# Patient Record
Sex: MALE | Race: WHITE | NOT HISPANIC OR LATINO | Employment: OTHER | URBAN - METROPOLITAN AREA
[De-identification: names, ages, dates, MRNs, and addresses within clinical notes are randomized per-mention and may not be internally consistent; named-entity substitution may affect disease eponyms.]

---

## 2018-11-07 ENCOUNTER — TRANSFERRED RECORDS (OUTPATIENT)
Dept: HEALTH INFORMATION MANAGEMENT | Facility: CLINIC | Age: 63
End: 2018-11-07

## 2018-11-21 ENCOUNTER — TRANSFERRED RECORDS (OUTPATIENT)
Dept: HEALTH INFORMATION MANAGEMENT | Facility: CLINIC | Age: 63
End: 2018-11-21

## 2019-08-27 LAB
CREAT SERPL-MCNC: 1.11 MG/DL (ref 0.73–1.18)
GFR SERPL CREATININE-BSD FRML MDRD: >60 ML/MIN/1.73M2
GLUCOSE SERPL-MCNC: 102 MG/DL (ref 70–100)
POTASSIUM SERPL-SCNC: 4.6 MMOL/L (ref 3.5–5.1)

## 2019-09-03 ENCOUNTER — TRANSFERRED RECORDS (OUTPATIENT)
Dept: HEALTH INFORMATION MANAGEMENT | Facility: CLINIC | Age: 64
End: 2019-09-03

## 2019-09-05 ENCOUNTER — MEDICAL CORRESPONDENCE (OUTPATIENT)
Dept: HEALTH INFORMATION MANAGEMENT | Facility: CLINIC | Age: 64
End: 2019-09-05

## 2019-09-05 ENCOUNTER — TRANSFERRED RECORDS (OUTPATIENT)
Dept: HEALTH INFORMATION MANAGEMENT | Facility: CLINIC | Age: 64
End: 2019-09-05

## 2019-09-18 ENCOUNTER — REFERRAL (OUTPATIENT)
Dept: TRANSPLANT | Facility: CLINIC | Age: 64
End: 2019-09-18

## 2019-09-18 DIAGNOSIS — Z86.39 HISTORY OF HEMOCHROMATOSIS: ICD-10-CM

## 2019-09-18 DIAGNOSIS — K70.31 ALCOHOLIC CIRRHOSIS OF LIVER WITH ASCITES (H): Primary | ICD-10-CM

## 2019-09-18 DIAGNOSIS — K76.6 PORTAL HYPERTENSION (H): ICD-10-CM

## 2019-09-18 DIAGNOSIS — E11.9 DIABETES MELLITUS TYPE 2, UNCOMPLICATED (H): ICD-10-CM

## 2019-09-18 NOTE — LETTER
10/7/2019    Damion BROWN Quinones   [address] [facility] [address]      Dear Damion,    Thank you for your interest in the Transplant Center at [facility], [facility]. We look forward to being a part of your care team and assisting you through the transplant process.    As we discussed, your transplant coordinator is Abdias Quinteros Jr. (Liver).  You may call your coordinator at any time with questions or concerns call [phone].    Please complete the following.    1. Fill out and return the enclosed forms    Authorization for Electronic Communication    Authorization to Discuss Protected Health Information    Authorization for Release of Protected Health Information    2. Sign up for:    TraceWorks, access to your electronic medical record (see enclosed pamphlet)    [email or web address], a transplant education website    You can use these tools to learn more about your transplant, communicate with your care team, and track your medical details      Sincerely,    Solid Organ Transplant  [facility], [facility]    cc: Vandana BELLO

## 2019-09-18 NOTE — LETTER
LIVER TRANSPLANT  EVALUATION SCHEDULE    Patient:   Damion Quinones  MR#:    [id number]  Coordinator:  Betito       [phone]  :     Magda                 [phone]  Location:    [facility]  Date(s):    October 22, 2019                                  This is your evaluation schedule, please follow dates and times.  You will   receive reminder phone calls for other tests, but please follow this schedule  only!  If you have any questions about dates and times, please call us on  number listed above.  Thank you, Transplant Services     *NO FOOD or DRINK AFTER 10:30 PM*  (You may only have small amounts of water)    Day/Date:    Tuesday, October 22, 2019  Time Location Activity   6:45 AM Imaging and Lab testing  (1st floor [facility] ) Blood tests    7:00 AM Imaging and Lab testing  (1st floor [facility] ) Liver Ultrasound with dopplers=NO FOOD or DRINK 8 HOURS BEFORE TEST!!!   8:30 AM Transplant Services  (3rd floor [facility]) Appointment with Nidhi Leal,  Registered Dietitian   9:30 AM Transplant Services  (3rd floor [facility]) Appointment with Dr. Barrios,  Transplant Surgeon   10:00 AM Transplant Services  (3rd floor [facility]) Appointment with Jamal Bettencourt     11:00 AM Medicine Specialties  (3rd floor [facility]) Appointment with Dr. Leventhal,  Hepatologist         Day/Date:    Every Thursday *OPTIONAL*  Time Location Activity   12:00 PM-1:30 PM Liver Transplant Support Group  Hospital Station 7B  Room 7-120 Every Thursday *OPTIONAL*     *IF ON LINE CHECK IN IS NOT DONE, YOU WILL NEED TO CHECK IN 15 MINUTES EARLIER THEN THE FIRST SCHEDULED APPOINTMENT*

## 2019-09-18 NOTE — LETTER
LIVER TRANSPLANT  EVALUATION SCHEDULE    Patient:   Damion Quinones  MR#:    [id number]  Coordinator:  Betito       [phone]  :     Magda                 [phone]  Location:    [facility]  Date(s):    November 5, 2019                                  This is your evaluation schedule, please follow dates and times.  You will   receive reminder phone calls for other tests, but please follow this schedule  only!  If you have any questions about dates and times, please call us on  number listed above.  Thank you, Transplant Services     *NO FOOD or DRINK AFTER 10:30 PM*  (You may only have small amounts of water)    Day/Date:    Tuesday, November 5, 2019  Time Location Activity   6:30 AM Imaging and Lab testing  (1st floor [facility] ) Blood tests    6:45 AM Imaging and Lab testing  (1st floor [facility] ) Liver Ultrasound with dopplers=NO FOOD or DRINK 8 HOURS BEFORE TEST!!!   8:00 AM Medicine Specialties  (3rd floor [facility] and [facility] Center) Appointment with Dr. Green,  Hepatologist   9:00 AM Transplant Services  (3rd floor [facility]) Appointment with Nidhi Leal  Registered Dietitian   9:30 AM Transplant Services  (3rd floor [facility]) Appointment with Dr. Clifton,  Transplant Surgeon   10:00 AM Transplant Services  (3rd floor [facility] and [facility] Center) Appointment with Jamal Bettencourt           Day/Date:    Every Thursday *OPTIONAL*  Time Location Activity   12:00 PM-1:30 PM Liver Transplant Support Group  Hospital Station 7B  Room 7-120 Every Thursday *OPTIONAL*     *IF ON LINE CHECK IN IS NOT DONE, YOU WILL NEED TO CHECK IN 15 MINUTES EARLIER THEN THE FIRST SCHEDULED APPOINTMENT*

## 2019-09-18 NOTE — LETTER
April 22, 2020    Damion Quinones   [address] [facility] [address]    Dear Mr. Quinones,   We have been trying to reach you to see if you would be interested in our help making sure you are able to secure the proper insurance coverage to be evaluated at the [facility] for a liver transplant. Please give me a call at the number below and we would be happy to help.   Important things you should know:    We recommend that you continue to follow up with your primary care doctor in order to manage your health concerns.    We recommend that you continue to follow with Dr. Beaulieu in order to manage your liver health.     At this time we will be closing your referral, but can easily re--open if you are interested in the future.   Thank you for allowing us to participate in your care.  We wish you well.  Sincerely,    Abdias Quinteros Jr., BSN, RN  Liver Transplant Coordinator  [phone]    Solid Organ Transplant  [facility], [facility]    Enclosures: UNOS Letter  cc: Care Team            The Organ Procurement and Transplantation Network  Toll-free patient services line:     Your resource for organ transplant information    If you have a question regarding your own medical care, you always should call your transplant hospital first. However, for general organ transplant-related information, you can call the Organ Procurement and Transplantation Network (OPTN) toll-free patient services line at [phone]- [phone]. Anyone, including potential transplant candidates, candidates, recipients, family members, friends, living donors, and donor family members, can call this number to:          Talk about organ donation, living donation, the transplant process, the donation process, and transplant policies.    Get a free patient information kit with helpful booklets, waiting list and transplant information, and a list of all transplant hospitals.    Ask questions about the OPTN  website (https://optn.transplant.hrsa.gov/), the United Network for Organ Sharing s (UNOS) website (https://unos.org/), or the UNOS website for living donors and transplant recipients. (https://www.transplantliving.org/).    Learn how the OPTN can help you.    Talk about any concerns that you may have with a transplant hospital.    The [facility] transplant system, the OPTN, is managed under federal contract by the United Network for Organ Sharing (UNOS), which is a non-profit charitable organization. The OPTN helps create and define organ sharing policies that make the best use of donated organs. This process continuously evaluating new advances and discoveries so policies can be adapted to best serve patients waiting for transplants. To do so, the OPTN works closely with transplant professionals, transplant patients, transplant candidates, donor families, living donors, and the public. All transplant programs and organ procurement organizations throughout the country are OPTN members and are obligated to follow the policies the OPTN creates for allocating organs.    The OPTN also is responsible for:      Providing educational material for patients, the public, and professionals.    Raising awareness of the need for donated organs and tissue.    Coordinating organ procurement, matching, and placement.    Collecting information about every organ transplant and donation that occurs in the [place].    Remember, you should contact your transplant hospital directly if you have questions or concerns about your own medical care including medical records, work-up progress, and test results.    We are not your transplant hospital, and our staff will not be able to answer questions about your case, so please keep your transplant hospital s phone number handy.    However, while you research your transplant needs and learn as much as you can about transplantation and donation, we welcome your call to our toll-free patient  services line at [phone].          Updated 4/1/2019

## 2019-09-18 NOTE — LETTER
LIVER CONSULT   SCHEDULE    Patient:   Damion Quinones  MR#:    [id number]  Coordinator:  Betito         [phone]  :     Magda             [phone]  Location:    [facility]  Date(s):    November 4, 2019    This is your consult schedule, please follow dates and times.  You will   receive reminder phone calls for other tests, but please follow this schedule  only!  If you have any questions about dates and times, please call us on  number listed above.  Thank you, Transplant Services     *NO FOOD or DRINK AFTER 11:30 PM*  (You may only have small amounts of water)    Day/Date:    Monday, November 4, 2019  Time Location Activity   7:30 AM Imaging and Lab testing  (1st floor [facility] ) Blood tests    8:00 AM Medicine Specialties  (3rd floor [facility]) Appointment with Dr. Poole,  Hepatologist   9:50 AM Transplant Services  (3rd floor [facility]) Appointment with Dr. Clifton,  Transplant Surgeon   *11:00 AM [employer]  1st Floor/Entrance of Clinic and [facility]  Take M DonorPro to Hospital  (Shuttle is White with Maroon Lettering)   1:00 PM Imaging and Lab testing  (1st floor [facility] ) Liver Ultrasound with dopplers=NO FOOD or DRINK 8 HOURS BEFORE TEST!!!       Day/Date:    Every Thursday *OPTIONAL*  Time Location Activity   12:00p.m. - 1:30p.m. Liver Transplant Support Group  Hospital Station 7B  Room 7-120 Every Thursday *OPTIONAL*     *IF ON LINE CHECK IN IS NOT DONE, YOU WILL NEED TO CHECK IN 15 MINUTES EARLIER THEN THE FIRST SCHEDULED APPOINTMENT*

## 2019-09-30 VITALS — WEIGHT: 229 LBS | HEIGHT: 68 IN | BODY MASS INDEX: 34.71 KG/M2

## 2019-09-30 ASSESSMENT — MIFFLIN-ST. JEOR: SCORE: 1813.24

## 2019-09-30 NOTE — TELEPHONE ENCOUNTER
Patient was asked the following questions during liver intake call.     Referring Provider: Dr. 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  Patient is aware that after financial approval is received, medical records will be requested.   Patient confirmed for a callback from transplant coordinator on 10/3/2019.  Tentative evaluation date TBD.    Confirmed coordinator will discuss evaluation process in more detail at the time of their call.   Patient is aware of the need to arrange age appropriate cancer screening, vaccinations, and dental care.  Reminded patient to complete questionnaire, complete medical records release, and review packet prior to evaluation visit .  Assessed patient for special needs (ie--wheelchair, assistance, guardian, and ):  No  Patient instructed to call [phone] with questions.     KOBE Mccrary, LPN   Solid Organ Transplant

## 2019-10-11 PROBLEM — Z86.39 HISTORY OF HEMOCHROMATOSIS: Status: ACTIVE | Noted: 2019-10-11

## 2019-10-11 PROBLEM — E11.9 DIABETES MELLITUS TYPE 2, UNCOMPLICATED (H): Status: ACTIVE | Noted: 2019-10-11

## 2019-10-11 NOTE — TELEPHONE ENCOUNTER
Patient is a 63 y/o referral from Dr. Junaid Beaulieu at  for TIPS versus Txp determination.    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 He also has a history of alcohol abuse which has led to cirrhosis. See below for list of decompensation. He quit smoking over 30 years ago.    MELD 18    Ascites - yes  Taps - approx every 10 days  HE - none & on no meds  GIB - no  EGD - appears last was 11/21/18 (CE) with Grade 1 EV  Colonoscopy - CE shows he had one but need to track down    Plan as discussed with the patient:  Tuesday evaluation to determine if TIPS, Transplant, or both should be pursued. Patient agreed with this plan and wants to come on 11/5 for those appts.    Full eval pending 11/5 appts

## 2019-10-16 NOTE — TELEPHONE ENCOUNTER
Spoke with patient who has had some recent developments at work.    He has been offered, and accepted, an early care home package that will go into affect 11/1/19    Therefore, his insurance will change on 11/1/19 to cobra.    Discussed with patient options and we agreed that coming for consults on 10/22 would be best so that IF team suggests TIPS it still might be possible to get it done before 10/31/19.    He agreed and will come on 10/22

## 2019-10-18 ENCOUNTER — TELEPHONE (OUTPATIENT)
Dept: GASTROENTEROLOGY | Facility: CLINIC | Age: 64
End: 2019-10-18

## 2019-10-21 ENCOUNTER — DOCUMENTATION ONLY (OUTPATIENT)
Dept: CARE COORDINATION | Facility: CLINIC | Age: 64
End: 2019-10-21

## 2019-10-21 NOTE — TELEPHONE ENCOUNTER
Pt now unable to come for appointments on 10/22/19. Pt agreed to come on 11/4/19 for Liver Consult appointments. All appointments scheduled and a schedule was sent to patient via [employer].

## 2019-10-31 NOTE — TELEPHONE ENCOUNTER
Patient is still having difficulty with insurance given his recent early MCFP.    He wants to cancel his 11/4 appts and will reschedule him for first week of December, give or take.

## 2020-03-26 NOTE — TELEPHONE ENCOUNTER
Patient contacted and reminded of upcoming appointment.  Patient confirmed they will be attending.  Patient instructed to bring updated medications list to appointment.    Alexandra Jessica on 10/18/2019 at 11:01 AM     Patient

## 2020-04-22 NOTE — TELEPHONE ENCOUNTER
LVM for patient asking him to call me or Tanika to help with insurance issues, that are still reported in his recent (3/12/20) visit with referring Dr. Junaid Beaulieu.    Plan: will close referral at this time, but will re-open once patient has secured proper coverage and is interested in coming here for an evaluation.     Letters will be sent to referring and patient explaining this. On VM left my direct number and Tanika's direct number.

## 2021-11-09 ENCOUNTER — TRANSFERRED RECORDS (OUTPATIENT)
Dept: HEALTH INFORMATION MANAGEMENT | Facility: CLINIC | Age: 66
End: 2021-11-09

## 2022-09-06 ENCOUNTER — TRANSCRIBE ORDERS (OUTPATIENT)
Dept: OTHER | Age: 67
End: 2022-09-06

## 2022-09-06 DIAGNOSIS — G62.9 NEUROPATHY: Primary | ICD-10-CM

## 2022-09-24 ENCOUNTER — TRANSFERRED RECORDS (OUTPATIENT)
Dept: HEALTH INFORMATION MANAGEMENT | Facility: CLINIC | Age: 67
End: 2022-09-24

## 2022-09-30 ENCOUNTER — TRANSFERRED RECORDS (OUTPATIENT)
Dept: HEALTH INFORMATION MANAGEMENT | Facility: CLINIC | Age: 67
End: 2022-09-30

## 2022-10-12 ENCOUNTER — MEDICAL CORRESPONDENCE (OUTPATIENT)
Dept: HEALTH INFORMATION MANAGEMENT | Facility: CLINIC | Age: 67
End: 2022-10-12

## 2022-10-20 ENCOUNTER — TRANSCRIBE ORDERS (OUTPATIENT)
Dept: OTHER | Age: 67
End: 2022-10-20

## 2022-10-20 DIAGNOSIS — Z95.828 S/P TIPS (TRANSJUGULAR INTRAHEPATIC PORTOSYSTEMIC SHUNT): Primary | ICD-10-CM

## 2022-10-20 DIAGNOSIS — K70.30 ALCOHOLIC CIRRHOSIS OF LIVER WITHOUT ASCITES (H): ICD-10-CM

## 2022-10-26 ENCOUNTER — REFERRAL (OUTPATIENT)
Dept: TRANSPLANT | Facility: CLINIC | Age: 67
End: 2022-10-26

## 2022-10-26 DIAGNOSIS — Z11.59 ENCOUNTER FOR SCREENING FOR OTHER VIRAL DISEASES: ICD-10-CM

## 2022-10-26 DIAGNOSIS — R79.9 ABNORMAL FINDING OF BLOOD CHEMISTRY, UNSPECIFIED: ICD-10-CM

## 2022-10-26 DIAGNOSIS — R18.8 OTHER ASCITES: ICD-10-CM

## 2022-10-26 DIAGNOSIS — M85.89 OTHER SPECIFIED DISORDERS OF BONE DENSITY AND STRUCTURE, MULTIPLE SITES: ICD-10-CM

## 2022-10-26 DIAGNOSIS — N18.9 CHRONIC KIDNEY DISEASE, UNSPECIFIED: ICD-10-CM

## 2022-10-26 DIAGNOSIS — I70.0 ATHEROSCLEROSIS OF AORTA (H): ICD-10-CM

## 2022-10-26 DIAGNOSIS — K70.30 ALCOHOLIC CIRRHOSIS (H): Primary | ICD-10-CM

## 2022-10-26 DIAGNOSIS — Z12.5 ENCOUNTER FOR SCREENING FOR MALIGNANT NEOPLASM OF PROSTATE: ICD-10-CM

## 2022-10-26 DIAGNOSIS — K70.30 CIRRHOSIS, ALCOHOLIC (H): ICD-10-CM

## 2022-10-26 DIAGNOSIS — N18.4 CHRONIC KIDNEY DISEASE, STAGE 4 (SEVERE) (H): ICD-10-CM

## 2022-10-26 NOTE — LETTER
10/27/2022    Dmaion KEVIN Quinones   [address] [facility] [address]          Dear Damion,    Thank you for your interest in the Transplant Center at [facility]. We look forward to being a part of your care team and assisting you through the transplant process.    As we discussed, your transplant coordinator is Meenu Saha (Liver).  You may call your coordinator at any time with questions or concerns at [phone].    Please complete the following.    1. Fill out and return the enclosed forms    Authorization for Electronic Communication    Authorization to Discuss Protected Health Information    Authorization for Release of Protected Health Information    Authorization for Care Everywhere Release of Information    2. Sign up for:    Stream[employer] System, access to your electronic medical record (see enclosed pamphlet)    Revel SystemsplantStamp.it, a transplant education website    You can use these tools to learn more about your transplant, communicate with your care team, and track your medical details      Sincerely,      Solid Organ Transplant  [facility]    cc: Referring Physician and PCP

## 2022-10-27 VITALS — WEIGHT: 228 LBS | BODY MASS INDEX: 35.79 KG/M2 | HEIGHT: 67 IN

## 2022-10-27 NOTE — TELEPHONE ENCOUNTER
Patient was asked the following questions during liver intake call.     Referring Provider: Dr. Junaid Beaulieu   Referring Diagnosis: Alcoholic Cirrhosis of the Liver   PCP: Polo Diaz,     1)Do you know why you have liver disease: Yes       If Alcoholic Cirrhosis is present when was your last drink: 12/31/2021       Have you ever been through treatment for alcohol: No  2) Presence of Ascites: No Paracentesis: No  3) Presence of Hepatic Encephalopathy: No Medications: Yes  4) History of GI Bleeding: Yes  5) Oxygen Use: None  6) EGD: Yes @ [facility]   7) Colonoscopy: Yes @ [facility]    8) MELD Score:    9) Labs available for review from PCP/GI: Yes  10)HCC Diagnosis: No                11)Insurance information:  [employer]/Medicare             Policy Keller: Spouse/Self              Subscriber/Policy/ID Number: [id number]/patient didn't have medicare card.              Group Number:     Referral intake process completed.  Patient is aware that after financial approval is received, medical records will be requested.   Patient confirmed for a callback from transplant coordinator on 11/3/22.  Tentative evaluation date TBD.    Confirmed coordinator will discuss evaluation process in more detail at the time of their call.   Patient is aware of the need to arrange age appropriate cancer screening, vaccinations, and dental care.  Reminded patient to complete questionnaire, complete medical records release, and review packet prior to evaluation visit .  Assessed patient for special needs (ie--wheelchair, assistance, guardian, and ):  None  Patient instructed to call [phone] with questions.     Patient gave verbal consent during intake call to obtain medical records and documents outside of [facility]:  Yes

## 2022-11-03 NOTE — TELEPHONE ENCOUNTER
LIVER DISEASE ETOH   REFERRING PROVIDER Joycelyn ORDAZ [facility]  [facility]   -----------------------------------------------------------------------------------------------------------------------------  MELD  22, mainly driven by creatinine  ABO  O     First dx in 2015, presented with hematemesis.  Hospitalized at [facility].  Was pretty healthy except hemochromatosis, had phlebotomy treatment.  Essentially asymptomatic from liver disease until this past summer    Hx of Afib    On Humira for last decade for RA, psoriasis    July/Aug went in for routine labs and found kidney issues- placed on prednisone to preserve kidney function.Kindey function was chronic til    Just this past month, woke up vomiting blood this past week.  TIPS placed emergently. Told  Liver not functioning.      Was following with Dr. Beaulieu every 6 mos until just this past September.  No flags from doctor in     Kidney bx Sept 2022 :  A-C. Native Kidney biopsy:  - Focal global and focal segmental glomerular sclerosis (13% and 7%) with glomerular IgA deposition, see comment.  Interstitial hemorrhage and tubulointerstitial inflammation.  - Severe interstitial fibrosis and tubular atrophy (~80%).  - Severe arteriosclerosis.      Ascites  On Bumex- keep upping doses.    Zeeshan - no, had TIPS due to bleeding  TIPS- Yes Sept 2021 due to bleeding varices, hematemesis.    HE-  Not until TIPS, on lactulose, Zinc,   Kidney function no  Variceal screening Last EGD. Location. Varices no/yes. Rec follow up.  HCC Screening Last imaging and location  Alcohol use ETOH was drinking 12 beers a day- high stress job, working from home and drank all day  Had a glass of champagne in 2020 New Years, last ETOH, prior last beer in 2016.  Never any legal issues     Never CD eval or treatment    ---------------------------------------------------------------------------------------------------------------------------------  PMH  Cardiac- Afib on Dilt  Pulm-  denies, never smoker  Diabetes - no  Abdominal surgery -TIPS; hernia surgery x 3 (umblical x 2 ), appy at age 17  Knee replacements x 2  Colonoscopy every 3 years- next due April 2023 done at [facility]    HX: Rheum arthritis, psoriatic arthritis, psoriasis    Activity- was walking a lot, harder with BLE edema.      TAI   to spouse Apoorva for 30 years  Lives in house 2 grown children  [place] live in [place]  Hasn't worked since 2019- was laid off back then, helped with wife's mom through COVID,  hasn't worked since.      ----------------------------------------------------------------------------------------------------------------  LDLT Discussed no    PLAN    Eval 11/22/22, needs transplant nephrology also    Would likely need [facility]- sever arteriosclerosis on imagine,   Transplant nephrology- [facility]?    Medications:  Bumex 4 mg bid  Calcium 500 mg  Diltiazem- AFib 120 mg (AFib and BP)  Folic Acid 25 mg  Lactulose 2-3 x a day, titrating  MVI  Pantoprazole   Prednisone 20 mg a day  Rifaximin 550 mg bid  Zinc   Potassium 20 mg bid

## 2022-11-18 NOTE — PROGRESS NOTES
[facility] Hepatology    New Patient Visit    Referring provider:  Junaid Beaulieu  Chief complaint:  LT Eval    Assessment  67 year old male with PMHx of decompensated alcohol + hemochromatosis (homozygous H63D) cirrhosis complicated by variceal bleeding s/p TIPS (10/1/2022) and hepatic encephalopathy. PMHx also includes alcohol overuse, CKD (IgA nephropathy + ANCA vasculitis), psoriatic arthritis, atrial fibrillation and HTN.     In terms of the patient's liver disease he has decompensated alcohol and hemochromatosis related liver disease complicated by history of variceal bleeding at which point he underwent TIPS on 10/1/2022. No recurrent bleeding post TIPS. His post TIPS course has been complicated by hepatic encephalopathy; currently on maximal medical therapy.    At this point in time his MELD score is mostly driven by his renal dysfunction.  He is not currently on dialysis and his eGFR is 14. MELD-Na: 21. Given the patient's age and multiple co morbidities, including but not limited to atrial fibrillation (not on anticoagulation), class II obesity, frailty with limited mobility, and worsening renal function that will require dialysis, he is not an ideal candidate for simultaneous liver kidney transplantation.     ABO: O positive    Plan  -- Would benefit from seeing transplant nephrology for consideration of simultaneous liver kidney transplant  -- Cardiology workup: Plan for TTE + would benefit from seeing cardiology for atrial fibrillation management (not on anticoagulation and unclear why) + coronary assessment, such as pharmacologic stress testing  -- Appreciate social work assessment of prior alcohol use, last heavy use 2016, but slip in 12/2020  -- Given snoring and STOPBANG > 4, referral to sleep clinic for evaluation of sleep apnea  -- Continue diuretics per nephrology   -- Continue lactulose + rifaximin along with zinc replacement. Avoid sedating medications    Other:  -- CRC Surveillance: Repeat  due in 2023 given 5 polyps removed in 2020; path: tubular adenomas  -- Hep B s Ab < 2.0; would benefit from vaccination given HBV non-immune    RTC: 3 months    Discussed with attending hepatologist, Dr. Virgilio Bermudez MD  Transplant Hepatology Fellow  [phone]    HPI:  Alcohol Cirrhosis  - hx HE  - no hx ascites  - hx variceal bleed s/p TIPS (10/1/2022)  - last EGD 9/2022: Small EV, IGV1 oozing, portal HTN gastropathy, IGV2 - s/p TIPS 10/1/2022  - HCC screening US 11/2022 - pending     Presented with wife Nabila.     Varices  - History of gastric variceal bleeding s/p TIPS 10/1/2022  - Since TIPS placement he has not had any issues with melena, hematochezia or hematemesis.    Hepatic Encephalopathy  - Developed HE following TIPS placement, on maximal medical therapy of lactulose + rifaximin   - Admission 9/30-10/3/2022: Presented with altered mental status following TIPS. Improved with initiation of lactulose, rifaximin and zinc initiation  - Despite initiation of maximal medical therapy he has had intermittent issues with encephalopathy.  He reports adherence to his medications + 3 soft BM per day without blood.  He denies any sedating medications.  He reports issues with sleep and often takes during the day.  He has gained weight in the setting of prednisone use and has been snoring more.    Alcohol use:  - Last drink: 12/31/2020, last heavy use 2016  - Pattern of use: 12 beers a day. Started use in teen years, increased gradually in the last 1.5-2 years.   - Chemical dependency: None since last use, previously chemical dependency programming in 1980s    CKD - IgA nephropathy + ANCA vasculitis   - eGFR 14 today with ongoing issues with volume overload. Currently on bumex 4mg BID.   - Started on prednisone in September 2022, max dose: 80mg per day, now weaning - currently at 15mg per day  - Rituximab infusions, started 10/2022; has undergone 3 infusions and plan for next infusion in 5-6 months  -  Minimal urine protein noted (6.8mg/dL)  - Myeloperoxidase Ab 9.4 (H); proteinase 3 Ab units < 0.7 (wnl); C3 97 (wnl) + C4 19.7 (wnl)    Psoriatic Arthritis  - Issues with arthritis with joint deformities in his hands bilaterally. Was on Humira from 2018 to 8/2022, now off.     HTN:  - Diltiazem 120mg daily     Atrial Fibrillation  - Denies ever being on anticoagulation  - On rate control with diltiazem as above    Hemochromatosis - H63D homozygous  - Previously underwent phlebotomy x 2 prior to 2020, none since    - Denies jaundice. Abdominal distension and lower extremity edema, but no history of ascites.   - Denies melena, hematemesis or hematochezia.  - Denies fevers, sweats or chills  - Low appetite, minimal protein intake  - s/p 3 doses of the COVID-19 vaccine    Medical hx Surgical hx   Past Medical History:   Diagnosis Date     Alcoholic cirrhosis of liver with ascites (H) 10/11/2019     Arthritis     RA     Coronary artery disease     AFib 2016     Diabetes mellitus type 2, uncomplicated (H) 10/11/2019     History of blood transfusion     2016     History of hemochromatosis 10/11/2019     Hypertension       Past Surgical History:   Procedure Laterality Date     APPENDECTOMY      Removed at 18 Years Old      COLONOSCOPY      [facility].      EYE SURGERY      Left Cataract Removal      GI SURGERY      TIPS     HERNIA REPAIR      History of bilateral inguinal hernia repair: 10/28/2014. Open hernia repair: 10/2017. Abdominal wound exploration and debridement 12/27/2017   + knee replacements x 2       Medications  Current Outpatient Medications   Medication Sig Dispense Refill     bumetanide (BUMEX) 1 MG tablet        Calcium Carbonate-Vitamin D 500-3.125 MG-MCG TABS Take 1 tablet by mouth 2 times daily       diltiazem ER (DILT-XR) 120 MG 24 hr capsule Take 120 mg by mouth       folic acid (FOLVITE) 1 MG tablet TAKE FIVE TABLETS BY MOUTH EVERY DAY       lactulose (CHRONULAC) 10 GM/15ML solution TAKE 45  "ML BY MOUTH THREE TIMES DAILY       pantoprazole (PROTONIX) 40 MG EC tablet Take 40 mg by mouth daily       potassium chloride ER (KLOR-CON M) 20 MEQ CR tablet Take 20 mEq by mouth 2 times daily       predniSONE (DELTASONE) 5 MG tablet Take 15 mg by mouth       XIFAXAN 550 MG TABS tablet        Zinc Sulfate 220 (50 Zn) MG TABS Take 220 mg by mouth         Allergies  No Known Allergies    Family hx Social hx   Family History   Problem Relation Age of Onset     Lung Cancer Mother      Colon Cancer Father    - Brother: lung cancer in 50s  - Brother: heart disease (unclear what), renal disease (unclear what)  - Brother: colon issues (unclear what)   - Alcohol: as above  - Tobacco: Denies  - Drugs: Denies  -  (Apoorva), has two adult children   - Retired, previously in IT. Hasn't worked sine 2019.  - Stays active: gardening, hunting + fishing, working on old cars     Review of systems  A 10-point review of systems was negative.    Examination  BP (!) 145/87   Pulse 92   Ht 1.702 m (5' 7\")   Wt 103 kg (227 lb 1.2 oz)   SpO2 99%   BMI 35.56 kg/m    Body mass index is 35.56 kg/m .    Gen-NAD  Eye- EOMI  ENT- MMM, normal oropharynx  CVS- Irregular rate and rhythm.   RS- CTA bilaterally  Abd- Distended. Non-tender throughout. Umbilical hernia  Extr- 2+ radial pulses bilaterally, 1+ lower extremity edema bilaterally  MS- hands without clubbing  Neuro- A+Ox3, mild asterixis  Skin- no rash. No jaundice. Duptyrens contractures bilaterally.  Psych- normal mood    Laboratory  BMP  Recent Labs   Lab Test 11/22/22  0848 08/27/19  0000     --    POTASSIUM 3.6 4.6   CHLORIDE 97*  --    NAZARIO 9.4  --    CO2 31*  --    BUN 69.4*  --    CR 4.52* 1.11   * 102*     CBC  Recent Labs   Lab Test 11/22/22  0848   WBC 14.8*   RBC 4.24*   HGB 13.0*   HCT 39.6*   MCV 93   MCH 30.7   MCHC 32.8   RDW 14.5        Liver Enzymes   Recent Labs   Lab Test 11/22/22  0848   PROTTOTAL 5.8*   ALBUMIN 3.1*   BILITOTAL 1.3*   ALKPHOS " "372*   AST 91*   ALT 91*      INR   INR   Date Value Ref Range Status   11/22/2022 1.03 0.85 - 1.15 Final      Iron sat 41%; ferritin 896  MIGUEL A positive  Hep A IgG negative  Hep B s Ag negative  Hep B s Ab < 2.0  Hep B c Ab negative  Hep C ab negative    Hemochromatosis: negative C282Y, homozygous H63D, negative S65C    Radiology  Abdominal US 10/18/22  1. Patent TIPS without definite evidence for significant stenosis.   2. Left Perinephric fluid collection. This likely represents a perinephric hematoma as the patient had a biopsy of the left kidney on 09/09/2022. This is slightly smaller than noted on the previous exam.      TTE 9/25/22  1. Technically difficult exam.   2. The left ventricular size is normal.  Systolic function is normal.The estimated ejection fraction is 60-65%.  Wall thickness is mildly to moderately increased.  Left ventricular segmental wall motion is grossly normal, however endocardial definition is limited.   3. Visualized limited portions of the right ventricle are normal.  4. The left atrium is severely enlarged.   5. There is likely borderline aortic valve stenosis with a peak velocity of 149.00 cm/s, mean gradient of 5 mmHg, and aortic valve area of 1.87 cm2. Dimensionless indez of 0.54  6. The proximal ascending aorta measures 3.80 cm with an index of 1.66 cm/m2.   7. There is no gross pericardial effusion.     Renal Biopsy 9/9/2022  A-C. Native Kidney biopsy:  - Focal global and focal segmental glomerular sclerosis (13% and 7%) with glomerular IgA deposition, see comment.  Interstitial hemorrhage and tubulointerstitial inflammation.  - Severe interstitial fibrosis and tubular atrophy (~80%).  - Severe arteriosclerosis.     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 In regards to the MPO positivity, necrotizing/crescentic glomular injury is not observed, and overt vasculitis is not " identified. However, the presence of interstitial hemorrhage and tubulointerstitial inflammation raise the possibility of an unsampled vasculitis.    DEXA 2018: No evidence of osteopenia/osteoporosis.     Endoscopy  EGD 9/2022: Small EV, IGV1 oozing, portal HTN gastropathy, IGV2, no gastric ulcers, normal duodenum    Colonoscopy 7/31/2020:  - Four 2 to 3 mm polyps in the descending colon, in  the transverse colon and in the ascending colon, removed with a jumbo cold forceps. Resected and retrieved.  - One 8 mm polyp in the descending colon, removed with a hot snare. Resected and retrieved. Clip (MR conditional) was placed.   Pathology: Tubular adenomas

## 2022-11-21 ENCOUNTER — TELEPHONE (OUTPATIENT)
Dept: TRANSPLANT | Facility: CLINIC | Age: 67
End: 2022-11-21

## 2022-11-21 LAB
ABO/RH(D): NORMAL
ANTIBODY SCREEN: NEGATIVE
SPECIMEN EXPIRATION DATE: NORMAL

## 2022-11-21 NOTE — TELEPHONE ENCOUNTER
Patient called, wanted to review what medications he should take in a.m.    NPO except meds, can take meds.  Not on insulin, not diabetic.  He is concerned about nausea taking meds on empty stomach.  Told him to hold MVI and Ca++ for just tomorrow and get other important meds in first, can take those later if stomach OK.  Discussed holding vitamins/supplements before dexa scheduled in Dec

## 2022-11-22 ENCOUNTER — OFFICE VISIT (OUTPATIENT)
Dept: TRANSPLANT | Facility: CLINIC | Age: 67
End: 2022-11-22
Attending: INTERNAL MEDICINE
Payer: COMMERCIAL

## 2022-11-22 ENCOUNTER — HOSPITAL ENCOUNTER (OUTPATIENT)
Dept: BEHAVIORAL HEALTH | Facility: CLINIC | Age: 67
Discharge: HOME | End: 2022-11-22
Attending: INTERNAL MEDICINE | Admitting: INTERNAL MEDICINE
Payer: COMMERCIAL

## 2022-11-22 ENCOUNTER — ANCILLARY PROCEDURE (OUTPATIENT)
Dept: GENERAL RADIOLOGY | Facility: CLINIC | Age: 67
End: 2022-11-22
Attending: INTERNAL MEDICINE
Payer: COMMERCIAL

## 2022-11-22 ENCOUNTER — LAB (OUTPATIENT)
Dept: LAB | Facility: CLINIC | Age: 67
End: 2022-11-22
Attending: INTERNAL MEDICINE
Payer: COMMERCIAL

## 2022-11-22 ENCOUNTER — ANCILLARY PROCEDURE (OUTPATIENT)
Dept: ULTRASOUND IMAGING | Facility: CLINIC | Age: 67
End: 2022-11-22
Attending: INTERNAL MEDICINE
Payer: COMMERCIAL

## 2022-11-22 ENCOUNTER — ALLIED HEALTH/NURSE VISIT (OUTPATIENT)
Dept: TRANSPLANT | Facility: CLINIC | Age: 67
End: 2022-11-22
Attending: INTERNAL MEDICINE
Payer: MEDICARE

## 2022-11-22 ENCOUNTER — OFFICE VISIT (OUTPATIENT)
Dept: GASTROENTEROLOGY | Facility: CLINIC | Age: 67
End: 2022-11-22
Attending: INTERNAL MEDICINE
Payer: COMMERCIAL

## 2022-11-22 ENCOUNTER — COMMITTEE REVIEW (OUTPATIENT)
Dept: TRANSPLANT | Facility: CLINIC | Age: 67
End: 2022-11-22

## 2022-11-22 VITALS
SYSTOLIC BLOOD PRESSURE: 145 MMHG | BODY MASS INDEX: 35.64 KG/M2 | OXYGEN SATURATION: 99 % | DIASTOLIC BLOOD PRESSURE: 87 MMHG | WEIGHT: 227.07 LBS | HEIGHT: 67 IN | HEART RATE: 92 BPM

## 2022-11-22 VITALS
HEIGHT: 67 IN | DIASTOLIC BLOOD PRESSURE: 87 MMHG | HEART RATE: 92 BPM | BODY MASS INDEX: 35.64 KG/M2 | WEIGHT: 227.07 LBS | SYSTOLIC BLOOD PRESSURE: 145 MMHG | OXYGEN SATURATION: 99 %

## 2022-11-22 DIAGNOSIS — K70.30 ALCOHOLIC CIRRHOSIS OF LIVER WITHOUT ASCITES (H): ICD-10-CM

## 2022-11-22 DIAGNOSIS — K70.31 ALCOHOLIC CIRRHOSIS OF LIVER WITH ASCITES (H): ICD-10-CM

## 2022-11-22 DIAGNOSIS — K70.30 CIRRHOSIS, ALCOHOLIC (H): ICD-10-CM

## 2022-11-22 DIAGNOSIS — N18.9 CHRONIC KIDNEY DISEASE: ICD-10-CM

## 2022-11-22 DIAGNOSIS — E66.09 CLASS 2 OBESITY DUE TO EXCESS CALORIES WITH BODY MASS INDEX (BMI) OF 35.0 TO 35.9 IN ADULT, UNSPECIFIED WHETHER SERIOUS COMORBIDITY PRESENT: ICD-10-CM

## 2022-11-22 DIAGNOSIS — K70.30 ALCOHOLIC CIRRHOSIS (H): ICD-10-CM

## 2022-11-22 DIAGNOSIS — I77.82 ANCA-POSITIVE VASCULITIS (H): ICD-10-CM

## 2022-11-22 DIAGNOSIS — E66.812 CLASS 2 OBESITY DUE TO EXCESS CALORIES WITH BODY MASS INDEX (BMI) OF 35.0 TO 35.9 IN ADULT, UNSPECIFIED WHETHER SERIOUS COMORBIDITY PRESENT: ICD-10-CM

## 2022-11-22 DIAGNOSIS — E66.01 MORBID OBESITY (H): Primary | ICD-10-CM

## 2022-11-22 DIAGNOSIS — K70.30 ALCOHOLIC CIRRHOSIS (H): Primary | ICD-10-CM

## 2022-11-22 DIAGNOSIS — I85.11 ESOPHAGEAL VARICES WITH BLEEDING IN DISEASES CLASSIFIED ELSEWHERE (H): ICD-10-CM

## 2022-11-22 DIAGNOSIS — R06.83 SNORING: Primary | ICD-10-CM

## 2022-11-22 DIAGNOSIS — I48.20 CHRONIC ATRIAL FIBRILLATION (H): ICD-10-CM

## 2022-11-22 DIAGNOSIS — K76.82 HEPATIC ENCEPHALOPATHY (H): ICD-10-CM

## 2022-11-22 DIAGNOSIS — Z86.39 HISTORY OF HEMOCHROMATOSIS: ICD-10-CM

## 2022-11-22 LAB
ABO/RH(D): NORMAL
AFP SERPL-MCNC: 2.6 NG/ML
ALBUMIN MFR UR ELPH: 6.8 MG/DL
ALBUMIN SERPL BCG-MCNC: 3.1 G/DL (ref 3.5–5.2)
ALBUMIN UR-MCNC: NEGATIVE MG/DL
ALP SERPL-CCNC: 372 U/L (ref 40–129)
ALT SERPL W P-5'-P-CCNC: 91 U/L (ref 10–50)
ANION GAP SERPL CALCULATED.3IONS-SCNC: 14 MMOL/L (ref 7–15)
APPEARANCE UR: CLEAR
AST SERPL W P-5'-P-CCNC: 91 U/L (ref 10–50)
ATRIAL RATE - MUSE: NORMAL BPM
BILIRUB DIRECT SERPL-MCNC: 0.67 MG/DL (ref 0–0.3)
BILIRUB SERPL-MCNC: 1.3 MG/DL
BILIRUB UR QL STRIP: NEGATIVE
BUN SERPL-MCNC: 69.4 MG/DL (ref 8–23)
CALCIUM SERPL-MCNC: 9.4 MG/DL (ref 8.8–10.2)
CHLORIDE SERPL-SCNC: 97 MMOL/L (ref 98–107)
CHOLEST SERPL-MCNC: 285 MG/DL
COLOR UR AUTO: NORMAL
CREAT SERPL-MCNC: 4.52 MG/DL (ref 0.67–1.17)
CREAT UR-MCNC: 50.3 MG/DL
DEPRECATED CALCIDIOL+CALCIFEROL SERPL-MC: 41 UG/L (ref 20–75)
DEPRECATED HCO3 PLAS-SCNC: 31 MMOL/L (ref 22–29)
DIASTOLIC BLOOD PRESSURE - MUSE: NORMAL MMHG
ERYTHROCYTE [DISTWIDTH] IN BLOOD BY AUTOMATED COUNT: 14.5 % (ref 10–15)
FERRITIN SERPL-MCNC: 429 NG/ML (ref 31–409)
GFR SERPL CREATININE-BSD FRML MDRD: 14 ML/MIN/1.73M2
GLUCOSE SERPL-MCNC: 103 MG/DL (ref 70–99)
GLUCOSE UR STRIP-MCNC: NEGATIVE MG/DL
HAV IGG SER QL IA: NONREACTIVE
HBA1C MFR BLD: 5.3 %
HBV CORE AB SERPL QL IA: NONREACTIVE
HBV SURFACE AB SERPL IA-ACNC: 0.25 M[IU]/ML
HBV SURFACE AB SERPL IA-ACNC: NONREACTIVE M[IU]/ML
HBV SURFACE AG SERPL QL IA: NONREACTIVE
HCT VFR BLD AUTO: 39.6 % (ref 40–53)
HCV AB SERPL QL IA: NONREACTIVE
HDLC SERPL-MCNC: 79 MG/DL
HGB BLD-MCNC: 13 G/DL (ref 13.3–17.7)
HGB UR QL STRIP: NEGATIVE
HIV 1+2 AB+HIV1 P24 AG SERPL QL IA: NONREACTIVE
INR PPP: 1.03 (ref 0.85–1.15)
INTERPRETATION ECG - MUSE: NORMAL
IRON BINDING CAPACITY (ROCHE): 219 UG/DL (ref 240–430)
IRON SATN MFR SERPL: 31 % (ref 15–46)
IRON SERPL-MCNC: 68 UG/DL (ref 61–157)
KETONES UR STRIP-MCNC: NEGATIVE MG/DL
LDLC SERPL CALC-MCNC: 187 MG/DL
LEUKOCYTE ESTERASE UR QL STRIP: NEGATIVE
MCH RBC QN AUTO: 30.7 PG (ref 26.5–33)
MCHC RBC AUTO-ENTMCNC: 32.8 G/DL (ref 31.5–36.5)
MCV RBC AUTO: 93 FL (ref 78–100)
NITRATE UR QL: NEGATIVE
NONHDLC SERPL-MCNC: 206 MG/DL
P AXIS - MUSE: NORMAL DEGREES
PH UR STRIP: 6 [PH] (ref 5–7)
PHOSPHATE SERPL-MCNC: 4.4 MG/DL (ref 2.5–4.5)
PLATELET # BLD AUTO: 154 10E3/UL (ref 150–450)
POTASSIUM SERPL-SCNC: 3.6 MMOL/L (ref 3.4–5.3)
PR INTERVAL - MUSE: NORMAL MS
PROT SERPL-MCNC: 5.8 G/DL (ref 6.4–8.3)
PROT/CREAT 24H UR: 0.14 MG/MG CR (ref 0–0.2)
PSA SERPL-MCNC: 0.69 NG/ML (ref 0–4.5)
QRS DURATION - MUSE: 74 MS
QT - MUSE: 364 MS
QTC - MUSE: 447 MS
R AXIS - MUSE: 57 DEGREES
RBC # BLD AUTO: 4.24 10E6/UL (ref 4.4–5.9)
SODIUM SERPL-SCNC: 142 MMOL/L (ref 136–145)
SP GR UR STRIP: 1.01 (ref 1–1.03)
SPECIMEN EXPIRATION DATE: NORMAL
SYSTOLIC BLOOD PRESSURE - MUSE: NORMAL MMHG
T AXIS - MUSE: -5 DEGREES
T PALLIDUM AB SER QL: NONREACTIVE
T4 FREE SERPL-MCNC: 1.09 NG/DL (ref 0.9–1.7)
TRANSFERRIN SERPL-MCNC: 185 MG/DL (ref 200–360)
TRIGL SERPL-MCNC: 96 MG/DL
TSH SERPL DL<=0.005 MIU/L-ACNC: 4.91 UIU/ML (ref 0.3–4.2)
UROBILINOGEN UR STRIP-MCNC: NORMAL MG/DL
VENTRICULAR RATE- MUSE: 91 BPM
WBC # BLD AUTO: 14.8 10E3/UL (ref 4–11)

## 2022-11-22 PROCEDURE — 86706 HEP B SURFACE ANTIBODY: CPT | Mod: GA | Performed by: INTERNAL MEDICINE

## 2022-11-22 PROCEDURE — 83540 ASSAY OF IRON: CPT | Performed by: PATHOLOGY

## 2022-11-22 PROCEDURE — 86665 EPSTEIN-BARR CAPSID VCA: CPT | Performed by: INTERNAL MEDICINE

## 2022-11-22 PROCEDURE — 85027 COMPLETE CBC AUTOMATED: CPT | Performed by: PATHOLOGY

## 2022-11-22 PROCEDURE — 82306 VITAMIN D 25 HYDROXY: CPT | Performed by: INTERNAL MEDICINE

## 2022-11-22 PROCEDURE — 80053 COMPREHEN METABOLIC PANEL: CPT | Performed by: PATHOLOGY

## 2022-11-22 PROCEDURE — 86704 HEP B CORE ANTIBODY TOTAL: CPT | Mod: GA | Performed by: INTERNAL MEDICINE

## 2022-11-22 PROCEDURE — 80321 ALCOHOLS BIOMARKERS 1OR 2: CPT | Mod: 90 | Performed by: PATHOLOGY

## 2022-11-22 PROCEDURE — 36415 COLL VENOUS BLD VENIPUNCTURE: CPT | Performed by: PATHOLOGY

## 2022-11-22 PROCEDURE — 80061 LIPID PANEL: CPT | Mod: GA | Performed by: PATHOLOGY

## 2022-11-22 PROCEDURE — 86780 TREPONEMA PALLIDUM: CPT | Performed by: INTERNAL MEDICINE

## 2022-11-22 PROCEDURE — 84439 ASSAY OF FREE THYROXINE: CPT | Performed by: PATHOLOGY

## 2022-11-22 PROCEDURE — 83036 HEMOGLOBIN GLYCOSYLATED A1C: CPT | Mod: GA | Performed by: INTERNAL MEDICINE

## 2022-11-22 PROCEDURE — 82248 BILIRUBIN DIRECT: CPT | Performed by: PATHOLOGY

## 2022-11-22 PROCEDURE — 99203 OFFICE O/P NEW LOW 30 MIN: CPT | Performed by: TRANSPLANT SURGERY

## 2022-11-22 PROCEDURE — 71046 X-RAY EXAM CHEST 2 VIEWS: CPT | Mod: GC | Performed by: RADIOLOGY

## 2022-11-22 PROCEDURE — 86803 HEPATITIS C AB TEST: CPT | Performed by: INTERNAL MEDICINE

## 2022-11-22 PROCEDURE — 83550 IRON BINDING TEST: CPT | Performed by: PATHOLOGY

## 2022-11-22 PROCEDURE — 84443 ASSAY THYROID STIM HORMONE: CPT | Mod: GA | Performed by: PATHOLOGY

## 2022-11-22 PROCEDURE — 86901 BLOOD TYPING SEROLOGIC RH(D): CPT | Performed by: INTERNAL MEDICINE

## 2022-11-22 PROCEDURE — 80307 DRUG TEST PRSMV CHEM ANLYZR: CPT | Mod: 90 | Performed by: PATHOLOGY

## 2022-11-22 PROCEDURE — 82728 ASSAY OF FERRITIN: CPT | Performed by: INTERNAL MEDICINE

## 2022-11-22 PROCEDURE — 86644 CMV ANTIBODY: CPT | Performed by: INTERNAL MEDICINE

## 2022-11-22 PROCEDURE — 85610 PROTHROMBIN TIME: CPT | Performed by: PATHOLOGY

## 2022-11-22 PROCEDURE — 84466 ASSAY OF TRANSFERRIN: CPT | Performed by: INTERNAL MEDICINE

## 2022-11-22 PROCEDURE — 86708 HEPATITIS A ANTIBODY: CPT | Performed by: INTERNAL MEDICINE

## 2022-11-22 PROCEDURE — 93975 VASCULAR STUDY: CPT | Mod: GC | Performed by: RADIOLOGY

## 2022-11-22 PROCEDURE — 99207 PR NO CHARGE COORDINATED CARE PS: CPT

## 2022-11-22 PROCEDURE — 84100 ASSAY OF PHOSPHORUS: CPT | Performed by: PATHOLOGY

## 2022-11-22 PROCEDURE — 86850 RBC ANTIBODY SCREEN: CPT | Performed by: INTERNAL MEDICINE

## 2022-11-22 PROCEDURE — 99000 SPECIMEN HANDLING OFFICE-LAB: CPT | Performed by: PATHOLOGY

## 2022-11-22 PROCEDURE — 87340 HEPATITIS B SURFACE AG IA: CPT | Performed by: INTERNAL MEDICINE

## 2022-11-22 PROCEDURE — 82105 ALPHA-FETOPROTEIN SERUM: CPT | Performed by: INTERNAL MEDICINE

## 2022-11-22 PROCEDURE — 86481 TB AG RESPONSE T-CELL SUSP: CPT | Performed by: INTERNAL MEDICINE

## 2022-11-22 PROCEDURE — 99205 OFFICE O/P NEW HI 60 MIN: CPT | Mod: GC | Performed by: INTERNAL MEDICINE

## 2022-11-22 PROCEDURE — G0103 PSA SCREENING: HCPCS | Mod: GA | Performed by: PATHOLOGY

## 2022-11-22 RX ORDER — PYRIDOXINE HCL (VITAMIN B6) 100 MG
1 TABLET ORAL 2 TIMES DAILY
Status: ON HOLD | COMMUNITY
Start: 2022-09-21 | End: 2023-06-25

## 2022-11-22 RX ORDER — BUMETANIDE 1 MG/1
2 TABLET ORAL 2 TIMES DAILY
COMMUNITY
Start: 2022-10-22 | End: 2023-03-28

## 2022-11-22 RX ORDER — PANTOPRAZOLE SODIUM 40 MG/1
20 TABLET, DELAYED RELEASE ORAL DAILY
COMMUNITY
Start: 2022-09-16 | End: 2023-01-23

## 2022-11-22 RX ORDER — PREDNISONE 10 MG/1
10 TABLET ORAL DAILY
Status: ON HOLD | COMMUNITY
Start: 2022-11-08 | End: 2023-06-25

## 2022-11-22 RX ORDER — RIFAXIMIN 550 MG/1
550 TABLET ORAL 2 TIMES DAILY
Status: ON HOLD | COMMUNITY
Start: 2022-10-31 | End: 2023-06-25

## 2022-11-22 RX ORDER — LACTULOSE 10 G/15ML
20 SOLUTION ORAL 3 TIMES DAILY
COMMUNITY
Start: 2022-11-19 | End: 2023-03-09

## 2022-11-22 RX ORDER — DILTIAZEM HYDROCHLORIDE 120 MG/1
120 CAPSULE, EXTENDED RELEASE ORAL
COMMUNITY
Start: 2022-11-02 | End: 2023-02-07

## 2022-11-22 RX ORDER — FOLIC ACID 1 MG/1
5 TABLET ORAL DAILY
Status: ON HOLD | COMMUNITY
Start: 2022-09-19 | End: 2023-06-25

## 2022-11-22 RX ORDER — POTASSIUM CHLORIDE 1500 MG/1
20 TABLET, EXTENDED RELEASE ORAL 2 TIMES DAILY
COMMUNITY
Start: 2022-10-20 | End: 2022-12-16

## 2022-11-22 RX ORDER — UREA 10 %
220 LOTION (ML) TOPICAL
COMMUNITY
Start: 2022-10-20 | End: 2023-01-18

## 2022-11-22 NOTE — PROGRESS NOTES
Psychosocial Assessment for Liver and Kidney Transplant Evaluation  Damion Quinones was seen in the Transplant Center as part of his evaluation as a potential liver transplant recipient.  He was accompanied by his wife Apoorva.  Living Situation: Casey and his wife Apoorva live together in a house in [place], [place].  They have lived at this residence for the past thirty years, and they deny any concerns.  Casey receives assistance with his medications from his wife.  He is using a walker as needed.  Casey denies any recent falls.  He is driving.  Education/Employment:  Casey graduated high school.  He also attended the [employer] for a couple of years.  He was laid off during COVID in 2019.  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   Financial /Income: Casey receives Social Security snf benefits.  His wife works full-time for [employer].  Health Insurance:  Medicare and a BCBS supplement. Medicare Part B education was provided regarding coverage for immunosuppression medications.  This writer talked with Casey about the financial risks of transplant, particularly about the high cost of transplant related medications and the importance of maintaining adequate health insurance coverage.  Family/Social Support: Damion and his wife Apoorva have been  for thirty years.  Apoorva is involved and supportive, and she identified herself as patient's primary care giver.  Casey and Apoorva have two adult children.  José Miguel lives in [facility] and Denice lives in [place], [place].  Casey's parents are .  He does not have siblings.  This writer stressed the importance of having a stable and involved support network before and after transplant.  Provided Casey and his wife with education about the relationship between a stable support system and better surgical and post-transplant outcomes compared to patients with a limited support system.    Chemical Dependency:  Casey denies any history of using tobacco  products.  He also denies any history of illicit drug use or pain medication abuse.  Casey reports his last use of alcohol was New Year's Marcela 2020, one glass of champagne.  He was previously sober since 2016. Casey reports he was drinking twelve beers per day prior to discontinuing all consumption in 2016.  This was his pattern of drinking for a couple of years.  Prior to this reports consuming an average of eighteen beers per week.  Mental Health: Casey denies any mental health history.  He denies any history of suicidal ideation or hospitalization for mental health treatment.   Adjustment to Illness:  Casey was diagnosed with hemochromatosis and alcohol related liver disease around 2016.  He reports being committed to alcohol abstinence but had a poor lapse of judgement on New Year's Marcela 2020.    This writer provided Casey and his wife Apoorva with supportive counseling throughout this interview.  This writer also encouraged Casey and Apoorva to attend the liver transplant support group for additional support and encouragement.   Impression/Recommendations:   Casey and his wife Apoorva verbalize understanding the psychosocial risks of transplant and teaching provided during this evaluation.  The Caregiver Agreement for Liver Transplantation was discussed.   Apoorva is his identified primary care giver.  Casey has been sober from alcohol since New Year's Marcela of 2020, following a one day relapse.  He was sober since 2016 prior to that.  Casey meets the sobriety criteria recommended for listing based on his self report.  He will not be required to complete a substance use assessment.  His PEth test from today is negative.  Casey has adequate finances and health insurance for transplant.  There are no mental health concerns.  Casye is a acceptable transplant candidate from a psychosocial perspective.  This writer will remain available to assist patient throughout the evaluation process and will follow patient through transplant if he is listed.  It was a  pleasure to evaluate this patient for liver transplant.   Teaching completed during assessment:  1.     Housing and relocation needs post transplant.  2.     Caregiver needs post transplant.  3.     Financial issues related to transplant.  4.     Risks of alcohol use post transplant.  5.     Common psychosocial stressors pre/post transplant.          6.     Liver Transplant support group availability.          7.     [employer] Directive-form and education provided, patient/wife report having a copy at home and agree to provide me with a copy.             Psychosocial Risks of Transplant Reviewed:  1.     Increased stress related to your emotional, family, social, employment, or   financial situation.  2.     Affect on work and/or disability benefits.  3.     Affect on future health and life insurance.  4.     Transplant outcome expectations may not be met.  5.     Mental Health risks: anxiety, depression, PTSD, guilt, grief and chronic fatigue.     NGUYEN Sawant

## 2022-11-22 NOTE — NURSING NOTE
"Chief Complaint   Patient presents with     Transplant Evaluation     Liver     Blood pressure (!) 145/87, pulse 92, height 1.702 m (5' 7\"), weight 103 kg (227 lb 1.2 oz), SpO2 99 %.    Giulia Castaneda CMA    "

## 2022-11-22 NOTE — LETTER
Date:November 29, 2022      Patient was self referred, no letter generated. Do not send.        [facility] Health Information

## 2022-11-22 NOTE — COMMITTEE REVIEW
Abdominal Committee Review Note     Evaluation Date: 11/22/2022  Committee Review Date: 11/22/2022    Organ being evaluated for: Liver    Transplant Phase: Evaluation  Transplant Status: Active    Transplant Coordinator: Meenu Saha  Transplant Surgeon:       Referring Physician: Junaid Beaulieu    Primary Diagnosis: Alcoholic Cirrhosis  Secondary Diagnosis:     Committee Review Members:  Anesthesiology Nikunj Ratliff, DO   Licensed Mental Health Santiago Coats, [facility]   Pharmacist Lolis Mora, [facility]    - Clinical Jamal Bettencourt, MOUNIKA, Joi Gonsalez, [facility]   Transplant Meenu Saha, RN, Dorcas Lamb, RN, Gabriela Wright, RN, Shaunna Shay, RN, Rafael Garcia MD, Jr Abdias Quinteros, LORIN, Ariane Hussein, APRN CNP, Michael Solares, LORIN, Antoni Mcnair MD   Transplant Hepatology  Lolis Ruddy Bermudez MD, Jose Poole MD, Magda Streeter MD, Angelita Broussard MD, Dwight Green MD, Randy Ribera MD, Thomas M. Leventhal, MD   Transplant Surgery Conor Dc MD, Fuad Rodas MD, [facility] Yolanda Ratliff MD, Chad Clifton MD       Transplant Eligibility: Cirrhosis with MELD, ETOH    Committee Review Decision: Needs Re-presentation    Relative Contraindications: Other    Absolute Contraindications: None    Committee Chair Jose Poole MD verbally attested to the committee's decision.    Committee Discussion Details:      - sleep apnea work up    - cardiac work up    - neuropathy work up     - concern for co- morbidities, potential for dialysis in near future and frailty. Will re-assess at 3 month follow up and re-discuss.

## 2022-11-22 NOTE — LETTER
11/22/2022         RE: Damion Quinones   [address] [facility]– [place] [address]        Dear Colleague,    Thank you for referring your patient, Damion Quinones, to the [facility]. Please see a copy of my visit note below.    Assessment and Plan:  1. liver transplant evaluation - patient is a fair candidate overall. Benefits and surgical risks of a liver transplantation were discussed.  2.  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 Portal Vein:Patent  2. Hepatic Artery: Open  3. TIPS: present  4. Previous Abdominal Surgery: Yes- open appendectomy, umbilical/inguinal hernia repairs  5. Hepatocellular Carcinoma: None  6. Ascites: None  7. Costal Angle: narrow  8. Portopulmonary Hypertension: absent- awaiting echo results  9. Hepatopulmonary Syndrome: absent  10. Cardiac Evaluation: needs stress echocardiogram  11. Nutritional Status: Moderate  12. Diabetes: no  13.Hypertension no  14. Smoker:no  14: Fraility index:no scored but would say borderline  15. Meets guidelines to receive Living Donor  No  16. Potential Living donors No    Recommendations: has significant co-morbidities, CKD approaching dialysis otherwise preserved synthetic function from liver, concern re overall clinical status and unlikelihood of getting an organ      Patients overall evaluation will be discussed at the Liver Transplant selection committee meeting with a final recommendation on the patients suitability for transplant to be made at that time.    Consult Full  Details:  Damion Quinones was seen in consultation at the request of Dr. Poole/Christofer/Shae for evaluation as a potential liver/kidney transplant recipient.    Reason for Visit:  Damion Quinones is a 67 year old year old male with Hemochromatosis/EtOH, who presents for liver transplant evaluation.    HPI:  Presenting complaint: kidney failure    68 y/o male with cirrhosis secondary to hemochromatosis/EtOH and CKD who presents  for transplant evaluation.  First diagnosed with liver disease when he presented with a sig upper GI bleed in 2016, ultimately discharged but subsequently re-presented in Sept of this year with another sig GI bleed requiring emergent TIPS.  Over the course of the past 6 months has had declining renal function, native renal biopsy showed ? 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  He also has history of psoriasis/psoriatic arthritis/RA for which he had been taking Humira then transitioned to to prednisone and a new biologic.  Has developed significant LE and UE edema more recently.  MELD is up to 21 with kidney function decline, relatively preserved synthetic function of liver    PMH  Cirrhosis- hemochromatosis/EtOH, complicated by GI bleed, h/o phlebotomy  A Fib- since 2016, not on chronic anticoagulation given h/o GI bleeds  Psoriasis/Psoriatic arthritis/RA  CKD V- ANCA vasculitis vs IgA, approaching the need for dialysis    PSH  Umbilical hernia  R/L inguinal hernia repairs  Open Appendectomy    Soc  EtoH- previous heavy use, quit 2016, underwent counseling previously  Tob- none    Fam  Mother- Lung CA  Father- ? Liver dz/Colon CA  3 bothers- all - lung CA- smoker, heart dz, ? Kidney dz        Past Medical History:   Diagnosis Date     Alcoholic cirrhosis of liver with ascites (H) 10/11/2019     Arthritis     RA     Coronary artery disease     AFib 2016     Diabetes mellitus type 2, uncomplicated (H) 10/11/2019     History of blood transfusion     2016     History of hemochromatosis 10/11/2019     Hypertension      Past Surgical History:   Procedure Laterality Date     APPENDECTOMY      Removed at 18 Years Old      COLONOSCOPY       at [facility].      EYE SURGERY Bilateral     Cataract     GI SURGERY      TIPS     HERNIA REPAIR      History of bilateral inguinal hernia repair: 10/28/2014. Open hernia repair: 10/2017. Abdominal wound exploration and debridement 2017      Past Surgical History:   Procedure Laterality Date     APPENDECTOMY      Removed at 18 Years Old      COLONOSCOPY      2014 at [facility].      EYE SURGERY Bilateral     Cataract     GI SURGERY      TIPS     HERNIA REPAIR      History of bilateral inguinal hernia repair: 10/28/2014. Open hernia repair: 10/2017. Abdominal wound exploration and debridement 12/27/2017     Family History   Problem Relation Age of Onset     Lung Cancer Mother      Colon Cancer Father      No Known Allergies  Prior to Admission medications    Medication Sig Start Date End Date Taking? Authorizing Provider   bumetanide (BUMEX) 1 MG tablet  10/22/22  Yes Reported, Patient   Calcium Carbonate-Vitamin D 500-3.125 MG-MCG TABS Take 1 tablet by mouth 2 times daily 9/21/22  Yes Reported, Patient   diltiazem ER (DILT-XR) 120 MG 24 hr capsule Take 120 mg by mouth 11/2/22  Yes Reported, Patient   folic acid (FOLVITE) 1 MG tablet TAKE FIVE TABLETS BY MOUTH EVERY DAY 9/19/22  Yes Reported, Patient   lactulose (CHRONULAC) 10 GM/15ML solution TAKE 45 ML BY MOUTH THREE TIMES DAILY 11/19/22  Yes Reported, Patient   pantoprazole (PROTONIX) 40 MG EC tablet Take 40 mg by mouth daily 9/16/22  Yes Reported, Patient   potassium chloride ER (KLOR-CON M) 20 MEQ CR tablet Take 20 mEq by mouth 2 times daily 10/20/22  Yes Reported, Patient   predniSONE (DELTASONE) 5 MG tablet Take 15 mg by mouth 11/8/22  Yes Reported, Patient   XIFAXAN 550 MG TABS tablet  10/31/22  Yes Reported, Patient   Zinc Sulfate 220 (50 Zn) MG TABS Take 220 mg by mouth 10/20/22 1/18/23 Yes Reported, Patient       Previous Transplant Hx: No    Cardiovascular Hx:       h/o Cardiac Issues: Yes -  Chronic A Fib       Exercise Tolerance: no chest pain or shortness of breath with exertion.    Potential Donor(s): No    ROS:    REVIEW OF SYSTEMS (check box if normal)  [x]                GENERAL  [x]                  PULMONARY [x]                 GENITOURINARY  [x]                 CNS             "     [x]                  CARDIAC  [x]                  ENDOCRINE  [x]                 EARS,NOSE,THROAT [x]                  GASTROINTESTINAL [x]                  NEUROLOGIC    [x]                 MUSCLOSKELTAL  [x]                   HEMATOLOGY    Examination:     Vitals:  BP (!) 145/87   Pulse 92   Ht 1.702 m (5' 7\")   Wt 103 kg (227 lb 1.2 oz)   SpO2 99%   BMI 35.56 kg/m      GENERAL APPEARANCE: alert and no distress  EYES: PERRL  HENT: mouth without ulcers or lesions  NECK: supple, no adenopathy  RESP: lungs clear to auscultation - no rales, rhonchi or wheezes  CV: regular rhythm, normal rate, no rub   ABDOMEN:  soft, nontender, no HSM or masses and bowel sounds normal  MS: extremities normal- no gross deformities noted, swollen LE, swollen discolored forearms/hands, stiff joints  SKIN: no rash  NEURO: Normal strength and tone, sensory exam grossly normal, mentation intact and speech normal  PSYCH: mentation appears normal. and affect normal/bright      Results:   Recent Results (from the past 168 hour(s))   EKG 12-lead, tracing only [EKG1]    Collection Time: 11/22/22  8:04 AM   Result Value Ref Range    Systolic Blood Pressure  mmHg    Diastolic Blood Pressure  mmHg    Ventricular Rate 91 BPM    Atrial Rate  BPM    DC Interval  ms    QRS Duration 74 ms     ms    QTc 447 ms    P Axis  degrees    R AXIS 57 degrees    T Axis -5 degrees    Interpretation ECG       Atrial fibrillation  Abnormal QRS-T angle, consider primary T wave abnormality  Abnormal ECG  No previous ECGs available  Confirmed by MD BRIAN, CALOS ([id number]) on 11/22/2022 1:13:44 PM     Hepatitis C antibody    Collection Time: 11/22/22  8:48 AM   Result Value Ref Range    Hepatitis C Antibody Nonreactive Nonreactive   Hepatitis B surface antigen    Collection Time: 11/22/22  8:48 AM   Result Value Ref Range    Hepatitis B Surface Antigen Nonreactive Nonreactive   Hepatitis B Surface Antibody    Collection Time: 11/22/22  8:48 AM   Result " Value Ref Range    Hepatitis B Surface Antibody Instrument Value 0.25 <8.00 m[IU]/mL    Hepatitis B Surface Antibody Nonreactive    Hepatitis B core antibody    Collection Time: 11/22/22  8:48 AM   Result Value Ref Range    Hepatitis B Core Antibody Total Nonreactive Nonreactive   Hepatitis A Antibody IgG    Collection Time: 11/22/22  8:48 AM   Result Value Ref Range    Hepatitis A Antibody IgG Nonreactive Nonreactive   CBC with platelets    Collection Time: 11/22/22  8:48 AM   Result Value Ref Range    WBC Count 14.8 (H) 4.0 - 11.0 10e3/uL    RBC Count 4.24 (L) 4.40 - 5.90 10e6/uL    Hemoglobin 13.0 (L) 13.3 - 17.7 g/dL    Hematocrit 39.6 (L) 40.0 - 53.0 %    MCV 93 78 - 100 fL    MCH 30.7 26.5 - 33.0 pg    MCHC 32.8 31.5 - 36.5 g/dL    RDW 14.5 10.0 - 15.0 %    Platelet Count 154 150 - 450 10e3/uL   INR    Collection Time: 11/22/22  8:48 AM   Result Value Ref Range    INR 1.03 0.85 - 1.15   Vitamin D Deficiency    Collection Time: 11/22/22  8:48 AM   Result Value Ref Range    Vitamin D, Total (25-Hydroxy) 41 20 - 75 ug/L   Transferrin    Collection Time: 11/22/22  8:48 AM   Result Value Ref Range    Transferrin 185.0 (L) 200.0 - 360.0 mg/dL   TSH with free T4 reflex    Collection Time: 11/22/22  8:48 AM   Result Value Ref Range    TSH 4.91 (H) 0.30 - 4.20 uIU/mL   Prostate spec antigen screen    Collection Time: 11/22/22  8:48 AM   Result Value Ref Range    Prostate Specific Antigen Screen 0.69 0.00 - 4.50 ng/mL   Phosphorus    Collection Time: 11/22/22  8:48 AM   Result Value Ref Range    Phosphorus 4.4 2.5 - 4.5 mg/dL   Iron and iron binding capacity    Collection Time: 11/22/22  8:48 AM   Result Value Ref Range    Iron 68 61 - 157 ug/dL    Iron Sat Index 31 15 - 46 %    Iron Binding Capacity 219 (L) 240 - 430 ug/dL   Hemoglobin A1c    Collection Time: 11/22/22  8:48 AM   Result Value Ref Range    Hemoglobin A1C 5.3 <5.7 %   Ferritin    Collection Time: 11/22/22  8:48 AM   Result Value Ref Range    Ferritin 429  (H) 31 - 409 ng/mL   AFP tumor marker    Collection Time: 11/22/22  8:48 AM   Result Value Ref Range    AFP tumor marker 2.6 <=8.3 ng/mL   Hepatic panel    Collection Time: 11/22/22  8:48 AM   Result Value Ref Range    Protein Total 5.8 (L) 6.4 - 8.3 g/dL    Albumin 3.1 (L) 3.5 - 5.2 g/dL    Bilirubin Total 1.3 (H) <=1.2 mg/dL    Alkaline Phosphatase 372 (H) 40 - 129 U/L    AST 91 (H) 10 - 50 U/L    ALT 91 (H) 10 - 50 U/L    Bilirubin Direct 0.67 (H) 0.00 - 0.30 mg/dL   Basic metabolic panel    Collection Time: 11/22/22  8:48 AM   Result Value Ref Range    Sodium 142 136 - 145 mmol/L    Potassium 3.6 3.4 - 5.3 mmol/L    Chloride 97 (L) 98 - 107 mmol/L    Carbon Dioxide (CO2) 31 (H) 22 - 29 mmol/L    Anion Gap 14 7 - 15 mmol/L    Urea Nitrogen 69.4 (H) 8.0 - 23.0 mg/dL    Creatinine 4.52 (H) 0.67 - 1.17 mg/dL    Calcium 9.4 8.8 - 10.2 mg/dL    Glucose 103 (H) 70 - 99 mg/dL    GFR Estimate 14 (L) >60 mL/min/1.73m2   Lipid Profile    Collection Time: 11/22/22  8:48 AM   Result Value Ref Range    Cholesterol 285 (H) <200 mg/dL    Triglycerides 96 <150 mg/dL    Direct Measure HDL 79 >=40 mg/dL    LDL Cholesterol Calculated 187 (H) <=100 mg/dL    Non HDL Cholesterol 206 (H) <130 mg/dL   Treponema Abs w Reflex to RPR and Titer    Collection Time: 11/22/22  8:48 AM   Result Value Ref Range    Treponema Antibody Total Nonreactive Nonreactive   HIV Antigen Antibody Combo Pretransplant    Collection Time: 11/22/22  8:48 AM   Result Value Ref Range    HIV Antigen Antibody Combo Pretransplant Nonreactive Nonreactive   Adult Type and Screen    Collection Time: 11/22/22  8:48 AM   Result Value Ref Range    ABO/RH(D) O POS     Antibody Screen Negative Negative    SPECIMEN EXPIRATION DATE [id number]    T4 free    Collection Time: 11/22/22  8:48 AM   Result Value Ref Range    Free T4 1.09 0.90 - 1.70 ng/dL   ABO and Rh    Collection Time: 11/22/22  8:53 AM   Result Value Ref Range    ABO/RH(D) O POS     SPECIMEN EXPIRATION DATE  [id number]    UA reflex to Microscopic and Culture    Collection Time: 11/22/22  8:58 AM    Specimen: Urine, Clean Catch   Result Value Ref Range    Color Urine Light Yellow Colorless, Straw, Light Yellow, Yellow    Appearance Urine Clear Clear    Glucose Urine Negative Negative mg/dL    Bilirubin Urine Negative Negative    Ketones Urine Negative Negative mg/dL    Specific Gravity Urine 1.009 1.003 - 1.035    Blood Urine Negative Negative    pH Urine 6.0 5.0 - 7.0    Protein Albumin Urine Negative Negative mg/dL    Urobilinogen Urine Normal Normal, 2.0 mg/dL    Nitrite Urine Negative Negative    Leukocyte Esterase Urine Negative Negative   Protein  random urine    Collection Time: 11/22/22  8:58 AM   Result Value Ref Range    Total Protein Urine mg/dL 6.8 mg/dL    Total Protein UR MG/MG CR 0.14 0.00 - 0.20 mg/mg Cr    Creatinine Urine mg/dL 50.3 mg/dL     I had a long discussion with the patient regarding liver transplantation which included but was not limited to  the following points:    1. Liver transplant selection committee process.  2. The federal rules for cadaveric waiting list, the size and blood type matching of the organ. The availability of living-related donor transplantation.  3. The types of donors: brain death donors, non-heart beating donors, partial liver grafts: splits and living donor grafts  4. Extended criteria  Donors (older age, steasosis) and the increased  risk of primary non-function using the extended criteria donors  5. The CDC high risk donors,  Risk of donor transmitted infections and donor transmitted malignancy  6. The liver transplant operation and the associated risks and technical complications which can include intraoperative death, post operative death,  Primary non-function, bleeding requiring re-operations, arterial and biliary complications, bowel perforations, and intra abdominal abscess. Some of these complicaitons may require a second operation.  7. The postoperative  course, the ICU stay and risk of postoperative complications which can include sepsis, MI, stroke, brain injury, pneumonia, pleural effusions, and renal dysfunction.  8. The current 1 year and 5 year graft and patient survivals.  9. The need for life long immunosuppressive therapy and the side effects of these medications, including the possibility of toxicity, opportunistic infections, risk of cancer including lymphoma, and the possibility of rejection even if the patient is taking the medication exactly as prescribed.  10. The need for compliance with medications and follow-up visits in the clinic and thereafter.  11. The patient and family understand these risks and wish to proceed to transplantation         Again, thank you for allowing me to participate in the care of your patient.        Sincerely,        Conor Dc MD

## 2022-11-22 NOTE — LETTER
11/22/2022         RE: Damion Quinones   [address] [facility] [address]        Dear Colleague,    Thank you for referring your patient, Damion Quinones, to the [facility] [place]. Please see a copy of my visit note below.    [facility] Hepatology    New Patient Visit    Referring provider:  Junaid Beaulieu  Chief complaint:  LT Eval    Assessment  67 year old male with PMHx of decompensated alcohol + hemochromatosis (homozygous H63D) cirrhosis complicated by variceal bleeding s/p TIPS (10/1/2022) and hepatic encephalopathy. PMHx also includes alcohol overuse, CKD (IgA nephropathy + ANCA vasculitis), psoriatic arthritis, atrial fibrillation and HTN.     In terms of the patient's liver disease he has decompensated alcohol and hemochromatosis related liver disease complicated by history of variceal bleeding at which point he underwent TIPS on 10/1/2022. No recurrent bleeding post TIPS. His post TIPS course has been complicated by hepatic encephalopathy; currently on maximal medical therapy.    At this point in time his MELD score is mostly driven by his renal dysfunction.  He is not currently on dialysis and his eGFR is 14. MELD-Na: 21. Given the patient's age and multiple co morbidities, including but not limited to atrial fibrillation (not on anticoagulation), class II obesity, frailty with limited mobility, and worsening renal function that will require dialysis, he is not an ideal candidate for simultaneous liver kidney transplantation.     ABO: O positive    Plan  -- Would benefit from seeing transplant nephrology for consideration of simultaneous liver kidney transplant  -- Cardiology workup: Plan for TTE + would benefit from seeing cardiology for atrial fibrillation management (not on anticoagulation and unclear why) + coronary assessment, such as pharmacologic stress testing  -- Appreciate social work assessment of prior alcohol use, last heavy use 2016, but slip in  12/2020  -- Given snoring and STOPBANG > 4, referral to sleep clinic for evaluation of sleep apnea  -- Continue diuretics per nephrology   -- Continue lactulose + rifaximin along with zinc replacement. Avoid sedating medications    Other:  -- CRC Surveillance: Repeat due in 2023 given 5 polyps removed in 2020; path: tubular adenomas  -- Hep B s Ab < 2.0; would benefit from vaccination given HBV non-immune    RTC: 3 months    Discussed with attending hepatologist, Dr. Virgilio Bermudez MD  Transplant Hepatology Fellow  [phone]    HPI:  Alcohol Cirrhosis  - hx HE  - no hx ascites  - hx variceal bleed s/p TIPS (10/1/2022)  - last EGD 9/2022: Small EV, IGV1 oozing, portal HTN gastropathy, IGV2 - s/p TIPS 10/1/2022  - HCC screening US 11/2022 - pending     Presented with wife Nabila.     Varices  - History of gastric variceal bleeding s/p TIPS 10/1/2022  - Since TIPS placement he has not had any issues with melena, hematochezia or hematemesis.    Hepatic Encephalopathy  - Developed HE following TIPS placement, on maximal medical therapy of lactulose + rifaximin   - Admission 9/30-10/3/2022: Presented with altered mental status following TIPS. Improved with initiation of lactulose, rifaximin and zinc initiation  - Despite initiation of maximal medical therapy he has had intermittent issues with encephalopathy.  He reports adherence to his medications + 3 soft BM per day without blood.  He denies any sedating medications.  He reports issues with sleep and often takes during the day.  He has gained weight in the setting of prednisone use and has been snoring more.    Alcohol use:  - Last drink: 12/31/2020, last heavy use 2016  - Pattern of use: 12 beers a day. Started use in teen years, increased gradually in the last 1.5-2 years.   - Chemical dependency: None since last use, previously chemical dependency programming in 1980s    CKD - IgA nephropathy + ANCA vasculitis   - eGFR 14 today with ongoing issues with  volume overload. Currently on bumex 4mg BID.   - Started on prednisone in September 2022, max dose: 80mg per day, now weaning - currently at 15mg per day  - Rituximab infusions, started 10/2022; has undergone 3 infusions and plan for next infusion in 5-6 months  - Minimal urine protein noted (6.8mg/dL)  - Myeloperoxidase Ab 9.4 (H); proteinase 3 Ab units < 0.7 (wnl); C3 97 (wnl) + C4 19.7 (wnl)    Psoriatic Arthritis  - Issues with arthritis with joint deformities in his hands bilaterally. Was on Humira from 2018 to 8/2022, now off.     HTN:  - Diltiazem 120mg daily     Atrial Fibrillation  - Denies ever being on anticoagulation  - On rate control with diltiazem as above    Hemochromatosis - H63D homozygous  - Previously underwent phlebotomy x 2 prior to 2020, none since    - Denies jaundice. Abdominal distension and lower extremity edema, but no history of ascites.   - Denies melena, hematemesis or hematochezia.  - Denies fevers, sweats or chills  - Low appetite, minimal protein intake  - s/p 3 doses of the COVID-19 vaccine    Medical hx Surgical hx   Past Medical History:   Diagnosis Date     Alcoholic cirrhosis of liver with ascites (H) 10/11/2019     Arthritis     RA     Coronary artery disease     AFib 2016     Diabetes mellitus type 2, uncomplicated (H) 10/11/2019     History of blood transfusion     2016     History of hemochromatosis 10/11/2019     Hypertension       Past Surgical History:   Procedure Laterality Date     APPENDECTOMY      Removed at 18 Years Old      COLONOSCOPY      2014 at [facility]      EYE SURGERY      Left Cataract Removal      GI SURGERY      TIPS     HERNIA REPAIR      History of bilateral inguinal hernia repair: 10/28/2014. Open hernia repair: 10/2017. Abdominal wound exploration and debridement 12/27/2017   + knee replacements x 2       Medications  Current Outpatient Medications   Medication Sig Dispense Refill     bumetanide (BUMEX) 1 MG tablet        Calcium  "Carbonate-Vitamin D 500-3.125 MG-MCG TABS Take 1 tablet by mouth 2 times daily       diltiazem ER (DILT-XR) 120 MG 24 hr capsule Take 120 mg by mouth       folic acid (FOLVITE) 1 MG tablet TAKE FIVE TABLETS BY MOUTH EVERY DAY       lactulose (CHRONULAC) 10 GM/15ML solution TAKE 45 ML BY MOUTH THREE TIMES DAILY       pantoprazole (PROTONIX) 40 MG EC tablet Take 40 mg by mouth daily       potassium chloride ER (KLOR-CON M) 20 MEQ CR tablet Take 20 mEq by mouth 2 times daily       predniSONE (DELTASONE) 5 MG tablet Take 15 mg by mouth       XIFAXAN 550 MG TABS tablet        Zinc Sulfate 220 (50 Zn) MG TABS Take 220 mg by mouth         Allergies  No Known Allergies    Family hx Social hx   Family History   Problem Relation Age of Onset     Lung Cancer Mother      Colon Cancer Father    - Brother: lung cancer in 50s  - Brother: heart disease (unclear what), renal disease (unclear what)  - Brother: colon issues (unclear what)   - Alcohol: as above  - Tobacco: Denies  - Drugs: Denies  -  (Apoorva), has two adult children   - Retired, previously in IT. Hasn't worked sine 2019.  - Stays active: gardening, hunting + fishing, working on old cars     Review of systems  A 10-point review of systems was negative.    Examination  BP (!) 145/87   Pulse 92   Ht 1.702 m (5' 7\")   Wt 103 kg (227 lb 1.2 oz)   SpO2 99%   BMI 35.56 kg/m    Body mass index is 35.56 kg/m .    Gen-NAD  Eye- EOMI  ENT- MMM, normal oropharynx  CVS- Irregular rate and rhythm.   RS- CTA bilaterally  Abd- Distended. Non-tender throughout. Umbilical hernia  Extr- 2+ radial pulses bilaterally, 1+ lower extremity edema bilaterally  MS- hands without clubbing  Neuro- A+Ox3, mild asterixis  Skin- no rash. No jaundice. Duptyrens contractures bilaterally.  Psych- normal mood    Laboratory  BMP  Recent Labs   Lab Test 11/22/22  0848 08/27/19  0000     --    POTASSIUM 3.6 4.6   CHLORIDE 97*  --    NAZARIO 9.4  --    CO2 31*  --    BUN 69.4*  --    CR 4.52* 1.11 "   * 102*     CBC  Recent Labs   Lab Test 11/22/22  0848   WBC 14.8*   RBC 4.24*   HGB 13.0*   HCT 39.6*   MCV 93   MCH 30.7   MCHC 32.8   RDW 14.5        Liver Enzymes   Recent Labs   Lab Test 11/22/22  0848   PROTTOTAL 5.8*   ALBUMIN 3.1*   BILITOTAL 1.3*   ALKPHOS 372*   AST 91*   ALT 91*      INR   INR   Date Value Ref Range Status   11/22/2022 1.03 0.85 - 1.15 Final      Iron sat 41%; ferritin 896  MIGUEL A positive  Hep A IgG negative  Hep B s Ag negative  Hep B s Ab < 2.0  Hep B c Ab negative  Hep C ab negative    Hemochromatosis: negative C282Y, homozygous H63D, negative S65C    Radiology  Abdominal US 10/18/22  1. Patent TIPS without definite evidence for significant stenosis.   2. Left Perinephric fluid collection. This likely represents a perinephric hematoma as the patient had a biopsy of the left kidney on 09/09/2022. This is slightly smaller than noted on the previous exam.      TTE 9/25/22  1. Technically difficult exam.   2. The left ventricular size is normal.  Systolic function is normal.The estimated ejection fraction is 60-65%.  Wall thickness is mildly to moderately increased.  Left ventricular segmental wall motion is grossly normal, however endocardial definition is limited.   3. Visualized limited portions of the right ventricle are normal.  4. The left atrium is severely enlarged.   5. There is likely borderline aortic valve stenosis with a peak velocity of 149.00 cm/s, mean gradient of 5 mmHg, and aortic valve area of 1.87 cm2. Dimensionless indez of 0.54  6. The proximal ascending aorta measures 3.80 cm with an index of 1.66 cm/m2.   7. There is no gross pericardial effusion.     Renal Biopsy 9/9/2022  A-C. Native Kidney biopsy:  - Focal global and focal segmental glomerular sclerosis (13% and 7%) with glomerular IgA deposition, see comment.  Interstitial hemorrhage and tubulointerstitial inflammation.  - Severe interstitial fibrosis and tubular atrophy (~80%).  - Severe  "arteriosclerosis.     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 In regards to the MPO positivity, necrotizing/crescentic glomular injury is not observed, and overt vasculitis is not identified. However, the presence of interstitial hemorrhage and tubulointerstitial inflammation raise the possibility of an unsampled vasculitis.    DEXA 2018: No evidence of osteopenia/osteoporosis.     Endoscopy  EGD 9/2022: Small EV, IGV1 oozing, portal HTN gastropathy, IGV2, no gastric ulcers, normal duodenum    Colonoscopy 7/31/2020:  - Four 2 to 3 mm polyps in the descending colon, in  the transverse colon and in the ascending colon, removed with a jumbo cold forceps. Resected and retrieved.  - One 8 mm polyp in the descending colon, removed with a hot snare. Resected and retrieved. Clip (MR conditional) was placed.   Pathology: Tubular adenomas     Attestation signed by Jose Poole MD at 11/27/2022  5:56 PM:  The patient was seen and examined.  The above assessment and plan was developed jointly with the fellow.      Thank you very much for allowing me to participate in the care of this patient.  If you have any questions regarding my recommendations, please do not hesitate to contact me.         Jose Poole MD      Professor of Medicine  [employer]      Executive Medical Director, Solid Organ Transplant Program  [facility]        Again, thank you for allowing me to participate in the care of your patient.        Sincerely,        Jose Poole MD    "

## 2022-11-22 NOTE — NURSING NOTE
Next Tuesday 11/29 1130 neuro video 10 [email or web address]    Fried Frailty Test    Frail, 4    Pertinent Comments on Frailty Test Components:  Weight: 103kg  Wt Readings from Last 10 Encounters:   11/22/22 103 kg (227 lb 1.2 oz)   11/22/22 103 kg (227 lb 1.2 oz)   10/27/22 103.4 kg (228 lb)   09/30/19 103.9 kg (229 lb)       Reported Exhaustion/Energy Levels: 1, reports lately it has been harder to get going for the day    Activity: recently started walking a little. About 20 minutes each time and would guess it would average once a week for the last three months.         Additional Comments:     Recommended Interventions to Address Frailty:    Giulia Castaneda, CMA

## 2022-11-22 NOTE — NURSING NOTE
"Chief Complaint   Patient presents with     Transplant Evaluation     Liver and possibly kidney     Blood pressure (!) 145/87, pulse 92, height 1.702 m (5' 7\"), weight 103 kg (227 lb 1.2 oz), SpO2 99 %.    Giulia Castaneda, ROHINI    "

## 2022-11-22 NOTE — PROGRESS NOTES
[facility] Liver Transplant [place] Consultation  (Does not meet [place] Statute 245G.05 requirements)  Refer to EMR for current [place] Assessment    Patient Name:  Damion Quinones    MR #: [id number]    Date of Consultation:  November 22, 2022    Patient Consultation Location:     [address]    Liver Transplant Screening Consultation    EVALUATION COUNSELOR:  LISA Ascencio  [phone]    Patient's address:     [place] [address]    Telephone:      [phone]    Statistics    YOB: 1955    Age: 67 year old    Sex: male    Marital Status:     REFERRAL SOURCE: [facility] Liver Transplant screening referral interview - see EMR.     REASON FOR Consulation: Past history of significant alcohol consumption and current diagnosis  Of Alcoholic Cirrhosis of liver.     HEALTH HISTORY AND MEDICATION:     Past Medical History:   Diagnosis Date     Alcoholic cirrhosis of liver with ascites (H) 10/11/2019     Arthritis     RA     Coronary artery disease     AFib 2016     Diabetes mellitus type 2, uncomplicated (H) 10/11/2019     History of blood transfusion     2016     History of hemochromatosis 10/11/2019     Hypertension        Current Outpatient Medications   Medication     bumetanide (BUMEX) 1 MG tablet     Calcium Carbonate-Vitamin D 500-3.125 MG-MCG TABS     diltiazem ER (DILT-XR) 120 MG 24 hr capsule     folic acid (FOLVITE) 1 MG tablet     lactulose (CHRONULAC) 10 GM/15ML solution     pantoprazole (PROTONIX) 40 MG EC tablet     potassium chloride ER (KLOR-CON M) 20 MEQ CR tablet     predniSONE (DELTASONE) 5 MG tablet     XIFAXAN 550 MG TABS tablet     Zinc Sulfate 220 (50 Zn) MG TABS     No current facility-administered medications for this visit.     MELD-Na score: 21 at 11/22/2022  8:48 AM  MELD score: 21 at 11/22/2022  8:48 AM  Calculated from:  Serum Creatinine: 4.52 mg/dL (Using max of 4 mg/dL) at 11/22/2022  8:48 AM  Serum Sodium: 142 mmol/L (Using max of 137  "mmol/L) at 11/22/2022  8:48 AM  Total Bilirubin: 1.3 mg/dL at 11/22/2022  8:48 AM  INR(ratio): 1.03 at 11/22/2022  8:48 AM  Age: 67 years    HISTORY OF PREVIOUS TREATMENT AND COUNSELING: None     HISTORY OF ALCOHOL AND DRUG USE: Reports remote history of alcohol use (from LT Screening phone interview): One use episode 1/1/20. Prior to that ELIANE reported 2016 at same time he received diagnosis of ALD.    At which time \"he just quit\" and used his family and friends as his support for maintaining abstinence from alcohol.      SUMMARY OF CD SYMPTOMS ACKNOWLEDGED BY THE PATIENT: None    SUMMARY OF COLLATERAL DATA:  Wife was in room with client and collaborated with patient regarding   Use history.      MENTAL HEALTH STATUS: TBD    CONSULTATION IMPRESSION (Stage of Change for addressing [place]):    Client appears to be in action stage for sobriety maintenance.     ASAM PLACEMENT CRITERIA BASED ON CONSULT:  (Does not replace [place] Assessment criteria if required)    Intoxication and Withdrawal: 0  Biomedical Conditions: 3  Emotional and Behavioral: 1  Readiness to Change: 0  Relapse Potential: 0  Recovery Environment: 1    LIVER TRANSPLANT CANDIDACY CONSULT OBSERVATION: Unless there is data or information to the contrary, this patient does not appear to be challenged by maintaining sobriety from alcohol. I would consider him  To be a candidate for being included on LT list if necessary.     [place]/OTHER RECOMMENDATIONS:      Remain abstinent from all non prescribed mood altering chemicals.   Continue to use family and friends as support network for sobriety.   Contact this counselor if patient has questions or is struggling with sobriety.     LISA Ascencio      "

## 2022-11-23 PROBLEM — E66.01 MORBID OBESITY (H): Status: ACTIVE | Noted: 2022-11-23

## 2022-11-23 LAB — ETHYL GLUCURONIDE UR QL SCN: NEGATIVE NG/ML

## 2022-11-23 NOTE — PROGRESS NOTES
Assessment and Plan:  1. liver transplant evaluation - patient is a fair candidate overall. Benefits and surgical risks of a liver transplantation were discussed.  2.  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 Portal Vein:Patent  2. Hepatic Artery: Open  3. TIPS: present  4. Previous Abdominal Surgery: Yes- open appendectomy, umbilical/inguinal hernia repairs  5. Hepatocellular Carcinoma: None  6. Ascites: None  7. Costal Angle: narrow  8. Portopulmonary Hypertension: absent- awaiting echo results  9. Hepatopulmonary Syndrome: absent  10. Cardiac Evaluation: needs stress echocardiogram  11. Nutritional Status: Moderate  12. Diabetes: no  13.Hypertension no  14. Smoker:no  14: Fraility index:no scored but would say borderline  15. Meets guidelines to receive Living Donor  No  16. Potential Living donors No    Recommendations: has significant co-morbidities, CKD approaching dialysis otherwise preserved synthetic function from liver, concern re overall clinical status and unlikelihood of getting an organ      Patients overall evaluation will be discussed at the Liver Transplant selection committee meeting with a final recommendation on the patients suitability for transplant to be made at that time.    Consult Full  Details:  Damion Quinones was seen in consultation at the request of Dr. Poole/Christofer/Shae for evaluation as a potential liver/kidney transplant recipient.    Reason for Visit:  Damion Quinones is a 67 year old year old male with Hemochromatosis/EtOH, who presents for liver transplant evaluation.    HPI:  Presenting complaint: kidney failure    66 y/o male with cirrhosis secondary to hemochromatosis/EtOH and CKD who presents for transplant evaluation.  First diagnosed with liver disease when he presented with a sig upper GI bleed in 2016, ultimately discharged but subsequently re-presented in Sept of this year with another sig GI bleed requiring emergent TIPS.  Over the  course of the past 6 months has had declining renal function, native renal biopsy showed ? 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  He also has history of psoriasis/psoriatic arthritis/RA for which he had been taking Humira then transitioned to to prednisone and a new biologic.  Has developed significant LE and UE edema more recently.  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 Liver dz/Colon CA  3 bothers- all - lung CA- smoker, heart dz, ? Kidney dz        Past Medical History:   Diagnosis Date     Alcoholic cirrhosis of liver with ascites (H) 10/11/2019     Arthritis     RA     Coronary artery disease     AFib 2016     Diabetes mellitus type 2, uncomplicated (H) 10/11/2019     History of blood transfusion     2016     History of hemochromatosis 10/11/2019     Hypertension      Past Surgical History:   Procedure Laterality Date     APPENDECTOMY      Removed at 18 Years Old      COLONOSCOPY       at [facility].      EYE SURGERY Bilateral     Cataract     GI SURGERY      TIPS     HERNIA REPAIR      History of bilateral inguinal hernia repair: 10/28/2014. Open hernia repair: 10/2017. Abdominal wound exploration and debridement 2017     Past Surgical History:   Procedure Laterality Date     APPENDECTOMY      Removed at 18 Years Old      COLONOSCOPY       at [facility].      EYE SURGERY Bilateral     Cataract     GI SURGERY      TIPS     HERNIA REPAIR      History of  bilateral inguinal hernia repair: 10/28/2014. Open hernia repair: 10/2017. Abdominal wound exploration and debridement 12/27/2017     Family History   Problem Relation Age of Onset     Lung Cancer Mother      Colon Cancer Father      No Known Allergies  Prior to Admission medications    Medication Sig Start Date End Date Taking? Authorizing Provider   bumetanide (BUMEX) 1 MG tablet  10/22/22  Yes Reported, Patient   Calcium Carbonate-Vitamin D 500-3.125 MG-MCG TABS Take 1 tablet by mouth 2 times daily 9/21/22  Yes Reported, Patient   diltiazem ER (DILT-XR) 120 MG 24 hr capsule Take 120 mg by mouth 11/2/22  Yes Reported, Patient   folic acid (FOLVITE) 1 MG tablet TAKE FIVE TABLETS BY MOUTH EVERY DAY 9/19/22  Yes Reported, Patient   lactulose (CHRONULAC) 10 GM/15ML solution TAKE 45 ML BY MOUTH THREE TIMES DAILY 11/19/22  Yes Reported, Patient   pantoprazole (PROTONIX) 40 MG EC tablet Take 40 mg by mouth daily 9/16/22  Yes Reported, Patient   potassium chloride ER (KLOR-CON M) 20 MEQ CR tablet Take 20 mEq by mouth 2 times daily 10/20/22  Yes Reported, Patient   predniSONE (DELTASONE) 5 MG tablet Take 15 mg by mouth 11/8/22  Yes Reported, Patient   XIFAXAN 550 MG TABS tablet  10/31/22  Yes Reported, Patient   Zinc Sulfate 220 (50 Zn) MG TABS Take 220 mg by mouth 10/20/22 1/18/23 Yes Reported, Patient       Previous Transplant Hx: No    Cardiovascular Hx:       h/o Cardiac Issues: Yes -  Chronic A Fib       Exercise Tolerance: no chest pain or shortness of breath with exertion.    Potential Donor(s): No    ROS:    REVIEW OF SYSTEMS (check box if normal)  [x]                GENERAL  [x]                  PULMONARY [x]                 GENITOURINARY  [x]                 CNS                 [x]                  CARDIAC  [x]                  ENDOCRINE  [x]                 EARS,NOSE,THROAT [x]                  GASTROINTESTINAL [x]                  NEUROLOGIC    [x]                 MUSCLOSKELTAL  [x]                    "HEMATOLOGY    Examination:     Vitals:  BP (!) 145/87   Pulse 92   Ht 1.702 m (5' 7\")   Wt 103 kg (227 lb 1.2 oz)   SpO2 99%   BMI 35.56 kg/m      GENERAL APPEARANCE: alert and no distress  EYES: PERRL  HENT: mouth without ulcers or lesions  NECK: supple, no adenopathy  RESP: lungs clear to auscultation - no rales, rhonchi or wheezes  CV: regular rhythm, normal rate, no rub   ABDOMEN:  soft, nontender, no HSM or masses and bowel sounds normal  MS: extremities normal- no gross deformities noted, swollen LE, swollen discolored forearms/hands, stiff joints  SKIN: no rash  NEURO: Normal strength and tone, sensory exam grossly normal, mentation intact and speech normal  PSYCH: mentation appears normal. and affect normal/bright      Results:   Recent Results (from the past 168 hour(s))   EKG 12-lead, tracing only [EKG1]    Collection Time: 11/22/22  8:04 AM   Result Value Ref Range    Systolic Blood Pressure  mmHg    Diastolic Blood Pressure  mmHg    Ventricular Rate 91 BPM    Atrial Rate  BPM    MT Interval  ms    QRS Duration 74 ms     ms    QTc 447 ms    P Axis  degrees    R AXIS 57 degrees    T Axis -5 degrees    Interpretation ECG       Atrial fibrillation  Abnormal QRS-T angle, consider primary T wave abnormality  Abnormal ECG  No previous ECGs available  Confirmed by MD BRIAN, CALOS ([id number]) on 11/22/2022 1:13:44 PM     Hepatitis C antibody    Collection Time: 11/22/22  8:48 AM   Result Value Ref Range    Hepatitis C Antibody Nonreactive Nonreactive   Hepatitis B surface antigen    Collection Time: 11/22/22  8:48 AM   Result Value Ref Range    Hepatitis B Surface Antigen Nonreactive Nonreactive   Hepatitis B Surface Antibody    Collection Time: 11/22/22  8:48 AM   Result Value Ref Range    Hepatitis B Surface Antibody Instrument Value 0.25 <8.00 m[IU]/mL    Hepatitis B Surface Antibody Nonreactive    Hepatitis B core antibody    Collection Time: 11/22/22  8:48 AM   Result Value Ref Range    Hepatitis B " Core Antibody Total Nonreactive Nonreactive   Hepatitis A Antibody IgG    Collection Time: 11/22/22  8:48 AM   Result Value Ref Range    Hepatitis A Antibody IgG Nonreactive Nonreactive   CBC with platelets    Collection Time: 11/22/22  8:48 AM   Result Value Ref Range    WBC Count 14.8 (H) 4.0 - 11.0 10e3/uL    RBC Count 4.24 (L) 4.40 - 5.90 10e6/uL    Hemoglobin 13.0 (L) 13.3 - 17.7 g/dL    Hematocrit 39.6 (L) 40.0 - 53.0 %    MCV 93 78 - 100 fL    MCH 30.7 26.5 - 33.0 pg    MCHC 32.8 31.5 - 36.5 g/dL    RDW 14.5 10.0 - 15.0 %    Platelet Count 154 150 - 450 10e3/uL   INR    Collection Time: 11/22/22  8:48 AM   Result Value Ref Range    INR 1.03 0.85 - 1.15   Vitamin D Deficiency    Collection Time: 11/22/22  8:48 AM   Result Value Ref Range    Vitamin D, Total (25-Hydroxy) 41 20 - 75 ug/L   Transferrin    Collection Time: 11/22/22  8:48 AM   Result Value Ref Range    Transferrin 185.0 (L) 200.0 - 360.0 mg/dL   TSH with free T4 reflex    Collection Time: 11/22/22  8:48 AM   Result Value Ref Range    TSH 4.91 (H) 0.30 - 4.20 uIU/mL   Prostate spec antigen screen    Collection Time: 11/22/22  8:48 AM   Result Value Ref Range    Prostate Specific Antigen Screen 0.69 0.00 - 4.50 ng/mL   Phosphorus    Collection Time: 11/22/22  8:48 AM   Result Value Ref Range    Phosphorus 4.4 2.5 - 4.5 mg/dL   Iron and iron binding capacity    Collection Time: 11/22/22  8:48 AM   Result Value Ref Range    Iron 68 61 - 157 ug/dL    Iron Sat Index 31 15 - 46 %    Iron Binding Capacity 219 (L) 240 - 430 ug/dL   Hemoglobin A1c    Collection Time: 11/22/22  8:48 AM   Result Value Ref Range    Hemoglobin A1C 5.3 <5.7 %   Ferritin    Collection Time: 11/22/22  8:48 AM   Result Value Ref Range    Ferritin 429 (H) 31 - 409 ng/mL   AFP tumor marker    Collection Time: 11/22/22  8:48 AM   Result Value Ref Range    AFP tumor marker 2.6 <=8.3 ng/mL   Hepatic panel    Collection Time: 11/22/22  8:48 AM   Result Value Ref Range    Protein Total 5.8  (L) 6.4 - 8.3 g/dL    Albumin 3.1 (L) 3.5 - 5.2 g/dL    Bilirubin Total 1.3 (H) <=1.2 mg/dL    Alkaline Phosphatase 372 (H) 40 - 129 U/L    AST 91 (H) 10 - 50 U/L    ALT 91 (H) 10 - 50 U/L    Bilirubin Direct 0.67 (H) 0.00 - 0.30 mg/dL   Basic metabolic panel    Collection Time: 11/22/22  8:48 AM   Result Value Ref Range    Sodium 142 136 - 145 mmol/L    Potassium 3.6 3.4 - 5.3 mmol/L    Chloride 97 (L) 98 - 107 mmol/L    Carbon Dioxide (CO2) 31 (H) 22 - 29 mmol/L    Anion Gap 14 7 - 15 mmol/L    Urea Nitrogen 69.4 (H) 8.0 - 23.0 mg/dL    Creatinine 4.52 (H) 0.67 - 1.17 mg/dL    Calcium 9.4 8.8 - 10.2 mg/dL    Glucose 103 (H) 70 - 99 mg/dL    GFR Estimate 14 (L) >60 mL/min/1.73m2   Lipid Profile    Collection Time: 11/22/22  8:48 AM   Result Value Ref Range    Cholesterol 285 (H) <200 mg/dL    Triglycerides 96 <150 mg/dL    Direct Measure HDL 79 >=40 mg/dL    LDL Cholesterol Calculated 187 (H) <=100 mg/dL    Non HDL Cholesterol 206 (H) <130 mg/dL   Treponema Abs w Reflex to RPR and Titer    Collection Time: 11/22/22  8:48 AM   Result Value Ref Range    Treponema Antibody Total Nonreactive Nonreactive   HIV Antigen Antibody Combo Pretransplant    Collection Time: 11/22/22  8:48 AM   Result Value Ref Range    HIV Antigen Antibody Combo Pretransplant Nonreactive Nonreactive   Adult Type and Screen    Collection Time: 11/22/22  8:48 AM   Result Value Ref Range    ABO/RH(D) O POS     Antibody Screen Negative Negative    SPECIMEN EXPIRATION DATE [id number]    T4 free    Collection Time: 11/22/22  8:48 AM   Result Value Ref Range    Free T4 1.09 0.90 - 1.70 ng/dL   ABO and Rh    Collection Time: 11/22/22  8:53 AM   Result Value Ref Range    ABO/RH(D) O POS     SPECIMEN EXPIRATION DATE [id number]    UA reflex to Microscopic and Culture    Collection Time: 11/22/22  8:58 AM    Specimen: Urine, Clean Catch   Result Value Ref Range    Color Urine Light Yellow Colorless, Straw, Light Yellow, Yellow    Appearance Urine  Clear Clear    Glucose Urine Negative Negative mg/dL    Bilirubin Urine Negative Negative    Ketones Urine Negative Negative mg/dL    Specific Gravity Urine 1.009 1.003 - 1.035    Blood Urine Negative Negative    pH Urine 6.0 5.0 - 7.0    Protein Albumin Urine Negative Negative mg/dL    Urobilinogen Urine Normal Normal, 2.0 mg/dL    Nitrite Urine Negative Negative    Leukocyte Esterase Urine Negative Negative   Protein  random urine    Collection Time: 11/22/22  8:58 AM   Result Value Ref Range    Total Protein Urine mg/dL 6.8 mg/dL    Total Protein UR MG/MG CR 0.14 0.00 - 0.20 mg/mg Cr    Creatinine Urine mg/dL 50.3 mg/dL     I had a long discussion with the patient regarding liver transplantation which included but was not limited to  the following points:    1. Liver transplant selection committee process.  2. The federal rules for cadaveric waiting list, the size and blood type matching of the organ. The availability of living-related donor transplantation.  3. The types of donors: brain death donors, non-heart beating donors, partial liver grafts: splits and living donor grafts  4. Extended criteria  Donors (older age, steasosis) and the increased  risk of primary non-function using the extended criteria donors  5. The CDC high risk donors,  Risk of donor transmitted infections and donor transmitted malignancy  6. The liver transplant operation and the associated risks and technical complications which can include intraoperative death, post operative death,  Primary non-function, bleeding requiring re-operations, arterial and biliary complications, bowel perforations, and intra abdominal abscess. Some of these complicaitons may require a second operation.  7. The postoperative course, the ICU stay and risk of postoperative complications which can include sepsis, MI, stroke, brain injury, pneumonia, pleural effusions, and renal dysfunction.  8. The current 1 year and 5 year graft and patient survivals.  9. The  need for life long immunosuppressive therapy and the side effects of these medications, including the possibility of toxicity, opportunistic infections, risk of cancer including lymphoma, and the possibility of rejection even if the patient is taking the medication exactly as prescribed.  10. The need for compliance with medications and follow-up visits in the clinic and thereafter.  11. The patient and family understand these risks and wish to proceed to transplantation

## 2022-11-24 ENCOUNTER — TRANSFERRED RECORDS (OUTPATIENT)
Dept: HEALTH INFORMATION MANAGEMENT | Facility: CLINIC | Age: 67
End: 2022-11-24

## 2022-11-25 LAB
AMPHETAMINES SERPL QL SCN: NEGATIVE NG/ML
ANNOTATION COMMENT IMP: NORMAL
BARBITURATES SERPL QL SCN: NEGATIVE NG/ML
BENZODIAZ SERPL QL SCN: NEGATIVE NG/ML
BUPRENORPHINE SERPL-MCNC: NEGATIVE NG/ML
CANNABINOIDS SERPL QL SCN: NEGATIVE NG/ML
CMV IGG SERPL IA-ACNC: >10 U/ML
CMV IGG SERPL IA-ACNC: ABNORMAL
COCAINE SERPL QL SCN: NEGATIVE NG/ML
EBV VCA IGG SER IA-ACNC: >750 U/ML
EBV VCA IGG SER IA-ACNC: POSITIVE
GAMMA INTERFERON BACKGROUND BLD IA-ACNC: 0.08 IU/ML
M TB IFN-G BLD-IMP: NEGATIVE
M TB IFN-G CD4+ BCKGRND COR BLD-ACNC: 9.92 IU/ML
METHADONE SERPL QL SCN: NEGATIVE NG/ML
METHAMPHET SERPL QL: NEGATIVE NG/ML
MITOGEN IGNF BCKGRD COR BLD-ACNC: 0.02 IU/ML
MITOGEN IGNF BCKGRD COR BLD-ACNC: 0.03 IU/ML
OPIATES SERPL QL SCN: NEGATIVE NG/ML
OXYCODONE SERPL QL: NEGATIVE NG/ML
PCP SERPL QL SCN: NEGATIVE NG/ML
PLPETH BLD-MCNC: <10 NG/ML
POPETH BLD-MCNC: <10 NG/ML
QUANTIFERON MITOGEN: 10 IU/ML
QUANTIFERON NIL TUBE: 0.08 IU/ML
QUANTIFERON TB1 TUBE: 0.1 IU/ML
QUANTIFERON TB2 TUBE: 0.11

## 2022-11-29 ENCOUNTER — VIRTUAL VISIT (OUTPATIENT)
Dept: TRANSPLANT | Facility: CLINIC | Age: 67
End: 2022-11-29
Attending: INTERNAL MEDICINE
Payer: COMMERCIAL

## 2022-11-29 VITALS — WEIGHT: 225 LBS | BODY MASS INDEX: 35.24 KG/M2

## 2022-11-29 DIAGNOSIS — N18.5 STAGE 5 CHRONIC KIDNEY DISEASE NOT ON CHRONIC DIALYSIS (H): Primary | ICD-10-CM

## 2022-11-29 DIAGNOSIS — E66.01 MORBID OBESITY (H): ICD-10-CM

## 2022-11-29 DIAGNOSIS — K70.30 ALCOHOLIC CIRRHOSIS OF LIVER WITHOUT ASCITES (H): ICD-10-CM

## 2022-11-29 DIAGNOSIS — I48.0 PAF (PAROXYSMAL ATRIAL FIBRILLATION) (H): ICD-10-CM

## 2022-11-29 DIAGNOSIS — E87.6 HYPOKALEMIA: ICD-10-CM

## 2022-11-29 DIAGNOSIS — L40.50 PSORIATIC ARTHRITIS (H): ICD-10-CM

## 2022-11-29 PROCEDURE — 99417 PROLNG OP E/M EACH 15 MIN: CPT

## 2022-11-29 PROCEDURE — 99205 OFFICE O/P NEW HI 60 MIN: CPT | Mod: 95

## 2022-11-29 NOTE — LETTER
"    11/29/2022         RE: Damion Quinones   [address] [facility] [address]        Dear Colleague,    Thank you for referring your patient, Damion Quinones, to the [facility]. Please see a copy of my visit note below.      TRANSPLANT NEPHROLOGY RECIPIENT EVALUATION NOTE    Assessment and Plan:  # Liver/Kidney Transplant Evaluation: Patient is a good candidate overall. Benefits of a living donor transplant were discussed.    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 When ready he would likely benefit from a kidney transplant. He does meet UNOS requirements for kidney transplant. If approved for liver, approved for kidney.     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     +MPO antibody      On pred taper,  down to 10 mg daily.     # Psoriatic arthritis: since age 32. Used Humira for many years, off since 9/2022 with declining kidney function. Sxs stable. Followed by outside rheum.      # End stage liver disease due to ETOH and hereditary hemochromatosis:  in eval for liver tx.     # ETOH: reportedly sober since 2016.     # Cardiac Risk: PAF, not on AC d/t h/o GI bleeding. Needs risk assessment.     # PAD Screening: per surgery.     # BMI 35.6     # Health Maintenance: Colonoscopy: Up to date and Dental: Up to date    Discussed the risks and benefits of a transplant, including the risk of surgery and immunosuppression medications.  Patient's overall evaluation will be discussed in the Transplant Program's regular meeting with a final recommendation on the patients suitability for transplant to be made at that time.    Pending completion of the full evaluation, patient " "presently appears to be enough of an acceptable kidney transplant recipient candidate to have any potential kidney donors start the evaluation process.      Evaluation:  Damion Quinones was seen in consultation at the request of Dr. Dc for evaluation as a potential liver/kidney transplant recipient.    Reason for Visit:  Damion Quinones is a 67 year old male with CKD from unclear etiology, who presents for liver/kidney transplant evaluation.    Patient was seen in conjunction with Dr. Jeovany Scott as part of a shared visit.    History of Present Illness:  Damion Quinones is a 67-year-old being evaluated for SLK with history of alcohol cirhosis and kidney disease from unclear etiology. Psoriatic arthritis since age 32. H/o CKD since 2017.  In 7/2018 he presented with fairly extensive plaques throughout the upper and lower extremities along with active inflammatory arthritis . At that point was started on Humira with complete response. In 2020 baseline creatinine was 1.5-1.6->2.2-2.4 in 2021 -> 3.15 in August 2022 ->4.88 in September 2022. Further workup notable for a positive MPO antibody. Urinalysis with mild proteinuria, without significant hematuria. 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 He was started on prednisone 80 mg daily on 9/16 (now down to 10 mg daily, with plans to taper off). Needed one round of dialysis in Sept 2022 with TIPs. Most recent GFR was 14.             Kidney Disease Hx:               Kidney Disease Dx: unclear etiology        Biopsy Proven: Yes; as " above             On Dialysis: No       Primary Nephrologist: Dr. Kaufman        H/o Kidney Stones: No       H/o Recurrent/Frequent UTI: No         Diabetic Hx: None           Cardiac/Vascular Disease Risk Factors:        Cardiac Risk Factors: Hypertension, CKD and Age (Male > 55, Female > 65)       Known CAD: No       Known PAD/Caludication Symptoms: No       Known Heart Failure: No       Arrhythmia: Yes; PAF, not on AC d/t h/o GI bleeding.        Pulmonary Hypertension: No       Valvular Disease: No       Other: None         Viral Serology Status       CMV IgG Antibody: Unknown       EBV IgG Antibody: Unknown         Volume Status/Weight:        Volume status: Not assessed       Weight:  BMI above guidelines       BMI: Body mass index is 35.24 kg/m .         Functional Capacity/Frailty:      Retired in 2019. Walks down long driveway to mailbox. Limited to walking 400 feet d/t fatigue.  Stayed active with hunting/fishing in prior years.      Fatigue/Decreased Energy: [x] No [] Yes    Chest Pain or SOB with Exertion: [x] No [] Yes    Significant Weight Change: [x] No [] Yes    Nausea, Vomiting or Diarrhea: [x] No [] Yes    Fever, Sweats or Chills:  [x] No [] Yes    Leg Swelling [x] No [] Yes        History of Cancer: None      COVID Vaccination Up To Date: Yes    Potential Living Kidney Donors: Yes     Review of Systems:  A comprehensive review of systems was obtained and negative, except as noted in the HPI or PMH.    Past Medical History:   Medical record was reviewed and PMH was discussed with patient and noted below.  Past Medical History:   Diagnosis Date     Alcoholic cirrhosis of liver with ascites (H) 10/11/2019     Arthritis     RA     Chronic kidney disease      Coronary artery disease     AFib 2016     Diabetes mellitus type 2, uncomplicated (H) 10/11/2019     History of blood transfusion     2016     History of hemochromatosis 10/11/2019     Hypertension      Hypokalemia      Obesity      PAF (paroxysmal  atrial fibrillation) (H)      Psoriatic arthritis (H)        Past Social History:   Past Surgical History:   Procedure Laterality Date     APPENDECTOMY      Removed at 18 Years Old      COLONOSCOPY      2014 at [facility].      EYE SURGERY Bilateral     Cataract     GI SURGERY      TIPS     HERNIA REPAIR      History of bilateral inguinal hernia repair: 10/28/2014. Open hernia repair: 10/2017. Abdominal wound exploration and debridement 12/27/2017     Personal history of bleeding or anesthesia problems: No    Family History:  Family History   Problem Relation Age of Onset     Lung Cancer Mother      Colon Cancer Father      Lung Cancer Brother      Heart Failure Brother      Kidney Disease Brother        Personal History:   Social History     Socioeconomic History     Marital status:      Spouse name: Not on file     Number of children: Not on file     Years of education: Not on file     Highest education level: Not on file   Occupational History     Not on file   Tobacco Use     Smoking status: Never     Smokeless tobacco: Never   Substance and Sexual Activity     Alcohol use: Not Currently     Comment: Last ETOH use was 12/31/2021     Drug use: Not Currently     Sexual activity: Not on file   Other Topics Concern     Parent/sibling w/ CABG, MI or angioplasty before 65F 55M? Not Asked   Social History Narrative     Not on file     Social Determinants of Health     Financial Resource Strain: Not on file   Food Insecurity: Not on file   Transportation Needs: Not on file   Physical Activity: Not on file   Stress: Not on file   Social Connections: Not on file   Intimate Partner Violence: Not on file   Housing Stability: Not on file       Allergies:  No Known Allergies    Medications:  Current Outpatient Medications   Medication Sig     bumetanide (BUMEX) 1 MG tablet      Calcium Carbonate-Vitamin D 500-3.125 MG-MCG TABS Take 1 tablet by mouth 2 times daily     diltiazem ER (DILT-XR) 120 MG 24 hr capsule Take  120 mg by mouth     folic acid (FOLVITE) 1 MG tablet TAKE FIVE TABLETS BY MOUTH EVERY DAY     lactulose (CHRONULAC) 10 GM/15ML solution TAKE 45 ML BY MOUTH THREE TIMES DAILY     pantoprazole (PROTONIX) 40 MG EC tablet Take 40 mg by mouth daily     potassium chloride ER (KLOR-CON M) 20 MEQ CR tablet Take 20 mEq by mouth 2 times daily     predniSONE (DELTASONE) 5 MG tablet Take 15 mg by mouth     XIFAXAN 550 MG TABS tablet      Zinc Sulfate 220 (50 Zn) MG TABS Take 220 mg by mouth     No current facility-administered medications for this visit.       Vitals:  Wt 102.1 kg (225 lb)   BMI 35.24 kg/m      Exam:  GENERAL APPEARANCE: alert and no distress  HENT: no obvious abnormalities on appearance  RESP: breathing appears unremarkable with normal rate, no audible wheezing or cough and no apparent shortness of breath with conversation  MS: extremities normal - no gross deformities noted  SKIN: no apparent rash and normal skin tone  NEURO: speech is clear with no obvious neurological deficits  PSYCH: mentation appears normal and affect normal    Results:   No results found for this or any previous visit (from the past 336 hour(s)).  Review of prior external note(s) from - [facility] from [facility]  reviewed  I spent a total of 107 minutes on the day of the visit.   Time spent doing chart review, history and exam, documentation and further activities per the note        Again, thank you for allowing me to participate in the care of your patient.        Sincerely,        No name on file

## 2022-11-29 NOTE — PROGRESS NOTES
Chief Complaint   Patient presents with     Transplant Evaluation     Liver /kidney     Weight 102.1 kg (225 lb).    Damion is a 67 year old who is being evaluated via a billable video visit.      How would you like to obtain your AVS? [email or web address]  If the video visit is dropped, the invitation should be resent by: Other e-mail: use [employer]  Will anyone else be joining your video visit? No  {If patient encounters technical issues they should call [phone] :[id number]}      Video-Visit Details    Video Start Time: 9:45 am     Type of service:  Video Visit    Video End Time:10:10 am     Originating Location (pt. Location): Home    {PROVIDER LOCATION On-site should be selected for visits conducted from your clinic location or adjoining [facility] hospital, academic office, or other nearby [facility] building. Off-site should be selected for all other provider locations, including home:[id number]}    Distant Location (provider location):  Off-site    Platform used for Video Visit: [employer]

## 2022-11-29 NOTE — PROGRESS NOTES
"Damion is a 67 year old who is being evaluated via a billable video visit.      How would you like to obtain your AVS? MyChart  If the video visit is dropped, the invitation should be resent by: Send to e-mail at: [email or web address]  Will anyone else be joining your video visit? No        Video-Visit Details    Video Start Time:  9:55 am     Type of service:  Video Visit    Video End Time: 10:13 am     Originating Location (pt. Location): Home        Distant Location (provider location):  Off-site    Platform used for Video Visit: [facility]    TRANSPLANT NEPHROLOGY RECIPIENT EVALUATION NOTE    Assessment and Plan:  # Liver/Kidney Transplant Evaluation: Patient is a good candidate overall. Benefits of a living donor transplant were discussed.    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 When ready he would likely benefit from a kidney transplant. He does meet UNOS requirements for kidney transplant. If approved for liver, approved for kidney.     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     +MPO antibody      On pred taper,  down to 10 mg daily.     # Psoriatic arthritis: since age 32. Used Humira for many years, off since 9/2022 with declining kidney function. Sxs stable. Followed by outside rheum.      # End stage liver disease due to ETOH and hereditary hemochromatosis:  in eval for liver tx.     # ETOH: reportedly sober since 2016.     # Cardiac Risk: PAF, not on AC d/t h/o GI bleeding. Needs risk assessment.     # PAD Screening: per surgery.     # BMI 35.6     # Health Maintenance: Colonoscopy: Up to date and Dental: Up to date    Discussed the risks and benefits of a transplant, " "including the risk of surgery and immunosuppression medications.  Patient's overall evaluation will be discussed in the Transplant Program's regular meeting with a final recommendation on the patients suitability for transplant to be made at that time.    Pending completion of the full evaluation, patient presently appears to be enough of an acceptable kidney transplant recipient candidate to have any potential kidney donors start the evaluation process.    Transplant Nephrology Review:  Patient's medical record was reviewed, including this note by the [facility].  Patient meets UNOS criteria for a simultaneous liver and kidney transplant and would be a kidney transplant candidate should he receive a liver transplant.    Jeovany Scott MD    Evaluation:  Damion Quinones was seen in consultation at the request of Dr. Dc for evaluation as a potential liver/kidney transplant recipient.    Reason for Visit:  Damion Quinones is a 67 year old male with CKD from unclear etiology, who presents for liver/kidney transplant evaluation.    History of Present Illness:  Damion Quinones is a 67-year-old being evaluated for SLK with history of alcohol cirhosis and kidney disease from unclear etiology. Psoriatic arthritis since age 32. H/o CKD since 2017.  In 7/2018 he presented with fairly extensive plaques throughout the upper and lower extremities along with active inflammatory arthritis . At that point was started on Humira with complete response. In 2020 baseline creatinine was 1.5-1.6->2.2-2.4 in 2021 -> 3.15 in August 2022 ->4.88 in September 2022. Further workup notable for a positive MPO antibody. Urinalysis with mild proteinuria, without significant hematuria. 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 He was started on prednisone 80 mg daily on 9/16 (now down to 10 mg daily, with plans to taper off). Needed one round of dialysis in Sept 2022 with TIPs. Most recent GFR was 14.             Kidney Disease Hx:               Kidney Disease Dx: unclear etiology        Biopsy Proven: Yes; as above             On Dialysis: No       Primary Nephrologist: Dr. Kaufman        H/o Kidney Stones: No       H/o Recurrent/Frequent UTI: No         Diabetic Hx: None           Cardiac/Vascular Disease Risk Factors:        Cardiac Risk Factors: Hypertension, CKD and Age (Male > 55, Female > 65)       Known CAD: No       Known PAD/Caludication Symptoms: No       Known Heart Failure: No       Arrhythmia: Yes; PAF, not on AC d/t h/o GI bleeding.        Pulmonary Hypertension: No       Valvular Disease: No       Other: None         Viral Serology Status       CMV IgG Antibody: Unknown       EBV IgG Antibody: Unknown         Volume Status/Weight:        Volume status: Not assessed       Weight:  BMI above guidelines       BMI: Body mass index is 35.24 kg/m .         Functional Capacity/Frailty:      Retired in 2019. Walks down long driveway to mailbox. Limited to walking 400 feet d/t fatigue.  Stayed active with hunting/fishing in prior years.      Fatigue/Decreased Energy: [x] No [] Yes    Chest Pain or SOB with Exertion: [x] No [] Yes    Significant Weight Change: [x] No [] Yes    Nausea, Vomiting or Diarrhea: [x] No [] Yes    Fever, Sweats or Chills:  [x] No [] Yes    Leg Swelling [x] No [] Yes        History of Cancer: None      COVID Vaccination Up To Date: Yes    Potential Living Kidney Donors: Yes     Review of Systems:  A comprehensive review of systems was obtained and negative, except as noted in the HPI or  PMH.    Past Medical History:   Medical record was reviewed and PMH was discussed with patient and noted below.  Past Medical History:   Diagnosis Date    Alcoholic cirrhosis of liver with ascites (H) 10/11/2019    Arthritis     RA    Chronic kidney disease     Coronary artery disease     AFib 2016    Diabetes mellitus type 2, uncomplicated (H) 10/11/2019    History of blood transfusion     2016    History of hemochromatosis 10/11/2019    Hypertension     Hypokalemia     Obesity     PAF (paroxysmal atrial fibrillation) (H)     Psoriatic arthritis (H)        Past Social History:   Past Surgical History:   Procedure Laterality Date    APPENDECTOMY      Removed at 18 Years Old     COLONOSCOPY      2014 at [facility].     EYE SURGERY Bilateral     Cataract    GI SURGERY      TIPS    HERNIA REPAIR      History of bilateral inguinal hernia repair: 10/28/2014. Open hernia repair: 10/2017. Abdominal wound exploration and debridement 12/27/2017     Personal history of bleeding or anesthesia problems: No    Family History:  Family History   Problem Relation Age of Onset    Lung Cancer Mother     Colon Cancer Father     Lung Cancer Brother     Heart Failure Brother     Kidney Disease Brother        Personal History:   Social History     Socioeconomic History    Marital status:      Spouse name: Not on file    Number of children: Not on file    Years of education: Not on file    Highest education level: Not on file   Occupational History    Not on file   Tobacco Use    Smoking status: Never    Smokeless tobacco: Never   Substance and Sexual Activity    Alcohol use: Not Currently     Comment: Last ETOH use was 12/31/2021    Drug use: Not Currently    Sexual activity: Not on file   Other Topics Concern    Parent/sibling w/ CABG, MI or angioplasty before 65F 55M? Not Asked   Social History Narrative    Not on file     Social Determinants of Health     Financial Resource Strain: Not on file   Food Insecurity: Not on file    Transportation Needs: Not on file   Physical Activity: Not on file   Stress: Not on file   Social Connections: Not on file   Intimate Partner Violence: Not on file   Housing Stability: Not on file       Allergies:  No Known Allergies    Medications:  Current Outpatient Medications   Medication Sig    bumetanide (BUMEX) 1 MG tablet     Calcium Carbonate-Vitamin D 500-3.125 MG-MCG TABS Take 1 tablet by mouth 2 times daily    diltiazem ER (DILT-XR) 120 MG 24 hr capsule Take 120 mg by mouth    folic acid (FOLVITE) 1 MG tablet TAKE FIVE TABLETS BY MOUTH EVERY DAY    lactulose (CHRONULAC) 10 GM/15ML solution TAKE 45 ML BY MOUTH THREE TIMES DAILY    pantoprazole (PROTONIX) 40 MG EC tablet Take 40 mg by mouth daily    potassium chloride ER (KLOR-CON M) 20 MEQ CR tablet Take 20 mEq by mouth 2 times daily    predniSONE (DELTASONE) 5 MG tablet Take 15 mg by mouth    XIFAXAN 550 MG TABS tablet     Zinc Sulfate 220 (50 Zn) MG TABS Take 220 mg by mouth     No current facility-administered medications for this visit.       Vitals:  Wt 102.1 kg (225 lb)   BMI 35.24 kg/m      Exam:  GENERAL APPEARANCE: alert and no distress  HENT: no obvious abnormalities on appearance  RESP: breathing appears unremarkable with normal rate, no audible wheezing or cough and no apparent shortness of breath with conversation  MS: extremities normal - no gross deformities noted  SKIN: no apparent rash and normal skin tone  NEURO: speech is clear with no obvious neurological deficits  PSYCH: mentation appears normal and affect normal    Results:   No results found for this or any previous visit (from the past 336 hour(s)).  Review of prior external note(s) from - [facility] information from [facility]  reviewed  I spent a total of 107 minutes on the day of the visit.   Time spent doing chart review, history and exam, documentation and further activities per the note

## 2022-12-07 PROBLEM — N18.9 CHRONIC KIDNEY DISEASE: Status: ACTIVE | Noted: 2022-12-07

## 2022-12-07 PROBLEM — E66.9 OBESITY: Status: ACTIVE | Noted: 2022-11-23

## 2022-12-07 PROBLEM — E87.6 HYPOKALEMIA: Status: ACTIVE | Noted: 2022-12-07

## 2022-12-07 PROBLEM — E66.01 MORBID OBESITY (H): Status: ACTIVE | Noted: 2022-12-07

## 2022-12-16 ENCOUNTER — TELEPHONE (OUTPATIENT)
Dept: TRANSPLANT | Facility: CLINIC | Age: 67
End: 2022-12-16

## 2022-12-16 ENCOUNTER — HOSPITAL ENCOUNTER (INPATIENT)
Facility: CLINIC | Age: 67
LOS: 5 days | Discharge: HOME | End: 2022-12-21
Attending: FAMILY MEDICINE | Admitting: STUDENT IN AN ORGANIZED HEALTH CARE EDUCATION/TRAINING PROGRAM
Payer: COMMERCIAL

## 2022-12-16 ENCOUNTER — APPOINTMENT (OUTPATIENT)
Dept: ULTRASOUND IMAGING | Facility: CLINIC | Age: 67
End: 2022-12-16
Attending: FAMILY MEDICINE
Payer: COMMERCIAL

## 2022-12-16 DIAGNOSIS — N18.9 ACUTE KIDNEY INJURY SUPERIMPOSED ON CHRONIC KIDNEY DISEASE (H): ICD-10-CM

## 2022-12-16 DIAGNOSIS — R19.7 DIARRHEA, UNSPECIFIED TYPE: ICD-10-CM

## 2022-12-16 DIAGNOSIS — E87.5 HYPERKALEMIA: ICD-10-CM

## 2022-12-16 DIAGNOSIS — Z86.39 HISTORY OF HEMOCHROMATOSIS: ICD-10-CM

## 2022-12-16 DIAGNOSIS — K70.31 ALCOHOLIC CIRRHOSIS OF LIVER WITH ASCITES (H): ICD-10-CM

## 2022-12-16 DIAGNOSIS — A04.72 C. DIFFICILE COLITIS: ICD-10-CM

## 2022-12-16 DIAGNOSIS — N17.9 ACUTE KIDNEY INJURY SUPERIMPOSED ON CHRONIC KIDNEY DISEASE (H): ICD-10-CM

## 2022-12-16 DIAGNOSIS — M19.041 ARTHRITIS OF FINGER OF RIGHT HAND: Primary | ICD-10-CM

## 2022-12-16 DIAGNOSIS — I48.20 CHRONIC ATRIAL FIBRILLATION (H): ICD-10-CM

## 2022-12-16 LAB
ABO/RH(D): NORMAL
ALBUMIN SERPL BCG-MCNC: 3.2 G/DL (ref 3.5–5.2)
ALBUMIN UR-MCNC: NEGATIVE MG/DL
ALP SERPL-CCNC: 227 U/L (ref 40–129)
ALT SERPL W P-5'-P-CCNC: 34 U/L (ref 10–50)
AMMONIA PLAS-SCNC: 95 UMOL/L (ref 16–60)
ANION GAP SERPL CALCULATED.3IONS-SCNC: 17 MMOL/L (ref 7–15)
ANTIBODY SCREEN: NEGATIVE
APPEARANCE UR: CLEAR
APTT PPP: 27 SECONDS (ref 22–38)
AST SERPL W P-5'-P-CCNC: 47 U/L (ref 10–50)
BASOPHILS # BLD AUTO: 0.1 10E3/UL (ref 0–0.2)
BASOPHILS NFR BLD AUTO: 0 %
BILIRUB SERPL-MCNC: 0.9 MG/DL
BILIRUB UR QL STRIP: NEGATIVE
BUN SERPL-MCNC: 104 MG/DL (ref 8–23)
C DIFF GDH STL QL IA: POSITIVE
C DIFF TOX A+B STL QL IA: POSITIVE
C DIFF TOX B STL QL: POSITIVE
CALCIUM SERPL-MCNC: 9 MG/DL (ref 8.8–10.2)
CHLORIDE SERPL-SCNC: 94 MMOL/L (ref 98–107)
COLOR UR AUTO: ABNORMAL
CREAT SERPL-MCNC: 6.5 MG/DL (ref 0.67–1.17)
CREAT UR-MCNC: 53.6 MG/DL
CRP SERPL-MCNC: 77.7 MG/L
DEPRECATED HCO3 PLAS-SCNC: 19 MMOL/L (ref 22–29)
EOSINOPHIL # BLD AUTO: 0.2 10E3/UL (ref 0–0.7)
EOSINOPHIL NFR BLD AUTO: 1 %
ERYTHROCYTE [DISTWIDTH] IN BLOOD BY AUTOMATED COUNT: 14.6 % (ref 10–15)
FRACT EXCRET NA UR+SERPL-RTO: NORMAL %
GFR SERPL CREATININE-BSD FRML MDRD: 9 ML/MIN/1.73M2
GLUCOSE SERPL-MCNC: 109 MG/DL (ref 70–99)
GLUCOSE UR STRIP-MCNC: NEGATIVE MG/DL
HCT VFR BLD AUTO: 37.2 % (ref 40–53)
HGB BLD-MCNC: 12.2 G/DL (ref 13.3–17.7)
HGB UR QL STRIP: NEGATIVE
IMM GRANULOCYTES # BLD: 0.3 10E3/UL
IMM GRANULOCYTES NFR BLD: 2 %
INR PPP: 1.2 (ref 0.85–1.15)
KETONES UR STRIP-MCNC: NEGATIVE MG/DL
LACTATE SERPL-SCNC: 1.7 MMOL/L (ref 0.7–2)
LEUKOCYTE ESTERASE UR QL STRIP: NEGATIVE
LIPASE SERPL-CCNC: 85 U/L (ref 13–60)
LYMPHOCYTES # BLD AUTO: 1.3 10E3/UL (ref 0.8–5.3)
LYMPHOCYTES NFR BLD AUTO: 7 %
MAGNESIUM SERPL-MCNC: 2.3 MG/DL (ref 1.7–2.3)
MCH RBC QN AUTO: 29.5 PG (ref 26.5–33)
MCHC RBC AUTO-ENTMCNC: 32.8 G/DL (ref 31.5–36.5)
MCV RBC AUTO: 90 FL (ref 78–100)
MONOCYTES # BLD AUTO: 2.4 10E3/UL (ref 0–1.3)
MONOCYTES NFR BLD AUTO: 13 %
MUCOUS THREADS #/AREA URNS LPF: PRESENT /LPF
NEUTROPHILS # BLD AUTO: 13.9 10E3/UL (ref 1.6–8.3)
NEUTROPHILS NFR BLD AUTO: 77 %
NITRATE UR QL: NEGATIVE
NRBC # BLD AUTO: 0 10E3/UL
NRBC BLD AUTO-RTO: 0 /100
PH UR STRIP: 5.5 [PH] (ref 5–7)
PHOSPHATE SERPL-MCNC: 6.3 MG/DL (ref 2.5–4.5)
PLATELET # BLD AUTO: 215 10E3/UL (ref 150–450)
POTASSIUM SERPL-SCNC: 5.7 MMOL/L (ref 3.4–5.3)
PROT SERPL-MCNC: 6.2 G/DL (ref 6.4–8.3)
RBC # BLD AUTO: 4.14 10E6/UL (ref 4.4–5.9)
RBC URINE: <1 /HPF
SARS-COV-2 RNA RESP QL NAA+PROBE: NEGATIVE
SODIUM SERPL-SCNC: 130 MMOL/L (ref 136–145)
SODIUM UR-SCNC: <20 MMOL/L
SP GR UR STRIP: 1.01 (ref 1–1.03)
SPECIMEN EXPIRATION DATE: NORMAL
TSH SERPL DL<=0.005 MIU/L-ACNC: 2.86 UIU/ML (ref 0.3–4.2)
UROBILINOGEN UR STRIP-MCNC: NORMAL MG/DL
WBC # BLD AUTO: 18.2 10E3/UL (ref 4–11)
WBC URINE: <1 /HPF

## 2022-12-16 PROCEDURE — 93010 ELECTROCARDIOGRAM REPORT: CPT | Performed by: FAMILY MEDICINE

## 2022-12-16 PROCEDURE — 86901 BLOOD TYPING SEROLOGIC RH(D): CPT | Performed by: FAMILY MEDICINE

## 2022-12-16 PROCEDURE — 99223 1ST HOSP IP/OBS HIGH 75: CPT | Performed by: STUDENT IN AN ORGANIZED HEALTH CARE EDUCATION/TRAINING PROGRAM

## 2022-12-16 PROCEDURE — 83605 ASSAY OF LACTIC ACID: CPT | Performed by: FAMILY MEDICINE

## 2022-12-16 PROCEDURE — 87324 CLOSTRIDIUM AG IA: CPT | Performed by: FAMILY MEDICINE

## 2022-12-16 PROCEDURE — 81001 URINALYSIS AUTO W/SCOPE: CPT | Performed by: FAMILY MEDICINE

## 2022-12-16 PROCEDURE — 85730 THROMBOPLASTIN TIME PARTIAL: CPT | Performed by: FAMILY MEDICINE

## 2022-12-16 PROCEDURE — 82140 ASSAY OF AMMONIA: CPT | Performed by: FAMILY MEDICINE

## 2022-12-16 PROCEDURE — 87040 BLOOD CULTURE FOR BACTERIA: CPT | Performed by: FAMILY MEDICINE

## 2022-12-16 PROCEDURE — 76770 US EXAM ABDO BACK WALL COMP: CPT | Mod: 26 | Performed by: RADIOLOGY

## 2022-12-16 PROCEDURE — U0005 INFEC AGEN DETEC AMPLI PROBE: HCPCS | Performed by: NURSE PRACTITIONER

## 2022-12-16 PROCEDURE — 87506 IADNA-DNA/RNA PROBE TQ 6-11: CPT | Performed by: FAMILY MEDICINE

## 2022-12-16 PROCEDURE — 84300 ASSAY OF URINE SODIUM: CPT | Performed by: FAMILY MEDICINE

## 2022-12-16 PROCEDURE — 84443 ASSAY THYROID STIM HORMONE: CPT | Performed by: FAMILY MEDICINE

## 2022-12-16 PROCEDURE — 85025 COMPLETE CBC W/AUTO DIFF WBC: CPT | Performed by: FAMILY MEDICINE

## 2022-12-16 PROCEDURE — 76770 US EXAM ABDO BACK WALL COMP: CPT

## 2022-12-16 PROCEDURE — 250N000013 HC RX MED GY IP 250 OP 250 PS 637: Performed by: NURSE PRACTITIONER

## 2022-12-16 PROCEDURE — 120N000002 HC R&B MED SURG/OB UMMC

## 2022-12-16 PROCEDURE — 87493 C DIFF AMPLIFIED PROBE: CPT | Performed by: FAMILY MEDICINE

## 2022-12-16 PROCEDURE — 84100 ASSAY OF PHOSPHORUS: CPT | Performed by: FAMILY MEDICINE

## 2022-12-16 PROCEDURE — 80053 COMPREHEN METABOLIC PANEL: CPT | Performed by: FAMILY MEDICINE

## 2022-12-16 PROCEDURE — 258N000003 HC RX IP 258 OP 636: Performed by: NURSE PRACTITIONER

## 2022-12-16 PROCEDURE — 83735 ASSAY OF MAGNESIUM: CPT | Performed by: FAMILY MEDICINE

## 2022-12-16 PROCEDURE — 36415 COLL VENOUS BLD VENIPUNCTURE: CPT | Performed by: FAMILY MEDICINE

## 2022-12-16 PROCEDURE — 99285 EMERGENCY DEPT VISIT HI MDM: CPT | Mod: 25 | Performed by: FAMILY MEDICINE

## 2022-12-16 PROCEDURE — 86850 RBC ANTIBODY SCREEN: CPT | Performed by: FAMILY MEDICINE

## 2022-12-16 PROCEDURE — 250N000012 HC RX MED GY IP 250 OP 636 PS 637: Performed by: NURSE PRACTITIONER

## 2022-12-16 PROCEDURE — 83690 ASSAY OF LIPASE: CPT | Performed by: FAMILY MEDICINE

## 2022-12-16 PROCEDURE — 85610 PROTHROMBIN TIME: CPT | Performed by: FAMILY MEDICINE

## 2022-12-16 PROCEDURE — 86140 C-REACTIVE PROTEIN: CPT | Performed by: FAMILY MEDICINE

## 2022-12-16 RX ORDER — PANTOPRAZOLE SODIUM 40 MG/1
40 TABLET, DELAYED RELEASE ORAL DAILY
Status: DISCONTINUED | OUTPATIENT
Start: 2022-12-17 | End: 2022-12-21 | Stop reason: HOSPADM

## 2022-12-16 RX ORDER — VANCOMYCIN HYDROCHLORIDE 125 MG/1
125 CAPSULE ORAL 4 TIMES DAILY
Status: DISCONTINUED | OUTPATIENT
Start: 2022-12-16 | End: 2022-12-21 | Stop reason: HOSPADM

## 2022-12-16 RX ORDER — DILTIAZEM HYDROCHLORIDE 120 MG/1
120 CAPSULE, COATED, EXTENDED RELEASE ORAL DAILY
Status: DISCONTINUED | OUTPATIENT
Start: 2022-12-17 | End: 2022-12-21 | Stop reason: HOSPADM

## 2022-12-16 RX ORDER — PROCHLORPERAZINE 25 MG
12.5 SUPPOSITORY, RECTAL RECTAL EVERY 12 HOURS PRN
Status: DISCONTINUED | OUTPATIENT
Start: 2022-12-16 | End: 2022-12-21 | Stop reason: HOSPADM

## 2022-12-16 RX ORDER — ONDANSETRON 2 MG/ML
4 INJECTION INTRAMUSCULAR; INTRAVENOUS EVERY 6 HOURS PRN
Status: DISCONTINUED | OUTPATIENT
Start: 2022-12-16 | End: 2022-12-21 | Stop reason: HOSPADM

## 2022-12-16 RX ORDER — ACETAMINOPHEN 650 MG/1
650 SUPPOSITORY RECTAL EVERY 8 HOURS PRN
Status: DISCONTINUED | OUTPATIENT
Start: 2022-12-16 | End: 2022-12-21 | Stop reason: HOSPADM

## 2022-12-16 RX ORDER — ZINC SULFATE 50(220)MG
220 CAPSULE ORAL DAILY
Status: DISCONTINUED | OUTPATIENT
Start: 2022-12-17 | End: 2022-12-21 | Stop reason: HOSPADM

## 2022-12-16 RX ORDER — PREDNISONE 20 MG/1
40 TABLET ORAL DAILY
Status: DISCONTINUED | OUTPATIENT
Start: 2022-12-16 | End: 2022-12-21 | Stop reason: HOSPADM

## 2022-12-16 RX ORDER — LIDOCAINE 40 MG/G
CREAM TOPICAL
Status: DISCONTINUED | OUTPATIENT
Start: 2022-12-16 | End: 2022-12-21 | Stop reason: HOSPADM

## 2022-12-16 RX ORDER — ACETAMINOPHEN 325 MG/1
650 TABLET ORAL EVERY 8 HOURS PRN
Status: DISCONTINUED | OUTPATIENT
Start: 2022-12-16 | End: 2022-12-21 | Stop reason: HOSPADM

## 2022-12-16 RX ORDER — FOLIC ACID 1 MG/1
5 TABLET ORAL DAILY
Status: DISCONTINUED | OUTPATIENT
Start: 2022-12-17 | End: 2022-12-21 | Stop reason: HOSPADM

## 2022-12-16 RX ORDER — SPIRONOLACTONE 25 MG/1
25 TABLET ORAL 2 TIMES DAILY
COMMUNITY
End: 2023-01-15

## 2022-12-16 RX ORDER — ONDANSETRON 4 MG/1
4 TABLET, ORALLY DISINTEGRATING ORAL EVERY 6 HOURS PRN
Status: DISCONTINUED | OUTPATIENT
Start: 2022-12-16 | End: 2022-12-21 | Stop reason: HOSPADM

## 2022-12-16 RX ORDER — PROCHLORPERAZINE MALEATE 5 MG
5 TABLET ORAL EVERY 6 HOURS PRN
Status: DISCONTINUED | OUTPATIENT
Start: 2022-12-16 | End: 2022-12-21 | Stop reason: HOSPADM

## 2022-12-16 RX ADMIN — VANCOMYCIN HYDROCHLORIDE 125 MG: 125 CAPSULE ORAL at 22:47

## 2022-12-16 RX ADMIN — SODIUM CHLORIDE 1000 ML: 9 INJECTION, SOLUTION INTRAVENOUS at 20:12

## 2022-12-16 RX ADMIN — PREDNISONE 40 MG: 20 TABLET ORAL at 22:39

## 2022-12-16 ASSESSMENT — ENCOUNTER SYMPTOMS
FEVER: 0
AGITATION: 0
APPETITE CHANGE: 0
BLOOD IN STOOL: 0
ABDOMINAL PAIN: 1
DYSPHORIC MOOD: 0
ARTHRALGIAS: 0
WEAKNESS: 1
SHORTNESS OF BREATH: 0
CHILLS: 0
CONFUSION: 0
DECREASED CONCENTRATION: 0
WOUND: 0
DYSURIA: 0
NAUSEA: 0
FLANK PAIN: 1
BRUISES/BLEEDS EASILY: 1
PHOTOPHOBIA: 0
ACTIVITY CHANGE: 1
VOICE CHANGE: 0
DIARRHEA: 1
VOMITING: 0
TROUBLE SWALLOWING: 0
BACK PAIN: 0
HEADACHES: 0

## 2022-12-16 ASSESSMENT — ACTIVITIES OF DAILY LIVING (ADL)
ADLS_ACUITY_SCORE: 35

## 2022-12-16 NOTE — TELEPHONE ENCOUNTER
Received a call from patient's family member Apoorva and Judit from Dr. Kaufman, his local nephrologist's office.    Nephrology (local)  wants him admitted because of worsening kidney function and uremic symptoms.  They report in last 2 weeks BUN from 72 to 110, creatinine from 5.07 to 6.73.  Diarrhea, metallic taste in mouth, overall uremic appearance.  Patient has not yet been initiated on dialysis    Patient currently in evaluation for Liver-Kidney transplant, began eval as outpatient.  Patient will come via our ER for probable admission.  Nephrology office to also call SOC for provider to provider communication, number provided.      Remaining transplant evaluation:    -Thorough cardiac eval needed- was scheduled as outpatient next week    - Re-assessment of frailty    - dexa scan as outpatient    - needs review if covid booster done and other vax up to date

## 2022-12-16 NOTE — LETTER
Transition Communication Hand-off for Care Transitions to Next Level of Care Provider    Name: Damion Quinones  : 1955  MRN #: [id number]  Primary Care Provider: Physician No Ref-Primary     Primary Clinic: No address on file     Reason for Hospitalization:  Hyperkalemia [E87.5]  Alcoholic cirrhosis of liver with ascites (H) [K70.31]  History of hemochromatosis [Z86.39]  Acute kidney injury superimposed on chronic kidney disease (H) [N17.9, N18.9]  Diarrhea, unspecified type [R19.7]  Admit Date/Time: 2022  4:08 PM  Discharge Date: 2022  Payor Source: Payor: MEDICARE / Plan: MEDICARE / Product Type: Medicare /     Readmission Assessment Measure (ALTA) Risk Score/category: 23%       Reason for Communication Hand-off Referral: Avoidable readmission within 30 days    Discharge Plan: Pt is medically ready for discharge home today. A&O x4, on RA, ambulate ind.       Concern for non-adherence with plan of care:   Y/N No  Discharge Needs Assessment:  Needs    Flowsheet Row Most Recent Value   Equipment Currently Used at Home cane, quad          Already enrolled in Tele-monitoring program and name of program:  NA  Follow-up specialty is recommended: No    Follow-up plan:    Future Appointments   Date Time Provider Department Center   2022 12:30 PM [address]   2022 10:00 AM [address] Le[address] RD TXO [facility]   2022 10:30 AM [id number] UCCDEXA [facility]   2022 11:00 AM [id number] CVCV [facility]   2022  1:00 PM ELY Jimenez MD [facility]       Any outstanding tests or procedures:              Key Recommendations:      Alysha Johnson RN    AVS/Discharge Summary is the source of truth; this is a helpful guide for improved communication of patient story

## 2022-12-16 NOTE — ED PROVIDER NOTES
[place] EMERGENCY DEPARTMENT ([facility])    12/16/22       ED PROVIDER NOTE  VERTICAL TRIAGE C     History     Chief Complaint   Patient presents with     Abnormal Labs     HPI  Damion Quinones is a 67 year old male with history of decompensated alcoholic cirrhosis/hemochromatosis c/b hepatic encephalopathy and hemorrhagic gastric/esophageal varices s/p emergent TIPS procedure, alcohol withdrawal seizures, paroxysmal Afib (not on AC), CKD 4/5, psoriasis/psoriatic arthritis (on prednisone), HTN and ANCA associated vasculitis who was sent to the emergency department for further evaluation and management of  worsening kidney function found on outpatient labs. He had labwork done this morning at [facility] after follow-up appointment from his recent hospitalization for hepatic encephalopathy, labs were quite abnormal.  [facility] nephrology found that in the last 2 weeks his BUN jumped from 72 to 110, creatinine from 5.07 to 6.73.  He has been experiencing diarrhea, metallic taste in mouth, overall uremic appearance.  Patient has not yet been initiated on dialysis.  He is currently undergoing evaluation for liver kidney transplant. Here patient with his wife noted patient otherwise feels fine at this point.  Denies any change in urine pattern.  Notes some continued swelling etc.  Weight is up also.    Past Medical History  Past Medical History:   Diagnosis Date     Alcoholic cirrhosis of liver with ascites (H) 10/11/2019     Arthritis     RA     Chronic kidney disease      Coronary artery disease     AFib 2016     Diabetes mellitus type 2, uncomplicated (H) 10/11/2019     History of blood transfusion     2016     History of hemochromatosis 10/11/2019     Hypertension      Hypokalemia      Obesity      PAF (paroxysmal atrial fibrillation) (H)      Psoriatic arthritis (H)      Past Surgical History:   Procedure Laterality Date     APPENDECTOMY      Removed at 18 Years Old      COLONOSCOPY      2014 at  [facility].      EYE SURGERY Bilateral     Cataract     GI SURGERY      TIPS     HERNIA REPAIR      History of bilateral inguinal hernia repair: 10/28/2014. Open hernia repair: 10/2017. Abdominal wound exploration and debridement 12/27/2017     spironolactone (ALDACTONE) 25 MG tablet  bumetanide (BUMEX) 1 MG tablet  Calcium Carbonate-Vitamin D 500-3.125 MG-MCG TABS  diltiazem ER (DILT-XR) 120 MG 24 hr capsule  folic acid (FOLVITE) 1 MG tablet  lactulose (CHRONULAC) 10 GM/15ML solution  pantoprazole (PROTONIX) 40 MG EC tablet  predniSONE (DELTASONE) 5 MG tablet  XIFAXAN 550 MG TABS tablet  Zinc Sulfate 220 (50 Zn) MG TABS      No Known Allergies  Family History  Family History   Problem Relation Age of Onset     Lung Cancer Mother      Colon Cancer Father      Lung Cancer Brother      Heart Failure Brother      Kidney Disease Brother      Social History   Social History     Tobacco Use     Smoking status: Never     Smokeless tobacco: Never   Substance Use Topics     Alcohol use: Not Currently     Comment: Last ETOH use was 12/31/2021     Drug use: Not Currently      Past medical history, past surgical history, medications, allergies, family history, and social history were reviewed with the patient. No additional pertinent items.       Review of Systems   Constitutional: Positive for activity change. Negative for appetite change, chills and fever.   HENT: Negative for trouble swallowing and voice change.    Eyes: Negative for photophobia and visual disturbance.   Respiratory: Negative for shortness of breath.    Cardiovascular: Negative for chest pain.   Gastrointestinal: Positive for abdominal pain (mild) and diarrhea. Negative for blood in stool, nausea and vomiting.   Genitourinary: Positive for flank pain (left). Negative for dysuria.   Musculoskeletal: Negative for arthralgias and back pain.   Skin: Negative for rash and wound.   Allergic/Immunologic: Negative for immunocompromised state.   Neurological:  "Positive for weakness. Negative for headaches.   Hematological: Bruises/bleeds easily.   Psychiatric/Behavioral: Negative for agitation, confusion, decreased concentration and dysphoric mood.   All other systems reviewed and are negative.    A complete review of systems was performed with pertinent positives and negatives noted in the HPI, and all other systems negative.    Physical Exam   BP: 123/79  Pulse: 82  Temp: 98.5  F (36.9  C)  Resp: 20  Height: 172.7 cm (5' 8\")  Weight: 90.7 kg (200 lb)  SpO2: 96 %  Physical Exam  Vitals and nursing note reviewed.   Constitutional:       General: He is in acute distress.      Appearance: He is well-developed. He is not toxic-appearing or diaphoretic.      Comments: Patient nontoxic here with his wife ambulates well no marked respiratory distress is not confused does not appear encephalopathic vital signs are stable otherwise   HENT:      Head: Normocephalic and atraumatic.      Nose: Nose normal.      Mouth/Throat:      Mouth: Mucous membranes are moist.      Pharynx: Oropharynx is clear.   Eyes:      General: No scleral icterus.     Extraocular Movements: Extraocular movements intact.      Conjunctiva/sclera: Conjunctivae normal.      Pupils: Pupils are equal, round, and reactive to light.   Cardiovascular:      Rate and Rhythm: Normal rate and regular rhythm.   Pulmonary:      Effort: No respiratory distress.      Breath sounds: No stridor.   Abdominal:      General: There is distension.      Palpations: Abdomen is soft. There is no mass.      Tenderness: There is no abdominal tenderness.      Hernia: A hernia (soft umbilical hernia) is present.   Musculoskeletal:      Cervical back: Normal range of motion and neck supple.      Right lower leg: Edema present.      Left lower leg: Edema present.   Skin:     General: Skin is warm and dry.      Capillary Refill: Capillary refill takes less than 2 seconds.      Coloration: Skin is not jaundiced.      Findings: No bruising or " rash.   Neurological:      General: No focal deficit present.      Mental Status: He is alert and oriented to person, place, and time. Mental status is at baseline.   Psychiatric:      Comments: Appropriate here in the ER         ED Course         Reviewed labs drawn earlier today at [facility].  Patient's BUN over 100 creatinine is 6.7.  Potassium was 4.80.    In the ER we did establish an IV and draw labs at this point I talked to nephrology also with his acute on chronic kidney injury recommended admitting the patient we will check a fractionated sodium along with getting a renal ultrasound holding his Bumex and further work-up for his diarrhea at this point unclear whether or not this is caused by his liver disease or some other cause possible prerenal hypoalbuminemia etc.  Labs done reviewed sodium 130 potassium is 5.7.  Bicarb is 19 chloride is 94 gap is 17 BUN is 104 creatinine is 6.5.  Glucose is 109 alk phos 227.  Bilirubin is 0.9.  Phosphorus is 6.3.  Lipase 85.    White count is 18.2 hemoglobin 12.2.  Platelets noted to be 215.  Ammonia pending at this point C. difficile along with enteric pathogens were ordered.     with potassium is 5.7 also check an EKG at this point.  Planning to admit to medicine for further work-up etc.  As noted renal ultrasound is pending at this point also.    Patient also an EKG done here in the ER findings revealed A. fib rate controlled.  Patient discussing with him that he has chronic A. fib he elects not to be on anticoagulants he has been on a baby aspirin for years although is not currently taking one right now.    Discussed with medicine will accept the patient for further work-up.    Procedures            EKG Interpretation:      Interpreted by Jose Burton MD  Time reviewed: 1827  Symptoms at time of EKG: hyperkalemia   Rhythm: a fib  Rate: normal  Axis: normal  Ectopy: none  Conduction: normal  ST Segments/ T Waves: Nonspecific ST-T wave changes  Q Waves:  none  Comparison to prior: Unchanged    Clinical Impression: chronic a fib                    Results for orders placed or performed during the hospital encounter of 12/16/22   US Renal Complete Non-Vascular     Status: None    Narrative    EXAMINATION: US RENAL COMPLETE NON-VASCULAR, 12/16/2022 6:05 PM     COMPARISON: Ultrasound 11/22/2022    HISTORY: PATI on CKD evaluation    TECHNIQUE: The kidneys and bladder were scanned in the standard  fashion with specialized ultrasound transducer(s) using both gray  scale and limited color/spectral Doppler techniques.    FINDINGS:    Right kidney: Measures 9.3 cm in length. Parenchyma is of normal  echogenicity. No focal mass. No hydronephrosis.    Left kidney: Measures 9.5 cm in length. Parenchyma is of normal  echogenicity. No focal mass. No hydronephrosis. Decreased size of the  hypoechoic subcapsular collection measuring 6.2 x 4.6 x 3.1 cm,  previously 7.5 x 4.9 x 2.6 cm.    Bladder: Moderately distended and within normal limits.      Impression    IMPRESSION:  1.  Decreased size of the left renal subcapsular collection,  presumably resolving subcapsular hematoma from prior renal biopsy.  2.  No hydronephrosis.    I have personally reviewed the examination and initial interpretation  and I agree with the findings.    MARK ALVAREZ MD         SYSTEM ID:  [id number]   CRP inflammation     Status: Abnormal   Result Value Ref Range    CRP Inflammation 77.70 (H) <5.00 mg/L   INR     Status: Abnormal   Result Value Ref Range    INR 1.20 (H) 0.85 - 1.15   Partial thromboplastin time     Status: Normal   Result Value Ref Range    aPTT 27 22 - 38 Seconds   Comprehensive metabolic panel     Status: Abnormal   Result Value Ref Range    Sodium 130 (L) 136 - 145 mmol/L    Potassium 5.7 (H) 3.4 - 5.3 mmol/L    Chloride 94 (L) 98 - 107 mmol/L    Carbon Dioxide (CO2) 19 (L) 22 - 29 mmol/L    Anion Gap 17 (H) 7 - 15 mmol/L    Urea Nitrogen 104.0 (H) 8.0 - 23.0 mg/dL    Creatinine 6.50  (H) 0.67 - 1.17 mg/dL    Calcium 9.0 8.8 - 10.2 mg/dL    Glucose 109 (H) 70 - 99 mg/dL    Alkaline Phosphatase 227 (H) 40 - 129 U/L    AST 47 10 - 50 U/L    ALT 34 10 - 50 U/L    Protein Total 6.2 (L) 6.4 - 8.3 g/dL    Albumin 3.2 (L) 3.5 - 5.2 g/dL    Bilirubin Total 0.9 <=1.2 mg/dL    GFR Estimate 9 (L) >60 mL/min/1.73m2   Magnesium     Status: Normal   Result Value Ref Range    Magnesium 2.3 1.7 - 2.3 mg/dL   Phosphorus     Status: Abnormal   Result Value Ref Range    Phosphorus 6.3 (H) 2.5 - 4.5 mg/dL   Lipase     Status: Abnormal   Result Value Ref Range    Lipase 85 (H) 13 - 60 U/L   Lactic acid whole blood     Status: Normal   Result Value Ref Range    Lactic Acid 1.7 0.7 - 2.0 mmol/L   TSH     Status: Normal   Result Value Ref Range    TSH 2.86 0.30 - 4.20 uIU/mL   UA with Microscopic reflex to Culture     Status: Abnormal    Specimen: Urine, Midstream   Result Value Ref Range    Color Urine Light Yellow Colorless, Straw, Light Yellow, Yellow    Appearance Urine Clear Clear    Glucose Urine Negative Negative mg/dL    Bilirubin Urine Negative Negative    Ketones Urine Negative Negative mg/dL    Specific Gravity Urine 1.010 1.003 - 1.035    Blood Urine Negative Negative    pH Urine 5.5 5.0 - 7.0    Protein Albumin Urine Negative Negative mg/dL    Urobilinogen Urine Normal Normal, 2.0 mg/dL    Nitrite Urine Negative Negative    Leukocyte Esterase Urine Negative Negative    Mucus Urine Present (A) None Seen /LPF    RBC Urine <1 <=2 /HPF    WBC Urine <1 <=5 /HPF    Narrative    Urine Culture not indicated   Ammonia (on ice)     Status: Abnormal   Result Value Ref Range    Ammonia 95 (H) 16 - 60 umol/L   CBC with platelets and differential     Status: Abnormal   Result Value Ref Range    WBC Count 18.2 (H) 4.0 - 11.0 10e3/uL    RBC Count 4.14 (L) 4.40 - 5.90 10e6/uL    Hemoglobin 12.2 (L) 13.3 - 17.7 g/dL    Hematocrit 37.2 (L) 40.0 - 53.0 %    MCV 90 78 - 100 fL    MCH 29.5 26.5 - 33.0 pg    MCHC 32.8 31.5 - 36.5  g/dL    RDW 14.6 10.0 - 15.0 %    Platelet Count 215 150 - 450 10e3/uL    % Neutrophils 77 %    % Lymphocytes 7 %    % Monocytes 13 %    % Eosinophils 1 %    % Basophils 0 %    % Immature Granulocytes 2 %    NRBCs per 100 WBC 0 <1 /100    Absolute Neutrophils 13.9 (H) 1.6 - 8.3 10e3/uL    Absolute Lymphocytes 1.3 0.8 - 5.3 10e3/uL    Absolute Monocytes 2.4 (H) 0.0 - 1.3 10e3/uL    Absolute Eosinophils 0.2 0.0 - 0.7 10e3/uL    Absolute Basophils 0.1 0.0 - 0.2 10e3/uL    Absolute Immature Granulocytes 0.3 <=0.4 10e3/uL    Absolute NRBCs 0.0 10e3/uL   Fractional Excretion of Sodium     Status: None   Result Value Ref Range    Creatinine Urine mg/dL 53.6 mg/dL    Sodium Urine mmol/L <20 mmol/L    %FENA     Asymptomatic COVID-19 Virus (Coronavirus) by PCR Nasopharyngeal     Status: Normal    Specimen: Nasopharyngeal; Swab   Result Value Ref Range    SARS CoV2 PCR Negative Negative    Narrative    Testing was performed using the Xpert Xpress SARS-CoV-2 Assay on the Cepheid Gene-Xpert Instrument Systems. Additional information about this Emergency Use Authorization (EUA) assay can be found via the Lab Guide. This test should be ordered for the detection of SARS-CoV-2 in individuals who meet SARS-CoV-2 clinical and/or epidemiological criteria as well as from individuals without symptoms or other reasons to suspect COVID-19. Test performance for asymptomatic patients has only been established in anterior nasal swab specimens. This test is for in vitro diagnostic use under the FDA EUA for laboratories certified under CLIA to perform high complexity testing. This test has not been FDA cleared or approved. A negative result does not rule out the presence of PCR inhibitors in the specimen or target RNA concentration below the limit of detection for the assay. The possibility of a false negative should be considered if the patient's recent exposure or clinical presentation suggests COVID-19. This test was validated by [employer]  [place] [address] [facility] [employer]. These Laboratories are certified under the Clinical Laboratory Improvement Amendments (CLIA) as qualified to perform high complexity testing.     EKG 12 lead     Status: None (Preliminary result)   Result Value Ref Range    Systolic Blood Pressure  mmHg    Diastolic Blood Pressure  mmHg    Ventricular Rate 71 BPM    Atrial Rate 75 BPM    SD Interval  ms    QRS Duration 76 ms     ms    QTc 402 ms    P Axis  degrees    R AXIS 39 degrees    T Axis 18 degrees    Interpretation ECG Atrial fibrillation  Abnormal ECG      Adult Type and Screen     Status: None   Result Value Ref Range    ABO/RH(D) O POS     Antibody Screen Negative Negative    SPECIMEN EXPIRATION DATE [id number]    C. difficile Toxin B PCR with reflex to C. difficile Antigen and Toxins A/B EIA     Status: Abnormal    Specimen: Per Rectum; Stool   Result Value Ref Range    C Difficile Toxin B by PCR Positive (A) Negative    Narrative    The [employer] Xpert C. difficile Assay, performed on the [employer]  Instrument Systems, is a qualitative in vitro diagnostic test for rapid detection of toxin B gene sequences from unformed (liquid or soft) stool specimens collected from patients suspected of having Clostridioides difficile infection (CDI). The test utilizes automated real-time polymerase chain reaction (PCR) to detect toxin gene sequences associated with toxin producing C. difficile. The Xpert C. difficile Assay is intended as an aid in the diagnosis of CDI.   C. difficile Antigen and Toxins A/B by Enzyme Immunoassay     Status: Abnormal    Specimen: Per Rectum; Stool   Result Value Ref Range    C. difficile GDH Antigen Positive (A) Negative    C. difficile Toxin Positive (A) Negative    Narrative    C. difficile GDH antigen and C. difficile toxin were detected by enzyme immunoassay. Results must be interpreted based on clinical findings and are supportive of C. difficile infection.   CBC with platelets differential      Status: Abnormal    Narrative    The following orders were created for panel order CBC with platelets differential.  Procedure                               Abnormality         Status                     ---------                               -----------         ------                     CBC with platelets and d...[[id number]]  Abnormal            Final result                 Please view results for these tests on the individual orders.   ABO/Rh type and screen     Status: None    Narrative    The following orders were created for panel order ABO/Rh type and screen.  Procedure                               Abnormality         Status                     ---------                               -----------         ------                     Adult Type and Screen[[id number]]                            Final result                 Please view results for these tests on the individual orders.   Fractional excretion of sodium     Status: None    Narrative    The following orders were created for panel order Fractional excretion of sodium.  Procedure                               Abnormality         Status                     ---------                               -----------         ------                     Fractional Excretion of ...[[id number]]                      Final result                 Please view results for these tests on the individual orders.     Medications   lidocaine 1 % 0.1-1 mL (has no administration in time range)   lidocaine (LMX4) cream (has no administration in time range)   sodium chloride (PF) 0.9% PF flush 3 mL (3 mLs Intracatheter Given 12/16/22 2011)   sodium chloride (PF) 0.9% PF flush 3 mL (has no administration in time range)   predniSONE (DELTASONE) tablet 40 mg (40 mg Oral Given 12/16/22 2239)   Calcium Carbonate-Vitamin D 500-3.125 MG-MCG TABS 1 tablet (has no administration in time range)   diltiazem ER (DILT-XR) 24 hr capsule 120 mg (has no administration in time range)   folic acid  (FOLVITE) tablet 5 mg (has no administration in time range)   pantoprazole (PROTONIX) EC tablet 40 mg (has no administration in time range)   rifaximin (XIFAXAN) tablet 550 mg (has no administration in time range)   Zinc Sulfate TABS 220 mg (has no administration in time range)   lidocaine 1 % 0.1-1 mL (has no administration in time range)   lidocaine (LMX4) cream (has no administration in time range)   sodium chloride (PF) 0.9% PF flush 3 mL (has no administration in time range)   sodium chloride (PF) 0.9% PF flush 3 mL (has no administration in time range)   melatonin tablet 1 mg (has no administration in time range)   acetaminophen (TYLENOL) tablet 650 mg (has no administration in time range)     Or   acetaminophen (TYLENOL) Suppository 650 mg (has no administration in time range)   ondansetron (ZOFRAN ODT) ODT tab 4 mg (has no administration in time range)     Or   ondansetron (ZOFRAN) injection 4 mg (has no administration in time range)   prochlorperazine (COMPAZINE) injection 5 mg (has no administration in time range)     Or   prochlorperazine (COMPAZINE) tablet 5 mg (has no administration in time range)     Or   prochlorperazine (COMPAZINE) suppository 12.5 mg (has no administration in time range)   vancomycin (VANCOCIN) capsule 125 mg (125 mg Oral Given 12/16/22 2247)   0.9% sodium chloride BOLUS (0 mLs Intravenous Stopped 12/16/22 2215)        Assessments & Plan (with Medical Decision Making)  Is a 67-year-old male history of cirrhotic alcoholic liver disease and chronic kidney disease presented ER recommendation for admission.  Patient been followed [facility] but is on the transplant work-up list.  Patient has had diarrhea for the last 3 days with some left flank pain but no blood in the stool no recent antibiotics etc.  No history of C. difficile or from her bowel.  No nausea vomiting.  Patient notes that apparently his creatinine is over 6-1/2 now and his BUN is over 100.  He presented here to the  ER otherwise feeling fairly well he has been taking lactulose today because he has the ongoing diarrhea multiple stools.  Patient here in the ER as not confused here with wife.  His work-up included creatinine over 6.5 BUN is 104.  Ammonia is 95 potassium 5.7 EKG shows chronic A. fib with rate controlled.  COVID testing negative.  White count was 18.2 hemoglobin 12.2.  Platelets are 215 INR 1.2.  Sodium 130 potassium 5.7 is noted creatinine 6.5 BUN is 104 gap is 17 bicarb 19.  CRP of 77.7.    As noted will be admitted medicine.  Nephrology been consulted also.         I have reviewed the nursing notes. I have reviewed the findings, diagnosis, plan and need for follow up with the patient.    New Prescriptions    No medications on file       Final diagnoses:   Acute kidney injury superimposed on chronic kidney disease (H)   Alcoholic cirrhosis of liver with ascites (H)   History of hemochromatosis   Diarrhea, unspecified type   Hyperkalemia       --  Jose Burton MD  [facility] EMERGENCY DEPARTMENT  12/16/2022    This note was created at least in part by the use of dragon voice dictation system. Inadvertent typographical errors may still exist.  Jose Burton MD.      Patient evaluated in the emergency department during the COVID-19 pandemic period. Careful attention to patients safety was addressed throughout the evaluation. Evaluation and treatment management was initiated with disposition made efficiently and appropriate as possible to minimize any risk of potential exposure to patient during this evaluation.       Jose Burton MD  12/16/22 8405

## 2022-12-16 NOTE — ED TRIAGE NOTES
Pt referred to ED by provider as he has starting the process for kidney and liver transplant. He lad lab work done this morning and his creatinine is elevated. Pt reports L flank pain and loose stools x3 days. Pt is not currently on dialysis. VSS     Triage Assessment     Row Name 12/16/22 1600       Triage Assessment (Adult)    Airway WDL WDL       Respiratory WDL    Respiratory WDL X    Rhythm/Pattern, Respiratory dyspnea on exertion       Cardiac WDL    Cardiac WDL WDL

## 2022-12-17 LAB
ANION GAP SERPL CALCULATED.3IONS-SCNC: 22 MMOL/L (ref 7–15)
ATRIAL RATE - MUSE: 75 BPM
BUN SERPL-MCNC: 106 MG/DL (ref 8–23)
C COLI+JEJUNI+LARI FUSA STL QL NAA+PROBE: NOT DETECTED
CALCIUM SERPL-MCNC: 8.6 MG/DL (ref 8.8–10.2)
CHLORIDE SERPL-SCNC: 95 MMOL/L (ref 98–107)
CREAT SERPL-MCNC: 6.37 MG/DL (ref 0.67–1.17)
DEPRECATED HCO3 PLAS-SCNC: 13 MMOL/L (ref 22–29)
DIASTOLIC BLOOD PRESSURE - MUSE: NORMAL MMHG
EC STX1 GENE STL QL NAA+PROBE: NOT DETECTED
EC STX2 GENE STL QL NAA+PROBE: NOT DETECTED
ERYTHROCYTE [DISTWIDTH] IN BLOOD BY AUTOMATED COUNT: 14.6 % (ref 10–15)
GFR SERPL CREATININE-BSD FRML MDRD: 9 ML/MIN/1.73M2
GLUCOSE SERPL-MCNC: 146 MG/DL (ref 70–99)
HCT VFR BLD AUTO: 37.3 % (ref 40–53)
HGB BLD-MCNC: 12.1 G/DL (ref 13.3–17.7)
INTERPRETATION ECG - MUSE: NORMAL
MCH RBC QN AUTO: 29.9 PG (ref 26.5–33)
MCHC RBC AUTO-ENTMCNC: 32.4 G/DL (ref 31.5–36.5)
MCV RBC AUTO: 92 FL (ref 78–100)
NOROV GI+II ORF1-ORF2 JNC STL QL NAA+PR: NOT DETECTED
P AXIS - MUSE: NORMAL DEGREES
PLATELET # BLD AUTO: 218 10E3/UL (ref 150–450)
POTASSIUM SERPL-SCNC: 5.1 MMOL/L (ref 3.4–5.3)
POTASSIUM SERPL-SCNC: 5.5 MMOL/L (ref 3.4–5.3)
POTASSIUM SERPL-SCNC: 5.6 MMOL/L (ref 3.4–5.3)
PR INTERVAL - MUSE: NORMAL MS
QRS DURATION - MUSE: 76 MS
QT - MUSE: 370 MS
QTC - MUSE: 402 MS
R AXIS - MUSE: 39 DEGREES
RBC # BLD AUTO: 4.05 10E6/UL (ref 4.4–5.9)
RVA NSP5 STL QL NAA+PROBE: NOT DETECTED
SALMONELLA SP RPOD STL QL NAA+PROBE: NOT DETECTED
SHIGELLA SP+EIEC IPAH STL QL NAA+PROBE: NOT DETECTED
SODIUM SERPL-SCNC: 130 MMOL/L (ref 136–145)
SYSTOLIC BLOOD PRESSURE - MUSE: NORMAL MMHG
T AXIS - MUSE: 18 DEGREES
V CHOL+PARA RFBL+TRKH+TNAA STL QL NAA+PR: NOT DETECTED
VENTRICULAR RATE- MUSE: 71 BPM
WBC # BLD AUTO: 12.2 10E3/UL (ref 4–11)
Y ENTERO RECN STL QL NAA+PROBE: NOT DETECTED

## 2022-12-17 PROCEDURE — 99222 1ST HOSP IP/OBS MODERATE 55: CPT | Mod: GC | Performed by: INTERNAL MEDICINE

## 2022-12-17 PROCEDURE — 85027 COMPLETE CBC AUTOMATED: CPT | Performed by: NURSE PRACTITIONER

## 2022-12-17 PROCEDURE — 250N000012 HC RX MED GY IP 250 OP 636 PS 637: Performed by: NURSE PRACTITIONER

## 2022-12-17 PROCEDURE — 999N000128 HC STATISTIC PERIPHERAL IV START W/O US GUIDANCE

## 2022-12-17 PROCEDURE — 120N000002 HC R&B MED SURG/OB UMMC

## 2022-12-17 PROCEDURE — 80048 BASIC METABOLIC PNL TOTAL CA: CPT | Performed by: NURSE PRACTITIONER

## 2022-12-17 PROCEDURE — 36415 COLL VENOUS BLD VENIPUNCTURE: CPT | Performed by: STUDENT IN AN ORGANIZED HEALTH CARE EDUCATION/TRAINING PROGRAM

## 2022-12-17 PROCEDURE — 84132 ASSAY OF SERUM POTASSIUM: CPT | Performed by: NURSE PRACTITIONER

## 2022-12-17 PROCEDURE — 250N000013 HC RX MED GY IP 250 OP 250 PS 637: Performed by: NURSE PRACTITIONER

## 2022-12-17 PROCEDURE — 99232 SBSQ HOSP IP/OBS MODERATE 35: CPT | Performed by: STUDENT IN AN ORGANIZED HEALTH CARE EDUCATION/TRAINING PROGRAM

## 2022-12-17 PROCEDURE — 84132 ASSAY OF SERUM POTASSIUM: CPT | Performed by: STUDENT IN AN ORGANIZED HEALTH CARE EDUCATION/TRAINING PROGRAM

## 2022-12-17 PROCEDURE — 36415 COLL VENOUS BLD VENIPUNCTURE: CPT | Performed by: NURSE PRACTITIONER

## 2022-12-17 PROCEDURE — 258N000003 HC RX IP 258 OP 636: Performed by: STUDENT IN AN ORGANIZED HEALTH CARE EDUCATION/TRAINING PROGRAM

## 2022-12-17 PROCEDURE — 250N000013 HC RX MED GY IP 250 OP 250 PS 637: Performed by: STUDENT IN AN ORGANIZED HEALTH CARE EDUCATION/TRAINING PROGRAM

## 2022-12-17 PROCEDURE — 99223 1ST HOSP IP/OBS HIGH 75: CPT | Mod: GC | Performed by: INTERNAL MEDICINE

## 2022-12-17 RX ORDER — LACTULOSE 10 G/15ML
20 SOLUTION ORAL 3 TIMES DAILY
Status: DISCONTINUED | OUTPATIENT
Start: 2022-12-17 | End: 2022-12-21 | Stop reason: HOSPADM

## 2022-12-17 RX ORDER — MULTIPLE VITAMINS W/ MINERALS TAB 9MG-400MCG
1 TAB ORAL DAILY
COMMUNITY
End: 2023-03-09

## 2022-12-17 RX ADMIN — VANCOMYCIN HYDROCHLORIDE 125 MG: 125 CAPSULE ORAL at 15:44

## 2022-12-17 RX ADMIN — Medication 1 TABLET: at 20:03

## 2022-12-17 RX ADMIN — Medication 1 TABLET: at 09:25

## 2022-12-17 RX ADMIN — VANCOMYCIN HYDROCHLORIDE 125 MG: 125 CAPSULE ORAL at 20:03

## 2022-12-17 RX ADMIN — PANTOPRAZOLE SODIUM 40 MG: 40 TABLET, DELAYED RELEASE ORAL at 09:24

## 2022-12-17 RX ADMIN — VANCOMYCIN HYDROCHLORIDE 125 MG: 125 CAPSULE ORAL at 09:26

## 2022-12-17 RX ADMIN — RIFAXIMIN 550 MG: 550 TABLET ORAL at 09:25

## 2022-12-17 RX ADMIN — DILTIAZEM HYDROCHLORIDE 120 MG: 120 CAPSULE, COATED, EXTENDED RELEASE ORAL at 13:20

## 2022-12-17 RX ADMIN — FOLIC ACID 5 MG: 1 TABLET ORAL at 09:24

## 2022-12-17 RX ADMIN — RIFAXIMIN 550 MG: 550 TABLET ORAL at 20:03

## 2022-12-17 RX ADMIN — PREDNISONE 40 MG: 20 TABLET ORAL at 09:24

## 2022-12-17 RX ADMIN — ZINC SULFATE 220 MG (50 MG) CAPSULE 220 MG: CAPSULE at 09:26

## 2022-12-17 RX ADMIN — VANCOMYCIN HYDROCHLORIDE 125 MG: 125 CAPSULE ORAL at 12:10

## 2022-12-17 RX ADMIN — SODIUM CHLORIDE, POTASSIUM CHLORIDE, SODIUM LACTATE AND CALCIUM CHLORIDE 1000 ML: 600; 310; 30; 20 INJECTION, SOLUTION INTRAVENOUS at 14:00

## 2022-12-17 RX ADMIN — LACTULOSE 20 G: 20 SOLUTION ORAL at 17:27

## 2022-12-17 RX ADMIN — RIFAXIMIN 550 MG: 550 TABLET ORAL at 00:48

## 2022-12-17 RX ADMIN — SODIUM ZIRCONIUM CYCLOSILICATE 10 G: 10 POWDER, FOR SUSPENSION ORAL at 20:03

## 2022-12-17 RX ADMIN — SODIUM ZIRCONIUM CYCLOSILICATE 10 G: 10 POWDER, FOR SUSPENSION ORAL at 17:27

## 2022-12-17 ASSESSMENT — ACTIVITIES OF DAILY LIVING (ADL)
ADLS_ACUITY_SCORE: 18
ADLS_ACUITY_SCORE: 35
ADLS_ACUITY_SCORE: 35
ADLS_ACUITY_SCORE: 18
ADLS_ACUITY_SCORE: 35
ADLS_ACUITY_SCORE: 18
ADLS_ACUITY_SCORE: 35
ADLS_ACUITY_SCORE: 18
ADLS_ACUITY_SCORE: 35
ADLS_ACUITY_SCORE: 18
ADLS_ACUITY_SCORE: 35
ADLS_ACUITY_SCORE: 35

## 2022-12-17 NOTE — CONSULTS
Nephrology Initial Consult  December 17, 2022      Damion Quinones MRN:[id number] YOB: 1955  Date of Admission:12/16/2022  Primary care provider: No Ref-Primary, Physician  Requesting physician: Lolis Agrawal MD    ASSESSMENT AND RECOMMENDATIONS:   #PATI on CKD  Significant recent diarrhea in the setting of C. Diff, Urine Na <20 suggesting likely acute pre-renal etiology. Currently on diuretics for ascites management. Received 1L NS overnight. Renal US with resolving hematoma and no evidence of obstruction. [place] UA.  -Agree with holding diuretics  -Can hold on albumin administration for now as albumin only mildly low  -Could consider additional 1L of crystalloid fluid  -Close monitoring of I/Os - if significant stool output continues consider IVF  -Start lokelma 10g q8 for management of hyperkalemia today      Recommendations were communicated to primary team verbally    Seen and discussed with Dr. 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 He presents with 3 days of diarrhea and labs noting PATI on CKD.    He has had 3 days of profuse diarrhea. Normally has 6-8 BM/day and has countless episodes for the past few days. Diagnosed with C diff. He reports poor appetite. Has tried to increase PO intake, but overall intake is limited.    Appears that he has had a persistently elevated MPO with concern for organ involvement of vasculitis.  There was no noted involvement on kidney biopsy. He has started on rituximab and a prednisone burst and taper - he has now come down to 5mg daily.    PAST MEDICAL HISTORY:  Reviewed  Past Medical History:   Diagnosis Date     Alcoholic cirrhosis of liver with ascites (H) 10/11/2019     Arthritis     RA     Chronic kidney disease      Coronary artery disease     AFib 2016     Diabetes mellitus type 2, uncomplicated (H) 10/11/2019     History of blood transfusion     2016     History of hemochromatosis 10/11/2019     Hypertension      Hypokalemia      Obesity      PAF (paroxysmal atrial fibrillation) (H)      Psoriatic arthritis (H)        Past Surgical History:   Procedure Laterality Date     APPENDECTOMY      Removed at 18 Years Old      COLONOSCOPY      2014 at [facility].      EYE SURGERY Bilateral     Cataract     GI SURGERY      TIPS     HERNIA REPAIR      History of bilateral inguinal hernia repair: 10/28/2014. Open hernia repair: 10/2017. Abdominal wound exploration and debridement 12/27/2017        MEDICATIONS:  PTA Meds  Prior to Admission medications    Medication Sig Last Dose Taking? Auth Provider Long Term End Date   spironolactone (ALDACTONE) 25 MG tablet Take 25 mg by mouth 2 times daily  Yes Reported, Patient     bumetanide (BUMEX) 1 MG tablet 2 mg 2 times daily   Reported, Patient Yes    Calcium Carbonate-Vitamin D 500-3.125 MG-MCG TABS Take 1 tablet by mouth 2 times daily   Reported, Patient     diltiazem ER (DILT-XR) 120 MG 24 hr capsule Take 120 mg by mouth   Reported, Patient Yes    folic acid (FOLVITE) 1 MG tablet TAKE FIVE TABLETS BY MOUTH EVERY DAY   Reported, Patient     lactulose (CHRONULAC) 10 GM/15ML solution TAKE 45 ML BY MOUTH THREE TIMES DAILY   Reported, Patient     pantoprazole (PROTONIX) 40 MG EC tablet Take 40 mg by mouth daily   Reported, Patient     predniSONE (DELTASONE) 5 MG tablet Take 15 mg by mouth   Reported, Patient     XIFAXAN 550 MG TABS tablet    Reported, Patient      Zinc Sulfate 220 (50 Zn) MG TABS Take 220 mg by mouth   Reported, Patient  23      Current Meds    calcium carbonate-vitamin D  1 tablet Oral BID     diltiazem ER  120 mg Oral Daily     folic acid  5 mg Oral Daily     pantoprazole  40 mg Oral Daily     predniSONE  40 mg Oral Daily     rifaximin  550 mg Oral BID     sodium chloride (PF)  3 mL Intracatheter Q8H     sodium chloride (PF)  3 mL Intracatheter Q8H     vancomycin  125 mg Oral 4x Daily     zinc sulfate  220 mg Oral Daily     Infusion Meds      ALLERGIES:    No Known Allergies    REVIEW OF SYSTEMS:  A comprehensive of systems was negative except as noted above.    SOCIAL HISTORY:   Social History     Socioeconomic History     Marital status:      Spouse name: Not on file     Number of children: Not on file     Years of education: Not on file     Highest education level: Not on file   Occupational History     Not on file   Tobacco Use     Smoking status: Never     Smokeless tobacco: Never   Substance and Sexual Activity     Alcohol use: Not Currently     Comment: Last ETOH use was 2021     Drug use: Not Currently     Sexual activity: Not on file   Other Topics Concern     Parent/sibling w/ CABG, MI or angioplasty before 65F 55M? Not Asked   Social History Narrative     Not on file     Social Determinants of Health     Financial Resource Strain: Not on file   Food Insecurity: Not on file   Transportation Needs: Not on file   Physical Activity: Not on file   Stress: Not on file   Social Connections: Not on file   Intimate Partner Violence: Not on file   Housing Stability: Not on file     Reviewed  Wife accompanies Damion Quinones in hospital room    FAMILY MEDICAL HISTORY:   Family History   Problem Relation Age of Onset     Lung Cancer Mother      Colon Cancer Father      Lung Cancer Brother      Heart Failure Brother      Kidney Disease Brother      Reviewed    PHYSICAL EXAM:   Temp  Av.4  F (36.9  C)  Min: 98.2  F (36.8  C)  Max: 98.5  " F (36.9  C)      Pulse  Av  Min: 70  Max: 92 Resp  Av  Min: 16  Max: 20  SpO2  Av.7 %  Min: 89 %  Max: 99 %       /80   Pulse 70   Temp 98.2  F (36.8  C) (Oral)   Resp 16   Ht 1.727 m (5' 8\")   Wt 90.7 kg (200 lb)   SpO2 (!) 89%   BMI 30.41 kg/m        Admit Weight: 90.7 kg (200 lb)     GENERAL APPEARANCE: no distress, awake  EYES: mild scleral icterus, pupils equal  Endo: no goiter, no moon facies  Lymphatics: no cervical or supraclavicular LAD  Pulmonary: breathing comfortably on RA  CV: regular rhythm, normal rate, no rubs   - Edema trace LEs  GI: soft, nontender, normal bowel sounds  MS: no evidence of inflammation in joints, no muscle tenderness  : no stauffer  SKIN: no rash, warm, dry, no cyanosis  NEURO: face symmetric, no asterixis     LABS:   CMP  Recent Labs   Lab 22  0114 22  1652   NA  --  130*   POTASSIUM 5.1 5.7*   CHLORIDE  --  94*   CO2  --  19*   ANIONGAP  --  17*   GLC  --  109*   BUN  --  104.0*   CR  --  6.50*   GFRESTIMATED  --  9*   NAZARIO  --  9.0   MAG  --  2.3   PHOS  --  6.3*   PROTTOTAL  --  6.2*   ALBUMIN  --  3.2*   BILITOTAL  --  0.9   ALKPHOS  --  227*   AST  --  47   ALT  --  34     CBC  Recent Labs   Lab 22  0744 22  1652   HGB 12.1* 12.2*   WBC 12.2* 18.2*   RBC 4.05* 4.14*   HCT 37.3* 37.2*   MCV 92 90   MCH 29.9 29.5   MCHC 32.4 32.8   RDW 14.6 14.6    215     INR  Recent Labs   Lab 22  1652   INR 1.20*   PTT 27     ABGNo lab results found in last 7 days.   URINE STUDIES  Recent Labs   Lab Test 22  1917 22  0858   COLOR Light Yellow Light Yellow   APPEARANCE Clear Clear   URINEGLC Negative Negative   URINEBILI Negative Negative   URINEKETONE Negative Negative   SG 1.010 1.009   UBLD Negative Negative   URINEPH 5.5 6.0   PROTEIN Negative Negative   NITRITE Negative Negative   LEUKEST Negative Negative   RBCU <1  --    WBCU <1  --      No lab results found.  PTH  No lab results found.  IRON STUDIES  Recent " Labs   Lab Test 11/22/22  0848   IRON 68   *   IRONSAT 31   HOLA 429*       IMAGING:  All imaging studies reviewed by me.     Biopsy 9/9/2022  - Focal global and focal segmental glomerular sclerosis (13% and 7%) with glomerular IgA deposition, see comment.  Interstitial hemorrhage and tubulointerstitial inflammation.  - Severe interstitial fibrosis and tubular atrophy (~80%).  - Severe arteriosclerosis.    Sandro Willett MD  Division of Renal Disease and Hypertension  [facility]  [employer] Web Console

## 2022-12-17 NOTE — H&P
[facility]    History and Physical - Hospitalist Service, GOLD TEAM        Date of Admission:  12/16/2022    Assessment & Plan      Damion Quinones is a 67 year old man admitted on 12/16/2022. He has a history of alcoholic cirrhosis c/b hepatic encephalopathy, esophageal varices, TIPS procedure as well as paroxysmal atrial fibrillation, CKD, psoriatic arthritis, hypertension and ANCA vasculitis who is admitted with PATI in the setting of CKD.    1) PATI on CKD - Presents with Cr worsening from 4.52 one month prior to 6.5 today in the setting of three days of frequent watery diarrhea.  BUN/Cr ratio concerning for an element of dehydration.  I note he is on bumex and spironolactone.  He has been holding his lactulose x1 day.  His renal biopsy on 9/9/2022 was read as suspicious for secondary IgA nephropathy but could not exclude un-sampled vasculitis.  - Hold bumex, spironolactone, lactulose.  Please monitor for diarrhea resolution and resume the later when able.  - SSCE, C Diff screen, COVID test  - 1L NS now  - Consult nephrology.  He is borderline for dialysis initiation but has no acute need overnight    2) Diarrhea - Watery, frequent and ongoing x3 days.  On antibiotics 2 weeks prior.  - C diff screen, SSCE, COVID test  - Holding lactulose and diuretics    3) Hyperkalemia - Slightly up at 5.7.  Will recheck tonight and potentially shift.  - K recheck this evening.    4) Hyponatremia - Mildly low and presumably secondary to hypovolemia although cirrhosis could be playing a role.  - Will hydrate with NS and recheck in the am.  If worsening, would consider urine osm, urine sodium and serum osm.    4) ESLD - Meld-NA 26 today which is worsened compared to prior.  Prior complications include varices w/ recent emergent TIPS, encephalopathy and ascites.  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  He is undergoing liver/kidney transplant evaluation and  "has his cardiology visit planned for 12/27.  - Consult hepatology  - Holding lactulose re: diarrhea  - Holding bumex/spironolactone  - BC obtained, please follow.  UA negative for infection.  Unable to perform US to eval for ascites as patient is currently in vertical treatment.  - Continue home rifaximin    5) Gout - Presents with swollen and tender R 2nd knuckle swelling.  - Will increase prednisone to 40 mg qday.    6) History of psoriatic arthritis, ANCA vasculitis  - Hold home prednisone:  Currently on prednisone 5 mg.  Patient plans to lower this to 2.5 mg next week per his rheumatologist.  - Start 40 mg prednisone for gout flare as above       Diet:   Regular   DVT Prophylaxis: Pneumatic Compression Devices  Lux Catheter: Not present  Central Lines: None  Cardiac Monitoring: None  Code Status:   Full    Clinically Significant Risk Factors Present on Admission        # Hyperkalemia: Highest K = 5.7 mmol/L in last 2 days, will monitor as appropriate  # Hyponatremia: Lowest Na = 130 mmol/L in last 2 days, will monitor as appropriate      # Hypoalbuminemia: Lowest albumin = 3.2 g/dL at 12/16/2022  4:52 PM, will monitor as appropriate  # Coagulation Defect: INR = 1.20 (Ref range: 0.85 - 1.15) and/or PTT = 27 Seconds (Ref range: 22 - 38 Seconds), will monitor for bleeding   # Acute Kidney Injury, unspecified: based on a >150% or 0.3 mg/dL increase in last creatinine compared to past 90 day average, will monitor renal function       # Obesity: Estimated body mass index is 30.41 kg/m  as calculated from the following:    Height as of this encounter: 1.727 m (5' 8\").    Weight as of this encounter: 90.7 kg (200 lb).           Disposition Plan      Expected Discharge Date: 12/18/2022                The patient's care was discussed with the Attending Physician, Dr. Atwood.    JADE Alvares [facility]  Hospitalist Service, [facility]  Securely message with " the [employer] Web Console (learn more here)  Text page via [facility] Paging/Directory   Please see signed in provider for up to date coverage information      ______________________________________________________________________    Chief Complaint     Referred in from clinic with PATI on CKD    History is obtained from the patient    History of Present Illness   Damion Quinones is a 67 year old man admitted on 12/16/2022. He has a history of alcoholic cirrhosis c/b hepatic encephalopathy, esophageal varices, TIPS procedure as well as paroxysmal atrial fibrillation, CKD, psoriatic arthritis, hypertension and ANCA vasculitis who is admitted with PATI in the setting of CKD.    The patient tells me he was seen in clinic today, told his creatinine was increasing and referred to the ED for treatment.    He notes a number of concerns.  For one, he is being worked up for liver and kidney transplant, with his next appointment scheduled for 12/27 with cardiology.    He has also been having watery diarrhea for three days.  He believes he was on antibiotics for cellulitis ~2 weeks prior.  No one else in his family is sick.  He has not had any associated nausea, vomiting or abdominal pain.  He has not had fevers or chills.  He stopped taking his lactulose today due to fears it was contributing.    He also noted swelling on his R knuckle 3 days ago that feels like gout.  He tells me that he was on humira and prednisone for years and had no episodes, but is currently off humira and has been weaning down his prednisone.    Review of Systems    The 10 point Review of Systems is negative other than noted in the HPI or here.     Past Medical History    I have reviewed this patient's medical history and updated it with pertinent information if needed.   Past Medical History:   Diagnosis Date     Alcoholic cirrhosis of liver with ascites (H) 10/11/2019     Arthritis     RA     Chronic kidney disease      Coronary artery disease     AFib 2016      Diabetes mellitus type 2, uncomplicated (H) 10/11/2019     History of blood transfusion     2016     History of hemochromatosis 10/11/2019     Hypertension      Hypokalemia      Obesity      PAF (paroxysmal atrial fibrillation) (H)      Psoriatic arthritis (H)        Past Surgical History   I have reviewed this patient's surgical history and updated it with pertinent information if needed.  Past Surgical History:   Procedure Laterality Date     APPENDECTOMY      Removed at 18 Years Old      COLONOSCOPY      2014 at [facility].      EYE SURGERY Bilateral     Cataract     GI SURGERY      TIPS     HERNIA REPAIR      History of bilateral inguinal hernia repair: 10/28/2014. Open hernia repair: 10/2017. Abdominal wound exploration and debridement 12/27/2017       Social History   I have reviewed this patient's social history and updated it with pertinent information if needed.  Social History     Tobacco Use     Smoking status: Never     Smokeless tobacco: Never   Substance Use Topics     Alcohol use: Not Currently     Comment: Last ETOH use was 12/31/2021     Drug use: Not Currently       Family History   I have reviewed this patient's family history and updated it with pertinent information if needed.  Family History   Problem Relation Age of Onset     Lung Cancer Mother      Colon Cancer Father      Lung Cancer Brother      Heart Failure Brother      Kidney Disease Brother        Prior to Admission Medications   Prior to Admission Medications   Prescriptions Last Dose Informant Patient Reported? Taking?   Calcium Carbonate-Vitamin D 500-3.125 MG-MCG TABS   Yes No   Sig: Take 1 tablet by mouth 2 times daily   XIFAXAN 550 MG TABS tablet   Yes No   Zinc Sulfate 220 (50 Zn) MG TABS   Yes No   Sig: Take 220 mg by mouth   bumetanide (BUMEX) 1 MG tablet   Yes No   diltiazem ER (DILT-XR) 120 MG 24 hr capsule   Yes No   Sig: Take 120 mg by mouth   folic acid (FOLVITE) 1 MG tablet   Yes No   Sig: TAKE FIVE TABLETS BY MOUTH  EVERY DAY   lactulose (CHRONULAC) 10 GM/15ML solution   Yes No   Sig: TAKE 45 ML BY MOUTH THREE TIMES DAILY   pantoprazole (PROTONIX) 40 MG EC tablet   Yes No   Sig: Take 40 mg by mouth daily   potassium chloride ER (KLOR-CON M) 20 MEQ CR tablet   Yes No   Sig: Take 20 mEq by mouth 2 times daily   predniSONE (DELTASONE) 5 MG tablet   Yes No   Sig: Take 15 mg by mouth      Facility-Administered Medications: None     Allergies   No Known Allergies    Physical Exam   Vital Signs: Temp: 98.5  F (36.9  C) Temp src: Oral BP: 123/79 Pulse: 82   Resp: 20 SpO2: 96 % O2 Device: None (Room air)    Weight: 200 lbs 0 oz    Physical Exam   Constitutional:   Well nourished, well developed, resting comfortably   Head: Normocephalic and atraumatic.   Eyes: Conjunctivae are normal. Pupils are equal, round, and reactive to light.    Oropharynx: Pharynx has no erythema or exudate, mucous membranes are moist  Cardiovascular: Regular rate and rhythm without murmurs or gallops  Pulmonary/Chest: Clear to auscultation bilaterally, with no wheezes or retractions. No respiratory distress.  GI: Soft with good bowel sounds.  Non-tender, non-distended, with no guarding, no rebound, no peritoneal signs.   Back:  No bony or CVA tenderness   Musculoskeletal:  No edema or clubbing   Skin: Skin is warm and dry. No rash noted.   Neurological: Alert and oriented to person, place, and time. Nonfocal exam  Psychiatric:  Normal mood and affect.      Data   Data reviewed today: I reviewed all medications, new labs and imaging results over the last 24 hours.

## 2022-12-17 NOTE — PLAN OF CARE
Goal Outcome Evaluation:VSS,A&Ox4 ,up indep.denied pain and nausea,regular diet, good appetite,LS clear,BS+,had x 1 loose BM ,reported adequate urinary output.Pt.'s wife at bedside.Pt transferred to 5 A unit,report given to receiving nurse.

## 2022-12-17 NOTE — PROGRESS NOTES
[facility]    Medicine Progress Note - Hospitalist Service, GOLD TEAM 11    Date of Admission:  12/16/2022    Assessment & Plan      Damion Quinones is a 67 year old man admitted on 12/16/2022. He has a history of alcoholic cirrhosis c/b hepatic encephalopathy, esophageal varices, TIPS procedure as well as paroxysmal atrial fibrillation, CKD, psoriatic arthritis, hypertension and ANCA vasculitis who is admitted with PATI in the setting of CKD.    # PATI on CKD  Presents with Cr worsening from 4.52 one month prior to 6.5 today in the setting of three days of frequent watery diarrhea, found to have C diff. Likely pre-renal in the setting of diarrhea and continued diuretic and lactulose use (lactulose was held for 1 day), but could be ATN. His renal biopsy on 9/9/2022 was read as suspicious for secondary IgA nephropathy but could not exclude un-sampled vasculitis.  - Hold bumex and spironolactone  - Hold lactulose for now, but will likely resume soon as diarrhea slows down  - Nephrology consult  - Additional 1L LR over today  - No urgent indication for dialysis currently    # Cdiff colitis  Watery, frequent and ongoing x3 days.  On antibiotics 2 weeks prior. Cdiff positive. Enteric panel negative.  - Start vancomycin 125mg QID  - Holding lactulose and diuretics    # Hyperkalemia secondary to PATI on CKD  Slightly up at 5.7. Improved with shifting, but then rising again.  - Start lokelma 10mg TID for 2 days  - Recheck potassium this PM    # Hyponatremia  Mildly low and presumably secondary to hypovolemia although cirrhosis could be playing a role.  - Will hydrate with NS and recheck in the am.  If worsening, would consider urine osm, urine sodium and serum osm.    # ESLD  Meld-NA 26 today which is worsened compared to prior.  Prior complications include varices w/ recent emergent TIPS, encephalopathy and ascites.  His cirrhosis is secondary to alcohol but he has been sober for  years.  He is undergoing liver/kidney transplant evaluation and has his cardiology visit planned for 12/27.  - Consult hepatology  - Holding lactulose re: diarrhea - low threshold to restart as pt has history of easily onset HE  - Holding bumex/spironolactone  - Continue home rifaximin    # Gout - Presents with swollen and tender R 2nd knuckle swelling.  - Continue prednisone to 40 mg qday.    # History of psoriatic arthritis, ANCA vasculitis  - Hold home prednisone:  Currently on prednisone 5 mg.  Patient plans to lower this to 2.5 mg next week per his rheumatologist.  - Start 40 mg prednisone for gout flare as above         Diet: Combination Diet Regular Diet Adult    DVT Prophylaxis: Pneumatic Compression Devices  Lux Catheter: Not present  Central Lines: None  Cardiac Monitoring: ACTIVE order. Indication: Electrolyte Imbalance (24 hours)- Magnesium <1.3 mg/ml; Potassium < =2.8 or > 5.5 mg/ml  Code Status: Full Code      Disposition Plan      Expected Discharge Date: 12/18/2022                The patient's care was discussed with the Bedside Nurse, Patient and Renal Consultant.    Lolis Agrawal MD  Hospitalist Service, GOLD TEAM [address] [place] [address]  Securely message with the Vocera Web Console (learn more here)  Text page via [facility] Paging/Directory   Please see signed in provider for up to date coverage information      Clinically Significant Risk Factors Present on Admission        # Hyperkalemia: Highest K = 5.7 mmol/L in last 2 days, will monitor as appropriate  # Hyponatremia: Lowest Na = 130 mmol/L in last 2 days, will monitor as appropriate      # Hypoalbuminemia: Lowest albumin = 3.2 g/dL at 12/16/2022  4:52 PM, will monitor as appropriate  # Coagulation Defect: INR = 1.20 (Ref range: 0.85 - 1.15) and/or PTT = 27 Seconds (Ref range: 22 - 38 Seconds), will monitor for bleeding   # Acute Kidney Injury, unspecified: based on a >150% or 0.3 mg/dL increase in  "last creatinine compared to past 90 day average, will monitor renal function       # Obesity: Estimated body mass index is 30.41 kg/m  as calculated from the following:    Height as of this encounter: 1.727 m (5' 8\").    Weight as of this encounter: 90.7 kg (200 lb).           ______________________________________________________________________    Interval History    No acute events overnight. Feeling improved this morning. Diarrhea has slowed down, no BM since yesterday evening (when they took the test). Denies fevers or chills. No abdominal pain. LE swelling minimal.    Data reviewed today: I reviewed all medications, new labs and imaging results over the last 24 hours. I personally reviewed no images or EKG's today.    Physical Exam   Vital Signs: Temp: 98.2  F (36.8  C) Temp src: Oral BP: 124/80 Pulse: 70   Resp: 16 SpO2: (!) 89 % O2 Device: None (Room air)    Weight: 200 lbs 0 oz    General Appearance: NAD. Lying comfortably in bed.  Respiratory: CTAB. No increased WOB.  Cardiovascular: RRR. No m/r/g. Trace edema bilaterally.  GI: Soft. Non-tender. Non-distended.  Skin: No rash.  Other: AOx4. Moving all extremities. Normal affect      Data   Recent Labs   Lab 12/17/22  0744 12/17/22  0114 12/16/22  1652   WBC 12.2*  --  18.2*   HGB 12.1*  --  12.2*   MCV 92  --  90     --  215   INR  --   --  1.20*   *  --  130*   POTASSIUM 5.6* 5.1 5.7*   CHLORIDE 95*  --  94*   CO2 13*  --  19*   .0*  --  104.0*   CR 6.37*  --  6.50*   ANIONGAP 22*  --  17*   NAZARIO 8.6*  --  9.0   *  --  109*   ALBUMIN  --   --  3.2*   PROTTOTAL  --   --  6.2*   BILITOTAL  --   --  0.9   ALKPHOS  --   --  227*   ALT  --   --  34   AST  --   --  47   LIPASE  --   --  85*     Recent Results (from the past 24 hour(s))   US Renal Complete Non-Vascular    Narrative    EXAMINATION: US RENAL COMPLETE NON-VASCULAR, 12/16/2022 6:05 PM     COMPARISON: Ultrasound 11/22/2022    HISTORY: PATI on CKD evaluation    TECHNIQUE: The " kidneys and bladder were scanned in the standard  fashion with specialized ultrasound transducer(s) using both gray  scale and limited color/spectral Doppler techniques.    FINDINGS:    Right kidney: Measures 9.3 cm in length. Parenchyma is of normal  echogenicity. No focal mass. No hydronephrosis.    Left kidney: Measures 9.5 cm in length. Parenchyma is of normal  echogenicity. No focal mass. No hydronephrosis. Decreased size of the  hypoechoic subcapsular collection measuring 6.2 x 4.6 x 3.1 cm,  previously 7.5 x 4.9 x 2.6 cm.    Bladder: Moderately distended and within normal limits.      Impression    IMPRESSION:  1.  Decreased size of the left renal subcapsular collection,  presumably resolving subcapsular hematoma from prior renal biopsy.  2.  No hydronephrosis.    I have personally reviewed the examination and initial interpretation  and I agree with the findings.    MARK ALVAREZ MD         SYSTEM ID:  [id number]

## 2022-12-17 NOTE — CONSULTS
GASTROENTEROLOGY CONSULTATION      Date of Admission:  12/16/2022           Reason for Consultation:   We were asked by Dandy Griffin to evaluate this patient with decompensated cirrhosis, diarrhea and PATI           ASSESSMENT AND RECOMMENDATIONS:   Assessment:  67 year old male with PMHx of decompensated alcohol + hemochromatosis (homozygous H63D) cirrhosis complicated by variceal bleeding s/p TIPS (10/1/2022) and hepatic encephalopathy. PMHx also includes alcohol overuse, CKD (IgA nephropathy + ANCA vasculitis), psoriatic arthritis, atrial fibrillation and HTN. He is admitted with diarrhea and worsening renal function, and found to be C diff positive.    # Severe C diff colitis  Describes 3 to 4 days of profuse watery diarrhea following antibiotics for skin/soft tissue infection a couple of weeks ago, with C. difficile PCR positive in the ED, with subsequent GDH antigen and toxin positive as well, indicating true infection. Given leukocytosis and creatinine rise, meets criteria for severe C. difficile.  No evidence of fulminant disease.  Started on p.o. vancomycin 125 mg.    # Acute kidney injury on CKD  Cr 4.5 11/22, now 6.5 as of 12/16. Suspect this is related to prerenal physiology in the setting of significant diarrhea over the past several days in addition to diuretics.  Urine sediment is bland.  Okay from hepatology standpoint to hold diuretics during PATI.  Nephrology is on board.      #. Cirrhosis 2/2 alcohol +/- hemochromatosis, MELD-Na 26  - hx HE following TIPS, on lactulose and rifaximin, not encephalopathic on admission. Holding lactulose due to diarrhea from C diff as above.  - no hx ascites  - hx variceal bleed s/p TIPS (10/1/2022), no recurrent bleeding  - last EGD 9/2022: Small EV, IGV1 oozing, portal HTN gastropathy, IGV2 - s/p TIPS 10/1/2022  - HCC screening US 11/2022 without focal liver lesion  - Sees Dr. Bermudez/Virgilio as an outpatient, last 11/22/22  - transplant evaluation: ongoing for liver  kidney transplant    MELD-Na score: 26 at 12/16/2022  4:52 PM  MELD score: 22 at 12/16/2022  4:52 PM  Calculated from:  Serum Creatinine: 6.50 mg/dL (Using max of 4 mg/dL) at 12/16/2022  4:52 PM  Serum Sodium: 130 mmol/L at 12/16/2022  4:52 PM  Total Bilirubin: 0.9 mg/dL (Using min of 1 mg/dL) at 12/16/2022  4:52 PM  INR(ratio): 1.20 at 12/16/2022  4:52 PM  Age: 67 years      Recommendations  -Continue p.o. vancomycin 125 mg 4 times daily for 10 days  -Would hold lactulose for now until diarrhea resolves with vancomycin, then can restart  -Continue rifaximin and zinc  -Hold diuretics  -Defer need for albumin to nephrology team  -please obtain inpatient echocardiogram as part of transplant eval (previously scheduled for outpatient early next week)  -We will continue to follow    Gastroenterology outpatient follow up recommendations: TBD    Thank you for involving us in this patient's care. Please do not hesitate to contact the GI service with any questions or concerns.     Pt care plan discussed with Dr. Poole, GI staff physician.    Abdulaziz Cai MD  -------------------------------------------------------------------------------------------------------------------           History of Present Illness:       Describes onset of watery nonbloody diarrhea multiple times daily over the past 3 to 4 days.  This is in the setting of recent oral antibiotic course for skin and soft tissue infection, which finished approximately 2 weeks ago. In the setting of this, was seen in clinic and found to have significant worsening of his creatinine, and told to present to the emergency department for evaluation.    Evaluation here notable for leukocytosis with white blood cell count of 18.2.  Creatinine 6.5 (was 4.5 at the end of November).  Urinalysis bland.  C. difficile PCR returned positive, along with GDH antigen and toxin.    He notes he has been taking lactulose at home up until yesterday, when he discontinued it due to his  diarrhea.  Continues to take rifaximin.  Denies any encephalopathy symptoms.  Denies any hematemesis, melena, hematochezia concerning for GI bleed.  No worsening abdominal distention or pain.            Past Medical History:   Reviewed and edited as appropriate  Past Medical History:   Diagnosis Date     Alcoholic cirrhosis of liver with ascites (H) 10/11/2019     Arthritis     RA     Chronic kidney disease      Coronary artery disease     AFib 2016     Diabetes mellitus type 2, uncomplicated (H) 10/11/2019     History of blood transfusion     2016     History of hemochromatosis 10/11/2019     Hypertension      Hypokalemia      Obesity      PAF (paroxysmal atrial fibrillation) (H)      Psoriatic arthritis (H)             Past Surgical History:   Reviewed and edited as appropriate   Past Surgical History:   Procedure Laterality Date     APPENDECTOMY      Removed at 18 Years Old      COLONOSCOPY      2014 at [facility]      EYE SURGERY Bilateral     Cataract     GI SURGERY      TIPS     HERNIA REPAIR      History of bilateral inguinal hernia repair: 10/28/2014. Open hernia repair: 10/2017. Abdominal wound exploration and debridement 12/27/2017            Previous Endoscopy:   No results found for this or any previous visit.         Social History:   Reviewed and edited as appropriate  Social History     Socioeconomic History     Marital status:      Spouse name: Not on file     Number of children: Not on file     Years of education: Not on file     Highest education level: Not on file   Occupational History     Not on file   Tobacco Use     Smoking status: Never     Smokeless tobacco: Never   Substance and Sexual Activity     Alcohol use: Not Currently     Comment: Last ETOH use was 12/31/2021     Drug use: Not Currently     Sexual activity: Not on file   Other Topics Concern     Parent/sibling w/ CABG, MI or angioplasty before 65F 55M? Not Asked   Social History Narrative     Not on file     Social  Determinants of Health     Financial Resource Strain: Not on file   Food Insecurity: Not on file   Transportation Needs: Not on file   Physical Activity: Not on file   Stress: Not on file   Social Connections: Not on file   Intimate Partner Violence: Not on file   Housing Stability: Not on file            Family History:   Reviewed and edited as appropriate  Family History   Problem Relation Age of Onset     Lung Cancer Mother      Colon Cancer Father      Lung Cancer Brother      Heart Failure Brother      Kidney Disease Brother      No known history of colorectal cancer, liver disease, or inflammatory bowel disease.         Allergies:   Reviewed and edited as appropriate   No Known Allergies         Medications:     Current Facility-Administered Medications   Medication     acetaminophen (TYLENOL) tablet 650 mg    Or     acetaminophen (TYLENOL) Suppository 650 mg     calcium carbonate-vitamin D (OSCAL) 500-5 MG-MCG per tablet 1 tablet     diltiazem ER COATED BEADS (CARDIZEM CD/CARTIA XT) 24 hr capsule 120 mg     folic acid (FOLVITE) tablet 5 mg     lactulose (CHRONULAC) solution 20 g     lidocaine (LMX4) cream     lidocaine (LMX4) cream     lidocaine 1 % 0.1-1 mL     lidocaine 1 % 0.1-1 mL     melatonin tablet 1 mg     ondansetron (ZOFRAN ODT) ODT tab 4 mg    Or     ondansetron (ZOFRAN) injection 4 mg     pantoprazole (PROTONIX) EC tablet 40 mg     predniSONE (DELTASONE) tablet 40 mg     prochlorperazine (COMPAZINE) injection 5 mg    Or     prochlorperazine (COMPAZINE) tablet 5 mg    Or     prochlorperazine (COMPAZINE) suppository 12.5 mg     rifaximin (XIFAXAN) tablet 550 mg     sodium chloride (PF) 0.9% PF flush 3 mL     sodium chloride (PF) 0.9% PF flush 3 mL     sodium chloride (PF) 0.9% PF flush 3 mL     sodium chloride (PF) 0.9% PF flush 3 mL     vancomycin (VANCOCIN) capsule 125 mg     zinc sulfate (ZINCATE) capsule 220 mg     Current Outpatient Medications   Medication Sig     bumetanide (BUMEX) 1 MG  "tablet 2 mg 2 times daily     Calcium Carbonate-Vitamin D 500-3.125 MG-MCG TABS Take 1 tablet by mouth 2 times daily     diltiazem ER (DILT-XR) 120 MG 24 hr capsule Take 120 mg by mouth     folic acid (FOLVITE) 1 MG tablet TAKE FIVE TABLETS BY MOUTH EVERY DAY     lactulose (CHRONULAC) 10 GM/15ML solution TAKE 45 ML BY MOUTH THREE TIMES DAILY     multivitamin w/minerals (THERA-VIT-M) tablet Take 1 tablet by mouth daily     pantoprazole (PROTONIX) 40 MG EC tablet Take 20 mg by mouth daily     predniSONE (DELTASONE) 5 MG tablet Take 10 mg by mouth     spironolactone (ALDACTONE) 25 MG tablet Take 25 mg by mouth 2 times daily     XIFAXAN 550 MG TABS tablet Take 550 mg by mouth 2 times daily     Zinc Sulfate 220 (50 Zn) MG TABS Take 220 mg by mouth             Review of Systems:     A complete 10 point review of systems was performed and is negative except as noted in the HPI           Physical Exam:   /80   Pulse 70   Temp 98.2  F (36.8  C) (Oral)   Resp 16   Ht 1.727 m (5' 8\")   Wt 90.7 kg (200 lb)   SpO2 (!) 89%   BMI 30.41 kg/m    Wt:   Wt Readings from Last 2 Encounters:   12/16/22 90.7 kg (200 lb)   11/29/22 102.1 kg (225 lb)      Constitutional: No acute distress, resting comfortably in bed  Eyes: Sclera anicteric  Ears/nose/mouth/throat: Moist mucus membranes, hearing intact  CV: No edema  Respiratory: Breathing comfortably on room air  Abd: Soft, obese abdomen, mildly distended, nontender to palpation throughout, nondistended, bowel sounds present  Skin: warm, perfused, no jaundice  Neuro: AAO x 3, no asterixis  Psych: Normal affect  MSK: No gross deformities         Data:   Labs and imaging below were independently reviewed and interpreted    BMP  Recent Labs   Lab 12/17/22  0114 12/16/22  1652   NA  --  130*   POTASSIUM 5.1 5.7*   CHLORIDE  --  94*   NAZARIO  --  9.0   CO2  --  19*   BUN  --  104.0*   CR  --  6.50*   GLC  --  109*     CBC  Recent Labs   Lab 12/16/22  1652   WBC 18.2*   RBC 4.14*   HGB " 12.2*   HCT 37.2*   MCV 90   MCH 29.5   MCHC 32.8   RDW 14.6        INR  Recent Labs   Lab 12/16/22  1652   INR 1.20*     LFTs  Recent Labs   Lab 12/16/22  1652   ALKPHOS 227*   AST 47   ALT 34   BILITOTAL 0.9   PROTTOTAL 6.2*   ALBUMIN 3.2*      PANC  Recent Labs   Lab 12/16/22  1652   LIPASE 85*       Imaging: no relevant imaging

## 2022-12-17 NOTE — PLAN OF CARE
Goal Outcome Evaluation:           Overall Patient Progress: no changeOverall Patient Progress: no change    Outcome Evaluation: From [facility] at 1330. A&Ox4. VSS on RA. Up ad ari. Giving 1L bolus. Denies pain. Wife at bedside. Awaiting nephrology consult.

## 2022-12-17 NOTE — ED NOTES
Spoke to lab they state the stool sample that was sent was not enough stool/not mixed well enough. They want the Enteric bacteria and virus panel by [facility] Stool redrawn.

## 2022-12-18 LAB
ANION GAP SERPL CALCULATED.3IONS-SCNC: 17 MMOL/L (ref 7–15)
ANION GAP SERPL CALCULATED.3IONS-SCNC: 17 MMOL/L (ref 7–15)
BUN SERPL-MCNC: 104 MG/DL (ref 8–23)
BUN SERPL-MCNC: 109 MG/DL (ref 8–23)
CALCIUM SERPL-MCNC: 8.6 MG/DL (ref 8.8–10.2)
CALCIUM SERPL-MCNC: 8.7 MG/DL (ref 8.8–10.2)
CHLORIDE SERPL-SCNC: 95 MMOL/L (ref 98–107)
CHLORIDE SERPL-SCNC: 98 MMOL/L (ref 98–107)
CREAT SERPL-MCNC: 5.81 MG/DL (ref 0.67–1.17)
CREAT SERPL-MCNC: 6.27 MG/DL (ref 0.67–1.17)
DEPRECATED HCO3 PLAS-SCNC: 18 MMOL/L (ref 22–29)
DEPRECATED HCO3 PLAS-SCNC: 19 MMOL/L (ref 22–29)
ERYTHROCYTE [DISTWIDTH] IN BLOOD BY AUTOMATED COUNT: 14.6 % (ref 10–15)
GFR SERPL CREATININE-BSD FRML MDRD: 10 ML/MIN/1.73M2
GFR SERPL CREATININE-BSD FRML MDRD: 9 ML/MIN/1.73M2
GLUCOSE SERPL-MCNC: 121 MG/DL (ref 70–99)
GLUCOSE SERPL-MCNC: 137 MG/DL (ref 70–99)
HCT VFR BLD AUTO: 33.7 % (ref 40–53)
HGB BLD-MCNC: 11.1 G/DL (ref 13.3–17.7)
MCH RBC QN AUTO: 30.2 PG (ref 26.5–33)
MCHC RBC AUTO-ENTMCNC: 32.9 G/DL (ref 31.5–36.5)
MCV RBC AUTO: 92 FL (ref 78–100)
PLATELET # BLD AUTO: 209 10E3/UL (ref 150–450)
POTASSIUM SERPL-SCNC: 4.6 MMOL/L (ref 3.4–5.3)
POTASSIUM SERPL-SCNC: 5.2 MMOL/L (ref 3.4–5.3)
RBC # BLD AUTO: 3.67 10E6/UL (ref 4.4–5.9)
SODIUM SERPL-SCNC: 131 MMOL/L (ref 136–145)
SODIUM SERPL-SCNC: 133 MMOL/L (ref 136–145)
WBC # BLD AUTO: 13.8 10E3/UL (ref 4–11)

## 2022-12-18 PROCEDURE — 99233 SBSQ HOSP IP/OBS HIGH 50: CPT | Mod: GC | Performed by: INTERNAL MEDICINE

## 2022-12-18 PROCEDURE — 36415 COLL VENOUS BLD VENIPUNCTURE: CPT | Performed by: STUDENT IN AN ORGANIZED HEALTH CARE EDUCATION/TRAINING PROGRAM

## 2022-12-18 PROCEDURE — 85027 COMPLETE CBC AUTOMATED: CPT | Performed by: STUDENT IN AN ORGANIZED HEALTH CARE EDUCATION/TRAINING PROGRAM

## 2022-12-18 PROCEDURE — 250N000012 HC RX MED GY IP 250 OP 636 PS 637: Performed by: NURSE PRACTITIONER

## 2022-12-18 PROCEDURE — 99232 SBSQ HOSP IP/OBS MODERATE 35: CPT | Performed by: STUDENT IN AN ORGANIZED HEALTH CARE EDUCATION/TRAINING PROGRAM

## 2022-12-18 PROCEDURE — 250N000013 HC RX MED GY IP 250 OP 250 PS 637: Performed by: NURSE PRACTITIONER

## 2022-12-18 PROCEDURE — 120N000002 HC R&B MED SURG/OB UMMC

## 2022-12-18 PROCEDURE — 250N000013 HC RX MED GY IP 250 OP 250 PS 637: Performed by: STUDENT IN AN ORGANIZED HEALTH CARE EDUCATION/TRAINING PROGRAM

## 2022-12-18 PROCEDURE — 80048 BASIC METABOLIC PNL TOTAL CA: CPT | Performed by: STUDENT IN AN ORGANIZED HEALTH CARE EDUCATION/TRAINING PROGRAM

## 2022-12-18 PROCEDURE — 258N000003 HC RX IP 258 OP 636: Performed by: STUDENT IN AN ORGANIZED HEALTH CARE EDUCATION/TRAINING PROGRAM

## 2022-12-18 RX ADMIN — RIFAXIMIN 550 MG: 550 TABLET ORAL at 08:29

## 2022-12-18 RX ADMIN — VANCOMYCIN HYDROCHLORIDE 125 MG: 125 CAPSULE ORAL at 08:30

## 2022-12-18 RX ADMIN — LACTULOSE 20 G: 20 SOLUTION ORAL at 08:31

## 2022-12-18 RX ADMIN — PREDNISONE 40 MG: 20 TABLET ORAL at 08:30

## 2022-12-18 RX ADMIN — VANCOMYCIN HYDROCHLORIDE 125 MG: 125 CAPSULE ORAL at 19:30

## 2022-12-18 RX ADMIN — VANCOMYCIN HYDROCHLORIDE 125 MG: 125 CAPSULE ORAL at 16:37

## 2022-12-18 RX ADMIN — FOLIC ACID 5 MG: 1 TABLET ORAL at 08:30

## 2022-12-18 RX ADMIN — RIFAXIMIN 550 MG: 550 TABLET ORAL at 19:30

## 2022-12-18 RX ADMIN — LACTULOSE 20 G: 20 SOLUTION ORAL at 19:30

## 2022-12-18 RX ADMIN — ZINC SULFATE 220 MG (50 MG) CAPSULE 220 MG: CAPSULE at 08:30

## 2022-12-18 RX ADMIN — DILTIAZEM HYDROCHLORIDE 120 MG: 120 CAPSULE, COATED, EXTENDED RELEASE ORAL at 08:30

## 2022-12-18 RX ADMIN — SODIUM ZIRCONIUM CYCLOSILICATE 10 G: 10 POWDER, FOR SUSPENSION ORAL at 15:50

## 2022-12-18 RX ADMIN — SODIUM ZIRCONIUM CYCLOSILICATE 10 G: 10 POWDER, FOR SUSPENSION ORAL at 08:29

## 2022-12-18 RX ADMIN — PANTOPRAZOLE SODIUM 40 MG: 40 TABLET, DELAYED RELEASE ORAL at 08:30

## 2022-12-18 RX ADMIN — Medication 1 TABLET: at 08:30

## 2022-12-18 RX ADMIN — Medication 1 TABLET: at 19:30

## 2022-12-18 RX ADMIN — SODIUM CHLORIDE, POTASSIUM CHLORIDE, SODIUM LACTATE AND CALCIUM CHLORIDE 1000 ML: 600; 310; 30; 20 INJECTION, SOLUTION INTRAVENOUS at 13:58

## 2022-12-18 RX ADMIN — VANCOMYCIN HYDROCHLORIDE 125 MG: 125 CAPSULE ORAL at 12:25

## 2022-12-18 RX ADMIN — SODIUM ZIRCONIUM CYCLOSILICATE 10 G: 10 POWDER, FOR SUSPENSION ORAL at 19:31

## 2022-12-18 ASSESSMENT — ACTIVITIES OF DAILY LIVING (ADL)
ADLS_ACUITY_SCORE: 18

## 2022-12-18 NOTE — PROGRESS NOTES
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 He presents with 3 days of diarrhea and labs noting PATI on CKD.    #PATI on CKD  Significant recent diarrhea in the setting of C. Diff, Urine Na <20 suggesting likely acute pre-renal etiology. Currently on diuretics for ascites management. Received 1L NS overnight. Renal US with resolving hematoma and no evidence of obstruction. Cardwell UA.  -Continue to hold diuretics  -Can hold on albumin administration for now as albumin only mildly low  -Could consider additional 1L of crystalloid fluid given ongoing diarrhea  -Close monitoring of I/Os - if significant stool output continues consider additional IVF  -Will continue lokelma 10g q8 for 48 hours  -Continue to monitor creatinine trend      Recommendations were communicated to primary team via note    Seen and discussed with Dr. Theresa Willett MD   Division of Renal Disease and Hypertension  [facility]  [email or web address]  Vocera Web Console      Interval History :   Nursing and provider notes from last 24 hours reviewed.  In the last 24 hours Damion Quinones notes limited diarrhea during the day yesterday however several episodes overnight.  No lower extremity edema or difficulty breathing.    Physical Exam:   I/O last 3 completed shifts:  In: 720 [P.O.:720]  Out: -    /72 (BP Location: Right arm)   Pulse 81   Temp 98.4  F (36.9  C) (Oral)   Resp 18   Ht 1.727 m (5' 8\")   Wt 99.9 kg (220 lb 4.8 oz)   SpO2 98%   BMI 33.50 kg/m       GENERAL APPEARANCE: no distress, awake  EYES: mild " scleral icterus, pupils equal  Endo: no goiter, no moon facies  Lymphatics: no cervical or supraclavicular LAD  Pulmonary: breathing comfortably on RA  CV: regular rhythm, normal rate, no rubs   - Edema trace LEs  GI: soft, nontender, normal bowel sounds  MS: no evidence of inflammation in joints, no muscle tenderness  : no stauffer  SKIN: no rash, warm, dry, no cyanosis  NEURO: face symmetric, no asterixis     Labs:   All labs reviewed by me  Electrolytes/Renal - Recent Labs   Lab Test 12/18/22  0610 12/17/22  2112 12/17/22  0744 12/17/22  0114 12/16/22  1652 11/22/22  0848   *  --  130*  --  130* 142   POTASSIUM 5.2 5.5* 5.6*   < > 5.7* 3.6   CHLORIDE 98  --  95*  --  94* 97*   CO2 18*  --  13*  --  19* 31*   .0*  --  106.0*  --  104.0* 69.4*   CR 6.27*  --  6.37*  --  6.50* 4.52*   *  --  146*  --  109* 103*   NAZARIO 8.6*  --  8.6*  --  9.0 9.4   MAG  --   --   --   --  2.3  --    PHOS  --   --   --   --  6.3* 4.4    < > = values in this interval not displayed.       CBC -   Recent Labs   Lab Test 12/18/22  0610 12/17/22  0744 12/16/22  1652   WBC 13.8* 12.2* 18.2*   HGB 11.1* 12.1* 12.2*    218 215       LFTs -   Recent Labs   Lab Test 12/16/22  1652 11/22/22  0848   ALKPHOS 227* 372*   BILITOTAL 0.9 1.3*   ALT 34 91*   AST 47 91*   PROTTOTAL 6.2* 5.8*   ALBUMIN 3.2* 3.1*       Iron Panel -   Recent Labs   Lab Test 11/22/22  0848   IRON 68   IRONSAT 31   HOLA 429*         Imaging:  All imaging studies reviewed by me.     Current Medications:    calcium carbonate-vitamin D  1 tablet Oral BID     diltiazem ER COATED BEADS  120 mg Oral Daily     folic acid  5 mg Oral Daily     lactulose  20 g Oral TID     pantoprazole  40 mg Oral Daily     predniSONE  40 mg Oral Daily     rifaximin  550 mg Oral BID     sodium chloride (PF)  3 mL Intracatheter Q8H     sodium chloride (PF)  3 mL Intracatheter Q8H     sodium zirconium cyclosilicate  10 g Oral TID     vancomycin  125 mg Oral 4x Daily     zinc  sulfate  220 mg Oral Daily       Sandro Willett MD  Division of Renal Disease and Hypertension  [facility]  [employer] Web Console

## 2022-12-18 NOTE — PLAN OF CARE
Admission/Transfer from: ED  2 RN skin assessment completed. YES. Misha RN and Laine RN.  Significant findings include: Bony prominence on right heel, mepilex applied. Generalized bruising on bilateral forearms. Skin tear on left forearm covered with guaze and tape.  [facility] Nurse Consult Ordered? NO.

## 2022-12-18 NOTE — PLAN OF CARE
Assumed cares [id number]. AxOx4. VSS on RA except hypertensive. Up ad ari. Denied pain or nausea. Bmx 1 this shift -- requested lactulose be resumed d/t only 2 small Bms today -- 2000 dose given early per pt request. 1L LR bolus infusing at 100mL/hr. Hayley started this shift to lower K. On regular diet and tolerating well. No acute events this shift.     Goal Outcome Evaluation:      Plan of Care Reviewed With: patient    Overall Patient Progress: no changeOverall Patient Progress: no change    Outcome Evaluation: 1L bolus infusing at 100mL/hr. Hayley started this shift. Lactulose resumed per pt request d/t only 2 small BMs today.

## 2022-12-18 NOTE — PLAN OF CARE
Goal Outcome Evaluation:      Plan of Care Reviewed With: patient    Overall Patient Progress: no changeOverall Patient Progress: no change    Outcome Evaluation: A&Ox4, VSS, except intermittent HTN, on RA. Up ind in room. Denies pain. LR bolus completed and piv saline locked. 4Eyes completed. Tele showing Afib. Pt slept well between cares.

## 2022-12-18 NOTE — PLAN OF CARE
Goal Outcome Evaluation:      Plan of Care Reviewed With: patient    Overall Patient Progress: improvingOverall Patient Progress: improving      A&OX4, VSs, on RA. Tele Discontinued. Up in room Independent. No c/o pain. PIV infusing with LR @ 100 mL/hr. Took lactulose am lactulose. Refused 2 pm lactulose. 2 BM this shift. No acute changes this shift. Plan of care continued.

## 2022-12-18 NOTE — PROGRESS NOTES
[facility]    Medicine Progress Note - Hospitalist Service, GOLD TEAM 11    Date of Admission:  12/16/2022    Assessment & Plan      Damion Quinones is a 67 year old man admitted on 12/16/2022. He has a history of alcoholic cirrhosis c/b hepatic encephalopathy, esophageal varices, TIPS procedure as well as paroxysmal atrial fibrillation, CKD, psoriatic arthritis, hypertension and ANCA vasculitis who is admitted with PATI in the setting of CKD.    Changes Today:  - Continue oral vancomycin 125mg QID  - Discontinue tele  - BID BMP  - Monitor stool output. Will give another 1L crystalloid today to keep up with loses. Additional PRN.    # PATI on CKD - improving  Presents with Cr worsening from 4.52 one month prior to 6.5 on day of admission in the setting of three days of frequent watery diarrhea, found to have C diff. Likely pre-renal in the setting of diarrhea and continued diuretic and lactulose use (lactulose was held for 1 day), but could be ATN. His renal biopsy on 9/9/2022 was read as suspicious for secondary IgA nephropathy but could not exclude un-sampled vasculitis. Cr slowly improving with gentle hydration.   - Nephrology consult  - Hold bumex and spironolactone  - Additional 1L crystalloid today to keep up with stool loses  - BID BMP  - Resumed lactuose per pt preference. He will not take if he is having significant stools.  - No urgent indication for dialysis currently    # Cdiff colitis  Watery, frequent and ongoing x3 days.  On antibiotics 2 weeks prior. Cdiff positive. Enteric panel negative.  - Continue vancomycin 125mg QID  - Restarted lactulose as diarrhea slowed down and pt wanting to prevent HE.  - Holding diuretics    # Hyperkalemia secondary to PATI on CKD  Slightly up at 5.7. Improved with shifting, but then rising again. Now stable on lokelma  - Continue lokelma 10mg TID for 2 days    # Hyponatremia  Mildly low and presumably secondary to hypovolemia  although cirrhosis could be playing a role. Improved with rehydration.  - Montior    # ESLD  Meld-NA 26 today which is worsened compared to prior.  Prior complications include varices w/ recent emergent TIPS, encephalopathy and ascites.  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  He is undergoing liver/kidney transplant evaluation and has his cardiology visit planned for 12/27.  - Consult hepatology  - Resume lactulose, hold if >3 stools.  - Holding bumex/spironolactone  - Continue home rifaximin    # Gout - Presents with swollen and tender R 2nd knuckle swelling.  - Continue prednisone to 40 mg qday.    # History of psoriatic arthritis, ANCA vasculitis  - Hold home prednisone:  Currently on prednisone 5 mg.  Patient plans to lower this to 2.5 mg next week per his rheumatologist.  - Start 40 mg prednisone for gout flare as above         Diet: Combination Diet Regular Diet Adult    DVT Prophylaxis: Pneumatic Compression Devices  Lux Catheter: Not present  Central Lines: None  Cardiac Monitoring: ACTIVE order. Indication: Electrolyte Imbalance (24 hours)- Magnesium <1.3 mg/ml; Potassium < =2.8 or > 5.5 mg/ml  Code Status: Full Code      Disposition Plan     Expected Discharge Date: 12/18/2022                The patient's care was discussed with the Bedside Nurse, Patient and Renal Consultant.    Lolis Agrawal MD  Hospitalist Service, GOLD TEAM [address] [place] [address]  Securely message with the Vocera Web Console (learn more here)  Text page via [facility] Paging/Directory   Please see signed in provider for up to date coverage information      Clinically Significant Risk Factors        # Hyperkalemia: Highest K = 5.7 mmol/L in last 2 days, will monitor as appropriate  # Hyponatremia: Lowest Na = 130 mmol/L in last 2 days, will monitor as appropriate     # Anion Gap Metabolic Acidosis: Highest Anion Gap = 22 mmol/L in last 2 days, will monitor and  "treat as appropriate  # Hypoalbuminemia: Lowest albumin = 3.2 g/dL at 12/16/2022  4:52 PM, will monitor as appropriate    # Acute Kidney Injury, unspecified: based on a >150% or 0.3 mg/dL increase in last creatinine compared to past 90 day average, will monitor renal function         # Obesity: Estimated body mass index is 33.5 kg/m  as calculated from the following:    Height as of this encounter: 1.727 m (5' 8\").    Weight as of this encounter: 99.9 kg (220 lb 4.8 oz)., PRESENT ON ADMISSION         ______________________________________________________________________    Interval History    No acute events overnight. Feeling overall well today. Diarrhea started again overnight with ~5 episodes by mid-morning. Loose/watery. Denies abdominal pain or nausea. Good PO intake.    4 point ROS (CV, GI, Resp, Constitutional) negative except as noted above.    Data reviewed today: I reviewed all medications, new labs and imaging results over the last 24 hours. I personally reviewed no images or EKG's today.    Physical Exam   Vital Signs: Temp: 98.4  F (36.9  C) Temp src: Oral BP: 134/72 Pulse: 81   Resp: 18 SpO2: 98 % O2 Device: None (Room air)    Weight: 220 lbs 4.8 oz    General Appearance: NAD. Lying comfortably in bed.  Respiratory: CTAB. No increased WOB.  Cardiovascular: RRR. No m/r/g. Trace edema bilaterally.  GI: Soft. Non-tender. Non-distended.  Skin: No rash.  Other: AOx4. Moving all extremities. Normal affect      Data   Recent Labs   Lab 12/18/22  0610 12/17/22  2112 12/17/22  0744 12/17/22  0114 12/16/22  1652   WBC 13.8*  --  12.2*  --  18.2*   HGB 11.1*  --  12.1*  --  12.2*   MCV 92  --  92  --  90     --  218  --  215   INR  --   --   --   --  1.20*   *  --  130*  --  130*   POTASSIUM 5.2 5.5* 5.6*   < > 5.7*   CHLORIDE 98  --  95*  --  94*   CO2 18*  --  13*  --  19*   .0*  --  106.0*  --  104.0*   CR 6.27*  --  6.37*  --  6.50*   ANIONGAP 17*  --  22*  --  17*   NAZARIO 8.6*  --  8.6*  -- "  9.0   *  --  146*  --  109*   ALBUMIN  --   --   --   --  3.2*   PROTTOTAL  --   --   --   --  6.2*   BILITOTAL  --   --   --   --  0.9   ALKPHOS  --   --   --   --  227*   ALT  --   --   --   --  34   AST  --   --   --   --  47   LIPASE  --   --   --   --  85*    < > = values in this interval not displayed.     No results found for this or any previous visit (from the past 24 hour(s)).

## 2022-12-19 ENCOUNTER — TELEPHONE (OUTPATIENT)
Dept: TRANSPLANT | Facility: CLINIC | Age: 67
End: 2022-12-19

## 2022-12-19 DIAGNOSIS — K70.30 ALCOHOLIC CIRRHOSIS (H): Primary | ICD-10-CM

## 2022-12-19 LAB
ALBUMIN SERPL BCG-MCNC: 2.6 G/DL (ref 3.5–5.2)
ALP SERPL-CCNC: 208 U/L (ref 40–129)
ALT SERPL W P-5'-P-CCNC: 41 U/L (ref 10–50)
ANION GAP SERPL CALCULATED.3IONS-SCNC: 16 MMOL/L (ref 7–15)
ANION GAP SERPL CALCULATED.3IONS-SCNC: 17 MMOL/L (ref 7–15)
ANION GAP SERPL CALCULATED.3IONS-SCNC: 19 MMOL/L (ref 7–15)
AST SERPL W P-5'-P-CCNC: 54 U/L (ref 10–50)
BASE EXCESS BLDV CALC-SCNC: -1.5 MMOL/L (ref -7.7–1.9)
BILIRUB DIRECT SERPL-MCNC: 0.31 MG/DL (ref 0–0.3)
BILIRUB SERPL-MCNC: 0.6 MG/DL
BUN SERPL-MCNC: 105 MG/DL (ref 8–23)
BUN SERPL-MCNC: 107 MG/DL (ref 8–23)
BUN SERPL-MCNC: 98 MG/DL (ref 8–23)
CALCIUM SERPL-MCNC: 8.3 MG/DL (ref 8.8–10.2)
CALCIUM SERPL-MCNC: 8.8 MG/DL (ref 8.8–10.2)
CALCIUM SERPL-MCNC: 8.8 MG/DL (ref 8.8–10.2)
CHLORIDE SERPL-SCNC: 100 MMOL/L (ref 98–107)
CHLORIDE SERPL-SCNC: 97 MMOL/L (ref 98–107)
CHLORIDE SERPL-SCNC: 98 MMOL/L (ref 98–107)
CREAT SERPL-MCNC: 5.34 MG/DL (ref 0.67–1.17)
CREAT SERPL-MCNC: 5.91 MG/DL (ref 0.67–1.17)
CREAT SERPL-MCNC: 5.96 MG/DL (ref 0.67–1.17)
DEPRECATED HCO3 PLAS-SCNC: 18 MMOL/L (ref 22–29)
DEPRECATED HCO3 PLAS-SCNC: 19 MMOL/L (ref 22–29)
DEPRECATED HCO3 PLAS-SCNC: 20 MMOL/L (ref 22–29)
ERYTHROCYTE [DISTWIDTH] IN BLOOD BY AUTOMATED COUNT: 14.5 % (ref 10–15)
GFR SERPL CREATININE-BSD FRML MDRD: 10 ML/MIN/1.73M2
GFR SERPL CREATININE-BSD FRML MDRD: 10 ML/MIN/1.73M2
GFR SERPL CREATININE-BSD FRML MDRD: 11 ML/MIN/1.73M2
GLUCOSE SERPL-MCNC: 107 MG/DL (ref 70–99)
GLUCOSE SERPL-MCNC: 157 MG/DL (ref 70–99)
GLUCOSE SERPL-MCNC: 99 MG/DL (ref 70–99)
HCO3 BLDV-SCNC: 22 MMOL/L (ref 21–28)
HCT VFR BLD AUTO: 33.3 % (ref 40–53)
HGB BLD-MCNC: 11 G/DL (ref 13.3–17.7)
HOLD SPECIMEN: NORMAL
MCH RBC QN AUTO: 29.8 PG (ref 26.5–33)
MCHC RBC AUTO-ENTMCNC: 33 G/DL (ref 31.5–36.5)
MCV RBC AUTO: 90 FL (ref 78–100)
O2/TOTAL GAS SETTING VFR VENT: 21 %
PCO2 BLDV: 34 MM HG (ref 40–50)
PH BLDV: 7.42 [PH] (ref 7.32–7.43)
PLATELET # BLD AUTO: 203 10E3/UL (ref 150–450)
PO2 BLDV: 34 MM HG (ref 25–47)
POTASSIUM SERPL-SCNC: 3.7 MMOL/L (ref 3.4–5.3)
POTASSIUM SERPL-SCNC: 4 MMOL/L (ref 3.4–5.3)
POTASSIUM SERPL-SCNC: 4.3 MMOL/L (ref 3.4–5.3)
PROT SERPL-MCNC: 5.1 G/DL (ref 6.4–8.3)
RBC # BLD AUTO: 3.69 10E6/UL (ref 4.4–5.9)
SODIUM SERPL-SCNC: 132 MMOL/L (ref 136–145)
SODIUM SERPL-SCNC: 135 MMOL/L (ref 136–145)
SODIUM SERPL-SCNC: 137 MMOL/L (ref 136–145)
WBC # BLD AUTO: 13.9 10E3/UL (ref 4–11)

## 2022-12-19 PROCEDURE — 99233 SBSQ HOSP IP/OBS HIGH 50: CPT | Mod: GC | Performed by: INTERNAL MEDICINE

## 2022-12-19 PROCEDURE — 80053 COMPREHEN METABOLIC PANEL: CPT | Performed by: STUDENT IN AN ORGANIZED HEALTH CARE EDUCATION/TRAINING PROGRAM

## 2022-12-19 PROCEDURE — 250N000012 HC RX MED GY IP 250 OP 636 PS 637: Performed by: NURSE PRACTITIONER

## 2022-12-19 PROCEDURE — 82248 BILIRUBIN DIRECT: CPT | Performed by: HOSPITALIST

## 2022-12-19 PROCEDURE — 258N000003 HC RX IP 258 OP 636: Performed by: STUDENT IN AN ORGANIZED HEALTH CARE EDUCATION/TRAINING PROGRAM

## 2022-12-19 PROCEDURE — 82803 BLOOD GASES ANY COMBINATION: CPT | Performed by: STUDENT IN AN ORGANIZED HEALTH CARE EDUCATION/TRAINING PROGRAM

## 2022-12-19 PROCEDURE — 250N000013 HC RX MED GY IP 250 OP 250 PS 637: Performed by: STUDENT IN AN ORGANIZED HEALTH CARE EDUCATION/TRAINING PROGRAM

## 2022-12-19 PROCEDURE — 36415 COLL VENOUS BLD VENIPUNCTURE: CPT | Performed by: STUDENT IN AN ORGANIZED HEALTH CARE EDUCATION/TRAINING PROGRAM

## 2022-12-19 PROCEDURE — 85027 COMPLETE CBC AUTOMATED: CPT | Performed by: STUDENT IN AN ORGANIZED HEALTH CARE EDUCATION/TRAINING PROGRAM

## 2022-12-19 PROCEDURE — 99223 1ST HOSP IP/OBS HIGH 75: CPT | Mod: GC | Performed by: INTERNAL MEDICINE

## 2022-12-19 PROCEDURE — 99232 SBSQ HOSP IP/OBS MODERATE 35: CPT | Performed by: STUDENT IN AN ORGANIZED HEALTH CARE EDUCATION/TRAINING PROGRAM

## 2022-12-19 PROCEDURE — 120N000002 HC R&B MED SURG/OB UMMC

## 2022-12-19 PROCEDURE — 250N000013 HC RX MED GY IP 250 OP 250 PS 637: Performed by: NURSE PRACTITIONER

## 2022-12-19 RX ADMIN — VANCOMYCIN HYDROCHLORIDE 125 MG: 125 CAPSULE ORAL at 19:35

## 2022-12-19 RX ADMIN — SODIUM ZIRCONIUM CYCLOSILICATE 10 G: 10 POWDER, FOR SUSPENSION ORAL at 10:45

## 2022-12-19 RX ADMIN — PANTOPRAZOLE SODIUM 40 MG: 40 TABLET, DELAYED RELEASE ORAL at 08:12

## 2022-12-19 RX ADMIN — DILTIAZEM HYDROCHLORIDE 120 MG: 120 CAPSULE, COATED, EXTENDED RELEASE ORAL at 08:12

## 2022-12-19 RX ADMIN — VANCOMYCIN HYDROCHLORIDE 125 MG: 125 CAPSULE ORAL at 08:12

## 2022-12-19 RX ADMIN — PREDNISONE 40 MG: 20 TABLET ORAL at 08:12

## 2022-12-19 RX ADMIN — FOLIC ACID 5 MG: 1 TABLET ORAL at 08:12

## 2022-12-19 RX ADMIN — RIFAXIMIN 550 MG: 550 TABLET ORAL at 19:35

## 2022-12-19 RX ADMIN — RIFAXIMIN 550 MG: 550 TABLET ORAL at 08:12

## 2022-12-19 RX ADMIN — VANCOMYCIN HYDROCHLORIDE 125 MG: 125 CAPSULE ORAL at 11:39

## 2022-12-19 RX ADMIN — VANCOMYCIN HYDROCHLORIDE 125 MG: 125 CAPSULE ORAL at 16:06

## 2022-12-19 RX ADMIN — Medication 1 TABLET: at 19:35

## 2022-12-19 RX ADMIN — ZINC SULFATE 220 MG (50 MG) CAPSULE 220 MG: CAPSULE at 08:12

## 2022-12-19 RX ADMIN — Medication 1 TABLET: at 08:12

## 2022-12-19 RX ADMIN — SODIUM CHLORIDE, POTASSIUM CHLORIDE, SODIUM LACTATE AND CALCIUM CHLORIDE 1000 ML: 600; 310; 30; 20 INJECTION, SOLUTION INTRAVENOUS at 11:39

## 2022-12-19 ASSESSMENT — ACTIVITIES OF DAILY LIVING (ADL)
ADLS_ACUITY_SCORE: 18
DEPENDENT_IADLS:: INDEPENDENT

## 2022-12-19 NOTE — PROGRESS NOTES
[facility]    Medicine Progress Note - Hospitalist Service, GOLD TEAM 11    Date of Admission:  12/16/2022    Assessment & Plan      Damion Quinones is a 67 year old man admitted on 12/16/2022. He has a history of alcoholic cirrhosis c/b hepatic encephalopathy, esophageal varices, TIPS procedure as well as paroxysmal atrial fibrillation, CKD, psoriatic arthritis, hypertension and ANCA vasculitis who is admitted with PATI in the setting of CKD.    Changes Today:  - Continue oral vancomycin 125mg QID  - BID BMP  - Monitor stool output, becoming more solid. Will give another 1L crystalloid today to keep up with loses. Additional PRN.  - Rheumatology consult due to lack of improvement of R 2nd MCP swelling/tenderness.  - Per nephrology, if Cr continues to improve/stabilize over next 1-2 days ok to discharge with close nephrology follow up and labs.    # PATI on CKD - improving  Presents with Cr worsening from 4.52 one month prior to 6.5 on day of admission in the setting of three days of frequent watery diarrhea, found to have C diff. Likely pre-renal in the setting of diarrhea and continued diuretic and lactulose use (lactulose was held for 1 day), but could be ATN. His renal biopsy on 9/9/2022 was read as suspicious for secondary IgA nephropathy but could not exclude un-sampled vasculitis. Cr slowly improving with gentle hydration.   - Nephrology consult  - Hold bumex and spironolactone  - Additional 1L crystalloid today to keep up with stool loses  - BID BMP  - Resumed lactuose per pt preference. He will not take if he is having significant stools.  - No urgent indication for dialysis currently    # Cdiff colitis  Watery, frequent and ongoing x3 days.  On antibiotics 2 weeks prior. Cdiff positive. Enteric panel negative.  - Continue vancomycin 125mg QID  - Restarted lactulose as diarrhea slowed down and pt wanting to prevent HE.  - Holding diuretics    # Hyperkalemia  secondary to PATI on CKD  Slightly up at 5.7. Improved with shifting, but then rising again. No stable.  - Completed lokelma 10mg TID for 2 days (end 12/18).    # Hyponatremia - resolved  Mildly low and presumably secondary to hypovolemia although cirrhosis could be playing a role. Improved with rehydration.  - Montior    # ESLD  Meld-NA 26 today which is worsened compared to prior.  Prior complications include varices w/ recent emergent TIPS, encephalopathy and ascites.  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  He is undergoing liver/kidney transplant evaluation and has his cardiology visit planned for 12/27.  - Consult hepatology  - Resume lactulose, hold if >3 stools.  - Holding bumex/spironolactone  - Continue home rifaximin    # R 2nd MCP Gout   Presents with swollen and tender R 2nd knuckle swelling.  - Continue prednisone to 40 mg qday  - Rheumatology consult    # History of psoriatic arthritis, ANCA vasculitis  - Hold home prednisone:  Currently on prednisone 5 mg.  Patient plans to lower this to 2.5 mg next week per his rheumatologist.  - Start 40 mg prednisone for gout flare as above         Diet: Combination Diet Regular Diet Adult    DVT Prophylaxis: Pneumatic Compression Devices  Lux Catheter: Not present  Central Lines: None  Cardiac Monitoring: None  Code Status: Full Code      Disposition Plan     Expected Discharge Date: 12/21/2022        Discharge Comments: PATI on CKD, borderline needing dialysis. Also with Cdiff.        The patient's care was discussed with the Bedside Nurse, Patient and Renal Consultant.    Lolis Agrawal MD  Hospitalist Service, GOLD TEAM [address] [place] [address]  Securely message with the Vocera Web Console (learn more here)  Text page via [facility] Paging/Directory   Please see signed in provider for up to date coverage information      Clinically Significant Risk Factors        # Hyperkalemia: Highest K =  "5.5 mmol/L in last 2 days, will monitor as appropriate  # Hyponatremia: Lowest Na = 131 mmol/L in last 2 days, will monitor as appropriate      # Hypoalbuminemia: Lowest albumin = 3.2 g/dL at 12/16/2022  4:52 PM, will monitor as appropriate            # Obesity: Estimated body mass index is 33.5 kg/m  as calculated from the following:    Height as of this encounter: 1.727 m (5' 8\").    Weight as of this encounter: 99.9 kg (220 lb 4.8 oz)., PRESENT ON ADMISSION         ______________________________________________________________________    Interval History    No acute events overnight. Feeling well this morning. Still having >5-10 BM per day, but starting to be more solid. Did not take lactulose this morning. Denies abdominal pain. Starting to notice a little LE swelling.    4 point ROS (CV, GI, Resp, Constitutional) negative except as noted above.    Data reviewed today: I reviewed all medications, new labs and imaging results over the last 24 hours. I personally reviewed no images or EKG's today.    Physical Exam   Vital Signs: Temp: 99.4  F (37.4  C) Temp src: Oral BP: 136/72 Pulse: 70   Resp: 18 SpO2: 100 % O2 Device: None (Room air)    Weight: 220 lbs 4.8 oz    General Appearance: NAD. Lying comfortably in bed.  Respiratory: CTAB. No increased WOB.  Cardiovascular: RRR. No m/r/g. Trace edema bilaterally.  GI: Soft. Non-tender. Non-distended.  Skin: No rash.  Other: AOx4. Moving all extremities. Normal affect      Data   Recent Labs   Lab 12/19/22  0942 12/19/22  0545 12/19/22  0530 12/18/22  2100 12/18/22  0610 12/17/22  2112 12/17/22  0744 12/17/22  0114 12/16/22  1652   WBC  --  13.9*  --   --  13.8*  --  12.2*  --  18.2*   HGB  --  11.0*  --   --  11.1*  --  12.1*  --  12.2*   MCV  --  90  --   --  92  --  92  --  90   PLT  --  203  --   --  209  --  218  --  215   INR  --   --   --   --   --   --   --   --  1.20*     --  135* 131* 133*  --  130*  --  130*   POTASSIUM 3.7  --  4.3 4.6 5.2   < > 5.6*   " < > 5.7*   CHLORIDE 98  --  100 95* 98  --  95*  --  94*   CO2 20*  --  19* 19* 18*  --  13*  --  19*   .0*  --  107.0* 104.0* 109.0*  --  106.0*  --  104.0*   CR 5.91*  --  5.96* 5.81* 6.27*  --  6.37*  --  6.50*   ANIONGAP 19*  --  16* 17* 17*  --  22*  --  17*   NAZARIO 8.8  --  8.8 8.7* 8.6*  --  8.6*  --  9.0   GLC 99  --  107* 137* 121*  --  146*  --  109*   ALBUMIN  --   --  2.6*  --   --   --   --   --  3.2*   PROTTOTAL  --   --  5.1*  --   --   --   --   --  6.2*   BILITOTAL  --   --  0.6  --   --   --   --   --  0.9   ALKPHOS  --   --  208*  --   --   --   --   --  227*   ALT  --   --  41  --   --   --   --   --  34   AST  --   --  54*  --   --   --   --   --  47   LIPASE  --   --   --   --   --   --   --   --  85*    < > = values in this interval not displayed.     No results found for this or any previous visit (from the past 24 hour(s)).

## 2022-12-19 NOTE — PROGRESS NOTES
GI Progress Note  Date of Service:  12/19/2022      Assessment:   67 year old male with PMHx of decompensated alcohol + hemochromatosis (homozygous H63D) cirrhosis complicated by variceal bleeding s/p TIPS (10/1/2022) and hepatic encephalopathy. PMHx also includes alcohol overuse, CKD (IgA nephropathy + ANCA vasculitis), psoriatic arthritis, atrial fibrillation and HTN. He is admitted with diarrhea and worsening renal function, and found to be C diff positive.     # Severe C diff colitis  Stool consistency and frequency have been improving compared to presentation.     # Acute kidney injury on CKD  Cr 4.5 11/22,  6.5 on  [id number],   Seen by nephrology, urine sodium < 20 , started on albumin infusions as per nephrology slight improvement in Cr to 5.91  Has evidence of glomerulosclerosis on kidney biopsy from 9/2022     #. Decompensated Cirrhosis 2/2 alcohol +/- hemochromatosis, MELD Na 26  Etiology: Alcohol and hemochromatosis (homozygous H63D)   Ascites : None   HE : Developed after TIPS, on rifaxamin, lactulose on hold due to diarrhea, no evidence of HE on exam today  hx variceal bleed: s/p TIPS (10/1/2022), no recurrent bleeding  last EGD 9/2022: Small EV, IGV1 oozing, portal HTN gastropathy, IGV2 - s/p TIPS 10/1/2022  HCC screening US 11/2022 without focal liver lesion  PATI: See above   HCC surveillance: US Abd 11/22/2022 No HCC, patent TIPS   Infective work up: UA negative, Xray chest negative for any infiltrate, C diff positive (see above)   Outpatient hepatologist : Dr. Bermudez/Dr. Poole     Liver transplant candidacy: Under Liver and kidney evaluation as an outpatient, was seen by transplant hepatology, nephrology and transplant surgery as an outpatient. Will need cardiology, sleep clinic    Recommendations:   - Continue p.o. vancomycin 125 mg 4 times daily for 10 days  - Would hold lactulose for now until diarrhea resolves with vancomycin, then can restart  - Continue rifaximin and zinc  - Appreciate  "nephrology reccomendations, on albumin challenge, lokemia for       hyperkalemia, diuretics on hold. Discussed with nephrology, no indication for dialysis at this point.   -  Awaiting echocardiogram, will consult cardiology eval as an inpatient considering worsening MELD Na trajectory.    -  We will continue to follow    Patient was discussed with Dr. Virgilio Mcnair MD   Fellow   Gastroenterology & Hepatology     __________________________________________________________________________________  Subjective:  Nursing notes reviewed and noted.  Pt reports that stools are more formed now, he had four bowel movements overnight as opposed to \"countless\" loose stools while he was at home  He has tolerated dinner and breakfast. Had a small volume emesis this AM after drinking a lot of water.       Physical Examination:  Vital Signs:  /75 (BP Location: Right arm)   Pulse 74   Temp 98.5  F (36.9  C) (Oral)   Resp 18   Ht 1.727 m (5' 8\")   Wt 99.9 kg (220 lb 4.8 oz)   SpO2 99%   BMI 33.50 kg/m       Gen: Pt in no acute distress  Eyes: Conjunctiva icteric  Heart: regular rate and rhtyhm  Lungs: Non labored breathing  Abdomen: Soft, mild to moderate distension  Neuro: A & O x 3  Extremities: 2-3 + edema bilateral lower extremities       DATA:  Labs:    Reviewed and noted            "

## 2022-12-19 NOTE — TELEPHONE ENCOUNTER
RECORDS RECEIVED FROM:   DATE RECEIVED:   NOTES STATUS DETAILS   OFFICE NOTE from referring provider    Internal Hospital f/u   OFFICE NOTE from other cardiologist    N/A    SUMMARY from hospital/ED   Internal 12-16-22 [facility]   MEDICATION LIST   Internal    DIAGNOSTIC PROCEDURES     EKG   Internal 12-16-22   Monitor Reports   N/A    IMAGING (DISC & REPORT)      Echo   Internal 11-10-22   Stress Tests   N/A    Cath   N/A    MRI/MRA   N/A    CT/CTA   In process 12-7-22     Action 12/19/22 [facility]  Fax #[phone]   Action Taken Requested CT Chest 12-7-22    Resolved in PACS 12/21     Action 12/19/22 [facility]  Fax #[phone]   Action Taken Requested EKGs 11-24-22, 10-1-22, 9-29-22, 11-9-21    Sent to scanning 12/21     Action 12/19/22 [facility]  Fax #[phone]   Action Taken Requested echo 9-25-22    Resolved in PACS 12/21

## 2022-12-19 NOTE — CONSULTS
[facility] Rheumatology IP Consult    Consult for: Concerns related to possible gout flare without resolution on moderately high-dose prednisone  Consult by: Dr. Lolis Agrawal    ASSESSMENT AND RECOMMENDATIONS:  Damion Quinones is a 67 year old male admitted on 12/16/2022. 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 difficile infection.    Mr. Quinones has unfortunately long and complicated history of rheumatic conditions. He recalls gout flares long before he established with Dr. Rucker in 2018 for treatment of psoriatic arthritis.  At initial presentation in 2018 he did not have evidence for tophaceous gout.  However, on examination today they are readily evident in multiple locations including the bilateral elbows, and markedly so on the right fifth finger.  As well, recent uric acid of 10.7 would prompt initiation of urate lowering therapy per guidelines.  This is complicated by his ongoing renal injury and baseline CKD.  Febuxostat may be an appropriate outpatient medication for consideration, or even Pegloticase.  Acutely, he is disinclined to start new chronic or acute medications for flare.  There is some lingering consideration that his previous psoriatic arthritis could be active and producing a monoarticular arthritis.  He has completely weaned off Humira and prior prednisone, and rituximab is not a therapy for psoriatic arthritis.  However, local treatment would be appropriate in both psoriatic arthritis and gouty arthritis.  As such, we will perform joint aspiration and corticosteroid injection tomorrow afternoon at bedside.    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    Decompensated cirrhosis    PATI on  "CKD    RECOMMENDATIONS:  -- Patient would like to pursue joint aspiration and corticosteroid injection of the R 2nd MCP, which will be performed tomorrow 12/20 in the afternoon  --After local injection may be able to decrease systemic steroid burden  -- Outpatient given his evidence of tophi, his rheumatologist should explore urate lowering therapy, possibly febuxostat or Pegloticase  -- Could consider initiation of IL-17i with secukinumab for management of psoriatic arthritis  --We will continue to follow    I discussed the findings and recommendations with the patient.  I communicated the assessment and plan to the consulting team.    Case seen and discussed with Dr. Manny Cuellar \"BJ\" MD Camilo, PhD  PGY-3 Rheumatology Fellow  [phone] [text page]    I saw this patient with the Rheumatology Fellow. I agree with the findings and recommendations. Patient has r 2nd MCP monoarticular synovitis/pain on a background of widespread tophi and psoriasis. Whether oligoarthritis is due to flaring gouty arthritis or psoriatic arthritis is not clear. Agree with withholding systemic anti-TNF treatment and focussing on local therapy for inflammatory arthritis while formulating plans for management of advance liver and renal disease. Long-term, presence of widespread tophi is an absolute indication for urate lowering therapy; psoriatic arthritis deserves specific treatment as well. These issues can be addressed by primary rheumatology Dr. Rucker.    Manny Ware M.D.  Staff Rheumatologist, [facility]  Pager [phone]      History of Present Illness   Damion Quinones is a 67 year old male admitted on 12/16/2022. 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    Rheumatology Synopsis:  Presentation:   History of polyarticular tophaceous gout.  Psoriasis (dx age 32) with " psoriatic arthritis treated with Humira (2018-10/2022).  And active concern for MPA with GN (+ MPO).  No flares of gout noted while undergoing treatment for psoriatic arthritis with Humira.  Recent rituximab given for ANCA associated vasculitis.  Serologies/Findings:   (+) MPO   Complications/Involvement:   Plaque psoriasis  Hereditary hemochromatosis  Decompensated cirrhosis - EtOH + hemochromatosis - varices s/p TIPS  CKD r/t suspected ANCA vasculitis  Current Therapy:    Rituximab: 1000 mg x2 (10/21 and 11/7)    Prednisone: Tapered from 80 mg to current 5 mg, started in September 2022  Previous Therapies:    Humira: Worked well for control of psoriatic arthritis, stopped with recent discovery of probable ANCA associated vasculitis    No prior urate lowering therapy    prior NSAIDs including Celebrex at first diagnosis  Medication Safety and Monitoring:   HepB (negative), HepC (negative), Quantiferon (negative)  DXA (none)    Mr. Quinones is admitted with diarrhea and worsening renal function, found to be C. difficile positive.  Renal injury (Cr 4.52 --> 6.5 ) felt to be an exacerbation of underlying CKD due to volume depletion from profuse watery diarrhea and continued use of PTA diuretics.  Started on albumin infusions per nephrology.    He also presented with a swollen right second MCP which he attributed to gout.  He has a long history of polyarticular gout that has not been active for many years until this current presentation.  He attributes this to treatment of his psoriatic arthritis with Humira, which was [facility] in [facility]  His treatment recently changed in the context of recently appreciated probable glomerulonephritis due to ANCA associated vasculitis.  He was transitioned from Humira to rituximab in September-October, and additionally started on high-dose protracted prednisone taper.  He is currently on 5 mg of prednisone.  Until this current  "manifestation of joint pain and swelling, he had not had significant concerns for active inflammatory arthritis in many months.  He has, however, noted increased plaque psoriasiform rash on the bilateral shins.  Upon arrival 12/16, given his swollen right second MCP, he was started on prednisone 40 mg daily with distinct improvement in the pain and swelling noted in his MCP, albeit without complete resolution.  He has no other affected joints.  He recalls previous flares of his \"gout\" in his MCPs, elbows, knees, and feet.    Review of Systems    Associated symptoms:arthralgia and nodules.   Denies: alopecia, memory loss, new headache, oral ulcers, palpitations and rashes/photosensitive.      Unless stated above, a 14-point review of systems was otherwise normal.     HISTORY REVIEW:  Past Medical History   Past Medical History:   Diagnosis Date     Alcoholic cirrhosis of liver with ascites (H) 10/11/2019     Arthritis     RA     Chronic kidney disease      Coronary artery disease     AFib 2016     Diabetes mellitus type 2, uncomplicated (H) 10/11/2019     History of blood transfusion     2016     History of hemochromatosis 10/11/2019     Hypertension      Hypokalemia      Obesity      PAF (paroxysmal atrial fibrillation) (H)      Psoriatic arthritis (H)      Past Surgical History:   Procedure Laterality Date     APPENDECTOMY      Removed at 18 Years Old      COLONOSCOPY      2014 at [facility].      EYE SURGERY Bilateral     Cataract     GI SURGERY      TIPS     HERNIA REPAIR      History of bilateral inguinal hernia repair: 10/28/2014. Open hernia repair: 10/2017. Abdominal wound exploration and debridement 12/27/2017     Family History   Problem Relation Age of Onset     Lung Cancer Mother      Colon Cancer Father      Lung Cancer Brother      Heart Failure Brother      Kidney Disease Brother      Social History     Socioeconomic History     Marital status:      Spouse name: Not on file     Number of " "children: Not on file     Years of education: Not on file     Highest education level: Not on file   Occupational History     Not on file   Tobacco Use     Smoking status: Never     Smokeless tobacco: Never   Substance and Sexual Activity     Alcohol use: Not Currently     Comment: Last ETOH use was 12/31/2021     Drug use: Not Currently     Sexual activity: Not on file   Other Topics Concern     Parent/sibling w/ CABG, MI or angioplasty before 65F 55M? Not Asked   Social History Narrative     Not on file     Social Determinants of Health     Financial Resource Strain: Not on file   Food Insecurity: Not on file   Transportation Needs: Not on file   Physical Activity: Not on file   Stress: Not on file   Social Connections: Not on file   Intimate Partner Violence: Not on file   Housing Stability: Not on file     Patient Active Problem List   Diagnosis     Alcoholic cirrhosis of liver with ascites (H)     History of hemochromatosis     Diabetes mellitus type 2, uncomplicated (H)     Obesity     Chronic kidney disease     Psoriatic arthritis (H)     PAF (paroxysmal atrial fibrillation) (H)     Hypokalemia     Morbid obesity (H)     Acute kidney injury superimposed on chronic kidney disease (H)     No Known Allergies    OBJECTIVE:  Physical Exam   PHYSICAL EXAM  /75 (BP Location: Right arm)   Pulse 74   Temp 98.5  F (36.9  C) (Oral)   Resp 18   Ht 1.727 m (5' 8\")   Wt 99.9 kg (220 lb 4.8 oz)   SpO2 99%   BMI 33.50 kg/m    Wt Readings from Last 4 Encounters:   12/17/22 99.9 kg (220 lb 4.8 oz)   11/29/22 102.1 kg (225 lb)   11/22/22 103 kg (227 lb 1.2 oz)   11/22/22 103 kg (227 lb 1.2 oz)     Physical Exam  Constitutional:       General: He is not in acute distress.     Appearance: He is obese.   HENT:      Head: Normocephalic and atraumatic.      Mouth/Throat:      Mouth: Mucous membranes are moist.   Eyes:      Pupils: Pupils are equal, round, and reactive to light.   Cardiovascular:      Rate and Rhythm: " Normal rate and regular rhythm.   Pulmonary:      Effort: Pulmonary effort is normal.      Breath sounds: Normal breath sounds.   Abdominal:      Palpations: Abdomen is soft.   Musculoskeletal:         General: Swelling (Right second MCP with 2 cm bulbous supra articular effusion) and deformity (Multiple palmar contractures in bilateral hands, scattered DIP and PIP deformities) present.      Cervical back: Normal range of motion and neck supple.      Right lower leg: Edema present.      Left lower leg: Edema present.   Skin:     General: Skin is warm and dry.      Findings: Rash present.      Comments: Multifocal tophi: Bilateral elbows, notable right fifth digit   Neurological:      Mental Status: He is alert.   Psychiatric:         Mood and Affect: Mood normal.         Data   Outside Records: YES rheumatology and nephrology treatment records from [facility]  Outside Xrays: YES  CBC:  Recent Labs   Lab Test 12/19/22  0545 12/18/22  0610 12/17/22  0744   WBC 13.9* 13.8* 12.2*   RBC 3.69* 3.67* 4.05*   HGB 11.0* 11.1* 12.1*   HCT 33.3* 33.7* 37.3*   MCV 90 92 92   MCH 29.8 30.2 29.9   MCHC 33.0 32.9 32.4   RDW 14.5 14.6 14.6    209 218       BMP:  Recent Labs   Lab Test 12/19/22  0942 12/19/22  0530 12/18/22  2100    135* 131*   POTASSIUM 3.7 4.3 4.6   CHLORIDE 98 100 95*   CO2 20* 19* 19*   ANIONGAP 19* 16* 17*   GLC 99 107* 137*   .0* 107.0* 104.0*   CR 5.91* 5.96* 5.81*   GFRESTIMATED 10* 10* 10*   NAZARIO 8.8 8.8 8.7*       LFT:  Recent Labs   Lab Test 12/19/22  0530 12/16/22  1652 11/22/22  0848   PROTTOTAL 5.1* 6.2* 5.8*   ALBUMIN 2.6* 3.2* 3.1*   BILITOTAL 0.6 0.9 1.3*   ALKPHOS 208* 227* 372*   AST 54* 47 91*   ALT 41 34 91*       No results found for: CKTOTAL  TSH   Date Value Ref Range Status   12/16/2022 2.86 0.30 - 4.20 uIU/mL Final       Inflammatory markers  Lab Results   Component Value Date    CRP 77.70 12/16/2022     No results found for: SED  Ferritin   Date Value Ref Range  Status   11/22/2022 429 (H) 31 - 409 ng/mL Final       UA RESULTS:  Recent Labs   Lab Test 12/16/22  1917 11/22/22  0858   COLOR Light Yellow Light Yellow   APPEARANCE Clear Clear   URINEGLC Negative Negative   URINEBILI Negative Negative   URINEKETONE Negative Negative   SG 1.010 1.009   UBLD Negative Negative   URINEPH 5.5 6.0   PROTEIN Negative Negative   NITRITE Negative Negative   LEUKEST Negative Negative   RBCU <1  --    WBCU <1  --      US Renal Complete Non-Vascular  Narrative: EXAMINATION: US RENAL COMPLETE NON-VASCULAR, 12/16/2022 6:05 PM     COMPARISON: Ultrasound 11/22/2022    HISTORY: PATI on CKD evaluation    TECHNIQUE: The kidneys and bladder were scanned in the standard  fashion with specialized ultrasound transducer(s) using both gray  scale and limited color/spectral Doppler techniques.    FINDINGS:    Right kidney: Measures 9.3 cm in length. Parenchyma is of normal  echogenicity. No focal mass. No hydronephrosis.    Left kidney: Measures 9.5 cm in length. Parenchyma is of normal  echogenicity. No focal mass. No hydronephrosis. Decreased size of the  hypoechoic subcapsular collection measuring 6.2 x 4.6 x 3.1 cm,  previously 7.5 x 4.9 x 2.6 cm.    Bladder: Moderately distended and within normal limits.  Impression: IMPRESSION:  1.  Decreased size of the left renal subcapsular collection,  presumably resolving subcapsular hematoma from prior renal biopsy.  2.  No hydronephrosis.    I have personally reviewed the examination and initial interpretation  and I agree with the findings.    MARK ALVAREZ MD         SYSTEM ID:  [id number]

## 2022-12-19 NOTE — PLAN OF CARE
Goal Outcome Evaluation:       A&O VSS on RA. PIV infusing slow bolus. Up walking halls today. BM x 4 today. Lokelma for hyperkalemia (monitoring). Reg diet, good appetite. Denies pain. Shower this shift. Calls to make needs known.

## 2022-12-19 NOTE — PLAN OF CARE
Goal Outcome Evaluation:      Plan of Care Reviewed With: patient    Overall Patient Progress: improvingOverall Patient Progress: improving    Outcome Evaluation: A&Ox4. VSS on room air. LR bolus again this AM. formed stools. denies pain.

## 2022-12-19 NOTE — PLAN OF CARE
Goal Outcome Evaluation:    Plan of Care Reviewed With: patient  Overall Patient Progress: improving    Outcome Evaluation: AOx4. Tele discontinued. VSS, intermittent HTN. Bandage to left arm changed. LR bolus complete and PIV saline locked. Denies pain. Calls appropriately. Patient reports 4 soft bowel movements overnight.

## 2022-12-19 NOTE — CONSULTS
Care Management Initial Consult    General Information  Assessment completed with: Patient, VM-chart review       Primary Care Provider verified and updated as needed:   NA  Readmission within the last 30 days:   none        Advance Care Planning: Advance Care Planning Reviewed: no concerns identified          Communication Assessment  Patient's communication style: spoken language (English or Bilingual)    Hearing Difficulty or Deaf: no   Wear Glasses or Blind: no    Cognitive  Cognitive/Neuro/Behavioral: WDL                      Living Environment:   People in home: spouse     Current living Arrangements: house      Able to return to prior arrangements:         Family/Social Support:  Care provided by: self  Provides care for: no one  Marital Status:   Wife          Description of Support System: Supportive, Involved         Current Resources:   Patient receiving home care services:  NA     Community Resources: none   Equipment currently used at home: cane, quad  Supplies currently used at home:      Employment/Financial:  Employment Status: retired        Lifestyle & Psychosocial Needs:  Social Determinants of Health     Tobacco Use: Low Risk      Smoking Tobacco Use: Never     Smokeless Tobacco Use: Never     Passive Exposure: Not on file   Alcohol Use: Not on file   Financial Resource Strain: Not on file   Food Insecurity: Not on file   Transportation Needs: Not on file   Physical Activity: Not on file   Stress: Not on file   Social Connections: Not on file   Intimate Partner Violence: Not on file   Depression: Not at risk     PHQ-2 Score: 0   Housing Stability: Not on file       Functional Status:  Prior to admission patient needed assistance:   Dependent ADLs:: Independent with minimal help  Dependent IADLs:: Independent with minimal help       Mental Health Status:  Mental Health Status: No Current Concerns       Values/Beliefs:  Spiritual, Cultural Beliefs, Anglican Practices, Values that affect  "care: no             Back ground: Damion Quinones is a 67 year old man admitted on 12/16/2022. He has a history of alcoholic cirrhosis c/b hepatic encephalopathy, esophageal varices, TIPS procedure as well as paroxysmal atrial fibrillation, CKD, psoriatic arthritis, hypertension and ANCA vasculitis who is admitted with PATI in the the setting of CKD.    Additional Information:  Pt status reviewed in chart. Spoke with pt via phone. Pt stating \"feeling well\" and hope ready for discharge tomorrow. Wife Apoorva will provide ride and no need support at discharge. RNCC remains available to support as any issues arise.    Alysha Johnson RN  5A RN Care Coordinator  Phone: [phone]  Pager: [phone]     For Weekend & Holiday on call RN Care Coordinator:  (Tasks: Home care, home infusion, medical equipment/oxygen, transportation, IMM & MOON forms, etc.)     Text Paging in [employer] Smart [employer] is the preferred method of contact for these teams     [place] [facility] [place] ([phone]) Saturday & Sunday; ([phone]) [facility] Recognized Holidays  Pager: [phone] Units: 4A, 4C, 4E, 5A & 5B      For Weekend & Holiday on call Social Work:  (Tasks: TCU, transportation, Hospice, adjustment to illness counseling, Health Care Directives, Child Protection and Domestic Violence concerns, Vulnerable Adult, IMM forms, etc.)     Text Paging in [employer] Smart Web is the preferred method of contact for these teams    [place] (0800 - 1630) Saturday and Sunday  Pager: [phone] Units: 4A, 4C, 4E  Pager: [phone] Units: 5A & 5B  _____________________________________________     After hours ([phone]) for all units everyday- (only the  is available after hours until midnight)  Pager [phone]      "

## 2022-12-19 NOTE — TELEPHONE ENCOUNTER
Spoke with spouse Jean Paul, also spoke with patient    Needs nutrition, should see while in clinic 12/27 as well as inpatient consult requested     Needs Neurology neuropathy work up, follow up on orders    Sleep study- schedulers reached out, message sent to ask them to reach out to patient/spouse again    Should get set up for PT if not set up by inpatient team, will set up after discharge.    Cardiac work up scheduled 12/27 (echo and cards consult), will re-assess if anything done while inpatient    PLEASE CALL SPOUSE JEANP AUL FOR APPOINTMENTS

## 2022-12-20 ENCOUNTER — COMMITTEE REVIEW (OUTPATIENT)
Dept: TRANSPLANT | Facility: CLINIC | Age: 67
End: 2022-12-20

## 2022-12-20 ENCOUNTER — APPOINTMENT (OUTPATIENT)
Dept: CARDIOLOGY | Facility: CLINIC | Age: 67
End: 2022-12-20
Payer: COMMERCIAL

## 2022-12-20 LAB
ALBUMIN SERPL BCG-MCNC: 2.8 G/DL (ref 3.5–5.2)
ALP SERPL-CCNC: 210 U/L (ref 40–129)
ALT SERPL W P-5'-P-CCNC: 46 U/L (ref 10–50)
ANION GAP SERPL CALCULATED.3IONS-SCNC: 14 MMOL/L (ref 7–15)
ANION GAP SERPL CALCULATED.3IONS-SCNC: 15 MMOL/L (ref 7–15)
AST SERPL W P-5'-P-CCNC: 59 U/L (ref 10–50)
BILIRUB DIRECT SERPL-MCNC: 0.35 MG/DL (ref 0–0.3)
BILIRUB SERPL-MCNC: 0.7 MG/DL
BUN SERPL-MCNC: 94.4 MG/DL (ref 8–23)
BUN SERPL-MCNC: 97.6 MG/DL (ref 8–23)
CALCIUM SERPL-MCNC: 8.7 MG/DL (ref 8.8–10.2)
CALCIUM SERPL-MCNC: 8.8 MG/DL (ref 8.8–10.2)
CHLORIDE SERPL-SCNC: 100 MMOL/L (ref 98–107)
CHLORIDE SERPL-SCNC: 102 MMOL/L (ref 98–107)
CREAT SERPL-MCNC: 5.07 MG/DL (ref 0.67–1.17)
CREAT SERPL-MCNC: 5.44 MG/DL (ref 0.67–1.17)
DEPRECATED HCO3 PLAS-SCNC: 17 MMOL/L (ref 22–29)
DEPRECATED HCO3 PLAS-SCNC: 22 MMOL/L (ref 22–29)
ERYTHROCYTE [DISTWIDTH] IN BLOOD BY AUTOMATED COUNT: 14.6 % (ref 10–15)
GFR SERPL CREATININE-BSD FRML MDRD: 11 ML/MIN/1.73M2
GFR SERPL CREATININE-BSD FRML MDRD: 12 ML/MIN/1.73M2
GLUCOSE SERPL-MCNC: 155 MG/DL (ref 70–99)
GLUCOSE SERPL-MCNC: 85 MG/DL (ref 70–99)
HCT VFR BLD AUTO: 34.5 % (ref 40–53)
HGB BLD-MCNC: 11.1 G/DL (ref 13.3–17.7)
INR PPP: 1.2 (ref 0.85–1.15)
LVEF ECHO: NORMAL
MCH RBC QN AUTO: 29.6 PG (ref 26.5–33)
MCHC RBC AUTO-ENTMCNC: 32.2 G/DL (ref 31.5–36.5)
MCV RBC AUTO: 92 FL (ref 78–100)
PLATELET # BLD AUTO: 206 10E3/UL (ref 150–450)
POTASSIUM SERPL-SCNC: 4 MMOL/L (ref 3.4–5.3)
POTASSIUM SERPL-SCNC: 4.1 MMOL/L (ref 3.4–5.3)
PROT SERPL-MCNC: 5.4 G/DL (ref 6.4–8.3)
RBC # BLD AUTO: 3.75 10E6/UL (ref 4.4–5.9)
SODIUM SERPL-SCNC: 133 MMOL/L (ref 136–145)
SODIUM SERPL-SCNC: 137 MMOL/L (ref 136–145)
WBC # BLD AUTO: 14 10E3/UL (ref 4–11)

## 2022-12-20 PROCEDURE — 36415 COLL VENOUS BLD VENIPUNCTURE: CPT | Performed by: STUDENT IN AN ORGANIZED HEALTH CARE EDUCATION/TRAINING PROGRAM

## 2022-12-20 PROCEDURE — 99222 1ST HOSP IP/OBS MODERATE 55: CPT | Mod: 25 | Performed by: INTERNAL MEDICINE

## 2022-12-20 PROCEDURE — 82248 BILIRUBIN DIRECT: CPT | Performed by: HOSPITALIST

## 2022-12-20 PROCEDURE — 999N000147 HC STATISTIC PT IP EVAL DEFER

## 2022-12-20 PROCEDURE — 93306 TTE W/DOPPLER COMPLETE: CPT | Mod: 26 | Performed by: INTERNAL MEDICINE

## 2022-12-20 PROCEDURE — 250N000012 HC RX MED GY IP 250 OP 636 PS 637: Performed by: NURSE PRACTITIONER

## 2022-12-20 PROCEDURE — 93306 TTE W/DOPPLER COMPLETE: CPT

## 2022-12-20 PROCEDURE — 250N000013 HC RX MED GY IP 250 OP 250 PS 637: Performed by: NURSE PRACTITIONER

## 2022-12-20 PROCEDURE — 250N000013 HC RX MED GY IP 250 OP 250 PS 637: Performed by: STUDENT IN AN ORGANIZED HEALTH CARE EDUCATION/TRAINING PROGRAM

## 2022-12-20 PROCEDURE — 255N000002 HC RX 255 OP 636: Performed by: INTERNAL MEDICINE

## 2022-12-20 PROCEDURE — 120N000002 HC R&B MED SURG/OB UMMC

## 2022-12-20 PROCEDURE — 85027 COMPLETE CBC AUTOMATED: CPT | Performed by: STUDENT IN AN ORGANIZED HEALTH CARE EDUCATION/TRAINING PROGRAM

## 2022-12-20 PROCEDURE — 250N000009 HC RX 250: Performed by: INTERNAL MEDICINE

## 2022-12-20 PROCEDURE — 20604 DRAIN/INJ JOINT/BURSA W/US: CPT | Mod: GC | Performed by: INTERNAL MEDICINE

## 2022-12-20 PROCEDURE — 85610 PROTHROMBIN TIME: CPT | Performed by: HOSPITALIST

## 2022-12-20 PROCEDURE — 99232 SBSQ HOSP IP/OBS MODERATE 35: CPT | Performed by: STUDENT IN AN ORGANIZED HEALTH CARE EDUCATION/TRAINING PROGRAM

## 2022-12-20 PROCEDURE — 0R9U3ZX DRAINAGE OF RIGHT METACARPOPHALANGEAL JOINT, PERCUTANEOUS APPROACH, DIAGNOSTIC: ICD-10-PCS | Performed by: INTERNAL MEDICINE

## 2022-12-20 PROCEDURE — 3E0U33Z INTRODUCTION OF ANTI-INFLAMMATORY INTO JOINTS, PERCUTANEOUS APPROACH: ICD-10-PCS | Performed by: INTERNAL MEDICINE

## 2022-12-20 PROCEDURE — 250N000011 HC RX IP 250 OP 636: Performed by: INTERNAL MEDICINE

## 2022-12-20 RX ORDER — TRIAMCINOLONE ACETONIDE 40 MG/ML
40 INJECTION, SUSPENSION INTRA-ARTICULAR; INTRAMUSCULAR ONCE
Status: COMPLETED | OUTPATIENT
Start: 2022-12-20 | End: 2022-12-20

## 2022-12-20 RX ORDER — CARBOXYMETHYLCELLULOSE SODIUM 5 MG/ML
1 SOLUTION/ DROPS OPHTHALMIC
Status: DISCONTINUED | OUTPATIENT
Start: 2022-12-20 | End: 2022-12-21 | Stop reason: HOSPADM

## 2022-12-20 RX ORDER — LIDOCAINE HYDROCHLORIDE 10 MG/ML
5 INJECTION, SOLUTION EPIDURAL; INFILTRATION; INTRACAUDAL; PERINEURAL ONCE
Status: COMPLETED | OUTPATIENT
Start: 2022-12-20 | End: 2022-12-20

## 2022-12-20 RX ADMIN — PANTOPRAZOLE SODIUM 40 MG: 40 TABLET, DELAYED RELEASE ORAL at 08:09

## 2022-12-20 RX ADMIN — LACTULOSE 20 G: 20 SOLUTION ORAL at 20:04

## 2022-12-20 RX ADMIN — VANCOMYCIN HYDROCHLORIDE 125 MG: 125 CAPSULE ORAL at 20:04

## 2022-12-20 RX ADMIN — RIFAXIMIN 550 MG: 550 TABLET ORAL at 20:03

## 2022-12-20 RX ADMIN — RIFAXIMIN 550 MG: 550 TABLET ORAL at 08:09

## 2022-12-20 RX ADMIN — DILTIAZEM HYDROCHLORIDE 120 MG: 120 CAPSULE, COATED, EXTENDED RELEASE ORAL at 08:09

## 2022-12-20 RX ADMIN — VANCOMYCIN HYDROCHLORIDE 125 MG: 125 CAPSULE ORAL at 16:03

## 2022-12-20 RX ADMIN — HUMAN ALBUMIN MICROSPHERES AND PERFLUTREN 5 ML: 10; .22 INJECTION, SOLUTION INTRAVENOUS at 10:39

## 2022-12-20 RX ADMIN — Medication 1 DROP: at 11:00

## 2022-12-20 RX ADMIN — Medication 1 TABLET: at 08:08

## 2022-12-20 RX ADMIN — VANCOMYCIN HYDROCHLORIDE 125 MG: 125 CAPSULE ORAL at 12:24

## 2022-12-20 RX ADMIN — TRIAMCINOLONE ACETONIDE 40 MG: 40 INJECTION, SUSPENSION INTRA-ARTICULAR; INTRAMUSCULAR at 20:06

## 2022-12-20 RX ADMIN — VANCOMYCIN HYDROCHLORIDE 125 MG: 125 CAPSULE ORAL at 08:08

## 2022-12-20 RX ADMIN — ZINC SULFATE 220 MG (50 MG) CAPSULE 220 MG: CAPSULE at 08:09

## 2022-12-20 RX ADMIN — PREDNISONE 40 MG: 20 TABLET ORAL at 08:09

## 2022-12-20 RX ADMIN — LIDOCAINE HYDROCHLORIDE 5 ML: 10 INJECTION, SOLUTION EPIDURAL; INFILTRATION; INTRACAUDAL; PERINEURAL at 20:05

## 2022-12-20 RX ADMIN — Medication 1 TABLET: at 20:04

## 2022-12-20 RX ADMIN — FOLIC ACID 5 MG: 1 TABLET ORAL at 08:08

## 2022-12-20 ASSESSMENT — ACTIVITIES OF DAILY LIVING (ADL)
ADLS_ACUITY_SCORE: 18

## 2022-12-20 ASSESSMENT — ENCOUNTER SYMPTOMS
VOMITING: 0
COUGH: 0
PALPITATIONS: 0
CHILLS: 0
ABDOMINAL PAIN: 0
FEVER: 0
DIARRHEA: 0
SHORTNESS OF BREATH: 0

## 2022-12-20 NOTE — CONSULTS
Cardiology Inpatient Consultation  December 20, 2022    Reason for Consult:  A cardiology consult was requested by Antoni Whipple MD from the Nephrology service to provide pre-op evaluation and risk stratification for liver and kidney transplant.    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 His cardiac history is notable for HTN and persistent a-fib w/ recurrent cycle of RVR not currently on AC. Last Cardiac MRI in 2019 revealed LVEF 61% and not consistent with hemochromatosis deposition. His last echo 9/22 showed normal EF 60-65% with no wall abnormality. Currently evaluated for Liver/kidney transplant with declining renal function and ESLD with high MELD score. Diagnosed in 2016 with liver disease, course worsened with variceal bleeding now s/p TIPS 10/1/22. Recent declining renal function, diagnosed with ANCA vasculitis, IgA nephropathy with Cr ~5.40... Also, h/o psoriasis/psoriatic arthritis/RA on Humira then transitioned to prednisone. Has developed significant LE and UE edema more recently but has been treated with diuretics. MELDNa is up to 26 with kidney function declining significantly.    #Risk stratification and evaluation    The patient is being evaluated for liver and kidney transplant in the setting of worsening renal function and found to have a severe C.diff infection, the above mentioned procedure is stratified as a intermediate risk procedure and is determined to be time-sensitive. He is currently not in an decompensated form of ACS or HF and remain HDS. The patient has no history, reported per chart review, of coronary artery syndrome, history of heart failure or acute cardiomyopathy with last echocardiogram showing normal LVEF 60-65% and Ct Chest revealing no pericardial effusion or cardiomyopathhy. However he's diagnosed  with hemochromatosis in 10/19.    Patient physical activity are described as being actively walking daily for approx 30min and now has transitioned to indoor bike which he does for an 1 hour at light-moderate resistance. He does mention he falls SOB and needs rest, but is able to continue daily chores. No relative daily incapacity to maintain routine. Due to these informations, we determined the METS at ~ 4 and he will require further testing.       Mr Damion Quinones is a moderate risk patient going for a intermediate risk surgery. His RCRI is 2, putting him at a 10.1% chance of allison-op serious complications. Further cardiac testing is needed for risk stratification such as non-contrast cardiac MRI and dobutamine stress echocardiogram.    12/20 TTE revealed normal EF 60-65%, no pulmonary hypertension with normal PA pressure.    - Dobutamine stress echcardiogram for CAD  - Non contrast cardiac MRI for h/o hemochromatosis  - Either tests can be performed as IP or OP depending on when pt will be scheduled for his liver/kidney transplants. He's currently scheduled for a cardiology visit with Dr. Jimenez on 12/27    # Atrial fibrillation + RVR  # HTN  # Hemochromatosis  Last Cardiac MRI in 2019 with LVEF 61%, T2 24 msec, not c/w hemochromatosis, Normal RV , mod to severe LA/RA dilation; now in the setting of severe PATI and CKD 4/5 risks vs benefits of full contrast imaging is present which could exacerbate even more his kidney functions. We opted for non con MRI and dobutamine stress test to establish patient situation.  His current [id number] score is 3 putting hime at 3.2% of stroke with h/o Afib, age and vascular disease and HTN. However in never been on AC d/t h/o GI bleed  - Dobutamine stress echo  - NonCon Cardiac MRI  - Continue CCB in the allison-op period, resume ASA when feasible  post-op.  - Monitor I/Os  - Recommend DOAC when feasible in the post-period   - scheduled for cardiology visit on 12/27 w/   Barbara    Plan of care discussed with , who agrees with above plan.      Thank you for consulting the cardiovascular services at the [facility]. Please do not hesitate to call us with any questions.       Bertin Aguillon PGY-1 Oral&Maxillofacial surgery   Cardiology consult service    HPI:   Wife OTP to add to the discussion, is in charged of his daily medication at home  Damion Quinones is a 67 year old male with PMH of decompensated alcohol + hemochromatosis (homozygous H63D) cirrhosis complicated by variceal bleeding s/p TIPS (10/1/2022) and hepatic encephalopathy. PMHx also includes alcohol overuse, CKD (IgA nephropathy + ANCA vasculitis), psoriatic arthritis, atrial fibrillation and HTN. He is admitted with diarrhea and worsening renal function, and found to be C diff positive.     He described the onset of diarrhea last week being watery in consistence after using a recent oral abx for skin soft tissue infection. He was seen in clinic and was found to have worsening kidney functions and was told to come to the ED. He mentioned having not being able to go for his daily walk since this episode and especially with the winter getting worse.   Prior to this current admission, he was walking 6-7 laps of his driveway which approx 250 feet in distance and transitioned to his indoor bike with the weather worsening. He does 1 hour of light ot moderate indoor bike to maintain the form, takes care of his house, gardening, shovel the driveway and does the errands daily. He mentioned feeling tired daily after activities, but knows he need to get going and isn't affected by any CP, extreme SOB, palpitations and lightheadedness. Currently denies taking anticoagulation for a-fib, only 81mg of ASA, consistent with home med regimen. Has an outpatient cardiologist appointment on the 27th of December.     At the time of interview, the patient denies chest pain, dyspnea at rest or with  exertion, orthopnea, PND, palpitations, lightheadedness, or syncope.     Review of Systems:    Complete review of systems was performed and negative except per HPI.    PMH:  Past Medical History:   Diagnosis Date     Alcoholic cirrhosis of liver with ascites (H) 10/11/2019     Arthritis     RA     Chronic kidney disease      Coronary artery disease     AFib 2016     Diabetes mellitus type 2, uncomplicated (H) 10/11/2019     History of blood transfusion     2016     History of hemochromatosis 10/11/2019     Hypertension      Hypokalemia      Obesity      PAF (paroxysmal atrial fibrillation) (H)      Psoriatic arthritis (H)      Active Problems:  Patient Active Problem List    Diagnosis Date Noted     Acute kidney injury superimposed on chronic kidney disease (H) 12/16/2022     Priority: Medium     Chronic kidney disease 12/07/2022     Priority: Medium     Psoriatic arthritis (H) 12/07/2022     Priority: Medium     PAF (paroxysmal atrial fibrillation) (H) 12/07/2022     Priority: Medium     Hypokalemia 12/07/2022     Priority: Medium     Morbid obesity (H) 12/07/2022     Priority: Medium     Obesity 11/23/2022     Priority: Medium     Alcoholic cirrhosis of liver with ascites (H) 10/11/2019     Priority: Medium     History of hemochromatosis 10/11/2019     Priority: Medium     Diabetes mellitus type 2, uncomplicated (H) 10/11/2019     Priority: Medium     Social History:  Social History     Tobacco Use     Smoking status: Never     Smokeless tobacco: Never   Substance Use Topics     Alcohol use: Not Currently     Comment: Last ETOH use was 12/31/2021     Drug use: Not Currently     Family History:  Family History   Problem Relation Age of Onset     Lung Cancer Mother      Colon Cancer Father      Lung Cancer Brother      Heart Failure Brother      Kidney Disease Brother        Medications:    calcium carbonate-vitamin D  1 tablet Oral BID     diltiazem ER COATED BEADS  120 mg Oral Daily     folic acid  5 mg Oral Daily      lactulose  20 g Oral TID     pantoprazole  40 mg Oral Daily     predniSONE  40 mg Oral Daily     rifaximin  550 mg Oral BID     sodium chloride (PF)  3 mL Intracatheter Q8H     sodium chloride (PF)  3 mL Intracatheter Q8H     vancomycin  125 mg Oral 4x Daily     zinc sulfate  220 mg Oral Daily           Physical Exam:  Temp:  [97.7  F (36.5  C)-98.5  F (36.9  C)] 97.7  F (36.5  C)  Pulse:  [74-80] 79  Resp:  [16-18] 16  BP: (128-152)/(75-97) 137/89  SpO2:  [99 %-100 %] 99 %    Intake/Output Summary (Last 24 hours) at 12/20/2022 0838  Last data filed at 12/19/2022 2343  Gross per 24 hour   Intake 420 ml   Output --   Net 420 ml     GEN: pleasant, no acute distress  HEENT: no icterus  CV: Afib normal rate, normal s1/s2, no murmurs/rubs/s3/s4, no heave. JVP.   CHEST: clear to ausculation bilaterally, no rales or wheezing  ABD: enlarged with umbilicalsoft, non-tender, normal active bowel sounds  EXTR: pulses. Mild BLE/BUE pedal edema, lymphedema present in LE.   NEURO: alert oriented, speech fluent/appropriate, motor grossly nonfocal      Diagnostics:  All labs and imaging were reviewed, of note:    CMP  Recent Labs   Lab 12/20/22  0733 12/19/22  2032 12/19/22  0942 12/19/22  0530 12/18/22  2100 12/17/22  0114 12/16/22  1652   NA  --  132* 137 135* 131*   < > 130*   POTASSIUM  --  4.0 3.7 4.3 4.6   < > 5.7*   CHLORIDE  --  97* 98 100 95*   < > 94*   CO2  --  18* 20* 19* 19*   < > 19*   ANIONGAP  --  17* 19* 16* 17*   < > 17*   GLC  --  157* 99 107* 137*   < > 109*   BUN  --  98.0* 105.0* 107.0* 104.0*   < > 104.0*   CR  --  5.34* 5.91* 5.96* 5.81*   < > 6.50*   GFRESTIMATED  --  11* 10* 10* 10*   < > 9*   NAZARIO  --  8.3* 8.8 8.8 8.7*   < > 9.0   MAG  --   --   --   --   --   --  2.3   PHOS  --   --   --   --   --   --  6.3*   PROTTOTAL 5.4*  --   --  5.1*  --   --  6.2*   ALBUMIN 2.8*  --   --  2.6*  --   --  3.2*   BILITOTAL 0.7  --   --  0.6  --   --  0.9   ALKPHOS 210*  --   --  208*  --   --  227*   AST 59*  --    --  54*  --   --  47   ALT 46  --   --  41  --   --  34    < > = values in this interval not displayed.     CBC  Recent Labs   Lab 12/20/22  0733 12/19/22  0545 12/18/22  0610 12/17/22  0744   WBC 14.0* 13.9* 13.8* 12.2*   RBC 3.75* 3.69* 3.67* 4.05*   HGB 11.1* 11.0* 11.1* 12.1*   HCT 34.5* 33.3* 33.7* 37.3*   MCV 92 90 92 92   MCH 29.6 29.8 30.2 29.9   MCHC 32.2 33.0 32.9 32.4   RDW 14.6 14.5 14.6 14.6    203 209 218     INR  Recent Labs   Lab 12/20/22  0733 12/16/22  1652   INR 1.20* 1.20*     Arterial Blood Gas  Recent Labs   Lab 12/19/22  0942   O2PER 21       No results found for: TROPI, TROPONIN, TROPR, TROPN    EKG: Afib with rate at 71bpm    Transthoracic echocardiogram:   Normal EF, no pulm HTN, normal PA pressure  Nuclear stress test:

## 2022-12-20 NOTE — PLAN OF CARE
Goal Outcome Evaluation:    Plan of Care Reviewed With: patient    Overall Patient Progress: improving        Outcome Evaluation: Continues to be A&O, vitally stable on RA overnight and up IND to bathroom.  Denies pain.  Slept well.  Reports improvement in BMs, pt aware of strict I&Os, so far no BM reported to staff.  Voiding WNL.  PIV saline locked.  Mild edema in BLE.  Will monitor labs in AM, pt is hopeful to dischage 12/20, will continue to monitor

## 2022-12-20 NOTE — PROGRESS NOTES
GI Progress Note  Date of Service:  12/20/2022      Assessment:   67 year old male with PMHx of decompensated alcohol + hemochromatosis (homozygous H63D) cirrhosis complicated by variceal bleeding s/p TIPS (10/1/2022) and hepatic encephalopathy. PMHx also includes alcohol overuse, CKD (IgA nephropathy + ANCA vasculitis), psoriatic arthritis, atrial fibrillation and HTN. He is admitted with diarrhea and worsening renal function, and found to be C diff positive.     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      Decompensated Cirrhosis 2/2 alcohol +/- hemochromatosis, MELD Na 22  Etiology: Alcohol and hemochromatosis (homozygous H63D)   Ascites : None   HE : Developed after TIPS, on rifaxamin, lactulose on hold due to diarrhea, no evidence of HE on exam today  hx variceal bleed: s/p TIPS (10/1/2022), no recurrent bleeding  last EGD 9/2022: Small EV, IGV1 oozing, portal HTN gastropathy, IGV2 - s/p TIPS 10/1/2022  HCC screening US 11/2022 without focal liver lesion  PATI: See above   HCC surveillance: US Abd 11/22/2022 No HCC, patent TIPS   Infective work up: UA negative, Xray chest negative for any infiltrate, C diff positive (see above)   Outpatient hepatologist : Dr. Bermudez/Dr. Poole      Liver transplant candidacy: Under Liver and kidney evaluation as an outpatient, was seen by transplant hepatology, nephrology and transplant surgery as an outpatient.  Request for cardiology eval as inpatient, plan for dobutamine stress test in AM. Will need sleep clinic follow up as outpatient.      Recommendations:   - Continue p.o. vancomycin 125 mg 4 times daily for 10 days  - Lactulose on hold for now,  until diarrhea resolves with vancomycin, then can restart  - Continue rifaximin and zinc  - Appreciate nephrology reccomendations, on albumin  "challenge, lokemia for hyperkalemia, diuretics on hold.   -  Appreciate cardiology eval, plan for dobutamine stress echo  -  We will continue to follow     Patient was discussed with Dr. Poole   __________________________________________________________________________________  Subjective:  Nursing notes reviewed and noted.  Bowel movements are improving, reports solid stool now   Denies any abd pain/fever/chills        Physical Examination:  Vital Signs:  /75 (BP Location: Left arm)   Pulse 75   Temp 97.8  F (36.6  C) (Oral)   Resp 16   Ht 1.727 m (5' 8\")   Wt 99.9 kg (220 lb 4.8 oz)   SpO2 99%   BMI 33.50 kg/m       Gen: Pt in no acute distress  Eyes:Conjunctiva anicteric   Heart: regular rate and rhtyhm  Lungs: Non labored breathing  Abdomen: Soft, Non tender, non distended  Neuro: A & O x 3    DATA:  Labs:    Reviewed and noted            "

## 2022-12-20 NOTE — PLAN OF CARE
5A PT discharge  Physical Therapy: Orders received. Chart reviewed and discussed with care team.? Physical Therapy not indicated due to pt being up IND.? Defer discharge recommendations to care team.? Will complete orders.

## 2022-12-20 NOTE — PROGRESS NOTES
"  Nephrology Progress Note  12/20/2022         Assessment & Recommendations:   #PATI on CKD likely 2/2 diarrhea(improving)  Significant recent diarrhea in the setting of C. Diff, Urine Na <20 suggesting likely acute pre-renal etiology. Getting IV Ringer lactate 1 liter bolus  -Agree with holding diuretics  -If creatinine trending down alright from nephrology point of view to discharge with BMP in couple of days and follow up with nephrology  -Accurate I/O's  -Resume diuretics if he gains more than 3 pound above his dry weight with swelling in leg and he is hypertensive.  -If he is having significant diarrhea and becoming hypotensive and has loss more than 2-3 pounds in a day should hold Lasix and contact his primary nephrologist      Hyperkalemia:(resolved)      Low bicarb from diarrhea and renal failure (resolve)    Anemia of chronic disease:  Hb in 11's iron store replete      Recommendations were communicated to primary team verbally    Seen and discussed with Dr. Debbie Carl MD   Division of Renal Disease and Hypertension  [facility]  FanSnapmail  Vocera Web Console    Interval History :   Nursing and provider notes from last 24 hours reviewed.  In the last 24 hours Damion Quinones had 4 bowel movements today all; solid stools and  creatinine improve  Review of Systems:   I reviewed the following systems:  GI: decreased appetite. No  nausea or vomiting    Neuro:  No confusion  Constitutional:  No fever or chills  CV: No dyspnea or edema.  No chest pain.    Physical Exam:   I/O last 3 completed shifts:  In: 900 [P.O.:900]  Out: -    /75 (BP Location: Left arm)   Pulse 75   Temp 97.8  F (36.6  C) (Oral)   Resp 16   Ht 1.727 m (5' 8\")   Wt 99.9 kg (220 lb 4.8 oz)   SpO2 99%   BMI 33.50 kg/m       GENERAL APPEARANCE: Patient is awake and not in distress  EYES:  No scleral icterus, pupils equal  PULM: lungs clear  to auscultation bilaterally, equal air movement, no clubbing  CV: Patient has regular " rhythm, normal rate, no rub     -JVD -ve     -edema -ve  GI: soft, non tender, non distended, bowel sounds are present  INTEGUMENT: no cyanosis, no rash  NEURO:  No asterixis   Access none    Labs:   All labs reviewed by me  Electrolytes/Renal -   Recent Labs   Lab Test 12/20/22  0733 12/19/22  2032 12/19/22  0942 12/17/22  0114 12/16/22  1652 11/22/22  0848    132* 137   < > 130* 142   POTASSIUM 4.1 4.0 3.7   < > 5.7* 3.6   CHLORIDE 100 97* 98   < > 94* 97*   CO2 22 18* 20*   < > 19* 31*   BUN 97.6* 98.0* 105.0*   < > 104.0* 69.4*   CR 5.44* 5.34* 5.91*   < > 6.50* 4.52*   GLC 85 157* 99   < > 109* 103*   NAZARIO 8.8 8.3* 8.8   < > 9.0 9.4   MAG  --   --   --   --  2.3  --    PHOS  --   --   --   --  6.3* 4.4    < > = values in this interval not displayed.       CBC -   Recent Labs   Lab Test 12/20/22  0733 12/19/22  0545 12/18/22  0610   WBC 14.0* 13.9* 13.8*   HGB 11.1* 11.0* 11.1*    203 209       LFTs -   Recent Labs   Lab Test 12/20/22  0733 12/19/22  0530 12/16/22  1652   ALKPHOS 210* 208* 227*   BILITOTAL 0.7 0.6 0.9   ALT 46 41 34   AST 59* 54* 47   PROTTOTAL 5.4* 5.1* 6.2*   ALBUMIN 2.8* 2.6* 3.2*       Iron Panel -   Recent Labs   Lab Test 11/22/22  0848   IRON 68   IRONSAT 31   HOLA 429*         Imaging:  All imaging studies reviewed by me.     Current Medications:    calcium carbonate-vitamin D  1 tablet Oral BID     diltiazem ER COATED BEADS  120 mg Oral Daily     folic acid  5 mg Oral Daily     lactulose  20 g Oral TID     lidocaine (PF)  5 mL Infiltration Once     pantoprazole  40 mg Oral Daily     predniSONE  40 mg Oral Daily     rifaximin  550 mg Oral BID     sodium chloride (PF)  3 mL Intracatheter Q8H     sodium chloride (PF)  3 mL Intracatheter Q8H     triamcinolone  40 mg INTRA-ARTICULAR Once     vancomycin  125 mg Oral 4x Daily     zinc sulfate  220 mg Oral Daily       Cy Carl MD

## 2022-12-20 NOTE — PLAN OF CARE
Goal Outcome Evaluation:      Plan of Care Reviewed With: patient    Overall Patient Progress: improving    Outcome Evaluation: A&O x 4.  VSS on room air.  Ambulates independently.  Finishing LR bolus through PIV.  Continues on po vanco for c-diff.  Tolerates regular diet.  No n/v.  Awaiting TCU placement.

## 2022-12-20 NOTE — PLAN OF CARE
Goal Outcome Evaluation:      Plan of Care Reviewed With: patient    Overall Patient Progress: improving    Outcome Evaluation: A&Ox4. VSS on RA. Denies pain, N/V. Per patient report 3 BMs overnight and 1 BM during shift. Lactulose held by provider d/t frequent BMs. Pt voiding WNL. PRN refresh drops given for dry/irritated eyes. BL LE edema continued. Dressing changed on L forearm skin tear. PO vanco continued. Pt up independent in room, ambulated halls this afternoon.

## 2022-12-20 NOTE — COMMITTEE REVIEW
Abdominal Committee Review Note     Evaluation Date: 11/22/2022  Committee Review Date: 12/20/2022    Organ being evaluated for: Liver    Transplant Phase: Evaluation  Transplant Status: Active    Transplant Coordinator: Meenu Saha  Transplant Surgeon:       Referring Physician: Junaid Beaulieu    Primary Diagnosis: Alcoholic Cirrhosis  Secondary Diagnosis:     Committee Review Members:  Nutrition Nidhi Leal, RD   Pharmacist Roe Charles, [facility]    - Clinical Jamal Bettencourt, MOUNIKA, Joi Gonsalez, [facility]   Transplant Meenu Saha, RN, Jr Abdias Quinteros, RN, Ariane Hussein, APRN CNP, Michael Solares, RN, Antoni Mcnair MD   Transplant Hepatology  Apoorva Zaman, RN, Dorcas Lamb, LORIN, Gabriela Wright, RN, Lolis Bermudez MD, Jose Poole MD, Magda Streeter MD, Lorna Mancilla, LORIN, Angelita Broussard MD, Dwight Green MD, Randy Ribera MD, Thomas M. Leventhal, MD   Transplant Surgery Conor Dc MD, Fuad Rodas MD, Chad Clifton MD       Transplant Eligibility: Cirrhosis with MELD, ETOH, CKD    Committee Review Decision: Needs Re-presentation    Relative Contraindications: Other    Absolute Contraindications: None    Committee Chair Jose Poole MD verbally attested to the committee's decision.    Committee Discussion Details:      Currently inpatient, re-discuss pending completion of full evaluation including, but not limited to:    1) Cardiac evaluation- will get DSE while inpatient, also scheduled for cardiology outpatient consultation    2) Sleep study after discharged

## 2022-12-21 ENCOUNTER — TELEPHONE (OUTPATIENT)
Dept: TRANSPLANT | Facility: CLINIC | Age: 67
End: 2022-12-21

## 2022-12-21 VITALS
DIASTOLIC BLOOD PRESSURE: 83 MMHG | TEMPERATURE: 98 F | SYSTOLIC BLOOD PRESSURE: 150 MMHG | WEIGHT: 220.3 LBS | BODY MASS INDEX: 33.39 KG/M2 | HEART RATE: 77 BPM | HEIGHT: 68 IN | RESPIRATION RATE: 16 BRPM | OXYGEN SATURATION: 100 %

## 2022-12-21 DIAGNOSIS — R06.83 SNORING: ICD-10-CM

## 2022-12-21 DIAGNOSIS — K70.30 ALCOHOLIC CIRRHOSIS (H): Primary | ICD-10-CM

## 2022-12-21 DIAGNOSIS — N18.5 CHRONIC KIDNEY DISEASE, STAGE 5, KIDNEY FAILURE (H): ICD-10-CM

## 2022-12-21 DIAGNOSIS — R93.1 ABNORMAL FINDINGS ON DIAGNOSTIC IMAGING OF HEART AND CORONARY CIRCULATION: ICD-10-CM

## 2022-12-21 LAB
ALBUMIN SERPL BCG-MCNC: 2.7 G/DL (ref 3.5–5.2)
ALP SERPL-CCNC: 221 U/L (ref 40–129)
ALT SERPL W P-5'-P-CCNC: 62 U/L (ref 10–50)
ANION GAP SERPL CALCULATED.3IONS-SCNC: 15 MMOL/L (ref 7–15)
AST SERPL W P-5'-P-CCNC: 65 U/L (ref 10–50)
BACTERIA BLD CULT: NO GROWTH
BACTERIA BLD CULT: NO GROWTH
BILIRUB DIRECT SERPL-MCNC: 0.29 MG/DL (ref 0–0.3)
BILIRUB SERPL-MCNC: 0.5 MG/DL
BUN SERPL-MCNC: 92.2 MG/DL (ref 8–23)
CALCIUM SERPL-MCNC: 8.6 MG/DL (ref 8.8–10.2)
CHLORIDE SERPL-SCNC: 102 MMOL/L (ref 98–107)
CREAT SERPL-MCNC: 5.09 MG/DL (ref 0.67–1.17)
DEPRECATED HCO3 PLAS-SCNC: 20 MMOL/L (ref 22–29)
ERYTHROCYTE [DISTWIDTH] IN BLOOD BY AUTOMATED COUNT: 14.4 % (ref 10–15)
GFR SERPL CREATININE-BSD FRML MDRD: 12 ML/MIN/1.73M2
GLUCOSE SERPL-MCNC: 114 MG/DL (ref 70–99)
HCT VFR BLD AUTO: 33 % (ref 40–53)
HGB BLD-MCNC: 11 G/DL (ref 13.3–17.7)
INR PPP: 1.23 (ref 0.85–1.15)
MCH RBC QN AUTO: 30.3 PG (ref 26.5–33)
MCHC RBC AUTO-ENTMCNC: 33.3 G/DL (ref 31.5–36.5)
MCV RBC AUTO: 91 FL (ref 78–100)
PLATELET # BLD AUTO: 167 10E3/UL (ref 150–450)
POTASSIUM SERPL-SCNC: 3.8 MMOL/L (ref 3.4–5.3)
PROT SERPL-MCNC: 5.2 G/DL (ref 6.4–8.3)
RBC # BLD AUTO: 3.63 10E6/UL (ref 4.4–5.9)
SODIUM SERPL-SCNC: 137 MMOL/L (ref 136–145)
WBC # BLD AUTO: 13 10E3/UL (ref 4–11)

## 2022-12-21 PROCEDURE — 250N000013 HC RX MED GY IP 250 OP 250 PS 637: Performed by: STUDENT IN AN ORGANIZED HEALTH CARE EDUCATION/TRAINING PROGRAM

## 2022-12-21 PROCEDURE — 36415 COLL VENOUS BLD VENIPUNCTURE: CPT | Performed by: STUDENT IN AN ORGANIZED HEALTH CARE EDUCATION/TRAINING PROGRAM

## 2022-12-21 PROCEDURE — 82248 BILIRUBIN DIRECT: CPT | Performed by: HOSPITALIST

## 2022-12-21 PROCEDURE — 99239 HOSP IP/OBS DSCHRG MGMT >30: CPT | Performed by: STUDENT IN AN ORGANIZED HEALTH CARE EDUCATION/TRAINING PROGRAM

## 2022-12-21 PROCEDURE — 250N000013 HC RX MED GY IP 250 OP 250 PS 637: Performed by: NURSE PRACTITIONER

## 2022-12-21 PROCEDURE — 80053 COMPREHEN METABOLIC PANEL: CPT | Performed by: STUDENT IN AN ORGANIZED HEALTH CARE EDUCATION/TRAINING PROGRAM

## 2022-12-21 PROCEDURE — 85027 COMPLETE CBC AUTOMATED: CPT | Performed by: STUDENT IN AN ORGANIZED HEALTH CARE EDUCATION/TRAINING PROGRAM

## 2022-12-21 PROCEDURE — 250N000012 HC RX MED GY IP 250 OP 636 PS 637: Performed by: NURSE PRACTITIONER

## 2022-12-21 PROCEDURE — 85610 PROTHROMBIN TIME: CPT | Performed by: HOSPITALIST

## 2022-12-21 RX ORDER — VANCOMYCIN HYDROCHLORIDE 125 MG/1
125 CAPSULE ORAL 4 TIMES DAILY
Qty: 6 CAPSULE | Refills: 0 | Status: ON HOLD | OUTPATIENT
Start: 2022-12-21 | End: 2023-01-16

## 2022-12-21 RX ADMIN — DILTIAZEM HYDROCHLORIDE 120 MG: 120 CAPSULE, COATED, EXTENDED RELEASE ORAL at 08:22

## 2022-12-21 RX ADMIN — LACTULOSE 20 G: 20 SOLUTION ORAL at 08:21

## 2022-12-21 RX ADMIN — FOLIC ACID 5 MG: 1 TABLET ORAL at 08:21

## 2022-12-21 RX ADMIN — RIFAXIMIN 550 MG: 550 TABLET ORAL at 08:22

## 2022-12-21 RX ADMIN — VANCOMYCIN HYDROCHLORIDE 125 MG: 125 CAPSULE ORAL at 08:22

## 2022-12-21 RX ADMIN — PREDNISONE 40 MG: 20 TABLET ORAL at 08:22

## 2022-12-21 RX ADMIN — ZINC SULFATE 220 MG (50 MG) CAPSULE 220 MG: CAPSULE at 08:22

## 2022-12-21 RX ADMIN — Medication 1 TABLET: at 08:22

## 2022-12-21 RX ADMIN — PANTOPRAZOLE SODIUM 40 MG: 40 TABLET, DELAYED RELEASE ORAL at 08:22

## 2022-12-21 ASSESSMENT — ACTIVITIES OF DAILY LIVING (ADL)
ADLS_ACUITY_SCORE: 18

## 2022-12-21 NOTE — PROCEDURES
"After consent was obtained, using sterile technique the R 2nd MCP was prepped and plain Lidocaine 1% plain was used as local anesthetic. The joint was entered and 0.1 cc's of pale yellow paste-like fluid was withdrawn and sent for polarized microscopy (see progress note: negatively birefringent crystals without cells).  Steroid triamcinolone 10 mg  and 0.2 cc plain Lidocaine was then injected and the needle withdrawn.  The procedure was well tolerated.  The patient is asked to continue to rest the joint for a few more days before resuming regular activities.  It may be more painful for the first 1-2 days.  Watch for fever, or increased swelling or persistent pain in the joint. Call or return to clinic prn if such symptoms occur or there is failure to improve as anticipated.    I performed the procedure, while Dr. Ware was present and supervised for the entirety of its duration.     Polo \"BJ\" MD Camilo, PhD  PGY-3 Rheumatology Fellow  [id number]    I supervised this procedure with the Rheumatology Fellow.    Manny Ware M.D.  Staff Rheumatologist, [facility]  Pager [phone]    "

## 2022-12-21 NOTE — PLAN OF CARE
Goal Outcome Evaluation:      Plan of Care Reviewed With: patient    Overall Patient Progress: improving    Outcome Evaluation: No acute changes this shift. VSS ex. HTN, pt on RA. Up independently in room. Denies pain, N/V. 1 BM this shift, scheduled lactulose taken. BLE edema present in feet/ankles. Pt adequate for discharge. PIV removed. AVS reviewed with patient and spouse, no questions at this time. Pt will schedule stress test and MRI outpatient. Pt arranged own transportation. All belongings returned to patient at discharge.

## 2022-12-21 NOTE — PLAN OF CARE
Goal Outcome Evaluation:      Plan of Care Reviewed With: patient    Overall Patient Progress: improvingOverall Patient Progress: improving     A&OX4, VSs, on RA. No c/o pain. Lidocaine 1% and Steriod injection given by rheumatology team for athrocentesis. PIV SL. Independent in room. Lactulose given this evening. PO vanco adminstered. L forearm dressing CDI. Got a recliner in room. No acute changes this shift. Plan of care continued.

## 2022-12-21 NOTE — PROGRESS NOTES
Care Management Discharge Note    Discharge Date: 12/21/2022       Discharge Disposition: Home    Discharge Services: None    Discharge DME: None    Discharge Transportation:  Wife will give ride    Private pay costs discussed: Not applicable    PAS Confirmation Code:  NA  Patient/family educated on Medicare website which has current facility and service quality ratings:  yes    Education Provided on the Discharge Plan: yes   Persons Notified of Discharge Plans: pt let wife know  Patient/Family in Agreement with the Plan:  yes  Handoff Referral Completed: yes    Additional Information:  Pt is ready for discharge today. Pt's PCP is Dr. Polo Foss. EHO completed. [place] wife will give pt a ride home. No further discharge support needed.    Alysha Johnson RN  5A RN Care Coordinator  Phone: [phone]  Pager: [phone]     For Weekend & Holiday on call RN Care Coordinator:  (Tasks: Home care, home infusion, medical equipment/oxygen, transportation, IMM & MOON forms, etc.)     Text Paging in [employer] Smart Web is the preferred method of contact for these teams     [place] [facility] [place] (4048-3501) Saturday & Sunday; (1829-1652) [facility] Recognized Holidays  Pager: [phone] Units: 4A, 4C, 4E, 5A & 5B      For Weekend & Holiday on call Social Work:  (Tasks: TCU, transportation, Hospice, adjustment to illness counseling, Health Care Directives, Child Protection and Domestic Violence concerns, Vulnerable Adult, IMM forms, etc.)     Text Paging in [employer] Smart Web is the preferred method of contact for these teams    [place] (0800 - 1630) Saturday and Sunday  Pager: [phone] Units: 4A, 4C, 4E  Pager: [phone] Units: 5A & 5B  _____________________________________________     After hours ([id number]) for all units everyday- (only the  is available after hours until midnight)  Pager [phone]

## 2022-12-21 NOTE — PROGRESS NOTES
"[facility] Rheumatology IP Progress Note     ASSESSMENT AND RECOMMENDATIONS:  Damion Quinones is a 67 year old male admitted on 12/16/2022. 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 difficile infection.     Today, Mr. Quinones underwent arthrocentesis with corticosteroid injection. A small quantity of toothpaste-like material was removed from the R 2nd MCP. This material was examined under polarized microscopy, and found to have an abundance of needle-shaped negatively birefringent crystals on polarized microscopy.      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  I communicated the assessment and plan to the consulting team.     Case seen and discussed with Dr. Manny Cuellar \"REFUGIO\" MD Camilo, PhD  PGY-3 Rheumatology Fellow  [phone] [text page]    I saw this patient with the Rheumatology Fellow. I agree with the findings and recommendations. I was present for aspiration/injection, L 2nd MCP. I conducted microscopic examination of joint aspirate with Dr. Page. Patient has a significant burden of tophi;  I suspect that a significant component of waxing/waning chronic polyarticular " arthritis reflects active gouty arthropathy. Other possible contributors include psoriatic arthritis, or arthropathy related to GPA.  Until Fall 2022, therapy for arthropathy has emphasized treating psoriatic arthritis with adalimumab. I agree with continuing to hold anti-TNF while completing workup for malignancy. However, I recommend consideration of urate lowering therapy with febuxostat (40 mg daily) during work-up/evaluation for renal/hepatic transplantation.    Manny Ware M.D.  Staff Rheumatologist, [facility]  Pager [phone]    PROCEDURE NOTE: The left second MCP was prepared for aseptic technique and appropriate manner.  1 cc 1% lidocaine was injected in the dorsal medial skin around the second MCP joint.  Scant white viscous material was aspirated with a 23-gauge needle.  Approximately 0.3 mils of triamcinolone acetonide (40 mg/mL) injected with 25-gauge needle.  Patient tolerated procedure well and noticed decreased pain immediately following.    Microscopic examination: Material aspirated from left second MCP was examined under polarized light microscopy.  Needle shaped negatively birefringent crystals with red filter characteristics of monosodium urate, too numerous to count were observed.

## 2022-12-21 NOTE — PLAN OF CARE
"Goal Outcome Evaluation:      Plan of Care Reviewed With: patient    Overall Patient Progress: improvingOverall Patient Progress: improving    Outcome Evaluation: No major changes.  Pt slept well, hypertensive this morning otherwise stable on RA, and up IND to bathroom.  1 BM this AM.  No complaints overnight.  PIV saline locked.  L forearm dressing CDI.  BLE edema present, +1/+2 in ankles/feet.  Calls to make needs known, pt is hoping to discharge in AM.  Will continue to monitor    BP (!) 150/83 (BP Location: Right arm)   Pulse 77   Temp 98  F (36.7  C) (Oral)   Resp 16   Ht 1.727 m (5' 8\")   Wt 99.9 kg (220 lb 4.8 oz)   SpO2 100%   BMI 33.50 kg/m      "

## 2022-12-21 NOTE — PROGRESS NOTES
[facility]    Medicine Progress Note - Hospitalist Service, GOLD TEAM 11    Date of Admission:  12/16/2022    Assessment & Plan   Damion Quinones is a 67 year old man admitted on 12/16/2022. He has a history of alcoholic cirrhosis c/b hepatic encephalopathy, esophageal varices, TIPS procedure as well as paroxysmal atrial fibrillation, CKD, psoriatic arthritis, hypertension and ANCA vasculitis who is admitted with PATI in the setting of CKD.     Changes Today:  - Continue oral vancomycin 125mg QID  - BID BMP  - Monitor stool output, patient reports BMs are solid and have decreased in frequency  - Rheumatology consult due to lack of improvement of R 2nd MCP; swelling/tenderness.  S/p MCP arthrocentesis   -Cr improving with gentle hydration, p.o. intake, and holding nephrotoxic agents.     # PATI on CKD - improving  Presents with Cr worsening from 4.52 one month prior to 6.5 on day of admission in the setting of three days of frequent watery diarrhea, found to have C diff. Likely pre-renal in the setting of diarrhea and continued diuretic and lactulose use (lactulose was held for 1 day), but could be ATN. His renal biopsy on 9/9/2022 was read as suspicious for secondary IgA nephropathy but could not exclude un-sampled vasculitis. Cr slowly improving with gentle hydration.   - Nephrology consult  - Hold bumex and spironolactone  - Additional 1L crystalloid today to keep up with stool loses  - BID BMP  - Resumed lactuose now that diarrhea has resolved  - No urgent indication for dialysis currently     # Cdiff colitis  Watery, frequent and ongoing x3 days.  On antibiotics 2 weeks prior. Cdiff positive. Enteric panel negative.  - Continue vancomycin 125mg QID  - Restarted lactulose as diarrhea slowed down and pt wanting to prevent HE.  - Holding diuretics     # Hyperkalemia secondary to PATI on CKD  Slightly up at 5.7. Improved with shifting, but then rising again. No stable.  -  Completed lokelma 10mg TID for 2 days (end 12/18).     # Hyponatremia - resolved  Mildly low and presumably secondary to hypovolemia although cirrhosis could be playing a role. Improved with rehydration.  - Montior     # ESLD  Meld-NA 26 today which is worsened compared to prior.  Prior complications include varices w/ recent emergent TIPS, encephalopathy and ascites.  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  He is undergoing liver/kidney transplant evaluation and has his cardiology visit planned for 12/27.  - Consult hepatology  - Resume lactulose, hold if >3 stools.  - Holding bumex/spironolactone  - Continue home rifaximin     # R 2nd MCP Gout   Presents with swollen and tender R 2nd knuckle swelling.  - Continue prednisone to 40 mg qday  - Rheumatology consult     # History of psoriatic arthritis, ANCA vasculitis  - Hold home prednisone:  Currently on prednisone 5 mg.  Patient plans to lower this to 2.5 mg next week per his rheumatologist.  - Start 40 mg prednisone for gout flare as above    # Risk Stratification for possible transplant  - Cardiology consulted appreciate recommendations  -Cardiology recommend dobutamine stress echo, Noncon cardiac MRI to rule out hemochromatosis  -Patient would like to discharge as soon as possible and cardiology believes this evaluation can occur either inpatient or outpatient  - Patient has outpatient cardiology appointment on 12/27       Diet: Combination Diet Regular Diet Adult    DVT Prophylaxis: Pneumatic Compression Devices  Lux Catheter: Not present  Central Lines: None  Cardiac Monitoring: None  Code Status: Full Code      Disposition Plan      Expected Discharge Date: 12/21/2022        Discharge Comments: PATI on CKD, improving but borderline needing dialysis. Also with Cdiff. Monitoring for renal improvement        The patient's care was discussed with the Patient, Patient's Family and Cardiology Consultant.    Tor Castillo,  "MD  Hospitalist Service, GOLD TEAM 11  [facility]  Securely message with the [employer] Wireless Web Console (learn more here)  Text page via [facility] Paging/Directory   Please see signed in provider for up to date coverage information      Clinically Significant Risk Factors         # Hyponatremia: Lowest Na = 132 mmol/L in last 2 days, will monitor as appropriate     # Anion Gap Metabolic Acidosis: Highest Anion Gap = 19 mmol/L in last 2 days, will monitor and treat as appropriate  # Hypoalbuminemia: Lowest albumin = 2.6 g/dL at 12/19/2022  5:30 AM, will monitor as appropriate           # Obesity: Estimated body mass index is 33.5 kg/m  as calculated from the following:    Height as of this encounter: 1.727 m (5' 8\").    Weight as of this encounter: 99.9 kg (220 lb 4.8 oz).          ______________________________________________________________________    Interval History   Review of Systems   Constitutional: Negative for chills and fever.   Respiratory: Negative for cough and shortness of breath.    Cardiovascular: Negative for chest pain and palpitations.   Gastrointestinal: Negative for abdominal pain, diarrhea (Stools formed and infrequent on vanc and off of the lactulose) and vomiting.         Data reviewed today: I reviewed all medications, new labs and imaging results over the last 24 hours. I personally reviewed no images or EKG's today.    Physical Exam   Vital Signs: Temp: 98  F (36.7  C) Temp src: Oral BP: (!) 152/90 (just ambulated from bathroom) Pulse: 83   Resp: 17 SpO2: 100 % O2 Device: None (Room air)    Weight: 220 lbs 4.8 oz  Physical Exam  Vitals and nursing note reviewed.   Constitutional:       General: He is not in acute distress.  HENT:      Head: Normocephalic and atraumatic.   Eyes:      General: Scleral icterus present.   Pulmonary:      Effort: Pulmonary effort is normal.      Breath sounds: No wheezing.   Musculoskeletal:         General: Normal range of " motion.      Cervical back: Normal range of motion and neck supple.   Skin:     Coloration: Skin is not jaundiced.      Findings: No rash.   Neurological:      Mental Status: He is alert and oriented to person, place, and time. Mental status is at baseline.   Psychiatric:         Mood and Affect: Mood normal.         Behavior: Behavior normal.         Thought Content: Thought content normal.         Judgment: Judgment normal.           Data   Recent Labs   Lab 12/20/22  1947 12/20/22  0733 12/19/22 2032 12/19/22  0942 12/19/22  0545 12/19/22  0530 12/18/22  2100 12/18/22  0610 12/17/22  0114 12/16/22  1652   WBC  --  14.0*  --   --  13.9*  --   --  13.8*   < > 18.2*   HGB  --  11.1*  --   --  11.0*  --   --  11.1*   < > 12.2*   MCV  --  92  --   --  90  --   --  92   < > 90   PLT  --  206  --   --  203  --   --  209   < > 215   INR  --  1.20*  --   --   --   --   --   --   --  1.20*   * 137 132*   < >  --  135*   < > 133*   < > 130*   POTASSIUM 4.0 4.1 4.0   < >  --  4.3   < > 5.2   < > 5.7*   CHLORIDE 102 100 97*   < >  --  100   < > 98   < > 94*   CO2 17* 22 18*   < >  --  19*   < > 18*   < > 19*   BUN 94.4* 97.6* 98.0*   < >  --  107.0*   < > 109.0*   < > 104.0*   CR 5.07* 5.44* 5.34*   < >  --  5.96*   < > 6.27*   < > 6.50*   ANIONGAP 14 15 17*   < >  --  16*   < > 17*   < > 17*   NAZARIO 8.7* 8.8 8.3*   < >  --  8.8   < > 8.6*   < > 9.0   * 85 157*   < >  --  107*   < > 121*   < > 109*   ALBUMIN  --  2.8*  --   --   --  2.6*  --   --   --  3.2*   PROTTOTAL  --  5.4*  --   --   --  5.1*  --   --   --  6.2*   BILITOTAL  --  0.7  --   --   --  0.6  --   --   --  0.9   ALKPHOS  --  210*  --   --   --  208*  --   --   --  227*   ALT  --  46  --   --   --  41  --   --   --  34   AST  --  59*  --   --   --  54*  --   --   --  47   LIPASE  --   --   --   --   --   --   --   --   --  85*    < > = values in this interval not displayed.     Recent Results (from the past 24 hour(s))   Echocardiogram Complete    Result Value    LVEF  55-60%    [facility]    [id number]  [id number]  [id number]  [id number]^SHANNA^ANIBAL     [facility],[place]  Echocardiography Laboratory  [address] [place] [id number]     Name: EMILIANA SCHREIBER  MRN: [id number]  : 1955  Study Date: 2022 10:25 AM  Age: 67 yrs  Gender: Male  Patient Location: [facility]  Reason For Study: Transplant - pre Liver  Ordering Physician: ANIBAL OTERO  Performed By: Marisela Bro RDCS     BSA: 2.1 m2  Height: 68 in  Weight: 220 lb  BP: 128/75 mmHg  ______________________________________________________________________________  Procedure  Echocardiogram with two-dimensional, color and spectral Doppler performed.  Contrast Optison. Optison (NDC #8324-6060-25) given intravenously. Patient was  given 5 ml mixture of 3 ml Optison and 6 ml saline. 4 ml wasted. The patient's  rhythm is atrial fibrillation.  ______________________________________________________________________________  Interpretation Summary  The patient's rhythm is atrial fibrillation.  Global and regional left ventricular function is normal with an EF of 55-60%.  Global right ventricular function is normal.  IVC diameter and respiratory changes fall into an intermediate range  suggesting an RA pressure of 8 mmHg.  No pericardial effusion is present.  There has been no change.  ______________________________________________________________________________  Left Ventricle  Global and regional left ventricular function is normal with an EF of 55-60%.  Left ventricular wall thickness is normal. Left ventricular size is normal.  Diastolic function not assessed due to atrial fibrillation.     Right Ventricle  Global right ventricular function is normal. Mild right ventricular dilation  is present.     Atria  Severe left atrial enlargement is present. Moderate right atrial enlargement  is present.     Mitral Valve  Mild mitral annular calcification is present. Trace mitral  insufficiency is  present.     Aortic Valve  The aortic valve is tricuspid. Moderate aortic valve calcification is present.  On Doppler interrogation, there is no significant stenosis or regurgitation.  The mean AoV pressure gradient is 3.8 mmHg. The calculated aortic valve are is  3.6 cm^2.     Tricuspid Valve  The tricuspid valve is normal. The right ventricular systolic pressure is  approximated at 21.3 mmHg plus the right atrial pressure.     Pulmonic Valve  On Doppler interrogation, there is no significant stenosis or regurgitation.     Vessels  The aorta root is normal. Dilation of the inferior vena cava is present with  normal respiratory variation in diameter. IVC diameter and respiratory changes  fall into an intermediate range suggesting an RA pressure of 8 mmHg.     Pericardium  No pericardial effusion is present.     Compared to Previous Study  There has been no change.  ______________________________________________________________________________  MMode/2D Measurements & Calculations  IVSd: 1.1 cm  LVIDd: 4.0 cm  LVIDs: 2.7 cm  LVPWd: 1.2 cm  FS: 32.2 %  LV mass(C)d: 163.4 grams  LV mass(C)dI: 76.8 grams/m2  Ao root diam: 3.6 cm  asc Aorta Diam: 3.8 cm  LVOT diam: 2.5 cm  LVOT area: 4.9 cm2  LA Volume (BP): 162.0 ml  LA Volume Index (BP): 76.1 ml/m2     RWT: 0.62     Doppler Measurements & Calculations  MV E max xander: 86.8 cm/sec  MV dec slope: 571.7 cm/sec2  Ao V2 mean: 92.2 cm/sec  Ao mean PG: 3.8 mmHg  Ao V2 VTI: 24.0 cm  CANDIE(I,D): 3.6 cm2  LV V1 VTI: 17.8 cm  SV(LVOT): 87.1 ml  SI(LVOT): 40.9 ml/m2  PA acc time: 0.14 sec  TR max xander: 231.0 cm/sec  TR max P.3 mmHg  CANDIE Index (cm2/m2): 1.7  E/E' av.0  Lateral E/e': 6.6  Medial E/e': 9.4     ______________________________________________________________________________  Report approved by: Cem Fabian 2022 01:37 PM           Medications       calcium carbonate-vitamin D  1 tablet Oral BID     diltiazem ER COATED BEADS  120 mg  Oral Daily     folic acid  5 mg Oral Daily     lactulose  20 g Oral TID     pantoprazole  40 mg Oral Daily     predniSONE  40 mg Oral Daily     rifaximin  550 mg Oral BID     sodium chloride (PF)  3 mL Intracatheter Q8H     sodium chloride (PF)  3 mL Intracatheter Q8H     vancomycin  125 mg Oral 4x Daily     zinc sulfate  220 mg Oral Daily

## 2022-12-21 NOTE — LETTER
PHYSICIAN ORDERS      DATE & TIME ISSUED: 2022 12:02 PM  PATIENT NAME: Damion Quinones   : 1955     [facility] MR# : [id number]     DIAGNOSIS:  Cirrhosis  ICD-10 CODE: K70.31  Orders  1 year after issue.    We kindly request your assistance with the following for mutual patient to complete locally, needed as part of liver transplant evaluation:     Physical therapy referral- evaluate and treat;       PLEASE FAX RESULTS AS SOON AS POSSIBLE TO [phone], ATTN:[place]    FOR CLINICAL QUESTIONS, PLEASE CALL Marysol Saha RN, at [phone]    .

## 2022-12-21 NOTE — TELEPHONE ENCOUNTER
Spoke with Dejah and Casey    Discharge today    Needs cardiac MRI (non contrast) and DSE per inpatient cardiology notes.  Had 2D echo while inpatient.  Has cards consult scheduled as outpatient next week.  Sent to scheduling, patient/wife also to call imaging scheduling to see if can get scheduled ASAP.    Needs sleep study, their team reached out 12/7, patient/wife to call to schedule    Needs neuro, patient will try and schedule (ordered and also sent to scheduling)    Nutrition scheduled for 12/27.     Outpatient PT referral ordered, also sent to PCP for local PT options.

## 2022-12-22 NOTE — DISCHARGE SUMMARY
[facility]  Hospitalist Discharge Summary      Date of Admission:  12/16/2022  Date of Discharge:  12/21/2022 10:38 AM  Discharging Provider: Tor Castillo MD  Discharge Service: Hospitalist Service, GOLD TEAM 11    Discharge Diagnoses   C diff Colitis   PATI on CKD  Hyperkalemia secondary to PATI on CKD  Hyponatremia   ESLD  R 2nd MCP Gout     Follow-ups Needed After Discharge   Follow-up Appointments     Adult [facility] Follow-up and recommended labs and tests      Follow up with Nephrology, within 7 days  for hospital follow- up. The   following labs/tests are recommended: BMP to check Cr.    Appointments on [place] and/or [facility] (with [facility] or [facility]   provider or service). Call [phone] if you haven't heard regarding   these appointments within 7 days of discharge.             Unresulted Labs Ordered in the Past 30 Days of this Admission     No orders found from 11/16/2022 to 12/17/2022.          Discharge Disposition   Discharged to home  Condition at discharge: Good    Hospital Course   Damion Quinones is a 67 year old man admitted on 12/16/2022. He has a history of alcoholic cirrhosis c/b hepatic encephalopathy, esophageal varices, TIPS procedure as well as paroxysmal atrial fibrillation, CKD, psoriatic arthritis, hypertension and ANCA vasculitis who is admitted with PATI in the setting of CKD.     Changes Today:  - Resumed Lactulose prior to discharge due BM's become more solid and less frequent   - Rheumatology consult due to lack of improvement of R 2nd MCP; swelling/tenderness S/p MCP arthrocentesis with improvement of symptoms  -Cr improving with gentle hydration, p.o. intake, and holding nephrotoxic agents.     # PATI on CKD - improving  Presents with Cr worsening from 4.52 one month prior to 6.5 on day of admission in the setting of three days of frequent watery diarrhea, found to have C diff. Likely pre-renal in the setting of diarrhea and  continued diuretic and lactulose use (lactulose was held for 1 day), but could be ATN. His renal biopsy on 9/9/2022 was read as suspicious for secondary IgA nephropathy but could not exclude un-sampled vasculitis. Cr slowly improving with gentle hydration.   - Nephrology consult  - Hold bumex and spironolactone  - Additional 1L crystalloid today to keep up with stool loses  - BID BMP  - Resumed lactuose now that diarrhea has resolved  - No urgent indication for dialysis currently     # Cdiff colitis  Watery, frequent and ongoing x3 days.  On antibiotics 2 weeks prior. Cdiff positive. Enteric panel negative.  - Continue vancomycin 125mg QID  - Restarted lactulose as diarrhea slowed down and pt wanting to prevent HE.  - Holding diuretics     # Hyperkalemia secondary to PATI on CKD  Slightly up at 5.7. Improved with shifting, but then rising again. No stable.  - Completed lokelma 10mg TID for 2 days (end 12/18).     # Hyponatremia - resolved  Mildly low and presumably secondary to hypovolemia although cirrhosis could be playing a role. Improved with rehydration.  - Montior     # ESLD  Meld-NA 26 today which is worsened compared to prior.  Prior complications include varices w/ recent emergent TIPS, encephalopathy and ascites.  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  He is undergoing liver/kidney transplant evaluation and has his cardiology visit planned for 12/27.  - Consult hepatology  - Resume lactulose, hold if >3 stools.  - Holding bumex/spironolactone  - Continue home rifaximin     # R 2nd MCP Gout   Presents with swollen and tender R 2nd knuckle swelling.  - Continue prednisone to 40 mg qday  - Rheumatology consult s/p arthrocentesis   - continue PTA prednisone f/u w/ amb rheumatology      # History of psoriatic arthritis, ANCA vasculitis  - Hold home prednisone:  Currently on prednisone 5 mg.  Patient plans to lower this to 2.5 mg next week per his rheumatologist.  - Start 40 mg  prednisone for gout flare as above    # Risk Stratification for possible transplant  - Cardiology consulted appreciate recommendations  -Cardiology recommend dobutamine stress echo, Noncon cardiac MRI to rule out hemochromatosis  -Patient would like to discharge as soon as possible and cardiology believes this evaluation can occur either inpatient or outpatient  - Patient has outpatient cardiology appointment on 12/27      Consultations This Hospital Stay   NEPHROLOGY GENERAL ADULT IP CONSULT  GI HEPATOLOGY ADULT IP CONSULT  NURSING TO CONSULT FOR VASCULAR ACCESS CARE IP CONSULT  CARE MANAGEMENT / SOCIAL WORK IP CONSULT  RHEUMATOLOGY IP CONSULT  RHEUMATOLOGY IP CONSULT  CARDIOLOGY GENERAL ADULT IP CONSULT  PHYSICAL THERAPY ADULT IP CONSULT    Code Status   Prior    Time Spent on this Encounter   I, Tor Castillo MD, personally saw the patient today and spent greater than 30 minutes discharging this patient.       Tor Castillo MD  [facility] [address] [place] [id number]  Phone: [phone]  ______________________________________________________________________    Physical Exam   Vital Signs:                   Weight: 220 lbs 4.8 oz       Primary Care Physician   Physician No Ref-Primary    Discharge Orders      Reason for your hospital stay    C. Diff     Activity    Your activity upon discharge: activity as tolerated     Adult [facility] Follow-up and recommended labs and tests    Follow up with Nephrology, within 7 days  for hospital follow- up. The following labs/tests are recommended: BMP to check Cr.    Appointments on [place] and/or [facility] (with [facility] or [facility] provider or service). Call [phone] if you haven't heard regarding these appointments within 7 days of discharge.     Diet    Follow this diet upon discharge: Orders Placed This Encounter      Combination Diet Regular Diet Adult       Significant Results and Procedures   Results for orders  placed or performed during the hospital encounter of 22   US Renal Complete Non-Vascular    Narrative    EXAMINATION: US RENAL COMPLETE NON-VASCULAR, 2022 6:05 PM     COMPARISON: Ultrasound 2022    HISTORY: PATI on CKD evaluation    TECHNIQUE: The kidneys and bladder were scanned in the standard  fashion with specialized ultrasound transducer(s) using both gray  scale and limited color/spectral Doppler techniques.    FINDINGS:    Right kidney: Measures 9.3 cm in length. Parenchyma is of normal  echogenicity. No focal mass. No hydronephrosis.    Left kidney: Measures 9.5 cm in length. Parenchyma is of normal  echogenicity. No focal mass. No hydronephrosis. Decreased size of the  hypoechoic subcapsular collection measuring 6.2 x 4.6 x 3.1 cm,  previously 7.5 x 4.9 x 2.6 cm.    Bladder: Moderately distended and within normal limits.      Impression    IMPRESSION:  1.  Decreased size of the left renal subcapsular collection,  presumably resolving subcapsular hematoma from prior renal biopsy.  2.  No hydronephrosis.    I have personally reviewed the examination and initial interpretation  and I agree with the findings.    MARK ALVAREZ MD         SYSTEM ID:  [id number]   Echocardiogram Complete     Value    LVEF  55-60%    Narrative    [id number]  [id number]  [id number]  [id number]^SHANNA^ANIBAL     [facility]  Echocardiography Laboratory  [address] [place] [address]     Name: EMILIANA SCHREIBER  MRN: [id number]  : 1955  Study Date: 2022 10:25 AM  Age: 67 yrs  Gender: Male  Patient Location: [facility]  Reason For Study: Transplant - pre Liver  Ordering Physician: ANIBAL OTERO  Performed By: Marisela Bro RDCS     BSA: 2.1 m2  Height: 68 in  Weight: 220 lb  BP: 128/75 mmHg  ______________________________________________________________________________  Procedure  Echocardiogram with two-dimensional, color and spectral Doppler performed.  Contrast Optison.  Optison (NDC #8578-4768-99) given intravenously. Patient was  given 5 ml mixture of 3 ml Optison and 6 ml saline. 4 ml wasted. The patient's  rhythm is atrial fibrillation.  ______________________________________________________________________________  Interpretation Summary  The patient's rhythm is atrial fibrillation.  Global and regional left ventricular function is normal with an EF of 55-60%.  Global right ventricular function is normal.  IVC diameter and respiratory changes fall into an intermediate range  suggesting an RA pressure of 8 mmHg.  No pericardial effusion is present.  There has been no change.  ______________________________________________________________________________  Left Ventricle  Global and regional left ventricular function is normal with an EF of 55-60%.  Left ventricular wall thickness is normal. Left ventricular size is normal.  Diastolic function not assessed due to atrial fibrillation.     Right Ventricle  Global right ventricular function is normal. Mild right ventricular dilation  is present.     Atria  Severe left atrial enlargement is present. Moderate right atrial enlargement  is present.     Mitral Valve  Mild mitral annular calcification is present. Trace mitral insufficiency is  present.     Aortic Valve  The aortic valve is tricuspid. Moderate aortic valve calcification is present.  On Doppler interrogation, there is no significant stenosis or regurgitation.  The mean AoV pressure gradient is 3.8 mmHg. The calculated aortic valve are is  3.6 cm^2.     Tricuspid Valve  The tricuspid valve is normal. The right ventricular systolic pressure is  approximated at 21.3 mmHg plus the right atrial pressure.     Pulmonic Valve  On Doppler interrogation, there is no significant stenosis or regurgitation.     Vessels  The aorta root is normal. Dilation of the inferior vena cava is present with  normal respiratory variation in diameter. IVC diameter and respiratory changes  fall into an  intermediate range suggesting an RA pressure of 8 mmHg.     Pericardium  No pericardial effusion is present.     Compared to Previous Study  There has been no change.  ______________________________________________________________________________  MMode/2D Measurements & Calculations  IVSd: 1.1 cm  LVIDd: 4.0 cm  LVIDs: 2.7 cm  LVPWd: 1.2 cm  FS: 32.2 %  LV mass(C)d: 163.4 grams  LV mass(C)dI: 76.8 grams/m2  Ao root diam: 3.6 cm  asc Aorta Diam: 3.8 cm  LVOT diam: 2.5 cm  LVOT area: 4.9 cm2  LA Volume (BP): 162.0 ml  LA Volume Index (BP): 76.1 ml/m2     RWT: 0.62     Doppler Measurements & Calculations  MV E max xander: 86.8 cm/sec  MV dec slope: 571.7 cm/sec2  Ao V2 mean: 92.2 cm/sec  Ao mean PG: 3.8 mmHg  Ao V2 VTI: 24.0 cm  CANDIE(I,D): 3.6 cm2  LV V1 VTI: 17.8 cm  SV(LVOT): 87.1 ml  SI(LVOT): 40.9 ml/m2  PA acc time: 0.14 sec  TR max xander: 231.0 cm/sec  TR max P.3 mmHg  CANDIE Index (cm2/m2): 1.7  E/E' av.0  Lateral E/e': 6.6  Medial E/e': 9.4     ______________________________________________________________________________  Report approved by: Cem Fabian 2022 01:37 PM               Discharge Medications   Discharge Medication List as of 2022 10:17 AM      START taking these medications    Details   vancomycin (VANCOCIN) 125 MG capsule Take 1 capsule (125 mg) by mouth 4 times daily, Disp-6 capsule, R-0, E-Prescribe         CONTINUE these medications which have NOT CHANGED    Details   bumetanide (BUMEX) 1 MG tablet 2 mg 2 times daily, Historical      Calcium Carbonate-Vitamin D 500-3.125 MG-MCG TABS Take 1 tablet by mouth 2 times daily, Historical      diltiazem ER (DILT-XR) 120 MG 24 hr capsule Take 120 mg by mouth, Historical      folic acid (FOLVITE) 1 MG tablet TAKE FIVE TABLETS BY MOUTH EVERY DAY, Historical      lactulose (CHRONULAC) 10 GM/15ML solution TAKE 45 ML BY MOUTH THREE TIMES DAILY, Historical      multivitamin w/minerals (THERA-VIT-M) tablet Take 1 tablet by mouth daily,  Historical      pantoprazole (PROTONIX) 40 MG EC tablet Take 20 mg by mouth daily, Historical      predniSONE (DELTASONE) 5 MG tablet Take 10 mg by mouth, Historical      spironolactone (ALDACTONE) 25 MG tablet Take 25 mg by mouth 2 times daily, Historical      XIFAXAN 550 MG TABS tablet Take 550 mg by mouth 2 times daily, KEITH, Historical      Zinc Sulfate 220 (50 Zn) MG TABS Take 220 mg by mouth, Historical           Allergies   No Known Allergies

## 2022-12-26 ENCOUNTER — TELEPHONE (OUTPATIENT)
Dept: TRANSPLANT | Facility: CLINIC | Age: 67
End: 2022-12-26

## 2022-12-26 DIAGNOSIS — I48.91 ATRIAL FIBRILLATION (H): Primary | ICD-10-CM

## 2022-12-26 NOTE — TELEPHONE ENCOUNTER
Patient was scheduled for DSE tomorrow, however patient has hx of AFib    Rescheduled to be done as lexiscan, tentatively scheduled for 12/29.  Pateint will check with wife and has number to call imaging scheduling if this time does not work.      Reviewed all appointments for tomorrow.    Reviewed that no caffeine for 12 hrs prior to lexiscan, npo for 3 hrs prior to test.  Patient verbailized understanding Patient does not drink etoh or smoke, aware of the prep instructions.  Patient not on a betablocker.

## 2022-12-27 ENCOUNTER — ANCILLARY PROCEDURE (OUTPATIENT)
Dept: BONE DENSITY | Facility: CLINIC | Age: 67
End: 2022-12-27
Attending: INTERNAL MEDICINE
Payer: COMMERCIAL

## 2022-12-27 ENCOUNTER — OFFICE VISIT (OUTPATIENT)
Dept: CARDIOLOGY | Facility: CLINIC | Age: 67
End: 2022-12-27
Attending: INTERNAL MEDICINE
Payer: COMMERCIAL

## 2022-12-27 ENCOUNTER — LAB (OUTPATIENT)
Dept: LAB | Facility: CLINIC | Age: 67
End: 2022-12-27
Payer: COMMERCIAL

## 2022-12-27 ENCOUNTER — PRE VISIT (OUTPATIENT)
Dept: CARDIOLOGY | Facility: CLINIC | Age: 67
End: 2022-12-27

## 2022-12-27 ENCOUNTER — ALLIED HEALTH/NURSE VISIT (OUTPATIENT)
Dept: TRANSPLANT | Facility: CLINIC | Age: 67
End: 2022-12-27
Attending: INTERNAL MEDICINE
Payer: COMMERCIAL

## 2022-12-27 VITALS
HEIGHT: 66 IN | HEART RATE: 87 BPM | WEIGHT: 215.4 LBS | OXYGEN SATURATION: 99 % | SYSTOLIC BLOOD PRESSURE: 134 MMHG | BODY MASS INDEX: 34.62 KG/M2 | DIASTOLIC BLOOD PRESSURE: 80 MMHG

## 2022-12-27 DIAGNOSIS — N18.5 CKD (CHRONIC KIDNEY DISEASE) STAGE 5, GFR LESS THAN 15 ML/MIN (H): Primary | ICD-10-CM

## 2022-12-27 DIAGNOSIS — Z76.82 ORGAN TRANSPLANT CANDIDATE: ICD-10-CM

## 2022-12-27 DIAGNOSIS — K70.31 ALCOHOLIC CIRRHOSIS OF LIVER WITH ASCITES (H): ICD-10-CM

## 2022-12-27 DIAGNOSIS — R06.83 SNORING: ICD-10-CM

## 2022-12-27 DIAGNOSIS — R93.1 ABNORMAL FINDINGS ON DIAGNOSTIC IMAGING OF HEART AND CORONARY CIRCULATION: ICD-10-CM

## 2022-12-27 DIAGNOSIS — K70.30 CIRRHOSIS, ALCOHOLIC (H): ICD-10-CM

## 2022-12-27 DIAGNOSIS — K70.30 ALCOHOLIC CIRRHOSIS (H): ICD-10-CM

## 2022-12-27 DIAGNOSIS — N18.5 CHRONIC KIDNEY DISEASE, STAGE 5, KIDNEY FAILURE (H): ICD-10-CM

## 2022-12-27 DIAGNOSIS — K70.30 ALCOHOLIC CIRRHOSIS OF LIVER WITHOUT ASCITES (H): Primary | ICD-10-CM

## 2022-12-27 LAB
ALBUMIN SERPL BCG-MCNC: 3.2 G/DL (ref 3.5–5.2)
ALP SERPL-CCNC: 202 U/L (ref 40–129)
ALT SERPL W P-5'-P-CCNC: 52 U/L (ref 10–50)
ANION GAP SERPL CALCULATED.3IONS-SCNC: 25 MMOL/L (ref 7–15)
AST SERPL W P-5'-P-CCNC: 41 U/L (ref 10–50)
BILIRUB SERPL-MCNC: 0.9 MG/DL
BUN SERPL-MCNC: 83.4 MG/DL (ref 8–23)
CALCIUM SERPL-MCNC: 9.5 MG/DL (ref 8.8–10.2)
CHLORIDE SERPL-SCNC: 99 MMOL/L (ref 98–107)
CREAT SERPL-MCNC: 5.15 MG/DL (ref 0.67–1.17)
DEPRECATED HCO3 PLAS-SCNC: 13 MMOL/L (ref 22–29)
ERYTHROCYTE [DISTWIDTH] IN BLOOD BY AUTOMATED COUNT: 14.8 % (ref 10–15)
GFR SERPL CREATININE-BSD FRML MDRD: 12 ML/MIN/1.73M2
GLUCOSE SERPL-MCNC: 121 MG/DL (ref 70–99)
HCT VFR BLD AUTO: 38.9 % (ref 40–53)
HGB BLD-MCNC: 12.7 G/DL (ref 13.3–17.7)
INR PPP: 1.07 (ref 0.85–1.15)
MCH RBC QN AUTO: 29.7 PG (ref 26.5–33)
MCHC RBC AUTO-ENTMCNC: 32.6 G/DL (ref 31.5–36.5)
MCV RBC AUTO: 91 FL (ref 78–100)
PLATELET # BLD AUTO: 160 10E3/UL (ref 150–450)
POTASSIUM SERPL-SCNC: 5 MMOL/L (ref 3.4–5.3)
PROT SERPL-MCNC: 6.1 G/DL (ref 6.4–8.3)
RBC # BLD AUTO: 4.27 10E6/UL (ref 4.4–5.9)
SODIUM SERPL-SCNC: 137 MMOL/L (ref 136–145)
WBC # BLD AUTO: 15.5 10E3/UL (ref 4–11)

## 2022-12-27 PROCEDURE — 85610 PROTHROMBIN TIME: CPT | Performed by: PATHOLOGY

## 2022-12-27 PROCEDURE — G0463 HOSPITAL OUTPT CLINIC VISIT: HCPCS | Performed by: INTERNAL MEDICINE

## 2022-12-27 PROCEDURE — 77080 DXA BONE DENSITY AXIAL: CPT | Performed by: INTERNAL MEDICINE

## 2022-12-27 PROCEDURE — 36415 COLL VENOUS BLD VENIPUNCTURE: CPT | Performed by: PATHOLOGY

## 2022-12-27 PROCEDURE — 85027 COMPLETE CBC AUTOMATED: CPT | Performed by: PATHOLOGY

## 2022-12-27 PROCEDURE — G0463 HOSPITAL OUTPT CLINIC VISIT: HCPCS

## 2022-12-27 PROCEDURE — 99204 OFFICE O/P NEW MOD 45 MIN: CPT | Performed by: INTERNAL MEDICINE

## 2022-12-27 PROCEDURE — 80053 COMPREHEN METABOLIC PANEL: CPT | Performed by: PATHOLOGY

## 2022-12-27 RX ORDER — POTASSIUM CHLORIDE 1.5 G/1.58G
20 POWDER, FOR SOLUTION ORAL 2 TIMES DAILY
COMMUNITY
End: 2023-02-01

## 2022-12-27 ASSESSMENT — PAIN SCALES - GENERAL: PAINLEVEL: NO PAIN (0)

## 2022-12-27 NOTE — LETTER
Date:December 28, 2022      Patient was self referred, no letter generated. Do not send.        [facility] Health Information

## 2022-12-27 NOTE — PROGRESS NOTES
SUBJECTIVE:  Damion Quinones is a 67 year old male who presents for liver and kidney transplant evaluation.  Patient with history of alcoholic cirrhosis, hepatic encephalopathy, status post TIPS, CKD, psoriatic arthritis, and ANCA positive vasculitis.  Current GFR is 12.  Patient is not on dialysis.  Had a recent admission with C. difficile colitis with worsening creatinine.  Patient's cardiac history is significant for atrial fibrillation.  Diagnosed 6 years ago but as he is asymptomatic unclear duration.  Currently patient is not on anticoagulation.  Not very active but denied cardiac symptoms.  He has no significant cardiac risk factors.  No diabetes or hypertension.  Diabetes is mentioned in the problem list.  But patient is not on any medication.  Never smoked.  No premature coronary artery disease in family.  LDL is 187 with normal HDL.    Patient Active Problem List    Diagnosis Date Noted     Acute kidney injury superimposed on chronic kidney disease (H) 12/16/2022     Priority: Medium     Chronic kidney disease 12/07/2022     Priority: Medium     Psoriatic arthritis (H) 12/07/2022     Priority: Medium     PAF (paroxysmal atrial fibrillation) (H) 12/07/2022     Priority: Medium     Hypokalemia 12/07/2022     Priority: Medium     Morbid obesity (H) 12/07/2022     Priority: Medium     Obesity 11/23/2022     Priority: Medium     Alcoholic cirrhosis of liver with ascites (H) 10/11/2019     Priority: Medium     History of hemochromatosis 10/11/2019     Priority: Medium     Diabetes mellitus type 2, uncomplicated (H) 10/11/2019     Priority: Medium    .  Current Outpatient Medications   Medication Sig     bumetanide (BUMEX) 1 MG tablet 2 mg 2 times daily     Calcium Carbonate-Vitamin D 500-3.125 MG-MCG TABS Take 1 tablet by mouth 2 times daily     diltiazem ER (DILT-XR) 120 MG 24 hr capsule Take 120 mg by mouth     folic acid (FOLVITE) 1 MG tablet TAKE FIVE TABLETS BY MOUTH EVERY DAY     lactulose (CHRONULAC) 10  GM/15ML solution TAKE 45 ML BY MOUTH THREE TIMES DAILY     multivitamin w/minerals (THERA-VIT-M) tablet Take 1 tablet by mouth daily     pantoprazole (PROTONIX) 40 MG EC tablet Take 20 mg by mouth daily     potassium chloride (KLOR-CON) 20 MEQ packet Take 20 mEq by mouth 2 times daily     predniSONE (DELTASONE) 5 MG tablet Take 10 mg by mouth     spironolactone (ALDACTONE) 25 MG tablet Take 25 mg by mouth 2 times daily     vancomycin (VANCOCIN) 125 MG capsule Take 1 capsule (125 mg) by mouth 4 times daily     XIFAXAN 550 MG TABS tablet Take 550 mg by mouth 2 times daily     Zinc Sulfate 220 (50 Zn) MG TABS Take 220 mg by mouth     No current facility-administered medications for this visit.     Past Medical History:   Diagnosis Date     Alcoholic cirrhosis of liver with ascites (H) 10/11/2019     Arthritis     RA     Chronic kidney disease      Coronary artery disease     AFib 2016     Diabetes mellitus type 2, uncomplicated (H) 10/11/2019     History of blood transfusion     2016     History of hemochromatosis 10/11/2019     Hypertension      Hypokalemia      Obesity      PAF (paroxysmal atrial fibrillation) (H)      Psoriatic arthritis (H)      Past Surgical History:   Procedure Laterality Date     APPENDECTOMY      Removed at 18 Years Old      COLONOSCOPY      2014 at [facility]      EYE SURGERY Bilateral     Cataract     GI SURGERY      TIPS     HERNIA REPAIR      History of bilateral inguinal hernia repair: 10/28/2014. Open hernia repair: 10/2017. Abdominal wound exploration and debridement 12/27/2017     No Known Allergies  Social History     Socioeconomic History     Marital status:      Spouse name: Not on file     Number of children: Not on file     Years of education: Not on file     Highest education level: Not on file   Occupational History     Not on file   Tobacco Use     Smoking status: Never     Smokeless tobacco: Never   Substance and Sexual Activity     Alcohol use: Not Currently      "Comment: Last ETOH use was 12/31/2021     Drug use: Not Currently     Sexual activity: Not on file   Other Topics Concern     Parent/sibling w/ CABG, MI or angioplasty before 65F 55M? Not Asked   Social History Narrative     Not on file     Social Determinants of Health     Financial Resource Strain: Not on file   Food Insecurity: Not on file   Transportation Needs: Not on file   Physical Activity: Not on file   Stress: Not on file   Social Connections: Not on file   Intimate Partner Violence: Not on file   Housing Stability: Not on file     Family History   Problem Relation Age of Onset     Lung Cancer Mother      Colon Cancer Father      Lung Cancer Brother      Heart Failure Brother      Kidney Disease Brother           REVIEW OF SYSTEMS:  General: negative, fever, chills, night sweats  Skin: negative, acne, rash and scaling  Eyes: negative, double vision, eye pain and photophobia  Ears/Nose/Throat: negative, nasal congestion and purulent rhinorrhea  Respiratory: No dyspnea on exertion, No cough, No hemoptysis and negative  Cardiovascular: negative, palpitations, tachycardia, irregular heart beat, chest pain, exertional chest pain or pressure, paroxysmal nocturnal dyspnea, dyspnea on exertion and orthopnea       OBJECTIVE:  Blood pressure 134/80, pulse 87, height 1.665 m (5' 5.55\"), weight 97.7 kg (215 lb 6.4 oz), SpO2 99 %.  General Appearance: alert and no distress  Head: Normocephalic. No masses, lesions, tenderness or abnormalities  Eyes: conjuctiva clear, PERRL, EOM intact  Ears: External ears normal. Canals clear. TM's normal.  Nose: Nares normal  Mouth: normal  Neck: Supple, no cervical adenopathy, no thyromegaly  Lungs: clear to auscultation  Cardiac: regular rate and rhythm, normal S1 and S2, no murmur       ASSESSMENT/PLAN:  Patient here for liver and kidney transplant evaluation.  Alcoholic cirrhosis with CKD stage V.  Current GFR is 12.  Unclear etiology for CKD possibly hepatorenal syndrome/ANCA " positive vasculitis.  Patient had a recent hospitalization with C. difficile colitis and dehydration and elevation of creatinine.  Since discharge she is doing okay.  Currently he is not very active but denied cardiac symptoms.  His only cardiac risk factor is LDL of 187.  Never smoked.  No hypertension or diabetes.  No premature coronary artery disease in family.  Patient do have a diagnosis of hemochromatosis in problem list.  Transplant team has requested a cardiac MRI.  Reviewed EKGs.  Atrial fibrillation.  Otherwise unremarkable.  Echocardiogram reviewed.  Normal biventricular function.  Severe left atrial enlargement.  No significant valvular abnormalities.  Normal PA pressure.  As patient is in atrial fibrillation the dobutamine or exercise stress echo will be inadequate.  He do have an existing order for a Lexiscan which is scheduled for this Thursday.  If this is normal patient will be able to proceed with the transplant.  Total visit duration 45 minutes.  This included face-to-face interview, physical exam, chart review, review of EKGs, echocardiogram and documentation.  Reviewed stress test. Normal. Patient may proceed with transplant.

## 2022-12-27 NOTE — NURSING NOTE
Chief Complaint   Patient presents with     New Patient     Liver transplant eval           Vitals were taken and medications reconciled.     Fernandez Dooley, EMT   10:48 AM     DEFER- PT ordered to issue periumbilical belt for hernia support. Discussed with OT, recomment trial of abdominal binder for compression/support. Ifadditional support is needed, encouraged OP PT to fit for periumbilical hernia belt. Per OT, pt continues to be up ambulating without assist, no IP PT needs. PT order completed.

## 2022-12-27 NOTE — PATIENT INSTRUCTIONS
Sources of Protein  Below is a list of high protein foods. Your goal is 75-90 grams per day.  For animal proteins (chicken, beef, fish), deck of cards size is approximately 3 ounces or 24 grams protein.  Food Portion  Grams of Protein   Animal proteins (chicken, turkey, venison, fish/seafood, red meat) 1 ounce  7-9   Egg  1  6   Cottage cheese  1/4 cup  7    Cheese  1 ounce (1 slice, 1 cheese stick) 6   Milk (cow's) 4 ounces or 1/2 cup  4   Dry skim milk powder 2 Tbsp  4   Ricotta cheese  1/4 cup  7    Triangle Instant Breakfast (made with milk) 1/2 cup  7   Pudding 1/2 cup  4   Yogurt (ie Yoplait) 1/2 cup  5   Greek yogurt 5 oz container 15   Tofu  1/4 cup  5   Soymilk  1/2 cup  3   Tempeh  1/4 cup  8   Lentils  1/4 cup 5   Kidney beans, black beans, etc 1/4 cup  4   Chickpeas 1/4 cup  4   Veggie burger  1 dalila  7   Soy nuts  1/4 cup  17   Sunflower seeds  2 Tbsp  8   Peanuts  1 ounce 7   Almonds  15 6   Pistachios 25 6   Peanut/almond butter 2 Tbsp  8      Protein bars, ready-to-drink products (ie Premier Protein, Boost, Ensure, etc), or protein powders are other options to supplement your protein intake.

## 2022-12-27 NOTE — PROGRESS NOTES
[facility] Solid Organ Transplant  Outpatient MNT: Liver Kidney Transplant Evaluation    Current BMI: 35.5 (HT 67 in,  lbs/103 kg)- data from 11/22   BMI is within recommendation of <45 for liver transplant  BMI is within recommendation of <35 for kidney transplant     Fried Frailty-- Frail (3/5 points)- reported exhaustion, reduced , low activity level  * Test completed 11/22/22 scored 4/5 points- slowness/walk test has improved    FraiLT-- Pre frail   [email or web address]    Recommended Interventions to Address Frailty:  - Pt has plans to set up OP PT  - Focus on dietary protein intake      Time Spent: 30 minutes  Visit Type: Initial   Referring Physician: Dao   Pt accompanied by: his wife, Apoorva    History of previous txp: none     Nutrition Assessment  Pt was recently admitted and has been home just a few days. He is not on dialysis.   Pt reports when he was here for liver eval in Nov, he was educated on following a LS, high protein diet. He started 1 protein shake/day for a few days, then became admitted with encephalopathy, constipation, etc and has somewhat correlated protein drinks with this admission.     - Appetite: up/down, better the last few days since being home   - Food allergies/intolerances: none  - Meal prep & grocery shopping: Apoorva does  - Issues chewing or swallowing: no  - N/V/D/C: no  - Food access concerns: no     Vitamins, Supplements, Pertinent Meds: calcium vit D, folic acid, MVI, zinc, potassium   Herbal Medicines/Supplements: none   Protein supplement: occasional HP drink    Edema: + JESSICA (has improved w/ diuretic)    Weight hx: prior  lbs; notes some muscle wasting (mild/moderate)    Diet Recall  Breakfast 1-2 egg, 2 toast w/ butter (no salt); less often blueberry pancake    Lunch Pasta w/ no salt ketchup; leftovers   Dinner Burgers; hot dish w/ rice & ground beef with liquid made from scratch; homemade soup; usually has some protein at night   "  Snacks Animal crackers, fruit snacks, grapes/fruit, sherbet; Greek yogurt w/ granola    Beverages Water, 8 oz apple juice, 20 oz mixed with chocolate & white milks    Dining out Asks for LS, etc, 1x/week at most     Physical Activity  No planned activity   Could walk 1 block \"in the right conditions\" ie w/o snow & ice  Setting up OP PT for 2x/week   Hunting, fishing, etc <1 year ago     Anthropometrics  IBW/%IBW: 148/142  Dosing wt: 164 lbs/74 kg    Estimated Nutrition Needs  Protein: 75-90 grams/day (1-1.2 g/kg) for increased needs w/ liver disease and reduced GFR     Nutrition Diagnosis  Predicted suboptimal nutrient intake (protein) r/t increased needs w/ liver disease.     Nutrition Intervention  Nutrition education provided:  Discussed sodium intake (low sodium foods and drinks, seasoning food without salt and tips for low sodium diet).    Reviewed adequate protein intake. Encouraged receiving protein from both animal and plant based sources. Reviewed recommended range for intake and how different foods add up differently to protein goal. Provided list in AVS.   Reviewed importance of maintaining functional status and glad he is getting set up with PT.     Reviewed post txp diet guidelines in brief (will review in further detail post txp):  (1) Review of proper food safety measures d/t immunosuppressant therapy post-op and increased risk for food-borne illness    (2) Avoid the following post txp d/t risk for rejection, unknown effects on the organs, and/or potential interactions with immunosuppressants:  - Herbal, Chinese, holistic, chiropractic, natural, alternative medicines and supplements  - Detoxes and cleanses  - Weight loss pills  - Protein powders or other products with extracts or herbs (ie green tea extract)    (3) Med regimen and possible side effects    Patient Understanding: Pt verbalized understanding of education provided.  Expected Engagement: Good  Follow-Up Plans: PRN     Nutrition " Goals  Ideal minimum of 75 g protein/day    Nidhi Leal, OREN, LD, CCTD

## 2022-12-27 NOTE — LETTER
12/27/2022      RE: Damion Quinones   [address] [facility] [id number]       Dear Colleague,    Thank you for the opportunity to participate in the care of your patient, Damion Quinones, at the [facility] HEART CLINIC [place] [facility]. Please see a copy of my visit note below.       SUBJECTIVE:  Damion Quinones is a 67 year old male who presents for liver and kidney transplant evaluation.  Patient with history of alcoholic cirrhosis, hepatic encephalopathy, status post TIPS, CKD, psoriatic arthritis, and ANCA positive vasculitis.  Current GFR is 12.  Patient is not on dialysis.  Had a recent admission with C. difficile colitis with worsening creatinine.  Patient's cardiac history is significant for atrial fibrillation.  Diagnosed 6 years ago but as he is asymptomatic unclear duration.  Currently patient is not on anticoagulation.  Not very active but denied cardiac symptoms.  He has no significant cardiac risk factors.  No diabetes or hypertension.  Diabetes is mentioned in the problem list.  But patient is not on any medication.  Never smoked.  No premature coronary artery disease in family.  LDL is 187 with normal HDL.    Patient Active Problem List    Diagnosis Date Noted     Acute kidney injury superimposed on chronic kidney disease (H) 12/16/2022     Priority: Medium     Chronic kidney disease 12/07/2022     Priority: Medium     Psoriatic arthritis (H) 12/07/2022     Priority: Medium     PAF (paroxysmal atrial fibrillation) (H) 12/07/2022     Priority: Medium     Hypokalemia 12/07/2022     Priority: Medium     Morbid obesity (H) 12/07/2022     Priority: Medium     Obesity 11/23/2022     Priority: Medium     Alcoholic cirrhosis of liver with ascites (H) 10/11/2019     Priority: Medium     History of hemochromatosis 10/11/2019     Priority: Medium     Diabetes mellitus type 2, uncomplicated (H) 10/11/2019     Priority: Medium    .  Current Outpatient  Medications   Medication Sig     bumetanide (BUMEX) 1 MG tablet 2 mg 2 times daily     Calcium Carbonate-Vitamin D 500-3.125 MG-MCG TABS Take 1 tablet by mouth 2 times daily     diltiazem ER (DILT-XR) 120 MG 24 hr capsule Take 120 mg by mouth     folic acid (FOLVITE) 1 MG tablet TAKE FIVE TABLETS BY MOUTH EVERY DAY     lactulose (CHRONULAC) 10 GM/15ML solution TAKE 45 ML BY MOUTH THREE TIMES DAILY     multivitamin w/minerals (THERA-VIT-M) tablet Take 1 tablet by mouth daily     pantoprazole (PROTONIX) 40 MG EC tablet Take 20 mg by mouth daily     potassium chloride (KLOR-CON) 20 MEQ packet Take 20 mEq by mouth 2 times daily     predniSONE (DELTASONE) 5 MG tablet Take 10 mg by mouth     spironolactone (ALDACTONE) 25 MG tablet Take 25 mg by mouth 2 times daily     vancomycin (VANCOCIN) 125 MG capsule Take 1 capsule (125 mg) by mouth 4 times daily     XIFAXAN 550 MG TABS tablet Take 550 mg by mouth 2 times daily     Zinc Sulfate 220 (50 Zn) MG TABS Take 220 mg by mouth     No current facility-administered medications for this visit.     Past Medical History:   Diagnosis Date     Alcoholic cirrhosis of liver with ascites (H) 10/11/2019     Arthritis     RA     Chronic kidney disease      Coronary artery disease     AFib 2016     Diabetes mellitus type 2, uncomplicated (H) 10/11/2019     History of blood transfusion     2016     History of hemochromatosis 10/11/2019     Hypertension      Hypokalemia      Obesity      PAF (paroxysmal atrial fibrillation) (H)      Psoriatic arthritis (H)      Past Surgical History:   Procedure Laterality Date     APPENDECTOMY      Removed at 18 Years Old      COLONOSCOPY      2014 at [facility].      EYE SURGERY Bilateral     Cataract     GI SURGERY      TIPS     HERNIA REPAIR      History of bilateral inguinal hernia repair: 10/28/2014. Open hernia repair: 10/2017. Abdominal wound exploration and debridement 12/27/2017     No Known Allergies  Social History     Socioeconomic History  "    Marital status:      Spouse name: Not on file     Number of children: Not on file     Years of education: Not on file     Highest education level: Not on file   Occupational History     Not on file   Tobacco Use     Smoking status: Never     Smokeless tobacco: Never   Substance and Sexual Activity     Alcohol use: Not Currently     Comment: Last ETOH use was 12/31/2021     Drug use: Not Currently     Sexual activity: Not on file   Other Topics Concern     Parent/sibling w/ CABG, MI or angioplasty before 65F 55M? Not Asked   Social History Narrative     Not on file     Social Determinants of Health     Financial Resource Strain: Not on file   Food Insecurity: Not on file   Transportation Needs: Not on file   Physical Activity: Not on file   Stress: Not on file   Social Connections: Not on file   Intimate Partner Violence: Not on file   Housing Stability: Not on file     Family History   Problem Relation Age of Onset     Lung Cancer Mother      Colon Cancer Father      Lung Cancer Brother      Heart Failure Brother      Kidney Disease Brother           REVIEW OF SYSTEMS:  General: negative, fever, chills, night sweats  Skin: negative, acne, rash and scaling  Eyes: negative, double vision, eye pain and photophobia  Ears/Nose/Throat: negative, nasal congestion and purulent rhinorrhea  Respiratory: No dyspnea on exertion, No cough, No hemoptysis and negative  Cardiovascular: negative, palpitations, tachycardia, irregular heart beat, chest pain, exertional chest pain or pressure, paroxysmal nocturnal dyspnea, dyspnea on exertion and orthopnea       OBJECTIVE:  Blood pressure 134/80, pulse 87, height 1.665 m (5' 5.55\"), weight 97.7 kg (215 lb 6.4 oz), SpO2 99 %.  General Appearance: alert and no distress  Head: Normocephalic. No masses, lesions, tenderness or abnormalities  Eyes: conjuctiva clear, PERRL, EOM intact  Ears: External ears normal. Canals clear. TM's normal.  Nose: Nares normal  Mouth: normal  Neck: " Supple, no cervical adenopathy, no thyromegaly  Lungs: clear to auscultation  Cardiac: regular rate and rhythm, normal S1 and S2, no murmur       ASSESSMENT/PLAN:  Patient here for liver and kidney transplant evaluation.  Alcoholic cirrhosis with CKD stage V.  Current GFR is 12.  Unclear etiology for CKD possibly hepatorenal syndrome/ANCA positive vasculitis.  Patient had a recent hospitalization with C. difficile colitis and dehydration and elevation of creatinine.  Since discharge she is doing okay.  Currently he is not very active but denied cardiac symptoms.  His only cardiac risk factor is LDL of 187.  Never smoked.  No hypertension or diabetes.  No premature coronary artery disease in family.  Patient do have a diagnosis of hemochromatosis in problem list.  Transplant team has requested a cardiac MRI.  Reviewed EKGs.  Atrial fibrillation.  Otherwise unremarkable.  Echocardiogram reviewed.  Normal biventricular function.  Severe left atrial enlargement.  No significant valvular abnormalities.  Normal PA pressure.  As patient is in atrial fibrillation the dobutamine or exercise stress echo will be inadequate.  He do have an existing order for a Lexiscan which is scheduled for this Thursday.  If this is normal patient will be able to proceed with the transplant.  Total visit duration 45 minutes.  This included face-to-face interview, physical exam, chart review, review of EKGs, echocardiogram and documentation.      Please do not hesitate to contact me if you have any questions/concerns.     Sincerely,     ELY Jimenez MD

## 2022-12-29 ENCOUNTER — HOSPITAL ENCOUNTER (OUTPATIENT)
Age: 67
End: 2022-12-29
Attending: HOSPITALIST | Admitting: HOSPITALIST
Payer: COMMERCIAL

## 2022-12-29 ENCOUNTER — TELEPHONE (OUTPATIENT)
Dept: TRANSPLANT | Facility: CLINIC | Age: 67
End: 2022-12-29

## 2022-12-29 NOTE — TELEPHONE ENCOUNTER
UPDATE- still no bed availability her after speaking with admissions.      [facility] may [facility] up [facility] if [facility] as needs higher level of care.      Apoorva (spouse) will keep us posted.

## 2022-12-29 NOTE — TELEPHONE ENCOUNTER
Spoke with patient's wife Apoorva    Patient is 67 yr old male  currently in eval for SLK, was supposed to finish outpatient cardiac testing today (Lexiscan) but was brought to ER at [facility].     Patient's wife called 911 after profuse, sudden onset vomiting (See [place] note in CE).  Patient recently admitted here  for worsening kidney function, not yet initiated on dialysis but has been borderline for quite some time.  Current MELD 22.      Patient accepted for transfer but no current bed availability. Patient's wife had requested EMS bring him here for admission but EMS refused.

## 2023-01-04 ENCOUNTER — TELEPHONE (OUTPATIENT)
Dept: TRANSPLANT | Facility: CLINIC | Age: 68
End: 2023-01-04

## 2023-01-04 NOTE — TELEPHONE ENCOUNTER
Spoke with Casey    Doing overall well after recent admission in [place] and [facility]- see [facility] notes, had GAVE procedure.  Alert and oriented now, starting to work with physical therapy this week    Patient/wife Moved [facility] to next week, as had ED visit on 1/2 for gout flare. Told them that's fine, still has cardiac MRI pending at end of month,  Will review eval after both completed

## 2023-01-12 ENCOUNTER — HOSPITAL ENCOUNTER (OUTPATIENT)
Dept: NUCLEAR MEDICINE | Facility: CLINIC | Age: 68
Setting detail: NUCLEAR MEDICINE
Discharge: HOME | End: 2023-01-12
Attending: INTERNAL MEDICINE
Payer: COMMERCIAL

## 2023-01-12 ENCOUNTER — HOSPITAL ENCOUNTER (OUTPATIENT)
Dept: CARDIOLOGY | Facility: CLINIC | Age: 68
Discharge: HOME | End: 2023-01-12
Attending: INTERNAL MEDICINE
Payer: COMMERCIAL

## 2023-01-12 DIAGNOSIS — I48.91 ATRIAL FIBRILLATION (H): ICD-10-CM

## 2023-01-12 PROCEDURE — 93018 CV STRESS TEST I&R ONLY: CPT | Performed by: INTERNAL MEDICINE

## 2023-01-12 PROCEDURE — 93017 CV STRESS TEST TRACING ONLY: CPT

## 2023-01-12 PROCEDURE — 343N000001 HC RX 343: Performed by: INTERNAL MEDICINE

## 2023-01-12 PROCEDURE — 250N000011 HC RX IP 250 OP 636: Performed by: INTERNAL MEDICINE

## 2023-01-12 PROCEDURE — 78452 HT MUSCLE IMAGE SPECT MULT: CPT | Mod: 26 | Performed by: RADIOLOGY

## 2023-01-12 PROCEDURE — A9502 TC99M TETROFOSMIN: HCPCS | Performed by: INTERNAL MEDICINE

## 2023-01-12 PROCEDURE — 93016 CV STRESS TEST SUPVJ ONLY: CPT | Performed by: INTERNAL MEDICINE

## 2023-01-12 PROCEDURE — 999N000128 HC STATISTIC PERIPHERAL IV START W/O US GUIDANCE

## 2023-01-12 PROCEDURE — 78452 HT MUSCLE IMAGE SPECT MULT: CPT

## 2023-01-12 RX ORDER — ACYCLOVIR 200 MG/1
0-1 CAPSULE ORAL
Status: DISCONTINUED | OUTPATIENT
Start: 2023-01-12 | End: 2023-01-13 | Stop reason: HOSPADM

## 2023-01-12 RX ORDER — REGADENOSON 0.08 MG/ML
0.4 INJECTION, SOLUTION INTRAVENOUS ONCE
Status: COMPLETED | OUTPATIENT
Start: 2023-01-12 | End: 2023-01-12

## 2023-01-12 RX ORDER — AMINOPHYLLINE 25 MG/ML
50-100 INJECTION, SOLUTION INTRAVENOUS
Status: DISCONTINUED | OUTPATIENT
Start: 2023-01-12 | End: 2023-01-13 | Stop reason: HOSPADM

## 2023-01-12 RX ORDER — CAFFEINE CITRATE 20 MG/ML
60 SOLUTION INTRAVENOUS
Status: DISCONTINUED | OUTPATIENT
Start: 2023-01-12 | End: 2023-01-13 | Stop reason: HOSPADM

## 2023-01-12 RX ORDER — ALBUTEROL SULFATE 90 UG/1
2 AEROSOL, METERED RESPIRATORY (INHALATION) EVERY 5 MIN PRN
Status: DISCONTINUED | OUTPATIENT
Start: 2023-01-12 | End: 2023-01-13 | Stop reason: HOSPADM

## 2023-01-12 RX ADMIN — TETROFOSMIN 38 MCI.: 1.38 INJECTION, POWDER, LYOPHILIZED, FOR SOLUTION INTRAVENOUS at 10:04

## 2023-01-12 RX ADMIN — REGADENOSON 0.4 MG: 0.08 INJECTION, SOLUTION INTRAVENOUS at 10:00

## 2023-01-12 RX ADMIN — TETROFOSMIN 10.1 MCI.: 1.38 INJECTION, POWDER, LYOPHILIZED, FOR SOLUTION INTRAVENOUS at 08:45

## 2023-01-15 ENCOUNTER — APPOINTMENT (OUTPATIENT)
Dept: CT IMAGING | Facility: CLINIC | Age: 68
End: 2023-01-15
Attending: INTERNAL MEDICINE
Payer: COMMERCIAL

## 2023-01-15 ENCOUNTER — HOSPITAL ENCOUNTER (INPATIENT)
Facility: CLINIC | Age: 68
LOS: 1 days | Discharge: HOME | End: 2023-01-16
Attending: INTERNAL MEDICINE | Admitting: INTERNAL MEDICINE
Payer: COMMERCIAL

## 2023-01-15 DIAGNOSIS — K72.10 END-STAGE LIVER DISEASE (H): ICD-10-CM

## 2023-01-15 DIAGNOSIS — R10.84 ABDOMINAL PAIN, GENERALIZED: ICD-10-CM

## 2023-01-15 DIAGNOSIS — N17.9 ACUTE KIDNEY INJURY (H): ICD-10-CM

## 2023-01-15 DIAGNOSIS — E87.5 HYPERKALEMIA: ICD-10-CM

## 2023-01-15 DIAGNOSIS — Z20.822 CONTACT WITH AND (SUSPECTED) EXPOSURE TO COVID-19: ICD-10-CM

## 2023-01-15 DIAGNOSIS — A04.72 C. DIFFICILE COLITIS: Primary | ICD-10-CM

## 2023-01-15 DIAGNOSIS — D72.829 LEUKOCYTOSIS, UNSPECIFIED TYPE: ICD-10-CM

## 2023-01-15 LAB
ALBUMIN SERPL BCG-MCNC: 3.3 G/DL (ref 3.5–5.2)
ALBUMIN SERPL BCG-MCNC: 3.5 G/DL (ref 3.5–5.2)
ALBUMIN UR-MCNC: NEGATIVE MG/DL
ALP SERPL-CCNC: 187 U/L (ref 40–129)
ALP SERPL-CCNC: 197 U/L (ref 40–129)
ALT SERPL W P-5'-P-CCNC: 60 U/L (ref 10–50)
ALT SERPL W P-5'-P-CCNC: 65 U/L (ref 10–50)
ANION GAP SERPL CALCULATED.3IONS-SCNC: 20 MMOL/L (ref 7–15)
ANION GAP SERPL CALCULATED.3IONS-SCNC: 22 MMOL/L (ref 7–15)
APPEARANCE UR: CLEAR
AST SERPL W P-5'-P-CCNC: 36 U/L (ref 10–50)
AST SERPL W P-5'-P-CCNC: 37 U/L (ref 10–50)
BASOPHILS # BLD AUTO: 0 10E3/UL (ref 0–0.2)
BASOPHILS NFR BLD AUTO: 0 %
BILIRUB SERPL-MCNC: 0.9 MG/DL
BILIRUB SERPL-MCNC: 1.1 MG/DL
BILIRUB UR QL STRIP: NEGATIVE
BUN SERPL-MCNC: 113 MG/DL (ref 8–23)
BUN SERPL-MCNC: 116 MG/DL (ref 8–23)
C DIFF GDH STL QL IA: POSITIVE
C DIFF TOX A+B STL QL IA: POSITIVE
C DIFF TOX B STL QL: POSITIVE
CALCIUM SERPL-MCNC: 8.7 MG/DL (ref 8.8–10.2)
CALCIUM SERPL-MCNC: 9.6 MG/DL (ref 8.8–10.2)
CHLORIDE SERPL-SCNC: 97 MMOL/L (ref 98–107)
CHLORIDE SERPL-SCNC: 97 MMOL/L (ref 98–107)
COLOR UR AUTO: ABNORMAL
CREAT SERPL-MCNC: 6.33 MG/DL (ref 0.67–1.17)
CREAT SERPL-MCNC: 6.59 MG/DL (ref 0.67–1.17)
CRP SERPL-MCNC: 19.2 MG/L
DEPRECATED HCO3 PLAS-SCNC: 15 MMOL/L (ref 22–29)
DEPRECATED HCO3 PLAS-SCNC: 19 MMOL/L (ref 22–29)
EOSINOPHIL # BLD AUTO: 0 10E3/UL (ref 0–0.7)
EOSINOPHIL NFR BLD AUTO: 0 %
ERYTHROCYTE [DISTWIDTH] IN BLOOD BY AUTOMATED COUNT: 14.7 % (ref 10–15)
ERYTHROCYTE [SEDIMENTATION RATE] IN BLOOD BY WESTERGREN METHOD: 11 MM/HR (ref 0–20)
GFR SERPL CREATININE-BSD FRML MDRD: 9 ML/MIN/1.73M2
GFR SERPL CREATININE-BSD FRML MDRD: 9 ML/MIN/1.73M2
GLUCOSE SERPL-MCNC: 104 MG/DL (ref 70–99)
GLUCOSE SERPL-MCNC: 166 MG/DL (ref 70–99)
GLUCOSE UR STRIP-MCNC: NEGATIVE MG/DL
HCO3 BLDV-SCNC: 24 MMOL/L (ref 21–28)
HCT VFR BLD AUTO: 42.1 % (ref 40–53)
HGB BLD-MCNC: 13.5 G/DL (ref 13.3–17.7)
HGB UR QL STRIP: NEGATIVE
HOLD SPECIMEN: NORMAL
IMM GRANULOCYTES # BLD: 0.3 10E3/UL
IMM GRANULOCYTES NFR BLD: 1 %
INR PPP: 1.14 (ref 0.85–1.15)
KETONES UR STRIP-MCNC: NEGATIVE MG/DL
LACTATE BLD-SCNC: 1.3 MMOL/L
LEUKOCYTE ESTERASE UR QL STRIP: NEGATIVE
LIPASE SERPL-CCNC: 142 U/L (ref 13–60)
LYMPHOCYTES # BLD AUTO: 0.5 10E3/UL (ref 0.8–5.3)
LYMPHOCYTES NFR BLD AUTO: 2 %
MCH RBC QN AUTO: 29.6 PG (ref 26.5–33)
MCHC RBC AUTO-ENTMCNC: 32.1 G/DL (ref 31.5–36.5)
MCV RBC AUTO: 92 FL (ref 78–100)
MONOCYTES # BLD AUTO: 1.5 10E3/UL (ref 0–1.3)
MONOCYTES NFR BLD AUTO: 7 %
MUCOUS THREADS #/AREA URNS LPF: PRESENT /LPF
NEUTROPHILS # BLD AUTO: 18.2 10E3/UL (ref 1.6–8.3)
NEUTROPHILS NFR BLD AUTO: 90 %
NITRATE UR QL: NEGATIVE
NRBC # BLD AUTO: 0 10E3/UL
NRBC BLD AUTO-RTO: 0 /100
PCO2 BLDV: 39 MM HG (ref 40–50)
PH BLDV: 7.41 [PH] (ref 7.32–7.43)
PH UR STRIP: 5.5 [PH] (ref 5–7)
PLATELET # BLD AUTO: 179 10E3/UL (ref 150–450)
PO2 BLDV: 18 MM HG (ref 25–47)
POTASSIUM SERPL-SCNC: 5.4 MMOL/L (ref 3.4–5.3)
POTASSIUM SERPL-SCNC: 5.4 MMOL/L (ref 3.4–5.3)
POTASSIUM SERPL-SCNC: 5.7 MMOL/L (ref 3.4–5.3)
PROT SERPL-MCNC: 5.8 G/DL (ref 6.4–8.3)
PROT SERPL-MCNC: 6.2 G/DL (ref 6.4–8.3)
RBC # BLD AUTO: 4.56 10E6/UL (ref 4.4–5.9)
RBC URINE: <1 /HPF
SAO2 % BLDV: 26 % (ref 94–100)
SARS-COV-2 RNA RESP QL NAA+PROBE: NEGATIVE
SODIUM SERPL-SCNC: 134 MMOL/L (ref 136–145)
SODIUM SERPL-SCNC: 136 MMOL/L (ref 136–145)
SP GR UR STRIP: 1.01 (ref 1–1.03)
TROPONIN T SERPL HS-MCNC: 108 NG/L
TROPONIN T SERPL HS-MCNC: 141 NG/L
UROBILINOGEN UR STRIP-MCNC: NORMAL MG/DL
WBC # BLD AUTO: 20.5 10E3/UL (ref 4–11)
WBC URINE: 3 /HPF

## 2023-01-15 PROCEDURE — 84155 ASSAY OF PROTEIN SERUM: CPT | Mod: 91

## 2023-01-15 PROCEDURE — 86140 C-REACTIVE PROTEIN: CPT | Performed by: INTERNAL MEDICINE

## 2023-01-15 PROCEDURE — 258N000003 HC RX IP 258 OP 636

## 2023-01-15 PROCEDURE — 83690 ASSAY OF LIPASE: CPT | Performed by: INTERNAL MEDICINE

## 2023-01-15 PROCEDURE — 93005 ELECTROCARDIOGRAM TRACING: CPT | Performed by: INTERNAL MEDICINE

## 2023-01-15 PROCEDURE — 99285 EMERGENCY DEPT VISIT HI MDM: CPT | Mod: 25 | Performed by: INTERNAL MEDICINE

## 2023-01-15 PROCEDURE — 36415 COLL VENOUS BLD VENIPUNCTURE: CPT

## 2023-01-15 PROCEDURE — U0005 INFEC AGEN DETEC AMPLI PROBE: HCPCS | Performed by: INTERNAL MEDICINE

## 2023-01-15 PROCEDURE — 81001 URINALYSIS AUTO W/SCOPE: CPT | Performed by: INTERNAL MEDICINE

## 2023-01-15 PROCEDURE — 85610 PROTHROMBIN TIME: CPT | Performed by: INTERNAL MEDICINE

## 2023-01-15 PROCEDURE — 74176 CT ABD & PELVIS W/O CONTRAST: CPT | Mod: 26 | Performed by: RADIOLOGY

## 2023-01-15 PROCEDURE — 250N000013 HC RX MED GY IP 250 OP 250 PS 637: Performed by: STUDENT IN AN ORGANIZED HEALTH CARE EDUCATION/TRAINING PROGRAM

## 2023-01-15 PROCEDURE — 74176 CT ABD & PELVIS W/O CONTRAST: CPT

## 2023-01-15 PROCEDURE — 120N000002 HC R&B MED SURG/OB UMMC

## 2023-01-15 PROCEDURE — 250N000013 HC RX MED GY IP 250 OP 250 PS 637

## 2023-01-15 PROCEDURE — 36415 COLL VENOUS BLD VENIPUNCTURE: CPT | Performed by: INTERNAL MEDICINE

## 2023-01-15 PROCEDURE — 87506 IADNA-DNA/RNA PROBE TQ 6-11: CPT | Performed by: INTERNAL MEDICINE

## 2023-01-15 PROCEDURE — 84484 ASSAY OF TROPONIN QUANT: CPT | Performed by: INTERNAL MEDICINE

## 2023-01-15 PROCEDURE — 258N000003 HC RX IP 258 OP 636: Performed by: INTERNAL MEDICINE

## 2023-01-15 PROCEDURE — 99222 1ST HOSP IP/OBS MODERATE 55: CPT | Mod: GC | Performed by: INTERNAL MEDICINE

## 2023-01-15 PROCEDURE — 250N000013 HC RX MED GY IP 250 OP 250 PS 637: Performed by: INTERNAL MEDICINE

## 2023-01-15 PROCEDURE — 96374 THER/PROPH/DIAG INJ IV PUSH: CPT | Performed by: INTERNAL MEDICINE

## 2023-01-15 PROCEDURE — 96361 HYDRATE IV INFUSION ADD-ON: CPT | Performed by: INTERNAL MEDICINE

## 2023-01-15 PROCEDURE — 82803 BLOOD GASES ANY COMBINATION: CPT

## 2023-01-15 PROCEDURE — 250N000011 HC RX IP 250 OP 636: Performed by: INTERNAL MEDICINE

## 2023-01-15 PROCEDURE — G0378 HOSPITAL OBSERVATION PER HR: HCPCS

## 2023-01-15 PROCEDURE — 87493 C DIFF AMPLIFIED PROBE: CPT | Performed by: INTERNAL MEDICINE

## 2023-01-15 PROCEDURE — 80053 COMPREHEN METABOLIC PANEL: CPT | Performed by: INTERNAL MEDICINE

## 2023-01-15 PROCEDURE — 85652 RBC SED RATE AUTOMATED: CPT | Performed by: INTERNAL MEDICINE

## 2023-01-15 PROCEDURE — 87324 CLOSTRIDIUM AG IA: CPT | Mod: XU | Performed by: INTERNAL MEDICINE

## 2023-01-15 PROCEDURE — 84132 ASSAY OF SERUM POTASSIUM: CPT

## 2023-01-15 PROCEDURE — 93010 ELECTROCARDIOGRAM REPORT: CPT | Performed by: INTERNAL MEDICINE

## 2023-01-15 PROCEDURE — 85025 COMPLETE CBC W/AUTO DIFF WBC: CPT | Performed by: INTERNAL MEDICINE

## 2023-01-15 RX ORDER — PROCHLORPERAZINE MALEATE 5 MG
5 TABLET ORAL EVERY 6 HOURS PRN
Status: DISCONTINUED | OUTPATIENT
Start: 2023-01-15 | End: 2023-01-16 | Stop reason: HOSPADM

## 2023-01-15 RX ORDER — SODIUM CHLORIDE 9 MG/ML
INJECTION, SOLUTION INTRAVENOUS CONTINUOUS
Status: DISCONTINUED | OUTPATIENT
Start: 2023-01-15 | End: 2023-01-15

## 2023-01-15 RX ORDER — LIDOCAINE 40 MG/G
CREAM TOPICAL
Status: DISCONTINUED | OUTPATIENT
Start: 2023-01-15 | End: 2023-01-16 | Stop reason: HOSPADM

## 2023-01-15 RX ORDER — DILTIAZEM HYDROCHLORIDE 120 MG/1
120 CAPSULE, EXTENDED RELEASE ORAL EVERY 24 HOURS
Status: DISCONTINUED | OUTPATIENT
Start: 2023-01-15 | End: 2023-01-15 | Stop reason: CLARIF

## 2023-01-15 RX ORDER — ZINC SULFATE 50(220)MG
220 CAPSULE ORAL DAILY
Status: DISCONTINUED | OUTPATIENT
Start: 2023-01-16 | End: 2023-01-16 | Stop reason: HOSPADM

## 2023-01-15 RX ORDER — DILTIAZEM HYDROCHLORIDE 120 MG/1
120 CAPSULE, COATED, EXTENDED RELEASE ORAL EVERY 24 HOURS
Status: DISCONTINUED | OUTPATIENT
Start: 2023-01-15 | End: 2023-01-16 | Stop reason: HOSPADM

## 2023-01-15 RX ORDER — VANCOMYCIN HYDROCHLORIDE 125 MG/1
125 CAPSULE ORAL 4 TIMES DAILY
Status: DISCONTINUED | OUTPATIENT
Start: 2023-01-15 | End: 2023-01-16 | Stop reason: HOSPADM

## 2023-01-15 RX ORDER — PROCHLORPERAZINE 25 MG
12.5 SUPPOSITORY, RECTAL RECTAL EVERY 12 HOURS PRN
Status: DISCONTINUED | OUTPATIENT
Start: 2023-01-15 | End: 2023-01-16 | Stop reason: HOSPADM

## 2023-01-15 RX ORDER — UREA 10 %
220 LOTION (ML) TOPICAL DAILY
Status: DISCONTINUED | OUTPATIENT
Start: 2023-01-16 | End: 2023-01-15

## 2023-01-15 RX ORDER — LACTULOSE 10 G/15ML
20 SOLUTION ORAL ONCE
Status: COMPLETED | OUTPATIENT
Start: 2023-01-15 | End: 2023-01-15

## 2023-01-15 RX ORDER — ONDANSETRON 2 MG/ML
4 INJECTION INTRAMUSCULAR; INTRAVENOUS ONCE
Status: COMPLETED | OUTPATIENT
Start: 2023-01-15 | End: 2023-01-15

## 2023-01-15 RX ORDER — SPIRONOLACTONE 50 MG/1
25 TABLET, FILM COATED ORAL DAILY
COMMUNITY
End: 2023-04-21

## 2023-01-15 RX ORDER — PANTOPRAZOLE SODIUM 20 MG/1
20 TABLET, DELAYED RELEASE ORAL DAILY
Status: DISCONTINUED | OUTPATIENT
Start: 2023-01-16 | End: 2023-01-16 | Stop reason: HOSPADM

## 2023-01-15 RX ORDER — SODIUM CHLORIDE, SODIUM LACTATE, POTASSIUM CHLORIDE, CALCIUM CHLORIDE 600; 310; 30; 20 MG/100ML; MG/100ML; MG/100ML; MG/100ML
INJECTION, SOLUTION INTRAVENOUS CONTINUOUS
Status: DISCONTINUED | OUTPATIENT
Start: 2023-01-15 | End: 2023-01-15

## 2023-01-15 RX ADMIN — SODIUM CHLORIDE: 9 INJECTION, SOLUTION INTRAVENOUS at 15:01

## 2023-01-15 RX ADMIN — VANCOMYCIN HYDROCHLORIDE 125 MG: 125 CAPSULE ORAL at 21:39

## 2023-01-15 RX ADMIN — DILTIAZEM HYDROCHLORIDE 120 MG: 120 CAPSULE, COATED, EXTENDED RELEASE ORAL at 21:40

## 2023-01-15 RX ADMIN — SODIUM CHLORIDE 1000 ML: 9 INJECTION, SOLUTION INTRAVENOUS at 11:35

## 2023-01-15 RX ADMIN — LACTULOSE 20 G: 20 SOLUTION ORAL at 15:01

## 2023-01-15 RX ADMIN — ONDANSETRON 4 MG: 2 INJECTION INTRAMUSCULAR; INTRAVENOUS at 11:35

## 2023-01-15 RX ADMIN — SODIUM CHLORIDE, POTASSIUM CHLORIDE, SODIUM LACTATE AND CALCIUM CHLORIDE: 600; 310; 30; 20 INJECTION, SOLUTION INTRAVENOUS at 17:57

## 2023-01-15 RX ADMIN — RIFAXIMIN 550 MG: 550 TABLET ORAL at 21:39

## 2023-01-15 ASSESSMENT — ACTIVITIES OF DAILY LIVING (ADL)
ADLS_ACUITY_SCORE: 35
WALKING_OR_CLIMBING_STAIRS_DIFFICULTY: NO
CHANGE_IN_FUNCTIONAL_STATUS_SINCE_ONSET_OF_CURRENT_ILLNESS/INJURY: NO
ADLS_ACUITY_SCORE: 35
ADLS_ACUITY_SCORE: 35
DIFFICULTY_EATING/SWALLOWING: NO
DIFFICULTY_COMMUNICATING: NO
DOING_ERRANDS_INDEPENDENTLY_DIFFICULTY: NO
DRESSING/BATHING_DIFFICULTY: NO
ADLS_ACUITY_SCORE: 35
ADLS_ACUITY_SCORE: 18
ADLS_ACUITY_SCORE: 35
HEARING_DIFFICULTY_OR_DEAF: NO
WEAR_GLASSES_OR_BLIND: NO
ADLS_ACUITY_SCORE: 35
FALL_HISTORY_WITHIN_LAST_SIX_MONTHS: NO
TOILETING_ISSUES: NO
CONCENTRATING,_REMEMBERING_OR_MAKING_DECISIONS_DIFFICULTY: NO

## 2023-01-15 NOTE — ED TRIAGE NOTES
Pt ambulatory to ED with frequent stools beginning last night and nausea and dry heaves, denies emesis. Hx if renal and liver issues, Pt states they are in the process of on getting on the transplant list through the [facility]. Pain 5/10 around hernia site w/ dry heaves. Pt reports they were hospitalized  at [facility] recently (end of December) for 5 esophageal varicies.      Triage Assessment     Row Name 01/15/23 1033       Triage Assessment (Adult)    Airway WDL WDL       Respiratory WDL    Respiratory WDL WDL       Skin Circulation/Temperature WDL    Skin Circulation/Temperature WDL WDL       Cardiac WDL    Cardiac WDL WDL       Peripheral/Neurovascular WDL    Peripheral Neurovascular WDL WDL       Cognitive/Neuro/Behavioral WDL    Cognitive/Neuro/Behavioral WDL WDL

## 2023-01-15 NOTE — H&P
"[facility]    History and Physical - Medicine Service, ORALIA TEAM 2       Date of Admission:  1/15/2023    Assessment & Plan      Damion Quinones is a 67 year old male who has a history of alcoholic cirrhosis c/b hepatic encephalopathy and esophageal varices s/p TIPS, paroxysmal atrial fibrillation, CKD, psoriatic arthritis, HTN, ANCA vasculitis, T2DM2 and recent C-diff infection. Admitted for recurrent C-diff infection    Non-Bloody Diarrhea  Recurrent C-Diff Infection  Intermittent Bleching  Multiple Episodes of non-bloody diarrhea in the last 24 hours. CT A/P with no acute concerns. No recent abx use. No recent travels or sick contact. Also with intermittent belching not associated with meals since variceal banding last 2 weeks at [facility]. C-Diff positive.  -Vancomycin pulsed regimen  -Enteric Stool Panel  -S/P 1L LR, On mIVF  -Will discuss with G.I about intermittent bleching      Elevated Creatinine  CKD 4 2/2 IgA vasculitis vs cirrhosis  Baseline around 5.3. Creatinine of 6.5 however does not meet criteria for PATI. 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 Elevated creatinine likely due to hypovolemia in setting of G.I loss. No record of nephrology following patient on chart review.  -IVF as stated below  -Trend Creatinine  -Outpatient Nephrology referral    Alcohol Cirrhosis   C/b by ascites, hepatic encephalopathy, esophageal varices s/p TIPS and variceal banding. Currently undergoing Liver transplant evaluation.   -Holding Lactulose given Diarrhea  -Holding Bumex/Spironolactone    MELD-Na score: 22 at 1/15/2023 " "11:07 AM  MELD score: 21 at 1/15/2023 11:07 AM  Calculated from:  Serum Creatinine: 6.59 mg/dL (Using max of 4 mg/dL) at 1/15/2023 11:07 AM  Serum Sodium: 136 mmol/L at 1/15/2023 11:07 AM  Total Bilirubin: 1.1 mg/dL at 1/15/2023 11:07 AM  INR(ratio): 1.14 at 1/15/2023 11:07 AM  Age: 67 years      Elevated Alkaline Phosphatase  Elevated ALT  ALT of 65 and alk phos of 197 on presentation. CT A/P with no acute findings.   -CTM    Hyperkalemia   Slightly elevated at 5.4 on presentation. Likely in setting of poor renal clearance. Patient also on PTA Potassium  -Repeat labs  -Holding PTA KCL      Demand Ischemia, Resolved  Troponin of 141 on presentation. EKG notable for Afib; patient with no symptoms concerning for ACS. Troponin trended to peak    AGMA  Elevated anion gap of 20 on presentation. Likely in setting of G.I Loss and underlying CKD.   -Repeat labs s/p IVF    Chronic Problems  Paroxysmal A-Fib: Not on AC; Cont. PTA Diltiazem  ANCA Vasculitis, Psoriatic Arthritis: Completed Steroid course. No active issues      Diet: Regular Diet Adult    DVT Prophylaxis: Ambulate every shift  Lux Catheter: Not present  Fluids: None  Lines: None     Cardiac Monitoring: None  Code Status: Full Code      Clinically Significant Risk Factors Present on Admission        # Hyperkalemia: Highest K = 5.4 mmol/L in last 2 days, will monitor as appropriate      # Anion Gap Metabolic Acidosis: Highest Anion Gap = 20 mmol/L in last 2 days, will monitor and treat as appropriate     # Acute Kidney Injury, unspecified: based on a >150% or 0.3 mg/dL increase in last creatinine compared to past 90 day average, will monitor renal function       # Obesity: Estimated body mass index is 33.67 kg/m  as calculated from the following:    Height as of this encounter: 1.702 m (5' 7\").    Weight as of this encounter: 97.5 kg (215 lb).           Disposition Plan      Expected Discharge Date: 01/17/2023                The patient's care was discussed with " Dr. Andrew Garcia MD  Medicine Service, MAROON TEAM 2  [facility]  Securely message with [employer] (more info)  Text page via [employer] Paging/Directory   See signed in provider for up to date coverage information  ______________________________________________________________________    Chief Complaint   Nausea, vomiting    History is obtained from the patient    History of Present Illness   Damion Quinones is a 67 year old male who has a history of alcoholic cirrhosis c/b hepatic encephalopathy and esophageal varices s/p TIPS, paroxysmal atrial fibrillation, CKD, psoriatic arthritis, HTN, ANCA vasculitis, T2DM2 and recent C-diff infection. Had a recent admission for variceal bleed about 2 weeks ago and states that since discharge, he has had continuous belching and gassy that has not resolved. In the last 24 hours, he has had multiple episodes of non-bloody diarrhea-about 6 times on 01/14 and about 7 times today prior to presentation. He is on lactulose and typically has about 3-4 bowel movements daily. Also endorses a bitter taste (non-metallic) in his mouth. Endorsing dry-heaving however no emesis.     Was admitted from 12/16-12/21 for similar episode if frequent diarrhea and was found to have C-diff colitis for which he completed a course or oral vancomycin. He was on abx prior to C-diff infection however no recent abx use. No recent travels, no sick contacts, no increase use of lactulose.    ED COURSE  Vitals stable with BP slightly elevated at 149/87  -CT Imaging with no acute findings  -1L NS, Lactulose, Zofran given in ED     Past Medical History    Past Medical History:   Diagnosis Date     Alcoholic cirrhosis of liver with ascites (H) 10/11/2019     Arthritis     RA     Chronic kidney disease      Coronary artery disease     AFib 2016     Diabetes mellitus type 2, uncomplicated (H) 10/11/2019     History of blood transfusion     2016     History of  hemochromatosis 10/11/2019     Hypertension      Hypokalemia      Obesity      PAF (paroxysmal atrial fibrillation) (H)      Psoriatic arthritis (H)        Past Surgical History   Past Surgical History:   Procedure Laterality Date     APPENDECTOMY      Removed at 18 Years Old      COLONOSCOPY      2014 at [facility].      EYE SURGERY Bilateral     Cataract     GI SURGERY      TIPS     HERNIA REPAIR      History of bilateral inguinal hernia repair: 10/28/2014. Open hernia repair: 10/2017. Abdominal wound exploration and debridement 2017       Prior to Admission Medications   Prior to Admission Medications   Prescriptions Last Dose Informant Patient Reported? Taking?   Calcium Carbonate-Vitamin D 500-3.125 MG-MCG TABS  Other Yes No   Sig: Take 1 tablet by mouth 2 times daily   XIFAXAN 550 MG TABS tablet  Other Yes No   Sig: Take 550 mg by mouth 2 times daily   Zinc Sulfate 220 (50 Zn) MG TABS  Other Yes No   Sig: Take 220 mg by mouth   bumetanide (BUMEX) 1 MG tablet 1/15/2023 Other Yes Yes   Si mg 2 times daily   diltiazem ER (DILT-XR) 120 MG 24 hr capsule  Other Yes No   Sig: Take 120 mg by mouth   folic acid (FOLVITE) 1 MG tablet  Other Yes No   Sig: TAKE FIVE TABLETS BY MOUTH EVERY DAY   lactulose (CHRONULAC) 10 GM/15ML solution  Other Yes No   Sig: TAKE 45 ML BY MOUTH THREE TIMES DAILY   multivitamin w/minerals (THERA-VIT-M) tablet  Other Yes No   Sig: Take 1 tablet by mouth daily   pantoprazole (PROTONIX) 40 MG EC tablet  Other Yes No   Sig: Take 20 mg by mouth daily   potassium chloride (KLOR-CON) 20 MEQ packet  Other Yes No   Sig: Take 20 mEq by mouth 2 times daily   predniSONE (DELTASONE) 5 MG tablet  Other Yes No   Sig: Take 10 mg by mouth   spironolactone (ALDACTONE) 25 MG tablet  Other Yes No   Sig: Take 25 mg by mouth 2 times daily   vancomycin (VANCOCIN) 125 MG capsule  Other No No   Sig: Take 1 capsule (125 mg) by mouth 4 times daily      Facility-Administered Medications: None        Review  of Systems    The 10 point Review of Systems is negative other than noted in the HPI or here.      Physical Exam   Vital Signs: Temp: 98.2  F (36.8  C) Temp src: Oral BP: (!) 149/87 Pulse: 69   Resp: 10 SpO2: 100 % O2 Device: None (Room air)    Weight: 215 lbs 0 oz    General Appearance: NAD, Sitting in chair, Conversant  Respiratory: CTAB, Breathing comfortable on room air  Cardiovascular: RRR. No m/r/g  Abd: Soft, distended, no abdominal tenderness  Extremities: No LE peripheral edema     Medical Decision Making       Please see A&P for additional details of medical decision making.      Data     I have personally reviewed the following data over the past 24 hrs:    20.5 (H)  \   13.5   / 179     136 97 (L) 116.0 (H) /  104 (H)   5.4 (H) 19 (L) 6.59 (H) \       ALT: 65 (H) AST: 37 AP: 197 (H) TBILI: 1.1   ALB: 3.5 TOT PROTEIN: 6.2 (L) LIPASE: 142 (H)       Trop: 108 (HH) BNP: N/A       Procal: N/A CRP: 19.20 (H) Lactic Acid: 1.3       INR:  1.14 PTT:  N/A   D-dimer:  N/A Fibrinogen:  N/A

## 2023-01-15 NOTE — ED PROVIDER NOTES
[place] EMERGENCY DEPARTMENT ([facility])    1/15/23       ED PROVIDER NOTE      History     Chief Complaint   Patient presents with     Abdominal Pain     Around hernia     Nausea, Vomiting, & Diarrhea     The history is provided by the patient and medical records.      Damion Quinones is a 67 year old male with PMH of alcoholic cirrhosis c/b hepatic encephalopathy, esophageal varices, TIPS procedure as well as paroxysmal atrial fibrillation, CKD, psoriatic arthritis, hypertension and ANCA vasculitis, DM2, recent C-diff infection who presents to the ED with right abdominal pain around his hernia, nausea, vomiting, and diarrhea.  The patient reports he has been gassy, having indigestion and belching, since being discharged from [facility] on 1st January.  Symptoms became worse yesterday with nausea and dry heaving.  He also reports 7 BMs since this morning.  He denies fever.  He reports he recently had C. difficile and finished treatment 12/27.    He currently follows with Dr. Beaulieu through  and is being worked up for potential liver and renal transplant with Dr. Poole at the [facility].     Past Medical History  Past Medical History:   Diagnosis Date     Alcoholic cirrhosis of liver with ascites (H) 10/11/2019     Arthritis     RA     Chronic kidney disease      Coronary artery disease     AFib 2016     Diabetes mellitus type 2, uncomplicated (H) 10/11/2019     History of blood transfusion     2016     History of hemochromatosis 10/11/2019     Hypertension      Hypokalemia      Obesity      PAF (paroxysmal atrial fibrillation) (H)      Psoriatic arthritis (H)      Past Surgical History:   Procedure Laterality Date     APPENDECTOMY      Removed at 18 Years Old      COLONOSCOPY      2014 at [facility].      EYE SURGERY Bilateral     Cataract     GI SURGERY      TIPS     HERNIA REPAIR      History of bilateral inguinal hernia repair: 10/28/2014. Open hernia repair: 10/2017. Abdominal wound  "exploration and debridement 12/27/2017     bumetanide (BUMEX) 1 MG tablet  Calcium Carbonate-Vitamin D 500-3.125 MG-MCG TABS  diltiazem ER (DILT-XR) 120 MG 24 hr capsule  folic acid (FOLVITE) 1 MG tablet  lactulose (CHRONULAC) 10 GM/15ML solution  multivitamin w/minerals (THERA-VIT-M) tablet  pantoprazole (PROTONIX) 40 MG EC tablet  potassium chloride (KLOR-CON) 20 MEQ packet  predniSONE (DELTASONE) 5 MG tablet  spironolactone (ALDACTONE) 25 MG tablet  vancomycin (VANCOCIN) 125 MG capsule  XIFAXAN 550 MG TABS tablet  Zinc Sulfate 220 (50 Zn) MG TABS      No Known Allergies  Family History  Family History   Problem Relation Age of Onset     Lung Cancer Mother      Colon Cancer Father      Lung Cancer Brother      Heart Failure Brother      Kidney Disease Brother      Social History   Social History     Tobacco Use     Smoking status: Never     Smokeless tobacco: Never   Substance Use Topics     Alcohol use: Not Currently     Comment: Last ETOH use was 12/31/2021     Drug use: Not Currently      Past medical history, past surgical history, medications, allergies, family history, and social history were reviewed with the patient. No additional pertinent items.      A medically appropriate review of systems was performed with pertinent positives and negatives noted in the HPI, and all other systems negative.    Physical Exam   BP: (!) 137/90  Pulse: 86  Temp: 98.2  F (36.8  C)  Resp: 18  Height: 170.2 cm (5' 7\")  Weight: 97.5 kg (215 lb)  SpO2: 100 %  Physical Exam  Constitutional:       General: He is not in acute distress.     Appearance: He is not diaphoretic.   HENT:      Head: Atraumatic.   Eyes:      General: No scleral icterus.     Pupils: Pupils are equal, round, and reactive to light.   Cardiovascular:      Rate and Rhythm: Normal rate and regular rhythm.      Heart sounds: Normal heart sounds. No murmur heard.    No friction rub. No gallop.   Pulmonary:      Effort: Pulmonary effort is normal. No respiratory " distress.      Breath sounds: Normal breath sounds. No stridor. No wheezing, rhonchi or rales.   Chest:      Chest wall: No tenderness.   Abdominal:      General: Bowel sounds are normal. There is distension.      Palpations: Abdomen is soft.      Tenderness: There is generalized abdominal tenderness. There is no right CVA tenderness, left CVA tenderness, guarding or rebound. Negative signs include Shine's sign, Rovsing's sign, McBurney's sign, psoas sign and obturator sign.      Hernia: No hernia is present.       Musculoskeletal:         General: No tenderness.      Cervical back: Neck supple.   Skin:     General: Skin is warm.      Findings: No rash.   Neurological:      General: No focal deficit present.      Cranial Nerves: No cranial nerve deficit.           ED Course, Procedures, & Data     11:16 AM  The patient was seen and examined by Rodrigo Cameron MD in Room ED09.     Procedures       ED Course Selections:        EKG Interpretation:      Interpreted by Rodrigo Cameron MD, MD  Time reviewed: on arrival  Symptoms at time of EKG: abd pain   Rhythm: atrial fib rate controled  Rate: normal  Axis: normal  Ectopy: none  Conduction: normal  ST Segments/ T Waves: No ST-T wave changes  Q Waves: none  Comparison to prior: Unchanged from 12/16/2023    Clinical Impression: normal EKG                     Results for orders placed or performed during the hospital encounter of 01/15/23   CT Abdomen Pelvis w/o Contrast     Status: None    Narrative    EXAMINATION: CT ABDOMEN PELVIS W/O CONTRAST, 1/15/2023 12:53 PM    TECHNIQUE:  Helical CT images from the thoracic inlet through the  symphysis pubis were obtained  without contrast.     COMPARISON: 7/20/2022 chest CT    HISTORY: distention pain    FINDINGS:    Chest: Lung bases are clear. Heart size is normal.    Abdomen and pelvis: Cirrhotic liver morphology with TIPS stent noted.  Metallic densities near the splenic vein are most suggestive of  embolization coils.  Metallic density in the gastric lumen appears to  be a endoscopically placed metallic clip. Gallbladder is nondistended.  No biliary dilatation. Pancreas, spleen adrenal glands bowel normal.  There is multifocal renal cortical atrophy with mild perinephric  stranding. Decreased left renal subcapsular hematoma measuring 1.4 cm  in thickness The small and large bowel are normal in diameter. No  large bowel wall thickening. The appendix is not definitively  visualized. No free fluid in the abdomen or pelvis.    Bones and soft tissues: Small fat-containing umbilical hernia with a  single loop of normal-appearing small bowel. Extensive, multilevel  degenerative changes in the lower thoracic and lumbar spine.      Impression    IMPRESSION:   1. No acute findings in the lower chest, abdomen or pelvis.   2. Cirrhotic liver morphology with TIPS stent in place. The patency of  the stent cannot be ascertained on a noncontrast exam.  3. Multifocal renal cortical atrophy is consistent with patient's  history of medical renal disease. The evaluation of any abnormal renal  masses is limited by the lack of intravenous contrast. Left renal  subcapsular hematoma has decreased compared to 12/7/22 CT.    I have personally reviewed the examination and initial interpretation  and I agree with the findings.    ADRIANA BARROS MD         SYSTEM ID:  [id number]   [place] Draw     Status: None    Narrative    The following orders were created for panel order [place] Draw.  Procedure                               Abnormality         Status                     ---------                               -----------         ------                     Extra Blue Top Tube[[id number]]                              Final result               Extra Red Top Tube[[id number]]                               Final result               Extra Green Top (Lithium...[[id number]]                      Final result               Extra Purple Top Tube[[id number]]                             Final result                 Please view results for these tests on the individual orders.   Extra Blue Top Tube     Status: None   Result Value Ref Range    Hold Specimen JIC    Extra Red Top Tube     Status: None   Result Value Ref Range    Hold Specimen JIC    Extra Green Top (Lithium Heparin) Tube     Status: None   Result Value Ref Range    Hold Specimen JIC    Extra Purple Top Tube     Status: None   Result Value Ref Range    Hold Specimen JIC    INR     Status: Normal   Result Value Ref Range    INR 1.14 0.85 - 1.15   Comprehensive metabolic panel     Status: Abnormal   Result Value Ref Range    Sodium 136 136 - 145 mmol/L    Potassium 5.4 (H) 3.4 - 5.3 mmol/L    Chloride 97 (L) 98 - 107 mmol/L    Carbon Dioxide (CO2) 19 (L) 22 - 29 mmol/L    Anion Gap 20 (H) 7 - 15 mmol/L    Urea Nitrogen 116.0 (H) 8.0 - 23.0 mg/dL    Creatinine 6.59 (H) 0.67 - 1.17 mg/dL    Calcium 9.6 8.8 - 10.2 mg/dL    Glucose 104 (H) 70 - 99 mg/dL    Alkaline Phosphatase 197 (H) 40 - 129 U/L    AST 37 10 - 50 U/L    ALT 65 (H) 10 - 50 U/L    Protein Total 6.2 (L) 6.4 - 8.3 g/dL    Albumin 3.5 3.5 - 5.2 g/dL    Bilirubin Total 1.1 <=1.2 mg/dL    GFR Estimate 9 (L) >60 mL/min/1.73m2   Lipase     Status: Abnormal   Result Value Ref Range    Lipase 142 (H) 13 - 60 U/L   Troponin T, High Sensitivity     Status: Abnormal   Result Value Ref Range    Troponin T, High Sensitivity 141 (HH) <=22 ng/L   CRP inflammation     Status: Abnormal   Result Value Ref Range    CRP Inflammation 19.20 (H) <5.00 mg/L   Erythrocyte sedimentation rate auto     Status: Normal   Result Value Ref Range    Erythrocyte Sedimentation Rate 11 0 - 20 mm/hr   UA with Microscopic reflex to Culture     Status: Abnormal    Specimen: Urine, Clean Catch   Result Value Ref Range    Color Urine Light Yellow Colorless, Straw, Light Yellow, Yellow    Appearance Urine Clear Clear    Glucose Urine Negative Negative mg/dL    Bilirubin Urine Negative Negative    Ketones  Urine Negative Negative mg/dL    Specific Gravity Urine 1.011 1.003 - 1.035    Blood Urine Negative Negative    pH Urine 5.5 5.0 - 7.0    Protein Albumin Urine Negative Negative mg/dL    Urobilinogen Urine Normal Normal, 2.0 mg/dL    Nitrite Urine Negative Negative    Leukocyte Esterase Urine Negative Negative    Mucus Urine Present (A) None Seen /LPF    RBC Urine <1 <=2 /HPF    WBC Urine 3 <=5 /HPF    Narrative    Urine Culture not indicated   CBC with platelets and differential     Status: Abnormal   Result Value Ref Range    WBC Count 20.5 (H) 4.0 - 11.0 10e3/uL    RBC Count 4.56 4.40 - 5.90 10e6/uL    Hemoglobin 13.5 13.3 - 17.7 g/dL    Hematocrit 42.1 40.0 - 53.0 %    MCV 92 78 - 100 fL    MCH 29.6 26.5 - 33.0 pg    MCHC 32.1 31.5 - 36.5 g/dL    RDW 14.7 10.0 - 15.0 %    Platelet Count 179 150 - 450 10e3/uL    % Neutrophils 90 %    % Lymphocytes 2 %    % Monocytes 7 %    % Eosinophils 0 %    % Basophils 0 %    % Immature Granulocytes 1 %    NRBCs per 100 WBC 0 <1 /100    Absolute Neutrophils 18.2 (H) 1.6 - 8.3 10e3/uL    Absolute Lymphocytes 0.5 (L) 0.8 - 5.3 10e3/uL    Absolute Monocytes 1.5 (H) 0.0 - 1.3 10e3/uL    Absolute Eosinophils 0.0 0.0 - 0.7 10e3/uL    Absolute Basophils 0.0 0.0 - 0.2 10e3/uL    Absolute Immature Granulocytes 0.3 <=0.4 10e3/uL    Absolute NRBCs 0.0 10e3/uL   iStat Gases (lactate) venous, POCT     Status: Abnormal   Result Value Ref Range    Lactic Acid POCT 1.3 <=2.0 mmol/L    Bicarbonate Venous POCT 24 21 - 28 mmol/L    O2 Sat, Venous POCT 26 (L) 94 - 100 %    pCO2V Venous POCT 39 (L) 40 - 50 mm Hg    pH Venous POCT 7.41 7.32 - 7.43    pO2 Venous POCT 18 (L) 25 - 47 mm Hg   Troponin T, High Sensitivity     Status: Abnormal   Result Value Ref Range    Troponin T, High Sensitivity 108 (HH) <=22 ng/L   EKG 12-lead, tracing only     Status: None (Preliminary result)   Result Value Ref Range    Systolic Blood Pressure  mmHg    Diastolic Blood Pressure  mmHg    Ventricular Rate 73 BPM     Atrial Rate 375 BPM    GA Interval  ms    QRS Duration 76 ms     ms    QTc 418 ms    P Axis  degrees    R AXIS 51 degrees    T Axis 40 degrees    Interpretation ECG Atrial fibrillation  Abnormal ECG      CBC with platelets differential     Status: Abnormal    Narrative    The following orders were created for panel order CBC with platelets differential.  Procedure                               Abnormality         Status                     ---------                               -----------         ------                     CBC with platelets and d...[[id number]]  Abnormal            Final result                 Please view results for these tests on the individual orders.     Medications   0.9% sodium chloride BOLUS (0 mLs Intravenous Stopped 1/15/23 1455)     Followed by   sodium chloride 0.9% infusion ( Intravenous Not Given 1/15/23 1455)   sodium chloride 0.9% infusion ( Intravenous New Bag 1/15/23 1501)   ondansetron (ZOFRAN) injection 4 mg (4 mg Intravenous Given 1/15/23 1135)   lactulose (CHRONULAC) solution 20 g (20 g Oral Given 1/15/23 1501)     Labs Ordered and Resulted from Time of ED Arrival to Time of ED Departure   COMPREHENSIVE METABOLIC PANEL - Abnormal       Result Value    Sodium 136      Potassium 5.4 (*)     Chloride 97 (*)     Carbon Dioxide (CO2) 19 (*)     Anion Gap 20 (*)     Urea Nitrogen 116.0 (*)     Creatinine 6.59 (*)     Calcium 9.6      Glucose 104 (*)     Alkaline Phosphatase 197 (*)     AST 37      ALT 65 (*)     Protein Total 6.2 (*)     Albumin 3.5      Bilirubin Total 1.1      GFR Estimate 9 (*)    LIPASE - Abnormal    Lipase 142 (*)    TROPONIN T, HIGH SENSITIVITY - Abnormal    Troponin T, High Sensitivity 141 (*)    CRP INFLAMMATION - Abnormal    CRP Inflammation 19.20 (*)    ROUTINE UA WITH MICROSCOPIC REFLEX TO CULTURE - Abnormal    Color Urine Light Yellow      Appearance Urine Clear      Glucose Urine Negative      Bilirubin Urine Negative      Ketones Urine Negative       Specific Gravity Urine 1.011      Blood Urine Negative      pH Urine 5.5      Protein Albumin Urine Negative      Urobilinogen Urine Normal      Nitrite Urine Negative      Leukocyte Esterase Urine Negative      Mucus Urine Present (*)     RBC Urine <1      WBC Urine 3     CBC WITH PLATELETS AND DIFFERENTIAL - Abnormal    WBC Count 20.5 (*)     RBC Count 4.56      Hemoglobin 13.5      Hematocrit 42.1      MCV 92      MCH 29.6      MCHC 32.1      RDW 14.7      Platelet Count 179      % Neutrophils 90      % Lymphocytes 2      % Monocytes 7      % Eosinophils 0      % Basophils 0      % Immature Granulocytes 1      NRBCs per 100 WBC 0      Absolute Neutrophils 18.2 (*)     Absolute Lymphocytes 0.5 (*)     Absolute Monocytes 1.5 (*)     Absolute Eosinophils 0.0      Absolute Basophils 0.0      Absolute Immature Granulocytes 0.3      Absolute NRBCs 0.0     ISTAT GASES LACTATE VENOUS POCT - Abnormal    Lactic Acid POCT 1.3      Bicarbonate Venous POCT 24      O2 Sat, Venous POCT 26 (*)     pCO2V Venous POCT 39 (*)     pH Venous POCT 7.41      pO2 Venous POCT 18 (*)    TROPONIN T, HIGH SENSITIVITY - Abnormal    Troponin T, High Sensitivity 108 (*)    INR - Normal    INR 1.14     ERYTHROCYTE SEDIMENTATION RATE AUTO - Normal    Erythrocyte Sedimentation Rate 11     COVID-19 VIRUS (CORONAVIRUS) BY PCR   ENTERIC BACTERIA AND VIRUS PANEL BY AVERY STOOL   C. DIFFICILE TOXIN B PCR WITH REFLEX TO C. DIFFICILE ANTIGEN AND TOXINS A/B EIA     CT Abdomen Pelvis w/o Contrast   Final Result   IMPRESSION:    1. No acute findings in the lower chest, abdomen or pelvis.    2. Cirrhotic liver morphology with TIPS stent in place. The patency of   the stent cannot be ascertained on a noncontrast exam.   3. Multifocal renal cortical atrophy is consistent with patient's   history of medical renal disease. The evaluation of any abnormal renal   masses is limited by the lack of intravenous contrast. Left renal   subcapsular hematoma has  decreased compared to 12/7/22 CT.      I have personally reviewed the examination and initial interpretation   and I agree with the findings.      ADRIANA BARROS MD            SYSTEM ID:  [id number]             Medical Decision Making  The patient presented with a problem that is a chronic illness severe exacerbation, progression, or side effect of treatment.    The patient's evaluation involved:  review of 3+ prior external note(s) (see separate area of note for details)  ordering and review of 3+ test(s) (see separate area of note for details)    The patient's management involved a decision regarding hospitalization.      Assessment & Plan     Diffuse abd pain with diarrhea in the pt with ESLD with CRF possible Hepatorenal , recent variceal bleed clipped, labs sig for PATI with mild hyperkalemia-IVF and lactulose, stool for C diff-jsut completed po abx late 12/2022, labs otherwise ok, CT without acute pathologies, D/W Medicine-admit on tele for high K. abd pain etiology not clear but WBC 20 k up trending.         I have reviewed the nursing notes. I have reviewed the findings, diagnosis, plan and need for follow up with the patient.    New Prescriptions    No medications on file       Final diagnoses:   Acute kidney injury (H)   Hyperkalemia   Abdominal pain, generalized   End-stage liver disease (H)   Leukocytosis, unspecified type     I, Virginia Wheeler, am serving as a trained medical scribe to document services personally performed by Rodrigo Cameron MD based on the provider's statements to me on January 15, 2023.  This document has been checked and approved by the attending provider.    I, Rodrigo Cameron MD, was physically present and have reviewed and verified the accuracy of this note documented by Virginia Wheeler, medical scribe.        Rodrigo Cameron Md  [facility] EMERGENCY DEPARTMENT  1/15/2023     Rodrigo Cameron MD  01/15/23 4202

## 2023-01-16 VITALS
BODY MASS INDEX: 33.74 KG/M2 | RESPIRATION RATE: 16 BRPM | HEIGHT: 67 IN | HEART RATE: 82 BPM | SYSTOLIC BLOOD PRESSURE: 147 MMHG | OXYGEN SATURATION: 100 % | DIASTOLIC BLOOD PRESSURE: 87 MMHG | TEMPERATURE: 98.5 F | WEIGHT: 215 LBS

## 2023-01-16 LAB
ALBUMIN SERPL BCG-MCNC: 2.9 G/DL (ref 3.5–5.2)
ALP SERPL-CCNC: 164 U/L (ref 40–129)
ALT SERPL W P-5'-P-CCNC: 48 U/L (ref 10–50)
ANION GAP SERPL CALCULATED.3IONS-SCNC: 18 MMOL/L (ref 7–15)
AST SERPL W P-5'-P-CCNC: 32 U/L (ref 10–50)
ATRIAL RATE - MUSE: 375 BPM
BASOPHILS # BLD AUTO: 0 10E3/UL (ref 0–0.2)
BASOPHILS NFR BLD AUTO: 0 %
BILIRUB SERPL-MCNC: 0.9 MG/DL
BUN SERPL-MCNC: 111 MG/DL (ref 8–23)
C COLI+JEJUNI+LARI FUSA STL QL NAA+PROBE: NOT DETECTED
CALCIUM SERPL-MCNC: 8.8 MG/DL (ref 8.8–10.2)
CHLORIDE SERPL-SCNC: 98 MMOL/L (ref 98–107)
CREAT SERPL-MCNC: 6.38 MG/DL (ref 0.67–1.17)
DEPRECATED HCO3 PLAS-SCNC: 17 MMOL/L (ref 22–29)
DIASTOLIC BLOOD PRESSURE - MUSE: NORMAL MMHG
EC STX1 GENE STL QL NAA+PROBE: NOT DETECTED
EC STX2 GENE STL QL NAA+PROBE: NOT DETECTED
EOSINOPHIL # BLD AUTO: 0.1 10E3/UL (ref 0–0.7)
EOSINOPHIL NFR BLD AUTO: 1 %
ERYTHROCYTE [DISTWIDTH] IN BLOOD BY AUTOMATED COUNT: 14.6 % (ref 10–15)
GFR SERPL CREATININE-BSD FRML MDRD: 9 ML/MIN/1.73M2
GLUCOSE SERPL-MCNC: 81 MG/DL (ref 70–99)
HCT VFR BLD AUTO: 36.6 % (ref 40–53)
HGB BLD-MCNC: 11.8 G/DL (ref 13.3–17.7)
IMM GRANULOCYTES # BLD: 0.2 10E3/UL
IMM GRANULOCYTES NFR BLD: 1 %
INTERPRETATION ECG - MUSE: NORMAL
LYMPHOCYTES # BLD AUTO: 0.9 10E3/UL (ref 0.8–5.3)
LYMPHOCYTES NFR BLD AUTO: 6 %
MCH RBC QN AUTO: 29.5 PG (ref 26.5–33)
MCHC RBC AUTO-ENTMCNC: 32.2 G/DL (ref 31.5–36.5)
MCV RBC AUTO: 92 FL (ref 78–100)
MONOCYTES # BLD AUTO: 1.5 10E3/UL (ref 0–1.3)
MONOCYTES NFR BLD AUTO: 9 %
NEUTROPHILS # BLD AUTO: 13.5 10E3/UL (ref 1.6–8.3)
NEUTROPHILS NFR BLD AUTO: 83 %
NOROV GI+II ORF1-ORF2 JNC STL QL NAA+PR: NOT DETECTED
NRBC # BLD AUTO: 0 10E3/UL
NRBC BLD AUTO-RTO: 0 /100
P AXIS - MUSE: NORMAL DEGREES
PLATELET # BLD AUTO: 136 10E3/UL (ref 150–450)
POTASSIUM SERPL-SCNC: 5.2 MMOL/L (ref 3.4–5.3)
PR INTERVAL - MUSE: NORMAL MS
PROT SERPL-MCNC: 5.4 G/DL (ref 6.4–8.3)
QRS DURATION - MUSE: 76 MS
QT - MUSE: 380 MS
QTC - MUSE: 418 MS
R AXIS - MUSE: 51 DEGREES
RBC # BLD AUTO: 4 10E6/UL (ref 4.4–5.9)
RVA NSP5 STL QL NAA+PROBE: NOT DETECTED
SALMONELLA SP RPOD STL QL NAA+PROBE: NOT DETECTED
SHIGELLA SP+EIEC IPAH STL QL NAA+PROBE: NOT DETECTED
SODIUM SERPL-SCNC: 133 MMOL/L (ref 136–145)
STRESS ECHO TARGET HR: 155
SYSTOLIC BLOOD PRESSURE - MUSE: NORMAL MMHG
T AXIS - MUSE: 40 DEGREES
V CHOL+PARA RFBL+TRKH+TNAA STL QL NAA+PR: NOT DETECTED
VENTRICULAR RATE- MUSE: 73 BPM
WBC # BLD AUTO: 16.2 10E3/UL (ref 4–11)
Y ENTERO RECN STL QL NAA+PROBE: NOT DETECTED

## 2023-01-16 PROCEDURE — 99254 IP/OBS CNSLTJ NEW/EST MOD 60: CPT | Mod: GC | Performed by: STUDENT IN AN ORGANIZED HEALTH CARE EDUCATION/TRAINING PROGRAM

## 2023-01-16 PROCEDURE — G0378 HOSPITAL OBSERVATION PER HR: HCPCS

## 2023-01-16 PROCEDURE — 80053 COMPREHEN METABOLIC PANEL: CPT

## 2023-01-16 PROCEDURE — 85025 COMPLETE CBC W/AUTO DIFF WBC: CPT

## 2023-01-16 PROCEDURE — 99239 HOSP IP/OBS DSCHRG MGMT >30: CPT | Mod: GC | Performed by: STUDENT IN AN ORGANIZED HEALTH CARE EDUCATION/TRAINING PROGRAM

## 2023-01-16 PROCEDURE — 250N000013 HC RX MED GY IP 250 OP 250 PS 637

## 2023-01-16 PROCEDURE — 36415 COLL VENOUS BLD VENIPUNCTURE: CPT

## 2023-01-16 PROCEDURE — 258N000003 HC RX IP 258 OP 636

## 2023-01-16 RX ORDER — SODIUM CHLORIDE, SODIUM LACTATE, POTASSIUM CHLORIDE, CALCIUM CHLORIDE 600; 310; 30; 20 MG/100ML; MG/100ML; MG/100ML; MG/100ML
INJECTION, SOLUTION INTRAVENOUS CONTINUOUS
Status: DISCONTINUED | OUTPATIENT
Start: 2023-01-16 | End: 2023-01-16 | Stop reason: HOSPADM

## 2023-01-16 RX ADMIN — SODIUM CHLORIDE, POTASSIUM CHLORIDE, SODIUM LACTATE AND CALCIUM CHLORIDE: 600; 310; 30; 20 INJECTION, SOLUTION INTRAVENOUS at 08:08

## 2023-01-16 RX ADMIN — ZINC SULFATE 220 MG (50 MG) CAPSULE 220 MG: CAPSULE at 08:06

## 2023-01-16 RX ADMIN — RIFAXIMIN 550 MG: 550 TABLET ORAL at 08:06

## 2023-01-16 RX ADMIN — VANCOMYCIN HYDROCHLORIDE 125 MG: 125 CAPSULE ORAL at 16:35

## 2023-01-16 RX ADMIN — PANTOPRAZOLE SODIUM 20 MG: 20 TABLET, DELAYED RELEASE ORAL at 08:06

## 2023-01-16 RX ADMIN — VANCOMYCIN HYDROCHLORIDE 125 MG: 125 CAPSULE ORAL at 08:06

## 2023-01-16 RX ADMIN — VANCOMYCIN HYDROCHLORIDE 125 MG: 125 CAPSULE ORAL at 12:16

## 2023-01-16 ASSESSMENT — ACTIVITIES OF DAILY LIVING (ADL)
DEPENDENT_IADLS:: INDEPENDENT
ADLS_ACUITY_SCORE: 18
ADLS_ACUITY_SCORE: 19
ADLS_ACUITY_SCORE: 18

## 2023-01-16 NOTE — PLAN OF CARE
Goal Outcome Evaluation:      Plan of Care Reviewed With: patient    Overall Patient Progress: improving    Outcome Evaluation: Pt A&O. VSS on RA. Denies pain. Wife at bedside. Discharge instructions explained, pt and wife verbalized understanding. L PIV removed. Wife picked up medications at discharge pharmacy. Pt discharged home w/ belongings.

## 2023-01-16 NOTE — CONSULTS
Care Management Initial Consult    General Information  Assessment completed with: Patient, Spouse or significant other,    Type of CM/SW Visit: Initial Assessment    Primary Care Provider verified and updated as needed: Yes (Dr. Polo Foss in [place]) . Pt also sees outpt nephrologist for liver and kidney transplant work-up.   Readmission within the last 30 days: other (see comments)   Return Category: Exacerbation of disease  Reason for Consult: discharge planning  Advance Care Planning: Advance Care Planning Reviewed: no concerns identified          Communication Assessment  Patient's communication style: spoken language (English or Bilingual)    Hearing Difficulty or Deaf: no   Wear Glasses or Blind: no    Cognitive  Cognitive/Neuro/Behavioral: WDL                      Living Environment:   People in home: spouse  Apoorva (spouse)  Current living Arrangements: house      Able to return to prior arrangements: yes       Family/Social Support:  Care provided by: self  Provides care for: no one  Marital Status:   Wife  Apoorva       Description of Support System: Supportive, Involved    Support Assessment: Adequate family and caregiver support    Current Resources:   Patient receiving home care services: No     Community Resources: None  Equipment currently used at home: none  Supplies currently used at home: None    Employment/Financial:  Employment Status: retired        Financial Concerns: No concerns identified           Lifestyle & Psychosocial Needs:  Social Determinants of Health     Tobacco Use: Low Risk      Smoking Tobacco Use: Never     Smokeless Tobacco Use: Never     Passive Exposure: Not on file   Alcohol Use: Not on file   Financial Resource Strain: Not on file   Food Insecurity: Not on file   Transportation Needs: Not on file   Physical Activity: Not on file   Stress: Not on file   Social Connections: Not on file   Intimate Partner Violence: Not on file   Depression: Not at risk     PHQ-2  Score: 0   Housing Stability: Not on file       Functional Status:  Prior to admission patient needed assistance:   Dependent ADLs:: Independent  Dependent IADLs:: Independent       Mental Health Status:  Mental Health Status: No Current Concerns       Chemical Dependency Status:  Chemical Dependency Status: No Current Concerns             Values/Beliefs:  Spiritual, Cultural Beliefs, Hindu Practices, Values that affect care: no               Additional Information:    Damion Quinones is a 67 year old male who has a history of alcoholic cirrhosis c/b hepatic encephalopathy and esophageal varices s/p TIPS, paroxysmal atrial fibrillation, CKD, psoriatic arthritis, HTN, ANCA vasculitis, T2DM2 and recent C-diff infection. Admitted for recurrent C-diff infection.     RNCC met pt at bedside. Introduced self and role to pt and pt's wife Apoorva. Pt reported no concerns or needs for services or supports upon discharge. Pt does have ID consult, currently on PO abx, but if changed to IV will make appropriate referrals and pt and wife were educated on this process and these services in case of need.     Pretty Oleary RNCC  Orienting with Adeline Cotton 5A  Pager [phone]    SEARCHABLE in [facility] - search CARE COORDINATOR         [place] [facility] [place] ([phone]) Saturday & Sunday; [phone]) [facility] Recognized Holidays         Units: 4A, 4C, 4E, 5A & 5B   Pager: [phone]         Units: 6A & 6B    Pager: [phone]         Units: 6C & 6D   Pager: [phone]         Units: 7A, 7B, 7C, 7D & 5C    Pager: [phone]         [place] [facility] [place] 5 [place]o, 5 Med/Surg, 6 Med/Surg, 8A, 10 ICU, & [facility]    Pager: [phone]

## 2023-01-16 NOTE — CONSULTS
General Infectious Disease Service Consultation - [place] Team     Patient:  Damion Quinones   Date of birth 1955, Medical record number [id number]  Date of Visit:  01/16/2023  Date of Admission: 1/15/2023  Consult Requester:Joo Boogie MD            Assessment and Recommendations:   ASSESSMENT:  67-year-old male with significant history of decompensated alcoholic cirrhosis (ESLD) c/b hepatic encephalopathy and esophageal varices s/p TIPS, paroxysmal atrial fibrillation, CKD IV not on HD, psoriatic arthritis, HTN, ANCA vasculitis, on long term prednisone T2DM2 and recent C-diff infection diagnosed on 12/16 finished course of oral vanco on 12/27 with resolution.  Patient presents with progressing watery diarrhea until yesterday, nausea and episodes of dry heaves.  First recurrence C. difficile colitis found with toxin b test and GDH. Infectious disease service was consulted to assess and comment on proper mgmt of recurrent CDI.    Discussion:  # C/f residual C-diff infection  # Possible new onset of C-diff with new exposure to antibiotic history  #  First recurrence C. difficile colitis found with toxin b test and GDH.   Recently diagnosed and treated for C.diff from 12/16-12/21 and completed a course of 10 days oral vancomycin finished on 12/27 with resolution. Was seen at [facility] on 12/28 for internal GI bleed from nodular GAVE,   received IV abx ceftriaxone/flagyl transitioned to ciprofloxacin and continued for 5 days after discharge for SBP prophy. Currently on this admission, patient started to have loose stool 5-6 episodes 1/14 that progressively worsen to diarrhea 1/15 with multiple episodes. Complained of dry-heave and bitter taste also. He was immediately put on oral vanco which he received 4 doses 125mg PO so far with good improvement. Diagnosed with toxin B and GDh test as active CDI. Clinically, the abdominal pain and watery diarrhea have resolved and he's planned to be discharged today.  Imaging CT AP unremarkable. Important to mention, patient is being work up for kidney/liver transplant, not currently listed for transplant yet; is considered immunosuppressed, long term used >3M high dose prednisone tapered, now on 10mg daily.  Other predisposing drug is pantoprazole that could be in the culprit. In the setting of first recurrence of CDI in which it could be attributed to rejuvenated spores vs previous abx exposure in the last month that exacerbate as a new flare up, it is prudent to mention acute and long term recommendations as listed below.        RECOMMENDATION:  - For first recurrence, 1st line therapy is fidaxomicin 200mg PO BID considering insurance coverage and copay.  - If 1st line therapy not accepted, patient requires longer duration of treatment with oral vancomycin with underlying immunosuppression.    - recommend oral vancomycin 125mg PO QID for 14 days with pulse tapered therapy (can be discussed with floor pharmacist for regimen)  - In future, if patient requires Abx exposure, we strongly recommend prophylaxis with oral Vancomycin 125mg PO BID throughout the course of abx followed by 5 days course after. Patient is recommend to avoid clindamycin (especially), 3rd/4th gen cephalosporins, fluoroquinolones if reasonable to do so. Consider pro-biotics every time with abx exposure.  - If repeat recurrence, patient is strongly suggested to follow up with ID and consider the fecal microbiota transplantation. Pt agreeable to it  - If continued prednisone therapy, consider PJP propyhlaxis, can be discussed with OP physician in charged       Bertin Aguillon DMD PGY-1 Oral&Maxillofacial surgery  ID Consultant  Pager [phone]  ________________________________________________________________    Consult Question:.  Admission Diagnosis: Abdominal pain, generalized [R10.84]  Hyperkalemia [E87.5]  End-stage liver disease (H) [K72.10]  Acute kidney injury (H) [N17.9]  Leukocytosis, unspecified type  [O89.756]         History of Present Illness:     67-year-old male with significant history of decompensated alcoholic cirrhosis (ESLD) c/b hepatic encephalopathy and esophageal varices s/p TIPS, paroxysmal atrial fibrillation, CKD IV not on HD, psoriatic arthritis, HTN, ANCA vasculitis, on long term prednisone, T2DM2 and recent C-diff infection diagnosed on 12/16 finished course of oral vanco on 12/27 with resolution.  Patient presents with progressing watery diarrhea until yesterday, nausea and episodes of dry heaves.  First recurrence C. difficile colitis found with toxin b test and GDH.   [facility] and discharge on cipro for 5 days. Admitted for variceal bleed and states that since discharge, he has had continuous belching and gassy that has not resolved. In the last 24 hours, he has had multiple episodes of non-bloody diarrhea-about 6 times on 01/15 and about 7 times today prior to presentation. He is on lactulose and typically has about 3-4 bowel movements daily. Also endorses a bitter taste (non-metallic) in his mouth. Endorsing dry-heaving however no emesis.   Was admitted from 12/16-12/21 for similar episode if frequent diarrhea and was found to have C-diff colitis for which he completed a course or oral vancomycin. He was on abx prior to C-diff infection mostly augmentin and doxy completed in late October. No recent travels, no sick contacts, no increase use of lactulose.      Recent Inflammatory Markers  Recent Labs   Lab Test 01/16/23  0526 01/15/23  1107 12/27/22  0856 12/21/22  0625 12/20/22  0733 12/19/22  0545 12/18/22  0610 12/17/22  0744 12/16/22  1652   CRP  --  19.20*  --   --   --   --   --   --  77.70*   WBC 16.2* 20.5* 15.5* 13.0* 14.0* 13.9* 13.8*   < > 18.2*    < > = values in this interval not displayed.       Recent culture results include:  Culture   Date Value Ref Range Status   12/16/2022 No Growth  Final   12/16/2022 No  Growth  Final                Review of Systems:   CONSTITUTIONAL:  No fevers or chills  EYES: negative for icterus  ENT:  negative for hearing loss, tinnitus and sore throat  RESPIRATORY:  negative for cough with sputum and dyspnea  CARDIOVASCULAR:  negative for chest pain, dyspnea  GASTROINTESTINAL:  negative for nausea, vomiting, diarrhea and constipation  GENITOURINARY:  negative for dysuria  HEME:  No easy bruising  INTEGUMENT:  negative for rash and pruritus  NEURO:  Negative for headache           Past Medical History:     Past Medical History:   Diagnosis Date     Alcoholic cirrhosis of liver with ascites (H) 10/11/2019     Arthritis     RA     Chronic kidney disease      Coronary artery disease     AFib 2016     Diabetes mellitus type 2, uncomplicated (H) 10/11/2019     History of blood transfusion     2016     History of hemochromatosis 10/11/2019     Hypertension      Hypokalemia      Obesity      PAF (paroxysmal atrial fibrillation) (H)      Psoriatic arthritis (H)             Past Surgical History:     Past Surgical History:   Procedure Laterality Date     APPENDECTOMY      Removed at 18 Years Old      COLONOSCOPY      2014 at [facility]      EYE SURGERY Bilateral     Cataract     GI SURGERY      TIPS     HERNIA REPAIR      History of bilateral inguinal hernia repair: 10/28/2014. Open hernia repair: 10/2017. Abdominal wound exploration and debridement 12/27/2017            Family History:     Family History   Problem Relation Age of Onset     Lung Cancer Mother      Colon Cancer Father      Lung Cancer Brother      Heart Failure Brother      Kidney Disease Brother      IMMUNE:  No history of immunodeficiency within the family.         Social History:     Social History     Tobacco Use     Smoking status: Never     Smokeless tobacco: Never   Substance Use Topics     Alcohol use: Not Currently     Comment: Last ETOH use was 12/31/2021     History   Sexual Activity     Sexual activity: Not on file        No results found for: HIV         Current Medications :       diltiazem ER COATED BEADS  120 mg Oral Q24H     pantoprazole  20 mg Oral Daily     rifaximin  550 mg Oral BID     sodium chloride (PF)  3 mL Intracatheter Q8H     vancomycin  125 mg Oral 4x Daily     zinc sulfate  220 mg Oral Daily            Allergies:   No Known Allergies         Physical Exam:   Vitals were reviewed  For the past 24 hours, the ranges for their vital signs:  Temp:  [97.5  F (36.4  C)-99.2  F (37.3  C)] 98.5  F (36.9  C)  Pulse:  [69-89] 82  Resp:  [12-21] 16  BP: (147-167)/(86-94) 147/87  SpO2:  [97 %-100 %] 100 %    Physical Examination:  GENERAL:  well-developed, well-nourished, in bed in no acute distress.   HEENT:  Head is normocephalic, atraumatic   EYES:  Eyes have anicteric sclerae without conjunctival injection or stigmata of endocarditis.    ENT:  Oropharynx is moist without exudates or ulcers. Tongue is midline  NECK:  Supple. No  Cervical lymphadenopathy  LUNGS:  Clear to auscultation bilateral.   CARDIOVASCULAR:  Regular rate and rhythm with no murmurs, gallops or rubs.  ABDOMEN:  Distended, umbilical hernia that is reducible  SKIN:  No acute rashes. Hematoma   NEUROLOGIC:  Grossly nonfocal. Active x4 extremities         Laboratory Data:         Hematology Studies    Recent Labs   Lab Test 01/16/23  0526 01/15/23  1107 12/27/22  0856 12/21/22  0625 12/20/22  0733 12/19/22  0545   WBC 16.2* 20.5* 15.5* 13.0* 14.0* 13.9*   HGB 11.8* 13.5 12.7* 11.0* 11.1* 11.0*   MCV 92 92 91 91 92 90   * 179 160 167 206 203       Immune Globulin Studies  No lab results found.    Metabolic Studies     Recent Labs   Lab Test 01/16/23  0526 01/15/23  1953 01/15/23  1352 01/15/23  1107 12/27/22  0856 12/21/22  0625   * 134*  --  136 137 137   POTASSIUM 5.2 5.4* 5.7* 5.4* 5.0 3.8   CHLORIDE 98 97*  --  97* 99 102   CO2 17* 15*  --  19* 13* 20*   .0* 113.0*  --  116.0* 83.4* 92.2*   CR 6.38* 6.33*  --  6.59* 5.15* 5.09*    GFRESTIMATED 9* 9*  --  9* 12* 12*       Hepatic Studies    Recent Labs   Lab Test 01/16/23  0526 01/15/23  1953 01/15/23  1107 12/27/22  0856 12/21/22  0625 12/20/22  0733   BILITOTAL 0.9 0.9 1.1 0.9 0.5 0.7   ALKPHOS 164* 187* 197* 202* 221* 210*   ALBUMIN 2.9* 3.3* 3.5 3.2* 2.7* 2.8*   AST 32 36 37 41 65* 59*   ALT 48 60* 65* 52* 62* 46       Thyroid Studies    Recent Labs   Lab Test 12/16/22  1652 11/22/22  0848   TSH 2.86 4.91*   T4  --  1.09       Microbiology:  Culture   Date Value Ref Range Status   12/16/2022 No Growth  Final   12/16/2022 No Growth  Final       Urine Studies    Recent Labs   Lab Test 01/15/23  1226 12/16/22  1917 11/22/22  0858   LEUKEST Negative Negative Negative   WBCU 3 <1  --        Vancomycin Levels  No lab results found.    Invalid input(s): VANCO    Virology:  CMV viral loads  No lab results found.  No results found for: CMQNT, CMVQ  EBV viral loads   No lab results found.  No results found for: EBVDN, EBRES, EBVDN, EBVSP, EBVPC, EBVPCR  HSV testing  No lab results found.  Hepatitis B Testing     Recent Labs   Lab Test 11/22/22  0848   HBCAB Nonreactive   HEPBANG Nonreactive     Hepatitis C Testing     Hepatitis C Antibody   Date Value Ref Range Status   11/22/2022 Nonreactive Nonreactive Final     7-Day Micro Results     Collected Updated Procedure Result Status      01/15/2023 1750 01/16/2023 0033 Enteric Bacteria and Virus Panel by AVERY Stool [[id number]]    Stool from Per Rectum    Final result Component Value   Campylobacter group Not Detected   Salmonella species Not Detected   Shigella species Not Detected   Vibrio group Not Detected   Rotavirus Not Detected   Shiga toxin 1 gene Not Detected   Shiga toxin 2 gene Not Detected   Norovirus I and II Not Detected   Yersinia enterocolitica Not Detected            01/15/2023 1750 01/15/2023 1852 C. difficile Toxin B PCR with reflex to C. difficile Antigen and Toxins A/B EIA [[id number]]    (Abnormal)   Stool from Per Rectum     Final result Component Value   C Difficile Toxin B by PCR Positive   Detection of C. difficile nucleic acid in stools confirms the presence of these organisms in diarrheal patients but may not indicate that C. difficile is the etiologic agent of the diarrhea. Results from the Xpert C. difficile assay should be interpreted in conjunction with other laboratory and clinical data available to the clinician.    Patients with a positive C. difficile PCR result will receive reflex GDH/Toxin Immunoassay testing. Please interpret the PCR test result in conjunction with GDH/Toxin Immunoassay results and the clinical status of patient.            01/15/2023 1750 01/15/2023 2011 C. difficile Antigen and Toxins A/B by Enzyme Immunoassay [[id number]]    (Abnormal)   Stool from Per Rectum    Final result Component Value   C. difficile GDH Antigen Positive   C. difficile Toxin Positive            01/15/2023 1505 01/15/2023 1637 Asymptomatic COVID-19 Virus (Coronavirus) by PCR Nasopharyngeal [[id number]]    Swab from Nasopharyngeal    Final result Component Value   SARS CoV2 PCR Negative   NEGATIVE: SARS-CoV-2 (COVID-19) RNA not detected, presumed negative.                Imaging:

## 2023-01-16 NOTE — UTILIZATION REVIEW
Admission Status; Secondary Review Determination    Under the authority of the Utilization Management Committee, the utilization review process indicated a secondary review on the above patient. The review outcome is based on review of the medical records, discussions with staff, and applying clinical experience noted on the date of the review.    (x) Inpatient Status Appropriate - This patient's medical care is consistent with medical management for inpatient care and reasonable inpatient medical practice.    RATIONALE FOR DETERMINATION: 67-year-old male with significant history of alcoholic cirrhosis c/b hepatic encephalopathy and esophageal varices s/p TIPS, paroxysmal atrial fibrillation, CKD IV, psoriatic arthritis, HTN, ANCA vasculitis, T2DM2 and recent C-diff infection.  Patient presents with frequent diarrhea, nausea and episodes of dry heaves.  Concern for recurrent C. difficile colitis which is especially complicated due to patient's end-stage liver disease and significant stage IV chronic kidney disease with acute on chronic kidney injury.  Patient will require hospitalization with careful management of IV fluids as patient remains at high risk for further kidney deterioration appropriate for inpatient management.    At the time of admission with the information available to the attending physician more than 2 nights Hospital complex care was anticipated, based on patient risk of adverse outcome if treated as outpatient and complex care required. Inpatient admission is appropriate based on the Medicare guidelines.    This document was produced using voice recognition software    The information on this document is developed by the utilization review team in order for the business office to ensure compliance. This only denotes the appropriateness of proper admission status and does not reflect the quality of care rendered.    The definitions of Inpatient Status and Observation Status used in making the  determination above are those provided in the CMS Coverage Manual, Chapter 1 and Chapter 6, section 70.4.    Sincerely,    Magdi Ahn MD  Utilization Review  Physician Advisor  [facility].

## 2023-01-16 NOTE — PLAN OF CARE
Goal Outcome Evaluation:      Plan of Care Reviewed With: patient, spouse    Overall Patient Progress: improvingOverall Patient Progress: improving

## 2023-01-16 NOTE — PLAN OF CARE
Goal Outcome Evaluation:    Plan of Care Reviewed With: patient, spouse    Overall Patient Progress: improving     A&Ox4. VSS ex HTN on RA. Pt c/o mild abdominal pain, no interventions needed. Vanco given x2. IV fluids discontinued. Had two soft BMs since 0700 1/16. Pt to discharge today pending infectious disease consult. Plans to see outpt nephrologist. Pt up ad ari, able to make needs known. Call light within reach.

## 2023-01-16 NOTE — PLAN OF CARE
Goal Outcome Evaluation:      Plan of Care Reviewed With: patient    Overall Patient Progress: improvingOverall Patient Progress: improving    Outcome Evaluation: AxOx4, VSS ex htn on RA. C/o mild abd discomfort, denies interventions. NS d/c overnight. Patient up ad ari to bathroom. patient had 4 loose bms since 1900 1/15. Denied nausea. Able to make needs known, call light within reach. Cont poc.

## 2023-01-17 NOTE — DISCHARGE SUMMARY
[facility]  Discharge Summary - Medicine & Pediatrics       Date of Admission:  1/15/2023  Date of Discharge:  1/16/2023  7:51 PM  Discharging Provider: Dr. Boogie  Discharge Service: Medicine Service, [facility] TEAM 2    Discharge Diagnoses   Recurrent C-Diff Infection  Intermittent Belching  Elevated Creatinine  CKD 4  Alcoholic Cirrhosis  Hyperkalemia  Demand Ischemia  AGMA  Paroxysmal A-Fib  Elevated Alkaline Phosphatase    Follow-ups Needed After Discharge   Follow-up Appointments     Follow Up and recommended labs and tests      Follow up with primary care provider, Physician No Ref-Primary, within 7   days for hospital follow- up-Starting PJP prophylaxis given steroid   medication and immunocompromised status.  No follow up labs or test are   needed.             Unresulted Labs Ordered in the Past 30 Days of this Admission     No orders found for last 31 day(s).          Discharge Disposition   Discharged to home  Condition at discharge: Stable      Hospital Course   Damion Quinones is a 67 year old male who has a history of alcoholic cirrhosis c/b hepatic encephalopathy and esophageal varices s/p TIPS, paroxysmal atrial fibrillation, CKD, psoriatic arthritis, HTN, ANCA vasculitis, T2DM2 and recent C-diff infection. Admitted for recurrent C-diff infection.He endorsed multiple Episodes of non-bloody diarrhea in the last 24 hours. CT A/P with no acute concerns. No recent abx use. No recent travels or sick contact. Also with intermittent belching not associated with meals. He was diagnosed and treated for C.diff from 12/16-12/21 and completed a 10 day course of vancomycin. Also had a recent variceal bleed 3 weeks prior to presentation and was treated with IV abx ceftriaxone/flagyl transitioned to ciprofloxacin and continued for 5 days after discharge for SBP prophylaxis.    He was started on vancomycin on during this admission and was seen by infectious disease team with  "the recommendation below:    Patient is being work up for kidney/liver transplant, not currently listed for transplant yet; is considered immunosuppressed, long term used >3M high dose prednisone tapered, now on 10mg daily.  Other predisposing drug is pantoprazole that could be in the culprit. In the setting of first recurrence of CDI in which it could be attributed to rejuvenated spores vs previous abx exposure in the last month that exacerbate as a new flare up, it is prudent to mention acute and long term recommendations as listed below.         RECOMMENDATION:  - For first recurrence, 1st line therapy is fidaxomicin 200mg PO BID   - If 1st line therapy not accepted, patient requires longer duration of treatment with oral vancomycin  - In future, if patient requires Abx exposure, we strongly recommend prophylaxis with oral Vancomycin 125mg PO BID throughout the course of abx followed by 5 days course after. Patient is recommend to avoid clindamycin (especially), 3rd/4th gen cephalosporins, fluoroquinolones if reasonable to do so. Consider pro-biotics every time with abx exposure.  - If repeat recurrence, patient is strongly suggested to follow up with ID and consider the fecal microbiota transplantation. 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 Creatinine of 6.5 however does not meet criteria for PATI. 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 " Elevated creatinine likely due to hypovolemia in setting of G.I loss. Minimal improvement in creatinine prior to discharge. No urgent need for dialysis. Discussing with inpatient nephrology, patient should follow up with outpatient nephrologist.  -Patient has follow up appointment with Nephrologist a week from disharge date      Alcohol Cirrhosis   C/b by ascites, hepatic encephalopathy, esophageal varices s/p TIPS and variceal banding. Currently undergoing Liver transplant evaluation. Held diuretics during admission.     MELD-Na score: 24 at 1/16/2023  5:26 AM  MELD score: 21 at 1/16/2023  5:26 AM  Calculated from:  Serum Creatinine: 6.38 mg/dL (Using max of 4 mg/dL) at 1/16/2023  5:26 AM  Serum Sodium: 133 mmol/L at 1/16/2023  5:26 AM  Total Bilirubin: 0.9 mg/dL (Using min of 1 mg/dL) at 1/16/2023  5:26 AM  INR(ratio): 1.14 at 1/15/2023 11:07 AM  Age: 67 years    -Has follow up appointment with gastroenterologist      Elevated Alkaline Phosphatase  Elevated ALT  Improved prior to discharge    Demand Ischemia, Resolved  Troponin of 141 on presentation. EKG notable for Afib; patient with no symptoms concerning for ACS. Troponin trended down to 108. Had a recent Echo (12/2022) with no acute concerns so no new echo done during this admission.        AGMA  Elevated anion gap of 20 on presentation. Likely in setting of G.I Loss and underlying CKD.Improving prior to discharge    Paroxysmal A-Fib: Not on AC; Cont. PTA Diltiazem    Psoriatic Arthritis  On tapered steroid course. Currently on Prednisone 10mg daily  -Per discussion with inpatient diabetic team, If continued prednisone therapy, consider PJP propyhlaxis, can be discussed with OP physician in charge.  -Patient to follow up with PCP concerning this in the next 7 days.         Consultations This Hospital Stay   CARE MANAGEMENT / SOCIAL WORK IP CONSULT  INFECTIOUS DISEASE GENERAL ADULT IP CONSULT    Code Status   Full Code       The patient was discussed with   Chuyita Garcia MD  [facility] [address] [address]  Phone: [phone]  ______________________________________________________________________    Physical Exam   Vital Signs: Temp: 98.5  F (36.9  C) Temp src: Oral BP: (!) 147/87 Pulse: 82   Resp: 16 SpO2: 100 % O2 Device: None (Room air)    Weight: 215 lbs 0 oz  General Appearance:     NAD, Sitting in chair, Conversant  Respiratory: CTAB, Breathing comfortable on room air  Cardiovascular: RRR. No m/r/g  Abd: Soft, distended, no abdominal tenderness  Extremities: No LE peripheral edema       Primary Care Physician   Physician No Ref-Primary    Discharge Orders      Reason for your hospital stay    Dear Damion Quinones    You were hospitalized at [facility] with a recurrent C-Diff Infection  Over this hospitalization your symptoms improved and today you are ready to be discharged.  You were treated with vancomycin for 2 days but on discharge you are to start taking Fidaxomicin (Dificid) for 10 days. If you develop fever, shortness of breath, light headedness, worsening abdominal pain,worsening diarrhea, chest pain, severe vomiting or any other worrisome symptoms, please seek medical attention.     We are suggesting that you Hold your Diuretics if you are having multiple bowel movements through the day and stay hydrated        Please set up an appointment with:  1. Your PCP within 7-14 days. We would like you to discuss with your PCP about starting a PJP prevention medication given that you are currently taking steroids and weak immune system, this increases your chances of  a PJP (Pneumocystis pneumonia) infection.    2. Follow up with your nephrologist to monitor your kidney function    It was a pleasure meeting with you. Thank you for allowing me and my team the privilege of caring for you. You are the reason we are here, and I truly hope we provided you with the excellent  service you deserve. Please let us know if there is anything else we can do for you so that we can be sure you are leaving completely satisfied with your care experience.     Activity    Your activity upon discharge: activity as tolerated     Follow Up and recommended labs and tests    Follow up with primary care provider, Physician No Ref-Primary, within 7 days for hospital follow- up-Starting PJP prophylaxis given steroid medication and immunocompromised status.  No follow up labs or test are needed.     Diet    Follow this diet upon discharge: Orders Placed This Encounter      Regular Diet Adult       Significant Results and Procedures   Most Recent 3 CBC's:Recent Labs   Lab Test 01/16/23  0526 01/15/23  1107 12/27/22  0856   WBC 16.2* 20.5* 15.5*   HGB 11.8* 13.5 12.7*   MCV 92 92 91   * 179 160       Discharge Medications   Current Discharge Medication List      START taking these medications    Details   !! fidaxomicin (DIFICID) 200 MG tablet Take 1 tablet (200 mg) by mouth 2 times daily for 10 days  Qty: 20 tablet, Refills: 0    Comments: Run for insurance coverage. Please call [phone]  Associated Diagnoses: C. difficile colitis      !! fidaxomicin (DIFICID) 200 MG tablet Take 1 tablet (200 mg) by mouth 2 times daily  Qty: 20 tablet, Refills: 0    Associated Diagnoses: C. difficile colitis       !! - Potential duplicate medications found. Please discuss with provider.      CONTINUE these medications which have NOT CHANGED    Details   bumetanide (BUMEX) 1 MG tablet 2 mg 2 times daily      Calcium Carbonate-Vitamin D 500-3.125 MG-MCG TABS Take 1 tablet by mouth 2 times daily      diltiazem ER (DILT-XR) 120 MG 24 hr capsule Take 120 mg by mouth      folic acid (FOLVITE) 1 MG tablet TAKE FIVE TABLETS BY MOUTH EVERY DAY      lactulose (CHRONULAC) 10 GM/15ML solution TAKE 45 ML BY MOUTH THREE TIMES DAILY      multivitamin w/minerals (THERA-VIT-M) tablet Take 1 tablet by mouth daily      pantoprazole  (PROTONIX) 40 MG EC tablet Take 20 mg by mouth daily      potassium chloride (KLOR-CON) 20 MEQ packet Take 20 mEq by mouth 2 times daily      predniSONE (DELTASONE) 5 MG tablet Take 10 mg by mouth      spironolactone (ALDACTONE) 50 MG tablet Take 50 mg by mouth daily      XIFAXAN 550 MG TABS tablet Take 550 mg by mouth 2 times daily      Zinc Sulfate 220 (50 Zn) MG TABS Take 220 mg by mouth         STOP taking these medications       vancomycin (VANCOCIN) 125 MG capsule Comments:   Reason for Stopping:             Allergies   No Known Allergies

## 2023-01-23 ENCOUNTER — APPOINTMENT (OUTPATIENT)
Dept: ULTRASOUND IMAGING | Facility: CLINIC | Age: 68
DRG: 441 | End: 2023-01-23
Payer: MEDICARE

## 2023-01-23 ENCOUNTER — APPOINTMENT (OUTPATIENT)
Dept: CT IMAGING | Facility: CLINIC | Age: 68
DRG: 441 | End: 2023-01-23
Attending: EMERGENCY MEDICINE
Payer: MEDICARE

## 2023-01-23 ENCOUNTER — HOSPITAL ENCOUNTER (INPATIENT)
Facility: CLINIC | Age: 68
LOS: 5 days | Discharge: HOME HEALTH | DRG: 441 | End: 2023-01-28
Attending: EMERGENCY MEDICINE | Admitting: HOSPITALIST
Payer: MEDICARE

## 2023-01-23 ENCOUNTER — DOCUMENTATION ONLY (OUTPATIENT)
Dept: TRANSPLANT | Facility: CLINIC | Age: 68
End: 2023-01-23

## 2023-01-23 DIAGNOSIS — R10.9 ABDOMINAL PAIN, UNSPECIFIED ABDOMINAL LOCATION: ICD-10-CM

## 2023-01-23 DIAGNOSIS — K76.82 HEPATIC ENCEPHALOPATHY (H): ICD-10-CM

## 2023-01-23 DIAGNOSIS — K72.10 END-STAGE LIVER DISEASE (H): ICD-10-CM

## 2023-01-23 DIAGNOSIS — K74.60 CIRRHOSIS OF LIVER WITHOUT ASCITES, UNSPECIFIED HEPATIC CIRRHOSIS TYPE (H): ICD-10-CM

## 2023-01-23 DIAGNOSIS — N18.6 ESRD (END STAGE RENAL DISEASE) ON DIALYSIS (H): ICD-10-CM

## 2023-01-23 DIAGNOSIS — R41.82 ALTERED MENTAL STATUS, UNSPECIFIED ALTERED MENTAL STATUS TYPE: ICD-10-CM

## 2023-01-23 DIAGNOSIS — K70.31 ALCOHOLIC CIRRHOSIS OF LIVER WITH ASCITES (H): Primary | ICD-10-CM

## 2023-01-23 DIAGNOSIS — Z99.2 ESRD (END STAGE RENAL DISEASE) ON DIALYSIS (H): ICD-10-CM

## 2023-01-23 DIAGNOSIS — I82.401 ACUTE DEEP VEIN THROMBOSIS (DVT) OF RIGHT LOWER EXTREMITY, UNSPECIFIED VEIN (H): ICD-10-CM

## 2023-01-23 LAB
ALBUMIN SERPL BCG-MCNC: 3.2 G/DL (ref 3.5–5.2)
ALBUMIN SERPL BCG-MCNC: 3.2 G/DL (ref 3.5–5.2)
ALBUMIN UR-MCNC: 20 MG/DL
ALP SERPL-CCNC: 220 U/L (ref 40–129)
ALP SERPL-CCNC: 246 U/L (ref 40–129)
ALT SERPL W P-5'-P-CCNC: 56 U/L (ref 10–50)
ALT SERPL W P-5'-P-CCNC: 64 U/L (ref 10–50)
AMMONIA PLAS-SCNC: 186 UMOL/L (ref 16–60)
ANION GAP SERPL CALCULATED.3IONS-SCNC: 19 MMOL/L (ref 7–15)
ANION GAP SERPL CALCULATED.3IONS-SCNC: 20 MMOL/L (ref 7–15)
APPEARANCE UR: CLEAR
AST SERPL W P-5'-P-CCNC: 51 U/L (ref 10–50)
AST SERPL W P-5'-P-CCNC: 58 U/L (ref 10–50)
BASOPHILS # BLD AUTO: 0.1 10E3/UL (ref 0–0.2)
BASOPHILS # BLD AUTO: 0.1 10E3/UL (ref 0–0.2)
BASOPHILS NFR BLD AUTO: 0 %
BASOPHILS NFR BLD AUTO: 1 %
BILIRUB SERPL-MCNC: 0.9 MG/DL
BILIRUB SERPL-MCNC: 0.9 MG/DL
BILIRUB UR QL STRIP: NEGATIVE
BUN SERPL-MCNC: 93.8 MG/DL (ref 8–23)
BUN SERPL-MCNC: 94.1 MG/DL (ref 8–23)
CALCIUM SERPL-MCNC: 9.3 MG/DL (ref 8.8–10.2)
CALCIUM SERPL-MCNC: 9.4 MG/DL (ref 8.8–10.2)
CHLORIDE SERPL-SCNC: 101 MMOL/L (ref 98–107)
CHLORIDE SERPL-SCNC: 99 MMOL/L (ref 98–107)
COLOR UR AUTO: ABNORMAL
CREAT SERPL-MCNC: 6.28 MG/DL (ref 0.67–1.17)
CREAT SERPL-MCNC: 6.35 MG/DL (ref 0.67–1.17)
DEPRECATED HCO3 PLAS-SCNC: 17 MMOL/L (ref 22–29)
DEPRECATED HCO3 PLAS-SCNC: 17 MMOL/L (ref 22–29)
EOSINOPHIL # BLD AUTO: 0.1 10E3/UL (ref 0–0.7)
EOSINOPHIL # BLD AUTO: 0.2 10E3/UL (ref 0–0.7)
EOSINOPHIL NFR BLD AUTO: 1 %
EOSINOPHIL NFR BLD AUTO: 1 %
ERYTHROCYTE [DISTWIDTH] IN BLOOD BY AUTOMATED COUNT: 14.7 % (ref 10–15)
ERYTHROCYTE [DISTWIDTH] IN BLOOD BY AUTOMATED COUNT: 14.7 % (ref 10–15)
ERYTHROCYTE [DISTWIDTH] IN BLOOD BY AUTOMATED COUNT: 14.9 % (ref 10–15)
GFR SERPL CREATININE-BSD FRML MDRD: 9 ML/MIN/1.73M2
GFR SERPL CREATININE-BSD FRML MDRD: 9 ML/MIN/1.73M2
GLUCOSE SERPL-MCNC: 109 MG/DL (ref 70–99)
GLUCOSE SERPL-MCNC: 124 MG/DL (ref 70–99)
GLUCOSE UR STRIP-MCNC: NEGATIVE MG/DL
HCT VFR BLD AUTO: 39.2 % (ref 40–53)
HCT VFR BLD AUTO: 41.1 % (ref 40–53)
HCT VFR BLD AUTO: 42.4 % (ref 40–53)
HGB BLD-MCNC: 12.8 G/DL (ref 13.3–17.7)
HGB BLD-MCNC: 13.1 G/DL (ref 13.3–17.7)
HGB BLD-MCNC: 13.7 G/DL (ref 13.3–17.7)
HGB UR QL STRIP: NEGATIVE
HOLD SPECIMEN: NORMAL
HOLD SPECIMEN: NORMAL
IMM GRANULOCYTES # BLD: 0.6 10E3/UL
IMM GRANULOCYTES # BLD: 0.6 10E3/UL
IMM GRANULOCYTES NFR BLD: 4 %
IMM GRANULOCYTES NFR BLD: 4 %
INR PPP: 1.23 (ref 0.85–1.15)
KETONES UR STRIP-MCNC: NEGATIVE MG/DL
LEUKOCYTE ESTERASE UR QL STRIP: NEGATIVE
LIPASE SERPL-CCNC: 149 U/L (ref 13–60)
LYMPHOCYTES # BLD AUTO: 1 10E3/UL (ref 0.8–5.3)
LYMPHOCYTES # BLD AUTO: 1.4 10E3/UL (ref 0.8–5.3)
LYMPHOCYTES NFR BLD AUTO: 7 %
LYMPHOCYTES NFR BLD AUTO: 9 %
MCH RBC QN AUTO: 28.9 PG (ref 26.5–33)
MCH RBC QN AUTO: 29.3 PG (ref 26.5–33)
MCH RBC QN AUTO: 29.5 PG (ref 26.5–33)
MCHC RBC AUTO-ENTMCNC: 31.9 G/DL (ref 31.5–36.5)
MCHC RBC AUTO-ENTMCNC: 32.3 G/DL (ref 31.5–36.5)
MCHC RBC AUTO-ENTMCNC: 32.7 G/DL (ref 31.5–36.5)
MCV RBC AUTO: 90 FL (ref 78–100)
MCV RBC AUTO: 91 FL (ref 78–100)
MCV RBC AUTO: 91 FL (ref 78–100)
MONOCYTES # BLD AUTO: 1.3 10E3/UL (ref 0–1.3)
MONOCYTES # BLD AUTO: 1.6 10E3/UL (ref 0–1.3)
MONOCYTES NFR BLD AUTO: 10 %
MONOCYTES NFR BLD AUTO: 8 %
MUCOUS THREADS #/AREA URNS LPF: PRESENT /LPF
NEUTROPHILS # BLD AUTO: 12.2 10E3/UL (ref 1.6–8.3)
NEUTROPHILS # BLD AUTO: 12.3 10E3/UL (ref 1.6–8.3)
NEUTROPHILS NFR BLD AUTO: 76 %
NEUTROPHILS NFR BLD AUTO: 79 %
NITRATE UR QL: NEGATIVE
NRBC # BLD AUTO: 0 10E3/UL
NRBC # BLD AUTO: 0 10E3/UL
NRBC BLD AUTO-RTO: 0 /100
NRBC BLD AUTO-RTO: 0 /100
PH UR STRIP: 6 [PH] (ref 5–7)
PLATELET # BLD AUTO: 106 10E3/UL (ref 150–450)
PLATELET # BLD AUTO: 111 10E3/UL (ref 150–450)
PLATELET # BLD AUTO: 123 10E3/UL (ref 150–450)
POTASSIUM SERPL-SCNC: 4.8 MMOL/L (ref 3.4–5.3)
POTASSIUM SERPL-SCNC: 5.1 MMOL/L (ref 3.4–5.3)
PROT SERPL-MCNC: 5.7 G/DL (ref 6.4–8.3)
PROT SERPL-MCNC: 5.7 G/DL (ref 6.4–8.3)
RADIOLOGIST FLAGS: ABNORMAL
RBC # BLD AUTO: 4.37 10E6/UL (ref 4.4–5.9)
RBC # BLD AUTO: 4.53 10E6/UL (ref 4.4–5.9)
RBC # BLD AUTO: 4.65 10E6/UL (ref 4.4–5.9)
RBC URINE: <1 /HPF
SARS-COV-2 RNA RESP QL NAA+PROBE: NEGATIVE
SODIUM SERPL-SCNC: 136 MMOL/L (ref 136–145)
SODIUM SERPL-SCNC: 137 MMOL/L (ref 136–145)
SP GR UR STRIP: 1.01 (ref 1–1.03)
UFH PPP CHRO-ACNC: >1.1 IU/ML
UROBILINOGEN UR STRIP-MCNC: NORMAL MG/DL
WBC # BLD AUTO: 15.3 10E3/UL (ref 4–11)
WBC # BLD AUTO: 16 10E3/UL (ref 4–11)
WBC # BLD AUTO: 16.1 10E3/UL (ref 4–11)
WBC URINE: 8 /HPF

## 2023-01-23 PROCEDURE — 70450 CT HEAD/BRAIN W/O DYE: CPT | Mod: 26 | Performed by: RADIOLOGY

## 2023-01-23 PROCEDURE — 85610 PROTHROMBIN TIME: CPT | Performed by: STUDENT IN AN ORGANIZED HEALTH CARE EDUCATION/TRAINING PROGRAM

## 2023-01-23 PROCEDURE — 81001 URINALYSIS AUTO W/SCOPE: CPT | Performed by: EMERGENCY MEDICINE

## 2023-01-23 PROCEDURE — 85520 HEPARIN ASSAY: CPT

## 2023-01-23 PROCEDURE — 99285 EMERGENCY DEPT VISIT HI MDM: CPT | Performed by: EMERGENCY MEDICINE

## 2023-01-23 PROCEDURE — 85025 COMPLETE CBC W/AUTO DIFF WBC: CPT | Performed by: EMERGENCY MEDICINE

## 2023-01-23 PROCEDURE — 120N000002 HC R&B MED SURG/OB UMMC

## 2023-01-23 PROCEDURE — 36415 COLL VENOUS BLD VENIPUNCTURE: CPT | Performed by: EMERGENCY MEDICINE

## 2023-01-23 PROCEDURE — 36415 COLL VENOUS BLD VENIPUNCTURE: CPT | Performed by: HOSPITALIST

## 2023-01-23 PROCEDURE — 74176 CT ABD & PELVIS W/O CONTRAST: CPT | Mod: 26 | Performed by: RADIOLOGY

## 2023-01-23 PROCEDURE — 93975 VASCULAR STUDY: CPT

## 2023-01-23 PROCEDURE — 36415 COLL VENOUS BLD VENIPUNCTURE: CPT

## 2023-01-23 PROCEDURE — 74176 CT ABD & PELVIS W/O CONTRAST: CPT

## 2023-01-23 PROCEDURE — 99223 1ST HOSP IP/OBS HIGH 75: CPT | Performed by: NURSE PRACTITIONER

## 2023-01-23 PROCEDURE — U0003 INFECTIOUS AGENT DETECTION BY NUCLEIC ACID (DNA OR RNA); SEVERE ACUTE RESPIRATORY SYNDROME CORONAVIRUS 2 (SARS-COV-2) (CORONAVIRUS DISEASE [COVID-19]), AMPLIFIED PROBE TECHNIQUE, MAKING USE OF HIGH THROUGHPUT TECHNOLOGIES AS DESCRIBED BY CMS-2020-01-R: HCPCS | Performed by: STUDENT IN AN ORGANIZED HEALTH CARE EDUCATION/TRAINING PROGRAM

## 2023-01-23 PROCEDURE — 83690 ASSAY OF LIPASE: CPT | Performed by: EMERGENCY MEDICINE

## 2023-01-23 PROCEDURE — 93975 VASCULAR STUDY: CPT | Mod: 26 | Performed by: RADIOLOGY

## 2023-01-23 PROCEDURE — 93971 EXTREMITY STUDY: CPT | Mod: 26 | Performed by: RADIOLOGY

## 2023-01-23 PROCEDURE — 85027 COMPLETE CBC AUTOMATED: CPT

## 2023-01-23 PROCEDURE — 84300 ASSAY OF URINE SODIUM: CPT | Performed by: INTERNAL MEDICINE

## 2023-01-23 PROCEDURE — 250N000013 HC RX MED GY IP 250 OP 250 PS 637: Performed by: STUDENT IN AN ORGANIZED HEALTH CARE EDUCATION/TRAINING PROGRAM

## 2023-01-23 PROCEDURE — 250N000012 HC RX MED GY IP 250 OP 636 PS 637: Performed by: STUDENT IN AN ORGANIZED HEALTH CARE EDUCATION/TRAINING PROGRAM

## 2023-01-23 PROCEDURE — 99285 EMERGENCY DEPT VISIT HI MDM: CPT | Mod: 25 | Performed by: EMERGENCY MEDICINE

## 2023-01-23 PROCEDURE — 250N000013 HC RX MED GY IP 250 OP 250 PS 637: Performed by: EMERGENCY MEDICINE

## 2023-01-23 PROCEDURE — 36415 COLL VENOUS BLD VENIPUNCTURE: CPT | Performed by: STUDENT IN AN ORGANIZED HEALTH CARE EDUCATION/TRAINING PROGRAM

## 2023-01-23 PROCEDURE — 80321 ALCOHOLS BIOMARKERS 1OR 2: CPT | Performed by: NURSE PRACTITIONER

## 2023-01-23 PROCEDURE — 82140 ASSAY OF AMMONIA: CPT | Performed by: EMERGENCY MEDICINE

## 2023-01-23 PROCEDURE — 80053 COMPREHEN METABOLIC PANEL: CPT | Performed by: EMERGENCY MEDICINE

## 2023-01-23 PROCEDURE — 85025 COMPLETE CBC W/AUTO DIFF WBC: CPT | Performed by: STUDENT IN AN ORGANIZED HEALTH CARE EDUCATION/TRAINING PROGRAM

## 2023-01-23 PROCEDURE — 99207 PR APP CREDIT; MD BILLING SHARED VISIT: CPT | Performed by: STUDENT IN AN ORGANIZED HEALTH CARE EDUCATION/TRAINING PROGRAM

## 2023-01-23 PROCEDURE — 70450 CT HEAD/BRAIN W/O DYE: CPT

## 2023-01-23 PROCEDURE — 84450 TRANSFERASE (AST) (SGOT): CPT | Performed by: STUDENT IN AN ORGANIZED HEALTH CARE EDUCATION/TRAINING PROGRAM

## 2023-01-23 PROCEDURE — 250N000013 HC RX MED GY IP 250 OP 250 PS 637

## 2023-01-23 PROCEDURE — 93971 EXTREMITY STUDY: CPT | Mod: RT

## 2023-01-23 PROCEDURE — 250N000011 HC RX IP 250 OP 636

## 2023-01-23 PROCEDURE — 87040 BLOOD CULTURE FOR BACTERIA: CPT | Performed by: HOSPITALIST

## 2023-01-23 PROCEDURE — 84155 ASSAY OF PROTEIN SERUM: CPT | Performed by: STUDENT IN AN ORGANIZED HEALTH CARE EDUCATION/TRAINING PROGRAM

## 2023-01-23 PROCEDURE — 258N000003 HC RX IP 258 OP 636: Performed by: STUDENT IN AN ORGANIZED HEALTH CARE EDUCATION/TRAINING PROGRAM

## 2023-01-23 RX ORDER — NICOTINE POLACRILEX 4 MG/1
40 GUM, CHEWING ORAL 2 TIMES DAILY
COMMUNITY
End: 2023-04-17

## 2023-01-23 RX ORDER — MULTIPLE VITAMINS W/ MINERALS TAB 9MG-400MCG
1 TAB ORAL DAILY
Status: DISCONTINUED | OUTPATIENT
Start: 2023-01-23 | End: 2023-01-28 | Stop reason: HOSPADM

## 2023-01-23 RX ORDER — PREDNISONE 10 MG/1
10 TABLET ORAL DAILY
Status: DISCONTINUED | OUTPATIENT
Start: 2023-01-23 | End: 2023-01-28 | Stop reason: HOSPADM

## 2023-01-23 RX ORDER — PANTOPRAZOLE SODIUM 20 MG/1
20 TABLET, DELAYED RELEASE ORAL DAILY
Status: DISCONTINUED | OUTPATIENT
Start: 2023-01-23 | End: 2023-01-28 | Stop reason: HOSPADM

## 2023-01-23 RX ORDER — HEPARIN SODIUM 10000 [USP'U]/100ML
0-5000 INJECTION, SOLUTION INTRAVENOUS CONTINUOUS
Status: DISCONTINUED | OUTPATIENT
Start: 2023-01-23 | End: 2023-01-28

## 2023-01-23 RX ORDER — DILTIAZEM HYDROCHLORIDE 120 MG/1
120 CAPSULE, COATED, EXTENDED RELEASE ORAL DAILY
Status: DISCONTINUED | OUTPATIENT
Start: 2023-01-23 | End: 2023-01-28 | Stop reason: HOSPADM

## 2023-01-23 RX ORDER — LACTULOSE 10 G/15ML
20 SOLUTION ORAL ONCE
Status: COMPLETED | OUTPATIENT
Start: 2023-01-23 | End: 2023-01-23

## 2023-01-23 RX ORDER — POLYETHYLENE GLYCOL 3350 17 G/17G
17 POWDER, FOR SOLUTION ORAL 3 TIMES DAILY
Status: DISCONTINUED | OUTPATIENT
Start: 2023-01-23 | End: 2023-01-28 | Stop reason: HOSPADM

## 2023-01-23 RX ORDER — ONDANSETRON 2 MG/ML
4 INJECTION INTRAMUSCULAR; INTRAVENOUS EVERY 6 HOURS PRN
Status: DISCONTINUED | OUTPATIENT
Start: 2023-01-23 | End: 2023-01-28 | Stop reason: HOSPADM

## 2023-01-23 RX ORDER — LACTULOSE 10 G/15ML
20 SOLUTION ORAL 3 TIMES DAILY
Status: DISCONTINUED | OUTPATIENT
Start: 2023-01-23 | End: 2023-01-28 | Stop reason: HOSPADM

## 2023-01-23 RX ORDER — SODIUM CHLORIDE, SODIUM LACTATE, POTASSIUM CHLORIDE, CALCIUM CHLORIDE 600; 310; 30; 20 MG/100ML; MG/100ML; MG/100ML; MG/100ML
INJECTION, SOLUTION INTRAVENOUS CONTINUOUS
Status: DISCONTINUED | OUTPATIENT
Start: 2023-01-23 | End: 2023-01-23

## 2023-01-23 RX ORDER — FOLIC ACID 1 MG/1
5 TABLET ORAL DAILY
Status: DISCONTINUED | OUTPATIENT
Start: 2023-01-23 | End: 2023-01-28 | Stop reason: HOSPADM

## 2023-01-23 RX ORDER — LIDOCAINE 40 MG/G
CREAM TOPICAL
Status: DISCONTINUED | OUTPATIENT
Start: 2023-01-23 | End: 2023-01-28 | Stop reason: HOSPADM

## 2023-01-23 RX ORDER — ZINC SULFATE 50(220)MG
220 CAPSULE ORAL DAILY
COMMUNITY
End: 2023-02-07

## 2023-01-23 RX ADMIN — FIDAXOMICIN 200 MG: 200 TABLET, FILM COATED ORAL at 10:00

## 2023-01-23 RX ADMIN — PANTOPRAZOLE SODIUM 20 MG: 20 TABLET, DELAYED RELEASE ORAL at 08:14

## 2023-01-23 RX ADMIN — LACTULOSE 20 G: 20 SOLUTION ORAL at 19:39

## 2023-01-23 RX ADMIN — POLYETHYLENE GLYCOL 3350 17 G: 17 POWDER, FOR SOLUTION ORAL at 17:34

## 2023-01-23 RX ADMIN — ONDANSETRON 4 MG: 2 INJECTION INTRAMUSCULAR; INTRAVENOUS at 12:49

## 2023-01-23 RX ADMIN — LACTULOSE 20 G: 20 SOLUTION ORAL at 13:58

## 2023-01-23 RX ADMIN — FOLIC ACID 5 MG: 1 TABLET ORAL at 08:25

## 2023-01-23 RX ADMIN — LACTULOSE 20 G: 20 SOLUTION ORAL at 08:15

## 2023-01-23 RX ADMIN — Medication 1 TABLET: at 08:15

## 2023-01-23 RX ADMIN — LACTULOSE 20 G: 20 SOLUTION ORAL at 03:56

## 2023-01-23 RX ADMIN — DILTIAZEM HYDROCHLORIDE 120 MG: 120 CAPSULE, COATED, EXTENDED RELEASE ORAL at 08:15

## 2023-01-23 RX ADMIN — PREDNISONE 10 MG: 5 TABLET ORAL at 08:16

## 2023-01-23 RX ADMIN — FIDAXOMICIN 200 MG: 200 TABLET, FILM COATED ORAL at 20:52

## 2023-01-23 RX ADMIN — HEPARIN SODIUM 1700 UNITS/HR: 10000 INJECTION, SOLUTION INTRAVENOUS at 17:30

## 2023-01-23 RX ADMIN — SODIUM CHLORIDE, POTASSIUM CHLORIDE, SODIUM LACTATE AND CALCIUM CHLORIDE 1000 ML: 600; 310; 30; 20 INJECTION, SOLUTION INTRAVENOUS at 07:01

## 2023-01-23 RX ADMIN — RIFAXIMIN 550 MG: 550 TABLET ORAL at 20:52

## 2023-01-23 RX ADMIN — POLYETHYLENE GLYCOL 3350 17 G: 17 POWDER, FOR SOLUTION ORAL at 19:39

## 2023-01-23 ASSESSMENT — ACTIVITIES OF DAILY LIVING (ADL)
ADLS_ACUITY_SCORE: 37
ADLS_ACUITY_SCORE: 45
ADLS_ACUITY_SCORE: 35
ADLS_ACUITY_SCORE: 37
ADLS_ACUITY_SCORE: 45
ADLS_ACUITY_SCORE: 45
ADLS_ACUITY_SCORE: 37
ADLS_ACUITY_SCORE: 37
ADLS_ACUITY_SCORE: 45

## 2023-01-23 NOTE — LETTER
[facility] [address] [address]  [phone]    FACSIMILE TRANSMITTAL SHEET    TO [facility] and [facility]  FAX NUMBER:[phone]  PHONE NUMBER: [phone]     FROM: Dominic but call SW following   PHONE:[phone]  DATE: 01/26/23  IF YOU DID NOT RECEIVE THE CORRECT NUMBER OF PAGES OR THE FAX DID NOT COME THROUGH CLEARLY, PLEASE CALL THE SENDER     CONFIDENTIALITY STATEMENT: Confidential information that may accompany this transmission contains protected health information under state and federal law and is legally privileged. This information is intended only for the use of the individual or entity named above and may be used only for carrying out treatment, payment or other healthcare operations. The recipient or person responsible for delivering this information is prohibited by law from disclosing this information without proper authorization to any other party, unless required to do so by law or regulation. If you are not the intended recipient, you are hereby notified that any review, dissemination, distribution, or copying of this message is strictly prohibited. If you have received this communication in error, please destroy the materials and contact us immediately by calling the number listed above. No response indicates that the information was received by the appropriate authorized party

## 2023-01-23 NOTE — PHARMACY-ADMISSION MEDICATION HISTORY
Admission Medication History Completed by Pharmacy    See [facility] Admission Navigator for allergy information, preferred outpatient pharmacy, prior to admission medications and immunization status.     Medication History Sources:     Patient's wife Apoorva    Pharmacy filling history through [facility]    Changes made to PTA medication list (reason):    Added: Zinc    Deleted: None  Changed: Pantoprazole to omeprazole, lactulose from 45ml 2-3x daily to 30ml (20g) TID, prednisone clarified to 10mg daily for now, potassium from 40mEq AM and 20mEq PM to 20mEq BID, Spironolactone from 50mg daily to 25mg BID  Additional Information:    Wife Apoorva knew medications and dosing very well and helps Damion with his medications at home    Prior to Admission medications    Medication Sig Last Dose Taking? Auth Provider Long Term End Date   bumetanide (BUMEX) 1 MG tablet 2 mg 2 times daily 1/22/2023 Yes Reported, Patient Yes    Calcium Carbonate-Vitamin D 500-3.125 MG-MCG TABS Take 1 tablet by mouth 2 times daily 1/22/2023 Yes Reported, Patient     diltiazem ER (DILT-XR) 120 MG 24 hr capsule Take 120 mg by mouth 1/22/2023 Yes Reported, Patient Yes    fidaxomicin (DIFICID) 200 MG tablet Take 1 tablet (200 mg) by mouth 2 times daily for 10 days 1/22/2023 Yes Danny Garcia MD  1/27/23   folic acid (FOLVITE) 1 MG tablet TAKE FIVE TABLETS BY MOUTH EVERY DAY 1/22/2023 Yes Reported, Patient     lactulose (CHRONULAC) 10 GM/15ML solution Take 20 g by mouth 3 times daily 1/22/2023 Yes Reported, Patient     multivitamin w/minerals (THERA-VIT-M) tablet Take 1 tablet by mouth daily 1/22/2023 Yes Reported, Patient     omeprazole 20 MG tablet Take 40 mg by mouth 2 times daily 1/22/2023 Yes Unknown, Entered By History     potassium chloride (KLOR-CON) 20 MEQ packet Take 20 mEq by mouth 2 times daily 1/22/2023 Yes Reported, Patient     predniSONE (DELTASONE) 5 MG tablet Take 10 mg by mouth 1/22/2023 Yes Reported, Patient     spironolactone  (ALDACTONE) 50 MG tablet Take 25 mg by mouth 2 times daily 1/22/2023 Yes Reported, Patient     XIFAXAN 550 MG TABS tablet Take 550 mg by mouth 2 times daily 1/22/2023 Yes Reported, Patient     zinc sulfate (ZINCATE) 220 (50 Zn) MG capsule Take 220 mg by mouth daily 1/22/2023 Yes Unknown, Entered By History     fidaxomicin (DIFICID) 200 MG tablet Take 1 tablet (200 mg) by mouth 2 times daily   Danny Garcia MD         Date completed: 01/23/23    Medication history completed by:     Mike HarkinsD, [facility]    [phone]  Pager [phone]

## 2023-01-23 NOTE — ED PROVIDER NOTES
"    [place] EMERGENCY DEPARTMENT ([facility])    1/23/23       ED PROVIDER NOTE     History     Chief Complaint   Patient presents with     Confusion     HPI  Damion Quinones is a 67 year old male with history of alcoholic cirrhosis c/b hepatic encephalopathy and esophageal varices s/p TIPS, paroxysmal atrial fibrillation, CKD, psoriatic arthritis, HTN, ANCA vasculitis, type II diabetes mellitus, recurrent C. diff who presents with ongoing diarrhea, generalized fatigue and concern for worsening kidney function.  He was recently hospitalized from 1/15-1/16/2023 after presenting to the emergency department with abdominal pain, nausea, vomiting, diarrhea, gassiness and belching.  He was seen by Dr. Cameron who performed work-up with labs, CT imaging, EKG.  He was admitted to medicine on telemetry due to high potassium.  He was found to have recurrence of C. difficile as well as rising creatinine levels, demand ischemia with elevated troponin did trend down during admission.  He was discharged home a week ago.  He feels sick and so returns to the ED for evaluation. His wife has noticed increased AMS and decreased BMs despite lactulose. Pt reports abdominal pain and feeling like his bowels are \"blocked\" but he has continued to pass a large amount of flatus per his wife. She has noticed asterixis as well.     Past Medical History  Past Medical History:   Diagnosis Date     Alcoholic cirrhosis of liver with ascites (H) 10/11/2019     Arthritis     RA     Chronic kidney disease      Coronary artery disease     AFib 2016     Diabetes mellitus type 2, uncomplicated (H) 10/11/2019     History of blood transfusion     2016     History of hemochromatosis 10/11/2019     Hypertension      Hypokalemia      Obesity      PAF (paroxysmal atrial fibrillation) (H)      Psoriatic arthritis (H)      Past Surgical History:   Procedure Laterality Date     APPENDECTOMY      Removed at 18 Years Old      COLONOSCOPY      2014 at " "[facility]      EYE SURGERY Bilateral     Cataract     GI SURGERY      TIPS     HERNIA REPAIR      History of bilateral inguinal hernia repair: 10/28/2014. Open hernia repair: 10/2017. Abdominal wound exploration and debridement 12/27/2017     bumetanide (BUMEX) 1 MG tablet  Calcium Carbonate-Vitamin D 500-3.125 MG-MCG TABS  diltiazem ER (DILT-XR) 120 MG 24 hr capsule  fidaxomicin (DIFICID) 200 MG tablet  fidaxomicin (DIFICID) 200 MG tablet  folic acid (FOLVITE) 1 MG tablet  lactulose (CHRONULAC) 10 GM/15ML solution  multivitamin w/minerals (THERA-VIT-M) tablet  pantoprazole (PROTONIX) 40 MG EC tablet  potassium chloride (KLOR-CON) 20 MEQ packet  predniSONE (DELTASONE) 5 MG tablet  spironolactone (ALDACTONE) 50 MG tablet  XIFAXAN 550 MG TABS tablet      No Known Allergies  Family History  Family History   Problem Relation Age of Onset     Lung Cancer Mother      Colon Cancer Father      Lung Cancer Brother      Heart Failure Brother      Kidney Disease Brother      Social History   Social History     Tobacco Use     Smoking status: Never     Smokeless tobacco: Never   Substance Use Topics     Alcohol use: Not Currently     Comment: Last ETOH use was 12/31/2021     Drug use: Not Currently      Past medical history, past surgical history, medications, allergies, family history, and social history were reviewed with the patient. No additional pertinent items.      A medically appropriate review of systems was performed with pertinent positives and negatives noted in the HPI, and all other systems negative.    Physical Exam   BP: (!) 143/88  Pulse: 88  Temp: 98.2  F (36.8  C)  Resp: 16  Height: 172.7 cm (5' 8\")  Weight: 95.3 kg (210 lb)  SpO2: 99 %  Physical Exam  General: Well nourished, well developed  HEENT: EOMI, anicteric. NCAT, MMM  Neck: no jugular venous distension, supple, nl ROM  Cardiac: Regular rate and rhythm. No murmurs, rubs, or gallops. Normal S1, S2.  Intact peripheral pulses  Pulm: CTAB, no " stridor, wheezes, rales, rhonchi  Abd: Soft, slight diffuse tenderness to palpation without rebound or guarding, there is a reducible umbilical hernia present, nondistended.  No masses palpated.    Skin: Warm and dry to the touch.  No rash  Extremities: No LE edema, no cyanosis, w/w/p  Neuro: Alert, disoriented to situation, moving all extremities          ED Course, Procedures, & Data      Procedures                     Results for orders placed or performed during the hospital encounter of 01/23/23   CT Head w/o Contrast     Status: None    Narrative    EXAM: CT HEAD W/O CONTRAST  LOCATION: [facility]  DATE/TIME: 1/23/2023 2:49 AM    INDICATION: Altered mental status  COMPARISON: And 24 2022  TECHNIQUE: Routine CT Head without IV contrast. Multiplanar reformats. Dose reduction techniques were used.    FINDINGS:  INTRACRANIAL CONTENTS: No intracranial hemorrhage, extraaxial collection, or mass effect.  No CT evidence of acute infarct. Mild volume loss and presumed chronic small vessel ischemia are unchanged. Encephalomalacia in the anterior inferior right frontal   lobe again noted.     VISUALIZED ORBITS/SINUSES/MASTOIDS: No intraorbital abnormality. No paranasal sinus mucosal disease. No middle ear or mastoid effusion.    BONES/SOFT TISSUES: No acute abnormality.      Impression    IMPRESSION:  1.  No acute intracranial abnormality or significant change compared to the prior study.   CT Abdomen Pelvis w/o Contrast     Status: None    Narrative    EXAM: CT ABDOMEN PELVIS W/O CONTRAST  LOCATION: [facility]  DATE/TIME: 1/23/2023 2:49 AM    INDICATION: Abdominal pain.  COMPARISON: 1/15/2023.  TECHNIQUE: CT scan of the abdomen and pelvis was performed without IV contrast. Multiplanar reformats were obtained. Dose reduction techniques were used.  CONTRAST: None.    FINDINGS:   LOWER CHEST: Mild atelectasis at the lung bases.  There are coronary artery atherosclerotic calcifications. The heart size is normal.    HEPATOBILIARY: Cirrhotic liver. TIPS. No calcified gallstone.    PANCREAS: Normal.    SPLEEN: Normal.    ADRENAL GLANDS: Normal.    KIDNEYS/BLADDER: No significant change in a small subcapsular hematoma at the posterior aspect of the left kidney. No hydronephrosis.    BOWEL: There are colonic diverticula without acute diverticulitis. There is a paraumbilical hernia containing small bowel loops but not causing obstruction. There are metallic clips in the stomach and vascular coils posterior to the gastric fundus. There   is no free intraperitoneal gas or fluid.    LYMPH NODES: Normal.    VASCULATURE: Atherosclerotic calcification of the aorta and its branches. No aneurysm.    PELVIC ORGANS: Normal.    MUSCULOSKELETAL: Degenerative disease throughout the spine.      Impression    IMPRESSION:   1.  No acute abnormality.  2.  Paraumbilical hernia containing small bowel loops without obstruction.  3.  Cirrhotic liver with TIPS.  4.  No significant change in subcapsular hematoma at the posterior aspect of the left kidney.     Comprehensive metabolic panel     Status: Abnormal   Result Value Ref Range    Sodium 136 136 - 145 mmol/L    Potassium 5.1 3.4 - 5.3 mmol/L    Chloride 99 98 - 107 mmol/L    Carbon Dioxide (CO2) 17 (L) 22 - 29 mmol/L    Anion Gap 20 (H) 7 - 15 mmol/L    Urea Nitrogen 93.8 (H) 8.0 - 23.0 mg/dL    Creatinine 6.28 (H) 0.67 - 1.17 mg/dL    Calcium 9.4 8.8 - 10.2 mg/dL    Glucose 124 (H) 70 - 99 mg/dL    Alkaline Phosphatase 246 (H) 40 - 129 U/L    AST 58 (H) 10 - 50 U/L    ALT 64 (H) 10 - 50 U/L    Protein Total 5.7 (L) 6.4 - 8.3 g/dL    Albumin 3.2 (L) 3.5 - 5.2 g/dL    Bilirubin Total 0.9 <=1.2 mg/dL    GFR Estimate 9 (L) >60 mL/min/1.73m2   Lipase     Status: Abnormal   Result Value Ref Range    Lipase 149 (H) 13 - 60 U/L   Ammonia     Status: Abnormal   Result Value Ref Range    Ammonia 186 (HH) 16 - 60 umol/L    [facility] Draw     Status: None    Narrative    The following orders were created for panel order [place] Draw.  Procedure                               Abnormality         Status                     ---------                               -----------         ------                     Extra Blue Top Tube[[id number]]                              Final result               Extra Green Top (Lithium...[[id number]]                      Final result                 Please view results for these tests on the individual orders.   CBC with platelets and differential     Status: Abnormal   Result Value Ref Range    WBC Count 16.1 (H) 4.0 - 11.0 10e3/uL    RBC Count 4.53 4.40 - 5.90 10e6/uL    Hemoglobin 13.1 (L) 13.3 - 17.7 g/dL    Hematocrit 41.1 40.0 - 53.0 %    MCV 91 78 - 100 fL    MCH 28.9 26.5 - 33.0 pg    MCHC 31.9 31.5 - 36.5 g/dL    RDW 14.7 10.0 - 15.0 %    Platelet Count 123 (L) 150 - 450 10e3/uL    % Neutrophils 76 %    % Lymphocytes 9 %    % Monocytes 10 %    % Eosinophils 1 %    % Basophils 0 %    % Immature Granulocytes 4 %    NRBCs per 100 WBC 0 <1 /100    Absolute Neutrophils 12.2 (H) 1.6 - 8.3 10e3/uL    Absolute Lymphocytes 1.4 0.8 - 5.3 10e3/uL    Absolute Monocytes 1.6 (H) 0.0 - 1.3 10e3/uL    Absolute Eosinophils 0.2 0.0 - 0.7 10e3/uL    Absolute Basophils 0.1 0.0 - 0.2 10e3/uL    Absolute Immature Granulocytes 0.6 (H) <=0.4 10e3/uL    Absolute NRBCs 0.0 10e3/uL   Extra Blue Top Tube     Status: None   Result Value Ref Range    Hold Specimen JIC    Extra Green Top (Lithium Heparin) ON ICE     Status: None   Result Value Ref Range    Hold Specimen JIC    CBC with platelets differential     Status: Abnormal    Narrative    The following orders were created for panel order CBC with platelets differential.  Procedure                               Abnormality         Status                     ---------                               -----------         ------                     CBC with platelets and  d...[[id number]]  Abnormal            Final result                 Please view results for these tests on the individual orders.     Medications   lactulose (CHRONULAC) solution 20 g (has no administration in time range)     Labs Ordered and Resulted from Time of ED Arrival to Time of ED Departure   COMPREHENSIVE METABOLIC PANEL - Abnormal       Result Value    Sodium 136      Potassium 5.1      Chloride 99      Carbon Dioxide (CO2) 17 (*)     Anion Gap 20 (*)     Urea Nitrogen 93.8 (*)     Creatinine 6.28 (*)     Calcium 9.4      Glucose 124 (*)     Alkaline Phosphatase 246 (*)     AST 58 (*)     ALT 64 (*)     Protein Total 5.7 (*)     Albumin 3.2 (*)     Bilirubin Total 0.9      GFR Estimate 9 (*)    LIPASE - Abnormal    Lipase 149 (*)    AMMONIA - Abnormal    Ammonia 186 (*)    CBC WITH PLATELETS AND DIFFERENTIAL - Abnormal    WBC Count 16.1 (*)     RBC Count 4.53      Hemoglobin 13.1 (*)     Hematocrit 41.1      MCV 91      MCH 28.9      MCHC 31.9      RDW 14.7      Platelet Count 123 (*)     % Neutrophils 76      % Lymphocytes 9      % Monocytes 10      % Eosinophils 1      % Basophils 0      % Immature Granulocytes 4      NRBCs per 100 WBC 0      Absolute Neutrophils 12.2 (*)     Absolute Lymphocytes 1.4      Absolute Monocytes 1.6 (*)     Absolute Eosinophils 0.2      Absolute Basophils 0.1      Absolute Immature Granulocytes 0.6 (*)     Absolute NRBCs 0.0     ROUTINE UA WITH MICROSCOPIC REFLEX TO CULTURE     CT Abdomen Pelvis w/o Contrast   Final Result   IMPRESSION:    1.  No acute abnormality.   2.  Paraumbilical hernia containing small bowel loops without obstruction.   3.  Cirrhotic liver with TIPS.   4.  No significant change in subcapsular hematoma at the posterior aspect of the left kidney.         CT Head w/o Contrast   Final Result   IMPRESSION:   1.  No acute intracranial abnormality or significant change compared to the prior study.             Medical Decision Making  The patient presented with a  problem that is a chronic illness severe exacerbation, progression, or side effect of treatment.    The patient's evaluation involved:  an assessment requiring an independent historian (pt's wife)  review of 3+ prior external note(s) (see separate area of note for details)  ordering and review of 3+ test(s) (see separate area of note for details)  review of 3+ test result(s) ordered prior to this encounter (see separate area of note for details)  independent interpretation of testing performed by another health professional (CTs)  discussion of management or test interpretation with another health professional (see separate area of note for details)    The patient's management involved a decision regarding hospitalization.      Assessment & Plan    The patient is a 67-year-old male who presents to the emergency department with altered mental status in the setting of known liver disease.  Differential diagnosis includes hepatic encephalopathy, underlying infectious process (UTI, pneumonia, patient's known C. difficile infection, etc.), hypoglycemia, electrolyte abnormality, other metabolic abnormality, small bowel obstruction.  Labs, urinalysis, head CT, abdominal CT ordered to further evaluate the patient.    Laboratory work-up is remarkable for significant ammonia elevation, lactulose ordered.    Head CT shows no acute intracranial abnormality, unchanged compared to prior.    Abdominal CT shows umbilical hernia with small bowel loops without evidence for obstruction, cirrhotic liver with TIPS, unchanged subcapsular hematoma of the left kidney, no acute abnormalities.    I have reviewed the nursing notes. I have reviewed the findings, diagnosis, plan and need for follow up with the patient.    Patient to be admitted to internal medicine service for further management. Plan was discussed with patient who understands and agrees with plan.       New Prescriptions    No medications on file       Final diagnoses:    Altered mental status, unspecified altered mental status type   Hepatic encephalopathy     Jalyn Burnett MD  [facility] EMERGENCY DEPARTMENT  1/23/2023     Jalyn Burnett MD  01/23/23 0333

## 2023-01-23 NOTE — PROGRESS NOTES
Patient waiting for admission, currently in evaluation for [facility] transplant.    1) Remaining evaluation: was scheduled for cardiac MRI next week as part of cardiac evaluation (see note from Dr. Bennett 12/27) Normal Lexiscan on 1/12/23 (unable to do DSE given patient's AFib.)    2) Was to have outpatient sleep study, do not see record of this being completed in [facility], patient has had several hospitalizations since initial evaluation began.      3) Ongoing frailty assessment given age and recent admissions, patient in eval for SLK.

## 2023-01-23 NOTE — PROGRESS NOTES
Resident/Fellow Attestation   I, Polo Esteban MD, was present with the medical/GEORGE student who participated in the service and in the documentation of the note.  I have verified the history and personally performed the physical exam and medical decision making.  I agree with the assessment and plan of care as documented in the note.      Polo Esteban MD  PGY1  Date of Service (when I saw the patient): 01/23/23    [facility]    Progress Note - Medicine Service, [facility] TEAM 2       Date of Admission:  1/23/2023    Assessment & Plan   Damion Quinones is a 67 year old male admitted on 1/23/2023. He has a PMH significant for EtOH associated cirrhosis c/b hepatic encephalopathy and esophageal varices s/p TIPS, paroxysmal atrial fibrillation, CKD, psoriatic arthritis, HTN, ANCA vasculitis, T2DM2, recent admission for c diff 1/15-1/16/23, now admitted for acute encephalopathy likely 2/2 hepatic encephalopathy.     Changes today:  - DVT ultrasound positive - started high intensity heparin drip  - Hepatology consult - adding miralax and rifaximin    Decompensated cirrhosis  Acute toxic metabolic encephalopathy, likely 2/2 hepatic encephalopathy  Cirrhosis 2/2 EtOH use c/b hepatic encephalopathy, ascites, and varices s/p TIPS and variceal banding. Liver transplant evaluation is underway. Ammonia on admit 186; acute hepatic encephalopathy likely explanation for his presenting altered mental status. Precipitating cause for this is possibly infection (such as known c diff) vs poor clearance (BMs in general have been slowing down). No e/o GI bleeding currently. Additional encephalopathy workup includes CT Head (no acute abnormality).  No other major metabolic disturbances beyond baseline to explain his AMS. No hypoxia. Given clinical stability, can hold empiric SBP treatment. Cautious with empiric abx in this patient unless obvious infection per earlier ID guidance (ex. avoid  3rd/4th gen cephalosporins and clindamycin whenever possible due to recurrent c diff). 1/23 UA with some pyuria but no symptoms, Bcx pending.  - Hepatology consult, appreciate recs    > peth pending  - Resume PTA lactulose, titrate to 2-3 stools per day.  - Resume PTA rifaximin  - Begin miralax  - Infectious workup: UA, blood cultures  - TIPS US  - Continue to treat c diff (see below)  - No additional empiric abx unless infectious workup is otherwise revealing  - Neuro checks and fall precautions  - Daily MELD labs     Right lower extremity DVT  Presented with right greater than left lower extremity swelling, present for 1-2 days prior to admission, progressively worsening. RLE appears about twice the size of LLE. Doppler US 1/23 showing DVT in right leg.   - start heparin infusion    Recurrent c diff infection  Admitted recently for this. Hx of recurrence, at last admission was discharged on 10 day course of fidaxomicin per ID reccs. Still taking this. Per ID, if requiring other abx would need ppx with oral vancomycin 125mg BID through course.  - Continue fidaximicin              - If requiring other abx, may need oral vanco ppx as well     PATI on CKD 4 2/2 IgA vasculitis vs cirrhosis  Elevated Cr  Cr elevated above baseline of 5.3, meets PATI criteria. Likely is due to hypovolemia in setting of his c diff infection and poor PO intake since last admission. Also need to consider HRS. If not improving, would consider albumin challenge and consult nephrology.  - Given GI losses, minimal intake, will cautiously hydrate (1L LR slowly)  - I/Os  - Avoid nephrotoxins  - Holding PTA diuretics given c/f dehydration  - Trend Cr  - Albumin challenge and nephrology consult if persistent/worsening Cr     Anion gap metabolic acidosis  Anion gap of 19 on presentation, in setting of CKD and uremia.  - treatment of hepatic encephalopathy, as above     Paroxysmal afib  Not anticoagulated outpatient.   - Continue PTA  "diltiazem     Psoriatic arthritis  On tapered steroid course, currently taking 10mg matt of prednisone  - continue pred taper  - consider PJP ppx if prolonged pred therapy       Diet: Combination Diet Regular Diet Adult    DVT Prophylaxis: on heparin infusion for DVT  Lux Catheter: Not present  Fluids: 1 L LR at mIVF  Lines: None     Cardiac Monitoring: None  Code Status: Full Code      Clinically Significant Risk Factors Present on Admission             # Anion Gap Metabolic Acidosis: Highest Anion Gap = 20 mmol/L in last 2 days, will monitor and treat as appropriate  # Hypoalbuminemia: Lowest albumin = 3.2 g/dL at 1/23/2023  5:50 AM, will monitor as appropriate  # Coagulation Defect: INR = 1.23 (Ref range: 0.85 - 1.15) and/or PTT = 27 Seconds (Ref range: 22 - 38 Seconds), will monitor for bleeding  # Thrombocytopenia: Lowest platelets = 106 in last 2 days, will monitor for bleeding  # Acute Kidney Injury, unspecified: based on a >150% or 0.3 mg/dL increase in last creatinine compared to past 90 day average, will monitor renal function       # Obesity: Estimated body mass index is 31.93 kg/m  as calculated from the following:    Height as of this encounter: 1.727 m (5' 8\").    Weight as of this encounter: 95.3 kg (210 lb).           Disposition Plan      Expected Discharge Date: 01/27/2023                The patient's care was discussed with the Attending Physician, Dr. Ventura.    Rhianna Arriaga, MS3  Medical Student  Medicine Service, [address]  Securely message with [employer] (more info)  Text page via [facility] Paging/Directory   See signed in provider for up to date coverage information  ______________________________________________________________________    Interval History   Nursing notes reviewed. Patient came to ED early this AM. Patient oriented to self and hospital, but not year. Shakes head yes/no, sometimes answers questions verbally. Wife at " "bedside. Wife notes that patient has had a poor appetite since last hospital discharge. Patient notes a \"tiny\" taste in his mouth. Has been having 4-5 stools every day up until yesterday, when he only had 1 or 2. Wife also noticed patient started to get confused yesterday, and was \"in and out of it\". Notes he has been passing lots of flatus. Also notes lower extremity swelling, right > left. No chest pain, shortness of breath, fever. Endorses some nausea, and did have an episode of emesis this morning. No dysuria. Abdominal pain associated with gas per patient.    Physical Exam   Vital Signs: Temp: 97.8  F (36.6  C) Temp src: Oral BP: (!) 168/117 Pulse: 85   Resp: 16 SpO2: 100 % O2 Device: None (Room air)    Weight: 210 lbs 0 oz     General: Resting in bed, awakens to exam and is answering some questions, seems confused by others, is calm, non-toxic appearing, NAD, wife at bedside  HEENT: NC/AT, EOMI, anicteric sclerae, MMM  Neck: Supple  CV: RRR, no murmurs  Respiratory: CTAB, breathing is non-labored  Abdomen: Distended, soft, there is mild tenderness to palpation throughout/not focal, no rebound or guarding, known umbilical hernia present/reducible, no other masses, hyperactive bowel sounds present  Ext: WWP, non-pitting edema present as RLE appears > LLE, no redness, no pain to palpation of extremity  Skin: Warm, dry, no rashes  Neuro: Disoriented at times to situation, moves all extremities equally, asterixis is present    Medical Decision Making       Please see A&P for additional details of medical decision making.      Data   ------------------------- PAST 24 HR DATA REVIEWED -----------------------------------------------    I have personally reviewed the following data over the past 24 hrs:    15.3 (H)  \   12.8 (L)   / 106 (L)     137 101 94.1 (H) /  109 (H)   4.8 17 (L) 6.35 (H) \       ALT: 56 (H) AST: 51 (H) AP: 220 (H) TBILI: 0.9   ALB: 3.2 (L) TOT PROTEIN: 5.7 (L) LIPASE: 149 (H)       INR:  1.23 (H) " PTT:  N/A   D-dimer:  N/A Fibrinogen:  N/A       Imaging results reviewed over the past 24 hrs:   Recent Results (from the past 24 hour(s))   CT Head w/o Contrast    Narrative    EXAM: CT HEAD W/O CONTRAST  LOCATION: [facility]  DATE/TIME: 1/23/2023 2:49 AM    INDICATION: Altered mental status  COMPARISON: And 24 2022  TECHNIQUE: Routine CT Head without IV contrast. Multiplanar reformats. Dose reduction techniques were used.    FINDINGS:  INTRACRANIAL CONTENTS: No intracranial hemorrhage, extraaxial collection, or mass effect.  No CT evidence of acute infarct. Mild volume loss and presumed chronic small vessel ischemia are unchanged. Encephalomalacia in the anterior inferior right frontal   lobe again noted.     VISUALIZED ORBITS/SINUSES/MASTOIDS: No intraorbital abnormality. No paranasal sinus mucosal disease. No middle ear or mastoid effusion.    BONES/SOFT TISSUES: No acute abnormality.      Impression    IMPRESSION:  1.  No acute intracranial abnormality or significant change compared to the prior study.   CT Abdomen Pelvis w/o Contrast    Narrative    EXAM: CT ABDOMEN PELVIS W/O CONTRAST  LOCATION: [facility]  DATE/TIME: 1/23/2023 2:49 AM    INDICATION: Abdominal pain.  COMPARISON: 1/15/2023.  TECHNIQUE: CT scan of the abdomen and pelvis was performed without IV contrast. Multiplanar reformats were obtained. Dose reduction techniques were used.  CONTRAST: None.    FINDINGS:   LOWER CHEST: Mild atelectasis at the lung bases. There are coronary artery atherosclerotic calcifications. The heart size is normal.    HEPATOBILIARY: Cirrhotic liver. TIPS. No calcified gallstone.    PANCREAS: Normal.    SPLEEN: Normal.    ADRENAL GLANDS: Normal.    KIDNEYS/BLADDER: No significant change in a small subcapsular hematoma at the posterior aspect of the left kidney. No hydronephrosis.    BOWEL: There are colonic diverticula without  acute diverticulitis. There is a paraumbilical hernia containing small bowel loops but not causing obstruction. There are metallic clips in the stomach and vascular coils posterior to the gastric fundus. There   is no free intraperitoneal gas or fluid.    LYMPH NODES: Normal.    VASCULATURE: Atherosclerotic calcification of the aorta and its branches. No aneurysm.    PELVIC ORGANS: Normal.    MUSCULOSKELETAL: Degenerative disease throughout the spine.      Impression    IMPRESSION:   1.  No acute abnormality.  2.  Paraumbilical hernia containing small bowel loops without obstruction.  3.  Cirrhotic liver with TIPS.  4.  No significant change in subcapsular hematoma at the posterior aspect of the left kidney.     US Lower Extremity Venous Duplex Right   Result Value    Radiologist flags (Urgent)    Narrative    ULTRASOUND LOWER EXTREMITY VENOUS DUPLEX RIGHT 1/23/2023 3:17 PM    CLINICAL HISTORY: swelling, pain, r/o DVT.     COMPARISONS: None available.    REFERRING PROVIDER: JAMIE FRIED    TECHNIQUE: Grayscale, color Doppler, Doppler waveform ultrasound  evaluation was performed through the right common femoral, femoral,  and popliteal veins. Right posterior tibial and peroneal veins were  evaluated with grayscale imaging and compression.    Left common femoral vein was evaluated for symmetry.    FINDINGS: Left common femoral vein is patent, fully compressible, and  demonstrates normal phasic Doppler waveform.    Right common femoral vein is fully compressible with a phasic  waveform.    Right femoral vein is fully compressible with a phasic waveform in the  mid thigh.    Right femoral vein in the lower thigh is partially compressible with a  phasic waveform.    Right popliteal vein is occluded and non compressible    Right posterior tibial veins are fully compressible to the ankle.    Right peroneal veins are non compressible in the mid calf.      Impression    IMPRESSION:   1. Deep venous thrombosis in the right  "leg.        A. Non occlusive in the femoral vein in the lower thigh.       B. Occlusive in the popliteal vein.       C. Occlusive in the peroneal veins.    Reference: \"Duplex Ultrasound in the Diagnosis of Lower-Extremity Deep  Venous Thrombosis\"- Deena Garza MD, S; Ciro Mauriec MD  (Circulation. 2014;129:917-921. http://circ.ahajournals.org)    [Access Center:   1. Deep venous thrombosis in the right leg.        A. Non occlusive in the femoral vein in the lower thigh.       B. Occlusive in the popliteal vein.       C. Occlusive in the peroneal veins.  ]    This report will be copied to the [facility] to ensure a  provider acknowledges the finding. Access Center is available Monday  through Friday 8am-3:30 pm.     JACIEL AU MD         SYSTEM ID:  [id number]     "

## 2023-01-23 NOTE — CONSULTS
Hepatology Consultation    Damion Quinones   MRN# [id number]     Age: 67 year old YOB: 1955       Referring provider: Que Vogel  Attending Hepatologist: Dr. Poole  Consult requested for: HE       Assessment and Recommendation:   Assessment:   Mr. Quinones is a 67-year-old with a history of alcohol/homozygous H63D cirrhosis complicated by EV/IGV s/p TIPS (10/1/22), HE, CKD, HTN, chronic a-fib, psoriatic arthritis, c-diff (most recent 1/15) who was admitted with decreased stool output and worsening mentation.       Recommendations:   # Hepatic encephaloapthy  - continue rifaximin, lactulose. Having significant gaseous distention without stool despite being on lactulose. Adding miralax for bowel stimulation.   - Infectious work up with BC, UA negative. No ascites for para.     # Right leg edema  - R>L edema. Agree with US venous of right leg.     # Alcohol/ homozygous H63D  # CKD  - MELD 22. Currently in liver transplant eval. He still needs to complete cardiac evaluation with cardiac MRI. Has hx of chronic a-fib. He also needs a sleep study as OP.   - US abd 11/2022 without HCC.   - peth pending.   - creatinine remains elevated and has biopsy with IgA and ANCA disease. Would need an SLK for transplant if a candidate. Is not currently on HD and has been making urine. Nephrology consult.     #EV  # S/p TIPS  - no concern for bleeding. No ascites seen on imaging.   - US TIPS in progress.     # Debility  - continue PT/OT    Plan of care discussed with Dr. Poole    Thank you for the opportunity to be involved in Damion Quinones care. Please call with any questions or concerns.     Ariane Hussein, APRN, CNP  Inpatient Hepatology [facility]  Text link               History of Present Illness:   Damion Quinones is a 67 year old male with a history of alcohol cirrhosis, abstinent since 1/2020. His liver disease has been complicated by H63D homozygous, EV/IGV s.p bleeding with TIPS (10/1/22), HE, CKD (IgA/ANCA), HTN,  chronic a-fib, psoriatic arthritis, c-diff (12/2022, 1/15/23) who was admitted with worsening mentation and decreased stool output. His wife notes that his mentation has been poor since his last diagnosis of c-diff. At home, his stool output decreased although he was continuing to have significant flatus. He normally has 4-5 BM's per day which had decreased to 2x daily soft BM's even with an increase in lactulose.     He denied any abdominal distention, abdominal pain, fevers, melena or hematochezia. Per his wife, his right leg had also increased in size which is new for him. He had told her he had some right leg pain. He has been mobile around his house but has been slow, especially now with the edema. He has had a decrease in oral intake- foods and fluids following treatment of his c-diff.               Past Medical History:     Past Medical History:   Diagnosis Date     Alcoholic cirrhosis of liver with ascites (H) 10/11/2019     Arthritis     RA     Chronic kidney disease      Coronary artery disease     AFib 2016     Diabetes mellitus type 2, uncomplicated (H) 10/11/2019     History of blood transfusion     2016     History of hemochromatosis 10/11/2019     Hypertension      Hypokalemia      Obesity      PAF (paroxysmal atrial fibrillation) (H)      Psoriatic arthritis (H)               Past Surgical History:     Past Surgical History:   Procedure Laterality Date     APPENDECTOMY      Removed at 18 Years Old      COLONOSCOPY      2014 at [facility].      EYE SURGERY Bilateral     Cataract     GI SURGERY      TIPS     HERNIA REPAIR      History of bilateral inguinal hernia repair: 10/28/2014. Open hernia repair: 10/2017. Abdominal wound exploration and debridement 12/27/2017              Social History:     Social History     Tobacco Use     Smoking status: Never     Smokeless tobacco: Never   Substance Use Topics     Alcohol use: Not Currently     Comment: Last ETOH use was 12/31/2021             Family  "History:   The I have reviewed this patient's family history and updated it with pertinent information if needed.  Family History   Problem Relation Age of Onset     Lung Cancer Mother      Colon Cancer Father      Lung Cancer Brother      Heart Failure Brother      Kidney Disease Brother                   Immunizations:     There is no immunization history on file for this patient.         Allergies:   No Known Allergies          Medications:     Current Facility-Administered Medications   Medication     diltiazem ER COATED BEADS (CARDIZEM CD/CARTIA XT) 24 hr capsule 120 mg     fidaxomicin (DIFICID) tablet 200 mg     folic acid (FOLVITE) tablet 5 mg     lactulose (CHRONULAC) solution 20 g     lidocaine (LMX4) cream     lidocaine 1 % 0.1-1 mL     melatonin tablet 1 mg     multivitamin w/minerals (THERA-VIT-M) tablet 1 tablet     ondansetron (ZOFRAN) injection 4 mg     pantoprazole (PROTONIX) EC tablet 20 mg     polyethylene glycol (MIRALAX) Packet 17 g     predniSONE (DELTASONE) tablet 10 mg     rifaximin (XIFAXAN) tablet 550 mg     sodium chloride (PF) 0.9% PF flush 3 mL     sodium chloride (PF) 0.9% PF flush 3 mL     Current Outpatient Medications   Medication     bumetanide (BUMEX) 1 MG tablet     Calcium Carbonate-Vitamin D 500-3.125 MG-MCG TABS     diltiazem ER (DILT-XR) 120 MG 24 hr capsule     fidaxomicin (DIFICID) 200 MG tablet     fidaxomicin (DIFICID) 200 MG tablet     folic acid (FOLVITE) 1 MG tablet     lactulose (CHRONULAC) 10 GM/15ML solution     multivitamin w/minerals (THERA-VIT-M) tablet     pantoprazole (PROTONIX) 40 MG EC tablet     potassium chloride (KLOR-CON) 20 MEQ packet     predniSONE (DELTASONE) 5 MG tablet     spironolactone (ALDACTONE) 50 MG tablet     XIFAXAN 550 MG TABS tablet               Physical Exam:   Blood pressure (!) 168/117, pulse 85, temperature (P) 97.8  F (36.6  C), temperature source (P) Oral, resp. rate 16, height 1.727 m (5' 8\"), weight 95.3 kg (210 lb), SpO2 100 %. Body " mass index is 31.93 kg/m .    General: In no acute distress, mild facial muscle wasting  Neuro: drowsy Ox1-2, moderate asterixis  HEENT: PERRL mild scleral icterus, Nooral lesions  CV: S1/S2 with gr 2/6 murmurs, irregular Skin warm and dry  Lungs: clear to auscultation, diminished bases Respirations even and nonlabored on room air  Abd: Nondistended, nontender  Extrem: +2 Right, +1 left peripehral edema  Skin: Nojaundice  Psych: flat         Data:     Lab Results   Component Value Date    WBC 15.3 01/23/2023     Lab Results   Component Value Date    RBC 4.37 01/23/2023     Lab Results   Component Value Date    HGB 12.8 01/23/2023     Lab Results   Component Value Date    HCT 39.2 01/23/2023     No components found for: MCT  Lab Results   Component Value Date    MCV 90 01/23/2023     Lab Results   Component Value Date    MCH 29.3 01/23/2023     Lab Results   Component Value Date    MCHC 32.7 01/23/2023     Lab Results   Component Value Date    RDW 14.7 01/23/2023     Lab Results   Component Value Date     01/23/2023       Last Basic Metabolic Panel:  Lab Results   Component Value Date     01/23/2023      Lab Results   Component Value Date    POTASSIUM 4.8 01/23/2023    POTASSIUM 4.6 08/27/2019     Lab Results   Component Value Date    CHLORIDE 101 01/23/2023     Lab Results   Component Value Date    NAZARIO 9.3 01/23/2023     Lab Results   Component Value Date    CO2 17 01/23/2023     Lab Results   Component Value Date    BUN 94.1 01/23/2023     Lab Results   Component Value Date    CR 6.35 01/23/2023    CR 1.11 08/27/2019     Lab Results   Component Value Date     01/23/2023     08/27/2019       Liver Function Studies -   Recent Labs   Lab Test 01/23/23  0550   PROTTOTAL 5.7*   ALBUMIN 3.2*   BILITOTAL 0.9   ALKPHOS 220*   AST 51*   ALT 56*       Lab Results   Component Value Date    INR 1.23 01/23/2023       MELD-Na score: 22 at 1/23/2023  5:50 AM  MELD score: 22 at 1/23/2023  5:50  AM  Calculated from:  Serum Creatinine: 6.35 mg/dL (Using max of 4 mg/dL) at 1/23/2023  5:50 AM  Serum Sodium: 137 mmol/L at 1/23/2023  5:50 AM  Total Bilirubin: 0.9 mg/dL (Using min of 1 mg/dL) at 1/23/2023  5:50 AM  INR(ratio): 1.23 at 1/23/2023  5:50 AM  Age: 67 years           Previous Endoscopy:     EGD 9/24/22  Impression:            - Small (< 5 mm) esophageal varices. Flattens very                          well with insufflation                          - Type 1 isolated gastric varices (IGV1, varices                          located in the fundus), oozing blood, immediately                          present upon entering GE junction                          - Type 2 isolated gastric varices (IGV2, varices                          located in the body, antrum or around the pylorus),                          oozing blood.                          - Red blood in the gastric body.                          - Portal hypertensive gastropathy.                          - Normal examined duodenum.                          - No specimens collected.     Colonoscopy 7/31/20  Impression:          - Four 2 to 3 mm polyps in the descending colon, in                        the transverse colon and in the ascending colon,                        removed with a jumbo cold forceps. Resected and                        retrieved.                        - One 8 mm polyp in the descending colon, removed                        with a hot snare. Resected and retrieved. Clip (MR                        conditional) was placed.       IMAGING:  Echo 12/19/22  Interpretation Summary  The patient's rhythm is atrial fibrillation.  Global and regional left ventricular function is normal with an EF of 55-60%.  Global right ventricular function is normal.  IVC diameter and respiratory changes fall into an intermediate range  suggesting an RA pressure of 8 mmHg.  No pericardial effusion is present.  There has been no change.    Lexiscan  1/12/22     The nuclear stress test is negative for inducible myocardial ischemia or infarction.     Left ventricular function is normal.    CT abd wo 1/23/23  IMPRESSION:   1.  No acute abnormality.  2.  Paraumbilical hernia containing small bowel loops without obstruction.  3.  Cirrhotic liver with TIPS.  4.  No significant change in subcapsular hematoma at the posterior aspect of the left kidney.    US venous Right leg 1/23/23  IMPRESSION:   1. Deep venous thrombosis in the right leg.        A. Non occlusive in the femoral vein in the lower thigh.       B. Occlusive in the popliteal vein.       C. Occlusive in the peroneal veins.

## 2023-01-23 NOTE — ED TRIAGE NOTES
Patient presents to ED with wife for increased confusion, abdominal pain, and weakness. Patient's wife satmelisa patient has been taking lactulose today without improvement in mentation.     Triage Assessment     Row Name 01/23/23 0141       Triage Assessment (Adult)    Airway WDL WDL       Respiratory WDL    Respiratory WDL WDL       Cardiac WDL    Cardiac WDL WDL       Peripheral/Neurovascular WDL    Peripheral Neurovascular WDL WDL       Cognitive/Neuro/Behavioral WDL    Cognitive/Neuro/Behavioral WDL X;level of consciousness;orientation;speech    Level of Consciousness confused    Arousal Level opens eyes spontaneously    Orientation disoriented to;place;time;situation    Speech spontaneous       [place] Coma Scale    Best Eye Response 4-->(E4) spontaneous    Best Motor Response 6-->(M6) obeys commands    Best Verbal Response 4-->(V4) confused    [place] Coma Scale Score 14    Row Name 01/23/23 0140       [place] Coma Scale    Best Eye Response --    Best Motor Response --    Best Verbal Response --    [place] Coma Scale Score --

## 2023-01-23 NOTE — H&P
[facility]    History and Physical - Medicine Service, MAROON TEAM  [employer]       Date of Admission:  1/23/2023    Assessment & Plan    aDmion Quinones is a 67 year old male admitted on 1/23/2023. He has a PMH significant for EtOH associated cirrhosis c/b hepatic encephalopathy and esophageal varices s/p TIPS, paroxysmal atrial fibrillation, CKD, psoriatic arthritis, HTN, ANCA vasculitis, T2DM2, recent admission for c diff 1/15-1/16/23, now being admitted for acute encephalopathy likely 2/2 hepatic encephalopathy.    #Decompensated cirrhosis  #Acute toxic metabolic encephalopathy, likely 2/2 hepatic encephalopathy  Cirrhosis 2/2 EtOH use c/b hepatic encephalopathy, ascites, and varices s/p TIPS and variceal banding. Liver transplant evaluation is underway. Ammonia on admit here is in 186; acute hepatic encephalopathy likely explanation for his presenting altered mental status. Precipitating cause for this is possibly infection (such as known c diff) vs poor clearance (had held lactulose, BMs in general have been slowing down). No e/o GI bleeding currently. Additional encephalopathy workup includes CT Head (no acute abnormality). Other infectious workup (such as urine and blood cultures) ordered and pending. No other major metabolic disturbances (Na, Ca, etc.) beyond baseline to explain his AMS. No hypoxia. PE on ddx for acute encephalopathy, and he does have asymmetric LE swelling (R>L); no chest pain or hypoxia, though if DVT US positive (ordered) would obtain CT PE study.  Given clinical stability, can hold empiric SBP treatment until abdominal US completed, check TIPS, if ascites present would want diagnostic para. Cautious with empiric abx in this patient unless obvious infection per earlier ID guidance (ex. avoid 3rd/4th gen cephalosporins and clindamycin whenever possible due to recurrent c diff).  - Hepatology consult, appreciate reccs  - Resume PTA  lactulose, titrate to 2-3 stools per day.  - Infectious workup: UA to be drawn, obtain blood cultures  - TIPS US  - Continue to treat c diff (see below)  - No additional empiric abx unless infectious workup is otherwise revealing  - Neuro checks and fall precautions  - Daily MELD labs  MELD-Na score: 24 at 1/16/2023  5:26 AM  MELD score: 21 at 1/16/2023  5:26 AM  Calculated from:  Serum Creatinine: 6.38 mg/dL (Using max of 4 mg/dL) at 1/16/2023  5:26 AM  Serum Sodium: 133 mmol/L at 1/16/2023  5:26 AM  Total Bilirubin: 0.9 mg/dL (Using min of 1 mg/dL) at 1/16/2023  5:26 AM  INR(ratio): 1.14 at 1/15/2023 11:07 AM  Age: 67 years    #Recurrent c diff infection  Admitted recently for this. Hx of recurrence, at last admission was discharged on 10 day course of fidaxomicin per ID reccs. Still taking this. Per ID, if requiring other abx would need ppx with oral vancomycin 125mg BID through course.  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 Likely is due to hypovolemia in setting of his c diff infection and poor PO intake since last admission. Also need to consider HRS. If not improving, would consider albumin challenge and consult nephrology.  - Given GI losses, minimal intake, will cautiously hydrate (1L LR slowly)  - I/Os  - Avoid nephrotoxins  - Holding PTA diuretics given c/f dehydration  - Trend Cr  - Albumin challenge and nephrology consult if persistent/worsening Cr    #Anion gap metabolic acidosis  2/2 GI losses/poor intake most likely.  - IVF, as above    #Paroxysmal afib  Not anticoagulated outpatient.   - Continue PTA diltiazem    #Psoriatic arthritis  On tapered steroid course, currently taking 10mg matt of prednisone  - continue pred taper  - consider PJP ppx if prolonged pred therapy     Diet: Combination Diet Regular Diet  "Adult  DVT Prophylaxis: Low Risk/Ambulatory with no VTE prophylaxis indicated  Lux Catheter: Not present  Fluids: 1L LR at mIVF  Lines: None     Cardiac Monitoring: None  Code Status: Full Code    Clinically Significant Risk Factors Present on Admission             # Anion Gap Metabolic Acidosis: Highest Anion Gap = 20 mmol/L in last 2 days, will monitor and treat as appropriate  # Hypoalbuminemia: Lowest albumin = 3.2 g/dL at 1/23/2023  1:53 AM, will monitor as appropriate   # Thrombocytopenia: Lowest platelets = 123 in last 2 days, will monitor for bleeding  # Acute Kidney Injury, unspecified: based on a >150% or 0.3 mg/dL increase in last creatinine compared to past 90 day average, will monitor renal function       # Obesity: Estimated body mass index is 31.93 kg/m  as calculated from the following:    Height as of this encounter: 1.727 m (5' 8\").    Weight as of this encounter: 95.3 kg (210 lb).           Disposition Plan Admit to inpatient     Expected Discharge Date: 01/27/2023                The patient's care was discussed with the overnight attending hospitalist, formal staffing to occur in the AM.    Gallo Bach MD  Medicine-Pediatrics, PGY4  [facility]  ______________________________________________________________________    Chief Complaint   Confusion    History is obtained from the patient    History of Present Illness   Damion Quinones is a 67 year old male with PMH significant for EtOH associated cirrhosis c/b hepatic encephalopathy and esophageal varices s/p TIPS, paroxysmal atrial fibrillation, CKD, psoriatic arthritis, HTN, ANCA vasculitis, T2DM2, recent admission for c diff 1/15-1/16/23, presenting here with confusion. Much of history is provided by patient's wife, who is present. Since discharge he has been having abdominal discomfort and poor appetite. Although initially he was having persistent loose stools, in the last couple of days he has had decreased bowel movements. " Yesterday throughout the day his wife noticed him becoming progressively more confused, so they reinitiated lactulose. Despite this, his confusion worsened, prompting this hospital visit. He has been passing a significant amount of gas. No fever. No chest pain or difficulty breathing. Abdominal pain associated with gas per patient. Abdomen is not more distended. No nausea or vomiting. He has been urinating, perhaps not as much as usual. They had been holding diuretic since discharge though they resumed these yesterday morning as well. They have additionally noticed some asymmetric leg swelling, right greater than left. Right leg (thigh/buttocks) also had been sore.     ED Course: Afebrile, HR 88, /88, sats 99% RA. Labs notable for elevated ammonia and elevated Cr above baseline. CT H obtained and negative. CT A/P obtained and showed no acute abnormality. Patient admitted to medicine for further workup and management.    Past Medical History    Past Medical History:   Diagnosis Date     Alcoholic cirrhosis of liver with ascites (H) 10/11/2019     Arthritis     RA     Chronic kidney disease      Coronary artery disease     AFib 2016     Diabetes mellitus type 2, uncomplicated (H) 10/11/2019     History of blood transfusion     2016     History of hemochromatosis 10/11/2019     Hypertension      Hypokalemia      Obesity      PAF (paroxysmal atrial fibrillation) (H)      Psoriatic arthritis (H)        Past Surgical History   Past Surgical History:   Procedure Laterality Date     APPENDECTOMY      Removed at 18 Years Old      COLONOSCOPY      2014 at [facility].      EYE SURGERY Bilateral     Cataract     GI SURGERY      TIPS     HERNIA REPAIR      History of bilateral inguinal hernia repair: 10/28/2014. Open hernia repair: 10/2017. Abdominal wound exploration and debridement 12/27/2017       Prior to Admission Medications   Prior to Admission Medications   Prescriptions Last Dose Informant Patient Reported?  Taking?   Calcium Carbonate-Vitamin D 500-3.125 MG-MCG TABS  Other Yes No   Sig: Take 1 tablet by mouth 2 times daily   XIFAXAN 550 MG TABS tablet  Other Yes No   Sig: Take 550 mg by mouth 2 times daily   bumetanide (BUMEX) 1 MG tablet  Other Yes No   Si mg 2 times daily   diltiazem ER (DILT-XR) 120 MG 24 hr capsule  Other Yes No   Sig: Take 120 mg by mouth   fidaxomicin (DIFICID) 200 MG tablet   No No   Sig: Take 1 tablet (200 mg) by mouth 2 times daily for 10 days   fidaxomicin (DIFICID) 200 MG tablet   No No   Sig: Take 1 tablet (200 mg) by mouth 2 times daily   folic acid (FOLVITE) 1 MG tablet  Other Yes No   Sig: TAKE FIVE TABLETS BY MOUTH EVERY DAY   lactulose (CHRONULAC) 10 GM/15ML solution  Other Yes No   Sig: TAKE 45 ML BY MOUTH THREE TIMES DAILY   multivitamin w/minerals (THERA-VIT-M) tablet  Other Yes No   Sig: Take 1 tablet by mouth daily   pantoprazole (PROTONIX) 40 MG EC tablet  Other Yes No   Sig: Take 20 mg by mouth daily   potassium chloride (KLOR-CON) 20 MEQ packet  Other Yes No   Sig: Take 20 mEq by mouth 2 times daily   predniSONE (DELTASONE) 5 MG tablet  Other Yes No   Sig: Take 10 mg by mouth   spironolactone (ALDACTONE) 50 MG tablet  Other Yes No   Sig: Take 50 mg by mouth daily      Facility-Administered Medications: None        Physical Exam   Vital Signs: Temp: 98.2  F (36.8  C) Temp src: Oral BP: (!) 154/98 Pulse: 93   Resp: 16 SpO2: 98 % O2 Device: None (Room air)    Weight: 210 lbs 0 oz  General: Resting in bed, awakens to exam and is answering some questions, seems confused by others, is calm, non-toxic appearing, NAD, wife at bedside  HEENT: NC/AT, EOMI, anicteric sclerae, MMM  Neck: Supple  CV: RRR, no murmurs  Respiratory: CTAB, breathing is non-labored  Abdomen: Distended, soft, there is mild tenderness to palpation throughout/not focal, no rebound or guarding, known umbilical hernia present/reducible, no other masses, hyperactive bowel sounds present  Ext: WWP, non-pitting  edema present as RLE appears > LLE, no redness, no pain to palpation of extremity  Skin: Warm, dry, no rashes  Neuro: Disoriented at times to situation, moves all extremities equally, asterixis is present      Data   Reviewed in Epic

## 2023-01-24 LAB
ALBUMIN SERPL BCG-MCNC: 2.9 G/DL (ref 3.5–5.2)
ALP SERPL-CCNC: 219 U/L (ref 40–129)
ALT SERPL W P-5'-P-CCNC: 54 U/L (ref 10–50)
ANION GAP SERPL CALCULATED.3IONS-SCNC: 19 MMOL/L (ref 7–15)
ANION GAP SERPL CALCULATED.3IONS-SCNC: 26 MMOL/L (ref 7–15)
AST SERPL W P-5'-P-CCNC: 47 U/L (ref 10–50)
AST SERPL W P-5'-P-CCNC: ABNORMAL U/L
BASE EXCESS BLDV CALC-SCNC: -2.3 MMOL/L (ref -7.7–1.9)
BILIRUB SERPL-MCNC: 1 MG/DL
BUN SERPL-MCNC: 91.3 MG/DL (ref 8–23)
BUN SERPL-MCNC: 92.1 MG/DL (ref 8–23)
CALCIUM SERPL-MCNC: 8.6 MG/DL (ref 8.8–10.2)
CALCIUM SERPL-MCNC: 9 MG/DL (ref 8.8–10.2)
CHLORIDE SERPL-SCNC: 101 MMOL/L (ref 98–107)
CHLORIDE SERPL-SCNC: 101 MMOL/L (ref 98–107)
CREAT SERPL-MCNC: 6.22 MG/DL (ref 0.67–1.17)
CREAT SERPL-MCNC: 6.3 MG/DL (ref 0.67–1.17)
DEPRECATED HCO3 PLAS-SCNC: 17 MMOL/L (ref 22–29)
DEPRECATED HCO3 PLAS-SCNC: 9 MMOL/L (ref 22–29)
ERYTHROCYTE [DISTWIDTH] IN BLOOD BY AUTOMATED COUNT: 14.7 % (ref 10–15)
GFR SERPL CREATININE-BSD FRML MDRD: 9 ML/MIN/1.73M2
GFR SERPL CREATININE-BSD FRML MDRD: 9 ML/MIN/1.73M2
GLUCOSE SERPL-MCNC: 104 MG/DL (ref 70–99)
GLUCOSE SERPL-MCNC: 85 MG/DL (ref 70–99)
HCO3 BLDV-SCNC: 22 MMOL/L (ref 21–28)
HCT VFR BLD AUTO: 41.3 % (ref 40–53)
HGB BLD-MCNC: 12.7 G/DL (ref 13.3–17.7)
HOLD SPECIMEN: NORMAL
MAGNESIUM SERPL-MCNC: 1.9 MG/DL (ref 1.7–2.3)
MCH RBC QN AUTO: 29.2 PG (ref 26.5–33)
MCHC RBC AUTO-ENTMCNC: 30.8 G/DL (ref 31.5–36.5)
MCV RBC AUTO: 95 FL (ref 78–100)
O2/TOTAL GAS SETTING VFR VENT: 21 %
PCO2 BLDV: 36 MM HG (ref 40–50)
PH BLDV: 7.4 [PH] (ref 7.32–7.43)
PHOSPHATE SERPL-MCNC: 6.2 MG/DL (ref 2.5–4.5)
PLATELET # BLD AUTO: 88 10E3/UL (ref 150–450)
PO2 BLDV: 43 MM HG (ref 25–47)
POTASSIUM SERPL-SCNC: 4.7 MMOL/L (ref 3.4–5.3)
POTASSIUM SERPL-SCNC: 5.4 MMOL/L (ref 3.4–5.3)
PROT SERPL-MCNC: 5.5 G/DL (ref 6.4–8.3)
RBC # BLD AUTO: 4.35 10E6/UL (ref 4.4–5.9)
SODIUM SERPL-SCNC: 136 MMOL/L (ref 136–145)
SODIUM SERPL-SCNC: 137 MMOL/L (ref 136–145)
SODIUM UR-SCNC: 23 MMOL/L
UFH PPP CHRO-ACNC: >1.1 IU/ML
UFH PPP CHRO-ACNC: >1.1 IU/ML
WBC # BLD AUTO: 16.6 10E3/UL (ref 4–11)

## 2023-01-24 PROCEDURE — 99222 1ST HOSP IP/OBS MODERATE 55: CPT | Mod: 24 | Performed by: INTERNAL MEDICINE

## 2023-01-24 PROCEDURE — 36415 COLL VENOUS BLD VENIPUNCTURE: CPT

## 2023-01-24 PROCEDURE — 250N000012 HC RX MED GY IP 250 OP 636 PS 637: Performed by: STUDENT IN AN ORGANIZED HEALTH CARE EDUCATION/TRAINING PROGRAM

## 2023-01-24 PROCEDURE — 250N000013 HC RX MED GY IP 250 OP 250 PS 637

## 2023-01-24 PROCEDURE — 120N000002 HC R&B MED SURG/OB UMMC

## 2023-01-24 PROCEDURE — 84155 ASSAY OF PROTEIN SERUM: CPT

## 2023-01-24 PROCEDURE — 250N000011 HC RX IP 250 OP 636

## 2023-01-24 PROCEDURE — 99233 SBSQ HOSP IP/OBS HIGH 50: CPT | Performed by: NURSE PRACTITIONER

## 2023-01-24 PROCEDURE — 999N000248 HC STATISTIC IV INSERT WITH US BY RN

## 2023-01-24 PROCEDURE — 82803 BLOOD GASES ANY COMBINATION: CPT

## 2023-01-24 PROCEDURE — 85027 COMPLETE CBC AUTOMATED: CPT

## 2023-01-24 PROCEDURE — 250N000013 HC RX MED GY IP 250 OP 250 PS 637: Performed by: STUDENT IN AN ORGANIZED HEALTH CARE EDUCATION/TRAINING PROGRAM

## 2023-01-24 PROCEDURE — 258N000003 HC RX IP 258 OP 636

## 2023-01-24 PROCEDURE — 85520 HEPARIN ASSAY: CPT | Performed by: HOSPITALIST

## 2023-01-24 PROCEDURE — 36415 COLL VENOUS BLD VENIPUNCTURE: CPT | Performed by: HOSPITALIST

## 2023-01-24 PROCEDURE — 99233 SBSQ HOSP IP/OBS HIGH 50: CPT | Mod: GC | Performed by: INTERNAL MEDICINE

## 2023-01-24 PROCEDURE — 84100 ASSAY OF PHOSPHORUS: CPT

## 2023-01-24 PROCEDURE — 83735 ASSAY OF MAGNESIUM: CPT

## 2023-01-24 RX ADMIN — PANTOPRAZOLE SODIUM 20 MG: 20 TABLET, DELAYED RELEASE ORAL at 07:45

## 2023-01-24 RX ADMIN — DILTIAZEM HYDROCHLORIDE 120 MG: 120 CAPSULE, COATED, EXTENDED RELEASE ORAL at 07:42

## 2023-01-24 RX ADMIN — LACTULOSE 20 G: 20 SOLUTION ORAL at 07:41

## 2023-01-24 RX ADMIN — FIDAXOMICIN 200 MG: 200 TABLET, FILM COATED ORAL at 21:13

## 2023-01-24 RX ADMIN — RIFAXIMIN 550 MG: 550 TABLET ORAL at 21:13

## 2023-01-24 RX ADMIN — LACTULOSE 20 G: 20 SOLUTION ORAL at 13:31

## 2023-01-24 RX ADMIN — SODIUM CHLORIDE, POTASSIUM CHLORIDE, SODIUM LACTATE AND CALCIUM CHLORIDE 1000 ML: 600; 310; 30; 20 INJECTION, SOLUTION INTRAVENOUS at 18:15

## 2023-01-24 RX ADMIN — LACTULOSE 20 G: 20 SOLUTION ORAL at 21:14

## 2023-01-24 RX ADMIN — Medication 1 TABLET: at 07:42

## 2023-01-24 RX ADMIN — FOLIC ACID 5 MG: 1 TABLET ORAL at 07:41

## 2023-01-24 RX ADMIN — PREDNISONE 10 MG: 5 TABLET ORAL at 07:44

## 2023-01-24 RX ADMIN — FIDAXOMICIN 200 MG: 200 TABLET, FILM COATED ORAL at 07:41

## 2023-01-24 RX ADMIN — POLYETHYLENE GLYCOL 3350 17 G: 17 POWDER, FOR SOLUTION ORAL at 07:43

## 2023-01-24 RX ADMIN — RIFAXIMIN 550 MG: 550 TABLET ORAL at 07:41

## 2023-01-24 RX ADMIN — POLYETHYLENE GLYCOL 3350 17 G: 17 POWDER, FOR SOLUTION ORAL at 13:31

## 2023-01-24 RX ADMIN — POLYETHYLENE GLYCOL 3350 17 G: 17 POWDER, FOR SOLUTION ORAL at 21:14

## 2023-01-24 RX ADMIN — HEPARIN SODIUM 1100 UNITS/HR: 10000 INJECTION, SOLUTION INTRAVENOUS at 09:01

## 2023-01-24 ASSESSMENT — ACTIVITIES OF DAILY LIVING (ADL)
ADLS_ACUITY_SCORE: 43
ADLS_ACUITY_SCORE: 29
ADLS_ACUITY_SCORE: 29
ADLS_ACUITY_SCORE: 45
ADLS_ACUITY_SCORE: 45
ADLS_ACUITY_SCORE: 43
ADLS_ACUITY_SCORE: 43
ADLS_ACUITY_SCORE: 45
ADLS_ACUITY_SCORE: 43
ADLS_ACUITY_SCORE: 45
ADLS_ACUITY_SCORE: 43
ADLS_ACUITY_SCORE: 45

## 2023-01-24 NOTE — PROGRESS NOTES
[facility]    Progress Note - Medicine Service, MAROON TEAM 2       Date of Admission:  1/23/2023    Assessment & Plan   Damion Quinones is a 67 year old male admitted on 1/23/2023. He has a PMH significant for EtOH associated cirrhosis c/b hepatic encephalopathy and esophageal varices s/p TIPS, paroxysmal atrial fibrillation, CKD, psoriatic arthritis, HTN, ANCA vasculitis, T2DM2, recent admission for c diff 1/15-1/16/23, now admitted for acute encephalopathy likely 2/2 hepatic encephalopathy.     Changes today:  - nephrology consult for PATI on CKD in setting of patient undergoing possible eval for SLK  - continue lactulose, rifaximin, miralax  - continue high intensity heparin infusion for DVT  - 1L LR bolus over 4 hours    Decompensated cirrhosis  Acute toxic metabolic encephalopathy, likely 2/2 hepatic encephalopathy  Cirrhosis 2/2 EtOH use c/b hepatic encephalopathy, ascites, and varices s/p TIPS and variceal banding. Liver transplant evaluation is underway. Ammonia on admit 186; acute hepatic encephalopathy likely explanation for his presenting altered mental status. Precipitating cause for this is possibly infection (such as known c diff) vs poor clearance (BMs in general have been slowing down). No e/o GI bleeding currently. Additional encephalopathy workup includes CT Head (no acute abnormality).  No other major metabolic disturbances beyond baseline to explain his AMS. No hypoxia. Given clinical stability, can hold empiric SBP treatment. Cautious with empiric abx in this patient unless obvious infection per earlier ID guidance (ex. avoid 3rd/4th gen cephalosporins and clindamycin whenever possible due to recurrent c diff). 1/23 UA with some pyuria but no symptoms, Bcx pending.  - Hepatology consult, appreciate recs    > peth pending  - Resume PTA lactulose, titrate to 2-3 stools per day.  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 Likely is due to hypovolemia in setting of his c diff infection and poor PO intake since last admission. Also need to consider HRS. Consulting nephrology due to inadequate response to fluid challenge.  - Given GI losses, minimal intake, cautiously hydrate  - Received 1L LR on 1/23 and another liter bolus on 1/24  - I/Os  - Avoid nephrotoxins  - Holding PTA diuretics given c/f dehydration  - Trend Cr  - Nephrology consult     Right lower extremity DVT  Presented with right greater than left lower extremity swelling, present for 1-2 days prior to admission, progressively worsening. RLE appears about twice the size of LLE. Doppler US 1/23 showing DVT in right leg.   - heparin infusion  - will likely transition to DOAC prior to discharge - likely apixaban given patient's renal function, but will inquire with nephrology    Recurrent c diff infection  Admitted recently for this. Hx of recurrence, at last admission was discharged on 10 day course of fidaxomicin per ID reccs. Still taking this. Per ID, if requiring other abx would need ppx with oral vancomycin 125mg BID through course.  - Continue fidaximicin through 1/25              - If requiring other abx, may need oral vanco ppx as well     Anion gap metabolic acidosis  Anion gap of 19 on presentation, in setting of CKD and uremia.  - treatment of hepatic encephalopathy, as above     Paroxysmal afib  Not anticoagulated outpatient.   - Continue PTA diltiazem     Psoriatic arthritis  On tapered steroid course, currently taking 10mg matt of prednisone  - continue pred taper  - consider PJP ppx if prolonged pred therapy       Diet: Combination Diet Regular Diet Adult   "  DVT Prophylaxis: on heparin infusion for DVT  Lux Catheter: Not present  Fluids: 1 L LR at mIVF  Lines: None     Cardiac Monitoring: None  Code Status: Full Code      Clinically Significant Risk Factors             # Anion Gap Metabolic Acidosis: Highest Anion Gap = 20 mmol/L in last 2 days, will monitor and treat as appropriate  # Hypoalbuminemia: Lowest albumin = 3.2 g/dL at 1/23/2023  5:50 AM, will monitor as appropriate   # Thrombocytopenia: Lowest platelets = 88 in last 2 days, will monitor for bleeding  # Acute Kidney Injury, unspecified: based on a >150% or 0.3 mg/dL increase in last creatinine compared to past 90 day average, will monitor renal function         # Obesity: Estimated body mass index is 31.93 kg/m  as calculated from the following:    Height as of this encounter: 1.727 m (5' 8\").    Weight as of this encounter: 95.3 kg (210 lb)., PRESENT ON ADMISSION         Disposition Plan     Expected Discharge Date: 01/27/2023                The patient's care was discussed with the Attending Physician, Dr. Samuels.    Rhianna Arriaga, MS3  Medical Student  Medicine Service, [address]  Securely message with [employer] (more info)  Text page via [facility] Paging/Directory   See signed in provider for up to date coverage information     I, Nishant Esteban MD, was present with the medical/GEORGE student who participated in the service and in the documentation of the note.  I have verified the history and personally performed the physical exam and medical decision making.  I agree with the assessment and plan of care as documented in the note and have made edits as needed.     Nishant Esteban MD  PGY1  Date of Service (when I saw the patient): 1/24/23    ______________________________________________________________________    Interval History   Nursing notes reviewed. Patient came to ED early this AM. Patient oriented to self and hospital, but not year. Says that he " "feels \"agitated\" and not himself. Patient's wife notes that he was up a lot after 1am. Has had multiple substantial BMs, but wife has not noticed much of an improvement in Casey's mental status. Notes that he sometimes says the opposite of what he means. Casey notes he feels nauseous and has not been eating much due to this.     Physical Exam   Vital Signs: Temp: 98  F (36.7  C) Temp src: Oral BP: (!) 150/99 Pulse: 79   Resp: 16 SpO2: 99 % O2 Device: None (Room air)    Weight: 210 lbs 0 oz     General: Sitting on side of bed, oriented to self and location but not year, non-toxic appearing, NAD, wife at bedside  HEENT: NC/AT, EOMI, anicteric sclerae, MMM  Neck: Supple  CV: RRR, no murmurs  Respiratory: CTAB, breathing is non-labored  Abdomen: Distended, soft, there is mild tenderness to palpation throughout/not focal, no rebound or guarding, known umbilical hernia present/reducible, no other masses, bowel sounds present  Ext: WWP, non-pitting edema present as RLE appears > LLE, no redness, no pain to palpation of extremity  Skin: Warm, dry, no rashes  Neuro: Moves all extremities equally, asterixis is present    Medical Decision Making       Please see A&P for additional details of medical decision making.      Data   ------------------------- PAST 24 HR DATA REVIEWED -----------------------------------------------    I have personally reviewed the following data over the past 24 hrs:    16.6 (H)  \   12.7 (L)   / 88 (L)     N/A N/A N/A /  N/A   N/A N/A N/A \       Imaging results reviewed over the past 24 hrs:   Recent Results (from the past 24 hour(s))   US Lower Extremity Venous Duplex Right   Result Value    Radiologist flags (Urgent)    Narrative    ULTRASOUND LOWER EXTREMITY VENOUS DUPLEX RIGHT 1/23/2023 3:17 PM    CLINICAL HISTORY: swelling, pain, r/o DVT.     COMPARISONS: None available.    REFERRING PROVIDER: JAMIE FRIED    TECHNIQUE: Grayscale, color Doppler, Doppler waveform ultrasound  evaluation was " "performed through the right common femoral, femoral,  and popliteal veins. Right posterior tibial and peroneal veins were  evaluated with grayscale imaging and compression.    Left common femoral vein was evaluated for symmetry.    FINDINGS: Left common femoral vein is patent, fully compressible, and  demonstrates normal phasic Doppler waveform.    Right common femoral vein is fully compressible with a phasic  waveform.    Right femoral vein is fully compressible with a phasic waveform in the  mid thigh.    Right femoral vein in the lower thigh is partially compressible with a  phasic waveform.    Right popliteal vein is occluded and non compressible    Right posterior tibial veins are fully compressible to the ankle.    Right peroneal veins are non compressible in the mid calf.      Impression    IMPRESSION:   1. Deep venous thrombosis in the right leg.        A. Non occlusive in the femoral vein in the lower thigh.       B. Occlusive in the popliteal vein.       C. Occlusive in the peroneal veins.    Reference: \"Duplex Ultrasound in the Diagnosis of Lower-Extremity Deep  Venous Thrombosis\"- Deena Garza MD, S; Ciro Maurice MD  (Circulation. 2014;129:917-921. http://circ.ahajournals.org)    [Access Center:   1. Deep venous thrombosis in the right leg.        A. Non occlusive in the femoral vein in the lower thigh.       B. Occlusive in the popliteal vein.       C. Occlusive in the peroneal veins.  ]    This report will be copied to the [facility] to ensure a  provider acknowledges the finding. Access Center is available Monday  through Friday 8am-3:30 pm.     JACIEL AU MD         SYSTEM ID:  [id number]   US Abdomen Limited with TIPSS Doppler    Narrative    ULTRASOUND ABDOMEN LIMITED WITH TIPS DOPPLER 1/23/2023 3:24 PM    CLINICAL HISTORY: 66 yo with decompensated cirrhosis, prior TIPSS,  here with acute hepatic encephalopathy.     COMPARISONS: Ultrasound abdomen complete with TIPS Doppler " 11/22/2022    REFERRING PROVIDER: JAMIE FRIED    TECHNIQUE: Right upper quadrant abdominal viscera evaluated with  grayscale imaging. TIPS, inferior vena cava and hepatic vasculature  evaluated with color Doppler and Doppler waveform ultrasound.    FINDINGS: Limited visualization due to patient body habitus and  inability to cooperate.    Liver: Limited visualization. 12.0 cm.  US visualization score: C - Severe limitations    Right kidney: 8.7 cm. Limited visualization..    Inferior vena cava: 1.7 cm.    Gallbladder: Obscured    Common bile duct: Obscured    Pancreas: Obscured.    TIPS:  Portal venous end: 99 cm/s  Mid: 118 cm/s  Hepatic venous end: 115 cm/s    Splenic vein: 42 cm/s, antegrade  Main portal vein: 54 cm/s, antegrade  Right portal vein: 16 cm/s, RETROGRADE  Left portal vein: 8 cm/s, RETROGRADE    Left hepatic vein: 16 cm/s, antegrade  Middle hepatic vein: 45 cm/s, antegrade  Right hepatic vein: 21 cm/s, antegrade    Inferior vena cava: 212 cm/s, phasic    Hepatic artery: 70/15 cm/s  Right hepatic artery: 74/20 cm/s  Left hepatic artery: 28/9 cm/s      Impression    IMPRESSION:  1. Visualization limited due to patient body habitus and inability to  cooperate with the examination.    2. Patent TIPS. Right and left portal veins are appropriately  retrograde in flow towards the TIPS.    FINDINGS SUGGESTIVE OF TIPS MALFUNCTION:       A. Peak shunt velocity < 90 cm/s or > 190 cm/s       B. Change in peak shunt velocity:            i. Decrease > 40 cm/s            ii. Increase > 60 cm/s       C. Distal shunt velocity < 90 cm/s or > or = 220 cm/s       D. Main portal vein velocity < or = 30 cm/s       E. Change in portal venous flow direction from retrograde to  antegrade         Jerry RY, Stephanie BELLO, Jodie WD, Greg KM, Teedong SA, Pilgram  TK. Doppler sonography findings associated with transjugular  intrahepatic portosystemic shunt malfunction. AJR Am J Roentgenol.  1997 Feb;168(2):467-72. doi:  [id number]. PMID: [id number].    I have personally reviewed the examination and initial interpretation  and I agree with the findings.    JACIEL AU MD         SYSTEM ID:  [id number]

## 2023-01-24 NOTE — PROGRESS NOTES
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 He was noted to have + DVT in right leg.     RECOMMENDATIONS:  # Hepatic encephalopathy  - has had increase in BM's overnight with improving mentation. Not yet as baseline.   - continue rifaximin, lactulose, miralax with a goal of 3-5 BM's per day   - Infectious work up so far negative.    # Right leg DVT  - on heparin for DVT with plans to transition to anticoagulation    # Alcohol/homozygous H63D  - MELD 22. In liver transplant eval with need for cardiac evaluation risk assessment  - US abd 1/23 wihtout HCC  - peth pending    # CKD  - creatinine remains elevated. Please involve nephrology  - Would need SLK for transplant if a candidate.     # EV  # s/p TIPS  - no evidnece of GIB. No ascites seen on imaging.   - no current evidence of TIPS malfunction.     # Debility  - PT/OT for mobilization.     Patient was discussed with attending physician, Dr. Poole      Next follow up appointment: tbd    Thank you for the opportunity to be involved with  Damion Quinones care. Please call with any questions or concerns.    JADE Castano, CNP  Inpatient Hepatology [facility]  Text Link        SUBJECTIVE:  Reports wanting to discharge home. He reports not yet at baseline for mentation. No melena or hematochezia.     ROS:  A 10-point review of systems was negative.    OBJECTIVE:  VS: BP (!) 150/99 (BP Location: Right arm, Patient Position: Supine, Cuff Size: Adult Large)   Pulse 79   Temp 98  F (36.7  C) (Oral)   Resp 16   Ht 1.727 m (5' 8\")   Wt 95.3 kg (210 lb)   SpO2 99%   BMI 31.93 kg/m        General: In no acute distress, mild facial muscle wasting  Neuro: " Drowsy, Ox3, No asterixis  HEENT:  Noscleral icterus  CV:  Skin warm and dry  Lungs:  Respirations even and nonlabored on room air  Abd:  Nondistended, nontender   Extrem: +1-2peripheral edema   Skin: Nojaundice  Psych: pleasant    MEDICATIONS:  Current Facility-Administered Medications   Medication     diltiazem ER COATED BEADS (CARDIZEM CD/CARTIA XT) 24 hr capsule 120 mg     fidaxomicin (DIFICID) tablet 200 mg     folic acid (FOLVITE) tablet 5 mg     heparin 25,000 units in 0.45% NaCl 250 mL ANTICOAGULANT infusion     lactated ringers BOLUS 1,000 mL     lactulose (CHRONULAC) solution 20 g     lidocaine (LMX4) cream     lidocaine 1 % 0.1-1 mL     melatonin tablet 1 mg     multivitamin w/minerals (THERA-VIT-M) tablet 1 tablet     ondansetron (ZOFRAN) injection 4 mg     pantoprazole (PROTONIX) EC tablet 20 mg     polyethylene glycol (MIRALAX) Packet 17 g     polyethylene glycol-propylene glycol PF (SYSTANE ULTRA PF) opthalmic solution 1 drop     predniSONE (DELTASONE) tablet 10 mg     rifaximin (XIFAXAN) tablet 550 mg     sodium chloride (PF) 0.9% PF flush 3 mL     sodium chloride (PF) 0.9% PF flush 3 mL     Current Outpatient Medications   Medication     bumetanide (BUMEX) 1 MG tablet     Calcium Carbonate-Vitamin D 500-3.125 MG-MCG TABS     diltiazem ER (DILT-XR) 120 MG 24 hr capsule     fidaxomicin (DIFICID) 200 MG tablet     folic acid (FOLVITE) 1 MG tablet     lactulose (CHRONULAC) 10 GM/15ML solution     multivitamin w/minerals (THERA-VIT-M) tablet     omeprazole 20 MG tablet     potassium chloride (KLOR-CON) 20 MEQ packet     predniSONE (DELTASONE) 5 MG tablet     spironolactone (ALDACTONE) 50 MG tablet     XIFAXAN 550 MG TABS tablet     zinc sulfate (ZINCATE) 220 (50 Zn) MG capsule     fidaxomicin (DIFICID) 200 MG tablet       REVIEW OF LABORATORY, PATHOLOGY AND IMAGING RESULTS:  BMP  Recent Labs   Lab 01/24/23  1014 01/24/23  0611 01/23/23  0550 01/23/23  0153    136 137 136   POTASSIUM 4.7 5.4* 4.8  5.1   CHLORIDE 101 101 101 99   NAZARIO 9.0 8.6* 9.3 9.4   CO2 17* 9* 17* 17*   BUN 91.3* 92.1* 94.1* 93.8*   CR 6.30* 6.22* 6.35* 6.28*   * 85 109* 124*     CBC  Recent Labs   Lab 01/24/23  0611 01/23/23  1649 01/23/23  0550 01/23/23  0153   WBC 16.6* 16.0* 15.3* 16.1*   RBC 4.35* 4.65 4.37* 4.53   HGB 12.7* 13.7 12.8* 13.1*   HCT 41.3 42.4 39.2* 41.1   MCV 95 91 90 91   MCH 29.2 29.5 29.3 28.9   MCHC 30.8* 32.3 32.7 31.9   RDW 14.7 14.9 14.7 14.7   PLT 88* 111* 106* 123*     INR  Recent Labs   Lab 01/23/23  0550   INR 1.23*     LFTs  Recent Labs   Lab 01/24/23  1014 01/24/23  0611 01/23/23  0550 01/23/23  0153   ALKPHOS  --  219* 220* 246*   AST 47  --  51* 58*   ALT  --  54* 56* 64*   BILITOTAL  --  1.0 0.9 0.9   PROTTOTAL  --  5.5* 5.7* 5.7*   ALBUMIN  --  2.9* 3.2* 3.2*        MELD-Na score: 22 at 1/24/2023 10:14 AM  MELD score: 22 at 1/24/2023 10:14 AM  Calculated from:  Serum Creatinine: 6.30 mg/dL (Using max of 4 mg/dL) at 1/24/2023 10:14 AM  Serum Sodium: 137 mmol/L at 1/24/2023 10:14 AM  Total Bilirubin: 1.0 mg/dL at 1/24/2023  6:11 AM  INR(ratio): 1.23 at 1/23/2023  5:50 AM  Age: 67 years      Imaging Results:    US abd w TIPS 1/23/23  IMPRESSION:  1. Visualization limited due to patient body habitus and inability to  cooperate with the examination.     2. Patent TIPS. Right and left portal veins are appropriately  retrograde in flow towards the TIPS.    CT abd wo 1/23/23  IMPRESSION:   1.  No acute abnormality.  2.  Paraumbilical hernia containing small bowel loops without obstruction.  3.  Cirrhotic liver with TIPS.  4.  No significant change in subcapsular hematoma at the posterior aspect of the left kidney.

## 2023-01-24 NOTE — PROGRESS NOTES
"BP (!) 150/99 (BP Location: Right arm, Patient Position: Supine, Cuff Size: Adult Large)   Pulse 79   Temp 98  F (36.7  C) (Oral)   Resp 16   Ht 1.727 m (5' 8\")   Wt 95.3 kg (210 lb)   SpO2 99%   BMI 31.93 kg/m          [id number]    Alert and oriented to self. Wife at bedside. Heparin @ 1100 units/hr, next heparin 10a at 1500. Will continue to monitor.  "

## 2023-01-25 ENCOUNTER — APPOINTMENT (OUTPATIENT)
Dept: PHYSICAL THERAPY | Facility: CLINIC | Age: 68
DRG: 441 | End: 2023-01-25
Attending: INTERNAL MEDICINE
Payer: MEDICARE

## 2023-01-25 LAB
ALBUMIN SERPL BCG-MCNC: 2.9 G/DL (ref 3.5–5.2)
ALP SERPL-CCNC: 210 U/L (ref 40–129)
ALT SERPL W P-5'-P-CCNC: 47 U/L (ref 10–50)
ANION GAP SERPL CALCULATED.3IONS-SCNC: 20 MMOL/L (ref 7–15)
AST SERPL W P-5'-P-CCNC: 44 U/L (ref 10–50)
BASE EXCESS BLDV CALC-SCNC: -4 MMOL/L (ref -7.7–1.9)
BILIRUB SERPL-MCNC: 0.9 MG/DL
BUN SERPL-MCNC: 88.2 MG/DL (ref 8–23)
CALCIUM SERPL-MCNC: 8.9 MG/DL (ref 8.8–10.2)
CHLORIDE SERPL-SCNC: 101 MMOL/L (ref 98–107)
CREAT SERPL-MCNC: 6.32 MG/DL (ref 0.67–1.17)
CYSTATIN C (ROCHE): 3.7 MG/L (ref 0.6–1)
DEPRECATED HCO3 PLAS-SCNC: 14 MMOL/L (ref 22–29)
ERYTHROCYTE [DISTWIDTH] IN BLOOD BY AUTOMATED COUNT: 14.8 % (ref 10–15)
GFR SERPL CREATININE-BSD FRML MDRD: 13 ML/MIN/1.73M2
GFR SERPL CREATININE-BSD FRML MDRD: 9 ML/MIN/1.73M2
GLUCOSE SERPL-MCNC: 117 MG/DL (ref 70–99)
HCO3 BLDV-SCNC: 20 MMOL/L (ref 21–28)
HCT VFR BLD AUTO: 36.3 % (ref 40–53)
HGB BLD-MCNC: 11.6 G/DL (ref 13.3–17.7)
HOLD SPECIMEN: NORMAL
MAGNESIUM SERPL-MCNC: 2 MG/DL (ref 1.7–2.3)
MCH RBC QN AUTO: 29.1 PG (ref 26.5–33)
MCHC RBC AUTO-ENTMCNC: 32 G/DL (ref 31.5–36.5)
MCV RBC AUTO: 91 FL (ref 78–100)
O2/TOTAL GAS SETTING VFR VENT: 0 %
PCO2 BLDV: 33 MM HG (ref 40–50)
PH BLDV: 7.39 [PH] (ref 7.32–7.43)
PHOSPHATE SERPL-MCNC: 6.2 MG/DL (ref 2.5–4.5)
PLATELET # BLD AUTO: 99 10E3/UL (ref 150–450)
PLPETH BLD-MCNC: <10 NG/ML
PO2 BLDV: 50 MM HG (ref 25–47)
POPETH BLD-MCNC: <10 NG/ML
POTASSIUM SERPL-SCNC: 4.4 MMOL/L (ref 3.4–5.3)
PROT SERPL-MCNC: 5.1 G/DL (ref 6.4–8.3)
RBC # BLD AUTO: 3.99 10E6/UL (ref 4.4–5.9)
SODIUM SERPL-SCNC: 135 MMOL/L (ref 136–145)
UFH PPP CHRO-ACNC: 0.18 IU/ML
UFH PPP CHRO-ACNC: 0.24 IU/ML
UFH PPP CHRO-ACNC: 0.48 IU/ML
UFH PPP CHRO-ACNC: >1.1 IU/ML
WBC # BLD AUTO: 13.7 10E3/UL (ref 4–11)

## 2023-01-25 PROCEDURE — 83735 ASSAY OF MAGNESIUM: CPT

## 2023-01-25 PROCEDURE — 36415 COLL VENOUS BLD VENIPUNCTURE: CPT | Performed by: INTERNAL MEDICINE

## 2023-01-25 PROCEDURE — 250N000013 HC RX MED GY IP 250 OP 250 PS 637: Performed by: STUDENT IN AN ORGANIZED HEALTH CARE EDUCATION/TRAINING PROGRAM

## 2023-01-25 PROCEDURE — 36415 COLL VENOUS BLD VENIPUNCTURE: CPT | Performed by: STUDENT IN AN ORGANIZED HEALTH CARE EDUCATION/TRAINING PROGRAM

## 2023-01-25 PROCEDURE — 97530 THERAPEUTIC ACTIVITIES: CPT | Mod: GP

## 2023-01-25 PROCEDURE — 120N000002 HC R&B MED SURG/OB UMMC

## 2023-01-25 PROCEDURE — 82803 BLOOD GASES ANY COMBINATION: CPT | Performed by: STUDENT IN AN ORGANIZED HEALTH CARE EDUCATION/TRAINING PROGRAM

## 2023-01-25 PROCEDURE — 99233 SBSQ HOSP IP/OBS HIGH 50: CPT | Mod: GC | Performed by: INTERNAL MEDICINE

## 2023-01-25 PROCEDURE — 97161 PT EVAL LOW COMPLEX 20 MIN: CPT | Mod: GP

## 2023-01-25 PROCEDURE — 85027 COMPLETE CBC AUTOMATED: CPT

## 2023-01-25 PROCEDURE — 250N000013 HC RX MED GY IP 250 OP 250 PS 637

## 2023-01-25 PROCEDURE — 82947 ASSAY GLUCOSE BLOOD QUANT: CPT

## 2023-01-25 PROCEDURE — 97116 GAIT TRAINING THERAPY: CPT | Mod: GP

## 2023-01-25 PROCEDURE — 36415 COLL VENOUS BLD VENIPUNCTURE: CPT | Performed by: HOSPITALIST

## 2023-01-25 PROCEDURE — 85520 HEPARIN ASSAY: CPT | Performed by: INTERNAL MEDICINE

## 2023-01-25 PROCEDURE — 84155 ASSAY OF PROTEIN SERUM: CPT

## 2023-01-25 PROCEDURE — 99233 SBSQ HOSP IP/OBS HIGH 50: CPT | Performed by: NURSE PRACTITIONER

## 2023-01-25 PROCEDURE — 82610 CYSTATIN C: CPT | Performed by: INTERNAL MEDICINE

## 2023-01-25 PROCEDURE — 36415 COLL VENOUS BLD VENIPUNCTURE: CPT

## 2023-01-25 PROCEDURE — 250N000011 HC RX IP 250 OP 636

## 2023-01-25 PROCEDURE — 99233 SBSQ HOSP IP/OBS HIGH 50: CPT | Mod: 24 | Performed by: INTERNAL MEDICINE

## 2023-01-25 PROCEDURE — 84100 ASSAY OF PHOSPHORUS: CPT

## 2023-01-25 PROCEDURE — 85520 HEPARIN ASSAY: CPT | Performed by: HOSPITALIST

## 2023-01-25 PROCEDURE — 250N000012 HC RX MED GY IP 250 OP 636 PS 637: Performed by: STUDENT IN AN ORGANIZED HEALTH CARE EDUCATION/TRAINING PROGRAM

## 2023-01-25 RX ORDER — LACTULOSE 10 G/15ML
100 SOLUTION ORAL
Status: DISCONTINUED | OUTPATIENT
Start: 2023-01-25 | End: 2023-01-28 | Stop reason: HOSPADM

## 2023-01-25 RX ORDER — LACTULOSE 10 G/15ML
20 SOLUTION ORAL
Status: DISCONTINUED | OUTPATIENT
Start: 2023-01-25 | End: 2023-01-28 | Stop reason: HOSPADM

## 2023-01-25 RX ADMIN — LACTULOSE 100 G: 10 SOLUTION ORAL; RECTAL at 18:07

## 2023-01-25 RX ADMIN — FOLIC ACID 5 MG: 1 TABLET ORAL at 08:03

## 2023-01-25 RX ADMIN — LACTULOSE 20 G: 20 SOLUTION ORAL at 14:33

## 2023-01-25 RX ADMIN — LACTULOSE 20 G: 20 SOLUTION ORAL at 08:03

## 2023-01-25 RX ADMIN — FIDAXOMICIN 200 MG: 200 TABLET, FILM COATED ORAL at 19:59

## 2023-01-25 RX ADMIN — ONDANSETRON 4 MG: 2 INJECTION INTRAMUSCULAR; INTRAVENOUS at 19:55

## 2023-01-25 RX ADMIN — LACTULOSE 20 G: 20 SOLUTION ORAL at 22:13

## 2023-01-25 RX ADMIN — DILTIAZEM HYDROCHLORIDE 120 MG: 120 CAPSULE, COATED, EXTENDED RELEASE ORAL at 08:03

## 2023-01-25 RX ADMIN — PANTOPRAZOLE SODIUM 20 MG: 20 TABLET, DELAYED RELEASE ORAL at 08:03

## 2023-01-25 RX ADMIN — FIDAXOMICIN 200 MG: 200 TABLET, FILM COATED ORAL at 10:03

## 2023-01-25 RX ADMIN — LACTULOSE 20 G: 20 SOLUTION ORAL at 19:59

## 2023-01-25 RX ADMIN — RIFAXIMIN 550 MG: 550 TABLET ORAL at 19:59

## 2023-01-25 RX ADMIN — RIFAXIMIN 550 MG: 550 TABLET ORAL at 08:03

## 2023-01-25 RX ADMIN — HEPARIN SODIUM 800 UNITS/HR: 10000 INJECTION, SOLUTION INTRAVENOUS at 00:40

## 2023-01-25 RX ADMIN — PREDNISONE 10 MG: 5 TABLET ORAL at 08:04

## 2023-01-25 RX ADMIN — Medication 1 TABLET: at 08:03

## 2023-01-25 ASSESSMENT — ACTIVITIES OF DAILY LIVING (ADL)
ADLS_ACUITY_SCORE: 29
DEPENDENT_IADLS:: INDEPENDENT
ADLS_ACUITY_SCORE: 29

## 2023-01-25 NOTE — CONSULTS
Nephrology Initial Consult  January 24, 2023      Damion Quinones MRN:[id number] YOB: 1955  Date of Admission:1/23/2023  Primary care provider: No Ref-Primary, Physician  Requesting physician: Fany Samuels MD    ASSESSMENT AND RECOMMENDATIONS:   Mr. 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 Patient was admitted 1/23 with three days of progressive confusion concerning for decompensated cirrhosis with hepatic encephalopathy. Found to have leukocytosis, DVT of RLE, and PATI on CKD with AGMA. Given 1L LR and PTA diuretics held due to concern for PATI 2/2 hypovolemia. Recent renal biopsy 9/9/2022 demonstrated severe interstitial fibrosis and tubular atrophy (80%) as well as IgA deposits concerning for IgA nephropathy. Patient was treated for IgA nephropathy with rituximab and prednisone with progressive increase in creatinine. Given results of renal biopsy and limited response to treatment for IgA nephropathy, expect that patient is approaching/is in end-stage renal failure. Discussed options for management with family, and they were in agreement with beginning hemodialysis. Recommend IR consult for tunneled dialysis catheter placement tomorrow and initiation of hemodialysis when possible.    - Recommend IR consult for tunneled dialysis catheter placement  - Recommend starting hemodialysis    Recommendations were communicated to primary team via note    Seen and discussed with Dr. Scottie Ruiz, MS4  Division of Renal Disease and Hypertension  [facility]  yesi Klein Web Console    Attending Attestation:  This patient has been seen and evaluated by me, Eddie BONDS  "MD Scottie on January 24, 2023  jointly with medical student Sasha Ruiz and nephrology fellow Dr Nieto.  Discussed with Dr Nieto and agree with the findings and plan in this note.  I have reviewed the medical chart,  Medications, Vital Signs and Labs.  Patient with complex past history with DODGE, psoriatic arthritis, gout.  His is s/p TIPS, and has a history of esophageal varices with bleeding and banding procedures.  He has a history of hepatic encephalopathy, with more frequent events since TIPS.He has a history of severe kidney injury, with a kidney biopsy in Sept 2022 which revealed severe intersititial fibrosis and glomerular IgA  Deposition but \"without overt necrotizing/crescentic glomerular injury concerning for overt vasculitis, though there were additional findings of interstitial hemorrhage into her in interstitial inflammation was concerning for unsampled vasculitis no findings suggestive for more systemic vasculitis noted, including no inflammatory pneumonitis on chest x-ray imaging nor cutaneous involvement.   However, additional workup notable for significant elevation of systemic inflammatory markers (, CRP 12.2). 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   Therefore the treatment with rituximab + corticosteroids was for a presumptive diagnosis of unsampled ANCA GN rather than for IgAN (which is likely secondary to liver disease).  Mrs Quinones reports that he received 1 treatment with dialysis (in September),but that Mr Quinones's creatinine has not decreased below ~ 4.5 since then.    Patient presents " "today with increased confusion intermittently.  New Diagnosis of R DVT.           BP (!) 148/74 (BP Location: Right arm)   Pulse 76   Temp 97.6  F (36.4  C) (Oral)   Resp 18   Ht 1.727 m (5' 8\")   Wt 95.3 kg (210 lb)   SpO2 100%   BMI 31.93 kg/m     Patient is alert.   Following the conversation and responding occasionally appropriately, although most of the conversation was with patient's wife.  R subconj hemorrhage  Cor: RRR, no rub heard  Lungs clear  Abd distended, with protuberant umbilical hernia  L Ext 2+ edema on L, 3+ edema on R  MSK:  Large tophus R second MCP joint  Skin diffuse subdermal ecchymotic lesions (consistent with steroid therapy)  CMP  Recent Labs   Lab Test 01/24/23  1014 01/24/23  0611 01/23/23  0550 01/23/23  0153    136 137 136   POTASSIUM 4.7 5.4* 4.8 5.1   CHLORIDE 101 101 101 99   CO2 17* 9* 17* 17*   BUN 91.3* 92.1* 94.1* 93.8*   CR 6.30* 6.22* 6.35* 6.28*   ALBUMIN  --  2.9* 3.2* 3.2*     A/P  Severe renal failure,with recent modest worsening over CKD 5 since probably September 2023. Given the report of severe intersitial fibrosis on biopsy, the likelihood of recovery of kidney function (now 4 + months since the biopsy, and 3 months since immunosuppression was started) is very unlikely  Mr and Ms Quinones brought up the question regarding goals of care.  He is evaluated by hepatology and currently deemed (a priori) a possible candidate for liver transplantation.  We discussed kidney replacement therapy.    In the setting of a normal blood pressure, and with the ongoing workup for liver transplant, dialysis is certainly an option option as a bridge to transplantation.  We discussed that Mr Quinones may chose to stop dialysis if he does not tolerated or if he is deemed not to be a transplant candidate.       Eddie Rubin MD  Division of Nephrology and Hypertension  Pager         REASON FOR CONSULT: PATI on CKD    HISTORY OF PRESENT ILLNESS:  Admitting provider and " nursing notes reviewed  Mr. Quinones is a 67 year old male with past medical history of hereditary hemochromatosis, cirrhosis c/b hepatic encephalopathy and bleeding esophageal varices s/p banding and TIPS, CKD V with severe interstitial fibrosis and IgA nephropathy on renal biopsy s/p rituximab/prednisone, paroxysmal atrial fibrillation, psoriatic arthritis, hypertension, type 2 diabetes mellitus, and recent c difficile on fidaxomicin. Currently Patient was admitted 1/23 with encephalopathy concerning for decompensated cirrhosis with hepatic encephalopathy. Found to have leukocytosis, DVT of RLE, and PATI with AGMA. Renal biopsy 9/9/2022 demonstrated severe interstitial fibrosis and tubular atrophy (80%) as well as IgA deposits concerning for IgA nephropathy. Patient was treated for IgA nephropathy with rituximab and prednisone with progressive increase in creatinine over the past three months. Given results of renal biopsy and limited response with treatment for IgA nephropathy, expect that patient is in/approaching end-stage renal failure. Discussed options for management with family, and they were in agreement with beginning hemodialysis. Recommend IR consult for tunneled dialysis catheter placement tomorrow and initiation of hemodialysis when possible.    PAST MEDICAL HISTORY:  Reviewed with patient on 01/24/2023     Past Medical History:   Diagnosis Date    Alcoholic cirrhosis of liver with ascites (H) 10/11/2019    Arthritis     RA    Chronic kidney disease     Coronary artery disease     AFib 2016    Diabetes mellitus type 2, uncomplicated (H) 10/11/2019    History of blood transfusion     2016    History of hemochromatosis 10/11/2019    Hypertension     Hypokalemia     Obesity     PAF (paroxysmal atrial fibrillation) (H)     Psoriatic arthritis (H)        Past Surgical History:   Procedure Laterality Date    APPENDECTOMY      Removed at 18 Years Old     COLONOSCOPY      2014 at [facility]     EYE SURGERY  Bilateral     Cataract    GI SURGERY      TIPS    HERNIA REPAIR      History of bilateral inguinal hernia repair: 10/28/2014. Open hernia repair: 10/2017. Abdominal wound exploration and debridement 12/27/2017        MEDICATIONS:  PTA Meds  Prior to Admission medications    Medication Sig Last Dose Taking? Auth Provider Long Term End Date   bumetanide (BUMEX) 1 MG tablet 2 mg 2 times daily 1/22/2023 Yes Reported, Patient Yes    Calcium Carbonate-Vitamin D 500-3.125 MG-MCG TABS Take 1 tablet by mouth 2 times daily 1/22/2023 Yes Reported, Patient     diltiazem ER (DILT-XR) 120 MG 24 hr capsule Take 120 mg by mouth 1/22/2023 Yes Reported, Patient Yes    fidaxomicin (DIFICID) 200 MG tablet Take 1 tablet (200 mg) by mouth 2 times daily for 10 days 1/22/2023 Yes Danny Garcia MD  1/27/23   folic acid (FOLVITE) 1 MG tablet TAKE FIVE TABLETS BY MOUTH EVERY DAY 1/22/2023 Yes Reported, Patient     lactulose (CHRONULAC) 10 GM/15ML solution Take 20 g by mouth 3 times daily 1/22/2023 Yes Reported, Patient     multivitamin w/minerals (THERA-VIT-M) tablet Take 1 tablet by mouth daily 1/22/2023 Yes Reported, Patient     omeprazole 20 MG tablet Take 40 mg by mouth 2 times daily 1/22/2023 Yes Unknown, Entered By History     potassium chloride (KLOR-CON) 20 MEQ packet Take 20 mEq by mouth 2 times daily 1/22/2023 Yes Reported, Patient     predniSONE (DELTASONE) 5 MG tablet Take 10 mg by mouth 1/22/2023 Yes Reported, Patient     spironolactone (ALDACTONE) 50 MG tablet Take 25 mg by mouth 2 times daily 1/22/2023 Yes Reported, Patient     XIFAXAN 550 MG TABS tablet Take 550 mg by mouth 2 times daily 1/22/2023 Yes Reported, Patient     zinc sulfate (ZINCATE) 220 (50 Zn) MG capsule Take 220 mg by mouth daily 1/22/2023 Yes Unknown, Entered By History     fidaxomicin (DIFICID) 200 MG tablet Take 1 tablet (200 mg) by mouth 2 times daily   Danny Garcia MD        Current Meds   diltiazem ER COATED BEADS  120 mg Oral Daily    fidaxomicin  200  mg Oral BID    folic acid  5 mg Oral Daily    lactated ringers  1,000 mL Intravenous Once    lactulose  20 g Oral TID    multivitamin w/minerals  1 tablet Oral Daily    pantoprazole  20 mg Oral Daily    polyethylene glycol  17 g Oral TID    predniSONE  10 mg Oral Daily    rifaximin  550 mg Oral BID    sodium chloride (PF)  3 mL Intracatheter Q8H     Infusion Meds   heparin Stopped (23)       ALLERGIES:    No Known Allergies    REVIEW OF SYSTEMS:  A comprehensive of systems was negative except as noted above.    SOCIAL HISTORY:   Social History     Socioeconomic History    Marital status:      Spouse name: Not on file    Number of children: Not on file    Years of education: Not on file    Highest education level: Not on file   Occupational History    Not on file   Tobacco Use    Smoking status: Never    Smokeless tobacco: Never   Substance and Sexual Activity    Alcohol use: Not Currently     Comment: Last ETOH use was 2021    Drug use: Not Currently    Sexual activity: Not on file   Other Topics Concern    Parent/sibling w/ CABG, MI or angioplasty before 65F 55M? Not Asked   Social History Narrative    Not on file     Social Determinants of Health     Financial Resource Strain: Not on file   Food Insecurity: Not on file   Transportation Needs: Not on file   Physical Activity: Not on file   Stress: Not on file   Social Connections: Not on file   Intimate Partner Violence: Not on file   Housing Stability: Not on file     Reviewed with patient.   Spouse Apoorva Quinones accompanies Damion Quinones in hospital room    FAMILY MEDICAL HISTORY:   Family History   Problem Relation Age of Onset    Lung Cancer Mother     Colon Cancer Father     Lung Cancer Brother     Heart Failure Brother     Kidney Disease Brother        PHYSICAL EXAM:   Temp  Av.3  F (36.8  C)  Min: 97.5  F (36.4  C)  Max: 99.2  F (37.3  C)      Pulse  Av.5  Min: 66  Max: 108 Resp  Av.6  Min: 10  Max: 28  SpO2  Av.4 %   "Min: 97 %  Max: 100 %       BP (!) 150/99 (BP Location: Right arm, Patient Position: Supine, Cuff Size: Adult Large)   Pulse 79   Temp 98  F (36.7  C) (Oral)   Resp 16   Ht 1.727 m (5' 8\")   Wt 95.3 kg (210 lb)   SpO2 99%   BMI 31.93 kg/m        Admit Weight: 95.3 kg (210 lb)     GENERAL APPEARANCE: Sitting up in bed, disoriented  EYES: Has scleral icterus, pupils equal  Lymphatics: no cervical or supraclavicular LAD  Pulmonary: lungs clear to auscultation with equal breath sounds bilaterally, no clubbing  CV: Regular rhythm, normal rate, no rub   - No lower extremity edema  GI: soft, nontender, normal bowel sounds  MS: Hands with stiffness and swelling, no muscle tenderness  : No stauffer  SKIN: no rash, warm, dry  NEURO: face symmetric, has asterixis on exam    LABS:   I have reviewed the following labs:  CMP  Recent Labs   Lab 01/24/23  1014 01/24/23  0611 01/23/23  0550 01/23/23  0153    136 137 136   POTASSIUM 4.7 5.4* 4.8 5.1   CHLORIDE 101 101 101 99   CO2 17* 9* 17* 17*   ANIONGAP 19* 26* 19* 20*   * 85 109* 124*   BUN 91.3* 92.1* 94.1* 93.8*   CR 6.30* 6.22* 6.35* 6.28*   GFRESTIMATED 9* 9* 9* 9*   NAZARIO 9.0 8.6* 9.3 9.4   MAG  --  1.9  --   --    PHOS  --  6.2*  --   --    PROTTOTAL  --  5.5* 5.7* 5.7*   ALBUMIN  --  2.9* 3.2* 3.2*   BILITOTAL  --  1.0 0.9 0.9   ALKPHOS  --  219* 220* 246*   AST 47  --  51* 58*   ALT  --  54* 56* 64*     CBC  Recent Labs   Lab 01/24/23  0611 01/23/23  1649 01/23/23  0550 01/23/23  0153   HGB 12.7* 13.7 12.8* 13.1*   WBC 16.6* 16.0* 15.3* 16.1*   RBC 4.35* 4.65 4.37* 4.53   HCT 41.3 42.4 39.2* 41.1   MCV 95 91 90 91   MCH 29.2 29.5 29.3 28.9   MCHC 30.8* 32.3 32.7 31.9   RDW 14.7 14.9 14.7 14.7   PLT 88* 111* 106* 123*     INR  Recent Labs   Lab 01/23/23  0550   INR 1.23*     ABG  Recent Labs   Lab 01/24/23  1015   O2PER 21      URINE STUDIES  Recent Labs   Lab Test 01/23/23  0828 01/15/23  1226 12/16/22  1917 11/22/22  0858   COLOR Light Yellow Light " Yellow Light Yellow Light Yellow   APPEARANCE Clear Clear Clear Clear   URINEGLC Negative Negative Negative Negative   URINEBILI Negative Negative Negative Negative   URINEKETONE Negative Negative Negative Negative   SG 1.013 1.011 1.010 1.009   UBLD Negative Negative Negative Negative   URINEPH 6.0 5.5 5.5 6.0   PROTEIN 20* Negative Negative Negative   NITRITE Negative Negative Negative Negative   LEUKEST Negative Negative Negative Negative   RBCU <1 <1 <1  --    WBCU 8* 3 <1  --      No lab results found.  PTH  No lab results found.  IRON STUDIES  Recent Labs   Lab Test 11/22/22  0848   IRON 68   *   IRONSAT 31   HOLA 429*       IMAGING:  All imaging was reviewed by me.    Sasha Ruiz

## 2023-01-25 NOTE — PROGRESS NOTES
Nephrology Progress Note  01/25/2023         Assessment & Recommendations:   Mr. 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 Patient was admitted 1/23 with three days of progressive confusion concerning for decompensated cirrhosis with hepatic encephalopathy. Found to have leukocytosis, DVT of RLE, and PATI on CKD.    CKD5  Patient with complex past history with DODGE, psoriatic arthritis, gout. His is s/p TIPS, and has a history of esophageal varices with bleeding and banding procedures. He has a history of hepatic encephalopathy, with more frequent events since TIPS. He has a history of severe kidney injury, with a kidney biopsy in Sept 2022 which revealed severe intersititial fibrosis and glomerular IgA deposition but without overt necrotizing/crescentic glomerular, however given positive ANCA and concern for potential unsampled vasculitis patient started steroids and received induction rituximab in Oct 2022. With recurrent PATI Cr has remained >4.5 since and even on the biopsy in Sept patient had significant evidence of advanced CKD with 70-80% IFTA noted  - Had long discussion with patient and wife today describing the inevitability of needing RRT soon and they agreed to proceed, consent obtained and scanned in 1//25  - Please involve IR for tunneled catheter placement. Will initiate HD thereafter  - Daily renal panel  - Avoid nephrotoxins  - Renally dose medications  - Renal diet  - Daily weights  - Strict I/Os    Recommendations were communicated to primary team verbally    Seen and discussed with Dr. Scottie Nieto MD   Division of Renal Disease and  "Hypertension  Amcom  yesi  Vocera Web Console    Interval History :   Nursing and provider notes from last 24 hours reviewed.  In the last 24 hours Damion Quinones continued to feel foggy and loose stools continue. Continues to make some urine. Denies any new concerns, including SOB. Reports poor energy and appetite, no recent emesis.    Physical Exam:   I/O last 3 completed shifts:  In: 120 [P.O.:120]  Out: -    BP (!) 142/80 (BP Location: Right arm)   Pulse 87   Temp 97.7  F (36.5  C) (Oral)   Resp 18   Ht 1.727 m (5' 8\")   Wt 94.3 kg (207 lb 14.4 oz)   SpO2 99%   BMI 31.61 kg/m       GENERAL APPEARANCE: NAD  EYES: R conjunctival hemorrhage  PULM: breathing non-labored  CV: regular rhythm, normal rate, no rub     -edema ++   GI: soft, distended  NEURO:  AOx2, normal speech    Labs:   All labs reviewed by me  Electrolytes/Renal - Recent Labs   Lab Test 01/25/23  0631 01/24/23  1014 01/24/23  0611 12/17/22  0114 12/16/22  1652   * 137 136   < > 130*   POTASSIUM 4.4 4.7 5.4*   < > 5.7*   CHLORIDE 101 101 101   < > 94*   CO2 14* 17* 9*   < > 19*   BUN 88.2* 91.3* 92.1*   < > 104.0*   CR 6.32* 6.30* 6.22*   < > 6.50*   * 104* 85   < > 109*   NAZARIO 8.9 9.0 8.6*   < > 9.0   MAG 2.0  --  1.9  --  2.3   PHOS 6.2*  --  6.2*  --  6.3*    < > = values in this interval not displayed.       CBC -   Recent Labs   Lab Test 01/25/23  0631 01/24/23  0611 01/23/23  1649   WBC 13.7* 16.6* 16.0*   HGB 11.6* 12.7* 13.7   PLT 99* 88* 111*       LFTs -   Recent Labs   Lab Test 01/25/23  0631 01/24/23  1014 01/24/23  0611 01/23/23  0550   ALKPHOS 210*  --  219* 220*   BILITOTAL 0.9  --  1.0 0.9   ALT 47  --  54* 56*   AST 44 47  --  51*   PROTTOTAL 5.1*  --  5.5* 5.7*   ALBUMIN 2.9*  --  2.9* 3.2*       Iron Panel -   Recent Labs   Lab Test 11/22/22  0848   IRON 68   IRONSAT 31   HOLA 429*     Imaging:  All imaging studies reviewed by me.     Current Medications:    diltiazem ER COATED BEADS  120 mg Oral Daily     " fidaxomicin  200 mg Oral BID     folic acid  5 mg Oral Daily     lactulose  20 g Oral TID     multivitamin w/minerals  1 tablet Oral Daily     pantoprazole  20 mg Oral Daily     polyethylene glycol  17 g Oral TID     predniSONE  10 mg Oral Daily     rifaximin  550 mg Oral BID     sodium chloride (PF)  3 mL Intracatheter Q8H       heparin 650 Units/hr (01/25/23 0873)     Que Nieto MD

## 2023-01-25 NOTE — PLAN OF CARE
Goal Outcome Evaluation:      Plan of Care Reviewed With: patient, spouse    Overall Patient Progress: no changeOverall Patient Progress: no change    Outcome Evaluation: patient continues confused,  unable to decern how to put feet on floor to sit on side of bed.    Reason for admission: encephalopathy  Admitted from: ED  Report received from: Arcenio PADGETT  2 RN skin assessment completed by:Lizeth DELACRUZ and Piedad RN       -Findings (add LDA if needed): umbilical hernia, corn outside right foot, bruises on arms.  Bed Algorithm reevaluated: yes  Was Pulsate ordered?: no  Care plan (primary problem) and Education initiated:  yes  Flu shot ordered (October-April only): no   Detailed Belongings: camo fleece jacket, tennis shoes.        RN Decompensation Assessment (Repeat at 12 hours)    (Additional guidance for RRT)      Mentation:  Exam is notable for abnormal mental status    Respiratory mechanics and oxygenation:  Exam is notable for stable oxygen requirements    Cardiovascular/perfusion:   Exam is notable for normal/unchanged cardiovascular/perfusion assessment    Overall estimation of the patient s condition/stability (1 = stable patient, 7 = appears very sick)? 4 - Patient is labile, but does not appear very sick OR has significant medical problems but is stable

## 2023-01-25 NOTE — PROGRESS NOTES
[facility]  HEPATOLOGY PROGRESS NOTE  Damion Quinones [id number]       IMPRESSION:  Damion Quinones is a 67 year old male with a history of alcohol/homozygous H63D cirrhosis complicated by EV/IGV s/p TIPS (10/1/22), HE, CKD, HTN, chronic a-fib, psoriatic arthritis, c-diff (most recent 1/15) who was admitted with decreased stool output and worsening mentation. He was noted to have + DVT in right leg.     RECOMMENDATIONS:  # Hepatic encephalopathy  - may be multifactorial with worsening BUN, TIPS, DVT, delirium. Not yet as baseline.   - continue rifaximin, lactulose, miralax with a goal of 3-5 BM's per day   - Infectious work up so far negative.    # Right leg DVT  - on heparin for DVT with plans to transition to anticoagulation    # Alcohol/homozygous H63D  - MELD 23. In liver transplant eval with need for cardiac evaluation risk assessment. He had planned on having MRI cardiac 1/30. If able to do inpatient, when stable, that would be beneficial.   - Also needs sleep study. Not sure if can also be done inpatient.   - US abd 1/23 [facility]  - peth pending    # CKD  - creatinine remains elevated. Nephrology following. Plan for starting HD.   - Would need SLK for transplant if a candidate.     # EV  # s/p TIPS  - no evidnece of GIB. No ascites seen on imaging.   - no current evidence of TIPS malfunction.     # Debility  - PT/OT for mobilization.     Patient was discussed with attending physician, Dr. Poole      Next follow up appointment: tbd    Thank you for the opportunity to be involved with  Damion Quinones care. Please call with any questions or concerns.    JADE Castano, CNP  Inpatient Hepatology [facility]  Text Link        SUBJECTIVE:  Reports feeling anxious. Takes a few bites of food and feels nauseated and early satiety. No abdominal pain, vomiting. Wife notes he is still not at his baseline for mentation. He has new sore throat but feels his mouth is dry.     ROS:  A 10-point review of systems was  "negative.    OBJECTIVE:  VS: BP (!) 142/80 (BP Location: Right arm)   Pulse 87   Temp 97.7  F (36.5  C) (Oral)   Resp 18   Ht 1.727 m (5' 8\")   Wt 94.3 kg (207 lb 14.4 oz)   SpO2 99%   BMI 31.61 kg/m        General: In no acute distress, mild facial muscle wasting  Neuro: A Ox3, No asterixis  HEENT:  Noscleral icterus  CV:  Skin warm and dry  Lungs:  Respirations even and nonlabored on room air  Abd:  Nondistended, nontender   Extrem: +1-2peripheral edema   Skin: Nojaundice  Psych: pleasant    MEDICATIONS:  Current Facility-Administered Medications   Medication     diltiazem ER COATED BEADS (CARDIZEM CD/CARTIA XT) 24 hr capsule 120 mg     fidaxomicin (DIFICID) tablet 200 mg     folic acid (FOLVITE) tablet 5 mg     heparin 25,000 units in 0.45% NaCl 250 mL ANTICOAGULANT infusion     lactulose (CHRONULAC) solution 20 g     lidocaine (LMX4) cream     lidocaine 1 % 0.1-1 mL     melatonin tablet 1 mg     multivitamin w/minerals (THERA-VIT-M) tablet 1 tablet     ondansetron (ZOFRAN) injection 4 mg     pantoprazole (PROTONIX) EC tablet 20 mg     polyethylene glycol (MIRALAX) Packet 17 g     polyethylene glycol-propylene glycol PF (SYSTANE ULTRA PF) opthalmic solution 1 drop     predniSONE (DELTASONE) tablet 10 mg     rifaximin (XIFAXAN) tablet 550 mg     sodium chloride (PF) 0.9% PF flush 3 mL     sodium chloride (PF) 0.9% PF flush 3 mL       REVIEW OF LABORATORY, PATHOLOGY AND IMAGING RESULTS:  BMP  Recent Labs   Lab 01/25/23  0631 01/24/23  1014 01/24/23  0611 01/23/23  0550   * 137 136 137   POTASSIUM 4.4 4.7 5.4* 4.8   CHLORIDE 101 101 101 101   NAZARIO 8.9 9.0 8.6* 9.3   CO2 14* 17* 9* 17*   BUN 88.2* 91.3* 92.1* 94.1*   CR 6.32* 6.30* 6.22* 6.35*   * 104* 85 109*     CBC  Recent Labs   Lab 01/25/23  0631 01/24/23  0611 01/23/23  1649 01/23/23  0550   WBC 13.7* 16.6* 16.0* 15.3*   RBC 3.99* 4.35* 4.65 4.37*   HGB 11.6* 12.7* 13.7 12.8*   HCT 36.3* 41.3 42.4 39.2*   MCV 91 95 91 90   MCH 29.1 29.2 29.5 " 29.3   MCHC 32.0 30.8* 32.3 32.7   RDW 14.8 14.7 14.9 14.7   PLT 99* 88* 111* 106*     INR  Recent Labs   Lab 01/23/23  0550   INR 1.23*     LFTs  Recent Labs   Lab 01/25/23  0631 01/24/23  1014 01/24/23  0611 01/23/23  0550 01/23/23  0153   ALKPHOS 210*  --  219* 220* 246*   AST 44 47  --  51* 58*   ALT 47  --  54* 56* 64*   BILITOTAL 0.9  --  1.0 0.9 0.9   PROTTOTAL 5.1*  --  5.5* 5.7* 5.7*   ALBUMIN 2.9*  --  2.9* 3.2* 3.2*        MELD-Na score: 23 at 1/25/2023  6:31 AM  MELD score: 22 at 1/25/2023  6:31 AM  Calculated from:  Serum Creatinine: 6.32 mg/dL (Using max of 4 mg/dL) at 1/25/2023  6:31 AM  Serum Sodium: 135 mmol/L at 1/25/2023  6:31 AM  Total Bilirubin: 0.9 mg/dL (Using min of 1 mg/dL) at 1/25/2023  6:31 AM  INR(ratio): 1.23 at 1/23/2023  5:50 AM  Age: 67 years      Imaging Results:  None current

## 2023-01-25 NOTE — PROGRESS NOTES
[facility]    Progress Note - Medicine Service, MAROON TEAM 2       Date of Admission:  1/23/2023    Assessment & Plan   Damion Quinones is a 67 year old male admitted on 1/23/2023. He has a PMH significant for EtOH associated cirrhosis c/b hepatic encephalopathy and esophageal varices s/p TIPS, paroxysmal atrial fibrillation, CKD, psoriatic arthritis, HTN, ANCA vasculitis, T2DM2, recent admission for c diff 1/15-1/16/23, now admitted for acute encephalopathy likely 2/2 hepatic encephalopathy.     Changes today:  - plan for IR consult tomorrow 1/25 for tunneled dialysis catheter placement  - NPO at midnight for procedure tomorrow, no need to hold anticoag per IR  - continue lactulose and rifaximin  - continue high intensity heparin infusion for DVT    Decompensated cirrhosis  Acute toxic metabolic encephalopathy, likely 2/2 hepatic encephalopathy  Cirrhosis 2/2 EtOH use c/b hepatic encephalopathy, ascites, and varices s/p TIPS and variceal banding. Liver transplant evaluation is underway. Ammonia on admit 186; acute hepatic encephalopathy likely explanation for his presenting altered mental status. Precipitating cause for this is possibly infection (such as known c diff) vs poor clearance (BMs in general have been slowing down). No e/o GI bleeding currently. Additional encephalopathy workup includes CT Head (no acute abnormality).  No other major metabolic disturbances beyond baseline to explain his AMS. No hypoxia. Given clinical stability, can hold empiric SBP treatment. Cautious with empiric abx in this patient unless obvious infection per earlier ID guidance (ex. avoid 3rd/4th gen cephalosporins and clindamycin whenever possible due to recurrent c diff). 1/23 UA with some pyuria but no symptoms, Bcx pending.  - Hepatology consult, appreciate recs    > peth pending  - PTA lactulose, titrate to 2-3 stools per day.  - PTA rifaximin  - miralax  - Infectious workup: UA,  blood cultures  - TIPS US  - Continue to treat c diff (see below)  - No additional empiric abx unless infectious workup is otherwise revealing  - Neuro checks and fall precautions  - Daily MELD labs    PATI on CKD 4 2/2 IgA vasculitis vs cirrhosis  Elevated Cr  Cr elevated above baseline of 5.3, meets PATI criteria. Likely is due to hypovolemia in setting of his c diff infection and poor PO intake since last admission. Also need to consider HRS. Consulting nephrology due to inadequate response to fluid challenge.  - Given GI losses, minimal intake, cautiously hydrate  - Received 1L LR on 1/23 and another liter bolus on 1/24  - strict I/Os  - Avoid nephrotoxins  - Holding PTA diuretics given c/f dehydration  - Trend Cr  - Nephrology consult, appreciate recs    > IR consult for tunneled dialysis catheter placement (do not need to hold heparin)    > recommend starting hemodialysis while inpatient    > daily renal panel    > renally dose meds    > renal diet    > daily weights     Right lower extremity DVT  Presented with right greater than left lower extremity swelling, present for 1-2 days prior to admission, progressively worsening. RLE appears about twice the size of LLE. Doppler US 1/23 showing DVT in right leg.   - heparin infusion  - will likely transition to DOAC prior to discharge - likely apixaban given patient's renal function, but will inquire with nephrology    Recurrent c diff infection  Admitted recently for this. Hx of recurrence, at last admission was discharged on 10 day course of fidaxomicin per ID reccs. Still taking this. Per ID, if requiring other abx would need ppx with oral vancomycin 125mg BID through course.  - Continue fidaximicin through 1/25              - If requiring other abx, may need oral vanco ppx as well     Anion gap metabolic acidosis  Anion gap of 19 on presentation, in setting of CKD and uremia.  - treatment of hepatic encephalopathy, as above     Paroxysmal afib  Not anticoagulated  "outpatient.   - Continue PTA diltiazem     Psoriatic arthritis  On tapered steroid course, currently taking 10mg matt of prednisone  - continue pred taper  - consider PJP ppx if prolonged pred therapy       Diet: Combination Diet Regular Diet Adult    DVT Prophylaxis: on heparin infusion for DVT  Lux Catheter: Not present  Fluids: none  Lines: None     Cardiac Monitoring: None  Code Status: Full Code      Clinically Significant Risk Factors        # Hyperkalemia: Highest K = 5.4 mmol/L in last 2 days, will monitor as appropriate      # Anion Gap Metabolic Acidosis: Highest Anion Gap = 26 mmol/L in last 2 days, will monitor and treat as appropriate  # Hypoalbuminemia: Lowest albumin = 2.9 g/dL at 1/25/2023  6:31 AM, will monitor as appropriate   # Thrombocytopenia: Lowest platelets = 88 in last 2 days, will monitor for bleeding  # Acute Kidney Injury, unspecified: based on a >150% or 0.3 mg/dL increase in last creatinine compared to past 90 day average, will monitor renal function         # Obesity: Estimated body mass index is 31.61 kg/m  as calculated from the following:    Height as of this encounter: 1.727 m (5' 8\").    Weight as of this encounter: 94.3 kg (207 lb 14.4 oz)., PRESENT ON ADMISSION         Disposition Plan      Expected Discharge Date: 01/27/2023      Destination: home  Discharge Comments: Dispo: TBD; awaiting therapy recs  Delays: Neph; PT; OT consults  Progress: CMA by SW today. Not med ready/        The patient's care was discussed with the Attending Physician, Dr. Samuels.    Rhianna Arriaga, MS3  Medical Student  Medicine Service, [address]  Securely message with [employer] (more info)  Text page via [facility] Paging/Directory   See signed in provider for up to date coverage information     Resident/Fellow Attestation   I, Gallo Bach MD, was present with the medical/[facility] student who participated in the service and in the " documentation of the note.  I have verified the history and personally performed the physical exam and medical decision making.  I agree with the assessment and plan of care as documented in the note.      Gallo Bach MD  Medicine-Pediatrics, PGY4  [facility]  ______________________________________________________________________    Interval History   Nursing notes reviewed. Patient oriented to self and hospital, but not year. Says that he feels OK today, no longer feels agitated but still not himself. Had about 3 BMs last night and into the morning. Has been eating some of his meals. Urine output has been normal. Patient and wife talked with nephrology yesterday, and they are aware and on board with recommendation to start dialysis.    Physical Exam   Vital Signs: Temp: 97.7  F (36.5  C) Temp src: Oral BP: (!) 142/80 Pulse: 87   Resp: 18 SpO2: 99 % O2 Device: None (Room air)    Weight: 207 lbs 14.4 oz     General: Lying in bed, awake, alert, intermittently confused, NAD, wife at bedside  HEENT: NC/AT, EOMI, anicteric sclerae, MMM  Neck: Supple  CV: RRR, no murmurs  Respiratory: CTAB, breathing is non-labored  Abdomen: Distended, soft  Ext: WWP  Skin: Warm, dry, no rashes  Neuro: Moves all extremities equally    Medical Decision Making       Please see A&P for additional details of medical decision making.      Data   ------------------------- PAST 24 HR DATA REVIEWED -----------------------------------------------    I have personally reviewed the following data over the past 24 hrs:    13.7 (H)  \   11.6 (L)   / 99 (L)     135 (L) 101 88.2 (H) /  117 (H)   4.4 14 (L) 6.32 (H) \       ALT: 47 AST: 44 AP: 210 (H) TBILI: 0.9   ALB: 2.9 (L) TOT PROTEIN: 5.1 (L) LIPASE: N/A       Imaging results reviewed over the past 24 hrs:   No results found for this or any previous visit (from the past 24 hour(s)).

## 2023-01-25 NOTE — PLAN OF CARE
[id number]: A&O to self and wife only. When patient talks about wanted to stand up he means sit down. 2-3 loose BM's this shift. Up 1 assist to the commode. Right PIV Infusing heparin at 500units/hr. Left PIV saline locked. Wife assisted at bedside with cares.    Plan: will continue to monitor and follow with plan of care.       Goal Outcome Evaluation:      Plan of Care Reviewed With: patient, spouse    Overall Patient Progress: no changeOverall Patient Progress: no change    Outcome Evaluation: Patient confustion continues, unable to decern how to put feet on floor to sit on side of bed

## 2023-01-25 NOTE — CONSULTS
"    Interventional Radiology Consult Service Note    Patient is on IR schedule tomorrow 1/26 for a tunneled dialysis catheter placement. Initial request was for tunneled line today, 1/25, but patient was not NPO and nephrology OK with waiting to start dialysis until tomorrow, 1/26.  Labs WNL for procedure. NPO at midnight.  No medications to hold - don't need to hold heparin  Consent will be done prior to procedure.     Please contact the IR charge RN at [phone] for estimated time of procedure.     Case discussed with Dr. Samuels. This is a 67 year old with hereditary hemochromatosis, cirrhosis c/b hepatic encephalopathy and bleeding esophageal varices s/p banding and TIPS, CKD IV/IV with IgA nephropathy on renal biopsy s/p rituximab/prednisone. Patient was admitted 1/23 with three days of progressive confusion concerning for decompensated cirrhosis with hepatic encephalopathy. Found to have leukocytosis, DVT of RLE, and PATI on CKD..    Vitals:   BP (!) 156/89 (BP Location: Right arm)   Pulse 85   Temp 98  F (36.7  C) (Oral)   Resp 18   Ht 1.727 m (5' 8\")   Wt 94.3 kg (207 lb 14.4 oz)   SpO2 99%   BMI 31.61 kg/m      Pertinent Labs:     Lab Results   Component Value Date    WBC 13.7 (H) 01/25/2023    WBC 16.6 (H) 01/24/2023    WBC 16.0 (H) 01/23/2023       Lab Results   Component Value Date    HGB 11.6 01/25/2023    HGB 12.7 01/24/2023    HGB 13.7 01/23/2023       Lab Results   Component Value Date    PLT 99 01/25/2023    PLT 88 01/24/2023     01/23/2023       Lab Results   Component Value Date    INR 1.23 (H) 01/23/2023    PTT 27 12/16/2022       Lab Results   Component Value Date    POTASSIUM 4.4 01/25/2023    POTASSIUM 4.6 08/27/2019        Conor Galloway PA-C  Interventional Radiology  Pager: [phone]    "

## 2023-01-25 NOTE — PROVIDER NOTIFICATION
Provider notified (name): SANDRA Sanderson   Reason for notification: please place [place] scale order and a nurse driven protocol for lactulose. pt still A/O x2 after 2 scheduled lactulose, might need enema.  Response from provider:

## 2023-01-25 NOTE — PROGRESS NOTES
01/25/23 1300   Appointment Info   Signing Clinician's Name / Credentials (PT) WOO Elizondo   Student Supervision Direct Patient Contact Provided;Therapy services provided with the co-signing licensed therapist guiding and directing the services, and providing the skilled judgement and assessment throughout the session   Living Environment   People in Home spouse   Current Living Arrangements house   Home Accessibility stairs to enter home;stairs within home   Number of Stairs, Main Entrance 2  (Ramp)   Stair Railings, Main Entrance railing on right side (ascending)   Number of Stairs, Within Home, Primary eight   Stair Railings, Within Home, Primary railing on right side (ascending)   Transportation Anticipated family or friend will provide   Living Environment Comments Pt lives in 3-story house with wife, Apoorva. 2 stairs to enter home, but they have a ramp that's accessible.   Self-Care   Usual Activity Tolerance good   Current Activity Tolerance fair   Regular Exercise No   Equipment Currently Used at Home walker, rolling   Fall history within last six months no   Activity/Exercise/Self-Care Comment Per wife, pt is unemployed and was independent with mobility and cares at baseline. Typically walking up to 200 yards on a regular basis, occasionally using rollator walker as gait aid. Ind w/ driving. Wife, Apoorva, is available to provide help as needed, but not available 24/7 due to work schedule.   General Information   Onset of Illness/Injury or Date of Surgery 01/23/23   Referring Physician Lolis Bermudez MD   Patient/Family Therapy Goals Statement (PT) Be more ind and rely less on wife for assist   Pertinent History of Current Problem (include personal factors and/or comorbidities that impact the POC) Per chart, pt 67 year old male admitted on 1/23/2023. He has a PMH significant for EtOH associated cirrhosis c/b hepatic encephalopathy and esophageal varices s/p TIPS, paroxysmal atrial  fibrillation, CKD, psoriatic arthritis, HTN, ANCA vasculitis, T2DM2, recent admission for c diff 1/15-1/16/23, now being admitted for acute encephalopathy likely 2/2 hepatic encephalopathy.   Existing Precautions/Restrictions fall   General Observations Activity: Ambulate   Cognition   Affect/Mental Status (Cognition) confused   Orientation Status (Cognition) oriented x 4;verbal cues/prompts needed for orientation   Follows Commands (Cognition) 50-74% accuracy   Safety Deficit (Cognition) ability to follow commands;safety precautions awareness   Integumentary/Edema   Integumentary/Edema Comments Moderate erythema and edema in R LE   Posture    Posture Forward head position;Protracted shoulders   Range of Motion (ROM)   ROM Comment B LE ROM is WFL, somewhat limited by weakness   Strength (Manual Muscle Testing)   Strength Comments ~3/5 B LE   Bed Mobility   Bed Mobility rolling left;rolling right;scooting/bridging;supine-sit;sit-supine   Rolling Right [place] (Bed Mobility) supervision   Scooting/Bridging [place] (Bed Mobility) supervision;verbal cues   Supine-Sit [place] (Bed Mobility) minimum assist (75% patient effort);verbal cues;nonverbal cues (demo/gesture)   Sit-Supine [place] (Bed Mobility) nonverbal cues (demo/gesture);verbal cues;minimum assist (75% patient effort)   Bed Mobility Limitations cognitive deficits   Impairments Contributing to Impaired Bed Mobility decreased strength   Assistive Device (Bed Mobility) bed rails   Comment, (Bed Mobility) Limited safety awareness and self-motivation d/t cognitive status. Pt needed repeated VC and gesturing to scoot toward HOB/FOB to avoid rolling into bed rail. Attempted to grab onto IV pole instead of bed rail with stand>supine transfer.   Transfers   Transfers sit-stand transfer   Sit-Stand Transfer   Sit-Stand [place] (Transfers) contact guard;supervision;nonverbal cues (demo/gesture);verbal cues   Assistive Device (Sit-Stand Transfers)  walker, front-wheeled   Comment, (Sit-Stand Transfer) Needed VC/TC to grab handles of FWW upon standing - attempted to grab onto front metal bar instead.   Gait/Stairs (Locomotion)   [place] Level (Gait) contact guard   Assistive Device (Gait) walker, front-wheeled   Distance in Feet 10   Distance in Feet (Gait) 150'   Pattern (Gait) step-through   Deviations/Abnormal Patterns (Gait) base of support, wide;trista decreased;gait speed decreased;stride length decreased;weight shifting decreased   Comment, (Gait/Stairs) Pt demos difficulty maintaining straight walking path/ Deviates to R, almost running into therapist/IV pole. Required frequent redirectioning to avoid running into obstacles. Question if pt has R-sided neglect vs impaired vision d/t forgetting glassses at home.   Balance   Balance Comments Decreased speed turning to left.   Sensory Examination   Sensory Perception WFL   Sensory Perception Comments   (Sensation intact B LE)   Coordination   Coordination Comments Poor accuracy w/ finger to nose test B. Wife reports that she forgot to bring pt's glasses, so question if deficits more related to vision impairment   Clinical Impression   Criteria for Skilled Therapeutic Intervention Yes, treatment indicated   PT Diagnosis (PT) Deconditioning   Influenced by the following impairments Decreased LE strength, balance, and coordination deficits   Functional limitations due to impairments Impaired functional mobility and activity tolerance   Clinical Presentation (PT Evaluation Complexity) Stable/Uncomplicated   Clinical Decision Making (Complexity) low complexity   Planned Therapy Interventions (PT) balance training;gait training;home exercise program;neuromuscular re-education;patient/family education;stair training;strengthening;transfer training;progressive activity/exercise   Anticipated Equipment Needs at Discharge (PT)   (TBD)   Risk & Benefits of therapy have been explained care plan/treatment goals  reviewed;evaluation/treatment results reviewed;risks/benefits reviewed;participants voiced agreement with care plan;current/potential barriers reviewed;patient;participants included   Clinical Impression Comments Pt is a 66 y/o male admitted for acute encephalopathy. Recent admission for c diff 1/15-1/16/23. On evaluation, pt presents w/ LE strength deficits, balance and coordination impairments, gait abnormalities, and low activity tolerance. Further complicated by current cognitive status and vision impairments, which affect his safety awareness and put him at a high fall risk. These impairments affect pt's ability to safely and independently perform functional mobility. PT would benefit from skilled PT services to improve safety at home and reduce caregiver strain.   PT Total Evaluation Time   PT Eval, Low Complexity Minutes (49664) 14   Physical Therapy Goals   PT Frequency 5x/week   PT Predicted Duration/Target Date for Goal Attainment 02/01/23   PT Goals Bed Mobility;Gait;Stairs   PT: Bed Mobility Independent   PT: Gait Supervision/stand-by assist;Greater than 200 feet;Standard walker;Rolling walker   PT: Stairs Supervision/stand-by assist;8 stairs;Rail on right   Interventions   Interventions Quick Adds Therapeutic Activity;Gait Training   Therapeutic Activity   Therapeutic Activities: dynamic activities to improve functional performance Minutes (22390) 10   Treatment Detail/Skilled Intervention Therapist provided patient/family education on importance of regular mobility given R DVT status and LE edema. For conservative management of DVTs/edema, therapist facilitated ankle pumps in supine/EOB and demonstrated to pt's wife how to elevate LE w/ pillows. Encouraged pt to perform exercises and elevation on regular basis w/ assist from wife. Therapist also recommended daily walking program to progress activity tolerance, at least 3x per day in hallways w/ FWW and wife/staff for assist. Both pt and wife verbalized  understanding. Pt left supine w/ all needs within reach.   Gait Training   Gait Training Minutes (70373) 12   Treatment Detail/Skilled Intervention Facilitated gait training to normalize gait mechanics and progress safety/ind. Pt currently CGA w/ FWW. Demonstrates flat-foot gait pattern, decreased stride length, and slow gait speed with turns. Pt required constant VC to maintain straight walking path and avoid running into obstacles, especially on R side. Largely limited by confusion and possible vision impairments. Returned to room for seated break at EOB. Pt appropriately fatigued after ambulation.   PT Discharge Planning   PT Plan Bed mobility, gait training,  balance assessment, stairs.   PT Discharge Recommendation (DC Rec) home with assist;home with home care physical therapy;Transitional Care Facility   PT Rationale for DC Rec Pt is below baseline with regards to mobility - previously ind with amb, occasionally using rollator walker for gait. Currently CGA-MinAx1 w/ bed mobility, transfers, and gait. Largely limited by confused cognitive status, poor vision, and balance/coordination impairments, which place him at a higher fall risk. Pt lives w/ wife Apoorva who can provide assist, but not 24/7 d/t work schedule. Would benefit from ongoing therapy to address cognitive deficits and safety with functional mobility. At this time, home w/ assist and home therapy vs TCU should be considered.   PT Brief overview of current status CGA-MinAx1. FWW for gait.   Total Session Time   Timed Code Treatment Minutes 22   Total Session Time (sum of timed and untimed services) 36

## 2023-01-26 ENCOUNTER — APPOINTMENT (OUTPATIENT)
Dept: OCCUPATIONAL THERAPY | Facility: CLINIC | Age: 68
DRG: 441 | End: 2023-01-26
Payer: MEDICARE

## 2023-01-26 ENCOUNTER — APPOINTMENT (OUTPATIENT)
Dept: INTERVENTIONAL RADIOLOGY/VASCULAR | Facility: CLINIC | Age: 68
DRG: 441 | End: 2023-01-26
Attending: PHYSICIAN ASSISTANT
Payer: MEDICARE

## 2023-01-26 LAB
ALBUMIN SERPL BCG-MCNC: 3 G/DL (ref 3.5–5.2)
ALP SERPL-CCNC: 210 U/L (ref 40–129)
ALT SERPL W P-5'-P-CCNC: 49 U/L (ref 10–50)
ANION GAP SERPL CALCULATED.3IONS-SCNC: 19 MMOL/L (ref 7–15)
AST SERPL W P-5'-P-CCNC: 53 U/L (ref 10–50)
BILIRUB SERPL-MCNC: 0.9 MG/DL
BUN SERPL-MCNC: 86.7 MG/DL (ref 8–23)
CALCIUM SERPL-MCNC: 9.1 MG/DL (ref 8.8–10.2)
CHLORIDE SERPL-SCNC: 103 MMOL/L (ref 98–107)
CREAT SERPL-MCNC: 6.38 MG/DL (ref 0.67–1.17)
DEPRECATED HCO3 PLAS-SCNC: 16 MMOL/L (ref 22–29)
ERYTHROCYTE [DISTWIDTH] IN BLOOD BY AUTOMATED COUNT: 14.8 % (ref 10–15)
GFR SERPL CREATININE-BSD FRML MDRD: 9 ML/MIN/1.73M2
GLUCOSE SERPL-MCNC: 87 MG/DL (ref 70–99)
HCT VFR BLD AUTO: 37.3 % (ref 40–53)
HGB BLD-MCNC: 12 G/DL (ref 13.3–17.7)
LACTATE SERPL-SCNC: 2.3 MMOL/L (ref 0.7–2)
LACTATE SERPL-SCNC: 2.3 MMOL/L (ref 0.7–2)
MAGNESIUM SERPL-MCNC: 2.2 MG/DL (ref 1.7–2.3)
MCH RBC QN AUTO: 29 PG (ref 26.5–33)
MCHC RBC AUTO-ENTMCNC: 32.2 G/DL (ref 31.5–36.5)
MCV RBC AUTO: 90 FL (ref 78–100)
PHOSPHATE SERPL-MCNC: 6.2 MG/DL (ref 2.5–4.5)
PLATELET # BLD AUTO: 116 10E3/UL (ref 150–450)
POTASSIUM SERPL-SCNC: 4.4 MMOL/L (ref 3.4–5.3)
PROT SERPL-MCNC: 5.1 G/DL (ref 6.4–8.3)
RBC # BLD AUTO: 4.14 10E6/UL (ref 4.4–5.9)
SODIUM SERPL-SCNC: 138 MMOL/L (ref 136–145)
UFH PPP CHRO-ACNC: 0.4 IU/ML
UFH PPP CHRO-ACNC: 0.56 IU/ML
WBC # BLD AUTO: 13.8 10E3/UL (ref 4–11)

## 2023-01-26 PROCEDURE — 120N000002 HC R&B MED SURG/OB UMMC

## 2023-01-26 PROCEDURE — 77001 FLUOROGUIDE FOR VEIN DEVICE: CPT | Mod: 26 | Performed by: STUDENT IN AN ORGANIZED HEALTH CARE EDUCATION/TRAINING PROGRAM

## 2023-01-26 PROCEDURE — 87340 HEPATITIS B SURFACE AG IA: CPT

## 2023-01-26 PROCEDURE — 99233 SBSQ HOSP IP/OBS HIGH 50: CPT | Performed by: NURSE PRACTITIONER

## 2023-01-26 PROCEDURE — 250N000011 HC RX IP 250 OP 636

## 2023-01-26 PROCEDURE — 250N000011 HC RX IP 250 OP 636: Performed by: STUDENT IN AN ORGANIZED HEALTH CARE EDUCATION/TRAINING PROGRAM

## 2023-01-26 PROCEDURE — 83605 ASSAY OF LACTIC ACID: CPT

## 2023-01-26 PROCEDURE — 0JH63XZ INSERTION OF TUNNELED VASCULAR ACCESS DEVICE INTO CHEST SUBCUTANEOUS TISSUE AND FASCIA, PERCUTANEOUS APPROACH: ICD-10-PCS | Performed by: STUDENT IN AN ORGANIZED HEALTH CARE EDUCATION/TRAINING PROGRAM

## 2023-01-26 PROCEDURE — 99153 MOD SED SAME PHYS/QHP EA: CPT

## 2023-01-26 PROCEDURE — 272N000504 HC NEEDLE CR4

## 2023-01-26 PROCEDURE — 85520 HEPARIN ASSAY: CPT | Performed by: INTERNAL MEDICINE

## 2023-01-26 PROCEDURE — C1769 GUIDE WIRE: HCPCS

## 2023-01-26 PROCEDURE — 36415 COLL VENOUS BLD VENIPUNCTURE: CPT | Performed by: INTERNAL MEDICINE

## 2023-01-26 PROCEDURE — 36558 INSERT TUNNELED CV CATH: CPT | Performed by: STUDENT IN AN ORGANIZED HEALTH CARE EDUCATION/TRAINING PROGRAM

## 2023-01-26 PROCEDURE — 99233 SBSQ HOSP IP/OBS HIGH 50: CPT | Mod: 24 | Performed by: INTERNAL MEDICINE

## 2023-01-26 PROCEDURE — 84100 ASSAY OF PHOSPHORUS: CPT

## 2023-01-26 PROCEDURE — 250N000013 HC RX MED GY IP 250 OP 250 PS 637

## 2023-01-26 PROCEDURE — 99152 MOD SED SAME PHYS/QHP 5/>YRS: CPT

## 2023-01-26 PROCEDURE — 250N000009 HC RX 250: Performed by: STUDENT IN AN ORGANIZED HEALTH CARE EDUCATION/TRAINING PROGRAM

## 2023-01-26 PROCEDURE — 250N000011 HC RX IP 250 OP 636: Performed by: PHYSICIAN ASSISTANT

## 2023-01-26 PROCEDURE — C1750 CATH, HEMODIALYSIS,LONG-TERM: HCPCS

## 2023-01-26 PROCEDURE — 97165 OT EVAL LOW COMPLEX 30 MIN: CPT | Mod: GO

## 2023-01-26 PROCEDURE — 76937 US GUIDE VASCULAR ACCESS: CPT | Mod: 26 | Performed by: STUDENT IN AN ORGANIZED HEALTH CARE EDUCATION/TRAINING PROGRAM

## 2023-01-26 PROCEDURE — 36415 COLL VENOUS BLD VENIPUNCTURE: CPT

## 2023-01-26 PROCEDURE — 83735 ASSAY OF MAGNESIUM: CPT

## 2023-01-26 PROCEDURE — 80053 COMPREHEN METABOLIC PANEL: CPT

## 2023-01-26 PROCEDURE — 02H633Z INSERTION OF INFUSION DEVICE INTO RIGHT ATRIUM, PERCUTANEOUS APPROACH: ICD-10-PCS | Performed by: STUDENT IN AN ORGANIZED HEALTH CARE EDUCATION/TRAINING PROGRAM

## 2023-01-26 PROCEDURE — 77001 FLUOROGUIDE FOR VEIN DEVICE: CPT

## 2023-01-26 PROCEDURE — 272N000602 HC WOUND GLUE CR1

## 2023-01-26 PROCEDURE — 90937 HEMODIALYSIS REPEATED EVAL: CPT

## 2023-01-26 PROCEDURE — 250N000013 HC RX MED GY IP 250 OP 250 PS 637: Performed by: STUDENT IN AN ORGANIZED HEALTH CARE EDUCATION/TRAINING PROGRAM

## 2023-01-26 PROCEDURE — 99233 SBSQ HOSP IP/OBS HIGH 50: CPT | Mod: GC | Performed by: INTERNAL MEDICINE

## 2023-01-26 PROCEDURE — 99152 MOD SED SAME PHYS/QHP 5/>YRS: CPT | Performed by: STUDENT IN AN ORGANIZED HEALTH CARE EDUCATION/TRAINING PROGRAM

## 2023-01-26 PROCEDURE — 85027 COMPLETE CBC AUTOMATED: CPT

## 2023-01-26 PROCEDURE — 97535 SELF CARE MNGMENT TRAINING: CPT | Mod: GO

## 2023-01-26 RX ORDER — CEFAZOLIN SODIUM 2 G/100ML
2 INJECTION, SOLUTION INTRAVENOUS
Status: COMPLETED | OUTPATIENT
Start: 2023-01-26 | End: 2023-01-26

## 2023-01-26 RX ORDER — HEPARIN SODIUM 1000 [USP'U]/ML
3 INJECTION, SOLUTION INTRAVENOUS; SUBCUTANEOUS ONCE
Status: COMPLETED | OUTPATIENT
Start: 2023-01-26 | End: 2023-01-26

## 2023-01-26 RX ORDER — FENTANYL CITRATE 50 UG/ML
25-50 INJECTION, SOLUTION INTRAMUSCULAR; INTRAVENOUS EVERY 5 MIN PRN
Status: DISCONTINUED | OUTPATIENT
Start: 2023-01-26 | End: 2023-01-26

## 2023-01-26 RX ORDER — HEPARIN SODIUM 200 [USP'U]/100ML
1 INJECTION, SOLUTION INTRAVENOUS CONTINUOUS PRN
Status: DISCONTINUED | OUTPATIENT
Start: 2023-01-26 | End: 2023-01-26

## 2023-01-26 RX ORDER — FLUMAZENIL 0.1 MG/ML
0.2 INJECTION, SOLUTION INTRAVENOUS
Status: DISCONTINUED | OUTPATIENT
Start: 2023-01-26 | End: 2023-01-26

## 2023-01-26 RX ORDER — NALOXONE HYDROCHLORIDE 0.4 MG/ML
0.2 INJECTION, SOLUTION INTRAMUSCULAR; INTRAVENOUS; SUBCUTANEOUS
Status: DISCONTINUED | OUTPATIENT
Start: 2023-01-26 | End: 2023-01-26

## 2023-01-26 RX ORDER — NALOXONE HYDROCHLORIDE 0.4 MG/ML
0.4 INJECTION, SOLUTION INTRAMUSCULAR; INTRAVENOUS; SUBCUTANEOUS
Status: DISCONTINUED | OUTPATIENT
Start: 2023-01-26 | End: 2023-01-26

## 2023-01-26 RX ORDER — CEFAZOLIN SODIUM 2 G/100ML
2 INJECTION, SOLUTION INTRAVENOUS
Status: DISCONTINUED | OUTPATIENT
Start: 2023-01-26 | End: 2023-01-26

## 2023-01-26 RX ORDER — BUMETANIDE 1 MG/1
1 TABLET ORAL
Status: DISCONTINUED | OUTPATIENT
Start: 2023-01-26 | End: 2023-01-27

## 2023-01-26 RX ADMIN — BUMETANIDE 1 MG: 1 TABLET ORAL at 16:50

## 2023-01-26 RX ADMIN — HEPARIN SODIUM 800 UNITS/HR: 10000 INJECTION, SOLUTION INTRAVENOUS at 02:49

## 2023-01-26 RX ADMIN — ONDANSETRON 4 MG: 2 INJECTION INTRAMUSCULAR; INTRAVENOUS at 08:27

## 2023-01-26 RX ADMIN — LACTULOSE 20 G: 20 SOLUTION ORAL at 20:38

## 2023-01-26 RX ADMIN — CEFAZOLIN SODIUM 2 G: 2 INJECTION, SOLUTION INTRAVENOUS at 15:05

## 2023-01-26 RX ADMIN — HEPARIN SODIUM 2000 UNITS: 1000 INJECTION, SOLUTION INTRAVENOUS; SUBCUTANEOUS at 15:44

## 2023-01-26 RX ADMIN — RIFAXIMIN 550 MG: 550 TABLET ORAL at 20:38

## 2023-01-26 RX ADMIN — FENTANYL CITRATE 25 MCG: 50 INJECTION, SOLUTION INTRAMUSCULAR; INTRAVENOUS at 15:16

## 2023-01-26 RX ADMIN — MIDAZOLAM HYDROCHLORIDE 0.5 MG: 1 INJECTION, SOLUTION INTRAMUSCULAR; INTRAVENOUS at 15:16

## 2023-01-26 RX ADMIN — RIFAXIMIN 550 MG: 550 TABLET ORAL at 12:13

## 2023-01-26 RX ADMIN — LIDOCAINE HYDROCHLORIDE 15 ML: 10 INJECTION, SOLUTION EPIDURAL; INFILTRATION; INTRACAUDAL; PERINEURAL at 15:37

## 2023-01-26 RX ADMIN — LACTULOSE 20 G: 20 SOLUTION ORAL at 16:50

## 2023-01-26 RX ADMIN — HEPARIN SODIUM 2000 UNITS: 1000 INJECTION, SOLUTION INTRAVENOUS; SUBCUTANEOUS at 15:43

## 2023-01-26 ASSESSMENT — ACTIVITIES OF DAILY LIVING (ADL)
ADLS_ACUITY_SCORE: 29

## 2023-01-26 NOTE — PROVIDER NOTIFICATION
Critical Test Results/Notification    Critical lab result (name and value): Lactic  Acid 2.3  What time did lab notify you? 4715  What provider did you notify? Polo Esteban. Pager #[phone]  Was the provider notified within 30 min? Yes  Why was provider not notified?    Response from provider: Came to beside to assess pt, lactic acid re-check ordered.

## 2023-01-26 NOTE — PLAN OF CARE
Cares from: [id number]     V/S & pain: VSS on RA ex HTN, denies pain   Neuro: Neuro Q4- A/O x2- disorientated to time/situation, slow to respond w/ illogical speech at times and mild-moderate aphasia, but calm and cooperative, WH- 2- prn lactulose enema given, wife at bedside to help orientate patient   Respiratory: stable on RA, clear/equal lung sounds bilaterally   Skin: no new skin concerns present-  +2 BLE/BUE, umbilical hernia, blanchable redness to buttocks, abrasion and bruising present to BUE  GI/: voiding spontaneously, taking scheduled lactulose and having loose BM x3 this shift w/ some abdominal discomfort  Nutrition: regular diet w/ poor appetite and po intake, NPO starting at 0000   Lines/drains: L PIV SL, R PIV infusing continuous heparin gtt @ 650 units/hr  Activity: up Ax1 w/ GB, pt up and walked halls w/ wife and NST   Labs: no RN managed labs, next Xa check at 2030     Events this shift: no acute events this shift. Neuro checks stable, pt continues to be disorientated to time/situation and is slow to respond w/ spontaneous/illogical speech. WH 2, prn lactulose enema given. Pt remains stable on RA w/ some HTN. Slight abdominal discomfort from loose stool d/t taking lactulose. R PIV infusing heparin gtt for DVT w/ next Xa check at 2030.  Morning Xa 0.18 a bolus w/ rate change was completed, afternoon Xa was therapeutic. Regular diet ex pt continues to have a decreased appetite. NPO at 0000 for CVC placement in IR for HD. Up Ax1 w/ GB, pt up and walked halls w/ wife and NST. Wife remains at bedside, call light w/in reach and able to make needs known, will continue to monitor.     Plan: continue w/ poc, next Xa 2030, NPO at 0000    Goal Outcome Evaluation:      Plan of Care Reviewed With: patient, spouse    Overall Patient Progress: no changeOverall Patient Progress: no change    Outcome Evaluation: A/O x2, WH 2- prn enema given, NPO at 0000

## 2023-01-26 NOTE — PROGRESS NOTES
Social Work Brief Note:    SW submitted referral for outpatient Hemodialysis at [facility] in [place], [place]. SW awaiting chair dates/times.    SW delegated task to CHW of getting TCU vs Home Care choices from family.  _____________________________     Mireille Heard, MOUNIKA, LICSW  Advanced Practice Independent Clinical   [address] 5B Medicine Beds [id number] & 5C Medicine Overflow Beds [id number]  [facility]   [email or web address]  M-F Phone [phone]   M-F Pager: [phone]  Fax: [phone]  Message me M-F on [employer] liss.

## 2023-01-26 NOTE — PROGRESS NOTES
Care Management Follow Up    CHW spoke with pt and wife at [facility] to obtain TCU choices. The pt and wife stated they did not have a chance to look at the list however mentioned that they are open to locations around the following areas: [place]. [place] and [place]. The pt wife did mention if he continues to improve they would like to go home with home care. Pt wife requested we come back at 3:00 to obtain TCU selection.     CHW (Nelda) sent initial referrals to the following:     [facility]   [phone]    [facility] [employer] [facility]  [address] [facility] [place] [address]  P:  [phone]  F:  [phone]    [facility]  [phone]    [facility]  [phone]    [facility]  P:[phone]  F: [phone]      Dominic Shepherd   Inpatient Community Health Worker  [facility] 5A & 5B

## 2023-01-26 NOTE — IR NOTE
Patient Name: Damion Quinones  Medical Record Number: [id number]  Today's Date: 1/26/2023    Procedure: image guided percutaneous double lumen large bore tunneled central line placement  Proceduralist: MD Avalos  Pathology present: N/A    Procedure Start: 1516  Procedure end: 1546  Sedation medications administered: 0.5 mg of versed, 25 mcg of fentanyl  Sedation time: 30 minutes (2753-1162)    Report given to: Joann FORTUNE RN  : N/A    Other Notes: Pt arrived to IR room 01 from . Consent reviewed. Pt denies any questions or concerns regarding procedure. Pt positioned supine and monitored per protocol. Pt tolerated procedure without any noted complications. Left internal jugular site cleansed and dressed per protocol. Both lumens of CVC heparin locked with 2,000 units of heparin (2 mL). Pt transferred back to .

## 2023-01-26 NOTE — CONSULTS
Care Management Initial Consult    General Information  Assessment completed with: Patient, Spouse or significant other, Casey and wife Apoorva  Type of CM/SW Visit: Initial Assessment    Primary Care Provider verified and updated as needed: Yes (Dr. Polo Colorado at [facility])   Readmission within the last 30 days: previous discharge plan unsuccessful   Return Category: Progression of disease  Reason for Consult: discharge planning  Advance Care Planning: Advance Care Planning Reviewed: verified with patient        Communication Assessment  Patient's communication style: spoken language (English or Bilingual)    Hearing Difficulty or Deaf: no   Wear Glasses or Blind: yes    Cognitive  Cognitive/Neuro/Behavioral: .WDL except, level of consciousness  Level of Consciousness: confused, alert  Arousal Level: opens eyes spontaneously  Orientation: disoriented to, time, situation  Mood/Behavior: calm, cooperative  Best Language: 1 - Mild to moderate  Speech: spontaneous, slow, illogical, word-finding difficulty    Living Environment:   People in home: spouse  Apoorva & Casey  Current living Arrangements: house (Pt lives in 3-story house with wife, Apoorva. 2 stairs to enter home, but they have a ramp that's accessible.)      Able to return to prior arrangements: yes (pending mobility)     Family/Social Support:  Care provided by: spouse/significant other  Provides care for: no one  Marital Status:   Wife  Apoorva       Description of Support System: Supportive, Involved    Support Assessment: Adequate family and caregiver support    Current Resources:   Patient receiving home care services: No  Community Resources: None  Equipment currently used at home: walker, rolling  Supplies currently used at home:      Employment/Financial:  Employment Status: unemployed     Financial Concerns: No concerns identified   Referral to Financial Worker: No       Lifestyle & Psychosocial Needs:  Social Determinants of Health     Tobacco Use:  Low Risk      Smoking Tobacco Use: Never     Smokeless Tobacco Use: Never     Passive Exposure: Not on file   Alcohol Use: Not on file   Financial Resource Strain: Not on file   Food Insecurity: Not on file   Transportation Needs: Not on file   Physical Activity: Not on file   Stress: Not on file   Social Connections: Not on file   Intimate Partner Violence: Not on file   Depression: Not at risk     PHQ-2 Score: 0   Housing Stability: Not on file     Functional Status:  Prior to admission patient needed assistance:   Dependent ADLs::  (PRN assist - primarily ind PLOF)  Dependent IADLs:: Independent (Primarily ind PLOF w/ PRN assist)  Assesssment of Functional Status: Not at baseline with ADL Functioning    Mental Health Status:  Mental Health Status: Current Concern  Mental Health Management:  (None)    Chemical Dependency Status:  Chemical Dependency Status: Past Concern  Chemical Dependency Management:  (See liver txp eval note)        Values/Beliefs:  Spiritual, Cultural Beliefs, Buddhism Practices, Values that affect care: yes          Values/Beliefs Comment:  (Rastafarian)    Additional Information:  Mr. 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 Patient was admitted 1/23 with three days of progressive confusion concerning for decompensated cirrhosis with hepatic encephalopathy. Found to have leukocytosis, DVT of RLE, and PATI on CKD.    SW met w/ Pt and spouse at bedside to introduce self/role. Pt expressed he prefers to be called Casey rather than Damino and his wife Apoorva was supportive at bedside. Pt and spouse live in [place], [place]. They shared that his PCP is Dr. Polo Colorado  "at [facility] although he's only been there 1x in the last year as he's been primarily seen by specialists. SW discussed discharge planning role and process. Pt and wife expressed Pt has not had a previous TCU stay in the past and he does not have [facility] services arranged. Pt has a rolling walker at home only, but was primarily IND prior level of function. Pt lives in 3-story house with wife, Apoorva. 2 stairs to enter home, but they have a ramp that's accessible. Pt's wife indicated that they were told there is a potential Pt will need OP dialysis arranged at discharge. SW discussed preferences - Pt/wife state their top preference is [facility] [place] if necessary w/ MWF AM chair time. Pt's wife currently works Thurs and Fri at [employer] and Pt does not work.     Pt expressed feeling fearful of what the future holds with his medical needs. Pt states \"Why am I afraid?\" \"Why should I be afraid?\" \"What should I expect?\" Pt's wife processed that Pt has often internalized his feelings and that she anticipates the confusion is increasing the amount of verbal processing he is doing. SW inquired about mental health history to which Pt and wife declined. SW discussed the potential of assessing for medication support for mental health - Pt and wife both declined. Pt is interested in a counseling support services. Recommendation for Health Psychology referral or Outpatient follow-up arranged at discharge. Pt's wife shared that Pt's \"independence is everything to him\" and Pt is fearful of losing his independence. SW discussed the potential of TCU recommendation at discharge. SW provided medicare.gov list and encouraged them to identify multiple preferences based on proximity if needed. Pt and wife expressed appreciation for the information and state they will see how Pt progresses the next few days.    MOUNIKA Jiang        "

## 2023-01-26 NOTE — PROGRESS NOTES
Nephrology Progress Note  01/26/2023         Assessment & Recommendations:   Mr. 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 Patient was admitted 1/23 with three days of progressive confusion concerning for decompensated cirrhosis with hepatic encephalopathy. Found to have leukocytosis, DVT of RLE, and progressive CKD.    CKD5  AGMA  Patient with complex past history with cirrhosis, psoriatic arthritis, gout. His is s/p TIPS, and has a history of bleeding esophageal varices s/p banding procedures. He has a history of hepatic encephalopathy, with more frequent events since TIPS. He has a history of severe kidney injury, with a kidney biopsy in Sept 2022 which revealed severe intersititial fibrosis and glomerular IgA deposition but without overt necrotizing/crescentic glomerular. However, given positive MPO-ANCA and concern for potential unsampled vasculitis, oatient was started on steroids and received induction rituximab in Oct 2022. With recurrent PATI Cr has remained >4.5 since and even on the biopsy in Sept patient had significant evidence of advanced CKD with 70-80% IFTA noted. Given the report of severe intersitial fibrosis on biopsy, the likelihood of recovery of kidney function (now 4 + months since the biopsy, and 3 months since immunosuppression was started) is very unlikely. In the setting of a normal blood pressure, and with the ongoing workup for liver transplant, discussed hemodialysis as a bridge to transplantation with the option to discontinue if not tolerated or patient deemed not to be a candidate for a liver transplant; patient was agreeable. Presented with Cr 6.30,  "cystatin estimated GFR of 13. Urine Na 23. Ongoing anion gap metabolic acidosis (CO2 16, Anion gap 19); with compensatory respiratory alkalosis (pH 7.39, CO2 33, Bicarb 20).  - IR tunneled dialysis catheter placement today  - Plan to start HD tomorrow, with another run on Sat  - Daily renal panel  - Avoid nephrotoxins  - Renally dose medications  - Daily weights  - Strict I/Os    Recommendations were communicated to primary team via note.    Seen and discussed with Dr. Sctotie Ruiz, MS4  Division of Renal Disease and Hypertension  [facility]  [employer] Web Console    Fellow Attestation   I, Que Heath, was present with the medical student who participated in the service and in the documentation of the note.  I have verified the history and personally performed the physical exam and medical decision making.  I agree with the assessment and plan of care as documented in the note.    [place] findings:  - Will initiate iHD tomorrow, 2 hr run with low BFR/DFR and iHD again on 1/28. Will likely switch to MWF schedule thereafter  - Appreciate SW/CC help with outpatient placement for HD     Seen and discussed with Dr Scottie Nieto MD  Renal Fellow  [phone]    Interval History :   Nursing and provider notes from last 24 hours reviewed.  In the last 24 hours Damion Quinones notes improving mental fogginess on lactulose/rifaximin. One liter urine output yesterday without diuretics. Today, improved energy and able to walk in the [place] with physical therapy.     Physical Exam:   I/O last 3 completed shifts:  In: 1720 [P.O.:1720]  Out: 1455 [Urine:1455]   BP (!) 148/86 (BP Location: Right arm)   Pulse 77   Temp 97.4  F (36.3  C) (Oral)   Resp 19   Ht 1.727 m (5' 8\")   Wt 95 kg (209 lb 7 oz)   SpO2 100%   BMI 31.84 kg/m       GENERAL APPEARANCE: NAD  EYES: R conjunctival hemorrhage  PULM: breathing non-labored  CV: regular rhythm, normal rate, no rub     -edema ++   GI: soft, distended  NEURO:  " AOx2, normal speech    Labs:   All labs reviewed by me  Electrolytes/Renal -   Recent Labs   Lab Test 01/26/23  0538 01/25/23  0631 01/24/23  1014 01/24/23  0611    135* 137 136   POTASSIUM 4.4 4.4 4.7 5.4*   CHLORIDE 103 101 101 101   CO2 16* 14* 17* 9*   BUN 86.7* 88.2* 91.3* 92.1*   CR 6.38* 6.32* 6.30* 6.22*   GLC 87 117* 104* 85   NAZARIO 9.1 8.9 9.0 8.6*   MAG 2.2 2.0  --  1.9   PHOS 6.2* 6.2*  --  6.2*       CBC -   Recent Labs   Lab Test 01/26/23  0538 01/25/23  0631 01/24/23  0611   WBC 13.8* 13.7* 16.6*   HGB 12.0* 11.6* 12.7*   * 99* 88*       LFTs -   Recent Labs   Lab Test 01/26/23  0538 01/25/23  0631 01/24/23  1014 01/24/23  0611   ALKPHOS 210* 210*  --  219*   BILITOTAL 0.9 0.9  --  1.0   ALT 49 47  --  54*   AST 53* 44 47  --    PROTTOTAL 5.1* 5.1*  --  5.5*   ALBUMIN 3.0* 2.9*  --  2.9*       Iron Panel -   Recent Labs   Lab Test 11/22/22  0848   IRON 68   IRONSAT 31   HOLA 429*     Imaging:  All imaging studies reviewed by me.     Current Medications:    diltiazem ER COATED BEADS  120 mg Oral Daily     folic acid  5 mg Oral Daily     lactulose  20 g Oral TID     multivitamin w/minerals  1 tablet Oral Daily     pantoprazole  20 mg Oral Daily     polyethylene glycol  17 g Oral TID     predniSONE  10 mg Oral Daily     rifaximin  550 mg Oral BID     sodium chloride (PF)  3 mL Intracatheter Q8H       heparin 800 Units/hr (01/26/23 0249)     Sasha Ruiz

## 2023-01-26 NOTE — PLAN OF CARE
Goal Outcome Evaluation:      Plan of Care Reviewed With: patient    Overall Patient Progress: improvingOverall Patient Progress: improving    Outcome Evaluation: Patient more alert after third dose of lactulose.  frequent loose stools and voiding.    A:  patient completed series of lactulose for [place].  Oriented to self, place and situation.  Difficulty with stating month.  Up frequently to void and have stool.    R:  continue to monitor and treat per plan of care.

## 2023-01-26 NOTE — PLAN OF CARE
Cares from: [id number]     V/S & pain: VSS on RA ex HTN, denies pain   Neuro: Neuro/WH Q4- A/O x3/4 intermittently disorientated to time, pt able to correctly identify the month, at times states the wrong year but eventually able to correctly identify the year, pt's wife requested to hold off on morning lactulose d/t going down to IR  Respiratory: stable on RA, clear/equal lung sounds bilaterally   Skin: no new skin concerns present-  +2 BLE/BUE, umbilical hernia, blanchable redness to buttocks- barrier cream applied, abrasion and bruising present to BUE   GI/: voiding spontaneously, loose/soft BM x2   Nutrition: NPO since 0000 for IR, some nausea this morning, prn zofran given  Lines/drains: L PIV SL, R PIV infusing continuous heparin gtt @ 800 units/hr  Activity: up Ax1 w/ GB, pt up and walked halls w/ wife and NST, worked w/ therapy today  Labs: no RN managed labs, awaiting afternoon Xa check to result     Events this shift: no acute events this shift. Neuro checks/WH stable pt more A/O today, making jokes and asking approptiate questions. pt continues to be on heparin gtt for DVT awaiting afternoon Xa results.  Morning Xa was therapeutic. NPO since 0000 for CVC placement in IR for HD. Up Ax1 w/ GB, pt up and walked halls w/ wife and NST. Pt left for IR at 1420. Wife remains at bedside, call light w/in reach and able to make needs known, will continue to monitor.     Plan: continue w/ poc     Goal Outcome Evaluation:      Plan of Care Reviewed With: patient    Overall Patient Progress: improvingOverall Patient Progress: improving    Outcome Evaluation: A/O x3/4, IR for CVC placement

## 2023-01-26 NOTE — PLAN OF CARE
Goal Outcome Evaluation:      Plan of Care Reviewed With: patient    Overall Patient Progress: improvingOverall Patient Progress: improving    Outcome Evaluation: oriented times 4.    A:  up every 30 minutes through night to stool.  Much clearer this morning. Oriented times 4.  Bed alarm on.  Npo for CVC placement in IR today.    R:  continue to monitor and treat per plan of care.

## 2023-01-26 NOTE — PROVIDER NOTIFICATION
Provider notified (name):Radha Banks MD  Reason for notification:patient is npo for tunnel cath placement for dialysis today.  when should I stop his heparin drip?  Recommendation/request given to provider:  Response from provider:  Waiting for response. Called back and states IR recomends not  to stop heparin.

## 2023-01-26 NOTE — PRE-PROCEDURE
GENERAL PRE-PROCEDURE:   Procedure:  IR tunneled CVC placement  Date/Time:  1/26/2023 2:47 PM    Written consent obtained?: Yes    Risks and benefits: Risks, benefits and alternatives were discussed    Consent given by:  Patient  Patient states understanding of procedure being performed: Yes    Patient's understanding of procedure matches consent: Yes    Procedure consent matches procedure scheduled: Yes    Expected level of sedation:  Moderate  Appropriately NPO:  Yes  ASA Class:  2  Mallampati  :  Grade 3- soft palate visible, posterior pharyngeal wall not visible  Lungs:  Lungs clear with good breath sounds bilaterally  Heart:  Normal heart sounds and rate  History & Physical reviewed:  History and physical reviewed and no updates needed  Statement of review:  I have reviewed the lab findings, diagnostic data, medications, and the plan for sedation

## 2023-01-26 NOTE — PROGRESS NOTES
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 He was noted to have + DVT in right leg.     RECOMMENDATIONS:  # Hepatic encephalopathy  - may be multifactorial with worsening BUN, TIPS, DVT, delirium. Now close to baseline.   - continue rifaximin, lactulose, miralax with a goal of 3-5 BM's per day   - Infectious work up so far negative.    # Right leg DVT  - on heparin for DVT with plans to transition to anticoagulation    # Alcohol/homozygous H63D  - MELD 23. In liver transplant eval with need for cardiac evaluation risk assessment. He had planned on having MRI cardiac 1/30. If able to do inpatient, when stable, that would be beneficial.   - Also needs sleep study as OP  - US abd 1/23 [facility] HCC  - peth neg    # CKD  - creatinine remains elevated. Nephrology following. Plan for starting HD.   - Would need SLK for transplant if a candidate.     # EV  # s/p TIPS  - no evidnece of GIB. No ascites seen on imaging.   - no current evidence of TIPS malfunction.     # Debility  - PT/OT for mobilization.     Patient was discussed with attending physician, Dr. Green    Next follow up appointment: tbd    Thank you for the opportunity to be involved with  Damion Quinones care. Please call with any questions or concerns.    JADE Castano, CNP  Inpatient Hepatology [facility]  Text Link        SUBJECTIVE:  Improvement in mentation. No fevers, abdominal pain, nausea or vomiting.     ROS:  A 10-point review of systems was negative.    OBJECTIVE:  VS: BP (!) 151/96 (BP Location: Right arm)   Pulse 84   Temp 97.8  F (36.6  C) (Oral)   Resp 18   Ht 1.727 m (5' 8\")   Wt 95 kg (209 lb 7 oz)   SpO2 100%   BMI 31.84 " kg/m        General: In no acute distress, mild facial muscle wasting  Neuro: AOx3, No asterixis  HEENT:  Noscleral icterus  CV:  Skin warm and dry  Lungs:  Respirations even and nonlabored on room air  Abd:  Nondistended, nontender   Extrem: +1-2peripheral edema   Skin: Nojaundice  Psych: pleasant    MEDICATIONS:  Current Facility-Administered Medications   Medication     bumetanide (BUMEX) tablet 1 mg     diltiazem ER COATED BEADS (CARDIZEM CD/CARTIA XT) 24 hr capsule 120 mg     folic acid (FOLVITE) tablet 5 mg     heparin 25,000 units in 0.45% NaCl 250 mL ANTICOAGULANT infusion     lactulose (CHRONULAC) solution 20 g    Or     lactulose (CHRONULAC) solution for enema prep 100 g     lactulose (CHRONULAC) solution 20 g     lidocaine (LMX4) cream     lidocaine 1 % 0.1-1 mL     melatonin tablet 1 mg     multivitamin w/minerals (THERA-VIT-M) tablet 1 tablet     ondansetron (ZOFRAN) injection 4 mg     pantoprazole (PROTONIX) EC tablet 20 mg     polyethylene glycol (MIRALAX) Packet 17 g     polyethylene glycol-propylene glycol PF (SYSTANE ULTRA PF) opthalmic solution 1 drop     predniSONE (DELTASONE) tablet 10 mg     rifaximin (XIFAXAN) tablet 550 mg     sodium chloride (PF) 0.9% PF flush 3 mL     sodium chloride (PF) 0.9% PF flush 3 mL       REVIEW OF LABORATORY, PATHOLOGY AND IMAGING RESULTS:  BMP  Recent Labs   Lab 01/26/23  0538 01/25/23  0631 01/24/23  1014 01/24/23  0611    135* 137 136   POTASSIUM 4.4 4.4 4.7 5.4*   CHLORIDE 103 101 101 101   NAZARIO 9.1 8.9 9.0 8.6*   CO2 16* 14* 17* 9*   BUN 86.7* 88.2* 91.3* 92.1*   CR 6.38* 6.32* 6.30* 6.22*   GLC 87 117* 104* 85     CBC  Recent Labs   Lab 01/26/23  0538 01/25/23  0631 01/24/23  0611 01/23/23  1649   WBC 13.8* 13.7* 16.6* 16.0*   RBC 4.14* 3.99* 4.35* 4.65   HGB 12.0* 11.6* 12.7* 13.7   HCT 37.3* 36.3* 41.3 42.4   MCV 90 91 95 91   MCH 29.0 29.1 29.2 29.5   MCHC 32.2 32.0 30.8* 32.3   RDW 14.8 14.8 14.7 14.9   * 99* 88* 111*     INR  Recent Labs    Lab 01/23/23  0550   INR 1.23*     LFTs  Recent Labs   Lab 01/26/23  0538 01/25/23  0631 01/24/23  1014 01/24/23  0611 01/23/23  0550   ALKPHOS 210* 210*  --  219* 220*   AST 53* 44 47  --  51*   ALT 49 47  --  54* 56*   BILITOTAL 0.9 0.9  --  1.0 0.9   PROTTOTAL 5.1* 5.1*  --  5.5* 5.7*   ALBUMIN 3.0* 2.9*  --  2.9* 3.2*        MELD-Na score: 23 at 1/25/2023  6:31 AM  MELD score: 22 at 1/25/2023  6:31 AM  Calculated from:  Serum Creatinine: 6.32 mg/dL (Using max of 4 mg/dL) at 1/25/2023  6:31 AM  Serum Sodium: 135 mmol/L at 1/25/2023  6:31 AM  Total Bilirubin: 0.9 mg/dL (Using min of 1 mg/dL) at 1/25/2023  6:31 AM  INR(ratio): 1.23 at 1/23/2023  5:50 AM  Age: 67 years      Imaging Results:  None current

## 2023-01-26 NOTE — PROGRESS NOTES
01/26/23 1120   Appointment Info   Signing Clinician's Name / Credentials (OT) Lindsay Kearney, OTR/L   Living Environment   People in Home spouse   Current Living Arrangements house   Home Accessibility stairs to enter home;stairs within home   Number of Stairs, Main Entrance 2   Number of Stairs, Within Home, Primary eight   Stair Railings, Within Home, Primary railing on right side (ascending)   Transportation Anticipated family or friend will provide   Living Environment Comments Pt lives in 4 level home. Kitchen and living room on main level. Bedrooms and bathroom on 2nd level with 8 stairs to 2 level. Bathroom has a tub shower with no grab bars, and downstairs bathroom has walk in shower. Grab bar by toilet in bathroom downstairs.   Self-Care   Usual Activity Tolerance good   Current Activity Tolerance fair   Equipment Currently Used at Home walker, rolling   Fall history within last six months no   Activity/Exercise/Self-Care Comment Reports IND with ADLs/IADLs, walks without AD   General Information   Onset of Illness/Injury or Date of Surgery 01/23/23   Referring Physician Lolis Bermudez MD   Additional Occupational Profile Info/Pertinent History of Current Problem Damion Quinones is a 67 year old male admitted on 1/23/2023. He has a PMH significant for EtOH associated cirrhosis c/b hepatic encephalopathy and esophageal varices s/p TIPS, paroxysmal atrial fibrillation, CKD, psoriatic arthritis, HTN, ANCA vasculitis, T2DM2, recent admission for c diff 1/15-1/16/23, now admitted for acute encephalopathy likely 2/2 hepatic encephalopathy.   Existing Precautions/Restrictions fall   Left Upper Extremity (Weight-bearing Status) full weight-bearing (FWB)   Right Upper Extremity (Weight-bearing Status) full weight-bearing (FWB)   Left Lower Extremity (Weight-bearing Status) full weight-bearing (FWB)   Right Lower Extremity (Weight-bearing Status) full weight-bearing (FWB)   Cognitive Status  "Examination   Orientation Status orientation to person, place and time   Cognitive Status Comments Required increased time to answer questions t/o subjective eval, engaged in appropriate conversation t/o. Required min verbal cues for reminders asking \"Where are we going?\" after OT told pt to walk to bathroom. Pt and wife report improvement in cognition today compared to yesterday   Visual Perception   Visual Impairment/Limitations corrective lenses for reading   Impact of Vision Impairment on Function (Vision) Wife and pt reporting difficulty scanning and locating objects yesterday, but reports improvement with no visual changes from baseline at this time.   Sensory   Sensory Quick Adds sensation intact   Pain Assessment   Patient Currently in Pain No   Range of Motion Comprehensive   General Range of Motion no range of motion deficits identified   Strength Comprehensive (MMT)   Comment, General Manual Muscle Testing (MMT) Assessment Gross 4/5 strength in BUE   Transfers   Transfer Comments CGA STS   Activities of Daily Living   BADL Assessment/Intervention bathing;upper body dressing;lower body dressing;grooming;toileting   Bathing Assessment/Intervention   Comment, (Bathing) Per clinical judgement, Min A   Upper Body Dressing Assessment/Training   Comment, (Upper Body Dressing) Per clinical judgement, SBA   Lower Body Dressing Assessment/Training   Comment, (Lower Body Dressing) Per clinical judgement, CGA   Grooming Assessment/Training   Comment, (Grooming) Per clinical judgement, CGA   Toileting   Comment, (Toileting) CGA with toilet transfer   Clinical Impression   Criteria for Skilled Therapeutic Interventions Met (OT) Yes, treatment indicated   OT Diagnosis Decline in ADL performance, impaired cognition   Influenced by the following impairments Impaired cognition, decreased endurance, decreased strength, decreased balance   OT Problem List-Impairments impacting ADL problems related to;activity tolerance " impaired;balance;cognition;mobility;strength;pain   Assessment of Occupational Performance 3-5 Performance Deficits   Identified Performance Deficits Dressing, bathing, household management, meal prep   Planned Therapy Interventions (OT) ADL retraining;IADL retraining;balance training;cognition;ROM;strengthening;stretching;transfer training;home program guidelines;progressive activity/exercise;risk factor education   Clinical Decision Making Complexity (OT) low complexity   Anticipated Equipment Needs Upon Discharge (OT) shower chair   Risk & Benefits of therapy have been explained evaluation/treatment results reviewed;care plan/treatment goals reviewed;risks/benefits reviewed;current/potential barriers reviewed;participants voiced agreement with care plan;participants included;patient;spouse/significant other   OT Total Evaluation Time   OT Eval, Low Complexity Minutes (77147) 7   OT Goals   Therapy Frequency (OT) 5 times/wk   OT Predicted Duration/Target Date for Goal Attainment 02/10/23   OT Goals Hygiene/Grooming;Upper Body Dressing;Lower Body Dressing;Upper Body Bathing;Lower Body Bathing;Toilet Transfer/Toileting;Cognition;OT Goal 1   OT: Hygiene/Grooming modified independent   OT: Upper Body Dressing Modified independent   OT: Lower Body Dressing Modified independent   OT: Upper Body Bathing Modified independent   OT: Lower Body Bathing Modified independent   OT: Toilet Transfer/Toileting Modified independent   OT: Cognitive Patient/caregiver will verbalize understanding of cognitive assessment results/recommendations as needed for safe discharge planning   OT: Goal 1 Pt will perform tub/shower transfer with SBA and DME as needed.   Self-Care/Home Management   Self-Care/Home Mgmt/ADL, Compensatory, Meal Prep Minutes (80209) 12   Symptoms Noted During/After Treatment (Meal Preparation/Planning Training) fatigue   Treatment Detail/Skilled Intervention OT: Upon completion of eval, treatment indicated.  "Facilitated STS transfer with CGA and IV pole. Pt adamently declining using FWW. Performed functional mobility and toilet transfer with CGA and IV pole. Pt required 1 verbal cue for reminder of location OT instructed pt to walk to after pt asked \"Where are we going?\" Pt and spouse reporting improved vision and cognition today compared to yesterday. Educated pt and wife on plan to complete cognitive screen in future OT session to determine areas of deficits, with pt and spouse agreeable. Session discontinued d/t provider in room. Pt returned to seated in chair at end of session with wife and providers in room.   OT Discharge Planning   OT Plan Cog screen, ADLs standing at sink, total body dressing   OT Discharge Recommendation (DC Rec) Transitional Care Facility;home with assist;home with home care occupational therapy   OT Rationale for DC Rec Pt performing below functional baseline with ADLs/IADLs, limited by decreased endurance and impaired cognition. At this time, recommend TCU to progress IND and safety with ADLs/IADLs, as wife is not able to provide 24/7 assistance. If pt were to d/c home, pt would require 24/7 assist with all functional mobility and ADLs/IADLs and HHOT.   OT Brief overview of current status CGA with IV pole in room   Total Session Time   Timed Code Treatment Minutes 12   Total Session Time (sum of timed and untimed services) 19     "

## 2023-01-26 NOTE — PROCEDURES
[facility]    Procedure: Left IJ tunneled dialysis catheter placement    Date/Time: 1/26/2023 3:53 PM  Performed by: Truong Avalos MD  Authorized by: Conor Galloway PA-C       UNIVERSAL PROTOCOL   Site Marked: NA  Prior Images Obtained and Reviewed:  Yes  Required items: Required blood products, implants, devices and special equipment available    Patient identity confirmed:  Verbally with patient, arm band, provided demographic data and hospital-assigned identification number  Patient was reevaluated immediately before administering moderate or deep sedation or anesthesia  Confirmation Checklist:  Patient's identity using two indicators, relevant allergies, procedure was appropriate and matched the consent or emergent situation and correct equipment/implants were available  Time out: Immediately prior to the procedure a time out was called    Universal Protocol: the Joint Commission Universal Protocol was followed    Preparation: Patient was prepped and draped in usual sterile fashion       ANESTHESIA    Anesthesia: Local infiltration  Local Anesthetic:  Lidocaine 1% without epinephrine      SEDATION  Patient Sedated: Yes    Sedation:  Fentanyl and midazolam  Vital signs: Vital signs monitored during sedation    See dictated procedure note for full details.  Findings: Successful left internal jugular dialysis catheter placement (14.5 Fr x 27 cm glidepath)    Specimens: none    Complications: None    Condition: Stable      PROCEDURE    Patient Tolerance:  Patient tolerated the procedure well with no immediate complications  Length of time physician/provider present for 1:1 monitoring during sedation: 30

## 2023-01-26 NOTE — PROGRESS NOTES
[facility]    Progress Note - Medicine Service, ORALIA TEAM 2       Date of Admission:  1/23/2023    Assessment & Plan   Damion Quinones is a 67 year old male admitted on 1/23/2023. He has a PMH significant for EtOH associated cirrhosis c/b hepatic encephalopathy and esophageal varices s/p TIPS, paroxysmal atrial fibrillation, CKD, psoriatic arthritis, HTN, ANCA vasculitis, T2DM2, recent admission for c diff 1/15-1/16/23, now admitted for acute encephalopathy likely 2/2 hepatic encephalopathy.     Changes today:  - IR consult today 1/26 for tunneled dialysis catheter placement with plan for HD evening of 1/26  - continue rifaximin and lactulose titrated to ~3-5 soft BMs daily  - continue high intensity heparin infusion for DVT  - bumex 1 mg BID    Decompensated cirrhosis  Acute toxic metabolic encephalopathy, likely 2/2 hepatic encephalopathy, improving  Cirrhosis 2/2 EtOH use c/b hepatic encephalopathy, ascites, and varices s/p TIPS and variceal banding. Liver transplant evaluation is underway. Ammonia on admit 186; acute hepatic encephalopathy likely explanation for his presenting altered mental status. Precipitating cause for this is possibly infection (such as known c diff) vs poor clearance (BMs in general have been slowing down). No e/o GI bleeding currently. Additional encephalopathy workup includes CT Head (no acute abnormality).  No other major metabolic disturbances beyond baseline to explain his AMS. No hypoxia. Given clinical stability, holding empiric SBP treatment. Cautious with empiric abx in this patient unless obvious infection per earlier ID guidance (ex. avoid 3rd/4th gen cephalosporins and clindamycin whenever possible due to recurrent c diff). 1/23 UA with some pyuria but no symptoms, Bcx NGTD. TIPS US showed patent TIPS.  - Hepatology consult, appreciate recs  - peth WNL  - PTA lactulose, titrate to ~3-5 stools per day.  - PTA rifaximin  -  miralax  - Continue to treat c diff (see below)  - No additional empiric abx unless infectious workup is otherwise revealing  - Neuro checks and fall precautions  - Daily MELD labs    PATI on CKD 4 2/2 IgA vasculitis vs cirrhosis  Elevated Cr  Cr elevated above baseline of 5.3, meets PATI criteria. Likely is due to hypovolemia in setting of his c diff infection and poor PO intake since last admission. Also need to consider HRS. Consulted nephrology due to inadequate response to fluid challenge.  - Received 1L LR on 1/23 and another liter bolus on 1/24  - strict I/Os  - Avoid nephrotoxins  - Holding PTA diuretics given c/f dehydration  - Trend Cr  - Nephrology consult, appreciate recs    > 1/26 IR consult for tunneled dialysis catheter placement    > plan to start HD evening of 1/26    > daily renal panel    > renally dose meds    > renal diet    > daily weights  - resuming PTA bumex at half dose (1 mg BID)     Right lower extremity DVT  Presented with right greater than left lower extremity swelling, present for 1-2 days prior to admission, progressively worsening. RLE appears about twice the size of LLE. Doppler US 1/23 showing DVT in right leg.   - heparin infusion  - will likely transition to DOAC prior to discharge - likely apixaban given patient's renal function, but will inquire with nephrology    Recurrent c diff infection  Admitted recently for this. Hx of recurrence, at last admission was discharged on 10 day course of fidaxomicin per ID reccs. Still taking this. Per ID, if requiring other abx would need ppx with oral vancomycin 125mg BID through course.  - Continue fidaximicin through 1/25              - If requiring other abx, may need oral vanco ppx as well     Anion gap metabolic acidosis  Anion gap of 19 on presentation, in setting of CKD and uremia.  - treatment of hepatic encephalopathy, as above     Paroxysmal afib  Not anticoagulated outpatient.   - Continue PTA diltiazem     Psoriatic arthritis  On  "tapered steroid course, currently taking 10mg matt of prednisone  - continue pred taper  - consider PJP ppx if prolonged pred therapy       Diet: Regular Diet Adult    DVT Prophylaxis: on heparin infusion for DVT  Lux Catheter: Not present  Fluids: none  Lines: PRESENT             Cardiac Monitoring: None  Code Status: Full Code      Clinically Significant Risk Factors             # Anion Gap Metabolic Acidosis: Highest Anion Gap = 20 mmol/L in last 2 days, will monitor and treat as appropriate  # Hypoalbuminemia: Lowest albumin = 2.9 g/dL at 1/25/2023  6:31 AM, will monitor as appropriate   # Thrombocytopenia: Lowest platelets = 99 in last 2 days, will monitor for bleeding  # Acute Kidney Injury, unspecified: based on a >150% or 0.3 mg/dL increase in last creatinine compared to past 90 day average, will monitor renal function         # Obesity: Estimated body mass index is 31.84 kg/m  as calculated from the following:    Height as of this encounter: 1.727 m (5' 8\").    Weight as of this encounter: 95 kg (209 lb 7 oz)., PRESENT ON ADMISSION         Disposition Plan      Expected Discharge Date: 01/30/2023    Discharge Delays: Dialysis - arrange outpatient  Placement - TCU  Destination: home with family;inpatient rehabilitation facility  Discharge Comments: Dispo: TCU or Homecare and OP HD  Delays: Tunneled cath by IR today; Needs OP HD set-up by SW; family needs to decide which dispo they want; 1:1 sitter  Progress: Improved mentation. OP HD set-up; placement-TCU vs homecare.        The patient's care was discussed with the Attending Physician, Dr. Samuels.    Rhianna Arriaga, MS3  Medical Student  Medicine Service, [address]  Securely message with Vocera (more info)  Text page via [facility] Paging/Directory   See signed in provider for up to date coverage information     I, Nishant Esteban MD, was present with the medical/GEORGE student who participated in the " service and in the documentation of the note.  I have verified the history and personally performed the physical exam and medical decision making.  I agree with the assessment and plan of care as documented in the note and have made edits as needed.     Nishant Esteban MD  PGY1  Date of Service (when I saw the patient): 1/26/2023    ______________________________________________________________________    Interval History   Nursing notes reviewed. Patient oriented to self and hospital, had some difficulty with year. Says that he feels OK today, and is more like himself per wife. Has some nausea this morning and hasn't taken morning meds (has been NPO since midnight for procedure today). Has had multiple BMs overnight. Has been eating some of his meals. Urine output has been normal. Patient is feeling somewhat anxious about starting dialysis and what his future will look like.    Physical Exam   Vital Signs: Temp: 98.4  F (36.9  C) Temp src: Oral BP: (!) 151/81 Pulse: 97   Resp: 20 SpO2: 99 % O2 Device: None (Room air)    Weight: 209 lbs 6.99 oz     General: Lying in bed, awake, alert, NAD, wife at bedside  HEENT: NC/AT, EOMI, anicteric sclerae, MMM  Neck: Supple  CV: RRR, no murmurs  Respiratory: CTAB, breathing is non-labored  Abdomen: Distended, soft, non-tender to palpation  Ext: WWP, bilateral LE edema, worse on right  Skin: Warm, dry, no rashes  Neuro: Moves all extremities equally    Medical Decision Making       Please see A&P for additional details of medical decision making.      Data   ------------------------- PAST 24 HR DATA REVIEWED -----------------------------------------------    I have personally reviewed the following data over the past 24 hrs:    13.8 (H)  \   12.0 (L)   / 116 (L)     138 103 86.7 (H) /  87   4.4 16 (L) 6.38 (H) \       ALT: 49 AST: 53 (H) AP: 210 (H) TBILI: 0.9   ALB: 3.0 (L) TOT PROTEIN: 5.1 (L) LIPASE: N/A       Procal: N/A CRP: N/A Lactic Acid: 2.3 (H)         Imaging results  reviewed over the past 24 hrs:   Recent Results (from the past 24 hour(s))   IR CVC Tunnel Placement > 5 Yrs of Age    Narrative    Procedure 1/26/2023:  Ultrasound guided left internal jugular vein access  Fluoroscopic guided placement of right chest tunneled hemodialysis  catheter    History: 67-year-old male with CKD requiring hemodialysis    Comparison: No pertinent comparison exams    Staff: Dr. Truong Avalos    Fellow: None    Monitoring/Medications: Patient received moderate sedation and was  monitored by the nursing staff under the supervision of the IR  attending. Vital signs remained stable throughout the procedure.    0.5 mg Versed  25 mcg Fentanyl  1% lidocaine used for local analgesia.    Continuous face-to-face sedation time: 30 minutes    Fluoro time: 1.2 minutes    Findings/procedure:    Prior to the procedure, both verbal and written informed consent  obtained from the patient. Patient placed supine. The left neck and  chest were prepped in usual sterile fashion. Preprocedure ultrasound  demonstrated a patent left internal jugular vein.    1% lidocaine for local anesthesia. Under ultrasound guidance a  micropuncture needle was advanced into the left internal jugular vein.  Image documenting the needle within the vein  was archived. Microwire  advanced through the needle and needle exchanged for micropuncture  sheath. Appropriate length for the catheter was measured.  Micropuncture exchanged for a Amplatz wire which was advanced into the  IVC. An appropriate tunnel site along the left chest was selected and  anesthetized. A small incision was made and a 14.5 Upper sorbian x 27 cm dual  lumen cuffed tunneled hemodialysis catheter was tunneled under skin to  the venotomy site. A peel-away sheath was advanced over the wire. The  catheter was then advanced through the peel-away sheath with its tip  terminating in the right atrium. Both lumens aspirated and flushed  appropriately. The lumens were locked with  2 cc of 1000 units per cc  of heparin. The venotomy site was closed using surgical glue. The  catheter was then dressed with sterile materials.      Impression    IMPRESSION:  Successful placement of 14.5 Sudanese x 27 cm tip to cuff dual lumen  tunneled hemodialysis catheter.    SHREYAS MALAVE MD         SYSTEM ID:  [id number]

## 2023-01-26 NOTE — CODE/RAPID RESPONSE
Rapid Response Team Note    Assessment   A rapid response was called on Damion Quinones due to lactic acidosis. This presentation is likely due to liver and kidney disease.     This is a 67 year old with ESLD and PATI and anticipated dialysis initiation.  I was called for a LA of 2.3.  No fevers, chills, hypotension or any other new concerns.  No new pain.    - Suspect LA is driven by ESLD and ESRD.  No indication for IV fluids or new antibiotics at this time.    Plan   -  As above  -  The Internal Medicine primary team was paged.  -  Disposition: The patient will remain on the current unit. We will continue to monitor this patient closely.  -  Reassessment and plan follow-up will be performed by the primary team    JADE Alvares [facility] RRT [facility] Job Code Contact #[phone]  [facility] Paging/Directory    Hospital Course   Brief Summary of events leading to rapid response:   As above    Admission Diagnosis:   Hepatic encephalopathy [K76.82]  Altered mental status, unspecified altered mental status type [R41.82]    Physical Exam   Temp: 98.4  F (36.9  C) Temp  Min: 96.4  F (35.8  C)  Max: 98.4  F (36.9  C)  Resp: 20 Resp  Min: 18  Max: 20  SpO2: 99 % SpO2  Min: 96 %  Max: 100 %  Pulse: 97 Pulse  Min: 70  Max: 97    No data recorded  BP: (!) 151/81 Systolic (24hrs), Av , Min:143 , Max:173   Diastolic (24hrs), Av, Min:78, Max:109     I/Os: I/O last 3 completed shifts:  In: 500 [P.O.:500]  Out: 855 [Urine:855]     Exam:   General: chronically ill appearing  Mental Status: AAOx4.    Significant Results and Procedures   Lactic Acid:   Recent Labs   Lab Test 23  1643 01/15/23  1140 22  2004   LACT 2.3* 1.3 1.7     CBC:   Recent Labs   Lab Test 23  0538 23  0631 23  0611   WBC 13.8* 13.7* 16.6*   HGB 12.0* 11.6* 12.7*   HCT 37.3* 36.3* 41.3   * 99* 88*        Sepsis Evaluation   The patient is not known to have an infection.  NO EVIDENCE OF SEPSIS at this time.   Vital sign, physical exam, and lab findings are due to ESLD.

## 2023-01-27 ENCOUNTER — APPOINTMENT (OUTPATIENT)
Dept: PHYSICAL THERAPY | Facility: CLINIC | Age: 68
DRG: 441 | End: 2023-01-27
Payer: MEDICARE

## 2023-01-27 ENCOUNTER — APPOINTMENT (OUTPATIENT)
Dept: OCCUPATIONAL THERAPY | Facility: CLINIC | Age: 68
DRG: 441 | End: 2023-01-27
Payer: MEDICARE

## 2023-01-27 PROBLEM — N18.6 ESRD (END STAGE RENAL DISEASE) ON DIALYSIS (H): Status: ACTIVE | Noted: 2023-01-27

## 2023-01-27 PROBLEM — Z99.2 ESRD (END STAGE RENAL DISEASE) ON DIALYSIS (H): Status: ACTIVE | Noted: 2023-01-27

## 2023-01-27 LAB
ALBUMIN SERPL BCG-MCNC: 2.8 G/DL (ref 3.5–5.2)
ALP SERPL-CCNC: 203 U/L (ref 40–129)
ALT SERPL W P-5'-P-CCNC: 44 U/L (ref 10–50)
ANION GAP SERPL CALCULATED.3IONS-SCNC: 16 MMOL/L (ref 7–15)
AST SERPL W P-5'-P-CCNC: 56 U/L (ref 10–50)
BILIRUB SERPL-MCNC: 0.7 MG/DL
BUN SERPL-MCNC: 85.2 MG/DL (ref 8–23)
CALCIUM SERPL-MCNC: 8.8 MG/DL (ref 8.8–10.2)
CHLORIDE SERPL-SCNC: 102 MMOL/L (ref 98–107)
CREAT SERPL-MCNC: 6.32 MG/DL (ref 0.67–1.17)
DEPRECATED HCO3 PLAS-SCNC: 17 MMOL/L (ref 22–29)
ERYTHROCYTE [DISTWIDTH] IN BLOOD BY AUTOMATED COUNT: 14.9 % (ref 10–15)
GFR SERPL CREATININE-BSD FRML MDRD: 9 ML/MIN/1.73M2
GLUCOSE SERPL-MCNC: 101 MG/DL (ref 70–99)
HBV SURFACE AG SERPL QL IA: NONREACTIVE
HCT VFR BLD AUTO: 34.5 % (ref 40–53)
HGB BLD-MCNC: 11.1 G/DL (ref 13.3–17.7)
LACTATE SERPL-SCNC: 1.9 MMOL/L (ref 0.7–2)
MAGNESIUM SERPL-MCNC: 1.9 MG/DL (ref 1.7–2.3)
MCH RBC QN AUTO: 28.9 PG (ref 26.5–33)
MCHC RBC AUTO-ENTMCNC: 32.2 G/DL (ref 31.5–36.5)
MCV RBC AUTO: 90 FL (ref 78–100)
PHOSPHATE SERPL-MCNC: 6.5 MG/DL (ref 2.5–4.5)
PLATELET # BLD AUTO: 106 10E3/UL (ref 150–450)
POTASSIUM SERPL-SCNC: 4.4 MMOL/L (ref 3.4–5.3)
PROT SERPL-MCNC: 4.8 G/DL (ref 6.4–8.3)
RBC # BLD AUTO: 3.84 10E6/UL (ref 4.4–5.9)
SODIUM SERPL-SCNC: 135 MMOL/L (ref 136–145)
UFH PPP CHRO-ACNC: 0.22 IU/ML
UFH PPP CHRO-ACNC: 0.56 IU/ML
WBC # BLD AUTO: 12.4 10E3/UL (ref 4–11)

## 2023-01-27 PROCEDURE — 36415 COLL VENOUS BLD VENIPUNCTURE: CPT | Performed by: INTERNAL MEDICINE

## 2023-01-27 PROCEDURE — 250N000013 HC RX MED GY IP 250 OP 250 PS 637: Performed by: STUDENT IN AN ORGANIZED HEALTH CARE EDUCATION/TRAINING PROGRAM

## 2023-01-27 PROCEDURE — 83605 ASSAY OF LACTIC ACID: CPT | Performed by: INTERNAL MEDICINE

## 2023-01-27 PROCEDURE — 80053 COMPREHEN METABOLIC PANEL: CPT

## 2023-01-27 PROCEDURE — 99233 SBSQ HOSP IP/OBS HIGH 50: CPT | Mod: GC | Performed by: INTERNAL MEDICINE

## 2023-01-27 PROCEDURE — 258N000003 HC RX IP 258 OP 636: Performed by: INTERNAL MEDICINE

## 2023-01-27 PROCEDURE — 97129 THER IVNTJ 1ST 15 MIN: CPT | Mod: GO

## 2023-01-27 PROCEDURE — 85520 HEPARIN ASSAY: CPT

## 2023-01-27 PROCEDURE — 84100 ASSAY OF PHOSPHORUS: CPT

## 2023-01-27 PROCEDURE — 120N000002 HC R&B MED SURG/OB UMMC

## 2023-01-27 PROCEDURE — 83735 ASSAY OF MAGNESIUM: CPT

## 2023-01-27 PROCEDURE — 250N000013 HC RX MED GY IP 250 OP 250 PS 637

## 2023-01-27 PROCEDURE — 97116 GAIT TRAINING THERAPY: CPT | Mod: GP

## 2023-01-27 PROCEDURE — 5A1D70Z PERFORMANCE OF URINARY FILTRATION, INTERMITTENT, LESS THAN 6 HOURS PER DAY: ICD-10-PCS | Performed by: INTERNAL MEDICINE

## 2023-01-27 PROCEDURE — 250N000012 HC RX MED GY IP 250 OP 636 PS 637: Performed by: STUDENT IN AN ORGANIZED HEALTH CARE EDUCATION/TRAINING PROGRAM

## 2023-01-27 PROCEDURE — 97535 SELF CARE MNGMENT TRAINING: CPT | Mod: GO

## 2023-01-27 PROCEDURE — 99233 SBSQ HOSP IP/OBS HIGH 50: CPT | Mod: 24 | Performed by: INTERNAL MEDICINE

## 2023-01-27 PROCEDURE — 250N000011 HC RX IP 250 OP 636

## 2023-01-27 PROCEDURE — 85520 HEPARIN ASSAY: CPT | Performed by: INTERNAL MEDICINE

## 2023-01-27 PROCEDURE — 97530 THERAPEUTIC ACTIVITIES: CPT | Mod: GP

## 2023-01-27 PROCEDURE — 85027 COMPLETE CBC AUTOMATED: CPT

## 2023-01-27 PROCEDURE — 90937 HEMODIALYSIS REPEATED EVAL: CPT

## 2023-01-27 PROCEDURE — 86481 TB AG RESPONSE T-CELL SUSP: CPT | Performed by: INTERNAL MEDICINE

## 2023-01-27 RX ORDER — POLYETHYLENE GLYCOL 3350 17 G/17G
17 POWDER, FOR SOLUTION ORAL 3 TIMES DAILY
Status: CANCELLED | OUTPATIENT
Start: 2023-01-27

## 2023-01-27 RX ORDER — BUMETANIDE 2 MG/1
2 TABLET ORAL 2 TIMES DAILY
Status: DISCONTINUED | OUTPATIENT
Start: 2023-01-27 | End: 2023-01-28 | Stop reason: HOSPADM

## 2023-01-27 RX ORDER — SPIRONOLACTONE 25 MG/1
25 TABLET ORAL 2 TIMES DAILY
Status: DISCONTINUED | OUTPATIENT
Start: 2023-01-27 | End: 2023-01-28 | Stop reason: HOSPADM

## 2023-01-27 RX ADMIN — PREDNISONE 10 MG: 5 TABLET ORAL at 07:58

## 2023-01-27 RX ADMIN — RIFAXIMIN 550 MG: 550 TABLET ORAL at 21:08

## 2023-01-27 RX ADMIN — Medication 1 MG: at 21:53

## 2023-01-27 RX ADMIN — RIFAXIMIN 550 MG: 550 TABLET ORAL at 07:57

## 2023-01-27 RX ADMIN — SODIUM CHLORIDE 250 ML: 9 INJECTION, SOLUTION INTRAVENOUS at 12:37

## 2023-01-27 RX ADMIN — Medication: at 12:37

## 2023-01-27 RX ADMIN — LACTULOSE 20 G: 20 SOLUTION ORAL at 07:58

## 2023-01-27 RX ADMIN — LACTULOSE 20 G: 20 SOLUTION ORAL at 16:01

## 2023-01-27 RX ADMIN — LACTULOSE 20 G: 20 SOLUTION ORAL at 21:07

## 2023-01-27 RX ADMIN — HEPARIN SODIUM 800 UNITS/HR: 10000 INJECTION, SOLUTION INTRAVENOUS at 03:56

## 2023-01-27 RX ADMIN — FOLIC ACID 5 MG: 1 TABLET ORAL at 07:58

## 2023-01-27 RX ADMIN — Medication 1 TABLET: at 07:58

## 2023-01-27 RX ADMIN — BUMETANIDE 2 MG: 2 TABLET ORAL at 21:08

## 2023-01-27 RX ADMIN — SODIUM CHLORIDE 300 ML: 9 INJECTION, SOLUTION INTRAVENOUS at 12:37

## 2023-01-27 RX ADMIN — POLYETHYLENE GLYCOL 3350 17 G: 17 POWDER, FOR SOLUTION ORAL at 07:58

## 2023-01-27 RX ADMIN — SPIRONOLACTONE 25 MG: 25 TABLET, FILM COATED ORAL at 21:08

## 2023-01-27 RX ADMIN — DILTIAZEM HYDROCHLORIDE 120 MG: 120 CAPSULE, COATED, EXTENDED RELEASE ORAL at 07:58

## 2023-01-27 RX ADMIN — PANTOPRAZOLE SODIUM 20 MG: 20 TABLET, DELAYED RELEASE ORAL at 07:58

## 2023-01-27 RX ADMIN — HEPARIN SODIUM 950 UNITS/HR: 10000 INJECTION, SOLUTION INTRAVENOUS at 09:06

## 2023-01-27 ASSESSMENT — ACTIVITIES OF DAILY LIVING (ADL)
ADLS_ACUITY_SCORE: 29

## 2023-01-27 NOTE — CONSULTS
Discharge Pharmacy Test Claim    Eliquis is covered with $0 copay through patient's commercial [employer] plan.    Test Claim Copay   eliquis 0.00       Cindy Salter  [facility] Pharmacy Liaison  Ph: [phone] Pager: [phone]   Securely message with the Vocera Web Console (learn more here)

## 2023-01-27 NOTE — PROVIDER NOTIFICATION
"   01/26/23 1700   Call Information   Date of Call 01/26/23   Time of Call 1701   Name of person requesting the team Joann   Title of person requesting team RN   RRT Arrival time 1703   Time RRT ended 1709   Reason for call   Type of RRT Adult   Primary reason for call Sepsis suspected  (LA 2.3)   Sepsis Suspected Elevated Lactate level;WBC <4 or >12   Was patient transferred from the ED, ICU, or PACU within last 24 hours prior to RRT call? No   SBAR   Situation Elevated LA   Background Per provider, \"hisory of alcohol/homozygous H63D cirrhosis complicated by EV/IGV s/p TIPS (10/1/22), HE, CKD, HTN, chronic a-fib, psoriatic arthritis, c-diff (most recent 1/15) who was admitted with decreased stool output and worsening mentation. He was noted to have + DVT in right leg\"   Notable History/Conditions End-Stage disease;Hypertension;Organ failure   Assessment VSS, sitting up in the chair and eating, talking with his wife.   Interventions Labs   Adjustments to Recommend VS more frequent for an hour.   Patient Outcome   Patient Outcome Stabilized on unit   RRT Team   Attending/Primary/Covering Physician James 2   Physician(s) Dandy Griffin   Lead RN Juan David   RT NA   Post RRT Intervention Assessment   Date Follow Up Done 01/26/23   Time Follow Up Done 2207   Comments Follow-up lactic acid unchanged at 2.3 at 1954       "

## 2023-01-27 NOTE — PROGRESS NOTES
Nephrology Progress Note  01/27/2023         Assessment & Recommendations:   Mr. 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 Patient was admitted 1/23 with three days of progressive confusion concerning for decompensated cirrhosis with hepatic encephalopathy. Found to have leukocytosis, DVT of RLE, and progressive CKD.     CKD5, now ESRD on iHD  AGMA  Patient with complex past history with cirrhosis, psoriatic arthritis, gout. His is s/p TIPS, and has a history of bleeding esophageal varices s/p banding procedures. He has a history of hepatic encephalopathy, with more frequent events since TIPS. He has a history of severe kidney injury, with a kidney biopsy in Sept 2022 which revealed severe intersititial fibrosis and glomerular IgA deposition but without overt necrotizing/crescentic glomerular. However, given positive MPO-ANCA and concern for potential unsampled vasculitis, oatient was started on steroids and received induction rituximab in Oct 2022. With recurrent PATI Cr has remained >4.5 since and even on the biopsy in Sept patient had significant evidence of advanced CKD with 70-80% IFTA noted. Given the report of severe intersitial fibrosis on biopsy, the likelihood of recovery of kidney function (now 4 + months since the biopsy, and 3 months since immunosuppression was started) is very unlikely. In the setting of a normal blood pressure, and with the ongoing workup for liver transplant, discussed hemodialysis as a bridge to transplantation with the option to discontinue if not tolerated or patient deemed not to be a candidate for a liver transplant; patient was agreeable.   - IR  "tunneled dialysis catheter placed 1/26  - HD today, again tomorrow  - Daily renal panel  - Avoid nephrotoxins  - Renally dose medications  - Daily weights  - Strict I/Os     Recommendations were communicated to primary team via note.     Seen and discussed with Dr. Scottie Nieto MD   Division of Renal Disease and Hypertension  [facility]  [email or web address]  Ernie Web Console    Interval History :   Nursing and provider notes from last 24 hours reviewed.  In the last 24 hours Damion Quinones remained clinically stable. Some oozing from TDC overnight but controlled. Denies any other concerns, feels mental status continues to get clear every day. No pain, SOB    Physical Exam:  I/O last 3 completed shifts:  In: 660 [P.O.:660]  Out: 700 [Urine:700]  /76 (BP Location: Left arm)   Pulse 73   Temp 98.4  F (36.9  C) (Oral)   Resp 20   Ht 1.727 m (5' 8\")   Wt 96.5 kg (212 lb 12.8 oz)   SpO2 99%   BMI 32.36 kg/m      GENERAL APPEARANCE: NAD  EYES: R conjunctival hemorrhage  PULM: breathing non-labored  CV: regular rhythm, normal rate, no rub     -edema ++   GI: soft, distended  NEURO:  AOx2, normal speech  Access: LIJ TDC with some oozing under dressing, area nontender    Labs:   All labs reviewed by me  Electrolytes/Renal - Recent Labs   Lab Test 01/27/23  0604 01/26/23  0538 01/25/23  0631   * 138 135*   POTASSIUM 4.4 4.4 4.4   CHLORIDE 102 103 101   CO2 17* 16* 14*   BUN 85.2* 86.7* 88.2*   CR 6.32* 6.38* 6.32*   * 87 117*   NAZARIO 8.8 9.1 8.9   MAG 1.9 2.2 2.0   PHOS 6.5* 6.2* 6.2*       CBC -   Recent Labs   Lab Test 01/27/23  0604 01/26/23  0538 01/25/23  0631   WBC 12.4* 13.8* 13.7*   HGB 11.1* 12.0* 11.6*   * 116* 99*       LFTs -   Recent Labs   Lab Test 01/27/23  0604 01/26/23  0538 01/25/23  0631   ALKPHOS 203* 210* 210*   BILITOTAL 0.7 0.9 0.9   ALT 44 49 47   AST 56* 53* 44   PROTTOTAL 4.8* 5.1* 5.1*   ALBUMIN 2.8* 3.0* 2.9*       Iron Panel -   Recent Labs   Lab Test 11/22/22  0848 "   IRON 68   IRONSAT 31   HOLA 429*       Imaging:  All imaging studies reviewed by me.     Current Medications:    bumetanide  2 mg Oral BID     diltiazem ER COATED BEADS  120 mg Oral Daily     folic acid  5 mg Oral Daily     sodium chloride (PF) 0.9%  1.3-2.6 mL Intracatheter Once    Followed by     heparin  3 mL Intracatheter Once     sodium chloride (PF) 0.9%  1.3-2.6 mL Intracatheter Once    Followed by     heparin  3 mL Intracatheter Once     lactulose  20 g Oral TID     multivitamin w/minerals  1 tablet Oral Daily     pantoprazole  20 mg Oral Daily     [Held by provider] polyethylene glycol  17 g Oral TID     predniSONE  10 mg Oral Daily     rifaximin  550 mg Oral BID     sodium chloride (PF)  10-40 mL Intracatheter Q8H     sodium chloride (PF)  3 mL Intracatheter Q8H     spironolactone  25 mg Oral BID       heparin 950 Units/hr (01/27/23 0906)     Que Nieto MD

## 2023-01-27 NOTE — PROGRESS NOTES
[facility]    Progress Note - Medicine Service, MAROON TEAM 2       Date of Admission:  1/23/2023    Assessment & Plan   Damion Quinones is a 67 year old male admitted on 1/23/2023. He has a PMH significant for EtOH associated cirrhosis c/b hepatic encephalopathy and esophageal varices s/p TIPS, paroxysmal atrial fibrillation, CKD, psoriatic arthritis, HTN, ANCA vasculitis, T2DM2, recent admission for c diff 1/15-1/16/23, now admitted for acute encephalopathy likely 2/2 hepatic encephalopathy.     Changes today:  - first session of HD today 1/27  - continue rifaximin and lactulose titrated to ~3-5 soft BMs daily  - continue high intensity heparin infusion for DVT  - pharmacy liaison consult for transitioning from heparin to apixaban  - OK for scheduled cardiac MRI on 1/30 as inpatient  - resume PTA diuretics  - TCU and outpatient HD set up per social work    Decompensated cirrhosis  Acute toxic metabolic encephalopathy, likely 2/2 hepatic encephalopathy, improving  Cirrhosis 2/2 EtOH use c/b hepatic encephalopathy, ascites, and varices s/p TIPS and variceal banding. Liver transplant evaluation is underway. Ammonia on admit 186; acute hepatic encephalopathy likely explanation for his presenting altered mental status. Precipitating cause for this is possibly infection (such as known c diff) vs poor clearance (BMs in general have been slowing down). No e/o GI bleeding currently. Additional encephalopathy workup includes CT Head (no acute abnormality).  No other major metabolic disturbances beyond baseline to explain his AMS. No hypoxia. Given clinical stability, holding empiric SBP treatment. Cautious with empiric abx in this patient unless obvious infection per earlier ID guidance (ex. avoid 3rd/4th gen cephalosporins and clindamycin whenever possible due to recurrent c diff). 1/23 UA with some pyuria but no symptoms, Bcx NGTD. TIPS US showed patent TIPS.  - Hepatology  consult, appreciate recs  - pet WNL  - PTA lactulose, titrate to ~3-5 stools per day.  - PTA rifaximin  - C diff treatment completed  - No additional empiric abx unless infectious workup is otherwise revealing  - Neuro checks and fall precautions  - Daily MELD labs    CKD 5 now ESRD on iHD (will be TThS)  Cr elevated above baseline of 5.3, meets PATI criteria. Likely is due to hypovolemia in setting of his c diff infection and poor PO intake since last admission. Also need to consider HRS. Consulted nephrology due to inadequate response to fluid challenge, appears that renal disease has progressed to ESRD and started on iHD.  - Received 1L LR on 1/23 and another liter bolus on 1/24  - strict I/Os  - Avoid nephrotoxins  - Resuming PTA diuretics now that hydration status improved  - Trend Cr  - Nephrology consult, appreciate recs    > 1/26 IR consult for tunneled dialysis catheter placement    > starting HD 1/27    > daily renal panel    > renally dose meds    > renal diet    > daily weights  - resuming PTA diuretics with careful monitoring     Right lower extremity DVT  Presented with right greater than left lower extremity swelling, present for 1-2 days prior to admission, progressively worsening. RLE appears about twice the size of LLE. Doppler US 1/23 showing DVT in right leg.   - heparin infusion  - pharmacy liaison consult for transitioning to apixaban prior to discharge    Recurrent c diff infection  Admitted recently for this. Hx of recurrence, at last admission was discharged on 10 day course of fidaxomicin per ID reccs. Course completed 1/25. Per ID, if requiring other abx would need ppx with oral vancomycin 125mg BID through course.  - Fidaximicin course completed through 1/25     Anion gap metabolic acidosis, improved  Anion gap of 19 on presentation, in setting of CKD and uremia.  - treatment of hepatic encephalopathy, as above     Paroxysmal afib, stable  Not anticoagulated outpatient.   - Continue PTA  "diltiazem     Psoriatic arthritis  On tapered steroid course, currently taking 10mg matt of prednisone  - continue pred taper  - consider PJP ppx if prolonged pred therapy       Diet: Regular Diet Adult    DVT Prophylaxis: on heparin infusion for DVT  Lux Catheter: Not present  Fluids: none  Lines: PRESENT      CVC Double Lumen Left Internal jugular Tunneled-Site Assessment: WDL except;Bleeding      Cardiac Monitoring: None  Code Status: Full Code      Clinically Significant Risk Factors             # Anion Gap Metabolic Acidosis: Highest Anion Gap = 19 mmol/L in last 2 days, will monitor and treat as appropriate  # Hypoalbuminemia: Lowest albumin = 2.8 g/dL at 1/27/2023  6:04 AM, will monitor as appropriate   # Thrombocytopenia: Lowest platelets = 106 in last 2 days, will monitor for bleeding          # Obesity: Estimated body mass index is 32.36 kg/m  as calculated from the following:    Height as of this encounter: 1.727 m (5' 8\").    Weight as of this encounter: 96.5 kg (212 lb 12.8 oz).          Disposition Plan      Expected Discharge Date: 01/30/2023    Discharge Delays: Dialysis - arrange outpatient  Destination: home with family;home with help/services  Discharge Comments: Dispo: Home with assist; home care PT/OT; OP HD  Delays: IP MRI Monday AM; needs final approval from dialysis center prior to discharge  Progress: Improved mentation. OP HD set-up        The patient's care was discussed with the Attending Physician, Dr. Samuels.    Rhianna Arriaga, MS3  Medical Student  Medicine Service, [address]  Securely message with Vocera (more info)  Text page via [facility] Paging/Directory   See signed in provider for up to date coverage information     I, Nishant Esteban MD, was present with the medical/GEORGE student who participated in the service and in the documentation of the note.  I have verified the history and personally performed the physical exam and " medical decision making.  I agree with the assessment and plan of care as documented in the note and have made edits as needed.     Nishant Esteban MD  PGY1  Date of Service (when I saw the patient): 1/27/23      ______________________________________________________________________    Interval History   Nursing notes reviewed. Pennie was called last night due to lactate elevated to 2.3. Got hemodialysis catheter placed yesterday and had some bleeding from the site, which was controlled. Patient oriented to self, place, time, and situation. Says that he feels good today, and is closer to being back to his baseline. Appetite has been good. Had at least 3 BMs yesterday.    Physical Exam   Vital Signs: Temp: 98.4  F (36.9  C) Temp src: Oral BP: 122/76 Pulse: 73   Resp: 20 SpO2: 99 % O2 Device: None (Room air)    Weight: 212 lbs 12.8 oz     General: Sitting in chair, awake, alert, NAD, wife at bedside  HEENT: NC/AT, EOMI, anicteric sclerae, MMM  Neck: Supple  CV: RRR, no murmurs  Respiratory: CTAB, breathing is non-labored  Abdomen: Distended, soft, non-tender to palpation  Ext: WWP, bilateral LE edema, worse on right  Skin: Warm, dry, no rashes  Neuro: Moves all extremities equally, AOx4    Medical Decision Making       Please see A&P for additional details of medical decision making.      Data   ------------------------- PAST 24 HR DATA REVIEWED -----------------------------------------------    I have personally reviewed the following data over the past 24 hrs:    12.4 (H)  \   11.1 (L)   / 106 (L)     135 (L) 102 85.2 (H) /  101 (H)   4.4 17 (L) 6.32 (H) \       ALT: 44 AST: 56 (H) AP: 203 (H) TBILI: 0.7   ALB: 2.8 (L) TOT PROTEIN: 4.8 (L) LIPASE: N/A       Procal: N/A CRP: N/A Lactic Acid: 1.9         Imaging results reviewed over the past 24 hrs:   No results found for this or any previous visit (from the past 24 hour(s)).

## 2023-01-27 NOTE — PLAN OF CARE
Patient condition slightly improving. Patient sitting on chair at the start of the shift, appeared calm and comfortable, verbally responsive, calm, cooperative with cares. On heparin drip at 800 unit(s)/hr via PIV infusing well. Dialysis catheter dressing with slight shadow of blood but no signs of emmanuel bleeding noted. No c/o pain or discomfort and no respiratory issues noted. Was seen and rounded by primary team. R led more swollen than the left. Heparin drip rate changed per protocol after hep 10 A level resulted. He was then transported to Hemodialysis unit for HD run and came back to unit in stable condition. Wife at bedside attentive to cares. Resting in bed as of this time, BLE elevated with pillows. No other calls made.    Goal Outcome Evaluation:      Plan of Care Reviewed With: patient    Overall Patient Progress: improvingOverall Patient Progress: improving

## 2023-01-27 NOTE — DISCHARGE INSTRUCTIONS
Hospital Follow-up Primary Care Visit:    You have a hospital follow-up visit scheduled at [facility] in [place] with Dr. Santiago Serrano on 23 at 3:10pm. Please ensure you attend as your home care can only begin after you attend this appointment.    Home Care Agency:    You have been accepted by this  [facility] agency (Ph: [phone]; [phone]) for Physical and Occupation Therapies. They will be able to begin services only after you attend your primary care appointment listed above.      Outpatient Hemodialysis Center:    You have been set up with outpatient hemodialysis through [facility] ([address], [place], [address]; Telephone: [phone] for T//S AM. Start date is .

## 2023-01-27 NOTE — PLAN OF CARE
Goal Outcome Evaluation:  Pt A/O x4, no pain reported.Oral intake was good Pt voided an adequate amount, no BM during shift.  Pt. return from IR around 1600. Sepsis was triggered per VS, Lactic acid lab values 2.3. RRT and provider were notifed. RRT and Provider assessed pt at bedside no interventions were provided, lactic acid values re-check ordered per provider. Pt experienced significant bleeding from CVC site, new clot activating dressing applied by resource nurse. Pressure also applied to CVC site. Spoke with MD Samuels, who recommends holding lactulose for the remaining of the night until tomorrows scheduled dose. Dialysis is scheduled to begin tomorrow. Continue to monitor [place] per protocol. Heparin drip at 800 units/hr, next 10a level in AM. Monitor for continued blleding of CVC site.           Overall Patient Progress: improvingOverall Patient Progress: improving    Outcome Evaluation: Monitor CVC site and LOC

## 2023-01-27 NOTE — PLAN OF CARE
Time of care 5622-7934    67 y.o. male admitted with hepatic encephalopathy. Hx: cirrhosis, A. Fib, CKD, DVT and DM II (no BG checks). He is on [place], Hep gtt, and he has a new CVC that has been bleeding in the previous shift. Starting dialysis tomorrow. RPIV running heparin drip and LPIV Na+ locked. Strict I&O, lactic has been elevated but the M.D. is not performing  interventions at this time. VS and neuro checks q4, regular diet, no BM on previous shift ([id number]) The providers plan is to decrease scheduled lactulose and discontinue morning bumex. He is alert and oriented and able to make needs known. He is medically stable, continue to monitor for changes and adhere to POC.    Goal Outcome Evaluation: Ongoing, improving    Plan of Care Reviewed With: patient    Overall Patient Progress: improving

## 2023-01-27 NOTE — PROGRESS NOTES
Care Management Follow Up    Length of Stay (days): 4  Expected Discharge Date: 2023  Concerns to be Addressed: discharge planning, adjustment to diagnosis/illness     Patient plan of care discussed at interdisciplinary rounds: Yes  Anticipated Discharge Disposition: Home, Transitional Care    [facility] (Ph: [phone]; [phone]; F: [phone])  Pt has been accepted for PT/OT. Home care agency needs pt to attend his PCP appointment on Wednesday at 3:10pm prior to the home care agency starting services. Please sent home care orders, discharge orders, and PCP info and date of initial appointment at discharge.     Anticipated Discharge Services: Outpatient Dialysis    [facility] (Telephone: [phone]; Fax: [phone])  [address] [facility] [address], [place], [address]  T/Th/S Morning  : Preliminary documents submitted. Pt pt preliminarily accepted for a [facility] T//S schedule in the AM, however Nephrology order & HD nurse notes will need to be updated in the [facility] on Monday. Please ensure that pt is fully accepted on Monday prior to discharge.    Anticipated Discharge DME: None  Patient/family educated on Medicare website which has current facility and service quality ratings: yes  Education Provided on the Discharge Plan: yes   Patient/Family in Agreement with the Plan: yes  Private pay costs discussed: Not applicable  Additional Information: This patient has been preliminarily accepted to [facility] in [facility] for HD on T//S mornings. [facility] faxed over updated notes. Nephrology order & hemodialysis notes will need to be submitted to the online [facility] on Monday and final confirmation should be ensured prior to pt discharging from the hospital. [facility] informed the patient and his wife where he will be dialyzing. Pt's wife will be providing rides to and from dialysis.    [facility] called & set up the patient with a STAT PCP appointment through  [facility] with Dr. Santiago Serrano on 2/1/23 at 3:10pm. Pts who require home care need to have an active PCP in order to start home care.      Pt & wife decided that they would like the patient to go home with assist by wife and family and with home care. SW called several of the home care agencies that service the patient's home area. [facility] was able to accept the patient for home PT and OT. They can start services AFTER the patient's scheduled PCP appointment on 2/1/23 at 3:10pm.  __________________________     MOUNIKA Hernandez, LICSW  Advanced Practice Independent Clinical   [facility]   [address] 5B Medicine Beds 9518-8141 & 5C Medicine Overflow Beds 1591-2355  [email or web address]  Phone: [phone]  Pager: [phone]  Fax: [phone]  Message me on [employer] liss.    Weekend 5A & 5B  Pager: [phone]   Weekend 5A & 5B RNCC Pager: [phone]  Weekdays after hours (1630 - 0000), Saturday & Sunday after hours (5614-2239), & [facility] Recognized Holidays Coverage  Pager: [phone]

## 2023-01-27 NOTE — PROVIDER NOTIFICATION
Provider notified (name): JORDAN Joel   Reason for notification: Tunneled CVC site bleeding, dressing soaked. Pt on sontin  Recommendation/request given to provider: Direction to control bleeding  Response from provider: Apply pressure dressing to site.

## 2023-01-27 NOTE — PROGRESS NOTES
HEMODIALYSIS TREATMENT NOTE    Date: 1/27/2023  Time: 1:52 PM    Data:  Pre Wt: 96.8 kg    Desired Wt: 95.8   kg   Post Wt:  95.8 kg  Weight change: 1  kg  Ultrafiltration - Post Run Net Total Removed (mL):  1 L  Vascular Access Status: patent  Dialyzer Rinse:  clear  Total Blood Volume Processed: 29.76 Liters  Total Dialysis (Treatment) Time: 2.5  Hours    Lab:   No    Interventions:  Pt. Dialyzed for 2.5hrs via CVC with BFR:200, DFR: 400 and dialysate bath of K:3 and Ca:2.25. UF set for 1L. Pt tolerate HD well.   Turn over by Jeri PADGETT 1.5 hr before his off time.   CVC dressing changed by Jeri PADGETT  CVC lumen locked with saline and capped with clear guard  Handoff report given to LORIN Nicholson    Assessment:  General edema noted  No SOB, SPO2 is 100%  MT is 70 AP, on sinus rhythm  VS stable aside from HTN pre and post run.  CVC is intact, dry and clean  No BM during his HD     Plan:    Per renal team

## 2023-01-28 ENCOUNTER — APPOINTMENT (OUTPATIENT)
Dept: OCCUPATIONAL THERAPY | Facility: CLINIC | Age: 68
DRG: 441 | End: 2023-01-28
Attending: INTERNAL MEDICINE
Payer: MEDICARE

## 2023-01-28 ENCOUNTER — APPOINTMENT (OUTPATIENT)
Dept: PHYSICAL THERAPY | Facility: CLINIC | Age: 68
DRG: 441 | End: 2023-01-28
Payer: MEDICARE

## 2023-01-28 VITALS
OXYGEN SATURATION: 100 % | BODY MASS INDEX: 32.25 KG/M2 | HEART RATE: 80 BPM | DIASTOLIC BLOOD PRESSURE: 81 MMHG | RESPIRATION RATE: 14 BRPM | HEIGHT: 68 IN | SYSTOLIC BLOOD PRESSURE: 122 MMHG | TEMPERATURE: 98 F | WEIGHT: 212.8 LBS

## 2023-01-28 LAB
ALBUMIN SERPL BCG-MCNC: 2.7 G/DL (ref 3.5–5.2)
ALP SERPL-CCNC: 193 U/L (ref 40–129)
ALT SERPL W P-5'-P-CCNC: 28 U/L (ref 10–50)
ANION GAP SERPL CALCULATED.3IONS-SCNC: 15 MMOL/L (ref 7–15)
AST SERPL W P-5'-P-CCNC: 43 U/L (ref 10–50)
BACTERIA BLD CULT: NO GROWTH
BACTERIA BLD CULT: NO GROWTH
BILIRUB SERPL-MCNC: 0.7 MG/DL
BUN SERPL-MCNC: 58.4 MG/DL (ref 8–23)
CALCIUM SERPL-MCNC: 8.7 MG/DL (ref 8.8–10.2)
CHLORIDE SERPL-SCNC: 99 MMOL/L (ref 98–107)
CREAT SERPL-MCNC: 4.85 MG/DL (ref 0.67–1.17)
DEPRECATED HCO3 PLAS-SCNC: 19 MMOL/L (ref 22–29)
ERYTHROCYTE [DISTWIDTH] IN BLOOD BY AUTOMATED COUNT: 14.9 % (ref 10–15)
GAMMA INTERFERON BACKGROUND BLD IA-ACNC: 0.08 IU/ML
GFR SERPL CREATININE-BSD FRML MDRD: 13 ML/MIN/1.73M2
GLUCOSE SERPL-MCNC: 84 MG/DL (ref 70–99)
HCT VFR BLD AUTO: 35.9 % (ref 40–53)
HGB BLD-MCNC: 11.3 G/DL (ref 13.3–17.7)
M TB IFN-G BLD-IMP: NEGATIVE
M TB IFN-G CD4+ BCKGRND COR BLD-ACNC: 9.92 IU/ML
MAGNESIUM SERPL-MCNC: 1.8 MG/DL (ref 1.7–2.3)
MCH RBC QN AUTO: 28.8 PG (ref 26.5–33)
MCHC RBC AUTO-ENTMCNC: 31.5 G/DL (ref 31.5–36.5)
MCV RBC AUTO: 91 FL (ref 78–100)
MITOGEN IGNF BCKGRD COR BLD-ACNC: -0.02 IU/ML
MITOGEN IGNF BCKGRD COR BLD-ACNC: 0.01 IU/ML
PHOSPHATE SERPL-MCNC: 5.6 MG/DL (ref 2.5–4.5)
PLATELET # BLD AUTO: 106 10E3/UL (ref 150–450)
POTASSIUM SERPL-SCNC: 4.3 MMOL/L (ref 3.4–5.3)
PROT SERPL-MCNC: 4.8 G/DL (ref 6.4–8.3)
QUANTIFERON MITOGEN: 10 IU/ML
QUANTIFERON NIL TUBE: 0.08 IU/ML
QUANTIFERON TB1 TUBE: 0.09 IU/ML
QUANTIFERON TB2 TUBE: 0.06
RBC # BLD AUTO: 3.93 10E6/UL (ref 4.4–5.9)
SODIUM SERPL-SCNC: 133 MMOL/L (ref 136–145)
UFH PPP CHRO-ACNC: 0.28 IU/ML
UFH PPP CHRO-ACNC: 0.48 IU/ML
UFH PPP CHRO-ACNC: 0.78 IU/ML
WBC # BLD AUTO: 13.3 10E3/UL (ref 4–11)

## 2023-01-28 PROCEDURE — 36415 COLL VENOUS BLD VENIPUNCTURE: CPT | Performed by: INTERNAL MEDICINE

## 2023-01-28 PROCEDURE — 99238 HOSP IP/OBS DSCHRG MGMT 30/<: CPT | Mod: GC | Performed by: INTERNAL MEDICINE

## 2023-01-28 PROCEDURE — 258N000003 HC RX IP 258 OP 636: Performed by: INTERNAL MEDICINE

## 2023-01-28 PROCEDURE — 85520 HEPARIN ASSAY: CPT | Performed by: INTERNAL MEDICINE

## 2023-01-28 PROCEDURE — 83735 ASSAY OF MAGNESIUM: CPT

## 2023-01-28 PROCEDURE — 250N000013 HC RX MED GY IP 250 OP 250 PS 637: Performed by: STUDENT IN AN ORGANIZED HEALTH CARE EDUCATION/TRAINING PROGRAM

## 2023-01-28 PROCEDURE — 36415 COLL VENOUS BLD VENIPUNCTURE: CPT

## 2023-01-28 PROCEDURE — 250N000012 HC RX MED GY IP 250 OP 636 PS 637: Performed by: STUDENT IN AN ORGANIZED HEALTH CARE EDUCATION/TRAINING PROGRAM

## 2023-01-28 PROCEDURE — 97116 GAIT TRAINING THERAPY: CPT | Mod: GP

## 2023-01-28 PROCEDURE — 90937 HEMODIALYSIS REPEATED EVAL: CPT

## 2023-01-28 PROCEDURE — 97140 MANUAL THERAPY 1/> REGIONS: CPT | Mod: GO

## 2023-01-28 PROCEDURE — 250N000011 HC RX IP 250 OP 636

## 2023-01-28 PROCEDURE — 84100 ASSAY OF PHOSPHORUS: CPT

## 2023-01-28 PROCEDURE — 85027 COMPLETE CBC AUTOMATED: CPT

## 2023-01-28 PROCEDURE — 99233 SBSQ HOSP IP/OBS HIGH 50: CPT | Mod: 24 | Performed by: INTERNAL MEDICINE

## 2023-01-28 PROCEDURE — 250N000013 HC RX MED GY IP 250 OP 250 PS 637

## 2023-01-28 PROCEDURE — 86706 HEP B SURFACE ANTIBODY: CPT | Performed by: INTERNAL MEDICINE

## 2023-01-28 PROCEDURE — 80053 COMPREHEN METABOLIC PANEL: CPT

## 2023-01-28 RX ORDER — HEPARIN SODIUM 10000 [USP'U]/100ML
0-5000 INJECTION, SOLUTION INTRAVENOUS CONTINUOUS
Status: DISPENSED | OUTPATIENT
Start: 2023-01-28 | End: 2023-01-28

## 2023-01-28 RX ADMIN — DILTIAZEM HYDROCHLORIDE 120 MG: 120 CAPSULE, COATED, EXTENDED RELEASE ORAL at 07:57

## 2023-01-28 RX ADMIN — SODIUM CHLORIDE 250 ML: 9 INJECTION, SOLUTION INTRAVENOUS at 15:04

## 2023-01-28 RX ADMIN — LACTULOSE 20 G: 20 SOLUTION ORAL at 07:58

## 2023-01-28 RX ADMIN — FOLIC ACID 5 MG: 1 TABLET ORAL at 07:57

## 2023-01-28 RX ADMIN — HEPARIN SODIUM 1100 UNITS/HR: 10000 INJECTION, SOLUTION INTRAVENOUS at 10:58

## 2023-01-28 RX ADMIN — SPIRONOLACTONE 25 MG: 25 TABLET, FILM COATED ORAL at 07:57

## 2023-01-28 RX ADMIN — Medication: at 15:05

## 2023-01-28 RX ADMIN — SODIUM CHLORIDE 300 ML: 9 INJECTION, SOLUTION INTRAVENOUS at 15:04

## 2023-01-28 RX ADMIN — PANTOPRAZOLE SODIUM 20 MG: 20 TABLET, DELAYED RELEASE ORAL at 07:57

## 2023-01-28 RX ADMIN — RIFAXIMIN 550 MG: 550 TABLET ORAL at 07:57

## 2023-01-28 RX ADMIN — Medication 1 TABLET: at 07:57

## 2023-01-28 RX ADMIN — BUMETANIDE 2 MG: 2 TABLET ORAL at 07:57

## 2023-01-28 RX ADMIN — PREDNISONE 10 MG: 5 TABLET ORAL at 07:57

## 2023-01-28 RX ADMIN — APIXABAN 10 MG: 5 TABLET, FILM COATED ORAL at 18:39

## 2023-01-28 RX ADMIN — HEPARIN SODIUM 950 UNITS/HR: 10000 INJECTION, SOLUTION INTRAVENOUS at 04:22

## 2023-01-28 ASSESSMENT — ACTIVITIES OF DAILY LIVING (ADL)
ADLS_ACUITY_SCORE: 33
ADLS_ACUITY_SCORE: 33
ADLS_ACUITY_SCORE: 29
ADLS_ACUITY_SCORE: 33
ADLS_ACUITY_SCORE: 29
ADLS_ACUITY_SCORE: 33
ADLS_ACUITY_SCORE: 33

## 2023-01-28 NOTE — PROGRESS NOTES
01/28/23 1300   Appointment Info   Signing Clinician's Name / Credentials (OT) Julieta Aldana, OTR/L   Edema General Information   Onset of Edema   (acute on chronic)   Affected Body Part(s) Left LE;Right LE   Edema Etiology Other (see comments)   Etiology Comments liver cirrhosis, CKD on dialysis, hypervolemia, decreased muscle pump activation   Edema Precautions Acute DVT  (elevated creatinine)   Edema Precaution Comments Heparin therapeutic for RLE DVT, creatinine trending down.   General Comments/Previous Edema Treatment/Edema Equipment No previous edema therapy noted.   Edema Examination/Assessment   Skin Condition Pitting;Dryness;Intact   Skin Condition Comments Skin intact but dry, shiny, and taut with 2-3+ pitting edema present from MTPs to knee creases, edema worse distally in feet and ankles, very bulbous in appearance. Skin karli, RLE edema worse than LLE.   Scar No   Ulcerations No   Skin Integrity Intact, dry, and taut.   Pitting Assessment 2-3+   Clinical Impression   Criteria for Skilled Therapeutic Interventions Met (OT) Yes, treatment indicated   OT Diagnosis Increased BLE edema impacting optimal functional mobility and ADL performance.   Edema: Patient Presentation Edema   Edema: Planned Interventions Gradient compression bandaging;Fit for compression garment;Education   Clinical Decision Making Complexity (OT) low complexity   Risk & Benefits of therapy have been explained evaluation/treatment results reviewed;care plan/treatment goals reviewed;risks/benefits reviewed;current/potential barriers reviewed;participants voiced agreement with care plan;participants included;patient;spouse/significant other   Clinical Impression Comments Pt presents to OT today with increased BLE edema, impacting optimal functional mobility and ADL performance. Pt to benefit from skilled edema services to address the previous mentioned problem list.   OT Total Evaluation Time   OT Rani Low Complexity Minutes (07345) 4    OT Goals   Therapy Frequency (OT) 4 times/wk  (edema)   OT Predicted Duration/Target Date for Goal Attainment 02/04/23   OT: Edema education to increase ability to manage edema after discharge from the hospital Patient;Caregiver;Demonstrate;[place];Skin care routine;signs/symptoms of intolerance;wear schedule;limb positioning;garrnet/bandage care;discharge recommendations   OT: Management of edema bandages Patient;Caregiver;Demonstrate;[place];quick wrap;garment(s)   Total Session Time   Total Session Time (sum of timed and untimed services) 4

## 2023-01-28 NOTE — DISCHARGE SUMMARY
Dialysis Discharge Summary Brief    [facility]  Division of Nephrology  Nephrology Discharge Dialysis Orders  Ph: [phone]  Fax: [phone]    Damion Quinones  MRN: [id number]  YOB: 1955    [facility]: [facility]   (Telephone: [phone]; Fax: [phone])    Date of Admission: 1/23/2023  Date of Discharge: 1/28/2023  Discharge Diagnoses:    Mr. 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 Patient was admitted 1/23 with three days of progressive confusion concerning for decompensated cirrhosis with hepatic encephalopathy. Found to have leukocytosis, DVT of RLE, and progressive CKD.     CKD5, now ESRD on iHD  AGMA  Patient with complex past history with cirrhosis, psoriatic arthritis, gout. His is s/p TIPS, and has a history of bleeding esophageal varices s/p banding procedures. He has a history of hepatic encephalopathy, with more frequent events since TIPS. He has a history of severe kidney injury, with a kidney biopsy in Sept 2022 which revealed severe intersititial fibrosis and glomerular IgA deposition but without overt necrotizing/crescentic glomerular. However, given positive MPO-ANCA and concern for potential unsampled vasculitis, oatient was started on steroids and received induction rituximab in Oct 2022. With recurrent PATI Cr has remained >4.5 since and even on the biopsy in Sept patient had significant evidence of advanced CKD with 70-80% IFTA noted. Given the report of severe intersitial fibrosis on biopsy, the likelihood of recovery of kidney function (now 4 + months since the biopsy, and 3  months since immunosuppression was started) is very unlikely. In the setting of a normal blood pressure, and with the ongoing workup for liver transplant, discussed hemodialysis as a bridge to transplantation with the option to discontinue if not tolerated or patient deemed not to be a candidate for a liver transplant; patient was agreeable.   - Tunneled dialysis catheter placed 1/26 by IR  - iHD started 1/27, prescription as below    Decompensated cirrhosis  Acute toxic metabolic encephalopathy, likely 2/2 hepatic encephalopathy, improving  Cirrhosis 2/2 EtOH use c/b hepatic encephalopathy, ascites, and varices s/p TIPS and variceal banding. Liver transplant evaluation is underway. Ammonia on admit 186; acute hepatic encephalopathy likely explanation for his presenting altered mental status. Precipitating cause for this is possibly infection (such as known c diff) vs poor clearance (BMs in general have been slowing down). No e/o GI bleeding currently. Additional encephalopathy workup includes CT Head (no acute abnormality).  No other major metabolic disturbances beyond baseline to explain his AMS. No hypoxia. Given clinical stability, holding empiric SBP treatment. Cautious with empiric abx in this patient unless obvious infection per earlier ID guidance (ex. avoid 3rd/4th gen cephalosporins and clindamycin whenever possible due to recurrent c diff). 1/23 UA with some pyuria but no symptoms, Bcx NGTD. TIPS US showed patent TIPS.  - Hepatology consult, appreciate recs  - peth WNL  - PTA lactulose, titrate to ~3-5 stools per day.  - PTA rifaximin  - C diff treatment completed  - No additional empiric abx unless infectious workup is otherwise revealing  - Continue bumex 2 mg BID and spironolactone 25 mg BID    Right lower extremity DVT  Presented with right greater than left lower extremity swelling, present for 1-2 days prior to admission, progressively worsening. RLE appears about twice the size of LLE. Doppler US  1/23 showing DVT in right leg. On heparin infusion while inpatient    Recurrent c diff infection  Admitted recently for this. Hx of recurrence, at last admission was discharged on 10 day course of fidaxomicin per ID reccs. Course completed 1/25. Per ID, if requiring other abx would need ppx with oral vancomycin 125mg BID through course.  - Fidaximicin course completed through 1/25     Anion gap metabolic acidosis, improved  Anion gap of 19 on presentation, in setting of CKD and uremia.  - treatment of hepatic encephalopathy, as above     Paroxysmal afib, stable  Not anticoagulated outpatient.   - Continue PTA diltiazem     Psoriatic arthritis  On tapered steroid course, currently taking 10mg matt of prednisone  - continue pred taper  - consider PJP ppx if prolonged pred therapy    [X] New initiation, new dialysis orders will be faxed.    New Orders (if not applicable put NA):  Estimated Dry Weight TBC, continues to challenge   Dialysis Duration 3.5 hrs   Dialysis Access LIJ tunneled catheter   Antibiotics (dose per dialysis, end date) NA           Labs to be drawn at dialysis Per protocol   Other major changes to dialysis prescription (e.g. Dialysate bath, heparin, blood flow rate, etc)   , , no heparin, 3K/2.5Ca   Medication changes (also fax the unit a copy of the discharge summary)         Please see separate medicine discharge note     Labs:   Electrolytes/Renal - Recent Labs   Lab Test 01/28/23 0636 01/27/23  0604 01/26/23  0538   * 135* 138   POTASSIUM 4.3 4.4 4.4   CHLORIDE 99 102 103   CO2 19* 17* 16*   BUN 58.4* 85.2* 86.7*   CR 4.85* 6.32* 6.38*   GLC 84 101* 87   NAZARIO 8.7* 8.8 9.1   MAG 1.8 1.9 2.2   PHOS 5.6* 6.5* 6.2*       CBC -   Recent Labs   Lab Test 01/28/23 0636 01/27/23  0604 01/26/23  0538   WBC 13.3* 12.4* 13.8*   HGB 11.3* 11.1* 12.0*   * 106* 116*       LFTs -   Recent Labs   Lab Test 01/28/23 0636 01/27/23  0604 01/26/23  0538   ALKPHOS 193* 203* 210*   BILITOTAL  0.7 0.7 0.9   ALT 28 44 49   AST 43 56* 53*   PROTTOTAL 4.8* 4.8* 5.1*   ALBUMIN 2.7* 2.8* 3.0*       Iron Panel -   Recent Labs   Lab Test 11/22/22  0848   IRON 68   IRONSAT 31   HOLA 429*     Current Medications:    sodium chloride 0.9%  250 mL Intravenous Once in dialysis/CRRT     sodium chloride 0.9%  300 mL Hemodialysis Machine Once     bumetanide  2 mg Oral BID     diltiazem ER COATED BEADS  120 mg Oral Daily     folic acid  5 mg Oral Daily     sodium chloride (PF) 0.9%  1.3-2.6 mL Intracatheter Once    Followed by     heparin  3 mL Intracatheter Once     sodium chloride (PF) 0.9%  1.3-2.6 mL Intracatheter Once    Followed by     heparin  3 mL Intracatheter Once     lactulose  20 g Oral TID     multivitamin w/minerals  1 tablet Oral Daily     - MEDICATION INSTRUCTIONS -   Does not apply Once     pantoprazole  20 mg Oral Daily     [Held by provider] polyethylene glycol  17 g Oral TID     predniSONE  10 mg Oral Daily     rifaximin  550 mg Oral BID     sodium chloride (PF)  10-40 mL Intracatheter Q8H     sodium chloride (PF)  3 mL Intracatheter Q8H     sodium chloride (PF)  9 mL Intracatheter During Dialysis/CRRT (from stock)     sodium chloride (PF)  9 mL Intracatheter During Dialysis/CRRT (from stock)     spironolactone  25 mg Oral BID       heparin 1,100 Units/hr (01/28/23 4312)     Name of physician completing this form: Que Nieto MD

## 2023-01-28 NOTE — DISCHARGE SUMMARY
[facility]  Discharge Summary - Medicine & Pediatrics       Date of Admission:  1/23/2023  Date of Discharge:  1/28/2023  Discharging Provider: Dr. Samuels  Discharge Service: Medicine Service, [facility]ALIA TEAM 2    Discharge Diagnoses   Decompensated cirrhosis  Hepatic encephalopathy  CKD 5 now ESRD on iHD (will be TThS)  Right lower extremity DVT  Recurrent c diff infection  Anion gap metabolic acidosis  Paroxysmal afib  Psoriatic arthritis    Follow-ups Needed After Discharge   - Follow-up with primary care doctor within 1 week regarding new diagnoses, including ESRD requiring HD and DVT requiring anticoagulation  - PCP to determine length of prednisone taper (takes for his psoriatic arthritis)  - Follow-up with nephrology, dialysis    Unresulted Labs Ordered in the Past 30 Days of this Admission     Date and Time Order Name Status Description    1/28/2023  2:29 PM Quantiferon TB Gold Plus In process     1/28/2023 11:44 AM Hepatitis B Surface Antibody In process       These results will be followed up by PCP    Discharge Disposition   Discharged to home  Condition at discharge: Stable    Hospital Course   Damion Quinones was admitted on 1/23/2023 for acute encephalopathy likely 2/2 hepatic encephalopathy. The following problems were addressed during his hospitalization:    Decompensated cirrhosis  Acute toxic metabolic encephalopathy, likely 2/2 hepatic encephalopathy, improved  Cirrhosis 2/2 EtOH use c/b hepatic encephalopathy, ascites, and varices s/p TIPS and variceal banding. Liver transplant evaluation is underway. Ammonia on admit 186; acute hepatic encephalopathy likely explanation for his presenting altered mental status. TIPS US showed patent TIPS. Treated with PTA lactulose, rifaximin, and miralax with significant clinical improvement, back to cognitive baseline upondischarge.     CKD 5 now ESRD on iHD (will be TThS)  Consulted nephrology due to inadequate response  to fluid challenge, appears that renal disease has progressed to ESRD and started on iHD. 1/26 IR placed tunneled dialysis catheter, first HD session on 1/27. Resumed PTA diuretics once hydration status improved. Plan for outpatient dialysis T/Th/S at [facility].     Right lower extremity DVT, provoked  Presented with right greater than left lower extremity swelling, present for 1-2 days prior to admission, progressively worsening. RLE appears about twice the size of LLE. Doppler US 1/23 showing DVT in right leg. Treated with high intensity heparin infusion while inpatient, transitioned to apixaban prior to discharge. Patient to take apixaban 10 mg BID x 1 days (last , then take 5 mg BID. Follow up with PCP for further management, anticipate treatment for at least 3 months.     Recurrent c diff infection  Admitted recently for this. Hx of recurrence, at last admission was discharged on 10 day course of fidaxomicin per ID reccs. Course completed 1/25, symptoms have improved     Anion gap metabolic acidosis, improved  Anion gap of 19 on presentation, in setting of CKD and uremia. Resolved by discharge.     Paroxysmal afib, stable  Continue PTA diltiazem     Psoriatic arthritis  On tapered steroid course, currently taking 10mg matt of prednisone. Continue pred taper, consider PJP ppx if prolonged pred therapy. Taper per patient PCP.    Consultations This Hospital Stay   GI HEPATOLOGY ADULT IP CONSULT  INFECTIOUS DISEASE GENERAL ADULT IP CONSULT  PHYSICAL THERAPY ADULT IP CONSULT  OCCUPATIONAL THERAPY ADULT IP CONSULT  PHARMACY IP CONSULT  PHARMACY IP CONSULT  NURSING TO CONSULT FOR VASCULAR ACCESS CARE IP CONSULT  NEPHROLOGY GENERAL ADULT IP CONSULT  NURSING TO CONSULT FOR VASCULAR ACCESS CARE IP CONSULT  NURSING TO CONSULT FOR VASCULAR ACCESS CARE IP CONSULT  INTERVENTIONAL RADIOLOGY ADULT/PEDS IP CONSULT  CARE MANAGEMENT / SOCIAL WORK IP CONSULT  PHARMACY LIAISON FOR MEDICATION COVERAGE CONSULT    Code Status    Full Code     The patient was discussed with Dr. Arvind Arriaga, MS3  Medical Student  [facility] Team  [facility] [address] [address]  Phone: [phone]    I, Nishant Esteban MD, was present with the medical/GEORGE student who participated in the service and in the documentation of the note.  I have verified the history and personally performed the physical exam and medical decision making.  I agree with the assessment and plan of care as documented in the note and have made edits as needed.     Nishant Esteban MD  PGY1  Date of Service (when I saw the patient): 1/28/2023    ______________________________________________________________________    Physical Exam   Vital Signs: Temp: 98.7  F (37.1  C) Temp src: Oral BP: 133/80 Pulse: 76   Resp: 20 SpO2: 100 % O2 Device: None (Room air)    Weight: 212 lbs 12.8 oz     General: Standing up, awake, alert, appears in good spirits today, NAD, wife is present  HEENT: NC/AT, EOMI, anicteric sclerae, MMM  Neck: Supple  CV: RRR, no murmurs  Respiratory: CTAB, breathing is non-labored  Abdomen: Distended, soft, non-tender to palpation  Ext: WWP, bilateral LE edema, worse on right  Skin: Warm, dry, no rashes  Neuro: Moves all extremities equally, AOx4, improved mental status      Primary Care Physician   Gary Serrano    Discharge Orders       Significant Results and Procedures   Results for orders placed or performed during the hospital encounter of 01/23/23   CT Head w/o Contrast    Narrative    EXAM: CT HEAD W/O CONTRAST  LOCATION: [facility]  DATE/TIME: 1/23/2023 2:49 AM    INDICATION: Altered mental status  COMPARISON: And 24 2022  TECHNIQUE: Routine CT Head without IV contrast. Multiplanar reformats. Dose reduction techniques were used.    FINDINGS:  INTRACRANIAL CONTENTS: No intracranial hemorrhage, extraaxial collection, or mass effect.  No CT evidence of acute infarct. Mild  volume loss and presumed chronic small vessel ischemia are unchanged. Encephalomalacia in the anterior inferior right frontal   lobe again noted.     VISUALIZED ORBITS/SINUSES/MASTOIDS: No intraorbital abnormality. No paranasal sinus mucosal disease. No middle ear or mastoid effusion.    BONES/SOFT TISSUES: No acute abnormality.      Impression    IMPRESSION:  1.  No acute intracranial abnormality or significant change compared to the prior study.   CT Abdomen Pelvis w/o Contrast    Narrative    EXAM: CT ABDOMEN PELVIS W/O CONTRAST  LOCATION: [facility]  DATE/TIME: 1/23/2023 2:49 AM    INDICATION: Abdominal pain.  COMPARISON: 1/15/2023.  TECHNIQUE: CT scan of the abdomen and pelvis was performed without IV contrast. Multiplanar reformats were obtained. Dose reduction techniques were used.  CONTRAST: None.    FINDINGS:   LOWER CHEST: Mild atelectasis at the lung bases. There are coronary artery atherosclerotic calcifications. The heart size is normal.    HEPATOBILIARY: Cirrhotic liver. TIPS. No calcified gallstone.    PANCREAS: Normal.    SPLEEN: Normal.    ADRENAL GLANDS: Normal.    KIDNEYS/BLADDER: No significant change in a small subcapsular hematoma at the posterior aspect of the left kidney. No hydronephrosis.    BOWEL: There are colonic diverticula without acute diverticulitis. There is a paraumbilical hernia containing small bowel loops but not causing obstruction. There are metallic clips in the stomach and vascular coils posterior to the gastric fundus. There   is no free intraperitoneal gas or fluid.    LYMPH NODES: Normal.    VASCULATURE: Atherosclerotic calcification of the aorta and its branches. No aneurysm.    PELVIC ORGANS: Normal.    MUSCULOSKELETAL: Degenerative disease throughout the spine.      Impression    IMPRESSION:   1.  No acute abnormality.  2.  Paraumbilical hernia containing small bowel loops without obstruction.  3.  Cirrhotic liver with  TIPS.  4.  No significant change in subcapsular hematoma at the posterior aspect of the left kidney.     US Abdomen Limited with TIPSS Doppler    Narrative    ULTRASOUND ABDOMEN LIMITED WITH TIPS DOPPLER 1/23/2023 3:24 PM    CLINICAL HISTORY: 66 yo with decompensated cirrhosis, prior TIPSS,  here with acute hepatic encephalopathy.     COMPARISONS: Ultrasound abdomen complete with TIPS Doppler 11/22/2022    REFERRING PROVIDER: JAMIE FRIED    TECHNIQUE: Right upper quadrant abdominal viscera evaluated with  grayscale imaging. TIPS, inferior vena cava and hepatic vasculature  evaluated with color Doppler and Doppler waveform ultrasound.    FINDINGS: Limited visualization due to patient body habitus and  inability to cooperate.    Liver: Limited visualization. 12.0 cm.  US visualization score: C - Severe limitations    Right kidney: 8.7 cm. Limited visualization..    Inferior vena cava: 1.7 cm.    Gallbladder: Obscured    Common bile duct: Obscured    Pancreas: Obscured.    TIPS:  Portal venous end: 99 cm/s  Mid: 118 cm/s  Hepatic venous end: 115 cm/s    Splenic vein: 42 cm/s, antegrade  Main portal vein: 54 cm/s, antegrade  Right portal vein: 16 cm/s, RETROGRADE  Left portal vein: 8 cm/s, RETROGRADE    Left hepatic vein: 16 cm/s, antegrade  Middle hepatic vein: 45 cm/s, antegrade  Right hepatic vein: 21 cm/s, antegrade    Inferior vena cava: 212 cm/s, phasic    Hepatic artery: 70/15 cm/s  Right hepatic artery: 74/20 cm/s  Left hepatic artery: 28/9 cm/s      Impression    IMPRESSION:  1. Visualization limited due to patient body habitus and inability to  cooperate with the examination.    2. Patent TIPS. Right and left portal veins are appropriately  retrograde in flow towards the TIPS.    FINDINGS SUGGESTIVE OF TIPS MALFUNCTION:       A. Peak shunt velocity < 90 cm/s or > 190 cm/s       B. Change in peak shunt velocity:            i. Decrease > 40 cm/s            ii. Increase > 60 cm/s       C. Distal shunt velocity  < 90 cm/s or > or = 220 cm/s       D. Main portal vein velocity < or = 30 cm/s       E. Change in portal venous flow direction from retrograde to  antegrade         Jerry RY, Stephanie BELLO, Jodie WD, Greg KM, Roxana SA, Pilebonie  TK. Doppler sonography findings associated with transjugular  intrahepatic portosystemic shunt malfunction. AJR Am J Roentgenol.  1997 Feb;168(2):467-72. doi: 10.2214/[id number]. PMID: 8686030.    I have personally reviewed the examination and initial interpretation  and I agree with the findings.    JACIEL AU MD         SYSTEM ID:  [id number]   US Lower Extremity Venous Duplex Right     Value    Radiologist flags (Urgent)    Narrative    ULTRASOUND LOWER EXTREMITY VENOUS DUPLEX RIGHT 1/23/2023 3:17 PM    CLINICAL HISTORY: swelling, pain, r/o DVT.     COMPARISONS: None available.    REFERRING PROVIDER: JAMIE FRIED    TECHNIQUE: Grayscale, color Doppler, Doppler waveform ultrasound  evaluation was performed through the right common femoral, femoral,  and popliteal veins. Right posterior tibial and peroneal veins were  evaluated with grayscale imaging and compression.    Left common femoral vein was evaluated for symmetry.    FINDINGS: Left common femoral vein is patent, fully compressible, and  demonstrates normal phasic Doppler waveform.    Right common femoral vein is fully compressible with a phasic  waveform.    Right femoral vein is fully compressible with a phasic waveform in the  mid thigh.    Right femoral vein in the lower thigh is partially compressible with a  phasic waveform.    Right popliteal vein is occluded and non compressible    Right posterior tibial veins are fully compressible to the ankle.    Right peroneal veins are non compressible in the mid calf.      Impression    IMPRESSION:   1. Deep venous thrombosis in the right leg.        A. Non occlusive in the femoral vein in the lower thigh.       B. Occlusive in the popliteal vein.       C. Occlusive in the  "peroneal veins.    Reference: \"Duplex Ultrasound in the Diagnosis of Lower-Extremity Deep  Venous Thrombosis\"- Deena Garza MD, S; Ciro Maurice MD  (Circulation. 2014;129:917-921. http://circ.ahajournals.org)    [Access Center:   1. Deep venous thrombosis in the right leg.        A. Non occlusive in the femoral vein in the lower thigh.       B. Occlusive in the popliteal vein.       C. Occlusive in the peroneal veins.  ]    This report will be copied to the [facility] to ensure a  provider acknowledges the finding. Access Center is available Monday  through Friday 8am-3:30 pm.     JACIEL AU MD         SYSTEM ID:  [id number]   IR CVC Tunnel Placement > 5 Yrs of Age    Narrative    Procedure 1/26/2023:  Ultrasound guided left internal jugular vein access  Fluoroscopic guided placement of right chest tunneled hemodialysis  catheter    History: 67-year-old male with CKD requiring hemodialysis    Comparison: No pertinent comparison exams    Staff: Dr. Truong Avalos    Fellow: None    Monitoring/Medications: Patient received moderate sedation and was  monitored by the nursing staff under the supervision of the IR  attending. Vital signs remained stable throughout the procedure.    0.5 mg Versed  25 mcg Fentanyl  1% lidocaine used for local analgesia.    Continuous face-to-face sedation time: 30 minutes    Fluoro time: 1.2 minutes    Findings/procedure:    Prior to the procedure, both verbal and written informed consent  obtained from the patient. Patient placed supine. The left neck and  chest were prepped in usual sterile fashion. Preprocedure ultrasound  demonstrated a patent left internal jugular vein.    1% lidocaine for local anesthesia. Under ultrasound guidance a  micropuncture needle was advanced into the left internal jugular vein.  Image documenting the needle within the vein  was archived. Microwire  advanced through the needle and needle exchanged for micropuncture  sheath. " Appropriate length for the catheter was measured.  Micropuncture exchanged for a Amplatz wire which was advanced into the  IVC. An appropriate tunnel site along the left chest was selected and  anesthetized. A small incision was made and a 14.5 Greenlandic x 27 cm dual  lumen cuffed tunneled hemodialysis catheter was tunneled under skin to  the venotomy site. A peel-away sheath was advanced over the wire. The  catheter was then advanced through the peel-away sheath with its tip  terminating in the right atrium. Both lumens aspirated and flushed  appropriately. The lumens were locked with 2 cc of 1000 units per cc  of heparin. The venotomy site was closed using surgical glue. The  catheter was then dressed with sterile materials.      Impression    IMPRESSION:  Successful placement of 14.5 Greenlandic x 27 cm tip to cuff dual lumen  tunneled hemodialysis catheter.    SHREYAS MALAVE MD         SYSTEM ID:  [id number]       Discharge Medications   Current Discharge Medication List      CONTINUE these medications which have NOT CHANGED    Details   bumetanide (BUMEX) 1 MG tablet 2 mg 2 times daily      Calcium Carbonate-Vitamin D 500-3.125 MG-MCG TABS Take 1 tablet by mouth 2 times daily      diltiazem ER (DILT-XR) 120 MG 24 hr capsule Take 120 mg by mouth      folic acid (FOLVITE) 1 MG tablet TAKE FIVE TABLETS BY MOUTH EVERY DAY      lactulose (CHRONULAC) 10 GM/15ML solution Take 20 g by mouth 3 times daily      multivitamin w/minerals (THERA-VIT-M) tablet Take 1 tablet by mouth daily      omeprazole 20 MG tablet Take 40 mg by mouth 2 times daily      potassium chloride (KLOR-CON) 20 MEQ packet Take 20 mEq by mouth 2 times daily      predniSONE (DELTASONE) 5 MG tablet Take 10 mg by mouth      spironolactone (ALDACTONE) 50 MG tablet Take 25 mg by mouth 2 times daily      XIFAXAN 550 MG TABS tablet Take 550 mg by mouth 2 times daily      zinc sulfate (ZINCATE) 220 (50 Zn) MG capsule Take 220 mg by mouth daily      fidaxomicin  (DIFICID) 200 MG tablet Take 1 tablet (200 mg) by mouth 2 times daily  Qty: 20 tablet, Refills: 0    Associated Diagnoses: C. difficile colitis         STOP taking these medications       pantoprazole (PROTONIX) 40 MG EC tablet Comments:   Reason for Stopping:             Allergies   No Known Allergies

## 2023-01-28 NOTE — PLAN OF CARE
Patient condition improving. Alert and oriented x4, calm, pleasant, cooperative with cares. LS clear with no respiratory issues noted. Still on Heparin drip at 950 unit(s)/hr via PIV and infusing well. Was seen and rounded by primary as well as nephrology team. Hep 10A level resulted, heparin protocol initiated. Showered this morning and was sent to dialysis for HD run at 1430. Discharge orders placed. Patient to discharge to home after dialysis. Discontinue Heparin drip and administer Apixaban prior to discharge.    P: Discharge to home after dialysis.    Goal Outcome Evaluation:      Plan of Care Reviewed With: patient    Overall Patient Progress: improvingOverall Patient Progress: improving

## 2023-01-28 NOTE — PLAN OF CARE
"   /60 (BP Location: Left arm)   Pulse 83   Temp 97.8  F (36.6  C) (Oral)   Resp 20   Ht 1.727 m (5' 8\")   Wt 96.5 kg (212 lb 12.8 oz)   SpO2 98%   BMI 32.36 kg/m   Alert/ox3.  Given scheduled lactulose. Mildly anxious surrounding when today's dialysis will occur as wife traveling from [place] to attend.  Overwhelmed  with new process.  Right leg > swelling than left. . LCVC DL intact.  LUE PIV intact infusing heparin gtt @ 950 unit(s)/hr.  RUE PIV intact s/l. Scattered bruising and BLE edematous.  Tolerating regular diet.        Plan:  Continue to monitor for bleeding, electrolytes, and plan for dialysis today. Update provider with changes. Anticipate eventually going home with wife and initiating dialysis with [facility] in [place] for T/TH/S  AMs.    Goal Outcome Evaluation:  Plan of Care Reviewed With: patient  Overall Patient Progress: improving  Outcome Evaluation: Patient Xa therapeutic @ 0055 with value of 0.48. Await next lab .  New bag heparin hung.  Patient will be having dialysis today. Wife to come from [place].  Patient up in chair during evening. Melatonin for sleep hygeine. Denies pain.  "

## 2023-01-28 NOTE — PROGRESS NOTES
"    Nephrology Progress Note  01/28/2023     Assessment & Recommendations:   Mr. 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 Patient was admitted 1/23 with three days of progressive confusion concerning for decompensated cirrhosis with hepatic encephalopathy. Found to have leukocytosis, DVT of RLE, and progressive CKD.    ESRD on HD  - First HD 1/27 with low BFR/DFR. 2nd HD 1/28  - Continue outpatient HD at [facility]  - Continue PO diuretics    Recommendations were communicated to primary team verbally    Seen and discussed with Dr. Scottie Nieto MD   Division of Renal Disease and Hypertension  [facility]  [facility]  [employer] Web Console    Interval History :   Nursing and provider notes from last 24 hours reviewed.  In the last 24 hours Damion Quinones remained clinically stable. Tolerated HD #1 well yesterday. Denies any concerns and feels ready to go home. No SOB, pain. Swelling improving.    Physical Exam:   I/O last 3 completed shifts:  In: 360 [P.O.:360]  Out: 1000 [Other:1000]   /70 (BP Location: Left arm)   Pulse 78   Temp 97.8  F (36.6  C) (Oral)   Resp 20   Ht 1.727 m (5' 8\")   Wt 96.5 kg (212 lb 12.8 oz)   SpO2 100%   BMI 32.36 kg/m       GENERAL APPEARANCE: NAD  EYES:  no scleral icterus, pupils equal  PULM: breathing non-labored  CV: regular rhythm, normal rate     -edema ++   GI: soft, distended  INTEGUMENT: no rash on exposed surfaces  NEURO:  AOx3, normal speech  Access LIJ TDC    Labs:   All labs reviewed by me  Electrolytes/Renal - Recent Labs   Lab Test 01/28/23  0636 01/27/23  0604 01/26/23  0538   * 135* 138   POTASSIUM 4.3 4.4 4.4 "   CHLORIDE 99 102 103   CO2 19* 17* 16*   BUN 58.4* 85.2* 86.7*   CR 4.85* 6.32* 6.38*   GLC 84 101* 87   NAZARIO 8.7* 8.8 9.1   MAG 1.8 1.9 2.2   PHOS 5.6* 6.5* 6.2*       CBC -   Recent Labs   Lab Test 01/28/23  0636 01/27/23  0604 01/26/23  0538   WBC 13.3* 12.4* 13.8*   HGB 11.3* 11.1* 12.0*   * 106* 116*       LFTs -   Recent Labs   Lab Test 01/28/23  0636 01/27/23  0604 01/26/23  0538   ALKPHOS 193* 203* 210*   BILITOTAL 0.7 0.7 0.9   ALT 28 44 49   AST 43 56* 53*   PROTTOTAL 4.8* 4.8* 5.1*   ALBUMIN 2.7* 2.8* 3.0*       Iron Panel -   Recent Labs   Lab Test 11/22/22  0848   IRON 68   IRONSAT 31   HOLA 429*     Current Medications:    sodium chloride 0.9%  250 mL Intravenous Once in dialysis/CRRT     sodium chloride 0.9%  300 mL Hemodialysis Machine Once     bumetanide  2 mg Oral BID     diltiazem ER COATED BEADS  120 mg Oral Daily     folic acid  5 mg Oral Daily     sodium chloride (PF) 0.9%  1.3-2.6 mL Intracatheter Once    Followed by     heparin  3 mL Intracatheter Once     sodium chloride (PF) 0.9%  1.3-2.6 mL Intracatheter Once    Followed by     heparin  3 mL Intracatheter Once     lactulose  20 g Oral TID     multivitamin w/minerals  1 tablet Oral Daily     - MEDICATION INSTRUCTIONS -   Does not apply Once     pantoprazole  20 mg Oral Daily     [Held by provider] polyethylene glycol  17 g Oral TID     predniSONE  10 mg Oral Daily     rifaximin  550 mg Oral BID     sodium chloride (PF)  10-40 mL Intracatheter Q8H     sodium chloride (PF)  3 mL Intracatheter Q8H     sodium chloride (PF)  9 mL Intracatheter During Dialysis/CRRT (from stock)     sodium chloride (PF)  9 mL Intracatheter During Dialysis/CRRT (from stock)     spironolactone  25 mg Oral BID       heparin 1,100 Units/hr (01/28/23 1058)     All images reviewed    Que Nieto MD

## 2023-01-29 ENCOUNTER — HEALTH MAINTENANCE LETTER (OUTPATIENT)
Age: 68
End: 2023-01-29

## 2023-01-29 NOTE — PLAN OF CARE
Physical Therapy Discharge Summary    Reason for therapy discharge:    Discharged to home with outpatient therapy.    Progress towards therapy goal(s). See goals on Care Plan in [facility] electronic health record for goal details.  Goals partially met.  Barriers to achieving goals:   discharge from facility.    Therapy recommendation(s):    Continued therapy is recommended.  Rationale/Recommendations:  to improve safety and tolerance of IND mobility.

## 2023-01-29 NOTE — PLAN OF CARE
Occupational Therapy Discharge Summary    Reason for therapy discharge:    Discharged to home with outpatient therapy.    Progress towards therapy goal(s). See goals on Care Plan in [facility] electronic health record for goal details.  Goals partially met.  Barriers to achieving goals:   discharge from facility.    Therapy recommendation(s):    Continued therapy is recommended.  Rationale/Recommendations:  Pt would benefit from further therapy to reduce BLE edema impacting optimal functional mobility and ADL performance.

## 2023-01-29 NOTE — PROGRESS NOTES
HEMODIALYSIS TREATMENT NOTE    Date: 1/28/2023  Time: 6:20 PM    Data:  Pre Wt:   Unsure, no weight recorded today  Desired Wt:   kg   Post Wt:    Weight change:   kg  Ultrafiltration - Post Run Net Total Removed (mL): 1267 mL  Vascular Access Status: CVC, patent, locked with NS  Dialyzer Rinse: Clear  Total Blood Volume Processed: 47.36 L Liters  Total Dialysis (Treatment) Time: 3 Hours    Lab:       01/28/23 17:05   Heparin Unfractionated Anti Xa Level 0.78       Interventions/Assessment:  Patient arrived to unit without complaints of SOB, CP, GI upset, or Pain. Patient able to move himself independently in bed. Patient had some right leg cramping about MCFP through treatment that improved with decreasing goal and 50mL NS bolus. UF was shut off for a short time to help alleviate, as well. UF goal initially set for 2L, but decreased to 1.3L after cramping. CVC functioned optimally without issue. Report given to CHAYA Nicholson. Patient left unit alert and stable.      Plan:    Per renal.

## 2023-01-29 NOTE — PLAN OF CARE
Patient discharged from unit to home in stable condition, accompanied by wife at 1850. Heparin drip discontinued and apixaban administered thereafter, prescription sent to [facility] in [facility]. Discharge instructions given to wife including new medication and future MD appointments. Verbalized understanding of discharge order and had no questions. Personal belongings taken by patient.

## 2023-01-30 LAB
HBV SURFACE AB SERPL IA-ACNC: 0.12 M[IU]/ML
HBV SURFACE AB SERPL IA-ACNC: NONREACTIVE M[IU]/ML

## 2023-01-30 NOTE — TELEPHONE ENCOUNTER
DIAGNOSIS: ESRD (end stage renal disease) on dialysis    DATE RECEIVED: 02.20.2023   NOTES STATUS DETAILS   OFFICE NOTE from referring provider Internal 01.23.2023 Polo Esteban MD   OFFICE NOTE from other specialist      *Only VASCULITIS or LUPUS gather office notes for the following     *PULMONARY       *ENT     *DERMATOLOGY     *RHEUMATOLOGY     DISCHARGE SUMMARY from hospital Internal 01.15.2023  Health   DISCHARGE REPORT from the ER Internal 01.23.2023 Polo Esteban MD   MEDICATION LIST Internal    IMAGING  (NEED IMAGES AND REPORTS)     KIDNEY CT SCAN     KIDNEY ULTRASOUND     MR ABDOMEN     NUCLEAR MEDICINE RENAL     LABS     CBC Internal 01.28.2023   CMP Internal 01.28.2023   BMP Internal 01.24.2023   UA Internal 01.23.2023   URINE PROTEIN Internal 01.23.2023   RENAL PANEL     BIOPSY     KIDNEY BIOPSY            No

## 2023-02-01 ENCOUNTER — OFFICE VISIT (OUTPATIENT)
Dept: FAMILY MEDICINE | Facility: CLINIC | Age: 68
End: 2023-02-01
Payer: COMMERCIAL

## 2023-02-01 ENCOUNTER — PATIENT OUTREACH (OUTPATIENT)
Dept: NEPHROLOGY | Facility: CLINIC | Age: 68
End: 2023-02-01

## 2023-02-01 VITALS
HEART RATE: 80 BPM | DIASTOLIC BLOOD PRESSURE: 76 MMHG | OXYGEN SATURATION: 96 % | WEIGHT: 213 LBS | TEMPERATURE: 97.8 F | BODY MASS INDEX: 32.39 KG/M2 | SYSTOLIC BLOOD PRESSURE: 108 MMHG

## 2023-02-01 DIAGNOSIS — N18.6 ESRD (END STAGE RENAL DISEASE) ON DIALYSIS (H): Primary | ICD-10-CM

## 2023-02-01 DIAGNOSIS — E83.110 HEREDITARY HEMOCHROMATOSIS (H): ICD-10-CM

## 2023-02-01 DIAGNOSIS — E11.9 TYPE 2 DIABETES MELLITUS WITHOUT COMPLICATION, UNSPECIFIED WHETHER LONG TERM INSULIN USE (H): ICD-10-CM

## 2023-02-01 DIAGNOSIS — I85.11 ESOPHAGEAL VARICES WITH BLEEDING IN DISEASES CLASSIFIED ELSEWHERE (H): ICD-10-CM

## 2023-02-01 DIAGNOSIS — I48.20 CHRONIC ATRIAL FIBRILLATION (H): ICD-10-CM

## 2023-02-01 DIAGNOSIS — Z99.2 ESRD (END STAGE RENAL DISEASE) ON DIALYSIS (H): Primary | ICD-10-CM

## 2023-02-01 DIAGNOSIS — L40.50 PSORIATIC ARTHRITIS (H): ICD-10-CM

## 2023-02-01 DIAGNOSIS — K70.31 ALCOHOLIC CIRRHOSIS OF LIVER WITH ASCITES (H): ICD-10-CM

## 2023-02-01 PROBLEM — N08 GLOMERULONEPHRITIS DUE TO ANTINEUTROPHIL CYTOPLASMIC ANTIBODY (ANCA) POSITIVE VASCULITIS (H): Status: ACTIVE | Noted: 2022-09-20

## 2023-02-01 PROBLEM — I77.82 GLOMERULONEPHRITIS DUE TO ANTINEUTROPHIL CYTOPLASMIC ANTIBODY (ANCA) POSITIVE VASCULITIS (H): Status: ACTIVE | Noted: 2022-09-20

## 2023-02-01 PROBLEM — I85.10 ESOPHAGEAL VARICES IN CIRRHOSIS (H): Status: ACTIVE | Noted: 2022-09-24

## 2023-02-01 PROBLEM — N18.9 ACUTE KIDNEY INJURY SUPERIMPOSED ON CHRONIC KIDNEY DISEASE (H): Status: RESOLVED | Noted: 2022-12-16 | Resolved: 2023-02-01

## 2023-02-01 PROBLEM — M1A.9XX1 TOPHACEOUS GOUT: Status: ACTIVE | Noted: 2022-12-06

## 2023-02-01 PROBLEM — N17.9 ACUTE KIDNEY INJURY SUPERIMPOSED ON CHRONIC KIDNEY DISEASE (H): Status: RESOLVED | Noted: 2022-12-16 | Resolved: 2023-02-01

## 2023-02-01 PROBLEM — I10 ESSENTIAL HYPERTENSION: Status: ACTIVE | Noted: 2017-12-26

## 2023-02-01 PROBLEM — K76.82 HEPATIC ENCEPHALOPATHY (H): Status: RESOLVED | Noted: 2023-01-23 | Resolved: 2023-02-01

## 2023-02-01 PROBLEM — K74.60 ESOPHAGEAL VARICES IN CIRRHOSIS (H): Status: ACTIVE | Noted: 2022-09-24

## 2023-02-01 PROBLEM — K31.819 GAVE (GASTRIC ANTRAL VASCULAR ECTASIA): Status: ACTIVE | Noted: 2022-12-30

## 2023-02-01 PROBLEM — Z95.828 S/P TIPS (TRANSJUGULAR INTRAHEPATIC PORTOSYSTEMIC SHUNT): Status: ACTIVE | Noted: 2022-10-01

## 2023-02-01 PROBLEM — E66.01 MORBID OBESITY (H): Status: RESOLVED | Noted: 2022-12-07 | Resolved: 2023-02-01

## 2023-02-01 PROCEDURE — 99204 OFFICE O/P NEW MOD 45 MIN: CPT | Performed by: FAMILY MEDICINE

## 2023-02-01 NOTE — PROGRESS NOTES
Patient is scheduled with Dr. Malik for initial visit. Per pre visit assessment patient is a dialysis patient. Gen nephrology does not see current dialysis patients. Call to confirm reason for visit. Transplant? Onco-neph, second opinion on ESRD diagnosis.         Called, no answer, left voice message.     Received a call back from Casey Guerin's Wife.  Per wife patient was establish care with Nephrologist post hospitalization. Someone from scheduling called and scheduled the appointment. Due to patient Dialysis status, he does not need to be seen by a [facility] nephrologist, the attending nephrologist at the [facility] unit will be his primary nephrologist.Dr. malik appointment was canceled and patient will meet with Dialysis nephrologist. Started Dialysis inpatient, 1st outpatient treatment was 1/31/23. Dialysis history updated.       Koki Baca RN, BSN   Nephrology RN Care Coordinator   [facility]

## 2023-02-01 NOTE — PROGRESS NOTES
"  Assessment & Plan     ESRD (end stage renal disease) on dialysis (H)  Patient established with dialysis at [facility]. No follow up labs due at this time. Currenty holding potassium. Appropriate for home care for close monitoring given recurrent hospitalizations and recurrent episodes of confusion and bleeding    Alcoholic cirrhosis of liver with ascites (H)  Patient with end stage liver disease with recurrent episodes of hepatic encephalopathy, currently pursuing liver transplant. On lactulose and stooling regularly. Discussed important of eating regularly.    Psoriatic arthritis (H)  Patient currently weaning down on steroids, next week decrease to 2.5mg. Noting some increased swelling particularly to hands. Given risk of steroid use, continue weaning.    Chronic atrial fibrillation (H)  Patient rate controlled, on diltiazem. Patient denies that any medication refills are needed. Also on apixaban for a fib and DVT    Hereditary hemochromatosis (H)  Chronic condition, has not needed ongoing phlebotomy. Consider lab follow up as next visit    Esophageal varices with bleeding in diseases classified elsewhere (H)  No ongoing bleeding at this time, discussed increased risk of bleeding with anticoagulation, patient will monitor         55 minutes spent on the date of the encounter doing chart review, history and exam, documentation and further activities per the note     MED REC REQUIRED  Post Medication Reconciliation Status: discharge medications reconciled and changed, per note/orders  BMI:   Estimated body mass index is 32.39 kg/m  as calculated from the following:    Height as of 1/24/23: 1.727 m (5' 8\").    Weight as of this encounter: 96.6 kg (213 lb).   Not addressed given chronic disease        No follow-ups on file.    Gary Serrano MD  [facility]    ASSESSMENT:   Casey is a 66 y/o gentleman with a PMH of hemochromatosis, ESRD requiring dialysis, CAD, hypertension, " rheumatoid and psoriatic arthritis presenting to establish care and determine prednisone taper. He presents with his spouse, Apoorva. His recent medical history is extremely complex and characterized by recent onset liver failure and ESRD requiring dialysis. He is currently coordinating with transplant hepatology and nephrology for considering for joint liver/kidney transplant.     PLAN:  Prednisone taper:  Start 2.5 mg/day next week as previously instructed in current prednisone taper. If gout flare occurs contact clinic immediately.    Belching/Flatus:  Pharmaceutical intervention not recommended. Patient was instead educated on a low-FODMAP diet as a first step.    Liver Failure:  Patient care deferred to hepatology.    ESRD/Dialysis:  Patient care deferred to nephrology.    Subjective   Damion is a 67 year old accompanied by his spouse Apoorva, presenting for the following health issues:  Hospital F/U      Here to establish care. Diagnosed with hemochromatosis in 2016 with mild kidney and liver disease. Also long history of rhematoid and psoriatic arthritis, on Humira for many years. Had occasional phlebotomies. In September, mass was found on kidney. Not cancer but showed worsening function with creatinine increasing to 4 and GFR down to 12. Started on predisone and Humira discontinued. Started with hematemesis. Has been in and out of hospital since that time. Gets episodes of hepatic encephelopathy. Has also had clostridium difficle.     Spouse, Apoorva, expressed frustration and confusion regarding Casey's medical course and the liver/kidney transplant process. She reviewed the bleak outlook given during a joint meeting with nephrology and hepatology. She asked for clarification how he could have been active and independent in September 2022 and in/out of the hospital shortly afterwards for hematemesis, hepatic encephalopathy, and dialysis.    He reports that he is currently take 5 mg/day of prednisone and is actively  tapering. Next week he will be taking 2.5 mg/day. However, he is concerned that his gout will flare when he is no longer taking prednisone. Casey has also been experiencing a significant amount of flatus and belching. Gas-X was attempted and shortly afterwards Casey was hospitalized for recurrent hematemesis - unclear if Gas-X related to hospitalization.     ED/UC Followup:    Facility:  [facility]  Date of visit: 1/23-1/28  Reason for visit: ESRD, confusion, DVT  Current Status: On Eliquis, advised to stop potassium by nephrology today. Needs home care (accepted to be seen by [facility] phone [phone]). First round of dialysis done today at [facility].         Review of Systems   Constitutional, HEENT, cardiovascular, pulmonary, gi and gu systems are negative, except as otherwise noted.      Objective    /76 (BP Location: Right arm, Patient Position: Sitting, Cuff Size: Adult Large)   Pulse 80   Temp 97.8  F (36.6  C) (Tympanic)   Wt 96.6 kg (213 lb)   SpO2 96%   BMI 32.39 kg/m    Body mass index is 32.39 kg/m .  Physical Exam   GENERAL: Appears fatigued but otherwise in no acute distress  RESP: lungs clear to auscultation - no rales, rhonchi or wheezes  CV: regular rate and rhythm, normal S1 S2, no S3 or S4, no murmur, click or rub. Right lower leg swollen relative to left - chronic from recent DVT

## 2023-02-02 ENCOUNTER — TELEPHONE (OUTPATIENT)
Dept: TRANSPLANT | Facility: CLINIC | Age: 68
End: 2023-02-02
Payer: COMMERCIAL

## 2023-02-02 NOTE — TELEPHONE ENCOUNTER
Spoke with Casey and spouse Apoorva    DIscharged from [facility] recent admission, now on dialysis M/W/F, getting outpatient at [facility]    Had cardiac MRI requested by team Monday, but was unable to perform test as current TIPS interfered with imaging quality    Family wants to know next steps on deciding transplant candidacy.    Working with at home PT    Has sleep study scheduled for Monday    Message to Dr. Poole and Dr. Bennett on next steps given patient has lexiscan, echo, unable to do cardiac MRI.      Re-discuss candidacy as soon as able.

## 2023-02-06 ENCOUNTER — OFFICE VISIT (OUTPATIENT)
Dept: SLEEP MEDICINE | Facility: CLINIC | Age: 68
End: 2023-02-06
Payer: COMMERCIAL

## 2023-02-06 VITALS
DIASTOLIC BLOOD PRESSURE: 72 MMHG | HEART RATE: 78 BPM | WEIGHT: 209.06 LBS | BODY MASS INDEX: 33.6 KG/M2 | HEIGHT: 66 IN | SYSTOLIC BLOOD PRESSURE: 104 MMHG | OXYGEN SATURATION: 99 %

## 2023-02-06 DIAGNOSIS — G47.30 SLEEP-RELATED BREATHING DISORDER: Primary | ICD-10-CM

## 2023-02-06 PROCEDURE — 99205 OFFICE O/P NEW HI 60 MIN: CPT | Performed by: NURSE PRACTITIONER

## 2023-02-06 RX ORDER — ZOLPIDEM TARTRATE 10 MG/1
10 TABLET ORAL
Qty: 1 TABLET | Refills: 0 | Status: SHIPPED | OUTPATIENT
Start: 2023-02-06 | End: 2023-02-17

## 2023-02-06 ASSESSMENT — SLEEP AND FATIGUE QUESTIONNAIRES
HOW LIKELY ARE YOU TO NOD OFF OR FALL ASLEEP WHILE LYING DOWN TO REST IN THE AFTERNOON WHEN CIRCUMSTANCES PERMIT: HIGH CHANCE OF DOZING
HOW LIKELY ARE YOU TO NOD OFF OR FALL ASLEEP IN A CAR, WHILE STOPPED FOR A FEW MINUTES IN TRAFFIC: WOULD NEVER DOZE
HOW LIKELY ARE YOU TO NOD OFF OR FALL ASLEEP WHILE SITTING AND TALKING TO SOMEONE: WOULD NEVER DOZE
HOW LIKELY ARE YOU TO NOD OFF OR FALL ASLEEP WHILE SITTING QUIETLY AFTER LUNCH WITHOUT ALCOHOL: SLIGHT CHANCE OF DOZING
HOW LIKELY ARE YOU TO NOD OFF OR FALL ASLEEP WHILE WATCHING TV: SLIGHT CHANCE OF DOZING
HOW LIKELY ARE YOU TO NOD OFF OR FALL ASLEEP WHILE SITTING INACTIVE IN A PUBLIC PLACE: WOULD NEVER DOZE
HOW LIKELY ARE YOU TO NOD OFF OR FALL ASLEEP WHILE SITTING AND READING: SLIGHT CHANCE OF DOZING
HOW LIKELY ARE YOU TO NOD OFF OR FALL ASLEEP WHEN YOU ARE A PASSENGER IN A CAR FOR AN HOUR WITHOUT A BREAK: SLIGHT CHANCE OF DOZING

## 2023-02-06 NOTE — NURSING NOTE
Sleep study and return visit has been scheduled. AVS and sleep study packet/letter given to patient.     Ayaz Clancy [facility]

## 2023-02-06 NOTE — NURSING NOTE
"Chief Complaint   Patient presents with     Consult     For sleep study        Initial /72   Pulse 78   Ht 1.682 m (5' 6.22\")   Wt 94.8 kg (209 lb 1 oz)   SpO2 99%   BMI 33.52 kg/m   Estimated body mass index is 33.52 kg/m  as calculated from the following:    Height as of this encounter: 1.682 m (5' 6.22\").    Weight as of this encounter: 94.8 kg (209 lb 1 oz).    Medication Reconciliation: complete    Neck circumference: 49 centimeters.    DME: n/a    JUNIE Zuniga  [facility]      "

## 2023-02-06 NOTE — PROGRESS NOTES
Outpatient Sleep Medicine Consultation:      Name: Damion Quinones MRN# [id number]   Age: 67 year old YOB: 1955     Date of Consultation: February 6, 2023  Consultation is requested by: No referring provider defined for this encounter. No ref. provider found  Primary care provider: Gary Serrano       Reason for Sleep Consult:     Damion Quinones is sent by No ref. provider found for a sleep consultation regarding obtaining a sleep study as a necessary part of being worked up for a liver and kidney transplant..    Patient s Reason for visit  Damion Quinones main reason for visit: transplant ask for ir  Patient states problem(s) started: no problem  Damion Quinones's goals for this visit: get on transplant list           Assessment and Plan:     Summary Sleep Diagnoses:  Multiple Nocturnal Awakenings  Alcohol Misuse  Paroxysmal atrial fibrillation  Hypertension    Comorbid Diagnoses:  Hereditary hemochromatosis  Esophageal varices and cirrhosis  End-stage liver disease  Hyperlipidemia  Gout  Alcoholic Cirrhosis of the Liver with Ascites  End-Stage Liver Disease  Esophageal Varices and Cirrhosis       Summary Recommendations:  Orders Placed This Encounter   Procedures     Comprehensive Sleep Study     1.  Recommend patient undergo a comprehensive in laboratory polysomnographic study.  Patient has a history of atrial fibrillation, as well is hypertension and extensive liver disease.  His STOP-BANG score is 4/8 indicating an increased pretest probability for obstructive sleep apnea.  Due to his medical history, patient should have an in laboratory PSG versus an HST.  2.  Patient should return to clinic approximately 2 weeks after PSG completed.  This will be to review the results as well as to determine the next steps in his plan of care.  3.  Patient should optimize his sleep hygiene practices to minimize any opportunities for sleep disturbance.  4.  Risks of drowsy driving were explained to  patient    Summary Counselin.  Pathophysiology of both central sleep apnea as well as obstructive sleep apnea were reviewed with the patient.    2.  Process of obtaining an in laboratory polysomnogram were discussed with patient and his wife.  3.  Potential sequelae of untreated sleep apnea were discussed in the context of his medical diagnoses  4.  All treatment options, including PAP therapy, mandibular advancement device, and surgical options were discussed with patient should he be diagnosed with obstructive sleep apnea.    Medical Decision-making:   Educational materials provided in his after visit instructions    Total time spent reviewing medical records, history and physical examination, review of previous testing and interpretation as well as documentation on this date: 60 minutes    CC: No ref. provider found          History of Present Illness:     Damion uQinones is a 67-year-old male patient who presents today to the sleep medicine clinic stating as part of his work-up for his potential transplant which has yet been approved by the committee, he needs to have a sleep study completed.  He is here today with his wife requesting a sleep study.  Damion has a past medical history relevant for alcohol misuse, alcoholic cirrhosis of the liver with ascites, end-stage liver disease, esophageal varices and cirrhosis, gastric antral vascular ectasia, hereditary hemochromatosis, hyperlipidemia, gout, paroxysmal atrial fibrillation, glomerulonephritis, hypertension, psoriatic arthritis, psoriasis, end-stage renal disease currently receiving dialysis.    Patient denies any symptoms of sleep disordered breathing.  He states he does not snore, rarely feels fatigued has had no episodes witnessed of apnea, does not experience insomnia nor excessive daytime sleepiness.  He is not unhappy with his sleep.  He does note however frequent nocturnal awakenings for elimination purposes.  He prefers the lateral position  with his head elevated to sleep.  Patient states he goes to bed at 8:00 PM, rising at 7 AM.  He describes a sleep latency of 10 minutes.  He states he obtain 8 hours of sleep and minutes satisfactory to him.  He feels his sleep is restorative.  Patient admits to intentionally napping 5 times per week, 30 minutes in length.  He states he feels better after his naps.  His [place] Sleepiness Scale scores 7/24 indicating mild sleepiness.  He scored one-point on his insomnia severity index.    We will proceed with the PSG, and patient will return to clinic either face-to-face, or virtually per his request and we will discuss his results and determine his plan of care following that.    Past Sleep Evaluations: None    SLEEP-WAKE SCHEDULE:     Work/School Days: Patient goes to school/work: No   Usually gets into bed at 8  Takes patient about 10 min to fall asleep  Has trouble falling asleep 0 nights per week  Wakes up in the middle of the night 3x times.  Wakes up due to Use the bathroom  He has trouble falling back asleep 1x times a week.   It usually takes 10 min to get back to sleep  Patient is usually up at 7am  Uses alarm: No    Weekends/Non-work Days/All Other Days:  Usually gets into bed at 8   Takes patient about 10min to fall asleep  Patient is usually up at 7am  Uses alarm: No    Sleep Need  Patient gets  8hr sleep on average   Patient thinks he needs about 8hrs sleep    Damion Quinones prefers to sleep in this position(s): Side;Head Elevated   Patient states they do the following activities in bed:      Naps  Patient takes a purposeful nap 5x times a week and naps are usually 30min in duration  He feels better after a nap: Yes  He dozes off unintentionally 0 days per week  Patient has had a driving accident or near-miss due to sleepiness/drowsiness: No      SLEEP DISRUPTIONS:    Breathing/Snoring  Patient snores:No  Other people complain about his snoring: No  Patient has been told he stops breathing in his  sleep:No  He has issues with the following: Getting up to urinate more than once    Movement:  Patient gets pain, discomfort, with an urge to move:  No  It happens when he is resting:  No  It happens more at night:  No  Patient has been told he kicks his legs at night:  No     Behaviors in Sleep:  Damion Quinones has experienced the following behaviors while sleeping:    He has experienced sudden muscle weakness during the day: No      Is there anything else you would like your sleep provider to know:      CAFFEINE AND OTHER SUBSTANCES:    Patient consumes caffeinated beverages per day:  0  Last caffeine use is usually:    List of any prescribed or over the counter stimulants that patient takes:    List of any prescribed or over the counter sleep medication patient takes:    List of previous sleep medications that patient has tried:    Patient drinks alcohol to help them sleep: No  Patient drinks alcohol near bedtime: No    Family History:  Patient has a family member been diagnosed with a sleep disorder: No            SCALES:    EPWORTH SLEEPINESS SCALE      Ethel Sleepiness Scale ( KIRAN Peck  [id number]<br>ESS - [place]/English - Final version - 21 Nov 07 - [facility] [place].) 2/6/2023   Sitting and reading Slight chance of dozing   Watching TV Slight chance of dozing   Sitting, inactive in a public place (e.g. a theatre or a meeting) Would never doze   As a passenger in a car for an hour without a break Slight chance of dozing   Lying down to rest in the afternoon when circumstances permit High chance of dozing   Sitting and talking to someone Would never doze   Sitting quietly after a lunch without alcohol Slight chance of dozing   In a car, while stopped for a few minutes in traffic Would never doze   Ethel Score (MC) 7   Ethel Score (Sleep) 7         INSOMNIA SEVERITY INDEX (SHERRI)      Insomnia Severity Index (SHERRI) 2/6/2023   Difficulty falling asleep 0   Difficulty staying asleep 0   Problems waking up  "too early 0   How SATISFIED/DISSATISFIED are you with your CURRENT sleep pattern? 1   How NOTICEABLE to others do you think your sleep problem is in terms of impairing the quality of your life? 0   How WORRIED/DISTRESSED are you about your current sleep problem? 0   To what extent do you consider your sleep problem to INTERFERE with your daily functioning (e.g. daytime fatigue, mood, ability to function at work/daily chores, concentration, memory, mood, etc.) CURRENTLY? 0   SHERRI Total Score 1       Guidelines for Scoring/Interpretation:  Total score categories:  0-7 = No clinically significant insomnia   8-14 = Subthreshold insomnia   15-21 = Clinical insomnia (moderate severity)  22-28 = Clinical insomnia (severe)  Used via courtesy of www.[employer].th.va.gov with permission from Primitivo Sims PhD., [facility]      STOP BANG     STOP BANG Questionnaire (  2008, the American Society of Anesthesiologists, Inc. [employer] 2/6/2023   1. Snoring - Do you snore loudly (louder than talking or loud enough to be heard through closed doors)? No   2. Tired - Do you often feel tired, fatigued, or sleepy during daytime? Yes   3. Observed - Has anyone observed you stop breathing during your sleep? No   4. Blood pressure - Do you have or are you being treated for high blood pressure? No   5. BMI - BMI more than 35 kg/m2? No   6. Age - Age over 50 yr old? Yes   7. Neck circumference - Neck circumference greater than 40 cm? Yes   8. Gender - Gender male? Yes   STOP BANG Score (MC): 3 (High risk of NIKOS)   Neck Cir (cm) Clinic: 49   B/P Clinic: 104/72   BMI Clinic: 33.52         GAD7    No flowsheet data found.      CAGE-AID    No flowsheet data found.    CAGE-AID reprinted with permission from the [place] Medical Journal, BIMAL Gomez. and BUTCH Monet, \"Conjoint screening questionnaires for alcohol and drug abuse\" [place] Medical Journal 94: 135-140, 1995.      PATIENT HEALTH QUESTIONNAIRE-9 (PHQ - " 9)    No flowsheet data found.    Developed by Sandra Moscoso, Felisha Nicholson, Lalit Tran and colleagues, with an educational jerardo from [employer] No permission required to reproduce, translate, display or distribute.        Allergies:    No Known Allergies    Medications:    Current Outpatient Medications   Medication Sig Dispense Refill     apixaban ANTICOAGULANT (ELIQUIS) 5 MG tablet Take 1 tablet (5 mg) by mouth 2 times daily 120 tablet 0     bumetanide (BUMEX) 1 MG tablet 2 mg 2 times daily       Calcium Carbonate-Vitamin D 500-3.125 MG-MCG TABS Take 1 tablet by mouth 2 times daily       diltiazem ER (DILT-XR) 120 MG 24 hr capsule Take 120 mg by mouth       folic acid (FOLVITE) 1 MG tablet TAKE FIVE TABLETS BY MOUTH EVERY DAY       lactulose (CHRONULAC) 10 GM/15ML solution Take 20 g by mouth 3 times daily       multivitamin w/minerals (THERA-VIT-M) tablet Take 1 tablet by mouth daily       omeprazole 20 MG tablet Take 40 mg by mouth 2 times daily       predniSONE (DELTASONE) 5 MG tablet Take 5 mg by mouth       XIFAXAN 550 MG TABS tablet Take 550 mg by mouth 2 times daily       zinc sulfate (ZINCATE) 220 (50 Zn) MG capsule Take 220 mg by mouth daily       spironolactone (ALDACTONE) 50 MG tablet Take 25 mg by mouth 2 times daily (Patient not taking: Reported on 2/6/2023)         Problem List:  Patient Active Problem List    Diagnosis Date Noted     ESRD (end stage renal disease) on dialysis (H) 01/27/2023     Priority: Medium     End-stage liver disease (H) 01/15/2023     Priority: Medium     Leukocytosis, unspecified type 01/15/2023     Priority: Medium     GAVE (gastric antral vascular ectasia) 12/30/2022     Priority: Medium     Psoriatic arthritis (H) 12/07/2022     Priority: Medium     PAF (paroxysmal atrial fibrillation) (H) 12/07/2022     Priority: Medium     Tophaceous gout 12/06/2022     Priority: Medium     S/P TIPS (transjugular intrahepatic portosystemic shunt) 10/01/2022      Priority: Medium     Esophageal varices in cirrhosis (H) 09/24/2022     Priority: Medium     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 09/20/2022     Priority: Medium     Alcoholic cirrhosis of liver with ascites (H) 10/11/2019     Priority: Medium     Hereditary hemochromatosis (H) 07/17/2019     Priority: Medium     Essential hypertension 12/26/2017     Priority: Medium     Family history of colon cancer 07/30/2014     Priority: Medium     Family history of prostate cancer 07/30/2014     Priority: Medium     Other hyperlipidemia 07/30/2014     Priority: Medium     Alcoholism (H) 06/11/2014     Priority: Medium     Psoriasis 05/29/2014     Priority: Medium        Past Medical/Surgical History:  Past Medical History:   Diagnosis Date     Alcoholic cirrhosis of liver with ascites (H) 10/11/2019     Arthritis     RA     Chronic kidney disease      Coronary artery disease     AFib 2016     Diabetes mellitus type 2, uncomplicated (H) 10/11/2019     History of blood transfusion     2016     History of hemochromatosis 10/11/2019     Hypertension      Hypokalemia      Obesity      PAF (paroxysmal atrial fibrillation) (H)      Psoriatic arthritis (H)      Past Surgical History:   Procedure Laterality Date     APPENDECTOMY      Removed at 18 Years Old      COLONOSCOPY      2014 at [facility]      EYE SURGERY Bilateral     Cataract     HERNIA REPAIR      History of bilateral inguinal hernia repair: 10/28/2014. Open hernia repair: 10/2017. Abdominal wound exploration and debridement 12/27/2017     INSERT SHUNT PORTAL TRANSJUGULAR INTRAHEPTIC  09/26/2022     IR CVC TUNNEL PLACEMENT > 5 YRS OF AGE  01/26/2023       Social History:  Social History     Socioeconomic History     Marital status:      Spouse name: Not on file     Number of children: Not on file     Years of education: Not on file     Highest education level: Not on file   Occupational History     Not on file  "  Tobacco Use     Smoking status: Never     Smokeless tobacco: Never   Substance and Sexual Activity     Alcohol use: Not Currently     Comment: Last ETOH use was 12/31/2021     Drug use: Not Currently     Sexual activity: Not on file   Other Topics Concern     Parent/sibling w/ CABG, MI or angioplasty before 65F 55M? Not Asked   Social History Narrative     Not on file     Social Determinants of Health     Financial Resource Strain: Not on file   Food Insecurity: Not on file   Transportation Needs: Not on file   Physical Activity: Not on file   Stress: Not on file   Social Connections: Not on file   Intimate Partner Violence: Not on file   Housing Stability: Not on file       Family History:  Family History   Problem Relation Age of Onset     Lung Cancer Mother      Colon Cancer Father      Lung Cancer Brother      Heart Failure Brother      Kidney Disease Brother        Review of Systems:  A complete review of systems reviewed by me is negative with the exeption of what has been mentioned in the history of present illness.  In the last TWO WEEKS have you experienced any of the following symptoms?  Fevers: No  Night Sweats: No  Weight Gain: No  Pain at Night: No  Double Vision: No  Changes in Vision: No  Difficulty Breathing through Nose: No  Sore Throat in Morning: No  Dry Mouth in the Morning: No  Shortness of Breath Lying Flat: No  Shortness of Breath With Activity: No  Awakening with Shortness of Breath: No  Increased Cough: No  Heart Racing at Night: No  Swelling in Feet or Legs: Yes  Diarrhea at Night: No  Heartburn at Night: No  Urinating More than Once at Night: Yes  Losing Control of Urine at Night: No  Joint Pains at Night: No  Headaches in Morning: No  Weakness in Arms or Legs: No  Depressed Mood: No     Physical Examination:  Vitals: /72   Pulse 78   Ht 1.682 m (5' 6.22\")   Wt 94.8 kg (209 lb 1 oz)   SpO2 99%   BMI 33.52 kg/m    BMI= Body mass index is 33.52 kg/m .    Neck Cir (cm): 49 " cm    Physical Exam  Vitals and nursing note reviewed.   Constitutional:       General: He is not in acute distress.     Appearance: Normal appearance. He is obese. He is chronically ill-appearing.   HENT:      Head: Normocephalic and atraumatic.      Right Ear: External ear normal.      Left Ear: External ear normal.      Nose: Nose normal.      Mouth/Throat:      Mouth: Mucous membranes are moist.      Pharynx: Oropharynx is clear.   Eyes:      Conjunctiva/sclera: Conjunctivae normal.   Cardiovascular:      Rate and Rhythm: Normal rate. Rhythm irregular.      Pulses: Normal pulses.      Heart sounds: Normal heart sounds.   Pulmonary:      Effort: Pulmonary effort is normal.      Breath sounds: Normal breath sounds. No wheezing or rales.   Chest:      Chest wall: No tenderness.   Musculoskeletal:         General: Normal range of motion.   Skin:     General: Skin is warm and dry.      Capillary Refill: Capillary refill takes less than 2 seconds.   Neurological:      General: No focal deficit present.      Mental Status: He is alert and oriented to person, place, and time.   Psychiatric:         Mood and Affect: Mood normal.         Behavior: Behavior normal.         Thought Content: Thought content normal.         Judgment: Judgment normal.     Physical Exam   Nursing note and vitals reviewed.  Constitutional: No distress. He appears chronically ill.   HENT:   Head: Normocephalic and atraumatic.   Right Ear: External ear normal.   Left Ear: External ear normal.   Nose: Nose normal.   Mouth/Throat: Mucous membranes are moist. Oropharynx is clear.   Eyes: Conjunctivae are normal.   Cardiovascular: Normal rate, normal heart sounds and normal pulses. An irregular rhythm present.   Pulmonary/Chest: Effort normal and breath sounds normal. He has no wheezes. He has no rales. He exhibits no tenderness.   Abdominal: Normal appearance.   Musculoskeletal:         General: Normal range of motion.   Neurological: He is alert and  oriented to person, place, and time.   Skin: Skin is warm and dry. Capillary refill takes less than 2 seconds.   Psychiatric: His behavior is normal. Mood, judgment and thought content normal.     Mallampati Class: III.  Tonsillar Stage: 1  hidden by pillars.         Data: All pertinent previous laboratory data reviewed     Recent Labs   Lab Test 23  0636 23  0604   * 135*   POTASSIUM 4.3 4.4   CHLORIDE 99 102   CO2 19* 17*   ANIONGAP 15 16*   GLC 84 101*   BUN 58.4* 85.2*   CR 4.85* 6.32*   NAZARIO 8.7* 8.8       Recent Labs   Lab Test 23  0636   WBC 13.3*   RBC 3.93*   HGB 11.3*   HCT 35.9*   MCV 91   MCH 28.8   MCHC 31.5   RDW 14.9   *       Recent Labs   Lab Test 23  0636   PROTTOTAL 4.8*   ALBUMIN 2.7*   BILITOTAL 0.7   ALKPHOS 193*   AST 43   ALT 28       TSH (uIU/mL)   Date Value   2022 2.86   2022 4.91 (H)       No results found for: UAMP, UBARB, BENZODIAZEUR, UCANN, UCOC, OPIT, UPCP    Iron Sat Index   Date/Time Value Ref Range Status   2022 08:48 AM 31 15 - 46 % Final     Ferritin   Date/Time Value Ref Range Status   2022 08:48  (H) 31 - 409 ng/mL Final       pH Venous POCT (no units)   Date Value   01/15/2023 7.41       @LABRCNTIPR(phv:4,pco2v:4,po2v:4,hco3v:4,sav:4,o2per:4)@    Echocardiology:   [facility],[place]  Echocardiography Laboratory  [address] [place] [id number]     Name: EMILIANA SCHREIBER  MRN: [id number]  : 1955  Study Date: 2022 10:25 AM  Age: 67 yrs  Gender: Male  Patient Location: [facility]  Reason For Study: Transplant - pre Liver  Ordering Physician: ANIBAL OTERO  Performed By: Marisela Bro RDCS     BSA: 2.1 m2  Height: 68 in  Weight: 220 lb  BP: 128/75 mmHg  ______________________________________________________________________________  Procedure  Echocardiogram with two-dimensional, color and spectral Doppler performed.  Contrast Optison. Optison (NDC #4925-6916-68) given  intravenously. Patient was  given 5 ml mixture of 3 ml Optison and 6 ml saline. 4 ml wasted. The patient's  rhythm is atrial fibrillation.  ______________________________________________________________________________  Interpretation Summary  The patient's rhythm is atrial fibrillation.  Global and regional left ventricular function is normal with an EF of 55-60%.  Global right ventricular function is normal.  IVC diameter and respiratory changes fall into an intermediate range  suggesting an RA pressure of 8 mmHg.  No pericardial effusion is present.  There has been no change.  ______________________________________________________________________________      Chest x-ray: X-ray Chest 2 vws [[id number]] 11/22/2022    Narrative  Exam: XR CHEST 2 VIEWS, 11/22/2022 2:20 PM    Indication: Alcoholic cirrhosis (H); Cirrhosis, alcoholic (H)    Comparison: None    Findings:  PA and lateral views of the chest. Incidentally visualized vascular  coiling in the left upper quadrant. Trachea is midline. No  pneumothorax or definite pleural effusion. No pulmonary consultations.  No substantial pulmonary opacities. Unremarkable cardiac and  mediastinal silhouette.    Impression  Impression: Negative chest radiograph.    I have personally reviewed the examination and initial interpretation  and I agree with the findings.    ADRIANA BARROS MD      SYSTEM ID:  [id number]      Chest CT: No results found for this or any previous visit from the past 365 days.      PFT: Most Recent B[place]ze Pulmonary Function Testing    No results found for: 20001  No results found for: 20002  No results found for: 20003  No results found for: 20015  No results found for: 20016  No results found for: 20027  No results found for: 20028  No results found for: 20029  No results found for: 20079  No results found for: 20080  No results found for: 20081  No results found for: 20335  No results found for: 20105  No results found for: 20053  No results found for:  20054  No results found for: 20055      Alba Knott, JADE CNP 2/6/2023   Sleep Medicine      This note was written with the assistance of the Dragon voice-dictation technology software. The final document, although reviewed, may contain errors. For corrections, please contact the office.

## 2023-02-07 ENCOUNTER — COMMITTEE REVIEW (OUTPATIENT)
Dept: TRANSPLANT | Facility: CLINIC | Age: 68
End: 2023-02-07
Payer: COMMERCIAL

## 2023-02-07 ENCOUNTER — TELEPHONE (OUTPATIENT)
Dept: TRANSPLANT | Facility: CLINIC | Age: 68
End: 2023-02-07
Payer: COMMERCIAL

## 2023-02-07 DIAGNOSIS — K70.30 CIRRHOSIS, ALCOHOLIC (H): Primary | ICD-10-CM

## 2023-02-07 DIAGNOSIS — I10 ESSENTIAL HYPERTENSION: Primary | ICD-10-CM

## 2023-02-07 NOTE — COMMITTEE REVIEW
Abdominal Committee Review Note     Evaluation Date: 11/22/2022  Committee Review Date: 2/7/2023    Organ being evaluated for: Liver    Transplant Phase: Evaluation  Transplant Status: Active    Transplant Coordinator: Meenu Saha  Transplant Surgeon:   Conor Dc    Referring Physician: Junaid Beaulieu    Primary Diagnosis: Alcohol-Associated Cirrhosis Without Acute Alcohol-Associated Hepatitis  Secondary Diagnosis:     Committee Review Members:  Nutrition Nidhi Leal, RD   Pharmacist Roe Charles, [facility]    - Clinical Jamal Bettencourt, MSW, Joi Gonsalez, [facility]   Transplant Apoorva Zaman, RN, Charito Nixon LPN, Meenu Saha, RN, Dorcas Lamb, RN, Gabriela Wright, RN, Jr Abdias Quinteros, LORIN, Lorna Mancilla, RN, Ariane Hussein, APRN CNP, Michael Solares, LORIN   Transplant Hepatology  [facility] Kaylah Bermudez MD, Manny Cid, Rafael Garcia MD, Jose Poole MD, Magda Streeter MD, Angelita Broussard MD, Dwight Green MD, Roland Peña MD, Randy Ribera MD, Thomas M. Leventhal, MD   Transplant Surgery Conor Dc MD, Mayra Espinosa NP, Fuad Rodas MD, Jhon White MD, Lorna Naik NP, [facility] Yolanda Ratliff MD, Chad Clifton MD, Damion Gibbons MD       Transplant Eligibility: Cirrhosis with MELD, ETOH, CKD    Committee Review Decision: Needs Re-presentation    Relative Contraindications: Other    Absolute Contraindications: None    Committee Chair Randy Blair MD verbally attested to the committee's decision.    Committee Discussion Details:     Patient will be re-assessed in clinic by Dr. Bermudez and Dr. Dc to re-assess transplant candidacy.      PLAN: Patient will see both Dr. Bermudez and Dr. Dc in clinic on 2/16/23

## 2023-02-07 NOTE — TELEPHONE ENCOUNTER
Spoke w/ patient and spouse Apoorva.  See prior note, they want to know if Casey will be a candidate, wanting to move forward with various life and family  based on decision of transplant decision team.    Plan is for patient to see Dr. Bermudez (10:30 a.m) and Dr. Dc (11:15) in clinic on 2/9/23 to re-assess candidacy.    They will get updated labs prior to appointment

## 2023-02-08 RX ORDER — DILTIAZEM HYDROCHLORIDE 120 MG/1
120 CAPSULE, EXTENDED RELEASE ORAL DAILY
Qty: 90 CAPSULE | Refills: 0 | Status: SHIPPED | OUTPATIENT
Start: 2023-02-08 | End: 2023-03-09

## 2023-02-08 RX ORDER — ZINC SULFATE 50(220)MG
220 CAPSULE ORAL DAILY
Qty: 90 CAPSULE | Refills: 0 | Status: SHIPPED | OUTPATIENT
Start: 2023-02-08 | End: 2023-05-17

## 2023-02-08 NOTE — TELEPHONE ENCOUNTER
Routing refill request to provider for review/approval because:  Drug not on the [facility] refill protocol   LOV 2/01/2023    Calcium Channel Blockers Protocol  Failed     Normal serum creatinine on file in past 12 months        Creatinine   Date Value Ref Range Status   01/28/2023 4.85 (H) 0.67 - 1.17 mg/dL Final   08/27/2019 1.11 0.73 - 1.18 mg/dL Final      LORIN Mckeon  [facility]

## 2023-02-09 ENCOUNTER — LAB (OUTPATIENT)
Dept: LAB | Facility: CLINIC | Age: 68
End: 2023-02-09
Attending: INTERNAL MEDICINE
Payer: COMMERCIAL

## 2023-02-09 ENCOUNTER — ANTICOAGULATION THERAPY VISIT (OUTPATIENT)
Dept: ANTICOAGULATION | Facility: CLINIC | Age: 68
End: 2023-02-09

## 2023-02-09 ENCOUNTER — OFFICE VISIT (OUTPATIENT)
Dept: GASTROENTEROLOGY | Facility: CLINIC | Age: 68
End: 2023-02-09
Attending: INTERNAL MEDICINE
Payer: COMMERCIAL

## 2023-02-09 ENCOUNTER — OFFICE VISIT (OUTPATIENT)
Dept: TRANSPLANT | Facility: CLINIC | Age: 68
End: 2023-02-09
Attending: INTERNAL MEDICINE
Payer: COMMERCIAL

## 2023-02-09 VITALS
BODY MASS INDEX: 34.19 KG/M2 | HEIGHT: 66 IN | DIASTOLIC BLOOD PRESSURE: 80 MMHG | SYSTOLIC BLOOD PRESSURE: 129 MMHG | HEART RATE: 99 BPM | WEIGHT: 212.74 LBS

## 2023-02-09 VITALS
DIASTOLIC BLOOD PRESSURE: 80 MMHG | BODY MASS INDEX: 33.99 KG/M2 | SYSTOLIC BLOOD PRESSURE: 129 MMHG | HEART RATE: 99 BPM | WEIGHT: 212 LBS | TEMPERATURE: 97.7 F

## 2023-02-09 DIAGNOSIS — Z01.818 ENCOUNTER FOR PRE-TRANSPLANT EVALUATION FOR CHRONIC LIVER DISEASE: ICD-10-CM

## 2023-02-09 DIAGNOSIS — I82.90 DEEP VEIN THROMBOSIS (DVT) OF NON-EXTREMITY VEIN, UNSPECIFIED CHRONICITY: Primary | ICD-10-CM

## 2023-02-09 DIAGNOSIS — E44.0 MODERATE PROTEIN-CALORIE MALNUTRITION (H): ICD-10-CM

## 2023-02-09 DIAGNOSIS — K70.31 ALCOHOLIC CIRRHOSIS OF LIVER WITH ASCITES (H): ICD-10-CM

## 2023-02-09 DIAGNOSIS — Z99.2 ESRD (END STAGE RENAL DISEASE) ON DIALYSIS (H): ICD-10-CM

## 2023-02-09 DIAGNOSIS — E83.110 HEREDITARY HEMOCHROMATOSIS (H): ICD-10-CM

## 2023-02-09 DIAGNOSIS — Z78.9 ALCOHOL USE: ICD-10-CM

## 2023-02-09 DIAGNOSIS — K70.30 ALCOHOLIC CIRRHOSIS OF LIVER WITHOUT ASCITES (H): ICD-10-CM

## 2023-02-09 DIAGNOSIS — K70.30 CIRRHOSIS, ALCOHOLIC (H): ICD-10-CM

## 2023-02-09 DIAGNOSIS — N18.6 ESRD (END STAGE RENAL DISEASE) ON DIALYSIS (H): ICD-10-CM

## 2023-02-09 LAB
ALBUMIN SERPL BCG-MCNC: 2.7 G/DL (ref 3.5–5.2)
ALP SERPL-CCNC: 228 U/L (ref 40–129)
ALT SERPL W P-5'-P-CCNC: 35 U/L (ref 10–50)
ANION GAP SERPL CALCULATED.3IONS-SCNC: 11 MMOL/L (ref 7–15)
AST SERPL W P-5'-P-CCNC: 43 U/L (ref 10–50)
BILIRUB DIRECT SERPL-MCNC: 0.28 MG/DL (ref 0–0.3)
BILIRUB SERPL-MCNC: 0.6 MG/DL
BUN SERPL-MCNC: 42.4 MG/DL (ref 8–23)
CALCIUM SERPL-MCNC: 8.8 MG/DL (ref 8.8–10.2)
CHLORIDE SERPL-SCNC: 101 MMOL/L (ref 98–107)
CREAT SERPL-MCNC: 4.02 MG/DL (ref 0.67–1.17)
DEPRECATED HCO3 PLAS-SCNC: 23 MMOL/L (ref 22–29)
ERYTHROCYTE [DISTWIDTH] IN BLOOD BY AUTOMATED COUNT: 15.7 % (ref 10–15)
GFR SERPL CREATININE-BSD FRML MDRD: 16 ML/MIN/1.73M2
GLUCOSE SERPL-MCNC: 189 MG/DL (ref 70–99)
HCT VFR BLD AUTO: 34.2 % (ref 40–53)
HGB BLD-MCNC: 11 G/DL (ref 13.3–17.7)
INR PPP: 1.34 (ref 0.85–1.15)
MCH RBC QN AUTO: 29.5 PG (ref 26.5–33)
MCHC RBC AUTO-ENTMCNC: 32.2 G/DL (ref 31.5–36.5)
MCV RBC AUTO: 92 FL (ref 78–100)
PLATELET # BLD AUTO: 153 10E3/UL (ref 150–450)
POTASSIUM SERPL-SCNC: 4.1 MMOL/L (ref 3.4–5.3)
PROT SERPL-MCNC: 5.2 G/DL (ref 6.4–8.3)
RBC # BLD AUTO: 3.73 10E6/UL (ref 4.4–5.9)
SODIUM SERPL-SCNC: 135 MMOL/L (ref 136–145)
WBC # BLD AUTO: 9 10E3/UL (ref 4–11)

## 2023-02-09 PROCEDURE — 80053 COMPREHEN METABOLIC PANEL: CPT | Performed by: PATHOLOGY

## 2023-02-09 PROCEDURE — 85027 COMPLETE CBC AUTOMATED: CPT | Performed by: PATHOLOGY

## 2023-02-09 PROCEDURE — 99212 OFFICE O/P EST SF 10 MIN: CPT | Performed by: INTERNAL MEDICINE

## 2023-02-09 PROCEDURE — 99000 SPECIMEN HANDLING OFFICE-LAB: CPT | Performed by: PATHOLOGY

## 2023-02-09 PROCEDURE — 99215 OFFICE O/P EST HI 40 MIN: CPT | Mod: GC | Performed by: INTERNAL MEDICINE

## 2023-02-09 PROCEDURE — 99215 OFFICE O/P EST HI 40 MIN: CPT | Mod: 57 | Performed by: TRANSPLANT SURGERY

## 2023-02-09 PROCEDURE — 85610 PROTHROMBIN TIME: CPT | Performed by: PATHOLOGY

## 2023-02-09 PROCEDURE — 36415 COLL VENOUS BLD VENIPUNCTURE: CPT | Performed by: PATHOLOGY

## 2023-02-09 PROCEDURE — 82248 BILIRUBIN DIRECT: CPT | Performed by: PATHOLOGY

## 2023-02-09 PROCEDURE — 99212 OFFICE O/P EST SF 10 MIN: CPT | Mod: 27 | Performed by: TRANSPLANT SURGERY

## 2023-02-09 PROCEDURE — 80321 ALCOHOLS BIOMARKERS 1OR 2: CPT | Mod: 90 | Performed by: PATHOLOGY

## 2023-02-09 ASSESSMENT — PAIN SCALES - GENERAL: PAINLEVEL: NO PAIN (0)

## 2023-02-09 NOTE — PROGRESS NOTES
"Anticoagulation Management    Damion Quinones, 67 year old, male is on apixaban. Requested to review for conversion to warfarin.    Indication for Anticoagulation: ACUTE DVT(01/23/2023),  Atrial Fibrillation ([id number] = 3 (Hypertension, Age 65-74 and Vascular- DVT)-not on anticoagulation for A fib prior to DVT    Goal INR Range: 2.0-3.0     Ethnicity:Not  or     Race: White      Tobacco Use      Smoking status: Never      Smokeless tobacco: Never      Social History    Substance and Sexual Activity      Alcohol use: Not Currently        Comment: Last ETOH use was 12/31/2021        Lab Results   Component Value Date    INR 1.34 (H) 02/09/2023       Wt Readings from Last 2 Encounters:   02/09/23 96.5 kg (212 lb 11.9 oz)   02/09/23 96.2 kg (212 lb)       Estimated body mass index is 34.11 kg/m  as calculated from the following:    Height as of an earlier encounter on 2/9/23: 1.682 m (5' 6.22\").    Weight as of an earlier encounter on 2/9/23: 96.5 kg (212 lb 11.9 oz).    Lab Results   Component Value Date    HGB 11.0 02/09/2023    HCT 34.2 02/09/2023     02/09/2023     Lab Results   Component Value Date    CR 4.02 02/09/2023    CR 4.85 01/28/2023    CR 1.11 08/27/2019        Lab Results   Component Value Date    ALT 35 02/09/2023    AST 43 02/09/2023    ALBUMIN 2.7 02/09/2023     Significant PMH: liver cirrhosis    Significant Medication Interactions: None    Previously on warfarin: No     RECOMMENDATION     Eliquis to warfarin for pending liver transplant listing.    With Acute DVT < 30 days:  Ideal management would be to stop warfairn and start Enoxaparin (Lovenox) and bridge to warfarin. Lovenox CI in setting of ESRD.      Alternatives:    Consider overlap with heparin subcutaneous if able to obtain outpatient/have insurance coverage (often complicated with ESRD dx)      Overlap with Eliquis (although may have INR interference)    Prefer to overlap at least 30 days post clot to reduce risk " from interference if stopped and INR low      INR interference may be more difficult to interpret in setting of liver disease; may be very sensitive      Consider anticoagulation transition once initial 30-90 days anticoagulation treatment completed depending on timeline for listing for transplant.    Will discuss transition options with Sandra Bermudez and Virgilio.    Darlene Lares, [facility]

## 2023-02-09 NOTE — LETTER
2/9/2023         RE: Damion Quinones   [address] [facility] [address]        Dear Colleague,    Thank you for referring your patient, Damion Quinones, to the [facility] [place]. Please see a copy of my visit note below.    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    The patient has decompensated alcohol and hemochromatosis related cirrhosis.  He has a history of variceal bleeding but underwent TIPS placement in October 2022 with no further episodes of bleeding.  He has a history of hepatic encephalopathy that is managed with maximal medical therapy; recent admission for hepatic encephalopathy in the setting of an infection/C diff.  During his recent admission he was started on dialysis which he undergoes on Monday Wednesday and Friday. MELD-Na: 24, which is mostly driven by his renal dysfunction.    He has undergone cardiac testing including a Lexiscan with no inducible ischemia.  Given he is now on dialysis he would benefit from a left heart catheterization given he has multiple comorbid conditions that predispose him to CAD including obesity and HTN.  Per patient unable to perform cardiac MRI for iron assessment given patient has a TIPS in place. Physical exam today demonstrates good strength no evidence of frailty. If there are no concerning findings on left heart catheterization, we will plan to discuss him at transplant conference for potential simultaneous kidney liver transplant conference as he has completed the rest of his work-up    ABO: O positive    Plan  -- Recommend left heart catheterization  -- Recommend switching from apixaban to  warfarin given potential transplant listing; goal INR 2-3  -- Agree with weaning prednisone  -- Agree with sleep apnea testing given STOPBANG 4/8  -- Continue lactulose + rifaximin along with zinc replacement. Avoid sedating medications   * Recommend avoiding zolpidem given recurrent hepatic encephalopathy  -- HCC screening -abdominal ultrasound and AFP every 6 months; due July 2023.    Other:  -- CRC Surveillance: Repeat due in 2023 given 5 polyps removed in 2020; path: tubular adenomas    RTC: 3 months    Discussed with attending hepatologist, Dr. Leventhal Elizabeth Aby, MD  Transplant Hepatology Fellow  [phone]    HPI:  Alcohol Cirrhosis  - hx HE  - no hx ascites  - hx variceal bleed s/p TIPS (10/1/2022)  - last EGD 9/2022: Small EV, IGV1 oozing, portal HTN gastropathy, IGV2 - s/p TIPS 10/1/2022  - HCC screening US 1/2023 - no hepatic lesions    Presented with wife Nabila    He was recently hospitalized from January 23 to January 28, 2023 for hepatic encephalopathy and worsening renal dysfunction.  He was started on dialysis.  He was also noted to have a right lower extremity DVT for which she was started on apixaban.  He also developed recurrent C. difficile infection for which she was started on fidoximicin.  Since he left the hospital he has been exercising.  He reports that on dialysis days he has been walking in the store with his wife without any issues.  On his nondialysis days he has been using a recumbent bike for 3 to 5 miles per day.  He reports that his energy level ebbs and flows.  He denies any abdominal fluid buildup or lower extremity edema.  He has not had any confusion following his hospital stay.  His bowel movements have returned to soft bowel movements.  He and his wife have been working on adequate protein intake.    Varices  - History of gastric variceal bleeding s/p TIPS 10/1/2022  - Since TIPS placement he has not had any issues with melena, hematochezia or hematemesis.    Hepatic  Encephalopathy  - Developed HE following TIPS placement, on maximal medical therapy of lactulose + rifaximin   - Admission 9/30-10/3/2022: Presented with altered mental status following TIPS. Improved with initiation of lactulose, rifaximin and zinc initiation  - Admission 1/2022: Presented with altered mental status in the setting of C. difficile infection as well as DVT.  - Currently doing much well on maximal medical therapy with lactulose and rifaximin.  Reports adherence to lactulose twice a day with 3 soft bowel movements per day without blood.  Recently started on zolpidem per his primary care doctor.  He snores at night.  He has not undergone sleep apnea testing yet.    Alcohol use:  - Last drink: 12/31/2020, last heavy use 2016  - Pattern of use: 12 beers a day. Started use in teen years, increased gradually in the last 1.5-2 years.   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  Undergoes dialysis with about 2 L removed every Monday Wednesday and Friday.  - Started on prednisone in September 2022, max dose: 80mg per day, now weaning - currently at 5mg per day  - Rituximab infusions, started 10/2022; has undergone 3 infusions and plan for next infusion in 5-6 months  - Minimal urine protein noted (6.8mg/dL)  - Myeloperoxidase Ab 9.4 (H); proteinase 3 Ab units < 0.7 (wnl); C3 97 (wnl) + C4 19.7 (wnl)    Psoriatic Arthritis  - Issues with arthritis with joint deformities in his hands bilaterally. Was on Humira from 2018 to 8/2022, now off.     Hemochromatosis - H63D homozygous  - Previously underwent phlebotomy x 2 prior to 2020, none since    Medical hx Surgical hx   Past Medical History:   Diagnosis Date     Alcoholic cirrhosis of liver with ascites (H) 10/11/2019     Arthritis     RA     C. difficile colitis      Chronic kidney disease      Coronary artery disease     AFib  2016     Diabetes mellitus type 2, uncomplicated (H) 10/11/2019     History of blood transfusion     2016     History of hemochromatosis 10/11/2019     Hypertension      Hypokalemia      Obesity      PAF (paroxysmal atrial fibrillation) (H)      Psoriatic arthritis (H)       Past Surgical History:   Procedure Laterality Date     APPENDECTOMY      Removed at 18 Years Old      COLONOSCOPY      2014 at [facility].      EYE SURGERY Bilateral     Cataract     HERNIA REPAIR      History of bilateral inguinal hernia repair: 10/28/2014. Open hernia repair: 10/2017. Abdominal wound exploration and debridement 12/27/2017     INSERT SHUNT PORTAL TRANSJUGULAR INTRAHEPTIC  09/26/2022     IR CVC TUNNEL PLACEMENT > 5 YRS OF AGE  01/26/2023   + knee replacements x 2       Medications  Current Outpatient Medications   Medication Sig Dispense Refill     spironolactone (ALDACTONE) 50 MG tablet Take 25 mg by mouth daily       apixaban ANTICOAGULANT (ELIQUIS) 5 MG tablet Take 1 tablet (5 mg) by mouth 2 times daily 120 tablet 0     bumetanide (BUMEX) 1 MG tablet 2 mg 2 times daily       Calcium Carbonate-Vitamin D 500-3.125 MG-MCG TABS Take 1 tablet by mouth 2 times daily       diltiazem ER (DILT-XR) 120 MG 24 hr capsule Take 1 capsule (120 mg) by mouth daily 90 capsule 0     folic acid (FOLVITE) 1 MG tablet TAKE FIVE TABLETS BY MOUTH EVERY DAY       lactulose (CHRONULAC) 10 GM/15ML solution Take 20 g by mouth 3 times daily       multivitamin w/minerals (THERA-VIT-M) tablet Take 1 tablet by mouth daily       omeprazole 20 MG tablet Take 40 mg by mouth 2 times daily       predniSONE (DELTASONE) 5 MG tablet Take 5 mg by mouth       XIFAXAN 550 MG TABS tablet Take 550 mg by mouth 2 times daily       zinc sulfate (ZINCATE) 220 (50 Zn) MG capsule Take 1 capsule (220 mg) by mouth daily 90 capsule 0     zolpidem (AMBIEN) 10 MG tablet Take 1 tablet (10 mg) by mouth nightly as needed for sleep 1 tablet 0       Allergies  No Known  Allergies    Family hx Social hx   Family History   Problem Relation Age of Onset     Lung Cancer Mother      Colon Cancer Father      Lung Cancer Brother      Heart Failure Brother      Kidney Disease Brother    - Brother: lung cancer in 50s  - Brother: heart disease (unclear what), renal disease (unclear what)  - Brother: colon issues (unclear what)   - Alcohol: as above  - Tobacco: Denies  - Drugs: Denies  -  (Apoorva), has two adult children   - Retired, previously in IT. Hasn't worked sine 2019.  - Stays active.  He has been using up recumbent bike every other day and then walking on days where he does not bike.     Review of systems  A 10-point review of systems was negative.    Examination  /80   Pulse 99   Temp 97.7  F (36.5  C)   Wt 96.2 kg (212 lb)   BMI 33.99 kg/m    Body mass index is 33.99 kg/m .    Gen-NAD  Eye- EOMI  ENT- MMM, normal oropharynx  CVS- Irregular rate and rhythm.   RS- CTA bilaterally  Abd- Distended. Non-tender throughout. Umbilical hernia - reducible  Skin-catheter in left upper chest clean dry and intact.  Extr- 2+ radial pulses bilaterally, trace lower extremity edema bilaterally  MS- hands without clubbing  Neuro- A+Ox3, mild asterixis  Skin- no rash. No jaundice. Duptyrens contractures bilaterally.  Ecchymoses on his arms bilaterally.  Psych- normal mood    Laboratory  BMP  Recent Labs   Lab Test 02/09/23 0930 01/28/23 0636 01/27/23  0604 01/26/23  0538   * 133* 135* 138   POTASSIUM 4.1 4.3 4.4 4.4   CHLORIDE 101 99 102 103   NAZARIO 8.8 8.7* 8.8 9.1   CO2 23 19* 17* 16*   BUN 42.4* 58.4* 85.2* 86.7*   CR 4.02* 4.85* 6.32* 6.38*   * 84 101* 87     CBC  Recent Labs   Lab Test 02/09/23 0930 01/28/23  0636 01/27/23  0604 01/26/23  0538   WBC 9.0 13.3* 12.4* 13.8*   RBC 3.73* 3.93* 3.84* 4.14*   HGB 11.0* 11.3* 11.1* 12.0*   HCT 34.2* 35.9* 34.5* 37.3*   MCV 92 91 90 90   MCH 29.5 28.8 28.9 29.0   MCHC 32.2 31.5 32.2 32.2   RDW 15.7* 14.9 14.9 14.8     "106* 106* 116*     Liver Function Studies - Recent Labs   Lab Test 02/09/23  0930   PROTTOTAL 5.2*   ALBUMIN 2.7*   BILITOTAL 0.6   ALKPHOS 228*   AST 43   ALT 35       INR   INR   Date Value Ref Range Status   02/09/2023 1.34 (H) 0.85 - 1.15 Final      Iron sat 41%; ferritin 896  MIGUEL A positive  Hep A IgG negative  Hep B s Ag negative  Hep B s Ab < 2.0  Hep B c Ab negative  Hep C ab negative    Hemochromatosis: negative C282Y, homozygous H63D, negative S65C    Radiology  Abdominal US TIPS 1/2023  1. Visualization limited due to patient body habitus and inability to cooperate with the examination.\  2. Patent TIPS. Right and left portal veins are appropriatelyretrograde in flow towards the TIPS.    TTE 9/25/22  1. Technically difficult exam.   2. The left ventricular size is normal.  Systolic function is normal.The estimated ejection fraction is 60-65%.  Wall thickness is mildly to moderately increased.  Left ventricular segmental wall motion is grossly normal, however endocardial definition is limited.   3. Visualized limited portions of the right ventricle are normal.  4. The left atrium is severely enlarged.   5. There is likely borderline aortic valve stenosis with a peak velocity of 149.00 cm/s, mean gradient of 5 mmHg, and aortic valve area of 1.87 cm2. Dimensionless indez of 0.54  6. The proximal ascending aorta measures 3.80 cm with an index of 1.66 cm/m2.   7. There is no gross pericardial effusion.     Renal Biopsy 9/9/2022  A-C. Native Kidney biopsy:  - Focal global and focal segmental glomerular sclerosis (13% and 7%) with glomerular IgA deposition, see comment.  Interstitial hemorrhage and tubulointerstitial inflammation.  - Severe interstitial fibrosis and tubular atrophy (~80%).  - Severe arteriosclerosis.     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 In regards to the MPO positivity, " necrotizing/crescentic glomular injury is not observed, and overt vasculitis is not identified. However, the presence of interstitial hemorrhage and tubulointerstitial inflammation raise the possibility of an unsampled vasculitis.    DEXA 2018: No evidence of osteopenia/osteoporosis.     Endoscopy  EGD 9/2022: Small EV, IGV1 oozing, portal HTN gastropathy, IGV2, no gastric ulcers, normal duodenum    Colonoscopy 7/31/2020:  - Four 2 to 3 mm polyps in the descending colon, in  the transverse colon and in the ascending colon, removed with a jumbo cold forceps. Resected and retrieved.  - One 8 mm polyp in the descending colon, removed with a hot snare. Resected and retrieved. Clip (MR conditional) was placed.   Pathology: Tubular adenomas     Attestation signed by Leventhal, Thomas Michael, MD at 2/9/2023  3:33 PM:  Staff Physician Attestation  I, Thomas M. Leventhal, M.D., discussed and examined this patient with the fellow and agree with the fellow s findings and plan of care as documented in the fellow s note.  I personally reviewed vital signs, medications, labs, and imaging.     67y M with alcohol related cirrhosis complicated by progressive kidney injury now on dialysis  - The patient is NOT frail on today's visit  - Plan for Left heart cath given multiple risk factors and inability to perform other non-invasive tests (MRI cardiac eval with significant interference from TIPS)  - Will transition patient from Eliquis to warfarin for anticoagulation    Thomas M. Leventhal, M.D.   of Medicine  Advanced & Transplant Hepatology  [facility]  Date of Service: 02/09/23

## 2023-02-09 NOTE — Clinical Note
- Needs urgent Left Heart cath (per OUR order, not cardiology) - Needs to switch from Eliquis to warfarin ASAP (Dr. Dc, Dr Bermudez, and I all saw the patient and agree with this plan

## 2023-02-09 NOTE — LETTER
2/9/2023         RE: Damion Quinones   [address] [facility]– [place][note 2] [id number]        Dear Colleague,    Thank you for referring your patient, Damion Quinones, to the [facility] TRANSPLANT [facility]. Please see a copy of my visit note below.      Assessment and Plan:  1. Liver/kidney transplant evaluation - patient is a good candidate overall. Benefits and surgical risks of a liver transplantation were discussed.  2.  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 Portal Vein:Patent  2. Hepatic Artery: Open  3. TIPS: present  4. Previous Abdominal Surgery: Yes- open appendectomy, umbilical/inguinal hernia repairs  5. Hepatocellular Carcinoma: None  6. Ascites: None  7. Costal Angle: narrow  8. Portopulmonary Hypertension: absent  9. Hepatopulmonary Syndrome: absent  10. Cardiac Evaluation: needs heart catheterization  11. Nutritional Status: Moderate  12. Diabetes: no  13.Hypertension no  14. Smoker:no  14: Fraility index:no  15. Meets guidelines to receive Living Donor  No  16. Potential Living donors No    Recommendations: has recently started HD and volume status is improved, he looks clinically better than when I saw him in Nov 2022, needs complete cardiac evaluation, BMI 34, would need SLK.  Recent DVT on eliquis, MELD dependent on dialysis and anticoagulation      Patients overall evaluation will be discussed at the Liver Transplant selection committee meeting with a final recommendation on the patients suitability for transplant to be made at that time.    Consult Full  Details:  Damion Quinones was seen in consultation at the request of Dr. Poole/Christofer/Shae for evaluation as a potential SLK transplant recipient.    Reason for Visit:  Damion Quinones is a 67 year old year old male with hemochromatosis/EtOH, who presents for SLK transplant evaluation.    HPI:   Presenting complaint: kidney failure      68 y/o male with cirrhosis secondary to hemochromatosis/EtOH and ESRD who  presents for transplant evaluation.  First diagnosed with liver disease when he presented with a sig upper GI bleed in 2016, ultimately discharged but subsequently re-presented in Sept of this year with another sig GI bleed requiring emergent TIPS.  Over the course of the past 6 months has had declining renal function, native renal biopsy showed ? 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  He also has history of psoriasis/psoriatic arthritis/RA for which he had been taking Humira then transitioned to to prednisone and a new biologic. Recently started HD and volume status is much improved.  He states hs is more active physically including stationary bike and walking around the supermarket when out with is wife.    . Recent DVT diagnosis on Eliquis, MELD is up to 24 with dialysis initiation and would be dependent on anticoagulation as wel.  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 Liver dz/Colon CA  3 bothers- all - lung CA- smoker, heart dz, ? Kidney dz    Past Medical History:   Diagnosis Date     Alcoholic cirrhosis of liver with ascites (H) 10/11/2019     Arthritis     RA     C. difficile colitis      Chronic kidney disease      Coronary artery disease     AFib 2016     Diabetes mellitus type 2, uncomplicated (H) 10/11/2019     History of blood transfusion     2016     History of hemochromatosis 10/11/2019     Hypertension      Hypokalemia      Obesity      PAF (paroxysmal atrial fibrillation) (H)      Psoriatic  arthritis (H)      Past Surgical History:   Procedure Laterality Date     APPENDECTOMY      Removed at 18 Years Old      COLONOSCOPY      2014 at [facility].      EYE SURGERY Bilateral     Cataract     HERNIA REPAIR      History of bilateral inguinal hernia repair: 10/28/2014. Open hernia repair: 10/2017. Abdominal wound exploration and debridement 12/27/2017     INSERT SHUNT PORTAL TRANSJUGULAR INTRAHEPTIC  09/26/2022     IR CVC TUNNEL PLACEMENT > 5 YRS OF AGE  01/26/2023     Past Surgical History:   Procedure Laterality Date     APPENDECTOMY      Removed at 18 Years Old      COLONOSCOPY      2014 at [facility].      EYE SURGERY Bilateral     Cataract     HERNIA REPAIR      History of bilateral inguinal hernia repair: 10/28/2014. Open hernia repair: 10/2017. Abdominal wound exploration and debridement 12/27/2017     INSERT SHUNT PORTAL TRANSJUGULAR INTRAHEPTIC  09/26/2022     IR CVC TUNNEL PLACEMENT > 5 YRS OF AGE  01/26/2023     Family History   Problem Relation Age of Onset     Lung Cancer Mother      Colon Cancer Father      Lung Cancer Brother      Heart Failure Brother      Kidney Disease Brother      No Known Allergies  Prior to Admission medications    Medication Sig Start Date End Date Taking? Authorizing Provider   apixaban ANTICOAGULANT (ELIQUIS) 5 MG tablet Take 1 tablet (5 mg) by mouth 2 times daily 2/1/23  Yes Jared Sanderson MD   bumetanide (BUMEX) 1 MG tablet 2 mg 2 times daily 10/22/22  Yes Reported, Patient   Calcium Carbonate-Vitamin D 500-3.125 MG-MCG TABS Take 1 tablet by mouth 2 times daily 9/21/22  Yes Reported, Patient   diltiazem ER (DILT-XR) 120 MG 24 hr capsule Take 1 capsule (120 mg) by mouth daily 2/8/23  Yes Gary Serrano MD   folic acid (FOLVITE) 1 MG tablet TAKE FIVE TABLETS BY MOUTH EVERY DAY 9/19/22  Yes Reported, Patient   lactulose (CHRONULAC) 10 GM/15ML solution Take 20 g by mouth 3 times daily 11/19/22  Yes Reported, Patient   multivitamin w/minerals (THERA-VIT-M)  "tablet Take 1 tablet by mouth daily   Yes Reported, Patient   omeprazole 20 MG tablet Take 40 mg by mouth 2 times daily   Yes Unknown, Entered By History   predniSONE (DELTASONE) 5 MG tablet Take 5 mg by mouth 11/8/22  Yes Reported, Patient   spironolactone (ALDACTONE) 50 MG tablet Take 25 mg by mouth daily   Yes Reported, Patient   XIFAXAN 550 MG TABS tablet Take 550 mg by mouth 2 times daily 10/31/22  Yes Reported, Patient   zinc sulfate (ZINCATE) 220 (50 Zn) MG capsule Take 1 capsule (220 mg) by mouth daily 2/8/23  Yes Gary Serrano MD   zolpidem (AMBIEN) 10 MG tablet Take 1 tablet (10 mg) by mouth nightly as needed for sleep 2/6/23  Yes Alba Knott APRN CNP       Previous Transplant Hx: No    Cardiovascular Hx:       h/o Cardiac Issues: No       Exercise Tolerance: no chest pain or shortness of breath with exertion.    Potential Donor(s): No    ROS:    REVIEW OF SYSTEMS (check box if normal)  [x]                GENERAL  [x]                  PULMONARY [x]                 GENITOURINARY  [x]                 CNS                 [x]                  CARDIAC  [x]                  ENDOCRINE  [x]                 EARS,NOSE,THROAT [x]                  GASTROINTESTINAL [x]                  NEUROLOGIC    [x]                 MUSCLOSKELTAL  [x]                   HEMATOLOGY    Examination:     Vitals:  /80   Pulse 99   Ht 1.682 m (5' 6.22\")   Wt 96.5 kg (212 lb 11.9 oz)   BMI 34.11 kg/m      GENERAL APPEARANCE: alert and no distress  EYES: PERRL  HENT: mouth without ulcers or lesions  NECK: supple, no adenopathy  RESP: lungs clear to auscultation - no rales, rhonchi or wheezes  CV: regular rhythm, normal rate, no rub   ABDOMEN:  soft, nontender, no HSM or masses and bowel sounds normal  MS: extremities normal- no gross deformities noted, no evidence of inflammation in joints, no muscle tenderness  SKIN: no rash  NEURO: Normal strength and tone, sensory exam grossly normal, mentation intact and " speech normal  PSYCH: mentation appears normal. and affect normal/bright      Results:   Recent Results (from the past 168 hour(s))   Basic metabolic panel    Collection Time: 02/09/23  9:30 AM   Result Value Ref Range    Sodium 135 (L) 136 - 145 mmol/L    Potassium 4.1 3.4 - 5.3 mmol/L    Chloride 101 98 - 107 mmol/L    Carbon Dioxide (CO2) 23 22 - 29 mmol/L    Anion Gap 11 7 - 15 mmol/L    Urea Nitrogen 42.4 (H) 8.0 - 23.0 mg/dL    Creatinine 4.02 (H) 0.67 - 1.17 mg/dL    Calcium 8.8 8.8 - 10.2 mg/dL    Glucose 189 (H) 70 - 99 mg/dL    GFR Estimate 16 (L) >60 mL/min/1.73m2   Hepatic panel    Collection Time: 02/09/23  9:30 AM   Result Value Ref Range    Protein Total 5.2 (L) 6.4 - 8.3 g/dL    Albumin 2.7 (L) 3.5 - 5.2 g/dL    Bilirubin Total 0.6 <=1.2 mg/dL    Alkaline Phosphatase 228 (H) 40 - 129 U/L    AST 43 10 - 50 U/L    ALT 35 10 - 50 U/L    Bilirubin Direct 0.28 0.00 - 0.30 mg/dL   Phosphatidylethanol (PEth), Whole Blood    Collection Time: 02/09/23  9:30 AM   Result Value Ref Range    PEth 16:0/18:1 (POPEth) <10 ng/mL    PEth 16:0/18:2 (PLPEth) <10 ng/mL   INR    Collection Time: 02/09/23  9:30 AM   Result Value Ref Range    INR 1.34 (H) 0.85 - 1.15   CBC with platelets    Collection Time: 02/09/23  9:30 AM   Result Value Ref Range    WBC Count 9.0 4.0 - 11.0 10e3/uL    RBC Count 3.73 (L) 4.40 - 5.90 10e6/uL    Hemoglobin 11.0 (L) 13.3 - 17.7 g/dL    Hematocrit 34.2 (L) 40.0 - 53.0 %    MCV 92 78 - 100 fL    MCH 29.5 26.5 - 33.0 pg    MCHC 32.2 31.5 - 36.5 g/dL    RDW 15.7 (H) 10.0 - 15.0 %    Platelet Count 153 150 - 450 10e3/uL     I had a long discussion with the patient regarding liver transplantation which included but was not limited to  the following points:    1. Liver transplant selection committee process.  2. The federal rules for cadaveric waiting list, the size and blood type matching of the organ. The availability of living-related donor transplantation.  3. The types of donors: brain death  donors, non-heart beating donors, partial liver grafts: splits and living donor grafts  4. Extended criteria  Donors (older age, steasosis) and the increased  risk of primary non-function using the extended criteria donors  5. The CDC high risk donors,  Risk of donor transmitted infections and donor transmitted malignancy  6. The liver transplant operation and the associated risks and technical complications which can include intraoperative death, post operative death,  Primary non-function, bleeding requiring re-operations, arterial and biliary complications, bowel perforations, and intra abdominal abscess. Some of these complicaitons may require a second operation.  7. The postoperative course, the ICU stay and risk of postoperative complications which can include sepsis, MI, stroke, brain injury, pneumonia, pleural effusions, and renal dysfunction.  8. The current 1 year and 5 year graft and patient survivals.  9. The need for life long immunosuppressive therapy and the side effects of these medications, including the possibility of toxicity, opportunistic infections, risk of cancer including lymphoma, and the possibility of rejection even if the patient is taking the medication exactly as prescribed.  10. The need for compliance with medications and follow-up visits in the clinic and thereafter.  11. The patient and family understand these risks and wish to proceed to transplantation           Again, thank you for allowing me to participate in the care of your patient.        Sincerely,        Conor Dc MD

## 2023-02-09 NOTE — PROGRESS NOTES
[facility] Hepatology    Assessment  67 year old male with PMHx of decompensated alcohol + hemochromatosis (homozygous H63D) cirrhosis complicated by variceal bleeding s/p TIPS (10/1/2022) and hepatic encephalopathy. 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    The patient has decompensated alcohol and hemochromatosis related cirrhosis.  He has a history of variceal bleeding but underwent TIPS placement in October 2022 with no further episodes of bleeding.  He has a history of hepatic encephalopathy that is managed with maximal medical therapy; recent admission for hepatic encephalopathy in the setting of an infection/C diff.  During his recent admission he was started on dialysis which he undergoes on Monday Wednesday and Friday. MELD-Na: 24, which is mostly driven by his renal dysfunction.    He has undergone cardiac testing including a Lexiscan with no inducible ischemia.  Given he is now on dialysis he would benefit from a left heart catheterization given he has multiple comorbid conditions that predispose him to CAD including obesity and HTN.  Per patient unable to perform cardiac MRI for iron assessment given patient has a TIPS in place. Physical exam today demonstrates good strength no evidence of frailty. If there are no concerning findings on left heart catheterization, we will plan to discuss him at transplant conference for potential simultaneous kidney liver transplant conference as he has completed the rest of his work-up    ABO: O positive    Plan  -- Recommend left heart catheterization  -- Recommend switching from apixaban to warfarin given potential transplant listing; goal INR 2-3  -- Agree with weaning prednisone  -- Agree with sleep apnea testing given STOPBANG 4/8  -- Continue lactulose + rifaximin along with zinc replacement. Avoid sedating medications   * Recommend avoiding  zolpidem given recurrent hepatic encephalopathy  -- HCC screening -abdominal ultrasound and AFP every 6 months; due July 2023.    Other:  -- CRC Surveillance: Repeat due in 2023 given 5 polyps removed in 2020; path: tubular adenomas    RTC: 3 months    Discussed with attending hepatologist, Dr. Leventhal Elizabeth Aby, MD  Transplant Hepatology Fellow  [phone]    HPI:  Alcohol Cirrhosis  - hx HE  - no hx ascites  - hx variceal bleed s/p TIPS (10/1/2022)  - last EGD 9/2022: Small EV, IGV1 oozing, portal HTN gastropathy, IGV2 - s/p TIPS 10/1/2022  - HCC screening US 1/2023 - no hepatic lesions    Presented with wife Nabila    He was recently hospitalized from January 23 to January 28, 2023 for hepatic encephalopathy and worsening renal dysfunction.  He was started on dialysis.  He was also noted to have a right lower extremity DVT for which she was started on apixaban.  He also developed recurrent C. difficile infection for which she was started on fidoximicin.  Since he left the hospital he has been exercising.  He reports that on dialysis days he has been walking in the store with his wife without any issues.  On his nondialysis days he has been using a recumbent bike for 3 to 5 miles per day.  He reports that his energy level ebbs and flows.  He denies any abdominal fluid buildup or lower extremity edema.  He has not had any confusion following his hospital stay.  His bowel movements have returned to soft bowel movements.  He and his wife have been working on adequate protein intake.    Varices  - History of gastric variceal bleeding s/p TIPS 10/1/2022  - Since TIPS placement he has not had any issues with melena, hematochezia or hematemesis.    Hepatic Encephalopathy  - Developed HE following TIPS placement, on maximal medical therapy of lactulose + rifaximin   - Admission 9/30-10/3/2022: Presented with altered mental status following TIPS. Improved with initiation of lactulose, rifaximin and zinc  initiation  - Admission 1/2022: Presented with altered mental status in the setting of C. difficile infection as well as DVT.  - Currently doing much well on maximal medical therapy with lactulose and rifaximin.  Reports adherence to lactulose twice a day with 3 soft bowel movements per day without blood.  Recently started on zolpidem per his primary care doctor.  He snores at night.  He has not undergone sleep apnea testing yet.    Alcohol use:  - Last drink: 12/31/2020, last heavy use 2016  - Pattern of use: 12 beers a day. Started use in teen years, increased gradually in the last 1.5-2 years.   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  Undergoes dialysis with about 2 L removed every Monday Wednesday and Friday.  - Started on prednisone in September 2022, max dose: 80mg per day, now weaning - currently at 5mg per day  - Rituximab infusions, started 10/2022; has undergone 3 infusions and plan for next infusion in 5-6 months  - Minimal urine protein noted (6.8mg/dL)  - Myeloperoxidase Ab 9.4 (H); proteinase 3 Ab units < 0.7 (wnl); C3 97 (wnl) + C4 19.7 (wnl)    Psoriatic Arthritis  - Issues with arthritis with joint deformities in his hands bilaterally. Was on Humira from 2018 to 8/2022, now off.     Hemochromatosis - H63D homozygous  - Previously underwent phlebotomy x 2 prior to 2020, none since    Medical hx Surgical hx   Past Medical History:   Diagnosis Date     Alcoholic cirrhosis of liver with ascites (H) 10/11/2019     Arthritis     RA     C. difficile colitis      Chronic kidney disease      Coronary artery disease     AFib 2016     Diabetes mellitus type 2, uncomplicated (H) 10/11/2019     History of blood transfusion     2016     History of hemochromatosis 10/11/2019     Hypertension      Hypokalemia      Obesity      PAF (paroxysmal atrial fibrillation) (H)       Psoriatic arthritis (H)       Past Surgical History:   Procedure Laterality Date     APPENDECTOMY      Removed at 18 Years Old      COLONOSCOPY      2014 at [facility].      EYE SURGERY Bilateral     Cataract     HERNIA REPAIR      History of bilateral inguinal hernia repair: 10/28/2014. Open hernia repair: 10/2017. Abdominal wound exploration and debridement 12/27/2017     INSERT SHUNT PORTAL TRANSJUGULAR INTRAHEPTIC  09/26/2022     IR CVC TUNNEL PLACEMENT > 5 YRS OF AGE  01/26/2023   + knee replacements x 2       Medications  Current Outpatient Medications   Medication Sig Dispense Refill     spironolactone (ALDACTONE) 50 MG tablet Take 25 mg by mouth daily       apixaban ANTICOAGULANT (ELIQUIS) 5 MG tablet Take 1 tablet (5 mg) by mouth 2 times daily 120 tablet 0     bumetanide (BUMEX) 1 MG tablet 2 mg 2 times daily       Calcium Carbonate-Vitamin D 500-3.125 MG-MCG TABS Take 1 tablet by mouth 2 times daily       diltiazem ER (DILT-XR) 120 MG 24 hr capsule Take 1 capsule (120 mg) by mouth daily 90 capsule 0     folic acid (FOLVITE) 1 MG tablet TAKE FIVE TABLETS BY MOUTH EVERY DAY       lactulose (CHRONULAC) 10 GM/15ML solution Take 20 g by mouth 3 times daily       multivitamin w/minerals (THERA-VIT-M) tablet Take 1 tablet by mouth daily       omeprazole 20 MG tablet Take 40 mg by mouth 2 times daily       predniSONE (DELTASONE) 5 MG tablet Take 5 mg by mouth       XIFAXAN 550 MG TABS tablet Take 550 mg by mouth 2 times daily       zinc sulfate (ZINCATE) 220 (50 Zn) MG capsule Take 1 capsule (220 mg) by mouth daily 90 capsule 0     zolpidem (AMBIEN) 10 MG tablet Take 1 tablet (10 mg) by mouth nightly as needed for sleep 1 tablet 0       Allergies  No Known Allergies    Family hx Social hx   Family History   Problem Relation Age of Onset     Lung Cancer Mother      Colon Cancer Father      Lung Cancer Brother      Heart Failure Brother      Kidney Disease Brother    - Brother: lung cancer in 50s  - Brother:  heart disease (unclear what), renal disease (unclear what)  - Brother: colon issues (unclear what)   - Alcohol: as above  - Tobacco: Denies  - Drugs: Denies  -  (Apoorva), has two adult children   - Retired, previously in IT. Hasn't worked sine 2019.  - Stays active.  He has been using up recumbent bike every other day and then walking on days where he does not bike.     Review of systems  A 10-point review of systems was negative.    Examination  /80   Pulse 99   Temp 97.7  F (36.5  C)   Wt 96.2 kg (212 lb)   BMI 33.99 kg/m    Body mass index is 33.99 kg/m .    Gen-NAD  Eye- EOMI  ENT- MMM, normal oropharynx  CVS- Irregular rate and rhythm.   RS- CTA bilaterally  Abd- Distended. Non-tender throughout. Umbilical hernia - reducible  Skin-catheter in left upper chest clean dry and intact.  Extr- 2+ radial pulses bilaterally, trace lower extremity edema bilaterally  MS- hands without clubbing  Neuro- A+Ox3, mild asterixis  Skin- no rash. No jaundice. Duptyrens contractures bilaterally.  Ecchymoses on his arms bilaterally.  Psych- normal mood    Laboratory  BMP  Recent Labs   Lab Test 02/09/23  0930 01/28/23  0636 01/27/23  0604 01/26/23  0538   * 133* 135* 138   POTASSIUM 4.1 4.3 4.4 4.4   CHLORIDE 101 99 102 103   NAAZRIO 8.8 8.7* 8.8 9.1   CO2 23 19* 17* 16*   BUN 42.4* 58.4* 85.2* 86.7*   CR 4.02* 4.85* 6.32* 6.38*   * 84 101* 87     CBC  Recent Labs   Lab Test 02/09/23  0930 01/28/23  0636 01/27/23  0604 01/26/23  0538   WBC 9.0 13.3* 12.4* 13.8*   RBC 3.73* 3.93* 3.84* 4.14*   HGB 11.0* 11.3* 11.1* 12.0*   HCT 34.2* 35.9* 34.5* 37.3*   MCV 92 91 90 90   MCH 29.5 28.8 28.9 29.0   MCHC 32.2 31.5 32.2 32.2   RDW 15.7* 14.9 14.9 14.8    106* 106* 116*     Liver Function Studies - Recent Labs   Lab Test 02/09/23  0930   PROTTOTAL 5.2*   ALBUMIN 2.7*   BILITOTAL 0.6   ALKPHOS 228*   AST 43   ALT 35       INR   INR   Date Value Ref Range Status   02/09/2023 1.34 (H) 0.85 - 1.15 Final  "     Iron sat 41%; ferritin 896  MIGUEL A positive  Hep A IgG negative  Hep B s Ag negative  Hep B s Ab < 2.0  Hep B c Ab negative  Hep C ab negative    Hemochromatosis: negative C282Y, homozygous H63D, negative S65C    Radiology  Abdominal US TIPS 1/2023  1. Visualization limited due to patient body habitus and inability to cooperate with the examination.\  2. Patent TIPS. Right and left portal veins are appropriatelyretrograde in flow towards the TIPS.    TTE 9/25/22  1. Technically difficult exam.   2. The left ventricular size is normal.  Systolic function is normal.The estimated ejection fraction is 60-65%.  Wall thickness is mildly to moderately increased.  Left ventricular segmental wall motion is grossly normal, however endocardial definition is limited.   3. Visualized limited portions of the right ventricle are normal.  4. The left atrium is severely enlarged.   5. There is likely borderline aortic valve stenosis with a peak velocity of 149.00 cm/s, mean gradient of 5 mmHg, and aortic valve area of 1.87 cm2. Dimensionless indez of 0.54  6. The proximal ascending aorta measures 3.80 cm with an index of 1.66 cm/m2.   7. There is no gross pericardial effusion.     Renal Biopsy 9/9/2022  A-C. Native Kidney biopsy:  - Focal global and focal segmental glomerular sclerosis (13% and 7%) with glomerular IgA deposition, see comment.  Interstitial hemorrhage and tubulointerstitial inflammation.  - Severe interstitial fibrosis and tubular atrophy (~80%).  - Severe arteriosclerosis.     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 In regards to the MPO positivity, necrotizing/crescentic glomular injury is not observed, and overt vasculitis is not identified. However, the presence of interstitial hemorrhage and tubulointerstitial inflammation raise the possibility of an unsampled vasculitis.    DEXA 2018: No evidence of " osteopenia/osteoporosis.     Endoscopy  EGD 9/2022: Small EV, IGV1 oozing, portal HTN gastropathy, IGV2, no gastric ulcers, normal duodenum    Colonoscopy 7/31/2020:  - Four 2 to 3 mm polyps in the descending colon, in  the transverse colon and in the ascending colon, removed with a jumbo cold forceps. Resected and retrieved.  - One 8 mm polyp in the descending colon, removed with a hot snare. Resected and retrieved. Clip (MR conditional) was placed.   Pathology: Tubular adenomas

## 2023-02-09 NOTE — PATIENT INSTRUCTIONS
- Plan for you to touch base with the anticoagulation clinic to change from apixiban to warfarin  - Plan for left heart catheterization with cardiology  - No other plan to change your medications

## 2023-02-10 ENCOUNTER — TELEPHONE (OUTPATIENT)
Dept: FAMILY MEDICINE | Facility: CLINIC | Age: 68
End: 2023-02-10
Payer: COMMERCIAL

## 2023-02-10 ENCOUNTER — DOCUMENTATION ONLY (OUTPATIENT)
Dept: ANTICOAGULATION | Facility: CLINIC | Age: 68
End: 2023-02-10

## 2023-02-10 DIAGNOSIS — Z99.2 ESRD (END STAGE RENAL DISEASE) ON DIALYSIS (H): ICD-10-CM

## 2023-02-10 DIAGNOSIS — K70.31 ALCOHOLIC CIRRHOSIS OF LIVER WITH ASCITES (H): Primary | ICD-10-CM

## 2023-02-10 DIAGNOSIS — N18.6 ESRD (END STAGE RENAL DISEASE) ON DIALYSIS (H): ICD-10-CM

## 2023-02-10 DIAGNOSIS — K72.10 END-STAGE LIVER DISEASE (H): ICD-10-CM

## 2023-02-10 NOTE — TELEPHONE ENCOUNTER
I placed order for [facility]. Can you please reach out to them to see if they can take him? If not, I will put in a care coordinator referral to look into options. Thanks

## 2023-02-10 NOTE — TELEPHONE ENCOUNTER
Orders printed and faxed to [facility]:  [phone].    Spoke with intake and they do have availability.

## 2023-02-10 NOTE — TELEPHONE ENCOUNTER
General Call      Reason for Call:  Referrall - DENIAL by [facility]    What are your questions or concerns:    [facility]  declining referral for home care services. He has high risk for hospitalizations.      Krissy Riojas [phone]    Samia Shannon on 2/10/2023 at 4:13 PM

## 2023-02-10 NOTE — TELEPHONE ENCOUNTER
Situation: Mireille, Inpatient  from [facility] following up post discharge on 1/28/23.    Background:  Patient admitted 1/23/23 for:  Alcohol cirrhosis of liver with ascites  Altered Mental Status  Hepatic encephalopathy  End stage liver disease  End stage renal disease  DVT of RLE    Patient was discharged home on 1/28/23.  Home care orders placed and accepted by [facility] [place].    Original orders placed, are no longer active/have been denied.  Per SW, ball was dropped and staff never sent over home care orders to agency.  [facility] now states, there is no room to take on patient/case file.    Assessment:  [facility] orders need to be placed, because patient has been discharged over two weeks.  Orders per SW need to come from PCP.   states insurance coverage is limited for patient.     requesting orders from PCP for:  Home care or outpatient therapy.    General orders for PT OT- for home safety     Recommendation:  Will route for PCP to review and advise.  PCP had a follow up visit s/p discharge on 2/1/23  If questions arise, Mireille, Social Work, Inpatient [facility] direct line:  [phone]      LORIN Shah  [facility]

## 2023-02-10 NOTE — PROGRESS NOTES
2/10/23 Signature received from Dr. Palmer.  LORIN Stokes Dr.,    Your patient, Damion Quinones, is under the care of [facility]. Previously referred by Dr. Bermudez.  Anticoagulation clinic needing a new referring provider due to inablility to accept orders from resident, need attending signature.     Indication(s): DVT   Goal Range: 2.0-3.0  Duration: 6/9/23    Anticoagulation clinic requires a referral from one of Damion's [facility] ambulatory provider (PCP or specialist) in order to provide anticoagulation management. The referring provider should anticipate seeing the patient on an ongoing basis, will have INRs and warfarin Rx ordered under their name per protocol, and will be point of contact for [facility] questions on patient's anticoagulation care (procedural holds, critical results, etc).     Please review and sign the pended referral order for Damion Quinones if you are willing to oversee anticoagulation care.         Thank you,  Aleksandar Estrada, RN  Anticoagulation clinic

## 2023-02-12 LAB
PLPETH BLD-MCNC: <10 NG/ML
POPETH BLD-MCNC: <10 NG/ML

## 2023-02-13 ENCOUNTER — APPOINTMENT (OUTPATIENT)
Dept: GENERAL RADIOLOGY | Facility: CLINIC | Age: 68
End: 2023-02-13
Attending: EMERGENCY MEDICINE
Payer: COMMERCIAL

## 2023-02-13 ENCOUNTER — MYC MEDICAL ADVICE (OUTPATIENT)
Dept: CARDIOLOGY | Facility: CLINIC | Age: 68
End: 2023-02-13

## 2023-02-13 ENCOUNTER — HOSPITAL ENCOUNTER (EMERGENCY)
Facility: CLINIC | Age: 68
Discharge: HOME | End: 2023-02-13
Attending: EMERGENCY MEDICINE | Admitting: EMERGENCY MEDICINE
Payer: COMMERCIAL

## 2023-02-13 ENCOUNTER — TELEPHONE (OUTPATIENT)
Dept: CARDIOLOGY | Facility: CLINIC | Age: 68
End: 2023-02-13

## 2023-02-13 ENCOUNTER — PATIENT OUTREACH (OUTPATIENT)
Dept: CARE COORDINATION | Facility: CLINIC | Age: 68
End: 2023-02-13

## 2023-02-13 ENCOUNTER — DOCUMENTATION ONLY (OUTPATIENT)
Dept: ANTICOAGULATION | Facility: CLINIC | Age: 68
End: 2023-02-13

## 2023-02-13 VITALS
HEIGHT: 68 IN | HEART RATE: 55 BPM | SYSTOLIC BLOOD PRESSURE: 115 MMHG | TEMPERATURE: 98.5 F | OXYGEN SATURATION: 99 % | BODY MASS INDEX: 32.35 KG/M2 | RESPIRATION RATE: 20 BRPM | DIASTOLIC BLOOD PRESSURE: 102 MMHG

## 2023-02-13 DIAGNOSIS — N18.5 CKD (CHRONIC KIDNEY DISEASE) STAGE 5, GFR LESS THAN 15 ML/MIN (H): ICD-10-CM

## 2023-02-13 DIAGNOSIS — R11.0 NAUSEA: ICD-10-CM

## 2023-02-13 DIAGNOSIS — Z01.810 PRE-OPERATIVE CARDIOVASCULAR EXAMINATION: ICD-10-CM

## 2023-02-13 DIAGNOSIS — I48.0 PAF (PAROXYSMAL ATRIAL FIBRILLATION) (H): ICD-10-CM

## 2023-02-13 DIAGNOSIS — E72.20 HYPERAMMONEMIA (H): ICD-10-CM

## 2023-02-13 DIAGNOSIS — E86.0 DEHYDRATION: ICD-10-CM

## 2023-02-13 DIAGNOSIS — Z76.82 ORGAN TRANSPLANT CANDIDATE: Primary | ICD-10-CM

## 2023-02-13 DIAGNOSIS — R41.82 ALTERED MENTAL STATUS, UNSPECIFIED ALTERED MENTAL STATUS TYPE: ICD-10-CM

## 2023-02-13 LAB
ALBUMIN SERPL BCG-MCNC: 2.9 G/DL (ref 3.5–5.2)
ALBUMIN UR-MCNC: 10 MG/DL
ALP SERPL-CCNC: 235 U/L (ref 40–129)
ALT SERPL W P-5'-P-CCNC: 40 U/L (ref 10–50)
AMMONIA PLAS-SCNC: 93 UMOL/L (ref 16–60)
ANION GAP SERPL CALCULATED.3IONS-SCNC: 20 MMOL/L (ref 7–15)
APPEARANCE UR: CLEAR
AST SERPL W P-5'-P-CCNC: ABNORMAL U/L
BACTERIA #/AREA URNS HPF: ABNORMAL /HPF
BASOPHILS # BLD AUTO: 0.1 10E3/UL (ref 0–0.2)
BASOPHILS NFR BLD AUTO: 1 %
BILIRUB SERPL-MCNC: 1 MG/DL
BILIRUB UR QL STRIP: NEGATIVE
BUN SERPL-MCNC: 63.1 MG/DL (ref 8–23)
CALCIUM SERPL-MCNC: 9.4 MG/DL (ref 8.8–10.2)
CHLORIDE SERPL-SCNC: 99 MMOL/L (ref 98–107)
COLOR UR AUTO: YELLOW
CREAT SERPL-MCNC: 4.87 MG/DL (ref 0.67–1.17)
DEPRECATED HCO3 PLAS-SCNC: 17 MMOL/L (ref 22–29)
EOSINOPHIL # BLD AUTO: 0.1 10E3/UL (ref 0–0.7)
EOSINOPHIL NFR BLD AUTO: 1 %
ERYTHROCYTE [DISTWIDTH] IN BLOOD BY AUTOMATED COUNT: 15.9 % (ref 10–15)
FLUAV RNA SPEC QL NAA+PROBE: NEGATIVE
FLUBV RNA RESP QL NAA+PROBE: NEGATIVE
GFR SERPL CREATININE-BSD FRML MDRD: 12 ML/MIN/1.73M2
GLUCOSE SERPL-MCNC: 110 MG/DL (ref 70–99)
GLUCOSE UR STRIP-MCNC: NEGATIVE MG/DL
HCT VFR BLD AUTO: 38.2 % (ref 40–53)
HGB BLD-MCNC: 12.3 G/DL (ref 13.3–17.7)
HGB UR QL STRIP: NEGATIVE
HYALINE CASTS: 1 /LPF
IMM GRANULOCYTES # BLD: 0.3 10E3/UL
IMM GRANULOCYTES NFR BLD: 3 %
KETONES UR STRIP-MCNC: NEGATIVE MG/DL
LACTATE SERPL-SCNC: 1.7 MMOL/L (ref 0.7–2)
LACTATE SERPL-SCNC: 2.6 MMOL/L (ref 0.7–2)
LEUKOCYTE ESTERASE UR QL STRIP: NEGATIVE
LYMPHOCYTES # BLD AUTO: 1 10E3/UL (ref 0.8–5.3)
LYMPHOCYTES NFR BLD AUTO: 10 %
MCH RBC QN AUTO: 29.1 PG (ref 26.5–33)
MCHC RBC AUTO-ENTMCNC: 32.2 G/DL (ref 31.5–36.5)
MCV RBC AUTO: 91 FL (ref 78–100)
MONOCYTES # BLD AUTO: 1.4 10E3/UL (ref 0–1.3)
MONOCYTES NFR BLD AUTO: 13 %
MUCOUS THREADS #/AREA URNS LPF: PRESENT /LPF
NEUTROPHILS # BLD AUTO: 7.6 10E3/UL (ref 1.6–8.3)
NEUTROPHILS NFR BLD AUTO: 72 %
NITRATE UR QL: NEGATIVE
NRBC # BLD AUTO: 0 10E3/UL
NRBC BLD AUTO-RTO: 0 /100
PH UR STRIP: 5 [PH] (ref 5–7)
PLATELET # BLD AUTO: 174 10E3/UL (ref 150–450)
POTASSIUM SERPL-SCNC: 4.1 MMOL/L (ref 3.4–5.3)
PROT SERPL-MCNC: 5.8 G/DL (ref 6.4–8.3)
RBC # BLD AUTO: 4.22 10E6/UL (ref 4.4–5.9)
RBC URINE: 1 /HPF
RSV RNA SPEC NAA+PROBE: NEGATIVE
SARS-COV-2 RNA RESP QL NAA+PROBE: NEGATIVE
SODIUM SERPL-SCNC: 136 MMOL/L (ref 136–145)
SP GR UR STRIP: 1.01 (ref 1–1.03)
UROBILINOGEN UR STRIP-MCNC: NORMAL MG/DL
WBC # BLD AUTO: 10.5 10E3/UL (ref 4–11)
WBC URINE: 2 /HPF

## 2023-02-13 PROCEDURE — 71046 X-RAY EXAM CHEST 2 VIEWS: CPT

## 2023-02-13 PROCEDURE — 84155 ASSAY OF PROTEIN SERUM: CPT | Performed by: EMERGENCY MEDICINE

## 2023-02-13 PROCEDURE — 99284 EMERGENCY DEPT VISIT MOD MDM: CPT | Mod: CS,25 | Performed by: EMERGENCY MEDICINE

## 2023-02-13 PROCEDURE — 258N000003 HC RX IP 258 OP 636: Performed by: EMERGENCY MEDICINE

## 2023-02-13 PROCEDURE — 83605 ASSAY OF LACTIC ACID: CPT | Performed by: EMERGENCY MEDICINE

## 2023-02-13 PROCEDURE — 71046 X-RAY EXAM CHEST 2 VIEWS: CPT | Mod: 26 | Performed by: RADIOLOGY

## 2023-02-13 PROCEDURE — 250N000011 HC RX IP 250 OP 636: Performed by: EMERGENCY MEDICINE

## 2023-02-13 PROCEDURE — 99285 EMERGENCY DEPT VISIT HI MDM: CPT | Mod: CS | Performed by: EMERGENCY MEDICINE

## 2023-02-13 PROCEDURE — 36415 COLL VENOUS BLD VENIPUNCTURE: CPT | Performed by: EMERGENCY MEDICINE

## 2023-02-13 PROCEDURE — 87040 BLOOD CULTURE FOR BACTERIA: CPT | Performed by: EMERGENCY MEDICINE

## 2023-02-13 PROCEDURE — 82140 ASSAY OF AMMONIA: CPT | Performed by: EMERGENCY MEDICINE

## 2023-02-13 PROCEDURE — 96374 THER/PROPH/DIAG INJ IV PUSH: CPT | Performed by: EMERGENCY MEDICINE

## 2023-02-13 PROCEDURE — C9803 HOPD COVID-19 SPEC COLLECT: HCPCS | Performed by: EMERGENCY MEDICINE

## 2023-02-13 PROCEDURE — 81001 URINALYSIS AUTO W/SCOPE: CPT | Performed by: EMERGENCY MEDICINE

## 2023-02-13 PROCEDURE — 85025 COMPLETE CBC W/AUTO DIFF WBC: CPT | Performed by: EMERGENCY MEDICINE

## 2023-02-13 PROCEDURE — 87637 SARSCOV2&INF A&B&RSV AMP PRB: CPT | Performed by: EMERGENCY MEDICINE

## 2023-02-13 PROCEDURE — 96361 HYDRATE IV INFUSION ADD-ON: CPT | Performed by: EMERGENCY MEDICINE

## 2023-02-13 RX ORDER — ONDANSETRON 4 MG/1
4 TABLET, ORALLY DISINTEGRATING ORAL EVERY 8 HOURS PRN
Qty: 10 TABLET | Refills: 0 | Status: SHIPPED | OUTPATIENT
Start: 2023-02-13 | End: 2023-02-17

## 2023-02-13 RX ORDER — POTASSIUM CHLORIDE 1500 MG/1
40 TABLET, EXTENDED RELEASE ORAL
Status: CANCELLED | OUTPATIENT
Start: 2023-02-13

## 2023-02-13 RX ORDER — SODIUM CHLORIDE 9 MG/ML
INJECTION, SOLUTION INTRAVENOUS CONTINUOUS
Status: CANCELLED | OUTPATIENT
Start: 2023-02-13

## 2023-02-13 RX ORDER — ASPIRIN 325 MG
325 TABLET ORAL ONCE
Status: CANCELLED | OUTPATIENT
Start: 2023-02-13 | End: 2023-02-13

## 2023-02-13 RX ORDER — ASPIRIN 81 MG/1
243 TABLET, CHEWABLE ORAL ONCE
Status: CANCELLED | OUTPATIENT
Start: 2023-02-13

## 2023-02-13 RX ORDER — ONDANSETRON 2 MG/ML
4 INJECTION INTRAMUSCULAR; INTRAVENOUS ONCE
Status: COMPLETED | OUTPATIENT
Start: 2023-02-13 | End: 2023-02-13

## 2023-02-13 RX ORDER — LIDOCAINE 40 MG/G
CREAM TOPICAL
Status: CANCELLED | OUTPATIENT
Start: 2023-02-13

## 2023-02-13 RX ORDER — POTASSIUM CHLORIDE 1500 MG/1
20 TABLET, EXTENDED RELEASE ORAL
Status: CANCELLED | OUTPATIENT
Start: 2023-02-13

## 2023-02-13 RX ADMIN — SODIUM CHLORIDE 500 ML: 9 INJECTION, SOLUTION INTRAVENOUS at 09:03

## 2023-02-13 RX ADMIN — ONDANSETRON HYDROCHLORIDE 4 MG: 2 INJECTION, SOLUTION INTRAMUSCULAR; INTRAVENOUS at 10:05

## 2023-02-13 ASSESSMENT — ACTIVITIES OF DAILY LIVING (ADL)
ADLS_ACUITY_SCORE: 35
DEPENDENT_IADLS:: INDEPENDENT
ADLS_ACUITY_SCORE: 35
ADLS_ACUITY_SCORE: 35

## 2023-02-13 NOTE — DISCHARGE INSTRUCTIONS
Take the zofran as prescribed as needed for nausea. Follow up at your scheduled appointment for dialysis at [facility] at 2:15pm. Follow up with your primary care physician and nephrologist at your next scheduled appointments.

## 2023-02-13 NOTE — ED TRIAGE NOTES
"Pt brought in by wife directly from dialysis. Per wife report, pt has had increase of confusion, weakness, loss of appetite, \"saying things that didn't make sense\".  LKW 0000 today.   Pt baseline a/ox4, independent with ADL's, ambulates independently.   Pt unable to verbalize complaints but is able to confirm who he is and where is but unable to state time. Pt able to answer yes and no questions easily. Pt denies cp, sob, or any pain at this time.      Triage Assessment     Row Name 02/13/23 0634       Triage Assessment (Adult)    Airway WDL WDL       Respiratory WDL    Respiratory WDL WDL       Skin Circulation/Temperature WDL    Skin Circulation/Temperature WDL WDL       Cardiac WDL    Cardiac WDL WDL       Peripheral/Neurovascular WDL    Peripheral Neurovascular WDL WDL       Cognitive/Neuro/Behavioral WDL    Cognitive/Neuro/Behavioral WDL X;level of consciousness    Level of Consciousness confused    Orientation person;place;situation    Speech other (see comments)  difficulty speaking       [place] Coma Scale    Best Eye Response 4-->(E4) spontaneous    Best Motor Response 6-->(M6) obeys commands    Best Verbal Response 5-->(V5) oriented    [place] Coma Scale Score 15              "

## 2023-02-13 NOTE — PROGRESS NOTES
Got call from the ED .According to them he was sent form [facility]  because of altered mental status.But when the ED  he is not encephalopathic and there is no plan for admission. He is due for dialysis today. On labs there is no urgent need for dialysis. I talk to [facility] David at [place] at [phone] and they report they can do dialysis on him today at 2:15 pm.The message was also relayed to Dr. Sargent

## 2023-02-13 NOTE — TELEPHONE ENCOUNTER
Left vm stating that I sent an important mychart message regarding coronary angiogram, requested he log in, read and reply.  
no

## 2023-02-13 NOTE — PROGRESS NOTES
Clinic Care Coordination Contact    Pt discharged from ED.    Home care orders previously declined by [facility] (no capacity) and [facility] (not [facility] for [facility] care).     Home care orders sent to [facility] via [employer]. Received response back that they are not contracted with pt's health plan.     Home care orders resent to [facility] with info that pt presented to ED today but was not admitted, as they told clinic he was not accepted to their care due to risk for hospitalization.     JOSELIN Phoenix   Social Work Primary Care Clinic Care Coordinator   [facility]  Covering for [facility]

## 2023-02-13 NOTE — PROGRESS NOTES
Clinic Care Coordination Contact    Care Coordination referral received - needs home care agency. Home care orders in [facility] dated 2/10/23 for RN/PT. Pt was declined by [facility] and [facility].     Pt is currently in ED.     [facility] will postpone home care search until it is clear if pt is admitted or discharged.     JOSELIN Phoenix   Social Work Primary Care Clinic Care Coordinator   [facility]  Covering for [facility]

## 2023-02-13 NOTE — PROGRESS NOTES
Assessment and Plan:  1. Liver/kidney transplant evaluation - patient is a good candidate overall. Benefits and surgical risks of a liver transplantation were discussed.  2.  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 Portal Vein:Patent  2. Hepatic Artery: Open  3. TIPS: present  4. Previous Abdominal Surgery: Yes- open appendectomy, umbilical/inguinal hernia repairs  5. Hepatocellular Carcinoma: None  6. Ascites: None  7. Costal Angle: narrow  8. Portopulmonary Hypertension: absent  9. Hepatopulmonary Syndrome: absent  10. Cardiac Evaluation: needs heart catheterization  11. Nutritional Status: Moderate  12. Diabetes: no  13.Hypertension no  14. Smoker:no  14: Fraility index:no  15. Meets guidelines to receive Living Donor  No  16. Potential Living donors No    Recommendations: has recently started HD and volume status is improved, he looks clinically better than when I saw him in Nov 2022, needs complete cardiac evaluation, BMI 34, would need SLK.  Recent DVT on eliquis, MELD dependent on dialysis and anticoagulation      Patients overall evaluation will be discussed at the Liver Transplant selection committee meeting with a final recommendation on the patients suitability for transplant to be made at that time.    Consult Full  Details:  Damion Quinones was seen in consultation at the request of Dr. Poole/Christofer/Shae for evaluation as a potential SLK transplant recipient.    Reason for Visit:  Damion Quinones is a 67 year old year old male with hemochromatosis/EtOH, who presents for SLK transplant evaluation.    HPI:   Presenting complaint: kidney failure      68 y/o male with cirrhosis secondary to hemochromatosis/EtOH and ESRD who presents for transplant evaluation.  First diagnosed with liver disease when he presented with a sig upper GI bleed in 2016, ultimately discharged but subsequently re-presented in Sept of this year with another sig GI bleed requiring emergent  TIPS.  Over the course of the past 6 months has had declining renal function, native renal biopsy showed ? 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  He also has history of psoriasis/psoriatic arthritis/RA for which he had been taking Humira then transitioned to to prednisone and a new biologic. Recently started HD and volume status is much improved.  He states hs is more active physically including stationary bike and walking around the supermarket when out with is wife.    . Recent DVT diagnosis on Eliquis, MELD is up to 24 with dialysis initiation and would be dependent on anticoagulation as wel.  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 Liver dz/Colon CA  3 bothers- all - lung CA- smoker, heart dz, ? Kidney dz    Past Medical History:   Diagnosis Date     Alcoholic cirrhosis of liver with ascites (H) 10/11/2019     Arthritis     RA     C. difficile colitis      Chronic kidney disease      Coronary artery disease     AFib 2016     Diabetes mellitus type 2, uncomplicated (H) 10/11/2019     History of blood transfusion     2016     History of hemochromatosis 10/11/2019     Hypertension      Hypokalemia      Obesity      PAF (paroxysmal atrial fibrillation) (H)      Psoriatic arthritis (H)      Past Surgical History:   Procedure Laterality Date     APPENDECTOMY      Removed at 18 Years Old      COLONOSCOPY       at [facility]      EYE SURGERY Bilateral     Cataract     HERNIA REPAIR      History of bilateral  inguinal hernia repair: 10/28/2014. Open hernia repair: 10/2017. Abdominal wound exploration and debridement 12/27/2017     INSERT SHUNT PORTAL TRANSJUGULAR INTRAHEPTIC  09/26/2022     IR CVC TUNNEL PLACEMENT > 5 YRS OF AGE  01/26/2023     Past Surgical History:   Procedure Laterality Date     APPENDECTOMY      Removed at 18 Years Old      COLONOSCOPY      2014 at [facility].      EYE SURGERY Bilateral     Cataract     HERNIA REPAIR      History of bilateral inguinal hernia repair: 10/28/2014. Open hernia repair: 10/2017. Abdominal wound exploration and debridement 12/27/2017     INSERT SHUNT PORTAL TRANSJUGULAR INTRAHEPTIC  09/26/2022     IR CVC TUNNEL PLACEMENT > 5 YRS OF AGE  01/26/2023     Family History   Problem Relation Age of Onset     Lung Cancer Mother      Colon Cancer Father      Lung Cancer Brother      Heart Failure Brother      Kidney Disease Brother      No Known Allergies  Prior to Admission medications    Medication Sig Start Date End Date Taking? Authorizing Provider   apixaban ANTICOAGULANT (ELIQUIS) 5 MG tablet Take 1 tablet (5 mg) by mouth 2 times daily 2/1/23  Yes Jared Sanderson MD   bumetanide (BUMEX) 1 MG tablet 2 mg 2 times daily 10/22/22  Yes Reported, Patient   Calcium Carbonate-Vitamin D 500-3.125 MG-MCG TABS Take 1 tablet by mouth 2 times daily 9/21/22  Yes Reported, Patient   diltiazem ER (DILT-XR) 120 MG 24 hr capsule Take 1 capsule (120 mg) by mouth daily 2/8/23  Yes Gary Serrano MD   folic acid (FOLVITE) 1 MG tablet TAKE FIVE TABLETS BY MOUTH EVERY DAY 9/19/22  Yes Reported, Patient   lactulose (CHRONULAC) 10 GM/15ML solution Take 20 g by mouth 3 times daily 11/19/22  Yes Reported, Patient   multivitamin w/minerals (THERA-VIT-M) tablet Take 1 tablet by mouth daily   Yes Reported, Patient   omeprazole 20 MG tablet Take 40 mg by mouth 2 times daily   Yes Unknown, Entered By History   predniSONE (DELTASONE) 5 MG tablet Take 5 mg by mouth 11/8/22  Yes Reported, Patient  "  spironolactone (ALDACTONE) 50 MG tablet Take 25 mg by mouth daily   Yes Reported, Patient   XIFAXAN 550 MG TABS tablet Take 550 mg by mouth 2 times daily 10/31/22  Yes Reported, Patient   zinc sulfate (ZINCATE) 220 (50 Zn) MG capsule Take 1 capsule (220 mg) by mouth daily 2/8/23  Yes Gary Serrano MD   zolpidem (AMBIEN) 10 MG tablet Take 1 tablet (10 mg) by mouth nightly as needed for sleep 2/6/23  Yes Alba Knott APRN CNP       Previous Transplant Hx: No    Cardiovascular Hx:       h/o Cardiac Issues: No       Exercise Tolerance: no chest pain or shortness of breath with exertion.    Potential Donor(s): No    ROS:    REVIEW OF SYSTEMS (check box if normal)  [x]                GENERAL  [x]                  PULMONARY [x]                 GENITOURINARY  [x]                 CNS                 [x]                  CARDIAC  [x]                  ENDOCRINE  [x]                 EARS,NOSE,THROAT [x]                  GASTROINTESTINAL [x]                  NEUROLOGIC    [x]                 MUSCLOSKELTAL  [x]                   HEMATOLOGY    Examination:     Vitals:  /80   Pulse 99   Ht 1.682 m (5' 6.22\")   Wt 96.5 kg (212 lb 11.9 oz)   BMI 34.11 kg/m      GENERAL APPEARANCE: alert and no distress  EYES: PERRL  HENT: mouth without ulcers or lesions  NECK: supple, no adenopathy  RESP: lungs clear to auscultation - no rales, rhonchi or wheezes  CV: regular rhythm, normal rate, no rub   ABDOMEN:  soft, nontender, no HSM or masses and bowel sounds normal  MS: extremities normal- no gross deformities noted, no evidence of inflammation in joints, no muscle tenderness  SKIN: no rash  NEURO: Normal strength and tone, sensory exam grossly normal, mentation intact and speech normal  PSYCH: mentation appears normal. and affect normal/bright      Results:   Recent Results (from the past 168 hour(s))   Basic metabolic panel    Collection Time: 02/09/23  9:30 AM   Result Value Ref Range    Sodium 135 (L) 136 - " 145 mmol/L    Potassium 4.1 3.4 - 5.3 mmol/L    Chloride 101 98 - 107 mmol/L    Carbon Dioxide (CO2) 23 22 - 29 mmol/L    Anion Gap 11 7 - 15 mmol/L    Urea Nitrogen 42.4 (H) 8.0 - 23.0 mg/dL    Creatinine 4.02 (H) 0.67 - 1.17 mg/dL    Calcium 8.8 8.8 - 10.2 mg/dL    Glucose 189 (H) 70 - 99 mg/dL    GFR Estimate 16 (L) >60 mL/min/1.73m2   Hepatic panel    Collection Time: 02/09/23  9:30 AM   Result Value Ref Range    Protein Total 5.2 (L) 6.4 - 8.3 g/dL    Albumin 2.7 (L) 3.5 - 5.2 g/dL    Bilirubin Total 0.6 <=1.2 mg/dL    Alkaline Phosphatase 228 (H) 40 - 129 U/L    AST 43 10 - 50 U/L    ALT 35 10 - 50 U/L    Bilirubin Direct 0.28 0.00 - 0.30 mg/dL   Phosphatidylethanol (PEth), Whole Blood    Collection Time: 02/09/23  9:30 AM   Result Value Ref Range    PEth 16:0/18:1 (POPEth) <10 ng/mL    PEth 16:0/18:2 (PLPEth) <10 ng/mL   INR    Collection Time: 02/09/23  9:30 AM   Result Value Ref Range    INR 1.34 (H) 0.85 - 1.15   CBC with platelets    Collection Time: 02/09/23  9:30 AM   Result Value Ref Range    WBC Count 9.0 4.0 - 11.0 10e3/uL    RBC Count 3.73 (L) 4.40 - 5.90 10e6/uL    Hemoglobin 11.0 (L) 13.3 - 17.7 g/dL    Hematocrit 34.2 (L) 40.0 - 53.0 %    MCV 92 78 - 100 fL    MCH 29.5 26.5 - 33.0 pg    MCHC 32.2 31.5 - 36.5 g/dL    RDW 15.7 (H) 10.0 - 15.0 %    Platelet Count 153 150 - 450 10e3/uL     I had a long discussion with the patient regarding liver transplantation which included but was not limited to  the following points:    1. Liver transplant selection committee process.  2. The federal rules for cadaveric waiting list, the size and blood type matching of the organ. The availability of living-related donor transplantation.  3. The types of donors: brain death donors, non-heart beating donors, partial liver grafts: splits and living donor grafts  4. Extended criteria  Donors (older age, steasosis) and the increased  risk of primary non-function using the extended criteria donors  5. The CDC high risk  donors,  Risk of donor transmitted infections and donor transmitted malignancy  6. The liver transplant operation and the associated risks and technical complications which can include intraoperative death, post operative death,  Primary non-function, bleeding requiring re-operations, arterial and biliary complications, bowel perforations, and intra abdominal abscess. Some of these complicaitons may require a second operation.  7. The postoperative course, the ICU stay and risk of postoperative complications which can include sepsis, MI, stroke, brain injury, pneumonia, pleural effusions, and renal dysfunction.  8. The current 1 year and 5 year graft and patient survivals.  9. The need for life long immunosuppressive therapy and the side effects of these medications, including the possibility of toxicity, opportunistic infections, risk of cancer including lymphoma, and the possibility of rejection even if the patient is taking the medication exactly as prescribed.  10. The need for compliance with medications and follow-up visits in the clinic and thereafter.  11. The patient and family understand these risks and wish to proceed to transplantation

## 2023-02-13 NOTE — PROGRESS NOTES
ANTICOAGULATION  MANAGEMENT: Discharge Review    Damion Quinones chart reviewed for anticoagulation continuity of care    Emergency room visit on 2/13/23 for Nausea, Dehydration.    Discharge disposition: Home    Results:    Recent labs: (last 7 days)     02/09/23  0930   INR 1.34*     Anticoagulation inpatient management:     Patient has not been transitioned to Warfarin at time of encounter.  See My Chart message sent to patient by HUNTER Quiroz on 2/13/23.         PLAN     Meenu Saha RN Aby, Elizabeth Stanton Teter, MD; Darlene Lares RPH; Jose Poole MD;  Anticoag Clinic; Davin Quiroz LPN 1 hour ago (1:16 PM)     CS  Yes, Davin has reached out to patient to get scheduled under Dr. Bennett's orders.       Is someone ordering the medication change to warfarin and reaching out to the patient?     I am copying Davin here so he is looped in on this since he is coordinating scheduling. Thanks.       Lolis Bermudez MD Draz, Kristen, RPH; Meenu Saha RN; Jose Poole MD;  Anticoag Clinic 4 hours ago (10:05 AM)     EA  I am not sure what they plan to do prior to his heart catheterization. It is not scheduled yet, so I do not have a cardiologist to reach out to.     Marysol - when the [facility] is scheduled would you mind letting the anticoagulation team know who the cardiologist is and their plan for anticoagulation please? Thanks.       Darlene Lares RPH Aby, Elizabeth Stanton Teter, MD; Meenu Saha RN; Jose Poole MD;  Anticoag Clinic 3 days ago     KD  That seems reasonable and being > 30 days certainly does reduce thrombotic risk if we have an incomplete overlap (INR drops low if we use Eliquis as the overlap).     Are you anticipating warfarin holds with the upcoming procedures or just done at INR on low end of range?  (I know our LVADs are no longer interrupting for most heart caths but some other patients do)     KATTY Rashid Elizabeth Stanton  MD Luda Adrian Kristen, [facility]; Meenu Saha RN; Jose Poole MD;  Antico Clinic 3 days ago     EA  Thank you for your message.     Would it be possible to transition on 2/23/2023 - 30 days after his diagnosis? This will allow us to get the procedures we want done in the future (left heart catheterization), but be ready for liver transplantation following C.     Thanks,   Darlene Dunn, [facility]  Lolis Bermudez MD; Jose Poole MD; [facility]; Darlene Lares [facility] 4 days ago     KD  Sandra Bermudez and Virgilio,     We received the referral to start Damion on warfarin for future transplant listing. Wanted to connect with you on a transition plan in the setting of very recent clot and ESRD which eliminates Lovenox bridge for transition.  Please advise if you'd like us to overlap with heparin or eliquis (if possible to arrange) and/or if transition can be deferred until 30 or 90 days post clot to reduce thrombotic risk at time of transition.     Appreciate your help and insight.     Thank you,   Darlene Lares, [facility]   [phone]          Aleksandar Estrada, RN

## 2023-02-13 NOTE — ED PROVIDER NOTES
ED Provider Note  [facility]      History     Chief Complaint   Patient presents with     Altered Mental Status     HPI  Damion Quinones is a 67 year old male who was transferred from his dialysis unit in [place] to the ED here for altered mental status and low BP. He does not seem to be altered upon evaluation here and his BP is normal at 121/84. He denies any complaints at this time and states he does not want to be here. He specifically denies any F/C/CP/SOB/abd pain/N/V/dairrhea. He does admit that he hasn't had an appetite lately and hasn't been eating well.     Past Medical History  Past Medical History:   Diagnosis Date     Alcoholic cirrhosis of liver with ascites (H) 10/11/2019     Arthritis     RA     C. difficile colitis      Chronic kidney disease      Coronary artery disease     AFib 2016     Diabetes mellitus type 2, uncomplicated (H) 10/11/2019     History of blood transfusion     2016     History of hemochromatosis 10/11/2019     Hypertension      Hypokalemia      Obesity      PAF (paroxysmal atrial fibrillation) (H)      Psoriatic arthritis (H)      Past Surgical History:   Procedure Laterality Date     APPENDECTOMY      Removed at 18 Years Old      COLONOSCOPY      2014 at [facility]      EYE SURGERY Bilateral     Cataract     HERNIA REPAIR      History of bilateral inguinal hernia repair: 10/28/2014. Open hernia repair: 10/2017. Abdominal wound exploration and debridement 12/27/2017     INSERT SHUNT PORTAL TRANSJUGULAR INTRAHEPTIC  09/26/2022     IR CVC TUNNEL PLACEMENT > 5 YRS OF AGE  01/26/2023     ondansetron (ZOFRAN ODT) 4 MG ODT tab  apixaban ANTICOAGULANT (ELIQUIS) 5 MG tablet  bumetanide (BUMEX) 1 MG tablet  Calcium Carbonate-Vitamin D 500-3.125 MG-MCG TABS  diltiazem ER (DILT-XR) 120 MG 24 hr capsule  folic acid (FOLVITE) 1 MG tablet  lactulose (CHRONULAC) 10 GM/15ML solution  multivitamin w/minerals (THERA-VIT-M) tablet  omeprazole 20 MG tablet  predniSONE  "(DELTASONE) 5 MG tablet  spironolactone (ALDACTONE) 50 MG tablet  XIFAXAN 550 MG TABS tablet  zinc sulfate (ZINCATE) 220 (50 Zn) MG capsule  zolpidem (AMBIEN) 10 MG tablet      No Known Allergies  Family History  Family History   Problem Relation Age of Onset     Lung Cancer Mother      Colon Cancer Father      Lung Cancer Brother      Heart Failure Brother      Kidney Disease Brother      Social History   Social History     Tobacco Use     Smoking status: Never     Smokeless tobacco: Never   Substance Use Topics     Alcohol use: Not Currently     Comment: Last ETOH use was 12/31/2021     Drug use: Not Currently      Past medical history, past surgical history, medications, allergies, family history, and social history were reviewed with the patient. No additional pertinent items.      A medically appropriate review of systems was performed with pertinent positives and negatives noted in the HPI, and all other systems negative.    Physical Exam   BP: 121/84  Pulse: 96  Temp: 98.5  F (36.9  C)  Resp: 18  Height: 172.7 cm (5' 8\")  SpO2: 99 %  Physical Exam  Vitals and nursing note reviewed.   Constitutional:       General: He is not in acute distress.     Appearance: He is well-developed. He is not toxic-appearing.   HENT:      Head: Normocephalic and atraumatic.   Eyes:      General: No scleral icterus.  Cardiovascular:      Rate and Rhythm: Normal rate and regular rhythm.      Heart sounds: No murmur heard.    No friction rub. No gallop.   Pulmonary:      Effort: Pulmonary effort is normal.      Breath sounds: Normal breath sounds. No wheezing, rhonchi or rales.   Abdominal:      General: Bowel sounds are normal. There is no distension.      Palpations: Abdomen is soft.      Tenderness: There is no abdominal tenderness. There is no guarding.   Musculoskeletal:         General: Normal range of motion.      Cervical back: Normal range of motion and neck supple.   Skin:     General: Skin is warm and dry. "   Neurological:      Mental Status: He is alert and oriented to person, place, and time.      Cranial Nerves: No dysarthria.      Sensory: No sensory deficit.      Motor: No weakness.   Psychiatric:         Mood and Affect: Mood normal.         Behavior: Behavior normal.           ED Course, Procedures, & Data      Procedures          The Lactic acid level is elevated due to dehydration, at this time there is no sign of severe sepsis or septic shock.       Results for orders placed or performed during the hospital encounter of 02/13/23   XR Chest 2 Views     Status: None    Narrative    Exam: XR CHEST 2 VIEWS, 2/13/2023 8:51 AM    Comparison: Chest x-ray 11/22/2022    History: elevated lactate, r/o CAP    Findings:  PA and lateral views of the chest. Left IJ CVC tip projects over the  superior cavoatrial junction. Trachea is midline. Stable cardiac  silhouette. No focal airspace opacification. No pneumothorax or  pleural effusion. Embolism coils in the left upper quadrant.  Visualized upper abdomen is otherwise unremarkable. No acute osseous  abnormality. Osteopenia. Diffuse idiopathic skeletal hyperostosis in  the thoracic spine.      Impression    Impression: No focal airspace disease. Chest x-ray appears grossly  unchanged compared to 11/22/2022.    I have personally reviewed the examination and initial interpretation  and I agree with the findings.    KEV CHEN MD         SYSTEM ID:  [id number]   Comprehensive metabolic panel     Status: Abnormal   Result Value Ref Range    Sodium 136 136 - 145 mmol/L    Potassium 4.1 3.4 - 5.3 mmol/L    Chloride 99 98 - 107 mmol/L    Carbon Dioxide (CO2) 17 (L) 22 - 29 mmol/L    Anion Gap 20 (H) 7 - 15 mmol/L    Urea Nitrogen 63.1 (H) 8.0 - 23.0 mg/dL    Creatinine 4.87 (H) 0.67 - 1.17 mg/dL    Calcium 9.4 8.8 - 10.2 mg/dL    Glucose 110 (H) 70 - 99 mg/dL    Alkaline Phosphatase 235 (H) 40 - 129 U/L    AST      ALT 40 10 - 50 U/L    Protein Total 5.8 (L) 6.4 - 8.3 g/dL     Albumin 2.9 (L) 3.5 - 5.2 g/dL    Bilirubin Total 1.0 <=1.2 mg/dL    GFR Estimate 12 (L) >60 mL/min/1.73m2   Lactic acid whole blood     Status: Abnormal   Result Value Ref Range    Lactic Acid 2.6 (H) 0.7 - 2.0 mmol/L   UA with Microscopic     Status: Abnormal   Result Value Ref Range    Color Urine Yellow Colorless, Straw, Light Yellow, Yellow    Appearance Urine Clear Clear    Glucose Urine Negative Negative mg/dL    Bilirubin Urine Negative Negative    Ketones Urine Negative Negative mg/dL    Specific Gravity Urine 1.014 1.003 - 1.035    Blood Urine Negative Negative    pH Urine 5.0 5.0 - 7.0    Protein Albumin Urine 10 (A) Negative mg/dL    Urobilinogen Urine Normal Normal, 2.0 mg/dL    Nitrite Urine Negative Negative    Leukocyte Esterase Urine Negative Negative    Bacteria Urine Few (A) None Seen /HPF    Mucus Urine Present (A) None Seen /LPF    RBC Urine 1 <=2 /HPF    WBC Urine 2 <=5 /HPF    Hyaline Casts Urine 1 <=2 /LPF   Symptomatic Influenza A/B & SARS-CoV2 (COVID-19) Virus PCR Multiplex Nose     Status: Normal    Specimen: Nose; Swab   Result Value Ref Range    Influenza A PCR Negative Negative    Influenza B PCR Negative Negative    RSV PCR Negative Negative    SARS CoV2 PCR Negative Negative    Narrative    Testing was performed using the Xpert Xpress CoV2/Flu/RSV Assay on the Cepheid GeneXpert Instrument. This test should be ordered for the detection of SARS-CoV-2 and influenza viruses in individuals who meet clinical and/or epidemiological criteria. Test performance is unknown in asymptomatic patients. This test is for in vitro diagnostic use under the FDA EUA for laboratories certified under CLIA to perform high or moderate complexity testing. This test has not been FDA cleared or approved. A negative result does not rule out the presence of PCR inhibitors in the specimen or target RNA in concentration below the limit of detection for the assay. If only one viral target is positive but  coinfection with multiple targets is suspected, the sample should be re-tested with another FDA cleared, approved, or authorized test, if coinfection would change clinical management. This test was validated by the [facility]. These laboratories are certified under the Clinical Laboratory Improvement Amendments of 1988 (CLIA-88) as qualified to perform high complexity laboratory testing.   CBC with platelets and differential     Status: Abnormal   Result Value Ref Range    WBC Count 10.5 4.0 - 11.0 10e3/uL    RBC Count 4.22 (L) 4.40 - 5.90 10e6/uL    Hemoglobin 12.3 (L) 13.3 - 17.7 g/dL    Hematocrit 38.2 (L) 40.0 - 53.0 %    MCV 91 78 - 100 fL    MCH 29.1 26.5 - 33.0 pg    MCHC 32.2 31.5 - 36.5 g/dL    RDW 15.9 (H) 10.0 - 15.0 %    Platelet Count 174 150 - 450 10e3/uL    % Neutrophils 72 %    % Lymphocytes 10 %    % Monocytes 13 %    % Eosinophils 1 %    % Basophils 1 %    % Immature Granulocytes 3 %    NRBCs per 100 WBC 0 <1 /100    Absolute Neutrophils 7.6 1.6 - 8.3 10e3/uL    Absolute Lymphocytes 1.0 0.8 - 5.3 10e3/uL    Absolute Monocytes 1.4 (H) 0.0 - 1.3 10e3/uL    Absolute Eosinophils 0.1 0.0 - 0.7 10e3/uL    Absolute Basophils 0.1 0.0 - 0.2 10e3/uL    Absolute Immature Granulocytes 0.3 <=0.4 10e3/uL    Absolute NRBCs 0.0 10e3/uL   Ammonia (on ice)     Status: Abnormal   Result Value Ref Range    Ammonia 93 (H) 16 - 60 umol/L   Lactic acid whole blood     Status: Normal   Result Value Ref Range    Lactic Acid 1.7 0.7 - 2.0 mmol/L   CBC with platelets differential     Status: Abnormal    Narrative    The following orders were created for panel order CBC with platelets differential.  Procedure                               Abnormality         Status                     ---------                               -----------         ------                     CBC with platelets and d...[[id number]]  Abnormal            Final result                 Please view results for these tests on the  individual orders.     Medications   0.9% sodium chloride BOLUS (0 mLs Intravenous Stopped 2/13/23 1007)   ondansetron (ZOFRAN) injection 4 mg (4 mg Intravenous Given 2/13/23 1005)     Labs Ordered and Resulted from Time of ED Arrival to Time of ED Departure   COMPREHENSIVE METABOLIC PANEL - Abnormal       Result Value    Sodium 136      Potassium 4.1      Chloride 99      Carbon Dioxide (CO2) 17 (*)     Anion Gap 20 (*)     Urea Nitrogen 63.1 (*)     Creatinine 4.87 (*)     Calcium 9.4      Glucose 110 (*)     Alkaline Phosphatase 235 (*)     AST        ALT 40      Protein Total 5.8 (*)     Albumin 2.9 (*)     Bilirubin Total 1.0      GFR Estimate 12 (*)    LACTIC ACID WHOLE BLOOD - Abnormal    Lactic Acid 2.6 (*)    ROUTINE UA WITH MICROSCOPIC - Abnormal    Color Urine Yellow      Appearance Urine Clear      Glucose Urine Negative      Bilirubin Urine Negative      Ketones Urine Negative      Specific Gravity Urine 1.014      Blood Urine Negative      pH Urine 5.0      Protein Albumin Urine 10 (*)     Urobilinogen Urine Normal      Nitrite Urine Negative      Leukocyte Esterase Urine Negative      Bacteria Urine Few (*)     Mucus Urine Present (*)     RBC Urine 1      WBC Urine 2      Hyaline Casts Urine 1     CBC WITH PLATELETS AND DIFFERENTIAL - Abnormal    WBC Count 10.5      RBC Count 4.22 (*)     Hemoglobin 12.3 (*)     Hematocrit 38.2 (*)     MCV 91      MCH 29.1      MCHC 32.2      RDW 15.9 (*)     Platelet Count 174      % Neutrophils 72      % Lymphocytes 10      % Monocytes 13      % Eosinophils 1      % Basophils 1      % Immature Granulocytes 3      NRBCs per 100 WBC 0      Absolute Neutrophils 7.6      Absolute Lymphocytes 1.0      Absolute Monocytes 1.4 (*)     Absolute Eosinophils 0.1      Absolute Basophils 0.1      Absolute Immature Granulocytes 0.3      Absolute NRBCs 0.0     AMMONIA - Abnormal    Ammonia 93 (*)    INFLUENZA A/B & SARS-COV2 PCR MULTIPLEX - Normal    Influenza A PCR Negative       Influenza B PCR Negative      RSV PCR Negative      SARS CoV2 PCR Negative     LACTIC ACID WHOLE BLOOD - Normal    Lactic Acid 1.7     BLOOD CULTURE   BLOOD CULTURE     XR Chest 2 Views   Final Result   Impression: No focal airspace disease. Chest x-ray appears grossly   unchanged compared to 11/22/2022.      I have personally reviewed the examination and initial interpretation   and I agree with the findings.      KEV CHEN MD            SYSTEM ID:  [id number]             Medical Decision Making  The patient's presentation is strongly suggestive of a chronic illness mild to moderate exacerbation, progression, or side effect of treatment.    The patient's evaluation involved:  ordering and/or review of 3+ test(s) in this encounter (see separate area of note for details)  review of 3+ test result(s) ordered prior to this encounter (see separate area of note for details)    The patient's management involved prescription drug management (including medications given in the ED) and a decision regarding hospitalization.      Assessment & Plan    68 yo male here with altered mental status and elevated lactate from dialysis clinic. They were concerned that his BP was low but it is normal here and is is alert and oriented. He does have an elevated lactate but a normal WBC count. His ammonia is slightly elevated at 93, which has been higher in the past. His labs show an acidosis with an anion gap, likely secondary to dehydration. He is currently taking lactulose regularly. Per the wife and patient he has not been eating well recently and having frequent bowel movements with no blood.      I offered the patient admission to the hospital and discussed the case with nephrology. They do not think he needs emergent dialysis at this time but were able to set up an appointment at [facility] at 2:15pm. Lactate is normal after a 500cc bolus. He was able to eat a meal here and they prefer to be discharged at this  time.    I have reviewed the nursing notes. I have reviewed the findings, diagnosis, plan and need for follow up with the patient.    New Prescriptions    ONDANSETRON (ZOFRAN ODT) 4 MG ODT TAB    Take 1 tablet (4 mg) by mouth every 8 hours as needed for nausea       Final diagnoses:   Altered mental status, unspecified altered mental status type   Dehydration   Nausea   Hyperammonemia (H)       Conor Vaughan MD  [facility] EMERGENCY DEPARTMENT  2/13/2023     Conor Vaughan MD  02/13/23 114

## 2023-02-14 ENCOUNTER — TELEPHONE (OUTPATIENT)
Dept: FAMILY MEDICINE | Facility: CLINIC | Age: 68
End: 2023-02-14

## 2023-02-14 NOTE — TELEPHONE ENCOUNTER
General Call    Contacts       Type Contact Phone/Fax    02/14/2023 12:11 PM CST Phone (Incoming) Jeri @ [facility] [phone]        Reason for Call:  Orders/referral for home health care is being denied by [facility].    What are your questions or concerns:  Jeri @ [facility] called to let PCP know that they have received another order/referral for home health care for Pt but they are being declined by [facility] due to complexity of patient complex care and that it is not appropriate for Pt to receive home care and also due to Pt frequently being in the hospital.    Date of last appointment with provider: 02/01/2023    Subha Mota

## 2023-02-14 NOTE — PROGRESS NOTES
Clinic Care Coordination Contact    No response from [facility] yet.     Home care orders sent to [facility] via [employer].     Addendum 3 pm:  No response from [facility].    [facility] only taking referrals from [facility] providers/patients.     [facility] called clinic today with response: [facility] [phone] called to let PCP know that they have received another order/referral for home health care for Pt but they are being declined by [facility] due to complexity of patient complex care and that it is not appropriate for Pt to receive home care and also due to Pt frequently being in the hospital.    Routing to PCP. It appears that a home care agency has stated that patient is not appropriate for home care, nor have we received a response from other places or acceptance of patient referral.     These are all the home care agencies listed on Medicare.gov for pt's zip code.     JOSELIN [place]   Social Work Primary Care Clinic Care Coordinator   [facility]  Covering for [facility] clinic

## 2023-02-15 NOTE — PROGRESS NOTES
Clinic Care Coordination Contact     CC received call back from Concepción Shannon, [facility]. They received the home care referral for RN/PT. They are able to accept the referral. They will call pt directly. For any questions, call [phone] for Concepción.    Routing to PCP as FYI.     Closing this referral as reason for referral has been resolved.     Cindy Ann, \[facility] [place] [facility]\""   Social Work Primary Care Clinic Care Coordinator   [facility]  [phone]  [email or web address]

## 2023-02-16 ENCOUNTER — TELEPHONE (OUTPATIENT)
Dept: TRANSPLANT | Facility: CLINIC | Age: 68
End: 2023-02-16

## 2023-02-16 DIAGNOSIS — N18.6 STAGE 5 CHRONIC KIDNEY DISEASE ON CHRONIC DIALYSIS (H): ICD-10-CM

## 2023-02-16 DIAGNOSIS — Z99.2 STAGE 5 CHRONIC KIDNEY DISEASE ON CHRONIC DIALYSIS (H): ICD-10-CM

## 2023-02-16 DIAGNOSIS — A04.72 C. DIFFICILE DIARRHEA: Primary | ICD-10-CM

## 2023-02-16 DIAGNOSIS — K70.30 ALCOHOLIC CIRRHOSIS (H): ICD-10-CM

## 2023-02-16 NOTE — TELEPHONE ENCOUNTER
Spoke with Casey and spouse    Was in ER here, then went to dialysis on Monday      Ended up in [place] again after dialysis and Monday then admitted til yesterday    Has C Diff infection again, on meds (fidaxmicin, same as prescribed here when he was seen by ID.)    [facility]  Recommended seeing ID again, will discuss and order ID consult- ideally with C Diff specialist.      They will call today to get [facility] scheduled ASAP.

## 2023-02-16 NOTE — PROGRESS NOTES
Anticoagulation management    Damion W Quinones referred to transition Eliquis to warfarin for future transplant listing. Anticoagulation for acute DVT (1/23/23). Transition timing and procedural planning discussed electronically with care team including: Dr. Bermudez, Dr. Leventhal, Meenu Saha, RN, Davin Moore LPN.    Plan outline:      Dr. Bermudez confirmed okay to delay transition to Warfarin to at least 30 days post clot due to reduce thrombotic risk with transition      Also in agreement to stay on Eliquis and transition to warfarin after cath lab procedure in (anticipated early March)       Anticoagulation clinic will make for post procedure transition plan to warfarin and communicate to patient; anticipate utilizing Eliquis overlap since still within 90 days of clot and Damion received 60 day supply.    Darlene Lares, [facility]

## 2023-02-17 ENCOUNTER — OFFICE VISIT (OUTPATIENT)
Dept: FAMILY MEDICINE | Facility: CLINIC | Age: 68
End: 2023-02-17
Payer: COMMERCIAL

## 2023-02-17 ENCOUNTER — TELEPHONE (OUTPATIENT)
Dept: INFECTIOUS DISEASES | Facility: CLINIC | Age: 68
End: 2023-02-17

## 2023-02-17 VITALS
BODY MASS INDEX: 33.34 KG/M2 | TEMPERATURE: 98.1 F | DIASTOLIC BLOOD PRESSURE: 62 MMHG | SYSTOLIC BLOOD PRESSURE: 104 MMHG | HEART RATE: 88 BPM | RESPIRATION RATE: 22 BRPM | HEIGHT: 68 IN | OXYGEN SATURATION: 93 % | WEIGHT: 220 LBS

## 2023-02-17 DIAGNOSIS — N18.6 ESRD (END STAGE RENAL DISEASE) ON DIALYSIS (H): ICD-10-CM

## 2023-02-17 DIAGNOSIS — L03.113 CELLULITIS OF RIGHT UPPER EXTREMITY: ICD-10-CM

## 2023-02-17 DIAGNOSIS — Z99.2 ESRD (END STAGE RENAL DISEASE) ON DIALYSIS (H): ICD-10-CM

## 2023-02-17 DIAGNOSIS — Z09 HOSPITAL DISCHARGE FOLLOW-UP: Primary | ICD-10-CM

## 2023-02-17 DIAGNOSIS — A04.72 C. DIFFICILE COLITIS: ICD-10-CM

## 2023-02-17 DIAGNOSIS — K72.10 END-STAGE LIVER DISEASE (H): ICD-10-CM

## 2023-02-17 PROCEDURE — 99213 OFFICE O/P EST LOW 20 MIN: CPT | Performed by: PHYSICIAN ASSISTANT

## 2023-02-17 RX ORDER — FOLIC ACID/VIT B COMPLEX AND C 0.8 MG
1 TABLET ORAL
COMMUNITY
Start: 2023-02-07 | End: 2023-03-09

## 2023-02-17 RX ORDER — LACTULOSE 10 G/15ML
10-20 SOLUTION ORAL
COMMUNITY
Start: 2023-02-15 | End: 2023-03-09

## 2023-02-17 RX ORDER — CEPHALEXIN 500 MG/1
500 CAPSULE ORAL 2 TIMES DAILY
Qty: 20 CAPSULE | Refills: 0 | Status: SHIPPED | OUTPATIENT
Start: 2023-02-17 | End: 2023-02-27

## 2023-02-17 ASSESSMENT — ENCOUNTER SYMPTOMS
CONSTITUTIONAL NEGATIVE: 1
WOUND: 1
JOINT SWELLING: 1
GASTROINTESTINAL NEGATIVE: 1
RESPIRATORY NEGATIVE: 1

## 2023-02-17 NOTE — TELEPHONE ENCOUNTER
Patient's wife called - was informed to call back if she had not received a return call be 1pm today.  Please call asa.

## 2023-02-17 NOTE — PROGRESS NOTES
1. Hospital discharge follow-up  He has developed cellulitis of his hand and foot    2. ESRD (end stage renal disease) on dialysis (H)  Scheduled for transplant    3. End-stage liver disease (H)  Scheduled for transplant    4. C. difficile colitis  On fidaxomicin    5. Cellulitis of right upper extremity  Will treat with cephalexin for 10 days  He started Bumex (2mg bid) today and should continue on that to help with edema of hand and foot    - cephALEXin (KEFLEX) 500 MG capsule; Take 1 capsule (500 mg) by mouth 2 times daily for 10 days  Dispense: 20 capsule; Refill: 0      Subjective   Damion is a 67 year old accompanied by his spouse-Apoorva, presenting for the following health issues:  Hospital F/U (Pt here for a hospital Follow-up for altered mental status/sepsis at [facility] from 2/13-2/15  Pt is also c/o edema and pain of right hand and left ankle edema since hospital visit.  Pt was also Dx with C. Diff in hospital and also has skin tear on left arm)      History of Present Illness       Reason for visit:  Swelling in right hand and wrist  Symptom onset:  1-3 days ago    He eats 2-3 servings of fruits and vegetables daily.He consumes 0 sweetened beverage(s) daily.He exercises with enough effort to increase his heart rate 9 or less minutes per day.  He exercises with enough effort to increase his heart rate 3 or less days per week.   He is taking medications regularly.     He was hospitalized for sepsis and altered mental status from 2/13/ to 2/17  He was diagnosed with C diff, was discharged on fidaxomicin  He is working with ID from [facility] at the , they are waiting to hear back from them  He is preparing for kidney and liver transplant and is also working with that transplant team  He has ESRD and is on dialysis at [facility] in [place], he had dialysis this morning    He has swelling of his right hand and left ankle since 2/15 and it has become worse, the right hand has been weeping    He has a blood clot in  "right leg diagnosed 3 weeks ago and is on Eliquis    Started Bumex (2 mg bid) this morning            Review of Systems   Constitutional: Negative.    HENT: Negative.    Respiratory: Negative.    Gastrointestinal: Negative.    Musculoskeletal: Positive for joint swelling.   Skin: Positive for wound.            Objective    /62 (BP Location: Right arm, Patient Position: Sitting, Cuff Size: Adult Large)   Pulse 88   Temp 98.1  F (36.7  C) (Tympanic)   Resp 22   Ht 1.727 m (5' 8\")   Wt 99.8 kg (220 lb)   SpO2 93%   BMI 33.45 kg/m    Body mass index is 33.45 kg/m .  Physical Exam  Vitals reviewed.   Constitutional:       Appearance: Normal appearance.   Cardiovascular:      Rate and Rhythm: Normal rate and regular rhythm.      Pulses: Normal pulses.      Heart sounds: Normal heart sounds.   Pulmonary:      Effort: Pulmonary effort is normal.      Breath sounds: Normal breath sounds.   Abdominal:      Comments: Large umbilical hernia   Musculoskeletal:      Comments: Right hand is swollen and edematous, warm, tender to touch, weeping from one spot  Left ankle has +2 edema, tender, no skin breaks   Neurological:      Mental Status: He is alert.                            "

## 2023-02-17 NOTE — TELEPHONE ENCOUNTER
[facility] Call Center    Phone Message    May a detailed message be left on voicemail: yes     Reason for Call: Other: Per spouse calling wanting to schedule. Writer wasnt sure what would be best due to DX and instructions on the referral .     DX: C. difficile diarrhea [A04.72]  Stage 5 chronic kidney disease on chronic dialysis (H) [N18.6, Z99.2]  Alcoholic cirrhosis (H) [K70.30] referred by Jose Godfrey MD    Instructions on referral : Please scheduled ASAP- if possible Dr. Lanza Or Donta? Patient at end of transplant evaluation with recurrent C Diff infection.    Please call wife back to discuss. Thank you!    Action Taken: Message routed to:  [facility] ID    Travel Screening: Not Applicable

## 2023-02-18 LAB
BACTERIA BLD CULT: NO GROWTH
BACTERIA BLD CULT: NO GROWTH

## 2023-02-18 NOTE — TELEPHONE ENCOUNTER
Called pt and spoke with pt's wife. She reports apt with Dr Cage at [facility] would work ok for them on 3/1 for pt's C-diff. Apt made for pt and directions given.  Callie Apple RN

## 2023-02-20 ENCOUNTER — PRE VISIT (OUTPATIENT)
Dept: NEPHROLOGY | Facility: CLINIC | Age: 68
End: 2023-02-20

## 2023-02-21 ENCOUNTER — TELEPHONE (OUTPATIENT)
Dept: ANTICOAGULATION | Facility: CLINIC | Age: 68
End: 2023-02-21
Payer: COMMERCIAL

## 2023-02-21 DIAGNOSIS — I48.0 PAF (PAROXYSMAL ATRIAL FIBRILLATION) (H): Primary | ICD-10-CM

## 2023-02-21 DIAGNOSIS — I82.90 DEEP VEIN THROMBOSIS (DVT) OF NON-EXTREMITY VEIN, UNSPECIFIED CHRONICITY: ICD-10-CM

## 2023-02-21 NOTE — TELEPHONE ENCOUNTER
KARON-PROCEDURAL ANTICOAGULATION  MANAGEMENT    ASSESSMENT     Damion Quinones, a 67 year old male on Apixaban (Eliquis), with planned coronary angiogram on 3/2/23. Plan to bridge with Eliquis to Warfarin following procedure.     Indication for Anticoagulation: ACUTE DVT (01/23/2023), Atrial Fibrillation      [id number] = 3 (Hypertension, Age 65-74 and Vascular- DVT)     Not on anticoagulation for Afib prior to DVT      Karon-Procedure Risk stratification for thromboembolism: high (2022 Chest guidelines)    HIGH RISK: 2022 CHEST Perioperative Management guidelines suggests bridging patients at high risk for thromboembolism when interrupting warfarin for an elective surgery/procedure     RECOMMENDATION     DOAC hold duration is bleeding risk of the procedure, renal function of the patient.     2022 Chest Perioperative Management of Antithrombotic Therapy guidelines suggest against perioperative bridging in DOAC patients; considering their rapid offset and rapid onset of action. This plan is notable for high thrombotic risk due to recent DVT within last 3 months; delay of elective procedure for at least 30 days from event has occurred.      Pre-Procedure:  o Confirm patient is still taking Eliquis 5 mg BID (was not given during recent hospitalization at [facility] and isn't addressed in discharge summary)  o Note current administration times for Eliquis in this encounter  o Hold apixaban (Eliquis) for 2 days prior to procedure for CrCl </= 30 ml/min and intermediate to high bleeding risk procedure. Take last dose on Monday, February 27 in PM.   - Arrival time on 3/2 is 12:30PM; this is additional hold time has been factored into 2-days OFF recommendation.  o No Bridge      Post-Procedure:  o Resume therapeutic apixaban 5 mg BID if okay with provider performing procedure on Friday, March 3 in PM   o Start warfarin 1 mg daily on Friday, March 3 in PM  o Check INR on Monday, March 6. Timing of INR draw should be just priot  "to AM Eliquis dose on 3/6 to minimize interference (Eliquis can falsely elevate INR).      Joi Walters, PharmD, BCPS    SUBJECTIVE/OBJECTIVE     Damion Quinones, a 67 year old male    Goal INR Range: 2.0-3.0     Pertinent History: pending listing for liver transplant; See 2/9/23 TE for complete discussion amongst all [facility] stakeholders in the care of Damion Quinones's anticoagulation    Wt Readings from Last 3 Encounters:   02/17/23 99.8 kg (220 lb)   02/09/23 96.5 kg (212 lb 11.9 oz)   02/09/23 96.2 kg (212 lb)      Ideal body weight: 68.4 kg (150 lb 12.7 oz)  Adjusted ideal body weight: 81 kg (178 lb 7.6 oz)     Estimated body mass index is 33.45 kg/m  as calculated from the following:    Height as of 2/17/23: 1.727 m (5' 8\").    Weight as of 2/17/23: 99.8 kg (220 lb).    Lab Results   Component Value Date    INR 1.34 (H) 02/09/2023    INR 1.23 (H) 01/23/2023    INR 1.14 01/15/2023     Lab Results   Component Value Date    HGB 12.3 02/13/2023    HCT 38.2 02/13/2023     02/13/2023     Lab Results   Component Value Date    CR 4.87 (H) 02/13/2023    CR 4.02 (H) 02/09/2023    CR 4.85 (H) 01/28/2023     Estimated Creatinine Clearance: 17.3 mL/min (A) (based on SCr of 4.87 mg/dL (H)).    "

## 2023-02-23 RX ORDER — WARFARIN SODIUM 1 MG/1
1 TABLET ORAL DAILY
Qty: 30 TABLET | Refills: 0 | Status: SHIPPED | OUTPATIENT
Start: 2023-02-23 | End: 2023-03-09

## 2023-02-23 NOTE — TELEPHONE ENCOUNTER
Below plan is reviewed with Nabila.  My chart message is sent with specific details surrounding procedure and warfarin start.  Education packet is mailed today.

## 2023-02-24 NOTE — PROGRESS NOTES
Anticoagulation plan made for transition to warfarin post cath lab. See 2/21 encounter for plan/communication with patient..    Darlene Lares, [facility]

## 2023-02-27 ENCOUNTER — TELEPHONE (OUTPATIENT)
Dept: TRANSPLANT | Facility: CLINIC | Age: 68
End: 2023-02-27
Payer: COMMERCIAL

## 2023-02-27 ENCOUNTER — TELEPHONE (OUTPATIENT)
Dept: ANTICOAGULATION | Facility: CLINIC | Age: 68
End: 2023-02-27
Payer: COMMERCIAL

## 2023-02-27 DIAGNOSIS — I82.409 DVT (DEEP VENOUS THROMBOSIS) (H): Primary | ICD-10-CM

## 2023-02-27 DIAGNOSIS — I82.90 DEEP VEIN THROMBOSIS (DVT) OF NON-EXTREMITY VEIN, UNSPECIFIED CHRONICITY: Primary | ICD-10-CM

## 2023-02-27 NOTE — TELEPHONE ENCOUNTER
Spoke with patient and spouse    Was in [facility] hospital over the weekend and dx with 2 new DVT's in RUE      Was originally dx with cellulitis in arm, but redness was actually related to DVT.      Patient had been on Eliquis since Jan due to LUE and leg prior.     Wants to know how this changes plan for anticoagulation , especially with angio planned this week.      Really wanting to move forward with angio if possible, but wasn't sure    Also concerned about recurrence of DVT's    Having lots of pain in RUE, wanting to know any other options for pain management.

## 2023-02-28 ENCOUNTER — HOSPITAL ENCOUNTER (OUTPATIENT)
Dept: PHYSICAL THERAPY | Facility: REHABILITATION | Age: 68
Discharge: HOME | End: 2023-02-28
Payer: COMMERCIAL

## 2023-02-28 ENCOUNTER — TELEPHONE (OUTPATIENT)
Dept: ANTICOAGULATION | Facility: CLINIC | Age: 68
End: 2023-02-28

## 2023-02-28 DIAGNOSIS — Z74.09 IMPAIRED FUNCTIONAL MOBILITY, BALANCE, GAIT, AND ENDURANCE: Primary | ICD-10-CM

## 2023-02-28 DIAGNOSIS — K70.30 ALCOHOLIC CIRRHOSIS (H): ICD-10-CM

## 2023-02-28 PROCEDURE — 97110 THERAPEUTIC EXERCISES: CPT | Mod: GP

## 2023-02-28 PROCEDURE — 97162 PT EVAL MOD COMPLEX 30 MIN: CPT | Mod: GP

## 2023-02-28 NOTE — TELEPHONE ENCOUNTER
2/28/23    Spoke to Apoorva, patient's wife.  See notes from team.  Cancelling Angiogram d/t recent DVT and admission to [facility] over the weekend.    Instructed patient to continue Eliquis.  Apoorva was requesting information from writer about postponing test.  Writer directed patient to Marysol Saha.    FYI message sent to Marysol that the patient and spouse would appreciate a follow up call.    Aleksandar Estrada RN

## 2023-03-01 ENCOUNTER — TELEPHONE (OUTPATIENT)
Dept: TRANSPLANT | Facility: CLINIC | Age: 68
End: 2023-03-01

## 2023-03-01 ENCOUNTER — OFFICE VISIT (OUTPATIENT)
Dept: CT IMAGING | Facility: CLINIC | Age: 68
End: 2023-03-01
Attending: INTERNAL MEDICINE
Payer: COMMERCIAL

## 2023-03-01 VITALS
HEIGHT: 68 IN | OXYGEN SATURATION: 100 % | SYSTOLIC BLOOD PRESSURE: 104 MMHG | DIASTOLIC BLOOD PRESSURE: 72 MMHG | BODY MASS INDEX: 33.34 KG/M2 | WEIGHT: 220 LBS | TEMPERATURE: 97.6 F | HEART RATE: 80 BPM

## 2023-03-01 DIAGNOSIS — Z99.2 STAGE 5 CHRONIC KIDNEY DISEASE ON CHRONIC DIALYSIS (H): ICD-10-CM

## 2023-03-01 DIAGNOSIS — E44.0 MODERATE PROTEIN-CALORIE MALNUTRITION (H): ICD-10-CM

## 2023-03-01 DIAGNOSIS — K70.30 ALCOHOLIC CIRRHOSIS OF LIVER WITHOUT ASCITES (H): ICD-10-CM

## 2023-03-01 DIAGNOSIS — A04.72 C. DIFFICILE DIARRHEA: Primary | ICD-10-CM

## 2023-03-01 DIAGNOSIS — F10.99 ALCOHOL USE, UNSPECIFIED WITH UNSPECIFIED ALCOHOL-INDUCED DISORDER (H): ICD-10-CM

## 2023-03-01 DIAGNOSIS — N18.6 STAGE 5 CHRONIC KIDNEY DISEASE ON CHRONIC DIALYSIS (H): ICD-10-CM

## 2023-03-01 PROCEDURE — 99213 OFFICE O/P EST LOW 20 MIN: CPT | Performed by: INTERNAL MEDICINE

## 2023-03-01 PROCEDURE — 99215 OFFICE O/P EST HI 40 MIN: CPT | Performed by: INTERNAL MEDICINE

## 2023-03-01 PROCEDURE — 99417 PROLNG OP E/M EACH 15 MIN: CPT | Performed by: INTERNAL MEDICINE

## 2023-03-01 RX ORDER — VANCOMYCIN HYDROCHLORIDE 125 MG/1
CAPSULE ORAL
Qty: 50 CAPSULE | Refills: 1 | Status: SHIPPED | OUTPATIENT
Start: 2023-03-01 | End: 2023-04-21

## 2023-03-01 RX ORDER — METOPROLOL SUCCINATE 25 MG/1
1 TABLET, EXTENDED RELEASE ORAL
Status: ON HOLD | COMMUNITY
Start: 2023-02-22 | End: 2023-06-25

## 2023-03-01 ASSESSMENT — PAIN SCALES - GENERAL: PAINLEVEL: EXTREME PAIN (9)

## 2023-03-01 ASSESSMENT — 6 MINUTE WALK TEST (6MWT)
TOTAL DISTANCE WALKED (FT): 858
TOTAL DISTANCE WALKED (METERS): 261.52

## 2023-03-01 NOTE — NURSING NOTE
"Chief Complaint   Patient presents with     Consult     Consult for C. diff     /72 (BP Location: Right arm, Patient Position: Sitting, Cuff Size: Adult Large)   Pulse 80   Temp 97.6  F (36.4  C) (Oral)   Ht 1.727 m (5' 7.99\")   Wt 99.8 kg (220 lb)   SpO2 100%   BMI 33.46 kg/m      Lorna Palomino on 3/1/2023 at 11:29 AM    "

## 2023-03-01 NOTE — PROGRESS NOTES
"   02/28/23 1100   General Information   Type of Visit Initial OP Ortho PT Evaluation   Start of Care Date 02/28/23   Referring Physician Dr. Jose Poole MD   Patient/Family Goals Statement \"Get stronger:   Orders Evaluate and Treat   Date of Order 12/21/22   Certification Required? No   Medical Diagnosis Alcoholic cirrhosis (H)   Surgical/Medical history reviewed Yes   Presentation and Etiology   Pertinent history of current problem (include personal factors and/or comorbidities that impact the POC) aCsey reports that in September, 2023 his kidneys started to fail. He also reports that his liver is stable but not in good condition either. He has been in and out of the hospital and working with various doctors since then to manage his condition. He started dialysis in November, and things have improved since, but he is currently waiting for kidney/liver transplant. He currently goes to dialysis on Monday, Wednesday, and Friday for about 3.5 hours each day. He was in the hospital a couple of weeks ago for C-diff, and a few days after discharge, he started to notice swelling in his left arm. He went in to have it checked out, and he was diagnosed with a DVT in the left arm. He now presents to PT for general strength and conditioning. He has some tingling in his right arm that started when the swelling started, and as the swelling has decreased, so have his other symptoms. He also reports central low back pain due to prolonged sitting. He reports walking up to 1-2 hours a day total.   Impairments A. Pain;D. Decreased ROM;E. Decreased flexibility;F. Decreased strength and endurance;G. Impaired balance;H. Impaired gait;L. Tingling   Functional Limitations perform activities of daily living;perform desired leisure / sports activities   Symptom Location See history   How/Where did it occur   (See history)   Onset date of current episode/exacerbation 09/01/22   Chronicity Chronic   Pain rating (0-10 point scale) Best " "(/10);Worst (/10);Other   Best (/10) 1/10   Worst (/10) 10/10   Pain rating comment Current: 6/10   Pain quality C. Aching   Frequency of pain/symptoms A. Constant   Pain/symptoms exacerbated by C. Lifting;D. Carrying;H. Overhead reach;I. Bending;J. ADL;K. Home tasks   Pain/symptoms eased by H. Cold;E. Changing positions   Progression of symptoms since onset: Improved   Prior Level of Function   Prior Level of Function-Mobility Independent   Prior Level of Function-ADLs Independent   Fall Risk Screen   Fall screen completed by PT   Have you fallen 2 or more times in the past year? No   Have you fallen and had an injury in the past year? No   Is patient a fall risk? No   Abuse Screen (yes response referral indicated)   Feels Unsafe at Home or Work/School no   Feels Threatened by Someone no   Does Anyone Try to Keep You From Having Contact with Others or Doing Things Outside Your Home? no   Physical Signs of Abuse Present no   Patient needs abuse support services and resources No   Vitals Signs   Heart Rate 87   SpO2 99   Blood Pressure 110/67   Vital Signs Comments Patient reported that these vitals are a little lower than \"normal\" but higher than when they are taken at the beginning of dialysis.   System Outcome Measures   Outcome Measures   (LEFS: 16/80)   Lumbar Spine/SI Objective Findings   Gait/Locomotion Wide base of support, decreased speed. 6 MWT: 261.52 meters (stopped at 5:25; rest break for 45 seconds at care home point).   Hip Flexion (L2) Strength Right: 4+/5. Left: 4/5.   Hip Abduction Strength Bilateral: 5/5   Hip Adduction Strength Bilateral: 5/5   Knee Flexion Strength Bilateral: 5/5   Knee Extension (L3) Strength Bilateral: 5/5   Lumbar/Hip/Knee/Foot Strength Comments 30 Second Sit-to-stand: 9 reps   Cervical Spine   Shoulder Shrug (C2-C4) Strength Left: 5/5   Shoulder Abd (C5) Strength Left: 5/5   Shoulder ER (C5, C6) Strength Left: 5/5   Shoulder IR (C5, C6) Strength Left: 5/5   Elbow Flexion (C5, " C6) Strength Left: 4+5   Elbow Extension (C7) Strength Left: 4-/5   Wrist Extension (C6) Strength Left: 5/5   Thumb Abd (C8) Strength Left: 5/5   5th Finger Add (T1) Strength Left: 5/5   Shoulder/Wrist/Hand Strength Comments Right arm testing held due to current DVT.   Planned Therapy Interventions   Planned Therapy Interventions balance training;gait training;neuromuscular re-education;ROM;strengthening;stretching   Clinical Impression   Criteria for Skilled Therapeutic Interventions Met yes, treatment indicated   PT Diagnosis Impaired functional mobility, balance, gait, and endurance   Influenced by the following impairments Organ failure, upper and lower extremity swelling, DVT, pain   Functional limitations due to impairments Walk, sit, stand, navigate stairs, and other ADLs and leisure activities   Clinical Presentation Evolving/Changing   Clinical Decision Making (Complexity) Moderate complexity   Therapy Frequency 1 time/week   Predicted Duration of Therapy Intervention (days/wks) 10 weeks   Risk & Benefits of therapy have been explained Yes   Patient, Family & other staff in agreement with plan of care Yes   Clinical Impression Comments Casey is a 67-year-old male who presents to physical therapy with impaired functional mobility, balance, gait, and endurance, secondary to kidney/liver compromise, upper and lower extremity swelling, DVT, and pain. These impairments limit his ability to walk, sit, stand, navigate stairs, and perform other ADLs and leisure activities without pain and limitation. He will benefit from skilled therapy to address the listed impairments and limitations and improve his tolerance to functional activity.   Ortho Goal 1   Goal Identifier LEFS - Short Term   Goal Description Casey will demonstrate improved tolerance as evidenced by an improved LEFS score of at least 25/80.   Goal Progress 16/80   Target Date 03/28/23   Ortho Goal 2   Goal Identifier LEFS - Long Term   Goal Description Casey  will demonstrate improved tolerance as evidenced by an improved LEFS score of at least 50/80.   Goal Progress 16/80   Target Date 05/09/23   Ortho Goal 3   Goal Identifier 30 Second Sit-to-stand   Goal Description Casey will demonstrate increased tolerance to functional activity and improved lower extremity strength as evidenced by an improved 30 second sit-to-stand score of at least 16 reps.   Goal Progress 9 reps   Target Date 05/09/23   Ortho Goal 4   Goal Identifier 6 MWT - Short Term   Goal Description Casey will demonstrate improved functional capacity and increased tolerance to functional mobility as evidenced by an improved 6 MWT distance of at least 325 meters.   Goal Progress 261.52 meters (stopped at 5:25 with 45 second standing break at assisted monserrat)   Ortho Goal 5   Goal Identifier 6 MWT - Long Term   Goal Description Casey will demonstrate improved functional capacity and increased tolerance to functional mobility as evidenced by an improved 6 MWT distance of at least 450 meters.   Goal Progress 261.52 meters (stopped at 5:25 with 45 second standing break at assisted monserrat)   Target Date 05/09/23   Ortho Goal 6   Goal Identifier FGA   Goal Description Will assess next visit.   Goal Progress Will assess next visit.   Target Date 05/09/23   Total Evaluation Time   PT Eval, Moderate Complexity Minutes ([id number]) 30

## 2023-03-01 NOTE — PROGRESS NOTES
Assessment:    1. 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 Episodes in 12/2022, 1/2023 and 2/2022 were diagnosed by PCR+/toxin+ (first two episodes) and PCR+ alone (no toxin performed, last episode).  Response to therapy is difficult to determine given his till recently treatment with lactulose, but his symptom profile is suggestive of CDI. He is at higher CDI risk given his cirrhosis. All episodes were likely triggered by antibiotics for non-CDI diagnoses. We discussed that given his underlying cirrhosis, there will be a necessarily somewhat low threshold for abx in the future, but there are still opportunities to be judicious, specifically with respect to SSTI and trying to avoid abx for wounds that are uninfected.    He has not yet been given a taper, and I think this is the next step given what I think is a fairly high risk of spontaneous recurrence.    In addition, there are multiple other moving parts right now with his adjusting to iHD, being off lactulose, etc. Ideally, he will not have a recurrence of CDI after a vanco taper. But, if he does, and assuming his cirrhosis does not further decompensate, I think he may be a potential future candidate for fecal microbiota transplantation. The timing of FMT in my view would ideally be before liver kidney tx, which is presently being discussed and he is undergoing w/u for.    Plan:  - since he has completed 10 days of fidaxomicin (last dose 7 days ago) will start a taper with BID x2 weeks, qday x2 weeks, q other day x2 weeks. This will be 52 days total therapy.   - if he develops new liquid stool +/- abdominal pain or consitutional sx suggestive of CDI, I recommend CD test and (while awaiting results) starting QID PO vanco. Test result and response to therapy will inform assessment of whether future GI sx are CDI or other GI process. Of note he is also at risk for other GI processes, including infection due to cyclospora, microspora, or other enteric pathogens, so future colitis  that does not respond to CDI rx should have an expanded DDX.  - in the event that symptoms arise prompting consideration of abx, I asked him to reach out. Certainly would not withold abx in the event they are indicated, but want to be judicious in their use to not trigger CDI  - continue his good efforts at sleep and nutrition as this will help his transplant candidacy. I committed to doing my part to minimizing rCDI likelihood so his nutrition and sleep status can move forward and not backward.    It is a pleasure to participate in Damion's care. Total visit length 90 min, including 70 min encounter and 20 min care coordination on date of visit.    Naz Cage MD   of Medicine, Division of Infectious Diseases  Contact me on the [facility] or console  pgr [phone]        This is a 66 y/o man with PMH cirrhosis complicated by progressive kidney injury who is now on iHD via a L chest tunnelled catheter. HE is referred for evaluation of presumed recurrent CDI. He has additional comorbidities of alcohol use disorder (not currently consuming alcohol) hemochromatosis (homozygosity for H63D) and variceal bleeding s/p TIPS. History is obtained from Damion and his wife. His wife provides most of the history, but Damion is engaged in conversation.    Damion has been followed by GI for several years, though his f/u was sporadic during COVID. Previously followed at [facility]. In 9/2022 he developed hematemasis requiring hospitalization and TIPS with octerotide, and his cirrhosis went into more of a decompensated state. Unfortunately, he went on to developed progressive kidney dysfunction attributed to decompensated liver failure along with ANCA vasculitis. He was initiated on iHD in the more recent past. Starting on iHD has resulted in less confusion and has allowed Damion to discontinue lactulose.    His wife describes a pattern of events between 12/2022 and 2/2023 in which Damion would be  hospitalized for confusion, he would be administered antibiotics, and these antibiotics would trigger CDI. His wife describes another instance in which he was presumed to have an SSTI and prescribed cephalexin, and subsequently developed CDI. CDI symptoms are manifest per their description as watery stool, more frequent than baseline (3-5x/day is baseline) and abdominal discomfort. Non-GI symptoms with CDI episodes include feeling week. Damion has positive CD results for 12/2022, 1/2023, and 2/2023.     His 12/2022 CDI episode was treated with 10 days of QID Po vanco; the second (1/2023) with 10d BID fidaxomicin, and the third (2/2023) with 10d fidaxomicin. He has been off fidaxomicin for 1 week. Recently, his stools have become slightly less formed than baseline. No new abdominal pain or cramping.    Unfortunately, he recently developed R hand swelling and was diagnosed with a DVT. He was placed on apixiban. He continues to have R hamd discomfort from the swelling.    On ROS, Damion has semi-frequent skin wounds that are sometimes spontaneous and other times trauma-induced. On one occasion, in around 10/2022, there was surrounding cellulitis. However, most occasions are unaccompanied by cellulitis symptoms.     He still produces urine. His appetite is hit or miss, but he's trying to keep up with his nutrition.        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     Esophageal varices in cirrhosis (H)     Essential hypertension     Family history of colon cancer     Family history of prostate cancer     GAVE (gastric antral vascular ectasia)     Hereditary hemochromatosis  "(H)     Other hyperlipidemia     Psoriasis     S/P TIPS (transjugular intrahepatic portosystemic shunt)     Tophaceous gout     Deep vein thrombosis (DVT) of non-extremity vein, unspecified chronicity     Current Outpatient Medications   Medication     apixaban ANTICOAGULANT (ELIQUIS) 5 MG tablet     B Complex-C-Folic Acid (MAKEDA-KENISHA) TABS     bumetanide (BUMEX) 1 MG tablet     Calcium Carbonate-Vitamin D 500-3.125 MG-MCG TABS     folic acid (FOLVITE) 1 MG tablet     lactulose (CHRONULAC) 10 GM/15ML solution     lactulose (CHRONULAC) 10 GM/15ML solution     metoprolol succinate ER (TOPROL XL) 25 MG 24 hr tablet     omeprazole 20 MG tablet     predniSONE (DELTASONE) 5 MG tablet     spironolactone (ALDACTONE) 50 MG tablet     vancomycin (VANCOCIN) 125 MG capsule     XIFAXAN 550 MG TABS tablet     zinc sulfate (ZINCATE) 220 (50 Zn) MG capsule     diltiazem ER (DILT-XR) 120 MG 24 hr capsule     multivitamin w/minerals (THERA-VIT-M) tablet     warfarin ANTICOAGULANT (COUMADIN) 1 MG tablet     No current facility-administered medications for this visit.     Social History     Tobacco Use     Smoking status: Never     Smokeless tobacco: Never   Vaping Use     Vaping Use: Never used   Substance Use Topics     Alcohol use: Not Currently     Comment: Last ETOH use was 12/31/2021     Drug use: Not Currently     Exam:      /72 (BP Location: Right arm, Patient Position: Sitting, Cuff Size: Adult Large)   Pulse 80   Temp 97.6  F (36.4  C) (Oral)   Ht 1.727 m (5' 7.99\")   Wt 99.8 kg (220 lb)   SpO2 100%   BMI 33.46 kg/m      GENERAL: Healthy, alert and no distress  EYES: Eyes grossly normal to inspection.  No discharge or erythema, or obvious scleral/conjunctival abnormalities.  RESP: No audible wheeze, cough, or visible cyanosis.  No visible retractions or increased work of breathing.    SKIN: R hand swelling, no erythema. L chest TDC.  NEURO: Cranial nerves grossly intact.  Mentation and speech appropriate for " age.  PSYCH: Mentation appears normal, affect normal/bright, judgement and insight intact, normal speech and appearance well-groomed.

## 2023-03-01 NOTE — TELEPHONE ENCOUNTER
Spoke to Casey's wife Nabila.  Confirmed that cath lab procedure for tomorrow is cancelled pending findings from heme / onc consult on Friday regarding clots. Patient also had appt today for c-diff infection.

## 2023-03-01 NOTE — TELEPHONE ENCOUNTER
Sent text message to Dr. Bennett requesting he review this message thread and reply all with advisement and to confer with other physician team members as needed.  Awaiting reply.

## 2023-03-01 NOTE — TELEPHONE ENCOUNTER
Pt's wife calling - Marysol was going to get back with them if we were still going to proceed with Angiogram  tomorrow  Scheduled for noon  Needs a call back if needs to hold his meds  Tomorrow

## 2023-03-02 ENCOUNTER — TELEPHONE (OUTPATIENT)
Dept: INFECTIOUS DISEASES | Facility: CLINIC | Age: 68
End: 2023-03-02
Payer: COMMERCIAL

## 2023-03-02 NOTE — TELEPHONE ENCOUNTER
PA Initiation    Medication: vancomycin (VANCOCIN) 125 MG capsule   Insurance Company: [employer] - Phone [phone] Fax [phone]  Pharmacy Filling the Rx: [id number] [place] [address]  AT [facility] & [address]  Filling Pharmacy Phone: [phone]  Filling Pharmacy Fax: [phone]  Start Date: 3/2/2023

## 2023-03-02 NOTE — TELEPHONE ENCOUNTER
Prior Authorization Retail Medication Request    Medication/Dose: vancomycin (VANCOCIN) 125 MG capsule- Take 1 tab 2x/day for 2wks, then 1 tab 1x/day for 2 wks, then 1 tab every other day for 2 wks, then stop. Increase to 4x/day in the event of CDI symptoms.  ICD code (if different than what is on RX):  C. difficile diarrhea [A04.72]  Previously Tried and Failed:  Fidoxamicin  Rationale:   recurrent CDI. Episodes in 12/2022, 1/2023 and 2/2022 were diagnosed by PCR+/toxin+ (first two episodes) and PCR+ alone (no toxin performed, last episode).   He is at higher CDI risk given his cirrhosis. All episodes were likely triggered by antibiotics for non-CDI diagnoses. Patient will need future antibiotics and will need vancomycin to prevent further C Diff infections and costly preventable side effects.    Insurance Name:  [facility] OUT OF STATE   Insurance ID:  [id number]

## 2023-03-02 NOTE — PROGRESS NOTES
[facility]  [address] [place] [id number]  Main: [phone], Fax: [phone]    Patient seen at: [facility] at [address] [place] [address]    Outpatient Visit Note:    Patient: Damion Quinones  MRN: [id number]  : 1955  LILY: 2023    Reason for visit:  Right leg DVT back on 2023. Recent right upper extremity DVT (right internal jugular vein) on 2023 found at [facility] despite being on anticoagulation therapy with apixaban.     HPI:  Damion is a 67 year old male with a very complicated past medical history including end stage renal disease on hemodialysis via a left internal jugular vein catheter, history of alcohol associated cirrhosis causing hepatic encephalopathy and esophageal varices S/P TIPS, history of paroxysmal atrial fibrillation (who was not on anticoagulation therapy prior to 2023), psoriatic arthritis, HTN, ANCA vasculitis, T2DM2, hereditary hemochromatosis, who also has a history of recurrent C.diff colitis, referred to our clinic by the transplant clinic (Dr. Jose Poole) for consultation in regard to a recent finding of a right internal jugular vein thrombosis despite already being on apixaban.     Very Brief Cirrhosis History Summary:  Damion has cirrhosis secondary to alcohol use and subsequent hepatic encephalopathy, ascites and varices S/P TIPS in 2022 and variceal banding. He has been evaluated by GI as well as liver transplant team for possible future liver transplant.    Very Brief CKD History Summary:  He has Stage 4 CKD secondary to IgA vasculitis vs cirrhosis. His Creatinine back in 2023 was 5.3 and at the time the decision was made for the patient to start hemodialysis. During his admission on 2023, a left sided internal jugular vein hemodialysis catheter was placed. He is also being evaluated for renal transplant.      Very Brief Atrial fibrillation  "History Summary:  He was found to have paroxysmal atrial fibrillation about 8 years ago and was controlled with diltiazem without being on anticoagulation therapy prior to his admission on 1/23/2023.     Very Brief C.diff colitis History Summary:   First found to have C.diff colitis in Dec 2022. Then had recurrence in 1/15/2023 and placed on fidoxamicin by infectious disease consultant.     Very Brief Hemochromatosis History Summary:  Was diagnosed with hemochromatosis by his GI physician back around 2019. He reports that he had undergone phlebotomy x 2 in the past but then loss follow up about this when COVID-19 pandemic occurred. Currently the patient is anemic with most recent hemoglobin at 10.6 on 2/25/2023.     Thrombosis History:  Back in Sept 2022, he was taken off of Humira because of worsening renal function and was placed on burst of prednisone. Then he developed GI bleeding for which he was hospitalized at [facility] and found acute renal failure. At the time, he needed urgent hemodialysis for just one time via a right sided internal jugular vein accessed line. This information is important as the concerning issue today is surrounding the finding of a \"mild localized thin and nonocclusive DVT in the mid right internal jugular vein\" on 2/25/2023.     Damion was hospitalized from 12/16/2022 to 12/21/2022 with C.diff colitis. Then he was again hospitalized from 1/15/2023 to 1/16/2023 for recurrence of C.diff colitis. Despite his creatinine at the time at around 6, he was not on hemodialysis as of yet.     1/2/2023, a left upper extremity ultrasound was done at [facility] for Left upper extremity pain after IV access. This ultrasound showed no DVT but he has a short segment superficial thrombosis of the cephalic vein at the antecubital fossa. Conservatively managed and improved with some pain medications.     Then on 1/23/2023, he returned to the emergency department with confusion and was " "admitted for management of acute toxic metabolic encephalopathy / hepatic encephalopathy. At the time of this admission, he was noted to have right leg edema and thus a right lower extremity venous ultrasound was done on 1/23/2023 showing a nonocclusive DVT within the right femoral vein in the lower thigh, an occlusive DVT in the popliteal vein and occlusive DVT in the peroneal veins. Because of this, he was placed on unfractionated heparin during this hospitalization and subsequently was discharged on 1/28/2023 with apixaban. Inpatient hematology consult team was not consulted for this DVT at the time.     Also during this 1/23/2023 hospitalization, the decision was made for him to start hemodialysis. Thus a left sided chest tunneled hemodialysis catheter was placed by interventional radiology via left internal jugular vein access on 1/26/2023.     2/13/2023, he presented to the emergency department after a run of his dialysis with altered mental status. Initially there was concern that he is hypotensive but his BP was normal upon arrival. He was given a bolus of fluid at 500 mL and was discharged home. IV placed over right upper extremity.     2/17/2023, he was seen by Tito Rodriges PA-C of family medicine and was found to have right upper extremity cellulitis, given cephalexin x 10 days.     2/21/2023, he was seen by Dr. Tito Schmidt of [facility] in the office as his right upper extremity pain and swelling did not improve. Added another antibiotics.     2/25/2023, he presented to the emergency department at [facility] with ongoing right arm pain and swelling. A right upper extremity venous ultrasound was done and he was found to have a nonocclusive DVT in the mid right internal jugular vein which was described in the report as \"mild localized thin and nonocclusive DVT in the mid right internal jugular vein\". As well as a superficial thrombophlebitis involving the right cephalic vein in the region of the " antecubital fossa. He was discharged and remained on apixaban.     Because of this finding of right internal jugular venous DVT, his originally scheduled angiogram with right heart cath that is scheduled for 3/2/2023 was cancelled. There was some mentioned about switching him to warfarin but ultimately the decision was to keep him on apixaban and refer the patient to this clinic for consultation.      Today, he continues to complaint of sever pain over his right hand and also quite swollen. He reports that the pain is keeping him up at night. He also complaint of bilateral lower extremities swelling but not really painful.     ROS:  Denies any bleeding complications. Specifically, no frequent epistaxis. No issues with oral mucosal bleeding. Denies any hematuria or blood in stools. Denies any shortness of breath. No chest pain. No cough. No fever.    Medications:  Current Outpatient Medications   Medication     apixaban ANTICOAGULANT (ELIQUIS) 5 MG tablet     B Complex-C-Folic Acid (MAKEDA-KENISHA) TABS     bumetanide (BUMEX) 1 MG tablet     Calcium Carbonate-Vitamin D 500-3.125 MG-MCG TABS     diltiazem ER (DILT-XR) 120 MG 24 hr capsule     folic acid (FOLVITE) 1 MG tablet     lactulose (CHRONULAC) 10 GM/15ML solution     lactulose (CHRONULAC) 10 GM/15ML solution     metoprolol succinate ER (TOPROL XL) 25 MG 24 hr tablet     multivitamin w/minerals (THERA-VIT-M) tablet     omeprazole 20 MG tablet     predniSONE (DELTASONE) 5 MG tablet     spironolactone (ALDACTONE) 50 MG tablet     vancomycin (VANCOCIN) 125 MG capsule     warfarin ANTICOAGULANT (COUMADIN) 1 MG tablet     XIFAXAN 550 MG TABS tablet     zinc sulfate (ZINCATE) 220 (50 Zn) MG capsule     No current facility-administered medications for this visit.     Allergies:   No Known Allergies    PmHx:  Past Medical History:   Diagnosis Date     Alcoholic cirrhosis of liver with ascites (H) 10/11/2019     Arthritis     RA     C. difficile colitis      Chronic kidney  disease      Coronary artery disease     AFib 2016     Diabetes mellitus type 2, uncomplicated (H) 10/11/2019     History of blood transfusion     2016     History of hemochromatosis 10/11/2019     Hypertension      Hypokalemia      Obesity      PAF (paroxysmal atrial fibrillation) (H)      Psoriatic arthritis (H)        Social History:   Deferred.    Family History:  Deferred.    Objective:  Vitals: BP 93/60 (BP Location: Left arm, Patient Position: Sitting, Cuff Size: Adult Regular)   Pulse 80   Temp 97.6  F (36.4  C) (Oral)   Wt 99.7 kg (219 lb 11.2 oz)   SpO2 100%   BMI 33.41 kg/m    Exam:   Rather swollen right hand and rather painful with palpation of his right hand.       Bilateral lower extremity pitting edema noted.       Labs:  Component      Latest Ref Rng & Units 11/22/2022 12/16/2022 12/20/2022 12/21/2022   INR      0.85 - 1.15 1.03 1.20 (H) 1.20 (H) 1.23 (H)     Component      Latest Ref Rng & Units 12/27/2022 1/15/2023 1/23/2023 2/9/2023   INR      0.85 - 1.15 1.07 1.14 1.23 (H) 1.34 (H)     2/25/2023 CBC done at [facility]:  WBC 11.6; Hgb 10.6; PLT 195K    2/14/2023: LFTs done at [facility]:  Alk Phos 154; AST 30; ALT 27; Total bilirubin 1.5; Direct bilirubin 0.7; Total protein 4.8; Albumin 2.0.     Imaging:  Reviewed and are as described above.     Assessment:  In summary, Damion is a 67 year old male with a very complicated past medical history including end stage renal disease on hemodialysis via a left internal jugular vein catheter, history of alcohol associated cirrhosis causing hepatic encephalopathy and esophageal varices S/P TIPS, history of paroxysmal atrial fibrillation (who was not on anticoagulation therapy prior to Jan 2023), psoriatic arthritis, HTN, ANCA vasculitis, T2DM2, hereditary hemochromatosis, who also has a history of recurrent C.diff colitis, referred to our clinic by the transplant clinic (Dr. Jose Poole) for consultation in regard to a recent finding of a right  internal jugular vein thrombosis despite already being on apixaban.     Damion's right leg DVT back on 1/23/2023 was a provoked event as at the time he has numerous hospitalization for recurrent C.diff colitis and encephalopathy.     He also has had numerous superficial thrombophlebitis of both of his upper extremities which were all caused by peripheral IV catheter placements.     His right internal jugular vein thrombosis found on 2/25/2023 at [facility] was age indeterminate. This finding of a internal jugular vein clot might have been chronic as his previous temporary dialysis catheter back in Sept 2022 was placed via his right internal jugular vein. It is conceivable this right internal jugular vein thrombus occurred shortly after the right internal jugular vein line was placed.     Apixaban is almost entirely metabolized hepatically. Thus in theory, given the fact that Damion has cirrhosis, his apixaban blood level might be supra-therapeutic which make him more at risk for bleeding not thrombosis. Dialysis has little effect on apixaban blood level and thus it should not cause him to have subtherapeutic apixaban blood level in theory.     Damion's most concerning symptoms to him today is the complaint of right mid forearm to hand swelling that is rather painful to the point that he losses sleep at night. This swelling and pain is out of proportion of the finding of a superficial right upper extremity thrombophlebitis on 2/25/2023 ultrasound and a nonocclusive thrombus of the right internal jugular vein. On exam (no evidence of ecchymosis) and his clinical history does not suggest that this swelling and pain is related to a soft tissue hemorrhage. Also on exam today, it is not consistent with cellulitis. Thus the cause of his right mid forearm to right hand swelling and pain remains unclear.     Diagnosis:    Provoked right lower extremity DVT found on 1/23/2023.     Peripheral IV catheter provoked  superficial thrombophlebitis of the left upper extremity on 1/2/2023. Resolved.     Peripheral IV catheter provoked superficial thrombophlebitis of the right upper extremity found on 2/25/2023.     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     Right mid forearm to right hand swelling and pain of unclear etiology.     Cirrhosis, being evaluated for liver/kidney transplant.     End stage renal disease on hemodialysis via left chest tunnel catheter since Jan 2023.     History of paroxysmal atrial fibrillation, was not on anticoagulation therapy prior to Jan 2023.     Hereditary hemochromatosis.    History of psoriatic arthritis and rheumatoid arthritis.     Recurrent C.diff colitis.     Plan:  I have a long discussion today with the patient and his wife in regard to our plan and recommendation.     I also discussed this case extensively with Dr. Vamshi Gutierrez, staff hematologist and come up with our following combined recommendation.     We do not feel that this patient has failed apixaban. As mentioned, his finding of a right internal jugular vein nonocclusive thrombus was age indeterminate and it is conceivable that this clot was a chronic thrombus and developed shortly after his temporary dialysis catheter placement via the right internal jugular vein back in Sept 2022 at [facility].     This patient has both cirrhosis and end stage renal disease making the choices of anticoagulation extremely limited. He is not a good candidate for warfarin as his baseline INR is elevated due to his cirrhosis and thus we are not able to reliably and safely monitor his warfarin. As mentioned, apixaban is almost entirely metabolized hepatically and thus this could cause supra-therapeutic blood level and in turn placing the patient at  risk for bleeding complications. Rivaroxaban is 30% renally excreted and 70% hepatically excreted and thus it is also not a good choice for this patient. Dabigatran and enoxaparin are exclusively renally excreted and again not good choices for this patient. The only remaining anticoagulation therapy is unfractionated heparin which pose a challenge on its own as it is difficult to manage as an outpatient with therapeutic subcutaneous injections.     With that said, the patient has already been on apixaban since 1/28/2023 and has had no issues with bleeding. Thus he might be actually therapeutic on his apixaban. His right leg swelling has improved and thus it is likely that apixaban has been effective in treating at least his right leg DVT.     Ultimately, the question is if this patient needs to stay on indefinite anticoagulation therapy. So far all his venous thromboembolism are provoked and thus in theory he might just need only 3-6 months of anticoagulation therapy for all these provoked venous thromboembolic events.     At this time our plan is as follow:     Continue apixaban for now.     I will obtain an apixaban blood level today (3-4 hours after he has taken his apixaban at 10 am this morning). I wll also check CBC, INR and aPTT.     Repeat a right upper extremity ultrasound today or early next week.     Plan to repeat a right lower extremity venous ultrasound after 4/23/2023 (3 months from the time of right leg DVT diagnosis).      Hold off on angiogram (last of the testing needed for the patient to be placed on transplant list), if possible, until after 4/23/2023 to minimize interruption of his anticoagulation therapy during this first 3 months of treatment.     I have provided him with a prescription of Oxycodone x 15 tablets without refills today.     I also provided him with a prescription for compression sleeves today in attempt for symptomatic management of his right hand swelling and pain.     I will  have patient scheduled to see one of our physicians in our clinic when he returns for follow up in April 2023 after his repeat right lower extremity ultrasound.       Bhupinder Yancey PA-C, [facility]  Physician Assistant  [facility].     80 minutes spent on the date of the encounter doing chart review, history and exam, documentation and further activities per the note.    Time IN: 11:05am  Time OUT: 12:20pm     ADDENDUM 3/3/3023 at 15:00:  Right upper extremity venous ultrasound was done today (3/3/2023). Received call from reading radiologist for urgent report:  1.  New nonocclusive thrombus in right axillary vein.   2.  Persistent nonocclusive thrombus in mid right internal jugular vein.   3.  Subcutaneous edema of the right forearm and dorsal hand.    This writer contacted Dr. Vamshi Gutierrez, staff hematologist and further discuss this findings. It is unclear why this patient has new thrombosis despite being on apixaban. Dr. Gutierrez is concern about possible mechanical obstruction leading to his recurrent right upper extremity DVT as in theory with him having cirrhosis, his apixaban level should be at least therapeutic. Although the patient does have quite a bit of swelling and pain of his right hand, this has been going on for the past 2-3 weeks and is stable and is not worsening. Dr. Gutierrez would like to await for the apixaban blood level (which this writer called the special coagulation lab inquiring about the turn around time), which should be back by 3/4/2023. If his apixaban blood level is therapeutic or supra-therapeutic, then his recurrent right upper extremity DVT is not related to inadequate anticoagulation therapy, it might be related to mechanical compression (I.e. possible thoracic outlet syndrome) and thus he might need imaging to further evaluate and possibly interventional radiology consultation if found.       Bhupinder Yancey PA-C, FALGUNI  Physician  Assistant  [facility].

## 2023-03-02 NOTE — TELEPHONE ENCOUNTER
Prior Authorization Not Needed per Insurance    Medication: vancomycin (VANCOCIN) 125 MG capsule--NO PA NEEDED  Insurance Company: [employer] - Phone [phone] Fax [phone]  Expected CoPay:      [id number] [place] [address] [address] [facility] AT [facility]  Pharmacy Notified: Yes  Patient Notified: Yes **Instructed pharmacy to notify patient when script is ready to /ship.**

## 2023-03-03 ENCOUNTER — HOSPITAL ENCOUNTER (OUTPATIENT)
Dept: ULTRASOUND IMAGING | Facility: CLINIC | Age: 68
Discharge: HOME | End: 2023-03-03
Attending: PHYSICIAN ASSISTANT
Payer: COMMERCIAL

## 2023-03-03 ENCOUNTER — OFFICE VISIT (OUTPATIENT)
Dept: HEMATOLOGY | Facility: CLINIC | Age: 68
End: 2023-03-03
Attending: INTERNAL MEDICINE
Payer: COMMERCIAL

## 2023-03-03 ENCOUNTER — TELEPHONE (OUTPATIENT)
Dept: HEMATOLOGY | Facility: CLINIC | Age: 68
End: 2023-03-03

## 2023-03-03 VITALS
HEART RATE: 80 BPM | OXYGEN SATURATION: 100 % | TEMPERATURE: 97.6 F | WEIGHT: 219.7 LBS | BODY MASS INDEX: 33.41 KG/M2 | SYSTOLIC BLOOD PRESSURE: 93 MMHG | DIASTOLIC BLOOD PRESSURE: 60 MMHG

## 2023-03-03 DIAGNOSIS — E83.110 HEREDITARY HEMOCHROMATOSIS (H): ICD-10-CM

## 2023-03-03 DIAGNOSIS — K70.31 ALCOHOLIC CIRRHOSIS OF LIVER WITH ASCITES (H): ICD-10-CM

## 2023-03-03 DIAGNOSIS — K72.10 END-STAGE LIVER DISEASE (H): Primary | ICD-10-CM

## 2023-03-03 DIAGNOSIS — L40.50 PSORIATIC ARTHRITIS (H): ICD-10-CM

## 2023-03-03 DIAGNOSIS — I82.4Y1 DEEP VEIN THROMBOSIS (DVT) OF PROXIMAL VEIN OF RIGHT LOWER EXTREMITY, UNSPECIFIED CHRONICITY (H): ICD-10-CM

## 2023-03-03 DIAGNOSIS — N18.6 ESRD (END STAGE RENAL DISEASE) ON DIALYSIS (H): ICD-10-CM

## 2023-03-03 DIAGNOSIS — Z99.2 ESRD (END STAGE RENAL DISEASE) ON DIALYSIS (H): ICD-10-CM

## 2023-03-03 DIAGNOSIS — K72.10 END-STAGE LIVER DISEASE (H): ICD-10-CM

## 2023-03-03 DIAGNOSIS — I82.C11 INTERNAL JUGULAR VEIN THROMBOSIS, RIGHT (H): ICD-10-CM

## 2023-03-03 DIAGNOSIS — I82.409 DVT (DEEP VENOUS THROMBOSIS) (H): ICD-10-CM

## 2023-03-03 LAB
APTT PPP: 34 SECONDS (ref 22–38)
ERYTHROCYTE [DISTWIDTH] IN BLOOD BY AUTOMATED COUNT: 17.4 % (ref 10–15)
HCT VFR BLD AUTO: 33.3 % (ref 40–53)
HGB BLD-MCNC: 10.7 G/DL (ref 13.3–17.7)
INR PPP: 1.63 (ref 0.85–1.15)
MCH RBC QN AUTO: 29.6 PG (ref 26.5–33)
MCHC RBC AUTO-ENTMCNC: 32.1 G/DL (ref 31.5–36.5)
MCV RBC AUTO: 92 FL (ref 78–100)
PLATELET # BLD AUTO: 193 10E3/UL (ref 150–450)
RBC # BLD AUTO: 3.62 10E6/UL (ref 4.4–5.9)
WBC # BLD AUTO: 11.5 10E3/UL (ref 4–11)

## 2023-03-03 PROCEDURE — 85027 COMPLETE CBC AUTOMATED: CPT | Performed by: PHYSICIAN ASSISTANT

## 2023-03-03 PROCEDURE — 93971 EXTREMITY STUDY: CPT | Mod: RT

## 2023-03-03 PROCEDURE — 36415 COLL VENOUS BLD VENIPUNCTURE: CPT | Performed by: PHYSICIAN ASSISTANT

## 2023-03-03 PROCEDURE — 99215 OFFICE O/P EST HI 40 MIN: CPT | Performed by: PHYSICIAN ASSISTANT

## 2023-03-03 PROCEDURE — 85610 PROTHROMBIN TIME: CPT | Performed by: PHYSICIAN ASSISTANT

## 2023-03-03 PROCEDURE — 85730 THROMBOPLASTIN TIME PARTIAL: CPT | Performed by: PHYSICIAN ASSISTANT

## 2023-03-03 PROCEDURE — 93971 EXTREMITY STUDY: CPT | Mod: 26 | Performed by: RADIOLOGY

## 2023-03-03 PROCEDURE — 80299 QUANTITATIVE ASSAY DRUG: CPT | Performed by: PHYSICIAN ASSISTANT

## 2023-03-03 PROCEDURE — 99211 OFF/OP EST MAY X REQ PHY/QHP: CPT | Mod: 25 | Performed by: PHYSICIAN ASSISTANT

## 2023-03-03 PROCEDURE — 99417 PROLNG OP E/M EACH 15 MIN: CPT | Performed by: PHYSICIAN ASSISTANT

## 2023-03-03 RX ORDER — OXYCODONE HYDROCHLORIDE 5 MG/1
TABLET ORAL
Qty: 15 TABLET | Refills: 0 | Status: SHIPPED | OUTPATIENT
Start: 2023-03-03 | End: 2023-03-03

## 2023-03-03 RX ORDER — OXYCODONE HYDROCHLORIDE 5 MG/1
TABLET ORAL
Qty: 15 TABLET | Refills: 0 | Status: SHIPPED | OUTPATIENT
Start: 2023-03-03 | End: 2023-04-21

## 2023-03-03 NOTE — PATIENT INSTRUCTIONS
[facility]  [facility]  [facility], [place] [address]  Main: [phone], Fax: [phone]    It was a pleasure seeing you today.  Thank you for allowing us to be involved in your care. Please let us know if there is anything else we can do for you, so that we can be sure you are leaving completely satisfied with your care experience.    Labs today.  Please get your labs completed after your 1 pm ultrasound.     Please stay on your blood thinner:  Eliquis  Please call us with any bleeding issues that you may have.    Today, you will get an ultrasound of your right arm.    We would like you to repeat your imaging of your right leg in the end of April. I will call or mychart you next week to coordinate this appointment.     Wear compression stockings and/or compression sleeve if you have swelling in your leg/arm. Take them off while you are sleeping. You may need to get new stockings every 3-6 months with regular wear.    Patient Resources:   For additional information, please see the following web links:  www.stoptheclot.org, www.clotconnect.org.    Call the [facility]  at [phone].     -If surgeries or procedures are planned (for holding instructions).     -If off anticoagulation, please call during high risk times (long-distance travel, broken bones or trauma, immobilization, surgery, pregnancy, or taking estrogen).     -Any new symptoms of DVT (deep vein thrombosis) or PE (pulmonary embolism)    -pain     -swelling     -redness    -warmth    -shortness of breath    -chest pain    -coughing up blood    Return to clinic at the end of April.  Please schedule return appointment with one of our doctors.    Your nurse clinician is Xuan Johnson RN, BSN, PCCN [phone].   If she is unavailable and you have immediate concerns, please call [phone] and ask for a nurse.

## 2023-03-03 NOTE — TELEPHONE ENCOUNTER
ACC received pt's INR result today of 1.63.  Chart reviewed-patient instructed to continue apixaban by hematology today 3/3/23. Apixaban Anti-Xa level planned. No warfarin dosing needed for result.

## 2023-03-03 NOTE — Clinical Note
3/3/2023       RE: Damion Quinones   [address] [facility] [address]     Dear Colleague,    Thank you for referring your patient, Daimon Quinones, to the [facility] at [facility]. Please see a copy of my visit note below.        [facility]  [address] [place] [address]  Main: [phone], Fax: [phone]    Patient seen at: [facility] at [address] [place] [address]    Outpatient Visit Note:    Patient: Damion Quinones  MRN: [id number]  : 1955  LILY: 2023    Reason for visit:  Right leg DVT back on 2023. Recent right upper extremity DVT (right internal jugular vein) on 2023 found at [facility] despite being on anticoagulation therapy with apixaban.     HPI:  Damion is a 67 year old male with a very complicated past medical history including end stage renal disease on hemodialysis via a left internal jugular vein catheter, history of alcohol associated cirrhosis causing hepatic encephalopathy and esophageal varices S/P TIPS, history of paroxysmal atrial fibrillation (who was not on anticoagulation therapy prior to 2023), psoriatic arthritis, HTN, ANCA vasculitis, T2DM2, hereditary hemochromatosis, who also has a history of recurrent C.diff colitis, referred to our clinic by the transplant clinic (Dr. Jose Poole) for consultation in regard to a recent finding of a right internal jugular vein thrombosis despite already being on apixaban.     Very Brief Cirrhosis History Summary:  Damion has cirrhosis secondary to alcohol use and subsequent hepatic encephalopathy, ascites and varices S/P TIPS in 2022 and variceal banding. He has been evaluated by GI as well as liver transplant team for possible future liver transplant.    Very Brief CKD History Summary:  He has Stage 4 CKD secondary to IgA  "vasculitis vs cirrhosis. His Creatinine back in Jan 2023 was 5.3 and at the time the decision was made for the patient to start hemodialysis. During his admission on 1/23/2023, a left sided internal jugular vein hemodialysis catheter was placed. He is also being evaluated for renal transplant.      Very Brief Atrial fibrillation History Summary:  He was found to have paroxysmal atrial fibrillation about 8 years ago and was controlled with diltiazem without being on anticoagulation therapy prior to his admission on 1/23/2023.     Very Brief C.diff colitis History Summary:   First found to have C.diff colitis in Dec 2022. Then had recurrence in 1/15/2023 and placed on fidoxamicin by infectious disease consultant.     Very Brief Hemochromatosis History Summary:  Was diagnosed with hemochromatosis by his GI physician back around 2019. He reports that he had undergone phlebotomy x 2 in the past but then loss follow up about this when COVID-19 pandemic occurred. Currently the patient is anemic with most recent hemoglobin at 10.6 on 2/25/2023.     Thrombosis History:  Back in Sept 2022, he was taken off of Humira because of worsening renal function and was placed on burst of prednisone. Then he developed GI bleeding for which he was hospitalized at [facility] and found acute renal failure. At the time, he needed urgent hemodialysis for just one time via a right sided internal jugular vein accessed line. This information is important as the concerning issue today is surrounding the finding of a \"mild localized thin and nonocclusive DVT in the mid right internal jugular vein\" on 2/25/2023.     Damion was hospitalized from 12/16/2022 to 12/21/2022 with C.diff colitis. Then he was again hospitalized from 1/15/2023 to 1/16/2023 for recurrence of C.diff colitis. Despite his creatinine at the time at around 6, he was not on hemodialysis as of yet.     1/2/2023, a left upper extremity ultrasound was done at [facility]" for Left upper extremity pain after IV access. This ultrasound showed no DVT but he has a short segment superficial thrombosis of the cephalic vein at the antecubital fossa. Conservatively managed and improved with some pain medications.     Then on 1/23/2023, he returned to the emergency department with confusion and was admitted for management of acute toxic metabolic encephalopathy / hepatic encephalopathy. At the time of this admission, he was noted to have right leg edema and thus a right lower extremity venous ultrasound was done on 1/23/2023 showing a nonocclusive DVT within the right femoral vein in the lower thigh, an occlusive DVT in the popliteal vein and occlusive DVT in the peroneal veins. Because of this, he was placed on unfractionated heparin during this hospitalization and subsequently was discharged on 1/28/2023 with apixaban. Inpatient hematology consult team was not consulted for this DVT at the time.     Also during this 1/23/2023 hospitalization, the decision was made for him to start hemodialysis. Thus a left sided chest tunneled hemodialysis catheter was placed by interventional radiology via left internal jugular vein access on 1/26/2023.     2/13/2023, he presented to the emergency department after a run of his dialysis with altered mental status. Initially there was concern that he is hypotensive but his BP was normal upon arrival. He was given a bolus of fluid at 500 mL and was discharged home. IV placed over right upper extremity.     2/17/2023, he was seen by Tito Rodriges PA-C of family medicine and was found to have right upper extremity cellulitis, given cephalexin x 10 days.     2/21/2023, he was seen by Dr. Tito Schmidt of [facility] in the office as his right upper extremity pain and swelling did not improve. Added another antibiotics.     2/25/2023, he presented to the emergency department at [facility] with ongoing right arm pain and swelling. A right upper extremity  "venous ultrasound was done and he was found to have a nonocclusive DVT in the mid right internal jugular vein which was described in the report as \"mild localized thin and nonocclusive DVT in the mid right internal jugular vein\". As well as a superficial thrombophlebitis involving the right cephalic vein in the region of the antecubital fossa. He was discharged and remained on apixaban.     Because of this finding of right internal jugular venous DVT, his originally scheduled angiogram with right heart cath that is scheduled for 3/2/2023 was cancelled. There was some mentioned about switching him to warfarin but ultimately the decision was to keep him on apixaban and refer the patient to this clinic for consultation.      Today, he continues to complaint of sever pain over his right hand and also quite swollen. He reports that the pain is keeping him up at night. He also complaint of bilateral lower extremities swelling but not really painful.     ROS:  Denies any bleeding complications. Specifically, no frequent epistaxis. No issues with oral mucosal bleeding. Denies any hematuria or blood in stools. Denies any shortness of breath. No chest pain. No cough. No fever.    Medications:  Current Outpatient Medications   Medication     apixaban ANTICOAGULANT (ELIQUIS) 5 MG tablet     B Complex-C-Folic Acid (MAKEDA-KENISHA) TABS     bumetanide (BUMEX) 1 MG tablet     Calcium Carbonate-Vitamin D 500-3.125 MG-MCG TABS     diltiazem ER (DILT-XR) 120 MG 24 hr capsule     folic acid (FOLVITE) 1 MG tablet     lactulose (CHRONULAC) 10 GM/15ML solution     lactulose (CHRONULAC) 10 GM/15ML solution     metoprolol succinate ER (TOPROL XL) 25 MG 24 hr tablet     multivitamin w/minerals (THERA-VIT-M) tablet     omeprazole 20 MG tablet     predniSONE (DELTASONE) 5 MG tablet     spironolactone (ALDACTONE) 50 MG tablet     vancomycin (VANCOCIN) 125 MG capsule     warfarin ANTICOAGULANT (COUMADIN) 1 MG tablet     XIFAXAN 550 MG TABS tablet "     zinc sulfate (ZINCATE) 220 (50 Zn) MG capsule     No current facility-administered medications for this visit.     Allergies:   No Known Allergies    PmHx:  Past Medical History:   Diagnosis Date     Alcoholic cirrhosis of liver with ascites (H) 10/11/2019     Arthritis     RA     C. difficile colitis      Chronic kidney disease      Coronary artery disease     AFib 2016     Diabetes mellitus type 2, uncomplicated (H) 10/11/2019     History of blood transfusion     2016     History of hemochromatosis 10/11/2019     Hypertension      Hypokalemia      Obesity      PAF (paroxysmal atrial fibrillation) (H)      Psoriatic arthritis (H)        Social History:   Deferred.    Family History:  Deferred.    Objective:  Vitals: BP 93/60 (BP Location: Left arm, Patient Position: Sitting, Cuff Size: Adult Regular)   Pulse 80   Temp 97.6  F (36.4  C) (Oral)   Wt 99.7 kg (219 lb 11.2 oz)   SpO2 100%   BMI 33.41 kg/m    Exam:   Rather swollen right hand and rather painful with palpation of his right hand.       Bilateral lower extremity pitting edema noted.       Labs:  Component      Latest Ref Rng & Units 11/22/2022 12/16/2022 12/20/2022 12/21/2022   INR      0.85 - 1.15 1.03 1.20 (H) 1.20 (H) 1.23 (H)     Component      Latest Ref Rng & Units 12/27/2022 1/15/2023 1/23/2023 2/9/2023   INR      0.85 - 1.15 1.07 1.14 1.23 (H) 1.34 (H)     2/25/2023 CBC done at [facility]:  WBC 11.6; Hgb 10.6; PLT 195K    2/14/2023: LFTs done at [facility]:  Alk Phos 154; AST 30; ALT 27; Total bilirubin 1.5; Direct bilirubin 0.7; Total protein 4.8; Albumin 2.0.     Imaging:  Reviewed and are as described above.     Assessment:  In summary, Damion is a 67 year old male with a very complicated past medical history including end stage renal disease on hemodialysis via a left internal jugular vein catheter, history of alcohol associated cirrhosis causing hepatic encephalopathy and esophageal varices S/P TIPS, history of paroxysmal  atrial fibrillation (who was not on anticoagulation therapy prior to Jan 2023), psoriatic arthritis, HTN, ANCA vasculitis, T2DM2, hereditary hemochromatosis, who also has a history of recurrent C.diff colitis, referred to our clinic by the transplant clinic (Dr. Jose Poole) for consultation in regard to a recent finding of a right internal jugular vein thrombosis despite already being on apixaban.     Damion's right leg DVT back on 1/23/2023 was a provoked event as at the time he has numerous hospitalization for recurrent C.diff colitis and encephalopathy.     He also has had numerous superficial thrombophlebitis of both of his upper extremities which were all caused by peripheral IV catheter placements.     His right internal jugular vein thrombosis found on 2/25/2023 at [facility] was age indeterminate. This finding of a internal jugular vein clot might have been chronic as his previous temporary dialysis catheter back in Sept 2022 was placed via his right internal jugular vein. It is conceivable this right internal jugular vein thrombus occurred shortly after the right internal jugular vein line was placed.     Apixaban is almost entirely metabolized hepatically. Thus in theory, given the fact that Damion has cirrhosis, his apixaban blood level might be supra-therapeutic which make him more at risk for bleeding not thrombosis. Dialysis has little effect on apixaban blood level and thus it should not cause him to have subtherapeutic apixaban blood level in theory.     Damion's most concerning symptoms to him today is the complaint of right mid forearm to hand swelling that is rather painful to the point that he losses sleep at night. This swelling and pain is out of proportion of the finding of a superficial right upper extremity thrombophlebitis on 2/25/2023 ultrasound and a nonocclusive thrombus of the right internal jugular vein. On exam (no evidence of ecchymosis) and his clinical history does not  suggest that this swelling and pain is related to a soft tissue hemorrhage. Also on exam today, it is not consistent with cellulitis. Thus the cause of his right mid forearm to right hand swelling and pain remains unclear.     Diagnosis:    Provoked right lower extremity DVT found on 1/23/2023.     Peripheral IV catheter provoked superficial thrombophlebitis of the left upper extremity on 1/2/2023. Resolved.     Peripheral IV catheter provoked superficial thrombophlebitis of the right upper extremity found on 2/25/2023.     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     Right mid forearm to right hand swelling and pain of unclear etiology.     Cirrhosis, being evaluated for liver/kidney transplant.     End stage renal disease on hemodialysis via left chest tunnel catheter since Jan 2023.     History of paroxysmal atrial fibrillation, was not on anticoagulation therapy prior to Jan 2023.     Hereditary hemochromatosis.    History of psoriatic arthritis and rheumatoid arthritis.     Recurrent C.diff colitis.     Plan:  I have a long discussion today with the patient and his wife in regard to our plan and recommendation.     I also discussed this case extensively with Dr. Vamshi Gutierrez, staff hematologist and come up with our following combined recommendation.     We do not feel that this patient has failed apixaban. As mentioned, his finding of a right internal jugular vein nonocclusive thrombus was age indeterminate and it is conceivable that this clot was a chronic thrombus and developed shortly after his temporary dialysis catheter placement via the right internal jugular vein back in Sept 2022 at [facility].     This patient has both cirrhosis and end stage renal disease making the choices of anticoagulation  extremely limited. He is not a good candidate for warfarin as his baseline INR is elevated due to his cirrhosis and thus we are not able to reliably and safely monitor his warfarin. As mentioned, apixaban is almost entirely metabolized hepatically and thus this could cause supra-therapeutic blood level and in turn placing the patient at risk for bleeding complications. Rivaroxaban is 30% renally excreted and 70% hepatically excreted and thus it is also not a good choice for this patient. Dabigatran and enoxaparin are exclusively renally excreted and again not good choices for this patient. The only remaining anticoagulation therapy is unfractionated heparin which pose a challenge on its own as it is difficult to manage as an outpatient with therapeutic subcutaneous injections.     With that said, the patient has already been on apixaban since 1/28/2023 and has had no issues with bleeding. Thus he might be actually therapeutic on his apixaban. His right leg swelling has improved and thus it is likely that apixaban has been effective in treating at least his right leg DVT.     Ultimately, the question is if this patient needs to stay on indefinite anticoagulation therapy. So far all his venous thromboembolism are provoked and thus in theory he might just need only 3-6 months of anticoagulation therapy for all these provoked venous thromboembolic events.     At this time our plan is as follow:     Continue apixaban for now.     I will obtain an apixaban blood level today (3-4 hours after he has taken his apixaban at 10 am this morning). I wll also check CBC, INR and aPTT.     Repeat a right upper extremity ultrasound today or early next week.     Plan to repeat a right lower extremity venous ultrasound after 4/23/2023 (3 months from the time of right leg DVT diagnosis).      Hold off on angiogram (last of the testing needed for the patient to be placed on transplant list), if possible, until after 4/23/2023 to minimize  interruption of his anticoagulation therapy during this first 3 months of treatment.     I have provided him with a prescription of Oxycodone x 15 tablets without refills today.     I also provided him with a prescription for compression sleeves today in attempt for symptomatic management of his right hand swelling and pain.     I will have patient scheduled to see one of our physicians in our clinic when he returns for follow up in April 2023 after his repeat right lower extremity ultrasound.       Bhupinder Yancey PA-C, MPAS  Physician Assistant  [facility].     80 minutes spent on the date of the encounter doing chart review, history and exam, documentation and further activities per the note.    Time IN: 11:05am  Time OUT: 12:20pm     ADDENDUM 3/3/3023 at 15:00:  Right upper extremity venous ultrasound was done today (3/3/2023). Received call from reading radiologist for urgent report:  1.  New nonocclusive thrombus in right axillary vein.   2.  Persistent nonocclusive thrombus in mid right internal jugular vein.   3.  Subcutaneous edema of the right forearm and dorsal hand.    This writer contacted Dr. Vamshi Gutierrez, staff hematologist and further discuss this findings. It is unclear why this patient has new thrombosis despite being on apixaban. Dr. Gutierrez is concern about possible mechanical obstruction leading to his recurrent right upper extremity DVT as in theory with him having cirrhosis, his apixaban level should be at least therapeutic. Although the patient does have quite a bit of swelling and pain of his right hand, this has been going on for the past 2-3 weeks and is stable and is not worsening. Dr. Gutierrez would like to await for the apixaban blood level (which this writer called the special coagulation lab inquiring about the turn around time), which should be back by 3/4/2023. If his apixaban blood level is therapeutic or supra-therapeutic,  then his recurrent right upper extremity DVT is not related to inadequate anticoagulation therapy, it might be related to mechanical compression (I.e. possible thoracic outlet syndrome) and thus he might need imaging to further evaluate and possibly interventional radiology consultation if found.       Bhupinder Yancey PA-C, MPAS  Physician Assistant  [facility].                 Again, thank you for allowing me to participate in the care of your patient.      Sincerely,    Bhupinder Yancey PA-C

## 2023-03-03 NOTE — LETTER
[facility]  [address] [place] [id number]  Main: [phone], Fax: [phone]    Patient seen at: [facility] at [address] [place] [address]    Outpatient Visit Note:    Patient: Damion Quinones  MRN: [id number]  : 1955  LILY: 2023    Reason for visit:  Right leg DVT back on 2023. Recent right upper extremity DVT (right internal jugular vein) on 2023 found at [facility] despite being on anticoagulation therapy with apixaban.     HPI:  Damion is a 67 year old male with a very complicated past medical history including end stage renal disease on hemodialysis via a left internal jugular vein catheter, history of alcohol associated cirrhosis causing hepatic encephalopathy and esophageal varices S/P TIPS, history of paroxysmal atrial fibrillation (who was not on anticoagulation therapy prior to 2023), psoriatic arthritis, HTN, ANCA vasculitis, T2DM2, hereditary hemochromatosis, who also has a history of recurrent C.diff colitis, referred to our clinic by the transplant clinic (Dr. Jose Poole) for consultation in regard to a recent finding of a right internal jugular vein thrombosis despite already being on apixaban.     Very Brief Cirrhosis History Summary:  Damion has cirrhosis secondary to alcohol use and subsequent hepatic encephalopathy, ascites and varices S/P TIPS in 2022 and variceal banding. He has been evaluated by GI as well as liver transplant team for possible future liver transplant.    Very Brief CKD History Summary:  He has Stage 4 CKD secondary to IgA vasculitis vs cirrhosis. His Creatinine back in 2023 was 5.3 and at the time the decision was made for the patient to start hemodialysis. During his admission on 2023, a left sided internal jugular vein hemodialysis catheter was placed. He is also being evaluated for renal transplant.      Very Brief Atrial  "fibrillation History Summary:  He was found to have paroxysmal atrial fibrillation about 8 years ago and was controlled with diltiazem without being on anticoagulation therapy prior to his admission on 1/23/2023.     Very Brief C.diff colitis History Summary:   First found to have C.diff colitis in Dec 2022. Then had recurrence in 1/15/2023 and placed on fidoxamicin by infectious disease consultant.     Very Brief Hemochromatosis History Summary:  Was diagnosed with hemochromatosis by his GI physician back around 2019. He reports that he had undergone phlebotomy x 2 in the past but then loss follow up about this when COVID-19 pandemic occurred. Currently the patient is anemic with most recent hemoglobin at 10.6 on 2/25/2023.     Thrombosis History:  Back in Sept 2022, he was taken off of Humira because of worsening renal function and was placed on burst of prednisone. Then he developed GI bleeding for which he was hospitalized at [facility] and found acute renal failure. At the time, he needed urgent hemodialysis for just one time via a right sided internal jugular vein accessed line. This information is important as the concerning issue today is surrounding the finding of a \"mild localized thin and nonocclusive DVT in the mid right internal jugular vein\" on 2/25/2023.     Damion was hospitalized from 12/16/2022 to 12/21/2022 with C.diff colitis. Then he was again hospitalized from 1/15/2023 to 1/16/2023 for recurrence of C.diff colitis. Despite his creatinine at the time at around 6, he was not on hemodialysis as of yet.     1/2/2023, a left upper extremity ultrasound was done at [facility] for Left upper extremity pain after IV access. This ultrasound showed no DVT but he has a short segment superficial thrombosis of the cephalic vein at the antecubital fossa. Conservatively managed and improved with some pain medications.     Then on 1/23/2023, he returned to the emergency department with confusion " "and was admitted for management of acute toxic metabolic encephalopathy / hepatic encephalopathy. At the time of this admission, he was noted to have right leg edema and thus a right lower extremity venous ultrasound was done on 1/23/2023 showing a nonocclusive DVT within the right femoral vein in the lower thigh, an occlusive DVT in the popliteal vein and occlusive DVT in the peroneal veins. Because of this, he was placed on unfractionated heparin during this hospitalization and subsequently was discharged on 1/28/2023 with apixaban. Inpatient hematology consult team was not consulted for this DVT at the time.     Also during this 1/23/2023 hospitalization, the decision was made for him to start hemodialysis. Thus a left sided chest tunneled hemodialysis catheter was placed by interventional radiology via left internal jugular vein access on 1/26/2023.     2/13/2023, he presented to the emergency department after a run of his dialysis with altered mental status. Initially there was concern that he is hypotensive but his BP was normal upon arrival. He was given a bolus of fluid at 500 mL and was discharged home. IV placed over right upper extremity.     2/17/2023, he was seen by Tito Rodriges PA-C of family medicine and was found to have right upper extremity cellulitis, given cephalexin x 10 days.     2/21/2023, he was seen by Dr. Tito Schmidt of [facility] in the office as his right upper extremity pain and swelling did not improve. Added another antibiotics.     2/25/2023, he presented to the emergency department at [facility] with ongoing right arm pain and swelling. A right upper extremity venous ultrasound was done and he was found to have a nonocclusive DVT in the mid right internal jugular vein which was described in the report as \"mild localized thin and nonocclusive DVT in the mid right internal jugular vein\". As well as a superficial thrombophlebitis involving the right cephalic vein in the region " of the antecubital fossa. He was discharged and remained on apixaban.     Because of this finding of right internal jugular venous DVT, his originally scheduled angiogram with right heart cath that is scheduled for 3/2/2023 was cancelled. There was some mentioned about switching him to warfarin but ultimately the decision was to keep him on apixaban and refer the patient to this clinic for consultation.      Today, he continues to complaint of sever pain over his right hand and also quite swollen. He reports that the pain is keeping him up at night. He also complaint of bilateral lower extremities swelling but not really painful.     ROS:  Denies any bleeding complications. Specifically, no frequent epistaxis. No issues with oral mucosal bleeding. Denies any hematuria or blood in stools. Denies any shortness of breath. No chest pain. No cough. No fever.    Medications:  Current Outpatient Medications   Medication     apixaban ANTICOAGULANT (ELIQUIS) 5 MG tablet     B Complex-C-Folic Acid (MAKEDA-KENISHA) TABS     bumetanide (BUMEX) 1 MG tablet     Calcium Carbonate-Vitamin D 500-3.125 MG-MCG TABS     diltiazem ER (DILT-XR) 120 MG 24 hr capsule     folic acid (FOLVITE) 1 MG tablet     lactulose (CHRONULAC) 10 GM/15ML solution     lactulose (CHRONULAC) 10 GM/15ML solution     metoprolol succinate ER (TOPROL XL) 25 MG 24 hr tablet     multivitamin w/minerals (THERA-VIT-M) tablet     omeprazole 20 MG tablet     predniSONE (DELTASONE) 5 MG tablet     spironolactone (ALDACTONE) 50 MG tablet     vancomycin (VANCOCIN) 125 MG capsule     warfarin ANTICOAGULANT (COUMADIN) 1 MG tablet     XIFAXAN 550 MG TABS tablet     zinc sulfate (ZINCATE) 220 (50 Zn) MG capsule     No current facility-administered medications for this visit.     Allergies:   No Known Allergies    PmHx:  Past Medical History:   Diagnosis Date     Alcoholic cirrhosis of liver with ascites (H) 10/11/2019     Arthritis     RA     C. difficile colitis      Chronic  kidney disease      Coronary artery disease     AFib 2016     Diabetes mellitus type 2, uncomplicated (H) 10/11/2019     History of blood transfusion     2016     History of hemochromatosis 10/11/2019     Hypertension      Hypokalemia      Obesity      PAF (paroxysmal atrial fibrillation) (H)      Psoriatic arthritis (H)        Social History:   Deferred.    Family History:  Deferred.    Objective:  Vitals: BP 93/60 (BP Location: Left arm, Patient Position: Sitting, Cuff Size: Adult Regular)   Pulse 80   Temp 97.6  F (36.4  C) (Oral)   Wt 99.7 kg (219 lb 11.2 oz)   SpO2 100%   BMI 33.41 kg/m    Exam:   Rather swollen right hand and rather painful with palpation of his right hand.       Bilateral lower extremity pitting edema noted.       Labs:  Component      Latest Ref Rng & Units 11/22/2022 12/16/2022 12/20/2022 12/21/2022   INR      0.85 - 1.15 1.03 1.20 (H) 1.20 (H) 1.23 (H)     Component      Latest Ref Rng & Units 12/27/2022 1/15/2023 1/23/2023 2/9/2023   INR      0.85 - 1.15 1.07 1.14 1.23 (H) 1.34 (H)     2/25/2023 CBC done at [facility]:  WBC 11.6; Hgb 10.6; PLT 195K    2/14/2023: LFTs done at [facility]:  Alk Phos 154; AST 30; ALT 27; Total bilirubin 1.5; Direct bilirubin 0.7; Total protein 4.8; Albumin 2.0.     Imaging:  Reviewed and are as described above.     Assessment:  In summary, Damion is a 67 year old male with a very complicated past medical history including end stage renal disease on hemodialysis via a left internal jugular vein catheter, history of alcohol associated cirrhosis causing hepatic encephalopathy and esophageal varices S/P TIPS, history of paroxysmal atrial fibrillation (who was not on anticoagulation therapy prior to Jan 2023), psoriatic arthritis, HTN, ANCA vasculitis, T2DM2, hereditary hemochromatosis, who also has a history of recurrent C.diff colitis, referred to our clinic by the transplant clinic (Dr. Jose Poole) for consultation in regard to a recent finding of  a right internal jugular vein thrombosis despite already being on apixaban.     Damion's right leg DVT back on 1/23/2023 was a provoked event as at the time he has numerous hospitalization for recurrent C.diff colitis and encephalopathy.     He also has had numerous superficial thrombophlebitis of both of his upper extremities which were all caused by peripheral IV catheter placements.     His right internal jugular vein thrombosis found on 2/25/2023 at [facility] was age indeterminate. This finding of a internal jugular vein clot might have been chronic as his previous temporary dialysis catheter back in Sept 2022 was placed via his right internal jugular vein. It is conceivable this right internal jugular vein thrombus occurred shortly after the right internal jugular vein line was placed.     Apixaban is almost entirely metabolized hepatically. Thus in theory, given the fact that Damion has cirrhosis, his apixaban blood level might be supra-therapeutic which make him more at risk for bleeding not thrombosis. Dialysis has little effect on apixaban blood level and thus it should not cause him to have subtherapeutic apixaban blood level in theory.     Damion's most concerning symptoms to him today is the complaint of right mid forearm to hand swelling that is rather painful to the point that he losses sleep at night. This swelling and pain is out of proportion of the finding of a superficial right upper extremity thrombophlebitis on 2/25/2023 ultrasound and a nonocclusive thrombus of the right internal jugular vein. On exam (no evidence of ecchymosis) and his clinical history does not suggest that this swelling and pain is related to a soft tissue hemorrhage. Also on exam today, it is not consistent with cellulitis. Thus the cause of his right mid forearm to right hand swelling and pain remains unclear.     Diagnosis:    Provoked right lower extremity DVT found on 1/23/2023.     Peripheral IV catheter  provoked superficial thrombophlebitis of the left upper extremity on 1/2/2023. Resolved.     Peripheral IV catheter provoked superficial thrombophlebitis of the right upper extremity found on 2/25/2023.     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     Right mid forearm to right hand swelling and pain of unclear etiology.     Cirrhosis, being evaluated for liver/kidney transplant.     End stage renal disease on hemodialysis via left chest tunnel catheter since Jan 2023.     History of paroxysmal atrial fibrillation, was not on anticoagulation therapy prior to Jan 2023.     Hereditary hemochromatosis.    History of psoriatic arthritis and rheumatoid arthritis.     Recurrent C.diff colitis.     Plan:  I have a long discussion today with the patient and his wife in regard to our plan and recommendation.     I also discussed this case extensively with Dr. Vamshi Gutierrez, staff hematologist and come up with our following combined recommendation.     We do not feel that this patient has failed apixaban. As mentioned, his finding of a right internal jugular vein nonocclusive thrombus was age indeterminate and it is conceivable that this clot was a chronic thrombus and developed shortly after his temporary dialysis catheter placement via the right internal jugular vein back in Sept 2022 at [facility].     This patient has both cirrhosis and end stage renal disease making the choices of anticoagulation extremely limited. He is not a good candidate for warfarin as his baseline INR is elevated due to his cirrhosis and thus we are not able to reliably and safely monitor his warfarin. As mentioned, apixaban is almost entirely metabolized hepatically and thus this could cause supra-therapeutic blood level and in turn placing the  patient at risk for bleeding complications. Rivaroxaban is 30% renally excreted and 70% hepatically excreted and thus it is also not a good choice for this patient. Dabigatran and enoxaparin are exclusively renally excreted and again not good choices for this patient. The only remaining anticoagulation therapy is unfractionated heparin which pose a challenge on its own as it is difficult to manage as an outpatient with therapeutic subcutaneous injections.     With that said, the patient has already been on apixaban since 1/28/2023 and has had no issues with bleeding. Thus he might be actually therapeutic on his apixaban. His right leg swelling has improved and thus it is likely that apixaban has been effective in treating at least his right leg DVT.     Ultimately, the question is if this patient needs to stay on indefinite anticoagulation therapy. So far all his venous thromboembolism are provoked and thus in theory he might just need only 3-6 months of anticoagulation therapy for all these provoked venous thromboembolic events.     At this time our plan is as follow:     Continue apixaban for now.     I will obtain an apixaban blood level today (3-4 hours after he has taken his apixaban at 10 am this morning). I wll also check CBC, INR and aPTT.     Repeat a right upper extremity ultrasound today or early next week.     Plan to repeat a right lower extremity venous ultrasound after 4/23/2023 (3 months from the time of right leg DVT diagnosis).      Hold off on angiogram (last of the testing needed for the patient to be placed on transplant list), if possible, until after 4/23/2023 to minimize interruption of his anticoagulation therapy during this first 3 months of treatment.     I have provided him with a prescription of Oxycodone x 15 tablets without refills today.     I also provided him with a prescription for compression sleeves today in attempt for symptomatic management of his right hand swelling and pain.      I will have patient scheduled to see one of our physicians in our clinic when he returns for follow up in April 2023 after his repeat right lower extremity ultrasound.       Bhupinder Yancey PA-C, MPAS  Physician Assistant  [facility].     80 minutes spent on the date of the encounter doing chart review, history and exam, documentation and further activities per the note.    Time IN: 11:05am  Time OUT: 12:20pm     ADDENDUM 3/3/3023 at 15:00:  Right upper extremity venous ultrasound was done today (3/3/2023). Received call from reading radiologist for urgent report:  1.  New nonocclusive thrombus in right axillary vein.   2.  Persistent nonocclusive thrombus in mid right internal jugular vein.   3.  Subcutaneous edema of the right forearm and dorsal hand.    This writer contacted Dr. Vamshi Gutierrez, staff hematologist and further discuss this findings. It is unclear why this patient has new thrombosis despite being on apixaban. Dr. Gutierrez is concern about possible mechanical obstruction leading to his recurrent right upper extremity DVT as in theory with him having cirrhosis, his apixaban level should be at least therapeutic. Although the patient does have quite a bit of swelling and pain of his right hand, this has been going on for the past 2-3 weeks and is stable and is not worsening. Dr. Gutierrez would like to await for the apixaban blood level (which this writer called the special coagulation lab inquiring about the turn around time), which should be back by 3/4/2023. If his apixaban blood level is therapeutic or supra-therapeutic, then his recurrent right upper extremity DVT is not related to inadequate anticoagulation therapy, it might be related to mechanical compression (I.e. possible thoracic outlet syndrome) and thus he might need imaging to further evaluate and possibly interventional radiology consultation if found.       Bhupinder Yancey PA-C,  MPAS  Physician Assistant  [facility].

## 2023-03-04 LAB — APIXABAN PPP CHRO-MCNC: 287 NG/ML

## 2023-03-06 ENCOUNTER — HOSPITAL ENCOUNTER (OUTPATIENT)
Dept: PHYSICAL THERAPY | Facility: REHABILITATION | Age: 68
Discharge: HOME | End: 2023-03-06
Payer: COMMERCIAL

## 2023-03-06 ENCOUNTER — TELEPHONE (OUTPATIENT)
Dept: HEMATOLOGY | Facility: CLINIC | Age: 68
End: 2023-03-06

## 2023-03-06 DIAGNOSIS — Z74.09 IMPAIRED FUNCTIONAL MOBILITY, BALANCE, GAIT, AND ENDURANCE: Primary | ICD-10-CM

## 2023-03-06 DIAGNOSIS — Z99.2 ESRD (END STAGE RENAL DISEASE) ON DIALYSIS (H): ICD-10-CM

## 2023-03-06 DIAGNOSIS — I82.C11 INTERNAL JUGULAR VEIN THROMBOSIS, RIGHT (H): Primary | ICD-10-CM

## 2023-03-06 DIAGNOSIS — N18.6 ESRD (END STAGE RENAL DISEASE) ON DIALYSIS (H): ICD-10-CM

## 2023-03-06 DIAGNOSIS — E83.110 HEREDITARY HEMOCHROMATOSIS (H): ICD-10-CM

## 2023-03-06 DIAGNOSIS — I82.4Y1 DEEP VEIN THROMBOSIS (DVT) OF PROXIMAL VEIN OF RIGHT LOWER EXTREMITY, UNSPECIFIED CHRONICITY (H): ICD-10-CM

## 2023-03-06 DIAGNOSIS — K70.31 ALCOHOLIC CIRRHOSIS OF LIVER WITH ASCITES (H): ICD-10-CM

## 2023-03-06 DIAGNOSIS — K72.10 END-STAGE LIVER DISEASE (H): ICD-10-CM

## 2023-03-06 DIAGNOSIS — M79.89 SWELLING OF LIMB: Primary | ICD-10-CM

## 2023-03-06 DIAGNOSIS — L40.50 PSORIATIC ARTHRITIS (H): ICD-10-CM

## 2023-03-06 PROCEDURE — 97750 PHYSICAL PERFORMANCE TEST: CPT | Mod: GP

## 2023-03-06 PROCEDURE — 97110 THERAPEUTIC EXERCISES: CPT | Mod: GP

## 2023-03-06 NOTE — TELEPHONE ENCOUNTER
[facility]  [facility]  [facility], [address], [place] [address]  Main: [phone], Fax: [phone]    Telephone Note:    Patient: Damion Quinones  MRN: [id number]  : 1955  Date of this note written: 2023    This writer called the patient and spoke to his wife, Apoorva, on 3/6/2023 at 09:40am.     Apoorva reports that over the weekend, they contacted their rheumatologist who started the patient on prednisone once again. Apoorva reports that his right hand / forearm swelling has slightly improved as well as his pain also has improved.    I communicated the repeat right upper extremity ultrasound report done on 3/3/2023 showing no change of the nonocclusive right internal jugular vein thrombus but there is a new acute nonocclusive axillary vein thrombosis. The superficial thrombophlebitis has resolved. I also report to Apoorva that his apixaban level done on 3/3/2023 was within the therapeutic range and thus this shows that he is adequately anticoagulated.     This writer spoke with Dr. Vamshi Gutierrez, he is recommending referral to interventional radiology for consultation in regard to possible more central obstruction or mechanical cause of his recurrent right upper extremity venous thrombosis despite adequate anticoagulation therapy.     The patient (in the background of this telephone visit) and Apoorva are both in agreement with interventional radiology consultation.       Bhupinder Yancey PA-C, MPAS  Physician Assistant  [facility].

## 2023-03-09 ENCOUNTER — OFFICE VISIT (OUTPATIENT)
Dept: FAMILY MEDICINE | Facility: CLINIC | Age: 68
End: 2023-03-09
Payer: COMMERCIAL

## 2023-03-09 VITALS
DIASTOLIC BLOOD PRESSURE: 80 MMHG | RESPIRATION RATE: 14 BRPM | TEMPERATURE: 97.3 F | BODY MASS INDEX: 33 KG/M2 | HEART RATE: 90 BPM | SYSTOLIC BLOOD PRESSURE: 118 MMHG | WEIGHT: 217 LBS | OXYGEN SATURATION: 99 %

## 2023-03-09 DIAGNOSIS — N18.6 ESRD (END STAGE RENAL DISEASE) ON DIALYSIS (H): ICD-10-CM

## 2023-03-09 DIAGNOSIS — F10.21 ALCOHOLISM IN REMISSION (H): ICD-10-CM

## 2023-03-09 DIAGNOSIS — I82.401 ACUTE DEEP VEIN THROMBOSIS (DVT) OF RIGHT LOWER EXTREMITY, UNSPECIFIED VEIN (H): ICD-10-CM

## 2023-03-09 DIAGNOSIS — M1A.9XX1 TOPHACEOUS GOUT: ICD-10-CM

## 2023-03-09 DIAGNOSIS — K72.10 END-STAGE LIVER DISEASE (H): Primary | ICD-10-CM

## 2023-03-09 DIAGNOSIS — A04.72 COLITIS DUE TO CLOSTRIDIUM DIFFICILE: ICD-10-CM

## 2023-03-09 DIAGNOSIS — Z99.2 ESRD (END STAGE RENAL DISEASE) ON DIALYSIS (H): ICD-10-CM

## 2023-03-09 PROBLEM — F10.99 ALCOHOL USE, UNSPECIFIED WITH UNSPECIFIED ALCOHOL-INDUCED DISORDER (H): Status: ACTIVE | Noted: 2023-03-09

## 2023-03-09 PROCEDURE — 99214 OFFICE O/P EST MOD 30 MIN: CPT | Performed by: FAMILY MEDICINE

## 2023-03-09 ASSESSMENT — ENCOUNTER SYMPTOMS
CONSTITUTIONAL NEGATIVE: 1
GASTROINTESTINAL NEGATIVE: 1
ALLERGIC/IMMUNOLOGIC NEGATIVE: 1
CARDIOVASCULAR NEGATIVE: 1
EYES NEGATIVE: 1
HEMATOLOGIC/LYMPHATIC NEGATIVE: 1
ENDOCRINE NEGATIVE: 1
MUSCULOSKELETAL NEGATIVE: 1
PSYCHIATRIC NEGATIVE: 1
RESPIRATORY NEGATIVE: 1
NEUROLOGICAL NEGATIVE: 1

## 2023-03-09 NOTE — PROGRESS NOTES
"  Assessment & Plan   Problem List Items Addressed This Visit     End-stage liver disease (H) - Primary    ESRD (end stage renal disease) on dialysis (H)    Tophaceous gout    Alcohol use, unspecified with unspecified alcohol-induced disorder (H)    Acute deep vein thrombosis (DVT) of right lower extremity, unspecified vein (H)    Relevant Medications    apixaban ANTICOAGULANT (ELIQUIS) 5 MG tablet   Other Visit Diagnoses     Colitis due to Clostridium difficile               1. End stage liver disease with chronic elevation in INR, plan is to pursue liver/kidney transplant but still awaiting cardiac workup on hold due to new thromboembolisms. Discussed concerns from hematology regarding use of warfarin given liver disease. Will await workup in April of thromboembolism. If still needs anticoagulation, will need coordination between hematology and cardiology to determine if can do bridging and complete angiogram. Remains abstinent from alcohol.  2. End stage renal disease, dialysis is going well, would like to pursue port and consider home dialysis but again needs to determine plan for anticoagulation.   3. Feeling much better since starting on vancomycin for clostridium difficle, will continue the course until April as ordered. If worsens, will need to consider fecal transplant.  4. Gout is improving on prednisone, no changes at this time, will see rheumatology again in April for follow up, considering further treatment if not controlled  5. Thromboembolism, patient with multiple known clots in leg, jugular vein, and now in arm, will have CT tomorrow to rule out clotting in chest           MED REC REQUIRED  Post Medication Reconciliation Status: discharge medications reconciled, continue medications without change  BMI:   Estimated body mass index is 33 kg/m  as calculated from the following:    Height as of 3/1/23: 1.727 m (5' 7.99\").    Weight as of this encounter: 98.4 kg (217 lb).       No follow-ups on " file.    Gary Serrano MD  [facility]    Subjective   Damion is a 67 year old accompanied by his spouse, presenting for the following health issues:  Hospital F/U    Hematology follow up discussed, reviewed notes together and reviewed reason for holding on transition to warfarin. Having CT tomorrow.  Follow up ultrasound planned in April 24th. At this time staying on eliquis as they have concerns about warfarin given renal and liver disease.   Rheumatology follow up discussed, had labs today. Reviewed sed rate which has improved. Uric acid is pending. Follow up in 6 weeks and again in June, waiting to see if uric acid improves on dialysis, Rituxin on hold  Last week weak, fatigued, shortness of breath. Saw ID and started on vancomycin for c diff and prednisone. Wife has noticed significant improvement in the past week. Will see ID again in April for reevaluation after completing vancomycin.  Cardiology follow up discussed. Angiogram on hold due to blood clot treatment. Will need done before further plans for transplant.  Dialysis is going well, still awaiting port placement  Transplant workup on hold as noted.       History of Present Illness       CKD: He is not using over the counter pain medicine.     He eats 2-3 servings of fruits and vegetables daily.He consumes 1 sweetened beverage(s) daily.He exercises with enough effort to increase his heart rate 10 to 19 minutes per day.  He exercises with enough effort to increase his heart rate 4 days per week.   He is taking medications regularly.         How are you feeling today? Much better  In the past 24 hours have you had shortness of breath when speaking, walking, or climbing stairs? I don't have breathing problems  Do you have a cough? I don't have a cough  When is the last time you had a fever greater than 100? none  Are you having any other symptoms? None   Do you have any other stressors you would like to discuss with your  provider? No                  Review of Systems   Constitutional: Negative.    HENT: Negative.    Eyes: Negative.    Respiratory: Negative.    Cardiovascular: Negative.    Gastrointestinal: Negative.    Endocrine: Negative.    Genitourinary: Negative.    Musculoskeletal: Negative.    Skin: Negative.    Allergic/Immunologic: Negative.    Neurological: Negative.    Hematological: Negative.    Psychiatric/Behavioral: Negative.             Objective    /80   Pulse 90   Temp 97.3  F (36.3  C)   Resp 14   Wt 98.4 kg (217 lb)   SpO2 99%   BMI 33.00 kg/m    Body mass index is 33 kg/m .  Physical Exam   GENERAL: healthy, alert and no distress  EYES: Eyes grossly normal to inspection, PERRL and conjunctivae and sclerae normal  RESP: lungs clear to auscultation - no rales, rhonchi or wheezes  CV: regular rate and rhythm, normal S1 S2, no S3 or S4, no murmur, click or rub, no peripheral edema and peripheral pulses strong

## 2023-03-10 ENCOUNTER — ANCILLARY PROCEDURE (OUTPATIENT)
Dept: CT IMAGING | Facility: CLINIC | Age: 68
End: 2023-03-10
Attending: RADIOLOGY
Payer: COMMERCIAL

## 2023-03-10 DIAGNOSIS — M79.89 SWELLING OF LIMB: ICD-10-CM

## 2023-03-10 PROCEDURE — 71275 CT ANGIOGRAPHY CHEST: CPT | Mod: GC | Performed by: RADIOLOGY

## 2023-03-10 RX ORDER — IOPAMIDOL 755 MG/ML
75 INJECTION, SOLUTION INTRAVASCULAR ONCE
Status: COMPLETED | OUTPATIENT
Start: 2023-03-10 | End: 2023-03-10

## 2023-03-10 RX ADMIN — IOPAMIDOL 75 ML: 755 INJECTION, SOLUTION INTRAVASCULAR at 10:48

## 2023-03-14 ENCOUNTER — HOSPITAL ENCOUNTER (OUTPATIENT)
Dept: PHYSICAL THERAPY | Facility: REHABILITATION | Age: 68
Discharge: HOME | End: 2023-03-14
Payer: COMMERCIAL

## 2023-03-14 DIAGNOSIS — Z74.09 IMPAIRED FUNCTIONAL MOBILITY, BALANCE, GAIT, AND ENDURANCE: Primary | ICD-10-CM

## 2023-03-14 PROCEDURE — 97110 THERAPEUTIC EXERCISES: CPT | Mod: GP

## 2023-03-15 ENCOUNTER — VIRTUAL VISIT (OUTPATIENT)
Dept: VASCULAR SURGERY | Facility: CLINIC | Age: 68
End: 2023-03-15
Payer: COMMERCIAL

## 2023-03-15 DIAGNOSIS — I82.C11 INTERNAL JUGULAR VEIN THROMBOSIS, RIGHT (H): ICD-10-CM

## 2023-03-15 DIAGNOSIS — L40.50 PSORIATIC ARTHRITIS (H): ICD-10-CM

## 2023-03-15 DIAGNOSIS — Z99.2 ESRD (END STAGE RENAL DISEASE) ON DIALYSIS (H): ICD-10-CM

## 2023-03-15 DIAGNOSIS — K70.31 ALCOHOLIC CIRRHOSIS OF LIVER WITH ASCITES (H): ICD-10-CM

## 2023-03-15 DIAGNOSIS — I82.4Y1 DEEP VEIN THROMBOSIS (DVT) OF PROXIMAL VEIN OF RIGHT LOWER EXTREMITY, UNSPECIFIED CHRONICITY (H): ICD-10-CM

## 2023-03-15 DIAGNOSIS — N18.6 ESRD (END STAGE RENAL DISEASE) ON DIALYSIS (H): ICD-10-CM

## 2023-03-15 DIAGNOSIS — E83.110 HEREDITARY HEMOCHROMATOSIS (H): ICD-10-CM

## 2023-03-15 DIAGNOSIS — K72.10 END-STAGE LIVER DISEASE (H): ICD-10-CM

## 2023-03-15 PROCEDURE — 99203 OFFICE O/P NEW LOW 30 MIN: CPT | Mod: VID | Performed by: RADIOLOGY

## 2023-03-15 NOTE — LETTER
"3/15/2023       RE: Damion Quinones   [address] [facility] [address]     Dear Colleague,    Thank you for referring your patient, Damion Quinones, to the [facility] [place] [facility]. Please see a copy of my visit note below.    [facility]      Type of Visit: Virtual: [facility]    Patient is here for a new visit to discuss DVT/PE.Does not wear compression Stockings     Vitals - Patient Reported  Weight (Patient Reported): 95.3 kg (210 lb)  Height (Patient Reported): 172.7 cm (5' 8\")  BMI (Based on Pt Reported Ht/Wt): 31.93  Pain Score: No Pain (0)    Questions patient would like addressed today are: Patient verbalized no questions/concerns, this has been communicated to the provider.     Refills are needed: No    How would you like to obtain your AVS? Sara Nova    Mr. Quinones is a 66 yo male who is referred with a history of relatively new non-occlusive right internal jugular and right axillary venous thrombus with associated right arm swelling.    This was diagnosed on 2/25/23 at [facility] despite already taking abixaban. He has a complex medical history including cirrhosis s/p TIPS, psoriasis, HTN, vasculitis, and DM2.    He was initially referred to hematology by hepatologist after the right internal jugular thrombus was recently diagnosed.     His wife is also present for the discussion. Since being started on prednisone for gout flare, his right arm swelling is essentially completely resolved. He denies any other pain or other issues in his right arm or head or neck.     He has not missed any anticoagulation doses.    PE:  No acute distress    I reviewed the CT venogram of his chest and right arm which reveals patent central veins with possible mild medial left subclavian vein stenosis.    A/P:    66 yo with non-occlusive right internal jugular and axillary vein thrombus which appears to have developed on " anticoagulation, concern for possible central venous obstruction.    His recent right arm and upper extremity swelling has resolved and he has no residual concerns. No neck or head symptoms from his internal jugular thrombus.     Given the improved symptoms, I would like to repeat his ultrasound as it appears his arm symptoms were likely related to a gout or other flare.     If his right axillary vein thrombus is resolved and the essentially normal CT venogram, I don't think a venogram is necessary right now. If on the follow-up ultrasound the thrombus persists or is somehow progressed, we can proceed to a venogram with possible intervention.     Mr. Quinones and his wife are agreeable to the plan.       Again, thank you for allowing me to participate in the care of your patient.      Sincerely,    Gustabo Brady MD

## 2023-03-15 NOTE — PROCEDURES
Swelling pretty much gone, ended up being gout. Didn't think it was gout. Was put on gout then things settled down.     Get follow-up ultrasound. If clot cleared no venogram recommended. If there is persistent clot we will perform right arm venogram with possible inteverntion.

## 2023-03-15 NOTE — PROGRESS NOTES
"[facility]      Type of Visit: Virtual: [facility]    Patient is here for a new visit to discuss DVT/PE.Does not wear compression Stockings     Vitals - Patient Reported  Weight (Patient Reported): 95.3 kg (210 lb)  Height (Patient Reported): 172.7 cm (5' 8\")  BMI (Based on Pt Reported Ht/Wt): 31.93  Pain Score: No Pain (0)    Questions patient would like addressed today are: Patient verbalized no questions/concerns, this has been communicated to the provider.     Refills are needed: No    How would you like to obtain your AVS? Sara Nova  "

## 2023-03-15 NOTE — NURSING NOTE
Chief Complaint   Patient presents with     Consult     HX DVT       Patient confirms medications and allergies are accurate via patients echeck in completion, and or denies any changes since last reviewed/verified.       Shivani Nova, Virtual Facilitator    Is the patient currently in the state of [place]? YES    Visit mode:VIDEO    If the visit is dropped, the patient can be reconnected by: VIDEO VISIT: Text to cell phone: [phone]    Will anyone else be joining the visit?Yes Nabila      How would you like to obtain your AVS? MyChart    Are changes needed to the allergy or medication list? NO    Reason for visit: consult DVT

## 2023-03-16 ENCOUNTER — DOCUMENTATION ONLY (OUTPATIENT)
Dept: ANTICOAGULATION | Facility: CLINIC | Age: 68
End: 2023-03-16
Payer: COMMERCIAL

## 2023-03-16 DIAGNOSIS — I82.90 DEEP VEIN THROMBOSIS (DVT) OF NON-EXTREMITY VEIN, UNSPECIFIED CHRONICITY: Primary | ICD-10-CM

## 2023-03-16 NOTE — PROGRESS NOTES
ANTICOAGULATION  MANAGEMENT    Damion Quinones is being discharged from the [facility] Program ([facility]).    Reason for discharge: Did not start Warfarfin.  Continues on Apixaban.  [facility] can work with the patient if warfarin is needed in the future; would just need a new consult at that time  Anticoagulation episode resolved, [facility] referral closed and Standing order discontinued    If patient needs warfarin management in the future, please send a new referral    Aleksandar Estrada RN

## 2023-03-21 ENCOUNTER — ANCILLARY PROCEDURE (OUTPATIENT)
Dept: ULTRASOUND IMAGING | Facility: CLINIC | Age: 68
End: 2023-03-21
Attending: RADIOLOGY
Payer: COMMERCIAL

## 2023-03-21 ENCOUNTER — OFFICE VISIT (OUTPATIENT)
Dept: GASTROENTEROLOGY | Facility: CLINIC | Age: 68
End: 2023-03-21
Attending: INTERNAL MEDICINE
Payer: COMMERCIAL

## 2023-03-21 VITALS
BODY MASS INDEX: 33.78 KG/M2 | HEART RATE: 79 BPM | HEIGHT: 68 IN | DIASTOLIC BLOOD PRESSURE: 92 MMHG | WEIGHT: 222.9 LBS | SYSTOLIC BLOOD PRESSURE: 130 MMHG | OXYGEN SATURATION: 100 %

## 2023-03-21 DIAGNOSIS — K70.31 ALCOHOLIC CIRRHOSIS OF LIVER WITH ASCITES (H): Primary | ICD-10-CM

## 2023-03-21 DIAGNOSIS — Z23 NEED FOR VACCINATION: ICD-10-CM

## 2023-03-21 DIAGNOSIS — N18.6 STAGE 5 CHRONIC KIDNEY DISEASE ON CHRONIC DIALYSIS (H): ICD-10-CM

## 2023-03-21 DIAGNOSIS — Z99.2 STAGE 5 CHRONIC KIDNEY DISEASE ON CHRONIC DIALYSIS (H): ICD-10-CM

## 2023-03-21 DIAGNOSIS — I82.C11 INTERNAL JUGULAR VEIN THROMBOSIS, RIGHT (H): ICD-10-CM

## 2023-03-21 PROCEDURE — 93971 EXTREMITY STUDY: CPT | Mod: RT | Performed by: RADIOLOGY

## 2023-03-21 PROCEDURE — G0009 ADMIN PNEUMOCOCCAL VACCINE: HCPCS | Performed by: INTERNAL MEDICINE

## 2023-03-21 PROCEDURE — 250N000011 HC RX IP 250 OP 636: Performed by: INTERNAL MEDICINE

## 2023-03-21 PROCEDURE — 99213 OFFICE O/P EST LOW 20 MIN: CPT | Mod: 25 | Performed by: INTERNAL MEDICINE

## 2023-03-21 PROCEDURE — 99215 OFFICE O/P EST HI 40 MIN: CPT | Performed by: INTERNAL MEDICINE

## 2023-03-21 PROCEDURE — 90670 PCV13 VACCINE IM: CPT | Performed by: INTERNAL MEDICINE

## 2023-03-21 RX ADMIN — PNEUMOCOCCAL 13-VALENT CONJUGATE VACCINE 0.5 ML: 2.2; 2.2; 2.2; 2.2; 2.2; 4.4; 2.2; 2.2; 2.2; 2.2; 2.2; 2.2; 2.2 INJECTION, SUSPENSION INTRAMUSCULAR at 08:32

## 2023-03-21 ASSESSMENT — PAIN SCALES - GENERAL: PAINLEVEL: NO PAIN (0)

## 2023-03-21 NOTE — PROGRESS NOTES
"HISTORY OF PRESENT ILLNESS:  I had the pleasure of seeing Damion Quinones for followup in the Liver Clinic at the [facility] on 03/21/2023.  Mr. Quinones returns for followup in the process of being evaluated for combined liver/kidney transplantation.  His major issue at this point in time is that he has had a number of venous thromboses, and he does need a coronary angiogram.  The hematologists are trying to sort out when exactly he could be switched to heparin in order to undergo the procedure.    He otherwise feels well.  He denies any abdominal pain, itching, skin rash or fatigue.  He denies any increased abdominal girth or lower extremity edema.  He denies any fevers or chills, cough or shortness of breath.  He denies any nausea or vomiting, diarrhea or constipation.  His appetite has been good, and for the most part, his weight has been stable.    He is tolerating dialysis well.  He has not had any blood pressure issues on dialysis.    Current Outpatient Medications   Medication     apixaban ANTICOAGULANT (ELIQUIS) 5 MG tablet     bumetanide (BUMEX) 1 MG tablet     Calcium Carbonate-Vitamin D 500-3.125 MG-MCG TABS     folic acid (FOLVITE) 1 MG tablet     metoprolol succinate ER (TOPROL XL) 25 MG 24 hr tablet     omeprazole 20 MG tablet     oxyCODONE (ROXICODONE) 5 MG tablet     predniSONE (DELTASONE) 5 MG tablet     spironolactone (ALDACTONE) 50 MG tablet     vancomycin (VANCOCIN) 125 MG capsule     XIFAXAN 550 MG TABS tablet     zinc sulfate (ZINCATE) 220 (50 Zn) MG capsule     No current facility-administered medications for this visit.     BP (!) 130/92 (BP Location: Right arm, Patient Position: Sitting, Cuff Size: Adult Large)   Pulse 79   Ht 1.727 m (5' 7.99\")   Wt 101.1 kg (222 lb 14.4 oz)   SpO2 100%   BMI 33.90 kg/m      PHYSICAL EXAMINATION:    GENERAL:  He looks well.  HEENT:  No scleral icterus or temporal muscle wasting.  CHEST:  Clear.  ABDOMEN:  Umbilical hernia that is " easily reduced and is moderate sized.  It is difficult to appreciate whether ascites is present.  His liver is 10 cm in span without left lobe enlargement.  No spleen tip is palpable.  EXTREMITIES:  No edema.  SKIN:  No stigmata of chronic liver disease.  NEUROLOGIC:  No asterixis.    IMPRESSION:  Mr. Quinones has cirrhosis and end-stage renal disease on dialysis, being evaluated for liver and kidney transplantation.  As mentioned, the only thing that is standing in his way at the present time is the coronary angiogram.  I have tried to work with the hematologist and cardiologist to get him scheduled and then get him switched over to heparin so he can have the procedure done.  They may want to keep him on Coumadin afterwards, which might be a little bit safer for him.  He will get a Prevnar 13 vaccine, which he is due for today, and otherwise, I will see him back in the clinic in 3 months.    I did spend total of 40 minutes (on the date of the encounter), including 30 minutes of face-to-face clinic time including counseling. The rest of the time was spent in documentation and review of records.     Thank you very much for allowing me to participate in the care of this patient.  If you have any questions regarding recommendations, please do not hesitate to contact me.         Jose Poole MD      Professor of Medicine  [employer]      Executive Medical Director, Solid Organ Transplant Program  [facility]

## 2023-03-21 NOTE — LETTER
3/21/2023         RE: Damion Quinones   [address] [facility] [address]        Dear Colleague,    Thank you for referring your patient, Damion Quinones, to the [facility]PAT[facility] [place]. Please see a copy of my visit note below.    HISTORY OF PRESENT ILLNESS:  I had the pleasure of seeing Damion Quinones for followup in the Liver Clinic at the [facility] on 03/21/2023.  Mr. Quinones returns for followup in the process of being evaluated for combined liver/kidney transplantation.  His major issue at this point in time is that he has had a number of venous thromboses, and he does need a coronary angiogram.  The hematologists are trying to sort out when exactly he could be switched to heparin in order to undergo the procedure.    He otherwise feels well.  He denies any abdominal pain, itching, skin rash or fatigue.  He denies any increased abdominal girth or lower extremity edema.  He denies any fevers or chills, cough or shortness of breath.  He denies any nausea or vomiting, diarrhea or constipation.  His appetite has been good, and for the most part, his weight has been stable.    He is tolerating dialysis well.  He has not had any blood pressure issues on dialysis.    Current Outpatient Medications   Medication     apixaban ANTICOAGULANT (ELIQUIS) 5 MG tablet     bumetanide (BUMEX) 1 MG tablet     Calcium Carbonate-Vitamin D 500-3.125 MG-MCG TABS     folic acid (FOLVITE) 1 MG tablet     metoprolol succinate ER (TOPROL XL) 25 MG 24 hr tablet     omeprazole 20 MG tablet     oxyCODONE (ROXICODONE) 5 MG tablet     predniSONE (DELTASONE) 5 MG tablet     spironolactone (ALDACTONE) 50 MG tablet     vancomycin (VANCOCIN) 125 MG capsule     XIFAXAN 550 MG TABS tablet     zinc sulfate (ZINCATE) 220 (50 Zn) MG capsule     No current facility-administered medications for this visit.     BP (!) 130/92 (BP Location: Right arm, Patient Position: Sitting, Cuff Size: Adult Large)    "Pulse 79   Ht 1.727 m (5' 7.99\")   Wt 101.1 kg (222 lb 14.4 oz)   SpO2 100%   BMI 33.90 kg/m      PHYSICAL EXAMINATION:    GENERAL:  He looks well.  HEENT:  No scleral icterus or temporal muscle wasting.  CHEST:  Clear.  ABDOMEN:  Umbilical hernia that is easily reduced and is moderate sized.  It is difficult to appreciate whether ascites is present.  His liver is 10 cm in span without left lobe enlargement.  No spleen tip is palpable.  EXTREMITIES:  No edema.  SKIN:  No stigmata of chronic liver disease.  NEUROLOGIC:  No asterixis.    IMPRESSION:  Mr. Quinones has cirrhosis and end-stage renal disease on dialysis, being evaluated for liver and kidney transplantation.  As mentioned, the only thing that is standing in his way at the present time is the coronary angiogram.  I have tried to work with the hematologist and cardiologist to get him scheduled and then get him switched over to heparin so he can have the procedure done.  They may want to keep him on Coumadin afterwards, which might be a little bit safer for him.  He will get a Prevnar 13 vaccine, which he is due for today, and otherwise, I will see him back in the clinic in 3 months.    I did spend total of 40 minutes (on the date of the encounter), including 30 minutes of face-to-face clinic time including counseling. The rest of the time was spent in documentation and review of records.     Thank you very much for allowing me to participate in the care of this patient.  If you have any questions regarding recommendations, please do not hesitate to contact me.         Jose Poole MD      Professor of Medicine  [employer]      Executive Medical Director, Solid Organ Transplant Program  [facility]          Again, thank you for allowing me to participate in the care of your patient.        Sincerely,        Jose Poole MD    "

## 2023-03-21 NOTE — NURSING NOTE
"Chief Complaint   Patient presents with     RECHECK     Follow-up     BP (!) 130/92 (BP Location: Right arm, Patient Position: Sitting, Cuff Size: Adult Large)   Pulse 79   Ht 1.727 m (5' 7.99\")   Wt 101.1 kg (222 lb 14.4 oz)   SpO2 100%   BMI 33.90 kg/m      Lorna Palomino on 3/21/2023 at 7:56 AM    "

## 2023-03-28 ENCOUNTER — MYC REFILL (OUTPATIENT)
Dept: FAMILY MEDICINE | Facility: CLINIC | Age: 68
End: 2023-03-28

## 2023-03-28 ENCOUNTER — HOSPITAL ENCOUNTER (OUTPATIENT)
Dept: PHYSICAL THERAPY | Facility: REHABILITATION | Age: 68
Discharge: HOME | End: 2023-03-28
Payer: COMMERCIAL

## 2023-03-28 DIAGNOSIS — I10 ESSENTIAL HYPERTENSION: Primary | ICD-10-CM

## 2023-03-28 DIAGNOSIS — Z74.09 IMPAIRED FUNCTIONAL MOBILITY, BALANCE, GAIT, AND ENDURANCE: Primary | ICD-10-CM

## 2023-03-28 PROCEDURE — 97110 THERAPEUTIC EXERCISES: CPT | Mod: GP

## 2023-03-28 PROCEDURE — 97750 PHYSICAL PERFORMANCE TEST: CPT | Mod: GP

## 2023-03-28 NOTE — PROGRESS NOTES
Beginning/End Dates of Progress Note Reporting Period:  2/28/23 to 3/28/28    Progress Toward Goals:   Progress this reporting period: Casey continues to report increased overall strength/endurance, and he feels like his tolerance to daily activities is improving. The swelling in his right hand continues to be noticeably improved as well. Today he improved his FGA score to 24/30, indicating improved functional balance and a decreased fall risk. He also improved his 30 second sit-to-stand score to 15 reps, compared to 9 at initial evaluation, indicating improve tolerance to functional activity and increased lower extremity strength. He also improved his 6 MWT to 391 meters, indicating significantly improved functional capacity and endurance. He improved his LEFS score to 59/80, indicating improved function.    Client Self (Subjective) Report for Progress Note Reporting Period: Casey reports overall improvements, though he said he has been feeling a bit worse the past few days due to less sleep and not watching his diet as carefully while hosting.    Outcome Measures (Most Recent Score):    LEFS: 59/80  6 MWT: 391.0 meters  30 Second Sit-to-stand: 15 reps  FGA: 24/30    Objective Measurements:   Objective Measure: 30 Second Sit-to-stand  Details: 15 reps  Objective Measure: 6 MWT  Details: 391.0 meters  Objective Measure: Worst Pain  Details: Preceding week: 1-2/10 (right hand)  Objective Measure: Upper Extremity Strength  Details: Elbow flexion/extension: 5/5 on left; not tested on right.  Objective Measure: Lower Extremity Strength  Details: Bilateral hip flexion: 5/5  Objective Measure: FGA  Details: 24/30

## 2023-03-29 RX ORDER — BUMETANIDE 1 MG/1
2 TABLET ORAL 2 TIMES DAILY
Qty: 360 TABLET | Refills: 1 | Status: ON HOLD | OUTPATIENT
Start: 2023-03-29 | End: 2023-06-25

## 2023-03-29 NOTE — PROGRESS NOTES
Mr. Quinones is a 66 yo male who is referred with a history of relatively new non-occlusive right internal jugular and right axillary venous thrombus with associated right arm swelling.    This was diagnosed on 2/25/23 at [facility] despite already taking abixaban. He has a complex medical history including cirrhosis s/p TIPS, psoriasis, HTN, vasculitis, and DM2.    He was initially referred to hematology by hepatologist after the right internal jugular thrombus was recently diagnosed.     His wife is also present for the discussion. Since being started on prednisone for gout flare, his right arm swelling is essentially completely resolved. He denies any other pain or other issues in his right arm or head or neck.     He has not missed any anticoagulation doses.    PE:  No acute distress    I reviewed the CT venogram of his chest and right arm which reveals patent central veins with possible mild medial left subclavian vein stenosis.    A/P:    66 yo with non-occlusive right internal jugular and axillary vein thrombus which appears to have developed on anticoagulation, concern for possible central venous obstruction.    His recent right arm and upper extremity swelling has resolved and he has no residual concerns. No neck or head symptoms from his internal jugular thrombus.     Given the improved symptoms, I would like to repeat his ultrasound as it appears his arm symptoms were likely related to a gout or other flare.     If his right axillary vein thrombus is resolved and the essentially normal CT venogram, I don't think a venogram is necessary right now. If on the follow-up ultrasound the thrombus persists or is somehow progressed, we can proceed to a venogram with possible intervention.     Mr. Quinones and his wife are agreeable to the plan.

## 2023-03-30 ENCOUNTER — TELEPHONE (OUTPATIENT)
Dept: TRANSPLANT | Facility: CLINIC | Age: 68
End: 2023-03-30
Payer: COMMERCIAL

## 2023-03-30 ENCOUNTER — DOCUMENTATION ONLY (OUTPATIENT)
Dept: HEMATOLOGY | Facility: CLINIC | Age: 68
End: 2023-03-30
Payer: COMMERCIAL

## 2023-03-30 NOTE — PROGRESS NOTES
[facility]  [facility]  [facility] [address], [address], [place] [address]  Main: [phone], Fax: [phone]    Documentation Note:    Patient: Damion Quinones  MRN: [id number]  : 1955  Date of this note written: 2023    Brief History:  Damion is a 67 year old male with a very complicated history including end stage renal disease on hemodialysis via a left internal jugular vein catheter, history of alcohol associated cirrhosis causing hepatic encephalopathy and esophageal varices S/P TIPS, history of paroxysmal atrial fibrillation (who was not on anticoagulation therapy prior to 2023), psoriatic arthritis, HTN, ANCA vasculitis, T2DM2, hereditary hemochromatosis, who also has a history of gout and recurrent C.diff colitis, who was found to have right leg DVT back on 2023 and right internal jugular vein thrombosis on 2023. He is currently on apixaban at 5 mg PO BID dosing.     Our clinic was consulted and was seen by this writer on 3/3/2023 and at the time he had significant right hand swelling. The consult to us was to determine why he has a right internal jugular vein thrombosis despite being on apixaban. During this visit with this writer on 3/3/2023, I have determined that his right leg DVT was a provoked event as he was hospitalized for recurrent C.diff colitis and encephalopathy at the time. Thus for this event, he only needs 3 months of anticoagulation therapy.     Also during my visit with him on 3/3/2023, we have determined that his right internal jugular vein thrombosis was likely a dialysis catheter provoked right internal jugular vein thrombus dated back to 2022 with now residual thrombus remains. Thus he will not need long term anticoagulation therapy for this right internal jugular thrombus.    We also performed an apxiaban blood level during his visit with us back on 3/3/2023 for which his apixaban level was  "within therapeutic range.     Since his appointment with this writer on 3/3/2023, the patient was started on Gout treatment and his right hand swelling and pain significantly improved and subsequently resolved. Thus his right hand swelling and pain was related to gout flare not because of his right upper extremity DVT. His finding of nonocclusive axillary vein thrombosis has resolved within days after it was discovered. 3/3/2023 ultrasound showed a \"new nonocclusive right axillary vein thrombus\" and repeat ultrasound done back Dr. Brady of IR on 3/21/2023 showed only chronic appearing nonocclusive thrombus in the right internal jugular vein. No DVT at the right axillary vein.     The patient has been very anxious about proceeding with renal / hepatic transplant evaluation including the postponed left heart catheter evaluation that was cancelled due to the above findings of thrombosis.     There have been a lot of Epic Staffs communications between the transplant clinic coordinator and staffs, myself, Dr. Gutierrez (staff hematologist), Dr. Brady, Dr. Poole, and Dr. Jimenez and the past week or so about the ongoing anticoagulation therapy plan for this patient.     Dr. Brady of interventional radiology has evaluated the patient and does not feel that this patient has any central venous occlusions and thus no indications for further evaluation in regard to this.     This writer has spoken with Dr. Vamshi Gutierrez (Staff hematologist) and he also has provided his recommendation about this patient's anticoagulation management moving forward.    To summarize our recommendations in regard to this patient's anticoagulation therapy per Dr. Gutierrez recommendations:    His right leg DVT was a provoked event and thus he will need anticoagulation until 4/23/2023 (3 months after his initial diagnosis of right leg DVT).     Apixaban is proven to be effective and the patient has a blood level that is within therapeutic range " and thus he will remains on apixaban at 5 mg PO BID through 4/23/2023.     He has a chronic right internal jugular vein thrombus that likely caused by his dialysis catheter at this site back in Sept 2022 and thus he will not need anticoagulation therapy for this.     It is our recommendation that this patient is to stop his apixaban as of 4/23/2023. He can get a repeat right leg ultrasound done on or after 4/23/2023 to set as a baseline for future reference. This has been set up by this writer.     He can get his left heart catheter evaluation on or after 4/25/2023 (48 hours after he has stopped his apixaban).      At this time, we have no further plans to see this patient back on a routine basis. I will cancel his appointment with Dr. Jhoana Crenshaw on 4/23/2023.     I have called Apoorva Quinones and Damion Quinones (patient and his wife) on 3/30/2023 at 10:45am to discuss the above plan and inform them PRELIMINARILY (awaiting final approval from Dr. Poole when he returns next week) that the plan is to have them scheduled for left heart catheter evaluation as soon as Damion has discontinued his apixaban. He is instructed by this writer to STOP apixaban as of 4/23/2023, keep his already scheduled right leg ultrasound on 4/23/2023 but I will cancel his appointment with Dr. Crenshaw on 4/23/2023.       Bhupinder Yancey PA-C, MPAS  Physician Assistant  [facility].

## 2023-03-30 NOTE — TELEPHONE ENCOUNTER
Spoke with Sohail, they spoke with clotting disorders clinic and know to discontinue Eliquis on 4/23/23.  Message to [facility] for cardiology about getting LHC scheduled on/after 4/25, as patient needs to be off anticoagulation for 48 hrs prior to LHC.      They will let me know if the do not receive info on rescheduling.

## 2023-04-03 ENCOUNTER — TELEPHONE (OUTPATIENT)
Dept: TRANSPLANT | Facility: CLINIC | Age: 68
End: 2023-04-03
Payer: COMMERCIAL

## 2023-04-03 NOTE — TELEPHONE ENCOUNTER
Patient Call: General  Route to LPN    Reason for call: Apoorva Patient's wife calling to inform Coordinator that Damion is in the ER in [place], wondering about transferring patient to the       Call back needed? Yes    Return Call Needed  Same as documented in contacts section  When to return call?: Same day: Route High Priority

## 2023-04-03 NOTE — TELEPHONE ENCOUNTER
Wife called    Patient sent to ED in [place] with AMS>  Was doing OK this morning.  Very confused after dialysis. They tried to have EMTs bring hi here or [facility], but they refused.     Patient will be transferred to [facility] based on bed availability    Head CT negative- continuing work up for Metabolic.hepatic encephalopathy.  Ammonia in 90's, but patient was also in 90's when discharged from this facility.    Wife will keep us posted on outcome- transplant eval in holding pattern until he can have C 4/25/22 (cannot be earlier due to anticoagulation- needs to be on Eliquis for 3 months)

## 2023-04-07 ENCOUNTER — APPOINTMENT (OUTPATIENT)
Dept: GENERAL RADIOLOGY | Facility: CLINIC | Age: 68
End: 2023-04-07
Attending: EMERGENCY MEDICINE
Payer: COMMERCIAL

## 2023-04-07 ENCOUNTER — HOSPITAL ENCOUNTER (INPATIENT)
Facility: CLINIC | Age: 68
LOS: 3 days | Discharge: HOME | End: 2023-04-10
Attending: EMERGENCY MEDICINE | Admitting: INTERNAL MEDICINE
Payer: COMMERCIAL

## 2023-04-07 ENCOUNTER — APPOINTMENT (OUTPATIENT)
Dept: CT IMAGING | Facility: CLINIC | Age: 68
End: 2023-04-07
Attending: EMERGENCY MEDICINE
Payer: COMMERCIAL

## 2023-04-07 DIAGNOSIS — G93.40 ENCEPHALOPATHY, UNSPECIFIED: ICD-10-CM

## 2023-04-07 DIAGNOSIS — G93.40 ENCEPHALOPATHY: ICD-10-CM

## 2023-04-07 DIAGNOSIS — N18.6 END STAGE RENAL DISEASE (H): ICD-10-CM

## 2023-04-07 DIAGNOSIS — R19.7 DIARRHEA, UNSPECIFIED TYPE: Primary | ICD-10-CM

## 2023-04-07 DIAGNOSIS — Z99.2 DEPENDENCE ON RENAL DIALYSIS (H): ICD-10-CM

## 2023-04-07 LAB
ALBUMIN SERPL BCG-MCNC: 3.3 G/DL (ref 3.5–5.2)
ALBUMIN UR-MCNC: 10 MG/DL
ALP SERPL-CCNC: 170 U/L (ref 40–129)
ALT SERPL W P-5'-P-CCNC: 46 U/L (ref 10–50)
AMMONIA PLAS-SCNC: 56 UMOL/L (ref 16–60)
ANION GAP SERPL CALCULATED.3IONS-SCNC: 17 MMOL/L (ref 7–15)
APPEARANCE UR: CLEAR
AST SERPL W P-5'-P-CCNC: 63 U/L (ref 10–50)
BASOPHILS # BLD AUTO: 0.1 10E3/UL (ref 0–0.2)
BASOPHILS NFR BLD AUTO: 1 %
BILIRUB SERPL-MCNC: 1 MG/DL
BILIRUB UR QL STRIP: NEGATIVE
BUN SERPL-MCNC: 22.1 MG/DL (ref 8–23)
CALCIUM SERPL-MCNC: 9.3 MG/DL (ref 8.8–10.2)
CHLORIDE SERPL-SCNC: 104 MMOL/L (ref 98–107)
COLOR UR AUTO: YELLOW
CREAT SERPL-MCNC: 2.51 MG/DL (ref 0.67–1.17)
DEPRECATED HCO3 PLAS-SCNC: 20 MMOL/L (ref 22–29)
EOSINOPHIL # BLD AUTO: 0.2 10E3/UL (ref 0–0.7)
EOSINOPHIL NFR BLD AUTO: 1 %
ERYTHROCYTE [DISTWIDTH] IN BLOOD BY AUTOMATED COUNT: 17.6 % (ref 10–15)
GFR SERPL CREATININE-BSD FRML MDRD: 28 ML/MIN/1.73M2
GLUCOSE SERPL-MCNC: 83 MG/DL (ref 70–99)
GLUCOSE UR STRIP-MCNC: NEGATIVE MG/DL
HCT VFR BLD AUTO: 43.4 % (ref 40–53)
HGB BLD-MCNC: 13.4 G/DL (ref 13.3–17.7)
HGB UR QL STRIP: ABNORMAL
IMM GRANULOCYTES # BLD: 0.5 10E3/UL
IMM GRANULOCYTES NFR BLD: 4 %
INR PPP: 1.37 (ref 0.85–1.15)
KETONES UR STRIP-MCNC: NEGATIVE MG/DL
LACTATE SERPL-SCNC: 2.3 MMOL/L (ref 0.7–2)
LACTATE SERPL-SCNC: 2.8 MMOL/L (ref 0.7–2)
LACTATE SERPL-SCNC: 2.9 MMOL/L (ref 0.7–2)
LACTATE SERPL-SCNC: 3.1 MMOL/L (ref 0.7–2)
LEUKOCYTE ESTERASE UR QL STRIP: NEGATIVE
LYMPHOCYTES # BLD AUTO: 1.8 10E3/UL (ref 0.8–5.3)
LYMPHOCYTES NFR BLD AUTO: 14 %
MCH RBC QN AUTO: 29.5 PG (ref 26.5–33)
MCHC RBC AUTO-ENTMCNC: 30.9 G/DL (ref 31.5–36.5)
MCV RBC AUTO: 95 FL (ref 78–100)
MONOCYTES # BLD AUTO: 1.6 10E3/UL (ref 0–1.3)
MONOCYTES NFR BLD AUTO: 12 %
MUCOUS THREADS #/AREA URNS LPF: PRESENT /LPF
NEUTROPHILS # BLD AUTO: 8.8 10E3/UL (ref 1.6–8.3)
NEUTROPHILS NFR BLD AUTO: 68 %
NITRATE UR QL: NEGATIVE
NRBC # BLD AUTO: 0 10E3/UL
NRBC BLD AUTO-RTO: 0 /100
PH UR STRIP: 5.5 [PH] (ref 5–7)
PLATELET # BLD AUTO: 164 10E3/UL (ref 150–450)
POTASSIUM SERPL-SCNC: 3.9 MMOL/L (ref 3.4–5.3)
PROT SERPL-MCNC: 5.9 G/DL (ref 6.4–8.3)
RBC # BLD AUTO: 4.55 10E6/UL (ref 4.4–5.9)
RBC URINE: 5 /HPF
SARS-COV-2 RNA RESP QL NAA+PROBE: NEGATIVE
SODIUM SERPL-SCNC: 141 MMOL/L (ref 136–145)
SP GR UR STRIP: 1.01 (ref 1–1.03)
TSH SERPL DL<=0.005 MIU/L-ACNC: 3.37 UIU/ML (ref 0.3–4.2)
UROBILINOGEN UR STRIP-MCNC: NORMAL MG/DL
WBC # BLD AUTO: 13 10E3/UL (ref 4–11)
WBC URINE: 2 /HPF

## 2023-04-07 PROCEDURE — 99285 EMERGENCY DEPT VISIT HI MDM: CPT | Mod: 25 | Performed by: EMERGENCY MEDICINE

## 2023-04-07 PROCEDURE — 99222 1ST HOSP IP/OBS MODERATE 55: CPT | Mod: GC | Performed by: INTERNAL MEDICINE

## 2023-04-07 PROCEDURE — 81001 URINALYSIS AUTO W/SCOPE: CPT | Performed by: EMERGENCY MEDICINE

## 2023-04-07 PROCEDURE — 36415 COLL VENOUS BLD VENIPUNCTURE: CPT

## 2023-04-07 PROCEDURE — 80053 COMPREHEN METABOLIC PANEL: CPT | Performed by: EMERGENCY MEDICINE

## 2023-04-07 PROCEDURE — 85610 PROTHROMBIN TIME: CPT

## 2023-04-07 PROCEDURE — U0003 INFECTIOUS AGENT DETECTION BY NUCLEIC ACID (DNA OR RNA); SEVERE ACUTE RESPIRATORY SYNDROME CORONAVIRUS 2 (SARS-COV-2) (CORONAVIRUS DISEASE [COVID-19]), AMPLIFIED PROBE TECHNIQUE, MAKING USE OF HIGH THROUGHPUT TECHNOLOGIES AS DESCRIBED BY CMS-2020-01-R: HCPCS

## 2023-04-07 PROCEDURE — 120N000002 HC R&B MED SURG/OB UMMC

## 2023-04-07 PROCEDURE — 99285 EMERGENCY DEPT VISIT HI MDM: CPT | Performed by: EMERGENCY MEDICINE

## 2023-04-07 PROCEDURE — 70450 CT HEAD/BRAIN W/O DYE: CPT

## 2023-04-07 PROCEDURE — 250N000013 HC RX MED GY IP 250 OP 250 PS 637

## 2023-04-07 PROCEDURE — 71046 X-RAY EXAM CHEST 2 VIEWS: CPT

## 2023-04-07 PROCEDURE — 85025 COMPLETE CBC W/AUTO DIFF WBC: CPT | Performed by: EMERGENCY MEDICINE

## 2023-04-07 PROCEDURE — 71046 X-RAY EXAM CHEST 2 VIEWS: CPT | Mod: 26 | Performed by: RADIOLOGY

## 2023-04-07 PROCEDURE — 250N000013 HC RX MED GY IP 250 OP 250 PS 637: Performed by: EMERGENCY MEDICINE

## 2023-04-07 PROCEDURE — 87040 BLOOD CULTURE FOR BACTERIA: CPT

## 2023-04-07 PROCEDURE — 82140 ASSAY OF AMMONIA: CPT | Performed by: EMERGENCY MEDICINE

## 2023-04-07 PROCEDURE — 70450 CT HEAD/BRAIN W/O DYE: CPT | Mod: 26 | Performed by: STUDENT IN AN ORGANIZED HEALTH CARE EDUCATION/TRAINING PROGRAM

## 2023-04-07 PROCEDURE — 258N000003 HC RX IP 258 OP 636

## 2023-04-07 PROCEDURE — 36415 COLL VENOUS BLD VENIPUNCTURE: CPT | Performed by: EMERGENCY MEDICINE

## 2023-04-07 PROCEDURE — 83605 ASSAY OF LACTIC ACID: CPT

## 2023-04-07 PROCEDURE — 84443 ASSAY THYROID STIM HORMONE: CPT | Performed by: EMERGENCY MEDICINE

## 2023-04-07 RX ORDER — PANTOPRAZOLE SODIUM 40 MG/1
40 TABLET, DELAYED RELEASE ORAL 2 TIMES DAILY
Status: DISCONTINUED | OUTPATIENT
Start: 2023-04-07 | End: 2023-04-10 | Stop reason: HOSPADM

## 2023-04-07 RX ORDER — LACTULOSE 10 G/15ML
20 SOLUTION ORAL
Status: DISCONTINUED | OUTPATIENT
Start: 2023-04-07 | End: 2023-04-10 | Stop reason: HOSPADM

## 2023-04-07 RX ORDER — LACTULOSE 10 G/15ML
100 SOLUTION ORAL
Status: DISCONTINUED | OUTPATIENT
Start: 2023-04-07 | End: 2023-04-10 | Stop reason: HOSPADM

## 2023-04-07 RX ORDER — LIDOCAINE 40 MG/G
CREAM TOPICAL
Status: DISCONTINUED | OUTPATIENT
Start: 2023-04-07 | End: 2023-04-10 | Stop reason: HOSPADM

## 2023-04-07 RX ORDER — LACTULOSE 10 G/10G
30 SOLUTION ORAL 3 TIMES DAILY
Status: DISCONTINUED | OUTPATIENT
Start: 2023-04-07 | End: 2023-04-08

## 2023-04-07 RX ORDER — BUMETANIDE 2 MG/1
2 TABLET ORAL 2 TIMES DAILY
Status: DISCONTINUED | OUTPATIENT
Start: 2023-04-07 | End: 2023-04-10 | Stop reason: HOSPADM

## 2023-04-07 RX ORDER — SPIRONOLACTONE 25 MG/1
25 TABLET ORAL DAILY
Status: DISCONTINUED | OUTPATIENT
Start: 2023-04-08 | End: 2023-04-10 | Stop reason: HOSPADM

## 2023-04-07 RX ORDER — ZINC SULFATE 50(220)MG
220 CAPSULE ORAL DAILY
Status: DISCONTINUED | OUTPATIENT
Start: 2023-04-08 | End: 2023-04-10 | Stop reason: HOSPADM

## 2023-04-07 RX ORDER — FOLIC ACID 1 MG/1
5 TABLET ORAL DAILY
Status: DISCONTINUED | OUTPATIENT
Start: 2023-04-08 | End: 2023-04-10 | Stop reason: HOSPADM

## 2023-04-07 RX ORDER — SODIUM CHLORIDE 9 MG/ML
INJECTION, SOLUTION INTRAVENOUS CONTINUOUS
Status: DISCONTINUED | OUTPATIENT
Start: 2023-04-07 | End: 2023-04-07

## 2023-04-07 RX ORDER — LACTULOSE 10 G/10G
40 SOLUTION ORAL ONCE
Status: COMPLETED | OUTPATIENT
Start: 2023-04-07 | End: 2023-04-07

## 2023-04-07 RX ORDER — METOPROLOL SUCCINATE 25 MG/1
25 TABLET, EXTENDED RELEASE ORAL EVERY 24 HOURS
Status: DISCONTINUED | OUTPATIENT
Start: 2023-04-08 | End: 2023-04-10 | Stop reason: HOSPADM

## 2023-04-07 RX ORDER — VANCOMYCIN HYDROCHLORIDE 125 MG/1
125 CAPSULE ORAL EVERY OTHER DAY
Status: DISCONTINUED | OUTPATIENT
Start: 2023-04-08 | End: 2023-04-08

## 2023-04-07 RX ORDER — PREDNISONE 10 MG/1
10 TABLET ORAL DAILY
Status: DISCONTINUED | OUTPATIENT
Start: 2023-04-08 | End: 2023-04-10 | Stop reason: HOSPADM

## 2023-04-07 RX ADMIN — SODIUM CHLORIDE: 9 INJECTION, SOLUTION INTRAVENOUS at 17:25

## 2023-04-07 RX ADMIN — PANTOPRAZOLE SODIUM 40 MG: 40 TABLET, DELAYED RELEASE ORAL at 19:37

## 2023-04-07 RX ADMIN — LACTULOSE 40 G: 10 POWDER, FOR SOLUTION ORAL at 12:41

## 2023-04-07 RX ADMIN — Medication 1 TABLET: at 19:38

## 2023-04-07 RX ADMIN — BUMETANIDE 2 MG: 2 TABLET ORAL at 19:38

## 2023-04-07 RX ADMIN — LACTULOSE 30 G: 10 POWDER, FOR SOLUTION ORAL at 20:34

## 2023-04-07 RX ADMIN — APIXABAN 5 MG: 5 TABLET, FILM COATED ORAL at 19:38

## 2023-04-07 RX ADMIN — RIFAXIMIN 550 MG: 550 TABLET ORAL at 19:38

## 2023-04-07 ASSESSMENT — ACTIVITIES OF DAILY LIVING (ADL)
WALKING_OR_CLIMBING_STAIRS_DIFFICULTY: NO
CONCENTRATING,_REMEMBERING_OR_MAKING_DECISIONS_DIFFICULTY: YES
CHANGE_IN_FUNCTIONAL_STATUS_SINCE_ONSET_OF_CURRENT_ILLNESS/INJURY: YES
DIFFICULTY_EATING/SWALLOWING: NO
ADLS_ACUITY_SCORE: 35
DOING_ERRANDS_INDEPENDENTLY_DIFFICULTY: NO
FALL_HISTORY_WITHIN_LAST_SIX_MONTHS: NO
DRESSING/BATHING_DIFFICULTY: NO
ADLS_ACUITY_SCORE: 35
ADLS_ACUITY_SCORE: 35
HEARING_DIFFICULTY_OR_DEAF: NO
ADLS_ACUITY_SCORE: 35
DIFFICULTY_COMMUNICATING: NO
ADLS_ACUITY_SCORE: 35
TOILETING_ISSUES: NO
ADLS_ACUITY_SCORE: 35
WEAR_GLASSES_OR_BLIND: NO

## 2023-04-07 NOTE — ED TRIAGE NOTES
Wife reports has had intermittent confusion and was watched in the ER on Monday in [place], had mild elevated ammonia levels. Had dialysis today, was unable to stand after his run, wife states he stares off randomly. She is concerned about encephalopathy.      Triage Assessment     Row Name 04/07/23 1101       Triage Assessment (Adult)    Airway WDL WDL       Respiratory WDL    Respiratory WDL WDL       Skin Circulation/Temperature WDL    Skin Circulation/Temperature WDL WDL       Cardiac WDL    Cardiac WDL WDL       Peripheral/Neurovascular WDL    Peripheral Neurovascular WDL X;WDL       Cognitive/Neuro/Behavioral WDL    Cognitive/Neuro/Behavioral WDL X    Level of Consciousness confused    Orientation disoriented to;situation;time       Robbin Coma Scale    Best Eye Response 4-->(E4) spontaneous    Best Motor Response 6-->(M6) obeys commands    Best Verbal Response 5-->(V5) oriented    Robbin Coma Scale Score 15

## 2023-04-07 NOTE — H&P
Resident/Fellow Attestation   I, Vi Esteban MD, was present with the medical/GEORGE student who participated in the service and in the documentation of the note.  I have verified the history and personally performed the physical exam and medical decision making.  I agree with the assessment and plan of care as documented in the note.      Key findings: 67 y.o. 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 Confusion began night prior to admission and improved significantly by time of evaluation after morning HD session and lactulose. Patient not consistently taking lactulose since starting HD in February as he has been having significant diarrhea 2/2 c diff and post dialysis diarrhea. Notes his stools have been more formed recently after starting oral vanc taper. Has been having recurrent episodes of hepatic encephalopathy since TIPS procedure in 10/2022 despite having frequent bowel movements. Will consult Hepatology for additional guidance given frequency of episodes of HE.     Vi Esteban MD  PGY1  Date of Service (when I saw the patient): 04/07/23        [facility]    History and Physical - Medicine Service, [facility] TEAM 3       Date of Admission:  4/7/2023    Assessment & Plan      Damion Quinones is a 67 year old male admitted on 4/7/2023. He has a history of paroxysmal atrial fibrillation (on Apixiban), ESRD on HD, ESLD s/p TIPS (10/22), recurrent c diff, hereditary hemochromatosis who is presented to the ED with confusion and is admitted for altered mental status most likely secondary to hepatic encephalopathy.     #Altered mental status  #Confusion  #ESLD s/p TIPS  Patient with decompensated cirrhosis 2/2 alcohol use and hereditary hemochromatosis s/p TIPS 10/2022 decompensated by recurrent hepatic encephalopathy. Favor secondary to hepatic  "encephalopathy vs less likely infection vs other metabolic cause as the patient is significantly improved after HD this morning/lactulose in the ED, now oriented to time/place/person. CXR, UA in ED not consistent with pneumonia or UTI. CT head obtained and negative for acute intracranial process.  -MELD score 23  -CMP, CBC tomorrow to follow uremia and mild leukocytosis  -Blood cultures, lactate now  -Repeat ammonia tomorrow if mental status declines again  -Lactulose 30mg TID PRN, hold for >3 loose stools   -[place] Protocol  -PTA spironolactone 25 mg every day  -PTA budesonide 2mg BID  -PTA rifaximin 550mg BID  -PTA zinc sulfate 220mg every day  -PTA calcium, folate supplementation  -Consult placed to luminal GI for recommendations on changes to medication regimen     #C. difficile colitis  No longer having loose, watery diarrhea. Currently on a vancomycin 125mg every other day taper until 4/11  -Continue vancomycin 125mg every other day until 4/11    #PAF  #Hx DVT  -PTA apixaban 5mg BID  -PTA metoprolol 25mg every day    #Hx Gout  -PTA prednisone 10mg every day    #GERD  -Therapeutic interchange pantoprazole 40mg BID from omeprazole 40mg BID       Diet: Renal Diet (dialysis)  DVT Prophylaxis: DOAC  Lux Catheter: Not present  Fluids: PO ad libitum  Lines: PRESENT             Cardiac Monitoring: None  Code Status: Full Code    Clinically Significant Risk Factors Present on Admission              # Hypoalbuminemia: Lowest albumin = 3.3 g/dL at 4/7/2023 11:50 AM, will monitor as appropriate  # Drug Induced Coagulation Defect: home medication list includes an anticoagulant medication         # Obesity: Estimated body mass index is 33.46 kg/m  as calculated from the following:    Height as of 3/21/23: 1.727 m (5' 7.99\").    Weight as of this encounter: 99.8 kg (220 lb).           Disposition Plan      Expected Discharge Date: 04/09/2023                The patient's care was discussed with the Attending Physician, " Dr. Samuels.      _____________________________________________________________________    Chief Complaint   Altered Mental Status     History is obtained from the patient, family, chart    History of Present Illness   Damion Quinones is a 67 year old male who has a history of paroxysmal atrial fibrillation (on Apixiban), ESRD on HD, ESLD s/p TIPS (10/22), recurrent c diff, hereditary hemochromatosis who is presented to the ED with confusion and is admitted for altered mental status workup.     Patient presented to [facility] ED on 4/3/23 following dialysis session after he was noted to be acutely confused and unsteady on his feet. Patient reportedly at baseline mental status prior to HD and drove himself to his appointment. Notably patient had not been taking lactulose at home given he has having >3 loose stools a day 2/2 c diff colitis. Workup at that time was not concerning for infection and plan was to admit patient for additional workup with head CT and labs. Ammonia level returned elevated to 96 and patient was started on lactulose given concern hepatic encephalopathy was contributing to acute confusion. No beds available so patient was monitored in ED overnight with symptomatic improvement in AM. Patient ultimately discharged home no 4/4 with improved mental status.     Patient underwent HD again on 4/5 and mental status remained improved throughout the day. On 4/6, patient at baseline in the morning. Wife went to run errands and when she returned in the afternoon she noticed he was more confused again. He went to dialysis in the morning on 4/7 and then presented to the ED for persistent confusion.     On exam, patient mental status is improved since last night however wife states still not quite at baseline. Patient had not been consistently been taking lactulose since February when he started on dialysis. He notes he has been having frequent episodes of diarrhea since starting on dialysis and with recent C diff  infection, notes he has been having 3-4 bowel movements daily. Patient's wife gave him dose of lactulose on 4/6 in PM and 4/7 in AM. Patient is still on vancomycin taper for recurrent C diff infection and is scheduled to finish on 4/11.  Patient is currently having 3-4 formed stools. Episodes of confusion have been occurring monthly since patient underwent TIPS procedure. Patient and wife state he has been more short of breath recently.     ED Course:  - Afebrile, VSS  - Labs remarkable for WBC 13.0, Ammonia 56, UA with moderate blood   - CXR negative for acute airspace process  - CT head negative for acute intracranial process explaining encephalopathy, demonstrated chronic small vessel ischemic disease and chronic R anterior inferior frontal lobe encephalomalacia   - Patient given 1 dose lactulose and admitted to medicine for further workup and management    Past Medical History    Past Medical History:   Diagnosis Date     Alcoholic cirrhosis of liver with ascites (H) 10/11/2019     Arthritis     RA     C. difficile colitis      Chronic kidney disease      Coronary artery disease     AFib 2016     Diabetes mellitus type 2, uncomplicated (H) 10/11/2019     History of blood transfusion     2016     History of hemochromatosis 10/11/2019     Hypertension      Hypokalemia      Obesity      PAF (paroxysmal atrial fibrillation) (H)      Psoriatic arthritis (H)        Past Surgical History   Past Surgical History:   Procedure Laterality Date     APPENDECTOMY      Removed at 18 Years Old      COLONOSCOPY      2014 at [facility].      EYE SURGERY Bilateral     Cataract     HERNIA REPAIR      History of bilateral inguinal hernia repair: 10/28/2014. Open hernia repair: 10/2017. Abdominal wound exploration and debridement 12/27/2017     INSERT SHUNT PORTAL TRANSJUGULAR INTRAHEPTIC  09/26/2022     IR CVC TUNNEL PLACEMENT > 5 YRS OF AGE  01/26/2023       Prior to Admission Medications   Prior to Admission Medications    Prescriptions Last Dose Informant Patient Reported? Taking?   Calcium Carbonate-Vitamin D 500-3.125 MG-MCG TABS  Other Yes No   Sig: Take 1 tablet by mouth 2 times daily   XIFAXAN 550 MG TABS tablet  Other Yes No   Sig: Take 550 mg by mouth 2 times daily   apixaban ANTICOAGULANT (ELIQUIS) 5 MG tablet   No No   Sig: Take 1 tablet (5 mg) by mouth 2 times daily   bumetanide (BUMEX) 1 MG tablet   No No   Sig: Take 2 tablets (2 mg) by mouth 2 times daily for 180 days   folic acid (FOLVITE) 1 MG tablet  Other Yes No   Sig: TAKE FIVE TABLETS BY MOUTH EVERY DAY   metoprolol succinate ER (TOPROL XL) 25 MG 24 hr tablet   Yes No   Sig: Take 1 tablet by mouth daily at 2 pm   omeprazole 20 MG tablet   Yes No   Sig: Take 40 mg by mouth 2 times daily   oxyCODONE (ROXICODONE) 5 MG tablet   No No   Sig: Take half to one tablet PO Q 8 hours as needed for pain.   predniSONE (DELTASONE) 5 MG tablet  Other Yes No   Sig: Take 10 mg by mouth daily   spironolactone (ALDACTONE) 50 MG tablet  Other Yes No   Sig: Take 25 mg by mouth daily   vancomycin (VANCOCIN) 125 MG capsule   No No   Sig: Take 1 tab 2x/day for 2wks, then 1 tab 1x/day for 2 wks, then 1 tab every other day for 2 wks, then stop. Increase to 4x/day in the event of CDI symptoms   zinc sulfate (ZINCATE) 220 (50 Zn) MG capsule   No No   Sig: Take 1 capsule (220 mg) by mouth daily      Facility-Administered Medications: None        Review of Systems    The 10 point Review of Systems is negative other than noted in the HPI or here.      Physical Exam   Vital Signs: Temp: 97.9  F (36.6  C) Temp src: Oral BP: 119/84 Pulse: 89   Resp: 18 SpO2: 100 % O2 Device: None (Room air)    Weight: 220 lbs 0 oz    Constitutional: awake, alert, cooperative, no apparent distress, and appears stated age  Eyes: Lids and lashes normal, pupils equal, round extra ocular muscles intact, sclera clear, conjunctiva normal  ENT: Normocephalic, without obvious abnormality, atraumatic, external ears without  lesions, oral pharynx with moist mucous membranes  Respiratory: No increased work of breathing, good air exchange, clear to auscultation bilaterally, no crackles or wheezing  Cardiovascular: Normal apical impulse, regular rate and rhythm, normal S1 and S2, no S3 or S4, and no murmur noted  GI: No scars, soft, mildly distended, non-tender, no masses palpated  Genitounirinary: deferred   Skin: no bruising or bleeding, normal skin color, texture, turgor, no redness, warmth, or swelling, no rashes, no lesions and no jaundice  Musculoskeletal: There is no redness, warmth, or swelling of the joints. Asterixis noted. Tone is normal.  Neurologic: Awake, alert, oriented to name, place and time. Asterixis noted.  Neuropsychiatric: General: normal, calm and normal eye contact  Level of consciousness: alert / normal  Affect: normal  Orientation: oriented to self, place, time and situation    Medical Decision Making       Please see A&P for additional details of medical decision making.      Data     I have personally reviewed the following data over the past 24 hrs:    13.0 (H)  \   13.4   / 164     141 104 22.1 /  83   3.9 20 (L) 2.51 (H) \       ALT: 46 AST: 63 (H) AP: 170 (H) TBILI: 1.0   ALB: 3.3 (L) TOT PROTEIN: 5.9 (L) LIPASE: N/A       TSH: 3.37 T4: N/A A1C: N/A       INR:  1.37 (H) PTT:  N/A   D-dimer:  N/A Fibrinogen:  N/A       Imaging results reviewed over the past 24 hrs:   Recent Results (from the past 24 hour(s))   XR Chest 2 Views    Narrative    Examination: XR CHEST 2 VIEWS 4/7/2023 1:24 PM    Indication: Altered mental status    Comparison: CT 3/10/2023 and x-ray 2/13/2023    Findings:  PA and lateral views of the chest. Trachea is midline. Cardiac  silhouette is within normal limits. Prominent aortic knob. Left-sided  dual-lumen CVC tip terminates over the high right atrium. No  significant pleural effusion or appreciable pneumothorax. No focal  consolidation or pleural airspace opacities. Endovascular  coiling over  the epigastric region. Degenerative changes in visualized spine and  shoulder joints. No acute osseous abnormality.      Impression    Impression:   No acute airspace process.    I have personally reviewed the examination and initial interpretation  and I agree with the findings.    DELONTE CANTU MD         SYSTEM ID:  [id number]   CT Head w/o Contrast    Narrative    EXAM: CT HEAD W/O CONTRAST  4/7/2023 1:22 PM     HISTORY:  Altered mental status       COMPARISON:  Head CT 1/23/2023.    TECHNIQUE: Using multidetector thin collimation helical acquisition  technique, axial, coronal and sagittal CT images from the skull base  to the vertex were obtained without intravenous contrast.   (topogram) image(s) also obtained and reviewed.    FINDINGS:  No acute intracranial hemorrhage, mass effect, or midline shift. No  acute loss of gray-white matter differentiation in the cerebral  hemispheres. Chronic encephalomalacia in the anterior inferior right  frontal lobe gyrus rectus and orbital gyrus. Ventricles are  proportionate to the cerebral sulci. Clear basal cisterns. Scattered  periventricular hypoattenuating foci, as can be seen in chronic small  vessel ischemic disease.     The bony calvaria and the bones of the skull base are normal. The  visualized portions of the paranasal sinuses and mastoid air cells are  clear. Grossly normal orbits. Bilateral pseudophakia.      Impression    IMPRESSION:   1. No findings to explain the patient's encephalopathy.  2. Findings of chronic small vessel ischemic disease.  3. Unchanged right anterior inferior frontal lobe gyrus rectus and  orbital gyrus encephalomalacia.    I have personally reviewed the examination and initial interpretation  and I agree with the findings.    ALEX SPAULDING MD         SYSTEM ID:  [id number]

## 2023-04-07 NOTE — ED PROVIDER NOTES
[facility] ([facility])    4/07/23      History     Chief Complaint   Patient presents with     Altered Mental Status     HPI  Damion Quinones is a 67 year old male with PMH significant for ESRD on dialysis (MWF), ESLD s/p TIPS, recurrent C. difficile diarrhea, paroxysmal atrial fibrillation (anticoagulated on Eliquis), and hereditary hemochromatosis who presents to the ED for evaluation of AMS.  Patient is accompanied by wife who helps to provide history.    Patient's wife reports the mental status change started last night.  She indicates he has been taking lactulose, got a dose at 7 PM last night and 1 AM this morning.  Patient's wife reports he has been having normal BMs.  She states that this morning he was acting different, confused, more fatigued, and refused to eat.  She reports he still does make urine and has been urinating, did have UA done 4 days ago which was negative.  Wife reports taking the patient to dialysis this morning with his confusion and had no improvement after a full run.  Wife denies noting a fever or vomiting, does indicate patient reported some nausea last night.  Wife states he is significantly altered, normally completely alert and oriented.  She does endorse a cough, but states it is nonproductive and not consistent.  She also reports he is currently on a vancomycin taper for his recurrent C. difficile, taking a dose every other day.    Wife also reports a similar incident 4 days ago.  Per review of the chart, patient presented after a dialysis run with AMS.  Patient's wife reported at this time that the patient was doing well before hand, even drove himself to the dialysis that morning.  Patient was not on lactulose at this time, having 3 stools a day without assistance.  Broad work-up overall negative except for elevated ammonia to 96.  Patient was to be accepted for admission and stayed overnight, but due to bed delay was agreeable to being started on  a home trial of lactulose.    Past Medical History  Past Medical History:   Diagnosis Date     Alcoholic cirrhosis of liver with ascites (H) 10/11/2019     Arthritis     RA     C. difficile colitis      Chronic kidney disease      Coronary artery disease     AFib 2016     Diabetes mellitus type 2, uncomplicated (H) 10/11/2019     History of blood transfusion     2016     History of hemochromatosis 10/11/2019     Hypertension      Hypokalemia      Obesity      PAF (paroxysmal atrial fibrillation) (H)      Psoriatic arthritis (H)      Past Surgical History:   Procedure Laterality Date     APPENDECTOMY      Removed at 18 Years Old      COLONOSCOPY      2014 at [facility]      EYE SURGERY Bilateral     Cataract     HERNIA REPAIR      History of bilateral inguinal hernia repair: 10/28/2014. Open hernia repair: 10/2017. Abdominal wound exploration and debridement 12/27/2017     INSERT SHUNT PORTAL TRANSJUGULAR INTRAHEPTIC  09/26/2022     IR CVC TUNNEL PLACEMENT > 5 YRS OF AGE  01/26/2023     apixaban ANTICOAGULANT (ELIQUIS) 5 MG tablet  bumetanide (BUMEX) 1 MG tablet  Calcium Carbonate-Vitamin D 500-3.125 MG-MCG TABS  folic acid (FOLVITE) 1 MG tablet  metoprolol succinate ER (TOPROL XL) 25 MG 24 hr tablet  omeprazole 20 MG tablet  oxyCODONE (ROXICODONE) 5 MG tablet  predniSONE (DELTASONE) 5 MG tablet  spironolactone (ALDACTONE) 50 MG tablet  vancomycin (VANCOCIN) 125 MG capsule  XIFAXAN 550 MG TABS tablet  zinc sulfate (ZINCATE) 220 (50 Zn) MG capsule      No Known Allergies  Family History  Family History   Problem Relation Age of Onset     Lung Cancer Mother      Colon Cancer Father      Lung Cancer Brother      Heart Failure Brother      Kidney Disease Brother      Social History   Social History     Tobacco Use     Smoking status: Never     Smokeless tobacco: Never   Vaping Use     Vaping status: Never Used   Substance Use Topics     Alcohol use: Not Currently     Comment: Last ETOH use was 12/31/2021     Drug  use: Not Currently         A complete review of systems was attempted but limited due to altered mental status.    Physical Exam   BP: (!) 137/95  Pulse: 90  Temp: 97.9  F (36.6  C)  Resp: 18  Weight: 99.8 kg (220 lb)  SpO2: 100 %  Physical Exam  Vitals and nursing note reviewed.   Constitutional:       General: He is not in acute distress.     Appearance: He is not diaphoretic.   HENT:      Head: Normocephalic and atraumatic.      Nose: Nose normal.      Mouth/Throat:      Mouth: Mucous membranes are moist.      Pharynx: Oropharynx is clear.   Eyes:      Extraocular Movements: Extraocular movements intact.      Conjunctiva/sclera: Conjunctivae normal.      Pupils: Pupils are equal, round, and reactive to light.   Neck:      Comments: There is no meningismus.  Cardiovascular:      Rate and Rhythm: Normal rate.   Pulmonary:      Effort: Pulmonary effort is normal. No respiratory distress.      Breath sounds: No wheezing or rhonchi.   Abdominal:      Tenderness: There is no abdominal tenderness. There is no guarding.      Hernia: A hernia ( umbilical, non-tender, reducible) is present.   Musculoskeletal:      Cervical back: Normal range of motion.      Right lower leg: Edema present.      Left lower leg: Edema present.   Skin:     Coloration: Skin is not jaundiced or pale.      Findings: No erythema.   Neurological:      Mental Status: He is alert.      Comments: Oriented to person only.  Confused, unable to hold conversation.           ED Course, Procedures, & Data      Procedures   11:31 AM  The patient was seen and examined by Suzy Hair MD in Room [facility].                      Results for orders placed or performed during the hospital encounter of 04/07/23   CT Head w/o Contrast     Status: None    Narrative    EXAM: CT HEAD W/O CONTRAST  4/7/2023 1:22 PM     HISTORY:  Altered mental status       COMPARISON:  Head CT 1/23/2023.    TECHNIQUE: Using multidetector thin collimation helical acquisition  technique,  axial, coronal and sagittal CT images from the skull base  to the vertex were obtained without intravenous contrast.   (topogram) image(s) also obtained and reviewed.    FINDINGS:  No acute intracranial hemorrhage, mass effect, or midline shift. No  acute loss of gray-white matter differentiation in the cerebral  hemispheres. Chronic encephalomalacia in the anterior inferior right  frontal lobe gyrus rectus and orbital gyrus. Ventricles are  proportionate to the cerebral sulci. Clear basal cisterns. Scattered  periventricular hypoattenuating foci, as can be seen in chronic small  vessel ischemic disease.     The bony calvaria and the bones of the skull base are normal. The  visualized portions of the paranasal sinuses and mastoid air cells are  clear. Grossly normal orbits. Bilateral pseudophakia.      Impression    IMPRESSION:   1. No findings to explain the patient's encephalopathy.  2. Findings of chronic small vessel ischemic disease.  3. Unchanged right anterior inferior frontal lobe gyrus rectus and  orbital gyrus encephalomalacia.    I have personally reviewed the examination and initial interpretation  and I agree with the findings.    ALEX SPAULDING MD         SYSTEM ID:  [id number]   XR Chest 2 Views     Status: None    Narrative    Examination: XR CHEST 2 VIEWS 4/7/2023 1:24 PM    Indication: Altered mental status    Comparison: CT 3/10/2023 and x-ray 2/13/2023    Findings:  PA and lateral views of the chest. Trachea is midline. Cardiac  silhouette is within normal limits. Prominent aortic knob. Left-sided  dual-lumen CVC tip terminates over the high right atrium. No  significant pleural effusion or appreciable pneumothorax. No focal  consolidation or pleural airspace opacities. Endovascular coiling over  the epigastric region. Degenerative changes in visualized spine and  shoulder joints. No acute osseous abnormality.      Impression    Impression:   No acute airspace process.    I have personally  reviewed the examination and initial interpretation  and I agree with the findings.    DELONTE CANTU MD         SYSTEM ID:  [id number]   Comprehensive metabolic panel     Status: Abnormal   Result Value Ref Range    Sodium 141 136 - 145 mmol/L    Potassium 3.9 3.4 - 5.3 mmol/L    Chloride 104 98 - 107 mmol/L    Carbon Dioxide (CO2) 20 (L) 22 - 29 mmol/L    Anion Gap 17 (H) 7 - 15 mmol/L    Urea Nitrogen 22.1 8.0 - 23.0 mg/dL    Creatinine 2.51 (H) 0.67 - 1.17 mg/dL    Calcium 9.3 8.8 - 10.2 mg/dL    Glucose 83 70 - 99 mg/dL    Alkaline Phosphatase 170 (H) 40 - 129 U/L    AST 63 (H) 10 - 50 U/L    ALT 46 10 - 50 U/L    Protein Total 5.9 (L) 6.4 - 8.3 g/dL    Albumin 3.3 (L) 3.5 - 5.2 g/dL    Bilirubin Total 1.0 <=1.2 mg/dL    GFR Estimate 28 (L) >60 mL/min/1.73m2   Ammonia     Status: Normal   Result Value Ref Range    Ammonia 56 16 - 60 umol/L   TSH with free T4 reflex     Status: Normal   Result Value Ref Range    TSH 3.37 0.30 - 4.20 uIU/mL   UA with Microscopic reflex to Culture     Status: Abnormal    Specimen: Urine, Midstream   Result Value Ref Range    Color Urine Yellow Colorless, Straw, Light Yellow, Yellow    Appearance Urine Clear Clear    Glucose Urine Negative Negative mg/dL    Bilirubin Urine Negative Negative    Ketones Urine Negative Negative mg/dL    Specific Gravity Urine 1.011 1.003 - 1.035    Blood Urine Moderate (A) Negative    pH Urine 5.5 5.0 - 7.0    Protein Albumin Urine 10 (A) Negative mg/dL    Urobilinogen Urine Normal Normal, 2.0 mg/dL    Nitrite Urine Negative Negative    Leukocyte Esterase Urine Negative Negative    Mucus Urine Present (A) None Seen /LPF    RBC Urine 5 (H) <=2 /HPF    WBC Urine 2 <=5 /HPF    Narrative    Urine Culture not indicated   CBC with platelets and differential     Status: Abnormal   Result Value Ref Range    WBC Count 13.0 (H) 4.0 - 11.0 10e3/uL    RBC Count 4.55 4.40 - 5.90 10e6/uL    Hemoglobin 13.4 13.3 - 17.7 g/dL    Hematocrit 43.4 40.0 - 53.0 %    MCV 95  78 - 100 fL    MCH 29.5 26.5 - 33.0 pg    MCHC 30.9 (L) 31.5 - 36.5 g/dL    RDW 17.6 (H) 10.0 - 15.0 %    Platelet Count 164 150 - 450 10e3/uL    % Neutrophils 68 %    % Lymphocytes 14 %    % Monocytes 12 %    % Eosinophils 1 %    % Basophils 1 %    % Immature Granulocytes 4 %    NRBCs per 100 WBC 0 <1 /100    Absolute Neutrophils 8.8 (H) 1.6 - 8.3 10e3/uL    Absolute Lymphocytes 1.8 0.8 - 5.3 10e3/uL    Absolute Monocytes 1.6 (H) 0.0 - 1.3 10e3/uL    Absolute Eosinophils 0.2 0.0 - 0.7 10e3/uL    Absolute Basophils 0.1 0.0 - 0.2 10e3/uL    Absolute Immature Granulocytes 0.5 (H) <=0.4 10e3/uL    Absolute NRBCs 0.0 10e3/uL   INR     Status: Abnormal   Result Value Ref Range    INR 1.37 (H) 0.85 - 1.15   Lactic Acid STAT     Status: Abnormal   Result Value Ref Range    Lactic Acid 2.9 (H) 0.7 - 2.0 mmol/L   Asymptomatic COVID-19 Virus (Coronavirus) by PCR Nose     Status: Normal    Specimen: Nose; Swab   Result Value Ref Range    SARS CoV2 PCR Negative Negative    Narrative    Testing was performed using the Xpert Xpress SARS-CoV-2 Assay on the Cepheid Gene-Xpert Instrument Systems. Additional information about this Emergency Use Authorization (EUA) assay can be found via the Lab Guide. This test should be ordered for the detection of SARS-CoV-2 in individuals who meet SARS-CoV-2 clinical and/or epidemiological criteria as well as from individuals without symptoms or other reasons to suspect COVID-19. Test performance for asymptomatic patients has only been established in anterior nasal swab specimens. This test is for in vitro diagnostic use under the FDA EUA for laboratories certified under CLIA to perform high complexity testing. This test has not been FDA cleared or approved. A negative result does not rule out the presence of PCR inhibitors in the specimen or target RNA concentration below the limit of detection for the assay. The possibility of a false negative should be considered if the patient's recent  exposure or clinical presentation suggests COVID-19. This test was validated by [facility]. These Laboratories are certified under the Clinical Laboratory Improvement Amendments (CLIA) as qualified to perform high complexity testing.     Lactic acid whole blood     Status: Abnormal   Result Value Ref Range    Lactic Acid 2.8 (H) 0.7 - 2.0 mmol/L   CBC with platelets differential     Status: Abnormal    Narrative    The following orders were created for panel order CBC with platelets differential.  Procedure                               Abnormality         Status                     ---------                               -----------         ------                     CBC with platelets and d...[[id number]]  Abnormal            Final result                 Please view results for these tests on the individual orders.     Medications   apixaban ANTICOAGULANT (ELIQUIS) tablet 5 mg (has no administration in time range)   bumetanide (BUMEX) tablet 2 mg (has no administration in time range)   calcium carbonate-vitamin D (OSCAL) 500-5 MG-MCG per tablet 1 tablet (has no administration in time range)   folic acid (FOLVITE) tablet 5 mg (has no administration in time range)   metoprolol succinate ER (TOPROL XL) 24 hr tablet 25 mg (has no administration in time range)   pantoprazole (PROTONIX) EC tablet 40 mg (has no administration in time range)   oxyCODONE IR (ROXICODONE) half-tab 2.5-5 mg (has no administration in time range)   predniSONE (DELTASONE) tablet 10 mg (has no administration in time range)   spironolactone (ALDACTONE) tablet 25 mg (has no administration in time range)   vancomycin (VANCOCIN) capsule 125 mg (has no administration in time range)   zinc sulfate (ZINCATE) capsule 220 mg (has no administration in time range)   rifaximin (XIFAXAN) tablet 550 mg (has no administration in time range)   melatonin tablet 1 mg (has no administration in time range)   lidocaine 1 % 0.1-1 mL (has no  administration in time range)   lidocaine (LMX4) cream (has no administration in time range)   sodium chloride (PF) 0.9% PF flush 3 mL (3 mLs Intracatheter Not Given 4/7/23 7446)   sodium chloride (PF) 0.9% PF flush 3 mL (has no administration in time range)   Patient is already receiving anticoagulation with heparin, enoxaparin (LOVENOX), warfarin (COUMADIN)  or other anticoagulant medication (has no administration in time range)   Daily 2 GRAM acetaminophen limit, unless fulminent liver failure or transaminases greater than or equal to 300 - 400, then none (has no administration in time range)   lactulose (CHRONULAC) solution 20 g (has no administration in time range)     Or   lactulose (CHRONULAC) solution for enema prep 100 g (has no administration in time range)   lactulose (CEPHULAC) Packet 30 g (has no administration in time range)   sodium chloride 0.9% infusion ( Intravenous $New Bag 4/7/23 1725)   lactulose (CEPHULAC) Packet 40 g (40 g Oral $Given 4/7/23 1241)     Labs Ordered and Resulted from Time of ED Arrival to Time of ED Departure   COMPREHENSIVE METABOLIC PANEL - Abnormal       Result Value    Sodium 141      Potassium 3.9      Chloride 104      Carbon Dioxide (CO2) 20 (*)     Anion Gap 17 (*)     Urea Nitrogen 22.1      Creatinine 2.51 (*)     Calcium 9.3      Glucose 83      Alkaline Phosphatase 170 (*)     AST 63 (*)     ALT 46      Protein Total 5.9 (*)     Albumin 3.3 (*)     Bilirubin Total 1.0      GFR Estimate 28 (*)    ROUTINE UA WITH MICROSCOPIC REFLEX TO CULTURE - Abnormal    Color Urine Yellow      Appearance Urine Clear      Glucose Urine Negative      Bilirubin Urine Negative      Ketones Urine Negative      Specific Gravity Urine 1.011      Blood Urine Moderate (*)     pH Urine 5.5      Protein Albumin Urine 10 (*)     Urobilinogen Urine Normal      Nitrite Urine Negative      Leukocyte Esterase Urine Negative      Mucus Urine Present (*)     RBC Urine 5 (*)     WBC Urine 2     CBC  WITH PLATELETS AND DIFFERENTIAL - Abnormal    WBC Count 13.0 (*)     RBC Count 4.55      Hemoglobin 13.4      Hematocrit 43.4      MCV 95      MCH 29.5      MCHC 30.9 (*)     RDW 17.6 (*)     Platelet Count 164      % Neutrophils 68      % Lymphocytes 14      % Monocytes 12      % Eosinophils 1      % Basophils 1      % Immature Granulocytes 4      NRBCs per 100 WBC 0      Absolute Neutrophils 8.8 (*)     Absolute Lymphocytes 1.8      Absolute Monocytes 1.6 (*)     Absolute Eosinophils 0.2      Absolute Basophils 0.1      Absolute Immature Granulocytes 0.5 (*)     Absolute NRBCs 0.0     INR - Abnormal    INR 1.37 (*)    LACTIC ACID WHOLE BLOOD - Abnormal    Lactic Acid 2.9 (*)    LACTIC ACID WHOLE BLOOD - Abnormal    Lactic Acid 2.8 (*)    AMMONIA - Normal    Ammonia 56     TSH WITH FREE T4 REFLEX - Normal    TSH 3.37     COVID-19 VIRUS (CORONAVIRUS) BY PCR - Normal    SARS CoV2 PCR Negative     LACTIC ACID WHOLE BLOOD   BLOOD CULTURE   BLOOD CULTURE     CT Head w/o Contrast   Final Result   IMPRESSION:    1. No findings to explain the patient's encephalopathy.   2. Findings of chronic small vessel ischemic disease.   3. Unchanged right anterior inferior frontal lobe gyrus rectus and   orbital gyrus encephalomalacia.      I have personally reviewed the examination and initial interpretation   and I agree with the findings.      ALEX SPAULDING MD            SYSTEM ID:  [id number]      XR Chest 2 Views   Final Result   Impression:    No acute airspace process.      I have personally reviewed the examination and initial interpretation   and I agree with the findings.      DELONTE CANTU MD            SYSTEM ID:  [id number]             Critical care was not performed.     Medical Decision Making  The patient's presentation was of high complexity (an acute health issue posing potential threat to life or bodily function).    The patient's evaluation involved:  review of external note(s) from 1 sources (see separate area of  note for details)  ordering and/or review of 3+ test(s) in this encounter (see separate area of note for details)  review of 3+ test result(s) ordered prior to this encounter (see separate area of note for details)  discussion of management or test interpretation with another health professional (see separate area of note for details)    The patient's management necessitated high risk (a decision regarding hospitalization).      Assessment & Plan    67-year-old man with history of liver failure status post TIPS presenting with confusion for several days.  Differential diagnosis: Hepatic encephalopathy, metabolic encephalopathy, electrolyte abnormalities, unlikely CVA, UTI, pneumonia.    After thorough history and physical exam, patient appears to be in no acute distress, however, he is confused.  Obtain laboratory studies, chest x-ray, CT of the head, and treat him with oral lactulose given the he is likely suffering from hepatic encephalopathy.  He had a visit to the ED earlier this week for the same symptoms and was treated with lactulose and kept overnight until improvement.  He and his wife agree with the plan.    Laboratory studies returned with leukocytosis of 13,000.  There is no anemia, hemoglobin is normal at 13.4. Electrolytes show elevated creatinine of 2.51, however, normal potassium of 3.9.  Ammonia is 56.  Urinalysis shows no evidence of infection.  I still suspect the patient's symptoms are likely secondary to hepatic encephalopathy given his history.  He was given a dose of oral lactulose in the emergency department.  He will be admitted to internal medicine service for further evaluation and management.  He and his wife agree with the plan.      This part of the medical record was transcribed by Randy Ramos, Medical Scribe, from a dictation done by Suzy Hair MD.     I have reviewed the nursing notes. I have reviewed the findings, diagnosis, plan and need for follow up with the  patient.    New Prescriptions    No medications on file       Final diagnoses:   Encephalopathy     Randy REYES, am serving as a trained medical scribe to document services personally performed by Suzy Hair MD, MD, based on the provider's statements to me.     Reginaldo REYES Nikola, MD, MD, was physically present and have reviewed and verified the accuracy of this note documented by Randy Ramos.    Suzy Hair MD  [facility] EMERGENCY DEPARTMENT  4/7/2023     Suzy Hair MD  04/07/23 [id number]

## 2023-04-08 LAB
ANION GAP SERPL CALCULATED.3IONS-SCNC: 13 MMOL/L (ref 7–15)
BASOPHILS # BLD AUTO: 0.1 10E3/UL (ref 0–0.2)
BASOPHILS NFR BLD AUTO: 1 %
BUN SERPL-MCNC: 32.2 MG/DL (ref 8–23)
CALCIUM SERPL-MCNC: 9 MG/DL (ref 8.8–10.2)
CHLORIDE SERPL-SCNC: 106 MMOL/L (ref 98–107)
CREAT SERPL-MCNC: 3.62 MG/DL (ref 0.67–1.17)
DEPRECATED HCO3 PLAS-SCNC: 20 MMOL/L (ref 22–29)
EOSINOPHIL # BLD AUTO: 0.2 10E3/UL (ref 0–0.7)
EOSINOPHIL NFR BLD AUTO: 2 %
ERYTHROCYTE [DISTWIDTH] IN BLOOD BY AUTOMATED COUNT: 17.5 % (ref 10–15)
GFR SERPL CREATININE-BSD FRML MDRD: 18 ML/MIN/1.73M2
GLUCOSE SERPL-MCNC: 81 MG/DL (ref 70–99)
HCT VFR BLD AUTO: 40.6 % (ref 40–53)
HGB BLD-MCNC: 11.9 G/DL (ref 13.3–17.7)
HGB BLD-MCNC: 13 G/DL (ref 13.3–17.7)
IMM GRANULOCYTES # BLD: 0.3 10E3/UL
IMM GRANULOCYTES NFR BLD: 2 %
INR PPP: 1.51 (ref 0.85–1.15)
LACTATE SERPL-SCNC: 2.2 MMOL/L (ref 0.7–2)
LACTATE SERPL-SCNC: 2.5 MMOL/L (ref 0.7–2)
LACTATE SERPL-SCNC: 2.6 MMOL/L (ref 0.7–2)
LYMPHOCYTES # BLD AUTO: 1.6 10E3/UL (ref 0.8–5.3)
LYMPHOCYTES NFR BLD AUTO: 12 %
MCH RBC QN AUTO: 30.1 PG (ref 26.5–33)
MCHC RBC AUTO-ENTMCNC: 32 G/DL (ref 31.5–36.5)
MCV RBC AUTO: 94 FL (ref 78–100)
MONOCYTES # BLD AUTO: 1.2 10E3/UL (ref 0–1.3)
MONOCYTES NFR BLD AUTO: 9 %
NEUTROPHILS # BLD AUTO: 10.6 10E3/UL (ref 1.6–8.3)
NEUTROPHILS NFR BLD AUTO: 74 %
NRBC # BLD AUTO: 0 10E3/UL
NRBC BLD AUTO-RTO: 0 /100
PLATELET # BLD AUTO: 180 10E3/UL (ref 150–450)
POTASSIUM SERPL-SCNC: 4 MMOL/L (ref 3.4–5.3)
RBC # BLD AUTO: 4.32 10E6/UL (ref 4.4–5.9)
SODIUM SERPL-SCNC: 139 MMOL/L (ref 136–145)
WBC # BLD AUTO: 14.1 10E3/UL (ref 4–11)

## 2023-04-08 PROCEDURE — 83605 ASSAY OF LACTIC ACID: CPT

## 2023-04-08 PROCEDURE — 85025 COMPLETE CBC W/AUTO DIFF WBC: CPT

## 2023-04-08 PROCEDURE — 99232 SBSQ HOSP IP/OBS MODERATE 35: CPT | Mod: GC | Performed by: INTERNAL MEDICINE

## 2023-04-08 PROCEDURE — 85610 PROTHROMBIN TIME: CPT

## 2023-04-08 PROCEDURE — 36415 COLL VENOUS BLD VENIPUNCTURE: CPT

## 2023-04-08 PROCEDURE — 87324 CLOSTRIDIUM AG IA: CPT | Performed by: HOSPITALIST

## 2023-04-08 PROCEDURE — 250N000013 HC RX MED GY IP 250 OP 250 PS 637

## 2023-04-08 PROCEDURE — 250N000012 HC RX MED GY IP 250 OP 636 PS 637

## 2023-04-08 PROCEDURE — 80048 BASIC METABOLIC PNL TOTAL CA: CPT

## 2023-04-08 PROCEDURE — 85018 HEMOGLOBIN: CPT

## 2023-04-08 PROCEDURE — 87493 C DIFF AMPLIFIED PROBE: CPT | Performed by: HOSPITALIST

## 2023-04-08 PROCEDURE — 120N000002 HC R&B MED SURG/OB UMMC

## 2023-04-08 PROCEDURE — 99222 1ST HOSP IP/OBS MODERATE 55: CPT | Mod: GC | Performed by: STUDENT IN AN ORGANIZED HEALTH CARE EDUCATION/TRAINING PROGRAM

## 2023-04-08 RX ORDER — LACTULOSE 10 G/10G
30 SOLUTION ORAL 2 TIMES DAILY
Status: DISCONTINUED | OUTPATIENT
Start: 2023-04-08 | End: 2023-04-09

## 2023-04-08 RX ORDER — VANCOMYCIN HYDROCHLORIDE 125 MG/1
125 CAPSULE ORAL EVERY OTHER DAY
Status: DISCONTINUED | OUTPATIENT
Start: 2023-04-09 | End: 2023-04-09

## 2023-04-08 RX ADMIN — FOLIC ACID 5 MG: 1 TABLET ORAL at 07:54

## 2023-04-08 RX ADMIN — RIFAXIMIN 550 MG: 550 TABLET ORAL at 07:54

## 2023-04-08 RX ADMIN — PREDNISONE 10 MG: 10 TABLET ORAL at 07:54

## 2023-04-08 RX ADMIN — SPIRONOLACTONE 25 MG: 25 TABLET, FILM COATED ORAL at 07:54

## 2023-04-08 RX ADMIN — Medication 1 TABLET: at 07:54

## 2023-04-08 RX ADMIN — ZINC SULFATE 220 MG (50 MG) CAPSULE 220 MG: CAPSULE at 07:54

## 2023-04-08 RX ADMIN — RIFAXIMIN 550 MG: 550 TABLET ORAL at 19:48

## 2023-04-08 RX ADMIN — Medication 1 TABLET: at 19:48

## 2023-04-08 RX ADMIN — PANTOPRAZOLE SODIUM 40 MG: 40 TABLET, DELAYED RELEASE ORAL at 07:54

## 2023-04-08 RX ADMIN — APIXABAN 5 MG: 5 TABLET, FILM COATED ORAL at 07:54

## 2023-04-08 RX ADMIN — APIXABAN 5 MG: 5 TABLET, FILM COATED ORAL at 19:48

## 2023-04-08 RX ADMIN — METOPROLOL SUCCINATE 25 MG: 25 TABLET, EXTENDED RELEASE ORAL at 14:02

## 2023-04-08 RX ADMIN — BUMETANIDE 2 MG: 2 TABLET ORAL at 07:54

## 2023-04-08 RX ADMIN — PANTOPRAZOLE SODIUM 40 MG: 40 TABLET, DELAYED RELEASE ORAL at 19:48

## 2023-04-08 ASSESSMENT — ACTIVITIES OF DAILY LIVING (ADL)
ADLS_ACUITY_SCORE: 19

## 2023-04-08 NOTE — PROGRESS NOTES
[facility]    Progress Note - Medicine Service, [place] TEAM 3       Date of Admission:  4/7/2023    Assessment & Plan   Damion Quinones is a 67 year old male admitted on 4/7/2023. He has a history of paroxysmal atrial fibrillation (on Apixiban), ESRD on HD, ESLD s/p TIPS (10/22), recurrent c diff, hereditary hemochromatosis who is presented to the ED with confusion and is admitted for altered mental status most likely secondary to hepatic encephalopathy whose mental status has improved throughout hospital stay.     #Altered mental status 2/2 hepatic encephalopathy   #Decompensated cirrhosis 2/2 alcohol use and hereditary hemochromatosis s/p TIPS 10/22 decompensated by recurrent hepatic encephalopathy  Patient with multiple recent ED visits and hospitalizations for hepatic encephalopathy. States since having C diff colitis and starting dialysis he has been having frequent loose bowel movements > 3 daily so has not been using lactulose consistently. CXR, UA in ED not consistent with pneumonia or UTI. CT head obtained and negative for acute intracranial process.Current episode of altered mental status likely recurrent hepatic encephalopathy as mental status improved following dialysis and dose of lactulose. Now back to baseline. Appreciate hepatology recommendations given frequency of episodes of hepatic encephalopathy despite multiple bowel movements.   - Continue Lactulose 30mg BID PRN, hold for >3 loose stools   - Continue [place] Protocol as needed  - Continue Spironolactone 25 mg every day  - Hold PTA budesonide 2mg BID given elevated lactate  - Continue Rifaximin 550mg BID  - Continue zinc sulfate 220mg every day  - Continue calcium, folate supplementation  - Appreciate hepatology recommendations     # Leukocytosis  # C. difficile colitis  Patient on prolonged PO vanc taper expected to end 4/11 for recurrent C diff colitis. New leukocytosis on labs this  "admission. CXR not concerning for acute process, UA not concerning for infection. No new rashes, denies abdominal pain, no significant ascites on exam. Will repeat C diff sample while in patient to ensure colitis is no longer active. Continue to monitor off antibiotics at this time.  - Continue vancomycin 125mg every other day until 4/11  - Follow-up blood cultures     #PAF  #Hx DVT  -PTA apixaban 5mg BID  -PTA metoprolol 25mg every day     #Hx Gout  -PTA prednisone 10mg every day     #GERD  -Therapeutic interchange pantoprazole 40mg BID from omeprazole 40mg BID     Diet: Renal Diet (dialysis)    DVT Prophylaxis: DOAC  Lux Catheter: Not present  Fluids: None  Lines: PRESENT           Cardiac Monitoring: None  Code Status: Full Code      Clinically Significant Risk Factors Present on Admission              # Hypoalbuminemia: Lowest albumin = 3.3 g/dL at 4/7/2023 11:50 AM, will monitor as appropriate  # Drug Induced Coagulation Defect: home medication list includes an anticoagulant medication         # Obesity: Estimated body mass index is 33.15 kg/m  as calculated from the following:    Height as of 3/21/23: 1.727 m (5' 7.99\").    Weight as of this encounter: 98.9 kg (218 lb).           Disposition Plan     Expected Discharge Date: 04/09/2023                The patient's care was discussed with the Attending Physician, Dr. Samuels.    Vi Esteban MD  Medicine Service, [facility] TEAM 3  [facility]  Securely message with [place] (more info)  Text page via [facility] Paging/Directory   See signed in provider for up to date coverage information  ______________________________________________________________________    Interval History   Care team notes reviewed. Patient had 4-5 loose bowel movements overnight. Mental status significantly improved this morning. Per wife, appears to be at baseline. Denies abdominal pain, fevers, cough, urinary symptoms. Planning to watch the Masters " today. No other concerns to address with the team this morning.     Physical Exam   Vital Signs: Temp: 98.3  F (36.8  C) Temp src: Oral BP: 111/74 Pulse: 97   Resp: 15 SpO2: 100 % O2 Device: None (Room air)    Weight: 218 lbs 0 oz    Physical Exam  Vitals reviewed.   Constitutional:       General: He is not in acute distress.     Appearance: Normal appearance.   HENT:      Head: Normocephalic and atraumatic.   Eyes:      Extraocular Movements: Extraocular movements intact.      Conjunctiva/sclera: Conjunctivae normal.   Cardiovascular:      Rate and Rhythm: Normal rate and regular rhythm.   Pulmonary:      Effort: Pulmonary effort is normal.      Breath sounds: Normal breath sounds.   Abdominal:      General: There is distension.      Palpations: Abdomen is soft.      Tenderness: There is no abdominal tenderness.      Hernia: A hernia is present.   Musculoskeletal:      Right lower leg: No edema.      Left lower leg: No edema.   Skin:     General: Skin is warm and dry.   Neurological:      General: No focal deficit present.      Mental Status: He is alert and oriented to person, place, and time.   Psychiatric:         Mood and Affect: Mood normal.         Behavior: Behavior normal.       Medical Decision Making       Please see A&P for additional details of medical decision making.      Data     I have personally reviewed the following data over the past 24 hrs:    14.1 (H)  \   13.0 (L)   / 180     139 106 32.2 (H) /  81   4.0 20 (L) 3.62 (H) \       ALT: 46 AST: 63 (H) AP: 170 (H) TBILI: 1.0   ALB: 3.3 (L) TOT PROTEIN: 5.9 (L) LIPASE: N/A       TSH: 3.37 T4: N/A A1C: N/A       Procal: N/A CRP: N/A Lactic Acid: 2.5 (H)       INR:  1.51 (H) PTT:  N/A   D-dimer:  N/A Fibrinogen:  N/A       Imaging results reviewed over the past 24 hrs:   Recent Results (from the past 24 hour(s))   XR Chest 2 Views    Narrative    Examination: XR CHEST 2 VIEWS 4/7/2023 1:24 PM    Indication: Altered mental status    Comparison: CT  3/10/2023 and x-ray 2/13/2023    Findings:  PA and lateral views of the chest. Trachea is midline. Cardiac  silhouette is within normal limits. Prominent aortic knob. Left-sided  dual-lumen CVC tip terminates over the high right atrium. No  significant pleural effusion or appreciable pneumothorax. No focal  consolidation or pleural airspace opacities. Endovascular coiling over  the epigastric region. Degenerative changes in visualized spine and  shoulder joints. No acute osseous abnormality.      Impression    Impression:   No acute airspace process.    I have personally reviewed the examination and initial interpretation  and I agree with the findings.    DELONTE CANTU MD         SYSTEM ID:  [id number]   CT Head w/o Contrast    Narrative    EXAM: CT HEAD W/O CONTRAST  4/7/2023 1:22 PM     HISTORY:  Altered mental status       COMPARISON:  Head CT 1/23/2023.    TECHNIQUE: Using multidetector thin collimation helical acquisition  technique, axial, coronal and sagittal CT images from the skull base  to the vertex were obtained without intravenous contrast.   (topogram) image(s) also obtained and reviewed.    FINDINGS:  No acute intracranial hemorrhage, mass effect, or midline shift. No  acute loss of gray-white matter differentiation in the cerebral  hemispheres. Chronic encephalomalacia in the anterior inferior right  frontal lobe gyrus rectus and orbital gyrus. Ventricles are  proportionate to the cerebral sulci. Clear basal cisterns. Scattered  periventricular hypoattenuating foci, as can be seen in chronic small  vessel ischemic disease.     The bony calvaria and the bones of the skull base are normal. The  visualized portions of the paranasal sinuses and mastoid air cells are  clear. Grossly normal orbits. Bilateral pseudophakia.      Impression    IMPRESSION:   1. No findings to explain the patient's encephalopathy.  2. Findings of chronic small vessel ischemic disease.  3. Unchanged right anterior inferior  frontal lobe gyrus rectus and  orbital gyrus encephalomalacia.    I have personally reviewed the examination and initial interpretation  and I agree with the findings.    ALEX SPAULDING MD         SYSTEM ID:  [id number]

## 2023-04-08 NOTE — PROGRESS NOTES
Admission/Transfer from: [facility]  2 RN skin assessment completed. YES; Cindy WOOD & Katy FARMER  Significant findings include: blanchable redness to sacrum and bilateral elbows (more significant on R elbow), scattered bruising, skin tear on R hand covered with primapore, skin tear on L arm covered with Band-Aid, overall karli skin color in appearance  [facility] Nurse Consult Ordered? NO

## 2023-04-08 NOTE — CONSULTS
[facility]    Hepatology Consult    Requesting provider: Dr. Esteban     Consult requested for  Hepatic encephalopathy     Assessment  67 year old male with decompensated alcohol related cirrhosis (under liver and kidney transplant evaluation), ESRD on HD, gout, atrial fibrillation, history of gastric variceal bleeding status post TIPS, right upper extremity DVT, psoriatic arthritis, recurrent C. difficile infection currently on vancomycin therapy who was brought to the ER because of confusion.    Hepatic encephalopathy  Has improved with lactulose overnight, no obvious precipitant, infective work up has been negative so far. Likely cause is non complaince with lactulose. We discussed with his wife and suggested to use lactulose atleast once daily as lactulose will have additional benefits beyond catharsis (for example altering ph and trapping ammonia within the gut) in the setting of hepatic encephaloapthy      Leucocytosis   History of recurrent C diff infection  Has history of recurrent C diff infection (12/2022, 1/2023 and 2/2023) . He was seen by ID and is currently on a prophylactic dose of vancomycin. He reports diarrhea now which could be due to lactulose but on other hand could indicate active c diff infection. If this indeed active C diff infection, he would need additional dose of vancomycin in addition to the prophylactic dose that he is on now.     Decompensated alcohol/heamotchromatosis related cirrhosis , MELD Na 24, O +ve   Ascites : none clinically, makes some urine and is on bumetanide and Spirinolactone as an outpatient  HE :See above   Esophageal varices: history of gastric variceal bleeding s/p TIPS in 10/2022  PATI: ESRD  USG abdomen   Infective work up: Blood culture in progress, Xray chest negative   Outpatient hepatologist : Dr. Poole/Dr. Bermudez  For transplant, Dr. Beaulieu at [facility] for primary hepatology care    Recommendations  - C diff testing, if positive will need  additional doses of vancomycin  - Follow infective work up, blood cultures in progress, paracentesis of any ascites   - Continue lactulose targeting for 2-3 bowel movements in a day   - Will likely need lactulose atleast once daily (as an outpatient) despite adequate bowel movements to facilitate colonic trapping of ammonia.     Discussed with Dr. Broussard and reccomendations were conveyed to primary team       HPI:  67 year old male with decompensated alcohol related cirrhosis (under liver and kidney transplant evaluation), ESRD on HD, gout, atrial fibrillation, history of gastric variceal bleeding status post TIPS, right upper extremity DVT, psoriatic arthritis, recurrent C. difficile infection currently on vancomycin therapy who was brought to the ER because of confusion.    Patient has been dealing with hepatic encephalopathy since he had TIPS in October 2022.  He had few hospital admissions for this issue.  He was supposed to be on lactulose rifaximin and zinc as an outpatient but due to the lactulose precipitating his diarrhea and him having recurrent bouts of C. difficile it is currently on hold.  Wife reports that even the lactulose is on hold, patient continues to have 3-4 formed bowel movements per day.  He was apparently doing well for the past few days with no bouts of encephalopathy but last Monday he had confusion and was taken to [place] ER.  While he was there serum ammonia level was found to be elevated to 96, he was given few doses of lactulose overnight and was back to his baseline by morning and was discharged home.  He did okay until Thursday and then started having recurrent confusion again.  Wife is hoping that this would get better after dialysis hence she took him for dialysis on Friday but the confusion persisted and hence he was brought to the ED.    No history of any fevers or chills.  No history of any shortness of breath/chest pain/cough.  No nausea/vomiting/abdominal pain.  No  hematochezia/hematemesis/melena.    In the ED he underwent CT imaging of the head which was negative for any acute abnormalities.  Chest x-ray was negative for any infection.  There was evidence of leukocytosis on the labs but no obvious source of infection.  Patient was started on [place] protocol and admitted for further management.    He has received a couple of doses of lactulose overnight and by morning there was significant improvement in his mental status.  Wife reports that he did have diarrhea prior to arrival but after lactulose he started having multiple loose bowel movements.       Medical hx Surgical hx   Past Medical History:   Diagnosis Date     Alcoholic cirrhosis of liver with ascites (H) 10/11/2019     Arthritis     RA     C. difficile colitis      Chronic kidney disease      Coronary artery disease     AFib 2016     Diabetes mellitus type 2, uncomplicated (H) 10/11/2019     History of blood transfusion     2016     History of hemochromatosis 10/11/2019     Hypertension      Hypokalemia      Obesity      PAF (paroxysmal atrial fibrillation) (H)      Psoriatic arthritis (H)       Past Surgical History:   Procedure Laterality Date     APPENDECTOMY      Removed at 18 Years Old      COLONOSCOPY      2014 at [facility].      EYE SURGERY Bilateral     Cataract     HERNIA REPAIR      History of bilateral inguinal hernia repair: 10/28/2014. Open hernia repair: 10/2017. Abdominal wound exploration and debridement 12/27/2017     INSERT SHUNT PORTAL TRANSJUGULAR INTRAHEPTIC  09/26/2022     IR CVC TUNNEL PLACEMENT > 5 YRS OF AGE  01/26/2023          Medications  Current Facility-Administered Medications   Medication Dose Route Frequency     apixaban ANTICOAGULANT  5 mg Oral BID     [Held by provider] bumetanide  2 mg Oral BID     calcium carbonate-vitamin D  1 tablet Oral BID     folic acid  5 mg Oral Daily     lactulose  30 g Oral TID     metoprolol succinate ER  25 mg Oral Q24H     pantoprazole  40 mg  Oral BID     predniSONE  10 mg Oral Daily     rifaximin  550 mg Oral BID     sodium chloride (PF)  3 mL Intracatheter Q8H     spironolactone  25 mg Oral Daily     [START ON 4/9/2023] vancomycin  125 mg Oral Every Other Day     zinc sulfate  220 mg Oral Daily       Allergies  No Known Allergies    Family hx Social hx   Family History   Problem Relation Age of Onset     Lung Cancer Mother      Colon Cancer Father      Lung Cancer Brother      Heart Failure Brother      Kidney Disease Brother       Social History     Tobacco Use     Smoking status: Never     Smokeless tobacco: Never   Vaping Use     Vaping status: Never Used   Substance Use Topics     Alcohol use: Not Currently     Comment: Last ETOH use was 12/31/2021     Drug use: Not Currently          Review of systems  A 10-point review of systems was negative.      Examination  /74 (BP Location: Left arm)   Pulse 97   Temp 98.3  F (36.8  C) (Oral)   Resp 15   Wt 98.9 kg (218 lb)   SpO2 100%   BMI 33.15 kg/m      Intake/Output Summary (Last 24 hours) at 4/8/2023 0902  Last data filed at 4/7/2023 2039  Gross per 24 hour   Intake 600 ml   Output 240 ml   Net 360 ml       Gen- well, NAD, A+Ox3, normal color  Eye-sclera anicteric  ENT-hearing intact  CVS- RRR  RS-nonlabored breathing  Abd-soft, nondistended, nontender  Extr- no JESSICA  Neuro-alert, calm and oriented, no asterixis  Skin- no rash  Psych- normal mood    Laboratory  Lab Results   Component Value Date     04/08/2023    POTASSIUM 4.0 04/08/2023    POTASSIUM 4.6 08/27/2019    CHLORIDE 106 04/08/2023    CO2 20 04/08/2023    BUN 32.2 04/08/2023    CR 3.62 04/08/2023    CR 1.11 08/27/2019       Lab Results   Component Value Date    BILITOTAL 1.0 04/07/2023    ALT 46 04/07/2023    AST 63 04/07/2023    ALKPHOS 170 04/07/2023       Lab Results   Component Value Date    ALBUMIN 3.3 04/07/2023    PROTTOTAL 5.9 04/07/2023        Lab Results   Component Value Date    WBC 14.1 04/08/2023    HGB 13.0  04/08/2023    MCV 94 04/08/2023     04/08/2023       Lab Results   Component Value Date    INR 1.51 04/08/2023       Radiology    CT head without contrast 4/7/2023    IMPRESSION:   1. No findings to explain the patient's encephalopathy.  2. Findings of chronic small vessel ischemic disease.  3. Unchanged right anterior inferior frontal lobe gyrus rectus and  orbital gyrus encephalomalacia.               Antoni Mcnair MD  Hepatology  #

## 2023-04-08 NOTE — MEDICATION SCRIBE - ADMISSION MEDICATION HISTORY
Medication Scribe Admission Medication History    Admission medication history is complete. The information provided in this note is only as accurate as the sources available at the time of the update.    Medication reconciliation/reorder completed by provider prior to medication history? No    Information Source(s): Patient via in-person    Pertinent Information:   Patient's wife and medication scribe reviewed patients prior to admit medication list. Patient is taking vancomycin 125 mg every other day    Changes made to PTA medication list:    Added: None    Deleted: None    Changed: None    Medication Affordability:  Not including over the counter (OTC) medications, was there a time in the past 12 months when you did not take your medications as prescribed because of cost?: No    Allergies reviewed with patient and updates made in EHR: yes    Medication History Completed By: Komal Flannery 4/7/2023 9:24 PM    PTA Med List   Medication Sig Note Last Dose     apixaban ANTICOAGULANT (ELIQUIS) 5 MG tablet Take 1 tablet (5 mg) by mouth 2 times daily  4/7/2023 at am     bumetanide (BUMEX) 1 MG tablet Take 2 tablets (2 mg) by mouth 2 times daily for 180 days  4/7/2023 at am     Calcium Carbonate-Vitamin D 500-3.125 MG-MCG TABS Take 1 tablet by mouth 2 times daily  4/6/2023 at am     folic acid (FOLVITE) 1 MG tablet TAKE FIVE TABLETS BY MOUTH EVERY DAY  4/7/2023 at am     metoprolol succinate ER (TOPROL XL) 25 MG 24 hr tablet Take 1 tablet by mouth daily at 2 pm  4/7/2023 at am     omeprazole 20 MG tablet Take 40 mg by mouth 2 times daily  4/7/2023 at am     oxyCODONE (ROXICODONE) 5 MG tablet Take half to one tablet PO Q 8 hours as needed for pain.  Unknown at as needed     predniSONE (DELTASONE) 5 MG tablet Take 10 mg by mouth daily  4/7/2023 at am     spironolactone (ALDACTONE) 50 MG tablet Take 25 mg by mouth daily  4/6/2023 at am     vancomycin (VANCOCIN) 125 MG capsule Take 1 tab 2x/day for 2wks, then 1 tab 1x/day  for 2 wks, then 1 tab every other day for 2 wks, then stop. Increase to 4x/day in the event of CDI symptoms 4/7/2023: He is taking this medication every other day Unknown at unknown     XIFAXAN 550 MG TABS tablet Take 550 mg by mouth 2 times daily  4/7/2023 at am     zinc sulfate (ZINCATE) 220 (50 Zn) MG capsule Take 1 capsule (220 mg) by mouth daily  4/7/2023 at am

## 2023-04-08 NOTE — PLAN OF CARE
Goal Outcome Evaluation:      Plan of Care Reviewed With: patient    Overall Patient Progress: improvingOverall Patient Progress: improving    Outcome Evaluation: VSS on room air. Braithwaite score 0. A/O x4. Up ad ari to bathroom. Pt reported 4-5 BMs overnight. PIV SL. Renal diet. Trending LA; Am lactic 2.6. Continue to monitor and follow POC.

## 2023-04-08 NOTE — PLAN OF CARE
Goal Outcome Evaluation:      Plan of Care Reviewed With: patient    Overall Patient Progress: improvingOverall Patient Progress: improving    Outcome Evaluation: Pt A&Ox4, VSS on RA.  scoring 0 on WH.  pt had 8 BM today, no lactulose given, MD aware.  Lactic acid 2.2.  pt reported blood per rectum, MD notified and at bedside to assess.  re-check hbg 11.9 this evening, no other evidence of bleeding.

## 2023-04-09 ENCOUNTER — APPOINTMENT (OUTPATIENT)
Dept: ULTRASOUND IMAGING | Facility: CLINIC | Age: 68
End: 2023-04-09
Payer: COMMERCIAL

## 2023-04-09 ENCOUNTER — APPOINTMENT (OUTPATIENT)
Dept: PHYSICAL THERAPY | Facility: CLINIC | Age: 68
End: 2023-04-09
Payer: COMMERCIAL

## 2023-04-09 LAB
ANION GAP SERPL CALCULATED.3IONS-SCNC: 12 MMOL/L (ref 7–15)
BASOPHILS # BLD AUTO: 0.1 10E3/UL (ref 0–0.2)
BASOPHILS NFR BLD AUTO: 1 %
BUN SERPL-MCNC: 50.6 MG/DL (ref 8–23)
C DIFF GDH STL QL IA: POSITIVE
C DIFF TOX A+B STL QL IA: NEGATIVE
C DIFF TOX B STL QL: POSITIVE
CALCIUM SERPL-MCNC: 8.8 MG/DL (ref 8.8–10.2)
CHLORIDE SERPL-SCNC: 106 MMOL/L (ref 98–107)
CREAT SERPL-MCNC: 4.76 MG/DL (ref 0.67–1.17)
DEPRECATED HCO3 PLAS-SCNC: 21 MMOL/L (ref 22–29)
EOSINOPHIL # BLD AUTO: 0.2 10E3/UL (ref 0–0.7)
EOSINOPHIL NFR BLD AUTO: 2 %
ERYTHROCYTE [DISTWIDTH] IN BLOOD BY AUTOMATED COUNT: 17.6 % (ref 10–15)
GFR SERPL CREATININE-BSD FRML MDRD: 13 ML/MIN/1.73M2
GLUCOSE BLDC GLUCOMTR-MCNC: 122 MG/DL (ref 70–99)
GLUCOSE SERPL-MCNC: 75 MG/DL (ref 70–99)
HCT VFR BLD AUTO: 34.7 % (ref 40–53)
HGB BLD-MCNC: 10.9 G/DL (ref 13.3–17.7)
HGB BLD-MCNC: 11.6 G/DL (ref 13.3–17.7)
IMM GRANULOCYTES # BLD: 0.3 10E3/UL
IMM GRANULOCYTES NFR BLD: 3 %
INR PPP: 1.89 (ref 0.85–1.15)
LACTATE SERPL-SCNC: 1.3 MMOL/L (ref 0.7–2)
LACTATE SERPL-SCNC: 1.8 MMOL/L (ref 0.7–2)
LYMPHOCYTES # BLD AUTO: 1.4 10E3/UL (ref 0.8–5.3)
LYMPHOCYTES NFR BLD AUTO: 14 %
MCH RBC QN AUTO: 29.5 PG (ref 26.5–33)
MCHC RBC AUTO-ENTMCNC: 31.4 G/DL (ref 31.5–36.5)
MCV RBC AUTO: 94 FL (ref 78–100)
MONOCYTES # BLD AUTO: 1 10E3/UL (ref 0–1.3)
MONOCYTES NFR BLD AUTO: 10 %
NEUTROPHILS # BLD AUTO: 7.3 10E3/UL (ref 1.6–8.3)
NEUTROPHILS NFR BLD AUTO: 70 %
NRBC # BLD AUTO: 0 10E3/UL
NRBC BLD AUTO-RTO: 0 /100
PLATELET # BLD AUTO: 133 10E3/UL (ref 150–450)
POTASSIUM SERPL-SCNC: 3.5 MMOL/L (ref 3.4–5.3)
RBC # BLD AUTO: 3.7 10E6/UL (ref 4.4–5.9)
SODIUM SERPL-SCNC: 139 MMOL/L (ref 136–145)
WBC # BLD AUTO: 10.2 10E3/UL (ref 4–11)

## 2023-04-09 PROCEDURE — 250N000012 HC RX MED GY IP 250 OP 636 PS 637

## 2023-04-09 PROCEDURE — 93975 VASCULAR STUDY: CPT | Mod: 26 | Performed by: RADIOLOGY

## 2023-04-09 PROCEDURE — 99232 SBSQ HOSP IP/OBS MODERATE 35: CPT | Mod: GC | Performed by: INTERNAL MEDICINE

## 2023-04-09 PROCEDURE — 80053 COMPREHEN METABOLIC PANEL: CPT

## 2023-04-09 PROCEDURE — 250N000013 HC RX MED GY IP 250 OP 250 PS 637

## 2023-04-09 PROCEDURE — 97161 PT EVAL LOW COMPLEX 20 MIN: CPT | Mod: GP

## 2023-04-09 PROCEDURE — 82248 BILIRUBIN DIRECT: CPT

## 2023-04-09 PROCEDURE — 85018 HEMOGLOBIN: CPT

## 2023-04-09 PROCEDURE — 99254 IP/OBS CNSLTJ NEW/EST MOD 60: CPT | Mod: FS | Performed by: PHYSICIAN ASSISTANT

## 2023-04-09 PROCEDURE — 83605 ASSAY OF LACTIC ACID: CPT

## 2023-04-09 PROCEDURE — 36415 COLL VENOUS BLD VENIPUNCTURE: CPT

## 2023-04-09 PROCEDURE — 93975 VASCULAR STUDY: CPT

## 2023-04-09 PROCEDURE — 97530 THERAPEUTIC ACTIVITIES: CPT | Mod: GP

## 2023-04-09 PROCEDURE — 120N000002 HC R&B MED SURG/OB UMMC

## 2023-04-09 PROCEDURE — 97116 GAIT TRAINING THERAPY: CPT | Mod: GP

## 2023-04-09 PROCEDURE — 85610 PROTHROMBIN TIME: CPT

## 2023-04-09 PROCEDURE — 97110 THERAPEUTIC EXERCISES: CPT | Mod: GP

## 2023-04-09 RX ORDER — LACTULOSE 10 G/10G
30 SOLUTION ORAL DAILY
Status: DISCONTINUED | OUTPATIENT
Start: 2023-04-10 | End: 2023-04-10 | Stop reason: HOSPADM

## 2023-04-09 RX ORDER — VANCOMYCIN HYDROCHLORIDE 125 MG/1
125 CAPSULE ORAL 4 TIMES DAILY
Status: DISCONTINUED | OUTPATIENT
Start: 2023-04-09 | End: 2023-04-10

## 2023-04-09 RX ADMIN — LACTULOSE 30 G: 10 POWDER, FOR SOLUTION ORAL at 08:11

## 2023-04-09 RX ADMIN — APIXABAN 5 MG: 5 TABLET, FILM COATED ORAL at 20:20

## 2023-04-09 RX ADMIN — VANCOMYCIN HYDROCHLORIDE 125 MG: 125 CAPSULE ORAL at 08:11

## 2023-04-09 RX ADMIN — VANCOMYCIN HYDROCHLORIDE 125 MG: 125 CAPSULE ORAL at 20:20

## 2023-04-09 RX ADMIN — APIXABAN 5 MG: 5 TABLET, FILM COATED ORAL at 08:11

## 2023-04-09 RX ADMIN — Medication 1 TABLET: at 08:11

## 2023-04-09 RX ADMIN — VANCOMYCIN HYDROCHLORIDE 125 MG: 125 CAPSULE ORAL at 15:58

## 2023-04-09 RX ADMIN — PANTOPRAZOLE SODIUM 40 MG: 40 TABLET, DELAYED RELEASE ORAL at 20:20

## 2023-04-09 RX ADMIN — VANCOMYCIN HYDROCHLORIDE 125 MG: 125 CAPSULE ORAL at 12:45

## 2023-04-09 RX ADMIN — PREDNISONE 10 MG: 10 TABLET ORAL at 08:11

## 2023-04-09 RX ADMIN — RIFAXIMIN 550 MG: 550 TABLET ORAL at 08:11

## 2023-04-09 RX ADMIN — BUMETANIDE 2 MG: 2 TABLET ORAL at 08:11

## 2023-04-09 RX ADMIN — FOLIC ACID 5 MG: 1 TABLET ORAL at 08:11

## 2023-04-09 RX ADMIN — METOPROLOL SUCCINATE 25 MG: 25 TABLET, EXTENDED RELEASE ORAL at 15:55

## 2023-04-09 RX ADMIN — BUMETANIDE 2 MG: 2 TABLET ORAL at 20:20

## 2023-04-09 RX ADMIN — ZINC SULFATE 220 MG (50 MG) CAPSULE 220 MG: CAPSULE at 08:11

## 2023-04-09 RX ADMIN — Medication 1 TABLET: at 20:20

## 2023-04-09 RX ADMIN — SPIRONOLACTONE 25 MG: 25 TABLET, FILM COATED ORAL at 08:11

## 2023-04-09 RX ADMIN — RIFAXIMIN 550 MG: 550 TABLET ORAL at 20:20

## 2023-04-09 RX ADMIN — PANTOPRAZOLE SODIUM 40 MG: 40 TABLET, DELAYED RELEASE ORAL at 08:11

## 2023-04-09 ASSESSMENT — ACTIVITIES OF DAILY LIVING (ADL)
ADLS_ACUITY_SCORE: 19
ADLS_ACUITY_SCORE: 19
DEPENDENT_IADLS:: INDEPENDENT
ADLS_ACUITY_SCORE: 19

## 2023-04-09 NOTE — PROGRESS NOTES
[facility]    Medicine Inpatient Note - Medicine Service, [place] TEAM 3       Date of Admission:  4/7/2023    Assessment & Plan   Damion Quinones is a 67 year old male admitted on 4/7/2023. He has a history of paroxysmal atrial fibrillation (on Apixiban), ESRD on HD, ESLD s/p TIPS (10/22), recurrent c diff, hereditary hemochromatosis who is presented to the ED with confusion and is admitted for altered mental status most likely secondary to hepatic encephalopathy whose mental status has improved throughout hospital stay. C. Diff positive 4/9.    Changes today:  - RUQ US w/ doppler (assess TIPs and any ascites)  - c. Diff positive  - Per GI, not a candidate for fecal transplant  - Dialysis tomorrow (neph putting on schedule)    #Altered mental status 2/2 hepatic encephalopathy   #Decompensated cirrhosis 2/2 alcohol use and hereditary hemochromatosis s/p TIPS 10/22 decompensated by recurrent hepatic encephalopathy  Patient with multiple recent ED visits and hospitalizations for hepatic encephalopathy. States since having C diff colitis and starting dialysis he has been having frequent loose bowel movements > 3 daily so has not been using lactulose consistently. CXR, UA in ED not consistent with pneumonia or UTI. CT head obtained and negative for acute intracranial process.Current episode of altered mental status likely recurrent hepatic encephalopathy as mental status improved following dialysis and dose of lactulose. Now back to baseline. Appreciate hepatology recommendations given frequency of episodes of hepatic encephalopathy despite multiple bowel movements.  - RUQ US w/ doppler (assess TIPs and any ascites)  - Continue Lactulose 30mg PRN, hold for >3 loose stools   - Continue [place] Protocol as needed  - Continue Spironolactone 25 mg every day  - Hold PTA budesonide 2mg BID given elevated lactate  - Continue Rifaximin 550mg BID  - Continue zinc sulfate 220mg every  day  - Continue calcium, folate supplementation  - Appreciate hepatology recommendations     # Leukocytosis  # C. difficile colitis  Patient on prolonged PO vanc taper expected to end 4/11 for recurrent C diff colitis. New leukocytosis on labs this admission. CXR not concerning for acute process, UA not concerning for infection. No new rashes, denies abdominal pain, no significant ascites on exam. Will repeat C diff sample while in patient to ensure colitis is no longer active. Continue to monitor off antibiotics at this time.  - vancomycin 125mg QID  - ID consulted, appreciate recs  - Follow-up blood cultures     #PAF  #Hx DVT  -PTA apixaban 5mg BID  -PTA metoprolol 25mg every day     #Hx Gout  -PTA prednisone 10mg every day     #GERD  -Therapeutic interchange pantoprazole 40mg BID from omeprazole 40mg BID     Diet: Renal Diet (dialysis)    DVT Prophylaxis: DOAC  Lux Catheter: Not present  Fluids: None  Lines: CVC double lumen, PIV  Cardiac Monitoring: None  Code Status: Full Code      Disposition Plan      Expected Discharge Date: 04/09/2023      Destination: home with family          The patient's care was discussed with the Attending Physician, Dr. Samuels.    Joann Sanon MD  Medicine Service, [facility] TEAM [address] [place] [address]  Securely message with [employer] (more info)  Text page via [facility] Paging/Directory   See signed in provider for up to date coverage information  ______________________________________________________________________    Interval History   Care team notes reviewed. Patient had 4 loose bowel movements overnight & 8 yesterday. Mental status significantly improved this morning. Per wife, appears to be at baseline mental status. Denies abdominal pain, fevers, cough, urinary symptoms. Anxious about discharging and coming back in a couple days with more encephalopathy.    Physical Exam   Vital Signs: Temp: 98.3  F (36.8  C) Temp src: Oral BP: 131/81 Pulse: 76    Resp: 15 SpO2: 95 % O2 Device: None (Room air)    Weight: 218 lbs 0 oz    Physical Exam  Vitals reviewed.   Constitutional:       General: He is not in acute distress.     Appearance: Normal appearance.   HENT:      Head: Normocephalic and atraumatic.   Eyes:      Extraocular Movements: Extraocular movements intact.      Conjunctiva/sclera: Conjunctivae normal.   Cardiovascular:      Rate and Rhythm: Normal rate and regular rhythm.   Pulmonary:      Effort: Pulmonary effort is normal.      Breath sounds: Normal breath sounds.   Abdominal:      General: There is distension.      Palpations: Abdomen is soft.      Tenderness: There is no abdominal tenderness.      Hernia: A hernia is present.   Musculoskeletal:      Right lower leg: No edema.      Left lower leg: No edema.   Skin:     General: Skin is warm and dry.   Neurological:      General: No focal deficit present.      Mental Status: He is alert and oriented to person, place, and time.   Psychiatric:         Mood and Affect: Mood normal.         Behavior: Behavior normal.       Medical Decision Making     Please see A&P for additional details of medical decision making.      Data     I have personally reviewed the following data over the past 24 hrs:    10.2  \   10.9 (L)   / 133 (L)     139 106 50.6 (H) /  75   3.5 21 (L) 4.76 (H) \       Procal: N/A CRP: N/A Lactic Acid: 1.3       INR:  1.89 (H) PTT:  N/A   D-dimer:  N/A Fibrinogen:  N/A       Imaging results reviewed over the past 24 hrs:   No results found for this or any previous visit (from the past 24 hour(s)).

## 2023-04-09 NOTE — PLAN OF CARE
Goal Outcome Evaluation:      Plan of Care Reviewed With: patient    Overall Patient Progress: no changeOverall Patient Progress: no change    Outcome Evaluation: VSS on room air. A/O x4. [place] score 0. Pt reports 1 bm overnight. Ind in room. Stool sample collected; continue enteric precautions. PIV SL. Renal diet. Denies pain. Slight fall in hemoglobin to 11.6. Calls appropriately.

## 2023-04-09 NOTE — PLAN OF CARE
"Goal Outcome Evaluation:      Plan of Care Reviewed With: patient    Overall Patient Progress: no changeOverall Patient Progress: no change    Outcome Evaluation: Pt A&Ox4, VSS on RA.  Scoring 0 on WH. When walking with wife this afternoon had episode of \"blacking out\", vital signs checked and positive for orthostatic blood pressures, educated to call when needing to get up.  Lactulose given this AM, 4 bm today. Good appetite, drinking fluids.      "

## 2023-04-09 NOTE — PLAN OF CARE
Physical Therapy Discharge Summary    Reason for therapy discharge:    Discharged to home with outpatient therapy.    Progress towards therapy goal(s). See goals on Care Plan in [facility] electronic health record for goal details.  Goals met    Therapy recommendation(s):    Continued therapy is recommended.  Rationale/Recommendations:  OP PT recommended for higher level balance and strengthening.

## 2023-04-09 NOTE — PROGRESS NOTES
04/09/23 1400   Appointment Info   Signing Clinician's Name / Credentials (PT) Ade Green, PT, DPT   Living Environment   People in Home spouse   Current Living Arrangements house   Home Accessibility stairs within home   Number of Stairs, Within Home, Primary eight   Stair Railings, Within Home, Primary railing on right side (ascending)   Transportation Anticipated family or friend will provide   Living Environment Comments Pt lives with spouse in a house; 8 steps with R rail ascending  to bedroom upstairs   Self-Care   Usual Activity Tolerance good   Current Activity Tolerance fair   Regular Exercise No   Equipment Currently Used at Home none   Fall history within last six months no   Activity/Exercise/Self-Care Comment IND at baseline   General Information   Onset of Illness/Injury or Date of Surgery 04/07/23   Referring Physician Fany Samuels MD   Patient/Family Therapy Goals Statement (PT) to go back home   Pertinent History of Current Problem (include personal factors and/or comorbidities that impact the POC) per EMR:Damion Quinones is a 67 year old male admitted on 4/7/2023. He has a history of paroxysmal atrial fibrillation (on Apixiban), ESRD on HD, ESLD s/p TIPS (10/22), recurrent c diff, hereditary hemochromatosis who is presented to the ED with confusion and is admitted for altered mental status most likely secondary to hepatic encephalopathy whose mental status has improved throughout hospital stay. C. Diff positive 4/9.   Cognition   Affect/Mental Status (Cognition) WNL   Orientation Status (Cognition) oriented x 4   Follows Commands (Cognition) WNL   Pain Assessment   Patient Currently in Pain No   Integumentary/Edema   Integumentary/Edema no deficits were identifed   Posture    Posture Protracted shoulders   Range of Motion (ROM)   Range of Motion ROM is WFL   Strength (Manual Muscle Testing)   Strength (Manual Muscle Testing) strength is WFL   Bed Mobility   Bed Mobility no deficits identified    Transfers   Transfers sit-stand transfer   Gait/Stairs (Locomotion)   [place] Level (Gait) independent   Distance in Feet 500'   Balance   Balance other (describe)   Balance Comments sway with head turns during gait. Also unstable when walking backwards.   Sensory Examination   Sensory Perception patient reports no sensory changes   Coordination   Coordination no deficits were identified   Muscle Tone   Muscle Tone no deficits were identified   Clinical Impression   Criteria for Skilled Therapeutic Intervention Yes, treatment indicated   PT Diagnosis (PT) impaired functional mobility   Influenced by the following impairments balance, activity tolerance, weakness   Functional limitations due to impairments gait,stairs   Clinical Presentation (PT Evaluation Complexity) Stable/Uncomplicated   Clinical Presentation Rationale clinician judgement   Clinical Decision Making (Complexity) low complexity   Planned Therapy Interventions (PT) balance training;gait training;home exercise program;neuromuscular re-education;patient/family education;stair training;strengthening;transfer training;progressive activity/exercise   Anticipated Equipment Needs at Discharge (PT) other (see comments)  (none)   Risk & Benefits of therapy have been explained care plan/treatment goals reviewed;evaluation/treatment results reviewed;risks/benefits reviewed;participants voiced agreement with care plan;current/potential barriers reviewed;patient;participants included   PT Total Evaluation Time   PT Eval, Low Complexity Minutes (83558) 8   Physical Therapy Goals   PT Frequency One time eval and treatment only   PT Predicted Duration/Target Date for Goal Attainment 04/10/23   PT Goals Gait;Stairs   PT: Gait Independent;Greater than 200 feet;Goal Met   PT: Stairs Independent;10 stairs;Goal Met   Interventions   Interventions Quick Adds Gait Training;Therapeutic Activity;Therapeutic Procedure   PT Discharge Planning   PT Discharge  Recommendation (DC Rec) home with outpatient physical therapy   PT Rationale for DC Rec Pt up IND with some supervision; he is close to baseline and from a mobility perspective will benefit from higher level OP PT care to promote balance, strength and activity tolerance. Safe to d/c home with  OP PT when medically cleared.   PT Brief overview of current status up IND   Total Session Time   Total Session Time (sum of timed and untimed services) 8

## 2023-04-09 NOTE — CONSULTS
Care Management Initial Consult    General Information  Assessment completed with: Spouse or significant other, VM-chart review,  (Patient's wife Apoorva)  Type of CM/SW Visit: Initial Assessment    Primary Care Provider verified and updated as needed: Yes   Readmission within the last 30 days: no previous admission in last 30 days (Patient presented to the ER earler in the week for a similar reason, but was not admitted)      Reason for Consult: discharge planning  Advance Care Planning: Advance Care Planning Reviewed: verified with patient, no concerns identified          Communication Assessment  Patient's communication style: spoken language (English or Bilingual)    Hearing Difficulty or Deaf: no   Wear Glasses or Blind: no    Cognitive  Cognitive/Neuro/Behavioral: WDL  Level of Consciousness: alert  Arousal Level: opens eyes spontaneously  Orientation: oriented x 4  Mood/Behavior: calm, cooperative     Speech: clear, logical    Living Environment:   People in home: spouse     Current living Arrangements: house      Able to return to prior arrangements: yes       Family/Social Support:  Care provided by: spouse/significant other  Provides care for: no one  Marital Status:   Wife, Other (specify) (Other family members)   (Apoorva)       Description of Support System: Supportive, Involved    Support Assessment: Adequate family and caregiver support    Current Resources:   Patient receiving home care services: No     Community Resources: None  Equipment currently used at home: none  Supplies currently used at home: None    Employment/Financial:  Employment Status: retired        Financial Concerns: No concerns identified   Referral to Financial Worker: No       Lifestyle & Psychosocial Needs:  Social Determinants of Health     Tobacco Use: Low Risk  (3/21/2023)    Patient History      Smoking Tobacco Use: Never      Smokeless Tobacco Use: Never      Passive Exposure: Not on file   Alcohol Use: Not on file    Financial Resource Strain: Not on file   Food Insecurity: Not on file   Transportation Needs: Not on file   Physical Activity: Not on file   Stress: Not on file   Social Connections: Not on file   Intimate Partner Violence: Not on file   Depression: Not at risk (3/15/2023)    PHQ-2      PHQ-2 Score: 0   Housing Stability: Not on file       Functional Status:  Prior to admission patient needed assistance:   Dependent ADLs:: Independent  Dependent IADLs:: Independent       Mental Health Status:  Mental Health Status: No Current Concerns       Chemical Dependency Status:  Chemical Dependency Status: No Current Concerns             Values/Beliefs:  Spiritual, Cultural Beliefs, Scientologist Practices, Values that affect care: yes               Additional Information:  Damion Quinones is a 67 year old male admitted on 4/7/2023. He has a history of paroxysmal atrial fibrillation (on Apixiban), ESRD on HD, ESLD s/p TIPS (10/22), recurrent c diff, hereditary hemochromatosis who is presented to the ED with confusion and is admitted for altered mental status most likely secondary to hepatic encephalopathy whose mental status has improved throughout hospital stay.     SW met with patient's wife at bedside to complete CMA due to the patient not being in his room at this time. The patient's wife Apoorva reported that the patient's lives in a house with his wife Apoorva in [facility]. The patient is independent with his activities of daily living and does not use any equipment at home. The patient is on dialysis through [facility] at the [facility] location. He has dialysis on M, W, and F. His chair time is at 0545. The patient's wife Apoorva declined having any questions or concerns at this time     [facility]  [address] [facility] [id number]  P: [phone]  M, W, F @ 0545    Parth OLIVERA   4/9/2023       Social Work and Care Management Department       SEARCHABLE in [employer] - search SOCIAL WORK        [facility] (0800 - 1630) Saturday and Sunday     Units: 4A, 4C, & 4E Pager: [phone]     Units: 5A & 5B Pager: [phone]     Units: 6A & 6B   Pager: [phone]     Units: 6C & 6D Pager: [phone]     Units: 7A &7B  Pager: [phone]     Units: 7C, 7D, & 5C Pager: [phone]     Unit: [facility] Pager: [phone]      [facility] (1248-2237) Saturday and Sunday      Units: 5 Ortho, 5 Med/Surg & WB ED  Pager:[phone]     Units: 6 Med/Surg, 8A, & 10A ICU  Pager: [phone]        After hours (1630 - 0000) Saturday & Sunday; ([id number]) Mon-Fri; ([id number]) [facility] Recognized Holidays     Units: ALL  Pager: [phone]

## 2023-04-09 NOTE — CONSULTS
GENERAL INFECTIOUS DISEASES CONSULTATION     Patient:  Damion Quinones   Date of birth 1955, Medical record number [id number]  Date of Visit:  04/09/2023  Date of Admission: 4/7/2023  Consult Requester:Fany Samuels MD          Assessment and Recommendations:   ASSESSMENT:  1. C.diff colonization (toxin B PCR +, GDH Ag +, toxin Ag -)  2. Hx recurrent C.diff; on PO vancomycin taper x6 weeks through 4/11  3. Hepatic encephalopathy  4. ESLD c/b variceal bleed s/p TIPS (10/2022)  5. ESRD on HD    DISCUSSION:   Damion Quinones is a 67 year old male with history of ESRD on HD, ESLD c/b variceal bleed s/p TIPS (10/2022) and hepatic encephalopathy, recurrent C.diff infections, hereditary hemochromatosis who was admitted on 4/7/23 for altered mental status. Recently seen in ED 4/3 for altered mental status, resolved with lactulose and discharged 4/4.    Recurrent C.diff infections: 12/2022 CDI episode was treated with 10 days of QID Po vanco; the second (1/2023) with 10d BID fidaxomicin, and the third (2/2023) with 10d fidaxomicin followed by 6 week PO vancomycin taper, which is set to end on 4/11/23.    Afebrile, WBC 14.1-->10.2 without antimicrobials. Most recently, patient has been having 3-4 formed stools daily on PO vancomycin taper. Patient and his wife note increased stomach rumbling and belching. No fevers, nausea, abdominal pain prior to admission; no other localizing symptoms. US without free fluid, SBP unlikely. Blood cultures NGTD. Has not been taking scheduled lactulose as he has been having regular formed bowel moements. Altered mental status was responsive to lactulose during previous ED visit and has improved with lactulose this admission. Lactulose was resumed in ED on 4/7, C.diff test was checked 4/8 after lactulose initiated. C.diff toxin B PCR (detects the gene for toxin B) positive, GDH Ag positive, C.diff toxin negative. This pattern indicates colonization rather than active C.diff infection. Would  continue current taper.       RECOMMENDATION:  1. C.diff test consistent with colonization, without active infection  2. Continue current plan for PO vancomycin taper 125mg PO every other day through 4/11      Thank you for this consult. ID will continue to follow.     Patient was discussed with Dr. Carrillo.     Rut Henry PA-C  Infectious Disease  Pager # [phone]    ________________________________________________________________    Consult Question: Repeat recurrent c diff colitis, has been treated fidaxomicin and vancomycin in the past. Also complicated by recurrent hepatic encephalopathy.  Admission Diagnosis: Encephalopathy [G93.40]         History of Present Illness:     Damion Quinones is a 67 year old male with history of ESRD on HD, ESLD c/b variceal bleed s/p TIPS (10/2022) and hepatic encephalopathy, recurrent C.diff infections, hereditary hemochromatosis who was admitted on 4/7/23 for altered mental status.    Brief history of C.diff infections: 12/2022 CDI episode was treated with 10 days of QID Po vanco; the second (1/2023) with 10d BID fidaxomicin, and the third (2/2023) with 10d fidaxomicin. Antibiotic exposures prior to these events, primarily for presumed SSTI. Then developed watery stools more frequent than baseline (3-5x/day is baseline) with abdominal discomfort. Following most recent treatment, he was started on PO vancomycin with plan for 6-week taper (BID x2 weeks, qday x2 weeks, q other day x2 weeks), which is set to end 4/11.     Since C.diff infections, has not consistently been taking lactulose. Recently seen in [place], [place] ED (4/3/23) after dialysis due to acute confusion and unsteady gait. Ammonia returned at 96, was given lactulose in ED and sx improved, discharged on 4/4. Went to HD on 4/5 and remained at baseline. Confusion returned on 4/7, prompting current admission. Somewhat improved after lactulose x1 in ED.     Most recently patient has been having 3-4 formed stools per day. He and  his wife note that his stomach has been rumbling loudly and he has been belching intermittently. No abdominal pain, nausea, fevers, diarrhea, cough, new pain. Since resuming lactulose in the hospital, he has had more flatulence, bloating and loose to liquid stools.            Past Medical History:     Past Medical History:   Diagnosis Date     Alcoholic cirrhosis of liver with ascites (H) 10/11/2019     Arthritis     RA     C. difficile colitis      Chronic kidney disease      Coronary artery disease     AFib 2016     Diabetes mellitus type 2, uncomplicated (H) 10/11/2019     History of blood transfusion     2016     History of hemochromatosis 10/11/2019     Hypertension      Hypokalemia      Obesity      PAF (paroxysmal atrial fibrillation) (H)      Psoriatic arthritis (H)             Past Surgical History:     Past Surgical History:   Procedure Laterality Date     APPENDECTOMY      Removed at 18 Years Old      COLONOSCOPY      2014 at [facility].      EYE SURGERY Bilateral     Cataract     HERNIA REPAIR      History of bilateral inguinal hernia repair: 10/28/2014. Open hernia repair: 10/2017. Abdominal wound exploration and debridement 12/27/2017     INSERT SHUNT PORTAL TRANSJUGULAR INTRAHEPTIC  09/26/2022     IR CVC TUNNEL PLACEMENT > 5 YRS OF AGE  01/26/2023            Family History:   Reviewed and non-contributory.   Family History   Problem Relation Age of Onset     Lung Cancer Mother      Colon Cancer Father      Lung Cancer Brother      Heart Failure Brother      Kidney Disease Brother             Social History:     Social History     Tobacco Use     Smoking status: Never     Smokeless tobacco: Never   Vaping Use     Vaping status: Never Used   Substance Use Topics     Alcohol use: Not Currently     Comment: Last ETOH use was 12/31/2021     History   Sexual Activity     Sexual activity: Not on file            Current Medications:       apixaban ANTICOAGULANT  5 mg Oral BID     bumetanide  2 mg Oral BID      calcium carbonate-vitamin D  1 tablet Oral BID     folic acid  5 mg Oral Daily     [START ON 4/10/2023] lactulose  30 g Oral Daily     metoprolol succinate ER  25 mg Oral Q24H     pantoprazole  40 mg Oral BID     predniSONE  10 mg Oral Daily     rifaximin  550 mg Oral BID     sodium chloride (PF)  3 mL Intracatheter Q8H     spironolactone  25 mg Oral Daily     vancomycin  125 mg Oral 4x Daily     zinc sulfate  220 mg Oral Daily            Allergies:   No Known Allergies         Physical Exam:   Vitals were reviewed  Patient Vitals for the past 24 hrs:   BP Temp Temp src Pulse Resp SpO2   04/09/23 0617 131/81 98.3  F (36.8  C) Oral 76 15 95 %   04/08/23 2232 110/71 98.2  F (36.8  C) Oral 69 16 98 %   04/08/23 1528 114/67 -- -- 80 -- 98 %   04/08/23 1402 106/75 98.3  F (36.8  C) Oral 98 -- 98 %       Physical Examination:  GENERAL:  Awake, alert, interactive. Supine in bed in NAD.   HEENT:  Head is normocephalic, atraumatic   EYES:  Eyes have anicteric sclerae without conjunctival injection.    ENT:  Oropharynx is moist without exudates or ulcers.   LUNGS:  Clear to anterior auscultation bilateral. No wheeze, rhonchi, or crackles.  CARDIOVASCULAR:  RRR, +S1/S2.  ABDOMEN:  Soft, mildly distended, +hyperactive bowel sounds  SKIN:  No acute rashes.  L chest HD line in place without any surrounding erythema or exudate. No stigmata of endocarditis.  NEUROLOGIC:  Grossly nonfocal. Active x4 extremities         Laboratory Data:     Inflammatory Markers    Recent Labs   Lab Test 01/15/23  1107 12/16/22  1652   SED 11  --    CRPI 19.20* 77.70*       Hematology Studies    Recent Labs   Lab Test 04/09/23  0658 04/09/23  0141 04/08/23  1729 04/08/23  0617 04/07/23  1150 03/03/23  1401 02/13/23  0708 02/09/23  0930   WBC 10.2  --   --  14.1* 13.0* 11.5* 10.5 9.0   HGB 10.9* 11.6* 11.9* 13.0* 13.4 10.7* 12.3* 11.0*   MCV 94  --   --  94 95 92 91 92   *  --   --  180 164 193 174 153       Metabolic Studies     Recent Labs    Lab Test 04/09/23  0658 04/08/23  0617 04/07/23  1150 02/13/23  0708 02/09/23  0930    139 141 136 135*   POTASSIUM 3.5 4.0 3.9 4.1 4.1   CHLORIDE 106 106 104 99 101   CO2 21* 20* 20* 17* 23   BUN 50.6* 32.2* 22.1 63.1* 42.4*   CR 4.76* 3.62* 2.51* 4.87* 4.02*   GFRESTIMATED 13* 18* 28* 12* 16*       Hepatic Studies    Recent Labs   Lab Test 04/07/23  1150 02/13/23  0708 02/09/23  0930 01/28/23  0636 01/27/23  0604 01/26/23  0538 01/25/23  0631   BILITOTAL 1.0 1.0 0.6 0.7 0.7 0.9 0.9   ALKPHOS 170* 235* 228* 193* 203* 210* 210*   ALBUMIN 3.3* 2.9* 2.7* 2.7* 2.8* 3.0* 2.9*   AST 63*  --  43 43 56* 53* 44   ALT 46 40 35 28 44 49 47       Microbiology:  Culture   Date Value Ref Range Status   04/07/2023 No growth after 1 day  Preliminary   04/07/2023 No growth after 1 day  Preliminary   02/13/2023 No Growth  Final   02/13/2023 No Growth  Final   01/23/2023 No Growth  Final   01/23/2023 No Growth  Final   12/16/2022 No Growth  Final   12/16/2022 No Growth  Final       Urine Studies    Recent Labs   Lab Test 04/07/23  1246 02/13/23  0743 01/23/23  0828 01/15/23  1226 12/16/22  1917   LEUKEST Negative Negative Negative Negative Negative   WBCU 2 2 8* 3 <1       Vancomycin Levels  No lab results found.    Invalid input(s): VANCO    Hepatitis B Testing   Recent Labs   Lab Test 01/26/23  0538 11/22/22  0848   HBCAB  --  Nonreactive   HEPBANG Nonreactive Nonreactive     Hepatitis C Testing     Hepatitis C Antibody   Date Value Ref Range Status   11/22/2022 Nonreactive Nonreactive Final     Respiratory Virus Testing    No results found for: RS, FLUAG          Imaging:     US abdomen complete (4/9/2023)  Impression:   1. Limited evaluation due to patient body habitus.     2. Patent TIPS. Since the prior study in January there is now  antegrade flow in both right and left portal veins a decrease in  velocity of the main portal vein. Findings suggest early shunt  malfunction. Velocities do not suggest a focal stenosis,  however.    CT head (4/7/23)  IMPRESSION:   1. No findings to explain the patient's encephalopathy.  2. Findings of chronic small vessel ischemic disease.  3. Unchanged right anterior inferior frontal lobe gyrus rectus and  orbital gyrus encephalomalacia.    CXR (4/7/23)  Impression:   No acute airspace process.

## 2023-04-10 VITALS
OXYGEN SATURATION: 100 % | DIASTOLIC BLOOD PRESSURE: 70 MMHG | HEART RATE: 88 BPM | BODY MASS INDEX: 33.19 KG/M2 | SYSTOLIC BLOOD PRESSURE: 125 MMHG | WEIGHT: 218.2 LBS | RESPIRATION RATE: 16 BRPM | TEMPERATURE: 98.7 F

## 2023-04-10 LAB
ALBUMIN SERPL BCG-MCNC: 2.6 G/DL (ref 3.5–5.2)
ALP SERPL-CCNC: 139 U/L (ref 40–129)
ALT SERPL W P-5'-P-CCNC: 35 U/L (ref 10–50)
ANION GAP SERPL CALCULATED.3IONS-SCNC: 15 MMOL/L (ref 7–15)
AST SERPL W P-5'-P-CCNC: 42 U/L (ref 10–50)
BASOPHILS # BLD AUTO: 0.1 10E3/UL (ref 0–0.2)
BASOPHILS NFR BLD AUTO: 1 %
BILIRUB DIRECT SERPL-MCNC: 0.33 MG/DL (ref 0–0.3)
BILIRUB SERPL-MCNC: 0.9 MG/DL
BUN SERPL-MCNC: 60.3 MG/DL (ref 8–23)
CALCIUM SERPL-MCNC: 8.8 MG/DL (ref 8.8–10.2)
CHLORIDE SERPL-SCNC: 103 MMOL/L (ref 98–107)
CREAT SERPL-MCNC: 5.19 MG/DL (ref 0.67–1.17)
DEPRECATED HCO3 PLAS-SCNC: 19 MMOL/L (ref 22–29)
EOSINOPHIL # BLD AUTO: 0.1 10E3/UL (ref 0–0.7)
EOSINOPHIL NFR BLD AUTO: 1 %
ERYTHROCYTE [DISTWIDTH] IN BLOOD BY AUTOMATED COUNT: 17.4 % (ref 10–15)
GFR SERPL CREATININE-BSD FRML MDRD: 12 ML/MIN/1.73M2
GLUCOSE SERPL-MCNC: 79 MG/DL (ref 70–99)
HCT VFR BLD AUTO: 34.8 % (ref 40–53)
HGB BLD-MCNC: 11.3 G/DL (ref 13.3–17.7)
IMM GRANULOCYTES # BLD: 0.2 10E3/UL
IMM GRANULOCYTES NFR BLD: 2 %
INR PPP: 1.82 (ref 0.85–1.15)
LACTATE SERPL-SCNC: 1.3 MMOL/L (ref 0.7–2)
LYMPHOCYTES # BLD AUTO: 1 10E3/UL (ref 0.8–5.3)
LYMPHOCYTES NFR BLD AUTO: 11 %
MCH RBC QN AUTO: 30.5 PG (ref 26.5–33)
MCHC RBC AUTO-ENTMCNC: 32.5 G/DL (ref 31.5–36.5)
MCV RBC AUTO: 94 FL (ref 78–100)
MONOCYTES # BLD AUTO: 1 10E3/UL (ref 0–1.3)
MONOCYTES NFR BLD AUTO: 10 %
NEUTROPHILS # BLD AUTO: 7.1 10E3/UL (ref 1.6–8.3)
NEUTROPHILS NFR BLD AUTO: 75 %
NRBC # BLD AUTO: 0 10E3/UL
NRBC BLD AUTO-RTO: 0 /100
PLATELET # BLD AUTO: 130 10E3/UL (ref 150–450)
POTASSIUM SERPL-SCNC: 3.2 MMOL/L (ref 3.4–5.3)
PROT SERPL-MCNC: 4.5 G/DL (ref 6.4–8.3)
RBC # BLD AUTO: 3.71 10E6/UL (ref 4.4–5.9)
SODIUM SERPL-SCNC: 137 MMOL/L (ref 136–145)
WBC # BLD AUTO: 9.5 10E3/UL (ref 4–11)

## 2023-04-10 PROCEDURE — 99222 1ST HOSP IP/OBS MODERATE 55: CPT | Performed by: PHYSICIAN ASSISTANT

## 2023-04-10 PROCEDURE — 85025 COMPLETE CBC W/AUTO DIFF WBC: CPT

## 2023-04-10 PROCEDURE — 36415 COLL VENOUS BLD VENIPUNCTURE: CPT

## 2023-04-10 PROCEDURE — 82310 ASSAY OF CALCIUM: CPT

## 2023-04-10 PROCEDURE — 250N000013 HC RX MED GY IP 250 OP 250 PS 637

## 2023-04-10 PROCEDURE — 250N000013 HC RX MED GY IP 250 OP 250 PS 637: Performed by: INTERNAL MEDICINE

## 2023-04-10 PROCEDURE — 85610 PROTHROMBIN TIME: CPT

## 2023-04-10 PROCEDURE — 90937 HEMODIALYSIS REPEATED EVAL: CPT

## 2023-04-10 PROCEDURE — 83605 ASSAY OF LACTIC ACID: CPT

## 2023-04-10 PROCEDURE — 5A1D70Z PERFORMANCE OF URINARY FILTRATION, INTERMITTENT, LESS THAN 6 HOURS PER DAY: ICD-10-PCS | Performed by: PHYSICIAN ASSISTANT

## 2023-04-10 PROCEDURE — 258N000003 HC RX IP 258 OP 636: Performed by: INTERNAL MEDICINE

## 2023-04-10 PROCEDURE — 99207 PR NO CHARGE SIGN-OFF PS: CPT | Performed by: INTERNAL MEDICINE

## 2023-04-10 PROCEDURE — 250N000012 HC RX MED GY IP 250 OP 636 PS 637

## 2023-04-10 PROCEDURE — 99238 HOSP IP/OBS DSCHRG MGMT 30/<: CPT | Mod: GC | Performed by: INTERNAL MEDICINE

## 2023-04-10 RX ORDER — VANCOMYCIN HYDROCHLORIDE 125 MG/1
125 CAPSULE ORAL
Status: DISCONTINUED | OUTPATIENT
Start: 2023-04-11 | End: 2023-04-10 | Stop reason: HOSPADM

## 2023-04-10 RX ORDER — LACTULOSE 10 G/10G
30 SOLUTION ORAL DAILY
Qty: 30 PACKET | Refills: 0 | Status: SHIPPED | OUTPATIENT
Start: 2023-04-11 | End: 2023-05-18

## 2023-04-10 RX ADMIN — APIXABAN 5 MG: 5 TABLET, FILM COATED ORAL at 09:21

## 2023-04-10 RX ADMIN — ZINC SULFATE 220 MG (50 MG) CAPSULE 220 MG: CAPSULE at 09:21

## 2023-04-10 RX ADMIN — METOPROLOL SUCCINATE 25 MG: 25 TABLET, EXTENDED RELEASE ORAL at 16:50

## 2023-04-10 RX ADMIN — Medication: at 12:01

## 2023-04-10 RX ADMIN — BUMETANIDE 2 MG: 2 TABLET ORAL at 09:20

## 2023-04-10 RX ADMIN — PANTOPRAZOLE SODIUM 40 MG: 40 TABLET, DELAYED RELEASE ORAL at 09:20

## 2023-04-10 RX ADMIN — SODIUM CHLORIDE 250 ML: 9 INJECTION, SOLUTION INTRAVENOUS at 12:00

## 2023-04-10 RX ADMIN — SODIUM CHLORIDE 300 ML: 9 INJECTION, SOLUTION INTRAVENOUS at 12:00

## 2023-04-10 RX ADMIN — SPIRONOLACTONE 25 MG: 25 TABLET, FILM COATED ORAL at 09:20

## 2023-04-10 RX ADMIN — RIFAXIMIN 550 MG: 550 TABLET ORAL at 09:21

## 2023-04-10 RX ADMIN — PREDNISONE 10 MG: 10 TABLET ORAL at 09:20

## 2023-04-10 RX ADMIN — LACTULOSE 30 G: 10 POWDER, FOR SOLUTION ORAL at 09:21

## 2023-04-10 RX ADMIN — Medication 1 TABLET: at 09:20

## 2023-04-10 ASSESSMENT — ACTIVITIES OF DAILY LIVING (ADL)
ADLS_ACUITY_SCORE: 19

## 2023-04-10 NOTE — DISCHARGE SUMMARY
[facility]  Discharge Summary - Medicine & Pediatrics       Date of Admission:  4/7/2023  Date of Discharge:  4/10/2023  Discharging Provider: Fany Samuels MD  Discharge Service: Medicine Service, [facility] TEAM 3    Discharge Diagnoses   Altered mental status 2/2 hepatic encephalopathy   Decompensated cirrhosis 2/2 alcohol use and hereditary hemochromatosis s/p TIPS 10/22 decompensated by recurrent hepatic encephalopathy   Leukocytosis  C. difficile colitis  PAF  Hx DVT  Hx Gout  GERD    Follow-ups Needed After Discharge   Follow-up with GI/ Hepatology and Infectious Disease as previously scheduled.  Follow-up with PCP in 3-5 days for post-hospital stay evaluation  Follow-up with [facility] for further recommendations on TIPS ultrasound     Discharge Disposition   Discharged to home  Condition at discharge: Stable    Hospital Course   Damion Quinones is a 67 year old male admitted on 4/7/2023. He has a history of paroxysmal atrial fibrillation (on Apixiban), ESRD on HD, ESLD s/p TIPS (10/22), recurrent c diff, hereditary hemochromatosis who is presented to the ED with confusion and is admitted for altered mental status most likely secondary to hepatic encephalopathy whose mental status has improved throughout hospital stay.     #Altered mental status 2/2 hepatic encephalopathy   #Decompensated cirrhosis 2/2 alcohol use and hereditary hemochromatosis s/p TIPS 10/22 decompensated by recurrent hepatic encephalopathy  Patient with multiple recent ED visits and hospitalizations for hepatic encephalopathy. Patient noted  since having C diff colitis and starting dialysis he had been having frequent loose bowel movements > 3 daily thus not using lactulose consistently. CT head obtained and negative for acute intracranial process. Current episode of altered mental status likely recurrent hepatic encephalopathy as mental status improved following dialysis and  dose of lactulose. Now back to baseline. Hepatology consulted given frequency of episodes of HE and recommended once daily lactulose for consistent > 3 bowel movements daily and to aid in ammonia clearance.   - Lactulose 30mg daily   - Continue Spironolactone 25 mg every day  - Continue Rifaximin 550mg BID  - Continue zinc sulfate 220mg every day  - Continue calcium, folate supplementation  - Follow-up with Hepatology as previously scheduled  - Follow-up with [facility] for further TIPS management      # C. difficile colitis  Patient on prolonged PO vanc taper expected to end 4/11 for recurrent C diff colitis. New leukocytosis on labs this admission. CXR not concerning for acute process, UA not concerning for infection. No new rashes, denies abdominal pain, no significant ascites on exam. Repeat C diff still positive for toxin however ID consulted and believes to be due to colonization and less concern for reactivitation. OK to discontinue PO vancomycin on 4/11 as previously scheduled.  - Continue PO Vancomycin 125mg every other day until 4/11  - Follow up with ID as previously scheduled    #ESRD- Continue Bumex 2mg BID. HD M/W/F.  #PAF-Continue Metoprolol 25mg every day  #Hx DVT- Continue Apixaban 5mg BID  #Hx Gout- Continue Prednisone 10mg every day  #GERD -Continue Omeprazole 40mg BID    Consultations This Hospital Stay   GI HEPATOLOGY ADULT IP CONSULT  PHYSICAL THERAPY ADULT IP CONSULT  INFECTIOUS DISEASE GENERAL ADULT IP CONSULT  NEPHROLOGY ESRD ADULT IP CONSULT  CARE MANAGEMENT / SOCIAL WORK IP CONSULT  NEPHROLOGY ESRD ADULT IP CONSULT    Code Status   Full Code       The patient was discussed with Dr. Samuels.     Vi Esteban MD  [facility]  [facility] [address] [address]  Phone: [phone]  ______________________________________________________________________    Physical Exam   Vital Signs: Temp: 98.2  F (36.8  C) Temp src: Oral BP:  126/79 Pulse: 94   Resp: 16 SpO2: 99 % O2 Device: None (Room air)    Weight: 218 lbs 3.2 oz    Physical Exam  Constitutional:       General: He is not in acute distress.  HENT:      Head: Normocephalic and atraumatic.   Eyes:      Extraocular Movements: Extraocular movements intact.      Conjunctiva/sclera: Conjunctivae normal.   Cardiovascular:      Rate and Rhythm: Normal rate and regular rhythm.      Pulses: Normal pulses.   Pulmonary:      Effort: Pulmonary effort is normal.      Breath sounds: Normal breath sounds.   Abdominal:      General: There is distension.      Palpations: Abdomen is soft.      Tenderness: There is no abdominal tenderness.   Musculoskeletal:      Comments: Edema and bruising in bilateral upper extremities   Skin:     General: Skin is warm and dry.   Neurological:      General: No focal deficit present.      Mental Status: He is alert and oriented to person, place, and time.      Comments: Negative asterixis    Psychiatric:         Mood and Affect: Mood normal.         Behavior: Behavior normal.       Primary Care Physician   Gary Serrano    Discharge Orders   No discharge procedures on file.    Significant Results and Procedures   Most Recent 3 CBC's:  Recent Labs   Lab Test 04/10/23  0645 04/09/23  0658 04/09/23  0141 04/08/23  1729 04/08/23  0617   WBC 9.5 10.2  --   --  14.1*   HGB 11.3* 10.9* 11.6*   < > 13.0*   MCV 94 94  --   --  94   * 133*  --   --  180    < > = values in this interval not displayed.     Most Recent 3 BMP's:  Recent Labs   Lab Test 04/10/23  0645 04/09/23  1556 04/09/23  0658 04/08/23  0617     --  139 139   POTASSIUM 3.2*  --  3.5 4.0   CHLORIDE 103  --  106 106   CO2 19*  --  21* 20*   BUN 60.3*  --  50.6* 32.2*   CR 5.19*  --  4.76* 3.62*   ANIONGAP 15  --  12 13   NAZARIO 8.8  --  8.8 9.0   GLC 79 122* 75 81     Most Recent 2 LFT's:  Recent Labs   Lab Test 04/09/23  0658 04/07/23  1150   AST 42 63*   ALT 35 46   ALKPHOS 139* 170*   BILITOTAL 0.9  1.0   ,   Results for orders placed or performed during the hospital encounter of 04/07/23   CT Head w/o Contrast    Narrative    EXAM: CT HEAD W/O CONTRAST  4/7/2023 1:22 PM     HISTORY:  Altered mental status       COMPARISON:  Head CT 1/23/2023.    TECHNIQUE: Using multidetector thin collimation helical acquisition  technique, axial, coronal and sagittal CT images from the skull base  to the vertex were obtained without intravenous contrast.   (topogram) image(s) also obtained and reviewed.    FINDINGS:  No acute intracranial hemorrhage, mass effect, or midline shift. No  acute loss of gray-white matter differentiation in the cerebral  hemispheres. Chronic encephalomalacia in the anterior inferior right  frontal lobe gyrus rectus and orbital gyrus. Ventricles are  proportionate to the cerebral sulci. Clear basal cisterns. Scattered  periventricular hypoattenuating foci, as can be seen in chronic small  vessel ischemic disease.     The bony calvaria and the bones of the skull base are normal. The  visualized portions of the paranasal sinuses and mastoid air cells are  clear. Grossly normal orbits. Bilateral pseudophakia.      Impression    IMPRESSION:   1. No findings to explain the patient's encephalopathy.  2. Findings of chronic small vessel ischemic disease.  3. Unchanged right anterior inferior frontal lobe gyrus rectus and  orbital gyrus encephalomalacia.    I have personally reviewed the examination and initial interpretation  and I agree with the findings.    ALEX SPAULDING MD         SYSTEM ID:  [id number]   XR Chest 2 Views    Narrative    Examination: XR CHEST 2 VIEWS 4/7/2023 1:24 PM    Indication: Altered mental status    Comparison: CT 3/10/2023 and x-ray 2/13/2023    Findings:  PA and lateral views of the chest. Trachea is midline. Cardiac  silhouette is within normal limits. Prominent aortic knob. Left-sided  dual-lumen CVC tip terminates over the high right atrium. No  significant pleural  effusion or appreciable pneumothorax. No focal  consolidation or pleural airspace opacities. Endovascular coiling over  the epigastric region. Degenerative changes in visualized spine and  shoulder joints. No acute osseous abnormality.      Impression    Impression:   No acute airspace process.    I have personally reviewed the examination and initial interpretation  and I agree with the findings.    DELONTE CANTU MD         SYSTEM ID:  [id number]   US Abdomen Complete with TIPSS Doppler    Narrative    EXAMINATION: US ABDOMEN COMPLETE WITH TIPSS DOPPLER, 4/9/2023 12:45 PM      COMPARISON: Doppler ultrasound 1/23/2030.    HISTORY: Assess TIPS and check for ascites.    TECHNIQUE:  Grey-scale, color Doppler and spectral flow analysis.    Findings:     Liver: Limited evaluation due to patient body habitus. Coarsened  hepatic echotexture. No evidence for focal mass. The main portal vein  is patent with antegrade flow.    Gallbladder: The gallbladder is not well-visualized.    Bile Ducts: Both the intra and extrahepatic biliary system are of  normal caliber with the common duct measuring 5 mm in diameter.    Pancreas: Visualized portions of the head and body of the pancreas are  unremarkable. No peripancreatic fluid is visualized.    Kidneys: Both kidneys are of normal echotexture, without mass nor  hydronephrosis.   The craniocaudal dimensions are: right- 8.8 cm,  left- 9.2 cm.    Spleen: The spleen is unremarkable and measures 12.1 cm in sagittal  dimension.    Aorta and IVC: The visualized portions of the aorta and IVC are  unremarkable.    Fluid: No free fluid.    DOPPLER:     There is flow anterograde the liver in the main portal vein:  28 cm/sec in the main portal vein  Anterograde at 4 cm/sec in the right portal vein  Anterograde  at 6 cm/sec in the left portal vein    There is flow anterior grade the liver in the hepatic artery:  35 cm/sec peak systolic flow  10 cm/sec minimum diastolic flow   0.73 resistive  index     The stent velocities are  101 cm/sec at the portal vein  128 cm/sec in mid stent  119 cm/sec in the hepatic vein distal shunt end.     The splenic vein is patent and flow is towards the liver.     The middle hepatic veins are patent with flow towards the IVC. The  right and left hepatic veins are not well visualized.      Impression    Impression:   1. Limited evaluation due to patient body habitus.    2. Patent TIPS. Since the prior study in January there is now  antegrade flow in both right and left portal veins a decrease in  velocity of the main portal vein. Findings suggest early shunt  malfunction. Velocities do not suggest a focal stenosis, however.    FINDINGS SUGGESTIVE OF TIPS MALFUNCTION:  A. Peak shunt velocity < 90 cm/s or > 190 cm/s  B. Change in peak shunt velocity:  i. Decrease > 40 cm/s  ii. Increase > 60 cm/s  C. Distal shunt velocity < 90 cm/s or > or = 220 cm/s  D. Main portal vein velocity < or = 30 cm/s  E. Change in portal venous flow direction from retrograde to  antegrade    I have personally reviewed the examination and initial interpretation  and I agree with the findings.    HEBERT MAEYRS MD         SYSTEM ID:  [id number]       Discharge Medications   Current Discharge Medication List        CONTINUE these medications which have NOT CHANGED    Details   apixaban ANTICOAGULANT (ELIQUIS) 5 MG tablet Take 1 tablet (5 mg) by mouth 2 times daily  Qty: 60 tablet, Refills: 2    Associated Diagnoses: Acute deep vein thrombosis (DVT) of right lower extremity, unspecified vein (H)      bumetanide (BUMEX) 1 MG tablet Take 2 tablets (2 mg) by mouth 2 times daily for 180 days  Qty: 360 tablet, Refills: 1    Associated Diagnoses: Essential hypertension      Calcium Carbonate-Vitamin D 500-3.125 MG-MCG TABS Take 1 tablet by mouth 2 times daily      folic acid (FOLVITE) 1 MG tablet TAKE FIVE TABLETS BY MOUTH EVERY DAY      metoprolol succinate ER (TOPROL XL) 25 MG 24 hr tablet Take 1 tablet by  mouth daily at 2 pm      omeprazole 20 MG tablet Take 40 mg by mouth 2 times daily      oxyCODONE (ROXICODONE) 5 MG tablet Take half to one tablet PO Q 8 hours as needed for pain.  Qty: 15 tablet, Refills: 0    Associated Diagnoses: End-stage liver disease (H); ESRD (end stage renal disease) on dialysis (H); Hereditary hemochromatosis (H); Psoriatic arthritis (H); Alcoholic cirrhosis of liver with ascites (H)      predniSONE (DELTASONE) 5 MG tablet Take 10 mg by mouth daily      spironolactone (ALDACTONE) 50 MG tablet Take 25 mg by mouth daily      vancomycin (VANCOCIN) 125 MG capsule Take 1 tab 2x/day for 2wks, then 1 tab 1x/day for 2 wks, then 1 tab every other day for 2 wks, then stop. Increase to 4x/day in the event of CDI symptoms  Qty: 50 capsule, Refills: 1    Associated Diagnoses: C. difficile diarrhea      XIFAXAN 550 MG TABS tablet Take 550 mg by mouth 2 times daily      zinc sulfate (ZINCATE) 220 (50 Zn) MG capsule Take 1 capsule (220 mg) by mouth daily  Qty: 90 capsule, Refills: 0    Associated Diagnoses: Essential hypertension           Allergies   No Known Allergies

## 2023-04-10 NOTE — PLAN OF CARE
Goal Outcome Evaluation:      Plan of Care Reviewed With: patient    Overall Patient Progress: no change    Outcome Evaluation: Pt A&Ox4, VSS on RA.  Scoring 0 on WH. Denies dizzyness/lightheadness with positional changes this shift. Calling appropriately for help before getting up.  Met bowel goal on days. 1 BM overnight and multiple voids. Tolerating diet with good appetite and good fluid intake. Still having some bleeding from rectum, MD to assess today. Believes it is anal fissures. L hand primipore dressing changed. R arm PIV WDL SL. Will have dialysis today and then possible discharge home today vs tomorrow.

## 2023-04-10 NOTE — PROGRESS NOTES
Brief note:      I discussed the case today with the medicine team and the patient is not having diarrhea. He is overall improving clinically. This clinical information in addition to C diff testing result from 4/8/23 (C diff PCR positive and C diff toxin negative) reaffirms the clinical suspicion described in my ID colleagues' note from 4/9/23 -> patient has C diff colonization and not a new C diff colitis episode at this moment.     I discussed the case with Dr. Cage (patient's primary ID provider) today and Dr. Cage believes it is reasonable to continue the oral vancomycin taper with the previously planned end date on 4/11/23. However if there is any preference from the family to prolong, it can then be prolonged for 2 more weeks (end date on 4/25/23). Patient also has a follow up with Dr. Childs scheduled for 4/19/23.     We will be signing off th this moment. Please call us back if any question or need.     Kat Aguilar  04/10/23  2010

## 2023-04-10 NOTE — CONSULTS
Nephrology Initial Consult  April 10, 2023      Damion Quinones MRN:[id number] YOB: 1955  Date of Admission:4/7/2023  Primary care provider: Gary Serrano  Requesting physician: Fany Samuels MD    ASSESSMENT AND RECOMMENDATIONS:   Damion Quinones is a 67 year old male with PMH of pAfib (on apixiban), DMII, CAD, RA, ESRD on HD, ESLD s/p TIPS (10/2022) with subsequent recurrence of HE, recurrent c diff, hereditary hemochromatosis, admitted with HE.        # ESRD: on HD since 1/27/23. Dialyzes MWF at [facility] (ph [phone]). Access: tunneled RIJ. (1/26/23).  kg. Run time: 3.5 kg. Being evaluated for liver/kidney tx  - HD per MWF schedule    # Hypokalemia:  - dialyzing on K4  - supplement as needed to maintain at 4.0 (hypokalemia worsens HE)    # BP/Volume: on bumex 2 mg bid, Toprol XL 25 mg qday (after HD), spironolactone 25 mg qday  - max UF usually 1.5 kg, becomes hypotensive after that (often 0 kg)  - still with significant UOP    # Anemia of ESKD/ESLD:  - hgb > 10.0 g/dL, no indication of FEDERICO  - would avoid iron given hx of hemochromotosis    # BMD: Ca 8.8; PTA 1 tums bid; not on phos binder  - ordered phos for tomorrow AM    Recommendations were communicated to primary team via this note       BRITTANY Atkins   Division of Renal Disease and Hypertension  [phone]      REASON FOR CONSULT: ESKD/dialysis    HISTORY OF PRESENT ILLNESS:  Damion Quinones is a 67 year old male with PMH of pAfib (on apixiban), DMII, CAD, RA, ESRD on HD, ESLD s/p TIPS (10/2022) with subsequent recurrence of HE, recurrent c diff, hereditary hemochromatosis, admitted with HE. OP HD records reviewed. Pt is seen on dialysis, stable run so far. No n/v, CP, SOB, chills. Very tired from not sleeping much last night. A/o4    PAST MEDICAL HISTORY:  Reviewed with patient on 04/10/2023     Past Medical History:   Diagnosis Date     Alcoholic cirrhosis of liver with ascites (H) 10/11/2019     Arthritis     RA      C. difficile colitis      Chronic kidney disease      Coronary artery disease     AFib 2016     Diabetes mellitus type 2, uncomplicated (H) 10/11/2019     History of blood transfusion     2016     History of hemochromatosis 10/11/2019     Hypertension      Hypokalemia      Obesity      PAF (paroxysmal atrial fibrillation) (H)      Psoriatic arthritis (H)        Past Surgical History:   Procedure Laterality Date     APPENDECTOMY      Removed at 18 Years Old      COLONOSCOPY      2014 at [facility].      EYE SURGERY Bilateral     Cataract     HERNIA REPAIR      History of bilateral inguinal hernia repair: 10/28/2014. Open hernia repair: 10/2017. Abdominal wound exploration and debridement 12/27/2017     INSERT SHUNT PORTAL TRANSJUGULAR INTRAHEPTIC  09/26/2022     IR CVC TUNNEL PLACEMENT > 5 YRS OF AGE  01/26/2023        MEDICATIONS:  PTA Meds  Prior to Admission medications    Medication Sig Last Dose Taking? Auth Provider Long Term End Date   apixaban ANTICOAGULANT (ELIQUIS) 5 MG tablet Take 1 tablet (5 mg) by mouth 2 times daily 4/7/2023 at am Yes Gary Serrano MD     bumetanide (BUMEX) 1 MG tablet Take 2 tablets (2 mg) by mouth 2 times daily for 180 days 4/7/2023 at am Yes Gary Serrano MD Yes 9/25/23   Calcium Carbonate-Vitamin D 500-3.125 MG-MCG TABS Take 1 tablet by mouth 2 times daily 4/6/2023 at am Yes Reported, Patient     folic acid (FOLVITE) 1 MG tablet TAKE FIVE TABLETS BY MOUTH EVERY DAY 4/7/2023 at am Yes Reported, Patient     metoprolol succinate ER (TOPROL XL) 25 MG 24 hr tablet Take 1 tablet by mouth daily at 2 pm 4/7/2023 at am Yes Reported, Patient Yes    omeprazole 20 MG tablet Take 40 mg by mouth 2 times daily 4/7/2023 at am Yes Unknown, Entered By History     oxyCODONE (ROXICODONE) 5 MG tablet Take half to one tablet PO Q 8 hours as needed for pain. Unknown at as needed Yes Bhupinder Yancey PA-C     predniSONE (DELTASONE) 5 MG tablet Take 10 mg by mouth daily 4/7/2023 at am Yes  Reported, Patient     spironolactone (ALDACTONE) 50 MG tablet Take 25 mg by mouth daily 4/6/2023 at am Yes Reported, Patient     vancomycin (VANCOCIN) 125 MG capsule Take 1 tab 2x/day for 2wks, then 1 tab 1x/day for 2 wks, then 1 tab every other day for 2 wks, then stop. Increase to 4x/day in the event of CDI symptoms Unknown at unknown Yes Naz Cage MD No    XIFAXAN 550 MG TABS tablet Take 550 mg by mouth 2 times daily 4/7/2023 at am Yes Reported, Patient     zinc sulfate (ZINCATE) 220 (50 Zn) MG capsule Take 1 capsule (220 mg) by mouth daily 4/7/2023 at am Yes Gary Serrano MD        Current Meds    sodium chloride 0.9%  250 mL Intravenous Once in dialysis/CRRT     sodium chloride 0.9%  300 mL Hemodialysis Machine Once     apixaban ANTICOAGULANT  5 mg Oral BID     bumetanide  2 mg Oral BID     calcium carbonate-vitamin D  1 tablet Oral BID     folic acid  5 mg Oral Daily     lactulose  30 g Oral Daily     metoprolol succinate ER  25 mg Oral Q24H     - MEDICATION INSTRUCTIONS -   Does not apply Once     pantoprazole  40 mg Oral BID     predniSONE  10 mg Oral Daily     rifaximin  550 mg Oral BID     sodium chloride (PF)  3 mL Intracatheter Q8H     sodium chloride (PF)  9 mL Intracatheter During Dialysis/CRRT (from stock)     sodium chloride (PF)  9 mL Intracatheter During Dialysis/CRRT (from stock)     spironolactone  25 mg Oral Daily     [START ON 4/11/2023] vancomycin  125 mg Oral Q48H     zinc sulfate  220 mg Oral Daily     Infusion Meds    - MEDICATION INSTRUCTIONS -       - MEDICATION INSTRUCTIONS -         ALLERGIES:    No Known Allergies    REVIEW OF SYSTEMS:  A comprehensive of systems was negative except as noted above.    SOCIAL HISTORY:   Social History     Socioeconomic History     Marital status:      Spouse name: Not on file     Number of children: Not on file     Years of education: Not on file     Highest education level: Not on file   Occupational History     Not on file    Tobacco Use     Smoking status: Never     Smokeless tobacco: Never   Vaping Use     Vaping status: Never Used   Substance and Sexual Activity     Alcohol use: Not Currently     Comment: Last ETOH use was 2021     Drug use: Not Currently     Sexual activity: Not on file   Other Topics Concern     Parent/sibling w/ CABG, MI or angioplasty before 65F 55M? Not Asked   Social History Narrative     Not on file     Social Determinants of Health     Financial Resource Strain: Not on file   Food Insecurity: Not on file   Transportation Needs: Not on file   Physical Activity: Not on file   Stress: Not on file   Social Connections: Not on file   Intimate Partner Violence: Not on file   Housing Stability: Not on file     Reviewed with patient    FAMILY MEDICAL HISTORY:   Family History   Problem Relation Age of Onset     Lung Cancer Mother      Colon Cancer Father      Lung Cancer Brother      Heart Failure Brother      Kidney Disease Brother      Brother with kidney disease  Reviewed with patient     PHYSICAL EXAM:   Temp  Av.3  F (36.8  C)  Min: 97.9  F (36.6  C)  Max: 98.8  F (37.1  C)      Pulse  Av.6  Min: 69  Max: 106 Resp  Av.7  Min: 15  Max: 20  SpO2  Av.8 %  Min: 94 %  Max: 100 %       /79 (BP Location: Left arm, Patient Position: Semi-Greer's, Cuff Size: Adult Regular)   Pulse 94   Temp 98.2  F (36.8  C) (Oral)   Resp 16   Wt 99 kg (218 lb 3.2 oz)   SpO2 99%   BMI 33.19 kg/m        Admit Weight: 99.8 kg (220 lb)     GENERAL APPEARANCE: tired, NAD  EYES: no scleral icterus, pupils equal  Pulmonary: CTA  CV: RRR   - Edema trace  GI: soft   MS: no evidence of inflammation in joints, no muscle tenderness  : no stauffer  SKIN: no rash, warm, dry, no cyanosis  NEURO: face symmetric, a/o  Access: tunneled RIJ    LABS:   I have reviewed the following labs:  CMP  Recent Labs   Lab 04/10/23  0645 23  1556 23  0658 23  0617 23  1150     --  139 139 141    POTASSIUM 3.2*  --  3.5 4.0 3.9   CHLORIDE 103  --  106 106 104   CO2 19*  --  21* 20* 20*   ANIONGAP 15  --  12 13 17*   GLC 79 122* 75 81 83   BUN 60.3*  --  50.6* 32.2* 22.1   CR 5.19*  --  4.76* 3.62* 2.51*   GFRESTIMATED 12*  --  13* 18* 28*   NAZARIO 8.8  --  8.8 9.0 9.3   PROTTOTAL  --   --  4.5*  --  5.9*   ALBUMIN  --   --  2.6*  --  3.3*   BILITOTAL  --   --  0.9  --  1.0   ALKPHOS  --   --  139*  --  170*   AST  --   --  42  --  63*   ALT  --   --  35  --  46     CBC  Recent Labs   Lab 04/10/23  0645 04/09/23  0658 04/09/23  0141 04/08/23  1729 04/08/23  0617 04/07/23  1150   HGB 11.3* 10.9* 11.6* 11.9* 13.0* 13.4   WBC 9.5 10.2  --   --  14.1* 13.0*   RBC 3.71* 3.70*  --   --  4.32* 4.55   HCT 34.8* 34.7*  --   --  40.6 43.4   MCV 94 94  --   --  94 95   MCH 30.5 29.5  --   --  30.1 29.5   MCHC 32.5 31.4*  --   --  32.0 30.9*   RDW 17.4* 17.6*  --   --  17.5* 17.6*   * 133*  --   --  180 164     INR  Recent Labs   Lab 04/10/23  0645 04/09/23  0658 04/08/23  0617 04/07/23  1501   INR 1.82* 1.89* 1.51* 1.37*     ABGNo lab results found in last 7 days.   URINE STUDIES  Recent Labs   Lab Test 04/07/23  1246 02/13/23  0743 01/23/23  0828 01/15/23  1226   COLOR Yellow Yellow Light Yellow Light Yellow   APPEARANCE Clear Clear Clear Clear   URINEGLC Negative Negative Negative Negative   URINEBILI Negative Negative Negative Negative   URINEKETONE Negative Negative Negative Negative   SG 1.011 1.014 1.013 1.011   UBLD Moderate* Negative Negative Negative   URINEPH 5.5 5.0 6.0 5.5   PROTEIN 10* 10* 20* Negative   NITRITE Negative Negative Negative Negative   LEUKEST Negative Negative Negative Negative   RBCU 5* 1 <1 <1   WBCU 2 2 8* 3     No lab results found.  PTH  No lab results found.  IRON STUDIES  Recent Labs   Lab Test 11/22/22  0848   IRON 68   *   IRONSAT 31   HOLA 429*       IMAGING:  Reviewed    BRITTANY Atkins

## 2023-04-10 NOTE — PROGRESS NOTES
HEMODIALYSIS TREATMENT NOTE    Date: 4/10/2023  Time: 1:25 PM    Data:  Pre Wt: 99 kg    Desired Wt: 97.5 kg   Post Wt: 97.5 kg  Weight change: 1.5 kg  Ultrafiltration - Post Run Net Total Removed (mL):  1500 ml  Vascular Access Status: patent  Dialyzer Rinse:  clear  Total Blood Volume Processed: 78.3 Liters  Total Dialysis (Treatment) Time:  3.5 Hours    Lab:   No    Interventions/Assessment:  Received on bed, A/Ox4, SPO2: 95%  Pt. Dialyzed for 3.5 hrs via CVC, UF set for 2L at first but seen and examined by BRITTANY Green and order to dec. UF to 1.5L  CVC is clean, dry and intact  No lab drawn  CVC lumen locked with saline and changed cap guard  Handoff reports given to PCN     Plan:    Per renal team

## 2023-04-10 NOTE — PLAN OF CARE
Goal Outcome Evaluation:      Plan of Care Reviewed With: patient    Overall Patient Progress: improvingOverall Patient Progress: improving    Outcome Evaluation: A&O x4. VSS on RA. Scoring 0 on WH. Denies pain. Up with SBA. Scheduled lactulose given, BM x2. Renal diet, tolerating. HD today, tolerated.    Pt discharge home with wife. PIV removed. Belonging sent home with patient. AVS reviewed with pt & wife, and pt & family had no further questions

## 2023-04-10 NOTE — PROGRESS NOTES
Care Management Discharge Note    Discharge Date: 04/10/2023       Discharge Disposition: Home    Discharge Services: Out patient dialysis     Discharge DME: None    Discharge Transportation: family or friend will provide    Private pay costs discussed: Not applicable    PAS Confirmation Code:  Not applicable  Patient/family educated on Medicare website which has current facility and service quality ratings:  Not applicable    Education Provided on the Discharge Plan:  yes  Persons Notified of Discharge Plans: Patient, provider, [facility], nursing staff, SW  Patient/Family in Agreement with the Plan:  yes    Handoff Referral Completed: Yes    Additional Information:  Patient will discharge home with transportation provided by his wife Apoorva. [facility] called [facility] in [facility] and updated them that the patient is discharging and will resume scheduled dialysis. [facility] faxed discharge orders to [facility]     [facility] - [facility]  [address]  [facility] [address]  P: [phone]  F: [phone]  M, W, F @ [address]           Parth Marie

## 2023-04-11 ENCOUNTER — TELEPHONE (OUTPATIENT)
Dept: TRANSPLANT | Facility: CLINIC | Age: 68
End: 2023-04-11

## 2023-04-11 NOTE — TELEPHONE ENCOUNTER
Spoke with patient    Patient doing OK- not confused but still sleeping a lot.  Taking lactulose at least once a day, continues with multiple bowel movements.  Encouraged to take enough lactulose to maintain 4-5 BMs a day, as patient appears to do better with higher range of BMs.     Patient's wife was instructed to follow up re: TIPS.  Said may need revision due to encephalopathy.  Ultrasound in hospital done.  No ascites, may have signs of early dysfunction.  Will review with Dr.'s Bermudez/Virgilio     TIPS was originally placed at [facility], would prefer to follow up here.  Will review with hepatology to see if they feel referral to IR is warranted given recurrent encephalopathy despite frequent stool, lactulose and rifaximin therapy.

## 2023-04-12 LAB
BACTERIA BLD CULT: NO GROWTH
BACTERIA BLD CULT: NO GROWTH

## 2023-04-13 ENCOUNTER — MYC MEDICAL ADVICE (OUTPATIENT)
Dept: FAMILY MEDICINE | Facility: CLINIC | Age: 68
End: 2023-04-13
Payer: COMMERCIAL

## 2023-04-13 NOTE — TELEPHONE ENCOUNTER
See MyChart from patient needing PCP review.    Can patient take Allegra with current medications:      Alyssa Boss RN  [facility]

## 2023-04-17 ENCOUNTER — TELEPHONE (OUTPATIENT)
Dept: TRANSPLANT | Facility: CLINIC | Age: 68
End: 2023-04-17
Payer: COMMERCIAL

## 2023-04-17 ENCOUNTER — MYC MEDICAL ADVICE (OUTPATIENT)
Dept: FAMILY MEDICINE | Facility: CLINIC | Age: 68
End: 2023-04-17
Payer: COMMERCIAL

## 2023-04-17 DIAGNOSIS — I85.11 ESOPHAGEAL VARICES WITH BLEEDING IN DISEASES CLASSIFIED ELSEWHERE (H): Primary | ICD-10-CM

## 2023-04-17 RX ORDER — NICOTINE POLACRILEX 4 MG/1
40 GUM, CHEWING ORAL 2 TIMES DAILY
Qty: 360 TABLET | Refills: 1 | Status: ON HOLD | OUTPATIENT
Start: 2023-04-17 | End: 2023-06-25

## 2023-04-17 NOTE — TELEPHONE ENCOUNTER
Routing refill request to provider for review/approval because:  Medication is reported/historical  Last OV 3/9/23    Per hospital note 4/7/23:

## 2023-04-17 NOTE — TELEPHONE ENCOUNTER
"Spoke with patient's spouse. She is very concerned about his mental status.  Sleeping most of day, every day since discharge.  Will awaken and is groggy but oriented but then falls back to sleep mid conversation..  Had a good 1/2 day on Friday, but very sleepy since.  Up frequently with stooling, then goes back to sleep.      Battling with refractory AMS.  Having around 12 BMs a day.  \"Blacked out\" at dialysis. Said high BP, then lower during dialysis.  They have not been pulling fluid. Still makes urine.  Had lactulose once a day despite multiple BMs based on recommendations from inpatient team.     Spouse keeping him hydrated as much as possible.      Fair p.o., trying to give him protein shakes, sandwich whenever he is awake. Alert for about an hour at most.  Sleeping most of the day every day.        No signs of ascites per wife, only trace BLE edema.      No fevers, no other signs of infection.      Reviewed with Dr. Poole last week, will review with Dr. Poole and Lara again as persistent.    Patient is going to try and take some fiber/metamucil to see if it helps bulk stools and can safely get adequate     Has not taking any pain medications for over a month. No sedating medications.  Not diabetic.        UPDATE- spoke to spouse again this evening, slight improvements after most recent lactulose.  Advised to bring him in if she can't easily awaken him.  Trying to straighten days/nights, keeping him awake as possible.             "

## 2023-04-19 ENCOUNTER — OFFICE VISIT (OUTPATIENT)
Dept: CT IMAGING | Facility: CLINIC | Age: 68
End: 2023-04-19
Attending: INTERNAL MEDICINE
Payer: COMMERCIAL

## 2023-04-19 VITALS
SYSTOLIC BLOOD PRESSURE: 122 MMHG | DIASTOLIC BLOOD PRESSURE: 84 MMHG | HEART RATE: 94 BPM | BODY MASS INDEX: 33.81 KG/M2 | OXYGEN SATURATION: 100 % | WEIGHT: 222.31 LBS

## 2023-04-19 DIAGNOSIS — R19.7 DIARRHEA, UNSPECIFIED TYPE: Primary | ICD-10-CM

## 2023-04-19 PROCEDURE — 99215 OFFICE O/P EST HI 40 MIN: CPT | Performed by: INTERNAL MEDICINE

## 2023-04-19 PROCEDURE — 99212 OFFICE O/P EST SF 10 MIN: CPT | Performed by: INTERNAL MEDICINE

## 2023-04-19 PROCEDURE — 99417 PROLNG OP E/M EACH 15 MIN: CPT | Performed by: INTERNAL MEDICINE

## 2023-04-19 ASSESSMENT — PAIN SCALES - GENERAL: PAINLEVEL: NO PAIN (0)

## 2023-04-19 NOTE — NURSING NOTE
Chief Complaint   Patient presents with     Consult     cdiff     /84 (BP Location: Left arm, Patient Position: Sitting, Cuff Size: Adult Large)   Pulse 94   Wt 100.8 kg (222 lb 5 oz)   SpO2 100%   BMI 33.81 kg/m

## 2023-04-19 NOTE — PROGRESS NOTES
[facility]  Infectious Disease Outpatient ID Clinic Note - Follow-Up     Patient: Damion Quinones  : 1955  MRN: [id number]  Date of Visit: 23     Recommendations   1. Monitor symptoms and start vancomycin for development of fevers/chills, abdominal pain, and changes in stool color/odor while awaiting stool studies   2. Follow-up with GI for consideration and work-up of other causes of chronic diarrhea     Seen and discussed with Dr. Cage.    Lindsay Mejia MD  ID Fellow     Assessment   1. Recurrent CDI, initial episode + 2 recurrences over the past 6 months, recently completed a vancomycin taper x6 weeks  2. Cirrhosis s/p TIPS with HE  3. ESRD on HD    Damion is presenting for follow-up today. He was recently hospitalized for HE which may have been precipitated by a TIPS malfunction. He was also restarted on lactulose.     He recently completed a vancomycin taper x6 weeks (ending ). He had significant improvement while on vancomycin but developed worsening diarrhea towards the end of the course. His most recent diarrhea was characterized by innumerable dark brown loose large volume stools throughout the day and night. His diarrhea has slightly improved over the past day since starting fiber and cutting out fruit juice. His current stool pattern isn't like his stool pattern when he's been diagnosed with CD which is frequent green malodorous liquid large volume stools associated with abdominal pain.     Today, we discussed how chronic diarrhea is often multifactorial and requires a multidisciplinary approach. We agree with starting fiber and cutting out fruit juice which will hopefully be helpful going forward. We also encouraged him to follow-up with GI for consideration and work-up of other causes of chronic diarrhea such as a colonoscopy with biopsy.     If he were to develop fevers/chills, abdominal pain, or change in stool color/ordor, he has a  vancomycin prescription available to begin treatment while awaiting stools studies given he's at higher risk for CDI with his significant co-morbidities.     History of Present Illness:   Damion is a 67-year-old male with PMH of cirrhosis s/p TIPS and ESRD on HD presenting for follow-up of recurrent CDI. He's undergoing evaluation for liver/kidney transplant.     CDI history...  - Initiate episode: CD toxin positive 12/16 treated with vancomycin x10 days  - 1st recurrence: CD toxin positive 1/15 treated with fidoxamicin x10 days   - 2nd recurrence: CD PCR positive 2/14 ([facility]) treated with fidoxamicin x10 days followed by vancomycin taper x6 weeks (ending 4/11)     He was hospitalized 4/7-4/10 for HE which may have been precipitated by a TIPS malfunction. He was also restarted on lactulose.     He's seen today with his wife who provides much of the history. They report significant improvement while on the vancomycin taper, however, his diarrhea worsened towards the end of the course. He had over a week of innumerable dark brown loose large volume stools throughout the day and night. They don't notice a difference in his diarrhea with giving vs not giving lactulose. They discussed his diarrhea with a provider over the past few days who recommended starting fiber. He also stopped drinking fruit juice. He doesn't consume any sugar free drinks/foods. His stools have improved over the past day and are now less frequent and soft though they feel it's too soon to tell if this change will last. He hasn't had any fevers/chills or abdominal pain.    During his prior CD episodes, they recall him having frequent green malodorous liquid large volume stools associated with abdominal pain which isn't like his most recent stool pattern.     Overall, they feel frustrated and overwhelmed by his diarrhea given it's a fine line between having too few stools leading to HE and too many stools leading to dehydration.     Review  of Systems:   Complete ROS was performed with pertinent positives and negatives stated above.      Medications & Allergies:     Current Outpatient Medications:      apixaban ANTICOAGULANT (ELIQUIS) 5 MG tablet, Take 1 tablet (5 mg) by mouth 2 times daily, Disp: 60 tablet, Rfl: 2     bumetanide (BUMEX) 1 MG tablet, Take 2 tablets (2 mg) by mouth 2 times daily for 180 days, Disp: 360 tablet, Rfl: 1     Calcium Carbonate-Vitamin D 500-3.125 MG-MCG TABS, Take 1 tablet by mouth 2 times daily, Disp: , Rfl:      folic acid (FOLVITE) 1 MG tablet, TAKE FIVE TABLETS BY MOUTH EVERY DAY, Disp: , Rfl:      lactulose (CEPHULAC) 10 GM packet, Take 3 packets (30 g) by mouth daily, Disp: 30 packet, Rfl: 0     metoprolol succinate ER (TOPROL XL) 25 MG 24 hr tablet, Take 1 tablet by mouth daily at 2 pm, Disp: , Rfl:      omeprazole 20 MG tablet, Take 2 tablets (40 mg) by mouth 2 times daily, Disp: 360 tablet, Rfl: 1     predniSONE (DELTASONE) 5 MG tablet, Take 10 mg by mouth daily, Disp: , Rfl:      XIFAXAN 550 MG TABS tablet, Take 550 mg by mouth 2 times daily, Disp: , Rfl:      zinc oxide (DESITIN) 40 % paste, Apply topically as needed for irritation apply to buttock to prevent bleeding with wiping, Disp: 453 g, Rfl: 0     zinc sulfate (ZINCATE) 220 (50 Zn) MG capsule, Take 1 capsule (220 mg) by mouth daily, Disp: 90 capsule, Rfl: 0     oxyCODONE (ROXICODONE) 5 MG tablet, Take half to one tablet PO Q 8 hours as needed for pain. (Patient not taking: Reported on 4/19/2023), Disp: 15 tablet, Rfl: 0     spironolactone (ALDACTONE) 50 MG tablet, Take 25 mg by mouth daily (Patient not taking: Reported on 4/19/2023), Disp: , Rfl:      vancomycin (VANCOCIN) 125 MG capsule, Take 1 tab 2x/day for 2wks, then 1 tab 1x/day for 2 wks, then 1 tab every other day for 2 wks, then stop. Increase to 4x/day in the event of CDI symptoms (Patient not taking: Reported on 4/19/2023), Disp: 50 capsule, Rfl: 1    No Known Allergies         Physical Exam:   BP  122/84 (BP Location: Left arm, Patient Position: Sitting, Cuff Size: Adult Large)   Pulse 94   Wt 100.8 kg (222 lb 5 oz)   SpO2 100%   BMI 33.81 kg/m      General: Well developed/nourished. Appears comfortable.   HEENT: AT/NC. Sclera and conjunctiva clear. MMM.  Heart: RRR. No murmurs appreciated.   Lungs: Effort normal. CTAB. No wheezes or crackles.   Abdomen: Soft. No tenderness. Slightly distended.   Extremities: No gross deformities.  Skin: No suspicious lesions on exposed areas.   Neurologic: Mentation somewhat impaired (wife answers most questions). Speech normal. Some asterixis present.  Psychiatric: Mood appropriate. Behavior normal.   Access: Left chest HD line with dressing C/D/I.     Laboratory Data:   BMP  Recent Labs   Lab Test 04/10/23  0645 04/09/23  1556 04/09/23  0658 04/08/23  1002 04/08/23  0617 02/09/23  0930 01/28/23  0636     --  139  --  139   < > 133*   POTASSIUM 3.2*  --  3.5  --  4.0   < > 4.3   CHLORIDE 103  --  106  --  106   < > 99   CO2 19*  --  21*  --  20*   < > 19*   ANIONGAP 15  --  12  --  13   < > 15   BUN 60.3*  --  50.6*  --  32.2*   < > 58.4*   CR 5.19*  --  4.76*  --  3.62*   < > 4.85*   GFRESTIMATED 12*  --  13*  --  18*   < > 13*   GLC 79   < > 75  --  81   < > 84   NAZARIO 8.8  --  8.8  --  9.0   < > 8.7*   PHOS  --   --   --   --   --   --  5.6*   MAG  --   --   --   --   --   --  1.8   LACT 1.3   < > 1.3   < > 2.6*   < >  --     < > = values in this interval not displayed.       LFT  Recent Labs   Lab Test 04/09/23  0658 04/07/23  1150 02/13/23  0708 02/09/23  0930   BILITOTAL 0.9 1.0 1.0 0.6   DBIL 0.33*  --   --  0.28   ALKPHOS 139* 170* 235* 228*   PROTTOTAL 4.5* 5.9* 5.8* 5.2*   ALBUMIN 2.6* 3.3* 2.9* 2.7*   AST 42 63*  --  43   ALT 35 46 40 35       CBC  Recent Labs   Lab Test 04/10/23  0645 04/09/23  0658 04/08/23  1729 04/08/23  0617 04/07/23  1150   WBC 9.5 10.2  --  14.1* 13.0*   HGB 11.3* 10.9*   < > 13.0* 13.4   HCT 34.8* 34.7*  --  40.6 43.4   *  133*  --  180 164    < > = values in this interval not displayed.       Microbiology Data:   - CD toxin positive 12/16  - CD toxin positive 1/15 treated with fidoxamicin x10 days   - CD PCR positive 2/14 ([facility])      Imaging Data:   No recent CT A/P

## 2023-04-21 ENCOUNTER — TELEPHONE (OUTPATIENT)
Dept: CARDIOLOGY | Facility: CLINIC | Age: 68
End: 2023-04-21
Payer: COMMERCIAL

## 2023-04-21 NOTE — TELEPHONE ENCOUNTER
----- Message from Magda Roque sent at 4/21/2023  9:44 AM CDT -----  Morning Davin!    Could you please reach out to Casey today? His wife had just a couple questions about his eloquis.  Super nice lady. Thanks dear! I was able to reschedule them to an earlier check in time the same day.    Magda    --------------------------------  Spoke with wife and advised her last dose of Eliquis should be Monday 4/24/23 karlo.  She confirmed she purchased ASA for karlo before and morning of. Wife verbalized understanding, agreed with plan and denied any further questions. Deena aGgnon RN on 4/21/2023 at 3:26 PM

## 2023-04-24 ENCOUNTER — MYC MEDICAL ADVICE (OUTPATIENT)
Dept: CT IMAGING | Facility: CLINIC | Age: 68
End: 2023-04-24
Payer: COMMERCIAL

## 2023-04-24 ENCOUNTER — TELEPHONE (OUTPATIENT)
Dept: HEMATOLOGY | Facility: CLINIC | Age: 68
End: 2023-04-24
Payer: COMMERCIAL

## 2023-04-24 ENCOUNTER — TELEPHONE (OUTPATIENT)
Dept: TRANSPLANT | Facility: CLINIC | Age: 68
End: 2023-04-24
Payer: COMMERCIAL

## 2023-04-24 DIAGNOSIS — A04.71 RECURRENT CLOSTRIDIOIDES DIFFICILE DIARRHEA: Primary | ICD-10-CM

## 2023-04-24 RX ORDER — VANCOMYCIN HYDROCHLORIDE 125 MG/1
125 CAPSULE ORAL 4 TIMES DAILY
Qty: 40 CAPSULE | Refills: 3 | Status: SHIPPED | OUTPATIENT
Start: 2023-04-24 | End: 2023-05-04

## 2023-04-24 NOTE — TELEPHONE ENCOUNTER
[facility]  [facility]  [facility] [address], [address], [place] [address]  Main: [phone], Fax: [phone]    Telephone Note:    Patient: Damion Quinones  MRN: [id number]  : 1955  Date of this note written: 2023    Damion's wife, Apoorva, called this morning (2023) informing us that her , Damion, was admitted to [facility] for encephalopathy. Today, he underwent a bilateral venous ultrasound evaluation of the lower extremity at [facility] and Apoorva wanted to let us know about the imaging study as we did recommend that he is to get a repeat lower extremity venous ultrasound done 3 months after his 2023 diagnosis of right leg DVT.    2023 right leg Venous ultrasound:  1. Deep venous thrombosis in the right leg.   A. Non occlusive in the femoral vein in the lower thigh.  B. Occlusive in the popliteal vein.  C. Occlusive in the peroneal veins.    2023 bilateral leg venous ultrasound done at [facility]:  1.  Nonocclusive thrombus seen in the right popliteal vein with near occlusive thrombus in the posterior tibial vein in the proximal calf.   2.  No DVT in the left lower extremity.     This writer also received an Epic staff message from transplant clinic coordinator about the above and they are inquiring if he should restart anticoagulation therapy. Damion has stopped taking apixaban as of 2023 as per this writer's instructions. (see my note on 3/30/2023).     Assessment and Plan:  Damion's lower extremity venous ultrasound actually showed improvement of his DVT on the right leg found back in 2023. He now has chronic right popliteal vein thrombus and posterior tibial vein thrombus. He does NOT have any acute extension of his right leg DVT.     No need to restart anticoagulation therapy. No change in the plan that I have detailed on 3/30/2023 from a hematological standpoint.       Bhupinder LIPSCOMB  Bc JEFFERY, MPAS  Physician Assistant  [facility].

## 2023-04-24 NOTE — TELEPHONE ENCOUNTER
Call from wife    Admitted at [facility] for AMS,  had ultrasound BLE done at [facility].  Planned discharge today after dialysis.   Ultrasound was done as routine follow up since he was admitted there, was scheduled here originally.     Team there potentially recommending ongoing anticoagulation, but wants to discuss with us.  Uncertain if same clot burden vs worsening.      Wife wants team here to weigh in on.  Currently off Eliquis, plan for LHC later this week.  Doesn't want to start any kind of anticoagulation  Without talking with team here.  Team there is not ordering it. Wondering if needs to  Be on coumadin after LHC later this week given new imaging.      Patient's wife doesn't feel BLE edema worsening, not symptomatic.        WIll request images from ultrasound to be pushed here ASAP.

## 2023-04-27 ENCOUNTER — LAB (OUTPATIENT)
Dept: LAB | Facility: CLINIC | Age: 68
End: 2023-04-27
Attending: INTERNAL MEDICINE
Payer: COMMERCIAL

## 2023-04-27 ENCOUNTER — HOSPITAL ENCOUNTER (OUTPATIENT)
Facility: CLINIC | Age: 68
Discharge: HOME | End: 2023-04-27
Attending: INTERNAL MEDICINE | Admitting: INTERNAL MEDICINE
Payer: COMMERCIAL

## 2023-04-27 ENCOUNTER — APPOINTMENT (OUTPATIENT)
Dept: MEDSURG UNIT | Facility: CLINIC | Age: 68
End: 2023-04-27
Attending: INTERNAL MEDICINE
Payer: COMMERCIAL

## 2023-04-27 VITALS
TEMPERATURE: 98 F | DIASTOLIC BLOOD PRESSURE: 78 MMHG | HEART RATE: 80 BPM | BODY MASS INDEX: 33.04 KG/M2 | HEIGHT: 68 IN | OXYGEN SATURATION: 100 % | RESPIRATION RATE: 20 BRPM | WEIGHT: 218 LBS | SYSTOLIC BLOOD PRESSURE: 120 MMHG

## 2023-04-27 DIAGNOSIS — N18.6 ESRD (END STAGE RENAL DISEASE) ON DIALYSIS (H): Primary | ICD-10-CM

## 2023-04-27 DIAGNOSIS — Z99.2 ESRD (END STAGE RENAL DISEASE) ON DIALYSIS (H): Primary | ICD-10-CM

## 2023-04-27 DIAGNOSIS — I48.0 PAF (PAROXYSMAL ATRIAL FIBRILLATION) (H): ICD-10-CM

## 2023-04-27 DIAGNOSIS — Z01.810 PRE-OPERATIVE CARDIOVASCULAR EXAMINATION: ICD-10-CM

## 2023-04-27 DIAGNOSIS — Z76.82 ORGAN TRANSPLANT CANDIDATE: ICD-10-CM

## 2023-04-27 DIAGNOSIS — N18.5 CKD (CHRONIC KIDNEY DISEASE) STAGE 5, GFR LESS THAN 15 ML/MIN (H): ICD-10-CM

## 2023-04-27 PROBLEM — Z98.890 STATUS POST CORONARY ANGIOGRAM: Status: ACTIVE | Noted: 2023-04-27

## 2023-04-27 LAB
ANION GAP SERPL CALCULATED.3IONS-SCNC: 11 MMOL/L (ref 7–15)
APTT PPP: 28 SECONDS (ref 22–38)
ATRIAL RATE - MUSE: NORMAL BPM
BUN SERPL-MCNC: 30.3 MG/DL (ref 8–23)
CALCIUM SERPL-MCNC: 8.7 MG/DL (ref 8.8–10.2)
CHLORIDE SERPL-SCNC: 106 MMOL/L (ref 98–107)
CREAT SERPL-MCNC: 3.74 MG/DL (ref 0.67–1.17)
DEPRECATED HCO3 PLAS-SCNC: 25 MMOL/L (ref 22–29)
DIASTOLIC BLOOD PRESSURE - MUSE: NORMAL MMHG
ERYTHROCYTE [DISTWIDTH] IN BLOOD BY AUTOMATED COUNT: 18 % (ref 10–15)
GFR SERPL CREATININE-BSD FRML MDRD: 17 ML/MIN/1.73M2
GLUCOSE SERPL-MCNC: 82 MG/DL (ref 70–99)
HCT VFR BLD AUTO: 37.4 % (ref 40–53)
HGB BLD-MCNC: 11.8 G/DL (ref 13.3–17.7)
INR PPP: 1.14 (ref 0.85–1.15)
INTERPRETATION ECG - MUSE: NORMAL
MCH RBC QN AUTO: 30.1 PG (ref 26.5–33)
MCHC RBC AUTO-ENTMCNC: 31.6 G/DL (ref 31.5–36.5)
MCV RBC AUTO: 95 FL (ref 78–100)
P AXIS - MUSE: NORMAL DEGREES
PLATELET # BLD AUTO: 155 10E3/UL (ref 150–450)
POTASSIUM SERPL-SCNC: 3.2 MMOL/L (ref 3.4–5.3)
PR INTERVAL - MUSE: NORMAL MS
QRS DURATION - MUSE: 78 MS
QT - MUSE: 390 MS
QTC - MUSE: 435 MS
R AXIS - MUSE: 40 DEGREES
RBC # BLD AUTO: 3.92 10E6/UL (ref 4.4–5.9)
SODIUM SERPL-SCNC: 142 MMOL/L (ref 136–145)
SYSTOLIC BLOOD PRESSURE - MUSE: NORMAL MMHG
T AXIS - MUSE: -16 DEGREES
VENTRICULAR RATE- MUSE: 75 BPM
WBC # BLD AUTO: 11.4 10E3/UL (ref 4–11)

## 2023-04-27 PROCEDURE — 99152 MOD SED SAME PHYS/QHP 5/>YRS: CPT | Mod: GC | Performed by: INTERNAL MEDICINE

## 2023-04-27 PROCEDURE — 999N000134 HC STATISTIC PP CARE STAGE 3

## 2023-04-27 PROCEDURE — 250N000011 HC RX IP 250 OP 636: Performed by: INTERNAL MEDICINE

## 2023-04-27 PROCEDURE — 85027 COMPLETE CBC AUTOMATED: CPT | Performed by: INTERNAL MEDICINE

## 2023-04-27 PROCEDURE — 93454 CORONARY ARTERY ANGIO S&I: CPT | Mod: 26 | Performed by: INTERNAL MEDICINE

## 2023-04-27 PROCEDURE — 93005 ELECTROCARDIOGRAM TRACING: CPT

## 2023-04-27 PROCEDURE — 250N000013 HC RX MED GY IP 250 OP 250 PS 637: Performed by: INTERNAL MEDICINE

## 2023-04-27 PROCEDURE — C1894 INTRO/SHEATH, NON-LASER: HCPCS | Performed by: INTERNAL MEDICINE

## 2023-04-27 PROCEDURE — 85730 THROMBOPLASTIN TIME PARTIAL: CPT | Performed by: INTERNAL MEDICINE

## 2023-04-27 PROCEDURE — 999N000142 HC STATISTIC PROCEDURE PREP ONLY

## 2023-04-27 PROCEDURE — 99152 MOD SED SAME PHYS/QHP 5/>YRS: CPT | Performed by: INTERNAL MEDICINE

## 2023-04-27 PROCEDURE — 93010 ELECTROCARDIOGRAM REPORT: CPT | Performed by: INTERNAL MEDICINE

## 2023-04-27 PROCEDURE — 250N000009 HC RX 250: Performed by: INTERNAL MEDICINE

## 2023-04-27 PROCEDURE — 272N000001 HC OR GENERAL SUPPLY STERILE: Performed by: INTERNAL MEDICINE

## 2023-04-27 PROCEDURE — 85610 PROTHROMBIN TIME: CPT | Performed by: INTERNAL MEDICINE

## 2023-04-27 PROCEDURE — 93454 CORONARY ARTERY ANGIO S&I: CPT | Performed by: INTERNAL MEDICINE

## 2023-04-27 PROCEDURE — 999N000128 HC STATISTIC PERIPHERAL IV START W/O US GUIDANCE

## 2023-04-27 PROCEDURE — 258N000003 HC RX IP 258 OP 636: Performed by: INTERNAL MEDICINE

## 2023-04-27 PROCEDURE — 80048 BASIC METABOLIC PNL TOTAL CA: CPT | Performed by: INTERNAL MEDICINE

## 2023-04-27 PROCEDURE — 36415 COLL VENOUS BLD VENIPUNCTURE: CPT | Performed by: INTERNAL MEDICINE

## 2023-04-27 PROCEDURE — 999N000054 HC STATISTIC EKG NON-CHARGEABLE

## 2023-04-27 RX ORDER — NALOXONE HYDROCHLORIDE 0.4 MG/ML
0.4 INJECTION, SOLUTION INTRAMUSCULAR; INTRAVENOUS; SUBCUTANEOUS
Status: DISCONTINUED | OUTPATIENT
Start: 2023-04-27 | End: 2023-04-27 | Stop reason: HOSPADM

## 2023-04-27 RX ORDER — OXYCODONE HYDROCHLORIDE 10 MG/1
10 TABLET ORAL EVERY 4 HOURS PRN
Status: DISCONTINUED | OUTPATIENT
Start: 2023-04-27 | End: 2023-04-27 | Stop reason: HOSPADM

## 2023-04-27 RX ORDER — FLUMAZENIL 0.1 MG/ML
0.2 INJECTION, SOLUTION INTRAVENOUS
Status: DISCONTINUED | OUTPATIENT
Start: 2023-04-27 | End: 2023-04-27 | Stop reason: HOSPADM

## 2023-04-27 RX ORDER — ASPIRIN 81 MG/1
243 TABLET, CHEWABLE ORAL ONCE
Status: COMPLETED | OUTPATIENT
Start: 2023-04-27 | End: 2023-04-27

## 2023-04-27 RX ORDER — LIDOCAINE 40 MG/G
CREAM TOPICAL
Status: DISCONTINUED | OUTPATIENT
Start: 2023-04-27 | End: 2023-04-27 | Stop reason: HOSPADM

## 2023-04-27 RX ORDER — FENTANYL CITRATE 50 UG/ML
25 INJECTION, SOLUTION INTRAMUSCULAR; INTRAVENOUS
Status: DISCONTINUED | OUTPATIENT
Start: 2023-04-27 | End: 2023-04-27 | Stop reason: HOSPADM

## 2023-04-27 RX ORDER — ASPIRIN 325 MG
325 TABLET ORAL ONCE
Status: COMPLETED | OUTPATIENT
Start: 2023-04-27 | End: 2023-04-27

## 2023-04-27 RX ORDER — SODIUM CHLORIDE 9 MG/ML
INJECTION, SOLUTION INTRAVENOUS CONTINUOUS
Status: DISCONTINUED | OUTPATIENT
Start: 2023-04-27 | End: 2023-04-27 | Stop reason: HOSPADM

## 2023-04-27 RX ORDER — FENTANYL CITRATE 50 UG/ML
INJECTION, SOLUTION INTRAMUSCULAR; INTRAVENOUS
Status: DISCONTINUED | OUTPATIENT
Start: 2023-04-27 | End: 2023-04-27 | Stop reason: HOSPADM

## 2023-04-27 RX ORDER — OXYCODONE HYDROCHLORIDE 5 MG/1
5 TABLET ORAL EVERY 4 HOURS PRN
Status: DISCONTINUED | OUTPATIENT
Start: 2023-04-27 | End: 2023-04-27 | Stop reason: HOSPADM

## 2023-04-27 RX ORDER — POTASSIUM CHLORIDE 750 MG/1
20 TABLET, EXTENDED RELEASE ORAL
Status: DISCONTINUED | OUTPATIENT
Start: 2023-04-27 | End: 2023-04-27 | Stop reason: HOSPADM

## 2023-04-27 RX ORDER — POTASSIUM CHLORIDE 750 MG/1
40 TABLET, EXTENDED RELEASE ORAL
Status: COMPLETED | OUTPATIENT
Start: 2023-04-27 | End: 2023-04-27

## 2023-04-27 RX ORDER — NALOXONE HYDROCHLORIDE 0.4 MG/ML
0.2 INJECTION, SOLUTION INTRAMUSCULAR; INTRAVENOUS; SUBCUTANEOUS
Status: DISCONTINUED | OUTPATIENT
Start: 2023-04-27 | End: 2023-04-27 | Stop reason: HOSPADM

## 2023-04-27 RX ORDER — IOPAMIDOL 755 MG/ML
INJECTION, SOLUTION INTRAVASCULAR
Status: DISCONTINUED | OUTPATIENT
Start: 2023-04-27 | End: 2023-04-27 | Stop reason: HOSPADM

## 2023-04-27 RX ORDER — ATROPINE SULFATE 0.1 MG/ML
0.5 INJECTION INTRAVENOUS
Status: DISCONTINUED | OUTPATIENT
Start: 2023-04-27 | End: 2023-04-27 | Stop reason: HOSPADM

## 2023-04-27 RX ADMIN — POTASSIUM CHLORIDE 40 MEQ: 750 TABLET, EXTENDED RELEASE ORAL at 11:20

## 2023-04-27 RX ADMIN — SODIUM CHLORIDE: 9 INJECTION, SOLUTION INTRAVENOUS at 11:02

## 2023-04-27 ASSESSMENT — ACTIVITIES OF DAILY LIVING (ADL)
ADLS_ACUITY_SCORE: 35

## 2023-04-27 NOTE — PROGRESS NOTES
PIV discontinued. Discharge instructions reviewed with patient and his wife Nabila. Plan to discharge home at 1745. RFA groin site CDI, no hematoma. Patient tolerated regular diet, walked to BR with assist. Patient denies pain.

## 2023-04-27 NOTE — DISCHARGE INSTRUCTIONS
Going Home after a Coronary Angiogram  ______________________________________________    After you go home:  Have an adult stay with you for 24 hours.  Drink plenty of fluids.  You may eat your normal diet, unless your doctor tells you otherwise.  For 24 hours:  Relax and take it easy.  Do NOT smoke.  Do NOT make any important or legal decisions.  Do NOT drive or operate machines at home or at work.  Do NOT drink alcohol.  Remove the Band-Aid after 24 hours. If there is minor oozing, apply another Band-aid and remove it after 12 hours.  You may shower tomorrow. Do NOT take a bath, or use a hot tub or pool for at least 3 days.  Do not scrub the site. Do not use lotion or powder near the puncture site for 3 days.    Care of groin site  It is normal to have a small bruise or lump at the site.  For the first 2 days: Do not stoop or squat. When you cough, sneeze or move your bowels, hold your hand over the puncture site and press gently.  Do not lift more than 10 pounds for at least 3 to 5 days.  For 2 days, do NOT have sex or do any heavy exercise.    If you start bleeding from the site in your groin, lie down flat and press firmly  on the site. Call your doctor as soon as you can.    Medicines  Resume all home medications unless otherwise instructed by your doctor.    Call 911 right away if you have bleeding that is heavy or does not stop.    Call your doctor if:  You have a large or growing hard lump around the site.  The site is red, swollen, hot or tender.  Blood or fluid is draining from the site.  You have chills or a fever greater than 101 F (38 C).  Your leg or arm feels numb or cool.  You have hives, a rash or unusual itching.    [facility] [place]:  [phone] (7 days a week)

## 2023-04-27 NOTE — PROGRESS NOTES
Prep complete for Coronary Angiogram. Awaiting lab results. Wife Nabila at bedside cell# [phone].

## 2023-04-27 NOTE — Clinical Note
Potential access sites were evaluated for patency using ultrasound.   The right femoral artery was selected. Access was obtained under with Fluoroscopic guidance using a standard 18 guage needle with direct visualization of needle entry.

## 2023-04-27 NOTE — PROGRESS NOTES
Patient arrived via cart from [facility] s/p Coronary Angiogram. VSS. R groin site CDI, no hematoma. Flat bedrest until 1745 per MD orders. Patient denies pain. Wife Nabila at bedside.

## 2023-04-28 ENCOUNTER — TELEPHONE (OUTPATIENT)
Dept: CARDIOLOGY | Facility: CLINIC | Age: 68
End: 2023-04-28
Payer: COMMERCIAL

## 2023-04-28 RX ORDER — ZINC SULFATE 50(220)MG
220 CAPSULE ORAL DAILY
Qty: 90 CAPSULE | Refills: 0 | Status: CANCELLED | OUTPATIENT
Start: 2023-04-28

## 2023-04-28 NOTE — TELEPHONE ENCOUNTER
S-(situation): coronary angiogram done yesterday for evaluation for kidney &  liver transplant.   Severe single vessel CAD with  of the RCA.    Right femoral artery used for access. It is flat and dry, no complaints. Reviewed activity restrictions.    Patient states that his apixaban was discontinued on discharge.     B-(background): 67 y.o. male with PMH of ESLD s/p TIPS (10/22 here at [facility]) with recurrent HE, recurrent C diff s/p recent completion of prolonged po van taper, PAF on apixaban, ESRD on HD MWF, hereditary hemochromatosis who presents for coronary angiogram and possible PCI    A-(assessment): stable post cath     R-(recommendations): follow up with GEORGE, Recommend statin and anti-platelet therapy for hyperlipidemia and CAD.

## 2023-05-01 ENCOUNTER — OFFICE VISIT (OUTPATIENT)
Dept: FAMILY MEDICINE | Facility: CLINIC | Age: 68
End: 2023-05-01
Payer: COMMERCIAL

## 2023-05-01 VITALS
TEMPERATURE: 97.6 F | SYSTOLIC BLOOD PRESSURE: 120 MMHG | DIASTOLIC BLOOD PRESSURE: 82 MMHG | OXYGEN SATURATION: 97 % | HEART RATE: 81 BPM | WEIGHT: 224.8 LBS | BODY MASS INDEX: 34.18 KG/M2

## 2023-05-01 DIAGNOSIS — I10 ESSENTIAL HYPERTENSION: ICD-10-CM

## 2023-05-01 DIAGNOSIS — A04.72 C. DIFFICILE COLITIS: ICD-10-CM

## 2023-05-01 DIAGNOSIS — R60.0 PERIPHERAL EDEMA: Primary | ICD-10-CM

## 2023-05-01 PROBLEM — A04.71 RECURRENT CLOSTRIDIOIDES DIFFICILE DIARRHEA: Status: ACTIVE | Noted: 2023-02-15

## 2023-05-01 PROCEDURE — 99214 OFFICE O/P EST MOD 30 MIN: CPT | Performed by: FAMILY MEDICINE

## 2023-05-01 RX ORDER — SPIRONOLACTONE 25 MG/1
25 TABLET ORAL DAILY
Status: ON HOLD | COMMUNITY
Start: 2023-04-24 | End: 2023-06-25

## 2023-05-01 NOTE — PROGRESS NOTES
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    The patient has decompensated alcohol plus hemochromatosis related cirrhosis complicated by history of variceal bleeding status post TIPS.  He has a history of hepatic encephalopathy that is currently managed with maximal medical therapy. MELD-Na: 21. Last alcohol use in 2020; no recurrent use or cravings.    The patient has undergone cardiac testing including a Lexiscan with no inducible ischemia + 4/27/23 coronary angiogram showed mild to moderate CAD of LAD, 50% stenosis of ost RCA to mid RCA, and 100% stenosis of mid RCA. Discussion with cardiology + anesthesia, patient is optimized from a cardiac perspective pre-transplant. Plan to start aspirin 81mg + statin given the patient has CAD, metabolic syndrome + ESRD on HD.    Given pAFib, plan to start warfarin with goal INR of 2-3.     Listed for simultaneous liver + kidney transplantation on 5/9/2023, pending insurance approval.    Plan  -- Continue warfarin; goal INR 2-3  -- Okay for ASA 81mg + statin per cardiology given CAD  -- Midodrine: 2.5mg on MWF before HD  -- Continue lactulose + rifaximin along with zinc replacement  -- HCC screening - abdominal ultrasound and AFP every 6 months; due July 2023  -- No need for variceal screening given functioning TIPS  -- Encouraged ongoing activity to maintain muscle mass given deconditioning    Other:  -- CRC Surveillance: Repeat due in 2023 given 5 polyps removed in 2020; path: tubular adenomas    RTC: 3 months    Discussed with attending hepatologist, Dr. Leventhal Elizabeth Aby, MD  Transplant Hepatology  Fellow  [phone]    HPI:  Alcohol Cirrhosis  - hx HE  - no hx ascites  - hx variceal bleed s/p TIPS (10/1/2022)  - last EGD 9/2022: Small EV, IGV1 oozing, portal HTN gastropathy, IGV2 - s/p TIPS 10/1/2022  - HCC screening US 1/2023 - no hepatic lesions    Presented with his wife Nabila.    He has had several recent admissions including from April 23 to April 24 for hepatic encephalopathy, April 7 to April 10 for hepatic encephalopathy and an emergency department visit on 4/3/2023 for generalized weakness and altered mental status.  He actually presented to the emergency room this morning for chest discomfort that started yesterday.  The chest discomfort was right-sided in nature and associated with fatigue and nausea.  He has not had dialysis in the last 3 days because of complications in his dialysis port.  He is scheduled to have his port readjusted today and dialysis tomorrow morning.  He also has noted that he has been having increased fluid retention especially in his arms and legs.  No modifying factors to his chest pain such as shortness of breath or cough.  He was evaluated in the emergency department with no concerning factors and his chest pain resolved.    His biggest issue at this time is his volume overload and retention in the setting of not being able to undergo dialysis due to poor issues.  He has also been having issues with hypotension before dialysis and during dialysis that have limited his runs.  He was just started on midodrine 2.5 mg daily before dialysis sessions but he has not yet tried it.    In the setting of his recent hospitalizations he has not been exercising as much as he used to.  He was previously biking 6 miles per day but now he is only walking.  He walks every day with his wife.    He continues to urinate but only small amounts.    Medical hx Surgical hx   Past Medical History:   Diagnosis Date     Alcoholic cirrhosis of liver with ascites (H) 10/11/2019     C. difficile  colitis      Coronary artery disease     [facility] 4/2023 - complete occlusion of RCA     Diabetes mellitus type 2, uncomplicated (H) 10/11/2019     ESRD (end stage renal disease) on dialysis (H)      History of hemochromatosis 10/11/2019     Hypertension      Obesity      PAF (paroxysmal atrial fibrillation) (H)      Psoriatic arthritis (H)       Past Surgical History:   Procedure Laterality Date     APPENDECTOMY      Removed at 18 Years Old      COLONOSCOPY      2014 at [facility].      CV CORONARY ANGIOGRAM N/A 4/27/2023    Procedure: Coronary Angiogram;  Surgeon: Pablo Araujo MD;  Location:  HEART CARDIAC CATH LAB     EYE SURGERY Bilateral     Cataract     HERNIA REPAIR      History of bilateral inguinal hernia repair: 10/28/2014. Open hernia repair: 10/2017. Abdominal wound exploration and debridement 12/27/2017     INSERT SHUNT PORTAL TRANSJUGULAR INTRAHEPTIC  09/26/2022     IR CVC TUNNEL PLACEMENT > 5 YRS OF AGE  01/26/2023   + knee replacements x 2       Medications  Current Outpatient Medications   Medication Sig Dispense Refill     aspirin 81 MG EC tablet Take 1 tablet (81 mg) by mouth daily 90 tablet 3     bumetanide (BUMEX) 1 MG tablet Take 2 tablets (2 mg) by mouth 2 times daily for 180 days 360 tablet 1     Calcium Carbonate-Vitamin D 500-3.125 MG-MCG TABS Take 1 tablet by mouth 2 times daily       folic acid (FOLVITE) 1 MG tablet TAKE FIVE TABLETS BY MOUTH EVERY DAY       lactulose (CEPHULAC) 10 GM packet Take 3 packets (30 g) by mouth daily 30 packet 0     metoprolol succinate ER (TOPROL XL) 25 MG 24 hr tablet Take 1 tablet by mouth daily at 2 pm       midodrine (PROAMATINE) 2.5 MG tablet Take 1 tablet (2.5 mg) by mouth daily 45 tablet 3     omeprazole 20 MG tablet Take 2 tablets (40 mg) by mouth 2 times daily 360 tablet 1     predniSONE (DELTASONE) 5 MG tablet Take 10 mg by mouth daily       spironolactone (ALDACTONE) 25 MG tablet Take 25 mg by mouth daily Start when OK from  nephrology       XIFAXAN 550 MG TABS tablet Take 550 mg by mouth 2 times daily       zinc oxide (DESITIN) 40 % paste Apply topically as needed for irritation apply to buttock to prevent bleeding with wiping 453 g 0     warfarin ANTICOAGULANT (COUMADIN) 1 MG tablet Take 2 mg every Tuesday, Thursday, and Saturday; Take 1.5 mg all other days. Or as directed. 60 tablet 0     zinc sulfate (ZINCATE) 220 (50 Zn) MG capsule Take 1 capsule (220 mg) by mouth daily (Patient not taking: Reported on 5/1/2023) 90 capsule 0       Allergies  No Known Allergies    Family hx Social hx   Family History   Problem Relation Age of Onset     Lung Cancer Mother      Colon Cancer Father      Lung Cancer Brother      Heart Failure Brother      Kidney Disease Brother    - Brother: lung cancer in 50s  - Brother: heart disease (unclear what), renal disease (unclear what)  - Brother: colon issues (unclear what)   - Alcohol: as above  - Tobacco: Denies  - Drugs: Denies  -  (Apoorva), has two adult children   - Retired, previously in IT. Hasn't worked sine 2019.  - Stays active.  He has been using up recumbent bike every other day and then walking on days where he does not bike.     Review of systems  A 10-point review of systems was negative.    Examination  /76   Pulse 98   SpO2 98%   There is no height or weight on file to calculate BMI.    Gen-NAD  Eye- EOMI  ENT- MMM, normal oropharynx  CVS- RRR, no murmurs  RS- CTA bilaterally  Abd- Distended. Non-tender throughout. Umbilical hernia - reducible.   Skin-catheter in left upper chest  Extr- 2+ radial pulses bilaterally, trace lower extremity edema bilaterally  MS- hands without clubbing  Neuro- A+Ox3, mild asterixis  Skin- no rash. No jaundice. Duptyrens contractures bilaterally.  Ecchymoses on his arms bilaterally.  Psych- normal mood    Laboratory  BMP  Recent Labs   Lab Test 05/11/23  0923 05/11/23 0418 05/11/23  0413 04/27/23  0957 04/10/23  0645    139 139 142 137    POTASSIUM 3.3* 3.3* 3.4 3.2* 3.2*   CHLORIDE 103  --  102 106 103   NAZARIO 8.8  --  8.7* 8.7* 8.8   CO2 25  --  25 25 19*   BUN 47.5*  --  46.4* 30.3* 60.3*   CR 4.20*  --  4.19* 3.74* 5.19*   GLC 75 74 82 82 79     CBC  Recent Labs   Lab Test 05/11/23  0923 05/11/23  0418 05/11/23 0413 04/27/23  0957 04/10/23  0645   WBC 8.5  --  9.3 11.4* 9.5   RBC 3.59*  --  3.41* 3.92* 3.71*   HGB 11.1* 10.2* 10.3* 11.8* 11.3*   HCT 33.7* 30* 32.6* 37.4* 34.8*   MCV 94  --  96 95 94   MCH 30.9  --  30.2 30.1 30.5   MCHC 32.9  --  31.6 31.6 32.5   RDW 17.7*  --  17.8* 18.0* 17.4*   *  --  135* 155 130*     Liver Function Studies - Recent Labs   Lab Test 02/09/23  0930   PROTTOTAL 5.2*   ALBUMIN 2.7*   BILITOTAL 0.6   ALKPHOS 228*   AST 43   ALT 35       INR   INR   Date Value Ref Range Status   05/11/2023 1.09 0.85 - 1.15 Final      Iron sat 41%; ferritin 896  MIGUEL A positive  Hep A IgG negative  Hep B s Ag negative  Hep B s Ab < 2.0  Hep B c Ab negative  Hep C ab negative    Hemochromatosis: negative C282Y, homozygous H63D, negative S65C    Radiology  CT PE 5/11/2023  1. No CT evidence of pulmonary embolus.  2. Mild bibasilar pleural thickening likely benign with no nodule or evidence of infection.  3. TIPS.  4. Atherosclerosis.  5. Osteopenia.    Abdominal US TIPS 1/2023  1. Visualization limited due to patient body habitus and inability to cooperate with the examination.\  2. Patent TIPS. Right and left portal veins are appropriatelyretrograde in flow towards the TIPS.    TTE 9/25/22  1. Technically difficult exam.   2. The left ventricular size is normal.  Systolic function is normal.The estimated ejection fraction is 60-65%.  Wall thickness is mildly to moderately increased.  Left ventricular segmental wall motion is grossly normal, however endocardial definition is limited.   3. Visualized limited portions of the right ventricle are normal.  4. The left atrium is severely enlarged.   5. There is likely borderline aortic  "valve stenosis with a peak velocity of 149.00 cm/s, mean gradient of 5 mmHg, and aortic valve area of 1.87 cm2. Dimensionless indez of 0.54  6. The proximal ascending aorta measures 3.80 cm with an index of 1.66 cm/m2.   7. There is no gross pericardial effusion.     Renal Biopsy 9/9/2022  A-C. Native Kidney biopsy:  - Focal global and focal segmental glomerular sclerosis (13% and 7%) with glomerular IgA deposition, see comment.  Interstitial hemorrhage and tubulointerstitial inflammation.  - Severe interstitial fibrosis and tubular atrophy (~80%).  - Severe arteriosclerosis.     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 In regards to the MPO positivity, necrotizing/crescentic glomular injury is not observed, and overt vasculitis is not identified. However, the presence of interstitial hemorrhage and tubulointerstitial inflammation raise the possibility of an unsampled vasculitis.    DEXA 2018: No evidence of osteopenia/osteoporosis.     Endoscopy  EGD 9/2022: Small EV, IGV1 oozing, portal HTN gastropathy, IGV2, no gastric ulcers, normal duodenum    Colonoscopy 7/31/2020:  - Four 2 to 3 mm polyps in the descending colon, in  the transverse colon and in the ascending colon, removed with a jumbo cold forceps. Resected and retrieved.  - One 8 mm polyp in the descending colon, removed with a hot snare. Resected and retrieved. Clip (MR conditional) was placed.   Pathology: Tubular adenomas   "

## 2023-05-01 NOTE — PROGRESS NOTES
Assessment & Plan     Peripheral edema  Patient with significant peripheral edema.  Not a good candidate for diuretics given end-stage renal disease.  Will follow-up with his nephrologist.  I recommended compression therapy and elevation.  Has some redness at the elbow but not excessively warm and prior episodes have not responded to antibiotics so I am not suspicious of acute cellulitis.  They will continue to monitor and if there are signs of infection such as fever I have asked them to let me know right away.  Has physical therapy consultation tomorrow and I recommended they check with PT to see if there is any additional treatment that could be done there as well    Essential hypertension  Blood pressure is under good control with current regimen.  Has had some episodes of orthostatic hypotension.  Have recommended using his walker and moving slow.  Important to keep blood pressure under control to protect the kidneys but that will make him more prone to orthostatic episodes particularly given dialysis status.  Discussed importance of moving slowly from laying to sitting and sitting to standing before he starts walking.    C. difficile colitis  Patient with recurrent episodes of C. difficile colitis.  Not active at this time.  Has vancomycin at home on file to use if symptoms recur.    Patient has upcoming appointments with cardiology nurse practitioner.  We will follow-up on angiogram at that time.  Also will be hearing from transplant team.  Will be having consultation with general surgery in the near future for discussion of fistula.        35 minutes spent by me on the date of the encounter doing chart review, history and exam, documentation and further activities per the note     MED REC REQUIRED  Post Medication Reconciliation Status:  Discharge medications reconciled, continue medications without change    BMI:   Estimated body mass index is 34.18 kg/m  as calculated from the following:    Height as of  "4/27/23: 1.727 m (5' 8\").    Weight as of this encounter: 102 kg (224 lb 12.8 oz).     MED REC REQUIRED  Post Medication Reconciliation Status: discharge medications reconciled and changed, per note/orders          Gary Serrano MD  [facility]    Lopez Bruno is a 67 year old, presenting for the following health issues:  Rectal Problem        5/1/2023    12:57 PM   Additional Questions   Roomed by SMA Romina   Accompanied by wife         5/1/2023    12:57 PM   Patient Reported Additional Medications   Patient reports taking the following new medications none     History of Present Illness       CKD: He uses over the counter pain medication, including med vitiam, one time daily.    He eats 2-3 servings of fruits and vegetables daily.He consumes 1 sweetened beverage(s) daily.He exercises with enough effort to increase his heart rate 9 or less minutes per day.  He exercises with enough effort to increase his heart rate 3 or less days per week.   He is taking medications regularly.     Reviewed recent updates in his ongoing care.  Patient underwent angiogram.  Results are reviewed below.  I did discuss these with patient and his wife.  Overall they are pleased as angiogram did not show need for additional follow-up and recent ultrasound showed stability in thrombosis so hematology has discontinued his Xarelto.    Primary concern at this time is with significant peripheral edema.  His arms have been very swollen.  There is skin changes noted with discoloration.  Edema is pitting.  Wife notes that she did give him 2 doses of spironolactone over weekend and it did seem to help with diuresis but concerned about hypotension.  He has had multiple episodes of orthostatic hypotension with fainting and falls.    Reviewed angiogram results. Cardiology follow up is scheduled for 5/10.   Left Main   The vessel is moderate in size and is angiographically normal.      Left Anterior " Descending   The vessel is moderate in size.   Prox LAD lesion is 30% stenosed.      First Diagonal Branch   The vessel is moderate in size.   1st Diag lesion is 50% stenosed.      First Septal Branch   The vessel is small.      Second Diagonal Branch   The vessel is small.   2nd Diag lesion is 50% stenosed.      Second Septal Branch   The vessel is small.      Third Diagonal Branch   The vessel is small and is angiographically normal.      Left Circumflex   The vessel is moderate in size and is angiographically normal.      First Obtuse Marginal Branch   The vessel is large and is angiographically normal.      Lateral First Obtuse Marginal Branch   The vessel is moderate in size.      Second Obtuse Marginal Branch   The vessel is small and is angiographically normal.      Right Coronary Artery   The vessel is moderate in size.   Ost RCA to Mid RCA lesion is 50% stenosed.   Mid RCA lesion is 100% stenosed.   Mid RCA to Dist RCA lesion is 50% stenosed.      Right Posterior Descending Artery   The vessel is small.   Collaterals   RPDA filled by collaterals from Dist LAD.         Right Posterior Atrioventricular Artery   The vessel is small and is angiographically normal.   Collaterals   RPAV filled by collaterals from Dist Cx.         First Right Posterolateral Branch   The vessel is small and is angiographically normal.      Second Right Posterolateral Branch   The vessel is small and is angiographically normal.                  Review of Systems         Objective    /82 (BP Location: Right arm, Patient Position: Sitting, Cuff Size: Adult Large)   Pulse 81   Temp 97.6  F (36.4  C) (Tympanic)   Wt 102 kg (224 lb 12.8 oz)   SpO2 97%   BMI 34.18 kg/m    Body mass index is 34.18 kg/m .  Physical Exam   Alert cooperative in no acute distress  Neck supple with no lymphadenopathy  Heart regular rate and rhythm  Lungs clear to auscultation bilaterally  Bilateral arms with pitting 3+ edema, hands with rubor and  cool to the touch normal capillary refill, area of erythema around right elbow without drainage or significant warmth

## 2023-05-01 NOTE — CONFIDENTIAL NOTE
Spoke with Apoorva (wife) and she asked us to clarify if he should stop the Eliquis as hematology had requested or if he should continue because of his atrial fibrillation.

## 2023-05-02 ENCOUNTER — HOSPITAL ENCOUNTER (OUTPATIENT)
Dept: PHYSICAL THERAPY | Facility: REHABILITATION | Age: 68
Discharge: HOME | End: 2023-05-02
Payer: COMMERCIAL

## 2023-05-02 ENCOUNTER — TELEPHONE (OUTPATIENT)
Dept: TRANSPLANT | Facility: CLINIC | Age: 68
End: 2023-05-02

## 2023-05-02 ENCOUNTER — COMMITTEE REVIEW (OUTPATIENT)
Dept: TRANSPLANT | Facility: CLINIC | Age: 68
End: 2023-05-02

## 2023-05-02 DIAGNOSIS — N18.5 CHRONIC KIDNEY DISEASE, STAGE 5, KIDNEY FAILURE (H): ICD-10-CM

## 2023-05-02 DIAGNOSIS — K70.30 ALCOHOLIC CIRRHOSIS, UNSPECIFIED WHETHER ASCITES PRESENT (H): ICD-10-CM

## 2023-05-02 DIAGNOSIS — Z74.09 IMPAIRED FUNCTIONAL MOBILITY, BALANCE, GAIT, AND ENDURANCE: Primary | ICD-10-CM

## 2023-05-02 DIAGNOSIS — K70.31 ALCOHOLIC CIRRHOSIS OF LIVER WITH ASCITES (H): Primary | ICD-10-CM

## 2023-05-02 DIAGNOSIS — K70.30 ALCOHOLIC CIRRHOSIS (H): Primary | ICD-10-CM

## 2023-05-02 PROCEDURE — 97535 SELF CARE MNGMENT TRAINING: CPT | Mod: GP

## 2023-05-02 NOTE — PROGRESS NOTES
Beginning/End Dates of Progress Note Reporting Period:  5/2/23 to 3/28/23    Progress Toward Goals:   Progress limited due to complications related to his liver/kidney failure.    Client Self (Subjective) Report for Progress Note Reporting Period: Casey presents with his wife (Nabila) who helps with the history. She reports that since his last visit he went to the ER several times and he was hospitalized each time as a result of complications from his kidney/liver fluid. He has notable bilateral arm swelling which they are unsure how to address, as he also has issues with orthostatic hypotension, so they don't want to remove too much fluid. He had an angiogram last week, and they report that per the results, they should here back about the transplant list within the next week or two.    Outcome Measures (Most Recent Score):    LEFS: 59/80    Objective Measurements:   Objective Measure: 30 Second Sit-to-stand  Details: 3/28/23: 15 reps  Objective Measure: 6 MWT  Details: 3/28/23: 391.0 meters  Objective Measure: Worst Pain  Details: 3/28/23: 1-2/10 (right hand)  Objective Measure: Upper Extremity Strength  Details: 3/28/23: Elbow flexion/extension: 5/5 on left; not tested on right.  Objective Measure: Lower Extremity Strength  Details: 3/28/23: Bilateral hip flexion: 5/5  Objective Measure: FGA  Details: 3/28/23: 24/30

## 2023-05-02 NOTE — COMMITTEE REVIEW
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 Leventhal, MD   Transplant Surgery Conor Dc MD, Lorna Naik, NP, Randal Yolanda Ratliff MD, Chad Clifton MD, Damion Gibbons MD, Radha Sykes MD, MD       Transplant Eligibility: Cirrhosis with MELD, ETOH    Committee Review Decision: Needs Re-presentation    Relative Contraindications: Other    Absolute Contraindications: None    Committee Chair Leventhal, Thomas Michael, MD verbally attested to the committee's decision.    Committee Discussion Details:      Would like input from Anesthesiology on cardiac risk.  Re-discuss after evaluation from Anesthesia provider    Should be on Coumadin with INR goal 2-3, refer to coumadin clinic

## 2023-05-02 NOTE — TELEPHONE ENCOUNTER
Spoke with Casey and wife Apoorva-    Discussed selection review of [facility] done and cardiology assessment last week    Plan: per team, plan for Anesth consult ASAP    Needs to start on Coumadin.  Message to Bhupinder Yancey, patient should be started on it for cardiac and AFIB risk

## 2023-05-03 ENCOUNTER — TELEPHONE (OUTPATIENT)
Dept: FAMILY MEDICINE | Facility: CLINIC | Age: 68
End: 2023-05-03
Payer: COMMERCIAL

## 2023-05-03 DIAGNOSIS — I48.20 CHRONIC ATRIAL FIBRILLATION (H): Primary | ICD-10-CM

## 2023-05-03 NOTE — PROGRESS NOTES
Patient with chronic atrial fibrillation.  Had also had a DVT and so was on Eliquis.  The hematologist that it was okay to go off of Eliquis but for his chronic atrial fibrillation and in preparation for potentially undergoing liver and kidney transplant.     I am happy to choose a dose to get him started but wanted to reach out to see if there is a protocol for starting that you would like for me to follow.    I have not yet talked to the patient because I wanted to see what your recommendations are first.

## 2023-05-03 NOTE — TELEPHONE ENCOUNTER
"Dr. Araujo has spoken with GI/Hepatology and they agree that Damion should be on anticoagulation for his atrial fibrillation.    \"he has a [id number] of 2 if you don't count his DVT history under thromboembolism or 4 if you could his DVT history under thromboembolism. From our perspective, we think the benefits of anticoagulation would outweigh the risks of bleeding - which is the risk is mitigated in the setting of TIPS.\"      They have agreed on running his INRs 2-3 and I will speak with his primary care team about starting the medication.  I have talked with Casey's wife  Apoorva and she understands and agrees to the plan. They prefer the PCP manage the INR since he will be getting his lab work in [place].    "

## 2023-05-04 ENCOUNTER — TELEPHONE (OUTPATIENT)
Dept: SLEEP MEDICINE | Facility: CLINIC | Age: 68
End: 2023-05-04
Payer: COMMERCIAL

## 2023-05-04 NOTE — TELEPHONE ENCOUNTER
Peer to Peer Request    Patient Name:                    Damion Quinones  :                                      1955  MRN:                                      [id number]    Dr. Knott,    The authorization for procedure (description) 94950 on date of service 2023 has been denied. We were unsuccessful in obtaining approval through clinical review. A [id number] review can be done by calling the insurance/third party authorization vendor with the following information:    Insurance:       [facility]  Auth Vendor:   [employer]  Phone:             [phone] prompt [phone] [phone]  Due:                ASAP  Clinicals already Provided  Order:                          N/A  Visit Notes:                  OV 2023  Results:                       N/A  Other:                          N/A  Patient ID:       [id number]  Case/Ref #:     [id number]  Patient contact:           N/A  Patient response:        N/A  Patient Phone #:         [phone]    Denial Reason:    If you feel you have additional clinical information that could get this denial overturned, please complete the Peer to Peer.

## 2023-05-04 NOTE — PROGRESS NOTES
[facility] also on this [facility]    Closing encounter    Darlene Lares, [facility]

## 2023-05-05 ENCOUNTER — TELEPHONE (OUTPATIENT)
Dept: ANTICOAGULATION | Facility: CLINIC | Age: 68
End: 2023-05-05
Payer: COMMERCIAL

## 2023-05-05 DIAGNOSIS — I48.20 CHRONIC ATRIAL FIBRILLATION (H): Primary | ICD-10-CM

## 2023-05-05 NOTE — TELEPHONE ENCOUNTER
"ANTICOAGULATION  MANAGEMENT: NEW REFERRAL      SUBJECTIVE/OBJECTIVE     Damion Quinones, a 67 year old male  is newly referred to [facility].    Anticoagulation:    Previously on warfarin: No, has been on Apixaban (Eliquis); transitioning to warfarin.  Warfarin initiation date (approximate): 5/5/23   Indication(s): Atrial Fibrillation   Goal Range: 2.0-3.0   Anticoagulation Bridge/Overlap: No   Referring provider: from PCP    General Dietary/Social Hx:    Typical vitamin K intake: moderate; consistent     Other dietary considerations: Regularly drinks a nutritional supplement drink (Boost, Ensure, etc)     Social History:   Social History     Tobacco Use     Smoking status: Never     Passive exposure: Never     Smokeless tobacco: Never   Vaping Use     Vaping status: Never Used   Substance Use Topics     Alcohol use: Not Currently     Comment: Last ETOH use was 12/31/2021     Drug use: Not Currently       In the past 2 weeks, patient estimates taking medications as instructed % of time: 100%    Results:        No results for input(s): INR, [id number], F2, ALMWH in the last 168 hours.    Wt Readings from Last 2 Encounters:   05/01/23 102 kg (224 lb 12.8 oz)   04/27/23 98.9 kg (218 lb)      Estimated body mass index is 34.18 kg/m  as calculated from the following:    Height as of 4/27/23: 1.727 m (5' 8\").    Weight as of 5/1/23: 102 kg (224 lb 12.8 oz).  Lab Results   Component Value Date    AST 42 04/09/2023     Lab Results   Component Value Date    CR 3.74 (H) 04/27/2023     Estimated Creatinine Clearance: 22.8 mL/min (A) (based on SCr of 3.74 mg/dL (H)).    ASSESSMENT       Goal INR 2-3, standard for indication(s) above  Establishing initial warfarin maintenance dose (on warfarin < 30 days) Factors that may increase sensitivity to warfarin: Kidney Disease and Liver Disease  Factors that may reduce sensitivity to warfarin: Male Gender and Weight > 90 kg  Potential significant drug " interactions with home medications: None noted    Starting warfarin dose is appropriate for patient's anticipated sensitivity to warfarin (see MyChart message encounter from 5/3/23 for [facility] recommendation for starting warfarin dosing)    PLAN     Dosing Instructions: Start warfarin with INR after 3-4 days of dosing    Summary  As of 5/5/2023    Full warfarin instructions:  1 mg every day   Next INR check:               Education provided:     Taking warfarin: purpose of warfarin and how it works, take warfarin at same time each day; preferably in the evening and importance of following [facility] instructions vs instructions on the prescription bottle    Goal range and lab monitoring: Importance of therapeutic range, Importance of following up at instructed interval and frequency of lab work when starting warfarin and importance of following up when instructed (extends after stability established)    Dietary considerations: importance of consistent vitamin K intake, impact of vitamin K foods on INR, vitamin K content of foods, Impact of protein intake on INR  and importance of notifying [facility] to changes in diet    Interaction IS anticipated between warfarin and medications such as antibiotics and steroids    Symptom monitoring: monitoring for bleeding signs and symptoms, monitoring for clotting signs and symptoms, monitoring for stroke signs and symptoms, when to seek medical attention/emergency care and if you hit your head or have a bad fall seek emergency care    Importance of notifying anticoagulation clinic for: changes in medications; a sooner lab recheck maybe needed, diarrhea, nausea/vomiting, reduced intake, cold/flu, and/or infections; a sooner lab recheck maybe needed, upcoming surgeries and procedures 2 weeks in advance and if you did not receive dosing instructions on the same day as your labs were checked    Contact [phone] with any changes, questions or concerns.     Education still needed:     Symptom  monitoring: monitoring for bleeding signs and symptoms, monitoring for clotting signs and symptoms, monitoring for stroke signs and symptoms, when to seek medical attention/emergency care and if you hit your head or have a bad fall seek emergency care    Importance of notifying anticoagulation clinic for: changes in medications; a sooner lab recheck maybe needed, diarrhea, nausea/vomiting, reduced intake, cold/flu, and/or infections; a sooner lab recheck maybe needed, upcoming surgeries and procedures 2 weeks in advance and if you did not receive dosing instructions on the same day as your labs were checked      Telephone call with  patient and wife, Apoorva who agrees to plan and repeated back plan correctly    Lab visit scheduled    Standing orders placed in Epic: Point of Care INR (Lab 5000)    Plan made with [facility] Pharmacist Darlene Lares/Charles Maharaj, RN  Anticoagulation Clinic  5/5/2023

## 2023-05-05 NOTE — TELEPHONE ENCOUNTER
New patient pack placed in the [place] out going Mail on 5/5/2023.    Cami Martin RN    [facility] Anticoagulation [facility]

## 2023-05-07 ENCOUNTER — HEALTH MAINTENANCE LETTER (OUTPATIENT)
Age: 68
End: 2023-05-07

## 2023-05-08 ENCOUNTER — TELEPHONE (OUTPATIENT)
Dept: TRANSPLANT | Facility: CLINIC | Age: 68
End: 2023-05-08
Payer: COMMERCIAL

## 2023-05-08 NOTE — PROGRESS NOTES
[facility] [place] [facility] [place]      HPI:   Mr. Quinones is a 67 year old male with medical history pertinent for alcoholic cirrosis, hepatic encephalopathy s/p TIPS, CKD, DVT, and recent c.diff colitis. His last cardiology clinic visit was 12/2022 for pre-transplant workup. He presents to clinic today with his wife for coronary angiogram follow up.    Casey notes no new symptoms post angiogram. He and his wife report that he has been experiencing episodes of intermittent dizziness in the past few months (10-12 episodes/1 month). The dizziness is often correlated with position changes. He has not fainted. He has been slower in his positional changes and has started using his walker for extra support.     His BP has been normal at home and in clinic, but when he goes to iHD it is often low enough that they won't pull fluid off (SBPs 80-100s). This has resulted in him feeling very swollen in his arms, and developing weeping blisters.    PAST MEDICAL HISTORY:  Past Medical History:   Diagnosis Date     Alcoholic cirrhosis of liver with ascites (H) 10/11/2019     Arthritis     RA     C. difficile colitis      Chronic kidney disease      Coronary artery disease     AFib 2016     Diabetes mellitus type 2, uncomplicated (H) 10/11/2019     History of blood transfusion     2016     History of hemochromatosis 10/11/2019     Hypertension      Hypokalemia      Obesity      PAF (paroxysmal atrial fibrillation) (H)      Psoriatic arthritis (H)        FAMILY HISTORY:  Family History   Problem Relation Age of Onset     Lung Cancer Mother      Colon Cancer Father      Lung Cancer Brother      Heart Failure Brother      Kidney Disease Brother        SOCIAL HISTORY:  Social History     Socioeconomic History     Marital status:    Tobacco Use     Smoking status: Never     Passive exposure: Never     Smokeless tobacco: Never   Vaping Use     Vaping status: Never Used   Substance and Sexual Activity     Alcohol use:  Not Currently     Comment: Last ETOH use was 12/31/2021     Drug use: Not Currently       CURRENT MEDICATIONS:  bumetanide (BUMEX) 1 MG tablet, Take 2 tablets (2 mg) by mouth 2 times daily for 180 days  Calcium Carbonate-Vitamin D 500-3.125 MG-MCG TABS, Take 1 tablet by mouth 2 times daily  folic acid (FOLVITE) 1 MG tablet, TAKE FIVE TABLETS BY MOUTH EVERY DAY  lactulose (CEPHULAC) 10 GM packet, Take 3 packets (30 g) by mouth daily  metoprolol succinate ER (TOPROL XL) 25 MG 24 hr tablet, Take 1 tablet by mouth daily at 2 pm  omeprazole 20 MG tablet, Take 2 tablets (40 mg) by mouth 2 times daily  predniSONE (DELTASONE) 5 MG tablet, Take 10 mg by mouth daily  spironolactone (ALDACTONE) 25 MG tablet, Take 25 mg by mouth daily Start when OK from nephrology  XIFAXAN 550 MG TABS tablet, Take 550 mg by mouth 2 times daily  zinc oxide (DESITIN) 40 % paste, Apply topically as needed for irritation apply to buttock to prevent bleeding with wiping  zinc sulfate (ZINCATE) 220 (50 Zn) MG capsule, Take 1 capsule (220 mg) by mouth daily (Patient not taking: Reported on 5/1/2023)    No current facility-administered medications on file prior to visit.      ROS:   Refer to HPI    EXAM:  /81 (BP Location: Left arm, Patient Position: Sitting, Cuff Size: Adult Large)   Pulse 88   Wt 106.9 kg (235 lb 9.6 oz)   SpO2 100%   BMI 35.82 kg/m    GENERAL: Appears comfortable, in no acute distress.   HEENT: Eye symmetrical, no discharge or icterus bilaterally. Mucous membranes moist and without lesions.  CV: Irregularly irregular, +S1S2, no murmur, rub, or gallop. JVP difficult to appreciate .   RESPIRATORY: Respirations regular, even, and unlabored. Lungs CTA throughout.   GI: Soft, mildly distended with normoactive bowel sounds present in all quadrants. No tenderness, rebound, guarding.  EXTREMITIES: 4+ upper extremity edema with pitting, +2 LE peripheral edema. 2+ bilateral pedal pulses.   NEUROLOGIC: Alert and oriented x 3. No  focal deficits.   MUSCULOSKELETAL: No joint swelling or tenderness.   SKIN: Multiple upper arm blisters, upper arms mottled. No jaundice.      Labs, reviewed with patient in clinic today:  CBC RESULTS:  Lab Results   Component Value Date    WBC 11.4 (H) 04/27/2023    RBC 3.92 (L) 04/27/2023    HGB 11.8 (L) 04/27/2023    HCT 37.4 (L) 04/27/2023    MCV 95 04/27/2023    MCH 30.1 04/27/2023    MCHC 31.6 04/27/2023    RDW 18.0 (H) 04/27/2023     04/27/2023       CMP RESULTS:  Lab Results   Component Value Date     04/27/2023    POTASSIUM 3.2 (L) 04/27/2023    POTASSIUM 4.6 08/27/2019    CHLORIDE 106 04/27/2023    CO2 25 04/27/2023    ANIONGAP 11 04/27/2023    GLC 82 04/27/2023     (H) 04/09/2023     (A) 08/27/2019    BUN 30.3 (H) 04/27/2023    CR 3.74 (H) 04/27/2023    CR 1.11 08/27/2019    GFRESTIMATED 17 (L) 04/27/2023    GFRESTIMATED >60 08/27/2019    GFRESTBLACK >60 08/27/2019    NAZARIO 8.7 (L) 04/27/2023    BILITOTAL 0.9 04/09/2023    ALBUMIN 2.6 (L) 04/09/2023    ALKPHOS 139 (H) 04/09/2023    ALT 35 04/09/2023    AST 42 04/09/2023        INR RESULTS:  Lab Results   Component Value Date    INR 1.14 04/27/2023       Lab Results   Component Value Date    MAG 1.8 01/28/2023     No results found for: NTBNPI  No results found for: NTBNP    EKG 4/27/23: Atrial fibrillation HR 75        Coronary Angiogram 4/27/23:    67 y.o. male with PMH of ESLD s/p TIPS (10/22 here at [facility]) with recurrent HE, recurrent C diff s/p recent completion of prolonged po van taper, PAF on apixaban, ESRD on HD MWF, hereditary hemochromatosis who presents for coronary angiogram and possible PCI.     Pre Procedure Diagnosis    pre-operative evaluationother       Conclusion    Severe single vessel CAD with  of the RCA.         Plan      Follow bedrest per protocol    Continued medical management and lifestyle modifications for cardiovascular risk factor optimizations.    Discharge today per protocol        Recommend  initiation of high intensity statin if tolerated from hepatology team perspective and ASA 81mg daily     Recommendations    General Recommendations:  - Patient given specific instructions regarding care of arteriotomy site, activity restrictions, signs and symptoms of cardiac or vascular complications and to seek immediate medical evaluation should they occur.       Medications:  - Risk factor management for atherosclerosis.   - Recommend statin and anti-platelet therapy for hyperlipidemia and CAD.     Coronary Findings    Diagnostic  Dominance: Right  Left Main   The vessel is moderate in size and is angiographically normal.      Left Anterior Descending   The vessel is moderate in size.   Prox LAD lesion is 30% stenosed.      First Diagonal Branch   The vessel is moderate in size.   1st Diag lesion is 50% stenosed.      First Septal Branch   The vessel is small.      Second Diagonal Branch   The vessel is small.   2nd Diag lesion is 50% stenosed.      Second Septal Branch   The vessel is small.      Third Diagonal Branch   The vessel is small and is angiographically normal.      Left Circumflex   The vessel is moderate in size and is angiographically normal.      First Obtuse Marginal Branch   The vessel is large and is angiographically normal.      Lateral First Obtuse Marginal Branch   The vessel is moderate in size.      Second Obtuse Marginal Branch   The vessel is small and is angiographically normal.      Right Coronary Artery   The vessel is moderate in size.   Ost RCA to Mid RCA lesion is 50% stenosed.   Mid RCA lesion is 100% stenosed.   Mid RCA to Dist RCA lesion is 50% stenosed.      Right Posterior Descending Artery   The vessel is small.   Collaterals   RPDA filled by collaterals from Dist LAD.         Right Posterior Atrioventricular Artery   The vessel is small and is angiographically normal.   Collaterals   RPAV filled by collaterals from Dist Cx.         First Right Posterolateral Branch   The  vessel is small and is angiographically normal.      Second Right Posterolateral Branch   The vessel is small and is angiographically normal.         Intervention     No interventions have been documented.     Pressures Phase: Baseline     Time Systolic (mmHg) Diastolic (mmHg) Mean (mmHg) A Wave (mmHg) V Wave (mmHg) EDP (mmHg) Max dp/dt (mmHg/sec) HR (bpm) Content (mL/dL) SAT (%)   AO Pressures  3:22 PM 89    62    72        73                Assessment and Plan:   Mr. Quinones is a 67 year old male with medical history pertinent for alcoholic cirrosis, hepatic encephalopathy s/p TIPS, ESRD on iHD, CAD, DVT, and recent c.diff colitis.    # Severe 1v CAD w/  of RCA  # HLD  4/27/23 coronary angiogram showed mild to moderate CAD of LAD, 50% stenosis of ost RCA to mid RCA, and 100% stenosis of mid RCA.   11/2022 lipid panel elevated   - Patient needs to be on statin and asa 81, ok to defer until after surgery if necessary, reaching out to pt's hepatologist Dr. Bermudez for their opinion    # Intermittent dizziness  Seems to be partially related to position changes, but may also related to intravascular fluid shifting  - Start midodrine 2.5mg on dialysis days (MWF) 30min-1 hour before dialysis appt, okay to take another 2.5mg if necessary to raise BP for iHD run    # ESRD   - Cr 3.74 (4/27), past three months has been between 3.7-5.1  - Scheduled for iHD MWF - has not been able to get dialyzed recently due to mild hypotension  - Start midodrine 2.5mg on MWF  - Per pt, nephrologist is holding spironolactone until iHD BP improves    # DVT  # Paroxysmal atrial fibrillation   [id number]  4/24/23 LE venous duplex showed nonocclusive thrombus seen in the right popliteal vein, near-occlusive thrombus in the posterior tibial vein in the proximal calf.   - On warfarin (recently switched from Apixaban because of upcoming transplant listing status), INR managed by PCP in [place]  - Continue Toprol XL 25mg daily    # EtOH cirrhosis  #  Hepatic encephalopathy s/p TIPS  Undergoing kidney & liver transplant workup, cleared from cardiology perspective, likely to be officially listed this week    Follow up: as needed    Chart review time today: 10 minutes  Visit time today: 25 minutes  Total time spent today: 35 minutes      JADE HURLEY CNP  General Cardiology   05/10/23

## 2023-05-08 NOTE — TELEPHONE ENCOUNTER
Pre-Liver Transplant Evaluation/Teaching    TEACHING TOPICS: Evaluation Process, Evaluation Items, Diagnostic Studies, Consultation, Chemical Dependency Policy, CD Eval, Donor Source, Liver Allocation, MELD System, UNOS, Waiting List, Follow up while on transplant list, Follow up after transplantation, Infection and Rejection, Immunosuppression , Medication Teaching, Lab Recording after transplant, Laboratory Frequency after transplant , Consent for evaluation and One year survival rates    INSTRUCTIONAL MATERIAL USED/GIVEN:   Liver Transplant Handbook, MELD Booklet, Donor Booklet, Web Sites Options, Verbal instructions, Multiple Listing Brochure , Consent for evaluation signed, One year survival rates and SRTR (Scientific Registry) Data    Person(s) involved in teaching: patient, spouse Nabila  Asks Questions: YES  Eager to Learn: YES  Cooperative: YES  Receptive (willing/able to accept information): YES  Reason for the appointment, diagnosis and treatment plan: YES  Knowledge of proper use of medications and conditions for which they are ordered (with special attention to potential side effects or drug interactions): YES  Which situations necessitate calling provider and whom to contact: YES    Teaching Concerns Addressed   Comments:   Nutritional needs and diet plan: YES  How and/when to access community resources: YES  Patient is aware of and agrees to required commitment to post-op care and long term follow-up: YES    Patient open to all Extended Criteria organ offers (underutilized donors): Yes or no: Yes  Details:     Patient has name and phone number of transplant coordinator.   Time Spent face-to-face teachin minutes.

## 2023-05-09 ENCOUNTER — COMMITTEE REVIEW (OUTPATIENT)
Dept: TRANSPLANT | Facility: CLINIC | Age: 68
End: 2023-05-09

## 2023-05-09 ENCOUNTER — ANTICOAGULATION THERAPY VISIT (OUTPATIENT)
Dept: ANTICOAGULATION | Facility: CLINIC | Age: 68
End: 2023-05-09

## 2023-05-09 ENCOUNTER — TELEPHONE (OUTPATIENT)
Dept: TRANSPLANT | Facility: CLINIC | Age: 68
End: 2023-05-09

## 2023-05-09 ENCOUNTER — LAB (OUTPATIENT)
Dept: LAB | Facility: CLINIC | Age: 68
End: 2023-05-09
Payer: COMMERCIAL

## 2023-05-09 ENCOUNTER — DOCUMENTATION ONLY (OUTPATIENT)
Dept: NEPHROLOGY | Facility: CLINIC | Age: 68
End: 2023-05-09

## 2023-05-09 ENCOUNTER — HOSPITAL ENCOUNTER (OUTPATIENT)
Dept: PHYSICAL THERAPY | Facility: REHABILITATION | Age: 68
Discharge: HOME | End: 2023-05-09
Payer: COMMERCIAL

## 2023-05-09 DIAGNOSIS — K70.30 ALCOHOLIC CIRRHOSIS, UNSPECIFIED WHETHER ASCITES PRESENT (H): ICD-10-CM

## 2023-05-09 DIAGNOSIS — I48.20 CHRONIC ATRIAL FIBRILLATION (H): Primary | ICD-10-CM

## 2023-05-09 DIAGNOSIS — I48.20 CHRONIC ATRIAL FIBRILLATION (H): ICD-10-CM

## 2023-05-09 DIAGNOSIS — Z74.09 IMPAIRED FUNCTIONAL MOBILITY, BALANCE, GAIT, AND ENDURANCE: Primary | ICD-10-CM

## 2023-05-09 LAB — INR BLD: 1 (ref 0.9–1.1)

## 2023-05-09 PROCEDURE — 97110 THERAPEUTIC EXERCISES: CPT | Mod: GP

## 2023-05-09 PROCEDURE — 97750 PHYSICAL PERFORMANCE TEST: CPT | Mod: GP

## 2023-05-09 PROCEDURE — 36416 COLLJ CAPILLARY BLOOD SPEC: CPT

## 2023-05-09 PROCEDURE — 85610 PROTHROMBIN TIME: CPT | Mod: QW

## 2023-05-09 NOTE — PROGRESS NOTES
Transplant Nephrology Attestation    Patient's chart was reviewed and his case was discussed during our kidney transplant selection meeting previously.    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  The patient meets UNOS criteria for simultaneous liver/kidney transplant (criteria as noted below) and would be a kidney transplant candidate.  Patient is a good candidate overall.      UNOS Simultaneous Liver and Kidney Transplant Criteria    Transplant Nephrologist   Documentation required in the  confirms a diagnosis of:   candidates medical record:       Chronic kidney disease (CKD)  At least one of the following:  with a measured or calculated   glomerular filtration rate (GFR)  * That the candidate has begun regularly  less than or equal to 60 ml/min  administered dialysis as an end-stage renal  for greater than 90    disease (ESRD) patient in a hospital based,  consecutive days    independent non-hospital based, or home        setting.  * That the candidate's most recent measured or calculated creatinine clearance (CrCl) or glomerular filtration rate (GFR) is less than or equal to 35 ml/min at the time of registration on the kidney waiting list.    Sustained acute kidney injury   At least one of the followin. That the candidate has been on dialysis        for at least 6 consecutive weeks.        2. That the candidate has a measured or        calculated CrCl or GFR less than or        equal to 25 ml/min for at least 6        consecutive weeks and this is documented        in the candidate's medical record every 7        days beginning with the date of the first        test with this value.        3. The the candidate has any combination        of #1 and #2 above for six consecutive        weeks.    Metabolic Disease    An additional diagnosis of at least one of        the followin.  Hyperoxaluria        2. 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 Familial non-neuropathic systemic  Amyloid  4. Methylmalonic aciduria      Jeovany Scott MD  Transplant Nephrologist

## 2023-05-09 NOTE — TELEPHONE ENCOUNTER
Spoke to patient and wife and told them officially approved to list for [facility]- waiting for insurance approval, gets labs on Thursday with follow up with Dr. Bermudez, will plan to list as soon as PA from insurance approved.

## 2023-05-09 NOTE — PROGRESS NOTES
ANTICOAGULATION MANAGEMENT     Damion Quinones 67 year old male is on warfarin with subtherapeutic INR result. (Goal INR 2.0-3.0)    Recent labs: (last 7 days)     05/09/23  1027   INR 1.0       ASSESSMENT     Warfarin Lab Questionnaire    Warfarin Doses Last 7 Days      5/9/2023    10:23 AM   Dose in Tablet or Mg   TAB or MG? milligram (mg)     Pt Rptd Dose SUNDAY MONDAY FRIDAY SATURDAY 5/9/2023  10:23 AM 1 1 1 1         5/9/2023   Warfarin Lab Questionnaire   Missed doses within past 14 days? No   Changes in diet or alcohol within past 14 days? No   Medication changes since last result? No   Injuries or illness since last result? No   New shortness of breath, severe headaches or sudden changes in vision since last result? No   Abnormal bleeding since last result? No   Upcoming surgery, procedure? No   Best number to call with results? [phone]        Previous result: n/a-- first INR after starting warfarin  Additional findings: New start on day 5 of warfarin       PLAN     Recommended plan for no diet, medication or health factor changes affecting INR     Dosing Instructions: decrease your warfarin dose (28.6% change) with next INR on Friday       Summary  As of 5/9/2023    Full warfarin instructions:  2 mg every Tue, Sat; 1 mg all other days   Next INR check:  5/12/2023             Telephone call with  wife, Apoorva who agrees to plan and repeated back plan correctly    Lab visit scheduled    Education provided:     Symptom monitoring: monitoring for clotting signs and symptoms and when to seek medical attention/emergency care    Contact [phone] with any changes, questions or concerns.     Plan made per [facility] anticoagulation protocol    Koki Maharaj, RN  Anticoagulation Clinic  5/9/2023    _______________________________________________________________________     Anticoagulation Episode Summary     Current INR goal:  2.0-3.0   TTR:  --   Target end date:  5/3/2024   Send INR reminders to:  [facility]    Indications    Chronic atrial fibrillation (H) [I48.20]           Comments:           Anticoagulation Care Providers     Provider Role Specialty Phone number    Gary Serrano MD Referring Family Medicine [phone]

## 2023-05-10 ENCOUNTER — OFFICE VISIT (OUTPATIENT)
Dept: CARDIOLOGY | Facility: CLINIC | Age: 68
End: 2023-05-10
Attending: CASE MANAGER/CARE COORDINATOR
Payer: COMMERCIAL

## 2023-05-10 VITALS
DIASTOLIC BLOOD PRESSURE: 81 MMHG | BODY MASS INDEX: 35.82 KG/M2 | WEIGHT: 235.6 LBS | SYSTOLIC BLOOD PRESSURE: 123 MMHG | OXYGEN SATURATION: 100 % | HEART RATE: 88 BPM

## 2023-05-10 DIAGNOSIS — Z99.2 ESRD (END STAGE RENAL DISEASE) ON DIALYSIS (H): ICD-10-CM

## 2023-05-10 DIAGNOSIS — I25.10 CORONARY ARTERY DISEASE INVOLVING NATIVE CORONARY ARTERY OF NATIVE HEART WITHOUT ANGINA PECTORIS: Primary | ICD-10-CM

## 2023-05-10 DIAGNOSIS — I95.1 ORTHOSTATIC HYPOTENSION: ICD-10-CM

## 2023-05-10 DIAGNOSIS — N18.6 ESRD (END STAGE RENAL DISEASE) ON DIALYSIS (H): ICD-10-CM

## 2023-05-10 PROCEDURE — 93010 ELECTROCARDIOGRAM REPORT: CPT | Performed by: INTERNAL MEDICINE

## 2023-05-10 PROCEDURE — 99214 OFFICE O/P EST MOD 30 MIN: CPT | Performed by: CASE MANAGER/CARE COORDINATOR

## 2023-05-10 PROCEDURE — 99212 OFFICE O/P EST SF 10 MIN: CPT | Performed by: CASE MANAGER/CARE COORDINATOR

## 2023-05-10 RX ORDER — MIDODRINE HYDROCHLORIDE 2.5 MG/1
2.5 TABLET ORAL 3 TIMES DAILY
Qty: 45 TABLET | Refills: 3 | Status: SHIPPED | OUTPATIENT
Start: 2023-05-10 | End: 2023-05-10

## 2023-05-10 RX ORDER — MIDODRINE HYDROCHLORIDE 2.5 MG/1
2.5 TABLET ORAL DAILY
Qty: 45 TABLET | Refills: 3 | Status: ON HOLD | OUTPATIENT
Start: 2023-05-10 | End: 2023-06-25

## 2023-05-10 ASSESSMENT — PAIN SCALES - GENERAL: PAINLEVEL: NO PAIN (0)

## 2023-05-10 NOTE — LETTER
5/10/2023      RE: Damion Quinones  [address] [facility] [address]       Dear Colleague,    Thank you for the opportunity to participate in the care of your patient, Damion Quinones, at the [facility] [place] [facility]. Please see a copy of my visit note below.      [facility] Cardiology - [facility]   Cardiology Clinic Note      HPI:   Mr. Quinones is a 67 year old male with medical history pertinent for alcoholic cirrosis, hepatic encephalopathy s/p TIPS, CKD, DVT, and recent c.diff colitis. His last cardiology clinic visit was 12/2022 for pre-transplant workup. He presents to clinic today with his wife for coronary angiogram follow up.    Casey notes no new symptoms post angiogram. He and his wife report that he has been experiencing episodes of intermittent dizziness in the past few months (10-12 episodes/1 month). The dizziness is often correlated with position changes. He has not fainted. He has been slower in his positional changes and has started using his walker for extra support.     His BP has been normal at home and in clinic, but when he goes to iHD it is often low enough that they won't pull fluid off (SBPs 80-100s). This has resulted in him feeling very swollen in his arms, and developing weeping blisters.    PAST MEDICAL HISTORY:  Past Medical History:   Diagnosis Date    Alcoholic cirrhosis of liver with ascites (H) 10/11/2019    Arthritis     RA    C. difficile colitis     Chronic kidney disease     Coronary artery disease     AFib 2016    Diabetes mellitus type 2, uncomplicated (H) 10/11/2019    History of blood transfusion     2016    History of hemochromatosis 10/11/2019    Hypertension     Hypokalemia     Obesity     PAF (paroxysmal atrial fibrillation) (H)     Psoriatic arthritis (H)        FAMILY HISTORY:  Family History   Problem Relation Age of Onset    Lung Cancer Mother     Colon Cancer Father     Lung Cancer Brother     Heart  Failure Brother     Kidney Disease Brother        SOCIAL HISTORY:  Social History     Socioeconomic History    Marital status:    Tobacco Use    Smoking status: Never     Passive exposure: Never    Smokeless tobacco: Never   Vaping Use    Vaping status: Never Used   Substance and Sexual Activity    Alcohol use: Not Currently     Comment: Last ETOH use was 12/31/2021    Drug use: Not Currently       CURRENT MEDICATIONS:  bumetanide (BUMEX) 1 MG tablet, Take 2 tablets (2 mg) by mouth 2 times daily for 180 days  Calcium Carbonate-Vitamin D 500-3.125 MG-MCG TABS, Take 1 tablet by mouth 2 times daily  folic acid (FOLVITE) 1 MG tablet, TAKE FIVE TABLETS BY MOUTH EVERY DAY  lactulose (CEPHULAC) 10 GM packet, Take 3 packets (30 g) by mouth daily  metoprolol succinate ER (TOPROL XL) 25 MG 24 hr tablet, Take 1 tablet by mouth daily at 2 pm  omeprazole 20 MG tablet, Take 2 tablets (40 mg) by mouth 2 times daily  predniSONE (DELTASONE) 5 MG tablet, Take 10 mg by mouth daily  spironolactone (ALDACTONE) 25 MG tablet, Take 25 mg by mouth daily Start when OK from nephrology  XIFAXAN 550 MG TABS tablet, Take 550 mg by mouth 2 times daily  zinc oxide (DESITIN) 40 % paste, Apply topically as needed for irritation apply to buttock to prevent bleeding with wiping  zinc sulfate (ZINCATE) 220 (50 Zn) MG capsule, Take 1 capsule (220 mg) by mouth daily (Patient not taking: Reported on 5/1/2023)    No current facility-administered medications on file prior to visit.      ROS:   Refer to HPI    EXAM:  /81 (BP Location: Left arm, Patient Position: Sitting, Cuff Size: Adult Large)   Pulse 88   Wt 106.9 kg (235 lb 9.6 oz)   SpO2 100%   BMI 35.82 kg/m    GENERAL: Appears comfortable, in no acute distress.   HEENT: Eye symmetrical, no discharge or icterus bilaterally. Mucous membranes moist and without lesions.  CV: Irregularly irregular, +S1S2, no murmur, rub, or gallop. JVP difficult to appreciate .   RESPIRATORY: Respirations  regular, even, and unlabored. Lungs CTA throughout.   GI: Soft, mildly distended with normoactive bowel sounds present in all quadrants. No tenderness, rebound, guarding.  EXTREMITIES: 4+ upper extremity edema with pitting, +2 LE peripheral edema. 2+ bilateral pedal pulses.   NEUROLOGIC: Alert and oriented x 3. No focal deficits.   MUSCULOSKELETAL: No joint swelling or tenderness.   SKIN: Multiple upper arm blisters, upper arms mottled. No jaundice.      Labs, reviewed with patient in clinic today:  CBC RESULTS:  Lab Results   Component Value Date    WBC 11.4 (H) 04/27/2023    RBC 3.92 (L) 04/27/2023    HGB 11.8 (L) 04/27/2023    HCT 37.4 (L) 04/27/2023    MCV 95 04/27/2023    MCH 30.1 04/27/2023    MCHC 31.6 04/27/2023    RDW 18.0 (H) 04/27/2023     04/27/2023       CMP RESULTS:  Lab Results   Component Value Date     04/27/2023    POTASSIUM 3.2 (L) 04/27/2023    POTASSIUM 4.6 08/27/2019    CHLORIDE 106 04/27/2023    CO2 25 04/27/2023    ANIONGAP 11 04/27/2023    GLC 82 04/27/2023     (H) 04/09/2023     (A) 08/27/2019    BUN 30.3 (H) 04/27/2023    CR 3.74 (H) 04/27/2023    CR 1.11 08/27/2019    GFRESTIMATED 17 (L) 04/27/2023    GFRESTIMATED >60 08/27/2019    GFRESTBLACK >60 08/27/2019    NAZARIO 8.7 (L) 04/27/2023    BILITOTAL 0.9 04/09/2023    ALBUMIN 2.6 (L) 04/09/2023    ALKPHOS 139 (H) 04/09/2023    ALT 35 04/09/2023    AST 42 04/09/2023        INR RESULTS:  Lab Results   Component Value Date    INR 1.14 04/27/2023       Lab Results   Component Value Date    MAG 1.8 01/28/2023     No results found for: NTBNPI  No results found for: NTBNP    EKG 4/27/23: Atrial fibrillation HR 75        Coronary Angiogram 4/27/23:    67 y.o. male with PMH of ESLD s/p TIPS (10/22 here at [facility]) with recurrent HE, recurrent C diff s/p recent completion of prolonged po van taper, PAF on apixaban, ESRD on HD MWF, hereditary hemochromatosis who presents for coronary angiogram and possible PCI.     Pre  Procedure Diagnosis    pre-operative evaluationother       Conclusion    Severe single vessel CAD with  of the RCA.         Plan     Follow bedrest per protocol   Continued medical management and lifestyle modifications for cardiovascular risk factor optimizations.   Discharge today per protocol        Recommend initiation of high intensity statin if tolerated from hepatology team perspective and ASA 81mg daily     Recommendations    General Recommendations:  - Patient given specific instructions regarding care of arteriotomy site, activity restrictions, signs and symptoms of cardiac or vascular complications and to seek immediate medical evaluation should they occur.       Medications:  - Risk factor management for atherosclerosis.   - Recommend statin and anti-platelet therapy for hyperlipidemia and CAD.     Coronary Findings    Diagnostic  Dominance: Right  Left Main   The vessel is moderate in size and is angiographically normal.      Left Anterior Descending   The vessel is moderate in size.   Prox LAD lesion is 30% stenosed.      First Diagonal Branch   The vessel is moderate in size.   1st Diag lesion is 50% stenosed.      First Septal Branch   The vessel is small.      Second Diagonal Branch   The vessel is small.   2nd Diag lesion is 50% stenosed.      Second Septal Branch   The vessel is small.      Third Diagonal Branch   The vessel is small and is angiographically normal.      Left Circumflex   The vessel is moderate in size and is angiographically normal.      First Obtuse Marginal Branch   The vessel is large and is angiographically normal.      Lateral First Obtuse Marginal Branch   The vessel is moderate in size.      Second Obtuse Marginal Branch   The vessel is small and is angiographically normal.      Right Coronary Artery   The vessel is moderate in size.   Ost RCA to Mid RCA lesion is 50% stenosed.   Mid RCA lesion is 100% stenosed.   Mid RCA to Dist RCA lesion is 50% stenosed.      Right  Posterior Descending Artery   The vessel is small.   Collaterals   RPDA filled by collaterals from Dist LAD.         Right Posterior Atrioventricular Artery   The vessel is small and is angiographically normal.   Collaterals   RPAV filled by collaterals from Dist Cx.         First Right Posterolateral Branch   The vessel is small and is angiographically normal.      Second Right Posterolateral Branch   The vessel is small and is angiographically normal.         Intervention     No interventions have been documented.     Pressures Phase: Baseline     Time Systolic (mmHg) Diastolic (mmHg) Mean (mmHg) A Wave (mmHg) V Wave (mmHg) EDP (mmHg) Max dp/dt (mmHg/sec) HR (bpm) Content (mL/dL) SAT (%)   AO Pressures  3:22 PM 89    62    72        73                Assessment and Plan:   Mr. Quinones is a 67 year old male with medical history pertinent for alcoholic cirrosis, hepatic encephalopathy s/p TIPS, ESRD on iHD, CAD, DVT, and recent c.diff colitis.    # Severe 1v CAD w/  of RCA  # HLD  4/27/23 coronary angiogram showed mild to moderate CAD of LAD, 50% stenosis of ost RCA to mid RCA, and 100% stenosis of mid RCA.   11/2022 lipid panel elevated   - Patient needs to be on statin and asa 81, ok to defer until after surgery if necessary, reaching out to pt's hepatologist Dr. Bermudez for their opinion    # Intermittent dizziness  Seems to be partially related to position changes, but may also related to intravascular fluid shifting  - Start midodrine 2.5mg on dialysis days (MWF) 30min-1 hour before dialysis appt, okay to take another 2.5mg if necessary to raise BP for iHD run    # ESRD   - Cr 3.74 (4/27), past three months has been between 3.7-5.1  - Scheduled for iHD MWF - has not been able to get dialyzed recently due to mild hypotension  - Start midodrine 2.5mg on MWF  - Per pt, nephrologist is holding spironolactone until iHD BP improves    # DVT  # Paroxysmal atrial fibrillation   [id number]  4/24/23 LE venous duplex showed  nonocclusive thrombus seen in the right popliteal vein, near-occlusive thrombus in the posterior tibial vein in the proximal calf.   - On warfarin (recently switched from Apixaban because of upcoming transplant listing status), INR managed by PCP in [place]  - Continue Toprol XL 25mg daily    # EtOH cirrhosis  # Hepatic encephalopathy s/p TIPS  Undergoing kidney & liver transplant workup, cleared from cardiology perspective, likely to be officially listed this week    Follow up: as needed    Chart review time today: 10 minutes  Visit time today: 25 minutes  Total time spent today: 35 minutes      JADE HURLEY CNP  General Cardiology   05/10/23

## 2023-05-10 NOTE — NURSING NOTE
Chief Complaint   Patient presents with     Follow Up      follow up angiogram, liver/kidney transplant candidate          Vitals were taken and medications reconciled.     Manoj Zelaya, Visit Facilitator    12:12 PM

## 2023-05-10 NOTE — PATIENT INSTRUCTIONS
Patient Instructions:  It was a pleasure to see you in the cardiology clinic today.      If you have any questions, call  Faiza Vera RN, at [phone].   [facility] Cardiology Clinics.  To schedule an appointment or to leave a message for your Care Team Press #1  If you are a physician calling for another physician Press #2  For Billing Press #3  For Medical Records Press #4  We are encouraging the use of [employer] to communicate with your HealthCare Provider    Note the new medications: midodrine 2.5 mg by mouth on dialysis days, take 2.5 mg up to 3 times for low BP  Stop the following medications: none    The results from today include: none  Please follow up with solid organ transplant      If you have an urgent need after hours (8:00 am to 4:30 pm) please call [phone] and ask for the cardiology fellow on call.

## 2023-05-11 ENCOUNTER — APPOINTMENT (OUTPATIENT)
Dept: CT IMAGING | Facility: CLINIC | Age: 68
End: 2023-05-11
Attending: EMERGENCY MEDICINE
Payer: COMMERCIAL

## 2023-05-11 ENCOUNTER — APPOINTMENT (OUTPATIENT)
Dept: GENERAL RADIOLOGY | Facility: CLINIC | Age: 68
End: 2023-05-11
Attending: EMERGENCY MEDICINE
Payer: COMMERCIAL

## 2023-05-11 ENCOUNTER — ANTICOAGULATION THERAPY VISIT (OUTPATIENT)
Dept: ANTICOAGULATION | Facility: CLINIC | Age: 68
End: 2023-05-11

## 2023-05-11 ENCOUNTER — HOSPITAL ENCOUNTER (EMERGENCY)
Facility: CLINIC | Age: 68
Discharge: HOME | End: 2023-05-11
Attending: EMERGENCY MEDICINE | Admitting: EMERGENCY MEDICINE
Payer: COMMERCIAL

## 2023-05-11 ENCOUNTER — OFFICE VISIT (OUTPATIENT)
Dept: GASTROENTEROLOGY | Facility: CLINIC | Age: 68
End: 2023-05-11
Attending: INTERNAL MEDICINE
Payer: COMMERCIAL

## 2023-05-11 ENCOUNTER — LAB (OUTPATIENT)
Dept: LAB | Facility: CLINIC | Age: 68
End: 2023-05-11
Attending: INTERNAL MEDICINE
Payer: COMMERCIAL

## 2023-05-11 ENCOUNTER — DOCUMENTATION ONLY (OUTPATIENT)
Dept: TRANSPLANT | Facility: CLINIC | Age: 68
End: 2023-05-11

## 2023-05-11 VITALS
WEIGHT: 220 LBS | TEMPERATURE: 98 F | DIASTOLIC BLOOD PRESSURE: 96 MMHG | RESPIRATION RATE: 18 BRPM | HEIGHT: 68 IN | OXYGEN SATURATION: 99 % | HEART RATE: 77 BPM | SYSTOLIC BLOOD PRESSURE: 128 MMHG | BODY MASS INDEX: 33.34 KG/M2

## 2023-05-11 VITALS — OXYGEN SATURATION: 98 % | HEART RATE: 98 BPM | SYSTOLIC BLOOD PRESSURE: 112 MMHG | DIASTOLIC BLOOD PRESSURE: 76 MMHG

## 2023-05-11 DIAGNOSIS — E83.110 HEREDITARY HEMOCHROMATOSIS (H): ICD-10-CM

## 2023-05-11 DIAGNOSIS — Z99.2 ESRD (END STAGE RENAL DISEASE) ON DIALYSIS (H): ICD-10-CM

## 2023-05-11 DIAGNOSIS — R07.89 CHEST WALL PAIN: ICD-10-CM

## 2023-05-11 DIAGNOSIS — G93.40 ENCEPHALOPATHY: ICD-10-CM

## 2023-05-11 DIAGNOSIS — K70.30 ALCOHOLIC CIRRHOSIS (H): Primary | ICD-10-CM

## 2023-05-11 DIAGNOSIS — K70.31 ALCOHOLIC CIRRHOSIS OF LIVER WITH ASCITES (H): ICD-10-CM

## 2023-05-11 DIAGNOSIS — K70.30 ALCOHOLIC CIRRHOSIS OF LIVER WITHOUT ASCITES (H): Primary | ICD-10-CM

## 2023-05-11 DIAGNOSIS — R53.81 PHYSICAL DECONDITIONING: ICD-10-CM

## 2023-05-11 DIAGNOSIS — K70.30 ALCOHOLIC CIRRHOSIS (H): ICD-10-CM

## 2023-05-11 DIAGNOSIS — E44.0 MODERATE PROTEIN-CALORIE MALNUTRITION (H): ICD-10-CM

## 2023-05-11 DIAGNOSIS — N18.5 CHRONIC KIDNEY DISEASE, STAGE 5, KIDNEY FAILURE (H): ICD-10-CM

## 2023-05-11 DIAGNOSIS — N18.6 ESRD (END STAGE RENAL DISEASE) ON DIALYSIS (H): ICD-10-CM

## 2023-05-11 DIAGNOSIS — Z95.828 S/P TIPS (TRANSJUGULAR INTRAHEPATIC PORTOSYSTEMIC SHUNT): ICD-10-CM

## 2023-05-11 DIAGNOSIS — I82.90 DEEP VEIN THROMBOSIS (DVT) OF NON-EXTREMITY VEIN, UNSPECIFIED CHRONICITY: ICD-10-CM

## 2023-05-11 DIAGNOSIS — I25.10 CORONARY ARTERY DISEASE INVOLVING NATIVE CORONARY ARTERY WITHOUT ANGINA PECTORIS, UNSPECIFIED WHETHER NATIVE OR TRANSPLANTED HEART: Primary | ICD-10-CM

## 2023-05-11 DIAGNOSIS — I48.20 CHRONIC ATRIAL FIBRILLATION (H): Primary | ICD-10-CM

## 2023-05-11 LAB
AFP SERPL-MCNC: 9.3 NG/ML
ALBUMIN SERPL BCG-MCNC: 2.7 G/DL (ref 3.5–5.2)
ALBUMIN SERPL BCG-MCNC: 2.7 G/DL (ref 3.5–5.2)
ALP SERPL-CCNC: 207 U/L (ref 40–129)
ALP SERPL-CCNC: 218 U/L (ref 40–129)
ALT SERPL W P-5'-P-CCNC: 27 U/L (ref 10–50)
ALT SERPL W P-5'-P-CCNC: 28 U/L (ref 10–50)
ANION GAP SERPL CALCULATED.3IONS-SCNC: 12 MMOL/L (ref 7–15)
ANION GAP SERPL CALCULATED.3IONS-SCNC: 12 MMOL/L (ref 7–15)
AST SERPL W P-5'-P-CCNC: 42 U/L (ref 10–50)
AST SERPL W P-5'-P-CCNC: 45 U/L (ref 10–50)
ATRIAL RATE - MUSE: 468 BPM
ATRIAL RATE - MUSE: 88 BPM
BASOPHILS # BLD AUTO: 0.1 10E3/UL (ref 0–0.2)
BASOPHILS NFR BLD AUTO: 1 %
BILIRUB DIRECT SERPL-MCNC: 0.32 MG/DL (ref 0–0.3)
BILIRUB SERPL-MCNC: 0.7 MG/DL
BILIRUB SERPL-MCNC: 0.8 MG/DL
BUN SERPL-MCNC: 46.4 MG/DL (ref 8–23)
BUN SERPL-MCNC: 47.5 MG/DL (ref 8–23)
CA-I BLD-MCNC: 4.8 MG/DL (ref 4.4–5.2)
CALCIUM SERPL-MCNC: 8.7 MG/DL (ref 8.8–10.2)
CALCIUM SERPL-MCNC: 8.8 MG/DL (ref 8.8–10.2)
CHLORIDE SERPL-SCNC: 102 MMOL/L (ref 98–107)
CHLORIDE SERPL-SCNC: 103 MMOL/L (ref 98–107)
CPB POCT: NO
CREAT SERPL-MCNC: 4.19 MG/DL (ref 0.67–1.17)
CREAT SERPL-MCNC: 4.2 MG/DL (ref 0.67–1.17)
DEPRECATED HCO3 PLAS-SCNC: 25 MMOL/L (ref 22–29)
DEPRECATED HCO3 PLAS-SCNC: 25 MMOL/L (ref 22–29)
DIASTOLIC BLOOD PRESSURE - MUSE: NORMAL MMHG
DIASTOLIC BLOOD PRESSURE - MUSE: NORMAL MMHG
EOSINOPHIL # BLD AUTO: 0.2 10E3/UL (ref 0–0.7)
EOSINOPHIL NFR BLD AUTO: 2 %
ERYTHROCYTE [DISTWIDTH] IN BLOOD BY AUTOMATED COUNT: 17.7 % (ref 10–15)
ERYTHROCYTE [DISTWIDTH] IN BLOOD BY AUTOMATED COUNT: 17.8 % (ref 10–15)
GFR SERPL CREATININE-BSD FRML MDRD: 15 ML/MIN/1.73M2
GFR SERPL CREATININE-BSD FRML MDRD: 15 ML/MIN/1.73M2
GLUCOSE BLD-MCNC: 74 MG/DL (ref 70–99)
GLUCOSE SERPL-MCNC: 75 MG/DL (ref 70–99)
GLUCOSE SERPL-MCNC: 82 MG/DL (ref 70–99)
HCO3 BLDV-SCNC: 27 MMOL/L (ref 21–28)
HCT VFR BLD AUTO: 32.6 % (ref 40–53)
HCT VFR BLD AUTO: 33.7 % (ref 40–53)
HCT VFR BLD CALC: 30 % (ref 40–53)
HGB BLD-MCNC: 10.2 G/DL (ref 13.3–17.7)
HGB BLD-MCNC: 10.3 G/DL (ref 13.3–17.7)
HGB BLD-MCNC: 11.1 G/DL (ref 13.3–17.7)
IMM GRANULOCYTES # BLD: 0.4 10E3/UL
IMM GRANULOCYTES NFR BLD: 4 %
INR PPP: 1.09 (ref 0.85–1.15)
INR PPP: 1.16 (ref 0.85–1.15)
INTERPRETATION ECG - MUSE: NORMAL
INTERPRETATION ECG - MUSE: NORMAL
LYMPHOCYTES # BLD AUTO: 1.6 10E3/UL (ref 0.8–5.3)
LYMPHOCYTES NFR BLD AUTO: 17 %
MAGNESIUM SERPL-MCNC: 1.9 MG/DL (ref 1.7–2.3)
MCH RBC QN AUTO: 30.2 PG (ref 26.5–33)
MCH RBC QN AUTO: 30.9 PG (ref 26.5–33)
MCHC RBC AUTO-ENTMCNC: 31.6 G/DL (ref 31.5–36.5)
MCHC RBC AUTO-ENTMCNC: 32.9 G/DL (ref 31.5–36.5)
MCV RBC AUTO: 94 FL (ref 78–100)
MCV RBC AUTO: 96 FL (ref 78–100)
MONOCYTES # BLD AUTO: 0.9 10E3/UL (ref 0–1.3)
MONOCYTES NFR BLD AUTO: 10 %
NEUTROPHILS # BLD AUTO: 6.2 10E3/UL (ref 1.6–8.3)
NEUTROPHILS NFR BLD AUTO: 66 %
NRBC # BLD AUTO: 0 10E3/UL
NRBC BLD AUTO-RTO: 0 /100
P AXIS - MUSE: NORMAL DEGREES
P AXIS - MUSE: NORMAL DEGREES
PCO2 BLDV: 41 MM HG (ref 40–50)
PH BLDV: 7.43 [PH] (ref 7.32–7.43)
PLATELET # BLD AUTO: 129 10E3/UL (ref 150–450)
PLATELET # BLD AUTO: 135 10E3/UL (ref 150–450)
PO2 BLDV: 38 MM HG (ref 25–47)
POTASSIUM BLD-SCNC: 3.3 MMOL/L (ref 3.4–5.3)
POTASSIUM SERPL-SCNC: 3.3 MMOL/L (ref 3.4–5.3)
POTASSIUM SERPL-SCNC: 3.4 MMOL/L (ref 3.4–5.3)
PR INTERVAL - MUSE: NORMAL MS
PR INTERVAL - MUSE: NORMAL MS
PROT SERPL-MCNC: 4.6 G/DL (ref 6.4–8.3)
PROT SERPL-MCNC: 4.9 G/DL (ref 6.4–8.3)
QRS DURATION - MUSE: 74 MS
QRS DURATION - MUSE: 78 MS
QT - MUSE: 378 MS
QT - MUSE: 388 MS
QTC - MUSE: 433 MS
QTC - MUSE: 441 MS
R AXIS - MUSE: 46 DEGREES
R AXIS - MUSE: 64 DEGREES
RBC # BLD AUTO: 3.41 10E6/UL (ref 4.4–5.9)
RBC # BLD AUTO: 3.59 10E6/UL (ref 4.4–5.9)
SAO2 % BLDV: 73 % (ref 94–100)
SODIUM BLD-SCNC: 139 MMOL/L (ref 133–144)
SODIUM SERPL-SCNC: 139 MMOL/L (ref 136–145)
SODIUM SERPL-SCNC: 140 MMOL/L (ref 136–145)
SYSTOLIC BLOOD PRESSURE - MUSE: NORMAL MMHG
SYSTOLIC BLOOD PRESSURE - MUSE: NORMAL MMHG
T AXIS - MUSE: -30 DEGREES
T AXIS - MUSE: -41 DEGREES
TROPONIN T SERPL HS-MCNC: 172 NG/L
TROPONIN T SERPL HS-MCNC: 199 NG/L
VENTRICULAR RATE- MUSE: 75 BPM
VENTRICULAR RATE- MUSE: 82 BPM
WBC # BLD AUTO: 8.5 10E3/UL (ref 4–11)
WBC # BLD AUTO: 9.3 10E3/UL (ref 4–11)

## 2023-05-11 PROCEDURE — 80321 ALCOHOLS BIOMARKERS 1OR 2: CPT | Mod: 90 | Performed by: PATHOLOGY

## 2023-05-11 PROCEDURE — 82947 ASSAY GLUCOSE BLOOD QUANT: CPT

## 2023-05-11 PROCEDURE — 36415 COLL VENOUS BLD VENIPUNCTURE: CPT | Performed by: PATHOLOGY

## 2023-05-11 PROCEDURE — 84460 ALANINE AMINO (ALT) (SGPT): CPT | Performed by: PATHOLOGY

## 2023-05-11 PROCEDURE — 85027 COMPLETE CBC AUTOMATED: CPT | Performed by: PATHOLOGY

## 2023-05-11 PROCEDURE — 82105 ALPHA-FETOPROTEIN SERUM: CPT | Performed by: PATHOLOGY

## 2023-05-11 PROCEDURE — 71046 X-RAY EXAM CHEST 2 VIEWS: CPT

## 2023-05-11 PROCEDURE — 85610 PROTHROMBIN TIME: CPT | Performed by: PATHOLOGY

## 2023-05-11 PROCEDURE — 82947 ASSAY GLUCOSE BLOOD QUANT: CPT | Performed by: PATHOLOGY

## 2023-05-11 PROCEDURE — 71275 CT ANGIOGRAPHY CHEST: CPT | Mod: 26 | Performed by: RADIOLOGY

## 2023-05-11 PROCEDURE — 85610 PROTHROMBIN TIME: CPT | Performed by: EMERGENCY MEDICINE

## 2023-05-11 PROCEDURE — 250N000013 HC RX MED GY IP 250 OP 250 PS 637: Performed by: EMERGENCY MEDICINE

## 2023-05-11 PROCEDURE — 99213 OFFICE O/P EST LOW 20 MIN: CPT | Performed by: INTERNAL MEDICINE

## 2023-05-11 PROCEDURE — 82247 BILIRUBIN TOTAL: CPT | Performed by: PATHOLOGY

## 2023-05-11 PROCEDURE — 82947 ASSAY GLUCOSE BLOOD QUANT: CPT | Performed by: EMERGENCY MEDICINE

## 2023-05-11 PROCEDURE — 999N000248 HC STATISTIC IV INSERT WITH US BY RN

## 2023-05-11 PROCEDURE — 71275 CT ANGIOGRAPHY CHEST: CPT

## 2023-05-11 PROCEDURE — 84155 ASSAY OF PROTEIN SERUM: CPT | Performed by: PATHOLOGY

## 2023-05-11 PROCEDURE — 93005 ELECTROCARDIOGRAM TRACING: CPT | Performed by: EMERGENCY MEDICINE

## 2023-05-11 PROCEDURE — 84450 TRANSFERASE (AST) (SGOT): CPT | Performed by: PATHOLOGY

## 2023-05-11 PROCEDURE — 71046 X-RAY EXAM CHEST 2 VIEWS: CPT | Mod: 26 | Performed by: RADIOLOGY

## 2023-05-11 PROCEDURE — 99000 SPECIMEN HANDLING OFFICE-LAB: CPT | Performed by: PATHOLOGY

## 2023-05-11 PROCEDURE — 80053 COMPREHEN METABOLIC PANEL: CPT

## 2023-05-11 PROCEDURE — 84484 ASSAY OF TROPONIN QUANT: CPT | Mod: 91 | Performed by: EMERGENCY MEDICINE

## 2023-05-11 PROCEDURE — 93010 ELECTROCARDIOGRAM REPORT: CPT | Performed by: EMERGENCY MEDICINE

## 2023-05-11 PROCEDURE — 84075 ASSAY ALKALINE PHOSPHATASE: CPT | Performed by: PATHOLOGY

## 2023-05-11 PROCEDURE — 82248 BILIRUBIN DIRECT: CPT | Performed by: PATHOLOGY

## 2023-05-11 PROCEDURE — 250N000011 HC RX IP 250 OP 636: Performed by: EMERGENCY MEDICINE

## 2023-05-11 PROCEDURE — 36415 COLL VENOUS BLD VENIPUNCTURE: CPT | Performed by: EMERGENCY MEDICINE

## 2023-05-11 PROCEDURE — 85025 COMPLETE CBC W/AUTO DIFF WBC: CPT | Performed by: EMERGENCY MEDICINE

## 2023-05-11 PROCEDURE — 99285 EMERGENCY DEPT VISIT HI MDM: CPT | Mod: 25 | Performed by: EMERGENCY MEDICINE

## 2023-05-11 PROCEDURE — 82330 ASSAY OF CALCIUM: CPT

## 2023-05-11 PROCEDURE — 99215 OFFICE O/P EST HI 40 MIN: CPT | Mod: GC | Performed by: INTERNAL MEDICINE

## 2023-05-11 PROCEDURE — 83735 ASSAY OF MAGNESIUM: CPT | Performed by: EMERGENCY MEDICINE

## 2023-05-11 RX ORDER — ASPIRIN 81 MG/1
81 TABLET ORAL DAILY
Qty: 90 TABLET | Refills: 3 | Status: ON HOLD | OUTPATIENT
Start: 2023-05-11 | End: 2023-06-25

## 2023-05-11 RX ORDER — ASPIRIN 81 MG/1
324 TABLET, CHEWABLE ORAL ONCE
Status: COMPLETED | OUTPATIENT
Start: 2023-05-11 | End: 2023-05-11

## 2023-05-11 RX ORDER — WARFARIN SODIUM 1 MG/1
1 TABLET ORAL DAILY
COMMUNITY
Start: 2023-02-23 | End: 2023-05-11

## 2023-05-11 RX ORDER — WARFARIN SODIUM 1 MG/1
TABLET ORAL
Qty: 60 TABLET | Refills: 0 | Status: SHIPPED | OUTPATIENT
Start: 2023-05-11 | End: 2023-05-31

## 2023-05-11 RX ORDER — IOPAMIDOL 755 MG/ML
135 INJECTION, SOLUTION INTRAVASCULAR ONCE
Status: COMPLETED | OUTPATIENT
Start: 2023-05-11 | End: 2023-05-11

## 2023-05-11 RX ADMIN — IOPAMIDOL 77 ML: 755 INJECTION, SOLUTION INTRAVENOUS at 07:25

## 2023-05-11 RX ADMIN — ASPIRIN 81 MG CHEWABLE TABLET 324 MG: 81 TABLET CHEWABLE at 07:39

## 2023-05-11 ASSESSMENT — PAIN SCALES - GENERAL: PAINLEVEL: NO PAIN (0)

## 2023-05-11 ASSESSMENT — ACTIVITIES OF DAILY LIVING (ADL)
ADLS_ACUITY_SCORE: 35
ADLS_ACUITY_SCORE: 35

## 2023-05-11 NOTE — ED NOTES
Sign out from Dr. Avalos at 7AM    Situation:  Atypical chest pain in a pt with a hx of ESRD on HD and CAD with rather recent cath.   Initial EKG unchanged. Troponin stably elevated, likely 2/2 to ESRD. Discussed with Cards.     Plan: Will get 2nd troponin and if stable or down trending, OK for outpatient follow up.   Also has CT PE pending.     Events:   8:38 AM  2nd troponin slightly decreased.   CT negative for PE, pneumothorax or other abnormalities to explain his pain.     Discharged.   Has HD tomorrow and appointment this afternoon for HD catheter exchange.     MD David Grigsby, Rhonda Dahl MD  05/11/23 [id number]

## 2023-05-11 NOTE — PROGRESS NOTES
Chart reviewed with ACC RN at time of encounter.    Hepatology notes reviewed, if adherent with taking Rifaximin & use of nutritional drinks, do feel a larger dose increase safe and will be necessary due to drug interaction, advise ~30% dose increase now with close follow up, with next INR by 05/16/2023    Jalyn Alvarado, PharmD BCACP  Anticoagulation Clinical Pharmacist

## 2023-05-11 NOTE — PROGRESS NOTES
May 11, 2023 3:13 PM - Michael Solares RN:     New [facility] Listing    Listing MELD: 21  ABO: O  Diagnosis: alcoholic liver disease with ascites, ESRD on HD.    Patient Open to All Extended Criteria, KDPI > 85%.  Not eligible for Living Donor.

## 2023-05-11 NOTE — ED PROVIDER NOTES
ED Provider Note  [facility]      History     Chief Complaint   Patient presents with     Chest Pain     HPI  Damion Quinones is a 67 year old male with PMHx of arthritis, CAD, alcoholic cirrhosis of liver with ascites, type 2 DM, obesity, psoriatic arthritis, c. difficile colitis, Hx of blood transfusion, HTN, Hx of hemochromatosis, chronic kidney disease, PAF, hypokalemia who presents to the ED with c/o Chest Pain onset yesterday. Pt states yesterday he began to experience rt sided chest pain. Pt also reports assoicated fatigue and nausea for the past couple days. Pt states he has not had dialysis since 05/08/2023 secondary to complications with dialysis port. Pt states he is scheduled to have his port replaced today at noon and dialysis tomorrow morning at 0530. Pt came here to the ED for further evaluation for his chest pain. No modifying factors for the chest pain. Pt denies breathing difficulty, vomiting, fever, and other acute complaints. Of note, pt reports Hx of blood clots. Pt states he was placed on the bloodthinner warfarin less than a week ago.     Past Medical History  Past Medical History:   Diagnosis Date     Alcoholic cirrhosis of liver with ascites (H) 10/11/2019     Arthritis     RA     C. difficile colitis      Chronic kidney disease      Coronary artery disease     AFib 2016     Diabetes mellitus type 2, uncomplicated (H) 10/11/2019     History of blood transfusion     2016     History of hemochromatosis 10/11/2019     Hypertension      Hypokalemia      Obesity      PAF (paroxysmal atrial fibrillation) (H)      Psoriatic arthritis (H)      Past Surgical History:   Procedure Laterality Date     APPENDECTOMY      Removed at 18 Years Old      COLONOSCOPY      2014 at [facility]      CV CORONARY ANGIOGRAM N/A 4/27/2023    Procedure: Coronary Angiogram;  Surgeon: Pablo Araujo MD;  Location:  HEART CARDIAC CATH LAB     EYE SURGERY Bilateral      "Cataract     HERNIA REPAIR      History of bilateral inguinal hernia repair: 10/28/2014. Open hernia repair: 10/2017. Abdominal wound exploration and debridement 12/27/2017     INSERT SHUNT PORTAL TRANSJUGULAR INTRAHEPTIC  09/26/2022     IR CVC TUNNEL PLACEMENT > 5 YRS OF AGE  01/26/2023     bumetanide (BUMEX) 1 MG tablet  Calcium Carbonate-Vitamin D 500-3.125 MG-MCG TABS  folic acid (FOLVITE) 1 MG tablet  lactulose (CEPHULAC) 10 GM packet  metoprolol succinate ER (TOPROL XL) 25 MG 24 hr tablet  midodrine (PROAMATINE) 2.5 MG tablet  omeprazole 20 MG tablet  predniSONE (DELTASONE) 5 MG tablet  spironolactone (ALDACTONE) 25 MG tablet  XIFAXAN 550 MG TABS tablet  zinc oxide (DESITIN) 40 % paste  zinc sulfate (ZINCATE) 220 (50 Zn) MG capsule      No Known Allergies  Family History  Family History   Problem Relation Age of Onset     Lung Cancer Mother      Colon Cancer Father      Lung Cancer Brother      Heart Failure Brother      Kidney Disease Brother      Social History   Social History     Tobacco Use     Smoking status: Never     Passive exposure: Never     Smokeless tobacco: Never   Vaping Use     Vaping status: Never Used   Substance Use Topics     Alcohol use: Not Currently     Comment: Last ETOH use was 12/31/2021     Drug use: Not Currently         A medically appropriate review of systems was performed with pertinent positives and negatives noted in the HPI, and all other systems negative.    Physical Exam   BP: 118/83  Pulse: 87  Temp: 98  F (36.7  C)  Resp: 18  Height: 172.7 cm (5' 8\")  Weight: 99.8 kg (220 lb)  SpO2: 100 %  Physical Exam  Physical Exam   Constitutional: oriented to person, place, and time. appears well-developed and well-nourished.   HENT:   Head: Normocephalic and atraumatic.   Neck: Normal range of motion.   Pulmonary/Chest: Effort normal. No respiratory distress. Clear lungs bilaterally. No reproducible chest wall tenderness.   Cardiac: No murmurs, rubs, gallops. RRR.  Abdominal: " Abdomen soft, nontender, nondistended. No rebound tenderness.  MSK: Bilaterally trace edema in hands and feet.   Neurological: alert and oriented to person, place, and time.   Skin: Skin is warm and dry.   Psychiatric:  normal mood and affect.  behavior is normal. Thought content normal.       ED Course, Procedures, & Data      Procedures       ED Course Selections:        EKG Interpretation:       Interpreted by Chris Avalos MD  Time reviewed: 0350  Symptoms at time of EKG: ? hyperkalemia   Rhythm: atrial fibrillation - controlled  Rate: Normal  Axis: Normal  Ectopy: none  Conduction: normal  ST Segments/ T Waves: No acute ischemic changes  Q Waves: none  Comparison to prior: Unchanged     Clinical Impression: no acute changes, atrial fibrillation (chronic)           Results for orders placed or performed during the hospital encounter of 05/11/23   EKG 12-lead, tracing only     Status: None (Preliminary result)   Result Value Ref Range    Systolic Blood Pressure  mmHg    Diastolic Blood Pressure  mmHg    Ventricular Rate 75 BPM    Atrial Rate 88 BPM    VT Interval  ms    QRS Duration 74 ms     ms    QTc 433 ms    P Axis  degrees    R AXIS 46 degrees    T Axis -30 degrees    Interpretation ECG       Atrial fibrillation  Abnormal QRS-T angle, consider primary T wave abnormality  Abnormal ECG       Medications - No data to display  Labs Ordered and Resulted from Time of ED Arrival to Time of ED Departure - No data to display  XR Chest 2 Views    (Results Pending)          Critical care was not performed.     Medical Decision Making  The patient's presentation was of high complexity (an acute health issue posing potential threat to life or bodily function).    The patient's evaluation involved:  review of external note(s) from 1 sources (prior cardiology note, angio note)  ordering and/or review of 3+ test(s) in this encounter (see separate area of note for details)  discussion of management or test  interpretation with another health professional (cardiology team)    The patient's management necessitated high risk (a decision regarding hospitalization) and further care after sign-out to David (see their note for further management).      Assessment & Plan    MDM  Patient presenting with chest pain and some mild shortness of breath.  EKG here is reassuring without evidence of acute ischemia or infarction.  Troponin is elevated however difficult to interpret in this patient who is on dialysis.  Pain has been resolved during his time here.  We will repeat a troponin after discussion with cardiology team briefly.  If this does not change does not need admission per cardiology.  He is not fluid overload, acidemic or hyperkalemic.  I do not feel that he needs emergent dialysis today and he has a scheduled for tomorrow.  Discussed plan with patient and they agree.  They will reschedule their hepatology appointment today.  Patient signed out to oncoming physician who will follow up troponin and CT.    I have reviewed the nursing notes. I have reviewed the findings, diagnosis, plan and need for follow up with the patient.    New Prescriptions    No medications on file       Final diagnoses:   Chest wall pain       Chris Avalos  [facility] EMERGENCY DEPARTMENT  5/11/2023     Chris Avalos MD  05/11/23 0758

## 2023-05-11 NOTE — DISCHARGE INSTRUCTIONS
Follow-up at [facility] as [facility] for your catheter exchange today    Return to the emergency department if you develop worsening chest pain, shortness of breath or if you have any further concerns

## 2023-05-11 NOTE — ED TRIAGE NOTES
Patient reports right anterior chest pain that started yesterday evening. States he has not had dialysis in several days d/t complications with dialysis port. Reports mild shortness of breath. Unknown precipitating, aggravating, or alleviating factors of chest pain.      Triage Assessment     Row Name 05/11/23 0335       Triage Assessment (Adult)    Airway WDL WDL       Respiratory WDL    Respiratory WDL X;rhythm/pattern    Rhythm/Pattern, Respiratory shortness of breath       Skin Circulation/Temperature WDL    Skin Circulation/Temperature WDL WDL       Cardiac WDL    Cardiac WDL X;chest pain       Chest Pain Assessment    Chest Pain Location anterior chest, right    Character aching    Duration Continuous    Precipitating Factors unknown    Alleviating Factors nothing    Associated Signs/Symptoms dyspnea    Chest Pain Intervention 12-lead ECG obtained       Peripheral/Neurovascular WDL    Peripheral Neurovascular WDL X  Edema BUE and BLE       Cognitive/Neuro/Behavioral WDL    Cognitive/Neuro/Behavioral WDL WDL

## 2023-05-11 NOTE — LETTER
May 11, 2023    Dmaion Quinones  [address] [facility] [address]      Dear Mr. Quinones,    This letter is sent to confirm that you have completed your transplant work-up and you are a candidate in the liver and kidney transplant program at the [facility].  You were placed on the liver and kidney active waitlist on 2023.      When you are active on the waitlist and an organ becomes available, a coordinator will need to speak to you immediately.  You could be contacted at any time during the day or night as an organ could become available at any time.  Please make certain our office always has your current telephone numbers and address.      You are now listed for  donor transplant. Now that the evaluation process is complete, the general hepatology team will take over the management of your cares. This team also works with your hepatologist and will be your main contact for symptom management, appointments, medications, and any questions that are not specific to transplant. You can reach this team by calling [phone] or [facility] message your hepatology provider. The transplant team will continue to monitor MELD labs and notify you when MELD labs are due.     Items we will need from you:    We have received approval from your insurance company for the transplant procedure.  It is critical that you notify us if there is any change in your insurance.  It is also important that you familiarize yourself with the details of your specific insurance policy.  Our patient  is available to assist you if you should have any questions regarding your coverage.    You will need to have labs drawn frequently while you are listed. The frequency is based on your MELD score. Your current listing MELD is 21. Below is a chart to help you understand how often to have labs drawn.  If you are uncertain of your MELD score/how often you need labs,  please contact your Transplant Coordinator.  Additionally, if you would like to have labs drawn outside the  [employer] system, please notify me to make arrangements to have labs ordered.  It will be your responsibility to remind your physician to forward your results to the Transplant Office.                MELD greater than 25 - Weekly labs              MELD 18-24 - Monthly labs                    MELD 17 or less- every 3 months    During this waiting period, we may request additional periodic laboratory tests with your primary physician.  It will be your responsibility to remind your physician to forward your results to the Transplant Office.    We need to be kept informed of any changes in your medical condition such as:    changes in your medications,   significant changes in your health  significant infections (such as pneumonia or abscesses)  blood transfusions  any condition which requires hospitalization  any surgery    Remember to complete any routine cancer screening tests required before your transplant.  This includes colonoscopy; prostate screening for men, and mammogram and gynecologic testing for women, as well as dental work.  Your primary care clinic can assist you with arranging for these exams.  Remind your caregivers to forward copies of the records and final reports.    We want you to know that our program has physician and surgeon coverage 24 hours a day, 365 days a year. In addition, our transplant surgeons and physicians will not be on call for two or more transplant programs more than 30 miles apart unless the circumstances have been reviewed and approved by the [employer] Organ Sharing (UNOS) Membership and Professional Standards Committee (MPSC). Finally, our primary physician and primary surgeons are not designated as the primary surgeon or primary physician at more than 1 transplant hospital. If this coverage changes or there are substantial program changes, you will be  notified in writing by letter.     Attached is a letter from UNOS that describes the services and information offered to patients by UNOS and the Organ Procurement and Transplantation Network (OPTN).    We appreciate having had the opportunity to participate in your care.  If you have questions, please feel free to call the Transplant Office at [phone] or [phone].    Sincerely,  Michael Solares RN  Pre-Liver Transplant Coordinator  [phone] (direct)  [phone] (fax)  Solid Organ Transplant  [facility]    Enclosures: OS Letter  cc: Care Team        The Organ Procurement and Transplantation Network  Toll-free patient services line:     Your resource for organ transplant information    If you have a question regarding your own medical care, you always should call your transplant hospital first. However, for general organ transplant-related information, you can call the Organ Procurement and Transplantation Network (OPTN) toll-free patient services line at [phone]- [phone]. Anyone, including potential transplant candidates, candidates, recipients, family members, friends, living donors, and donor family members, can call this number to:      Talk about organ donation, living donation, the transplant process, the donation process, and transplant policies.  Get a free patient information kit with helpful booklets, waiting list and transplant information, and a list of all transplant hospitals.  Ask questions about the OPTN website (https://optn.transplant.hrsa.gov/), the United Network for Organ Sharing s (UNOS) website (https://unos.org/), or the UNOS website for living donors and transplant recipients. (https://www.transplantliving.org/).  Learn how the OPTN can help you.  Talk about any concerns that you may have with a transplant hospital.    The [facility] s transplant system, the OPTN, is managed  under federal contract by the United Network for Organ Sharing (UNOS), which is a non-profit charitable organization. The OPTN helps create and define organ sharing policies that make the best use of donated organs. This process continuously evaluating new advances and discoveries so policies can be adapted to best serve patients waiting for transplants. To do so, the OPTN works closely with transplant professionals, transplant patients, transplant candidates, donor families, living donors, and the public. All transplant programs and organ procurement organizations throughout the country are OPTN members and are obligated to follow the policies the OPTN creates for allocating organs.    The OPTN also is responsible for:    Providing educational material for patients, the public, and professionals.  Raising awareness of the need for donated organs and tissue.  Coordinating organ procurement, matching, and placement.  Collecting information about every organ transplant and donation that occurs in the [place].    Remember, you should contact your transplant hospital directly if you have questions or concerns about your own medical care including medical records, work-up progress, and test results.    We are not your transplant hospital, and our staff will not be able to answer questions about your case, so please keep your transplant hospital s phone number handy.    However, while you research your transplant needs and learn as much as you can about transplantation and donation, we welcome your call to our toll-free patient services line at [phone].          Updated 4/1/2019

## 2023-05-12 ENCOUNTER — TELEPHONE (OUTPATIENT)
Dept: SLEEP MEDICINE | Facility: CLINIC | Age: 68
End: 2023-05-12

## 2023-05-12 DIAGNOSIS — I25.10 CORONARY ARTERY DISEASE INVOLVING NATIVE CORONARY ARTERY OF NATIVE HEART WITHOUT ANGINA PECTORIS: Primary | ICD-10-CM

## 2023-05-12 DIAGNOSIS — G47.30 SLEEP-RELATED BREATHING DISORDER: Primary | ICD-10-CM

## 2023-05-12 RX ORDER — ASPIRIN 81 MG/1
81 TABLET ORAL DAILY
Qty: 90 TABLET | Refills: 3 | Status: SHIPPED | OUTPATIENT
Start: 2023-05-12 | End: 2023-05-12

## 2023-05-12 RX ORDER — ATORVASTATIN CALCIUM 40 MG/1
40 TABLET, FILM COATED ORAL DAILY
Qty: 30 TABLET | Refills: 3 | Status: ON HOLD | OUTPATIENT
Start: 2023-05-12 | End: 2023-06-25

## 2023-05-12 NOTE — TELEPHONE ENCOUNTER
Routing to provider to advise    Deena MONACO RN  [facility]       <-- Click to add NO significant Past Surgical History

## 2023-05-12 NOTE — TELEPHONE ENCOUNTER
Patient Returning Call    Reason for call:  Wife called to discuss PSG denial. Should Casey have a HST or is the PSG going to be appealed?    Information relayed to patient:  Clinic will call back    Patient has additional questions:  Yes    What are your questions/concerns:  Now that the patient is on the transplant list is the sleep study even necessary? PSG is scheduled for 5/31 should it be cancelled?    Who does the patient want to speak with:  RN    Is an  needed?:  No      Could we send this information to you in [facility] or would you prefer to receive a phone call?:   Patient would prefer a phone call   Okay to leave a detailed message?: Yes at Home number on file [phone] (home)

## 2023-05-14 LAB
PLPETH BLD-MCNC: <10 NG/ML
POPETH BLD-MCNC: <10 NG/ML

## 2023-05-15 ENCOUNTER — LAB (OUTPATIENT)
Dept: LAB | Facility: CLINIC | Age: 68
End: 2023-05-15
Payer: COMMERCIAL

## 2023-05-15 ENCOUNTER — ANTICOAGULATION THERAPY VISIT (OUTPATIENT)
Dept: ANTICOAGULATION | Facility: CLINIC | Age: 68
End: 2023-05-15

## 2023-05-15 ENCOUNTER — MYC MEDICAL ADVICE (OUTPATIENT)
Dept: TRANSPLANT | Facility: CLINIC | Age: 68
End: 2023-05-15

## 2023-05-15 DIAGNOSIS — K70.30 ALCOHOLIC CIRRHOSIS (H): Primary | ICD-10-CM

## 2023-05-15 DIAGNOSIS — N18.5 CHRONIC KIDNEY DISEASE, STAGE 5, KIDNEY FAILURE (H): ICD-10-CM

## 2023-05-15 DIAGNOSIS — K70.30 ALCOHOLIC CIRRHOSIS OF LIVER WITHOUT ASCITES (H): ICD-10-CM

## 2023-05-15 DIAGNOSIS — K70.30 ALCOHOLIC CIRRHOSIS (H): ICD-10-CM

## 2023-05-15 DIAGNOSIS — I48.20 CHRONIC ATRIAL FIBRILLATION (H): Primary | ICD-10-CM

## 2023-05-15 LAB
AFP SERPL-MCNC: 9.7 NG/ML
ALBUMIN SERPL BCG-MCNC: 2.9 G/DL (ref 3.5–5.2)
ALP SERPL-CCNC: 211 U/L (ref 40–129)
ALT SERPL W P-5'-P-CCNC: 24 U/L (ref 10–50)
ANION GAP SERPL CALCULATED.3IONS-SCNC: 9 MMOL/L (ref 7–15)
AST SERPL W P-5'-P-CCNC: 53 U/L (ref 10–50)
BILIRUB DIRECT SERPL-MCNC: <0.2 MG/DL (ref 0–0.3)
BILIRUB SERPL-MCNC: 0.6 MG/DL
BUN SERPL-MCNC: 17.9 MG/DL (ref 8–23)
CALCIUM SERPL-MCNC: 8.6 MG/DL (ref 8.8–10.2)
CHLORIDE SERPL-SCNC: 107 MMOL/L (ref 98–107)
CREAT SERPL-MCNC: 2.18 MG/DL (ref 0.67–1.17)
DEPRECATED HCO3 PLAS-SCNC: 25 MMOL/L (ref 22–29)
GFR SERPL CREATININE-BSD FRML MDRD: 33 ML/MIN/1.73M2
GLUCOSE SERPL-MCNC: 102 MG/DL (ref 70–99)
HOLD SPECIMEN: NORMAL
INR PPP: 1.37 (ref 0.85–1.15)
POTASSIUM SERPL-SCNC: 3.4 MMOL/L (ref 3.4–5.3)
PROT SERPL-MCNC: 5 G/DL (ref 6.4–8.3)
SODIUM SERPL-SCNC: 141 MMOL/L (ref 136–145)

## 2023-05-15 PROCEDURE — 36415 COLL VENOUS BLD VENIPUNCTURE: CPT

## 2023-05-15 PROCEDURE — 85610 PROTHROMBIN TIME: CPT | Mod: QW

## 2023-05-15 PROCEDURE — 82248 BILIRUBIN DIRECT: CPT

## 2023-05-15 PROCEDURE — 86832 HLA CLASS I HIGH DEFIN QUAL: CPT

## 2023-05-15 PROCEDURE — 81378 HLA I & II TYPING HR: CPT

## 2023-05-15 PROCEDURE — 86833 HLA CLASS II HIGH DEFIN QUAL: CPT

## 2023-05-15 PROCEDURE — 80053 COMPREHEN METABOLIC PANEL: CPT

## 2023-05-15 PROCEDURE — 82105 ALPHA-FETOPROTEIN SERUM: CPT

## 2023-05-15 PROCEDURE — 81382 HLA II TYPING 1 LOC HR: CPT | Mod: 59

## 2023-05-15 NOTE — PROGRESS NOTES
ANTICOAGULATION MANAGEMENT     Damion Quinones 67 year old male is on warfarin with subtherapeutic INR result. (Goal INR 2.0-3.0)    Recent labs: (last 7 days)     05/15/23  1022   INR 1.37*       ASSESSMENT       Source(s): Chart Review and Patient/Caregiver Call       Warfarin doses taken: Warfarin taken as instructed    Diet: No new diet changes identified    Medication/supplement changes: None noted    New illness, injury, or hospitalization: No    Signs or symptoms of bleeding or clotting: No    Previous result: Subtherapeutic    Additional findings: New start on day 11 of warfarin         PLAN     Recommended plan for no diet, medication or health factor changes affecting INR     Dosing Instructions: booster dose then Increase your warfarin dose (16.7% change) with next INR by Friday       Summary  As of 5/15/2023    Full warfarin instructions:  5/15: 3 mg; Otherwise 2 mg every day   Next INR check:  5/19/2023             Telephone call with  wifeApoorva who agrees to plan and repeated back plan correctly    Lab visit scheduled    Education provided:     Contact [phone] with any changes, questions or concerns.     Plan made with [facility] Pharmacist Darlene Maharaj RN  Anticoagulation Clinic  5/15/2023    _______________________________________________________________________     Anticoagulation Episode Summary     Current INR goal:  2.0-3.0   TTR:  0.0 % (2 d)   Target end date:  5/3/2024   Send INR reminders to:  [facility] [place]    Indications    Chronic atrial fibrillation (H) [I48.20]           Comments:           Anticoagulation Care Providers     Provider Role Specialty Phone number    Gary Serrano MD Referring Family Medicine [phone]

## 2023-05-16 ENCOUNTER — DOCUMENTATION ONLY (OUTPATIENT)
Dept: TRANSPLANT | Facility: CLINIC | Age: 68
End: 2023-05-16
Payer: COMMERCIAL

## 2023-05-17 ENCOUNTER — OFFICE VISIT (OUTPATIENT)
Dept: FAMILY MEDICINE | Facility: CLINIC | Age: 68
End: 2023-05-17
Payer: COMMERCIAL

## 2023-05-17 VITALS
OXYGEN SATURATION: 100 % | HEART RATE: 85 BPM | TEMPERATURE: 97.2 F | BODY MASS INDEX: 34.97 KG/M2 | RESPIRATION RATE: 16 BRPM | SYSTOLIC BLOOD PRESSURE: 111 MMHG | WEIGHT: 230 LBS | DIASTOLIC BLOOD PRESSURE: 78 MMHG

## 2023-05-17 DIAGNOSIS — L08.9 LOCAL INFECTION OF SKIN AND SUBCUTANEOUS TISSUE: Primary | ICD-10-CM

## 2023-05-17 PROCEDURE — 99214 OFFICE O/P EST MOD 30 MIN: CPT | Performed by: PHYSICIAN ASSISTANT

## 2023-05-17 PROCEDURE — 87070 CULTURE OTHR SPECIMN AEROBIC: CPT | Performed by: PHYSICIAN ASSISTANT

## 2023-05-17 RX ORDER — FOLIC ACID/VIT B COMPLEX AND C 0.8 MG
1 TABLET ORAL
COMMUNITY
Start: 2023-05-13 | End: 2023-05-18

## 2023-05-17 RX ORDER — LACTULOSE 10 G/15ML
20 SOLUTION ORAL 2 TIMES DAILY
Status: ON HOLD | COMMUNITY
Start: 2023-05-04 | End: 2023-06-25

## 2023-05-17 RX ORDER — FOLIC ACID/VIT B COMPLEX AND C 0.8 MG
1 TABLET ORAL DAILY
Status: ON HOLD | COMMUNITY
Start: 2023-02-07 | End: 2023-06-25

## 2023-05-17 RX ORDER — ONDANSETRON 4 MG/1
1 TABLET, ORALLY DISINTEGRATING ORAL EVERY 8 HOURS PRN
Status: ON HOLD | COMMUNITY
Start: 2023-02-13 | End: 2023-06-25

## 2023-05-17 RX ORDER — DOXYCYCLINE 100 MG/1
100 TABLET ORAL 2 TIMES DAILY
Qty: 20 TABLET | Refills: 0 | Status: SHIPPED | OUTPATIENT
Start: 2023-05-17 | End: 2023-05-27

## 2023-05-17 ASSESSMENT — PAIN SCALES - GENERAL: PAINLEVEL: NO PAIN (0)

## 2023-05-17 NOTE — ADDENDUM NOTE
Encounter addended by: Syd Grove, PT on: 5/17/2023 11:24 AM   Actions taken: Clinical Note Signed, Document created, Document edited

## 2023-05-17 NOTE — PROGRESS NOTES
Assessment & Plan     Local infection of skin and subcutaneous tissue: pt was seen for rash at the elbow x 3 days.  Rash is concerning for bacterial infection with surrounding cellulitis. Considered shingles, but appearance is not typical of that and he has pustules rather than vesicals.  Considered contact dermatitis or non-speicific dermatitis, though would not expect pustules, though may have started with a contact dermatitis with secondary infection.   He has no systemic sx and is vitally normal.    Pt given doxycycline, to cover MRSA.  No dose adjustment needed for dialysis or liver impairment. Await cultures and sensitivity.    Discussed warm compresses.  Keep covered if draining.    Monitor for systemic sx and if fever, rapidly worsening rash should be seen immediately.    Pt informed doxycycline will likely affect his INR. He is not yet therapeutic.  He should inform INR nurse of his medication use. Will need frequent monitoring and dose adjustment.    Follow-up with pcp for persistent or worsening sx.  Discussed with pcp.      - doxycycline monohydrate (ADOXA) 100 MG tablet  Dispense: 20 tablet; Refill: 0  - Wound Aerobic Bacterial Culture Routine     56}      Thelma Kenney PA-C  [facility]    Lopez Peña is a 67 year old male who presents to clinic today for the following health issues:  Chief Complaint   Patient presents with     Derm Problem     Red itchy rash on right elbow.      History of Present Illness       Reason for visit:  Blisters on elbow  Symptom onset:  3-7 days ago  Symptom intensity:  Severe  Symptom progression:  Staying the same  Had these symptoms before:  No  What makes it worse:  No  What makes it better:  No    He eats 2-3 servings of fruits and vegetables daily.He consumes 0 sweetened beverage(s) daily.He exercises with enough effort to increase his heart rate 10 to 19 minutes per day.  He exercises with enough effort to increase his heart  rate 3 or less days per week.   He is taking medications regularly.    PT is accompanied his wife today. He has a complicated medical history including ESRD, on dialysis, a fib, dvt, psoriatic arthritis, alcoholic cirrhosis of the liver, diabetes, on transplant list.    He has a 3 day hx of erythema, tenderness, and warmth of the R elbow with new rash.  Denies trauma. Has chronic edema of the UE.  No fevers. Otherwise feels well.      Review of Systems  Constitutional, HEENT, cardiovascular, pulmonary, gi and gu systems are negative, except as otherwise noted.      Objective    /78 (BP Location: Left arm, Patient Position: Sitting)   Pulse 85   Temp 97.2  F (36.2  C) (Tympanic)   Resp 16   Wt 104.3 kg (230 lb)   SpO2 100%   BMI 34.97 kg/m    Physical Exam   Exam of the R elbow reveals erythematous papules and pustules, with surrounding erythema, warmth and TTP.  One unroofed and easily drained purulent discharge that was cultured.

## 2023-05-17 NOTE — PROGRESS NOTES
"    [facility]                                                                                   OUTPATIENT PHYSICAL THERAPY      PLAN OF TREATMENT FOR OUTPATIENT REHABILITATION   Patient's Last Name, First Name, Damion Saenz YOB: 1955   Provider's Name   [facility]   Medical Record No.  [id number]     Onset Date:  9/1/22 Start of Care Date:  2/28/23     Medical Diagnosis:     Alcoholic cirrhosis (H)    PT Treatment Diagnosis:    Impaired functional mobility, balance, gait, and endurance Plan of Treatment  Frequency/Duration:  /  1 time/week    Certification date from  2/28/23 to    5/9/23       See note for plan of treatment details and functional goals     Syd Grove, PT                         I CERTIFY THE NEED FOR THESE SERVICES FURNISHED UNDER        THIS PLAN OF TREATMENT AND WHILE UNDER MY CARE     (Physician attestation of this document indicates review and certification of the therapy plan).                  Referring Provider:  Jose Poole      Initial Assessment  Casey is a 67-year-old male who presents to physical therapy with impaired functional mobility, balance, gait, and endurance, secondary to kidney/liver compromise, upper and lower extremity swelling, DVT, and pain. These impairments limit his ability to walk, sit, stand, navigate stairs, and perform other ADLs and leisure activities without pain and limitation. He will benefit from skilled therapy to address the listed impairments and limitations and improve his tolerance to functional activity.              02/28/23 1100   General Information   Type of Visit Initial OP Ortho PT Evaluation   Start of Care Date 02/28/23   Referring Physician Dr. Jose Poole MD   Patient/Family Goals Statement \"Get stronger:   Orders Evaluate and Treat   Date of Order 12/21/22   Certification Required? No   Medical Diagnosis Alcoholic cirrhosis (H)   Surgical/Medical history " reviewed Yes   Presentation and Etiology   Pertinent history of current problem (include personal factors and/or comorbidities that impact the POC) Casey reports that in September, 2023 his kidneys started to fail. He also reports that his liver is stable but not in good condition either. He has been in and out of the hospital and working with various doctors since then to manage his condition. He started dialysis in November, and things have improved since, but he is currently waiting for kidney/liver transplant. He currently goes to dialysis on Monday, Wednesday, and Friday for about 3.5 hours each day. He was in the hospital a couple of weeks ago for C-diff, and a few days after discharge, he started to notice swelling in his left arm. He went in to have it checked out, and he was diagnosed with a DVT in the left arm. He now presents to PT for general strength and conditioning. He has some tingling in his right arm that started when the swelling started, and as the swelling has decreased, so have his other symptoms. He also reports central low back pain due to prolonged sitting. He reports walking up to 1-2 hours a day total.   Impairments A. Pain;D. Decreased ROM;E. Decreased flexibility;F. Decreased strength and endurance;G. Impaired balance;H. Impaired gait;L. Tingling   Functional Limitations perform activities of daily living;perform desired leisure / sports activities   Symptom Location See history   How/Where did it occur    (See history)   Onset date of current episode/exacerbation 09/01/22   Chronicity Chronic   Pain rating (0-10 point scale) Best (/10);Worst (/10);Other   Best (/10) 1/10   Worst (/10) 10/10   Pain rating comment Current: 6/10   Pain quality C. Aching   Frequency of pain/symptoms A. Constant   Pain/symptoms exacerbated by C. Lifting;D. Carrying;H. Overhead reach;I. Bending;J. ADL;K. Home tasks   Pain/symptoms eased by H. Cold;E. Changing positions   Progression of symptoms since onset:  "Improved   Prior Level of Function   Prior Level of Function-Mobility Independent   Prior Level of Function-ADLs Independent   Fall Risk Screen   Fall screen completed by PT   Have you fallen 2 or more times in the past year? No   Have you fallen and had an injury in the past year? No   Is patient a fall risk? No   Abuse Screen (yes response referral indicated)   Feels Unsafe at Home or Work/School no   Feels Threatened by Someone no   Does Anyone Try to Keep You From Having Contact with Others or Doing Things Outside Your Home? no   Physical Signs of Abuse Present no   Patient needs abuse support services and resources No   Vitals Signs   Heart Rate 87   SpO2 99   Blood Pressure 110/67   Vital Signs Comments Patient reported that these vitals are a little lower than \"normal\" but higher than when they are taken at the beginning of dialysis.   System Outcome Measures   Outcome Measures    (LEFS: 16/80)   Lumbar Spine/SI Objective Findings   Gait/Locomotion Wide base of support, decreased speed. 6 MWT: 261.52 meters (stopped at 5:25; rest break for 45 seconds at [facility]).   Hip Flexion (L2) Strength Right: 4+/5. Left: 4/5.   Hip Abduction Strength Bilateral: 5/5   Hip Adduction Strength Bilateral: 5/5   Knee Flexion Strength Bilateral: 5/5   Knee Extension (L3) Strength Bilateral: 5/5   Lumbar/Hip/Knee/Foot Strength Comments 30 Second Sit-to-stand: 9 reps   Cervical Spine   Shoulder Shrug (C2-C4) Strength Left: 5/5   Shoulder Abd (C5) Strength Left: 5/5   Shoulder ER (C5, C6) Strength Left: 5/5   Shoulder IR (C5, C6) Strength Left: 5/5   Elbow Flexion (C5, C6) Strength Left: 4+5   Elbow Extension (C7) Strength Left: 4-/5   Wrist Extension (C6) Strength Left: 5/5   Thumb Abd (C8) Strength Left: 5/5   5th Finger Add (T1) Strength Left: 5/5   Shoulder/Wrist/Hand Strength Comments Right arm testing held due to current DVT.   Planned Therapy Interventions   Planned Therapy Interventions balance training;gait " training;neuromuscular re-education;ROM;strengthening;stretching   Clinical Impression   Criteria for Skilled Therapeutic Interventions Met yes, treatment indicated   PT Diagnosis Impaired functional mobility, balance, gait, and endurance   Influenced by the following impairments Organ failure, upper and lower extremity swelling, DVT, pain   Functional limitations due to impairments Walk, sit, stand, navigate stairs, and other ADLs and leisure activities   Clinical Presentation Evolving/Changing   Clinical Decision Making (Complexity) Moderate complexity   Therapy Frequency 1 time/week   Predicted Duration of Therapy Intervention (days/wks) 10 weeks   Risk & Benefits of therapy have been explained Yes   Patient, Family & other staff in agreement with plan of care Yes   Clinical Impression Comments Casey is a 67-year-old male who presents to physical therapy with impaired functional mobility, balance, gait, and endurance, secondary to kidney/liver compromise, upper and lower extremity swelling, DVT, and pain. These impairments limit his ability to walk, sit, stand, navigate stairs, and perform other ADLs and leisure activities without pain and limitation. He will benefit from skilled therapy to address the listed impairments and limitations and improve his tolerance to functional activity.   Ortho Goal 1   Goal Identifier LEFS - Short Term   Goal Description Casey will demonstrate improved tolerance as evidenced by an improved LEFS score of at least 25/80.   Goal Progress 16/80   Target Date 03/28/23   Ortho Goal 2   Goal Identifier LEFS - Long Term   Goal Description Casey will demonstrate improved tolerance as evidenced by an improved LEFS score of at least 50/80.   Goal Progress 16/80   Target Date 05/09/23   Ortho Goal 3   Goal Identifier 30 Second Sit-to-stand   Goal Description Casey will demonstrate increased tolerance to functional activity and improved lower extremity strength as evidenced by an improved 30 second  sit-to-stand score of at least 16 reps.   Goal Progress 9 reps   Target Date 05/09/23   Ortho Goal 4   Goal Identifier 6 MWT - Short Term   Goal Description Casey will demonstrate improved functional capacity and increased tolerance to functional mobility as evidenced by an improved 6 MWT distance of at least 325 meters.   Goal Progress 261.52 meters (stopped at 5:25 with 45 second standing break at group home [facility])   Ortho Goal 5   Goal Identifier 6 MWT - Long Term   Goal Description Casey will demonstrate improved functional capacity and increased tolerance to functional mobility as evidenced by an improved 6 MWT distance of at least 450 meters.   Goal Progress 261.52 meters (stopped at 5:25 with 45 second standing break at group home [facility])   Target Date 05/09/23   Ortho Goal 6   Goal Identifier FGA   Goal Description Will assess next visit.   Goal Progress Will assess next visit.   Target Date 05/09/23   Total Evaluation Time   PT Eval, Moderate Complexity Minutes (47430) 30

## 2023-05-17 NOTE — PATIENT INSTRUCTIONS
Please seek immediate care worsening symptoms, high fever, systemically ill.    We will call with culture results.      Any antibiotic will cause INR to be elevated, this one especially.  INR nurse may adjust coumadin, will need frequent rechecks.

## 2023-05-18 ENCOUNTER — TELEPHONE (OUTPATIENT)
Dept: ANTICOAGULATION | Facility: CLINIC | Age: 68
End: 2023-05-18
Payer: COMMERCIAL

## 2023-05-18 LAB
A*: NORMAL
A*LOCUS SEROLOGIC EQUIVALENT: 1
A*LOCUS: NORMAL
A*SEROLOGIC EQUIVALENT: 3
ABTEST METHOD: NORMAL
B*: NORMAL
B*LOCUS SEROLOGIC EQUIVALENT: 7
B*LOCUS: NORMAL
B*SEROLOGIC EQUIVALENT: 38
BW-1: NORMAL
BW-2: NORMAL
C*: NORMAL
C*LOCUS SEROLOGIC EQUIVALENT: 7
C*LOCUS: NORMAL
C*SEROLOGIC EQUIVALENT: 12
DPA1*: NORMAL
DPA1*LOCUS: NORMAL
DPB1*: NORMAL
DPB1*LOCUS: NORMAL
DQA1*: NORMAL
DQA1*LOCUS: NORMAL
DQB1*: NORMAL
DQB1*LOCUS SEROLOGIC EQUIVALENT: 6
DQB1*LOCUS: NORMAL
DQB1*SEROLOGIC EQUIVALENT: 6
DRB1*: NORMAL
DRB1*LOCUS SEROLOGIC EQUIVALENT: 13
DRB1*LOCUS: NORMAL
DRB1*SEROLOGIC EQUIVALENT: 15
DRB3*LOCUS SEROLOGIC EQUIVALENT: 52
DRB3*LOCUS: NORMAL
DRB5*: NORMAL
DRB5*SEROLOGIC EQUIVALENT: 51
DRSSO TEST METHOD: NORMAL

## 2023-05-18 NOTE — TELEPHONE ENCOUNTER
ANTICOAGULATION  MANAGEMENT     Interacting Medication Review    Interacting medication(s): Doxycycline with warfarin.    Duration: 10 days  (5/17 to 5/26)    Indication: Skin infection    New medication?: Yes, interaction may increase INR and risk of bleeding. Closer INR monitoring recommended.        PLAN     Continue your current warfarin dose with next INR in 1 day              Patient was not contacted  Already scheduled for INR on 5/19.      No adjustment to anticoagulation calendar was required    Plan made per [facility] anticoagulation protocol    Adeline Jo RN  Anticoagulation Clinic

## 2023-05-19 ENCOUNTER — OFFICE VISIT (OUTPATIENT)
Dept: FAMILY MEDICINE | Facility: CLINIC | Age: 68
End: 2023-05-19
Payer: COMMERCIAL

## 2023-05-19 ENCOUNTER — LAB (OUTPATIENT)
Dept: LAB | Facility: CLINIC | Age: 68
End: 2023-05-19
Payer: COMMERCIAL

## 2023-05-19 ENCOUNTER — ANTICOAGULATION THERAPY VISIT (OUTPATIENT)
Dept: ANTICOAGULATION | Facility: CLINIC | Age: 68
End: 2023-05-19

## 2023-05-19 VITALS
OXYGEN SATURATION: 99 % | SYSTOLIC BLOOD PRESSURE: 102 MMHG | HEIGHT: 68 IN | TEMPERATURE: 97.5 F | RESPIRATION RATE: 20 BRPM | WEIGHT: 228 LBS | HEART RATE: 80 BPM | BODY MASS INDEX: 34.56 KG/M2 | DIASTOLIC BLOOD PRESSURE: 78 MMHG

## 2023-05-19 DIAGNOSIS — L08.9 LOCAL INFECTION OF SKIN AND SUBCUTANEOUS TISSUE: ICD-10-CM

## 2023-05-19 DIAGNOSIS — E11.9 TYPE 2 DIABETES MELLITUS WITHOUT COMPLICATION, UNSPECIFIED WHETHER LONG TERM INSULIN USE (H): Primary | ICD-10-CM

## 2023-05-19 DIAGNOSIS — N18.5 CKD (CHRONIC KIDNEY DISEASE) STAGE 5, GFR LESS THAN 15 ML/MIN (H): ICD-10-CM

## 2023-05-19 DIAGNOSIS — K70.30 ALCOHOLIC CIRRHOSIS (H): ICD-10-CM

## 2023-05-19 DIAGNOSIS — I48.20 CHRONIC ATRIAL FIBRILLATION (H): Primary | ICD-10-CM

## 2023-05-19 DIAGNOSIS — E11.9 TYPE 2 DIABETES MELLITUS WITHOUT COMPLICATION, UNSPECIFIED WHETHER LONG TERM INSULIN USE (H): ICD-10-CM

## 2023-05-19 DIAGNOSIS — M1A.9XX1 TOPHACEOUS GOUT: ICD-10-CM

## 2023-05-19 LAB
ALBUMIN SERPL BCG-MCNC: 3.1 G/DL (ref 3.5–5.2)
ALP SERPL-CCNC: 212 U/L (ref 40–129)
ALT SERPL W P-5'-P-CCNC: 33 U/L (ref 10–50)
ANION GAP SERPL CALCULATED.3IONS-SCNC: 11 MMOL/L (ref 7–15)
AST SERPL W P-5'-P-CCNC: 55 U/L (ref 10–50)
BACTERIA WND CULT: NO GROWTH
BILIRUB SERPL-MCNC: 0.8 MG/DL
BUN SERPL-MCNC: 17.6 MG/DL (ref 8–23)
CALCIUM SERPL-MCNC: 8.6 MG/DL (ref 8.8–10.2)
CHLORIDE SERPL-SCNC: 105 MMOL/L (ref 98–107)
CREAT SERPL-MCNC: 2.12 MG/DL (ref 0.67–1.17)
CREAT UR-MCNC: 92.6 MG/DL
DEPRECATED HCO3 PLAS-SCNC: 21 MMOL/L (ref 22–29)
GFR SERPL CREATININE-BSD FRML MDRD: 34 ML/MIN/1.73M2
GLUCOSE SERPL-MCNC: 109 MG/DL (ref 70–99)
HBA1C MFR BLD: 4.7 % (ref 0–5.6)
INR PPP: 1.74 (ref 0.85–1.15)
MICROALBUMIN UR-MCNC: <12 MG/L
MICROALBUMIN/CREAT UR: NORMAL MG/G{CREAT}
POTASSIUM SERPL-SCNC: 3.4 MMOL/L (ref 3.4–5.3)
PROT SERPL-MCNC: 5.4 G/DL (ref 6.4–8.3)
PTH-INTACT SERPL-MCNC: 67 PG/ML (ref 15–65)
SA 1 CELL: NORMAL
SA 1 TEST METHOD: NORMAL
SA 2 CELL: NORMAL
SA 2 TEST METHOD: NORMAL
SA1 HI RISK ABY: NORMAL
SA1 MOD RISK ABY: NORMAL
SA2 HI RISK ABY: NORMAL
SA2 MOD RISK ABY: NORMAL
SODIUM SERPL-SCNC: 137 MMOL/L (ref 136–145)
UNACCEPTABLE ANTIGENS: NORMAL
ZZZSA 1  COMMENTS: NORMAL
ZZZSA 2 COMMENTS: NORMAL

## 2023-05-19 PROCEDURE — 82570 ASSAY OF URINE CREATININE: CPT

## 2023-05-19 PROCEDURE — 80053 COMPREHEN METABOLIC PANEL: CPT

## 2023-05-19 PROCEDURE — 83036 HEMOGLOBIN GLYCOSYLATED A1C: CPT

## 2023-05-19 PROCEDURE — 85610 PROTHROMBIN TIME: CPT | Mod: QW

## 2023-05-19 PROCEDURE — 36415 COLL VENOUS BLD VENIPUNCTURE: CPT

## 2023-05-19 PROCEDURE — 99213 OFFICE O/P EST LOW 20 MIN: CPT | Performed by: PHYSICIAN ASSISTANT

## 2023-05-19 PROCEDURE — 83970 ASSAY OF PARATHORMONE: CPT

## 2023-05-19 PROCEDURE — 82043 UR ALBUMIN QUANTITATIVE: CPT

## 2023-05-19 ASSESSMENT — ENCOUNTER SYMPTOMS
CONSTITUTIONAL NEGATIVE: 1
RESPIRATORY NEGATIVE: 1
GASTROINTESTINAL NEGATIVE: 1
CARDIOVASCULAR NEGATIVE: 1

## 2023-05-19 NOTE — PROGRESS NOTES
"  1. Type 2 diabetes mellitus without complication, unspecified whether long term insulin use (H)      2. Local infection of skin and subcutaneous tissue  Continue on doxycycline, right forearm is improving, culture is in progress    3. Tophaceous gout  He has had flares of gout in the past which causes swelling and inflammation of his left forearm, advised to treat with 40 mg prednisone daily for 4 days, then 20 mg daily for 4 days and then resume his normal 10 mg daily dose of prednisone (they have a supply of prednisone at home), he also has supply of oxycodone at home to use if needed for pain, follow up if not improving over the next 2-3 days    4. CKD (chronic kidney disease) stage 5, GFR less than 15 ml/min (H)  On dialysis, and is on transplant list        Subjective   Casey is a 67 year old, presenting for the following health issues:  Hand Problem (Pt c/o left hand edema with a \"weeping sore\" on left forearm x 1 day) and Derm Problem (Pt also here for Follow-up on rash on right elbow from 5/17.)        5/17/2023    12:13 PM   Additional Questions   Roomed by Adriano   Accompanied by Spouse     HPI           He was seen on 5/17 and treated for suspected cellulitis of right elbow  He was started on doxycycline.  On 5/17 he also did have some swelling and weeping of his left forearm but that has gotten worse and becoming more painful    He had dialysis this morning    He does have a hx of gout and this sometimes causes swelling of his forearm    He has a complicated medical history including ESRD, on dialysis, a fib, dvt, psoriatic arthritis, alcoholic cirrhosis of the liver, diabetes, on transplant list.      Review of Systems   Constitutional: Negative.    HENT: Negative.    Respiratory: Negative.    Cardiovascular: Negative.    Gastrointestinal: Negative.    Skin:        Swelling, erythema and drainage from left forearm            Objective    /78 (BP Location: Right arm, Patient Position: Sitting, Cuff " "Size: Adult Large)   Pulse 80   Temp 97.5  F (36.4  C) (Tympanic)   Resp 20   Ht 1.727 m (5' 8\")   Wt 103.4 kg (228 lb)   SpO2 99%   BMI 34.67 kg/m    Body mass index is 34.67 kg/m .  Physical Exam  Vitals reviewed.   Constitutional:       Appearance: Normal appearance.   Cardiovascular:      Rate and Rhythm: Normal rate and regular rhythm.      Pulses: Normal pulses.      Heart sounds: Normal heart sounds.   Pulmonary:      Effort: Pulmonary effort is normal.      Breath sounds: Normal breath sounds.   Musculoskeletal:      Comments: Swelling and edema of left forearm and dorsum of left wrist, slight weeping  Good sensation and capillary refill in left fingertips   Neurological:      Mental Status: He is alert.                                "

## 2023-05-19 NOTE — PROGRESS NOTES
ANTICOAGULATION MANAGEMENT     Damion Quinones 67 year old male is on warfarin with subtherapeutic INR result. (Goal INR 2.0-3.0)    Recent labs: (last 7 days)     05/19/23  0956   INR 1.74*       ASSESSMENT       Source(s): Chart Review and Patient/Caregiver Call       Warfarin doses taken: Warfarin taken as instructed    Diet: No new diet changes identified    Medication/supplement changes: Prednisone 40 mg daily x4, 20 mg daily x4, then resume 10 mg daily.  Starting 5/19/23 per office visit. Can increase future INR's.    Continues Doxycycline bid for another 8 days.    New illness, injury, or hospitalization:  Ongoing pain, redness, swelling, drainage from elbow/arm. Possible gout.    Signs or symptoms of bleeding or clotting: No    Previous result: Subtherapeutic    Additional findings: None         PLAN     Recommended plan for ongoing change(s) affecting INR     Dosing Instructions: Increase your warfarin dose (14% change) with next INR in 3 days       Summary  As of 5/19/2023    Full warfarin instructions:  3 mg every Mon, Fri; 2 mg all other days   Next INR check:  5/23/2023             Telephone call with  wife, Apoorva who verbalizes understanding and agrees to plan and who agrees to plan and repeated back plan correctly    Lab visit scheduled    Education provided:     Goal range and lab monitoring: goal range and significance of current result and Importance of following up at instructed interval    Interaction IS anticipated between warfarin and Doxycycline and Prednisone    Symptom monitoring: monitoring for bleeding signs and symptoms    Contact [phone] with any changes, questions or concerns.     Plan made with [facility] Pharmacist Jalyn Naylor, LORIN  Anticoagulation Clinic  5/19/2023    _______________________________________________________________________     Anticoagulation Episode Summary     Current INR goal:  2.0-3.0   TTR:  0.0 % (6 d)   Target end date:  5/3/2024   Send INR  reminders to:  [facility] [place]    Indications    Chronic atrial fibrillation (H) [I48.20]           Comments:           Anticoagulation Care Providers     Provider Role Specialty Phone number    Gary Serrano MD Referring Family Medicine [phone]

## 2023-05-22 ENCOUNTER — LAB (OUTPATIENT)
Dept: LAB | Facility: CLINIC | Age: 68
End: 2023-05-22
Payer: COMMERCIAL

## 2023-05-22 ENCOUNTER — ANTICOAGULATION THERAPY VISIT (OUTPATIENT)
Dept: ANTICOAGULATION | Facility: CLINIC | Age: 68
End: 2023-05-22

## 2023-05-22 DIAGNOSIS — I48.20 CHRONIC ATRIAL FIBRILLATION (H): Primary | ICD-10-CM

## 2023-05-22 DIAGNOSIS — K70.30 ALCOHOLIC CIRRHOSIS (H): ICD-10-CM

## 2023-05-22 LAB
ALBUMIN SERPL BCG-MCNC: 3.1 G/DL (ref 3.5–5.2)
ALP SERPL-CCNC: 232 U/L (ref 40–129)
ALT SERPL W P-5'-P-CCNC: 41 U/L (ref 10–50)
ANION GAP SERPL CALCULATED.3IONS-SCNC: 11 MMOL/L (ref 7–15)
AST SERPL W P-5'-P-CCNC: 61 U/L (ref 10–50)
BILIRUB SERPL-MCNC: 0.5 MG/DL
BUN SERPL-MCNC: 28.1 MG/DL (ref 8–23)
CALCIUM SERPL-MCNC: 8.6 MG/DL (ref 8.8–10.2)
CHLORIDE SERPL-SCNC: 105 MMOL/L (ref 98–107)
CREAT SERPL-MCNC: 2.49 MG/DL (ref 0.67–1.17)
DEPRECATED HCO3 PLAS-SCNC: 24 MMOL/L (ref 22–29)
GFR SERPL CREATININE-BSD FRML MDRD: 28 ML/MIN/1.73M2
GLUCOSE SERPL-MCNC: 87 MG/DL (ref 70–99)
INR PPP: 2.09 (ref 0.85–1.15)
POTASSIUM SERPL-SCNC: 3.6 MMOL/L (ref 3.4–5.3)
PROT SERPL-MCNC: 5.4 G/DL (ref 6.4–8.3)
SODIUM SERPL-SCNC: 140 MMOL/L (ref 136–145)

## 2023-05-22 PROCEDURE — 85610 PROTHROMBIN TIME: CPT | Mod: QW

## 2023-05-22 PROCEDURE — 36415 COLL VENOUS BLD VENIPUNCTURE: CPT

## 2023-05-22 PROCEDURE — 80053 COMPREHEN METABOLIC PANEL: CPT

## 2023-05-22 NOTE — PROGRESS NOTES
ANTICOAGULATION MANAGEMENT     Damion Quinones 67 year old male is on warfarin with therapeutic INR result. (Goal INR 2.0-3.0)    Recent labs: (last 7 days)     05/22/23  1013   INR 2.09*       ASSESSMENT       Source(s): Chart Review and Patient/Caregiver Call       Warfarin doses taken: Warfarin taken as instructed    Diet: No new diet changes identified    Medication/supplement changes: None noted    New illness, injury, or hospitalization: No    Signs or symptoms of bleeding or clotting: No    Previous result: Subtherapeutic-14% dose increase    Additional findings: None     PLAN     Recommended plan for ongoing change(s) affecting INR     Dosing Instructions: Continue your current warfarin dose with next INR in 3-4 days       Summary  As of 5/22/2023    Full warfarin instructions:  3 mg every Mon, Fri; 2 mg all other days   Next INR check:  5/26/2023             Telephone call with  wifeApoorva who verbalizes understanding and agrees to plan and who agrees to plan and repeated back plan correctly    Lab visit scheduled    Education provided:     Goal range and lab monitoring: goal range and significance of current result and Importance of following up at instructed interval    Contact [phone] with any changes, questions or concerns.     Plan made per [facility] anticoagulation protocol    Alejandra Naylor RN  Anticoagulation Clinic  5/22/2023    _______________________________________________________________________     Anticoagulation Episode Summary     Current INR goal:  2.0-3.0   TTR:  8.2 % (1.3 wk)   Target end date:  5/3/2024   Send INR reminders to:  [facility] [place]    Indications    Chronic atrial fibrillation (H) [I48.20]           Comments:           Anticoagulation Care Providers     Provider Role Specialty Phone number    Gary Serrano MD Referring Family Medicine [phone]

## 2023-05-23 ENCOUNTER — ORGAN (OUTPATIENT)
Dept: TRANSPLANT | Facility: CLINIC | Age: 68
End: 2023-05-23
Payer: COMMERCIAL

## 2023-05-23 DIAGNOSIS — Z76.82 AWAITING ORGAN TRANSPLANT: Primary | ICD-10-CM

## 2023-05-24 DIAGNOSIS — G47.33 OSA (OBSTRUCTIVE SLEEP APNEA): Primary | ICD-10-CM

## 2023-05-26 ENCOUNTER — LAB (OUTPATIENT)
Dept: LAB | Facility: CLINIC | Age: 68
End: 2023-05-26
Payer: COMMERCIAL

## 2023-05-26 ENCOUNTER — ANTICOAGULATION THERAPY VISIT (OUTPATIENT)
Dept: ANTICOAGULATION | Facility: CLINIC | Age: 68
End: 2023-05-26

## 2023-05-26 DIAGNOSIS — I48.20 CHRONIC ATRIAL FIBRILLATION (H): Primary | ICD-10-CM

## 2023-05-26 DIAGNOSIS — K70.30 ALCOHOLIC CIRRHOSIS (H): ICD-10-CM

## 2023-05-26 LAB
ALBUMIN SERPL BCG-MCNC: 3.1 G/DL (ref 3.5–5.2)
ALP SERPL-CCNC: 216 U/L (ref 40–129)
ALT SERPL W P-5'-P-CCNC: 45 U/L (ref 10–50)
ANION GAP SERPL CALCULATED.3IONS-SCNC: 12 MMOL/L (ref 7–15)
AST SERPL W P-5'-P-CCNC: 60 U/L (ref 10–50)
BILIRUB SERPL-MCNC: 0.5 MG/DL
BUN SERPL-MCNC: 23.3 MG/DL (ref 8–23)
CALCIUM SERPL-MCNC: 8.4 MG/DL (ref 8.8–10.2)
CHLORIDE SERPL-SCNC: 107 MMOL/L (ref 98–107)
CREAT SERPL-MCNC: 2.29 MG/DL (ref 0.67–1.17)
DEPRECATED HCO3 PLAS-SCNC: 22 MMOL/L (ref 22–29)
GFR SERPL CREATININE-BSD FRML MDRD: 31 ML/MIN/1.73M2
GLUCOSE SERPL-MCNC: 99 MG/DL (ref 70–99)
INR PPP: 1.71 (ref 0.85–1.15)
POTASSIUM SERPL-SCNC: 3.7 MMOL/L (ref 3.4–5.3)
PROT SERPL-MCNC: 5 G/DL (ref 6.4–8.3)
SODIUM SERPL-SCNC: 141 MMOL/L (ref 136–145)

## 2023-05-26 PROCEDURE — 80053 COMPREHEN METABOLIC PANEL: CPT

## 2023-05-26 PROCEDURE — 36415 COLL VENOUS BLD VENIPUNCTURE: CPT

## 2023-05-26 PROCEDURE — 85610 PROTHROMBIN TIME: CPT | Mod: QW

## 2023-05-26 NOTE — PROGRESS NOTES
ANTICOAGULATION MANAGEMENT     Damion Quinones 67 year old male is on warfarin with subtherapeutic INR result. (Goal INR 2.0-3.0)    Recent labs: (last 7 days)     05/26/23  0954   INR 1.71*       ASSESSMENT       Source(s): Chart Review and Patient/Caregiver Call       Warfarin doses taken: Warfarin taken as instructed    Diet: No new diet changes identified    Medication/supplement changes: done with ABX, continues taper of prednisone    New illness, injury, or hospitalization: No    Signs or symptoms of bleeding or clotting: No    Previous result: Therapeutic last visit; previously outside of goal range    Additional findings: still waiting for kidney/liver transplant         PLAN     Recommended plan for no diet, medication or health factor changes affecting INR     Dosing Instructions: booster dose then Increase your warfarin dose (12.5% change) with next INR on 5/31 at [facility]       Summary  As of 5/26/2023    Full warfarin instructions:  5/26: 4 mg; Otherwise 2 mg every Sun, Tue, Thu; 3 mg all other days   Next INR check:  5/31/2023             Telephone call with  wife, Apoorva who agrees to dosing plan and repeated back plan correctly-- per Apoorva, [facility] needs labs including INR on Monday to update MELD score (score dropped with subtherapeutic INR today). Informed patient's wife that [facility] is not open on Monday so will be unable to call with dosing instructions based on results; advised to continue current maintenance dose and [facility] will call Tuesday to follow-up.     Will check with [facility] labs    Education provided:     Contact [phone] with any changes, questions or concerns.     Plan made with [facility] Pharmacist Darlene Maharaj, RN  Anticoagulation Clinic  5/26/2023    _______________________________________________________________________     Anticoagulation Episode Summary     Current INR goal:  2.0-3.0   TTR:  12.8 % (1.9 wk)   Target end date:  5/3/2024   Send INR reminders to:  [facility] [place]     Indications    Chronic atrial fibrillation (H) [I48.20]           Comments:           Anticoagulation Care Providers     Provider Role Specialty Phone number    Gary Serrano MD Referring Family Medicine [phone]

## 2023-05-29 ENCOUNTER — TELEPHONE (OUTPATIENT)
Dept: TRANSPLANT | Facility: CLINIC | Age: 68
End: 2023-05-29
Payer: COMMERCIAL

## 2023-05-29 ENCOUNTER — LAB (OUTPATIENT)
Dept: LAB | Facility: CLINIC | Age: 68
End: 2023-05-29
Attending: INTERNAL MEDICINE
Payer: COMMERCIAL

## 2023-05-29 DIAGNOSIS — K70.30 ALCOHOLIC CIRRHOSIS (H): ICD-10-CM

## 2023-05-29 DIAGNOSIS — N18.5 STAGE 5 CHRONIC KIDNEY DISEASE NOT ON CHRONIC DIALYSIS (H): ICD-10-CM

## 2023-05-29 DIAGNOSIS — Z76.82 AWAITING ORGAN TRANSPLANT: ICD-10-CM

## 2023-05-29 DIAGNOSIS — Z76.82 AWAITING ORGAN TRANSPLANT: Primary | ICD-10-CM

## 2023-05-29 LAB
ALBUMIN SERPL BCG-MCNC: 2.7 G/DL (ref 3.5–5.2)
ALP SERPL-CCNC: 227 U/L (ref 40–129)
ALT SERPL W P-5'-P-CCNC: 46 U/L (ref 10–50)
ANION GAP SERPL CALCULATED.3IONS-SCNC: 9 MMOL/L (ref 7–15)
AST SERPL W P-5'-P-CCNC: 59 U/L (ref 10–50)
BILIRUB SERPL-MCNC: 0.5 MG/DL
BUN SERPL-MCNC: 30.5 MG/DL (ref 8–23)
CALCIUM SERPL-MCNC: 8.5 MG/DL (ref 8.8–10.2)
CHLORIDE SERPL-SCNC: 106 MMOL/L (ref 98–107)
CREAT SERPL-MCNC: 2.85 MG/DL (ref 0.67–1.17)
DEPRECATED HCO3 PLAS-SCNC: 25 MMOL/L (ref 22–29)
GFR SERPL CREATININE-BSD FRML MDRD: 24 ML/MIN/1.73M2
GLUCOSE SERPL-MCNC: 83 MG/DL (ref 70–99)
INR PPP: 2.45 (ref 0.85–1.15)
POTASSIUM SERPL-SCNC: 3.9 MMOL/L (ref 3.4–5.3)
PROT SERPL-MCNC: 5 G/DL (ref 6.4–8.3)
SODIUM SERPL-SCNC: 140 MMOL/L (ref 136–145)

## 2023-05-29 PROCEDURE — 85610 PROTHROMBIN TIME: CPT

## 2023-05-29 PROCEDURE — 80053 COMPREHEN METABOLIC PANEL: CPT

## 2023-05-29 PROCEDURE — 36415 COLL VENOUS BLD VENIPUNCTURE: CPT

## 2023-05-29 NOTE — TELEPHONE ENCOUNTER
[facility] CALL    [facility] lab requesting INR order to be updated.  2 times weekly INR ordered by Dr Poole  5/15/2023.    Order updated.    Debbie Peacock RN   Transplant Coordinator  [phone]

## 2023-05-30 ENCOUNTER — ANTICOAGULATION THERAPY VISIT (OUTPATIENT)
Dept: ANTICOAGULATION | Facility: CLINIC | Age: 68
End: 2023-05-30
Payer: COMMERCIAL

## 2023-05-30 DIAGNOSIS — I48.20 CHRONIC ATRIAL FIBRILLATION (H): Primary | ICD-10-CM

## 2023-05-30 NOTE — PROGRESS NOTES
ANTICOAGULATION MANAGEMENT     Damion Quinones 67 year old male is on warfarin with therapeutic INR result. (Goal INR 2.0-3.0)    Recent labs: (last 7 days)     05/29/23  1114   INR 2.45*       ASSESSMENT       Source(s): Chart Review and Patient/Caregiver Call       Warfarin doses taken: Warfarin taken as instructed    Diet: No new diet changes identified    Medication/supplement changes: None noted    New illness, injury, or hospitalization: No    Signs or symptoms of bleeding or clotting: No    Previous result: Therapeutic last visit; previously outside of goal range    Additional findings: new start on 5/3-- awaiting kidney/liver transplant, routine labs for MELD score updates       PLAN     Recommended plan for no diet, medication or health factor changes affecting INR     Dosing Instructions: Continue your current warfarin dose with next INR in 1 week       Summary  As of 5/30/2023    Full warfarin instructions:  2 mg every Sun, Tue, Thu; 3 mg all other days   Next INR check:  6/5/2023             Telephone call with  wifeApoorva who agrees to plan and repeated back plan correctly    Lab visit scheduled    Education provided:     Contact [phone] with any changes, questions or concerns.     Plan made per [facility] anticoagulation protocol    Koki Maharaj, RN  Anticoagulation Clinic  5/30/2023    _______________________________________________________________________     Anticoagulation Episode Summary     Current INR goal:  2.0-3.0   TTR:  21.7 % (2.3 wk)   Target end date:  5/3/2024   Send INR reminders to:  [facility] [place]    Indications    Chronic atrial fibrillation (H) [I48.20]           Comments:           Anticoagulation Care Providers     Provider Role Specialty Phone number    Gary Serrano MD Referring Family Medicine [phone]

## 2023-05-31 ENCOUNTER — TELEPHONE (OUTPATIENT)
Dept: FAMILY MEDICINE | Facility: CLINIC | Age: 68
End: 2023-05-31

## 2023-05-31 DIAGNOSIS — I82.90 DEEP VEIN THROMBOSIS (DVT) OF NON-EXTREMITY VEIN, UNSPECIFIED CHRONICITY: ICD-10-CM

## 2023-05-31 DIAGNOSIS — I48.20 CHRONIC ATRIAL FIBRILLATION (H): ICD-10-CM

## 2023-05-31 DIAGNOSIS — I48.0 PAF (PAROXYSMAL ATRIAL FIBRILLATION) (H): Primary | ICD-10-CM

## 2023-05-31 RX ORDER — WARFARIN SODIUM 1 MG/1
2-3 TABLET ORAL DAILY
Qty: 270 TABLET | Refills: 1 | Status: ON HOLD | OUTPATIENT
Start: 2023-05-31 | End: 2023-06-25

## 2023-05-31 NOTE — TELEPHONE ENCOUNTER
Called and spoke to patient and wife.  Continue same plan as noted in [facility] note from yesterday.  Manuel PADGETT

## 2023-05-31 NOTE — TELEPHONE ENCOUNTER
Routing refill request to provider for review/approval because:  Routing refill request to almas PADGETT        Last Written Prescription Date: 5/11/23  Last Fill Quantity: 60,  # refills: 0   Last office visit: 5/1/23    Future Office Visit:   Next 5 appointments (look out 90 days)    Jun 08, 2023  9:45 AM  (Arrive by 9:25 AM)  Provider Visit with Gary Serrano MD  [facility] ([facility] ) [address] [place] [id number] [phone]

## 2023-05-31 NOTE — TELEPHONE ENCOUNTER
ANTICOAGULATION MANAGEMENT:  Medication Refill    Anticoagulation Summary  As of 5/30/2023    Warfarin maintenance plan:  2 mg (1 mg x 2) every Sun, Tue, Thu; 3 mg (1 mg x 3) all other days   Next INR check:  6/5/2023   Target end date:  5/3/2024    Indications    Chronic atrial fibrillation (H) [I48.20]             Anticoagulation Care Providers     Provider Role Specialty Phone number    Gary Serrano MD Referring Family Medicine [phone]          Visit with referring provider/group within last year: Yes    [facility] referral signed within last year: Yes    Damion meets all criteria for refill (current [facility] referral, office visit with referring provider/group in last year, lab monitoring up to date or not exceeding 2 weeks overdue). Rx instructions and quantity supplied updated to match patient's current dosing plan. Warfarin 90 day supply with 1 refill granted per [facility] protocol     Koki Maharaj RN  Anticoagulation Clinic     Laps, needles and instruments count was reported as correct.

## 2023-05-31 NOTE — TELEPHONE ENCOUNTER
Reason for Call: Request for an order or referral:    Order or referral being requested: ANTICOAGULATION    Date needed: as soon as possible    Has the patient been seen by the PCP for this problem? Not Applicable    Additional comments: Needs to speak to INR Nurse regarding meds and scheduling around dialysis.    Phone number Patient can be reached at:  Cell number on file:    Telephone Information:   Mobile [phone]       Best Time:  Anytime    Can we leave a detailed message on this number?  YES    Call taken on 5/31/2023 at 3:07 PM by Jeri Clancy

## 2023-06-05 ENCOUNTER — HOSPITAL ENCOUNTER (INPATIENT)
Facility: CLINIC | Age: 68
LOS: 19 days | Discharge: NURSING, REHAB OR LONG-TERM CARE | End: 2023-06-25
Attending: INTERNAL MEDICINE | Admitting: TRANSPLANT SURGERY
Payer: COMMERCIAL

## 2023-06-05 ENCOUNTER — ORGAN (OUTPATIENT)
Dept: TRANSPLANT | Facility: CLINIC | Age: 68
End: 2023-06-05

## 2023-06-05 ENCOUNTER — ANESTHESIA EVENT (OUTPATIENT)
Dept: SURGERY | Facility: CLINIC | Age: 68
End: 2023-06-05
Payer: COMMERCIAL

## 2023-06-05 ENCOUNTER — ANESTHESIA (OUTPATIENT)
Dept: SURGERY | Facility: CLINIC | Age: 68
End: 2023-06-05
Payer: COMMERCIAL

## 2023-06-05 ENCOUNTER — APPOINTMENT (OUTPATIENT)
Dept: GENERAL RADIOLOGY | Facility: CLINIC | Age: 68
End: 2023-06-05
Attending: PHYSICIAN ASSISTANT
Payer: COMMERCIAL

## 2023-06-05 ENCOUNTER — HOSPITAL ENCOUNTER (INPATIENT)
Age: 68
Setting detail: SURGERY ADMIT
End: 2023-06-05
Attending: TRANSPLANT SURGERY | Admitting: TRANSPLANT SURGERY
Payer: COMMERCIAL

## 2023-06-05 ENCOUNTER — APPOINTMENT (OUTPATIENT)
Dept: CARDIOLOGY | Facility: CLINIC | Age: 68
End: 2023-06-05
Attending: PHYSICIAN ASSISTANT
Payer: COMMERCIAL

## 2023-06-05 DIAGNOSIS — Z76.82 AWAITING ORGAN TRANSPLANT: Primary | ICD-10-CM

## 2023-06-05 DIAGNOSIS — Z94.4 LIVER TRANSPLANT RECIPIENT (H): Primary | ICD-10-CM

## 2023-06-05 DIAGNOSIS — M10.9 ACUTE GOUTY ARTHRITIS: ICD-10-CM

## 2023-06-05 DIAGNOSIS — K91.89 ILEUS, POSTOPERATIVE (H): ICD-10-CM

## 2023-06-05 DIAGNOSIS — Z79.2 ADMINISTRATION OF LONG-TERM PROPHYLACTIC ANTIBIOTICS: ICD-10-CM

## 2023-06-05 DIAGNOSIS — I48.91 ATRIAL FIBRILLATION WITH RAPID VENTRICULAR RESPONSE (H): ICD-10-CM

## 2023-06-05 DIAGNOSIS — K56.7 ILEUS, POSTOPERATIVE (H): ICD-10-CM

## 2023-06-05 LAB
ABO/RH(D): NORMAL
ALBUMIN SERPL BCG-MCNC: 2.9 G/DL (ref 3.5–5.2)
ALBUMIN UR-MCNC: 10 MG/DL
ALP SERPL-CCNC: 207 U/L (ref 40–129)
ALT SERPL W P-5'-P-CCNC: 84 U/L (ref 10–50)
AMYLASE SERPL-CCNC: 153 U/L (ref 28–100)
ANION GAP SERPL CALCULATED.3IONS-SCNC: 13 MMOL/L (ref 7–15)
ANTIBODY SCREEN: NEGATIVE
APPEARANCE UR: CLEAR
APTT PPP: 41 SECONDS (ref 22–38)
AST SERPL W P-5'-P-CCNC: ABNORMAL U/L
BASOPHILS # BLD AUTO: 0 10E3/UL (ref 0–0.2)
BASOPHILS NFR BLD AUTO: 0 %
BILIRUB SERPL-MCNC: 0.6 MG/DL
BILIRUB UR QL STRIP: NEGATIVE
BLD PROD TYP BPU: NORMAL
BLOOD COMPONENT TYPE: NORMAL
BUN SERPL-MCNC: 36.9 MG/DL (ref 8–23)
CALCIUM SERPL-MCNC: 8.7 MG/DL (ref 8.8–10.2)
CHLORIDE SERPL-SCNC: 105 MMOL/L (ref 98–107)
CODING SYSTEM: NORMAL
COLOR UR AUTO: YELLOW
CREAT SERPL-MCNC: 3.85 MG/DL (ref 0.67–1.17)
CROSSMATCH: NORMAL
DEPRECATED HCO3 PLAS-SCNC: 21 MMOL/L (ref 22–29)
EOSINOPHIL # BLD AUTO: 0.1 10E3/UL (ref 0–0.7)
EOSINOPHIL NFR BLD AUTO: 1 %
ERYTHROCYTE [DISTWIDTH] IN BLOOD BY AUTOMATED COUNT: 17.7 % (ref 10–15)
FIBRINOGEN PPP-MCNC: 439 MG/DL (ref 170–490)
GFR SERPL CREATININE-BSD FRML MDRD: 17 ML/MIN/1.73M2
GLUCOSE SERPL-MCNC: 101 MG/DL (ref 70–99)
GLUCOSE UR STRIP-MCNC: NEGATIVE MG/DL
HCT VFR BLD AUTO: 34.9 % (ref 40–53)
HGB BLD-MCNC: 10.8 G/DL (ref 13.3–17.7)
HGB UR QL STRIP: ABNORMAL
IMM GRANULOCYTES # BLD: 0.1 10E3/UL
IMM GRANULOCYTES NFR BLD: 1 %
INR PPP: 3.54 (ref 0.85–1.15)
ISSUE DATE AND TIME: NORMAL
KETONES UR STRIP-MCNC: NEGATIVE MG/DL
LEUKOCYTE ESTERASE UR QL STRIP: NEGATIVE
LVEF ECHO: NORMAL
LYMPHOCYTES # BLD AUTO: 0.6 10E3/UL (ref 0.8–5.3)
LYMPHOCYTES NFR BLD AUTO: 6 %
MAGNESIUM SERPL-MCNC: 2.2 MG/DL (ref 1.7–2.3)
MCH RBC QN AUTO: 30.8 PG (ref 26.5–33)
MCHC RBC AUTO-ENTMCNC: 30.9 G/DL (ref 31.5–36.5)
MCV RBC AUTO: 99 FL (ref 78–100)
MONOCYTES # BLD AUTO: 0.7 10E3/UL (ref 0–1.3)
MONOCYTES NFR BLD AUTO: 8 %
MUCOUS THREADS #/AREA URNS LPF: PRESENT /LPF
NEUTROPHILS # BLD AUTO: 7.7 10E3/UL (ref 1.6–8.3)
NEUTROPHILS NFR BLD AUTO: 84 %
NITRATE UR QL: NEGATIVE
NRBC # BLD AUTO: 0 10E3/UL
NRBC BLD AUTO-RTO: 0 /100
NT-PROBNP SERPL-MCNC: 1298 PG/ML (ref 0–900)
PH UR STRIP: 5.5 [PH] (ref 5–7)
PHOSPHATE SERPL-MCNC: 3.4 MG/DL (ref 2.5–4.5)
PLATELET # BLD AUTO: 150 10E3/UL (ref 150–450)
POTASSIUM SERPL-SCNC: 4.1 MMOL/L (ref 3.4–5.3)
PROT SERPL-MCNC: 5.1 G/DL (ref 6.4–8.3)
RBC # BLD AUTO: 3.51 10E6/UL (ref 4.4–5.9)
RBC URINE: 2 /HPF
SARS-COV-2 RNA RESP QL NAA+PROBE: NEGATIVE
SODIUM SERPL-SCNC: 139 MMOL/L (ref 136–145)
SP GR UR STRIP: 1.01 (ref 1–1.03)
SPECIMEN EXPIRATION DATE: NORMAL
UNIT ABO/RH: NORMAL
UNIT NUMBER: NORMAL
UNIT STATUS: NORMAL
UNIT TYPE ISBT: 5100
UROBILINOGEN UR STRIP-MCNC: NORMAL MG/DL
WBC # BLD AUTO: 9.2 10E3/UL (ref 4–11)
WBC URINE: 7 /HPF

## 2023-06-05 PROCEDURE — 87077 CULTURE AEROBIC IDENTIFY: CPT | Performed by: PHYSICIAN ASSISTANT

## 2023-06-05 PROCEDURE — 87340 HEPATITIS B SURFACE AG IA: CPT | Performed by: PHYSICIAN ASSISTANT

## 2023-06-05 PROCEDURE — 81001 URINALYSIS AUTO W/SCOPE: CPT | Performed by: PHYSICIAN ASSISTANT

## 2023-06-05 PROCEDURE — 86825 HLA X-MATH NON-CYTOTOXIC: CPT | Performed by: PHYSICIAN ASSISTANT

## 2023-06-05 PROCEDURE — 86644 CMV ANTIBODY: CPT | Performed by: PHYSICIAN ASSISTANT

## 2023-06-05 PROCEDURE — 36415 COLL VENOUS BLD VENIPUNCTURE: CPT | Performed by: PHYSICIAN ASSISTANT

## 2023-06-05 PROCEDURE — 85018 HEMOGLOBIN: CPT | Performed by: PHYSICIAN ASSISTANT

## 2023-06-05 PROCEDURE — 83735 ASSAY OF MAGNESIUM: CPT | Performed by: PHYSICIAN ASSISTANT

## 2023-06-05 PROCEDURE — 83880 ASSAY OF NATRIURETIC PEPTIDE: CPT | Performed by: PHYSICIAN ASSISTANT

## 2023-06-05 PROCEDURE — 71046 X-RAY EXAM CHEST 2 VIEWS: CPT | Mod: 26 | Performed by: RADIOLOGY

## 2023-06-05 PROCEDURE — 86833 HLA CLASS II HIGH DEFIN QUAL: CPT | Performed by: PHYSICIAN ASSISTANT

## 2023-06-05 PROCEDURE — 999N000248 HC STATISTIC IV INSERT WITH US BY RN

## 2023-06-05 PROCEDURE — 85610 PROTHROMBIN TIME: CPT | Performed by: PHYSICIAN ASSISTANT

## 2023-06-05 PROCEDURE — 85730 THROMBOPLASTIN TIME PARTIAL: CPT | Performed by: PHYSICIAN ASSISTANT

## 2023-06-05 PROCEDURE — 86832 HLA CLASS I HIGH DEFIN QUAL: CPT | Performed by: PHYSICIAN ASSISTANT

## 2023-06-05 PROCEDURE — 85384 FIBRINOGEN ACTIVITY: CPT | Performed by: PHYSICIAN ASSISTANT

## 2023-06-05 PROCEDURE — 93005 ELECTROCARDIOGRAM TRACING: CPT

## 2023-06-05 PROCEDURE — 86803 HEPATITIS C AB TEST: CPT | Performed by: PHYSICIAN ASSISTANT

## 2023-06-05 PROCEDURE — 86923 COMPATIBILITY TEST ELECTRIC: CPT

## 2023-06-05 PROCEDURE — 87521 HEPATITIS C PROBE&RVRS TRNSC: CPT | Performed by: PHYSICIAN ASSISTANT

## 2023-06-05 PROCEDURE — 87635 SARS-COV-2 COVID-19 AMP PRB: CPT | Performed by: PHYSICIAN ASSISTANT

## 2023-06-05 PROCEDURE — 86704 HEP B CORE ANTIBODY TOTAL: CPT | Performed by: PHYSICIAN ASSISTANT

## 2023-06-05 PROCEDURE — 86645 CMV ANTIBODY IGM: CPT | Performed by: PHYSICIAN ASSISTANT

## 2023-06-05 PROCEDURE — 86901 BLOOD TYPING SEROLOGIC RH(D): CPT | Performed by: PHYSICIAN ASSISTANT

## 2023-06-05 PROCEDURE — 99222 1ST HOSP IP/OBS MODERATE 55: CPT | Mod: 25 | Performed by: INTERNAL MEDICINE

## 2023-06-05 PROCEDURE — 93306 TTE W/DOPPLER COMPLETE: CPT

## 2023-06-05 PROCEDURE — 86665 EPSTEIN-BARR CAPSID VCA: CPT | Performed by: PHYSICIAN ASSISTANT

## 2023-06-05 PROCEDURE — 71046 X-RAY EXAM CHEST 2 VIEWS: CPT

## 2023-06-05 PROCEDURE — 87389 HIV-1 AG W/HIV-1&-2 AB AG IA: CPT | Performed by: PHYSICIAN ASSISTANT

## 2023-06-05 PROCEDURE — 84155 ASSAY OF PROTEIN SERUM: CPT | Performed by: PHYSICIAN ASSISTANT

## 2023-06-05 PROCEDURE — 93306 TTE W/DOPPLER COMPLETE: CPT | Mod: 26 | Performed by: INTERNAL MEDICINE

## 2023-06-05 PROCEDURE — 82150 ASSAY OF AMYLASE: CPT | Performed by: PHYSICIAN ASSISTANT

## 2023-06-05 PROCEDURE — 86706 HEP B SURFACE ANTIBODY: CPT | Performed by: PHYSICIAN ASSISTANT

## 2023-06-05 PROCEDURE — 99221 1ST HOSP IP/OBS SF/LOW 40: CPT | Performed by: TRANSPLANT SURGERY

## 2023-06-05 PROCEDURE — 87081 CULTURE SCREEN ONLY: CPT | Performed by: PHYSICIAN ASSISTANT

## 2023-06-05 PROCEDURE — 84100 ASSAY OF PHOSPHORUS: CPT | Performed by: PHYSICIAN ASSISTANT

## 2023-06-05 PROCEDURE — 85048 AUTOMATED LEUKOCYTE COUNT: CPT | Performed by: PHYSICIAN ASSISTANT

## 2023-06-05 RX ORDER — PIPERACILLIN SODIUM, TAZOBACTAM SODIUM 3; .375 G/15ML; G/15ML
3.38 INJECTION, POWDER, LYOPHILIZED, FOR SOLUTION INTRAVENOUS
Status: CANCELLED | OUTPATIENT
Start: 2023-06-05

## 2023-06-05 RX ORDER — NOREPINEPHRINE BITARTRATE 0.06 MG/ML
.01-.6 INJECTION, SOLUTION INTRAVENOUS CONTINUOUS
Status: CANCELLED | OUTPATIENT
Start: 2023-06-06

## 2023-06-05 RX ORDER — PIPERACILLIN SODIUM, TAZOBACTAM SODIUM 3; .375 G/15ML; G/15ML
3.38 INJECTION, POWDER, LYOPHILIZED, FOR SOLUTION INTRAVENOUS ONCE
Status: CANCELLED | OUTPATIENT
Start: 2023-06-05 | End: 2023-06-05

## 2023-06-05 RX ORDER — PIPERACILLIN SODIUM, TAZOBACTAM SODIUM 3; .375 G/15ML; G/15ML
3.38 INJECTION, POWDER, LYOPHILIZED, FOR SOLUTION INTRAVENOUS
Status: DISCONTINUED | OUTPATIENT
Start: 2023-06-05 | End: 2023-06-06 | Stop reason: HOSPADM

## 2023-06-05 RX ORDER — LIDOCAINE 40 MG/G
CREAM TOPICAL
Status: DISCONTINUED | OUTPATIENT
Start: 2023-06-05 | End: 2023-06-06 | Stop reason: HOSPADM

## 2023-06-05 RX ORDER — LIDOCAINE 40 MG/G
CREAM TOPICAL
Status: CANCELLED | OUTPATIENT
Start: 2023-06-05

## 2023-06-05 RX ORDER — FLUCONAZOLE 2 MG/ML
400 INJECTION, SOLUTION INTRAVENOUS ONCE
Status: CANCELLED | OUTPATIENT
Start: 2023-06-05 | End: 2023-06-05

## 2023-06-05 RX ORDER — CALCIUM CHLORIDE, MAGNESIUM CHLORIDE, DEXTROSE MONOHYDRATE, LACTIC ACID, SODIUM CHLORIDE, SODIUM BICARBONATE AND POTASSIUM CHLORIDE 5.15; 2.03; 22; 5.4; 6.46; 3.09; .157 G/L; G/L; G/L; G/L; G/L; G/L; G/L
12.5 INJECTION INTRAVENOUS CONTINUOUS
Status: DISCONTINUED | OUTPATIENT
Start: 2023-06-05 | End: 2023-06-06

## 2023-06-05 RX ORDER — ZINC GLUCONATE 50 MG
50 TABLET ORAL DAILY
Status: ON HOLD | COMMUNITY
End: 2023-06-25

## 2023-06-05 RX ORDER — PIPERACILLIN SODIUM, TAZOBACTAM SODIUM 3; .375 G/15ML; G/15ML
3.38 INJECTION, POWDER, LYOPHILIZED, FOR SOLUTION INTRAVENOUS ONCE
Status: COMPLETED | OUTPATIENT
Start: 2023-06-05 | End: 2023-06-06

## 2023-06-05 RX ORDER — CALCIUM CHLORIDE, MAGNESIUM CHLORIDE, DEXTROSE MONOHYDRATE, LACTIC ACID, SODIUM CHLORIDE, SODIUM BICARBONATE AND POTASSIUM CHLORIDE 5.15; 2.03; 22; 5.4; 6.46; 3.09; .157 G/L; G/L; G/L; G/L; G/L; G/L; G/L
INJECTION INTRAVENOUS CONTINUOUS
Status: DISCONTINUED | OUTPATIENT
Start: 2023-06-05 | End: 2023-06-06

## 2023-06-05 RX ORDER — FLUCONAZOLE 2 MG/ML
400 INJECTION, SOLUTION INTRAVENOUS ONCE
Status: COMPLETED | OUTPATIENT
Start: 2023-06-05 | End: 2023-06-06

## 2023-06-05 ASSESSMENT — ACTIVITIES OF DAILY LIVING (ADL)
DESCRIBE_HEARING_LOSS: BILATERAL HEARING LOSS
TOILETING_ISSUES: NO
VISION_MANAGEMENT: GLASSES
PATIENT'S_PREFERRED_MEANS_OF_COMMUNICATION: VERBAL
DIFFICULTY_COMMUNICATING: NO
FALL_HISTORY_WITHIN_LAST_SIX_MONTHS: NO
ADLS_ACUITY_SCORE: 35
WEAR_GLASSES_OR_BLIND: YES
CHANGE_IN_FUNCTIONAL_STATUS_SINCE_ONSET_OF_CURRENT_ILLNESS/INJURY: NO
CONCENTRATING,_REMEMBERING_OR_MAKING_DECISIONS_DIFFICULTY: NO
ADLS_ACUITY_SCORE: 22
EQUIPMENT_CURRENTLY_USED_AT_HOME: WALKER, STANDARD
ADLS_ACUITY_SCORE: 22
WALKING_OR_CLIMBING_STAIRS_DIFFICULTY: NO
ADLS_ACUITY_SCORE: 22
DIFFICULTY_EATING/SWALLOWING: NO
HEARING_DIFFICULTY_OR_DEAF: YES
WERE_AUXILIARY_AIDS_OFFERED?: NO
ADLS_ACUITY_SCORE: 22
DRESSING/BATHING_DIFFICULTY: NO
THE_FOLLOWING_AIDS_WERE_PROVIDED;: POCKET TALKER
DOING_ERRANDS_INDEPENDENTLY_DIFFICULTY: NO

## 2023-06-05 ASSESSMENT — ENCOUNTER SYMPTOMS: DYSRHYTHMIAS: 1

## 2023-06-05 ASSESSMENT — LIFESTYLE VARIABLES: TOBACCO_USE: 0

## 2023-06-05 ASSESSMENT — COPD QUESTIONNAIRES: COPD: 0

## 2023-06-05 NOTE — PROGRESS NOTES
SPIRITUAL HEALTH SERVICES  SPIRITUAL ASSESSMENT Progress Note  [facility] ([place]) 7A  On-Call    REASON FOR [facility] CARE: Admission request    Brief interaction with Casey, his wife (Apoorva), and daughter (Nidhi) talking about the circumstances leading to Casey's expected liver transplant surgery tomorrow. His [employer] kaushal was affirmed as a source of support and coping. Prayer was shared.     PLAN: I have no plan for follow up today.    Michael Shen, MPH, MDiv, [facility]  Staff   Pager [phone]  [facility] remains available 24/7 for emergent requests/referrals, either by having the switchboard page the on-call  or by entering an ASAP/STAT consult in Epic (this will also page the on-call ).

## 2023-06-05 NOTE — PROGRESS NOTES
"CLINICAL NUTRITION SERVICES - ASSESSMENT NOTE     Nutrition Prescription      Malnutrition Status:    Unable to determine due to incomplete information    Recommendations already ordered by Registered Dietitian (RD):  Weigh patient     Future/Additional Recommendations:  Tube Feeding Recommendations: Novasource Renal (or equivalent) @ 45 ml/hr (1080 ml) + 1 pkt Prosource TF daily provides 2240 kcal (29 kcal/kg), 118 g pro (1.5 g/kg), 198 g CHO, 774 ml free water, and 0 g fiber daily.     Upon diet advancement, start Nepro vs Ensure (pending renal status) and calorie counts.     Adjust DW pending ongoing weight trends     REASON FOR ASSESSMENT  Damion Quinones is a/an 67 year old male assessed by the dietitian for Provider Order - Pre-Op Liver Transplant    NUTRITION HISTORY  Patient and family report good appetite/intake.  He generally eats a low sodium/renal, high protein diet.  He enjoys meat, eggs, PB.  He has been drinking 1 Nepro daily.      Yet, they note his appetite and taste for certain foods hasn't been what it used to be.     CURRENT NUTRITION ORDERS  Diet: NPO    LABS  Labs reviewed    MEDICATIONS  Medications reviewed    ANTHROPOMETRICS  Height: 5'8\"  Most Recent Weight: 103.4 kg (228#) - 5/19/23     IBW: 70 kg  BMI: Obesity Grade I BMI 30-34.9  Weight History:  Pt denies weight loss, notes that he is up currently with fluid.  Reports UBW of 212-215#.   Wt Readings from Last 15 Encounters:   05/19/23 103.4 kg (228 lb)   05/17/23 104.3 kg (230 lb)   05/11/23 99.8 kg (220 lb)   05/10/23 106.9 kg (235 lb 9.6 oz)   05/01/23 102 kg (224 lb 12.8 oz)   04/27/23 98.9 kg (218 lb)   04/19/23 100.8 kg (222 lb 5 oz)   04/10/23 99 kg (218 lb 3.2 oz)   03/21/23 101.1 kg (222 lb 14.4 oz)   03/09/23 98.4 kg (217 lb)   03/03/23 99.7 kg (219 lb 11.2 oz)   03/01/23 99.8 kg (220 lb)   02/17/23 99.8 kg (220 lb)   02/09/23 96.5 kg (212 lb 11.9 oz)   02/09/23 96.2 kg (212 lb)   Dosing Weight: 78 kg (adj wt based on IBW of 70 " kg and actual wt of 103.4 kg)    ASSESSED NUTRITION NEEDS  Estimated Energy Needs: 0427-7287 kcals/day (25-30kcals/kg)  Justification: Increased needs s/p potential liver tx  Estimated Protein Needs: 117-156 grams protein/day (1.5 - 2 grams of pro/kg)  Justification: Increased needs s/p potential liver tx  Estimated Fluid Needs: per MD pending fluid status    PHYSICAL FINDINGS  See malnutrition section below.    MALNUTRITION  % Intake: No decreased intake noted  % Weight Loss: None noted, difficult to assess d/t fluid status  Subcutaneous Fat Loss: Unable to assess - pt fully clothed with recent admission, pt denies  Muscle Loss: Unable to assess - pt fully clothed with recent admission.  Pt denies any muscle loss.  Fluid Accumulation/Edema: Unable to assess  Hand : (used non-dominant hand as pt has broken pinky finger with dominant hand). L = 22 kg; measurably reduced  Malnutrition Diagnosis: Unable to determine due to incomplete information    NUTRITION DIAGNOSIS  Predicted inadequate nutrient intake (pro) related to increased needs s/p liver transplant    INTERVENTIONS  Implementation  Nutrition Education: Provided education on RD role, brief overview of diet post-transplant, nutrition plan of care following liver transplant including: potential for FT, need for increased protein intake (oral supplements) and calorie counts.       Provided renal diet menu.  Entered supplement order so patient can order Nepro vs Special K protein bar for dinner.      Goals  Diet adv v nutrition support within 2-3 days.     Monitoring/Evaluation  Progress toward goals will be monitored and evaluated per protocol.  Lizbeth Powell, MS, RD, LD, CCTD, CNSC  7A and 7B beds [phone], pager [phone]  Weekend pager [phone]

## 2023-06-05 NOTE — CONSULTS
[facility]  Transplant Nephrology Consult  Date of Admission:  6/5/2023  Today's Date: 06/07/2023  Requesting physician: Chad Clifton MD    Recommendations:  - Continue on CRRT. May use pressors to keep fluid removal I=O.  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 Remove 4-6 weeks post transplant.   -Pt taken back to OR on 6/7/23 due to lack of blood flow to the kidney transplant but intra op U/S normal.     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     # Immunosuppression: Tacrolimus immediate release (goal 8-10), Mycophenolate mofetil (dose 750 mg every 12 hours) and Methylprednisolone (dose taper)    - Induction with Recent Transplant:  Per Liver/Kidney Tx Protocol   - Changes: No. Wean pred down to 5mg daily and then would attempt to wean further but would get leisa stim prior to starting because of length of time he has been on chronic prednisone    # Infection Prophylaxis:   - PJP: Sulfa/TMP (Bactrim)  - CMV: Valganciclovir (Valcyte), CMV IgG Ab positive  - Thrush: Micafungin (Mycamine)  - Fungal: Micafungin (Mycamine)    # History of C.diff:   -Consider PO vanc with abx    # ANCA vasculitis:   -S/p Rituximab x2   -Obtain intermittent U/A     # Paroxysmal A-Fib: On coumadin and metoprolol ER 25 mg daily PTA. Consider switching to eliquis when  kidney and liver are functioning    # Hypotension: Hypotensive;  Goal BP: MAP > 65   - Volume status: Total body volume up, but intravascularly hypovolemic  EDW ~ 96.4 kg (on HD)   - Changes: Yes - pressors to keep MAP>65    # Elevated Blood Glucose: Glucose generally running ~ 160s-200   - Management as per primary team.  On insulin gtt.    # Anemia in Chronic Renal Disease and acute blood loss: Hgb: Decreased      FEDERICO: No   - Iron studies: Unknown at this time, but checked with dialysis    # Mineral Bone Disorder:   - Secondary renal hyperparathyroidism; PTH level: Minimally elevated ( pg/ml)        On treatment: None  - Vitamin D; level: Not checked recently, but was normal last check        On supplement: No  - Calcium; level: Normal when corrected for low alb       On supplement: No  - Phosphorus; level: High  4.7      On supplement: No    # Electrolytes:   - Potassium; level: Normal        On supplement: No  - Magnesium; level: Normal        On supplement: No, modulate with CRRT.  - Bicarbonate; level: Low 17       On supplement: No  - Sodium; level: Normal    # Gout:    - On prednisone 10 mg PO daily PTA. Wean prednisone down to 5mg daily and then will consider cortisol stimulation test    # Transplant History:  Etiology of Kidney Failure: IgA nephropathy and ANCA vasculitis  Tx: Liver Tx (SLK)  Transplant: N/A  Significant changes in immunosuppression: None  Significant transplant-related complications: None    Recommendations were communicated to the primary team verbally.    Seen and discussed with JADE Morrissey CNP  Pager: [phone]      Physician Attestation     I saw and evaluated Damion Quinones as part of a shared APRN/PA visit.     I personally reviewed the vital signs, medications, labs and imaging.    I personally performed the substantive portion of the medical decision making for this visit - please see the GEORGE's documentation for full details.    Key management  decisions made by me and carried out under my direction: Continue CRRT, I=O with pressors to keep MAP>65 if necessary. Plan to wean pred down to 5mg daily and consider leisa stim once down to 5mg every other day.     I personally performed the substantive portion of the history for this visit - please see the GEORGE's documentation for full details.  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  He is now s/p simultaneous liver/kidney transplant with ureteral stent on 6/6/23.  The op note states the kidney was placed intra-abdominal.  His creatinine today of 2.76 is down from 4.50 yesterday.  He received CRRT both intraop and post HD.  Urine output was 540 over the last three shifts (only 160 ml from midnight to 7AM.  Transaminases and total bilirubin have increased since yesterday.  Pt returned to the OR overnight d/t concern for poor flow to kidney and was evaluated by surgical team. PRA not reported and final crossmatch pending. Induction was with rATG. Receiving SLK induction protocol.    Bicarb level low (CO2 17).  SBP 90s-110s ovneright.  Required norepinephrine and vasopressin gtt overnight to maintain BP.  CVP 9 this morning.  Afebrile.    Review of Systems    Review of systems not obtained due to patient factors - intubation and sedation    Past Medical History    I have reviewed this patient's medical history and updated it with pertinent information if needed.   Past Medical History:   Diagnosis Date     Alcoholic cirrhosis of liver with ascites (H) 10/11/2019     C. difficile  colitis      Coronary artery disease     [facility] 4/2023 - complete occlusion of RCA     ESRD (end stage renal disease) on dialysis (H)      History of hemochromatosis 10/11/2019     Hypertension      Obesity      PAF (paroxysmal atrial fibrillation) (H)      Psoriatic arthritis (H)        Past Surgical History   I have reviewed this patient's surgical history and updated it with pertinent information if needed.  Past Surgical History:   Procedure Laterality Date     APPENDECTOMY      Removed at 18 Years Old      COLONOSCOPY      2014 at [facility].      CV CORONARY ANGIOGRAM N/A 4/27/2023    Procedure: Coronary Angiogram;  Surgeon: Pablo Araujo MD;  Location: [facility] [place] CARDIAC CATH LAB     EYE SURGERY Bilateral     Cataract     HERNIA REPAIR      History of bilateral inguinal hernia repair: 10/28/2014. Open hernia repair: 10/2017. Abdominal wound exploration and debridement 12/27/2017     INSERT SHUNT PORTAL TRANSJUGULAR INTRAHEPTIC  09/26/2022     IR CVC TUNNEL PLACEMENT > 5 YRS OF AGE  01/26/2023       Family History   I have reviewed this patient's family history and updated it with pertinent information if needed.   Family History   Problem Relation Age of Onset     Lung Cancer Mother      Colon Cancer Father      Lung Cancer Brother      Heart Failure Brother      Kidney Disease Brother        Social History   I have reviewed this patient's social history and updated it with pertinent information if needed. Damion Quinones  reports that he has never smoked. He has never been exposed to tobacco smoke. He has never used smokeless tobacco. He reports that he does not currently use alcohol. He reports that he does not currently use drugs.    Allergies   No Known Allergies  Prior to Admission Medications     albumin human  50 g Irrigation Once     calcium chloride  1 g Intravenous Q1H     fluconazole  400 mg Intravenous Once     methylPREDNISolone  1,000 mg Intravenous Once     piperacillin-tazobactam   3.375 g Intravenous Once     sodium chloride (PF)  3 mL Intracatheter Q8H         Physical Exam   Temp  Av.3  F (36.8  C)  Min: 98.3  F (36.8  C)  Max: 98.3  F (36.8  C)      Pulse  Av.3  Min: 82  Max: 98 Resp  Av  Min: 18  Max: 18  SpO2  Av.3 %  Min: 98 %  Max: 100 %     BP (!) 132/97 (BP Location: Right arm, Patient Position: Sitting)   Pulse 89   Temp 98.3  F (36.8  C) (Oral)   Resp 18     Admit       GENERAL APPEARANCE: intubated and sedated  RESP: lungs clear to auscultation - no rales, rhonchi or wheezes  CV: regular rhythm, normal rate, no rub, no murmur  EDEMA: 2+ LE edema bilaterally  ABDOMEN: soft, distended, MAE drain, wound vac dressing covering surgical site.  MS: extremities normal - no gross deformities noted, no evidence of inflammation in joints, no muscle tenderness  SKIN: no rash  DIALYSIS ACCESS:  Left IJ tunneled catheter    Data   CMP  Recent Labs   Lab 23  0619 23  0449 23  0337 23  0330 23  0131 23  0028 23  2315 23  2213 23  2108 23  1208 23  0547 23  1605   NA  --   --   --  138  --  137 136  --  142   < > 138 139   POTASSIUM  --   --   --  4.4  --  3.7 3.7  --  5.0   < > 4.2 4.1   CHLORIDE  --   --   --  105  --   --   --   --  107  --  104 105   CO2  --   --   --  17*  --   --   --   --  20*  --  22 21*   ANIONGAP  --   --   --  16*  --   --   --   --  15  --  12 13   * 166* 170* 167*   < > 165* 177*   < > 195*   < > 82 101*   BUN  --   --   --  32.0*  --   --   --   --  32.6*  --  50.9* 36.9*   CR  --   --   --  2.76*  --   --   --   --  2.76*  --  4.50* 3.85*   GFRESTIMATED  --   --   --  25*  --   --   --   --  25*  --  14* 17*   NAZARIO  --   --   --  7.6*  --   --   --   --  8.6*  --  8.8 8.7*   MAG  --   --   --  2.1  --   --   --   --   --   --  2.3 2.2   PHOS  --   --   --  4.7*  --   --   --   --   --   --  4.6* 3.4   PROTTOTAL  --   --   --  3.4*  --   --   --   --  3.4*  --  5.1*  5.1*   ALBUMIN  --   --   --  2.0*  --   --   --   --  2.1*  --  2.7* 2.9*   BILITOTAL  --   --   --  1.6*  --   --   --   --  1.7*  --  0.6 0.6   ALKPHOS  --   --   --  81  --   --   --   --  99  --  203* 207*   AST  --   --   --  1,834*  --   --   --   --  1,276*  --  157*  --    ALT  --   --   --  654*  --   --   --   --  624*  --  76* 84*    < > = values in this interval not displayed.     CBC  Recent Labs   Lab 06/07/23 0330 06/07/23  0028 06/06/23  2315 06/06/23 2108 06/06/23  1633 06/06/23  1528 06/06/23  1208 06/06/23  0547   HGB 8.5* 8.6* 9.2* 9.1*   < > 8.4*   < > 11.6*   WBC 18.2*  --   --  12.3*  --  10.8  --  12.1*   RBC 2.76*  --   --  3.02*  --  2.77*  --  3.80*   HCT 26.0*  --   --  28.1*  --  26.2*  --  37.3*   MCV 94  --   --  93  --  95  --  98   MCH 30.8  --   --  30.1  --  30.3  --  30.5   MCHC 32.7  --   --  32.4  --  32.1  --  31.1*   RDW 18.2*  --   --  17.7*  --  17.0*  --  17.5*   *  --   --  114*  --  74*  --  162    < > = values in this interval not displayed.     INR  Recent Labs   Lab 06/07/23 0330 06/06/23 2108 06/06/23  1627 06/06/23  1528 06/06/23  0547 06/05/23  1605   INR 2.02* 2.17* 4.30* 5.17*   < > 3.54*   PTT 40* 42*  --  64*  --  41*    < > = values in this interval not displayed.     ABG  Recent Labs   Lab 06/07/23  0341 06/07/23  0330 06/07/23  0028 06/06/23  2315 06/06/23  2223 06/06/23  2109   PH 7.39  --  7.33* 7.35  --  7.36   PCO2 33*  --  40 39  --  38   PO2 171*  --  85 198*  --  211*   HCO3 20*  --  21 21  --  21   O2PER 50 50 60.0 63.0   < > 50    < > = values in this interval not displayed.      Urine Studies  Recent Labs   Lab Test 06/05/23  1620 04/07/23  1246 02/13/23  0743 01/23/23  0828   COLOR Yellow Yellow Yellow Light Yellow   APPEARANCE Clear Clear Clear Clear   URINEGLC Negative Negative Negative Negative   URINEBILI Negative Negative Negative Negative   URINEKETONE Negative Negative Negative Negative   SG 1.012 1.011 1.014 1.013   UBLD  Moderate* Moderate* Negative Negative   URINEPH 5.5 5.5 5.0 6.0   PROTEIN 10* 10* 10* 20*   NITRITE Negative Negative Negative Negative   LEUKEST Negative Negative Negative Negative   RBCU 2 5* 1 <1   WBCU 7* 2 2 8*     No lab results found.  PTH  Recent Labs   Lab Test 05/19/23  0956   PTHI 67*     Iron Studies  Recent Labs   Lab Test 11/22/22  0848   IRON 68   *   IRONSAT 31   HOLA 429*       IMAGING:  All imaging studies reviewed by me.

## 2023-06-05 NOTE — TELEPHONE ENCOUNTER
"TRANSPLANT OR REPORT    Organ: Liver/Kidney  Laterality (if known): TBD  Organ Location: [facility]    UNOS ID: [id number]  Donor OR Time: 0900  Expected/Actual Cross Clamp Time: 1000  Expected Organ Arrival Time: 1100    Surgeon: Etienne  Time in OR: 0900  Time in 3C (N/A for JENNIFER): NA    Recipient Details  Admission ETA: 1400  Unit: 7A  Isolation: NA  Latex Allergy: No  : No  Diagnosis: ESLD, ESRD    Liver Transplants  Bypass/Perfusion: Yes  Cellsaver: Yes  Hemodialysis: Yes  ~ \"RENAL STAFF TEACHING SERVICE MEDICINE\" : Remind JENNIFER Fellow to discuss with nephrology on call.  ~ CRRT Resource Nurse: Jorge Jones9  (Telephone Number for CRRT [phone])    Kidney/Panc Transplants  XM Status (Need to wait for XM?): FXM will be drawn once admitted    Liver or KP/PA Recipients - Vessel Banking:  Donor has positive serologies for HIV/HCV/HBV: No  Donor has risk criteria for HIV/HCV/HBV: NA      Transplant Coordinator Contact Info: Lorna Mancilla -[phone]      Vessel Bank Information  Transplant hospitals must not store a donor s extra vessels if the donor has tested positive for any of the following:   - HIV by antibody, antigen, or nucleic acid test (AVERY)   - Hepatitis B surface antigen (HBsAg)   - Hepatitis B (HBV) by AVERY   - Hepatitis C (HCV) by antibody or AVERY     Extra vessels from donors that do not test positive for HIV, HBV, or HCV as above may be stored    "

## 2023-06-05 NOTE — LETTER
Transition Communication Hand-off for Care Transitions to Next Level of Care Provider    Name: Damion Quinones  : 1955  MRN #: [id number]  Primary Care Provider: Gary Serrano     Primary Clinic: [address] [place] [id number]     Reason for Hospitalization:  Liver transplant candidate [Z76.82]  Liver transplant recipient (H) [Z94.4]  Admit Date/Time: 2023  1:14 PM  Discharge Date: 23  Payor Source: Payor: BCBS / Plan: BCBS OUT OF STATE / Product Type: Indemnity /     Readmission Assessment Measure (ALTA) Risk Score/category:     Plan of Care Goals/Milestone Events:   Transitional Care         Reason for Communication Hand-off Referral: Multiple providers/specialties    Discharge Plan:  [facility]     Concern for non-adherence with plan of care:   Y/N N  Discharge Needs Assessment:  Needs      Flowsheet Row Most Recent Value   Equipment Currently Used at Home shower chair, walker, rolling            Already enrolled in Tele-monitoring program and name of program:   Follow-up specialty is recommended: Yes    Follow-up plan:    Future Appointments   Date Time Provider Department Center   2023  3:00 PM [address]   2023  1:00 PM Lorna Naik NP [facility]   7/3/2023 10:15 AM Chad Clifton MD [facility]   7/10/2023  1:45 PM Charleen Garcia MD [facility]   2023  8:00 AM Lolis Bermudez MD [facility]       Any outstanding tests or procedures:          As above.     Key Recommendations:      MOUNIKA Dias    AVS/Discharge Summary is the source of truth; this is a helpful guide for improved communication of patient story

## 2023-06-05 NOTE — PLAN OF CARE
VS: BP (!) 132/97 (BP Location: Right arm, Patient Position: Sitting)   Pulse 89   Temp 98.3  F (36.8  C) (Oral)   Resp 18   Neuro: A/Ox4.   Pt was admitted to the floor at 1345. Went down for xray. I completed a Covid and VRE swap

## 2023-06-05 NOTE — ANESTHESIA PREPROCEDURE EVALUATION
Anesthesia Pre-Procedure Evaluation    Patient: Damion Quinones   MRN: [id number] : 1955        Procedure : Procedure(s):  Transplant liver recipient  donor  Transplant kidney recipient  donor          Past Medical History:   Diagnosis Date     Alcoholic cirrhosis of liver with ascites (H) 10/11/2019     C. difficile colitis      Coronary artery disease     [facility] 2023 - complete occlusion of RCA     ESRD (end stage renal disease) on dialysis (H)      History of hemochromatosis 10/11/2019     Hypertension      Obesity      PAF (paroxysmal atrial fibrillation) (H)      Psoriatic arthritis (H)       Past Surgical History:   Procedure Laterality Date     APPENDECTOMY      Removed at 18 Years Old      COLONOSCOPY       at [facility]      CV CORONARY ANGIOGRAM N/A 2023    Procedure: Coronary Angiogram;  Surgeon: Pablo Araujo MD;  Location:  HEART CARDIAC CATH LAB     EYE SURGERY Bilateral     Cataract     HERNIA REPAIR      History of bilateral inguinal hernia repair: 10/28/2014. Open hernia repair: 10/2017. Abdominal wound exploration and debridement 2017     INSERT SHUNT PORTAL TRANSJUGULAR INTRAHEPTIC  2022     IR CVC TUNNEL PLACEMENT > 5 YRS OF AGE  2023      No Known Allergies   Social History     Tobacco Use     Smoking status: Never     Passive exposure: Never     Smokeless tobacco: Never   Vaping Use     Vaping status: Never Used   Substance Use Topics     Alcohol use: Not Currently     Comment: Last ETOH use was 2021      Wt Readings from Last 1 Encounters:   23 102.3 kg (225 lb 8 oz)        Anesthesia Evaluation   Pt has had prior anesthetic. Type: General.    No history of anesthetic complications       ROS/MED HX  ENT/Pulmonary:    (-) tobacco use, asthma and COPD   Neurologic: Comment: - hx of hepatic encephalopathy, w/ recent admission       Cardiovascular:     (+) hypertension--CAD ---dysrhythmias (paroxysmal atrial  fibrillation on warfarin ), Previous cardiac testing   Echo: Date: 6/5/2023 Results:  Interpretation Summary  Technically difficult study. The patient's rhythm is atrial fibrillation.  Global and regional left ventricular function is normal with an EF of 55-60%.  Global right ventricular function is normal.  No significant valvular abnormalities present.  IVC diameter <2.1 cm collapsing >50% with sniff suggests a normal RA pressure  of 3 mmHg.  No pericardial effusion is present.  No significant changes noted.  Stress Test:  Date: 1/12/2023 Results:     The nuclear stress test is negative for inducible myocardial ischemia or infarction.     Left ventricular function is normal  ECG Reviewed:  Date: 6/5/2023 Results:  Atrial fibrillation   Abnormal QRS-T angle, consider primary T wave abnormality   Abnormal ECG   When compared with ECG of 11-MAY-2023 03:50,   No significant change was found   Cath:  Date: 4/27/2023 Results:  Severe single vessel CAD with  of the RCA.  - Stenosis: Prox LAD 30%, 1st and 2nd diagonal 50%, midRCA 100%    METS/Exercise Tolerance:     Hematologic:     (+) History of blood clots, pt is anticoagulated,     Musculoskeletal: Comment: - Psoriatic arthritis       GI/Hepatic: Comment: - Cirrhosis 2/2 EtOH, hemochromatosis  - Hx of Variceal bleed s/p TIPS (10/1/2022)  - Hx Hepatic encephalopathy   - Hx recurrent C. Diff  - Gastric antral vascular ectasia, Hx upper GI bleed  - MELD-Na 30 (6/5/2023)      (+) liver disease,     Renal/Genitourinary:     (+) renal disease (ESRD on HD (M,W,F), IgA Nephropathy + ANCA Vasculitis ), type: ESRD, Pt requires dialysis, type: Hemodialysis,     Endo:     (+) Obesity (BMI 35),     Psychiatric/Substance Use:     (+) alcohol abuse (Sober since 2016)     Infectious Disease:       Malignancy:       Other:  - neg other ROS             OUTSIDE LABS:  CBC:   Lab Results   Component Value Date    WBC 9.2 06/05/2023    WBC 8.5 05/11/2023    HGB 10.8 (L) 06/05/2023     HGB 11.1 (L) 05/11/2023    HCT 34.9 (L) 06/05/2023    HCT 33.7 (L) 05/11/2023     06/05/2023     (L) 05/11/2023     BMP:   Lab Results   Component Value Date     06/05/2023     05/29/2023    POTASSIUM 4.1 06/05/2023    POTASSIUM 3.9 05/29/2023    CHLORIDE 105 06/05/2023    CHLORIDE 106 05/29/2023    CO2 21 (L) 06/05/2023    CO2 25 05/29/2023    BUN 36.9 (H) 06/05/2023    BUN 30.5 (H) 05/29/2023    CR 3.85 (H) 06/05/2023    CR 2.85 (H) 05/29/2023     (H) 06/05/2023    GLC 83 05/29/2023     COAGS:   Lab Results   Component Value Date    PTT 41 (H) 06/05/2023    INR 3.54 (H) 06/05/2023    FIBR 439 06/05/2023     POC: No results found for: BGM, HCG, HCGS  HEPATIC:   Lab Results   Component Value Date    ALBUMIN 2.9 (L) 06/05/2023    PROTTOTAL 5.1 (L) 06/05/2023    ALT 84 (H) 06/05/2023    AST  06/05/2023      Comment:      Unsatisfactory specimen - hemolyzed    Specimen is hemolyzed which can falsely elevate AST. Analysis of a non-hemolyzed specimen may result in a lower value.    ALKPHOS 207 (H) 06/05/2023    BILITOTAL 0.6 06/05/2023    DEBRA 56 04/07/2023     OTHER:   Lab Results   Component Value Date    PH 7.41 01/15/2023    LACT 1.3 04/10/2023    A1C 4.7 05/19/2023    NAZARIO 8.7 (L) 06/05/2023    PHOS 3.4 06/05/2023    MAG 2.2 06/05/2023    LIPASE 149 (H) 01/23/2023    AMYLASE 153 (H) 06/05/2023    TSH 3.37 04/07/2023    T4 1.09 11/22/2022    SED 11 01/15/2023       Anesthesia Plan    ASA Status:  4   NPO Status:  ELEVATED Aspiration Risk/Unknown    Anesthesia Type: General.     - Airway: ETT   Induction: RSI.   Maintenance: Balanced.   Techniques and Equipment:     - Airway: Shoulder Jamari/Ramp, Video-Laryngoscope     - Lines/Monitors: 2nd IV, Arterial Line, Central Line, CVP, BIS, PAC, ASHLEY            ASHLEY Absolute Contra-indication: NONE (GIB 2022)            ASHLEY Relative Contra-indication: Coagulopathy, Esophageal Varices     - Blood: T&S, T&C, FFP, PLT, Cell Saver, PRBC, Blood in  Room     - Drips/Meds: Vasopressin, Epinephrine, Norepi     Consents    Anesthesia Plan(s) and associated risks, benefits, and realistic alternatives discussed. Questions answered and patient/representative(s) expressed understanding.    - Discussed:     - Discussed with:  Patient      - Patient is DNR/DNI Status: No         Postoperative Care    Pain management: IV analgesics.   PONV prophylaxis: Dexamethasone or Solumedrol, Ondansetron (or other 5HT-3)     Comments:    Other Comments: We will plan for general endotracheal anesthesia with modified rapid sequence induction. CVCx2 (MAC lines), PAC, arterial line, ASHLEY (3D ie33 with x7 probe). PIVx1 on blood tubing with no fluid warmer (for dead space minimization with platelets and cryo). LIJ HD catheter already in place. Resuscitation with PlasmaLyte via [place] with dual patient line (please do not open Zeferino until we know the case is definitely proceeding). Under+upper+lower body Zunilda Huggers, BIS, hourly Quantra and ABG to achieve the following goals: CRRT and insulin gtt/boluses with D50 for goal K < 3.5 at reperfusion, calcium gtt for goal iCa > 5.0 at reperfusion, hyperventilation for goal PaCO2 25-30mmHg until after reperfusion (then normal). RBC/FFP in room (10+10 to start). Sevoflurane titrated to BIS (40-60), rocuronium for neuromuscular blockade with likely sugammadex reversal after transfer to ICU sedated on propofol and likely remaining intubated.    Rekha Lyn MD   Pager: [phone]              Adama Pardo MD

## 2023-06-05 NOTE — H&P
Physician Attestation   I saw this patient with the resident and agree with the resident/fellow's findings and plan of care as documented in the note.      Key findings: as noted below    Medical complexity over the past 24 hours:  - Decision regarding MAJOR SURGERY without additional risks    I have personally reviewed the following data over the past 24 hrs:    13.5 (H)  \   8.5 (L)   / 61 (L)     139 107 30.0 (H) /  133 (H)   4.6 17 (L) 1.62 (H) \     ALT: 692 (HH) AST: 816 (HH) AP: 107 TBILI: 1.7 (H)   ALB: 2.3 (L) TOT PROTEIN: 4.2 (L) LIPASE: N/A     Procal: N/A CRP: N/A Lactic Acid: 1.5       INR:  1.98 (H) PTT:  38   D-dimer:  N/A Fibrinogen:  345         Chad Clifton MD  Date of Service (when I saw the patient): 2023    [facility]    History and Physical  Solid Organ Transplant     Date of Admission: 2023      Assessment & Plan   Damion Quinones is a 67 year old male with a past medical history significant for 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 PMHx also includes coronary artery disease, alcohol overuse, obesity, ESRD on HD -- (IgA nephropathy + ANCA vasculitis), psoriatic arthritis, paroxysmal atrial fibrillation (on warfarin), DVT, recurrent c diff, and HTN.  Patient was notified as an acceptable  donor organ became available and presented for further pre-operative work-up.  Patient was informed of the risks and benefits regarding  donor liver and kidney transplantation, and has elected to proceed.    MELD-Na score: 30 at 2023 11:16 AM  MELD score: 30 at 2023 11:16 AM  Calculated from:  Serum Creatinine: On dialysis. Using 4 mg/dL.  Serum Sodium: 140 mmol/L (Using max of 137 mmol/L) at 2023 11:16 AM  Total Bilirubin: 0.5 mg/dL (Using min of 1 mg/dL) at 2023 11:16 AM  INR(ratio): 2.45  at 5/29/2023 11:14 AM  Age: 67 years    -[place] to outpatient for pre-op work-up  -Pre-op labs, including BMP, CBC, coag panel, viral serologies  -EKG, CXR  -COVID screen  -Transplant nephrology consult  -Cardiology consult for perioperative management recommendations.   -ntProBNP  -ECHO       Code Status   Full Code  Disposition Plan   Expected discharge in 10-14 days to prior living arrangement vs rehab.     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 PMHx also includes coronary artery disease, alcohol overuse, obesity, ESRD on HD M-W-F (IgA nephropathy + ANCA vasculitis), psoriatic arthritis, paroxysmal atrial fibrillation ([id number] 4), DVT (4/24/23 LE venous duplex showed nonocclusive thrombus seen in the right popliteal vein, near-occlusive thrombus in the posterior tibial vein in the proximal calf, recurrent c diff, and HTN. On warfarin for anticoagulation (switched from apixaban to warfarin due to listed for liver transplant).    Patient has CKD secondary to IgA nephropathy (secondary related to cirrhosis) and recurrent PATI. Treated with rituximab and prednisone. Remains on prednisone 10 mg daily. Patient started on iHD on 1/27/2023. Tunneled HD line placed 1/26/23. Last HD today. Patient makes some urine.     Cardiac testing including a Lexiscan with no inducible ischemia + 4/27/23 coronary angiogram showed mild to moderate CAD of LAD, 50% stenosis of ost RCA to mid RCA, and 100% stenosis of mid RCA. Patient on ASA 81 mg daily, metoprolol succinate ER 24 mg  daily,  and atorvastatin 40 mg daily. Patient did present to ED on 5/11 with chest pain- EKG was negative for ischemia or infarction. Troponin elevated - difficult to determine as patient on HD. He was not fluid overloaded and electrolytes were stable. Pain resolved and patient was discharged from ED.     At the time of admission, the patient states he has been doing well. Able to do ADLs without shortness of breath. Per patient and family he has not been confused. Denies fever, chills, CP, SOB, cough, abdominal pain, dysuria, or joint pain. Treated for possible skin infection and gout in the last month. Last hospitalization was 4/23-4/24/23 for HE.     Treated recently with prednisone for gouty arthritis left hand. Now has resumed prednisone 10 mg daily dose.     Sober since 2016.       Past Medical History    I have reviewed this patient's medical history and updated it with pertinent information if needed.   Past Medical History:   Diagnosis Date    Alcoholic cirrhosis of liver with ascites (H) 10/11/2019    C. difficile colitis     Coronary artery disease     [facility] 4/2023 - complete occlusion of RCA    Diabetes mellitus type 2, uncomplicated (H) 10/11/2019    ESRD (end stage renal disease) on dialysis (H)     History of hemochromatosis 10/11/2019    Hypertension     Obesity     PAF (paroxysmal atrial fibrillation) (H)     Psoriatic arthritis (H)        Past Surgical History   I have reviewed this patient's surgical history and updated it with pertinent information if needed.  Past Surgical History:   Procedure Laterality Date    APPENDECTOMY      Removed at 18 Years Old     COLONOSCOPY      2014 at [facility].     CV CORONARY ANGIOGRAM N/A 4/27/2023    Procedure: Coronary Angiogram;  Surgeon: Pablo Araujo MD;  Location:  HEART CARDIAC CATH LAB    EYE SURGERY Bilateral     Cataract    HERNIA REPAIR      History of bilateral inguinal hernia repair: 10/28/2014. Open hernia repair: 10/2017.  Abdominal wound exploration and debridement 12/27/2017    INSERT SHUNT PORTAL TRANSJUGULAR INTRAHEPTIC  09/26/2022    IR CVC TUNNEL PLACEMENT > 5 YRS OF AGE  01/26/2023       Prior to Admission Medications   Prior to Admission Medications   Prescriptions Last Dose Informant Patient Reported? Taking?   B Complex-C-Folic Acid (MAKEDA-KENISHA) TABS 6/5/2023  Yes Yes   Sig: Take 1 tablet by mouth daily   Calcium Carbonate-Vitamin D 500-3.125 MG-MCG TABS 6/5/2023 Spouse/Significant Other Yes Yes   Sig: Take 1 tablet by mouth 2 times daily   XIFAXAN 550 MG TABS tablet 6/5/2023 Spouse/Significant Other Yes Yes   Sig: Take 550 mg by mouth 2 times daily   aspirin 81 MG EC tablet 6/4/2023  No Yes   Sig: Take 1 tablet (81 mg) by mouth daily   atorvastatin (LIPITOR) 40 MG tablet 6/5/2023  No Yes   Sig: Take 1 tablet (40 mg) by mouth daily   bumetanide (BUMEX) 1 MG tablet 6/5/2023 Spouse/Significant Other No Yes   Sig: Take 2 tablets (2 mg) by mouth 2 times daily for 180 days   folic acid (FOLVITE) 1 MG tablet 6/5/2023 Spouse/Significant Other Yes Yes   Sig: TAKE FIVE TABLETS BY MOUTH EVERY DAY   lactulose (CHRONULAC) 10 GM/15ML solution 6/5/2023  Yes Yes   Sig: Take 30 g by mouth 2 times daily   metoprolol succinate ER (TOPROL XL) 25 MG 24 hr tablet 6/5/2023 Spouse/Significant Other Yes Yes   Sig: Take 1 tablet by mouth daily at 2 pm   midodrine (PROAMATINE) 2.5 MG tablet 6/5/2023  No Yes   Sig: Take 1 tablet (2.5 mg) by mouth daily   omeprazole 20 MG tablet 6/5/2023  No Yes   Sig: Take 2 tablets (40 mg) by mouth 2 times daily   ondansetron (ZOFRAN ODT) 4 MG ODT tab More than a month  Yes Yes   Sig: Take 1 tablet by mouth every 8 hours as needed   predniSONE (DELTASONE) 5 MG tablet 6/5/2023 Spouse/Significant Other Yes Yes   Sig: Take 10 mg by mouth daily   spironolactone (ALDACTONE) 25 MG tablet 6/4/2023  Yes Yes   Sig: Take 25 mg by mouth daily Start when OK from nephrology   warfarin ANTICOAGULANT (COUMADIN) 1 MG tablet 6/4/2023   No Yes   Sig: Take 2-3 tablets (2-3 mg) by mouth daily Adjust dose per INR as directed by ACC   zinc oxide (DESITIN) 40 % paste More than a month  No Yes   Sig: Apply topically as needed for irritation apply to buttock to prevent bleeding with wiping      Facility-Administered Medications: None     Allergies   No Known Allergies    Social History   I have reviewed this patient's social history and updated it with pertinent information if needed. Damion Quinones  reports that he has never smoked. He has never been exposed to tobacco smoke. He has never used smokeless tobacco. He reports that he does not currently use alcohol. He reports that he does not currently use drugs.    Family History   I have reviewed this patient's family history and updated it with pertinent information if needed.   Family History   Problem Relation Age of Onset    Lung Cancer Mother     Colon Cancer Father     Lung Cancer Brother     Heart Failure Brother     Kidney Disease Brother        Review of Systems   REVIEW OF SYSTEMS:  General: negative, fever, chills, night sweats  Skin: negative, rash, open lesions.  Eyes: negative, double vision, eye pain and photophobia  Ears/Nose/Throat: negative, sore throat, hoarseness, nasal congestion and purulent rhinorrhea  Respiratory: No dyspnea on exertion, No cough, No hemoptysis. +Partial dentures.  Cardiovascular: negative, negative for, as above, palpitations, tachycardia, chest pain, exertional chest pain or pressure and paroxysmal nocturnal dyspnea  : negative for dysuria, frequency or urgency. No change in urine output amount  GI: no nausea, vomiting, constipation, change in bowel movements, blood or mucus in stool. +Loose stools with lactulose usage.  Neuro/psych: negative for depressed mood, anxiety, neuropathy, confusion       Physical Exam   Temp: 98.3  F (36.8  C) Temp src: Oral BP: (!) 132/97 Pulse: 89   Resp: 18        Vital Signs with Ranges  Temp:  [98.3  F (36.8  C)] 98.3  F (36.8   C)  Pulse:  [89] 89  Resp:  [18] 18  BP: (132)/(97) 132/97  0 lbs 0 oz    General Appearance: in no apparent distress.   Skin: warm, no rash, no open lesions, healing skin tears. Left hand erythema.  Heart: irregularly irregular  Lungs: BCTA  Abdomen: The abdomen is soft, obese, nontender, reducible umbilical hernia.  : no stauffer  Extremities: edema: BLE 1-2+. BUE edema, LUE hand edema > RUE.hand edema.   Neurologic: awake, alert and oriented. Tremor: mild      Data   Results for orders placed or performed during the hospital encounter of 06/05/23 (from the past 24 hour(s))   EKG 12-lead, tracing only   Result Value Ref Range    Systolic Blood Pressure  mmHg    Diastolic Blood Pressure  mmHg    Ventricular Rate 76 BPM    Atrial Rate  BPM    NJ Interval  ms    QRS Duration 76 ms     ms    QTc 436 ms    P Axis  degrees    R AXIS 48 degrees    T Axis -24 degrees    Interpretation ECG       Atrial fibrillation  Abnormal QRS-T angle, consider primary T wave abnormality  Abnormal ECG  When compared with ECG of 11-MAY-2023 03:50,  No significant change was found     XR Chest 2 Views    Narrative    EXAM: Chest radiograph 6/5/2023 2:30 PM    HISTORY: 67 years Male pre transplant screen.     COMPARISON: Chest radiograph 5/11/2023. Chest CT 5/11/2023.    TECHNIQUE: Frontal and lateral views of the chest.    FINDINGS:   Left IJ catheter projecting over the low SVC. Embolization coil  material within the left upper quadrant. TIPS shunt is visualized over  the right upper quadrant. The cardiomediastinal silhouette is stable  compared to 5/11/2023 exam. Prominent pulmonary vasculature. Trachea  is midline. There is no focal pulmonary consolidation. No pneumothorax  or significant pleural effusion. Normal lung volumes. Grossly  symmetric hemidiaphragms. Visualized upper abdomen is unremarkable. No  acute or suspicious osseous abnormalities. Soft tissues are  unremarkable.      Impression    IMPRESSION:   1.  No  interstitial or airway disease visualized.  2.  Mild prominence of the pulmonary vasculature likely secondary to  pulmonary hypertension.  3.  Stable positioning of support devices.    I have personally reviewed the examination and initial interpretation  and I agree with the findings.    KEV CHEN MD         SYSTEM ID:  [id number]     Labs in process

## 2023-06-05 NOTE — CONSULTS
Cardiology New Consult Note    Date of Service: 06/05/23    ASSESSMENT:   Damion Quinones is a 67 year old male who presents for a planned liver/kidney transplant. He has a history of cirrhosis 2/2 EtOH and hemochromotosis, CAD (4/2023), ESRD on HD, HTN, obesity DM, HTN, pAF on warfarin. Cardiology consulted for pre op evaluation.     High risk surgery (>5% cardiac complication risk) Coronary Artery Disease (MI, positive stress test, angina, Qs on EKG) Serum Creatinine >2.0 mg/dl    The patient has a RCRI score of 3 which correlates to a 15% 30-day risk of death, MI, or cardiac arrest ([place] 2017). The patient has a functional status of >4 METs, and was functional and independent prior to her current hospitalization. He is currently without stigmata of HF. He has no symptoms of anginal or findings of ACS. A TTE is pending from today. The patient is at an elevated but acceptable risk for surgery tomorrow and is currently optimized from a CV standpoint. tomorrow    RECOMMENDATIONS:  - CXR, TTE, and NTproBNP to assess baseline  - Defer AC to primary following surgery. Should be on AC whenever feasible due to elevate CHADsVASC  - Following discontinuation of pressors, would treat with rate control as well. Can start with low dose BB (metoprolol tartrate 12.5 mg BID), and later revert to PTA dosing.   - Restart atorvastatin and aspirin when ok from a primary perspective.   - Defer bumex and aldactone use to prior/Nephrology    Discussed with attending, Dr. Mendez    Thank you for consulting the cardiovascular services at the [facility]. Please do not hesitate to call us with any questions.    Addendum:  - TTE wnl. No further cardiac work up at this time. BNP for prognostic use only, would not change mgmt if elevated.   - Will sign off.     Francois Granda, PGY-4  Cardiovascular Disease Fellow    ----------------------------------------------------------------------------  REASON FOR  CONSULT: Pre op CV evaluation    History of Present Illness   Damion Quinones is a 67 year old male who presents for a planned liver/kidney transplant. He has a history of cirrhosis 2/2 EtOH and hemochromotosis, CAD (4/2023), ESRD on HD, HTN, obesity DM, HTN, pAF on warfarin. Cardiology consulted for pre op evaluation.     The patient has a history of cirrhosis 2/2 ETOH+hemochromotosis that has been complicated by variceal bleeding requiring TIPS. He has had HE in the past as well. He is also ESRD 2/2 IgA nephropathy. He has been following with both Nephrology and GI for work up liver/kidney transplant. As part of this work up, he has had a various cardiac tests completed. EKGs (including today's demonstrated Afib with normal rate. TTE in 12/2022 demonstrated LVEF of 55-60%, normal RV function, normal valvular function. NM stress test negative for inducible ischemia. Coronary angiogram on 4/27/23 demonstrated CAD, with 30% prox LAD, 50% D2, and diffuse RCA disease with mid RCA , distal suppled by LCX and LAD distal branches. No intervention at that time. He has been without anginal symptoms since the cath per cardiology clinic note on 5/10/23. He is anticoagulated on warfarin for pAF (CHADsVASC 4). Warfarin also used for prior DVT.     On presentation, the patient is in NAD. He denies any cardiopulmonary symptoms at rest. No chest pain or pressure ever. He is able to walk about 1 block before stopping due to fatigue and dyspnea. He is able to do 1-2 flights of stairs at home without stopping. Exercises on recumbent bike for 4 miles at a time. No palpitations. No PNd, orthopnea. Stable abd and leg swelling. No syncope or presyncope.     PAST MEDICAL HISTORY:  Past Medical History:   Diagnosis Date     Alcoholic cirrhosis of liver with ascites (H) 10/11/2019     C. difficile colitis      Coronary artery disease     [facility] 4/2023 - complete occlusion of RCA     ESRD (end stage renal disease) on dialysis (H)      History  of hemochromatosis 10/11/2019     Hypertension      Obesity      PAF (paroxysmal atrial fibrillation) (H)      Psoriatic arthritis (H)        CURRENT MEDICATIONS:  No current outpatient medications on file.       PAST SURGICAL HISTORY:  Past Surgical History:   Procedure Laterality Date     APPENDECTOMY      Removed at 18 Years Old      COLONOSCOPY      2014 at [facility].      CV CORONARY ANGIOGRAM N/A 4/27/2023    Procedure: Coronary Angiogram;  Surgeon: Pablo Araujo MD;  Location:  HEART CARDIAC CATH LAB     EYE SURGERY Bilateral     Cataract     HERNIA REPAIR      History of bilateral inguinal hernia repair: 10/28/2014. Open hernia repair: 10/2017. Abdominal wound exploration and debridement 12/27/2017     INSERT SHUNT PORTAL TRANSJUGULAR INTRAHEPTIC  09/26/2022     IR CVC TUNNEL PLACEMENT > 5 YRS OF AGE  01/26/2023       ALLERGIES  No Known Allergies    FAMILY HISTORY:  Family History   Problem Relation Age of Onset     Lung Cancer Mother      Colon Cancer Father      Lung Cancer Brother      Heart Failure Brother      Kidney Disease Brother        SOCIAL HISTORY:  Social History     Socioeconomic History     Marital status:    Tobacco Use     Smoking status: Never     Passive exposure: Never     Smokeless tobacco: Never   Vaping Use     Vaping status: Never Used   Substance and Sexual Activity     Alcohol use: Not Currently     Comment: Last ETOH use was 12/31/2021     Drug use: Not Currently       Review of Systems:   10-point ROS reviewed, & found negative w/ exceptions noted in the HPI.    Physical Exam   Temp: 98.3  F (36.8  C) Temp src: Oral BP: (!) 132/97 Pulse: 89   Resp: 18        Vital Signs with Ranges  Temp:  [98.3  F (36.8  C)] 98.3  F (36.8  C)  Pulse:  [89] 89  Resp:  [18] 18  BP: (132)/(97) 132/97  0 lbs 0 oz    GEN: NAD, pleasant  HEENT: no icterus  CV: Irregular rate and rhythm, normal s1/s2, no murmurs/rubs/s3/s4, no heave. JVD difficult to appreciate   CHEST:  CTAB  ABD: soft, NT/ND, NABS, distended with presence of ascites  NEURO: AA&Ox3, fluent/appropriate, motor grossly nonfocal  PSYCH: cooperative, affect appropriate  EXT: 1+ BLE edema    Data   No lab results found in last 7 days.    Recent Results (from the past 24 hour(s))   XR Chest 2 Views    Narrative    EXAM: Chest radiograph 6/5/2023 2:30 PM    HISTORY: 67 years Male pre transplant screen.     COMPARISON: Chest radiograph 5/11/2023. Chest CT 5/11/2023.    TECHNIQUE: Frontal and lateral views of the chest.    FINDINGS:   Left IJ catheter projecting over the low SVC. Embolization coil  material within the left upper quadrant. TIPS shunt is visualized over  the right upper quadrant. The cardiomediastinal silhouette is stable  compared to 5/11/2023 exam. Prominent pulmonary vasculature. Trachea  is midline. There is no focal pulmonary consolidation. No pneumothorax  or significant pleural effusion. Normal lung volumes. Grossly  symmetric hemidiaphragms. Visualized upper abdomen is unremarkable. No  acute or suspicious osseous abnormalities. Soft tissues are  unremarkable.      Impression    IMPRESSION:   1.  No interstitial or airway disease visualized.  2.  Mild prominence of the pulmonary vasculature likely secondary to  pulmonary hypertension.  3.  Stable positioning of support devices.    I have personally reviewed the examination and initial interpretation  and I agree with the findings.    KEV CHEN MD         SYSTEM ID:  [id number]       EKG 6/5/23:  Afib, rate controlled    TTE 12/20/22:  Interpretation Summary  The patient's rhythm is atrial fibrillation.  Global and regional left ventricular function is normal with an EF of 55-60%.  Global right ventricular function is normal.  IVC diameter and respiratory changes fall into an intermediate range  suggesting an RA pressure of 8 mmHg.  No pericardial effusion is present.  There has been no change.    NM Stress Test 1/12/23:  Negative for inducible  ischemia    Coronary angiogram 4/27/23:  Left Main   The vessel is moderate in size and is angiographically normal.      Left Anterior Descending   The vessel is moderate in size.   Prox LAD lesion is 30% stenosed.      First Diagonal Branch   The vessel is moderate in size.   1st Diag lesion is 50% stenosed.      First Septal Branch   The vessel is small.      Second Diagonal Branch   The vessel is small.   2nd Diag lesion is 50% stenosed.      Second Septal Branch   The vessel is small.      Third Diagonal Branch   The vessel is small and is angiographically normal.      Left Circumflex   The vessel is moderate in size and is angiographically normal.      First Obtuse Marginal Branch   The vessel is large and is angiographically normal.      Lateral First Obtuse Marginal Branch   The vessel is moderate in size.      Second Obtuse Marginal Branch   The vessel is small and is angiographically normal.      Right Coronary Artery   The vessel is moderate in size.   Ost RCA to Mid RCA lesion is 50% stenosed.   Mid RCA lesion is 100% stenosed.   Mid RCA to Dist RCA lesion is 50% stenosed.      Right Posterior Descending Artery   The vessel is small.   Collaterals   RPDA filled by collaterals from Dist LAD.         Right Posterior Atrioventricular Artery   The vessel is small and is angiographically normal.   Collaterals   RPAV filled by collaterals from Dist Cx.         First Right Posterolateral Branch   The vessel is small and is angiographically normal.      Second Right Posterolateral Branch   The vessel is small and is angiographically normal.

## 2023-06-06 ENCOUNTER — ANESTHESIA EVENT (OUTPATIENT)
Dept: SURGERY | Facility: CLINIC | Age: 68
End: 2023-06-06
Payer: COMMERCIAL

## 2023-06-06 ENCOUNTER — APPOINTMENT (OUTPATIENT)
Dept: ULTRASOUND IMAGING | Facility: CLINIC | Age: 68
End: 2023-06-06
Attending: SURGERY
Payer: COMMERCIAL

## 2023-06-06 ENCOUNTER — APPOINTMENT (OUTPATIENT)
Dept: GENERAL RADIOLOGY | Facility: CLINIC | Age: 68
End: 2023-06-06
Attending: SURGERY
Payer: COMMERCIAL

## 2023-06-06 ENCOUNTER — ANESTHESIA (OUTPATIENT)
Dept: SURGERY | Facility: CLINIC | Age: 68
End: 2023-06-06
Payer: COMMERCIAL

## 2023-06-06 ENCOUNTER — APPOINTMENT (OUTPATIENT)
Dept: GENERAL RADIOLOGY | Facility: CLINIC | Age: 68
End: 2023-06-06
Attending: TRANSPLANT SURGERY
Payer: COMMERCIAL

## 2023-06-06 PROBLEM — Z94.4 LIVER TRANSPLANT RECIPIENT (H): Status: ACTIVE | Noted: 2023-06-06

## 2023-06-06 LAB
ALBUMIN SERPL BCG-MCNC: 2.1 G/DL (ref 3.5–5.2)
ALBUMIN SERPL BCG-MCNC: 2.7 G/DL (ref 3.5–5.2)
ALLEN'S TEST: ABNORMAL
ALP SERPL-CCNC: 203 U/L (ref 40–129)
ALP SERPL-CCNC: 99 U/L (ref 40–129)
ALT SERPL W P-5'-P-CCNC: 624 U/L (ref 10–50)
ALT SERPL W P-5'-P-CCNC: 76 U/L (ref 10–50)
ANION GAP SERPL CALCULATED.3IONS-SCNC: 12 MMOL/L (ref 7–15)
ANION GAP SERPL CALCULATED.3IONS-SCNC: 15 MMOL/L (ref 7–15)
APTT PPP: 42 SECONDS (ref 22–38)
APTT PPP: 64 SECONDS (ref 22–38)
AST SERPL W P-5'-P-CCNC: 1276 U/L (ref 10–50)
AST SERPL W P-5'-P-CCNC: 157 U/L (ref 10–50)
BASE EXCESS BLDA CALC-SCNC: -2.5 MMOL/L (ref -9.6–2)
BASE EXCESS BLDA CALC-SCNC: -3.3 MMOL/L (ref -9.6–2)
BASE EXCESS BLDA CALC-SCNC: -3.7 MMOL/L (ref -9–1.8)
BASE EXCESS BLDA CALC-SCNC: -3.9 MMOL/L (ref -9.6–2)
BASE EXCESS BLDA CALC-SCNC: -4.3 MMOL/L (ref -9.6–2)
BASE EXCESS BLDA CALC-SCNC: -4.5 MMOL/L (ref -9.6–2)
BASE EXCESS BLDA CALC-SCNC: -4.6 MMOL/L (ref -9.6–2)
BASE EXCESS BLDA CALC-SCNC: -5.2 MMOL/L (ref -9.6–2)
BASE EXCESS BLDV CALC-SCNC: -2.4 MMOL/L (ref -7.7–1.9)
BASOPHILS # BLD AUTO: 0 10E3/UL (ref 0–0.2)
BASOPHILS NFR BLD AUTO: 0 %
BILIRUB DIRECT SERPL-MCNC: 1.45 MG/DL (ref 0–0.3)
BILIRUB SERPL-MCNC: 0.6 MG/DL
BILIRUB SERPL-MCNC: 1.7 MG/DL
BLD PROD TYP BPU: NORMAL
BLOOD COMPONENT TYPE: NORMAL
BUN SERPL-MCNC: 32.6 MG/DL (ref 8–23)
BUN SERPL-MCNC: 50.9 MG/DL (ref 8–23)
CA-I BLD-MCNC: 4.4 MG/DL (ref 4.4–5.2)
CA-I BLD-MCNC: 4.4 MG/DL (ref 4.4–5.2)
CA-I BLD-MCNC: 4.8 MG/DL (ref 4.4–5.2)
CA-I BLD-MCNC: 4.8 MG/DL (ref 4.4–5.2)
CA-I BLD-MCNC: 4.9 MG/DL (ref 4.4–5.2)
CA-I BLD-MCNC: 4.9 MG/DL (ref 4.4–5.2)
CA-I BLD-MCNC: 5 MG/DL (ref 4.4–5.2)
CALCIUM SERPL-MCNC: 8.6 MG/DL (ref 8.8–10.2)
CALCIUM SERPL-MCNC: 8.8 MG/DL (ref 8.8–10.2)
CHLORIDE SERPL-SCNC: 104 MMOL/L (ref 98–107)
CHLORIDE SERPL-SCNC: 107 MMOL/L (ref 98–107)
CLOT INIT KAOL IND TO POST HEP NEUT TRTO: 1.1 {RATIO}
CLOT INIT KAOLIN IND BLD US: 143 SEC (ref 113–166)
CLOT INIT KAOLIN IND P HEP NEUT BLD US: 132 SEC (ref 103–153)
CLOT STIFF PLT CONT BLD CALC: 12.5 HPA (ref 11.9–29.8)
CLOT STIFF TF IND P HEP NEUT BLD US: 14.5 HPA (ref 13–33.2)
CLOT STIFF TF IND+IIB-IIIA INH P HEP NEU: 2 HPA (ref 1–3.7)
CMV IGG SERPL IA-ACNC: >10 U/ML
CMV IGG SERPL IA-ACNC: ABNORMAL
CODING SYSTEM: NORMAL
CREAT SERPL-MCNC: 2.76 MG/DL (ref 0.67–1.17)
CREAT SERPL-MCNC: 4.5 MG/DL (ref 0.67–1.17)
DEPRECATED HCO3 PLAS-SCNC: 20 MMOL/L (ref 22–29)
DEPRECATED HCO3 PLAS-SCNC: 22 MMOL/L (ref 22–29)
EBV VCA IGG SER IA-ACNC: 443 U/ML
EBV VCA IGG SER IA-ACNC: POSITIVE
EOSINOPHIL # BLD AUTO: 0 10E3/UL (ref 0–0.7)
EOSINOPHIL NFR BLD AUTO: 0 %
ERYTHROCYTE [DISTWIDTH] IN BLOOD BY AUTOMATED COUNT: 17 % (ref 10–15)
ERYTHROCYTE [DISTWIDTH] IN BLOOD BY AUTOMATED COUNT: 17.5 % (ref 10–15)
ERYTHROCYTE [DISTWIDTH] IN BLOOD BY AUTOMATED COUNT: 17.7 % (ref 10–15)
FIBRINOGEN PPP-MCNC: 159 MG/DL (ref 170–490)
FIBRINOGEN PPP-MCNC: 182 MG/DL (ref 170–490)
GFR SERPL CREATININE-BSD FRML MDRD: 14 ML/MIN/1.73M2
GFR SERPL CREATININE-BSD FRML MDRD: 25 ML/MIN/1.73M2
GLUCOSE BLD-MCNC: 106 MG/DL (ref 70–99)
GLUCOSE BLD-MCNC: 118 MG/DL (ref 70–99)
GLUCOSE BLD-MCNC: 162 MG/DL (ref 70–99)
GLUCOSE BLD-MCNC: 177 MG/DL (ref 70–99)
GLUCOSE BLD-MCNC: 181 MG/DL (ref 70–99)
GLUCOSE BLD-MCNC: 188 MG/DL (ref 70–99)
GLUCOSE BLD-MCNC: 94 MG/DL (ref 70–99)
GLUCOSE BLDC GLUCOMTR-MCNC: 197 MG/DL (ref 70–99)
GLUCOSE BLDC GLUCOMTR-MCNC: 200 MG/DL (ref 70–99)
GLUCOSE SERPL-MCNC: 195 MG/DL (ref 70–99)
GLUCOSE SERPL-MCNC: 82 MG/DL (ref 70–99)
HBA1C MFR BLD: 5.1 %
HBV CORE AB SERPL QL IA: NONREACTIVE
HBV SURFACE AB SERPL IA-ACNC: 0.15 M[IU]/ML
HBV SURFACE AB SERPL IA-ACNC: NONREACTIVE M[IU]/ML
HBV SURFACE AG SERPL QL IA: REACTIVE
HCO3 BLD-SCNC: 21 MMOL/L (ref 21–28)
HCO3 BLDA-SCNC: 18 MMOL/L (ref 21–28)
HCO3 BLDA-SCNC: 20 MMOL/L (ref 21–28)
HCO3 BLDA-SCNC: 21 MMOL/L (ref 21–28)
HCO3 BLDA-SCNC: 22 MMOL/L (ref 21–28)
HCO3 BLDA-SCNC: 22 MMOL/L (ref 21–28)
HCO3 BLDV-SCNC: 23 MMOL/L (ref 21–28)
HCT VFR BLD AUTO: 26.2 % (ref 40–53)
HCT VFR BLD AUTO: 28.1 % (ref 40–53)
HCT VFR BLD AUTO: 37.3 % (ref 40–53)
HCV AB SERPL QL IA: NONREACTIVE
HGB BLD-MCNC: 10 G/DL (ref 13.3–17.7)
HGB BLD-MCNC: 11.6 G/DL (ref 13.3–17.7)
HGB BLD-MCNC: 7.2 G/DL (ref 13.3–17.7)
HGB BLD-MCNC: 8.2 G/DL (ref 13.3–17.7)
HGB BLD-MCNC: 8.4 G/DL (ref 13.3–17.7)
HGB BLD-MCNC: 8.7 G/DL (ref 13.3–17.7)
HGB BLD-MCNC: 9 G/DL (ref 13.3–17.7)
HGB BLD-MCNC: 9.1 G/DL (ref 13.3–17.7)
HGB BLD-MCNC: 9.2 G/DL (ref 13.3–17.7)
HGB BLD-MCNC: 9.9 G/DL (ref 13.3–17.7)
HIV 1+2 AB+HIV1 P24 AG SERPL QL IA: NONREACTIVE
IMM GRANULOCYTES # BLD: 0.1 10E3/UL
IMM GRANULOCYTES NFR BLD: 1 %
INR PPP: 2.17 (ref 0.85–1.15)
INR PPP: 3.06 (ref 0.85–1.15)
INR PPP: 4.3 (ref 0.85–1.15)
INR PPP: 5.17 (ref 0.85–1.15)
ISSUE DATE AND TIME: NORMAL
LACTATE BLD-SCNC: 1.1 MMOL/L
LACTATE BLD-SCNC: 1.2 MMOL/L
LACTATE BLD-SCNC: 1.7 MMOL/L
LACTATE BLD-SCNC: 2 MMOL/L
LACTATE BLD-SCNC: 2.1 MMOL/L
LACTATE BLD-SCNC: 2.3 MMOL/L
LACTATE BLD-SCNC: 2.6 MMOL/L
LACTATE SERPL-SCNC: 3.3 MMOL/L (ref 0.7–2)
LYMPHOCYTES # BLD AUTO: 0 10E3/UL (ref 0.8–5.3)
LYMPHOCYTES NFR BLD AUTO: 0 %
MAGNESIUM SERPL-MCNC: 2.3 MG/DL (ref 1.7–2.3)
MCH RBC QN AUTO: 30.1 PG (ref 26.5–33)
MCH RBC QN AUTO: 30.3 PG (ref 26.5–33)
MCH RBC QN AUTO: 30.5 PG (ref 26.5–33)
MCHC RBC AUTO-ENTMCNC: 31.1 G/DL (ref 31.5–36.5)
MCHC RBC AUTO-ENTMCNC: 32.1 G/DL (ref 31.5–36.5)
MCHC RBC AUTO-ENTMCNC: 32.4 G/DL (ref 31.5–36.5)
MCV RBC AUTO: 93 FL (ref 78–100)
MCV RBC AUTO: 95 FL (ref 78–100)
MCV RBC AUTO: 98 FL (ref 78–100)
MONOCYTES # BLD AUTO: 0.1 10E3/UL (ref 0–1.3)
MONOCYTES NFR BLD AUTO: 1 %
NEUTROPHILS # BLD AUTO: 12.1 10E3/UL (ref 1.6–8.3)
NEUTROPHILS NFR BLD AUTO: 98 %
NRBC # BLD AUTO: 0 10E3/UL
NRBC BLD AUTO-RTO: 0 /100
O2/TOTAL GAS SETTING VFR VENT: 35 %
O2/TOTAL GAS SETTING VFR VENT: 40 %
O2/TOTAL GAS SETTING VFR VENT: 45 %
O2/TOTAL GAS SETTING VFR VENT: 45 %
O2/TOTAL GAS SETTING VFR VENT: 50 %
O2/TOTAL GAS SETTING VFR VENT: 50 %
O2/TOTAL GAS SETTING VFR VENT: 63 %
OXYHGB MFR BLDV: 65 % (ref 70–75)
PCO2 BLD: 38 MM HG (ref 35–45)
PCO2 BLDA: 29 MM HG (ref 35–45)
PCO2 BLDA: 32 MM HG (ref 35–45)
PCO2 BLDA: 34 MM HG (ref 35–45)
PCO2 BLDA: 34 MM HG (ref 35–45)
PCO2 BLDA: 36 MM HG (ref 35–45)
PCO2 BLDA: 37 MM HG (ref 35–45)
PCO2 BLDA: 39 MM HG (ref 35–45)
PCO2 BLDV: 41 MM HG (ref 40–50)
PH BLD: 7.36 [PH] (ref 7.35–7.45)
PH BLDA: 7.35 [PH] (ref 7.35–7.45)
PH BLDA: 7.37 [PH] (ref 7.35–7.45)
PH BLDA: 7.38 [PH] (ref 7.35–7.45)
PH BLDA: 7.38 [PH] (ref 7.35–7.45)
PH BLDA: 7.39 [PH] (ref 7.35–7.45)
PH BLDA: 7.4 [PH] (ref 7.35–7.45)
PH BLDA: 7.42 [PH] (ref 7.35–7.45)
PH BLDV: 7.36 [PH] (ref 7.32–7.43)
PHOSPHATE SERPL-MCNC: 4.6 MG/DL (ref 2.5–4.5)
PLAT MORPH BLD: ABNORMAL
PLATELET # BLD AUTO: 114 10E3/UL (ref 150–450)
PLATELET # BLD AUTO: 162 10E3/UL (ref 150–450)
PLATELET # BLD AUTO: 74 10E3/UL (ref 150–450)
PO2 BLD: 211 MM HG (ref 80–105)
PO2 BLDA: 105 MM HG (ref 80–105)
PO2 BLDA: 111 MM HG (ref 80–105)
PO2 BLDA: 116 MM HG (ref 80–105)
PO2 BLDA: 124 MM HG (ref 80–105)
PO2 BLDA: 141 MM HG (ref 80–105)
PO2 BLDA: 198 MM HG (ref 80–105)
PO2 BLDA: 94 MM HG (ref 80–105)
PO2 BLDV: 36 MM HG (ref 25–47)
POLYCHROMASIA BLD QL SMEAR: SLIGHT
POTASSIUM BLD-SCNC: 3 MMOL/L (ref 3.5–5)
POTASSIUM BLD-SCNC: 3.4 MMOL/L (ref 3.5–5)
POTASSIUM BLD-SCNC: 3.7 MMOL/L (ref 3.5–5)
POTASSIUM BLD-SCNC: 3.7 MMOL/L (ref 3.5–5)
POTASSIUM BLD-SCNC: 3.8 MMOL/L (ref 3.5–5)
POTASSIUM BLD-SCNC: 3.8 MMOL/L (ref 3.5–5)
POTASSIUM BLD-SCNC: 4.2 MMOL/L (ref 3.5–5)
POTASSIUM SERPL-SCNC: 4.2 MMOL/L (ref 3.4–5.3)
POTASSIUM SERPL-SCNC: 5 MMOL/L (ref 3.4–5.3)
PROT SERPL-MCNC: 3.4 G/DL (ref 6.4–8.3)
PROT SERPL-MCNC: 5.1 G/DL (ref 6.4–8.3)
RADIOLOGIST FLAGS: ABNORMAL
RBC # BLD AUTO: 2.77 10E6/UL (ref 4.4–5.9)
RBC # BLD AUTO: 3.02 10E6/UL (ref 4.4–5.9)
RBC # BLD AUTO: 3.8 10E6/UL (ref 4.4–5.9)
RBC MORPH BLD: ABNORMAL
SODIUM BLD-SCNC: 136 MMOL/L (ref 133–144)
SODIUM BLD-SCNC: 137 MMOL/L (ref 133–144)
SODIUM BLD-SCNC: 137 MMOL/L (ref 133–144)
SODIUM BLD-SCNC: 138 MMOL/L (ref 133–144)
SODIUM BLD-SCNC: 140 MMOL/L (ref 133–144)
SODIUM SERPL-SCNC: 138 MMOL/L (ref 136–145)
SODIUM SERPL-SCNC: 142 MMOL/L (ref 136–145)
UNIT ABO/RH: NORMAL
UNIT NUMBER: NORMAL
UNIT STATUS: NORMAL
UNIT TYPE ISBT: 5100
UNIT TYPE ISBT: 6200
WBC # BLD AUTO: 10.8 10E3/UL (ref 4–11)
WBC # BLD AUTO: 12.1 10E3/UL (ref 4–11)
WBC # BLD AUTO: 12.3 10E3/UL (ref 4–11)

## 2023-06-06 PROCEDURE — 258N000003 HC RX IP 258 OP 636: Performed by: NURSE PRACTITIONER

## 2023-06-06 PROCEDURE — 74018 RADEX ABDOMEN 1 VIEW: CPT | Mod: 26 | Performed by: STUDENT IN AN ORGANIZED HEALTH CARE EDUCATION/TRAINING PROGRAM

## 2023-06-06 PROCEDURE — 0FY00Z0 TRANSPLANTATION OF LIVER, ALLOGENEIC, OPEN APPROACH: ICD-10-PCS | Performed by: TRANSPLANT SURGERY

## 2023-06-06 PROCEDURE — 88313 SPECIAL STAINS GROUP 2: CPT | Mod: 26 | Performed by: PATHOLOGY

## 2023-06-06 PROCEDURE — 258N000001 HC RX 258: Performed by: TRANSPLANT SURGERY

## 2023-06-06 PROCEDURE — 250N000012 HC RX MED GY IP 250 OP 636 PS 637: Performed by: TRANSPLANT SURGERY

## 2023-06-06 PROCEDURE — P9059 PLASMA, FRZ BETWEEN 8-24HOUR: HCPCS

## 2023-06-06 PROCEDURE — 85730 THROMBOPLASTIN TIME PARTIAL: CPT | Performed by: TRANSPLANT SURGERY

## 2023-06-06 PROCEDURE — 82803 BLOOD GASES ANY COMBINATION: CPT

## 2023-06-06 PROCEDURE — 85610 PROTHROMBIN TIME: CPT | Performed by: PHYSICIAN ASSISTANT

## 2023-06-06 PROCEDURE — 258N000003 HC RX IP 258 OP 636: Performed by: PHYSICIAN ASSISTANT

## 2023-06-06 PROCEDURE — 82805 BLOOD GASES W/O2 SATURATION: CPT | Performed by: TRANSPLANT SURGERY

## 2023-06-06 PROCEDURE — 84450 TRANSFERASE (AST) (SGOT): CPT | Performed by: SURGERY

## 2023-06-06 PROCEDURE — 250N000011 HC RX IP 250 OP 636: Performed by: TRANSPLANT SURGERY

## 2023-06-06 PROCEDURE — 93975 VASCULAR STUDY: CPT | Mod: 26 | Performed by: RADIOLOGY

## 2023-06-06 PROCEDURE — 85027 COMPLETE CBC AUTOMATED: CPT | Performed by: TRANSPLANT SURGERY

## 2023-06-06 PROCEDURE — 85027 COMPLETE CBC AUTOMATED: CPT | Performed by: SURGERY

## 2023-06-06 PROCEDURE — 250N000011 HC RX IP 250 OP 636: Performed by: STUDENT IN AN ORGANIZED HEALTH CARE EDUCATION/TRAINING PROGRAM

## 2023-06-06 PROCEDURE — P9016 RBC LEUKOCYTES REDUCED: HCPCS

## 2023-06-06 PROCEDURE — 250N000011 HC RX IP 250 OP 636

## 2023-06-06 PROCEDURE — 88304 TISSUE EXAM BY PATHOLOGIST: CPT | Mod: 26 | Performed by: PATHOLOGY

## 2023-06-06 PROCEDURE — 47135 TRANSPLANTATION OF LIVER: CPT | Performed by: TRANSPLANT SURGERY

## 2023-06-06 PROCEDURE — 250N000009 HC RX 250: Performed by: TRANSPLANT SURGERY

## 2023-06-06 PROCEDURE — 250N000012 HC RX MED GY IP 250 OP 636 PS 637: Performed by: SURGERY

## 2023-06-06 PROCEDURE — 250N000011 HC RX IP 250 OP 636: Performed by: NURSE PRACTITIONER

## 2023-06-06 PROCEDURE — 5A1D90Z PERFORMANCE OF URINARY FILTRATION, CONTINUOUS, GREATER THAN 18 HOURS PER DAY: ICD-10-PCS | Performed by: TRANSPLANT SURGERY

## 2023-06-06 PROCEDURE — 258N000003 HC RX IP 258 OP 636

## 2023-06-06 PROCEDURE — 94002 VENT MGMT INPAT INIT DAY: CPT

## 2023-06-06 PROCEDURE — 85384 FIBRINOGEN ACTIVITY: CPT | Performed by: SURGERY

## 2023-06-06 PROCEDURE — 812N000001 HC ACQUISITION KIDNEY CADAVER OF KIDNEY/LIVER

## 2023-06-06 PROCEDURE — 999N000063 XR ABDOMEN PORT 1 VIEW

## 2023-06-06 PROCEDURE — 85610 PROTHROMBIN TIME: CPT | Performed by: SURGERY

## 2023-06-06 PROCEDURE — 250N000009 HC RX 250: Performed by: INTERNAL MEDICINE

## 2023-06-06 PROCEDURE — 250N000011 HC RX IP 250 OP 636: Performed by: PHYSICIAN ASSISTANT

## 2023-06-06 PROCEDURE — 0TJ50ZZ INSPECTION OF KIDNEY, OPEN APPROACH: ICD-10-PCS | Performed by: TRANSPLANT SURGERY

## 2023-06-06 PROCEDURE — 250N000009 HC RX 250

## 2023-06-06 PROCEDURE — 258N000003 HC RX IP 258 OP 636: Performed by: ANESTHESIOLOGY

## 2023-06-06 PROCEDURE — 250N000011 HC RX IP 250 OP 636: Performed by: SURGERY

## 2023-06-06 PROCEDURE — 250N000009 HC RX 250: Performed by: STUDENT IN AN ORGANIZED HEALTH CARE EDUCATION/TRAINING PROGRAM

## 2023-06-06 PROCEDURE — 250N000013 HC RX MED GY IP 250 OP 250 PS 637

## 2023-06-06 PROCEDURE — 250N000013 HC RX MED GY IP 250 OP 250 PS 637: Performed by: NURSE PRACTITIONER

## 2023-06-06 PROCEDURE — 88309 TISSUE EXAM BY PATHOLOGIST: CPT | Mod: 26 | Performed by: PATHOLOGY

## 2023-06-06 PROCEDURE — 258N000003 HC RX IP 258 OP 636: Performed by: STUDENT IN AN ORGANIZED HEALTH CARE EDUCATION/TRAINING PROGRAM

## 2023-06-06 PROCEDURE — 360N000078 HC SURGERY LEVEL 5, PER MIN: Performed by: TRANSPLANT SURGERY

## 2023-06-06 PROCEDURE — 370N000017 HC ANESTHESIA TECHNICAL FEE, PER MIN: Performed by: TRANSPLANT SURGERY

## 2023-06-06 PROCEDURE — 812N000005 HC ACQUISITION LIVER CADAVER KIDNEY/LIVER

## 2023-06-06 PROCEDURE — P9037 PLATE PHERES LEUKOREDU IRRAD: HCPCS

## 2023-06-06 PROCEDURE — 84100 ASSAY OF PHOSPHORUS: CPT | Performed by: PHYSICIAN ASSISTANT

## 2023-06-06 PROCEDURE — 82310 ASSAY OF CALCIUM: CPT | Performed by: SURGERY

## 2023-06-06 PROCEDURE — 999N000065 XR ABDOMEN PORT 1 VIEW

## 2023-06-06 PROCEDURE — 258N000003 HC RX IP 258 OP 636: Performed by: TRANSPLANT SURGERY

## 2023-06-06 PROCEDURE — 83735 ASSAY OF MAGNESIUM: CPT | Performed by: PHYSICIAN ASSISTANT

## 2023-06-06 PROCEDURE — 85610 PROTHROMBIN TIME: CPT | Performed by: TRANSPLANT SURGERY

## 2023-06-06 PROCEDURE — 83605 ASSAY OF LACTIC ACID: CPT | Performed by: SURGERY

## 2023-06-06 PROCEDURE — 99291 CRITICAL CARE FIRST HOUR: CPT | Mod: 24 | Performed by: STUDENT IN AN ORGANIZED HEALTH CARE EDUCATION/TRAINING PROGRAM

## 2023-06-06 PROCEDURE — 999N000157 HC STATISTIC RCP TIME EA 10 MIN

## 2023-06-06 PROCEDURE — 250N000025 HC SEVOFLURANE, PER MIN: Performed by: TRANSPLANT SURGERY

## 2023-06-06 PROCEDURE — 250N000009 HC RX 250: Performed by: PHYSICIAN ASSISTANT

## 2023-06-06 PROCEDURE — 30243S1 TRANSFUSION OF NONAUTOLOGOUS GLOBULIN INTO CENTRAL VEIN, PERCUTANEOUS APPROACH: ICD-10-PCS | Performed by: TRANSPLANT SURGERY

## 2023-06-06 PROCEDURE — 99207 US RENAL TRANSPLANT WITH DOPPLER: CPT | Mod: 26 | Performed by: RADIOLOGY

## 2023-06-06 PROCEDURE — C2617 STENT, NON-COR, TEM W/O DEL: HCPCS | Performed by: TRANSPLANT SURGERY

## 2023-06-06 PROCEDURE — 85396 CLOTTING ASSAY WHOLE BLOOD: CPT | Performed by: TRANSPLANT SURGERY

## 2023-06-06 PROCEDURE — 36415 COLL VENOUS BLD VENIPUNCTURE: CPT | Performed by: PHYSICIAN ASSISTANT

## 2023-06-06 PROCEDURE — 410N000003 HC PER-PERFUSION 1ST 30 MIN: Performed by: TRANSPLANT SURGERY

## 2023-06-06 PROCEDURE — 76776 US EXAM K TRANSPL W/DOPPLER: CPT

## 2023-06-06 PROCEDURE — 82803 BLOOD GASES ANY COMBINATION: CPT | Performed by: SURGERY

## 2023-06-06 PROCEDURE — 999N000065 XR CHEST PORT 1 VIEW

## 2023-06-06 PROCEDURE — 200N000002 HC R&B ICU UMMC

## 2023-06-06 PROCEDURE — 85027 COMPLETE CBC AUTOMATED: CPT | Performed by: PHYSICIAN ASSISTANT

## 2023-06-06 PROCEDURE — 272N000001 HC OR GENERAL SUPPLY STERILE: Performed by: TRANSPLANT SURGERY

## 2023-06-06 PROCEDURE — 80053 COMPREHEN METABOLIC PANEL: CPT | Performed by: PHYSICIAN ASSISTANT

## 2023-06-06 PROCEDURE — 0TY00Z0 TRANSPLANTATION OF RIGHT KIDNEY, ALLOGENEIC, OPEN APPROACH: ICD-10-PCS | Performed by: TRANSPLANT SURGERY

## 2023-06-06 PROCEDURE — 85396 CLOTTING ASSAY WHOLE BLOOD: CPT

## 2023-06-06 PROCEDURE — 71045 X-RAY EXAM CHEST 1 VIEW: CPT | Mod: 26 | Performed by: STUDENT IN AN ORGANIZED HEALTH CARE EDUCATION/TRAINING PROGRAM

## 2023-06-06 PROCEDURE — 88313 SPECIAL STAINS GROUP 2: CPT | Mod: TC | Performed by: TRANSPLANT SURGERY

## 2023-06-06 PROCEDURE — 85384 FIBRINOGEN ACTIVITY: CPT | Performed by: TRANSPLANT SURGERY

## 2023-06-06 PROCEDURE — 50360 RNL ALTRNSPLJ W/O RCP NFRCT: CPT | Mod: LT | Performed by: TRANSPLANT SURGERY

## 2023-06-06 PROCEDURE — 93975 VASCULAR STUDY: CPT

## 2023-06-06 PROCEDURE — 85730 THROMBOPLASTIN TIME PARTIAL: CPT | Performed by: SURGERY

## 2023-06-06 PROCEDURE — 83036 HEMOGLOBIN GLYCOSYLATED A1C: CPT | Performed by: SURGERY

## 2023-06-06 PROCEDURE — 250N000009 HC RX 250: Performed by: ANESTHESIOLOGY

## 2023-06-06 PROCEDURE — 84132 ASSAY OF SERUM POTASSIUM: CPT

## 2023-06-06 DEVICE — SOF-FLEX DOUBLE PIGTAIL URETERAL STENT SET
Type: IMPLANTABLE DEVICE | Site: URETER | Status: NON-FUNCTIONAL
Brand: SOF-FLEX
Removed: 2023-08-01

## 2023-06-06 RX ORDER — CALCIUM CHLORIDE, MAGNESIUM CHLORIDE, SODIUM CHLORIDE, SODIUM BICARBONATE, POTASSIUM CHLORIDE AND SODIUM PHOSPHATE DIBASIC DIHYDRATE 3.68; 3.05; 6.34; 3.09; .314; .187 G/L; G/L; G/L; G/L; G/L; G/L
INJECTION INTRAVENOUS CONTINUOUS
Status: DISCONTINUED | OUTPATIENT
Start: 2023-06-06 | End: 2023-06-06

## 2023-06-06 RX ORDER — PROPOFOL 10 MG/ML
INJECTION, EMULSION INTRAVENOUS PRN
Status: DISCONTINUED | OUTPATIENT
Start: 2023-06-06 | End: 2023-06-06

## 2023-06-06 RX ORDER — ASPIRIN 81 MG/1
81 TABLET, CHEWABLE ORAL DAILY
Status: DISCONTINUED | OUTPATIENT
Start: 2023-06-07 | End: 2023-06-07

## 2023-06-06 RX ORDER — SODIUM CHLORIDE, SODIUM GLUCONATE, SODIUM ACETATE, POTASSIUM CHLORIDE AND MAGNESIUM CHLORIDE 526; 502; 368; 37; 30 MG/100ML; MG/100ML; MG/100ML; MG/100ML; MG/100ML
INJECTION, SOLUTION INTRAVENOUS CONTINUOUS PRN
Status: DISCONTINUED | OUTPATIENT
Start: 2023-06-06 | End: 2023-06-07

## 2023-06-06 RX ORDER — NALOXONE HYDROCHLORIDE 0.4 MG/ML
0.4 INJECTION, SOLUTION INTRAMUSCULAR; INTRAVENOUS; SUBCUTANEOUS
Status: DISCONTINUED | OUTPATIENT
Start: 2023-06-06 | End: 2023-06-25 | Stop reason: HOSPADM

## 2023-06-06 RX ORDER — MANNITOL 20 G/100ML
INJECTION, SOLUTION INTRAVENOUS PRN
Status: DISCONTINUED | OUTPATIENT
Start: 2023-06-06 | End: 2023-06-06

## 2023-06-06 RX ORDER — VASOPRESSIN IN 0.9 % NACL 2 UNIT/2ML
SYRINGE (ML) INTRAVENOUS PRN
Status: DISCONTINUED | OUTPATIENT
Start: 2023-06-06 | End: 2023-06-07

## 2023-06-06 RX ORDER — CALCIUM CHLORIDE, MAGNESIUM CHLORIDE, SODIUM CHLORIDE, SODIUM BICARBONATE, POTASSIUM CHLORIDE AND SODIUM PHOSPHATE DIBASIC DIHYDRATE 3.68; 3.05; 6.34; 3.09; .314; .187 G/L; G/L; G/L; G/L; G/L; G/L
12.5 INJECTION INTRAVENOUS CONTINUOUS
Status: DISCONTINUED | OUTPATIENT
Start: 2023-06-06 | End: 2023-06-06

## 2023-06-06 RX ORDER — METHYLPREDNISOLONE SODIUM SUCCINATE 125 MG/2ML
100 INJECTION, POWDER, LYOPHILIZED, FOR SOLUTION INTRAMUSCULAR; INTRAVENOUS ONCE
Status: COMPLETED | OUTPATIENT
Start: 2023-06-08 | End: 2023-06-08

## 2023-06-06 RX ORDER — FUROSEMIDE 10 MG/ML
INJECTION INTRAMUSCULAR; INTRAVENOUS PRN
Status: DISCONTINUED | OUTPATIENT
Start: 2023-06-06 | End: 2023-06-06

## 2023-06-06 RX ORDER — POTASSIUM CHLORIDE 29.8 MG/ML
20 INJECTION INTRAVENOUS EVERY 8 HOURS PRN
Status: DISCONTINUED | OUTPATIENT
Start: 2023-06-06 | End: 2023-06-10

## 2023-06-06 RX ORDER — PAPAVERINE HYDROCHLORIDE 30 MG/ML
INJECTION INTRAMUSCULAR; INTRAVENOUS PRN
Status: DISCONTINUED | OUTPATIENT
Start: 2023-06-06 | End: 2023-06-07 | Stop reason: HOSPADM

## 2023-06-06 RX ORDER — CHLOROTHIAZIDE SODIUM 500 MG/1
500 INJECTION INTRAVENOUS ONCE
Status: COMPLETED | OUTPATIENT
Start: 2023-06-06 | End: 2023-06-06

## 2023-06-06 RX ORDER — PIPERACILLIN SODIUM, TAZOBACTAM SODIUM 3; .375 G/15ML; G/15ML
3.38 INJECTION, POWDER, LYOPHILIZED, FOR SOLUTION INTRAVENOUS EVERY 6 HOURS
Status: DISCONTINUED | OUTPATIENT
Start: 2023-06-06 | End: 2023-06-06

## 2023-06-06 RX ORDER — NOREPINEPHRINE BITARTRATE 0.02 MG/ML
INJECTION, SOLUTION INTRAVENOUS CONTINUOUS PRN
Status: DISCONTINUED | OUTPATIENT
Start: 2023-06-06 | End: 2023-06-07

## 2023-06-06 RX ORDER — PIPERACILLIN SODIUM, TAZOBACTAM SODIUM 3; .375 G/15ML; G/15ML
3.38 INJECTION, POWDER, LYOPHILIZED, FOR SOLUTION INTRAVENOUS EVERY 6 HOURS
Status: COMPLETED | OUTPATIENT
Start: 2023-06-06 | End: 2023-06-08

## 2023-06-06 RX ORDER — CALCIUM CHLORIDE, MAGNESIUM CHLORIDE, DEXTROSE MONOHYDRATE, LACTIC ACID, SODIUM CHLORIDE, SODIUM BICARBONATE AND POTASSIUM CHLORIDE 5.15; 2.03; 22; 5.4; 6.46; 3.09; .157 G/L; G/L; G/L; G/L; G/L; G/L; G/L
12.5 INJECTION INTRAVENOUS CONTINUOUS
Status: DISCONTINUED | OUTPATIENT
Start: 2023-06-06 | End: 2023-06-06 | Stop reason: HOSPADM

## 2023-06-06 RX ORDER — NICOTINE POLACRILEX 4 MG
15-30 LOZENGE BUCCAL
Status: DISCONTINUED | OUTPATIENT
Start: 2023-06-06 | End: 2023-06-25 | Stop reason: HOSPADM

## 2023-06-06 RX ORDER — MAGNESIUM SULFATE HEPTAHYDRATE 40 MG/ML
2 INJECTION, SOLUTION INTRAVENOUS EVERY 8 HOURS PRN
Status: DISCONTINUED | OUTPATIENT
Start: 2023-06-06 | End: 2023-06-10

## 2023-06-06 RX ORDER — SODIUM CHLORIDE, SODIUM GLUCONATE, SODIUM ACETATE, POTASSIUM CHLORIDE AND MAGNESIUM CHLORIDE 526; 502; 368; 37; 30 MG/100ML; MG/100ML; MG/100ML; MG/100ML; MG/100ML
INJECTION, SOLUTION INTRAVENOUS CONTINUOUS PRN
Status: DISCONTINUED | OUTPATIENT
Start: 2023-06-06 | End: 2023-06-06

## 2023-06-06 RX ORDER — DEXTROSE MONOHYDRATE 25 G/50ML
25-50 INJECTION, SOLUTION INTRAVENOUS
Status: DISCONTINUED | OUTPATIENT
Start: 2023-06-06 | End: 2023-06-25 | Stop reason: HOSPADM

## 2023-06-06 RX ORDER — PIPERACILLIN SODIUM, TAZOBACTAM SODIUM 2; .25 G/10ML; G/10ML
2.25 INJECTION, POWDER, LYOPHILIZED, FOR SOLUTION INTRAVENOUS EVERY 8 HOURS
Status: DISCONTINUED | OUTPATIENT
Start: 2023-06-06 | End: 2023-06-06

## 2023-06-06 RX ORDER — CALCIUM CHLORIDE, MAGNESIUM CHLORIDE, SODIUM CHLORIDE, SODIUM BICARBONATE, POTASSIUM CHLORIDE AND SODIUM PHOSPHATE DIBASIC DIHYDRATE 3.68; 3.05; 6.34; 3.09; .314; .187 G/L; G/L; G/L; G/L; G/L; G/L
12.5 INJECTION INTRAVENOUS CONTINUOUS
Status: DISCONTINUED | OUTPATIENT
Start: 2023-06-06 | End: 2023-06-09

## 2023-06-06 RX ORDER — CALCIUM CARBONATE 500 MG/1
500 TABLET, CHEWABLE ORAL ONCE
Status: COMPLETED | OUTPATIENT
Start: 2023-06-06 | End: 2023-06-06

## 2023-06-06 RX ORDER — CALCIUM GLUCONATE 20 MG/ML
2 INJECTION, SOLUTION INTRAVENOUS EVERY 8 HOURS PRN
Status: DISCONTINUED | OUTPATIENT
Start: 2023-06-06 | End: 2023-06-10

## 2023-06-06 RX ORDER — PROPOFOL 10 MG/ML
INJECTION, EMULSION INTRAVENOUS CONTINUOUS PRN
Status: DISCONTINUED | OUTPATIENT
Start: 2023-06-06 | End: 2023-06-06

## 2023-06-06 RX ORDER — SULFAMETHOXAZOLE AND TRIMETHOPRIM 400; 80 MG/1; MG/1
1 TABLET ORAL
Status: DISCONTINUED | OUTPATIENT
Start: 2023-06-07 | End: 2023-06-06

## 2023-06-06 RX ORDER — TACROLIMUS 1 MG/1
3 CAPSULE ORAL
Status: DISCONTINUED | OUTPATIENT
Start: 2023-06-06 | End: 2023-06-07

## 2023-06-06 RX ORDER — VASOPRESSIN IN 0.9 % NACL 2 UNIT/2ML
SYRINGE (ML) INTRAVENOUS PRN
Status: DISCONTINUED | OUTPATIENT
Start: 2023-06-06 | End: 2023-06-06

## 2023-06-06 RX ORDER — CHLORHEXIDINE GLUCONATE ORAL RINSE 1.2 MG/ML
15 SOLUTION DENTAL EVERY 12 HOURS
Status: DISCONTINUED | OUTPATIENT
Start: 2023-06-06 | End: 2023-06-07

## 2023-06-06 RX ORDER — CALCIUM CHLORIDE 100 MG/ML
INJECTION INTRAVENOUS; INTRAVENTRICULAR PRN
Status: DISCONTINUED | OUTPATIENT
Start: 2023-06-06 | End: 2023-06-06

## 2023-06-06 RX ORDER — DIPHENHYDRAMINE HCL 25 MG
25-50 CAPSULE ORAL ONCE
Status: DISCONTINUED | OUTPATIENT
Start: 2023-06-06 | End: 2023-06-06

## 2023-06-06 RX ORDER — LIDOCAINE HYDROCHLORIDE 20 MG/ML
INJECTION, SOLUTION INFILTRATION; PERINEURAL PRN
Status: DISCONTINUED | OUTPATIENT
Start: 2023-06-06 | End: 2023-06-06

## 2023-06-06 RX ORDER — PAPAVERINE HYDROCHLORIDE 30 MG/ML
INJECTION INTRAMUSCULAR; INTRAVENOUS PRN
Status: DISCONTINUED | OUTPATIENT
Start: 2023-06-06 | End: 2023-06-06 | Stop reason: HOSPADM

## 2023-06-06 RX ORDER — NALOXONE HYDROCHLORIDE 0.4 MG/ML
0.2 INJECTION, SOLUTION INTRAMUSCULAR; INTRAVENOUS; SUBCUTANEOUS
Status: DISCONTINUED | OUTPATIENT
Start: 2023-06-06 | End: 2023-06-25 | Stop reason: HOSPADM

## 2023-06-06 RX ORDER — FLUCONAZOLE 2 MG/ML
400 INJECTION, SOLUTION INTRAVENOUS EVERY 24 HOURS
Status: DISCONTINUED | OUTPATIENT
Start: 2023-06-06 | End: 2023-06-06

## 2023-06-06 RX ORDER — METHYLPREDNISOLONE SODIUM SUCCINATE 125 MG/2ML
50 INJECTION, POWDER, LYOPHILIZED, FOR SOLUTION INTRAMUSCULAR; INTRAVENOUS ONCE
Status: COMPLETED | OUTPATIENT
Start: 2023-06-09 | End: 2023-06-09

## 2023-06-06 RX ORDER — VALGANCICLOVIR 450 MG/1
450 TABLET, FILM COATED ORAL
Status: DISCONTINUED | OUTPATIENT
Start: 2023-06-06 | End: 2023-06-06

## 2023-06-06 RX ORDER — VALGANCICLOVIR 450 MG/1
450 TABLET, FILM COATED ORAL DAILY
Status: DISCONTINUED | OUTPATIENT
Start: 2023-06-07 | End: 2023-06-10

## 2023-06-06 RX ORDER — METOPROLOL TARTRATE 1 MG/ML
INJECTION, SOLUTION INTRAVENOUS PRN
Status: DISCONTINUED | OUTPATIENT
Start: 2023-06-06 | End: 2023-06-06

## 2023-06-06 RX ORDER — DIPHENHYDRAMINE HCL 12.5MG/5ML
25-50 LIQUID (ML) ORAL ONCE
Status: DISCONTINUED | OUTPATIENT
Start: 2023-06-06 | End: 2023-06-06

## 2023-06-06 RX ORDER — PREDNISONE 10 MG/1
10 TABLET ORAL ONCE
Status: DISCONTINUED | OUTPATIENT
Start: 2023-06-11 | End: 2023-06-09

## 2023-06-06 RX ORDER — SULFAMETHOXAZOLE AND TRIMETHOPRIM 400; 80 MG/1; MG/1
1 TABLET ORAL DAILY
Status: DISCONTINUED | OUTPATIENT
Start: 2023-06-07 | End: 2023-06-10

## 2023-06-06 RX ORDER — PROPOFOL 10 MG/ML
5-75 INJECTION, EMULSION INTRAVENOUS CONTINUOUS
Status: DISCONTINUED | OUTPATIENT
Start: 2023-06-06 | End: 2023-06-07

## 2023-06-06 RX ORDER — ACETAMINOPHEN 325 MG/1
650 TABLET ORAL ONCE
Status: DISCONTINUED | OUTPATIENT
Start: 2023-06-06 | End: 2023-06-06

## 2023-06-06 RX ORDER — FENTANYL CITRATE 50 UG/ML
INJECTION, SOLUTION INTRAMUSCULAR; INTRAVENOUS PRN
Status: DISCONTINUED | OUTPATIENT
Start: 2023-06-06 | End: 2023-06-06

## 2023-06-06 RX ORDER — CALCIUM CHLORIDE, MAGNESIUM CHLORIDE, SODIUM CHLORIDE, SODIUM BICARBONATE, POTASSIUM CHLORIDE AND SODIUM PHOSPHATE DIBASIC DIHYDRATE 3.68; 3.05; 6.34; 3.09; .314; .187 G/L; G/L; G/L; G/L; G/L; G/L
INJECTION INTRAVENOUS CONTINUOUS
Status: DISCONTINUED | OUTPATIENT
Start: 2023-06-06 | End: 2023-06-10

## 2023-06-06 RX ORDER — VALGANCICLOVIR HYDROCHLORIDE 50 MG/ML
450 POWDER, FOR SOLUTION ORAL
Status: DISCONTINUED | OUTPATIENT
Start: 2023-06-06 | End: 2023-06-06

## 2023-06-06 RX ORDER — VALGANCICLOVIR HYDROCHLORIDE 50 MG/ML
450 POWDER, FOR SOLUTION ORAL DAILY
Status: DISCONTINUED | OUTPATIENT
Start: 2023-06-07 | End: 2023-06-10

## 2023-06-06 RX ORDER — DEXTROSE MONOHYDRATE 100 MG/ML
INJECTION, SOLUTION INTRAVENOUS CONTINUOUS PRN
Status: DISCONTINUED | OUTPATIENT
Start: 2023-06-06 | End: 2023-06-12

## 2023-06-06 RX ORDER — CALCIUM CHLORIDE, MAGNESIUM CHLORIDE, DEXTROSE MONOHYDRATE, LACTIC ACID, SODIUM CHLORIDE, SODIUM BICARBONATE AND POTASSIUM CHLORIDE 5.15; 2.03; 22; 5.4; 6.46; 3.09; .157 G/L; G/L; G/L; G/L; G/L; G/L; G/L
INJECTION INTRAVENOUS CONTINUOUS
Status: DISCONTINUED | OUTPATIENT
Start: 2023-06-06 | End: 2023-06-06 | Stop reason: HOSPADM

## 2023-06-06 RX ORDER — VERAPAMIL HYDROCHLORIDE 2.5 MG/ML
INJECTION, SOLUTION INTRAVENOUS PRN
Status: DISCONTINUED | OUTPATIENT
Start: 2023-06-06 | End: 2023-06-06 | Stop reason: HOSPADM

## 2023-06-06 RX ADMIN — FENTANYL CITRATE 100 MCG: 50 INJECTION, SOLUTION INTRAMUSCULAR; INTRAVENOUS at 12:57

## 2023-06-06 RX ADMIN — PHENYLEPHRINE HYDROCHLORIDE 200 MCG: 10 INJECTION INTRAVENOUS at 14:05

## 2023-06-06 RX ADMIN — NOREPINEPHRINE BITARTRATE 0.03 MCG/KG/MIN: 1 INJECTION, SOLUTION, CONCENTRATE INTRAVENOUS at 15:09

## 2023-06-06 RX ADMIN — Medication 30 MG: at 13:55

## 2023-06-06 RX ADMIN — Medication 100 MG: at 10:43

## 2023-06-06 RX ADMIN — Medication 20 MG: at 16:17

## 2023-06-06 RX ADMIN — PHENYLEPHRINE HYDROCHLORIDE 100 MCG: 10 INJECTION INTRAVENOUS at 17:55

## 2023-06-06 RX ADMIN — LIDOCAINE HYDROCHLORIDE 80 MG: 20 INJECTION, SOLUTION INFILTRATION; PERINEURAL at 10:43

## 2023-06-06 RX ADMIN — FENTANYL CITRATE 100 MCG: 50 INJECTION, SOLUTION INTRAMUSCULAR; INTRAVENOUS at 14:00

## 2023-06-06 RX ADMIN — PHENYLEPHRINE HYDROCHLORIDE 100 MCG: 10 INJECTION INTRAVENOUS at 19:08

## 2023-06-06 RX ADMIN — MICAFUNGIN SODIUM 100 MG: 50 INJECTION, POWDER, LYOPHILIZED, FOR SOLUTION INTRAVENOUS at 22:39

## 2023-06-06 RX ADMIN — CALCIUM CHLORIDE, MAGNESIUM CHLORIDE, DEXTROSE MONOHYDRATE, LACTIC ACID, SODIUM CHLORIDE, SODIUM BICARBONATE AND POTASSIUM CHLORIDE 12.5 ML/KG/HR: 5.15; 2.03; 22; 5.4; 6.46; 3.09; .157 INJECTION INTRAVENOUS at 11:39

## 2023-06-06 RX ADMIN — Medication 1 UNITS: at 23:06

## 2023-06-06 RX ADMIN — CALCIUM CHLORIDE, MAGNESIUM CHLORIDE, SODIUM CHLORIDE, SODIUM BICARBONATE, POTASSIUM CHLORIDE AND SODIUM PHOSPHATE DIBASIC DIHYDRATE 12.5 ML/KG/HR: 3.68; 3.05; 6.34; 3.09; .314; .187 INJECTION INTRAVENOUS at 22:19

## 2023-06-06 RX ADMIN — PHENYLEPHRINE HYDROCHLORIDE 200 MCG: 10 INJECTION INTRAVENOUS at 14:08

## 2023-06-06 RX ADMIN — VASOPRESSIN 2 UNITS/HR: 20 INJECTION, SOLUTION INTRAMUSCULAR; SUBCUTANEOUS at 23:26

## 2023-06-06 RX ADMIN — CALCIUM CHLORIDE, MAGNESIUM CHLORIDE, DEXTROSE MONOHYDRATE, LACTIC ACID, SODIUM CHLORIDE, SODIUM BICARBONATE AND POTASSIUM CHLORIDE 12.5 ML/KG/HR: 5.15; 2.03; 22; 5.4; 6.46; 3.09; .157 INJECTION INTRAVENOUS at 15:29

## 2023-06-06 RX ADMIN — Medication 50 MCG/HR: at 22:01

## 2023-06-06 RX ADMIN — PHENYLEPHRINE HYDROCHLORIDE 200 MCG: 10 INJECTION INTRAVENOUS at 14:11

## 2023-06-06 RX ADMIN — SODIUM CHLORIDE, SODIUM GLUCONATE, SODIUM ACETATE, POTASSIUM CHLORIDE AND MAGNESIUM CHLORIDE: 526; 502; 368; 37; 30 INJECTION, SOLUTION INTRAVENOUS at 15:31

## 2023-06-06 RX ADMIN — PIPERACILLIN AND TAZOBACTAM 3.38 G: 3; .375 INJECTION, POWDER, FOR SOLUTION INTRAVENOUS at 15:59

## 2023-06-06 RX ADMIN — PHENYLEPHRINE HYDROCHLORIDE 200 MCG: 10 INJECTION INTRAVENOUS at 10:48

## 2023-06-06 RX ADMIN — PIPERACILLIN AND TAZOBACTAM 3.38 G: 3; .375 INJECTION, POWDER, LYOPHILIZED, FOR SOLUTION INTRAVENOUS at 23:45

## 2023-06-06 RX ADMIN — DEXTROSE MONOHYDRATE AND SODIUM CHLORIDE: 5; .45 INJECTION, SOLUTION INTRAVENOUS at 11:12

## 2023-06-06 RX ADMIN — SODIUM CHLORIDE, SODIUM GLUCONATE, SODIUM ACETATE, POTASSIUM CHLORIDE AND MAGNESIUM CHLORIDE: 526; 502; 368; 37; 30 INJECTION, SOLUTION INTRAVENOUS at 19:33

## 2023-06-06 RX ADMIN — NOREPINEPHRINE BITARTRATE 12.8 MCG: 1 INJECTION, SOLUTION, CONCENTRATE INTRAVENOUS at 14:14

## 2023-06-06 RX ADMIN — PROPOFOL 140 MG: 10 INJECTION, EMULSION INTRAVENOUS at 10:43

## 2023-06-06 RX ADMIN — SODIUM CHLORIDE, SODIUM GLUCONATE, SODIUM ACETATE, POTASSIUM CHLORIDE AND MAGNESIUM CHLORIDE: 526; 502; 368; 37; 30 INJECTION, SOLUTION INTRAVENOUS at 10:56

## 2023-06-06 RX ADMIN — NOREPINEPHRINE BITARTRATE 12.8 MCG: 1 INJECTION, SOLUTION, CONCENTRATE INTRAVENOUS at 13:15

## 2023-06-06 RX ADMIN — PHENYLEPHRINE HYDROCHLORIDE 150 MCG: 10 INJECTION INTRAVENOUS at 17:35

## 2023-06-06 RX ADMIN — FENTANYL CITRATE 100 MCG: 50 INJECTION, SOLUTION INTRAMUSCULAR; INTRAVENOUS at 15:04

## 2023-06-06 RX ADMIN — CALCIUM CHLORIDE, MAGNESIUM CHLORIDE, SODIUM CHLORIDE, SODIUM BICARBONATE, POTASSIUM CHLORIDE AND SODIUM PHOSPHATE DIBASIC DIHYDRATE: 3.68; 3.05; 6.34; 3.09; .314; .187 INJECTION INTRAVENOUS at 22:19

## 2023-06-06 RX ADMIN — SODIUM CHLORIDE, SODIUM GLUCONATE, SODIUM ACETATE, POTASSIUM CHLORIDE AND MAGNESIUM CHLORIDE: 526; 502; 368; 37; 30 INJECTION, SOLUTION INTRAVENOUS at 14:10

## 2023-06-06 RX ADMIN — FENTANYL CITRATE 100 MCG: 50 INJECTION, SOLUTION INTRAMUSCULAR; INTRAVENOUS at 12:48

## 2023-06-06 RX ADMIN — NOREPINEPHRINE BITARTRATE 12.8 MCG: 1 INJECTION, SOLUTION, CONCENTRATE INTRAVENOUS at 14:09

## 2023-06-06 RX ADMIN — SUGAMMADEX 200 MG: 100 INJECTION, SOLUTION INTRAVENOUS at 20:23

## 2023-06-06 RX ADMIN — CALCIUM CHLORIDE 1 G: 100 INJECTION, SOLUTION INTRAVENOUS at 12:35

## 2023-06-06 RX ADMIN — CHLORHEXIDINE GLUCONATE 0.12% ORAL RINSE 15 ML: 1.2 LIQUID ORAL at 22:11

## 2023-06-06 RX ADMIN — FENTANYL CITRATE 200 MCG: 50 INJECTION, SOLUTION INTRAMUSCULAR; INTRAVENOUS at 10:43

## 2023-06-06 RX ADMIN — DEXTROSE AND SODIUM CHLORIDE: 5; 450 INJECTION, SOLUTION INTRAVENOUS at 21:42

## 2023-06-06 RX ADMIN — METOPROLOL TARTRATE 1 MG: 5 INJECTION INTRAVENOUS at 14:32

## 2023-06-06 RX ADMIN — CALCIUM CHLORIDE 1 G: 100 INJECTION INTRAVENOUS; INTRAVENTRICULAR at 13:57

## 2023-06-06 RX ADMIN — MANNITOL 25 G: 20 INJECTION, SOLUTION INTRAVENOUS at 16:49

## 2023-06-06 RX ADMIN — CALCIUM CHLORIDE, MAGNESIUM CHLORIDE, DEXTROSE MONOHYDRATE, LACTIC ACID, SODIUM CHLORIDE, SODIUM BICARBONATE AND POTASSIUM CHLORIDE 12.5 ML/KG/HR: 5.15; 2.03; 22; 5.4; 6.46; 3.09; .157 INJECTION INTRAVENOUS at 18:58

## 2023-06-06 RX ADMIN — PIPERACILLIN AND TAZOBACTAM 3.38 G: 3; .375 INJECTION, POWDER, FOR SOLUTION INTRAVENOUS at 13:59

## 2023-06-06 RX ADMIN — PIPERACILLIN AND TAZOBACTAM 3.38 G: 3; .375 INJECTION, POWDER, FOR SOLUTION INTRAVENOUS at 18:09

## 2023-06-06 RX ADMIN — METOPROLOL TARTRATE 1 MG: 5 INJECTION INTRAVENOUS at 17:55

## 2023-06-06 RX ADMIN — PHENYLEPHRINE HYDROCHLORIDE 150 MCG: 10 INJECTION INTRAVENOUS at 19:28

## 2023-06-06 RX ADMIN — METOPROLOL TARTRATE 1 MG: 5 INJECTION INTRAVENOUS at 17:19

## 2023-06-06 RX ADMIN — PHENYLEPHRINE HYDROCHLORIDE 200 MCG: 10 INJECTION INTRAVENOUS at 11:07

## 2023-06-06 RX ADMIN — Medication 1 UNITS: at 12:30

## 2023-06-06 RX ADMIN — PHENYLEPHRINE HYDROCHLORIDE 200 MCG: 10 INJECTION INTRAVENOUS at 10:59

## 2023-06-06 RX ADMIN — PHENYLEPHRINE HYDROCHLORIDE 200 MCG: 10 INJECTION INTRAVENOUS at 10:52

## 2023-06-06 RX ADMIN — CALCIUM CARBONATE (ANTACID) CHEW TAB 500 MG 500 MG: 500 CHEW TAB at 07:45

## 2023-06-06 RX ADMIN — NOREPINEPHRINE BITARTRATE 12.8 MCG: 1 INJECTION, SOLUTION, CONCENTRATE INTRAVENOUS at 14:12

## 2023-06-06 RX ADMIN — SODIUM CHLORIDE, SODIUM GLUCONATE, SODIUM ACETATE, POTASSIUM CHLORIDE AND MAGNESIUM CHLORIDE: 526; 502; 368; 37; 30 INJECTION, SOLUTION INTRAVENOUS at 16:16

## 2023-06-06 RX ADMIN — Medication 30 MG: at 15:09

## 2023-06-06 RX ADMIN — SODIUM CHLORIDE, SODIUM GLUCONATE, SODIUM ACETATE, POTASSIUM CHLORIDE AND MAGNESIUM CHLORIDE: 526; 502; 368; 37; 30 INJECTION, SOLUTION INTRAVENOUS at 17:18

## 2023-06-06 RX ADMIN — PROPOFOL 25 MCG/KG/MIN: 10 INJECTION, EMULSION INTRAVENOUS at 19:15

## 2023-06-06 RX ADMIN — PIPERACILLIN AND TAZOBACTAM 3.38 G: 3; .375 INJECTION, POWDER, FOR SOLUTION INTRAVENOUS at 11:45

## 2023-06-06 RX ADMIN — METOPROLOL TARTRATE 3 MG: 5 INJECTION INTRAVENOUS at 18:39

## 2023-06-06 RX ADMIN — SODIUM BICARBONATE 50 MEQ: 84 INJECTION, SOLUTION INTRAVENOUS at 14:11

## 2023-06-06 RX ADMIN — FUROSEMIDE 100 MG: 10 INJECTION, SOLUTION INTRAVENOUS at 16:49

## 2023-06-06 RX ADMIN — FENTANYL CITRATE 150 MCG: 50 INJECTION, SOLUTION INTRAMUSCULAR; INTRAVENOUS at 16:17

## 2023-06-06 RX ADMIN — SODIUM CHLORIDE, SODIUM GLUCONATE, SODIUM ACETATE, POTASSIUM CHLORIDE AND MAGNESIUM CHLORIDE: 526; 502; 368; 37; 30 INJECTION, SOLUTION INTRAVENOUS at 22:47

## 2023-06-06 RX ADMIN — NOREPINEPHRINE BITARTRATE 12.8 MCG: 1 INJECTION, SOLUTION, CONCENTRATE INTRAVENOUS at 23:14

## 2023-06-06 RX ADMIN — SODIUM CHLORIDE, SODIUM GLUCONATE, SODIUM ACETATE, POTASSIUM CHLORIDE AND MAGNESIUM CHLORIDE: 526; 502; 368; 37; 30 INJECTION, SOLUTION INTRAVENOUS at 23:00

## 2023-06-06 RX ADMIN — CHLOROTHIAZIDE SODIUM 500 MG: 500 INJECTION, POWDER, LYOPHILIZED, FOR SOLUTION INTRAVENOUS at 17:04

## 2023-06-06 RX ADMIN — CALCIUM CHLORIDE, MAGNESIUM CHLORIDE, DEXTROSE MONOHYDRATE, LACTIC ACID, SODIUM CHLORIDE, SODIUM BICARBONATE AND POTASSIUM CHLORIDE: 5.15; 2.03; 22; 5.4; 6.46; 3.09; .157 INJECTION INTRAVENOUS at 11:39

## 2023-06-06 RX ADMIN — NOREPINEPHRINE BITARTRATE 12.8 MCG: 1 INJECTION, SOLUTION, CONCENTRATE INTRAVENOUS at 23:09

## 2023-06-06 RX ADMIN — Medication 20 MG: at 12:24

## 2023-06-06 RX ADMIN — Medication 50 MG: at 22:47

## 2023-06-06 RX ADMIN — FLUCONAZOLE IN SODIUM CHLORIDE 400 MG: 2 INJECTION, SOLUTION INTRAVENOUS at 11:45

## 2023-06-06 RX ADMIN — Medication 1 UNITS: at 14:09

## 2023-06-06 RX ADMIN — FENTANYL CITRATE 100 MCG: 50 INJECTION, SOLUTION INTRAMUSCULAR; INTRAVENOUS at 18:44

## 2023-06-06 RX ADMIN — Medication 30 MG: at 12:01

## 2023-06-06 RX ADMIN — PHENYLEPHRINE HYDROCHLORIDE 100 MCG: 10 INJECTION INTRAVENOUS at 16:22

## 2023-06-06 RX ADMIN — PROPOFOL 45 MCG/KG/MIN: 10 INJECTION, EMULSION INTRAVENOUS at 22:50

## 2023-06-06 RX ADMIN — Medication 1 UNITS: at 13:45

## 2023-06-06 RX ADMIN — Medication 1 UNITS: at 23:15

## 2023-06-06 RX ADMIN — Medication 50 MG: at 18:18

## 2023-06-06 RX ADMIN — Medication 1 UNITS: at 14:12

## 2023-06-06 RX ADMIN — Medication 0.03 MCG/KG/MIN: at 23:11

## 2023-06-06 RX ADMIN — NOREPINEPHRINE BITARTRATE 12.8 MCG: 1 INJECTION, SOLUTION, CONCENTRATE INTRAVENOUS at 14:05

## 2023-06-06 RX ADMIN — METOPROLOL TARTRATE 5 MG: 5 INJECTION INTRAVENOUS at 19:04

## 2023-06-06 RX ADMIN — Medication 30 MG: at 23:50

## 2023-06-06 RX ADMIN — ANTI-THYMOCYTE GLOBULIN (RABBIT) 100 MG: 5 INJECTION, POWDER, LYOPHILIZED, FOR SOLUTION INTRAVENOUS at 15:05

## 2023-06-06 RX ADMIN — METHYLPREDNISOLONE SODIUM SUCCINATE 1000 MG: 1 INJECTION, POWDER, LYOPHILIZED, FOR SOLUTION INTRAMUSCULAR; INTRAVENOUS at 15:01

## 2023-06-06 RX ADMIN — TACROLIMUS 3 MG: 5 CAPSULE ORAL at 22:11

## 2023-06-06 RX ADMIN — VASOPRESSIN 2 UNITS/HR: 20 INJECTION, SOLUTION INTRAMUSCULAR; SUBCUTANEOUS at 11:12

## 2023-06-06 RX ADMIN — METOPROLOL TARTRATE 3 MG: 5 INJECTION INTRAVENOUS at 18:05

## 2023-06-06 RX ADMIN — PHENYLEPHRINE HYDROCHLORIDE 100 MCG: 10 INJECTION INTRAVENOUS at 15:09

## 2023-06-06 RX ADMIN — METOPROLOL TARTRATE 1 MG: 5 INJECTION INTRAVENOUS at 14:52

## 2023-06-06 RX ADMIN — PHENYLEPHRINE HYDROCHLORIDE 200 MCG: 10 INJECTION INTRAVENOUS at 11:15

## 2023-06-06 RX ADMIN — NOREPINEPHRINE BITARTRATE 6.4 MCG: 1 INJECTION, SOLUTION, CONCENTRATE INTRAVENOUS at 18:59

## 2023-06-06 RX ADMIN — Medication 1 UNITS: at 12:31

## 2023-06-06 ASSESSMENT — ACTIVITIES OF DAILY LIVING (ADL)
ADLS_ACUITY_SCORE: 22

## 2023-06-06 ASSESSMENT — ENCOUNTER SYMPTOMS: DYSRHYTHMIAS: 1

## 2023-06-06 ASSESSMENT — COPD QUESTIONNAIRES: COPD: 0

## 2023-06-06 ASSESSMENT — LIFESTYLE VARIABLES: TOBACCO_USE: 0

## 2023-06-06 NOTE — PLAN OF CARE
Goal Outcome Evaluation:    Patient admitted to  this shift for kidney/liver transplant. BPs 140s/100s, other vital signs within normal limits. Adult profile, 2-nurse skin check, EKG, ECHO, CXR and education assessment completed. Care plan added. Labs drawn. Pre-op checklist updated. Wife Nabila will spend the night with patient. OR time scheduled for 9 a.m.

## 2023-06-06 NOTE — PLAN OF CARE
"/84 (BP Location: Right arm)   Pulse 84   Temp 97.4  F (36.3  C) (Oral)   Resp 16   Ht 1.702 m (5' 7\")   Wt 102.3 kg (225 lb 8 oz)   SpO2 100%   BMI 35.32 kg/m      Shift: 3978-2358  VS: Stable on RA, afebrile  Neuro: AOx4  BG: N/A  Labs: WDL  Respiratory: WDL  Pain/Nausea/PRN: Denies pain prior to surgery.  Diet: NPO  LDA: PIV - SL  GI/: UAL  Skin: WDL  Mobility: UAL  Plan: Patient left unit at approx. 11 am for liver/kidney transplant surgery.    Handoff given to following RN.    "

## 2023-06-06 NOTE — ANESTHESIA PROCEDURE NOTES
Airway       Patient location during procedure: OR       Procedure Start/Stop Times: 6/6/2023 10:46 AM  Staff -        Anesthesiologist:  Rekha Lyn MD       Resident/Fellow: Randy Khan MD       Performed By: resident  Consent for Airway        Urgency: elective  Indications and Patient Condition       Indications for airway management: allison-procedural       Induction type:intravenous       Mask difficulty assessment: 1 - vent by mask    Final Airway Details       Final airway type: endotracheal airway       Successful airway: ETT - single and Oral  Endotracheal Airway Details        ETT size (mm): 8.0       Cuffed: yes       Successful intubation technique: direct laryngoscopy       DL Blade Type: MAC 4       Grade View of Cords: 1       Position: Right       Measured from: lips       Secured at (cm): 23       Bite block used: Soft    Post intubation assessment        Placement verified by: capnometry, equal breath sounds and chest rise        Number of attempts at approach: 1       Number of other approaches attempted: 0       Secured with: pink tape       Ease of procedure: easy       Dentition: Intact and Unchanged    Medication(s) Administered   Medication Administration Time: 6/6/2023 10:46 AM

## 2023-06-06 NOTE — PROGRESS NOTES
CRRT INITIATION NOTE    Consent for CRRT Completed:  YES  Patient s Vascular Access: LIJ tunneled Catheter Placement Confirmed: YES  Manufacture:    Model:    Length/Panamanian Size:    Flush Volume:      DATA:  Procedure:  CVVHDF  Start Time:  1139  Machine#:  1  Filter:    Blood Flow:  200  ML/min  Pre-Replacement Solution:  PrismaSol BGK 2/3.5  Post-Replacement Solution:  PrismaSol BGK 2/3.5  Dialysate Solution:  PrismaSol BGK 2/3.5  Pre-Replacement Solution Rate:  1300 mL/hr  Post-Replacement Solution Rate:  200 mL/hr  Dialysate Flow Rate:  1300 mL/hr   Patient Removal Rate:  0 mL/hr  Anticoagulation Type and Rate:  0    ASSESSMENT:  How Patient Tolerated Initiation:   Vital Signs:  HR 77, BP 90/65, O2 sat 98  Initial Pressures:  Access:  -49  Filter:  156  Return:  78  TMP:  28  Change in Filter Pressure:  35      INTERVENTIONS:  Initiated in OR for liver/kidney tx.  LIJ tunneled catheter already in place    PLAN:  Continue fluid removal as tolerated per goals of therapy. Check circuit daily and change circuit q72h and prn. Please contact CRRT resource RN at [phone] with any questions/concerns.

## 2023-06-06 NOTE — PROGRESS NOTES
Admitted/transferred from: home  Time of arrival on unit 1300  2 RN full  skin assessment completed by Jose PADGETT and Nayana RN  Skin assessment finding: issues found +1 BLE edema with mottled coloring, umbilical hernia, left upper chest dialysis catheter    Interventions/actions: skin interventions None needed     Will continue to monitor.

## 2023-06-06 NOTE — ANESTHESIA PROCEDURE NOTES
Central Line/PA Catheter Placement    Pre-Procedure   Staff -        Anesthesiologist:  Rekha Lyn MD       Resident/Fellow: Randy Khan MD       Performed By: resident       Location: OR       Pre-Anesthestic Checklist: patient identified, IV checked, site marked, risks and benefits discussed, informed consent, monitors and equipment checked, pre-op evaluation and at physician/surgeon's request  Timeout:       Correct Patient: Yes        Correct Procedure: Yes        Correct Site: Yes        Correct Position: Yes        Correct Laterality: Yes   Line Placement:   This line was placed Post Induction    Procedure   Procedure: central line       Laterality: right       Insertion Site: external jugular.       Patient Position: Trendelenburg  Sterile Prep        All elements of maximal sterile barrier technique followed       Patient Prep/Sterile Barriers: draped, hand hygiene, gloves , hat , mask , draped, gown, sterile gel and probe cover       Skin prep: Chloraprep  Insertion/Injection        Technique: ultrasound guided and Seldinger Technique        1. Ultrasound was used to evaluate the access site.       2. Vein evaluated via ultrasound for patency/adequacy.       3. Using real-time ultrasound the needle/catheter was observed entering the artery/vein.       5. The visualized structures were anatomically normal.       6. There were no apparent abnormal pathologic findings.       Introducer Type: 9 Fr, 2-lumen MAC        Type: Introducer  Narrative         Secured by: suture       Tegaderm and Biopatch dressing used.       Complications: None apparent,        blood aspirated from all lumens,        All lumens flushed: Yes       Verification method: ASHLEY

## 2023-06-06 NOTE — ANESTHESIA PROCEDURE NOTES
Arterial Line Procedure Note    Pre-Procedure   Staff -        Anesthesiologist:  Rekha Lyn MD       Resident/Fellow: Randy Khan MD       Performed By: resident       Location: OR       Pre-Anesthestic Checklist: patient identified, IV checked, risks and benefits discussed, informed consent, monitors and equipment checked, pre-op evaluation and at physician/surgeon's request  Timeout:       Correct Patient: Yes        Correct Procedure: Yes        Correct Site: Yes        Correct Position: Yes   Line Placement:   This line was placed Post Induction  Procedure   Procedure: arterial line       Laterality: left       Insertion Site: radial.  Sterile Prep        Standard elements of sterile barrier followed       Skin prep: Chloraprep  Insertion/Injection        Technique: Seldinger Technique and ultrasound guided        1. Ultrasound was used to evaluate the access site.       2. Artery evaluated via ultrasound for patency/adequacy.       3. Using real-time ultrasound the needle/catheter was observed entering the artery/vein.       5. The visualized structures were anatomically normal.       6. There were no apparent abnormal pathologic findings.       Catheter Type/Size: 20 G, 12 cm  Narrative         Secured by: suture       Tegaderm dressing used.       Complications: None apparent,        Arterial waveform: Yes        IBP within 10% of NIBP: Yes

## 2023-06-06 NOTE — PLAN OF CARE
[id number]    VS: stable, on room air  BG: none  Labs: pending  Pain/Nausea: c/o upset stomach, requesting tums.  Provider notified.    Diet: NPO since MN  LDA: HD catheter clean dry and intact.  PIV saline locked  GI: + BS  : Pt voids  Skin: bruised and karli  Mobility: Up ad ari in the room.    Plan: Pt took his second cleansing shower.  He is ready for the OR.  Wife at bedside.

## 2023-06-06 NOTE — CONSULTS
SURGICAL ICU ADMISSION NOTE  2023      PRIMARY TEAM: Transplant Surgery  PRIMARY PHYSICIAN: Dr. Clifton  REASON FOR CRITICAL CARE ADMISSION: Close hemodynamic monitoring, mechanical ventilation  ADMITTING PHYSICIAN: Dr. 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 PMHx also includes coronary artery disease, alcohol overuse, obesity, ESRD on HD - (IgA nephropathy + ANCA vasculitis), psoriatic arthritis, paroxysmal atrial fibrillation (on warfarin), DVT, recurrent c diff, and HTN.  He is now s/p  donor liver and kidney transplant on 23 with Dr. Clifton.    Intraop:   ml  500 ml Cell saver returned  4 uPRBC  2uPlt  1 uFFP  5L crystalloid  UOP 40cc prior to kidney, 90cc post kidney    PLAN:    Neurological:  # Acute postsurgical pain   # Hx Alcohol use disorder, sober since 2016  - Monitor neurological status. Delirium preventions and precautions.   - Pain: fentanyl gtt  - Sedation plan: propofol gtt  - RASS goal -2 to -3    Pulmonary:   # Post operative ventilatory support  # Acute hypoxic respiratory failure requiring mechanical ventiation   - VENT: /16/50/5  - ABG pending  - Continue full vent support. PST when meets criteria.  Ventilatory bundle. HOB elevation     Cardiovascular:    #Hx pAfib on warfarin  #Hx CAD  #Hx HTN  #Hypotension  #Shock-distributive   - Monitor hemodynamic status.  Off pressors on arrival to SICU.  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 for NJ placement and tube feeds  - PPI    Fluids/Electrolytes:   #Lactic acidosis  - IVF resucitation, D51/2NaCl @ 100cc/hr  -q4h lactate   -q12h CMP    Renal:  # Hx ESRD on HD MWF (IgA nephropathy + ANCA vasculitis) now s/p DDKT 6/6/23  - nephrology consulted, on CRRT intraop, plan to continue   - makes some urine at baseline. Will continue to monitor intake and output.  - Lux catheter to remain in place per transplant, strict I/O monitoring   - STAT renal US showing no flows through transplanted kidney - plan for return to OR    Endocrine:  # Stress hyperglycemia   -Insulin drip for blood glucose management. Goal to keep BG< 180 for optimal wound healing     ID:  #Stress leukocytosis   #Post op ppx per transplant  -Perioperative antibiotics: Zosyn, micafungin  -Immunosuppression per transplant: thymoglobulin, tacrolimus 3mg BID, methylprednisolone taper  -Prophylactic regimen: Valganciclovir, Bactrim Ppx    Heme:     # Acute blood loss anemia   # Anemia of critical illness and chronic renal disease  # Hx hereditary hemochromatosis   - Transfusion goals: INR <2, Platelets > 20, Fibrinogen > 200, Hgb >8.0  - Hemoglobin 11.6 preop.    Rheumatologic:  #Hx Gout  - hold PTA prednisone 10mg daily    Musculoskeletal:  # Weakness and deconditioning of critical illness   - Physical and occupational therapy consult, appreciate recommendations when appropriate    Skin:  -Diligent skin care to prevent breakdown    General Cares/Prophylaxis:    DVT Prophylaxis: Mechanical, SCDs  GI Prophylaxis: PPI  Restraints: Restraints for medical healing needed: NO    Lines/ tubes/ drains:  -ETT  -PIVs  -left radial Arterial  line  -PTA Left tunneled IJ central line  -Right internal jugular CVC with [place]  -Right EXTERNAL jugular central line  -OG  -Lux catheter  -Intraabdominal MAE drainx1  -incisional wound vac    Disposition:  - Surgical ICU.     Patient seen, findings and plan discussed with surgical ICU staff, Dr. Sorto.    Mya Hennessy MD  General Surgery Resident    - - - - - - - - - - - - - - - - - - - - - - - - - - - - - - - - - - - - - - - - - - - - - -   HISTORY PRESENTING ILLNESS: Damion Quinones is a 67 year old male with a past medical history significant for 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 PMHx also includes coronary artery disease, alcohol overuse, obesity, ESRD on HD M-W-F (IgA nephropathy + ANCA vasculitis), psoriatic arthritis, paroxysmal atrial fibrillation ([id number]), DVT (4/24/23 LE venous duplex showed nonocclusive thrombus seen in the right popliteal vein, near-occlusive thrombus in the posterior tibial vein in the proximal calf, recurrent c diff, and HTN. On warfarin for anticoagulation (switched from apixaban to warfarin due to listed for liver transplant).     Patient has CKD secondary to IgA nephropathy (secondary related to cirrhosis) and recurrent PATI. Treated with rituximab and prednisone. Remains on prednisone 10 mg daily. Patient started on iHD on 1/27/2023. Tunneled HD line placed 1/26/23. Last HD 6/5. Patient makes some urine.      MELD-Na score: 36 at 6/6/2023  4:27 PM  MELD score: 36 at 6/6/2023  4:27 PM  Calculated from:  Serum Creatinine: On dialysis. Using 4 mg/dL.  Serum Sodium: 138 mmol/L (Using max of 137 mmol/L) at 6/6/2023  5:47 AM  Total Bilirubin: 0.6 mg/dL (Using min of 1 mg/dL) at 6/6/2023  5:47 AM  INR(ratio): 4.30 at 6/6/2023  4:27 PM  Age: 67 years    REVIEW OF SYSTEMS: Ros limited due to patient condition    PAST MEDICAL HISTORY:   Past Medical History:    Diagnosis Date     Alcoholic cirrhosis of liver with ascites (H) 10/11/2019     C. difficile colitis      Coronary artery disease     [facility] 4/2023 - complete occlusion of RCA     ESRD (end stage renal disease) on dialysis (H)      History of hemochromatosis 10/11/2019     Hypertension      Obesity      PAF (paroxysmal atrial fibrillation) (H)      Psoriatic arthritis (H)        SURGICAL HISTORY:   Past Surgical History:   Procedure Laterality Date     APPENDECTOMY      Removed at 18 Years Old      COLONOSCOPY      2014 at [facility].      CV CORONARY ANGIOGRAM N/A 4/27/2023    Procedure: Coronary Angiogram;  Surgeon: Pablo Araujo MD;  Location: [facility] CARDIAC CATH LAB     EYE SURGERY Bilateral     Cataract     HERNIA REPAIR      History of bilateral inguinal hernia repair: 10/28/2014. Open hernia repair: 10/2017. Abdominal wound exploration and debridement 12/27/2017     INSERT SHUNT PORTAL TRANSJUGULAR INTRAHEPTIC  09/26/2022     IR CVC TUNNEL PLACEMENT > 5 YRS OF AGE  01/26/2023       SOCIAL HISTORY:   Social History     Tobacco Use     Smoking status: Never     Passive exposure: Never     Smokeless tobacco: Never   Vaping Use     Vaping status: Never Used   Substance Use Topics     Alcohol use: Not Currently     Comment: Last ETOH use was 12/31/2021     Drug use: Not Currently       FAMILY HISTORY:   Family History   Problem Relation Age of Onset     Lung Cancer Mother      Colon Cancer Father      Lung Cancer Brother      Heart Failure Brother      Kidney Disease Brother      ALLERGIES:   No Known Allergies    MEDICATIONS:  No current facility-administered medications on file prior to encounter.  aspirin 81 MG EC tablet, Take 1 tablet (81 mg) by mouth daily  atorvastatin (LIPITOR) 40 MG tablet, Take 1 tablet (40 mg) by mouth daily  B Complex-C-Folic Acid (MAKEDA-KENISHA) TABS, Take 1 tablet by mouth daily  bumetanide (BUMEX) 1 MG tablet, Take 2 tablets (2 mg) by mouth 2 times daily for 180  days  Calcium Carbonate-Vitamin D 500-3.125 MG-MCG TABS, Take 1 tablet by mouth 2 times daily  folic acid (FOLVITE) 1 MG tablet, Take 5 mg by mouth daily  lactulose (CHRONULAC) 10 GM/15ML solution, Take 20 g by mouth 2 times daily  metoprolol succinate ER (TOPROL XL) 25 MG 24 hr tablet, Take 1 tablet by mouth daily at 2 pm  midodrine (PROAMATINE) 2.5 MG tablet, Take 1 tablet (2.5 mg) by mouth daily (Patient taking differently: Take 2.5 mg by mouth daily as needed (hypotension during dialysis))  omeprazole 20 MG tablet, Take 2 tablets (40 mg) by mouth 2 times daily (Patient taking differently: Take 40 mg by mouth daily)  predniSONE (DELTASONE) 10 MG tablet, Take 10 mg by mouth daily  spironolactone (ALDACTONE) 25 MG tablet, Take 25 mg by mouth daily Start when OK from nephrology  XIFAXAN 550 MG TABS tablet, Take 550 mg by mouth 2 times daily  zinc gluconate 50 MG tablet, Take 50 mg by mouth daily  zinc oxide (DESITIN) 40 % paste, Apply topically as needed for irritation apply to buttock to prevent bleeding with wiping  ondansetron (ZOFRAN ODT) 4 MG ODT tab, Take 1 tablet by mouth every 8 hours as needed        PHYSICAL EXAMINATION:  Temp:  [97.4  F (36.3  C)-97.7  F (36.5  C)] 97.4  F (36.3  C)  Pulse:  [72-84] 84  Resp:  [16] 16  BP: (120-130)/(84) 120/84  SpO2:  [100 %] 100 %  General: Intubated, sedated, NAD  HEENT:PERRL, ETT and OG in place  Neck: right EJ+IJ central line  Pulm/Resp: Mechanically ventilated  CV: irregularly irregular rhythm  Abdomen: soft, incision covered with clean bandage, MAE x 1 with s/s output, soft reducible umbilical hernia, RLQ with pravena wound vac  : stauffer catheter in place with hematuria  MSK/Extremities: peripheral edema, peripheral pulses intact, extremities well perfused, SCDs in place  Neuro: Sedated, PERRL    LABS: Reviewed.   Arterial Blood Gases   Recent Labs   Lab 06/06/23  1633 06/06/23  1519 06/06/23  1351 06/06/23  1253   PH 7.42 7.40 7.38 7.37   PCO2 29* 32* 34* 37   PO2  94 124* 141* 116*   HCO3 18* 20* 20* 22     Complete Blood Count   Recent Labs   Lab 06/06/23  1633 06/06/23  1528 06/06/23  1519 06/06/23  1351 06/06/23  1208 06/06/23  0547 06/05/23  1605   WBC  --  10.8  --   --   --  12.1* 9.2   HGB 9.0* 8.4* 8.2* 7.2*   < > 11.6* 10.8*   PLT  --  74*  --   --   --  162 150    < > = values in this interval not displayed.     Basic Metabolic Panel  Recent Labs   Lab 06/06/23  1633 06/06/23  1519 06/06/23  1351 06/06/23  1253 06/06/23  1208 06/06/23  0547 06/05/23  1605    137 138 138   < > 138 139   POTASSIUM 3.4* 3.8 3.8 3.7   < > 4.2 4.1   CHLORIDE  --   --   --   --   --  104 105   CO2  --   --   --   --   --  22 21*   BUN  --   --   --   --   --  50.9* 36.9*   CR  --   --   --   --   --  4.50* 3.85*   * 181* 94 118*   < > 82 101*    < > = values in this interval not displayed.     Liver Function Tests  Recent Labs   Lab 06/06/23  1627 06/06/23  1528 06/06/23  0547 06/05/23  1605   AST  --   --  157*  --    ALT  --   --  76* 84*   ALKPHOS  --   --  203* 207*   BILITOTAL  --   --  0.6 0.6   ALBUMIN  --   --  2.7* 2.9*   INR 4.30* 5.17* 3.06* 3.54*     Pancreatic Enzymes  Recent Labs   Lab 06/05/23  1605   AMYLASE 153*     Coagulation Profile  Recent Labs   Lab 06/06/23  1627 06/06/23  1528 06/06/23  0547 06/05/23  1605   INR 4.30* 5.17* 3.06* 3.54*   PTT  --  64*  --  41*       IMAGING:  No results found for this or any previous visit (from the past 24 hour(s)).

## 2023-06-06 NOTE — ANESTHESIA PROCEDURE NOTES
Central Line/PA Catheter Placement    Pre-Procedure   Staff -        Anesthesiologist:  Rekha Lyn MD       Resident/Fellow: Randy Khan MD       Performed By: resident       Location: OR       Pre-Anesthestic Checklist: patient identified, IV checked, site marked, risks and benefits discussed, informed consent, monitors and equipment checked, pre-op evaluation and at physician/surgeon's request  Timeout:       Correct Patient: Yes        Correct Procedure: Yes        Correct Site: Yes        Correct Position: Yes        Correct Laterality: Yes   Line Placement:   This line was placed Post Induction    Procedure   Procedure: central line       Laterality: right       Insertion Site: internal jugular.       Patient Position: Trendelenburg  Sterile Prep        All elements of maximal sterile barrier technique followed       Patient Prep/Sterile Barriers: draped, hand hygiene, gloves , hat , mask , draped, gown, sterile gel and probe cover       Skin prep: Chloraprep  Insertion/Injection        Technique: ultrasound guided and Seldinger Technique        1. Ultrasound was used to evaluate the access site.       2. Vein evaluated via ultrasound for patency/adequacy.       3. Using real-time ultrasound the needle/catheter was observed entering the artery/vein.       5. The visualized structures were anatomically normal.       6. There were no apparent abnormal pathologic findings.       Introducer Type: 9 Fr, 2-lumen MAC        Type: PA/CVC with Introducer       PA Catheter Type: CCO         Appropriate RV, RA and PA waveforms noted:  Yes            Withdrawn and Locked at cm: 50            Balloon down at end of the procedure:   Narrative         Secured by: suture       Tegaderm and Biopatch dressing used.       Complications: None apparent,        blood aspirated from all lumens,        All lumens flushed: Yes       Verification method: ASHLEY

## 2023-06-06 NOTE — ANESTHESIA PROCEDURE NOTES
Perioperative ASHLEY Procedure Note    Staff -        Anesthesiologist:  Rekha Lyn MD       Resident/Fellow: Randy Khan MD       Performed By: resident  Preanesthesia Checklist:  Patient identified, IV assessed, risks and benefits discussed, monitors and equipment assessed, procedure being performed at surgeon's request and anesthesia consent obtained.    ASHLEY Probe Insertion    Probe Status PRE Insertion: NO obvious damage  Probe type:  Adult 3D  Bite block used:   Soft  Insertion Technique: Jaw Lift  Insertion complications: None obvious  Billing Report:A ASHLEY report is NOT being generated.  Probe Status POST Removal: NO obvious damage

## 2023-06-06 NOTE — PHARMACY-ADMISSION MEDICATION HISTORY
Pharmacist Admission Medication History    Admission medication history is complete. The information provided in this note is only as accurate as the sources available at the time of the update.    Medication reconciliation/reorder completed by provider prior to medication history? No    Information Source(s): Patient and and spouse via phone    Pertinent Information:    PTA medication history completed with patient and patient's spouse, Apoorva    Verified patient's allergies    Verified patient's preferred pharmacy is [facility] in [place], [place]; however, will fill at [place] [facility] on discharge    Warfarin - Current regimen is 3 mg Monday, Wednesday, Friday, and Saturday, and 2 mg all other days. Confirmed last dose yesterday Sunday was 2 mg. Verified regiment with [facility] anticoagulation clinic note on 5/26/23.    Changes made to PTA medication list:    Added:  o Zinc gluconate per patient and patient's spouse report    Deleted: None    Changed:  o Omeprazole from twice daily to daily  o Prednisone from 5 mg tablets to 10 mg tablets  o Midodrine from scheduled to as needed  o Lactulose from 30 g dose to 20 g dose    Medication History Completed By:  Jeremy Haines, PharmD  Pager: [phone]   6/5/2023 7:27 PM    Prior to Admission medications    Medication Sig Last Dose Taking? Auth Provider Long Term End Date   aspirin 81 MG EC tablet Take 1 tablet (81 mg) by mouth daily 6/4/2023 Yes Leventhal, Thomas Michael, MD     atorvastatin (LIPITOR) 40 MG tablet Take 1 tablet (40 mg) by mouth daily 6/5/2023 Yes Paloma Greer, APRN CNP Yes    B Complex-C-Folic Acid (MAKEDA-KENISHA) TABS Take 1 tablet by mouth daily 6/5/2023 Yes Reported, Patient     bumetanide (BUMEX) 1 MG tablet Take 2 tablets (2 mg) by mouth 2 times daily for 180 days 6/5/2023 Yes Gary Serrano MD Yes 9/25/23   Calcium Carbonate-Vitamin D 500-3.125 MG-MCG TABS Take 1 tablet by mouth 2 times daily 6/5/2023 Yes Reported, Patient     folic  acid (FOLVITE) 1 MG tablet Take 5 mg by mouth daily 6/5/2023 Yes Reported, Patient     lactulose (CHRONULAC) 10 GM/15ML solution Take 20 g by mouth 2 times daily 6/5/2023 Yes Reported, Patient     metoprolol succinate ER (TOPROL XL) 25 MG 24 hr tablet Take 1 tablet by mouth daily at 2 pm 6/5/2023 Yes Reported, Patient Yes    midodrine (PROAMATINE) 2.5 MG tablet Take 1 tablet (2.5 mg) by mouth daily  Patient taking differently: Take 2.5 mg by mouth daily as needed (hypotension during dialysis) 6/5/2023 Yes Paloma Greer APRN CNP     omeprazole 20 MG tablet Take 2 tablets (40 mg) by mouth 2 times daily  Patient taking differently: Take 40 mg by mouth daily 6/5/2023 Yes Gary Serrano MD     predniSONE (DELTASONE) 10 MG tablet Take 10 mg by mouth daily 6/5/2023 Yes Reported, Patient     spironolactone (ALDACTONE) 25 MG tablet Take 25 mg by mouth daily Start when OK from nephrology 6/4/2023 Yes Reported, Patient     warfarin ANTICOAGULANT (COUMADIN) 1 MG tablet Take 2-3 tablets (2-3 mg) by mouth daily Adjust dose per INR as directed by ACC 6/4/2023 Yes Gary Serrano MD     XIFAXAN 550 MG TABS tablet Take 550 mg by mouth 2 times daily 6/5/2023 Yes Reported, Patient     zinc gluconate 50 MG tablet Take 50 mg by mouth daily 6/4/2023 Yes Unknown, Entered By History     zinc oxide (DESITIN) 40 % paste Apply topically as needed for irritation apply to buttock to prevent bleeding with wiping More than a month Yes Vi Esteban MD     ondansetron (ZOFRAN ODT) 4 MG ODT tab Take 1 tablet by mouth every 8 hours as needed   Reported, Patient

## 2023-06-07 ENCOUNTER — APPOINTMENT (OUTPATIENT)
Dept: ULTRASOUND IMAGING | Facility: CLINIC | Age: 68
End: 2023-06-07
Attending: INTERNAL MEDICINE
Payer: COMMERCIAL

## 2023-06-07 ENCOUNTER — APPOINTMENT (OUTPATIENT)
Dept: ULTRASOUND IMAGING | Facility: CLINIC | Age: 68
End: 2023-06-07
Attending: TRANSPLANT SURGERY
Payer: COMMERCIAL

## 2023-06-07 ENCOUNTER — DOCUMENTATION ONLY (OUTPATIENT)
Dept: TRANSPLANT | Facility: CLINIC | Age: 68
End: 2023-06-07
Payer: COMMERCIAL

## 2023-06-07 ENCOUNTER — APPOINTMENT (OUTPATIENT)
Dept: GENERAL RADIOLOGY | Facility: CLINIC | Age: 68
End: 2023-06-07
Attending: INTERNAL MEDICINE
Payer: COMMERCIAL

## 2023-06-07 LAB
ALBUMIN SERPL BCG-MCNC: 2 G/DL (ref 3.5–5.2)
ALBUMIN SERPL BCG-MCNC: 2.1 G/DL (ref 3.5–5.2)
ALBUMIN SERPL BCG-MCNC: 2.3 G/DL (ref 3.5–5.2)
ALLEN'S TEST: ABNORMAL
ALP SERPL-CCNC: 81 U/L (ref 40–129)
ALT SERPL W P-5'-P-CCNC: 654 U/L (ref 10–50)
AMYLASE SERPL-CCNC: 90 U/L (ref 28–100)
ANION GAP SERPL CALCULATED.3IONS-SCNC: 12 MMOL/L (ref 7–15)
ANION GAP SERPL CALCULATED.3IONS-SCNC: 12 MMOL/L (ref 7–15)
ANION GAP SERPL CALCULATED.3IONS-SCNC: 14 MMOL/L (ref 7–15)
ANION GAP SERPL CALCULATED.3IONS-SCNC: 16 MMOL/L (ref 7–15)
APTT PPP: 36 SECONDS (ref 22–38)
APTT PPP: 38 SECONDS (ref 22–38)
APTT PPP: 40 SECONDS (ref 22–38)
APTT PPP: 42 SECONDS (ref 22–38)
APTT PPP: 43 SECONDS (ref 22–38)
AST SERPL W P-5'-P-CCNC: 1834 U/L (ref 10–50)
ATRIAL RATE - MUSE: NORMAL BPM
BASE EXCESS BLDA CALC-SCNC: -1.7 MMOL/L (ref -9–1.8)
BASE EXCESS BLDA CALC-SCNC: -1.8 MMOL/L (ref -9–1.8)
BASE EXCESS BLDA CALC-SCNC: -2 MMOL/L (ref -9–1.8)
BASE EXCESS BLDA CALC-SCNC: -4.6 MMOL/L (ref -9.6–2)
BASE EXCESS BLDA CALC-SCNC: -4.7 MMOL/L (ref -9–1.8)
BASE EXCESS BLDV CALC-SCNC: -4 MMOL/L (ref -7.7–1.9)
BASOPHILS # BLD AUTO: 0 10E3/UL (ref 0–0.2)
BASOPHILS NFR BLD AUTO: 0 %
BILIRUB SERPL-MCNC: 1.6 MG/DL
BUN SERPL-MCNC: 26.2 MG/DL (ref 8–23)
BUN SERPL-MCNC: 26.2 MG/DL (ref 8–23)
BUN SERPL-MCNC: 26.4 MG/DL (ref 8–23)
BUN SERPL-MCNC: 32 MG/DL (ref 8–23)
CA-I BLD-MCNC: 4.3 MG/DL (ref 4.4–5.2)
CA-I BLD-MCNC: 4.3 MG/DL (ref 4.4–5.2)
CA-I BLD-MCNC: 4.4 MG/DL (ref 4.4–5.2)
CA-I BLD-MCNC: 4.7 MG/DL (ref 4.4–5.2)
CALCIUM SERPL-MCNC: 7.6 MG/DL (ref 8.8–10.2)
CALCIUM SERPL-MCNC: 7.7 MG/DL (ref 8.8–10.2)
CALCIUM SERPL-MCNC: 7.7 MG/DL (ref 8.8–10.2)
CALCIUM SERPL-MCNC: 7.8 MG/DL (ref 8.8–10.2)
CHLORIDE SERPL-SCNC: 104 MMOL/L (ref 98–107)
CHLORIDE SERPL-SCNC: 105 MMOL/L (ref 98–107)
CHLORIDE SERPL-SCNC: 107 MMOL/L (ref 98–107)
CHLORIDE SERPL-SCNC: 107 MMOL/L (ref 98–107)
CMV IGM SERPL IA-ACNC: <8 AU/ML
CMV IGM SERPL IA-ACNC: NEGATIVE
CREAT SERPL-MCNC: 2.11 MG/DL (ref 0.67–1.17)
CREAT SERPL-MCNC: 2.19 MG/DL (ref 0.67–1.17)
CREAT SERPL-MCNC: 2.19 MG/DL (ref 0.67–1.17)
CREAT SERPL-MCNC: 2.76 MG/DL (ref 0.67–1.17)
CW9: 680
DEPRECATED HCO3 PLAS-SCNC: 17 MMOL/L (ref 22–29)
DEPRECATED HCO3 PLAS-SCNC: 18 MMOL/L (ref 22–29)
DEPRECATED HCO3 PLAS-SCNC: 18 MMOL/L (ref 22–29)
DEPRECATED HCO3 PLAS-SCNC: 20 MMOL/L (ref 22–29)
DIASTOLIC BLOOD PRESSURE - MUSE: NORMAL MMHG
DONOR IDENTIFICATION: NORMAL
DSA COMMENTS: NORMAL
DSA PRESENT: YES
DSA TEST METHOD: NORMAL
EBV VCA IGM SER IA-ACNC: <10 U/ML
EBV VCA IGM SER IA-ACNC: NORMAL
EOSINOPHIL # BLD AUTO: 0 10E3/UL (ref 0–0.7)
EOSINOPHIL NFR BLD AUTO: 0 %
ERYTHROCYTE [DISTWIDTH] IN BLOOD BY AUTOMATED COUNT: 17.9 % (ref 10–15)
ERYTHROCYTE [DISTWIDTH] IN BLOOD BY AUTOMATED COUNT: 18 % (ref 10–15)
ERYTHROCYTE [DISTWIDTH] IN BLOOD BY AUTOMATED COUNT: 18.2 % (ref 10–15)
FIBRINOGEN PPP-MCNC: 209 MG/DL (ref 170–490)
FIBRINOGEN PPP-MCNC: 210 MG/DL (ref 170–490)
FIBRINOGEN PPP-MCNC: 211 MG/DL (ref 170–490)
FIBRINOGEN PPP-MCNC: 278 MG/DL (ref 170–490)
FIBRINOGEN PPP-MCNC: 298 MG/DL (ref 170–490)
GFR SERPL CREATININE-BSD FRML MDRD: 25 ML/MIN/1.73M2
GFR SERPL CREATININE-BSD FRML MDRD: 33 ML/MIN/1.73M2
GFR SERPL CREATININE-BSD FRML MDRD: 33 ML/MIN/1.73M2
GFR SERPL CREATININE-BSD FRML MDRD: 34 ML/MIN/1.73M2
GLUCOSE BLD-MCNC: 165 MG/DL (ref 70–99)
GLUCOSE BLDC GLUCOMTR-MCNC: 102 MG/DL (ref 70–99)
GLUCOSE BLDC GLUCOMTR-MCNC: 110 MG/DL (ref 70–99)
GLUCOSE BLDC GLUCOMTR-MCNC: 120 MG/DL (ref 70–99)
GLUCOSE BLDC GLUCOMTR-MCNC: 121 MG/DL (ref 70–99)
GLUCOSE BLDC GLUCOMTR-MCNC: 129 MG/DL (ref 70–99)
GLUCOSE BLDC GLUCOMTR-MCNC: 130 MG/DL (ref 70–99)
GLUCOSE BLDC GLUCOMTR-MCNC: 141 MG/DL (ref 70–99)
GLUCOSE BLDC GLUCOMTR-MCNC: 144 MG/DL (ref 70–99)
GLUCOSE BLDC GLUCOMTR-MCNC: 166 MG/DL (ref 70–99)
GLUCOSE BLDC GLUCOMTR-MCNC: 170 MG/DL (ref 70–99)
GLUCOSE BLDC GLUCOMTR-MCNC: 177 MG/DL (ref 70–99)
GLUCOSE SERPL-MCNC: 114 MG/DL (ref 70–99)
GLUCOSE SERPL-MCNC: 114 MG/DL (ref 70–99)
GLUCOSE SERPL-MCNC: 125 MG/DL (ref 70–99)
GLUCOSE SERPL-MCNC: 167 MG/DL (ref 70–99)
HBV DNA SERPL QL NAA+PROBE: NORMAL
HCO3 BLD-SCNC: 20 MMOL/L (ref 21–28)
HCO3 BLD-SCNC: 22 MMOL/L (ref 21–28)
HCO3 BLDA-SCNC: 21 MMOL/L (ref 21–28)
HCO3 BLDV-SCNC: 22 MMOL/L (ref 21–28)
HCT VFR BLD AUTO: 26 % (ref 40–53)
HCT VFR BLD AUTO: 26.7 % (ref 40–53)
HCT VFR BLD AUTO: 27.4 % (ref 40–53)
HCV RNA SERPL QL NAA+PROBE: NORMAL
HGB BLD-MCNC: 7.9 G/DL (ref 13.3–17.7)
HGB BLD-MCNC: 8.5 G/DL (ref 13.3–17.7)
HGB BLD-MCNC: 8.6 G/DL (ref 13.3–17.7)
HGB BLD-MCNC: 8.9 G/DL (ref 13.3–17.7)
HGB BLD-MCNC: 8.9 G/DL (ref 13.3–17.7)
HIV1+2 RNA SERPL QL NAA+PROBE: NORMAL
IMM GRANULOCYTES # BLD: 0.1 10E3/UL
IMM GRANULOCYTES NFR BLD: 1 %
INR PPP: 2.02 (ref 0.85–1.15)
INR PPP: 2.06 (ref 0.85–1.15)
INR PPP: 2.18 (ref 0.85–1.15)
INR PPP: 2.43 (ref 0.85–1.15)
INR PPP: 2.59 (ref 0.85–1.15)
INTERPRETATION ECG - MUSE: NORMAL
LACTATE BLD-SCNC: 2.6 MMOL/L
LACTATE SERPL-SCNC: 1.6 MMOL/L (ref 0.7–2)
LACTATE SERPL-SCNC: 1.8 MMOL/L (ref 0.7–2)
LACTATE SERPL-SCNC: 2.1 MMOL/L (ref 0.7–2)
LACTATE SERPL-SCNC: 2.4 MMOL/L (ref 0.7–2)
LACTATE SERPL-SCNC: 3.3 MMOL/L (ref 0.7–2)
LIPASE SERPL-CCNC: 59 U/L (ref 13–60)
LYMPHOCYTES # BLD AUTO: 0 10E3/UL (ref 0.8–5.3)
LYMPHOCYTES NFR BLD AUTO: 0 %
MAGNESIUM SERPL-MCNC: 2 MG/DL (ref 1.7–2.3)
MAGNESIUM SERPL-MCNC: 2.1 MG/DL (ref 1.7–2.3)
MAGNESIUM SERPL-MCNC: 2.1 MG/DL (ref 1.7–2.3)
MCH RBC QN AUTO: 30.1 PG (ref 26.5–33)
MCH RBC QN AUTO: 30.6 PG (ref 26.5–33)
MCH RBC QN AUTO: 30.8 PG (ref 26.5–33)
MCHC RBC AUTO-ENTMCNC: 32.5 G/DL (ref 31.5–36.5)
MCHC RBC AUTO-ENTMCNC: 32.7 G/DL (ref 31.5–36.5)
MCHC RBC AUTO-ENTMCNC: 33.3 G/DL (ref 31.5–36.5)
MCV RBC AUTO: 92 FL (ref 78–100)
MCV RBC AUTO: 93 FL (ref 78–100)
MCV RBC AUTO: 94 FL (ref 78–100)
MONOCYTES # BLD AUTO: 0.2 10E3/UL (ref 0–1.3)
MONOCYTES NFR BLD AUTO: 1 %
NEUTROPHILS # BLD AUTO: 17.9 10E3/UL (ref 1.6–8.3)
NEUTROPHILS NFR BLD AUTO: 98 %
NRBC # BLD AUTO: 0 10E3/UL
NRBC BLD AUTO-RTO: 0 /100
O2/TOTAL GAS SETTING VFR VENT: 21 %
O2/TOTAL GAS SETTING VFR VENT: 40 %
O2/TOTAL GAS SETTING VFR VENT: 40 %
O2/TOTAL GAS SETTING VFR VENT: 50 %
O2/TOTAL GAS SETTING VFR VENT: 50 %
O2/TOTAL GAS SETTING VFR VENT: 60 %
ORGAN: NORMAL
OXYHGB MFR BLDV: 45 % (ref 70–75)
P AXIS - MUSE: NORMAL DEGREES
PCO2 BLD: 31 MM HG (ref 35–45)
PCO2 BLD: 32 MM HG (ref 35–45)
PCO2 BLD: 33 MM HG (ref 35–45)
PCO2 BLD: 33 MM HG (ref 35–45)
PCO2 BLDA: 40 MM HG (ref 35–45)
PCO2 BLDV: 45 MM HG (ref 40–50)
PH BLD: 7.39 [PH] (ref 7.35–7.45)
PH BLD: 7.43 [PH] (ref 7.35–7.45)
PH BLD: 7.44 [PH] (ref 7.35–7.45)
PH BLD: 7.46 [PH] (ref 7.35–7.45)
PH BLDA: 7.33 [PH] (ref 7.35–7.45)
PH BLDV: 7.3 [PH] (ref 7.32–7.43)
PHOSPHATE SERPL-MCNC: 4.7 MG/DL (ref 2.5–4.5)
PHOSPHATE SERPL-MCNC: 4.8 MG/DL (ref 2.5–4.5)
PHOSPHATE SERPL-MCNC: 5.2 MG/DL (ref 2.5–4.5)
PLATELET # BLD AUTO: 130 10E3/UL (ref 150–450)
PLATELET # BLD AUTO: 73 10E3/UL (ref 150–450)
PLATELET # BLD AUTO: 76 10E3/UL (ref 150–450)
PLATELET # BLD AUTO: 81 10E3/UL (ref 150–450)
PO2 BLD: 127 MM HG (ref 80–105)
PO2 BLD: 129 MM HG (ref 80–105)
PO2 BLD: 171 MM HG (ref 80–105)
PO2 BLD: 71 MM HG (ref 80–105)
PO2 BLDA: 85 MM HG (ref 80–105)
PO2 BLDV: 27 MM HG (ref 25–47)
POTASSIUM BLD-SCNC: 3.7 MMOL/L (ref 3.5–5)
POTASSIUM SERPL-SCNC: 4.4 MMOL/L (ref 3.4–5.3)
POTASSIUM SERPL-SCNC: 4.5 MMOL/L (ref 3.4–5.3)
POTASSIUM SERPL-SCNC: 4.5 MMOL/L (ref 3.4–5.3)
POTASSIUM SERPL-SCNC: 4.7 MMOL/L (ref 3.4–5.3)
PR INTERVAL - MUSE: NORMAL MS
PROT SERPL-MCNC: 3.4 G/DL (ref 6.4–8.3)
QRS DURATION - MUSE: 76 MS
QT - MUSE: 388 MS
QTC - MUSE: 436 MS
R AXIS - MUSE: 48 DEGREES
RBC # BLD AUTO: 2.76 10E6/UL (ref 4.4–5.9)
RBC # BLD AUTO: 2.91 10E6/UL (ref 4.4–5.9)
RBC # BLD AUTO: 2.96 10E6/UL (ref 4.4–5.9)
SA 1 CELL: NORMAL
SA 1 TEST METHOD: NORMAL
SA 2 CELL: NORMAL
SA 2 TEST METHOD: NORMAL
SA1 HI RISK ABY: NORMAL
SA1 MOD RISK ABY: NORMAL
SA2 HI RISK ABY: NORMAL
SA2 MOD RISK ABY: NORMAL
SODIUM BLD-SCNC: 137 MMOL/L (ref 133–144)
SODIUM SERPL-SCNC: 137 MMOL/L (ref 136–145)
SODIUM SERPL-SCNC: 137 MMOL/L (ref 136–145)
SODIUM SERPL-SCNC: 138 MMOL/L (ref 136–145)
SODIUM SERPL-SCNC: 138 MMOL/L (ref 136–145)
SYSTOLIC BLOOD PRESSURE - MUSE: NORMAL MMHG
T AXIS - MUSE: -24 DEGREES
TACROLIMUS BLD-MCNC: 2.7 UG/L (ref 5–15)
TME LAST DOSE: ABNORMAL H
TME LAST DOSE: ABNORMAL H
UNACCEPTABLE ANTIGENS: NORMAL
VENTRICULAR RATE- MUSE: 76 BPM
WBC # BLD AUTO: 14.4 10E3/UL (ref 4–11)
WBC # BLD AUTO: 16.4 10E3/UL (ref 4–11)
WBC # BLD AUTO: 18.2 10E3/UL (ref 4–11)
ZZZSA 1  COMMENTS: NORMAL
ZZZSA 2 COMMENTS: NORMAL

## 2023-06-07 PROCEDURE — 999N000253 HC STATISTIC WEANING TRIALS

## 2023-06-07 PROCEDURE — 82247 BILIRUBIN TOTAL: CPT | Performed by: SURGERY

## 2023-06-07 PROCEDURE — 84075 ASSAY ALKALINE PHOSPHATASE: CPT | Performed by: SURGERY

## 2023-06-07 PROCEDURE — 76776 US EXAM K TRANSPL W/DOPPLER: CPT | Mod: 26 | Performed by: RADIOLOGY

## 2023-06-07 PROCEDURE — 85025 COMPLETE CBC W/AUTO DIFF WBC: CPT | Performed by: SURGERY

## 2023-06-07 PROCEDURE — 83735 ASSAY OF MAGNESIUM: CPT | Performed by: SURGERY

## 2023-06-07 PROCEDURE — 85027 COMPLETE CBC AUTOMATED: CPT | Performed by: SURGERY

## 2023-06-07 PROCEDURE — 250N000009 HC RX 250

## 2023-06-07 PROCEDURE — 250N000012 HC RX MED GY IP 250 OP 636 PS 637: Performed by: SURGERY

## 2023-06-07 PROCEDURE — 76776 US EXAM K TRANSPL W/DOPPLER: CPT

## 2023-06-07 PROCEDURE — 85610 PROTHROMBIN TIME: CPT | Performed by: SURGERY

## 2023-06-07 PROCEDURE — 82330 ASSAY OF CALCIUM: CPT | Performed by: SURGERY

## 2023-06-07 PROCEDURE — 250N000013 HC RX MED GY IP 250 OP 250 PS 637

## 2023-06-07 PROCEDURE — 258N000003 HC RX IP 258 OP 636

## 2023-06-07 PROCEDURE — 250N000011 HC RX IP 250 OP 636

## 2023-06-07 PROCEDURE — 999N000045 HC STATISTIC DAILY SWAN MONITORING

## 2023-06-07 PROCEDURE — 258N000003 HC RX IP 258 OP 636: Performed by: NURSE PRACTITIONER

## 2023-06-07 PROCEDURE — 999N000157 HC STATISTIC RCP TIME EA 10 MIN

## 2023-06-07 PROCEDURE — 85610 PROTHROMBIN TIME: CPT

## 2023-06-07 PROCEDURE — 82803 BLOOD GASES ANY COMBINATION: CPT | Performed by: SURGERY

## 2023-06-07 PROCEDURE — 250N000011 HC RX IP 250 OP 636: Performed by: NURSE PRACTITIONER

## 2023-06-07 PROCEDURE — 94003 VENT MGMT INPAT SUBQ DAY: CPT

## 2023-06-07 PROCEDURE — 74018 RADEX ABDOMEN 1 VIEW: CPT | Mod: 26 | Performed by: RADIOLOGY

## 2023-06-07 PROCEDURE — 99291 CRITICAL CARE FIRST HOUR: CPT | Mod: 24 | Performed by: SURGERY

## 2023-06-07 PROCEDURE — 999N000015 HC STATISTIC ARTERIAL MONITORING DAILY

## 2023-06-07 PROCEDURE — 250N000013 HC RX MED GY IP 250 OP 250 PS 637: Performed by: TRANSPLANT SURGERY

## 2023-06-07 PROCEDURE — 250N000011 HC RX IP 250 OP 636: Performed by: SURGERY

## 2023-06-07 PROCEDURE — 250N000009 HC RX 250: Performed by: TRANSPLANT SURGERY

## 2023-06-07 PROCEDURE — 76776 US EXAM K TRANSPL W/DOPPLER: CPT | Mod: 77

## 2023-06-07 PROCEDURE — 93975 VASCULAR STUDY: CPT

## 2023-06-07 PROCEDURE — 84132 ASSAY OF SERUM POTASSIUM: CPT | Performed by: SURGERY

## 2023-06-07 PROCEDURE — 999N000155 HC STATISTIC RAPCV CVP MONITORING

## 2023-06-07 PROCEDURE — 80197 ASSAY OF TACROLIMUS: CPT | Performed by: SURGERY

## 2023-06-07 PROCEDURE — 82805 BLOOD GASES W/O2 SATURATION: CPT | Performed by: TRANSPLANT SURGERY

## 2023-06-07 PROCEDURE — 83605 ASSAY OF LACTIC ACID: CPT | Performed by: SURGERY

## 2023-06-07 PROCEDURE — 85049 AUTOMATED PLATELET COUNT: CPT | Performed by: SURGERY

## 2023-06-07 PROCEDURE — 85730 THROMBOPLASTIN TIME PARTIAL: CPT | Performed by: SURGERY

## 2023-06-07 PROCEDURE — 258N000003 HC RX IP 258 OP 636: Performed by: SURGERY

## 2023-06-07 PROCEDURE — 250N000009 HC RX 250: Performed by: SURGERY

## 2023-06-07 PROCEDURE — 85018 HEMOGLOBIN: CPT | Performed by: SURGERY

## 2023-06-07 PROCEDURE — 250N000013 HC RX MED GY IP 250 OP 250 PS 637: Performed by: NURSE PRACTITIONER

## 2023-06-07 PROCEDURE — 84155 ASSAY OF PROTEIN SERUM: CPT | Performed by: SURGERY

## 2023-06-07 PROCEDURE — 80069 RENAL FUNCTION PANEL: CPT | Performed by: SURGERY

## 2023-06-07 PROCEDURE — 99232 SBSQ HOSP IP/OBS MODERATE 35: CPT | Mod: 24 | Performed by: TRANSPLANT SURGERY

## 2023-06-07 PROCEDURE — 84460 ALANINE AMINO (ALT) (SGPT): CPT | Performed by: SURGERY

## 2023-06-07 PROCEDURE — C9113 INJ PANTOPRAZOLE SODIUM, VIA: HCPCS | Performed by: SURGERY

## 2023-06-07 PROCEDURE — 999N000065 XR ABDOMEN PORT 1 VIEW

## 2023-06-07 PROCEDURE — 250N000012 HC RX MED GY IP 250 OP 636 PS 637: Performed by: TRANSPLANT SURGERY

## 2023-06-07 PROCEDURE — 83690 ASSAY OF LIPASE: CPT | Performed by: SURGERY

## 2023-06-07 PROCEDURE — 93975 VASCULAR STUDY: CPT | Mod: 26 | Performed by: RADIOLOGY

## 2023-06-07 PROCEDURE — 200N000002 HC R&B ICU UMMC

## 2023-06-07 PROCEDURE — 250N000013 HC RX MED GY IP 250 OP 250 PS 637: Performed by: SURGERY

## 2023-06-07 PROCEDURE — 82150 ASSAY OF AMYLASE: CPT | Performed by: SURGERY

## 2023-06-07 PROCEDURE — 99223 1ST HOSP IP/OBS HIGH 75: CPT | Mod: FS

## 2023-06-07 PROCEDURE — 3E043XZ INTRODUCTION OF VASOPRESSOR INTO CENTRAL VEIN, PERCUTANEOUS APPROACH: ICD-10-PCS | Performed by: STUDENT IN AN ORGANIZED HEALTH CARE EDUCATION/TRAINING PROGRAM

## 2023-06-07 PROCEDURE — 82803 BLOOD GASES ANY COMBINATION: CPT

## 2023-06-07 PROCEDURE — 250N000009 HC RX 250: Performed by: ANESTHESIOLOGY

## 2023-06-07 PROCEDURE — 85384 FIBRINOGEN ACTIVITY: CPT | Performed by: SURGERY

## 2023-06-07 PROCEDURE — 84100 ASSAY OF PHOSPHORUS: CPT | Performed by: SURGERY

## 2023-06-07 RX ORDER — URSODIOL 250 MG/1
250 TABLET, FILM COATED ORAL 2 TIMES DAILY
Status: DISCONTINUED | OUTPATIENT
Start: 2023-06-09 | End: 2023-06-18

## 2023-06-07 RX ORDER — NOREPINEPHRINE BITARTRATE 0.06 MG/ML
.01-.1 INJECTION, SOLUTION INTRAVENOUS CONTINUOUS
Status: DISCONTINUED | OUTPATIENT
Start: 2023-06-07 | End: 2023-06-08

## 2023-06-07 RX ORDER — MAGNESIUM SULFATE HEPTAHYDRATE 40 MG/ML
4 INJECTION, SOLUTION INTRAVENOUS ONCE
Status: COMPLETED | OUTPATIENT
Start: 2023-06-07 | End: 2023-06-07

## 2023-06-07 RX ORDER — OXYCODONE HYDROCHLORIDE 5 MG/1
5-10 TABLET ORAL EVERY 4 HOURS PRN
Status: DISCONTINUED | OUTPATIENT
Start: 2023-06-07 | End: 2023-06-08

## 2023-06-07 RX ORDER — LIDOCAINE HYDROCHLORIDE 20 MG/ML
5 SOLUTION OROPHARYNGEAL ONCE
Status: COMPLETED | OUTPATIENT
Start: 2023-06-07 | End: 2023-06-07

## 2023-06-07 RX ORDER — DIPHENHYDRAMINE HCL 12.5MG/5ML
25-50 LIQUID (ML) ORAL ONCE
Status: COMPLETED | OUTPATIENT
Start: 2023-06-07 | End: 2023-06-07

## 2023-06-07 RX ORDER — ACETAMINOPHEN 325 MG/1
650 TABLET ORAL ONCE
Status: COMPLETED | OUTPATIENT
Start: 2023-06-07 | End: 2023-06-07

## 2023-06-07 RX ORDER — HYDROMORPHONE HCL IN WATER/PF 6 MG/30 ML
.2-.4 PATIENT CONTROLLED ANALGESIA SYRINGE INTRAVENOUS
Status: DISCONTINUED | OUTPATIENT
Start: 2023-06-07 | End: 2023-06-08

## 2023-06-07 RX ORDER — VERAPAMIL HYDROCHLORIDE 2.5 MG/ML
INJECTION, SOLUTION INTRAVENOUS PRN
Status: DISCONTINUED | OUTPATIENT
Start: 2023-06-07 | End: 2023-06-07 | Stop reason: HOSPADM

## 2023-06-07 RX ORDER — NOREPINEPHRINE BITARTRATE 0.06 MG/ML
.01-.6 INJECTION, SOLUTION INTRAVENOUS CONTINUOUS
Status: DISCONTINUED | OUTPATIENT
Start: 2023-06-07 | End: 2023-06-07

## 2023-06-07 RX ORDER — DIPHENHYDRAMINE HCL 25 MG
25-50 CAPSULE ORAL ONCE
Status: COMPLETED | OUTPATIENT
Start: 2023-06-07 | End: 2023-06-07

## 2023-06-07 RX ORDER — B COMPLEX C NO.10/FOLIC ACID 900MCG/5ML
5 LIQUID (ML) ORAL DAILY
Status: DISCONTINUED | OUTPATIENT
Start: 2023-06-07 | End: 2023-06-12

## 2023-06-07 RX ORDER — MYCOPHENOLATE MOFETIL 250 MG/1
750 CAPSULE ORAL
Status: DISCONTINUED | OUTPATIENT
Start: 2023-06-07 | End: 2023-06-07

## 2023-06-07 RX ORDER — MYCOPHENOLATE MOFETIL 200 MG/ML
750 POWDER, FOR SUSPENSION ORAL
Status: DISCONTINUED | OUTPATIENT
Start: 2023-06-07 | End: 2023-06-10

## 2023-06-07 RX ORDER — ASPIRIN 325 MG
325 TABLET ORAL DAILY
Status: DISCONTINUED | OUTPATIENT
Start: 2023-06-07 | End: 2023-06-07

## 2023-06-07 RX ORDER — ASPIRIN 325 MG
325 TABLET ORAL DAILY
Status: DISCONTINUED | OUTPATIENT
Start: 2023-06-08 | End: 2023-06-18

## 2023-06-07 RX ORDER — DEXTROSE MONOHYDRATE 100 MG/ML
INJECTION, SOLUTION INTRAVENOUS CONTINUOUS PRN
Status: DISCONTINUED | OUTPATIENT
Start: 2023-06-07 | End: 2023-06-20

## 2023-06-07 RX ADMIN — Medication 5 ML: at 16:56

## 2023-06-07 RX ADMIN — TACROLIMUS 3 MG: 5 CAPSULE ORAL at 09:22

## 2023-06-07 RX ADMIN — PIPERACILLIN AND TAZOBACTAM 3.38 G: 3; .375 INJECTION, POWDER, LYOPHILIZED, FOR SOLUTION INTRAVENOUS at 11:40

## 2023-06-07 RX ADMIN — SODIUM CHLORIDE 200 MG: 9 INJECTION, SOLUTION INTRAVENOUS at 09:23

## 2023-06-07 RX ADMIN — SULFAMETHOXAZOLE AND TRIMETHOPRIM 1 TABLET: 400; 80 TABLET ORAL at 09:22

## 2023-06-07 RX ADMIN — MYCOPHENOLATE MOFETIL 750 MG: 200 POWDER, FOR SUSPENSION ORAL at 18:05

## 2023-06-07 RX ADMIN — PANTOPRAZOLE SODIUM 40 MG: 40 INJECTION, POWDER, FOR SOLUTION INTRAVENOUS at 09:23

## 2023-06-07 RX ADMIN — Medication 12.5 MG: at 16:56

## 2023-06-07 RX ADMIN — CALCIUM CHLORIDE, MAGNESIUM CHLORIDE, SODIUM CHLORIDE, SODIUM BICARBONATE, POTASSIUM CHLORIDE AND SODIUM PHOSPHATE DIBASIC DIHYDRATE 12.5 ML/KG/HR: 3.68; 3.05; 6.34; 3.09; .314; .187 INJECTION INTRAVENOUS at 21:13

## 2023-06-07 RX ADMIN — CALCIUM CHLORIDE, MAGNESIUM CHLORIDE, SODIUM CHLORIDE, SODIUM BICARBONATE, POTASSIUM CHLORIDE AND SODIUM PHOSPHATE DIBASIC DIHYDRATE 12.5 ML/KG/HR: 3.68; 3.05; 6.34; 3.09; .314; .187 INJECTION INTRAVENOUS at 05:41

## 2023-06-07 RX ADMIN — CALCIUM CHLORIDE, MAGNESIUM CHLORIDE, SODIUM CHLORIDE, SODIUM BICARBONATE, POTASSIUM CHLORIDE AND SODIUM PHOSPHATE DIBASIC DIHYDRATE 12.5 ML/KG/HR: 3.68; 3.05; 6.34; 3.09; .314; .187 INJECTION INTRAVENOUS at 09:37

## 2023-06-07 RX ADMIN — CALCIUM GLUCONATE 2 G: 20 INJECTION, SOLUTION INTRAVENOUS at 18:05

## 2023-06-07 RX ADMIN — OXYCODONE HYDROCHLORIDE 5 MG: 5 TABLET ORAL at 17:47

## 2023-06-07 RX ADMIN — CALCIUM GLUCONATE 2 G: 20 INJECTION, SOLUTION INTRAVENOUS at 06:38

## 2023-06-07 RX ADMIN — MYCOPHENOLATE MOFETIL 750 MG: 200 POWDER, FOR SUSPENSION ORAL at 09:23

## 2023-06-07 RX ADMIN — HUMAN INSULIN 3 UNITS/HR: 100 INJECTION, SOLUTION SUBCUTANEOUS at 08:16

## 2023-06-07 RX ADMIN — LIDOCAINE HYDROCHLORIDE 5 ML: 20 SOLUTION ORAL; TOPICAL at 11:40

## 2023-06-07 RX ADMIN — ACETAMINOPHEN 650 MG: 325 TABLET, FILM COATED ORAL at 19:57

## 2023-06-07 RX ADMIN — CALCIUM CHLORIDE, MAGNESIUM CHLORIDE, SODIUM CHLORIDE, SODIUM BICARBONATE, POTASSIUM CHLORIDE AND SODIUM PHOSPHATE DIBASIC DIHYDRATE 12.5 ML/KG/HR: 3.68; 3.05; 6.34; 3.09; .314; .187 INJECTION INTRAVENOUS at 13:25

## 2023-06-07 RX ADMIN — CALCIUM CHLORIDE, MAGNESIUM CHLORIDE, SODIUM CHLORIDE, SODIUM BICARBONATE, POTASSIUM CHLORIDE AND SODIUM PHOSPHATE DIBASIC DIHYDRATE 12.5 ML/KG/HR: 3.68; 3.05; 6.34; 3.09; .314; .187 INJECTION INTRAVENOUS at 17:20

## 2023-06-07 RX ADMIN — CHLORHEXIDINE GLUCONATE 0.12% ORAL RINSE 15 ML: 1.2 LIQUID ORAL at 09:22

## 2023-06-07 RX ADMIN — ASPIRIN 81 MG CHEWABLE TABLET 81 MG: 81 TABLET CHEWABLE at 09:22

## 2023-06-07 RX ADMIN — CALCIUM CHLORIDE, MAGNESIUM CHLORIDE, SODIUM CHLORIDE, SODIUM BICARBONATE, POTASSIUM CHLORIDE AND SODIUM PHOSPHATE DIBASIC DIHYDRATE 12.5 ML/KG/HR: 3.68; 3.05; 6.34; 3.09; .314; .187 INJECTION INTRAVENOUS at 13:24

## 2023-06-07 RX ADMIN — CALCIUM CHLORIDE, MAGNESIUM CHLORIDE, SODIUM CHLORIDE, SODIUM BICARBONATE, POTASSIUM CHLORIDE AND SODIUM PHOSPHATE DIBASIC DIHYDRATE 12.5 ML/KG/HR: 3.68; 3.05; 6.34; 3.09; .314; .187 INJECTION INTRAVENOUS at 21:14

## 2023-06-07 RX ADMIN — CALCIUM CHLORIDE, MAGNESIUM CHLORIDE, SODIUM CHLORIDE, SODIUM BICARBONATE, POTASSIUM CHLORIDE AND SODIUM PHOSPHATE DIBASIC DIHYDRATE 12.5 ML/KG/HR: 3.68; 3.05; 6.34; 3.09; .314; .187 INJECTION INTRAVENOUS at 17:18

## 2023-06-07 RX ADMIN — CALCIUM CHLORIDE, MAGNESIUM CHLORIDE, SODIUM CHLORIDE, SODIUM BICARBONATE, POTASSIUM CHLORIDE AND SODIUM PHOSPHATE DIBASIC DIHYDRATE 12.5 ML/KG/HR: 3.68; 3.05; 6.34; 3.09; .314; .187 INJECTION INTRAVENOUS at 09:36

## 2023-06-07 RX ADMIN — SODIUM CHLORIDE, POTASSIUM CHLORIDE, SODIUM LACTATE AND CALCIUM CHLORIDE 1000 ML: 600; 310; 30; 20 INJECTION, SOLUTION INTRAVENOUS at 05:56

## 2023-06-07 RX ADMIN — PIPERACILLIN AND TAZOBACTAM 3.38 G: 3; .375 INJECTION, POWDER, LYOPHILIZED, FOR SOLUTION INTRAVENOUS at 05:50

## 2023-06-07 RX ADMIN — MAGNESIUM SULFATE IN WATER 4 G: 40 INJECTION, SOLUTION INTRAVENOUS at 16:56

## 2023-06-07 RX ADMIN — PROPOFOL 40 MCG/KG/MIN: 10 INJECTION, EMULSION INTRAVENOUS at 04:00

## 2023-06-07 RX ADMIN — ANTI-THYMOCYTE GLOBULIN (RABBIT) 100 MG: 5 INJECTION, POWDER, LYOPHILIZED, FOR SOLUTION INTRAVENOUS at 20:22

## 2023-06-07 RX ADMIN — Medication 1 UNITS: at 00:34

## 2023-06-07 RX ADMIN — DIPHENHYDRAMINE HYDROCHLORIDE 50 MG: 25 CAPSULE ORAL at 19:57

## 2023-06-07 RX ADMIN — VALGANCICLOVIR 450 MG: 50 FOR SOLUTION ORAL at 09:22

## 2023-06-07 RX ADMIN — PIPERACILLIN AND TAZOBACTAM 3.38 G: 3; .375 INJECTION, POWDER, LYOPHILIZED, FOR SOLUTION INTRAVENOUS at 17:47

## 2023-06-07 RX ADMIN — SUGAMMADEX 200 MG: 100 INJECTION, SOLUTION INTRAVENOUS at 01:25

## 2023-06-07 ASSESSMENT — ACTIVITIES OF DAILY LIVING (ADL)
ADLS_ACUITY_SCORE: 28
ADLS_ACUITY_SCORE: 30
ADLS_ACUITY_SCORE: 30
ADLS_ACUITY_SCORE: 22
ADLS_ACUITY_SCORE: 30
ADLS_ACUITY_SCORE: 28
ADLS_ACUITY_SCORE: 22
ADLS_ACUITY_SCORE: 28
ADLS_ACUITY_SCORE: 22
ADLS_ACUITY_SCORE: 22
ADLS_ACUITY_SCORE: 32
ADLS_ACUITY_SCORE: 22

## 2023-06-07 ASSESSMENT — VISUAL ACUITY
OU: BASELINE
OU: BASELINE

## 2023-06-07 NOTE — ANESTHESIA CARE TRANSFER NOTE
Patient: Damion Quinones    Procedure: Procedure(s):  Return liver transplant. Intra-operative ultrasound       Diagnosis: Bleeding [R58]  Diagnosis Additional Information: No value filed.    Anesthesia Type:   General     Note:    Oropharynx: oral gastic tube in place, bite block in place, ventilatory support and endotracheal tube in place  Level of Consciousness: iatrogenic sedation    Level of Supplemental Oxygen (L/min / FiO2): 100  Independent Airway: airway patency not satisfactory and stable  Dentition: dentition unchanged  Vital Signs Stable: post-procedure vital signs reviewed and stable  Report to RN Given: handoff report given  Patient transferred to: ICU    ICU Handoff: Call for PAUSE to initiate/utilize ICU HANDOFF, Identified Patient, Identified Responsible Provider, Reviewed the Pertinent Medical History, Discussed Surgical Course, Reviewed Intra-OP Anesthesia Management and Issues during Anesthesia, Set Expectations for Post Procedure Period and Allowed Opportunity for Questions and Acknowledgement of Understanding      Vitals:  Vitals Value Taken Time   /78 06/07/23 0136   Temp     Pulse 83 06/07/23 0138   Resp     SpO2 99 % 06/07/23 0138   Vitals shown include unvalidated device data.    Electronically Signed By: JADE Méndez CRNA  June 7, 2023  1:39 AM

## 2023-06-07 NOTE — TELEPHONE ENCOUNTER
Organ Offer Encounter Information    Organ Offer Information  Organ offer date & time: 6/5/2023  4:44 AM  Coordinator/Fellow/Attending name: Adeline Jc RN   Organ(s):  Organ UNOS ID Match Run ID Comment Organ Laterality   Liver [id number] MNOP    Kidney [id number] MNOP       Recent infections?: Yes (Comment: Cdiff Feb 2023, resolved) Recent IV antibiotics?: Neg   New medications?: No Recent pregnancy?: No   Angicoagulation medications?: Yes Recent vaccinations?: Yes (Comment: Hepatitis booster vaccine at dialysis 1 month ago)   Recent blood transfusions?: No Recent hospitalizations?: Yes (Comment: Encephalopathy 4/2023)   Has your insurance changed in the last 6-12 months?: Neg    Patient last dialyzed: 6/5/2023  5:30 AM  Dialysis type: Hemo  Discussed organ offer with: Patient  Patient/Caregiver name: Damion  Discussed risk category with Patient/Other: N/A  Patient/Other asked to speak to a surgeon?: No  Discussed program-specific outcomes: Did not have questions regarding SRTR  Right to decline organ offer without penalty, Patient/Other: Aware of option to decline without penalty  Organ offer decision status Patient/Other: Accepted Offer  Organ disposition: Transplanted  Additional Comments:   6/5/2023 4:27 AM  Liver: DBD, Local  MD: Etienne LINCOLNO Contact: Nuzhat ([phone])  Donor/Recip HCV Status: Negative  (HCV+ Donors - Discuss HCV genotyping/quant testing with MD & send Epic in basket message to [facility] HCV POOL - Include Donor UNOS ID)  Donor Nutritional Status: pending  Plan (NPO, Donor OR): VXM ordered per Dr. Singh.  Will plan to call patient today (6/5/23) and admit pending VXM results. If accepts- will need to hold Warfrin and plan to be NPO.   - - -   COVID Screening  In the past month, have you:  Or anyone close to you had a positive COVID test or suspected to have COVID: No  Had any COVID symptoms (Fever, Cough, Short of Breath, Loss of Taste/Smell, Rash): No      VXM  ordered 0427 per Dr. Singh. Will draw FXM upon admission.   Discussed incoming transplant with 7A charge, Soraida- will hold bed after a pt discharges at 11 am.   Adeline Jc RN  Transplant Coordinator    6/5/2023 9:14 AM:   Spoke to 7A charge, they are working on a bed for patient to be admitted for preop liver transplant. Spoke to [place] ([phone]), [facility] contact for today, donor OR set for 0900 6/6/23. Spoke to Casey's spouse, Apoorva, he is currently at dialysis and will return call when he gets home in about an hour.  Lorna Mancilla RN  Transplant Coordinator     Admissions: Nithya 0923  Unit: Erin 0948  Update Provider Entering Orders (XM Plan & COVID Testing):  Shannon Delacruz 1007  Immunology: Karlee 0954  Inpatient Lab (COVID Testing [phone], Option 2):   Book OR: J[facility]   Vessel Storage Confirmation (PA/SILAS/JENNIFER): OK to bank  Blood Bank: Rosemary Mccrary, CC: [id number]- confirmed laterality, left kidney, will not be pumped.  Research: ON HOLD    TransNet/ABO Verification: printed Liver/L kidney labels 1342, confirmed receipt with Zenobia in OR.  Add Organ: Liver added 6/6 0726, Left Kidney added 6/6/1350.  YRIS Jc RN    6/5/2023 10:03 AM:   Spoke to Casey & Apoorva, reviewed offer. Patient accepted, plan for admission between [id number]. Spoke to Charge Erin PADGETT, OK for Apoorva to stay overnight tonight with patient.   Loran Mancilla RN  Transplant Coordinator       Donor OR Time: 6/5 0900  Procuring MD: Dr Ratliff  Contact in the OR: Karlene [phone]  Organs Being Procured: Heart, liver, kidneys, pancreas  Flush Solution: UW  Biopsy: Possibility of liver bx, kidney bx does not meet criteria  Pump: Does not meet criteria  Special Requests (Special blood tubes, nodes, waivers): No  MD for Visualization: Dr Clifton  Transportation Details: NA    Attestation I have discussed all of the above with the Patient/Legal Guardian/Caregiver regarding this organ offer.: Yes  Coordinator/Fellow/Attending name:  Lorna Mancilla, RN

## 2023-06-07 NOTE — ANESTHESIA CARE TRANSFER NOTE
Patient: Damion Quinones    Procedure: Procedure(s):  Transplant liver recipient  donor  Transplant kidney recipient  donor, ureteral stent placement       Diagnosis: End stage liver disease (H) [K72.10]  End stage renal disease (H) [N18.6]  Diagnosis Additional Information: No value filed.    Anesthesia Type:   General     Note:    Oropharynx: endotracheal tube in place  Level of Consciousness: iatrogenic sedation    Level of Supplemental Oxygen (L/min / FiO2): 15  Independent Airway: airway patency not satisfactory and stable  Dentition: dentition unchanged  Vital Signs Stable: post-procedure vital signs reviewed and stable  Report to RN Given: handoff report given  Patient transferred to: ICU  Comments: Remained hemodynamically stable without pressors during transport to ICU. Hand ventilated without issue. Signout given to ICU RN and primary ICU team. No changes in cardiopulmonary status upon arrival to unit.   ICU Handoff: Call for PAUSE to initiate/utilize ICU HANDOFF, Identified Patient, Identified Responsible Provider, Reviewed the Pertinent Medical History, Discussed Surgical Course, Reviewed Intra-OP Anesthesia Management and Issues during Anesthesia, Set Expectations for Post Procedure Period and Allowed Opportunity for Questions and Acknowledgement of Understanding      Vitals:  Vitals Value Taken Time   /99 23   Temp     Pulse 129 230   Resp 0 23   SpO2 100 % 23   Vitals shown include unvalidated device data.    Electronically Signed By: Randy Khan MD  2023  8:41 PM

## 2023-06-07 NOTE — PROGRESS NOTES
Immunosuppression Management Note:    Damion Quinones is a 67 year old male who is seen today  for immunosuppression management     I, Chad Clifton MD, I have examined the patient with our GEORGE/Fellow as part of a shared visit.   I participated in the rounds,  discussed and agree with the note and findings and  reviewed today's vital signs, medications, labs and imaging as noted in this note.  I  reviewed the  immunosuppression medications.  I personally provided a substantive portion of the care of this patient. I personally performed the immunosuppressive management of this patient, reviewed the overall  immunosuppression including drug levels, allograft function and provided the recommendations to adjust the dose to provide optimal levels to prevent rejection of the allograft and prevent toxicity to the organs. This was complex care due to the fresh allograft.   Time spent: evaluating patient, examining patient, discussion of plan, counseling and documentation: >35 min   I spoke to the patient/family and explained below clinical details and answered all the questions    Transplant Surgery  Inpatient Daily Progress Note  06/07/2023    Assessment & Plan:   67 year old male with PMHx of decompensated alcohol + hemochromatosis (homozygous H63D) cirrhosis complicated by variceal bleeding s/p TIPS (10/1/2022) and hepatic encephalopathy + CKD (IgA nephropathy + ANCA vasculitis) s/p simultaneous liver kidney transplant on 6/6/2023. RTOR on 6/6/203 with concern for low flow in the renal graft - ex-lap, washout, closure with healthy appearing graft with intact arterial and venous flow.     s/p DBD simultaneous liver kidney transplant on 6/6/2023 with complex reconstruction of accessory right hepatic artery. POD #1  * RTOR on 6/6/203 with concern for low flow in the renal graft - ex-lap, washout, closure with healthy appearing graft with intact arterial and venous flow.     Liver studies elevated at 600s and 1800s for  ALT and AST respectively along with total bilirubin of 1.6 and normal alkaline phosphatase   * Plan for liver transplant ultrasound with doppler on 2023  - ASA 325mg daily   - Ursodiol 250mg BID    Immunosuppression  - Thymoglobulin: s/p 1mg/kg on  and 1mg/kg on . Plan for 1mg/kg daily for a total of 6mg/kg.  - Steroid taper per protocol  - Hold tacrolimus given donor had ATN and patient has slow renal recovery with low UOP post operatively  - MMF 750mg BID    Stent: No biliary stent was placed  Drains: 1 MAE drain in place + prevena wound vac    H/o CKD related IgA nephropathy and ANCA vasculitis S/p  donor renal transplant on 2023 with ureteral/J stent placement  - Plan for renal ultrasound w/doppler on 2023  - Continue CRRT with goal removal: 0 to 50cc/hr, but goal generally net even with goal for minimal vasopressor support as able.  - : Lux to remain at this time for close urinary output monitoring    Hematology:   Leukocytosis: Patient with leukocytosis to 18.2 with neutrophilic predominant in the setting of steroid use  Acute on chronic anemia: Hgb 7-9 in the setting of post-operative blood loss. No overt s/sx of blood loss.   Thrombocytopenia: Low platelets, .     Coagulopathy: INR 2.43 today, fibrinogen 211    Cardiac:   Coronary artery disease: Continue ASA 81mg daily. Recommend statin resumption in the outpatient setting.   Paroxysmal atrial fibrillation: Hold anti-coagulation and metoprolol.    * When INR normalizes, will plan to start heparin gtt   Hypertension prior to admission: No ongoing vasopressor support.     Respiratory:  Acute hypoxic respiratory failure in the setting of post-operative state: Currently requiring mechanical ventilatory support with plan for pressure support trials when able.     GI/Nutrition:   Nutrition: Recommend naso-jejunal tube feeding placement with tube feeding per nutrition.     Endocrine:   Post-operative elevated blood glucoses:  HgA1c 6.1% (6/6/2023)  - Insulin gtt    Rheumatology:  Psoriatic arthritis: Prior to admission was on prednisone 10mg daily, on prednisone taper as above. Goal to discharge on prednisone 5mg; may consider cortisol stimulation test given long term steroid use    Neurologic  Acute post operative pain: Fentanyl gtt.   Alcohol use disorder: Continue sober supports post transplant. Continue complete sobriety.    Prophylaxis: Mechanical DVT ppx , fall, GI (PPI BID), fungal (micafungin), CMV (valganciclovir, CMV IgG Ab +), PJP (bactrim), zosyn x 48 hours.     Disposition: Ongoing MICU cares given CRRT and mechanical ventilation     GEORGE/Fellow/Resident Provider: Lolis Bermudez MD     Faculty: Dr. Clifton    __________________________________________________________________  Transplant History:  Transplants:6/6/2023 (Liver), 6/6/2023 (Kidney)   Postoperative day: 1 (Liver), 1 (Kidney)     Interval History:   Overnight events: No acute events overnight. Afebrile  - Ongoing mechanical ventilation  - Ongoing CRRT  - Sedated, unable to complete ROS    ROS:   A 10-point review of systems was negative except as noted above.    Curent Meds:   aspirin  81 mg Oral Daily    chlorhexidine  15 mL Mouth/Throat Q12H    lidocaine (viscous)  5 mL Topical Once    [START ON 6/8/2023] methylPREDNISolone  100 mg Intravenous Once    Followed by    [START ON 6/9/2023] methylPREDNISolone  50 mg Intravenous Once    Followed by    [START ON 6/10/2023] predniSONE  25 mg Oral Once    Followed by    [START ON 6/11/2023] predniSONE  10 mg Oral Once    micafungin  100 mg Intravenous Q24H    mycophenolate  750 mg Oral BID IS    pantoprazole  40 mg Per Feeding Tube QAM AC    Or    pantoprazole  40 mg Intravenous QAM AC    piperacillin-tazobactam  3.375 g Intravenous Q6H    sodium chloride (PF)  3 mL Intravenous Q8H    sulfamethoxazole-trimethoprim  1 tablet Oral Daily    tacrolimus  3 mg Oral BID IS    Or    tacrolimus  3 mg Oral or NG  "Tube BID IS    valGANciclovir  450 mg Oral Daily    Or    valGANciclovir  450 mg Oral or NG Tube Daily       Physical Exam:     Admit Weight: 102.3 kg (225 lb 8 oz)    Current Vitals:   BP (!) 83/62   Pulse 83   Temp 97  F (36.1  C) (Axillary)   Resp 16   Ht 1.702 m (5' 7\")   Wt 117.6 kg (259 lb 4.2 oz)   SpO2 99%   BMI 40.61 kg/m      CVP (mmHg): 11 mmHg    Vital sign ranges:    Temp:  [97  F (36.1  C)-99.2  F (37.3  C)] 97  F (36.1  C)  Pulse:  [] 83  Resp:  [16-23] 16  BP: ()/(61-99) 83/62  MAP:  [52 mmHg-95 mmHg] 94 mmHg  Arterial Line BP: ()/(39-78) 123/76  FiO2 (%):  [40 %-50 %] 40 %  SpO2:  [99 %-100 %] 99 %  Patient Vitals for the past 24 hrs:   BP Temp Temp src Pulse Resp SpO2 Weight   06/07/23 1015 -- -- -- 83 -- 99 % --   06/07/23 1000 -- -- -- 86 -- 100 % --   06/07/23 0945 -- -- -- 92 -- 99 % --   06/07/23 0930 (!) 83/62 -- -- 86 -- 100 % --   06/07/23 0929 98/62 -- -- 87 -- 100 % --   06/07/23 0915 (!) 86/61 -- -- 94 -- 99 % --   06/07/23 0900 -- -- -- 95 -- 100 % --   06/07/23 0845 -- -- -- 82 -- 100 % --   06/07/23 0830 -- -- -- 96 -- 100 % --   06/07/23 0815 -- -- -- 83 -- 100 % --   06/07/23 0800 -- 97  F (36.1  C) Axillary 90 16 100 % 117.6 kg (259 lb 4.2 oz)   06/07/23 0730 -- -- -- 85 -- 100 % --   06/07/23 0715 -- -- -- 96 -- 100 % --   06/07/23 0700 -- -- -- 89 16 100 % --   06/07/23 0645 -- -- -- 90 -- 100 % --   06/07/23 0630 -- -- -- 95 -- 100 % --   06/07/23 0625 -- -- -- 91 -- 100 % --   06/07/23 0615 -- -- -- 89 -- 100 % --   06/07/23 0600 -- -- -- 104 20 100 % --   06/07/23 0550 -- -- -- 110 -- 100 % --   06/07/23 0545 -- -- -- 90 -- 100 % --   06/07/23 0530 -- 98.8  F (37.1  C) Pulm Art 89 -- 100 % --   06/07/23 0520 -- -- -- 90 -- 100 % --   06/07/23 0515 -- -- -- 85 -- 100 % --   06/07/23 0500 -- -- -- 86 -- 100 % --   06/07/23 0445 -- -- -- 88 -- 100 % --   06/07/23 0440 -- -- -- 78 -- 100 % --   06/07/23 0430 -- -- -- 83 -- 100 % --   06/07/23 0423 -- -- -- " 77 -- 100 % --   06/07/23 0415 -- -- -- 74 -- 100 % --   06/07/23 0404 -- -- -- 77 -- 100 % --   06/07/23 0400 -- 97.4  F (36.3  C) Axillary 76 16 100 % --   06/07/23 0345 -- -- -- 73 -- 100 % --   06/07/23 0330 -- -- -- 68 -- 100 % --   06/07/23 0315 -- -- -- 74 -- 100 % --   06/07/23 0305 -- -- -- 75 -- 100 % --   06/07/23 0300 -- -- -- 74 16 100 % --   06/07/23 0258 -- -- -- 73 -- 100 % --   06/07/23 0250 -- -- -- 80 -- 100 % --   06/07/23 0248 -- -- -- 77 -- 100 % --   06/07/23 0245 -- -- -- 84 -- 100 % --   06/07/23 0230 -- -- -- 81 -- 100 % --   06/07/23 0225 -- -- -- 69 -- 100 % --   06/07/23 0223 -- -- -- 74 -- 100 % --   06/07/23 0216 -- -- -- 82 -- 100 % --   06/07/23 0215 -- -- -- 81 -- 100 % --   06/07/23 0200 -- -- -- 76 -- 99 % --   06/07/23 0135 100/78 -- -- 84 -- 100 % --   06/06/23 2250 107/75 -- -- (!) 121 -- 100 % --   06/06/23 2245 94/74 -- -- 118 -- 100 % --   06/06/23 2240 109/80 -- -- (!) 130 -- 100 % --   06/06/23 2235 102/80 -- -- (!) 122 -- 100 % --   06/06/23 2230 113/84 -- -- 114 -- 100 % --   06/06/23 2225 100/70 -- -- 117 -- 100 % --   06/06/23 2220 107/77 -- -- 109 -- 100 % --   06/06/23 2215 112/79 -- -- 117 -- 100 % --   06/06/23 2210 110/77 -- -- (!) 121 -- 100 % --   06/06/23 2205 105/79 -- -- 112 -- 100 % --   06/06/23 2200 109/75 -- -- 118 18 100 % --   06/06/23 2155 109/80 -- -- (!) 124 -- 100 % --   06/06/23 2150 108/74 -- -- (!) 122 -- 100 % --   06/06/23 2145 107/80 -- -- 120 -- 100 % --   06/06/23 2140 92/62 -- -- 115 -- 100 % --   06/06/23 2135 101/66 -- -- 119 -- 100 % --   06/06/23 2130 (!) 89/64 -- -- (!) 127 -- 100 % 111.9 kg (246 lb 11.1 oz)   06/06/23 2125 93/73 -- -- (!) 127 -- 100 % --   06/06/23 2120 104/67 -- -- (!) 130 -- 100 % --   06/06/23 2115 101/72 -- -- 117 -- 100 % --   06/06/23 2105 103/69 -- -- 114 -- 100 % --   06/06/23 2100 -- -- -- 108 22 100 % --   06/06/23 2058 -- -- -- -- 23 -- --   06/06/23 2045 -- -- -- (!) 121 21 100 % --   06/06/23 2030 (!)  "135/99 99.2  F (37.3  C) Axillary 119 20 100 % --     VS:  BP (!) 83/62   Pulse 83   Temp 97  F (36.1  C) (Axillary)   Resp 16   Ht 1.702 m (5' 7\")   Wt 117.6 kg (259 lb 4.2 oz)   SpO2 99%   BMI 40.61 kg/m      Wt:   Wt Readings from Last 2 Encounters:   06/07/23 117.6 kg (259 lb 4.2 oz)   05/19/23 103.4 kg (228 lb)      Constitutional: Intubated, sedated  Eyes: Conjunctiva anicteric  HEENT: Intubated  CV: NSR, no murmurs  Respiratory: Mechanically ventilated, CTAB  Abd: Obese. Surgical scar covered with prevena. + MAE drain w/serosang drainage.   Skin: No jaundice  MSK: 2+ Mima edema bilaterally  Neuro: Sedated  - HD access: RIJ tunneled catehter    Data:   CMP  Recent Labs   Lab 06/07/23  1058 06/07/23  1000 06/07/23  0449 06/07/23  0341 06/07/23  0337 06/07/23  0330 06/07/23  0131 06/07/23  0028 06/06/23  2213 06/06/23  2108 06/06/23  1208 06/06/23  0547 06/05/23  1605 06/05/23  1605   NA  --   --   --   --   --  138  --  137   < > 142   < > 138  --  139   POTASSIUM  --   --   --   --   --  4.4  --  3.7   < > 5.0   < > 4.2  --  4.1   CHLORIDE  --   --   --   --   --  105  --   --   --  107  --  104  --  105   CO2  --   --   --   --   --  17*  --   --   --  20*  --  22  --  21*   * 110*   < >  --    < > 167*   < > 165*   < > 195*   < > 82  --  101*   BUN  --   --   --   --   --  32.0*  --   --   --  32.6*  --  50.9*  --  36.9*   CR  --   --   --   --   --  2.76*  --   --   --  2.76*  --  4.50*  --  3.85*   GFRESTIMATED  --   --   --   --   --  25*  --   --   --  25*  --  14*  --  17*   NAZARIO  --   --   --   --   --  7.6*  --   --   --  8.6*  --  8.8  --  8.7*   ICAW  --   --   --  4.3*  --   --   --  4.7   < >  --    < >  --   --   --    MAG  --   --   --   --   --  2.1  --   --   --   --   --  2.3  --  2.2   PHOS  --   --   --   --   --  4.7*  --   --   --   --   --  4.6*  --  3.4   AMYLASE  --   --   --   --   --  90  --   --   --   --   --   --   --  153*   LIPASE  --   --   --   --   --  59  --   --  "  --   --   --   --   --   --    ALBUMIN  --   --   --   --   --  2.0*  --   --   --  2.1*  --  2.7*  --  2.9*   BILITOTAL  --   --   --   --   --  1.6*  --   --   --  1.7*  --  0.6  --  0.6   ALKPHOS  --   --   --   --   --  81  --   --   --  99  --  203*  --  207*   AST  --   --   --   --   --  1,834*  --   --   --  1,276*  --  157*   < >  --    ALT  --   --   --   --   --  654*  --   --   --  624*  --  76*  --  84*    < > = values in this interval not displayed.     CBC  Recent Labs   Lab 06/07/23 0758 06/07/23  0330 06/06/23  2315 06/06/23  2108   HGB 7.9* 8.5*   < > 9.1*   WBC  --  18.2*  --  12.3*   PLT 73* 130*  --  114*   A1C  --   --   --  5.1    < > = values in this interval not displayed.     COAGS  Recent Labs   Lab 06/07/23 0758 06/07/23  0330   INR 2.43* 2.02*   PTT 42* 40*

## 2023-06-07 NOTE — PLAN OF CARE
Night shift [id number]    D/I/A:    Returned from OR at 0135 (See 's Brief Op note).  Patient went back to OR due to ultrasound finding.     called and updated patient's wife, Apoorva, after OR.    Arrived from OR with propofol, fentanyl, levophed, vasopressin, and inuslin gtts running.    Neuro-  Weaned off propofol and fentanyl gtts due to patient only having a weak to fair cough and no other response to stimulus.  By 0520 patient opened eyes to voice, moved all extremities spontaneously, followed basic commands (wiggled toes bilaterally and moved bilateral hands to command).  Propofol and fentanyl gtts were put back on due to agitation and gagging on ETT.    CV-  Upon return from OR at 0135 PA waveform was dampened and by 0600 unable to obtain reading (swan remained at 50 and never moved), RT was notified and was unable to re-zero CCO monitor (CO=3.8 and CI=1.8, unsure if CO & CI were accurate since unable to recalibrate CCO monitor  was notified of this), wedge syringe was always clamped, air was pulled out of wedge syringe and the PA waveform did not change,  was notified.  Per  she will notify SICU day team and swan will probably be pulled today.    CVP=9  LR bolus 21 liter given since LA still in the 3's per .    Weaned off levophed and vasopressin.    -  On CRRT with goal of I=O.  Removal rate kept at zero due to being on pressors and LA=3's.    ENDO-  BG monitored and insulin gtt titrated.    Labs-  Labs monitored and reviewed.  Calcium replaced.    ROUTINE ICU LABS (Last four results)  CMPRecent Labs   Lab 06/07/23  0619 06/07/23  0449 06/07/23  0337 06/07/23  0330 06/07/23  0131 06/07/23  0028 06/06/23  2315 06/06/23  2213 06/06/23  2108 06/06/23  1208 06/06/23  0547 06/05/23  1605   NA  --   --   --  138  --  137 136  --  142   < > 138 139   POTASSIUM  --   --   --  4.4  --  3.7 3.7  --  5.0   < > 4.2 4.1   CHLORIDE  --   --   --  105  --   --   --   --   107  --  104 105   CO2  --   --   --  17*  --   --   --   --  20*  --  22 21*   ANIONGAP  --   --   --  16*  --   --   --   --  15  --  12 13   * 166* 170* 167*   < > 165* 177*   < > 195*   < > 82 101*   BUN  --   --   --  32.0*  --   --   --   --  32.6*  --  50.9* 36.9*   CR  --   --   --  2.76*  --   --   --   --  2.76*  --  4.50* 3.85*   GFRESTIMATED  --   --   --  25*  --   --   --   --  25*  --  14* 17*   NAZARIO  --   --   --  7.6*  --   --   --   --  8.6*  --  8.8 8.7*   MAG  --   --   --  2.1  --   --   --   --   --   --  2.3 2.2   PHOS  --   --   --  4.7*  --   --   --   --   --   --  4.6* 3.4   PROTTOTAL  --   --   --  3.4*  --   --   --   --  3.4*  --  5.1* 5.1*   ALBUMIN  --   --   --  2.0*  --   --   --   --  2.1*  --  2.7* 2.9*   BILITOTAL  --   --   --  1.6*  --   --   --   --  1.7*  --  0.6 0.6   ALKPHOS  --   --   --  81  --   --   --   --  99  --  203* 207*   AST  --   --   --  1,834*  --   --   --   --  1,276*  --  157*  --    ALT  --   --   --  654*  --   --   --   --  624*  --  76* 84*    < > = values in this interval not displayed.     CBC  Recent Labs   Lab 06/07/23  0330 06/07/23  0028 06/06/23  2315 06/06/23  2108 06/06/23  1633 06/06/23  1528 06/06/23  1208 06/06/23  0547   WBC 18.2*  --   --  12.3*  --  10.8  --  12.1*   RBC 2.76*  --   --  3.02*  --  2.77*  --  3.80*   HGB 8.5* 8.6* 9.2* 9.1*   < > 8.4*   < > 11.6*   HCT 26.0*  --   --  28.1*  --  26.2*  --  37.3*   MCV 94  --   --  93  --  95  --  98   MCH 30.8  --   --  30.1  --  30.3  --  30.5   MCHC 32.7  --   --  32.4  --  32.1  --  31.1*   RDW 18.2*  --   --  17.7*  --  17.0*  --  17.5*   *  --   --  114*  --  74*  --  162    < > = values in this interval not displayed.     INR  Recent Labs   Lab 06/07/23  0330 06/06/23  2108 06/06/23  1627 06/06/23  1528   INR 2.02* 2.17* 4.30* 5.17*     Arterial Blood Gas  Recent Labs   Lab 06/07/23  0341 06/07/23  0330 06/07/23  0028 06/06/23  2315 06/06/23  2223 06/06/23  2109   PH  7.39  --  7.33* 7.35  --  7.36   PCO2 33*  --  40 39  --  38   PO2 171*  --  85 198*  --  211*   HCO3 20*  --  21 21  --  21   O2PER 50 50 60.0 63.0   < > 50    < > = values in this interval not displayed.     0330 IDG=248  0341 ICa==4.3, LA=3.3    No blood products given overnight.    P:  Continue to monitor.  Plan is for another renal and live ultrasound today.  See flowsheets for details.      Goal Outcome Evaluation:   Plan of Care Reviewed With: patient  Overall Patient Progress: no change

## 2023-06-07 NOTE — BRIEF OP NOTE
[facility]    Brief Operative Note    Pre-operative diagnosis: End stage liver disease (H) [K72.10]  End stage renal disease (H) [N18.6]  Post-operative diagnosis Same as pre-operative diagnosis    Procedure: Procedure(s):  Transplant liver recipient  donor, bile duct stent placement  Transplant kidney recipient  donor, ureteral stent placement  Surgeon: Surgeon(s) and Role:     * Chad Clifton MD - Primary     * Mamadou Hunt MD - Resident - Assisting     * Subhash Singh MD - Fellow - Assisting  Anesthesia: General   Estimated Blood Loss: 700 mL    Drains: French-Mar  Specimens:   ID Type Source Tests Collected by Time Destination   1 : Donor gallbladder Tissue Gallbladder SURGICAL PATHOLOGY EXAM Chad Clifton MD 2023  3:14 PM    2 : Native liver and gallbladder Tissue Liver SURGICAL PATHOLOGY EXAM Chad Clifton MD 2023  4:45 PM      Findings:   cirrhotic liver.  Complications: None.  Implants:   Implant Name Type Inv. Item Serial No.  Lot No. LRB No. Used Action   SET STNT 12CM 6FR SFLX SM 2 PGTL CRV CDE URET SS PU [place] - [id number] Stent SET STNT 12CM 6FR SFLX SM 2 PGTL CRV CDE URET SS PU [place]  [employer] [id number] N/A 1 Implanted   SET STNT 12CM 6FR SFLX SM 2 PGTL CRV CDE URET SS PU [place]L - [id number] Stent SET STNT 12CM 6FR SFLX SM 2 PGTL CRV CDE URET SS PU NYL  [employer] [id number] N/A 1 Implanted

## 2023-06-07 NOTE — PROGRESS NOTES
Pt extubated at 1405 to 5 lpm NC. Inline, subglottic and oral suction prior. Cuff leak present. Clear breath sounds and good productive cough. No stridor heard.   Shwetha Conte, RRT

## 2023-06-07 NOTE — PROCEDURES
Small Bowel Feeding Tube Placement Assessment  Reason for Feeding Tube Placement: post pyloric enteral access for nutrition and medication administration  Cortrak Start Time: 1130   Cortrak End Time: 1210  Medicine Delivered During Procedure: 2% viscous lidocaine gel   Placement Successful: No, presumed gastric   Procedure Complications: kinking and/or coiling of feeding tube  Final Placement Vamshi at exit of nare:  84 cm  Face to Face time with patient: 40 minutes  Bridle Placement:   Reason for bridle placement: securement of nasoenteric feeding tube    Medicine delivered during procedure: lidocaine gel   Procedure: Successful   Location of top of clip on FT: @ 85 cm marker   Condition of nose/skin at time of bridle placement: Unremarkable  Face to Face time with patient: < 5 minutes.    Rekha Mcdonough RD, LD, CNSC  4A SICU RD pager: [phone]  Ascom: [phone]  Weekend/Holiday RD pager [phone]

## 2023-06-07 NOTE — ANESTHESIA PREPROCEDURE EVALUATION
Anesthesia Pre-Procedure Evaluation    Patient: Damion Quinones   MRN: [id number] : 1955        Procedure : Procedure(s):  Transplant liver recipient  donor  Transplant kidney recipient  donor          Past Medical History:   Diagnosis Date     Alcoholic cirrhosis of liver with ascites (H) 10/11/2019     C. difficile colitis      Coronary artery disease     [facility] 2023 - complete occlusion of RCA     ESRD (end stage renal disease) on dialysis (H)      History of hemochromatosis 10/11/2019     Hypertension      Obesity      PAF (paroxysmal atrial fibrillation) (H)      Psoriatic arthritis (H)       Past Surgical History:   Procedure Laterality Date     APPENDECTOMY      Removed at 18 Years Old      COLONOSCOPY       at [facility]      CV CORONARY ANGIOGRAM N/A 2023    Procedure: Coronary Angiogram;  Surgeon: Pablo Araujo MD;  Location: [facility] CARDIAC CATH LAB     EYE SURGERY Bilateral     Cataract     HERNIA REPAIR      History of bilateral inguinal hernia repair: 10/28/2014. Open hernia repair: 10/2017. Abdominal wound exploration and debridement 2017     INSERT SHUNT PORTAL TRANSJUGULAR INTRAHEPTIC  2022     IR CVC TUNNEL PLACEMENT > 5 YRS OF AGE  2023      No Known Allergies   Social History     Tobacco Use     Smoking status: Never     Passive exposure: Never     Smokeless tobacco: Never   Vaping Use     Vaping status: Never Used   Substance Use Topics     Alcohol use: Not Currently     Comment: Last ETOH use was 2021      Wt Readings from Last 1 Encounters:   23 111.9 kg (246 lb 11.1 oz)        Anesthesia Evaluation   Pt has had prior anesthetic. Type: General.    No history of anesthetic complications       ROS/MED HX  ENT/Pulmonary: Comment: Intubated  Vent Mode: CMV/AC  (Continuous Mandatory Ventilation/ Assist Control)  FiO2 (%): 40 %  Resp Rate (Set): 16 breaths/min  Tidal Volume (Set, mL): 500 mL  PEEP (cm H2O): 5  cmH2O  Resp: 18     (-) tobacco use, asthma and COPD   Neurologic: Comment: - hx of hepatic encephalopathy, w/ recent admission   Sedated on propofol infusion      Cardiovascular:     (+) hypertension--CAD ---dysrhythmias (paroxysmal atrial fibrillation on warfarin ), Previous cardiac testing   Echo: Date: 6/5/2023 Results:  Interpretation Summary  Technically difficult study. The patient's rhythm is atrial fibrillation.  Global and regional left ventricular function is normal with an EF of 55-60%.  Global right ventricular function is normal.  No significant valvular abnormalities present.  IVC diameter <2.1 cm collapsing >50% with sniff suggests a normal RA pressure  of 3 mmHg.  No pericardial effusion is present.  No significant changes noted.  Stress Test:  Date: 1/12/2023 Results:     The nuclear stress test is negative for inducible myocardial ischemia or infarction.     Left ventricular function is normal  ECG Reviewed:  Date: 6/5/2023 Results:  Atrial fibrillation   Abnormal QRS-T angle, consider primary T wave abnormality   Abnormal ECG   When compared with ECG of 11-MAY-2023 03:50,   No significant change was found   Cath:  Date: 4/27/2023 Results:  Severe single vessel CAD with  of the RCA.  - Stenosis: Prox LAD 30%, 1st and 2nd diagonal 50%, midRCA 100%    METS/Exercise Tolerance:     Hematologic:     (+) History of blood clots, pt is anticoagulated,     Musculoskeletal: Comment: - Psoriatic arthritis       GI/Hepatic: Comment: - Cirrhosis 2/2 EtOH, hemochromatosis  - Hx of Variceal bleed s/p TIPS (10/1/2022)  - Hx Hepatic encephalopathy   - Hx recurrent C. Diff  - Gastric antral vascular ectasia, Hx upper GI bleed  - MELD-Na 30 (6/5/2023)      (+) liver disease,     Renal/Genitourinary:     (+) renal disease (ESRD on HD (M,W,F), IgA Nephropathy + ANCA Vasculitis ), type: ESRD, Pt requires dialysis, type: Hemodialysis,     Endo:     (+) Obesity (BMI 35),     Psychiatric/Substance Use:     (+) alcohol  abuse (Sober since 2016)     Infectious Disease:       Malignancy:       Other:  - neg other ROS          Physical Exam    Airway   unable to assess          Respiratory Devices and Support         Dental    unable to assess        Cardiovascular          Rhythm and rate: irregular and tachycardia     Pulmonary           (+) rhonchi           OUTSIDE LABS:  CBC:   Lab Results   Component Value Date    WBC 12.3 (H) 06/06/2023    WBC 10.8 06/06/2023    HGB 9.2 (L) 06/06/2023    HGB 9.1 (L) 06/06/2023    HCT 28.1 (L) 06/06/2023    HCT 26.2 (L) 06/06/2023     (L) 06/06/2023    PLT 74 (L) 06/06/2023     BMP:   Lab Results   Component Value Date     06/06/2023     06/06/2023    POTASSIUM 3.7 06/06/2023    POTASSIUM 5.0 06/06/2023    CHLORIDE 107 06/06/2023    CHLORIDE 104 06/06/2023    CO2 20 (L) 06/06/2023    CO2 22 06/06/2023    BUN 32.6 (H) 06/06/2023    BUN 50.9 (H) 06/06/2023    CR 2.76 (H) 06/06/2023    CR 4.50 (H) 06/06/2023     (H) 06/06/2023     (H) 06/06/2023     COAGS:   Lab Results   Component Value Date    PTT 42 (H) 06/06/2023    INR 2.17 (H) 06/06/2023    FIBR 182 06/06/2023     POC: No results found for: BGM, HCG, HCGS  HEPATIC:   Lab Results   Component Value Date    ALBUMIN 2.1 (L) 06/06/2023    PROTTOTAL 3.4 (L) 06/06/2023     (HH) 06/06/2023    AST 1,276 (HH) 06/06/2023    ALKPHOS 99 06/06/2023    BILITOTAL 1.7 (H) 06/06/2023    DEBRA 56 04/07/2023     OTHER:   Lab Results   Component Value Date    PH 7.35 06/06/2023    LACT 2.3 (H) 06/06/2023    A1C 5.1 06/06/2023    NAZARIO 8.6 (L) 06/06/2023    PHOS 4.6 (H) 06/06/2023    MAG 2.3 06/06/2023    LIPASE 149 (H) 01/23/2023    AMYLASE 153 (H) 06/05/2023    TSH 3.37 04/07/2023    T4 1.09 11/22/2022    SED 11 01/15/2023       Anesthesia Plan    ASA Status:  4, emergent    NPO Status:  NPO Appropriate    Anesthesia Type: General.     - Airway: ETT   Induction: RSI, Intravenous.   Maintenance: Inhalation.   Techniques and  Equipment:     - Airway: Shoulder Jamari/Ramp, Video-Laryngoscope     - Lines/Monitors: 2nd IV, Arterial Line, Central Line, CVP, BIS, PAC     - Blood: T&C     - Drips/Meds: Vasopressin, Norepi     Consents    Anesthesia Plan(s) and associated risks, benefits, and realistic alternatives discussed. Questions answered and patient/representative(s) expressed understanding.    - Discussed:     - Discussed with:  Implied consent/emergency         Postoperative Care            Comments:    Other Comments: Emergent return to OR for renal malperfusion                  Jeremy Rankin MD

## 2023-06-07 NOTE — PROGRESS NOTES
Admitted/transferred from: Arrived on 4A from the OR at 2030.  S/P liver and kidney transplants (see 's Brief Op note).     Reason for admission/transfer: Monitor post op in ICU.    Patient status upon admission/transfer: See flowsheets for vital signs, I/O's, and labs.    Interventions:   Continued propofol and insulin gtts.  Started fentanyl gtt and MIVF.  Scheduled post op ultrasound performed.  CRRT restarted.    Unsecured bilateral wrist restraints applied due to not following commands and patient lifted arms toward chest,  notified and placed restraint order.  Frequent reassurance given and frequently reoriented.    Patient went back to OR at 2250 due to ultrasound findings.  Patient's wife, Apoorva, was updated by Transplant Fellow, .    Plan:   2 RN skin assessment: completed by  Karen Honeycutt, LORIN and Karime Quezada RN.    Result of skin assessment and interventions/actions:  See flowsheets for details.    Weight: done    Patient belongings: Per patient's wife, Apoorva, she went up to 7th floor where patient was pre op, and took patient's belongings home.    MDRO education (if applicable): Yes

## 2023-06-07 NOTE — PROGRESS NOTES
Patient removed from UNOS waitlist after  donor whole liver, Left kidney transplant.   OS ID [id number].    Donor Has Risk Criteria for Transmission of HIV/HCV/HBV: No   Recipient Notified of Risk Criteria: No    Adeline Jc RN  Transplant Coordinator

## 2023-06-07 NOTE — BRIEF OP NOTE
[facility]    Brief Operative Note    Pre-operative diagnosis: Bleeding [R58]  Post-operative diagnosis Same as pre-operative diagnosis    Procedure: Procedure(s):  Return liver transplant  Surgeon: Surgeon(s) and Role:     * Chad Clifton MD - Primary     * Subhash Singh MD - Fellow - Assisting  Anesthesia: General   Estimated Blood Loss: 20 mL    Drains: None  Specimens: * No specimens in log *  Findings:   Pink well perfused kidney.  Complications: None.  Implants: * No implants in log *

## 2023-06-07 NOTE — PROGRESS NOTES
CRRT STATUS NOTE    DATA:  Time:  5:27 PM  Pressures WNL:  Yes  Filter Status:  WDL    Problems Reported/Alarms Noted:  None    Supplies Present:  Yes    ASSESSMENT:  Patient Net Fluid Balance:  Net IO Since Admission: 9,291.12 mL [06/07/23 1727]     Intake/Output Summary (Last 24 hours) at 6/7/2023 1727  Last data filed at 6/7/2023 1700  Gross per 24 hour   Intake 6121.29 ml   Output 4203 ml   Net 1918.29 ml      Vitals:  Temp:  [97  F (36.1  C)-99.2  F (37.3  C)] 98.2  F (36.8  C)  Pulse:  [] 130  Resp:  [16-23] 18  BP: ()/(61-99) 102/65  MAP:  [52 mmHg-98 mmHg] 74 mmHg  Arterial Line BP: ()/(39-80) 99/62  FiO2 (%):  [40 %-50 %] 40 %  SpO2:  [95 %-100 %] 96 %   Labs:    Lab Results   Component Value Date     06/07/2023     06/07/2023    POTASSIUM 4.5 06/07/2023    POTASSIUM 4.5 06/07/2023    CR 2.19 (H) 06/07/2023    CR 2.19 (H) 06/07/2023    BUN 26.2 (H) 06/07/2023    BUN 26.2 (H) 06/07/2023    MAG 2.0 06/07/2023    PHOS 4.8 (H) 06/07/2023    WBC 16.4 (H) 06/07/2023    HGB 8.9 (L) 06/07/2023    HCT 27.4 (L) 06/07/2023    PLT 76 (L) 06/07/2023     Goals of Therapy: I=O     INTERVENTIONS:   Review flow sheets and discuss plan of care with bedside nurse and nephrology team.    PLAN:  Continue fluid removal as tolerated per goals of therapy.  Check filter daily and change filter q 72 hrs and PRN.  Please contact the CRRT resource RN at [phone] with any questions/concerns.

## 2023-06-07 NOTE — ANESTHESIA POSTPROCEDURE EVALUATION
Patient: Damion Quinones    Procedure: Procedure(s):  Transplant liver recipient  donor  Transplant kidney recipient  donor, ureteral stent placement       Anesthesia Type:  General    Note:  Disposition: ICU   Postop Pain Control: Uneventful            Sign Out: Well controlled pain   PONV: No   Neuro/Psych: Uneventful            Sign Out: PLANNED postop sedation   Airway/Respiratory: Uneventful            Sign Out: AIRWAY IN SITU/Resp. Support               Airway in situ/Resp. Support: ETT                 Reason: Planned Pre-op   CV/Hemodynamics: Uneventful            Sign Out: Acceptable CV status; No obvious hypovolemia; No obvious fluid overload   Other NRE: NONE   DID A NON-ROUTINE EVENT OCCUR? No           Last vitals:  Vitals:    23 1821 23 2213 23 0556   BP: (!) 142/104 130/84 120/84   Pulse: 77 72 84   Resp: 16 16 16   Temp: 36.4  C (97.6  F) 36.5  C (97.7  F) 36.3  C (97.4  F)   SpO2: 100% 100% 100%       Electronically Signed By: Rekha Lyn MD  2023  8:39 PM

## 2023-06-07 NOTE — OP NOTE
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 Liver transplant  2.  Complex reconstruction of the accessory right hepatic artery    3. Lysis of adhesions taking an additional 30 minutes of operating time    For the kidney transplant  see separate operative note.    Surgeon:  Surgeon(s) and Role:     * Chad Clifton MD - Primary     * Mamadou Hunt MD - Resident - Assisting     * Subhash Singh MD - Fellow - Assisting    Fellow/assistant:  Dr. Singh assisted the entire procedure.  There was no qualified resident to assist with this procedure    Anesthesia:  General    Specimen:  Explant liver    Drains:  1 MAE drain    Urine output:  Few mL mls    Estimated blood loss:  700 ml    Fluids administered:    Fluid Amount   Crystalloid (mL) 6,000   RBC (Units) 4   FFP (Units) 1   Platelets (Units) 2   Cell Saver (CCs) 500        Intraoperative Events: None    Complications: None    Findings:   #1.  Cirrhotic liver macronodular cirrhosis.  #2.  Close to 1 L of ascites present  #3.  Severe portal hypertension  #4.  The donor liver had 2 arteries the right artery was arising from the superior mesenteric artery and was connected to the splenic artery of the donor stump.    Brief description of the procedure:    Orthotopic liver transplant, caval replacement, portal vein end-to-end anastomosis, there were 2 hepatic arteries the right and left.  The right is connected to the splenic artery stump, bile duct end-to-end anastomosis no stent placed.    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 A suitable donor offer has become available. After discussing the risks and benefits of proceeding, the patient agreed to proceed with surgery  and provided informed consent.    Final ABO/Crossmatch verification: After the donor organ arrived to the operating room and prior to anastomosis, I participated in the transplant pre-verification upon organ receipt timeout by visually verifying the donor ID, organ and laterality, donor blood type, recipient unique identifier, recipient blood type, and that the donor and recipient are blood type compatible.     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    Fellow/Assistant:  Dr. Yury Ratliff assisted the entire procedure.  There was no qualified resident to assist with this procedure    Anesthesia:  None    Graft biopsy:  Yes-showed less than 5% microsteatosis 20% microsteatosis no fibrosis or necrosis.    Macroscopic steatosis:  None    Back table reconstruction:  No Reconstruction    Intimal flap repair:  No    # of hepatic arteries:  To    # of portal veins:  1    Accessory arteries:  Yes    # of hepatic veins:  3    # of bile ducts:  1    Graft weight:  Medium to large liver     Findings:   Liver Laceration: No  Overall quality of liver: Good    Back Table Procedure: The liver allograft was received and inspected and the aforementioned findings were noted. It had been previously flushed with UW. The donor liver was  placed in fresh ice-cold preservation solution. We identified the inferior vena cava. Two stay sutures were placed on the supra-hepatic portion of the cava. Two stay sutures were placed on the infra-hepatic portion of the cava. The fibro-fatty tissue and adrenal gland was cleared of inferior vena cava. The phrenic vein was ligated. The adrenal vein was ligated. The IVC was tested for leaks by using a bulb syringe. We suture ligated all identified leaks. The portal vein was identified. All the fibro-fatty area or tissue around the portal vein was removed and the portal vein was dissected up to its bifurcation. An 8-Turkish cannula was placed in the portal vein and fixed with a stitch. The portal vein was tested for leaks. We suture ligated all identified leaks. The cannula was left in place to be used for flushing the liver at the time of implantation. The hepatic artery anatomy was identified.  There are 2 separate arteries the right hepatic artery was arising from the superior mesenteric artery and the left hepatic artery was from the celiac axis.  The celiac axis  was traced all the way from the aortic patch to the level of the gastro-duodenal artery. Dissection was stopped at the level of the gastro-duodenal artery. All the leaks in the hepatic artery tributaries were suture ligated. The bile duct was inspected and flushed.The liver was placed back in ice-cold preservation solution until ready for transplantation. Faculty was present for the critical portions of the procedure.    Findings:    Operative Procedure:   Arterial anastomosis start:  6/6/2023  1:40 PM    Recipient arterial unclamp:  6/6/2023  3:00 PM    Extra vessels used:  No    Extra vessels banked:  Yes    Previous upper abd surgery:  No    Previous cholecystectomy?  No    Portal vein:  Thrombus: No      On portal bypass:  Yes-flows were greater than a liter    Arterial flow:  Sufficient: Yes-there were 2 hepatic arteries.  The right hepatic artery  was anastomosed to the splenic stump of the donor.    Bile duct anastomosis:    To duct: Yes-the donor bile duct was about 6 mm.  The recipient bile duct was about 8 mm we performed an end-to-end anastomosis with interrupted 6-0 Prolene no stent was placed        Damion Quinones was brought to the operating room, placed in a supine position, and a time out was performed. Sequential compression devices were placed on both lower extremities and general endotracheal anesthesia was induced. The patient was given IV antibiotics, and Thymoglobulin. A Lux catheter was placed. A central line was placed by Anesthesia service. The abdomen was then shaved, prepped, and draped in the usual sterile fashion.  The backtable preparation occurred prior to implantation.    We entered the abdominal cavity using Vertical Extension (Lisa) incision. The abdomen was examined. Lysis of adhesions took  an additional 30  minutes of operating time. We proceeded to mobilization of the liver first by dividing falciform ligament, next dividing the triangular ligaments and mobilizing the left lobe with further evaluation of the right lobe of the liver. Next the right lobe of the liver was mobilized. We elected to proceed with a hilar dissection. The right and left hepatic arteries were identified, ligated and divided, followed by ligation and division of the common bile duct. The portal vein was cleared from tissue and small branches were tied off and divided. The left and right portal veins were separately ligated and the portal vein was divided. At this point we went on the bypass with bypass flow of 1 liter per minute. Next, we continued mobilizing the right lobe. The adhesions between the right lobe and the right diaphragm were divided and the lobe was mobilized up to the vena cava. The hepatic veins were identified. Next, we dissected out the caudate lobe and infrahepatic IVC.     We applied Infrahepatic and suprahepatic clamps to the  IVC and the liver was excised with curved Pedroza scissors. The recipient's liver was passed off from the operating table. Next, complete hemostasis was obtained. The donor liver was brought into the field. The suprahepatic IVC anastomosis was constructed with 3-0 Prolene followed by the construction of infrahepatic anastomosis with 4-0 Prolene. Next, we came off the bypass and end-to-end portal anastomosis was constructed between the donor and recipient portal veins using 6-0 Prolene. During the construction of the infrahepatic IVC anastomosis, the liver was flushed with 5% albumin. Once the portal anastomosis was constructed, the liver was reperfused. Complete hemostasis was obtained and arterial anastomosis was performed between the donor celiac trunk patch using Parker technique and the bifurcation of the right and left recipient hepatic arteries.  The recipient hepatic artery was on the SMA stump.  It was anastomosed to the splenic artery stump of the donor using continuous 7-0 Prolene.  After the anastomosis there was an excellent pulse in both the vessels.  After clamps were released, there was a good flow within the donor artery. Again, all the arterial branches that were bleeding were ligated and complete hemostasis was obtained. After the anastomosis was performed, good flow was re-established, hemostasis was obtained. Next, the biliary anastomosis was performed using a 6-0 Prolene interrupted sutures no stent was placed.    We then proceeded to perform the kidney transplant.  After the kidney transplant was done which came back and closed the liver transplant incision.    Next again the abdomen was irrigated and hemostasis was obtained. We closed the abdomen with #1 PDS in 2 layers.. All needle, sponge and instrument counts were correct x 2. Faculty was present for key portions of the procedure. The patient was awakened, extubated, and transferred to the ICU for post-op monitoring.

## 2023-06-07 NOTE — OP NOTE
Transplant Surgery  Operative Note    Preop Dx:  S/p SLK with concern for flow in kidney graft  Postop Dx: same  Procedure: Exploratory laparotomy, washout, closure, and intra-operative Ultrasound of the kidney graft  Surgeon: Chad Clifton M.D.  ASSISTANT:  Subhash Singh fellow.  There was no qualified general surgery resident available to assist during this procedure.   Anesthesia: General  EBL: 20 ml  Fluids: 800 mL  UO: 45 mL  Drains: no drain  Specimen: none.  Complications: None  Findings:  Healthy appearing kidney graft with intact arterial and venous flow  Complications: None.    Indication: The patient is post SLK with US findings concerning for diminished flow in kidney graft.  After discussing the risks and benefits of surgery and potential complications, the patient's spouse provided informed consent.     DETAILS OF PROCEDURE:  The patient was brought to the operating room, placed in a supine position.  Perioperative prophylactic IV antibiotics were given.  Anesthesia was adminisitered. The abdomen was prepped and draped in the usual sterile fashion.  Time out was performed.    The prior RLQ incision was reopened and the field irrigated. The kidney was elevated out of the wound and appeared pink with intact arterial flow. A Bookwalter retractor was placed for better viewing of the anastomoses. The three arteries had intact pulse by both doppler and flow probe. Papaverine and verapimil were applied. The vein was also examined and found to be soft without evidence of thrombosis. The field was thoroughly irrigated. The fascia was then closed with 1 PDS running. The subcutaneous tissue was closed with 3-0 vicryl. Staples were then placed to approximate the skin before placement of Provena incisional vac. Prior to Provena placement, an intra-operative US was performed demonstrating intact flow.    All needle, sponge, and instrument counts were accurate.  The patient tolerated the procedure well without  apparent complications and was trasfered to the ICU in good condition.  Faculty was present for critical portions of the procedure..  Physician Attestation   I was present for the key portions of the procedure and I was immediately available for the entire procedure between opening and closing.    Chad Clifton MD  Date of Service (when I saw the patient): 6/6/2023  The transplant fellow, Dr. Singh, was present and assisted with all aspects of the operation described above from opening to closing.  There was no suitable surgical resident available to assist in this procedure.

## 2023-06-07 NOTE — CONSULTS
Transplant Admission Psychosocial Assessment    Patient Name: Damion Quinones  : 1955  Age: 67 year old  MRN: [id number]  Date of Initial Social Work Evaluation: 22    Patient underwent a  donor liver and kidney transplant on 23.  Met with Casey's wife Apoorva to update psychosocial assessment and provide education about SW role while inpatient, and to begin discussion of expectations/requirements, caregiver needs and follow up needs post-transplant.     Presenting Information   Living Situation: Casey and his wife Apoorva live together in a house in [place], [place].  They have lived at this residence for the past thirty years, and they deny any concerns.   If not local, plans for short term stay: n/a  Previous Functional Status: Casey receives assistance with his medications from his wife.  He is using a walker as needed.   Cultural/Language/Spiritual Considerations: Oriental orthodox, English    Support System  Primary Support Person: wife Apoorva  Other support:   José Miguel lives in [facility] and Denice lives in [place], [place].  Plan for support in immediate post-transplant period: Apoorva is involved and supportive, and she previously identified herself as patient's primary care giver.    Health Care Directive  Decision Maker: patient  Alternate Decision Maker: wife Apoorva is patient's next of kin  Health Care Directive: Provided education during patient's transplant evaluation    Mental Health/Coping:   History of Mental Health: no history  History of Chemical Health: Casey's last use of alcohol was [place] 2020, one glass of champagne.  He was previously sober since 2016. Casey reports he was drinking twelve beers per day prior to discontinuing all consumption in 2016.  This was his pattern of drinking for a couple of years.  Prior to this reports consuming an average of eighteen beers per week.  Current status: stable  Coping: unable to assess  Services Needed/Recommended: virtual liver transplant  support group    Financial   Income: Casey receives Social Security correction benefits.  His wife works full-time for [employer].  Impact of transplant on income: cost of travel to/from transplant center  Insurance and medication coverage: Medicare and a BCBS supplement.  Financial concerns: none voiced  Resources needed: ongoing assessment    Education provided by SW: Social Work role inpatient setting, availability of support group, parking information     Assessment and recommendations and plan:  Transplant social work will follow patient's progress with PT/OT to help determine a safe discharge plan.    2587 form to be completed prior to discharge.      MOUNIKA Sawant, [facility]  Liver Transplant   Phone [phone]  Pager [phone]

## 2023-06-07 NOTE — PROGRESS NOTES
Brief SICU Note    Called to bedside by ultrasound tech.  Kidney parenchyma visualized, no vascular flow.    Flow visualized through iliac artery and vein.  Transplant fellow notified and plans for return to OR.    Mya Hennessy MD  General Surgery Resident

## 2023-06-07 NOTE — OP NOTE
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 A J-J ureteral stent was placed.  The kidney had 3 renal arteries.  Kidney was placed intra-abdominal.  2. Kidney allograft preparation on Back Table  3.  Placement of Prevena wound VAC as patient had obesity BMI greater than 35.  4.  The details of liver transplant on separate note    Surgeon:  ROOSEVELT HILL    Fellow/Assistant:  Dr. Subhash ellison , assisted the entire procedure.  There was no qualified resident to assist with this procedure.    Anesthesia:  General    Specimen:  None    Drains:  no drain    Urine output:  Few mls    Estimated blood loss:  700    Fluids administered:       Intraoperative Events: The patient was pretty deep.  There was a mesh at the lower right quadrant so we had to go intraperitoneal.  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 After discussing the risks and benefits of proceeding, the patient agreed to proceed with surgery and provided informed consent.    Final ABO/Crossmatch verification: Prior to incision, I verified the donor ABO and recipient ABO. After the donor organ arrived to the operating room and prior to anastomosis, I visually verified that the donor identification, blood type, and other vital data were compatible with the recipient. The crossmatch was done prospectively; the T cell flow crossmatch result was negative and B cell Flow crossmatch result was negative.  The patient received Thymoglobulin on induction.    Donor Organ  Information:   Donor UNOS ID:  [id number]    Donor ABO:  O    Donor arterial clamp on:  6/6/2023 11:46 AM    Total ischemic time:  326    Cold ischemic time:  269    Warm ischemic time:  57    Preservation fluid:  UW      Back Table Details:   Procedure:  Bench preparation of the kidney allograft for transplantation with arterial and venous reconstruction    Preoperative diagnosis:  Chronic renal failure due to IgA nephropathy + ANCA vasculitis    Postoperative diagnosis:  Same,     Surgeon:  ROOSEVELT HILL    Faculty Co-Surgeon:  ROOSEVELT HILL    Fellow/Assistant:  Dr. Yury Ratliff MD    Anesthesia:  None    Donor arrival to recipient room:  6/6/2023  2:03 PM    Graft injury:  No    Graft biopsy:  No    Organ received on:  Ice    Pump resistance:      Pump flow:      Arterial anatomy:  There were 3 arteries.  2 of them be joined together as a patch in 1 week separately anastomosed    Donor arterial quality:  Excellent    Venous anatomy:  Single    Ureteral anatomy:  Single    Any reconstruction:  Yes    Artery:  The 2 arteries were joined together on 1 patch, and the second 1 was a separate anastomosis.    Vein:  Caval conduit     Findings: Normal     Back Table Preparation:  The donor kidney was received and inspected. It had been flushed with UW. The graft was prepared on the back table by removing perinephric fat and ligating venous tributaries and lymphatics. The ureter was also cleaned of excess tissue. If required, reconstruction was performed as detailed: There were 3 arteries.  We joined the operative arteries and a patch and sewed it with 5-0 Prolene.  The renal vein was in a caval conduit we extended the conduit using a stapler.. The kidney was stored in iced cold preservation solution until ready for transplantation. Faculty was present for the critical portions of the procedure.    Operative Procedure:   Arterial anastomosis start:  6/6/2023  4:15 PM    Arterial unclamp:  6/6/2023  5:12 PM     Extra vessels used:  No      The patient was brought to the operating room, placed in a supine position, and a time out was performed. Sequential compression devices were placed on both lower extremities and general endotracheal anesthesia was induced.  The patient was given IV antibiotics and a Lux catheter. A central line was placed by Anesthesia service. The abdomen was then shaved, prepped, and draped in the usual sterile fashion.  An incision was made in the Right and carried down through the subcutaneous tissue and the abdominal wall fascia.  We went intra-abdominal.  If encountered, the epigastric vessels were ligated in continuity, divided and secured with surgical clips. The right iliac artery and vein were exposed. The retractor system was placed and the lymphatics overlying the vessels were serially ligated and divided.     The patient was not heparinized. We applied atraumatic vascular clamps and the donor kidney was brought to the operative field. We made a venotomy and the caval conduit was anastomosed to the recipient right External Iliac vein in an end-to-side fashion. An arteriotomy was made and the donor renal artery (patch with 2 arteries) was anastomosed to the recipient right external iliac artery  in an end to side fashion.  After this we made another arteriotomy superiorly and anastomose the third artery of the donor kidney end-to-side fashion with a continuous 6-0 Prolene.  The patient was simultaneously loaded with IV mannitol, Lasix and volume. The renal artery was protected and the clamps were removed. After several cardiac cycles, we opened the renal artery and the kidney had Good reperfusion and was soft.    The transplant ureter was managed by creating a Liche (anterior multistitch) anastomosis with absorbable suture. A stent was placed across the anastomosis. The kidney made No urine prior to implantation.    Hemostasis was obtained, the anastomoses inspected, and the kidney placed  in the iliac fossa. After placement, the vessel lay was inspected and found to be acceptable. The kidney position was Intra-peritoneal. The field was irrigated with antibiotic solution. No drain was placed. The retractor was removed and the abdominal wall fascia reapproximated. Subcutaneous tissues were irrigated and hemostasis obtained.  The skin was reapproximated with staples and Prevena wound care VAC was applied.   All needle, sponge and instrument counts were correct x 2.  We then proceeded to close the liver transplant incision.  Please see the separate notes on the liver transplant for that.

## 2023-06-07 NOTE — PROGRESS NOTES
CRRT STATUS NOTE    DATA:  Time:  6:30 AM  Pressures WNL:  YES  Filter Status:  WDL    Problems Reported/Alarms Noted:  None.    Supplies Present:  YES    ASSESSMENT:  Patient Net Fluid Balance:  Net +2.3L @ 0600.  Vitals: T 98.8, HR 91, RR 16, /70, MAP 87.  Labs: K 4.4, Mg 2.1,Phos 4.7, iCa 4.3 ,Hgb 8.5, Plt 130   Goals of Therapy: I=O    INTERVENTIONS:   None.    PLAN:  Continue to monitor circuit daily and change set q72 hours or PRN for clotting/clogging. Please call CRRT RN with any quesitons/problems.

## 2023-06-07 NOTE — PROGRESS NOTES
SURGICAL PROGRESS NOTE  2023      PRIMARY TEAM: Transplant Surgery  PRIMARY PHYSICIAN: Dr. Clifton  REASON FOR CRITICAL CARE ADMISSION: Close hemodynamic monitoring, mechanical ventilation  ADMITTING PHYSICIAN: Dr. 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 PMHx also includes coronary artery disease, alcohol overuse, obesity, ESRD on HD  (IgA nephropathy + ANCA vasculitis), psoriatic arthritis, paroxysmal atrial fibrillation (on warfarin), DVT, recurrent c diff, and HTN.  He is now s/p  donor liver and kidney transplant on 23 with Dr. Clifton.    Intraop:   ml  500 ml Cell saver returned  4 uPRBC  2uPlt  1 uFFP  5L crystalloid  UOP 40cc prior to kidney, 90cc post kidney    PLAN:    Updates today (2023)  - Returned to OR overnight due to concerns for compromised flow to kidney, found to be pink and well perfused  - Wean sedation  - SBT, extubate  - Discontinue MIVF, CRRT I=O per transplant nephrology  - Remove swan-morales  - NJ placement for TF per transplant    Neurological:  # Acute post-surgical pain   # Hx Alcohol use disorder, sober since 2016  - Monitor neurological status. Delirium preventions and precautions.   - Pain control: fentanyl gtt, discontinue if extubated. Will add multimodal pain control following successful extubation.   - Sedation plan: propofol gtt, wean with goal of extubation    Pulmonary:   # Post operative ventilatory support  # Acute hypoxic respiratory failure requiring mechanical ventilation, resolved  - VENT: /16/40%/5. Successful PST for 2 hours on , extubated to NC without issue.   - Incentive spirometry  - Supplemental O2 as needed to maintain SpO2 >  92%    Cardiovascular:    #Hx pAfib on warfarin  #Hx CAD  #Hx HTN  #Hypotension  #Shock-distributive vs hypovolemic  - Monitor hemodynamic status. 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 (MELD-Na 30) complicated by variceal bleeding s/p TIPS (10/1/2022) and hepatic encephalopathy now s/p DDLT   -  repeat liver US shows persistent low resistance waveforms and low resistive indices throughout hepatic artery system concerning for ischemia, stable velocities and upstroke  - MAE drain x1, monitor and record output  - q4h Hgb, plt, lactate, ABG, INR, fibrinogen  - q12h CMP, CBC   - NJ placed by RD, appreciate recommendations  - PPI    Fluids/Electrolytes:   #Lactic acidosis, resolved  - Discontinue D51/2NS  - Continue CRRT, I=O per transplant nephrology  - Monitor lactate    Renal:  # Hx ESRD on HD MWF (IgA nephropathy + ANCA vasculitis) now s/p DDKT 23  - transplant nephrology consulted, appreciate expertise  - Continue CRRT, I=O today  - makes some urine at baseline. Will continue to monitor intake and output.  - Lux catheter to remain in place until at least POD5 per transplant, strict I/O monitoring   - Expect some delayed graft function with  donor kidney, will continue to closely monitor    Endocrine:  # Stress hyperglycemia   -Insulin drip for blood glucose management, continue today. Goal to keep BG< 180 for optimal wound healing     ID:  #Stress leukocytosis   #Post-transplant ppx per transplant  - WBC 14.4 from 18.2  -Perioperative antibiotics: Zosyn, micafungin  -Immunosuppression per transplant: thymoglobulin, tacrolimus 3mg BID, methylprednisolone taper  -Prophylactic regimen: Valganciclovir, Bactrim Ppx    Heme:     # Acute blood  loss anemia   # Anemia of critical illness and chronic renal disease  # Hx hereditary hemochromatosis   # Thrombocytopenia 2/2 liver dysfunction  - Transfusion goals: INR <2, Platelets > 20, Fibrinogen > 200, Hgb >8.0  - Hgb 8.9 (8.9, 7.5, 8.5)  - Plt 81 (73, 130)    Rheumatologic:  #Hx Gout  - hold PTA prednisone 10mg daily    Musculoskeletal:  # Weakness and deconditioning of critical illness   - Physical and occupational therapy consult, appreciate recommendations. Encourage OOB.     Skin:  -Diligent skin care to prevent breakdown    General Cares/Prophylaxis:    DVT Prophylaxis: Mechanical, SCDs  GI Prophylaxis: PPI  Restraints: Restraints for medical healing needed: NO    Lines/ tubes/ drains:  -ETT (remove today)  -PIVs  -left radial Arterial line (remove today)  -PTA Left tunneled IJ central line  -Right internal jugular CVC with Limon (remove)  -Right internal jugular MAC  -OG (remove)  -Lux catheter  -Intraabdominal MAE drainx1   -incisional wound vac (kidney incision)    Disposition:  - Surgical ICU.     Discussed with surgical ICU staff, Dr. Brower- Gely Peña MD  General Surgery PGY-2      - - - - - - - - - - - - - - - - - - - - - - - - - - - - - - - - - - - - - - - - - - - - - -   INTERVAL EVENTS/SUBJECTIVE: No acute events since return to OR. Sedated when seen this morning.       PHYSICAL EXAMINATION:  Temp:  [97.4  F (36.3  C)-99.2  F (37.3  C)] 98.8  F (37.1  C)  Pulse:  [] 85  Resp:  [16-23] 16  BP: ()/(62-99) 100/78  MAP:  [52 mmHg-95 mmHg] 74 mmHg  Arterial Line BP: ()/(39-78) 95/63  FiO2 (%):  [40 %-50 %] 40 %  SpO2:  [99 %-100 %] 100 %     General: Intubated, sedated, NAD  HEENT:PERRL, ETT and OG in place  Neck: right MAC x2, swan  Pulm/Resp: Mechanically ventilated, equal chest rise  CV: irregularly irregular rhythm  Abdomen: soft, incision covered with clean bandage, MAE x 1 with s/s output, soft reducible umbilical hernia, RLQ with pravena wound vac  :  stauffer catheter in place with hematuria  MSK/Extremities: peripheral edema, peripheral pulses intact, extremities well perfused, SCDs in place  Neuro: Sedated, PERRL    LABS: Reviewed.   Arterial Blood Gases   Recent Labs   Lab 06/07/23  0341 06/07/23  0028 06/06/23  2315 06/06/23  2109   PH 7.39 7.33* 7.35 7.36   PCO2 33* 40 39 38   PO2 171* 85 198* 211*   HCO3 20* 21 21 21     Complete Blood Count   Recent Labs   Lab 06/07/23  0330 06/07/23  0028 06/06/23  2315 06/06/23 2108 06/06/23  1633 06/06/23  1528 06/06/23  1208 06/06/23  0547   WBC 18.2*  --   --  12.3*  --  10.8  --  12.1*   HGB 8.5* 8.6* 9.2* 9.1*   < > 8.4*   < > 11.6*   *  --   --  114*  --  74*  --  162    < > = values in this interval not displayed.     Basic Metabolic Panel  Recent Labs   Lab 06/07/23  0619 06/07/23  0449 06/07/23  0337 06/07/23  0330 06/07/23  0131 06/07/23  0028 06/06/23  2315 06/06/23  2213 06/06/23  2108 06/06/23  1208 06/06/23  0547 06/05/23  1605   NA  --   --   --  138  --  137 136  --  142   < > 138 139   POTASSIUM  --   --   --  4.4  --  3.7 3.7  --  5.0   < > 4.2 4.1   CHLORIDE  --   --   --  105  --   --   --   --  107  --  104 105   CO2  --   --   --  17*  --   --   --   --  20*  --  22 21*   BUN  --   --   --  32.0*  --   --   --   --  32.6*  --  50.9* 36.9*   CR  --   --   --  2.76*  --   --   --   --  2.76*  --  4.50* 3.85*   * 166* 170* 167*   < > 165* 177*   < > 195*   < > 82 101*    < > = values in this interval not displayed.     Liver Function Tests  Recent Labs   Lab 06/07/23  0330 06/06/23  2108 06/06/23  1627 06/06/23  1528 06/06/23  0547 06/05/23  1605   AST 1,834* 1,276*  --   --  157*  --    * 624*  --   --  76* 84*   ALKPHOS 81 99  --   --  203* 207*   BILITOTAL 1.6* 1.7*  --   --  0.6 0.6   ALBUMIN 2.0* 2.1*  --   --  2.7* 2.9*   INR 2.02* 2.17* 4.30* 5.17* 3.06* 3.54*     Pancreatic Enzymes  Recent Labs   Lab 06/07/23  0330 06/05/23  1605   LIPASE 59  --    AMYLASE 90 153*      Coagulation Profile  Recent Labs   Lab 06/07/23  0330 06/06/23  2108 06/06/23  1627 06/06/23  1528 06/06/23  0547 06/05/23  1605   INR 2.02* 2.17* 4.30* 5.17*   < > 3.54*   PTT 40* 42*  --  64*  --  41*    < > = values in this interval not displayed.       IMAGING:  Recent Results (from the past 24 hour(s))   XR Abdomen Port 1 View    Narrative    TEMPORARY 6/6/2023 7:48 PM    CLINICAL HISTORY: Intraoperative radiograph per Dr. Clifton.  Per  surgeon, no missing foreign object. There is normal count.     COMPARISON: CT 1/23/2023    FINDINGS: Postsurgical changes of the abdomen status post liver  transplant with numerous surgical staples overlying the abdomen.  Surgical drain projecting over the right upper quadrant. Additional  postsurgical changes in the right lower quadrant status post renal  transplant with surgical staples and clips. Ureteral stent and  surgical drain in the right lower quadrant. No obstructive bowel gas  pattern. No pneumatosis or portal venous gas. Embolization material  seen within the left upper abdomen. Degenerative changes of the  lumbosacral spine.       Impression    IMPRESSION:     Postsurgical changes of liver  and renal transplant, as described. No  radiopaque surgical instrument identified on single frontal projection  radiograph reviewed.    Finding was identified on 6/6/2023 7:48 PM.     Heidi PADGETT in OR 8 was contacted by Dr. Young at 6/6/2023 7:59 PM  and verbalized understanding of the urgent finding.     I have personally reviewed the examination and initial interpretation  and I agree with the findings.    KURTIS JENSEN MD         SYSTEM ID:  [id number]   XR Chest Port 1 View   Result Value    Radiologist flags Widened mediastinum, central line positioning (Urgent)    Narrative    EXAM: XR CHEST PORT 1 VIEW  6/6/2023 9:19 PM      HISTORY: central line    COMPARISON: Radiograph 6/5/2023    FINDINGS: Single view of the chest. Endotracheal tube tip projects  over the  midthoracic trachea. Enteric tube course outside the field of  view. Right internal jugular approach [place]-Marc catheter tip  projecting over the right pulmonary artery. Right IJ central venous  catheter with tip projecting over the distal SVC. Additional left IJ  approach tunnel line in stable position with tip projecting over the  right atrium.    Trachea is midline. Enlarged cardiomediastinal silhouette.  Atherosclerotic calcifications of the aortic arch. The mediastinum  appears widened when compared to prior radiograph. Streaky perihilar  and bibasilar pulmonary opacities. Layering right-sided pleural  effusion. No appreciable pneumothorax.      Impression    IMPRESSION:   1. The right internal jugular central line/sheath appears to be  positioned in the distal internal jugular vein. Increased mediastinal  widening when compared to 6/5/2023 radiograph. Findings may be  projectional secondary to AP versus PA positioning, however, if  concerned for possible traumatic line placement could consider chest  CT for further assessment.  2. Layering right-sided pleural effusion.  3. Perihilar and bibasilar opacities, likely atelectasis/edema.    [Urgent Result: Widened mediastinum, central line positioning]    Finding was identified on 6/6/2023 9:21 PM.     Dr. Best was contacted by Dr. Puente at 6/6/2023 9:34 PM and  verbalized understanding of the urgent finding.     I have personally reviewed the examination and initial interpretation  and I agree with the findings.    KURTIS JENSEN MD         SYSTEM ID:  [id number]   XR Abdomen Port 1 View    Narrative    EXAM: XR ABDOMEN PORT 1 VIEW  6/6/2023 9:21 PM     HISTORY:  OG tube       TECHNIQUE: Single frontal radiograph of the abdomen    COMPARISON:  6/6/2023    FINDINGS:     Gastric tube side hole and distal tip are seen within the stomach.  Postsurgical changes of liver transplant. Right upper quadrant drain.  Embolization material overlying the left upper quadrant.  Partially  visualized [place]-Marc catheter. No obstructive bowel gas pattern. No  pneumatosis or portal venous gas.      Impression    IMPRESSION: Gastric tube sidehole and distal tip projects over the  stomach.     I have personally reviewed the examination and initial interpretation  and I agree with the findings.    KURTIS JENSEN MD         SYSTEM ID:  [id number]   US Renal Transplant with Doppler    Narrative    EXAMINATION: US RENAL TRANSPLANT,  6/6/2023 10:11 PM     COMPARISON: None.    HISTORY: 1 vein, 3 arteries    TECHNIQUE:  Grey-scale, color Doppler and spectral flow analysis.     FINDINGS:  The transplant kidney is located in the right lower quadrant, and  measures 10.0 cm length. On color Doppler evaluation there is also  near minimal flow throughout the superior, mid, and inferior pole. No  focal lesions. No hydronephrosis. No perinephric fluid collection.    Renal artery flow:   37 cm/sec peak systolic at hilum with a resistive index of 1.00. There  are tardus parvus waveforms.  Not visualized at the anastomosis.     Lower Pole Arcuate artery:   Not visualized.     Mid pole Arcuate artery:  Not visualized.     Upper Pole Arcuate artery:  Not visualized.     Renal Vein Flow:  70 cm/sec at hilum.   43 cm/sec at anastomosis.    Iliac artery flow:  Not visualized above anastomosis.  148 cm/sec peak systolic below anastomosis.    Iliac vein flow:  Iliac vein is patent above the anastomosis.      Impression    IMPRESSION:  New right lower quadrant kidney transplant with very minimal flow by  color Doppler within the hepatic artery.  Minimal flow in the  transplant venous system. These findings are concerning for arterial  occlusion or severe stenosis.     Finding was identified on 6/6/2023 10:11 PM.     Dr. Francisco BELLO was contacted by Dr. Young at 6/6/2023 10:26 PM and  verbalized understanding of the urgent finding.     I have personally reviewed the examination and initial interpretation  and I agree  with the findings.    HEBERT MAYERS MD         SYSTEM ID:  [id number]   US Liver Transplant    Narrative    EXAMINATION: US LIVER TRANSPLANT, 6/6/2023 10:34 PM     COMPARISON: None.    HISTORY: Caval replacement    TECHNIQUE:  Gray-scale, color Doppler and spectral flow analysis.    FINDINGS:   There is no ascites.    Liver:   The liver demonstrates normal homogeneous echotexture. No  evidence of a focal hepatic mass.     Bile Ducts: Both the intra- and extrahepatic biliary system are of  normal caliber.  The common bile duct measures 4 mm in diameter.    Gallbladder: The gallbladder is surgically absent.    Kidneys:   Right kidney:  The right kidney demonstrates normal echotexture with  no evidence of a shadowing stone, focal mass or hydronephrosis.   8.6  cm in long axis dimension.  Left kidney: Because of overlying bowel gas was not visualized.    Pancreas: Pancreas is not visualized.    Spleen:  The spleen is normal in size, measuring 11.1 cm.    Visualized portions of the aorta are unremarkable.    LIVER DOPPLER:  Splenic vein:  Patent continuous normal antegrade direction flow  towards the liver, 24 cm/sec.  Extrahepatic portal vein:  Patent continuous antegrade flow, 51  cm/sec.  Portal vein at anastomosis: Patent continuous antegrade flow, 65  cm/sec.  Intrahepatic portal vein:  Patent continuous antegrade flow, 69  cm/sec.  Right portal vein flow is antegrade, measuring 36 cm/sec.  Left portal vein flow is antegrade, measuring 71 cm/sec.    Inferior vena cava: patent with flow toward the heart throughout..  IVC above anastomosis:  59 cm/sec.  IVC at anastomosis:  87 cm/sec.  Intrahepatic IVC:  61 cm/sec.  Extrahepatic IVC:  110 cm/sec.    Right, mid, left hepatic veins: Patent with flow towards the inferior  vena cava.    Extrahepatic hepatic artery: Low resistance waveform with flow towards  the liver. 54 cm/sec with resistive index 0.40.  Right hepatic artery: 54 cm/sec with resistive index 0.35.  Left  hepatic artery: 61 cm/sec with resistive index 0.45.      Impression    Impression:   1.  Unremarkable grayscale appearance of transplanted liver.  2.  Low resistant waveforms throughout the hepatic raises concern for  more central occlusive disease of the transplant artery. Velocities  and upstroke however are more normal. No focal area of increased  velocity identified. Short-term follow-up recommended.  3.  The left kidney and pancreas are not visualized on this  examination due to overlying bowel gas.      I have personally reviewed the examination and initial interpretation  and I agree with the findings.    HEBERT MAYERS MD         SYSTEM ID:  [id number]    Renal Transplant with Doppler    Narrative    EXAMINATION: US RENAL TRANSPLANT,  6/7/2023 1:19 AM     COMPARISON: 6/6/2023    HISTORY: kidney tx    TECHNIQUE:  Grey-scale, color Doppler and spectral flow analysis.    FINDINGS:  The transplant kidney is located in the right lower quadrant. Limited  grayscale evaluation is unremarkable    There are 3 renal arteries, with 2 anastomoses.    Renal artery flow:   99, 43, 56 cm/sec peak systolic at hilum.  136, 125 peak systolic at anastomosis.  Arcuate artery resistive indices (upper to lower): 0.71, 0.44, 0.56    Renal Vein Flow:  28 cm/sat hilum.   40 cm/s at anastomosis.      Impression    IMPRESSION: Patent Doppler evaluation of the transplant renal  arteries, vein, and arcuate arteries. Low resistive index in the mid  arcuate artery. Attention on follow-up recommended.    I have personally reviewed the examination and initial interpretation  and I agree with the findings.    HEBERT MAYERS MD         SYSTEM ID:  [id number]

## 2023-06-08 ENCOUNTER — APPOINTMENT (OUTPATIENT)
Dept: PHYSICAL THERAPY | Facility: CLINIC | Age: 68
End: 2023-06-08
Attending: SURGERY
Payer: COMMERCIAL

## 2023-06-08 ENCOUNTER — APPOINTMENT (OUTPATIENT)
Dept: GENERAL RADIOLOGY | Facility: CLINIC | Age: 68
End: 2023-06-08
Attending: TRANSPLANT SURGERY
Payer: COMMERCIAL

## 2023-06-08 ENCOUNTER — APPOINTMENT (OUTPATIENT)
Dept: OCCUPATIONAL THERAPY | Facility: CLINIC | Age: 68
End: 2023-06-08
Attending: SURGERY
Payer: COMMERCIAL

## 2023-06-08 LAB
ALBUMIN SERPL BCG-MCNC: 2.2 G/DL (ref 3.5–5.2)
ALBUMIN SERPL BCG-MCNC: 2.3 G/DL (ref 3.5–5.2)
ALBUMIN SERPL BCG-MCNC: 2.3 G/DL (ref 3.5–5.2)
ALP SERPL-CCNC: 107 U/L (ref 40–129)
ALT SERPL W P-5'-P-CCNC: 692 U/L (ref 10–50)
ANION GAP SERPL CALCULATED.3IONS-SCNC: 11 MMOL/L (ref 7–15)
ANION GAP SERPL CALCULATED.3IONS-SCNC: 11 MMOL/L (ref 7–15)
ANION GAP SERPL CALCULATED.3IONS-SCNC: 15 MMOL/L (ref 7–15)
APTT PPP: 36 SECONDS (ref 22–38)
APTT PPP: 38 SECONDS (ref 22–38)
APTT PPP: 40 SECONDS (ref 22–38)
AST SERPL W P-5'-P-CCNC: 816 U/L (ref 10–50)
BACTERIA SPEC CULT: ABNORMAL
BASOPHILS # BLD AUTO: 0 10E3/UL (ref 0–0.2)
BASOPHILS NFR BLD AUTO: 0 %
BILIRUB DIRECT SERPL-MCNC: 1.19 MG/DL (ref 0–0.3)
BILIRUB SERPL-MCNC: 1.7 MG/DL
BUN SERPL-MCNC: 28 MG/DL (ref 8–23)
BUN SERPL-MCNC: 28.2 MG/DL (ref 8–23)
BUN SERPL-MCNC: 30 MG/DL (ref 8–23)
CA-I BLD-MCNC: 4.3 MG/DL (ref 4.4–5.2)
CA-I BLD-MCNC: 4.3 MG/DL (ref 4.4–5.2)
CA-I BLD-MCNC: 4.5 MG/DL (ref 4.4–5.2)
CALCIUM SERPL-MCNC: 7 MG/DL (ref 8.8–10.2)
CALCIUM SERPL-MCNC: 7.5 MG/DL (ref 8.8–10.2)
CALCIUM SERPL-MCNC: 7.7 MG/DL (ref 8.8–10.2)
CHLORIDE SERPL-SCNC: 106 MMOL/L (ref 98–107)
CHLORIDE SERPL-SCNC: 107 MMOL/L (ref 98–107)
CHLORIDE SERPL-SCNC: 109 MMOL/L (ref 98–107)
CREAT SERPL-MCNC: 1.44 MG/DL (ref 0.67–1.17)
CREAT SERPL-MCNC: 1.62 MG/DL (ref 0.67–1.17)
CREAT SERPL-MCNC: 1.84 MG/DL (ref 0.67–1.17)
DEPRECATED HCO3 PLAS-SCNC: 17 MMOL/L (ref 22–29)
DEPRECATED HCO3 PLAS-SCNC: 19 MMOL/L (ref 22–29)
DEPRECATED HCO3 PLAS-SCNC: 21 MMOL/L (ref 22–29)
EOSINOPHIL # BLD AUTO: 0 10E3/UL (ref 0–0.7)
EOSINOPHIL NFR BLD AUTO: 0 %
ERYTHROCYTE [DISTWIDTH] IN BLOOD BY AUTOMATED COUNT: 17.6 % (ref 10–15)
ERYTHROCYTE [DISTWIDTH] IN BLOOD BY AUTOMATED COUNT: 17.8 % (ref 10–15)
ERYTHROCYTE [DISTWIDTH] IN BLOOD BY AUTOMATED COUNT: 17.8 % (ref 10–15)
FIBRINOGEN PPP-MCNC: 345 MG/DL (ref 170–490)
FIBRINOGEN PPP-MCNC: 345 MG/DL (ref 170–490)
FIBRINOGEN PPP-MCNC: 388 MG/DL (ref 170–490)
GFR SERPL CREATININE-BSD FRML MDRD: 40 ML/MIN/1.73M2
GFR SERPL CREATININE-BSD FRML MDRD: 47 ML/MIN/1.73M2
GFR SERPL CREATININE-BSD FRML MDRD: 54 ML/MIN/1.73M2
GLUCOSE BLDC GLUCOMTR-MCNC: 106 MG/DL (ref 70–99)
GLUCOSE BLDC GLUCOMTR-MCNC: 109 MG/DL (ref 70–99)
GLUCOSE BLDC GLUCOMTR-MCNC: 110 MG/DL (ref 70–99)
GLUCOSE BLDC GLUCOMTR-MCNC: 111 MG/DL (ref 70–99)
GLUCOSE BLDC GLUCOMTR-MCNC: 122 MG/DL (ref 70–99)
GLUCOSE BLDC GLUCOMTR-MCNC: 125 MG/DL (ref 70–99)
GLUCOSE BLDC GLUCOMTR-MCNC: 131 MG/DL (ref 70–99)
GLUCOSE BLDC GLUCOMTR-MCNC: 133 MG/DL (ref 70–99)
GLUCOSE BLDC GLUCOMTR-MCNC: 147 MG/DL (ref 70–99)
GLUCOSE BLDC GLUCOMTR-MCNC: 151 MG/DL (ref 70–99)
GLUCOSE BLDC GLUCOMTR-MCNC: 87 MG/DL (ref 70–99)
GLUCOSE BLDC GLUCOMTR-MCNC: 91 MG/DL (ref 70–99)
GLUCOSE BLDC GLUCOMTR-MCNC: 97 MG/DL (ref 70–99)
GLUCOSE SERPL-MCNC: 113 MG/DL (ref 70–99)
GLUCOSE SERPL-MCNC: 125 MG/DL (ref 70–99)
GLUCOSE SERPL-MCNC: 158 MG/DL (ref 70–99)
HBV SURFACE AG SERPL QL IA: NONREACTIVE
HCT VFR BLD AUTO: 25.4 % (ref 40–53)
HCT VFR BLD AUTO: 26.5 % (ref 40–53)
HCT VFR BLD AUTO: 26.5 % (ref 40–53)
HGB BLD-MCNC: 8.1 G/DL (ref 13.3–17.7)
HGB BLD-MCNC: 8.5 G/DL (ref 13.3–17.7)
HGB BLD-MCNC: 8.5 G/DL (ref 13.3–17.7)
IMM GRANULOCYTES # BLD: 0.1 10E3/UL
IMM GRANULOCYTES NFR BLD: 1 %
INR PPP: 1.96 (ref 0.85–1.15)
INR PPP: 1.98 (ref 0.85–1.15)
INR PPP: 2.04 (ref 0.85–1.15)
LACTATE SERPL-SCNC: 1.5 MMOL/L (ref 0.7–2)
LACTATE SERPL-SCNC: 2.1 MMOL/L (ref 0.7–2)
LYMPHOCYTES # BLD AUTO: 0 10E3/UL (ref 0.8–5.3)
LYMPHOCYTES NFR BLD AUTO: 0 %
MAGNESIUM SERPL-MCNC: 2.4 MG/DL (ref 1.7–2.3)
MAGNESIUM SERPL-MCNC: 2.6 MG/DL (ref 1.7–2.3)
MAGNESIUM SERPL-MCNC: 2.8 MG/DL (ref 1.7–2.3)
MCH RBC QN AUTO: 29.8 PG (ref 26.5–33)
MCH RBC QN AUTO: 30.2 PG (ref 26.5–33)
MCH RBC QN AUTO: 30.5 PG (ref 26.5–33)
MCHC RBC AUTO-ENTMCNC: 31.9 G/DL (ref 31.5–36.5)
MCHC RBC AUTO-ENTMCNC: 32.1 G/DL (ref 31.5–36.5)
MCHC RBC AUTO-ENTMCNC: 32.1 G/DL (ref 31.5–36.5)
MCV RBC AUTO: 93 FL (ref 78–100)
MCV RBC AUTO: 95 FL (ref 78–100)
MCV RBC AUTO: 95 FL (ref 78–100)
MONOCYTES # BLD AUTO: 0.2 10E3/UL (ref 0–1.3)
MONOCYTES NFR BLD AUTO: 2 %
MRSA DNA SPEC QL NAA+PROBE: NEGATIVE
NEUTROPHILS # BLD AUTO: 13.2 10E3/UL (ref 1.6–8.3)
NEUTROPHILS NFR BLD AUTO: 97 %
NRBC # BLD AUTO: 0.1 10E3/UL
NRBC BLD AUTO-RTO: 0 /100
PATH REPORT.COMMENTS IMP SPEC: ABNORMAL
PATH REPORT.COMMENTS IMP SPEC: YES
PATH REPORT.FINAL DX SPEC: ABNORMAL
PATH REPORT.GROSS SPEC: ABNORMAL
PATH REPORT.MICROSCOPIC SPEC OTHER STN: ABNORMAL
PATH REPORT.RELEVANT HX SPEC: ABNORMAL
PATHOLOGY SYNOPTIC REPORT: ABNORMAL
PHOSPHATE SERPL-MCNC: 4.9 MG/DL (ref 2.5–4.5)
PHOSPHATE SERPL-MCNC: 5.5 MG/DL (ref 2.5–4.5)
PHOSPHATE SERPL-MCNC: 5.6 MG/DL (ref 2.5–4.5)
PHOTO IMAGE: ABNORMAL
PLATELET # BLD AUTO: 55 10E3/UL (ref 150–450)
PLATELET # BLD AUTO: 60 10E3/UL (ref 150–450)
PLATELET # BLD AUTO: 61 10E3/UL (ref 150–450)
PLATELET # BLD AUTO: 68 10E3/UL (ref 150–450)
POTASSIUM SERPL-SCNC: 4.6 MMOL/L (ref 3.4–5.3)
PROT SERPL-MCNC: 4.2 G/DL (ref 6.4–8.3)
RBC # BLD AUTO: 2.68 10E6/UL (ref 4.4–5.9)
RBC # BLD AUTO: 2.79 10E6/UL (ref 4.4–5.9)
RBC # BLD AUTO: 2.85 10E6/UL (ref 4.4–5.9)
SA TARGET DNA: NEGATIVE
SODIUM SERPL-SCNC: 138 MMOL/L (ref 136–145)
SODIUM SERPL-SCNC: 139 MMOL/L (ref 136–145)
SODIUM SERPL-SCNC: 139 MMOL/L (ref 136–145)
TACROLIMUS BLD-MCNC: 4.2 UG/L (ref 5–15)
TME LAST DOSE: ABNORMAL H
TME LAST DOSE: ABNORMAL H
WBC # BLD AUTO: 10.1 10E3/UL (ref 4–11)
WBC # BLD AUTO: 13.5 10E3/UL (ref 4–11)
WBC # BLD AUTO: 13.6 10E3/UL (ref 4–11)

## 2023-06-08 PROCEDURE — 82248 BILIRUBIN DIRECT: CPT | Performed by: SURGERY

## 2023-06-08 PROCEDURE — 85384 FIBRINOGEN ACTIVITY: CPT | Performed by: SURGERY

## 2023-06-08 PROCEDURE — 84100 ASSAY OF PHOSPHORUS: CPT | Performed by: SURGERY

## 2023-06-08 PROCEDURE — 999N000065 XR CHEST PORT 1 VIEW

## 2023-06-08 PROCEDURE — 82330 ASSAY OF CALCIUM: CPT | Performed by: SURGERY

## 2023-06-08 PROCEDURE — 87517 HEPATITIS B DNA QUANT: CPT | Performed by: INTERNAL MEDICINE

## 2023-06-08 PROCEDURE — 84155 ASSAY OF PROTEIN SERUM: CPT | Performed by: SURGERY

## 2023-06-08 PROCEDURE — 85049 AUTOMATED PLATELET COUNT: CPT | Performed by: SURGERY

## 2023-06-08 PROCEDURE — 85610 PROTHROMBIN TIME: CPT | Performed by: SURGERY

## 2023-06-08 PROCEDURE — 97535 SELF CARE MNGMENT TRAINING: CPT | Mod: GO

## 2023-06-08 PROCEDURE — 85027 COMPLETE CBC AUTOMATED: CPT | Performed by: SURGERY

## 2023-06-08 PROCEDURE — 99233 SBSQ HOSP IP/OBS HIGH 50: CPT | Mod: 24 | Performed by: SURGERY

## 2023-06-08 PROCEDURE — 250N000011 HC RX IP 250 OP 636: Performed by: SURGERY

## 2023-06-08 PROCEDURE — 250N000013 HC RX MED GY IP 250 OP 250 PS 637

## 2023-06-08 PROCEDURE — 82247 BILIRUBIN TOTAL: CPT | Performed by: SURGERY

## 2023-06-08 PROCEDURE — 83735 ASSAY OF MAGNESIUM: CPT | Performed by: SURGERY

## 2023-06-08 PROCEDURE — 87350 HEPATITIS BE AG IA: CPT | Performed by: INTERNAL MEDICINE

## 2023-06-08 PROCEDURE — 250N000011 HC RX IP 250 OP 636: Performed by: NURSE PRACTITIONER

## 2023-06-08 PROCEDURE — 87340 HEPATITIS B SURFACE AG IA: CPT | Performed by: TRANSPLANT SURGERY

## 2023-06-08 PROCEDURE — 97165 OT EVAL LOW COMPLEX 30 MIN: CPT | Mod: GO

## 2023-06-08 PROCEDURE — 99232 SBSQ HOSP IP/OBS MODERATE 35: CPT | Mod: 24 | Performed by: TRANSPLANT SURGERY

## 2023-06-08 PROCEDURE — 85730 THROMBOPLASTIN TIME PARTIAL: CPT | Performed by: TRANSPLANT SURGERY

## 2023-06-08 PROCEDURE — 250N000011 HC RX IP 250 OP 636: Performed by: STUDENT IN AN ORGANIZED HEALTH CARE EDUCATION/TRAINING PROGRAM

## 2023-06-08 PROCEDURE — 85384 FIBRINOGEN ACTIVITY: CPT | Performed by: TRANSPLANT SURGERY

## 2023-06-08 PROCEDURE — 999N000015 HC STATISTIC ARTERIAL MONITORING DAILY

## 2023-06-08 PROCEDURE — 250N000009 HC RX 250: Performed by: SURGERY

## 2023-06-08 PROCEDURE — 84460 ALANINE AMINO (ALT) (SGPT): CPT | Performed by: SURGERY

## 2023-06-08 PROCEDURE — 999N000248 HC STATISTIC IV INSERT WITH US BY RN

## 2023-06-08 PROCEDURE — 97530 THERAPEUTIC ACTIVITIES: CPT | Mod: GP | Performed by: PHYSICAL THERAPIST

## 2023-06-08 PROCEDURE — 99233 SBSQ HOSP IP/OBS HIGH 50: CPT | Mod: FS

## 2023-06-08 PROCEDURE — 258N000003 HC RX IP 258 OP 636: Performed by: SURGERY

## 2023-06-08 PROCEDURE — 84075 ASSAY ALKALINE PHOSPHATASE: CPT | Performed by: SURGERY

## 2023-06-08 PROCEDURE — 999N000155 HC STATISTIC RAPCV CVP MONITORING

## 2023-06-08 PROCEDURE — 250N000013 HC RX MED GY IP 250 OP 250 PS 637: Performed by: SURGERY

## 2023-06-08 PROCEDURE — 85730 THROMBOPLASTIN TIME PARTIAL: CPT | Performed by: SURGERY

## 2023-06-08 PROCEDURE — 80197 ASSAY OF TACROLIMUS: CPT | Performed by: SURGERY

## 2023-06-08 PROCEDURE — 258N000003 HC RX IP 258 OP 636: Performed by: NURSE PRACTITIONER

## 2023-06-08 PROCEDURE — 250N000013 HC RX MED GY IP 250 OP 250 PS 637: Performed by: NURSE PRACTITIONER

## 2023-06-08 PROCEDURE — 200N000002 HC R&B ICU UMMC

## 2023-06-08 PROCEDURE — 83605 ASSAY OF LACTIC ACID: CPT | Performed by: SURGERY

## 2023-06-08 PROCEDURE — 97161 PT EVAL LOW COMPLEX 20 MIN: CPT | Mod: GP | Performed by: PHYSICAL THERAPIST

## 2023-06-08 PROCEDURE — 71045 X-RAY EXAM CHEST 1 VIEW: CPT | Mod: 26 | Performed by: RADIOLOGY

## 2023-06-08 PROCEDURE — 250N000011 HC RX IP 250 OP 636

## 2023-06-08 PROCEDURE — 250N000012 HC RX MED GY IP 250 OP 636 PS 637: Performed by: TRANSPLANT SURGERY

## 2023-06-08 PROCEDURE — 85610 PROTHROMBIN TIME: CPT | Performed by: TRANSPLANT SURGERY

## 2023-06-08 PROCEDURE — 87641 MR-STAPH DNA AMP PROBE: CPT | Performed by: SURGERY

## 2023-06-08 RX ORDER — ONDANSETRON 4 MG/1
4 TABLET, FILM COATED ORAL EVERY 6 HOURS PRN
Status: DISCONTINUED | OUTPATIENT
Start: 2023-06-08 | End: 2023-06-12

## 2023-06-08 RX ORDER — SENNOSIDES 8.6 MG
8.6 TABLET ORAL 2 TIMES DAILY
Status: DISCONTINUED | OUTPATIENT
Start: 2023-06-08 | End: 2023-06-12

## 2023-06-08 RX ORDER — MIDODRINE HYDROCHLORIDE 2.5 MG/1
2.5 TABLET ORAL EVERY 8 HOURS
Status: DISCONTINUED | OUTPATIENT
Start: 2023-06-08 | End: 2023-06-09

## 2023-06-08 RX ORDER — METHOCARBAMOL 500 MG/1
500 TABLET, FILM COATED ORAL 4 TIMES DAILY
Status: DISCONTINUED | OUTPATIENT
Start: 2023-06-08 | End: 2023-06-13

## 2023-06-08 RX ORDER — DIPHENHYDRAMINE HCL 12.5MG/5ML
25-50 LIQUID (ML) ORAL ONCE
Status: COMPLETED | OUTPATIENT
Start: 2023-06-08 | End: 2023-06-08

## 2023-06-08 RX ORDER — ONDANSETRON 2 MG/ML
4 INJECTION INTRAMUSCULAR; INTRAVENOUS EVERY 6 HOURS PRN
Status: DISCONTINUED | OUTPATIENT
Start: 2023-06-08 | End: 2023-06-12

## 2023-06-08 RX ORDER — METOPROLOL TARTRATE 25 MG/1
25 TABLET, FILM COATED ORAL 2 TIMES DAILY
Status: DISCONTINUED | OUTPATIENT
Start: 2023-06-08 | End: 2023-06-09

## 2023-06-08 RX ORDER — BISACODYL 10 MG
10 SUPPOSITORY, RECTAL RECTAL DAILY PRN
Status: DISCONTINUED | OUTPATIENT
Start: 2023-06-08 | End: 2023-06-25 | Stop reason: HOSPADM

## 2023-06-08 RX ORDER — HEPARIN SODIUM 5000 [USP'U]/.5ML
5000 INJECTION, SOLUTION INTRAVENOUS; SUBCUTANEOUS EVERY 12 HOURS
Status: DISCONTINUED | OUTPATIENT
Start: 2023-06-08 | End: 2023-06-15

## 2023-06-08 RX ORDER — DIPHENHYDRAMINE HCL 25 MG
25-50 CAPSULE ORAL ONCE
Status: COMPLETED | OUTPATIENT
Start: 2023-06-08 | End: 2023-06-08

## 2023-06-08 RX ORDER — POLYETHYLENE GLYCOL 3350 17 G/17G
17 POWDER, FOR SOLUTION ORAL DAILY
Status: DISCONTINUED | OUTPATIENT
Start: 2023-06-08 | End: 2023-06-12

## 2023-06-08 RX ORDER — MIDODRINE HYDROCHLORIDE 2.5 MG/1
2.5 TABLET ORAL DAILY
Status: DISCONTINUED | OUTPATIENT
Start: 2023-06-08 | End: 2023-06-08

## 2023-06-08 RX ORDER — ACETAMINOPHEN 325 MG/1
650 TABLET ORAL ONCE
Status: COMPLETED | OUTPATIENT
Start: 2023-06-08 | End: 2023-06-08

## 2023-06-08 RX ADMIN — CALCIUM CHLORIDE, MAGNESIUM CHLORIDE, SODIUM CHLORIDE, SODIUM BICARBONATE, POTASSIUM CHLORIDE AND SODIUM PHOSPHATE DIBASIC DIHYDRATE 12.5 ML/KG/HR: 3.68; 3.05; 6.34; 3.09; .314; .187 INJECTION INTRAVENOUS at 00:59

## 2023-06-08 RX ADMIN — METOPROLOL TARTRATE 25 MG: 25 TABLET, FILM COATED ORAL at 19:55

## 2023-06-08 RX ADMIN — METHOCARBAMOL 500 MG: 500 TABLET ORAL at 16:18

## 2023-06-08 RX ADMIN — MYCOPHENOLATE MOFETIL 750 MG: 200 POWDER, FOR SUSPENSION ORAL at 07:52

## 2023-06-08 RX ADMIN — CALCIUM CHLORIDE, MAGNESIUM CHLORIDE, SODIUM CHLORIDE, SODIUM BICARBONATE, POTASSIUM CHLORIDE AND SODIUM PHOSPHATE DIBASIC DIHYDRATE 12.5 ML/KG/HR: 3.68; 3.05; 6.34; 3.09; .314; .187 INJECTION INTRAVENOUS at 08:28

## 2023-06-08 RX ADMIN — MICAFUNGIN SODIUM 100 MG: 50 INJECTION, POWDER, LYOPHILIZED, FOR SOLUTION INTRAVENOUS at 01:59

## 2023-06-08 RX ADMIN — CALCIUM GLUCONATE 2 G: 20 INJECTION, SOLUTION INTRAVENOUS at 04:56

## 2023-06-08 RX ADMIN — HUMAN INSULIN 1 UNITS/HR: 100 INJECTION, SOLUTION SUBCUTANEOUS at 12:12

## 2023-06-08 RX ADMIN — PIPERACILLIN AND TAZOBACTAM 3.38 G: 3; .375 INJECTION, POWDER, LYOPHILIZED, FOR SOLUTION INTRAVENOUS at 06:05

## 2023-06-08 RX ADMIN — CALCIUM CHLORIDE, MAGNESIUM CHLORIDE, SODIUM CHLORIDE, SODIUM BICARBONATE, POTASSIUM CHLORIDE AND SODIUM PHOSPHATE DIBASIC DIHYDRATE 12.5 ML/KG/HR: 3.68; 3.05; 6.34; 3.09; .314; .187 INJECTION INTRAVENOUS at 16:16

## 2023-06-08 RX ADMIN — SENNOSIDES 8.6 MG: 8.6 TABLET, COATED ORAL at 19:55

## 2023-06-08 RX ADMIN — METHOCARBAMOL 500 MG: 500 TABLET ORAL at 19:55

## 2023-06-08 RX ADMIN — PHENOL 1 ML: 1.4 SPRAY ORAL at 20:26

## 2023-06-08 RX ADMIN — METHYLPREDNISOLONE SODIUM SUCCINATE 100 MG: 125 INJECTION INTRAMUSCULAR; INTRAVENOUS at 07:51

## 2023-06-08 RX ADMIN — HEPARIN SODIUM 5000 UNITS: 5000 INJECTION, SOLUTION INTRAVENOUS; SUBCUTANEOUS at 19:55

## 2023-06-08 RX ADMIN — SENNOSIDES 8.6 MG: 8.6 TABLET, COATED ORAL at 07:52

## 2023-06-08 RX ADMIN — PHENOL 1 ML: 1.4 SPRAY ORAL at 11:50

## 2023-06-08 RX ADMIN — ANTI-THYMOCYTE GLOBULIN (RABBIT) 100 MG: 5 INJECTION, POWDER, LYOPHILIZED, FOR SOLUTION INTRAVENOUS at 12:22

## 2023-06-08 RX ADMIN — ACETAMINOPHEN 650 MG: 325 TABLET, FILM COATED ORAL at 11:52

## 2023-06-08 RX ADMIN — CALCIUM CHLORIDE, MAGNESIUM CHLORIDE, SODIUM CHLORIDE, SODIUM BICARBONATE, POTASSIUM CHLORIDE AND SODIUM PHOSPHATE DIBASIC DIHYDRATE 12.5 ML/KG/HR: 3.68; 3.05; 6.34; 3.09; .314; .187 INJECTION INTRAVENOUS at 16:17

## 2023-06-08 RX ADMIN — METHOCARBAMOL 500 MG: 500 TABLET ORAL at 11:52

## 2023-06-08 RX ADMIN — CARBIDOPA AND LEVODOPA 2.5 MG: 50; 200 TABLET, EXTENDED RELEASE ORAL at 19:56

## 2023-06-08 RX ADMIN — Medication 40 MG: at 07:53

## 2023-06-08 RX ADMIN — PIPERACILLIN AND TAZOBACTAM 3.38 G: 3; .375 INJECTION, POWDER, LYOPHILIZED, FOR SOLUTION INTRAVENOUS at 11:53

## 2023-06-08 RX ADMIN — METHOCARBAMOL 500 MG: 500 TABLET ORAL at 07:52

## 2023-06-08 RX ADMIN — CALCIUM CHLORIDE, MAGNESIUM CHLORIDE, SODIUM CHLORIDE, SODIUM BICARBONATE, POTASSIUM CHLORIDE AND SODIUM PHOSPHATE DIBASIC DIHYDRATE 12.5 ML/KG/HR: 3.68; 3.05; 6.34; 3.09; .314; .187 INJECTION INTRAVENOUS at 04:49

## 2023-06-08 RX ADMIN — ONDANSETRON 4 MG: 2 INJECTION INTRAMUSCULAR; INTRAVENOUS at 04:57

## 2023-06-08 RX ADMIN — ASPIRIN 325 MG: 325 TABLET ORAL at 07:51

## 2023-06-08 RX ADMIN — Medication 5 ML: at 07:53

## 2023-06-08 RX ADMIN — CALCIUM CHLORIDE, MAGNESIUM CHLORIDE, SODIUM CHLORIDE, SODIUM BICARBONATE, POTASSIUM CHLORIDE AND SODIUM PHOSPHATE DIBASIC DIHYDRATE 12.5 ML/KG/HR: 3.68; 3.05; 6.34; 3.09; .314; .187 INJECTION INTRAVENOUS at 20:14

## 2023-06-08 RX ADMIN — CALCIUM CHLORIDE, MAGNESIUM CHLORIDE, SODIUM CHLORIDE, SODIUM BICARBONATE, POTASSIUM CHLORIDE AND SODIUM PHOSPHATE DIBASIC DIHYDRATE 12.5 ML/KG/HR: 3.68; 3.05; 6.34; 3.09; .314; .187 INJECTION INTRAVENOUS at 00:57

## 2023-06-08 RX ADMIN — PIPERACILLIN AND TAZOBACTAM 3.38 G: 3; .375 INJECTION, POWDER, LYOPHILIZED, FOR SOLUTION INTRAVENOUS at 17:44

## 2023-06-08 RX ADMIN — CALCIUM CHLORIDE, MAGNESIUM CHLORIDE, SODIUM CHLORIDE, SODIUM BICARBONATE, POTASSIUM CHLORIDE AND SODIUM PHOSPHATE DIBASIC DIHYDRATE 12.5 ML/KG/HR: 3.68; 3.05; 6.34; 3.09; .314; .187 INJECTION INTRAVENOUS at 04:50

## 2023-06-08 RX ADMIN — CALCIUM CHLORIDE, MAGNESIUM CHLORIDE, SODIUM CHLORIDE, SODIUM BICARBONATE, POTASSIUM CHLORIDE AND SODIUM PHOSPHATE DIBASIC DIHYDRATE 12.5 ML/KG/HR: 3.68; 3.05; 6.34; 3.09; .314; .187 INJECTION INTRAVENOUS at 12:15

## 2023-06-08 RX ADMIN — BISACODYL 10 MG: 10 SUPPOSITORY RECTAL at 13:11

## 2023-06-08 RX ADMIN — MYCOPHENOLATE MOFETIL 750 MG: 200 POWDER, FOR SUSPENSION ORAL at 17:44

## 2023-06-08 RX ADMIN — DIPHENHYDRAMINE HYDROCHLORIDE 50 MG: 25 SOLUTION ORAL at 11:52

## 2023-06-08 RX ADMIN — VALGANCICLOVIR 450 MG: 50 FOR SOLUTION ORAL at 07:51

## 2023-06-08 RX ADMIN — CALCIUM CHLORIDE, MAGNESIUM CHLORIDE, SODIUM CHLORIDE, SODIUM BICARBONATE, POTASSIUM CHLORIDE AND SODIUM PHOSPHATE DIBASIC DIHYDRATE 12.5 ML/KG/HR: 3.68; 3.05; 6.34; 3.09; .314; .187 INJECTION INTRAVENOUS at 20:13

## 2023-06-08 RX ADMIN — OXYCODONE HYDROCHLORIDE 5 MG: 5 TABLET ORAL at 00:29

## 2023-06-08 RX ADMIN — CARBIDOPA AND LEVODOPA 2.5 MG: 50; 200 TABLET, EXTENDED RELEASE ORAL at 12:41

## 2023-06-08 RX ADMIN — CALCIUM GLUCONATE 2 G: 20 INJECTION, SOLUTION INTRAVENOUS at 21:16

## 2023-06-08 RX ADMIN — CALCIUM CHLORIDE, MAGNESIUM CHLORIDE, SODIUM CHLORIDE, SODIUM BICARBONATE, POTASSIUM CHLORIDE AND SODIUM PHOSPHATE DIBASIC DIHYDRATE: 3.68; 3.05; 6.34; 3.09; .314; .187 INJECTION INTRAVENOUS at 02:54

## 2023-06-08 RX ADMIN — POLYETHYLENE GLYCOL 3350 17 G: 17 POWDER, FOR SOLUTION ORAL at 07:51

## 2023-06-08 RX ADMIN — PIPERACILLIN AND TAZOBACTAM 3.38 G: 3; .375 INJECTION, POWDER, LYOPHILIZED, FOR SOLUTION INTRAVENOUS at 00:54

## 2023-06-08 RX ADMIN — SULFAMETHOXAZOLE AND TRIMETHOPRIM 1 TABLET: 400; 80 TABLET ORAL at 07:51

## 2023-06-08 RX ADMIN — METOPROLOL TARTRATE 25 MG: 25 TABLET, FILM COATED ORAL at 07:51

## 2023-06-08 ASSESSMENT — ACTIVITIES OF DAILY LIVING (ADL)
ADLS_ACUITY_SCORE: 36
ADLS_ACUITY_SCORE: 32
ADLS_ACUITY_SCORE: 34
ADLS_ACUITY_SCORE: 32
ADLS_ACUITY_SCORE: 34
ADLS_ACUITY_SCORE: 32
ADLS_ACUITY_SCORE: 34
ADLS_ACUITY_SCORE: 32

## 2023-06-08 NOTE — PLAN OF CARE
Major Shift Events: Oriented x4, moving all extremities and PERRLA, 2-3mm. A fib 80-150s, soft BPs, norepi intermittently to maintain MAP goal, afebrile. RA- 3 LPM NC, congestive cough. Bedside swallow successful, clear liquid diet. NG placed per Dietitian- clamped for meds. Active bowel sounds. Lux with no output-SICU aware. CRRT goal I=O via left tunneled access. Midline incision covered, leaking. R wound vac with no output. L MAE with 200 ml serosang output today. Generalized bruising/petechiae, skin tears and swelling- face/neck 3-4+. R internal jugular infusing norepi and insulin gtt A3. L PIV infusing TKO for abx/meds. L art line with pulsatile waveform. PRN oxy given for incisional pain x1. 4G mag given for rapid a fib rate and restarted half home metoprolol dose. Ca gluc given per CRRT replacements. Liver and Kidney US at bedside today. [place] removed w/MAC introducer. Extubation today @ 1400.    Plan: Start TF, thymo and continue with CRRT goals  For vital signs and complete assessments, please see documentation flowsheets.

## 2023-06-08 NOTE — PROGRESS NOTES
SURGICAL ICU PROGRESS NOTE  2023      PRIMARY TEAM: Transplant Surgery  PRIMARY PHYSICIAN: Dr. Clifton  REASON FOR CRITICAL CARE ADMISSION: Close hemodynamic monitoring, mechanical ventilation  ADMITTING PHYSICIAN: Dr. 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 PMHx also includes coronary artery disease, alcohol overuse, obesity, ESRD on HD  (IgA nephropathy + ANCA vasculitis), psoriatic arthritis, paroxysmal atrial fibrillation (on warfarin), DVT, recurrent c diff, and HTN.  He is now s/p  donor liver and kidney transplant on 23 with Dr. Clifton.  Intraop:   ml  500 ml Cell saver returned  4 uPRBC  2uPlt  1 uFFP  5L crystalloid  UOP 40cc prior to kidney, 90cc post kidney    PLAN:    Updates today (2023)  - Rewire RIJ MAC -> triple lumen CVC  - Continue CRRT, pull fluid today  - Restart PTA midodrine 2.5mg for relative hypotension with pulling fluid, will be TID while on CRRT per pharmacy  - Add throat spray  - Add robaxin  - Regular diet  - Bowel regimen  - Encourage increased time OOB today    Neurological:  # Acute post-surgical pain   # Hx Alcohol use disorder, sober since 2016  - Monitor neurological status. Delirium preventions and precautions  - Sedation plan: Not indicated  - Pain control: fentanyl gtt discontinued with extubation. Hold tylenol for now per transplant until LFTS start trending down. PRN dilaudid 0.2-0.4 q2h, oxycodone 5-10mg q4h, robaxin    Pulmonary:   # Post operative ventilatory support, resolved  # Acute hypoxic respiratory failure requiring mechanical ventilation, resolved  - Successfully extubated  to NC, saturating well on room air today  - Aggressive pulmonary  hygiene, IS, encourage OOB  - Supplemental O2 as needed to maintain SpO2 > 92%    Cardiovascular:    #Hx pAfib on PTA warfarin  # Afib with RVR, improving  #Hx CAD  #Hx HTN  #Hypotension, orthostatic  #Shock-distributive vs hypovolemic, resolved  - ECHO 23: afib, LVEF 55-60%  - CVP goal 8-12  - MAP goal 60-85, SBP goal 110-160. Off pressor since about .   - PTA metoprolol XL 25 daily, started metoprolol 12.5 BID , HR still 120-130s this AM. Increase dose to 25 BID today.   - orthostatic hypotension this morning when getting up to chair, add midodrine 2.5mg TID while on CRRT  - Continue to hold PTA warfarin  - ASA, PTA atorvastatin    Gastroenterology/Nutrition:  # Nausea  # At risk for malnutrition  # Hx of decompensated alcohol related + hereditary hemochromatosis (homozygous H63D) cirrhosis (MELD-Na 30) complicated by variceal bleeding s/p TIPS (10/1/2022) and hepatic encephalopathy now s/p DDLT   -  repeat liver US shows persistent low resistance waveforms and low resistive indices throughout hepatic artery system concerning for ischemia, stable velocities and upstroke  - MAE drain x1, decreasing output, continue to monitor and document qshift  - q12h labs  - Nutrition consulted and following, appreciate input. NJ placed , advancing TF to goal today.   - Advance to regular diet today  - PPI     Renal/ Fluids/Electrolytes:   #Lactic acidosis, resolved  # Hx ESRD on HD MWF (IgA nephropathy + ANCA vasculitis) now s/p DDKT 23  # Delayed graft function  - Expect some delayed graft function with  donor kidney, still not making much urine, will continue to closely monitor  - Continue CRRT, I=O yesterday, will start to pull fluid today  - transplant nephrology consulted and following  - Lux catheter to remain in place until at least POD5 per transplant      Endocrine:  # Stress hyperglycemia   -Insulin drip for blood glucose management, continue today while advancing TF and still on  steroid taper. Goal to keep BG< 180 for optimal wound healing      ID:  #Stress leukocytosis, improving  #Post-transplant ppx per transplant  - WBC 13.6 from 16.4  -Perioperative antibiotics: Zosyn x 48 hrs, micafungin x 14 days  -Immunosuppression per transplant: thymoglobulin, tacrolimus 3mg BID, methylprednisolone taper  -Prophylactic regimen: Valganciclovir, Bactrim Ppx    Heme:     # Acute blood loss anemia   # Anemia of critical illness and chronic renal disease  # Hx hereditary hemochromatosis   # Thrombocytopenia 2/2 liver dysfunction  - Transfusion goals: INR <2, Platelets > 20, Fibrinogen > 200, Hgb >8.0  - Hgb stable 8.9, Plt 76 (81), INR 1.98    Rheumatologic:  #Hx Gout  - hold PTA prednisone 10mg daily    Musculoskeletal:  # Weakness and deconditioning of critical illness   - Physical and occupational therapy consult, appreciate recommendations. Encourage increased time OOB.     Skin:  -Diligent skin care to prevent breakdown    General Cares/Prophylaxis:    DVT Prophylaxis: Mechanical, SCDs  GI Prophylaxis: PPI  Restraints: Restraints for medical healing needed: NO    Lines/ tubes/ drains:  -PIVs  -left radial Arterial line (remove today)  -PTA Left tunneled IJ dilaysis line  -Right internal jugular MAC (rewire to CVC today)  -Lux catheter  -Intraabdominal MAE drainx1   -incisional wound vac (kidney incision)    Disposition:  - Surgical ICU  - Barriers to leaving ICU: CRRT    Discussed with surgical ICU staff, Dr. Brower- Gely Peña MD  General Surgery PGY-2      - - - - - - - - - - - - - - - - - - - - - - - - - - - - - - - - - - - - - - - - - - - - - -   INTERVAL EVENTS/SUBJECTIVE: No acute events. Some mild nausea this morning. Pain well controlled.       PHYSICAL EXAMINATION:  Temp:  [97  F (36.1  C)-98.6  F (37  C)] 97.5  F (36.4  C)  Pulse:  [] 125  Resp:  [14-20] 14  BP: ()/(45-84) 113/84  MAP:  [64 mmHg-101 mmHg] 84 mmHg  Arterial Line BP: ()/(54-81)  116/64  FiO2 (%):  [40 %] 40 %  SpO2:  [92 %-100 %] 93 %     General: NAD, alert and interactive  HEENT: NJ in place  Neck: right MAC x1  Pulm/Resp: Breathing non-labored on RA, equal chest rise  CV: irregularly irregular rhythm on monitor, tachycardic to 110-120s  Abdomen: soft, chevron incision covered with dressing with some serosang drainage, RLQ prevena holding suctoin, MAE x 1 to bulb suction with serosanguinous output, soft reducible umbilical hernia  : stauffer catheter in place with hematuria  MSK/Extremities: peripheral edema, peripheral pulses intact, extremities well perfused, SCDs in place  Neuro: PERRL, alert and oriented, speech clear, no gross deficits appreciated    LABS: Reviewed.   Arterial Blood Gases   Recent Labs   Lab 06/07/23  1637 06/07/23  1206 06/07/23  0757 06/07/23  0341   PH 7.43 7.44 7.46* 7.39   PCO2 33* 32* 31* 33*   PO2 71* 129* 127* 171*   HCO3 22 22 22 20*     Complete Blood Count   Recent Labs   Lab 06/08/23  0415 06/08/23  0013 06/07/23  1647 06/07/23  1208 06/07/23  0758 06/07/23  0330   WBC 13.6*  --  16.4* 14.4*  --  18.2*   HGB 8.5*  --  8.9* 8.9* 7.9* 8.5*   PLT 60* 68* 76* 81* 73* 130*     Basic Metabolic Panel  Recent Labs   Lab 06/08/23  0704 06/08/23  0608 06/08/23  0418 06/08/23  0415 06/07/23  1904 06/07/23  1647 06/07/23  1212 06/07/23  1208 06/07/23  0337 06/07/23  0330   NA  --   --   --  138  --  138  --  137  137  --  138   POTASSIUM  --   --   --  4.6  --  4.7  --  4.5  4.5  --  4.4   CHLORIDE  --   --   --  106  --  104  --  107  107  --  105   CO2  --   --   --  21*  --  20*  --  18*  18*  --  17*   BUN  --   --   --  28.0*  --  26.4*  --  26.2*  26.2*  --  32.0*   CR  --   --   --  1.84*  --  2.11*  --  2.19*  2.19*  --  2.76*   GLC 91 87 125* 113*   < > 125*   < > 114*  114*   < > 167*    < > = values in this interval not displayed.     Liver Function Tests  Recent Labs   Lab 06/08/23  0415 06/08/23  0013 06/07/23 2013 06/07/23  1647 06/07/23  1208  06/07/23  0758 06/07/23  0330 06/06/23  2108 06/06/23  1528 06/06/23  0547   *  --   --   --   --   --  1,834* 1,276*  --  157*   *  --   --   --   --   --  654* 624*  --  76*   ALKPHOS 107  --   --   --   --   --  81 99  --  203*   BILITOTAL 1.7*  --   --   --   --   --  1.6* 1.7*  --  0.6   ALBUMIN 2.3*  --   --  2.3* 2.1*  --  2.0* 2.1*  --  2.7*   INR 1.98* 1.96* 2.06* 2.18* 2.59*   < > 2.02* 2.17*   < > 3.06*    < > = values in this interval not displayed.     Pancreatic Enzymes  Recent Labs   Lab 06/07/23  0330 06/05/23  1605   LIPASE 59  --    AMYLASE 90 153*     Coagulation Profile  Recent Labs   Lab 06/08/23  0415 06/08/23  0013 06/07/23 2013 06/07/23  1647   INR 1.98* 1.96* 2.06* 2.18*   PTT 38 40* 36 38       IMAGING:  Recent Results (from the past 24 hour(s))   US Renal Transplant with Doppler    Narrative    EXAMINATION: US RENAL TRANSPLANT,  6/7/2023 11:29 AM     COMPARISON: Earlier same day renal transplant ultrasound, renal  transplant ultrasound 6/6/2023    HISTORY: Evaluate flows post kidney transplant    TECHNIQUE:  Grey-scale, color Doppler and spectral flow analysis.    FINDINGS:  Examination is limited due to right lower quadrant wound VAC,  positioning of the renal transplant, and patient body habitus.    The transplant kidney is located right lower quadrant, and measures 10  cm. Parenchyma is of normal thickness and echogenicity. No focal  lesions. No hydronephrosis. No perinephric fluid collection.    There are three renal arteries, all of which show normal waveform and  velocities at the hilum. The renal artery anastomosis is not  visualized.    Renal artery 1 flow:   14 cm/s peak systolic at hilum and RI 0.80.   Anastomosis is not confidently identified.    Renal artery 2 flow:   15 cm/s peak systolic at hilum and RI 0.78.  Anastomosis is not confidently identified.    Renal artery 3:  40 cm/s peak systolic velocity at the hilum and RI 0.82    Arcuate artery resistive  indices (upper to lower): 0.71, 0.72, 0.69    Renal Vein Flow:  5-11 cm/s at hilum.   123 cm/s at anastomosis. Previously 40 cm/s.    Iliac artery flow:  52 cm/s peak systolic above anastomosis.  44 cm/s peak systolic below anastomosis.    Iliac vein flow:  Patent above and below the anastomosis.      Impression    IMPRESSION:   Limited exam due to right lower quadrant wound VAC, kidney position,  and patient body habitus.    1. Patent visualized renal transplant vasculature. The anastomosis of  the renal arteries are not confidently identified however there is  normal low resistant arterial waveforms in the three renal arteries at  the hilum and in the arcuate arteries suggesting no significant  proximal stenosis.  2. Upper limits of normal resistive indices likely representing  postoperative edema.  3. Slightly elevated velocity in the renal vein at the anastomosis,  likely due to postoperative edema. Attention on follow-up.  4. Normal sonographic appearance of the right lower quadrant renal  transplant without perinephric fluid collection.    I have personally reviewed the examination and initial interpretation  and I agree with the findings.    ADRIANA BARROS MD         SYSTEM ID:  [id number]   US Liver Transplant Follow Up    Narrative    EXAMINATION: US LIVER TRANSPLANT FOLLOW UP, 6/7/2023 11:29 AM     COMPARISON: 6/6/2023    HISTORY: Evaluate flows, rising liver studies    TECHNIQUE:  Gray-scale, color Doppler and spectral flow analysis.    FINDINGS:   Limited evaluation secondary to body habitus.    There is no ascites.    Liver:   The liver demonstrates normal homogeneous echotexture. No  evidence of a focal hepatic mass.     Bile Ducts: Visualized intra- and extrahepatic biliary system are of  normal caliber.  The common bile duct is not well-seen    Gallbladder: The gallbladder is surgically absent.    Pancreas: Pancreas is not visualized.    Visualized portions of the aorta are unremarkable.    LIVER  DOPPLER:  Splenic vein:  Patent continuous normal antegrade direction flow  towards the liver, 24 cm/sec.  Extrahepatic portal vein:  Patent continuous antegrade flow, 50  cm/sec.  Portal vein at anastomosis: Patent continuous antegrade flow, 48  cm/sec.  Intrahepatic portal vein:  Patent continuous antegrade flow, 40  cm/sec.  Right portal vein flow is antegrade, measuring 26 cm/sec.  Left portal vein flow is antegrade, measuring 10 cm/sec.    Inferior vena cava: patent with flow toward the heart throughout..  IVC at anastomosis:  105 cm/sec.  Intrahepatic IVC:  40 cm/sec.  Extrahepatic IVC:  36 cm/sec.    Right, mid, left hepatic veins: Patent with flow towards the inferior  vena cava.    Extrahepatic hepatic artery: Low resistance waveform with flow towards  the liver. 31 cm/sec with resistive index 0.3.  Right hepatic artery: 40 cm/sec with resistive index 0.32.  Left hepatic artery: 74 cm/sec with resistive index 0.46.      Impression    Impression:   1.  Limited unremarkable grayscale appearance of transplanted liver.  2.  Persistent low resistant waveforms and low resistive indices  throughout the hepatic artery system is concerning for ischemia.  Velocities and upstroke however are stable. No focal area of increased  velocity identified. Continued short-term follow-up recommended        I have personally reviewed the examination and initial interpretation  and I agree with the findings.    ADRIANA BARROS MD         SYSTEM ID:  [id number]   XR Abdomen Port 1 View    Narrative    Exam: XR ABDOMEN PORT 1 VIEW, 6/7/2023 1:59 PM    Indication: Verify small bowel feeding tube bedside placement    Comparison: 6/6/2023    Findings:   Portable supine radiograph of the abdomen. Left hemiabdomen excluded  from field-of-view. Small bowel feeding tube terminates over the  expected location distal pylorus/proximal duodenum. Right upper  quadrant surgical drain in stable position. Partially visualized  gastric tube.  Abdominal wall skin staples.  Scattered air filled loops of bowel in the abdomen, nonspecific.  Portal venous gas or pneumatosis. Multilevel degenerative changes of  spine. Partially visualized stent in the pelvis over the expected  location of the bladder.      Impression    Impression: Small bowel feeding tube terminates over the expected  location of the distal pylorus/proximal duodenum, consider  advancement.    I have personally reviewed the examination and initial interpretation  and I agree with the findings.    RIKI OSPINA DO         SYSTEM ID:  [id number]

## 2023-06-08 NOTE — PROGRESS NOTES
[facility]   Transplant Nephrology Progress Note  Date of Admission:  6/5/2023  Today's Date: 06/08/2023    Recommendations:  - Agree with restarting home dose of midodrine.   - Increase fluid removal on CRRT to net 0-50 ml/hr as tolerated, keep MAP > 65 (ordered).  - Consider transitioning to intermittent dialysis tomorrow if BP adequate.  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 Remove 4-6 weeks post transplant.   -Pt taken back to OR on 6/7/23 due to lack of blood flow to the kidney transplant but intra op U/S normal.    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     # Immunosuppression: Mycophenolate mofetil (dose 750 mg every 12 hours) and Methylprednisolone (dose taper)    - Induction with Recent Transplant:  Plan for rATG 1mg/kg daily for total 6mg/kg   - Changes: Yes - tac held 2/2 DGF per liver transplant team. Plan to give rATG 6mg/kg total dose. Wean pred down to 5mg daily and then would attempt to wean further but would get leisa stim prior to starting because of length of time he has been  on chronic prednisone    # Infection Prophylaxis:   - PJP: Sulfa/TMP (Bactrim)  - CMV: Valganciclovir (Valcyte), CMV IgG Ab positive  - Thrush: Micafungin (Mycamine)  - Fungal: Micafungin (Mycamine)    # History of C.diff:   -Consider PO vanc with abx    # ANCA vasculitis:   -S/p Rituximab x2   -Obtain intermittent U/A     # Paroxysmal A-Fib: On coumadin and metoprolol ER 25 mg daily PTA. Consider switching to eliquis when kidney and liver are functioning   -Restart metoprolol today due to Afib w/ RVR    # Blood pressure: Controlled, but low at times;  Goal BP: MAP > 65   - Volume status: Total body volume up, but intravascularly hypovolemic  EDW ~ 96.4 kg (on HD)   - Changes: Agree with restarting home dose of midodrine. Metoprolol tartrate 25 mg PO BID started this morning for afib w/ RVR    # Elevated Blood Glucose: Glucose generally running ~ 160s-200   - Management as per primary team.  On insulin gtt.    # Anemia in Chronic Renal Disease and acute blood loss: Hgb: Stable, low      FEDERICO: No   - Iron studies: Unknown at this time, but checked with dialysis    # Mineral Bone Disorder:   - Secondary renal hyperparathyroidism; PTH level: Minimally elevated ( pg/ml)        On treatment: None  - Vitamin D; level: Not checked recently, but was normal last check        On supplement: No  - Calcium; level: Normal when corrected for low alb       On supplement: No  - Phosphorus; level: High  5.5      On binder: No    # Electrolytes:   - Potassium; level: Normal        On supplement: No  - Magnesium; level: High         On supplement: No, modulate with CRRT.  - Bicarbonate; level: Low 21 (improving)       On supplement: No  - Sodium; level: Normal    # Gout:    - On prednisone 10 mg PO daily PTA. Wean prednisone down to 5mg daily and then will consider cortisol stimulation test    # Transplant History:  Etiology of Kidney Failure: IgA nephropathy and ANCA vasculitis  Tx: Liver Tx (ABIMAEL)  Transplant: 6/6/2023 (Liver),  6/6/2023 (Kidney)  Significant changes in immunosuppression: None  Significant transplant-related complications: None    Recommendations were communicated to the primary team via this note and Epic message.    Seen and discussed with JADE Morrissey CNP   Pager: [phone]      Physician Attestation     I saw and evaluated Damion Quinones as part of a shared APRN/PA visit.     I personally reviewed the vital signs, medications, labs and imaging.    I personally performed the substantive portion of the medical decision making for this visit - please see the GEORGE's documentation for full details.    Key management decisions made by me and carried out under my direction: Continue on CRRT but increase UF to 50cc/hr. Ok to restart midodrine, would restart metoprolol for afib w/RVR. Recheck Hep B DNA and Hep B surface Ag as Hep B surface Ag was positive now but negative 1/2023. Ok to continue rATG 1mg/kg daily for 6mg/kg induction although no data on reducing time with DGF.     I personally performed the substantive portion of the history for this visit - please see the GEORGE's documentation for full details.  Key additional history findings made by me: Pt extubated yesterday, anuric, no flatus yet. Afib w/ RVR     Subhash Dutta MD  Date of Service (when I saw the patient): 06/08/23      Interval History   Mr. Quinones's creatinine is 1.84 (06/08 0415); Trend down on CRRT  I/O last 3 completed shifts:  In: 2795.98 [P.O.:720; I.V.:1401.98; NG/GT:280; IV Piggyback:294]  Out: 1994 [Urine:33; Drains:250; Other:1711]      Other significant labs/tests/vitals:    06/06/23 05:47 06/06/23 21:08 06/07/23 03:30 06/08/23 04:15   ALT 76 (H) 624 (HH) 654 (HH) 692 (HH)    (H) 1,276 (HH) 1,834 (HH) 816 (HH)   Bilirubin Total 0.6 1.7 (H) 1.6 (H) 1.7 (H)     SBP mostly 100s-110s overnight.  Norepinephrine stopped overnight.  Metoprolol tartrate 25 mg PO BID started this morning.  CVP 8 this morning.  Wt down 1.3 kg since  yesterday per bed scale.  However he is still 14 kg up from admission wt.  US of transplanted kidney and liver yesterday show patent vasculature.   No acute events overnight.  No chest pain.  Pt reports some shortness of breath.  Maintaining SpO2 on RA.  2+ leg swelling.  Some nausea, but denies vomiting.  No BM yet since surgery.  Passing gas ok.    Review of Systems   4 point ROS was obtained and negative except as noted in the Interval History.    MEDICATIONS:    aspirin  325 mg Oral Daily     B and C vitamin Complex with folic acid  5 mL Per Feeding Tube Daily     methylPREDNISolone  100 mg Intravenous Once    Followed by     [START ON 2023] methylPREDNISolone  50 mg Intravenous Once    Followed by     [START ON 6/10/2023] predniSONE  25 mg Oral Once    Followed by     [START ON 2023] predniSONE  10 mg Oral Once     metoprolol tartrate  12.5 mg Oral BID     micafungin  100 mg Intravenous Q24H     mycophenolate  750 mg Oral BID IS     pantoprazole  40 mg Per Feeding Tube QAM AC     piperacillin-tazobactam  3.375 g Intravenous Q6H     protein modular  1 packet Per Feeding Tube TID     sodium chloride (PF)  3 mL Intravenous Q8H     sulfamethoxazole-trimethoprim  1 tablet Oral Daily     [START ON 2023] ursodiol  250 mg Oral BID     valGANciclovir  450 mg Oral Daily    Or     valGANciclovir  450 mg Oral or NG Tube Daily       dextrose       dextrose       CRRT replacement solution 12.5 mL/kg/hr (23 0450)     insulin regular 3 Units/hr (23 0500)     NaCl 0.45 % 1,000 mL infusion       - MEDICATION INSTRUCTIONS -       norepinephrine Stopped (23)     CRRT replacement solution 200 mL/hr at 23 0254     CRRT replacement solution 12.5 mL/kg/hr (23 0449)     BETA BLOCKER NOT PRESCRIBED       vasopressin Stopped (23)       Physical Exam   Temp  Av.9  F (36.6  C)  Min: 97  F (36.1  C)  Max: 99.2  F (37.3  C)  Arterial Line BP  Min: 60/45  Max: 131/80  Arterial  "Line MAP (mmHg)  Av mmHg  Min: 52 mmHg  Max: 101 mmHg      Pulse  Av.5  Min: 68  Max: 159 Resp  Av.8  Min: 14  Max: 23  FiO2 (%)  Av.3 %  Min: 40 %  Max: 50 %  SpO2  Av.1 %  Min: 92 %  Max: 100 %    CVP (mmHg): 6 mmHgBP 113/84 (BP Location: Right arm)   Pulse 103   Temp 97.5  F (36.4  C) (Axillary)   Resp 14   Ht 1.702 m (5' 7\")   Wt 116.3 kg (256 lb 6.3 oz)   SpO2 96%   BMI 40.16 kg/m      Admit Weight: 102.3 kg (225 lb 8 oz)     GENERAL APPEARANCE: no distress  RESP: lungs clear to auscultation - no rales, rhonchi or wheezes  CV: regular rhythm, normal rate, no rub, no murmur  EDEMA: no LE edema bilaterally  ABDOMEN: soft, nondistended, nontender, bowel sounds normal  MS: extremities normal - no gross deformities noted, no evidence of inflammation in joints, no muscle tenderness  SKIN: no rash  NEURO: mentation intact  PSYCH: fatigued  DIALYSIS ACCESS:  Left IJ tunneled catheter    Data   All labs reviewed by me.  CMP  Recent Labs   Lab 23  0418 23  0415 23  0202 23  0018 23  1904 23  1647 23  1212 23  1208 23  0337 23  0330 23  2213 23  2108 23  1208 23  0547   NA  --  138  --   --   --  138  --  137  137  --  138   < > 142   < > 138   POTASSIUM  --  4.6  --   --   --  4.7  --  4.5  4.5  --  4.4   < > 5.0   < > 4.2   CHLORIDE  --  106  --   --   --  104  --  107  107  --  105  --  107  --  104   CO2  --  21*  --   --   --  20*  --  18*  18*  --  17*  --  20*  --  22   ANIONGAP  --  11  --   --   --  14  --  12  12  --  16*  --  15  --  12   * 113* 131* 111*   < > 125*   < > 114*  114*   < > 167*   < > 195*   < > 82   BUN  --  28.0*  --   --   --  26.4*  --  26.2*  26.2*  --  32.0*  --  32.6*  --  50.9*   CR  --  1.84*  --   --   --  2.11*  --  2.19*  2.19*  --  2.76*  --  2.76*  --  4.50*   GFRESTIMATED  --  40*  --   --   --  34*  --  33*  33*  --  25*  --  25*  --  14*   NAZARIO  --  " 7.7*  --   --   --  7.8*  --  7.7*  7.7*  --  7.6*  --  8.6*  --  8.8   MAG  --  2.8*  --   --   --  2.1  --  2.0  --  2.1  --   --   --  2.3   PHOS  --  5.5*  --   --   --  5.2*  --  4.8*  --  4.7*  --   --   --  4.6*   PROTTOTAL  --  4.2*  --   --   --   --   --   --   --  3.4*  --  3.4*  --  5.1*   ALBUMIN  --  2.3*  --   --   --  2.3*  --  2.1*  --  2.0*  --  2.1*  --  2.7*   BILITOTAL  --  1.7*  --   --   --   --   --   --   --  1.6*  --  1.7*  --  0.6   ALKPHOS  --  107  --   --   --   --   --   --   --  81  --  99  --  203*   AST  --   --   --   --   --   --   --   --   --  1,834*  --  1,276*  --  157*   ALT  --  692*  --   --   --   --   --   --   --  654*  --  624*  --  76*    < > = values in this interval not displayed.     CBC  Recent Labs   Lab 06/08/23  0013 06/07/23  1647 06/07/23  1208 06/07/23  0758 06/07/23  0330 06/06/23  2315 06/06/23  2108   HGB  --  8.9* 8.9* 7.9* 8.5*   < > 9.1*   WBC  --  16.4* 14.4*  --  18.2*  --  12.3*   RBC  --  2.96* 2.91*  --  2.76*  --  3.02*   HCT  --  27.4* 26.7*  --  26.0*  --  28.1*   MCV  --  93 92  --  94  --  93   MCH  --  30.1 30.6  --  30.8  --  30.1   MCHC  --  32.5 33.3  --  32.7  --  32.4   RDW  --  18.0* 17.9*  --  18.2*  --  17.7*   PLT 68* 76* 81* 73* 130*  --  114*    < > = values in this interval not displayed.     INR  Recent Labs   Lab 06/08/23  0013 06/07/23  2013 06/07/23  1647 06/07/23  1208   INR 1.96* 2.06* 2.18* 2.59*   PTT 40* 36 38 43*     ABG  Recent Labs   Lab 06/07/23  1637 06/07/23  1206 06/07/23  0757 06/07/23  0341   PH 7.43 7.44 7.46* 7.39   PCO2 33* 32* 31* 33*   PO2 71* 129* 127* 171*   HCO3 22 22 22 20*   O2PER 21 40 40 50      Urine Studies  Recent Labs   Lab Test 06/05/23  1620 04/07/23  1246 02/13/23  0743 01/23/23  0828   COLOR Yellow Yellow Yellow Light Yellow   APPEARANCE Clear Clear Clear Clear   URINEGLC Negative Negative Negative Negative   URINEBILI Negative Negative Negative Negative   URINEKETONE Negative Negative  Negative Negative   SG 1.012 1.011 1.014 1.013   UBLD Moderate* Moderate* Negative Negative   URINEPH 5.5 5.5 5.0 6.0   PROTEIN 10* 10* 10* 20*   NITRITE Negative Negative Negative Negative   LEUKEST Negative Negative Negative Negative   RBCU 2 5* 1 <1   WBCU 7* 2 2 8*     No lab results found.  PTH  Recent Labs   Lab Test 05/19/23  0956   PTHI 67*     Iron Studies  Recent Labs   Lab Test 11/22/22  0848   IRON 68   *   IRONSAT 31   HOLA 429*       IMAGING:  All imaging studies reviewed by me.

## 2023-06-08 NOTE — PROGRESS NOTES
06/08/23 0905   Appointment Info   Signing Clinician's Name / Credentials (OT) Edwina Ragland OTR/L   Rehab Comments (OT) abdominal precations       Present no   Living Environment   People in Home spouse   Current Living Arrangements house   Home Accessibility stairs within home   Number of Stairs, Main Entrance other (see comments)  (ramp to enter home)   Number of Stairs, Within Home, Primary eight;other (see comments)  (split level, ~8 stairs to change levels)   Stair Railings, Within Home, Primary railing on right side (ascending)   Transportation Anticipated family or friend will provide   Living Environment Comments Per pt spouse, pt lives in split level house, ramped entrance, ~8 stairs within home, tub/shower unit, access to shower chair.   Self-Care   Usual Activity Tolerance moderate   Current Activity Tolerance poor   Equipment Currently Used at Home shower chair;walker, rolling   Fall history within last six months no   Activity/Exercise/Self-Care Comment Pt reports ADL IND at baseline for ~2 weeks pre transplant, has needed 4WW in past when mobility has been variable; Limited to household and short community distances   Instrumental Activities of Daily Living (IADL)   IADL Comments Per spouse, pt receives assist for most IADL tasks including laundry, cooking, meal prep, etc.   General Information   Onset of Illness/Injury or Date of Surgery 06/05/23   Referring Physician Subhash Singh MD   Patient/Family Therapy Goal Statement (OT) Return home and to PLOF   Additional Occupational Profile Info/Pertinent History of Current Problem Per EMR, Damion Quinones is a 67 year old male with a past medical history significant for decompensated alcohol related + hereditary hemochromatosis (homozygous H63D) cirrhosis complicated by variceal bleeding s/p TIPS (10/1/2022) and hepatic encephalopathy. PMHx also includes coronary artery disease, alcohol overuse, obesity, ESRD on HD M-W-F (IgA  nephropathy + ANCA vasculitis), psoriatic arthritis, paroxysmal atrial fibrillation (on warfarin), DVT, recurrent c diff, and HTN.  He is now s/p  donor liver and kidney transplant on 23 with Dr. Clifton.   Existing Precautions/Restrictions fall;abdominal;lifting   Limitations/Impairments safety/cognitive   Left Upper Extremity (Weight-bearing Status) partial weight-bearing (PWB)  (no > 10 lb)   Right Upper Extremity (Weight-bearing Status) partial weight-bearing (PWB)  (no > 10 lb)   Left Lower Extremity (Weight-bearing Status) full weight-bearing (FWB)   Right Lower Extremity (Weight-bearing Status) full weight-bearing (FWB)   General Observations and Info Activity: Adult ICU Early Mobility Protocol   Cognitive Status Examination   Orientation Status person;place   Cognitive Status Comments Pt presents oriented to self and place however appears slightly confused when answering PLOF and home set-up questions, spouse assists, will monitor   Visual Perception   Visual Impairment/Limitations corrective lenses for reading   Impact of Vision Impairment on Function (Vision) unable to formally assess due to pt cognitive status, will monitor   Sensory   Sensory Quick Adds sensation intact   Pain Assessment   Patient Currently in Pain Yes, see Vital Sign flowsheet   Posture   Posture Comments Pt requiring mod A x 1-2 for unsupported sitting balance at EOB, displays posterior trunk lean   Range of Motion Comprehensive   General Range of Motion bilateral upper extremity ROM WFL   Strength Comprehensive (MMT)   Comment, General Manual Muscle Testing (MMT) Assessment B UE strength grossly deconditioned, formal MMT not assessed due to post-surgical precautions   Muscle Tone Assessment   Muscle Tone Quick Adds No deficits were identified   Coordination   Upper Extremity Coordination No deficits were identified   Bed Mobility   Bed Mobility supine-sit   Supine-Sit [place] (Bed Mobility) moderate assist (50%  patient effort);maximum assist (25% patient effort);2 person assist;verbal cues   Comment (Bed Mobility) per clinical judgement   Transfers   Transfers sit-stand transfer;toilet transfer   Sit-Stand Transfer   Sit-Stand [place] (Transfers) moderate assist (50% patient effort);2 person assist;verbal cues   Sit/Stand Transfer Comments per clinical judgement   Toilet Transfer   Type (Toilet Transfer) sit-stand   [place] Level (Toilet Transfer) maximum assist (25% patient effort);2 person assist   Toilet Transfer Comments per clinical judgement   Balance   Balance Assessment sitting balance: static   Balance Comments per clinical judgement, mod A for unsupported sitting balance   Bathing Assessment/Intervention   Comment, (Bathing) per clinical judgement   [place] Level (Bathing) maximum assist (25% patient effort);verbal cues   Assistive Devices (Bathing) shower chair   Upper Body Dressing Assessment/Training   Comment, (Upper Body Dressing) per clinical judgement   [place] Level (Upper Body Dressing) minimum assist (75% patient effort);set up   Lower Body Dressing Assessment/Training   Comment, (Lower Body Dressing) per clinical judgement   [place] Level (Lower Body Dressing) dependent (less than 25% patient effort);verbal cues   Toileting   Comment, (Toileting) per clinical judgement   [place] Level (Toileting) dependent (less than 25% patient effort)   Clinical Impression   Criteria for Skilled Therapeutic Interventions Met (OT) Yes, treatment indicated   OT Diagnosis Decreased ADL IND/safety, fucntional mobility, overall activity tolerance, cognition   OT Problem List-Impairments impacting ADL problems related to;activity tolerance impaired;balance;cognition;mobility;range of motion (ROM);strength;pain;post-surgical precautions;postural control   Assessment of Occupational Performance 5 or more Performance Deficits   Identified Performance Deficits Dressing, bathing, toileting,  standing g/h tasks, functional mobility, household management, meal prep, muscular strength/endurance, cognition   Planned Therapy Interventions (OT) ADL retraining;IADL retraining;balance training;cognition;ROM;strengthening;transfer training;progressive activity/exercise   Clinical Decision Making Complexity (OT) low complexity   Anticipated Equipment Needs Upon Discharge (OT) other (see comments)  (TBD)   Risk & Benefits of therapy have been explained evaluation/treatment results reviewed;care plan/treatment goals reviewed;participants included;patient;spouse/significant other   Clinical Impression Comments Pt below baseline function and will benefit from continued skilled OT during IP stay to progress IND/safety and return to PLOF   OT Total Evaluation Time   OT Eval, Low Complexity Minutes (01833) 5   OT Goals   Therapy Frequency (OT) 5 times/wk   OT Predicted Duration/Target Date for Goal Attainment 07/03/23   OT Goals Upper Body Dressing;Lower Body Dressing;Lower Body Bathing;Toilet Transfer/Toileting;Cognition;OT Goal 1   OT: Upper Body Dressing Modified independent;within precautions   OT: Lower Body Dressing Supervision/stand-by assist;within precautions;including set-up/clothing retrieval   OT: Upper Body Bathing Modified independent;using adaptive equipment;within precautions   OT: Lower Body Bathing Modified independent;using adaptive equipment;with precautions   OT: Toilet Transfer/Toileting Modified independent;toilet transfer;cleaning and garment management;within precautions   OT: Cognitive Patient/caregiver will verbalize understanding of cognitive assessment results/recommendations as needed for safe discharge planning   OT: Goal 1 Pt will be able to demonstrate tub/shower transfer with SBA and within precautions prior to discharge home   OT Discharge Planning   OT Plan OT: review precautions, monitor cognition, unsupported sitting at EOB, seated ADLs, LB dressing   OT Discharge Recommendation (DC  Rec) Transitional Care Facility   OT Rationale for DC Rec Pt significantly below baseline for ADL tasks, mobility, overall activity tolerance. Limited by fatigue. Would recommend continued rehab at TCU to progress IND/safety and return to PLOF. Pending on IP progress, may be able to progress to home with assist.   OT Brief overview of current status OH lift bed>chair, max A LB dressing   Total Session Time   Total Session Time (sum of timed and untimed services) 5

## 2023-06-08 NOTE — PHARMACY-TRANSPLANT NOTE
Adult Liver/Kidney Transplant Post Operative Note  67 year old male s/p  donor liver/kidney transplant on 23 for End stage liver disease and end stage renal disease.  Planned immunosuppression regimen to include:  INDUCTION with: thymoglobulin 1mg/kg daily x 6 doses (total of 6 mg/kg per transplant team) and methylprednisolone/prednisone taper per liver/kidney transplant protocol.    MAINTENANCE immunosuppression to include mycophenolate and tacrolimus with initial goal trough levels of 8-10 mcg/L for 0-3 months post-transplant (held by team until thymoglobulin induction is complete due to donor having ATN and patient having slow renal recovery with low UOP post-op). Patient may also be continued on prednisone at a dose of 5 mg PO daily until tacrolimus level is therapeutic.    Surgical prophylaxis includes: piperacillin-tazobactam IV for 48 hours and micafungin IV for 14 days.    Opportunistic pathogen prophylaxis includes: trimethoprim/sulfamethoxazole, valganciclovir and nystatin (topical antifungal coverage to begin once systemic antifungal therapy is complete).  Patient is not enrolled in medication study.  Pharmacy will monitor for medication interactions and immunosuppression levels in conjunction with the team. Medication therapy needs for discharge planning will continue to be addressed throughout the current admission via multidisciplinary rounds and order review.  Pharmacy will make recommendations as appropriate.      Shahana EspinoD  Pharmacy Resident

## 2023-06-08 NOTE — PLAN OF CARE
Major Shift Events:   Neuro: A&Ox4, forgetful, moderate strength in all ext, CMS at baseline  Cardiac: a fib, -120s primarily, CVP 6-8, pulses palpable, afebrile  Resp: lung sounds dim, sating > 92% RA, IS at bedside   GI: gas discomfort, bowel sounds faint, LBM PTA  : stauffer intact, 10cc icteric urine, CRRT I=O per flowsheets   Skin: scattered skin tears, bruising, & petechiae; clamshell incision dsg reinforced d/t oozing, MAE leaking at site, Prevena wound vac to kidney incision, umbilical bulge present   Pain/Nausea: abdominal pain treated with oxy; int nausea treated with zofran  LDA: R PIV infusing insulin gtt (alg 3), MAC infusing abx/lytes replacement; L HD CVC; NJT with TF advanced to 20ml/hr (goal 40ml/hr) & St [facility], L radial art line  Diet: clears  Mobility: attempt up to BSC with 2 assist but pt too weak to tolerate, lift  Labs: reviewed, Ca gluconate given, LA trending down  Plan: continue POC   For vital signs and complete assessments, please see documentation flowsheets.

## 2023-06-08 NOTE — PROGRESS NOTES
DISCHARGE  Reason for Discharge: Therapist had a discussion with patient and spouse about progress toward goals and plan of care. He had initially been doing very well and making good progress, but then he was hospitalized for some time and regressed. They said they would think about whether they wanted to continue or not and let therapist know. They did not schedule further appointments, and per medical record is now seeking care for organ transplant.    Equipment Issued: NA    Discharge Plan: Other services: organ transplant .    Referring Provider:  Jose Poole           05/09/23 0800   Appointment Info   Signing clinician's name / credentials Syd Grove, PT   Visits Used 6/10   Medical Diagnosis Alcoholic cirrhosis (H)   PT Tx Diagnosis Impaired functional mobility, balance, gait, and endurance   Progress Note/Certification   Start of Care Date 02/28/23   Onset of illness/injury or Date of Surgery 09/01/22   Therapy Frequency 1 time/week   Predicted Duration 10 weeks   Certification date from 02/28/23   Certification date to 05/09/23   Progress Note Due Date 05/30/23   Progress Note Completed Date 05/02/23   PT Goal 1   Goal Identifier LEFS - Short Term   Goal Description Casey will demonstrate improved tolerance as evidenced by an improved LEFS score of at least 25/80.   Goal Progress 59/80   Target Date 03/28/23   Date Met 03/28/23   PT Goal 2   Goal Identifier LEFS - Long Term   Goal Description Casey will demonstrate improved tolerance as evidenced by an improved LEFS score of at least 50/80.   Goal Progress 59/80   Target Date 05/09/23   Date Met 03/28/23   PT Goal 3   Goal Identifier 30 Second Sit-to-stand   Goal Description Casey will demonstrate increased tolerance to functional activity and improved lower extremity strength as evidenced by an improved 30 second sit-to-stand score of at least 16 reps.   Goal Progress 15 reps   Target Date 05/09/23   PT Goal 4   Goal Identifier 6 MWT - Short Term   Goal  Description Casey will demonstrate improved functional capacity and increased tolerance to functional mobility as evidenced by an improved 6 MWT distance of at least 325 meters.   Goal Progress 391.0 meters   Target Date 03/28/23   Date Met 03/28/23   PT Goal 5   Goal Identifier 6 MWT - Long Term   Goal Description Casey will demonstrate improved functional capacity and increased tolerance to functional mobility as evidenced by an improved 6 MWT distance of at least 450 meters.   Goal Progress 391.0 meters   Target Date 05/09/23   PT Goal 6   Goal Identifier FGA   Goal Description Casey will demonstrate improved functional balance and safety with functional gait as evidenced by an improved score on the FGA of at least 23/30.   Goal Progress 24/30   Target Date 05/09/23   Date Met 03/28/23   Subjective Report   Subjective Report Casey reports that he is feeling better. He is still waiting to hear about his transplant status.   Objective Measures   Objective Measures Objective Measure 1;Objective Measure 2;Objective Measure 3   Objective Measure 1   Objective Measure 30 Second Sit-to-stand   Details 10 reps   Objective Measure 2   Objective Measure 6 MWT   Details 164.3 meters (stopped with 2:25 remaining)   Objective Measure 3   Objective Measure Worst Pain   Details 3/10 (right arm/hand)   Objective Measure 4   Objective Measure Upper Extremity Strength   Details 3/28/23: Elbow flexion/extension: 5/5 on left; not tested on right.   Objective Measure 5   Objective Measure Lower Extremity Strength   Details Bilateral hip flexion: 5/5   Objective Measure 6   Objective Measure FGA   Details 18/20   Therapeutic Procedure/Exercise   Therapeutic Procedures: strength, endurance, ROM, flexibillity minutes (82278) 8   Skilled Intervention General strength and conditioning.   Patient Response/Progress Casey tolerated exercise on the recumbent bike well, reporting that it felt good to get moving more again.   Self Care/home Management  "  Skilled Intervention Extensive discussion regarding current symptoms, updates in health status, and plan of care moving forward.   Patient Response/Progress Casey and his wife verbalized understanding of education/discussion.   Treatment Detail Extensive discussion regarding the following: patient and wife updated PT on health status updates since last visit (i.e., went to ER and was hospitalized three times in last month due to altered mental status and other concerns related to his kidney/liver failure; persistent upper extremity swelling that his primary care provider is unsure how to address and told them to follow-up with his nephrologist; decreasing strength/endurance due to decreased activity and other complications; improved but remaining DVTs upper and lower extremity DVTs; etcetera); possible visit with lymphedema therapist regarding swelling; patient and wife reported one doctor during most recent hospitalization told Casey to be careful with walking and other activity given his DVTs (of note imaging showed improvement in the DVTs since last imaging was performed), and PT educated them on basic DVT precautions; balance between staying active but not overstressing his body, especially with multiple DVTs; plan of care (i.e., continue with PT or take a break while he tries to get other health conditions better controlled and to lessen \"appointment fatigue\"); etcetera.   Physical Performance Test/measures   Physical Performance Test/Measurement, Minutes (44538) 24   Skilled Intervention 30 Second Sit-to-stand. FGA. 6 Minute Walk Test.   Patient Response/Progress Casey's gait started to deteriorate as he fatigued, but he did not lose his balance at all. CGA and a gait belt were utilized during FGA and 6 MWT.   Physical Performance Test/Measurement Details See objective measures.   Progress Subjective report.   Plan   Homework Continue walking/staying active as tolerated.   Home program See PTRX   Plan for next " session Continue to progress general strength and conditioning exercises. Continue to monitor swelling. Begin balance training.   Comments   Comments Assessment: Casey returns to PT today reporting feeling significantly better, and he does demonstrate improved functional mobility and ease with transfers. He walked 164.3 meters without assistive device (stopping with 2:25 remaining due to fatigue), indicating decreased functional capacity and tolerance to functional mobility since last measurment. His 30 second sit-to-stand score decreased to 10 reps, indicating decreased lower extremity strength and tolerance to functional acitivty. His FGA score of 18/30 represents a decrease in functional balance and mobility and increased fall risk. These decreases are explained by several exacerbations of his health conditions over the last month or so as explained in previous PT note. He said that he and his wife are still unsure whether he is going to continue with physical therapy at this time, but they said they would let PT know soon.   Medicare Claim Information   Medical Diagnosis Alcoholic cirrhosis (H)   PT Diagnosis Impaired functional mobility, balance, gait, and endurance   Start of Care Date 02/28/23   Onset date of current episode/exacerbation 09/01/22   Ortho Goal 1   Goal Identifier LEFS - Short Term   Goal Description Casey will demonstrate improved tolerance as evidenced by an improved LEFS score of at least 25/80.   Goal Progress 59/80   Target Date 03/28/23   Date Met 03/28/23   Ortho Goal 2   Goal Identifier LEFS - Long Term   Goal Description Casey will demonstrate improved tolerance as evidenced by an improved LEFS score of at least 50/80.   Goal Progress 59/80   Target Date 05/09/23   Date Met 03/28/23   Ortho Goal 3   Goal Identifier 30 Second Sit-to-stand   Goal Description Casey will demonstrate increased tolerance to functional activity and improved lower extremity strength as evidenced by an improved 30  second sit-to-stand score of at least 16 reps.   Goal Progress 15 reps   Target Date 05/09/23   Ortho Goal 4   Goal Identifier 6 MWT - Short Term   Goal Description Casey will demonstrate improved functional capacity and increased tolerance to functional mobility as evidenced by an improved 6 MWT distance of at least 325 meters.   Goal Progress 391.0 meters   Target Date 03/28/23   Date Met 03/28/23   Ortho Goal 5   Goal Identifier 6 MWT - Long Term   Goal Description Casey will demonstrate improved functional capacity and increased tolerance to functional mobility as evidenced by an improved 6 MWT distance of at least 450 meters.   Goal Progress 164.3 meters (stopped with 2:25 remaining)   Target Date 05/09/23   Ortho Goal 6   Goal Identifier FGA   Goal Description Casey will demonstrate improved functional balance and safety with functional gait as evidenced by an improved score on the FGA of at least 23/30.   Goal Progress 24/30   Target Date 05/09/23   Date Met 03/28/23

## 2023-06-08 NOTE — ANESTHESIA POSTPROCEDURE EVALUATION
Patient: Damion Quinones    Procedure: Procedure(s):  Return liver transplant. Intra-operative ultrasound       Anesthesia Type:  General    Note:  Disposition: ICU            ICU Sign Out: Unable to perform physician to physician sign out   Postop Pain Control: Uneventful            Sign Out: Well controlled pain   PONV: No   Neuro/Psych: Uneventful            Sign Out: Acceptable/Baseline neuro status   Airway/Respiratory: Uneventful            Sign Out: Acceptable/Baseline resp. status; AIRWAY IN SITU/Resp. Support               Airway in situ/Resp. Support: ETT                 Reason: Planned Pre-op   CV/Hemodynamics: Uneventful            Sign Out: Acceptable CV status; No obvious hypovolemia; No obvious fluid overload   Other NRE: NONE   DID A NON-ROUTINE EVENT OCCUR? No           Last vitals:  Vitals:    06/08/23 1300 06/08/23 1400 06/08/23 1500   BP:      Pulse: 106 95 110   Resp: 15 26 16   Temp:      SpO2: 95% 93% 96%       Electronically Signed By: Jeremy Rankin MD  June 8, 2023  4:28 PM

## 2023-06-08 NOTE — PLAN OF CARE
Major Shift Events:   Neuro- Alert and oriented X 4, forgetful. Moves all extremities, +4-5 strength.   CV- A fib with PVCs, -130's.  CVP 12.  Pulses palpable. Afebrile. Orthostatic hypotension noted when standing with therapy.     Resp- Lung sounds clear, diminished. Encouraged IS. Patient on room air.   GI- Clear liquid diet. Nausea in AM, resolved after BM X 1. TF advanced to 30 mL/hr. Abdomen protruding, round. Bowel sounds faint.   - Lux to remain in until POD 5 per  Transplant team. Oliguric. Flushed Lux and bladder scanned patient to ensure patency. CRRT goal net negative 0- 50 mL/ hr, met.   Wife updated at bedside.   Plan: Continue plan of care per orders.    For vital signs and complete assessments, please see documentation flowsheets.

## 2023-06-08 NOTE — PROGRESS NOTES
CRRT STATUS NOTE    DATA:  Time:  6:33 AM  Pressures WNL:  YES  Filter Status: WDL    Problems Reported/Alarms Noted:  none    Supplies Present: YES    ASSESSMENT:  Patient Net Fluid Balance:  Net +3420 6/7; net -190 since midnight; net +9800 since admission  Vital Signs:  Vitals reviewed.  Pressors off.  A-fib with rate control 100-110.  O2 sats >93% on room air  Labs: Labs reviewed.  K 4.6. t bili 1.7. platelet 60, INR 1.98  Goals of Therapy: 0-50 ml/hr as tolerated to remain off pressors with MAP >65    INTERVENTIONS:   none    PLAN:  Continue fluid removal as tolerated per goals of therapy. Restart every 72 hours and PRN.  Please notify CRRT resource RN at [phone] with questions and concerns

## 2023-06-08 NOTE — PROGRESS NOTES
CRRT STATUS NOTE    DATA:  Time:  5:42 PM  Pressures WNL:  YES  Filter Status:  WDL    Problems Reported/Alarms Noted:  None    Supplies Present:  YES    ASSESSMENT:  Patient Net Fluid Balance:  Net negative ~800mL since midnight  Vital Signs:  B/P: 113/84, T: 97.5, P: 103, R: 11  Goals of Therapy:  Goal net negative 0-50ml/hr with MAP >65 off pressors.    INTERVENTIONS:   None    PLAN:  Continue CRRT. Contact CRRT RN with concerns.

## 2023-06-08 NOTE — ADDENDUM NOTE
Encounter addended by: Syd Grove, PT on: 6/8/2023 8:24 AM   Actions taken: Episode resolved, Clinical Note Signed, Flowsheet accepted

## 2023-06-08 NOTE — PROGRESS NOTES
Immunosuppression Management Note:    Damion Quinones is a 67 year old male who is seen today  for immunosuppression management     I, Chad Clifton MD, I have examined the patient with our GEORGE/Fellow as part of a shared visit.   I participated in the rounds,  discussed and agree with the note and findings and  reviewed today's vital signs, medications, labs and imaging as noted in this note.  I  reviewed the  immunosuppression medications.  I personally provided a substantive portion of the care of this patient. I personally performed the immunosuppressive management of this patient, reviewed the overall  immunosuppression including drug levels, allograft function and provided the recommendations to adjust the dose to provide optimal levels to prevent rejection of the allograft and prevent toxicity to the organs. This was complex care due to the fresh allograft.   Time spent: evaluating patient, examining patient, discussion of plan, counseling and documentation: >35 min   I spoke to the patient/family and explained below clinical details and answered all the questions    Transplant Surgery  Inpatient Daily Progress Note  06/08/2023    Assessment & Plan:   67 year old male with PMHx of decompensated alcohol + hemochromatosis (homozygous H63D) cirrhosis complicated by variceal bleeding s/p TIPS (10/1/2022) and hepatic encephalopathy + CKD (IgA nephropathy + ANCA vasculitis) s/p simultaneous liver kidney transplant on 6/6/2023. RTOR on 6/6/203 with concern for low flow in the renal graft - ex-lap, washout, closure with healthy appearing graft with intact arterial and venous flow.     s/p DBD simultaneous liver kidney transplant on 6/6/2023 with complex reconstruction of accessory right hepatic artery. POD #2  * RTOR on 6/6/203 with concern for low flow in the renal graft - ex-lap, washout, closure with healthy appearing graft with intact arterial and venous flow.     Liver studies with improving AST and ALT -  down to 816 and 692 respectively. Total bilirubin remains at 1.7, with normal alkaline phosphatase. Lactate normal - 1.5. Liver US w/doppler 2023 - low waveforms and low resistive indices, but stable velocities and upstrokes.   Stent: No biliary stent was placed  Drains: 1 MAE drain in place + prevena wound vac  - ASA 325mg daily   - Ursodiol 250mg BID    Immunosuppression  - Thymoglobulin: s/p 1mg/kg on ,  and . Plan for 1mg/kg daily for a total of 6mg/kg.  - Steroid taper per protocol  - Hold tacrolimus given donor had ATN and patient has slow renal recovery with low UOP post operatively  - MMF 750mg BID    H/o CKD related IgA nephropathy and ANCA vasculitis S/p  donor renal transplant on 2023 with ureteral/J stent placement  Renal US 2023: patent renal transplant vasculature. Nephrology following.  - Midodrine 2.5mg q8h restarted  - Continue CRRT with goal removal: 50cc/hr  : Lux to remain in place at this time for close urinary output monitoring    Hematology:   Leukocytosis: Ongoing leukocytosis with neurophilic predominance, downtrending. Afebrile. No symptoms or signs of infection.  Acute on chronic anemia: Hgb 7-9 in the setting of post-operative blood loss. No overt s/sx of blood loss.   Thrombocytopenia: Platelet count 60.  Coagulopathy: INR 1.98    Cardiac:   Coronary artery disease: Continue ASA 81mg daily.    * Recommend statin resumption in the outpatient setting.     Paroxysmal atrial fibrillation:    * Restart metoprolol 25mg BID given afib with RVR on 6/8 AM   * When INR normalizes, will plan to start heparin gtt     Hypertension prior to admission: No ongoing vasopressor support.     Respiratory:  Acute hypoxic respiratory failure in the setting of post-operative state: Extubated on  to supplemental O2, now breathing comfortably on ambient air.    * Continue pulmonary toilet and incentive spirometry    GI/Nutrition:   Hepatitis B s Ag +: Noted to be positive  pre-transplant on 6/5/23, but not previously positive (1/26/2023). HBV DNA negative on 6/5/23. No clear at risk behavior.    * Follow up repeat: Hepatitis B s Ag and hepatitis B DNA    Mild protein calorie malnutrition: Naso-jejunal tube in place, enteral feeding per nutrition.    * Clear liquid diet, advance diet as tolerated     Bowel regimen: Miralax 17g daily + senna docuate BID    Endocrine:   Post-operative elevated blood glucoses: HgA1c 6.1% (6/6/2023)  - Insulin gtt    Rheumatology:  Psoriatic arthritis: Prior to admission was on prednisone 10mg daily, on prednisone taper as above.   - Goal to discharge on prednisone 5mg; may consider cortisol stimulation test given long term steroid use    Neurologic  Acute post operative pain:    * Methocarbamol 500mg QID    * Oxycodone 2.5 to 5mg q4h PRN    Deconditioning/Weakness: OT and PT optimization     Alcohol use disorder: Continue sober supports post transplant. Continue complete sobriety.    Prophylaxis: Mechanical DVT ppx , fall, GI (PPI BID), fungal (micafungin), CMV (valganciclovir, CMV IgG Ab +), PJP (bactrim), zosyn x 48 hours.     Disposition: Ongoing MICU cares given CRRT    GEORGE/Fellow/Resident Provider: Llois Bermudez MD     Faculty: Dr. Clifton    __________________________________________________________________  Transplant History:  Transplants:6/6/2023 (Liver), 6/6/2023 (Kidney)   Postoperative day: 2 (Liver), 2 (Kidney)     Interval History:   Overnight events: Atrial fibrillation with RVR, required metoprolol  - Breathing comfortably on ambient air  - Ongoing CRRT  - No bowel movements since prior to admission  - Enteral feeding via NJ tube; interested in trying a diet. Mild nausea managed with zofran.     ROS:   A 10-point review of systems was negative except as noted above.    Curent Meds:   acetaminophen  650 mg Oral Once    anti-thymocyte globulin  100 mg Intravenous Central line Once    aspirin  325 mg Oral Daily    B and C  "vitamin Complex with folic acid  5 mL Per Feeding Tube Daily    diphenhydrAMINE  25-50 mg Oral Once    Or    diphenhydrAMINE  25-50 mg Per NG tube Once    methocarbamol  500 mg Oral 4x Daily    [START ON 6/9/2023] methylPREDNISolone  50 mg Intravenous Once    Followed by    [START ON 6/10/2023] predniSONE  25 mg Oral Once    Followed by    [START ON 6/11/2023] predniSONE  10 mg Oral Once    metoprolol tartrate  25 mg Oral BID    micafungin  100 mg Intravenous Q24H    mycophenolate  750 mg Oral BID IS    pantoprazole  40 mg Per Feeding Tube QAM AC    piperacillin-tazobactam  3.375 g Intravenous Q6H    polyethylene glycol  17 g Oral Daily    protein modular  1 packet Per Feeding Tube TID    sennosides  8.6 mg Oral BID    sodium chloride (PF)  3 mL Intravenous Q8H    sulfamethoxazole-trimethoprim  1 tablet Oral Daily    [START ON 6/9/2023] ursodiol  250 mg Oral BID    valGANciclovir  450 mg Oral Daily    Or    valGANciclovir  450 mg Oral or NG Tube Daily       Physical Exam:     Admit Weight: 102.3 kg (225 lb 8 oz)    Current Vitals:   /84 (BP Location: Right arm)   Pulse 105   Temp 97.9  F (36.6  C) (Axillary)   Resp 15   Ht 1.702 m (5' 7\")   Wt 116.3 kg (256 lb 6.3 oz)   SpO2 92%   BMI 40.16 kg/m      CVP (mmHg): 11 mmHg    Vital sign ranges:    Temp:  [97.5  F (36.4  C)-98.6  F (37  C)] 97.9  F (36.6  C)  Pulse:  [] 105  Resp:  [12-20] 15  BP: ()/(45-84) 113/84  MAP:  [64 mmHg-101 mmHg] 75 mmHg  Arterial Line BP: ()/(54-81) 95/67  FiO2 (%):  [40 %] 40 %  SpO2:  [92 %-100 %] 92 %  Patient Vitals for the past 24 hrs:   BP Temp Temp src Pulse Resp SpO2 Weight   06/08/23 1000 -- -- -- 105 15 92 % --   06/08/23 0900 -- -- -- 94 12 95 % --   06/08/23 0800 -- 97.9  F (36.6  C) Axillary 113 16 94 % --   06/08/23 0700 -- -- -- (!) 125 15 93 % --   06/08/23 0600 -- -- -- 108 -- 95 % --   06/08/23 0500 -- -- -- 107 -- 94 % --   06/08/23 0400 -- 97.5  F (36.4  C) Axillary 103 14 96 % 116.3 kg (256 " lb 6.3 oz)   06/08/23 0300 -- -- -- 113 -- 97 % --   06/08/23 0200 -- -- -- 99 -- 97 % --   06/08/23 0100 -- -- -- 116 -- 97 % --   06/08/23 0000 -- 97.6  F (36.4  C) Axillary 95 20 99 % --   06/07/23 2300 -- -- -- 112 -- 99 % --   06/07/23 2200 -- -- -- 110 -- 99 % --   06/07/23 2100 -- -- -- 100 -- 100 % --   06/07/23 2040 113/84 -- -- 96 -- 98 % --   06/07/23 2000 -- 98.6  F (37  C) Axillary 99 20 99 % --   06/07/23 1945 -- -- -- 104 -- 99 % --   06/07/23 1930 -- -- -- 93 -- 99 % --   06/07/23 1915 -- -- -- 89 -- 96 % --   06/07/23 1900 -- -- -- 100 -- 92 % --   06/07/23 1845 -- -- -- 95 -- 96 % --   06/07/23 1830 -- -- -- 101 -- 96 % --   06/07/23 1815 -- -- -- 117 -- 95 % --   06/07/23 1800 -- -- -- (!) 125 -- 96 % --   06/07/23 1745 -- -- -- (!) 126 -- 93 % --   06/07/23 1730 -- -- -- (!) 124 -- 96 % --   06/07/23 1715 -- -- -- 120 -- 96 % --   06/07/23 1700 -- -- -- (!) 131 -- 98 % --   06/07/23 1645 -- -- -- (!) 125 -- 97 % --   06/07/23 1630 -- -- -- 111 -- 97 % --   06/07/23 1615 93/45 -- -- (!) 130 -- 96 % --   06/07/23 1600 -- 98.2  F (36.8  C) Oral (!) 122 18 95 % --   06/07/23 1545 -- -- -- (!) 159 -- 97 % --   06/07/23 1530 -- -- -- (!) 121 -- 97 % --   06/07/23 1515 -- -- -- (!) 123 -- 98 % --   06/07/23 1500 -- -- -- (!) 139 -- 99 % --   06/07/23 1445 -- -- -- 113 -- 98 % --   06/07/23 1430 -- -- -- (!) 122 -- 99 % --   06/07/23 1415 -- -- -- 118 -- 98 % --   06/07/23 1406 -- -- -- -- -- 98 % --   06/07/23 1405 -- -- -- -- -- 99 % --   06/07/23 1400 -- -- -- 114 -- 99 % --   06/07/23 1330 -- -- -- (!) 125 -- 99 % --   06/07/23 1315 -- -- -- 118 -- 99 % --   06/07/23 1300 -- -- -- 113 -- 98 % --   06/07/23 1245 -- -- -- (!) 125 -- 100 % --   06/07/23 1230 -- -- -- 116 -- 100 % --   06/07/23 1215 -- -- -- 120 -- 99 % --   06/07/23 1200 -- 98  F (36.7  C) Oral 114 16 99 % --   06/07/23 1145 -- -- -- 120 -- 99 % --   06/07/23 1130 -- -- -- 116 -- 99 % --   06/07/23 1119 102/65 -- -- 106 -- 100 % --  "  06/07/23 1115 -- -- -- 111 -- 99 % --   06/07/23 1100 -- -- -- 92 -- 99 % --   06/07/23 1045 -- -- -- 89 -- 100 % --   06/07/23 1030 -- -- -- 95 -- 100 % --     VS:  /84 (BP Location: Right arm)   Pulse 105   Temp 97.9  F (36.6  C) (Axillary)   Resp 15   Ht 1.702 m (5' 7\")   Wt 116.3 kg (256 lb 6.3 oz)   SpO2 92%   BMI 40.16 kg/m      Wt:   Wt Readings from Last 2 Encounters:   06/08/23 116.3 kg (256 lb 6.3 oz)   05/19/23 103.4 kg (228 lb)      Constitutional: Alert, awake  Eyes: Conjunctiva anicteric  CV: NSR, no murmurs  Respiratory: breathing comfortably on ambient air  Abd: Obese. Surgical scar covered with prevena. + MAE drain w/serosang drainage.   Skin: No jaundice  MSK: 2+ Mima edema bilaterally  Neuro: Alert and oriented x 3  - HD access: RIJ tunneled catehter    Data:   CMP  Recent Labs   Lab 06/08/23  1011 06/08/23  0820 06/08/23  0418 06/08/23  0415 06/07/23  1904 06/07/23  1647 06/07/23  1641 06/07/23  1637 06/07/23  0337 06/07/23  0330 06/06/23  0547 06/05/23  1605   NA  --   --   --  138  --  138  --   --    < > 138   < > 139   POTASSIUM  --   --   --  4.6  --  4.7  --   --    < > 4.4   < > 4.1   CHLORIDE  --   --   --  106  --  104  --   --    < > 105   < > 105   CO2  --   --   --  21*  --  20*  --   --    < > 17*   < > 21*   * 106*   < > 113*   < > 125*   < >  --    < > 167*   < > 101*   BUN  --   --   --  28.0*  --  26.4*  --   --    < > 32.0*   < > 36.9*   CR  --   --   --  1.84*  --  2.11*  --   --    < > 2.76*   < > 3.85*   GFRESTIMATED  --   --   --  40*  --  34*  --   --    < > 25*   < > 17*   NAZARIO  --   --   --  7.7*  --  7.8*  --   --    < > 7.6*   < > 8.7*   ICAW  --   --   --  4.3*  --   --   --  4.3*   < >  --    < >  --    MAG  --   --   --  2.8*  --  2.1  --   --    < > 2.1   < > 2.2   PHOS  --   --   --  5.5*  --  5.2*  --   --    < > 4.7*   < > 3.4   AMYLASE  --   --   --   --   --   --   --   --   --  90  --  153*   LIPASE  --   --   --   --   --   --   --   --   " --  59  --   --    ALBUMIN  --   --   --  2.3*  --  2.3*  --   --    < > 2.0*   < > 2.9*   BILITOTAL  --   --   --  1.7*  --   --   --   --   --  1.6*   < > 0.6   ALKPHOS  --   --   --  107  --   --   --   --   --  81   < > 207*   AST  --   --   --  816*  --   --   --   --   --  1,834*   < >  --    ALT  --   --   --  692*  --   --   --   --   --  654*   < > 84*    < > = values in this interval not displayed.     CBC  Recent Labs   Lab 06/08/23 0415 06/08/23  0013 06/07/23  1647 06/06/23  2315 06/06/23  2108   HGB 8.5*  --  8.9*   < > 9.1*   WBC 13.6*  --  16.4*   < > 12.3*   PLT 60* 68* 76*   < > 114*   A1C  --   --   --   --  5.1    < > = values in this interval not displayed.     COAGS  Recent Labs   Lab 06/08/23 0415 06/08/23  0013   INR 1.98* 1.96*   PTT 38 40*

## 2023-06-08 NOTE — PROGRESS NOTES
06/08/23 0900   Appointment Info   Signing Clinician's Name / Credentials (PT) lise harding, pt   Living Environment   People in Home spouse   Current Living Arrangements house   Home Accessibility stairs within home   Number of Stairs, Main Entrance   (has ramp to enter home)   Number of Stairs, Within Home, Primary eight  (split level, 8 steps to change levels)   Stair Railings, Within Home, Primary railing on right side (ascending)   Transportation Anticipated family or friend will provide   Living Environment Comments spouse can assist at home   Self-Care   Usual Activity Tolerance moderate   Current Activity Tolerance poor   Regular Exercise No   Equipment Currently Used at Home walker, rolling  (has 4WW)   Fall history within last six months no   Activity/Exercise/Self-Care Comment IND with functional mobility and ADLs during 2 weeks pre transplant, has needed 4WW in past as mobility had been variable.  limited to household and short community distances   General Information   Onset of Illness/Injury or Date of Surgery 06/05/23   Referring Physician Subhash Singh MD   Patient/Family Therapy Goals Statement (PT) return home   Pertinent History of Current Problem (include personal factors and/or comorbidities that impact the POC) 67 year old male with PMHx of decompensated alcohol + hemochromatosis (homozygous H63D) cirrhosis complicated by variceal bleeding s/p TIPS (10/1/2022) and hepatic encephalopathy + CKD (IgA nephropathy + ANCA vasculitis) s/p simultaneous liver kidney transplant on 6/6/2023. RTOR on 6/6/203 with concern for low flow in the renal graft - ex-lap, washout, closure with healthy appearing graft with intact arterial and venous flow.   Existing Precautions/Restrictions abdominal   Cognition   Affect/Mental Status (Cognition) WFL   Orientation Status (Cognition) oriented x 4   Follows Commands (Cognition) WFL   Integumentary/Edema   Integumentary/Edema Comments edema noted B LE   Posture     Posture Forward head position   Range of Motion (ROM)   ROM Comment B LE grossly WFL   Strength (Manual Muscle Testing)   Strength Comments B LE grossly 5/5   Bed Mobility   Comment, (Bed Mobility) roll L <> R max assist   Balance   Balance Comments needed min/mod assist to maintain sitting balance at edge of chair   Sensory Examination   Sensory Perception Comments light touch/proprioception intact B LE   Muscle Tone   Muscle Tone no deficits were identified   Clinical Impression   Criteria for Skilled Therapeutic Intervention Yes, treatment indicated   PT Diagnosis (PT) impaired functional mobility s/p liver transplant   Influenced by the following impairments pain, impaired balance, decreased activity tolerance   Functional limitations due to impairments impaired bed mobility, impaired transfers, impaired gait   Clinical Presentation (PT Evaluation Complexity) Evolving/Changing   Clinical Presentation Rationale clinical judgement   Clinical Decision Making (Complexity) low complexity   Planned Therapy Interventions (PT) balance training;bed mobility training;gait training;patient/family education;stair training;transfer training;progressive activity/exercise   Risk & Benefits of therapy have been explained evaluation/treatment results reviewed;care plan/treatment goals reviewed   PT Total Evaluation Time   PT Eval, Low Complexity Minutes (39637) 8   Physical Therapy Goals   PT Frequency 6x/week   PT Predicted Duration/Target Date for Goal Attainment 06/22/23   PT Goals Transfers   PT: Bed Mobility Independent;Supine to/from sit;Within precautions   PT: Transfers Independent;Sit to/from stand   PT: Gait Independent;Greater than 200 feet   PT: Stairs Modified independent;8 stairs;Rail on right   PT Discharge Planning   PT Plan bed > chair, sit > stand when hypotension improves   PT Discharge Recommendation (DC Rec) home with assist;Transitional Care Facility   PT Rationale for DC Rec currently limited by  fatigue/hypotension; based on PLOF expect to make good progress during expected length of acute stay   PT Brief overview of current status bed > chair via lift; unable to attempt sit > stand due to hypotension   Total Session Time   Total Session Time (sum of timed and untimed services) 8      06/08/23 0900   Appointment Info   Signing Clinician's Name / Credentials (PT) lise harding, pt   Living Environment   People in Home spouse   Current Living Arrangements house   Home Accessibility stairs within home   Number of Stairs, Main Entrance   (has ramp to enter home)   Number of Stairs, Within Home, Primary eight  (split level, 8 steps to change levels)   Stair Railings, Within Home, Primary railing on right side (ascending)   Transportation Anticipated family or friend will provide   Living Environment Comments spouse can assist at home   Self-Care   Usual Activity Tolerance moderate   Current Activity Tolerance poor   Regular Exercise No   Equipment Currently Used at Home walker, rolling  (has 4WW)   Fall history within last six months no   Activity/Exercise/Self-Care Comment IND with functional mobility and ADLs during 2 weeks pre transplant, has needed 4WW in past as mobility had been variable.  limited to household and short community distances   General Information   Onset of Illness/Injury or Date of Surgery 06/05/23   Referring Physician Subhash Singh MD   Patient/Family Therapy Goals Statement (PT) return home   Pertinent History of Current Problem (include personal factors and/or comorbidities that impact the POC) 67 year old male with PMHx of decompensated alcohol + hemochromatosis (homozygous H63D) cirrhosis complicated by variceal bleeding s/p TIPS (10/1/2022) and hepatic encephalopathy + CKD (IgA nephropathy + ANCA vasculitis) s/p simultaneous liver kidney transplant on 6/6/2023. RTOR on 6/6/203 with concern for low flow in the renal graft - ex-lap, washout, closure with healthy appearing graft with  intact arterial and venous flow.   Existing Precautions/Restrictions abdominal   Cognition   Affect/Mental Status (Cognition) WFL   Orientation Status (Cognition) oriented x 4   Follows Commands (Cognition) WFL   Integumentary/Edema   Integumentary/Edema Comments edema noted B LE   Posture    Posture Forward head position   Range of Motion (ROM)   ROM Comment B LE grossly WFL   Strength (Manual Muscle Testing)   Strength Comments B LE grossly 5/5   Bed Mobility   Comment, (Bed Mobility) roll L <> R max assist   Balance   Balance Comments needed min/mod assist to maintain sitting balance at edge of chair   Sensory Examination   Sensory Perception Comments light touch/proprioception intact B LE   Muscle Tone   Muscle Tone no deficits were identified   Clinical Impression   Criteria for Skilled Therapeutic Intervention Yes, treatment indicated   PT Diagnosis (PT) impaired functional mobility s/p liver transplant   Influenced by the following impairments pain, impaired balance, decreased activity tolerance   Functional limitations due to impairments impaired bed mobility, impaired transfers, impaired gait   Clinical Presentation (PT Evaluation Complexity) Evolving/Changing   Clinical Presentation Rationale clinical judgement   Clinical Decision Making (Complexity) low complexity   Planned Therapy Interventions (PT) balance training;bed mobility training;gait training;patient/family education;stair training;transfer training;progressive activity/exercise   Risk & Benefits of therapy have been explained evaluation/treatment results reviewed;care plan/treatment goals reviewed   PT Total Evaluation Time   PT Eval, Low Complexity Minutes (11284) 8   Physical Therapy Goals   PT Frequency 6x/week   PT Predicted Duration/Target Date for Goal Attainment 06/22/23   PT Goals Transfers   PT: Bed Mobility Independent;Supine to/from sit;Within precautions   PT: Transfers Independent;Sit to/from stand   PT: Gait Independent;Greater  than 200 feet   PT: Stairs Modified independent;8 stairs;Rail on right   PT Discharge Planning   PT Plan bed > chair, sit > stand when hypotension improves   PT Discharge Recommendation (DC Rec) home with assist;Transitional Care Facility   PT Rationale for DC Rec currently limited by fatigue/hypotension; based on PLOF expect to make good progress during expected length of acute stay   PT Brief overview of current status bed > chair via lift; unable to attempt sit > stand due to hypotension   Total Session Time   Total Session Time (sum of timed and untimed services) 8

## 2023-06-09 ENCOUNTER — APPOINTMENT (OUTPATIENT)
Dept: GENERAL RADIOLOGY | Facility: CLINIC | Age: 68
End: 2023-06-09
Attending: INTERNAL MEDICINE
Payer: COMMERCIAL

## 2023-06-09 DIAGNOSIS — Z94.4 LIVER REPLACED BY TRANSPLANT (H): Primary | ICD-10-CM

## 2023-06-09 DIAGNOSIS — K70.31 ALCOHOLIC CIRRHOSIS OF LIVER WITH ASCITES (H): ICD-10-CM

## 2023-06-09 DIAGNOSIS — B99.8 OTHER INFECTIOUS DISEASE: ICD-10-CM

## 2023-06-09 LAB
ALBUMIN SERPL BCG-MCNC: 2.4 G/DL (ref 3.5–5.2)
ALBUMIN SERPL BCG-MCNC: 2.4 G/DL (ref 3.5–5.2)
ALBUMIN SERPL BCG-MCNC: 2.5 G/DL (ref 3.5–5.2)
ALP SERPL-CCNC: 177 U/L (ref 40–129)
ALT SERPL W P-5'-P-CCNC: 566 U/L (ref 10–50)
ANION GAP SERPL CALCULATED.3IONS-SCNC: 11 MMOL/L (ref 7–15)
ANION GAP SERPL CALCULATED.3IONS-SCNC: 11 MMOL/L (ref 7–15)
ANION GAP SERPL CALCULATED.3IONS-SCNC: 13 MMOL/L (ref 7–15)
APTT PPP: 28 SECONDS (ref 22–38)
APTT PPP: 31 SECONDS (ref 22–38)
AST SERPL W P-5'-P-CCNC: 405 U/L (ref 10–50)
BASOPHILS # BLD AUTO: 0 10E3/UL (ref 0–0.2)
BASOPHILS NFR BLD AUTO: 0 %
BILIRUB DIRECT SERPL-MCNC: 1.23 MG/DL (ref 0–0.3)
BILIRUB SERPL-MCNC: 1.5 MG/DL
BLD PROD TYP BPU: NORMAL
BLOOD COMPONENT TYPE: NORMAL
BUN SERPL-MCNC: 29 MG/DL (ref 8–23)
BUN SERPL-MCNC: 32.2 MG/DL (ref 8–23)
BUN SERPL-MCNC: 32.2 MG/DL (ref 8–23)
CA-I BLD-MCNC: 4.3 MG/DL (ref 4.4–5.2)
CA-I BLD-MCNC: 4.3 MG/DL (ref 4.4–5.2)
CA-I BLD-MCNC: 4.6 MG/DL (ref 4.4–5.2)
CALCIUM SERPL-MCNC: 7.6 MG/DL (ref 8.8–10.2)
CALCIUM SERPL-MCNC: 7.7 MG/DL (ref 8.8–10.2)
CALCIUM SERPL-MCNC: 8.2 MG/DL (ref 8.8–10.2)
CELL TYPE ALLO: NORMAL
CELL TYPE AUTO: NORMAL
CHANNELSHIFTALLOT1: 5
CHANNELSHIFTALLOT2: -11
CHANNELSHIFTAUTOT1: -33
CHANNELSHIFTAUTOT2: -47
CHLORIDE SERPL-SCNC: 102 MMOL/L (ref 98–107)
CHLORIDE SERPL-SCNC: 104 MMOL/L (ref 98–107)
CHLORIDE SERPL-SCNC: 104 MMOL/L (ref 98–107)
CLOT INIT KAOL IND TO POST HEP NEUT TRTO: 1 {RATIO}
CLOT INIT KAOL IND TO POST HEP NEUT TRTO: 1 {RATIO}
CLOT INIT KAOL IND TO POST HEP NEUT TRTO: 1.1 {RATIO}
CLOT INIT KAOLIN IND BLD US: 146 SEC (ref 113–166)
CLOT INIT KAOLIN IND BLD US: 151 SEC (ref 113–166)
CLOT INIT KAOLIN IND BLD US: 158 SEC (ref 113–166)
CLOT INIT KAOLIN IND P HEP NEUT BLD US: 143 SEC (ref 103–153)
CLOT INIT KAOLIN IND P HEP NEUT BLD US: 147 SEC (ref 103–153)
CLOT INIT KAOLIN IND P HEP NEUT BLD US: 148 SEC (ref 103–153)
CLOT STIFF PLT CONT BLD CALC: 12.4 HPA (ref 11.9–29.8)
CLOT STIFF PLT CONT BLD CALC: 25.1 HPA (ref 11.9–29.8)
CLOT STIFF PLT CONT BLD CALC: 8.4 HPA (ref 11.9–29.8)
CLOT STIFF TF IND P HEP NEUT BLD US: 14.5 HPA (ref 13–33.2)
CLOT STIFF TF IND P HEP NEUT BLD US: 29 HPA (ref 13–33.2)
CLOT STIFF TF IND P HEP NEUT BLD US: 9.6 HPA (ref 13–33.2)
CLOT STIFF TF IND+IIB-IIIA INH P HEP NEU: 1.2 HPA (ref 1–3.7)
CLOT STIFF TF IND+IIB-IIIA INH P HEP NEU: 2.1 HPA (ref 1–3.7)
CLOT STIFF TF IND+IIB-IIIA INH P HEP NEU: 3.9 HPA (ref 1–3.7)
CODING SYSTEM: NORMAL
CREAT SERPL-MCNC: 1.52 MG/DL (ref 0.67–1.17)
CREAT SERPL-MCNC: 1.59 MG/DL (ref 0.67–1.17)
CREAT SERPL-MCNC: 1.61 MG/DL (ref 0.67–1.17)
CROSSMATCHDATEALLO: NORMAL
CROSSMATCHDATEAUTO: NORMAL
DEPRECATED HCO3 PLAS-SCNC: 20 MMOL/L (ref 22–29)
DEPRECATED HCO3 PLAS-SCNC: 22 MMOL/L (ref 22–29)
DEPRECATED HCO3 PLAS-SCNC: 22 MMOL/L (ref 22–29)
DONOR ALLO: NORMAL
DONOR AUTO: NORMAL
DONORCELLDATE ALLO: NORMAL
DONORCELLDATE AUTO: NORMAL
EOSINOPHIL # BLD AUTO: 0 10E3/UL (ref 0–0.7)
EOSINOPHIL NFR BLD AUTO: 0 %
ERYTHROCYTE [DISTWIDTH] IN BLOOD BY AUTOMATED COUNT: 17.5 % (ref 10–15)
ERYTHROCYTE [DISTWIDTH] IN BLOOD BY AUTOMATED COUNT: 17.6 % (ref 10–15)
ERYTHROCYTE [DISTWIDTH] IN BLOOD BY AUTOMATED COUNT: 17.7 % (ref 10–15)
FIBRINOGEN PPP-MCNC: 397 MG/DL (ref 170–490)
FIBRINOGEN PPP-MCNC: 415 MG/DL (ref 170–490)
GFR SERPL CREATININE-BSD FRML MDRD: 47 ML/MIN/1.73M2
GFR SERPL CREATININE-BSD FRML MDRD: 48 ML/MIN/1.73M2
GFR SERPL CREATININE-BSD FRML MDRD: 51 ML/MIN/1.73M2
GLUCOSE BLDC GLUCOMTR-MCNC: 114 MG/DL (ref 70–99)
GLUCOSE BLDC GLUCOMTR-MCNC: 115 MG/DL (ref 70–99)
GLUCOSE BLDC GLUCOMTR-MCNC: 115 MG/DL (ref 70–99)
GLUCOSE BLDC GLUCOMTR-MCNC: 116 MG/DL (ref 70–99)
GLUCOSE BLDC GLUCOMTR-MCNC: 131 MG/DL (ref 70–99)
GLUCOSE BLDC GLUCOMTR-MCNC: 134 MG/DL (ref 70–99)
GLUCOSE BLDC GLUCOMTR-MCNC: 139 MG/DL (ref 70–99)
GLUCOSE BLDC GLUCOMTR-MCNC: 140 MG/DL (ref 70–99)
GLUCOSE BLDC GLUCOMTR-MCNC: 144 MG/DL (ref 70–99)
GLUCOSE BLDC GLUCOMTR-MCNC: 147 MG/DL (ref 70–99)
GLUCOSE BLDC GLUCOMTR-MCNC: 156 MG/DL (ref 70–99)
GLUCOSE BLDC GLUCOMTR-MCNC: 158 MG/DL (ref 70–99)
GLUCOSE BLDC GLUCOMTR-MCNC: 162 MG/DL (ref 70–99)
GLUCOSE SERPL-MCNC: 131 MG/DL (ref 70–99)
GLUCOSE SERPL-MCNC: 136 MG/DL (ref 70–99)
GLUCOSE SERPL-MCNC: 139 MG/DL (ref 70–99)
HBV DNA SERPL NAA+PROBE-ACNC: NOT DETECTED IU/ML
HBV E AG SERPL QL IA: NEGATIVE
HCT VFR BLD AUTO: 24.3 % (ref 40–53)
HCT VFR BLD AUTO: 24.4 % (ref 40–53)
HCT VFR BLD AUTO: 25.9 % (ref 40–53)
HGB BLD-MCNC: 7.5 G/DL (ref 13.3–17.7)
HGB BLD-MCNC: 7.7 G/DL (ref 13.3–17.7)
HGB BLD-MCNC: 8.1 G/DL (ref 13.3–17.7)
IMM GRANULOCYTES # BLD: 0.1 10E3/UL
IMM GRANULOCYTES NFR BLD: 1 %
INR PPP: 1.38 (ref 0.85–1.15)
INR PPP: 1.6 (ref 0.85–1.15)
ISSUE DATE AND TIME: NORMAL
LACTATE SERPL-SCNC: 1.6 MMOL/L (ref 0.7–2)
LYMPHOCYTES # BLD AUTO: 0 10E3/UL (ref 0.8–5.3)
LYMPHOCYTES NFR BLD AUTO: 0 %
MAGNESIUM SERPL-MCNC: 2.6 MG/DL (ref 1.7–2.3)
MAGNESIUM SERPL-MCNC: 2.6 MG/DL (ref 1.7–2.3)
MAGNESIUM SERPL-MCNC: 2.7 MG/DL (ref 1.7–2.3)
MCH RBC QN AUTO: 29.8 PG (ref 26.5–33)
MCH RBC QN AUTO: 29.9 PG (ref 26.5–33)
MCH RBC QN AUTO: 30.1 PG (ref 26.5–33)
MCHC RBC AUTO-ENTMCNC: 30.7 G/DL (ref 31.5–36.5)
MCHC RBC AUTO-ENTMCNC: 31.3 G/DL (ref 31.5–36.5)
MCHC RBC AUTO-ENTMCNC: 31.7 G/DL (ref 31.5–36.5)
MCV RBC AUTO: 95 FL (ref 78–100)
MCV RBC AUTO: 96 FL (ref 78–100)
MCV RBC AUTO: 97 FL (ref 78–100)
MONOCYTES # BLD AUTO: 0.4 10E3/UL (ref 0–1.3)
MONOCYTES NFR BLD AUTO: 4 %
NEUTROPHILS # BLD AUTO: 10.4 10E3/UL (ref 1.6–8.3)
NEUTROPHILS NFR BLD AUTO: 95 %
NRBC # BLD AUTO: 0.1 10E3/UL
NRBC BLD AUTO-RTO: 1 /100
PHOSPHATE SERPL-MCNC: 5.1 MG/DL (ref 2.5–4.5)
PHOSPHATE SERPL-MCNC: 5.4 MG/DL (ref 2.5–4.5)
PHOSPHATE SERPL-MCNC: 5.6 MG/DL (ref 2.5–4.5)
PLATELET # BLD AUTO: 47 10E3/UL (ref 150–450)
PLATELET # BLD AUTO: 52 10E3/UL (ref 150–450)
PLATELET # BLD AUTO: 54 10E3/UL (ref 150–450)
PLATELET # BLD AUTO: 55 10E3/UL (ref 150–450)
POS CUT OFF ALLO B: >69
POS CUT OFF ALLO T: >53
POS CUT OFF AUTO B: >69
POS CUT OFF AUTO T: >53
POTASSIUM SERPL-SCNC: 4.9 MMOL/L (ref 3.4–5.3)
POTASSIUM SERPL-SCNC: 5 MMOL/L (ref 3.4–5.3)
POTASSIUM SERPL-SCNC: 5.1 MMOL/L (ref 3.4–5.3)
PROT SERPL-MCNC: 4.6 G/DL (ref 6.4–8.3)
RBC # BLD AUTO: 2.52 10E6/UL (ref 4.4–5.9)
RBC # BLD AUTO: 2.56 10E6/UL (ref 4.4–5.9)
RBC # BLD AUTO: 2.71 10E6/UL (ref 4.4–5.9)
RESULT ALLO B1: NORMAL
RESULT ALLO B2: NORMAL
RESULT ALLO T1: NORMAL
RESULT ALLO T2: NORMAL
RESULT AUTO B1: NORMAL
RESULT AUTO B2: NORMAL
RESULT AUTO T1: NORMAL
RESULT AUTO T2: NORMAL
SERUM DATE ALLO B1: NORMAL
SERUM DATE ALLO B2: NORMAL
SERUM DATE ALLO T1: NORMAL
SERUM DATE ALLO T2: NORMAL
SERUM DATE AUTO B1: NORMAL
SERUM DATE AUTO B2: NORMAL
SERUM DATE AUTO T1: NORMAL
SERUM DATE AUTO T2: NORMAL
SODIUM SERPL-SCNC: 135 MMOL/L (ref 136–145)
SODIUM SERPL-SCNC: 137 MMOL/L (ref 136–145)
SODIUM SERPL-SCNC: 137 MMOL/L (ref 136–145)
TACROLIMUS BLD-MCNC: 3.2 UG/L (ref 5–15)
TESTMETHODALLO: NORMAL
TESTMETHODAUTO: NORMAL
TME LAST DOSE: ABNORMAL H
TME LAST DOSE: ABNORMAL H
TREATMENT ALLO B1: NORMAL
TREATMENT ALLO B2: NORMAL
TREATMENT ALLO T1: NORMAL
TREATMENT ALLO T2: NORMAL
TREATMENT AUTO B1: NORMAL
TREATMENT AUTO B2: NORMAL
TREATMENT AUTO T1: NORMAL
TREATMENT AUTO T2: NORMAL
UNIT ABO/RH: NORMAL
UNIT NUMBER: NORMAL
UNIT STATUS: NORMAL
UNIT TYPE ISBT: 5100
WBC # BLD AUTO: 10.9 10E3/UL (ref 4–11)
WBC # BLD AUTO: 11.9 10E3/UL (ref 4–11)
WBC # BLD AUTO: 6.3 10E3/UL (ref 4–11)
ZZZCOMMENT ALLOB2: NORMAL

## 2023-06-09 PROCEDURE — 85610 PROTHROMBIN TIME: CPT | Performed by: TRANSPLANT SURGERY

## 2023-06-09 PROCEDURE — 250N000009 HC RX 250: Performed by: SURGERY

## 2023-06-09 PROCEDURE — 85025 COMPLETE CBC W/AUTO DIFF WBC: CPT | Performed by: SURGERY

## 2023-06-09 PROCEDURE — 250N000012 HC RX MED GY IP 250 OP 636 PS 637: Performed by: TRANSPLANT SURGERY

## 2023-06-09 PROCEDURE — 83735 ASSAY OF MAGNESIUM: CPT | Performed by: SURGERY

## 2023-06-09 PROCEDURE — 84075 ASSAY ALKALINE PHOSPHATASE: CPT | Performed by: SURGERY

## 2023-06-09 PROCEDURE — 85730 THROMBOPLASTIN TIME PARTIAL: CPT | Performed by: TRANSPLANT SURGERY

## 2023-06-09 PROCEDURE — 82330 ASSAY OF CALCIUM: CPT | Performed by: SURGERY

## 2023-06-09 PROCEDURE — 250N000013 HC RX MED GY IP 250 OP 250 PS 637: Performed by: NURSE PRACTITIONER

## 2023-06-09 PROCEDURE — 250N000011 HC RX IP 250 OP 636: Performed by: NURSE PRACTITIONER

## 2023-06-09 PROCEDURE — 85027 COMPLETE CBC AUTOMATED: CPT | Performed by: TRANSPLANT SURGERY

## 2023-06-09 PROCEDURE — 250N000011 HC RX IP 250 OP 636: Performed by: STUDENT IN AN ORGANIZED HEALTH CARE EDUCATION/TRAINING PROGRAM

## 2023-06-09 PROCEDURE — 85049 AUTOMATED PLATELET COUNT: CPT | Performed by: TRANSPLANT SURGERY

## 2023-06-09 PROCEDURE — 84100 ASSAY OF PHOSPHORUS: CPT | Performed by: SURGERY

## 2023-06-09 PROCEDURE — 250N000009 HC RX 250

## 2023-06-09 PROCEDURE — 250N000013 HC RX MED GY IP 250 OP 250 PS 637

## 2023-06-09 PROCEDURE — 99233 SBSQ HOSP IP/OBS HIGH 50: CPT | Mod: 24 | Performed by: SURGERY

## 2023-06-09 PROCEDURE — 250N000013 HC RX MED GY IP 250 OP 250 PS 637: Performed by: SURGERY

## 2023-06-09 PROCEDURE — 87077 CULTURE AEROBIC IDENTIFY: CPT | Performed by: TRANSPLANT SURGERY

## 2023-06-09 PROCEDURE — 250N000011 HC RX IP 250 OP 636: Performed by: SURGERY

## 2023-06-09 PROCEDURE — 250N000009 HC RX 250: Performed by: INTERNAL MEDICINE

## 2023-06-09 PROCEDURE — 258N000003 HC RX IP 258 OP 636: Performed by: NURSE PRACTITIONER

## 2023-06-09 PROCEDURE — 250N000011 HC RX IP 250 OP 636

## 2023-06-09 PROCEDURE — 85049 AUTOMATED PLATELET COUNT: CPT | Performed by: SURGERY

## 2023-06-09 PROCEDURE — 999N000065 XR ABDOMEN PORT 1 VIEW

## 2023-06-09 PROCEDURE — 36415 COLL VENOUS BLD VENIPUNCTURE: CPT | Performed by: TRANSPLANT SURGERY

## 2023-06-09 PROCEDURE — 80197 ASSAY OF TACROLIMUS: CPT | Performed by: SURGERY

## 2023-06-09 PROCEDURE — 85384 FIBRINOGEN ACTIVITY: CPT | Performed by: TRANSPLANT SURGERY

## 2023-06-09 PROCEDURE — 83605 ASSAY OF LACTIC ACID: CPT

## 2023-06-09 PROCEDURE — 258N000003 HC RX IP 258 OP 636: Performed by: SURGERY

## 2023-06-09 PROCEDURE — 200N000002 HC R&B ICU UMMC

## 2023-06-09 PROCEDURE — 99232 SBSQ HOSP IP/OBS MODERATE 35: CPT | Mod: 24 | Performed by: TRANSPLANT SURGERY

## 2023-06-09 PROCEDURE — 74018 RADEX ABDOMEN 1 VIEW: CPT | Mod: 26 | Performed by: RADIOLOGY

## 2023-06-09 PROCEDURE — 250N000012 HC RX MED GY IP 250 OP 636 PS 637: Performed by: INTERNAL MEDICINE

## 2023-06-09 PROCEDURE — 87149 DNA/RNA DIRECT PROBE: CPT | Performed by: TRANSPLANT SURGERY

## 2023-06-09 PROCEDURE — P9037 PLATE PHERES LEUKOREDU IRRAD: HCPCS | Performed by: STUDENT IN AN ORGANIZED HEALTH CARE EDUCATION/TRAINING PROGRAM

## 2023-06-09 PROCEDURE — 82248 BILIRUBIN DIRECT: CPT | Performed by: SURGERY

## 2023-06-09 PROCEDURE — 250N000013 HC RX MED GY IP 250 OP 250 PS 637: Performed by: INTERNAL MEDICINE

## 2023-06-09 PROCEDURE — 99233 SBSQ HOSP IP/OBS HIGH 50: CPT | Mod: FS

## 2023-06-09 RX ORDER — METOPROLOL TARTRATE 25 MG/1
25 TABLET, FILM COATED ORAL EVERY 8 HOURS
Status: DISCONTINUED | OUTPATIENT
Start: 2023-06-09 | End: 2023-06-13

## 2023-06-09 RX ORDER — LIDOCAINE HYDROCHLORIDE 20 MG/ML
SOLUTION OROPHARYNGEAL
Status: COMPLETED
Start: 2023-06-09 | End: 2023-06-09

## 2023-06-09 RX ORDER — DIPHENHYDRAMINE HCL 25 MG
25-50 CAPSULE ORAL ONCE
Status: COMPLETED | OUTPATIENT
Start: 2023-06-09 | End: 2023-06-09

## 2023-06-09 RX ORDER — DIPHENHYDRAMINE HCL 12.5MG/5ML
25-50 LIQUID (ML) ORAL ONCE
Status: COMPLETED | OUTPATIENT
Start: 2023-06-09 | End: 2023-06-09

## 2023-06-09 RX ORDER — TACROLIMUS 1 MG/1
2 CAPSULE ORAL
Status: DISCONTINUED | OUTPATIENT
Start: 2023-06-09 | End: 2023-06-13

## 2023-06-09 RX ORDER — METOCLOPRAMIDE HYDROCHLORIDE 5 MG/ML
5 INJECTION INTRAMUSCULAR; INTRAVENOUS
Status: COMPLETED | OUTPATIENT
Start: 2023-06-09 | End: 2023-06-09

## 2023-06-09 RX ORDER — CALCIUM CHLORIDE, MAGNESIUM CHLORIDE, SODIUM CHLORIDE, SODIUM BICARBONATE, POTASSIUM CHLORIDE AND SODIUM PHOSPHATE DIBASIC DIHYDRATE 3.68; 3.05; 6.34; 3.09; .314; .187 G/L; G/L; G/L; G/L; G/L; G/L
12.5 INJECTION INTRAVENOUS CONTINUOUS
Status: DISCONTINUED | OUTPATIENT
Start: 2023-06-09 | End: 2023-06-10

## 2023-06-09 RX ORDER — MIDODRINE HYDROCHLORIDE 5 MG/1
5 TABLET ORAL EVERY 8 HOURS
Status: DISCONTINUED | OUTPATIENT
Start: 2023-06-09 | End: 2023-06-10

## 2023-06-09 RX ORDER — PREDNISONE 10 MG/1
10 TABLET ORAL DAILY
Status: DISCONTINUED | OUTPATIENT
Start: 2023-06-11 | End: 2023-06-12

## 2023-06-09 RX ORDER — ACETAMINOPHEN 325 MG/1
650 TABLET ORAL ONCE
Status: COMPLETED | OUTPATIENT
Start: 2023-06-09 | End: 2023-06-09

## 2023-06-09 RX ADMIN — CALCIUM CHLORIDE, MAGNESIUM CHLORIDE, SODIUM CHLORIDE, SODIUM BICARBONATE, POTASSIUM CHLORIDE AND SODIUM PHOSPHATE DIBASIC DIHYDRATE 12.5 ML/KG/HR: 3.68; 3.05; 6.34; 3.09; .314; .187 INJECTION INTRAVENOUS at 16:57

## 2023-06-09 RX ADMIN — OXYCODONE HYDROCHLORIDE 5 MG: 5 TABLET ORAL at 08:02

## 2023-06-09 RX ADMIN — URSODIOL 250 MG: 250 TABLET, FILM COATED ORAL at 08:03

## 2023-06-09 RX ADMIN — URSODIOL 250 MG: 250 TABLET, FILM COATED ORAL at 19:44

## 2023-06-09 RX ADMIN — CALCIUM CHLORIDE, MAGNESIUM CHLORIDE, SODIUM CHLORIDE, SODIUM BICARBONATE, POTASSIUM CHLORIDE AND SODIUM PHOSPHATE DIBASIC DIHYDRATE 12.5 ML/KG/HR: 3.68; 3.05; 6.34; 3.09; .314; .187 INJECTION INTRAVENOUS at 08:04

## 2023-06-09 RX ADMIN — CALCIUM CHLORIDE, MAGNESIUM CHLORIDE, SODIUM CHLORIDE, SODIUM BICARBONATE, POTASSIUM CHLORIDE AND SODIUM PHOSPHATE DIBASIC DIHYDRATE 12.5 ML/KG/HR: 3.68; 3.05; 6.34; 3.09; .314; .187 INJECTION INTRAVENOUS at 00:07

## 2023-06-09 RX ADMIN — ACETAMINOPHEN 650 MG: 325 TABLET, FILM COATED ORAL at 14:05

## 2023-06-09 RX ADMIN — CARBIDOPA AND LEVODOPA 2.5 MG: 50; 200 TABLET, EXTENDED RELEASE ORAL at 03:55

## 2023-06-09 RX ADMIN — CALCIUM CHLORIDE, MAGNESIUM CHLORIDE, SODIUM CHLORIDE, SODIUM BICARBONATE, POTASSIUM CHLORIDE AND SODIUM PHOSPHATE DIBASIC DIHYDRATE 12.5 ML/KG/HR: 3.68; 3.05; 6.34; 3.09; .314; .187 INJECTION INTRAVENOUS at 20:33

## 2023-06-09 RX ADMIN — ONDANSETRON 4 MG: 2 INJECTION INTRAMUSCULAR; INTRAVENOUS at 11:40

## 2023-06-09 RX ADMIN — CALCIUM CHLORIDE, MAGNESIUM CHLORIDE, SODIUM CHLORIDE, SODIUM BICARBONATE, POTASSIUM CHLORIDE AND SODIUM PHOSPHATE DIBASIC DIHYDRATE 12.5 ML/KG/HR: 3.68; 3.05; 6.34; 3.09; .314; .187 INJECTION INTRAVENOUS at 00:06

## 2023-06-09 RX ADMIN — ANTI-THYMOCYTE GLOBULIN (RABBIT) 100 MG: 5 INJECTION, POWDER, LYOPHILIZED, FOR SOLUTION INTRAVENOUS at 14:27

## 2023-06-09 RX ADMIN — LIDOCAINE HYDROCHLORIDE 15 ML: 20 SOLUTION ORAL; TOPICAL at 11:43

## 2023-06-09 RX ADMIN — Medication 5 ML: at 08:02

## 2023-06-09 RX ADMIN — METOCLOPRAMIDE 5 MG: 5 INJECTION, SOLUTION INTRAMUSCULAR; INTRAVENOUS at 12:08

## 2023-06-09 RX ADMIN — CALCIUM CHLORIDE, MAGNESIUM CHLORIDE, SODIUM CHLORIDE, SODIUM BICARBONATE, POTASSIUM CHLORIDE AND SODIUM PHOSPHATE DIBASIC DIHYDRATE 12.5 ML/KG/HR: 3.68; 3.05; 6.34; 3.09; .314; .187 INJECTION INTRAVENOUS at 09:38

## 2023-06-09 RX ADMIN — ASPIRIN 325 MG: 325 TABLET ORAL at 08:03

## 2023-06-09 RX ADMIN — HUMAN INSULIN 1.5 UNITS/HR: 100 INJECTION, SOLUTION SUBCUTANEOUS at 17:25

## 2023-06-09 RX ADMIN — METHOCARBAMOL 500 MG: 500 TABLET ORAL at 08:03

## 2023-06-09 RX ADMIN — CALCIUM CHLORIDE, MAGNESIUM CHLORIDE, SODIUM CHLORIDE, SODIUM BICARBONATE, POTASSIUM CHLORIDE AND SODIUM PHOSPHATE DIBASIC DIHYDRATE 12.5 ML/KG/HR: 3.68; 3.05; 6.34; 3.09; .314; .187 INJECTION INTRAVENOUS at 03:54

## 2023-06-09 RX ADMIN — HEPARIN SODIUM 5000 UNITS: 5000 INJECTION, SOLUTION INTRAVENOUS; SUBCUTANEOUS at 19:44

## 2023-06-09 RX ADMIN — METOPROLOL TARTRATE 25 MG: 25 TABLET, FILM COATED ORAL at 08:03

## 2023-06-09 RX ADMIN — CALCIUM CHLORIDE, MAGNESIUM CHLORIDE, SODIUM CHLORIDE, SODIUM BICARBONATE, POTASSIUM CHLORIDE AND SODIUM PHOSPHATE DIBASIC DIHYDRATE 12.5 ML/KG/HR: 3.68; 3.05; 6.34; 3.09; .314; .187 INJECTION INTRAVENOUS at 03:55

## 2023-06-09 RX ADMIN — MYCOPHENOLATE MOFETIL 750 MG: 200 POWDER, FOR SUSPENSION ORAL at 18:08

## 2023-06-09 RX ADMIN — HEPARIN SODIUM 5000 UNITS: 5000 INJECTION, SOLUTION INTRAVENOUS; SUBCUTANEOUS at 08:06

## 2023-06-09 RX ADMIN — DIPHENHYDRAMINE HYDROCHLORIDE 25 MG: 25 SOLUTION ORAL at 14:05

## 2023-06-09 RX ADMIN — METHOCARBAMOL 500 MG: 500 TABLET ORAL at 14:27

## 2023-06-09 RX ADMIN — SULFAMETHOXAZOLE AND TRIMETHOPRIM 1 TABLET: 400; 80 TABLET ORAL at 08:03

## 2023-06-09 RX ADMIN — CALCIUM CHLORIDE, MAGNESIUM CHLORIDE, SODIUM CHLORIDE, SODIUM BICARBONATE, POTASSIUM CHLORIDE AND SODIUM PHOSPHATE DIBASIC DIHYDRATE: 3.68; 3.05; 6.34; 3.09; .314; .187 INJECTION INTRAVENOUS at 03:54

## 2023-06-09 RX ADMIN — MYCOPHENOLATE MOFETIL 750 MG: 200 POWDER, FOR SUSPENSION ORAL at 08:02

## 2023-06-09 RX ADMIN — VALGANCICLOVIR 450 MG: 50 FOR SOLUTION ORAL at 08:02

## 2023-06-09 RX ADMIN — CARBIDOPA AND LEVODOPA 5 MG: 50; 200 TABLET, EXTENDED RELEASE ORAL at 19:44

## 2023-06-09 RX ADMIN — Medication 40 MG: at 08:02

## 2023-06-09 RX ADMIN — CALCIUM GLUCONATE 2 G: 20 INJECTION, SOLUTION INTRAVENOUS at 21:06

## 2023-06-09 RX ADMIN — MICAFUNGIN SODIUM 100 MG: 50 INJECTION, POWDER, LYOPHILIZED, FOR SOLUTION INTRAVENOUS at 00:07

## 2023-06-09 RX ADMIN — METOPROLOL TARTRATE 25 MG: 25 TABLET, FILM COATED ORAL at 23:47

## 2023-06-09 RX ADMIN — CALCIUM CHLORIDE, MAGNESIUM CHLORIDE, SODIUM CHLORIDE, SODIUM BICARBONATE, POTASSIUM CHLORIDE AND SODIUM PHOSPHATE DIBASIC DIHYDRATE 12.5 ML/KG/HR: 3.68; 3.05; 6.34; 3.09; .314; .187 INJECTION INTRAVENOUS at 13:19

## 2023-06-09 RX ADMIN — CALCIUM CHLORIDE, MAGNESIUM CHLORIDE, SODIUM CHLORIDE, SODIUM BICARBONATE, POTASSIUM CHLORIDE AND SODIUM PHOSPHATE DIBASIC DIHYDRATE 12.5 ML/KG/HR: 3.68; 3.05; 6.34; 3.09; .314; .187 INJECTION INTRAVENOUS at 09:39

## 2023-06-09 RX ADMIN — TACROLIMUS 2 MG: 1 CAPSULE ORAL at 18:09

## 2023-06-09 RX ADMIN — METHOCARBAMOL 500 MG: 500 TABLET ORAL at 19:44

## 2023-06-09 RX ADMIN — CALCIUM CHLORIDE, MAGNESIUM CHLORIDE, SODIUM CHLORIDE, SODIUM BICARBONATE, POTASSIUM CHLORIDE AND SODIUM PHOSPHATE DIBASIC DIHYDRATE 12.5 ML/KG/HR: 3.68; 3.05; 6.34; 3.09; .314; .187 INJECTION INTRAVENOUS at 13:18

## 2023-06-09 RX ADMIN — SENNOSIDES 8.6 MG: 8.6 TABLET, COATED ORAL at 19:44

## 2023-06-09 RX ADMIN — POLYETHYLENE GLYCOL 3350 17 G: 17 POWDER, FOR SOLUTION ORAL at 08:04

## 2023-06-09 RX ADMIN — CALCIUM CHLORIDE, MAGNESIUM CHLORIDE, SODIUM CHLORIDE, SODIUM BICARBONATE, POTASSIUM CHLORIDE AND SODIUM PHOSPHATE DIBASIC DIHYDRATE 12.5 ML/KG/HR: 3.68; 3.05; 6.34; 3.09; .314; .187 INJECTION INTRAVENOUS at 20:37

## 2023-06-09 RX ADMIN — SENNOSIDES 8.6 MG: 8.6 TABLET, COATED ORAL at 08:03

## 2023-06-09 RX ADMIN — OXYCODONE HYDROCHLORIDE 2.5 MG: 5 TABLET ORAL at 19:44

## 2023-06-09 RX ADMIN — METHOCARBAMOL 500 MG: 500 TABLET ORAL at 17:04

## 2023-06-09 RX ADMIN — METOPROLOL TARTRATE 25 MG: 25 TABLET, FILM COATED ORAL at 17:04

## 2023-06-09 RX ADMIN — CARBIDOPA AND LEVODOPA 5 MG: 50; 200 TABLET, EXTENDED RELEASE ORAL at 10:53

## 2023-06-09 RX ADMIN — ONDANSETRON 4 MG: 2 INJECTION INTRAMUSCULAR; INTRAVENOUS at 19:48

## 2023-06-09 RX ADMIN — METHYLPREDNISOLONE SODIUM SUCCINATE 50 MG: 125 INJECTION, POWDER, FOR SOLUTION INTRAMUSCULAR; INTRAVENOUS at 08:04

## 2023-06-09 ASSESSMENT — ACTIVITIES OF DAILY LIVING (ADL)
ADLS_ACUITY_SCORE: 36
ADLS_ACUITY_SCORE: 37
ADLS_ACUITY_SCORE: 36

## 2023-06-09 NOTE — PROGRESS NOTES
[facility]   Transplant Nephrology Progress Note  Date of Admission:  6/5/2023  Today's Date: 06/09/2023    Recommendations:  - Continue on CRRT today.  - Pre filter and dialysate flows increased to 1400ml/hr each to improve clearance.  - Fluid removal increased to /hr as tolerated.  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 Remove 4-6 weeks post transplant.   -Pt taken back to OR on 6/7/23 due to lack of blood flow to the kidney transplant but intra op U/S normal.     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     # Immunosuppression: Mycophenolate mofetil (dose 750 mg every 12 hours) and Methylprednisolone (dose taper)    - Induction with Recent Transplant:  Plan for rATG 1mg/kg daily for total 6mg/kg   - Changes: No. Tac held 2/2 DGF per liver transplant team. Plan to give rATG 6mg/kg total dose. Wean pred down to 5mg daily and then would attempt to wean further but would get leisa stim prior to starting because of length of time he has been on chronic prednisone    # Infection Prophylaxis:   - PJP: Sulfa/TMP (Bactrim)  - CMV: Valganciclovir (Valcyte), CMV IgG Ab  positive  - Thrush: Micafungin (Mycamine)  - Fungal: Micafungin (Mycamine)    # History of C.diff:   -Consider PO vanc with abx    # ANCA vasculitis:   -S/p Rituximab x2   -Obtain intermittent U/A     # Paroxysmal A-Fib: On coumadin and metoprolol ER 25 mg daily PTA. Consider switching to eliquis when kidney and liver are functioning.    -Increase metoprolol to 25mg q8h as I believe that improving HR will increase BP    # Blood pressure: Controlled, but low at times;  Goal BP: MAP > 65   - Volume status: Total body volume up, but intravascularly hypovolemic  EDW ~ 96.4 kg (on HD)   - Changes: Yes - Fluid removal increased to /hr as tolerated. Increase metoprolol tartrate 25 mg PO q8h and continue midodrine 2.5 mg PO TID.    # Elevated Blood Glucose: Glucose generally running ~ 160s-200   - Management as per primary team.  On insulin gtt.    # Anemia in Chronic Renal Disease and acute blood loss: Hgb: Stable, low      FEDERICO: No   - Iron studies: Unknown at this time, but checked with dialysis    # Mineral Bone Disorder:   - Secondary renal hyperparathyroidism; PTH level: Minimally elevated ( pg/ml)        On treatment: None  - Vitamin D; level: Not checked recently, but was normal last check        On supplement: No  - Calcium; level: Normal when corrected for low alb       On supplement: No  - Phosphorus; level: High  5.6      On binder: No    # Electrolytes:   - Potassium; level: High normal         On supplement: No  - Magnesium; level: High          On supplement: No, modulate with CRRT.  - Bicarbonate; level: Normal 22         On supplement: No  - Sodium; level: Low normal 135    # Gout:    - On prednisone 10 mg PO daily PTA. Wean prednisone down to 5mg daily and then will consider cortisol stimulation test    # Transplant History:  Etiology of Kidney Failure: IgA nephropathy and ANCA vasculitis  Tx: Liver Tx (ELY)  Transplant: 6/6/2023 (Liver), 6/6/2023 (Kidney)  Significant changes in  immunosuppression: None  Significant transplant-related complications: None    Recommendations were communicated to the primary team via this note and Epic message.    Seen and discussed with JADE Morrissey CNP   Pager: [phone]      Physician Attestation     I saw and evaluated Damion Quinones as part of a shared APRN/PA visit.     I personally reviewed the vital signs, medications, labs and imaging.    I personally performed the substantive portion of the medical decision making for this visit - please see the GEORGE's documentation for full details.    Key management decisions made by me and carried out under my direction: Increase pre filter and dialysate flows to 1400ml/hr each to increase clearance. If K continues to rise will switch to 2K bath. Increase metoprolol to 25mg q8h. Continue rATG 1mg/kg daily for total 6mg/kg to promote decreased time of DGF. Continue CRRT today due to low Bps. If BP improves tmrw can trial iHD.     I personally performed the substantive portion of the history for this visit - please see the GEORGE's documentation for full details.  Key additional history findings made by me: Pt confused today but following commands. BP remains low, HR remains 120s in Afib w/ RVR    Subhash Dutta MD  Date of Service (when I saw the patient): 06/09/23      Interval History   Mr. Quinones's creatinine is 1.61 (06/09 0413); Trend down on CRRT.  I/O last 3 completed shifts:  In: 2614.97 [P.O.:210; I.V.:962.97; NG/GT:500; IV Piggyback:442]  Out: 3561 [Urine:15; Drains:120; Other:3426]    Other significant labs/tests/vitals: Transaminases and total bili lower today.  SBP 90s-110 overnight. Afebrile. CVP 10  CXR 6/8 shows increased perihilar and basilar pulmonary opacities.  No acute events overnight.  Maintaining SpO2 on room air.  moderate leg swelling.  Nausea reported, but denies vomiting.  Bowel movements are loose.    Review of Systems   4 point ROS was obtained and negative except as  "noted in the Interval History.    MEDICATIONS:    aspirin  325 mg Oral Daily     B and C vitamin Complex with folic acid  5 mL Per Feeding Tube Daily     heparin ANTICOAGULANT  5,000 Units Subcutaneous Q12H     methocarbamol  500 mg Oral 4x Daily     methylPREDNISolone  50 mg Intravenous Once    Followed by     [START ON 6/10/2023] predniSONE  25 mg Oral Once    Followed by     [START ON 2023] predniSONE  10 mg Oral Once     metoprolol tartrate  25 mg Oral BID     micafungin  100 mg Intravenous Q24H     midodrine  2.5 mg Oral Q8H     mycophenolate  750 mg Oral BID IS     pantoprazole  40 mg Per Feeding Tube QAM AC     polyethylene glycol  17 g Oral Daily     protein modular  1 packet Per Feeding Tube TID     sennosides  8.6 mg Oral BID     sodium chloride (PF)  3 mL Intravenous Q8H     sulfamethoxazole-trimethoprim  1 tablet Oral Daily     ursodiol  250 mg Oral BID     valGANciclovir  450 mg Oral Daily    Or     valGANciclovir  450 mg Oral or NG Tube Daily       dextrose       dextrose       CRRT replacement solution 12.5 mL/kg/hr (23 0354)     insulin regular 1 Units/hr (23 0500)     NaCl 0.45 % 1,000 mL infusion       - MEDICATION INSTRUCTIONS -       CRRT replacement solution 200 mL/hr at 23 035     CRRT replacement solution 12.5 mL/kg/hr (23 0355)       Physical Exam   Temp  Av.9  F (36.6  C)  Min: 97  F (36.1  C)  Max: 99.2  F (37.3  C)  Arterial Line BP  Min: 60/45  Max: 131/80  Arterial Line MAP (mmHg)  Av mmHg  Min: 52 mmHg  Max: 101 mmHg      Pulse  Av.5  Min: 68  Max: 159 Resp  Av.8  Min: 14  Max: 23  FiO2 (%)  Av.3 %  Min: 40 %  Max: 50 %  SpO2  Av.1 %  Min: 92 %  Max: 100 %    CVP (mmHg): 6 mmHgBP 98/70   Pulse 119   Temp 97.6  F (36.4  C) (Oral)   Resp 12   Ht 1.702 m (5' 7\")   Wt 116.3 kg (256 lb 6.3 oz)   SpO2 94%   BMI 40.16 kg/m      Admit Weight: 102.3 kg (225 lb 8 oz)     GENERAL APPEARANCE: no distress  RESP: lungs clear to " auscultation - no rales, rhonchi or wheezes  CV: regular rhythm, normal rate, no rub, no murmur  EDEMA: moderate LE edema bilaterally  ABDOMEN: soft, nondistended, nontender, bowel sounds normal  MS: extremities normal - no gross deformities noted, no evidence of inflammation in joints, no muscle tenderness  SKIN: no rash  PSYCH: fatigued  DIALYSIS ACCESS:  Left IJ tunneled catheter    Data   All labs reviewed by me.  CMP  Recent Labs   Lab 06/09/23  0415 06/09/23  0413 06/09/23  0053 06/09/23  0003 06/08/23 2036 06/08/23 2027 06/08/23  1209 06/08/23  1204 06/08/23  0418 06/08/23  0415 06/07/23  0337 06/07/23  0330 06/06/23  2213 06/06/23  2108   NA  --  135*  --   --   --  139  --  139  --  138   < > 138   < > 142   POTASSIUM  --  5.0  --   --   --  4.6  --  4.6  --  4.6   < > 4.4   < > 5.0   CHLORIDE  --  102  --   --   --  109*  --  107  --  106   < > 105  --  107   CO2  --  22  --   --   --  19*  --  17*  --  21*   < > 17*  --  20*   ANIONGAP  --  11  --   --   --  11  --  15  --  11   < > 16*  --  15   * 131* 114* 116*   < > 125*   < > 158*   < > 113*   < > 167*   < > 195*   BUN  --  29.0*  --   --   --  28.2*  --  30.0*  --  28.0*   < > 32.0*  --  32.6*   CR  --  1.61*  --   --   --  1.44*  --  1.62*  --  1.84*   < > 2.76*  --  2.76*   GFRESTIMATED  --  47*  --   --   --  54*  --  47*  --  40*   < > 25*  --  25*   NAZARIO  --  8.2*  --   --   --  7.0*  --  7.5*  --  7.7*   < > 7.6*  --  8.6*   MAG  --  2.7*  --   --   --  2.4*  --  2.6*  --  2.8*   < > 2.1  --   --    PHOS  --  5.6*  --   --   --  4.9*  --  5.6*  --  5.5*   < > 4.7*  --   --    PROTTOTAL  --  4.6*  --   --   --   --   --   --   --  4.2*  --  3.4*  --  3.4*   ALBUMIN  --  2.4*  --   --   --  2.2*  --  2.3*  --  2.3*   < > 2.0*  --  2.1*   BILITOTAL  --  1.5*  --   --   --   --   --   --   --  1.7*  --  1.6*  --  1.7*   ALKPHOS  --  177*  --   --   --   --   --   --   --  107  --  81  --  99   AST  --  405*  --   --   --   --   --   --   --   816*  --  1,834*  --  1,276*   ALT  --  566*  --   --   --   --   --   --   --  692*  --  654*  --  624*    < > = values in this interval not displayed.     CBC  Recent Labs   Lab 06/09/23  0413 06/08/23  2027 06/08/23  1204 06/08/23  0415   HGB 8.1* 8.1* 8.5* 8.5*   WBC 10.9 10.1 13.5* 13.6*   RBC 2.71* 2.68* 2.79* 2.85*   HCT 25.9* 25.4* 26.5* 26.5*   MCV 96 95 95 93   MCH 29.9 30.2 30.5 29.8   MCHC 31.3* 31.9 32.1 32.1   RDW 17.7* 17.6* 17.8* 17.8*   PLT 52* 55* 61* 60*     INR  Recent Labs   Lab 06/09/23  0413 06/08/23  1619 06/08/23  0415 06/08/23  0013   INR 1.60* 2.04* 1.98* 1.96*   PTT 31 36 38 40*     ABG  Recent Labs   Lab 06/07/23  1637 06/07/23  1206 06/07/23  0757 06/07/23  0341   PH 7.43 7.44 7.46* 7.39   PCO2 33* 32* 31* 33*   PO2 71* 129* 127* 171*   HCO3 22 22 22 20*   O2PER 21 40 40 50      Urine Studies  Recent Labs   Lab Test 06/05/23  1620 04/07/23  1246 02/13/23  0743 01/23/23  0828   COLOR Yellow Yellow Yellow Light Yellow   APPEARANCE Clear Clear Clear Clear   URINEGLC Negative Negative Negative Negative   URINEBILI Negative Negative Negative Negative   URINEKETONE Negative Negative Negative Negative   SG 1.012 1.011 1.014 1.013   UBLD Moderate* Moderate* Negative Negative   URINEPH 5.5 5.5 5.0 6.0   PROTEIN 10* 10* 10* 20*   NITRITE Negative Negative Negative Negative   LEUKEST Negative Negative Negative Negative   RBCU 2 5* 1 <1   WBCU 7* 2 2 8*     No lab results found.  PTH  Recent Labs   Lab Test 05/19/23  0956   PTHI 67*     Iron Studies  Recent Labs   Lab Test 11/22/22  0848   IRON 68   *   IRONSAT 31   HOLA 429*       IMAGING:  All imaging studies reviewed by me.

## 2023-06-09 NOTE — PROGRESS NOTES
Immunosuppression Management Note:    Damion Quinones is a 67 year old male who is seen today  for immunosuppression management     I, Chad Clifton MD, I have examined the patient with our GEORGE/Fellow as part of a shared visit.   I participated in the rounds,  discussed and agree with the note and findings and  reviewed today's vital signs, medications, labs and imaging as noted in this note.  I  reviewed the  immunosuppression medications.  I personally provided a substantive portion of the care of this patient. I personally performed the immunosuppressive management of this patient, reviewed the overall  immunosuppression including drug levels, allograft function and provided the recommendations to adjust the dose to provide optimal levels to prevent rejection of the allograft and prevent toxicity to the organs. This was complex care due to the fresh allograft.   Time spent: evaluating patient, examining patient, discussion of plan, counseling and documentation: >35 min   I spoke to the patient/family and explained below clinical details and answered all the questions    Transplant Surgery  Inpatient Daily Progress Note  06/09/2023    Assessment & Plan:   67 year old male with PMHx of decompensated alcohol + hemochromatosis (homozygous H63D) cirrhosis complicated by variceal bleeding s/p TIPS (10/1/2022) and hepatic encephalopathy + CKD (IgA nephropathy + ANCA vasculitis) s/p simultaneous liver kidney transplant on 6/6/2023. RTOR on 6/6/203 with concern for low flow in the renal graft - ex-lap, washout, closure with healthy appearing graft with intact arterial and venous flow.     s/p DBD simultaneous liver kidney transplant on 6/6/2023 with complex reconstruction of accessory right hepatic artery. POD #3  * RTOR on 6/6/203 with concern for low flow in the renal graft - ex-lap, washout, closure with healthy appearing graft with intact arterial and venous flow.     Alkaline phosphatase up slightly to 177, but  total bilirubin down slightly to 1.5. AST and ALT downtrending to 405 and 566 respectively. Lactate normal. INR improving.   Liver US w/doppler 2023 - low waveforms and low resistive indices, but stable velocities and upstrokes.   Stent: No biliary stent was placed  Drains: 1 MAE drain in place + prevena wound vac  - ASA 325mg daily   - Ursodiol 250mg BID    Immunosuppression  - Thymoglobulin: s/p 1mg/kg on , , , . Plan for 1mg/kg daily for a total of 6mg/kg; last day 2023.  - Steroid taper per protocol  - Restart tacrolimus on 2023, goal 5-8  - MMF 750mg BID    H/o CKD related IgA nephropathy and ANCA vasculitis S/p  donor renal transplant on 2023 with ureteral/J stent placement  Renal US 2023: patent renal transplant vasculature. Nephrology following.  - Midodrine increased to 5mg q8h   - Continue CRRT with goal removal: 100cc/hr  : Lux to remain in place at this time for close urinary output monitoring    Hematology:   Leukocytosis, RESOLVED: Leukocytosis with neurophilic predominance post-transplant, downtrending. Afebrile. No symptoms or signs of infection. Likely related to high dose steroids.  Acute on chronic anemia: Hgb 7-9 in the setting of post-operative blood loss. No overt s/sx of blood loss.   Thrombocytopenia: Platelet count: 52  Coagulopathy: INR 1.60    Cardiac:   Coronary artery disease: Continue ASA 81mg daily.    * Recommend statin resumption in the outpatient setting.     Paroxysmal atrial fibrillation:    * Increase metoprolol to 25mg q8h given ongoing rapid rates   * When INR normalizes, will plan to start heparin gtt    * Per Dr. Clifton: consider cardiology consultation    Respiratory:  Acute hypoxic respiratory failure in the setting of post-operative state, RESOLVED: Extubated on  to supplemental O2, now breathing comfortably on ambient air.   * Continue pulmonary toilet and incentive spirometry    GI/Nutrition:   Hepatitis B s Ag +:  Noted to be positive pre-transplant on 6/5/23, but not previously positive (1/26/2023). HBV DNA negative on 6/5/23. No clear at risk behavior.    * Follow up repeat: Hepatitis B s Ag and hepatitis B DNA    Mild protein calorie malnutrition: Naso-jejunal tube in place, enteral feeding per nutrition + regular. Plan for calorie counts.     Bowel regimen: Miralax 17g daily + senna docuate BID    Endocrine:   Post-operative elevated blood glucoses: HgA1c 6.1% (6/6/2023)  - Continue Insulin gtt    Rheumatology:  Psoriatic arthritis: Prior to admission was on prednisone 10mg daily, on prednisone taper as above.   - Goal to discharge on prednisone 5mg; may consider cortisol stimulation test given long term steroid use    Neurologic  Acute post operative pain:    * Methocarbamol 500mg QID    * Oxycodone 2.5 to 5mg q4h PRN    Deconditioning/Weakness: OT and PT optimization     Alcohol use disorder: Continue sober supports post transplant. Continue complete sobriety.    Prophylaxis: Mechanical DVT ppx , fall, GI (PPI BID), fungal (micafungin), CMV (valganciclovir, CMV IgG Ab +), PJP (bactrim), zosyn x 48 hours.     Disposition: Ongoing MICU cares given CRRT    GEORGE/Fellow/Resident Provider: Lolis Bermudez MD     Faculty: Dr. Clifton    __________________________________________________________________  Transplant History:  Transplants:6/6/2023 (Liver), 6/6/2023 (Kidney)   Postoperative day: 3 (Liver), 3 (Kidney)     Interval History:   - Overnight events: Ongoing rapid rates ~120 in the setting of atrial fibrillation  - Breathing comfortably on ambient air  - Pain controlled with PRN meds. Mild nausea.  - Ongoing CRRT with minimal urine output  - Enteral feeding via NJ tube + PO intake  - Feeling discouraged by perceived lack of progress    ROS:   A 10-point review of systems was negative except as noted above.    Curent Meds:   acetaminophen  650 mg Oral Once    anti-thymocyte globulin  1 mg/kg (Dosing  "Weight) Intravenous Central line Once    aspirin  325 mg Oral Daily    B and C vitamin Complex with folic acid  5 mL Per Feeding Tube Daily    diphenhydrAMINE  25-50 mg Oral Once    Or    diphenhydrAMINE  25-50 mg Per NG tube Once    heparin ANTICOAGULANT  5,000 Units Subcutaneous Q12H    methocarbamol  500 mg Oral 4x Daily    metoprolol tartrate  25 mg Oral Q8H    micafungin  100 mg Intravenous Q24H    midodrine  5 mg Oral Q8H    mycophenolate  750 mg Oral BID IS    pantoprazole  40 mg Per Feeding Tube QAM AC    polyethylene glycol  17 g Oral Daily    [START ON 6/10/2023] predniSONE  25 mg Oral Once    Followed by    [START ON 6/11/2023] predniSONE  10 mg Oral Once    protein modular  1 packet Per Feeding Tube TID    sennosides  8.6 mg Oral BID    sodium chloride (PF)  3 mL Intravenous Q8H    sulfamethoxazole-trimethoprim  1 tablet Oral Daily    ursodiol  250 mg Oral BID    valGANciclovir  450 mg Oral Daily    Or    valGANciclovir  450 mg Oral or NG Tube Daily       Physical Exam:   Admit Weight: 102.3 kg (225 lb 8 oz)    Current Vitals:   BP (!) 72/49   Pulse 103   Temp 99  F (37.2  C) (Oral)   Resp 12   Ht 1.702 m (5' 7\")   Wt 113.4 kg (250 lb)   SpO2 94%   BMI 39.16 kg/m      Vital sign ranges:    Temp:  [97.5  F (36.4  C)-99  F (37.2  C)] 99  F (37.2  C)  Pulse:  [] 103  Resp:  [10-26] 12  BP: (72-98)/(49-85) 72/49  Cuff Mean (mmHg):  [83] 83  MAP:  [68 mmHg-235 mmHg] 235 mmHg  Arterial Line BP: ()/() 235/235  SpO2:  [91 %-97 %] 94 %  Patient Vitals for the past 24 hrs:   BP Temp Temp src Pulse Resp SpO2 Weight   06/09/23 0945 (!) 72/49 -- -- 103 -- 94 % --   06/09/23 0930 -- -- -- 108 -- 93 % --   06/09/23 0915 -- -- -- 108 -- 93 % --   06/09/23 0900 (!) 82/66 99  F (37.2  C) Oral 112 12 91 % --   06/09/23 0845 -- -- -- 111 -- 91 % --   06/09/23 0830 -- -- -- 106 -- 93 % --   06/09/23 0815 (!) 82/62 -- -- (!) 122 -- 92 % --   06/09/23 0800 (!) 78/60 98.7  F (37.1  C) Oral 115 10 94 % " "--   06/09/23 0745 -- -- -- 120 -- 93 % --   06/09/23 0730 -- -- -- (!) 121 -- 94 % --   06/09/23 0715 -- -- -- (!) 132 -- 96 % --   06/09/23 0700 96/76 -- -- (!) 124 -- 96 % --   06/09/23 0645 -- -- -- (!) 127 -- 94 % --   06/09/23 0630 -- -- -- 110 -- 93 % --   06/09/23 0615 -- -- -- 117 -- 91 % --   06/09/23 0600 98/85 -- -- 119 10 95 % 113.4 kg (250 lb)   06/09/23 0545 -- -- -- 104 -- 92 % --   06/09/23 0530 -- -- -- 117 -- 94 % --   06/09/23 0515 -- -- -- 107 -- 94 % --   06/09/23 0500 98/70 -- -- 119 12 94 % --   06/09/23 0445 -- -- -- 111 -- 93 % --   06/09/23 0430 -- -- -- 120 -- 93 % --   06/09/23 0415 -- -- -- 113 -- 92 % --   06/09/23 0400 97/51 97.6  F (36.4  C) Oral (!) 134 12 93 % --   06/09/23 0345 93/58 -- -- (!) 128 -- 94 % --   06/09/23 0330 -- -- -- (!) 121 -- 96 % --   06/09/23 0315 -- -- -- 110 -- 93 % --   06/09/23 0300 -- -- -- 106 12 97 % --   06/09/23 0200 -- -- -- 108 14 95 % --   06/09/23 0100 -- -- -- (!) 126 12 95 % --   06/09/23 0000 -- 97.9  F (36.6  C) Oral (!) 126 12 96 % --   06/08/23 2300 -- -- -- 119 12 95 % --   06/08/23 2200 98/80 -- -- (!) 125 16 95 % --   06/08/23 2100 -- -- -- 119 12 93 % --   06/08/23 2000 -- 97.6  F (36.4  C) Oral (!) 121 12 96 % --   06/08/23 1900 -- -- -- 119 -- 96 % --   06/08/23 1800 -- -- -- (!) 122 12 94 % --   06/08/23 1700 -- -- -- 103 11 94 % --   06/08/23 1600 -- 97.5  F (36.4  C) Axillary 120 20 95 % --   06/08/23 1500 -- -- -- 110 16 96 % --   06/08/23 1400 -- -- -- 95 26 93 % --   06/08/23 1300 -- -- -- 106 15 95 % --   06/08/23 1200 -- 97.5  F (36.4  C) Axillary 102 14 95 % --     VS:  BP (!) 72/49   Pulse 103   Temp 99  F (37.2  C) (Oral)   Resp 12   Ht 1.702 m (5' 7\")   Wt 113.4 kg (250 lb)   SpO2 94%   BMI 39.16 kg/m      Wt:   Wt Readings from Last 2 Encounters:   06/09/23 113.4 kg (250 lb)   05/19/23 103.4 kg (228 lb)      Constitutional: Alert, awake  Eyes: Conjunctiva anicteric  CV: NSR, no murmurs  Respiratory: breathing " comfortably on ambient air  Abd: Obese. Surgical scar covered with prevena. + MAE drain w/serosang drainage.   Skin: No jaundice  MSK: 2+ Mima edema bilaterally  Neuro: Alert and oriented x 3  - HD access: RIJ tunneled catehter    Data:   CMP  Recent Labs   Lab 06/09/23  0932 06/09/23  0833 06/09/23 0415 06/09/23 0413 06/08/23 2036 06/08/23 2027 06/08/23 0418 06/08/23 0415 06/07/23 0337 06/07/23  0330 06/06/23  0547 06/05/23  1605   NA  --   --   --  135*  --  139   < > 138   < > 138   < > 139   POTASSIUM  --   --   --  5.0  --  4.6   < > 4.6   < > 4.4   < > 4.1   CHLORIDE  --   --   --  102  --  109*   < > 106   < > 105   < > 105   CO2  --   --   --  22  --  19*   < > 21*   < > 17*   < > 21*   * 147*   < > 131*   < > 125*   < > 113*   < > 167*   < > 101*   BUN  --   --   --  29.0*  --  28.2*   < > 28.0*   < > 32.0*   < > 36.9*   CR  --   --   --  1.61*  --  1.44*   < > 1.84*   < > 2.76*   < > 3.85*   GFRESTIMATED  --   --   --  47*  --  54*   < > 40*   < > 25*   < > 17*   NAZARIO  --   --   --  8.2*  --  7.0*   < > 7.7*   < > 7.6*   < > 8.7*   ICAW  --   --   --  4.6  --  4.3*   < > 4.3*   < >  --    < >  --    MAG  --   --   --  2.7*  --  2.4*   < > 2.8*   < > 2.1   < > 2.2   PHOS  --   --   --  5.6*  --  4.9*   < > 5.5*   < > 4.7*   < > 3.4   AMYLASE  --   --   --   --   --   --   --   --   --  90  --  153*   LIPASE  --   --   --   --   --   --   --   --   --  59  --   --    ALBUMIN  --   --   --  2.4*  --  2.2*   < > 2.3*   < > 2.0*   < > 2.9*   BILITOTAL  --   --   --  1.5*  --   --   --  1.7*  --  1.6*   < > 0.6   ALKPHOS  --   --   --  177*  --   --   --  107  --  81   < > 207*   AST  --   --   --  405*  --   --   --  816*  --  1,834*   < >  --    ALT  --   --   --  566*  --   --   --  692*  --  654*   < > 84*    < > = values in this interval not displayed.     CBC  Recent Labs   Lab 06/09/23  0413 06/08/23 2027 06/06/23  2315 06/06/23 2108   HGB 8.1* 8.1*   < > 9.1*   WBC 10.9 10.1   < > 12.3*    PLT 52* 55*   < > 114*   A1C  --   --   --  5.1    < > = values in this interval not displayed.     COAGS  Recent Labs   Lab 06/09/23  0413 06/08/23  1619   INR 1.60* 2.04*   PTT 31 36

## 2023-06-09 NOTE — PROCEDURES
Small Bowel Feeding Tube Reposition  Reason for Feeding Tube Reposition: Feeding tube retracted / gastric in pt with nausea  Cortrak Start Time: 1130  Cortrak End Time: 1230  Medicine Delivered During Procedure: 2% viscous lidocaine gel  and metoclopramide (reglan) injection  Reposition Successful: No, suspected gastric pending AXR   Procedure Complications: nausea relieved with zofran  Final Placement Vamshi at exit of nare: 100 cm  Face to Face time with patient: 60 minutes  Pending AXR, will release order to radiology      Rekha Mcdonough RD, LD, CNSC  [address] pager: [phone]  Ascom: [phone]  Weekend/Holiday RD pager [phone]

## 2023-06-09 NOTE — PLAN OF CARE
Problem: Liver Transplant  Goal: Optimal Coping with Organ Transplant  Outcome: Progressing  Intervention: Optimize Psychosocial Response  Recent Flowsheet Documentation  Taken 6/9/2023 1600 by Abdias Brennan, RN  Family/Support System Care: caregiver stress acknowledged  Taken 6/9/2023 1200 by Abdias Brennan, RN  Family/Support System Care: caregiver stress acknowledged  Taken 6/9/2023 0800 by Abdias Brennan, RN  Family/Support System Care: caregiver stress acknowledged   Goal Outcome Evaluation:         D. Color improved less jaundice- cont very edematous, ( anasarca 4+ pitting) . Moves all extremities - up in chair - uses IS to 750-100. Cont on CRRT. Pulling 75cc/hr-  BP low 80-90-/ 40-50s  hr a fib rate 120-108 ,  denies pain -   A labs abnormal slowly tranding improvement-   I cont with plan - increase activity if able- CRRT to pull fluid. No urine output this shift.

## 2023-06-09 NOTE — PROGRESS NOTES
"CRRT STATUS NOTE    DATA:  Time:  5:02 AM  Pressures WNL: Yes  Filter Status: WDL    Problems Reported/Alarms Noted:  None    Supplies Present: Yes    ASSESSMENT:  Patient Net Fluid Balance:  Positive 9 L since admit, negative 30 ml since midnight  Vital Signs: BP 98/80 (BP Location: Right arm)   Pulse 106   Temp 97.9  F (36.6  C) (Oral)   Resp 12   Ht 1.702 m (5' 7\")   Wt 116.3 kg (256 lb 6.3 oz)   SpO2 97%   BMI 40.16 kg/m    Labs: K+ 5.0, Cr 1.61, ICA 4.6, Hgb 8.1  Goals of Therapy:  0-50 ml/hr to keep off pressors and MAP > 65    INTERVENTIONS:   None    PLAN:  Continue current plan of care. Remove fluid as pt tolerates.  Transition to HD possibly today.  Notify CRRT RN with questions or concerns.    "

## 2023-06-09 NOTE — PROGRESS NOTES
SURGICAL ICU PROGRESS NOTE  2023      PRIMARY TEAM: Transplant Surgery  PRIMARY PHYSICIAN: Dr. Clifton  REASON FOR CRITICAL CARE ADMISSION: Close hemodynamic monitoring, mechanical ventilation, CRRT  ADMITTING PHYSICIAN: Dr. 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 PMHx also includes coronary artery disease, alcohol overuse, obesity, ESRD on HD -- (IgA nephropathy + ANCA vasculitis), psoriatic arthritis, paroxysmal atrial fibrillation (on warfarin), DVT, recurrent c diff, and HTN.  He is now s/p  donor liver and kidney transplant on 23 with Dr. Clifton.  Intraop:   ml  500 ml Cell saver returned  4 uPRBC  2uPlt  1 uFFP  5L crystalloid  UOP 40cc prior to kidney, 90cc post kidney    PLAN:    Updates today (2023)  - Continue CRRT per transplant neprhology, plan to pull fluid /hr as tolerated  - Increase midodrine to 5mg TID  - metoprolol 25mg  frequency increased to q8h by transplant team    Neurological:  # Acute post-surgical pain   # Hx Alcohol use disorder, sober since 2016  - Monitor neurological status. Delirium preventions and precautions  - Sedation plan: None indicated  - Pain control: fentanyl gtt discontinued with extubation. Hold tylenol for now per transplant until LFTS are trending down consistently. PRN oxycodone 5-10mg q4h, robaxin 500 QID    Pulmonary:   # Post operative ventilatory support, resolved  # Acute hypoxic respiratory failure requiring mechanical ventilation, resolved  - Successfully extubated  to NC, saturating well on room air past couple days  - Aggressive pulmonary hygiene, IS, encourage OOB  - Supplemental O2 as needed to maintain SpO2 >  92%    Cardiovascular:    #Hx pAfib on PTA warfarin  # Afib with RVR, improving  #Hx CAD  #Hx HTN  #Hypotension, orthostatic  #Shock-distributive vs hypovolemic, resolved  - ECHO 23: afib, LVEF 55-60%  - CVP goal 8-12  - MAP goal 60-85, SBP goal 110-160. Off pressor since about .   - PTA metoprolol succinate ER 25 daily,  started metoprolol 12.5 BID ,  increased to 25mg BID. Increased to 25mg q8h by transplant team.   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 NJ placed , TF at goal  - PPI   - Having bowel function, continue bowel regimen  - Ongoing nausea, okay to continue regular diet, patient to self-regulate    Renal/ Fluids/Electrolytes:   #Lactic acidosis, resolved  # Hx ESRD on HD MWF (IgA nephropathy + ANCA vasculitis) now s/p DDKT 23  # Delayed graft function  - Expect some delayed graft function with  donor kidney, thought still making minimal urine  - Continue CRRT per transplant nephrology, pull 50-100ml/hr as tolerated today  - transplant nephrology consulted and following  - Lux catheter to remain in place until at least POD5 per transplant surgery      Endocrine:  # Stress hyperglycemia   -Insulin drip for blood glucose management, continue today while advancing TF and still on steroid taper. Goal to keep  BG< 180 for optimal wound healing      ID:  #Stress leukocytosis, improving  #Post-transplant ppx per transplant  - WBC 10.9 from 13.6   -Perioperative antibiotics: Zosyn x 48 hrs (completed), micafungin x 14 days  -Immunosuppression per transplant: thymoglobulin, tacrolimus 3mg BID (currently held), methylprednisolone taper  -Prophylactic regimen: Valganciclovir, Bactrim Ppx    Heme:     # Acute blood loss anemia   # Anemia of critical illness and chronic renal disease  # Hx hereditary hemochromatosis   # Thrombocytopenia 2/2 liver dysfunction  - Transfusion goals: INR <2, Platelets > 20, Fibrinogen > 200, Hgb >8.0  - Hgb stable 8.1, Plt 52 (76), INR 1.6. No current indication for correction.     Rheumatologic:  #Hx Gout  - hold PTA prednisone 10mg daily    Musculoskeletal:  # Weakness and deconditioning of critical illness   - Physical and occupational therapy consult, appreciate recommendations. Encourage increased time OOB.     Skin:  -Diligent skin care to prevent breakdown    General Cares/Prophylaxis:    DVT Prophylaxis: SQH, SCDs  GI Prophylaxis: None indicated  Restraints: Restraints for medical healing needed: NO    Lines/ tubes/ drains:  -PIVs  -PTA Left tunneled IJ dilaysis line   -Right internal jugular triple lumen CVC   -Lux catheter  -Intraabdominal MAE drainx1   -incisional wound vac (kidney incision)    Disposition:  - Surgical ICU  - Barriers to leaving ICU: CRRT    Discussed with surgical ICU staff, Dr. Brower- Gely Peña MD  General Surgery PGY-2      - - - - - - - - - - - - - - - - - - - - - - - - - - - - - - - - - - - - - - - - - - - - - -   INTERVAL EVENTS/SUBJECTIVE: No acute events. Pain well controlled. Ongoing nausea this morning.       PHYSICAL EXAMINATION:  Temp:  [97.5  F (36.4  C)-97.9  F (36.6  C)] 97.6  F (36.4  C)  Pulse:  [] 119  Resp:  [10-26] 10  BP: (97-98)/(51-85) 98/85  Cuff Mean (mmHg):  [83] 83  MAP:  [68 mmHg-97 mmHg] 82 mmHg  Arterial Line BP:  ()/(56-92) 92/76  SpO2:  [92 %-97 %] 95 %     General: NAD, alert and interactive, sitting up in chair  HEENT: NJ in place  Neck: Right CVC  Pulm/Resp: Breathing non-labored on RA, equal chest rise  CV: irregularly irregular rhythm on monitor, tachycardic to low 100s  Abdomen: soft, chevron incision covered with dressing with some serosang drainage, RLQ prevena holding suctoin, MAE x 1 to bulb suction with serosanguinous output, soft reducible umbilical hernia  : stauffer catheter in place with small amount of dark urine output, gross hematuria  MSK/Extremities: peripheral edema, peripheral pulses intact, extremities well perfused, SCDs in place  Neuro: PERRL, alert and oriented, speech clear, no gross deficits appreciated    LABS: Reviewed.   Arterial Blood Gases   Recent Labs   Lab 06/07/23  1637 06/07/23  1206 06/07/23  0757 06/07/23  0341   PH 7.43 7.44 7.46* 7.39   PCO2 33* 32* 31* 33*   PO2 71* 129* 127* 171*   HCO3 22 22 22 20*     Complete Blood Count   Recent Labs   Lab 06/09/23  0413 06/08/23 2027 06/08/23  1204 06/08/23  0415   WBC 10.9 10.1 13.5* 13.6*   HGB 8.1* 8.1* 8.5* 8.5*   PLT 52* 55* 61* 60*     Basic Metabolic Panel  Recent Labs   Lab 06/09/23  0415 06/09/23  0413 06/09/23  0053 06/09/23  0003 06/08/23 2036 06/08/23 2027 06/08/23  1209 06/08/23  1204 06/08/23  0418 06/08/23  0415   NA  --  135*  --   --   --  139  --  139  --  138   POTASSIUM  --  5.0  --   --   --  4.6  --  4.6  --  4.6   CHLORIDE  --  102  --   --   --  109*  --  107  --  106   CO2  --  22  --   --   --  19*  --  17*  --  21*   BUN  --  29.0*  --   --   --  28.2*  --  30.0*  --  28.0*   CR  --  1.61*  --   --   --  1.44*  --  1.62*  --  1.84*   * 131* 114* 116*   < > 125*   < > 158*   < > 113*    < > = values in this interval not displayed.     Liver Function Tests  Recent Labs   Lab 06/09/23  0413 06/08/23  2027 06/08/23  1619 06/08/23  1204 06/08/23  0415 06/08/23  0013 06/07/23  0758 06/07/23  0330  06/06/23  2108   *  --   --   --  816*  --   --  1,834* 1,276*   *  --   --   --  692*  --   --  654* 624*   ALKPHOS 177*  --   --   --  107  --   --  81 99   BILITOTAL 1.5*  --   --   --  1.7*  --   --  1.6* 1.7*   ALBUMIN 2.4* 2.2*  --  2.3* 2.3*  --    < > 2.0* 2.1*   INR 1.60*  --  2.04*  --  1.98* 1.96*   < > 2.02* 2.17*    < > = values in this interval not displayed.     Pancreatic Enzymes  Recent Labs   Lab 06/07/23  0330 06/05/23  1605   LIPASE 59  --    AMYLASE 90 153*     Coagulation Profile  Recent Labs   Lab 06/09/23  0413 06/08/23  1619 06/08/23  0415 06/08/23  0013   INR 1.60* 2.04* 1.98* 1.96*   PTT 31 36 38 40*       IMAGING:  Recent Results (from the past 24 hour(s))   XR Chest Port 1 View    Narrative    EXAM: XR CHEST PORT 1 VIEW  6/8/2023 4:33 PM      HISTORY: R IJ rewired    COMPARISON: Radiograph 6/6/2023, 623.    FINDINGS: Single view of the chest. Enteric tube courses outside the  field of view and appears to be coiled within the fundus of the  stomach. Stable positioning of left IJ dual-lumen CVC with tip  projecting over the right atrium. Right internal jugular central  venous catheter with tip projecting over the low SVC.    Trachea is midline. Enlarged cardiomediastinal silhouette. Small  bilateral pleural effusions. No pneumothorax. Mixed perihilar and  bibasilar pulmonary opacities      Impression    IMPRESSION:   1. Right internal jugular central venous catheter with tip projecting  over the low SVC. No pneumothorax.  2. Perihilar and bibasilar pulmonary opacities, increased compared to  6/6/2023 radiograph. Findings likely increased atelectasis/edema.  3. Small bilateral pleural effusions.    I have personally reviewed the examination and initial interpretation  and I agree with the findings.    HEBERT MAYERS MD         SYSTEM ID:  [id number]

## 2023-06-09 NOTE — PROGRESS NOTES
Patient seen and examined with our transplant team.  Patient states that he is not feeling well this morning.  No fevers.    Examination of the abdomen wound looks healthy on the liver side and the kidney wound there is a wound VAC present.    Reviewed the laboratory values    Liver allograft no rejection or technical problems    Kidney has delayed graft function    Recommendations:    #1 please consult cardiology so that we could better manage his cardiac status and perfuse the kidney, so that the delayed graft function would improve.  #2 please obtain blood cultures since he is off antibiotics to look for any infection  #3 for please consider PT OT consult and a possible antidepressant to improve his mood.      I conveyed the above recommendations to our SICU resident.

## 2023-06-09 NOTE — PROGRESS NOTES
Care Management Follow Up    Length of Stay (days): 3    Expected Discharge Date: 06/12/2023     Concerns to be Addressed:  Outpatient communication     Patient plan of care discussed at interdisciplinary rounds: Yes    Anticipated Discharge Disposition:  Not addressed at this time   Anticipated Discharge Services:  TBD  Anticipated Discharge DME:      Patient/family educated on Medicare website which has current facility and service quality ratings:    Education Provided on the Discharge Plan:    Patient/Family in Agreement with the Plan:      Referrals Placed by CM/SW:    Private pay costs discussed: Not applicable    Additional Information:  In 4A Discharge Rounds it was reported pt on CRT, not making urine, soft BPs, forgetful.   11:14am:  Received call from Molly Castro at [facility] in [place]. She reported having Hep B results. She spoke with pt's nurse & the 4A Charge Nurse. Molly asking to speak with the provider. Called Kim Poe NP and asked her to speak with Molly at [facility].   Molly Castro  [facility]  Phone: [phone]      Lia Dailey, LORIN Care Coordinator  [facility]  On 6- RNCC coverage for Unit 4C & Unit 4A (non-neuro, non-ENT, non-cardiology). Call [phone].

## 2023-06-09 NOTE — PROGRESS NOTES
"CRRT STATUS NOTE    DATA:  Time:  6:52 PM  Pressures WNL:  YES  Filter Status:  WDL    Problems Reported/Alarms Noted:  Filter clotted this AM @07:53    Supplies Present:  YES    ASSESSMENT:  Patient Net Fluid Balance:  Net -393.4mL since midnight.    Vital Signs:  BP 95/68   Pulse 96   Temp 97.8  F (36.6  C) (Axillary)   Resp 16   Ht 1.702 m (5' 7\")   Wt 113.4 kg (250 lb)   SpO2 99%   BMI 39.16 kg/m      Labs:        Lab Results   Component Value Date     06/09/2023    Lab Results   Component Value Date    CHLORIDE 104 06/09/2023    Lab Results   Component Value Date    BUN 32.2 06/09/2023      Lab Results   Component Value Date    POTASSIUM 5.1 06/09/2023    POTASSIUM 3.7 06/07/2023    POTASSIUM 4.6 08/27/2019    Lab Results   Component Value Date    CO2 22 06/09/2023    Lab Results   Component Value Date    CR 1.59 06/09/2023    CR 1.11 08/27/2019        Lab Results   Component Value Date    HGB 7.7 (L) 06/09/2023       Goals of Therapy: UF 50-100cc/hr as tolerated to remain off pressors with MAP>65    INTERVENTIONS:   New filter and re-started @9:40    PLAN:  Continue CRRT until patient can tolerate HD. Follow POC and consult renal if needed - he said he was OK with K 5.3 or less for now.  Contact CRRT Resource 1 with any questions.    "

## 2023-06-09 NOTE — PLAN OF CARE
Major Shift Events: Patient alert and oriented, but forgetful. Moves all 4 extremities purposefully. On room air, breathing unlabored. Denies pain, but does occasionally state he has nausea. Refuses medication. Remained in A fib throughout entire shift, rate from 100 to 130. Maps in the 70 and 80's requiring no pressors. Patient has 3+ edema on extremities and skin is beginning to weep. Difficult to locate LUE pulses, doppler used. Small incontinent bowel movement and very little urine output, only 1 or 2 mL. Bladder scanned at 0600 with 7mL. Crrt ran without incident. Currently -250 mL down for the day. NJ in place with TF increased to goal of 40 at 10pm, standard free water flushes. A Line removed, R internal jugular CVC in place. Insulin drip continues at 1u/hr throughout entire shift.   Plan: Advance diet, Increase activity, Transition to subcutaneous insulin.   For vital signs and complete assessments, please see documentation flowsheets.

## 2023-06-10 ENCOUNTER — APPOINTMENT (OUTPATIENT)
Dept: GENERAL RADIOLOGY | Facility: CLINIC | Age: 68
End: 2023-06-10
Attending: INTERNAL MEDICINE
Payer: COMMERCIAL

## 2023-06-10 ENCOUNTER — APPOINTMENT (OUTPATIENT)
Dept: CT IMAGING | Facility: CLINIC | Age: 68
End: 2023-06-10
Attending: INTERNAL MEDICINE
Payer: COMMERCIAL

## 2023-06-10 ENCOUNTER — APPOINTMENT (OUTPATIENT)
Dept: CARDIOLOGY | Facility: CLINIC | Age: 68
End: 2023-06-10
Attending: INTERNAL MEDICINE
Payer: COMMERCIAL

## 2023-06-10 PROBLEM — Z76.82 ORGAN TRANSPLANT CANDIDATE: Status: RESOLVED | Noted: 2023-04-27 | Resolved: 2023-06-10

## 2023-06-10 PROBLEM — Z79.2 ADMINISTRATION OF LONG-TERM PROPHYLACTIC ANTIBIOTICS: Status: ACTIVE | Noted: 2023-06-10

## 2023-06-10 PROBLEM — B95.2 BACTEREMIA DUE TO ENTEROCOCCUS: Status: ACTIVE | Noted: 2023-06-09

## 2023-06-10 PROBLEM — R78.81 BACTEREMIA DUE TO ENTEROCOCCUS: Status: ACTIVE | Noted: 2023-06-09

## 2023-06-10 LAB
ABO/RH(D): NORMAL
ALBUMIN SERPL BCG-MCNC: 2.8 G/DL (ref 3.5–5.2)
ALBUMIN UR-MCNC: 100 MG/DL
ALLEN'S TEST: ABNORMAL
ALP SERPL-CCNC: 198 U/L (ref 40–129)
ALT SERPL W P-5'-P-CCNC: 450 U/L (ref 10–50)
AMORPH CRY #/AREA URNS HPF: ABNORMAL /HPF
ANION GAP SERPL CALCULATED.3IONS-SCNC: 11 MMOL/L (ref 7–15)
ANTIBODY SCREEN: NEGATIVE
APPEARANCE UR: ABNORMAL
APTT PPP: 28 SECONDS (ref 22–38)
APTT PPP: 28 SECONDS (ref 22–38)
AST SERPL W P-5'-P-CCNC: 186 U/L (ref 10–50)
BASE EXCESS BLDA CALC-SCNC: -1.1 MMOL/L (ref -9–1.8)
BASE EXCESS BLDV CALC-SCNC: -2.3 MMOL/L (ref -7.7–1.9)
BASOPHILS # BLD MANUAL: 0 10E3/UL (ref 0–0.2)
BASOPHILS NFR BLD MANUAL: 0 %
BILIRUB DIRECT SERPL-MCNC: 1.69 MG/DL (ref 0–0.3)
BILIRUB SERPL-MCNC: 2.1 MG/DL
BILIRUB UR QL STRIP: NEGATIVE
BLD PROD TYP BPU: NORMAL
BLOOD COMPONENT TYPE: NORMAL
BUN SERPL-MCNC: 31.4 MG/DL (ref 8–23)
CA-I BLD-MCNC: 4.5 MG/DL (ref 4.4–5.2)
CALCIUM SERPL-MCNC: 8 MG/DL (ref 8.8–10.2)
CHLORIDE SERPL-SCNC: 102 MMOL/L (ref 98–107)
CODING SYSTEM: NORMAL
COLOR UR AUTO: ABNORMAL
CREAT SERPL-MCNC: 1.42 MG/DL (ref 0.67–1.17)
CROSSMATCH: NORMAL
DEPRECATED HCO3 PLAS-SCNC: 22 MMOL/L (ref 22–29)
ENTEROCOCCUS FAECALIS: NOT DETECTED
ENTEROCOCCUS FAECIUM: DETECTED
EOSINOPHIL # BLD MANUAL: 0 10E3/UL (ref 0–0.7)
EOSINOPHIL NFR BLD MANUAL: 0 %
ERYTHROCYTE [DISTWIDTH] IN BLOOD BY AUTOMATED COUNT: 17.4 % (ref 10–15)
ERYTHROCYTE [DISTWIDTH] IN BLOOD BY AUTOMATED COUNT: 17.4 % (ref 10–15)
ERYTHROCYTE [DISTWIDTH] IN BLOOD BY AUTOMATED COUNT: 17.5 % (ref 10–15)
FIBRINOGEN PPP-MCNC: 422 MG/DL (ref 170–490)
FIBRINOGEN PPP-MCNC: 436 MG/DL (ref 170–490)
GFR SERPL CREATININE-BSD FRML MDRD: 55 ML/MIN/1.73M2
GLUCOSE BLDC GLUCOMTR-MCNC: 115 MG/DL (ref 70–99)
GLUCOSE BLDC GLUCOMTR-MCNC: 119 MG/DL (ref 70–99)
GLUCOSE BLDC GLUCOMTR-MCNC: 127 MG/DL (ref 70–99)
GLUCOSE BLDC GLUCOMTR-MCNC: 129 MG/DL (ref 70–99)
GLUCOSE BLDC GLUCOMTR-MCNC: 132 MG/DL (ref 70–99)
GLUCOSE BLDC GLUCOMTR-MCNC: 139 MG/DL (ref 70–99)
GLUCOSE BLDC GLUCOMTR-MCNC: 141 MG/DL (ref 70–99)
GLUCOSE BLDC GLUCOMTR-MCNC: 142 MG/DL (ref 70–99)
GLUCOSE BLDC GLUCOMTR-MCNC: 142 MG/DL (ref 70–99)
GLUCOSE SERPL-MCNC: 128 MG/DL (ref 70–99)
GLUCOSE UR STRIP-MCNC: 30 MG/DL
HCO3 BLD-SCNC: 24 MMOL/L (ref 21–28)
HCO3 BLDV-SCNC: 24 MMOL/L (ref 21–28)
HCT VFR BLD AUTO: 22.8 % (ref 40–53)
HCT VFR BLD AUTO: 24.2 % (ref 40–53)
HCT VFR BLD AUTO: 24.2 % (ref 40–53)
HGB BLD-MCNC: 7 G/DL (ref 13.3–17.7)
HGB BLD-MCNC: 7.6 G/DL (ref 13.3–17.7)
HGB BLD-MCNC: 7.6 G/DL (ref 13.3–17.7)
HGB BLD-MCNC: 7.7 G/DL (ref 13.3–17.7)
HGB UR QL STRIP: ABNORMAL
INR PPP: 1.24 (ref 0.85–1.15)
INR PPP: 1.29 (ref 0.85–1.15)
ISSUE DATE AND TIME: NORMAL
KETONES UR STRIP-MCNC: NEGATIVE MG/DL
LACTATE SERPL-SCNC: 1.1 MMOL/L (ref 0.7–2)
LACTATE SERPL-SCNC: 1.3 MMOL/L (ref 0.7–2)
LEUKOCYTE ESTERASE UR QL STRIP: ABNORMAL
LISTERIA SPECIES (DETECTED/NOT DETECTED): NOT DETECTED
LVEF ECHO: NORMAL
LYMPHOCYTES # BLD MANUAL: 0 10E3/UL (ref 0.8–5.3)
LYMPHOCYTES NFR BLD MANUAL: 0 %
MAGNESIUM SERPL-MCNC: 2.6 MG/DL (ref 1.7–2.3)
MCH RBC QN AUTO: 29.9 PG (ref 26.5–33)
MCH RBC QN AUTO: 30.2 PG (ref 26.5–33)
MCH RBC QN AUTO: 30.2 PG (ref 26.5–33)
MCHC RBC AUTO-ENTMCNC: 30.7 G/DL (ref 31.5–36.5)
MCHC RBC AUTO-ENTMCNC: 31.4 G/DL (ref 31.5–36.5)
MCHC RBC AUTO-ENTMCNC: 31.4 G/DL (ref 31.5–36.5)
MCV RBC AUTO: 96 FL (ref 78–100)
MCV RBC AUTO: 96 FL (ref 78–100)
MCV RBC AUTO: 97 FL (ref 78–100)
MONOCYTES # BLD MANUAL: 0.3 10E3/UL (ref 0–1.3)
MONOCYTES NFR BLD MANUAL: 3 %
MUCOUS THREADS #/AREA URNS LPF: PRESENT /LPF
MYELOCYTES # BLD MANUAL: 0.1 10E3/UL
MYELOCYTES NFR BLD MANUAL: 1 %
NEUTROPHILS # BLD MANUAL: 8.5 10E3/UL (ref 1.6–8.3)
NEUTROPHILS NFR BLD MANUAL: 96 %
NITRATE UR QL: NEGATIVE
NRBC # BLD AUTO: 0.4 10E3/UL
NRBC BLD MANUAL-RTO: 5 %
NT-PROBNP SERPL-MCNC: 658 PG/ML (ref 0–900)
NT-PROBNP SERPL-MCNC: 757 PG/ML (ref 0–900)
O2/TOTAL GAS SETTING VFR VENT: 4 %
O2/TOTAL GAS SETTING VFR VENT: 6 %
OXYHGB MFR BLD: 96 % (ref 92–100)
OXYHGB MFR BLDV: 57 % (ref 70–75)
PCO2 BLD: 42 MM HG (ref 35–45)
PCO2 BLDV: 49 MM HG (ref 40–50)
PH BLD: 7.37 [PH] (ref 7.35–7.45)
PH BLDV: 7.3 [PH] (ref 7.32–7.43)
PH UR STRIP: 5.5 [PH] (ref 5–7)
PHOSPHATE SERPL-MCNC: 5.1 MG/DL (ref 2.5–4.5)
PLAT MORPH BLD: ABNORMAL
PLATELET # BLD AUTO: 43 10E3/UL (ref 150–450)
PLATELET # BLD AUTO: 71 10E3/UL (ref 150–450)
PLATELET # BLD AUTO: 74 10E3/UL (ref 150–450)
PLATELET # BLD AUTO: 74 10E3/UL (ref 150–450)
PO2 BLD: 107 MM HG (ref 80–105)
PO2 BLDV: 36 MM HG (ref 25–47)
POLYCHROMASIA BLD QL SMEAR: SLIGHT
POTASSIUM SERPL-SCNC: 4.8 MMOL/L (ref 3.4–5.3)
PROCALCITONIN SERPL IA-MCNC: 8.66 NG/ML
PROT SERPL-MCNC: 5 G/DL (ref 6.4–8.3)
RBC # BLD AUTO: 2.34 10E6/UL (ref 4.4–5.9)
RBC # BLD AUTO: 2.52 10E6/UL (ref 4.4–5.9)
RBC # BLD AUTO: 2.52 10E6/UL (ref 4.4–5.9)
RBC MORPH BLD: ABNORMAL
RBC URINE: >182 /HPF
SODIUM SERPL-SCNC: 135 MMOL/L (ref 136–145)
SP GR UR STRIP: 1.02 (ref 1–1.03)
SPECIMEN EXPIRATION DATE: NORMAL
SQUAMOUS EPITHELIAL: 4 /HPF
STAPHYLOCOCCUS AUREUS: NOT DETECTED
STAPHYLOCOCCUS EPIDERMIDIS: NOT DETECTED
STAPHYLOCOCCUS LUGDUNENSIS: NOT DETECTED
STAPHYLOCOCCUS SPECIES: NOT DETECTED
STREPTOCOCCUS AGALACTIAE: NOT DETECTED
STREPTOCOCCUS ANGINOSUS GROUP: NOT DETECTED
STREPTOCOCCUS PNEUMONIAE: NOT DETECTED
STREPTOCOCCUS PYOGENES: NOT DETECTED
STREPTOCOCCUS SPECIES: NOT DETECTED
TACROLIMUS BLD-MCNC: 2.6 UG/L (ref 5–15)
TACROLIMUS BLD-MCNC: 2.7 UG/L (ref 5–15)
TME LAST DOSE: ABNORMAL H
TROPONIN T SERPL HS-MCNC: 139 NG/L
UNIT ABO/RH: NORMAL
UNIT NUMBER: NORMAL
UNIT STATUS: NORMAL
UNIT TYPE ISBT: 5100
UROBILINOGEN UR STRIP-MCNC: NORMAL MG/DL
WBC # BLD AUTO: 10 10E3/UL (ref 4–11)
WBC # BLD AUTO: 8.9 10E3/UL (ref 4–11)
WBC # BLD AUTO: 8.9 10E3/UL (ref 4–11)
WBC URINE: 128 /HPF

## 2023-06-10 PROCEDURE — 85049 AUTOMATED PLATELET COUNT: CPT | Performed by: TRANSPLANT SURGERY

## 2023-06-10 PROCEDURE — 85027 COMPLETE CBC AUTOMATED: CPT | Performed by: SURGERY

## 2023-06-10 PROCEDURE — 93325 DOPPLER ECHO COLOR FLOW MAPG: CPT | Mod: 26 | Performed by: STUDENT IN AN ORGANIZED HEALTH CARE EDUCATION/TRAINING PROGRAM

## 2023-06-10 PROCEDURE — 80197 ASSAY OF TACROLIMUS: CPT | Performed by: SURGERY

## 2023-06-10 PROCEDURE — 250N000009 HC RX 250

## 2023-06-10 PROCEDURE — 83880 ASSAY OF NATRIURETIC PEPTIDE: CPT

## 2023-06-10 PROCEDURE — 84100 ASSAY OF PHOSPHORUS: CPT | Performed by: SURGERY

## 2023-06-10 PROCEDURE — 83605 ASSAY OF LACTIC ACID: CPT | Performed by: TRANSPLANT SURGERY

## 2023-06-10 PROCEDURE — 82330 ASSAY OF CALCIUM: CPT | Performed by: SURGERY

## 2023-06-10 PROCEDURE — 85610 PROTHROMBIN TIME: CPT | Performed by: TRANSPLANT SURGERY

## 2023-06-10 PROCEDURE — 84484 ASSAY OF TROPONIN QUANT: CPT

## 2023-06-10 PROCEDURE — 250N000013 HC RX MED GY IP 250 OP 250 PS 637

## 2023-06-10 PROCEDURE — 99291 CRITICAL CARE FIRST HOUR: CPT | Mod: 24 | Performed by: SURGERY

## 2023-06-10 PROCEDURE — 99222 1ST HOSP IP/OBS MODERATE 55: CPT | Mod: 25 | Performed by: INTERNAL MEDICINE

## 2023-06-10 PROCEDURE — 84075 ASSAY ALKALINE PHOSPHATASE: CPT | Performed by: SURGERY

## 2023-06-10 PROCEDURE — 74018 RADEX ABDOMEN 1 VIEW: CPT | Mod: 26 | Performed by: STUDENT IN AN ORGANIZED HEALTH CARE EDUCATION/TRAINING PROGRAM

## 2023-06-10 PROCEDURE — 250N000012 HC RX MED GY IP 250 OP 636 PS 637: Performed by: INTERNAL MEDICINE

## 2023-06-10 PROCEDURE — 999N000015 HC STATISTIC ARTERIAL MONITORING DAILY

## 2023-06-10 PROCEDURE — 36620 INSERTION CATHETER ARTERY: CPT | Mod: GC | Performed by: SURGERY

## 2023-06-10 PROCEDURE — 85018 HEMOGLOBIN: CPT | Performed by: STUDENT IN AN ORGANIZED HEALTH CARE EDUCATION/TRAINING PROGRAM

## 2023-06-10 PROCEDURE — P9045 ALBUMIN (HUMAN), 5%, 250 ML: HCPCS | Performed by: STUDENT IN AN ORGANIZED HEALTH CARE EDUCATION/TRAINING PROGRAM

## 2023-06-10 PROCEDURE — 84145 PROCALCITONIN (PCT): CPT | Performed by: STUDENT IN AN ORGANIZED HEALTH CARE EDUCATION/TRAINING PROGRAM

## 2023-06-10 PROCEDURE — 85027 COMPLETE CBC AUTOMATED: CPT | Performed by: STUDENT IN AN ORGANIZED HEALTH CARE EDUCATION/TRAINING PROGRAM

## 2023-06-10 PROCEDURE — 85730 THROMBOPLASTIN TIME PARTIAL: CPT | Performed by: TRANSPLANT SURGERY

## 2023-06-10 PROCEDURE — 250N000009 HC RX 250: Performed by: STUDENT IN AN ORGANIZED HEALTH CARE EDUCATION/TRAINING PROGRAM

## 2023-06-10 PROCEDURE — 83880 ASSAY OF NATRIURETIC PEPTIDE: CPT | Performed by: STUDENT IN AN ORGANIZED HEALTH CARE EDUCATION/TRAINING PROGRAM

## 2023-06-10 PROCEDURE — 250N000011 HC RX IP 250 OP 636: Performed by: STUDENT IN AN ORGANIZED HEALTH CARE EDUCATION/TRAINING PROGRAM

## 2023-06-10 PROCEDURE — 93005 ELECTROCARDIOGRAM TRACING: CPT

## 2023-06-10 PROCEDURE — 93308 TTE F-UP OR LMTD: CPT

## 2023-06-10 PROCEDURE — 82805 BLOOD GASES W/O2 SATURATION: CPT | Performed by: TRANSPLANT SURGERY

## 2023-06-10 PROCEDURE — 71250 CT THORAX DX C-: CPT

## 2023-06-10 PROCEDURE — 84460 ALANINE AMINO (ALT) (SGPT): CPT | Performed by: SURGERY

## 2023-06-10 PROCEDURE — 250N000013 HC RX MED GY IP 250 OP 250 PS 637: Performed by: SURGERY

## 2023-06-10 PROCEDURE — 250N000009 HC RX 250: Performed by: SURGERY

## 2023-06-10 PROCEDURE — 85007 BL SMEAR W/DIFF WBC COUNT: CPT | Performed by: SURGERY

## 2023-06-10 PROCEDURE — 250N000013 HC RX MED GY IP 250 OP 250 PS 637: Performed by: NURSE PRACTITIONER

## 2023-06-10 PROCEDURE — 250N000012 HC RX MED GY IP 250 OP 636 PS 637: Performed by: TRANSPLANT SURGERY

## 2023-06-10 PROCEDURE — 86901 BLOOD TYPING SEROLOGIC RH(D): CPT | Performed by: TRANSPLANT SURGERY

## 2023-06-10 PROCEDURE — 258N000003 HC RX IP 258 OP 636: Performed by: TRANSPLANT SURGERY

## 2023-06-10 PROCEDURE — 99255 IP/OBS CONSLTJ NEW/EST HI 80: CPT | Mod: 24 | Performed by: INTERNAL MEDICINE

## 2023-06-10 PROCEDURE — 250N000013 HC RX MED GY IP 250 OP 250 PS 637: Performed by: STUDENT IN AN ORGANIZED HEALTH CARE EDUCATION/TRAINING PROGRAM

## 2023-06-10 PROCEDURE — 71045 X-RAY EXAM CHEST 1 VIEW: CPT | Mod: 26 | Performed by: STUDENT IN AN ORGANIZED HEALTH CARE EDUCATION/TRAINING PROGRAM

## 2023-06-10 PROCEDURE — 250N000012 HC RX MED GY IP 250 OP 636 PS 637: Performed by: STUDENT IN AN ORGANIZED HEALTH CARE EDUCATION/TRAINING PROGRAM

## 2023-06-10 PROCEDURE — 250N000009 HC RX 250: Performed by: TRANSPLANT SURGERY

## 2023-06-10 PROCEDURE — 250N000009 HC RX 250: Performed by: INTERNAL MEDICINE

## 2023-06-10 PROCEDURE — P9016 RBC LEUKOCYTES REDUCED: HCPCS

## 2023-06-10 PROCEDURE — 86850 RBC ANTIBODY SCREEN: CPT | Performed by: TRANSPLANT SURGERY

## 2023-06-10 PROCEDURE — 87086 URINE CULTURE/COLONY COUNT: CPT | Performed by: STUDENT IN AN ORGANIZED HEALTH CARE EDUCATION/TRAINING PROGRAM

## 2023-06-10 PROCEDURE — 250N000011 HC RX IP 250 OP 636

## 2023-06-10 PROCEDURE — 999N000128 HC STATISTIC PERIPHERAL IV START W/O US GUIDANCE

## 2023-06-10 PROCEDURE — 3E03328 INTRODUCTION OF OXAZOLIDINONES INTO PERIPHERAL VEIN, PERCUTANEOUS APPROACH: ICD-10-PCS | Performed by: SURGERY

## 2023-06-10 PROCEDURE — 93308 TTE F-UP OR LMTD: CPT | Mod: 26 | Performed by: STUDENT IN AN ORGANIZED HEALTH CARE EDUCATION/TRAINING PROGRAM

## 2023-06-10 PROCEDURE — 86923 COMPATIBILITY TEST ELECTRIC: CPT

## 2023-06-10 PROCEDURE — 74176 CT ABD & PELVIS W/O CONTRAST: CPT | Mod: 26 | Performed by: RADIOLOGY

## 2023-06-10 PROCEDURE — 85384 FIBRINOGEN ACTIVITY: CPT | Performed by: TRANSPLANT SURGERY

## 2023-06-10 PROCEDURE — 99233 SBSQ HOSP IP/OBS HIGH 50: CPT | Mod: 24 | Performed by: INTERNAL MEDICINE

## 2023-06-10 PROCEDURE — 93010 ELECTROCARDIOGRAM REPORT: CPT | Mod: RTG | Performed by: INTERNAL MEDICINE

## 2023-06-10 PROCEDURE — 250N000011 HC RX IP 250 OP 636: Performed by: TRANSPLANT SURGERY

## 2023-06-10 PROCEDURE — 999N000127 HC STATISTIC PERIPHERAL IV START W US GUIDANCE

## 2023-06-10 PROCEDURE — 71045 X-RAY EXAM CHEST 1 VIEW: CPT

## 2023-06-10 PROCEDURE — 93325 DOPPLER ECHO COLOR FLOW MAPG: CPT

## 2023-06-10 PROCEDURE — 258N000003 HC RX IP 258 OP 636: Performed by: SURGERY

## 2023-06-10 PROCEDURE — 84155 ASSAY OF PROTEIN SERUM: CPT | Performed by: SURGERY

## 2023-06-10 PROCEDURE — 80197 ASSAY OF TACROLIMUS: CPT | Performed by: INTERNAL MEDICINE

## 2023-06-10 PROCEDURE — 200N000002 HC R&B ICU UMMC

## 2023-06-10 PROCEDURE — 258N000003 HC RX IP 258 OP 636: Performed by: STUDENT IN AN ORGANIZED HEALTH CARE EDUCATION/TRAINING PROGRAM

## 2023-06-10 PROCEDURE — 250N000012 HC RX MED GY IP 250 OP 636 PS 637: Performed by: NURSE PRACTITIONER

## 2023-06-10 PROCEDURE — 81001 URINALYSIS AUTO W/SCOPE: CPT | Performed by: STUDENT IN AN ORGANIZED HEALTH CARE EDUCATION/TRAINING PROGRAM

## 2023-06-10 PROCEDURE — 82247 BILIRUBIN TOTAL: CPT | Performed by: SURGERY

## 2023-06-10 PROCEDURE — 83605 ASSAY OF LACTIC ACID: CPT

## 2023-06-10 PROCEDURE — 93321 DOPPLER ECHO F-UP/LMTD STD: CPT | Mod: 26 | Performed by: STUDENT IN AN ORGANIZED HEALTH CARE EDUCATION/TRAINING PROGRAM

## 2023-06-10 PROCEDURE — 74018 RADEX ABDOMEN 1 VIEW: CPT

## 2023-06-10 PROCEDURE — 83735 ASSAY OF MAGNESIUM: CPT | Performed by: SURGERY

## 2023-06-10 PROCEDURE — 82248 BILIRUBIN DIRECT: CPT | Performed by: SURGERY

## 2023-06-10 PROCEDURE — 250N000013 HC RX MED GY IP 250 OP 250 PS 637: Performed by: INTERNAL MEDICINE

## 2023-06-10 PROCEDURE — 71250 CT THORAX DX C-: CPT | Mod: 26 | Performed by: RADIOLOGY

## 2023-06-10 PROCEDURE — 250N000011 HC RX IP 250 OP 636: Performed by: SURGERY

## 2023-06-10 RX ORDER — LINEZOLID 2 MG/ML
600 INJECTION, SOLUTION INTRAVENOUS EVERY 12 HOURS
Status: DISCONTINUED | OUTPATIENT
Start: 2023-06-10 | End: 2023-06-12

## 2023-06-10 RX ORDER — METOCLOPRAMIDE HYDROCHLORIDE 5 MG/ML
5 INJECTION INTRAMUSCULAR; INTRAVENOUS
Status: DISCONTINUED | OUTPATIENT
Start: 2023-06-10 | End: 2023-06-12

## 2023-06-10 RX ORDER — VALGANCICLOVIR HYDROCHLORIDE 50 MG/ML
450 POWDER, FOR SOLUTION ORAL
Status: DISCONTINUED | OUTPATIENT
Start: 2023-06-12 | End: 2023-06-14

## 2023-06-10 RX ORDER — PIPERACILLIN SODIUM, TAZOBACTAM SODIUM 2; .25 G/10ML; G/10ML
2.25 INJECTION, POWDER, LYOPHILIZED, FOR SOLUTION INTRAVENOUS EVERY 6 HOURS
Status: DISCONTINUED | OUTPATIENT
Start: 2023-06-10 | End: 2023-06-12

## 2023-06-10 RX ORDER — LIDOCAINE HYDROCHLORIDE AND EPINEPHRINE 10; 10 MG/ML; UG/ML
1 INJECTION, SOLUTION INFILTRATION; PERINEURAL ONCE
Status: COMPLETED | OUTPATIENT
Start: 2023-06-10 | End: 2023-06-10

## 2023-06-10 RX ORDER — ASPIRIN 300 MG/1
300 SUPPOSITORY RECTAL ONCE
Status: COMPLETED | OUTPATIENT
Start: 2023-06-10 | End: 2023-06-10

## 2023-06-10 RX ORDER — HYDROMORPHONE HCL IN WATER/PF 6 MG/30 ML
0.2 PATIENT CONTROLLED ANALGESIA SYRINGE INTRAVENOUS
Status: DISCONTINUED | OUTPATIENT
Start: 2023-06-10 | End: 2023-06-11

## 2023-06-10 RX ORDER — PIPERACILLIN SODIUM, TAZOBACTAM SODIUM 4; .5 G/20ML; G/20ML
4.5 INJECTION, POWDER, LYOPHILIZED, FOR SOLUTION INTRAVENOUS EVERY 6 HOURS
Status: DISCONTINUED | OUTPATIENT
Start: 2023-06-10 | End: 2023-06-10

## 2023-06-10 RX ORDER — ALBUTEROL SULFATE 90 UG/1
2 AEROSOL, METERED RESPIRATORY (INHALATION) EVERY 6 HOURS PRN
Status: DISCONTINUED | OUTPATIENT
Start: 2023-06-10 | End: 2023-06-25 | Stop reason: HOSPADM

## 2023-06-10 RX ORDER — LIDOCAINE HYDROCHLORIDE 10 MG/ML
INJECTION, SOLUTION EPIDURAL; INFILTRATION; INTRACAUDAL; PERINEURAL
Status: COMPLETED
Start: 2023-06-10 | End: 2023-06-10

## 2023-06-10 RX ORDER — LINEZOLID 2 MG/ML
600 INJECTION, SOLUTION INTRAVENOUS EVERY 12 HOURS
Status: DISCONTINUED | OUTPATIENT
Start: 2023-06-10 | End: 2023-06-10

## 2023-06-10 RX ORDER — SULFAMETHOXAZOLE AND TRIMETHOPRIM 400; 80 MG/1; MG/1
1 TABLET ORAL
Status: DISCONTINUED | OUTPATIENT
Start: 2023-06-12 | End: 2023-06-14

## 2023-06-10 RX ORDER — VALGANCICLOVIR 450 MG/1
450 TABLET, FILM COATED ORAL
Status: DISCONTINUED | OUTPATIENT
Start: 2023-06-12 | End: 2023-06-14

## 2023-06-10 RX ORDER — TACROLIMUS 0.5 MG/1
1 CAPSULE ORAL
Status: DISCONTINUED | OUTPATIENT
Start: 2023-06-10 | End: 2023-06-13

## 2023-06-10 RX ORDER — NOREPINEPHRINE BITARTRATE 0.06 MG/ML
.01-.6 INJECTION, SOLUTION INTRAVENOUS CONTINUOUS
Status: DISCONTINUED | OUTPATIENT
Start: 2023-06-10 | End: 2023-06-11

## 2023-06-10 RX ORDER — TACROLIMUS 0.5 MG/1
1 CAPSULE ORAL
Status: CANCELLED | OUTPATIENT
Start: 2023-06-10

## 2023-06-10 RX ORDER — METOPROLOL TARTRATE 1 MG/ML
5 INJECTION, SOLUTION INTRAVENOUS EVERY 4 HOURS
Status: DISCONTINUED | OUTPATIENT
Start: 2023-06-10 | End: 2023-06-13

## 2023-06-10 RX ADMIN — OXYCODONE HYDROCHLORIDE 2.5 MG: 5 TABLET ORAL at 00:29

## 2023-06-10 RX ADMIN — CALCIUM CHLORIDE, MAGNESIUM CHLORIDE, SODIUM CHLORIDE, SODIUM BICARBONATE, POTASSIUM CHLORIDE AND SODIUM PHOSPHATE DIBASIC DIHYDRATE 12.5 ML/KG/HR: 3.68; 3.05; 6.34; 3.09; .314; .187 INJECTION INTRAVENOUS at 00:14

## 2023-06-10 RX ADMIN — METOPROLOL TARTRATE 5 MG: 5 INJECTION INTRAVENOUS at 23:40

## 2023-06-10 RX ADMIN — METOPROLOL TARTRATE 25 MG: 25 TABLET, FILM COATED ORAL at 07:55

## 2023-06-10 RX ADMIN — TACROLIMUS 1 MG: 1 CAPSULE ORAL at 21:24

## 2023-06-10 RX ADMIN — ASPIRIN 325 MG: 325 TABLET ORAL at 07:54

## 2023-06-10 RX ADMIN — PIPERACILLIN AND TAZOBACTAM 2.25 G: 2; .25 INJECTION, POWDER, FOR SOLUTION INTRAVENOUS at 15:25

## 2023-06-10 RX ADMIN — METOPROLOL TARTRATE 5 MG: 5 INJECTION INTRAVENOUS at 19:44

## 2023-06-10 RX ADMIN — CALCIUM CHLORIDE, MAGNESIUM CHLORIDE, SODIUM CHLORIDE, SODIUM BICARBONATE, POTASSIUM CHLORIDE AND SODIUM PHOSPHATE DIBASIC DIHYDRATE 12.5 ML/KG/HR: 3.68; 3.05; 6.34; 3.09; .314; .187 INJECTION INTRAVENOUS at 03:53

## 2023-06-10 RX ADMIN — LINEZOLID 600 MG: 600 INJECTION, SOLUTION INTRAVENOUS at 13:18

## 2023-06-10 RX ADMIN — SENNOSIDES 8.6 MG: 8.6 TABLET, COATED ORAL at 07:55

## 2023-06-10 RX ADMIN — ASPIRIN 300 MG: 300 SUPPOSITORY RECTAL at 13:23

## 2023-06-10 RX ADMIN — VALGANCICLOVIR HYDROCHLORIDE 450 MG: 450 TABLET ORAL at 07:55

## 2023-06-10 RX ADMIN — PROCHLORPERAZINE EDISYLATE 5 MG: 5 INJECTION INTRAMUSCULAR; INTRAVENOUS at 01:18

## 2023-06-10 RX ADMIN — PREDNISONE 25 MG: 20 TABLET ORAL at 07:54

## 2023-06-10 RX ADMIN — MYCOPHENOLATE MOFETIL 750 MG: 200 POWDER, FOR SUSPENSION ORAL at 07:57

## 2023-06-10 RX ADMIN — MICAFUNGIN SODIUM 100 MG: 50 INJECTION, POWDER, LYOPHILIZED, FOR SOLUTION INTRAVENOUS at 01:03

## 2023-06-10 RX ADMIN — HEPARIN SODIUM 5000 UNITS: 5000 INJECTION, SOLUTION INTRAVENOUS; SUBCUTANEOUS at 07:55

## 2023-06-10 RX ADMIN — PIPERACILLIN SODIUM AND TAZOBACTAM SODIUM 4.5 G: 4; .5 INJECTION, POWDER, LYOPHILIZED, FOR SOLUTION INTRAVENOUS at 09:29

## 2023-06-10 RX ADMIN — ONDANSETRON 4 MG: 2 INJECTION INTRAMUSCULAR; INTRAVENOUS at 01:51

## 2023-06-10 RX ADMIN — CALCIUM CHLORIDE, MAGNESIUM CHLORIDE, SODIUM CHLORIDE, SODIUM BICARBONATE, POTASSIUM CHLORIDE AND SODIUM PHOSPHATE DIBASIC DIHYDRATE 12.5 ML/KG/HR: 3.68; 3.05; 6.34; 3.09; .314; .187 INJECTION INTRAVENOUS at 00:15

## 2023-06-10 RX ADMIN — NOREPINEPHRINE BITARTRATE 0.03 MCG/KG/MIN: 1 INJECTION, SOLUTION, CONCENTRATE INTRAVENOUS at 21:27

## 2023-06-10 RX ADMIN — HUMAN INSULIN 1.5 UNITS/HR: 100 INJECTION, SOLUTION SUBCUTANEOUS at 03:01

## 2023-06-10 RX ADMIN — PROCHLORPERAZINE EDISYLATE 5 MG: 5 INJECTION INTRAMUSCULAR; INTRAVENOUS at 07:48

## 2023-06-10 RX ADMIN — ALBUMIN HUMAN 12.5 G: 0.05 INJECTION, SOLUTION INTRAVENOUS at 09:30

## 2023-06-10 RX ADMIN — TACROLIMUS 2 MG: 1 CAPSULE ORAL at 07:54

## 2023-06-10 RX ADMIN — PROCHLORPERAZINE EDISYLATE 5 MG: 5 INJECTION INTRAMUSCULAR; INTRAVENOUS at 15:39

## 2023-06-10 RX ADMIN — MYCOPHENOLATE MOFETIL 500 MG: 500 INJECTION, POWDER, LYOPHILIZED, FOR SOLUTION INTRAVENOUS at 20:55

## 2023-06-10 RX ADMIN — HEPARIN SODIUM 5000 UNITS: 5000 INJECTION, SOLUTION INTRAVENOUS; SUBCUTANEOUS at 19:44

## 2023-06-10 RX ADMIN — Medication 40 MG: at 07:56

## 2023-06-10 RX ADMIN — CALCIUM CHLORIDE, MAGNESIUM CHLORIDE, SODIUM CHLORIDE, SODIUM BICARBONATE, POTASSIUM CHLORIDE AND SODIUM PHOSPHATE DIBASIC DIHYDRATE: 3.68; 3.05; 6.34; 3.09; .314; .187 INJECTION INTRAVENOUS at 07:00

## 2023-06-10 RX ADMIN — Medication 5 ML: at 07:56

## 2023-06-10 RX ADMIN — PIPERACILLIN AND TAZOBACTAM 2.25 G: 2; .25 INJECTION, POWDER, FOR SOLUTION INTRAVENOUS at 20:14

## 2023-06-10 RX ADMIN — CARBIDOPA AND LEVODOPA 5 MG: 50; 200 TABLET, EXTENDED RELEASE ORAL at 03:50

## 2023-06-10 RX ADMIN — Medication 0.03 MCG/KG/MIN: at 09:31

## 2023-06-10 RX ADMIN — METOPROLOL TARTRATE 5 MG: 5 INJECTION INTRAVENOUS at 13:18

## 2023-06-10 RX ADMIN — URSODIOL 250 MG: 250 TABLET, FILM COATED ORAL at 07:54

## 2023-06-10 RX ADMIN — METHOCARBAMOL 500 MG: 500 TABLET ORAL at 07:54

## 2023-06-10 RX ADMIN — ALBUTEROL SULFATE 2 PUFF: 90 AEROSOL, METERED RESPIRATORY (INHALATION) at 05:36

## 2023-06-10 RX ADMIN — LINEZOLID 600 MG: 600 INJECTION, SOLUTION INTRAVENOUS at 21:24

## 2023-06-10 RX ADMIN — LIDOCAINE HYDROCHLORIDE 300 MG: 10 INJECTION, SOLUTION EPIDURAL; INFILTRATION; INTRACAUDAL; PERINEURAL at 15:07

## 2023-06-10 RX ADMIN — POLYETHYLENE GLYCOL 3350 17 G: 17 POWDER, FOR SOLUTION ORAL at 07:54

## 2023-06-10 RX ADMIN — SULFAMETHOXAZOLE AND TRIMETHOPRIM 1 TABLET: 400; 80 TABLET ORAL at 07:54

## 2023-06-10 ASSESSMENT — ACTIVITIES OF DAILY LIVING (ADL)
ADLS_ACUITY_SCORE: 38
ADLS_ACUITY_SCORE: 39
ADLS_ACUITY_SCORE: 37
ADLS_ACUITY_SCORE: 39
ADLS_ACUITY_SCORE: 37

## 2023-06-10 NOTE — PROGRESS NOTES
"CLINICAL NUTRITION SERVICES - Brief note:       Nutrition Prescription     RECOMMENDATIONS FOR MDs/PROVIDERS TO ORDER:  TPN/PPN not ordered due to line infection and patient with no central/peripheral line in place.   - Re-consult RD if needed.      Recommendations already ordered by Registered Dietitian (RD):  - None.  - Discussed TPN start with pharmacist given line infection. Per pharmacist, \" MD plan to cancel TPN consult ).     Future/Additional Recommendations:  PPN recommendation:      Dosing wt: 78 kg adjusted wt from dry admit wt of 102.3 kg   Access: Needs Peripheral line / needs midline     Goal PPN: Use Peripheral Clinimix (5%/4.25%) daily @ max 40 ml/hr (960 ml/day to avid volume load given patient with no HD access, oliguric)  IV lipids: Use 100 ml SMOF lipids, 7 days per week      - Regimen provides: 526 kcal /day (7 kcal/kg ), 41 gms protein /day (0.5 gm/kg), 48 gm carbs/day (GIR:0.43 mg/kg/min) and 38% average daily lipids.      TPN recommendation:   - Volume PN: Max concentration ( CRRT on hold due to sepsis)  - Dextrose: 190 grams/day (new liver, lower goal to avoid hepatic dysfunction. On IV insulin).   - AA: 95 grams/day (~ 1.2 gm/kg), New kidney, HD line removed due to sepsis.  - 250 ml IV SMOF lipids x 6 days per week ( SMOF given sepsis)      - Refeeding precaution: If Mg++/K+ /Phos normal, begin PN with dextrose of 120 gm /day (GIR:1.1). Once pt receives ~100% of initial continuous PN volume with K+/Mg++/Phos  WNL, advance PN dextrose by 35 gm daily to goal dextrose of 190 gm/day.      - Regimen provides: 1454 kcal /day (19 kcal/kg ), 95 gm protein /day (1.2 gm/kg), 190 gm carbs (GIR: 1.8 mg/kg/min) and 29% daily lipids.     - Vitamin /minerals:10 ml Infuvite, 1 ml MTE-4 + 100 mg thiamine with TPN start and advancement          Provider consult: Pharmacy/Nutrition to start and manage TPN - Indication: Not tolerating EN     Nutrition Progress Note - See most recent RD note from 6/9     Chart " reviewed:   - ESLD + ESRD, s/p simultaneous liver + kidney transplant on 23.   - CRRT stopped today 6/10 due to sepsis. Tunnel line removed. Per nephrology, Line holiday for 1-2 days, likely   - Pt taken back to OR on 23 due to lack of blood flow to the kidney transplant but intra op U/S normal.  - Moderate edema  - Not intubated       Nutrition:   - NPO post op liver / kidney transplant   - FT: ND tube ( On Novasource renal at 40 ml/hr + 3 packet of Prosource TF 20)  - NG tube for suction, concern for Ileus      IVF:   - None noted        Lytes:   - K+: 4.8, Mg++: 2.6 (H), Phos: 5.1 (H)   - B -> - On insulin gtt  - Na+: 135  - BUN: 31.4(H), Cr: 1.42(H), GFR: 55 -> was on CRRT ,stopped today due to sepsis (6/10). Oliguric ( had 21 ml urine output yesterday)    AP:198 (H), ALT: 450 (H), AST: 186 (H), total bili: 2.1 (H)  TGs: N/A       GI:   Stool x 2 (), x 6 ()       Anastasia Estrada RD/LD  Pager [phone]

## 2023-06-10 NOTE — PROGRESS NOTES
SURGICAL ICU PROGRESS NOTE  06/10/2023      PRIMARY TEAM: Transplant Surgery  PRIMARY PHYSICIAN: Dr. Clifton  REASON FOR CRITICAL CARE ADMISSION: Close hemodynamic monitoring, mechanical ventilation, CRRT  ADMITTING PHYSICIAN: Dr. 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 PMHx also includes coronary artery disease, alcohol overuse, obesity, ESRD on HD -- (IgA nephropathy + ANCA vasculitis), psoriatic arthritis, paroxysmal atrial fibrillation (on warfarin), DVT, recurrent c diff, and HTN.  He is now s/p  donor liver and kidney transplant on 23 with Dr. Clifton.  Intraop:   ml  500 ml Cell saver returned  4 uPRBC  2uPlt  1 uFFP  5L crystalloid  UOP 40cc prior to kidney, 90cc post kidney    PLAN:    Updates today (06/10/2023)  - Concern for sepsis    - Zosyn and linezolid    - Consult transplant ID    - Chest XR    - UA   - C. Dif stool test    - pull dialysis line   - Concern for post operative ileus    - abdominal XR    - CT abdomen/pelvis without contrast   - Lactate    - NG tube to suction   - hold TF   - decompress abdomen    - possibly start TPN tomorrow   - Hypotension vs shock (septic vs cardiogenic) vs MI    - hold oral metoprolol   - restart levophed with MAP goal > 65    - IV metoprolol    - TTE echo limited    - cardiology consulted    - place axillary arterial line    - administer 1U of pRBC    - 12.5 albumin administered   - Stop CRRT    Neurological:  # Acute post-surgical pain   # Hx Alcohol use disorder, sober since 2016  - Monitor neurological status. Delirium preventions and precautions  - Sedation plan: None indicated  - Pain control: Low dose dilaudid (0.2) q2h PRN. Hold tylenol for now  per transplant until LFTS are trending down consistently. PRN oxycodone 5-10mg q4h, robaxin 500 QID    Pulmonary:   # Post operative ventilatory support, resolved  # Acute hypoxic respiratory failure requiring mechanical ventilation, resolved  - Successfully extubated 6/7 to NC, was saturating well on room air past couple days. Required 2 L NC overnight to maintain oxygenation status. Now on 6L oxymask. Closely monitor for signs of needing reintubation.   - Aggressive pulmonary hygiene, IS, encourage OOB  - Supplemental O2 as needed to maintain SpO2 > 92%  - CXR to evaluate questionable aspiration pneumonia   - Collect VBG    Cardiovascular:    #Hx pAfib on PTA warfarin  # Afib with RVR, improving  #Hx CAD  #Hx HTN  #Hypotension, orthostatic  #Shock-distributive vs hypovolemic resolved  #Shock - septic vs cardiogenic   - ECHO 6/5/23: afib, LVEF 55-60%  - CVP goal 8-12  - MAP goal 60-85, SBP goal 110-160. Off pressor since about 2000 6/7. Restart levophed gtt due to worsening hypotension.  - PTA metoprolol succinate ER 25 daily, 6/7 started metoprolol 12.5 BID 6/7, 6/8 increased to 25mg BID. Increased to 25mg q8h by transplant team. Holding oral metoprolol today, starting IV metoprolol 5 mg q4h with hold parameters.   - Intermittent hypotension, midodrine 5mg TID while on CRRT. Stopping midodrine today.  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 NJ placed , TF at goal. Stop TF.  - PPI   - Having bowel function and passing gas, continue bowel regimen  - Strict NPO today due to ileus concern   #concern for post operative ileus  - decompress abdomen  - hold tube feeds   - NG tube to suction with >750 mL of output   - lactate labs drawn  - possible TPN starting tomorrow   - CT chest/abdomen  - abdominal XR     Renal/ Fluids/Electrolytes:   #Lactic acidosis, resolved  # Hx ESRD on HD MWF (IgA nephropathy + ANCA vasculitis) now s/p DDKT 23  # Delayed graft function  - Expect some delayed graft function with  donor kidney, thought still making minimal urine  - Stopping CRRT per transplant nephrology  - transplant nephrology consulted and following  - Lux catheter to remain in place until at least POD5 per transplant surgery  - currently having no urine output   - Pull dialysis line due to concern for infection per nephrology       Endocrine:  # Stress hyperglycemia   -Insulin drip for blood glucose management, continue today while advancing TF and still on steroid taper. Goal to keep BG< 180 for optimal wound healing      ID:  #Stress leukocytosis, improving  #Post-transplant ppx per transplant  - WBC 10.0 from 10.9   -Perioperative antibiotics: Zosyn x 48 hrs (completed ), micafungin x 14 days  -Immunosuppression per transplant: thymoglobulin (hold today), tacrolimus 3mg BID (currently held), methylprednisolone taper  -Prophylactic regimen: Valganciclovir, Bactrim Ppx  #positive blood culture 6/10  - Both cultures returned with gram positive cocci in rods and chains  - start Zosyn and linezolid, possibly start meropenem refer to transplant ID recomendations   - follow blood cultures daily   -  consult transplant ID  - pull dialysis line per nephrology   - UA and C. Diff labs     Heme:     # Acute blood loss anemia   # Anemia of critical illness and chronic renal disease  # Hx hereditary hemochromatosis   # Thrombocytopenia 2/2 liver dysfunction  - Transfusion goals: INR <2, Platelets > 20, Fibrinogen > 200, Hgb >8.0  - Hgb stable 8.1, Plt 52 (76), INR 1.6. No current indication for correction.   - transfuse 1U pRBC due to hypotension    Rheumatologic:  #Hx Gout  - hold PTA prednisone 10mg daily    Musculoskeletal:  # Weakness and deconditioning of critical illness   - Physical and occupational therapy consult, appreciate recommendations. Encourage increased time OOB.     Skin:  -Diligent skin care to prevent breakdown    General Cares/Prophylaxis:    DVT Prophylaxis: SQH q120, SCDs  GI Prophylaxis: None indicated  Restraints: Restraints for medical healing needed: NO    Lines/ tubes/ drains:  -PIVs  -PTA Left tunneled IJ dilaysis line   -Right internal jugular triple lumen CVC   -Lux catheter  -Intraabdominal MAE drain x1   -incisional wound vac (kidney incision)  -insert axillary line today    Disposition:  - Surgical ICU  - Barriers to leaving ICU: worsening status     Leatha Solano, MS4     I saw and evaluated this patient with the medical student. Discussed with ICU staff.    Taco Ramirez MD   Surgery      - - - - - - - - - - - - - - - - - - - - - - - - - - - - - - - - - - - - - - - - - - - - - -   INTERVAL EVENTS/SUBJECTIVE:   Continues to have significant nausea. Had an episode of projectile non bilious emesis during exam. Denies pain. Also experiencing shortness of breath and chest discomfort with breathing. Denies fever and chills. Overall is not feeling well.     PHYSICAL EXAMINATION:  Temp:  [97.2  F (36.2  C)-99  F (37.2  C)] 97.6  F (36.4  C)  Pulse:  [] 122  Resp:  [10-17] 13  BP: ()/(31-86) 91/59  SpO2:  [91 %-100 %] 98 %     General: Lying in bed, uncomfortable.   HEENT: NJ in  place, scleral icterus in both eyes   Neck: Right CVC  Pulm/Resp: Labored breathing on 2 L NC, equal chest rise, breath sounds coarse bilaterally  CV: irregularly irregular rhythm on monitor, tachycardic in the 120s  Abdomen: distended, chevron incision without draining, RLQ prevena holding suctoin, MAE x 1 to bulb suction with serosanguinous output, soft reducible umbilical hernia.  : stauffer catheter in place without urine output  MSK/Extremities: peripheral edema R>L, peripheral pulses intact, extremities cold, SCDs in place  Skin: purpura throughout face, chest, abdomen, and arms, slight jaundice to skin, no erythema or streaking   Neuro: PERRL, alert and oriented with clear speech at the beginning of exam with decreasing mentation throughout morning, no gross deficits appreciated    LABS: Reviewed.   Arterial Blood Gases   Recent Labs   Lab 06/07/23  1637 06/07/23  1206 06/07/23  0757 06/07/23  0341   PH 7.43 7.44 7.46* 7.39   PCO2 33* 32* 31* 33*   PO2 71* 129* 127* 171*   HCO3 22 22 22 20*     Complete Blood Count   Recent Labs   Lab 06/10/23  0341 06/09/23 2019 06/09/23  1637 06/09/23  1305 06/09/23  0413   WBC 8.9  8.9 6.3  --  11.9* 10.9   HGB 7.6*  7.6* 7.5*  --  7.7* 8.1*   PLT 74*  74* 47* 55* 54* 52*     Basic Metabolic Panel  Recent Labs   Lab 06/10/23  0607 06/10/23  0341 06/10/23  0340 06/10/23  0159 06/09/23  2221 06/09/23 2019 06/09/23  1440 06/09/23  1305 06/09/23  0415 06/09/23  0413   NA  --  135*  --   --   --  137  --  137  --  135*   POTASSIUM  --  4.8  --   --   --  4.9  --  5.1  --  5.0   CHLORIDE  --  102  --   --   --  104  --  104  --  102   CO2  --  22  --   --   --  20*  --  22  --  22   BUN  --  31.4*  --   --   --  32.2*  --  32.2*  --  29.0*   CR  --  1.42*  --   --   --  1.52*  --  1.59*  --  1.61*   * 128* 127* 119*   < > 144*  139*   < > 136*   < > 131*    < > = values in this interval not displayed.     Liver Function Tests  Recent Labs   Lab 06/10/23  7131  06/09/23  2019 06/09/23  1305 06/09/23  0413 06/08/23 2027 06/08/23  1619 06/08/23  1204 06/08/23  0415 06/07/23  0758 06/07/23  0330   *  --   --  405*  --   --   --  816*  --  1,834*   *  --   --  566*  --   --   --  692*  --  654*   ALKPHOS 198*  --   --  177*  --   --   --  107  --  81   BILITOTAL 2.1*  --   --  1.5*  --   --   --  1.7*  --  1.6*   ALBUMIN 2.8* 2.5* 2.4* 2.4*   < >  --    < > 2.3*   < > 2.0*   INR 1.24*  --  1.38* 1.60*  --  2.04*  --  1.98*   < > 2.02*    < > = values in this interval not displayed.     Pancreatic Enzymes  Recent Labs   Lab 06/07/23  0330 06/05/23  1605   LIPASE 59  --    AMYLASE 90 153*     Coagulation Profile  Recent Labs   Lab 06/10/23  0341 06/09/23  1305 06/09/23  0413 06/08/23  1619   INR 1.24* 1.38* 1.60* 2.04*   PTT 28 28 31 36       IMAGING:  Recent Results (from the past 24 hour(s))   XR Abdomen Port 1 View    Narrative    Exam: XR ABDOMEN PORT 1 VIEW, 6/9/2023 2:05 PM    Indication: Verify small bowel feeding tube bedside placement    Comparison: X-ray abdomen 6/7/2023, multiple priors.    Findings:   AP supine portable radiograph of the chest. Partial exclusion of the  left hemiabdomen from the field-of-view. Feeding tube tip projects  over the expected location of the proximal duodenum. Stable  positioning of upper quadrant surgical drain. Postsurgical changes  including abdominal wall skin staples, coils projecting over the left  upper quadrant, surgical clips over the epigastrium and left upper  quadrant. Scattered air-filled loops of bowel. No subdiaphragmatic  air, no portal venous gas. The visualized bones are unremarkable.      Impression    Impression: Feeding tube tip projects over the expected location of  the proximal duodenum.    I have personally reviewed the examination and initial interpretation  and I agree with the findings.    DELONTE CANTU MD         SYSTEM ID:  [id number]

## 2023-06-10 NOTE — PROCEDURES
Procedure: Arterial line placement    Consent: obtained, from family at bedside, signed after risk benefit explained and all questions answered to the best of my knowledged and on file in the chart.     Location: Arterial line placement into the right axillary artery.    Universal Protocol: Patient Identification was verified, time out was performed.    Indication: Close blood pressure monitoring and frequent arterial blood gas draws in an hemodynamically unstable patient.    Narrative: Area prepped with chlorhexedine and draped in sterile fashion. 1% lidocaine injected subcutaneously for local anesthesia. An arterial catheter inserted using seldinger technique and sutured to the skin. Axillary artery cannulation was successfully obtained on first poke attempt. Distal blood flow was preserved and an appropriate arterial wave form was noted on the monitor.     Complications: No apparent complication    SICU fellow available at bedside during the procedure. Staff, was available at all times of the procedure.    Jeremy Moffett MD  Surgery Resident

## 2023-06-10 NOTE — CONSULTS
[facility]  Transplant Infectious Disease Consult Note - New Patient     Patient:  Damion Quinones, Date of birth 1955, Medical record number [id number]  Date of Visit:  06/10/2023  Consult requested by Dr. Chad Clifton for evaluation of enterococcus bacteremia         Assessment and Recommendations:   Recommendations:  1. Continue linezolid 600 mg IV q12 hrs  2. Continue pip/tazo while infectious work-up continues. If no other organisms in 48 hours, can likely stop on Monday  3. Repeat blood cultures daily until negative  4. Please obtain complete TTE to evaluate heart valves  5. Please obtain sputum culture if able  6. 24-48 hour line holiday if hemodynamic and clinical course allow    Thank you very much for this consultation. Transplant Infectious Disease will continue to follow with you.    Assessment:  67 yoM with PMH of hemochromatosis and decompensated alcohol cirrhosis c/b variceal bleeding s/p TIPS (10/1/2022) and hepatic encephalopathy, IgA nephropathy and ANCA vaculitis causing CKD now s/p simultaneous liver and kideny transplant on 6/6/2023 with RTOR on 6/6/2023 with concern for low flow in the renal graft, with healthy appearing graft and intact arterial and venous flow, now with blood culture positive with GPC, enterococcus by verigene (neg Aisha and vanB) but rectal swab pre-transplant with VRE positivity.     #enterococcus faecium bacteremia  Patient with recent simultaneous liver and renal transplant with new onset sepsis and blood culture positive with enterococcus faecium (by verigene). Of note the Aisha and vanB genes (which confer Vancomycin resistance) were negative. 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 For this reason, we will cover with linezolid while awaiting culture susceptibilities. He also may have had an aspiration event this morning and had increasing supplemental oxygen requirements today. Agree with  treating broadly with linezolid and zosyn while infectious work-up is in process. If no organisms besides enterococcus are present in ~48 hours can likely narrow to just enterococcal coverage.   Will also recommend TTE to evaluate heart valves and a line holiday if clinically feasible.     Other Infectious Disease issues include:  - VRE colonization  - h/o Cdiff     - QTc interval: 429  On 6/10  - Bacterial prophylaxis: linezolid/zosyn  - Pneumocystis prophylaxis: tmp/sulfa  - Viral serostatus & prophylaxis: CMV+, EBV +, HSV 1?/2?, vzv ?  - Fungal prophylaxis: micafungin  - Risk factors to suggest check of Toxoplasma, Strongy, or Schisto serology?:  - Gamma globulin status: not recently checked  - Isolation status: Good hand hygiene.    Abdias Eisenberg MD   Pager [phone]         History of Infectious Disease Illness:   67 yoM with PMH of hemochromatosis and decompensated alcohol cirrhosis c/b variceal bleeding s/p TIPS (10/1/2022) and hepatic encephalopathy, IgA nephropathy and ANCA vaculitis causing CKD now s/p simultaneous liver and kideny transplant on 6/6/2023 with RTOR on 6/6/2023 with concern for low flow in the renal graft, with healthy appearing graft and intact arterial and venous flow, now with blood culture positive with GPC, enterococcus by verigene (neg Aisha and vanB) but rectal swab pre-transplant with VRE positivity.     At bedside he appears uncomfortable and SOB, on NC with facemask oxygen on. Able to say a few words and follow commands. Wife and daughter with him. May have had an aspiration event this morning with increasing oxygen requirements. Denies complaints  of pain but states he feels SOB and unwell.       Transplants:  6/6/2023 (Liver), 6/6/2023 (Kidney), Postoperative day:  4 (Liver), 4 (Kidney).  Coordinator Cindy Clay, Angelita Torres    Review of Systems:  Unable to perform 2/2 acute illness    Past Medical History:   Diagnosis Date     Alcoholic cirrhosis of liver with ascites  (H) 10/11/2019     C. difficile colitis      Coronary artery disease     [facility] 2023 - complete occlusion of RCA     ESRD (end stage renal disease) on dialysis (H)      History of hemochromatosis 10/11/2019     Hypertension      Obesity      PAF (paroxysmal atrial fibrillation) (H)      Psoriatic arthritis (H)        Past Surgical History:   Procedure Laterality Date     APPENDECTOMY      Removed at 18 Years Old      BENCH KIDNEY  2023    Procedure: Bench kidney;  Surgeon: Chad Clifton MD;  Location:  OR     BENCH LIVER  2023    Procedure: Bench liver;  Surgeon: Chad Clifton MD;  Location:  OR     COLONOSCOPY       at [facility] CORONARY ANGIOGRAM N/A 2023    Procedure: Coronary Angiogram;  Surgeon: Pablo Araujo MD;  Location:  HEART CARDIAC CATH LAB     EYE SURGERY Bilateral     Cataract     HERNIA REPAIR      History of bilateral inguinal hernia repair: 10/28/2014. Open hernia repair: 10/2017. Abdominal wound exploration and debridement 2017     INSERT SHUNT PORTAL TRANSJUGULAR INTRAHEPTIC  2022     IR CVC TUNNEL PLACEMENT > 5 YRS OF AGE  2023     RETURN LIVER TRANSPLANT N/A 2023    Procedure: Return liver transplant. Intra-operative ultrasound;  Surgeon: Chad Clifton MD;  Location:  OR     TRANSPLANT KIDNEY RECIPIENT  DONOR N/A 2023    Procedure: Transplant kidney recipient  donor, ureteral stent placement;  Surgeon: Chad Clifton MD;  Location:  OR     TRANSPLANT LIVER RECIPIENT  DONOR N/A 2023    Procedure: Transplant liver recipient  donor;  Surgeon: Chad Clifton MD;  Location:  OR       Family History   Problem Relation Age of Onset     Lung Cancer Mother      Colon Cancer Father      Lung Cancer Brother      Heart Failure Brother      Kidney Disease Brother        Social History     Social History Narrative     Not on file     Social History      Tobacco Use     Smoking status: Never     Passive exposure: Never     Smokeless tobacco: Never   Vaping Use     Vaping status: Never Used   Substance Use Topics     Alcohol use: Not Currently     Comment: Last ETOH use was 12/31/2021     Drug use: Not Currently       Immunization History   Administered Date(s) Administered     COVID-19 Bivalent 18+ (Moderna) 11/10/2022     COVID-19 MONOVALENT 12+ (Pfizer) 04/02/2021, 04/23/2021, 11/05/2021     FLUAD(HD)65+ QUAD 10/03/2022     Hepatitis A (ADULT 19+) 12/05/2018, 09/29/2021     Hepatitis B, Adult 12/05/2018, 09/29/2021     Influenza Vaccine >6 months (Alfuria,Fluzone) 10/28/2016, 09/08/2017, 12/05/2018, 09/24/2019, 09/14/2020, 09/27/2021     Pneumo Conj 13-V (2010&after) 03/21/2023     Pneumococcal 23 valent 10/28/2016     TDAP (Adacel,Boostrix) 06/11/2014, 08/29/2021     Td (Adult), Adsorbed 06/10/1999       Patient Active Problem List   Diagnosis     Alcoholic cirrhosis of liver with ascites (H)     Psoriatic arthritis (H)     PAF (paroxysmal atrial fibrillation) (H)     End-stage liver disease (H)     Leukocytosis, unspecified type     ESRD (end stage renal disease) on dialysis (H)     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)     Esophageal varices in cirrhosis (H)     Essential hypertension     Family history of colon cancer     Family history of prostate cancer     GAVE (gastric antral vascular ectasia)     Hereditary hemochromatosis (H)     Other hyperlipidemia     Psoriasis     S/P TIPS (transjugular intrahepatic portosystemic shunt)     Tophaceous gout     Deep vein thrombosis (DVT) of non-extremity vein, unspecified chronicity     Moderate protein-calorie malnutrition (H)     Alcohol use, unspecified with unspecified alcohol-induced disorder (H)     Acute deep vein thrombosis (DVT) of right lower extremity, unspecified vein (H)     Encephalopathy     Pre-operative cardiovascular examination     CKD  (chronic kidney disease) stage 5, GFR less than 15 ml/min (H)     Status post coronary angiogram     Organ transplant candidate     Recurrent Clostridioides difficile diarrhea     Chronic atrial fibrillation (H)     Physical deconditioning     Liver transplant candidate     Liver transplant recipient (H)            Current Medications & Allergies:       [Held by provider] aspirin  325 mg Oral Daily     B and C vitamin Complex with folic acid  5 mL Per Feeding Tube Daily     heparin ANTICOAGULANT  5,000 Units Subcutaneous Q12H     linezolid  600 mg Intravenous Q12H     methocarbamol  500 mg Oral 4x Daily     metoprolol  5 mg Intravenous Q4H     [Held by provider] metoprolol tartrate  25 mg Oral Q8H     micafungin  100 mg Intravenous Q24H     mycophenolate  750 mg Oral BID IS     pantoprazole  40 mg Per Feeding Tube QAM AC     piperacillin-tazobactam  2.25 g Intravenous Q6H     polyethylene glycol  17 g Oral Daily     [START ON 6/11/2023] predniSONE  10 mg Oral Daily     protein modular  1 packet Per Feeding Tube TID     sennosides  8.6 mg Oral BID     sodium chloride (PF)  3 mL Intravenous Q8H     [START ON 6/12/2023] sulfamethoxazole-trimethoprim  1 tablet Oral Once per day on Mon Wed Fri     tacrolimus  2 mg Oral BID IS     ursodiol  250 mg Oral BID     [START ON 6/12/2023] valGANciclovir  450 mg Oral Once per day on Mon Thu    Or     [START ON 6/12/2023] valGANciclovir  450 mg Oral or NG Tube Once per day on Mon Thu       Infusions/Drips:      dextrose       dextrose       insulin regular 1.5 Units/hr (06/10/23 0700)     NaCl 0.45 % 1,000 mL infusion       norepinephrine 0.03 mcg/kg/min (06/10/23 0931)       No Known Allergies         Physical Exam:     Patient Vitals for the past 24 hrs:   BP Temp Temp src Pulse Resp SpO2 Weight   06/10/23 1500 (!) 61/39 -- -- 114 -- 99 % --   06/10/23 1454 99/87 98.9  F (37.2  C) Axillary -- 26 99 % --   06/10/23 1444 93/60 98.8  F (37.1  C) -- 110 -- -- --   06/10/23 1400  100/59 -- -- 111 -- 99 % --   06/10/23 1300 95/66 -- -- 116 -- 98 % --   06/10/23 1230 93/60 -- -- 115 -- 97 % --   06/10/23 1215 -- -- -- (!) 135 -- 97 % --   06/10/23 1145 (!) 89/64 -- -- 119 22 99 % --   06/10/23 1130 100/65 -- -- (!) 127 -- 100 % --   06/10/23 1115 (!) 85/69 -- -- (!) 135 -- 99 % --   06/10/23 1100 103/78 -- -- 112 -- 99 % --   06/10/23 1045 (!) 68/46 -- -- (!) 122 -- 100 % --   06/10/23 1030 97/88 -- -- 113 -- (!) 66 % --   06/10/23 1015 102/73 -- -- 118 -- 96 % --   06/10/23 1000 101/56 -- -- 119 -- 100 % --   06/10/23 0945 (!) 108/99 -- -- (!) 121 -- 93 % --   06/10/23 0930 90/55 -- -- (!) 130 -- (!) 83 % --   06/10/23 0915 (!) 76/51 -- -- 118 -- 100 % --   06/10/23 0900 (!) 82/31 -- -- 120 -- 100 % --   06/10/23 0845 (!) 72/43 -- -- (!) 153 -- 100 % --   06/10/23 0830 -- -- -- (!) 135 -- 99 % --   06/10/23 0815 -- -- -- (!) 131 -- 98 % --   06/10/23 0800 (!) 77/60 98.6  F (37  C) Axillary 110 13 99 % --   06/10/23 0745 -- -- -- 120 -- 97 % --   06/10/23 0730 -- -- -- (!) 130 -- 97 % --   06/10/23 0715 -- -- -- (!) 121 -- 97 % --   06/10/23 0700 91/59 -- -- (!) 122 13 98 % --   06/10/23 0645 -- -- -- 118 -- 98 % --   06/10/23 0630 -- -- -- 118 -- 98 % --   06/10/23 0615 -- -- -- (!) 131 -- 97 % --   06/10/23 0600 (!) 88/74 -- -- 115 16 98 % --   06/10/23 0545 -- -- -- (!) 135 -- 98 % --   06/10/23 0530 -- -- -- (!) 121 -- 98 % --   06/10/23 0515 -- -- -- (!) 128 -- 99 % --   06/10/23 0500 (!) 86/56 -- -- 120 14 98 % --   06/10/23 0445 -- -- -- (!) 125 -- 98 % --   06/10/23 0430 -- -- -- 116 -- 98 % --   06/10/23 0415 -- -- -- (!) 128 -- 98 % --   06/10/23 0400 (!) 86/72 97.6  F (36.4  C) Axillary (!) 122 16 96 % 113.8 kg (250 lb 14.1 oz)   06/10/23 0300 (!) 89/58 -- -- 117 17 96 % --   06/10/23 0200 94/76 -- -- 119 12 94 % --   06/10/23 0100 94/73 -- -- 98 12 94 % --   06/10/23 0000 91/72 97.9  F (36.6  C) Oral 112 13 95 % --   06/09/23 2300 96/62 -- -- 119 14 96 % --   06/09/23 2230 102/75  98.4  F (36.9  C) -- 112 12 98 % --   06/09/23 2200 92/77 -- -- 117 12 93 % --   06/09/23 2130 (!) 84/59 97.4  F (36.3  C) -- 115 13 98 % --   06/09/23 2112 101/65 98.4  F (36.9  C) -- 104 12 97 % --   06/09/23 2100 (!) 85/57 -- -- 116 -- 97 % --   06/09/23 2030 (!) 85/64 -- -- 104 -- 97 % --   06/09/23 2000 (!) 76/62 98  F (36.7  C) Oral 101 14 99 % --   06/09/23 1900 (!) 79/66 -- -- 108 -- 100 % --   06/09/23 1800 (!) 89/64 -- -- 91 -- 99 % --   06/09/23 1745 -- -- -- 96 16 99 % --   06/09/23 1730 -- -- -- 99 -- 100 % --   06/09/23 1715 95/68 -- -- 100 16 97 % --   06/09/23 1700 (!) 87/31 97.8  F (36.6  C) Axillary 94 12 98 % --   06/09/23 1645 -- -- -- 114 -- 99 % --   06/09/23 1630 -- -- -- 92 -- 98 % --     Ranges for vital signs:  Temp:  [97.4  F (36.3  C)-98.9  F (37.2  C)] 98.9  F (37.2  C)  Pulse:  [] 114  Resp:  [12-26] 26  BP: ()/(31-99) 61/39  SpO2:  [66 %-100 %] 99 %  Vitals:    06/08/23 0400 06/09/23 0600 06/10/23 0400   Weight: 116.3 kg (256 lb 6.3 oz) 113.4 kg (250 lb) 113.8 kg (250 lb 14.1 oz)       Physical Examination:  GENERAL:  obese, in bed uncomfortable.  HEAD:  Head is normocephalic, atraumatic, NC and O2 facemask on   EYES:  Eyes have anicteric sclerae without conjunctival injection   ENT:  Oropharynx is moist without exudates or ulcers. Tongue is midline  LUNGS:  Coarse breath sounds bilaterally  CARDIOVASCULAR:  Tachycardic, irregularly irregular with no murmurs, gallops or rubs.  ABDOMEN:  Incision with stables is well approximated with minimal erythema and no discharge. Belly otherwise soft, non-tender.   SKIN:  No acute rashes.CVC in RIJ and LIJ  NEUROLOGIC:  Grossly nonfocal. Active x4 extremities  MSK: 3-4+ pitting edema in UE/LE with weeping serous fluid         Laboratory Data:     No results found for: ACD4, HIVPCR    Inflammatory Markers    Recent Labs   Lab Test 01/15/23  1107 12/16/22  1652 11/22/22  0848   SED 11  --   --    CRPI 19.20* 77.70*  --    PSA  --   --   0.69       Immune Globulin Studies   No lab results found.    Metabolic Studies       Recent Labs   Lab Test 06/10/23  1459 06/10/23  1018 06/10/23  1012 06/10/23  0607 06/10/23  0341 06/09/23  2221 06/09/23 2019 06/06/23 2109 06/06/23  2108   NA  --   --   --   --  135*  --  137   < > 142   POTASSIUM  --   --   --   --  4.8  --  4.9   < > 5.0   CHLORIDE  --   --   --   --  102  --  104   < > 107   CO2  --   --   --   --  22  --  20*   < > 20*   ANIONGAP  --   --   --   --  11  --  13   < > 15   BUN  --   --   --   --  31.4*  --  32.2*   < > 32.6*   CR  --   --   --   --  1.42*  --  1.52*   < > 2.76*   GFRESTIMATED  --   --   --   --  55*  --  51*   < > 25*   *   < >  --    < > 128*   < > 144*  139*   < > 195*   A1C  --   --   --   --   --   --   --   --  5.1   NAZARIO  --   --   --   --  8.0*  --  7.6*   < > 8.6*   PHOS  --   --   --   --  5.1*  --  5.1*   < >  --    MAG  --   --   --   --  2.6*  --  2.6*   < >  --    LACT  --   --  1.1  --  1.3  --   --    < >  --    PCAL  --   --   --   --  8.66*  --   --   --   --     < > = values in this interval not displayed.       Hepatic Studies    Recent Labs   Lab Test 06/10/23  0341 06/09/23  1305 06/09/23  0413 04/09/23  0658 04/07/23  1150 02/13/23  0903   BILITOTAL 2.1*  --  1.5*   < > 1.0  --    DBIL 1.69*  --  1.23*   < >  --   --    ALKPHOS 198*  --  177*   < > 170*  --    PROTTOTAL 5.0*  --  4.6*   < > 5.9*  --    ALBUMIN 2.8*   < > 2.4*   < > 3.3*  --    *  --  405*   < > 63*  --    *  --  566*   < > 46  --    DEBRA  --   --   --   --  56 93*    < > = values in this interval not displayed.       Pancreatitis testing    Recent Labs   Lab Test 06/07/23  0330 06/05/23  1605 01/23/23  0153 01/15/23  1107 12/16/22  1652 11/22/22  0848   AMYLASE 90 153*  --   --   --   --    LIPASE 59  --  149* 142* 85*  --    TRIG  --   --   --   --   --  96       Gout Labs    No lab results found.    Hematology Studies   Recent Labs   Lab Test 06/10/23  0919  06/10/23  0341 06/09/23  2019 06/09/23  1637 06/09/23  1305 06/09/23  0413 06/08/23  1204 06/08/23  0415   WBC 10.0 8.9  8.9 6.3  --  11.9* 10.9   < > 13.6*   ANEU  --  8.5*  --   --   --   --   --   --    ANEUTAUTO  --   --   --   --   --  10.4*  --  13.2*   ALYM  --  0.0*  --   --   --   --   --   --    ALYMPAUTO  --   --   --   --   --  0.0*  --  0.0*   EUSEBIA  --  0.3  --   --   --   --   --   --    AMONOAUTO  --   --   --   --   --  0.4  --  0.2   AEOS  --  0.0  --   --   --   --   --   --    AEOSAUTO  --   --   --   --   --  0.0  --  0.0   ABSBASO  --   --   --   --   --  0.0  --  0.0   HGB 7.0* 7.6*  7.6* 7.5*  --  7.7* 8.1*   < > 8.5*   HCT 22.8* 24.2*  24.2* 24.4*  --  24.3* 25.9*   < > 26.5*   PLT 71* 74*  74* 47*   < > 54* 52*   < > 60*    < > = values in this interval not displayed.       Clotting Studies    Recent Labs   Lab Test 06/10/23  0341 06/09/23  1305 06/09/23  0413 06/08/23  1619   INR 1.24* 1.38* 1.60* 2.04*   PTT 28 28 31 36       Iron Testing    Recent Labs   Lab Test 06/10/23  0919 12/16/22  1652 11/22/22  0848   IRON  --   --  68   FEB  --   --  219*   IRONSAT  --   --  31   HOLA  --   --  429*   MCV 97   < > 93    < > = values in this interval not displayed.       Markers  No lab results found.    Invalid input(s): FETOPROTEIN, SERUM, AFP    Autoimmune Testing  No lab results found.    Invalid input(s): PRO3, C3, C4, VASPAN    Arterial Blood Gas Testing    Recent Labs   Lab Test 06/10/23  0919 06/07/23  1637 06/07/23  1206 06/07/23  0757 06/07/23  0341 06/07/23  0330 06/07/23  0028   PH  --  7.43 7.44 7.46* 7.39  --  7.33*   PCO2  --  33* 32* 31* 33*  --  40   PO2  --  71* 129* 127* 171*  --  85   HCO3  --  22 22 22 20*  --  21   O2PER 6 21 40 40 50   < > 60.0    < > = values in this interval not displayed.        Thyroid Studies     Recent Labs   Lab Test 04/07/23  1150 12/16/22  1652 11/22/22  0848   TSH 3.37 2.86 4.91*   T4  --   --  1.09       Urine Studies     Recent Labs   Lab Test  06/05/23  1620 04/07/23  1246 02/13/23  0743 01/23/23  0828 01/15/23  1226   URINEPH 5.5 5.5 5.0 6.0 5.5   NITRITE Negative Negative Negative Negative Negative   LEUKEST Negative Negative Negative Negative Negative   WBCU 7* 2 2 8* 3       Medication levels    Recent Labs   Lab Test 06/10/23  0608   TACROL 2.6*       CSF testing   No lab results found.    Invalid input(s): CADAM, EVPCR, ENTPCR, ENTEROVIRUS    Body fluid stats  No lab results found.    Microbiology:  Fungal testing  No lab results found.    Invalid input(s): HIFUN, FUNGL    Last Culture results   Culture   Date Value Ref Range Status   06/09/2023 Positive on the 1st day of incubation (A)  Preliminary   06/09/2023 Gram positive cocci in pairs and chains (AA)  Preliminary     Comment:     1 of 1 bottles   06/09/2023 Positive on the 1st day of incubation (A)  Preliminary   06/09/2023 Gram positive cocci in pairs and chains (AA)  Preliminary     Comment:     2 of 2 bottles   06/05/2023 Enterococcus faecium VRE (A)  Final   05/17/2023 No Growth  Final   04/07/2023 No Growth  Final   04/07/2023 No Growth  Final   02/13/2023 No Growth  Final   02/13/2023 No Growth  Final   01/23/2023 No Growth  Final   01/23/2023 No Growth  Final   12/16/2022 No Growth  Final   12/16/2022 No Growth  Final         Last checks of Clostridioides difficile testing  Recent Labs   Lab Test 04/08/23  2252 01/15/23  1750 12/16/22  1916   CDBPCT Positive* Positive* Positive*   CDIFFGDH Positive* Positive* Positive*   CDIFFTOX Negative Positive* Positive*       Syphilis Testing    Treponema Antibody Total   Date Value Ref Range Status   11/22/2022 Nonreactive Nonreactive Final       Quantiferon testing   Recent Labs   Lab Test 06/10/23  0341 06/09/23  0413 02/13/23  0708 01/27/23  0604 12/16/22  1652 11/22/22  0848   TBRES  --   --   --  Negative  --  Negative   LYMPH 0 0   < >  --    < >  --     < > = values in this interval not displayed.       Infection Studies to assess  Diarrhea  Recent Labs   Lab Test 01/15/23  1750 12/16/22 2028   EPSTX1 Not Detected Not Detected   EPSTX2 Not Detected Not Detected   EPCAMP Not Detected Not Detected   EPSALM Not Detected Not Detected   EPSHGL Not Detected Not Detected   EPVIB Not Detected Not Detected   EPROTA Not Detected Not Detected   EPNORO Not Detected Not Detected   EPYER Not Detected Not Detected       Virology:  Coronavirus-19 testing    Recent Labs   Lab Test 06/05/23  1409 04/07/23  1630 02/13/23  0743 01/23/23  0609 12/16/22 2025 07/29/20  0751   [id number] Negative Negative Negative Negative   < >  --    COVIDPCREXT  --   --   --   --   --  Not Detected    < > = values in this interval not displayed.       Respiratory virus (non-coronavirus-19) testing    Recent Labs   Lab Test 02/13/23  0743   INFZA Negative   INFZB Negative   IRSV Negative       CMV viral loads  No lab results found.    CMV resistance testing  No lab results found.    No results found for: H6RES    No results found for: EBVRESINST, 19504, EBQTC, EBRES, 19498, 84025    No results found for: 51514, BKRES    Parvovirus Testing  No lab results found.    Invalid input(s): PRVRES    Adenovirus Testing  No lab results found.    Invalid input(s): ADENAB, ADENOVIRUS, ADQT    Hepatitis B Testing     Recent Labs   Lab Test 06/08/23  1204 06/08/23  0415 06/05/23  1605 01/28/23  0636 01/26/23  0538 11/22/22  0848   AUSAB  --   --  0.15 0.12  --  0.25   HBCAB  --   --  Nonreactive  --   --  Nonreactive   HEPBANG  --  Nonreactive Reactive*  --    < > Nonreactive   HBEAGN Negative  --   --   --   --   --     < > = values in this interval not displayed.        Hepatitis C Antibody   Date Value Ref Range Status   06/05/2023 Nonreactive Nonreactive Final   11/22/2022 Nonreactive Nonreactive Final       CMV Antibody IgG   Date Value Ref Range Status   06/05/2023 Positive, suggests recent or past exposure. (A) No detectable antibody.  Final   11/22/2022 Positive, suggests recent or  past exposure. (A) No detectable antibody.  Final     CMV Antibody IgM   Date Value Ref Range Status   06/05/2023 Negative Negative Final     EBV Capsid Antibody IgG   Date Value Ref Range Status   06/05/2023 Positive (A) No detectable antibody. Final     Comment:     Suggests recent or past exposure.   11/22/2022 Positive (A) No detectable antibody. Final     Comment:     Suggests recent or past exposure.     EBV Capsid Antibody IgM   Date Value Ref Range Status   06/05/2023 No detectable antibody. No detectable antibody. Final       Imaging:  Recent Results (from the past 48 hour(s))   XR Chest Port 1 View    Narrative    EXAM: XR CHEST PORT 1 VIEW  6/8/2023 4:33 PM      HISTORY: R IJ rewired    COMPARISON: Radiograph 6/6/2023, 623.    FINDINGS: Single view of the chest. Enteric tube courses outside the  field of view and appears to be coiled within the fundus of the  stomach. Stable positioning of left IJ dual-lumen CVC with tip  projecting over the right atrium. Right internal jugular central  venous catheter with tip projecting over the low SVC.    Trachea is midline. Enlarged cardiomediastinal silhouette. Small  bilateral pleural effusions. No pneumothorax. Mixed perihilar and  bibasilar pulmonary opacities      Impression    IMPRESSION:   1. Right internal jugular central venous catheter with tip projecting  over the low SVC. No pneumothorax.  2. Perihilar and bibasilar pulmonary opacities, increased compared to  6/6/2023 radiograph. Findings likely increased atelectasis/edema.  3. Small bilateral pleural effusions.    I have personally reviewed the examination and initial interpretation  and I agree with the findings.    HEBERT MAYERS MD         SYSTEM ID:  [id number]   XR Abdomen Port 1 View    Narrative    Exam: XR ABDOMEN PORT 1 VIEW, 6/9/2023 2:05 PM    Indication: Verify small bowel feeding tube bedside placement    Comparison: X-ray abdomen 6/7/2023, multiple priors.    Findings:   AP supine portable  radiograph of the chest. Partial exclusion of the  left hemiabdomen from the field-of-view. Feeding tube tip projects  over the expected location of the proximal duodenum. Stable  positioning of upper quadrant surgical drain. Postsurgical changes  including abdominal wall skin staples, coils projecting over the left  upper quadrant, surgical clips over the epigastrium and left upper  quadrant. Scattered air-filled loops of bowel. No subdiaphragmatic  air, no portal venous gas. The visualized bones are unremarkable.      Impression    Impression: Feeding tube tip projects over the expected location of  the proximal duodenum.    I have personally reviewed the examination and initial interpretation  and I agree with the findings.    DELONTE CANTU MD         SYSTEM ID:  [id number]   XR Abdomen Port 1 View    Narrative    EXAM: XR ABDOMEN PORT 1 VIEW, 6/10/2023 9:40 AM    INDICATION: poss ileus    COMPARISON: Abdominal radiograph 6/9/2023    FINDINGS:  Portable supine view of the abdomen.. Feeding tube tip projects over  the gastric antrum, slightly retracted compared to prior. Multiple  air-filled prominent similar appearing small bowel loops.  Post  surgical changes of the abdomen including abdominal skin staples and  embolization coils and surgical clips in the upper quadrant.  Mild-to-moderate air-filled distention of the stomach.    Stable position partially visualized support devices. Left basilar  opacities.       Impression    IMPRESSION:     1. Air-filled distended small bowel loops, compatible with adynamic  ileus, similar to prior radiograph. Mild-to-moderate air-filled  distention of the stomach.  2. Feeding tube tip projects over the stomach, slightly retracted  compared to prior radiograph     I have personally reviewed the examination and initial interpretation  and I agree with the findings.    KURTIS JENSEN MD         SYSTEM ID:  [id number]   XR Chest Port 1 View    Narrative    EXAM: XR CHEST PORT 1  VIEW  6/10/2023 8:41 AM     HISTORY:  SOB       COMPARISON:  Chest radiograph 2023    FINDINGS:     Portable semiupright view of the chest. Stable position of right IJ  and left IJ central venous catheters.. Stable enlarged cardiac  silhouette. Left greater than right perihilar and basilar pulmonary  opacities. No pneumothorax. Similar-appearing cardial mediastinal  silhouette. Possible small effusions.    No acute osseous abnormality. Visualized upper abdomen is  unremarkable.        Impression    IMPRESSION:     1. Increased left perihilar and basilar opacities may represent  atelectasis, pulmonary edema or infection with possible component of  loculated fluid  2. stable support devices.    I have personally reviewed the examination and initial interpretation  and I agree with the findings.    KURTIS JENSEN MD         SYSTEM ID:  [id number]   Echocardiogram Limited   Result Value    LVEF  55-60%    Narrative    [id number]  [id number]  [id number]  [id number]^NANCY^YISEL     [facility],[place]  Echocardiography Laboratory  [address] [place], [place] [address]     Name: EMILIANA SCHREIBER  MRN: [id number]  : 1955  Study Date: 06/10/2023 10:36 AM  Age: 67 yrs  Gender: Male  Patient Location: [facility]  Reason For Study: Atrial Fibrillation, CHF  Ordering Physician: YISEL CRUZ  Performed By: Abdulaziz Chiu     BSA: 2.2 m2  Height: 67 in  Weight: 250 lb  HR: 124  BP: 91/59 mmHg  ______________________________________________________________________________  Procedure  Limited Echocardiogram with portions of two-dimensional, color and spectral  Doppler performed. scanned sitting.  ______________________________________________________________________________  Interpretation Summary  Technically difficult study. The patient's rhythm is atrial fibrillation.  Global and regional left ventricular function is normal with an EF of 55-60%.  Global right ventricular function is normal.  No  pericardial effusion is present.  ______________________________________________________________________________  Left Ventricle  Global and regional left ventricular function is normal with an EF of 55-60%.  Mild to moderate concentric wall thickening consistent with left ventricular  hypertrophy is present.     Right Ventricle  Global right ventricular function is normal.     Pericardium  No pericardial effusion is present.     Compared to Previous Study  No significant changes noted.     ______________________________________________________________________________  MMode/2D Measurements & Calculations  IVSd: 1.5 cm  LVIDd: 3.3 cm  LVIDs: 2.4 cm  LVPWd: 1.3 cm     FS: 26.9 %  LV mass(C)d: 149.7 grams  LV mass(C)dI: 67.3 grams/m2  RWT: 0.78     ______________________________________________________________________________  Report approved by: MD Shaggy Castle 06/10/2023 11:49 AM         CT Chest Abdomen Pelvis w/o Contrast    Narrative    CT CHEST ABDOMEN PELVIS W/O CONTRAST 6/10/2023 12:10 PM    History: s/p liver kidney transplant    Comparison: CT abdomen and pelvis 1/23/2023.    Technique: Helical CT acquisition from the lung apices to the pubic  symphysis was obtained without intravenous contrast. Axial, coronal,  and sagittal reconstructions were obtained and reviewed.    Findings:   Devices: Left-sided abdominal drain terminating in the subhepatic  region. Enteric tube and a feeding tube terminating in the stomach.  Left IJV central venous catheter terminating in the right atrium and  right IJV central venous catheter terminating in the SVC. Lux  catheter catheter in the bladder. Nephroureteral stent in the right  lower quadrant transplant kidney.    CHEST:   Lungs: Bilateral small pleural effusions, right more than the left  with adjacent atelectasis. Diffuse bilateral groundglass opacities. No  focal consolidation. No pneumothorax or suspicious pulmonary nodule.  Airways: Central  tracheobronchial tree is clear.  Vessels: Main pulmonary artery and aorta are normal in caliber. Normal  three-vessel arch.  Heart: Heart size is normal without pericardial effusion.  Lymph nodes: No suspicious mediastinal lymphadenopathy.  Thyroid: Within normal limits.  Esophagus: Within normal limits    ABDOMEN PELVIS:  Liver: Postsurgical changes of liver transplantation. Small gas  containing perihepatic collection along the medial aspect of the  caudate lobe measuring 2.6 x 1.3 x 3.1 cm (series 7, image 109). Small  subhepatic fluid collection measuring 4.4 x 1.3 x 1.2 cm (series 7,  image 133).    Biliary system: Gallbladder is surgically absent. No intrahepatic or  extrahepatic biliary ductal dilatation.  Pancreas: No focal mass or dilation of the main pancreatic duct.  Stomach: Within normal limits.  Spleen: Within normal limits.  Adrenal glands: Within normal limits.  Kidneys: Atrophic native kidneys. No hydronephrosis, or stone. Right  lower quadrant transplant kidney is normal in size. Nephroureteral  stent in place with pigtail within the renal pelvis and distal pigtail  in the bladder. Foci of air in the renal collecting system is likely  due to the presence of the stent. No hydronephrosis. No perinephric  fluid collection.  Bladder: Within normal limits. Lux's catheter in situ.  Reproductive organs: Within normal limits.  Colon: Within normal limits.  Small Bowel: Mildly dilated small bowel loops measuring up to 3.9 cm  in diameter without transition point.    Lymph nodes: No intra-abdominal or pelvic lymphadenopathy.  Vasculature: No aortic aneurysm.  Peritoneum: Trace fluid and fat stranding extending from the  perihepatic region, through right perinephric region into the right  paracolic gutter. No pneumoperitoneum.    Soft tissues: Umbilical hernia containing nonobstructed bowel loops.   Bones: No suspicious osseous lesion. Degenerative changes in the  spine. Flowing anterior osteophytes along  the thoracic vertebrae  suggestive of diffuse idiopathic skeletal hyperostosis.      Impression    IMPRESSION:   1. Status post liver and kidney transplantation.   2. Small gas-containing fluid collection medial to the caudate lobe  may be postoperative seroma/hematoma but infection cannot be ruled  out.   3. Trace fluid and fat stranding extending from the perihepatic  region, through right perinephric region into the right paracolic  gutter, expected postoperative changes.  4. Mildly dilated small bowel loops without transition point  suggestive of postoperative ileus. Umbilical hernia containing  nonobstructed bowel loops.  5. Bilateral small pleural effusions, right more than the left, with  adjacent atelectasis. Diffuse mild bilateral groundglass opacities may  be due to atelectasis/edema.     ADRIANA BARROS MD         SYSTEM ID:  [id number]

## 2023-06-10 NOTE — PHARMACY-VANCOMYCIN DOSING SERVICE
"Pharmacy Vancomycin Initial Note  Date of Service Jennifer 10, 2023  Patient's  1955  67 year old, male    Indication: Bacteremia    Current estimated CrCl = Estimated Creatinine Clearance: 62.5 mL/min (A) (based on SCr of 1.42 mg/dL (H)).    Creatinine for last 3 days  2023: 12:08 PM Creatinine 2.19 mg/dL; 12:08 PM Creatinine 2.19 mg/dL;  4:47 PM Creatinine 2.11 mg/dL  2023:  4:15 AM Creatinine 1.84 mg/dL; 12:04 PM Creatinine 1.62 mg/dL;  8:27 PM Creatinine 1.44 mg/dL  2023:  4:13 AM Creatinine 1.61 mg/dL;  1:05 PM Creatinine 1.59 mg/dL;  8:19 PM Creatinine 1.52 mg/dL  6/10/2023:  3:41 AM Creatinine 1.42 mg/dL    Recent Vancomycin Level(s) for last 3 days  No results found for requested labs within last 3 days.      Vancomycin IV Administrations (past 72 hours)                   vancomycin (VANCOCIN) 2,500 mg in sodium chloride 0.9 % 250 mL intermittent infusion (mg) 2,500 mg New Bag 06/10/23 09                Nephrotoxins and other renal medications (From now, onward)    Start     Dose/Rate Route Frequency Ordered Stop    23 09  vancomycin (VANCOCIN) 2,000 mg in sodium chloride 0.9 % 250 mL intermittent infusion         2,000 mg (central catheter)  over 90 Minutes Intravenous EVERY 24 HOURS 06/10/23 0936      06/10/23 09  norepinephrine (LEVOPHED) 16 mg in  mL infusion MAX CONC CENTRAL LINE         0.01-0.6 mcg/kg/min × 111.9 kg (Dosing Weight)  1-62.9 mL/hr  Intravenous CONTINUOUS 06/10/23 0911      06/10/23 0930  vancomycin (VANCOCIN) 2,500 mg in sodium chloride 0.9 % 250 mL intermittent infusion         2,500 mg (central catheter)  over 90 Minutes Intravenous ONCE 06/10/23 0927      06/10/23 0900  piperacillin-tazobactam (ZOSYN) 4.5 g vial to attach to  mL bag        Note to Pharmacy: For [facility], [facility] and [facility]: For Zosyn-naive patients, use the \"Zosyn initial dose + extended infusion\" order panel.    4.5 g  over 30 Minutes Intravenous EVERY 6 HOURS 06/10/23 0834      23 " 1800  tacrolimus (GENERIC EQUIVALENT) capsule 2 mg         2 mg Oral 2 TIMES DAILY. 06/09/23 1146            Contrast Orders - past 72 hours (72h ago, onward)    None            Plan:  1. Start vancomycin 2500 mg IV once followed by 2000 mg q24h.   2. Vancomycin monitoring method: Renal Replacement Therapy  3. Vancomycin therapeutic monitoring goal: 15-20 mg/L  4. Pharmacy will check vancomycin levels as appropriate in 1-3 Days.    5. Serum creatinine levels will be ordered daily for the first week of therapy and at least twice weekly for subsequent weeks.      Sina Hanna, [facility]

## 2023-06-10 NOTE — PROGRESS NOTES
[facility]   Transplant Nephrology Progress Note  Date of Admission:  6/5/2023  Today's Date: 06/10/2023    Recommendations:  -Stop CRRT, remove tunneled line  -Agree with starting broad spectrum abx  -Cardiology consult for Afib w/ RVR. Would like to avoid amiodarone given liver transplant, interaction with tacrolimus.   -Hold metoprolol with hypotension  -Hold rATG. No further doses  -Agree with transplant ID consult.   -Consider NG placement to suction. Concern for ileus  -Low threshold to re-intubate  -MAP>65. 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 Remove 4-6 weeks post transplant.   -Pt taken back to OR on 6/7/23 due to lack of blood flow to the kidney transplant but intra op U/S normal.    -Stop CRRT, remove tunneled line today. 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     # Immunosuppression: Tacrolimus immediate release (goal 5-8), Mycophenolate mofetil (dose 750 mg every 12 hours) and Prednisone (dose taper) to 5mg daily    - Induction with Recent Transplant:  rATG total 4mg/kg, stopped due to sepsis   - Changes: No. Tac held 2/2 DGF per liver transplant team, started 6/9 PM.     # Infection Prophylaxis:   - PJP: Sulfa/TMP (Bactrim)  - CMV: Valganciclovir (Valcyte), CMV IgG Ab positive  - Thrush: Micafungin (Mycamine)  - Fungal: Micafungin (Mycamine)    #  History of C.diff:   -Consider PO vanc with abx    # GPC bacteremia:   -Broad spectrum abx, transplant ID consult   -Remove tunneled line   -Echo (at least TTE and would consider ASHLEY if intubated)    # ANCA vasculitis:   -S/p Rituximab x2   -Obtain intermittent U/A     # Paroxysmal A-Fib: On coumadin and metoprolol ER 25 mg daily PTA. Consider switching to eliquis when kidney and liver are functioning.    -Recommend cardiology consult given hypotension, liver transplant so don't want to use amiodarone.     # Blood pressure: Controlled, but low at times;  Goal BP: MAP > 65   - Volume status: Total body volume up, but intravascularly hypovolemic  EDW ~ 96.4 kg (on HD)   - Changes: Yes - due to hypotension decrease fluid removal to I=O. Consider putting back on pressors for MAP goal>65. Hold metoprolol    # Elevated Blood Glucose: Glucose generally running ~ 160s-200   - Management as per primary team.  On insulin gtt.    # Anemia in Chronic Renal Disease and acute blood loss: Hgb: Stable, low      FEDERICO: No   - Iron studies: Unknown at this time, but checked with dialysis    # Mineral Bone Disorder:   - Secondary renal hyperparathyroidism; PTH level: Minimally elevated ( pg/ml)        On treatment: None  - Vitamin D; level: Not checked recently, but was normal last check        On supplement: No  - Calcium; level: Normal when corrected for low alb       On supplement: No  - Phosphorus; level: High     On binder: No    # Electrolytes:   - Potassium; level: Normal         On supplement: No  - Magnesium; level: High          On supplement: No, modulate with CRRT.  - Bicarbonate; level: Normal 22         On supplement: No  - Sodium; level: Low normal     # Gout:    - On prednisone 10 mg PO daily PTA. Wean prednisone down to 5mg daily and then will consider cortisol stimulation test    # Transplant History:  Etiology of Kidney Failure: IgA nephropathy and ANCA vasculitis  Tx: Liver Tx (ELY)  Transplant: 6/6/2023  (Liver), 6/6/2023 (Kidney)  Significant changes in immunosuppression: None  Significant transplant-related complications: None    Recommendations were communicated to the primary team via this note       Subhash Dutta MD   Pager: [phone]    Interval History   Mr. Miranda creatinine is 1.61 (06/09 0413); Trend down on CRRT.  I/O last 3 completed shifts:  In: 2260.29 [P.O.:100; I.V.:654.29; NG/GT:470]  Out: 2655.4 [Urine:20; Drains:70; Other:2565.4]    Worsening hypotension overnight. Higher HRs with RVR. BlCx w/ GPC in pairs and chains. Remains on CRRT. No longer confused. Feels short of breath    Review of Systems   4 point ROS was obtained and negative except as noted in the Interval History.    MEDICATIONS:    aspirin  300 mg Rectal Once     [Held by provider] aspirin  325 mg Oral Daily     B and C vitamin Complex with folic acid  5 mL Per Feeding Tube Daily     heparin ANTICOAGULANT  5,000 Units Subcutaneous Q12H     linezolid  600 mg Intravenous Q12H     methocarbamol  500 mg Oral 4x Daily     metoprolol  5 mg Intravenous Q4H     [Held by provider] metoprolol tartrate  25 mg Oral Q8H     micafungin  100 mg Intravenous Q24H     mycophenolate  750 mg Oral BID IS     pantoprazole  40 mg Per Feeding Tube QAM AC     piperacillin-tazobactam  4.5 g Intravenous Q6H     polyethylene glycol  17 g Oral Daily     [START ON 6/11/2023] predniSONE  10 mg Oral Daily     protein modular  1 packet Per Feeding Tube TID     sennosides  8.6 mg Oral BID     sodium chloride (PF)  3 mL Intravenous Q8H     sulfamethoxazole-trimethoprim  1 tablet Oral Daily     tacrolimus  2 mg Oral BID IS     ursodiol  250 mg Oral BID     valGANciclovir  450 mg Oral Daily    Or     valGANciclovir  450 mg Oral or NG Tube Daily     [START ON 6/11/2023] vancomycin  2,000 mg (central catheter) Intravenous Q24H       dextrose       dextrose       CRRT replacement solution 12.5 mL/kg/hr (06/10/23 0353)     insulin regular 1.5 Units/hr (06/10/23 0700)     NaCl  "0.45 % 1,000 mL infusion       - MEDICATION INSTRUCTIONS -       norepinephrine 0.03 mcg/kg/min (06/10/23 09)     CRRT replacement solution 200 mL/hr at 06/10/23 0700     CRRT replacement solution 12.5 mL/kg/hr (06/10/23 0353)       Physical Exam   Temp  Av.9  F (36.6  C)  Min: 97  F (36.1  C)  Max: 99.2  F (37.3  C)  Arterial Line BP  Min: 60/45  Max: 131/80  Arterial Line MAP (mmHg)  Av mmHg  Min: 52 mmHg  Max: 101 mmHg      Pulse  Av.5  Min: 68  Max: 159 Resp  Av.8  Min: 14  Max: 23  FiO2 (%)  Av.3 %  Min: 40 %  Max: 50 %  SpO2  Av.1 %  Min: 92 %  Max: 100 %    CVP (mmHg): 6 mmHgBP 91/59   Pulse (!) 122   Temp 97.6  F (36.4  C) (Axillary)   Resp 13   Ht 1.702 m (5' 7\")   Wt 113.8 kg (250 lb 14.1 oz)   SpO2 98%   BMI 39.29 kg/m      Admit Weight: 102.3 kg (225 lb 8 oz)     GENERAL APPEARANCE: no distress  RESP: lungs clear to auscultation - no rales, rhonchi or wheezes  CV: regular rhythm, normal rate, no rub, no murmur  EDEMA: moderate LE edema bilaterally  ABDOMEN: soft, nondistended, nontender, bowel sounds normal  MS: extremities normal - no gross deformities noted, no evidence of inflammation in joints, no muscle tenderness  SKIN: no rash  PSYCH: fatigued  DIALYSIS ACCESS:  Left IJ tunneled catheter    Data   All labs reviewed by me.  CMP  Recent Labs   Lab 06/10/23  0813 06/10/23  0607 06/10/23  0341 06/10/23  0340 23  2221 23  2019 23  1440 23  1305 23  0415 23  0413 23  0418 23  0415 23  0337 23  0330   NA  --   --  135*  --   --  137  --  137  --  135*   < > 138   < > 138   POTASSIUM  --   --  4.8  --   --  4.9  --  5.1  --  5.0   < > 4.6   < > 4.4   CHLORIDE  --   --  102  --   --  104  --  104  --  102   < > 106   < > 105   CO2  --   --  22  --   --  20*  --  22  --  22   < > 21*   < > 17*   ANIONGAP  --   --  11  --   --  13  --  11  --  11   < > 11   < > 16*   * 119* 128* 127*   < > 144*  139*   " < > 136*   < > 131*   < > 113*   < > 167*   BUN  --   --  31.4*  --   --  32.2*  --  32.2*  --  29.0*   < > 28.0*   < > 32.0*   CR  --   --  1.42*  --   --  1.52*  --  1.59*  --  1.61*   < > 1.84*   < > 2.76*   GFRESTIMATED  --   --  55*  --   --  51*  --  48*  --  47*   < > 40*   < > 25*   NAZARIO  --   --  8.0*  --   --  7.6*  --  7.7*  --  8.2*   < > 7.7*   < > 7.6*   MAG  --   --  2.6*  --   --  2.6*  --  2.6*  --  2.7*   < > 2.8*   < > 2.1   PHOS  --   --  5.1*  --   --  5.1*  --  5.4*  --  5.6*   < > 5.5*   < > 4.7*   PROTTOTAL  --   --  5.0*  --   --   --   --   --   --  4.6*  --  4.2*  --  3.4*   ALBUMIN  --   --  2.8*  --   --  2.5*  --  2.4*  --  2.4*   < > 2.3*   < > 2.0*   BILITOTAL  --   --  2.1*  --   --   --   --   --   --  1.5*  --  1.7*  --  1.6*   ALKPHOS  --   --  198*  --   --   --   --   --   --  177*  --  107  --  81   AST  --   --  186*  --   --   --   --   --   --  405*  --  816*  --  1,834*   ALT  --   --  450*  --   --   --   --   --   --  566*  --  692*  --  654*    < > = values in this interval not displayed.     CBC  Recent Labs   Lab 06/10/23  0341 06/09/23 2019 06/09/23  1637 06/09/23  1305 06/09/23  0413   HGB 7.6*  7.6* 7.5*  --  7.7* 8.1*   WBC 8.9  8.9 6.3  --  11.9* 10.9   RBC 2.52*  2.52* 2.52*  --  2.56* 2.71*   HCT 24.2*  24.2* 24.4*  --  24.3* 25.9*   MCV 96  96 97  --  95 96   MCH 30.2  30.2 29.8  --  30.1 29.9   MCHC 31.4*  31.4* 30.7*  --  31.7 31.3*   RDW 17.4*  17.4* 17.5*  --  17.6* 17.7*   PLT 74*  74* 47* 55* 54* 52*     INR  Recent Labs   Lab 06/10/23  0341 06/09/23  1305 06/09/23  0413 06/08/23  1619   INR 1.24* 1.38* 1.60* 2.04*   PTT 28 28 31 36     ABG  Recent Labs   Lab 06/10/23  0919 06/07/23  1637 06/07/23  1206 06/07/23  0757 06/07/23  0341   PH  --  7.43 7.44 7.46* 7.39   PCO2  --  33* 32* 31* 33*   PO2  --  71* 129* 127* 171*   HCO3  --  22 22 22 20*   O2PER 6 21 40 40 50      Urine Studies  Recent Labs   Lab Test 06/05/23  1620 04/07/23  1246  02/13/23  0743 01/23/23  0828   COLOR Yellow Yellow Yellow Light Yellow   APPEARANCE Clear Clear Clear Clear   URINEGLC Negative Negative Negative Negative   URINEBILI Negative Negative Negative Negative   URINEKETONE Negative Negative Negative Negative   SG 1.012 1.011 1.014 1.013   UBLD Moderate* Moderate* Negative Negative   URINEPH 5.5 5.5 5.0 6.0   PROTEIN 10* 10* 10* 20*   NITRITE Negative Negative Negative Negative   LEUKEST Negative Negative Negative Negative   RBCU 2 5* 1 <1   WBCU 7* 2 2 8*     No lab results found.  PTH  Recent Labs   Lab Test 05/19/23  0956   PTHI 67*     Iron Studies  Recent Labs   Lab Test 11/22/22  0848   IRON 68   *   IRONSAT 31   HOLA 429*       IMAGING:  All imaging studies reviewed by me.

## 2023-06-10 NOTE — PROGRESS NOTES
"CRRT STATUS NOTE    DATA:  Time:  6:22 AM  Pressures WNL:  YES  Filter Status:  WDL    Problems Reported/Alarms Noted:  none    Supplies Present:  YES    ASSESSMENT:  Patient Net Fluid Balance:  6/8 -266  6/9 0600 -384  Vital Signs:  Temp: 97.6  F (36.4  C) Temp src: Axillary BP: (!) 88/74 Pulse: 115   Resp: 16 SpO2: 98 % O2 Device: Nasal cannula Oxygen Delivery: 2 LPM Afib  CVP 11    Labs:    K 4.8  Ica 4.3-4.5  plts 47 transfused 74  LA 1.3  Goals of Therapy:  UF  ml/hr to keep off pressors and MAP >65, meeting goals of therapy    INTERVENTIONS:   none    PLAN: Continue per Renal orders.  Call \"CRRT Resource 1\" through Vocera with concerns    "

## 2023-06-10 NOTE — PROGRESS NOTES
Patient seen and examined with our liver team and clinical details discussed with our SICU team.    #1.  Hypotension suspect sepsis.  Would recommend linezolid, meropenem and micafungin.  ID consult would be helpful.  #2.  Atrial fibrillation with RVR: Consultation with cardiology  #3.  Respiratory status: His respiratory status is very precarious, would initiate aspiration precautions and if need be reintubated the patient.  #4.  Hemodynamics: Please try to keep the mean arterial pressure over 60.  He is already on norepinephrine.  #5.  Nutrition: He is got an ileus, would stop the feeding consider TPN.  #6.  Immunosuppression: Hold Thymoglobulin and reduce the CellCept dose to 500 mg iv. twice daily.  This is for sepsis and iv dose for ileus  Overall critical and appreciate the management from SICU.    Transplant Surgery  Inpatient Daily Progress Note  06/10/2023    Assessment & Plan:   67 year old male with PMHx of decompensated alcohol + hemochromatosis (homozygous H63D) cirrhosis complicated by variceal bleeding s/p TIPS (10/1/2022) and hepatic encephalopathy + CKD (IgA nephropathy + ANCA vasculitis) s/p simultaneous liver kidney transplant on 6/6/2023. RTOR on 6/6/203 with concern for low flow in the renal graft - ex-lap, washout, closure with healthy appearing graft with intact arterial and venous flow.     s/p DBD simultaneous liver kidney transplant on 6/6/2023 with complex reconstruction of accessory right hepatic artery. POD #4  * RTOR on 6/6/203 with concern for low flow in the renal graft - ex-lap, washout, closure with healthy appearing graft with intact arterial and venous flow.     Alkaline phosphatase up slightly to 198, and Tbili up to 2.1. AST and ALT downtrending. Lactate normal. INR improving.   Liver US w/doppler 6/7/2023 - low waveforms and low resistive indices, but stable velocities and upstrokes.   Stent: No biliary stent was placed  Drains: 1 MAE drain in place + prevena wound vac  - ASA  325mg daily   - Ursodiol 250mg BID    Immunosuppressed due to medications  - Thymoglobulin: s/p 1mg/kg on , , , . Hold today due to concern for bacteremia and possible sepsis.  - Steroid taper per protocol  - Restarted tacrolimus on 2023, goal 5-8  - MMF 750mg BID    H/o CKD related IgA nephropathy and ANCA vasculitis S/p  donor renal transplant on 2023 with ureteral/J stent placement, delayed graft function  Renal US 2023: patent renal transplant vasculature. Nephrology following. Oliguria.  - Continue CRRT with goal removal per Nephrology    : Lux to remain in place at this time for close urinary output monitoring    Hematology:   Acute on chronic anemia: Hgb 7-9 in the setting of post-operative blood loss. No overt s/sx of blood loss.   Thrombocytopenia: Platelet count: 74  Coagulopathy: INR 1.2, resolved.    Cardiac:   Coronary artery disease: Continue ASA 81mg daily.    * Recommend statin resumption in the outpatient setting.     Paroxysmal atrial fibrillation:  Rate ~150, down to 130 after NG placement.   * Increased metoprolol to 25mg q8h but rate >120 persists   * When INR normalizes, will plan to start heparin gtt    * Per Dr. Clifton: please consult cardiology     Hypotension: Baseline hypotension with concern for sepsis with new finding of bacteremia.   *Midodrine TID   *Pressor restarted this morning    Respiratory:  Acute hypoxic respiratory failure in the setting of post-operative state: Extubated on  and now on 4L O2. Tachypnea this morning resolved with NG placement.  * Continue pulmonary toilet and incentive spirometry    GI/Nutrition:   Hepatitis B s Ag +: Noted to be positive pre-transplant on 23, but not previously positive (2023). HBV DNA negative on 23. No clear at risk behavior.    * Follow up repeat: Hepatitis B s Ag (negative) and hepatitis B DNA (pending)    Mild protein calorie malnutrition: Change to TPN due to  ileus.    Post-operative ileus: Distended stomach and bowel loops noted on AXR this morning. NG placed and TF held.    Bowel regimen: Miralax 17g daily + senna docuate BID    Endocrine:   Post-operative elevated blood glucoses: HgA1c 6.1% (6/6/2023)  - Continue Insulin gtt    Rheumatology:  Psoriatic arthritis: Prior to admission was on prednisone 10mg daily, on prednisone taper as above.   - Goal to discharge on prednisone 5mg; may consider cortisol stimulation test given long term steroid use    Neurologic  Acute post operative pain:    * Methocarbamol 500mg QID    * Oxycodone 2.5 to 5mg q4h PRN    Deconditioning/Weakness: OT and PT optimization     Alcohol use disorder: Continue sober supports post transplant. Continue complete sobriety.    ID  Bacteremia: 6/9 blood cultures + GPCs in pairs/chains, enterococcus faecium per verigene.  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 Zosyn restarted due to concern for sepsis (although tachypnea resolved and tachycardia improved with NG placement). Recommend a line holiday.    Prophylaxis: Mechanical DVT ppx , fall, GI (PPI BID), fungal (micafungin), CMV (valganciclovir, CMV IgG Ab +), PJP (bactrim)    Disposition: SICU    GEORGE/Fellow/Resident Provider: Kim Poe NP     Faculty: Dr. Clifton    __________________________________________________________________  Transplant History:  Transplants:6/6/2023 (Liver), 6/6/2023 (Kidney)   Postoperative day: 4 (Liver), 4 (Kidney)     Interval History:   - Overnight events: Tachycardia AF RVR 150s (improved to 110-130 with NG placement), tachypnea (resolved with NG placement). Vomited this morning prior to NG.      Curent Meds:    aspirin  300 mg Rectal Once     [Held by provider] aspirin  325 mg Oral Daily     B and C vitamin Complex with folic acid  5 mL Per Feeding Tube Daily     heparin ANTICOAGULANT  5,000 Units Subcutaneous Q12H     linezolid  600 mg Intravenous Q12H     methocarbamol  500 mg Oral 4x Daily     metoprolol  5  "mg Intravenous Q4H     [Held by provider] metoprolol tartrate  25 mg Oral Q8H     micafungin  100 mg Intravenous Q24H     mycophenolate  750 mg Oral BID IS     pantoprazole  40 mg Per Feeding Tube QAM AC     piperacillin-tazobactam  4.5 g Intravenous Q6H     polyethylene glycol  17 g Oral Daily     [START ON 6/11/2023] predniSONE  10 mg Oral Daily     protein modular  1 packet Per Feeding Tube TID     sennosides  8.6 mg Oral BID     sodium chloride (PF)  3 mL Intravenous Q8H     sulfamethoxazole-trimethoprim  1 tablet Oral Daily     tacrolimus  2 mg Oral BID IS     ursodiol  250 mg Oral BID     valGANciclovir  450 mg Oral Daily    Or     valGANciclovir  450 mg Oral or NG Tube Daily     [START ON 6/11/2023] vancomycin  2,000 mg (central catheter) Intravenous Q24H       Physical Exam:   Admit Weight: 102.3 kg (225 lb 8 oz)    Current Vitals:   BP 91/59   Pulse (!) 122   Temp 97.6  F (36.4  C) (Axillary)   Resp 13   Ht 1.702 m (5' 7\")   Wt 113.8 kg (250 lb 14.1 oz)   SpO2 98%   BMI 39.29 kg/m      Vital sign ranges:    Temp:  [97.2  F (36.2  C)-98.9  F (37.2  C)] 97.6  F (36.4  C)  Pulse:  [] 122  Resp:  [12-17] 13  BP: ()/(31-86) 91/59  SpO2:  [91 %-100 %] 98 %  Patient Vitals for the past 24 hrs:   BP Temp Temp src Pulse Resp SpO2 Weight   06/10/23 0700 91/59 -- -- (!) 122 13 98 % --   06/10/23 0600 (!) 88/74 -- -- 115 16 98 % --   06/10/23 0500 (!) 86/56 -- -- 120 14 98 % --   06/10/23 0400 (!) 86/72 97.6  F (36.4  C) Axillary (!) 122 16 96 % 113.8 kg (250 lb 14.1 oz)   06/10/23 0300 (!) 89/58 -- -- 117 17 96 % --   06/10/23 0200 94/76 -- -- 119 12 94 % --   06/10/23 0100 94/73 -- -- 98 12 94 % --   06/10/23 0000 91/72 97.9  F (36.6  C) Oral 112 13 95 % --   06/09/23 2300 96/62 -- -- 119 14 96 % --   06/09/23 2230 102/75 98.4  F (36.9  C) -- 112 12 98 % --   06/09/23 2200 92/77 -- -- 117 12 93 % --   06/09/23 2130 (!) 84/59 97.4  F (36.3  C) -- 115 13 98 % --   06/09/23 2112 101/65 98.4  F (36.9 " " C) -- 104 12 97 % --   06/09/23 2100 (!) 85/57 -- -- 116 -- 97 % --   06/09/23 2030 (!) 85/64 -- -- 104 -- 97 % --   06/09/23 2000 (!) 76/62 98  F (36.7  C) Oral 101 14 99 % --   06/09/23 1900 (!) 79/66 -- -- 108 -- 100 % --   06/09/23 1800 (!) 89/64 -- -- 91 -- 99 % --   06/09/23 1745 -- -- -- 96 16 99 % --   06/09/23 1730 -- -- -- 99 -- 100 % --   06/09/23 1715 95/68 -- -- 100 16 97 % --   06/09/23 1700 (!) 87/31 97.8  F (36.6  C) Axillary 94 12 98 % --   06/09/23 1645 -- -- -- 114 -- 99 % --   06/09/23 1630 -- -- -- 92 -- 98 % --   06/09/23 1615 (!) 84/69 -- -- 111 -- 99 % --   06/09/23 1600 95/68 98.9  F (37.2  C) Axillary 109 12 99 % --   06/09/23 1545 -- -- -- (!) 123 -- 99 % --   06/09/23 1530 -- -- -- 118 -- 99 % --   06/09/23 1515 -- -- -- 112 -- 99 % --   06/09/23 1500 (!) 84/76 -- -- 117 16 99 % --   06/09/23 1445 -- -- -- 112 -- 99 % --   06/09/23 1430 -- -- -- 109 -- 100 % --   06/09/23 1415 -- -- -- 115 -- 98 % --   06/09/23 1400 91/74 97.9  F (36.6  C) Axillary 110 -- 100 % --   06/09/23 1345 -- -- -- 102 -- 98 % --   06/09/23 1330 -- -- -- 106 -- 99 % --   06/09/23 1315 -- -- -- 107 -- 99 % --   06/09/23 1300 105/86 -- -- 112 -- 98 % --   06/09/23 1245 -- -- -- 111 -- 93 % --   06/09/23 1230 -- -- -- 103 -- 93 % --   06/09/23 1215 -- -- -- 110 -- 93 % --   06/09/23 1200 97/68 97.2  F (36.2  C) Oral 108 16 93 % --   06/09/23 1145 -- -- -- 114 -- 94 % --   06/09/23 1130 -- -- -- 100 -- 93 % --   06/09/23 1115 -- -- -- 106 -- 93 % --   06/09/23 1100 91/66 -- -- 104 -- 92 % --   06/09/23 1045 -- -- -- 101 -- 91 % --   06/09/23 1030 -- -- -- 107 -- 91 % --     VS:  BP 91/59   Pulse (!) 122   Temp 97.6  F (36.4  C) (Axillary)   Resp 13   Ht 1.702 m (5' 7\")   Wt 113.8 kg (250 lb 14.1 oz)   SpO2 98%   BMI 39.29 kg/m      Wt:   Wt Readings from Last 2 Encounters:   06/10/23 113.8 kg (250 lb 14.1 oz)   05/19/23 103.4 kg (228 lb)      Constitutional: Sleepy at time of exam  Eyes: Conjunctiva " anicteric  CV: AF RVR  Respiratory: breathing comfortably on 4L O2  Abd: Obese. Surgical scar covered with prevena. + MAE drain w/serosang drainage.   Skin: No jaundice  MSK: 2+ generalized edema  Neuro: Alert and oriented earlier, sleeping at time of writer's exam    Examined in AM by Sandra Clifton & Francisco    Data:   CMP  Recent Labs   Lab 06/10/23  0813 06/10/23  0607 06/10/23  0341 06/09/23  2221 06/09/23 2019 06/09/23  0415 06/09/23  0413 06/07/23  0337 06/07/23  0330 06/06/23  0547 06/05/23  1605   NA  --   --  135*  --  137   < > 135*   < > 138   < > 139   POTASSIUM  --   --  4.8  --  4.9   < > 5.0   < > 4.4   < > 4.1   CHLORIDE  --   --  102  --  104   < > 102   < > 105   < > 105   CO2  --   --  22  --  20*   < > 22   < > 17*   < > 21*   * 119* 128*   < > 144*  139*   < > 131*   < > 167*   < > 101*   BUN  --   --  31.4*  --  32.2*   < > 29.0*   < > 32.0*   < > 36.9*   CR  --   --  1.42*  --  1.52*   < > 1.61*   < > 2.76*   < > 3.85*   GFRESTIMATED  --   --  55*  --  51*   < > 47*   < > 25*   < > 17*   NAZARIO  --   --  8.0*  --  7.6*   < > 8.2*   < > 7.6*   < > 8.7*   ICAW  --   --  4.5  --  4.3*   < > 4.6   < >  --    < >  --    MAG  --   --  2.6*  --  2.6*   < > 2.7*   < > 2.1   < > 2.2   PHOS  --   --  5.1*  --  5.1*   < > 5.6*   < > 4.7*   < > 3.4   AMYLASE  --   --   --   --   --   --   --   --  90  --  153*   LIPASE  --   --   --   --   --   --   --   --  59  --   --    ALBUMIN  --   --  2.8*  --  2.5*   < > 2.4*   < > 2.0*   < > 2.9*   BILITOTAL  --   --  2.1*  --   --   --  1.5*   < > 1.6*   < > 0.6   ALKPHOS  --   --  198*  --   --   --  177*   < > 81   < > 207*   AST  --   --  186*  --   --   --  405*   < > 1,834*   < >  --    ALT  --   --  450*  --   --   --  566*   < > 654*   < > 84*    < > = values in this interval not displayed.     CBC  Recent Labs   Lab 06/10/23  0341 06/09/23 2019 06/06/23  2315 06/06/23  2108   HGB 7.6*  7.6* 7.5*   < > 9.1*   WBC 8.9  8.9 6.3   < > 12.3*   PLT  74*  74* 47*   < > 114*   A1C  --   --   --  5.1    < > = values in this interval not displayed.     COAMONIQUE  Recent Labs   Lab 06/10/23  0341 06/09/23  1305   INR 1.24* 1.38*   PTT 28 28

## 2023-06-10 NOTE — CONSULTS
Cardiology Consult                                                               Jennifer 10, 2023  Damion Quinones MRN: [id number]  Age: 67 year old, : 1955        Reason for consult:      Afib        Assessment and Recommendation:       #Atrial fibrillation with RVR  #DDKT  #ANCA vasculitis  #s/p DBD simultaneous liver kidney transplant on 2023 with complex reconstruction of accessory right hepatic artery. POD #4      Overall in the setting of his critical illness, known diagnosis of atrial fibrillation, his RVR right likely represents a state with high sympathetic tone due to his other underlying issues that are currently ongoing, overall at this moment we are in a difficult position in the setting of his RVR and low blood pressures.  Agree with not using amiodarone due to recent liver transplantation, okay to use small doses of oral metoprolol, heart rate goal should be lenient, allowing this of tachycardia in the setting of critical illness.  Would allow heart rates up into the 130s occasionally.  Overall his echo and coronary angiogram did not reveal that there is a significant structural abnormality.    -Continue small dose of PO metoprolol 12.5 mg BID or 25 mg BID if HR is >130 otherwise allow permissive tachycardia  -Continue AC when safe from bleeding prospective for atrial fibrillation       Discussed and seen with Dr. Mendez. We will continue to follow     Jeremy Castillo MD  Cardiology Fellow PGY 6         History of Present Illness:     Complicated 67-year-old gentleman who was admitted with a history of decompensated alcohol and hematochromatosis, cirrhosis and variceal bleeding.  He would go on to develop CKD, he underwent simultaneous liver kidney transplant on .  He returned to the OR on  for concerns of low flow in the renal graft, he is currently on CRRT.  Cardiology is being consulted for A-fib with RVR.  Has history of atrial fibrillation, echocardiogram today performed  shows significant atrial enlargement but no significant LV dysfunction.  No prior diabetes, hypertension.  Coronary angiogram showing a  of the RCA.  History is limited today due to his encephalopathy, although he does report that he is feeling somewhat short of breath.    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)     Esophageal varices in cirrhosis (H)     Essential hypertension     Family history of colon cancer     Family history of prostate cancer     GAVE (gastric antral vascular ectasia)     Hereditary hemochromatosis (H)     Other hyperlipidemia     Psoriasis     S/P TIPS (transjugular intrahepatic portosystemic shunt)     Tophaceous gout     Deep vein thrombosis (DVT) of non-extremity vein, unspecified chronicity     Moderate protein-calorie malnutrition (H)     Alcohol use, unspecified with unspecified alcohol-induced disorder (H)     Acute deep vein thrombosis (DVT) of right lower extremity, unspecified vein (H)     Encephalopathy     Pre-operative cardiovascular examination     CKD (chronic kidney disease) stage 5, GFR less than 15 ml/min (H)     Status post coronary angiogram     Organ transplant candidate     Recurrent Clostridioides difficile diarrhea     Chronic atrial fibrillation (H)     Physical deconditioning     Liver transplant candidate     Liver transplant recipient (H)         Past Surgical History:      Past Surgical History:   Procedure Laterality Date     APPENDECTOMY      Removed at 18 Years Old      BENCH KIDNEY  6/6/2023    Procedure: Bench kidney;  Surgeon: Etienne  MD Chad;  Location: [facility]     BENCH LIVER  2023    Procedure: Bench liver;  Surgeon: Chad Clifton MD;  Location:  OR     COLONOSCOPY      2014 at [facility].      CV CORONARY ANGIOGRAM N/A 2023    Procedure: Coronary Angiogram;  Surgeon: Pablo Araujo MD;  Location:  HE[facility] CARD[facility] CATH LAB     EYE SURGERY Bilateral     Cataract     HERNIA REPAIR      History of bilateral inguinal hernia repair: 10/28/2014. Open hernia repair: 10/2017. Abdominal wound exploration and debridement 2017     INSERT SHUNT PORTAL TRANSJUGULAR INTRAHEPTIC  2022     IR CVC TUNNEL PLACEMENT > 5 YRS OF AGE  2023     RETURN LIVER TRANSPLANT N/A 2023    Procedure: Return liver transplant. Intra-operative ultrasound;  Surgeon: Chad Clifton MD;  Location: [facility] OR     TRANSPLANT KIDNEY RECIPIENT  DONOR N/A 2023    Procedure: Transplant kidney recipient  donor, ureteral stent placement;  Surgeon: Chad Clifton MD;  Location: [facility] OR     TRANSPLANT LIVER RECIPIENT  DONOR N/A 2023    Procedure: Transplant liver recipient  donor;  Surgeon: Chad Clifton MD;  Location:  OR         Social History:     Social History     Socioeconomic History     Marital status:      Spouse name: Not on file     Number of children: Not on file     Years of education: Not on file     Highest education level: Not on file   Occupational History     Not on file   Tobacco Use     Smoking status: Never     Passive exposure: Never     Smokeless tobacco: Never   Vaping Use     Vaping status: Never Used   Substance and Sexual Activity     Alcohol use: Not Currently     Comment: Last ETOH use was 2021     Drug use: Not Currently     Sexual activity: Not on file   Other Topics Concern     Parent/sibling w/ CABG, MI or angioplasty before 65F 55M? Not Asked   Social History Narrative     Not on file     Social Determinants of Health      Financial Resource Strain: Not on file   Food Insecurity: Not on file   Transportation Needs: Not on file   Physical Activity: Not on file   Stress: Not on file   Social Connections: Not on file   Intimate Partner Violence: Not on file   Housing Stability: Not on file         Family History:     Family History   Problem Relation Age of Onset     Lung Cancer Mother      Colon Cancer Father      Lung Cancer Brother      Heart Failure Brother      Kidney Disease Brother          Allergies:     No Known Allergies      Medications:     No current facility-administered medications on file prior to encounter.  aspirin 81 MG EC tablet, Take 1 tablet (81 mg) by mouth daily  atorvastatin (LIPITOR) 40 MG tablet, Take 1 tablet (40 mg) by mouth daily  B Complex-C-Folic Acid (MAKEDA-KENISHA) TABS, Take 1 tablet by mouth daily  bumetanide (BUMEX) 1 MG tablet, Take 2 tablets (2 mg) by mouth 2 times daily for 180 days  Calcium Carbonate-Vitamin D 500-3.125 MG-MCG TABS, Take 1 tablet by mouth 2 times daily  folic acid (FOLVITE) 1 MG tablet, Take 5 mg by mouth daily  lactulose (CHRONULAC) 10 GM/15ML solution, Take 20 g by mouth 2 times daily  metoprolol succinate ER (TOPROL XL) 25 MG 24 hr tablet, Take 1 tablet by mouth daily at 2 pm  midodrine (PROAMATINE) 2.5 MG tablet, Take 1 tablet (2.5 mg) by mouth daily (Patient taking differently: Take 2.5 mg by mouth daily as needed (hypotension during dialysis))  omeprazole 20 MG tablet, Take 2 tablets (40 mg) by mouth 2 times daily (Patient taking differently: Take 40 mg by mouth daily)  predniSONE (DELTASONE) 10 MG tablet, Take 10 mg by mouth daily  spironolactone (ALDACTONE) 25 MG tablet, Take 25 mg by mouth daily Start when OK from nephrology  XIFAXAN 550 MG TABS tablet, Take 550 mg by mouth 2 times daily  zinc gluconate 50 MG tablet, Take 50 mg by mouth daily  zinc oxide (DESITIN) 40 % paste, Apply topically as needed for irritation apply to buttock to prevent bleeding with  wiping  ondansetron (ZOFRAN ODT) 4 MG ODT tab, Take 1 tablet by mouth every 8 hours as needed            Physical Exam:     B/P: 93/60, T: 98.6, P: 115, R: 22    Wt Readings from Last 4 Encounters:   06/10/23 113.8 kg (250 lb 14.1 oz)   05/19/23 103.4 kg (228 lb)   05/17/23 104.3 kg (230 lb)   05/11/23 99.8 kg (220 lb)         Intake/Output Summary (Last 24 hours) at 6/10/2023 1307  Last data filed at 6/10/2023 1100  Gross per 24 hour   Intake 1712.79 ml   Output 2433.6 ml   Net -720.81 ml       Gen: Appears acutely ill in bed   HEENT:AT/ NC,   PULM/THORAX: Crackles bilateral anterior lung fields   CV: irregular rhythm and rate, no murmur or rubs   ABD: Distended   EXT: Warm well perfused       Data:     Labs Reviewed on Admission    Troponin No results found for: TROPI, TROPONIN, TROPR, TROPN  [facility]  Recent Labs   Lab 06/10/23  1240 06/10/23  1018 06/10/23  0813 06/10/23  0607 06/10/23  0341 06/09/23  2221 06/09/23  2019 06/09/23  1440 06/09/23  1305 06/09/23  0415 06/09/23  0413   NA  --   --   --   --  135*  --  137  --  137  --  135*   POTASSIUM  --   --   --   --  4.8  --  4.9  --  5.1  --  5.0   CHLORIDE  --   --   --   --  102  --  104  --  104  --  102   NAZARIO  --   --   --   --  8.0*  --  7.6*  --  7.7*  --  8.2*   CO2  --   --   --   --  22  --  20*  --  22  --  22   BUN  --   --   --   --  31.4*  --  32.2*  --  32.2*  --  29.0*   CR  --   --   --   --  1.42*  --  1.52*  --  1.59*  --  1.61*   * 129* 115* 119* 128*   < > 144*  139*   < > 136*   < > 131*    < > = values in this interval not displayed.     CBC  Recent Labs   Lab 06/10/23  0919 06/10/23  0341 06/09/23 2019 06/09/23  1637 06/09/23  1305   WBC 10.0 8.9  8.9 6.3  --  11.9*   RBC 2.34* 2.52*  2.52* 2.52*  --  2.56*   HGB 7.0* 7.6*  7.6* 7.5*  --  7.7*   HCT 22.8* 24.2*  24.2* 24.4*  --  24.3*   MCV 97 96  96 97  --  95   MCH 29.9 30.2  30.2 29.8  --  30.1   MCHC 30.7* 31.4*  31.4* 30.7*  --  31.7   RDW 17.5* 17.4*  17.4* 17.5*  --   17.6*   PLT 71* 74*  74* 47* 55* 54*     INR  Recent Labs   Lab 06/10/23  0341 06/09/23  1305 06/09/23  0413 06/08/23  1619   INR 1.24* 1.38* 1.60* 2.04*      Hepatic Panel   Lab Results   Component Value Date     06/10/2023     Lab Results   Component Value Date     06/10/2023     No results found for: BILICONJ   Lab Results   Component Value Date    BILITOTAL 2.1 06/10/2023     Lab Results   Component Value Date    ALBUMIN 2.8 06/10/2023     Lab Results   Component Value Date    PROTTOTAL 5.0 06/10/2023      Lab Results   Component Value Date    ALKPHOS 198 06/10/2023           Most Recent Imaging:     EKG: Afib RVR non specific st t wave changes       Echo: Interpretation Summary  Technically difficult study. The patient's rhythm is atrial fibrillation.  Global and regional left ventricular function is normal with an EF of 55-60%.  Global right ventricular function is normal.  No pericardial effusion is present.

## 2023-06-11 ENCOUNTER — APPOINTMENT (OUTPATIENT)
Dept: GENERAL RADIOLOGY | Facility: CLINIC | Age: 68
End: 2023-06-11
Attending: TRANSPLANT SURGERY
Payer: COMMERCIAL

## 2023-06-11 ENCOUNTER — APPOINTMENT (OUTPATIENT)
Dept: GENERAL RADIOLOGY | Facility: CLINIC | Age: 68
End: 2023-06-11
Attending: INTERNAL MEDICINE
Payer: COMMERCIAL

## 2023-06-11 ENCOUNTER — APPOINTMENT (OUTPATIENT)
Dept: ULTRASOUND IMAGING | Facility: CLINIC | Age: 68
End: 2023-06-11
Attending: NURSE PRACTITIONER
Payer: COMMERCIAL

## 2023-06-11 ENCOUNTER — APPOINTMENT (OUTPATIENT)
Dept: PHYSICAL THERAPY | Facility: CLINIC | Age: 68
End: 2023-06-11
Attending: INTERNAL MEDICINE
Payer: COMMERCIAL

## 2023-06-11 LAB
ALBUMIN SERPL BCG-MCNC: 2.4 G/DL (ref 3.5–5.2)
ALLEN'S TEST: ABNORMAL
ALLEN'S TEST: ABNORMAL
ALP SERPL-CCNC: 151 U/L (ref 40–129)
ALT SERPL W P-5'-P-CCNC: 264 U/L (ref 10–50)
ANION GAP SERPL CALCULATED.3IONS-SCNC: 15 MMOL/L (ref 7–15)
APTT PPP: 29 SECONDS (ref 22–38)
APTT PPP: 30 SECONDS (ref 22–38)
AST SERPL W P-5'-P-CCNC: 105 U/L (ref 10–50)
BASE EXCESS BLDA CALC-SCNC: -4 MMOL/L (ref -9–1.8)
BASE EXCESS BLDA CALC-SCNC: -4.8 MMOL/L (ref -9–1.8)
BASOPHILS # BLD AUTO: 0 10E3/UL (ref 0–0.2)
BASOPHILS NFR BLD AUTO: 0 %
BILIRUB DIRECT SERPL-MCNC: 4.78 MG/DL (ref 0–0.3)
BILIRUB SERPL-MCNC: 4.9 MG/DL
BUN SERPL-MCNC: 48 MG/DL (ref 8–23)
C DIFF TOX B STL QL: NEGATIVE
CA-I BLD-MCNC: 4.2 MG/DL (ref 4.4–5.2)
CALCIUM SERPL-MCNC: 7.8 MG/DL (ref 8.8–10.2)
CHLORIDE SERPL-SCNC: 102 MMOL/L (ref 98–107)
CREAT SERPL-MCNC: 2.58 MG/DL (ref 0.67–1.17)
DEPRECATED HCO3 PLAS-SCNC: 20 MMOL/L (ref 22–29)
EOSINOPHIL # BLD AUTO: 0 10E3/UL (ref 0–0.7)
EOSINOPHIL NFR BLD AUTO: 0 %
ERYTHROCYTE [DISTWIDTH] IN BLOOD BY AUTOMATED COUNT: 16.6 % (ref 10–15)
FIBRINOGEN PPP-MCNC: 520 MG/DL (ref 170–490)
FIBRINOGEN PPP-MCNC: 611 MG/DL (ref 170–490)
GFR SERPL CREATININE-BSD FRML MDRD: 27 ML/MIN/1.73M2
GLUCOSE BLDC GLUCOMTR-MCNC: 110 MG/DL (ref 70–99)
GLUCOSE BLDC GLUCOMTR-MCNC: 120 MG/DL (ref 70–99)
GLUCOSE BLDC GLUCOMTR-MCNC: 122 MG/DL (ref 70–99)
GLUCOSE BLDC GLUCOMTR-MCNC: 124 MG/DL (ref 70–99)
GLUCOSE BLDC GLUCOMTR-MCNC: 131 MG/DL (ref 70–99)
GLUCOSE BLDC GLUCOMTR-MCNC: 132 MG/DL (ref 70–99)
GLUCOSE BLDC GLUCOMTR-MCNC: 132 MG/DL (ref 70–99)
GLUCOSE BLDC GLUCOMTR-MCNC: 136 MG/DL (ref 70–99)
GLUCOSE BLDC GLUCOMTR-MCNC: 146 MG/DL (ref 70–99)
GLUCOSE BLDC GLUCOMTR-MCNC: 160 MG/DL (ref 70–99)
GLUCOSE SERPL-MCNC: 120 MG/DL (ref 70–99)
HCO3 BLD-SCNC: 21 MMOL/L (ref 21–28)
HCO3 BLD-SCNC: 21 MMOL/L (ref 21–28)
HCT VFR BLD AUTO: 22.6 % (ref 40–53)
HGB BLD-MCNC: 7.2 G/DL (ref 13.3–17.7)
IMM GRANULOCYTES # BLD: 0.3 10E3/UL
IMM GRANULOCYTES NFR BLD: 4 %
INR PPP: 1.29 (ref 0.85–1.15)
INR PPP: 1.3 (ref 0.85–1.15)
LACTATE SERPL-SCNC: 0.6 MMOL/L (ref 0.7–2)
LYMPHOCYTES # BLD AUTO: 0 10E3/UL (ref 0.8–5.3)
LYMPHOCYTES NFR BLD AUTO: 0 %
MAGNESIUM SERPL-MCNC: 2.6 MG/DL (ref 1.7–2.3)
MCH RBC QN AUTO: 30 PG (ref 26.5–33)
MCHC RBC AUTO-ENTMCNC: 31.9 G/DL (ref 31.5–36.5)
MCV RBC AUTO: 94 FL (ref 78–100)
MONOCYTES # BLD AUTO: 0.2 10E3/UL (ref 0–1.3)
MONOCYTES NFR BLD AUTO: 2 %
NEUTROPHILS # BLD AUTO: 6.7 10E3/UL (ref 1.6–8.3)
NEUTROPHILS NFR BLD AUTO: 94 %
NRBC # BLD AUTO: 0.3 10E3/UL
NRBC BLD AUTO-RTO: 4 /100
O2/TOTAL GAS SETTING VFR VENT: 2 %
O2/TOTAL GAS SETTING VFR VENT: 5 %
OXYHGB MFR BLD: 97 % (ref 92–100)
PCO2 BLD: 39 MM HG (ref 35–45)
PCO2 BLD: 39 MM HG (ref 35–45)
PH BLD: 7.34 [PH] (ref 7.35–7.45)
PH BLD: 7.34 [PH] (ref 7.35–7.45)
PHOSPHATE SERPL-MCNC: 5.7 MG/DL (ref 2.5–4.5)
PLATELET # BLD AUTO: 44 10E3/UL (ref 150–450)
PLATELET # BLD AUTO: 47 10E3/UL (ref 150–450)
PO2 BLD: 124 MM HG (ref 80–105)
PO2 BLD: 128 MM HG (ref 80–105)
POTASSIUM SERPL-SCNC: 4.9 MMOL/L (ref 3.4–5.3)
PROT SERPL-MCNC: 4.5 G/DL (ref 6.4–8.3)
RBC # BLD AUTO: 2.4 10E6/UL (ref 4.4–5.9)
SODIUM SERPL-SCNC: 137 MMOL/L (ref 136–145)
TACROLIMUS BLD-MCNC: 5.3 UG/L (ref 5–15)
TME LAST DOSE: NORMAL H
TME LAST DOSE: NORMAL H
WBC # BLD AUTO: 7.2 10E3/UL (ref 4–11)

## 2023-06-11 PROCEDURE — 87493 C DIFF AMPLIFIED PROBE: CPT | Performed by: STUDENT IN AN ORGANIZED HEALTH CARE EDUCATION/TRAINING PROGRAM

## 2023-06-11 PROCEDURE — 99291 CRITICAL CARE FIRST HOUR: CPT | Mod: 24 | Performed by: SURGERY

## 2023-06-11 PROCEDURE — 250N000011 HC RX IP 250 OP 636

## 2023-06-11 PROCEDURE — 85384 FIBRINOGEN ACTIVITY: CPT | Performed by: TRANSPLANT SURGERY

## 2023-06-11 PROCEDURE — 85025 COMPLETE CBC W/AUTO DIFF WBC: CPT | Performed by: SURGERY

## 2023-06-11 PROCEDURE — 999N000065 XR ABDOMEN PORT 1 VIEW

## 2023-06-11 PROCEDURE — 99232 SBSQ HOSP IP/OBS MODERATE 35: CPT | Mod: 24 | Performed by: TRANSPLANT SURGERY

## 2023-06-11 PROCEDURE — 76776 US EXAM K TRANSPL W/DOPPLER: CPT | Mod: 26 | Performed by: RADIOLOGY

## 2023-06-11 PROCEDURE — 258N000003 HC RX IP 258 OP 636: Performed by: SURGERY

## 2023-06-11 PROCEDURE — 250N000011 HC RX IP 250 OP 636: Performed by: STUDENT IN AN ORGANIZED HEALTH CARE EDUCATION/TRAINING PROGRAM

## 2023-06-11 PROCEDURE — 74018 RADEX ABDOMEN 1 VIEW: CPT | Mod: 77

## 2023-06-11 PROCEDURE — 250N000009 HC RX 250: Performed by: TRANSPLANT SURGERY

## 2023-06-11 PROCEDURE — 85730 THROMBOPLASTIN TIME PARTIAL: CPT | Performed by: TRANSPLANT SURGERY

## 2023-06-11 PROCEDURE — 258N000003 HC RX IP 258 OP 636: Performed by: TRANSPLANT SURGERY

## 2023-06-11 PROCEDURE — 83605 ASSAY OF LACTIC ACID: CPT

## 2023-06-11 PROCEDURE — 250N000012 HC RX MED GY IP 250 OP 636 PS 637: Performed by: STUDENT IN AN ORGANIZED HEALTH CARE EDUCATION/TRAINING PROGRAM

## 2023-06-11 PROCEDURE — 36415 COLL VENOUS BLD VENIPUNCTURE: CPT | Performed by: TRANSPLANT SURGERY

## 2023-06-11 PROCEDURE — 84100 ASSAY OF PHOSPHORUS: CPT | Performed by: SURGERY

## 2023-06-11 PROCEDURE — 250N000013 HC RX MED GY IP 250 OP 250 PS 637: Performed by: STUDENT IN AN ORGANIZED HEALTH CARE EDUCATION/TRAINING PROGRAM

## 2023-06-11 PROCEDURE — 85610 PROTHROMBIN TIME: CPT | Performed by: TRANSPLANT SURGERY

## 2023-06-11 PROCEDURE — 99232 SBSQ HOSP IP/OBS MODERATE 35: CPT | Mod: GC | Performed by: INTERNAL MEDICINE

## 2023-06-11 PROCEDURE — 74018 RADEX ABDOMEN 1 VIEW: CPT | Mod: 26 | Performed by: RADIOLOGY

## 2023-06-11 PROCEDURE — 76776 US EXAM K TRANSPL W/DOPPLER: CPT

## 2023-06-11 PROCEDURE — 97530 THERAPEUTIC ACTIVITIES: CPT | Mod: GP | Performed by: PHYSICAL THERAPIST

## 2023-06-11 PROCEDURE — 200N000002 HC R&B ICU UMMC

## 2023-06-11 PROCEDURE — 250N000011 HC RX IP 250 OP 636: Performed by: SURGERY

## 2023-06-11 PROCEDURE — 83735 ASSAY OF MAGNESIUM: CPT | Performed by: SURGERY

## 2023-06-11 PROCEDURE — 250N000009 HC RX 250

## 2023-06-11 PROCEDURE — 258N000003 HC RX IP 258 OP 636: Performed by: STUDENT IN AN ORGANIZED HEALTH CARE EDUCATION/TRAINING PROGRAM

## 2023-06-11 PROCEDURE — 250N000011 HC RX IP 250 OP 636: Performed by: TRANSPLANT SURGERY

## 2023-06-11 PROCEDURE — 250N000009 HC RX 250: Performed by: STUDENT IN AN ORGANIZED HEALTH CARE EDUCATION/TRAINING PROGRAM

## 2023-06-11 PROCEDURE — 85049 AUTOMATED PLATELET COUNT: CPT | Performed by: TRANSPLANT SURGERY

## 2023-06-11 PROCEDURE — 82805 BLOOD GASES W/O2 SATURATION: CPT | Performed by: STUDENT IN AN ORGANIZED HEALTH CARE EDUCATION/TRAINING PROGRAM

## 2023-06-11 PROCEDURE — 82803 BLOOD GASES ANY COMBINATION: CPT | Performed by: PHYSICIAN ASSISTANT

## 2023-06-11 PROCEDURE — 82248 BILIRUBIN DIRECT: CPT | Performed by: SURGERY

## 2023-06-11 PROCEDURE — 94799 UNLISTED PULMONARY SVC/PX: CPT

## 2023-06-11 PROCEDURE — 999N000157 HC STATISTIC RCP TIME EA 10 MIN

## 2023-06-11 PROCEDURE — 82330 ASSAY OF CALCIUM: CPT | Performed by: SURGERY

## 2023-06-11 PROCEDURE — 87040 BLOOD CULTURE FOR BACTERIA: CPT | Performed by: TRANSPLANT SURGERY

## 2023-06-11 PROCEDURE — 80197 ASSAY OF TACROLIMUS: CPT | Performed by: NURSE PRACTITIONER

## 2023-06-11 PROCEDURE — C9113 INJ PANTOPRAZOLE SODIUM, VIA: HCPCS | Performed by: TRANSPLANT SURGERY

## 2023-06-11 PROCEDURE — 99233 SBSQ HOSP IP/OBS HIGH 50: CPT | Mod: 24 | Performed by: INTERNAL MEDICINE

## 2023-06-11 RX ORDER — METHYLPREDNISOLONE SODIUM SUCCINATE 40 MG/ML
8 INJECTION, POWDER, LYOPHILIZED, FOR SOLUTION INTRAMUSCULAR; INTRAVENOUS ONCE
Status: COMPLETED | OUTPATIENT
Start: 2023-06-11 | End: 2023-06-11

## 2023-06-11 RX ORDER — FENTANYL CITRATE 50 UG/ML
12.5 INJECTION, SOLUTION INTRAMUSCULAR; INTRAVENOUS
Status: DISCONTINUED | OUTPATIENT
Start: 2023-06-11 | End: 2023-06-14

## 2023-06-11 RX ORDER — BUMETANIDE 0.25 MG/ML
3 INJECTION INTRAMUSCULAR; INTRAVENOUS ONCE
Status: COMPLETED | OUTPATIENT
Start: 2023-06-11 | End: 2023-06-11

## 2023-06-11 RX ORDER — ASPIRIN 300 MG/1
300 SUPPOSITORY RECTAL DAILY
Status: DISCONTINUED | OUTPATIENT
Start: 2023-06-11 | End: 2023-06-13

## 2023-06-11 RX ORDER — METOLAZONE 5 MG/1
5 TABLET ORAL ONCE
Status: DISCONTINUED | OUTPATIENT
Start: 2023-06-11 | End: 2023-06-11

## 2023-06-11 RX ORDER — DEXTROSE MONOHYDRATE 25 G/50ML
25-50 INJECTION, SOLUTION INTRAVENOUS
Status: DISCONTINUED | OUTPATIENT
Start: 2023-06-11 | End: 2023-06-11

## 2023-06-11 RX ORDER — CHLOROTHIAZIDE SODIUM 500 MG/1
500 INJECTION INTRAVENOUS ONCE
Status: COMPLETED | OUTPATIENT
Start: 2023-06-11 | End: 2023-06-11

## 2023-06-11 RX ORDER — NICOTINE POLACRILEX 4 MG
15-30 LOZENGE BUCCAL
Status: DISCONTINUED | OUTPATIENT
Start: 2023-06-11 | End: 2023-06-11

## 2023-06-11 RX ORDER — BISACODYL 10 MG
10 SUPPOSITORY, RECTAL RECTAL ONCE
Status: COMPLETED | OUTPATIENT
Start: 2023-06-11 | End: 2023-06-11

## 2023-06-11 RX ADMIN — NOREPINEPHRINE BITARTRATE 0.05 MCG/KG/MIN: 1 INJECTION, SOLUTION, CONCENTRATE INTRAVENOUS at 08:16

## 2023-06-11 RX ADMIN — BUMETANIDE 3 MG: 0.25 INJECTION INTRAMUSCULAR; INTRAVENOUS at 16:37

## 2023-06-11 RX ADMIN — TACROLIMUS 1 MG: 1 CAPSULE ORAL at 08:18

## 2023-06-11 RX ADMIN — METOPROLOL TARTRATE 5 MG: 5 INJECTION INTRAVENOUS at 19:37

## 2023-06-11 RX ADMIN — LINEZOLID 600 MG: 600 INJECTION, SOLUTION INTRAVENOUS at 21:50

## 2023-06-11 RX ADMIN — PROCHLORPERAZINE EDISYLATE 5 MG: 5 INJECTION INTRAMUSCULAR; INTRAVENOUS at 04:25

## 2023-06-11 RX ADMIN — METOPROLOL TARTRATE 5 MG: 5 INJECTION INTRAVENOUS at 16:44

## 2023-06-11 RX ADMIN — ASPIRIN 300 MG: 300 SUPPOSITORY RECTAL at 12:32

## 2023-06-11 RX ADMIN — HYDROMORPHONE HYDROCHLORIDE 0.2 MG: 0.2 INJECTION, SOLUTION INTRAMUSCULAR; INTRAVENOUS; SUBCUTANEOUS at 04:25

## 2023-06-11 RX ADMIN — TACROLIMUS 1 MG: 1 CAPSULE ORAL at 19:48

## 2023-06-11 RX ADMIN — BISACODYL 10 MG: 10 SUPPOSITORY RECTAL at 16:25

## 2023-06-11 RX ADMIN — PIPERACILLIN AND TAZOBACTAM 2.25 G: 2; .25 INJECTION, POWDER, FOR SOLUTION INTRAVENOUS at 03:43

## 2023-06-11 RX ADMIN — METHYLPREDNISOLONE SODIUM SUCCINATE 8 MG: 40 INJECTION, POWDER, FOR SOLUTION INTRAMUSCULAR; INTRAVENOUS at 10:21

## 2023-06-11 RX ADMIN — CHLOROTHIAZIDE SODIUM 500 MG: 500 INJECTION, POWDER, LYOPHILIZED, FOR SOLUTION INTRAVENOUS at 17:19

## 2023-06-11 RX ADMIN — MICAFUNGIN SODIUM 100 MG: 50 INJECTION, POWDER, LYOPHILIZED, FOR SOLUTION INTRAVENOUS at 01:08

## 2023-06-11 RX ADMIN — HEPARIN SODIUM 5000 UNITS: 5000 INJECTION, SOLUTION INTRAVENOUS; SUBCUTANEOUS at 07:55

## 2023-06-11 RX ADMIN — METOPROLOL TARTRATE 5 MG: 5 INJECTION INTRAVENOUS at 12:59

## 2023-06-11 RX ADMIN — HEPARIN SODIUM 5000 UNITS: 5000 INJECTION, SOLUTION INTRAVENOUS; SUBCUTANEOUS at 19:37

## 2023-06-11 RX ADMIN — PIPERACILLIN AND TAZOBACTAM 2.25 G: 2; .25 INJECTION, POWDER, FOR SOLUTION INTRAVENOUS at 20:09

## 2023-06-11 RX ADMIN — MYCOPHENOLATE MOFETIL 500 MG: 500 INJECTION, POWDER, LYOPHILIZED, FOR SOLUTION INTRAVENOUS at 07:55

## 2023-06-11 RX ADMIN — MYCOPHENOLATE MOFETIL 500 MG: 500 INJECTION, POWDER, LYOPHILIZED, FOR SOLUTION INTRAVENOUS at 19:49

## 2023-06-11 RX ADMIN — PIPERACILLIN AND TAZOBACTAM 2.25 G: 2; .25 INJECTION, POWDER, FOR SOLUTION INTRAVENOUS at 08:00

## 2023-06-11 RX ADMIN — METOPROLOL TARTRATE 5 MG: 5 INJECTION INTRAVENOUS at 23:25

## 2023-06-11 RX ADMIN — METOPROLOL TARTRATE 5 MG: 5 INJECTION INTRAVENOUS at 07:54

## 2023-06-11 RX ADMIN — METOPROLOL TARTRATE 5 MG: 5 INJECTION INTRAVENOUS at 03:43

## 2023-06-11 RX ADMIN — PANTOPRAZOLE SODIUM 40 MG: 40 INJECTION, POWDER, FOR SOLUTION INTRAVENOUS at 10:20

## 2023-06-11 RX ADMIN — PIPERACILLIN AND TAZOBACTAM 2.25 G: 2; .25 INJECTION, POWDER, FOR SOLUTION INTRAVENOUS at 14:53

## 2023-06-11 RX ADMIN — LINEZOLID 600 MG: 600 INJECTION, SOLUTION INTRAVENOUS at 10:51

## 2023-06-11 ASSESSMENT — ACTIVITIES OF DAILY LIVING (ADL)
ADLS_ACUITY_SCORE: 39
ADLS_ACUITY_SCORE: 38
ADLS_ACUITY_SCORE: 38
ADLS_ACUITY_SCORE: 39
ADLS_ACUITY_SCORE: 38
ADLS_ACUITY_SCORE: 39
ADLS_ACUITY_SCORE: 38

## 2023-06-11 NOTE — PROGRESS NOTES
SURGICAL ICU PROGRESS NOTE  2023      PRIMARY TEAM: Transplant Surgery  PRIMARY PHYSICIAN: Dr. Clifton  REASON FOR CRITICAL CARE ADMISSION: Close hemodynamic monitoring, mechanical ventilation, CRRT  ADMITTING PHYSICIAN: Dr. 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 PMHx also includes coronary artery disease, alcohol overuse, obesity, ESRD on HD -- (IgA nephropathy + ANCA vasculitis), psoriatic arthritis, paroxysmal atrial fibrillation (on warfarin), DVT, recurrent c diff, and HTN.  He is now s/p  donor liver and kidney transplant on 23 with Dr. Clifton.  Intraop:   ml  500 ml Cell saver returned  4 uPRBC  2uPlt  1 uFFP  5L crystalloid  UOP 40cc prior to kidney, 90cc post kidney    PLAN:    Updates today (2023)  - Discontinue dilaudid, not using  - Start chest physiotherapy TID  - Per transplant nephrology, no need for dialysis today, possibly tomorrow  - Discontinue insulin gtt, transition to sliding scale insulin  - Continue line holiday  - Remove Lux  - Renal US    Neurological:  # Acute post-surgical pain   # Hx Alcohol use disorder, sober since 2016  - Monitor neurological status. Delirium preventions and precautions  - Sedation plan: None indicated  - Pain control: PRN oxycodone 5-10mg q4h, robaxin 500 QID    Pulmonary:   # Post operative ventilatory support, resolved  # Acute hypoxic respiratory failure requiring mechanical ventilation, resolved  # Bilateral small pleural effusions  - Successfully extubated  to NC, was saturating well on room air past couple days. 6/10 increased oxygen requirement to 6L oxymask. On 2L NC this morning, continue to closely monitor.  - Aggressive  pulmonary hygiene, IS, encourage OOB  - Supplemental O2 as needed to maintain SpO2 > 92%  - Start chest physiotherapy TID today    Cardiovascular:    #Hx pAfib on PTA warfarin  # Afib with RVR, improving  #Hx CAD  #Hx HTN  #Hypotension, orthostatic  #Shock, likely septic  - ECHO 6/5/23: afib, LVEF 55-60%  - CVP goal 8-12  - MAP goal 60-85, SBP goal 110-160. 6/10 peripheral levophed started for persistent hypotension.   - PTA metoprolol succinate ER 25 daily, 6/7 started metoprolol 12.5 BID 6/7, 6/8 increased to 25mg BID. Increased to 25mg q8h by transplant team. Holding oral metoprolol today, starting IV metoprolol 5 mg q4h with hold parameters.   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 Monitor for now, possible liver US tomorrow if LFTs are increasing per transplant  - Nutrition consulted and following, appreciate input. NJ placed 6/7, TF currently held due to ileus.   - Strict NPO, NGT to LIS. Will discuss possible TPN tomorrow if unable to advance diet.   - Bowel regimen, suppository today  - PPI     Renal/ Fluids/Electrolytes:    #Lactic acidosis, resolved  # Hx ESRD on HD MWF (IgA nephropathy + ANCA vasculitis) now s/p DDKT 23  # Delayed graft function  - Expect some delayed graft function with  donor kidney, making minimal urine, very slowly increasing  - transplant nephrology consulted and following, appreciate expertise  - CRRT discontinued 6/10, dialysis line removed due to positive blood cultures. No need for HD today, possibly .   - Remove Lux today    Endocrine:  # Stress hyperglycemia, improving  -has been on insulin gtt per protocol, minimal requirements recently, will transition to high correction ISS today. Goal to keep BG< 180 for optimal wound healing      ID:  # Stress leukocytosis, improving  # Post-transplant ppx per transplant  - WBC 7.2 from 10.0  -Perioperative antibiotics: Zosyn x 48 hrs (completed ), micafungin x 14 days  -Immunosuppression per transplant: thymoglobulin (held), tacrolimus 3mg BID (currently held), methylprednisolone taper  -Prophylactic regimen: Valganciclovir, Bactrim Ppx  # Enterococcus bacteremia  # Septic shock  -  blood cultures x2 growing E. Faecium  - Transplant ID consulted and following, appreciate recs  - Abx: linezolid, zosyn  - Daily blood cultures until clear  - Line holiday    Heme:     # Acute blood loss anemia   # Anemia of critical illness and chronic renal disease  # Hx hereditary hemochromatosis   # Thrombocytopenia 2/2 liver dysfunction  - Transfusion goals: INR <2, Platelets > 20, Fibrinogen > 200, Hgb >8.0  - Hgb 7.2, Plt 44 (43), INR 1.3. Will hold off on transfusion for now given relative stability, limited ability to remove volume.     Rheumatologic:  #Hx Gout  - hold PTA prednisone 10mg daily    Musculoskeletal:  # Weakness and deconditioning of critical illness   - Physical and occupational therapy consult, appreciate recommendations. Encourage increased time OOB.     Skin:  -Diligent skin care to prevent breakdown    General Cares/Prophylaxis:    DVT  Prophylaxis: SQH q12, SCDs  GI Prophylaxis: None indicated  Restraints: Restraints for medical healing needed: NO    Lines/ tubes/ drains:  -PIVs  -Stauffer catheter, remove today  -Intraabdominal MAE drain x1   -incisional wound vac (kidney incision)  - Axillary arterial line    Disposition:  - Surgical ICU      Discussed with Dr. Brower-Gely Peña MD  Surgery PGY2    - - - - - - - - - - - - - - - - - - - - - - - - - - - - - - - - - - - - - - - - - - - - - -   INTERVAL EVENTS/SUBJECTIVE:   No acute events, drowsy this morning. Denies pain.     PHYSICAL EXAMINATION:  Temp:  [97.6  F (36.4  C)-98.9  F (37.2  C)] 97.6  F (36.4  C)  Pulse:  [] 94  Resp:  [10-26] 16  BP: ()/(31-99) 90/76  MAP:  [58 mmHg-90 mmHg] 80 mmHg  Arterial Line BP: ()/(39-73) 98/68  FiO2 (%):  [2 %] 2 %  SpO2:  [66 %-100 %] 96 %     General: NAD  HEENT: NG secure in place. Jaundice appearance, scleral icterus.   Pulm/Resp: Breathing non-labored on 2L NC, equal chest rise, no audible wheezing  CV: irregularly irregular rhythm on monitor, normal rate  Abdomen: distended, chevron incision without drainage, RLQ prevena holding suctoin, MAE x 1 to bulb suction with serosanguinous output, soft reducible umbilical hernia.  : stauffer catheter in place without scant urine output  MSK/Extremities: peripheral edema R>L, peripheral pulses intact, SCDs in place  Skin: scattered purpura throughout face, chest, abdomen, and arms, jaundice, no erythema or streaking   Neuro: Drowsy but rousable, oriented    LABS: Reviewed.   Arterial Blood Gases   Recent Labs   Lab 06/10/23  1807 06/07/23  1637 06/07/23  1206 06/07/23  0757   PH 7.37 7.43 7.44 7.46*   PCO2 42 33* 32* 31*   PO2 107* 71* 129* 127*   HCO3 24 22 22 22     Complete Blood Count   Recent Labs   Lab 06/11/23  0339 06/10/23  1807 06/10/23  0919 06/10/23  0341 06/09/23 2019   WBC 7.2  --  10.0 8.9  8.9 6.3   HGB 7.2* 7.7* 7.0* 7.6*  7.6* 7.5*   PLT 44* 43* 71* 74*  74*  47*     Basic Metabolic Panel  Recent Labs   Lab 23  0623  0212 06/10/23  0607 06/10/23  03423  1440 23  1305   NA  --  137  --   --   --  135*  --  137  --  137   POTASSIUM  --  4.9  --   --   --  4.8  --  4.9  --  5.1   CHLORIDE  --  102  --   --   --  102  --  104  --  104   CO2  --  20*  --   --   --  22  --  20*  --  22   BUN  --  48.0*  --   --   --  31.4*  --  32.2*  --  32.2*   CR  --  2.58*  --   --   --  1.42*  --  1.52*  --  1.59*   * 120* 124* 132*   < > 128*   < > 144*  139*   < > 136*    < > = values in this interval not displayed.     Liver Function Tests  Recent Labs   Lab 06/11/23  0339 06/10/23  1807 06/10/23  0341 06/09/23  2019 06/09/23  1305 23  0413 23  1204 23  0415   *  --  186*  --   --  405*  --  816*   *  --  450*  --   --  566*  --  692*   ALKPHOS 151*  --  198*  --   --  177*  --  107   BILITOTAL 4.9*  --  2.1*  --   --  1.5*  --  1.7*   ALBUMIN 2.4*  --  2.8* 2.5* 2.4* 2.4*   < > 2.3*   INR 1.30* 1.29* 1.24*  --  1.38* 1.60*   < > 1.98*    < > = values in this interval not displayed.     Pancreatic Enzymes  Recent Labs   Lab 23  1605   LIPASE 59  --    AMYLASE 90 153*     Coagulation Profile  Recent Labs   Lab 06/11/23  0339 06/10/23  1807 06/10/23  0341 23  1305   INR 1.30* 1.29* 1.24* 1.38*   PTT 30 28 28 28       IMAGING:  Recent Results (from the past 24 hour(s))   Echocardiogram Limited   Result Value    LVEF  55-60%    Narrative    [id number]  [id number]  [id number]  [id number]^NANCY^[facility]ELMin[facility],[place]  Echocardiography Laboratory  [address] [place], [place] [id number]     Name: EMILIANA SCHREIBER  MRN: [id number]  : 1955  Study Date: 06/10/2023 10:36 AM  Age: 67 yrs  Gender: Male  Patient Location: [facility]  Reason For Study: Atrial Fibrillation, CHF  Ordering Physician: NANCY  YISEL  Performed By: Abdulaziz Chiu     BSA: 2.2 m2  Height: 67 in  Weight: 250 lb  HR: 124  BP: 91/59 mmHg  ______________________________________________________________________________  Procedure  Limited Echocardiogram with portions of two-dimensional, color and spectral  Doppler performed. scanned sitting.  ______________________________________________________________________________  Interpretation Summary  Technically difficult study. The patient's rhythm is atrial fibrillation.  Global and regional left ventricular function is normal with an EF of 55-60%.  Global right ventricular function is normal.  No pericardial effusion is present.  ______________________________________________________________________________  Left Ventricle  Global and regional left ventricular function is normal with an EF of 55-60%.  Mild to moderate concentric wall thickening consistent with left ventricular  hypertrophy is present.     Right Ventricle  Global right ventricular function is normal.     Pericardium  No pericardial effusion is present.     Compared to Previous Study  No significant changes noted.     ______________________________________________________________________________  MMode/2D Measurements & Calculations  IVSd: 1.5 cm  LVIDd: 3.3 cm  LVIDs: 2.4 cm  LVPWd: 1.3 cm     FS: 26.9 %  LV mass(C)d: 149.7 grams  LV mass(C)dI: 67.3 grams/m2  RWT: 0.78     ______________________________________________________________________________  Report approved by: MD Shaggy Castle 06/10/2023 11:49 AM         CT Chest Abdomen Pelvis w/o Contrast    Narrative    CT CHEST ABDOMEN PELVIS W/O CONTRAST 6/10/2023 12:10 PM    History: s/p liver kidney transplant    Comparison: CT abdomen and pelvis 1/23/2023.    Technique: Helical CT acquisition from the lung apices to the pubic  symphysis was obtained without intravenous contrast. Axial, coronal,  and sagittal reconstructions were obtained and  reviewed.    Findings:   Devices: Left-sided abdominal drain terminating in the subhepatic  region. Enteric tube and a feeding tube terminating in the stomach.  Left IJV central venous catheter terminating in the right atrium and  right IJV central venous catheter terminating in the SVC. Lux  catheter catheter in the bladder. Nephroureteral stent in the right  lower quadrant transplant kidney.    CHEST:   Lungs: Bilateral small pleural effusions, right more than the left  with adjacent atelectasis. Diffuse bilateral groundglass opacities. No  focal consolidation. No pneumothorax or suspicious pulmonary nodule.  Airways: Central tracheobronchial tree is clear.  Vessels: Main pulmonary artery and aorta are normal in caliber. Normal  three-vessel arch.  Heart: Heart size is normal without pericardial effusion.  Lymph nodes: No suspicious mediastinal lymphadenopathy.  Thyroid: Within normal limits.  Esophagus: Within normal limits    ABDOMEN PELVIS:  Liver: Postsurgical changes of liver transplantation. Small gas  containing perihepatic collection along the medial aspect of the  caudate lobe measuring 2.6 x 1.3 x 3.1 cm (series 7, image 109). Small  subhepatic fluid collection measuring 4.4 x 1.3 x 1.2 cm (series 7,  image 133).    Biliary system: Gallbladder is surgically absent. No intrahepatic or  extrahepatic biliary ductal dilatation.  Pancreas: No focal mass or dilation of the main pancreatic duct.  Stomach: Within normal limits.  Spleen: Within normal limits.  Adrenal glands: Within normal limits.  Kidneys: Atrophic native kidneys. No hydronephrosis, or stone. Right  lower quadrant transplant kidney is normal in size. Nephroureteral  stent in place with pigtail within the renal pelvis and distal pigtail  in the bladder. Foci of air in the renal collecting system is likely  due to the presence of the stent. No hydronephrosis. No perinephric  fluid collection.  Bladder: Within normal limits. Lux's catheter in  situ.  Reproductive organs: Within normal limits.  Colon: Within normal limits.  Small Bowel: Mildly dilated small bowel loops measuring up to 3.9 cm  in diameter without transition point.    Lymph nodes: No intra-abdominal or pelvic lymphadenopathy.  Vasculature: No aortic aneurysm.  Peritoneum: Trace fluid and fat stranding extending from the  perihepatic region, through right perinephric region into the right  paracolic gutter. No pneumoperitoneum.    Soft tissues: Umbilical hernia containing nonobstructed bowel loops.   Bones: No suspicious osseous lesion. Degenerative changes in the  spine. Flowing anterior osteophytes along the thoracic vertebrae  suggestive of diffuse idiopathic skeletal hyperostosis.      Impression    IMPRESSION:   1. Status post liver and kidney transplantation.   2. Small gas-containing fluid collection medial to the caudate lobe  may be postoperative seroma/hematoma but infection cannot be ruled  out.   3. Trace fluid and fat stranding extending from the perihepatic  region, through right perinephric region into the right paracolic  gutter, expected postoperative changes.  4. Mildly dilated small bowel loops without transition point  suggestive of postoperative ileus. Umbilical hernia containing  nonobstructed bowel loops.  5. Bilateral small pleural effusions, right more than the left, with  adjacent atelectasis. Diffuse mild bilateral groundglass opacities may  be due to atelectasis/edema.     ADRIANA BARROS MD         SYSTEM ID:  [id number]   XR Abdomen Port 1 View    Narrative    XR ABDOMEN PORT 1 VIEW  6/11/2023 2:30 AM      HISTORY: NGT placement    COMPARISON: Chest abdomen pelvis CT and abdominal radiograph 6/10/2023    FINDINGS: AP view of the abdomen. Enteric tube sidehole and tip  project over the stomach. Right upper quadrant drain in place.  Surgical changes of liver transplantation. Improved gaseous distention  of the stomach and slight improvement in multiple mildly  distended  loops of small bowel. No definite pneumatosis or free air. Moderate  stool in the right sided colon. Partially visualized lung opacities  and pleural effusions.      Impression    IMPRESSION:   1. Enteric tube sidehole and tip project over the stomach.  2. Improved gaseous distention of the stomach and mild gaseous  distention of small bowel loops, which was most consistent with ileus  on prior CT    I have personally reviewed the examination and initial interpretation  and I agree with the findings.    HEBERT MAYERS MD         SYSTEM ID:  [id number]

## 2023-06-11 NOTE — PROGRESS NOTES
Brief CV Note    #Atrial fibrillation with RVR  #DDKT  #ANCA vasculitis  #s/p DBD simultaneous liver kidney transplant on 6/6/2023 with complex reconstruction of accessory right hepatic artery. POD #4     Overall heart rate goal is well controlled, would recommend avoiding IV metoprolol at this moment allowing some permissive tachycardia given his critical illness.  Rate control goal of less than 110 long-term but okay with spikes in the 120s to 130s.  He should be anticoagulated from a atrial fibrillation standpoint when safe from a surgical perspective.  Cardiology will sign off at this moment please call back with any questions.

## 2023-06-11 NOTE — PLAN OF CARE
Neuros: Becoming increasingly confused. Pt keeps stating he has to leave for a doctor's appointment but is easily re-directable. Pt pulled his NGT at 0100. BL mitt restraints placed. PERRLA. Carolyn and follows commands. x1 IV dilaudid given for pain, pt reports improvement.  CV: Afebrile. A fib with rates 80s-100s. Levo re-started to maintain MAPs >65.  RR: Pt switched to 2 L NC after constantly attempting to remove oxymask. Clear LS.  GI/: No BM. Lux patent, oliguric. NGT replaced at 0100 and currently back to LIS. Pt c/o nausea when NGT was clamped while waiting for x-ray. x1 compazine given. Nausea improved.  Drains: Prevena wound vac & MAE drain    Plan: Continue with cares as ordered.    For vital signs and complete assessments, please see documentation flowsheets.

## 2023-06-11 NOTE — PLAN OF CARE
Problem: Liver Transplant  Goal: Optimal Coping with Organ Transplant  Intervention: Optimize Psychosocial Response  Recent Flowsheet Documentation  Taken 6/10/2023 1600 by Abdias Brennan, RN  Family/Support System Care:   caregiver stress acknowledged   self-care encouraged  Taken 6/10/2023 1200 by Abdias Brennan, RN  Family/Support System Care:   caregiver stress acknowledged   self-care encoura   Goal Outcome Evaluation:  D. Emesis, compazine given - cont to expectorate small amounts emisis-   A md Aware, ng placed with 2300cc out- feels much better.  I - . Up out of bed- antibiotics started-  improved

## 2023-06-11 NOTE — PLAN OF CARE
Problem: Restraint, Nonviolent  Goal: Absence of Harm or Injury  Outcome: Not Progressing  Intervention: Protect Dignity, Rights and Personal Wellbeing  Recent Flowsheet Documentation  Taken 6/11/2023 1600 by Abdias Brennan RN  Trust Relationship/Rapport:   care explained   choices provided   emotional support provided   empathic listening provided   questions answered   questions encouraged   reassurance provided   thoughts/feelings acknowledged  Taken 6/11/2023 1200 by Abdias Brennan, LORIN  Trust Relationship/Rapport:   care explained   choices provided   emotional support provided   empathic listening provided   questions answered   questions encouraged   reassurance provided   thoughts/feelings acknowledged  Taken 6/11/2023 0800 by Abdias Brennan, LORIN  Trust Relationship/Rapport:   care explained   choices provided   emotional support provided   empathic listening provided   questions answered   questions encouraged   reassurance provided   thoughts/feelings acknowledged  Intervention: Protect Skin and Joint Integrity  Recent Flowsheet Documentation  Taken 6/11/2023 1600 by Abdias Brennan, RN  Body Position:   turned   left   lower extremity elevated   upper extremity elevated  Taken 6/11/2023 1200 by Abdias Brennan RN  Body Position:   turned   left   lower extremity elevated   upper extremity elevated  Taken 6/11/2023 0800 by Abdias Brennan RN  Body Position:   turned   left   lower extremity elevated   upper extremity elevated   Goal Outcome Evaluation:  Pulling on lines and tubes - removed mitts and pulled ng out-  A ng replaced MD notified- cont to monitor.  P remove restraints when able

## 2023-06-11 NOTE — PROGRESS NOTES
[facility]   Transplant Nephrology Progress Note  Date of Admission:  6/5/2023  Today's Date: 06/11/2023    Recommendations:  -Hold RRT again for today, no line placement.   -If patient needs dialysis tmrw will want a temp line placed   -Would only do 24h of peripheral levophed ideally  -Daily blood cultures until clearance  -Narrow abx when sensitivities return  -Remove stauffer today if UOP doesn't increase in the next few hours. QShift bladder scans    Assessment & Plan   # DDKT: DGF    - Baseline Creatinine: ~ TBD   - Proteinuria: Not checked post transplant   - Date DSA Last Checked: Jun/2023      Latest DSA: Low level DSA (<1000 mfi) to CW9,    - BK Viremia: Not checked post transplant   - Kidney Tx Biopsy: No    -Pt was receiving CRRT from 6/6-6/10, stopped and LIJ tunneled line removed due to bacteremia.    -No acute indication for RRT today. Remains on pressors. 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 TBili increasing. Slow to decrease LFTs 2/2 hypotension    # Immunosuppression: Tacrolimus immediate release (goal 5-8), Mycophenolate mofetil (dose 500 mg every 12 hours) and Prednisone (dose taper) to 5mg daily    - Induction with Recent Transplant:  rATG total 4mg/kg, stopped due to sepsis   - Changes: Yes - agree with decreasing MMF in setting of bacteremia. Tac initially held 2/2 DGF per liver transplant team, started 6/9 PM.     # Infection Prophylaxis:   - PJP: Sulfa/TMP (Bactrim), holding with ileus  - CMV: Valganciclovir (Valcyte), holding with ileus, CMV IgG Ab positive  - Thrush: Micafungin (Mycamine)  - Fungal: Micafungin (Mycamine)    # History of C.diff:   -Consider PO vanc with abx    # E.Faecium bacteremia:   -Diagnosed on 6/9 BlCx. Pending  sensitivities. Obtain cultures daily until clearance   -Broad spectrum abx (linezolid, vanc, zosyn x48h) , transplant ID consulted, appreciate recommendations   -Removed tunneled lines and all other lines on 6/10   -If cultures don't clear consider ASHLEY. TTE w/ poor windows was negative on 6/10    # ANCA vasculitis:   -S/p Rituximab x2   -Obtain intermittent U/A     # Paroxysmal A-Fib: On coumadin and metoprolol ER 25 mg daily PTA. Consider switching to eliquis when kidney and liver are functioning.    -Agree with IV metoprolol 5mg q4h     # Hypotension: Hypotensive, on pressors;  Goal BP: MAP > 65   - Volume status: Total body volume up, but intravascularly hypovolemic  EDW ~ 96.4 kg (on HD)   - Changes: Not at this time. On levophed via peripheral line.     # Elevated Blood Glucose: Glucose generally running ~ 160s-200   - Management as per primary team.  On insulin gtt.    # Anemia in Chronic Renal Disease and acute blood loss: Hgb: Trend down      FEDERICO: No   - Iron studies: Unknown at this time, but checked with dialysis    -Transfuse for Hb<7    # Mineral Bone Disorder:   - Secondary renal hyperparathyroidism; PTH level: Minimally elevated ( pg/ml)        On treatment: None  - Vitamin D; level: Not checked recently, but was normal last check        On supplement: No  - Calcium; level: Normal when corrected for low alb       On supplement: No  - Phosphorus; level: High     On binder: No    # Ileus:   -NG placed 6/10 with 2.5L out    # Electrolytes:   - Potassium; level: High normal         On supplement: No  - Magnesium; level: High          On supplement: No  - Bicarbonate; level: Low normal         On supplement: No  - Sodium; level: Normal     # Gout:    - On prednisone 10 mg PO daily PTA. Wean prednisone down to 5mg daily and then will consider cortisol stimulation test    # Transplant History:  Etiology of Kidney Failure: IgA nephropathy and ANCA vasculitis  Tx: Liver Tx (ABIMAEL)  Transplant: 6/6/2023  (Liver), 6/6/2023 (Kidney)  Significant changes in immunosuppression: None  Significant transplant-related complications: None    Recommendations were communicated to the primary team verbally       Subhash Dutta MD   Pager: [phone]    Interval History   Remains anuric. Line removed yesterday, off dialysis. On peripheral levophed. NG placed due to ileus. Confused. On face mask    Review of Systems   4 point ROS was obtained and negative except as noted in the Interval History.    MEDICATIONS:    aspirin  300 mg Rectal Daily     [Held by provider] aspirin  325 mg Oral Daily     B and C vitamin Complex with folic acid  5 mL Per Feeding Tube Daily     bisacodyl  10 mg Rectal Once     heparin ANTICOAGULANT  5,000 Units Subcutaneous Q12H     insulin aspart  1-6 Units Subcutaneous Q4H     linezolid  600 mg Intravenous Q12H     [Held by provider] methocarbamol  500 mg Oral 4x Daily     methylPREDNISolone  8 mg Intravenous Once     metoprolol  5 mg Intravenous Q4H     [Held by provider] metoprolol tartrate  25 mg Oral Q8H     micafungin  100 mg Intravenous Q24H     mycophenolate mofetil  500 mg Intravenous Q12H     pantoprazole  40 mg Intravenous Daily with breakfast     piperacillin-tazobactam  2.25 g Intravenous Q6H     polyethylene glycol  17 g Oral Daily     predniSONE  10 mg Oral Daily     protein modular  1 packet Per Feeding Tube TID     sennosides  8.6 mg Oral BID     sodium chloride (PF)  3 mL Intravenous Q8H     [START ON 6/12/2023] sulfamethoxazole-trimethoprim  1 tablet Oral Once per day on Mon Wed Fri     [Held by provider] tacrolimus  2 mg Oral BID IS     tacrolimus  1 mg Sublingual BID IS     ursodiol  250 mg Oral BID     [START ON 6/12/2023] valGANciclovir  450 mg Oral Once per day on Mon Thu    Or     [START ON 6/12/2023] valGANciclovir  450 mg Oral or NG Tube Once per day on Mon Thu       dextrose       dextrose       NaCl 0.45 % 1,000 mL infusion       norepinephrine 0.05 mcg/kg/min (06/11/23 0816)  "      Physical Exam   Temp  Av.9  F (36.6  C)  Min: 97  F (36.1  C)  Max: 99.2  F (37.3  C)  Arterial Line BP  Min: 60/45  Max: 131/80  Arterial Line MAP (mmHg)  Av mmHg  Min: 52 mmHg  Max: 101 mmHg      Pulse  Av.5  Min: 68  Max: 159 Resp  Av.8  Min: 14  Max: 23  FiO2 (%)  Av.3 %  Min: 40 %  Max: 50 %  SpO2  Av.1 %  Min: 92 %  Max: 100 %    CVP (mmHg): 6 mmHgBP 90/76 (BP Location: Other (Comment))   Pulse 94   Temp 97.6  F (36.4  C) (Axillary)   Resp 16   Ht 1.702 m (5' 7\")   Wt 109.4 kg (241 lb 2.9 oz)   SpO2 96%   BMI 37.77 kg/m      Admit Weight: 102.3 kg (225 lb 8 oz)     GENERAL APPEARANCE: no distress  RESP: lungs clear to auscultation - no rales, rhonchi or wheezes  CV: regular rhythm, normal rate, no rub, no murmur  EDEMA: moderate LE edema bilaterally  ABDOMEN: soft, nondistended, nontender, bowel sounds normal  MS: extremities normal - no gross deformities noted, no evidence of inflammation in joints, no muscle tenderness  SKIN: no rash  PSYCH: fatigued  DIALYSIS ACCESS:  None    Data   All labs reviewed by me.  CMP  Recent Labs   Lab 23  0605 23  0339 23  0338 23  0212 06/10/23  0607 06/10/23  0341 23  2221 23  2019 23  1440 23  1305 23  0415 23  0413 23  0418 23  0415   NA  --  137  --   --   --  135*  --  137  --  137  --  135*   < > 138   POTASSIUM  --  4.9  --   --   --  4.8  --  4.9  --  5.1  --  5.0   < > 4.6   CHLORIDE  --  102  --   --   --  102  --  104  --  104  --  102   < > 106   CO2  --  20*  --   --   --  22  --  20*  --  22  --  22   < > 21*   ANIONGAP  --  15  --   --   --  11  --  13  --  11  --  11   < > 11   * 120* 124* 132*   < > 128*   < > 144*  139*   < > 136*   < > 131*   < > 113*   BUN  --  48.0*  --   --   --  31.4*  --  32.2*  --  32.2*  --  29.0*   < > 28.0*   CR  --  2.58*  --   --   --  1.42*  --  1.52*  --  1.59*  --  1.61*   < > 1.84*   GFRESTIMATED  --  27* "  --   --   --  55*  --  51*  --  48*  --  47*   < > 40*   NAZARIO  --  7.8*  --   --   --  8.0*  --  7.6*  --  7.7*  --  8.2*   < > 7.7*   MAG  --  2.6*  --   --   --  2.6*  --  2.6*  --  2.6*  --  2.7*   < > 2.8*   PHOS  --  5.7*  --   --   --  5.1*  --  5.1*  --  5.4*  --  5.6*   < > 5.5*   PROTTOTAL  --  4.5*  --   --   --  5.0*  --   --   --   --   --  4.6*  --  4.2*   ALBUMIN  --  2.4*  --   --   --  2.8*  --  2.5*  --  2.4*  --  2.4*   < > 2.3*   BILITOTAL  --  4.9*  --   --   --  2.1*  --   --   --   --   --  1.5*  --  1.7*   ALKPHOS  --  151*  --   --   --  198*  --   --   --   --   --  177*  --  107   AST  --  105*  --   --   --  186*  --   --   --   --   --  405*  --  816*   ALT  --  264*  --   --   --  450*  --   --   --   --   --  566*  --  692*    < > = values in this interval not displayed.     CBC  Recent Labs   Lab 06/11/23  0339 06/10/23  1807 06/10/23  0919 06/10/23  0341 06/09/23 2019   HGB 7.2* 7.7* 7.0* 7.6*  7.6* 7.5*   WBC 7.2  --  10.0 8.9  8.9 6.3   RBC 2.40*  --  2.34* 2.52*  2.52* 2.52*   HCT 22.6*  --  22.8* 24.2*  24.2* 24.4*   MCV 94  --  97 96  96 97   MCH 30.0  --  29.9 30.2  30.2 29.8   MCHC 31.9  --  30.7* 31.4*  31.4* 30.7*   RDW 16.6*  --  17.5* 17.4*  17.4* 17.5*   PLT 44* 43* 71* 74*  74* 47*     INR  Recent Labs   Lab 06/11/23  0339 06/10/23  1807 06/10/23  0341 06/09/23  1305   INR 1.30* 1.29* 1.24* 1.38*   PTT 30 28 28 28     ABG  Recent Labs   Lab 06/10/23  1807 06/10/23  0919 06/07/23  1637 06/07/23  1206 06/07/23  0757   PH 7.37  --  7.43 7.44 7.46*   PCO2 42  --  33* 32* 31*   PO2 107*  --  71* 129* 127*   HCO3 24  --  22 22 22   O2PER 4 6 21 40 40      Urine Studies  Recent Labs   Lab Test 06/10/23  1613 06/05/23  1620 04/07/23  1246 02/13/23  0743   COLOR Orange* Yellow Yellow Yellow   APPEARANCE Cloudy* Clear Clear Clear   URINEGLC 30* Negative Negative Negative   URINEBILI Negative Negative Negative Negative   URINEKETONE Negative Negative Negative Negative   SG  1.021 1.012 1.011 1.014   UBLD Large* Moderate* Moderate* Negative   URINEPH 5.5 5.5 5.5 5.0   PROTEIN 100* 10* 10* 10*   NITRITE Negative Negative Negative Negative   LEUKEST Large* Negative Negative Negative   RBCU >182* 2 5* 1   WBCU 128* 7* 2 2     No lab results found.  PTH  Recent Labs   Lab Test 05/19/23  0956   PTHI 67*     Iron Studies  Recent Labs   Lab Test 11/22/22  0848   IRON 68   *   IRONSAT 31   HOLA 429*       IMAGING:  All imaging studies reviewed by me.

## 2023-06-11 NOTE — PLAN OF CARE
Problem: Risk for Delirium  Goal: Optimal Coping  Outcome: Unable to Meet   Goal Outcome Evaluation:       D. Confused pulling on lines -  answers orientation questions appropriately, moves all extremities well- up in chair - stood at bedside with therapy-  rest times allowed. Family at bedside  A md aware of mental state- Md informed family may be caused from tacrolimus ( side effect) and will resolve.    P cont with confusion re orients easily ,follows commands , Incentive spirometry completed Q hour via Family at bedside.

## 2023-06-11 NOTE — PROGRESS NOTES
Calorie Count  Intake recorded for: 6/10  Total Kcals: 0 Total Protein: 0g  Kcals from Hospital Food: 0   Protein: 0g  Kcals from Outside Food (average):0 Protein: 0g  # Meals Ordered from Kitchen: 2 meals  # Meals Recorded: No intake recorded  # Supplements Recorded: No intake recorded

## 2023-06-11 NOTE — PROGRESS NOTES
Immunosuppression Management Note:    Damion Quinones is a 67 year old male who is seen today  for immunosuppression management     I, Chad Clifton MD, I have examined the patient with our GEORGE/Fellow as part of a shared visit.   I participated in the rounds,  discussed and agree with the note and findings and  reviewed today's vital signs, medications, labs and imaging as noted in this note.  I  reviewed the  immunosuppression medications.  I personally provided a substantive portion of the care of this patient. I personally performed the immunosuppressive management of this patient, reviewed the overall  immunosuppression including drug levels, allograft function and provided the recommendations to adjust the dose to provide optimal levels to prevent rejection of the allograft and prevent toxicity to the organs. This was complex care due to the fresh allograft.   Time spent: evaluating patient, examining patient, discussion of plan, counseling and documentation: >35 min   I spoke to the patient/family and explained below clinical details and answered all the questions      Transplant Surgery  Inpatient Daily Progress Note  06/11/2023    Assessment & Plan:   67 year old male with PMHx of decompensated alcohol + hemochromatosis (homozygous H63D) cirrhosis complicated by variceal bleeding s/p TIPS (10/1/2022) and hepatic encephalopathy + CKD (IgA nephropathy + ANCA vasculitis) s/p simultaneous liver kidney transplant on 6/6/2023. RTOR on 6/6/203 with concern for low flow in the renal graft - ex-lap, washout, closure with healthy appearing graft with intact arterial and venous flow.     s/p DBD simultaneous liver kidney transplant on 6/6/2023 with complex reconstruction of accessory right hepatic artery. POD #5  * RTOR on 6/6/203 with concern for low flow in the renal graft - ex-lap, washout, closure with healthy appearing graft with intact arterial and venous flow.     Transaminases and AP down, but Tbili up to  4.9. Recheck tomorrow  Liver US w/doppler 2023 - low waveforms and low resistive indices, but stable velocities and upstrokes.   Stent: No biliary stent was placed  Drains: 1 MAE drain in place (slight bile tinge)   - ASA 300mg daily (rectal due to ileus)  - Ursodiol 250mg BID    H/o CKD related IgA nephropathy and ANCA vasculitis s/p  donor renal transplant on 2023 with ureteral/J stent placement, delayed graft function  - US: patent vessels  -Dialysis on HOLD due to line holiday for bacteremia. Oliguria.    Immunosuppressed due to medications  - Thymoglobulin: s/p 1mg/kg on , , , . Hold today due to concern for bacteremia and possible sepsis.  - Steroid taper per protocol  - Restarted tacrolimus on 2023, goal 5-8. Sublingual dosing.  - MMF 500mg BID, dose reduced due to possible sepsis    : Remove stauffer per Nephrology and bladder scan Q shift.    Hematology:   Acute on chronic anemia: Hgb 7-9 in the setting of post-operative blood loss. No overt s/sx of blood loss.   Thrombocytopenia: Platelet count: 44    Cardiac:   Coronary artery disease: Continue ASA 81mg daily.    * Recommend statin resumption in the outpatient setting.     Paroxysmal atrial fibrillation:  Rate down to ~100. Cardiology consulted: recommended low dose beta blockade.   * On IV metoprolol   * Start heparin gtt when safe    Hypotension: Baseline hypotension with concern for sepsis with new finding of bacteremia. 6/10 Echo: EF 55-60%.   *Midodrine TID   *Pressor on/off    Respiratory:  Acute hypoxic respiratory failure in the setting of post-operative state: Extubated on  and now on 2L O2.    * Continue pulmonary toilet and incentive spirometry    GI/Nutrition:   Hepatitis B s Ag +: Noted to be positive pre-transplant on 23, but not previously positive (2023). HBV DNA negative on 23. No clear at risk behavior.    * Follow up repeat: Hepatitis B s Ag (negative) and hepatitis B DNA  (pending)    Mild protein calorie malnutrition: Changed to TPN due to ileus.    Post-operative ileus: Distended stomach and bowel loops noted on 6/10 AXR. NG placed and TF held.    Bowel regimen: Senna. Recommend stopping Miralax due to volume.    Endocrine:   Post-operative elevated blood glucoses: HgA1c 6.1% (6/6/2023)   * Continue Insulin gtt    Rheumatology:  Psoriatic arthritis: Prior to admission was on prednisone 10mg daily, on prednisone taper as above.    *Goal to discharge on prednisone 5mg; may consider cortisol stimulation test given long term steroid use    Neurologic  Acute post operative pain:    * SICU giving PRN IV dilaudid, minimal use    Deconditioning/Weakness: OT and PT optimization     Alcohol use disorder: Continue sober supports post transplant. Continue complete sobriety.    ID  Bacteremia: 6/9 blood cultures + enterococcus faecium. No vegetations noted on TTE. 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 Zosyn restarted due to concern for sepsis. Currently on a line holiday.    * Daily BCx until clear   * Linezolid 6/10-present   * Zosyn 6/10-present (empiric)    Prophylaxis: Mechanical DVT ppx , fall, GI (PPI BID), fungal (micafungin), CMV (valganciclovir, CMV IgG Ab +), PJP (bactrim)    Disposition: SICU    GEORGE/Fellow/Resident Provider: Kim Poe NP     Faculty: Dr. Clifton    __________________________________________________________________  Transplant History:  Transplants:6/6/2023 (Liver), 6/6/2023 (Kidney)   Postoperative day: 5 (Liver), 5 (Kidney)     Interval History:   - Overnight events: Very sleepy at time of exam. Up to a chair in the morning and oriented per RN.      Curent Meds:   aspirin  300 mg Rectal Daily    [Held by provider] aspirin  325 mg Oral Daily    B and C vitamin Complex with folic acid  5 mL Per Feeding Tube Daily    bisacodyl  10 mg Rectal Once    heparin ANTICOAGULANT  5,000 Units Subcutaneous Q12H    insulin aspart  1-6 Units Subcutaneous Q4H    linezolid   "600 mg Intravenous Q12H    [Held by provider] methocarbamol  500 mg Oral 4x Daily    metoprolol  5 mg Intravenous Q4H    [Held by provider] metoprolol tartrate  25 mg Oral Q8H    micafungin  100 mg Intravenous Q24H    mycophenolate mofetil  500 mg Intravenous Q12H    pantoprazole  40 mg Intravenous Daily with breakfast    piperacillin-tazobactam  2.25 g Intravenous Q6H    polyethylene glycol  17 g Oral Daily    predniSONE  10 mg Oral Daily    protein modular  1 packet Per Feeding Tube TID    sennosides  8.6 mg Oral BID    sodium chloride (PF)  3 mL Intravenous Q8H    [START ON 6/12/2023] sulfamethoxazole-trimethoprim  1 tablet Oral Once per day on Mon Wed Fri    [Held by provider] tacrolimus  2 mg Oral BID IS    tacrolimus  1 mg Sublingual BID IS    ursodiol  250 mg Oral BID    [START ON 6/12/2023] valGANciclovir  450 mg Oral Once per day on Mon Thu    Or    [START ON 6/12/2023] valGANciclovir  450 mg Oral or NG Tube Once per day on Mon Thu       Physical Exam:   Admit Weight: 102.3 kg (225 lb 8 oz)    Current Vitals:   BP 90/76 (BP Location: Other (Comment))   Pulse 92   Temp 97.6  F (36.4  C) (Axillary)   Resp 16   Ht 1.702 m (5' 7\")   Wt 109.4 kg (241 lb 2.9 oz)   SpO2 97%   BMI 37.77 kg/m      Vital sign ranges:    Temp:  [97.6  F (36.4  C)-98.9  F (37.2  C)] 97.6  F (36.4  C)  Pulse:  [] 92  Resp:  [10-26] 16  BP: ()/(33-87) 90/76  MAP:  [58 mmHg-90 mmHg] 78 mmHg  Arterial Line BP: ()/(39-76) 98/68  FiO2 (%):  [2 %] 2 %  SpO2:  [89 %-100 %] 97 %  Patient Vitals for the past 24 hrs:   BP Temp Temp src Pulse Resp SpO2 Weight   06/11/23 1200 -- -- -- 92 -- 97 % --   06/11/23 1145 -- -- -- 105 -- 99 % --   06/11/23 1130 -- -- -- 99 -- 99 % --   06/11/23 1115 -- -- -- 88 -- 99 % --   06/11/23 1100 -- -- -- 99 -- 99 % --   06/11/23 1045 -- -- -- 89 -- (!) 89 % --   06/11/23 1030 -- -- -- 87 -- (!) 89 % --   06/11/23 1015 -- -- -- 108 -- 91 % --   06/11/23 1000 -- -- -- 92 -- 100 % -- "   06/11/23 0945 -- -- -- 86 -- -- --   06/11/23 0930 -- -- -- 86 -- -- --   06/11/23 0915 -- -- -- 90 -- -- --   06/11/23 0900 -- -- -- 91 -- -- --   06/11/23 0845 -- -- -- 88 -- -- --   06/11/23 0830 -- -- -- 94 -- 96 % --   06/11/23 0815 -- -- -- 85 -- 97 % --   06/11/23 0800 -- 97.6  F (36.4  C) Axillary 95 16 98 % --   06/11/23 0745 -- -- -- 81 -- 99 % --   06/11/23 0730 -- -- -- 88 -- 99 % --   06/11/23 0715 -- -- -- 96 -- 98 % --   06/11/23 0700 -- -- -- 81 12 91 % --   06/11/23 0645 -- -- -- 97 -- 97 % --   06/11/23 0630 -- -- -- 94 -- 95 % --   06/11/23 0615 -- -- -- 94 -- 96 % --   06/11/23 0600 -- -- -- 90 11 99 % 109.4 kg (241 lb 2.9 oz)   06/11/23 0545 -- -- -- 97 -- 98 % --   06/11/23 0530 -- -- -- 90 -- 96 % --   06/11/23 0515 -- -- -- 108 -- 96 % --   06/11/23 0500 -- -- -- 96 10 96 % --   06/11/23 0445 -- -- -- 95 -- 92 % --   06/11/23 0430 -- -- -- 98 -- 96 % --   06/11/23 0415 -- -- -- 96 -- 96 % --   06/11/23 0400 -- 97.9  F (36.6  C) Axillary 89 11 98 % --   06/11/23 0345 -- -- -- 100 -- 97 % --   06/11/23 0330 -- -- -- 108 -- 97 % --   06/11/23 0315 -- -- -- 105 -- 97 % --   06/11/23 0300 -- -- -- 107 12 97 % --   06/11/23 0245 -- -- -- 99 -- 97 % --   06/11/23 0230 -- -- -- 92 -- 92 % --   06/11/23 0215 -- -- -- 95 -- 93 % --   06/11/23 0200 -- -- -- 103 12 94 % --   06/11/23 0145 -- -- -- 106 -- 93 % --   06/11/23 0130 -- -- -- 99 -- 90 % --   06/11/23 0115 -- -- -- 96 -- 93 % --   06/11/23 0100 -- -- -- 102 12 94 % --   06/11/23 0045 -- -- -- 104 -- 97 % --   06/11/23 0030 -- -- -- 91 -- 95 % --   06/11/23 0015 -- -- -- 95 -- 96 % --   06/11/23 0000 -- 97.6  F (36.4  C) Axillary 87 11 97 % --   06/10/23 2345 -- -- -- 101 -- 96 % --   06/10/23 2300 -- -- -- 114 12 96 % --   06/10/23 2245 -- -- -- 115 -- 98 % --   06/10/23 2230 -- -- -- 108 -- 98 % --   06/10/23 2215 -- -- -- 101 -- 98 % --   06/10/23 2200 -- -- -- 112 11 96 % --   06/10/23 2145 -- -- -- 97 -- 97 % --   06/10/23 2130 -- -- --  100 -- 94 % --   06/10/23 2100 -- -- -- 98 19 99 % --   06/10/23 2000 -- 98.6  F (37  C) Axillary 89 15 99 % --   06/10/23 1900 -- -- -- 112 -- -- --   06/10/23 1800 -- -- -- (!) 121 -- 93 % --   06/10/23 1739 -- -- -- 109 -- -- --   06/10/23 1725 -- -- -- 111 -- -- --   06/10/23 1724 -- -- -- 114 -- -- --   06/10/23 1723 -- -- -- (!) 122 -- -- --   06/10/23 1722 -- -- -- 119 -- -- --   06/10/23 1721 -- -- -- 106 -- -- --   06/10/23 1720 -- -- -- 120 -- -- --   06/10/23 1719 -- -- -- 113 -- -- --   06/10/23 1718 -- -- -- 100 -- -- --   06/10/23 1717 -- -- -- 117 -- -- --   06/10/23 1716 -- -- -- 118 -- -- --   06/10/23 1715 -- -- -- 105 -- -- --   06/10/23 1714 -- -- -- 101 -- -- --   06/10/23 1713 -- -- -- 102 -- -- --   06/10/23 1712 -- -- -- 113 -- -- --   06/10/23 1711 -- -- -- 102 -- -- --   06/10/23 1710 -- -- -- 119 -- -- --   06/10/23 1709 -- -- -- 109 -- -- --   06/10/23 1708 -- -- -- 104 -- -- --   06/10/23 1707 -- -- -- 98 -- -- --   06/10/23 1706 -- -- -- (!) 137 -- -- --   06/10/23 1705 -- -- -- 98 -- -- --   06/10/23 1704 -- -- -- 103 -- -- --   06/10/23 1703 -- -- -- 107 -- -- --   06/10/23 1702 -- -- -- 98 -- -- --   06/10/23 1701 -- -- -- 114 -- -- --   06/10/23 1700 90/76 98.5  F (36.9  C) Axillary 120 22 -- --   06/10/23 1645 -- -- -- 114 -- 99 % --   06/10/23 1630 95/62 -- -- (!) 126 -- 98 % --   06/10/23 1615 109/60 -- -- 114 -- 99 % --   06/10/23 1600 (!) 81/33 -- -- (!) 133 22 98 % --   06/10/23 1545 -- -- -- 119 -- 98 % --   06/10/23 1530 (!) 81/59 -- -- 115 18 99 % --   06/10/23 1515 -- -- -- (!) 128 -- 99 % --   06/10/23 1500 (!) 61/39 -- -- 114 -- 99 % --   06/10/23 1454 99/87 98.9  F (37.2  C) Axillary -- 26 99 % --   06/10/23 1445 -- -- -- 112 -- 99 % --   06/10/23 1444 93/60 98.8  F (37.1  C) -- 110 -- -- --   06/10/23 1430 95/60 -- -- 115 -- 99 % --   06/10/23 1415 -- -- -- 108 -- 98 % --   06/10/23 1400 100/59 -- -- 111 -- 99 % --   06/10/23 1345 -- -- -- 115 -- 99 % --   06/10/23  "1330 95/65 -- -- 103 -- 100 % --   06/10/23 1315 -- -- -- (!) 126 -- 98 % --     VS:  BP 90/76 (BP Location: Other (Comment))   Pulse 92   Temp 97.6  F (36.4  C) (Axillary)   Resp 16   Ht 1.702 m (5' 7\")   Wt 109.4 kg (241 lb 2.9 oz)   SpO2 97%   BMI 37.77 kg/m      Wt:   Wt Readings from Last 2 Encounters:   06/11/23 109.4 kg (241 lb 2.9 oz)   05/19/23 103.4 kg (228 lb)      Constitutional: Sleepy at time of exam  CV: AF RVR rate ~100  Respiratory: breathing comfortably on 2L O2  Abd: Obese. Incisions intact. + MAE drain w/serosang drainage w/ slight bile tinge. Leaking around MAE.  Skin: Faint jaundice, scattered ecchymosis and bruising, numerous skin tears  MSK: 2+ generalized edema  Neuro: Alert and oriented earlier, sleeping at time of writer's exam    Examined in AM by Dr. Singh    Data:   CMP  Recent Labs   Lab 06/11/23  1231 06/11/23  0605 06/11/23  0339 06/10/23  0607 06/10/23  0341 06/07/23  0337 06/07/23  0330 06/06/23  0547 06/05/23  1605   NA  --   --  137  --  135*   < > 138   < > 139   POTASSIUM  --   --  4.9  --  4.8   < > 4.4   < > 4.1   CHLORIDE  --   --  102  --  102   < > 105   < > 105   CO2  --   --  20*  --  22   < > 17*   < > 21*   * 110* 120*   < > 128*   < > 167*   < > 101*   BUN  --   --  48.0*  --  31.4*   < > 32.0*   < > 36.9*   CR  --   --  2.58*  --  1.42*   < > 2.76*   < > 3.85*   GFRESTIMATED  --   --  27*  --  55*   < > 25*   < > 17*   NAZARIO  --   --  7.8*  --  8.0*   < > 7.6*   < > 8.7*   ICAW  --   --  4.2*  --  4.5   < >  --    < >  --    MAG  --   --  2.6*  --  2.6*   < > 2.1   < > 2.2   PHOS  --   --  5.7*  --  5.1*   < > 4.7*   < > 3.4   AMYLASE  --   --   --   --   --   --  90  --  153*   LIPASE  --   --   --   --   --   --  59  --   --    ALBUMIN  --   --  2.4*  --  2.8*   < > 2.0*   < > 2.9*   BILITOTAL  --   --  4.9*  --  2.1*   < > 1.6*   < > 0.6   ALKPHOS  --   --  151*  --  198*   < > 81   < > 207*   AST  --   --  105*  --  186*   < > 1,834*   < >  --    ALT  " --   --  264*  --  450*   < > 654*   < > 84*    < > = values in this interval not displayed.     CBC  Recent Labs   Lab 06/11/23  0339 06/10/23  1807 06/10/23  0919 06/06/23  2315 06/06/23  2108   HGB 7.2* 7.7* 7.0*   < > 9.1*   WBC 7.2  --  10.0   < > 12.3*   PLT 44* 43* 71*   < > 114*   A1C  --   --   --   --  5.1    < > = values in this interval not displayed.     COAGS  Recent Labs   Lab 06/11/23  0339 06/10/23  1807   INR 1.30* 1.29*   PTT 30 28

## 2023-06-12 ENCOUNTER — APPOINTMENT (OUTPATIENT)
Dept: GENERAL RADIOLOGY | Facility: CLINIC | Age: 68
End: 2023-06-12
Attending: PHYSICIAN ASSISTANT
Payer: COMMERCIAL

## 2023-06-12 ENCOUNTER — APPOINTMENT (OUTPATIENT)
Dept: CT IMAGING | Facility: CLINIC | Age: 68
End: 2023-06-12
Attending: STUDENT IN AN ORGANIZED HEALTH CARE EDUCATION/TRAINING PROGRAM
Payer: COMMERCIAL

## 2023-06-12 ENCOUNTER — APPOINTMENT (OUTPATIENT)
Dept: NEUROLOGY | Facility: CLINIC | Age: 68
End: 2023-06-12
Attending: INTERNAL MEDICINE
Payer: COMMERCIAL

## 2023-06-12 ENCOUNTER — MYC MEDICAL ADVICE (OUTPATIENT)
Dept: TRANSPLANT | Facility: CLINIC | Age: 68
End: 2023-06-12

## 2023-06-12 ENCOUNTER — APPOINTMENT (OUTPATIENT)
Dept: MRI IMAGING | Facility: CLINIC | Age: 68
End: 2023-06-12
Attending: STUDENT IN AN ORGANIZED HEALTH CARE EDUCATION/TRAINING PROGRAM
Payer: COMMERCIAL

## 2023-06-12 DIAGNOSIS — I48.20 CHRONIC ATRIAL FIBRILLATION (H): Primary | ICD-10-CM

## 2023-06-12 DIAGNOSIS — Z94.4 LIVER TRANSPLANT RECIPIENT (H): ICD-10-CM

## 2023-06-12 LAB
ALBUMIN SERPL BCG-MCNC: 2.3 G/DL (ref 3.5–5.2)
ALLEN'S TEST: ABNORMAL
ALP SERPL-CCNC: 119 U/L (ref 40–129)
ALT SERPL W P-5'-P-CCNC: 186 U/L (ref 10–50)
AMMONIA PLAS-SCNC: 18 UMOL/L (ref 16–60)
ANION GAP SERPL CALCULATED.3IONS-SCNC: 17 MMOL/L (ref 7–15)
ANION GAP SERPL CALCULATED.3IONS-SCNC: 19 MMOL/L (ref 7–15)
APTT PPP: 30 SECONDS (ref 22–38)
AST SERPL W P-5'-P-CCNC: 75 U/L (ref 10–50)
ATRIAL RATE - MUSE: NORMAL BPM
BACTERIA BLD CULT: ABNORMAL
BACTERIA UR CULT: NO GROWTH
BASE EXCESS BLDA CALC-SCNC: -5.3 MMOL/L (ref -9–1.8)
BASOPHILS # BLD MANUAL: 0 10E3/UL (ref 0–0.2)
BASOPHILS NFR BLD MANUAL: 0 %
BILIRUB DIRECT SERPL-MCNC: 2.8 MG/DL (ref 0–0.3)
BILIRUB SERPL-MCNC: 3 MG/DL
BLD PROD TYP BPU: NORMAL
BLOOD COMPONENT TYPE: NORMAL
BUN SERPL-MCNC: 66 MG/DL (ref 8–23)
BUN SERPL-MCNC: 71.9 MG/DL (ref 8–23)
CA-I BLD-MCNC: 4.1 MG/DL (ref 4.4–5.2)
CALCIUM SERPL-MCNC: 7.7 MG/DL (ref 8.8–10.2)
CALCIUM SERPL-MCNC: 7.8 MG/DL (ref 8.8–10.2)
CHLORIDE SERPL-SCNC: 100 MMOL/L (ref 98–107)
CHLORIDE SERPL-SCNC: 102 MMOL/L (ref 98–107)
CODING SYSTEM: NORMAL
CREAT SERPL-MCNC: 3.83 MG/DL (ref 0.67–1.17)
CREAT SERPL-MCNC: 4.16 MG/DL (ref 0.67–1.17)
CROSSMATCH: NORMAL
DEPRECATED HCO3 PLAS-SCNC: 17 MMOL/L (ref 22–29)
DEPRECATED HCO3 PLAS-SCNC: 19 MMOL/L (ref 22–29)
DIASTOLIC BLOOD PRESSURE - MUSE: NORMAL MMHG
ELLIPTOCYTES BLD QL SMEAR: SLIGHT
EOSINOPHIL # BLD MANUAL: 0 10E3/UL (ref 0–0.7)
EOSINOPHIL NFR BLD MANUAL: 0 %
ERYTHROCYTE [DISTWIDTH] IN BLOOD BY AUTOMATED COUNT: 16.6 % (ref 10–15)
ERYTHROCYTE [DISTWIDTH] IN BLOOD BY AUTOMATED COUNT: 17.2 % (ref 10–15)
FIBRINOGEN PPP-MCNC: 662 MG/DL (ref 170–490)
GFR SERPL CREATININE-BSD FRML MDRD: 15 ML/MIN/1.73M2
GFR SERPL CREATININE-BSD FRML MDRD: 17 ML/MIN/1.73M2
GLUCOSE BLDC GLUCOMTR-MCNC: 104 MG/DL (ref 70–99)
GLUCOSE BLDC GLUCOMTR-MCNC: 108 MG/DL (ref 70–99)
GLUCOSE BLDC GLUCOMTR-MCNC: 117 MG/DL (ref 70–99)
GLUCOSE BLDC GLUCOMTR-MCNC: 124 MG/DL (ref 70–99)
GLUCOSE BLDC GLUCOMTR-MCNC: 94 MG/DL (ref 70–99)
GLUCOSE SERPL-MCNC: 110 MG/DL (ref 70–99)
GLUCOSE SERPL-MCNC: 113 MG/DL (ref 70–99)
HCO3 BLD-SCNC: 20 MMOL/L (ref 21–28)
HCT VFR BLD AUTO: 20.6 % (ref 40–53)
HCT VFR BLD AUTO: 25.1 % (ref 40–53)
HGB BLD-MCNC: 6.6 G/DL (ref 13.3–17.7)
HGB BLD-MCNC: 8.1 G/DL (ref 13.3–17.7)
INR PPP: 1.27 (ref 0.85–1.15)
INTERPRETATION ECG - MUSE: NORMAL
ISSUE DATE AND TIME: NORMAL
LACTATE SERPL-SCNC: 0.6 MMOL/L (ref 0.7–2)
LYMPHOCYTES # BLD MANUAL: 0 10E3/UL (ref 0.8–5.3)
LYMPHOCYTES NFR BLD MANUAL: 0 %
MAGNESIUM SERPL-MCNC: 2.7 MG/DL (ref 1.7–2.3)
MCH RBC QN AUTO: 30.2 PG (ref 26.5–33)
MCH RBC QN AUTO: 30.6 PG (ref 26.5–33)
MCHC RBC AUTO-ENTMCNC: 32 G/DL (ref 31.5–36.5)
MCHC RBC AUTO-ENTMCNC: 32.3 G/DL (ref 31.5–36.5)
MCV RBC AUTO: 94 FL (ref 78–100)
MCV RBC AUTO: 95 FL (ref 78–100)
MONOCYTES # BLD MANUAL: 0.1 10E3/UL (ref 0–1.3)
MONOCYTES NFR BLD MANUAL: 1 %
NEUTROPHILS # BLD MANUAL: 7.6 10E3/UL (ref 1.6–8.3)
NEUTROPHILS NFR BLD MANUAL: 99 %
NRBC # BLD AUTO: 0.1 10E3/UL
NRBC BLD MANUAL-RTO: 1 %
O2/TOTAL GAS SETTING VFR VENT: 28 %
OXYHGB MFR BLD: 97 % (ref 92–100)
P AXIS - MUSE: NORMAL DEGREES
PCO2 BLD: 38 MM HG (ref 35–45)
PH BLD: 7.34 [PH] (ref 7.35–7.45)
PHOSPHATE SERPL-MCNC: 7.7 MG/DL (ref 2.5–4.5)
PLAT MORPH BLD: ABNORMAL
PLATELET # BLD AUTO: 45 10E3/UL (ref 150–450)
PLATELET # BLD AUTO: 50 10E3/UL (ref 150–450)
PO2 BLD: 176 MM HG (ref 80–105)
POLYCHROMASIA BLD QL SMEAR: SLIGHT
POTASSIUM SERPL-SCNC: 5.1 MMOL/L (ref 3.4–5.3)
POTASSIUM SERPL-SCNC: 5.1 MMOL/L (ref 3.4–5.3)
PR INTERVAL - MUSE: NORMAL MS
PROT SERPL-MCNC: 4.5 G/DL (ref 6.4–8.3)
QRS DURATION - MUSE: 74 MS
QT - MUSE: 294 MS
QTC - MUSE: 429 MS
R AXIS - MUSE: 47 DEGREES
RBC # BLD AUTO: 2.16 10E6/UL (ref 4.4–5.9)
RBC # BLD AUTO: 2.68 10E6/UL (ref 4.4–5.9)
RBC MORPH BLD: ABNORMAL
SODIUM SERPL-SCNC: 136 MMOL/L (ref 136–145)
SODIUM SERPL-SCNC: 138 MMOL/L (ref 136–145)
SYSTOLIC BLOOD PRESSURE - MUSE: NORMAL MMHG
T AXIS - MUSE: -61 DEGREES
TACROLIMUS BLD-MCNC: 8.3 UG/L (ref 5–15)
TME LAST DOSE: NORMAL H
TME LAST DOSE: NORMAL H
UNIT ABO/RH: NORMAL
UNIT NUMBER: NORMAL
UNIT STATUS: NORMAL
UNIT TYPE ISBT: 5100
VANCOMYCIN SERPL-MCNC: 14.1 UG/ML
VENTRICULAR RATE- MUSE: 128 BPM
WBC # BLD AUTO: 7.7 10E3/UL (ref 4–11)
WBC # BLD AUTO: 8.1 10E3/UL (ref 4–11)

## 2023-06-12 PROCEDURE — 250N000013 HC RX MED GY IP 250 OP 250 PS 637: Performed by: NURSE PRACTITIONER

## 2023-06-12 PROCEDURE — 82140 ASSAY OF AMMONIA: CPT

## 2023-06-12 PROCEDURE — 87040 BLOOD CULTURE FOR BACTERIA: CPT | Performed by: TRANSPLANT SURGERY

## 2023-06-12 PROCEDURE — 250N000011 HC RX IP 250 OP 636: Performed by: SURGERY

## 2023-06-12 PROCEDURE — 94667 MNPJ CHEST WALL 1ST: CPT

## 2023-06-12 PROCEDURE — 83605 ASSAY OF LACTIC ACID: CPT

## 2023-06-12 PROCEDURE — 250N000013 HC RX MED GY IP 250 OP 250 PS 637: Performed by: STUDENT IN AN ORGANIZED HEALTH CARE EDUCATION/TRAINING PROGRAM

## 2023-06-12 PROCEDURE — 250N000011 HC RX IP 250 OP 636: Performed by: PHYSICIAN ASSISTANT

## 2023-06-12 PROCEDURE — 74340 X-RAY GUIDE FOR GI TUBE: CPT | Mod: 26 | Performed by: RADIOLOGY

## 2023-06-12 PROCEDURE — 84100 ASSAY OF PHOSPHORUS: CPT | Performed by: SURGERY

## 2023-06-12 PROCEDURE — 70547 MR ANGIOGRAPHY NECK W/O DYE: CPT | Mod: 26 | Performed by: RADIOLOGY

## 2023-06-12 PROCEDURE — P9016 RBC LEUKOCYTES REDUCED: HCPCS

## 2023-06-12 PROCEDURE — 250N000013 HC RX MED GY IP 250 OP 250 PS 637: Performed by: TRANSPLANT SURGERY

## 2023-06-12 PROCEDURE — 99223 1ST HOSP IP/OBS HIGH 75: CPT | Mod: 25 | Performed by: PSYCHIATRY & NEUROLOGY

## 2023-06-12 PROCEDURE — 85027 COMPLETE CBC AUTOMATED: CPT

## 2023-06-12 PROCEDURE — 70544 MR ANGIOGRAPHY HEAD W/O DYE: CPT

## 2023-06-12 PROCEDURE — 80197 ASSAY OF TACROLIMUS: CPT | Performed by: NURSE PRACTITIONER

## 2023-06-12 PROCEDURE — 99233 SBSQ HOSP IP/OBS HIGH 50: CPT | Mod: 24 | Performed by: INTERNAL MEDICINE

## 2023-06-12 PROCEDURE — 250N000012 HC RX MED GY IP 250 OP 636 PS 637: Performed by: STUDENT IN AN ORGANIZED HEALTH CARE EDUCATION/TRAINING PROGRAM

## 2023-06-12 PROCEDURE — 0DH98UZ INSERTION OF FEEDING DEVICE INTO DUODENUM, VIA NATURAL OR ARTIFICIAL OPENING ENDOSCOPIC: ICD-10-PCS | Performed by: RADIOLOGY

## 2023-06-12 PROCEDURE — 80053 COMPREHEN METABOLIC PANEL: CPT | Performed by: SURGERY

## 2023-06-12 PROCEDURE — 83735 ASSAY OF MAGNESIUM: CPT | Performed by: SURGERY

## 2023-06-12 PROCEDURE — 82330 ASSAY OF CALCIUM: CPT | Performed by: SURGERY

## 2023-06-12 PROCEDURE — 85027 COMPLETE CBC AUTOMATED: CPT | Performed by: SURGERY

## 2023-06-12 PROCEDURE — 250N000009 HC RX 250

## 2023-06-12 PROCEDURE — 258N000003 HC RX IP 258 OP 636: Performed by: SURGERY

## 2023-06-12 PROCEDURE — 258N000003 HC RX IP 258 OP 636: Performed by: TRANSPLANT SURGERY

## 2023-06-12 PROCEDURE — 70551 MRI BRAIN STEM W/O DYE: CPT | Mod: 26 | Performed by: RADIOLOGY

## 2023-06-12 PROCEDURE — C9113 INJ PANTOPRAZOLE SODIUM, VIA: HCPCS | Performed by: TRANSPLANT SURGERY

## 2023-06-12 PROCEDURE — 250N000009 HC RX 250: Performed by: TRANSPLANT SURGERY

## 2023-06-12 PROCEDURE — 999N000157 HC STATISTIC RCP TIME EA 10 MIN

## 2023-06-12 PROCEDURE — 999N000176 HC STATISTIC STROKE CODE W/O ACCESS

## 2023-06-12 PROCEDURE — 85730 THROMBOPLASTIN TIME PARTIAL: CPT | Performed by: TRANSPLANT SURGERY

## 2023-06-12 PROCEDURE — 80202 ASSAY OF VANCOMYCIN: CPT | Performed by: TRANSPLANT SURGERY

## 2023-06-12 PROCEDURE — 99233 SBSQ HOSP IP/OBS HIGH 50: CPT | Mod: GC | Performed by: STUDENT IN AN ORGANIZED HEALTH CARE EDUCATION/TRAINING PROGRAM

## 2023-06-12 PROCEDURE — 70450 CT HEAD/BRAIN W/O DYE: CPT | Mod: 26 | Performed by: RADIOLOGY

## 2023-06-12 PROCEDURE — 250N000013 HC RX MED GY IP 250 OP 250 PS 637: Performed by: SURGERY

## 2023-06-12 PROCEDURE — 44500 INTRO GASTROINTESTINAL TUBE: CPT

## 2023-06-12 PROCEDURE — 250N000011 HC RX IP 250 OP 636: Performed by: STUDENT IN AN ORGANIZED HEALTH CARE EDUCATION/TRAINING PROGRAM

## 2023-06-12 PROCEDURE — 99207 EEG VIDEO 12-26 HR UNMONITORED: CPT | Performed by: PSYCHIATRY & NEUROLOGY

## 2023-06-12 PROCEDURE — 82805 BLOOD GASES W/O2 SATURATION: CPT | Performed by: TRANSPLANT SURGERY

## 2023-06-12 PROCEDURE — 36415 COLL VENOUS BLD VENIPUNCTURE: CPT | Performed by: TRANSPLANT SURGERY

## 2023-06-12 PROCEDURE — 82310 ASSAY OF CALCIUM: CPT

## 2023-06-12 PROCEDURE — 250N000011 HC RX IP 250 OP 636: Performed by: TRANSPLANT SURGERY

## 2023-06-12 PROCEDURE — 85610 PROTHROMBIN TIME: CPT | Performed by: TRANSPLANT SURGERY

## 2023-06-12 PROCEDURE — 95720 EEG PHY/QHP EA INCR W/VEEG: CPT | Performed by: PSYCHIATRY & NEUROLOGY

## 2023-06-12 PROCEDURE — 85007 BL SMEAR W/DIFF WBC COUNT: CPT | Performed by: SURGERY

## 2023-06-12 PROCEDURE — 70450 CT HEAD/BRAIN W/O DYE: CPT

## 2023-06-12 PROCEDURE — 95714 VEEG EA 12-26 HR UNMNTR: CPT

## 2023-06-12 PROCEDURE — 82248 BILIRUBIN DIRECT: CPT | Performed by: SURGERY

## 2023-06-12 PROCEDURE — 250N000009 HC RX 250: Performed by: RADIOLOGY

## 2023-06-12 PROCEDURE — 85384 FIBRINOGEN ACTIVITY: CPT | Performed by: TRANSPLANT SURGERY

## 2023-06-12 PROCEDURE — 70547 MR ANGIOGRAPHY NECK W/O DYE: CPT

## 2023-06-12 PROCEDURE — 70551 MRI BRAIN STEM W/O DYE: CPT

## 2023-06-12 PROCEDURE — 200N000002 HC R&B ICU UMMC

## 2023-06-12 RX ORDER — METHYLPREDNISOLONE SODIUM SUCCINATE 40 MG/ML
8 INJECTION, POWDER, LYOPHILIZED, FOR SOLUTION INTRAMUSCULAR; INTRAVENOUS ONCE
Status: DISCONTINUED | OUTPATIENT
Start: 2023-06-12 | End: 2023-06-12

## 2023-06-12 RX ORDER — ONDANSETRON 4 MG/1
4 TABLET, ORALLY DISINTEGRATING ORAL EVERY 6 HOURS PRN
Status: DISCONTINUED | OUTPATIENT
Start: 2023-06-12 | End: 2023-06-19

## 2023-06-12 RX ORDER — BUMETANIDE 0.25 MG/ML
3 INJECTION INTRAMUSCULAR; INTRAVENOUS ONCE
Status: COMPLETED | OUTPATIENT
Start: 2023-06-12 | End: 2023-06-12

## 2023-06-12 RX ORDER — IOPAMIDOL 755 MG/ML
75 INJECTION, SOLUTION INTRAVASCULAR ONCE
Status: DISCONTINUED | OUTPATIENT
Start: 2023-06-12 | End: 2023-06-12

## 2023-06-12 RX ORDER — LIDOCAINE HYDROCHLORIDE 20 MG/ML
5 SOLUTION OROPHARYNGEAL ONCE
Status: COMPLETED | OUTPATIENT
Start: 2023-06-12 | End: 2023-06-12

## 2023-06-12 RX ORDER — METHYLPREDNISOLONE SODIUM SUCCINATE 40 MG/ML
8 INJECTION, POWDER, LYOPHILIZED, FOR SOLUTION INTRAMUSCULAR; INTRAVENOUS ONCE
Status: COMPLETED | OUTPATIENT
Start: 2023-06-12 | End: 2023-06-12

## 2023-06-12 RX ORDER — METHYLPREDNISOLONE SODIUM SUCCINATE 40 MG/ML
8 INJECTION, POWDER, LYOPHILIZED, FOR SOLUTION INTRAMUSCULAR; INTRAVENOUS EVERY 24 HOURS
Status: DISCONTINUED | OUTPATIENT
Start: 2023-06-13 | End: 2023-06-13

## 2023-06-12 RX ORDER — CHLOROTHIAZIDE SODIUM 500 MG/1
500 INJECTION INTRAVENOUS ONCE
Status: COMPLETED | OUTPATIENT
Start: 2023-06-12 | End: 2023-06-12

## 2023-06-12 RX ORDER — ONDANSETRON 2 MG/ML
4 INJECTION INTRAMUSCULAR; INTRAVENOUS EVERY 6 HOURS PRN
Status: DISCONTINUED | OUTPATIENT
Start: 2023-06-12 | End: 2023-06-19

## 2023-06-12 RX ORDER — SENNOSIDES 8.6 MG
8.6 TABLET ORAL DAILY PRN
Status: DISCONTINUED | OUTPATIENT
Start: 2023-06-12 | End: 2023-06-16

## 2023-06-12 RX ORDER — POLYETHYLENE GLYCOL 3350 17 G/17G
17 POWDER, FOR SOLUTION ORAL DAILY PRN
Status: DISCONTINUED | OUTPATIENT
Start: 2023-06-12 | End: 2023-06-18

## 2023-06-12 RX ORDER — B COMPLEX C NO.10/FOLIC ACID 900MCG/5ML
5 LIQUID (ML) ORAL DAILY
Status: DISCONTINUED | OUTPATIENT
Start: 2023-06-12 | End: 2023-06-16

## 2023-06-12 RX ORDER — VANCOMYCIN HYDROCHLORIDE 1 G/200ML
1000 INJECTION, SOLUTION INTRAVENOUS ONCE
Status: COMPLETED | OUTPATIENT
Start: 2023-06-12 | End: 2023-06-12

## 2023-06-12 RX ORDER — METHYLPREDNISOLONE SODIUM SUCCINATE 40 MG/ML
8 INJECTION, POWDER, LYOPHILIZED, FOR SOLUTION INTRAMUSCULAR; INTRAVENOUS EVERY 24 HOURS
Status: DISCONTINUED | OUTPATIENT
Start: 2023-06-12 | End: 2023-06-12

## 2023-06-12 RX ADMIN — METHYLPREDNISOLONE SODIUM SUCCINATE 8 MG: 40 INJECTION, POWDER, FOR SOLUTION INTRAMUSCULAR; INTRAVENOUS at 09:45

## 2023-06-12 RX ADMIN — METOPROLOL TARTRATE 5 MG: 5 INJECTION INTRAVENOUS at 15:02

## 2023-06-12 RX ADMIN — METOPROLOL TARTRATE 5 MG: 5 INJECTION INTRAVENOUS at 03:41

## 2023-06-12 RX ADMIN — PANTOPRAZOLE SODIUM 40 MG: 40 INJECTION, POWDER, FOR SOLUTION INTRAVENOUS at 07:45

## 2023-06-12 RX ADMIN — MYCOPHENOLATE MOFETIL 500 MG: 500 INJECTION, POWDER, LYOPHILIZED, FOR SOLUTION INTRAVENOUS at 20:29

## 2023-06-12 RX ADMIN — METOPROLOL TARTRATE 5 MG: 5 INJECTION INTRAVENOUS at 11:56

## 2023-06-12 RX ADMIN — VALGANCICLOVIR HYDROCHLORIDE 450 MG: 50 POWDER, FOR SOLUTION ORAL at 17:28

## 2023-06-12 RX ADMIN — PIPERACILLIN AND TAZOBACTAM 2.25 G: 2; .25 INJECTION, POWDER, FOR SOLUTION INTRAVENOUS at 03:41

## 2023-06-12 RX ADMIN — ASPIRIN 300 MG: 300 SUPPOSITORY RECTAL at 09:45

## 2023-06-12 RX ADMIN — HEPARIN SODIUM 5000 UNITS: 5000 INJECTION, SOLUTION INTRAVENOUS; SUBCUTANEOUS at 19:53

## 2023-06-12 RX ADMIN — TACROLIMUS 1 MG: 1 CAPSULE ORAL at 17:28

## 2023-06-12 RX ADMIN — TACROLIMUS 1 MG: 1 CAPSULE ORAL at 07:45

## 2023-06-12 RX ADMIN — BUMETANIDE 3 MG: 0.25 INJECTION INTRAMUSCULAR; INTRAVENOUS at 12:01

## 2023-06-12 RX ADMIN — METOPROLOL TARTRATE 5 MG: 5 INJECTION INTRAVENOUS at 19:53

## 2023-06-12 RX ADMIN — CHLOROTHIAZIDE SODIUM 500 MG: 500 INJECTION, POWDER, LYOPHILIZED, FOR SOLUTION INTRAVENOUS at 11:53

## 2023-06-12 RX ADMIN — HEPARIN SODIUM 5000 UNITS: 5000 INJECTION, SOLUTION INTRAVENOUS; SUBCUTANEOUS at 07:45

## 2023-06-12 RX ADMIN — LIDOCAINE HYDROCHLORIDE 10 ML: 20 SOLUTION ORAL; TOPICAL at 14:04

## 2023-06-12 RX ADMIN — VANCOMYCIN HYDROCHLORIDE 1000 MG: 1 INJECTION, SOLUTION INTRAVENOUS at 15:52

## 2023-06-12 RX ADMIN — METOPROLOL TARTRATE 5 MG: 5 INJECTION INTRAVENOUS at 07:45

## 2023-06-12 RX ADMIN — MICAFUNGIN SODIUM 100 MG: 50 INJECTION, POWDER, LYOPHILIZED, FOR SOLUTION INTRAVENOUS at 01:44

## 2023-06-12 RX ADMIN — Medication 5 ML: at 15:54

## 2023-06-12 RX ADMIN — PIPERACILLIN AND TAZOBACTAM 2.25 G: 2; .25 INJECTION, POWDER, FOR SOLUTION INTRAVENOUS at 08:32

## 2023-06-12 RX ADMIN — URSODIOL 250 MG: 250 TABLET, FILM COATED ORAL at 19:53

## 2023-06-12 RX ADMIN — MYCOPHENOLATE MOFETIL 500 MG: 500 INJECTION, POWDER, LYOPHILIZED, FOR SOLUTION INTRAVENOUS at 09:33

## 2023-06-12 RX ADMIN — SULFAMETHOXAZOLE AND TRIMETHOPRIM 1 TABLET: 400; 80 TABLET ORAL at 17:28

## 2023-06-12 ASSESSMENT — ACTIVITIES OF DAILY LIVING (ADL)
ADLS_ACUITY_SCORE: 38

## 2023-06-12 ASSESSMENT — ENCOUNTER SYMPTOMS: VOMITING: 1

## 2023-06-12 NOTE — PROGRESS NOTES
Stroke Code Nurse-Responder Note    Arrival Time to Stroke Code: 0700 (night stroke code responder at bedside upon arrival).     Stroke Code Team interventions:   Head CT completed, plan for MRA head/neck today.    ED/Bedside Nurse providing handoff: Annette LIPSCOMB     Time left for CT: CT done 0657     ED/Bedside Nurse given handoff (name/time): Annette LISPCOMB/ Radha Hedrick, RN

## 2023-06-12 NOTE — PLAN OF CARE
Major Shift Events: 1 URBCs transfused.    Neuros: Disoriented to place and/or situation. Lethargic with flat affect. Follows commands and Carolyn. PERRLA. Attempted twice to remove BL mitts, but pt immediately reached for NGT. Denies pain.  CV: Afebrile. A fib with rates 90s-110s. MAP goal >65. Levo weaned off.  RR: 2 L NC. Unable to wean. Increasingly coarse LS in BULs.  GI/: BM x2. Lux patent with UOP of 14-37 mL/hr. Strict NPO.  Drains: Lt abd MAE with minimal serous output.    Plan: Possible HD vs CRRT today. Line placement. Continue with cares as ordered.    For vital signs and complete assessments, please see documentation flowsheets.

## 2023-06-12 NOTE — PROGRESS NOTES
CLINICAL NUTRITION SERVICES - REASSESSMENT NOTE     Nutrition Prescription    Malnutrition Status:    Severe malnutrition in the context of acute illness    Recommendations already ordered by Registered Dietitian (RD):  - Discontinued calorie counts due to discontinued PO diet  - Discontinued protein modular  - Resume EN @ trickle rate via NDT (new rads tube): Novasource Renal @ 10 ml/hr    Future/Additional Recommendations:  - Follow for ability to advance EN via NDT  / resume prosource:  Goal of Novasource Renal (or equivalent) @ 40 ml/hr (960 ml) + prosource TF20 TID provides 2160 kcal (27 kcal/kg), 147 g pro (1.9 g/kg), 176 g CHO, 688 ml free water, and 0 g fiber daily  - SLP eval when appropriate for PO diet, given change in mental status     EVALUATION OF THE PROGRESS TOWARD GOALS   Diet: Regular with calorie counts--> NPO  Nutrition Support: (via gastric cortrak tube (retracted from NDT, then pulled by pt):  Novasource Renal (or equivalent) @ 40 ml/hr (960 ml) + prosource TF20 TID provides 2160 kcal (27 kcal/kg), 147 g pro (1.9 g/kg), 176 g CHO, 688 ml free water, and 0 g fiber daily    Intake:   - EN initiated ~ 36 hours post op, with inability to consistently run @ goal due to nausea, on hold since 6/10 with lack of access and ileus.  - Little to no po intake since surgery  - 7 day average enteral intake 235 ml + 1.1 pkts prosource TF20 provided 558 kcals and 43 g protein for ~ 1/3 estimated kcal/protein needs    NEW FINDINGS   - Events of weekend noted - FT removed by patient, NG placed for decompression d/t ileus, TPN consult ordered but PN not started due to no central access (line holiday)  - NG removed just now after successful clamp trial.    - FT replaced by radiology this afternoon - difficult placement with tip in the proximal duodenum - similar experience to RD placed cortrak feeding tube.    Stroke code called this am.  Will need SLP eval prior to resuming PO diet.    Weight: remains fluid up,  driest weight of 102.3 kg on admit    Labs: phos 7.7 (H), K+ 5.1.  BUN / Cr trending up while CRRT on hold.  BG low 100s.    Meds: reviewed and notable for medium resistance novolog, IV solumedrol. Miralax and Senna held.    GI: 3 BMs today    Renal: CRRT on hold for line holiday - likely resume tomorrow per nephrology     MALNUTRITION  % Intake: </= 50% for >/= 5 days (severe)  % Weight Loss: None noted, confounded by fluid  Subcutaneous Fat Loss: None observed  Muscle Loss: None observed  Fluid Accumulation/Edema: moderate edema  Malnutrition Diagnosis: Severe malnutrition in the context of acute illness    Previous Goals   Diet adv v nutrition support within 2-3 days.  Evaluation: Met    Previous Nutrition Diagnosis  Predicted inadequate nutrient intake (pro) related to increased needs s/p liver transplant  Evaluation: No longer applicable, nutrition diagnosis changed below    CURRENT NUTRITION DIAGNOSIS  Inadequate protein-energy intake related to EN interruptions with ileus as evidenced by EN meeting only 1/3 estimated kcal / protein needs over past week with minimal to no po intake      INTERVENTIONS  Implementation  Collaboration with other providers - SICU rounds, RN  Updated family on nutrition plan of care    Goals  Total avg nutritional intake to meet a minimum of 25 kcal/kg and 1.5 g PRO/kg daily (per dosing wt 78 kg).    Monitoring/Evaluation  Progress toward goals will be monitored and evaluated per protocol.    Rekha Mcdonough, RD, LD, CNSC  4A SICU RD pager: [phone]  Ascom: [phone]  Weekend/Holiday RD pager [phone]

## 2023-06-12 NOTE — PROGRESS NOTES
SURGICAL ICU PROGRESS NOTE  2023      PRIMARY TEAM: Transplant Surgery  PRIMARY PHYSICIAN: Dr. Clifton  REASON FOR CRITICAL CARE ADMISSION: Close hemodynamic monitoring, mechanical ventilation, CRRT  ADMITTING PHYSICIAN: Dr. 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 PMHx also includes coronary artery disease, alcohol overuse, obesity, ESRD on HD -- (IgA nephropathy + ANCA vasculitis), psoriatic arthritis, paroxysmal atrial fibrillation (on warfarin), DVT, recurrent c diff, and HTN.  He is now s/p  donor liver and kidney transplant on 23 with Dr. Clifton.  Intraop:   ml  500 ml Cell saver returned  4 uPRBC  2uPlt  1 uFFP  5L crystalloid  UOP 40cc prior to kidney, 90cc post kidney    PLAN:    Updates today (2023)  - Stroke code called ~0615 due to aphasia and acute non-focal neurological symptoms: head CT w/o contrast, MRA without contrast  - check ammonia levels due to concern for encephalopathy with acute neurological symptoms   - NG clamp trial x4 hours, plan to start trickle feeds if appropriate output  - Radiology consult to place post pyloric feeding tube (multiple unsuccessful bedside attempts by RD)  - Discontinue Zosyn   - Continue line holiday  - Continue chest physiotherapy   - Per transplant nephrology, no plans for dialysis today   - Diuresis: Bumex 3mg, diuril 500mg  - Repeat labs @1200    Neurological:  # Acute post-surgical pain   # Hx Alcohol use disorder, sober since 2016  - Monitor neurological status. Delirium preventions and precautions  - Sedation plan: None indicated  - Pain control: PRN oxycodone 5-10mg q4h, robaxin 500 QID  # Aphasia, right sided gaze preference, inability  to follow commands  # Encephalopathy  - Stroke code called ~0675 6/12  - Head CT w/o contrast with no acute intracranial pathology   - MRA head/neck pending (w/o contrast)  - Per discussion with patient's wife, symptoms are consistent with prior episodes of encephalopathy with increased ammonia levels (including gaze preference). Ammonia 18 this morning.     Pulmonary:   # Post operative ventilatory support, resolved  # Acute hypoxic respiratory failure requiring mechanical ventilation, resolved  # Bilateral small pleural effusions  - Successfully extubated 6/7 to NC. 6/10 increased oxygen requirement to 6L oxymask. On 2L NC this morning, continue to closely monitor.  - Aggressive pulmonary hygiene, IS, encourage OOB  - Supplemental O2 as needed to maintain SpO2 > 92%  - Continue chest physiotherapy TID today    Cardiovascular:    #Hx pAfib on PTA warfarin  # Afib with RVR, improving  #Hx CAD  #Hx HTN  #Hypotension, orthostatic  #Shock, likely septic- improving  - ECHO 6/5/23: afib, LVEF 55-60%  - MAP goal 60-85, SBP goal 110-160. 6/10 peripheral levophed started for persistent hypotension, off since 0100 6/12.   - PTA metoprolol succinate ER 25 daily, 6/7 started metoprolol 12.5 BID 6/7, 6/8 increased to 25mg BID. Increased to 25mg q8h by transplant team. Continue to hold oral metoprolol today, and administer IV metoprolol 5 mg q4h with hold parameters given NPO status.   - Continue to hold PTA warfarin  - ASA, PTA atorvastatin  - Cardiology consulted, appreciate recs   -- TTE completed, EF 55-60%, normal ventricular function   -- Allow for permissive tachycardia, long term goal HR < 110, okay for spikes to 120-130s   -- Anticoagulation for Afib when safe from a surgical standpoint, continue to hold off on anticoagulation today per transplant   -- signed off 6/11    Gastroenterology/Nutrition:  # Post-operative ileus, improving  # At risk for malnutrition  # Hx of decompensated alcohol related + hereditary  hemochromatosis (homozygous H63D) cirrhosis (MELD-Na 30) complicated by variceal bleeding s/p TIPS (10/1/2022) and hepatic encephalopathy now s/p DDLT   # Direct hyperbilirubinemia  -  repeat liver US: persistent low resistance waveforms and low resistive indices throughout hepatic artery system concerning for ischemia, stable velocities and upstroke  - LFTS overall trending down, TBili 3.0 (4.9), dBili 2.8 (4.78), alk phos 119 (115), AST 75 (105),  (264)  - Nutrition consulted and following, appreciate input. NJ placed , TF currently held due to ileus.   - Strict NPO, NGT to LIS. Clamping trial for 4 hours with 1 hour unclamped and evaluate output, if < 200 keep NG and start trickle feeds  - Post-pyloric feeding tube placement with radiology today (multiple unsuccessful attempts at bedside by RD)  - Bowel regimen, dulcolax suppository PRN  - PPI   - If unable to start feeding enterally today, will discuss TPN tomorrow per transplant  - Speech eval when neuro status improves     Renal/ Fluids/Electrolytes:   #Lactic acidosis, resolved  # Hx ESRD on HD MWF (IgA nephropathy + ANCA vasculitis) now s/p DDKT 23  # Delayed graft function  - Expect some delayed graft function with  donor kidney, urine output slowly increasing  - transplant nephrology consulted and following, appreciate expertise  - CRRT discontinued 6/10, dialysis line removed due to positive blood cultures. Per nephrology, hold off on dialysis today.   - Lux in place with about 100 mL of output since midnight. Given 3 mg bumex yesterday afternoon with about 160 mL output before midnight.    - Diuresis today per transplant nephrology: Bumex 3mg, diuril 500mg  - Repeat BMP at 1200    Endocrine:  # Stress hyperglycemia, improving  -Currently on high correction ISS. Goal to keep BG< 180 for optimal wound healing      ID:  # Stress leukocytosis, improving  # Post-transplant ppx per transplant  - WBC 7.7 from 7.2  -Perioperative  antibiotics: Zosyn x 48 hrs (completed 6/8), micafungin x 14 days  -Immunosuppression per transplant: thymoglobulin (held), tacrolimus 1mg BID sublingual, methylprednisolone taper   -Prophylactic regimen: Valganciclovir, Bactrim Ppx (held while NPO, okay per transplant)  # Enterococcus bacteremia  # Septic shock, improving  - 6/9 blood cultures x2 growing E. Faecium. 6/10 and 6/11 blood cx NGTD.   - Transplant ID consulted and following, appreciate recs  - Abx: linezolid, zosyn stop zosyn 6/12 per ID note due to no other organism growth   - Daily blood cultures until clear  - Continue line holiday today    Heme:     # Acute blood loss anemia   # Anemia of critical illness and chronic renal disease  # Hx hereditary hemochromatosis   # Thrombocytopenia 2/2 liver dysfunction  - Transfusion goals: INR <2, Platelets > 20, Fibrinogen > 200, Hgb >8.0  - Hgb 6.6, Plt 45, INR 1.27. Given 1 unit pRBC at 0520. Recheck Hgb at 12 pm.     Rheumatologic:  #Hx Gout  - Hold PTA prednisone 10mg daily  - receiving 8 mg methylprednisolone IV daily since 6/11 as part of planned steroid taper    Musculoskeletal:  # Weakness and deconditioning of critical illness   - Physical and occupational therapy consult, appreciate recommendations. Encourage increased time OOB when mental status appropriate.     Skin:  -Diligent skin care to prevent injury    General Cares/Prophylaxis:    DVT Prophylaxis: SQH q12, SCDs  GI Prophylaxis: Protonix   Restraints: Restraints for medical healing needed: Mitts needed while altered mental status due to pulling at lines.     Lines/ tubes/ drains:  -PIVs  -Lux catheter  -Intraabdominal MAE drain x1   -incisional wound vac (kidney incision)  - Axillary arterial line    Disposition:  - Surgical ICU      Leatha Solano, MS4     Resident Attestation   I was present with the medical student who participated in the service and in the documentation of the note. I have verified the history and personally performed the  physical exam and medical decision making. I agree with the assessment and plan of care as documented in the note and have edited where appropriate.      Lindsay Peña MD  General Surgery PGY-2  Date of Service: 06/12/2023    - - - - - - - - - - - - - - - - - - - - - - - - - - - - - - - - - - - - - - - - - - - - - -   INTERVAL EVENTS/SUBJECTIVE:     0600  Called at 6 am for worsening neurological status. Expressive aphasia, difficulty following commands, intermittently tracking with eyes. No focal deficits. Generalized difficulty with motor function. Awake and able to answer with head nodding to yes and no questions. Per nursing having right upper gaze preference at baseline.     0730  Continues to have difficulties speaking. Able to communicate with head nods. Denies any pain or nausea. Denies shortness of breath.     PHYSICAL EXAMINATION:  Temp:  [97.6  F (36.4  C)-98.6  F (37  C)] 98.4  F (36.9  C)  Pulse:  [] 113  Resp:  [9-18] 15  MAP:  [65 mmHg-120 mmHg] 81 mmHg  Arterial Line BP: ()/() 100/60  FiO2 (%):  [2 %] 2 %  SpO2:  [87 %-100 %] 97 %     General: NAD, appears comfortable  HEENT: NG secure in place. Jaundice appearance, scleral icterus- decreased.   Pulm/Resp: Breathing unlabored on 2L NC, equal chest rise, no audible wheezing. Coarse breath sounds bilaterally.   CV: irregularly irregular rhythm on monitor, normal rate  Abdomen: distended, chevron incision without drainage, RLQ prevena holding suctoin, MAE x 1 to bulb suction with serosanguinous output, soft reducible umbilical hernia.  : stauffer catheter in place with scant clear yellow urine output  MSK/Extremities: peripheral edema R>L, peripheral pulses intact, SCDs in place.  Skin: scattered purpura throughout face, chest, abdomen, and arms, jaundiced- decreased, no erythema or streaking   Neuro: Awake and alert, aphasia, responds appropriately with head nodding. Able to follow some commands such as wiggling toes and keeping  right arm elevated after passive arm raise with some delay. Unable to actively raise arms on own. Eyes do not follow to the right but track to the left.     LABS: Reviewed.   Arterial Blood Gases   Recent Labs   Lab 06/12/23  0535 06/11/23  1833 06/11/23  1244 06/10/23  1807   PH 7.34* 7.34* 7.34* 7.37   PCO2 38 39 39 42   PO2 176* 128* 124* 107*   HCO3 20* 21 21 24     Complete Blood Count   Recent Labs   Lab 06/12/23  0354 06/11/23  1650 06/11/23  0339 06/10/23  1807 06/10/23  0919 06/10/23  0341   WBC 7.7  --  7.2  --  10.0 8.9  8.9   HGB 6.6*  --  7.2* 7.7* 7.0* 7.6*  7.6*   PLT 45* 47* 44* 43* 71* 74*  74*     Basic Metabolic Panel  Recent Labs   Lab 06/12/23  0354 06/11/23  2323 06/11/23 1951 06/11/23  0605 06/11/23  0339 06/10/23  0607 06/10/23  0341 06/09/23  2221 06/09/23 2019     --   --   --  137  --  135*  --  137   POTASSIUM 5.1  --   --   --  4.9  --  4.8  --  4.9   CHLORIDE 100  --   --   --  102  --  102  --  104   CO2 19*  --   --   --  20*  --  22  --  20*   BUN 66.0*  --   --   --  48.0*  --  31.4*  --  32.2*   CR 3.83*  --   --   --  2.58*  --  1.42*  --  1.52*   *  113* 132* 120*   < > 120*   < > 128*   < > 144*  139*    < > = values in this interval not displayed.     Liver Function Tests  Recent Labs   Lab 06/12/23  0354 06/11/23  1650 06/11/23  0339 06/10/23  1807 06/10/23  0341 06/09/23  2019 06/09/23  1305 06/09/23  0413   AST 75*  --  105*  --  186*  --   --  405*   *  --  264*  --  450*  --   --  566*   ALKPHOS 119  --  151*  --  198*  --   --  177*   BILITOTAL 3.0*  --  4.9*  --  2.1*  --   --  1.5*   ALBUMIN 2.3*  --  2.4*  --  2.8* 2.5*   < > 2.4*   INR 1.27* 1.29* 1.30* 1.29* 1.24*  --    < > 1.60*    < > = values in this interval not displayed.     Pancreatic Enzymes  Recent Labs   Lab 06/07/23  0330 06/05/23  1605   LIPASE 59  --    AMYLASE 90 153*     Coagulation Profile  Recent Labs   Lab 06/12/23  0354 06/11/23  1650 06/11/23  0339 06/10/23  1807   INR  1.27* 1.29* 1.30* 1.29*   PTT 30 29 30 28       IMAGING:  Recent Results (from the past 24 hour(s))   XR Abdomen Port 1 View    Narrative    EXAM: XR ABDOMEN PORT 1 VIEW  6/11/2023 3:17 PM     HISTORY:  tube placement       TECHNIQUE: Single frontal radiograph of the abdomen    COMPARISON:  6/11/2023    FINDINGS:   AP portable supine radiograph the abdomen. Gastric tube tip sidehole  positioned in the distal thoracic esophagus with the tip in the  stomach. Postsurgical changes of the abdomen status post liver  transplantation with numerous surgical staples. Nonobstructive bowel  gas pattern. No pneumatosis or portal venous gas.Right upper quadrant  surgical drain. Bibasilar posterior opacities, better appreciated on  CT cannot 6/10/2023.      Impression    IMPRESSION: Gastric tube sidehole positioned in the distal thoracic  esophagus with the distal tip in the stomach. Recommend advancement.    I have personally reviewed the examination and initial interpretation  and I agree with the findings.    ADRIANA BARROS MD         SYSTEM ID:  [id number]   CT Head w/o Contrast    Narrative    CT HEAD W/O CONTRAST 6/12/2023 6:57 AM    History: stroke   ICD-10:    Comparison: 4/7/2023    Technique: Using multidetector thin collimation helical acquisition  technique, axial, coronal and sagittal CT images from the skull base  to the vertex were obtained without intravenous contrast.   (topogram) image(s) also obtained and reviewed.    Findings: There is no intracranial hemorrhage, mass effect, or midline  shift. Gray/white matter differentiation in both cerebral hemispheres  is preserved. Ventricles are proportionate to the cerebral sulci. The  basal cisterns are clear. Encephalomalacia in the right gyrus rectus.  Patchy periventricular and subcortical white matter hypodensities,  likely from chronic small vessel ischemic disease.    The bony calvaria and the bones of the skull base are normal. The  visualized portions of the  paranasal sinuses and mastoid air cells are  clear.      Impression    Impression:  1. No acute intracranial pathology.   2. 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    I have personally reviewed the examination and initial interpretation  and I agree with the findings.    LINA FITZPATRICK MD         SYSTEM ID:  [id number]

## 2023-06-12 NOTE — CONSULTS
[facility]    Stroke Consult Note    Reason for Consult: Stroke Code     Chief Complaint: No chief complaint on file.      HPI  Damion Quinones is a 67 year old male PMHx of A-fib, DVT,  decompensated alcohol + hemochromatosis (homozygous H63D) cirrhosis complicated by variceal bleeding s/p TIPS (10/1/2022) and hepatic encephalopathy + CKD (IgA nephropathy + ANCA vasculitis) s/p simultaneous liver kidney transplant on 6/6/2023.  Stroke alert was activated due to patient not being able to follow commands and answer bedside nurse's questions.  Per nursing report patient started getting progressively confused and less responsive around midnight.  At around 4 AM, he was not able to answer any of questions and follow commands which prompted the stroke.  He also started having right gaze preference.  Per report he was lethargic and slightly confused yesterday which thought to be secondary to tacrolimus that started for him recently.  He is currently getting IV linezolid, vancomycin and Zosyn for E.Faecium bacteremia.     Imaging Findings:    PRELIMINARY INTERPRETATION  Impression:  1. No acute intracranial pathology.   2. 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    Intravenous Thrombolysis  Not given due to:   - surgery/trauma within the past 14 days    Endovascular Treatment  Pending MRA head and neck    Impression   Damion Quinones is a 67 year old male PMHx of A-fib, recent kidney and liver transplant was assessed for acute onset of altered mental status.  Last known normal is not clear as he started being less interactive and more altered yesterday which has been progressively getting worse since midnight. Head CT was negative for acute intracranial pathology. Given the recent kidney transplant surgery and GFR of 17 CTA head and neck was deferred per transplant team request.  We will plan to perform MRA head and neck stent to evaluate for large vessel  "occlusion.    Recommendations  - MRA head and Neck STAT  - Final recommendation pending MRI result      The patient was discussed NCC Staff is Dr. Dooley.    Tawnya Bailey MD  Neurology Resident PGY4    ______________________________    Clinically Significant Risk Factors          # Hypocalcemia: Lowest iCa = 4.1 mg/dL in last 2 days, will monitor and replace as appropriate     # Hypoalbuminemia: Lowest albumin = 2 g/dL at 6/7/2023  3:30 AM, will monitor as appropriate  # Coagulation Defect: INR = 1.27 (Ref range: 0.85 - 1.15) and/or PTT = 30 Seconds (Ref range: 22 - 38 Seconds), will monitor for bleeding  # Thrombocytopenia: Lowest platelets = 43 in last 2 days, will monitor for bleeding   # Hypertension: Noted on problem list        # Obesity: Estimated body mass index is 37.5 kg/m  as calculated from the following:    Height as of this encounter: 1.702 m (5' 7\").    Weight as of this encounter: 108.6 kg (239 lb 6.7 oz).             Past Medical History   Past Medical History:   Diagnosis Date     Alcoholic cirrhosis of liver with ascites (H) 10/11/2019     C. difficile colitis      Coronary artery disease     [facility] 4/2023 - complete occlusion of RCA     ESRD (end stage renal disease) on dialysis (H)      History of hemochromatosis 10/11/2019     Hypertension      Obesity      PAF (paroxysmal atrial fibrillation) (H)      Psoriatic arthritis (H)      Past Surgical History   Past Surgical History:   Procedure Laterality Date     APPENDECTOMY      Removed at 18 Years Old      BENCH KIDNEY  6/6/2023    Procedure: Bench kidney;  Surgeon: Chad Clifton MD;  Location:  OR     BENCH LIVER  6/6/2023    Procedure: Bench liver;  Surgeon: Chad Clifton MD;  Location:  OR     COLONOSCOPY      2014 at [facility]      CV CORONARY ANGIOGRAM N/A 4/27/2023    Procedure: Coronary Angiogram;  Surgeon: Pablo Araujo MD;  Location:  HEART CARDIAC CATH LAB     EYE SURGERY Bilateral     Cataract "     HERNIA REPAIR      History of bilateral inguinal hernia repair: 10/28/2014. Open hernia repair: 10/2017. Abdominal wound exploration and debridement 2017     INSERT SHUNT PORTAL TRANSJUGULAR INTRAHEPTIC  2022     IR CVC TUNNEL PLACEMENT > 5 YRS OF AGE  2023     RETURN LIVER TRANSPLANT N/A 2023    Procedure: Return liver transplant. Intra-operative ultrasound;  Surgeon: Chad Clifton MD;  Location: [facility] OR     TRANSPLANT KIDNEY RECIPIENT  DONOR N/A 2023    Procedure: Transplant kidney recipient  donor, ureteral stent placement;  Surgeon: Chad Clifton MD;  Location: [facility] OR     TRANSPLANT LIVER RECIPIENT  DONOR N/A 2023    Procedure: Transplant liver recipient  donor;  Surgeon: Chad Clifton MD;  Location: [facility] OR     Medications   Home Meds  Prior to Admission medications    Medication Sig Start Date End Date Taking? Authorizing Provider   aspirin 81 MG EC tablet Take 1 tablet (81 mg) by mouth daily 23  Yes Leventhal, Thomas Michael, MD   atorvastatin (LIPITOR) 40 MG tablet Take 1 tablet (40 mg) by mouth daily 23  Yes Paloma Greer APRN CNP   B Complex-C-Folic Acid (MAKEDA-KENISHA) TABS Take 1 tablet by mouth daily 23  Yes Reported, Patient   bumetanide (BUMEX) 1 MG tablet Take 2 tablets (2 mg) by mouth 2 times daily for 180 days 3/29/23 9/25/23 Yes Gary Serrano MD   Calcium Carbonate-Vitamin D 500-3.125 MG-MCG TABS Take 1 tablet by mouth 2 times daily 22  Yes Reported, Patient   folic acid (FOLVITE) 1 MG tablet Take 5 mg by mouth daily 22  Yes Reported, Patient   lactulose (CHRONULAC) 10 GM/15ML solution Take 20 g by mouth 2 times daily 23  Yes Reported, Patient   metoprolol succinate ER (TOPROL XL) 25 MG 24 hr tablet Take 1 tablet by mouth daily at 2 pm 23  Yes Reported, Patient   midodrine (PROAMATINE) 2.5 MG tablet Take 1 tablet (2.5 mg) by mouth daily  Patient taking differently: Take 2.5  mg by mouth daily as needed (hypotension during dialysis) 5/10/23  Yes Paloma Greer APRN CNP   omeprazole 20 MG tablet Take 2 tablets (40 mg) by mouth 2 times daily  Patient taking differently: Take 40 mg by mouth daily 4/17/23  Yes Gary Serrano MD   predniSONE (DELTASONE) 10 MG tablet Take 10 mg by mouth daily 11/8/22  Yes Reported, Patient   spironolactone (ALDACTONE) 25 MG tablet Take 25 mg by mouth daily Start when OK from nephrology 4/24/23  Yes Reported, Patient   warfarin ANTICOAGULANT (COUMADIN) 1 MG tablet Take 2-3 tablets (2-3 mg) by mouth daily Adjust dose per INR as directed by ACC 5/31/23  Yes Gary Serrano MD   XIFAXAN 550 MG TABS tablet Take 550 mg by mouth 2 times daily 10/31/22  Yes Reported, Patient   zinc gluconate 50 MG tablet Take 50 mg by mouth daily   Yes Unknown, Entered By History   zinc oxide (DESITIN) 40 % paste Apply topically as needed for irritation apply to buttock to prevent bleeding with wiping 4/10/23  Yes Vi Esteban MD   ondansetron (ZOFRAN ODT) 4 MG ODT tab Take 1 tablet by mouth every 8 hours as needed 2/13/23   Reported, Patient       Scheduled Meds    aspirin  300 mg Rectal Daily     [Held by provider] aspirin  325 mg Oral Daily     B and C vitamin Complex with folic acid  5 mL Per Feeding Tube Daily     heparin ANTICOAGULANT  5,000 Units Subcutaneous Q12H     insulin aspart  1-6 Units Subcutaneous Q4H     iopamidol (ISOVUE-370)  75 mL Intravenous Once     linezolid  600 mg Intravenous Q12H     [Held by provider] methocarbamol  500 mg Oral 4x Daily     metoprolol  5 mg Intravenous Q4H     [Held by provider] metoprolol tartrate  25 mg Oral Q8H     micafungin  100 mg Intravenous Q24H     mycophenolate mofetil  500 mg Intravenous Q12H     pantoprazole  40 mg Intravenous Daily with breakfast     piperacillin-tazobactam  2.25 g Intravenous Q6H     polyethylene glycol  17 g Oral Daily     predniSONE  10 mg Oral Daily     protein modular  1 packet Per Feeding  Tube TID     sennosides  8.6 mg Oral BID     sodium chloride (PF)  3 mL Intravenous Q8H     sodium chloride (PF)  90 mL Intravenous Once     sulfamethoxazole-trimethoprim  1 tablet Oral Once per day on Mon Wed Fri     [Held by provider] tacrolimus  2 mg Oral BID IS     tacrolimus  1 mg Sublingual BID IS     ursodiol  250 mg Oral BID     valGANciclovir  450 mg Oral Once per day on Mon Thu    Or     valGANciclovir  450 mg Oral or NG Tube Once per day on Mon Thu       Infusion Meds    dextrose       dextrose       NaCl 0.45 % 1,000 mL infusion       norepinephrine Stopped (06/12/23 0110)       PRN Meds  sodium chloride 0.9%, albuterol, bisacodyl, dextrose, dextrose, glucose **OR** dextrose **OR** glucagon, fentaNYL, metoclopramide, NaCl 0.45 % 1,000 mL infusion, naloxone **OR** naloxone **OR** naloxone **OR** naloxone, ondansetron, ondansetron, oxyCODONE, phenol MT, prochlorperazine, sodium chloride (PF), sodium chloride (PF)    Allergies   No Known Allergies  Family History   Family History   Problem Relation Age of Onset     Lung Cancer Mother      Colon Cancer Father      Lung Cancer Brother      Heart Failure Brother      Kidney Disease Brother      Social History   Social History     Tobacco Use     Smoking status: Never     Passive exposure: Never     Smokeless tobacco: Never   Vaping Use     Vaping status: Never Used   Substance Use Topics     Alcohol use: Not Currently     Comment: Last ETOH use was 12/31/2021     Drug use: Not Currently       Review of Systems   The 10 point Review of Systems is negative other than noted in the HPI or here.        PHYSICAL EXAMINATION  Temp:  [97.6  F (36.4  C)-98.6  F (37  C)] 98.4  F (36.9  C)  Pulse:  [] 105  Resp:  [9-18] 15  MAP:  [65 mmHg-120 mmHg] 85 mmHg  Arterial Line BP: ()/() 110/70  FiO2 (%):  [2 %] 2 %  SpO2:  [87 %-100 %] 100 %     General Exam  General:  patient lying in bed without any acute distress    HEENT:   normocephalic/atraumatic  Cardio:  RRR  Pulmonary:  no respiratory distress  Abdomen:  soft  Extremities:  no edema  Skin:  intact     Neuro Exam  Eyes open, not making eye contact, not answering questions, does not follow commands, face looks symmetric, PERRLA, right gaze preference, able to pass midline intermittently, blinks to threat bilaterally, moves bilateral upper extremities against gravity spontaneously, no movement in bilateral lower extremity, withdraws to noxious stimuli in all 4 extremities      Dysphagia Screen  Per Nursing    Stroke Scales    NIHSS  1a. Level of Consciousness 2-->Not alert, requires repeated stimulation to attend, or is obtunded and requires strong or painful stimulation to make movements (not stereotyped)   1b. LOC Questions 2-->Answers neither question correctly   1c. LOC Commands 2-->Performs neither task correctly   2.   Best Gaze 1-->Partial gaze palsy, gaze is abnormal in one or both eyes, but forced deviation or total gaze paresis is not present   3.   Visual 0-->No visual loss   4.   Facial Palsy 0-->Normal symmetrical movements   5a. Motor Arm, Left 1-->Drift, limb holds 90 (or 45) degrees, but drifts down before full 10 seconds, does not hit bed or other support   5b. Motor Arm, Right 1-->Drift, limb holds 90 (or 45) degrees, but drifts down before full 10 secs, does not hit bed or other support   6a. Motor Leg, Left 3-->No effort against gravity, leg falls to bed immediately   6b. Motor Leg, right 3-->No effort against gravity, leg falls to bed immediately   7.   Limb Ataxia 0-->Absent   8.   Sensory 0-->Normal, no sensory loss   9.   Best Language 3-->Mute, global aphasia, no usable speech or auditory comprehension   10. Dysarthria 2-->Severe dysarthria, patients speech is so slurred as to be unintelligible in the absence of or out of proportion to any dysphasia, or is mute/anarthric   11. Extinction and Inattention  0-->No abnormality   Total 20 (06/12/23 9414)            Imaging  I personally reviewed all imaging; relevant findings per HPI.     Lab Results Data   CBC  Recent Labs   Lab 06/12/23  0354 06/11/23  1650 06/11/23  0339 06/10/23  1807 06/10/23  0919   WBC 7.7  --  7.2  --  10.0   RBC 2.16*  --  2.40*  --  2.34*   HGB 6.6*  --  7.2* 7.7* 7.0*   HCT 20.6*  --  22.6*  --  22.8*   PLT 45* 47* 44* 43* 71*     Basic Metabolic Panel    Recent Labs   Lab 06/12/23  0354 06/11/23  2323 06/11/23  0605 06/11/23  0339 06/10/23  0607 06/10/23  0341     --   --  137  --  135*   POTASSIUM 5.1  --   --  4.9  --  4.8   CHLORIDE 100  --   --  102  --  102   CO2 19*  --   --  20*  --  22   BUN 66.0*  --   --  48.0*  --  31.4*   CR 3.83*  --   --  2.58*  --  1.42*   *  113* 132*   < > 120*   < > 128*   NAZARIO 7.7*  --   --  7.8*  --  8.0*    < > = values in this interval not displayed.     Liver Panel  Recent Labs   Lab 06/12/23  0354 06/11/23  0339 06/10/23  0341   PROTTOTAL 4.5* 4.5* 5.0*   ALBUMIN 2.3* 2.4* 2.8*   BILITOTAL 3.0* 4.9* 2.1*   ALKPHOS 119 151* 198*   AST 75* 105* 186*   * 264* 450*     INR    Recent Labs   Lab Test 06/12/23  0354 06/11/23  1650 06/11/23 0339   INR 1.27* 1.29* 1.30*      Lipid Profile    Recent Labs   Lab Test 11/22/22  0848   CHOL 285*   HDL 79   *   TRIG 96     A1C    Recent Labs   Lab Test 06/06/23  2108 05/19/23  0956 11/22/22  0848   A1C 5.1 4.7 5.3     Troponin    Recent Labs   Lab 06/10/23  1012   CTROPT 139*          Stroke Code Data Data   Stroke Code Data  (for stroke code without tele)  Stroke code activated 06/12/23   0618   First stroke provider response 06/12/23   0620   Last known normal 06/12/23   0400   Time of discovery   (or onset of symptoms)         Head CT read by Stroke Neuro Dr/Provider         Was stroke code de-escalated?

## 2023-06-12 NOTE — PLAN OF CARE
Major Shift Events: Pt remains mute/unable to speak. Intermittently following commands. BUNN. vEEG initiated per Neuro Crit. Unable to discontinue bilateral mitts d/t line pulling. Normotensive, remains off pressors. AF 's. On 2L NC, coarse LS. BM x3. Lux w/ 7-60 mL/hr out. Post-pyloric feeding tube placed by radiology at bedside, NG removed. BUE weeping.   Plan: Monitor neuro & respiratory status.   For vital signs and complete assessments, please see documentation flowsheets.         Goal Outcome Evaluation:      Plan of Care Reviewed With: patient, spouse, child    Overall Patient Progress: no change

## 2023-06-12 NOTE — PROGRESS NOTES
"[facility]   Transplant Nephrology Progress Note  Date of Admission:  6/5/2023  Today's Date: 06/12/2023    Recommendations:  - no absolute RRT indications today; continued uptrend in Cr, some diuretic response yet remains oliguric. Trying to allow for line holiday as possible in the setting of bacteremia. Suspect will need RRT over the next 24-48hr with the current trend in Cr/K and when RRT is needed, recommend a temporary HD line.  - weekly CMV pcr while valcyte on hold  - if remains off bactrim and still unable to give later this week , consider alternatives  \"pentamidine\"    Assessment & Plan   # DDKT: DGF oliguric, no RRT indications   - Baseline Creatinine: ~ TBD   - Proteinuria: Not checked post transplant   - Date DSA Last Checked: Jun/2023      Latest DSA: Low level DSA (<1000 mfi) to CW9,    - BK Viremia: Not checked post transplant   - Kidney Tx Biopsy: No    -Pt was receiving CRRT from 6/6-6/10, stopped and LIJ tunneled line removed due to bacteremia.    -No acute indication for RRT today.off pressors 6/12.   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 TBili increasing. Slow to decrease LFTs 2/2 hypotension    # Immunosuppression: Tacrolimus immediate release (goal 5-8), Mycophenolate mofetil (dose 500 mg every 12 hours) and Prednisone (dose taper) to 10 mg daily    - Induction with Recent Transplant:  rATG total 4mg/kg, stopped due to sepsis   - Changes: Yes - agree with decreasing MMF in setting of bacteremia. Tac initially held 2/2 DGF per liver transplant team, started 6/9 PM. On prednisone 10 mg prior to Tx now on SM 8 mg iv qd    # Infection Prophylaxis:   - PJP: Sulfa/TMP (Bactrim), holding with ileus   - CMV: Valganciclovir (Valcyte), holding with ileus, CMV IgG Ab positive  - Thrush: Micafungin (Mycamine)  - Fungal: Micafungin (Mycamine)    # History of C.diff:   -Consider PO vanc " with abx, last cdiff screen neg    # E.Faecium bacteremia:   -Diagnosed on 6/9 BlCx. Pending sensitivities. Obtain cultures daily until clearance   -Broad spectrum abx (linezolid, vanc, zosyn x48h) , transplant ID consulted, appreciate recommendations   -Removed tunneled lines and all other lines on 6/10   -BCx NGTD 6/11 -If cultures don't clear consider ASHLEY. TTE w/ poor windows was negative on 6/10    # ANCA vasculitis:   -S/p Rituximab x2   -Obtain intermittent U/A     # Paroxysmal A-Fib: On coumadin and metoprolol ER 25 mg daily PTA. Consider switching to eliquis when kidney and liver are functioning.    -Agree with IV metoprolol 5mg q4h     # Hypotension: Hypotensive, off pressors 6/12;  Goal BP: MAP > 65   - Volume status: Total body volume up, but intravascularly hypovolemic  EDW ~ 96.4 kg (on HD)   - Changes: Not at this time.    # Confusion:   - aphasia+confusions: code stroke 6/11, CTH unremarkable, MRI brain pending   - neurology following    # Elevated Blood Glucose: Glucose generally running ~ 160s-200   - Management as per primary team.  On insulin gtt.    # Anemia in Chronic Renal Disease and acute blood loss: Hgb: Trend down      FEDERICO: No   - Iron studies: Unknown at this time, but checked with dialysis    -Transfuse for Hb<7    # Mineral Bone Disorder:   - Secondary renal hyperparathyroidism; PTH level: Minimally elevated ( pg/ml)        On treatment: None  - Vitamin D; level: Not checked recently, but was normal last check        On supplement: No  - Calcium; level: Normal when corrected for low alb       On supplement: No  - Phosphorus; level: High     On binder: No    # Ileus:   -NG placed 6/10    # Electrolytes:   - Potassium; level: High normal         On supplement: No  - Magnesium; level: High          On supplement: No  - Bicarbonate; level: Low normal         On supplement: No  - Sodium; level: Normal     # Gout:    - On prednisone 10 mg PO daily PTA. Eventual plan to wean prednisone  down to 5mg daily and then will consider cortisol stimulation test    # Transplant History:  Etiology of Kidney Failure: IgA nephropathy and ANCA vasculitis  Tx: Liver Tx (SLK)  Transplant: 2023 (Liver), 2023 (Kidney)  Significant changes in immunosuppression: None  Significant transplant-related complications: None    Recommendations were communicated to the primary team verbally       Darrel Patino MD   Pager: [phone]    Interval History   Confusion and aphasia in am, code stroke called, CTH unremarkable, awaiting MRI brain  UOP slightly up with diuretic challenge  Off pressors since 1 am     Review of Systems   4 point ROS was obtained and negative except as noted in the Interval History.    MEDICATIONS:    aspirin  300 mg Rectal Daily     [Held by provider] aspirin  325 mg Oral Daily     heparin ANTICOAGULANT  5,000 Units Subcutaneous Q12H     insulin aspart  1-6 Units Subcutaneous Q4H     [Held by provider] methocarbamol  500 mg Oral 4x Daily     [START ON 2023] methylPREDNISolone  8 mg Intravenous Q24H     metoprolol  5 mg Intravenous Q4H     [Held by provider] metoprolol tartrate  25 mg Oral Q8H     micafungin  100 mg Intravenous Q24H     mycophenolate mofetil  500 mg Intravenous Q12H     pantoprazole  40 mg Intravenous Daily with breakfast     polyethylene glycol  17 g Oral Daily     sennosides  8.6 mg Oral BID     sodium chloride (PF)  3 mL Intravenous Q8H     [Held by provider] sulfamethoxazole-trimethoprim  1 tablet Oral Once per day on      [Held by provider] tacrolimus  2 mg Oral BID IS     tacrolimus  1 mg Sublingual BID IS     [Held by provider] ursodiol  250 mg Oral BID     [Held by provider] valGANciclovir  450 mg Oral Once per day on     Or     [Held by provider] valGANciclovir  450 mg Oral or NG Tube Once per day on        dextrose       norepinephrine Stopped (23 0110)       Physical Exam   Temp  Av.9  F (36.6  C)  Min: 97  F (36.1  C)  Max:  "99.2  F (37.3  C)  Arterial Line BP  Min: 60/45  Max: 131/80  Arterial Line MAP (mmHg)  Av mmHg  Min: 52 mmHg  Max: 101 mmHg      Pulse  Av.5  Min: 68  Max: 159 Resp  Av.8  Min: 14  Max: 23  FiO2 (%)  Av.3 %  Min: 40 %  Max: 50 %  SpO2  Av.1 %  Min: 92 %  Max: 100 %    CVP (mmHg): 6 mmHgBP 90/76 (BP Location: Other (Comment))   Pulse 98   Temp 98.6  F (37  C)   Resp 12   Ht 1.702 m (5' 7\")   Wt 108.6 kg (239 lb 6.7 oz)   SpO2 98%   BMI 37.50 kg/m      Admit Weight: 102.3 kg (225 lb 8 oz)     GENERAL APPEARANCE: confused  RESP: lungs clear to auscultation - no rales, rhonchi or wheezes  CV: regular rhythm, normal rate, no rub, no murmur  EDEMA: moderate LE edema bilaterally  ABDOMEN: soft, nondistended, nontender, bowel sounds normal  MS: extremities normal - no gross deformities noted, no evidence of inflammation in joints, no muscle tenderness  SKIN: no rash  PSYCH: fatigued  Neuro: confused lethargic aphasic, follows commands  DIALYSIS ACCESS:  None    Data   All labs reviewed by me.  CMP  Recent Labs   Lab 23  1150 23  1145 23  0758 23  0354 23  0605 23  0339 06/10/23  0607 06/10/23  0341 23  2221 23  0415 23  0413   NA  --  138  --  136  --  137  --  135*  --  137   < > 135*   POTASSIUM  --  5.1  --  5.1  --  4.9  --  4.8  --  4.9   < > 5.0   CHLORIDE  --  102  --  100  --  102  --  102  --  104   < > 102   CO2  --  17*  --  19*  --  20*  --  22  --  20*   < > 22   ANIONGAP  --  19*  --  17*  --  15  --  11  --  13   < > 11   * 110* 94 108*  113*   < > 120*   < > 128*   < > 144*  139*   < > 131*   BUN  --  71.9*  --  66.0*  --  48.0*  --  31.4*  --  32.2*   < > 29.0*   CR  --  4.16*  --  3.83*  --  2.58*  --  1.42*  --  1.52*   < > 1.61*   GFRESTIMATED  --  15*  --  17*  --  27*  --  55*  --  51*   < > 47*   NAZARIO  --  7.8*  --  7.7*  --  7.8*  --  8.0*  --  7.6*   < > 8.2*   MAG  --   --   --  2.7*  --  " 2.6*  --  2.6*  --  2.6*   < > 2.7*   PHOS  --   --   --  7.7*  --  5.7*  --  5.1*  --  5.1*   < > 5.6*   PROTTOTAL  --   --   --  4.5*  --  4.5*  --  5.0*  --   --   --  4.6*   ALBUMIN  --   --   --  2.3*  --  2.4*  --  2.8*  --  2.5*   < > 2.4*   BILITOTAL  --   --   --  3.0*  --  4.9*  --  2.1*  --   --   --  1.5*   ALKPHOS  --   --   --  119  --  151*  --  198*  --   --   --  177*   AST  --   --   --  75*  --  105*  --  186*  --   --   --  405*   ALT  --   --   --  186*  --  264*  --  450*  --   --   --  566*    < > = values in this interval not displayed.     CBC  Recent Labs   Lab 06/12/23  1145 06/12/23  0354 06/11/23  1650 06/11/23  0339 06/10/23  1807 06/10/23  0919   HGB 8.1* 6.6*  --  7.2* 7.7* 7.0*   WBC 8.1 7.7  --  7.2  --  10.0   RBC 2.68* 2.16*  --  2.40*  --  2.34*   HCT 25.1* 20.6*  --  22.6*  --  22.8*   MCV 94 95  --  94  --  97   MCH 30.2 30.6  --  30.0  --  29.9   MCHC 32.3 32.0  --  31.9  --  30.7*   RDW 17.2* 16.6*  --  16.6*  --  17.5*   PLT 50* 45* 47* 44* 43* 71*     INR  Recent Labs   Lab 06/12/23  0354 06/11/23  1650 06/11/23  0339 06/10/23  1807   INR 1.27* 1.29* 1.30* 1.29*   PTT 30 29 30 28     ABG  Recent Labs   Lab 06/12/23  0535 06/11/23  1833 06/11/23  1244 06/10/23  1807   PH 7.34* 7.34* 7.34* 7.37   PCO2 38 39 39 42   PO2 176* 128* 124* 107*   HCO3 20* 21 21 24   O2PER 28 2 5 4      Urine Studies  Recent Labs   Lab Test 06/10/23  1613 06/05/23  1620 04/07/23  1246 02/13/23  0743   COLOR Orange* Yellow Yellow Yellow   APPEARANCE Cloudy* Clear Clear Clear   URINEGLC 30* Negative Negative Negative   URINEBILI Negative Negative Negative Negative   URINEKETONE Negative Negative Negative Negative   SG 1.021 1.012 1.011 1.014   UBLD Large* Moderate* Moderate* Negative   URINEPH 5.5 5.5 5.5 5.0   PROTEIN 100* 10* 10* 10*   NITRITE Negative Negative Negative Negative   LEUKEST Large* Negative Negative Negative   RBCU >182* 2 5* 1   WBCU 128* 7* 2 2     No lab results found.  PTH  Recent  Labs   Lab Test 05/19/23  0956   PTHI 67*     Iron Studies  Recent Labs   Lab Test 11/22/22  0848   IRON 68   *   IRONSAT 31   HOLA 429*       IMAGING:  All imaging studies reviewed by me.

## 2023-06-12 NOTE — PROGRESS NOTES
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 RTOR on  with concern for low flow in the renal graft - ex-lap, washout, closure with healthy appearing graft with intact arterial and venous flow.      s/p DBD simultaneous liver kidney transplant on 2023 with complex reconstruction of accessory right hepatic artery. POD #6  * RTOR on  with concern for low flow in the renal graft - ex-lap, washout, closure with healthy appearing graft with intact arterial and venous flow.      Transaminases and AP down, but Tbili at 3.0 (2.8 direct) from 4.9 (4.78 direct) on 23.   Ammonia level WNL  Liver US w/doppler 2023 - low waveforms and low resistive indices, but stable velocities and upstrokes.   Stent: No biliary stent was placed  Drains: 1 MAE drain in place (slight bile tinge)   - ASA 300mg daily (rectal due to ileus)  - Ursodiol 250mg BID (hold due to ileus)     H/o CKD related IgA nephropathy and ANCA vasculitis s/p  donor renal transplant on 2023 with ureteral/J stent placement, delayed graft function  -Cr 3.8 from 2.6  - Oliguria, 0.14 mL/kg/hr over last 18 hours, repeat Bumex 3 mg IV and Diuril today  - US: patent vessels  -Dialysis on HOLD due to line holiday for bacteremia, may require line and HD this afternoon vs tomorrow AM    Immunosuppressed status secondary to medications:   - Thymoglobulin: s/p 1mg/kg on , , , . Hold starting 6/10 due to concern for bacteremia and possible sepsis. Continue to hold versus discontinue.  - Steroid taper per protocol, continue Solu-med 8 mg IV today. PTA prednisone 10 mg for rheumatoid arthritis (see below). Will plan to  taper down to 5 mg Prednisone for chronic use.   - Restarted tacrolimus on 6/11/2023, goal 5-8. Sublingual dosing. Tac level on 6/12/23 8.3 (~10 hour trough), no change.   - MMF 500mg BID IV, dose reduced due to possible sepsis    Hematology:   Acute on chronic anemia: Hgb at 6.6 at 0354, then received 1 unit of blood. Hgb has been 7-9 in the setting of post-operative blood loss. No overt s/sx of blood loss.   Thrombocytopenia: Platelet count: 45 from 47 on 6/12/23. Continue to monitor.  INR has been stable 1.2-1.3  Fibrinogen activity 662 (611)    Cardiac:   Coronary artery disease: Continue ASA 81mg daily.               * Recommend statin resumption in the outpatient setting.      Paroxysmal atrial fibrillation: Rate down to ~100. Cardiology consulted: recommended low dose beta blockade.              * On IV metoprolol 4 mg q4hrs- Cardiology recommends discontinuing to allow tachycardia in the setting of acute illness              * Heparin 5000 units SC BID, Start heparin gtt/Coumadin this afternoon/tomorrow     Hypotension: Baseline hypotension with concern for sepsis with new finding of bacteremia. 6/10 Echo: EF 55-60%.              *Midodrine TID (last dose was 6/10/23)              *Pressor (Levophed gtt was stopped on 6/12/23)     Respiratory:  Acute hypoxic respiratory failure in the setting of post-operative state: Extubated on 6/7 and now on 2L O2.               * Continue pulmonary toilet and incentive spirometry    GI/Nutrition:   Hepatitis B s Ag +: Noted to be positive pre-transplant on 6/5/23, but not previously positive (1/26/2023). HBV DNA negative on 6/5/23. No clear at risk behavior.               * Follow up repeat: Hepatitis B s Ag (negative) and hepatitis B DNA (pending)     Mild protein calorie malnutrition: Considering starting TPN due to ileus if unable to start feeds today.      Post-operative ileus: Distended stomach and bowel loops noted on 6/10 AXR. NG placed.      Bowel regimen: Senna.  Recommend stopping Miralax due to volume    Endocrine:  Post-operative elevated blood glucoses: HgA1c 6.1% (6/6/2023)              * Discontinued Insulin gtt, will continue sliding scale prn     Rheumatology:  Psoriatic arthritis: Prior to admission was on prednisone 10mg daily, on prednisone taper as above.               *Goal to discharge on prednisone 5mg; may consider cortisol stimulation test given long term steroid use    : Remove stauffer per Nephrology and bladder scan Q shift.    Neurologic  Code stroke at 0615: Head CT and MRI negative. Plan for EEG per neuro.   Acute post operative pain:               * SICU giving PRN IV dilaudid, minimal use     Deconditioning/Weakness: OT and PT optimization     Alcohol use disorder: Continue sober supports post transplant. Continue complete sobriety.    ID  Bacteremia: 6/9 blood cultures + enterococcus faecium. No vegetations noted on TTE. 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 Zosyn restarted due to concern for sepsis. Currently on a line holiday until 6/13/23 ideally               * Daily BCx until clear              * Vanco 6/12-current               * Linezolid 6/10-6/12              * Zosyn 6/10-6/12 (empiric)    Blood cultures have been negative since 6/9/23. Urine culture on 6/10/23 did not show any growth.     Prophylaxis: Mechanical DVT ppx , fall, GI (PPI BID), fungal (micafungin), CMV (valganciclovir, CMV IgG Ab +), PJP (bactrim)     Disposition: SICU     GEORGE/Fellow/Resident Provider: Tito George MS4 and Rekha Hernandez [id number]    Faculty: Charleen Garcia MD  _________________________________________________________________    Interval History:  Overnight events: Code stroke at 0615 for right upper gaze preference, not following commands, word finding difficulty   -1 Unit(s) of blood administered 0440    ROS:   A 10-point review of systems was negative except as noted above.    Meds:    aspirin  300 mg Rectal Daily     [Held by provider] aspirin  325 mg  "Oral Daily     B and C vitamin Complex with folic acid  5 mL Per Feeding Tube Daily     heparin ANTICOAGULANT  5,000 Units Subcutaneous Q12H     insulin aspart  1-6 Units Subcutaneous Q4H     linezolid  600 mg Intravenous Q12H     [Held by provider] methocarbamol  500 mg Oral 4x Daily     metoprolol  5 mg Intravenous Q4H     [Held by provider] metoprolol tartrate  25 mg Oral Q8H     micafungin  100 mg Intravenous Q24H     mycophenolate mofetil  500 mg Intravenous Q12H     pantoprazole  40 mg Intravenous Daily with breakfast     piperacillin-tazobactam  2.25 g Intravenous Q6H     polyethylene glycol  17 g Oral Daily     predniSONE  10 mg Oral Daily     protein modular  1 packet Per Feeding Tube TID     sennosides  8.6 mg Oral BID     sodium chloride (PF)  3 mL Intravenous Q8H     sulfamethoxazole-trimethoprim  1 tablet Oral Once per day on Mon Wed Fri     [Held by provider] tacrolimus  2 mg Oral BID IS     tacrolimus  1 mg Sublingual BID IS     ursodiol  250 mg Oral BID     valGANciclovir  450 mg Oral Once per day on Mon Thu    Or     valGANciclovir  450 mg Oral or NG Tube Once per day on Mon Thu       Physical Exam:     Admit Weight: 102.3 kg (225 lb 8 oz)    Current vitals:   BP 90/76 (BP Location: Other (Comment))   Pulse 113   Temp 98.4  F (36.9  C)   Resp 15   Ht 1.702 m (5' 7\")   Wt 108.6 kg (239 lb 6.7 oz)   SpO2 97%   BMI 37.50 kg/m      Vital sign ranges:    Temp:  [97.6  F (36.4  C)-98.6  F (37  C)] 98.4  F (36.9  C)  Pulse:  [] 113  Resp:  [9-18] 15  MAP:  [65 mmHg-120 mmHg] 81 mmHg  Arterial Line BP: ()/() 100/60  FiO2 (%):  [2 %] 2 %  SpO2:  [87 %-100 %] 97 %    General Appearance: Non-verbal, responds to commands  Skin: Warm, perfused  Heart: Irregular rhythm, tachycardic   Lungs: expiratory wheeze bilaterally  Abdomen: Obese The abdomen is soft and diffusely tender. Abdominal incision is clean, dry, and intact. MAE drain in place with serosanguinous drainage with slight bile " tinge.  : stauffer is present.  Urine is yellow/pink.  Extremities: 2+ generalized edema. Able to move all four extremities independently.  Neurologic: Non-verbal but can respond to questions by nodding head. EOMI. Tremor is absent.    Data:   CMP  Recent Labs   Lab 06/12/23  0354 06/11/23  0605 06/11/23  0339 06/07/23  0337 06/07/23  0330 06/06/23  0547 06/05/23  1605     --  137   < > 138   < > 139   POTASSIUM 5.1  --  4.9   < > 4.4   < > 4.1   CHLORIDE 100  --  102   < > 105   < > 105   CO2 19*  --  20*   < > 17*   < > 21*   *  113*   < > 120*   < > 167*   < > 101*   BUN 66.0*  --  48.0*   < > 32.0*   < > 36.9*   CR 3.83*  --  2.58*   < > 2.76*   < > 3.85*   GFRESTIMATED 17*  --  27*   < > 25*   < > 17*   NAZARIO 7.7*  --  7.8*   < > 7.6*   < > 8.7*   ICAW 4.1*  --  4.2*   < >  --    < >  --    MAG 2.7*  --  2.6*   < > 2.1   < > 2.2   PHOS 7.7*  --  5.7*   < > 4.7*   < > 3.4   AMYLASE  --   --   --   --  90  --  153*   LIPASE  --   --   --   --  59  --   --    ALBUMIN 2.3*  --  2.4*   < > 2.0*   < > 2.9*   BILITOTAL 3.0*  --  4.9*   < > 1.6*   < > 0.6   ALKPHOS 119  --  151*   < > 81   < > 207*   AST 75*  --  105*   < > 1,834*   < >  --    *  --  264*   < > 654*   < > 84*    < > = values in this interval not displayed.     CBC  Recent Labs   Lab 06/12/23  0354 06/11/23  1650 06/11/23  0339 06/06/23  2315 06/06/23  2108   HGB 6.6*  --  7.2*   < > 9.1*   WBC 7.7  --  7.2   < > 12.3*   PLT 45* 47* 44*   < > 114*   A1C  --   --   --   --  5.1    < > = values in this interval not displayed.

## 2023-06-12 NOTE — PROGRESS NOTES
Calorie Count  Intake recorded for: 6/11  Total Kcals: 0 Total Protein: 0g  Kcals from Hospital Food: 0   Protein: 0g  Kcals from Outside Food (average):0 Protein: 0g  # Meals Ordered from Kitchen: 0  # Meals Recorded: No Food Intake Recorded  # Supplements Recorded: No Food Intake Recorded

## 2023-06-12 NOTE — PROGRESS NOTES
[facility]  Transplant Infectious Disease Progress Note     Patient:  Damion Quinones, Date of birth 1955, Medical record number [id number]  Date of Visit:  06/12/2023         Assessment and Recommendations:   Recommendations:  1. Stop linezolid and pip/tazo  2. Start vancomcyin, pharmacy to assist with dosing  3. Micafungin per transplant protocols  4. Appreciate line holiday, can replace ~48 hours after negative blood culture    Transplant Infectious Disease will continue to follow with you.      Assessment:  67 yoM with PMH of hemochromatosis and decompensated alcohol cirrhosis c/b variceal bleeding s/p TIPS (10/1/2022) and hepatic encephalopathy, IgA nephropathy and ANCA vaculitis causing CKD now s/p simultaneous liver and kideny transplant on 6/6/2023 with RTOR on 6/6/2023 with concern for low flow in the renal graft, with healthy appearing graft and intact arterial and venous flow, now with blood culture positive with GPC, enterococcus by verigene (neg Aisha and vanB) but rectal swab pre-transplant with VRE positivity.     #enterococcus faecium bacteremia  Patient with recent simultaneous liver and renal transplant with new onset sepsis and blood culture positive with enterococcus faecium (by verigene). Of note the Aisha and vanB genes (which confer Vancomycin resistance) were negative. 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 For this reason, we will cover with linezolid while awaiting culture susceptibilities. He also may have had an aspiration event and had increasing supplemental oxygen requirements over the weekend. We treated broadly with zosyn and linezolid over the weekend to cover VRE and empiric infections. Work-up with repeat blood and sputum cultures thus far negative and E faecium is S to vancomycin. We will recommend stopping pip/tazo and switching from linezolid to vancomycin for E faecium which is S to vancomycin       Other  Infectious Disease issues include:  - VRE colonization  - h/o Cdiff      - QTc interval: 429  On 6/10  - Bacterial prophylaxis: linezolid/zosyn  - Pneumocystis prophylaxis: tmp/sulfa  - Viral serostatus & prophylaxis: CMV+, EBV +, HSV 1?/2?, vzv ?  - Fungal prophylaxis: micafungin  - Risk factors to suggest check of Toxoplasma, Strongy, or Schisto serology?:  - Gamma globulin status: not recently checked  - Isolation status: Good hand hygiene.     Abdias Eisenberg MD   Pager [phone]    Discussed with Transplant ID attending, Dr. Shields             Interval History:   Had worsening confusion this morning and stroke code called. CTH negative, MRI done and read is pending at the time of this note.  He appears confused but is alert and moving all extremities.       Transplants:  6/6/2023 (Liver), 6/6/2023 (Kidney), Postoperative day:  6 (Liver), 6 (Kidney).  Coordinator Cindy Clay, Angelita Torres    Review of Systems:  Unable to perform 2/2 mental status         Current Medications & Allergies:       aspirin  300 mg Rectal Daily     [Held by provider] aspirin  325 mg Oral Daily     bumetanide  3 mg Intravenous Once     chlorothiazide  500 mg Intravenous Once     heparin ANTICOAGULANT  5,000 Units Subcutaneous Q12H     insulin aspart  1-6 Units Subcutaneous Q4H     linezolid  600 mg Intravenous Q12H     [Held by provider] methocarbamol  500 mg Oral 4x Daily     [START ON 6/13/2023] methylPREDNISolone  8 mg Intravenous Q24H     metoprolol  5 mg Intravenous Q4H     [Held by provider] metoprolol tartrate  25 mg Oral Q8H     micafungin  100 mg Intravenous Q24H     mycophenolate mofetil  500 mg Intravenous Q12H     pantoprazole  40 mg Intravenous Daily with breakfast     polyethylene glycol  17 g Oral Daily     sennosides  8.6 mg Oral BID     sodium chloride (PF)  3 mL Intravenous Q8H     [Held by provider] sulfamethoxazole-trimethoprim  1 tablet Oral Once per day on Mon Wed Fri     [Held by provider] tacrolimus  2 mg  Oral BID IS     tacrolimus  1 mg Sublingual BID IS     [Held by provider] ursodiol  250 mg Oral BID     [Held by provider] valGANciclovir  450 mg Oral Once per day on Mon Thu    Or     [Held by provider] valGANciclovir  450 mg Oral or NG Tube Once per day on Mon Thu       Infusions/Drips:    dextrose       norepinephrine Stopped (06/12/23 0110)       No Known Allergies         Physical Exam:     Patient Vitals for the past 24 hrs:   Temp Temp src Pulse Resp SpO2 Weight   06/12/23 1100 -- -- 106 -- 100 % --   06/12/23 1055 -- -- 114 -- 100 % --   06/12/23 1045 -- -- 103 -- 100 % --   06/12/23 1036 -- -- 107 20 99 % --   06/12/23 1000 -- -- 102 12 99 % --   06/12/23 0900 -- -- 101 12 99 % --   06/12/23 0800 -- -- 95 12 99 % --   06/12/23 0700 98.6  F (37  C) -- 114 10 100 % --   06/12/23 0600 -- -- 105 -- 100 % 108.6 kg (239 lb 6.7 oz)   06/12/23 0529 98.4  F (36.9  C) -- 113 15 97 % --   06/12/23 0519 98.4  F (36.9  C) -- 110 10 99 % --   06/12/23 0500 -- -- 105 (!) 9 97 % --   06/12/23 0400 98.3  F (36.8  C) Oral 108 11 97 % --   06/12/23 0300 -- -- 103 18 96 % --   06/12/23 0200 -- -- 117 12 98 % --   06/12/23 0115 -- -- 92 -- 96 % --   06/12/23 0100 -- -- 98 10 97 % --   06/12/23 0045 -- -- 91 -- 96 % --   06/12/23 0000 98.1  F (36.7  C) Oral 104 10 98 % --   06/11/23 2300 -- -- 106 14 98 % --   06/11/23 2200 -- -- 104 (!) 9 99 % --   06/11/23 2100 -- -- 114 10 93 % --   06/11/23 2015 -- -- 109 -- 94 % --   06/11/23 2000 98.6  F (37  C) Oral 98 17 98 % --   06/11/23 1945 -- -- 100 -- 99 % --   06/11/23 1930 -- -- 111 -- -- --   06/11/23 1915 -- -- 98 -- 96 % --   06/11/23 1900 -- -- 94 -- 97 % --   06/11/23 1804 -- -- -- -- 99 % --   06/11/23 1803 -- -- -- -- 98 % --   06/11/23 1802 -- -- -- -- 98 % --   06/11/23 1801 -- -- -- -- 99 % --   06/11/23 1800 98  F (36.7  C) Axillary -- -- 97 % --   06/11/23 1759 -- -- -- -- 98 % --   06/11/23 1758 -- -- -- -- 98 % --   06/11/23 1757 -- -- -- -- 96 % --   06/11/23 1756  -- -- -- -- 97 % --   06/11/23 1755 -- -- 93 -- 96 % --   06/11/23 1754 -- -- 89 -- 97 % --   06/11/23 1753 -- -- -- -- 97 % --   06/11/23 1752 -- -- 90 -- 97 % --   06/11/23 1751 -- -- 99 -- 97 % --   06/11/23 1750 -- -- 80 -- 97 % --   06/11/23 1749 -- -- 90 -- 97 % --   06/11/23 1748 -- -- 84 -- 97 % --   06/11/23 1747 -- -- 89 -- 97 % --   06/11/23 1746 -- -- 94 -- 96 % --   06/11/23 1745 -- -- 88 -- 97 % --   06/11/23 1744 -- -- 99 -- 97 % --   06/11/23 1743 -- -- 86 -- 96 % --   06/11/23 1742 -- -- 84 -- 97 % --   06/11/23 1741 -- -- 78 -- 97 % --   06/11/23 1740 -- -- 91 -- 96 % --   06/11/23 1739 -- -- 96 -- 96 % --   06/11/23 1738 -- -- 89 -- 97 % --   06/11/23 1737 -- -- 95 -- 95 % --   06/11/23 1736 -- -- 84 -- 95 % --   06/11/23 1735 -- -- 83 -- 95 % --   06/11/23 1734 -- -- 85 -- 95 % --   06/11/23 1733 -- -- 94 -- 95 % --   06/11/23 1732 -- -- 92 -- 96 % --   06/11/23 1731 -- -- 96 -- 94 % --   06/11/23 1730 -- -- 99 -- 94 % --   06/11/23 1729 -- -- 91 -- 96 % --   06/11/23 1728 -- -- 89 -- 96 % --   06/11/23 1727 -- -- 84 -- 97 % --   06/11/23 1726 -- -- 102 -- 96 % --   06/11/23 1725 -- -- 81 -- 97 % --   06/11/23 1724 -- -- 80 -- 98 % --   06/11/23 1723 -- -- 84 -- 98 % --   06/11/23 1722 -- -- 93 -- 98 % --   06/11/23 1721 -- -- 86 -- 98 % --   06/11/23 1720 -- -- 95 -- 98 % --   06/11/23 1719 -- -- 89 -- 96 % --   06/11/23 1718 -- -- 93 -- 96 % --   06/11/23 1717 -- -- 93 -- 97 % --   06/11/23 1716 -- -- 85 -- 99 % --   06/11/23 1715 -- -- 84 -- 98 % --   06/11/23 1714 -- -- 89 -- 100 % --   06/11/23 1713 -- -- 82 -- 100 % --   06/11/23 1712 -- -- 85 -- -- --   06/11/23 1711 -- -- 88 -- -- --   06/11/23 1710 -- -- 92 -- -- --   06/11/23 1709 -- -- 88 -- 96 % --   06/11/23 1708 -- -- 91 -- 96 % --   06/11/23 1707 -- -- 81 -- 96 % --   06/11/23 1706 -- -- 84 -- 98 % --   06/11/23 1705 -- -- 83 -- 97 % --   06/11/23 1704 -- -- 87 -- 98 % --   06/11/23 1703 -- -- 90 -- 97 % --   06/11/23 1702 -- -- 86  -- 98 % --   06/11/23 1701 -- -- 86 -- 98 % --   06/11/23 1700 -- -- 89 -- 97 % --   06/11/23 1645 -- -- 91 -- 99 % --   06/11/23 1630 -- -- 97 -- 98 % --   06/11/23 1615 -- -- 100 -- 99 % --   06/11/23 1600 98  F (36.7  C) Axillary 92 18 99 % --   06/11/23 1545 -- -- 86 -- 95 % --   06/11/23 1530 -- -- 96 -- 97 % --   06/11/23 1515 -- -- 93 -- 99 % --   06/11/23 1500 -- -- 97 16 98 % --   06/11/23 1445 -- -- 98 -- 98 % --   06/11/23 1430 -- -- 86 -- (!) 87 % --   06/11/23 1415 -- -- 91 -- 97 % --   06/11/23 1400 -- -- 84 18 98 % --   06/11/23 1345 -- -- 103 -- 95 % --   06/11/23 1330 -- -- 90 -- 98 % --   06/11/23 1315 -- -- -- -- 93 % --   06/11/23 1300 -- -- -- -- 96 % --   06/11/23 1245 -- -- -- -- 96 % --   06/11/23 1230 -- -- -- -- 96 % --   06/11/23 1215 -- -- 105 -- 96 % --   06/11/23 1200 98  F (36.7  C) Axillary 92 18 97 % --   06/11/23 1145 -- -- 105 -- 99 % --   06/11/23 1130 -- -- 99 -- 99 % --   06/11/23 1115 -- -- 88 -- 99 % --     Ranges for vital signs:  Temp:  [98  F (36.7  C)-98.6  F (37  C)] 98.6  F (37  C)  Pulse:  [] 106  Resp:  [9-20] 20  MAP:  [65 mmHg-120 mmHg] 95 mmHg  Arterial Line BP: ()/() 107/71  SpO2:  [87 %-100 %] 100 %  Vitals:    06/10/23 0400 06/11/23 0600 06/12/23 0600   Weight: 113.8 kg (250 lb 14.1 oz) 109.4 kg (241 lb 2.9 oz) 108.6 kg (239 lb 6.7 oz)       Physical Examination:  GENERAL:  well-developed, well-nourished man, in no acute distress with mits.  HEAD:  Head is normocephalic, atraumatic   EYES:  Eyes have anicteric sclerae without conjunctival injection   ENT:  Oropharynx is moist without exudates or ulcers.   NECK:  Supple.   LUNGS:  Clear to auscultation bilateral.   CARDIOVASCULAR:  Regular rate and rhythm with no murmur  ABDOMEN:  Incision with staples, well approximated without erythema or discharge   SKIN:  No acute rashes. PIV Line in place without any surrounding erythema or exudate.  NEUROLOGIC: Confused, non-verbal. Active x4  extremities         Laboratory Data:     No results found for: ACD4    Inflammatory Markers    Recent Labs   Lab Test 01/15/23  1107 12/16/22  1652 11/22/22  0848   SED 11  --   --    CRPI 19.20* 77.70*  --    PSA  --   --  0.69       Immune Globulin Studies   No lab results found.    Metabolic Studies       Recent Labs   Lab Test 06/12/23  0758 06/12/23 0354 06/11/23  0605 06/11/23  0339 06/10/23  0607 06/10/23  0341 06/06/23  2109 06/06/23  2108   NA  --  136  --  137  --  135*   < > 142   POTASSIUM  --  5.1  --  4.9  --  4.8   < > 5.0   CHLORIDE  --  100  --  102  --  102   < > 107   CO2  --  19*  --  20*  --  22   < > 20*   ANIONGAP  --  17*  --  15  --  11   < > 15   BUN  --  66.0*  --  48.0*  --  31.4*   < > 32.6*   CR  --  3.83*  --  2.58*  --  1.42*   < > 2.76*   GFRESTIMATED  --  17*  --  27*  --  55*   < > 25*   GLC 94 108*  113*   < > 120*   < > 128*   < > 195*   A1C  --   --   --   --   --   --   --  5.1   NAZARIO  --  7.7*  --  7.8*  --  8.0*   < > 8.6*   PHOS  --  7.7*  --  5.7*  --  5.1*   < >  --    MAG  --  2.7*  --  2.6*  --  2.6*   < >  --    LACT  --  0.6*  --  0.6*   < > 1.3   < >  --    PCAL  --   --   --   --   --  8.66*  --   --     < > = values in this interval not displayed.       Hepatic Studies    Recent Labs   Lab Test 06/12/23  0354 06/11/23  0339 06/10/23  0341   BILITOTAL 3.0* 4.9* 2.1*   DBIL 2.80* 4.78* 1.69*   ALKPHOS 119 151* 198*   PROTTOTAL 4.5* 4.5* 5.0*   ALBUMIN 2.3* 2.4* 2.8*   AST 75* 105* 186*   * 264* 450*       Pancreatitis testing    Recent Labs   Lab Test 06/07/23  0330 06/05/23  1605 01/23/23  0153 01/15/23  1107 12/16/22  1652 11/22/22  0848   AMYLASE 90 153*  --   --   --   --    LIPASE 59  --  149* 142* 85*  --    TRIG  --   --   --   --   --  96       Gout Labs    No lab results found.    Hematology Studies   Recent Labs   Lab Test 06/12/23  0354 06/11/23  1650 06/11/23  0339 06/10/23  1807 06/10/23  0919 06/10/23  0341 06/09/23  1305 06/09/23  0413   WBC 7.7   --  7.2  --  10.0 8.9  8.9   < > 10.9   ANEU 7.6  --   --   --   --  8.5*  --   --    ANEUTAUTO  --   --  6.7  --   --   --   --  10.4*   ALYM 0.0*  --   --   --   --  0.0*   < >  --    ALYMPAUTO  --   --  0.0*  --   --   --   --  0.0*   EUSEBIA 0.1  --   --   --   --  0.3   < >  --    AMONOAUTO  --   --  0.2  --   --   --   --  0.4   AEOS 0.0  --   --   --   --  0.0   < >  --    AEOSAUTO  --   --  0.0  --   --   --   --  0.0   ABSBASO  --   --  0.0  --   --   --   --  0.0   HGB 6.6*  --  7.2*   < > 7.0* 7.6*  7.6*   < > 8.1*   HCT 20.6*  --  22.6*  --  22.8* 24.2*  24.2*   < > 25.9*   PLT 45* 47* 44*   < > 71* 74*  74*   < > 52*    < > = values in this interval not displayed.       Clotting Studies    Recent Labs   Lab Test 06/12/23  0354 06/11/23  1650 06/11/23  0339 06/10/23  1807   INR 1.27* 1.29* 1.30* 1.29*   PTT 30 29 30 28       Iron Testing    Recent Labs   Lab Test 06/12/23  0354 12/16/22  1652 11/22/22  0848   IRON  --   --  68   FEB  --   --  219*   IRONSAT  --   --  31   HOLA  --   --  429*   MCV 95   < > 93    < > = values in this interval not displayed.       Arterial Blood Gas Testing    Recent Labs   Lab Test 06/12/23  0535 06/11/23  1833 06/11/23  1244 06/10/23  1807 06/10/23  0919 06/07/23  1637   PH 7.34* 7.34* 7.34* 7.37  --  7.43   PCO2 38 39 39 42  --  33*   PO2 176* 128* 124* 107*  --  71*   HCO3 20* 21 21 24  --  22   O2PER 28 2 5 4   < > 21    < > = values in this interval not displayed.        Thyroid Studies     Recent Labs   Lab Test 04/07/23  1150 12/16/22  1652 11/22/22  0848   TSH 3.37 2.86 4.91*   T4  --   --  1.09       Urine Studies     Recent Labs   Lab Test 06/10/23  1613 06/05/23  1620 04/07/23  1246 02/13/23  0743 01/23/23  0828   URINEPH 5.5 5.5 5.5 5.0 6.0   NITRITE Negative Negative Negative Negative Negative   LEUKEST Large* Negative Negative Negative Negative   WBCU 128* 7* 2 2 8*       CSF testing   No lab results found.    Invalid input(s): CADAM, EVPCR, ENTPCR,  ENTEROVIRUS    Medication levels    Recent Labs   Lab Test 06/12/23  0535   TACROL 8.3       Body fluid stats  No lab results found.    Microbiology:  Fungal testing  No lab results found.    Invalid input(s): HIFUN, FUNGL    Last Culture results   Culture   Date Value Ref Range Status   06/11/2023 No growth after 1 day  Preliminary   06/11/2023 No growth after 1 day  Preliminary   06/10/2023 No Growth  Final   06/09/2023 Positive on the 1st day of incubation (A)  Final   06/09/2023 Enterococcus faecium (AA)  Final     Comment:     1 of 1 bottlesSusceptibilities done on previous cultures   06/09/2023 Positive on the 1st day of incubation (A)  Final   06/09/2023 Enterococcus faecium (AA)  Final     Comment:     2 of 2 bottles   06/05/2023 Enterococcus faecium VRE (A)  Final   05/17/2023 No Growth  Final   04/07/2023 No Growth  Final   04/07/2023 No Growth  Final   02/13/2023 No Growth  Final   02/13/2023 No Growth  Final   01/23/2023 No Growth  Final   01/23/2023 No Growth  Final   12/16/2022 No Growth  Final   12/16/2022 No Growth  Final           Last checks of Clostridioides difficile testing  Recent Labs   Lab Test 06/11/23  1842 04/08/23  2252 01/15/23  1750 12/16/22  1916   CDBPCT Negative Positive* Positive* Positive*   CDIFFGDH  --  Positive* Positive* Positive*   CDIFFTOX  --  Negative Positive* Positive*       Infection Studies to assess Diarrhea   Recent Labs   Lab Test 01/15/23  1750 12/16/22 2028   EPSTX1 Not Detected Not Detected   EPSTX2 Not Detected Not Detected   EPCAMP Not Detected Not Detected   EPSALM Not Detected Not Detected   EPSHGL Not Detected Not Detected   EPVIB Not Detected Not Detected   EPROTA Not Detected Not Detected   EPNORO Not Detected Not Detected   EPYER Not Detected Not Detected       Virology:  Coronavirus-19 testing    Recent Labs   Lab Test 06/05/23  1409 04/07/23  1630 02/13/23  0743 01/23/23  0609 12/16/22  2025 07/29/20  0751   [id number] Negative Negative Negative  Negative   < >  --    COVIDPCREXT  --   --   --   --   --  Not Detected    < > = values in this interval not displayed.       Respiratory virus (non-coronavirus-19) testing    Recent Labs   Lab Test 23  0743   INFZA Negative   INFZB Negative   IRSV Negative       CMV viral loads  No lab results found.    CMV resistance testing  No lab results found.    No results found for: H6RES    No results found for: EBVRESINST, 21758, EBQTC, EBRES, 70955, 96214    BK Virus Testing   No lab results found.    Parvovirus Testing  No lab results found.    Invalid input(s): PRVRES    Adenovirus Testing  No lab results found.    Invalid input(s): ADENAB, ADENOVIRUS, ADQT    Imaging:  Recent Results (from the past 48 hour(s))   Echocardiogram Limited   Result Value    LVEF  55-60%    Narrative    [id number]  [id number]  [id number]  [id number]^NANCY^YISEL     [facility]  Ech[facility]  [address] [place] [address]     Name: EMILIANA SCHREIBER  MRN: [id number]  : 1955  Study Date: 06/10/2023 10:36 AM  Age: 67 yrs  Gender: Male  Patient Location: [facility]  Reason For Study: Atrial Fibrillation, CHF  Ordering Physician: YISEL CRUZ  Performed By: Abdulaziz Chiu     BSA: 2.2 m2  Height: 67 in  Weight: 250 lb  HR: 124  BP: 91/59 mmHg  ______________________________________________________________________________  Procedure  Limited Echocardiogram with portions of two-dimensional, color and spectral  Doppler performed. scanned sitting.  ______________________________________________________________________________  Interpretation Summary  Technically difficult study. The patient's rhythm is atrial fibrillation.  Global and regional left ventricular function is normal with an EF of 55-60%.  Global right ventricular function is normal.  No pericardial effusion is present.  ______________________________________________________________________________  Left Ventricle  Global and  regional left ventricular function is normal with an EF of 55-60%.  Mild to moderate concentric wall thickening consistent with left ventricular  hypertrophy is present.     Right Ventricle  Global right ventricular function is normal.     Pericardium  No pericardial effusion is present.     Compared to Previous Study  No significant changes noted.     ______________________________________________________________________________  MMode/2D Measurements & Calculations  IVSd: 1.5 cm  LVIDd: 3.3 cm  LVIDs: 2.4 cm  LVPWd: 1.3 cm     FS: 26.9 %  LV mass(C)d: 149.7 grams  LV mass(C)dI: 67.3 grams/m2  RWT: 0.78     ______________________________________________________________________________  Report approved by: MD Shaggy Castle 06/10/2023 11:49 AM         CT Chest Abdomen Pelvis w/o Contrast    Narrative    CT CHEST ABDOMEN PELVIS W/O CONTRAST 6/10/2023 12:10 PM    History: s/p liver kidney transplant    Comparison: CT abdomen and pelvis 1/23/2023.    Technique: Helical CT acquisition from the lung apices to the pubic  symphysis was obtained without intravenous contrast. Axial, coronal,  and sagittal reconstructions were obtained and reviewed.    Findings:   Devices: Left-sided abdominal drain terminating in the subhepatic  region. Enteric tube and a feeding tube terminating in the stomach.  Left IJV central venous catheter terminating in the right atrium and  right IJV central venous catheter terminating in the SVC. Lux  catheter catheter in the bladder. Nephroureteral stent in the right  lower quadrant transplant kidney.    CHEST:   Lungs: Bilateral small pleural effusions, right more than the left  with adjacent atelectasis. Diffuse bilateral groundglass opacities. No  focal consolidation. No pneumothorax or suspicious pulmonary nodule.  Airways: Central tracheobronchial tree is clear.  Vessels: Main pulmonary artery and aorta are normal in caliber. Normal  three-vessel arch.  Heart: Heart size  is normal without pericardial effusion.  Lymph nodes: No suspicious mediastinal lymphadenopathy.  Thyroid: Within normal limits.  Esophagus: Within normal limits    ABDOMEN PELVIS:  Liver: Postsurgical changes of liver transplantation. Small gas  containing perihepatic collection along the medial aspect of the  caudate lobe measuring 2.6 x 1.3 x 3.1 cm (series 7, image 109). Small  subhepatic fluid collection measuring 4.4 x 1.3 x 1.2 cm (series 7,  image 133).    Biliary system: Gallbladder is surgically absent. No intrahepatic or  extrahepatic biliary ductal dilatation.  Pancreas: No focal mass or dilation of the main pancreatic duct.  Stomach: Within normal limits.  Spleen: Within normal limits.  Adrenal glands: Within normal limits.  Kidneys: Atrophic native kidneys. No hydronephrosis, or stone. Right  lower quadrant transplant kidney is normal in size. Nephroureteral  stent in place with pigtail within the renal pelvis and distal pigtail  in the bladder. Foci of air in the renal collecting system is likely  due to the presence of the stent. No hydronephrosis. No perinephric  fluid collection.  Bladder: Within normal limits. Lux's catheter in situ.  Reproductive organs: Within normal limits.  Colon: Within normal limits.  Small Bowel: Mildly dilated small bowel loops measuring up to 3.9 cm  in diameter without transition point.    Lymph nodes: No intra-abdominal or pelvic lymphadenopathy.  Vasculature: No aortic aneurysm.  Peritoneum: Trace fluid and fat stranding extending from the  perihepatic region, through right perinephric region into the right  paracolic gutter. No pneumoperitoneum.    Soft tissues: Umbilical hernia containing nonobstructed bowel loops.   Bones: No suspicious osseous lesion. Degenerative changes in the  spine. Flowing anterior osteophytes along the thoracic vertebrae  suggestive of diffuse idiopathic skeletal hyperostosis.      Impression    IMPRESSION:   1. Status post liver and kidney  transplantation.   2. Small gas-containing fluid collection medial to the caudate lobe  may be postoperative seroma/hematoma but infection cannot be ruled  out.   3. Trace fluid and fat stranding extending from the perihepatic  region, through right perinephric region into the right paracolic  gutter, expected postoperative changes.  4. Mildly dilated small bowel loops without transition point  suggestive of postoperative ileus. Umbilical hernia containing  nonobstructed bowel loops.  5. Bilateral small pleural effusions, right more than the left, with  adjacent atelectasis. Diffuse mild bilateral groundglass opacities may  be due to atelectasis/edema.     ADRIANA BARROS MD         SYSTEM ID:  [id number]   XR Abdomen Port 1 View    Narrative    XR ABDOMEN PORT 1 VIEW  6/11/2023 2:30 AM      HISTORY: NGT placement    COMPARISON: Chest abdomen pelvis CT and abdominal radiograph 6/10/2023    FINDINGS: AP view of the abdomen. Enteric tube sidehole and tip  project over the stomach. Right upper quadrant drain in place.  Surgical changes of liver transplantation. Improved gaseous distention  of the stomach and slight improvement in multiple mildly distended  loops of small bowel. No definite pneumatosis or free air. Moderate  stool in the right sided colon. Partially visualized lung opacities  and pleural effusions.      Impression    IMPRESSION:   1. Enteric tube sidehole and tip project over the stomach.  2. Improved gaseous distention of the stomach and mild gaseous  distention of small bowel loops, which was most consistent with ileus  on prior CT    I have personally reviewed the examination and initial interpretation  and I agree with the findings.    HEBERT MAYERS MD         SYSTEM ID:  [id number]   US Renal Transplant with Doppler    Narrative    EXAMINATION: US RENAL TRANSPLANT,  6/11/2023 9:58 AM     COMPARISON: Ultrasound renal transplant 6/7/2023    HISTORY: Delayed graft function    TECHNIQUE:  Grey-scale, color  Doppler and spectral flow analysis.     FINDINGS:  The transplant kidney is located right lower quadrant, and measures  11.1 cm length. Parenchyma is of normal thickness and echogenicity. No  focal lesions. No hydronephrosis. No perinephric fluid collection.    There are 3 separate renal arteries visualized at the hilum with 2  sites of anastomosis.  Renal artery flow at the hilum:   Superior:  31 cm/sec peak systolic at hilum with RI of 0.73.  Mid:  28 cm/sec peak systolic at hilum with RI of 0.81.  Inferior:  31 cm/sec peak systolic at hilum with RI of 0.84.   Superior:  45 cm/sec peak systolic at anastomosis with RI of 0.80.  Inferior:  89 cm/sec peak systolic at anastomosis with a RI of 0.90.    Lower Pole Arcuate artery:  0.68 resistive index.     Mid pole Arcuate artery:  0.69 resistive index.     Upper Pole Arcuate artery:  0.69 resistive index.    Renal Vein Flow:  19 cm/sec at hilum.   62 cm/sec at anastomosis.    Iliac artery flow:  71 cm/sec peak systolic above anastomosis.  50 cm/sec peak systolic below anastomosis.    Iliac vein flow:  Patent above and below the anastomosis.    Bladder: Partially compressed with Lux in place.      Impression    IMPRESSION:  1.  Right lower quadrant kidney transplant with patent visualized  renal transplant vasculature. There are persistent borderline elevated  resistive indices throughout the 3 separate renal arteries at the  level of the hilum and anastomosis with normal low resistance arterial  waveforms, may represent sequelae of postoperative edema. Overall,  this is not substantially changed from ultrasound 6/7/2023.  2.  Normal gray scale evaluation of the right lower quadrant renal  transplant without perinephric fluid collections.    I have personally reviewed the examination and initial interpretation  and I agree with the findings.    HEBERT MAYERS MD         SYSTEM ID:  [id number]   XR Abdomen Port 1 View    Narrative    EXAM: XR ABDOMEN PORT 1 VIEW   6/11/2023 3:17 PM     HISTORY:  tube placement       TECHNIQUE: Single frontal radiograph of the abdomen    COMPARISON:  6/11/2023    FINDINGS:   AP portable supine radiograph the abdomen. Gastric tube tip sidehole  positioned in the distal thoracic esophagus with the tip in the  stomach. Postsurgical changes of the abdomen status post liver  transplantation with numerous surgical staples. Nonobstructive bowel  gas pattern. No pneumatosis or portal venous gas.Right upper quadrant  surgical drain. Bibasilar posterior opacities, better appreciated on  CT cannot 6/10/2023.      Impression    IMPRESSION: Gastric tube sidehole positioned in the distal thoracic  esophagus with the distal tip in the stomach. Recommend advancement.    I have personally reviewed the examination and initial interpretation  and I agree with the findings.    ADRIANA BARROS MD         SYSTEM ID:  [id number]   CT Head w/o Contrast    Narrative    CT HEAD W/O CONTRAST 6/12/2023 6:57 AM    History: stroke   ICD-10:    Comparison: 4/7/2023    Technique: Using multidetector thin collimation helical acquisition  technique, axial, coronal and sagittal CT images from the skull base  to the vertex were obtained without intravenous contrast.   (topogram) image(s) also obtained and reviewed.    Findings: There is no intracranial hemorrhage, mass effect, or midline  shift. Gray/white matter differentiation in both cerebral hemispheres  is preserved. Ventricles are proportionate to the cerebral sulci. The  basal cisterns are clear. Encephalomalacia in the right gyrus rectus.  Patchy periventricular and subcortical white matter hypodensities,  likely from chronic small vessel ischemic disease.    The bony calvaria and the bones of the skull base are normal. The  visualized portions of the paranasal sinuses and mastoid air cells are  clear.      Impression    Impression:  1. No acute intracranial pathology.   2. Chronic encephalomalacia in the right gyrus  rectus.    I have personally reviewed the examination and initial interpretation  and I agree with the findings.    LINA FITZPATRICK MD         SYSTEM ID:  [id number]

## 2023-06-12 NOTE — PLAN OF CARE
Goal Outcome Evaluation:      Plan of Care Reviewed With: patient, spouse, other (see comments)    Overall Patient Progress: no changeOverall Patient Progress: no change    Outcome Evaluation: plan to resume EN @ trickle rate via new rads NDT

## 2023-06-12 NOTE — PHARMACY-VANCOMYCIN DOSING SERVICE
Pharmacy Vancomycin Initial Note  Date of Service 2023  Patient's  1955  67 year old, male    Indication: Bacteremia    Current estimated CrCl = Estimated Creatinine Clearance: 20.8 mL/min (A) (based on SCr of 4.16 mg/dL (H)).    Creatinine for last 3 days  2023:  8:19 PM Creatinine 1.52 mg/dL  6/10/2023:  3:41 AM Creatinine 1.42 mg/dL  2023:  3:39 AM Creatinine 2.58 mg/dL  2023:  3:54 AM Creatinine 3.83 mg/dL; 11:45 AM Creatinine 4.16 mg/dL    Recent Vancomycin Level(s) for last 3 days  2023: 11:45 AM Vancomycin 14.1 ug/mL      Vancomycin IV Administrations (past 72 hours)                   vancomycin (VANCOCIN) 2,500 mg in sodium chloride 0.9 % 250 mL intermittent infusion (mg) 2,500 mg New Bag 06/10/23 0929                Nephrotoxins and other renal medications (From now, onward)    Start     Dose/Rate Route Frequency Ordered Stop    23 1600  vancomycin (VANCOCIN) 1000 mg in dextrose 5% 200 mL PREMIX         1,000 mg  200 mL/hr over 1 Hours Intravenous ONCE 23 14323 1431  vancomycin place issa - receiving intermittent dosing         1 each Intravenous SEE ADMIN INSTRUCTIONS 23 1431      06/10/23 2030  norepinephrine (LEVOPHED) 4 mg/250 mL NS infusion ADULT (PERIPHERAL)         0.03-0.125 mcg/kg/min × 111.9 kg (Dosing Weight)  12.6-52.5 mL/hr  Intravenous CONTINUOUS 06/10/23 2023      06/10/23 2000  tacrolimus (GENERIC EQUIVALENT) PO capsule for SUBLINGUAL use 1 mg         1 mg Sublingual 2 TIMES DAILY. 06/10/23 1820      23 1800  [Held by provider]  tacrolimus (GENERIC EQUIVALENT) capsule 2 mg        (Held by provider since Sat 6/10/2023 at 1820 by Jeremy Moffett MD.Hold Reason: NPO)    2 mg Oral 2 TIMES DAILY. 23 1146            Contrast Orders - past 72 hours (72h ago, onward)    Start     Dose/Rate Route Frequency Stop    23 1330  barium sulfate (VARIBAR THIN Liquid) 40 % oral suspension 8 g         20 mL Per Feeding Tube  ONCE      06/12/23 0700  iopamidol (ISOVUE-370) solution 75 mL  Status:  Discontinued         75 mL Intravenous ONCE 06/12/23 0649        Patient received 2500mg IV vancomycin 6/10/23 at 09:29AM with discontinuation of CRRT around the same time. Urine output minimal since CRRT discontinuation. STAT vancomycin level checked 6/12/23 = 14.1 ug/mL, will re-dose as if this was a post-HD level.         Plan:  1. Give 1000mg (~10mg/kg) IV vancomycin x1 dose today and will dose intermittently for now while renal function is in flux.   2. Vancomycin monitoring method: Renal Replacement Therapy  3. Vancomycin therapeutic monitoring goal: 15-20 mg/L  4. Pharmacy will check vancomycin levels as appropriate in 1-3 Days.    5. Serum creatinine levels will be ordered daily for the first week of therapy and at least twice weekly for subsequent weeks.      Shaunna Bolivar, PharmD, BCCCP

## 2023-06-12 NOTE — PROVIDER NOTIFICATION
Pt found to have new neuro changes at 0615. Pt exhibiting right upper gaze preference, not following commands, and word-finding difficulty. Pupils remained equal and reactive. SICU called to bedside. Stroke code called. Will plan to do head CT without contrast d/t recent kidney transplant. MRA also ordered. Will continue to monitor.

## 2023-06-13 ENCOUNTER — APPOINTMENT (OUTPATIENT)
Dept: PHYSICAL THERAPY | Facility: CLINIC | Age: 68
End: 2023-06-13
Attending: INTERNAL MEDICINE
Payer: COMMERCIAL

## 2023-06-13 ENCOUNTER — APPOINTMENT (OUTPATIENT)
Dept: NEUROLOGY | Facility: CLINIC | Age: 68
End: 2023-06-13
Attending: INTERNAL MEDICINE
Payer: COMMERCIAL

## 2023-06-13 ENCOUNTER — APPOINTMENT (OUTPATIENT)
Dept: OCCUPATIONAL THERAPY | Facility: CLINIC | Age: 68
End: 2023-06-13
Attending: INTERNAL MEDICINE
Payer: COMMERCIAL

## 2023-06-13 ENCOUNTER — APPOINTMENT (OUTPATIENT)
Dept: SPEECH THERAPY | Facility: CLINIC | Age: 68
End: 2023-06-13
Attending: PHYSICIAN ASSISTANT
Payer: COMMERCIAL

## 2023-06-13 LAB
ALBUMIN SERPL BCG-MCNC: 2.4 G/DL (ref 3.5–5.2)
ALP SERPL-CCNC: 138 U/L (ref 40–129)
ALT SERPL W P-5'-P-CCNC: 158 U/L (ref 10–50)
ANION GAP SERPL CALCULATED.3IONS-SCNC: 19 MMOL/L (ref 7–15)
ANION GAP SERPL CALCULATED.3IONS-SCNC: 20 MMOL/L (ref 7–15)
APTT PPP: 27 SECONDS (ref 22–38)
AST SERPL W P-5'-P-CCNC: 61 U/L (ref 10–50)
BASOPHILS # BLD MANUAL: 0 10E3/UL (ref 0–0.2)
BASOPHILS NFR BLD MANUAL: 0 %
BILIRUB DIRECT SERPL-MCNC: 1.96 MG/DL (ref 0–0.3)
BILIRUB SERPL-MCNC: 2.3 MG/DL
BUN SERPL-MCNC: 84.7 MG/DL (ref 8–23)
BUN SERPL-MCNC: 93.6 MG/DL (ref 8–23)
CA-I BLD-MCNC: 4.2 MG/DL (ref 4.4–5.2)
CA-I BLD-MCNC: 4.3 MG/DL (ref 4.4–5.2)
CALCIUM SERPL-MCNC: 8 MG/DL (ref 8.8–10.2)
CALCIUM SERPL-MCNC: 8.1 MG/DL (ref 8.8–10.2)
CHLORIDE SERPL-SCNC: 100 MMOL/L (ref 98–107)
CHLORIDE SERPL-SCNC: 100 MMOL/L (ref 98–107)
CREAT SERPL-MCNC: 4.77 MG/DL (ref 0.67–1.17)
CREAT SERPL-MCNC: 4.95 MG/DL (ref 0.67–1.17)
DEPRECATED HCO3 PLAS-SCNC: 16 MMOL/L (ref 22–29)
DEPRECATED HCO3 PLAS-SCNC: 17 MMOL/L (ref 22–29)
EOSINOPHIL # BLD MANUAL: 0 10E3/UL (ref 0–0.7)
EOSINOPHIL NFR BLD MANUAL: 0 %
ERYTHROCYTE [DISTWIDTH] IN BLOOD BY AUTOMATED COUNT: 17.3 % (ref 10–15)
FIBRINOGEN PPP-MCNC: 727 MG/DL (ref 170–490)
GFR SERPL CREATININE-BSD FRML MDRD: 12 ML/MIN/1.73M2
GFR SERPL CREATININE-BSD FRML MDRD: 13 ML/MIN/1.73M2
GLUCOSE BLDC GLUCOMTR-MCNC: 105 MG/DL (ref 70–99)
GLUCOSE BLDC GLUCOMTR-MCNC: 106 MG/DL (ref 70–99)
GLUCOSE BLDC GLUCOMTR-MCNC: 115 MG/DL (ref 70–99)
GLUCOSE BLDC GLUCOMTR-MCNC: 131 MG/DL (ref 70–99)
GLUCOSE BLDC GLUCOMTR-MCNC: 131 MG/DL (ref 70–99)
GLUCOSE BLDC GLUCOMTR-MCNC: 92 MG/DL (ref 70–99)
GLUCOSE SERPL-MCNC: 114 MG/DL (ref 70–99)
GLUCOSE SERPL-MCNC: 137 MG/DL (ref 70–99)
HCT VFR BLD AUTO: 24.3 % (ref 40–53)
HGB BLD-MCNC: 8 G/DL (ref 13.3–17.7)
INR PPP: 1.23 (ref 0.85–1.15)
LACTATE SERPL-SCNC: 0.7 MMOL/L (ref 0.7–2)
LYMPHOCYTES # BLD MANUAL: 0 10E3/UL (ref 0.8–5.3)
LYMPHOCYTES NFR BLD MANUAL: 0 %
MAGNESIUM SERPL-MCNC: 2.8 MG/DL (ref 1.7–2.3)
MAGNESIUM SERPL-MCNC: 2.9 MG/DL (ref 1.7–2.3)
MCH RBC QN AUTO: 30.7 PG (ref 26.5–33)
MCHC RBC AUTO-ENTMCNC: 32.9 G/DL (ref 31.5–36.5)
MCV RBC AUTO: 93 FL (ref 78–100)
MONOCYTES # BLD MANUAL: 0.2 10E3/UL (ref 0–1.3)
MONOCYTES NFR BLD MANUAL: 2 %
MYELOCYTES # BLD MANUAL: 0.1 10E3/UL
MYELOCYTES NFR BLD MANUAL: 1 %
NEUTROPHILS # BLD MANUAL: 8.3 10E3/UL (ref 1.6–8.3)
NEUTROPHILS NFR BLD MANUAL: 97 %
PHOSPHATE SERPL-MCNC: 8.5 MG/DL (ref 2.5–4.5)
PHOSPHATE SERPL-MCNC: 8.7 MG/DL (ref 2.5–4.5)
PLAT MORPH BLD: ABNORMAL
PLATELET # BLD AUTO: 55 10E3/UL (ref 150–450)
POLYCHROMASIA BLD QL SMEAR: SLIGHT
POTASSIUM SERPL-SCNC: 5.1 MMOL/L (ref 3.4–5.3)
POTASSIUM SERPL-SCNC: 5.1 MMOL/L (ref 3.4–5.3)
PROT SERPL-MCNC: 4.9 G/DL (ref 6.4–8.3)
RBC # BLD AUTO: 2.61 10E6/UL (ref 4.4–5.9)
RBC MORPH BLD: ABNORMAL
SODIUM SERPL-SCNC: 135 MMOL/L (ref 136–145)
SODIUM SERPL-SCNC: 137 MMOL/L (ref 136–145)
TACROLIMUS BLD-MCNC: 9.5 UG/L (ref 5–15)
TME LAST DOSE: NORMAL H
TME LAST DOSE: NORMAL H
WBC # BLD AUTO: 8.6 10E3/UL (ref 4–11)

## 2023-06-13 PROCEDURE — 250N000011 HC RX IP 250 OP 636: Performed by: TRANSPLANT SURGERY

## 2023-06-13 PROCEDURE — 258N000003 HC RX IP 258 OP 636: Performed by: SURGERY

## 2023-06-13 PROCEDURE — 999N000157 HC STATISTIC RCP TIME EA 10 MIN

## 2023-06-13 PROCEDURE — 83605 ASSAY OF LACTIC ACID: CPT

## 2023-06-13 PROCEDURE — 250N000011 HC RX IP 250 OP 636: Performed by: PHYSICIAN ASSISTANT

## 2023-06-13 PROCEDURE — 95718 EEG PHYS/QHP 2-12 HR W/VEEG: CPT | Performed by: PSYCHIATRY & NEUROLOGY

## 2023-06-13 PROCEDURE — 250N000011 HC RX IP 250 OP 636: Performed by: STUDENT IN AN ORGANIZED HEALTH CARE EDUCATION/TRAINING PROGRAM

## 2023-06-13 PROCEDURE — 97535 SELF CARE MNGMENT TRAINING: CPT | Mod: GO

## 2023-06-13 PROCEDURE — 97530 THERAPEUTIC ACTIVITIES: CPT | Mod: GP

## 2023-06-13 PROCEDURE — 80197 ASSAY OF TACROLIMUS: CPT | Performed by: NURSE PRACTITIONER

## 2023-06-13 PROCEDURE — C9113 INJ PANTOPRAZOLE SODIUM, VIA: HCPCS | Performed by: TRANSPLANT SURGERY

## 2023-06-13 PROCEDURE — 250N000009 HC RX 250

## 2023-06-13 PROCEDURE — 85014 HEMATOCRIT: CPT | Performed by: SURGERY

## 2023-06-13 PROCEDURE — 84100 ASSAY OF PHOSPHORUS: CPT | Performed by: PHYSICIAN ASSISTANT

## 2023-06-13 PROCEDURE — 82248 BILIRUBIN DIRECT: CPT | Performed by: SURGERY

## 2023-06-13 PROCEDURE — 84100 ASSAY OF PHOSPHORUS: CPT | Performed by: SURGERY

## 2023-06-13 PROCEDURE — 95711 VEEG 2-12 HR UNMONITORED: CPT

## 2023-06-13 PROCEDURE — 83735 ASSAY OF MAGNESIUM: CPT | Performed by: PHYSICIAN ASSISTANT

## 2023-06-13 PROCEDURE — 82330 ASSAY OF CALCIUM: CPT | Performed by: PHYSICIAN ASSISTANT

## 2023-06-13 PROCEDURE — 92610 EVALUATE SWALLOWING FUNCTION: CPT | Mod: GN

## 2023-06-13 PROCEDURE — 200N000002 HC R&B ICU UMMC

## 2023-06-13 PROCEDURE — 82330 ASSAY OF CALCIUM: CPT | Performed by: SURGERY

## 2023-06-13 PROCEDURE — 85610 PROTHROMBIN TIME: CPT | Performed by: SURGERY

## 2023-06-13 PROCEDURE — 92526 ORAL FUNCTION THERAPY: CPT | Mod: GN

## 2023-06-13 PROCEDURE — 250N000012 HC RX MED GY IP 250 OP 636 PS 637: Performed by: TRANSPLANT SURGERY

## 2023-06-13 PROCEDURE — 250N000013 HC RX MED GY IP 250 OP 250 PS 637: Performed by: PHYSICIAN ASSISTANT

## 2023-06-13 PROCEDURE — 80053 COMPREHEN METABOLIC PANEL: CPT | Performed by: SURGERY

## 2023-06-13 PROCEDURE — 99233 SBSQ HOSP IP/OBS HIGH 50: CPT | Mod: 24 | Performed by: INTERNAL MEDICINE

## 2023-06-13 PROCEDURE — 87040 BLOOD CULTURE FOR BACTERIA: CPT | Performed by: TRANSPLANT SURGERY

## 2023-06-13 PROCEDURE — 250N000013 HC RX MED GY IP 250 OP 250 PS 637: Performed by: NURSE PRACTITIONER

## 2023-06-13 PROCEDURE — 99233 SBSQ HOSP IP/OBS HIGH 50: CPT | Mod: 25 | Performed by: PSYCHIATRY & NEUROLOGY

## 2023-06-13 PROCEDURE — 85007 BL SMEAR W/DIFF WBC COUNT: CPT | Performed by: SURGERY

## 2023-06-13 PROCEDURE — 250N000012 HC RX MED GY IP 250 OP 636 PS 637: Performed by: PHYSICIAN ASSISTANT

## 2023-06-13 PROCEDURE — 999N000015 HC STATISTIC ARTERIAL MONITORING DAILY

## 2023-06-13 PROCEDURE — 99233 SBSQ HOSP IP/OBS HIGH 50: CPT | Mod: GC | Performed by: STUDENT IN AN ORGANIZED HEALTH CARE EDUCATION/TRAINING PROGRAM

## 2023-06-13 PROCEDURE — 250N000012 HC RX MED GY IP 250 OP 636 PS 637

## 2023-06-13 PROCEDURE — 94668 MNPJ CHEST WALL SBSQ: CPT

## 2023-06-13 PROCEDURE — 83735 ASSAY OF MAGNESIUM: CPT | Performed by: SURGERY

## 2023-06-13 PROCEDURE — 85384 FIBRINOGEN ACTIVITY: CPT | Performed by: PHYSICIAN ASSISTANT

## 2023-06-13 PROCEDURE — 85730 THROMBOPLASTIN TIME PARTIAL: CPT | Performed by: PHYSICIAN ASSISTANT

## 2023-06-13 PROCEDURE — 36415 COLL VENOUS BLD VENIPUNCTURE: CPT | Performed by: TRANSPLANT SURGERY

## 2023-06-13 PROCEDURE — 250N000013 HC RX MED GY IP 250 OP 250 PS 637: Performed by: SURGERY

## 2023-06-13 PROCEDURE — 250N000011 HC RX IP 250 OP 636: Performed by: SURGERY

## 2023-06-13 RX ORDER — CALCIUM GLUCONATE 20 MG/ML
2 INJECTION, SOLUTION INTRAVENOUS ONCE
Status: COMPLETED | OUTPATIENT
Start: 2023-06-13 | End: 2023-06-13

## 2023-06-13 RX ORDER — CHLOROTHIAZIDE SODIUM 500 MG/1
500 INJECTION INTRAVENOUS ONCE
Status: COMPLETED | OUTPATIENT
Start: 2023-06-13 | End: 2023-06-13

## 2023-06-13 RX ORDER — MYCOPHENOLATE MOFETIL 200 MG/ML
500 POWDER, FOR SUSPENSION ORAL
Status: DISCONTINUED | OUTPATIENT
Start: 2023-06-13 | End: 2023-06-14

## 2023-06-13 RX ORDER — METHOCARBAMOL 500 MG/1
500 TABLET, FILM COATED ORAL 4 TIMES DAILY PRN
Status: DISCONTINUED | OUTPATIENT
Start: 2023-06-13 | End: 2023-06-17

## 2023-06-13 RX ORDER — BUMETANIDE 0.25 MG/ML
3 INJECTION INTRAMUSCULAR; INTRAVENOUS ONCE
Status: COMPLETED | OUTPATIENT
Start: 2023-06-13 | End: 2023-06-13

## 2023-06-13 RX ORDER — METOPROLOL TARTRATE 25 MG/1
25 TABLET, FILM COATED ORAL EVERY 8 HOURS SCHEDULED
Status: DISCONTINUED | OUTPATIENT
Start: 2023-06-13 | End: 2023-06-17

## 2023-06-13 RX ORDER — PREDNISONE 10 MG/1
10 TABLET ORAL DAILY
Status: DISCONTINUED | OUTPATIENT
Start: 2023-06-14 | End: 2023-06-20

## 2023-06-13 RX ORDER — METOPROLOL TARTRATE 25 MG/1
25 TABLET, FILM COATED ORAL 2 TIMES DAILY
Status: DISCONTINUED | OUTPATIENT
Start: 2023-06-13 | End: 2023-06-13

## 2023-06-13 RX ORDER — METOPROLOL TARTRATE 1 MG/ML
5 INJECTION, SOLUTION INTRAVENOUS EVERY 4 HOURS PRN
Status: DISCONTINUED | OUTPATIENT
Start: 2023-06-13 | End: 2023-06-14

## 2023-06-13 RX ADMIN — METOPROLOL TARTRATE 5 MG: 5 INJECTION INTRAVENOUS at 04:11

## 2023-06-13 RX ADMIN — METHYLPREDNISOLONE SODIUM SUCCINATE 8 MG: 40 INJECTION, POWDER, FOR SOLUTION INTRAMUSCULAR; INTRAVENOUS at 07:26

## 2023-06-13 RX ADMIN — ASPIRIN 325 MG: 325 TABLET ORAL at 07:25

## 2023-06-13 RX ADMIN — PANTOPRAZOLE SODIUM 40 MG: 40 INJECTION, POWDER, FOR SOLUTION INTRAVENOUS at 07:26

## 2023-06-13 RX ADMIN — URSODIOL 250 MG: 250 TABLET, FILM COATED ORAL at 07:25

## 2023-06-13 RX ADMIN — METOPROLOL TARTRATE 25 MG: 25 TABLET, FILM COATED ORAL at 09:21

## 2023-06-13 RX ADMIN — METOPROLOL TARTRATE 5 MG: 5 INJECTION INTRAVENOUS at 20:49

## 2023-06-13 RX ADMIN — MYCOPHENOLATE MOFETIL 500 MG: 200 POWDER, FOR SUSPENSION ORAL at 17:51

## 2023-06-13 RX ADMIN — HEPARIN SODIUM 5000 UNITS: 5000 INJECTION, SOLUTION INTRAVENOUS; SUBCUTANEOUS at 07:25

## 2023-06-13 RX ADMIN — URSODIOL 250 MG: 250 TABLET, FILM COATED ORAL at 19:54

## 2023-06-13 RX ADMIN — METOPROLOL TARTRATE 25 MG: 25 TABLET, FILM COATED ORAL at 15:44

## 2023-06-13 RX ADMIN — TACROLIMUS 2 MG: 5 CAPSULE ORAL at 07:26

## 2023-06-13 RX ADMIN — Medication 5 ML: at 07:26

## 2023-06-13 RX ADMIN — METOPROLOL TARTRATE 5 MG: 5 INJECTION INTRAVENOUS at 14:03

## 2023-06-13 RX ADMIN — CALCIUM GLUCONATE 2 G: 20 INJECTION, SOLUTION INTRAVENOUS at 09:21

## 2023-06-13 RX ADMIN — TACROLIMUS 1.5 MG: 5 CAPSULE ORAL at 17:51

## 2023-06-13 RX ADMIN — BUMETANIDE 3 MG: 0.25 INJECTION INTRAMUSCULAR; INTRAVENOUS at 10:08

## 2023-06-13 RX ADMIN — CHLOROTHIAZIDE SODIUM 500 MG: 500 INJECTION, POWDER, LYOPHILIZED, FOR SOLUTION INTRAVENOUS at 10:08

## 2023-06-13 RX ADMIN — METOPROLOL TARTRATE 25 MG: 25 TABLET, FILM COATED ORAL at 21:56

## 2023-06-13 RX ADMIN — MYCOPHENOLATE MOFETIL 500 MG: 200 POWDER, FOR SUSPENSION ORAL at 08:25

## 2023-06-13 RX ADMIN — MICAFUNGIN SODIUM 100 MG: 50 INJECTION, POWDER, LYOPHILIZED, FOR SOLUTION INTRAVENOUS at 00:04

## 2023-06-13 RX ADMIN — HEPARIN SODIUM 5000 UNITS: 5000 INJECTION, SOLUTION INTRAVENOUS; SUBCUTANEOUS at 19:54

## 2023-06-13 RX ADMIN — METOPROLOL TARTRATE 5 MG: 5 INJECTION INTRAVENOUS at 07:25

## 2023-06-13 RX ADMIN — METOPROLOL TARTRATE 5 MG: 5 INJECTION INTRAVENOUS at 00:00

## 2023-06-13 ASSESSMENT — ACTIVITIES OF DAILY LIVING (ADL)
ADLS_ACUITY_SCORE: 34
ADLS_ACUITY_SCORE: 38
ADLS_ACUITY_SCORE: 34
ADLS_ACUITY_SCORE: 38
ADLS_ACUITY_SCORE: 38
ADLS_ACUITY_SCORE: 34
ADLS_ACUITY_SCORE: 38

## 2023-06-13 NOTE — PROGRESS NOTES
VEEG monitoring preliminary results:    VEEG has been running for over one hour.  EEG showed moderate to severe generalized slowing with mixed theta and delta. Findings are consistent with moderate to severe diffuse encephalopathy.  No epileptiform activities, no clinical or electrographic seizures were observed.    Thomas Batres MD  Neurology

## 2023-06-13 NOTE — PROGRESS NOTES
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 RTOR on  with concern for low flow in the renal graft - ex-lap, washout, closure with healthy appearing graft with intact arterial and venous flow.      s/p DBD simultaneous liver kidney transplant on 2023 with complex reconstruction of accessory right hepatic artery. POD #7  * RTOR on  with concern for low flow in the renal graft - ex-lap, washout, closure with healthy appearing graft with intact arterial and venous flow.      LFTs declining.   Ammonia level WNL  Liver US w/doppler 2023 - low waveforms and low resistive indices, but stable velocities and upstrokes.   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 Hold starting 6/10 due to concern for bacteremia and possible sepsis.   - Steroid taper per protocol, completed, now resumed PTA prednisone 10 mg for rheumatoid arthritis (see below). Will plan to taper down to 5 mg Prednisone for chronic use.   - Restarted tacrolimus on 2023, goal 5-8. Sublingual dosing through .    - MMF 500mg BID, dose reduced due to sepsis    Hematology:   Acute on  chronic anemia: Hgb stable ~8 after 1 unit PRBC on 6/12.   Thrombocytopenia: Platelet count: 55. Continue to monitor.  INR has been stable 1.2-1.3  Fibrinogen activity >700    Cardiac:   Coronary artery disease: Continue ASA 81mg daily.               * Recommend statin resumption in the outpatient setting.      Paroxysmal atrial fibrillation: HR . Cardiology consulted: recommended low dose beta blockade.              * On metoprolol 25 mg BID              * Heparin 5000 units SC BID, Start heparin gtt/Coumadin following line placement      Hypotension: Baseline hypotension with concern for sepsis with new finding of bacteremia. 6/10 Echo: EF 55-60%.              *Midodrine TID (last dose was 6/10/23)              *Pressor (Levophed gtt was stopped on 6/12/23)     Respiratory:  Acute hypoxic respiratory failure in the setting of post-operative state: Extubated on 6/7 and now on 2L O2.               * Continue pulmonary toilet and incentive spirometry    GI/Nutrition:   Hepatitis B s Ag +: Noted to be positive pre-transplant on 6/5/23, but not previously positive (1/26/2023). HBV DNA negative on 6/5/23. No clear at risk behavior.               * Follow up repeat: Hepatitis B s Ag (negative) and hepatitis B DNA (not detected)     Mild protein calorie malnutrition: NDT in place, tolerating feeds at 10cc/hour, will advance as tolerated to a goal of 40cc/hour.      Post-operative ileus: Distended stomach and bowel loops noted on 6/10 AXR. NG placed and now removed.      Bowel regimen: Senna.     Endocrine:  Post-operative elevated blood glucoses: HgA1c 6.1% (6/6/2023)              * Discontinued Insulin gtt, will continue sliding scale prn     Rheumatology:  Psoriatic arthritis: Prior to admission was on prednisone 10mg daily, on prednisone taper as above.               *Goal to discharge on prednisone 5mg; may consider cortisol stimulation test given long term steroid use    : Removed stauffer per Nephrology and  bladder scan Q shift.    Neurologic  Code stroke at 0615: Head CT and MRI negative. EEG completed with moderate to severe generalized slowing consistent with diffuse encephalopathy.   Acute post operative pain:               * SICU giving PRN IV dilaudid, minimal use     Deconditioning/Weakness: OT and PT optimization     Alcohol use disorder: Continue sober supports post transplant. Continue complete sobriety.    ID  Bacteremia: 6/9 blood cultures + enterococcus faecium. 6/11 and 6/12 cultures NGTD. No vegetations noted on TTE. ID consulted. Currently on a line holiday              * Daily BCx until clear              * Vanco 6/12-current               * Linezolid 6/10-6/12              * Zosyn 6/10-6/12 (empiric)    Urine culture on 6/10/23 did not show any growth.     Prophylaxis: Mechanical DVT ppx (heparin subcutaneous) , fall, GI (PPI BID), fungal (micafungin), CMV (valganciclovir, CMV IgG Ab +), PJP (bactrim)     Disposition: SICU     GEORGE/Fellow/Resident Provider: Rekha Hernandez PA-C [id number]    Faculty: Charleen Garcia MD  _________________________________________________________________    Interval History:  Overnight events: Opening eyes, alert. Non verbal.     ROS:   A 10-point review of systems was negative except as noted above.    Meds:    aspirin  325 mg Oral Daily     B and C vitamin Complex with folic acid  5 mL Oral Daily     heparin ANTICOAGULANT  5,000 Units Subcutaneous Q12H     insulin aspart  1-6 Units Subcutaneous Q4H     metoprolol tartrate  25 mg Oral BID     micafungin  100 mg Intravenous Q24H     mycophenolate  500 mg Oral BID IS     [START ON 6/14/2023] pantoprazole  40 mg Per Feeding Tube QAM AC     [START ON 6/14/2023] predniSONE  10 mg Per Feeding Tube Daily     protein modular  1 packet Per Feeding Tube TID     sodium chloride (PF)  3 mL Intravenous Q8H     sulfamethoxazole-trimethoprim  1 tablet Oral Once per day on Mon Wed Fri     tacrolimus  1.5 mg ORAL OR NJ TUBE BID IS     ursodiol   "250 mg Oral BID     valGANciclovir  450 mg Oral Once per day on Mon Thu    Or     valGANciclovir  450 mg Oral or NG Tube Once per day on Mon Thu     vancomycin place issa - receiving intermittent dosing  1 each Intravenous See Admin Instructions       Physical Exam:     Admit Weight: 102.3 kg (225 lb 8 oz)    Current vitals:   BP 90/76 (BP Location: Other (Comment))   Pulse 108   Temp 97.2  F (36.2  C) (Axillary)   Resp 12   Ht 1.702 m (5' 7\")   Wt 108.5 kg (239 lb 3.2 oz)   SpO2 94%   BMI 37.46 kg/m      Vital sign ranges:    Temp:  [97.2  F (36.2  C)-98.1  F (36.7  C)] 97.2  F (36.2  C)  Pulse:  [] 108  Resp:  [12-16] 12  MAP:  [78 mmHg-108 mmHg] 100 mmHg  Arterial Line BP: ()/(62-90) 133/81  SpO2:  [92 %-100 %] 94 %    General Appearance: NAD  Skin: Warm, perfused  Heart: Irregular rhythm, tachycardic   Lungs: expiratory wheeze bilaterally  Abdomen: Obese The abdomen is soft and diffusely tender. Abdominal incision is clean, dry, and intact. MAE drain in place with serosanguinous drainage with slight bile tinge.  : stauffer is present.  Urine is yellow/pink.  Extremities: 2+ generalized edema. Able to move all four extremities independently.  Neurologic: Non-verbal but can respond to questions by nodding head. EOMI. Tremor is absent.    Data:   CMP  Recent Labs   Lab 06/13/23  0737 06/13/23  0351 06/12/23  1150 06/12/23  1145 06/12/23  0758 06/12/23  0354 06/07/23  0337 06/07/23  0330   NA  --  137  --  138  --  136   < > 138   POTASSIUM  --  5.1  --  5.1  --  5.1   < > 4.4   CHLORIDE  --  100  --  102  --  100   < > 105   CO2  --  17*  --  17*  --  19*   < > 17*   GLC 92 105*  114*   < > 110*   < > 108*  113*   < > 167*   BUN  --  84.7*  --  71.9*  --  66.0*   < > 32.0*   CR  --  4.77*  --  4.16*  --  3.83*   < > 2.76*   GFRESTIMATED  --  13*  --  15*  --  17*   < > 25*   NAZARIO  --  8.0*  --  7.8*  --  7.7*   < > 7.6*   ICAW  --  4.2*  --   --   --  4.1*   < >  --    MAG  --  2.9*  --   --   " --  2.7*   < > 2.1   PHOS  --  8.7*  --   --   --  7.7*   < > 4.7*   AMYLASE  --   --   --   --   --   --   --  90   LIPASE  --   --   --   --   --   --   --  59   ALBUMIN  --  2.4*  --   --   --  2.3*   < > 2.0*   BILITOTAL  --  2.3*  --   --   --  3.0*   < > 1.6*   ALKPHOS  --  138*  --   --   --  119   < > 81   AST  --  61*  --   --   --  75*   < > 1,834*   ALT  --  158*  --   --   --  186*   < > 654*    < > = values in this interval not displayed.     CBC  Recent Labs   Lab 06/13/23  0351 06/12/23  1145 06/06/23  2315 06/06/23  2108   HGB 8.0* 8.1*   < > 9.1*   WBC 8.6 8.1   < > 12.3*   PLT 55* 50*   < > 114*   A1C  --   --   --  5.1    < > = values in this interval not displayed.

## 2023-06-13 NOTE — PROGRESS NOTES
[facility]   Clinical Swallow Evaluation    23 [phone]   Appointment Info   Signing Clinician's Name / Credentials (SLP) Nitza Last MS CCC SLP   General Information   Onset of Illness/Injury or Date of Surgery 23   Referring Physician Nick Menjivar PA-C   Patient/Family Therapy Goal Statement (SLP) None stated   Pertinent History of Current Problem Per provider documentation - ASSESSMENT:  Damion Quinones is a 67 year old male with a past medical history significant for decompensated alcohol related + hereditary hemochromatosis (homozygous H63D) cirrhosis complicated by variceal bleeding s/p TIPS (10/1/2022) and hepatic encephalopathy. PMHx also includes coronary artery disease, alcohol overuse, obesity, ESRD on HD  (IgA nephropathy + ANCA vasculitis), psoriatic arthritis, paroxysmal atrial fibrillation (on warfarin), DVT, recurrent c diff, and HTN.  He is now s/p  donor liver and kidney transplant on 23 with Dr. Clifton.   General Observations Pt will alert, doesn't consistently follow commands or answer questions. Pt's daughter and spouse are present   Type of Evaluation   Type of Evaluation Swallow Evaluation   Oral Motor   Oral Musculature unable to assess due to poor participation/comprehension   Mucosal Quality dry   Dentition (Oral Motor)   Dentition (Oral Motor) adequate dentition   Facial Symmetry (Oral Motor)   Facial Symmetry (Oral Motor) unable/difficult to assess   Lip Function (Oral Motor)   Lip Range of Motion (Oral Motor) unable/difficult to assess   Tongue Function (Oral Motor)   Tongue ROM (Oral Motor) unable/difficult to assess   Vocal Quality/Secretion Management (Oral Motor)   Vocal Quality (Oral Motor) dysphonic;harsh;hoarse  (Pt did not speak during evaluation. However, his family reports a very deep voice and harsh sounding. They feel it is worse now even than when the ET tube was removed. They note, he has pulled his NG  tube a number of times as well.)   General Swallowing Observations   Past History of Dysphagia None PTA per pt's family's report   Respiratory Support (General Swallowing Observations) nasal cannula;supplemental oxygen   Current Diet/Method of Nutritional Intake (General Swallowing Observations, NIS) NPO;nasogastric tube (NG)   Swallowing Evaluation Clinical swallow evaluation   Clinical Swallow Evaluation   Feeding Assistance dependent   Clinical Swallow Evaluation Textures Trialed thin liquids;pureed   Clinical Swallow Eval: Thin Liquid Texture Trial   Mode of Presentation, Thin Liquids spoon;fed by clinician   Volume of Liquid or Food Presented ice chips x 3, teaspoons of water x 3   Oral Phase of Swallow premature pharyngeal entry   Pharyngeal Phase of Swallow   (No overt s/sx of aspiration, possible eyes watering)   Diagnostic Statement Possible eyes watering after intake. Fluctuating mentation increasing risk for aspiration   Clinical Swallow Evaluation: Puree Solid Texture Trial   Mode of Presentation, Puree spoon;fed by clinician   Volume of Puree Presented 2 oz   Oral Phase, Puree WFL   Pharyngeal Phase, Puree intact   Diagnostic Statement No immediate overt s/sx of aspiration. Delayed congested cough may be related to secretion management vs aspiration concern.   Esophageal Phase of Swallow   Patient reports or presents with symptoms of esophageal dysphagia No   Swallowing Recommendations   Diet Consistency Recommendations NPO;ice chips only   Supervision Level for Intake 1:1 supervision needed   Mode of Delivery Recommendations bolus size, small;slow rate of intake   Monitoring/Assistance Required (Eating/Swallowing) cue for finger/lingual sweep if oral pocketing present;stop eating activities when fatigue is present;optimize oral intake to minimize need for tube feeding;monitor for cough or change in vocal quality with intake   Recommended Feeding/Eating Techniques (Swallow Eval) maintain upright sitting  position for eating   Medication Administration Recommendations, Swallowing (SLP) Alternative means of medication administration   Instrumental Assessment Recommendations VFSS (videofluoroscopic swallowing study)  (Pending clinical presentation a VFSS may be needed)   General Therapy Interventions   Planned Therapy Interventions Dysphagia Treatment   Dysphagia treatment Oropharyngeal exercise training;Modified diet education;Instruction of safe swallow strategies   Clinical Impression   Criteria for Skilled Therapeutic Interventions Met (SLP Eval) Yes, treatment indicated   SLP Diagnosis Oropharyngeal dysphagia   Risks & Benefits of therapy have been explained evaluation/treatment results reviewed;care plan/treatment goals reviewed;participants included;patient;spouse/significant other;daughter   Clinical Impression Comments Pt seen for clinical swallow evaluation. It is limited by pt's mentation, per neurology pt presents with moderate-severe encephalopathy. Pt tolerated small amount of ice chips, teaspoons of water, and puree without overt s/sx of aspiration. However, possible eyes watering after teaspoons of water. Pt unreliably able to voice or follow commands. Suspect impaired vocal quality per family's report with the potential for reduced airway protection.     Oropharyngeal dysphagia secondary to acute illness. Recommend NPO status with frequent oral cares, ice chips if alert and upright. Initiate SLP services for dysphagia.   SLP Total Evaluation Time   Eval: oral/pharyngeal swallow function, clinical swallow Minutes (52037) 1

## 2023-06-13 NOTE — PROGRESS NOTES
CLINICAL NUTRITION SERVICES - BRIEF NOTE     Nutrition Prescription    RECOMMENDATIONS FOR MDs/PROVIDERS TO ORDER:  None currently. Adjusted TF orders to begin advancing TF towards goal rate per discussion in rounds.     Recommendations already ordered by Registered Dietitian (RD):    Advance TF 10 ml/hr every 4 hours to goal and resuming Prosource TF20: Novasource Renal (or equivalent) @ 40 ml/hr (960 ml) + prosource TF20 TID provides 2160 kcal (27 kcal/kg), 147 g pro (1.9 g/kg), 176 g CHO, 688 ml free water, and 0 g fiber daily     Future/Additional Recommendations:    Monitor lytes    Monitor TF adv/intakes       New findings:   TF at 10 ml/hr, per rounds can begin advancing to goal rate. Advancing 10 ml/hr Q4H, with this advancement pt will be to goal rate today.      GI:    Last BM: 06/13/23    Weight:    Most Recent Weight: 108.5 kg (239 lb 3.2 oz)  on 6/13/23 via Bed scale    Body mass index is 37.46 kg/m .    Meds:    Vitamin B and C complex w/ folic acid    novolog insulin    Mycophenolate    micafungin    protonix    Bactrim    Tacrolimus    Ursodiol      Labs:    Phos elevated and trending up, possibly starting phos binders   06/12/23 08:38 06/12/23 11:45 06/13/23 03:51   Sodium  138 137   Potassium  5.1 5.1   Chloride  102 100   Carbon Dioxide (CO2)  17 (L) 17 (L)   Urea Nitrogen  71.9 (H) 84.7 (H)   Creatinine  4.16 (H) 4.77 (H)   GFR Estimate  15 (L) 13 (L)   Calcium  7.8 (L) 8.0 (L)   Anion Gap  19 (H) 20 (H)   Magnesium   2.9 (H)   Phosphorus   8.7 (H)   Albumin   2.4 (L)   Protein Total   4.9 (L)   Alkaline Phosphatase   138 (H)   ALT   158 (H)   AST   61 (H)   Ammonia 18     Bilirubin Direct   1.96 (H)   Bilirubin Total   2.3 (H)   Calcium Ionized Whole Blood   4.2 (L)   Glucose  110 (H) 114 (H)   Lactic Acid   0.7         Implementation  Collaboration with other providers  Enteral Nutrition - Modify rate  Feeding tube flush       RD to follow per protocol      [place], RDN, LD  [address]A SICU RD  pager: [phone]  Ascom: [id number]  Weekend/Holiday RD pager: [phone]

## 2023-06-13 NOTE — PLAN OF CARE
"Major Shift Events: No acute events today.   Neuro: Encephalopathy slightly improving, intermittently able to say \"yes\" & \"no.\" Intermittent commands, seems to respond better to nursing staff when family is not at bedside. Bilateral mitts discontinued. EEG removed.  CV: Intermittently hypertensive w/ -150's. Remains in AF, rates 100-120's, PRN metoprolol ordered to keep HR > 110.   Resp: Weaned to RA.  : Lux intermittently leaking despite flushing, output 12-90 mL/hr, color is more pink-tinged compared to yesterday. Unable to bladder scan d/t ascites.   GI: BMx3. NJ w/ TF @ 40 mL/hr (goal) w/ standard FWF.   Skin: See flowsheets. BUE continue to weep. Abdominal incisions leaking serous fluid, Primapores placed.  Plan: Maintain HR > 110. Continue to monitor closely. Transfer to  when a bed becomes available.   For vital signs and complete assessments, please see documentation flowsheets.       Goal Outcome Evaluation:      Plan of Care Reviewed With: patient, spouse, child    Overall Patient Progress: no change           "

## 2023-06-13 NOTE — PROGRESS NOTES
Neurocritical Care Progress Note    Reason for critical care admission: Liver/Kidney transplant  Admitting Team: Transplant surgery  Date of Service:  06/13/2023  Date of Admission:  6/5/2023  Hospital Day: 9    Assessment/Plan  Damion Quinones is a 67 year old male PMHx of A-fib, recent kidney and liver transplant with reported acute onset of altered mental status.      Stroke code was called. Last known normal was not clear with possible change in exam starting 6/11. Head CT was negative for acute intracranial pathology. MRI and MRA were obtained in which there was small area of diffusion restriction on DWI sequences, however there was no correlation on ADC.  If this were to be a small infarct, it would be subacute, occurring prior onset of pt's symptoms, and would not consistent with the degree of encephalopathy the patient is experienceing.  EEG was also obtained to rule out non-consulvive status epilepticus.  EEG was consistent with severe encephalopathy, and no seizure activity was noted.    Pt's family at [facility] reports that pt has had similar presentations that were attributed to hepatic encephalopathy.  Pt's current presentation is not consistent with stroke or seizure as an etiology for encephalopathy.  This is more likely to be secondary to his significant metabolic derangements.    #Encephalopathy  -Continue to treat underlying metabolic derangements    Delirium precautions   - frequent reorientation  - normal day night cycles (blinds up and awake during day, sleeping at night)  - minimize frequent interruptions, clutter and loud noises  - encourage family visitations  - limit CNS acting medications and sedation as able  - consider melatonin at 1900 nightly    NCC will now sign off.  Please reach out with any further questions.  ASCOM *[phone]        Clinically Significant Risk Factors          # Hypocalcemia: Lowest iCa = 4.1 mg/dL in last 2 days, will monitor and replace as appropriate    # Anion Gap  "Metabolic Acidosis: Highest Anion Gap = 20 mmol/L in last 2 days, will monitor and treat as appropriate  # Hypoalbuminemia: Lowest albumin = 2 g/dL at 6/7/2023  3:30 AM, will monitor as appropriate  # Coagulation Defect: INR = 1.23 (Ref range: 0.85 - 1.15) and/or PTT = 27 Seconds (Ref range: 22 - 38 Seconds), will monitor for bleeding  # Thrombocytopenia: Lowest platelets = 45 in last 2 days, will monitor for bleeding   # Hypertension: Noted on problem list        # Obesity: Estimated body mass index is 37.46 kg/m  as calculated from the following:    Height as of this encounter: 1.702 m (5' 7\").    Weight as of this encounter: 108.5 kg (239 lb 3.2 oz).   # Severe Malnutrition: based on nutrition assessment            The patient was seen and discussed with the [facility] attending, Dr. Dooley.    Kenneth Herbert MD  Neurology Resident , PGY-3  June 13, 2023       24 Hour Vital Signs Summary:  Temp: 97.2  F (36.2  C) Temp  Min: 97.2  F (36.2  C)  Max: 98.1  F (36.7  C)  Resp: 12 Resp  Min: 12  Max: 20  SpO2: 97 % SpO2  Min: 92 %  Max: 100 %  Pulse: 105 Pulse  Min: 97  Max: 130    No data recorded    No data recorded.  No data recorded.      Respiratory monitoring:   Resp: 12      I/O last 3 completed shifts:  In: 1429 [I.V.:794; NG/GT:185]  Out: 1018 [Urine:849; Emesis/NG output:125; Drains:44]    Current Medications:    [Held by provider] aspirin  300 mg Rectal Daily     aspirin  325 mg Oral Daily     B and C vitamin Complex with folic acid  5 mL Oral Daily     heparin ANTICOAGULANT  5,000 Units Subcutaneous Q12H     insulin aspart  1-6 Units Subcutaneous Q4H     [Held by provider] methocarbamol  500 mg Oral 4x Daily     methylPREDNISolone  8 mg Intravenous Q24H     metoprolol  5 mg Intravenous Q4H     [Held by provider] metoprolol tartrate  25 mg Oral Q8H     micafungin  100 mg Intravenous Q24H     mycophenolate  500 mg Oral BID IS     pantoprazole  40 mg Intravenous Daily with breakfast     sodium chloride (PF)  3 " "mL Intravenous Q8H     sulfamethoxazole-trimethoprim  1 tablet Oral Once per day on Mon Wed Fri     tacrolimus  2 mg ORAL OR NJ TUBE BID IS     ursodiol  250 mg Oral BID     valGANciclovir  450 mg Oral Once per day on Mon Thu    Or     valGANciclovir  450 mg Oral or NG Tube Once per day on Mon Thu     vancomycin place issa - receiving intermittent dosing  1 each Intravenous See Admin Instructions       PRN Medications:  albuterol, bisacodyl, dextrose, glucose **OR** dextrose **OR** glucagon, fentaNYL, naloxone **OR** naloxone **OR** naloxone **OR** naloxone, ondansetron **OR** ondansetron, oxyCODONE, phenol MT, polyethylene glycol, prochlorperazine, sennosides, sodium chloride (PF), sodium chloride (PF)    Infusions:    dextrose       norepinephrine Stopped (06/12/23 0110)       No Known Allergies    Physical Examination:  Vitals: BP 90/76 (BP Location: Other (Comment))   Pulse 105   Temp 97.2  F (36.2  C) (Axillary)   Resp 12   Ht 1.702 m (5' 7\")   Wt 108.5 kg (239 lb 3.2 oz)   SpO2 97%   BMI 37.46 kg/m    General: Adult male patient, lying in bed, critically-ill  HEENT: Normocephalic, atraumatic  Cardiac: RRR on monitor  Pulm: unlabored, expansion symmetric, no retractions or use of accessory muscles  Abdomen: Soft  Extremities: edematous in all extremities with weeping of the skin in the upper extremities  Skin: No rash or lesion    Neuro:  Mental status: Awake, alert, not following commands or tracking.  Cranial nerves: Blinks to threat bilaterally, right gaze preference, face symmetric, intermittent resistance to turning pt's head.  Motor/Sensory: Moving all extremities spontaneously and resists passive movements.  Withdraws to noxious stim in all for extremiteis  Coordination: MICHEAL due to mental status  Gait: MICHEAL, deferred.      NIHSS  1a. Level of Consciousness 2-->Not alert, requires repeated stimulation to attend, or is obtunded and requires strong or painful stimulation to make movements (not " stereotyped)   1b. LOC Questions 2-->Answers neither question correctly   1c. LOC Commands 2-->Performs neither task correctly   2.   Best Gaze 1-->Partial gaze palsy, gaze is abnormal in one or both eyes, but forced deviation or total gaze paresis is not present   3.   Visual 0-->No visual loss   4.   Facial Palsy 0-->Normal symmetrical movements   5a. Motor Arm, Left 1-->Drift, limb holds 90 (or 45) degrees, but drifts down before full 10 seconds, does not hit bed or other support   5b. Motor Arm, Right 1-->Drift, limb holds 90 (or 45) degrees, but drifts down before full 10 secs, does not hit bed or other support   6a. Motor Leg, Left 3-->No effort against gravity, leg falls to bed immediately   6b. Motor Leg, right 3-->No effort against gravity, leg falls to bed immediately   7.   Limb Ataxia 0-->Absent   8.   Sensory 0-->Normal, no sensory loss   9.   Best Language 3-->Mute, global aphasia, no usable speech or auditory comprehension   10. Dysarthria 2-->Severe dysarthria, patients speech is so slurred as to be unintelligible in the absence of or out of proportion to any dysphasia, or is mute/anarthric   11. Extinction and Inattention  0-->No abnormality   Total 20 (06/12/23 0633)       Labs and Imaging:    Results for orders placed or performed during the hospital encounter of 06/05/23 (from the past 24 hour(s))   XR Feeding Tube Placement    Narrative    Feeding tube placement: 6/12/2023 3:33 PM    Comparison: CT chest abdomen and pelvis 6/10/2023.    Indication: Malnutrition. Postsurgical transplant 6/6/2023.    Fluoro time: 1.9 minutes.     Technique: After injection of Xylocaine gel into the left nostril, a  feeding tube was advanced under fluoroscopic guidance.    Findings: The feeding tube was advanced with the tip in the first  portion of the duodenum. A small amount of barium was injected to  demonstrate placement within the small bowel. The feeding tube was  then flushed with water. The feeding tube  was secured using nasal  bridle, and was secured at 82 cm at the left nares.      Impression    Impression: Uncomplicated feeding tube placement with tip in the first  portion of the duodenum.    I, ADRIANA BARROS MD, attest that I was immediately available to  provide guidance and assistance during the entirety of the procedure.  The examination was performed portable in the ICU therefore a  radiologist was not directly present during tube placement.    I have personally reviewed the examination and initial interpretation  and I agree with the findings.    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  Stroke alert was   activated due to patient not being able to follow commands and answer   bedside nurse's questions. He also started having right gaze preference.   EEG is being done to evaluate for seizures.     MEDICATIONS:   Robaxin 500mg qid  Cellcept    TECHNICAL SUMMARY: This is a video-EEG monitoring study. EEG was recorded   from 23 scalp electrodes placed according to the 10-20 international   system. Additional electrodes were utilized for referencing, artifact   detection, and recording from other cerebral regions. Qualified   technicians attached EEG electrodes, set up the  study, monitored and   reviewed EEG recordings, and disconnected EEG electrodes when appropriate.   Video was continuously recorded. Video was reviewed for clinical   correlation and to assist with EEG interpretations.     BACKGROUND ACTIVITIES: During this EEG recording, there is moderate to   severe generalized slowing of the background activities throughout the   recording with mixed theta and delta activities. No well defined posterior   dominant rhythm was observed. Sleep stages were recorded with poorly   formed sleep architectures. Hyperventilation and photic stimulation were   not performed.    EEG is reactive to external stimuli.     INTERICTAL ACTIVITIES: No epileptiform activities were observed during   this recording.     ICTAL ACTIVITIES: There are no clinical or electrographic seizures during   this recording.     Summary of Day # 1 of VEEG Monitoring:  This is an abnormal EEG due to the   presence of moderate to severe generalized slowing of the background   activities. Findings are consistent with moderate to severe diffuse   encephalopathy of which the etiology is non-specific.  No epileptiform   activities or seizures were observed.      Thomas Batres MD  EPILEPSY STAFF    Glucose by meter   Result Value Ref Range    GLUCOSE BY METER POCT 106 (H) 70 - 99 mg/dL   CBC with platelets differential    Narrative    The following orders were created for panel order CBC with platelets differential.  Procedure                               Abnormality         Status                     ---------                               -----------         ------                     CBC with platelets and d...[[id number]]  Abnormal            Final result               Manual Differential[[id number]]          Abnormal            Final result                 Please view results for these tests on the individual orders.   INR   Result Value Ref Range    INR 1.23 (H) 0.85 - 1.15   Basic metabolic panel   Result Value Ref Range     Sodium 137 136 - 145 mmol/L    Potassium 5.1 3.4 - 5.3 mmol/L    Chloride 100 98 - 107 mmol/L    Carbon Dioxide (CO2) 17 (L) 22 - 29 mmol/L    Anion Gap 20 (H) 7 - 15 mmol/L    Urea Nitrogen 84.7 (H) 8.0 - 23.0 mg/dL    Creatinine 4.77 (H) 0.67 - 1.17 mg/dL    Calcium 8.0 (L) 8.8 - 10.2 mg/dL    Glucose 114 (H) 70 - 99 mg/dL    GFR Estimate 13 (L) >60 mL/min/1.73m2   Magnesium   Result Value Ref Range    Magnesium 2.9 (H) 1.7 - 2.3 mg/dL   Phosphorus   Result Value Ref Range    Phosphorus 8.7 (H) 2.5 - 4.5 mg/dL   Ionized Calcium   Result Value Ref Range    Calcium Ionized 4.2 (L) 4.4 - 5.2 mg/dL   Hepatic panel   Result Value Ref Range    Protein Total 4.9 (L) 6.4 - 8.3 g/dL    Albumin 2.4 (L) 3.5 - 5.2 g/dL    Bilirubin Total 2.3 (H) <=1.2 mg/dL    Alkaline Phosphatase 138 (H) 40 - 129 U/L    AST 61 (H) 10 - 50 U/L     (H) 10 - 50 U/L    Bilirubin Direct 1.96 (H) 0.00 - 0.30 mg/dL   Lactic acid whole blood   Result Value Ref Range    Lactic Acid 0.7 0.7 - 2.0 mmol/L   Partial thromboplastin time   Result Value Ref Range    aPTT 27 22 - 38 Seconds   Fibrinogen activity   Result Value Ref Range    Fibrinogen Activity 727 (H) 170 - 490 mg/dL   CBC with platelets and differential   Result Value Ref Range    WBC Count 8.6 4.0 - 11.0 10e3/uL    RBC Count 2.61 (L) 4.40 - 5.90 10e6/uL    Hemoglobin 8.0 (L) 13.3 - 17.7 g/dL    Hematocrit 24.3 (L) 40.0 - 53.0 %    MCV 93 78 - 100 fL    MCH 30.7 26.5 - 33.0 pg    MCHC 32.9 31.5 - 36.5 g/dL    RDW 17.3 (H) 10.0 - 15.0 %    Platelet Count 55 (L) 150 - 450 10e3/uL   Glucose by meter   Result Value Ref Range    GLUCOSE BY METER POCT 105 (H) 70 - 99 mg/dL   Manual Differential   Result Value Ref Range    % Neutrophils 97 %    % Lymphocytes 0 %    % Monocytes 2 %    % Eosinophils 0 %    % Basophils 0 %    % Myelocytes 1 %    Absolute Neutrophils 8.3 1.6 - 8.3 10e3/uL    Absolute Lymphocytes 0.0 (L) 0.8 - 5.3 10e3/uL    Absolute Monocytes 0.2 0.0 - 1.3 10e3/uL    Absolute  Eosinophils 0.0 0.0 - 0.7 10e3/uL    Absolute Basophils 0.0 0.0 - 0.2 10e3/uL    Absolute Myelocytes 0.1 (H) <=0.0 10e3/uL    RBC Morphology Confirmed RBC Indices     Platelet Assessment  Automated Count Confirmed. Platelet morphology is normal.     Automated Count Confirmed. Platelet morphology is normal.    Polychromasia Slight (A) None Seen   Tacrolimus by Tandem Mass Spectrometry   Result Value Ref Range    Tacrolimus by Tandem Mass Spectrometry 9.5 5.0 - 15.0 ug/L    Tacrolimus Last Dose Date      Tacrolimus Last Dose Time      Narrative    This test was developed and its performance characteristics determined by the [facility],  Special [facility] Laboratory. It has not been cleared or approved by the FDA. The laboratory is regulated under CLIA as qualified to perform high-complexity testing. This test is used for clinical purposes. It should not be regarded as investigational or for research.   Glucose by meter   Result Value Ref Range    GLUCOSE BY METER POCT 92 70 - 99 mg/dL   Ionized Calcium   Result Value Ref Range    Calcium Ionized 4.3 (L) 4.4 - 5.2 mg/dL   Glucose by meter   Result Value Ref Range    GLUCOSE BY METER POCT 131 (H) 70 - 99 mg/dL       All relevant imaging and laboratory values personally reviewed.

## 2023-06-13 NOTE — PROGRESS NOTES
"[facility]   Transplant Nephrology Progress Note  Date of Admission:  6/5/2023  Today's Date: 06/13/2023    Recommendations:  - no absolute RRT indications today; continued uptrend in Cr, remains diuretic responsive. Will continue to allow for a longer line holiday as possible in the setting of bacteremia-cleared as of 6/10   will re-evaluate for RRT tomorrow, if needed recommend a temporary HD line.  - start Ca acetate 667 po qac  - weekly CMV pcr while valcyte on hold  - if remains off bactrim and still unable to give later this week , consider alternatives  \"pentamidine\"    Assessment & Plan   # DDKT: DGF diuretic responsive, no RRT indications   - Baseline Creatinine: ~ TBD   - Proteinuria: Not checked post transplant   - Date DSA Last Checked: Jun/2023      Latest DSA: Low level DSA (<1000 mfi) to CW9,    - BK Viremia: Not checked post transplant   - Kidney Tx Biopsy: No    -Pt was receiving CRRT from 6/6-6/10, stopped and LIJ tunneled line removed due to bacteremia.    -No acute indication for RRT today.off pressors 6/12.   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 TBili increasing. Slow to decrease LFTs 2/2 hypotension    # Immunosuppression: Tacrolimus immediate release (goal 5-8), Mycophenolate mofetil (dose 500 mg every 12 hours) and Prednisone (dose taper) to 10 mg daily    - Induction with Recent Transplant:  rATG total 4mg/kg, stopped due to sepsis   - Changes: Yes - agree with decreasing MMF in setting of bacteremia. Tac initially held 2/2 DGF per liver transplant team, started 6/9 PM. On prednisone 10 mg prior to Tx now on SM 8 mg iv qd    # Infection Prophylaxis:   - PJP: Sulfa/TMP (Bactrim), holding with ileus   - CMV: Valganciclovir (Valcyte), holding with ileus, CMV IgG Ab positive  - Thrush: Micafungin (Mycamine)  - Fungal: Micafungin (Mycamine)    # History of C.diff:   -Consider PO " vanc with abx, last cdiff screen neg    # E.Faecium bacteremia:   -Diagnosed on 6/9 BlCx. Pending sensitivities. Obtain cultures daily until clearance   -Broad spectrum abx (linezolid, vanc, zosyn x48h) , transplant ID consulted, appreciate recommendations   -Removed tunneled lines and all other lines on 6/10   -BCx NGTD 6/11 -If cultures don't clear consider ASHLEY. TTE w/ poor windows was negative on 6/10    # ANCA vasculitis:   -S/p Rituximab x2   -Obtain intermittent U/A     # Paroxysmal A-Fib: On coumadin and metoprolol ER 25 mg daily PTA. Consider switching to eliquis when kidney and liver are functioning.    -Agree with IV metoprolol 5mg q4h     # Hypotension: Hypotensive, off pressors 6/12;  Goal BP: MAP > 65   - Volume status: Total body volume up, but intravascularly hypovolemic  EDW ~ 96.4 kg (on HD)   - Changes: Not at this time.    # Confusion:   - aphasia+confusions: code stroke 6/11, CTH unremarkable, MRI brain pending   - neurology following    # Elevated Blood Glucose: Glucose generally running ~ 160s-200   - Management as per primary team.  On insulin gtt.    # Anemia in Chronic Renal Disease and acute blood loss: Hgb: Trend down      FEDERICO: No   - Iron studies: Unknown at this time, but checked with dialysis    -Transfuse for Hb<7    # Mineral Bone Disorder:   - Secondary renal hyperparathyroidism; PTH level: Minimally elevated ( pg/ml)        On treatment: None  - Vitamin D; level: Not checked recently, but was normal last check        On supplement: No  - Calcium; level: Normal when corrected for low alb       On supplement: No  - Phosphorus; level: High     On binder: No    # Ileus:   -NG placed 6/10    # Electrolytes:   - Potassium; level: High normal         On supplement: No  - Magnesium; level: High          On supplement: No  - Bicarbonate; level: Low normal         On supplement: No  - Sodium; level: Normal     # Gout:    - On prednisone 10 mg PO daily PTA. Eventual plan to wean  prednisone down to 5mg daily and then will consider cortisol stimulation test    # Transplant History:  Etiology of Kidney Failure: IgA nephropathy and ANCA vasculitis  Tx: Liver Tx (SLK)  Transplant: 2023 (Liver), 2023 (Kidney)  Significant changes in immunosuppression: None  Significant transplant-related complications: None    Recommendations were communicated to the primary team verbally       Darrel Patino MD   Pager: [phone]    Interval History   Remains off pressors, more alert but still aphasic, UOP up to 602/24 with bumex+diuril    Review of Systems   4 point ROS was obtained and negative except as noted in the Interval History.    MEDICATIONS:    aspirin  325 mg Oral Daily     B and C vitamin Complex with folic acid  5 mL Oral Daily     heparin ANTICOAGULANT  5,000 Units Subcutaneous Q12H     insulin aspart  1-6 Units Subcutaneous Q4H     metoprolol tartrate  25 mg Oral BID     micafungin  100 mg Intravenous Q24H     mycophenolate  500 mg Oral BID IS     [START ON 2023] pantoprazole  40 mg Per Feeding Tube QAM AC     [START ON 2023] predniSONE  10 mg Per Feeding Tube Daily     protein modular  1 packet Per Feeding Tube TID     sodium chloride (PF)  3 mL Intravenous Q8H     sulfamethoxazole-trimethoprim  1 tablet Oral Once per day on      tacrolimus  1.5 mg ORAL OR NJ TUBE BID IS     ursodiol  250 mg Oral BID     valGANciclovir  450 mg Oral Once per day on     Or     valGANciclovir  450 mg Oral or NG Tube Once per day on      vancomycin place issa - receiving intermittent dosing  1 each Intravenous See Admin Instructions       dextrose         Physical Exam   Temp  Av.9  F (36.6  C)  Min: 97  F (36.1  C)  Max: 99.2  F (37.3  C)  Arterial Line BP  Min: 60/45  Max: 131/80  Arterial Line MAP (mmHg)  Av mmHg  Min: 52 mmHg  Max: 101 mmHg      Pulse  Av.5  Min: 68  Max: 159 Resp  Av.8  Min: 14  Max: 23  FiO2 (%)  Av.3 %  Min: 40 %  Max: 50  "%  SpO2  Av.1 %  Min: 92 %  Max: 100 %    CVP (mmHg): 6 mmHgBP 90/76 (BP Location: Other (Comment))   Pulse 114   Temp 97.2  F (36.2  C) (Axillary)   Resp 14   Ht 1.702 m (5' 7\")   Wt 108.5 kg (239 lb 3.2 oz)   SpO2 94%   BMI 37.46 kg/m      Admit Weight: 102.3 kg (225 lb 8 oz)     GENERAL APPEARANCE: confused  RESP: lungs clear to auscultation - no rales, rhonchi or wheezes  CV: regular rhythm, normal rate, no rub, no murmur  EDEMA: moderate LE edema bilaterally  ABDOMEN: soft, nondistended, nontender, bowel sounds normal  MS: extremities normal - no gross deformities noted, no evidence of inflammation in joints, no muscle tenderness  SKIN: no rash  PSYCH: fatigued  Neuro: awake aphasic, follows commands  DIALYSIS ACCESS:  None    Data   All labs reviewed by me.  CMP  Recent Labs   Lab 23  0737 23  0351 23  2359 23  1150 23  1145 23  0758 23  0354 23  0605 23  0339 06/10/23  0607 06/10/23  0341   NA  --  137  --   --  138  --  136  --  137  --  135*   POTASSIUM  --  5.1  --   --  5.1  --  5.1  --  4.9  --  4.8   CHLORIDE  --  100  --   --  102  --  100  --  102  --  102   CO2  --  17*  --   --  17*  --  19*  --  20*  --  22   ANIONGAP  --  20*  --   --  19*  --  17*  --  15  --  11   GLC 92 105*  114* 106*   < > 110*   < > 108*  113*   < > 120*   < > 128*   BUN  --  84.7*  --   --  71.9*  --  66.0*  --  48.0*  --  31.4*   CR  --  4.77*  --   --  4.16*  --  3.83*  --  2.58*  --  1.42*   GFRESTIMATED  --  13*  --   --  15*  --  17*  --  27*  --  55*   NAZARIO  --  8.0*  --   --  7.8*  --  7.7*  --  7.8*  --  8.0*   MAG  --  2.9*  --   --   --   --  2.7*  --  2.6*  --  2.6*   PHOS  --  8.7*  --   --   --   --  7.7*  --  5.7*  --  5.1*   PROTTOTAL  --  4.9*  --   --   --   --  4.5*  --  4.5*  --  5.0*   ALBUMIN  --  2.4*  --   --   --   --  2.3*  --  2.4*  --  2.8*   BILITOTAL  --  2.3*  --   --   --   --  3.0*  --  4.9*  --  2.1*   ALKPHOS  --  138*  --   " --   --   --  119  --  151*  --  198*   AST  --  61*  --   --   --   --  75*  --  105*  --  186*   ALT  --  158*  --   --   --   --  186*  --  264*  --  450*    < > = values in this interval not displayed.     CBC  Recent Labs   Lab 06/13/23  0351 06/12/23  1145 06/12/23  0354 06/11/23  1650 06/11/23  0339   HGB 8.0* 8.1* 6.6*  --  7.2*   WBC 8.6 8.1 7.7  --  7.2   RBC 2.61* 2.68* 2.16*  --  2.40*   HCT 24.3* 25.1* 20.6*  --  22.6*   MCV 93 94 95  --  94   MCH 30.7 30.2 30.6  --  30.0   MCHC 32.9 32.3 32.0  --  31.9   RDW 17.3* 17.2* 16.6*  --  16.6*   PLT 55* 50* 45* 47* 44*     INR  Recent Labs   Lab 06/13/23  0351 06/12/23  0354 06/11/23  1650 06/11/23  0339   INR 1.23* 1.27* 1.29* 1.30*   PTT 27 30 29 30     ABG  Recent Labs   Lab 06/12/23  0535 06/11/23  1833 06/11/23  1244 06/10/23  1807   PH 7.34* 7.34* 7.34* 7.37   PCO2 38 39 39 42   PO2 176* 128* 124* 107*   HCO3 20* 21 21 24   O2PER 28 2 5 4      Urine Studies  Recent Labs   Lab Test 06/10/23  1613 06/05/23  1620 04/07/23  1246 02/13/23  0743   COLOR Orange* Yellow Yellow Yellow   APPEARANCE Cloudy* Clear Clear Clear   URINEGLC 30* Negative Negative Negative   URINEBILI Negative Negative Negative Negative   URINEKETONE Negative Negative Negative Negative   SG 1.021 1.012 1.011 1.014   UBLD Large* Moderate* Moderate* Negative   URINEPH 5.5 5.5 5.5 5.0   PROTEIN 100* 10* 10* 10*   NITRITE Negative Negative Negative Negative   LEUKEST Large* Negative Negative Negative   RBCU >182* 2 5* 1   WBCU 128* 7* 2 2     No lab results found.  PTH  Recent Labs   Lab Test 05/19/23  0956   PTHI 67*     Iron Studies  Recent Labs   Lab Test 11/22/22  0848   IRON 68   *   IRONSAT 31   HOLA 429*       IMAGING:  All imaging studies reviewed by me.

## 2023-06-13 NOTE — PROGRESS NOTES
[facility]  Transplant Infectious Disease Progress Note     Patient:  Damion Quinones, Date of birth 1955, Medical record number [id number]  Date of Visit:  06/13/2023         Assessment and Recommendations:   Recommendations:  1. Continue vancomycin, pharmacy to assist with dosing. Plan for 10 day course from negative blood culture and line removal (6/11 - 6/20). If the pt were to be discharged prior to 6/20/23, then he can be switched to lineazolid to complete abx therapy.    2. Continue micafungin, valcyte and bactrim per protocol  3. Can replace lines 48-72 hours after negative blood culture    Transplant Infectious Disease will sign off, please don't hesitate to call or re-consult with question.     Assessment:  67 yoM with PMH of hemochromatosis and decompensated alcohol cirrhosis c/b variceal bleeding s/p TIPS (10/1/2022) and hepatic encephalopathy, IgA nephropathy and ANCA vaculitis causing CKD now s/p simultaneous liver and kideny transplant on 6/6/2023 with RTOR on 6/6/2023 with concern for low flow in the renal graft, with healthy appearing graft and intact arterial and venous flow, now with blood culture positive with GPC, enterococcus by verigene (neg Aisha and vanB) but rectal swab pre-transplant with VRE positivity.     #enterococcus faecium bacteremia  Patient with recent simultaneous liver and renal transplant with new onset sepsis and blood culture positive with enterococcus faecium (by verigene). Of note the Aisha and vanB genes (which confer Vancomycin resistance) were negative. 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 For this reason, we will cover with linezolid while awaiting culture susceptibilities. He also may have had an aspiration event and had increasing supplemental oxygen requirements over the weekend. We treated broadly with zosyn and linezolid over the weekend to cover VRE and empiric infections. Work-up with  repeat blood and sputum cultures have been negative and E faecium is S to vancomycin. We stopped linezolid and zosyn yesterday and switched back to vancomycin. Will plan to complete a 10 day course of vancomycin from negative blood cultures which last were on 6/11. Has had central lines removed. Had a limited echo that did not demonstrate any valvular pathology. The subacute infarct seen on MRI is not consistent with an infectious process. Discussed with radiology Thus we will plan a course of antibiotics for enterococcus faecium.      We will recommend stopping pip/tazo and switching from linezolid to vancomycin for E faecium which is S to vancomycin       Other Infectious Disease issues include:  - VRE colonization  - h/o Cdiff      - QTc interval: 429  On 6/10  - Bacterial prophylaxis: vancomycin  - Pneumocystis prophylaxis: tmp/sulfa  - Viral serostatus & prophylaxis: CMV+, EBV +, HSV 1?/2?, vzv ?  - Fungal prophylaxis: micafungin  - Risk factors to suggest check of Toxoplasma, Strongy, or Schisto serology?:  - Gamma globulin status: not recently checked  - Isolation status: Good hand hygiene.     Abdias Eisenberg MD   Pager [phone]    Discussed with Transplant ID attending, Dr. Shields             Interval History:   Up in the chair this morning. Remains non-verbal, able to move all extremities but not following commands.     Transplants:  6/6/2023 (Liver), 6/6/2023 (Kidney), Postoperative day:  7 (Liver), 7 (Kidney).  Coordinator Cindy Clay, Angelita Torres    Review of Systems:  Unable to perform 2/2 mental status         Current Medications & Allergies:       aspirin  325 mg Oral Daily     B and C vitamin Complex with folic acid  5 mL Oral Daily     heparin ANTICOAGULANT  5,000 Units Subcutaneous Q12H     insulin aspart  1-6 Units Subcutaneous Q4H     metoprolol tartrate  25 mg Oral Q8H KARAN     micafungin  100 mg Intravenous Q24H     mycophenolate  500 mg Oral BID IS     [START ON 6/14/2023] pantoprazole   40 mg Per Feeding Tube QAM AC     [START ON 6/14/2023] predniSONE  10 mg Per Feeding Tube Daily     protein modular  1 packet Per Feeding Tube TID     sodium chloride (PF)  3 mL Intravenous Q8H     sulfamethoxazole-trimethoprim  1 tablet Oral Once per day on Mon Wed Fri     tacrolimus  1.5 mg ORAL OR NJ TUBE BID IS     ursodiol  250 mg Oral BID     valGANciclovir  450 mg Oral Once per day on Mon Thu    Or     valGANciclovir  450 mg Oral or NG Tube Once per day on Mon Thu     vancomycin place issa - receiving intermittent dosing  1 each Intravenous See Admin Instructions       Infusions/Drips:    dextrose         No Known Allergies         Physical Exam:     Patient Vitals for the past 24 hrs:   Temp Temp src Pulse Resp SpO2 Weight   06/13/23 1600 97.9  F (36.6  C) Oral (!) 124 14 96 % --   06/13/23 1500 -- -- 105 16 94 % --   06/13/23 1400 -- -- (!) 133 14 97 % --   06/13/23 1300 -- -- (!) 124 14 96 % --   06/13/23 1200 97.3  F (36.3  C) Axillary 114 14 94 % --   06/13/23 1100 -- -- (!) 130 12 91 % --   06/13/23 1000 -- -- 108 12 94 % --   06/13/23 0900 -- -- 108 14 98 % --   06/13/23 0800 97.2  F (36.2  C) Axillary 105 12 97 % --   06/13/23 0700 -- -- 117 -- 98 % --   06/13/23 0600 -- -- (!) 125 -- 98 % --   06/13/23 0500 -- -- 97 -- 97 % --   06/13/23 0400 98.1  F (36.7  C) Axillary (!) 122 16 94 % --   06/13/23 0300 -- -- 107 -- 92 % --   06/13/23 0200 -- -- 98 -- 96 % --   06/13/23 0100 -- -- 117 -- 94 % --   06/13/23 0000 97.7  F (36.5  C) Axillary 119 16 96 % 108.5 kg (239 lb 3.2 oz)   06/12/23 2300 -- -- 111 -- 97 % --   06/12/23 2200 -- -- 112 -- 97 % --   06/12/23 2100 -- -- 114 -- 96 % --   06/12/23 2000 97.5  F (36.4  C) Axillary 105 14 97 % --   06/12/23 1900 -- -- (!) 130 -- 97 % --   06/12/23 1822 -- -- 115 -- 100 % --   06/12/23 1800 -- -- 113 12 100 % --     Ranges for vital signs:  Temp:  [97.2  F (36.2  C)-98.1  F (36.7  C)] 97.9  F (36.6  C)  Pulse:  [] 124  Resp:  [12-16] 14  MAP:  [85  mmHg-108 mmHg] 86 mmHg  Arterial Line BP: (106-138)/(63-90) 110/64  SpO2:  [91 %-100 %] 96 %  Vitals:    06/11/23 0600 06/12/23 0600 06/13/23 0000   Weight: 109.4 kg (241 lb 2.9 oz) 108.6 kg (239 lb 6.7 oz) 108.5 kg (239 lb 3.2 oz)       Physical Examination:  GENERAL:  well-developed, well-nourished man, sitting in chair in no acute distress working with PT.  HEAD:  Head is normocephalic, atraumatic   EYES:  Eyes have anicteric sclerae without conjunctival injection   ENT:  Oropharynx is moist without exudates or ulcers.   NECK:  Supple.   LUNGS:  Clear to auscultation bilateral.   CARDIOVASCULAR:  Regular rate and rhythm with no murmur  ABDOMEN:  Incision with staples, well approximated without erythema or discharge   SKIN:  No acute rashes. PIV Line in place without any surrounding erythema or exudate.  NEUROLOGIC: Confused, non-verbal. Active x4 extremities         Laboratory Data:     No results found for: ACD4    Inflammatory Markers    Recent Labs   Lab Test 01/15/23  1107 12/16/22  1652 11/22/22  0848   SED 11  --   --    CRPI 19.20* 77.70*  --    PSA  --   --  0.69       Immune Globulin Studies   No lab results found.    Metabolic Studies       Recent Labs   Lab Test 06/13/23  1550 06/13/23  1248 06/13/23  0737 06/13/23  0351 06/12/23  0758 06/12/23  0354 06/10/23  0607 06/10/23  0341 06/06/23  2109 06/06/23  2108   NA  --  135*  --  137   < > 136   < > 135*   < > 142   POTASSIUM  --  5.1  --  5.1   < > 5.1   < > 4.8   < > 5.0   CHLORIDE  --  100  --  100   < > 100   < > 102   < > 107   CO2  --  16*  --  17*   < > 19*   < > 22   < > 20*   ANIONGAP  --  19*  --  20*   < > 17*   < > 11   < > 15   BUN  --  93.6*  --  84.7*   < > 66.0*   < > 31.4*   < > 32.6*   CR  --  4.95*  --  4.77*   < > 3.83*   < > 1.42*   < > 2.76*   GFRESTIMATED  --  12*  --  13*   < > 17*   < > 55*   < > 25*   * 131*  137*   < > 105*  114*   < > 108*  113*   < > 128*   < > 195*   A1C  --   --   --   --   --   --   --   --   --   5.1   NAZARIO  --  8.1*  --  8.0*   < > 7.7*   < > 8.0*   < > 8.6*   PHOS  --  8.5*  --  8.7*  --  7.7*   < > 5.1*   < >  --    MAG  --  2.8*  --  2.9*  --  2.7*   < > 2.6*   < >  --    LACT  --   --   --  0.7  --  0.6*   < > 1.3   < >  --    PCAL  --   --   --   --   --   --   --  8.66*  --   --     < > = values in this interval not displayed.       Hepatic Studies    Recent Labs   Lab Test 06/13/23 0351 06/12/23 0354 06/11/23  0339   BILITOTAL 2.3* 3.0* 4.9*   DBIL 1.96* 2.80* 4.78*   ALKPHOS 138* 119 151*   PROTTOTAL 4.9* 4.5* 4.5*   ALBUMIN 2.4* 2.3* 2.4*   AST 61* 75* 105*   * 186* 264*       Pancreatitis testing    Recent Labs   Lab Test 06/07/23  0330 06/05/23  1605 01/23/23  0153 01/15/23  1107 12/16/22  1652 11/22/22  0848   AMYLASE 90 153*  --   --   --   --    LIPASE 59  --  149* 142* 85*  --    TRIG  --   --   --   --   --  96       Gout Labs    No lab results found.    Hematology Studies   Recent Labs   Lab Test 06/13/23 0351 06/12/23  1145 06/12/23  0354 06/11/23  1650 06/11/23  0339 06/09/23  1305 06/09/23  0413   WBC 8.6 8.1 7.7  --  7.2   < > 10.9   ANEU 8.3  --  7.6  --   --    < >  --    ANEUTAUTO  --   --   --   --  6.7  --  10.4*   ALYM 0.0*  --  0.0*  --   --    < >  --    ALYMPAUTO  --   --   --   --  0.0*  --  0.0*   EUSEBIA 0.2  --  0.1  --   --    < >  --    AMONOAUTO  --   --   --   --  0.2  --  0.4   AEOS 0.0  --  0.0  --   --    < >  --    AEOSAUTO  --   --   --   --  0.0  --  0.0   ABSBASO  --   --   --   --  0.0  --  0.0   HGB 8.0* 8.1* 6.6*  --  7.2*   < > 8.1*   HCT 24.3* 25.1* 20.6*  --  22.6*   < > 25.9*   PLT 55* 50* 45*   < > 44*   < > 52*    < > = values in this interval not displayed.       Clotting Studies    Recent Labs   Lab Test 06/13/23  0351 06/12/23  0354 06/11/23  1650 06/11/23  0339   INR 1.23* 1.27* 1.29* 1.30*   PTT 27 30 29 30       Iron Testing    Recent Labs   Lab Test 06/13/23  0351 12/16/22  1652 11/22/22  0848   IRON  --   --  68   FEB  --   --  219*    IRONSAT  --   --  31   HOLA  --   --  429*   MCV 93   < > 93    < > = values in this interval not displayed.       Arterial Blood Gas Testing    Recent Labs   Lab Test 06/12/23  0535 06/11/23  1833 06/11/23  1244 06/10/23  1807 06/10/23  0919 06/07/23  1637   PH 7.34* 7.34* 7.34* 7.37  --  7.43   PCO2 38 39 39 42  --  33*   PO2 176* 128* 124* 107*  --  71*   HCO3 20* 21 21 24  --  22   O2PER 28 2 5 4   < > 21    < > = values in this interval not displayed.        Thyroid Studies     Recent Labs   Lab Test 04/07/23  1150 12/16/22  1652 11/22/22  0848   TSH 3.37 2.86 4.91*   T4  --   --  1.09       Urine Studies     Recent Labs   Lab Test 06/10/23  1613 06/05/23  1620 04/07/23  1246 02/13/23  0743 01/23/23  0828   URINEPH 5.5 5.5 5.5 5.0 6.0   NITRITE Negative Negative Negative Negative Negative   LEUKEST Large* Negative Negative Negative Negative   WBCU 128* 7* 2 2 8*       CSF testing   No lab results found.    Invalid input(s): CADAM, EVPCR, ENTPCR, ENTEROVIRUS    Medication levels    Recent Labs   Lab Test 06/13/23  0600 06/12/23  1145   VANCOMYCIN  --  14.1   TACROL 9.5  --        Body fluid stats  No lab results found.    Microbiology:  Fungal testing  No lab results found.    Invalid input(s): HIFUN, FUNGL    Last Culture results   Culture   Date Value Ref Range Status   06/12/2023 No growth after 1 day  Preliminary   06/12/2023 No growth after 1 day  Preliminary   06/11/2023 No growth after 2 days  Preliminary   06/11/2023 No growth after 2 days  Preliminary   06/10/2023 No Growth  Final   06/09/2023 Positive on the 1st day of incubation (A)  Final   06/09/2023 Enterococcus faecium (AA)  Final     Comment:     1 of 1 bottlesSusceptibilities done on previous cultures   06/09/2023 Positive on the 1st day of incubation (A)  Final   06/09/2023 Enterococcus faecium (AA)  Final     Comment:     2 of 2 bottles   06/05/2023 Enterococcus faecium VRE (A)  Final   05/17/2023 No Growth  Final   04/07/2023 No Growth   Final   04/07/2023 No Growth  Final   02/13/2023 No Growth  Final   02/13/2023 No Growth  Final   01/23/2023 No Growth  Final   01/23/2023 No Growth  Final   12/16/2022 No Growth  Final   12/16/2022 No Growth  Final           Last checks of Clostridioides difficile testing  Recent Labs   Lab Test 06/11/23  1842 04/08/23  2252 01/15/23  1750 12/16/22  1916   CDBPCT Negative Positive* Positive* Positive*   CDIFFGDH  --  Positive* Positive* Positive*   CDIFFTOX  --  Negative Positive* Positive*       Infection Studies to assess Diarrhea   Recent Labs   Lab Test 01/15/23  1750 12/16/22 2028   EPSTX1 Not Detected Not Detected   EPSTX2 Not Detected Not Detected   EPCAMP Not Detected Not Detected   EPSALM Not Detected Not Detected   EPSHGL Not Detected Not Detected   EPVIB Not Detected Not Detected   EPROTA Not Detected Not Detected   EPNORO Not Detected Not Detected   EPYER Not Detected Not Detected       Virology:  Coronavirus-19 testing    Recent Labs   Lab Test 06/05/23  1409 04/07/23  1630 02/13/23  0743 01/23/23  0609 12/16/22  2025 07/29/20  0751   [id number] Negative Negative Negative Negative   < >  --    COVIDPCREXT  --   --   --   --   --  Not Detected    < > = values in this interval not displayed.       Respiratory virus (non-coronavirus-19) testing    Recent Labs   Lab Test 02/13/23  0743   INFZA Negative   INFZB Negative   IRSV Negative       CMV viral loads  No lab results found.    CMV resistance testing  No lab results found.    No results found for: H6RES    No results found for: EBVRESINST, 20774, EBQTC, EBRES, 10585, 61060    BK Virus Testing   No lab results found.    Parvovirus Testing  No lab results found.    Invalid input(s): PRVRES    Adenovirus Testing  No lab results found.    Invalid input(s): ADENAB, ADENOVIRUS, ADQT    Imaging:  Recent Results (from the past 48 hour(s))   CT Head w/o Contrast    Narrative    CT HEAD W/O CONTRAST 6/12/2023 6:57 AM    History: stroke   ICD-10:    Comparison:  4/7/2023    Technique: Using multidetector thin collimation helical acquisition  technique, axial, coronal and sagittal CT images from the skull base  to the vertex were obtained without intravenous contrast.   (topogram) image(s) also obtained and reviewed.    Findings: There is no intracranial hemorrhage, mass effect, or midline  shift. Gray/white matter differentiation in both cerebral hemispheres  is preserved. Ventricles are proportionate to the cerebral sulci. The  basal cisterns are clear. Encephalomalacia in the right gyrus rectus.  Patchy periventricular and subcortical white matter hypodensities,  likely from chronic small vessel ischemic disease.    The bony calvaria and the bones of the skull base are normal. The  visualized portions of the paranasal sinuses and mastoid air cells are  clear.      Impression    Impression:  1. No acute intracranial pathology.   2. 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    I have personally reviewed the examination and initial interpretation  and I agree with the findings.    LINA FITZPATRICK MD         SYSTEM ID:  [id number]   MRA Brain ([place] [facility] [place]) wo Contrast    Narrative    EXAM: Brain MRI without contrast, neck MRA (carotids) without  contrast, brain MRA ([place] [facility] [place]) without contrast 6/12/2023  11:12 AM     HISTORY: New LEFT sided weakness and RIGHT gaze deviation.    COMPARISON: Head CT without contrast 4/7/2023, 1/23/2023, 6/12/2023.    TECHNIQUE:   Brain MRI:  Axial and coronal diffusion, axial FLAIR, axial GRE, and  sagittal T1-weighted images were obtained without intravenous  contrast.  Head MRA: 3D time-of-flight MRA of the [place] [facility] [place] was performed  without intravenous contrast.  Neck MRA:  Limited non contrast 2DTOF images were obtained of the  mid-cervical region.  Three-dimensional reconstructions of the neck and head MRA were  created, which were reviewed by the radiologist..    FINDINGS:    Brain MRI:  There is no mass  effect, midline shift, or intracranial hemorrhage.  The ventricles are proportionate to the cerebral sulci.  Diffusion-weighted images are positive for a punctate focus of signal  in the right centrum semiovale, also visible coronal images. Multiple  scattered foci of subcortical and deep cerebral white matter T2  hyperintensity. Stable chronic encephalomalacia involving the right  gyrus rectus.    No suspicious abnormality of the skull marrow signal. Clear paranasal  sinuses. Mastoid air cells are clear. No focal abnormality of the  pituitary gland, sella, skull base and upper cervical spinal  structures on sagittal images. The orbits are normal.    Head MRA:  The posterior communicating, anterior, right middle, and posterior  cerebral arteries are patent. The left distal M1/M2 vessel and  demonstrates a short segment of stenosis. No stenosis or occlusion of  the arteries in the posterior fossa. No arterial aneurysm. Hypoplastic  left A1. Short segment of high-grade stenosis of the supraclinoid  segment of the left internal carotid artery (series 1 image 79). The  right internal carotid artery is patent.    No acute abnormality of the other imaged intracranial, paranasal  sinus, orbital, and/or skull base structures.    Neck MRA:  Neck MRA demonstrates patent major cervical vasculature. No  significant stenosis. The normal distal right internal carotid artery  measures 5 mm. The normal distal left internal carotid artery measures  5 mm.      Impression    IMPRESSION:  1. Punctate focus of likely acute to subacute infarct in the right  centrum semiovale.  2. Short segment stenosis of the supraclinoid left internal carotid  artery. No occlusion or intracranial arterial aneurysm.  3. Neck MRA demonstrates patent major cervical vasculature without  significant stenosis.    These findings were communicated to Dr. Eran Dooley by Dr. Tanvir Hughes at 6/12/2023 12:13 PM via telephone and understanding  was  verbalized.    I have personally reviewed the examination and initial interpretation  and I agree with the findings.    LINA FITZPATRICK MD         SYSTEM ID:  [id number]   MRA Neck (Carotids) wo Contrast    Narrative    EXAM: Brain MRI without contrast, neck MRA (carotids) without  contrast, brain MRA ([place] [facility] [place]) without contrast 6/12/2023  11:12 AM     HISTORY: New LEFT sided weakness and RIGHT gaze deviation.    COMPARISON: Head CT without contrast 4/7/2023, 1/23/2023, 6/12/2023.    TECHNIQUE:   Brain MRI:  Axial and coronal diffusion, axial FLAIR, axial GRE, and  sagittal T1-weighted images were obtained without intravenous  contrast.  Head MRA: 3D time-of-flight MRA of the [place] [facility] [place] was performed  without intravenous contrast.  Neck MRA:  Limited non contrast 2DTOF images were obtained of the  mid-cervical region.  Three-dimensional reconstructions of the neck and head MRA were  created, which were reviewed by the radiologist..    FINDINGS:    Brain MRI:  There is no mass effect, midline shift, or intracranial hemorrhage.  The ventricles are proportionate to the cerebral sulci.  Diffusion-weighted images are positive for a punctate focus of signal  in the right centrum semiovale, also visible coronal images. Multiple  scattered foci of subcortical and deep cerebral white matter T2  hyperintensity. Stable chronic encephalomalacia involving the right  gyrus rectus.    No suspicious abnormality of the skull marrow signal. Clear paranasal  sinuses. Mastoid air cells are clear. No focal abnormality of the  pituitary gland, sella, skull base and upper cervical spinal  structures on sagittal images. The orbits are normal.    Head MRA:  The posterior communicating, anterior, right middle, and posterior  cerebral arteries are patent. The left distal M1/M2 vessel and  demonstrates a short segment of stenosis. No stenosis or occlusion of  the arteries in the posterior fossa. No arterial aneurysm.  Hypoplastic  left A1. Short segment of high-grade stenosis of the supraclinoid  segment of the left internal carotid artery (series 1 image 79). The  right internal carotid artery is patent.    No acute abnormality of the other imaged intracranial, paranasal  sinus, orbital, and/or skull base structures.    Neck MRA:  Neck MRA demonstrates patent major cervical vasculature. No  significant stenosis. The normal distal right internal carotid artery  measures 5 mm. The normal distal left internal carotid artery measures  5 mm.      Impression    IMPRESSION:  1. Punctate focus of likely acute to subacute infarct in the right  centrum semiovale.  2. Short segment stenosis of the supraclinoid left internal carotid  artery. No occlusion or intracranial arterial aneurysm.  3. Neck MRA demonstrates patent major cervical vasculature without  significant stenosis.    These findings were communicated to Dr. Eran Dooley by Dr. Tanvir Hughes at 6/12/2023 12:13 PM via telephone and understanding was  verbalized.    I have personally reviewed the examination and initial interpretation  and I agree with the findings.    LINA FITZPATRICK MD         SYSTEM ID:  [id number]   MR Brain w/o Contrast    Narrative    EXAM: Brain MRI without contrast, neck MRA (carotids) without  contrast, brain MRA ([place] [facility] [place]) without contrast 6/12/2023  11:12 AM     HISTORY: New LEFT sided weakness and RIGHT gaze deviation.    COMPARISON: Head CT without contrast 4/7/2023, 1/23/2023, 6/12/2023.    TECHNIQUE:   Brain MRI:  Axial and coronal diffusion, axial FLAIR, axial GRE, and  sagittal T1-weighted images were obtained without intravenous  contrast.  Head MRA: 3D time-of-flight MRA of the [place] [facility] [place] was performed  without intravenous contrast.  Neck MRA:  Limited non contrast 2DTOF images were obtained of the  mid-cervical region.  Three-dimensional reconstructions of the neck and head MRA were  created, which were reviewed by the  radiologist..    FINDINGS:    Brain MRI:  There is no mass effect, midline shift, or intracranial hemorrhage.  The ventricles are proportionate to the cerebral sulci.  Diffusion-weighted images are positive for a punctate focus of signal  in the right centrum semiovale, also visible coronal images. Multiple  scattered foci of subcortical and deep cerebral white matter T2  hyperintensity. Stable chronic encephalomalacia involving the right  gyrus rectus.    No suspicious abnormality of the skull marrow signal. Clear paranasal  sinuses. Mastoid air cells are clear. No focal abnormality of the  pituitary gland, sella, skull base and upper cervical spinal  structures on sagittal images. The orbits are normal.    Head MRA:  The posterior communicating, anterior, right middle, and posterior  cerebral arteries are patent. The left distal M1/M2 vessel and  demonstrates a short segment of stenosis. No stenosis or occlusion of  the arteries in the posterior fossa. No arterial aneurysm. Hypoplastic  left A1. Short segment of high-grade stenosis of the supraclinoid  segment of the left internal carotid artery (series 1 image 79). The  right internal carotid artery is patent.    No acute abnormality of the other imaged intracranial, paranasal  sinus, orbital, and/or skull base structures.    Neck MRA:  Neck MRA demonstrates patent major cervical vasculature. No  significant stenosis. The normal distal right internal carotid artery  measures 5 mm. The normal distal left internal carotid artery measures  5 mm.      Impression    IMPRESSION:  1. Punctate focus of likely acute to subacute infarct in the right  centrum semiovale.  2. Short segment stenosis of the supraclinoid left internal carotid  artery. No occlusion or intracranial arterial aneurysm.  3. Neck MRA demonstrates patent major cervical vasculature without  significant stenosis.    These findings were communicated to Dr. Eran Dooley by Dr. Tanvir Hughes at  6/12/2023 12:13 PM via telephone and understanding was  verbalized.    I have personally reviewed the examination and initial interpretation  and I agree with the findings.    LINA FITZPATRICK MD         SYSTEM ID:  [id number]   XR Feeding Tube Placement    Narrative    Feeding tube placement: 6/12/2023 3:33 PM    Comparison: CT chest abdomen and pelvis 6/10/2023.    Indication: Malnutrition. Postsurgical transplant 6/6/2023.    Fluoro time: 1.9 minutes.     Technique: After injection of Xylocaine gel into the left nostril, a  feeding tube was advanced under fluoroscopic guidance.    Findings: The feeding tube was advanced with the tip in the first  portion of the duodenum. A small amount of barium was injected to  demonstrate placement within the small bowel. The feeding tube was  then flushed with water. The feeding tube was secured using nasal  bridle, and was secured at 82 cm at the left nares.      Impression    Impression: Uncomplicated feeding tube placement with tip in the first  portion of the duodenum.    I, ADRIANA BARROS MD, attest that I was immediately available to  provide guidance and assistance during the entirety of the procedure.  The examination was performed portable in the ICU therefore a  radiologist was not directly present during tube placement.    I have personally reviewed the examination and initial interpretation  and I agree with the findings.    ADRIANA BARROS MD         SYSTEM ID:  [id number]

## 2023-06-13 NOTE — PROGRESS NOTES
SURGICAL ICU PROGRESS NOTE  2023      PRIMARY TEAM: Transplant Surgery  PRIMARY PHYSICIAN: Dr. Clifton  REASON FOR CRITICAL CARE ADMISSION: Close hemodynamic monitoring, mechanical ventilation, CRRT  ADMITTING PHYSICIAN: Dr. 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 PMHx also includes coronary artery disease, alcohol overuse, obesity, ESRD on HD -- (IgA nephropathy + ANCA vasculitis), psoriatic arthritis, paroxysmal atrial fibrillation (on warfarin), DVT, recurrent c diff, and HTN.  He is now s/p  donor liver and kidney transplant on 23 with Dr. Clifton.  Intraop:   ml  500 ml Cell saver returned  4 uPRBC  2uPlt  1 uFFP  5L crystalloid  UOP 40cc prior to kidney, 90cc post kidney    PLAN:    Updates today (2023)  - Resume PO meds:   - Transition IV methylprednisone to PO prednisone   - Unhold PO prophylactic bactrim and valgancyclovir    - Transition sublingual tacrolimus to PO   - Speech evaluation today   - Continue line holiday  - Continue chest physiotherapy   - Per transplant nephrology, no plans for dialysis today   - Diuresis plan per transplant nephrology, administered 3 mg Bumex and 500 mg Diuril at 1000   - Recheck electrolytes at 1300   - One time dose of phosphate binder  - One time dose 2 g calcium gluconate at 1000  - Increase TF through NJ tube by 10 ml q4 hours  - Discontinue levophed from MAR  - Keep arterial line for frequent blood draws   - Transition robaxin to PRN     Addendum @1500  - Mental status improving, saturating well on room air, off pressors > 24 hours. Patient appropriate to transfer out of ICU per transplant fellow discussion with SICU fellow    Neurological:  #  Acute post-surgical pain   # Hx Alcohol use disorder, sober since 2016  - Monitor neurological status. Delirium preventions and precautions  - Sedation plan: None indicated  - Pain control: PRN oxycodone 5-10mg q4h, robaxin 500 QID PRN  # Aphasia, right sided gaze preference, inability to follow commands  # Encephalopathy  - Stroke code called ~1271 6/12  - Appreciate neurology evaluation and assistance  - Head CT w/o contrast with no acute intracranial pathology   - MRA head/neck (w/o contrast) with no acute pathology related to presentation  - vEEG showing encephalopathy and negative for epileptiform activity   - Per discussion with patient's wife, symptoms are consistent with prior episodes of encephalopathy with increased ammonia levels (including gaze preference). Ammonia 18 yesterday.   - Neurologic status improving, continue with stimulation and activity     Pulmonary:   # Post operative ventilatory support, resolved  # Acute hypoxic respiratory failure requiring mechanical ventilation, resolved  # Bilateral small pleural effusions  - Successfully extubated 6/7 to NC. 6/10 increased oxygen requirement to 6L oxymask. Was on room air for awhile yesterday. Transitioned to 2L NC early this morning, likely due to in part to NIKOS, continue to closely monitor.  - Aggressive pulmonary hygiene, IS, encourage OOB  - Supplemental O2 as needed to maintain SpO2 > 92%  - Continue chest physiotherapy TID today. Had one session yesterday with some improvement.      Cardiovascular:    #Hx pAfib on PTA warfarin  # Afib with RVR, improving  #Hx CAD  #Hx HTN  #Hypotension, orthostatic  #Shock, likely septic- improving  - ECHO 6/5/23: afib, LVEF 55-60%  - MAP goal 60-85, SBP goal 110-160. 6/10 peripheral levophed started for persistent hypotension, off since 0100 6/12. Will remove from MAR today.   - PTA metoprolol succinate ER 25 daily, 6/7 started metoprolol 12.5 BID 6/7, 6/8 increased to 25mg BID. Increased to 25mg q8h by  transplant team. Restart oral metoprolol 25 mg BID.   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 Transplant aware, monitor for now.    - Post-pyloric feeding tube placed by radiology   - Nutrition consulted and following, appreciate input. Trickle feeds started via PPFT ,  Advance rate by 10ml/hr q4h goal until reach goal of 40 ml/hr.   - Bowel regimen, dulcolax suppository PRN  - PPI (Protonix)  - SLP eval today     Renal/ Fluids/Electrolytes:   #Lactic acidosis, resolved  # Hx ESRD on HD MWF (IgA nephropathy + ANCA vasculitis) now s/p DDKT 23  # Delayed graft function, improving  - Expect some delayed graft function with  donor kidney, gradually increasing urine output over the past few days, responsive to diuretics  - transplant nephrology consulted and following, appreciate expertise  - CRRT discontinued 6/10, dialysis line removed due to positive blood cultures. Per nephrology,  continue to hold off on dialysis today.   - Lux in place, continue for close UOP monitoring  - Diuresis again today per transplant nephrology: Bumex 3mg, diuril 500mg  - Cr 4.77 > 4.16 > 3.83  - Recheck electrolytes at 1300   # hyperphosphatemia  - Phos 8.7 > 7.7 > 5.7  - One time dose phosphate binder today  # hypocalcemia  - ionized Ca 4.2  - One time dose 2 g calcium gluconate  # hypermagnesemia    - Mg 2.9  - Diuresis today     Endocrine:  # Stress hyperglycemia, improving  -Currently on high correction ISS. Goal to keep BG< 180 for optimal wound healing      ID:  # Stress leukocytosis, improving  # Post-transplant ppx per transplant  - WBC 8.6 from 7.7  -Perioperative antibiotics: Zosyn x 48 hrs (completed 6/8), micafungin x 14 days  -Immunosuppression per transplant: thymoglobulin (held), 2 mg oral tacrolimus, oral prednisone taper   -Prophylactic regimen: Valganciclovir, Bactrim Ppx (unhold today, resuming PO meds)  # Enterococcus bacteremia  # Septic shock, resolved  - 6/9 blood cultures x2 growing E. Faecium. 6/10, 6/11, 6/12 blood cx NGTD.   - Transplant ID consulted and following, appreciate recs  - Abx: linezolid and Zosyn stopped (610-6/12). Vancomycin started 6/12  - Daily blood cultures until clear. Stop daily blood cultures 6/13  - Continue line holiday today    Heme:     # Acute blood loss anemia   # Anemia of critical illness and chronic renal disease  # Hx hereditary hemochromatosis   # Thrombocytopenia 2/2 liver dysfunction  - Transfusion goals: INR <2, Platelets > 20, Fibrinogen > 200, Hgb >8.0  - Hgb 8.0, Plt 55, INR 1.23    Rheumatologic:  #Hx Gout  - Hold PTA prednisone 10mg daily  - Transitioning methylprednisolone 8 mg taper back to prednisone 10 mg 6/13 per transplant    Musculoskeletal:  # Weakness and deconditioning of critical illness   - Physical and occupational therapy consult, appreciate recommendations. Encourage increased time OOB when mental status appropriate.      Skin:  -Diligent skin care to prevent injury    General Cares/Prophylaxis:    DVT Prophylaxis: SQH q12, SCDs  GI Prophylaxis: Protonix   Restraints: Restraints for medical healing needed: Mitts needed while altered mental status due to pulling at lines.     Lines/ tubes/ drains:  - PIVs  - Lux catheter  - Intraabdominal MAE drain x1   - Axillary arterial line  - NJ     Disposition:  - Surgical ICU    Discussed with SICU staff on multidisciplinary team rounds    Leatha Solano, MS4     Resident Attestation   I was present with the medical student who participated in the service and in the documentation of the note. I have verified the history and personally performed the physical exam and medical decision making. I agree with the assessment and plan of care as documented in the note and have edited where appropriate.      Lindsay Peña MD  General Surgery PGY-2  Date of Service: 06/13/2023    - - - - - - - - - - - - - - - - - - - - - - - - - - - - - - - - - - - - - - - - - - - - - -   INTERVAL EVENTS/SUBJECTIVE:   No acute events overnight. Denies shortness of breath. Denies abdominal pain and nausea. States he feels better than yesterday.     PHYSICAL EXAMINATION:  Temp:  [97.5  F (36.4  C)-98.6  F (37  C)] 98.1  F (36.7  C)  Pulse:  [] 125  Resp:  [10-20] 16  MAP:  [76 mmHg-108 mmHg] 100 mmHg  Arterial Line BP: ()/(62-90) 130/79  SpO2:  [92 %-100 %] 98 %     General: NAD, appears comfortable  HEENT: NG and NJ secure in place. Jaundice appearance, scleral icterus- decreased.   Pulm/Resp: Breathing unlabored on 2L NC, equal chest rise, no audible wheezing. Coarse breath sounds bilaterally on expiration, decreased compared to previous days.   CV: irregularly irregular rhythm on monitor, normal rate  Abdomen: distended, chevron incision without drainage, RLQ prevena holding suctoin, MAE x 1 to bulb suction with serosanguinous bile tinged output, soft reducible umbilical hernia. Bowel sounds present. No  "pain with palpation.   : stauffer catheter in place with scant clear yellow urine output  MSK/Extremities: peripheral edema R>L, peripheral pulses intact, SCDs in place. Right foot normal temperature, left foot cold.  Skin: scattered purpura throughout face, chest, abdomen, and arms, jaundiced- decreased, no erythema or streaking   Neuro: Awake and alert, not oriented to person, place, or time. responds appropriately with head nodding, \"I don't know\" ,\"good morning\", \"yes\", and \"no\". Does not follow commands. Unable to actively raise arms on own. Eyes do not follow to the left but track to the right    LABS: Reviewed.   Arterial Blood Gases   Recent Labs   Lab 06/12/23  0535 06/11/23  1833 06/11/23  1244 06/10/23  1807   PH 7.34* 7.34* 7.34* 7.37   PCO2 38 39 39 42   PO2 176* 128* 124* 107*   HCO3 20* 21 21 24     Complete Blood Count   Recent Labs   Lab 06/13/23  0351 06/12/23  1145 06/12/23  0354 06/11/23  1650 06/11/23  0339   WBC 8.6 8.1 7.7  --  7.2   HGB 8.0* 8.1* 6.6*  --  7.2*   PLT 55* 50* 45* 47* 44*     Basic Metabolic Panel  Recent Labs   Lab 06/13/23  0351 06/12/23  2359 06/12/23  1951 06/12/23  1150 06/12/23  1145 06/12/23  0758 06/12/23  0354 06/11/23  0605 06/11/23  0339     --   --   --  138  --  136  --  137   POTASSIUM 5.1  --   --   --  5.1  --  5.1  --  4.9   CHLORIDE 100  --   --   --  102  --  100  --  102   CO2 17*  --   --   --  17*  --  19*  --  20*   BUN 84.7*  --   --   --  71.9*  --  66.0*  --  48.0*   CR 4.77*  --   --   --  4.16*  --  3.83*  --  2.58*   *  114* 106* 124*   < > 110*   < > 108*  113*   < > 120*    < > = values in this interval not displayed.     Liver Function Tests  Recent Labs   Lab 06/13/23  0351 06/12/23  0354 06/11/23  1650 06/11/23  0339 06/10/23  1807 06/10/23  0341   AST 61* 75*  --  105*  --  186*   * 186*  --  264*  --  450*   ALKPHOS 138* 119  --  151*  --  198*   BILITOTAL 2.3* 3.0*  --  4.9*  --  2.1*   ALBUMIN 2.4* 2.3*  --  2.4*  --  " 2.8*   INR 1.23* 1.27* 1.29* 1.30*   < > 1.24*    < > = values in this interval not displayed.     Pancreatic Enzymes  Recent Labs   Lab 06/07/23  0330   LIPASE 59   AMYLASE 90     Coagulation Profile  Recent Labs   Lab 06/13/23  0351 06/12/23  0354 06/11/23  1650 06/11/23  0339   INR 1.23* 1.27* 1.29* 1.30*   PTT 27 30 29 30       IMAGING:  Recent Results (from the past 24 hour(s))   CT Head w/o Contrast    Narrative    CT HEAD W/O CONTRAST 6/12/2023 6:57 AM    History: stroke   ICD-10:    Comparison: 4/7/2023    Technique: Using multidetector thin collimation helical acquisition  technique, axial, coronal and sagittal CT images from the skull base  to the vertex were obtained without intravenous contrast.   (topogram) image(s) also obtained and reviewed.    Findings: There is no intracranial hemorrhage, mass effect, or midline  shift. Gray/white matter differentiation in both cerebral hemispheres  is preserved. Ventricles are proportionate to the cerebral sulci. The  basal cisterns are clear. Encephalomalacia in the right gyrus rectus.  Patchy periventricular and subcortical white matter hypodensities,  likely from chronic small vessel ischemic disease.    The bony calvaria and the bones of the skull base are normal. The  visualized portions of the paranasal sinuses and mastoid air cells are  clear.      Impression    Impression:  1. No acute intracranial pathology.   2. 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    I have personally reviewed the examination and initial interpretation  and I agree with the findings.    LINA FITZPATRICK MD         SYSTEM ID:  [id number]   MRA Brain ([place]) wo Contrast    Narrative    EXAM: Brain MRI without contrast, neck MRA (carotids) without  contrast, brain MRA ([place]) without contrast 6/12/2023  11:12 AM     HISTORY: New LEFT sided weakness and RIGHT gaze deviation.    COMPARISON: Head CT without contrast 4/7/2023, 1/23/2023,  6/12/2023.    TECHNIQUE:   Brain MRI:  Axial and coronal diffusion, axial FLAIR, axial GRE, and  sagittal T1-weighted images were obtained without intravenous  contrast.  Head MRA: 3D time-of-flight MRA of the [place] was performed  without intravenous contrast.  Neck MRA:  Limited non contrast 2DTOF images were obtained of the  mid-cervical region.  Three-dimensional reconstructions of the neck and head MRA were  created, which were reviewed by the radiologist..    FINDINGS:    Brain MRI:  There is no mass effect, midline shift, or intracranial hemorrhage.  The ventricles are proportionate to the cerebral sulci.  Diffusion-weighted images are positive for a punctate focus of signal  in the right centrum semiovale, also visible coronal images. Multiple  scattered foci of subcortical and deep cerebral white matter T2  hyperintensity. Stable chronic encephalomalacia involving the right  gyrus rectus.    No suspicious abnormality of the skull marrow signal. Clear paranasal  sinuses. Mastoid air cells are clear. No focal abnormality of the  pituitary gland, sella, skull base and upper cervical spinal  structures on sagittal images. The orbits are normal.    Head MRA:  The posterior communicating, anterior, right middle, and posterior  cerebral arteries are patent. The left distal M1/M2 vessel and  demonstrates a short segment of stenosis. No stenosis or occlusion of  the arteries in the posterior fossa. No arterial aneurysm. Hypoplastic  left A1. Short segment of high-grade stenosis of the supraclinoid  segment of the left internal carotid artery (series 1 image 79). The  right internal carotid artery is patent.    No acute abnormality of the other imaged intracranial, paranasal  sinus, orbital, and/or skull base structures.    Neck MRA:  Neck MRA demonstrates patent major cervical vasculature. No  significant stenosis. The normal distal right internal carotid artery  measures 5 mm. The normal distal left  internal carotid artery measures  5 mm.      Impression    IMPRESSION:  1. Punctate focus of likely acute to subacute infarct in the right  centrum semiovale.  2. Short segment stenosis of the supraclinoid left internal carotid  artery. No occlusion or intracranial arterial aneurysm.  3. Neck MRA demonstrates patent major cervical vasculature without  significant stenosis.    These findings were communicated to Dr. Eran Dooley by Dr. Tanvir Hughes at 6/12/2023 12:13 PM via telephone and understanding was  verbalized.    I have personally reviewed the examination and initial interpretation  and I agree with the findings.    LINA FITZPATRICK MD         SYSTEM ID:  [id number]   MRA Neck (Carotids) wo Contrast    Narrative    EXAM: Brain MRI without contrast, neck MRA (carotids) without  contrast, brain MRA ([place]) without contrast 6/12/2023  11:12 AM     HISTORY: New LEFT sided weakness and RIGHT gaze deviation.    COMPARISON: Head CT without contrast 4/7/2023, 1/23/2023, 6/12/2023.    TECHNIQUE:   Brain MRI:  Axial and coronal diffusion, axial FLAIR, axial GRE, and  sagittal T1-weighted images were obtained without intravenous  contrast.  Head MRA: 3D time-of-flight MRA of the [place] was performed  without intravenous contrast.  Neck MRA:  Limited non contrast 2DTOF images were obtained of the  mid-cervical region.  Three-dimensional reconstructions of the neck and head MRA were  created, which were reviewed by the radiologist..    FINDINGS:    Brain MRI:  There is no mass effect, midline shift, or intracranial hemorrhage.  The ventricles are proportionate to the cerebral sulci.  Diffusion-weighted images are positive for a punctate focus of signal  in the right centrum semiovale, also visible coronal images. Multiple  scattered foci of subcortical and deep cerebral white matter T2  hyperintensity. Stable chronic encephalomalacia involving the right  gyrus rectus.    No suspicious abnormality  of the skull marrow signal. Clear paranasal  sinuses. Mastoid air cells are clear. No focal abnormality of the  pituitary gland, sella, skull base and upper cervical spinal  structures on sagittal images. The orbits are normal.    Head MRA:  The posterior communicating, anterior, right middle, and posterior  cerebral arteries are patent. The left distal M1/M2 vessel and  demonstrates a short segment of stenosis. No stenosis or occlusion of  the arteries in the posterior fossa. No arterial aneurysm. Hypoplastic  left A1. Short segment of high-grade stenosis of the supraclinoid  segment of the left internal carotid artery (series 1 image 79). The  right internal carotid artery is patent.    No acute abnormality of the other imaged intracranial, paranasal  sinus, orbital, and/or skull base structures.    Neck MRA:  Neck MRA demonstrates patent major cervical vasculature. No  significant stenosis. The normal distal right internal carotid artery  measures 5 mm. The normal distal left internal carotid artery measures  5 mm.      Impression    IMPRESSION:  1. Punctate focus of likely acute to subacute infarct in the right  centrum semiovale.  2. Short segment stenosis of the supraclinoid left internal carotid  artery. No occlusion or intracranial arterial aneurysm.  3. Neck MRA demonstrates patent major cervical vasculature without  significant stenosis.    These findings were communicated to Dr. Eran Dooley by Dr. Tanvir Hughes at 6/12/2023 12:13 PM via telephone and understanding was  verbalized.    I have personally reviewed the examination and initial interpretation  and I agree with the findings.    LINA FITZPATRICK MD         SYSTEM ID:  [id number]   MR Brain w/o Contrast    Narrative    EXAM: Brain MRI without contrast, neck MRA (carotids) without  contrast, brain MRA ([place]) without contrast 6/12/2023  11:12 AM     HISTORY: New LEFT sided weakness and RIGHT gaze deviation.    COMPARISON: Head CT  without contrast 4/7/2023, 1/23/2023, 6/12/2023.    TECHNIQUE:   Brain MRI:  Axial and coronal diffusion, axial FLAIR, axial GRE, and  sagittal T1-weighted images were obtained without intravenous  contrast.  Head MRA: 3D time-of-flight MRA of the [place] was performed  without intravenous contrast.  Neck MRA:  Limited non contrast 2DTOF images were obtained of the  mid-cervical region.  Three-dimensional reconstructions of the neck and head MRA were  created, which were reviewed by the radiologist..    FINDINGS:    Brain MRI:  There is no mass effect, midline shift, or intracranial hemorrhage.  The ventricles are proportionate to the cerebral sulci.  Diffusion-weighted images are positive for a punctate focus of signal  in the right centrum semiovale, also visible coronal images. Multiple  scattered foci of subcortical and deep cerebral white matter T2  hyperintensity. Stable chronic encephalomalacia involving the right  gyrus rectus.    No suspicious abnormality of the skull marrow signal. Clear paranasal  sinuses. Mastoid air cells are clear. No focal abnormality of the  pituitary gland, sella, skull base and upper cervical spinal  structures on sagittal images. The orbits are normal.    Head MRA:  The posterior communicating, anterior, right middle, and posterior  cerebral arteries are patent. The left distal M1/M2 vessel and  demonstrates a short segment of stenosis. No stenosis or occlusion of  the arteries in the posterior fossa. No arterial aneurysm. Hypoplastic  left A1. Short segment of high-grade stenosis of the supraclinoid  segment of the left internal carotid artery (series 1 image 79). The  right internal carotid artery is patent.    No acute abnormality of the other imaged intracranial, paranasal  sinus, orbital, and/or skull base structures.    Neck MRA:  Neck MRA demonstrates patent major cervical vasculature. No  significant stenosis. The normal distal right internal carotid  artery  measures 5 mm. The normal distal left internal carotid artery measures  5 mm.      Impression    IMPRESSION:  1. Punctate focus of likely acute to subacute infarct in the right  centrum semiovale.  2. Short segment stenosis of the supraclinoid left internal carotid  artery. No occlusion or intracranial arterial aneurysm.  3. Neck MRA demonstrates patent major cervical vasculature without  significant stenosis.    These findings were communicated to Dr. Eran Dooley by Dr. Tanvir Hughes at 6/12/2023 12:13 PM via telephone and understanding was  verbalized.    I have personally reviewed the examination and initial interpretation  and I agree with the findings.    LINA FITZPATRICK MD         SYSTEM ID:  [id number]   XR Feeding Tube Placement    Narrative    Feeding tube placement: 6/12/2023 3:33 PM    Comparison: CT chest abdomen and pelvis 6/10/2023.    Indication: Malnutrition. Postsurgical transplant 6/6/2023.    Fluoro time: 1.9 minutes.     Technique: After injection of Xylocaine gel into the left nostril, a  feeding tube was advanced under fluoroscopic guidance.    Findings: The feeding tube was advanced with the tip in the first  portion of the duodenum. A small amount of barium was injected to  demonstrate placement within the small bowel. The feeding tube was  then flushed with water. The feeding tube was secured using nasal  bridle, and was secured at 82 cm at the left nares.      Impression    Impression: Uncomplicated feeding tube placement with tip in the first  portion of the duodenum.    I, ADRIANA BARROS MD, attest that I was immediately available to  provide guidance and assistance during the entirety of the procedure.  The examination was performed portable in the ICU therefore a  radiologist was not directly present during tube placement.    I have personally reviewed the examination and initial interpretation  and I agree with the findings.    ADRIANA BARROS MD         SYSTEM ID:  [id number]

## 2023-06-13 NOTE — PROGRESS NOTES
D/I: Patient on unit 4A Surgical/Neuro ICU   Neuro- Alert, confused. Intermittently will follow commands. Right gaze preference continues. Pupils equal and reactive. Will answer some yes/no questions. EEG in place.   CV- A fib. 's for a rate. Normotensive. Afebrile.  Pulm- RA while awake. 2L while asleep. Refused oral suctions. NT suction once, small amount of blood tinged secretions out. Coarse lung sounds.  GI- NPO. NJT tube in place. Trickle feeds.  Loose BM x2 overnight.  - Stauffer, adequate output. 40-70mL/hr  Gtts- None.  Skin- See flowsheets  Pain- Denies pain when asked.  Lines and drains- PIV x3, R brachial A line, MAE stauffer (total output=21mL for shift)  See flow sheets for further interventions and assessments.   A: Stable   P: Continue to monitor pt closely. Notify MD of significant changes

## 2023-06-14 ENCOUNTER — APPOINTMENT (OUTPATIENT)
Dept: OCCUPATIONAL THERAPY | Facility: CLINIC | Age: 68
End: 2023-06-14
Attending: INTERNAL MEDICINE
Payer: COMMERCIAL

## 2023-06-14 ENCOUNTER — APPOINTMENT (OUTPATIENT)
Dept: ULTRASOUND IMAGING | Facility: CLINIC | Age: 68
End: 2023-06-14
Attending: NURSE PRACTITIONER
Payer: COMMERCIAL

## 2023-06-14 ENCOUNTER — APPOINTMENT (OUTPATIENT)
Dept: INTERVENTIONAL RADIOLOGY/VASCULAR | Facility: CLINIC | Age: 68
End: 2023-06-14
Attending: NURSE PRACTITIONER
Payer: COMMERCIAL

## 2023-06-14 LAB
ALBUMIN SERPL BCG-MCNC: 2.2 G/DL (ref 3.5–5.2)
ALBUMIN SERPL BCG-MCNC: 2.3 G/DL (ref 3.5–5.2)
ALP SERPL-CCNC: 206 U/L (ref 40–129)
ALT SERPL W P-5'-P-CCNC: 148 U/L (ref 0–70)
ANION GAP SERPL CALCULATED.3IONS-SCNC: 21 MMOL/L (ref 7–15)
ANION GAP SERPL CALCULATED.3IONS-SCNC: 21 MMOL/L (ref 7–15)
APTT PPP: 26 SECONDS (ref 22–38)
APTT PPP: 30 SECONDS (ref 22–38)
AST SERPL W P-5'-P-CCNC: 66 U/L (ref 0–45)
BASOPHILS # BLD MANUAL: 0 10E3/UL (ref 0–0.2)
BASOPHILS NFR BLD MANUAL: 0 %
BILIRUB DIRECT SERPL-MCNC: 1.83 MG/DL (ref 0–0.3)
BILIRUB SERPL-MCNC: 2.1 MG/DL
BUN SERPL-MCNC: 111 MG/DL (ref 8–23)
BUN SERPL-MCNC: 96.9 MG/DL (ref 8–23)
BURR CELLS BLD QL SMEAR: SLIGHT
CA-I BLD-MCNC: 4.2 MG/DL (ref 4.4–5.2)
CA-I BLD-MCNC: 4.3 MG/DL (ref 4.4–5.2)
CALCIUM SERPL-MCNC: 8 MG/DL (ref 8.8–10.2)
CALCIUM SERPL-MCNC: 8.4 MG/DL (ref 8.8–10.2)
CHLORIDE SERPL-SCNC: 100 MMOL/L (ref 98–107)
CHLORIDE SERPL-SCNC: 98 MMOL/L (ref 98–107)
CREAT SERPL-MCNC: 4.51 MG/DL (ref 0.67–1.17)
CREAT SERPL-MCNC: 5.53 MG/DL (ref 0.67–1.17)
DEPRECATED HCO3 PLAS-SCNC: 16 MMOL/L (ref 22–29)
DEPRECATED HCO3 PLAS-SCNC: 16 MMOL/L (ref 22–29)
EOSINOPHIL # BLD MANUAL: 0.1 10E3/UL (ref 0–0.7)
EOSINOPHIL NFR BLD MANUAL: 1 %
ERYTHROCYTE [DISTWIDTH] IN BLOOD BY AUTOMATED COUNT: 17.5 % (ref 10–15)
FIBRINOGEN PPP-MCNC: 734 MG/DL (ref 170–490)
GFR SERPL CREATININE-BSD FRML MDRD: 11 ML/MIN/1.73M2
GFR SERPL CREATININE-BSD FRML MDRD: 14 ML/MIN/1.73M2
GLUCOSE BLDC GLUCOMTR-MCNC: 108 MG/DL (ref 70–99)
GLUCOSE BLDC GLUCOMTR-MCNC: 114 MG/DL (ref 70–99)
GLUCOSE BLDC GLUCOMTR-MCNC: 116 MG/DL (ref 70–99)
GLUCOSE BLDC GLUCOMTR-MCNC: 117 MG/DL (ref 70–99)
GLUCOSE BLDC GLUCOMTR-MCNC: 121 MG/DL (ref 70–99)
GLUCOSE BLDC GLUCOMTR-MCNC: 137 MG/DL (ref 70–99)
GLUCOSE SERPL-MCNC: 130 MG/DL (ref 70–99)
GLUCOSE SERPL-MCNC: 132 MG/DL (ref 70–99)
HCT VFR BLD AUTO: 25.5 % (ref 40–53)
HGB BLD-MCNC: 8.2 G/DL (ref 13.3–17.7)
INR PPP: 1.06 (ref 0.85–1.15)
LYMPHOCYTES # BLD MANUAL: 0 10E3/UL (ref 0.8–5.3)
LYMPHOCYTES NFR BLD MANUAL: 0 %
MAGNESIUM SERPL-MCNC: 2.6 MG/DL (ref 1.7–2.3)
MAGNESIUM SERPL-MCNC: 2.9 MG/DL (ref 1.7–2.3)
MCH RBC QN AUTO: 29.9 PG (ref 26.5–33)
MCHC RBC AUTO-ENTMCNC: 32.2 G/DL (ref 31.5–36.5)
MCV RBC AUTO: 93 FL (ref 78–100)
MONOCYTES # BLD MANUAL: 0.2 10E3/UL (ref 0–1.3)
MONOCYTES NFR BLD MANUAL: 2 %
NEUTROPHILS # BLD MANUAL: 7.4 10E3/UL (ref 1.6–8.3)
NEUTROPHILS NFR BLD MANUAL: 97 %
NRBC # BLD AUTO: 0.2 10E3/UL
NRBC BLD MANUAL-RTO: 2 %
PHOSPHATE SERPL-MCNC: 5.5 MG/DL (ref 2.5–4.5)
PHOSPHATE SERPL-MCNC: 8.2 MG/DL (ref 2.5–4.5)
PLAT MORPH BLD: ABNORMAL
PLATELET # BLD AUTO: 89 10E3/UL (ref 150–450)
POLYCHROMASIA BLD QL SMEAR: SLIGHT
POTASSIUM SERPL-SCNC: 4.2 MMOL/L (ref 3.4–5.3)
POTASSIUM SERPL-SCNC: 4.6 MMOL/L (ref 3.4–5.3)
PROT SERPL-MCNC: 5 G/DL (ref 6.4–8.3)
RBC # BLD AUTO: 2.74 10E6/UL (ref 4.4–5.9)
RBC MORPH BLD: ABNORMAL
SODIUM SERPL-SCNC: 135 MMOL/L (ref 136–145)
SODIUM SERPL-SCNC: 137 MMOL/L (ref 136–145)
TACROLIMUS BLD-MCNC: 7.6 UG/L (ref 5–15)
TARGETS BLD QL SMEAR: SLIGHT
TME LAST DOSE: NORMAL H
TME LAST DOSE: NORMAL H
UFH PPP CHRO-ACNC: <0.1 IU/ML
VANCOMYCIN SERPL-MCNC: 20.3 UG/ML
WBC # BLD AUTO: 7.6 10E3/UL (ref 4–11)

## 2023-06-14 PROCEDURE — 250N000013 HC RX MED GY IP 250 OP 250 PS 637: Performed by: NURSE PRACTITIONER

## 2023-06-14 PROCEDURE — 99233 SBSQ HOSP IP/OBS HIGH 50: CPT | Mod: 24 | Performed by: INTERNAL MEDICINE

## 2023-06-14 PROCEDURE — 85730 THROMBOPLASTIN TIME PARTIAL: CPT | Performed by: PHYSICIAN ASSISTANT

## 2023-06-14 PROCEDURE — 80197 ASSAY OF TACROLIMUS: CPT | Performed by: NURSE PRACTITIONER

## 2023-06-14 PROCEDURE — 85007 BL SMEAR W/DIFF WBC COUNT: CPT | Performed by: SURGERY

## 2023-06-14 PROCEDURE — 250N000012 HC RX MED GY IP 250 OP 636 PS 637: Performed by: PHYSICIAN ASSISTANT

## 2023-06-14 PROCEDURE — C1752 CATH,HEMODIALYSIS,SHORT-TERM: HCPCS

## 2023-06-14 PROCEDURE — 85520 HEPARIN ASSAY: CPT

## 2023-06-14 PROCEDURE — 82248 BILIRUBIN DIRECT: CPT | Performed by: SURGERY

## 2023-06-14 PROCEDURE — 85027 COMPLETE CBC AUTOMATED: CPT | Performed by: SURGERY

## 2023-06-14 PROCEDURE — 36556 INSERT NON-TUNNEL CV CATH: CPT | Mod: GC | Performed by: RADIOLOGY

## 2023-06-14 PROCEDURE — 250N000013 HC RX MED GY IP 250 OP 250 PS 637: Performed by: INTERNAL MEDICINE

## 2023-06-14 PROCEDURE — 250N000012 HC RX MED GY IP 250 OP 636 PS 637: Performed by: TRANSPLANT SURGERY

## 2023-06-14 PROCEDURE — 76937 US GUIDE VASCULAR ACCESS: CPT | Mod: 26 | Performed by: RADIOLOGY

## 2023-06-14 PROCEDURE — 250N000013 HC RX MED GY IP 250 OP 250 PS 637: Performed by: SURGERY

## 2023-06-14 PROCEDURE — 272N000504 HC NEEDLE CR4

## 2023-06-14 PROCEDURE — 258N000003 HC RX IP 258 OP 636: Performed by: TRANSPLANT SURGERY

## 2023-06-14 PROCEDURE — 83735 ASSAY OF MAGNESIUM: CPT | Performed by: INTERNAL MEDICINE

## 2023-06-14 PROCEDURE — 36556 INSERT NON-TUNNEL CV CATH: CPT

## 2023-06-14 PROCEDURE — 250N000013 HC RX MED GY IP 250 OP 250 PS 637: Performed by: TRANSPLANT SURGERY

## 2023-06-14 PROCEDURE — 76776 US EXAM K TRANSPL W/DOPPLER: CPT | Mod: 26 | Performed by: RADIOLOGY

## 2023-06-14 PROCEDURE — 85730 THROMBOPLASTIN TIME PARTIAL: CPT

## 2023-06-14 PROCEDURE — 250N000011 HC RX IP 250 OP 636: Performed by: TRANSPLANT SURGERY

## 2023-06-14 PROCEDURE — 250N000009 HC RX 250

## 2023-06-14 PROCEDURE — 999N000015 HC STATISTIC ARTERIAL MONITORING DAILY

## 2023-06-14 PROCEDURE — 250N000013 HC RX MED GY IP 250 OP 250 PS 637: Performed by: PHYSICIAN ASSISTANT

## 2023-06-14 PROCEDURE — 84100 ASSAY OF PHOSPHORUS: CPT | Performed by: SURGERY

## 2023-06-14 PROCEDURE — 99291 CRITICAL CARE FIRST HOUR: CPT | Performed by: STUDENT IN AN ORGANIZED HEALTH CARE EDUCATION/TRAINING PROGRAM

## 2023-06-14 PROCEDURE — 77001 FLUOROGUIDE FOR VEIN DEVICE: CPT | Mod: 26 | Performed by: RADIOLOGY

## 2023-06-14 PROCEDURE — 82330 ASSAY OF CALCIUM: CPT | Performed by: INTERNAL MEDICINE

## 2023-06-14 PROCEDURE — 82310 ASSAY OF CALCIUM: CPT | Performed by: SURGERY

## 2023-06-14 PROCEDURE — 250N000009 HC RX 250: Performed by: RADIOLOGY

## 2023-06-14 PROCEDURE — 250N000011 HC RX IP 250 OP 636: Performed by: STUDENT IN AN ORGANIZED HEALTH CARE EDUCATION/TRAINING PROGRAM

## 2023-06-14 PROCEDURE — 85384 FIBRINOGEN ACTIVITY: CPT | Performed by: PHYSICIAN ASSISTANT

## 2023-06-14 PROCEDURE — 99152 MOD SED SAME PHYS/QHP 5/>YRS: CPT | Mod: GC | Performed by: RADIOLOGY

## 2023-06-14 PROCEDURE — 258N000003 HC RX IP 258 OP 636: Performed by: SURGERY

## 2023-06-14 PROCEDURE — 83735 ASSAY OF MAGNESIUM: CPT | Performed by: SURGERY

## 2023-06-14 PROCEDURE — 250N000011 HC RX IP 250 OP 636: Performed by: RADIOLOGY

## 2023-06-14 PROCEDURE — 250N000009 HC RX 250: Performed by: INTERNAL MEDICINE

## 2023-06-14 PROCEDURE — 76776 US EXAM K TRANSPL W/DOPPLER: CPT

## 2023-06-14 PROCEDURE — 250N000011 HC RX IP 250 OP 636: Performed by: SURGERY

## 2023-06-14 PROCEDURE — 84100 ASSAY OF PHOSPHORUS: CPT | Performed by: INTERNAL MEDICINE

## 2023-06-14 PROCEDURE — 999N000248 HC STATISTIC IV INSERT WITH US BY RN

## 2023-06-14 PROCEDURE — 82330 ASSAY OF CALCIUM: CPT | Performed by: SURGERY

## 2023-06-14 PROCEDURE — 80202 ASSAY OF VANCOMYCIN: CPT | Performed by: TRANSPLANT SURGERY

## 2023-06-14 PROCEDURE — 200N000002 HC R&B ICU UMMC

## 2023-06-14 PROCEDURE — 250N000011 HC RX IP 250 OP 636: Performed by: INTERNAL MEDICINE

## 2023-06-14 PROCEDURE — C1769 GUIDE WIRE: HCPCS

## 2023-06-14 PROCEDURE — 250N000012 HC RX MED GY IP 250 OP 636 PS 637: Performed by: INTERNAL MEDICINE

## 2023-06-14 PROCEDURE — 250N000009 HC RX 250: Performed by: TRANSPLANT SURGERY

## 2023-06-14 PROCEDURE — 85610 PROTHROMBIN TIME: CPT | Performed by: SURGERY

## 2023-06-14 PROCEDURE — 97535 SELF CARE MNGMENT TRAINING: CPT | Mod: GO

## 2023-06-14 PROCEDURE — 02HV33Z INSERTION OF INFUSION DEVICE INTO SUPERIOR VENA CAVA, PERCUTANEOUS APPROACH: ICD-10-PCS | Performed by: RADIOLOGY

## 2023-06-14 PROCEDURE — 250N000011 HC RX IP 250 OP 636

## 2023-06-14 RX ORDER — NOREPINEPHRINE BITARTRATE 0.06 MG/ML
.01-.6 INJECTION, SOLUTION INTRAVENOUS CONTINUOUS
Status: DISCONTINUED | OUTPATIENT
Start: 2023-06-14 | End: 2023-06-14

## 2023-06-14 RX ORDER — CALCIUM ACETATE 667 MG/1
667 CAPSULE ORAL EVERY 6 HOURS
Status: DISCONTINUED | OUTPATIENT
Start: 2023-06-14 | End: 2023-06-14

## 2023-06-14 RX ORDER — VALGANCICLOVIR HYDROCHLORIDE 50 MG/ML
450 POWDER, FOR SOLUTION ORAL DAILY
Status: DISCONTINUED | OUTPATIENT
Start: 2023-06-14 | End: 2023-06-19

## 2023-06-14 RX ORDER — CALCIUM ACETATE 667 MG/1
667 CAPSULE ORAL
Status: DISCONTINUED | OUTPATIENT
Start: 2023-06-14 | End: 2023-06-14

## 2023-06-14 RX ORDER — VALGANCICLOVIR 450 MG/1
450 TABLET, FILM COATED ORAL
Status: DISCONTINUED | OUTPATIENT
Start: 2023-06-16 | End: 2023-06-14

## 2023-06-14 RX ORDER — CALCIUM CHLORIDE, MAGNESIUM CHLORIDE, DEXTROSE MONOHYDRATE, LACTIC ACID, SODIUM CHLORIDE, SODIUM BICARBONATE AND POTASSIUM CHLORIDE 5.15; 2.03; 22; 5.4; 6.46; 3.09; .157 G/L; G/L; G/L; G/L; G/L; G/L; G/L
12.5 INJECTION INTRAVENOUS CONTINUOUS
Status: DISCONTINUED | OUTPATIENT
Start: 2023-06-14 | End: 2023-06-15

## 2023-06-14 RX ORDER — HEPARIN SODIUM 10000 [USP'U]/100ML
1800 INJECTION, SOLUTION INTRAVENOUS CONTINUOUS
Status: DISCONTINUED | OUTPATIENT
Start: 2023-06-14 | End: 2023-06-18

## 2023-06-14 RX ORDER — METOPROLOL TARTRATE 1 MG/ML
5 INJECTION, SOLUTION INTRAVENOUS EVERY 4 HOURS PRN
Status: DISCONTINUED | OUTPATIENT
Start: 2023-06-14 | End: 2023-06-25 | Stop reason: HOSPADM

## 2023-06-14 RX ORDER — CALCIUM ACETATE 667 MG/1
667 CAPSULE ORAL EVERY 6 HOURS
Status: DISCONTINUED | OUTPATIENT
Start: 2023-06-14 | End: 2023-06-21

## 2023-06-14 RX ORDER — SODIUM BICARBONATE 650 MG/1
650 TABLET ORAL 3 TIMES DAILY
Status: DISCONTINUED | OUTPATIENT
Start: 2023-06-14 | End: 2023-06-23

## 2023-06-14 RX ORDER — HEPARIN SODIUM 1000 [USP'U]/ML
3 INJECTION, SOLUTION INTRAVENOUS; SUBCUTANEOUS ONCE
Status: COMPLETED | OUTPATIENT
Start: 2023-06-14 | End: 2023-06-14

## 2023-06-14 RX ORDER — CALCIUM GLUCONATE 20 MG/ML
2 INJECTION, SOLUTION INTRAVENOUS EVERY 8 HOURS PRN
Status: DISCONTINUED | OUTPATIENT
Start: 2023-06-14 | End: 2023-06-19

## 2023-06-14 RX ORDER — SULFAMETHOXAZOLE AND TRIMETHOPRIM 400; 80 MG/1; MG/1
1 TABLET ORAL
Status: DISCONTINUED | OUTPATIENT
Start: 2023-06-14 | End: 2023-06-25 | Stop reason: HOSPADM

## 2023-06-14 RX ORDER — POTASSIUM CHLORIDE 29.8 MG/ML
20 INJECTION INTRAVENOUS EVERY 8 HOURS PRN
Status: DISCONTINUED | OUTPATIENT
Start: 2023-06-14 | End: 2023-06-19

## 2023-06-14 RX ORDER — MIDODRINE HYDROCHLORIDE 5 MG/1
5 TABLET ORAL
Status: DISCONTINUED | OUTPATIENT
Start: 2023-06-14 | End: 2023-06-15

## 2023-06-14 RX ORDER — LIDOCAINE HYDROCHLORIDE 10 MG/ML
1-30 INJECTION, SOLUTION EPIDURAL; INFILTRATION; INTRACAUDAL; PERINEURAL
Status: COMPLETED | OUTPATIENT
Start: 2023-06-14 | End: 2023-06-14

## 2023-06-14 RX ORDER — VANCOMYCIN HYDROCHLORIDE 1 G/200ML
1000 INJECTION, SOLUTION INTRAVENOUS ONCE
Status: DISCONTINUED | OUTPATIENT
Start: 2023-06-14 | End: 2023-06-14

## 2023-06-14 RX ORDER — VALGANCICLOVIR 450 MG/1
450 TABLET, FILM COATED ORAL DAILY
Status: DISCONTINUED | OUTPATIENT
Start: 2023-06-14 | End: 2023-06-19

## 2023-06-14 RX ORDER — ACETYLCYSTEINE 100 MG/ML
4 SOLUTION ORAL; RESPIRATORY (INHALATION) EVERY 4 HOURS PRN
Status: DISCONTINUED | OUTPATIENT
Start: 2023-06-14 | End: 2023-06-16

## 2023-06-14 RX ORDER — MAGNESIUM SULFATE HEPTAHYDRATE 40 MG/ML
2 INJECTION, SOLUTION INTRAVENOUS EVERY 8 HOURS PRN
Status: DISCONTINUED | OUTPATIENT
Start: 2023-06-14 | End: 2023-06-19

## 2023-06-14 RX ORDER — HEPARIN SODIUM (PORCINE) LOCK FLUSH IV SOLN 100 UNIT/ML 100 UNIT/ML
3 SOLUTION INTRAVENOUS
Status: DISCONTINUED | OUTPATIENT
Start: 2023-06-14 | End: 2023-06-25 | Stop reason: HOSPADM

## 2023-06-14 RX ORDER — VALGANCICLOVIR HYDROCHLORIDE 50 MG/ML
450 POWDER, FOR SOLUTION ORAL
Status: DISCONTINUED | OUTPATIENT
Start: 2023-06-16 | End: 2023-06-14

## 2023-06-14 RX ORDER — CALCIUM CHLORIDE, MAGNESIUM CHLORIDE, SODIUM CHLORIDE, SODIUM BICARBONATE, POTASSIUM CHLORIDE AND SODIUM PHOSPHATE DIBASIC DIHYDRATE 3.68; 3.05; 6.34; 3.09; .314; .187 G/L; G/L; G/L; G/L; G/L; G/L
12.5 INJECTION INTRAVENOUS CONTINUOUS
Status: DISCONTINUED | OUTPATIENT
Start: 2023-06-14 | End: 2023-06-15

## 2023-06-14 RX ORDER — MYCOPHENOLATE MOFETIL 200 MG/ML
750 POWDER, FOR SUSPENSION ORAL
Status: DISCONTINUED | OUTPATIENT
Start: 2023-06-14 | End: 2023-06-23

## 2023-06-14 RX ORDER — HEPARIN SODIUM (PORCINE) LOCK FLUSH IV SOLN 100 UNIT/ML 100 UNIT/ML
3 SOLUTION INTRAVENOUS EVERY 24 HOURS
Status: DISCONTINUED | OUTPATIENT
Start: 2023-06-14 | End: 2023-06-25 | Stop reason: HOSPADM

## 2023-06-14 RX ORDER — CALCIUM CHLORIDE, MAGNESIUM CHLORIDE, DEXTROSE MONOHYDRATE, LACTIC ACID, SODIUM CHLORIDE, SODIUM BICARBONATE AND POTASSIUM CHLORIDE 5.15; 2.03; 22; 5.4; 6.46; 3.09; .157 G/L; G/L; G/L; G/L; G/L; G/L; G/L
INJECTION INTRAVENOUS CONTINUOUS
Status: DISCONTINUED | OUTPATIENT
Start: 2023-06-14 | End: 2023-06-15

## 2023-06-14 RX ADMIN — SULFAMETHOXAZOLE AND TRIMETHOPRIM 1 TABLET: 400; 80 TABLET ORAL at 20:38

## 2023-06-14 RX ADMIN — CALCIUM ACETATE 667 MG: 667 CAPSULE ORAL at 11:49

## 2023-06-14 RX ADMIN — CALCIUM ACETATE 667 MG: 667 CAPSULE ORAL at 17:59

## 2023-06-14 RX ADMIN — CALCIUM CHLORIDE, MAGNESIUM CHLORIDE, DEXTROSE MONOHYDRATE, LACTIC ACID, SODIUM CHLORIDE, SODIUM BICARBONATE AND POTASSIUM CHLORIDE 12.5 ML/KG/HR: 5.15; 2.03; 22; 5.4; 6.46; 3.09; .157 INJECTION INTRAVENOUS at 20:47

## 2023-06-14 RX ADMIN — MIDODRINE HYDROCHLORIDE 5 MG: 5 TABLET ORAL at 17:59

## 2023-06-14 RX ADMIN — METOPROLOL TARTRATE 5 MG: 5 INJECTION INTRAVENOUS at 07:41

## 2023-06-14 RX ADMIN — TACROLIMUS 1.5 MG: 5 CAPSULE ORAL at 07:57

## 2023-06-14 RX ADMIN — HEPARIN SODIUM 600 UNITS/HR: 10000 INJECTION, SOLUTION INTRAVENOUS at 16:26

## 2023-06-14 RX ADMIN — URSODIOL 250 MG: 250 TABLET, FILM COATED ORAL at 20:10

## 2023-06-14 RX ADMIN — MYCOPHENOLATE MOFETIL 750 MG: 200 POWDER, FOR SUSPENSION ORAL at 18:00

## 2023-06-14 RX ADMIN — VALGANCICLOVIR HYDROCHLORIDE 450 MG: 450 TABLET ORAL at 20:10

## 2023-06-14 RX ADMIN — SODIUM BICARBONATE 650 MG: 650 TABLET ORAL at 20:10

## 2023-06-14 RX ADMIN — CALCIUM GLUCONATE 2 G: 20 INJECTION, SOLUTION INTRAVENOUS at 23:31

## 2023-06-14 RX ADMIN — SODIUM BICARBONATE 650 MG: 650 TABLET ORAL at 14:05

## 2023-06-14 RX ADMIN — CALCIUM CHLORIDE, MAGNESIUM CHLORIDE, DEXTROSE MONOHYDRATE, LACTIC ACID, SODIUM CHLORIDE, SODIUM BICARBONATE AND POTASSIUM CHLORIDE 12.5 ML/KG/HR: 5.15; 2.03; 22; 5.4; 6.46; 3.09; .157 INJECTION INTRAVENOUS at 16:26

## 2023-06-14 RX ADMIN — METOPROLOL TARTRATE 25 MG: 25 TABLET, FILM COATED ORAL at 06:34

## 2023-06-14 RX ADMIN — CALCIUM CHLORIDE, MAGNESIUM CHLORIDE, SODIUM CHLORIDE, SODIUM BICARBONATE, POTASSIUM CHLORIDE AND SODIUM PHOSPHATE DIBASIC DIHYDRATE 12.5 ML/KG/HR: 3.68; 3.05; 6.34; 3.09; .314; .187 INJECTION INTRAVENOUS at 16:26

## 2023-06-14 RX ADMIN — MICAFUNGIN SODIUM 100 MG: 50 INJECTION, POWDER, LYOPHILIZED, FOR SOLUTION INTRAVENOUS at 01:00

## 2023-06-14 RX ADMIN — MYCOPHENOLATE MOFETIL 500 MG: 200 POWDER, FOR SUSPENSION ORAL at 07:56

## 2023-06-14 RX ADMIN — Medication 5 ML: at 07:56

## 2023-06-14 RX ADMIN — METOPROLOL TARTRATE 5 MG: 5 INJECTION INTRAVENOUS at 01:00

## 2023-06-14 RX ADMIN — VANCOMYCIN HYDROCHLORIDE 1500 MG: 10 INJECTION, POWDER, LYOPHILIZED, FOR SOLUTION INTRAVENOUS at 20:39

## 2023-06-14 RX ADMIN — CALCIUM CHLORIDE, MAGNESIUM CHLORIDE, DEXTROSE MONOHYDRATE, LACTIC ACID, SODIUM CHLORIDE, SODIUM BICARBONATE AND POTASSIUM CHLORIDE: 5.15; 2.03; 22; 5.4; 6.46; 3.09; .157 INJECTION INTRAVENOUS at 16:25

## 2023-06-14 RX ADMIN — HEPARIN SODIUM 5000 UNITS: 5000 INJECTION, SOLUTION INTRAVENOUS; SUBCUTANEOUS at 07:56

## 2023-06-14 RX ADMIN — MIDODRINE HYDROCHLORIDE 5 MG: 5 TABLET ORAL at 11:49

## 2023-06-14 RX ADMIN — TACROLIMUS 1.5 MG: 5 CAPSULE ORAL at 17:59

## 2023-06-14 RX ADMIN — LIDOCAINE HYDROCHLORIDE 2 ML: 10 INJECTION, SOLUTION EPIDURAL; INFILTRATION; INTRACAUDAL; PERINEURAL at 15:03

## 2023-06-14 RX ADMIN — HEPARIN SODIUM 1500 UNITS: 1000 INJECTION INTRAVENOUS; SUBCUTANEOUS at 15:36

## 2023-06-14 RX ADMIN — ASPIRIN 325 MG: 325 TABLET ORAL at 07:56

## 2023-06-14 RX ADMIN — SODIUM BICARBONATE 650 MG: 650 TABLET ORAL at 08:26

## 2023-06-14 RX ADMIN — URSODIOL 250 MG: 250 TABLET, FILM COATED ORAL at 07:56

## 2023-06-14 RX ADMIN — PREDNISONE 10 MG: 10 TABLET ORAL at 07:56

## 2023-06-14 RX ADMIN — CALCIUM CHLORIDE, MAGNESIUM CHLORIDE, SODIUM CHLORIDE, SODIUM BICARBONATE, POTASSIUM CHLORIDE AND SODIUM PHOSPHATE DIBASIC DIHYDRATE 12.5 ML/KG/HR: 3.68; 3.05; 6.34; 3.09; .314; .187 INJECTION INTRAVENOUS at 20:45

## 2023-06-14 RX ADMIN — Medication 40 MG: at 07:57

## 2023-06-14 RX ADMIN — NOREPINEPHRINE BITARTRATE 0.03 MCG/KG/MIN: 1 INJECTION, SOLUTION, CONCENTRATE INTRAVENOUS at 20:10

## 2023-06-14 ASSESSMENT — ACTIVITIES OF DAILY LIVING (ADL)
ADLS_ACUITY_SCORE: 38
ADLS_ACUITY_SCORE: 38
ADLS_ACUITY_SCORE: 40
ADLS_ACUITY_SCORE: 38
ADLS_ACUITY_SCORE: 40
ADLS_ACUITY_SCORE: 38
ADLS_ACUITY_SCORE: 38
ADLS_ACUITY_SCORE: 40
ADLS_ACUITY_SCORE: 38

## 2023-06-14 ASSESSMENT — VISUAL ACUITY
OU: OTHER (SEE COMMENT)
OU: OTHER (SEE COMMENT)

## 2023-06-14 NOTE — PROGRESS NOTES
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 RTOR on  with concern for low flow in the renal graft - ex-lap, washout, closure with healthy appearing graft with intact arterial and venous flow.      s/p DBD simultaneous liver kidney transplant on 2023 with complex reconstruction of accessory right hepatic artery. POD #8  * RTOR on  with concern for low flow in the renal graft - ex-lap, washout, closure with healthy appearing graft with intact arterial and venous flow.     Liver studies downtrending/stable. Liver US w/doppler 2023 - low waveforms and low resistive indices, but stable velocities and upstrokes.   Stent: No biliary stent in place. Ursodiol 250mg BID.  Drains: 1 MAE drain in place; output in the last 24 hours: 55cc   - ASA 325mg daily      H/o CKD related IgA nephropathy and ANCA vasculitis s/p  donor renal transplant on 2023 with ureteral/J stent placement, delayed graft function  Nephrology consulted. Serum creatinine increased from 4.95 to 5.53 with worsening uremia. UOP in the last 24 hours: 949, but only 10cc since midnight. Kidney US with doppler  with patent vessels  - Renal US with doppler ordered  - IR consult for temporary dialysis line; NPO pending HD line placement  - Plan for dialysis today given worsening renal dysfunction, uremia and encephalopathy  Metabolic acidosis: CO2 16 today. Sodium bicarbonate 650mg TID.   Hyperphosphatemia: HD as above    Immunosuppressed status secondary to medications:   - Thymoglobulin: s/p 1mg/kg on , , , . Hold starting 6/10 due to concern for bacteremia and possible sepsis. No further doses of  thymoglobulin.   - Steroid taper per protocol, completed, now resumed PTA prednisone 10 mg for rheumatoid arthritis (see below). Will plan to taper down to 5 mg Prednisone for chronic use.   - Tacrolimus 1.5mg BID , goal 5-8.  - MMF 750mg BID    Hematology:   Acute on chronic anemia: Hgb stable ~8; last pRBC transition on 6/12/2023 (1u pRBC).   Thrombocytopenia: Likely related to chronic liver disease, slowly improving. 89 this AM.     Cardiac:   Coronary artery disease: Continue ASA 81mg daily.               * Recommend statin resumption in the outpatient setting.      Paroxysmal atrial fibrillation: HR . Cardiology consulted: recommended low dose beta blockade.              * Metoprolol 25 mg q8h + metoprolol 5mg IV PRN for sustained tachycardia > 120               * Plan to start heparin gtt following line placement with aPTT goal of 30-40; Xa goal  0.1-0.2     Hypotension: Baseline hypotension with concern for sepsis with new finding of bacteremia. 6/10 Echo: EF 55-60%. S/p vasopressors (last 6/12/2023)   * Midodrine 5mg TID restarted    Respiratory:  Acute hypoxic respiratory failure in the setting of post-operative state, RESOLVED: Extubated on 6/7, intermittently requiring supplemental nasal canula. Now on ambient air.               * Continue pulmonary toilet and incentive spirometry    GI/Nutrition:   Hepatitis B s Ag +: Noted to be positive pre-transplant on 6/5/23, but not previously positive (1/26/2023). HBV DNA negative on 6/5/23. No clear at risk behavior. Repeat Hep B s Ag was negative and HBV DNA not detected; suspect 6/5/2023 HBsAg positivity was a false positive.      Mild protein calorie malnutrition: Nasal-enteric feeding tube in place  - NPO pending HD line as above; plan to restart TF at 40cc/hr after line placement. Nutrition consulted    Post-operative ileus, RESOLVED: Distended stomach and bowel loops noted on 6/10 AXR. NG placed and now removed. Now good stool output which suggests  against ileus.      Bowel regimen: Senna.     Endocrine:  Post-operative elevated blood glucoses: HgA1c 6.1% (6/6/2023)              * Sliding scale insulin prn     Rheumatology:  Psoriatic arthritis: Prior to admission was on prednisone 10mg daily, on prednisone taper as above.               *Goal to discharge on prednisone 5mg; may consider cortisol stimulation test given long term steroid use    : Lux in place for strict I&Os    Neurologic  Encephalopathy: Altered mental status was first noted 6/11/2023. Neurology consulted. Head CT without evidence of acute intra-cranial pathology. MRI/MRA - small area of diffusion restriction on DWI without correlation on ADC. EEG consistent with severe encephalopathy, but no seizures. Etiology of encephalopathy likely multifactorial: worsening uremia and recent bacteremia (although clearance of bacteremia has not resulted to improvement in mental status). No sedating medications; no recent opioids  - Plan for HD as above, if there is not improvement in her encephalopathy with uremia improvement, will consider immunosuppression adjustment (tacrolimus -> cyclosporine)  - Delirium precautions    Acute post operative pain:    * Methocarbamol 500mg q4h PRN   * Oxycodone 2.5-5mgm q4h PRN     Deconditioning/Weakness: OT and PT optimization     Alcohol use disorder: Continue sober supports post transplant. Continue complete sobriety.    ID  Enterococcus Faecium bacteremia: 6/9 blood cultures positive for enterococcus faecium. No vegetations noted on TTE. Urine culture 6/10/23 without any growth. ID consulted.   * Daily BCx until clear; blood cultures from 6/10, 6/11, 6/12 and 6/13 with no growth to date  * Antibiotics: Vancomycin 6/12-current; s/p linezolid 6/10-6/12. S/p empiric zosyn 6/10-6/12    Prophylaxis: Mechanical DVT ppx (heparin subcutaneous; holding pending line) , fall, GI (PPI BID), fungal (micafungin x 14 days), CMV (valganciclovir, CMV IgG Ab +), PJP  "(bactrim)     Disposition: Ongoing MICU status given plan for CRRT (vs iHD) given hypotension requiring midodrine     GEORGE/Fellow/Resident Provider: Lolis Bermudez MD     Faculty: Charleen Garcia MD  _________________________________________________________________    Interval History:  Overnight events: No acute events overnight. Afebrile.  - Ongoing encephalopathy   - Breathing comfortably on ambient air; some chest congestion noted  - UOP in last 24 hours: 949 ml but only 10cc since AM  - Stool in the last 24 hours: 5x     ROS:   A 10-point review of systems was negative except as noted above.    Meds:    aspirin  325 mg Oral Daily     B and C vitamin Complex with folic acid  5 mL Oral Daily     heparin ANTICOAGULANT  5,000 Units Subcutaneous Q12H     insulin aspart  1-6 Units Subcutaneous Q4H     metoprolol tartrate  25 mg Oral Q8H KARAN     micafungin  100 mg Intravenous Q24H     mycophenolate  500 mg Oral BID IS     pantoprazole  40 mg Per Feeding Tube QAM AC     predniSONE  10 mg Per Feeding Tube Daily     protein modular  1 packet Per Feeding Tube TID     sodium bicarbonate  650 mg Oral or Feeding Tube TID     sodium chloride (PF)  3 mL Intravenous Q8H     sulfamethoxazole-trimethoprim  1 tablet Oral Once per day on Mon Wed Fri     tacrolimus  1.5 mg ORAL OR NJ TUBE BID IS     ursodiol  250 mg Oral BID     valGANciclovir  450 mg Oral Once per day on Mon Thu    Or     valGANciclovir  450 mg Oral or NG Tube Once per day on Mon Thu     vancomycin place issa - receiving intermittent dosing  1 each Intravenous See Admin Instructions       Physical Exam:     Admit Weight: 102.3 kg (225 lb 8 oz)    Current vitals:   BP 90/76 (BP Location: Other (Comment))   Pulse (!) 121   Temp 96.8  F (36  C) (Axillary)   Resp 16   Ht 1.702 m (5' 7\")   Wt 108.3 kg (238 lb 12.1 oz)   SpO2 93%   BMI 37.39 kg/m      Vital sign ranges:    Temp:  [96.3  F (35.7  C)-97.9  F (36.6  C)] 96.8  F (36  C)  Pulse:  [102-137] 121  Resp: "  [11-36] 16  MAP:  [80 mmHg-104 mmHg] 90 mmHg  Arterial Line BP: (106-138)/(63-82) 119/63  SpO2:  [91 %-98 %] 93 %    General Appearance: NAD  Skin: Warm, perfused  Heart: Irregular rhythm, tachycardic   Lungs: expiratory wheeze bilaterally  Abdomen: Obese. Abdomen soft, appropriately tender. Abdominal incision is clean, dry, and intact. MAE drain in place with serosanguinous drainage  : Lux with dark urine  Extremities: 1+ edema in Mima bilaterally.   Neurologic: Intermittently nods yes and no to questions. Intermittently follows simple commands.     Data:   CMP  Recent Labs   Lab 06/14/23  0800 06/14/23  0404 06/14/23  0357 06/13/23  1550 06/13/23  1248 06/13/23  0737 06/13/23  0351   NA  --   --  135*  --  135*  --  137   POTASSIUM  --   --  4.6  --  5.1  --  5.1   CHLORIDE  --   --  98  --  100  --  100   CO2  --   --  16*  --  16*  --  17*   * 116* 130*   < > 131*  137*   < > 105*  114*   BUN  --   --  111.0*  --  93.6*  --  84.7*   CR  --   --  5.53*  --  4.95*  --  4.77*   GFRESTIMATED  --   --  11*  --  12*  --  13*   NAZARIO  --   --  8.4*  --  8.1*  --  8.0*   ICAW  --   --  4.3*  --  4.3*  --  4.2*   MAG  --   --  2.9*  --  2.8*  --  2.9*   PHOS  --   --  8.2*  --  8.5*  --  8.7*   ALBUMIN  --   --  2.3*  --   --   --  2.4*   BILITOTAL  --   --  2.1*  --   --   --  2.3*   ALKPHOS  --   --  206*  --   --   --  138*   AST  --   --  66*  --   --   --  61*   ALT  --   --  148*  --   --   --  158*    < > = values in this interval not displayed.     CBC  Recent Labs   Lab 06/14/23  0357 06/13/23  0351   HGB 8.2* 8.0*   WBC 7.6 8.6   PLT 89* 55*     Renal US w/doppler 6/14/2023  1. Three renal arteries by surgical report. Two were joined to a common patch and there were 2 renal artery anastomoses. Only 1 of the arterial anastomoses was visualized today. No renal transplant arterial or venous stenosis demonstrated.  2. Faster than expected iliac vein velocity above the anastomosis may represent edema  3.  Elevated superior arcuate artery resistive index.  4. Negative grayscale appearance of the renal transplant.

## 2023-06-14 NOTE — PHARMACY-VANCOMYCIN DOSING SERVICE
Pharmacy Vancomycin Note  Date of Service 2023  Patient's  1955   67 year old, male    Indication: Bacteremia  Day of Therapy: 5 (started on 6/10/23)  Current vancomycin regimen:  Intermittent  Current vancomycin monitoring method: Renal Replacement Therapy  Current vancomycin therapeutic monitoring goal: 15-20 mg/L    Current estimated CrCl = Estimated Creatinine Clearance: 15.6 mL/min (A) (based on SCr of 5.53 mg/dL (H)). Patient transitioning to hemodialysis today 23.    Creatinine for last 3 days  2023:  3:54 AM Creatinine 3.83 mg/dL; 11:45 AM Creatinine 4.16 mg/dL  2023:  3:51 AM Creatinine 4.77 mg/dL; 12:48 PM Creatinine 4.95 mg/dL  2023:  3:57 AM Creatinine 5.53 mg/dL    Recent Vancomycin Levels (past 3 days)  2023: 11:45 AM Vancomycin 14.1 ug/mL  2023:  3:57 AM Vancomycin 20.3 ug/mL    Vancomycin IV Administrations (past 72 hours)                   vancomycin (VANCOCIN) 1000 mg in dextrose 5% 200 mL PREMIX (mg) 1,000 mg New Bag 23 1552                Nephrotoxins and other renal medications (From now, onward)    Start     Dose/Rate Route Frequency Ordered Stop    23 1800  tacrolimus (GENERIC EQUIVALENT) suspension 1.5 mg         1.5 mg ORAL OR NJ TUBE 2 TIMES DAILY. 23 1032      23 1431  vancomycin place issa - receiving intermittent dosing         1 each Intravenous SEE ADMIN INSTRUCTIONS 23 1431               Contrast Orders - past 72 hours (72h ago, onward)    Start     Dose/Rate Route Frequency Stop    23 1330  barium sulfate (VARIBAR THIN Liquid) 40 % oral suspension 8 g         20 mL Per Feeding Tube ONCE 23 1434    23 0700  iopamidol (ISOVUE-370) solution 75 mL  Status:  Discontinued         75 mL Intravenous ONCE 23 0649          Interpretation of levels and current regimen:  Vancomycin level is reflective of therapeutic level. Measured level is 20.3. Given patient undergoing HD today, expect level  to be ~14.2 assuming 30% removal with a 3-4 hour HD run.    Has serum creatinine changed greater than 50% in last 72 hours: Yes    Urine output:  diminished urine output    Renal Function: Worsening, undergoing HD    Plan:  1. Plan to re-dose vancomycin 1,000 mg today after dialysis  2. Vancomycin monitoring method: Renal Replacement Therapy  3. Vancomycin therapeutic monitoring goal: Pre-HD levels > 15 mg/L  4. Pharmacy will check vancomycin levels as appropriate in 1-3 Days.  5. Serum creatinine levels will be ordered daily for the first week of therapy and at least twice weekly for subsequent weeks.    Jeremy Haines, PharmD, MPH  [id number] ID Pharmacy Resident  Pager: [phone]

## 2023-06-14 NOTE — PROGRESS NOTES
SURGICAL ICU PROGRESS NOTE  Consult/Transfer Note   2023      PRIMARY TEAM: Transplant Surgery  PRIMARY PHYSICIAN: Dr. Clifton  REASON FOR ICU ADMISSION/CONSULT: Close hemodynamic monitoring, CRRT  ADMITTING PHYSICIAN: Dr. Nowak   Date of Service (when I saw the patient): 2023    ASSESSMENT:  Damion Quinones is a 67 year old male with a past medical history significant for decompensated alcohol related + hereditary hemochromatosis (homozygous H63D) cirrhosis complicated by variceal bleeding s/p TIPS (10/1/2022) and hepatic encephalopathy. PMHx also includes coronary artery disease, alcohol overuse, obesity, ESRD on HD -- (IgA nephropathy + ANCA vasculitis), psoriatic arthritis, paroxysmal atrial fibrillation (on warfarin), DVT, recurrent c diff, and HTN.  He is now s/p  donor liver and kidney transplant on 23 with Dr. Clifton. He was discharged from the SICU on  and readmitted on  due to the need for CRRT per nephrology.     PLAN:    Updates today (2023)  - Initiate CRRT   - line placement by IR   - CRRT managed per transplant nephrology   - transfer back to SICU  - Holding SQH due to pending line placement  - No diuresis today   - Restart midodrine 5 mg TID  - Plan to start heparin drip following IR HD line placement with aPTT goal of 30-40; Xa goal 0.1-0.2 per transplant   - Renal ultrasound with doppler today  - Sodium bicarb TID started today per nephrology      Neurological:  # Acute post-surgical pain   # Hx Alcohol use disorder, sober since   - Monitor neurological status. Delirium preventions and precautions  - Sedation plan: None indicated  - Pain control: PRN oxycodone 2.5-5 mg q4h, robaxin 500 q4h PRN  # Aphasia, right sided gaze preference, inability to follow commands  # Encephalopathy  - Stroke code called ~0615   - Appreciate neurology evaluation and assistance  - Head CT w/o contrast with no acute intracranial pathology   - MRA head/neck  (w/o contrast) with no acute pathology related to presentation  - vEEG showing encephalopathy and negative for epileptiform activity   - Per discussion with patient's wife, symptoms are consistent with prior episodes of encephalopathy with increased ammonia levels (including gaze preference). Ammonia 18 6/12.   - Neurologic status improving, continue with stimulation and activity. If no improvement with resolution of uremia, consider replacing tacrolimus with cyclosporine.     Pulmonary:   # Post operative ventilatory support, resolved  # Acute hypoxic respiratory failure requiring mechanical ventilation, resolved  # Bilateral small pleural effusions  - Successfully extubated 6/7 to NC. Maintaining oxygen saturations on room air.   - Aggressive pulmonary hygiene, IS, encourage OOB  - Supplemental O2 as needed to maintain SpO2 > 92%  - Continue chest physiotherapy  - Ongoing weak cough, poor clearance of thick secretions 6/14   - Continue above therapies; Monitor resp status closely   - Plan for CXR tomorrow AM for comparison      Cardiovascular:    #Hx pAfib on PTA warfarin  # Afib with RVR, improving  #Hx CAD  #Hx HTN  #Hypotension, orthostatic  #Shock, likely septic- improving  - ECHO 6/5/23: afib, LVEF 55-60%  - MAP goal 60-85, SBP goal 110-160. 6/10 peripheral levophed started for persistent hypotension, off since 0100 6/12.    - midodrine 5 mg TID restarted   - PTA metoprolol succinate ER 25 daily. Increased to 25mg q8h on 6/13.   - metoprolol 5 mg IV PRN for sustained tachycardia >120  - Continue to hold PTA warfarin  - continue ASA 81 mg daily, PTA atorvastatin hold until outpatient  - Cardiology consulted; Signed off 6/11    -- TTE completed, EF 55-60%, normal ventricular function   -- Allow for permissive tachycardia, long term goal HR < 110, okay for spikes to 120-130s   -- Anticoagulation for Afib when safe from a surgical standpoint,    -- OK for straight rate heparin gtt today post IR procedure per  transplant; Start at 600 u/hr non-titratable per transplant attending     Gastroenterology/Nutrition:  # Post-operative ileus, resolved  # At risk for malnutrition  # Hx of decompensated alcohol related + hereditary hemochromatosis (homozygous H63D) cirrhosis (MELD-Na 30) complicated by variceal bleeding s/p TIPS (10/1/2022) and hepatic encephalopathy now s/p DDLT   # Direct hyperbilirubinemia  -  repeat liver US: persistent low resistance waveforms and low resistive indices throughout hepatic artery system concerning for ischemia, stable velocities and upstroke  - LFTS overall trending down, TBili 2.1 (2.3), dBili 1.83 (1.96), alk phos 206 (138), AST 66 (61),  (158)  - MAE drain with bilious tinged output . Transplant aware, monitor for now. Output in last 24 hours: 55 cc   -  mg daily   - Ursodiol 250 mg BID   - Post-pyloric feeding tube placed by radiology   - Nutrition consulted and following, appreciate input. Trickle feeds started via PPFT ,  Continue TF at goal    - Bowel regimen, senna PRN   - PPI (Protonix)  - SLP evaluation  with NPO recommendations.     Renal/ Fluids/Electrolytes:   #Lactic acidosis, resolved  # Hx ESRD on HD MWF (IgA nephropathy + ANCA vasculitis) now s/p DDKT 23  # Delayed graft function, improving  - Expect some delayed graft function with  donor kidney, gradually increasing urine output over the past few days, responsive to diuretics  - transplant nephrology consulted and following, appreciate expertise  - Kidney ultrasound  with patent vessels   - Renal ultrasound obtained today    - CRRT discontinued 6/10, dialysis line removed due to positive blood cultures. Nephrology recommending CRRT be started today () instead of HD due to tenous hemodynamics. IR to place line.   - Lux in place, continue for close UOP monitoring  - No diureses today. 65 ml of UOP since midnight.   - Cr 3.83> 4.16> 4.77> 4.95> 5.53   #  hyperphosphatemia  - Phos 5.7> 7.7> 8.7> 8.5> 8.2  # hypocalcemia  - ionized Ca 4.2> 4.3> 4.3  # hypermagnesemia    - Mg 2.9> 2.8> 2.9  # Hyperbicarbonatemia  - Worsening bicarb levels last several days  - Sodium bicarb TID started per nephrology 6/14      Endocrine:  # Stress hyperglycemia, improving  -Currently on high correction ISS. Goal to keep BG< 180 for optimal wound healing      ID:  # Stress leukocytosis, improving  # Post-transplant ppx per transplant  - WBC 8.6 from 7.7  -Perioperative antibiotics: Zosyn x 48 hrs (completed 6/8), micafungin x 14 days  -Immunosuppression per transplant: no further doses of thymoglobulin, 1.5 mg BID oral tacrolimus, 10 mg oral prednisone taper to 5 mg for chronic use,  mg BID  -Prophylactic regimen: Valganciclovir, Bactrim Ppx (unhold today, resuming PO meds)  # Enterococcus bacteremia  # Septic shock, resolved  - 6/9 blood cultures x2 growing E. Faecium. 6/10, 6/11, 6/12 blood cx NGTD.   - Transplant ID consulted and following, appreciate recs  - Abx: linezolid and Zosyn stopped (610-6/12). Vancomycin started 6/12. Continue per ID recommendations.   - Daily blood cultures until clear. Stopped daily blood cultures 6/13    Heme:     # Acute blood loss anemia   # Anemia of critical illness and chronic renal disease  # Hx hereditary hemochromatosis   # Thrombocytopenia 2/2 liver dysfunction  - Transfusion goals: INR <2, Platelets > 20, Fibrinogen > 200, Hgb >8.0  - Hgb 8.2, Plt 89, INR 1.06    Rheumatologic:  #Hx Gout  - Hold PTA prednisone 10mg daily  - Transitioning methylprednisolone 8 mg taper back to prednisone 10 mg 6/13 per transplant. Taper to 5 mg for chronic use.     Musculoskeletal:  # Weakness and deconditioning of critical illness   - Physical and occupational therapy consult, appreciate recommendations. Encourage increased time OOB when mental status appropriate.     Skin:  -Diligent skin care to prevent injury    General Cares/Prophylaxis:    DVT  Prophylaxis: SQH q12 (hold until line placement), SCDs  GI Prophylaxis: Protonix BID  Restraints: Restraints for medical healing needed: No    Lines/ tubes/ drains:  - PIVs  - Lux catheter  - Intraabdominal MAE drain x1   - Axillary arterial line  - NJ     Disposition:  - Surgical ICU    Discussed with SICU staff on multidisciplinary team rounds    Leatha Solano, MS4     I was present with the medical student who participated in the service and in the documentation of the note. I have verified the history and personally performed the physical exam and medical decision making. I agree with the assessment and plan of care as documented in the note.    I personally managed encephalopathy, intake/ouput evaluation and planning, planning for iHD line placement, electrolyte derangements, hemodynamics and anticoagulation planning. I spent a total of 30 minutes providing critical care services at the bedside, and on the critical care unit, evaluating the patient, directing care and reviewing laboratory values and radiologic reports for the patient.     Leonel Streeter PA-C  06/14/23  3:34 PM    - - - - - - - - - - - - - - - - - - - - - - - - - - - - - - - - - - - - - - - - - - - - - -   INTERVAL EVENTS/SUBJECTIVE:     No acute events overnight. Ongoing encephalopathy. Has been getting up to chair. No acute status changes, need to transfer back to ICU status for initiation of CRRT. Off unit for HD line placement with IR.     PHYSICAL EXAMINATION:  Temp:  [96.3  F (35.7  C)-97.9  F (36.6  C)] 97  F (36.1  C)  Pulse:  [] 117  Resp:  [9-36] 22  MAP:  [61 mmHg-109 mmHg] 87 mmHg  Arterial Line BP: ()/(14-88) 109/65  SpO2:  [92 %-99 %] 98 %     General: NAD, appears comfortable  HEENT: NG and NJ secure in place. PERRL. Jaundice appearance, scleral icterus- decreased.   Pulm/Resp: Breathing unlabored on room air, equal chest rise, no audible wheezing. Coarse breath sounds bilaterally on expiration, decreased compared  to previous days.   CV: irregularly irregular rhythm on monitor, normal rate  Abdomen: distended, chevron incision without drainage, RLQ prevena holding suctoin, MAE x 1 to bulb suction with serosanguinous bile tinged output, soft reducible umbilical hernia. Bowel sounds present. No pain with palpation.   : stauffer catheter in place with minimal pavel urine output.   MSK/Extremities: peripheral edema, peripheral pulses intact.   Skin: scattered purpura throughout face, chest, abdomen, and arms, jaundiced- decreased, no erythema or streaking   Neuro: Difficult to rouse, grunts incoherent sounds to questioning. not oriented to person, place, or time. Does not respond to prompting. Does not follow commands.     LABS: Reviewed.   Arterial Blood Gases   Recent Labs   Lab 06/12/23  0535 06/11/23  1833 06/11/23  1244 06/10/23  1807   PH 7.34* 7.34* 7.34* 7.37   PCO2 38 39 39 42   PO2 176* 128* 124* 107*   HCO3 20* 21 21 24     Complete Blood Count   Recent Labs   Lab 06/14/23  0357 06/13/23  0351 06/12/23  1145 06/12/23  0354   WBC 7.6 8.6 8.1 7.7   HGB 8.2* 8.0* 8.1* 6.6*   PLT 89* 55* 50* 45*     Basic Metabolic Panel  Recent Labs   Lab 06/14/23  1129 06/14/23  0800 06/14/23  0404 06/14/23  0357 06/13/23  1550 06/13/23  1248 06/13/23  0737 06/13/23  0351 06/12/23  1150 06/12/23  1145   NA  --   --   --  135*  --  135*  --  137  --  138   POTASSIUM  --   --   --  4.6  --  5.1  --  5.1  --  5.1   CHLORIDE  --   --   --  98  --  100  --  100  --  102   CO2  --   --   --  16*  --  16*  --  17*  --  17*   BUN  --   --   --  111.0*  --  93.6*  --  84.7*  --  71.9*   CR  --   --   --  5.53*  --  4.95*  --  4.77*  --  4.16*   * 114* 116* 130*   < > 131*  137*   < > 105*  114*   < > 110*    < > = values in this interval not displayed.     Liver Function Tests  Recent Labs   Lab 06/14/23  0357 06/13/23  0351 06/12/23  0354 06/11/23  1650 06/11/23  0339   AST 66* 61* 75*  --  105*   * 158* 186*  --  264*   ALKPHOS  206* 138* 119  --  151*   BILITOTAL 2.1* 2.3* 3.0*  --  4.9*   ALBUMIN 2.3* 2.4* 2.3*  --  2.4*   INR 1.06 1.23* 1.27* 1.29* 1.30*     Pancreatic Enzymes  No lab results found in last 7 days.  Coagulation Profile  Recent Labs   Lab 06/14/23  0357 06/13/23  0351 06/12/23  0354 06/11/23  1650   INR 1.06 1.23* 1.27* 1.29*   PTT 26 27 30 29       IMAGING:  Recent Results (from the past 24 hour(s))   US Renal Transplant with Doppler    Narrative    ULTRASOUND RENAL TRANSPLANT WITH DOPPLER  6/14/2023 9:14 AM    CLINICAL HISTORY: UOP 0 mL overnight and Cr up; please evaluate. Right  lower quadrant renal transplant placed 6/7/2023. By surgical report,  there were 3 renal arteries. Two were joined to a common patch and  there were 2 renal artery anastomoses. Single renal vein and ureter  per surgical report.    COMPARISONS: Ultrasound renal transplant with Doppler 6/11/2023    REFERRING PROVIDER: SHANE NELSON    TECHNIQUE: Right lower quadrant renal transplant evaluated with  grayscale, color Doppler, and Doppler waveform ultrasound. Transplant  renal vasculature evaluated with color Doppler and Doppler waveform  ultrasound.    Cine clips through the renal transplant were saved in the patient's  record.    FINDINGS:   RIGHT LOWER QUADRANT RENAL TRANSPLANT:         Fluid collections: None         Renal artery:            Hilum, superior: 38/8 cm/s - RI 0.78            Hilum, mid: 24/6 cm/s - RI 0.77            Hilum, inferior: 20/5 cm/s, - RI 0.74              Anastomosis: 144/25 cm/s - RI 0.82         Renal artery - iliac ratio: 1.69         Renal vein:            Hilum: 22 cm/s            Anastomosis: 52 cm/s         Length: 11.0 cm         Arcuate artery resistive index (superior / mid / inferior): 0.82  / 0.67 / 0.80 (previously 0.69 / 0.69 / 0.68)         Parenchyma: Normal. No stone, mass, or hydronephrosis.    Iliac artery:       Above anastomosis: 85/0 cm/s       Below anastomosis: 133 cm/s    Iliac vein:        Above anastomosis: 114 cm/s (previously 71 cm/s)       Below anastomosis: 86 cm/s    Bladder: Not visualized.      Impression    IMPRESSION:   1. Three renal arteries by surgical report. Two were joined to a  common patch and there were 2 renal artery anastomoses. Only 1 of the  arterial anastomoses was visualized today. No renal transplant  arterial or venous stenosis demonstrated.    2. Faster than expected iliac vein velocity above the anastomosis may  represent edema.    3. Elevated superior arcuate artery resistive index.    4. Negative grayscale appearance of the renal transplant.    GUIDELINES:  For native kidneys:       Diagnostic criteria suggestive of > 60% diameter stenosis             Renal artery:             Peak systolic velocity > 180 cm/s             RAR > 3.5             Turbulent flow         Arcuate artery Resistive Index Normal < 0.7    For renal transplants:       Renal transplant peak systolic velocity criteria range from > 180  cm/s to > 400 cm/s       Source suggests using:            Peak systolic velocity > or = 300 cm/s            Spectral broadening            Intraparenchymal arterial acceleration time > or = 0.1 s     Source: Madie SAHU, Karon MAE, David ZAVALA, et al. Screening for  transplant renal artery stenosis: Ultrasound-based stenosis  probability stratification. American Journal of Roentgenology.  2017;209: 9563-3653. [id number]    JACIEL AU MD         SYSTEM ID:  [id number]

## 2023-06-14 NOTE — CONSULTS
"    Interventional Radiology  [facility] Consult Service Note  06/14/23   8:48 AM    Consult Requested: \"Temporary HD Line\"    Recommendations/Plan:    Patient is on IR schedule 6/14 for a NTCVC placement for HD.   Labs WNL for procedure.  Orders entered for procedure, No NPO required.  Consent will be done prior to procedure with wife.     Please contact the IR charge RN at [phone] for estimated time of procedure.     Case and imaging discussed with IR attending, Dr. Welch. Recommendations were reviewed with Dr. Bermudez.    This is a 67 year old male with PMHx of decompensated alcohol + hemochromatosis (homozygous H63D) cirrhosis complicated by variceal bleeding s/p TIPS (10/1/2022) and hepatic encephalopathy + CKD (IgA nephropathy + ANCA vasculitis) s/p simultaneous liver kidney transplant on 6/6/2023. RTOR on 6/6/203 with concern for low flow in the renal graft - ex-lap, washout, closure with healthy appearing graft with intact arterial and venous flow. Pt with delayed kidney graft function and ongoing need for RRT. Pt with history of LIJ TCVC for HD. This line was reportedly removed bedside due to infection. Unclear when or what service performed this. IR is now consulted for NTCVC placement for HD access. Pt is pending transfer to  and not on an ICU service that can place CVC.    Pertinent Imaging Reviewed: NA    Expected date of discharge:  TBD    Vitals:   BP 90/76 (BP Location: Other (Comment))   Pulse (!) 121   Temp 96.8  F (36  C) (Axillary)   Resp 16   Ht 1.702 m (5' 7\")   Wt 108.3 kg (238 lb 12.1 oz)   SpO2 93%   BMI 37.39 kg/m      Pertinent Labs:   Lab Results   Component Value Date    WBC 7.6 06/14/2023    WBC 8.6 06/13/2023    WBC 8.1 06/12/2023     Lab Results   Component Value Date    HGB 8.2 06/14/2023    HGB 8.0 06/13/2023    HGB 8.1 06/12/2023     Lab Results   Component Value Date    PLT 89 06/14/2023    PLT 55 06/13/2023    PLT 50 06/12/2023     Lab Results   Component " Value Date    INR 1.06 06/14/2023    PTT 26 06/14/2023     Lab Results   Component Value Date    POTASSIUM 4.6 06/14/2023    POTASSIUM 3.7 06/07/2023    POTASSIUM 4.6 08/27/2019        COVID-19 Antibody Results, Testing for Immunity         No data to display            COVID-19 PCR Results        12/16/2022    20:25 1/15/2023    15:05 1/23/2023    06:09 2/13/2023    07:43 4/7/2023    16:30   COVID-19 PCR Results   SARS CoV2 PCR Negative   Negative   Negative   Negative   Negative             6/5/2023    14:09   COVID-19 PCR Results   SARS CoV2 PCR Negative         JADE العراقي CNP  Interventional Radiology  Pager: [phone]

## 2023-06-14 NOTE — PROGRESS NOTES
Transplant Social Work Services Progress Note    Date of Initial Social Work Evaluation: 11/22/2022  Collaborated with: Patient at bedside (unable to participate in conversation), patient's spouse at bedside.     Data: Patient underwent an SLK on 6/6/2023.   Intervention: I met with Apoorva at bedside and introduced self. Apoorva identifies that it has been tough since transplant and feels like when he makes some progress, he takes a few steps back the next day. She became tearful during our conversation and reported that she has enough support from friends/family. She denied any questions/concerns.   Assessment: No new assessment. Casey remains on the ICU.   Education provided by SW: No new education  Plan:    Discharge Plans in Progress: TBD    Barriers to d/c plan: Medical stability    Follow up Plan: Primary transplant  to continue to follow during his hospitalization.     MOUNIKA Lyles, [facility]  Liver Transplant   [facility]  Phone: [phone]  Pager: [phone]

## 2023-06-14 NOTE — IR NOTE
Patient Name: Damion Quinones  Medical Record Number: [id number]  Today's Date: 6/14/2023    Procedure: non-tunneled central venous catheter placement  Proceduralist: MD Marycruz Beck  Pathology present: N/A  Brief completed: N/A    Procedure Start: 1500  Procedure end: 1538  Sedation medications administered: local lidocaine only    Report given to: Nancy MACEDO RN  : N/A    Other Notes: Pt arrived to IR room 04 from . Consent reviewed. Pt denies any questions or concerns regarding procedure. Pt positioned supine and monitored per protocol. Pt tolerated procedure without any noted complications. Right external jugular site cleansed and dressed per protocol. Both lumens of central venous catheter heparin locked with 1,500 units of heparin (1.5 mL). Pt transferred back to .

## 2023-06-14 NOTE — PROCEDURES
[facility]    Procedure: IR Procedure Note    Date/Time: 6/14/2023 3:44 PM    Performed by: Rupesh Joel MD  Authorized by: Alia Fraga MD      UNIVERSAL PROTOCOL   Site Marked: NA  Prior Images Obtained and Reviewed:  Yes  Required items: Required blood products, implants, devices and special equipment available    Patient identity confirmed:  Verbally with patient, arm band, provided demographic data and hospital-assigned identification number  Patient was reevaluated immediately before administering moderate or deep sedation or anesthesia  Confirmation Checklist:  Patient's identity using two indicators, relevant allergies, procedure was appropriate and matched the consent or emergent situation and correct equipment/implants were available  Time out: Immediately prior to the procedure a time out was called    Universal Protocol: the Joint Commission Universal Protocol was followed    Preparation: Patient was prepped and draped in usual sterile fashion       ANESTHESIA    Anesthesia: Local infiltration  Local Anesthetic:  Lidocaine 1% without epinephrine      SEDATION  Patient Sedated: Yes    Sedation:  Fentanyl and midazolam  Vital signs: Vital signs monitored during sedation    See dictated procedure note for full details.  Findings: R EJ non tunnelled dialysis line placed. Ready for use.  RIJ is questionably partially thrombosed.     Specimens: none    Complications: None    Condition: Stable    Plan: R EJ non tunnelled dialysis line placed. Ready for use.  RIJ is questionably partially thrombosed.       PROCEDURE  Describe Procedure: R EJ non tunnelled dialysis line placed. Ready for use.  RIJ is questionably partially thrombosed.   Patient Tolerance:  Patient tolerated the procedure well with no immediate complications  Length of time physician/provider present for 1:1 monitoring during sedation: 30

## 2023-06-14 NOTE — PRE-PROCEDURE
GENERAL PRE-PROCEDURE:   Procedure:  Non-tunneled central venous catheter placement  Date/Time:  6/14/2023 3:18 PM    Verbal consent obtained?: Yes    Risks and benefits: Risks, benefits and alternatives were discussed    Consent given by:  Spouse  ASA Class:  2  History & Physical reviewed:  History and physical reviewed and no updates needed  Statement of review:  I have reviewed the lab findings, diagnostic data, medications, and the plan for sedation

## 2023-06-14 NOTE — PROGRESS NOTES
[facility]   Transplant Nephrology Progress Note  Date of Admission:  6/5/2023  Today's Date: 06/14/2023    Recommendations:  - recommend a temporary HD line placement for RRT today for clearance.   - plan to start CVVHDF instead of HD today given tenuous hemodynamics  - adjust meds to CVVHDF dosing  - continue prednisone 10 mg, FK goal close to 8  - MMF increased to 750 mg po bid 6/14, BCx neg as of 6/11 on reduced dose since 6/10, will monitor hemodynamics and low threshold to reduce dose again if unstable    Assessment & Plan   # DDKT: DGF resume CRRT today for clearance   - Baseline Creatinine: ~ TBD   - Proteinuria: Not checked post transplant   - Date DSA Last Checked: Jun/2023      Latest DSA: Low level DSA (<1000 mfi) to CW9,    - BK Viremia: Not checked post transplant   - Kidney Tx Biopsy: No    -Pt was receiving CRRT from 6/6-6/10, stopped and LIJ tunneled line removed due to  bacteremia. Resume 6/14   -No acute indication for RRT today.off pressors 6/12.   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   - management per tx surgery    # Immunosuppression: Tacrolimus immediate release (goal 5-8), Mycophenolate mofetil (dose 500 mg every 12 hours) and Prednisone (dose taper) to 10 mg daily    - Induction with Recent Transplant:  rATG total 4mg/kg, stopped due to sepsis   - Changes: increase MMF to 750/750 6/14     # Infection Prophylaxis:   - PJP: Sulfa/TMP (Bactrim), holding with ileus   - CMV: Valganciclovir (Valcyte), holding with ileus, CMV IgG Ab positive  - Thrush: Micafungin (Mycamine)  - Fungal: Micafungin (Mycamine)    # History of C.diff:   -Consider PO vanc with abx, last cdiff screen neg    # E.Faecium bacteremia:   -Diagnosed on 6/9 BlCx. Pending sensitivities. Obtain cultures daily until clearance   -Broad spectrum abx (linezolid, vanc, zosyn x48h) , transplant ID consulted,  appreciate recommendations   -Removed tunneled lines and all other lines on 6/10   -BCx NGTD 6/11 -If cultures don't clear consider ASHLEY. TTE w/ poor windows was negative on 6/10    # ANCA vasculitis:   -S/p Rituximab x2   -Obtain intermittent U/A     # Paroxysmal A-Fib: On coumadin and metoprolol ER 25 mg daily PTA. Consider switching to eliquis when kidney and liver are functioning.    -Agree with IV metoprolol 5mg q4h     # Hypotension: Hypotensive, off pressors 6/12;  Goal BP: MAP > 65   - Volume status: hypervolemic EDW ~ 96.4 kg (on HD)   - Changes: Not at this time.    # Confusion:   - aphasia+confusions: code stroke 6/11, CTH unremarkable, MRI brain acute to subacute  infarct in the right centrum semiovale.EEG shows slowing consistent with moderate to  severe diffuse encephalopathy.      # Elevated Blood Glucose: Glucose generally running ~ 160s-200   - Management as per primary team.  On insulin gtt.    # Anemia in Chronic Renal Disease and acute blood loss: Hgb: Trend down      FEDERICO: No   - Iron studies: Unknown at this time, but checked with dialysis    -Transfuse for Hb<7    # Mineral Bone Disorder:   - Secondary renal hyperparathyroidism; PTH level: Minimally elevated ( pg/ml)        On treatment: None  - Vitamin D; level: Not checked recently, but was normal last check        On supplement: No  - Calcium; level: Normal when corrected for low alb       On supplement: No  - Phosphorus; level: High     On binder: No    # Ileus:   -NG placed 6/10    # Electrolytes:   - Potassium; level: High normal         On supplement: No  - Magnesium; level: High          On supplement: No  - Bicarbonate; level: Low normal         On supplement: No  - Sodium; level: Normal     # Gout:    - On prednisone 10 mg PO daily PTA. Eventual plan to wean prednisone down to 5mg daily and then will consider cortisol stimulation test    # Transplant History:  Etiology of Kidney Failure: IgA nephropathy and ANCA vasculitis  Tx:  Liver Tx (SLK)  Transplant: 2023 (Liver), 2023 (Kidney)  Significant changes in immunosuppression: None  Significant transplant-related complications: None    Recommendations were communicated to the primary team verbally       Darrel Patino MD   Pager: [phone]    Interval History   More lethargic today, not following commands, tenuous hemodynamics with hypotension to SBP~80s requiring midodrine  Oligo-anuric today 70 ml UOP only     Review of Systems   4 point ROS was obtained and negative except as noted in the Interval History.    MEDICATIONS:    aspirin  325 mg Oral Daily     B and C vitamin Complex with folic acid  5 mL Oral Daily     heparin ANTICOAGULANT  5,000 Units Subcutaneous Q12H     insulin aspart  1-6 Units Subcutaneous Q4H     metoprolol tartrate  25 mg Oral Q8H KARAN     micafungin  100 mg Intravenous Q24H     mycophenolate  500 mg Oral BID IS     pantoprazole  40 mg Per Feeding Tube QAM AC     predniSONE  10 mg Per Feeding Tube Daily     protein modular  1 packet Per Feeding Tube TID     sodium chloride (PF)  3 mL Intravenous Q8H     sulfamethoxazole-trimethoprim  1 tablet Oral Once per day on      tacrolimus  1.5 mg ORAL OR NJ TUBE BID IS     ursodiol  250 mg Oral BID     valGANciclovir  450 mg Oral Once per day on     Or     valGANciclovir  450 mg Oral or NG Tube Once per day on      vancomycin place issa - receiving intermittent dosing  1 each Intravenous See Admin Instructions       dextrose         Physical Exam   Temp  Av.9  F (36.6  C)  Min: 97  F (36.1  C)  Max: 99.2  F (37.3  C)  Arterial Line BP  Min: 60/45  Max: 131/80  Arterial Line MAP (mmHg)  Av mmHg  Min: 52 mmHg  Max: 101 mmHg      Pulse  Av.5  Min: 68  Max: 159 Resp  Av.8  Min: 14  Max: 23  FiO2 (%)  Av.3 %  Min: 40 %  Max: 50 %  SpO2  Av.1 %  Min: 92 %  Max: 100 %    CVP (mmHg): 6 mmHgBP 90/76 (BP Location: Other (Comment))   Pulse (!) 121   Temp 97.7  F (36.5  C)  "(Axillary)   Resp 16   Ht 1.702 m (5' 7\")   Wt 108.3 kg (238 lb 12.1 oz)   SpO2 93%   BMI 37.39 kg/m      Admit Weight: 102.3 kg (225 lb 8 oz)     GENERAL APPEARANCE: lethargic  RESP: lungs clear to auscultation - no rales, rhonchi or wheezes  CV: regular rhythm, normal rate, no rub, no murmur  EDEMA: ++ LE edema bilaterally  ABDOMEN: soft, nondistended, nontender, bowel sounds normal  MS: extremities normal - no gross deformities noted, no evidence of inflammation in joints, no muscle tenderness  SKIN: no rash  Neuro: lethargic difficult to arouse   DIALYSIS ACCESS:  None    Data   All labs reviewed by me.  CMP  Recent Labs   Lab 06/14/23  0404 06/14/23  0357 06/14/23  0117 06/13/23  1953 06/13/23  1550 06/13/23  1248 06/13/23  0737 06/13/23  0351 06/12/23  1150 06/12/23  1145 06/12/23  0758 06/12/23  0354 06/11/23  0605 06/11/23  0339   NA  --  135*  --   --   --  135*  --  137  --  138  --  136  --  137   POTASSIUM  --  4.6  --   --   --  5.1  --  5.1  --  5.1  --  5.1  --  4.9   CHLORIDE  --  98  --   --   --  100  --  100  --  102  --  100  --  102   CO2  --  16*  --   --   --  16*  --  17*  --  17*  --  19*  --  20*   ANIONGAP  --  21*  --   --   --  19*  --  20*  --  19*  --  17*  --  15   * 130* 117* 115*   < > 131*  137*   < > 105*  114*   < > 110*   < > 108*  113*   < > 120*   BUN  --  111.0*  --   --   --  93.6*  --  84.7*  --  71.9*  --  66.0*  --  48.0*   CR  --  5.53*  --   --   --  4.95*  --  4.77*  --  4.16*  --  3.83*  --  2.58*   GFRESTIMATED  --  11*  --   --   --  12*  --  13*  --  15*  --  17*  --  27*   NAZARIO  --  8.4*  --   --   --  8.1*  --  8.0*  --  7.8*  --  7.7*  --  7.8*   MAG  --  2.9*  --   --   --  2.8*  --  2.9*  --   --   --  2.7*  --  2.6*   PHOS  --  8.2*  --   --   --  8.5*  --  8.7*  --   --   --  7.7*  --  5.7*   PROTTOTAL  --  5.0*  --   --   --   --   --  4.9*  --   --   --  4.5*  --  4.5*   ALBUMIN  --  2.3*  --   --   --   --   --  2.4*  --   --   --  2.3*  --  " 2.4*   BILITOTAL  --  2.1*  --   --   --   --   --  2.3*  --   --   --  3.0*  --  4.9*   ALKPHOS  --  206*  --   --   --   --   --  138*  --   --   --  119  --  151*   AST  --  66*  --   --   --   --   --  61*  --   --   --  75*  --  105*   ALT  --  148*  --   --   --   --   --  158*  --   --   --  186*  --  264*    < > = values in this interval not displayed.     CBC  Recent Labs   Lab 06/14/23  0357 06/13/23  0351 06/12/23  1145 06/12/23  0354   HGB 8.2* 8.0* 8.1* 6.6*   WBC 7.6 8.6 8.1 7.7   RBC 2.74* 2.61* 2.68* 2.16*   HCT 25.5* 24.3* 25.1* 20.6*   MCV 93 93 94 95   MCH 29.9 30.7 30.2 30.6   MCHC 32.2 32.9 32.3 32.0   RDW 17.5* 17.3* 17.2* 16.6*   PLT 89* 55* 50* 45*     INR  Recent Labs   Lab 06/14/23 0357 06/13/23  0351 06/12/23  0354 06/11/23  1650   INR 1.06 1.23* 1.27* 1.29*   PTT 26 27 30 29     ABG  Recent Labs   Lab 06/12/23  0535 06/11/23  1833 06/11/23  1244 06/10/23  1807   PH 7.34* 7.34* 7.34* 7.37   PCO2 38 39 39 42   PO2 176* 128* 124* 107*   HCO3 20* 21 21 24   O2PER 28 2 5 4      Urine Studies  Recent Labs   Lab Test 06/10/23  1613 06/05/23  1620 04/07/23  1246 02/13/23  0743   COLOR Orange* Yellow Yellow Yellow   APPEARANCE Cloudy* Clear Clear Clear   URINEGLC 30* Negative Negative Negative   URINEBILI Negative Negative Negative Negative   URINEKETONE Negative Negative Negative Negative   SG 1.021 1.012 1.011 1.014   UBLD Large* Moderate* Moderate* Negative   URINEPH 5.5 5.5 5.5 5.0   PROTEIN 100* 10* 10* 10*   NITRITE Negative Negative Negative Negative   LEUKEST Large* Negative Negative Negative   RBCU >182* 2 5* 1   WBCU 128* 7* 2 2     No lab results found.  PTH  Recent Labs   Lab Test 05/19/23  0956   PTHI 67*     Iron Studies  Recent Labs   Lab Test 11/22/22  0848   IRON 68   *   IRONSAT 31   HOLA 429*       IMAGING:  All imaging studies reviewed by me.

## 2023-06-14 NOTE — PLAN OF CARE
Lethargic/somnolent. Opens eyes intermittently to voice/pain. Not following commands, but moves all extremities spontaneously. Plan to start CRRT to improve mentation. Afebrile. A fib 90-120s, PRN Metoprolol given x1. SBPs soft - Midodrine restarted. Room air. Weak cough, requiring nasopharygeal suctioning. ND w/ TF @ 40ml/hr FWF 30ml Q4H. BM x2. Lux in place - oliguric. Skin unchanged. Went to IR for HD line placement & CRRT to be started after. Continue to monitor and follow POC.

## 2023-06-14 NOTE — PROGRESS NOTES
"CRRT INITIATION NOTE    Consent for CRRT Completed:  YES  Patient s Vascular Access: Catheter              Placement Confirmed: YES  Manufacture:  MICHEAL  Model:  MICHEAL  Length/Andorran Size:  15cm?/14Fr  Flush Volume:  1/1.2    DATA:  Procedure:  CRRT  Start Time:  1709  Machine#:  1  Filter:    Blood Flow:  180  ML/min  Pre-Replacement Solution:  Phoxillum BK 4/2.5  Post-Replacement Solution:  PrismaSol BGK 2/3.5  Dialysate Solution:  PrismaSol BGK 2/3.5  Pre-Replacement Solution Rate:  1400 mL/hr  Post-Replacement Solution Rate:  200 mL/hr  Dialysate Flow Rate:  1400 mL/hr   Patient Removal Rate:  0 mL/hr  Anticoagulation Type and Rate:  N/A    ASSESSMENT:  How Patient Tolerated Initiation:   Vital Signs:  BP 90/76 (BP Location: Other (Comment))   Pulse 110   Temp 96.8  F (36  C) (Axillary)   Resp 16   Ht 1.702 m (5' 7\")   Wt 108.3 kg (238 lb 12.1 oz)   SpO2 90%   BMI 37.39 kg/m    Initial Pressures:  Access:  -54  Filter:  128  Return:  55  TMP:  25  Change in Filter Pressure:  36      INTERVENTIONS:  CRRT initiated, goal is intake = output. Lines reversed.     PLAN:  Continue therapy per ordered goals. Contact CRRT Resource 1 in [facility] with any questions/concerns.          "

## 2023-06-14 NOTE — PLAN OF CARE
ICU End of Shift Summary. See flowsheets for vital signs and detailed assessment.    Changes this shift: Took over care of this patient for 0611-8416 shift. Pt returned from IR just before 1600, lethargic and making incomprehensible sounds (no sedation received in IR per report). CRRT started at 1709; VSS. Pt continues to moan/groan and cough on thick secretions, but unable to cough into mouth for suction. NT suctioned without results as well. RT at bedside and performed CPT; team ordered mucomyst nebs (RT notified, yet to be given). Gag reflex intact, but closely monitoring for airway compromise. Heparin gtt started this evening. TF restarted upon return from IR. Wife, Apoorva, present and updated at bedside.    Plan:  Continue pulmonary hygiene with help from RT (CPT and mucomyst). Continue CRRT per ordered goals. Update team with changes.      Goal Outcome Evaluation:      Plan of Care Reviewed With: patient, spouse    Overall Patient Progress: no changeOverall Patient Progress: no change    Outcome Evaluation: Start CRRT; working on pulmonary hygiene

## 2023-06-14 NOTE — PLAN OF CARE
Major Shift Events: RN took over cares at 2300.  Neuro: Lethargic. Intermittently nods yes and no to questions, will intermittently follow simple commands. Pupils equal and reactive, tracks, slight R side gaze. Incomprehensible noises. Weak grasp.  Resp: RA. Infrequent weak semi-productive cough. Thick red streaked secretions. Coarse LS.   CV: Afib 100s - 130s. IV PRN metoprolol given twice overnight. Scheduled metoprolol given this AM. Stable BP. Afebrile. +3 edema.   GI/: NPO except for ice chips. NJ w/ TF @ goal 40 ml/hr. 2 large loose BM overnight. No urine output since 0000. Unable to bladder scan.   Skin/ Wounds: Abd clamshell incision - primapores in place. BUE weeping - burn pads in place. Mx skin tears w/ mepilex.  Labs: Blood cultures still positive- pending negative for HD intiation.      Plan: Transfer to . Continue POC. Will continue to monitor pt status and will notify the Transplant team w/ any concerns or changes.  For vital signs and complete assessments, please see documentation flowsheets.     Plan of Care Reviewed With: patient, spouse, child  Overall Patient Progress: no change

## 2023-06-14 NOTE — PHARMACY-VANCOMYCIN DOSING SERVICE
Pharmacy [facility] Dose Change Per Policy  Original Dose Ordered: Vancomycin 1000 mg IV once (intermittent dosing)  Dose Changed To: Vancomycin 1500 mg IV q24h   Plan was changed from patient starting HD to patient going on CRRT.      This dose change was based on the pharmacist's assessment of this patient's age, weight, concurrent drug therapy, treatment goals, whether patient's creatinine clearance adequately indicates renal function (factoring in age, muscle mass, fluid and clinical status), and, if applicable, prior pharmacokinetic data.    Creatinine Clearance=     Estimated Creatinine Clearance: 15.6 mL/min (A) (based on SCr of 5.53 mg/dL (H)).  Will continue to follow and modify dosage according to levels, organ function and clinical condition.    Krissy Yancey, PharmD  PGY1 Pharmacy Resident

## 2023-06-15 ENCOUNTER — APPOINTMENT (OUTPATIENT)
Dept: GENERAL RADIOLOGY | Facility: CLINIC | Age: 68
End: 2023-06-15
Attending: STUDENT IN AN ORGANIZED HEALTH CARE EDUCATION/TRAINING PROGRAM
Payer: COMMERCIAL

## 2023-06-15 ENCOUNTER — APPOINTMENT (OUTPATIENT)
Dept: ULTRASOUND IMAGING | Facility: CLINIC | Age: 68
End: 2023-06-15
Attending: INTERNAL MEDICINE
Payer: COMMERCIAL

## 2023-06-15 ENCOUNTER — APPOINTMENT (OUTPATIENT)
Dept: SPEECH THERAPY | Facility: CLINIC | Age: 68
End: 2023-06-15
Attending: INTERNAL MEDICINE
Payer: COMMERCIAL

## 2023-06-15 ENCOUNTER — APPOINTMENT (OUTPATIENT)
Dept: GENERAL RADIOLOGY | Facility: CLINIC | Age: 68
End: 2023-06-15
Attending: INTERNAL MEDICINE
Payer: COMMERCIAL

## 2023-06-15 LAB
ABO/RH(D): NORMAL
ALBUMIN SERPL BCG-MCNC: 1.9 G/DL (ref 3.5–5.2)
ALBUMIN SERPL BCG-MCNC: 2 G/DL (ref 3.5–5.2)
ALBUMIN SERPL BCG-MCNC: 2.2 G/DL (ref 3.5–5.2)
ALLEN'S TEST: ABNORMAL
ALP SERPL-CCNC: 235 U/L (ref 40–129)
ALT SERPL W P-5'-P-CCNC: 130 U/L (ref 0–70)
ANION GAP SERPL CALCULATED.3IONS-SCNC: 12 MMOL/L (ref 7–15)
ANION GAP SERPL CALCULATED.3IONS-SCNC: 13 MMOL/L (ref 7–15)
ANION GAP SERPL CALCULATED.3IONS-SCNC: 16 MMOL/L (ref 7–15)
ANTIBODY SCREEN: NEGATIVE
APTT PPP: 29 SECONDS (ref 22–38)
APTT PPP: 30 SECONDS (ref 22–38)
AST SERPL W P-5'-P-CCNC: 72 U/L (ref 0–45)
BASE EXCESS BLDA CALC-SCNC: 0.3 MMOL/L (ref -9–1.8)
BASE EXCESS BLDV CALC-SCNC: 1 MMOL/L (ref -7.7–1.9)
BASOPHILS # BLD MANUAL: 0 10E3/UL (ref 0–0.2)
BASOPHILS NFR BLD MANUAL: 0 %
BILIRUB DIRECT SERPL-MCNC: 1.89 MG/DL (ref 0–0.3)
BILIRUB SERPL-MCNC: 2.2 MG/DL
BLD PROD TYP BPU: NORMAL
BLOOD COMPONENT TYPE: NORMAL
BUN SERPL-MCNC: 43.2 MG/DL (ref 8–23)
BUN SERPL-MCNC: 57.1 MG/DL (ref 8–23)
BUN SERPL-MCNC: 73.3 MG/DL (ref 8–23)
CA-I BLD-MCNC: 4.4 MG/DL (ref 4.4–5.2)
CA-I BLD-MCNC: 4.6 MG/DL (ref 4.4–5.2)
CA-I BLD-MCNC: 4.6 MG/DL (ref 4.4–5.2)
CALCIUM SERPL-MCNC: 8 MG/DL (ref 8.8–10.2)
CALCIUM SERPL-MCNC: 8.3 MG/DL (ref 8.8–10.2)
CALCIUM SERPL-MCNC: 8.3 MG/DL (ref 8.8–10.2)
CHLORIDE SERPL-SCNC: 103 MMOL/L (ref 98–107)
CHLORIDE SERPL-SCNC: 103 MMOL/L (ref 98–107)
CHLORIDE SERPL-SCNC: 104 MMOL/L (ref 98–107)
CODING SYSTEM: NORMAL
CREAT SERPL-MCNC: 1.81 MG/DL (ref 0.67–1.17)
CREAT SERPL-MCNC: 2.33 MG/DL (ref 0.67–1.17)
CREAT SERPL-MCNC: 3.14 MG/DL (ref 0.67–1.17)
CROSSMATCH: NORMAL
DEPRECATED HCO3 PLAS-SCNC: 18 MMOL/L (ref 22–29)
DEPRECATED HCO3 PLAS-SCNC: 19 MMOL/L (ref 22–29)
DEPRECATED HCO3 PLAS-SCNC: 21 MMOL/L (ref 22–29)
EOSINOPHIL # BLD MANUAL: 0 10E3/UL (ref 0–0.7)
EOSINOPHIL NFR BLD MANUAL: 0 %
ERYTHROCYTE [DISTWIDTH] IN BLOOD BY AUTOMATED COUNT: 17.6 % (ref 10–15)
ERYTHROCYTE [DISTWIDTH] IN BLOOD BY AUTOMATED COUNT: 17.7 % (ref 10–15)
FIBRINOGEN PPP-MCNC: 798 MG/DL (ref 170–490)
GFR SERPL CREATININE-BSD FRML MDRD: 21 ML/MIN/1.73M2
GFR SERPL CREATININE-BSD FRML MDRD: 30 ML/MIN/1.73M2
GFR SERPL CREATININE-BSD FRML MDRD: 41 ML/MIN/1.73M2
GLUCOSE BLDC GLUCOMTR-MCNC: 101 MG/DL (ref 70–99)
GLUCOSE BLDC GLUCOMTR-MCNC: 138 MG/DL (ref 70–99)
GLUCOSE BLDC GLUCOMTR-MCNC: 147 MG/DL (ref 70–99)
GLUCOSE BLDC GLUCOMTR-MCNC: 153 MG/DL (ref 70–99)
GLUCOSE BLDC GLUCOMTR-MCNC: 161 MG/DL (ref 70–99)
GLUCOSE BLDC GLUCOMTR-MCNC: 186 MG/DL (ref 70–99)
GLUCOSE SERPL-MCNC: 151 MG/DL (ref 70–99)
GLUCOSE SERPL-MCNC: 168 MG/DL (ref 70–99)
GLUCOSE SERPL-MCNC: 204 MG/DL (ref 70–99)
HCO3 BLD-SCNC: 24 MMOL/L (ref 21–28)
HCO3 BLDV-SCNC: 27 MMOL/L (ref 21–28)
HCT VFR BLD AUTO: 21.8 % (ref 40–53)
HCT VFR BLD AUTO: 28.5 % (ref 40–53)
HGB BLD-MCNC: 7 G/DL (ref 13.3–17.7)
HGB BLD-MCNC: 7.4 G/DL (ref 13.3–17.7)
HGB BLD-MCNC: 9.3 G/DL (ref 13.3–17.7)
INR PPP: 1.03 (ref 0.85–1.15)
ISSUE DATE AND TIME: NORMAL
LACTATE SERPL-SCNC: 1 MMOL/L (ref 0.7–2)
LYMPHOCYTES # BLD MANUAL: 0 10E3/UL (ref 0.8–5.3)
LYMPHOCYTES NFR BLD MANUAL: 0 %
MAGNESIUM SERPL-MCNC: 2 MG/DL (ref 1.7–2.3)
MAGNESIUM SERPL-MCNC: 2.1 MG/DL (ref 1.7–2.3)
MAGNESIUM SERPL-MCNC: 2.2 MG/DL (ref 1.7–2.3)
MCH RBC QN AUTO: 30.3 PG (ref 26.5–33)
MCH RBC QN AUTO: 31.5 PG (ref 26.5–33)
MCHC RBC AUTO-ENTMCNC: 32.6 G/DL (ref 31.5–36.5)
MCHC RBC AUTO-ENTMCNC: 33.9 G/DL (ref 31.5–36.5)
MCV RBC AUTO: 93 FL (ref 78–100)
MCV RBC AUTO: 93 FL (ref 78–100)
MONOCYTES # BLD MANUAL: 0.2 10E3/UL (ref 0–1.3)
MONOCYTES NFR BLD MANUAL: 2 %
MYELOCYTES # BLD MANUAL: 0.1 10E3/UL
MYELOCYTES NFR BLD MANUAL: 1 %
NEUTROPHILS # BLD MANUAL: 9.1 10E3/UL (ref 1.6–8.3)
NEUTROPHILS NFR BLD MANUAL: 97 %
O2/TOTAL GAS SETTING VFR VENT: 2 %
O2/TOTAL GAS SETTING VFR VENT: 2 %
OXYHGB MFR BLDV: 51 % (ref 70–75)
PCO2 BLD: 36 MM HG (ref 35–45)
PCO2 BLDV: 46 MM HG (ref 40–50)
PH BLD: 7.44 [PH] (ref 7.35–7.45)
PH BLDV: 7.37 [PH] (ref 7.32–7.43)
PHOSPHATE SERPL-MCNC: 3.2 MG/DL (ref 2.5–4.5)
PHOSPHATE SERPL-MCNC: 3.2 MG/DL (ref 2.5–4.5)
PHOSPHATE SERPL-MCNC: 4.1 MG/DL (ref 2.5–4.5)
PLAT MORPH BLD: ABNORMAL
PLATELET # BLD AUTO: 110 10E3/UL (ref 150–450)
PLATELET # BLD AUTO: 97 10E3/UL (ref 150–450)
PO2 BLD: 112 MM HG (ref 80–105)
PO2 BLDV: 31 MM HG (ref 25–47)
POLYCHROMASIA BLD QL SMEAR: SLIGHT
POTASSIUM SERPL-SCNC: 3.7 MMOL/L (ref 3.4–5.3)
POTASSIUM SERPL-SCNC: 3.7 MMOL/L (ref 3.4–5.3)
POTASSIUM SERPL-SCNC: 3.8 MMOL/L (ref 3.4–5.3)
PROT SERPL-MCNC: 4.7 G/DL (ref 6.4–8.3)
RBC # BLD AUTO: 2.35 10E6/UL (ref 4.4–5.9)
RBC # BLD AUTO: 3.07 10E6/UL (ref 4.4–5.9)
RBC MORPH BLD: ABNORMAL
SODIUM SERPL-SCNC: 135 MMOL/L (ref 136–145)
SODIUM SERPL-SCNC: 137 MMOL/L (ref 136–145)
SODIUM SERPL-SCNC: 137 MMOL/L (ref 136–145)
SPECIMEN EXPIRATION DATE: NORMAL
TACROLIMUS BLD-MCNC: 7.1 UG/L (ref 5–15)
TME LAST DOSE: NORMAL H
TME LAST DOSE: NORMAL H
UFH PPP CHRO-ACNC: <0.1 IU/ML
UFH PPP CHRO-ACNC: <0.1 IU/ML
UNIT ABO/RH: NORMAL
UNIT NUMBER: NORMAL
UNIT STATUS: NORMAL
UNIT TYPE ISBT: 5100
VANCOMYCIN SERPL-MCNC: 15.8 UG/ML
WBC # BLD AUTO: 11.9 10E3/UL (ref 4–11)
WBC # BLD AUTO: 9.4 10E3/UL (ref 4–11)

## 2023-06-15 PROCEDURE — 85018 HEMOGLOBIN: CPT | Performed by: STUDENT IN AN ORGANIZED HEALTH CARE EDUCATION/TRAINING PROGRAM

## 2023-06-15 PROCEDURE — 82330 ASSAY OF CALCIUM: CPT | Performed by: SURGERY

## 2023-06-15 PROCEDURE — 250N000011 HC RX IP 250 OP 636: Performed by: SURGERY

## 2023-06-15 PROCEDURE — 85610 PROTHROMBIN TIME: CPT | Performed by: SURGERY

## 2023-06-15 PROCEDURE — 258N000003 HC RX IP 258 OP 636: Performed by: TRANSPLANT SURGERY

## 2023-06-15 PROCEDURE — 84075 ASSAY ALKALINE PHOSPHATASE: CPT | Performed by: SURGERY

## 2023-06-15 PROCEDURE — 86923 COMPATIBILITY TEST ELECTRIC: CPT

## 2023-06-15 PROCEDURE — 250N000013 HC RX MED GY IP 250 OP 250 PS 637: Performed by: PHYSICIAN ASSISTANT

## 2023-06-15 PROCEDURE — 250N000012 HC RX MED GY IP 250 OP 636 PS 637: Performed by: INTERNAL MEDICINE

## 2023-06-15 PROCEDURE — 250N000013 HC RX MED GY IP 250 OP 250 PS 637: Performed by: STUDENT IN AN ORGANIZED HEALTH CARE EDUCATION/TRAINING PROGRAM

## 2023-06-15 PROCEDURE — 80197 ASSAY OF TACROLIMUS: CPT | Performed by: NURSE PRACTITIONER

## 2023-06-15 PROCEDURE — 83735 ASSAY OF MAGNESIUM: CPT | Performed by: INTERNAL MEDICINE

## 2023-06-15 PROCEDURE — 84100 ASSAY OF PHOSPHORUS: CPT | Performed by: INTERNAL MEDICINE

## 2023-06-15 PROCEDURE — 86850 RBC ANTIBODY SCREEN: CPT

## 2023-06-15 PROCEDURE — 999N000157 HC STATISTIC RCP TIME EA 10 MIN

## 2023-06-15 PROCEDURE — 258N000003 HC RX IP 258 OP 636: Performed by: SURGERY

## 2023-06-15 PROCEDURE — 250N000009 HC RX 250: Performed by: TRANSPLANT SURGERY

## 2023-06-15 PROCEDURE — 82247 BILIRUBIN TOTAL: CPT | Performed by: SURGERY

## 2023-06-15 PROCEDURE — 85730 THROMBOPLASTIN TIME PARTIAL: CPT

## 2023-06-15 PROCEDURE — 84155 ASSAY OF PROTEIN SERUM: CPT | Performed by: SURGERY

## 2023-06-15 PROCEDURE — 71045 X-RAY EXAM CHEST 1 VIEW: CPT | Mod: 26 | Performed by: RADIOLOGY

## 2023-06-15 PROCEDURE — 250N000013 HC RX MED GY IP 250 OP 250 PS 637: Performed by: NURSE PRACTITIONER

## 2023-06-15 PROCEDURE — 83605 ASSAY OF LACTIC ACID: CPT | Performed by: STUDENT IN AN ORGANIZED HEALTH CARE EDUCATION/TRAINING PROGRAM

## 2023-06-15 PROCEDURE — 85520 HEPARIN ASSAY: CPT | Performed by: PHYSICIAN ASSISTANT

## 2023-06-15 PROCEDURE — 250N000012 HC RX MED GY IP 250 OP 636 PS 637: Performed by: PHYSICIAN ASSISTANT

## 2023-06-15 PROCEDURE — 93970 EXTREMITY STUDY: CPT

## 2023-06-15 PROCEDURE — 80202 ASSAY OF VANCOMYCIN: CPT | Performed by: TRANSPLANT SURGERY

## 2023-06-15 PROCEDURE — 999N000065 XR CHEST PORT 1 VIEW

## 2023-06-15 PROCEDURE — 250N000009 HC RX 250: Performed by: INTERNAL MEDICINE

## 2023-06-15 PROCEDURE — 82330 ASSAY OF CALCIUM: CPT | Performed by: INTERNAL MEDICINE

## 2023-06-15 PROCEDURE — 250N000011 HC RX IP 250 OP 636: Performed by: TRANSPLANT SURGERY

## 2023-06-15 PROCEDURE — 82803 BLOOD GASES ANY COMBINATION: CPT

## 2023-06-15 PROCEDURE — 99291 CRITICAL CARE FIRST HOUR: CPT | Performed by: STUDENT IN AN ORGANIZED HEALTH CARE EDUCATION/TRAINING PROGRAM

## 2023-06-15 PROCEDURE — 93971 EXTREMITY STUDY: CPT

## 2023-06-15 PROCEDURE — 250N000013 HC RX MED GY IP 250 OP 250 PS 637: Performed by: TRANSPLANT SURGERY

## 2023-06-15 PROCEDURE — 94668 MNPJ CHEST WALL SBSQ: CPT

## 2023-06-15 PROCEDURE — 85520 HEPARIN ASSAY: CPT

## 2023-06-15 PROCEDURE — 85384 FIBRINOGEN ACTIVITY: CPT | Performed by: PHYSICIAN ASSISTANT

## 2023-06-15 PROCEDURE — 86901 BLOOD TYPING SEROLOGIC RH(D): CPT

## 2023-06-15 PROCEDURE — P9016 RBC LEUKOCYTES REDUCED: HCPCS

## 2023-06-15 PROCEDURE — 85027 COMPLETE CBC AUTOMATED: CPT | Performed by: SURGERY

## 2023-06-15 PROCEDURE — 85027 COMPLETE CBC AUTOMATED: CPT | Performed by: INTERNAL MEDICINE

## 2023-06-15 PROCEDURE — 71045 X-RAY EXAM CHEST 1 VIEW: CPT

## 2023-06-15 PROCEDURE — 85007 BL SMEAR W/DIFF WBC COUNT: CPT | Performed by: SURGERY

## 2023-06-15 PROCEDURE — 82248 BILIRUBIN DIRECT: CPT | Performed by: SURGERY

## 2023-06-15 PROCEDURE — 250N000012 HC RX MED GY IP 250 OP 636 PS 637: Performed by: STUDENT IN AN ORGANIZED HEALTH CARE EDUCATION/TRAINING PROGRAM

## 2023-06-15 PROCEDURE — 99233 SBSQ HOSP IP/OBS HIGH 50: CPT | Mod: 24 | Performed by: INTERNAL MEDICINE

## 2023-06-15 PROCEDURE — 250N000013 HC RX MED GY IP 250 OP 250 PS 637: Performed by: SURGERY

## 2023-06-15 PROCEDURE — 92526 ORAL FUNCTION THERAPY: CPT | Mod: GN

## 2023-06-15 PROCEDURE — 200N000002 HC R&B ICU UMMC

## 2023-06-15 PROCEDURE — 82805 BLOOD GASES W/O2 SATURATION: CPT

## 2023-06-15 PROCEDURE — 93970 EXTREMITY STUDY: CPT | Mod: 26 | Performed by: RADIOLOGY

## 2023-06-15 PROCEDURE — 93971 EXTREMITY STUDY: CPT | Mod: 26 | Performed by: RADIOLOGY

## 2023-06-15 RX ORDER — CALCIUM CHLORIDE, MAGNESIUM CHLORIDE, SODIUM CHLORIDE, SODIUM BICARBONATE, POTASSIUM CHLORIDE AND SODIUM PHOSPHATE DIBASIC DIHYDRATE 3.68; 3.05; 6.34; 3.09; .314; .187 G/L; G/L; G/L; G/L; G/L; G/L
12.5 INJECTION INTRAVENOUS CONTINUOUS
Status: DISCONTINUED | OUTPATIENT
Start: 2023-06-15 | End: 2023-06-19

## 2023-06-15 RX ORDER — CALCIUM GLUCONATE 20 MG/ML
2 INJECTION, SOLUTION INTRAVENOUS EVERY 8 HOURS PRN
Status: DISCONTINUED | OUTPATIENT
Start: 2023-06-15 | End: 2023-06-15

## 2023-06-15 RX ORDER — MAGNESIUM SULFATE HEPTAHYDRATE 40 MG/ML
2 INJECTION, SOLUTION INTRAVENOUS EVERY 8 HOURS PRN
Status: DISCONTINUED | OUTPATIENT
Start: 2023-06-15 | End: 2023-06-15

## 2023-06-15 RX ORDER — LIDOCAINE 40 MG/G
CREAM TOPICAL
Status: ACTIVE | OUTPATIENT
Start: 2023-06-15 | End: 2023-06-18

## 2023-06-15 RX ORDER — CALCIUM CHLORIDE, MAGNESIUM CHLORIDE, SODIUM CHLORIDE, SODIUM BICARBONATE, POTASSIUM CHLORIDE AND SODIUM PHOSPHATE DIBASIC DIHYDRATE 3.68; 3.05; 6.34; 3.09; .314; .187 G/L; G/L; G/L; G/L; G/L; G/L
INJECTION INTRAVENOUS CONTINUOUS
Status: DISCONTINUED | OUTPATIENT
Start: 2023-06-15 | End: 2023-06-19

## 2023-06-15 RX ORDER — ATORVASTATIN CALCIUM 20 MG/1
20 TABLET, FILM COATED ORAL EVERY EVENING
Status: DISCONTINUED | OUTPATIENT
Start: 2023-06-15 | End: 2023-06-18

## 2023-06-15 RX ORDER — POTASSIUM CHLORIDE 29.8 MG/ML
20 INJECTION INTRAVENOUS EVERY 8 HOURS PRN
Status: DISCONTINUED | OUTPATIENT
Start: 2023-06-15 | End: 2023-06-15

## 2023-06-15 RX ORDER — MIDODRINE HYDROCHLORIDE 5 MG/1
10 TABLET ORAL
Status: DISCONTINUED | OUTPATIENT
Start: 2023-06-15 | End: 2023-06-16

## 2023-06-15 RX ADMIN — CALCIUM CHLORIDE, MAGNESIUM CHLORIDE, DEXTROSE MONOHYDRATE, LACTIC ACID, SODIUM CHLORIDE, SODIUM BICARBONATE AND POTASSIUM CHLORIDE 12.5 ML/KG/HR: 5.15; 2.03; 22; 5.4; 6.46; 3.09; .157 INJECTION INTRAVENOUS at 00:39

## 2023-06-15 RX ADMIN — MIDODRINE HYDROCHLORIDE 10 MG: 5 TABLET ORAL at 08:12

## 2023-06-15 RX ADMIN — TACROLIMUS 1.5 MG: 5 CAPSULE ORAL at 17:44

## 2023-06-15 RX ADMIN — CALCIUM CHLORIDE, MAGNESIUM CHLORIDE, SODIUM CHLORIDE, SODIUM BICARBONATE, POTASSIUM CHLORIDE AND SODIUM PHOSPHATE DIBASIC DIHYDRATE 12.5 ML/KG/HR: 3.68; 3.05; 6.34; 3.09; .314; .187 INJECTION INTRAVENOUS at 00:39

## 2023-06-15 RX ADMIN — URSODIOL 250 MG: 250 TABLET, FILM COATED ORAL at 20:21

## 2023-06-15 RX ADMIN — URSODIOL 250 MG: 250 TABLET, FILM COATED ORAL at 08:12

## 2023-06-15 RX ADMIN — SODIUM BICARBONATE 650 MG: 650 TABLET ORAL at 20:21

## 2023-06-15 RX ADMIN — INSULIN ASPART 1 UNITS: 100 INJECTION, SOLUTION INTRAVENOUS; SUBCUTANEOUS at 10:37

## 2023-06-15 RX ADMIN — Medication 40 MG: at 07:50

## 2023-06-15 RX ADMIN — CALCIUM CHLORIDE, MAGNESIUM CHLORIDE, SODIUM CHLORIDE, SODIUM BICARBONATE, POTASSIUM CHLORIDE AND SODIUM PHOSPHATE DIBASIC DIHYDRATE 12.5 ML/KG/HR: 3.68; 3.05; 6.34; 3.09; .314; .187 INJECTION INTRAVENOUS at 19:02

## 2023-06-15 RX ADMIN — SODIUM BICARBONATE 650 MG: 650 TABLET ORAL at 13:55

## 2023-06-15 RX ADMIN — INSULIN ASPART 1 UNITS: 100 INJECTION, SOLUTION INTRAVENOUS; SUBCUTANEOUS at 17:45

## 2023-06-15 RX ADMIN — CALCIUM CHLORIDE, MAGNESIUM CHLORIDE, SODIUM CHLORIDE, SODIUM BICARBONATE, POTASSIUM CHLORIDE AND SODIUM PHOSPHATE DIBASIC DIHYDRATE 12.5 ML/KG/HR: 3.68; 3.05; 6.34; 3.09; .314; .187 INJECTION INTRAVENOUS at 20:39

## 2023-06-15 RX ADMIN — MYCOPHENOLATE MOFETIL 750 MG: 200 POWDER, FOR SUSPENSION ORAL at 17:44

## 2023-06-15 RX ADMIN — METOPROLOL TARTRATE 25 MG: 25 TABLET, FILM COATED ORAL at 13:56

## 2023-06-15 RX ADMIN — MIDODRINE HYDROCHLORIDE 10 MG: 5 TABLET ORAL at 17:44

## 2023-06-15 RX ADMIN — CALCIUM CHLORIDE, MAGNESIUM CHLORIDE, DEXTROSE MONOHYDRATE, LACTIC ACID, SODIUM CHLORIDE, SODIUM BICARBONATE AND POTASSIUM CHLORIDE 12.5 ML/KG/HR: 5.15; 2.03; 22; 5.4; 6.46; 3.09; .157 INJECTION INTRAVENOUS at 15:05

## 2023-06-15 RX ADMIN — CALCIUM CHLORIDE, MAGNESIUM CHLORIDE, DEXTROSE MONOHYDRATE, LACTIC ACID, SODIUM CHLORIDE, SODIUM BICARBONATE AND POTASSIUM CHLORIDE 12.5 ML/KG/HR: 5.15; 2.03; 22; 5.4; 6.46; 3.09; .157 INJECTION INTRAVENOUS at 07:48

## 2023-06-15 RX ADMIN — VANCOMYCIN HYDROCHLORIDE 1500 MG: 10 INJECTION, POWDER, LYOPHILIZED, FOR SOLUTION INTRAVENOUS at 20:22

## 2023-06-15 RX ADMIN — INSULIN ASPART 1 UNITS: 100 INJECTION, SOLUTION INTRAVENOUS; SUBCUTANEOUS at 11:45

## 2023-06-15 RX ADMIN — CALCIUM CHLORIDE, MAGNESIUM CHLORIDE, SODIUM CHLORIDE, SODIUM BICARBONATE, POTASSIUM CHLORIDE AND SODIUM PHOSPHATE DIBASIC DIHYDRATE 12.5 ML/KG/HR: 3.68; 3.05; 6.34; 3.09; .314; .187 INJECTION INTRAVENOUS at 11:32

## 2023-06-15 RX ADMIN — TACROLIMUS 1.5 MG: 5 CAPSULE ORAL at 08:12

## 2023-06-15 RX ADMIN — MYCOPHENOLATE MOFETIL 750 MG: 200 POWDER, FOR SUSPENSION ORAL at 07:52

## 2023-06-15 RX ADMIN — MIDODRINE HYDROCHLORIDE 10 MG: 5 TABLET ORAL at 12:41

## 2023-06-15 RX ADMIN — PREDNISONE 10 MG: 10 TABLET ORAL at 08:12

## 2023-06-15 RX ADMIN — MICAFUNGIN SODIUM 100 MG: 50 INJECTION, POWDER, LYOPHILIZED, FOR SOLUTION INTRAVENOUS at 01:02

## 2023-06-15 RX ADMIN — CALCIUM CHLORIDE, MAGNESIUM CHLORIDE, DEXTROSE MONOHYDRATE, LACTIC ACID, SODIUM CHLORIDE, SODIUM BICARBONATE AND POTASSIUM CHLORIDE 12.5 ML/KG/HR: 5.15; 2.03; 22; 5.4; 6.46; 3.09; .157 INJECTION INTRAVENOUS at 11:32

## 2023-06-15 RX ADMIN — CALCIUM CHLORIDE, MAGNESIUM CHLORIDE, SODIUM CHLORIDE, SODIUM BICARBONATE, POTASSIUM CHLORIDE AND SODIUM PHOSPHATE DIBASIC DIHYDRATE 12.5 ML/KG/HR: 3.68; 3.05; 6.34; 3.09; .314; .187 INJECTION INTRAVENOUS at 15:05

## 2023-06-15 RX ADMIN — CALCIUM CHLORIDE, MAGNESIUM CHLORIDE, SODIUM CHLORIDE, SODIUM BICARBONATE, POTASSIUM CHLORIDE AND SODIUM PHOSPHATE DIBASIC DIHYDRATE 12.5 ML/KG/HR: 3.68; 3.05; 6.34; 3.09; .314; .187 INJECTION INTRAVENOUS at 04:21

## 2023-06-15 RX ADMIN — CALCIUM ACETATE 667 MG: 667 CAPSULE ORAL at 05:33

## 2023-06-15 RX ADMIN — INSULIN ASPART 1 UNITS: 100 INJECTION, SOLUTION INTRAVENOUS; SUBCUTANEOUS at 21:23

## 2023-06-15 RX ADMIN — CALCIUM CHLORIDE, MAGNESIUM CHLORIDE, SODIUM CHLORIDE, SODIUM BICARBONATE, POTASSIUM CHLORIDE AND SODIUM PHOSPHATE DIBASIC DIHYDRATE 12.5 ML/KG/HR: 3.68; 3.05; 6.34; 3.09; .314; .187 INJECTION INTRAVENOUS at 07:48

## 2023-06-15 RX ADMIN — CALCIUM ACETATE 667 MG: 667 CAPSULE ORAL at 01:02

## 2023-06-15 RX ADMIN — Medication 5 ML: at 07:50

## 2023-06-15 RX ADMIN — SODIUM BICARBONATE 650 MG: 650 TABLET ORAL at 08:12

## 2023-06-15 RX ADMIN — VALGANCICLOVIR HYDROCHLORIDE 450 MG: 450 TABLET ORAL at 20:21

## 2023-06-15 RX ADMIN — NOREPINEPHRINE BITARTRATE 0.03 MCG/KG/MIN: 1 INJECTION, SOLUTION, CONCENTRATE INTRAVENOUS at 11:04

## 2023-06-15 RX ADMIN — CALCIUM CHLORIDE, MAGNESIUM CHLORIDE, SODIUM CHLORIDE, SODIUM BICARBONATE, POTASSIUM CHLORIDE AND SODIUM PHOSPHATE DIBASIC DIHYDRATE: 3.68; 3.05; 6.34; 3.09; .314; .187 INJECTION INTRAVENOUS at 16:59

## 2023-06-15 RX ADMIN — CALCIUM CHLORIDE, MAGNESIUM CHLORIDE, DEXTROSE MONOHYDRATE, LACTIC ACID, SODIUM CHLORIDE, SODIUM BICARBONATE AND POTASSIUM CHLORIDE 12.5 ML/KG/HR: 5.15; 2.03; 22; 5.4; 6.46; 3.09; .157 INJECTION INTRAVENOUS at 04:21

## 2023-06-15 RX ADMIN — ATORVASTATIN CALCIUM 20 MG: 20 TABLET, FILM COATED ORAL at 20:22

## 2023-06-15 RX ADMIN — CALCIUM CHLORIDE, MAGNESIUM CHLORIDE, SODIUM CHLORIDE, SODIUM BICARBONATE, POTASSIUM CHLORIDE AND SODIUM PHOSPHATE DIBASIC DIHYDRATE 12.5 ML/KG/HR: 3.68; 3.05; 6.34; 3.09; .314; .187 INJECTION INTRAVENOUS at 16:55

## 2023-06-15 RX ADMIN — ASPIRIN 325 MG: 325 TABLET ORAL at 08:12

## 2023-06-15 ASSESSMENT — ACTIVITIES OF DAILY LIVING (ADL)
ADLS_ACUITY_SCORE: 40

## 2023-06-15 NOTE — PROGRESS NOTES
Patient is lethargic, confused, mentation improving throughout night, occasionally follows commands, constantly talking about food, and making incomprehensible sounds, pupils 2-3; afibrillation, -120s, levo titrated to keep MAP >60 with goal of I=O on CRRT; on RA, upper airway secretions, able to cough and clear some; TF at goal of 40 mL/hour; stauffer with no UOP, leaking around stauffer, stauffer irrigated with no success, stauffer replaced, UOP adequate, on CRRT I=O; heparin gtt at 600 units/hour, continue with current plan of care.    Joann Hernandez RN

## 2023-06-15 NOTE — PROGRESS NOTES
SURGICAL ICU PROGRESS NOTE  Consult/Transfer Note   06/15/2023      PRIMARY TEAM: Transplant Surgery  PRIMARY PHYSICIAN: Dr. Clifton  REASON FOR ICU ADMISSION/CONSULT: Close hemodynamic monitoring, CRRT  ADMITTING PHYSICIAN: Dr. Nowak   Date of Service (when I saw the patient): 06/15/2023    ASSESSMENT:  Damion Quinones is a 67 year old male with a past medical history significant for decompensated alcohol related + hereditary hemochromatosis (homozygous H63D) cirrhosis complicated by variceal bleeding s/p TIPS (10/1/2022) and hepatic encephalopathy. PMHx also includes coronary artery disease, alcohol overuse, obesity, ESRD on HD -- (IgA nephropathy + ANCA vasculitis), psoriatic arthritis, paroxysmal atrial fibrillation (on warfarin), DVT, recurrent c diff, and HTN.  He is now s/p  donor liver and kidney transplant on 23 with Dr. Clifton. He was discharged from the SICU on  and readmitted on  due to the need for CRRT per nephrology.     PLAN:    Updates today (06/15/2023)  - Upper and lower extremity DVT US -> resulted with large thrombus in RIJ and no DVTs   - Hold phosphate binder (calcium acetate) while on CRRT  - Increase midodrine to 10 mg TID  - Increase heparin drip to 700 units/hr -> increased to 800 units/hr by Transplant  - Goal Xa value 0.15-0.2    - Check Xa at 3 pm   - Repeat swallow evaluation   - Place internal jugular CVC   - Continue pulmonary toileting  - Continue to wean pressors      Neurological:  # Acute post-surgical pain   # Hx Alcohol use disorder, sober since 2016  - Monitor neurological status. Delirium preventions and precautions  - Sedation plan: None indicated  - Pain control: PRN oxycodone 2.5-5 mg q4h, robaxin 500 q4h PRN  # Aphasia, right sided gaze preference, inability to follow commands, resolved  # Encephalopathy  - Stroke code called ~0686   - Appreciate neurology evaluation and assistance  - Head CT w/o contrast with no acute  intracranial pathology   - MRA head/neck (w/o contrast) with no acute pathology related to presentation  - vEEG showing encephalopathy and negative for epileptiform activity   - Per discussion with patient's wife, symptoms are consistent with prior episodes of encephalopathy with increased ammonia levels (including gaze preference). Ammonia 18 6/12.   - Neurologic status improving, continue with stimulation and activity. If no improvement with resolution of uremia, consider replacing tacrolimus with cyclosporine.    - BUN 73.3 (96.9)    Pulmonary:   # Post operative ventilatory support, resolved  # Acute hypoxic respiratory failure requiring mechanical ventilation, resolved  # Bilateral small pleural effusions  - Successfully extubated 6/7 to NC. Maintaining oxygen saturations on room air.   - Aggressive pulmonary hygiene, IS, encourage OOB  - Supplemental O2 as needed to maintain SpO2 > 92%  - Continue chest physiotherapy   - CXR stable     Cardiovascular:    #Hx pAfib on PTA warfarin  # Afib with RVR, improving  #Hx CAD  #Hx HTN  #Hypotension, orthostatic  #Shock, likely septic- improving  - ECHO 6/5/23: afib, LVEF 55-60%  - MAP goal 60-85, SBP goal 110-160. 6/10 peripheral levophed started for persistent hypotension, off since 0100 6/12. Restarted 8 pm on 6/14. Wean as able.   - Increase midodrine 10 mg TID restarted   - PTA metoprolol succinate ER 25 daily. Increased to 25mg q8h on 6/13.   - metoprolol 5 mg IV PRN for sustained tachycardia >120  - Continue to hold PTA warfarin  - continue ASA 81 mg daily, PTA atorvastatin hold until outpatient  - Cardiology consulted; Signed off 6/11    -- TTE completed, EF 55-60%, normal ventricular function   -- Allow for permissive tachycardia, long term goal HR < 110, okay for spikes to 120-130s   -- Anticoagulation for Afib when safe from a surgical standpoint,    -- Heparin gtt post IR procedure per transplant; Start at 600 u/hr non-titratable per transplant attending.  Increased to 700 units/hour today.    Gastroenterology/Nutrition:  # Post-operative ileus, resolved  # At risk for malnutrition  # Hx of decompensated alcohol related + hereditary hemochromatosis (homozygous H63D) cirrhosis (MELD-Na 30) complicated by variceal bleeding s/p TIPS (10/1/2022) and hepatic encephalopathy now s/p DDLT   # Direct hyperbilirubinemia  -  repeat liver US: persistent low resistance waveforms and low resistive indices throughout hepatic artery system concerning for ischemia, stable velocities and upstroke  - LFTS overall trending down, TBili 2.2 (2.1), dBili 1.89 (1.83), alk phos 235 (206), AST 72 (66),  (148)  - MAE drain with serosanguinous output. Output in last 24 hours: 91 cc   -  mg daily   - Ursodiol 250 mg BID   - Post-pyloric feeding tube placed by radiology   - Nutrition consulted and following, appreciate input. Trickle feeds started via PPFT , now at goal of 40 ml/hour.  Continue TF at goal    - Bowel regimen, senna PRN   - PPI (Protonix)  - SLP evaluation  with NPO recommendations. Reassess today.    Renal/ Fluids/Electrolytes:   #Lactic acidosis, resolved  # Hx ESRD on HD MWF (IgA nephropathy + ANCA vasculitis) now s/p DDKT 23  # Delayed graft function, improving  - Expect some delayed graft function with  donor kidney, gradually increasing urine output over the past few days, responsive to diuretics  - transplant nephrology consulted and following, appreciate expertise  - Kidney ultrasound  with patent vessels. Renal ultrasound  without stenosis.    - CRRT discontinued 6/10, dialysis line removed due to positive blood cultures. Nephrology recommending CRRT be resumed () instead of HD due to tenous hemodynamics. IR placed line. Currently running CRRT with Is equal Os.   - Lux in place, continue for close UOP monitoring  - No diureses today. 483 ml of UOP since midnight.   - Cr 3.83> 4.16> 4.77> 4.95> 5.53>4.51> 3.14   #  hyperphosphatemia  - Phos 5.7> 7.7> 8.7> 8.5> 8.2> 5.5> 4.1  # hypocalcemia  - ionized Ca 4.2> 4.3> 4.3> 4.1> 4.6  # hypermagnesemia    - Mg 2.9> 2.8> 2.9> 2.2  # Hyperbicarbonatemia, metabolic acidosis  - Worsening bicarb levels last several days. CO2 18 today.   - Sodium bicarb 650 mg TID started per nephrology 6/14      Endocrine:  # Stress hyperglycemia, improving  -Currently on high correction ISS. Goal to keep BG< 180 for optimal wound healing      ID:  # Stress leukocytosis, improving  # Post-transplant ppx per transplant  - WBC 11.9 from 8.6  -Perioperative antibiotics: Zosyn x 48 hrs (completed 6/8), micafungin x 14 days  -Immunosuppression per transplant: no further doses of thymoglobulin, 1.5 mg BID oral tacrolimus, 10 mg oral prednisone taper to 5 mg for chronic use,  mg BID  -Prophylactic regimen: Valganciclovir, Bactrim Ppx   # Enterococcus bacteremia  # Septic shock, resolved  - 6/9 blood cultures x2 growing E. Faecium. 6/10, 6/11, 6/12 blood cx NGTD.   - Transplant ID consulted and following, appreciate recs  - Abx: linezolid and Zosyn stopped (610-6/12). Vancomycin started 6/12. Continue per ID recommendations.   - Daily blood cultures until clear. Stopped daily blood cultures 6/13    Heme:     # Acute blood loss anemia   # Anemia of critical illness and chronic renal disease  # Hx hereditary hemochromatosis   # Thrombocytopenia 2/2 liver dysfunction  - Transfusion goals: INR <2, Platelets > 20, Fibrinogen > 200, Hgb >8.0  - Hgb 9.3, Plt 97, INR 1.03  - Restarted heparin drip 6/14, currently at 700 U/hr   - Recheck Xa level at 1500   - Upper and Lower extremity DVT US per IR due to possible thrombus seen in RIJ during procedure yesterday    Rheumatologic:  #Hx Gout  - Hold PTA prednisone 10mg daily  - Transitioning methylprednisolone 8 mg taper back to prednisone 10 mg 6/13 per transplant. Taper to 5 mg for chronic use.     Musculoskeletal:  # Weakness and deconditioning of critical illness    - Physical and occupational therapy consult, appreciate recommendations. Encourage increased time OOB when mental status appropriate.     Skin:  -Diligent skin care to prevent injury    General Cares/Prophylaxis:    DVT Prophylaxis: Heparin gtt, SCDs  GI Prophylaxis: Protonix BID  Restraints: Restraints for medical healing needed: No    Lines/ tubes/ drains:  - PIVs  - Lux catheter  - Intraabdominal MAE drain x1   - Axillary arterial line  - CVC REJ   - NJ   - Place PICC line today    Disposition:  - Surgical ICU    Discussed with SICU staff on multidisciplinary team rounds    Leatha Solano, MS4     I was present with the medical student who participated in the service and in the documentation of the note. I have verified the history and personally performed the physical exam and medical decision making. I agree with the assessment and plan of care as documented in the note.    I personally managed encephalopathy, intake/ouput evaluation and planning, CRRT management, electrolyte derangements, hemodynamics and anticoagulation planning. I spent a total of 30 minutes providing critical care services at the bedside, and on the critical care unit, evaluating the patient, directing care and reviewing laboratory values and radiologic reports for the patient.      Leonel Streeter PA-C  06/15/23  3:14 PM  - - - - - - - - - - - - - - - - - - - - - - - - - - - - - - - - - - - - - - - - - - - - - -   INTERVAL EVENTS/SUBJECTIVE:     Lux changed overnight due to leaking and minimal output. After replacement there was adequate urine output. Afebrile. Improving lethargy and mentation. Restarted pressors with CRRT initiation.     Patient was alert and interactive this morning. Denied pain and shortness of breath. Unable to complete full ROS due to patient confusion and at times incomprehensible.     PHYSICAL EXAMINATION:  Temp:  [96.8  F (36  C)-98.2  F (36.8  C)] 98.2  F (36.8  C)  Pulse:  [] 102  Resp:  [11-26]  24  BP: (97-99)/(75-76) 97/75  MAP:  [53 mmHg-272 mmHg] 63 mmHg  Arterial Line BP: ()/() 83/52  SpO2:  [87 %-100 %] 99 %     General: NAD, appears comfortable lying in bed  HEENT: NG and NJ secure in place. PERRL. Jaundice appearance, scleral icterus- decreased.   Pulm/Resp: Breathing unlabored on room air, equal chest rise, no audible wheezing. Lung sounds clear bilaterally with exam complicated by patient talking. Secretions in upper airway.   CV: irregularly irregular rhythm on monitor, normal rate  Abdomen: distended, chevron incision without drainage, RLQ prevena holding suctoin, MAE x 1 to bulb suction with serosanguinous bile tinged output, soft reducible umbilical hernia. Bowel sounds present. No pain with palpation.   : stauffer catheter in place with minimal pavel urine output.   MSK/Extremities: peripheral edema, peripheral pulses intact.   Skin: scattered purpura throughout face, chest, abdomen, and arms, jaundiced- decreased, no erythema or streaking   Neuro: Alert, oriented to self, not oriented to time, place, or situation. Answers questions. Follows commands for neuro exam. Cannot give thumbs up, wiggle toes, or  foot flexion/extension.      LABS: Reviewed.   Arterial Blood Gases   Recent Labs   Lab 06/12/23  0535 06/11/23  1833 06/11/23  1244 06/10/23  1807   PH 7.34* 7.34* 7.34* 7.37   PCO2 38 39 39 42   PO2 176* 128* 124* 107*   HCO3 20* 21 21 24     Complete Blood Count   Recent Labs   Lab 06/15/23  1149 06/15/23  0417 06/14/23  0357 06/13/23  0351   WBC 11.9* 9.4 7.6 8.6   HGB 7.4* 9.3* 8.2* 8.0*   * 97* 89* 55*     Basic Metabolic Panel  Recent Labs   Lab 06/15/23  1149 06/15/23  1145 06/15/23  0830 06/15/23  0418 06/15/23  0417 06/15/23  0056 06/14/23 2118 06/14/23  0404 06/14/23  0357     --   --   --  137  --  137  --  135*   POTASSIUM 3.8  --   --   --  3.7  --  4.2  --  4.6   CHLORIDE 103  --   --   --  103  --  100  --  98   CO2 21*  --   --   --  18*  --  16*  --   16*   BUN 57.1*  --   --   --  73.3*  --  96.9*  --  111.0*   CR 2.33*  --   --   --  3.14*  --  4.51*  --  5.53*   * 186* 147* 138* 151*   < > 121*  132*   < > 130*    < > = values in this interval not displayed.     Liver Function Tests  Recent Labs   Lab 06/15/23  1149 06/15/23  0417 06/14/23 2118 06/14/23 0357 06/13/23 0351 06/12/23  0354   AST  --  72*  --  66* 61* 75*   ALT  --  130*  --  148* 158* 186*   ALKPHOS  --  235*  --  206* 138* 119   BILITOTAL  --  2.2*  --  2.1* 2.3* 3.0*   ALBUMIN 1.9* 2.2* 2.2* 2.3* 2.4* 2.3*   INR  --  1.03  --  1.06 1.23* 1.27*     Pancreatic Enzymes  No lab results found in last 7 days.  Coagulation Profile  Recent Labs   Lab 06/15/23  0842 06/15/23  0417 06/14/23 2118 06/14/23 0357 06/13/23 0351 06/12/23  0354   INR  --  1.03  --  1.06 1.23* 1.27*   PTT 29 30 30 26 27 30       IMAGING:  Recent Results (from the past 24 hour(s))   IR CVC Non Tunnel Placement > 5 Yrs    Narrative    PROCEDURE: Non-tunneled central venous catheter placement    Procedural Personnel  Attending physician(s): ERYN Welch MD   Fellow physician(s): LOVE Joel MD  Resident physician(s): None  Advanced practice provider(s): None    Procedure Date (mm/dd/yyyy): 6/14/2023    Pre-procedure diagnosis: Delayed graft function  Post-procedure diagnosis: Same  Indication: Performance of hemodialysis  Additional clinical history: None    Complications: No immediate complications.      Impression    IMPRESSION:    Insertion of right-sided external jugular non-tunneled dual-lumen  temporary dialysis catheter, with tip in the expected location of the  cavoatrial junction. Access was initially attempted in right IJ  however vein appears questionably partially thrombosed.     Plan:     The catheter may be used immediately.  _______________________________________________________________    PROCEDURE SUMMARY:  - Venous access with ultrasound guidance  - Non-tunneled central venous catheter insertion with  fluoroscopic  guidance  - Additional procedure(s): None    PROCEDURE DETAILS:    Pre-procedure  Consent: Informed consent for the procedure including risks, benefits  and alternatives was obtained and time-out was performed prior to the  procedure.  Preparation (MIPS): The site was prepared and draped using all  elements of maximal sterile barrier technique including sterile  gloves, sterile gown, cap, mask, large sterile sheet, sterile  ultrasound probe cover, hand hygiene and cutaneous antisepsis with 2%  chlorhexidine.   Medical reason for site preparation exception (MIPS): Not applicable    Anesthesia/sedation  Level of anesthesia/sedation: Moderate sedation (conscious sedation)  Anesthesia/sedation administered by: Independent trained observer  under attending supervision with continuous monitoring of the  patient?s level of consciousness and physiologic status  Total intra-service sedation time (minutes): 38    Access  Local anesthesia was administered. Initially right IJ was evaluated  which appears only partially compressible, however overall poor  visualizaton. Needle access obtained under ultrasound guidance however  unable to advance wire. Ultimately decision made to access R EJ. The  vessel was sonographically evaluated and determined to be patent. Real  time ultrasound was used to visualize needle entry into the vessel and  a permanent image was stored.  Laterality: Right  Vein accessed: External jugular vein  Access technique: Micropuncture set with 21 gauge needle    Catheter placement  The access site was dilated and the catheter was placed into the vein  over a wire  under fluoroscopic guidance.  The catheter tip location  was fluoroscopically verified and a permanent image was stored.. A  sterile dressing was applied.  Catheter placed: Mac  Catheter size (Kittitian): 14  Catheter length (cm): 15  Catheter flush: Heparin (1000 units/mL)  Catheter securement technique: Non-absorbable  suture    Radiation Dose  Fluoroscopy time (minutes): 2.2 min    Reference air kerma (mGy): 14.5   Kerma area product (uGy-m2): 339.6     Additional Details  Additional description of procedure: None  Registry event: V/3/g  Device used: None  Equipment details: None  Unique Device Identifiers: Not available  Specimens removed: None  Estimated blood loss (mL): Less than 10  Standardized report: [email or web address]_v3.1    Attestation  Signer name: GORDON DOMINGUEZ MD  I attest that I was present for the entire procedure. I reviewed the  stored images and agree with the report as written.     XR Chest Port 1 View    Narrative    EXAM: Chest radiograph 6/15/2023 7:51 AM    HISTORY: 67 years Male Ongoing cough with secretions, on CRRT. Kidney  and liver transplant 6/6/2023.    COMPARISON: Chest x-ray 6/10/2023.    TECHNIQUE: Portable AP view of the chest.    FINDINGS:   Right IJ central venous catheter with distal tip overlying the SVC.  Enteric tube crosses the diaphragm and projects out of the field of  view. Embolization material overlying the left upper quadrant.  Surgical clips in the right upper quadrant.    Trachea is midline. Stable enlarged cardiac silhouette size. Distinct  pulmonary vasculature. Continued patchy/hazy perihilar and basilar  interstitial pulmonary opacities, mildly improved compared to prior  chest x-ray on 6/10/2023. No pneumothorax. Trace blunting of the  bilateral costophrenic angles. The visualized upper abdomen is  unremarkable. No suspicious osseous abnormality. The soft tissues  unremarkable.      Impression    IMPRESSION:   1.  Continued, left greater than right, perihilar/basilar pulmonary  opacities, likely represents atelectasis and/or pulmonary edema;  however infection cannot be entirely ruled out.  2.  Stable support devices as described above.  3.  Trace bilateral pleural effusions are grossly unchanged.    I have personally reviewed the examination and initial  interpretation  and I agree with the findings.    GIBRAN FRASER MD         SYSTEM ID:  [id number]   US Lower Extremity Venous Duplex Bilateral    Narrative    EXAMINATION: DOPPLER VENOUS ULTRASOUND OF BILATERAL LOWER EXTREMITIES,  6/15/2023 10:44 AM     COMPARISON: Lower extremity venous ultrasound 1/23/2023    HISTORY: Bilateral lower extremity edema    TECHNIQUE:  Gray-scale evaluation with compression, spectral flow and  color Doppler assessment of the deep venous system of both legs from  groin to knee, and then at the ankles.    FINDINGS:  In both lower extremities, the common femoral, femoral, popliteal and  posterior tibial veins demonstrate normal compressibility and blood  flow. Nonvisualization of the deep veins in both calves due to the  subcutaneous edema.      Impression    IMPRESSION:  1.  No evidence of deep venous thrombosis in either of the visualized  lower extremity veins. Nonvisualization of the deep veins in either  calf.  2.  Subcutaneous edema about both ankles extending to the calves.    I have personally reviewed the examination and initial interpretation  and I agree with the findings.    ADRIANA BARROS MD         SYSTEM ID:  [id number]   US Upper Ext Venous Duplex Limited Bilat    Narrative    EXAMINATION: DOPPLER VENOUS ULTRASOUND OF BILATERAL UPPER EXTREMITIES,  6/15/2023 10:45 AM     COMPARISON: Right upper extremity venous ultrasound 3/3/2023    HISTORY: Status post liver and kidney transplant on CRRT.    TECHNIQUE:  Gray-scale evaluation with compression, spectral flow, and  color Doppler assessment of the deep venous system of both upper  extremities.    FINDINGS:  Unchanged partially occlusive echogenic peripheral thrombus in the  inferior right internal jugular vein similar to the comparison venous  ultrasound. The right innominate, subclavian, and axillary veins and  straight normal blood flow and venous waveform. The previous partially  occlusive thrombus in the right axillary vein  has resolved.    Regarding the left upper extremity, normal blood flow and waveforms  are demonstrated in the internal jugular, innominate, subclavian, and  axillary veins.      Impression    IMPRESSION:  1.  Persistent nonocclusive thrombus in the lower right internal  jugular vein similar to the comparison ultrasound on 3/3/2023.  2.  Resolved right axillary venous thrombus.  3.  No deep venous thrombosis of the central left upper extremity  veins.    I have personally reviewed the examination and initial interpretation  and I agree with the findings.    ADRIANA BARROS MD         SYSTEM ID:  [id number]

## 2023-06-15 NOTE — PROGRESS NOTES
Transplant Surgery  Inpatient Daily Progress Note  06/15/2023    Assessment & Plan:   67 year old male with PMHx of decompensated alcohol + hemochromatosis (homozygous H63D) cirrhosis complicated by variceal bleeding s/p TIPS (10/1/2022) and hepatic encephalopathy + CKD (IgA nephropathy + ANCA vasculitis) s/p simultaneous liver kidney transplant on 2023. RTOR on  with concern for low flow in the renal graft - ex-lap, washout, closure with healthy appearing graft with intact arterial and venous flow.      s/p DBD simultaneous liver kidney transplant on 2023 with complex reconstruction of accessory right hepatic artery. POD #8  * RTOR on  with concern for low flow in the renal graft - ex-lap, washout, closure with healthy appearing graft with intact arterial and venous flow.     Liver studies downtrending/stable.   -Liver US w/doppler 2023 - low waveforms and low resistive indices, but stable velocities and upstrokes.   Stent: No biliary stent in place. Ursodiol 250mg BID.  Drains: 1 MAE drain in place; output in the last 24 hours: 78cc   - ASA 325mg daily      H/o CKD related IgA nephropathy and ANCA vasculitis s/p  donor renal transplant on 2023 with ureteral/J stent placement, delayed graft function  Nephrology consulted. UOP in the last 24 hours: 488 but increasing since midnight.   - Renal US with doppler  with patent vessels  - Renal US with doppler  with faster than expected iliac vein velocity above the anastomosis may represent edema and elevated superior arcuate artery resistive index.  - IR consult for temporary dialysis line; placed on   - CRRT started on  given worsening renal dysfunction, uremia and encephalopathy  Metabolic acidosis: CO2 18 today. Sodium bicarbonate 650mg TID.   Hyperphosphatemia: HD as above    Immunosuppressed status secondary to medications:   - Thymoglobulin: s/p 1mg/kg on , , , . Hold starting 6/10 due to concern for  bacteremia and possible sepsis. No further doses of thymoglobulin.   - Steroid taper per protocol, completed, now resumed PTA prednisone 10 mg for rheumatoid arthritis (see below). Will plan to taper down to 5 mg Prednisone for chronic use.   - Tacrolimus 1.5 mg BID , goal 5-8.  - MMF 750mg BID    Hematology:   Acute on chronic anemia: Hgb stable 8-9; last pRBC transition on 6/12/2023 (1u pRBC).   Thrombocytopenia: Likely related to chronic liver disease, slowly improving, 97 this AM.     Cardiac:   Coronary artery disease: Continue  mg daily. Resume statin at 20 mg daily.      Paroxysmal atrial fibrillation: HR . Cardiology consulted: recommended low dose beta blockade.              * Metoprolol 25 mg q8h + metoprolol 5mg IV PRN for sustained tachycardia > 120               * Plan to start heparin gtt with provider managed Xa goal 0.15-0.2     Hypotension: Baseline hypotension with concern for sepsis with new finding of bacteremia. 6/10 Echo: EF 55-60%. S/p vasopressors (last 6/12/2023)   * Midodrine 5mg TID restarted      * Levo restarted, currently on 0.05     Respiratory:  Acute hypoxic respiratory failure in the setting of post-operative state, RESOLVED: Extubated on 6/7, intermittently requiring supplemental nasal canula. Now on ambient air.               * Continue pulmonary toilet and incentive spirometry    GI/Nutrition:   Hepatitis B s Ag +: Noted to be positive pre-transplant on 6/5/23, but not previously positive (1/26/2023). HBV DNA negative on 6/5/23. No clear at risk behavior. Repeat Hep B s Ag was negative and HBV DNA not detected; suspect 6/5/2023 HBsAg positivity was a false positive.      Mild protein calorie malnutrition: Nasal-enteric feeding tube in place  - NPO and TF at 40cc/hr after line placement. Nutrition consulted    Post-operative ileus, RESOLVED: Distended stomach and bowel loops noted on 6/10 AXR. NG placed and now removed. Now good stool output which suggests against  ileus.      Bowel regimen: Senna.     Endocrine:  Post-operative elevated blood glucoses: HgA1c 6.1% (6/6/2023)              * Sliding scale insulin prn     Rheumatology:  Psoriatic arthritis: Prior to admission was on prednisone 10mg daily, on prednisone taper as above.               *Goal to discharge on prednisone 5mg; may consider cortisol stimulation test given long term steroid use    : Lux in place for strict I&Os    Neurologic  Encephalopathy: Altered mental status was first noted 6/11/2023. Neurology consulted. Head CT without evidence of acute intra-cranial pathology. MRI/MRA - small area of diffusion restriction on DWI without correlation on ADC. EEG consistent with severe encephalopathy, but no seizures. Etiology of encephalopathy likely multifactorial: worsening uremia and recent bacteremia (although clearance of bacteremia has not resulted to improvement in mental status). No sedating medications; no recent opioids  - Plan for HD as above, if there is not improvement in her encephalopathy with uremia improvement, will consider immunosuppression adjustment (tacrolimus -> cyclosporine)  - Delirium precautions    Acute post operative pain:    * Methocarbamol 500mg q4h PRN   * Oxycodone 2.5-5mgm q4h PRN     Deconditioning/Weakness: OT and PT optimization     Alcohol use disorder: Continue sober supports post transplant. Continue complete sobriety.    ID  Enterococcus Faecium bacteremia: 6/9 blood cultures positive for enterococcus faecium. No vegetations noted on TTE. Urine culture 6/10/23 without any growth. ID consulted.   * Daily BCx until clear; blood cultures from 6/10, 6/11, 6/12 and 6/13 with no growth to date  * Antibiotics: Vancomycin 6/12-current; s/p linezolid 6/10-6/12. S/p empiric zosyn 6/10-6/12    Prophylaxis: Mechanical DVT ppx (heparin subcutaneous; holding pending line) , fall, GI (PPI BID), fungal (micafungin x 14 days), CMV (valganciclovir, CMV IgG Ab +), PJP  "(bactrim)     Disposition: Ongoing SICU status given plan for CRRT (vs iHD) given hypotension     GEORGE/Fellow/Resident Provider: CASI MaciasC [id number]     Faculty: Charleen Garcia MD  _________________________________________________________________    Interval History:  Overnight events: No acute events overnight. Afebrile.  - Ongoing encephalopathy   - Stable on room air  - New pressor requirements with CRRT initiation     ROS:   A 10-point review of systems was negative except as noted above.    Meds:    aspirin  325 mg Oral Daily     B and C vitamin Complex with folic acid  5 mL Oral Daily     [Held by provider] calcium acetate (phos binder)  667 mg Oral or Feeding Tube Q6H     heparin  3 mL Intracatheter Q24H     [Held by provider] heparin ANTICOAGULANT  5,000 Units Subcutaneous Q12H     insulin aspart  1-6 Units Subcutaneous Q4H     metoprolol tartrate  25 mg Oral Q8H KARAN     micafungin  100 mg Intravenous Q24H     midodrine  10 mg Oral or Feeding Tube TID w/meals     mycophenolate  750 mg Oral BID IS     pantoprazole  40 mg Per Feeding Tube QAM AC     predniSONE  10 mg Per Feeding Tube Daily     protein modular  1 packet Per Feeding Tube TID     sodium bicarbonate  650 mg Oral or Feeding Tube TID     sodium chloride (PF)  3 mL Intravenous Q8H     sulfamethoxazole-trimethoprim  1 tablet Oral or Feeding Tube Once per day on Mon Wed Fri     tacrolimus  1.5 mg ORAL OR NJ TUBE BID IS     ursodiol  250 mg Oral BID     valGANciclovir  450 mg Oral Daily    Or     valGANciclovir  450 mg Oral or NG Tube Daily     vancomycin  1,500 mg Intravenous Q24H       Physical Exam:     Admit Weight: 102.3 kg (225 lb 8 oz)    Current vitals:   BP 97/75   Pulse 112   Temp 97.7  F (36.5  C) (Axillary)   Resp 22   Ht 1.702 m (5' 7\")   Wt 107.1 kg (236 lb 1.8 oz)   SpO2 97%   BMI 36.98 kg/m      Vital sign ranges:    Temp:  [96.8  F (36  C)-98.2  F (36.8  C)] 97.7  F (36.5  C)  Pulse:  [] 112  Resp:  [11-26] 22  BP: " (97-99)/(75-76) 97/75  MAP:  [53 mmHg-272 mmHg] 81 mmHg  Arterial Line BP: ()/() 107/69  SpO2:  [90 %-99 %] 97 %    General Appearance: NAD  Skin: Warm, perfused  Heart: Irregular rhythm, tachycardic   Lungs: Stable on room air, coughing  Abdomen: Obese. Abdomen soft, appropriately tender. Abdominal incision is clean, dry, and intact. MAE drain in place with serosanguinous drainage  : Lux with dark urine  Extremities: 1+ edema in Mima bilaterally.   Neurologic: Confused, answers questions. Intermittently follows simple commands.     Data:   CMP  Recent Labs   Lab 06/15/23  0830 06/15/23  0418 06/15/23  0417 06/15/23  0056 06/14/23  2118 06/14/23  0404 06/14/23  0357   NA  --   --  137  --  137  --  135*   POTASSIUM  --   --  3.7  --  4.2  --  4.6   CHLORIDE  --   --  103  --  100  --  98   CO2  --   --  18*  --  16*  --  16*   * 138* 151*   < > 121*  132*   < > 130*   BUN  --   --  73.3*  --  96.9*  --  111.0*   CR  --   --  3.14*  --  4.51*  --  5.53*   GFRESTIMATED  --   --  21*  --  14*  --  11*   NAZARIO  --   --  8.3*  --  8.0*  --  8.4*   ICAW  --   --  4.6  --  4.2*  --  4.3*   MAG  --   --  2.2  --  2.6*  --  2.9*   PHOS  --   --  4.1  --  5.5*  --  8.2*   ALBUMIN  --   --  2.2*  --  2.2*  --  2.3*   BILITOTAL  --   --  2.2*  --   --   --  2.1*   ALKPHOS  --   --  235*  --   --   --  206*   AST  --   --  72*  --   --   --  66*   ALT  --   --  130*  --   --   --  148*    < > = values in this interval not displayed.     CBC  Recent Labs   Lab 06/15/23  0417 06/14/23  0357   HGB 9.3* 8.2*   WBC 9.4 7.6   PLT 97* 89*     Renal US w/doppler 6/14/2023  1. Three renal arteries by surgical report. Two were joined to a common patch and there were 2 renal artery anastomoses. Only 1 of the arterial anastomoses was visualized today. No renal transplant arterial or venous stenosis demonstrated.  2. Faster than expected iliac vein velocity above the anastomosis may represent edema  3. Elevated superior  arcuate artery resistive index.  4. Negative grayscale appearance of the renal transplant.

## 2023-06-15 NOTE — PROGRESS NOTES
[facility]   Transplant Nephrology Progress Note  Date of Admission:  6/5/2023  Today's Date: 06/15/2023    Recommendations:  - continue CRRT, switch to 4 K for all bags, I=0  - continue prednisone 10 mg, FK goal close to 8  - MMF increased to 750 mg po bid 6/14, BCx remain neg as of 6/11 on reduced dose since 6/10 but back on pressors, will re-evaluate dose adjustment tomorrow  - will consider tacrolimus to CSA switch tomorrow if no improvement in neurologic status with resolution of bacteremia and CRRT ~48-72h  - further infectious w/up per primary team with leukocytosis, persistent altered mental status, and hemodynamic instability    Assessment & Plan   # DDKT: DGF resume CRRT 6/14 for clearance   - Baseline Creatinine: ~ TBD   - Proteinuria: Not checked post transplant   - Date DSA Last Checked: Jun/2023      Latest DSA: Low level DSA (<1000 mfi) to CW9,    - BK Viremia: Not checked post transplant   - Kidney Tx Biopsy: No    -Pt was receiving CRRT from 6/6-6/10, stopped and LIJ tunneled line removed due to  bacteremia. Resume 6/14   -No acute indication for RRT today.off pressors 6/12.   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   - LFTs downtrending   - management per tx surgery    # Immunosuppression: Tacrolimus immediate release (goal 5-8), Mycophenolate mofetil (dose 500 mg every 12 hours) and Prednisone (dose taper) to 10 mg daily    - Induction with Recent Transplant:  rATG total 4mg/kg, stopped due to sepsis   - Changes: increase MMF to 750/750 6/14     # Infection Prophylaxis:   - PJP: Sulfa/TMP (Bactrim)  - CMV: Valganciclovir (Valcyte),CMV IgG Ab positive  - Thrush: Micafungin (Mycamine)  - Fungal: Micafungin (Mycamine)    # History of C.diff:   -Consider PO vanc with abx, last cdiff screen neg    # E.Faecium bacteremia:   -Diagnosed on 6/9 BlCx. Pending sensitivities. Obtain cultures  daily until clearance   -Broad spectrum abx (linezolid, vanc, zosyn x48h) , transplant ID consulted, appreciate recommendations   -Removed tunneled lines and all other lines on 6/10   -BCx NGTD 6/11TTE w/ poor windows was negative on 6/10    # ANCA vasculitis:   -S/p Rituximab x2   -Obtain intermittent U/A     # Paroxysmal A-Fib: On coumadin and metoprolol ER 25 mg daily PTA. Consider switching to eliquis when kidney and liver are functioning.    -Agree with IV metoprolol 5mg q4h     # Hypotension: Hypotensive, off pressors 6/12;  Goal BP: MAP > 65   - Volume status: hypervolemic EDW ~ 96.4 kg (on HD)   - Changes: Not at this time.    # Encephalopathy   - aphasia+confusions: code stroke 6/11, CTH unremarkable, MRI brain acute to subacute infarct in the right centrum semiovale but not thought to be related to presentation per neurology..EEG shows slowing consistent  with moderate to severe diffuse encephalopathy.  bacteremia cleared and restarted on CRRT 6/14    # Elevated Blood Glucose: Glucose generally running ~ 160s-200   - Management as per primary team.  On insulin gtt.    # Anemia in Chronic Renal Disease and acute blood loss: Hgb: Trend down      FEDERICO: No   - Iron studies: Unknown at this time, but checked with dialysis    -Transfuse for Hb<7    # Mineral Bone Disorder:   - Secondary renal hyperparathyroidism; PTH level: Minimally elevated ( pg/ml)        On treatment: None  - Vitamin D; level: Not checked recently, but was normal last check        On supplement: No  - Calcium; level: Normal when corrected for low alb       On supplement: No  - Phosphorus; level: High     On binder: No    # Ileus: resolved   -NG removed    # Electrolytes:   - Potassium; level: normal         On supplement: No  - Magnesium; level: High          On supplement: No  - Bicarbonate; level: lwo normal         On supplement: No  - Sodium; level: Normal     # Gout:    - On prednisone 10 mg PO daily PTA. Currently on prednisone 10  with plan to eventually wean to prednisone 5 once hemoydynamically stable, other immunosuppression dose/trough at goal    # Transplant History:  Etiology of Kidney Failure: IgA nephropathy and ANCA vasculitis  Tx: Liver Tx ([facility])  Transplant: 6/6/2023 (Liver), 6/6/2023 (Kidney)  Significant changes in immunosuppression: None  Significant transplant-related complications: None    Recommendations were communicated to the primary team verbally       Darrel Patino MD   Pager: [phone]    Interval History   On pressors today levophed and remains lethargic and minimally interactive on evaluation  Started CRRT, I=o, UOP up-463 ml so far today    Review of Systems   4 point ROS was obtained and negative except as noted in the Interval History.    MEDICATIONS:    aspirin  325 mg Oral Daily     B and C vitamin Complex with folic acid  5 mL Oral Daily     calcium acetate (phos binder)  667 mg Oral or Feeding Tube Q6H     heparin  3 mL Intracatheter Q24H     [Held by provider] heparin ANTICOAGULANT  5,000 Units Subcutaneous Q12H     insulin aspart  1-6 Units Subcutaneous Q4H     metoprolol tartrate  25 mg Oral Q8H KARAN     micafungin  100 mg Intravenous Q24H     midodrine  10 mg Oral or Feeding Tube TID w/meals     mycophenolate  750 mg Oral BID IS     pantoprazole  40 mg Per Feeding Tube QAM AC     predniSONE  10 mg Per Feeding Tube Daily     protein modular  1 packet Per Feeding Tube TID     sodium bicarbonate  650 mg Oral or Feeding Tube TID     sodium chloride (PF)  3 mL Intravenous Q8H     sulfamethoxazole-trimethoprim  1 tablet Oral or Feeding Tube Once per day on Mon Wed Fri     tacrolimus  1.5 mg ORAL OR NJ TUBE BID IS     ursodiol  250 mg Oral BID     valGANciclovir  450 mg Oral Daily    Or     valGANciclovir  450 mg Oral or NG Tube Daily     vancomycin  1,500 mg Intravenous Q24H       dextrose       CRRT replacement solution 12.5 mL/kg/hr (06/15/23 0748)     heparin 600 Units/hr (06/15/23 0700)     - MEDICATION  "INSTRUCTIONS -       norepinephrine 0.03 mcg/kg/min (06/15/23 0700)     CRRT replacement solution 200 mL/hr at 23 1625     CRRT replacement solution 12.5 mL/kg/hr (06/15/23 0748)       Physical Exam   Temp  Av.9  F (36.6  C)  Min: 97  F (36.1  C)  Max: 99.2  F (37.3  C)  Arterial Line BP  Min: 60/45  Max: 131/80  Arterial Line MAP (mmHg)  Av mmHg  Min: 52 mmHg  Max: 101 mmHg      Pulse  Av.5  Min: 68  Max: 159 Resp  Av.8  Min: 14  Max: 23  FiO2 (%)  Av.3 %  Min: 40 %  Max: 50 %  SpO2  Av.1 %  Min: 92 %  Max: 100 %    CVP (mmHg): 6 mmHgBP 97/75   Pulse (!) 124   Temp 98.2  F (36.8  C) (Axillary)   Resp 12   Ht 1.702 m (5' 7\")   Wt 107.1 kg (236 lb 1.8 oz)   SpO2 97%   BMI 36.98 kg/m      Admit Weight: 102.3 kg (225 lb 8 oz)     GENERAL APPEARANCE: lethargic  RESP: lungs clear to auscultation - no rales, rhonchi or wheezes  CV: regular rhythm, normal rate, no rub, no murmur  EDEMA: ++ LE edema bilaterally  ABDOMEN: soft, nondistended, nontender, bowel sounds normal  MS: extremities normal - no gross deformities noted, no evidence of inflammation in joints, no muscle tenderness  SKIN: no rash  Neuro: lethargic difficult to arouse   DIALYSIS ACCESS:  None    Data   All labs reviewed by me.  CMP  Recent Labs   Lab 06/15/23  0418 06/15/23  0417 06/15/23  0056 23  2118 23  0404 23  0357 23  1550 23  1248 23  0737 23  0351 23  0758 23  0354   NA  --  137  --  137  --  135*  --  135*  --  137   < > 136   POTASSIUM  --  3.7  --  4.2  --  4.6  --  5.1  --  5.1   < > 5.1   CHLORIDE  --  103  --  100  --  98  --  100  --  100   < > 100   CO2  --  18*  --  16*  --  16*  --  16*  --  17*   < > 19*   ANIONGAP  --  16*  --  21*  --  21*  --  19*  --  20*   < > 17*   * 151* 101* 121*  132*   < > 130*   < > 131*  137*   < > 105*  114*   < > 108*  113*   BUN  --  73.3*  --  96.9*  --  111.0*  --  93.6*  --  84.7*   < > 66.0* "   CR  --  3.14*  --  4.51*  --  5.53*  --  4.95*  --  4.77*   < > 3.83*   GFRESTIMATED  --  21*  --  14*  --  11*  --  12*  --  13*   < > 17*   NAZARIO  --  8.3*  --  8.0*  --  8.4*  --  8.1*  --  8.0*   < > 7.7*   MAG  --  2.2  --  2.6*  --  2.9*  --  2.8*  --  2.9*  --  2.7*   PHOS  --  4.1  --  5.5*  --  8.2*  --  8.5*  --  8.7*  --  7.7*   PROTTOTAL  --  4.7*  --   --   --  5.0*  --   --   --  4.9*  --  4.5*   ALBUMIN  --  2.2*  --  2.2*  --  2.3*  --   --   --  2.4*  --  2.3*   BILITOTAL  --  2.2*  --   --   --  2.1*  --   --   --  2.3*  --  3.0*   ALKPHOS  --  235*  --   --   --  206*  --   --   --  138*  --  119   AST  --  72*  --   --   --  66*  --   --   --  61*  --  75*   ALT  --  130*  --   --   --  148*  --   --   --  158*  --  186*    < > = values in this interval not displayed.     CBC  Recent Labs   Lab 06/15/23  0417 06/14/23  0357 06/13/23  0351 06/12/23  1145   HGB 9.3* 8.2* 8.0* 8.1*   WBC 9.4 7.6 8.6 8.1   RBC 3.07* 2.74* 2.61* 2.68*   HCT 28.5* 25.5* 24.3* 25.1*   MCV 93 93 93 94   MCH 30.3 29.9 30.7 30.2   MCHC 32.6 32.2 32.9 32.3   RDW 17.6* 17.5* 17.3* 17.2*   PLT 97* 89* 55* 50*     INR  Recent Labs   Lab 06/15/23  0417 06/14/23  2118 06/14/23  0357 06/13/23  0351 06/12/23  0354   INR 1.03  --  1.06 1.23* 1.27*   PTT 30 30 26 27 30     ABG  Recent Labs   Lab 06/12/23  0535 06/11/23  1833 06/11/23  1244 06/10/23  1807   PH 7.34* 7.34* 7.34* 7.37   PCO2 38 39 39 42   PO2 176* 128* 124* 107*   HCO3 20* 21 21 24   O2PER 28 2 5 4      Urine Studies  Recent Labs   Lab Test 06/10/23  1613 06/05/23  1620 04/07/23  1246 02/13/23  0743   COLOR Orange* Yellow Yellow Yellow   APPEARANCE Cloudy* Clear Clear Clear   URINEGLC 30* Negative Negative Negative   URINEBILI Negative Negative Negative Negative   URINEKETONE Negative Negative Negative Negative   SG 1.021 1.012 1.011 1.014   UBLD Large* Moderate* Moderate* Negative   URINEPH 5.5 5.5 5.5 5.0   PROTEIN 100* 10* 10* 10*   NITRITE Negative Negative  Negative Negative   LEUKEST Large* Negative Negative Negative   RBCU >182* 2 5* 1   WBCU 128* 7* 2 2     No lab results found.  PTH  Recent Labs   Lab Test 05/19/23  0956   PTHI 67*     Iron Studies  Recent Labs   Lab Test 11/22/22  0848   IRON 68   *   IRONSAT 31   HOLA 429*       IMAGING:  All imaging studies reviewed by me.

## 2023-06-15 NOTE — PROCEDURES
[facility]    Central line    Date/Time: 6/15/2023 4:06 PM    Performed by: Lindsay Peña MD  Authorized by: Truong Nowak MD  Indications: vascular access      UNIVERSAL PROTOCOL   Site Marked: Yes  Prior Images Obtained and Reviewed:  Yes  Required items: Required blood products, implants, devices and special equipment available    Patient identity confirmed:  Verbally with patient, arm band and hospital-assigned identification number  NA - No sedation, light sedation, or local anesthesia  Confirmation Checklist:  Patient's identity using two indicators  Time out: Immediately prior to the procedure a time out was called    Universal Protocol: the Joint Commission Universal Protocol was followed    Preparation: Patient was prepped and draped in usual sterile fashion    ESBL (mL):  2     ANESTHESIA    Anesthesia: Local infiltration  Local Anesthetic:  Lidocaine 1% with epinephrine  Anesthetic Total (mL):  3      SEDATION    Patient Sedated: No      Preparation: skin prepped with 2% chlorhexidine  Sterile barriers: all five maximum sterile barriers used - cap, mask, sterile gown, sterile gloves, and large sterile sheet  Hand hygiene: hand hygiene performed prior to central venous catheter insertion  Patient position: Trendelenburg  Catheter type: triple lumen  Catheter size: 7 Fr  Pre-procedure: landmarks identified  Ultrasound guidance: yes  Sterile ultrasound techniques: sterile gel and sterile probe covers were used  Number of attempts: 2  Successful placement: yes  Post-procedure: line sutured and dressing applied  Assessment: blood return through all ports      PROCEDURE  Describe Procedure: Ports unable to draw back or flush with line hubbed, pulled back to 15cm and able to flush and draw back on all ports.   Patient Tolerance:  Patient tolerated the procedure well with no immediate complications  Length of time physician/provider present for 1:1  monitoring during sedation: 0      Pre-procedure diagnosis: end stage renal disease  Post- procedure diagnosis: same  Indication for procedure: need for central access    Dr. Nowak was immediately available throughout the procedure      Lindsay Peña MD  Surgery PGY2

## 2023-06-15 NOTE — PLAN OF CARE
Goal Outcome Evaluation:    D/I: According to pt's wife, pt is more alert today and following simple commands.... at time. Pt alert/confused/illogical words, but able to say few words at a time. Oriented 1-2.  No c/o pain.  A: Maintaining MAP >60 on Norepinephrine gtt. Heparin increased to 900units. Not on protocol but plan to check Heparin 10a level 6hrs after. U/S completed and left internal jugular TL central line placed.. xray completed.   P: Hgb 7 (1unit  PRBC ordered and arrived) Report given to the night RN and will resume with orders.       Plan of Care Reviewed With: patient, spouse, child    Overall Patient Progress: no changeOverall Patient Progress: no change

## 2023-06-15 NOTE — PROGRESS NOTES
CRRT STATUS NOTE    DATA:  Time:  5:58 PM  Pressures WNL:  YES  Filter Status:  WDL    Problems Reported/Alarms Noted:  None     Supplies Present:  YES    ASSESSMENT:  Patient Net Fluid Balance:  Net +41.8 ml since MN, +11.2L since admission.  Vital Signs:  Temp: 98.2  F (36.8  C) Temp src: Axillary BP: (!) 83/69 Pulse: 97   Resp: 24 SpO2: 100 % O2 Device: Nasal cannula Oxygen Delivery: 2 LPM    Labs:  Last Comprehensive Metabolic Panel:  Sodium   Date Value Ref Range Status   06/15/2023 137 136 - 145 mmol/L Final     Potassium   Date Value Ref Range Status   06/15/2023 3.8 3.4 - 5.3 mmol/L Final   08/27/2019 4.6 3.5 - 5.1 mmol/L Final     Potassium POCT   Date Value Ref Range Status   06/07/2023 3.7 3.5 - 5.0 mmol/L Final     Chloride   Date Value Ref Range Status   06/15/2023 103 98 - 107 mmol/L Final     Carbon Dioxide (CO2)   Date Value Ref Range Status   06/15/2023 21 (L) 22 - 29 mmol/L Final     Anion Gap   Date Value Ref Range Status   06/15/2023 13 7 - 15 mmol/L Final     Glucose   Date Value Ref Range Status   08/27/2019 102 (A) 70 - 100 mg/dL Final     GLUCOSE BY METER POCT   Date Value Ref Range Status   06/15/2023 161 (H) 70 - 99 mg/dL Final     Urea Nitrogen   Date Value Ref Range Status   06/15/2023 57.1 (H) 8.0 - 23.0 mg/dL Final     Creatinine   Date Value Ref Range Status   06/15/2023 2.33 (H) 0.67 - 1.17 mg/dL Final   08/27/2019 1.11 0.73 - 1.18 mg/dL Final     GFR Estimate   Date Value Ref Range Status   06/15/2023 30 (L) >60 mL/min/1.73m2 Final     Comment:     eGFR calculated using 2021 CKD-EPI equation.   08/27/2019 >60 >60 mL/min/1.73m2 Final     Calcium   Date Value Ref Range Status   06/15/2023 8.3 (L) 8.8 - 10.2 mg/dL Final      Lab Results   Component Value Date    WBC 11.9 06/15/2023     Lab Results   Component Value Date    RBC 2.35 06/15/2023     Lab Results   Component Value Date    HGB 7.0 06/15/2023     Lab Results   Component Value Date    HCT 21.8 06/15/2023     No components found  for: MCT  Lab Results   Component Value Date    MCV 93 06/15/2023     Lab Results   Component Value Date    MCH 31.5 06/15/2023     Lab Results   Component Value Date    MCHC 33.9 06/15/2023     Lab Results   Component Value Date    RDW 17.7 06/15/2023     Lab Results   Component Value Date     06/15/2023        Goals of Therapy:  I=0    INTERVENTIONS:   Solutions all changed to 4K, PBP & Dialysate rate decreased to 1300, weight and hematocrit updated. One unit of Prbc's given for hemoglobin of 7.0g/dl.     PLAN:  Continue fluid removal per goals of care as patient tolerates. Check filter daily. Change filter q72 hours and PRN. Please call CRRT resource RN  with any questions or concerns.

## 2023-06-15 NOTE — PROGRESS NOTES
CRRT STATUS NOTE    DATA:  Time:  0500  Pressures WNL:  YES  Filter Status:  WDL    Problems Reported/Alarms Noted:  Access issues with cough and position on neck, lines remain reversed.     Supplies Present:  YES    ASSESSMENT:  Patient Net Fluid Balance:  Net positive +1.7L at midnight, net neg-237ml so far toady.           Vital Signs:    Temp:  [96.8  F (36  C)-98.2  F (36.8  C)] 98.2  F (36.8  C)  Pulse:  [] 120  Resp:  [9-26] 20  BP: (99)/(76) 99/76  MAP:  [53 mmHg-109 mmHg] 54 mmHg  Arterial Line BP: ()/(14-88) 79/44  SpO2:  [90 %-99 %] 91 %      Labs:  Down trending potassium, result wnl, but possible solution adjustment to be determined by team this morning.     Last Renal Panel:  Sodium   Date Value Ref Range Status   06/15/2023 137 136 - 145 mmol/L Final     Potassium   Date Value Ref Range Status   06/15/2023 3.7 3.4 - 5.3 mmol/L Final   08/27/2019 4.6 3.5 - 5.1 mmol/L Final     Potassium POCT   Date Value Ref Range Status   06/07/2023 3.7 3.5 - 5.0 mmol/L Final     Chloride   Date Value Ref Range Status   06/15/2023 103 98 - 107 mmol/L Final     Carbon Dioxide (CO2)   Date Value Ref Range Status   06/15/2023 18 (L) 22 - 29 mmol/L Final     Anion Gap   Date Value Ref Range Status   06/15/2023 16 (H) 7 - 15 mmol/L Final     Glucose   Date Value Ref Range Status   06/15/2023 151 (H) 70 - 99 mg/dL Final   08/27/2019 102 (A) 70 - 100 mg/dL Final     GLUCOSE BY METER POCT   Date Value Ref Range Status   06/15/2023 138 (H) 70 - 99 mg/dL Final     Urea Nitrogen   Date Value Ref Range Status   06/15/2023 73.3 (H) 8.0 - 23.0 mg/dL Final     Creatinine   Date Value Ref Range Status   06/15/2023 3.14 (H) 0.67 - 1.17 mg/dL Final   08/27/2019 1.11 0.73 - 1.18 mg/dL Final     GFR Estimate   Date Value Ref Range Status   06/15/2023 21 (L) >60 mL/min/1.73m2 Final     Comment:     eGFR calculated using 2021 CKD-EPI equation.   08/27/2019 >60 >60 mL/min/1.73m2 Final     Calcium   Date Value Ref Range Status    06/15/2023 8.3 (L) 8.8 - 10.2 mg/dL Final     Phosphorus   Date Value Ref Range Status   06/15/2023 4.1 2.5 - 4.5 mg/dL Final     Albumin   Date Value Ref Range Status   06/15/2023 2.2 (L) 3.5 - 5.2 g/dL Final         Goals of Therapy:  Attempting to Achieve.     INTERVENTIONS/PLAN:  Continue CRRT. On low dose peripheral levophed. Potassium down trending.  Bedside RN flushed and replaced stauffer catheter r/t clots and patency. Patient will continue to be monitored and assessed. Please see MAR, flow sheets, and remainder of chart for further details. .

## 2023-06-16 ENCOUNTER — APPOINTMENT (OUTPATIENT)
Dept: GENERAL RADIOLOGY | Facility: CLINIC | Age: 68
End: 2023-06-16
Attending: INTERNAL MEDICINE
Payer: COMMERCIAL

## 2023-06-16 ENCOUNTER — APPOINTMENT (OUTPATIENT)
Dept: CT IMAGING | Facility: CLINIC | Age: 68
End: 2023-06-16
Attending: STUDENT IN AN ORGANIZED HEALTH CARE EDUCATION/TRAINING PROGRAM
Payer: COMMERCIAL

## 2023-06-16 ENCOUNTER — APPOINTMENT (OUTPATIENT)
Dept: INTERVENTIONAL RADIOLOGY/VASCULAR | Facility: CLINIC | Age: 68
End: 2023-06-16
Payer: COMMERCIAL

## 2023-06-16 ENCOUNTER — APPOINTMENT (OUTPATIENT)
Dept: OCCUPATIONAL THERAPY | Facility: CLINIC | Age: 68
End: 2023-06-16
Attending: INTERNAL MEDICINE
Payer: COMMERCIAL

## 2023-06-16 ENCOUNTER — APPOINTMENT (OUTPATIENT)
Dept: GENERAL RADIOLOGY | Facility: CLINIC | Age: 68
End: 2023-06-16
Attending: STUDENT IN AN ORGANIZED HEALTH CARE EDUCATION/TRAINING PROGRAM
Payer: COMMERCIAL

## 2023-06-16 ENCOUNTER — APPOINTMENT (OUTPATIENT)
Dept: SPEECH THERAPY | Facility: CLINIC | Age: 68
End: 2023-06-16
Attending: INTERNAL MEDICINE
Payer: COMMERCIAL

## 2023-06-16 ENCOUNTER — APPOINTMENT (OUTPATIENT)
Dept: PHYSICAL THERAPY | Facility: CLINIC | Age: 68
End: 2023-06-16
Attending: INTERNAL MEDICINE
Payer: COMMERCIAL

## 2023-06-16 LAB
ALBUMIN SERPL BCG-MCNC: 2.1 G/DL (ref 3.5–5.2)
ALBUMIN UR-MCNC: 70 MG/DL
ALP SERPL-CCNC: 261 U/L (ref 40–129)
ALT SERPL W P-5'-P-CCNC: 109 U/L (ref 0–70)
ANION GAP SERPL CALCULATED.3IONS-SCNC: 12 MMOL/L (ref 7–15)
ANION GAP SERPL CALCULATED.3IONS-SCNC: 13 MMOL/L (ref 7–15)
ANION GAP SERPL CALCULATED.3IONS-SCNC: 13 MMOL/L (ref 7–15)
APPEARANCE UR: ABNORMAL
AST SERPL W P-5'-P-CCNC: 62 U/L (ref 0–45)
BACTERIA BLD CULT: NO GROWTH
BACTERIA BLD CULT: NO GROWTH
BASOPHILS # BLD MANUAL: 0 10E3/UL (ref 0–0.2)
BASOPHILS NFR BLD MANUAL: 0 %
BILIRUB DIRECT SERPL-MCNC: 1.29 MG/DL (ref 0–0.3)
BILIRUB SERPL-MCNC: 1.6 MG/DL
BILIRUB UR QL STRIP: NEGATIVE
BUN SERPL-MCNC: 37.4 MG/DL (ref 8–23)
BUN SERPL-MCNC: 38 MG/DL (ref 8–23)
BUN SERPL-MCNC: 41 MG/DL (ref 8–23)
CA-I BLD-MCNC: 4.2 MG/DL (ref 4.4–5.2)
CA-I BLD-MCNC: 4.3 MG/DL (ref 4.4–5.2)
CA-I BLD-MCNC: 4.5 MG/DL (ref 4.4–5.2)
CALCIUM SERPL-MCNC: 7.7 MG/DL (ref 8.8–10.2)
CALCIUM SERPL-MCNC: 7.8 MG/DL (ref 8.8–10.2)
CALCIUM SERPL-MCNC: 7.8 MG/DL (ref 8.8–10.2)
CHLORIDE SERPL-SCNC: 104 MMOL/L (ref 98–107)
CHLORIDE SERPL-SCNC: 106 MMOL/L (ref 98–107)
CHLORIDE SERPL-SCNC: 107 MMOL/L (ref 98–107)
COLOR UR AUTO: YELLOW
CREAT SERPL-MCNC: 1.47 MG/DL (ref 0.67–1.17)
CREAT SERPL-MCNC: 1.59 MG/DL (ref 0.67–1.17)
CREAT SERPL-MCNC: 1.61 MG/DL (ref 0.67–1.17)
DEPRECATED HCO3 PLAS-SCNC: 19 MMOL/L (ref 22–29)
DEPRECATED HCO3 PLAS-SCNC: 20 MMOL/L (ref 22–29)
DEPRECATED HCO3 PLAS-SCNC: 20 MMOL/L (ref 22–29)
EOSINOPHIL # BLD MANUAL: 0 10E3/UL (ref 0–0.7)
EOSINOPHIL NFR BLD MANUAL: 0 %
ERYTHROCYTE [DISTWIDTH] IN BLOOD BY AUTOMATED COUNT: 17.1 % (ref 10–15)
ERYTHROCYTE [DISTWIDTH] IN BLOOD BY AUTOMATED COUNT: 17.1 % (ref 10–15)
ERYTHROCYTE [DISTWIDTH] IN BLOOD BY AUTOMATED COUNT: 17.4 % (ref 10–15)
ERYTHROCYTE [DISTWIDTH] IN BLOOD BY AUTOMATED COUNT: 17.7 % (ref 10–15)
FIBRINOGEN PPP-MCNC: 863 MG/DL (ref 170–490)
GFR SERPL CREATININE-BSD FRML MDRD: 47 ML/MIN/1.73M2
GFR SERPL CREATININE-BSD FRML MDRD: 48 ML/MIN/1.73M2
GFR SERPL CREATININE-BSD FRML MDRD: 53 ML/MIN/1.73M2
GLUCOSE BLDC GLUCOMTR-MCNC: 153 MG/DL (ref 70–99)
GLUCOSE BLDC GLUCOMTR-MCNC: 157 MG/DL (ref 70–99)
GLUCOSE BLDC GLUCOMTR-MCNC: 166 MG/DL (ref 70–99)
GLUCOSE BLDC GLUCOMTR-MCNC: 180 MG/DL (ref 70–99)
GLUCOSE BLDC GLUCOMTR-MCNC: 192 MG/DL (ref 70–99)
GLUCOSE BLDC GLUCOMTR-MCNC: 212 MG/DL (ref 70–99)
GLUCOSE SERPL-MCNC: 173 MG/DL (ref 70–99)
GLUCOSE SERPL-MCNC: 188 MG/DL (ref 70–99)
GLUCOSE SERPL-MCNC: 222 MG/DL (ref 70–99)
GLUCOSE UR STRIP-MCNC: 150 MG/DL
HCT VFR BLD AUTO: 24 % (ref 40–53)
HCT VFR BLD AUTO: 24.6 % (ref 40–53)
HCT VFR BLD AUTO: 25.2 % (ref 40–53)
HCT VFR BLD AUTO: 25.2 % (ref 40–53)
HGB BLD-MCNC: 7.6 G/DL (ref 13.3–17.7)
HGB BLD-MCNC: 7.7 G/DL (ref 13.3–17.7)
HGB BLD-MCNC: 8.1 G/DL (ref 13.3–17.7)
HGB BLD-MCNC: 8.1 G/DL (ref 13.3–17.7)
HGB UR QL STRIP: ABNORMAL
INR PPP: 1.1 (ref 0.85–1.15)
KETONES UR STRIP-MCNC: NEGATIVE MG/DL
LEUKOCYTE ESTERASE UR QL STRIP: ABNORMAL
LYMPHOCYTES # BLD MANUAL: 0 10E3/UL (ref 0.8–5.3)
LYMPHOCYTES NFR BLD MANUAL: 0 %
MAGNESIUM SERPL-MCNC: 2.1 MG/DL (ref 1.7–2.3)
MAGNESIUM SERPL-MCNC: 2.1 MG/DL (ref 1.7–2.3)
MAGNESIUM SERPL-MCNC: 2.2 MG/DL (ref 1.7–2.3)
MCH RBC QN AUTO: 29.8 PG (ref 26.5–33)
MCH RBC QN AUTO: 30.1 PG (ref 26.5–33)
MCH RBC QN AUTO: 30.1 PG (ref 26.5–33)
MCH RBC QN AUTO: 30.3 PG (ref 26.5–33)
MCHC RBC AUTO-ENTMCNC: 31.3 G/DL (ref 31.5–36.5)
MCHC RBC AUTO-ENTMCNC: 31.7 G/DL (ref 31.5–36.5)
MCHC RBC AUTO-ENTMCNC: 32.1 G/DL (ref 31.5–36.5)
MCHC RBC AUTO-ENTMCNC: 32.1 G/DL (ref 31.5–36.5)
MCV RBC AUTO: 94 FL (ref 78–100)
MCV RBC AUTO: 94 FL (ref 78–100)
MCV RBC AUTO: 95 FL (ref 78–100)
MCV RBC AUTO: 96 FL (ref 78–100)
METAMYELOCYTES # BLD MANUAL: 0.3 10E3/UL
METAMYELOCYTES NFR BLD MANUAL: 2 %
MONOCYTES # BLD MANUAL: 0.1 10E3/UL (ref 0–1.3)
MONOCYTES NFR BLD MANUAL: 1 %
MYELOCYTES # BLD MANUAL: 0.3 10E3/UL
MYELOCYTES NFR BLD MANUAL: 2 %
NEUTROPHILS # BLD MANUAL: 12.5 10E3/UL (ref 1.6–8.3)
NEUTROPHILS NFR BLD MANUAL: 95 %
NITRATE UR QL: NEGATIVE
NRBC # BLD AUTO: 0.3 10E3/UL
NRBC BLD MANUAL-RTO: 2 %
PH UR STRIP: 5.5 [PH] (ref 5–7)
PHOSPHATE SERPL-MCNC: 3.2 MG/DL (ref 2.5–4.5)
PHOSPHATE SERPL-MCNC: 3.3 MG/DL (ref 2.5–4.5)
PHOSPHATE SERPL-MCNC: 3.6 MG/DL (ref 2.5–4.5)
PLAT MORPH BLD: ABNORMAL
PLATELET # BLD AUTO: 130 10E3/UL (ref 150–450)
PLATELET # BLD AUTO: 133 10E3/UL (ref 150–450)
PLATELET # BLD AUTO: 133 10E3/UL (ref 150–450)
PLATELET # BLD AUTO: 144 10E3/UL (ref 150–450)
POLYCHROMASIA BLD QL SMEAR: SLIGHT
POTASSIUM SERPL-SCNC: 3.7 MMOL/L (ref 3.4–5.3)
PROT SERPL-MCNC: 4.7 G/DL (ref 6.4–8.3)
RBC # BLD AUTO: 2.51 10E6/UL (ref 4.4–5.9)
RBC # BLD AUTO: 2.58 10E6/UL (ref 4.4–5.9)
RBC # BLD AUTO: 2.69 10E6/UL (ref 4.4–5.9)
RBC # BLD AUTO: 2.69 10E6/UL (ref 4.4–5.9)
RBC MORPH BLD: ABNORMAL
RBC URINE: >182 /HPF
SODIUM SERPL-SCNC: 136 MMOL/L (ref 136–145)
SODIUM SERPL-SCNC: 138 MMOL/L (ref 136–145)
SODIUM SERPL-SCNC: 140 MMOL/L (ref 136–145)
SP GR UR STRIP: 1.01 (ref 1–1.03)
TACROLIMUS BLD-MCNC: 8.1 UG/L (ref 5–15)
TME LAST DOSE: NORMAL H
TME LAST DOSE: NORMAL H
TRANSITIONAL EPI: <1 /HPF
UFH PPP CHRO-ACNC: <0.1 IU/ML
UROBILINOGEN UR STRIP-MCNC: NORMAL MG/DL
WBC # BLD AUTO: 13.2 10E3/UL (ref 4–11)
WBC # BLD AUTO: 13.2 10E3/UL (ref 4–11)
WBC # BLD AUTO: 14.7 10E3/UL (ref 4–11)
WBC # BLD AUTO: 14.8 10E3/UL (ref 4–11)
WBC URINE: 56 /HPF

## 2023-06-16 PROCEDURE — 250N000012 HC RX MED GY IP 250 OP 636 PS 637: Performed by: PHYSICIAN ASSISTANT

## 2023-06-16 PROCEDURE — 258N000003 HC RX IP 258 OP 636: Performed by: SURGERY

## 2023-06-16 PROCEDURE — 85007 BL SMEAR W/DIFF WBC COUNT: CPT | Performed by: SURGERY

## 2023-06-16 PROCEDURE — 82330 ASSAY OF CALCIUM: CPT | Performed by: SURGERY

## 2023-06-16 PROCEDURE — 250N000013 HC RX MED GY IP 250 OP 250 PS 637: Performed by: TRANSPLANT SURGERY

## 2023-06-16 PROCEDURE — 97530 THERAPEUTIC ACTIVITIES: CPT | Mod: GP | Performed by: PHYSICAL THERAPIST

## 2023-06-16 PROCEDURE — 84100 ASSAY OF PHOSPHORUS: CPT | Performed by: INTERNAL MEDICINE

## 2023-06-16 PROCEDURE — 36573 INSJ PICC RS&I 5 YR+: CPT

## 2023-06-16 PROCEDURE — 85520 HEPARIN ASSAY: CPT | Performed by: TRANSPLANT SURGERY

## 2023-06-16 PROCEDURE — 250N000011 HC RX IP 250 OP 636

## 2023-06-16 PROCEDURE — 71045 X-RAY EXAM CHEST 1 VIEW: CPT

## 2023-06-16 PROCEDURE — 83735 ASSAY OF MAGNESIUM: CPT | Performed by: INTERNAL MEDICINE

## 2023-06-16 PROCEDURE — 250N000013 HC RX MED GY IP 250 OP 250 PS 637: Performed by: PHYSICIAN ASSISTANT

## 2023-06-16 PROCEDURE — 36410 VNPNXR 3YR/> PHY/QHP DX/THER: CPT | Mod: GC | Performed by: RADIOLOGY

## 2023-06-16 PROCEDURE — 71250 CT THORAX DX C-: CPT

## 2023-06-16 PROCEDURE — 71045 X-RAY EXAM CHEST 1 VIEW: CPT | Mod: 77

## 2023-06-16 PROCEDURE — 250N000011 HC RX IP 250 OP 636: Performed by: STUDENT IN AN ORGANIZED HEALTH CARE EDUCATION/TRAINING PROGRAM

## 2023-06-16 PROCEDURE — 99233 SBSQ HOSP IP/OBS HIGH 50: CPT | Mod: 24 | Performed by: INTERNAL MEDICINE

## 2023-06-16 PROCEDURE — C1769 GUIDE WIRE: HCPCS

## 2023-06-16 PROCEDURE — 200N000002 HC R&B ICU UMMC

## 2023-06-16 PROCEDURE — 85027 COMPLETE CBC AUTOMATED: CPT | Performed by: INTERNAL MEDICINE

## 2023-06-16 PROCEDURE — 85384 FIBRINOGEN ACTIVITY: CPT | Performed by: PHYSICIAN ASSISTANT

## 2023-06-16 PROCEDURE — 97530 THERAPEUTIC ACTIVITIES: CPT | Mod: GO | Performed by: OCCUPATIONAL THERAPIST

## 2023-06-16 PROCEDURE — 258N000003 HC RX IP 258 OP 636: Performed by: STUDENT IN AN ORGANIZED HEALTH CARE EDUCATION/TRAINING PROGRAM

## 2023-06-16 PROCEDURE — 85610 PROTHROMBIN TIME: CPT | Performed by: SURGERY

## 2023-06-16 PROCEDURE — 99291 CRITICAL CARE FIRST HOUR: CPT | Performed by: STUDENT IN AN ORGANIZED HEALTH CARE EDUCATION/TRAINING PROGRAM

## 2023-06-16 PROCEDURE — 81001 URINALYSIS AUTO W/SCOPE: CPT | Performed by: INTERNAL MEDICINE

## 2023-06-16 PROCEDURE — 36415 COLL VENOUS BLD VENIPUNCTURE: CPT | Performed by: TRANSPLANT SURGERY

## 2023-06-16 PROCEDURE — 71045 X-RAY EXAM CHEST 1 VIEW: CPT | Mod: 26 | Performed by: RADIOLOGY

## 2023-06-16 PROCEDURE — 250N000011 HC RX IP 250 OP 636: Performed by: INTERNAL MEDICINE

## 2023-06-16 PROCEDURE — 92526 ORAL FUNCTION THERAPY: CPT | Mod: GN

## 2023-06-16 PROCEDURE — 250N000013 HC RX MED GY IP 250 OP 250 PS 637: Performed by: NURSE PRACTITIONER

## 2023-06-16 PROCEDURE — 250N000013 HC RX MED GY IP 250 OP 250 PS 637: Performed by: SURGERY

## 2023-06-16 PROCEDURE — 87040 BLOOD CULTURE FOR BACTERIA: CPT | Performed by: TRANSPLANT SURGERY

## 2023-06-16 PROCEDURE — 250N000012 HC RX MED GY IP 250 OP 636 PS 637: Performed by: INTERNAL MEDICINE

## 2023-06-16 PROCEDURE — 258N000003 HC RX IP 258 OP 636: Performed by: TRANSPLANT SURGERY

## 2023-06-16 PROCEDURE — 85520 HEPARIN ASSAY: CPT

## 2023-06-16 PROCEDURE — 87086 URINE CULTURE/COLONY COUNT: CPT | Performed by: INTERNAL MEDICINE

## 2023-06-16 PROCEDURE — 250N000013 HC RX MED GY IP 250 OP 250 PS 637

## 2023-06-16 PROCEDURE — 36569 INSJ PICC 5 YR+ W/O IMAGING: CPT | Mod: 52

## 2023-06-16 PROCEDURE — 272N000504 HC NEEDLE CR4

## 2023-06-16 PROCEDURE — 250N000013 HC RX MED GY IP 250 OP 250 PS 637: Performed by: STUDENT IN AN ORGANIZED HEALTH CARE EDUCATION/TRAINING PROGRAM

## 2023-06-16 PROCEDURE — 82247 BILIRUBIN TOTAL: CPT | Performed by: SURGERY

## 2023-06-16 PROCEDURE — 250N000009 HC RX 250: Performed by: INTERNAL MEDICINE

## 2023-06-16 PROCEDURE — 250N000011 HC RX IP 250 OP 636: Performed by: SURGERY

## 2023-06-16 PROCEDURE — 82248 BILIRUBIN DIRECT: CPT | Performed by: SURGERY

## 2023-06-16 PROCEDURE — C1751 CATH, INF, PER/CENT/MIDLINE: HCPCS

## 2023-06-16 PROCEDURE — 80197 ASSAY OF TACROLIMUS: CPT | Performed by: NURSE PRACTITIONER

## 2023-06-16 PROCEDURE — 71250 CT THORAX DX C-: CPT | Mod: 26 | Performed by: RADIOLOGY

## 2023-06-16 PROCEDURE — 82330 ASSAY OF CALCIUM: CPT | Performed by: INTERNAL MEDICINE

## 2023-06-16 PROCEDURE — 250N000009 HC RX 250: Performed by: STUDENT IN AN ORGANIZED HEALTH CARE EDUCATION/TRAINING PROGRAM

## 2023-06-16 PROCEDURE — 250N000009 HC RX 250: Performed by: TRANSPLANT SURGERY

## 2023-06-16 PROCEDURE — 999N000015 HC STATISTIC ARTERIAL MONITORING DAILY

## 2023-06-16 RX ORDER — SENNOSIDES 8.6 MG
8.6 TABLET ORAL DAILY PRN
Status: DISCONTINUED | OUTPATIENT
Start: 2023-06-16 | End: 2023-06-18

## 2023-06-16 RX ORDER — HEPARIN SODIUM,PORCINE 10 UNIT/ML
5 VIAL (ML) INTRAVENOUS
Status: COMPLETED | OUTPATIENT
Start: 2023-06-16 | End: 2023-06-16

## 2023-06-16 RX ORDER — LIDOCAINE HYDROCHLORIDE 10 MG/ML
1-30 INJECTION, SOLUTION EPIDURAL; INFILTRATION; INTRACAUDAL; PERINEURAL
Status: COMPLETED | OUTPATIENT
Start: 2023-06-16 | End: 2023-06-16

## 2023-06-16 RX ORDER — MIDODRINE HYDROCHLORIDE 5 MG/1
10 TABLET ORAL ONCE
Status: COMPLETED | OUTPATIENT
Start: 2023-06-16 | End: 2023-06-16

## 2023-06-16 RX ORDER — LIDOCAINE 40 MG/G
CREAM TOPICAL
Status: ACTIVE | OUTPATIENT
Start: 2023-06-16 | End: 2023-06-19

## 2023-06-16 RX ORDER — MIDODRINE HYDROCHLORIDE 5 MG/1
20 TABLET ORAL EVERY 8 HOURS
Status: DISCONTINUED | OUTPATIENT
Start: 2023-06-16 | End: 2023-06-21

## 2023-06-16 RX ORDER — SENNOSIDES 8.6 MG
8.6 TABLET ORAL DAILY
Status: DISCONTINUED | OUTPATIENT
Start: 2023-06-16 | End: 2023-06-16

## 2023-06-16 RX ORDER — VIT B COMP NO.3/FOLIC/C/BIOTIN 1 MG-60 MG
1 TABLET ORAL DAILY
Status: DISCONTINUED | OUTPATIENT
Start: 2023-06-17 | End: 2023-06-18

## 2023-06-16 RX ADMIN — INSULIN ASPART 1 UNITS: 100 INJECTION, SOLUTION INTRAVENOUS; SUBCUTANEOUS at 20:24

## 2023-06-16 RX ADMIN — SULFAMETHOXAZOLE AND TRIMETHOPRIM 1 TABLET: 400; 80 TABLET ORAL at 20:30

## 2023-06-16 RX ADMIN — PREDNISONE 10 MG: 10 TABLET ORAL at 07:46

## 2023-06-16 RX ADMIN — MIDODRINE HYDROCHLORIDE 20 MG: 5 TABLET ORAL at 13:29

## 2023-06-16 RX ADMIN — CALCIUM CHLORIDE, MAGNESIUM CHLORIDE, SODIUM CHLORIDE, SODIUM BICARBONATE, POTASSIUM CHLORIDE AND SODIUM PHOSPHATE DIBASIC DIHYDRATE 12.5 ML/KG/HR: 3.68; 3.05; 6.34; 3.09; .314; .187 INJECTION INTRAVENOUS at 18:08

## 2023-06-16 RX ADMIN — MICAFUNGIN SODIUM 100 MG: 50 INJECTION, POWDER, LYOPHILIZED, FOR SOLUTION INTRAVENOUS at 00:06

## 2023-06-16 RX ADMIN — INSULIN ASPART 1 UNITS: 100 INJECTION, SOLUTION INTRAVENOUS; SUBCUTANEOUS at 04:29

## 2023-06-16 RX ADMIN — SODIUM BICARBONATE 650 MG: 650 TABLET ORAL at 20:07

## 2023-06-16 RX ADMIN — CALCIUM CHLORIDE, MAGNESIUM CHLORIDE, SODIUM CHLORIDE, SODIUM BICARBONATE, POTASSIUM CHLORIDE AND SODIUM PHOSPHATE DIBASIC DIHYDRATE 12.5 ML/KG/HR: 3.68; 3.05; 6.34; 3.09; .314; .187 INJECTION INTRAVENOUS at 20:30

## 2023-06-16 RX ADMIN — ATORVASTATIN CALCIUM 20 MG: 20 TABLET, FILM COATED ORAL at 20:07

## 2023-06-16 RX ADMIN — CALCIUM CHLORIDE, MAGNESIUM CHLORIDE, SODIUM CHLORIDE, SODIUM BICARBONATE, POTASSIUM CHLORIDE AND SODIUM PHOSPHATE DIBASIC DIHYDRATE 12.5 ML/KG/HR: 3.68; 3.05; 6.34; 3.09; .314; .187 INJECTION INTRAVENOUS at 18:07

## 2023-06-16 RX ADMIN — URSODIOL 250 MG: 250 TABLET, FILM COATED ORAL at 07:46

## 2023-06-16 RX ADMIN — METOPROLOL TARTRATE 25 MG: 25 TABLET, FILM COATED ORAL at 00:06

## 2023-06-16 RX ADMIN — CALCIUM CHLORIDE, MAGNESIUM CHLORIDE, SODIUM CHLORIDE, SODIUM BICARBONATE, POTASSIUM CHLORIDE AND SODIUM PHOSPHATE DIBASIC DIHYDRATE 12.5 ML/KG/HR: 3.68; 3.05; 6.34; 3.09; .314; .187 INJECTION INTRAVENOUS at 08:09

## 2023-06-16 RX ADMIN — ASPIRIN 325 MG: 325 TABLET ORAL at 07:46

## 2023-06-16 RX ADMIN — INSULIN ASPART 1 UNITS: 100 INJECTION, SOLUTION INTRAVENOUS; SUBCUTANEOUS at 00:35

## 2023-06-16 RX ADMIN — NOREPINEPHRINE BITARTRATE 0.03 MCG/KG/MIN: 1 INJECTION, SOLUTION, CONCENTRATE INTRAVENOUS at 06:03

## 2023-06-16 RX ADMIN — MIDODRINE HYDROCHLORIDE 20 MG: 5 TABLET ORAL at 22:26

## 2023-06-16 RX ADMIN — MIDODRINE HYDROCHLORIDE 10 MG: 5 TABLET ORAL at 08:53

## 2023-06-16 RX ADMIN — SODIUM BICARBONATE 650 MG: 650 TABLET ORAL at 07:46

## 2023-06-16 RX ADMIN — MYCOPHENOLATE MOFETIL 750 MG: 200 POWDER, FOR SUSPENSION ORAL at 20:06

## 2023-06-16 RX ADMIN — METHOCARBAMOL 500 MG: 500 TABLET ORAL at 22:26

## 2023-06-16 RX ADMIN — CALCIUM CHLORIDE, MAGNESIUM CHLORIDE, SODIUM CHLORIDE, SODIUM BICARBONATE, POTASSIUM CHLORIDE AND SODIUM PHOSPHATE DIBASIC DIHYDRATE 12.5 ML/KG/HR: 3.68; 3.05; 6.34; 3.09; .314; .187 INJECTION INTRAVENOUS at 04:23

## 2023-06-16 RX ADMIN — CALCIUM CHLORIDE, MAGNESIUM CHLORIDE, SODIUM CHLORIDE, SODIUM BICARBONATE, POTASSIUM CHLORIDE AND SODIUM PHOSPHATE DIBASIC DIHYDRATE 12.5 ML/KG/HR: 3.68; 3.05; 6.34; 3.09; .314; .187 INJECTION INTRAVENOUS at 12:23

## 2023-06-16 RX ADMIN — MIDODRINE HYDROCHLORIDE 10 MG: 5 TABLET ORAL at 07:46

## 2023-06-16 RX ADMIN — Medication 5 ML: at 07:47

## 2023-06-16 RX ADMIN — CALCIUM CHLORIDE, MAGNESIUM CHLORIDE, SODIUM CHLORIDE, SODIUM BICARBONATE, POTASSIUM CHLORIDE AND SODIUM PHOSPHATE DIBASIC DIHYDRATE 12.5 ML/KG/HR: 3.68; 3.05; 6.34; 3.09; .314; .187 INJECTION INTRAVENOUS at 00:06

## 2023-06-16 RX ADMIN — LIDOCAINE HYDROCHLORIDE 1 ML: 10 INJECTION, SOLUTION EPIDURAL; INFILTRATION; INTRACAUDAL; PERINEURAL at 18:14

## 2023-06-16 RX ADMIN — INSULIN ASPART 2 UNITS: 100 INJECTION, SOLUTION INTRAVENOUS; SUBCUTANEOUS at 08:26

## 2023-06-16 RX ADMIN — Medication 40 MG: at 07:46

## 2023-06-16 RX ADMIN — CALCIUM GLUCONATE 2 G: 20 INJECTION, SOLUTION INTRAVENOUS at 22:26

## 2023-06-16 RX ADMIN — SODIUM BICARBONATE 650 MG: 650 TABLET ORAL at 13:29

## 2023-06-16 RX ADMIN — OXYCODONE HYDROCHLORIDE 2.5 MG: 5 TABLET ORAL at 13:29

## 2023-06-16 RX ADMIN — INSULIN ASPART 2 UNITS: 100 INJECTION, SOLUTION INTRAVENOUS; SUBCUTANEOUS at 12:04

## 2023-06-16 RX ADMIN — Medication 5 ML: at 18:12

## 2023-06-16 RX ADMIN — METOPROLOL TARTRATE 25 MG: 25 TABLET, FILM COATED ORAL at 07:46

## 2023-06-16 RX ADMIN — VALGANCICLOVIR HYDROCHLORIDE 450 MG: 50 POWDER, FOR SOLUTION ORAL at 20:09

## 2023-06-16 RX ADMIN — INSULIN ASPART 2 UNITS: 100 INJECTION, SOLUTION INTRAVENOUS; SUBCUTANEOUS at 16:10

## 2023-06-16 RX ADMIN — URSODIOL 250 MG: 250 TABLET, FILM COATED ORAL at 20:07

## 2023-06-16 RX ADMIN — HEPARIN SODIUM 900 UNITS/HR: 10000 INJECTION, SOLUTION INTRAVENOUS at 00:06

## 2023-06-16 RX ADMIN — HEPARIN SODIUM 1300 UNITS/HR: 10000 INJECTION, SOLUTION INTRAVENOUS at 22:26

## 2023-06-16 RX ADMIN — TACROLIMUS 1.5 MG: 5 CAPSULE ORAL at 07:47

## 2023-06-16 RX ADMIN — CALCIUM CHLORIDE, MAGNESIUM CHLORIDE, SODIUM CHLORIDE, SODIUM BICARBONATE, POTASSIUM CHLORIDE AND SODIUM PHOSPHATE DIBASIC DIHYDRATE 12.5 ML/KG/HR: 3.68; 3.05; 6.34; 3.09; .314; .187 INJECTION INTRAVENOUS at 20:28

## 2023-06-16 RX ADMIN — METHOCARBAMOL 500 MG: 500 TABLET ORAL at 14:42

## 2023-06-16 RX ADMIN — VANCOMYCIN HYDROCHLORIDE 1500 MG: 10 INJECTION, POWDER, LYOPHILIZED, FOR SOLUTION INTRAVENOUS at 20:43

## 2023-06-16 RX ADMIN — MYCOPHENOLATE MOFETIL 750 MG: 200 POWDER, FOR SUSPENSION ORAL at 07:47

## 2023-06-16 RX ADMIN — CALCIUM GLUCONATE 2 G: 20 INJECTION, SOLUTION INTRAVENOUS at 06:03

## 2023-06-16 ASSESSMENT — ACTIVITIES OF DAILY LIVING (ADL)
ADLS_ACUITY_SCORE: 40
ADLS_ACUITY_SCORE: 38
ADLS_ACUITY_SCORE: 40

## 2023-06-16 NOTE — PROGRESS NOTES
BRIEF IR NOTE    IR consulted for NTCVC placement. Please see original consult note from today.    At the time of obtaining consent with patient's family, we were contacted by the ICU team stating that they did not have time to remove the line from the patient.  After extensive discussion with the team, we agreed to place a PICC line today and the ulcer line will be removed after the line placement.  Per primary team, PICC line acceptable for the patient to de-escalate cares as needed on the floor. We made clear that we would not remove the potentially opposition on the line due to the risk of bleeding and the line was not placed by IR in the first place.    This was agreed upon with the primary team and we will proceed with PICC the left arm.  If, for any reason, the line cannot be Into the central veins due to the existing left-sided central line, we will leave the PICC line as a midline catheter with plan for the primary team to remove the NTCVC.    Discussed with IR staff, Dr. Sweeney.     Fuad Chang MD  Interventional Radiology Fellow, PGY-6.

## 2023-06-16 NOTE — PROGRESS NOTES
Patient has R dialysis internal jugular and Art line on upper R arm . Unable to use R arm . I attempt The L arm and unable to pass catheter on axillary area . So Patient referred to IR

## 2023-06-16 NOTE — IR NOTE
Patient Name: Damion Quinones  Medical Record Number: [id number]  Today's Date: 6/16/2023    Procedure: peripherally inserted central catheter (midline)  Proceduralist: Dr LOVE Sweeney, Dr ERYN Chang    Sedation Notes: lidocaine used at access site     Procedure start time: 1800  Procedure end time: 1815    Report given to: Chandler PADGETT 4A  : none    Other Notes: Pt arrived to IR room 5 from 2A. Consent reviewed, pt confirmed. Pt denies any questions or concerns regarding procedure. Pt positioned supine and monitored per protocol. Left upper extremity site cleansed and dressed per protocol. Pt tolerated procedure without any noted complications. Pt transferred back to . Line placed under fluoroscopy and placement confirmed, heparin locked per MAR. Ready to use.

## 2023-06-16 NOTE — PROGRESS NOTES
SURGICAL ICU PROGRESS NOTE  Consult/Transfer Note   2023      PRIMARY TEAM: Transplant Surgery  PRIMARY PHYSICIAN: Dr. Clifton  REASON FOR ICU ADMISSION/CONSULT: Close hemodynamic monitoring, CRRT  ADMITTING PHYSICIAN: Dr. Nowak   Date of Service (when I saw the patient): 2023    ASSESSMENT:  Damion Quinones is a 67 year old male with a past medical history significant for decompensated alcohol related + hereditary hemochromatosis (homozygous H63D) cirrhosis complicated by variceal bleeding s/p TIPS (10/1/2022) and hepatic encephalopathy. PMHx also includes coronary artery disease, alcohol overuse, obesity, ESRD on HD -- (IgA nephropathy + ANCA vasculitis), psoriatic arthritis, paroxysmal atrial fibrillation (on warfarin), DVT, recurrent c diff, and HTN.  He is now s/p  donor liver and kidney transplant on 23 with Dr. Clifton. He was discharged from the SICU on  and readmitted on  due to the need for CRRT per nephrology.    Interval Events   1U of pRBC administered yesterday afternoon with appropriate Hgb response. No other events overnight.     PLAN:    Updates today (2023)  - CT Chest to evaluate left neck CVC -> line is in left internal jugular, however, indeterminate position of tip of catheter    - IR consulted to adjust catheter; Would not adjust CVC that SICU placed    - PICC line this afternoon scheduled; Then remove CVC with serial CXR following removal   - Increase midodrine to 20 mg TID  - Add fiber for loose stools   - Vancomycin stop date is   - Discontinue Mucomyst   - Swallow evaluation by SLP -> passed and able to start regular diet with thin liquids   - Continue to hold phosphate binder (calcium acetate) while on CRRT  - Heparin drip increased to 1000 units/hr, managed by Transplant  - Goal Xa value 0.15-0.2    - Monitor with labs q6h   - Continue pulmonary toileting   - Continue to wean pressors     Neurological:  # Acute post-surgical pain    # Hx Alcohol use disorder, sober since 2016  - Monitor neurological status. Delirium preventions and precautions  - Sedation plan: None indicated  - Pain control: PRN oxycodone 2.5-5 mg q4h, robaxin 500 q4h PRN    # Aphasia, right sided gaze preference, inability to follow commands, resolved  # Encephalopathy  - Stroke code called ~0642 6/12  - Appreciate neurology evaluation and assistance  - Head CT w/o contrast with no acute intracranial pathology   - MRA head/neck (w/o contrast) with no acute pathology related to presentation  - vEEG showing encephalopathy and negative for epileptiform activity   - Per discussion with patient's wife, symptoms are consistent with prior episodes of encephalopathy with increased ammonia levels (including gaze preference). Ammonia 18 6/12.   - Neurologic status improving, continue with stimulation and activity. If no improvement with resolution of uremia, consider replacing tacrolimus with cyclosporine per transplant.    - BUN 73.3 > 43.2 > 37.4     Pulmonary:   # Post operative ventilatory support, resolved  # Acute hypoxic respiratory failure requiring mechanical ventilation, resolved  # Bilateral small pleural effusions  - Successfully extubated 6/7 to NC. Maintaining oxygen saturations on room air.   - Aggressive pulmonary hygiene, IS, encourage OOB  - Supplemental O2 as needed to maintain SpO2 > 92%  - Continue chest physiotherapy     Cardiovascular:    #Hx pAfib on PTA warfarin  # Afib with RVR, improving  #Hx CAD  #Hx HTN  #Hypotension, orthostatic  #Shock, likely septic- improving  - ECHO 6/5/23: afib, LVEF 55-60%  - MAP goal 60-85, SBP goal 110-160. 6/10 peripheral levophed started for persistent hypotension, off since 0100 6/12. Restarted 8 pm on 6/14. Wean as able.   - LIJ CVC placed 6/15; location confirmed with CXR and arterial and venous blood gases   - CT chest showed catheter in left internal jugular vein with indeterminate position of tip. IR consulted to  adjust  position.   - Increase midodrine to 20 mg TID    - PTA metoprolol succinate ER 25 daily. Increased to 25mg q8h on 6/13.   - metoprolol 5 mg IV PRN for sustained tachycardia >120  - Continue to hold PTA warfarin  - continue ASA 81 mg daily, PTA atorvastatin hold until outpatient  - Cardiology consulted; Signed off 6/11    -- TTE completed, EF 55-60%, normal ventricular function   -- Allow for permissive tachycardia, long term goal HR < 110, okay for spikes to 120-130s   -- Anticoagulation for Afib when safe from a surgical standpoint,     -- Heparin gtt post IR procedure per transplant; Start at 600 u/hr non-titratable per transplant attending. Increased to 1000 units/hour.      - Discussed with Transplant changing heparin gtt plan to standard ICU nursing protocol. Transplant would like to continue with non-titratable drip with q6h Xa labs to assess for needed dose adjustments.     Gastroenterology/Nutrition:  # Post-operative ileus, resolved  # At risk for malnutrition  # Hx of decompensated alcohol related + hereditary hemochromatosis (homozygous H63D) cirrhosis (MELD-Na 30) complicated by variceal bleeding s/p TIPS (10/1/2022) and hepatic encephalopathy now s/p DDLT 6/6  # Direct hyperbilirubinemia  - 6/7 repeat liver US: persistent low resistance waveforms and low resistive indices throughout hepatic artery system concerning for ischemia, stable velocities and upstroke  - LFTS overall trending down, TBili 1.6 (2.2), dBili 1.29 (1.89), alk phos 261 (235), AST 62 (72),  (130)   - MAE drain with serosanguinous output. Output in last 24 hours: 16 cc   -  mg daily   - Ursodiol 250 mg BID   - Post-pyloric feeding tube placed by radiology 6/12  - Nutrition consulted and following, appreciate input.    - Bowel regimen, senna PRN   - PPI (Protonix)  - SLP evaluation 6/16. Recommendation of regular diet with thin liquids.    Renal/ Fluids/Electrolytes:   #Lactic acidosis, resolved  # Hx ESRD on HD MWF (IgA  nephropathy + ANCA vasculitis) now s/p DDKT 23  # Delayed graft function, improving  - Expect some delayed graft function with  donor kidney, gradually increasing urine output over the past few days, responsive to diuretics  - transplant nephrology consulted and following, appreciate expertise  - Kidney ultrasound  with patent vessels. Renal ultrasound  without stenosis.    - CRRT discontinued 6/10, dialysis line removed due to positive blood cultures. Nephrology recommending CRRT be resumed () instead of HD due to tenous hemodynamics. IR placed line. Currently running CRRT with Is equal Os.   - Lux in place, continue for close UOP monitoring  - No diureses today. 281 ml of UOP since midnight. 749 ml of UOP yesterday.  - Cr 3.83>> 4.16 > 5.53>>3.14 > 2.33 > 1.81 >> 1.59  # hyperphosphatemia  - Phos 5.7> 7.7> 8.7> 8.5> 8.2> 5.5> 4.1> 3.3  # hypocalcemia  - ionized Ca 4.2> 4.3> 4.3> 4.1> 4.6 > 4.4 > 4.2  # hypermagnesemia    - Mg 2.9> 2.8> 2.9> 2.2 > 2.0 > 2.1 > 2.2  # Hyperbicarbonatemia, metabolic acidosis  - Worsening bicarb levels last several days. CO2 18 today.   - Sodium bicarb 650 mg TID started per nephrology       Endocrine:  # Stress hyperglycemia, improving  -Currently on high correction ISS. Goal to keep BG< 180 for optimal wound healing      ID:  # Stress leukocytosis, improving  # Post-transplant ppx per transplant  - WBC 14.8 from 11.6. Afebrile.   -Perioperative antibiotics: Zosyn x 48 hrs (completed ), micafungin x 14 days  -Immunosuppression per transplant: no further doses of thymoglobulin, 1.5 mg BID oral tacrolimus, 10 mg oral prednisone taper to 5 mg for chronic use,  mg BID  -Prophylactic regimen: Valganciclovir, Bactrim Ppx     # Enterococcus bacteremia  # Septic shock, resolved  -  blood cultures x2 growing E. Faecium. 6/10, ,  blood cx NGTD.   - Transplant ID consulted and following, appreciate recs  - Abx: linezolid and Zosyn stopped  (610-6/12). Vancomycin started 6/12. Continue through 20th per ID recommendations.   - Daily blood cultures until clear. Stopped daily blood cultures 6/13    Heme:     # Acute blood loss anemia   # Anemia of critical illness and chronic renal disease  # Hx hereditary hemochromatosis   # Thrombocytopenia 2/2 liver dysfunction  - Transfusion goals: INR <2, Platelets > 20, Fibrinogen > 200, Hgb >8.0  - Hgb 7.7, Plt 130, INR 1.10   - Restarted heparin drip 6/14, titrating based on Xa levels (goal Xa is 0.15-0.2), currently at 1000 U/hr   - Recheck Xa level q6h   - Using Xa goals titrate heparin per Transplant. Recommended to Transplant using SICU nursing protocols for heparin drip.  Transplant would like to keep their own titration plan, as they are primary we will continue current drip with q6h labs.   - Upper and Lower extremity DVT US 6/15 showed right internal jugular thrombus and no DVTs    Rheumatologic:  #Hx Gout  - Hold PTA prednisone 10mg daily  - Transitioning methylprednisolone 8 mg taper back to prednisone 10 mg 6/13 per transplant. Taper to 5 mg for chronic use.     Musculoskeletal:  # Weakness and deconditioning of critical illness   - Physical and occupational therapy consult, appreciate recommendations. Encourage increased time OOB when mental status appropriate.     Skin:  -Diligent skin care to prevent injury    General Cares/Prophylaxis:    DVT Prophylaxis: Heparin gtt, SCDs  GI Prophylaxis: Protonix BID  Restraints: Restraints for medical healing needed: No    Lines/ tubes/ drains:  - PIVs  - Intraabdominal MAE drain x1   - Axillary arterial line  - CVC REJ (dialysis line)   - CVC LIJ (venous access) remove today  - PICC line place today  - NJ   - Lux Catheter (exchanged 6/15)       Disposition:  - Surgical ICU    Discussed with SICU staff on multidisciplinary team rounds    Leatha Solano, MS4     I was present with the medical student who participated in the service and in the documentation of the  note. I have verified the history and personally performed the physical exam and medical decision making. I agree with the assessment and plan of care as documented in the note.    I personally managed encephalopathy, intake/ouput evaluation and planning, CRRT management, electrolyte derangements, hemodynamics and anticoagulation planning, CVC and PICC management, discussion of care with IR and transplant teams. I spent a total of 30 minutes providing critical care services at the bedside, and on the critical care unit, evaluating the patient, directing care and reviewing laboratory values and radiologic reports for the patient.      Leonel Streeter PA-C  06/16/23  2:14 PM    - - - - - - - - - - - - - - - - - - - - - - - - - - - - - - - - - - - - - - - - - - - - - -   INTERVAL EVENTS/SUBJECTIVE:     1U of pRBC given yesterday evening. Hgb recovered post transfusion. Requiring intermittent 2 L NC.     No complaints of pain or shortness of breath. Interview complicated by mental status.     PHYSICAL EXAMINATION:  Temp:  [97.4  F (36.3  C)-98.9  F (37.2  C)] 97.4  F (36.3  C)  Pulse:  [] 89  Resp:  [11-24] 16  BP: (83)/(69) 83/69  MAP:  [57 mmHg-102 mmHg] 66 mmHg  Arterial Line BP: ()/(17-81) 89/48  SpO2:  [87 %-100 %] 97 %     General: NAD, appears comfortable lying in bed. Interactive.   HEENT: NG and NJ secure in place. PERRL. Jaundice appearance, scleral icterus- decreased. Broken blood vessels in right eye.   Pulm/Resp: Breathing unlabored on 2L NC, equal chest rise, no audible wheezing. Lung sounds clear bilaterally. Slight secretions in upper airway.   CV: irregularly irregular rhythm on monitor, normal rate  Abdomen: distended, chevron incision without drainage, RLQ prevena holding suctoin, MAE x 1 to bulb suction with serosanguinous output, soft reducible umbilical hernia. Bowel sounds present. No pain with palpation.   : stauffer catheter in place with minimal yellow urine output.    MSK/Extremities: significant pitting peripheral edema on feet and ankles, no edema above SCDs, peripheral pulses intact.   Skin: scattered purpura throughout face, chest, abdomen, and arms, jaundiced- decreased, no erythema or streaking   Neuro: Alert, oriented to self, not oriented to time, place, or situation. Answers questions. Follows commands. Cannot give thumbs up.     LABS: Reviewed.   Arterial Blood Gases   Recent Labs   Lab 06/15/23  1723 06/12/23  0535 06/11/23  1833 06/11/23  1244   PH 7.44 7.34* 7.34* 7.34*   PCO2 36 38 39 39   PO2 112* 176* 128* 124*   HCO3 24 20* 21 21     Complete Blood Count   Recent Labs   Lab 06/16/23  1158 06/16/23  0424 06/15/23  1625 06/15/23  1149 06/15/23  0417   WBC 14.8* 13.2*  13.2*  --  11.9* 9.4   HGB 7.7* 8.1*  8.1* 7.0* 7.4* 9.3*   * 133*  133*  --  110* 97*     Basic Metabolic Panel  Recent Labs   Lab 06/16/23  1202 06/16/23  1158 06/16/23  0824 06/16/23  0428 06/16/23  0424 06/15/23  2123 06/15/23  2120 06/15/23  1717 06/15/23  1149   NA  --  138  --   --  136  --  135*  --  137   POTASSIUM  --  3.7  --   --  3.7  --  3.7  --  3.8   CHLORIDE  --  106  --   --  104  --  104  --  103   CO2  --  19*  --   --  20*  --  19*  --  21*   BUN  --  38.0*  --   --  37.4*  --  43.2*  --  57.1*   CR  --  1.47*  --   --  1.59*  --  1.81*  --  2.33*   * 222* 166* 157* 173*   < > 168*   < > 204*    < > = values in this interval not displayed.     Liver Function Tests  Recent Labs   Lab 06/16/23  1158 06/16/23  0424 06/15/23  2120 06/15/23  1149 06/15/23  0417 06/14/23 2118 06/14/23  0357 06/13/23  0351   AST  --  62*  --   --  72*  --  66* 61*   ALT  --  109*  --   --  130*  --  148* 158*   ALKPHOS  --  261*  --   --  235*  --  206* 138*   BILITOTAL  --  1.6*  --   --  2.2*  --  2.1* 2.3*   ALBUMIN 2.1* 2.1* 2.0* 1.9* 2.2*   < > 2.3* 2.4*   INR  --  1.10  --   --  1.03  --  1.06 1.23*    < > = values in this interval not displayed.     Pancreatic Enzymes  No lab  results found in last 7 days.  Coagulation Profile  Recent Labs   Lab 06/16/23  0424 06/15/23  0842 06/15/23  0417 06/14/23  2118 06/14/23  0357 06/13/23  0351   INR 1.10  --  1.03  --  1.06 1.23*   PTT  --  29 30 30 26 27       IMAGING:  Recent Results (from the past 24 hour(s))   XR Chest Port 1 View    Narrative    XR CHEST PORT 1 VIEW  6/15/2023 5:21 PM      HISTORY: Confirm LIJ CVC placement    COMPARISON: 6/15/23    FINDINGS: Left IJ CVC tip projects over the expected left innominate  vein with tip overlying the shadow of the aortic knob, please ensure  that this is venous blood return. Recent CT scan review from 6/10/2023  showed no evidence of a left-sided SVC. Right IJ CVC terminates over  the high SVC. Feeding tube courses beyond the field of view. Continued  widened cardiomediastinal silhouette. Stable streaky perihilar and  basilar opacities. No pneumothorax. No substantial pleural effusion.  Coiling material over the left upper quadrant.       Impression    IMPRESSION:   1.Left IJ CVC tip projects over the left innominate vs. aortic arch,  recommend confirming arterial vs venous placement.  2. Stable streaky perihilar/basilar opacities, pulmonary edema and/or  atelectasis.     I have personally reviewed the examination and initial interpretation  and I agree with the findings.    KEV CHEN MD         SYSTEM ID:  [id number]   CT Chest w/o Contrast    Narrative    CT chest without contrast    INDICATION: Post left IJ central venous catheter placement. Confirm  placement location before removal. Assess left innominate versus  aortic arch.    COMPARISON: Plain film yesterday    FINDINGS: No contrast. Included fat appears unremarkable. Feeding tube  terminates in the distalmost stomach. Right perinephric stranding.  Fluid density along the inferior posterior right hepatic lobe border  may indicate other postprocedural change an possible related small  noninfected fluid collection. Benign gynecomastia  bilaterally. Small  bilateral pleural effusions. Bibasilar atelectasis. Heart upper normal  size. Aortic annular and multiple coronary artery calcifications.  Aorta and pulmonary artery calibers are normal.  Pectoralis muscle atrophy bilaterally especially on the left. Other  shoulder muscle atrophy bilaterally.  Bone detail shows diffuse hepatic skeletal hyperostosis with other  lower cervical degenerative disc disease.  Right IJ catheter tip in the right innominate vein.  Left IJ catheter tip it is not arterial. The tip appears to project at  or just through the vascular wall of the left innominate vein ordering  to a very small feeding venous vessel. Consider fluoroscopic guided  contrast injection before manipulation of the catheter to confirm tip  position. Detail of the lungs shows other minimal edema and  atelectasis in the lungs likely associated with the pleural effusions.  Patchy nodular densities in the left upper lobe anterolaterally  indication other groundglass opacities or foci of  edema/atelectasis/infection.  Airway debris in the trachea distally.  Drainage catheter in right side of abdomen also crossing the midline  anterior to the stomach, this catheter is also incompletely imaged at  its left sided aspect. Embolization material in region of gastric  fundus/distal splenic vasculature.      Impression    IMPRESSION: Indeterminate position of tip above left IJ catheter  either in or against the wall of the innominate vein, within a very  small feeding venous vessel or extravascular in the mediastinal soft  tissues. Consider fluoroscopic guided contrast injection before  catheter manipulation. This does not appear arterial.  Small pleural effusions with associated atelectasis bilaterally.  Subtle left upper lobe patchy subpleural densities may indicate other  foci of edema, atelectasis or infection.  Cardiomegaly. Aortic and coronary vascular calcifications.  Feeding tube tip in gastric  pylorus.  Debris in the distal trachea.  Right IJ catheter tip in right innominate vein.  Drainage catheter in the upper abdomen. Fluid adjacent to the  posterior inferior right hepatic border. This area is incompletely  imaged on this chest CT.    KEV CHEN MD         SYSTEM ID:  [id number]

## 2023-06-16 NOTE — PROGRESS NOTES
CLINICAL NUTRITION SERVICES - BRIEF NOTE     Reason for RD note: Concern for frequent stooling     New Findings/Chart Review:  Nutrition support: via NDT (rads tube): Novasource Renal (or equivalent) @ 40 ml/hr (960 ml) + prosource TF20 TID provides 2160 kcal (27 kcal/kg), 147 g pro (1.9 g/kg), 176 g CHO, 688 ml free water, and 0 g fiber daily     Oral Diet/Intake: NPO per SLP 6/15    Labs and meds reviewed -  Levophed off.  K+ and Phos (previosuly elevated) WNL  Stooling described as constant per RN.  Per team, low concern for CDiff.  CRRT ongoing    Interventions:  - Banatrol TID  - Change nephronex (suspension) to renavite (tablet) in the setting of loose stools    Future/Additional Recommendations:  - Monitor ongoing need for renal formula and consider transition to standard formula if K+ and Phos remain WNL  - Monitor progress with SLP / ability to initiate PO diet    Nutrition will continue to follow per protocol.    Rekha Mcdonough, RD, LD, CNSC  4A SICU RD pager: [phone]  Ascom: [id number]  Weekend/Holiday RD pager [phone]

## 2023-06-16 NOTE — PROCEDURES
[facility]    Procedure: PICC placement    Date/Time: 6/16/2023 6:16 PM    Performed by: Fuad Chang  Authorized by: Fuad Chang Fellow Physician: Charlene  Other(s) attending procedure: Zahra      UNIVERSAL PROTOCOL   Site Marked: NA  Prior Images Obtained and Reviewed:  Yes  Required items: Required blood products, implants, devices and special equipment available    Patient identity confirmed:  Verbally with patient, arm band, provided demographic data and hospital-assigned identification number  Patient was reevaluated immediately before administering moderate or deep sedation or anesthesia  Confirmation Checklist:  Patient's identity using two indicators, relevant allergies, procedure was appropriate and matched the consent or emergent situation and correct equipment/implants were available  Time out: Immediately prior to the procedure a time out was called    Universal Protocol: the Joint Commission Universal Protocol was followed    Preparation: Patient was prepped and draped in usual sterile fashion       ANESTHESIA    Anesthesia: Local infiltration  Local Anesthetic:  Lidocaine 1% without epinephrine      SEDATION    Patient Sedated: No    See dictated procedure note for full details.  Findings: Patent left basilic vein    Specimens: none    Complications: None    Condition: Stable    Plan: Line ready for use as midline catheter. Primary team to remove LIJ NTCVC.       PROCEDURE  Describe Procedure: Placement of DL 5 fr PICC line with tip in left subclavian vein. Line ready for use.   Patient Tolerance:  Patient tolerated the procedure well with no immediate complications  Length of time physician/provider present for 1:1 monitoring during sedation: 0

## 2023-06-16 NOTE — PROGRESS NOTES
[facility]   Transplant Nephrology Progress Note  Date of Admission:  6/5/2023  Today's Date: 06/16/2023    Recommendations:  - continue CRRT, 4 K bath, I=o, he is non oliguric and was diuretic responsive prior to CRRT initiation but clearance was poor and currently remains on pressors  - send DSA per high risk protocol, lower concern with cw but can monitor   - stool cdiff and consider further infectious w/up UA Ucx ,repeat BCx given leukocytosis and hemodynamic instability  - consider stress dose steroids given chronic steroid use prednisone 10 and current hemodynamic instability, pressor need  - MMF increased to 750 mg po bid 6/14, BCx remain neg as of 6/11 on reduced dose since 6/10 but back on pressors, will re-evaluate dose adjustment tomorrow      Assessment & Plan   # DDKT: DGF resume CRRT 6/14 for clearance, non-oliguric   - Baseline Creatinine: ~ TBD   - Proteinuria: Not checked post transplant   - Date DSA Last Checked: Jun/2023      Latest DSA: Low level DSA (<1000 mfi) to CW9,    - BK Viremia: Not checked post transplant   - Kidney Tx Biopsy: No    -Pt was receiving CRRT from 6/6-6/10, stopped and LIJ tunneled line removed due to  bacteremia. 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   - LFTs downtrending   - management per tx surgery    # Immunosuppression: Tacrolimus immediate release (goal 5-8), Mycophenolate mofetil (dose 500 mg every 12 hours) and Prednisone (dose taper) to 10 mg daily    - Induction with Recent Transplant:  rATG total 4mg/kg, stopped due to sepsis   - Changes: increase MMF to 750/750 6/14     # Infection Prophylaxis:   - PJP: Sulfa/TMP (Bactrim)  - CMV: Valganciclovir (Valcyte),CMV IgG Ab positive  - Thrush: Micafungin (Mycamine)  - Fungal: Micafungin (Mycamine)    # History of  C.diff:   -Consider PO vanc with abx, last cdiff screen neg    # E.Faecium bacteremia:   -Diagnosed on 6/9 BlCx. Pending sensitivities. Obtain cultures daily until clearance   -Broad spectrum abx (linezolid, vanc, zosyn x48h) , transplant ID consulted, appreciate recommendations   -Removed tunneled lines and all other lines on 6/10   -BCx NGTD 6/11TTE w/ poor windows was negative on 6/10    # ANCA vasculitis:   -S/p Rituximab x2   -Obtain intermittent U/A     # Paroxysmal A-Fib: On coumadin and metoprolol ER 25 mg daily PTA. Consider switching to eliquis when kidney and liver are functioning.    -Agree with IV metoprolol 5mg q4h     # Hypotension: Hypotensive, on pressors 6/14 Goal BP: MAP > 65   - Volume status: hypervolemic EDW ~ 96.4 kg (on HD)   - Changes: Not at this time.    # Encephalopathy   - aphasia+confusions: code stroke 6/11, CTH unremarkable, MRI brain acute to subacute infarct in the right centrum semiovale but not thought to be related to presentation per neurology..EEG shows slowing consistent  with moderate to severe diffuse encephalopathy.  bacteremia cleared and restarted on CRRT 6/14    # Elevated Blood Glucose: Glucose generally running ~ 160s-200   - Management as per primary team.  On insulin gtt.    # Anemia in Chronic Renal Disease and acute blood loss: Hgb: Trend down      FEDERICO: No    -Transfuse for Hb<7    # Mineral Bone Disorder:   - Secondary renal hyperparathyroidism; PTH level: Minimally elevated ( pg/ml)        On treatment: None  - Vitamin D; level: Not checked recently, but was normal last check        On supplement: No  - Calcium; level: Normal when corrected for low alb       On supplement: No  - Phosphorus; level: High     On binder: No    # Ileus: resolved   -NG removed    # Electrolytes:   - Potassium; level: normal         On supplement: No  - Magnesium; level: High          On supplement: No  - Bicarbonate; level: lwo normal         On supplement: No  - Sodium; level:  Normal     # Gout:    - On prednisone 10 mg PO daily PTA. Currently on prednisone 10 with plan to eventually wean to prednisone 5 once hemoydynamically stable, other immunosuppression dose/trough at goal    # Transplant History:  Etiology of Kidney Failure: IgA nephropathy and ANCA vasculitis  Tx: Liver Tx (SLK)  Transplant: 6/6/2023 (Liver), 6/6/2023 (Kidney)  Significant changes in immunosuppression: None  Significant transplant-related complications: None    Recommendations were communicated to the primary team verbally       Darrel Patino MD   Pager: [phone]    Interval History   Remains on pressors sating 97% on 2 L O2   Mental status significantly improved, more interactive today  C/o R foot pain  non oliguric past 24h ~800 ml UOP, remains on CRRT with I=o      Review of Systems   4 point ROS was obtained and negative except as noted in the Interval History.    MEDICATIONS:    aspirin  325 mg Oral Daily     atorvastatin  20 mg Oral QPM     B and C vitamin Complex with folic acid  5 mL Oral Daily     [Held by provider] calcium acetate (phos binder)  667 mg Oral or Feeding Tube Q6H     heparin  3 mL Intracatheter Q24H     insulin aspart  1-6 Units Subcutaneous Q4H     metoprolol tartrate  25 mg Oral Q8H KARAN     micafungin  100 mg Intravenous Q24H     midodrine  10 mg Oral or Feeding Tube TID w/meals     mycophenolate  750 mg Oral BID IS     pantoprazole  40 mg Per Feeding Tube QAM AC     predniSONE  10 mg Per Feeding Tube Daily     protein modular  1 packet Per Feeding Tube TID     sodium bicarbonate  650 mg Oral or Feeding Tube TID     sodium chloride (PF)  3 mL Intravenous Q8H     sulfamethoxazole-trimethoprim  1 tablet Oral or Feeding Tube Once per day on Mon Wed Fri     tacrolimus  1.5 mg ORAL OR NJ TUBE BID IS     ursodiol  250 mg Oral BID     valGANciclovir  450 mg Oral Daily    Or     valGANciclovir  450 mg Oral or NG Tube Daily     vancomycin  1,500 mg Intravenous Q24H       dextrose       CRRT  "replacement solution 12.5 mL/kg/hr (23)     heparin 1,000 Units/hr (23 0400)     - MEDICATION INSTRUCTIONS -       norepinephrine 0.03 mcg/kg/min (23 0603)     CRRT replacement solution 200 mL/hr at 06/15/23 1659     CRRT replacement solution 12.5 mL/kg/hr (23)       Physical Exam   Temp  Av.9  F (36.6  C)  Min: 97  F (36.1  C)  Max: 99.2  F (37.3  C)  Arterial Line BP  Min: 60/45  Max: 131/80  Arterial Line MAP (mmHg)  Av mmHg  Min: 52 mmHg  Max: 101 mmHg      Pulse  Av.5  Min: 68  Max: 159 Resp  Av.8  Min: 14  Max: 23  FiO2 (%)  Av.3 %  Min: 40 %  Max: 50 %  SpO2  Av.1 %  Min: 92 %  Max: 100 %    CVP (mmHg): 6 mmHgBP (!) 83/69   Pulse 84   Temp 98.1  F (36.7  C) (Oral)   Resp 11   Ht 1.702 m (5' 7\")   Wt 107.1 kg (236 lb 1.8 oz)   SpO2 96%   BMI 36.98 kg/m      Admit Weight: 102.3 kg (225 lb 8 oz)     GENERAL APPEARANCE: alert NAD  RESP: lungs clear to auscultation - no rales, rhonchi or wheezes  CV: regular rhythm, normal rate, no rub, no murmur  EDEMA: ++ LE edema bilaterally  ABDOMEN: soft, nondistended, nontender, bowel sounds normal  MS: extremities normal - no gross deformities noted, no evidence of inflammation in joints, no muscle tenderness  SKIN: no rash  Neuro: alert and interactive  DIALYSIS ACCESS:  None    Data   All labs reviewed by me.  CMP  Recent Labs   Lab 23  0428 23  0424 23  0035 06/15/23  2123 06/15/23  2120 06/15/23  1717 06/15/23  1149 06/15/23  0418 06/15/23  0417 23  0404 23  0357 23  0737 23  0351   NA  --  136  --   --  135*  --  137  --  137   < > 135*   < > 137   POTASSIUM  --  3.7  --   --  3.7  --  3.8  --  3.7   < > 4.6   < > 5.1   CHLORIDE  --  104  --   --  104  --  103  --  103   < > 98   < > 100   CO2  --  20*  --   --  19*  --  21*  --  18*   < > 16*   < > 17*   ANIONGAP  --  12  --   --  12  --  13  --  16*   < > 21*   < > 20*   * 173* 153* 153* 168*   < > " 204*   < > 151*   < > 130*   < > 105*  114*   BUN  --  37.4*  --   --  43.2*  --  57.1*  --  73.3*   < > 111.0*   < > 84.7*   CR  --  1.59*  --   --  1.81*  --  2.33*  --  3.14*   < > 5.53*   < > 4.77*   GFRESTIMATED  --  48*  --   --  41*  --  30*  --  21*   < > 11*   < > 13*   NAZARIO  --  7.8*  --   --  8.0*  --  8.3*  --  8.3*   < > 8.4*   < > 8.0*   MAG  --  2.2  --   --  2.1  --  2.0  --  2.2   < > 2.9*   < > 2.9*   PHOS  --  3.3  --   --  3.2  --  3.2  --  4.1   < > 8.2*   < > 8.7*   PROTTOTAL  --  4.7*  --   --   --   --   --   --  4.7*  --  5.0*  --  4.9*   ALBUMIN  --  2.1*  --   --  2.0*  --  1.9*  --  2.2*   < > 2.3*  --  2.4*   BILITOTAL  --  1.6*  --   --   --   --   --   --  2.2*  --  2.1*  --  2.3*   ALKPHOS  --  261*  --   --   --   --   --   --  235*  --  206*  --  138*   AST  --  62*  --   --   --   --   --   --  72*  --  66*  --  61*   ALT  --  109*  --   --   --   --   --   --  130*  --  148*  --  158*    < > = values in this interval not displayed.     CBC  Recent Labs   Lab 06/16/23  0424 06/15/23  1625 06/15/23  1149 06/15/23  0417 06/14/23  0357   HGB 8.1* 7.0* 7.4* 9.3* 8.2*   WBC 13.2*  --  11.9* 9.4 7.6   RBC 2.69*  --  2.35* 3.07* 2.74*   HCT 25.2*  --  21.8* 28.5* 25.5*   MCV 94  --  93 93 93   MCH 30.1  --  31.5 30.3 29.9   MCHC 32.1  --  33.9 32.6 32.2   RDW 17.1*  --  17.7* 17.6* 17.5*   *  --  110* 97* 89*     INR  Recent Labs   Lab 06/16/23  0424 06/15/23  0842 06/15/23  0417 06/14/23  2118 06/14/23  0357 06/13/23  0351   INR 1.10  --  1.03  --  1.06 1.23*   PTT  --  29 30 30 26 27     ABG  Recent Labs   Lab 06/15/23  1723 06/12/23  0535 06/11/23  1833 06/11/23  1244   PH 7.44 7.34* 7.34* 7.34*   PCO2 36 38 39 39   PO2 112* 176* 128* 124*   HCO3 24 20* 21 21   O2PER 2  2 28 2 5      Urine Studies  Recent Labs   Lab Test 06/10/23  1613 06/05/23  1620 04/07/23  1246 02/13/23  0743   COLOR Orange* Yellow Yellow Yellow   APPEARANCE Cloudy* Clear Clear Clear   URINEGLC 30* Negative  Negative Negative   URINEBILI Negative Negative Negative Negative   URINEKETONE Negative Negative Negative Negative   SG 1.021 1.012 1.011 1.014   UBLD Large* Moderate* Moderate* Negative   URINEPH 5.5 5.5 5.5 5.0   PROTEIN 100* 10* 10* 10*   NITRITE Negative Negative Negative Negative   LEUKEST Large* Negative Negative Negative   RBCU >182* 2 5* 1   WBCU 128* 7* 2 2     No lab results found.  PTH  Recent Labs   Lab Test 05/19/23  0956   PTHI 67*     Iron Studies  Recent Labs   Lab Test 11/22/22  0848   IRON 68   *   IRONSAT 31   HOLA 429*       IMAGING:  All imaging studies reviewed by me.

## 2023-06-16 NOTE — PLAN OF CARE
Major Shift Events: A & O x3/4. Garbled speech with illogical thoughts, but starting to clear up. Remembered a few details overnight. Denies pain. Levophed titrated to keep MAP >65. A fib 80-90's. MAE with very minimal out but incision areas and arms weeping. 2 L nasal cannula. Less secretions. Tube feeding continues at goal with standard FWF. CRRT goal I=O close to meeting. Continuously stooling. Stauffer with a little below normal output. Flushed stauffer several times throughout shift. Heparin increased to 1000 per MD. Next recheck 0830. 1 unit RBC's given.    Plan: Change Levophed to central concentration? PICC placement scheduled today    For vital signs and complete assessments, please see documentation flowsheets.

## 2023-06-16 NOTE — CONSULTS
===============================  08/13/2018   Brigid Bernabe,   75 y.o. female   Last visit [facility]: :8/3/2018   Last visit eye dept. 8/3/2018  [facility]:  Corrected distance visual acuity was 20/25 in the right eye and 20/25 in the left eye.  Tonometry     Tonometry (Applanation, 11:42 AM)       Right Left    Pressure 18 16               Not recorded         Not recorded        Chief Complaint   Patient presents with    LASER RETINOPEXY OS     10 DAY PO CHECK/ LASER RETINOPEXY OS  8/3/18/   pt states that she still sees blood in her eye, floaters., black spots        HPI     LASER RETINOPEXY OS      Additional comments: 10 DAY PO CHECK/ LASER RETINOPEXY OS  8/3/18/   pt   states that she still sees blood in her eye, floaters., black spots              Comments     LASER RETINOPEXY OS 8/3/18          Last edited by Angela Lozano on 8/13/2018 11:42 AM. (History)          ________________  8/13/2018  Problem List Items Addressed This Visit     None      Visit Diagnoses     Post-operative state    -  Primary          .s/p retinopexy to RT OS  Still whispy v heme, but better  rtc 3 -4 weeks to rechck       ===========================     "    Interventional Radiology  [facility] Consult Service Note  06/16/23   11:08 AM    Consult Requested: \"Remove or exchange LIJ CVC under imaging guidance\"    Recommendations/Plan:    -Patient is on IR schedule tentatively for today for a left non-tunneled triple lumen CVC placement. This was placed in SICU yesterday. IR will not remove line because we did not place, but we will place a new nontunneled left CVC once line is pulled.   -Labs WNL for procedure.  Orders entered for procedure. No pre procedure IV antibiotics required. No NPO required.  -Medications to be held include: none  -Consent will be done prior to procedure.     Please contact the IR charge RN at [phone] for estimated time of procedure.     Case and imaging discussed with IR attending, Dr. Brady. Recommendations were reviewed with requesting team.    This is a 67 year old male with past medical history of recent liver and kidney transplantation. Patient is currently requiring CRRT and has a right non-tunneled CVC for dialysis that was placed 6/14. Patient had a left non-tunneled CVC placed by SICU yesterday for pressors. Imaging reveals catheter misplaced. IR will not see this patient until the left CVC is removed from the patient. IR will then place a new line.    Pertinent Imaging Reviewed: CXR from yesterday and CT from today.    Expected date of discharge:  TBD    Vitals:   BP (!) 83/69   Pulse 109   Temp 98.5  F (36.9  C) (Axillary)   Resp 14   Ht 1.702 m (5' 7\")   Wt 107.1 kg (236 lb 1.8 oz)   SpO2 95%   BMI 36.98 kg/m      Pertinent Labs:   Lab Results   Component Value Date    WBC 13.2 (H) 06/16/2023    WBC 13.2 (H) 06/16/2023    WBC 11.9 (H) 06/15/2023     Lab Results   Component Value Date    HGB 8.1 06/16/2023    HGB 8.1 06/16/2023    HGB 7.0 06/15/2023     Lab Results   Component Value Date     06/16/2023     06/16/2023     06/15/2023     Lab Results   Component Value Date    INR 1.10 " 06/16/2023    PTT 29 06/15/2023     Lab Results   Component Value Date    POTASSIUM 3.7 06/16/2023    POTASSIUM 3.7 06/07/2023    POTASSIUM 4.6 08/27/2019        COVID-19 Antibody Results, Testing for Immunity         No data to display            COVID-19 PCR Results        12/16/2022    20:25 1/15/2023    15:05 1/23/2023    06:09 2/13/2023    07:43 4/7/2023    16:30   COVID-19 PCR Results   SARS CoV2 PCR Negative   Negative   Negative   Negative   Negative             6/5/2023    14:09   COVID-19 PCR Results   SARS CoV2 PCR Negative         Juliet Way PA-C  Interventional Radiology  Pager: [phone]

## 2023-06-16 NOTE — PROGRESS NOTES
Picc placement caceled at the arrival to the patient room  . RN verbaly indicated the MD chanced the Picc order

## 2023-06-16 NOTE — PROGRESS NOTES
CRRT STATUS NOTE    DATA:  Time:  3:41 AM  Pressures WNL:  YES  Filter Status:  WDL    Problems Reported/Alarms Noted:  none    Supplies Present:  YES    ASSESSMENT:  Patient Net Fluid Balance: 6/15 +533 ml, -66 ml since midnight  Vital Signs:   Temp: 98.9  F (37.2  C) Temp src: Axillary BP: (!) 83/69 Pulse: 91   Resp: 12 SpO2: 100 % O2 Device: Nasal cannula Oxygen Delivery: 2 LPM     Labs:  Na 136 K 3.7 ABG 7.44/36/112/24 Hgb 8.1 post pRBC x1 WBC 13.2 plt 133 iCal 4.2 -2 gm replaced  Goals of Therapy: intake = output    INTERVENTIONS:   None    PLAN:  Continue goals of therapy as tolerated. Contact CRRT RN resource on Vocera @ 'CRRT Resource 1'

## 2023-06-16 NOTE — PROGRESS NOTES
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 RTOR on  with concern for low flow in the renal graft - ex-lap, washout, closure with healthy appearing graft with intact arterial and venous flow.      s/p DBD simultaneous liver kidney transplant on 2023 with complex reconstruction of accessory right hepatic artery. POD #10  * RTOR on  with concern for low flow in the renal graft - ex-lap, washout, closure with healthy appearing graft with intact arterial and venous flow.     Alkaline phosphatase slightly uptrending, but total bilirubin downtrending. AST and ALT slowly downtrending.     -Liver US w/doppler 2023 - low waveforms and low resistive indices, but stable velocities and upstrokes.   Stent: No biliary stent in place. Ursodiol 250mg BID.  Drains: 1 MAE drain in place with serosanguinous output; output in the last 24 hours: 42  - ASA 325mg daily      H/o CKD related IgA nephropathy and ANCA vasculitis s/p  donor renal transplant on 2023 with ureteral/J stent placement, delayed graft function. Nephrology consulted + following. Latest renal US with doppler  with faster than expected iliac vein velocity above the anastomosis may represent edema and elevated superior arcuate artery resistive index.  UOP in the last 24 hours: 880. CRRT 1.68L. Serum creatinine and BUN improving.   - s/p temporary dialysis line placed ; pending re-positioning  - CRRT re-started  given worsening renal dysfunction, uremia and encephalopathy. Will consider holding RRT today given improving encephalopathy + he has been requiring vasopressors during RRT.   Metabolic acidosis: CO2 20  today. Sodium bicarbonate 650mg TID.   Hyperphosphatemia: Renal replacement as above    Immunosuppressed status secondary to medications:   - Thymoglobulin: s/p 1mg/kg on 6/6, 6/7, 6/8, 6/9. Hold starting 6/10 due to concern for bacteremia and possible sepsis. No further doses of thymoglobulin.   - Steroid taper per protocol, completed, now resumed PTA prednisone 10 mg for rheumatoid arthritis (see below). Will plan to taper down to 5 mg Prednisone for chronic use.   - Tacrolimus 1.5 mg BID, goal 5-8.  - MMF 750mg BID    Hematology:   Acute on chronic anemia: Hgb stable 8-9; last pRBC transition on 6/12/2023 (1u pRBC).   Thrombocytopenia: Likely related to chronic liver disease, slowly improving, 97 this AM.   Leukocytosis: Afebrile, but slightly worsening leukocytosis. CT chest 6/16 with bibasilar atelectasis + small bilateral pleural effusion. Patchy densities in the left upper lobe anterolaterally. Intermittently requiring 2L NC. No other localizing s/sx of infection.   - Follow up: UA to UCx, blood cultures x 2.     Cardiac:   Coronary artery disease: Continue  mg daily. Atorvastatin 20 mg daily (re-started 6/15/2023)     Paroxysmal atrial fibrillation:  Cardiology consulted: recommended low dose beta blockade.              * Metoprolol 25 mg q8h + metoprolol 5mg IV PRN for sustained tachycardia > 120               * Heparin gtt with provider managed Xa goal 0.15-0.2     Hypotension: Baseline hypotension with concern for sepsis with new finding of bacteremia. 6/10 Echo: EF 55-60%.   * Midodrine 10mg TID       * Vasopressors (6/6-6/12, 6/15 - present)     Respiratory:  Acute hypoxic respiratory failure in the setting of post-operative state, RESOLVED: Extubated on 6/7, intermittently requiring supplemental nasal canula. Currently on 2L NC.               * Continue pulmonary toilet and incentive spirometry    GI/Nutrition:   Hepatitis B s Ag +: Noted to be positive pre-transplant on 6/5/23, but not  previously positive (1/26/2023). HBV DNA negative on 6/5/23. No clear at risk behavior. Repeat Hep B s Ag was negative and HBV DNA not detected; suspect 6/5/2023 HBsAg positivity was a false positive.      Mild protein calorie malnutrition: Nasal-enteric feeding tube in place  - TF at 40cc/hr after line adjustment. Nutrition consulted    Post-operative ileus, RESOLVED: Distended stomach and bowel loops noted on 6/10 AXR. NG placed and now removed. Now good stool output which suggests against ileus.      Bowel regimen:    * Given loose stools, plan for fiber supplementation in the setting of tube feeds   * Senna 1 tablet PRN + miralax PRN    Endocrine:  Post-operative elevated blood glucoses: HgA1c 6.1% (6/6/2023)              * Sliding scale insulin prn     Rheumatology:  Psoriatic arthritis: Prior to admission was on prednisone 10mg daily, on prednisone taper as above.               *Goal to discharge on prednisone 5mg; may consider cortisol stimulation test given long term steroid use    : Lux in place for strict I&Os    Neurologic + MSK  Encephalopathy: Altered mental status was first noted 6/11/2023. Neurology consulted. Head CT without evidence of acute intra-cranial pathology. MRI/MRA - small area of diffusion restriction on DWI without correlation on ADC. EEG consistent with severe encephalopathy, but no seizures. Etiology of encephalopathy likely multifactorial: worsening uremia and recent bacteremia (although clearance of bacteremia has not resulted to improvement in mental status). No sedating medications; no recent opioids  Encephalopathy has improved and patient's mental status is back to baseline in the setting of improving uremia; BUN < 40 today.  - Delirium precautions    Acute post operative pain:    * Methocarbamol 500mg q4h PRN   * Oxycodone 2.5-5mgm q4h PRN    H/o gout: Prednisone as above     Deconditioning/Weakness: OT and PT optimization     Alcohol use disorder: Continue sober supports  post transplant. Continue complete sobriety.    ID  Enterococcus Faecium bacteremia: 6/9 blood cultures positive for enterococcus faecium. No vegetations noted on TTE. Urine culture 6/10/23 without any growth. ID consulted.   * Blood cultures from 6/10, 6/11, 6/12 and 6/13 with no growth to date  * Antibiotics: Vancomycin 6/12-6/20; s/p linezolid 6/10-6/12. S/p empiric zosyn 6/10-6/12    Prophylaxis: Mechanical DVT ppx (heparin as above), fall, GI (PPI BID), fungal (micafungin x 14 days), CMV (valganciclovir, CMV IgG Ab +), PJP (bactrim)     Disposition: Ongoing SICU status given plan for CRRT (vs iHD) given hypotension     GEORGE/Fellow/Resident Provider: Lolis Bermudez MD    Faculty: Charleen Garcia MD  _________________________________________________________________    Interval History:  Overnight events: No acute events overnight. Afebrile  - Feeling much better this AM. Some mild discomfort on right foot near big toe in the setting of history of gout  - Breathing comfortably on 2L NC  - Ongoing CRRT requiring norepinephrine   - Passed his swallow evaluation this AM  - Stool output loose in nature, x 4 in the last 24 hours    ROS:   A 10-point review of systems was negative except as noted above.    Meds:    aspirin  325 mg Oral Daily     atorvastatin  20 mg Oral QPM     [Held by provider] calcium acetate (phos binder)  667 mg Oral or Feeding Tube Q6H     fiber modular  1 packet Per Feeding Tube TID     heparin  3 mL Intracatheter Q24H     insulin aspart  1-6 Units Subcutaneous Q4H     metoprolol tartrate  25 mg Oral Q8H KARAN     micafungin  100 mg Intravenous Q24H     midodrine  20 mg Oral or Feeding Tube Q8H     [START ON 6/17/2023] multivitamin RENAL  1 tablet Oral Daily     mycophenolate  750 mg Oral BID IS     pantoprazole  40 mg Per Feeding Tube QAM AC     predniSONE  10 mg Per Feeding Tube Daily     protein modular  1 packet Per Feeding Tube TID     sodium bicarbonate  650 mg Oral or Feeding Tube TID      "sodium chloride (PF)  3 mL Intravenous Q8H     sulfamethoxazole-trimethoprim  1 tablet Oral or Feeding Tube Once per day on Mon Wed Fri     tacrolimus  1.5 mg ORAL OR NJ TUBE BID IS     ursodiol  250 mg Oral BID     valGANciclovir  450 mg Oral Daily    Or     valGANciclovir  450 mg Oral or NG Tube Daily     vancomycin  1,500 mg Intravenous Q24H       Physical Exam:     Admit Weight: 102.3 kg (225 lb 8 oz)    Current vitals:   BP (!) 83/69   Pulse 83   Temp 98.5  F (36.9  C) (Axillary)   Resp 14   Ht 1.702 m (5' 7\")   Wt 107.1 kg (236 lb 1.8 oz)   SpO2 94%   BMI 36.98 kg/m      Vital sign ranges:    Temp:  [97.8  F (36.6  C)-98.9  F (37.2  C)] 98.5  F (36.9  C)  Pulse:  [] 83  Resp:  [11-24] 14  BP: (83)/(69) 83/69  MAP:  [55 mmHg-102 mmHg] 57 mmHg  Arterial Line BP: ()/(17-81) 83/47  SpO2:  [87 %-100 %] 94 %    General Appearance: NAD  Skin: Warm, perfused  Heart: Irregular rhythm, tachycardic   Lungs: Stable on 2L , coughing  Abdomen: Obese. Abdomen soft, appropriately tender. Abdominal incision is clean, dry, and intact. MAE drain in place with serosanguinous drainage  : Lux with dark urine  Extremities: 2+ edema in upper and lower extremities bilaterally   Neurologic: A&Ox 3.     Data:   CMP  Recent Labs   Lab 06/16/23  0824 06/16/23  0428 06/16/23  0424 06/15/23  2123 06/15/23  2120 06/15/23  0418 06/15/23  0417   NA  --   --  136  --  135*   < > 137   POTASSIUM  --   --  3.7  --  3.7   < > 3.7   CHLORIDE  --   --  104  --  104   < > 103   CO2  --   --  20*  --  19*   < > 18*   * 157* 173*   < > 168*   < > 151*   BUN  --   --  37.4*  --  43.2*   < > 73.3*   CR  --   --  1.59*  --  1.81*   < > 3.14*   GFRESTIMATED  --   --  48*  --  41*   < > 21*   NAZARIO  --   --  7.8*  --  8.0*   < > 8.3*   ICAW  --   --  4.2*  --  4.4   < > 4.6   MAG  --   --  2.2  --  2.1   < > 2.2   PHOS  --   --  3.3  --  3.2   < > 4.1   ALBUMIN  --   --  2.1*  --  2.0*   < > 2.2*   BILITOTAL  --   --  1.6*  --   " --   --  2.2*   ALKPHOS  --   --  261*  --   --   --  235*   AST  --   --  62*  --   --   --  72*   ALT  --   --  109*  --   --   --  130*    < > = values in this interval not displayed.     CBC  Recent Labs   Lab 06/16/23  0424 06/15/23  1625 06/15/23  1149   HGB 8.1*  8.1* 7.0* 7.4*   WBC 13.2*  13.2*  --  11.9*   *  133*  --  110*

## 2023-06-16 NOTE — PHARMACY-VANCOMYCIN DOSING SERVICE
Pharmacy Vancomycin Note  Date of Service Jennifer 15, 2023  Patient's  1955   67 year old, male    Indication: Bacteremia  Day of Therapy: 6  Current vancomycin regimen:  1500 mg IV q24h  Current vancomycin monitoring method: Renal Replacement Therapy  Current vancomycin therapeutic monitoring goal: 15-20 mg/L        Current estimated CrCl = Estimated Creatinine Clearance: 36.9 mL/min (A) (based on SCr of 2.33 mg/dL (H)).    Creatinine for last 3 days  2023:  3:51 AM Creatinine 4.77 mg/dL; 12:48 PM Creatinine 4.95 mg/dL  2023:  3:57 AM Creatinine 5.53 mg/dL;  9:18 PM Creatinine 4.51 mg/dL  6/15/2023:  4:17 AM Creatinine 3.14 mg/dL; 11:49 AM Creatinine 2.33 mg/dL    Recent Vancomycin Levels (past 3 days)  2023:  3:57 AM Vancomycin 20.3 ug/mL  6/15/2023:  5:43 PM Vancomycin 15.8 ug/mL    Vancomycin IV Administrations (past 72 hours)                   vancomycin (VANCOCIN) 1,500 mg in 0.9% NaCl 250 mL intermittent infusion (mg) 1,500 mg New Bag 23                Nephrotoxins and other renal medications (From now, onward)    Start     Dose/Rate Route Frequency Ordered Stop    23  vancomycin (VANCOCIN) 1,500 mg in 0.9% NaCl 250 mL intermittent infusion         1,500 mg  over 90 Minutes Intravenous EVERY 24 HOURS 23 1523      23  norepinephrine (LEVOPHED) 4 mg/250 mL NS infusion ADULT (PERIPHERAL)         0.03-0.125 mcg/kg/min × 111.9 kg (Dosing Weight)  12.6-52.5 mL/hr  Intravenous CONTINUOUS 23 1935      23 1800  tacrolimus (GENERIC EQUIVALENT) suspension 1.5 mg         1.5 mg ORAL OR NJ TUBE 2 TIMES DAILY. 23 1032               Contrast Orders - past 72 hours (72h ago, onward)    None          Interpretation of levels and current regimen:  Vancomycin level is reflective of therapeutic level    Has serum creatinine changed greater than 50% in last 72 hours: on CRRT    Urine output:  anuric    Renal Function: CRRT    Plan:  1. Continue Current  Dose  2. Vancomycin monitoring method: Renal Replacement Therapy  3. Vancomycin therapeutic monitoring goal: 15-20 mg/L  4. Pharmacy will check vancomycin levels as appropriate in 1-3 Days.  5. Serum creatinine levels will be ordered daily for the first week of therapy and at least twice weekly for subsequent weeks.    Dami Schwarz, PharmD, BCPS

## 2023-06-17 ENCOUNTER — APPOINTMENT (OUTPATIENT)
Dept: OCCUPATIONAL THERAPY | Facility: CLINIC | Age: 68
End: 2023-06-17
Attending: INTERNAL MEDICINE
Payer: COMMERCIAL

## 2023-06-17 ENCOUNTER — APPOINTMENT (OUTPATIENT)
Dept: SPEECH THERAPY | Facility: CLINIC | Age: 68
End: 2023-06-17
Attending: INTERNAL MEDICINE
Payer: COMMERCIAL

## 2023-06-17 LAB
ALBUMIN SERPL BCG-MCNC: 2.3 G/DL (ref 3.5–5.2)
ALBUMIN SERPL BCG-MCNC: 2.5 G/DL (ref 3.5–5.2)
ALP SERPL-CCNC: 279 U/L (ref 40–129)
ALT SERPL W P-5'-P-CCNC: 95 U/L (ref 0–70)
ANION GAP SERPL CALCULATED.3IONS-SCNC: 11 MMOL/L (ref 7–15)
ANION GAP SERPL CALCULATED.3IONS-SCNC: 15 MMOL/L (ref 7–15)
APTT PPP: 40 SECONDS (ref 22–38)
AST SERPL W P-5'-P-CCNC: 56 U/L (ref 0–45)
BACTERIA BLD CULT: NO GROWTH
BACTERIA BLD CULT: NO GROWTH
BASOPHILS # BLD MANUAL: 0 10E3/UL (ref 0–0.2)
BASOPHILS NFR BLD MANUAL: 0 %
BILIRUB DIRECT SERPL-MCNC: 1.42 MG/DL (ref 0–0.3)
BILIRUB SERPL-MCNC: 1.8 MG/DL
BLD PROD TYP BPU: NORMAL
BLOOD COMPONENT TYPE: NORMAL
BUN SERPL-MCNC: 27.3 MG/DL (ref 8–23)
BUN SERPL-MCNC: 30.3 MG/DL (ref 8–23)
CA-I BLD-MCNC: 4.3 MG/DL (ref 4.4–5.2)
CA-I BLD-MCNC: 4.4 MG/DL (ref 4.4–5.2)
CA-I BLD-MCNC: 4.6 MG/DL (ref 4.4–5.2)
CALCIUM SERPL-MCNC: 7.6 MG/DL (ref 8.8–10.2)
CALCIUM SERPL-MCNC: 8.2 MG/DL (ref 8.8–10.2)
CF REDUC 60M P MA LENFR BLD TEG: 0 % (ref 0–15)
CFT BLD TEG: 1.1 MINUTE (ref 1–3)
CHLORIDE SERPL-SCNC: 102 MMOL/L (ref 98–107)
CHLORIDE SERPL-SCNC: 103 MMOL/L (ref 98–107)
CI (COAGULATION INDEX)(Z) NON NATIVE: 3.2 (ref -3–3)
CLOT ANGLE BLD TEG: 75.3 DEGREES (ref 53–72)
CLOT INIT BLD TEG: 5.9 MINUTE (ref 5–10)
CLOT LYSIS 30M P MA LENFR BLD TEG: 0 % (ref 0–8)
CLOT STRENGTH BLD TEG: 17.8 KD/SC (ref 4.5–11)
CODING SYSTEM: NORMAL
CORTICOSTER 1H P 250 UG ACTH SERPL-SCNC: 23.7 UG/DL
CORTIS SERPL-MCNC: 7.2 UG/DL
CREAT SERPL-MCNC: 1.16 MG/DL (ref 0.67–1.17)
CREAT SERPL-MCNC: 1.19 MG/DL (ref 0.67–1.17)
CROSSMATCH: NORMAL
DEPRECATED HCO3 PLAS-SCNC: 19 MMOL/L (ref 22–29)
DEPRECATED HCO3 PLAS-SCNC: 22 MMOL/L (ref 22–29)
EOSINOPHIL # BLD MANUAL: 0 10E3/UL (ref 0–0.7)
EOSINOPHIL NFR BLD MANUAL: 0 %
ERYTHROCYTE [DISTWIDTH] IN BLOOD BY AUTOMATED COUNT: 17.9 % (ref 10–15)
ERYTHROCYTE [DISTWIDTH] IN BLOOD BY AUTOMATED COUNT: 17.9 % (ref 10–15)
ERYTHROCYTE [DISTWIDTH] IN BLOOD BY AUTOMATED COUNT: 18.1 % (ref 10–15)
ERYTHROCYTE [DISTWIDTH] IN BLOOD BY AUTOMATED COUNT: 18.2 % (ref 10–15)
ERYTHROCYTE [DISTWIDTH] IN BLOOD BY AUTOMATED COUNT: 18.3 % (ref 10–15)
FIBRINOGEN PPP-MCNC: 850 MG/DL (ref 170–490)
FIBRINOGEN PPP-MCNC: 893 MG/DL (ref 170–490)
GFR SERPL CREATININE-BSD FRML MDRD: 68 ML/MIN/1.73M2
GFR SERPL CREATININE-BSD FRML MDRD: 70 ML/MIN/1.73M2
GLUCOSE BLDC GLUCOMTR-MCNC: 104 MG/DL (ref 70–99)
GLUCOSE BLDC GLUCOMTR-MCNC: 141 MG/DL (ref 70–99)
GLUCOSE BLDC GLUCOMTR-MCNC: 168 MG/DL (ref 70–99)
GLUCOSE BLDC GLUCOMTR-MCNC: 169 MG/DL (ref 70–99)
GLUCOSE BLDC GLUCOMTR-MCNC: 183 MG/DL (ref 70–99)
GLUCOSE BLDC GLUCOMTR-MCNC: 232 MG/DL (ref 70–99)
GLUCOSE BLDC GLUCOMTR-MCNC: 313 MG/DL (ref 70–99)
GLUCOSE SERPL-MCNC: 177 MG/DL (ref 70–99)
GLUCOSE SERPL-MCNC: 240 MG/DL (ref 70–99)
HCT VFR BLD AUTO: 22.1 % (ref 40–53)
HCT VFR BLD AUTO: 23.4 % (ref 40–53)
HCT VFR BLD AUTO: 24.5 % (ref 40–53)
HGB BLD-MCNC: 7 G/DL (ref 13.3–17.7)
HGB BLD-MCNC: 7.2 G/DL (ref 13.3–17.7)
HGB BLD-MCNC: 7.3 G/DL (ref 13.3–17.7)
HGB BLD-MCNC: 7.3 G/DL (ref 13.3–17.7)
HGB BLD-MCNC: 7.9 G/DL (ref 13.3–17.7)
INR PPP: 1.14 (ref 0.85–1.15)
ISSUE DATE AND TIME: NORMAL
LYMPHOCYTES # BLD MANUAL: 0.4 10E3/UL (ref 0.8–5.3)
LYMPHOCYTES NFR BLD MANUAL: 3 %
MAGNESIUM SERPL-MCNC: 2 MG/DL (ref 1.7–2.3)
MAGNESIUM SERPL-MCNC: 2.1 MG/DL (ref 1.7–2.3)
MAGNESIUM SERPL-MCNC: 2.2 MG/DL (ref 1.7–2.3)
MCF BLD TEG: 78.1 MM (ref 50–70)
MCH RBC QN AUTO: 29.8 PG (ref 26.5–33)
MCH RBC QN AUTO: 30.3 PG (ref 26.5–33)
MCH RBC QN AUTO: 30.3 PG (ref 26.5–33)
MCH RBC QN AUTO: 30.5 PG (ref 26.5–33)
MCH RBC QN AUTO: 30.7 PG (ref 26.5–33)
MCHC RBC AUTO-ENTMCNC: 30.8 G/DL (ref 31.5–36.5)
MCHC RBC AUTO-ENTMCNC: 31.2 G/DL (ref 31.5–36.5)
MCHC RBC AUTO-ENTMCNC: 31.2 G/DL (ref 31.5–36.5)
MCHC RBC AUTO-ENTMCNC: 31.7 G/DL (ref 31.5–36.5)
MCHC RBC AUTO-ENTMCNC: 32.2 G/DL (ref 31.5–36.5)
MCV RBC AUTO: 95 FL (ref 78–100)
MCV RBC AUTO: 97 FL (ref 78–100)
METAMYELOCYTES # BLD MANUAL: 0.4 10E3/UL
METAMYELOCYTES NFR BLD MANUAL: 3 %
MONOCYTES # BLD MANUAL: 0.3 10E3/UL (ref 0–1.3)
MONOCYTES NFR BLD MANUAL: 2 %
MYELOCYTES # BLD MANUAL: 0.1 10E3/UL
MYELOCYTES NFR BLD MANUAL: 1 %
NEUTROPHILS # BLD MANUAL: 12 10E3/UL (ref 1.6–8.3)
NEUTROPHILS NFR BLD MANUAL: 91 %
NRBC # BLD AUTO: 0.3 10E3/UL
NRBC BLD MANUAL-RTO: 2 %
PHOSPHATE SERPL-MCNC: 3.4 MG/DL (ref 2.5–4.5)
PHOSPHATE SERPL-MCNC: 3.6 MG/DL (ref 2.5–4.5)
PLAT MORPH BLD: ABNORMAL
PLATELET # BLD AUTO: 147 10E3/UL (ref 150–450)
PLATELET # BLD AUTO: 153 10E3/UL (ref 150–450)
PLATELET # BLD AUTO: 153 10E3/UL (ref 150–450)
PLATELET # BLD AUTO: 162 10E3/UL (ref 150–450)
PLATELET # BLD AUTO: 163 10E3/UL (ref 150–450)
POLYCHROMASIA BLD QL SMEAR: SLIGHT
POTASSIUM SERPL-SCNC: 3.9 MMOL/L (ref 3.4–5.3)
POTASSIUM SERPL-SCNC: 4 MMOL/L (ref 3.4–5.3)
PROT SERPL-MCNC: 4.5 G/DL (ref 6.4–8.3)
RBC # BLD AUTO: 2.28 10E6/UL (ref 4.4–5.9)
RBC # BLD AUTO: 2.41 10E6/UL (ref 4.4–5.9)
RBC # BLD AUTO: 2.41 10E6/UL (ref 4.4–5.9)
RBC # BLD AUTO: 2.42 10E6/UL (ref 4.4–5.9)
RBC # BLD AUTO: 2.59 10E6/UL (ref 4.4–5.9)
RBC MORPH BLD: ABNORMAL
SODIUM SERPL-SCNC: 136 MMOL/L (ref 136–145)
SODIUM SERPL-SCNC: 136 MMOL/L (ref 136–145)
TIME OF COSYNTROPIN INJECTION: NORMAL
UFH PPP CHRO-ACNC: 0.18 IU/ML
UFH PPP CHRO-ACNC: <0.1 IU/ML
UNIT ABO/RH: NORMAL
UNIT NUMBER: NORMAL
UNIT STATUS: NORMAL
UNIT TYPE ISBT: 5100
VANCOMYCIN SERPL-MCNC: 14.3 UG/ML
WBC # BLD AUTO: 13.2 10E3/UL (ref 4–11)
WBC # BLD AUTO: 13.2 10E3/UL (ref 4–11)
WBC # BLD AUTO: 14.2 10E3/UL (ref 4–11)
WBC # BLD AUTO: 14.8 10E3/UL (ref 4–11)
WBC # BLD AUTO: 16.3 10E3/UL (ref 4–11)

## 2023-06-17 PROCEDURE — 82330 ASSAY OF CALCIUM: CPT | Performed by: INTERNAL MEDICINE

## 2023-06-17 PROCEDURE — 80053 COMPREHEN METABOLIC PANEL: CPT | Performed by: SURGERY

## 2023-06-17 PROCEDURE — 84450 TRANSFERASE (AST) (SGOT): CPT | Performed by: SURGERY

## 2023-06-17 PROCEDURE — 250N000012 HC RX MED GY IP 250 OP 636 PS 637: Performed by: INTERNAL MEDICINE

## 2023-06-17 PROCEDURE — 85384 FIBRINOGEN ACTIVITY: CPT | Performed by: PHYSICIAN ASSISTANT

## 2023-06-17 PROCEDURE — 200N000002 HC R&B ICU UMMC

## 2023-06-17 PROCEDURE — 97535 SELF CARE MNGMENT TRAINING: CPT | Mod: GO

## 2023-06-17 PROCEDURE — 80202 ASSAY OF VANCOMYCIN: CPT | Performed by: TRANSPLANT SURGERY

## 2023-06-17 PROCEDURE — 85396 CLOTTING ASSAY WHOLE BLOOD: CPT

## 2023-06-17 PROCEDURE — 250N000013 HC RX MED GY IP 250 OP 250 PS 637: Performed by: PHYSICIAN ASSISTANT

## 2023-06-17 PROCEDURE — 84460 ALANINE AMINO (ALT) (SGPT): CPT | Performed by: SURGERY

## 2023-06-17 PROCEDURE — 258N000003 HC RX IP 258 OP 636: Performed by: TRANSPLANT SURGERY

## 2023-06-17 PROCEDURE — 82533 TOTAL CORTISOL: CPT

## 2023-06-17 PROCEDURE — 250N000013 HC RX MED GY IP 250 OP 250 PS 637: Performed by: STUDENT IN AN ORGANIZED HEALTH CARE EDUCATION/TRAINING PROGRAM

## 2023-06-17 PROCEDURE — 250N000011 HC RX IP 250 OP 636: Performed by: TRANSPLANT SURGERY

## 2023-06-17 PROCEDURE — 99233 SBSQ HOSP IP/OBS HIGH 50: CPT | Performed by: INTERNAL MEDICINE

## 2023-06-17 PROCEDURE — 85027 COMPLETE CBC AUTOMATED: CPT | Performed by: INTERNAL MEDICINE

## 2023-06-17 PROCEDURE — 250N000011 HC RX IP 250 OP 636

## 2023-06-17 PROCEDURE — 250N000009 HC RX 250: Performed by: INTERNAL MEDICINE

## 2023-06-17 PROCEDURE — 250N000012 HC RX MED GY IP 250 OP 636 PS 637: Performed by: PHYSICIAN ASSISTANT

## 2023-06-17 PROCEDURE — 250N000011 HC RX IP 250 OP 636: Performed by: INTERNAL MEDICINE

## 2023-06-17 PROCEDURE — 97530 THERAPEUTIC ACTIVITIES: CPT | Mod: GO

## 2023-06-17 PROCEDURE — 85384 FIBRINOGEN ACTIVITY: CPT | Performed by: STUDENT IN AN ORGANIZED HEALTH CARE EDUCATION/TRAINING PROGRAM

## 2023-06-17 PROCEDURE — 258N000003 HC RX IP 258 OP 636: Performed by: SURGERY

## 2023-06-17 PROCEDURE — 85027 COMPLETE CBC AUTOMATED: CPT | Performed by: STUDENT IN AN ORGANIZED HEALTH CARE EDUCATION/TRAINING PROGRAM

## 2023-06-17 PROCEDURE — 250N000013 HC RX MED GY IP 250 OP 250 PS 637: Performed by: NURSE PRACTITIONER

## 2023-06-17 PROCEDURE — 250N000013 HC RX MED GY IP 250 OP 250 PS 637: Performed by: TRANSPLANT SURGERY

## 2023-06-17 PROCEDURE — 82330 ASSAY OF CALCIUM: CPT | Performed by: SURGERY

## 2023-06-17 PROCEDURE — 83735 ASSAY OF MAGNESIUM: CPT | Performed by: INTERNAL MEDICINE

## 2023-06-17 PROCEDURE — 85007 BL SMEAR W/DIFF WBC COUNT: CPT | Performed by: SURGERY

## 2023-06-17 PROCEDURE — 82248 BILIRUBIN DIRECT: CPT | Performed by: SURGERY

## 2023-06-17 PROCEDURE — 92526 ORAL FUNCTION THERAPY: CPT | Mod: GN

## 2023-06-17 PROCEDURE — 85520 HEPARIN ASSAY: CPT

## 2023-06-17 PROCEDURE — 85610 PROTHROMBIN TIME: CPT | Performed by: SURGERY

## 2023-06-17 PROCEDURE — 85520 HEPARIN ASSAY: CPT | Performed by: TRANSPLANT SURGERY

## 2023-06-17 PROCEDURE — 85730 THROMBOPLASTIN TIME PARTIAL: CPT | Performed by: STUDENT IN AN ORGANIZED HEALTH CARE EDUCATION/TRAINING PROGRAM

## 2023-06-17 PROCEDURE — 82024 ASSAY OF ACTH: CPT

## 2023-06-17 PROCEDURE — 250N000013 HC RX MED GY IP 250 OP 250 PS 637

## 2023-06-17 PROCEDURE — 85027 COMPLETE CBC AUTOMATED: CPT | Performed by: SURGERY

## 2023-06-17 PROCEDURE — P9016 RBC LEUKOCYTES REDUCED: HCPCS

## 2023-06-17 PROCEDURE — P9045 ALBUMIN (HUMAN), 5%, 250 ML: HCPCS

## 2023-06-17 PROCEDURE — 84100 ASSAY OF PHOSPHORUS: CPT | Performed by: INTERNAL MEDICINE

## 2023-06-17 PROCEDURE — 250N000009 HC RX 250: Performed by: TRANSPLANT SURGERY

## 2023-06-17 PROCEDURE — 250N000013 HC RX MED GY IP 250 OP 250 PS 637: Performed by: SURGERY

## 2023-06-17 PROCEDURE — 250N000011 HC RX IP 250 OP 636: Performed by: SURGERY

## 2023-06-17 RX ORDER — NICOTINE POLACRILEX 4 MG
15-30 LOZENGE BUCCAL
Status: DISCONTINUED | OUTPATIENT
Start: 2023-06-17 | End: 2023-06-19

## 2023-06-17 RX ORDER — BUMETANIDE 0.25 MG/ML
2 INJECTION INTRAMUSCULAR; INTRAVENOUS 2 TIMES DAILY
Status: COMPLETED | OUTPATIENT
Start: 2023-06-17 | End: 2023-06-17

## 2023-06-17 RX ORDER — METHOCARBAMOL 500 MG/1
500 TABLET, FILM COATED ORAL EVERY 6 HOURS
Status: DISCONTINUED | OUTPATIENT
Start: 2023-06-17 | End: 2023-06-18

## 2023-06-17 RX ORDER — COSYNTROPIN 0.25 MG/ML
250 INJECTION, POWDER, FOR SOLUTION INTRAMUSCULAR; INTRAVENOUS ONCE
Status: COMPLETED | OUTPATIENT
Start: 2023-06-17 | End: 2023-06-17

## 2023-06-17 RX ORDER — DEXTROSE MONOHYDRATE 25 G/50ML
25-50 INJECTION, SOLUTION INTRAVENOUS
Status: DISCONTINUED | OUTPATIENT
Start: 2023-06-17 | End: 2023-06-19

## 2023-06-17 RX ADMIN — CALCIUM CHLORIDE, MAGNESIUM CHLORIDE, SODIUM CHLORIDE, SODIUM BICARBONATE, POTASSIUM CHLORIDE AND SODIUM PHOSPHATE DIBASIC DIHYDRATE 12.5 ML/KG/HR: 3.68; 3.05; 6.34; 3.09; .314; .187 INJECTION INTRAVENOUS at 23:39

## 2023-06-17 RX ADMIN — METHOCARBAMOL 500 MG: 500 TABLET ORAL at 07:33

## 2023-06-17 RX ADMIN — CALCIUM CHLORIDE, MAGNESIUM CHLORIDE, SODIUM CHLORIDE, SODIUM BICARBONATE, POTASSIUM CHLORIDE AND SODIUM PHOSPHATE DIBASIC DIHYDRATE 12.5 ML/KG/HR: 3.68; 3.05; 6.34; 3.09; .314; .187 INJECTION INTRAVENOUS at 08:12

## 2023-06-17 RX ADMIN — URSODIOL 250 MG: 250 TABLET, FILM COATED ORAL at 07:32

## 2023-06-17 RX ADMIN — SODIUM BICARBONATE 650 MG: 650 TABLET ORAL at 07:32

## 2023-06-17 RX ADMIN — MYCOPHENOLATE MOFETIL 750 MG: 200 POWDER, FOR SUSPENSION ORAL at 07:33

## 2023-06-17 RX ADMIN — CALCIUM CHLORIDE, MAGNESIUM CHLORIDE, SODIUM CHLORIDE, SODIUM BICARBONATE, POTASSIUM CHLORIDE AND SODIUM PHOSPHATE DIBASIC DIHYDRATE 12.5 ML/KG/HR: 3.68; 3.05; 6.34; 3.09; .314; .187 INJECTION INTRAVENOUS at 04:13

## 2023-06-17 RX ADMIN — BUMETANIDE 2 MG: 0.25 INJECTION INTRAMUSCULAR; INTRAVENOUS at 20:10

## 2023-06-17 RX ADMIN — HEPARIN SODIUM 1600 UNITS/HR: 10000 INJECTION, SOLUTION INTRAVENOUS at 14:21

## 2023-06-17 RX ADMIN — Medication 12.5 MG: at 21:50

## 2023-06-17 RX ADMIN — CALCIUM CHLORIDE, MAGNESIUM CHLORIDE, SODIUM CHLORIDE, SODIUM BICARBONATE, POTASSIUM CHLORIDE AND SODIUM PHOSPHATE DIBASIC DIHYDRATE 12.5 ML/KG/HR: 3.68; 3.05; 6.34; 3.09; .314; .187 INJECTION INTRAVENOUS at 04:16

## 2023-06-17 RX ADMIN — Medication 40 MG: at 07:33

## 2023-06-17 RX ADMIN — CALCIUM CHLORIDE, MAGNESIUM CHLORIDE, SODIUM CHLORIDE, SODIUM BICARBONATE, POTASSIUM CHLORIDE AND SODIUM PHOSPHATE DIBASIC DIHYDRATE 12.5 ML/KG/HR: 3.68; 3.05; 6.34; 3.09; .314; .187 INJECTION INTRAVENOUS at 15:37

## 2023-06-17 RX ADMIN — MIDODRINE HYDROCHLORIDE 20 MG: 5 TABLET ORAL at 06:08

## 2023-06-17 RX ADMIN — SODIUM BICARBONATE 650 MG: 650 TABLET ORAL at 20:03

## 2023-06-17 RX ADMIN — BUMETANIDE 2 MG: 0.25 INJECTION INTRAMUSCULAR; INTRAVENOUS at 11:33

## 2023-06-17 RX ADMIN — CALCIUM CHLORIDE, MAGNESIUM CHLORIDE, SODIUM CHLORIDE, SODIUM BICARBONATE, POTASSIUM CHLORIDE AND SODIUM PHOSPHATE DIBASIC DIHYDRATE 12.5 ML/KG/HR: 3.68; 3.05; 6.34; 3.09; .314; .187 INJECTION INTRAVENOUS at 19:42

## 2023-06-17 RX ADMIN — INSULIN ASPART 1 UNITS: 100 INJECTION, SOLUTION INTRAVENOUS; SUBCUTANEOUS at 08:36

## 2023-06-17 RX ADMIN — METHOCARBAMOL 500 MG: 500 TABLET ORAL at 13:49

## 2023-06-17 RX ADMIN — ONDANSETRON 4 MG: 4 TABLET, ORALLY DISINTEGRATING ORAL at 19:01

## 2023-06-17 RX ADMIN — MYCOPHENOLATE MOFETIL 750 MG: 200 POWDER, FOR SUSPENSION ORAL at 17:59

## 2023-06-17 RX ADMIN — INSULIN ASPART 1 UNITS: 100 INJECTION, SOLUTION INTRAVENOUS; SUBCUTANEOUS at 04:18

## 2023-06-17 RX ADMIN — METOPROLOL TARTRATE 25 MG: 25 TABLET, FILM COATED ORAL at 00:17

## 2023-06-17 RX ADMIN — CALCIUM CHLORIDE, MAGNESIUM CHLORIDE, SODIUM CHLORIDE, SODIUM BICARBONATE, POTASSIUM CHLORIDE AND SODIUM PHOSPHATE DIBASIC DIHYDRATE 12.5 ML/KG/HR: 3.68; 3.05; 6.34; 3.09; .314; .187 INJECTION INTRAVENOUS at 00:21

## 2023-06-17 RX ADMIN — Medication 12.5 MG: at 13:49

## 2023-06-17 RX ADMIN — COSYNTROPIN 250 MCG: 0.25 INJECTION, POWDER, LYOPHILIZED, FOR SOLUTION INTRAMUSCULAR; INTRAVENOUS at 10:43

## 2023-06-17 RX ADMIN — VALGANCICLOVIR HYDROCHLORIDE 450 MG: 50 POWDER, FOR SOLUTION ORAL at 20:04

## 2023-06-17 RX ADMIN — INSULIN ASPART 4 UNITS: 100 INJECTION, SOLUTION INTRAVENOUS; SUBCUTANEOUS at 15:58

## 2023-06-17 RX ADMIN — CALCIUM CHLORIDE, MAGNESIUM CHLORIDE, SODIUM CHLORIDE, SODIUM BICARBONATE, POTASSIUM CHLORIDE AND SODIUM PHOSPHATE DIBASIC DIHYDRATE: 3.68; 3.05; 6.34; 3.09; .314; .187 INJECTION INTRAVENOUS at 21:12

## 2023-06-17 RX ADMIN — CALCIUM CHLORIDE, MAGNESIUM CHLORIDE, SODIUM CHLORIDE, SODIUM BICARBONATE, POTASSIUM CHLORIDE AND SODIUM PHOSPHATE DIBASIC DIHYDRATE 12.5 ML/KG/HR: 3.68; 3.05; 6.34; 3.09; .314; .187 INJECTION INTRAVENOUS at 19:44

## 2023-06-17 RX ADMIN — SODIUM BICARBONATE 650 MG: 650 TABLET ORAL at 13:50

## 2023-06-17 RX ADMIN — ALBUMIN HUMAN 12.5 G: 0.05 INJECTION, SOLUTION INTRAVENOUS at 11:26

## 2023-06-17 RX ADMIN — MIDODRINE HYDROCHLORIDE 20 MG: 5 TABLET ORAL at 21:50

## 2023-06-17 RX ADMIN — MIDODRINE HYDROCHLORIDE 20 MG: 5 TABLET ORAL at 13:49

## 2023-06-17 RX ADMIN — MICAFUNGIN SODIUM 100 MG: 50 INJECTION, POWDER, LYOPHILIZED, FOR SOLUTION INTRAVENOUS at 00:15

## 2023-06-17 RX ADMIN — PREDNISONE 10 MG: 10 TABLET ORAL at 07:32

## 2023-06-17 RX ADMIN — INSULIN ASPART 2 UNITS: 100 INJECTION, SOLUTION INTRAVENOUS; SUBCUTANEOUS at 11:52

## 2023-06-17 RX ADMIN — INSULIN ASPART 1 UNITS: 100 INJECTION, SOLUTION INTRAVENOUS; SUBCUTANEOUS at 00:23

## 2023-06-17 RX ADMIN — ATORVASTATIN CALCIUM 20 MG: 20 TABLET, FILM COATED ORAL at 20:04

## 2023-06-17 RX ADMIN — CALCIUM CHLORIDE, MAGNESIUM CHLORIDE, SODIUM CHLORIDE, SODIUM BICARBONATE, POTASSIUM CHLORIDE AND SODIUM PHOSPHATE DIBASIC DIHYDRATE 12.5 ML/KG/HR: 3.68; 3.05; 6.34; 3.09; .314; .187 INJECTION INTRAVENOUS at 23:41

## 2023-06-17 RX ADMIN — CALCIUM CHLORIDE, MAGNESIUM CHLORIDE, SODIUM CHLORIDE, SODIUM BICARBONATE, POTASSIUM CHLORIDE AND SODIUM PHOSPHATE DIBASIC DIHYDRATE 12.5 ML/KG/HR: 3.68; 3.05; 6.34; 3.09; .314; .187 INJECTION INTRAVENOUS at 11:54

## 2023-06-17 RX ADMIN — VANCOMYCIN HYDROCHLORIDE 1750 MG: 1 INJECTION, POWDER, LYOPHILIZED, FOR SOLUTION INTRAVENOUS at 20:50

## 2023-06-17 RX ADMIN — CALCIUM CHLORIDE, MAGNESIUM CHLORIDE, SODIUM CHLORIDE, SODIUM BICARBONATE, POTASSIUM CHLORIDE AND SODIUM PHOSPHATE DIBASIC DIHYDRATE 12.5 ML/KG/HR: 3.68; 3.05; 6.34; 3.09; .314; .187 INJECTION INTRAVENOUS at 11:55

## 2023-06-17 RX ADMIN — ASPIRIN 325 MG: 325 TABLET ORAL at 07:32

## 2023-06-17 RX ADMIN — B-COMPLEX W/ C & FOLIC ACID TAB 1 MG 1 TABLET: 1 TAB at 07:33

## 2023-06-17 RX ADMIN — NOREPINEPHRINE BITARTRATE 0.02 MCG/KG/MIN: 1 INJECTION, SOLUTION, CONCENTRATE INTRAVENOUS at 12:39

## 2023-06-17 RX ADMIN — URSODIOL 250 MG: 250 TABLET, FILM COATED ORAL at 20:04

## 2023-06-17 RX ADMIN — METOPROLOL TARTRATE 25 MG: 25 TABLET, FILM COATED ORAL at 07:32

## 2023-06-17 RX ADMIN — CALCIUM CHLORIDE, MAGNESIUM CHLORIDE, SODIUM CHLORIDE, SODIUM BICARBONATE, POTASSIUM CHLORIDE AND SODIUM PHOSPHATE DIBASIC DIHYDRATE 12.5 ML/KG/HR: 3.68; 3.05; 6.34; 3.09; .314; .187 INJECTION INTRAVENOUS at 08:14

## 2023-06-17 RX ADMIN — METHOCARBAMOL 500 MG: 500 TABLET ORAL at 20:04

## 2023-06-17 RX ADMIN — CALCIUM GLUCONATE 2 G: 20 INJECTION, SOLUTION INTRAVENOUS at 15:08

## 2023-06-17 RX ADMIN — MICAFUNGIN SODIUM 100 MG: 50 INJECTION, POWDER, LYOPHILIZED, FOR SOLUTION INTRAVENOUS at 23:48

## 2023-06-17 ASSESSMENT — ACTIVITIES OF DAILY LIVING (ADL)
ADLS_ACUITY_SCORE: 36
ADLS_ACUITY_SCORE: 34
ADLS_ACUITY_SCORE: 36
ADLS_ACUITY_SCORE: 36
ADLS_ACUITY_SCORE: 38
ADLS_ACUITY_SCORE: 36
ADLS_ACUITY_SCORE: 36
ADLS_ACUITY_SCORE: 38
ADLS_ACUITY_SCORE: 36
ADLS_ACUITY_SCORE: 36
ADLS_ACUITY_SCORE: 34
ADLS_ACUITY_SCORE: 36

## 2023-06-17 NOTE — PROGRESS NOTES
Shift summary 0700 to 1930 today  Neuro: Pt alert and oriented x4 today . PERRL, BUNN weakly. Ankles and knees tender. Oxy 2.5 mg x1 today, good relief with robaxin dose.   CV: Pulses weak in BUE. Edematousand ecchymotic throughout. Midline placed LUE today and LIJ removed by SICU fellow. Low dose norepi running through peripheral IV. Heparin gtt @1300 per Transplant. Xa still subtherapeutic.   Pulm: Sats well on 2 litres Nasal cannula. Lungs coarse, productive cough.   GI: Tube feeds at goal. BMx 2 this shift. Tolerates regular diet.   : Meets CRRT goal I=O, urine 10cc/hr via stauffer. Sample sent for UA/UC.   Skin: Remains weepy particularly in BUE, keeping arms wrapped in chux for containment.   Continue plan of care.

## 2023-06-17 NOTE — PROGRESS NOTES
CRRT STATUS NOTE    DATA:  Time:  6:30 AM  Pressures WNL:  YES  Filter Status:  WDL    Problems Reported/Alarms Noted:  None.    Supplies Present:  YES    ASSESSMENT:  Patient Net Fluid Balance:  Net +107 mL @ 0600.  Vitals: T 97.6, HR 88, RR 12, BP 95/57, MAP 70.  Labs: K 3.7, Mg 2.1,Phos 3.6, iCa 4.4 ,Hgb 7.3, Plt 153   Goals of Therapy: I=O    INTERVENTIONS:   None.    PLAN:  Continue to monitor circuit daily and change set q72 hours or PRN for clotting/clogging. Please call CRRT RN with any quesitons/problems.

## 2023-06-17 NOTE — PROGRESS NOTES
SURGICAL ICU PROGRESS NOTE  2023      PRIMARY TEAM: Transplant Surgery  PRIMARY PHYSICIAN: Dr. Clifton  REASON FOR ICU ADMISSION/CONSULT: Close hemodynamic monitoring, CRRT  ADMITTING PHYSICIAN: Dr. 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 diff, and HTN.  He is now s/p  donor liver and kidney transplant on 23 with Dr. Clifton. He was discharged from the SICU on  and readmitted on  due to the need for CRRT per Nephrology.    Interval Events   Patient remained on CRRT with equal I/O exchange yesterday (net +566 mL for the day). Midodrine 20 mg TID yesterday, with ongoing norepinephrine requirement 0-0.02. Abdominal MAE drain discontinued by Transplant. Patient passed SLP swallow evaluation, with recommendation of regular diet and thin liquids. Heparin drip was increased to 1400 units/h straight rate, and Xa remains subtherapeutic. Left basilic midline placed by IR. Overnight, right peripheral IV lost function and nurse removed it, which caused minor venous bleeding that was stopped with pressure this morning. Otherwise, no acute events overnight. Continues on peripheral 0.02 norepinephrine today.    TODAY'S PLAN (2023):  - Transfuse 1u pRBC   - Follow up post-transfusion Hgb  - Follow up TEG  - Schedule Robaxin q6h  - Continue midodrine 20 mg TID    - Wean IV pressor as able  - Decrease metoprolol tartrate to 12.5mg TID   - Albumin bolus once, with 2 mg Bumex x 2 today per  Transplant Nephrology   - Try to pull 0-100cc/hr (goal 25cc neg per hour) on CRRT today; stop if rising pressor needs   - Proceed with cosyntropin stimulation test today --> cosyntropin 60 min cortisol 23.7, normal  - Regular diet with supplements  - Consider discontinuing NJ tube   - Continue vancomycin (stop date is 6/20)  - Increased heparin drip to 1600u/hr   -  Monitor and titrate with Xa labs q6h. Goal Xa value 0.15-0.2     Neurological:  # Acute post-surgical pain   # Hx Alcohol use disorder, sober since 2016  - Monitor neurological status. Delirium preventions and precautions  - Sedation plan: None indicated  - Pain control: PRN oxycodone 2.5-5 mg q4h, Robaxin 500 q6h scheduled as seems to be effective for patient at present    # Aphasia, right sided gaze preference, inability to follow commands, resolved  # Encephalopathy  - Stroke code called ~0678 6/12  - Appreciate Neurology evaluation and assistance  - Head CT w/o contrast with no acute intracranial pathology   - MRA head/neck (w/o contrast) with no acute pathology related to presentation  - vEEG showing encephalopathy and negative for epileptiform activity   - Per discussion with patient's wife, symptoms are consistent with prior episodes of encephalopathy with increased ammonia levels (including gaze preference). Ammonia 18 6/12.   - Neurologic status improving. Alert and oriented x4 today 6/17. Continue with stimulation and activity. If no improvement with resolution of uremia, consider replacing tacrolimus with cyclosporine per transplant.    - BUN 41.0 (prior 37.4 <-- 43.2 <-- 73.3)     Pulmonary:   # Post operative ventilatory support, resolved  # Acute hypoxic respiratory failure requiring mechanical ventilation, resolved  # Bilateral small pleural effusions  - Successfully extubated 6/7 to NC. Maintaining oxygen saturations on room air.   - Aggressive pulmonary hygiene, IS, encourage OOB  - Supplemental O2 as needed to maintain SpO2 > 92%  -  Continue chest physiotherapy     Cardiovascular:    #Hx pAfib on PTA warfarin  # Afib with RVR, improving  #Hx CAD  #Hx HTN  #Hypotension, orthostatic  #Shock, likely septic- improving  - ECHO 6/5/23: afib, LVEF 55-60%  - MAP goal 60-85, SBP goal 110-160. 6/10 peripheral levophed started for persistent hypotension, off since 0100 6/12. Restarted 8 pm on 6/14. Wean as able.   - s/p LIJ CVC placement 6/15. CT chest showed LIJ CVC catheter in left internal jugular vein with indeterminate position of tip. Discontinued 6/16 due to suboptimal positioning after replacement with L basilic PICC by IR   - Increase midodrine to 20 mg TID    - PTA metoprolol succinate ER 25 daily. Metoprolol tartrate 25mg q8h on 6/13.   - 12.5 metoprolol tartrate TID  - Metoprolol 5 mg IV PRN for sustained tachycardia >120  - Continue to hold PTA warfarin  - Continue ASA 81 mg daily, PTA atorvastatin hold until outpatient  - Cardiology consulted; Signed off 6/11    -- TTE completed, EF 55-60%, normal ventricular function   -- Allow for permissive tachycardia, long term goal HR < 110, okay for spikes to 120-130s   -- Anticoagulation for Afib when safe from a surgical standpoint,     -- Heparin gtt post IR procedure per transplant; Start at 600 u/hr non-titratable per transplant attending. Increased to 1600 units/hour.      - Discussed previously with Transplant changing heparin gtt plan to standard ICU nursing protocol. Transplant would like to continue with non-titratable drip with q6h Xa labs to assess for needed dose adjustments.     Gastroenterology/Nutrition:  # Post-operative ileus, resolved  # At risk for malnutrition  # Hx of decompensated alcohol related + hereditary hemochromatosis (homozygous H63D) cirrhosis (MELD-Na 30) complicated by variceal bleeding s/p TIPS (10/1/2022) and hepatic encephalopathy now s/p DDLT 6/6  # Direct hyperbilirubinemia  - 6/7 repeat liver US: persistent low resistance waveforms and low resistive indices  throughout hepatic artery system concerning for ischemia, stable velocities and upstroke  - Daily hepatic panel:   - Bilirubin increasingly elevated today: TBili 1.8 (1.6), DBili 1.42 (1.29)   - Alk phos increasingly elevated today: 279 (261, 235)   - AST and ALT downtrending: AST 56 (62), ALT 95 (109)   - Continue to monitor hepatic labs daily  -  mg daily   - Ursodiol 250 mg BID   - Post-pyloric feeding tube placed by Radiology   - Nutrition consulted and following, appreciate input.    - Bowel regimen, Senna PRN   - PPI (Protonix)  - SLP evaluation . Recommendation of regular diet with thin liquids, supplements ordered.    Renal/ Fluids/Electrolytes:   #Lactic acidosis, resolved  # Hx ESRD on HD MWF (IgA nephropathy + ANCA vasculitis) now s/p DDKT 23  # Delayed graft function  - Expect some delayed graft function with  donor kidney, gradually increasing urine output over the past few days, responsive to diuretics  - transplant nephrology consulted and following, appreciate expertise  - Kidney ultrasound  with patent vessels. Renal ultrasound  without stenosis.    - CRRT discontinued 6/10, dialysis line removed due to positive blood cultures. Nephrology recommending CRRT be resumed () instead of HD due to tenous hemodynamics. IR placed line. Yesterday ran CRRT with Is equal Os.   - Albumin bolus once, with 2 mg Bumex x 2 today per Transplant Nephrology   - Try to pull 0-100cc/hr (goal 25cc neg per hour) on CRRT today; stop if rising pressor needs   - Urine output : 406 mL yesterday/ 95 mL since midnight  - Lux in place, continue for close UOP monitoring  - Cr 1.61 (1.59 prior), BUN 41.0, continuing CRRT    # hyperphosphatemia  - Phos 3.4 (3.3 prior)  # hypocalcemia  - Ionized Ca 4.3 (4.4 prior)  - Replete calcium  # hypermagnesemia    - Mg 2.0 (2.2 prior)  # Metabolic acidosis with decreased serum bicarbonate  - Worsening bicarb levels last several days. Bicarb 20 today  on BMP (18 prior).   - Sodium bicarb 650 mg TID started per Nephrology 6/14    - Albumin bolus once, with 2 mg Bumex x 2 today per Transplant Nephrology     Endocrine:  # Concern for adrenal insufficiency  Patient continues to have hemodynamic instability with hypotension and pressor need. He has a history of chronic use of prednisone 10 mg.  - Proceed with cosyntropin stimulation test today --> cosyntropin 60 min cortisol 23.7, normal      # Stress hyperglycemia, improving  -Currently on high correction ISS. Goal to keep BG< 180 for optimal wound healing      ID:  # Stress leukocytosis, improving  # Post-transplant ppx per transplant  - WBC 14.8 from 11.6. Afebrile.   -Perioperative antibiotics: Zosyn x 48 hrs (completed 6/8), micafungin x 14 days  -Immunosuppression per transplant: no further doses of thymoglobulin, 1.5 mg BID oral tacrolimus, 10 mg oral prednisone taper to 5 mg for chronic use,  mg BID  -Prophylactic regimen: Valganciclovir, Bactrim Ppx     # Enterococcus bacteremia  # Septic shock, resolved  - 6/9 blood cultures x2 growing E. Faecium. 6/10, 6/11, 6/12 blood cx NGTD.   - Transplant ID consulted and following, appreciate recs  - Abx: linezolid and Zosyn stopped (610-6/12). Vancomycin started 6/12. Continue through 20th per ID recommendations.   - Daily blood cultures until clear. Stopped daily blood cultures 6/13    Heme:     # Acute blood loss anemia   # Anemia of critical illness and chronic renal disease  # Hx hereditary hemochromatosis   # Thrombocytopenia 2/2 liver dysfunction  - Transfusion goals: INR <2, Platelets > 20, Fibrinogen > 200, Hgb >8.0  - Hgb 7.2 after bleeding from peripheral IV, prior this morning 7.3 (7.7 yesterday), Plt 153, INR 1.14    - Transfuse 1u pRBC    - Follow up post-transfusion Hgb   - Follow up TEG  - Restarted heparin drip 6/14, titrating based on Xa levels (goal Xa is 0.15-0.2), increased to 1600 U/hr and heparin Xa <0.10 subtherapeutic   - Recheck Xa  level q6h   - Monitor and titrate with Xa labs q6h. Goal Xa value 0.15-0.2  Recommended to Transplant using SICU nursing protocols for heparin drip.  Transplant would like to keep their own titration plan, as they are primary we will continue current drip with q6h labs.   - Upper and Lower extremity DVT US 6/15 showed right internal jugular thrombus and no DVTs    Rheumatologic:  #Hx Gout  - Hold PTA prednisone 10mg daily  - Prednisone 10 mg daily. Transitioning methylprednisolone 8 mg taper back to prednisone 10 mg 6/13 per transplant. Taper to 5 mg for chronic use.     Musculoskeletal:  # Weakness and deconditioning of critical illness   - Physical and occupational therapy consult, appreciate recommendations. Encourage increased time OOB when mental status appropriate.     Skin:  -Diligent skin care to prevent injury    General Cares/Prophylaxis:    DVT Prophylaxis: Heparin gtt, SCDs  GI Prophylaxis: Protonix BID  Restraints: Restraints for medical healing needed: No    Lines/ tubes/ drains:  - PIV x1  - (Transplant discontinued intraabdominal MAE drain x1 on 6/16)   - Axillary arterial line  - CVC R external jugular (dialysis line)   - PICC line, left basilic (6/16)  - NJ tube  - Lux catheter (exchanged 6/15)       Disposition:  - Surgical ICU    Discussed with Surgical Critical Care Attending, Dr. Truong Nowak M.D.    Cindy Hussein M.D.  General Surgery Resident PGY1  [facility]      - - - - - - - - - - - - - - - - - - - - - - - - - - - - - - - - - - - - - - - - - - - - - -   SUBJECTIVE:   See above for interval events.    Patient reports doing okay today, but his feet and ankles hurt and feel more swollen than yesterday. Most painful at joints near right great toe. Denies lightheadedness, chest pain, shortness of breath on 2 LPM NC, nausea/vomiting, abdominal pain. Low urine output overnight. Passing flatus.    PHYSICAL EXAMINATION:  Temp:  [97.4  F (36.3  C)-98.8  F (37.1  C)] 97.6   F (36.4  C)  Pulse:  [] 87  Resp:  [12-22] 12  MAP:  [57 mmHg-96 mmHg] 82 mmHg  Arterial Line BP: ()/(17-81) 109/64  SpO2:  [92 %-100 %] 93 %     General: Adult male supine in bed, alert and interactive, NAD.    HEENT: NG and NJ secure in place. PERRL, EOMI. Mild scleral icterus.  Pulm/Resp: Breathing unlabored on 2L NC, equal chest rise, no audible wheezing. Lung sounds mildly diminished bilaterally. Coughing.  CV: Irregularly irregular rhythm on monitor, normal rate.  Abdomen: Soft, mildly distended, chevron incision without drainage, former MAE drain site with dressing C/D/I. Soft nontender reducible umbilical hernia.  : Lux catheter in place with minimal yellow urine output.   MSK/Extremities: Significant pitting peripheral edema on feet and ankles, no edema above SCDs, peripheral pulses intact. Right arm bandaged at former PIV site.  Skin: Scattered purpura throughout face, chest, abdomen, and arms.  Neuro: Alert and oriented to self, place, date, and reason for hospitalization. Answers questions. Follows commands. Wiggles toes.    LABS: Reviewed.   Arterial Blood Gases   Recent Labs   Lab 06/15/23  1723 06/12/23  0535 06/11/23  1833 06/11/23  1244   PH 7.44 7.34* 7.34* 7.34*   PCO2 36 38 39 39   PO2 112* 176* 128* 124*   HCO3 24 20* 21 21     Complete Blood Count   Recent Labs   Lab 06/17/23  0410 06/16/23 2018 06/16/23  1158 06/16/23  0424   WBC 13.2* 14.7* 14.8* 13.2*  13.2*   HGB 7.3* 7.6* 7.7* 8.1*  8.1*    144* 130* 133*  133*     Basic Metabolic Panel  Recent Labs   Lab 06/17/23  0412 06/17/23  0014 06/16/23 2018 06/16/23 2016 06/16/23  1202 06/16/23  1158 06/16/23  0428 06/16/23  0424 06/15/23  2123 06/15/23  2120   NA  --   --  140  --   --  138  --  136  --  135*   POTASSIUM  --   --  3.7  --   --  3.7  --  3.7  --  3.7   CHLORIDE  --   --  107  --   --  106  --  104  --  104   CO2  --   --  20*  --   --  19*  --  20*  --  19*   BUN  --   --  41.0*  --   --  38.0*  --   37.4*  --  43.2*   CR  --   --  1.61*  --   --  1.47*  --  1.59*  --  1.81*   * 169* 188* 180*   < > 222*   < > 173*   < > 168*    < > = values in this interval not displayed.     Liver Function Tests  Recent Labs   Lab 06/17/23 0410 06/16/23 2018 06/16/23  1158 06/16/23  0424 06/15/23  2120 06/15/23  1149 06/15/23  0417 06/14/23  2118 06/14/23  0357   AST 56*  --   --  62*  --   --  72*  --  66*   ALT 95*  --   --  109*  --   --  130*  --  148*   ALKPHOS 279*  --   --  261*  --   --  235*  --  206*   BILITOTAL 1.8*  --   --  1.6*  --   --  2.2*  --  2.1*   ALBUMIN  --  2.1* 2.1* 2.1* 2.0*   < > 2.2*   < > 2.3*   INR 1.14  --   --  1.10  --   --  1.03  --  1.06    < > = values in this interval not displayed.     Pancreatic Enzymes  No lab results found in last 7 days.  Coagulation Profile  Recent Labs   Lab 06/17/23  0410 06/16/23  0424 06/15/23  0842 06/15/23  0417 06/14/23  2118 06/14/23  0357   INR 1.14 1.10  --  1.03  --  1.06   PTT  --   --  29 30 30 26       IMAGING:  Recent Results (from the past 24 hour(s))   CT Chest w/o Contrast    Narrative    CT chest without contrast    INDICATION: Post left IJ central venous catheter placement. Confirm  placement location before removal. Assess left innominate versus  aortic arch.    COMPARISON: Plain film yesterday    FINDINGS: No contrast. Included fat appears unremarkable. Feeding tube  terminates in the distalmost stomach. Right perinephric stranding.  Fluid density along the inferior posterior right hepatic lobe border  may indicate other postprocedural change an possible related small  noninfected fluid collection. Benign gynecomastia bilaterally. Small  bilateral pleural effusions. Bibasilar atelectasis. Heart upper normal  size. Aortic annular and multiple coronary artery calcifications.  Aorta and pulmonary artery calibers are normal.  Pectoralis muscle atrophy bilaterally especially on the left. Other  shoulder muscle atrophy bilaterally.  Bone  detail shows diffuse hepatic skeletal hyperostosis with other  lower cervical degenerative disc disease.  Right IJ catheter tip in the right innominate vein.  Left IJ catheter tip it is not arterial. The tip appears to project at  or just through the vascular wall of the left innominate vein ordering  to a very small feeding venous vessel. Consider fluoroscopic guided  contrast injection before manipulation of the catheter to confirm tip  position. Detail of the lungs shows other minimal edema and  atelectasis in the lungs likely associated with the pleural effusions.  Patchy nodular densities in the left upper lobe anterolaterally  indication other groundglass opacities or foci of  edema/atelectasis/infection.  Airway debris in the trachea distally.  Drainage catheter in right side of abdomen also crossing the midline  anterior to the stomach, this catheter is also incompletely imaged at  its left sided aspect. Embolization material in region of gastric  fundus/distal splenic vasculature.      Impression    IMPRESSION: Indeterminate position of tip above left IJ catheter  either in or against the wall of the innominate vein, within a very  small feeding venous vessel or extravascular in the mediastinal soft  tissues. Consider fluoroscopic guided contrast injection before  catheter manipulation. This does not appear arterial.  Small pleural effusions with associated atelectasis bilaterally.  Subtle left upper lobe patchy subpleural densities may indicate other  foci of edema, atelectasis or infection.  Cardiomegaly. Aortic and coronary vascular calcifications.  Feeding tube tip in gastric pylorus.  Debris in the distal trachea.  Right IJ catheter tip in right innominate vein.  Drainage catheter in the upper abdomen. Fluid adjacent to the  posterior inferior right hepatic border. This area is incompletely  imaged on this chest CT.    KEV CHEN MD         SYSTEM ID:  [id number]   IR PICC Placement > 5 Yrs of  Age    Narrative    Procedure 6/16/23:  1. Left upper extremity PICC line placement    History: PICC line requested for central access    Comparison: CT 6/16/23    Staff: Dr. Sweeney    Fellow: Dr. Chang    Monitoring/Medications: Patient received local anesthesia only and was  monitored by the nursing staff under the supervision of the IR  attending. Vital signs remained stable throughout the procedure.    1% lidocaine used for local analgesia.    Fluoro time: 2.9 minutes     Findings/procedure: The left upper extremity was prepped draped in the  usual sterile fashion.  Ultrasound revealed a patent basilic vein, and  the overlying tissues were anesthetized with 5 ml of 1% lidocaine.   Under ultrasound guidance a micropuncture needle was advanced into the  basilic vein. The needle was removed and a 0.018 Nitinol wire was  advanced to the subclavian vein as the wire would not pass centrally,  and a measurement was obtained. The catheter was removed and a 5  Nigerien peel-away sheath and dilator were advanced.  The wire and  dilator was removed and a 5 Nigerien, 37 cm long catheter was advanced  over the wire, with its tip lying in the left subclavian vein. The  catheter was secured with stat lock. The catheter aspirated and  flushed freely and was flushed with 2 ml heparin 100 units per ml..   The site was cleansed and dressed.  The procedure was well tolerated.      Impression    Impression: Ultrasound-guided placement of 5 Nigerien, 37 cm, dual lumen   PICC left as midline catheter in left subclavian vein.     Plan: Patient discharged to patient care unit in stable condition.       XR Chest Port 1 View    Narrative    EXAM: XR CHEST PORT 1 VIEW  6/16/2023 7:15 PM     HISTORY:  check for pneumothorax after IJ removal       COMPARISON:  6/16/2023, 6/15/2023    FINDINGS:   AP portable semiupright view of the chest. Right IJ sheath in similar  position. Interval removal of left IJ central venous catheter. Enteric  tube seen  coursing below diaphragm and out of the field-of-view.  Trachea is midline. Persistent widened cardiomediastinal silhouette.  Stable perihilar and bibasilar streaky pulmonary opacities. Small  bilateral pleural effusion. No appreciable pneumothorax.    No acute osseous abnormality. Embolization coils within the left upper  quadrant.       Impression    IMPRESSION:   1. Interval removal of left IJ central venous catheter without  appreciable pneumothorax.  2. Stable small bilateral pleural effusion and perihilar and bibasilar  pulmonary opacities, favoring atelectasis/edema    I have personally reviewed the examination and initial interpretation  and I agree with the findings.    ADRIANA BARROS MD         SYSTEM ID:  [id number]   XR Chest Port 1 View    Narrative    EXAM: XR CHEST PORT 1 VIEW  6/16/2023 8:43 PM     HISTORY:  CVC removed; Confirm no pneumo or hemothorax       COMPARISON:  6/16/2023    FINDINGS:   AP portable semiupright view of the chest. Right IJ sheath in similar  position. Interval removal of left IJ central venous catheter. Enteric  tube seen coursing below diaphragm and out of the field-of-view.  Endoscopy clips. Trachea is midline. Mildly increased bibasilar  streaky pulmonary opacities. Persistently widened cardiomediastinal  silhouette. Small bilateral pleural effusion. No appreciable  pneumothorax.    No acute osseous abnormality. Embolization coils and surgical staples  within the left upper quadrant.       Impression    IMPRESSION:   1. Interval removal of left IJ central venous catheter without  appreciable pneumothorax.  2. Small bilateral pleural effusions with increased bibasilar  opacities, favoring atelectasis/edema.  3. Stable widened cardiomediastinal silhouette, dating back to  6/8/2023.    I have personally reviewed the examination and initial interpretation  and I agree with the findings.    ADRIANA BARROS MD         SYSTEM ID:  [id number]

## 2023-06-17 NOTE — PROGRESS NOTES
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 RTOR on  with concern for low flow in the renal graft - ex-lap, washout, closure with healthy appearing graft with intact arterial and venous flow.     s/p DBD simultaneous liver kidney transplant on 2023 with complex reconstruction of accessory right hepatic artery. POD #11  * RTOR on  with concern for low flow in the renal graft - ex-lap, washout, closure with healthy appearing graft with intact arterial and venous flow.      Alkaline phosphatase 279 (261), and total bilirubin 1.8 (1.6). AST 56 (62), ALT 95 (109)   -Liver US w/doppler 2023 - low waveforms and low resistive indices, but stable velocities and upstrokes.   Stent: No biliary stent in place. Ursodiol 250mg BID.  Drains: 1 MAE drain in place with serosanguinous output; oliguric at 0.11 mL/kg/hr  - ASA 325mg daily      H/o CKD related IgA nephropathy and ANCA vasculitis s/p  donor renal transplant on 2023 with ureteral/J stent placement, delayed graft function. Nephrology consulted + following. Latest renal US with doppler  with faster than expected iliac vein velocity above the anastomosis may represent edema and elevated superior arcuate artery resistive index.  UOP in the last 24 hours: 326 mL (from 880 mL on ). CRRT 2055 mL. Serum creatinine and BUN improving.   - s/p temporary dialysis line placed ; pending re-positioning  - CRRT re-started  given worsening renal dysfunction, uremia and encephalopathy. Nephrology recommends continuing CRRT until improved blood pressure. Will remove 100 mL with CRRT today, as tolerated, with pressor management per  SICU team.  - Nephrology suggest considering kidney biopsy if no improvement in function over the next few days.   - Will be on acute GN protocol post transplant.    Metabolic acidosis: CO2 20 today. Sodium bicarbonate 650mg TID.   Hyperphosphatemia: Renal replacement as above     Immunosuppressed status secondary to medications:   - Thymoglobulin: s/p 1mg/kg on 6/6, 6/7, 6/8, 6/9. Hold starting 6/10 due to concern for bacteremia and possible sepsis. No further doses of thymoglobulin.   - Steroid taper per protocol, completed, now resumed PTA prednisone 10 mg for rheumatoid arthritis (see below). Will plan to taper down to 5 mg Prednisone for chronic use.   - Tacrolimus 1.5 mg BID, goal 5-8. Tac level at 8.1 on 6/16/23.  - MMF 750mg BID    Hematology:   Acute on chronic anemia: Hgb at 7.2 from 8.1 on 6/16/23. Has been 6.6-9.3 post-operatively; last pRBC transition on 6/12/2023 (1u pRBC)   Thrombocytopenia: Likely related to chronic liver disease, improving, 163 this AM.   Leukocytosis: Afebrile, but slightly worsening leukocytosis. CT chest 6/16 with bibasilar atelectasis + small bilateral pleural effusion. Patchy densities in the left upper lobe anterolaterally. Intermittently requiring 2L NC. No other localizing s/sx of infection. Urine can blood cultures have shown no growth.     Cardiac:  Coronary artery disease: Continue  mg daily. Atorvastatin 20 mg daily (re-started 6/15/2023)     Paroxysmal atrial fibrillation:  Cardiology consulted: recommended low dose beta blockade.              * Metoprolol 25 mg q8h + metoprolol 5mg IV PRN for sustained tachycardia > 120               * Heparin gtt with provider managed Xa goal 0.15-0.2; last measured anti Xa level of < 0.10 on 6/17/23.     Hypotension: Baseline hypotension with concern for sepsis with new finding of bacteremia (last positive blood culture was 6/9/23) . 6/10 Echo: EF 55-60%.              * Midodrine 10mg TID               * Vasopressors  (6/6-6/12, 6/15 - present)     - On Levophed gtt this AM: 0.02 mcg/kg/min   * Nephrology recommends considering albumin challenge with bumetanide 2 mg BID     GI/Nutrition:   Hepatitis B s Ag +: Noted to be positive pre-transplant on 6/5/23, but not previously positive (1/26/2023). HBV DNA negative on 6/5/23. No clear at risk behavior. Repeat Hep B s Ag was negative and HBV DNA not detected; suspect 6/5/2023 HBsAg positivity was a false positive.      Mild protein calorie malnutrition: Nasal-enteric feeding tube in place  - TF at 40cc/hr after line adjustment. Nutrition consulted  - Regular adult diet     Post-operative ileus, RESOLVED: Distended stomach and bowel loops noted on 6/10 AXR. NG placed and now removed. Now good stool output which suggests against ileus.      Bowel regimen:               * Given loose stools, plan for fiber supplementation in the setting of tube feeds              * Senna 1 tablet PRN + miralax PRN    Endocrine:   Post-operative elevated blood glucoses: HgA1c 6.1% (6/6/2023)              * Sliding scale insulin prn     Rheumatology:  Psoriatic arthritis: Prior to admission was on prednisone 10mg daily, on prednisone taper as above.               *Goal to discharge on prednisone 5mg; may consider cortisol stimulation test given long term steroid use     : Lux in place for strict I&Os    Neurologic + MSK  Encephalopathy: Altered mental status was first noted 6/11/2023. Neurology consulted. Head CT without evidence of acute intra-cranial pathology. MRI/MRA - small area of diffusion restriction on DWI without correlation on ADC. EEG consistent with severe encephalopathy, but no seizures. Etiology of encephalopathy likely multifactorial: worsening uremia and recent bacteremia (although clearance of bacteremia has not resulted to improvement in mental status). No sedating medications; no recent opioids  Encephalopathy has improved and patient's mental status is back to baseline in the  setting of improving uremia; BUN at 43 today.  - Delirium precautions     Acute post operative pain:               * Methocarbamol 500mg q4h PRN              * Oxycodone 2.5-5mgm q4h PRN     H/o gout: Prednisone as above     Deconditioning/Weakness: OT and PT optimization     Alcohol use disorder: Continue sober supports post transplant. Continue complete sobriety.    Infectious disease  Enterococcus Faecium bacteremia: 6/9 blood cultures positive for enterococcus faecium. No vegetations noted on TTE. Urine culture 6/10/23 without any growth. ID consulted.   * Blood cultures from 6/10, 6/11, 6/12, 6/13, and 6/16 with no growth to date  * Antibiotics: Vancomycin 6/12-6/20; s/p linezolid 6/10-6/12. S/p empiric zosyn 6/10-6/12     Prophylaxis: Mechanical DVT ppx (heparin as above), fall, GI (PPI BID), fungal (micafungin x 14 days), CMV (valganciclovir, CMV IgG Ab +), PJP (bactrim)     Disposition: Ongoing SICU status given plan for CRRT (vs iHD) given hypotension    GEORGE/Fellow/Resident Provider: Tito George MS4 and Lizbeth Greer NP     Faculty: Dr. Charleen Garcia MD  _________________________________________________________________    Interval History:   Overnight events: No acute events overnight, he described some dizziness upon attempting to walk this morning.    ROS:   A 10-point review of systems was negative except as noted above.    Meds:    albumin human  12.5 g Intravenous Once     albumin human  25 g Intravenous Once     aspirin  325 mg Oral Daily     atorvastatin  20 mg Oral QPM     bumetanide  2 mg Intravenous BID     [Held by provider] calcium acetate (phos binder)  667 mg Oral or Feeding Tube Q6H     cosyntropin  250 mcg Intravenous Once     fiber modular  1 packet Per Feeding Tube TID     heparin  3 mL Intracatheter Q24H     insulin aspart  1-6 Units Subcutaneous Q4H     methocarbamol  500 mg Oral Q6H     metoprolol tartrate  12.5 mg Oral Q8H KARAN     micafungin  100 mg Intravenous Q24H     midodrine  " 20 mg Oral or Feeding Tube Q8H     multivitamin RENAL  1 tablet Oral Daily     mycophenolate  750 mg Oral BID IS     pantoprazole  40 mg Per Feeding Tube QAM AC     predniSONE  10 mg Per Feeding Tube Daily     protein modular  1 packet Per Feeding Tube TID     sodium bicarbonate  650 mg Oral or Feeding Tube TID     sodium chloride (PF)  10-40 mL Intracatheter Q7 Days     sodium chloride (PF)  3 mL Intravenous Q8H     sulfamethoxazole-trimethoprim  1 tablet Oral or Feeding Tube Once per day on Mon Wed Fri     [Held by provider] tacrolimus  1.5 mg ORAL OR NJ TUBE BID IS     ursodiol  250 mg Oral BID     valGANciclovir  450 mg Oral Daily    Or     valGANciclovir  450 mg Oral or NG Tube Daily     vancomycin  1,500 mg Intravenous Q24H       Physical Exam:     Admit Weight: 102.3 kg (225 lb 8 oz)    Current vitals:   BP (!) 83/69   Pulse 87   Temp 97.4  F (36.3  C) (Axillary)   Resp 14   Ht 1.702 m (5' 7\")   Wt 107.6 kg (237 lb 3.4 oz)   SpO2 99%   BMI 37.15 kg/m      Vital sign ranges:    Temp:  [97.4  F (36.3  C)-98.8  F (37.1  C)] 97.4  F (36.3  C)  Pulse:  [] 87  Resp:  [12-22] 14  MAP:  [57 mmHg-96 mmHg] 70 mmHg  Arterial Line BP: ()/(38-81) 96/48  SpO2:  [92 %-99 %] 99 %    General Appearance: sitting upright in a chair, conversational  Skin: Warm, perfused  Heart: irregular rhythm, distant heart sounds.  Lungs: coarse lung sounds bilaterally  Abdomen: The abdomen is obese, non-distended, diffuse incisional sensitivity; incision is clean dry and intact.  : stauffer is present.  Urine is dark brown  Extremities: edema, able to move all four extremities independently  Neurologic: A&Ox4. Tremor is absent.    Data:   CMP  Recent Labs   Lab 06/17/23  0820 06/17/23  0412 06/17/23  0410 06/17/23  0014 06/16/23  2018 06/16/23  1202 06/16/23  1158 06/16/23  0428 06/16/23  0424   NA  --   --   --   --  140  --  138  --  136   POTASSIUM  --   --   --   --  3.7  --  3.7  --  3.7   CHLORIDE  --   --   --   --  " 107  --  106  --  104   CO2  --   --   --   --  20*  --  19*  --  20*   * 183*  --    < > 188*   < > 222*   < > 173*   BUN  --   --   --   --  41.0*  --  38.0*  --  37.4*   CR  --   --   --   --  1.61*  --  1.47*  --  1.59*   GFRESTIMATED  --   --   --   --  47*  --  53*  --  48*   NAZARIO  --   --   --   --  7.8*  --  7.7*  --  7.8*   ICAW  --   --  4.4  --  4.3*  --  4.5  --  4.2*   MAG  --   --  2.1  --  2.1  --  2.1  --  2.2   PHOS  --   --   --   --  3.6  --  3.2  --  3.3   ALBUMIN  --   --   --   --  2.1*  --  2.1*  --  2.1*   BILITOTAL  --   --  1.8*  --   --   --   --   --  1.6*   ALKPHOS  --   --  279*  --   --   --   --   --  261*   AST  --   --  56*  --   --   --   --   --  62*   ALT  --   --  95*  --   --   --   --   --  109*    < > = values in this interval not displayed.     CBC  Recent Labs   Lab 06/17/23  0854 06/17/23  0410   HGB 7.2* 7.3*  7.3*   WBC 14.2* 13.2*  13.2*    153  153

## 2023-06-17 NOTE — PLAN OF CARE
"Requested Prescriptions   Pending Prescriptions Disp Refills     valsartan (DIOVAN) 80 MG tablet 90 tablet 3     Sig: Take 2 tablets (160 mg) by mouth daily       Angiotensin-II Receptors Failed - 3/20/2020  9:45 AM        Failed - Last blood pressure under 140/90 in past 12 months     BP Readings from Last 3 Encounters:   02/03/20 (!) 160/102   09/09/19 (!) 141/92   02/13/19 137/80                 Failed - Normal serum creatinine on file in past 12 months     Recent Labs   Lab Test 09/13/18  1007   CR 0.90       Ok to refill medication if creatinine is low          Failed - Normal serum potassium on file in past 12 months     Recent Labs   Lab Test 09/13/18  1007   POTASSIUM 4.1                    Passed - Recent (12 mo) or future (30 days) visit within the authorizing provider's specialty     Patient has had an office visit with the authorizing provider or a provider within the authorizing providers department within the previous 12 mos or has a future within next 30 days. See \"Patient Info\" tab in inbasket, or \"Choose Columns\" in Meds & Orders section of the refill encounter.              Passed - Medication is active on med list        Passed - Patient is age 18 or older           Last Written Prescription Date:  2/3/2020 wrong pharmacy  Last Fill Quantity: 90,  # refills: 3   Last office visit: 2/3/2020 with prescribing provider:     Future Office Visit:   Next 5 appointments (look out 90 days)    Apr 14, 2020  5:20 PM CDT  [facility]art Allergy In[facility] with [facility]  [facility] [address] [facility] [place] [address]  [phone]   Apr 14, 2020  5:20 PM CDT  [facility] Visit with Rose[facility]ada MD  [facility] ([facility]) [address] [facility] [place] [address]  [phone]         Routing refill request to provider for review/approval because:  Failed protocols        Tricia Spears RN  [facility] " Major Shift Events:   Neuro: A&Ox4, forgetful, moderate/weak strength in all ext, CMS at baseline  Cardiac: a fib, HR < 100 primarily, pulses palpable but weak, afebrile  Resp: lung sounds coarse, sating > 92% 2L (attempted to wean, NIKOS like pattern), IS at bedside   GI: gas discomfort, bowel sounds faint, LBM this shift, loose stool   : stauffer intact with minimal, cloudy pavel urine, CRRT I=O per flowsheets   Skin: scattered skin tears, bruising, & petechiae; clamshell incision EDUARDO, kidney incision leaking serous drg, umbilical bulge present   Pain/Nausea: generalized joint/back pain treated with robaxin; denies nausea  LDA: R PIV infusing heparin gtt; L midline (2) infusing peripheral levo & abx; R HD CVC; NJT with TF at goal 40ml/hr & St FWF, R brachial art line  Diet: regular  Mobility: 2 assist lift  Labs: reviewed, Ca gluconate given, BMP pending  Plan: continue POC   For vital signs and complete assessments, please see documentation flowsheets.       Clinic

## 2023-06-17 NOTE — PROGRESS NOTES
Shift summary 0700 to 1930 today  Neuro:A&O x4, Robaxin now scheduled for muscular pain, good pain control. No narcotic given this shift. Tolerated up in chair x5 hrs today.  CV: HR below 100. All IV access patent. Pulses per baseline.Remains edematous. Norepi gtt weaned to off @ 1417 today.  One unit PRBC for HGB 7.2.  Pulm: Strong cough, lungs still coarse/dim.  Unable to wean from 2 liters O2. Per nasal cannula.   GI: Stopped tube feeds per SICU orders. Excellent PO intake. Sliding scale insulin increased to high scale. BMx3 this shift, still incontinent.   : CRRT goals increased to net negative 100cc/hr.tolerting well. Bumex/albumin doses given concurrently, pt did have increased urine output to 30cc/hr up from baseline of 10cc/hr.   Continue plan of care.

## 2023-06-17 NOTE — PLAN OF CARE
Speech Language Therapy Discharge Summary    Reason for therapy discharge:    All goals and outcomes met, no further needs identified.    Progress towards therapy goal(s). See goals on Care Plan in [facility] electronic health record for goal details.  Goals met    Therapy recommendation(s):    No further therapy is recommended. Recommendregular diet/thin liquids. Ensure pt is upright and eating/drinking at slow rate.

## 2023-06-17 NOTE — PROGRESS NOTES
[facility]   Transplant Nephrology Progress Note  Date of Admission:  6/5/2023  Today's Date: 06/17/2023    Recommendations:  - Will continue on CRRT until improved blood pressure or better kidney function.  - Consider stress dose steroids given chronic steroid use prednisone 10 and current hemodynamic instability, pressor need  - Continue midodrine.  - Consider albumin challenge with bumetanide 2 mg bid.  - Okay to pull some volume with CRRT, as tolerated, with pressor management per SICU team.  - Would make no changes to immunosuppression.  - Would consider kidney transplant biopsy in the next few days if no improvement in function.    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  Still oliguric.  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  Slow trend down in LFTs.  Followed by Transplant Surgery.    # Immunosuppression: Tacrolimus immediate release (goal 5-8), Mycophenolate mofetil (dose 750 mg every 12 hours) and Prednisone (dose 10 mg daily)    - Induction with Recent Transplant:  rATG total 4mg/kg, stopped due to sepsis   - Changes: Not at this time    # Infection Prophylaxis:   - PJP: Sulfa/TMP (Bactrim)  - CMV: Valganciclovir (Valcyte);CMV IgG Ab positive  - Thrush: Micafungin (Mycamine)  - Fungal: Micafungin (Mycamine)    # Hypotension: Hypotensive, on pressors 6/14 Goal BP: MAP > 65   -  Volume status: Mildly hypervolemia  EDW ~ 96.4 kg (on HD)   - Changes: Yes - Would consider stress dose steroids.  Would also recommend albumin challenge and bumetanide 2 mg bid.    - Okay to try and remove some volume with CRRT, as tolerated, and defer pressor requirements to SICU team.    # Elevated Blood Glucose: Glucose generally running ~ 140-180s'   - On insulin prn.   - Management as per primary team.    # Anemia in Chronic Renal Disease/Surgery: Hgb: Stable, low      FEDERICO: No   - Iron studies: Replete    - Transfusion for Hgb <7 mg/dl, management per primary team    # Mineral Bone Disorder:   - Secondary renal hyperparathyroidism; PTH level: Minimally elevated ( pg/ml)        On treatment: None  - Vitamin D; level: Normal        On supplement: No  - Calcium; level: Normal       On supplement: No  - Phosphorus; level: Normal     On binder: No    # Electrolytes:   - Potassium; level: Normal        On supplement: No  - Magnesium; level: Normal        On supplement: No  - Bicarbonate; level: Stable low        On supplement: Yes  - Sodium; level: Normal    # Paroxysmal A-Fib: Stable heart rate on beta blocker and anticoagulation with IV heparin.    # History of C.diff: Continues with loose stools, but negative C. diff with last check 6/11.    # E. Faecium Bacteremia: Diagnosed on 6/9 BlCx. Treated with vancomycin.  Did have a line holiday.    # ANCA Vasculitis: S/p Rituximab x2   - Will be on acute GN protocol post transplant.    # Gout: On prednisone 10 mg PO daily PTA. Currently on prednisone 10 with plan to eventually wean to prednisone 5 once hemoydynamically stable, other immunosuppression dose/trough at goal.    # Transplant History:  Etiology of Kidney Failure: IgA nephropathy and ANCA vasculitis  Tx: Liver Tx (SLK)  Transplant: 6/6/2023 (Liver), 6/6/2023 (Kidney)  Significant changes in immunosuppression: None  Significant transplant-related complications: None    Recommendations were communicated to  the primary team verbally     Jeovany Scott MD   Pager: [phone]    Interval History   Mr. Josephs creatinine is 1.61 (06/16 2018); Stable on CRRT.  Minimal urine output and remains oliguric.  Other significant labs/tests/vitals: Electrolytes have been stable with today's pending.  Stable hemoglobin.  No new events overnight.  No chest pain or shortness of breath.  Some leg swelling, but stable.  Occasional nausea, but no vomiting.  Bowel movements are loose.  No fever, sweats or chills.    Review of Systems   4 point ROS was obtained and negative except as noted in the Interval History.    MEDICATIONS:    aspirin  325 mg Oral Daily     atorvastatin  20 mg Oral QPM     [Held by provider] calcium acetate (phos binder)  667 mg Oral or Feeding Tube Q6H     fiber modular  1 packet Per Feeding Tube TID     heparin  3 mL Intracatheter Q24H     insulin aspart  1-6 Units Subcutaneous Q4H     metoprolol tartrate  25 mg Oral Q8H KARAN     micafungin  100 mg Intravenous Q24H     midodrine  20 mg Oral or Feeding Tube Q8H     multivitamin RENAL  1 tablet Oral Daily     mycophenolate  750 mg Oral BID IS     pantoprazole  40 mg Per Feeding Tube QAM AC     predniSONE  10 mg Per Feeding Tube Daily     protein modular  1 packet Per Feeding Tube TID     sodium bicarbonate  650 mg Oral or Feeding Tube TID     sodium chloride (PF)  10-40 mL Intracatheter Q7 Days     sodium chloride (PF)  3 mL Intravenous Q8H     sulfamethoxazole-trimethoprim  1 tablet Oral or Feeding Tube Once per day on Mon Wed Fri     [Held by provider] tacrolimus  1.5 mg ORAL OR NJ TUBE BID IS     ursodiol  250 mg Oral BID     valGANciclovir  450 mg Oral Daily    Or     valGANciclovir  450 mg Oral or NG Tube Daily     vancomycin  1,500 mg Intravenous Q24H       dextrose       CRRT replacement solution 12.5 mL/kg/hr (06/17/23 0812)     heparin 1,400 Units/hr (06/17/23 0900)     - MEDICATION INSTRUCTIONS -       norepinephrine 0.02 mcg/kg/min (06/17/23 0900)      "CRRT replacement solution 200 mL/hr at 06/15/23 1659     CRRT replacement solution 12.5 mL/kg/hr (23 0814)       Physical Exam   Temp  Av.9  F (36.6  C)  Min: 97  F (36.1  C)  Max: 99.2  F (37.3  C)  Arterial Line BP  Min: 60/45  Max: 131/80  Arterial Line MAP (mmHg)  Av mmHg  Min: 52 mmHg  Max: 101 mmHg      Pulse  Av.5  Min: 68  Max: 159 Resp  Av.8  Min: 14  Max: 23  FiO2 (%)  Av.3 %  Min: 40 %  Max: 50 %  SpO2  Av.1 %  Min: 92 %  Max: 100 %    CVP (mmHg): 6 mmHgBP (!) 83/69   Pulse 87   Temp 97.6  F (36.4  C) (Axillary)   Resp 12   Ht 1.702 m (5' 7\")   Wt 107.6 kg (237 lb 3.4 oz)   SpO2 99%   BMI 37.15 kg/m      Admit Weight: 102.3 kg (225 lb 8 oz)     GENERAL APPEARANCE: alert and no distress  HENT: mouth without ulcers or lesions  RESP: lungs clear to auscultation - no rales, rhonchi or wheezes  CV: regular rhythm, normal rate, no rub, no murmur  EDEMA: trace to 1+ LE edema bilaterally  ABDOMEN: soft, nondistended, mildly tender, bowel sounds normal  MS: extremities normal - no gross deformities noted, no evidence of inflammation in joints, no muscle tenderness  SKIN: no rash  TX KIDNEY: mild TTP  DIALYSIS ACCESS:  Temporary catheter right internal jugular    Data   All labs reviewed by me.  CMP  Recent Labs   Lab 23  0820 23  0412 23  0410 23  0014 23  2018 23  1202 23  1158 23  0428 23  0424 06/15/23  2123 06/15/23  2120 06/15/23  0418 06/15/23  0417 23  0404 23  0357 23  0737 23  0351   NA  --   --   --   --  140  --  138  --  136  --  135*   < > 137   < > 135*   < > 137   POTASSIUM  --   --   --   --  3.7  --  3.7  --  3.7  --  3.7   < > 3.7   < > 4.6   < > 5.1   CHLORIDE  --   --   --   --  107  --  106  --  104  --  104   < > 103   < > 98   < > 100   CO2  --   --   --   --  20*  --  19*  --  20*  --  19*   < > 18*   < > 16*   < > 17*   ANIONGAP  --   --   --   --  13  --  13  --  12  " --  12   < > 16*   < > 21*   < > 20*   * 183*  --  169* 188*   < > 222*   < > 173*   < > 168*   < > 151*   < > 130*   < > 105*  114*   BUN  --   --   --   --  41.0*  --  38.0*  --  37.4*  --  43.2*   < > 73.3*   < > 111.0*   < > 84.7*   CR  --   --   --   --  1.61*  --  1.47*  --  1.59*  --  1.81*   < > 3.14*   < > 5.53*   < > 4.77*   GFRESTIMATED  --   --   --   --  47*  --  53*  --  48*  --  41*   < > 21*   < > 11*   < > 13*   NAZARIO  --   --   --   --  7.8*  --  7.7*  --  7.8*  --  8.0*   < > 8.3*   < > 8.4*   < > 8.0*   MAG  --   --  2.1  --  2.1  --  2.1  --  2.2  --  2.1   < > 2.2   < > 2.9*   < > 2.9*   PHOS  --   --   --   --  3.6  --  3.2  --  3.3  --  3.2   < > 4.1   < > 8.2*   < > 8.7*   PROTTOTAL  --   --   --   --   --   --   --   --  4.7*  --   --   --  4.7*  --  5.0*  --  4.9*   ALBUMIN  --   --   --   --  2.1*  --  2.1*  --  2.1*  --  2.0*   < > 2.2*   < > 2.3*  --  2.4*   BILITOTAL  --   --  1.8*  --   --   --   --   --  1.6*  --   --   --  2.2*  --  2.1*  --  2.3*   ALKPHOS  --   --  279*  --   --   --   --   --  261*  --   --   --  235*  --  206*  --  138*   AST  --   --  56*  --   --   --   --   --  62*  --   --   --  72*  --  66*  --  61*   ALT  --   --  95*  --   --   --   --   --  109*  --   --   --  130*  --  148*  --  158*    < > = values in this interval not displayed.     CBC  Recent Labs   Lab 06/17/23  0854 06/17/23  0410 06/16/23 2018 06/16/23  1158   HGB 7.2* 7.3* 7.6* 7.7*   WBC 14.2* 13.2* 14.7* 14.8*   RBC 2.42* 2.41* 2.51* 2.58*   HCT 23.4* 23.4* 24.0* 24.6*   MCV 97 97 96 95   MCH 29.8 30.3 30.3 29.8   MCHC 30.8* 31.2* 31.7 31.3*   RDW 18.3* 17.9* 17.7* 17.4*    153 144* 130*     INR  Recent Labs   Lab 06/17/23  0410 06/16/23  0424 06/15/23  0842 06/15/23  0417 06/14/23  2118 06/14/23  0357   INR 1.14 1.10  --  1.03  --  1.06   PTT  --   --  29 30 30 26     ABG  Recent Labs   Lab 06/15/23  1723 06/12/23  0535 06/11/23  1833 06/11/23  1244   PH 7.44 7.34* 7.34* 7.34*    PCO2 36 38 39 39   PO2 112* 176* 128* 124*   HCO3 24 20* 21 21   O2PER 2  2 28 2 5      Urine Studies  Recent Labs   Lab Test 06/16/23  1220 06/10/23  1613 06/05/23  1620 04/07/23  1246   COLOR Yellow Orange* Yellow Yellow   APPEARANCE Cloudy* Cloudy* Clear Clear   URINEGLC 150* 30* Negative Negative   URINEBILI Negative Negative Negative Negative   URINEKETONE Negative Negative Negative Negative   SG 1.014 1.021 1.012 1.011   UBLD Large* Large* Moderate* Moderate*   URINEPH 5.5 5.5 5.5 5.5   PROTEIN 70* 100* 10* 10*   NITRITE Negative Negative Negative Negative   LEUKEST Large* Large* Negative Negative   RBCU >182* >182* 2 5*   WBCU 56* 128* 7* 2     No lab results found.  PTH  Recent Labs   Lab Test 05/19/23  0956   PTHI 67*     Iron Studies  Recent Labs   Lab Test 11/22/22  0848   IRON 68   *   IRONSAT 31   HOLA 429*       IMAGING:  All imaging studies reviewed by me.

## 2023-06-18 ENCOUNTER — APPOINTMENT (OUTPATIENT)
Dept: PHYSICAL THERAPY | Facility: CLINIC | Age: 68
End: 2023-06-18
Attending: INTERNAL MEDICINE
Payer: COMMERCIAL

## 2023-06-18 LAB
ALBUMIN SERPL BCG-MCNC: 2.3 G/DL (ref 3.5–5.2)
ALBUMIN SERPL BCG-MCNC: 2.6 G/DL (ref 3.5–5.2)
ALBUMIN SERPL BCG-MCNC: 2.7 G/DL (ref 3.5–5.2)
ALP SERPL-CCNC: 220 U/L (ref 40–129)
ALT SERPL W P-5'-P-CCNC: 83 U/L (ref 0–70)
ANION GAP SERPL CALCULATED.3IONS-SCNC: 11 MMOL/L (ref 7–15)
ANION GAP SERPL CALCULATED.3IONS-SCNC: 12 MMOL/L (ref 7–15)
ANION GAP SERPL CALCULATED.3IONS-SCNC: 13 MMOL/L (ref 7–15)
APTT PPP: 46 SECONDS (ref 22–38)
AST SERPL W P-5'-P-CCNC: 51 U/L (ref 0–45)
BACTERIA BLD CULT: NO GROWTH
BACTERIA BLD CULT: NO GROWTH
BACTERIA UR CULT: NO GROWTH
BASOPHILS # BLD MANUAL: 0.1 10E3/UL (ref 0–0.2)
BASOPHILS NFR BLD MANUAL: 1 %
BILIRUB DIRECT SERPL-MCNC: 1.44 MG/DL (ref 0–0.3)
BILIRUB SERPL-MCNC: 1.7 MG/DL
BUN SERPL-MCNC: 20.3 MG/DL (ref 8–23)
BUN SERPL-MCNC: 20.7 MG/DL (ref 8–23)
BUN SERPL-MCNC: 21.4 MG/DL (ref 8–23)
CA-I BLD-MCNC: 4.3 MG/DL (ref 4.4–5.2)
CA-I BLD-MCNC: 4.4 MG/DL (ref 4.4–5.2)
CA-I BLD-MCNC: 4.5 MG/DL (ref 4.4–5.2)
CALCIUM SERPL-MCNC: 8 MG/DL (ref 8.8–10.2)
CALCIUM SERPL-MCNC: 8 MG/DL (ref 8.8–10.2)
CALCIUM SERPL-MCNC: 8.2 MG/DL (ref 8.8–10.2)
CF REDUC 60M P MA LENFR BLD TEG: 0.2 % (ref 0–15)
CFT BLD TEG: 0.8 MINUTE (ref 1–3)
CHLORIDE SERPL-SCNC: 101 MMOL/L (ref 98–107)
CI (COAGULATION INDEX)(Z) NON NATIVE: 3.6 (ref -3–3)
CLOT ANGLE BLD TEG: 77.2 DEGREES (ref 53–72)
CLOT INIT BLD TEG: 4.2 MINUTE (ref 5–10)
CLOT LYSIS 30M P MA LENFR BLD TEG: 0 % (ref 0–8)
CLOT STRENGTH BLD TEG: 12.1 KD/SC (ref 4.5–11)
CREAT SERPL-MCNC: 1.06 MG/DL (ref 0.67–1.17)
CREAT SERPL-MCNC: 1.08 MG/DL (ref 0.67–1.17)
CREAT SERPL-MCNC: 1.14 MG/DL (ref 0.67–1.17)
D DIMER PPP FEU-MCNC: 4.46 UG/ML FEU (ref 0–0.5)
DEPRECATED HCO3 PLAS-SCNC: 21 MMOL/L (ref 22–29)
DEPRECATED HCO3 PLAS-SCNC: 21 MMOL/L (ref 22–29)
DEPRECATED HCO3 PLAS-SCNC: 23 MMOL/L (ref 22–29)
ELLIPTOCYTES BLD QL SMEAR: ABNORMAL
EOSINOPHIL # BLD MANUAL: 0.1 10E3/UL (ref 0–0.7)
EOSINOPHIL NFR BLD MANUAL: 1 %
ERYTHROCYTE [DISTWIDTH] IN BLOOD BY AUTOMATED COUNT: 19 % (ref 10–15)
ERYTHROCYTE [DISTWIDTH] IN BLOOD BY AUTOMATED COUNT: 19.3 % (ref 10–15)
ERYTHROCYTE [DISTWIDTH] IN BLOOD BY AUTOMATED COUNT: 19.5 % (ref 10–15)
FIBRINOGEN PPP-MCNC: 1008 MG/DL (ref 170–490)
FIBRINOGEN PPP-MCNC: 842 MG/DL (ref 170–490)
GFR SERPL CREATININE-BSD FRML MDRD: 71 ML/MIN/1.73M2
GFR SERPL CREATININE-BSD FRML MDRD: 76 ML/MIN/1.73M2
GFR SERPL CREATININE-BSD FRML MDRD: 78 ML/MIN/1.73M2
GLUCOSE BLDC GLUCOMTR-MCNC: 102 MG/DL (ref 70–99)
GLUCOSE BLDC GLUCOMTR-MCNC: 106 MG/DL (ref 70–99)
GLUCOSE BLDC GLUCOMTR-MCNC: 150 MG/DL (ref 70–99)
GLUCOSE BLDC GLUCOMTR-MCNC: 175 MG/DL (ref 70–99)
GLUCOSE BLDC GLUCOMTR-MCNC: 192 MG/DL (ref 70–99)
GLUCOSE SERPL-MCNC: 108 MG/DL (ref 70–99)
GLUCOSE SERPL-MCNC: 148 MG/DL (ref 70–99)
GLUCOSE SERPL-MCNC: 237 MG/DL (ref 70–99)
HCT VFR BLD AUTO: 23.2 % (ref 40–53)
HCT VFR BLD AUTO: 24.5 % (ref 40–53)
HCT VFR BLD AUTO: 25 % (ref 40–53)
HGB BLD-MCNC: 7.4 G/DL (ref 13.3–17.7)
HGB BLD-MCNC: 7.5 G/DL (ref 13.3–17.7)
HGB BLD-MCNC: 7.9 G/DL (ref 13.3–17.7)
INR PPP: 1.03 (ref 0.85–1.15)
INR PPP: 1.11 (ref 0.85–1.15)
LDH SERPL L TO P-CCNC: 391 U/L (ref 0–250)
LYMPHOCYTES # BLD MANUAL: 0 10E3/UL (ref 0.8–5.3)
LYMPHOCYTES NFR BLD MANUAL: 0 %
MAGNESIUM SERPL-MCNC: 2.2 MG/DL (ref 1.7–2.3)
MCF BLD TEG: 70.7 MM (ref 50–70)
MCH RBC QN AUTO: 29.6 PG (ref 26.5–33)
MCH RBC QN AUTO: 30.4 PG (ref 26.5–33)
MCH RBC QN AUTO: 30.5 PG (ref 26.5–33)
MCHC RBC AUTO-ENTMCNC: 30.6 G/DL (ref 31.5–36.5)
MCHC RBC AUTO-ENTMCNC: 31.6 G/DL (ref 31.5–36.5)
MCHC RBC AUTO-ENTMCNC: 31.9 G/DL (ref 31.5–36.5)
MCV RBC AUTO: 96 FL (ref 78–100)
MCV RBC AUTO: 97 FL (ref 78–100)
METAMYELOCYTES # BLD MANUAL: 0.2 10E3/UL
METAMYELOCYTES NFR BLD MANUAL: 2 %
MONOCYTES # BLD MANUAL: 0.2 10E3/UL (ref 0–1.3)
MONOCYTES NFR BLD MANUAL: 2 %
NEUTROPHILS # BLD MANUAL: 11.6 10E3/UL (ref 1.6–8.3)
NEUTROPHILS NFR BLD MANUAL: 94 %
PHOSPHATE SERPL-MCNC: 3.8 MG/DL (ref 2.5–4.5)
PHOSPHATE SERPL-MCNC: 4 MG/DL (ref 2.5–4.5)
PHOSPHATE SERPL-MCNC: 4.1 MG/DL (ref 2.5–4.5)
PLAT MORPH BLD: ABNORMAL
PLATELET # BLD AUTO: 136 10E3/UL (ref 150–450)
PLATELET # BLD AUTO: 136 10E3/UL (ref 150–450)
PLATELET # BLD AUTO: 151 10E3/UL (ref 150–450)
PLATELET # BLD AUTO: 178 10E3/UL (ref 150–450)
PLATELET # BLD AUTO: 179 10E3/UL (ref 150–450)
POTASSIUM SERPL-SCNC: 3.8 MMOL/L (ref 3.4–5.3)
POTASSIUM SERPL-SCNC: 4.1 MMOL/L (ref 3.4–5.3)
POTASSIUM SERPL-SCNC: 4.5 MMOL/L (ref 3.4–5.3)
PROT SERPL-MCNC: 4.7 G/DL (ref 6.4–8.3)
RBC # BLD AUTO: 2.43 10E6/UL (ref 4.4–5.9)
RBC # BLD AUTO: 2.53 10E6/UL (ref 4.4–5.9)
RBC # BLD AUTO: 2.6 10E6/UL (ref 4.4–5.9)
RBC MORPH BLD: ABNORMAL
SODIUM SERPL-SCNC: 134 MMOL/L (ref 136–145)
SODIUM SERPL-SCNC: 135 MMOL/L (ref 136–145)
SODIUM SERPL-SCNC: 135 MMOL/L (ref 136–145)
UFH PPP CHRO-ACNC: 0.18 IU/ML
UFH PPP CHRO-ACNC: 0.18 IU/ML
UFH PPP CHRO-ACNC: 0.21 IU/ML
UFH PPP CHRO-ACNC: 0.27 IU/ML
WBC # BLD AUTO: 12.3 10E3/UL (ref 4–11)
WBC # BLD AUTO: 15 10E3/UL (ref 4–11)
WBC # BLD AUTO: 15.1 10E3/UL (ref 4–11)

## 2023-06-18 PROCEDURE — 250N000011 HC RX IP 250 OP 636: Performed by: TRANSPLANT SURGERY

## 2023-06-18 PROCEDURE — P9045 ALBUMIN (HUMAN), 5%, 250 ML: HCPCS | Performed by: STUDENT IN AN ORGANIZED HEALTH CARE EDUCATION/TRAINING PROGRAM

## 2023-06-18 PROCEDURE — 85730 THROMBOPLASTIN TIME PARTIAL: CPT | Performed by: STUDENT IN AN ORGANIZED HEALTH CARE EDUCATION/TRAINING PROGRAM

## 2023-06-18 PROCEDURE — 250N000013 HC RX MED GY IP 250 OP 250 PS 637: Performed by: STUDENT IN AN ORGANIZED HEALTH CARE EDUCATION/TRAINING PROGRAM

## 2023-06-18 PROCEDURE — 85027 COMPLETE CBC AUTOMATED: CPT | Performed by: SURGERY

## 2023-06-18 PROCEDURE — 82330 ASSAY OF CALCIUM: CPT | Performed by: INTERNAL MEDICINE

## 2023-06-18 PROCEDURE — 97530 THERAPEUTIC ACTIVITIES: CPT | Mod: GP

## 2023-06-18 PROCEDURE — 85027 COMPLETE CBC AUTOMATED: CPT | Performed by: INTERNAL MEDICINE

## 2023-06-18 PROCEDURE — 250N000013 HC RX MED GY IP 250 OP 250 PS 637: Performed by: SURGERY

## 2023-06-18 PROCEDURE — 85396 CLOTTING ASSAY WHOLE BLOOD: CPT | Performed by: STUDENT IN AN ORGANIZED HEALTH CARE EDUCATION/TRAINING PROGRAM

## 2023-06-18 PROCEDURE — 83735 ASSAY OF MAGNESIUM: CPT | Performed by: INTERNAL MEDICINE

## 2023-06-18 PROCEDURE — 85610 PROTHROMBIN TIME: CPT | Performed by: SURGERY

## 2023-06-18 PROCEDURE — 85027 COMPLETE CBC AUTOMATED: CPT | Performed by: STUDENT IN AN ORGANIZED HEALTH CARE EDUCATION/TRAINING PROGRAM

## 2023-06-18 PROCEDURE — 83615 LACTATE (LD) (LDH) ENZYME: CPT | Performed by: STUDENT IN AN ORGANIZED HEALTH CARE EDUCATION/TRAINING PROGRAM

## 2023-06-18 PROCEDURE — 250N000009 HC RX 250: Performed by: INTERNAL MEDICINE

## 2023-06-18 PROCEDURE — 84450 TRANSFERASE (AST) (SGOT): CPT | Performed by: SURGERY

## 2023-06-18 PROCEDURE — 85384 FIBRINOGEN ACTIVITY: CPT | Performed by: STUDENT IN AN ORGANIZED HEALTH CARE EDUCATION/TRAINING PROGRAM

## 2023-06-18 PROCEDURE — 250N000013 HC RX MED GY IP 250 OP 250 PS 637: Performed by: NURSE PRACTITIONER

## 2023-06-18 PROCEDURE — 250N000013 HC RX MED GY IP 250 OP 250 PS 637: Performed by: PHYSICIAN ASSISTANT

## 2023-06-18 PROCEDURE — 200N000002 HC R&B ICU UMMC

## 2023-06-18 PROCEDURE — 85379 FIBRIN DEGRADATION QUANT: CPT | Performed by: STUDENT IN AN ORGANIZED HEALTH CARE EDUCATION/TRAINING PROGRAM

## 2023-06-18 PROCEDURE — 250N000011 HC RX IP 250 OP 636: Performed by: STUDENT IN AN ORGANIZED HEALTH CARE EDUCATION/TRAINING PROGRAM

## 2023-06-18 PROCEDURE — 97110 THERAPEUTIC EXERCISES: CPT | Mod: GP

## 2023-06-18 PROCEDURE — 250N000013 HC RX MED GY IP 250 OP 250 PS 637

## 2023-06-18 PROCEDURE — 85520 HEPARIN ASSAY: CPT | Performed by: STUDENT IN AN ORGANIZED HEALTH CARE EDUCATION/TRAINING PROGRAM

## 2023-06-18 PROCEDURE — 99233 SBSQ HOSP IP/OBS HIGH 50: CPT | Performed by: INTERNAL MEDICINE

## 2023-06-18 PROCEDURE — 250N000013 HC RX MED GY IP 250 OP 250 PS 637: Performed by: TRANSPLANT SURGERY

## 2023-06-18 PROCEDURE — 84100 ASSAY OF PHOSPHORUS: CPT | Performed by: INTERNAL MEDICINE

## 2023-06-18 PROCEDURE — 85610 PROTHROMBIN TIME: CPT | Performed by: STUDENT IN AN ORGANIZED HEALTH CARE EDUCATION/TRAINING PROGRAM

## 2023-06-18 PROCEDURE — 82247 BILIRUBIN TOTAL: CPT | Performed by: SURGERY

## 2023-06-18 PROCEDURE — 250N000012 HC RX MED GY IP 250 OP 636 PS 637: Performed by: PHYSICIAN ASSISTANT

## 2023-06-18 PROCEDURE — 250N000012 HC RX MED GY IP 250 OP 636 PS 637: Performed by: INTERNAL MEDICINE

## 2023-06-18 PROCEDURE — 85384 FIBRINOGEN ACTIVITY: CPT | Performed by: PHYSICIAN ASSISTANT

## 2023-06-18 PROCEDURE — 258N000003 HC RX IP 258 OP 636: Performed by: TRANSPLANT SURGERY

## 2023-06-18 PROCEDURE — 82248 BILIRUBIN DIRECT: CPT | Performed by: SURGERY

## 2023-06-18 PROCEDURE — 250N000011 HC RX IP 250 OP 636: Performed by: INTERNAL MEDICINE

## 2023-06-18 PROCEDURE — 85520 HEPARIN ASSAY: CPT | Performed by: TRANSPLANT SURGERY

## 2023-06-18 PROCEDURE — 94799 UNLISTED PULMONARY SVC/PX: CPT

## 2023-06-18 PROCEDURE — 85007 BL SMEAR W/DIFF WBC COUNT: CPT | Performed by: SURGERY

## 2023-06-18 RX ORDER — METHOCARBAMOL 500 MG/1
500 TABLET, FILM COATED ORAL EVERY 6 HOURS
Status: DISCONTINUED | OUTPATIENT
Start: 2023-06-18 | End: 2023-06-23

## 2023-06-18 RX ORDER — URSODIOL 250 MG/1
250 TABLET, FILM COATED ORAL 2 TIMES DAILY
Status: DISCONTINUED | OUTPATIENT
Start: 2023-06-18 | End: 2023-06-25 | Stop reason: HOSPADM

## 2023-06-18 RX ORDER — ATORVASTATIN CALCIUM 20 MG/1
20 TABLET, FILM COATED ORAL EVERY EVENING
Status: DISCONTINUED | OUTPATIENT
Start: 2023-06-18 | End: 2023-06-25 | Stop reason: HOSPADM

## 2023-06-18 RX ORDER — SIMETHICONE 80 MG
80 TABLET,CHEWABLE ORAL EVERY 6 HOURS PRN
Status: DISCONTINUED | OUTPATIENT
Start: 2023-06-18 | End: 2023-06-25 | Stop reason: HOSPADM

## 2023-06-18 RX ORDER — VIT B COMP NO.3/FOLIC/C/BIOTIN 1 MG-60 MG
1 TABLET ORAL DAILY
Status: DISCONTINUED | OUTPATIENT
Start: 2023-06-19 | End: 2023-06-25 | Stop reason: HOSPADM

## 2023-06-18 RX ORDER — DEXTROSE MONOHYDRATE 100 MG/ML
INJECTION, SOLUTION INTRAVENOUS CONTINUOUS PRN
Status: DISCONTINUED | OUTPATIENT
Start: 2023-06-18 | End: 2023-06-25 | Stop reason: HOSPADM

## 2023-06-18 RX ORDER — ASPIRIN 325 MG
325 TABLET ORAL DAILY
Status: DISCONTINUED | OUTPATIENT
Start: 2023-06-19 | End: 2023-06-25 | Stop reason: HOSPADM

## 2023-06-18 RX ORDER — SENNOSIDES 8.6 MG
8.6 TABLET ORAL DAILY PRN
Status: DISCONTINUED | OUTPATIENT
Start: 2023-06-18 | End: 2023-06-25 | Stop reason: HOSPADM

## 2023-06-18 RX ORDER — POLYETHYLENE GLYCOL 3350 17 G/17G
17 POWDER, FOR SOLUTION ORAL DAILY PRN
Status: DISCONTINUED | OUTPATIENT
Start: 2023-06-18 | End: 2023-06-25 | Stop reason: HOSPADM

## 2023-06-18 RX ORDER — BUMETANIDE 0.25 MG/ML
2 INJECTION INTRAMUSCULAR; INTRAVENOUS 2 TIMES DAILY
Status: COMPLETED | OUTPATIENT
Start: 2023-06-18 | End: 2023-06-18

## 2023-06-18 RX ORDER — HEPARIN SODIUM 10000 [USP'U]/100ML
0-5000 INJECTION, SOLUTION INTRAVENOUS CONTINUOUS
Status: DISCONTINUED | OUTPATIENT
Start: 2023-06-18 | End: 2023-06-21

## 2023-06-18 RX ORDER — SIMETHICONE 80 MG
80 TABLET,CHEWABLE ORAL EVERY 6 HOURS PRN
Status: DISCONTINUED | OUTPATIENT
Start: 2023-06-18 | End: 2023-06-18

## 2023-06-18 RX ADMIN — DICLOFENAC SODIUM 4 G: 10 GEL TOPICAL at 20:33

## 2023-06-18 RX ADMIN — B-COMPLEX W/ C & FOLIC ACID TAB 1 MG 1 TABLET: 1 TAB at 07:38

## 2023-06-18 RX ADMIN — METHOCARBAMOL 500 MG: 500 TABLET ORAL at 07:38

## 2023-06-18 RX ADMIN — DICLOFENAC SODIUM 4 G: 10 GEL TOPICAL at 13:57

## 2023-06-18 RX ADMIN — CALCIUM CHLORIDE, MAGNESIUM CHLORIDE, SODIUM CHLORIDE, SODIUM BICARBONATE, POTASSIUM CHLORIDE AND SODIUM PHOSPHATE DIBASIC DIHYDRATE 12.5 ML/KG/HR: 3.68; 3.05; 6.34; 3.09; .314; .187 INJECTION INTRAVENOUS at 07:37

## 2023-06-18 RX ADMIN — MYCOPHENOLATE MOFETIL 750 MG: 200 POWDER, FOR SUSPENSION ORAL at 17:43

## 2023-06-18 RX ADMIN — METHOCARBAMOL 500 MG: 500 TABLET ORAL at 13:41

## 2023-06-18 RX ADMIN — ATORVASTATIN CALCIUM 20 MG: 20 TABLET, FILM COATED ORAL at 20:14

## 2023-06-18 RX ADMIN — SIMETHICONE 80 MG: 80 TABLET, CHEWABLE ORAL at 20:14

## 2023-06-18 RX ADMIN — SODIUM BICARBONATE 650 MG: 650 TABLET ORAL at 07:38

## 2023-06-18 RX ADMIN — ONDANSETRON 4 MG: 4 TABLET, ORALLY DISINTEGRATING ORAL at 07:59

## 2023-06-18 RX ADMIN — SODIUM BICARBONATE 650 MG: 650 TABLET ORAL at 13:41

## 2023-06-18 RX ADMIN — OXYCODONE HYDROCHLORIDE 2.5 MG: 5 TABLET ORAL at 07:38

## 2023-06-18 RX ADMIN — URSODIOL 250 MG: 250 TABLET, FILM COATED ORAL at 20:14

## 2023-06-18 RX ADMIN — CALCIUM CHLORIDE, MAGNESIUM CHLORIDE, SODIUM CHLORIDE, SODIUM BICARBONATE, POTASSIUM CHLORIDE AND SODIUM PHOSPHATE DIBASIC DIHYDRATE 12.5 ML/KG/HR: 3.68; 3.05; 6.34; 3.09; .314; .187 INJECTION INTRAVENOUS at 23:06

## 2023-06-18 RX ADMIN — METHOCARBAMOL 500 MG: 500 TABLET ORAL at 20:14

## 2023-06-18 RX ADMIN — ALBUMIN HUMAN 12.5 G: 0.05 INJECTION, SOLUTION INTRAVENOUS at 12:15

## 2023-06-18 RX ADMIN — CALCIUM CHLORIDE, MAGNESIUM CHLORIDE, SODIUM CHLORIDE, SODIUM BICARBONATE, POTASSIUM CHLORIDE AND SODIUM PHOSPHATE DIBASIC DIHYDRATE 12.5 ML/KG/HR: 3.68; 3.05; 6.34; 3.09; .314; .187 INJECTION INTRAVENOUS at 11:30

## 2023-06-18 RX ADMIN — MIDODRINE HYDROCHLORIDE 20 MG: 5 TABLET ORAL at 13:41

## 2023-06-18 RX ADMIN — HEPARIN SODIUM 1950 UNITS/HR: 10000 INJECTION, SOLUTION INTRAVENOUS at 16:33

## 2023-06-18 RX ADMIN — ASPIRIN 325 MG: 325 TABLET ORAL at 07:38

## 2023-06-18 RX ADMIN — ONDANSETRON 4 MG: 2 INJECTION INTRAMUSCULAR; INTRAVENOUS at 00:02

## 2023-06-18 RX ADMIN — PREDNISONE 10 MG: 10 TABLET ORAL at 07:38

## 2023-06-18 RX ADMIN — BUMETANIDE 2 MG: 0.25 INJECTION INTRAMUSCULAR; INTRAVENOUS at 12:14

## 2023-06-18 RX ADMIN — Medication 40 MG: at 07:39

## 2023-06-18 RX ADMIN — CALCIUM CHLORIDE, MAGNESIUM CHLORIDE, SODIUM CHLORIDE, SODIUM BICARBONATE, POTASSIUM CHLORIDE AND SODIUM PHOSPHATE DIBASIC DIHYDRATE 12.5 ML/KG/HR: 3.68; 3.05; 6.34; 3.09; .314; .187 INJECTION INTRAVENOUS at 03:35

## 2023-06-18 RX ADMIN — SODIUM BICARBONATE 650 MG: 650 TABLET ORAL at 20:14

## 2023-06-18 RX ADMIN — CALCIUM CHLORIDE, MAGNESIUM CHLORIDE, SODIUM CHLORIDE, SODIUM BICARBONATE, POTASSIUM CHLORIDE AND SODIUM PHOSPHATE DIBASIC DIHYDRATE 12.5 ML/KG/HR: 3.68; 3.05; 6.34; 3.09; .314; .187 INJECTION INTRAVENOUS at 18:57

## 2023-06-18 RX ADMIN — HEPARIN SODIUM 1800 UNITS/HR: 10000 INJECTION, SOLUTION INTRAVENOUS at 05:50

## 2023-06-18 RX ADMIN — VANCOMYCIN HYDROCHLORIDE 1750 MG: 1 INJECTION, POWDER, LYOPHILIZED, FOR SOLUTION INTRAVENOUS at 20:11

## 2023-06-18 RX ADMIN — Medication 12.5 MG: at 13:41

## 2023-06-18 RX ADMIN — BUMETANIDE 2 MG: 0.25 INJECTION INTRAMUSCULAR; INTRAVENOUS at 20:10

## 2023-06-18 RX ADMIN — Medication 12.5 MG: at 06:07

## 2023-06-18 RX ADMIN — CALCIUM CHLORIDE, MAGNESIUM CHLORIDE, SODIUM CHLORIDE, SODIUM BICARBONATE, POTASSIUM CHLORIDE AND SODIUM PHOSPHATE DIBASIC DIHYDRATE 12.5 ML/KG/HR: 3.68; 3.05; 6.34; 3.09; .314; .187 INJECTION INTRAVENOUS at 11:29

## 2023-06-18 RX ADMIN — CALCIUM CHLORIDE, MAGNESIUM CHLORIDE, SODIUM CHLORIDE, SODIUM BICARBONATE, POTASSIUM CHLORIDE AND SODIUM PHOSPHATE DIBASIC DIHYDRATE 12.5 ML/KG/HR: 3.68; 3.05; 6.34; 3.09; .314; .187 INJECTION INTRAVENOUS at 15:21

## 2023-06-18 RX ADMIN — METHOCARBAMOL 500 MG: 500 TABLET ORAL at 02:08

## 2023-06-18 RX ADMIN — MIDODRINE HYDROCHLORIDE 20 MG: 5 TABLET ORAL at 22:00

## 2023-06-18 RX ADMIN — CALCIUM CHLORIDE, MAGNESIUM CHLORIDE, SODIUM CHLORIDE, SODIUM BICARBONATE, POTASSIUM CHLORIDE AND SODIUM PHOSPHATE DIBASIC DIHYDRATE: 3.68; 3.05; 6.34; 3.09; .314; .187 INJECTION INTRAVENOUS at 22:46

## 2023-06-18 RX ADMIN — CALCIUM CHLORIDE, MAGNESIUM CHLORIDE, SODIUM CHLORIDE, SODIUM BICARBONATE, POTASSIUM CHLORIDE AND SODIUM PHOSPHATE DIBASIC DIHYDRATE 12.5 ML/KG/HR: 3.68; 3.05; 6.34; 3.09; .314; .187 INJECTION INTRAVENOUS at 03:33

## 2023-06-18 RX ADMIN — CALCIUM CHLORIDE, MAGNESIUM CHLORIDE, SODIUM CHLORIDE, SODIUM BICARBONATE, POTASSIUM CHLORIDE AND SODIUM PHOSPHATE DIBASIC DIHYDRATE 12.5 ML/KG/HR: 3.68; 3.05; 6.34; 3.09; .314; .187 INJECTION INTRAVENOUS at 23:05

## 2023-06-18 RX ADMIN — URSODIOL 250 MG: 250 TABLET, FILM COATED ORAL at 07:38

## 2023-06-18 RX ADMIN — MYCOPHENOLATE MOFETIL 750 MG: 200 POWDER, FOR SUSPENSION ORAL at 07:39

## 2023-06-18 RX ADMIN — MIDODRINE HYDROCHLORIDE 20 MG: 5 TABLET ORAL at 06:07

## 2023-06-18 RX ADMIN — CALCIUM CHLORIDE, MAGNESIUM CHLORIDE, SODIUM CHLORIDE, SODIUM BICARBONATE, POTASSIUM CHLORIDE AND SODIUM PHOSPHATE DIBASIC DIHYDRATE 12.5 ML/KG/HR: 3.68; 3.05; 6.34; 3.09; .314; .187 INJECTION INTRAVENOUS at 15:22

## 2023-06-18 RX ADMIN — VALGANCICLOVIR HYDROCHLORIDE 450 MG: 50 POWDER, FOR SOLUTION ORAL at 20:14

## 2023-06-18 RX ADMIN — SIMETHICONE 80 MG: 80 TABLET, CHEWABLE ORAL at 09:35

## 2023-06-18 RX ADMIN — DICLOFENAC SODIUM 4 G: 10 GEL TOPICAL at 10:18

## 2023-06-18 RX ADMIN — ONDANSETRON 4 MG: 4 TABLET, ORALLY DISINTEGRATING ORAL at 15:22

## 2023-06-18 RX ADMIN — Medication 12.5 MG: at 22:00

## 2023-06-18 RX ADMIN — CALCIUM GLUCONATE 2 G: 20 INJECTION, SOLUTION INTRAVENOUS at 13:37

## 2023-06-18 ASSESSMENT — ACTIVITIES OF DAILY LIVING (ADL)
ADLS_ACUITY_SCORE: 36
ADLS_ACUITY_SCORE: 36
ADLS_ACUITY_SCORE: 38
ADLS_ACUITY_SCORE: 36
ADLS_ACUITY_SCORE: 38
ADLS_ACUITY_SCORE: 34
ADLS_ACUITY_SCORE: 36

## 2023-06-18 NOTE — PROGRESS NOTES
SURGICAL ICU PROGRESS NOTE  2023      PRIMARY TEAM: Transplant Surgery  PRIMARY PHYSICIAN: Dr. Clifton  REASON FOR ICU ADMISSION/CONSULT: Close hemodynamic monitoring, CRRT  ADMITTING PHYSICIAN: Dr. 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 diff, and HTN.  He is now s/p  donor liver and kidney transplant on 23 with Dr. Clifton. He was discharged from the SICU on  and readmitted on  due to the need for CRRT per Nephrology.    Interval Events   Patient remained on CRRT with fluid removed, for I/O net negative 1168 mL. He received 1u pRBC for Hgb 7.0 and responded appropriately. He received heparin straight rate 1800u/h.    TODAY'S PLAN (2023):  - Start Voltaren gel for feet  - Repeat TEG (without heparinase) --> TEG demonstrates hypercoagulability  - Start cycling TF tonight, discuss with RD  - Continue midodrine 20 mg TID   - Repeat albumin bolus once, with 2 mg Bumex x 2 today per Transplant Nephrology   - Try to pull 0-100cc/hr (goal 25cc neg per hour) on CRRT today; stop if hypotension requires addition of pressor.  - Regular diet with supplements  - Start cyclic tube feeds at night  - Continue vancomycin (stop date is )  - Will re-discuss anticoagulation plan for Afib, thrombus with Transplant --> Transplant contacted; okay for low intensity heparin drip now     Neurological:  # Acute post-surgical pain   - Monitor neurological status. Delirium preventions and  precautions  - Sedation plan: None indicated  - Pain control: PRN oxycodone 2.5-5 mg q4h, Robaxin 500 q6h     # Aphasia, right sided gaze preference, inability to follow commands, resolved  # Encephalopathy  # Hx Alcohol use disorder, sober since 2016  - Stroke code called ~0621 6/12  - Appreciate Neurology evaluation and assistance  - Head CT w/o contrast with no acute intracranial pathology   - MRA head/neck (w/o contrast) with no acute pathology related to presentation  - vEEG showing encephalopathy and negative for epileptiform activity   - Per discussion with patient's wife, symptoms are consistent with prior episodes of encephalopathy with increased ammonia levels (including gaze preference). Ammonia 18 6/12.   - Neurologic status improving. Alert and oriented x4 today 6/17. Continue with stimulation and activity. If no improvement with resolution of uremia, consider replacing tacrolimus with cyclosporine per Transplant.    - BUN 21.4 (prior 41.0)     Pulmonary:   # Post operative ventilatory support, resolved  # Acute hypoxic respiratory failure requiring mechanical ventilation, resolved  # Bilateral small pleural effusions  - Successfully extubated 6/7 to NC. Desaturations overnight 6/17 improved with 2 LPM oxymask. Maintaining oxygen saturations on 2 LPM NC.   - Aggressive pulmonary hygiene, IS, encourage OOB  - Supplemental O2 as needed to maintain SpO2 > 92%  - Continue chest physiotherapy     Cardiovascular:    #Hx pAfib on PTA warfarin  # Afib with RVR, improving  #Hx CAD  #Hx HTN  #Hypotension, orthostatic  #Shock, likely septic- improving  - ECHO 6/5/23: afib, LVEF 55-60%  - MAP goal 60-85, SBP goal 110-160. 6/10 peripheral levophed started for persistent hypotension, off since 0100 6/12. Restarted 8 pm on 6/14. Currently off norepinephrine since 6/17 afternoon.  - s/p LIJ CVC placement 6/15. CT chest showed LIJ CVC catheter in left internal jugular vein with indeterminate position of tip.  Discontinued 6/16 due to suboptimal positioning after replacement with L basilic PICC by IR   - Continue midodrine 20 mg TID    - PTA metoprolol succinate ER 25 daily. Metoprolol tartrate 25mg q8h on 6/13.    - 12.5 metoprolol tartrate TID  - Metoprolol 5 mg IV PRN for sustained tachycardia >120  - Continue to hold PTA warfarin  - Continue ASA 81 mg daily, PTA atorvastatin hold until outpatient  - Cardiology consulted; Signed off 6/11    -- TTE completed, EF 55-60%, normal ventricular function   -- Allow for permissive tachycardia, long term goal HR < 110, okay for spikes to 120-130s   -- Anticoagulation for Afib when safe from a surgical standpoint,     -- Heparin gtt post IR procedure per transplant; Start at 600 u/hr non-titratable per Transplant attending. Currently 1800 units/hour with heparin Xa in goal range (0.18).      - Discussed previously with Transplant changing heparin gtt plan to standard ICU nursing protocol. Will rediscuss anticoagulation plan with Transplant Surgery.    Gastroenterology/Nutrition:  # Post-operative ileus, resolved  # At risk for malnutrition  # Hx of decompensated alcohol related + hereditary hemochromatosis (homozygous H63D) cirrhosis (MELD-Na 30) complicated by variceal bleeding s/p TIPS (10/1/2022) and hepatic encephalopathy now s/p DDLT 6/6  # Direct hyperbilirubinemia  - 6/7 repeat liver US: persistent low resistance waveforms and low resistive indices throughout hepatic artery system concerning for ischemia, stable velocities and upstroke  - Daily hepatic panel:   - Bilirubin remains elevated today: TBili 1.7 (1.8), DBili 1.44 (1.42)   - Alk phos increasingly elevated today: 220 (279)   - AST and ALT downtrending: AST 51 (56), ALT 83 (95)   - Continue to monitor hepatic labs daily  -  mg daily   - Ursodiol 250 mg BID   - Post-pyloric feeding tube placed by Radiology 6/12  - Nutrition consulted and following, appreciate input.    - Bowel regimen, Senna PRN   - PPI  (Protonix)  - SLP evaluation . Recommendation of regular diet with thin liquids, supplements ordered.  - Ate 487 calories and 18g protein on . This is below his nutritional requirements.  - Cyclic tube feeds starting tonight.    Renal/ Fluids/Electrolytes:   #Lactic acidosis, resolved  # Hx ESRD on HD MWF (IgA nephropathy + ANCA vasculitis) now s/p DDKT 23  # Delayed graft function  - Expect some delayed graft function with  donor kidney, gradually increasing urine output over the past few days, responsive to diuretics  - Transplant Nephrology consulted and following, appreciate expertise  - Kidney ultrasound  with patent vessels. Renal ultrasound  without stenosis.    - CRRT discontinued 6/10, dialysis line removed due to positive blood cultures. Nephrology recommended CRRT be resumed () instead of HD due to tenous hemodynamics. IR placed line.   - Yesterday  pulled volume with CRRT for a 24-hour net negative 1168 mL   - Try to pull 0-100cc/hr (goal 25cc neg per hour) on CRRT today; stop if patient develops hypotension requiring norepinephrine  - Repeat albumin bolus once, with 2 mg Bumex x 2 today per Transplant Nephrology.   - Urine output:   - : 406 mL   - : 367 mL   - : 206 mL since midnight (average 14 mL/h)  - Lux in place, continue for close UOP monitoring  - Cr 1.14 (1.61 prior), BUN 21.4, continuing CRRT    # Hyperphosphatemia, resolved  - Phos 3.8   # Hypocalcemia, resolved  - Ionized Ca 4.4  # Hypermagnesemia, resolved    - Mg 2.2  # Metabolic acidosis with decreased serum bicarbonate  - Worsening bicarb levels recently, but slowly improving: bicarb 21 today on BMP (20 prior).   - Sodium bicarb 650 mg TID started per Nephrology     - Albumin bolus once, with 2 mg Bumex x 2 today per Transplant Nephrology     Endocrine:  # Concern for adrenal insufficiency  - Cosyntropin stimulation test  ordered given hemodynamic instability with hypotension and  pressor need.    - Result of cosyntropin 60 min cortisol 23.7, normal, thus the patient continued on his regular dose 10 mg oral prednisone (no stress dose steroids ordered).  - Hypotension improved and patient no longer requiring IV pressor since afternoon 6/17.  - No indication for stress dose steroids at this time    # Stress hyperglycemia, improving  -Currently on high correction sliding scale insulin. Goal to keep BG< 180 for optimal wound healing.   - -313, received 9u aspart    ID:  # Stress leukocytosis, improving  # Post-transplant ppx per transplant  - WBC 12.3 appropriately downtrending (prior 14.8). Afebrile.   -Perioperative antibiotics: Zosyn x 48 hrs (completed 6/8), micafungin x 14 days  -Immunosuppression per Transplant: no further doses of thymoglobulin, 1.5 mg BID oral tacrolimus, 10 mg oral prednisone taper to 5 mg for chronic use,  mg BID  -Prophylactic regimen: Valganciclovir, Bactrim Ppx     # Enterococcus bacteremia  # Septic shock, resolved  - 6/9 blood cultures x2 growing E. Faecium. 6/10, 6/11, 6/12 blood cx NGTD.   - Transplant ID consulted and following, appreciate recs  - Antibiotics:   - Vancomycin started 6/12. Continue through 20th per ID recommendations.    -  Linezolid and Zosyn stopped (610-6/12).   - Daily blood cultures until clear. Stopped daily blood cultures 6/13    Heme:     # Acute blood loss anemia   # Anemia of critical illness and chronic renal disease  # Hx hereditary hemochromatosis   # Thrombocytopenia 2/2 liver dysfunction  - Transfusion goals: INR <2, Platelets > 20, Fibrinogen > 200, Hgb >8.0  - Hgb 7.4 (7.9 prior after 1u pRBC on 6/17), Plt 136, INR 1.03   - TEG 6/17 hypercoagulable    - Repeat TEG today --> hypercoagulable  - Restarted heparin drip 6/14, titrating based on Xa levels 6/14- morning of 6/18 (goal Xa is 0.15-0.2), 1800 U/hr and heparin Xa 0.18 in goal range this morning.    - Rediscussed anticoagulation plan for Afib, thrombus with  Transplant --> Transplant contacted; oksteph for low intensity heparin drip now     - Upper and Lower extremity DVT US 6/15 showed a partially occlusive inferior right internal jugular thrombus, with previous partially occlusive thrombus in the right axillary vein resolved.    Rheumatologic:  #Hx Gout  - Hold PTA prednisone 10mg daily  - Prednisone 10 mg daily. Transitioning methylprednisolone 8 mg taper back to prednisone 10 mg 6/13 per transplant. Taper to 5 mg for chronic use.   - Start Voltaren gel for pain in feet and ankles    Musculoskeletal:  # Weakness and deconditioning of critical illness   - Physical and occupational therapy consult, appreciate recommendations. Encourage increased time OOB when mental status appropriate.     Skin:  -Diligent skin care to prevent injury    General Cares/Prophylaxis:    DVT Prophylaxis: Heparin gtt, SCDs --> now low-intensity heparin gtt  GI Prophylaxis: Protonix BID  Restraints: Restraints for medical healing needed: No    Lines/ tubes/ drains:  - PIV x1  - (Transplant discontinued intraabdominal MAE drain x1 on 6/16)   - Axillary arterial line  - CVC R external jugular (dialysis line)   - PICC line, left basilic (6/16)  - NJ tube  - Lux catheter (exchanged 6/15)       Disposition:  - Surgical ICU    Discussed with Surgical Critical Care Attending, Dr. Truong Nowak M.D.    Cindy Hussein M.D.  General Surgery Resident PGY1  [facility]      - - - - - - - - - - - - - - - - - - - - - - - - - - - - - - - - - - - - - - - - - - - - - -   SUBJECTIVE:   See above for interval events.    Patient alert and oriented this morning. He reported some abdominal pain and mild nausea. Denied bloating, passing flatus. Denied chest pain, shortness of breath on 2 LPM NC, heartburn, lightheadedness or headache. Continued to have pain in feet which he said felt similar to past gout episodes.    PHYSICAL EXAMINATION:  Temp:  [97.4  F (36.3  C)-98.6  F (37  C)] 97.4  F  (36.3  C)  Pulse:  [] 96  Resp:  [10-27] 14  MAP:  [56 mmHg-95 mmHg] 73 mmHg  Arterial Line BP: ()/(45-89) 95/56  SpO2:  [81 %-100 %] 100 %     General: Adult male supine in bed, alert and interactive, NAD.    HEENT: NG and NJ secure in place. PERRL, EOMI. Mild scleral icterus.  Pulm/Resp: Breathing unlabored on 2L NC, equal chest rise, no audible wheezing. Lung sounds mildly diminished bilaterally. Coughing.  CV: Irregularly irregular rhythm on monitor, normal rate.  Abdomen: Soft, mildly distended, chevron incision without drainage, former MAE drain site with dressing C/D/I. Soft nontender reducible umbilical hernia.  : Lux catheter in place with minimal yellow urine output.   MSK/Extremities: Significant pitting peripheral edema on feet and ankles, mild erythema at medial aspect of right great toe. No edema above SCDs, peripheral pulses intact. Right arm bandaged at former PIV site.  Skin: Scattered purpura throughout face, chest, abdomen, and arms.  Neuro: Alert and oriented to self, place, date, and reason for hospitalization. Answers questions. Follows commands. Wiggles toes.    LABS: Reviewed.   Arterial Blood Gases   Recent Labs   Lab 06/15/23  1723 06/12/23  0535 06/11/23  1833 06/11/23  1244   PH 7.44 7.34* 7.34* 7.34*   PCO2 36 38 39 39   PO2 112* 176* 128* 124*   HCO3 24 20* 21 21     Complete Blood Count   Recent Labs   Lab 06/18/23  0342 06/17/23  1942 06/17/23  1137 06/17/23  0854   WBC 12.3*  12.3* 14.8* 16.3* 14.2*   HGB 7.4*  7.4* 7.9* 7.0* 7.2*   *  136* 147* 162 163     Basic Metabolic Panel  Recent Labs   Lab 06/18/23  0749 06/18/23  0342 06/18/23  0341 06/17/23  2347 06/17/23  1945 06/17/23  1942 06/17/23  1148 06/17/23  1137 06/17/23  0014 06/16/23 2018   NA  --  135*  --   --   --  136  --  136  --  140   POTASSIUM  --  3.8  --   --   --  4.0  --  3.9  --  3.7   CHLORIDE  --  101  --   --   --  103  --  102  --  107   CO2  --  21*  --   --   --  22  --  19*  --  20*    BUN  --  21.4  --   --   --  27.3*  --  30.3*  --  41.0*   CR  --  1.14  --   --   --  1.19*  --  1.16  --  1.61*   * 108* 106* 104*   < > 177*   < > 240*   < > 188*    < > = values in this interval not displayed.     Liver Function Tests  Recent Labs   Lab 06/18/23  0343 06/18/23  0342 06/17/23  1942 06/17/23  1137 06/17/23 0410 06/16/23 2018 06/16/23  1158 06/16/23  0424 06/15/23  1149 06/15/23  0417   AST  --  51*  --   --  56*  --   --  62*  --  72*   ALT  --  83*  --   --  95*  --   --  109*  --  130*   ALKPHOS  --  220*  --   --  279*  --   --  261*  --  235*   BILITOTAL  --  1.7*  --   --  1.8*  --   --  1.6*  --  2.2*   ALBUMIN  --  2.3* 2.5* 2.3*  --  2.1*   < > 2.1*   < > 2.2*   INR 1.03  --   --   --  1.14  --   --  1.10  --  1.03    < > = values in this interval not displayed.     Pancreatic Enzymes  No lab results found in last 7 days.  Coagulation Profile  Recent Labs   Lab 06/18/23  0343 06/17/23  0854 06/17/23  0410 06/16/23  0424 06/15/23  0842 06/15/23  0417 06/14/23  2118   INR 1.03  --  1.14 1.10  --  1.03  --    PTT  --  40*  --   --  29 30 30       IMAGING:  No results found for this or any previous visit (from the past 24 hour(s)).

## 2023-06-18 NOTE — PROGRESS NOTES
CRRT STATUS NOTE    DATA:  Time:  6:00 AM  Pressures WNL:  YES  Filter Status:  WDL    Problems Reported/Alarms Noted:  None.    Supplies Present:  YES    ASSESSMENT:  Patient Net Fluid Balance:  Net -1163 ml @ midnight, -362 ml @ 0600.  Vital Signs:  HR 97, BP 92/53, MAP 71  Labs:  K 3.8, Mg 2.2, Phos 3.8, iCa 4.5, Hgb 7.4, Plt 36  Goals of Therapy:  Net negative 0-100 ml/hr, meeting goal    INTERVENTIONS:   None.    PLAN:  Continue to monitor circuit daily and change set q72 hours or PRN for clotting/clogging. Please call CRRT RN with any questions/problems.

## 2023-06-18 NOTE — PROGRESS NOTES
[facility]   Transplant Nephrology Progress Note  Date of Admission:  6/5/2023  Today's Date: 06/18/2023    Recommendations:  - Will continue on CRRT until improved blood pressure or better kidney function.  - Continue midodrine.  - Would continue with albumin challenge and bumetanide 2 mg bid.  - Okay to pull some volume with CRRT, as tolerated, with pressor management per SICU team.  - Would make no changes to immunosuppression.  - Would consider kidney transplant biopsy in the next few days if no improvement in function.    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  Still oliguric.  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  Slow trend down in LFTs.  Followed by Transplant Surgery.    # Immunosuppression: Tacrolimus immediate release (goal 5-8), Mycophenolate mofetil (dose 750 mg every 12 hours) and Prednisone (dose 10 mg daily)    - Induction with Recent Transplant:  rATG total 4mg/kg, stopped due to sepsis   - Changes: Not at this time    # Infection Prophylaxis:   - PJP: Sulfa/TMP (Bactrim)  - CMV: Valganciclovir (Valcyte);CMV IgG Ab positive  - Thrush: Micafungin (Mycamine)  - Fungal: Micafungin (Mycamine)    # Hypotension: Hypotensive, now off pressors Goal BP: MAP > 65   - Volume status: Mildly hypervolemia  EDW ~ 96.4 kg (on HD)   - Changes: Not at this time; Would continue with  albumin and bumetanide.    - Okay to continue with volume with CRRT, as tolerated, and defer pressor requirements to SICU team.    # Elevated Blood Glucose: Glucose generally running ~ 140-180s'   - On insulin prn.   - Management as per primary team.    # Anemia in Chronic Renal Disease/Surgery: Hgb: Stable, low      FEDERICO: No   - Iron studies: Replete    - Transfusion for Hgb <7 mg/dl, management per primary team    # Mineral Bone Disorder:   - Secondary renal hyperparathyroidism; PTH level: Minimally elevated ( pg/ml)        On treatment: None  - Vitamin D; level: Normal        On supplement: No  - Calcium; level: Normal       On supplement: No  - Phosphorus; level: Normal     On binder: No    # Electrolytes:   - Potassium; level: Normal        On supplement: No  - Magnesium; level: Normal        On supplement: No  - Bicarbonate; level: Stable low        On supplement: Yes  - Sodium; level: Normal    # Paroxysmal A-Fib: Stable heart rate on beta blocker and anticoagulation with IV heparin.    # History of C.diff: Continues with loose stools, but negative C. diff with last check 6/11.    # E. Faecium Bacteremia: Diagnosed on 6/9 BlCx. Treated with vancomycin.  Did have a line holiday.  Trend down in WBC.    # ANCA Vasculitis: S/p Rituximab x2   - Will be on acute GN protocol post transplant.    # Gout: On prednisone 10 mg PO daily PTA. Currently on prednisone 10 with plan to eventually wean to prednisone 5 once hemoydynamically stable, other immunosuppression dose/trough at goal.    # Transplant History:  Etiology of Kidney Failure: IgA nephropathy and ANCA vasculitis  Tx: Liver Tx (SLK)  Transplant: 6/6/2023 (Liver), 6/6/2023 (Kidney)  Significant changes in immunosuppression: None  Significant transplant-related complications: None    Recommendations were communicated to the primary team verbally     Jeovany Scott MD   Pager: [phone]    Interval History   Mr. Quinones's creatinine is 1.14 (06/18 0342);  Stable on CRRT.  Minimal urine output and remains oliguric.  Trend down in transaminases.  Other significant labs/tests/vitals: Stable electrolytes.  Stable hemoglobin.  No new events overnight.  Continued incisional pain.  No chest pain or shortness of breath.  Some leg swelling.  No nausea and vomiting.  Bowel movements are loose.  No fever, sweats or chills.    Review of Systems   4 point ROS was obtained and negative except as noted in the Interval History.    MEDICATIONS:    aspirin  325 mg Oral Daily     atorvastatin  20 mg Oral QPM     [Held by provider] calcium acetate (phos binder)  667 mg Oral or Feeding Tube Q6H     heparin  3 mL Intracatheter Q24H     insulin aspart  1-12 Units Subcutaneous Q4H     methocarbamol  500 mg Oral Q6H     metoprolol tartrate  12.5 mg Oral Q8H KARAN     micafungin  100 mg Intravenous Q24H     midodrine  20 mg Oral or Feeding Tube Q8H     multivitamin RENAL  1 tablet Oral Daily     mycophenolate  750 mg Oral BID IS     pantoprazole  40 mg Per Feeding Tube QAM AC     predniSONE  10 mg Per Feeding Tube Daily     sodium bicarbonate  650 mg Oral or Feeding Tube TID     sodium chloride (PF)  10-40 mL Intracatheter Q7 Days     sodium chloride (PF)  3 mL Intravenous Q8H     sulfamethoxazole-trimethoprim  1 tablet Oral or Feeding Tube Once per day on      [Held by provider] tacrolimus  1.5 mg ORAL OR NJ TUBE BID IS     ursodiol  250 mg Oral BID     valGANciclovir  450 mg Oral Daily    Or     valGANciclovir  450 mg Oral or NG Tube Daily     vancomycin  1,750 mg (central catheter) Intravenous Q24H       dextrose       CRRT replacement solution 12.5 mL/kg/hr (23 0737)     heparin 1,800 Units/hr (23 0900)     - MEDICATION INSTRUCTIONS -       norepinephrine Stopped (23 1417)     CRRT replacement solution 200 mL/hr at 232     CRRT replacement solution 12.5 mL/kg/hr (23 0737)       Physical Exam   Temp  Av.9  F (36.6  C)  Min: 97  F (36.1  C)   "Max: 99.2  F (37.3  C)  Arterial Line BP  Min: 60/45  Max: 131/80  Arterial Line MAP (mmHg)  Av mmHg  Min: 52 mmHg  Max: 101 mmHg      Pulse  Av.5  Min: 68  Max: 159 Resp  Av.8  Min: 14  Max: 23  FiO2 (%)  Av.3 %  Min: 40 %  Max: 50 %  SpO2  Av.1 %  Min: 92 %  Max: 100 %    CVP (mmHg): 6 mmHgBP (!) 83/69   Pulse 96   Temp 97.4  F (36.3  C) (Axillary)   Resp 14   Ht 1.702 m (5' 7\")   Wt 106.7 kg (235 lb 3.7 oz)   SpO2 100%   BMI 36.84 kg/m      Admit Weight: 102.3 kg (225 lb 8 oz)     GENERAL APPEARANCE: alert and no distress  HENT: mouth without ulcers or lesions  RESP: lungs clear to auscultation - no rales, rhonchi or wheezes  CV: regular rhythm, normal rate, no rub, no murmur  EDEMA: trace to 1+ LE edema bilaterally  ABDOMEN: soft, nondistended, mildly tender, bowel sounds normal  MS: extremities normal - no gross deformities noted, no evidence of inflammation in joints, no muscle tenderness  SKIN: no rash  TX KIDNEY: mild TTP  DIALYSIS ACCESS:  Temporary catheter right internal jugular    Data   All labs reviewed by me.  CMP  Recent Labs   Lab 23  0749 23  0342 23  0341 23  2347 23  1945 23  1942 23  1148 23  1137 23  0412 23  0410 23  0014 23  0428 23  0424 06/15/23  0418 06/15/23  0417   NA  --  135*  --   --   --  136  --  136  --   --   --  140   < > 136   < > 137   POTASSIUM  --  3.8  --   --   --  4.0  --  3.9  --   --   --  3.7   < > 3.7   < > 3.7   CHLORIDE  --  101  --   --   --  103  --  102  --   --   --  107   < > 104   < > 103   CO2  --  21*  --   --   --  22  --  19*  --   --   --  20*   < > 20*   < > 18*   ANIONGAP  --  13  --   --   --  11  --  15  --   --   --  13   < > 12   < > 16*   * 108* 106* 104*   < > 177*   < > 240*   < >  --    < > 188*   < > 173*   < > 151*   BUN  --  21.4  --   --   --  27.3*  --  30.3*  --   --   --  41.0*   < > 37.4*   < > 73.3*   CR  " --  1.14  --   --   --  1.19*  --  1.16  --   --   --  1.61*   < > 1.59*   < > 3.14*   GFRESTIMATED  --  71  --   --   --  68  --  70  --   --   --  47*   < > 48*   < > 21*   NAZARIO  --  8.0*  --   --   --  8.2*  --  7.6*  --   --   --  7.8*   < > 7.8*   < > 8.3*   MAG  --  2.2  --   --   --  2.2  --  2.0  --  2.1  --  2.1   < > 2.2   < > 2.2   PHOS  --  3.8  --   --   --  3.6  --  3.4  --   --   --  3.6   < > 3.3   < > 4.1   PROTTOTAL  --  4.7*  --   --   --   --   --   --   --  4.5*  --   --   --  4.7*  --  4.7*   ALBUMIN  --  2.3*  --   --   --  2.5*  --  2.3*  --   --   --  2.1*   < > 2.1*   < > 2.2*   BILITOTAL  --  1.7*  --   --   --   --   --   --   --  1.8*  --   --   --  1.6*  --  2.2*   ALKPHOS  --  220*  --   --   --   --   --   --   --  279*  --   --   --  261*  --  235*   AST  --  51*  --   --   --   --   --   --   --  56*  --   --   --  62*  --  72*   ALT  --  83*  --   --   --   --   --   --   --  95*  --   --   --  109*  --  130*    < > = values in this interval not displayed.     CBC  Recent Labs   Lab 06/18/23  0342 06/17/23  1942 06/17/23  1137 06/17/23  0826   HGB 7.4*  7.4* 7.9* 7.0* 7.2*   WBC 12.3*  12.3* 14.8* 16.3* 14.2*   RBC 2.43*  2.43* 2.59* 2.28* 2.42*   HCT 23.2*  23.2* 24.5* 22.1* 23.4*   MCV 96  96 95 97 97   MCH 30.5  30.5 30.5 30.7 29.8   MCHC 31.9  31.9 32.2 31.7 30.8*   RDW 19.0*  19.0* 18.2* 18.1* 18.3*   *  136* 147* 162 163     INR  Recent Labs   Lab 06/18/23  0343 06/17/23  0854 06/17/23  0410 06/16/23  0424 06/15/23  0842 06/15/23  0417 06/14/23  2118   INR 1.03  --  1.14 1.10  --  1.03  --    PTT  --  40*  --   --  29 30 30     ABG  Recent Labs   Lab 06/15/23  1723 06/12/23  0535 06/11/23  1833 06/11/23  1244   PH 7.44 7.34* 7.34* 7.34*   PCO2 36 38 39 39   PO2 112* 176* 128* 124*   HCO3 24 20* 21 21   O2PER 2  2 28 2 5      Urine Studies  Recent Labs   Lab Test 06/16/23  1220 06/10/23  1613 06/05/23  1620 04/07/23  1246   COLOR Yellow Orange* Yellow Yellow    APPEARANCE Cloudy* Cloudy* Clear Clear   URINEGLC 150* 30* Negative Negative   URINEBILI Negative Negative Negative Negative   URINEKETONE Negative Negative Negative Negative   SG 1.014 1.021 1.012 1.011   UBLD Large* Large* Moderate* Moderate*   URINEPH 5.5 5.5 5.5 5.5   PROTEIN 70* 100* 10* 10*   NITRITE Negative Negative Negative Negative   LEUKEST Large* Large* Negative Negative   RBCU >182* >182* 2 5*   WBCU 56* 128* 7* 2     No lab results found.  PTH  Recent Labs   Lab Test 05/19/23  0956   PTHI 67*     Iron Studies  Recent Labs   Lab Test 11/22/22  0848   IRON 68   *   IRONSAT 31   HOLA 429*       IMAGING:  All imaging studies reviewed by me.

## 2023-06-18 NOTE — PROGRESS NOTES
CRRT STATUS NOTE    DATA:  Time:  1745  Pressures WNL:  YES  Filter Status:  WDL    Problems Reported/Alarms Noted:  Bedside RN, Chandler, reported some pressure alarms when patient standing.     Supplies Present:  YES    ASSESSMENT:  Patient Net Fluid Balance:  Net  -1846.42 at 1730  Vital Signs: 102/63 (79), , RR 18, 100% (2LPMs) 97.5 T  Labs (most recent first):  Creatinine: 1.08, 1.14, BUN 20.3, 21.4, GFR: 76, 71, K: 4.5, 3.8 Na: 134, 135,  Mg 2.2, Phos 4.1, 3.8 , iCa 4.3 (replaced), WBC: 15,000, Hgb 7.9, 7.5 , Plt 179, 178, Fibrinogen 1008, D-Dimer 4.46, +TEG    No current pressor support.  Heparin gtt: 1950 units/hr    06/18 0400  106.7 kg (235 lb 3.7 oz)   06/17 0430  107.6 kg (237 lb 3.4 oz)   06/15 0400  107.1 kg (236 lb 1.8 oz)       Goals of Therapy:  0-100 ml/hr to keep MAP >65, pressor requirements per SICU team    INTERVENTIONS:   Rounded with bedside RN and Renal MD Lux.     PLAN:  Continue to monitor circuit daily and change set q72 hours or PRN for clotting/clogging. Please call CRRT RN with any questions/problems.

## 2023-06-18 NOTE — PROGRESS NOTES
Nutrition Brief - MD consult: TF assess and order per MNT and Appreciate assistance starting cyclic tube feeds to run overnight.  Chart reviewed: Liver/kidney transplant on 6/6/23 -> readmitted to ICU due to need for CRRT    Diet:   Regular diet with thin liquids started 6/17 ( passed swallow evaluation 6/16 )   RS with assist + patient to order supplements with meals PRN + calorie count.    EN:   - Via NDT with Novasource renal @ 40 ml/hr + Prosource TF20 TID  - TF was discontinued yesterday with diet advancement. However patient with inadequate oral intake per calorie count and RN report.      Labs noted:   K+: 3.8, Mg++:2.2, Phos: 3.8 -> all normal range  BUN: 21.4, cr: 1.14, GFR: 71 (CRRT ongoing, still oliguric with 3+ moderate edema /anasarca).   Glucose: 108 ( on insulin)    GI/stool:   7-12 hensley stool recorded ( not on any anti motility meds)  - History of C.diff -> Continues with loose stools, but negative C. diff with last check 6/11.      Interventions:  1. Enteral Nutrition support recommendation:   - Access: NDT  - Dosing wt: 78 kg adjusted wt      - TF: Begin Overnight Cycle TF with Novasource renal, Initiate @ 20 ml/hr tonight and advance to goal 40 ml/hr in 6 hours.  Do not start or advance unless K+ >3.0, Mg++ > 1.5,  and Phos > 1.9.    - Novasorce renal @ 40 ml/hr overnight ( from 8:00 pm-8:00 am) to provide: (480 ml), 960 kcal (12 kcal/kg + pending PO), 44 gm pro ( 0.6 g/kg), 88 g CHO, 344 ml free water, and 0 g fiber daily.      Modules:  Continue with previous orders of Prosource protein supplement: 1 packet TID during the day   - TF + Prosource: 1200 kcal/day ( 15 kcal/kg + pending PO) + 104 gm protein/day ( 1.3 gm/kg).      - Free water flushes: 30 ml Q4 hrs for tube patency.        Anastasia Estrada RD/NATHALY  4A SICU RD pager: [phone]  Ascom: [phone]  Weekend/Holiday RD pager: [phone]

## 2023-06-18 NOTE — PROGRESS NOTES
Shift summary 0700 to 1930 today  Neuro: Continues to complain of pain in BLE. Voltaren gel added to regimen, one dose oxy given this morning. Pt states little improvement. Up in chair most of the afternoon. Unable to stand with PT, will hopefully try Moveo device tomorrow.   CV:IV devices functioning, peripheral pulses per baseline. HR below 100. MAP remains above 65 without vasoactive drips.   Pulm: Cough improving. Sats well on 2 litres nasal cannula.   GI: BM x4 this shift, continent on bedpan. Tolerates regular diet but in small quantities, complains of some nausea. Zofran x 2 doses today. Will start tube feeds on the overnight.   : Urine output improved after bumex albeit with delayed effect. Meets goals with CRRT of net negative 100cc/hr.   Skin: no new concerns.   Wife and daughter in to visit much of the day,very supportive of pt's progress.

## 2023-06-18 NOTE — PHARMACY-CONSULT NOTE
Pharmacy Tube Feeding Consult    Medication reviewed for administration by feeding tube and for potential food/drug interactions.    Recommendation: No changes are needed at this time.     Pharmacy will continue to follow as new medications are ordered.    Hazel Candelario, ShahanaD

## 2023-06-18 NOTE — PLAN OF CARE
Major Shift Events:   Neuro: A&Ox4, forgetful, moderate/weak strength in all ext, CMS at baseline  Cardiac: a fib, HR < 100 primarily, pulses palpable but weak, afebrile  Resp: lung sounds coarse/dim, sating > 92% 2L NC when awake (oxymask NOC, NIKOS like pattern), IS at bedside   GI: gas discomfort, bowel sounds faint, LBM this shift, loose stool   : stauffer intact with minimal, cloudy pavel urine, CRRT 0-100 per flowsheets   Skin: scattered skin tears, bruising, & petechiae; clamshell incision EDUARDO, kidney incision leaking serous drg, umbilical bulge present   Pain/Nausea: generalized joint/back pain treated with robaxin; int nausea treated with zofran  LDA: R PIV infusing heparin gtt; L midline (2) infusing abx; R HD CVC; NJT clamped, R brachial art line  Diet: regular, betsy counts  Mobility: 2 assist lift  Labs: reviewed, max WNL, 10a therapeutic  Plan: continue POC   For vital signs and complete assessments, please see documentation flowsheets.

## 2023-06-18 NOTE — PROGRESS NOTES
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 RTOR on  with concern for low flow in the renal graft - ex-lap, washout, closure with healthy appearing graft with intact arterial and venous flow.     s/p DBD simultaneous liver kidney transplant on 2023 with complex reconstruction of accessory right hepatic artery. POD #11  * RTOR on  with concern for low flow in the renal graft - ex-lap, washout, closure with healthy appearing graft with intact arterial and venous flow.      - LFTs trending down.   - Liver US w/doppler 2023 - low waveforms and low resistive indices, but stable velocities and upstrokes.   Stent: No biliary stent in place. Ursodiol 250 mg BID.  - ASA 325mg daily      H/o CKD related IgA nephropathy and ANCA vasculitis s/p  donor renal transplant on 2023 with ureteral/J stent placement, delayed graft function. Nephrology consulted + following. Latest renal US with doppler  with faster than expected iliac vein velocity above the anastomosis may represent edema and elevated superior arcuate artery resistive index.  UOP in the last 24 hours: 367 mL. CRRT 4.8L.   - Temporary HD line placed and CRRT re-started  given worsening renal dysfunction, uremia and encephalopathy.   - Nephrology recommends continuing CRRT until improved blood pressure.  - Nephrology suggest considering kidney biopsy if no improvement in function over the next few days.   - Will be on acute GN protocol post transplant.    Metabolic acidosis: CO2 21 today. Sodium bicarbonate 650 mg TID.      Immunosuppressed status secondary to medications:   Induction: Steroid taper and Thymoglobulin  400 mg (4 mg/kg) complete.   Maintenance:    -  mg BID  - Tacrolimus on HOLD  - Resumed PTA prednisone 10 mg for rheumatoid arthritis (see below). Will plan to taper down to 5 mg Prednisone for chronic use.     Hematology:   Acute on chronic anemia: Last transfused 6/17. Hgb 7-8, stable.   Leukocytosis: Afebrile, but slightly worsening leukocytosis. CT chest 6/16 with bibasilar atelectasis + small bilateral pleural effusion. Patchy densities in the left upper lobe anterolaterally. Intermittently requiring 2L NC. No other localizing s/sx of infection. Infectious workup negative to date.     Cardiology:  Coronary artery disease: Continue  mg daily and atorvastatin 20 mg daily.      Paroxysmal atrial fibrillation:  Cardiology consulted, recommended low dose beta blockade.              * Metoprolol 12.5 mg q8h + metoprolol 5mg IV PRN for sustained tachycardia > 120.               * Heparin gtt, ok to convert to low intensity heparin gtt today per Dr. Garcia.      Hypotension: Baseline hypotension with concern for sepsis with new finding of bacteremia (last positive blood culture was 6/9/23). 6/10 Echo: EF 55-60%.              * Midodrine 20 mg Q8H              * Vasopressors (6/6-6/12, 6/15-6/17) now weaned off     GI/Nutrition:   Severe malnutrition in the context of acute illness: Nutrition consulted. TF via NJ; cycle today. Regular diet with supplements.     Endocrine:   Post-operative elevated blood glucoses: HgA1c 6.1% (6/6/2023)              * Sliding scale insulin prn     Rheumatology:  Psoriatic arthritis: Prior to admission was on prednisone 10 mg daily, on prednisone taper as above.               *Goal to discharge on prednisone 5 mg; may consider cortisol stimulation test given long term steroid use     : Lux in place for strict I&Os    Neurologic + MSK:  Encephalopathy: Altered mental status was first noted 6/11/2023. Neurology consulted. Head CT without evidence of acute intra-cranial  pathology. MRI/MRA - small area of diffusion restriction on DWI without correlation on ADC. EEG consistent with severe encephalopathy, but no seizures. Etiology of encephalopathy likely multifactorial: worsening uremia and recent bacteremia (although clearance of bacteremia has not resulted to improvement in mental status). No sedating medications; no recent opioids  Encephalopathy has improved and patient's mental status is back to baseline in the setting of improving uremia; BUN at 43 today.  - Delirium precautions     Acute post operative pain:               * Methocarbamol 500mg q4h PRN              * Oxycodone 2.5-5mgm q4h PRN     H/o gout: Prednisone as above     Deconditioning/Weakness: OT and PT optimization     Alcohol use disorder: Continue sober supports post transplant. Continue complete sobriety.    Infectious disease:  Hepatitis B s Ag +: Noted to be positive pre-transplant on 6/5/23, but not previously positive (1/26/2023). HBV DNA negative on 6/5/23. No clear at risk behavior. Repeat Hep B s Ag was negative and HBV DNA not detected; suspect 6/5/2023 HBsAg positivity was a false positive.   Enterococcus Faecium bacteremia: 6/9 blood cultures positive for enterococcus faecium. No vegetations noted on TTE. Urine culture 6/10/23 without any growth. ID consulted.   * Blood cultures from 6/10, 6/11, 6/12, 6/13, and 6/16 with no growth to date  * Antibiotics: Vancomycin 6/12-6/20; s/p linezolid 6/10-6/12. S/p empiric zosyn 6/10-6/12     Prophylaxis: Mechanical DVT ppx (heparin as above), fall, GI (PPI BID), fungal (micafungin x 14 days), CMV (valganciclovir, CMV IgG Ab +), PJP (bactrim)     Disposition: Ongoing SICU status given plan for CRRT (vs iHD) given hypotension    GEORGE/Fellow/Resident Provider: Lizbeth Greer NP     Faculty: Dr. Charleen Garcia  _________________________________________________________________    Interval History:   Overnight events: No acute events overnight, no complaints today.  "    ROS:   A 10-point review of systems was negative except as noted above.    Meds:    [START ON 6/19/2023] aspirin  325 mg Oral or Feeding Tube Daily     atorvastatin  20 mg Oral or Feeding Tube QPM     bumetanide  2 mg Intravenous BID     [Held by provider] calcium acetate (phos binder)  667 mg Oral or Feeding Tube Q6H     diclofenac  4 g Topical TID     heparin  3 mL Intracatheter Q24H     insulin aspart  1-12 Units Subcutaneous Q4H     methocarbamol  500 mg Oral or Feeding Tube Q6H     metoprolol tartrate  12.5 mg Oral or Feeding Tube Q8H AKRAN     micafungin  100 mg Intravenous Q24H     midodrine  20 mg Oral or Feeding Tube Q8H     [START ON 6/19/2023] multivitamin RENAL  1 tablet Oral or Feeding Tube Daily     mycophenolate  750 mg Oral BID IS     pantoprazole  40 mg Per Feeding Tube QAM AC     predniSONE  10 mg Per Feeding Tube Daily     protein modular  1 packet Per Feeding Tube TID     sodium bicarbonate  650 mg Oral or Feeding Tube TID     sodium chloride (PF)  10-40 mL Intracatheter Q7 Days     sodium chloride (PF)  3 mL Intravenous Q8H     sulfamethoxazole-trimethoprim  1 tablet Oral or Feeding Tube Once per day on Mon Wed Fri     [Held by provider] tacrolimus  1.5 mg ORAL OR NJ TUBE BID IS     ursodiol  250 mg Oral or Feeding Tube BID     valGANciclovir  450 mg Oral Daily    Or     valGANciclovir  450 mg Oral or NG Tube Daily     vancomycin  1,750 mg (central catheter) Intravenous Q24H       Physical Exam:     Admit Weight: 102.3 kg (225 lb 8 oz)    Current vitals:   BP (!) 83/69   Pulse 101   Temp 97.4  F (36.3  C) (Axillary)   Resp (!) 41   Ht 1.702 m (5' 7\")   Wt 106.7 kg (235 lb 3.7 oz)   SpO2 100%   BMI 36.84 kg/m      Vital sign ranges:    Temp:  [97.4  F (36.3  C)-98.6  F (37  C)] 97.4  F (36.3  C)  Pulse:  [] 101  Resp:  [10-41] 41  MAP:  [56 mmHg-130 mmHg] 130 mmHg  Arterial Line BP: ()/(45-64) 150/57  SpO2:  [86 %-100 %] 100 %    General Appearance: sitting upright in a " chair, conversational  Skin: Warm, perfused  Heart: irregular rhythm, distant heart sounds.  Lungs: coarse lung sounds bilaterally  Abdomen: The abdomen is obese, non-distended, diffuse incisional sensitivity; incision is clean dry and intact.  : stauffer is present.  Urine is dark brown  Extremities: edema, arms weeping. able to move all four extremities independently  Neurologic: A&Ox4. Tremor is absent.    Data:   CMP  Recent Labs   Lab 06/18/23  1226 06/18/23  1223 06/18/23  0749 06/18/23  0342 06/17/23 0412 06/17/23 0410   *  --   --  135*   < >  --    POTASSIUM 4.5  --   --  3.8   < >  --    CHLORIDE 101  --   --  101   < >  --    CO2 21*  --   --  21*   < >  --    * 192*   < > 108*   < >  --    BUN 20.3  --   --  21.4   < >  --    CR 1.08  --   --  1.14   < >  --    GFRESTIMATED 76  --   --  71   < >  --    NAZARIO 8.0*  --   --  8.0*   < >  --    ICAW 4.3*  --   --  4.5   < > 4.4   MAG 2.2  --   --  2.2   < > 2.1   PHOS 4.1  --   --  3.8   < >  --    ALBUMIN 2.6*  --   --  2.3*   < >  --    BILITOTAL  --   --   --  1.7*  --  1.8*   ALKPHOS  --   --   --  220*  --  279*   AST  --   --   --  51*  --  56*   ALT  --   --   --  83*  --  95*    < > = values in this interval not displayed.     CBC  Recent Labs   Lab 06/18/23  1226 06/18/23 0342   HGB 7.5* 7.4*  7.4*   WBC 15.0* 12.3*  12.3*    136*  136*

## 2023-06-18 NOTE — PROGRESS NOTES
Calorie Count  Intake recorded for: 6/17  Total Kcals: 487 Total Protein: 18g  Kcals from Hospital Food: 487   Protein: 18g  Kcals from Outside Food (average):0 Protein: 0g  # Meals Ordered from Kitchen: 3 meals ordered   # Meals Recorded: 3 recorded (First - 0% everything)      (Second - 100% vanilla ice cream, lemon lime soda, 90% pound cake, 50% mashed potatoes with gravy, 5% garlic green beans, turkey breast)      (Third - 100% 3 slices deli turkey)  # Supplements Recorded: no intake recorded

## 2023-06-18 NOTE — PHARMACY-VANCOMYCIN DOSING SERVICE
Pharmacy Vancomycin Note  Date of Service 2023  Patient's  1955   67 year old, male    Indication: Bacteremia  Day of Therapy: 8  Current vancomycin regimen:  1500 mg IV q24h  Current vancomycin monitoring method: Renal Replacement Therapy  Current vancomycin therapeutic monitoring goal: 15-20 mg/L        Current estimated CrCl = Estimated Creatinine Clearance: 74.3 mL/min (based on SCr of 1.16 mg/dL).    Creatinine for last 3 days  2023:  9:18 PM Creatinine 4.51 mg/dL  6/15/2023:  4:17 AM Creatinine 3.14 mg/dL; 11:49 AM Creatinine 2.33 mg/dL;  9:20 PM Creatinine 1.81 mg/dL  2023:  4:24 AM Creatinine 1.59 mg/dL; 11:58 AM Creatinine 1.47 mg/dL;  8:18 PM Creatinine 1.61 mg/dL  2023: 11:37 AM Creatinine 1.16 mg/dL    Recent Vancomycin Levels (past 3 days)  6/15/2023:  5:43 PM Vancomycin 15.8 ug/mL  2023:  5:56 PM Vancomycin 14.3 ug/mL    Vancomycin IV Administrations (past 72 hours)                   vancomycin (VANCOCIN) 1,500 mg in 0.9% NaCl 250 mL intermittent infusion (mg) 1,500 mg New Bag 23     1,500 mg New Bag 06/15/23 2022     1,500 mg New Bag 23                Nephrotoxins and other renal medications (From now, onward)    Start     Dose/Rate Route Frequency Ordered Stop    23  vancomycin (VANCOCIN) 1,750 mg in sodium chloride 0.9 % 250 mL intermittent infusion         1,750 mg (central catheter)  over 90 Minutes Intravenous EVERY 24 HOURS 23 1030  norepinephrine (LEVOPHED) 4 mg in sodium chloride 0.9 % 250 mL infusion CENTRAL         0.01-0.6 mcg/kg/min × 111.9 kg (Dosing Weight)  4.2-251.8 mL/hr  Intravenous CONTINUOUS 23 1003      23 1800  [Held by provider]  tacrolimus (GENERIC EQUIVALENT) suspension 1.5 mg        (Held by provider since 2023 at 1207 by Rekha Hernandez PA-C.Hold Reason: Acute Kidney Injury)    1.5 mg ORAL OR NJ TUBE 2 TIMES DAILY. 23 1032                Contrast Orders - past 72 hours (72h ago, onward)    None          Interpretation of levels and current regimen:  Vancomycin level is reflective of subtherapeutic level        Urine output:  anuric    Renal Function: CRRT      Plan:  1. Increase Dose to 1750mg IV q24 hours  2. Vancomycin monitoring method: Renal Replacement Therapy  3. Vancomycin therapeutic monitoring goal: 15-20 mg/L  4. Pharmacy will check vancomycin levels as appropriate in 1-3 Days.  5. Serum creatinine levels will be ordered daily for the first week of therapy and at least twice weekly for subsequent weeks.    Dami Schwarz, PharmD, BCPS

## 2023-06-19 ENCOUNTER — APPOINTMENT (OUTPATIENT)
Dept: OCCUPATIONAL THERAPY | Facility: CLINIC | Age: 68
End: 2023-06-19
Attending: INTERNAL MEDICINE
Payer: COMMERCIAL

## 2023-06-19 ENCOUNTER — APPOINTMENT (OUTPATIENT)
Dept: PHYSICAL THERAPY | Facility: CLINIC | Age: 68
End: 2023-06-19
Attending: INTERNAL MEDICINE
Payer: COMMERCIAL

## 2023-06-19 DIAGNOSIS — B99.8 OTHER INFECTIOUS DISEASE: ICD-10-CM

## 2023-06-19 DIAGNOSIS — F10.99 ALCOHOL USE, UNSPECIFIED WITH UNSPECIFIED ALCOHOL-INDUCED DISORDER (H): ICD-10-CM

## 2023-06-19 DIAGNOSIS — Z94.4 LIVER REPLACED BY TRANSPLANT (H): Primary | ICD-10-CM

## 2023-06-19 LAB
ACTH PLAS-MCNC: <10 PG/ML
ALBUMIN SERPL BCG-MCNC: 2.6 G/DL (ref 3.5–5.2)
ALP SERPL-CCNC: 240 U/L (ref 40–129)
ALT SERPL W P-5'-P-CCNC: 82 U/L (ref 0–70)
ANION GAP SERPL CALCULATED.3IONS-SCNC: 12 MMOL/L (ref 7–15)
AST SERPL W P-5'-P-CCNC: 58 U/L (ref 0–45)
BASOPHILS # BLD MANUAL: 0 10E3/UL (ref 0–0.2)
BASOPHILS NFR BLD MANUAL: 0 %
BILIRUB DIRECT SERPL-MCNC: 1.26 MG/DL (ref 0–0.3)
BILIRUB SERPL-MCNC: 1.6 MG/DL
BUN SERPL-MCNC: 21.5 MG/DL (ref 8–23)
CA-I BLD-MCNC: 4.4 MG/DL (ref 4.4–5.2)
CALCIUM SERPL-MCNC: 8.3 MG/DL (ref 8.8–10.2)
CHLORIDE SERPL-SCNC: 102 MMOL/L (ref 98–107)
CREAT SERPL-MCNC: 1.07 MG/DL (ref 0.67–1.17)
DEPRECATED HCO3 PLAS-SCNC: 22 MMOL/L (ref 22–29)
EOSINOPHIL # BLD MANUAL: 0 10E3/UL (ref 0–0.7)
EOSINOPHIL NFR BLD MANUAL: 0 %
ERYTHROCYTE [DISTWIDTH] IN BLOOD BY AUTOMATED COUNT: 19.2 % (ref 10–15)
ERYTHROCYTE [DISTWIDTH] IN BLOOD BY AUTOMATED COUNT: 19.2 % (ref 10–15)
FIBRINOGEN PPP-MCNC: 946 MG/DL (ref 170–490)
GFR SERPL CREATININE-BSD FRML MDRD: 77 ML/MIN/1.73M2
GLUCOSE BLDC GLUCOMTR-MCNC: 129 MG/DL (ref 70–99)
GLUCOSE BLDC GLUCOMTR-MCNC: 155 MG/DL (ref 70–99)
GLUCOSE BLDC GLUCOMTR-MCNC: 163 MG/DL (ref 70–99)
GLUCOSE BLDC GLUCOMTR-MCNC: 183 MG/DL (ref 70–99)
GLUCOSE BLDC GLUCOMTR-MCNC: 210 MG/DL (ref 70–99)
GLUCOSE BLDC GLUCOMTR-MCNC: 214 MG/DL (ref 70–99)
GLUCOSE BLDC GLUCOMTR-MCNC: 242 MG/DL (ref 70–99)
GLUCOSE SERPL-MCNC: 161 MG/DL (ref 70–99)
HCT VFR BLD AUTO: 23 % (ref 40–53)
HCT VFR BLD AUTO: 23 % (ref 40–53)
HGB BLD-MCNC: 7.1 G/DL (ref 13.3–17.7)
HGB BLD-MCNC: 7.1 G/DL (ref 13.3–17.7)
INR PPP: 1.09 (ref 0.85–1.15)
LYMPHOCYTES # BLD MANUAL: 0.2 10E3/UL (ref 0.8–5.3)
LYMPHOCYTES NFR BLD MANUAL: 2 %
MAGNESIUM SERPL-MCNC: 2.1 MG/DL (ref 1.7–2.3)
MCH RBC QN AUTO: 30.2 PG (ref 26.5–33)
MCH RBC QN AUTO: 30.2 PG (ref 26.5–33)
MCHC RBC AUTO-ENTMCNC: 30.9 G/DL (ref 31.5–36.5)
MCHC RBC AUTO-ENTMCNC: 30.9 G/DL (ref 31.5–36.5)
MCV RBC AUTO: 98 FL (ref 78–100)
MCV RBC AUTO: 98 FL (ref 78–100)
MONOCYTES # BLD MANUAL: 0.3 10E3/UL (ref 0–1.3)
MONOCYTES NFR BLD MANUAL: 3 %
MYELOCYTES # BLD MANUAL: 0.2 10E3/UL
MYELOCYTES NFR BLD MANUAL: 2 %
NEUTROPHILS # BLD MANUAL: 9.8 10E3/UL (ref 1.6–8.3)
NEUTROPHILS NFR BLD MANUAL: 93 %
NRBC # BLD AUTO: 0.1 10E3/UL
NRBC BLD MANUAL-RTO: 1 %
PHOSPHATE SERPL-MCNC: 3.7 MG/DL (ref 2.5–4.5)
PLAT MORPH BLD: ABNORMAL
PLATELET # BLD AUTO: 149 10E3/UL (ref 150–450)
PLATELET # BLD AUTO: 149 10E3/UL (ref 150–450)
POTASSIUM SERPL-SCNC: 3.8 MMOL/L (ref 3.4–5.3)
PROT SERPL-MCNC: 5.1 G/DL (ref 6.4–8.3)
RBC # BLD AUTO: 2.35 10E6/UL (ref 4.4–5.9)
RBC # BLD AUTO: 2.35 10E6/UL (ref 4.4–5.9)
RBC MORPH BLD: ABNORMAL
SODIUM SERPL-SCNC: 136 MMOL/L (ref 136–145)
TACROLIMUS BLD-MCNC: 3.9 UG/L (ref 5–15)
TME LAST DOSE: ABNORMAL H
TME LAST DOSE: ABNORMAL H
UFH PPP CHRO-ACNC: 0.22 IU/ML
UFH PPP CHRO-ACNC: 0.25 IU/ML
UFH PPP CHRO-ACNC: 0.29 IU/ML
VANCOMYCIN SERPL-MCNC: 18.9 UG/ML
WBC # BLD AUTO: 10.5 10E3/UL (ref 4–11)
WBC # BLD AUTO: 10.5 10E3/UL (ref 4–11)

## 2023-06-19 PROCEDURE — 84100 ASSAY OF PHOSPHORUS: CPT | Performed by: INTERNAL MEDICINE

## 2023-06-19 PROCEDURE — 02HV33Z INSERTION OF INFUSION DEVICE INTO SUPERIOR VENA CAVA, PERCUTANEOUS APPROACH: ICD-10-PCS | Performed by: RADIOLOGY

## 2023-06-19 PROCEDURE — 5A1D70Z PERFORMANCE OF URINARY FILTRATION, INTERMITTENT, LESS THAN 6 HOURS PER DAY: ICD-10-PCS | Performed by: INTERNAL MEDICINE

## 2023-06-19 PROCEDURE — 84155 ASSAY OF PROTEIN SERUM: CPT | Performed by: SURGERY

## 2023-06-19 PROCEDURE — 80202 ASSAY OF VANCOMYCIN: CPT | Performed by: TRANSPLANT SURGERY

## 2023-06-19 PROCEDURE — 99233 SBSQ HOSP IP/OBS HIGH 50: CPT | Mod: 24 | Performed by: SURGERY

## 2023-06-19 PROCEDURE — 94799 UNLISTED PULMONARY SVC/PX: CPT

## 2023-06-19 PROCEDURE — 250N000011 HC RX IP 250 OP 636: Performed by: STUDENT IN AN ORGANIZED HEALTH CARE EDUCATION/TRAINING PROGRAM

## 2023-06-19 PROCEDURE — 97530 THERAPEUTIC ACTIVITIES: CPT | Mod: GO

## 2023-06-19 PROCEDURE — 250N000013 HC RX MED GY IP 250 OP 250 PS 637: Performed by: TRANSPLANT SURGERY

## 2023-06-19 PROCEDURE — 250N000009 HC RX 250: Performed by: INTERNAL MEDICINE

## 2023-06-19 PROCEDURE — 250N000013 HC RX MED GY IP 250 OP 250 PS 637: Performed by: STUDENT IN AN ORGANIZED HEALTH CARE EDUCATION/TRAINING PROGRAM

## 2023-06-19 PROCEDURE — 82248 BILIRUBIN DIRECT: CPT | Performed by: SURGERY

## 2023-06-19 PROCEDURE — 258N000003 HC RX IP 258 OP 636: Performed by: TRANSPLANT SURGERY

## 2023-06-19 PROCEDURE — 258N000003 HC RX IP 258 OP 636: Performed by: SURGERY

## 2023-06-19 PROCEDURE — 82330 ASSAY OF CALCIUM: CPT | Performed by: SURGERY

## 2023-06-19 PROCEDURE — 85007 BL SMEAR W/DIFF WBC COUNT: CPT | Performed by: SURGERY

## 2023-06-19 PROCEDURE — 99233 SBSQ HOSP IP/OBS HIGH 50: CPT | Mod: GC | Performed by: INTERNAL MEDICINE

## 2023-06-19 PROCEDURE — 250N000011 HC RX IP 250 OP 636: Performed by: INTERNAL MEDICINE

## 2023-06-19 PROCEDURE — 97530 THERAPEUTIC ACTIVITIES: CPT | Mod: GP

## 2023-06-19 PROCEDURE — 84075 ASSAY ALKALINE PHOSPHATASE: CPT | Performed by: SURGERY

## 2023-06-19 PROCEDURE — 250N000012 HC RX MED GY IP 250 OP 636 PS 637: Performed by: PHYSICIAN ASSISTANT

## 2023-06-19 PROCEDURE — 85520 HEPARIN ASSAY: CPT | Performed by: TRANSPLANT SURGERY

## 2023-06-19 PROCEDURE — 200N000002 HC R&B ICU UMMC

## 2023-06-19 PROCEDURE — 97110 THERAPEUTIC EXERCISES: CPT | Mod: GO

## 2023-06-19 PROCEDURE — 250N000011 HC RX IP 250 OP 636: Performed by: TRANSPLANT SURGERY

## 2023-06-19 PROCEDURE — 250N000013 HC RX MED GY IP 250 OP 250 PS 637: Performed by: NURSE PRACTITIONER

## 2023-06-19 PROCEDURE — 250N000013 HC RX MED GY IP 250 OP 250 PS 637: Performed by: PHYSICIAN ASSISTANT

## 2023-06-19 PROCEDURE — 83735 ASSAY OF MAGNESIUM: CPT | Performed by: INTERNAL MEDICINE

## 2023-06-19 PROCEDURE — 250N000011 HC RX IP 250 OP 636: Performed by: SURGERY

## 2023-06-19 PROCEDURE — 250N000012 HC RX MED GY IP 250 OP 636 PS 637: Performed by: INTERNAL MEDICINE

## 2023-06-19 PROCEDURE — 85610 PROTHROMBIN TIME: CPT | Performed by: SURGERY

## 2023-06-19 PROCEDURE — 97110 THERAPEUTIC EXERCISES: CPT | Mod: GP

## 2023-06-19 PROCEDURE — P9045 ALBUMIN (HUMAN), 5%, 250 ML: HCPCS | Performed by: INTERNAL MEDICINE

## 2023-06-19 PROCEDURE — 85384 FIBRINOGEN ACTIVITY: CPT | Performed by: PHYSICIAN ASSISTANT

## 2023-06-19 PROCEDURE — 80197 ASSAY OF TACROLIMUS: CPT | Performed by: PHYSICIAN ASSISTANT

## 2023-06-19 PROCEDURE — 97535 SELF CARE MNGMENT TRAINING: CPT | Mod: GO

## 2023-06-19 PROCEDURE — 82247 BILIRUBIN TOTAL: CPT | Performed by: SURGERY

## 2023-06-19 PROCEDURE — 90937 HEMODIALYSIS REPEATED EVAL: CPT

## 2023-06-19 PROCEDURE — 250N000011 HC RX IP 250 OP 636

## 2023-06-19 PROCEDURE — 258N000003 HC RX IP 258 OP 636: Performed by: INTERNAL MEDICINE

## 2023-06-19 PROCEDURE — 85027 COMPLETE CBC AUTOMATED: CPT | Performed by: INTERNAL MEDICINE

## 2023-06-19 RX ORDER — VALGANCICLOVIR 450 MG/1
450 TABLET, FILM COATED ORAL
Status: DISCONTINUED | OUTPATIENT
Start: 2023-06-19 | End: 2023-06-25

## 2023-06-19 RX ORDER — ONDANSETRON 2 MG/ML
4 INJECTION INTRAMUSCULAR; INTRAVENOUS
Status: DISCONTINUED | OUTPATIENT
Start: 2023-06-19 | End: 2023-06-22

## 2023-06-19 RX ORDER — BUMETANIDE 0.25 MG/ML
2 INJECTION INTRAMUSCULAR; INTRAVENOUS
Status: DISCONTINUED | OUTPATIENT
Start: 2023-06-19 | End: 2023-06-22

## 2023-06-19 RX ORDER — VALGANCICLOVIR HYDROCHLORIDE 50 MG/ML
450 POWDER, FOR SOLUTION ORAL
Status: DISCONTINUED | OUTPATIENT
Start: 2023-06-19 | End: 2023-06-23

## 2023-06-19 RX ORDER — ALBUMIN (HUMAN) 12.5 G/50ML
50 SOLUTION INTRAVENOUS
Status: DISCONTINUED | OUTPATIENT
Start: 2023-06-19 | End: 2023-06-19

## 2023-06-19 RX ORDER — ONDANSETRON 4 MG/1
4 TABLET, ORALLY DISINTEGRATING ORAL
Status: DISCONTINUED | OUTPATIENT
Start: 2023-06-19 | End: 2023-06-22

## 2023-06-19 RX ADMIN — URSODIOL 250 MG: 250 TABLET, FILM COATED ORAL at 08:00

## 2023-06-19 RX ADMIN — VANCOMYCIN HYDROCHLORIDE 750 MG: 1 INJECTION, POWDER, LYOPHILIZED, FOR SOLUTION INTRAVENOUS at 21:40

## 2023-06-19 RX ADMIN — MIDODRINE HYDROCHLORIDE 20 MG: 5 TABLET ORAL at 06:16

## 2023-06-19 RX ADMIN — CALCIUM CHLORIDE, MAGNESIUM CHLORIDE, SODIUM CHLORIDE, SODIUM BICARBONATE, POTASSIUM CHLORIDE AND SODIUM PHOSPHATE DIBASIC DIHYDRATE 12.5 ML/KG/HR: 3.68; 3.05; 6.34; 3.09; .314; .187 INJECTION INTRAVENOUS at 06:35

## 2023-06-19 RX ADMIN — MICAFUNGIN SODIUM 100 MG: 50 INJECTION, POWDER, LYOPHILIZED, FOR SOLUTION INTRAVENOUS at 00:08

## 2023-06-19 RX ADMIN — CALCIUM CHLORIDE, MAGNESIUM CHLORIDE, SODIUM CHLORIDE, SODIUM BICARBONATE, POTASSIUM CHLORIDE AND SODIUM PHOSPHATE DIBASIC DIHYDRATE 12.5 ML/KG/HR: 3.68; 3.05; 6.34; 3.09; .314; .187 INJECTION INTRAVENOUS at 03:05

## 2023-06-19 RX ADMIN — HEPARIN SODIUM 1300 UNITS: 1000 INJECTION INTRAVENOUS; SUBCUTANEOUS at 17:56

## 2023-06-19 RX ADMIN — SULFAMETHOXAZOLE AND TRIMETHOPRIM 1 TABLET: 400; 80 TABLET ORAL at 20:06

## 2023-06-19 RX ADMIN — HEPARIN SODIUM 1300 UNITS: 1000 INJECTION INTRAVENOUS; SUBCUTANEOUS at 17:57

## 2023-06-19 RX ADMIN — Medication: at 16:14

## 2023-06-19 RX ADMIN — SODIUM BICARBONATE 650 MG: 650 TABLET ORAL at 08:00

## 2023-06-19 RX ADMIN — ONDANSETRON 4 MG: 4 TABLET, ORALLY DISINTEGRATING ORAL at 09:07

## 2023-06-19 RX ADMIN — URSODIOL 250 MG: 250 TABLET, FILM COATED ORAL at 19:54

## 2023-06-19 RX ADMIN — B-COMPLEX W/ C & FOLIC ACID TAB 1 MG 1 TABLET: 1 TAB at 08:00

## 2023-06-19 RX ADMIN — PREDNISONE 10 MG: 10 TABLET ORAL at 08:00

## 2023-06-19 RX ADMIN — METHOCARBAMOL 500 MG: 500 TABLET ORAL at 14:24

## 2023-06-19 RX ADMIN — Medication 12.5 MG: at 14:24

## 2023-06-19 RX ADMIN — DICLOFENAC SODIUM 4 G: 10 GEL TOPICAL at 20:16

## 2023-06-19 RX ADMIN — METHOCARBAMOL 500 MG: 500 TABLET ORAL at 07:59

## 2023-06-19 RX ADMIN — CALCIUM CHLORIDE, MAGNESIUM CHLORIDE, SODIUM CHLORIDE, SODIUM BICARBONATE, POTASSIUM CHLORIDE AND SODIUM PHOSPHATE DIBASIC DIHYDRATE 12.5 ML/KG/HR: 3.68; 3.05; 6.34; 3.09; .314; .187 INJECTION INTRAVENOUS at 03:06

## 2023-06-19 RX ADMIN — METHOCARBAMOL 500 MG: 500 TABLET ORAL at 19:54

## 2023-06-19 RX ADMIN — VALGANCICLOVIR HYDROCHLORIDE 450 MG: 50 POWDER, FOR SOLUTION ORAL at 20:00

## 2023-06-19 RX ADMIN — HEPARIN SODIUM 1950 UNITS/HR: 10000 INJECTION, SOLUTION INTRAVENOUS at 05:13

## 2023-06-19 RX ADMIN — MYCOPHENOLATE MOFETIL 750 MG: 200 POWDER, FOR SUSPENSION ORAL at 08:00

## 2023-06-19 RX ADMIN — Medication 12.5 MG: at 21:42

## 2023-06-19 RX ADMIN — OXYCODONE HYDROCHLORIDE 2.5 MG: 5 TABLET ORAL at 23:22

## 2023-06-19 RX ADMIN — PROCHLORPERAZINE EDISYLATE 5 MG: 5 INJECTION INTRAMUSCULAR; INTRAVENOUS at 19:45

## 2023-06-19 RX ADMIN — BUMETANIDE 2 MG: 0.25 INJECTION INTRAMUSCULAR; INTRAVENOUS at 17:18

## 2023-06-19 RX ADMIN — ASPIRIN 325 MG: 325 TABLET ORAL at 08:00

## 2023-06-19 RX ADMIN — BUMETANIDE 2 MG: 0.25 INJECTION INTRAMUSCULAR; INTRAVENOUS at 12:05

## 2023-06-19 RX ADMIN — TACROLIMUS 1 MG: 5 CAPSULE ORAL at 18:26

## 2023-06-19 RX ADMIN — HEPARIN SODIUM 2100 UNITS/HR: 10000 INJECTION, SOLUTION INTRAVENOUS at 17:19

## 2023-06-19 RX ADMIN — ONDANSETRON 4 MG: 4 TABLET, ORALLY DISINTEGRATING ORAL at 18:24

## 2023-06-19 RX ADMIN — DICLOFENAC SODIUM 4 G: 10 GEL TOPICAL at 14:25

## 2023-06-19 RX ADMIN — MIDODRINE HYDROCHLORIDE 20 MG: 5 TABLET ORAL at 21:42

## 2023-06-19 RX ADMIN — MIDODRINE HYDROCHLORIDE 20 MG: 5 TABLET ORAL at 14:24

## 2023-06-19 RX ADMIN — ALBUMIN HUMAN 250 ML: 0.05 INJECTION, SOLUTION INTRAVENOUS at 16:01

## 2023-06-19 RX ADMIN — SODIUM CHLORIDE 300 ML: 9 INJECTION, SOLUTION INTRAVENOUS at 16:01

## 2023-06-19 RX ADMIN — SODIUM BICARBONATE 650 MG: 650 TABLET ORAL at 19:54

## 2023-06-19 RX ADMIN — ONDANSETRON 4 MG: 4 TABLET, ORALLY DISINTEGRATING ORAL at 12:17

## 2023-06-19 RX ADMIN — DICLOFENAC SODIUM 4 G: 10 GEL TOPICAL at 08:06

## 2023-06-19 RX ADMIN — Medication 40 MG: at 08:01

## 2023-06-19 RX ADMIN — MYCOPHENOLATE MOFETIL 750 MG: 200 POWDER, FOR SUSPENSION ORAL at 18:26

## 2023-06-19 RX ADMIN — MICAFUNGIN SODIUM 100 MG: 50 INJECTION, POWDER, LYOPHILIZED, FOR SOLUTION INTRAVENOUS at 23:31

## 2023-06-19 RX ADMIN — Medication 12.5 MG: at 06:16

## 2023-06-19 RX ADMIN — SODIUM BICARBONATE 650 MG: 650 TABLET ORAL at 14:24

## 2023-06-19 RX ADMIN — ATORVASTATIN CALCIUM 20 MG: 20 TABLET, FILM COATED ORAL at 19:54

## 2023-06-19 ASSESSMENT — ACTIVITIES OF DAILY LIVING (ADL)
ADLS_ACUITY_SCORE: 36
ADLS_ACUITY_SCORE: 36
ADLS_ACUITY_SCORE: 34
ADLS_ACUITY_SCORE: 34
ADLS_ACUITY_SCORE: 36
ADLS_ACUITY_SCORE: 34
ADLS_ACUITY_SCORE: 36
ADLS_ACUITY_SCORE: 34

## 2023-06-19 NOTE — CONSULTS
"    Interventional Radiology  [facility] Consult Service Note  06/19/23   10:43 AM    Consult Requested: \"Renal biopsy on 6/20/2023\" requested by GI    Recommendations/Plan:    -Patient is on IR schedule tomorrow for a image-guided RLQ random transplant kidney biopsy.   -Labs WNL for procedure INR 1.09, Plts 149.  -Orders entered for procedure. NPO status placed & timed to begin at midnight. No pre procedure IV antibiotics required.   -Medications to be held include: IV heparin (will be timed by IR to hold 4-6 hrs prior to procedure). No hold needed for ASA.  -Consent will be done prior to procedure.     Please contact the IR charge RN at [phone] for estimated time of procedure.     Case and imaging discussed with IR attending, Dr. Welch. Recommendations were reviewed with Lolis Bermudez MD.     This is a 67 year old male with past medical history of decompensated alcohol & hemochromatosis cirrhosis complicated by variceal bleeding s/p TIPS (10/1/2022) and hepatic encephalopathy & CKD (IgA nephropathy + ANCA vasculitis) s/p simultaneous liver kidney transplant on 6/6/2023. GI has requested IR to perform a RLQ random transplant kidney biopsy due to concerns for low flow in the renal graft.     Diagnostic labs entered by: requesting team    Pertinent Imaging Reviewed: US-imaging from 6/14/23    Expected date of discharge:  TBD    Vitals:   BP (!) 83/69   Pulse 99   Temp 98.8  F (37.1  C) (Axillary)   Resp 11   Ht 1.702 m (5' 7\")   Wt 101.8 kg (224 lb 6.9 oz)   SpO2 100%   BMI 35.15 kg/m      Pertinent Labs:   Lab Results   Component Value Date    WBC 10.5 06/19/2023    WBC 10.5 06/19/2023    WBC 12.3 (H) 06/18/2023     Lab Results   Component Value Date    HGB 7.1 06/19/2023    HGB 7.1 06/19/2023    HGB 7.4 06/18/2023     Lab Results   Component Value Date     06/19/2023     06/19/2023     06/18/2023     Lab Results   Component Value Date    INR 1.09 " 06/19/2023    PTT 46 (H) 06/18/2023     Lab Results   Component Value Date    POTASSIUM 3.8 06/19/2023    POTASSIUM 3.7 06/07/2023    POTASSIUM 4.6 08/27/2019        COVID-19 Antibody Results, Testing for Immunity         No data to display            COVID-19 PCR Results        12/16/2022    20:25 1/15/2023    15:05 1/23/2023    06:09 2/13/2023    07:43 4/7/2023    16:30   COVID-19 PCR Results   SARS CoV2 PCR Negative   Negative   Negative   Negative   Negative             6/5/2023    14:09   COVID-19 PCR Results   SARS CoV2 PCR Negative         Juliet Way PA-C  Interventional Radiology  Pager: [phone]

## 2023-06-19 NOTE — PROGRESS NOTES
[facility]   Transplant Nephrology Progress Note  Date of Admission:  6/5/2023  Today's Date: 06/19/2023    Recommendations:  - recommend to restart tacrolimus   - will obtain transplant kidney biopsy tomorrow due to ongoing DGF. Need to stop heparin gtt prior to biopsy  - will stop CRRT and transition to iHD with systolic BP of 85mmHg acceptable. Albumin prime  - UF goal, 2-3L with flow rate of 250-350ml/h, keep systolic greater than 85mmHg    -Start allopurinol 100mg daily  -Ok to give 5 day prednisone burst for gout flare  -Bumex 2mg IV BID  -Depending on UOP trend will need to consider tunneling dialysis line    Assessment & Plan   # DDKT (K): DGF and has remained on CRRT since transplant.  Still oliguric. 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  Slow trend down in LFTs.  Followed by Transplant Surgery.    # Immunosuppression: Tacrolimus immediate release (goal 5-8), Mycophenolate mofetil (dose 750 mg every 12 hours) and Prednisone (dose 10 mg daily)    - Induction with Recent Transplant:  rATG total 4mg/kg, stopped due to sepsis   - Changes: Yes - we will restart tacrolimus today, 6/19, ok to give 5 day prednisone burst for gout flare    # Infection Prophylaxis:   - PJP: Sulfa/TMP (Bactrim)  - CMV: Valganciclovir (Valcyte);CMV IgG Ab positive  - Thrush: Micafungin (Mycamine)  - Fungal: Micafungin (Mycamine)    # Hypotension, improving: Hypotensive, now off pressors Goal BP: MAP >  65   - Volume status: Moderate hypervolemia  EDW ~ 96.4 kg (on HD)   - Changes: Yes - will try to transition to iHD with improving BP. Systolic 85mmHg during HD will be acceptable.     -On metoprolol 12.5mg q8h for afib and midodrine 20mg q8h for hypotension   -Give bumex 2mg IV BID    # Elevated Blood Glucose: Glucose generally running ~ 140-180s'   - On insulin prn.   - Management as per primary team.    # Anemia in Chronic Renal Disease/Surgery: Hgb: Stable, low      FEDERICO: No   - Iron studies: Replete    - Transfusion for Hgb <7 mg/dl, management per primary team    # Mineral Bone Disorder:   - Secondary renal hyperparathyroidism; PTH level: Minimally elevated ( pg/ml)        On treatment: None  - Vitamin D; level: Normal        On supplement: No  - Calcium; level: Normal       On supplement: No  - Phosphorus; level: Normal     On binder: No    # Electrolytes:   - Potassium; level: Normal        On supplement: No  - Magnesium; level: Normal        On supplement: No  - Bicarbonate; level: Stable low        On supplement: Yes  - Sodium; level: Normal    # Paroxysmal A-Fib: Stable heart rate on beta blocker and anticoagulation with IV heparin.    # History of C.diff: Continues with loose stools, but negative C. diff with last check 6/11.    # E. Faecium Bacteremia: Diagnosed on 6/9 BlCx. Treating with vancomycin thru 6/20    # ANCA Vasculitis: S/p Rituximab x2   - Will be on acute GN protocol post transplant.    # Gout: On prednisone 10 mg PO daily PTA. Currently on prednisone 10 with plan to eventually wean to prednisone 5 once hemoydynamically stable, other immunosuppression dose/trough at goal.   -Start allopurinol 100mg daily   -Ok to give 5d prednisone pulse    # Transplant History:  Etiology of Kidney Failure: IgA nephropathy and ANCA vasculitis  Tx: Liver Tx (SLK)  Transplant: 6/6/2023 (Liver), 6/6/2023 (Kidney)  Significant changes in immunosuppression: None  Significant transplant-related  complications: None    Recommendations were communicated to the primary team verbally     Eran Cisse MD   Visiting Resident Nephrology PGY II   06/19/23 1:12 PM      Physician Attestation   I, Subhash Dutta MD, personally examined and evaluated this patient.  I discussed the patient with the resident/fellow and care team, and agree with the assessment and plan of care as documented in the note on 06/19/23 .      I personally reviewed vital signs, medications, labs, and imaging.    Key findings: Oliguric but making urine. Trial iHD today, SBP>85, albumin prime, 2-3L UF. Continue bumex 2mg IV BID, monitor UOP, recommend kidney biopsy tmrw. Restart allopurinol 100mg daily, ok to give prednisone burst x5 days for gout flare.   Subhash Dutta MD  Date of Service (when I saw the patient): 06/19/23          Interval History   Mr. Miranda creatinine is 1.07 (06/19); Stable on CRRT.  Minimal urine output and remains oliguric.  Other labs are all improving. 2 weeks post op from transplant and still RRT dependent. BP have been more stable and at an acceptable range for iHD. He is feeling weak. Otherwise, no acute events ON.       Review of Systems   4 point ROS was obtained and negative except as noted in the Interval History.    MEDICATIONS:    sodium chloride 0.9%  300 mL Hemodialysis Machine Once     albumin human  250 mL Intravenous Once in dialysis/CRRT     aspirin  325 mg Oral or Feeding Tube Daily     atorvastatin  20 mg Oral or Feeding Tube QPM     bumetanide  2 mg Intravenous BID     [Held by provider] calcium acetate (phos binder)  667 mg Oral or Feeding Tube Q6H     diclofenac  4 g Topical TID     heparin  3 mL Intracatheter Q24H     sodium chloride (PF) 0.9%  10 mL Intracatheter Once in dialysis/CRRT    Followed by     heparin  1.3-2.6 mL Intracatheter Once in dialysis/CRRT     sodium chloride (PF) 0.9%  10 mL Intracatheter Once in dialysis/CRRT    Followed by     heparin  1.3-2.6 mL Intracatheter Once in  "dialysis/CRRT     insulin aspart  1-12 Units Subcutaneous Q4H     methocarbamol  500 mg Oral or Feeding Tube Q6H     metoprolol tartrate  12.5 mg Oral or Feeding Tube Q8H KARAN     micafungin  100 mg Intravenous Q24H     midodrine  20 mg Oral or Feeding Tube Q8H     multivitamin RENAL  1 tablet Oral or Feeding Tube Daily     mycophenolate  750 mg Oral BID IS     - MEDICATION INSTRUCTIONS -   Does not apply Once     ondansetron  4 mg Oral TID AC    Or     ondansetron  4 mg Intravenous TID AC     pantoprazole  40 mg Per Feeding Tube QAM AC     predniSONE  10 mg Per Feeding Tube Daily     protein modular  1 packet Per Feeding Tube TID     sodium bicarbonate  650 mg Oral or Feeding Tube TID     sodium chloride (PF)  10-40 mL Intracatheter Q7 Days     sodium chloride (PF)  3 mL Intravenous Q8H     sulfamethoxazole-trimethoprim  1 tablet Oral or Feeding Tube Once per day on      tacrolimus  1 mg ORAL OR NJ TUBE BID IS     ursodiol  250 mg Oral or Feeding Tube BID     valGANciclovir  450 mg Oral Once per day on     Or     valGANciclovir  450 mg Oral or NG Tube Once per day on      vancomycin place issa - receiving intermittent dosing  1 each Intravenous See Admin Instructions       dextrose       dextrose       heparin 2,100 Units/hr (23 0700)       Physical Exam   Temp  Av.9  F (36.6  C)  Min: 97  F (36.1  C)  Max: 99.2  F (37.3  C)  Arterial Line BP  Min: 60/45  Max: 131/80  Arterial Line MAP (mmHg)  Av mmHg  Min: 52 mmHg  Max: 101 mmHg      Pulse  Av.5  Min: 68  Max: 159 Resp  Av.8  Min: 14  Max: 23  FiO2 (%)  Av.3 %  Min: 40 %  Max: 50 %  SpO2  Av.1 %  Min: 92 %  Max: 100 %    CVP (mmHg): 6 mmHgBP (!) 83/69   Pulse (!) 132   Temp 98  F (36.7  C) (Axillary)   Resp 18   Ht 1.702 m (5' 7\")   Wt 101.8 kg (224 lb 6.9 oz)   SpO2 100%   BMI 35.15 kg/m      Admit Weight: 102.3 kg (225 lb 8 oz)     GENERAL APPEARANCE: alert and no distress  HENT: mouth without " ulcers or lesions  RESP: lungs clear to auscultation - no rales, rhonchi or wheezes  CV: regular rhythm, normal rate, no rub, no murmur  EDEMA: trace to 1+ LE edema bilaterally  ABDOMEN: soft, nondistended, mildly tender, bowel sounds normal  MS: extremities normal - no gross deformities noted, no evidence of inflammation in joints, no muscle tenderness  SKIN: no rash  TX KIDNEY: mild TTP  DIALYSIS ACCESS:  Temporary catheter right internal jugular    Data   All labs reviewed by me.  CMP  Recent Labs   Lab 06/19/23  1213 06/19/23  0804 06/19/23  0359 06/19/23  0354 06/18/23 2003 06/18/23 2000 06/18/23  1608 06/18/23  1226 06/18/23  0749 06/18/23  0342 06/17/23  0412 06/17/23  0410 06/16/23  0428 06/16/23  0424   NA  --   --   --  136  --  135*  --  134*  --  135*   < >  --    < > 136   POTASSIUM  --   --   --  3.8  --  4.1  --  4.5  --  3.8   < >  --    < > 3.7   CHLORIDE  --   --   --  102  --  101  --  101  --  101   < >  --    < > 104   CO2  --   --   --  22  --  23  --  21*  --  21*   < >  --    < > 20*   ANIONGAP  --   --   --  12  --  11  --  12  --  13   < >  --    < > 12   * 183* 163* 161*   < > 148*   < > 237*   < > 108*   < >  --    < > 173*   BUN  --   --   --  21.5  --  20.7  --  20.3  --  21.4   < >  --    < > 37.4*   CR  --   --   --  1.07  --  1.06  --  1.08  --  1.14   < >  --    < > 1.59*   GFRESTIMATED  --   --   --  77  --  78  --  76  --  71   < >  --    < > 48*   NAZARIO  --   --   --  8.3*  --  8.2*  --  8.0*  --  8.0*   < >  --    < > 7.8*   MAG  --   --   --  2.1  --  2.2  --  2.2  --  2.2   < > 2.1   < > 2.2   PHOS  --   --   --  3.7  --  4.0  --  4.1  --  3.8   < >  --    < > 3.3   PROTTOTAL  --   --   --  5.1*  --   --   --   --   --  4.7*  --  4.5*  --  4.7*   ALBUMIN  --   --   --  2.6*  --  2.7*  --  2.6*  --  2.3*   < >  --    < > 2.1*   BILITOTAL  --   --   --  1.6*  --   --   --   --   --  1.7*  --  1.8*  --  1.6*   ALKPHOS  --   --   --  240*  --   --   --   --   --  220*  --   279*  --  261*   AST  --   --   --  58*  --   --   --   --   --  51*  --  56*  --  62*   ALT  --   --   --  82*  --   --   --   --   --  83*  --  95*  --  109*    < > = values in this interval not displayed.     CBC  Recent Labs   Lab 06/19/23  0354 06/18/23  2000 06/18/23  1512 06/18/23  1226   HGB 7.1*  7.1* 7.4* 7.9* 7.5*   WBC 10.5  10.5 12.3* 15.1* 15.0*   RBC 2.35*  2.35* 2.43* 2.60* 2.53*   HCT 23.0*  23.0* 23.2* 25.0* 24.5*   MCV 98  98 96 96 97   MCH 30.2  30.2 30.5 30.4 29.6   MCHC 30.9*  30.9* 31.9 31.6 30.6*   RDW 19.2*  19.2* 19.0* 19.3* 19.5*   *  149* 151 179 178     INR  Recent Labs   Lab 06/19/23  0354 06/18/23  1512 06/18/23  0343 06/17/23  0854 06/17/23  0410 06/16/23  0424 06/15/23  0842 06/15/23  0417   INR 1.09 1.11 1.03  --  1.14   < >  --  1.03   PTT  --  46*  --  40*  --   --  29 30    < > = values in this interval not displayed.     ABG  Recent Labs   Lab 06/15/23  1723   PH 7.44   PCO2 36   PO2 112*   HCO3 24   O2PER 2  2      Urine Studies  Recent Labs   Lab Test 06/16/23  1220 06/10/23  1613 06/05/23  1620 04/07/23  1246   COLOR Yellow Orange* Yellow Yellow   APPEARANCE Cloudy* Cloudy* Clear Clear   URINEGLC 150* 30* Negative Negative   URINEBILI Negative Negative Negative Negative   URINEKETONE Negative Negative Negative Negative   SG 1.014 1.021 1.012 1.011   UBLD Large* Large* Moderate* Moderate*   URINEPH 5.5 5.5 5.5 5.5   PROTEIN 70* 100* 10* 10*   NITRITE Negative Negative Negative Negative   LEUKEST Large* Large* Negative Negative   RBCU >182* >182* 2 5*   WBCU 56* 128* 7* 2     No lab results found.  PTH  Recent Labs   Lab Test 05/19/23  0956   PTHI 67*     Iron Studies  Recent Labs   Lab Test 11/22/22  0848   IRON 68   *   IRONSAT 31   HOLA 429*       IMAGING:  All imaging studies reviewed by me.

## 2023-06-19 NOTE — PROGRESS NOTES
Transplant Social Work Services Progress Note      Date of Initial Social Work Evaluation: 11/22/2022  Collaborated with: Liver Transplant Team, outpatient transplant coordinator Cindy Clay RN and [facility] Rehab Admissions    Data: Casey is s/p liver and kidney transplant.  Intervention: I met with patient, his wife Apoorva and their daughter Denice.  We discussed discharge disposition options (TCU/ARU vs. Home).  I contacted Dionisioemerson David ([place], [phone]) and updated this unit on patient's current medical status and potential need for outpatient dialysis.  Assessment: Casey and his wife Apoorva are in agreement with TCU/ARU if recommended.  They are in agreement with a referral to [facility] Transitional Care and Acute Rehab.  Education provided by : Transitional Care, Acute Rehabb  Plan:    Discharge Plans in Progress: Social work will follow patient's progress with therapies.  [facility] is following for TCU/ARU placement. Awaiting     Barriers to d/c plan: medical stability, an estimated discharge date is not known    Follow up Plan: I will follow for support and discharge planning.        MOUNIKA Sawant, [facility]  Liver Transplant   Phone [phone]  Pager [phone]

## 2023-06-19 NOTE — PROGRESS NOTES
"CLINICAL NUTRITION SERVICES - REASSESSMENT NOTE     Nutrition Prescription    RECOMMENDATIONS FOR MDs/PROVIDERS TO ORDER:  Recommend consistent intake of 1200 kcals / 70 g protein prior to discontinuing TF / removing FT    Malnutrition Status:    Moderate malnutrition in the context of acute illness    Recommendations already ordered by Registered Dietitian (RD):  - Continue Cycled EN as ordered to meet 2/3 nutrition needs pending calorie counts results  - Calorie count extended through 6/20  - Resume Banatrol TID (discontinued 6/16 when feeds held)  - Discharge diet instructions written    Future/Additional Recommendations:  - Monitor results of calorie counts / ability to decrease or discontinue EN  - Monitor lytes with HD/ ongoing need for renal formula  - Stool trends with resumption of banatrol TID     EVALUATION OF THE PROGRESS TOWARD GOALS   Diet: Regular, oral supplements PRN, calorie counts 6/17-6/19  Nutrition Support via NDT:  6/12: Novasource Renal @ 10 ml/hr     6/13 - 6/16: Novasource Renal (or equivalent) @ 40 ml/hr (960 ml) + prosource TF20 TID provides 2160 kcal (27 kcal/kg), 147 g pro (1.9 g/kg), 176 g CHO, 688 ml free water, and 0 g fiber daily     6/17: EN held    6/18-: cycle EN: Novasorce renal @ 40 ml/hr overnight (8pm -8am) + 3 pkts prosource TF20  provides 1200 kcal (15 kcal/kg), 104 g pro (1.3 g/kg), 88 g CHO, 344 ml free water, and 0 g fiber daily. Cycled EN providers ~ 2/3 estimated needs.    Intake:   PO:   6/17       Total Kcals: 487       Total Protein: 18g  6/18       Total Kcals: 1025     Total Protein: 44g    ENTERAL:  Average intake over past 7 days: 558 ml + 2.3 pkts prosource TF 20 provided 1300 kcals (17 kcal/kg), 97 g protein (1.2gm/kg) for ~65% estimated needs     NEW FINDINGS   Visited patient and wife.  Ongoing nausea.  Choosing various supplements from list provided by RD, however dislikes them: \"too sweet\".  Accepting of mocha flavor novasource renal brought from home (not " available on hospital formulary).  Has not tried magic cup yet.  Meat is not appealing to patient.    Weight: 102 kg, stable    Labs: K+ and Phos stable.  Will continue renal formula d/t plan to transition from CRRT to HD    Meds: IV lasix, Phoslo on hold, high resistance novolog, nephrovite, scheduled zofran, prednisone,     GI: loose stools, 4 to 6 episodes daily. pt received three doses of banatrol 6/16-17 - discontinued by provider when feeds held.    Skin: Skin beneath nasal bridle was inspected; appears appropriate, with no skin breakdown or tension on the bridle string.    MALNUTRITION  % Intake: < 75% for > 7 days (moderate)  % Weight Loss: None noted, confounded by fluid  Subcutaneous Fat Loss: None observed  Muscle Loss: None observed  Fluid Accumulation/Edema: +3 moderate  Malnutrition Diagnosis: Moderate malnutrition in the context of acute illness    Previous Goals   Total avg nutritional intake to meet a minimum of 25 kcal/kg and 1.5 g PRO/kg daily (per dosing wt 78 kg).  Evaluation: Not met    Previous Nutrition Diagnosis  Inadequate protein-energy intake related to EN interruptions with ileus as evidenced by EN meeting only 1/3 estimated kcal / protein needs over past week with minimal to no po intake  Evaluation: No longer applicable, nutrition diagnosis changed below    CURRENT NUTRITION DIAGNOSIS  Inadequate oral intake related to nausea, decline in appetite as evidenced by PO intake meeting < 50% estimated needs with reliance on supplemental cycle EN via NDT    INTERVENTIONS  Implementation  Education - provided written and verbal instruction on post transplant diet emphasizing high protein and food safety   Collaboration with providers - plan for scheduled zofran per SICU  Enteral Nutrition - Continue cycled EN    Goals  Patient to consume % of nutritionally adequate meal trays TID, or the equivalent with supplements/snacks.    Monitoring/Evaluation  Progress toward goals will be monitored  and evaluated per protocol.    Rekha Mcdonough RD, LD, CNSC  4A SICU RD pager: [phone]  Ascom: [phone]  Weekend/Holiday RD pager [phone]

## 2023-06-19 NOTE — PHARMACY-VANCOMYCIN DOSING SERVICE
Pharmacy Vancomycin Note  Date of Service 2023  Patient's  1955   67 year old, male    Indication: Bacteremia  Day of Therapy: 10  Current vancomycin regimen:  Intermittent dosing  Current vancomycin monitoring method: Trough (Method 2 = manual dose calculation)  Current vancomycin therapeutic monitoring goal: 15-20 mg/L    Current estimated CrCl = Estimated Creatinine Clearance: 78.3 mL/min (based on SCr of 1.07 mg/dL).    Creatinine for last 3 days  2023:  8:18 PM Creatinine 1.61 mg/dL  2023: 11:37 AM Creatinine 1.16 mg/dL;  7:42 PM Creatinine 1.19 mg/dL  2023:  3:42 AM Creatinine 1.14 mg/dL; 12:26 PM Creatinine 1.08 mg/dL;  8:00 PM Creatinine 1.06 mg/dL  2023:  3:54 AM Creatinine 1.07 mg/dL    Recent Vancomycin Levels (past 3 days)  2023:  5:56 PM Vancomycin 14.3 ug/mL  2023:  2:01 PM Vancomycin 18.9 ug/mL    Vancomycin IV Administrations (past 72 hours)                   vancomycin (VANCOCIN) 1,750 mg in sodium chloride 0.9 % 250 mL intermittent infusion (mg) 1,750 mg New Bag 23     1,750 mg New Bag 23    vancomycin (VANCOCIN) 1,500 mg in 0.9% NaCl 250 mL intermittent infusion (mg) 1,500 mg New Bag 23                Nephrotoxins and other renal medications (From now, onward)    Start     Dose/Rate Route Frequency Ordered Stop    23  vancomycin (VANCOCIN) 750 mg in sodium chloride 0.9 % 250 mL intermittent infusion         750 mg  over 90 Minutes Intravenous ONCE 23 18223 1800  tacrolimus (GENERIC EQUIVALENT) suspension 1 mg         1 mg ORAL OR NJ TUBE 2 TIMES DAILY. 23 1004      23 1100  bumetanide (BUMEX) injection 2 mg         2 mg Intravenous 2 TIMES DAILY (Diuretics and Nitrates) 23 1003      23 1021  vancomycin place issa - receiving intermittent dosing         1 each Intravenous SEE ADMIN INSTRUCTIONS 23 1021               Contrast Orders - past 72 hours (72h ago,  onward)    None          Interpretation of levels and current regimen:  Vancomycin level is reflective of therapeutic level, prior to HD  Renal Function: ESRD on Dialysis    Plan:  1. Continue intermittent dosing. IV vancomycin 750 mg once  2. Vancomycin monitoring method: Trough (Method 2 = manual dose calculation)  3. Vancomycin therapeutic monitoring goal: 15-20 mg/L  4. Pharmacy will check vancomycin levels as appropriate in 1-3 Days.  5. Serum creatinine levels will be ordered daily for the first week of therapy and at least twice weekly for subsequent weeks.    Joshua Vaz [facility]

## 2023-06-19 NOTE — PLAN OF CARE
Major Shift Events: 1900 - 0300  Neuro: A&Ox4, forgetful, moderate/weak strength in all ext, CMS at baseline  Cardiac: a fib, HR < 100 primarily, pulses palpable but weak, afebrile  Resp: lung sounds coarse/dim, sating > 92% 2L NC, IS at bedside   GI: gas discomfort, bowel sounds faint, LBM 6/18, loose stool   : stauffer intact with cloudy pavel urine, UO improving after bumex, CRRT 0-100 per flowsheets   Skin: scattered skin tears, bruising, & petechiae; clamshell incision EDUARDO, kidney incision leaking serous drg, umbilical bulge present   Pain/Nausea: generalized joint/back pain treated with robaxin; int nausea  LDA: R PIV infusing heparin gtt; L midline (2) infusing abx; R HD CVC; NJT with cycled TF NOC, R brachial art line  Diet: regular, betsy counts  Mobility: 2 assist lift  Labs: reviewed, max WNL, 10a therapeutic  Plan: continue POC   For vital signs and complete assessments, please see documentation flowsheets.

## 2023-06-19 NOTE — PROGRESS NOTES
SURGICAL ICU PROGRESS NOTE  2023      PRIMARY TEAM: Transplant Surgery  PRIMARY PHYSICIAN: Dr. Clifton  REASON FOR ICU ADMISSION/CONSULT: Close hemodynamic monitoring, CRRT  ADMITTING PHYSICIAN: Dr. 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 diff, and HTN.  He is now s/p  donor liver and kidney transplant on 23 with Dr. Clifton. He was discharged from the SICU on  and readmitted on  due to the need for CRRT per Nephrology.    Interval Events   Patient remained on CRRT with fluid removed, for I/O net negative 2046.72 mL. He received heparin straight rate 2100u/h.    TODAY'S PLAN (2023):  - Zofran scheduled with meals and compazine PRN  - RD managing TF cycling   - Regular diet with supplements   - Hemodialysis trial day per Nephrology   - Hold heparin for kidney biopsy tomorrow, timing of hold to be determined per Transplant and ID discussions   - Continue Voltaren gel for gout pain in feet  - Continue Vancomycin (stop date is )  - Will re-discuss anticoagulation plan for Afib, thrombus with Transplant       Neurological:  # Acute post-surgical pain   - Monitor neurological status. Delirium preventions and precautions  - Sedation plan: None indicated  - Pain control: PRN oxycodone 2.5-5 mg q4h, Robaxin 500 q6h     # Aphasia, right sided gaze preference, inability to follow commands, resolved  # Encephalopathy, resolved   # Hx Alcohol use disorder, sober since   -  Stroke code called ~0615 6/12  - Appreciate Neurology evaluation and assistance  - Head CT w/o contrast with no acute intracranial pathology   - MRA head/neck (w/o contrast) with no acute pathology related to presentation  - vEEG showing encephalopathy and negative for epileptiform activity   - Per discussion with patient's wife, symptoms are consistent with prior episodes of encephalopathy with increased ammonia levels (including gaze preference). Ammonia 18 6/12.   - Neurologic status improving. Alert and oriented today except year (states 1923 not 2023) 6/18. Continue with stimulation and activity. If no improvement with resolution of uremia, consider replacing tacrolimus with cyclosporine per Transplant.    - BUN 21.5 (prior 20.7)     Pulmonary:   # Post operative ventilatory support, resolved  # Acute hypoxic respiratory failure requiring mechanical ventilation, resolved  # Bilateral small pleural effusions  - Successfully extubated 6/7 to NC. Desaturations overnight 6/17 improved with 2 LPM oxymask. Maintaining oxygen saturations on 2 LPM NC.   - Aggressive pulmonary hygiene, IS, encourage OOB  - Supplemental O2 as needed to maintain SpO2 > 92%  - Continue chest physiotherapy     Cardiovascular:    #Hx pAfib on PTA warfarin  # Afib with RVR, improving  #Hx CAD  #Hx HTN  #Hypotension, orthostatic  #Shock, likely septic- improving  - ECHO 6/5/23: afib, LVEF 55-60%  - MAP goal 60-85, SBP goal 110-160. 6/10 peripheral levophed started for persistent hypotension, off since 0100 6/12. Restarted 8 pm on 6/14. Currently off norepinephrine since 6/17 afternoon.  - s/p LIJ CVC placement 6/15. CT chest showed LIJ CVC catheter in left internal jugular vein with indeterminate position of tip. Discontinued 6/16 due to suboptimal positioning after replacement with L basilic PICC by IR   - Continue midodrine 20 mg TID    - PTA metoprolol succinate ER 25 daily. Metoprolol tartrate 25mg q8h on 6/13.    - 12.5 metoprolol  tartrate TID  - Metoprolol 5 mg IV PRN for sustained tachycardia >120  - Continue to hold PTA warfarin  - Continue ASA 81 mg daily, PTA atorvastatin hold until outpatient  - Cardiology consulted; Signed off 6/11    -- TTE completed, EF 55-60%, normal ventricular function   -- Allow for permissive tachycardia, long term goal HR < 110, okay for spikes to 120-130s   -- Anticoagulation for Afib when safe from a surgical standpoint,     -- Heparin gtt post IR procedure per transplant; Start at 600 u/hr non-titratable per Transplant attending. Currently 2100 units/hour with heparin Xa level of (0.22).      - Discussed previously with Transplant changing heparin gtt plan to standard ICU nursing protocol. Will rediscuss anticoagulation plan with Transplant Surgery.    - Holding heparin for kidney biopsy 6/20, timing of stopping to be determined by discussion between IR and Transplant     Gastroenterology/Nutrition:  # Post-operative ileus, resolved  # At risk for malnutrition  # Hx of decompensated alcohol related + hereditary hemochromatosis (homozygous H63D) cirrhosis (MELD-Na 30) complicated by variceal bleeding s/p TIPS (10/1/2022) and hepatic encephalopathy now s/p DDLT 6/6  # Direct hyperbilirubinemia  - 6/7 repeat liver US: persistent low resistance waveforms and low resistive indices throughout hepatic artery system concerning for ischemia, stable velocities and upstroke  - Daily hepatic panel:   - Bilirubin remains elevated today, but decreasing: TBili 1.6 (1.7), DBili 1.26 (1.44)   - Alk phos increasingly elevated today: 240 (220)   - AST and ALT stabilizing: AST 58 (51), ALT 82 (83)   - Due to overall decreasing trend in liver, no need for liver US per transplant    - Continue to monitor hepatic labs daily  -  mg daily   - Ursodiol 250 mg BID   - Post-pyloric feeding tube placed by Radiology 6/12  - Nutrition consulted and following, appreciate input.    - Bowel regimen, Senna PRN   - PPI (Protonix)  -  Scheduled zofran for nausea with meals and PRN compazine   - SLP evaluation . Recommendation of regular diet with thin liquids, supplements ordered.  - Remains below his nutritional requirements.   - Cyclic tube feeds started evening .    Renal/ Fluids/Electrolytes:   #Lactic acidosis, resolved  # Hx ESRD on HD MWF (IgA nephropathy + ANCA vasculitis) now s/p DDKT 23  # Delayed graft function  - Expect some delayed graft function with  donor kidney, gradually increasing urine output over the past few days, responsive to diuretics  - Transplant Nephrology consulted and following, appreciate expertise  - Kidney biopsy with IR tomorrow   - Kidney ultrasound  with patent vessels. Renal ultrasound  without stenosis.    - CRRT discontinued 6/10, dialysis line removed due to positive blood cultures. Nephrology recommended CRRT be resumed () instead of HD due to tenous hemodynamics. IR placed line.   - Yesterday  pulled volume with CRRT for a 24-hour net negative 2046.72 mL  - Hemodialysis trial today   - Urine output:   - : 406 mL   - : 367 mL   - : 541 mL    - : 283 since midnight   - Lux in place, continue for close UOP monitoring  - Cr 1.07 (1.14 prior), BUN 11.4 (22.4), trial of hemodialysis     # Hyperphosphatemia, resolved  - Phos 3.7   # Hypocalcemia, resolved  - Ionized Ca 4.4  # Hypermagnesemia, resolved    - Mg 2.1  # Metabolic acidosis with decreased serum bicarbonate  - Worsening bicarb levels recently, but slowly improving: bicarb 22 today on BMP (21 prior).   - Sodium bicarb 650 mg TID started per Nephrology       Endocrine:  # Concern for adrenal insufficiency  - Cosyntropin stimulation test  ordered given hemodynamic instability with hypotension and pressor need.    - Result of cosyntropin 60 min cortisol 23.7, normal, thus the patient continued on his regular dose 10 mg oral prednisone (no stress dose steroids ordered).  - Hypotension  improved and patient no longer requiring IV pressor since afternoon 6/17.  - No indication for stress dose steroids at this time    # Stress hyperglycemia, improving  -Currently on high correction sliding scale insulin. Goal to keep BG< 180 for optimal wound healing.   - -313, received 9u aspart yesterday. Glucose 129-183 since midnight.     ID:  # Stress leukocytosis, improving  # Post-transplant ppx per transplant  - WBC 10.5 appropriately downtrending (prior 12.3). Afebrile.   -Perioperative antibiotics: Zosyn x 48 hrs (completed 6/8), micafungin x 14 days  -Immunosuppression per Transplant: Steroid taper and thymoglobulin 400 mg complete, 1 mg BID oral tacrolimus,  mg BID  -Prophylactic regimen: Valganciclovir, Bactrim Ppx     # Enterococcus bacteremia  # Septic shock, resolved  - 6/9 blood cultures x2 growing E. Faecium. 6/10, 6/11, 6/12 blood cx NGTD.   - Transplant ID consulted and following, appreciate recs  - Antibiotics:   - Vancomycin started 6/12. Continue through 20th per ID recommendations.    -  Linezolid and Zosyn stopped (610-6/12).   - Daily blood cultures until clear. Stopped daily blood cultures 6/13    Heme:     # Acute blood loss anemia   # Anemia of critical illness and chronic renal disease  # Hx hereditary hemochromatosis   # Thrombocytopenia 2/2 liver dysfunction  - Transfusion goals: INR <2, Platelets > 20, Fibrinogen > 200, Hgb >8.0  - Hgb 7.1 (7.4), Plt 149, INR 1.09   - TEG 6/17 hypercoagulable   - Restarted heparin drip 6/14, titrating based on Xa levels 6/14- morning of 6/18 (goal Xa is 0.15-0.2), 2100 U/hr and heparin Xa 0.22 this morning.    - Rediscussed anticoagulation plan for Afib, thrombus with Transplant --> Transplant contacted; okay for low intensity heparin drip now     - Will need to hold heparin for kidney biopsy tomorrow, timing pending discussion with Transplant and IR   - Upper and Lower extremity DVT US 6/15 showed a partially occlusive inferior right  internal jugular thrombus, with previous partially occlusive thrombus in the right axillary vein resolved.    Rheumatologic:  #Hx Gout  - On home dose of Prednisone 10 mg daily. Taper to 5 mg for chronic use.   - Continue Voltaren gel for pain in feet and ankles    Musculoskeletal:  # Weakness and deconditioning of critical illness   - Physical and occupational therapy consult, appreciate recommendations. Encourage increased time OOB when mental status appropriate.     Skin:  -Diligent skin care to prevent injury    General Cares/Prophylaxis:    DVT Prophylaxis: Heparin gtt, SCDs --> now low-intensity heparin gtt  GI Prophylaxis: Protonix BID  Restraints: Restraints for medical healing needed: No    Lines/ tubes/ drains:  - PIV x1  - Axillary arterial line  - CVC R external jugular (dialysis line)   - PICC line, left basilic (6/16)  - NJ tube  - Lux catheter (exchanged 6/15)       Disposition:  - Surgical ICU    Leatha Solano, MS4    Patient was seen and evaluated with the medical student. Discussed with ICU staff, Dr. Lynn.    Taco Ramirez MD  Surgery    - - - - - - - - - - - - - - - - - - - - - - - - - - - - - - - - - - - - - - - - - - - - - -   SUBJECTIVE:   See above for interval events.    Patient alert and oriented this morning, however continues to state year is 1923 not 2023. Denies shortness of breath, but continues to be on 2L NC. Endorses some mild nausea and abdominal discomfort. Has significant pain in his feet. States he has tingling like his legs fell asleep.     PHYSICAL EXAMINATION:  Temp:  [97.4  F (36.3  C)-99.2  F (37.3  C)] 98.8  F (37.1  C)  Pulse:  [] 99  Resp:  [10-41] 11  MAP:  [60 mmHg-130 mmHg] 78 mmHg  Arterial Line BP: ()/(37-76) 104/62  SpO2:  [87 %-100 %] 100 %     General: Adult male supine in bed, alert and interactive, NAD.    HEENT: NG and NJ secure in place. PERRL, EOMI. Mild scleral icterus.  Pulm/Resp: Breathing unlabored on 2L NC, equal chest rise, no  audible wheezing. Lung sounds mildly diminished bilaterally.   CV: Irregularly irregular rhythm on monitor, normal rate.  Abdomen: Soft, mildly distended, chevron incision without drainage, former MAE drain site with dressing C/D/I. Kidney incision with some serous drainage. Soft nontender reducible umbilical hernia.  : Lux catheter in place with moderate yellow urine output.   MSK/Extremities: Significant pitting peripheral edema on feet and ankles, mild erythema at medial aspect of right great toe. No edema above SCDs, peripheral pulses intact. Right arm bandaged at former PIV site.  Skin: Scattered purpura throughout face, chest, abdomen, and arms.  Neuro: Alert and oriented to self, place, and reason for hospitalization. Incorrectly answers year. Answers questions. Follows commands. Wiggles toes. Strength testing with R<L in BLE and BUE.     LABS: Reviewed.   Arterial Blood Gases   Recent Labs   Lab 06/15/23  1723   PH 7.44   PCO2 36   PO2 112*   HCO3 24     Complete Blood Count   Recent Labs   Lab 06/19/23  0354 06/18/23 2000 06/18/23  1512 06/18/23  1226   WBC 10.5 12.3* 15.1* 15.0*   HGB 7.1* 7.4* 7.9* 7.5*   * 151 179 178     Basic Metabolic Panel  Recent Labs   Lab 06/19/23  0359 06/19/23  0354 06/19/23  0011 06/18/23 2003 06/18/23 2000 06/18/23  1608 06/18/23  1226 06/18/23  0749 06/18/23  0342   NA  --  136  --   --  135*  --  134*  --  135*   POTASSIUM  --  3.8  --   --  4.1  --  4.5  --  3.8   CHLORIDE  --  102  --   --  101  --  101  --  101   CO2  --  22  --   --  23  --  21*  --  21*   BUN  --  21.5  --   --  20.7  --  20.3  --  21.4   CR  --  1.07  --   --  1.06  --  1.08  --  1.14   * 161* 129* 150* 148*   < > 237*   < > 108*    < > = values in this interval not displayed.     Liver Function Tests  Recent Labs   Lab 06/19/23  0354 06/18/23  2000 06/18/23  1512 06/18/23  1226 06/18/23  0343 06/18/23  0342 06/17/23  1137 06/17/23  0410 06/16/23  1158 06/16/23  0424   AST 58*  --    --   --   --  51*  --  56*  --  62*   ALT 82*  --   --   --   --  83*  --  95*  --  109*   ALKPHOS 240*  --   --   --   --  220*  --  279*  --  261*   BILITOTAL 1.6*  --   --   --   --  1.7*  --  1.8*  --  1.6*   ALBUMIN 2.6* 2.7*  --  2.6*  --  2.3*   < >  --    < > 2.1*   INR 1.09  --  1.11  --  1.03  --   --  1.14  --  1.10    < > = values in this interval not displayed.     Pancreatic Enzymes  No lab results found in last 7 days.  Coagulation Profile  Recent Labs   Lab 06/19/23  0354 06/18/23  1512 06/18/23  0343 06/17/23  0854 06/17/23  0410 06/16/23  0424 06/15/23  0842 06/15/23  0417   INR 1.09 1.11 1.03  --  1.14   < >  --  1.03   PTT  --  46*  --  40*  --   --  29 30    < > = values in this interval not displayed.       IMAGING:  No results found for this or any previous visit (from the past 24 hour(s)).

## 2023-06-19 NOTE — PROGRESS NOTES
Addendum 1348: Calorie count updated below based on meal slips found in envelope by RD.    Calorie Count  Intake recorded for: 6/18  Total Kcals: 1025 Total Protein: 44g  Kcals from Hospital Food: 0   Protein: 0g  Kcals from Outside Food (average):0 Protein: 0g  # Meals Ordered from Kitchen: 2 meals  # Meals Recorded: 2 meals + muffin   # Supplements Recorded: 0      Rekha Mcdonough RD, LD, CNSC  4A SICU RD pager: [phone]  Ascom: [phone]  Weekend/Holiday RD pager [phone]

## 2023-06-19 NOTE — PLAN OF CARE
Problem: Hemodialysis  Goal: Safe, Effective Therapy Delivery  Outcome: Progressing  Intervention: Optimize Device Care and Function  Recent Flowsheet Documentation  Taken 6/19/2023 1600 by Abdias Brennan, RN  Medication Review/Management:   medications reviewed   dosing adjusted   high-risk medications identified   infusion initiated  Taken 6/19/2023 1200 by Abdias Brennan, RN  Medication Review/Management:   medications reviewed   dosing adjusted   high-risk medications identified   infusion initiated  Taken 6/19/2023 0800 by Abdias Brennan, RN  Medication Review/Management:   medications reviewed   dosing adjusted   high-risk medications identified   infusion initiated   Goal Outcome Evaluation:  D. On CRRT , diuretics given . Some output- removed from CRRT,  A will try hemodialysis,    P cont to check labs- bp wnl.

## 2023-06-19 NOTE — PROGRESS NOTES
CRRT STATUS NOTE    DATA:  Time: 0630 AM  Pressures WNL:  YES  Filter Status: WDL    Problems Reported/Alarms Noted:  none    Supplies Present:  YES    ASSESSMENT:  Patient Net Fluid Balance:  Net -2050 6/18; net -390; net +8680 since admit  Vital Signs: vitals reviewed.  No pressors required  Labs:  Labs reviewed.  hbg 7.1  Goals of Therapy:  0-100 w/MAP goal >65. Pressors per SICU    INTERVENTIONS:   none    PLAN:  Fluid removal as tolerated per goals of therapy.  Restart every 72 hours and PRN.  Please notify CRRT resource RN with questions and concerns

## 2023-06-19 NOTE — PROGRESS NOTES
Major Shift Events: 0350-4762    Neuro: A/Ox4, forgetful. PERRLA. BUE +3, BLE +2. Baseline numbness/tingling. Lift to chair.  Cardiac: a fib, HR < 100 primarily. Pulses +2. Afebrile.   Resp: 2L NC. Lung sounds coarse/diminished. Encouraging pulmonary hygiene.   GI: NJ with cycled TF 8p-8a at 40cc/hr. 30cc q 4 hour free water flush. Regular diet, carb count. Intermittent gas discomfort. Normoactive bowel  LBM 6/18. Loose stools.  : stauffer intact with cloudy pavel urine. UO decreasing overnight.  CRRT: 0-100. Meeting goal.   Skin: generalized/scattered skin tears, bruising, & petechiae; clamshell incision, kidney incision leaking serous drainage    x2 R PIV   L midline  R HD CVC  R brachial art line    Plan: CRRT, encourage pulmonary hygiene, hemodynamic monitoring    For vital signs and complete assessments, please see documentation flowsheets.

## 2023-06-19 NOTE — PROGRESS NOTES
Date: 6/19/2023  Time: 06:25 PM     Data:  Pre Wt:   101.8 kg  Desired Wt:   To be established  Post Wt:  99.6 kg (estimated)  Weight change:  - 2.2 kg  Ultrafiltration - Post Run Net Total Removed (mL):  2200 ml  Vascular Access Status: CVC - CVC Lines switched due to arterial spasm but still had continuous arterial alarms  Dialyzer Rinse:  Light  Total Blood Volume Processed: 47.5 L   Total Dialysis (Treatment) Time:   3 Hrs  Dialysate Bath: K 3, Ca 3  Heparin: Heparin: None     Lab:   No     Interventions:  Dialysis done through Right IJ CVC  UF set to 2.5 Liters of fluid removal by Wei PADGETT, accommodating priming and rinse back volumes  BFR running at 250-300 (with continuous arterial spasm),   CVC dressing changed aseptically  CritLine showed a stable Profile B throughout the run, tolerating UF pull  Glucose check: Inta-dialysis: 214 mg/dl  Catheter lumens locked with Heparin, cath guards changed post HD  Report given to PCN, left in his room in stable condition     Assessment:  Taken over from Wei PADGETT with 2 hours of remaining treatment time  A/O x 4, calm & cooperative, denies pain  Lung sounds diminished anterior and lateral BUL/BLL  CVC intact, previous dressing clean and dry                Plan:    Per Renal team        KOBE CottonN, RN  Acute Dialysis RN  [facility]

## 2023-06-19 NOTE — PLAN OF CARE
Goal Outcome Evaluation:      Plan of Care Reviewed With: patient, spouse    Overall Patient Progress: no changeOverall Patient Progress: no change    Outcome Evaluation: PO diet with calorie counts and cycled EN via NDT

## 2023-06-19 NOTE — PROGRESS NOTES
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 RTOR on  with concern for low flow in the renal graft - ex-lap, washout, closure with healthy appearing graft with intact arterial and venous flow.     s/p DBD simultaneous liver kidney transplant on 2023 with complex reconstruction of accessory right hepatic artery. POD #11  * RTOR on  with concern for low flow in the renal graft - ex-lap, washout, closure with healthy appearing graft with intact arterial and venous flow.      Liver studies slowly downtrending, but ongoing elevation Alk Phos (240), ALT (82), AST (58) and total bilirubin 1.6.     - Liver US w/doppler 2023 - low waveforms and low resistive indices, but stable velocities and upstrokes.   Stent: No biliary stent in place. Ursodiol 250 mg BID.  - ASA 325mg daily      H/o CKD related IgA nephropathy and ANCA vasculitis s/p  donor renal transplant on 2023 with ureteral/J stent placement, delayed graft function. Nephrology consulted + following. Latest renal US with doppler  with faster than expected iliac vein velocity above the anastomosis may represent edema and elevated superior arcuate artery resistive index.  UOP in the last 24 hours: 541 mL. CRRT 5.1L.Temporary HD line in-place. CRRT re-started  given worsening renal dysfunction, uremia and encephalopathy.   - Bumex 2mg IV BID per nephrology  - Kidney biopsy on 2023 given slow renal function recovery; IR consult placed + pathology order placed  - Plan for hemodialysis today given systolic BP > 90  - Acute GN protocol post transplant    Metabolic acidosis: CO2 22 today. Sodium bicarbonate 650 mg TID.    Hyperphosphatemia: Hold calcium acetate 667 mg q6h    Immunosuppressed status secondary to medications:   Induction: Steroid taper and Thymoglobulin 400 mg (4 mg/kg) complete.   Maintenance:    -  mg BID  - Tacrolimus: restart 6/19/2023 at 1mg BID   - Resumed PTA prednisone 10 mg for rheumatoid arthritis (see below). Goal to wean down to 5 mg prednisone for chronic use.     Hematology:   Acute on chronic anemia: Hgb stable 7-8 without any s/sx of bleeding Last transfused 6/17.   Leukocytosis, RESOLVED: Afebrile. CT chest 6/16 with bibasilar atelectasis + small bilateral pleural effusion. Patchy densities in the left upper lobe anterolaterally. Intermittently requiring 2L NC. Infectious workup negative to date. Leukocytosis resolved on 6/19 - 10.5.    Cardiology:  Coronary artery disease: Continue  mg daily and atorvastatin 20 mg daily.      Paroxysmal atrial fibrillation:  Cardiology consulted, recommended low dose beta blockade.              * Metoprolol 12.5 mg q8h + metoprolol 5mg IV PRN for sustained tachycardia > 120.               * Low intensity heparin gtt today per Dr. Garcia [will need to be held for kidney biopsy; per IR they will plan to hold it themselves 6 hours prior to the procedure]     Hypotension: Baseline hypotension with concern for sepsis with new finding of bacteremia (last positive blood culture was 6/9/23). 6/10 Echo: EF 55-60%.              * Midodrine 20 mg Q8H              * Vasopressors - now off; s/p 6/6-6/12, 6/15-6/17     Hypoxia: Likely related to fluid overload and deconditioning. Ongoing intermittent 2L NC requirements  - Diuresis and renal replacement as above     GI/Nutrition:   Severe malnutrition in the context of acute illness: Nutrition consulted. TF via NJ at 40cc/hr from 8p to 8a + 30cc q4 hour free water flush. Regular diet with supplements. Calorie counts    Nausea: Consider scheduling zofran for nausea + PRN compazine    Endocrine:   Post-operative  elevated blood glucoses: HgA1c 6.1% (6/6/2023)              * Sliding scale insulin prn     Rheumatology:  Psoriatic arthritis: Prior to admission was on prednisone 10 mg daily, on prednisone taper as above.   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 Neurology consulted. Head CT without evidence of acute intra-cranial pathology. MRI/MRA - small area of diffusion restriction on DWI without correlation on ADC. EEG consistent with severe encephalopathy, but no seizures. Etiology of encephalopathy likely multifactorial: worsening uremia and recent bacteremia (although clearance of bacteremia has not resulted to improvement in mental status). No sedating medications; no recent opioids    Encephalopathy has improved and patient's mental status is back to baseline in the setting of improving uremia as of 6/16/2023 in the setting of uremia improvement.   - Delirium precautions     Acute post operative pain:               * Methocarbamol 500mg q4h PRN              * Oxycodone 2.5-5mgm q4h PRN     Deconditioning/Weakness: OT and PT optimization     Alcohol use disorder: Continue sober supports post transplant. Continue complete sobriety.    Infectious disease:  Hepatitis B s Ag +: Noted to be positive pre-transplant on 6/5/23, but not previously positive (1/26/2023). HBV DNA negative on 6/5/23. No clear at risk behavior. Repeat Hep B s Ag was negative and HBV DNA not detected; suspect 6/5/2023 HBsAg positivity was a false positive.     Enterococcus Faecium bacteremia: 6/9 blood cultures positive for enterococcus faecium. No vegetations noted on TTE. Urine culture 6/10/23 without any growth. ID consulted.   * Blood cultures  from 6/10, 6/11, 6/12, 6/13, and 6/16 with no growth to date  * Antibiotics: Vancomycin 6/12-6/20; s/p linezolid 6/10-6/12. S/p empiric zosyn 6/10-6/12     Prophylaxis: Mechanical DVT ppx (heparin as above), fall, GI (PPI BID), fungal (micafungin x 14 days), CMV (valganciclovir, CMV IgG Ab +), PJP (bactrim)     Disposition: Pending trial of iHD may transition to 7A tomorrow    GEORGE/Fellow/Resident Provider: Lolis Bermudez mD     Faculty: Dr. Charleen Garcia  _________________________________________________________________    Interval History:   Overnight events: No acute events overnight. Afebrile  - UOP in the last 24 hours: 541 ml + CRRT  - Stool: x 5, soft/firm in nature without over blood  - Pain in his feet bilaterally, right > left  - Nausea managed with PRN anti-emetics  - Has not stood with assistance since Friday    ROS:   A 10-point review of systems was negative except as noted above.    Meds:    aspirin  325 mg Oral or Feeding Tube Daily     atorvastatin  20 mg Oral or Feeding Tube QPM     [Held by provider] calcium acetate (phos binder)  667 mg Oral or Feeding Tube Q6H     diclofenac  4 g Topical TID     heparin  3 mL Intracatheter Q24H     insulin aspart  1-12 Units Subcutaneous Q4H     methocarbamol  500 mg Oral or Feeding Tube Q6H     metoprolol tartrate  12.5 mg Oral or Feeding Tube Q8H KARAN     micafungin  100 mg Intravenous Q24H     midodrine  20 mg Oral or Feeding Tube Q8H     multivitamin RENAL  1 tablet Oral or Feeding Tube Daily     mycophenolate  750 mg Oral BID IS     pantoprazole  40 mg Per Feeding Tube QAM AC     predniSONE  10 mg Per Feeding Tube Daily     protein modular  1 packet Per Feeding Tube TID     sodium bicarbonate  650 mg Oral or Feeding Tube TID     sodium chloride (PF)  10-40 mL Intracatheter Q7 Days     sodium chloride (PF)  3 mL Intravenous Q8H     sulfamethoxazole-trimethoprim  1 tablet Oral or Feeding Tube Once per day on Mon Wed Fri     [Held by provider] tacrolimus  1.5  "mg ORAL OR NJ TUBE BID IS     ursodiol  250 mg Oral or Feeding Tube BID     valGANciclovir  450 mg Oral Daily    Or     valGANciclovir  450 mg Oral or NG Tube Daily     vancomycin  1,750 mg (central catheter) Intravenous Q24H       Physical Exam:     Admit Weight: 102.3 kg (225 lb 8 oz)    Current vitals:   BP (!) 83/69   Pulse 99   Temp 98.8  F (37.1  C) (Axillary)   Resp 11   Ht 1.702 m (5' 7\")   Wt 101.8 kg (224 lb 6.9 oz)   SpO2 100%   BMI 35.15 kg/m      Vital sign ranges:    Temp:  [97.5  F (36.4  C)-99.2  F (37.3  C)] 98.8  F (37.1  C)  Pulse:  [] 99  Resp:  [10-41] 11  MAP:  [60 mmHg-130 mmHg] 78 mmHg  Arterial Line BP: ()/(37-76) 104/62  SpO2:  [87 %-100 %] 100 %    General Appearance: Lying in bed, comfortable.   Skin: Warm, perfused  Heart: irregular rhythm, distant heart sounds  Lungs: coarse lung sounds bilaterally. 2L NC  Abdomen: Obese, surgical scar - clean, dry and intact.  : stauffer is present.  Urine is dark in color, clear.   Extremities: 2+ edema in upper and lower extremities bilaterally  - Tophaceous gout on right foot.   Neurologic: A&Ox4. Mild tremor bilaterally, R>L    Data:   CMP  Recent Labs   Lab 06/19/23  0804 06/19/23  0359 06/19/23  0354 06/18/23 2003 06/18/23 2000 06/18/23  0749 06/18/23  0342   NA  --   --  136  --  135*   < > 135*   POTASSIUM  --   --  3.8  --  4.1   < > 3.8   CHLORIDE  --   --  102  --  101   < > 101   CO2  --   --  22  --  23   < > 21*   * 163* 161*   < > 148*   < > 108*   BUN  --   --  21.5  --  20.7   < > 21.4   CR  --   --  1.07  --  1.06   < > 1.14   GFRESTIMATED  --   --  77  --  78   < > 71   NAZARIO  --   --  8.3*  --  8.2*   < > 8.0*   ICAW  --   --  4.4  --  4.4   < > 4.5   MAG  --   --  2.1  --  2.2   < > 2.2   PHOS  --   --  3.7  --  4.0   < > 3.8   ALBUMIN  --   --  2.6*  --  2.7*   < > 2.3*   BILITOTAL  --   --  1.6*  --   --   --  1.7*   ALKPHOS  --   --  240*  --   --   --  220*   AST  --   --  58*  --   --   --  51*   ALT  " --   --  82*  --   --   --  83*    < > = values in this interval not displayed.     CBC  Recent Labs   Lab 06/19/23  0354 06/18/23 2000   HGB 7.1*  7.1* 7.4*   WBC 10.5  10.5 12.3*   *  149* 151

## 2023-06-20 ENCOUNTER — APPOINTMENT (OUTPATIENT)
Dept: INTERVENTIONAL RADIOLOGY/VASCULAR | Facility: CLINIC | Age: 68
End: 2023-06-20
Payer: COMMERCIAL

## 2023-06-20 ENCOUNTER — APPOINTMENT (OUTPATIENT)
Dept: PHYSICAL THERAPY | Facility: CLINIC | Age: 68
End: 2023-06-20
Attending: INTERNAL MEDICINE
Payer: COMMERCIAL

## 2023-06-20 ENCOUNTER — APPOINTMENT (OUTPATIENT)
Dept: ULTRASOUND IMAGING | Facility: CLINIC | Age: 68
End: 2023-06-20
Attending: INTERNAL MEDICINE
Payer: COMMERCIAL

## 2023-06-20 PROBLEM — K56.7 ILEUS, POSTOPERATIVE (H): Status: ACTIVE | Noted: 2023-06-13

## 2023-06-20 PROBLEM — Z76.82 LIVER TRANSPLANT CANDIDATE: Status: RESOLVED | Noted: 2023-06-05 | Resolved: 2023-06-20

## 2023-06-20 PROBLEM — T86.19 DELAYED GRAFT FUNCTION OF KIDNEY: Status: ACTIVE | Noted: 2023-06-20

## 2023-06-20 PROBLEM — K91.89 ILEUS, POSTOPERATIVE (H): Status: ACTIVE | Noted: 2023-06-13

## 2023-06-20 PROBLEM — D84.9 IMMUNOSUPPRESSED STATUS (H): Status: ACTIVE | Noted: 2023-06-20

## 2023-06-20 PROBLEM — I48.91 ATRIAL FIBRILLATION WITH RAPID VENTRICULAR RESPONSE (H): Status: ACTIVE | Noted: 2023-06-20

## 2023-06-20 PROBLEM — I95.9 HYPOTENSION: Status: ACTIVE | Noted: 2023-06-20

## 2023-06-20 LAB
ALBUMIN SERPL BCG-MCNC: 2.6 G/DL (ref 3.5–5.2)
ALP SERPL-CCNC: 323 U/L (ref 40–129)
ALT SERPL W P-5'-P-CCNC: 93 U/L (ref 0–70)
ANION GAP SERPL CALCULATED.3IONS-SCNC: 13 MMOL/L (ref 7–15)
AST SERPL W P-5'-P-CCNC: 88 U/L (ref 0–45)
BASOPHILS # BLD MANUAL: 0.1 10E3/UL (ref 0–0.2)
BASOPHILS NFR BLD MANUAL: 1 %
BILIRUB DIRECT SERPL-MCNC: 1.93 MG/DL (ref 0–0.3)
BILIRUB SERPL-MCNC: 2.2 MG/DL
BUN SERPL-MCNC: 26.1 MG/DL (ref 8–23)
CA-I BLD-MCNC: 4.7 MG/DL (ref 4.4–5.2)
CALCIUM SERPL-MCNC: 8.7 MG/DL (ref 8.8–10.2)
CHLORIDE SERPL-SCNC: 95 MMOL/L (ref 98–107)
CREAT SERPL-MCNC: 1.51 MG/DL (ref 0.67–1.17)
DEPRECATED HCO3 PLAS-SCNC: 22 MMOL/L (ref 22–29)
EOSINOPHIL # BLD MANUAL: 0.1 10E3/UL (ref 0–0.7)
EOSINOPHIL NFR BLD MANUAL: 1 %
ERYTHROCYTE [DISTWIDTH] IN BLOOD BY AUTOMATED COUNT: 18.9 % (ref 10–15)
FIBRINOGEN PPP-MCNC: 926 MG/DL (ref 170–490)
GFR SERPL CREATININE-BSD FRML MDRD: 51 ML/MIN/1.73M2
GLUCOSE BLDC GLUCOMTR-MCNC: 150 MG/DL (ref 70–99)
GLUCOSE BLDC GLUCOMTR-MCNC: 155 MG/DL (ref 70–99)
GLUCOSE BLDC GLUCOMTR-MCNC: 170 MG/DL (ref 70–99)
GLUCOSE BLDC GLUCOMTR-MCNC: 172 MG/DL (ref 70–99)
GLUCOSE BLDC GLUCOMTR-MCNC: 222 MG/DL (ref 70–99)
GLUCOSE BLDC GLUCOMTR-MCNC: 299 MG/DL (ref 70–99)
GLUCOSE SERPL-MCNC: 170 MG/DL (ref 70–99)
HCT VFR BLD AUTO: 22.4 % (ref 40–53)
HGB BLD-MCNC: 7.1 G/DL (ref 13.3–17.7)
INR PPP: 1.08 (ref 0.85–1.15)
LYMPHOCYTES # BLD MANUAL: 0 10E3/UL (ref 0.8–5.3)
LYMPHOCYTES NFR BLD MANUAL: 0 %
MAGNESIUM SERPL-MCNC: 1.7 MG/DL (ref 1.7–2.3)
MCH RBC QN AUTO: 30.7 PG (ref 26.5–33)
MCHC RBC AUTO-ENTMCNC: 31.7 G/DL (ref 31.5–36.5)
MCV RBC AUTO: 97 FL (ref 78–100)
MONOCYTES # BLD MANUAL: 0 10E3/UL (ref 0–1.3)
MONOCYTES NFR BLD MANUAL: 0 %
NEUTROPHILS # BLD MANUAL: 12.8 10E3/UL (ref 1.6–8.3)
NEUTROPHILS NFR BLD MANUAL: 98 %
NRBC # BLD AUTO: 0.1 10E3/UL
NRBC BLD MANUAL-RTO: 1 %
PHOSPHATE SERPL-MCNC: 2.6 MG/DL (ref 2.5–4.5)
PLAT MORPH BLD: ABNORMAL
PLATELET # BLD AUTO: 160 10E3/UL (ref 150–450)
POLYCHROMASIA BLD QL SMEAR: ABNORMAL
POTASSIUM SERPL-SCNC: 3.4 MMOL/L (ref 3.4–5.3)
PROT SERPL-MCNC: 5.4 G/DL (ref 6.4–8.3)
RBC # BLD AUTO: 2.31 10E6/UL (ref 4.4–5.9)
RBC MORPH BLD: ABNORMAL
SODIUM SERPL-SCNC: 130 MMOL/L (ref 136–145)
TACROLIMUS BLD-MCNC: 3.4 UG/L (ref 5–15)
TME LAST DOSE: ABNORMAL H
TME LAST DOSE: ABNORMAL H
UFH PPP CHRO-ACNC: <0.1 IU/ML
WBC # BLD AUTO: 13.1 10E3/UL (ref 4–11)

## 2023-06-20 PROCEDURE — 250N000012 HC RX MED GY IP 250 OP 636 PS 637

## 2023-06-20 PROCEDURE — 88348 ELECTRON MICROSCOPY DX: CPT | Mod: 26 | Performed by: PATHOLOGY

## 2023-06-20 PROCEDURE — 88350 IMFLUOR EA ADDL 1ANTB STN PX: CPT | Mod: TC | Performed by: INTERNAL MEDICINE

## 2023-06-20 PROCEDURE — 83735 ASSAY OF MAGNESIUM: CPT | Performed by: SURGERY

## 2023-06-20 PROCEDURE — 88305 TISSUE EXAM BY PATHOLOGIST: CPT | Mod: TC | Performed by: INTERNAL MEDICINE

## 2023-06-20 PROCEDURE — 250N000013 HC RX MED GY IP 250 OP 250 PS 637: Performed by: INTERNAL MEDICINE

## 2023-06-20 PROCEDURE — 85007 BL SMEAR W/DIFF WBC COUNT: CPT | Performed by: SURGERY

## 2023-06-20 PROCEDURE — 88346 IMFLUOR 1ST 1ANTB STAIN PX: CPT | Mod: 26 | Performed by: PATHOLOGY

## 2023-06-20 PROCEDURE — 99233 SBSQ HOSP IP/OBS HIGH 50: CPT | Mod: 24 | Performed by: INTERNAL MEDICINE

## 2023-06-20 PROCEDURE — 200N000002 HC R&B ICU UMMC

## 2023-06-20 PROCEDURE — 76942 ECHO GUIDE FOR BIOPSY: CPT | Mod: 26 | Performed by: PHYSICIAN ASSISTANT

## 2023-06-20 PROCEDURE — 97110 THERAPEUTIC EXERCISES: CPT | Mod: GP

## 2023-06-20 PROCEDURE — 250N000011 HC RX IP 250 OP 636: Performed by: STUDENT IN AN ORGANIZED HEALTH CARE EDUCATION/TRAINING PROGRAM

## 2023-06-20 PROCEDURE — 250N000011 HC RX IP 250 OP 636: Performed by: INTERNAL MEDICINE

## 2023-06-20 PROCEDURE — 82330 ASSAY OF CALCIUM: CPT | Performed by: SURGERY

## 2023-06-20 PROCEDURE — 99232 SBSQ HOSP IP/OBS MODERATE 35: CPT | Mod: GC | Performed by: SURGERY

## 2023-06-20 PROCEDURE — 93975 VASCULAR STUDY: CPT

## 2023-06-20 PROCEDURE — 250N000012 HC RX MED GY IP 250 OP 636 PS 637: Performed by: PHYSICIAN ASSISTANT

## 2023-06-20 PROCEDURE — 250N000011 HC RX IP 250 OP 636: Performed by: RADIOLOGY

## 2023-06-20 PROCEDURE — 85610 PROTHROMBIN TIME: CPT | Performed by: SURGERY

## 2023-06-20 PROCEDURE — 85520 HEPARIN ASSAY: CPT | Performed by: TRANSPLANT SURGERY

## 2023-06-20 PROCEDURE — 250N000012 HC RX MED GY IP 250 OP 636 PS 637: Performed by: INTERNAL MEDICINE

## 2023-06-20 PROCEDURE — 250N000009 HC RX 250: Performed by: PHYSICIAN ASSISTANT

## 2023-06-20 PROCEDURE — 250N000013 HC RX MED GY IP 250 OP 250 PS 637: Performed by: PHYSICIAN ASSISTANT

## 2023-06-20 PROCEDURE — 85384 FIBRINOGEN ACTIVITY: CPT | Performed by: PHYSICIAN ASSISTANT

## 2023-06-20 PROCEDURE — 82248 BILIRUBIN DIRECT: CPT | Performed by: SURGERY

## 2023-06-20 PROCEDURE — 84100 ASSAY OF PHOSPHORUS: CPT | Performed by: SURGERY

## 2023-06-20 PROCEDURE — 88305 TISSUE EXAM BY PATHOLOGIST: CPT | Mod: 26 | Performed by: PATHOLOGY

## 2023-06-20 PROCEDURE — 94799 UNLISTED PULMONARY SVC/PX: CPT

## 2023-06-20 PROCEDURE — 99233 SBSQ HOSP IP/OBS HIGH 50: CPT | Mod: 24 | Performed by: SURGERY

## 2023-06-20 PROCEDURE — 250N000011 HC RX IP 250 OP 636

## 2023-06-20 PROCEDURE — 80197 ASSAY OF TACROLIMUS: CPT | Performed by: INTERNAL MEDICINE

## 2023-06-20 PROCEDURE — 250N000013 HC RX MED GY IP 250 OP 250 PS 637: Performed by: NURSE PRACTITIONER

## 2023-06-20 PROCEDURE — 272N000505 IR RENAL BIOPSY RIGHT

## 2023-06-20 PROCEDURE — 97530 THERAPEUTIC ACTIVITIES: CPT | Mod: GP

## 2023-06-20 PROCEDURE — 88313 SPECIAL STAINS GROUP 2: CPT | Mod: 26 | Performed by: PATHOLOGY

## 2023-06-20 PROCEDURE — 50200 RENAL BIOPSY PERQ: CPT | Mod: RT | Performed by: PHYSICIAN ASSISTANT

## 2023-06-20 PROCEDURE — 88350 IMFLUOR EA ADDL 1ANTB STN PX: CPT | Mod: 26 | Performed by: PATHOLOGY

## 2023-06-20 PROCEDURE — 250N000013 HC RX MED GY IP 250 OP 250 PS 637: Performed by: STUDENT IN AN ORGANIZED HEALTH CARE EDUCATION/TRAINING PROGRAM

## 2023-06-20 PROCEDURE — 250N000013 HC RX MED GY IP 250 OP 250 PS 637: Performed by: TRANSPLANT SURGERY

## 2023-06-20 PROCEDURE — 93975 VASCULAR STUDY: CPT | Mod: 26 | Performed by: STUDENT IN AN ORGANIZED HEALTH CARE EDUCATION/TRAINING PROGRAM

## 2023-06-20 PROCEDURE — 85027 COMPLETE CBC AUTOMATED: CPT | Performed by: SURGERY

## 2023-06-20 RX ORDER — PREDNISONE 20 MG/1
20 TABLET ORAL ONCE
Status: COMPLETED | OUTPATIENT
Start: 2023-06-20 | End: 2023-06-20

## 2023-06-20 RX ORDER — ALLOPURINOL 100 MG/1
100 TABLET ORAL DAILY
Status: DISCONTINUED | OUTPATIENT
Start: 2023-06-20 | End: 2023-06-25 | Stop reason: HOSPADM

## 2023-06-20 RX ORDER — LIDOCAINE 40 MG/G
CREAM TOPICAL
Status: DISCONTINUED | OUTPATIENT
Start: 2023-06-20 | End: 2023-06-20

## 2023-06-20 RX ADMIN — Medication 12.5 MG: at 15:11

## 2023-06-20 RX ADMIN — LIDOCAINE HYDROCHLORIDE 7 ML: 10 INJECTION, SOLUTION EPIDURAL; INFILTRATION; INTRACAUDAL; PERINEURAL at 13:52

## 2023-06-20 RX ADMIN — MYCOPHENOLATE MOFETIL 750 MG: 200 POWDER, FOR SUSPENSION ORAL at 18:23

## 2023-06-20 RX ADMIN — Medication 40 MG: at 08:24

## 2023-06-20 RX ADMIN — SODIUM BICARBONATE 650 MG: 650 TABLET ORAL at 08:22

## 2023-06-20 RX ADMIN — SODIUM BICARBONATE 650 MG: 650 TABLET ORAL at 20:10

## 2023-06-20 RX ADMIN — METHOCARBAMOL 500 MG: 500 TABLET ORAL at 02:17

## 2023-06-20 RX ADMIN — METHOCARBAMOL 500 MG: 500 TABLET ORAL at 20:00

## 2023-06-20 RX ADMIN — DICLOFENAC SODIUM 4 G: 10 GEL TOPICAL at 08:25

## 2023-06-20 RX ADMIN — SODIUM BICARBONATE 650 MG: 650 TABLET ORAL at 15:11

## 2023-06-20 RX ADMIN — URSODIOL 250 MG: 250 TABLET, FILM COATED ORAL at 08:23

## 2023-06-20 RX ADMIN — ASPIRIN 325 MG: 325 TABLET ORAL at 08:23

## 2023-06-20 RX ADMIN — PREDNISONE 10 MG: 10 TABLET ORAL at 08:22

## 2023-06-20 RX ADMIN — Medication 3 ML: at 19:53

## 2023-06-20 RX ADMIN — MIDODRINE HYDROCHLORIDE 20 MG: 5 TABLET ORAL at 22:07

## 2023-06-20 RX ADMIN — MIDODRINE HYDROCHLORIDE 20 MG: 5 TABLET ORAL at 05:54

## 2023-06-20 RX ADMIN — Medication 12.5 MG: at 22:07

## 2023-06-20 RX ADMIN — Medication 12.5 MG: at 05:55

## 2023-06-20 RX ADMIN — METHOCARBAMOL 500 MG: 500 TABLET ORAL at 08:22

## 2023-06-20 RX ADMIN — METHOCARBAMOL 500 MG: 500 TABLET ORAL at 15:11

## 2023-06-20 RX ADMIN — HEPARIN SODIUM 2100 UNITS/HR: 10000 INJECTION, SOLUTION INTRAVENOUS at 20:15

## 2023-06-20 RX ADMIN — BUMETANIDE 2 MG: 0.25 INJECTION INTRAMUSCULAR; INTRAVENOUS at 15:11

## 2023-06-20 RX ADMIN — MIDODRINE HYDROCHLORIDE 20 MG: 5 TABLET ORAL at 15:11

## 2023-06-20 RX ADMIN — BUMETANIDE 2 MG: 0.25 INJECTION INTRAMUSCULAR; INTRAVENOUS at 08:23

## 2023-06-20 RX ADMIN — ALLOPURINOL 100 MG: 100 TABLET ORAL at 10:47

## 2023-06-20 RX ADMIN — DICLOFENAC SODIUM 4 G: 10 GEL TOPICAL at 20:23

## 2023-06-20 RX ADMIN — DICLOFENAC SODIUM 4 G: 10 GEL TOPICAL at 15:13

## 2023-06-20 RX ADMIN — MYCOPHENOLATE MOFETIL 750 MG: 200 POWDER, FOR SUSPENSION ORAL at 08:24

## 2023-06-20 RX ADMIN — ONDANSETRON 4 MG: 4 TABLET, ORALLY DISINTEGRATING ORAL at 08:23

## 2023-06-20 RX ADMIN — B-COMPLEX W/ C & FOLIC ACID TAB 1 MG 1 TABLET: 1 TAB at 08:23

## 2023-06-20 RX ADMIN — TACROLIMUS 1 MG: 5 CAPSULE ORAL at 18:23

## 2023-06-20 RX ADMIN — PREDNISONE 20 MG: 20 TABLET ORAL at 10:08

## 2023-06-20 RX ADMIN — URSODIOL 250 MG: 250 TABLET, FILM COATED ORAL at 20:00

## 2023-06-20 RX ADMIN — ONDANSETRON 4 MG: 4 TABLET, ORALLY DISINTEGRATING ORAL at 17:00

## 2023-06-20 RX ADMIN — ATORVASTATIN CALCIUM 20 MG: 20 TABLET, FILM COATED ORAL at 20:00

## 2023-06-20 RX ADMIN — TACROLIMUS 1 MG: 5 CAPSULE ORAL at 08:24

## 2023-06-20 RX ADMIN — INSULIN GLARGINE 10 UNITS: 100 INJECTION, SOLUTION SUBCUTANEOUS at 22:07

## 2023-06-20 ASSESSMENT — ACTIVITIES OF DAILY LIVING (ADL)
ADLS_ACUITY_SCORE: 38
ADLS_ACUITY_SCORE: 36
ADLS_ACUITY_SCORE: 38
ADLS_ACUITY_SCORE: 36
ADLS_ACUITY_SCORE: 38

## 2023-06-20 NOTE — PROGRESS NOTES
Calorie Count  Intake recorded for: 6/19  Total Kcals: 418 Total Protein: 31g  Kcals from Hospital Food: 418   Protein: 31g  Kcals from Outside Food (average):0 Protein: 0g  # Meals Ordered from Kitchen: 2 meals ordered  # Meals Recorded: 2 meals recorded (First- 100% one 8oz milk, 30% ham & cheese omelet)       (Second- 100% diet LL soda, 20% cheeseburger)  # Supplements Recorded: 0 supps recorded

## 2023-06-20 NOTE — PROGRESS NOTES
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 RTOR on  with concern for low flow in the renal graft - ex-lap, washout, closure with healthy appearing graft with intact arterial and venous flow.     s/p DBD simultaneous liver kidney transplant on 2023 with complex reconstruction of accessory right hepatic artery.   * RTOR on  with concern for low flow in the renal graft - ex-lap, washout, closure with healthy appearing graft with intact arterial and venous flow.      Liver studies up trending slightly with alk phos 240 -> 323 and total bilirubin 1.93 from 1.26. ASAT and ALT 88 and 93 respectively.     - Follow up liver ultrasound with doppler; ordered 2023    Stent: No biliary stent in place. Ursodiol 250 mg BID.  - ASA 325mg daily      H/o CKD related IgA nephropathy and ANCA vasculitis s/p  donor renal transplant on 2023 with ureteral/J stent placement, delayed graft function. Nephrology consulted + following. Latest renal US with doppler  with faster than expected iliac vein velocity above the anastomosis may represent edema and elevated superior arcuate artery resistive index.  UOP in the last 24 hours: 1343 mL. CRRT 3.5L.Temporary HD line in-place. CRRT re-started  given worsening renal dysfunction, uremia and encephalopathy.   - Continue bumex 2mg IV BID per nephrology  - Follow up kidney biopsy on 2023 given slow renal function recovery; IR consult placed + pathology order placed  - No plan for CRRT or HD today  - Lux removal; bladder scan protocol    Metabolic acidosis: CO2 22 today. Sodium bicarbonate 650 mg TID.   Hyperphosphatemia: Hold calcium  acetate 667 mg q6h    Immunosuppressed status secondary to medications:   Induction: Steroid taper and Thymoglobulin 400 mg (4 mg/kg) complete.   Maintenance:    -  mg BID  - Tacrolimus 1mg BID; goal 5-8 given slow renal recovery  - Resumed PTA prednisone for rheumatoid arthritis (see below). Goal to wean down to 5 mg prednisone for chronic use.     Hematology:   Acute on chronic anemia: Hgb stable 7-8 without any s/sx of bleeding Last transfused 6/17.   Leukocytosis: Afebrile. CT chest 6/16 with bibasilar atelectasis + small bilateral pleural effusion. Patchy densities in the left upper lobe anterolaterally. Infectious workup negative to date. Breathing comfortably on ambient air.     Cardiology:  Coronary artery disease: Continue  mg daily and atorvastatin 20 mg daily.      Paroxysmal atrial fibrillation:  Cardiology consulted.              * Metoprolol 12.5 mg q8h + metoprolol 5mg IV PRN for sustained tachycardia > 120.               * Low intensity heparin gtt [plan to hold for 6 hours pre-kidney biopsy]   * Will plan to discuss long term anti-coagulation plan following renal biopsy       Hypotension: 6/10 Echo: EF 55-60%.              * Midodrine 20 mg Q8H              * Vasopressors - now off; s/p 6/6-6/12, 6/15-6/17     Hypoxia, RESOLVED: Likely related to fluid overload and deconditioning. Ongoing intermittent 2L NC requirements  - Diuresis and renal replacement therapy as above     GI/Nutrition:   Severe malnutrition in the context of acute illness: Nutrition consulted. TF via NJ at 40cc/hr from 8p to 8a + 30cc q4 hour free water flush. Regular diet with supplements. 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 Neurology consulted. Head CT without evidence of acute intra-cranial pathology. MRI/MRA - small area of diffusion restriction on DWI without correlation on ADC. EEG consistent with severe encephalopathy, but no seizures. Etiology of encephalopathy likely multifactorial: worsening uremia and recent bacteremia (although clearance of bacteremia has not resulted to improvement in mental status). No sedating medications; no recent opioids    Encephalopathy has improved and patient's mental status is back to baseline in the setting of improving uremia as of 6/16/2023 in the setting of uremia improvement.   - Delirium precautions     Acute post operative pain:               * Methocarbamol 500mg q4h PRN              * Oxycodone 2.5-5mgm q4h PRN     Deconditioning/Weakness: OT and PT optimization     Alcohol use disorder: Continue sober supports post transplant. Continue complete sobriety.    Infectious disease:  Hepatitis B s Ag +: Noted to be positive pre-transplant on 6/5/23, but not previously positive (1/26/2023). HBV DNA negative on 6/5/23. No clear at risk behavior. Repeat Hep B s Ag was negative and HBV DNA not detected; suspect 6/5/2023 HBsAg positivity was a false positive.     Enterococcus Faecium bacteremia: 6/9 blood cultures positive for enterococcus faecium. No vegetations noted on TTE. Urine culture 6/10/23 without any growth. ID consulted.   * Blood cultures from 6/10, 6/11, 6/12, 6/13, and 6/16 with no growth to date  * Antibiotics: Vancomycin 6/12-6/20; s/p linezolid 6/10-6/12. S/p  empiric zosyn 6/10-6/12     Prophylaxis: Mechanical DVT ppx (heparin as above), fall, GI (PPI BID), fungal (micafungin x 14 days), CMV (valganciclovir, CMV IgG Ab +), PJP (bactrim)     Disposition: Plan to transition to 7A when able    GEORGE/Fellow/Resident Provider: Lolis Bermudez MD     Faculty: Dr. Conor Dc  _________________________________________________________________    Interval History:   Overnight events: No acute events overnight. Afebrile  - UOP in the last 24 hours: 1343; CRRT 3.5  - Stool output in the last 24 hours: 2 x  - Ongoing pain in feet, right > left  - Nausea and stomach upset; somewhat managed with PRN zofran  - Bruno with assistance yesterday    ROS:   A 10-point review of systems was negative except as noted above.    Meds:    aspirin  325 mg Oral or Feeding Tube Daily     atorvastatin  20 mg Oral or Feeding Tube QPM     bumetanide  2 mg Intravenous BID     [Held by provider] calcium acetate (phos binder)  667 mg Oral or Feeding Tube Q6H     diclofenac  4 g Topical TID     fiber modular  1 packet Per Feeding Tube TID     heparin  3 mL Intracatheter Q24H     insulin aspart  1-12 Units Subcutaneous Q4H     insulin glargine  10 Units Subcutaneous At Bedtime     methocarbamol  500 mg Oral or Feeding Tube Q6H     metoprolol tartrate  12.5 mg Oral or Feeding Tube Q8H KARAN     midodrine  20 mg Oral or Feeding Tube Q8H     multivitamin RENAL  1 tablet Oral or Feeding Tube Daily     mycophenolate  750 mg Oral BID IS     ondansetron  4 mg Oral TID AC    Or     ondansetron  4 mg Intravenous TID AC     pantoprazole  40 mg Per Feeding Tube QAM AC     predniSONE  10 mg Per Feeding Tube Daily     protein modular  1 packet Per Feeding Tube TID     sodium bicarbonate  650 mg Oral or Feeding Tube TID     sodium chloride (PF)  10-40 mL Intracatheter Q7 Days     sodium chloride (PF)  3 mL Intravenous Q8H     sulfamethoxazole-trimethoprim  1 tablet Oral or Feeding Tube Once per day on Mon Wed Fri      "tacrolimus  1 mg ORAL OR NJ TUBE BID IS     ursodiol  250 mg Oral or Feeding Tube BID     valGANciclovir  450 mg Oral Once per day on Mon Fri    Or     valGANciclovir  450 mg Oral or NG Tube Once per day on Mon Fri     vancomycin place issa - receiving intermittent dosing  1 each Intravenous See Admin Instructions       Physical Exam:     Admit Weight: 102.3 kg (225 lb 8 oz)    Current vitals:   BP (!) 83/69   Pulse 102   Temp 97.8  F (36.6  C) (Axillary)   Resp 10   Ht 1.702 m (5' 7\")   Wt 102.6 kg (226 lb 3.1 oz)   SpO2 99%   BMI 35.43 kg/m      Vital sign ranges:    Temp:  [97.8  F (36.6  C)-99.3  F (37.4  C)] 97.8  F (36.6  C)  Pulse:  [] 102  Resp:  [9-44] 10  MAP:  [62 mmHg-197 mmHg] 80 mmHg  Arterial Line BP: ()/() 105/64  SpO2:  [93 %-100 %] 99 %    General Appearance: Lying in bed, comfortable.   Skin: Warm, perfused  Heart: irregular rhythm, distant heart sounds  Lungs: coarse lung sounds bilaterally. Breathing comfortably on ambient air  Abdomen: Obese, surgical scar (liver + kidney) - clean, dry and intact.  : stauffer is present.  Urine is dark in color.   Extremities: 2+ edema in upper and lower extremities bilaterally  - Tophaceous gout on right foot.   Neurologic: A&Ox4.     Data:   CMP  Recent Labs   Lab 06/20/23  0833 06/20/23  0352 06/20/23  0344 06/19/23  0359 06/19/23  0354   NA  --   --  130*  --  136   POTASSIUM  --   --  3.4  --  3.8   CHLORIDE  --   --  95*  --  102   CO2  --   --  22  --  22   * 170* 170*   < > 161*   BUN  --   --  26.1*  --  21.5   CR  --   --  1.51*  --  1.07   GFRESTIMATED  --   --  51*  --  77   NAZARIO  --   --  8.7*  --  8.3*   ICAW  --   --  4.7  --  4.4   MAG  --   --  1.7  --  2.1   PHOS  --   --  2.6  --  3.7   ALBUMIN  --   --  2.6*  --  2.6*   BILITOTAL  --   --  2.2*  --  1.6*   ALKPHOS  --   --  323*  --  240*   AST  --   --  88*  --  58*   ALT  --   --  93*  --  82*    < > = values in this interval not displayed.     CBC  Recent " Labs   Lab 06/20/23  0344 06/19/23  0354   HGB 7.1* 7.1*  7.1*   WBC 13.1* 10.5  10.5    149*  149*     Liver US w/doppler 6/20/2023  1.  Patent Doppler evaluation of the liver transplant vasculature. There is continued low resistive indices in the right and left hepatic arteries, with post stenotic appearance of the arterial waveform,  concerning for more proximal vascular narrowing/significant stenosis  2.  There are two postoperative fluid collections that are not definitively seen on prior ultrasound, measuring 10.8 cm medial to the right hepatic lobe and 5.5 cm in the left hepatic lobe, likely  intraparenchymal, possibly postoperative hematomas/seromas.

## 2023-06-20 NOTE — PLAN OF CARE
Problem: Liver Transplant  Goal: Optimal Coping with Organ Transplant  Outcome: Progressing  Intervention: Optimize Psychosocial Response  Recent Flowsheet Documentation  Taken 6/20/2023 1600 by Abdias Brennan, RN  Family/Support System Care: family care conference arranged  Taken 6/20/2023 1200 by Abdias Brennan, RN  Family/Support System Care: family care conference arranged  Taken 6/20/2023 0800 by Abdias Brennan, RN  Family/Support System Care: family care conference arranged   Goal Outcome Evaluation:         D. Up out of bed with assist- to chair - worked with therapy- vss- down for kidney bx.   A stauffer discontinued- a line discontinued, no bleeding at site noted-   I stable uneventful day. Family at bedside

## 2023-06-20 NOTE — PROCEDURES
Damion BROWN Joni  [id number]    Completed ultrasound guided RLQ transplant kidney biopsy.  A total of three passes yielded tissue cores collected for further pathological evaluation.  Microscopy support present.  No immediate complications.  Dx: slow renal recovery.  Casey.  DUNCAN

## 2023-06-20 NOTE — PROGRESS NOTES
[facility]   Transplant Nephrology Progress Note  Date of Admission:  6/5/2023  Today's Date: 06/20/2023    Recommendations:  - kidney txp biopsy today  - continue with bumex 2mg q12h  - remove stauffer. Monitor UOP. 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 Increasing UOP.Started iHD on 6/19. Good urine output with diuretic. Monitor urine output.    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  Slow trend down in LFTs.  Followed by Transplant Surgery.    # Immunosuppression: Tacrolimus immediate release (goal 5-8), Mycophenolate mofetil (dose 750 mg every 12 hours) and Prednisone (dose 10 mg daily)    - Induction with Recent Transplant:  rATG total 4mg/kg, stopped due to sepsis   - Changes: Not at this time. Plan to wean pred down to 5mg daily once tac goal is increased (after renal recovery)    # Infection Prophylaxis:   - PJP: Sulfa/TMP (Bactrim)  - CMV: Valganciclovir (Valcyte);CMV IgG Ab positive  - Thrush: Micafungin (Mycamine)  - Fungal: Micafungin (Mycamine)    # Hypotension, improving: Hypotensive, now off pressors Goal BP: MAP > 65   - Volume status: Moderate hypervolemia  EDW ~ 96.4 kg   - Changes: Not at this time. Continue iHD PRN.     -On metoprolol 12.5mg q8h for afib and midodrine 20mg q8h for hypotension   -Give bumex  2mg IV BID    # Elevated Blood Glucose: Glucose generally running ~ 140-180s'   - On insulin prn.   - Management as per primary team.    # Anemia in Chronic Renal Disease/Surgery: Hgb: Stable, low      FEDERICO: No   - Iron studies: Replete    - Transfusion for Hgb <7 mg/dl, management per primary team    # Mineral Bone Disorder:   - Secondary renal hyperparathyroidism; PTH level: Minimally elevated ( pg/ml)        On treatment: None  - Vitamin D; level: Normal        On supplement: No  - Calcium; level: Normal       On supplement: No  - Phosphorus; level: Normal     On binder: No    # Electrolytes:   - Potassium; level: Low normal        On supplement: No  - Magnesium; level: Normal        On supplement: No  - Bicarbonate; level: Stable low        On supplement: Yes  - Sodium; level: Low    # Paroxysmal A-Fib: Stable heart rate on beta blocker and anticoagulation with IV heparin.    # History of C.diff: Continues with loose stools, but negative C. diff with last check 6/11.    # E. Faecium Bacteremia: Diagnosed on 6/9 BlCx. Treating with vancomycin thru 6/20    # ANCA Vasculitis: S/p Rituximab x2   - Will be on acute GN protocol post transplant.    # Gout: On prednisone 10 mg PO daily PTA. Currently on prednisone 10 with plan to eventually wean to prednisone 5 once kidney is working and tac increased >8   -Can consider pred burst.     # Transplant History:  Etiology of Kidney Failure: IgA nephropathy and ANCA vasculitis  Tx: Liver Tx (SLK)  Transplant: 6/6/2023 (Liver), 6/6/2023 (Kidney)  Significant changes in immunosuppression: None  Significant transplant-related complications: None    Recommendations were communicated to the primary team verbally     Eran Cisse MD   Visiting Resident Nephrology PGY II   06/20/23  12:59 PM        Physician Attestation   I, Subhash Dutta MD, personally examined and evaluated this patient.  I discussed the patient with the resident/fellow and care team, and agree with the  assessment and plan of care as documented in the note on 06/20/23 .      I personally reviewed vital signs, medications, labs, and imaging.    Key findings: No indication for dialysis today. Proceed with kidney txp biopsy. Ok to transfer out of ICU as tolerated iHD yesterday. Evaluate daily for iHD. Continue bumex 2mg IV BID. Ok to remove stauffer but monitor UOP.   Subhash Dutta MD  Date of Service (when I saw the patient): 06/20/23          Interval History   Mr. Quinones's creatinine is 1.07 (06/19);  .  Patient will be having biopsy performed today. Bumex was increased with decent urine output. LFTs have also paradoxically increased. Overall, he is looking better, with more energy.   He tolerated iHD yesterday well without significant drop in BP.       Review of Systems   4 point ROS was obtained and negative except as noted in the Interval History.    MEDICATIONS:    allopurinol  100 mg Oral Daily     aspirin  325 mg Oral or Feeding Tube Daily     atorvastatin  20 mg Oral or Feeding Tube QPM     bumetanide  2 mg Intravenous BID     [Held by provider] calcium acetate (phos binder)  667 mg Oral or Feeding Tube Q6H     diclofenac  4 g Topical TID     fiber modular  1 packet Per Feeding Tube TID     heparin  3 mL Intracatheter Q24H     insulin aspart  1-12 Units Subcutaneous Q4H     insulin glargine  10 Units Subcutaneous At Bedtime     methocarbamol  500 mg Oral or Feeding Tube Q6H     metoprolol tartrate  12.5 mg Oral or Feeding Tube Q8H KARAN     midodrine  20 mg Oral or Feeding Tube Q8H     multivitamin RENAL  1 tablet Oral or Feeding Tube Daily     mycophenolate  750 mg Oral BID IS     ondansetron  4 mg Oral TID AC    Or     ondansetron  4 mg Intravenous TID AC     pantoprazole  40 mg Per Feeding Tube QAM AC     [START ON 6/21/2023] predniSONE  30 mg Per Feeding Tube Daily     protein modular  1 packet Per Feeding Tube TID     sodium bicarbonate  650 mg Oral or Feeding Tube TID     sodium chloride (PF)  10-40 mL  "Intracatheter Q7 Days     sodium chloride (PF)  3 mL Intravenous Q8H     sulfamethoxazole-trimethoprim  1 tablet Oral or Feeding Tube Once per day on      tacrolimus  1 mg ORAL OR NJ TUBE BID IS     ursodiol  250 mg Oral or Feeding Tube BID     valGANciclovir  450 mg Oral Once per day on     Or     valGANciclovir  450 mg Oral or NG Tube Once per day on      vancomycin place issa - receiving intermittent dosing  1 each Intravenous See Admin Instructions       dextrose       dextrose       heparin Stopped (23 0200)       Physical Exam   Temp  Av.9  F (36.6  C)  Min: 97  F (36.1  C)  Max: 99.2  F (37.3  C)  Arterial Line BP  Min: 60/45  Max: 131/80  Arterial Line MAP (mmHg)  Av mmHg  Min: 52 mmHg  Max: 101 mmHg      Pulse  Av.5  Min: 68  Max: 159 Resp  Av.8  Min: 14  Max: 23  FiO2 (%)  Av.3 %  Min: 40 %  Max: 50 %  SpO2  Av.1 %  Min: 92 %  Max: 100 %    CVP (mmHg): 6 mmHgBP (!) 83/69   Pulse 111   Temp 98  F (36.7  C) (Axillary)   Resp 15   Ht 1.702 m (5' 7\")   Wt 102.6 kg (226 lb 3.1 oz)   SpO2 97%   BMI 35.43 kg/m      Admit Weight: 102.3 kg (225 lb 8 oz)     GENERAL APPEARANCE: alert and no distress  HENT: mouth without ulcers or lesions  RESP: lungs clear to auscultation - no rales, rhonchi or wheezes  CV: regular rhythm, normal rate, no rub, no murmur  EDEMA: trace to 1+ LE edema bilaterally  ABDOMEN: soft, nondistended, mildly tender, bowel sounds normal  MS: extremities normal - no gross deformities noted, no evidence of inflammation in joints, no muscle tenderness  SKIN: no rash but multiple hematomas on several parts of his body including neck, arm.   TX KIDNEY: mild TTP  DIALYSIS ACCESS:  Temporary catheter right internal jugular    Data   All labs reviewed by me.  CMP  Recent Labs   Lab 23  1218 23  0833 23  0352 23  0344 23  0359 23  0354 23  1608 23  1226 " 06/18/23  0749 06/18/23  0342 06/17/23  0412 06/17/23  0410   NA  --   --   --  130*  --  136  --  135*  --  134*  --  135*   < >  --    POTASSIUM  --   --   --  3.4  --  3.8  --  4.1  --  4.5  --  3.8   < >  --    CHLORIDE  --   --   --  95*  --  102  --  101  --  101  --  101   < >  --    CO2  --   --   --  22  --  22  --  23  --  21*  --  21*   < >  --    ANIONGAP  --   --   --  13  --  12  --  11  --  12  --  13   < >  --    * 150* 170* 170*   < > 161*   < > 148*   < > 237*   < > 108*   < >  --    BUN  --   --   --  26.1*  --  21.5  --  20.7  --  20.3  --  21.4   < >  --    CR  --   --   --  1.51*  --  1.07  --  1.06  --  1.08  --  1.14   < >  --    GFRESTIMATED  --   --   --  51*  --  77  --  78  --  76  --  71   < >  --    NAZARIO  --   --   --  8.7*  --  8.3*  --  8.2*  --  8.0*  --  8.0*   < >  --    MAG  --   --   --  1.7  --  2.1  --  2.2  --  2.2  --  2.2   < > 2.1   PHOS  --   --   --  2.6  --  3.7  --  4.0  --  4.1  --  3.8   < >  --    PROTTOTAL  --   --   --  5.4*  --  5.1*  --   --   --   --   --  4.7*  --  4.5*   ALBUMIN  --   --   --  2.6*  --  2.6*  --  2.7*  --  2.6*  --  2.3*   < >  --    BILITOTAL  --   --   --  2.2*  --  1.6*  --   --   --   --   --  1.7*  --  1.8*   ALKPHOS  --   --   --  323*  --  240*  --   --   --   --   --  220*  --  279*   AST  --   --   --  88*  --  58*  --   --   --   --   --  51*  --  56*   ALT  --   --   --  93*  --  82*  --   --   --   --   --  83*  --  95*    < > = values in this interval not displayed.     CBC  Recent Labs   Lab 06/20/23 0344 06/19/23 0354 06/18/23 2000 06/18/23  1512   HGB 7.1* 7.1*  7.1* 7.4* 7.9*   WBC 13.1* 10.5  10.5 12.3* 15.1*   RBC 2.31* 2.35*  2.35* 2.43* 2.60*   HCT 22.4* 23.0*  23.0* 23.2* 25.0*   MCV 97 98  98 96 96   MCH 30.7 30.2  30.2 30.5 30.4   MCHC 31.7 30.9*  30.9* 31.9 31.6   RDW 18.9* 19.2*  19.2* 19.0* 19.3*    149*  149* 151 179     INR  Recent Labs   Lab 06/20/23 0344 06/19/23 0354 06/18/23  1512  06/18/23  0343 06/17/23  0854 06/16/23  0424 06/15/23  0842 06/15/23  0417   INR 1.08 1.09 1.11 1.03  --    < >  --  1.03   PTT  --   --  46*  --  40*  --  29 30    < > = values in this interval not displayed.     ABG  Recent Labs   Lab 06/15/23  1723   PH 7.44   PCO2 36   PO2 112*   HCO3 24   O2PER 2  2      Urine Studies  Recent Labs   Lab Test 06/16/23  1220 06/10/23  1613 06/05/23  1620 04/07/23  1246   COLOR Yellow Orange* Yellow Yellow   APPEARANCE Cloudy* Cloudy* Clear Clear   URINEGLC 150* 30* Negative Negative   URINEBILI Negative Negative Negative Negative   URINEKETONE Negative Negative Negative Negative   SG 1.014 1.021 1.012 1.011   UBLD Large* Large* Moderate* Moderate*   URINEPH 5.5 5.5 5.5 5.5   PROTEIN 70* 100* 10* 10*   NITRITE Negative Negative Negative Negative   LEUKEST Large* Large* Negative Negative   RBCU >182* >182* 2 5*   WBCU 56* 128* 7* 2     No lab results found.  PTH  Recent Labs   Lab Test 05/19/23  0956   PTHI 67*     Iron Studies  Recent Labs   Lab Test 11/22/22  0848   IRON 68   *   IRONSAT 31   HOLA 429*       IMAGING:  All imaging studies reviewed by me.

## 2023-06-20 NOTE — PROGRESS NOTES
SURGICAL ICU PROGRESS NOTE  2023      PRIMARY TEAM: Transplant Surgery  PRIMARY PHYSICIAN: Dr. Clifton  REASON FOR ICU ADMISSION/CONSULT: Close hemodynamic monitoring, CRRT  ADMITTING PHYSICIAN: Dr. 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 diff, and HTN.  He is now s/p  donor liver and kidney transplant on 23 with Dr. Clifton. He was discharged from the SICU on  and readmitted on  due to the need for CRRT per Nephrology.    Interval Events   Patient remained on CRRT with fluid removed, as well as a successful hemodialysis in the afternoon, for I/O net negative 3075.2 mL. He received heparin straight rate 2100u/h.    TODAY'S PLAN (2023):   - Hold heparin and TF for kidney biopsy with IR this morning   - Liver US with Doppler   - Regular diet with supplements and TF cycling to meet nutrition requirements  - Start basal insulin, Lantus 10U, at bedtime   - Continue hemodialysis per nephrology   - Continue Bumex 2 mg BID   - Keep stauffer in place due to biopsy and initiation of hemodialysis   - Zofran scheduled with meals and compazine PRN  - Continue Voltaren gel for gout pain in feet  - Last dose of Vancomycin course   - Will re-discuss anticoagulation plan for Afib, thrombus with Transplant   - Plan to repeat DVT US 1 week after first US ()   - No longer requiring SICU cares, transfer to floor       Neurological:  # Acute post-surgical pain   - Monitor neurological status.  Delirium preventions and precautions  - Sedation plan: None indicated  - Pain control: Robaxin 500 q6h, PRN oxycodone 2.5-5 mg q4h    # Aphasia, right sided gaze preference, inability to follow commands, resolved  # Encephalopathy, resolved   # Hx Alcohol use disorder, sober since 2016  - Stroke code called ~2740 6/12  - Appreciate Neurology evaluation and assistance  - Head CT w/o contrast with no acute intracranial pathology   - MRA head/neck (w/o contrast) with no acute pathology related to presentation  - vEEG showing encephalopathy and negative for epileptiform activity   - Per discussion with patient's wife, symptoms are consistent with prior episodes of encephalopathy with increased ammonia levels (including gaze preference). Ammonia 18 6/12.   - Neurologic status improving. Alert and oriented today except date 6/20. Continue with stimulation and activity. If no improvement with resolution of uremia, consider replacing tacrolimus with cyclosporine per Transplant.    - BUN 26.1 (prior 21.5)     Pulmonary:   # Post operative ventilatory support, resolved  # Acute hypoxic respiratory failure requiring mechanical ventilation, resolved  # Bilateral small pleural effusions  - Successfully extubated 6/7 to NC. Maintaining oxygen saturations on room air with intermittent use of 2 LPM NC.   - Aggressive pulmonary hygiene, IS, encourage OOB  - Supplemental O2 as needed to maintain SpO2 > 92%  - Continue chest physiotherapy     Cardiovascular:    #Hx pAfib on PTA warfarin  # Afib with RVR, improving  #Hx CAD  #Hx HTN  #Hypotension, orthostatic  #Shock, likely septic- improving  - ECHO 6/5/23: afib, LVEF 55-60%  - MAP goal 60-85, SBP goal 110-160. 6/10 peripheral levophed started for persistent hypotension, off since 0100 6/12. Restarted 8 pm on 6/14. Currently off norepinephrine since 6/17 afternoon.  - s/p LIJ CVC placement 6/15. CT chest showed LIJ CVC catheter in left internal jugular vein with indeterminate position  of tip. Discontinued 6/16 due to suboptimal positioning after replacement with L basilic PICC by IR   - Continue midodrine 20 mg TID    - PTA metoprolol succinate ER 25 daily. Metoprolol tartrate 25mg q8h on 6/13.    - 12.5 metoprolol tartrate TID  - Metoprolol 5 mg IV PRN for sustained tachycardia >120  - Continue to hold PTA warfarin  - Continue ASA 81 mg daily and PTA atorvastatin 20 mg daily   - Cardiology consulted; Signed off 6/11    -- TTE completed, EF 55-60%, normal ventricular function   -- Allow for permissive tachycardia, long term goal HR < 110, okay for spikes to 120-130s   -- Anticoagulation for Afib when safe from a surgical standpoint,     -- Heparin gtt post IR procedure per transplant; Start at 600 u/hr non-titratable per Transplant attending. Yesterday was at 2100 units/hour with heparin Xa level of (<0.10).      - Discussed previously with Transplant changing heparin gtt plan to standard ICU nursing protocol. Will continue to rediscuss anticoagulation plan with Transplant Surgery.    - Holding heparin for kidney biopsy 6/20 am     Gastroenterology/Nutrition:  # Post-operative ileus, resolved  # At risk for malnutrition  # Hx of decompensated alcohol related + hereditary hemochromatosis (homozygous H63D) cirrhosis (MELD-Na 30) complicated by variceal bleeding s/p TIPS (10/1/2022) and hepatic encephalopathy now s/p DDLT 6/6  # Direct hyperbilirubinemia  - 6/7 repeat liver US: persistent low resistance waveforms and low resistive indices throughout hepatic artery system concerning for ischemia, stable velocities and upstroke  - Daily hepatic panel:   - Bilirubin remains elevated today: TBili 2.2 (1.6), DBili 1.93 (1.26)   - Alk phos increasingly elevated today: 323 (240)   - AST and ALT increasing: AST 88 (58), ALT 93 (82)   - Due to increase in liver panel labs, liver ultrasound with doppler ordered for today 6/20   - Per transplant, no liver biopsy    - Continue to monitor hepatic labs daily  -   mg daily   - Ursodiol 250 mg BID   - Post-pyloric feeding tube placed by Radiology   - Nutrition consulted and following, appreciate input.    - Bowel regimen, Senna PRN   - PPI (Protonix)  - Scheduled zofran for nausea with meals and PRN compazine   - SLP evaluation . Recommendation of regular diet with thin liquids, supplements ordered.  - Remains below his nutritional requirements. 418 kcal from food yesterday with 31 g protein.   - Cyclic tube feeds started evening .    Renal/ Fluids/Electrolytes:   #Lactic acidosis, resolved  # Hx ESRD on HD MWF (IgA nephropathy + ANCA vasculitis) now s/p DDKT 23  # Delayed graft function  - Expect some delayed graft function with  donor kidney, gradually increasing urine output over the past few days, responsive to diuretics  - Transplant Nephrology consulted and following, appreciate expertise  - Kidney biopsy with IR today   - Kidney ultrasound  with patent vessels. Renal ultrasound  without stenosis.    - CRRT discontinued 6/10, dialysis line removed due to positive blood cultures. Nephrology recommended CRRT be resumed () instead of HD due to tenous hemodynamics. IR placed line.   - Yesterday  pulled volume with CRRT and trialed hemodialysis for a 24-hour net negative 3075.2 mL  - Pitting edema on exam in legs, chest, and right hand  - 2 mg Bumex scheduled BID  - Tolerated hemodialysis yesterday. Continue hemodialysis today. CRRT discontinued.   - Urine output:   - : 367 mL   - : 541 mL    - : 1343 mL    - : 515 mL since midnight  - Lux in place, continue for close UOP monitoring  - Cr 1.51 (1.07 prior), BUN 26.1 (11.4), trial of hemodialysis     # Hyperphosphatemia, resolved  - Phos 2.6   # Hypocalcemia, resolved  - Ionized Ca 4.7  # Hypermagnesemia, resolved    - Mg 1.7  # Metabolic acidosis with decreased serum bicarbonate  - Worsening bicarb levels recently, but slowly improving: bicarb 22 today on  BMP (22 prior).   - Sodium bicarb 650 mg TID started per Nephrology 6/14      Endocrine:  # Concern for adrenal insufficiency  - Cosyntropin stimulation test 6/17 ordered given hemodynamic instability with hypotension and pressor need.    - Result of cosyntropin 60 min cortisol 23.7, normal, thus the patient continued on his regular dose 10 mg oral prednisone (no stress dose steroids ordered).  - Hypotension improved and patient no longer requiring IV pressor since afternoon 6/17.  - No indication for stress dose steroids at this time    # Stress hyperglycemia, improving  -Currently on high correction sliding scale insulin. Goal to keep BG< 180 for optimal wound healing.   - BG levels >200 yesterday. Received 15 U of insulin. Start basal Lantus 10U at bedtime.    ID:  # Stress leukocytosis, improving  # Post-transplant ppx per transplant  - WBC 13.5 (prior 10.5). Afebrile.   -Perioperative antibiotics: Zosyn x 48 hrs (completed 6/8), micafungin x 14 days  -Immunosuppression per Transplant: Steroid taper and thymoglobulin 400 mg complete (currently taking 10 mg prednisone for gout), 1 mg BID oral tacrolimus,  mg BID  -Prophylactic regimen: Valganciclovir, Bactrim Ppx     # Enterococcus bacteremia  # Septic shock, resolved  - 6/9 blood cultures x2 growing E. Faecium. 6/10, 6/11, 6/12 blood cx NGTD.   - Transplant ID consulted and following, appreciate recs  - Antibiotics:   - Vancomycin started 6/12. Last dose today, 6/20.     -  Linezolid and Zosyn stopped (610-6/12).   - Daily blood cultures until clear. Stopped daily blood cultures 6/13    Heme:     # Acute blood loss anemia   # Anemia of critical illness and chronic renal disease  # Hx hereditary hemochromatosis   # Thrombocytopenia 2/2 liver dysfunction  - Transfusion goals: INR <2, Platelets > 20, Fibrinogen > 200, Hgb >8.0  - Hgb 7.1 (7.1), Plt 160, INR 1.08   - TEG 6/17 hypercoagulable   - Restarted heparin drip 6/14, titrating based on Xa levels 6/14-  morning of 6/18 (goal Xa is 0.15-0.2), 2100 U/hr and heparin Xa <0.10 this morning.    - Rediscussed anticoagulation plan for Afib, thrombus with Transplant --> Transplant contacted; okay for low intensity heparin drip now     - Will need to hold heparin for kidney biopsy tomorrow, timing pending discussion with Transplant and IR   - Upper and Lower extremity DVT US 6/15 showed a partially occlusive inferior right internal jugular thrombus, with previous partially occlusive thrombus in the right axillary vein resolved.    - Repeat US in 1 week (6/22)    Rheumatologic:  #Hx Gout  - On home dose of Prednisone 10 mg daily. Taper to 5 mg for chronic use.   - Continue Voltaren gel for pain in feet and ankles    Musculoskeletal:  # Weakness and deconditioning of critical illness   - Physical and occupational therapy consult, appreciate recommendations. Encourage increased time OOB when mental status appropriate.     Skin:  -Diligent skin care to prevent injury    General Cares/Prophylaxis:    DVT Prophylaxis: Heparin gtt, SCDs --> now low-intensity heparin gtt  GI Prophylaxis: Protonix BID  Restraints: Restraints for medical healing needed: No    Lines/ tubes/ drains:  - PIV x1  - Axillary arterial line  - CVC R external jugular (dialysis line)   - PICC line, left basilic (6/16)  - NJ tube  - Lux catheter (exchanged 6/15) - leave in place due to biopsy and hemodialysis        Disposition:  - Surgical ICU    Leatha Solano, MS4    Patient was seen and evaluated with the medical student. Discussed with ICU staff, Dr. Lynn.     Taco Ramirez MD  Surgery    - - - - - - - - - - - - - - - - - - - - - - - - - - - - - - - - - - - - - - - - - - - - - -   SUBJECTIVE:   See above for interval events.    Patient feels well this morning. Stated he slept well. No abdominal pain today. Nausea has improved. Feels like medication for nausea is helping a little bit, but sometimes when he eats he still gets discomfort in his  right upper quadrant. Appetite has improved but still not back at his normal. Legs continue to feel tingly, similar to when he has gout flares prior to hospitalization. He would like to continue to increase activity. Went up to the chair yesterday and that felt good. No shortness of breath. Is using IS and other pulmonary hygiene tools.     PHYSICAL EXAMINATION:  Temp:  [97.8  F (36.6  C)-99.3  F (37.4  C)] 97.8  F (36.6  C)  Pulse:  [] 102  Resp:  [9-44] 11  MAP:  [62 mmHg-197 mmHg] 72 mmHg  Arterial Line BP: ()/() 95/58  SpO2:  [93 %-100 %] 93 %     General: Adult male supine in bed, alert and interactive, NAD.    HEENT: NG and NJ secure in place. PERRL, EOMI. Mild scleral icterus - improving from yesterday.  Pulm/Resp: Breathing unlabored on room air, equal chest rise, no audible wheezing or crackles. Lung sounds mildly diminished bilaterally.   CV: Irregularly irregular rhythm on monitor, normal rate.  Abdomen: Soft, mildly distended, no tenderness with palpation. Chevron incision without drainage, former MAE drain site with dressing C/D/I. Kidney incision with some serous drainage. Soft nontender reducible umbilical hernia.  : Lux catheter in place with moderate clear yellow urine output.   MSK/Extremities: Significant pitting peripheral edema on feet, ankles, right arm, and chest. No edema above SCDs, peripheral pulses intact.  Skin: Scattered purpura throughout face, chest, abdomen, and arms.  Neuro: Alert and oriented to self, place, and reason for hospitalization. Difficulty forming response to date and year. Answers questions appropriately. Follows commands. Wiggles toes. Strength testing with R<L in BLE and BUE.     LABS: Reviewed.   Arterial Blood Gases   Recent Labs   Lab 06/15/23  1723   PH 7.44   PCO2 36   PO2 112*   HCO3 24     Complete Blood Count   Recent Labs   Lab 06/20/23  0344 06/19/23  0354 06/18/23 2000 06/18/23  1512   WBC 13.1* 10.5  10.5 12.3* 15.1*   HGB 7.1* 7.1*   7.1* 7.4* 7.9*    149*  149* 151 179     Basic Metabolic Panel  Recent Labs   Lab 06/20/23 0352 06/20/23 0344 06/19/23 2328 06/19/23 2013 06/19/23 0359 06/19/23 0354 06/18/23 2003 06/18/23 2000 06/18/23  1608 06/18/23  1226   NA  --  130*  --   --   --  136  --  135*  --  134*   POTASSIUM  --  3.4  --   --   --  3.8  --  4.1  --  4.5   CHLORIDE  --  95*  --   --   --  102  --  101  --  101   CO2  --  22  --   --   --  22  --  23  --  21*   BUN  --  26.1*  --   --   --  21.5  --  20.7  --  20.3   CR  --  1.51*  --   --   --  1.07  --  1.06  --  1.08   * 170* 155* 242*   < > 161*   < > 148*   < > 237*    < > = values in this interval not displayed.     Liver Function Tests  Recent Labs   Lab 06/20/23 0344 06/19/23 0354 06/18/23 2000 06/18/23 1512 06/18/23  1226 06/18/23 0343 06/18/23 0342 06/17/23  1137 06/17/23  0410   AST 88* 58*  --   --   --   --  51*  --  56*   ALT 93* 82*  --   --   --   --  83*  --  95*   ALKPHOS 323* 240*  --   --   --   --  220*  --  279*   BILITOTAL 2.2* 1.6*  --   --   --   --  1.7*  --  1.8*   ALBUMIN 2.6* 2.6* 2.7*  --  2.6*  --  2.3*   < >  --    INR 1.08 1.09  --  1.11  --  1.03  --   --  1.14    < > = values in this interval not displayed.     Pancreatic Enzymes  No lab results found in last 7 days.  Coagulation Profile  Recent Labs   Lab 06/20/23 0344 06/19/23 0354 06/18/23  1512 06/18/23  0343 06/17/23  0854 06/16/23  0424 06/15/23  0842 06/15/23  0417   INR 1.08 1.09 1.11 1.03  --    < >  --  1.03   PTT  --   --  46*  --  40*  --  29 30    < > = values in this interval not displayed.       IMAGING:  No results found for this or any previous visit (from the past 24 hour(s)).

## 2023-06-20 NOTE — IR NOTE
Patient Name: Damion Quinones  Medical Record Number: [id number]  Today's Date: 6/20/2023    Procedure: renal transplant biopsy  Proceduralist: YRIS Peña PA-C  Pathology present: yes    Procedure Start: 1350  Procedure end: 1400  Sedation medications administered: none    Report given to: Betito MACEDO RN    Other Notes: Pt arrived to IR room 6 from . Consent reviewed. Pt denies any questions or concerns regarding procedure. Pt positioned supine and monitored per protocol. Pt tolerated procedure without any noted complications. Pt transferred back to .  3 cores handed off to pathology.

## 2023-06-21 ENCOUNTER — APPOINTMENT (OUTPATIENT)
Dept: PHYSICAL THERAPY | Facility: CLINIC | Age: 68
End: 2023-06-21
Attending: INTERNAL MEDICINE
Payer: COMMERCIAL

## 2023-06-21 ENCOUNTER — APPOINTMENT (OUTPATIENT)
Dept: OCCUPATIONAL THERAPY | Facility: CLINIC | Age: 68
End: 2023-06-21
Attending: INTERNAL MEDICINE
Payer: COMMERCIAL

## 2023-06-21 LAB
ABO/RH(D): NORMAL
ALBUMIN SERPL BCG-MCNC: 2.4 G/DL (ref 3.5–5.2)
ALP SERPL-CCNC: 286 U/L (ref 40–129)
ALT SERPL W P-5'-P-CCNC: 84 U/L (ref 0–70)
ANION GAP SERPL CALCULATED.3IONS-SCNC: 15 MMOL/L (ref 7–15)
ANTIBODY SCREEN: NEGATIVE
AST SERPL W P-5'-P-CCNC: 49 U/L (ref 0–45)
BACTERIA BLD CULT: NO GROWTH
BACTERIA BLD CULT: NO GROWTH
BILIRUB DIRECT SERPL-MCNC: 0.95 MG/DL (ref 0–0.3)
BILIRUB SERPL-MCNC: 1.2 MG/DL
BUN SERPL-MCNC: 51.6 MG/DL (ref 8–23)
CALCIUM SERPL-MCNC: 8.8 MG/DL (ref 8.8–10.2)
CHLORIDE SERPL-SCNC: 92 MMOL/L (ref 98–107)
CREAT SERPL-MCNC: 2.52 MG/DL (ref 0.67–1.17)
DEPRECATED HCO3 PLAS-SCNC: 23 MMOL/L (ref 22–29)
ERYTHROCYTE [DISTWIDTH] IN BLOOD BY AUTOMATED COUNT: 19.1 % (ref 10–15)
ERYTHROCYTE [DISTWIDTH] IN BLOOD BY AUTOMATED COUNT: 19.3 % (ref 10–15)
GFR SERPL CREATININE-BSD FRML MDRD: 28 ML/MIN/1.73M2
GLUCOSE BLDC GLUCOMTR-MCNC: 157 MG/DL (ref 70–99)
GLUCOSE BLDC GLUCOMTR-MCNC: 201 MG/DL (ref 70–99)
GLUCOSE BLDC GLUCOMTR-MCNC: 220 MG/DL (ref 70–99)
GLUCOSE BLDC GLUCOMTR-MCNC: 221 MG/DL (ref 70–99)
GLUCOSE BLDC GLUCOMTR-MCNC: 317 MG/DL (ref 70–99)
GLUCOSE BLDC GLUCOMTR-MCNC: 325 MG/DL (ref 70–99)
GLUCOSE SERPL-MCNC: 204 MG/DL (ref 70–99)
HCT VFR BLD AUTO: 22.2 % (ref 40–53)
HCT VFR BLD AUTO: 24.2 % (ref 40–53)
HGB BLD-MCNC: 6.8 G/DL (ref 13.3–17.7)
HGB BLD-MCNC: 7.4 G/DL (ref 13.3–17.7)
HOLD SPECIMEN: NORMAL
MAGNESIUM SERPL-MCNC: 1.8 MG/DL (ref 1.7–2.3)
MCH RBC QN AUTO: 30 PG (ref 26.5–33)
MCH RBC QN AUTO: 30.1 PG (ref 26.5–33)
MCHC RBC AUTO-ENTMCNC: 30.6 G/DL (ref 31.5–36.5)
MCHC RBC AUTO-ENTMCNC: 30.6 G/DL (ref 31.5–36.5)
MCV RBC AUTO: 98 FL (ref 78–100)
MCV RBC AUTO: 98 FL (ref 78–100)
PHOSPHATE SERPL-MCNC: 3.5 MG/DL (ref 2.5–4.5)
PLATELET # BLD AUTO: 212 10E3/UL (ref 150–450)
PLATELET # BLD AUTO: 215 10E3/UL (ref 150–450)
POTASSIUM SERPL-SCNC: 3.6 MMOL/L (ref 3.4–5.3)
PROT SERPL-MCNC: 5.3 G/DL (ref 6.4–8.3)
RBC # BLD AUTO: 2.27 10E6/UL (ref 4.4–5.9)
RBC # BLD AUTO: 2.46 10E6/UL (ref 4.4–5.9)
SODIUM SERPL-SCNC: 130 MMOL/L (ref 136–145)
SPECIMEN EXPIRATION DATE: NORMAL
TACROLIMUS BLD-MCNC: 4.2 UG/L (ref 5–15)
TME LAST DOSE: ABNORMAL H
TME LAST DOSE: ABNORMAL H
UFH PPP CHRO-ACNC: 0.13 IU/ML
UFH PPP CHRO-ACNC: 0.16 IU/ML
UFH PPP CHRO-ACNC: 0.17 IU/ML
WBC # BLD AUTO: 16.9 10E3/UL (ref 4–11)
WBC # BLD AUTO: 17.3 10E3/UL (ref 4–11)

## 2023-06-21 PROCEDURE — 85027 COMPLETE CBC AUTOMATED: CPT | Performed by: PHYSICIAN ASSISTANT

## 2023-06-21 PROCEDURE — 250N000011 HC RX IP 250 OP 636: Mod: JZ | Performed by: PHYSICIAN ASSISTANT

## 2023-06-21 PROCEDURE — 250N000013 HC RX MED GY IP 250 OP 250 PS 637: Performed by: INTERNAL MEDICINE

## 2023-06-21 PROCEDURE — 250N000013 HC RX MED GY IP 250 OP 250 PS 637: Performed by: STUDENT IN AN ORGANIZED HEALTH CARE EDUCATION/TRAINING PROGRAM

## 2023-06-21 PROCEDURE — 250N000012 HC RX MED GY IP 250 OP 636 PS 637: Performed by: INTERNAL MEDICINE

## 2023-06-21 PROCEDURE — 250N000013 HC RX MED GY IP 250 OP 250 PS 637: Performed by: PHYSICIAN ASSISTANT

## 2023-06-21 PROCEDURE — 85014 HEMATOCRIT: CPT | Performed by: TRANSPLANT SURGERY

## 2023-06-21 PROCEDURE — 250N000013 HC RX MED GY IP 250 OP 250 PS 637: Performed by: TRANSPLANT SURGERY

## 2023-06-21 PROCEDURE — 86901 BLOOD TYPING SEROLOGIC RH(D): CPT | Performed by: TRANSPLANT SURGERY

## 2023-06-21 PROCEDURE — 85520 HEPARIN ASSAY: CPT | Performed by: TRANSPLANT SURGERY

## 2023-06-21 PROCEDURE — 99233 SBSQ HOSP IP/OBS HIGH 50: CPT | Mod: 24 | Performed by: INTERNAL MEDICINE

## 2023-06-21 PROCEDURE — 36415 COLL VENOUS BLD VENIPUNCTURE: CPT | Performed by: TRANSPLANT SURGERY

## 2023-06-21 PROCEDURE — 250N000011 HC RX IP 250 OP 636: Performed by: INTERNAL MEDICINE

## 2023-06-21 PROCEDURE — 250N000011 HC RX IP 250 OP 636

## 2023-06-21 PROCEDURE — 97110 THERAPEUTIC EXERCISES: CPT | Mod: GO

## 2023-06-21 PROCEDURE — 86850 RBC ANTIBODY SCREEN: CPT | Performed by: TRANSPLANT SURGERY

## 2023-06-21 PROCEDURE — 80197 ASSAY OF TACROLIMUS: CPT | Performed by: TRANSPLANT SURGERY

## 2023-06-21 PROCEDURE — 97530 THERAPEUTIC ACTIVITIES: CPT | Mod: GO

## 2023-06-21 PROCEDURE — 97110 THERAPEUTIC EXERCISES: CPT | Mod: GP

## 2023-06-21 PROCEDURE — 250N000011 HC RX IP 250 OP 636: Performed by: STUDENT IN AN ORGANIZED HEALTH CARE EDUCATION/TRAINING PROGRAM

## 2023-06-21 PROCEDURE — 250N000012 HC RX MED GY IP 250 OP 636 PS 637: Performed by: PHYSICIAN ASSISTANT

## 2023-06-21 PROCEDURE — 83735 ASSAY OF MAGNESIUM: CPT | Performed by: TRANSPLANT SURGERY

## 2023-06-21 PROCEDURE — 200N000002 HC R&B ICU UMMC

## 2023-06-21 PROCEDURE — 97535 SELF CARE MNGMENT TRAINING: CPT | Mod: GO

## 2023-06-21 PROCEDURE — 99232 SBSQ HOSP IP/OBS MODERATE 35: CPT | Mod: 24 | Performed by: TRANSPLANT SURGERY

## 2023-06-21 PROCEDURE — 84100 ASSAY OF PHOSPHORUS: CPT | Performed by: TRANSPLANT SURGERY

## 2023-06-21 PROCEDURE — 97530 THERAPEUTIC ACTIVITIES: CPT | Mod: GP

## 2023-06-21 PROCEDURE — 999N000155 HC STATISTIC RAPCV CVP MONITORING

## 2023-06-21 PROCEDURE — 250N000013 HC RX MED GY IP 250 OP 250 PS 637: Performed by: NURSE PRACTITIONER

## 2023-06-21 PROCEDURE — 36415 COLL VENOUS BLD VENIPUNCTURE: CPT

## 2023-06-21 PROCEDURE — 250N000011 HC RX IP 250 OP 636: Mod: JZ | Performed by: RADIOLOGY

## 2023-06-21 PROCEDURE — 82248 BILIRUBIN DIRECT: CPT | Performed by: TRANSPLANT SURGERY

## 2023-06-21 RX ORDER — MIDODRINE HYDROCHLORIDE 5 MG/1
10 TABLET ORAL EVERY 8 HOURS
Status: DISCONTINUED | OUTPATIENT
Start: 2023-06-21 | End: 2023-06-22

## 2023-06-21 RX ORDER — MIDODRINE HYDROCHLORIDE 5 MG/1
15 TABLET ORAL EVERY 8 HOURS
Status: DISCONTINUED | OUTPATIENT
Start: 2023-06-21 | End: 2023-06-21

## 2023-06-21 RX ORDER — TACROLIMUS 1 MG/1
1 CAPSULE ORAL
Status: DISCONTINUED | OUTPATIENT
Start: 2023-06-21 | End: 2023-06-21

## 2023-06-21 RX ORDER — HEPARIN SODIUM 10000 [USP'U]/100ML
2000 INJECTION, SOLUTION INTRAVENOUS CONTINUOUS
Status: DISCONTINUED | OUTPATIENT
Start: 2023-06-21 | End: 2023-06-23

## 2023-06-21 RX ADMIN — TACROLIMUS 1.5 MG: 1 CAPSULE ORAL at 18:05

## 2023-06-21 RX ADMIN — INSULIN GLARGINE 10 UNITS: 100 INJECTION, SOLUTION SUBCUTANEOUS at 21:51

## 2023-06-21 RX ADMIN — BUMETANIDE 2 MG: 0.25 INJECTION INTRAMUSCULAR; INTRAVENOUS at 14:01

## 2023-06-21 RX ADMIN — BUMETANIDE 2 MG: 0.25 INJECTION INTRAMUSCULAR; INTRAVENOUS at 19:59

## 2023-06-21 RX ADMIN — PREDNISONE 30 MG: 20 TABLET ORAL at 09:04

## 2023-06-21 RX ADMIN — DICLOFENAC SODIUM 4 G: 10 GEL TOPICAL at 09:06

## 2023-06-21 RX ADMIN — Medication 12.5 MG: at 05:50

## 2023-06-21 RX ADMIN — ONDANSETRON 4 MG: 2 INJECTION INTRAMUSCULAR; INTRAVENOUS at 09:03

## 2023-06-21 RX ADMIN — METHOCARBAMOL 500 MG: 500 TABLET ORAL at 09:04

## 2023-06-21 RX ADMIN — SODIUM BICARBONATE 650 MG: 650 TABLET ORAL at 13:28

## 2023-06-21 RX ADMIN — DICLOFENAC SODIUM 4 G: 10 GEL TOPICAL at 19:59

## 2023-06-21 RX ADMIN — SULFAMETHOXAZOLE AND TRIMETHOPRIM 1 TABLET: 400; 80 TABLET ORAL at 19:42

## 2023-06-21 RX ADMIN — ATORVASTATIN CALCIUM 20 MG: 20 TABLET, FILM COATED ORAL at 19:43

## 2023-06-21 RX ADMIN — URSODIOL 250 MG: 250 TABLET, FILM COATED ORAL at 19:42

## 2023-06-21 RX ADMIN — MIDODRINE HYDROCHLORIDE 10 MG: 5 TABLET ORAL at 21:44

## 2023-06-21 RX ADMIN — Medication 3 ML: at 20:48

## 2023-06-21 RX ADMIN — MYCOPHENOLATE MOFETIL 750 MG: 200 POWDER, FOR SUSPENSION ORAL at 09:06

## 2023-06-21 RX ADMIN — MIDODRINE HYDROCHLORIDE 20 MG: 5 TABLET ORAL at 05:50

## 2023-06-21 RX ADMIN — DICLOFENAC SODIUM 4 G: 10 GEL TOPICAL at 13:30

## 2023-06-21 RX ADMIN — METHOCARBAMOL 500 MG: 500 TABLET ORAL at 01:39

## 2023-06-21 RX ADMIN — B-COMPLEX W/ C & FOLIC ACID TAB 1 MG 1 TABLET: 1 TAB at 09:05

## 2023-06-21 RX ADMIN — HEPARIN SODIUM 2150 UNITS/HR: 10000 INJECTION, SOLUTION INTRAVENOUS at 19:40

## 2023-06-21 RX ADMIN — ASPIRIN 325 MG: 325 TABLET ORAL at 09:04

## 2023-06-21 RX ADMIN — SODIUM BICARBONATE 650 MG: 650 TABLET ORAL at 09:04

## 2023-06-21 RX ADMIN — ONDANSETRON 4 MG: 4 TABLET, ORALLY DISINTEGRATING ORAL at 11:54

## 2023-06-21 RX ADMIN — METHOCARBAMOL 500 MG: 500 TABLET ORAL at 13:28

## 2023-06-21 RX ADMIN — HEPARIN SODIUM 2250 UNITS/HR: 10000 INJECTION, SOLUTION INTRAVENOUS at 06:07

## 2023-06-21 RX ADMIN — MIDODRINE HYDROCHLORIDE 15 MG: 5 TABLET ORAL at 13:28

## 2023-06-21 RX ADMIN — ALTEPLASE 2 MG: 2.2 INJECTION, POWDER, LYOPHILIZED, FOR SOLUTION INTRAVENOUS at 10:33

## 2023-06-21 RX ADMIN — TACROLIMUS 1 MG: 5 CAPSULE ORAL at 09:06

## 2023-06-21 RX ADMIN — URSODIOL 250 MG: 250 TABLET, FILM COATED ORAL at 09:05

## 2023-06-21 RX ADMIN — MYCOPHENOLATE MOFETIL 750 MG: 200 POWDER, FOR SUSPENSION ORAL at 18:01

## 2023-06-21 RX ADMIN — ALTEPLASE 2 MG: 2.2 INJECTION, POWDER, LYOPHILIZED, FOR SOLUTION INTRAVENOUS at 13:38

## 2023-06-21 RX ADMIN — Medication 12.5 MG: at 21:44

## 2023-06-21 RX ADMIN — Medication 40 MG: at 09:05

## 2023-06-21 RX ADMIN — METHOCARBAMOL 500 MG: 500 TABLET ORAL at 19:43

## 2023-06-21 RX ADMIN — SODIUM BICARBONATE 650 MG: 650 TABLET ORAL at 19:42

## 2023-06-21 RX ADMIN — Medication 12.5 MG: at 13:28

## 2023-06-21 RX ADMIN — ALLOPURINOL 100 MG: 100 TABLET ORAL at 09:07

## 2023-06-21 ASSESSMENT — ACTIVITIES OF DAILY LIVING (ADL)
ADLS_ACUITY_SCORE: 36
ADLS_ACUITY_SCORE: 38
ADLS_ACUITY_SCORE: 36
ADLS_ACUITY_SCORE: 38
ADLS_ACUITY_SCORE: 36
ADLS_ACUITY_SCORE: 36

## 2023-06-21 NOTE — PROGRESS NOTES
"Calorie Count  Intake recorded for: 06/20  Total Kcals: 188 Total Protein: 3g  Kcals from Hospital Food: 188   Protein: 3g  Kcals from Outside Food (average):0 Protein: 0g  # Meals Ordered from Kitchen: 1 meal  # Meals Recorded: 1 meal (100% Blueberry Muffin)  # Supplements Recorded: No intake recorded    Note: \"1/4 subway club, white pickles, lettuce\" written on ticket, however unable to calculate calories/protein    "

## 2023-06-21 NOTE — PROGRESS NOTES
[facility]   Transplant Nephrology Progress Note  Date of Admission:  6/5/2023  Today's Date: 06/21/2023    Recommendations:  - Decrease midodrine further to 10mg q8h  - continue with bumex 2mg q12h  - will evaluate for iHD tomorrow, no need for urgent HD today        Assessment & Plan   # DDKT (SLK): DGF and was on CRRT from transplant until 6/19. Increasing UOP.Last iHD on 6/19. Good urine output with diuretic. Monitor urine output.    - Baseline Creatinine: ~ TBD   - Proteinuria: Not checked post transplant   - Date DSA Last Checked: Jun/2023      Latest DSA: Low level DSA (<1000 mfi) to CW9,    - BK Viremia: Not checked post transplant   - Kidney Tx Biopsy: Jun 20, 2023; Result:  ATN, no evidence of rejection  - HD Info  Access: Temporary Catheter RIJ, Days: TBD, Length: 3.0 hrs, 2-3L UF Heparin: No, FEDERICO: No, IV Iron: No, Vit D analog: No  -Evaluate daily for iHD needs. 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  Slow trend down in LFTs.  Followed by Transplant Surgery. Follow up US performed for elevated LFTs 2 weeks post surgery     # Immunosuppression: Tacrolimus immediate release (goal 5-8), Mycophenolate mofetil (dose 750 mg every 12 hours) and Prednisone (dose 30 mg daily) due to gout    - Induction with Recent Transplant:  rATG total 4mg/kg, stopped due to sepsis   - Changes: Not at this time. Plan to wean pred down to 5mg daily once tac goal is increased (after renal recovery), higher prednisone dose in setting of gout.     # Infection Prophylaxis:   - PJP: Sulfa/TMP (Bactrim)  - CMV: Valganciclovir (Valcyte);CMV IgG Ab positive  - Thrush: Micafungin (Mycamine)  - Fungal: Micafungin (Mycamine)    # Hypotension, improving: Controlled, BP increasing Goal BP: < 130/80   - Volume status: Moderate hypervolemia  EDW ~ 96.4  kg   - Changes: Yes - decrease midodrine to 10mg q8h. Continue iHD PRN.     -On metoprolol 12.5mg q8h for afib    -continue bumex 2mg IV BID with strict I/Os    # Elevated Blood Glucose: Glucose generally running ~ 140-180s'   - On insulin prn.   - Management as per primary team.    # Anemia in Chronic Renal Disease/Surgery: Hgb: Trend down      FEDERICO: No   - Iron studies: Replete    - Transfusion for Hgb <7 mg/dl, management per primary team    # Mineral Bone Disorder:   - Secondary renal hyperparathyroidism; PTH level: Minimally elevated ( pg/ml)        On treatment: None  - Vitamin D; level: Normal        On supplement: No  - Calcium; level: Normal       On supplement: No  - Phosphorus; level: Normal     On binder: No    # Electrolytes:   - Potassium; level: Low normal        On supplement: No  - Magnesium; level: Normal        On supplement: No  - Bicarbonate; level: Stable low        On supplement: Yes  - Sodium; level: Low    # Paroxysmal A-Fib: Stable heart rate on beta blocker and anticoagulation with IV heparin.   -Continue heparin until he is closer to discharge and then would transition to apixaban    # History of C.diff: Continues with loose stools, but negative C. diff with last check 6/11.    # E. Faecium Bacteremia: Diagnosed on 6/9 BlCx. Treated with vancomycin thru 6/20    # ANCA Vasculitis: S/p Rituximab x2   - Will be on acute GN protocol post transplant.    # Gout: On prednisone 10 mg PO daily PTA. Currently on prednisone 30 with gout flare with plan to eventually wean to prednisone 5 once kidney is working and tac increased >8   -Continue allopurinol 100mg daily     # Transplant History:  Etiology of Kidney Failure: IgA nephropathy and ANCA vasculitis  Tx: Liver Tx (SLK)  Transplant: 6/6/2023 (Liver), 6/6/2023 (Kidney)  Significant changes in immunosuppression: None  Significant transplant-related complications: None    Recommendations were communicated to the primary team verbally      Eran Cisse MD   Visiting Resident Nephrology PGY II   06/21/23 2:41 PM       Physician Attestation   I, Subhash Dutta MD, personally examined and evaluated this patient.  I discussed the patient with the resident/fellow and care team, and agree with the assessment and plan of care as documented in the note on 06/21/23 .      I personally reviewed vital signs, medications, labs, and imaging.    Key findings: Hold HD today, decrease midodrine to 10mg q8h. Continue bumex 2mg IV BID. Biopsy with ATN. No rejection. Consider changing heparin gtt to apixaban prior to discharge.   Subhash Dutta MD  Date of Service (when I saw the patient): 06/21/23          Interval History   Mr Quinones is doing well and feeling well. Tolerated HD well. Today no acute indication for iHD. Overall feeling improved and labs stable. Tolerated the biopsy well. No complications. Hb was low and he will receive a unit of pRBCs. Liver US performed for elevated LFTs yesterday.         Review of Systems   4 point ROS was obtained and negative except as noted in the Interval History.    MEDICATIONS:    allopurinol  100 mg Oral Daily     aspirin  325 mg Oral or Feeding Tube Daily     atorvastatin  20 mg Oral or Feeding Tube QPM     bumetanide  2 mg Intravenous BID     diclofenac  4 g Topical TID     fiber modular  1 packet Per Feeding Tube TID     heparin  3 mL Intracatheter Q24H     insulin aspart  1-12 Units Subcutaneous Q4H     insulin glargine  10 Units Subcutaneous At Bedtime     methocarbamol  500 mg Oral or Feeding Tube Q6H     metoprolol tartrate  12.5 mg Oral or Feeding Tube Q8H KARAN     midodrine  15 mg Oral or Feeding Tube Q8H     multivitamin RENAL  1 tablet Oral or Feeding Tube Daily     mycophenolate  750 mg Oral BID IS     ondansetron  4 mg Oral TID AC    Or     ondansetron  4 mg Intravenous TID AC     pantoprazole  40 mg Per Feeding Tube QAM AC     predniSONE  30 mg Per Feeding Tube Daily     protein modular  1 packet Per Feeding  "Tube TID     sodium bicarbonate  650 mg Oral or Feeding Tube TID     sodium chloride (PF)  10-40 mL Intracatheter Q7 Days     sodium chloride (PF)  3 mL Intracatheter Q8H     sodium chloride (PF)  3 mL Intravenous Q8H     sulfamethoxazole-trimethoprim  1 tablet Oral or Feeding Tube Once per day on      tacrolimus  1.5 mg Oral BID IS     ursodiol  250 mg Oral or Feeding Tube BID     valGANciclovir  450 mg Oral Once per day on     Or     valGANciclovir  450 mg Oral or NG Tube Once per day on        dextrose       heparin 2,100 Units/hr (23 1200)       Physical Exam   Temp  Av.9  F (36.6  C)  Min: 97  F (36.1  C)  Max: 99.2  F (37.3  C)  Arterial Line BP  Min: 60/45  Max: 131/80  Arterial Line MAP (mmHg)  Av mmHg  Min: 52 mmHg  Max: 101 mmHg      Pulse  Av.5  Min: 68  Max: 159 Resp  Av.8  Min: 14  Max: 23  FiO2 (%)  Av.3 %  Min: 40 %  Max: 50 %  SpO2  Av.1 %  Min: 92 %  Max: 100 %    CVP (mmHg): 6 mmHgBP (!) 127/92   Pulse 101   Temp 98.6  F (37  C) (Axillary)   Resp 13   Ht 1.702 m (5' 7\")   Wt 102.9 kg (226 lb 13.7 oz)   SpO2 100%   BMI 35.53 kg/m      Admit Weight: 102.3 kg (225 lb 8 oz)     GENERAL APPEARANCE: alert and no distress  HENT: mouth without ulcers or lesions  RESP: lungs clear to auscultation - no rales, rhonchi or wheezes  CV: regular rhythm, normal rate, no rub, no murmur  EDEMA: trace to 1+ LE edema bilaterally  ABDOMEN: soft, nondistended, mildly tender, bowel sounds normal  MS: extremities normal - no gross deformities noted, no evidence of inflammation in joints, no muscle tenderness  SKIN: no rash but multiple hematomas on several parts of his body including neck, arm.   TX KIDNEY: mild TTP  DIALYSIS ACCESS:  Temporary catheter right internal jugular    Data   All labs reviewed by me.  CMP  Recent Labs   Lab 23  1156 23  0910 23  0410 23  0323 23  0352 23  0344 23  0359 23  0354 " 06/18/23 2003 06/18/23 2000 06/18/23  0749 06/18/23  0342   NA  --   --  130*  --   --  130*  --  136  --  135*   < > 135*   POTASSIUM  --   --  3.6  --   --  3.4  --  3.8  --  4.1   < > 3.8   CHLORIDE  --   --  92*  --   --  95*  --  102  --  101   < > 101   CO2  --   --  23  --   --  22  --  22  --  23   < > 21*   ANIONGAP  --   --  15  --   --  13  --  12  --  11   < > 13   * 157* 204* 220*   < > 170*   < > 161*   < > 148*   < > 108*   BUN  --   --  51.6*  --   --  26.1*  --  21.5  --  20.7   < > 21.4   CR  --   --  2.52*  --   --  1.51*  --  1.07  --  1.06   < > 1.14   GFRESTIMATED  --   --  28*  --   --  51*  --  77  --  78   < > 71   NAZARIO  --   --  8.8  --   --  8.7*  --  8.3*  --  8.2*   < > 8.0*   MAG  --   --  1.8  --   --  1.7  --  2.1  --  2.2   < > 2.2   PHOS  --   --  3.5  --   --  2.6  --  3.7  --  4.0   < > 3.8   PROTTOTAL  --   --  5.3*  --   --  5.4*  --  5.1*  --   --   --  4.7*   ALBUMIN  --   --  2.4*  --   --  2.6*  --  2.6*  --  2.7*   < > 2.3*   BILITOTAL  --   --  1.2  --   --  2.2*  --  1.6*  --   --   --  1.7*   ALKPHOS  --   --  286*  --   --  323*  --  240*  --   --   --  220*   AST  --   --  49*  --   --  88*  --  58*  --   --   --  51*   ALT  --   --  84*  --   --  93*  --  82*  --   --   --  83*    < > = values in this interval not displayed.     CBC  Recent Labs   Lab 06/21/23  0410 06/20/23  0344 06/19/23  0354 06/18/23 2000   HGB 6.8* 7.1* 7.1*  7.1* 7.4*   WBC 17.3* 13.1* 10.5  10.5 12.3*   RBC 2.27* 2.31* 2.35*  2.35* 2.43*   HCT 22.2* 22.4* 23.0*  23.0* 23.2*   MCV 98 97 98  98 96   MCH 30.0 30.7 30.2  30.2 30.5   MCHC 30.6* 31.7 30.9*  30.9* 31.9   RDW 19.1* 18.9* 19.2*  19.2* 19.0*    160 149*  149* 151     INR  Recent Labs   Lab 06/20/23  0344 06/19/23  0354 06/18/23  1512 06/18/23  0343 06/17/23  0854 06/16/23  0424 06/15/23  0842 06/15/23  0417   INR 1.08 1.09 1.11 1.03  --    < >  --  1.03   PTT  --   --  46*  --  40*  --  29 30    < > = values in  this interval not displayed.     ABG  Recent Labs   Lab 06/15/23  1723   PH 7.44   PCO2 36   PO2 112*   HCO3 24   O2PER 2  2      Urine Studies  Recent Labs   Lab Test 06/16/23  1220 06/10/23  1613 06/05/23  1620 04/07/23  1246   COLOR Yellow Orange* Yellow Yellow   APPEARANCE Cloudy* Cloudy* Clear Clear   URINEGLC 150* 30* Negative Negative   URINEBILI Negative Negative Negative Negative   URINEKETONE Negative Negative Negative Negative   SG 1.014 1.021 1.012 1.011   UBLD Large* Large* Moderate* Moderate*   URINEPH 5.5 5.5 5.5 5.5   PROTEIN 70* 100* 10* 10*   NITRITE Negative Negative Negative Negative   LEUKEST Large* Large* Negative Negative   RBCU >182* >182* 2 5*   WBCU 56* 128* 7* 2     No lab results found.  PTH  Recent Labs   Lab Test 05/19/23  0956   PTHI 67*     Iron Studies  Recent Labs   Lab Test 11/22/22  0848   IRON 68   *   IRONSAT 31   HOLA 429*       IMAGING:  All imaging studies reviewed by me.

## 2023-06-21 NOTE — DISCHARGE SUMMARY
[facility]    Discharge Summary  Transplant Surgery    Date of Admission:  6/5/2023  Date of Discharge:  6/25/2023  Discharging Provider: Shannon Delacruz PA-C / Conor Dc MD PhD    Discharge Diagnoses   Active Problems:    Alcoholic cirrhosis of liver with ascites (H)    Psoriatic arthritis (H)    PAF (paroxysmal atrial fibrillation) (H)    Leukocytosis, unspecified type    Tophaceous gout    Moderate protein-calorie malnutrition (H)    Physical deconditioning    Liver transplant recipient (H)    Bacteremia due to Enterococcus    Administration of long-term prophylactic antibiotics    Immunosuppressed status (H)    Ileus, postoperative (H)    Atrial fibrillation with rapid ventricular response (H)    Delayed graft function of kidney    Hypotension    Kidney transplant recipient    Acute post-operative pain    CAD (coronary artery disease)    Severe malnutrition (H)    Steroid-induced hyperglycemia    Procedure/Surgery Information    Procedure date:  06/06/23     Preoperative diagnosis:  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 Liver transplant   2.  Complex reconstruction of the accessory right hepatic artery    3. Lysis of adhesions taking an additional 30 minutes of operating time  For the kidney transplant  see separate operative note.    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 A J-J ureteral stent was placed.  The kidney had 3 renal arteries.  Kidney was placed intra-abdominal.  2. Kidney allograft  preparation on Back Table  3.  Placement of Prevena wound VAC as patient had obesity BMI greater than 35.  4.  The details of liver transplant on separate note    Surgeon:  ROOSEVELT HILL      Procedure date:  23    Preoperative diagnosis:  s/p SLK with concern for flow in kidney graft    Procedure:  1. Ex lap, washout, closure, and intra-operative US of kidney graft     Non-operative procedures:  23 Small bowel feeding tube and bridle placement  23 Small bowel feeding tube reposition  6/10/23 Arterial line placement right axillary artery  23 Non-tunneled CVC placement  6/15/23 Central line placement  23 PICC placement  23 US guided RLQ transplant kidney biopsy    History of Present Illness   Damion Quinones is a 67 year old male with PMHx of decompensated alcohol + hemochromatosis (homozygous H63D) cirrhosis complicated by variceal bleeding s/p TIPS (10/1/2022) and hepatic encephalopathy + CKD (IgA nephropathy + ANCA vasculitis) s/p simultaneous liver kidney transplant on 2023. RTOR on  with concern for low flow in the renal graft - ex-lap, washout, closure with healthy appearing graft with intact arterial and venous flow.    Hospital Course       s/p DBD simultaneous liver kidney transplant on 2023 with complex reconstruction of accessory right hepatic artery:  * RTOR on  with concern for low flow in the renal graft - ex-lap, washout, closure with healthy appearing graft with intact arterial and venous flow.   - 23 Liver ultrasound with doppler: low RI right and left HA again seen, no significant change. 2 new fluid collections, possible hematoma.   Stent: No biliary stent in place. Ursodiol 250 mg BID.  Vascular ppx:  mg daily     Liver studies elevated, stable. Monitor MWF     H/o CKD related IgA nephropathy and ANCA vasculitis s/p  donor renal transplant on 2023 with ureteral/J stent placement, delayed graft function:   Last renal  US with doppler 6/14 with faster than expected iliac vein velocity above the anastomosis may represent edema and elevated superior arcuate artery resistive index. Renal biopsy 6/20/2023: ATN, no evidence of rejection  - Renal replacement therapy: CRRT 6/14-6/18. Intermittent HD on 6/19.   HD line in-place.   - No plan for renal replacement therapy today  - Diuretics: Bumex 2 mg PO BID     Immunosuppression:  Induction: Steroid taper and Thymoglobulin 400 mg (4 mg/kg) complete.   Maintenance:   -  mg BID  - Tacrolimus 2 mg BID. Goal 5-8 given slow renal recovery.  - Resumed PTA prednisone for rheumatoid arthritis (see below). Goal to wean down to 5 mg prednisone for chronic use upon discharge (will start wean outpatient).  Infection prophylaxis: PCP (bactrim), viral (Valcyte x 12 weeks)    Transplant coordinator: Cindy Clay (Liver), Angelita Torres (Kidney) [phone]  Donor type: DBD  DSA at time of transplant:  Yes CW9 6/5/23  Ureteral stent: Yes  Biliary stent: Yes  CMV:  Donor - / Recipient +  EBV:  Donor + / Recipient +  Induction: Thymo 3.9 mg/kg (400 mg) and steroid taper.    Neuro/Psych:  Alcohol use disorder: Continue sober supports post transplant. Continue complete sobriety.  Acute post operative pain, gout pain:  Controlled with low dose oxycodone PRN. Minimal use.    Hematology:   Acute on chronic anemia: Last transfused 6/17. Hgb stable 7-8 without any s/sx of bleeding.   RIJ clot: US 6/15 with partially occlusive inferior R internal jugular thrombus (previously partially occlusive thrombus in right axillary resolved). Continue warfarin (goal INR 1.5-2). Continue heparin low intensity gtt until INR > 1.5.     Cardiology:  Coronary artery disease: Continue  mg daily and atorvastatin 20 mg daily.   Paroxysmal atrial fibrillation: Cardiology consulted. Continue metoprolol and warfarin (INR goal 1.5-2).      GI/Nutrition:   Severe malnutrition in the context of acute illness: Nutrition  consulted. NJ removed 6/22 with adequate PO intake. Continue Regular diet with supplements.     Endocrine:   Steroid/TF induced hyperglycemia: HgA1c 6.1% (6/6/2023). Continue Lantus and sliding scale Novolog on discharge. Carb coverage added with steroid taper. Patient will need DM education if BG not decreased after steroid taper. Would consult endocrine team for recommendations for discharge given no history of DM or steroid hyperglycemia.      Rheumatology:  Psoriatic arthritis: Prior to admission was on prednisone 10 mg daily. Continue prednisone.   Gout: Tophaceous gout noted on right foot, s/p prednisone 30 mg x 3 days. Continue prednisone as recommended by rheumatology (short taper then prednisone 10 mg daily) and allopurinol.      MSK:  Physical deconditioning: PT/OT. Transferred to TCU for ongoing rehabilitation.      Infectious disease:  Hepatitis B s Ag +: Noted to be positive pre-transplant on 6/5/23, but not previously positive (1/26/2023). HBV DNA negative on 6/5/23. No clear at risk behavior. Repeat Hep B s Ag was negative and HBV DNA not detected; suspect 6/5/2023 HBsAg positivity was a false positive.       Resolved Problems:  Hypotension: 6/10 Echo: EF 55-60%. Intermittently required Vasopressors - now off (s/p 6/6-6/12, 6/15-6/17). Midodrine 20 mg Q8H discontinued 6/22.   Hypoxia: Likely related to fluid overload and deconditioning. Resolved with diuresis and renal replacement therapy.   Encephalopathy: Altered mental status was first noted 6/11/2023. Neurology consulted. Head CT without evidence of acute intra-cranial pathology. MRI/MRA - small area of diffusion restriction on DWI without correlation on ADC. EEG consistent with severe encephalopathy, but no seizures. Etiology of encephalopathy likely multifactorial: worsening uremia and recent bacteremia (although clearance of bacteremia has not resulted to improvement in mental status). No sedating medications; no recent opioids. Encephalopathy  has improved and patient's mental status is back to baseline in the setting of improving uremia as of 6/16/2023 in the setting of uremia improvement.   Enterococcus Faecium bacteremia: Treatment completed 6/19.   *6/9 blood cultures positive for enterococcus faecium. No vegetations noted on TTE. Urine culture 6/10/23 without any growth. ID consulted.   * Blood cultures from 6/10, 6/11, 6/12, 6/13, and 6/16 with no growth to date  * Antibiotics: Vancomycin 6/12-6/20, linezolid 6/10-6/12, Zosyn 6/10-6/12.   Leukocytosis: Afebrile. CT chest 6/16 with bibasilar atelectasis + small bilateral pleural effusion. Patchy densities in the left upper lobe anterolaterally. Infectious workup negative to date. Breathing comfortably on ambient air.     Discharge Disposition   Discharged to home   Condition at discharge: Stable    Final pathology results:   Case Report   Surgical Pathology Report                         Case: [id number]                                   Authorizing Provider:  Chad Clifton MD   Collected:           06/06/2023 03:14 PM           Ordering Location:     [facility]                 Received:            06/06/2023 05:18 PM           Pathologist:           Jeanie Dimas MD                                                            Specimens:   A) - Gallbladder, Donor gallbladder                                                                  B) - Liver, Native liver and gallbladder                                                   Final Diagnosis   A. GALLBLADDER, DONOR CHOLECYSTECTOMY:   No significant morphologic abnormality      B. LIVER (1130 gm)/ GALLBLADDER; EXPLANTED AT TRANSPLANTATION:  Hepatocellular carcinoma; size = 2.8 cm:   -Moderately differentiated, located in right lobe, segment 5   -Negative for vascular invasion or extrahepatic extension   -AJCC/TNM stage pT1b (see also synoptic data)   Advanced background cirrhosis, inactive (Laennec fibrosis stage 4C):   -Compatible with  "nonalcohol steatohepatitis, now burnt-out   -Bile ducts are present in normal numbers and architecture  Gallbladder: No significant morphologic abnormality     Case Report   Surgical Pathology Report                         Case: [id number]                                   Authorizing Provider:  Lolis Bermudez     Collected:           06/20/2023 01:55 PM                                  MD [place]                                                                     Ordering Location:     [facility]     Received:            06/20/2023 02:09 PM                                  [address] [place]                                                             Pathologist:           Milton Abernathy MD                                                               Specimen:    Kidney, Transplanted, Kidney biopsy, transp w/EM, IF                                       Final Diagnosis   A. kidney allograft biopsy (needle), 2 weeks post-transplantation:     Focal acute tubular necrosis     Mild peritubular capillaritis, associated with no other significant signs of active antibody-mediated rejection (g0 ptc1 v0 c4d0)      No significant signs of acute cell-mediated rejection (i0 t0 v0)      Chronic changes of the parenchyma, including:   - no global glomerulosclerosis   - focal tubular atrophy and interstitial fibrosis (<5 of the cortex)   - mild arterial and arteriolar sclerosis       (ct0 ci0 cv1 ah0 mm0 cg0)     Primary Care Physician   Gary Serrano    /83 (BP Location: Left leg)   Pulse 100   Temp 97.7  F (36.5  C) (Oral)   Resp 17   Ht 1.702 m (5' 7\")   Wt 98.5 kg (217 lb 2.5 oz)   SpO2 98%   BMI 34.01 kg/m    General Appearance: Comfortable, no acute distress  Skin: generalized bruising, skin tear on R forearm with gout nodule, dressing changes on R foot (daily)  Heart: Atrial fibrillation   Lungs: Breathing comfortably on ambient air  Abdomen: Obese, surgical scar (liver + kidney) - clean, " dry and intact.  Extremities: 2+ edema in upper and lower extremities bilaterally  Right foot dressing clean,  Neurologic: A&Ox4.     Consultations This Hospital Stay   SOCIAL WORK IP CONSULT  NUTRITION SERVICES ADULT IP CONSULT  PHYSICAL THERAPY ADULT IP CONSULT  OCCUPATIONAL THERAPY ADULT IP CONSULT  CARDIOLOGY GENERAL ADULT IP CONSULT  NEPHROLOGY KIDNEY/PANCREAS TRANSPLANT ADULT IP CONSULT  NURSING TO CONSULT FOR VASCULAR ACCESS CARE IP CONSULT  PHARMACY CRRT IP CONSULT  PHARMACY CRRT IP CONSULT  PHARMACY CRRT IP CONSULT  NUTRITION SERVICES ADULT IP CONSULT  OCCUPATIONAL THERAPY ADULT IP CONSULT  PHYSICAL THERAPY ADULT IP CONSULT  PHARMACY IP CONSULT  SOT MEDICATION HISTORY IP PHARMACY CONSULT  PHARMACY CRRT IP CONSULT  CARE MANAGEMENT / SOCIAL WORK IP CONSULT  NUTRITION SERVICES ADULT IP CONSULT  PHARMACY IP CONSULT  NURSING TO CONSULT FOR VASCULAR ACCESS CARE IP CONSULT  PHARMACY TO DOSE VANCO  CARDIOLOGY GENERAL ADULT IP CONSULT  INFECTIOUS DISEASE TRANSPLANT SOT ADULT IP CONSULT  CARDIOLOGY GENERAL ADULT IP CONSULT  PHARMACY TO DOSE VANCO  PHARMACY/NUTRITION TO START AND MANAGE TPN  NURSING TO CONSULT FOR VASCULAR ACCESS CARE IP CONSULT  NURSING TO CONSULT FOR VASCULAR ACCESS CARE IP CONSULT  NURSING TO CONSULT FOR VASCULAR ACCESS CARE IP CONSULT  PHARMACY TO DOSE VANCO  SPEECH LANGUAGE PATH ADULT IP CONSULT  INTERVENTIONAL RADIOLOGY ADULT/PEDS IP CONSULT  PHARMACY CRRT IP CONSULT  NURSING TO CONSULT FOR VASCULAR ACCESS CARE IP CONSULT  PHARMACY CRRT IP CONSULT  VASCULAR ACCESS FOR PICC PLACEMENT ADULT IP CONSULT  INTERVENTIONAL RADIOLOGY ADULT/PEDS IP CONSULT  VASCULAR ACCESS FOR PICC PLACEMENT ADULT IP CONSULT  NUTRITION SERVICES ADULT IP CONSULT  PHARMACY IP CONSULT  PHARMACY IP CONSULT  INTERVENTIONAL RADIOLOGY ADULT/PEDS IP CONSULT  INTERVENTIONAL RADIOLOGY ADULT/PEDS IP CONSULT  PHARMACY TO DOSE WARFARIN  PHYSICAL MEDICINE & REHAB ASSESSMENT FOR REHAB PLACEMENT ADULT IP CONSULT  PHARMACY IP  CONSULT  RHEUMATOLOGY IP CONSULT  DIABETES EDUCATION IP CONSULT  NUTRITION SERVICES ADULT IP CONSULT  PHYSICAL THERAPY ADULT IP CONSULT  OCCUPATIONAL THERAPY ADULT IP CONSULT    Time Spent on this Encounter   I have spent greater than 30 minutes on this discharge.    Discharge Orders   Discharge Medications   Current Discharge Medication List      START taking these medications    Details   albuterol (PROAIR HFA/PROVENTIL HFA/VENTOLIN HFA) 108 (90 Base) MCG/ACT inhaler Inhale 2 puffs into the lungs every 6 hours as needed for wheezing  Qty: 18 g    Comments: Pharmacy may dispense brand covered by insurance (Proair, or proventil or ventolin or generic albuterol inhaler)  Associated Diagnoses: Liver transplant recipient (H)      allopurinol (ZYLOPRIM) 100 MG tablet Take 1 tablet (100 mg) by mouth daily    Associated Diagnoses: Acute gouty arthritis      aspirin (ASA) 325 MG tablet 1 tablet (325 mg) by Oral or Feeding Tube route daily    Associated Diagnoses: Liver transplant recipient (H)      bisacodyl (DULCOLAX) 10 MG suppository Place 1 suppository (10 mg) rectally daily as needed for constipation    Associated Diagnoses: Liver transplant recipient (H); Administration of long-term prophylactic antibiotics      diclofenac (VOLTAREN) 1 % topical gel Apply 4 g topically 3 times daily    Associated Diagnoses: Liver transplant recipient (H)      !! insulin aspart (NOVOLOG PEN) 100 UNIT/ML pen Inject 1-12 Units Subcutaneous every 4 hours  Qty: 15 mL    Associated Diagnoses: Liver transplant recipient (H)      !! insulin aspart (NOVOLOG PEN) 100 UNIT/ML pen Inject 1-7 Units Subcutaneous 3 times daily (with meals) DOSE:  1 units per 15 grams of carbohydrate.  Only chart total amount of units given.  Do not give if pre-prandial glucose is less than 60 mg/dL.  If given at mealtime, administer within 30 minutes of start of meal.  Qty: 15 mL    Associated Diagnoses: Liver transplant recipient (H)      !! insulin aspart  (NOVOLOG PEN) 100 UNIT/ML pen Inject 1-7 Units Subcutaneous Take with snacks or supplements for high blood sugar  Qty: 15 mL    Associated Diagnoses: Liver transplant recipient (H)      insulin glargine (LANTUS PEN) 100 UNIT/ML pen Inject 10 Units Subcutaneous At Bedtime  Qty: 15 mL    Comments: If Lantus is not covered by insurance, may substitute Basaglar or Semglee or other insulin glargine product per insurance preference at same dose and frequency.    Associated Diagnoses: Liver transplant recipient (H)      magnesium oxide (MAG-OX) 400 MG tablet Take 1 tablet (400 mg) by mouth daily    Associated Diagnoses: Liver transplant recipient (H)      methocarbamol (ROBAXIN) 500 MG tablet 1 tablet (500 mg) by Oral or Feeding Tube route 4 times daily as needed for muscle spasms    Associated Diagnoses: Liver transplant recipient (H)      metoprolol tartrate (LOPRESSOR) 25 MG tablet 0.5 tablets (12.5 mg) by Oral or Feeding Tube route every 8 hours    Associated Diagnoses: Atrial fibrillation with rapid ventricular response (H)      mycophenolate (GENERIC EQUIVALENT) 250 MG capsule Take 3 capsules (750 mg) by mouth 2 times daily    Associated Diagnoses: Liver transplant recipient (H)      nystatin (MYCOSTATIN) 256100 UNIT/ML suspension Take 5 mLs (500,000 Units) by mouth 4 times daily    Associated Diagnoses: Liver transplant recipient (H)      oxyCODONE (ROXICODONE) 5 MG tablet 0.5 tablets (2.5 mg) by Oral or Feeding Tube route every 4 hours as needed for moderate pain  Refills: 0    Associated Diagnoses: Liver transplant recipient (H)      pantoprazole (PROTONIX) 40 MG EC tablet Take 1 tablet (40 mg) by mouth every morning (before breakfast)    Associated Diagnoses: Liver transplant recipient (H)      polyethylene glycol (MIRALAX) 17 GM/Dose powder Take 17 g by mouth daily  Qty: 510 g    Associated Diagnoses: Liver transplant recipient (H)      simethicone (MYLICON) 80 MG chewable tablet 1 tablet (80 mg) by Oral or  Feeding Tube route every 6 hours as needed for cramping    Associated Diagnoses: Liver transplant recipient (H)      sulfamethoxazole-trimethoprim (BACTRIM) 400-80 MG tablet 1 tablet by Oral or Feeding Tube route three times a week    Associated Diagnoses: Liver transplant recipient (H)      tacrolimus (GENERIC EQUIVALENT) 1 MG capsule Take 2 capsules (2 mg) by mouth 2 times daily    Associated Diagnoses: Liver transplant recipient (H)      ursodiol (ACTIGALL) 250 MG tablet Take 1 tablet (250 mg) by mouth 2 times daily    Associated Diagnoses: Liver transplant recipient (H)      valGANciclovir (VALCYTE) 450 MG tablet Take 1 tablet (450 mg) by mouth every other day    Comments: Titrate to 900 mg daily dose. CMV PPX (SLK).  Associated Diagnoses: Liver transplant recipient (H)      Warfarin Therapy Reminder Take 1 each by mouth See Admin Instructions    Associated Diagnoses: Liver transplant recipient (H); Ileus, postoperative (H)       !! - Potential duplicate medications found. Please discuss with provider.      CONTINUE these medications which have CHANGED    Details   atorvastatin (LIPITOR) 20 MG tablet 1 tablet (20 mg) by Oral or Feeding Tube route every evening    Associated Diagnoses: Liver transplant recipient (H)      bumetanide (BUMEX) 2 MG tablet Take 1 tablet (2 mg) by mouth 2 times daily    Associated Diagnoses: Liver transplant recipient (H)      multivitamin RENAL (RENAVITE RX/NEPHROVITE) 1 tablet tablet 1 tablet by Oral or Feeding Tube route daily    Associated Diagnoses: Liver transplant recipient (H)      ondansetron (ZOFRAN ODT) 4 MG ODT tab Take 1 tablet (4 mg) by mouth every 6 hours as needed for nausea or vomiting    Associated Diagnoses: Liver transplant recipient (H)      !! predniSONE (DELTASONE) 10 MG tablet Take 3 tablets (30 mg) by mouth daily    Associated Diagnoses: Acute gouty arthritis      !! predniSONE (DELTASONE) 10 MG tablet Take 3 tabs by mouth on 6/26, then 2 tabs daily x 3 days  (6/27-6/29).    Associated Diagnoses: Acute gouty arthritis      !! predniSONE (DELTASONE) 10 MG tablet 1 tablet (10 mg) by Per Feeding Tube route daily    Associated Diagnoses: Liver transplant recipient (H); Acute gouty arthritis      warfarin ANTICOAGULANT (COUMADIN) 3 MG tablet Take 1 tablet (3 mg) by mouth once for 1 dose    Comments: At 6 pm.  Associated Diagnoses: Liver transplant recipient (H); Ileus, postoperative (H)       !! - Potential duplicate medications found. Please discuss with provider.      STOP taking these medications       aspirin 81 MG EC tablet Comments:   Reason for Stopping:         Calcium Carbonate-Vitamin D 500-3.125 MG-MCG TABS Comments:   Reason for Stopping:         folic acid (FOLVITE) 1 MG tablet Comments:   Reason for Stopping:         lactulose (CHRONULAC) 10 GM/15ML solution Comments:   Reason for Stopping:         metoprolol succinate ER (TOPROL XL) 25 MG 24 hr tablet Comments:   Reason for Stopping:         midodrine (PROAMATINE) 2.5 MG tablet Comments:   Reason for Stopping:         omeprazole 20 MG tablet Comments:   Reason for Stopping:         spironolactone (ALDACTONE) 25 MG tablet Comments:   Reason for Stopping:         XIFAXAN 550 MG TABS tablet Comments:   Reason for Stopping:         zinc gluconate 50 MG tablet Comments:   Reason for Stopping:         zinc oxide (DESITIN) 40 % paste Comments:   Reason for Stopping:                  Follow Up and recommended labs and tests    Bring an updated medication list to all appointments.     Transplant labs (CBC, BMP, hepatic panel, mag, phos, and tacrolimus levels (12 hours after administration)) MWF.  Follow up with your transplant surgery within 7 days after discharge from rehab. Transplant coordinator to arrange  Follow up with transplant hepatology per protocol.  Follow up with transplant nephrology per protocol.   Follow up with primary care provider in 2-4 weeks. (Pt to schedule)  Ureteral stent in place.  Your  coordinator will arrange for cystoscopy. To be done 4-6 weeks post transplant.  Call scheduling at [phone] if you have not heard about your appointments within 48 hours after discharge.    Contact coordinator prior to discharge:  Transplant Coordinator [phone]     Reason for your hospital stay    Patient underwent  donor liver and kidney transplant.     Glucose monitor nursing POCT    Before meals and at bedtime     Intake and output    Every shift     Daily weights    Call Provider for weight gain of more than 2 pounds per day or 5 pounds per week.     Bladder scan    PRN urinary retention     IV access    Hemodialysis and PICC. Remove PICC prior to discharge from TCU.     Activity - Up with assistive device     Full Code     Nutrition Services Adult IP Consult    Reason:  malnutrition     Physical Therapy Adult Consult    Evaluate and treat as clinically indicated.    Reason:  deconditioning     Occupational Therapy Adult Consult    Evaluate and treat as clinically indicated.    Reason:  deconditioning     Contact Isolation     Fall precautions     Diet    Follow this diet upon discharge: Orders Placed This Encounter      Snacks/Supplements Adult: Other; allow pt to order supplements prn; With Meals      Snacks/Supplements Adult: Magic Cup; Between Meals      Regular Diet Adult         Data   Most Recent 3 CBC's:  Recent Labs   Lab Test 23  0516 23  0731 23  0449   WBC 10.6 10.2 10.3   HGB 7.6* 7.2* 7.0*   MCV 97 96 97    281 252      Most Recent 3 BMP's:  Recent Labs   Lab Test 23  1203 23  0816 23  0516 23  0833 23  0731 23  0800 23  0449   NA  --   --  134*  --  132*  --  132*   POTASSIUM  --   --  3.9  --  3.9  --  4.0   CHLORIDE  --   --  88*  --  88*  --  90*   CO2  --   --  29  --  27  --  25   BUN  --   --  89.2*  --  83.6*  --  80.9*   CR  --   --  2.98*  --  3.07*  --  3.21*   ANIONGAP  --   --  17*  --  17*  --  17*    NAZARIO  --   --  9.2  --  9.0  --  8.9   * 103* 131*   < > 122*   < > 106*    < > = values in this interval not displayed.     Most Recent 2 LFT's:  Recent Labs   Lab Test 06/25/23  0516 06/24/23  0731   AST 58* 61*   * 100*   ALKPHOS 429* 393*   BILITOTAL 0.9 1.0     Most Recent INR's and Anticoagulation Dosing History:  Anticoagulation Dose History  More data exists       Latest Ref Rng & Units 6/16/2023 6/17/2023 6/18/2023 6/19/2023   Recent Dosing and Labs   warfarin ANTICOAGULANT (COUMADIN) tablet 1 mg - - - - -   INR 0.85 - 1.15 1.10  1.14  1.11  1.03  1.09            6/20/2023 6/22/2023 6/23/2023   Recent Dosing and Labs   warfarin ANTICOAGULANT (COUMADIN) tablet 1 mg - 1 mg, $Given -   INR 1.08  1.04  1.03      Most Recent 3 Troponin's:No lab results found.  Most Recent Cholesterol Panel:  Recent Labs   Lab Test 11/22/22  0848   CHOL 285*   *   HDL 79   TRIG 96     Most Recent 6 Bacteria Isolates From Any Culture (See EPIC Reports for Culture Details):No lab results found.  Most Recent TSH, T4 and A1c Labs:  Recent Labs   Lab Test 06/06/23  2108 05/19/23  0956 04/07/23  1150 12/16/22  1652 11/22/22  0848   TSH  --   --  3.37   < > 4.91*   T4  --   --   --   --  1.09   A1C 5.1   < >  --   --  5.3    < > = values in this interval not displayed.     Results for orders placed or performed during the hospital encounter of 06/05/23   XR Chest 2 Views    Narrative    EXAM: Chest radiograph 6/5/2023 2:30 PM    HISTORY: 67 years Male pre transplant screen.     COMPARISON: Chest radiograph 5/11/2023. Chest CT 5/11/2023.    TECHNIQUE: Frontal and lateral views of the chest.    FINDINGS:   Left IJ catheter projecting over the low SVC. Embolization coil  material within the left upper quadrant. TIPS shunt is visualized over  the right upper quadrant. The cardiomediastinal silhouette is stable  compared to 5/11/2023 exam. Prominent pulmonary vasculature. Trachea  is midline. There is no focal pulmonary  consolidation. No pneumothorax  or significant pleural effusion. Normal lung volumes. Grossly  symmetric hemidiaphragms. Visualized upper abdomen is unremarkable. No  acute or suspicious osseous abnormalities. Soft tissues are  unremarkable.      Impression    IMPRESSION:   1.  No interstitial or airway disease visualized.  2.  Mild prominence of the pulmonary vasculature likely secondary to  pulmonary hypertension.  3.  Stable positioning of support devices.    I have personally reviewed the examination and initial interpretation  and I agree with the findings.    KEV CHEN MD         SYSTEM ID:  [id number]   XR Abdomen Port 1 View    Narrative    TEMPORARY 6/6/2023 7:48 PM    CLINICAL HISTORY: Intraoperative radiograph per Dr. Clifton.  Per  surgeon, no missing foreign object. There is normal count.     COMPARISON: CT 1/23/2023    FINDINGS: Postsurgical changes of the abdomen status post liver  transplant with numerous surgical staples overlying the abdomen.  Surgical drain projecting over the right upper quadrant. Additional  postsurgical changes in the right lower quadrant status post renal  transplant with surgical staples and clips. Ureteral stent and  surgical drain in the right lower quadrant. No obstructive bowel gas  pattern. No pneumatosis or portal venous gas. Embolization material  seen within the left upper abdomen. Degenerative changes of the  lumbosacral spine.       Impression    IMPRESSION:     Postsurgical changes of liver  and renal transplant, as described. No  radiopaque surgical instrument identified on single frontal projection  radiograph reviewed.    Finding was identified on 6/6/2023 7:48 PM.     Heidi PADGETT in OR 8 was contacted by Dr. Young at 6/6/2023 7:59 PM  and verbalized understanding of the urgent finding.     I have personally reviewed the examination and initial interpretation  and I agree with the findings.    KURTIS JENSEN MD         SYSTEM ID:  [id number]   XR  Chest Port 1 View     Value    Radiologist flags Widened mediastinum, central line positioning (Urgent)    Narrative    EXAM: XR CHEST PORT 1 VIEW  6/6/2023 9:19 PM      HISTORY: central line    COMPARISON: Radiograph 6/5/2023    FINDINGS: Single view of the chest. Endotracheal tube tip projects  over the midthoracic trachea. Enteric tube course outside the field of  view. Right internal jugular approach [place]-Marc catheter tip  projecting over the right pulmonary artery. Right IJ central venous  catheter with tip projecting over the distal SVC. Additional left IJ  approach tunnel line in stable position with tip projecting over the  right atrium.    Trachea is midline. Enlarged cardiomediastinal silhouette.  Atherosclerotic calcifications of the aortic arch. The mediastinum  appears widened when compared to prior radiograph. Streaky perihilar  and bibasilar pulmonary opacities. Layering right-sided pleural  effusion. No appreciable pneumothorax.      Impression    IMPRESSION:   1. The right internal jugular central line/sheath appears to be  positioned in the distal internal jugular vein. Increased mediastinal  widening when compared to 6/5/2023 radiograph. Findings may be  projectional secondary to AP versus PA positioning, however, if  concerned for possible traumatic line placement could consider chest  CT for further assessment.  2. Layering right-sided pleural effusion.  3. Perihilar and bibasilar opacities, likely atelectasis/edema.    [Urgent Result: Widened mediastinum, central line positioning]    Finding was identified on 6/6/2023 9:21 PM.     Dr. Best was contacted by Dr. Puente at 6/6/2023 9:34 PM and  verbalized understanding of the urgent finding.     I have personally reviewed the examination and initial interpretation  and I agree with the findings.    KURTIS JENSEN MD         SYSTEM ID:  [id number]   US Liver Transplant    Narrative    EXAMINATION: US LIVER TRANSPLANT, 6/6/2023 10:34 PM      COMPARISON: None.    HISTORY: Caval replacement    TECHNIQUE:  Gray-scale, color Doppler and spectral flow analysis.    FINDINGS:   There is no ascites.    Liver:   The liver demonstrates normal homogeneous echotexture. No  evidence of a focal hepatic mass.     Bile Ducts: Both the intra- and extrahepatic biliary system are of  normal caliber.  The common bile duct measures 4 mm in diameter.    Gallbladder: The gallbladder is surgically absent.    Kidneys:   Right kidney:  The right kidney demonstrates normal echotexture with  no evidence of a shadowing stone, focal mass or hydronephrosis.   8.6  cm in long axis dimension.  Left kidney: Because of overlying bowel gas was not visualized.    Pancreas: Pancreas is not visualized.    Spleen:  The spleen is normal in size, measuring 11.1 cm.    Visualized portions of the aorta are unremarkable.    LIVER DOPPLER:  Splenic vein:  Patent continuous normal antegrade direction flow  towards the liver, 24 cm/sec.  Extrahepatic portal vein:  Patent continuous antegrade flow, 51  cm/sec.  Portal vein at anastomosis: Patent continuous antegrade flow, 65  cm/sec.  Intrahepatic portal vein:  Patent continuous antegrade flow, 69  cm/sec.  Right portal vein flow is antegrade, measuring 36 cm/sec.  Left portal vein flow is antegrade, measuring 71 cm/sec.    Inferior vena cava: patent with flow toward the heart throughout..  IVC above anastomosis:  59 cm/sec.  IVC at anastomosis:  87 cm/sec.  Intrahepatic IVC:  61 cm/sec.  Extrahepatic IVC:  110 cm/sec.    Right, mid, left hepatic veins: Patent with flow towards the inferior  vena cava.    Extrahepatic hepatic artery: Low resistance waveform with flow towards  the liver. 54 cm/sec with resistive index 0.40.  Right hepatic artery: 54 cm/sec with resistive index 0.35.  Left hepatic artery: 61 cm/sec with resistive index 0.45.      Impression    Impression:   1.  Unremarkable grayscale appearance of transplanted liver.  2.  Low  resistant waveforms throughout the hepatic raises concern for  more central occlusive disease of the transplant artery. Velocities  and upstroke however are more normal. No focal area of increased  velocity identified. Short-term follow-up recommended.  3.  The left kidney and pancreas are not visualized on this  examination due to overlying bowel gas.      I have personally reviewed the examination and initial interpretation  and I agree with the findings.    HEBERT MAYERS MD         SYSTEM ID:  [id number]   US Renal Transplant with Doppler    Narrative    EXAMINATION: US RENAL TRANSPLANT,  6/6/2023 10:11 PM     COMPARISON: None.    HISTORY: 1 vein, 3 arteries    TECHNIQUE:  Grey-scale, color Doppler and spectral flow analysis.     FINDINGS:  The transplant kidney is located in the right lower quadrant, and  measures 10.0 cm length. On color Doppler evaluation there is also  near minimal flow throughout the superior, mid, and inferior pole. No  focal lesions. No hydronephrosis. No perinephric fluid collection.    Renal artery flow:   37 cm/sec peak systolic at hilum with a resistive index of 1.00. There  are tardus parvus waveforms.  Not visualized at the anastomosis.     Lower Pole Arcuate artery:   Not visualized.     Mid pole Arcuate artery:  Not visualized.     Upper Pole Arcuate artery:  Not visualized.     Renal Vein Flow:  70 cm/sec at hilum.   43 cm/sec at anastomosis.    Iliac artery flow:  Not visualized above anastomosis.  148 cm/sec peak systolic below anastomosis.    Iliac vein flow:  Iliac vein is patent above the anastomosis.      Impression    IMPRESSION:  New right lower quadrant kidney transplant with very minimal flow by  color Doppler within the hepatic artery.  Minimal flow in the  transplant venous system. These findings are concerning for arterial  occlusion or severe stenosis.     Finding was identified on 6/6/2023 10:11 PM.     Dr. Francisco BELLO was contacted by Dr. Young at 6/6/2023 10:26  PM and  verbalized understanding of the urgent finding.     I have personally reviewed the examination and initial interpretation  and I agree with the findings.    HEBERT MAYERS MD         SYSTEM ID:  [id number]   XR Abdomen Port 1 View    Narrative    EXAM: XR ABDOMEN PORT 1 VIEW  6/6/2023 9:21 PM     HISTORY:  OG tube       TECHNIQUE: Single frontal radiograph of the abdomen    COMPARISON:  6/6/2023    FINDINGS:     Gastric tube side hole and distal tip are seen within the stomach.  Postsurgical changes of liver transplant. Right upper quadrant drain.  Embolization material overlying the left upper quadrant. Partially  visualized [place]-Marc catheter. No obstructive bowel gas pattern. No  pneumatosis or portal venous gas.      Impression    IMPRESSION: Gastric tube sidehole and distal tip projects over the  stomach.     I have personally reviewed the examination and initial interpretation  and I agree with the findings.    KURTIS JENSEN MD         SYSTEM ID:  [id number]   US Renal Transplant with Doppler    Narrative    EXAMINATION: US RENAL TRANSPLANT,  6/7/2023 1:19 AM     COMPARISON: 6/6/2023    HISTORY: kidney tx    TECHNIQUE:  Grey-scale, color Doppler and spectral flow analysis.    FINDINGS:  The transplant kidney is located in the right lower quadrant. Limited  grayscale evaluation is unremarkable    There are 3 renal arteries, with 2 anastomoses.    Renal artery flow:   99, 43, 56 cm/sec peak systolic at hilum.  136, 125 peak systolic at anastomosis.  Arcuate artery resistive indices (upper to lower): 0.71, 0.44, 0.56    Renal Vein Flow:  28 cm/sat hilum.   40 cm/s at anastomosis.      Impression    IMPRESSION: Patent Doppler evaluation of the transplant renal  arteries, vein, and arcuate arteries. Low resistive index in the mid  arcuate artery. Attention on follow-up recommended.    I have personally reviewed the examination and initial interpretation  and I agree with the findings.    HEBERT MAYERS MD          SYSTEM ID:  [id number]   US Renal Transplant with Doppler    Narrative    EXAMINATION: US RENAL TRANSPLANT,  6/7/2023 11:29 AM     COMPARISON: Earlier same day renal transplant ultrasound, renal  transplant ultrasound 6/6/2023    HISTORY: Evaluate flows post kidney transplant    TECHNIQUE:  Grey-scale, color Doppler and spectral flow analysis.    FINDINGS:  Examination is limited due to right lower quadrant wound VAC,  positioning of the renal transplant, and patient body habitus.    The transplant kidney is located right lower quadrant, and measures 10  cm. Parenchyma is of normal thickness and echogenicity. No focal  lesions. No hydronephrosis. No perinephric fluid collection.    There are three renal arteries, all of which show normal waveform and  velocities at the hilum. The renal artery anastomosis is not  visualized.    Renal artery 1 flow:   14 cm/s peak systolic at hilum and RI 0.80.   Anastomosis is not confidently identified.    Renal artery 2 flow:   15 cm/s peak systolic at hilum and RI 0.78.  Anastomosis is not confidently identified.    Renal artery 3:  40 cm/s peak systolic velocity at the hilum and RI 0.82    Arcuate artery resistive indices (upper to lower): 0.71, 0.72, 0.69    Renal Vein Flow:  5-11 cm/s at hilum.   123 cm/s at anastomosis. Previously 40 cm/s.    Iliac artery flow:  52 cm/s peak systolic above anastomosis.  44 cm/s peak systolic below anastomosis.    Iliac vein flow:  Patent above and below the anastomosis.      Impression    IMPRESSION:   Limited exam due to right lower quadrant wound VAC, kidney position,  and patient body habitus.    1. Patent visualized renal transplant vasculature. The anastomosis of  the renal arteries are not confidently identified however there is  normal low resistant arterial waveforms in the three renal arteries at  the hilum and in the arcuate arteries suggesting no significant  proximal stenosis.  2. Upper limits of normal resistive indices likely  representing  postoperative edema.  3. Slightly elevated velocity in the renal vein at the anastomosis,  likely due to postoperative edema. Attention on follow-up.  4. Normal sonographic appearance of the right lower quadrant renal  transplant without perinephric fluid collection.    I have personally reviewed the examination and initial interpretation  and I agree with the findings.    ADRIANA BARROS MD         SYSTEM ID:  [id number]   US Liver Transplant Follow Up    Narrative    EXAMINATION: US LIVER TRANSPLANT FOLLOW UP, 6/7/2023 11:29 AM     COMPARISON: 6/6/2023    HISTORY: Evaluate flows, rising liver studies    TECHNIQUE:  Gray-scale, color Doppler and spectral flow analysis.    FINDINGS:   Limited evaluation secondary to body habitus.    There is no ascites.    Liver:   The liver demonstrates normal homogeneous echotexture. No  evidence of a focal hepatic mass.     Bile Ducts: Visualized intra- and extrahepatic biliary system are of  normal caliber.  The common bile duct is not well-seen    Gallbladder: The gallbladder is surgically absent.    Pancreas: Pancreas is not visualized.    Visualized portions of the aorta are unremarkable.    LIVER DOPPLER:  Splenic vein:  Patent continuous normal antegrade direction flow  towards the liver, 24 cm/sec.  Extrahepatic portal vein:  Patent continuous antegrade flow, 50  cm/sec.  Portal vein at anastomosis: Patent continuous antegrade flow, 48  cm/sec.  Intrahepatic portal vein:  Patent continuous antegrade flow, 40  cm/sec.  Right portal vein flow is antegrade, measuring 26 cm/sec.  Left portal vein flow is antegrade, measuring 10 cm/sec.    Inferior vena cava: patent with flow toward the heart throughout..  IVC at anastomosis:  105 cm/sec.  Intrahepatic IVC:  40 cm/sec.  Extrahepatic IVC:  36 cm/sec.    Right, mid, left hepatic veins: Patent with flow towards the inferior  vena cava.    Extrahepatic hepatic artery: Low resistance waveform with flow towards  the liver.  31 cm/sec with resistive index 0.3.  Right hepatic artery: 40 cm/sec with resistive index 0.32.  Left hepatic artery: 74 cm/sec with resistive index 0.46.      Impression    Impression:   1.  Limited unremarkable grayscale appearance of transplanted liver.  2.  Persistent low resistant waveforms and low resistive indices  throughout the hepatic artery system is concerning for ischemia.  Velocities and upstroke however are stable. No focal area of increased  velocity identified. Continued short-term follow-up recommended        I have personally reviewed the examination and initial interpretation  and I agree with the findings.    ADRIANA BARROS MD         SYSTEM ID:  [id number]   XR Abdomen Port 1 View    Narrative    Exam: XR ABDOMEN PORT 1 VIEW, 6/7/2023 1:59 PM    Indication: Verify small bowel feeding tube bedside placement    Comparison: 6/6/2023    Findings:   Portable supine radiograph of the abdomen. Left hemiabdomen excluded  from field-of-view. Small bowel feeding tube terminates over the  expected location distal pylorus/proximal duodenum. Right upper  quadrant surgical drain in stable position. Partially visualized  gastric tube. Abdominal wall skin staples.  Scattered air filled loops of bowel in the abdomen, nonspecific.  Portal venous gas or pneumatosis. Multilevel degenerative changes of  spine. Partially visualized stent in the pelvis over the expected  location of the bladder.      Impression    Impression: Small bowel feeding tube terminates over the expected  location of the distal pylorus/proximal duodenum, consider  advancement.    I have personally reviewed the examination and initial interpretation  and I agree with the findings.    RIKI OSPINA DO         SYSTEM ID:  [id number]   XR Chest Port 1 View    Narrative    EXAM: XR CHEST PORT 1 VIEW  6/8/2023 4:33 PM      HISTORY: R IJ rewired    COMPARISON: Radiograph 6/6/2023, 623.    FINDINGS: Single view of the chest. Enteric tube courses  outside the  field of view and appears to be coiled within the fundus of the  stomach. Stable positioning of left IJ dual-lumen CVC with tip  projecting over the right atrium. Right internal jugular central  venous catheter with tip projecting over the low SVC.    Trachea is midline. Enlarged cardiomediastinal silhouette. Small  bilateral pleural effusions. No pneumothorax. Mixed perihilar and  bibasilar pulmonary opacities      Impression    IMPRESSION:   1. Right internal jugular central venous catheter with tip projecting  over the low SVC. No pneumothorax.  2. Perihilar and bibasilar pulmonary opacities, increased compared to  6/6/2023 radiograph. Findings likely increased atelectasis/edema.  3. Small bilateral pleural effusions.    I have personally reviewed the examination and initial interpretation  and I agree with the findings.    HEBERT MAYERS MD         SYSTEM ID:  [id number]   XR Abdomen Port 1 View    Narrative    Exam: XR ABDOMEN PORT 1 VIEW, 6/9/2023 2:05 PM    Indication: Verify small bowel feeding tube bedside placement    Comparison: X-ray abdomen 6/7/2023, multiple priors.    Findings:   AP supine portable radiograph of the chest. Partial exclusion of the  left hemiabdomen from the field-of-view. Feeding tube tip projects  over the expected location of the proximal duodenum. Stable  positioning of upper quadrant surgical drain. Postsurgical changes  including abdominal wall skin staples, coils projecting over the left  upper quadrant, surgical clips over the epigastrium and left upper  quadrant. Scattered air-filled loops of bowel. No subdiaphragmatic  air, no portal venous gas. The visualized bones are unremarkable.      Impression    Impression: Feeding tube tip projects over the expected location of  the proximal duodenum.    I have personally reviewed the examination and initial interpretation  and I agree with the findings.    DELONTE CANTU MD         SYSTEM ID:  [id number]   XR Abdomen Port 1  View    Narrative    EXAM: XR ABDOMEN PORT 1 VIEW, 6/10/2023 9:40 AM    INDICATION: poss ileus    COMPARISON: Abdominal radiograph 6/9/2023    FINDINGS:  Portable supine view of the abdomen.. Feeding tube tip projects over  the gastric antrum, slightly retracted compared to prior. Multiple  air-filled prominent similar appearing small bowel loops.  Post  surgical changes of the abdomen including abdominal skin staples and  embolization coils and surgical clips in the upper quadrant.  Mild-to-moderate air-filled distention of the stomach.    Stable position partially visualized support devices. Left basilar  opacities.       Impression    IMPRESSION:     1. Air-filled distended small bowel loops, compatible with adynamic  ileus, similar to prior radiograph. Mild-to-moderate air-filled  distention of the stomach.  2. Feeding tube tip projects over the stomach, slightly retracted  compared to prior radiograph     I have personally reviewed the examination and initial interpretation  and I agree with the findings.    KURTIS JENSEN MD         SYSTEM ID:  [id number]   XR Chest Port 1 View    Narrative    EXAM: XR CHEST PORT 1 VIEW  6/10/2023 8:41 AM     HISTORY:  SOB       COMPARISON:  Chest radiograph 6/9/2023    FINDINGS:     Portable semiupright view of the chest. Stable position of right IJ  and left IJ central venous catheters.. Stable enlarged cardiac  silhouette. Left greater than right perihilar and basilar pulmonary  opacities. No pneumothorax. Similar-appearing cardial mediastinal  silhouette. Possible small effusions.    No acute osseous abnormality. Visualized upper abdomen is  unremarkable.        Impression    IMPRESSION:     1. Increased left perihilar and basilar opacities may represent  atelectasis, pulmonary edema or infection with possible component of  loculated fluid  2. stable support devices.    I have personally reviewed the examination and initial interpretation  and I agree with the  findings.    KURTIS JENSEN MD         SYSTEM ID:  [id number]   CT Chest Abdomen Pelvis w/o Contrast    Narrative    CT CHEST ABDOMEN PELVIS W/O CONTRAST 6/10/2023 12:10 PM    History: s/p liver kidney transplant    Comparison: CT abdomen and pelvis 1/23/2023.    Technique: Helical CT acquisition from the lung apices to the pubic  symphysis was obtained without intravenous contrast. Axial, coronal,  and sagittal reconstructions were obtained and reviewed.    Findings:   Devices: Left-sided abdominal drain terminating in the subhepatic  region. Enteric tube and a feeding tube terminating in the stomach.  Left IJV central venous catheter terminating in the right atrium and  right IJV central venous catheter terminating in the SVC. Lux  catheter catheter in the bladder. Nephroureteral stent in the right  lower quadrant transplant kidney.    CHEST:   Lungs: Bilateral small pleural effusions, right more than the left  with adjacent atelectasis. Diffuse bilateral groundglass opacities. No  focal consolidation. No pneumothorax or suspicious pulmonary nodule.  Airways: Central tracheobronchial tree is clear.  Vessels: Main pulmonary artery and aorta are normal in caliber. Normal  three-vessel arch.  Heart: Heart size is normal without pericardial effusion.  Lymph nodes: No suspicious mediastinal lymphadenopathy.  Thyroid: Within normal limits.  Esophagus: Within normal limits    ABDOMEN PELVIS:  Liver: Postsurgical changes of liver transplantation. Small gas  containing perihepatic collection along the medial aspect of the  caudate lobe measuring 2.6 x 1.3 x 3.1 cm (series 7, image 109). Small  subhepatic fluid collection measuring 4.4 x 1.3 x 1.2 cm (series 7,  image 133).    Biliary system: Gallbladder is surgically absent. No intrahepatic or  extrahepatic biliary ductal dilatation.  Pancreas: No focal mass or dilation of the main pancreatic duct.  Stomach: Within normal limits.  Spleen: Within normal limits.  Adrenal  glands: Within normal limits.  Kidneys: Atrophic native kidneys. No hydronephrosis, or stone. Right  lower quadrant transplant kidney is normal in size. Nephroureteral  stent in place with pigtail within the renal pelvis and distal pigtail  in the bladder. Foci of air in the renal collecting system is likely  due to the presence of the stent. No hydronephrosis. No perinephric  fluid collection.  Bladder: Within normal limits. Lux's catheter in situ.  Reproductive organs: Within normal limits.  Colon: Within normal limits.  Small Bowel: Mildly dilated small bowel loops measuring up to 3.9 cm  in diameter without transition point.    Lymph nodes: No intra-abdominal or pelvic lymphadenopathy.  Vasculature: No aortic aneurysm.  Peritoneum: Trace fluid and fat stranding extending from the  perihepatic region, through right perinephric region into the right  paracolic gutter. No pneumoperitoneum.    Soft tissues: Umbilical hernia containing nonobstructed bowel loops.   Bones: No suspicious osseous lesion. Degenerative changes in the  spine. Flowing anterior osteophytes along the thoracic vertebrae  suggestive of diffuse idiopathic skeletal hyperostosis.      Impression    IMPRESSION:   1. Status post liver and kidney transplantation.   2. Small gas-containing fluid collection medial to the caudate lobe  may be postoperative seroma/hematoma but infection cannot be ruled  out.   3. Trace fluid and fat stranding extending from the perihepatic  region, through right perinephric region into the right paracolic  gutter, expected postoperative changes.  4. Mildly dilated small bowel loops without transition point  suggestive of postoperative ileus. Umbilical hernia containing  nonobstructed bowel loops.  5. Bilateral small pleural effusions, right more than the left, with  adjacent atelectasis. Diffuse mild bilateral groundglass opacities may  be due to atelectasis/edema.     ADRIANA BARROS MD         SYSTEM ID:  [id number]   XR  Abdomen Port 1 View    Narrative    XR ABDOMEN PORT 1 VIEW  6/11/2023 2:30 AM      HISTORY: NGT placement    COMPARISON: Chest abdomen pelvis CT and abdominal radiograph 6/10/2023    FINDINGS: AP view of the abdomen. Enteric tube sidehole and tip  project over the stomach. Right upper quadrant drain in place.  Surgical changes of liver transplantation. Improved gaseous distention  of the stomach and slight improvement in multiple mildly distended  loops of small bowel. No definite pneumatosis or free air. Moderate  stool in the right sided colon. Partially visualized lung opacities  and pleural effusions.      Impression    IMPRESSION:   1. Enteric tube sidehole and tip project over the stomach.  2. Improved gaseous distention of the stomach and mild gaseous  distention of small bowel loops, which was most consistent with ileus  on prior CT    I have personally reviewed the examination and initial interpretation  and I agree with the findings.    HEBERT MAYERS MD         SYSTEM ID:  [id number]   US Renal Transplant with Doppler    Narrative    EXAMINATION: US RENAL TRANSPLANT,  6/11/2023 9:58 AM     COMPARISON: Ultrasound renal transplant 6/7/2023    HISTORY: Delayed graft function    TECHNIQUE:  Grey-scale, color Doppler and spectral flow analysis.     FINDINGS:  The transplant kidney is located right lower quadrant, and measures  11.1 cm length. Parenchyma is of normal thickness and echogenicity. No  focal lesions. No hydronephrosis. No perinephric fluid collection.    There are 3 separate renal arteries visualized at the hilum with 2  sites of anastomosis.  Renal artery flow at the hilum:   Superior:  31 cm/sec peak systolic at hilum with RI of 0.73.  Mid:  28 cm/sec peak systolic at hilum with RI of 0.81.  Inferior:  31 cm/sec peak systolic at hilum with RI of 0.84.   Superior:  45 cm/sec peak systolic at anastomosis with RI of 0.80.  Inferior:  89 cm/sec peak systolic at anastomosis with a RI of 0.90.    Lower  Pole Arcuate artery:  0.68 resistive index.     Mid pole Arcuate artery:  0.69 resistive index.     Upper Pole Arcuate artery:  0.69 resistive index.    Renal Vein Flow:  19 cm/sec at hilum.   62 cm/sec at anastomosis.    Iliac artery flow:  71 cm/sec peak systolic above anastomosis.  50 cm/sec peak systolic below anastomosis.    Iliac vein flow:  Patent above and below the anastomosis.    Bladder: Partially compressed with Lux in place.      Impression    IMPRESSION:  1.  Right lower quadrant kidney transplant with patent visualized  renal transplant vasculature. There are persistent borderline elevated  resistive indices throughout the 3 separate renal arteries at the  level of the hilum and anastomosis with normal low resistance arterial  waveforms, may represent sequelae of postoperative edema. Overall,  this is not substantially changed from ultrasound 6/7/2023.  2.  Normal gray scale evaluation of the right lower quadrant renal  transplant without perinephric fluid collections.    I have personally reviewed the examination and initial interpretation  and I agree with the findings.    HEBERT MAYERS MD         SYSTEM ID:  [id number]   XR Abdomen Port 1 View    Narrative    EXAM: XR ABDOMEN PORT 1 VIEW  6/11/2023 3:17 PM     HISTORY:  tube placement       TECHNIQUE: Single frontal radiograph of the abdomen    COMPARISON:  6/11/2023    FINDINGS:   AP portable supine radiograph the abdomen. Gastric tube tip sidehole  positioned in the distal thoracic esophagus with the tip in the  stomach. Postsurgical changes of the abdomen status post liver  transplantation with numerous surgical staples. Nonobstructive bowel  gas pattern. No pneumatosis or portal venous gas.Right upper quadrant  surgical drain. Bibasilar posterior opacities, better appreciated on  CT cannot 6/10/2023.      Impression    IMPRESSION: Gastric tube sidehole positioned in the distal thoracic  esophagus with the distal tip in the stomach. Recommend  advancement.    I have personally reviewed the examination and initial interpretation  and I agree with the findings.    ADRIANA BARROS MD         SYSTEM ID:  [id number]   CT Head w/o Contrast    Narrative    CT HEAD W/O CONTRAST 6/12/2023 6:57 AM    History: stroke   ICD-10:    Comparison: 4/7/2023    Technique: Using multidetector thin collimation helical acquisition  technique, axial, coronal and sagittal CT images from the skull base  to the vertex were obtained without intravenous contrast.   (topogram) image(s) also obtained and reviewed.    Findings: There is no intracranial hemorrhage, mass effect, or midline  shift. Gray/white matter differentiation in both cerebral hemispheres  is preserved. Ventricles are proportionate to the cerebral sulci. The  basal cisterns are clear. Encephalomalacia in the right gyrus rectus.  Patchy periventricular and subcortical white matter hypodensities,  likely from chronic small vessel ischemic disease.    The bony calvaria and the bones of the skull base are normal. The  visualized portions of the paranasal sinuses and mastoid air cells are  clear.      Impression    Impression:  1. No acute intracranial pathology.   2. 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    I have personally reviewed the examination and initial interpretation  and I agree with the findings.    LINA FITZPATRICK MD         SYSTEM ID:  [id number]   MRA Brain ([place] [facility] [place]) wo Contrast    Narrative    EXAM: Brain MRI without contrast, neck MRA (carotids) without  contrast, brain MRA ([place] [facility] [place]) without contrast 6/12/2023  11:12 AM     HISTORY: New LEFT sided weakness and RIGHT gaze deviation.    COMPARISON: Head CT without contrast 4/7/2023, 1/23/2023, 6/12/2023.    TECHNIQUE:   Brain MRI:  Axial and coronal diffusion, axial FLAIR, axial GRE, and  sagittal T1-weighted images were obtained without intravenous  contrast.  Head MRA: 3D time-of-flight MRA of the [place] [facility] [place] was  performed  without intravenous contrast.  Neck MRA:  Limited non contrast 2DTOF images were obtained of the  mid-cervical region.  Three-dimensional reconstructions of the neck and head MRA were  created, which were reviewed by the radiologist..    FINDINGS:    Brain MRI:  There is no mass effect, midline shift, or intracranial hemorrhage.  The ventricles are proportionate to the cerebral sulci.  Diffusion-weighted images are positive for a punctate focus of signal  in the right centrum semiovale, also visible coronal images. Multiple  scattered foci of subcortical and deep cerebral white matter T2  hyperintensity. Stable chronic encephalomalacia involving the right  gyrus rectus.    No suspicious abnormality of the skull marrow signal. Clear paranasal  sinuses. Mastoid air cells are clear. No focal abnormality of the  pituitary gland, sella, skull base and upper cervical spinal  structures on sagittal images. The orbits are normal.    Head MRA:  The posterior communicating, anterior, right middle, and posterior  cerebral arteries are patent. The left distal M1/M2 vessel and  demonstrates a short segment of stenosis. No stenosis or occlusion of  the arteries in the posterior fossa. No arterial aneurysm. Hypoplastic  left A1. Short segment of high-grade stenosis of the supraclinoid  segment of the left internal carotid artery (series 1 image 79). The  right internal carotid artery is patent.    No acute abnormality of the other imaged intracranial, paranasal  sinus, orbital, and/or skull base structures.    Neck MRA:  Neck MRA demonstrates patent major cervical vasculature. No  significant stenosis. The normal distal right internal carotid artery  measures 5 mm. The normal distal left internal carotid artery measures  5 mm.      Impression    IMPRESSION:  1. Punctate focus of likely acute to subacute infarct in the right  centrum semiovale.  2. Short segment stenosis of the supraclinoid left internal carotid  artery.  No occlusion or intracranial arterial aneurysm.  3. Neck MRA demonstrates patent major cervical vasculature without  significant stenosis.    These findings were communicated to Dr. Eran Dooley by Dr. Tanvir Hughes at 6/12/2023 12:13 PM via telephone and understanding was  verbalized.    I have personally reviewed the examination and initial interpretation  and I agree with the findings.    LINA FITZPATRICK MD         SYSTEM ID:  [id number]   MRA Neck (Carotids) wo Contrast    Narrative    EXAM: Brain MRI without contrast, neck MRA (carotids) without  contrast, brain MRA ([place] [facility] [place]) without contrast 6/12/2023  11:12 AM     HISTORY: New LEFT sided weakness and RIGHT gaze deviation.    COMPARISON: Head CT without contrast 4/7/2023, 1/23/2023, 6/12/2023.    TECHNIQUE:   Brain MRI:  Axial and coronal diffusion, axial FLAIR, axial GRE, and  sagittal T1-weighted images were obtained without intravenous  contrast.  Head MRA: 3D time-of-flight MRA of the [place] [facility] [place] was performed  without intravenous contrast.  Neck MRA:  Limited non contrast 2DTOF images were obtained of the  mid-cervical region.  Three-dimensional reconstructions of the neck and head MRA were  created, which were reviewed by the radiologist..    FINDINGS:    Brain MRI:  There is no mass effect, midline shift, or intracranial hemorrhage.  The ventricles are proportionate to the cerebral sulci.  Diffusion-weighted images are positive for a punctate focus of signal  in the right centrum semiovale, also visible coronal images. Multiple  scattered foci of subcortical and deep cerebral white matter T2  hyperintensity. Stable chronic encephalomalacia involving the right  gyrus rectus.    No suspicious abnormality of the skull marrow signal. Clear paranasal  sinuses. Mastoid air cells are clear. No focal abnormality of the  pituitary gland, sella, skull base and upper cervical spinal  structures on sagittal images. The orbits are normal.    Head  MRA:  The posterior communicating, anterior, right middle, and posterior  cerebral arteries are patent. The left distal M1/M2 vessel and  demonstrates a short segment of stenosis. No stenosis or occlusion of  the arteries in the posterior fossa. No arterial aneurysm. Hypoplastic  left A1. Short segment of high-grade stenosis of the supraclinoid  segment of the left internal carotid artery (series 1 image 79). The  right internal carotid artery is patent.    No acute abnormality of the other imaged intracranial, paranasal  sinus, orbital, and/or skull base structures.    Neck MRA:  Neck MRA demonstrates patent major cervical vasculature. No  significant stenosis. The normal distal right internal carotid artery  measures 5 mm. The normal distal left internal carotid artery measures  5 mm.      Impression    IMPRESSION:  1. Punctate focus of likely acute to subacute infarct in the right  centrum semiovale.  2. Short segment stenosis of the supraclinoid left internal carotid  artery. No occlusion or intracranial arterial aneurysm.  3. Neck MRA demonstrates patent major cervical vasculature without  significant stenosis.    These findings were communicated to Dr. Eran Dooley by Dr. Tanvir Hughes at 6/12/2023 12:13 PM via telephone and understanding was  verbalized.    I have personally reviewed the examination and initial interpretation  and I agree with the findings.    LINA FITZPATRICK MD         SYSTEM ID:  [id number]   MR Brain w/o Contrast    Narrative    EXAM: Brain MRI without contrast, neck MRA (carotids) without  contrast, brain MRA ([place] [facility] [place]) without contrast 6/12/2023  11:12 AM     HISTORY: New LEFT sided weakness and RIGHT gaze deviation.    COMPARISON: Head CT without contrast 4/7/2023, 1/23/2023, 6/12/2023.    TECHNIQUE:   Brain MRI:  Axial and coronal diffusion, axial FLAIR, axial GRE, and  sagittal T1-weighted images were obtained without intravenous  contrast.  Head MRA: 3D time-of-flight MRA  of the [place] [facility] [place] was performed  without intravenous contrast.  Neck MRA:  Limited non contrast 2DTOF images were obtained of the  mid-cervical region.  Three-dimensional reconstructions of the neck and head MRA were  created, which were reviewed by the radiologist..    FINDINGS:    Brain MRI:  There is no mass effect, midline shift, or intracranial hemorrhage.  The ventricles are proportionate to the cerebral sulci.  Diffusion-weighted images are positive for a punctate focus of signal  in the right centrum semiovale, also visible coronal images. Multiple  scattered foci of subcortical and deep cerebral white matter T2  hyperintensity. Stable chronic encephalomalacia involving the right  gyrus rectus.    No suspicious abnormality of the skull marrow signal. Clear paranasal  sinuses. Mastoid air cells are clear. No focal abnormality of the  pituitary gland, sella, skull base and upper cervical spinal  structures on sagittal images. The orbits are normal.    Head MRA:  The posterior communicating, anterior, right middle, and posterior  cerebral arteries are patent. The left distal M1/M2 vessel and  demonstrates a short segment of stenosis. No stenosis or occlusion of  the arteries in the posterior fossa. No arterial aneurysm. Hypoplastic  left A1. Short segment of high-grade stenosis of the supraclinoid  segment of the left internal carotid artery (series 1 image 79). The  right internal carotid artery is patent.    No acute abnormality of the other imaged intracranial, paranasal  sinus, orbital, and/or skull base structures.    Neck MRA:  Neck MRA demonstrates patent major cervical vasculature. No  significant stenosis. The normal distal right internal carotid artery  measures 5 mm. The normal distal left internal carotid artery measures  5 mm.      Impression    IMPRESSION:  1. Punctate focus of likely acute to subacute infarct in the right  centrum semiovale.  2. Short segment stenosis of the supraclinoid  left internal carotid  artery. No occlusion or intracranial arterial aneurysm.  3. Neck MRA demonstrates patent major cervical vasculature without  significant stenosis.    These findings were communicated to Dr. Eran Dooley by Dr. Tanvir Hughes at 6/12/2023 12:13 PM via telephone and understanding was  verbalized.    I have personally reviewed the examination and initial interpretation  and I agree with the findings.    LINA FITZPATRICK MD         SYSTEM ID:  [id number]   XR Feeding Tube Placement    Narrative    Feeding tube placement: 6/12/2023 3:33 PM    Comparison: CT chest abdomen and pelvis 6/10/2023.    Indication: Malnutrition. Postsurgical transplant 6/6/2023.    Fluoro time: 1.9 minutes.     Technique: After injection of Xylocaine gel into the left nostril, a  feeding tube was advanced under fluoroscopic guidance.    Findings: The feeding tube was advanced with the tip in the first  portion of the duodenum. A small amount of barium was injected to  demonstrate placement within the small bowel. The feeding tube was  then flushed with water. The feeding tube was secured using nasal  bridle, and was secured at 82 cm at the left nares.      Impression    Impression: Uncomplicated feeding tube placement with tip in the first  portion of the duodenum.    I, ADRIANA BARROS MD, attest that I was immediately available to  provide guidance and assistance during the entirety of the procedure.  The examination was performed portable in the ICU therefore a  radiologist was not directly present during tube placement.    I have personally reviewed the examination and initial interpretation  and I agree with the findings.    ADRIANA BARROS MD         SYSTEM ID:  [id number]   US Renal Transplant with Doppler    Narrative    ULTRASOUND RENAL TRANSPLANT WITH DOPPLER  6/14/2023 9:14 AM    CLINICAL HISTORY: UOP 0 mL overnight and Cr up; please evaluate. Right  lower quadrant renal transplant placed 6/7/2023. By surgical  report,  there were 3 renal arteries. Two were joined to a common patch and  there were 2 renal artery anastomoses. Single renal vein and ureter  per surgical report.    COMPARISONS: Ultrasound renal transplant with Doppler 6/11/2023    REFERRING PROVIDER: SHANE NELSON    TECHNIQUE: Right lower quadrant renal transplant evaluated with  grayscale, color Doppler, and Doppler waveform ultrasound. Transplant  renal vasculature evaluated with color Doppler and Doppler waveform  ultrasound.    Cine clips through the renal transplant were saved in the patient's  record.    FINDINGS:   RIGHT LOWER QUADRANT RENAL TRANSPLANT:         Fluid collections: None         Renal artery:            Hilum, superior: 38/8 cm/s - RI 0.78            Hilum, mid: 24/6 cm/s - RI 0.77            Hilum, inferior: 20/5 cm/s, - RI 0.74              Anastomosis: 144/25 cm/s - RI 0.82         Renal artery - iliac ratio: 1.69         Renal vein:            Hilum: 22 cm/s            Anastomosis: 52 cm/s         Length: 11.0 cm         Arcuate artery resistive index (superior / mid / inferior): 0.82  / 0.67 / 0.80 (previously 0.69 / 0.69 / 0.68)         Parenchyma: Normal. No stone, mass, or hydronephrosis.    Iliac artery:       Above anastomosis: 85/0 cm/s       Below anastomosis: 133 cm/s    Iliac vein:       Above anastomosis: 114 cm/s (previously 71 cm/s)       Below anastomosis: 86 cm/s    Bladder: Not visualized.      Impression    IMPRESSION:   1. Three renal arteries by surgical report. Two were joined to a  common patch and there were 2 renal artery anastomoses. Only 1 of the  arterial anastomoses was visualized today. No renal transplant  arterial or venous stenosis demonstrated.    2. Faster than expected iliac vein velocity above the anastomosis may  represent edema.    3. Elevated superior arcuate artery resistive index.    4. Negative grayscale appearance of the renal transplant.    GUIDELINES:  For native kidneys:        Diagnostic criteria suggestive of > 60% diameter stenosis             Renal artery:             Peak systolic velocity > 180 cm/s             RAR > 3.5             Turbulent flow         Arcuate artery Resistive Index Normal < 0.7    For renal transplants:       Renal transplant peak systolic velocity criteria range from > 180  cm/s to > 400 cm/s       Source suggests using:            Peak systolic velocity > or = 300 cm/s            Spectral broadening            Intraparenchymal arterial acceleration time > or = 0.1 s     Source: Madie G, Karon MAE, David ZAVALA, et al. Screening for  transplant renal artery stenosis: Ultrasound-based stenosis  probability stratification. American Journal of Roentgenology.  2017;209: 5534-2473. [id number]    JACIEL AU MD         SYSTEM ID:  [id number]   IR CVC Non Tunnel Placement > 5 Yrs    Narrative    PROCEDURE: Non-tunneled central venous catheter placement    Procedural Personnel  Attending physician(s): ERYN Welch MD   Fellow physician(s): LOVE Joel MD  Resident physician(s): None  Advanced practice provider(s): None    Procedure Date (mm/dd/yyyy): 6/14/2023    Pre-procedure diagnosis: Delayed graft function  Post-procedure diagnosis: Same  Indication: Performance of hemodialysis  Additional clinical history: None    Complications: No immediate complications.      Impression    IMPRESSION:    Insertion of right-sided external jugular non-tunneled dual-lumen  temporary dialysis catheter, with tip in the expected location of the  cavoatrial junction. Access was initially attempted in right IJ  however vein appears questionably partially thrombosed.     Plan:     The catheter may be used immediately.  _______________________________________________________________    PROCEDURE SUMMARY:  - Venous access with ultrasound guidance  - Non-tunneled central venous catheter insertion with fluoroscopic  guidance  - Additional procedure(s): None    PROCEDURE  DETAILS:    Pre-procedure  Consent: Informed consent for the procedure including risks, benefits  and alternatives was obtained and time-out was performed prior to the  procedure.  Preparation (MIPS): The site was prepared and draped using all  elements of maximal sterile barrier technique including sterile  gloves, sterile gown, cap, mask, large sterile sheet, sterile  ultrasound probe cover, hand hygiene and cutaneous antisepsis with 2%  chlorhexidine.   Medical reason for site preparation exception (MIPS): Not applicable    Anesthesia/sedation  Level of anesthesia/sedation: Moderate sedation (conscious sedation)  Anesthesia/sedation administered by: Independent trained observer  under attending supervision with continuous monitoring of the  patient?s level of consciousness and physiologic status  Total intra-service sedation time (minutes): 38    Access  Local anesthesia was administered. Initially right IJ was evaluated  which appears only partially compressible, however overall poor  visualizaton. Needle access obtained under ultrasound guidance however  unable to advance wire. Ultimately decision made to access R EJ. The  vessel was sonographically evaluated and determined to be patent. Real  time ultrasound was used to visualize needle entry into the vessel and  a permanent image was stored.  Laterality: Right  Vein accessed: External jugular vein  Access technique: Micropuncture set with 21 gauge needle    Catheter placement  The access site was dilated and the catheter was placed into the vein  over a wire  under fluoroscopic guidance.  The catheter tip location  was fluoroscopically verified and a permanent image was stored.. A  sterile dressing was applied.  Catheter placed: Mac  Catheter size (Macedonian): 14  Catheter length (cm): 15  Catheter flush: Heparin (1000 units/mL)  Catheter securement technique: Non-absorbable suture    Radiation Dose  Fluoroscopy time (minutes): 2.2 min    Reference air kerma  (mGy): 14.5   Kerma area product (uGy-m2): 339.6     Additional Details  Additional description of procedure: None  Registry event: V/3/g  Device used: None  Equipment details: None  Unique Device Identifiers: Not available  Specimens removed: None  Estimated blood loss (mL): Less than 10  Standardized report: [email or web address]_v3.1    Attestation  Signer name: GORDON DOMINGUEZ MD  I attest that I was present for the entire procedure. I reviewed the  stored images and agree with the report as written.     XR Chest Port 1 View    Narrative    EXAM: Chest radiograph 6/15/2023 7:51 AM    HISTORY: 67 years Male Ongoing cough with secretions, on CRRT. Kidney  and liver transplant 6/6/2023.    COMPARISON: Chest x-ray 6/10/2023.    TECHNIQUE: Portable AP view of the chest.    FINDINGS:   Right IJ central venous catheter with distal tip overlying the SVC.  Enteric tube crosses the diaphragm and projects out of the field of  view. Embolization material overlying the left upper quadrant.  Surgical clips in the right upper quadrant.    Trachea is midline. Stable enlarged cardiac silhouette size. Distinct  pulmonary vasculature. Continued patchy/hazy perihilar and basilar  interstitial pulmonary opacities, mildly improved compared to prior  chest x-ray on 6/10/2023. No pneumothorax. Trace blunting of the  bilateral costophrenic angles. The visualized upper abdomen is  unremarkable. No suspicious osseous abnormality. The soft tissues  unremarkable.      Impression    IMPRESSION:   1.  Continued, left greater than right, perihilar/basilar pulmonary  opacities, likely represents atelectasis and/or pulmonary edema;  however infection cannot be entirely ruled out.  2.  Stable support devices as described above.  3.  Trace bilateral pleural effusions are grossly unchanged.    I have personally reviewed the examination and initial interpretation  and I agree with the findings.    GIBRAN FRASER MD         SYSTEM ID:  [id number]    US Lower Extremity Venous Duplex Bilateral    Narrative    EXAMINATION: DOPPLER VENOUS ULTRASOUND OF BILATERAL LOWER EXTREMITIES,  6/15/2023 10:44 AM     COMPARISON: Lower extremity venous ultrasound 1/23/2023    HISTORY: Bilateral lower extremity edema    TECHNIQUE:  Gray-scale evaluation with compression, spectral flow and  color Doppler assessment of the deep venous system of both legs from  groin to knee, and then at the ankles.    FINDINGS:  In both lower extremities, the common femoral, femoral, popliteal and  posterior tibial veins demonstrate normal compressibility and blood  flow. Nonvisualization of the deep veins in both calves due to the  subcutaneous edema.      Impression    IMPRESSION:  1.  No evidence of deep venous thrombosis in either of the visualized  lower extremity veins. Nonvisualization of the deep veins in either  calf.  2.  Subcutaneous edema about both ankles extending to the calves.    I have personally reviewed the examination and initial interpretation  and I agree with the findings.    ADRIANA BARROS MD         SYSTEM ID:  [id number]   US Upper Ext Venous Duplex Limited Bilat    Narrative    EXAMINATION: DOPPLER VENOUS ULTRASOUND OF BILATERAL UPPER EXTREMITIES,  6/15/2023 10:45 AM     COMPARISON: Right upper extremity venous ultrasound 3/3/2023    HISTORY: Status post liver and kidney transplant on CRRT.    TECHNIQUE:  Gray-scale evaluation with compression, spectral flow, and  color Doppler assessment of the deep venous system of both upper  extremities.    FINDINGS:  Unchanged partially occlusive echogenic peripheral thrombus in the  inferior right internal jugular vein similar to the comparison venous  ultrasound. The right innominate, subclavian, and axillary veins and  straight normal blood flow and venous waveform. The previous partially  occlusive thrombus in the right axillary vein has resolved.    Regarding the left upper extremity, normal blood flow and waveforms  are  demonstrated in the internal jugular, innominate, subclavian, and  axillary veins.      Impression    IMPRESSION:  1.  Persistent nonocclusive thrombus in the lower right internal  jugular vein similar to the comparison ultrasound on 3/3/2023.  2.  Resolved right axillary venous thrombus.  3.  No deep venous thrombosis of the central left upper extremity  veins.    I have personally reviewed the examination and initial interpretation  and I agree with the findings.    ADRIANA BARROS MD         SYSTEM ID:  [id number]   XR Chest Port 1 View    Narrative    XR CHEST PORT 1 VIEW  6/15/2023 5:21 PM      HISTORY: Confirm LIJ CVC placement    COMPARISON: 6/15/23    FINDINGS: Left IJ CVC tip projects over the expected left innominate  vein with tip overlying the shadow of the aortic knob, please ensure  that this is venous blood return. Recent CT scan review from 6/10/2023  showed no evidence of a left-sided SVC. Right IJ CVC terminates over  the high SVC. Feeding tube courses beyond the field of view. Continued  widened cardiomediastinal silhouette. Stable streaky perihilar and  basilar opacities. No pneumothorax. No substantial pleural effusion.  Coiling material over the left upper quadrant.       Impression    IMPRESSION:   1.Left IJ CVC tip projects over the left innominate vs. aortic arch,  recommend confirming arterial vs venous placement.  2. Stable streaky perihilar/basilar opacities, pulmonary edema and/or  atelectasis.     I have personally reviewed the examination and initial interpretation  and I agree with the findings.    KEV CHEN MD         SYSTEM ID:  [id number]   CT Chest w/o Contrast    Narrative    CT chest without contrast    INDICATION: Post left IJ central venous catheter placement. Confirm  placement location before removal. Assess left innominate versus  aortic arch.    COMPARISON: Plain film yesterday    FINDINGS: No contrast. Included fat appears unremarkable. Feeding tube  terminates in  the distalmost stomach. Right perinephric stranding.  Fluid density along the inferior posterior right hepatic lobe border  may indicate other postprocedural change an possible related small  noninfected fluid collection. Benign gynecomastia bilaterally. Small  bilateral pleural effusions. Bibasilar atelectasis. Heart upper normal  size. Aortic annular and multiple coronary artery calcifications.  Aorta and pulmonary artery calibers are normal.  Pectoralis muscle atrophy bilaterally especially on the left. Other  shoulder muscle atrophy bilaterally.  Bone detail shows diffuse hepatic skeletal hyperostosis with other  lower cervical degenerative disc disease.  Right IJ catheter tip in the right innominate vein.  Left IJ catheter tip it is not arterial. The tip appears to project at  or just through the vascular wall of the left innominate vein ordering  to a very small feeding venous vessel. Consider fluoroscopic guided  contrast injection before manipulation of the catheter to confirm tip  position. Detail of the lungs shows other minimal edema and  atelectasis in the lungs likely associated with the pleural effusions.  Patchy nodular densities in the left upper lobe anterolaterally  indication other groundglass opacities or foci of  edema/atelectasis/infection.  Airway debris in the trachea distally.  Drainage catheter in right side of abdomen also crossing the midline  anterior to the stomach, this catheter is also incompletely imaged at  its left sided aspect. Embolization material in region of gastric  fundus/distal splenic vasculature.      Impression    IMPRESSION: Indeterminate position of tip above left IJ catheter  either in or against the wall of the innominate vein, within a very  small feeding venous vessel or extravascular in the mediastinal soft  tissues. Consider fluoroscopic guided contrast injection before  catheter manipulation. This does not appear arterial.  Small pleural effusions with  associated atelectasis bilaterally.  Subtle left upper lobe patchy subpleural densities may indicate other  foci of edema, atelectasis or infection.  Cardiomegaly. Aortic and coronary vascular calcifications.  Feeding tube tip in gastric pylorus.  Debris in the distal trachea.  Right IJ catheter tip in right innominate vein.  Drainage catheter in the upper abdomen. Fluid adjacent to the  posterior inferior right hepatic border. This area is incompletely  imaged on this chest CT.    KEV CHEN MD         SYSTEM ID:  [id number]   IR PICC Placement > 5 Yrs of Age    Narrative    Procedure 6/16/23:  1. Left upper extremity PICC line placement    History: PICC line requested for central access    Comparison: CT 6/16/23    Staff: Dr. Zahra REYES, LEONORA BARBER MD, attest that I was present in the procedure room  for the entire procedure.    Fellow: Dr. Chang    Monitoring/Medications: Patient received local anesthesia only and was  monitored by the nursing staff under the supervision of the IR  attending. Vital signs remained stable throughout the procedure.    1% lidocaine used for local analgesia.    Fluoro time: 2.9 minutes     Findings/procedure: The left upper extremity was prepped draped in the  usual sterile fashion.  Ultrasound revealed a patent basilic vein, and  the overlying tissues were anesthetized with 5 ml of 1% lidocaine.   Under ultrasound guidance a micropuncture needle was advanced into the  basilic vein. The needle was removed and a 0.018 Nitinol wire was  advanced to the subclavian vein as the wire would not pass centrally,  and a measurement was obtained. The catheter was removed and a 5  Yi peel-away sheath and dilator were advanced.  The wire and  dilator was removed and a 5 Yi, 37 cm long catheter was advanced  over the wire, with its tip lying in the left subclavian vein. The  catheter was secured with stat lock. The catheter aspirated and  flushed freely and was flushed with 2 ml  heparin 100 units per ml..   The site was cleansed and dressed.  The procedure was well tolerated.      Impression    Impression: Ultrasound-guided placement of 5 Setswana, 37 cm, dual lumen   PICC left as midline catheter in left subclavian vein.     Plan: Patient discharged to patient care unit in stable condition.      I have personally reviewed the examination and initial interpretation  and I agree with the findings.    LEONORA BARBER MD         SYSTEM ID:  [id number]   XR Chest Port 1 View    Narrative    EXAM: XR CHEST PORT 1 VIEW  6/16/2023 7:15 PM     HISTORY:  check for pneumothorax after IJ removal       COMPARISON:  6/16/2023, 6/15/2023    FINDINGS:   AP portable semiupright view of the chest. Right IJ sheath in similar  position. Interval removal of left IJ central venous catheter. Enteric  tube seen coursing below diaphragm and out of the field-of-view.  Trachea is midline. Persistent widened cardiomediastinal silhouette.  Stable perihilar and bibasilar streaky pulmonary opacities. Small  bilateral pleural effusion. No appreciable pneumothorax.    No acute osseous abnormality. Embolization coils within the left upper  quadrant.       Impression    IMPRESSION:   1. Interval removal of left IJ central venous catheter without  appreciable pneumothorax.  2. Stable small bilateral pleural effusion and perihilar and bibasilar  pulmonary opacities, favoring atelectasis/edema    I have personally reviewed the examination and initial interpretation  and I agree with the findings.    ADRIANA BARROS MD         SYSTEM ID:  [id number]   XR Chest Port 1 View    Narrative    EXAM: XR CHEST PORT 1 VIEW  6/16/2023 8:43 PM     HISTORY:  CVC removed; Confirm no pneumo or hemothorax       COMPARISON:  6/16/2023    FINDINGS:   AP portable semiupright view of the chest. Right IJ sheath in similar  position. Interval removal of left IJ central venous catheter. Enteric  tube seen coursing below diaphragm and out of the  field-of-view.  Endoscopy clips. Trachea is midline. Mildly increased bibasilar  streaky pulmonary opacities. Persistently widened cardiomediastinal  silhouette. Small bilateral pleural effusion. No appreciable  pneumothorax.    No acute osseous abnormality. Embolization coils and surgical staples  within the left upper quadrant.       Impression    IMPRESSION:   1. Interval removal of left IJ central venous catheter without  appreciable pneumothorax.  2. Small bilateral pleural effusions with increased bibasilar  opacities, favoring atelectasis/edema.  3. Stable widened cardiomediastinal silhouette, dating back to  6/8/2023.    I have personally reviewed the examination and initial interpretation  and I agree with the findings.    ADRIANA BARROS MD         SYSTEM ID:  [id number]   IR Renal Biopsy Right    Narrative    Damion BROWN Quinones   [id number]      [id number]    DAMION BROWN QUINONES  [id number]  1955;  67 years    IR RENAL BIOPSY RIGHT  s/p RLQ renal transplant with slow renal recovery; random transplant  bx requested    -----    PRE-OPERATIVE DIAGNOSIS:  slow renal recovery; status post kidney  transplant    POST-OPERATIVE DIAGNOSIS:  Same    PROCEDURE:  Ultrasound guided transplant kidney biopsy      Impression    IMPRESSION:  Completed ultrasound guided RLQ transplant kidney biopsy.  ?A total of three passes yielded tissue cores collected for further  pathological evaluation. ?Microscopy support present. ?No immediate  complications. ?Dx: slow renal recovery. ?[place]. ?LOCAL    ----------    CLINICAL HISTORY:  slow renal recovery; status post kidney transplant    PERFORMED BY:  FALGUNI Valles PA-C (Interventional  Radiology)    CONSENT:  The patient understood the limitations, alternatives, and  risks of the procedure and agreed to the procedure.  Written informed  consent was obtained and is documented in the patient record.    MEDICATIONS:  1% lidocaine was used for local anesthesia.  Gelatin  foam  plugs were used for hemostasis.    NURSING:  The patient was placed on continuous vital signs monitoring.   Vital signs were monitored by nursing staff under my supervision.    DESCRIPTION:  The patient's low abdomen was prepped and draped in the  usual sterile fashion.  Under ultrasound guidance, the patient's skin,  subcutaneous tissue, and kidney capsule were anesthetized.  With  ultrasound guidance, using a 17/18 gauge coaxial needle system, tissue  specimens were obtained.  Needle was removed and the tract was sealed  with Gelfoam rolls.  Pressure was held the site, cleansed, and sterile  dressing applied.  Images were saved throughout the procedure.    COMPLICATIONS:  No immediate complication; the patient remained stable  throughout the procedure.    ESTIMATED BLOOD LOSS:  Minimal    SPECIMENS:  Per above    -----  [ The physician assistant/associate (PA) who performed this procedure  and signed the above report is licensed to practice in the [facility] pursuant to [place] Statute 147A.09. ]  -----    SHREYAS VOGEL PA-C         SYSTEM ID:  [id number]   US Liver Transplant Follow Up    Narrative    EXAMINATION: US LIVER TRANSPLANT FOLLOW UP, 6/20/2023 8:50 AM     COMPARISON: Liver transplant ultrasound 6/7/2023    HISTORY: Increasing liver studies post transplant. Date of transplant:  6/6/2023    TECHNIQUE:  Gray-scale, color Doppler and spectral flow analysis.    FINDINGS:   There is no ascites.    Liver:   The liver demonstrates normal homogeneous echotexture. No  evidence of a focal hepatic mass. There are two postoperative  collections, measuring 10.8 x 1.3 x 8.5 cm medial to the right hepatic  lobe and 5.5 x 3.8 x 3.3 cm in the left hepatic lobe.    Bile Ducts: The common bile duct is not visualized.    Gallbladder: The gallbladder is surgically absent.    LIVER DOPPLER:  Splenic vein:  Patent continuous normal antegrade direction flow  towards the liver, 48 cm/sec.  Extrahepatic portal vein:   Patent continuous antegrade flow, 31  cm/sec.  Portal vein at anastomosis: Patent continuous antegrade flow, 52  cm/sec.  Intrahepatic portal vein:  Patent continuous antegrade flow, 57  cm/sec.  Right portal vein flow is antegrade, measuring 23 cm/sec.  Left portal vein flow is antegrade, measuring 93 cm/sec.    Inferior vena cava: patent with flow toward the heart throughout..  IVC above anastomosis:  94 cm/sec.  IVC at anastomosis:  156 cm/sec.  Intrahepatic IVC:  58 cm/sec.  Extrahepatic IVC:  77 cm/sec.    Right, mid, left hepatic veins: Patent with flow towards the inferior  vena cava.    Extrahepatic hepatic artery: Low resistance waveform with flow towards  the liver. 66 cm/sec with resistive index 0.62.  Right hepatic artery: 35 cm/sec with resistive index 0.38.  Left hepatic artery: 35 cm/sec with resistive index 0.38.        Impression    Impression:     1.  Patent Doppler evaluation of the liver transplant vasculature.  There is continued low resistive indices in the right and left hepatic  arteries, with post stenotic appearance of the arterial waveform,  concerning for more proximal vascular narrowing/significant stenosis  2.  There are two postoperative fluid collections that are not  definitively seen on prior ultrasound, measuring 10.8 cm medial to the  right hepatic lobe and 5.5 cm in the left hepatic lobe, likely  intraparenchymal, possibly postoperative hematomas/seromas.    I have personally reviewed the examination and initial interpretation  and I agree with the findings.    KURTIS JENSEN MD         SYSTEM ID:  [id number]   Echo Complete     Value    LVEF  55-60%    Narrative    [id number]  [id number]  [id number]  [id number]^DIANE^ELIZA^THAI     [facility],[place]  Echocardiography Laboratory  [address] [place] [id number]     Name: EMILIANA SCHREIBER  MRN: [id number]  : 1955  Study Date: 2023 03:09 PM  Age: 67 yrs  Gender: Male  Patient Location:  [id number]  Reason For Study: Pre-Liver Transplant  Ordering Physician: ELIZA CONTRERAS  Performed By: Abdulaziz Chiu     BSA: 2.2 m2  Height: 68 in  Weight: 228 lb  HR: 84  BP: 132/97 mmHg  ______________________________________________________________________________  Procedure  Echocardiogram with two-dimensional, color and spectral Doppler performed. TDS  - poor windows. Technically difficult study. No IV access - no contrast given.  The patient's rhythm is atrial fibrillation.  ______________________________________________________________________________  Interpretation Summary  Technically difficult study. The patient's rhythm is atrial fibrillation.  Global and regional left ventricular function is normal with an EF of 55-60%.  Global right ventricular function is normal.  No significant valvular abnormalities present.  IVC diameter <2.1 cm collapsing >50% with sniff suggests a normal RA pressure  of 3 mmHg.  No pericardial effusion is present.  No significant changes noted.  ______________________________________________________________________________  Left Ventricle  Global and regional left ventricular function is normal with an EF of 55-60%.  Left ventricular size is normal. Mild concentric wall thickening consistent  with left ventricular hypertrophy is present. Diastolic function not assessed  due to atrial fibrillation.     Right Ventricle  The right ventricle is normal size. Global right ventricular function is  normal.     Atria  Severe left atrial enlargement is present.     Mitral Valve  The mitral valve is normal. Trace mitral insufficiency is present.     Aortic Valve  The aortic valve is tricuspid. Moderate aortic valve calcification is present.  Trace aortic insufficiency is present.     Tricuspid Valve  The tricuspid valve is normal. Trace tricuspid insufficiency is present. The  right ventricular systolic pressure is approximated at 30.7 mmHg plus the  right atrial pressure.     Pulmonic  Valve  The pulmonic valve is normal.     Vessels  The thoracic aorta is normal. IVC diameter <2.1 cm collapsing >50% with sniff  suggests a normal RA pressure of 3 mmHg.     Pericardium  No pericardial effusion is present.     Miscellaneous  No significant valvular abnormalities present.     Compared to Previous Study  No significant changes noted.  ______________________________________________________________________________  MMode/2D Measurements & Calculations  IVSd: 1.8 cm  LVIDd: 4.8 cm  LVIDs: 3.2 cm  LVPWd: 1.6 cm  FS: 33.9 %  LV mass(C)d: 383.7 grams  LV mass(C)dI: 177.6 grams/m2  Ao root diam: 3.2 cm  asc Aorta Diam: 3.8 cm  LVOT diam: 1.9 cm  LVOT area: 2.8 cm2  LA Volume (BP): 159.0 ml     LA Volume Index (BP): 73.6 ml/m2  RWT: 0.68     Doppler Measurements & Calculations  MV E max xander: 84.8 cm/sec  MV dec slope: 723.0 cm/sec2  MV dec time: 0.12 sec  PA acc time: 0.07 sec  TR max xander: 277.0 cm/sec  TR max P.7 mmHg  E/E' av.1  Lateral E/e': 7.2  Medial E/e': 11.1     ______________________________________________________________________________  Report approved by: Cem Fabian 2023 04:37 PM         Echocardiogram Limited     Value    LVEF  55-60%    Narrative    [id number]  [id number]  [id number]  [id number]^NANCY^YISEL     [facility],[place]  Echocardiography Laboratory  [address] [place] [id number]     Name: EMILIANA SCHREIBER  MRN: [id number]  : 1955  Study Date: 06/10/2023 10:36 AM  Age: 67 yrs  Gender: Male  Patient Location: [facility]  Reason For Study: Atrial Fibrillation, CHF  Ordering Physician: YISEL CRUZ  Performed By: Abdulaziz Chiu     BSA: 2.2 m2  Height: 67 in  Weight: 250 lb  HR: 124  BP: 91/59 mmHg  ______________________________________________________________________________  Procedure  Limited Echocardiogram with portions of two-dimensional, color and spectral  Doppler performed. scanned  sitting.  ______________________________________________________________________________  Interpretation Summary  Technically difficult study. The patient's rhythm is atrial fibrillation.  Global and regional left ventricular function is normal with an EF of 55-60%.  Global right ventricular function is normal.  No pericardial effusion is present.  ______________________________________________________________________________  Left Ventricle  Global and regional left ventricular function is normal with an EF of 55-60%.  Mild to moderate concentric wall thickening consistent with left ventricular  hypertrophy is present.     Right Ventricle  Global right ventricular function is normal.     Pericardium  No pericardial effusion is present.     Compared to Previous Study  No significant changes noted.     ______________________________________________________________________________  MMode/2D Measurements & Calculations  IVSd: 1.5 cm  LVIDd: 3.3 cm  LVIDs: 2.4 cm  LVPWd: 1.3 cm     FS: 26.9 %  LV mass(C)d: 149.7 grams  LV mass(C)dI: 67.3 grams/m2  RWT: 0.78     ______________________________________________________________________________  Report approved by: MD Shaggy Castle 06/10/2023 11:49 AM               Lab Results   Component Value Date    LIPASE 59 06/07/2023    LIPASE 149 (H) 01/23/2023    LIPASE 142 (H) 01/15/2023    LIPASE 85 (H) 12/16/2022     Lab Results   Component Value Date    AMYLASE 90 06/07/2023    AMYLASE 153 (H) 06/05/2023

## 2023-06-21 NOTE — PLAN OF CARE
Major Shift Events:  Late Entry.;  Patient alert,O x 4, using call light appropriately. Patient remains weak but Carolyn  And assist with turns.   Patient continues in rate controlled atrial fib, 90-130s.  Midodrine given per schedule  MAPS > 60.  Placed on 2 liters n/c for sleep apnea overnight.  Total output yesterday - 3578 ml between HD and CRRT.  Arms still weeping  But edema improved.  Tube feeds on hold and Heparin gtt off at 0200 per MD.  Plan: Kidney biopsy at 1300.  Possible transfer to floor later today.     For vital signs and complete assessments, please see documentation flowsheets.

## 2023-06-21 NOTE — PLAN OF CARE
Major Shift Events: Patient remains alert, oriented.   VSS.  Heparin gtt restarted at 2000 last evening at 2250 unit(s)/hr.   Hgb 6.8 today.  One unit PRBCs just ordered per transplant.   Patient voided 1550 ml yesterday with assistance of Bumex BID.  Cyclic tube feeds at 2000 to 0800 per order.  Regular diet ordered with Robbie Counts.  3 small BMs overnight.      Plan: Given 1 unit PRBCs followed by Bumex.  Titrate Heparin gtt per 10A levels.  Transfer to floor when able.   For vital signs and complete assessments, please see documentation flowsheets.     Plan:   For vital signs and complete assessments, please see documentation flowsheets.

## 2023-06-21 NOTE — PROGRESS NOTES
Major Shift Events:   AOX4. Denies pain. Pt PICC not drawing back. Alteplase ordered by team. Pt eating better today. Heparin gtt remains on. Provider adjusting dosage. Pt working with therapies. 2 BM this shift. Using urinal.     Plan: Continue to work with therapies. Transfer off unit when bed available.     For vital signs and complete assessments, please see documentation flowsheets.

## 2023-06-21 NOTE — PROGRESS NOTES
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 RTOR on  with concern for low flow in the renal graft - ex-lap, washout, closure with healthy appearing graft with intact arterial and venous flow.     s/p DBD simultaneous liver kidney transplant on 2023 with complex reconstruction of accessory right hepatic artery.   * RTOR on  with concern for low flow in the renal graft - ex-lap, washout, closure with healthy appearing graft with intact arterial and venous flow.      Liver studies elevated, decreased today. Alk phos 240 -> 323->286 and total bilirubin 1.6->2.2->1.2. AST and ALT 84 and 89 respectively. Continue daily hepatic panel.     -23 Liver ultrasound with doppler d/t elevated alk phos and TB. Low RI right and left HA again seen, no significant change. 2 new fluid collections, possible hematoma/seromas.     Stent: No biliary stent in place. Ursodiol 250 mg BID.  Vascular ppx: ASA 325mg daily      H/o CKD related IgA nephropathy and ANCA vasculitis s/p  donor renal transplant on 2023 with ureteral/J stent placement, delayed graft function. Nephrology consulted + following. Latest renal US with doppler  with faster than expected iliac vein velocity above the anastomosis may represent edema and elevated superior arcuate artery resistive index.  Restarted on CRRT given worsening renal dysfunction, uremia, and encephalopathy -. Temporary HD line in-place. iHD on .     Cr increased 1.5-> 2.5. K stable. UOP increasing, 1.5L yesterday and 685 ml thus far today.     - Continue bumex 2mg IV BID per nephrology  - Follow up kidney biopsy on 2023 given slow  renal function recovery, results expected today.  - No plan for CRRT or HD today  - Lux removal; bladder scan protocol. Monitor UO and missed voids.     Metabolic acidosis: Continue sodium bicarbonate per nephrology.   Hyperphosphatemia: Hold calcium acetate 667 mg q6h    Immunosuppressed status secondary to medications:   Induction: Steroid taper and Thymoglobulin 400 mg (4 mg/kg) complete.   Maintenance:    -  mg BID  - Tacrolimus 1mg BID; goal 5-8 given slow renal recovery  - Resumed PTA prednisone for rheumatoid arthritis (see below). Goal to wean down to 5 mg prednisone for chronic use.     Hematology:   Acute on chronic anemia: Hgb stable 7-8 without any s/sx of bleeding Last transfused 6/17. Hgb 6.8. Recheck 7.4.   Leukocytosis: Afebrile. CT chest 6/16 with bibasilar atelectasis + small bilateral pleural effusion. Patchy densities in the left upper lobe anterolaterally. Infectious workup negative to date. Breathing comfortably on ambient air.     Cardiology:  Coronary artery disease: Continue  mg daily and atorvastatin 20 mg daily.      Paroxysmal atrial fibrillation:  Cardiology consulted.              * Metoprolol 12.5 mg q8h + metoprolol 5mg IV PRN for sustained tachycardia > 120.               * Continuous rate heparin gtt ordered. Monitor heparin 10A q6hrs. Goal 0.15-2 due to concern for bleeding. Provider will adjust heparin rate.    * Plan for long term anti-coagulation TBD.        Hypotension: 6/10 Echo: EF 55-60%.              * Vasopressors - now off; s/p 6/6-6/12, 6/15-6/17    * Midodrine 20 mg Q8H    Hypoxia, RESOLVED: Likely related to fluid overload and deconditioning.   - Diuresis and renal replacement therapy as above     GI/Nutrition:   Severe malnutrition in the context of acute illness: Nutrition consulted. TF via NJ at 40cc/hr from 8p to 8a + 30cc q4 hour free water flush. Regular diet with supplements. Calorie counts.    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 Monitor for urinary retention. Record UO and missed voids.     Neurologic + MSK:  Encephalopathy,RESOLVED: Altered mental status was first noted 6/11/2023. Neurology consulted. Head CT without evidence of acute intra-cranial pathology. MRI/MRA - small area of diffusion restriction on DWI without correlation on ADC. EEG consistent with severe encephalopathy, but no seizures. Etiology of encephalopathy likely multifactorial: worsening uremia and recent bacteremia (although clearance of bacteremia has not resulted to improvement in mental status). No sedating medications; no recent opioids    Encephalopathy has improved and patient's mental status is back to baseline in the setting of improving uremia as of 6/16/2023 in the setting of uremia improvement.   - Delirium precautions     Acute post operative pain:               * Methocarbamol 500mg q4h PRN              * Oxycodone 2.5-5mgm q4h PRN     Deconditioning/Weakness: OT and PT optimization     Alcohol use disorder: Continue sober supports post transplant. Continue complete sobriety.    Infectious disease:  Hepatitis B s Ag +: Noted to be positive pre-transplant on 6/5/23, but not previously positive (1/26/2023). HBV DNA negative on 6/5/23. No clear at risk behavior. Repeat Hep B s Ag was negative and HBV DNA not detected; suspect 6/5/2023 HBsAg positivity was a false positive.     Enterococcus Faecium bacteremia: Treatment  completed 6/19.   6/9 blood cultures positive for enterococcus faecium. No vegetations noted on TTE. Urine culture 6/10/23 without any growth. ID consulted.   * Blood cultures from 6/10, 6/11, 6/12, 6/13, and 6/16 with no growth to date  * Antibiotics: Vancomycin 6/12-6/20; s/p linezolid 6/10-6/12. S/p empiric zosyn 6/10-6/12     Prophylaxis: Mechanical DVT ppx (heparin as above), fall, GI (PPI BID), fungal (micafungin x 14 days), CMV (valganciclovir, CMV IgG Ab +), PJP (bactrim)     Disposition: Transfer to  when bed is available. Continue transplant education. Daily PT/OT    GEORGE/Fellow/Resident Provider: LUCIO Malik [id number]    Faculty: Dr. Conor Dc  _________________________________________________________________    Interval History:   Overnight events: No acute events overnight. Afebrile  - UOP 1.5L yesterday. No HD since 6/19.  - Stool output in the last 24 hours: 2  - Ongoing pain in feet, right > left  - Nausea and stomach upset; somewhat managed with PRN zofran  - Stood with assistance    ROS:   A 10-point review of systems was negative except as noted above.    Meds:    allopurinol  100 mg Oral Daily     aspirin  325 mg Oral or Feeding Tube Daily     atorvastatin  20 mg Oral or Feeding Tube QPM     bumetanide  2 mg Intravenous BID     [Held by provider] calcium acetate (phos binder)  667 mg Oral or Feeding Tube Q6H     diclofenac  4 g Topical TID     fiber modular  1 packet Per Feeding Tube TID     heparin  3 mL Intracatheter Q24H     insulin aspart  1-12 Units Subcutaneous Q4H     insulin glargine  10 Units Subcutaneous At Bedtime     methocarbamol  500 mg Oral or Feeding Tube Q6H     metoprolol tartrate  12.5 mg Oral or Feeding Tube Q8H KARAN     midodrine  20 mg Oral or Feeding Tube Q8H     multivitamin RENAL  1 tablet Oral or Feeding Tube Daily     mycophenolate  750 mg Oral BID IS     ondansetron  4 mg Oral TID AC    Or     ondansetron  4 mg Intravenous TID AC     pantoprazole  40 mg Per Feeding  "Tube QAM AC     predniSONE  30 mg Per Feeding Tube Daily     protein modular  1 packet Per Feeding Tube TID     sodium bicarbonate  650 mg Oral or Feeding Tube TID     sodium chloride (PF)  10-40 mL Intracatheter Q7 Days     sodium chloride (PF)  3 mL Intracatheter Q8H     sodium chloride (PF)  3 mL Intravenous Q8H     sulfamethoxazole-trimethoprim  1 tablet Oral or Feeding Tube Once per day on Mon Wed Fri     tacrolimus  1 mg ORAL OR NJ TUBE BID IS     ursodiol  250 mg Oral or Feeding Tube BID     valGANciclovir  450 mg Oral Once per day on Mon Fri    Or     valGANciclovir  450 mg Oral or NG Tube Once per day on Mon Fri       Physical Exam:     Admit Weight: 102.3 kg (225 lb 8 oz)    Current vitals:   /85 (BP Location: Right arm)   Pulse 86   Temp 98.5  F (36.9  C) (Axillary)   Resp 12   Ht 1.702 m (5' 7\")   Wt 102.9 kg (226 lb 13.7 oz)   SpO2 93%   BMI 35.53 kg/m      Vital sign ranges:    Temp:  [97.8  F (36.6  C)-98.8  F (37.1  C)] 98.5  F (36.9  C)  Pulse:  [] 86  Resp:  [0-75] 12  BP: ()/(61-85) 124/85  MAP:  [42 mmHg-79 mmHg] 61 mmHg  Arterial Line BP: ()/(26-67) 79/48  SpO2:  [86 %-100 %] 93 %    General Appearance: Lying in bed, comfortable.   Skin: Warm, perfused  Heart: Afib, HR ~80s.  Lungs: Breathing comfortably on ambient air  Abdomen: Obese, surgical scar (liver + kidney) - clean, dry and intact.  : No stauffer.   Extremities: 2+ edema in upper and lower extremities bilaterally  - Tophaceous gout on right foot.   Neurologic: A&Ox4.     Data:   CMP  Recent Labs   Lab 06/21/23  0410 06/21/23  0323 06/20/23  0352 06/20/23  0344 06/19/23  0359 06/19/23  0354   *  --   --  130*  --  136   POTASSIUM 3.6  --   --  3.4  --  3.8   CHLORIDE 92*  --   --  95*  --  102   CO2 23  --   --  22  --  22   * 220*   < > 170*   < > 161*   BUN 51.6*  --   --  26.1*  --  21.5   CR 2.52*  --   --  1.51*  --  1.07   GFRESTIMATED 28*  --   --  51*  --  77   NAZARIO 8.8  --   --  8.7*  -- "  8.3*   ICAW  --   --   --  4.7  --  4.4   MAG 1.8  --   --  1.7  --  2.1   PHOS 3.5  --   --  2.6  --  3.7   ALBUMIN 2.4*  --   --  2.6*  --  2.6*   BILITOTAL 1.2  --   --  2.2*  --  1.6*   ALKPHOS 286*  --   --  323*  --  240*   AST 49*  --   --  88*  --  58*   ALT 84*  --   --  93*  --  82*    < > = values in this interval not displayed.     CBC  Recent Labs   Lab 06/21/23  0410 06/20/23  0344   HGB 6.8* 7.1*   WBC 17.3* 13.1*    160     Liver US w/doppler 6/20/2023  1.  Patent Doppler evaluation of the liver transplant vasculature. There is continued low resistive indices in the right and left hepatic arteries, with post stenotic appearance of the arterial waveform,  concerning for more proximal vascular narrowing/significant stenosis  2.  There are two postoperative fluid collections that are not definitively seen on prior ultrasound, measuring 10.8 cm medial to the right hepatic lobe and 5.5 cm in the left hepatic lobe, likely  intraparenchymal, possibly postoperative hematomas/seromas.

## 2023-06-22 ENCOUNTER — APPOINTMENT (OUTPATIENT)
Dept: OCCUPATIONAL THERAPY | Facility: CLINIC | Age: 68
End: 2023-06-22
Attending: INTERNAL MEDICINE
Payer: COMMERCIAL

## 2023-06-22 ENCOUNTER — APPOINTMENT (OUTPATIENT)
Dept: PHYSICAL THERAPY | Facility: CLINIC | Age: 68
End: 2023-06-22
Attending: INTERNAL MEDICINE
Payer: COMMERCIAL

## 2023-06-22 LAB
ALBUMIN SERPL BCG-MCNC: 2.4 G/DL (ref 3.5–5.2)
ALP SERPL-CCNC: 298 U/L (ref 40–129)
ALT SERPL W P-5'-P-CCNC: 83 U/L (ref 0–70)
ANION GAP SERPL CALCULATED.3IONS-SCNC: 18 MMOL/L (ref 7–15)
AST SERPL W P-5'-P-CCNC: 48 U/L (ref 0–45)
BASOPHILS # BLD AUTO: 0 10E3/UL (ref 0–0.2)
BASOPHILS NFR BLD AUTO: 0 %
BILIRUB DIRECT SERPL-MCNC: 0.68 MG/DL (ref 0–0.3)
BILIRUB SERPL-MCNC: 1 MG/DL
BUN SERPL-MCNC: 73.1 MG/DL (ref 8–23)
CALCIUM SERPL-MCNC: 9 MG/DL (ref 8.8–10.2)
CHLORIDE SERPL-SCNC: 90 MMOL/L (ref 98–107)
CREAT SERPL-MCNC: 3.02 MG/DL (ref 0.67–1.17)
DEPRECATED HCO3 PLAS-SCNC: 23 MMOL/L (ref 22–29)
EOSINOPHIL # BLD AUTO: 0 10E3/UL (ref 0–0.7)
EOSINOPHIL NFR BLD AUTO: 0 %
ERYTHROCYTE [DISTWIDTH] IN BLOOD BY AUTOMATED COUNT: 19 % (ref 10–15)
GFR SERPL CREATININE-BSD FRML MDRD: 22 ML/MIN/1.73M2
GLUCOSE BLDC GLUCOMTR-MCNC: 151 MG/DL (ref 70–99)
GLUCOSE BLDC GLUCOMTR-MCNC: 175 MG/DL (ref 70–99)
GLUCOSE BLDC GLUCOMTR-MCNC: 178 MG/DL (ref 70–99)
GLUCOSE BLDC GLUCOMTR-MCNC: 266 MG/DL (ref 70–99)
GLUCOSE BLDC GLUCOMTR-MCNC: 339 MG/DL (ref 70–99)
GLUCOSE BLDC GLUCOMTR-MCNC: 344 MG/DL (ref 70–99)
GLUCOSE SERPL-MCNC: 156 MG/DL (ref 70–99)
HCT VFR BLD AUTO: 23.8 % (ref 40–53)
HGB BLD-MCNC: 7.4 G/DL (ref 13.3–17.7)
IMM GRANULOCYTES # BLD: 0.6 10E3/UL
IMM GRANULOCYTES NFR BLD: 4 %
INR PPP: 1.04 (ref 0.85–1.15)
LYMPHOCYTES # BLD AUTO: 0.1 10E3/UL (ref 0.8–5.3)
LYMPHOCYTES NFR BLD AUTO: 1 %
MAGNESIUM SERPL-MCNC: 1.7 MG/DL (ref 1.7–2.3)
MCH RBC QN AUTO: 31 PG (ref 26.5–33)
MCHC RBC AUTO-ENTMCNC: 31.1 G/DL (ref 31.5–36.5)
MCV RBC AUTO: 100 FL (ref 78–100)
MONOCYTES # BLD AUTO: 0.4 10E3/UL (ref 0–1.3)
MONOCYTES NFR BLD AUTO: 3 %
NEUTROPHILS # BLD AUTO: 13.2 10E3/UL (ref 1.6–8.3)
NEUTROPHILS NFR BLD AUTO: 92 %
NRBC # BLD AUTO: 0 10E3/UL
NRBC BLD AUTO-RTO: 0 /100
PHOSPHATE SERPL-MCNC: 3.9 MG/DL (ref 2.5–4.5)
PLATELET # BLD AUTO: 235 10E3/UL (ref 150–450)
POTASSIUM SERPL-SCNC: 3.4 MMOL/L (ref 3.4–5.3)
POTASSIUM SERPL-SCNC: 4.5 MMOL/L (ref 3.4–5.3)
PROT SERPL-MCNC: 5.6 G/DL (ref 6.4–8.3)
RBC # BLD AUTO: 2.39 10E6/UL (ref 4.4–5.9)
SODIUM SERPL-SCNC: 131 MMOL/L (ref 136–145)
UFH PPP CHRO-ACNC: 0.21 IU/ML
UFH PPP CHRO-ACNC: 0.22 IU/ML
UFH PPP CHRO-ACNC: 0.25 IU/ML
UFH PPP CHRO-ACNC: 0.27 IU/ML
UFH PPP CHRO-ACNC: <0.1 IU/ML
WBC # BLD AUTO: 14.4 10E3/UL (ref 4–11)

## 2023-06-22 PROCEDURE — 85520 HEPARIN ASSAY: CPT | Performed by: PHYSICIAN ASSISTANT

## 2023-06-22 PROCEDURE — 250N000012 HC RX MED GY IP 250 OP 636 PS 637: Performed by: PHYSICIAN ASSISTANT

## 2023-06-22 PROCEDURE — 0TB03ZX EXCISION OF RIGHT KIDNEY, PERCUTANEOUS APPROACH, DIAGNOSTIC: ICD-10-PCS | Performed by: PHYSICIAN ASSISTANT

## 2023-06-22 PROCEDURE — 250N000011 HC RX IP 250 OP 636: Mod: JZ | Performed by: RADIOLOGY

## 2023-06-22 PROCEDURE — 36415 COLL VENOUS BLD VENIPUNCTURE: CPT | Performed by: TRANSPLANT SURGERY

## 2023-06-22 PROCEDURE — 82310 ASSAY OF CALCIUM: CPT | Performed by: SURGERY

## 2023-06-22 PROCEDURE — 97530 THERAPEUTIC ACTIVITIES: CPT | Mod: GP | Performed by: PHYSICAL THERAPIST

## 2023-06-22 PROCEDURE — 97110 THERAPEUTIC EXERCISES: CPT | Mod: GP | Performed by: PHYSICAL THERAPIST

## 2023-06-22 PROCEDURE — 85610 PROTHROMBIN TIME: CPT | Performed by: NURSE PRACTITIONER

## 2023-06-22 PROCEDURE — 84132 ASSAY OF SERUM POTASSIUM: CPT | Performed by: NURSE PRACTITIONER

## 2023-06-22 PROCEDURE — 250N000013 HC RX MED GY IP 250 OP 250 PS 637: Performed by: TRANSPLANT SURGERY

## 2023-06-22 PROCEDURE — 82248 BILIRUBIN DIRECT: CPT | Performed by: SURGERY

## 2023-06-22 PROCEDURE — 99232 SBSQ HOSP IP/OBS MODERATE 35: CPT | Mod: 24 | Performed by: TRANSPLANT SURGERY

## 2023-06-22 PROCEDURE — 85520 HEPARIN ASSAY: CPT | Performed by: TRANSPLANT SURGERY

## 2023-06-22 PROCEDURE — 120N000002 HC R&B MED SURG/OB UMMC

## 2023-06-22 PROCEDURE — 97535 SELF CARE MNGMENT TRAINING: CPT | Mod: GO

## 2023-06-22 PROCEDURE — 250N000012 HC RX MED GY IP 250 OP 636 PS 637: Performed by: INTERNAL MEDICINE

## 2023-06-22 PROCEDURE — 250N000013 HC RX MED GY IP 250 OP 250 PS 637: Performed by: NURSE PRACTITIONER

## 2023-06-22 PROCEDURE — 250N000011 HC RX IP 250 OP 636: Mod: JZ

## 2023-06-22 PROCEDURE — 36415 COLL VENOUS BLD VENIPUNCTURE: CPT | Performed by: PHYSICIAN ASSISTANT

## 2023-06-22 PROCEDURE — 99233 SBSQ HOSP IP/OBS HIGH 50: CPT | Mod: 24 | Performed by: INTERNAL MEDICINE

## 2023-06-22 PROCEDURE — 84100 ASSAY OF PHOSPHORUS: CPT | Performed by: SURGERY

## 2023-06-22 PROCEDURE — 83735 ASSAY OF MAGNESIUM: CPT | Performed by: SURGERY

## 2023-06-22 PROCEDURE — 97530 THERAPEUTIC ACTIVITIES: CPT | Mod: GO

## 2023-06-22 PROCEDURE — 250N000013 HC RX MED GY IP 250 OP 250 PS 637: Performed by: PHYSICIAN ASSISTANT

## 2023-06-22 PROCEDURE — 250N000013 HC RX MED GY IP 250 OP 250 PS 637: Performed by: INTERNAL MEDICINE

## 2023-06-22 PROCEDURE — 85025 COMPLETE CBC W/AUTO DIFF WBC: CPT | Performed by: SURGERY

## 2023-06-22 PROCEDURE — 36415 COLL VENOUS BLD VENIPUNCTURE: CPT | Performed by: NURSE PRACTITIONER

## 2023-06-22 RX ORDER — ONDANSETRON 2 MG/ML
4 INJECTION INTRAMUSCULAR; INTRAVENOUS EVERY 6 HOURS PRN
Status: DISCONTINUED | OUTPATIENT
Start: 2023-06-22 | End: 2023-06-25 | Stop reason: HOSPADM

## 2023-06-22 RX ORDER — BUMETANIDE 2 MG/1
2 TABLET ORAL
Status: DISCONTINUED | OUTPATIENT
Start: 2023-06-22 | End: 2023-06-24

## 2023-06-22 RX ORDER — ONDANSETRON 4 MG/1
4 TABLET, ORALLY DISINTEGRATING ORAL EVERY 6 HOURS PRN
Status: DISCONTINUED | OUTPATIENT
Start: 2023-06-22 | End: 2023-06-25 | Stop reason: HOSPADM

## 2023-06-22 RX ORDER — WARFARIN SODIUM 1 MG/1
1 TABLET ORAL
Status: COMPLETED | OUTPATIENT
Start: 2023-06-22 | End: 2023-06-22

## 2023-06-22 RX ORDER — POTASSIUM CHLORIDE 750 MG/1
40 TABLET, EXTENDED RELEASE ORAL ONCE
Status: COMPLETED | OUTPATIENT
Start: 2023-06-22 | End: 2023-06-22

## 2023-06-22 RX ADMIN — MYCOPHENOLATE MOFETIL 750 MG: 200 POWDER, FOR SUSPENSION ORAL at 08:09

## 2023-06-22 RX ADMIN — MIDODRINE HYDROCHLORIDE 10 MG: 5 TABLET ORAL at 05:40

## 2023-06-22 RX ADMIN — HEPARIN SODIUM 2000 UNITS/HR: 10000 INJECTION, SOLUTION INTRAVENOUS at 05:39

## 2023-06-22 RX ADMIN — PREDNISONE 30 MG: 20 TABLET ORAL at 08:07

## 2023-06-22 RX ADMIN — URSODIOL 250 MG: 250 TABLET, FILM COATED ORAL at 08:07

## 2023-06-22 RX ADMIN — Medication 3 ML: at 19:40

## 2023-06-22 RX ADMIN — TACROLIMUS 1.5 MG: 1 CAPSULE ORAL at 08:08

## 2023-06-22 RX ADMIN — B-COMPLEX W/ C & FOLIC ACID TAB 1 MG 1 TABLET: 1 TAB at 08:07

## 2023-06-22 RX ADMIN — DICLOFENAC SODIUM 4 G: 10 GEL TOPICAL at 14:23

## 2023-06-22 RX ADMIN — SODIUM BICARBONATE 650 MG: 650 TABLET ORAL at 19:39

## 2023-06-22 RX ADMIN — ASPIRIN 325 MG: 325 TABLET ORAL at 08:07

## 2023-06-22 RX ADMIN — METHOCARBAMOL 500 MG: 500 TABLET ORAL at 08:07

## 2023-06-22 RX ADMIN — DICLOFENAC SODIUM 4 G: 10 GEL TOPICAL at 19:40

## 2023-06-22 RX ADMIN — SODIUM BICARBONATE 650 MG: 650 TABLET ORAL at 14:22

## 2023-06-22 RX ADMIN — POTASSIUM CHLORIDE 40 MEQ: 750 TABLET, EXTENDED RELEASE ORAL at 10:05

## 2023-06-22 RX ADMIN — ALLOPURINOL 100 MG: 100 TABLET ORAL at 08:07

## 2023-06-22 RX ADMIN — HEPARIN SODIUM 2000 UNITS/HR: 10000 INJECTION, SOLUTION INTRAVENOUS at 18:38

## 2023-06-22 RX ADMIN — METHOCARBAMOL 500 MG: 500 TABLET ORAL at 14:22

## 2023-06-22 RX ADMIN — MYCOPHENOLATE MOFETIL 750 MG: 200 POWDER, FOR SUSPENSION ORAL at 18:15

## 2023-06-22 RX ADMIN — URSODIOL 250 MG: 250 TABLET, FILM COATED ORAL at 19:39

## 2023-06-22 RX ADMIN — METHOCARBAMOL 500 MG: 500 TABLET ORAL at 19:39

## 2023-06-22 RX ADMIN — WARFARIN SODIUM 1 MG: 1 TABLET ORAL at 18:15

## 2023-06-22 RX ADMIN — ATORVASTATIN CALCIUM 20 MG: 20 TABLET, FILM COATED ORAL at 19:39

## 2023-06-22 RX ADMIN — BUMETANIDE 2 MG: 2 TABLET ORAL at 08:08

## 2023-06-22 RX ADMIN — METHOCARBAMOL 500 MG: 500 TABLET ORAL at 01:54

## 2023-06-22 RX ADMIN — INSULIN GLARGINE 10 UNITS: 100 INJECTION, SOLUTION SUBCUTANEOUS at 21:27

## 2023-06-22 RX ADMIN — BUMETANIDE 2 MG: 2 TABLET ORAL at 16:17

## 2023-06-22 RX ADMIN — SODIUM BICARBONATE 650 MG: 650 TABLET ORAL at 08:07

## 2023-06-22 RX ADMIN — Medication 12.5 MG: at 14:23

## 2023-06-22 RX ADMIN — DICLOFENAC SODIUM 4 G: 10 GEL TOPICAL at 08:10

## 2023-06-22 RX ADMIN — Medication 40 MG: at 08:09

## 2023-06-22 RX ADMIN — Medication 12.5 MG: at 21:27

## 2023-06-22 RX ADMIN — Medication 12.5 MG: at 05:40

## 2023-06-22 RX ADMIN — TACROLIMUS 1.5 MG: 1 CAPSULE ORAL at 18:15

## 2023-06-22 ASSESSMENT — ACTIVITIES OF DAILY LIVING (ADL)
ADLS_ACUITY_SCORE: 36

## 2023-06-22 NOTE — PLAN OF CARE
Major Shift Events:  Patient alert, oriented, slept better tonight.  VSS with decreased   Midodrine dosage yesterday.  Casey continues to have sleep apnea with O2 saturations dropping to 70s when asleep.  Placed on 2 liters FiO2 overnight.    Cyclic feeds continue for 12 hours, off at 0800.  Patient is net -1021 mls out yesterday  With bumex BID.  Creatinine up to 3.0 this morning.  Nephrology following daily to   Assess dialysis plan. Heparin gtt decreased to 2000 unit(s)/hr per provider order.    Plan: Transfer to floor when bed available.  PT/OT daily.  For vital signs and complete assessments, please see documentation flowsheets.

## 2023-06-22 NOTE — PROGRESS NOTES
[facility]   Transplant Nephrology Progress Note  Date of Admission:  6/5/2023  Today's Date: 06/22/2023    Recommendations:  - discontinue midodrine with stable BP   - convert bumex 2mg q12h to po   - improving urine output, stabilizing kidney function, hold off on HD      Assessment & Plan   # DDKT (SLK): DGF and was on CRRT from transplant until 6/19. Increasing UOP.Last iHD on 6/19. Good urine output with diuretic. Monitor urine output.    - Baseline Creatinine: ~ TBD   - Proteinuria: Not checked post transplant   - Date DSA Last Checked: Jun/2023      Latest DSA: Low level DSA (<1000 mfi) to CW9,    - BK Viremia: Not checked post transplant   - Kidney Tx Biopsy: Jun 20, 2023; Result:  ATN, no evidence of rejection  - HD Info  Access: Temporary Catheter RIJ, Days: TBD, Length: 3.0 hrs, 2-3L UF Heparin: No, FEDERICO: No, IV Iron: No, Vit D analog: No  -No indication for HD today and likely with renal recovery   - will monitor HD need but currently no need to arrange for tunneled line        # Liver Tx (SLK): ESLD secondary to alcoholic cirrhosis and hereditary hemochromatosis.  Slow trend down in LFTs.  Followed by Transplant Surgery.     # Immunosuppression: Tacrolimus immediate release (goal 5-8), Mycophenolate mofetil (dose 750 mg every 12 hours) and Prednisone (dose 30 mg daily) due to gout    - Induction with Recent Transplant:  rATG total 4mg/kg, stopped due to sepsis   - Changes: Not at this time. Plan to wean pred down to 5mg daily once tac goal is increased (after renal recovery), higher prednisone dose in setting of gout.     # Infection Prophylaxis:   - PJP: Sulfa/TMP (Bactrim)  - CMV: Valganciclovir (Valcyte);CMV IgG Ab positive  - Thrush: Micafungin (Mycamine)  - Fungal: Micafungin (Mycamine)    # Hypotension, improving: Controlled, BP increasing Goal BP: < 130/80   - Volume status: Moderate hypervolemia  EDW ~ 96.4 kg   - Changes: Yes - discontinue  midodrine, switch IV bumex to bumex 2mg PO BID     -Continue metoprolol 12.5mg q8h for afib     # Elevated Blood Glucose: Glucose generally running ~ 140-180s'   - On insulin prn.   - Management as per primary team.    # Anemia in Chronic Renal Disease/Surgery: Hgb: Stable, low      FEDERICO: No   - Iron studies: Replete    - Transfusion for Hgb <7 mg/dl, management per primary team    # Mineral Bone Disorder:   - Secondary renal hyperparathyroidism; PTH level: Minimally elevated ( pg/ml)        On treatment: None  - Vitamin D; level: Normal        On supplement: No  - Calcium; level: Normal       On supplement: No  - Phosphorus; level: Normal     On binder: No    # Electrolytes:   - Potassium; level: Low normal        On supplement: No, replete  - Magnesium; level: Normal        On supplement: No  - Bicarbonate; level: Normal        On supplement: Yes  - Sodium; level: Low    # Paroxysmal A-Fib: Stable heart rate on beta blocker and anticoagulation with IV heparin.   -Continue heparin until he is closer to discharge and then would transition to apixaban    # History of C.diff: Continues with loose stools, but negative C. diff with last check 6/11.    # E. Faecium Bacteremia: Diagnosed on 6/9 BlCx. Treated with vancomycin thru 6/20    # ANCA Vasculitis: S/p Rituximab x2   - Will be on acute GN protocol post transplant.    # Gout: On prednisone 10 mg PO daily PTA. Currently on prednisone 30 with gout flare with plan to eventually wean to prednisone 5 once kidney is working and tac increased >8   -Continue allopurinol 100mg daily     # Transplant History:  Etiology of Kidney Failure: IgA nephropathy and ANCA vasculitis  Tx: Liver Tx (SLK)  Transplant: 6/6/2023 (Liver), 6/6/2023 (Kidney)  Significant changes in immunosuppression: None  Significant transplant-related complications: None    Recommendations were communicated to the primary team verbally     Eran Cisse MD   Visiting Resident Nephrology PGY II    06/22/23 12:17 PM      Physician Attestation   I, Subhash Dutta MD, personally examined and evaluated this patient.  I discussed the patient with the resident/fellow and care team, and agree with the assessment and plan of care as documented in the note on 06/22/23 .      I personally reviewed vital signs, medications, labs, and imaging.    Key findings: No indication for dialysis. Change bumex to 2mg PO BID, stop midodrine. Replete K   Subhash Dutta MD  Date of Service (when I saw the patient): 06/22/23          Interval History   Mr Quinones is doing very well. He continues to improve daily. He has been working more on his strength and increasing his oral intake. His urine output has increased significantly. His BP has also been very stable. Biopsy showed ATN. 2.9l out yesterday.         Review of Systems   4 point ROS was obtained and negative except as noted in the Interval History.    MEDICATIONS:    allopurinol  100 mg Oral Daily     aspirin  325 mg Oral or Feeding Tube Daily     atorvastatin  20 mg Oral or Feeding Tube QPM     bumetanide  2 mg Oral BID     diclofenac  4 g Topical TID     fiber modular  1 packet Per Feeding Tube TID     heparin  3 mL Intracatheter Q24H     insulin aspart  1-12 Units Subcutaneous Q4H     insulin glargine  10 Units Subcutaneous At Bedtime     methocarbamol  500 mg Oral or Feeding Tube Q6H     metoprolol tartrate  12.5 mg Oral or Feeding Tube Q8H KARAN     multivitamin RENAL  1 tablet Oral or Feeding Tube Daily     mycophenolate  750 mg Oral BID IS     pantoprazole  40 mg Per Feeding Tube QAM AC     predniSONE  30 mg Per Feeding Tube Daily     protein modular  1 packet Per Feeding Tube TID     sodium bicarbonate  650 mg Oral or Feeding Tube TID     sodium chloride (PF)  10-40 mL Intracatheter Q7 Days     sodium chloride (PF)  3 mL Intracatheter Q8H     sodium chloride (PF)  3 mL Intravenous Q8H     sulfamethoxazole-trimethoprim  1 tablet Oral or Feeding Tube Once per day on Mon Wed  "Fri     tacrolimus  1.5 mg Oral BID IS     ursodiol  250 mg Oral or Feeding Tube BID     valGANciclovir  450 mg Oral Once per day on     Or     valGANciclovir  450 mg Oral or NG Tube Once per day on      warfarin ANTICOAGULANT  1 mg Oral ONCE at 18:00     Warfarin Therapy Reminder  1 each Oral See Admin Instructions       dextrose       heparin 2,000 Units/hr (23 1200)       Physical Exam   Temp  Av.9  F (36.6  C)  Min: 97  F (36.1  C)  Max: 99.2  F (37.3  C)  Arterial Line BP  Min: 60/45  Max: 131/80  Arterial Line MAP (mmHg)  Av mmHg  Min: 52 mmHg  Max: 101 mmHg      Pulse  Av.5  Min: 68  Max: 159 Resp  Av.8  Min: 14  Max: 23  FiO2 (%)  Av.3 %  Min: 40 %  Max: 50 %  SpO2  Av.1 %  Min: 92 %  Max: 100 %    CVP (mmHg): 6 mmHgBP 116/74 (BP Location: Right arm)   Pulse 82   Temp 98.6  F (37  C) (Oral)   Resp 12   Ht 1.702 m (5' 7\")   Wt 101.7 kg (224 lb 3.3 oz)   SpO2 100%   BMI 35.12 kg/m      Admit Weight: 102.3 kg (225 lb 8 oz)     GENERAL APPEARANCE: alert and no distress  HENT: mouth without ulcers or lesions  RESP: lungs clear to auscultation - no rales, rhonchi or wheezes  CV: regular rhythm, normal rate, no rub, no murmur  EDEMA: trace to 1+ LE edema bilaterally, but improved from previous days.   ABDOMEN: soft, nondistended, mildly tender, bowel sounds normal  MS: extremities normal - no gross deformities noted, no evidence of inflammation in joints, no muscle tenderness  SKIN: no rash but multiple hematomas on several parts of his body including neck, arm slowly fading.   TX KIDNEY: mild TTP  DIALYSIS ACCESS:  Temporary catheter right internal jugular    Data   All labs reviewed by me.  CMP  Recent Labs   Lab 23  0814 23  0503 23  0352 23  2334 23  0910 23  0410 23  0352 23  0344 23  0359 23  0354   NA  --  131*  --   --   --  130*  --  130*  --  136   POTASSIUM  --  3.4  --   --   --  3.6  -- "  3.4  --  3.8   CHLORIDE  --  90*  --   --   --  92*  --  95*  --  102   CO2  --  23  --   --   --  23  --  22  --  22   ANIONGAP  --  18*  --   --   --  15  --  13  --  12   * 156* 151* 221*   < > 204*   < > 170*   < > 161*   BUN  --  73.1*  --   --   --  51.6*  --  26.1*  --  21.5   CR  --  3.02*  --   --   --  2.52*  --  1.51*  --  1.07   GFRESTIMATED  --  22*  --   --   --  28*  --  51*  --  77   NAZARIO  --  9.0  --   --   --  8.8  --  8.7*  --  8.3*   MAG  --  1.7  --   --   --  1.8  --  1.7  --  2.1   PHOS  --  3.9  --   --   --  3.5  --  2.6  --  3.7   PROTTOTAL  --  5.6*  --   --   --  5.3*  --  5.4*  --  5.1*   ALBUMIN  --  2.4*  --   --   --  2.4*  --  2.6*  --  2.6*   BILITOTAL  --  1.0  --   --   --  1.2  --  2.2*  --  1.6*   ALKPHOS  --  298*  --   --   --  286*  --  323*  --  240*   AST  --  48*  --   --   --  49*  --  88*  --  58*   ALT  --  83*  --   --   --  84*  --  93*  --  82*    < > = values in this interval not displayed.     CBC  Recent Labs   Lab 06/22/23  0503 06/21/23  1241 06/21/23  0410 06/20/23  0344   HGB 7.4* 7.4* 6.8* 7.1*   WBC 14.4* 16.9* 17.3* 13.1*   RBC 2.39* 2.46* 2.27* 2.31*   HCT 23.8* 24.2* 22.2* 22.4*    98 98 97   MCH 31.0 30.1 30.0 30.7   MCHC 31.1* 30.6* 30.6* 31.7   RDW 19.0* 19.3* 19.1* 18.9*    215 212 160     INR  Recent Labs   Lab 06/22/23  0813 06/20/23  0344 06/19/23  0354 06/18/23  1512 06/18/23  0343 06/17/23  0854   INR 1.04 1.08 1.09 1.11   < >  --    PTT  --   --   --  46*  --  40*    < > = values in this interval not displayed.     ABG  Recent Labs   Lab 06/15/23  1723   PH 7.44   PCO2 36   PO2 112*   HCO3 24   O2PER 2  2      Urine Studies  Recent Labs   Lab Test 06/16/23  1220 06/10/23  1613 06/05/23  1620 04/07/23  1246   COLOR Yellow Orange* Yellow Yellow   APPEARANCE Cloudy* Cloudy* Clear Clear   URINEGLC 150* 30* Negative Negative   URINEBILI Negative Negative Negative Negative   URINEKETONE Negative Negative Negative Negative   SG  1.014 1.021 1.012 1.011   UBLD Large* Large* Moderate* Moderate*   URINEPH 5.5 5.5 5.5 5.5   PROTEIN 70* 100* 10* 10*   NITRITE Negative Negative Negative Negative   LEUKEST Large* Large* Negative Negative   RBCU >182* >182* 2 5*   WBCU 56* 128* 7* 2     No lab results found.  PTH  Recent Labs   Lab Test 05/19/23  0956   PTHI 67*     Iron Studies  Recent Labs   Lab Test 11/22/22  0848   IRON 68   *   IRONSAT 31   HOLA 429*       IMAGING:  All imaging studies reviewed by me.

## 2023-06-22 NOTE — PROGRESS NOTES
CLINICAL NUTRITION SERVICES - BRIEF NOTE     Reason for RD note: Follow up on nutrition plan of care    New Findings/Chart Review:  Nutrition support: cycled EN via NDT since 6/18: Novasorce renal @ 40 ml/hr overnight ( from 8:00 pm-8:00 am) + 3 pkts prosource TF20  provides 1200 kcal (15 kcal/kg), 104 g pro (1.3 g/kg), 88 g CHO, 344 ml free water, and 0 g fiber daily. Cycled EN providers ~ 2/3 estimated needs     Oral Diet/Intake: Regular with supplements PRN, Calorie counts through 6/23 6/21       Total Kcals: 1606     Total Protein: 48g    Per visit with patient and wife - nausea and appetite much improved.  Pt consuming 2 meals per day + magic cup.  Three meals are difficult to fit in due to later breakfast with cycled feeds and afternoon therapies.  Note plan for TCU, possibly this weekend.  Pt's wife has ordered mocha flavored novasource renal oral supplement for home use.     Interventions:  - Chocolate Magic cup TID  - Collaboration with providers: Discussed with RN and transplant team - plan for removal of feeding tube in the setting of much improved PO and acceptance of ONS  - Reinforcement of post transplant diet provided, pt and wife with good understanding  - Discharge diet instructions written    Future/Additional Recommendations:  - Calore count through 6/23    Nutrition will continue to follow per protocol.    Rekha Mcdonough, RD, LD, CNSC  [address]A SICU RD pager: [phone]  Ascom: [phone]  Weekend/Holiday RD pager [phone]

## 2023-06-22 NOTE — PROGRESS NOTES
Calorie Count  Intake recorded for: 6/21  Total Kcals: 1606 Total Protein: 48g  Kcals from Hospital Food: 1606   Protein: 48g  Kcals from Outside Food (average):0 Protein: 0g  # Meals Ordered from Kitchen: 2  # Meals Recorded: 2 Meals (First- 100% of 8oz 1% milk, 2 pancakes w/ butter & syrup, 25% pork sausage dalila)    (Second - 50% penne w/ meat sauce & parm cheese & butter, corn)  # Supplements Recorded: 100% of 2 Magic Cups

## 2023-06-22 NOTE — PHARMACY-ANTICOAGULATION SERVICE
Clinical Pharmacy - Warfarin Dosing Consult     Pharmacy has been consulted to manage this patient s warfarin therapy.  Indication: Atrial Fibrillation; DVT/ PE Treatment  Therapy Goal: INR goal 1.5-2  Provider/Team: Liver transplant surgery  OP Anticoag Clinic: [facility]  Warfarin Prior to Admission: Yes  Warfarin PTA Regimen: 2 mg every Sun, Tue, Thu; 3 mg all other days  Significant drug interactions: Bactrim    INR   Date Value Ref Range Status   06/22/2023 1.04 0.85 - 1.15 Final   06/20/2023 1.08 0.85 - 1.15 Final     Was therapeutic with an INR goal of 2-3 prior to admission with above PTA dosing regimen, with new drug interaction and lower INR goal will start at a lower dose of 1 mg. Pharmacy will monitor Damion KEVIN Quinones daily and order warfarin doses to achieve specified goal.      Please contact pharmacy as soon as possible if the warfarin needs to be held for a procedure or if the warfarin goals change.

## 2023-06-22 NOTE — PROGRESS NOTES
Transplant Social Work Services Progress Note      Date of Initial Social Work Evaluation: 11/22/2022  Collaborated with: Liver Transplant Team, outpatient coordinator Cindy Clay, RN, [facility] (Kadie HEAD)    Data: Casey received a liver and kidney transplant on 6/6/23.  Intervention: I met with Casey and his wife Apoorva and discussed anticipated discharge date and continued recommendation for TCU/ARU.  PM&R consult requested. 2441 for completed  Assessment: Casey and his wife are in agreement with recommendation for TCU/ARU and they prefer [facility].  Education provided by SW: Writing to the Donor Family, Pharmacy test claim  Plan:    Discharge Plans in Progress: [facility] TCU/ARU     Barriers to d/c plan: medical stability-estimated discharge date of Saturday, 6/23, awaiting PM&R    Follow up Plan: I will follow for support and discharge planning.        MOUNIKA Sawant, [facility]  Liver Transplant   Phone [phone]  Pager [phone]

## 2023-06-22 NOTE — PROGRESS NOTES
Major Shift Events:   AOX4. Denies pain. NJ removed. PICC doesn't draw back. Team aware. Pt eating great. Heparin gtt. Provider adjusting dosage. Pt working with therapies. 1 BM this shift. Using urinal. Family present.    **Nurse paged fellow (Subhash Singh) regarding recent resulted Xa level with no response or intervention.     Plan: Continue to work with therapies. Transfer off unit when bed available. Possible discharge to TCU this weekend.    For vital signs and complete assessments, please see documentation flowsheets.

## 2023-06-22 NOTE — PROGRESS NOTES
Transplant Surgery  Inpatient Daily Progress Note  2023    Assessment & Plan: Damion Quinones is a 67 year old male with PMHx of decompensated alcohol + hemochromatosis (homozygous H63D) cirrhosis complicated by variceal bleeding s/p TIPS (10/1/2022) and hepatic encephalopathy + CKD (IgA nephropathy + ANCA vasculitis) s/p simultaneous liver kidney transplant on 2023. RTOR on  with concern for low flow in the renal graft - ex-lap, washout, closure with healthy appearing graft with intact arterial and venous flow.     s/p DBD simultaneous liver kidney transplant on 2023 with complex reconstruction of accessory right hepatic artery:  * RTOR on  with concern for low flow in the renal graft - ex-lap, washout, closure with healthy appearing graft with intact arterial and venous flow.      Liver studies elevated, decreased today. Alk phos 240-300's and total bilirubin trending down 1.6->2.2->1.2->1. AST 40's and ALT 80's respectively. Continue daily hepatic panel.     -23 Liver ultrasound with doppler d/t elevated alk phos and TB. Low RI right and left HA again seen, no significant change. 2 new fluid collections, possible hematoma/seromas.     Stent: No biliary stent in place. Ursodiol 250 mg BID.  Vascular ppx:  mg daily      H/o CKD related IgA nephropathy and ANCA vasculitis s/p  donor renal transplant on 2023 with ureteral/J stent placement, DGF:    * Nephrology consulting. Latest renal US with doppler  with faster than expected iliac vein velocity above the anastomosis may represent edema and elevated superior arcuate artery resistive index.    Restarted on CRRT given worsening renal dysfunction, uremia, and encephalopathy -. Temporary HD line in-place. iHD on .    Cr increased 1.5 -> 2.5. -> 3. K stable. UOP increasing, 1.5L yesterday and 685 ml thus far today.     - Change Bumex 2 mg to PO BID per nephrology  - Preliminary results of kidney biopsy  on 6/20/2023 demonstrating ATN.  - No plan for CRRT or HD today    Metabolic acidosis: Continue sodium bicarbonate per nephrology.     Immunosuppressed status secondary to medications:   Induction: Steroid taper and Thymoglobulin 400 mg (4 mg/kg) complete.   Maintenance:    -  mg BID   - Tacrolimus 1 mg BID; goal 5-8 given slow renal recovery   - Resumed PTA prednisone for rheumatoid arthritis (see below). Goal to wean down to 5 mg prednisone for chronic use.     Hematology:   Acute on chronic anemia: Last transfused 6/17. Hgb stable 7-8 without any s/sx of bleeding.     Leukocytosis: Afebrile. CT chest 6/16 with bibasilar atelectasis + small bilateral pleural effusion. Patchy densities in the left upper lobe anterolaterally. Infectious workup negative to date. Breathing comfortably on ambient air. WBC 14 (from 17), trending down.     Anticoagulation: due to PAF and US 6/15 with partially occlusive inferior R internal jugular thrombus (previously partially occlusive thrombus in right axillary resolved). Currently on heparin gtt. Plan to start warfarin today with goal INR 1.5-2.     Cardiology:  Coronary artery disease: Continue  mg daily and atorvastatin 20 mg daily.      Paroxysmal atrial fibrillation: Cardiology consulted.              * Metoprolol 12.5 mg q8h + metoprolol 5mg IV PRN for sustained tachycardia > 120.               * Continuous rate heparin gtt ordered. Monitor heparin 10A q6hrs. Goal 0.15-2 due to concern for bleeding. Provider will adjust heparin rate.    * Plan for long term anti-coagulation; start warfarin (INR goal 1.5-2) today.     Hypotension; RESOLVED: 6/10 Echo: EF 55-60%. Intermittently required Vasopressors - now off (s/p 6/6-6/12, 6/15-6/17).   * Midodrine 20 mg Q8H discontinued today    Hypoxia: Likely related to fluid overload and deconditioning.   - Diuresis with Bumex as above.     GI/Nutrition:   Severe malnutrition in the context of acute illness: Nutrition consulted.  TF via NJ at 40cc/hr from 8p to 8a + 30cc q4 hour free water flush. Regular diet with supplements. Calorie counts improving, ate 1600 kcals yesterday.     Endocrine:   Steroid/TF induced hyperglycemia: HgA1c 6.1% (6/6/2023). Continue Lantus and sliding scale Novolog q4h.     Rheumatology:  Psoriatic arthritis: Prior to admission was on prednisone 10 mg daily.   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 Monitor for urinary retention. Record UO and missed voids.     Neuro/Psych:  Acute post operative pain:                * Methocarbamol 500 mg q6h              * Oxycodone 2.5-5 mg q4h PRN   * Voltaren gel  Alcohol use disorder: Continue sober supports post transplant. Continue complete sobriety.    MSK:  Deconditioning/Weakness: OT and PT optimization. Currently recommending TCU.     Infectious disease:  Hepatitis B s Ag +: Noted to be positive pre-transplant on 6/5/23, but not previously positive (1/26/2023). HBV DNA negative on 6/5/23. No clear at risk behavior. Repeat Hep B s Ag was negative and HBV DNA not detected; suspect 6/5/2023 HBsAg positivity was a false positive.      Prophylaxis: DVT (heparin as above), fall, GI (PPI BID), CMV (valganciclovir, CMV IgG Ab +), PJP (Bactrim)     Disposition: Transfer to  when bed is available. Continue transplant education. Daily PT/OT, currently recommending TCU. Will discuss with care coordinator.     GEORGE/Fellow/Resident Provider: Lizbeth Greer, NP [id number]    Faculty: Dr. Conor Dc  _________________________________________________________________    Interval History:   Overnight events: No acute events overnight. Afebrile.  - UOP 3L yesterday. No HD since 6/19.  - Ongoing pain in  "feet, right > left  - Stood with assistance    ROS:   A 10-point review of systems was negative except as noted above.    Meds:    allopurinol  100 mg Oral Daily     aspirin  325 mg Oral or Feeding Tube Daily     atorvastatin  20 mg Oral or Feeding Tube QPM     bumetanide  2 mg Oral BID     diclofenac  4 g Topical TID     fiber modular  1 packet Per Feeding Tube TID     heparin  3 mL Intracatheter Q24H     insulin aspart  1-12 Units Subcutaneous Q4H     insulin glargine  10 Units Subcutaneous At Bedtime     methocarbamol  500 mg Oral or Feeding Tube Q6H     metoprolol tartrate  12.5 mg Oral or Feeding Tube Q8H KARAN     multivitamin RENAL  1 tablet Oral or Feeding Tube Daily     mycophenolate  750 mg Oral BID IS     ondansetron  4 mg Oral TID AC    Or     ondansetron  4 mg Intravenous TID AC     pantoprazole  40 mg Per Feeding Tube QAM AC     potassium chloride  40 mEq Oral Once     predniSONE  30 mg Per Feeding Tube Daily     protein modular  1 packet Per Feeding Tube TID     sodium bicarbonate  650 mg Oral or Feeding Tube TID     sodium chloride (PF)  10-40 mL Intracatheter Q7 Days     sodium chloride (PF)  3 mL Intracatheter Q8H     sodium chloride (PF)  3 mL Intravenous Q8H     sulfamethoxazole-trimethoprim  1 tablet Oral or Feeding Tube Once per day on Mon Wed Fri     tacrolimus  1.5 mg Oral BID IS     ursodiol  250 mg Oral or Feeding Tube BID     valGANciclovir  450 mg Oral Once per day on Mon Fri    Or     valGANciclovir  450 mg Oral or NG Tube Once per day on Mon Fri       Physical Exam:     Admit Weight: 102.3 kg (225 lb 8 oz)    Current vitals:   /74 (BP Location: Right arm)   Pulse 82   Temp 98.6  F (37  C) (Oral)   Resp 12   Ht 1.702 m (5' 7\")   Wt 101.7 kg (224 lb 3.3 oz)   SpO2 100%   BMI 35.12 kg/m      Vital sign ranges:    Temp:  [97.5  F (36.4  C)-98.6  F (37  C)] 98.6  F (37  C)  Pulse:  [] 82  Resp:  [8-24] 12  BP: (113-129)/(71-92) 116/74  SpO2:  [98 %-100 %] 100 %    General " Appearance: Sitting in chair, appears comfortable.   Skin: Warm, perfused  Heart: Afib, HR ~80s.  Lungs: Breathing comfortably on ambient air  Abdomen: Obese, surgical scar (liver + kidney) - clean, dry and intact.  : No stauffer.   Extremities: 2+ edema in upper and lower extremities bilaterally  - Tophaceous gout on right foot.   Neurologic: A&Ox4.     Data:   CMP  Recent Labs   Lab 06/22/23  0814 06/22/23  0503 06/21/23  0910 06/21/23  0410 06/20/23  0352 06/20/23  0344 06/19/23  0359 06/19/23  0354   NA  --  131*  --  130*  --  130*  --  136   POTASSIUM  --  3.4  --  3.6  --  3.4  --  3.8   CHLORIDE  --  90*  --  92*  --  95*  --  102   CO2  --  23  --  23  --  22  --  22   * 156*   < > 204*   < > 170*   < > 161*   BUN  --  73.1*  --  51.6*  --  26.1*  --  21.5   CR  --  3.02*  --  2.52*  --  1.51*  --  1.07   GFRESTIMATED  --  22*  --  28*  --  51*  --  77   NAZARIO  --  9.0  --  8.8  --  8.7*  --  8.3*   ICAW  --   --   --   --   --  4.7  --  4.4   MAG  --  1.7  --  1.8  --  1.7  --  2.1   PHOS  --  3.9  --  3.5  --  2.6  --  3.7   ALBUMIN  --  2.4*  --  2.4*  --  2.6*  --  2.6*   BILITOTAL  --  1.0  --  1.2  --  2.2*  --  1.6*   ALKPHOS  --  298*  --  286*  --  323*  --  240*   AST  --  48*  --  49*  --  88*  --  58*   ALT  --  83*  --  84*  --  93*  --  82*    < > = values in this interval not displayed.     CBC  Recent Labs   Lab 06/22/23  0503 06/21/23  1241   HGB 7.4* 7.4*   WBC 14.4* 16.9*    215     Liver US w/doppler 6/20/2023  1.  Patent Doppler evaluation of the liver transplant vasculature. There is continued low resistive indices in the right and left hepatic arteries, with post stenotic appearance of the arterial waveform,  concerning for more proximal vascular narrowing/significant stenosis  2.  There are two postoperative fluid collections that are not definitively seen on prior ultrasound, measuring 10.8 cm medial to the right hepatic lobe and 5.5 cm in the left hepatic lobe,  likely  intraparenchymal, possibly postoperative hematomas/seromas.

## 2023-06-23 ENCOUNTER — APPOINTMENT (OUTPATIENT)
Dept: PHYSICAL THERAPY | Facility: CLINIC | Age: 68
End: 2023-06-23
Attending: INTERNAL MEDICINE
Payer: COMMERCIAL

## 2023-06-23 ENCOUNTER — TELEPHONE (OUTPATIENT)
Dept: TRANSPLANT | Facility: CLINIC | Age: 68
End: 2023-06-23
Payer: COMMERCIAL

## 2023-06-23 ENCOUNTER — APPOINTMENT (OUTPATIENT)
Dept: OCCUPATIONAL THERAPY | Facility: CLINIC | Age: 68
End: 2023-06-23
Attending: INTERNAL MEDICINE
Payer: COMMERCIAL

## 2023-06-23 PROBLEM — Z94.0 KIDNEY TRANSPLANT RECIPIENT: Status: ACTIVE | Noted: 2023-06-23

## 2023-06-23 PROBLEM — R73.9 STEROID-INDUCED HYPERGLYCEMIA: Status: ACTIVE | Noted: 2023-06-23

## 2023-06-23 PROBLEM — E43 SEVERE MALNUTRITION (H): Status: ACTIVE | Noted: 2023-06-23

## 2023-06-23 PROBLEM — L40.50 PSORIATIC ARTHRITIS (H): Status: ACTIVE | Noted: 2022-12-07

## 2023-06-23 PROBLEM — T38.0X5A STEROID-INDUCED HYPERGLYCEMIA: Status: ACTIVE | Noted: 2023-06-23

## 2023-06-23 PROBLEM — G89.18 ACUTE POST-OPERATIVE PAIN: Status: ACTIVE | Noted: 2023-06-23

## 2023-06-23 PROBLEM — K70.31 ALCOHOLIC CIRRHOSIS OF LIVER WITH ASCITES (H): Status: ACTIVE | Noted: 2019-10-11

## 2023-06-23 PROBLEM — I25.10 CAD (CORONARY ARTERY DISEASE): Status: ACTIVE | Noted: 2023-06-23

## 2023-06-23 LAB
ALBUMIN SERPL BCG-MCNC: 2.7 G/DL (ref 3.5–5.2)
ALP SERPL-CCNC: 315 U/L (ref 40–129)
ALT SERPL W P-5'-P-CCNC: 82 U/L (ref 0–70)
ANION GAP SERPL CALCULATED.3IONS-SCNC: 17 MMOL/L (ref 7–15)
AST SERPL W P-5'-P-CCNC: 49 U/L (ref 0–45)
BASOPHILS # BLD AUTO: 0 10E3/UL (ref 0–0.2)
BASOPHILS NFR BLD AUTO: 0 %
BILIRUB DIRECT SERPL-MCNC: 0.67 MG/DL (ref 0–0.3)
BILIRUB SERPL-MCNC: 1 MG/DL
BUN SERPL-MCNC: 80.9 MG/DL (ref 8–23)
CALCIUM SERPL-MCNC: 8.9 MG/DL (ref 8.8–10.2)
CHLORIDE SERPL-SCNC: 90 MMOL/L (ref 98–107)
CREAT SERPL-MCNC: 3.21 MG/DL (ref 0.67–1.17)
DEPRECATED HCO3 PLAS-SCNC: 25 MMOL/L (ref 22–29)
EOSINOPHIL # BLD AUTO: 0.1 10E3/UL (ref 0–0.7)
EOSINOPHIL NFR BLD AUTO: 1 %
ERYTHROCYTE [DISTWIDTH] IN BLOOD BY AUTOMATED COUNT: 19.4 % (ref 10–15)
GFR SERPL CREATININE-BSD FRML MDRD: 21 ML/MIN/1.73M2
GLUCOSE BLDC GLUCOMTR-MCNC: 103 MG/DL (ref 70–99)
GLUCOSE BLDC GLUCOMTR-MCNC: 334 MG/DL (ref 70–99)
GLUCOSE BLDC GLUCOMTR-MCNC: 347 MG/DL (ref 70–99)
GLUCOSE BLDC GLUCOMTR-MCNC: 96 MG/DL (ref 70–99)
GLUCOSE SERPL-MCNC: 106 MG/DL (ref 70–99)
HCT VFR BLD AUTO: 23.2 % (ref 40–53)
HGB BLD-MCNC: 7 G/DL (ref 13.3–17.7)
IMM GRANULOCYTES # BLD: 0.4 10E3/UL
IMM GRANULOCYTES NFR BLD: 4 %
INR PPP: 1.03 (ref 0.85–1.15)
LYMPHOCYTES # BLD AUTO: 0.1 10E3/UL (ref 0.8–5.3)
LYMPHOCYTES NFR BLD AUTO: 1 %
MAGNESIUM SERPL-MCNC: 1.6 MG/DL (ref 1.7–2.3)
MCH RBC QN AUTO: 29.2 PG (ref 26.5–33)
MCHC RBC AUTO-ENTMCNC: 30.2 G/DL (ref 31.5–36.5)
MCV RBC AUTO: 97 FL (ref 78–100)
MONOCYTES # BLD AUTO: 0.4 10E3/UL (ref 0–1.3)
MONOCYTES NFR BLD AUTO: 3 %
NEUTROPHILS # BLD AUTO: 9.3 10E3/UL (ref 1.6–8.3)
NEUTROPHILS NFR BLD AUTO: 91 %
NRBC # BLD AUTO: 0 10E3/UL
NRBC BLD AUTO-RTO: 0 /100
PATH REPORT.COMMENTS IMP SPEC: NORMAL
PATH REPORT.FINAL DX SPEC: NORMAL
PATH REPORT.GROSS SPEC: NORMAL
PATH REPORT.MICROSCOPIC SPEC OTHER STN: NORMAL
PATH REPORT.RELEVANT HX SPEC: NORMAL
PHOSPHATE SERPL-MCNC: 3.3 MG/DL (ref 2.5–4.5)
PHOTO IMAGE: NORMAL
PLATELET # BLD AUTO: 252 10E3/UL (ref 150–450)
POTASSIUM SERPL-SCNC: 4 MMOL/L (ref 3.4–5.3)
PROT SERPL-MCNC: 5.7 G/DL (ref 6.4–8.3)
RBC # BLD AUTO: 2.4 10E6/UL (ref 4.4–5.9)
SODIUM SERPL-SCNC: 132 MMOL/L (ref 136–145)
TACROLIMUS BLD-MCNC: 4.7 UG/L (ref 5–15)
TME LAST DOSE: ABNORMAL H
TME LAST DOSE: ABNORMAL H
UFH PPP CHRO-ACNC: 0.23 IU/ML
UFH PPP CHRO-ACNC: 0.26 IU/ML
UFH PPP CHRO-ACNC: 0.32 IU/ML
URATE SERPL-MCNC: 6.2 MG/DL (ref 3.4–7)
WBC # BLD AUTO: 10.3 10E3/UL (ref 4–11)

## 2023-06-23 PROCEDURE — 36592 COLLECT BLOOD FROM PICC: CPT | Performed by: TRANSPLANT SURGERY

## 2023-06-23 PROCEDURE — 99223 1ST HOSP IP/OBS HIGH 75: CPT | Mod: 24 | Performed by: INTERNAL MEDICINE

## 2023-06-23 PROCEDURE — 84550 ASSAY OF BLOOD/URIC ACID: CPT | Performed by: INTERNAL MEDICINE

## 2023-06-23 PROCEDURE — 97530 THERAPEUTIC ACTIVITIES: CPT | Mod: GO | Performed by: OCCUPATIONAL THERAPIST

## 2023-06-23 PROCEDURE — 250N000011 HC RX IP 250 OP 636: Mod: JZ | Performed by: RADIOLOGY

## 2023-06-23 PROCEDURE — 250N000012 HC RX MED GY IP 250 OP 636 PS 637: Performed by: TRANSPLANT SURGERY

## 2023-06-23 PROCEDURE — 83735 ASSAY OF MAGNESIUM: CPT | Performed by: SURGERY

## 2023-06-23 PROCEDURE — 99024 POSTOP FOLLOW-UP VISIT: CPT | Performed by: TRANSPLANT SURGERY

## 2023-06-23 PROCEDURE — 120N000011 HC R&B TRANSPLANT UMMC

## 2023-06-23 PROCEDURE — 85025 COMPLETE CBC W/AUTO DIFF WBC: CPT | Performed by: SURGERY

## 2023-06-23 PROCEDURE — 85520 HEPARIN ASSAY: CPT | Performed by: TRANSPLANT SURGERY

## 2023-06-23 PROCEDURE — 250N000013 HC RX MED GY IP 250 OP 250 PS 637: Performed by: TRANSPLANT SURGERY

## 2023-06-23 PROCEDURE — 97530 THERAPEUTIC ACTIVITIES: CPT | Mod: GP | Performed by: PHYSICAL THERAPIST

## 2023-06-23 PROCEDURE — 85520 HEPARIN ASSAY: CPT | Performed by: PHYSICIAN ASSISTANT

## 2023-06-23 PROCEDURE — 36415 COLL VENOUS BLD VENIPUNCTURE: CPT | Performed by: PHYSICIAN ASSISTANT

## 2023-06-23 PROCEDURE — 250N000012 HC RX MED GY IP 250 OP 636 PS 637: Performed by: INTERNAL MEDICINE

## 2023-06-23 PROCEDURE — 250N000013 HC RX MED GY IP 250 OP 250 PS 637: Performed by: INTERNAL MEDICINE

## 2023-06-23 PROCEDURE — 82310 ASSAY OF CALCIUM: CPT | Performed by: SURGERY

## 2023-06-23 PROCEDURE — 99233 SBSQ HOSP IP/OBS HIGH 50: CPT | Mod: 24 | Performed by: INTERNAL MEDICINE

## 2023-06-23 PROCEDURE — 99254 IP/OBS CNSLTJ NEW/EST MOD 60: CPT | Mod: GC | Performed by: PHYSICAL MEDICINE & REHABILITATION

## 2023-06-23 PROCEDURE — 250N000012 HC RX MED GY IP 250 OP 636 PS 637: Performed by: PHYSICIAN ASSISTANT

## 2023-06-23 PROCEDURE — 80197 ASSAY OF TACROLIMUS: CPT | Performed by: PHYSICIAN ASSISTANT

## 2023-06-23 PROCEDURE — 250N000011 HC RX IP 250 OP 636: Mod: JZ | Performed by: NURSE PRACTITIONER

## 2023-06-23 PROCEDURE — 85610 PROTHROMBIN TIME: CPT | Performed by: NURSE PRACTITIONER

## 2023-06-23 PROCEDURE — 84100 ASSAY OF PHOSPHORUS: CPT | Performed by: SURGERY

## 2023-06-23 PROCEDURE — 99232 SBSQ HOSP IP/OBS MODERATE 35: CPT | Mod: 24 | Performed by: TRANSPLANT SURGERY

## 2023-06-23 PROCEDURE — 82248 BILIRUBIN DIRECT: CPT | Performed by: SURGERY

## 2023-06-23 RX ORDER — PREDNISONE 10 MG/1
10 TABLET ORAL DAILY
Status: DISCONTINUED | OUTPATIENT
Start: 2023-06-24 | End: 2023-06-24

## 2023-06-23 RX ORDER — CARBOXYMETHYLCELLULOSE SODIUM 5 MG/ML
1 SOLUTION/ DROPS OPHTHALMIC
Status: DISCONTINUED | OUTPATIENT
Start: 2023-06-23 | End: 2023-06-25 | Stop reason: HOSPADM

## 2023-06-23 RX ORDER — METHOCARBAMOL 500 MG/1
500 TABLET, FILM COATED ORAL 4 TIMES DAILY PRN
Status: DISCONTINUED | OUTPATIENT
Start: 2023-06-23 | End: 2023-06-25 | Stop reason: HOSPADM

## 2023-06-23 RX ORDER — MYCOPHENOLATE MOFETIL 250 MG/1
750 CAPSULE ORAL
Status: DISCONTINUED | OUTPATIENT
Start: 2023-06-23 | End: 2023-06-25 | Stop reason: HOSPADM

## 2023-06-23 RX ORDER — TACROLIMUS 1 MG/1
2 CAPSULE ORAL
Status: DISCONTINUED | OUTPATIENT
Start: 2023-06-23 | End: 2023-06-25 | Stop reason: HOSPADM

## 2023-06-23 RX ORDER — WARFARIN SODIUM 1 MG/1
1 TABLET ORAL
Status: COMPLETED | OUTPATIENT
Start: 2023-06-23 | End: 2023-06-23

## 2023-06-23 RX ORDER — PANTOPRAZOLE SODIUM 40 MG/1
40 TABLET, DELAYED RELEASE ORAL
Status: DISCONTINUED | OUTPATIENT
Start: 2023-06-24 | End: 2023-06-25 | Stop reason: HOSPADM

## 2023-06-23 RX ORDER — MAGNESIUM OXIDE 400 MG/1
800 TABLET ORAL ONCE
Status: COMPLETED | OUTPATIENT
Start: 2023-06-23 | End: 2023-06-23

## 2023-06-23 RX ORDER — MYCOPHENOLATE MOFETIL 200 MG/ML
750 POWDER, FOR SUSPENSION ORAL
Status: DISCONTINUED | OUTPATIENT
Start: 2023-06-23 | End: 2023-06-23

## 2023-06-23 RX ORDER — HEPARIN SODIUM 10000 [USP'U]/100ML
0-5000 INJECTION, SOLUTION INTRAVENOUS CONTINUOUS
Status: DISCONTINUED | OUTPATIENT
Start: 2023-06-23 | End: 2023-06-25 | Stop reason: HOSPADM

## 2023-06-23 RX ADMIN — METHOCARBAMOL 500 MG: 500 TABLET ORAL at 02:14

## 2023-06-23 RX ADMIN — TACROLIMUS 2 MG: 1 CAPSULE ORAL at 18:51

## 2023-06-23 RX ADMIN — WARFARIN SODIUM 1 MG: 1 TABLET ORAL at 20:06

## 2023-06-23 RX ADMIN — PREDNISONE 30 MG: 20 TABLET ORAL at 07:57

## 2023-06-23 RX ADMIN — BUMETANIDE 2 MG: 2 TABLET ORAL at 15:45

## 2023-06-23 RX ADMIN — Medication 3 ML: at 18:53

## 2023-06-23 RX ADMIN — ALLOPURINOL 100 MG: 100 TABLET ORAL at 07:57

## 2023-06-23 RX ADMIN — MAGNESIUM OXIDE TAB 400 MG (241.3 MG ELEMENTAL MG) 800 MG: 400 (241.3 MG) TAB at 14:25

## 2023-06-23 RX ADMIN — BUMETANIDE 2 MG: 2 TABLET ORAL at 07:57

## 2023-06-23 RX ADMIN — URSODIOL 250 MG: 250 TABLET, FILM COATED ORAL at 20:07

## 2023-06-23 RX ADMIN — TACROLIMUS 1.5 MG: 1 CAPSULE ORAL at 07:57

## 2023-06-23 RX ADMIN — MYCOPHENOLATE MOFETIL 750 MG: 250 CAPSULE ORAL at 18:51

## 2023-06-23 RX ADMIN — MYCOPHENOLATE MOFETIL 750 MG: 250 CAPSULE ORAL at 10:27

## 2023-06-23 RX ADMIN — INSULIN GLARGINE 10 UNITS: 100 INJECTION, SOLUTION SUBCUTANEOUS at 22:35

## 2023-06-23 RX ADMIN — Medication 12.5 MG: at 05:57

## 2023-06-23 RX ADMIN — Medication 12.5 MG: at 22:35

## 2023-06-23 RX ADMIN — Medication 12.5 MG: at 14:25

## 2023-06-23 RX ADMIN — ASPIRIN 325 MG: 325 TABLET ORAL at 07:57

## 2023-06-23 RX ADMIN — ATORVASTATIN CALCIUM 20 MG: 20 TABLET, FILM COATED ORAL at 20:07

## 2023-06-23 RX ADMIN — URSODIOL 250 MG: 250 TABLET, FILM COATED ORAL at 07:57

## 2023-06-23 RX ADMIN — HEPARIN SODIUM 2000 UNITS/HR: 10000 INJECTION, SOLUTION INTRAVENOUS at 20:13

## 2023-06-23 RX ADMIN — VALGANCICLOVIR 450 MG: 450 TABLET, FILM COATED ORAL at 20:07

## 2023-06-23 RX ADMIN — METHOCARBAMOL 500 MG: 500 TABLET ORAL at 07:57

## 2023-06-23 RX ADMIN — DICLOFENAC SODIUM 4 G: 10 GEL TOPICAL at 20:15

## 2023-06-23 RX ADMIN — B-COMPLEX W/ C & FOLIC ACID TAB 1 MG 1 TABLET: 1 TAB at 07:57

## 2023-06-23 RX ADMIN — SULFAMETHOXAZOLE AND TRIMETHOPRIM 1 TABLET: 400; 80 TABLET ORAL at 20:06

## 2023-06-23 ASSESSMENT — ACTIVITIES OF DAILY LIVING (ADL)
ADLS_ACUITY_SCORE: 36

## 2023-06-23 NOTE — PLAN OF CARE
Major Shift Events: Neuro intact. VSS on RA, afebrile. Regular diet, betsy count active, fair appetite. Bm x3 up to commode via lift. Voiding spontaneously. Clamshell incision stapled, EDUARDO, generalized bruising, scabbing. Edema present in all extremities. R PIV infusing heparin @ 2000 units/hr, next 10A check 1600. New L PICC dressing, SL-blood return noted. Mild gout pain. Mag replacements started. Up in chair x2 today, worked with PT/OT. Wife, Apoorva at bedside today. Report given to 7A RN, belongings, meds and chart sent with patient.    Plan: Continuation of care on 7A  For vital signs and complete assessments, please see documentation flowsheets.

## 2023-06-23 NOTE — PROGRESS NOTES
Calorie Count  Intake recorded for: 6/22  Total Kcals: 1055 Total Protein: 39g  Kcals from Hospital Food: 1055   Protein: 39g  Kcals from Outside Food (average):0 Protein: 0g  # Meals Ordered from Kitchen: 1 meal   # Meals Recorded: 1 - 100% scrambled eggs, toast w/ jelly and peanut butter, 80% home fried potatoes  # Supplements Recorded: 2 - 100% 2 Magic Cups

## 2023-06-23 NOTE — PROGRESS NOTES
Transplant Surgery  Inpatient Daily Progress Note  2023    Assessment & Plan: 67 year old male with PMHx of decompensated alcohol + hemochromatosis (homozygous H63D) cirrhosis complicated by variceal bleeding s/p TIPS (10/1/2022) and hepatic encephalopathy + CKD (IgA nephropathy + ANCA vasculitis) s/p simultaneous liver kidney transplant on 2023. RTOR on  with concern for low flow in the renal graft - ex-lap, washout, closure with healthy appearing graft with intact arterial and venous flow.     s/p DBD simultaneous liver kidney transplant on 2023 with complex reconstruction of accessory right hepatic artery:  * RTOR on  with concern for low flow in the renal graft - ex-lap, washout, closure with healthy appearing graft with intact arterial and venous flow.      Liver studies remain elevated with alk phos 315 (previously 298), AST and ALT 49 and 82 respectively, and total bilirubin 1.0.     - 23 Liver ultrasound with doppler: low RI right and left HA again seen, no significant change. 2 new fluid collections, possible hematoma.     Stent: No biliary stent in place. Ursodiol 250 mg BID.  Vascular ppx:  mg daily     H/o CKD related IgA nephropathy and ANCA vasculitis s/p  donor renal transplant on 2023 with ureteral/J stent placement, delayed graft function:   Today: SCr 2.09 and BUN 22.9. UOP: 1.59L in the last 24 hours.   Last renal US with doppler  with faster than expected iliac vein velocity above the anastomosis may represent edema and elevated superior arcuate artery resistive index. Renal biopsy 2023: demonstrating ATN, no signs of rejection upon early review    - Renal replacement therapy: CRRT -. Intermittent HD on . Temporary HD line in-place.    * No plan for renal replacement therapy today  - Diuretics: Bumex 2 mg PO BID  - Preliminary results of kidney biopsy on 2023 demonstrating ATN.    Metabolic acidosis: s/p sodium  bicarbonate     Immunosuppressed status secondary to medications:   Induction: Steroid taper and Thymoglobulin 400 mg (4 mg/kg) complete.   Maintenance:   -  mg BID  - Tacrolimus: increase to 2 mg BID; goal 5-8 given slow renal recovery  - Resumed PTA prednisone for rheumatoid arthritis (see below). Goal to wean down to 5 mg prednisone for chronic use upon discharge    Hematology:   Acute on chronic anemia: Last transfused 6/17. Hgb stable 7-8 without any s/sx of bleeding.     Leukocytosis, RESOLVED: Afebrile. CT chest 6/16 with bibasilar atelectasis + small bilateral pleural effusion. Patchy densities in the left upper lobe anterolaterally. Infectious workup negative to date. Breathing comfortably on ambient air.     RIJ clot: US 6/15 with partially occlusive inferior R internal jugular thrombus (previously partially occlusive thrombus in right axillary resolved).   - Heparin gtt with transition to warfarin (goal INR 1.5-2)    Cardiology:  Coronary artery disease: Continue  mg daily and atorvastatin 20 mg daily.      Paroxysmal atrial fibrillation: Cardiology consulted.              * Metoprolol 12.5 mg PO q8h + metoprolol 5mg IV PRN for sustained tachycardia > 120.               * Continuous rate: low intensity heparin gtt ordered.    * Transition from heparin gtt to warfarin: INR goal 1.5-2     Hypotension; RESOLVED: 6/10 Echo: EF 55-60%. Intermittently required Vasopressors - now off (s/p 6/6-6/12, 6/15-6/17).    Hypoxia, RESOLVED: Likely related to fluid overload and deconditioning. Resolved with diuresis and renal replacement therapy    GI/Nutrition:   Severe malnutrition in the context of acute illness: Nutrition consulted. S/p TF, removed 6/22/2023. Regular diet with supplements. Calorie counts improving, ate 1600 kcals yesterday.     Bowel regimen: Miralax PRN + senna PRN     Endocrine:   Steroid/TF induced hyperglycemia: HgA1c 6.1% (6/6/2023).   - Hold lantus given low fasting blood sugar  this AM  - Sliding scale Novolog q4h.     Rheumatology:  Psoriatic arthritis: Prior to admission was on prednisone 10 mg daily.   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 Continue complete sobriety.    MSK:  Deconditioning/Weakness: OT and PT optimization. Currently recommending TCU.     Infectious disease:  Hepatitis B s Ag +: Noted to be positive pre-transplant on 6/5/23, but not previously positive (1/26/2023). HBV DNA negative on 6/5/23. No clear at risk behavior. Repeat Hep B s Ag was negative and HBV DNA not detected; suspect 6/5/2023 HBsAg positivity was a false positive.      Prophylaxis: DVT (heparin as above), fall, GI (PPI BID), CMV (valganciclovir, CMV IgG Ab +), PJP (Bactrim)     Disposition: Transfer to  when bed is available. Continue transplant education. Daily PT/OT, currently recommending TCU.    GEORGE/Fellow/Resident Provider: Lolis Bermudez MD    Faculty: Dr. Conor Dc  _________________________________________________________________    Interval History:   Overnight events: No acute events overnight.  - UOP: 2.92L + 3x unmeasured  - kcal in the last 24 hours: 1055 kcals  - Breathing comfortably on ambient air  - Two bowel movements in the last 24 hours    - Tolerating oral diet; NJ removed    ROS:   A 10-point review of systems was negative except as noted above.    Meds:    allopurinol  100 mg Oral Daily     aspirin  325 mg Oral or Feeding Tube Daily      "atorvastatin  20 mg Oral or Feeding Tube QPM     bumetanide  2 mg Oral BID     diclofenac  4 g Topical TID     heparin  3 mL Intracatheter Q24H     insulin aspart  1-12 Units Subcutaneous Q4H     [Held by provider] insulin glargine  10 Units Subcutaneous At Bedtime     metoprolol tartrate  12.5 mg Oral or Feeding Tube Q8H KARAN     multivitamin RENAL  1 tablet Oral or Feeding Tube Daily     mycophenolate  750 mg Oral BID IS    Or     mycophenolate  750 mg Oral BID IS     pantoprazole  40 mg Per Feeding Tube QAM AC     [START ON 6/24/2023] predniSONE  10 mg Per Feeding Tube Daily     sodium chloride (PF)  10-40 mL Intracatheter Q7 Days     sodium chloride (PF)  3 mL Intracatheter Q8H     sodium chloride (PF)  3 mL Intravenous Q8H     sulfamethoxazole-trimethoprim  1 tablet Oral or Feeding Tube Once per day on Mon Wed Fri     tacrolimus  1.5 mg Oral BID IS     ursodiol  250 mg Oral or Feeding Tube BID     valGANciclovir  450 mg Oral Once per day on Mon Fri    Or     valGANciclovir  450 mg Oral or NG Tube Once per day on Mon Fri     Warfarin Therapy Reminder  1 each Oral See Admin Instructions       Physical Exam:     Admit Weight: 102.3 kg (225 lb 8 oz)    Current vitals:   /83   Pulse (!) 125   Temp 98.1  F (36.7  C) (Oral)   Resp 20   Ht 1.702 m (5' 7\")   Wt 101.7 kg (224 lb 3.3 oz)   SpO2 100%   BMI 35.12 kg/m      Vital sign ranges:    Temp:  [97.9  F (36.6  C)-98.7  F (37.1  C)] 98.1  F (36.7  C)  Pulse:  [] 125  Resp:  [16-20] 20  BP: (102-121)/(72-83) 111/83  SpO2:  [96 %-100 %] 100 %    General Appearance: Comfortable, no acute distress  Skin: No jaundice   Heart: Atrial fibrillation   Lungs: Breathing comfortably on ambient air  Abdomen: Obese, surgical scar (liver + kidney) - clean, dry and intact.  Extremities: 2+ edema in upper and lower extremities bilaterally  Neurologic: A&Ox4.     Data:   CMP  Recent Labs   Lab 06/23/23  0800 06/23/23  0449 06/22/23  1937 06/22/23  1623 06/22/23  0814 " 06/22/23  0503 06/20/23  0352 06/20/23  0344 06/19/23  0359 06/19/23  0354   NA  --  132*  --   --   --  131*   < > 130*  --  136   POTASSIUM  --  4.0  --  4.5  --  3.4   < > 3.4  --  3.8   CHLORIDE  --  90*  --   --   --  90*   < > 95*  --  102   CO2  --  25  --   --   --  23   < > 22  --  22   GLC 96 106*   < >  --    < > 156*   < > 170*   < > 161*   BUN  --  80.9*  --   --   --  73.1*   < > 26.1*  --  21.5   CR  --  3.21*  --   --   --  3.02*   < > 1.51*  --  1.07   GFRESTIMATED  --  21*  --   --   --  22*   < > 51*  --  77   NAZARIO  --  8.9  --   --   --  9.0   < > 8.7*  --  8.3*   ICAW  --   --   --   --   --   --   --  4.7  --  4.4   MAG  --  1.6*  --   --   --  1.7   < > 1.7  --  2.1   PHOS  --  3.3  --   --   --  3.9   < > 2.6  --  3.7   ALBUMIN  --  2.7*  --   --   --  2.4*   < > 2.6*  --  2.6*   BILITOTAL  --  1.0  --   --   --  1.0   < > 2.2*  --  1.6*   ALKPHOS  --  315*  --   --   --  298*   < > 323*  --  240*   AST  --  49*  --   --   --  48*   < > 88*  --  58*   ALT  --  82*  --   --   --  83*   < > 93*  --  82*    < > = values in this interval not displayed.     CBC  Recent Labs   Lab 06/23/23 0449 06/22/23 0503   HGB 7.0* 7.4*   WBC 10.3 14.4*    235

## 2023-06-23 NOTE — TELEPHONE ENCOUNTER
Call to Apoorva for introduction. Casey being moved from ICU to  at the time of call, so call was short. Informed Apoorva that we would talk again prior to discharge.

## 2023-06-23 NOTE — CONSULTS
[facility]     PM&R CONSULT        Consulting Provider: Dr. Conor Dc  Reason for Consult: Assessment of rehabilitation   Date of Encounter: 2023   Date of Admission: 2023        ASSESSMENT/PLAN:    Casey Quinones is a 68 yo with PMH of psoriatic arthritis, gout, HTN, CAD, paroxysmal atrial fibrillation (on warfarin), recurrent C. Diff, ESRD on dialysis 2/2 IgA nephropathy + ANCA vasculitis, and decompensated cirrhosis 2/2 EtOH + hemochromatosis complicated by variceal bleeding and hepatic encephalopathy s/p TIPS 10/1/2022. He was admitted on 23 for a pre-operative work-up and subsequently underwent a  donor simultaneous liver and kidney transplant on 23. Post-operatively, there was concern for low flow to the renal graft, so he returned to the OR on 23 for an ex-lap and washout. Intra-operatively, the graft appeared healthy and had intact arterial and venous flow.    Physical Medicine and Rehabilitation have been consulted to evaluate patient for rehabilitation needs/discharge planning.    He remains far below his recent functional baseline. He currently requires lift for transfers. Evaluated by both PT/OT. PT recommending ARU vs TCU; OT recommending TCU, as sessions limited by fatigue. Based on the above, recommend discharge to TCU as patient would likely be unable to tolerate 3 hours of therapy based on fatigue and pain. If his tolerance improves, he would likely be a good ARU candidate given his excellent motivation and family support. Patient and spouse agreeable with plan.     Recommendations:  - Disposition: TCU. If tolerance improves, could consider ARU.     Thank you for this interesting consult.     Vamshi Gil MD  PGY3 PM&R Resident   Physical Medicine & Rehabilitation  [facility]      HPI:    Casey Quinones is a 68 yo with PMH of psoriatic arthritis, gout, HTN, CAD, paroxysmal atrial fibrillation (on warfarin), recurrent C. Diff,  ESRD on dialysis 2/2 IgA nephropathy + ANCA vasculitis, and decompensated cirrhosis 2/2 EtOH + hemochromatosis complicated by variceal bleeding and hepatic encephalopathy s/p TIPS 10/1/2022. He was admitted on 23 for a pre-operative work-up for a  donor simultaneous liver and kidney transplant.    He underwent  donor simultaneous liver and kidney transplant on 23. Post-operatively, there was concern for low flow to the renal graft, so he returned to the OR on 23 for an ex-lap and washout. Intra-operatively, the graft appeared healthy and had intact arterial and venous flow.     Hospital course has been complicated by worsening renal dysfunction, uremia, and encephalopathy requiring CRRT from  - . Now on iHD as of . Additional complications include persistent leukocytosis with a negative infectious workup to date, hypotension requiring vasopressors (-, 6/15-, now off), hypoxia due to fluid overload, and post operative pain.     Today, reports he had a rough night. Had difficulty falling/staying asleep last night, which he feels led to a poor session with PT due to fatigue. Also has significant pain in multiple joints, which he feels is related to gout. Was told that rheumatology and dermatology would see him later this afternoon. Is happy with his progress with therapies to this point. Feels he would be able to tolerate up to ~2 hours of therapy, but 3 hours would be difficulty.       PREVIOUS LEVEL OF FUNCTION:  Limited community ambulator with 4WW, uses mostly as a seat to take a break. Independent with ADLs/IADLs.       CURRENT FUNCTION:   PT: chair <> moveo via lift, sit <>stand assist x 2, moveo squats at 20 degree incline. MaxAx2 for bed mobility. Recommending ARU vs TCU.     OT: MaxAx2 STS from chair. Limited by fatigue. Recommending TCU.     SLP: Resolving dysphagia. Progressed to regular diet, thin liquids. SLP signed off 23.      LIVING  SITUATION/SUPPORT:  Patient lives with spouse in a split level home with a ramped entrance. ~8 steps between levels within the home. Has a tub/shower combo with access to a shower chair if needed.         PAST MEDICAL HISTORY:  Past Medical History:   Diagnosis Date     Alcoholic cirrhosis of liver with ascites (H) 10/11/2019     C. difficile colitis      Coronary artery disease     [facility] 4/2023 - complete occlusion of RCA     ESRD (end stage renal disease) on dialysis (H)      History of hemochromatosis 10/11/2019     Hypertension      Obesity      PAF (paroxysmal atrial fibrillation) (H)      Psoriatic arthritis (H)            CURRENT MEDICATIONS:  Current Facility-Administered Medications   Medication     albuterol (PROVENTIL HFA/VENTOLIN HFA) inhaler     allopurinol (ZYLOPRIM) tablet 100 mg     aspirin (ASA) tablet 325 mg     atorvastatin (LIPITOR) tablet 20 mg     bisacodyl (DULCOLAX) suppository 10 mg     bumetanide (BUMEX) tablet 2 mg     dextrose 10% infusion     glucose gel 15-30 g    Or     dextrose 50 % injection 25-50 mL    Or     glucagon injection 1 mg     diclofenac (VOLTAREN) 1 % topical gel 4 g     heparin 100 unit/mL injection 3 mL     heparin 100 unit/mL injection 3 mL     heparin infusion 25,000 units in D5W 250 mL ANTICOAGULANT     insulin aspart (NovoLOG) injection (RAPID ACTING)     [Held by provider] insulin glargine (LANTUS PEN) injection 10 Units     methocarbamol (ROBAXIN) tablet 500 mg     metoprolol (LOPRESSOR) injection 5 mg     metoprolol tartrate (LOPRESSOR) half-tab 12.5 mg     multivitamin RENAL (RENAVITE RX/NEPHROVITE) tablet 1 tablet     mycophenolate (GENERIC EQUIVALENT) capsule 750 mg    Or     mycophenolate (CELLCEPT BRAND) suspension 750 mg     naloxone (NARCAN) injection 0.2 mg    Or     naloxone (NARCAN) injection 0.4 mg    Or     naloxone (NARCAN) injection 0.2 mg    Or     naloxone (NARCAN) injection 0.4 mg     ondansetron (ZOFRAN ODT) ODT tab 4 mg    Or     ondansetron  "(ZOFRAN) injection 4 mg     oxyCODONE IR (ROXICODONE) half-tab 2.5-5 mg     pantoprazole (PROTONIX) 2 mg/mL suspension 40 mg     polyethylene glycol (MIRALAX) Packet 17 g     [START ON 6/24/2023] predniSONE (DELTASONE) tablet 10 mg     prochlorperazine (COMPAZINE) injection 5 mg     sennosides (SENOKOT) tablet 8.6 mg     simethicone (MYLICON) chewable tablet 80 mg     sodium chloride (PF) 0.9% PF flush 10 mL     sodium chloride (PF) 0.9% PF flush 10 mL     sodium chloride (PF) 0.9% PF flush 10-20 mL     sodium chloride (PF) 0.9% PF flush 10-40 mL     sodium chloride (PF) 0.9% PF flush 10-40 mL     sodium chloride (PF) 0.9% PF flush 3 mL     sodium chloride (PF) 0.9% PF flush 3 mL     sodium chloride (PF) 0.9% PF flush 3 mL     sodium chloride (PF) 0.9% PF flush 3 mL     sodium chloride (PF) 0.9% PF flush 3 mL     sulfamethoxazole-trimethoprim (BACTRIM) 400-80 MG per tablet 1 tablet     tacrolimus (GENERIC EQUIVALENT) capsule 1.5 mg     ursodiol (ACTIGALL) tablet 250 mg     valGANciclovir (VALCYTE) tablet 450 mg    Or     valGANciclovir (VALCYTE) solution 450 mg     Warfarin Dose Required Daily - Pharmacist Managed         EXAMINATION:  Vital signs:  Temp: 98.1  F (36.7  C) Temp src: Oral BP: 111/83 Pulse: (!) 125   Resp: 20 SpO2: 100 % O2 Device: None (Room air) Oxygen Delivery: 2 LPM Height: 170.2 cm (5' 7\") Weight: 101.7 kg (224 lb 3.3 oz)  Estimated body mass index is 35.12 kg/m  as calculated from the following:    Height as of this encounter: 1.702 m (5' 7\").    Weight as of this encounter: 101.7 kg (224 lb 3.3 oz).    General: NAD, pleasant and cooperative  HEENT: ATNC, no discharge from nares or ears    Pulmonary: breathing comfortably, no accessory muscle use  Cardiovascular: extremities warm and well perfused  Abdominal: Nondistended  MSK/neuro:   Mental Status:  Awake and alert. Follows 1-2 step commands.    Cranial Nerves: grossly normal   Sensory: Normal to light touch in bilateral upper and lower " extremities except diminished over the bilateral soles of feet   Strength: (0 to 5 scale)   Scored: _/5 Right Left   Shoulder Abd 4 4   Elbow Flexion 4 4   Elbow Extension 4 4   Wrist Extension 5 5   Hip Flexion 2 2   Knee Extension 4 4   Ankle Dorsiflexion 4 4      Tone per modified Bladimir Scale: No tone noted   Abnormal movements: None   Speech: Clear, appropriate content      LABS AND IMAGING:  Recent Labs   Lab 06/23/23  0800 06/23/23  0449 06/23/23  0434 06/22/23  1937 06/22/23  1623 06/22/23  0814 06/22/23  0813 06/22/23  0503 06/21/23  1553 06/21/23  1241 06/21/23  0910 06/21/23  0410 06/20/23  0352 06/20/23  0344   WBC  --  10.3  --   --   --   --   --  14.4*  --  16.9*  --  17.3*  --  13.1*   HGB  --  7.0*  --   --   --   --   --  7.4*  --  7.4*  --  6.8*  --  7.1*   MCV  --  97  --   --   --   --   --  100  --  98  --  98  --  97   PLT  --  252  --   --   --   --   --  235  --  215  --  212  --  160   INR  --  1.03  --   --   --   --  1.04  --   --   --   --   --   --  1.08   NA  --  132*  --   --   --   --   --  131*  --   --   --  130*  --  130*   POTASSIUM  --  4.0  --   --  4.5  --   --  3.4  --   --   --  3.6  --  3.4   CHLORIDE  --  90*  --   --   --   --   --  90*  --   --   --  92*  --  95*   CO2  --  25  --   --   --   --   --  23  --   --   --  23  --  22   BUN  --  80.9*  --   --   --   --   --  73.1*  --   --   --  51.6*  --  26.1*   CR  --  3.21*  --   --   --   --   --  3.02*  --   --   --  2.52*  --  1.51*   ANIONGAP  --  17*  --   --   --   --   --  18*  --   --   --  15  --  13   NAZARIO  --  8.9  --   --   --   --   --  9.0  --   --   --  8.8  --  8.7*   GLC 96 106* 103*   < >  --    < >  --  156*   < >  --    < > 204*   < > 170*   ALBUMIN  --  2.7*  --   --   --   --   --  2.4*  --   --   --  2.4*  --  2.6*   PROTTOTAL  --  5.7*  --   --   --   --   --  5.6*  --   --   --  5.3*  --  5.4*   BILITOTAL  --  1.0  --   --   --   --   --  1.0  --   --   --  1.2  --  2.2*   ALKPHOS  --  315*  --    --   --   --   --  298*  --   --   --  286*  --  323*   ALT  --  82*  --   --   --   --   --  83*  --   --   --  84*  --  93*   AST  --  49*  --   --   --   --   --  48*  --   --   --  49*  --  88*    < > = values in this interval not displayed.     CT Chest, 6/16/23  IMPRESSION: Indeterminate position of tip above left IJ catheter  either in or against the wall of the innominate vein, within a very  small feeding venous vessel or extravascular in the mediastinal soft  tissues. Consider fluoroscopic guided contrast injection before  catheter manipulation. This does not appear arterial.  Small pleural effusions with associated atelectasis bilaterally.  Subtle left upper lobe patchy subpleural densities may indicate other  foci of edema, atelectasis or infection.  Cardiomegaly. Aortic and coronary vascular calcifications.  Feeding tube tip in gastric pylorus.  Debris in the distal trachea.  Right IJ catheter tip in right innominate vein.  Drainage catheter in the upper abdomen. Fluid adjacent to the  posterior inferior right hepatic border. This area is incompletely  imaged on this chest CT.        Vamshi Gil MD  PM&R Resident  [facility]    Patient was staffed with attending, Dr. Marie.

## 2023-06-23 NOTE — CONSULTS
Rheumatology Consult Note-Fellow    Damion Quinones MRN# [id number]   Age: 67 year old YOB: 1955     Date of Admission: 6/5/2023    Reason for consult: Tophaceous gout    Requesting Physician: Dr. Dc      Assessment & Plan:     ASSESSMENT:  Damion Quinones is a 67 year old male with PMH significant for psoriatic arthritis, gout, heart failure, hemochromatosis with cirrhosis complicated by variceal bleeding s/p TIPS, CKD secondary to IgA nephropathy ANCA vasculitis s/p simultaneous liver kidney transplant on 6/6/2023.  Rheumatology is consulted for management of longstanding gout with significant tophaceous changes. S/p DVT simultaneous liver kidney transplant on 6/23 with concern of closure with healthy-appearing intact arterial and venous flow.  Rheumatology is consulted for management of gout.  Patient had a longstanding history of gout which was primarily managed with as needed prednisone therapy.  Allopurinol was avoided by outpatient rheumatologist with concern of liver and knee issues.  Patient has been on allopurinol 100 mg daily since 6/20 posttransplant at this time.  Patient also was between 5 to 10 mg daily outpatient for management of gout and also psoriatic arthritis.  Patient's skin lesions are overall stable at this time.  No active psoriatic skin lesions.  Currently post transplant with PATI GFR at 21.  Patient has received 3 days of 30 mg prednisone daily maintenance symptoms.  Although patient does have several joints with acute gout flare, patient's major symptom seems to be coming from tophi.  Has an ulceration on the right lateral foot with wound.    Discussion  Overall patient's current management is medically complex with his current renal transplant and liver transplant status.  Would avoid NSAIDs, colchicine in the patient.  In the patient who is naïve for allopurinol and currently s/p renal transplant, we would recommend slow dose titration of allopurinol.  Anakinra can be  "used but would avoid with the significant low GFR.  Discussed about Pegloticase use with his significant tophi, however will be significantly difficult as we cannot use methotrexate to avoid antibody formation and he is likely to develop immune reaction to it.  We would recommend treating this with a tapering course of steroids at this time.  Educated the patient that the profile will take several months to resolve while on allopurinol.  Concern with tophi that they can sometimes lead to nonhealing ulcers which needs to be surgically corrected.  Recommend consulting wound care for right lateral wound secondary to tophi.    DIAGNOSIS:  1.  Acute gouty arthritis  2.  Tophaceous gout  3.  S/p renal and liver transplant  4.  Psoriasis and psoriatic arthritis  5.  Chronic immunosuppressive use    PLAN:  -- We recommend doing a higher dose of prednisone taper to help with acute gout flare symptoms.  Recommend using taper as below  40 mg daily for 3 days followed by 30 mg daily for 3 days followed by 20 mg daily for 3 days followed by 10 mg daily.  We would defer further taper after 10 mg to transplant.  -- Patient can follow-up with rheumatology outpatient for further management of gout and titration of allopurinol.  No signs of active psoriasis of psoriatic arthritis at this time.  Patient can follow-up with outpatient rheumatologist for further follow-up of this.  Patient follows with Dr. Sorenson outpatient (currently working at [facility] )and can reach out to schedule an appointment.    I discussed the findings and recommendations with the patient.  I communicated the assessment and plan to the consulting team.    Case seen and discussed with Dr. Horne who agrees with above assessment and plan.     Thank you Conor cD MD for for this interesting consult. Please contact us if there are any questions.     Stan George,  Rheumatology Fellow,  Pager: [phone].    \"This note was generated using dragon " "software and reviewed by myself.  Please excuse any grammatical or spelling errors above\".     Staff addendum  I performed the history and physical examination of the patient and discussed the management with the fellow. I reviewed the available lab and imaging studies. I reviewed the fellow's note and agree with the documented findings and plan of care.    Charles Horne MD  Rheumatology      HPI     Damion Quinones is a 67 year old male with PMH significant for psoriatic arthritis, gout, heart failure, hemochromatosis with cirrhosis complicated by variceal bleeding s/p TIPS, CKD secondary to IgA nephropathy ANCA vasculitis s/p simultaneous liver kidney transplant on 6/6/2023.  Rheumatology is consulted for management of longstanding gout with significant tophaceous changes.  S/p DVT simultaneous liver kidney transplant on 6/23 with concern of closure with healthy-appearing intact arterial and venous flow.    Patient reports that he has been having  Off-and-on gout flareup episodes throughout the course of the last few years.  Usually the episodes are very short-lived and respond to prednisone therapy.  However, receive immunoglobulin therapy, patient started noticing significant tophaceous changes which all came on at the same time.  Patient had a significant tophaceous lesion on the right lateral foot which seems to have isolated and erupted through the skin causing chalky white drainage.  Patient also notes significant pain in bilateral MTP joints, bilateral ankles and bilateral hands.  Patient currently reports the pain at 0/10 when resting 5/10 while moving the affected joints.  Patient denies any fever, chills or night sweats.  Denies any chest pain, shortness of breath and abdominal pain.  Patient follows with Dr. Sorenson at [facility] for management of his ANCA vasculitis, psoriatic arthritis and gout.  As per the documentation, patient was not started on any urate lowering therapy with allopurinol due " to his uric acid overall improving after starting hemodialysis and was also not started due to concerns of his ongoing renal and liver issues.  Patient was started on allopurinol 100 mg daily starting 6/20 after he was found to have significant tophi and ulceration.  Patient was on CRRT initially after transplant but has been transitioned to intermittent hemodialysis at this time.  He continues to make around 2 L of urine posttransplant.  However, his creatinine continues to rise since 6/20.  For his acute gout flare, he has received 3 days of 30 mg prednisone daily and is back to his baseline 10 mg daily of prednisone at this time.  Patient denies any fevers, chills, weight loss, vision changes, headaches, focal weakness or numbness.  Denies any arthralgias, morning stiffness, Raynaud's, myalgias,  Alopecia, rash, vitiligo, photosensitivity, nasal or oral ulcers, ear fullness or drainage.   No cough or dyspnea, no hematuria, no history of blood clots.     HISTORY REVIEW:  Past Medical History:   Diagnosis Date    Alcoholic cirrhosis of liver with ascites (H) 10/11/2019    C. difficile colitis     Coronary artery disease     [facility] 4/2023 - complete occlusion of RCA    ESRD (end stage renal disease) on dialysis (H)     History of hemochromatosis 10/11/2019    Hypertension     Obesity     PAF (paroxysmal atrial fibrillation) (H)     Psoriatic arthritis (H)      Past Surgical History:   Procedure Laterality Date    APPENDECTOMY      Removed at 18 Years Old     BENCH KIDNEY  06/06/2023    Procedure: Bench kidney;  Surgeon: Chad Clifton MD;  Location:  OR    BENCH LIVER  06/06/2023    Procedure: Bench liver;  Surgeon: Chad Clifton MD;  Location:  OR    COLONOSCOPY      2014 at [facility]     CV CORONARY ANGIOGRAM N/A 04/27/2023    Procedure: Coronary Angiogram;  Surgeon: Pablo Araujo MD;  Location:  HEART CARDIAC CATH LAB    EYE SURGERY Bilateral     Cataract    H STATISTIC  PICC LINE INSERTION >5YR, FAILED Left 2023    Unable to advance catheter over the axillary area    HERNIA REPAIR      History of bilateral inguinal hernia repair: 10/28/2014. Open hernia repair: 10/2017. Abdominal wound exploration and debridement 2017    INSERT SHUNT PORTAL TRANSJUGULAR INTRAHEPTIC  2022    IR CVC TUNNEL PLACEMENT > 5 YRS OF AGE  2023    IR PICC PLACEMENT > 5 YRS OF AGE  2023    IR RENAL BIOPSY RIGHT  2023    RETURN LIVER TRANSPLANT N/A 2023    Procedure: Return liver transplant. Intra-operative ultrasound;  Surgeon: Chad Clifton MD;  Location: [facility] OR    TRANSPLANT KIDNEY RECIPIENT  DONOR N/A 2023    Procedure: Transplant kidney recipient  donor, ureteral stent placement;  Surgeon: Chad Clifton MD;  Location: [facility] OR    TRANSPLANT LIVER RECIPIENT  DONOR N/A 2023    Procedure: Transplant liver recipient  donor;  Surgeon: Chad Clifton MD;  Location: [facility] OR     Family History   Problem Relation Age of Onset    Lung Cancer Mother     Colon Cancer Father     Lung Cancer Brother     Heart Failure Brother     Kidney Disease Brother      Social History     Socioeconomic History    Marital status:      Spouse name: Not on file    Number of children: Not on file    Years of education: Not on file    Highest education level: Not on file   Occupational History    Not on file   Tobacco Use    Smoking status: Never     Passive exposure: Never    Smokeless tobacco: Never   Vaping Use    Vaping Use: Never used   Substance and Sexual Activity    Alcohol use: Not Currently     Comment: Last ETOH use was 2021    Drug use: Not Currently    Sexual activity: Not on file   Other Topics Concern    Parent/sibling w/ CABG, MI or angioplasty before 65F 55M? Not Asked   Social History Narrative    Not on file     Social Determinants of Health     Financial Resource Strain: Not on file   Food Insecurity: Not  on file   Transportation Needs: Not on file   Physical Activity: Not on file   Stress: Not on file   Social Connections: Not on file   Intimate Partner Violence: Not on file   Housing Stability: Not on file     Patient Active Problem List   Diagnosis    Alcoholic cirrhosis of liver with ascites (H)    Psoriatic arthritis (H)    PAF (paroxysmal atrial fibrillation) (H)    End-stage liver disease (H)    Leukocytosis, unspecified type    ESRD (end stage renal disease) on dialysis (H)    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)    Esophageal varices in cirrhosis (H)    Essential hypertension    Family history of colon cancer    Family history of prostate cancer    GAVE (gastric antral vascular ectasia)    Hereditary hemochromatosis (H)    Other hyperlipidemia    Psoriasis    S/P TIPS (transjugular intrahepatic portosystemic shunt)    Tophaceous gout    Deep vein thrombosis (DVT) of non-extremity vein, unspecified chronicity    Moderate protein-calorie malnutrition (H)    Alcohol use, unspecified with unspecified alcohol-induced disorder (H)    Acute deep vein thrombosis (DVT) of right lower extremity, unspecified vein (H)    Encephalopathy    Pre-operative cardiovascular examination    CKD (chronic kidney disease) stage 5, GFR less than 15 ml/min (H)    Status post coronary angiogram    Recurrent Clostridioides difficile diarrhea    Chronic atrial fibrillation (H)    Physical deconditioning    Liver transplant recipient (H)    Bacteremia due to Enterococcus    Administration of long-term prophylactic antibiotics    Immunosuppressed status (H)    Ileus, postoperative (H)    Atrial fibrillation with rapid ventricular response (H)    Delayed graft function of kidney    Hypotension     No Known Allergies      ROS     A 14 point ROS was performed with pertinent findings listed above.      Objective     PHYSICAL EXAM  /82   Pulse 112   Temp 98.2  F (36.8  C) (Oral)   " Resp 22   Ht 1.702 m (5' 7\")   Wt 101.7 kg (224 lb 3.3 oz)   SpO2 97%   BMI 35.12 kg/m    Wt Readings from Last 4 Encounters:   06/22/23 101.7 kg (224 lb 3.3 oz)   05/19/23 103.4 kg (228 lb)   05/17/23 104.3 kg (230 lb)   05/11/23 99.8 kg (220 lb)     Constitutional: WD-WN-WG cooperative  Eyes: nl EOM, PERRLA, vision, conjunctiva, sclera  ENT: nl external ears, nose, hearing, lips, teeth, gums, throat  No mucous membrane lesions, normal saliva pool  Neck: no mass or thyroid enlargement  Resp: lungs clear to auscultation, nl to palpation  CV: RRR, no murmurs, rubs or gallops, no edema  Lymph: no cervical, supraclavicular, inguinal or epitrochlear nodes  MS:  Significant tophaceous nodules on bilateral elbows.  Significant tophi in the right fifth finger and left second finger.  Wrist ROM limited but without pain..  Patient does have mild tenderness to palpation in multiple PIP and DIP joints.  Bilateral shoulder ROM WNL.  Bilateral hip ROM WNL.  Has significant tophi on bilateral patellar region and also tibial tubercle.  No significant effusion noted in bilateral knees and knee range of motion is WNL.  Bilateral ankle significantly warm, tender with decreased range of motion.  Bilateral first MTP and left fifth MTP tenderness and warmth.  Significant tophi over left second and third IP joints.  Significant tophi with ulceration over right lateral foot.  Skin: no nail pitting, alopecia, rash, nodules or lesions  Neuro: No obvious focal neurological deficits.  Psych: nl judgement, orientation, memory, affect.    DATA:  Outside Records: YES  Outside Xrays: YES  CBC:  Recent Labs   Lab Test 06/23/23  0449 06/22/23  0503 06/21/23  1241   WBC 10.3 14.4* 16.9*   RBC 2.40* 2.39* 2.46*   HGB 7.0* 7.4* 7.4*   HCT 23.2* 23.8* 24.2*   MCV 97 100 98   MCH 29.2 31.0 30.1   MCHC 30.2* 31.1* 30.6*   RDW 19.4* 19.0* 19.3*    235 215       BMP:  Recent Labs   Lab Test 06/23/23  0800 06/23/23  0449 06/23/23  0434 " 06/22/23  1937 06/22/23  1623 06/22/23  0814 06/22/23  0503 06/21/23  0910 06/21/23  0410   NA  --  132*  --   --   --   --  131*  --  130*   POTASSIUM  --  4.0  --   --  4.5  --  3.4  --  3.6   CHLORIDE  --  90*  --   --   --   --  90*  --  92*   CO2  --  25  --   --   --   --  23  --  23   ANIONGAP  --  17*  --   --   --   --  18*  --  15   GLC 96 106* 103*   < >  --    < > 156*   < > 204*   BUN  --  80.9*  --   --   --   --  73.1*  --  51.6*   CR  --  3.21*  --   --   --   --  3.02*  --  2.52*   GFRESTIMATED  --  21*  --   --   --   --  22*  --  28*   NAZARIO  --  8.9  --   --   --   --  9.0  --  8.8    < > = values in this interval not displayed.       LFT:  Recent Labs   Lab Test 06/23/23  0449 06/22/23  0503 06/21/23  0410   PROTTOTAL 5.7* 5.6* 5.3*   ALBUMIN 2.7* 2.4* 2.4*   BILITOTAL 1.0 1.0 1.2   ALKPHOS 315* 298* 286*   AST 49* 48* 49*   ALT 82* 83* 84*       No results found for: CKTOTAL  TSH   Date Value Ref Range Status   04/07/2023 3.37 0.30 - 4.20 uIU/mL Final     Lab Results   Component Value Date    URIC 6.2 06/23/2023       Inflammatory markers  No results found for: CRP  Lab Results   Component Value Date    SED 11 01/15/2023     Ferritin   Date Value Ref Range Status   11/22/2022 429 (H) 31 - 409 ng/mL Final       UA RESULTS:  Recent Labs   Lab Test 06/16/23  1220 06/10/23  1613 06/05/23  1620   COLOR Yellow Orange* Yellow   APPEARANCE Cloudy* Cloudy* Clear   URINEGLC 150* 30* Negative   URINEBILI Negative Negative Negative   URINEKETONE Negative Negative Negative   SG 1.014 1.021 1.012   UBLD Large* Large* Moderate*   URINEPH 5.5 5.5 5.5   PROTEIN 70* 100* 10*   NITRITE Negative Negative Negative   LEUKEST Large* Large* Negative   RBCU >182* >182* 2   WBCU 56* 128* 7*         Autoimmunity labs:     No results found for: RHF  No results found for: CCPIGG  No results found for: ANCA  No results found for: T6QXSLP  No results found for: P5CHTQR  No results found for: YANA  No results found for: DNA  No  results found for: RNPIGG, SMIGG, SSAIGG, SSBIGG, SCLIGG    IMAGING:  Reviewed

## 2023-06-23 NOTE — PROGRESS NOTES
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 Increasing UOP.Last iHD on 6/19. Good urine output with diuretic. Monitor urine output.    - Baseline Creatinine: ~ TBD   - Proteinuria: Not checked post transplant   - Date DSA Last Checked: Jun/2023      Latest DSA: Low level DSA (<1000 mfi) to CW9,    - BK Viremia: Not checked post transplant   - Kidney Tx Biopsy: Jun 20, 2023; Result:  ATN, no evidence of rejection  - HD Info  Access: Temporary Catheter RIJ, Days: TBD, Length: 3.0 hrs, 2-3L UF Heparin: No, FEDERICO: No, IV Iron: No, Vit D analog: No  -No indication for HD today and likely with renal recovery   - will monitor HD need but currently no need to arrange for tunneled line. 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  Slow trend down in LFTs.  Followed by Transplant Surgery.     # Immunosuppression: Tacrolimus immediate release (goal 5-8), Mycophenolate mofetil (dose 750 mg every 12 hours) and Prednisone (dose 30 mg daily) due to gout    - Induction with Recent Transplant:  rATG total 4mg/kg, stopped due to sepsis   - Changes: Not at this time. Plan to wean pred down to 5mg daily once tac goal is increased (after renal recovery), higher prednisone dose in setting of gout.     # Infection Prophylaxis:   - PJP: Sulfa/TMP (Bactrim)  - CMV: Valganciclovir  (Valcyte);CMV IgG Ab positive  - Thrush: Micafungin (Mycamine)  - Fungal: Micafungin (Mycamine)    # Hypotension, resolved: Controlled, BP increasing Goal BP: < 130/80   - Volume status: Moderate hypervolemia  EDW ~ 96.4 kg   - Changes: No, continue bumex 2mg PO BID     -Continue metoprolol 12.5mg q8h for afib     # Elevated Blood Glucose: Glucose generally running ~ 140-180s'   - On insulin prn.   - Management as per primary team.    # Anemia in Chronic Renal Disease/Surgery: Hgb: Stable, low      FEDERICO: No   - Iron studies: Replete    - Transfusion for Hgb <7 mg/dl, management per primary team    # Mineral Bone Disorder:   - Secondary renal hyperparathyroidism; PTH level: Minimally elevated ( pg/ml)        On treatment: None  - Vitamin D; level: Normal        On supplement: No  - Calcium; level: Normal       On supplement: No  - Phosphorus; level: Normal     On binder: No    # Electrolytes:   - Potassium; level: Low normal        On supplement: No, replete  - Magnesium; level: Low        On supplement: Yes, should start on magnesium 800mg po   - Bicarbonate; level: Normal        On supplement: Yes  - Sodium; level: Low    # Paroxysmal A-Fib: Stable heart rate on beta blocker and anticoagulation with IV heparin.   -Transitioning to warfarin     # History of C.diff: Continues with loose stools, but negative C. diff with last check 6/11.    # E. Faecium Bacteremia: Diagnosed on 6/9 BlCx. Treated with vancomycin thru 6/20    # ANCA Vasculitis: S/p Rituximab x2   - Will be on acute GN protocol post transplant.    # Gout: On prednisone 10 mg PO daily PTA. Currently on prednisone 30 with gout flare with plan to eventually wean to prednisone 5 once kidney is working and tac increased >8   -Continue allopurinol 100mg daily    - ok to give colchicine 0.3mg once and will check uric acid level    - should follow up with rheumatology. Consider consulting rheumatology inpatient    # Transplant History:  Etiology of Kidney  Failure: IgA nephropathy and ANCA vasculitis  Tx: Liver Tx (SLK)  Transplant: 6/6/2023 (Liver), 6/6/2023 (Kidney)  Significant changes in immunosuppression: None  Significant transplant-related complications: None    Recommendations were communicated to the primary team verbally     Eran Cisse MD   Visiting Resident Nephrology PGY II   06/23/23 1:06 PM      Physician Attestation   I, Subhash Dutta MD, personally examined and evaluated this patient.  I discussed the patient with the resident/fellow and care team, and agree with the assessment and plan of care as documented in the note on 06/23/23 .      I personally reviewed vital signs, medications, labs, and imaging.    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 Recommend giving colchicine 0.3mg PO x1, check uric acid. Consider consulting rheumatology. Stop bicarb. Remove line prior to TCU. Increase PO mag.   Subhash Dutta MD  Date of Service (when I saw the patient): 06/23/23          Interval History   Mr Quinones continues to improve. His urine output has been great and his creatine is stabilizing form current trend. His magnesium was low. His NG tube was removed and he has good oral intake. Hb low at 7         Review of Systems   4 point ROS was obtained and negative except as noted in the Interval History.    MEDICATIONS:    allopurinol  100 mg Oral Daily     aspirin  325 mg Oral or Feeding Tube Daily     atorvastatin  20 mg Oral or Feeding Tube QPM     bumetanide  2 mg Oral BID     diclofenac  4 g Topical TID     heparin  3 mL Intracatheter Q24H     insulin aspart  1-12 Units Subcutaneous Q4H     [Held by provider] insulin glargine  10 Units Subcutaneous At Bedtime     metoprolol tartrate  12.5 mg Oral or Feeding Tube Q8H KARAN     multivitamin RENAL  1 tablet Oral or Feeding Tube Daily     mycophenolate  750 mg Oral BID IS     [START ON 6/24/2023] pantoprazole  40 mg Oral QAM AC     [START ON 6/24/2023] predniSONE  10 mg Per Feeding Tube Daily     sodium chloride  "(PF)  10-40 mL Intracatheter Q7 Days     sodium chloride (PF)  3 mL Intracatheter Q8H     sodium chloride (PF)  3 mL Intravenous Q8H     sulfamethoxazole-trimethoprim  1 tablet Oral or Feeding Tube Once per day on      tacrolimus  2 mg Oral BID IS     ursodiol  250 mg Oral or Feeding Tube BID     valGANciclovir  450 mg Oral Once per day on      warfarin ANTICOAGULANT  1 mg Oral ONCE at 18:00     Warfarin Therapy Reminder  1 each Oral See Admin Instructions       dextrose       heparin 2,000 Units/hr (23 1200)       Physical Exam   Temp  Av.9  F (36.6  C)  Min: 97  F (36.1  C)  Max: 99.2  F (37.3  C)  Arterial Line BP  Min: 60/45  Max: 131/80  Arterial Line MAP (mmHg)  Av mmHg  Min: 52 mmHg  Max: 101 mmHg      Pulse  Av.5  Min: 68  Max: 159 Resp  Av.8  Min: 14  Max: 23  FiO2 (%)  Av.3 %  Min: 40 %  Max: 50 %  SpO2  Av.1 %  Min: 92 %  Max: 100 %    CVP (mmHg): 6 mmHgBP 111/83   Pulse (!) 125   Temp 98.1  F (36.7  C) (Oral)   Resp 20   Ht 1.702 m (5' 7\")   Wt 101.7 kg (224 lb 3.3 oz)   SpO2 100%   BMI 35.12 kg/m      Admit Weight: 102.3 kg (225 lb 8 oz)     GENERAL APPEARANCE: alert and no distress  RESP: lungs clear to auscultation - no rales, rhonchi or wheezes  CV: regular rhythm, normal rate, no rub, no murmur  EDEMA: trace to 1+ LE edema bilaterally, but improved from previous days.   ABDOMEN: soft, nondistended, mildly tender, bowel sounds normal  MS: extremities normal - no gross deformities noted, no evidence of inflammation in joints, no muscle tenderness  SKIN: no rash but multiple hematomas on several parts of his body including neck, arm slowly fading.   TX KIDNEY: mild TTP    Data   All labs reviewed by me.  CMP  Recent Labs   Lab 23  0800 23  0449 23  0434 23  2339 23  1937 23  1623 23  0814 23  0503 23  0910 23  0410 23  0352 23  0344   NA  --  132*  --   --   --   --   -- "  131*  --  130*  --  130*   POTASSIUM  --  4.0  --   --   --  4.5  --  3.4  --  3.6  --  3.4   CHLORIDE  --  90*  --   --   --   --   --  90*  --  92*  --  95*   CO2  --  25  --   --   --   --   --  23  --  23  --  22   ANIONGAP  --  17*  --   --   --   --   --  18*  --  15  --  13   GLC 96 106* 103* 178*   < >  --    < > 156*   < > 204*   < > 170*   BUN  --  80.9*  --   --   --   --   --  73.1*  --  51.6*  --  26.1*   CR  --  3.21*  --   --   --   --   --  3.02*  --  2.52*  --  1.51*   GFRESTIMATED  --  21*  --   --   --   --   --  22*  --  28*  --  51*   NAZARIO  --  8.9  --   --   --   --   --  9.0  --  8.8  --  8.7*   MAG  --  1.6*  --   --   --   --   --  1.7  --  1.8  --  1.7   PHOS  --  3.3  --   --   --   --   --  3.9  --  3.5  --  2.6   PROTTOTAL  --  5.7*  --   --   --   --   --  5.6*  --  5.3*  --  5.4*   ALBUMIN  --  2.7*  --   --   --   --   --  2.4*  --  2.4*  --  2.6*   BILITOTAL  --  1.0  --   --   --   --   --  1.0  --  1.2  --  2.2*   ALKPHOS  --  315*  --   --   --   --   --  298*  --  286*  --  323*   AST  --  49*  --   --   --   --   --  48*  --  49*  --  88*   ALT  --  82*  --   --   --   --   --  83*  --  84*  --  93*    < > = values in this interval not displayed.     CBC  Recent Labs   Lab 06/23/23  0449 06/22/23  0503 06/21/23  1241 06/21/23  0410   HGB 7.0* 7.4* 7.4* 6.8*   WBC 10.3 14.4* 16.9* 17.3*   RBC 2.40* 2.39* 2.46* 2.27*   HCT 23.2* 23.8* 24.2* 22.2*   MCV 97 100 98 98   MCH 29.2 31.0 30.1 30.0   MCHC 30.2* 31.1* 30.6* 30.6*   RDW 19.4* 19.0* 19.3* 19.1*    235 215 212     INR  Recent Labs   Lab 06/23/23  0449 06/22/23  0813 06/20/23  0344 06/19/23  0354 06/18/23  1512 06/18/23  0343 06/17/23  0854   INR 1.03 1.04 1.08 1.09 1.11   < >  --    PTT  --   --   --   --  46*  --  40*    < > = values in this interval not displayed.     ABG  No lab results found in last 7 days.   Urine Studies  Recent Labs   Lab Test 06/16/23  1220 06/10/23  1613 06/05/23  1620 04/07/23  1246   COLOR  Yellow Orange* Yellow Yellow   APPEARANCE Cloudy* Cloudy* Clear Clear   URINEGLC 150* 30* Negative Negative   URINEBILI Negative Negative Negative Negative   URINEKETONE Negative Negative Negative Negative   SG 1.014 1.021 1.012 1.011   UBLD Large* Large* Moderate* Moderate*   URINEPH 5.5 5.5 5.5 5.5   PROTEIN 70* 100* 10* 10*   NITRITE Negative Negative Negative Negative   LEUKEST Large* Large* Negative Negative   RBCU >182* >182* 2 5*   WBCU 56* 128* 7* 2     No lab results found.  PTH  Recent Labs   Lab Test 05/19/23  0956   PTHI 67*     Iron Studies  Recent Labs   Lab Test 11/22/22  0848   IRON 68   *   IRONSAT 31   HOLA 429*       IMAGING:  All imaging studies reviewed by me.

## 2023-06-23 NOTE — PLAN OF CARE
Major Shift Events:  Alert and oriented x4, able to make needs known. HR between 's. RA all shift, no s/s of resp distress. Voids without difficulty. Uses urinal independently. Med BM this morning.   Plan: Transfer to  pending bed availability. Possible discharge to rehab this weekend. Continue to monitor and notify team of any changes.  For vital signs and complete assessments, please see documentation flowsheets.

## 2023-06-23 NOTE — PROGRESS NOTES
Admitted/transferred from: ICU  2 RN full   skin assessment completed by Jamila Josue RN and Cindy ARNDT  Skin assessment finding: issues found generalized brusing throughtout, generalized edema, blanchable redness on coccyx, open wound left upper back, scabs on abdominal area, BLE, R foot dressing from gout, Nodules on R/L lower knee and R toe (lateral). Abdoominal incision midline and lower abdominal incsion with staples EDUARDO. Infiltration on R elbow from PIV.   Interventions/actions: other dressing changed upper back, removed dressing on L lower arm, EDUARDO . R arm  covered with mepilex . R PIV removed and dressing applied.     Will continue to monitor.

## 2023-06-23 NOTE — PROGRESS NOTES
Transplant Social Work Services Progress Note      Date of Initial Social Work Evaluation: 11/22/2022  Collaborated with: [facility]    Data: Casey received a liver and kidney transplant on 6/6/23.  Intervention: PM&R consulted, and recommended TCU. [facility] started prior auth, but no anticipated beds over the weekend. PAS complete  Assessment: No new assessment   Education provided by SW: No new education   Plan:    Discharge Plans in Progress: [facility]    Barriers to d/c plan: Medical stability. Bed availability and prior auth    Follow up Plan: Transplant  to follow.      MOUNIKA Lyles, [facility]  Liver Transplant   [facility]  Phone: [phone]  Pager: [phone]

## 2023-06-24 ENCOUNTER — APPOINTMENT (OUTPATIENT)
Dept: OCCUPATIONAL THERAPY | Facility: CLINIC | Age: 68
End: 2023-06-24
Attending: INTERNAL MEDICINE
Payer: COMMERCIAL

## 2023-06-24 LAB
ALBUMIN SERPL BCG-MCNC: 2.7 G/DL (ref 3.5–5.2)
ALP SERPL-CCNC: 393 U/L (ref 40–129)
ALT SERPL W P-5'-P-CCNC: 100 U/L (ref 0–70)
ANION GAP SERPL CALCULATED.3IONS-SCNC: 17 MMOL/L (ref 7–15)
AST SERPL W P-5'-P-CCNC: 61 U/L (ref 0–45)
BASOPHILS # BLD MANUAL: 0 10E3/UL (ref 0–0.2)
BASOPHILS NFR BLD MANUAL: 0 %
BILIRUB DIRECT SERPL-MCNC: 0.67 MG/DL (ref 0–0.3)
BILIRUB SERPL-MCNC: 1 MG/DL
BUN SERPL-MCNC: 83.6 MG/DL (ref 8–23)
CALCIUM SERPL-MCNC: 9 MG/DL (ref 8.8–10.2)
CHLORIDE SERPL-SCNC: 88 MMOL/L (ref 98–107)
CREAT SERPL-MCNC: 3.07 MG/DL (ref 0.67–1.17)
DEPRECATED HCO3 PLAS-SCNC: 27 MMOL/L (ref 22–29)
EOSINOPHIL # BLD MANUAL: 0.1 10E3/UL (ref 0–0.7)
EOSINOPHIL NFR BLD MANUAL: 1 %
ERYTHROCYTE [DISTWIDTH] IN BLOOD BY AUTOMATED COUNT: 19 % (ref 10–15)
GFR SERPL CREATININE-BSD FRML MDRD: 22 ML/MIN/1.73M2
GLUCOSE BLDC GLUCOMTR-MCNC: 113 MG/DL (ref 70–99)
GLUCOSE BLDC GLUCOMTR-MCNC: 126 MG/DL (ref 70–99)
GLUCOSE BLDC GLUCOMTR-MCNC: 222 MG/DL (ref 70–99)
GLUCOSE BLDC GLUCOMTR-MCNC: 241 MG/DL (ref 70–99)
GLUCOSE BLDC GLUCOMTR-MCNC: 321 MG/DL (ref 70–99)
GLUCOSE BLDC GLUCOMTR-MCNC: 329 MG/DL (ref 70–99)
GLUCOSE SERPL-MCNC: 122 MG/DL (ref 70–99)
HCT VFR BLD AUTO: 22.9 % (ref 40–53)
HGB BLD-MCNC: 7.2 G/DL (ref 13.3–17.7)
INR PPP: 1.12 (ref 0.85–1.15)
LACTATE SERPL-SCNC: 1.6 MMOL/L (ref 0.7–2)
LYMPHOCYTES # BLD MANUAL: 0 10E3/UL (ref 0.8–5.3)
LYMPHOCYTES NFR BLD MANUAL: 0 %
MAGNESIUM SERPL-MCNC: 1.5 MG/DL (ref 1.7–2.3)
MCH RBC QN AUTO: 30.1 PG (ref 26.5–33)
MCHC RBC AUTO-ENTMCNC: 31.4 G/DL (ref 31.5–36.5)
MCV RBC AUTO: 96 FL (ref 78–100)
METAMYELOCYTES # BLD MANUAL: 0.1 10E3/UL
METAMYELOCYTES NFR BLD MANUAL: 1 %
MONOCYTES # BLD MANUAL: 0.2 10E3/UL (ref 0–1.3)
MONOCYTES NFR BLD MANUAL: 2 %
MYELOCYTES # BLD MANUAL: 0.2 10E3/UL
MYELOCYTES NFR BLD MANUAL: 2 %
NEUTROPHILS # BLD MANUAL: 9.5 10E3/UL (ref 1.6–8.3)
NEUTROPHILS NFR BLD MANUAL: 93 %
PHOSPHATE SERPL-MCNC: 4 MG/DL (ref 2.5–4.5)
PLAT MORPH BLD: ABNORMAL
PLATELET # BLD AUTO: 281 10E3/UL (ref 150–450)
POTASSIUM SERPL-SCNC: 3.9 MMOL/L (ref 3.4–5.3)
PROMYELOCYTES # BLD MANUAL: 0.1 10E3/UL
PROMYELOCYTES NFR BLD MANUAL: 1 %
PROT SERPL-MCNC: 5.9 G/DL (ref 6.4–8.3)
RBC # BLD AUTO: 2.39 10E6/UL (ref 4.4–5.9)
RBC MORPH BLD: ABNORMAL
SODIUM SERPL-SCNC: 132 MMOL/L (ref 136–145)
UFH PPP CHRO-ACNC: 0.22 IU/ML
UFH PPP CHRO-ACNC: 0.39 IU/ML
UFH PPP CHRO-ACNC: 0.41 IU/ML
WBC # BLD AUTO: 10.2 10E3/UL (ref 4–11)

## 2023-06-24 PROCEDURE — 250N000013 HC RX MED GY IP 250 OP 250 PS 637: Performed by: TRANSPLANT SURGERY

## 2023-06-24 PROCEDURE — 85520 HEPARIN ASSAY: CPT | Performed by: TRANSPLANT SURGERY

## 2023-06-24 PROCEDURE — 36592 COLLECT BLOOD FROM PICC: CPT | Performed by: TRANSPLANT SURGERY

## 2023-06-24 PROCEDURE — 82248 BILIRUBIN DIRECT: CPT | Performed by: SURGERY

## 2023-06-24 PROCEDURE — 250N000012 HC RX MED GY IP 250 OP 636 PS 637: Performed by: INTERNAL MEDICINE

## 2023-06-24 PROCEDURE — 80053 COMPREHEN METABOLIC PANEL: CPT | Performed by: SURGERY

## 2023-06-24 PROCEDURE — 250N000013 HC RX MED GY IP 250 OP 250 PS 637: Performed by: NURSE PRACTITIONER

## 2023-06-24 PROCEDURE — 85610 PROTHROMBIN TIME: CPT | Performed by: NURSE PRACTITIONER

## 2023-06-24 PROCEDURE — 250N000012 HC RX MED GY IP 250 OP 636 PS 637: Performed by: PHYSICIAN ASSISTANT

## 2023-06-24 PROCEDURE — 250N000013 HC RX MED GY IP 250 OP 250 PS 637: Performed by: INTERNAL MEDICINE

## 2023-06-24 PROCEDURE — 36415 COLL VENOUS BLD VENIPUNCTURE: CPT | Performed by: SURGERY

## 2023-06-24 PROCEDURE — 83605 ASSAY OF LACTIC ACID: CPT | Performed by: TRANSPLANT SURGERY

## 2023-06-24 PROCEDURE — 36415 COLL VENOUS BLD VENIPUNCTURE: CPT | Performed by: TRANSPLANT SURGERY

## 2023-06-24 PROCEDURE — 85007 BL SMEAR W/DIFF WBC COUNT: CPT | Performed by: SURGERY

## 2023-06-24 PROCEDURE — 250N000011 HC RX IP 250 OP 636: Mod: JZ | Performed by: NURSE PRACTITIONER

## 2023-06-24 PROCEDURE — 99233 SBSQ HOSP IP/OBS HIGH 50: CPT | Mod: 24 | Performed by: NURSE PRACTITIONER

## 2023-06-24 PROCEDURE — 84100 ASSAY OF PHOSPHORUS: CPT | Performed by: SURGERY

## 2023-06-24 PROCEDURE — 97530 THERAPEUTIC ACTIVITIES: CPT | Mod: GO

## 2023-06-24 PROCEDURE — 250N000013 HC RX MED GY IP 250 OP 250 PS 637: Performed by: PHYSICIAN ASSISTANT

## 2023-06-24 PROCEDURE — 250N000012 HC RX MED GY IP 250 OP 636 PS 637: Performed by: TRANSPLANT SURGERY

## 2023-06-24 PROCEDURE — 120N000011 HC R&B TRANSPLANT UMMC

## 2023-06-24 PROCEDURE — 85027 COMPLETE CBC AUTOMATED: CPT | Performed by: SURGERY

## 2023-06-24 PROCEDURE — 250N000011 HC RX IP 250 OP 636: Mod: JZ | Performed by: RADIOLOGY

## 2023-06-24 PROCEDURE — 83735 ASSAY OF MAGNESIUM: CPT | Performed by: SURGERY

## 2023-06-24 RX ORDER — MAGNESIUM OXIDE 400 MG/1
400 TABLET ORAL DAILY
Status: DISCONTINUED | OUTPATIENT
Start: 2023-06-24 | End: 2023-06-25 | Stop reason: HOSPADM

## 2023-06-24 RX ORDER — BUMETANIDE 2 MG/1
2 TABLET ORAL 3 TIMES DAILY
Status: DISCONTINUED | OUTPATIENT
Start: 2023-06-24 | End: 2023-06-24

## 2023-06-24 RX ORDER — WARFARIN SODIUM 2 MG/1
2 TABLET ORAL
Status: COMPLETED | OUTPATIENT
Start: 2023-06-24 | End: 2023-06-24

## 2023-06-24 RX ORDER — PREDNISONE 10 MG/1
10 TABLET ORAL DAILY
Status: DISCONTINUED | OUTPATIENT
Start: 2023-06-30 | End: 2023-06-25 | Stop reason: HOSPADM

## 2023-06-24 RX ORDER — BUMETANIDE 2 MG/1
2 TABLET ORAL 3 TIMES DAILY
Status: DISCONTINUED | OUTPATIENT
Start: 2023-06-24 | End: 2023-06-25 | Stop reason: HOSPADM

## 2023-06-24 RX ORDER — NYSTATIN 100000/ML
500000 SUSPENSION, ORAL (FINAL DOSE FORM) ORAL 4 TIMES DAILY
Status: DISCONTINUED | OUTPATIENT
Start: 2023-06-24 | End: 2023-06-25 | Stop reason: HOSPADM

## 2023-06-24 RX ORDER — PREDNISONE 20 MG/1
20 TABLET ORAL DAILY
Status: DISCONTINUED | OUTPATIENT
Start: 2023-06-27 | End: 2023-06-25 | Stop reason: HOSPADM

## 2023-06-24 RX ADMIN — MYCOPHENOLATE MOFETIL 750 MG: 250 CAPSULE ORAL at 08:34

## 2023-06-24 RX ADMIN — ASPIRIN 325 MG: 325 TABLET ORAL at 08:21

## 2023-06-24 RX ADMIN — WARFARIN SODIUM 2 MG: 2 TABLET ORAL at 17:29

## 2023-06-24 RX ADMIN — NYSTATIN 500000 UNITS: 100000 SUSPENSION ORAL at 19:50

## 2023-06-24 RX ADMIN — INSULIN GLARGINE 10 UNITS: 100 INJECTION, SOLUTION SUBCUTANEOUS at 21:41

## 2023-06-24 RX ADMIN — ATORVASTATIN CALCIUM 20 MG: 20 TABLET, FILM COATED ORAL at 19:33

## 2023-06-24 RX ADMIN — BUMETANIDE 2 MG: 2 TABLET ORAL at 19:33

## 2023-06-24 RX ADMIN — NYSTATIN 500000 UNITS: 100000 SUSPENSION ORAL at 10:08

## 2023-06-24 RX ADMIN — B-COMPLEX W/ C & FOLIC ACID TAB 1 MG 1 TABLET: 1 TAB at 08:22

## 2023-06-24 RX ADMIN — URSODIOL 250 MG: 250 TABLET, FILM COATED ORAL at 08:21

## 2023-06-24 RX ADMIN — URSODIOL 250 MG: 250 TABLET, FILM COATED ORAL at 19:33

## 2023-06-24 RX ADMIN — MYCOPHENOLATE MOFETIL 750 MG: 250 CAPSULE ORAL at 17:29

## 2023-06-24 RX ADMIN — NYSTATIN 500000 UNITS: 100000 SUSPENSION ORAL at 12:44

## 2023-06-24 RX ADMIN — PANTOPRAZOLE SODIUM 40 MG: 40 TABLET, DELAYED RELEASE ORAL at 08:21

## 2023-06-24 RX ADMIN — HEPARIN SODIUM 2000 UNITS/HR: 10000 INJECTION, SOLUTION INTRAVENOUS at 08:24

## 2023-06-24 RX ADMIN — HEPARIN SODIUM 1700 UNITS/HR: 10000 INJECTION, SOLUTION INTRAVENOUS at 22:52

## 2023-06-24 RX ADMIN — TACROLIMUS 2 MG: 1 CAPSULE ORAL at 08:34

## 2023-06-24 RX ADMIN — TACROLIMUS 2 MG: 1 CAPSULE ORAL at 17:28

## 2023-06-24 RX ADMIN — METHOCARBAMOL 500 MG: 500 TABLET ORAL at 16:16

## 2023-06-24 RX ADMIN — PREDNISONE 30 MG: 20 TABLET ORAL at 08:20

## 2023-06-24 RX ADMIN — Medication 3 ML: at 19:33

## 2023-06-24 RX ADMIN — Medication 12.5 MG: at 06:53

## 2023-06-24 RX ADMIN — MAGNESIUM OXIDE TAB 400 MG (241.3 MG ELEMENTAL MG) 400 MG: 400 (241.3 MG) TAB at 08:21

## 2023-06-24 RX ADMIN — BUMETANIDE 2 MG: 2 TABLET ORAL at 08:21

## 2023-06-24 RX ADMIN — Medication 12.5 MG: at 14:36

## 2023-06-24 RX ADMIN — ALLOPURINOL 100 MG: 100 TABLET ORAL at 08:21

## 2023-06-24 RX ADMIN — BUMETANIDE 2 MG: 2 TABLET ORAL at 16:16

## 2023-06-24 RX ADMIN — Medication 12.5 MG: at 21:40

## 2023-06-24 ASSESSMENT — ACTIVITIES OF DAILY LIVING (ADL)
ADLS_ACUITY_SCORE: 36
ADLS_ACUITY_SCORE: 29
ADLS_ACUITY_SCORE: 36
ADLS_ACUITY_SCORE: 29
ADLS_ACUITY_SCORE: 36
ADLS_ACUITY_SCORE: 29
ADLS_ACUITY_SCORE: 36

## 2023-06-24 NOTE — PROGRESS NOTES
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 Increasing UOP.Last iHD on 6/19. Good urine output with diuretic. Monitor urine output.    - Baseline Creatinine: ~ TBD   - Proteinuria: Not checked post transplant   - Date DSA Last Checked: Jun/2023      Latest DSA: Low level DSA (<1000 mfi) to CW9,    - BK Viremia: Not checked post transplant   - Kidney Tx Biopsy: Jun 20, 2023; Result:  ATN, no evidence of rejection  - HD Info  Access: Temporary Catheter RIJ, Days: TBD, Length: 3.0 hrs, 2-3L UF Heparin: No, FEDERICO: No, IV Iron: No, Vit D analog: No  -No indication for HD today and likely with renal recovery   - will monitor HD need but currently no need to arrange for tunneled line. 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  Slow trend up in LFTs.  Followed by Transplant Surgery.     # Immunosuppression: Tacrolimus immediate release (goal 5-8), Mycophenolate mofetil (dose 750 mg every 12 hours) and Prednisone (dose 30 mg daily) due to gout    - Induction with Recent Transplant:  rATG total 4mg/kg, stopped due to sepsis   - Changes: Not at this time. Plan to wean pred down to 5mg daily once tac goal is increased (after renal recovery), higher prednisone dose in setting of gout.     # Infection Prophylaxis:   - PJP: Sulfa/TMP (Bactrim)  -  CMV: Valganciclovir (Valcyte);CMV IgG Ab positive  - Thrush: Micafungin (Mycamine)  - Fungal: Micafungin (Mycamine)    # Hypotension, resolved: Controlled, BP increasing Goal BP: < 130/80   - Volume status: Moderate hypervolemia  EDW ~ 96.4 kg   - Changes: No, continue bumex 2mg PO BID     -Continue metoprolol 12.5mg q8h for afib     # Elevated Blood Glucose: Glucose generally running ~ 140-180s'   - On insulin prn.   - Management as per primary team.    # Anemia in Chronic Renal Disease/Surgery: Hgb: Stable, low      FEDERICO: No   - Iron studies: Replete    - Transfusion for Hgb <7 mg/dl, management per primary team    # Mineral Bone Disorder:   - Secondary renal hyperparathyroidism; PTH level: Minimally elevated ( pg/ml)        On treatment: None  - Vitamin D; level: Normal        On supplement: No  - Calcium; level: Normal       On supplement: No  - Phosphorus; level: Normal     On binder: No    # Electrolytes:   - Potassium; level: Normal        On supplement: No, replete  - Magnesium; level: Low        On supplement: Yes, should start on magnesium 800mg po   - Bicarbonate; level: Normal        On supplement: Yes  - Sodium; level: Low    # Paroxysmal A-Fib: Stable heart rate on beta blocker and anticoagulation with IV heparin.   -Transitioning to warfarin     # History of C.diff: Continues with loose stools, but negative C. diff with last check 6/11.    # E. Faecium Bacteremia: Diagnosed on 6/9 BlCx. Treated with vancomycin thru 6/20    # ANCA Vasculitis: S/p Rituximab x2   - Will be on acute GN protocol post transplant.    # Gout: On prednisone 10 mg PO daily PTA. Currently on prednisone 30 with gout flare with plan to eventually wean to prednisone 5 once kidney is working and tac increased >8   -Continue allopurinol 100mg daily    - ok to give colchicine 0.3mg once and will check uric acid level    - should follow up with rheumatology. Consider consulting rheumatology inpatient    # Transplant  History:  Etiology of Kidney Failure: IgA nephropathy and ANCA vasculitis  Tx: Liver Tx (SLK)  Transplant: 6/6/2023 (Liver), 6/6/2023 (Kidney)  Significant changes in immunosuppression: None  Significant transplant-related complications: None    Recommendations were communicated to the primary team verbally         Physician Attestation     I saw and evaluated Damion Quinones as part of a shared APRN/PA visit.     I personally reviewed the vital signs, medications, labs and imaging.    I personally performed the substantive portion of the medical decision making for this visit - please see the GEORGE's documentation for full details.    Key management decisions made by me and carried out under my direction: renal function improving, still with signs of fluid overload and would increase bumex to 2mg po tid. keep R HD cath pending course of renal recovery this week. No HD indications today    Darrel Patino MD  Date of Service (when I saw the patient): 06/24/23    Interval History   Mr. Quinones's creatinine is 3.07 (06/24 0731); Stable.  Good urine output.  Other significant labs/tests/vitals: VSS  No events overnight.  No chest pain or shortness of breath.  +2 leg swelling.  No nausea and vomiting.  Bowel movements are soft.  No fever, sweats or chills.      Review of Systems   4 point ROS was obtained and negative except as noted in the Interval History.    MEDICATIONS:    allopurinol  100 mg Oral Daily     aspirin  325 mg Oral or Feeding Tube Daily     atorvastatin  20 mg Oral or Feeding Tube QPM     bumetanide  2 mg Oral BID     diclofenac  4 g Topical TID     heparin  3 mL Intracatheter Q24H     insulin aspart   Subcutaneous TID w/meals     insulin aspart  1-12 Units Subcutaneous Q4H     insulin glargine  10 Units Subcutaneous At Bedtime     magnesium oxide  400 mg Oral Daily     metoprolol tartrate  12.5 mg Oral or Feeding Tube Q8H KARAN     multivitamin RENAL  1 tablet Oral or Feeding Tube Daily     mycophenolate  750  "mg Oral BID IS     nystatin  500,000 Units Oral 4x Daily     pantoprazole  40 mg Oral QAM AC     [START ON 2023] predniSONE  10 mg Per Feeding Tube Daily     predniSONE  30 mg Oral Daily    Followed by     [START ON 2023] predniSONE  20 mg Oral Daily     sodium chloride (PF)  10-40 mL Intracatheter Q7 Days     sodium chloride (PF)  3 mL Intracatheter Q8H     sodium chloride (PF)  3 mL Intravenous Q8H     sulfamethoxazole-trimethoprim  1 tablet Oral or Feeding Tube Once per day on      tacrolimus  2 mg Oral BID IS     ursodiol  250 mg Oral or Feeding Tube BID     valGANciclovir  450 mg Oral Once per day on      warfarin ANTICOAGULANT  2 mg Oral ONCE at 18:00     Warfarin Therapy Reminder  1 each Oral See Admin Instructions       dextrose       heparin 1,850 Units/hr (23 0853)       Physical Exam   Temp  Av.9  F (36.6  C)  Min: 97  F (36.1  C)  Max: 99.2  F (37.3  C)  Arterial Line BP  Min: 60/45  Max: 131/80  Arterial Line MAP (mmHg)  Av mmHg  Min: 52 mmHg  Max: 101 mmHg      Pulse  Av.5  Min: 68  Max: 159 Resp  Av.8  Min: 14  Max: 23  FiO2 (%)  Av.3 %  Min: 40 %  Max: 50 %  SpO2  Av.1 %  Min: 92 %  Max: 100 %    CVP (mmHg): 6 mmHgBP 108/76 (BP Location: Left arm)   Pulse 107   Temp 98.2  F (36.8  C) (Oral)   Resp 17   Ht 1.702 m (5' 7\")   Wt 98.6 kg (217 lb 6 oz)   SpO2 97%   BMI 34.05 kg/m      Admit Weight: 102.3 kg (225 lb 8 oz)     GENERAL APPEARANCE: alert and no distress  RESP: lungs clear to auscultation - no rales, rhonchi or wheezes  CV: regular rhythm, normal rate, no rub, no murmur  EDEMA: trace to 1+ LE edema bilaterally, but improved from previous days.   ABDOMEN: soft, nondistended, mildly tender, bowel sounds normal  MS: extremities normal - no gross deformities noted, no evidence of inflammation in joints, no muscle tenderness  SKIN: no rash but multiple hematomas on several parts of his body including neck, arm slowly fading. "   TX KIDNEY: mild TTP    Data   All labs reviewed by me.  CMP  Recent Labs   Lab 06/24/23  0833 06/24/23  0731 06/24/23  0415 06/24/23  0034 06/23/23  0800 06/23/23  0449 06/22/23  1937 06/22/23  1623 06/22/23  0814 06/22/23  0503 06/21/23  0910 06/21/23  0410   NA  --  132*  --   --   --  132*  --   --   --  131*  --  130*   POTASSIUM  --  3.9  --   --   --  4.0  --  4.5  --  3.4  --  3.6   CHLORIDE  --  88*  --   --   --  90*  --   --   --  90*  --  92*   CO2  --  27  --   --   --  25  --   --   --  23  --  23   ANIONGAP  --  17*  --   --   --  17*  --   --   --  18*  --  15   * 122* 126* 222*   < > 106*   < >  --    < > 156*   < > 204*   BUN  --  83.6*  --   --   --  80.9*  --   --   --  73.1*  --  51.6*   CR  --  3.07*  --   --   --  3.21*  --   --   --  3.02*  --  2.52*   GFRESTIMATED  --  22*  --   --   --  21*  --   --   --  22*  --  28*   NAZARIO  --  9.0  --   --   --  8.9  --   --   --  9.0  --  8.8   MAG  --  1.5*  --   --   --  1.6*  --   --   --  1.7  --  1.8   PHOS  --  4.0  --   --   --  3.3  --   --   --  3.9  --  3.5   PROTTOTAL  --  5.9*  --   --   --  5.7*  --   --   --  5.6*  --  5.3*   ALBUMIN  --  2.7*  --   --   --  2.7*  --   --   --  2.4*  --  2.4*   BILITOTAL  --  1.0  --   --   --  1.0  --   --   --  1.0  --  1.2   ALKPHOS  --  393*  --   --   --  315*  --   --   --  298*  --  286*   AST  --  61*  --   --   --  49*  --   --   --  48*  --  49*   ALT  --  100*  --   --   --  82*  --   --   --  83*  --  84*    < > = values in this interval not displayed.     CBC  Recent Labs   Lab 06/24/23  0731 06/23/23  0449 06/22/23  0503 06/21/23  1241   HGB 7.2* 7.0* 7.4* 7.4*   WBC 10.2 10.3 14.4* 16.9*   RBC 2.39* 2.40* 2.39* 2.46*   HCT 22.9* 23.2* 23.8* 24.2*   MCV 96 97 100 98   MCH 30.1 29.2 31.0 30.1   MCHC 31.4* 30.2* 31.1* 30.6*   RDW 19.0* 19.4* 19.0* 19.3*    252 235 215     INR  Recent Labs   Lab 06/24/23  0731 06/23/23  0449 06/22/23  0813 06/20/23  0344 06/19/23  0354  06/18/23  1512   INR 1.12 1.03 1.04 1.08   < > 1.11   PTT  --   --   --   --   --  46*    < > = values in this interval not displayed.     ABG  No lab results found in last 7 days.   Urine Studies  Recent Labs   Lab Test 06/16/23  1220 06/10/23  1613 06/05/23  1620 04/07/23  1246   COLOR Yellow Orange* Yellow Yellow   APPEARANCE Cloudy* Cloudy* Clear Clear   URINEGLC 150* 30* Negative Negative   URINEBILI Negative Negative Negative Negative   URINEKETONE Negative Negative Negative Negative   SG 1.014 1.021 1.012 1.011   UBLD Large* Large* Moderate* Moderate*   URINEPH 5.5 5.5 5.5 5.5   PROTEIN 70* 100* 10* 10*   NITRITE Negative Negative Negative Negative   LEUKEST Large* Large* Negative Negative   RBCU >182* >182* 2 5*   WBCU 56* 128* 7* 2     No lab results found.  PTH  Recent Labs   Lab Test 05/19/23  0956   PTHI 67*     Iron Studies  Recent Labs   Lab Test 11/22/22  0848   IRON 68   *   IRONSAT 31   HOLA 429*       IMAGING:  All imaging studies reviewed by me.

## 2023-06-24 NOTE — PLAN OF CARE
"/76 (BP Location: Left arm)   Pulse 107   Temp 98.2  F (36.8  C) (Oral)   Resp 17   Ht 1.702 m (5' 7\")   Wt 98.6 kg (217 lb 6 oz)   SpO2 97%   BMI 34.05 kg/m      Shift: 0700-+1530  Isolation Status: Contact for VRE  VS: Intermittent Tachy on RA, afebrile  Neuro: Aox4  Behaviors: Calm, pleasant. Wife at bedside and attentive  BG: Q4. 123, 241  Labs: Mg 1.5. Xa drawn at 1450  Respiratory: Diminished in bases. Encourage use of IS  Cardiac: Intermittent tachy  Pain/Nausea: Denies pain or nausea  PRN: None  Diet: Regular with betsy counts and carb coverage. Appetite fair.  IV Access: R PIV. L double lumen PICC SL.  Infusion(s): Heparin in R PIV, RN managed.   Lines/Drains: None  GI/: BM x1 this shift. Voiding without difficulty into urinal.  Skin: Jimmie skin, generalized bruising. L arm skin tear covered with Kerlix. R foot wound d/t gout. Abdominal clamshell incision and R hockey stick incision, stapled and EDUARDO.  Mobility: Heavy Ax2 with lift.  Events/Education: Med card and Lab book started, discussed with patient and wife. Worked with OT today. Got up to wheelchair and went outside.  Plan: Encourage patient to work on strengthening and independence. Continue to monitor.    "

## 2023-06-24 NOTE — PROGRESS NOTES
Transplant Surgery  Inpatient Daily Progress Note  2023    Assessment & Plan: 67 year old male with PMHx of decompensated alcohol + hemochromatosis (homozygous H63D) cirrhosis complicated by variceal bleeding s/p TIPS (10/1/2022) and hepatic encephalopathy + CKD (IgA nephropathy + ANCA vasculitis) s/p simultaneous liver kidney transplant on 2023. RTOR on  with concern for low flow in the renal graft - ex-lap, washout, closure with healthy appearing graft with intact arterial and venous flow.     s/p DBD simultaneous liver kidney transplant on 2023 with complex reconstruction of accessory right hepatic artery:  * RTOR on  with concern for low flow in the renal graft - ex-lap, washout, closure with healthy appearing graft with intact arterial and venous flow.   - 23 Liver ultrasound with doppler: low RI right and left HA again seen, no significant change. 2 new fluid collections, possible hematoma.   Stent: No biliary stent in place. Ursodiol 250 mg BID.  Vascular ppx:  mg daily    Liver studies appropriate     H/o CKD related IgA nephropathy and ANCA vasculitis s/p  donor renal transplant on 2023 with ureteral/J stent placement, delayed graft function:   Last renal US with doppler  with faster than expected iliac vein velocity above the anastomosis may represent edema and elevated superior arcuate artery resistive index. Renal biopsy 2023: ATN, no evidence of rejection  - Renal replacement therapy: CRRT -. Intermittent HD on . Temporary HD line in-place.   - No plan for renal replacement therapy today  - Diuretics: Bumex 2 mg PO BID  -    Metabolic acidosis: Resolved. Sodium bicarbonate stopped.     Immunosuppressed status secondary to medications:   Induction: Steroid taper and Thymoglobulin 400 mg (4 mg/kg) complete.   Maintenance:   -  mg BID  - Tacrolimus: increase to 2 mg BID; goal 5-8 given slow renal recovery  - Resumed PTA  prednisone for rheumatoid arthritis (see below). Goal to wean down to 5 mg prednisone for chronic use upon discharge    Hematology:   Acute on chronic anemia: Last transfused 6/17. Hgb stable 7-8 without any s/sx of bleeding.     Leukocytosis, RESOLVED: Afebrile. CT chest 6/16 with bibasilar atelectasis + small bilateral pleural effusion. Patchy densities in the left upper lobe anterolaterally. Infectious workup negative to date. Breathing comfortably on ambient air.     RIJ clot: US 6/15 with partially occlusive inferior R internal jugular thrombus (previously partially occlusive thrombus in right axillary resolved).   - Heparin gtt with transition to warfarin (goal INR 1.5-2). Warfarin dosing per pharmacy. INR goal 1.5-2, current< 1.5.     Cardiology:  Coronary artery disease: Continue  mg daily and atorvastatin 20 mg daily.      Paroxysmal atrial fibrillation: Cardiology consulted.  -Metoprolol 12.5 mg PO q8h + metoprolol 5mg IV PRN for sustained tachycardia > 120.   -Continuous rate: low intensity heparin gtt ordered.   -Transition from heparin gtt to warfarin: INR goal 1.5-2     Hypotension; RESOLVED: 6/10 Echo: EF 55-60%. Intermittently required Vasopressors - now off (s/p 6/6-6/12, 6/15-6/17).    Hypoxia, RESOLVED: Likely related to fluid overload and deconditioning. Resolved with diuresis and renal replacement therapy    GI/Nutrition:   Severe malnutrition in the context of acute illness: Nutrition consulted. S/p TF, removed 6/22/2023. Regular diet with supplements. Calorie counts improving.     Bowel regimen: Miralax PRN + senna PRN     Endocrine:   Steroid/TF induced hyperglycemia: HgA1c 6.1% (6/6/2023). No history of DM or steroid hyperglycemia (on daily prednisone PTA).  - Lantus 10 units  - Sliding scale Novolog q4h. High insulin resistance correction scale  - Add carb coverage with meals given steroid taper.   - Hypoglycemia monitoring per protocol  - Consider endocrine consult Monday if BG not  improved with stopping TF and adjusting insulin regimen.     Rheumatology:  Psoriatic arthritis: Prior to admission was on prednisone 10 mg daily.   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 Recommend higher dose of steroid taper (40 mg x 3 days then 30 mg x 3 days then 20 mg x 3 days then 10 mg daily (further taper per transplant). Follow up with primary rhumatologist Dr. Sorenson outpatient (currently working at [facility] ) and can reach out to schedule an appointment.     Neuro/Psych:  Acute post operative pain:                * Methocarbamol 500 mg q6h PRN              * Oxycodone 2.5 mg q4h PRN   * Voltaren gel    Alcohol use disorder: Continue sober supports post transplant. Continue complete sobriety.    MSK:  Deconditioning/Weakness: OT and PT optimization. Currently recommending TCU.     Infectious disease:  Hepatitis B s Ag +: Noted to be positive pre-transplant on 6/5/23, but not previously positive (1/26/2023). HBV DNA negative on 6/5/23. No clear at risk behavior. Repeat Hep B s Ag was negative and HBV DNA not detected; suspect 6/5/2023 HBsAg positivity was a false positive.      Prophylaxis: DVT (heparin as above), fall, GI (PPI BID), CMV (valganciclovir, CMV IgG Ab +), PJP (Bactrim)     Disposition: Transfer to  when bed is available. Continue transplant education. Daily PT/OT, currently recommending TCU.    GEORGE/Fellow/Resident Provider: LUCIO Malik [id number]    Faculty: Dr. Conor Dc  _________________________________________________________________    Interval History:   Overnight events: No acute events overnight.  - UOP: ~3L + 1x unmeasured  - Breathing comfortably on ambient air  - 3 bowel movements in the last 24 hours         ROS:   A 10-point review  "of systems was negative except as noted above.    Meds:    allopurinol  100 mg Oral Daily     aspirin  325 mg Oral or Feeding Tube Daily     atorvastatin  20 mg Oral or Feeding Tube QPM     bumetanide  2 mg Oral BID     diclofenac  4 g Topical TID     heparin  3 mL Intracatheter Q24H     insulin aspart  1-12 Units Subcutaneous Q4H     insulin glargine  10 Units Subcutaneous At Bedtime     magnesium oxide  400 mg Oral Daily     metoprolol tartrate  12.5 mg Oral or Feeding Tube Q8H KARAN     multivitamin RENAL  1 tablet Oral or Feeding Tube Daily     mycophenolate  750 mg Oral BID IS     pantoprazole  40 mg Oral QAM AC     predniSONE  10 mg Per Feeding Tube Daily     sodium chloride (PF)  10-40 mL Intracatheter Q7 Days     sodium chloride (PF)  3 mL Intracatheter Q8H     sodium chloride (PF)  3 mL Intravenous Q8H     sulfamethoxazole-trimethoprim  1 tablet Oral or Feeding Tube Once per day on Mon Wed Fri     tacrolimus  2 mg Oral BID IS     ursodiol  250 mg Oral or Feeding Tube BID     valGANciclovir  450 mg Oral Once per day on Mon Fri     Warfarin Therapy Reminder  1 each Oral See Admin Instructions       Physical Exam:     Admit Weight: 102.3 kg (225 lb 8 oz)    Current vitals:   /79 (BP Location: Left leg)   Pulse 94   Temp 98.2  F (36.8  C) (Oral)   Resp 14   Ht 1.702 m (5' 7\")   Wt 98.6 kg (217 lb 6 oz)   SpO2 97%   BMI 34.05 kg/m      Vital sign ranges:    Temp:  [98.1  F (36.7  C)-98.7  F (37.1  C)] 98.2  F (36.8  C)  Pulse:  [] 94  Resp:  [14-22] 14  BP: (110-138)/(79-99) 138/79  SpO2:  [97 %-100 %] 97 %    General Appearance: Comfortable, no acute distress  Skin: No jaundice   Heart: Atrial fibrillation   Lungs: Breathing comfortably on ambient air  Abdomen: Obese, surgical scar (liver + kidney) - clean, dry and intact.  Extremities: 2+ edema in upper and lower extremities bilaterally  Neurologic: A&Ox4.     Data:   CMP  Recent Labs   Lab 06/24/23  0415 06/24/23  0034 06/23/23  0800 " 06/23/23  0449 06/22/23  1937 06/22/23  1623 06/22/23  0814 06/22/23  0503 06/20/23  0352 06/20/23  0344 06/19/23  0359 06/19/23 0354   NA  --   --   --  132*  --   --   --  131*   < > 130*  --  136   POTASSIUM  --   --   --  4.0  --  4.5  --  3.4   < > 3.4  --  3.8   CHLORIDE  --   --   --  90*  --   --   --  90*   < > 95*  --  102   CO2  --   --   --  25  --   --   --  23   < > 22  --  22   * 222*   < > 106*   < >  --    < > 156*   < > 170*   < > 161*   BUN  --   --   --  80.9*  --   --   --  73.1*   < > 26.1*  --  21.5   CR  --   --   --  3.21*  --   --   --  3.02*   < > 1.51*  --  1.07   GFRESTIMATED  --   --   --  21*  --   --   --  22*   < > 51*  --  77   NAZARIO  --   --   --  8.9  --   --   --  9.0   < > 8.7*  --  8.3*   ICAW  --   --   --   --   --   --   --   --   --  4.7  --  4.4   MAG  --   --   --  1.6*  --   --   --  1.7   < > 1.7  --  2.1   PHOS  --   --   --  3.3  --   --   --  3.9   < > 2.6  --  3.7   ALBUMIN  --   --   --  2.7*  --   --   --  2.4*   < > 2.6*  --  2.6*   BILITOTAL  --   --   --  1.0  --   --   --  1.0   < > 2.2*  --  1.6*   ALKPHOS  --   --   --  315*  --   --   --  298*   < > 323*  --  240*   AST  --   --   --  49*  --   --   --  48*   < > 88*  --  58*   ALT  --   --   --  82*  --   --   --  83*   < > 93*  --  82*    < > = values in this interval not displayed.     CBC  Recent Labs   Lab 06/23/23  0449 06/22/23  0503   HGB 7.0* 7.4*   WBC 10.3 14.4*    235

## 2023-06-24 NOTE — PROGRESS NOTES
Calorie Count  Intake recorded for: 6/23  Total Kcals: 0 Total Protein: 0g  Kcals from Hospital Food: 0   Protein: 0g  Kcals from Outside Food (average): 0  Protein: 0g  # Meals Ordered from Kitchen: 3 meals  # Meals Recorded: No food intake recorded  # Supplements Recorded: No intake recorded    Note: Assessment showed 75% intake at 8am, 12pm, and 3:16pm, but not enough information given to calculate Calories and protein.

## 2023-06-24 NOTE — PLAN OF CARE
"/79 (BP Location: Left leg)   Pulse 94   Temp 98.2  F (36.8  C) (Oral)   Resp 14   Ht 1.702 m (5' 7\")   Wt 98.6 kg (217 lb 6 oz)   SpO2 97%   BMI 34.05 kg/m      Shift: 9147-2188  VS: VSS on RA, afebrile  Neuro: AOx4  BG: Q4h (PM lantus): 334,222,126  Labs: am labs  Respiratory: WNL  Pain/Nausea/PRN: denies  Diet: regular  LDA: PIV infusing heparin, HD line, PICC hep locked  GI/: voids in urinal, no BM overnight  Skin: scattered bruising, gout lesions (knees, R forearm, R foot), stapled incisions  Mobility: lift (only pivoting with PT)  Plan: continue POC    Handoff given to following RN.                          "

## 2023-06-25 ENCOUNTER — HOSPITAL ENCOUNTER (INPATIENT)
Facility: SKILLED NURSING FACILITY | Age: 68
LOS: 32 days | Discharge: HOME | End: 2023-07-27
Attending: INTERNAL MEDICINE | Admitting: INTERNAL MEDICINE
Payer: COMMERCIAL

## 2023-06-25 ENCOUNTER — APPOINTMENT (OUTPATIENT)
Dept: PHYSICAL THERAPY | Facility: CLINIC | Age: 68
End: 2023-06-25
Attending: INTERNAL MEDICINE
Payer: COMMERCIAL

## 2023-06-25 VITALS
WEIGHT: 217.15 LBS | HEIGHT: 67 IN | HEART RATE: 102 BPM | SYSTOLIC BLOOD PRESSURE: 129 MMHG | BODY MASS INDEX: 34.08 KG/M2 | OXYGEN SATURATION: 97 % | DIASTOLIC BLOOD PRESSURE: 79 MMHG | RESPIRATION RATE: 17 BRPM | TEMPERATURE: 98.6 F

## 2023-06-25 DIAGNOSIS — N18.5 CKD (CHRONIC KIDNEY DISEASE) STAGE 5, GFR LESS THAN 15 ML/MIN (H): ICD-10-CM

## 2023-06-25 DIAGNOSIS — Z94.0 KIDNEY TRANSPLANT RECIPIENT: ICD-10-CM

## 2023-06-25 DIAGNOSIS — E83.110 HEREDITARY HEMOCHROMATOSIS (H): ICD-10-CM

## 2023-06-25 DIAGNOSIS — G89.18 ACUTE POST-OPERATIVE PAIN: ICD-10-CM

## 2023-06-25 DIAGNOSIS — T38.0X5A STEROID-INDUCED HYPERGLYCEMIA: ICD-10-CM

## 2023-06-25 DIAGNOSIS — R73.9 STEROID-INDUCED HYPERGLYCEMIA: ICD-10-CM

## 2023-06-25 DIAGNOSIS — I25.10 CAD (CORONARY ARTERY DISEASE): ICD-10-CM

## 2023-06-25 DIAGNOSIS — R29.898 MUSCULAR DECONDITIONING: ICD-10-CM

## 2023-06-25 DIAGNOSIS — M10.9 ACUTE GOUTY ARTHRITIS: ICD-10-CM

## 2023-06-25 DIAGNOSIS — R53.81 PHYSICAL DECONDITIONING: ICD-10-CM

## 2023-06-25 DIAGNOSIS — M1A.9XX1 TOPHACEOUS GOUT: ICD-10-CM

## 2023-06-25 DIAGNOSIS — Z94.4 LIVER TRANSPLANT RECIPIENT (H): Primary | ICD-10-CM

## 2023-06-25 DIAGNOSIS — K70.31 ALCOHOLIC CIRRHOSIS OF LIVER WITH ASCITES (H): ICD-10-CM

## 2023-06-25 DIAGNOSIS — E43 SEVERE MALNUTRITION (H): ICD-10-CM

## 2023-06-25 DIAGNOSIS — D84.9 IMMUNOSUPPRESSED STATUS (H): ICD-10-CM

## 2023-06-25 DIAGNOSIS — Z98.890 STATUS POST CORONARY ANGIOGRAM: ICD-10-CM

## 2023-06-25 LAB
ALBUMIN SERPL BCG-MCNC: 3 G/DL (ref 3.5–5.2)
ALP SERPL-CCNC: 429 U/L (ref 40–129)
ALT SERPL W P-5'-P-CCNC: 118 U/L (ref 0–70)
ANION GAP SERPL CALCULATED.3IONS-SCNC: 17 MMOL/L (ref 7–15)
AST SERPL W P-5'-P-CCNC: 58 U/L (ref 0–45)
BASOPHILS # BLD MANUAL: 0 10E3/UL (ref 0–0.2)
BASOPHILS NFR BLD MANUAL: 0 %
BILIRUB DIRECT SERPL-MCNC: 0.62 MG/DL (ref 0–0.3)
BILIRUB SERPL-MCNC: 0.9 MG/DL
BUN SERPL-MCNC: 89.2 MG/DL (ref 8–23)
CALCIUM SERPL-MCNC: 9.2 MG/DL (ref 8.8–10.2)
CHLORIDE SERPL-SCNC: 88 MMOL/L (ref 98–107)
CREAT SERPL-MCNC: 2.98 MG/DL (ref 0.67–1.17)
DEPRECATED HCO3 PLAS-SCNC: 29 MMOL/L (ref 22–29)
ELLIPTOCYTES BLD QL SMEAR: SLIGHT
EOSINOPHIL # BLD MANUAL: 0.1 10E3/UL (ref 0–0.7)
EOSINOPHIL NFR BLD MANUAL: 1 %
ERYTHROCYTE [DISTWIDTH] IN BLOOD BY AUTOMATED COUNT: 18.7 % (ref 10–15)
ERYTHROCYTE [DISTWIDTH] IN BLOOD BY AUTOMATED COUNT: 18.9 % (ref 10–15)
GFR SERPL CREATININE-BSD FRML MDRD: 23 ML/MIN/1.73M2
GLUCOSE BLDC GLUCOMTR-MCNC: 103 MG/DL (ref 70–99)
GLUCOSE BLDC GLUCOMTR-MCNC: 127 MG/DL (ref 70–99)
GLUCOSE BLDC GLUCOMTR-MCNC: 141 MG/DL (ref 70–99)
GLUCOSE BLDC GLUCOMTR-MCNC: 178 MG/DL (ref 70–99)
GLUCOSE BLDC GLUCOMTR-MCNC: 206 MG/DL (ref 70–99)
GLUCOSE BLDC GLUCOMTR-MCNC: 271 MG/DL (ref 70–99)
GLUCOSE BLDC GLUCOMTR-MCNC: 283 MG/DL (ref 70–99)
GLUCOSE BLDC GLUCOMTR-MCNC: 311 MG/DL (ref 70–99)
GLUCOSE SERPL-MCNC: 131 MG/DL (ref 70–99)
HCT VFR BLD AUTO: 23.5 % (ref 40–53)
HCT VFR BLD AUTO: 24.6 % (ref 40–53)
HGB BLD-MCNC: 7.5 G/DL (ref 13.3–17.7)
HGB BLD-MCNC: 7.6 G/DL (ref 13.3–17.7)
INR PPP: 1.09 (ref 0.85–1.15)
LACTATE SERPL-SCNC: 1.1 MMOL/L (ref 0.7–2)
LYMPHOCYTES # BLD MANUAL: 0 10E3/UL (ref 0.8–5.3)
LYMPHOCYTES NFR BLD MANUAL: 0 %
MAGNESIUM SERPL-MCNC: 1.5 MG/DL (ref 1.7–2.3)
MCH RBC QN AUTO: 30 PG (ref 26.5–33)
MCH RBC QN AUTO: 30.2 PG (ref 26.5–33)
MCHC RBC AUTO-ENTMCNC: 30.9 G/DL (ref 31.5–36.5)
MCHC RBC AUTO-ENTMCNC: 31.9 G/DL (ref 31.5–36.5)
MCV RBC AUTO: 95 FL (ref 78–100)
MCV RBC AUTO: 97 FL (ref 78–100)
MONOCYTES # BLD MANUAL: 0.2 10E3/UL (ref 0–1.3)
MONOCYTES NFR BLD MANUAL: 2 %
MYELOCYTES # BLD MANUAL: 0.3 10E3/UL
MYELOCYTES NFR BLD MANUAL: 3 %
NEUTROPHILS # BLD MANUAL: 10 10E3/UL (ref 1.6–8.3)
NEUTROPHILS NFR BLD MANUAL: 94 %
PHOSPHATE SERPL-MCNC: 3.8 MG/DL (ref 2.5–4.5)
PLAT MORPH BLD: ABNORMAL
PLATELET # BLD AUTO: 363 10E3/UL (ref 150–450)
PLATELET # BLD AUTO: 374 10E3/UL (ref 150–450)
POLYCHROMASIA BLD QL SMEAR: ABNORMAL
POTASSIUM SERPL-SCNC: 3.9 MMOL/L (ref 3.4–5.3)
PROT SERPL-MCNC: 6.1 G/DL (ref 6.4–8.3)
RBC # BLD AUTO: 2.48 10E6/UL (ref 4.4–5.9)
RBC # BLD AUTO: 2.53 10E6/UL (ref 4.4–5.9)
RBC MORPH BLD: ABNORMAL
SODIUM SERPL-SCNC: 134 MMOL/L (ref 136–145)
TACROLIMUS BLD-MCNC: 6.1 UG/L (ref 5–15)
TME LAST DOSE: NORMAL H
TME LAST DOSE: NORMAL H
UFH PPP CHRO-ACNC: 0.23 IU/ML
UFH PPP CHRO-ACNC: 0.27 IU/ML
UFH PPP CHRO-ACNC: 0.29 IU/ML
WBC # BLD AUTO: 10.6 10E3/UL (ref 4–11)
WBC # BLD AUTO: 11.6 10E3/UL (ref 4–11)

## 2023-06-25 PROCEDURE — 250N000012 HC RX MED GY IP 250 OP 636 PS 637: Performed by: PHYSICIAN ASSISTANT

## 2023-06-25 PROCEDURE — 250N000011 HC RX IP 250 OP 636: Mod: JZ | Performed by: INTERNAL MEDICINE

## 2023-06-25 PROCEDURE — 83605 ASSAY OF LACTIC ACID: CPT | Performed by: TRANSPLANT SURGERY

## 2023-06-25 PROCEDURE — 80197 ASSAY OF TACROLIMUS: CPT | Performed by: PHYSICIAN ASSISTANT

## 2023-06-25 PROCEDURE — 84100 ASSAY OF PHOSPHORUS: CPT | Performed by: SURGERY

## 2023-06-25 PROCEDURE — 80053 COMPREHEN METABOLIC PANEL: CPT | Performed by: SURGERY

## 2023-06-25 PROCEDURE — 250N000013 HC RX MED GY IP 250 OP 250 PS 637: Performed by: PHYSICIAN ASSISTANT

## 2023-06-25 PROCEDURE — 250N000012 HC RX MED GY IP 250 OP 636 PS 637: Performed by: INTERNAL MEDICINE

## 2023-06-25 PROCEDURE — 85027 COMPLETE CBC AUTOMATED: CPT | Performed by: INTERNAL MEDICINE

## 2023-06-25 PROCEDURE — 85520 HEPARIN ASSAY: CPT | Performed by: TRANSPLANT SURGERY

## 2023-06-25 PROCEDURE — 250N000013 HC RX MED GY IP 250 OP 250 PS 637: Performed by: TRANSPLANT SURGERY

## 2023-06-25 PROCEDURE — 36592 COLLECT BLOOD FROM PICC: CPT | Performed by: TRANSPLANT SURGERY

## 2023-06-25 PROCEDURE — 85520 HEPARIN ASSAY: CPT | Performed by: INTERNAL MEDICINE

## 2023-06-25 PROCEDURE — 97110 THERAPEUTIC EXERCISES: CPT | Mod: GP | Performed by: PHYSICAL THERAPIST

## 2023-06-25 PROCEDURE — 82248 BILIRUBIN DIRECT: CPT | Performed by: SURGERY

## 2023-06-25 PROCEDURE — 120N000009 HC R&B SNF

## 2023-06-25 PROCEDURE — 85007 BL SMEAR W/DIFF WBC COUNT: CPT | Performed by: SURGERY

## 2023-06-25 PROCEDURE — 250N000013 HC RX MED GY IP 250 OP 250 PS 637: Performed by: INTERNAL MEDICINE

## 2023-06-25 PROCEDURE — 83735 ASSAY OF MAGNESIUM: CPT | Performed by: SURGERY

## 2023-06-25 PROCEDURE — 36592 COLLECT BLOOD FROM PICC: CPT | Performed by: SURGERY

## 2023-06-25 PROCEDURE — 99233 SBSQ HOSP IP/OBS HIGH 50: CPT | Mod: 24 | Performed by: NURSE PRACTITIONER

## 2023-06-25 PROCEDURE — 36592 COLLECT BLOOD FROM PICC: CPT | Performed by: INTERNAL MEDICINE

## 2023-06-25 PROCEDURE — 250N000009 HC RX 250: Performed by: INTERNAL MEDICINE

## 2023-06-25 PROCEDURE — 85027 COMPLETE CBC AUTOMATED: CPT | Performed by: SURGERY

## 2023-06-25 PROCEDURE — 250N000012 HC RX MED GY IP 250 OP 636 PS 637: Performed by: TRANSPLANT SURGERY

## 2023-06-25 PROCEDURE — 250N000011 HC RX IP 250 OP 636: Mod: JZ | Performed by: NURSE PRACTITIONER

## 2023-06-25 PROCEDURE — 250N000013 HC RX MED GY IP 250 OP 250 PS 637: Performed by: NURSE PRACTITIONER

## 2023-06-25 PROCEDURE — 85610 PROTHROMBIN TIME: CPT | Performed by: NURSE PRACTITIONER

## 2023-06-25 PROCEDURE — 97530 THERAPEUTIC ACTIVITIES: CPT | Mod: GP | Performed by: PHYSICAL THERAPIST

## 2023-06-25 RX ORDER — DEXTROSE MONOHYDRATE 25 G/50ML
25-50 INJECTION, SOLUTION INTRAVENOUS
Status: DISCONTINUED | OUTPATIENT
Start: 2023-06-25 | End: 2023-07-27 | Stop reason: HOSPADM

## 2023-06-25 RX ORDER — MYCOPHENOLATE MOFETIL 250 MG/1
750 CAPSULE ORAL 2 TIMES DAILY
Status: ON HOLD | DISCHARGE
Start: 2023-06-25 | End: 2023-07-10

## 2023-06-25 RX ORDER — ALLOPURINOL 100 MG/1
100 TABLET ORAL DAILY
Status: DISCONTINUED | OUTPATIENT
Start: 2023-06-26 | End: 2023-07-27 | Stop reason: HOSPADM

## 2023-06-25 RX ORDER — OXYCODONE HYDROCHLORIDE 5 MG/1
2.5 TABLET ORAL EVERY 4 HOURS PRN
Refills: 0 | Status: ON HOLD | DISCHARGE
Start: 2023-06-25 | End: 2023-07-25

## 2023-06-25 RX ORDER — MYCOPHENOLATE MOFETIL 250 MG/1
750 CAPSULE ORAL 2 TIMES DAILY
Status: DISCONTINUED | OUTPATIENT
Start: 2023-06-25 | End: 2023-07-14

## 2023-06-25 RX ORDER — NICOTINE POLACRILEX 4 MG
15-30 LOZENGE BUCCAL
Status: DISCONTINUED | OUTPATIENT
Start: 2023-06-25 | End: 2023-07-27 | Stop reason: HOSPADM

## 2023-06-25 RX ORDER — SULFAMETHOXAZOLE AND TRIMETHOPRIM 400; 80 MG/1; MG/1
1 TABLET ORAL
Status: DISCONTINUED | OUTPATIENT
Start: 2023-06-26 | End: 2023-06-30

## 2023-06-25 RX ORDER — BUMETANIDE 2 MG/1
2 TABLET ORAL 2 TIMES DAILY
Status: ON HOLD | DISCHARGE
Start: 2023-06-25 | End: 2023-07-25

## 2023-06-25 RX ORDER — METOPROLOL TARTRATE 25 MG/1
12.5 TABLET, FILM COATED ORAL EVERY 8 HOURS
Status: ON HOLD | DISCHARGE
Start: 2023-06-25 | End: 2023-07-25

## 2023-06-25 RX ORDER — NALOXONE HYDROCHLORIDE 0.4 MG/ML
0.4 INJECTION, SOLUTION INTRAMUSCULAR; INTRAVENOUS; SUBCUTANEOUS
Status: DISCONTINUED | OUTPATIENT
Start: 2023-06-25 | End: 2023-07-27 | Stop reason: HOSPADM

## 2023-06-25 RX ORDER — ASPIRIN 325 MG
325 TABLET ORAL DAILY
Status: DISCONTINUED | OUTPATIENT
Start: 2023-06-26 | End: 2023-07-27 | Stop reason: HOSPADM

## 2023-06-25 RX ORDER — METHOCARBAMOL 500 MG/1
500 TABLET, FILM COATED ORAL 4 TIMES DAILY PRN
Status: ON HOLD | DISCHARGE
Start: 2023-06-25 | End: 2023-07-25

## 2023-06-25 RX ORDER — TACROLIMUS 1 MG/1
2 CAPSULE ORAL 2 TIMES DAILY
Status: DISCONTINUED | OUTPATIENT
Start: 2023-06-25 | End: 2023-07-05

## 2023-06-25 RX ORDER — BUMETANIDE 1 MG/1
2 TABLET ORAL 2 TIMES DAILY
Status: DISCONTINUED | OUTPATIENT
Start: 2023-06-25 | End: 2023-07-01

## 2023-06-25 RX ORDER — SIMETHICONE 80 MG
80 TABLET,CHEWABLE ORAL EVERY 6 HOURS PRN
Status: DISCONTINUED | OUTPATIENT
Start: 2023-06-25 | End: 2023-07-27 | Stop reason: HOSPADM

## 2023-06-25 RX ORDER — ATORVASTATIN CALCIUM 10 MG/1
20 TABLET, FILM COATED ORAL EVERY EVENING
Status: DISCONTINUED | OUTPATIENT
Start: 2023-06-25 | End: 2023-07-27 | Stop reason: HOSPADM

## 2023-06-25 RX ORDER — PANTOPRAZOLE SODIUM 40 MG/1
40 TABLET, DELAYED RELEASE ORAL
Status: ON HOLD | DISCHARGE
Start: 2023-06-26 | End: 2023-07-25

## 2023-06-25 RX ORDER — ALBUTEROL SULFATE 90 UG/1
2 AEROSOL, METERED RESPIRATORY (INHALATION) EVERY 6 HOURS PRN
Status: DISCONTINUED | OUTPATIENT
Start: 2023-06-25 | End: 2023-07-27 | Stop reason: HOSPADM

## 2023-06-25 RX ORDER — ALLOPURINOL 100 MG/1
100 TABLET ORAL DAILY
Status: ON HOLD | DISCHARGE
Start: 2023-06-26 | End: 2023-07-25

## 2023-06-25 RX ORDER — URSODIOL 250 MG/1
250 TABLET, FILM COATED ORAL 2 TIMES DAILY
Status: ON HOLD | DISCHARGE
Start: 2023-06-25 | End: 2023-07-25

## 2023-06-25 RX ORDER — WARFARIN SODIUM 3 MG/1
3 TABLET ORAL
Status: DISCONTINUED | OUTPATIENT
Start: 2023-06-25 | End: 2023-06-25 | Stop reason: HOSPADM

## 2023-06-25 RX ORDER — MAGNESIUM OXIDE 400 MG/1
400 TABLET ORAL DAILY
Status: DISCONTINUED | OUTPATIENT
Start: 2023-06-26 | End: 2023-06-26

## 2023-06-25 RX ORDER — METHOCARBAMOL 500 MG/1
500 TABLET, FILM COATED ORAL 4 TIMES DAILY PRN
Status: DISCONTINUED | OUTPATIENT
Start: 2023-06-25 | End: 2023-07-27 | Stop reason: HOSPADM

## 2023-06-25 RX ORDER — VALGANCICLOVIR 450 MG/1
450 TABLET, FILM COATED ORAL EVERY OTHER DAY
Status: ON HOLD | DISCHARGE
Start: 2023-06-26 | End: 2023-07-25

## 2023-06-25 RX ORDER — VIT B COMP NO.3/FOLIC/C/BIOTIN 1 MG-60 MG
1 TABLET ORAL DAILY
Status: ON HOLD | DISCHARGE
Start: 2023-06-26 | End: 2023-07-25

## 2023-06-25 RX ORDER — PANTOPRAZOLE SODIUM 40 MG/1
40 TABLET, DELAYED RELEASE ORAL
Status: DISCONTINUED | OUTPATIENT
Start: 2023-06-26 | End: 2023-07-27 | Stop reason: HOSPADM

## 2023-06-25 RX ORDER — NYSTATIN 100000/ML
500000 SUSPENSION, ORAL (FINAL DOSE FORM) ORAL 4 TIMES DAILY
Status: ON HOLD | DISCHARGE
Start: 2023-06-25 | End: 2023-07-25

## 2023-06-25 RX ORDER — ONDANSETRON 2 MG/ML
4 INJECTION INTRAMUSCULAR; INTRAVENOUS EVERY 6 HOURS PRN
Status: DISCONTINUED | OUTPATIENT
Start: 2023-06-25 | End: 2023-07-27 | Stop reason: HOSPADM

## 2023-06-25 RX ORDER — ONDANSETRON 4 MG/1
4 TABLET, ORALLY DISINTEGRATING ORAL EVERY 6 HOURS PRN
Status: DISCONTINUED | OUTPATIENT
Start: 2023-06-25 | End: 2023-07-27 | Stop reason: HOSPADM

## 2023-06-25 RX ORDER — NALOXONE HYDROCHLORIDE 0.4 MG/ML
0.2 INJECTION, SOLUTION INTRAMUSCULAR; INTRAVENOUS; SUBCUTANEOUS
Status: DISCONTINUED | OUTPATIENT
Start: 2023-06-25 | End: 2023-07-27 | Stop reason: HOSPADM

## 2023-06-25 RX ORDER — ONDANSETRON 4 MG/1
4 TABLET, ORALLY DISINTEGRATING ORAL EVERY 6 HOURS PRN
Status: DISCONTINUED | OUTPATIENT
Start: 2023-06-25 | End: 2023-06-25

## 2023-06-25 RX ORDER — VALGANCICLOVIR 450 MG/1
450 TABLET, FILM COATED ORAL EVERY OTHER DAY
Status: DISCONTINUED | OUTPATIENT
Start: 2023-06-25 | End: 2023-07-03

## 2023-06-25 RX ORDER — SULFAMETHOXAZOLE AND TRIMETHOPRIM 400; 80 MG/1; MG/1
1 TABLET ORAL
Status: ON HOLD | DISCHARGE
Start: 2023-06-26 | End: 2023-07-25

## 2023-06-25 RX ORDER — PREDNISONE 10 MG/1
TABLET ORAL
Status: ON HOLD | DISCHARGE
Start: 2023-06-25 | End: 2023-07-10

## 2023-06-25 RX ORDER — ATORVASTATIN CALCIUM 20 MG/1
20 TABLET, FILM COATED ORAL EVERY EVENING
Status: ON HOLD | DISCHARGE
Start: 2023-06-25 | End: 2023-07-25

## 2023-06-25 RX ORDER — VALGANCICLOVIR 450 MG/1
450 TABLET, FILM COATED ORAL EVERY OTHER DAY
Status: DISCONTINUED | OUTPATIENT
Start: 2023-06-26 | End: 2023-06-25 | Stop reason: HOSPADM

## 2023-06-25 RX ORDER — BISACODYL 10 MG
10 SUPPOSITORY, RECTAL RECTAL DAILY PRN
Status: ON HOLD | DISCHARGE
Start: 2023-06-25 | End: 2023-07-25

## 2023-06-25 RX ORDER — URSODIOL 250 MG/1
250 TABLET, FILM COATED ORAL 2 TIMES DAILY
Status: DISCONTINUED | OUTPATIENT
Start: 2023-06-25 | End: 2023-07-27 | Stop reason: HOSPADM

## 2023-06-25 RX ORDER — POLYETHYLENE GLYCOL 3350 17 G/17G
17 POWDER, FOR SOLUTION ORAL DAILY
Status: DISCONTINUED | OUTPATIENT
Start: 2023-06-26 | End: 2023-07-27 | Stop reason: HOSPADM

## 2023-06-25 RX ORDER — POLYETHYLENE GLYCOL 3350 17 G/17G
17 POWDER, FOR SOLUTION ORAL DAILY
Qty: 510 G | Status: ON HOLD | DISCHARGE
Start: 2023-06-25 | End: 2023-07-25

## 2023-06-25 RX ORDER — ALBUTEROL SULFATE 90 UG/1
2 AEROSOL, METERED RESPIRATORY (INHALATION) EVERY 6 HOURS PRN
Qty: 18 G | Status: ON HOLD | DISCHARGE
Start: 2023-06-25 | End: 2023-07-25

## 2023-06-25 RX ORDER — HEPARIN SODIUM 10000 [USP'U]/100ML
0-5000 INJECTION, SOLUTION INTRAVENOUS CONTINUOUS
Status: DISCONTINUED | OUTPATIENT
Start: 2023-06-25 | End: 2023-06-30

## 2023-06-25 RX ORDER — ACETAMINOPHEN 650 MG/1
650 SUPPOSITORY RECTAL EVERY 4 HOURS PRN
Status: DISCONTINUED | OUTPATIENT
Start: 2023-06-25 | End: 2023-06-28

## 2023-06-25 RX ORDER — WARFARIN SODIUM 3 MG/1
3 TABLET ORAL ONCE
Status: ON HOLD | DISCHARGE
Start: 2023-06-25 | End: 2023-07-25

## 2023-06-25 RX ORDER — VIT B COMP NO.3/FOLIC/C/BIOTIN 1 MG-60 MG
1 TABLET ORAL DAILY
Status: DISCONTINUED | OUTPATIENT
Start: 2023-06-26 | End: 2023-07-24

## 2023-06-25 RX ORDER — ONDANSETRON 4 MG/1
4 TABLET, ORALLY DISINTEGRATING ORAL EVERY 6 HOURS PRN
Status: ON HOLD | DISCHARGE
Start: 2023-06-25 | End: 2023-07-25

## 2023-06-25 RX ORDER — SIMETHICONE 80 MG
80 TABLET,CHEWABLE ORAL EVERY 6 HOURS PRN
Status: ON HOLD | DISCHARGE
Start: 2023-06-25 | End: 2023-07-25

## 2023-06-25 RX ORDER — TACROLIMUS 1 MG/1
2 CAPSULE ORAL 2 TIMES DAILY
Status: ON HOLD | DISCHARGE
Start: 2023-06-25 | End: 2023-07-25

## 2023-06-25 RX ORDER — PREDNISONE 10 MG/1
30 TABLET ORAL DAILY
Status: ON HOLD | DISCHARGE
Start: 2023-06-26 | End: 2023-07-10

## 2023-06-25 RX ORDER — MAGNESIUM OXIDE 400 MG/1
400 TABLET ORAL DAILY
Status: ON HOLD | DISCHARGE
Start: 2023-06-26 | End: 2023-07-25

## 2023-06-25 RX ORDER — NYSTATIN 100000/ML
500000 SUSPENSION, ORAL (FINAL DOSE FORM) ORAL 4 TIMES DAILY
Status: DISCONTINUED | OUTPATIENT
Start: 2023-06-25 | End: 2023-07-27 | Stop reason: HOSPADM

## 2023-06-25 RX ORDER — ACETAMINOPHEN 325 MG/1
650 TABLET ORAL EVERY 4 HOURS PRN
Status: DISCONTINUED | OUTPATIENT
Start: 2023-06-25 | End: 2023-07-27 | Stop reason: HOSPADM

## 2023-06-25 RX ORDER — ASPIRIN 325 MG
325 TABLET ORAL DAILY
Status: ON HOLD | DISCHARGE
Start: 2023-06-26 | End: 2023-07-25

## 2023-06-25 RX ORDER — PREDNISONE 10 MG/1
10 TABLET ORAL DAILY
Status: ON HOLD | DISCHARGE
Start: 2023-06-30 | End: 2023-07-10

## 2023-06-25 RX ORDER — BISACODYL 10 MG
10 SUPPOSITORY, RECTAL RECTAL DAILY PRN
Status: DISCONTINUED | OUTPATIENT
Start: 2023-06-25 | End: 2023-07-27 | Stop reason: HOSPADM

## 2023-06-25 RX ADMIN — PANTOPRAZOLE SODIUM 40 MG: 40 TABLET, DELAYED RELEASE ORAL at 08:07

## 2023-06-25 RX ADMIN — URSODIOL 250 MG: 250 TABLET ORAL at 20:23

## 2023-06-25 RX ADMIN — ALLOPURINOL 100 MG: 100 TABLET ORAL at 08:08

## 2023-06-25 RX ADMIN — NYSTATIN 500000 UNITS: 500000 SUSPENSION ORAL at 20:22

## 2023-06-25 RX ADMIN — NYSTATIN 500000 UNITS: 100000 SUSPENSION ORAL at 08:08

## 2023-06-25 RX ADMIN — TACROLIMUS 2 MG: 1 CAPSULE ORAL at 08:08

## 2023-06-25 RX ADMIN — NYSTATIN 500000 UNITS: 500000 SUSPENSION ORAL at 17:08

## 2023-06-25 RX ADMIN — ATORVASTATIN CALCIUM 20 MG: 10 TABLET, FILM COATED ORAL at 20:23

## 2023-06-25 RX ADMIN — WARFARIN SODIUM 3 MG: 2.5 TABLET ORAL at 17:09

## 2023-06-25 RX ADMIN — Medication 12.5 MG: at 10:09

## 2023-06-25 RX ADMIN — INSULIN ASPART 6 UNITS: 100 INJECTION, SOLUTION INTRAVENOUS; SUBCUTANEOUS at 17:07

## 2023-06-25 RX ADMIN — INSULIN ASPART 4 UNITS: 100 INJECTION, SOLUTION INTRAVENOUS; SUBCUTANEOUS at 21:57

## 2023-06-25 RX ADMIN — NYSTATIN 500000 UNITS: 100000 SUSPENSION ORAL at 12:04

## 2023-06-25 RX ADMIN — ASPIRIN 325 MG: 325 TABLET ORAL at 08:08

## 2023-06-25 RX ADMIN — PREDNISONE 30 MG: 20 TABLET ORAL at 08:08

## 2023-06-25 RX ADMIN — BUMETANIDE 2 MG: 2 TABLET ORAL at 13:57

## 2023-06-25 RX ADMIN — MYCOPHENOLATE MOFETIL 750 MG: 250 CAPSULE ORAL at 17:09

## 2023-06-25 RX ADMIN — BUMETANIDE 2 MG: 1 TABLET ORAL at 20:23

## 2023-06-25 RX ADMIN — URSODIOL 250 MG: 250 TABLET, FILM COATED ORAL at 08:08

## 2023-06-25 RX ADMIN — INSULIN GLARGINE 10 UNITS: 100 INJECTION, SOLUTION SUBCUTANEOUS at 21:57

## 2023-06-25 RX ADMIN — BUMETANIDE 2 MG: 2 TABLET ORAL at 08:08

## 2023-06-25 RX ADMIN — MAGNESIUM OXIDE TAB 400 MG (241.3 MG ELEMENTAL MG) 400 MG: 400 (241.3 MG) TAB at 08:08

## 2023-06-25 RX ADMIN — METOPROLOL TARTRATE 5 MG: 5 INJECTION INTRAVENOUS at 06:54

## 2023-06-25 RX ADMIN — HEPARIN SODIUM 1700 UNITS/HR: 10000 INJECTION, SOLUTION INTRAVENOUS at 13:57

## 2023-06-25 RX ADMIN — TACROLIMUS 2 MG: 1 CAPSULE ORAL at 17:09

## 2023-06-25 RX ADMIN — B-COMPLEX W/ C & FOLIC ACID TAB 1 MG 1 TABLET: 1 TAB at 08:08

## 2023-06-25 RX ADMIN — Medication 12.5 MG: at 13:57

## 2023-06-25 RX ADMIN — Medication 12.5 MG: at 21:58

## 2023-06-25 RX ADMIN — MYCOPHENOLATE MOFETIL 750 MG: 250 CAPSULE ORAL at 08:08

## 2023-06-25 RX ADMIN — OXYCODONE HYDROCHLORIDE 2.5 MG: 5 TABLET ORAL at 05:07

## 2023-06-25 RX ADMIN — VALGANCICLOVIR 450 MG: 450 TABLET, FILM COATED ORAL at 20:23

## 2023-06-25 RX ADMIN — INSULIN ASPART 2 UNITS: 100 INJECTION, SOLUTION INTRAVENOUS; SUBCUTANEOUS at 17:13

## 2023-06-25 RX ADMIN — HEPARIN SODIUM 1700 UNITS/HR: 10000 INJECTION, SOLUTION INTRAVENOUS at 17:01

## 2023-06-25 ASSESSMENT — ACTIVITIES OF DAILY LIVING (ADL)
ADLS_ACUITY_SCORE: 29
ADLS_ACUITY_SCORE: 28
ADLS_ACUITY_SCORE: 29
ADLS_ACUITY_SCORE: 28
ADLS_ACUITY_SCORE: 28
ADLS_ACUITY_SCORE: 29
ADLS_ACUITY_SCORE: 28
ADLS_ACUITY_SCORE: 29

## 2023-06-25 NOTE — PROGRESS NOTES
Care Management Discharge Note    Discharge Date:  6/25/23    Discharge Disposition: Transitional Care    Discharge Services: Other (see comment) (physical and occupational therapy)    Discharge DME: None    Discharge Transportation: [facility] ALS (heparin) transport, ride scheduled [phone] ([phone], Khadijah), PCS form completed    Private pay costs discussed: Not applicable    Does the patient's insurance plan have a 3 day qualifying hospital stay waiver?  No    PAS Confirmation Code: [id number]  Patient/family educated on Medicare website which has current facility and service quality ratings:  yes    Education Provided on the Discharge Plan: Yes  Persons Notified of Discharge Plans: patient, wife Apoorva, Shannon Singh, bedside and charge RN, [facility] rehab admissions (Trish Ramey), outpatient transplant coordinator Cnidy Clay    Patient/Family in Agreement with the Plan:  Yes  Wife asked questions about the care Casey will receive while at transitional care, and her questions were answered.  Physical therapist Charbel was also present and provided education about the therapy Casey will receive. Apoorva denies any concerns with Casey's medical stability to transfer.    Education and resources provided by  at discharge: role of transplant  in out patient setting and provided contact info for , availability of support group    Discussed anticipated pharmacy out of pocket costs: YES    Provided [employer] Donor Letter Writing packet : YES    Plan: I will remain available for the psychosocial needs of this patient.  [facility] TCU  will be primary while he is at rehab.        MOUNIKA Sawant, [facility] Hospital[facility]  [facility] Transplant   Phone [phone]  Pager [phone]

## 2023-06-25 NOTE — PHARMACY-ADMISSION MEDICATION HISTORY
Please see Admission Medication History note completed by pharmacist on 6/5/2023 under previous encounter at [facility] unit 7A for information regarding prior to admission medications.    Alexander Mata, PharmD  PGY1 Pharmacist Resident

## 2023-06-25 NOTE — H&P
[facility]    History and Physical - Hospitalist Service       Date of Admission:  (Not on file)    Assessment & Plan      Damion Quinones is a 67 year old male admitted on 2023 for ongoing rehabilitation after hospitalization at [place].  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  He underwent  simultaneous liver kidney transplant on 2023. RTOR on  with concern for low flow in the renal graft - ex-lap, washout, closure with healthy appearing graft with intact arterial and venous flow.  He is hospital course was also complicated by renal failure requiring CRRT and intermittent hemodialysis, RIJ clot, atrial fibrillation, hypertension, fluid overload, hypoxemia, anemia, leukocytosis, malnutrition, steroids/tube feed induced hyperglycemia, gout, deconditioning.  For details of hospitalization please refer to the discharge summary note on 2023.    # Physical deconditioning:   Patient will be seen and evaluated by physical and occupational therapy services here at the TCU.      Plan:  - Consult PT OT    # S/p DBD simultaneous liver kidney transplant on 2023 with complex reconstruction of accessory right hepatic artery:  # H/o CKD related IgA nephropathy and ANCA vasculitis s/p  donor renal transplant on 2023 with ureteral/J stent placement, delayed graft function:   * RTOR on  with concern for low flow in the renal graft - ex-lap, washout, closure with healthy appearing graft with intact arterial and venous flow.   * 23 Liver ultrasound with doppler: low RI right and left HA again seen, no significant change. 2 new fluid collections, possible hematoma.   *Stent: No biliary stent in place. Ursodiol 250 mg BID.  * Vascular ppx:  mg daily  * Liver  studies elevated, stable. Monitor MWF  * Last renal US with doppler 6/14 with faster than expected iliac vein velocity above the anastomosis may represent edema and elevated superior arcuate artery resistive index. Renal biopsy 6/20/2023: ATN, no evidence of rejection  Stop renal replacement therapy: CRRT 6/14-6/18. Intermittent HD on 6/19.   HD line in-place.   * No plan for renal replacement therapy today  *  Diuretics: Bumex 2 mg PO BID  * Immunosuppression:  Induction: Steroid taper and Thymoglobulin 400 mg (4 mg/kg) complete.   Maintenance:   -  mg BID  - Tacrolimus 2 mg BID. Goal 5-8 given slow renal recovery.  - Resumed PTA prednisone for rheumatoid arthritis (see below). Goal to wean down to 5 mg prednisone for chronic use upon discharge (will start wean outpatient).  * Infection prophylaxis: PCP (bactrim), viral (Valcyte x 12 weeks)     Transplant coordinator: Cindy Clay (Liver), Angelita Torres (Kidney) [phone]  Donor type: DBD  DSA at time of transplant:  Yes CW9 6/5/23  Ureteral stent: Yes  Biliary stent: Yes  CMV:  Donor - / Recipient +  EBV:  Donor + / Recipient +  Induction: Thymo 3.9 mg/kg (400 mg) and steroid taper.    * Acute post operative pain controlled with low dose oxycodone PRN.  Plan:  - Continue tacrolimus 2 mg twice daily.  Goal 5-8  - Continue  mg twice daily  - Continue prednisone taper  - Continue Bactrim, Valcyte  - Check CBC, CMP, mag, Phos, tacrolimus level on 6/27/2023  - CBC, CMP, mag, Phos, tacro level every Monday Wednesday Friday       # Acute on chronic anemia:     * Hgb stable 7-8 without any s/sx of bleeding.  * Last transfused 6/17.  * Hemoglobin level 7.5 g/dL on 6/25/2023.    Plan:  - Check CBC in a.m.  - CBC every Monday Wednesday Friday.  - Transfuse if hemoglobin less than 7 g/dL.      # RIJ clot:   * US 6/15 with partially occlusive inferior R internal jugular thrombus (previously partially occlusive thrombus in right axillary resolved).   * Patient is  currently on heparin infusion at low intensity and warfarin.  * INR goal is 1.5-2.   Plan:  - Continue heparin low intensity gtt until INR > 1.5.  - Warfarin dosing per pharmacist     ## Cardiology:  # Coronary artery disease, paroxysmal atrial fibrillation  * Patient was seen by cardiology during inpatient hospital stay.  * Patient is currently on aspirin, atorvastatin, metoprolol and warfarin.  Due to risks of bleeding and INR goal is lowered at 1.5-2    Plan:  - Continue aspirin, metoprolol, atorvastatin  - Continue warfarin.  Pharmacy to dose.    # Severe malnutrition in the context of acute illness:  * Nutrition consulted. NJ removed 6/22 with adequate PO intake.     Plan  -ID consult  -Continue Regular diet with supplements.     # Steroid/TF induced hyperglycemia:  * HgA1c 6.1% (6/6/2023).  * On insulin glargine 10 units at bedtime, insulin aspart 1 unit per 15 g of carbohydrate, and insulin aspart high intensity correction.    Plan:  - Continue insulin regime as above.  - Consider reducing insulin with steroid taper.  Monitor closely  - Consider endocrine consult and diabetic education if patient blood sugar not well controlled        ## Rheumatology:  # Psoriatic arthritis: Prior to admission was on prednisone 10 mg daily.  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    Plan:  - Continue prednisone as recommended by rheumatology (short taper then prednisone 10 mg daily)   -Continue allopurinol.      # Hepatitis B s Ag +: Noted to be positive pre-transplant on 6/5/23, but not previously positive (1/26/2023). HBV DNA negative on 6/5/23. No clear at risk behavior. Repeat Hep B s Ag was negative and HBV DNA not detected; suspect 6/5/2023 HBsAg positivity was a false positive.      # Hypotension: 6/10 Echo: EF 55-60%. Intermittently required Vasopressors - now off (s/p 6/6-6/12, 6/15-6/17). Midodrine 20 mg Q8H discontinued 6/22.     # Hypoxia: Likely related to fluid overload and  deconditioning. Resolved with diuresis and renal replacement therapy.     # Encephalopathy: Altered mental status was first noted 6/11/2023. Neurology consulted. Head CT without evidence of acute intra-cranial pathology. MRI/MRA - small area of diffusion restriction on DWI without correlation on ADC. EEG consistent with severe encephalopathy, but no seizures. Etiology of encephalopathy likely multifactorial: worsening uremia and recent bacteremia (although clearance of bacteremia has not resulted to improvement in mental status). No sedating medications; no recent opioids. Encephalopathy has improved and patient's mental status is back to baseline in the setting of improving uremia as of 6/16/2023 in the setting of uremia improvement.  *On 6/26/2023, patient is alert awake and oriented.  - Continue to monitor.    # Enterococcus Faecium bacteremia: Treatment completed 6/19.   *6/9 blood cultures positive for enterococcus faecium. No vegetations noted on TTE. Urine culture 6/10/23 without any growth. ID consulted.   * Blood cultures from 6/10, 6/11, 6/12, 6/13, and 6/16 with no growth to date  * Antibiotics: Vancomycin 6/12-6/20, linezolid 6/10-6/12, Zosyn 6/10-6/12.     # Leukocytosis: Afebrile. CT chest 6/16 with bibasilar atelectasis + small bilateral pleural effusion. Patchy densities in the left upper lobe anterolaterally. Infectious workup negative to date. Breathing comfortably on ambient air.      # Alcohol use disorder: Continue sober supports post transplant. Continue complete sobriety.  Plan:  -Social work consult         Diet: Regular Diet Adult  DVT Prophylaxis: On heparin infusion and warfarin  Lux Catheter: Not present  Lines: PRESENT             Cardiac Monitoring: None  Code Status: Full Code   Pneumococcal vaccination: Patient recently had solid organ transplant.  Will defer vaccination to primary transplant team    Clinically Significant Risk Factors Present on Admission            #  "Hypomagnesemia: Lowest Mg = 1.5 mg/dL in last 2 days, will replace as needed   # Hypoalbuminemia: Lowest albumin = 3 g/dL at 6/25/2023  5:16 AM, will monitor as appropriate  # Drug Induced Coagulation Defect: home medication list includes an anticoagulant medication  # Drug Induced Platelet Defect: home medication list includes an antiplatelet medication   # Hypertension: Noted on problem list      # Obesity: Estimated body mass index is 34.01 kg/m  as calculated from the following:    Height as of 6/5/23: 1.702 m (5' 7\").    Weight as of an earlier encounter on 6/25/23: 98.5 kg (217 lb 2.5 oz).            Disposition Plan     Expected Discharge Date: 06/27/2023                  Toni Sheth MD  Hospitalist Service  [facility]  Securely message with [employer] (more info)  Text page via [facility] Paging/Directory     ______________________________________________________________________    Chief Complaint   Deconditioning    History is obtained from the patient    History of Present Illness      67 year old male admitted on 6/25/2023 for ongoing rehabilitation after hospitalization at [place].  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  He underwent  simultaneous liver kidney transplant on 6/6/2023. RTOR on 6/6/203 with concern for low flow in the renal graft - ex-lap, washout, closure with healthy appearing graft with intact arterial and venous flow.  He is hospital course was also complicated by renal failure requiring CRRT and intermittent hemodialysis, RIJ clot, atrial fibrillation, hypertension, fluid overload, hypoxemia, anemia, leukocytosis, malnutrition, steroids/tube feed induced hyperglycemia, gout, deconditioning.  For details of hospitalization please refer to the discharge summary note on " 6/25/2023.    Currently, patient reports she is doing well.  Patient denies any nausea vomiting chest pain or shortness of breath.  Patient denies any abdominal pain.  Patient denies any burning urination or loose stools.  Patient reports he is eating well.  Patient reports he is looking forward for therapy.  He states this is what I exactly need and need to get stronger.      Past Medical History    Past Medical History:   Diagnosis Date     Alcoholic cirrhosis of liver with ascites (H) 10/11/2019     C. difficile colitis      Coronary artery disease     [facility] 4/2023 - complete occlusion of RCA     ESRD (end stage renal disease) on dialysis (H)      History of hemochromatosis 10/11/2019     Hypertension      Obesity      PAF (paroxysmal atrial fibrillation) (H)      Psoriatic arthritis (H)        Past Surgical History   Past Surgical History:   Procedure Laterality Date     APPENDECTOMY      Removed at 18 Years Old      BENCH KIDNEY  06/06/2023    Procedure: Bench kidney;  Surgeon: Chad Clifton MD;  Location:  OR     [facility] LIVER  06/06/2023    Procedure: Bench liver;  Surgeon: Chad Clifton MD;  Location:  OR     COLONOSCOPY      2014 at [facility]      CV CORONARY ANGIOGRAM N/A 04/27/2023    Procedure: Coronary Angiogram;  Surgeon: Pablo Araujo MD;  Location:  HEART CARDIAC CATH LAB     EYE SURGERY Bilateral     Cataract     H STATISTIC PICC LINE INSERTION >5YR, FAILED Left 06/16/2023    Unable to advance catheter over the axillary area     HERNIA REPAIR      History of bilateral inguinal hernia repair: 10/28/2014. Open hernia repair: 10/2017. Abdominal wound exploration and debridement 12/27/2017     INSERT SHUNT PORTAL TRANSJUGULAR INTRAHEPTIC  09/26/2022     IR CVC TUNNEL PLACEMENT > 5 YRS OF AGE  01/26/2023     IR PICC PLACEMENT > 5 YRS OF AGE  6/16/2023     IR RENAL BIOPSY RIGHT  6/20/2023     RETURN LIVER TRANSPLANT N/A 06/06/2023    Procedure: Return liver  transplant. Intra-operative ultrasound;  Surgeon: Chad Clifton MD;  Location: [facility] OR     TRANSPLANT KIDNEY RECIPIENT  DONOR N/A 2023    Procedure: Transplant kidney recipient  donor, ureteral stent placement;  Surgeon: Chad Clifton MD;  Location: [facility] OR     TRANSPLANT LIVER RECIPIENT  DONOR N/A 2023    Procedure: Transplant liver recipient  donor;  Surgeon: Chad Clifton MD;  Location: [facility] OR       Prior to Admission Medications   Prior to Admission Medications   Prescriptions Last Dose Informant Patient Reported? Taking?   albuterol (PROAIR HFA/PROVENTIL HFA/VENTOLIN HFA) 108 (90 Base) MCG/ACT inhaler   No No   Sig: Inhale 2 puffs into the lungs every 6 hours as needed for wheezing   allopurinol (ZYLOPRIM) 100 MG tablet   No No   Sig: Take 1 tablet (100 mg) by mouth daily   aspirin (ASA) 325 MG tablet   No No   Si tablet (325 mg) by Oral or Feeding Tube route daily   atorvastatin (LIPITOR) 20 MG tablet   No No   Si tablet (20 mg) by Oral or Feeding Tube route every evening   bisacodyl (DULCOLAX) 10 MG suppository   No No   Sig: Place 1 suppository (10 mg) rectally daily as needed for constipation   bumetanide (BUMEX) 2 MG tablet   No No   Sig: Take 1 tablet (2 mg) by mouth 2 times daily   diclofenac (VOLTAREN) 1 % topical gel   No No   Sig: Apply 4 g topically 3 times daily   insulin aspart (NOVOLOG PEN) 100 UNIT/ML pen   No No   Sig: Inject 1-12 Units Subcutaneous every 4 hours   insulin aspart (NOVOLOG PEN) 100 UNIT/ML pen   No No   Sig: Inject 1-7 Units Subcutaneous 3 times daily (with meals) DOSE:  1 units per 15 grams of carbohydrate.  Only chart total amount of units given.  Do not give if pre-prandial glucose is less than 60 mg/dL.  If given at mealtime, administer within 30 minutes of start of meal.   insulin aspart (NOVOLOG PEN) 100 UNIT/ML pen   No No   Sig: Inject 1-7 Units Subcutaneous Take with snacks or supplements for high  blood sugar   insulin glargine (LANTUS PEN) 100 UNIT/ML pen   No No   Sig: Inject 10 Units Subcutaneous At Bedtime   magnesium oxide (MAG-OX) 400 MG tablet   No No   Sig: Take 1 tablet (400 mg) by mouth daily   methocarbamol (ROBAXIN) 500 MG tablet   No No   Si tablet (500 mg) by Oral or Feeding Tube route 4 times daily as needed for muscle spasms   metoprolol tartrate (LOPRESSOR) 25 MG tablet   No No   Si.5 tablets (12.5 mg) by Oral or Feeding Tube route every 8 hours   multivitamin RENAL (RENAVITE RX/NEPHROVITE) 1 tablet tablet   No No   Si tablet by Oral or Feeding Tube route daily   mycophenolate (GENERIC EQUIVALENT) 250 MG capsule   No No   Sig: Take 3 capsules (750 mg) by mouth 2 times daily   nystatin (MYCOSTATIN) 780704 UNIT/ML suspension   No No   Sig: Take 5 mLs (500,000 Units) by mouth 4 times daily   ondansetron (ZOFRAN ODT) 4 MG ODT tab   No No   Sig: Take 1 tablet (4 mg) by mouth every 6 hours as needed for nausea or vomiting   oxyCODONE (ROXICODONE) 5 MG tablet   No No   Si.5 tablets (2.5 mg) by Oral or Feeding Tube route every 4 hours as needed for moderate pain   pantoprazole (PROTONIX) 40 MG EC tablet   No No   Sig: Take 1 tablet (40 mg) by mouth every morning (before breakfast)   polyethylene glycol (MIRALAX) 17 GM/Dose powder   No No   Sig: Take 17 g by mouth daily   predniSONE (DELTASONE) 10 MG tablet   No No   Sig: Take 3 tablets (30 mg) by mouth daily   predniSONE (DELTASONE) 10 MG tablet   No No   Sig: Take 3 tabs by mouth on , then 2 tabs daily x 3 days (-).   predniSONE (DELTASONE) 10 MG tablet   No No   Si tablet (10 mg) by Per Feeding Tube route daily   simethicone (MYLICON) 80 MG chewable tablet   No No   Si tablet (80 mg) by Oral or Feeding Tube route every 6 hours as needed for cramping   sulfamethoxazole-trimethoprim (BACTRIM) 400-80 MG tablet   No No   Si tablet by Oral or Feeding Tube route three times a week   tacrolimus (GENERIC EQUIVALENT)  1 MG capsule   No No   Sig: Take 2 capsules (2 mg) by mouth 2 times daily   ursodiol (ACTIGALL) 250 MG tablet   No No   Sig: Take 1 tablet (250 mg) by mouth 2 times daily   valGANciclovir (VALCYTE) 450 MG tablet   No No   Sig: Take 1 tablet (450 mg) by mouth every other day   warfarin ANTICOAGULANT (COUMADIN) 3 MG tablet   No No   Sig: Take 1 tablet (3 mg) by mouth once for 1 dose      Facility-Administered Medications: None        Review of Systems    The 10 point Review of Systems is negative other than noted in the HPI or here.     Social History   I have reviewed this patient's social history and updated it with pertinent information if needed.  Social History     Tobacco Use     Smoking status: Never     Passive exposure: Never     Smokeless tobacco: Never   Vaping Use     Vaping Use: Never used   Substance Use Topics     Alcohol use: Not Currently     Comment: Last ETOH use was 12/31/2021     Drug use: Not Currently       Family History   I have reviewed this patient's family history and updated it with pertinent information if needed.  Family History   Problem Relation Age of Onset     Lung Cancer Mother      Colon Cancer Father      Lung Cancer Brother      Heart Failure Brother      Kidney Disease Brother        Allergies   No Known Allergies     Physical Exam   Vital Signs: Temp: 98  F (36.7  C) Temp src: Oral BP: 123/77 Pulse: 96   Resp: 18 SpO2: 100 % O2 Device: None (Room air)    Weight: 0 lbs 0 oz    General Appearance: Laying in bed in no acute distress  Respiratory: Bilaterally clear to auscultation  Cardiovascular: S1, S2 normal  GI: Soft, nontender, surgical incision and staples in place  Skin: No obvious rashes  Neuro: Alert awake and oriented.  No focal abnormalities      Medical Decision Making       **CLEAR ALL SELECTIONS**      Data   ------------------------- PAST 24 HR DATA REVIEWED -----------------------------------------------    I have personally reviewed the following data over the past  24 hrs:    11.6 (H)  \   7.5 (L)   / 374     N/A N/A N/A /  136 (H)   N/A N/A N/A \       INR:  1.10 PTT:  N/A   D-dimer:  N/A Fibrinogen:  N/A       Imaging results reviewed over the past 24 hrs:   No results found for this or any previous visit (from the past 24 hour(s)).

## 2023-06-25 NOTE — PLAN OF CARE
DISCHARGE:  Patient with orders to discharge to [place] [facility].     Education Provided:   Med Card - updated and given to patient's spouse  Lab Book - Updated and given to patient's spouse  Handouts - n/a  Specialty Pharmacy - Will be done at TCU  LDAs - L Double lumen PICC, R upper PIV infusing Heparin at 1700u/hr      Patient in stable condition. AVSS. Patient and spouse had no further questions regarding discharge instructions and medications. Patient transferred out by stretcher & left with [facility].

## 2023-06-25 NOTE — PLAN OF CARE
Goal Outcome Evaluation:        Patient is a 67 year old male  admitted to room 414  via EMS .  Patient is alert and oriented X 4. See Epic for VS and assessment.  Patient is able to transfer assist x2 with liko lift using. Patient was settled into their room, shown call light, tv, mealtimes etc. Oriented to unit. Will continue monitoring pain level and VS. Notifying MD with any concerns.  Follow MD orders for cares and medications.    Flu Vaccine - do they want the flu vaccine, if applicable? N/A (If flu vaccine is due, pls ask patient if they would be interested in the flu vaccine and follow the prompts to the left of the chart) already had vaccination     COVID Vaccine: 5 of 5 (Typically, patients should have four COVID vaccines plus the Bivalent booster. Under immunizations, please verify where the patient falls and offer the COVID booster, if applicable. If patient is interested, please add on sticky for provider to order. If declines, please add in comments.) Comments:         Level of Schooling: College   Ethnicity:  Marital Status:  Dentures: Yes (partial, needs to be removed at night)   Hearing Aid: no   Smoker:  No  Glasses: Yes, readers   Occupation: retired (worked in IT)   Falls 0-1 mo: No  2-6 mo: no   Stairs prior function: Independent  Prior device use: none   [facility] Care Directive Referral to Social Work?Yes

## 2023-06-25 NOTE — PLAN OF CARE
"/74 (BP Location: Left arm)   Pulse 93   Temp 98.5  F (36.9  C) (Oral)   Resp 18   Ht 1.702 m (5' 7\")   Wt 98.6 kg (217 lb 6 oz)   SpO2 97%   BMI 34.05 kg/m      Shift: [id number]  VS: VSS on RA, afebrile  Neuro: AOx4  BG: Q4h, 178, 127  Labs: am labs  Respiratory: HOWARD  Pain/Nausea/PRN: denies   Diet: regular with carb counts  LDA: PICC hep locked; PIV infusing heparin @ 1700 units/hr  GI/: voids at bedside urinal,   Skin: generalized bruising, skin tear on R forearm with gout nodule, dressing changes on R foot (daily)  Marcia green RLQ incision stapled CDI  Mobility: lift (asx2 with PT)  Plan: continue POC, awaiting TCU    Handoff given to following RN.                          "

## 2023-06-25 NOTE — PROGRESS NOTES
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 Increasing UOP.Last iHD on 6/19. Good urine output with diuretic. Monitor urine output.    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  Slow trend up in LFTs.  Followed by Transplant Surgery.     # Immunosuppression: Tacrolimus immediate release (goal 5-8), Mycophenolate mofetil (dose 750 mg every 12 hours) and Prednisone (dose 30 mg daily) due to gout    - Induction with Recent Transplant:   rATG total 4mg/kg, stopped due to sepsis   - Changes: Not at this time. Plan to wean pred down to 5mg daily once tac goal is increased (after renal recovery), higher prednisone dose in setting of gout.     # Infection Prophylaxis:   - PJP: Sulfa/TMP (Bactrim)  - CMV: Valganciclovir (Valcyte);CMV IgG Ab positive  - Thrush: Micafungin (Mycamine)  - Fungal: Micafungin (Mycamine)    #  Hypotension, resolved: Controlled, BP increasing Goal BP: < 130/80   - Volume status: Moderate hypervolemia  EDW ~ 96.4 kg   - Changes: No, continue bumex 2mg PO BID     -Continue metoprolol 12.5mg q8h for afib     # Elevated Blood Glucose: Glucose generally running ~ 140-180s'   - On insulin prn.   - Management as per primary team.    # Anemia in Chronic Renal Disease/Surgery: Hgb: Stable, low      FEDERICO: No   - Iron studies: Replete    - Transfusion for Hgb <7 mg/dl, management per primary team    # Mineral Bone Disorder:   - Secondary renal hyperparathyroidism; PTH level: Minimally elevated ( pg/ml)        On treatment: None  - Vitamin D; level: Normal        On supplement: No  - Calcium; level: Normal       On supplement: No  - Phosphorus; level: Normal     On binder: No    # Electrolytes:   - Potassium; level: Normal        On supplement: No, replete  - Magnesium; level: Low        On supplement: Yes, should start on magnesium 800mg po   - Bicarbonate; level: Normal        On supplement: Yes  - Sodium; level: Low    # Paroxysmal A-Fib: Stable heart rate on beta blocker and anticoagulation with IV heparin.   -Transitioning to warfarin     # History of C.diff: Continues with loose stools, but negative C. diff with last check 6/11.    # E. Faecium Bacteremia: Diagnosed on 6/9 BlCx. Treated with vancomycin thru 6/20    # ANCA Vasculitis: S/p Rituximab x2   - Will be on acute GN protocol post transplant.    # Gout: On prednisone 10 mg PO daily PTA. Currently on prednisone 30 with gout flare with plan to eventually wean to prednisone 5 once kidney is working and tac increased >8   -Continue allopurinol 100mg daily    - ok to give colchicine 0.3mg once and will check uric acid level    - should follow up with rheumatology. Consider consulting rheumatology inpatient    # Transplant History:  Etiology of Kidney Failure: IgA nephropathy and ANCA vasculitis  Tx: Liver Tx (SLK)  Transplant: 6/6/2023 (Liver), 6/6/2023  (Kidney)  Significant changes in immunosuppression: None  Significant transplant-related complications: None    Recommendations were communicated to the primary team verbally         Physician Attestation     I saw and evaluated Damion Quinones as part of a shared APRN/PA visit.     I personally reviewed the vital signs, medications, labs and imaging.    I personally performed the substantive portion of the medical decision making for this visit - please see the GEORGE's documentation for full details.    Key management decisions made by me and carried out under my direction:  pain in R big toe 2/2 gouty flare improving with steroids, responding to diuretics, good UOP, brisk diuresis and swelling improved. Cr down-trending, decrease bumex to 2mg po bid.will continue to monitor for renal recovery. Check DSA, will evaluate for the need to repeat US+kidney bx later this week based on renal function. Tx neph f/up this week    Darrel Patino MD  Date of Service (when I saw the patient): 06/25/23    Interval History   Mr. Quinones's creatinine is 2.98 (06/25 0516); Trend down.  Good urine output.  Other significant labs/tests/vitals: VSS  no events overnight.  No chest pain or shortness of breath.  Improving leg swelling.  No nausea and vomiting.  Bowel movements are No.  No fever, sweats or chills.    Review of Systems   4 point ROS was obtained and negative except as noted in the Interval History.    MEDICATIONS:    allopurinol  100 mg Oral Daily     aspirin  325 mg Oral or Feeding Tube Daily     atorvastatin  20 mg Oral or Feeding Tube QPM     bumetanide  2 mg Oral TID     diclofenac  4 g Topical TID     heparin  3 mL Intracatheter Q24H     insulin aspart   Subcutaneous TID w/meals     insulin aspart  1-12 Units Subcutaneous Q4H     insulin glargine  10 Units Subcutaneous At Bedtime     magnesium oxide  400 mg Oral Daily     metoprolol tartrate  12.5 mg Oral or Feeding Tube Q8H KARAN     multivitamin RENAL  1 tablet Oral or  "Feeding Tube Daily     mycophenolate  750 mg Oral BID IS     nystatin  500,000 Units Oral 4x Daily     pantoprazole  40 mg Oral QAM AC     [START ON 2023] predniSONE  10 mg Per Feeding Tube Daily     predniSONE  30 mg Oral Daily    Followed by     [START ON 2023] predniSONE  20 mg Oral Daily     sodium chloride (PF)  10-40 mL Intracatheter Q7 Days     sodium chloride (PF)  3 mL Intracatheter Q8H     sodium chloride (PF)  3 mL Intravenous Q8H     sulfamethoxazole-trimethoprim  1 tablet Oral or Feeding Tube Once per day on      tacrolimus  2 mg Oral BID IS     ursodiol  250 mg Oral or Feeding Tube BID     [START ON 2023] valGANciclovir  450 mg Oral Every Other Day     warfarin ANTICOAGULANT  3 mg Oral ONCE at 18:00     Warfarin Therapy Reminder  1 each Oral See Admin Instructions       dextrose       heparin 1,700 Units/hr (23)       Physical Exam   Temp  Av.9  F (36.6  C)  Min: 97  F (36.1  C)  Max: 99.2  F (37.3  C)  Arterial Line BP  Min: 60/45  Max: 131/80  Arterial Line MAP (mmHg)  Av mmHg  Min: 52 mmHg  Max: 101 mmHg      Pulse  Av.5  Min: 68  Max: 159 Resp  Av.8  Min: 14  Max: 23  FiO2 (%)  Av.3 %  Min: 40 %  Max: 50 %  SpO2  Av.1 %  Min: 92 %  Max: 100 %    CVP (mmHg): 6 mmHgBP 120/83 (BP Location: Left leg)   Pulse 100   Temp 97.7  F (36.5  C) (Oral)   Resp 17   Ht 1.702 m (5' 7\")   Wt 98.5 kg (217 lb 2.5 oz)   SpO2 98%   BMI 34.01 kg/m      Admit Weight: 102.3 kg (225 lb 8 oz)     GENERAL APPEARANCE: alert and no distress  RESP: lungs clear to auscultation - no rales, rhonchi or wheezes  CV: regular rhythm, normal rate, no rub, no murmur  EDEMA: trace to 1+ LE edema bilaterally, but improved from previous days.   ABDOMEN: soft, nondistended, mildly tender, bowel sounds normal  MS: extremities normal - no gross deformities noted, no evidence of inflammation in joints, no muscle tenderness  SKIN: no rash but multiple hematomas on " several parts of his body including neck, arm slowly fading.   TX KIDNEY: mild TTP    Data   All labs reviewed by me.  CMP  Recent Labs   Lab 06/25/23  1203 06/25/23  0816 06/25/23  0516 06/25/23  0457 06/24/23  0833 06/24/23  0731 06/23/23  0800 06/23/23  0449 06/22/23  1937 06/22/23  1623 06/22/23  0814 06/22/23  0503   NA  --   --  134*  --   --  132*  --  132*  --   --   --  131*   POTASSIUM  --   --  3.9  --   --  3.9  --  4.0  --  4.5  --  3.4   CHLORIDE  --   --  88*  --   --  88*  --  90*  --   --   --  90*   CO2  --   --  29  --   --  27  --  25  --   --   --  23   ANIONGAP  --   --  17*  --   --  17*  --  17*  --   --   --  18*   * 103* 131* 127*   < > 122*   < > 106*   < >  --    < > 156*   BUN  --   --  89.2*  --   --  83.6*  --  80.9*  --   --   --  73.1*   CR  --   --  2.98*  --   --  3.07*  --  3.21*  --   --   --  3.02*   GFRESTIMATED  --   --  23*  --   --  22*  --  21*  --   --   --  22*   NAZARIO  --   --  9.2  --   --  9.0  --  8.9  --   --   --  9.0   MAG  --   --  1.5*  --   --  1.5*  --  1.6*  --   --   --  1.7   PHOS  --   --  3.8  --   --  4.0  --  3.3  --   --   --  3.9   PROTTOTAL  --   --  6.1*  --   --  5.9*  --  5.7*  --   --   --  5.6*   ALBUMIN  --   --  3.0*  --   --  2.7*  --  2.7*  --   --   --  2.4*   BILITOTAL  --   --  0.9  --   --  1.0  --  1.0  --   --   --  1.0   ALKPHOS  --   --  429*  --   --  393*  --  315*  --   --   --  298*   AST  --   --  58*  --   --  61*  --  49*  --   --   --  48*   ALT  --   --  118*  --   --  100*  --  82*  --   --   --  83*    < > = values in this interval not displayed.     CBC  Recent Labs   Lab 06/25/23  0516 06/24/23  0731 06/23/23  0449 06/22/23  0503   HGB 7.6* 7.2* 7.0* 7.4*   WBC 10.6 10.2 10.3 14.4*   RBC 2.53* 2.39* 2.40* 2.39*   HCT 24.6* 22.9* 23.2* 23.8*   MCV 97 96 97 100   MCH 30.0 30.1 29.2 31.0   MCHC 30.9* 31.4* 30.2* 31.1*   RDW 18.9* 19.0* 19.4* 19.0*    281 252 235     INR  Recent Labs   Lab 06/25/23  0516  06/24/23  0731 06/23/23  0449 06/22/23  0813 06/19/23  0354 06/18/23  1512   INR 1.09 1.12 1.03 1.04   < > 1.11   PTT  --   --   --   --   --  46*    < > = values in this interval not displayed.     ABG  No lab results found in last 7 days.   Urine Studies  Recent Labs   Lab Test 06/16/23  1220 06/10/23  1613 06/05/23  1620 04/07/23  1246   COLOR Yellow Orange* Yellow Yellow   APPEARANCE Cloudy* Cloudy* Clear Clear   URINEGLC 150* 30* Negative Negative   URINEBILI Negative Negative Negative Negative   URINEKETONE Negative Negative Negative Negative   SG 1.014 1.021 1.012 1.011   UBLD Large* Large* Moderate* Moderate*   URINEPH 5.5 5.5 5.5 5.5   PROTEIN 70* 100* 10* 10*   NITRITE Negative Negative Negative Negative   LEUKEST Large* Large* Negative Negative   RBCU >182* >182* 2 5*   WBCU 56* 128* 7* 2     No lab results found.  PTH  Recent Labs   Lab Test 05/19/23  0956   PTHI 67*     Iron Studies  Recent Labs   Lab Test 11/22/22  0848   IRON 68   *   IRONSAT 31   HOLA 429*       IMAGING:  All imaging studies reviewed by me.

## 2023-06-25 NOTE — PLAN OF CARE
"/80 (BP Location: Left leg)   Pulse 99   Temp 98.6  F (37  C) (Oral)   Resp 16   Ht 1.702 m (5' 7\")   Wt 98.6 kg (217 lb 6 oz)   SpO2 98%   BMI 34.05 kg/m      Shift: 2644-0403  VS: Stable on RA, afebrile  Neuro: AOx4  BG: ACHS  Labs: WDL  Respiratory: WDL  Pain/Nausea/PRN: Patient c/o low back pain, robaxin given with good relief.  Diet: Regular diet with carb count.  LDA: L PICC - double lumen - heparin locked. R PIV - infusing heparin. Dialysis CVC R chest.  GI/: Urinal at bedside. BM today.  Skin: Widespread bruising, edema. Abdominal incisions EDUARDO.  Mobility: Up with assist of 2 and lift.  Plan: Continue to prepare for discharge to TCU.     Handoff given to following RN.    "

## 2023-06-25 NOTE — PHARMACY-TRANSPLANT NOTE
Solid Organ Transplant Recipient Prior to Discharge Note    67 year old male s/p liver/kidney transplant on 6/6/2023    Induction with thymo 4 mg/kg total  Maintenance immunosuppression: Tac/MMF/Pred taper to maintenance dose for patient's RA  Prophylaxis: Valcyte/Bactrim    Pharmacy has monitored for medication interactions and immunosuppression levels in conjunction with the multidisciplinary team. In anticipation for discharge, medication therapy needs have been addressed daily throughout the current admission via multidisciplinary rounds and/or discussions, order verification, daily clinical pharmacy review, and communication with prescribers.

## 2023-06-25 NOTE — PLAN OF CARE
Goal Outcome Evaluation:        Pt A&O x4. Intermittently confused in evening. Sating 97% on RA. Pleasant and cooperative with care. Wife in room assisting with cares and admission. VSS, but tachycardic. Denies CP, SOB, N/V.  Takes pills whole with thin liquids. Regular diet, ate supper with good appetite. On BS checks. , 311, 283. Sliding scale and carb coverage insulin administered. Pt on prednisone and bumex. Continent of B&B. Uses urinal at bedside, BSC, and bed pan. LBM 6/25 on morning shift. On RN managed heparin protocol. Heparin drip running without difficulty. Next lab draw for Anti- Xa scheduled for  0200. Pt states he has NIKOS and requested to be on oxygen at HS. Currently on 2LPM via NC while sleeping.     Skin: Appears pale and karli. Thinning skin on forearms. Multiple bruises on upper chest, ABD, arms, shoulders, and legs. Skin tear on Rt. Forearm, meplex intact. Scabs scattered on arms, shoulders, chest, previous PEG tube site, and previous drain sites. Upper ABD incision (liver transplant site) intact and EDUARDO with staples. Incision to lower abdomen w/ staples, noted slight bleeding/ redness on right side of incision (abut an inch opened) - cleaned and covered with meplex. Umbilical hernia bulge. Has gout flare w/ scan on side of right foot, dressing applied. Gout flares prominent on both knees. Bruise on inner left buttocks. Eva size wound on right buttocks, dressing applied.      Lines:   CVC x2 lumen Right external jugular, L arm x2 lumen PICC, R upper arm PIV (used for heparin drip).         Patient's most recent vital signs are:     Vital signs:  BP: 117/68  Temp: 96.7  HR: 98  RR: 16  SpO2: 97 %     Patient does not have new respiratory symptoms.  Patient does not have new sore throat.  Patient does not have a fever greater than 99.5.

## 2023-06-26 ENCOUNTER — APPOINTMENT (OUTPATIENT)
Dept: OCCUPATIONAL THERAPY | Facility: SKILLED NURSING FACILITY | Age: 68
End: 2023-06-26
Attending: INTERNAL MEDICINE
Payer: COMMERCIAL

## 2023-06-26 ENCOUNTER — DOCUMENTATION ONLY (OUTPATIENT)
Dept: ANTICOAGULATION | Facility: CLINIC | Age: 68
End: 2023-06-26
Payer: COMMERCIAL

## 2023-06-26 ENCOUNTER — APPOINTMENT (OUTPATIENT)
Dept: PHYSICAL THERAPY | Facility: SKILLED NURSING FACILITY | Age: 68
End: 2023-06-26
Attending: INTERNAL MEDICINE
Payer: COMMERCIAL

## 2023-06-26 ENCOUNTER — TELEPHONE (OUTPATIENT)
Dept: TRANSPLANT | Facility: CLINIC | Age: 68
End: 2023-06-26

## 2023-06-26 DIAGNOSIS — Z94.0 KIDNEY REPLACED BY TRANSPLANT: Primary | ICD-10-CM

## 2023-06-26 DIAGNOSIS — I48.20 CHRONIC ATRIAL FIBRILLATION (H): Primary | ICD-10-CM

## 2023-06-26 LAB
GLUCOSE BLDC GLUCOMTR-MCNC: 136 MG/DL (ref 70–99)
GLUCOSE BLDC GLUCOMTR-MCNC: 204 MG/DL (ref 70–99)
GLUCOSE BLDC GLUCOMTR-MCNC: 220 MG/DL (ref 70–99)
GLUCOSE BLDC GLUCOMTR-MCNC: 296 MG/DL (ref 70–99)
INR PPP: 1.1 (ref 0.85–1.15)
UFH PPP CHRO-ACNC: 0.36 IU/ML
UFH PPP CHRO-ACNC: 0.4 IU/ML
UFH PPP CHRO-ACNC: 0.54 IU/ML

## 2023-06-26 PROCEDURE — 36592 COLLECT BLOOD FROM PICC: CPT | Performed by: INTERNAL MEDICINE

## 2023-06-26 PROCEDURE — 250N000011 HC RX IP 250 OP 636: Mod: JZ | Performed by: INTERNAL MEDICINE

## 2023-06-26 PROCEDURE — 97535 SELF CARE MNGMENT TRAINING: CPT | Mod: GO

## 2023-06-26 PROCEDURE — 250N000012 HC RX MED GY IP 250 OP 636 PS 637: Performed by: INTERNAL MEDICINE

## 2023-06-26 PROCEDURE — 250N000013 HC RX MED GY IP 250 OP 250 PS 637: Performed by: INTERNAL MEDICINE

## 2023-06-26 PROCEDURE — 85610 PROTHROMBIN TIME: CPT | Performed by: INTERNAL MEDICINE

## 2023-06-26 PROCEDURE — 97162 PT EVAL MOD COMPLEX 30 MIN: CPT | Mod: GP

## 2023-06-26 PROCEDURE — 97166 OT EVAL MOD COMPLEX 45 MIN: CPT | Mod: GO

## 2023-06-26 PROCEDURE — 97165 OT EVAL LOW COMPLEX 30 MIN: CPT | Mod: GO

## 2023-06-26 PROCEDURE — 85520 HEPARIN ASSAY: CPT | Performed by: INTERNAL MEDICINE

## 2023-06-26 PROCEDURE — 250N000009 HC RX 250: Performed by: INTERNAL MEDICINE

## 2023-06-26 PROCEDURE — 120N000009 HC R&B SNF

## 2023-06-26 PROCEDURE — 97530 THERAPEUTIC ACTIVITIES: CPT | Mod: GP

## 2023-06-26 PROCEDURE — 99306 1ST NF CARE HIGH MDM 50: CPT | Mod: AI | Performed by: INTERNAL MEDICINE

## 2023-06-26 PROCEDURE — 97110 THERAPEUTIC EXERCISES: CPT | Mod: GP

## 2023-06-26 RX ORDER — PREDNISONE 5 MG/1
10 TABLET ORAL DAILY
Status: DISCONTINUED | OUTPATIENT
Start: 2023-06-30 | End: 2023-07-03

## 2023-06-26 RX ORDER — PREDNISONE 20 MG/1
20 TABLET ORAL DAILY
Status: COMPLETED | OUTPATIENT
Start: 2023-06-27 | End: 2023-06-29

## 2023-06-26 RX ORDER — MAGNESIUM OXIDE 400 MG/1
400 TABLET ORAL 2 TIMES DAILY
Status: DISCONTINUED | OUTPATIENT
Start: 2023-06-26 | End: 2023-07-24

## 2023-06-26 RX ADMIN — INSULIN ASPART 4 UNITS: 100 INJECTION, SOLUTION INTRAVENOUS; SUBCUTANEOUS at 09:27

## 2023-06-26 RX ADMIN — INSULIN GLARGINE 10 UNITS: 100 INJECTION, SOLUTION SUBCUTANEOUS at 22:02

## 2023-06-26 RX ADMIN — NYSTATIN 500000 UNITS: 500000 SUSPENSION ORAL at 21:17

## 2023-06-26 RX ADMIN — PANTOPRAZOLE SODIUM 40 MG: 40 TABLET, DELAYED RELEASE ORAL at 06:04

## 2023-06-26 RX ADMIN — HEPARIN SODIUM 1650 UNITS/HR: 10000 INJECTION, SOLUTION INTRAVENOUS at 03:32

## 2023-06-26 RX ADMIN — NYSTATIN 500000 UNITS: 500000 SUSPENSION ORAL at 08:26

## 2023-06-26 RX ADMIN — Medication 400 MG: at 12:19

## 2023-06-26 RX ADMIN — ASPIRIN 325 MG: 325 TABLET, FILM COATED ORAL at 08:26

## 2023-06-26 RX ADMIN — INSULIN ASPART 3 UNITS: 100 INJECTION, SOLUTION INTRAVENOUS; SUBCUTANEOUS at 15:44

## 2023-06-26 RX ADMIN — HEPARIN SODIUM 1650 UNITS/HR: 10000 INJECTION, SOLUTION INTRAVENOUS at 19:40

## 2023-06-26 RX ADMIN — Medication 12.5 MG: at 15:48

## 2023-06-26 RX ADMIN — NYSTATIN 500000 UNITS: 500000 SUSPENSION ORAL at 12:19

## 2023-06-26 RX ADMIN — Medication 5 UNITS: at 08:31

## 2023-06-26 RX ADMIN — INSULIN ASPART 7 UNITS: 100 INJECTION, SOLUTION INTRAVENOUS; SUBCUTANEOUS at 17:41

## 2023-06-26 RX ADMIN — MAGNESIUM OXIDE TAB 400 MG (241.3 MG ELEMENTAL MG) 400 MG: 400 (241.3 MG) TAB at 21:18

## 2023-06-26 RX ADMIN — INSULIN ASPART 3 UNITS: 100 INJECTION, SOLUTION INTRAVENOUS; SUBCUTANEOUS at 15:45

## 2023-06-26 RX ADMIN — ATORVASTATIN CALCIUM 20 MG: 10 TABLET, FILM COATED ORAL at 21:18

## 2023-06-26 RX ADMIN — URSODIOL 250 MG: 250 TABLET ORAL at 21:18

## 2023-06-26 RX ADMIN — URSODIOL 250 MG: 250 TABLET ORAL at 08:26

## 2023-06-26 RX ADMIN — PREDNISONE 30 MG: 5 TABLET ORAL at 08:25

## 2023-06-26 RX ADMIN — WARFARIN SODIUM 3 MG: 2.5 TABLET ORAL at 17:47

## 2023-06-26 RX ADMIN — DICLOFENAC SODIUM 4 G: 10 GEL TOPICAL at 08:25

## 2023-06-26 RX ADMIN — INSULIN ASPART 1 UNITS: 100 INJECTION, SOLUTION INTRAVENOUS; SUBCUTANEOUS at 22:01

## 2023-06-26 RX ADMIN — MYCOPHENOLATE MOFETIL 750 MG: 250 CAPSULE ORAL at 08:26

## 2023-06-26 RX ADMIN — BUMETANIDE 2 MG: 1 TABLET ORAL at 08:25

## 2023-06-26 RX ADMIN — NYSTATIN 500000 UNITS: 500000 SUSPENSION ORAL at 17:48

## 2023-06-26 RX ADMIN — ALLOPURINOL 100 MG: 100 TABLET ORAL at 08:26

## 2023-06-26 RX ADMIN — TACROLIMUS 2 MG: 1 CAPSULE ORAL at 17:47

## 2023-06-26 RX ADMIN — B-COMPLEX W/ C & FOLIC ACID TAB 1 MG 1 TABLET: 1 TAB at 12:19

## 2023-06-26 RX ADMIN — BUMETANIDE 2 MG: 1 TABLET ORAL at 21:17

## 2023-06-26 RX ADMIN — Medication 12.5 MG: at 06:05

## 2023-06-26 RX ADMIN — SULFAMETHOXAZOLE AND TRIMETHOPRIM 1 TABLET: 400; 80 TABLET ORAL at 08:25

## 2023-06-26 RX ADMIN — TACROLIMUS 2 MG: 1 CAPSULE ORAL at 08:26

## 2023-06-26 RX ADMIN — MYCOPHENOLATE MOFETIL 750 MG: 250 CAPSULE ORAL at 17:47

## 2023-06-26 ASSESSMENT — ACTIVITIES OF DAILY LIVING (ADL)
ADLS_ACUITY_SCORE: 35
ADLS_ACUITY_SCORE: 35
ADLS_ACUITY_SCORE: 28
ADLS_ACUITY_SCORE: 30
ADLS_ACUITY_SCORE: 30
ADLS_ACUITY_SCORE: 35
ADLS_ACUITY_SCORE: 35
ADLS_ACUITY_SCORE: 30
ADLS_ACUITY_SCORE: 35
ADLS_ACUITY_SCORE: 30
BADLS,_PREVIOUS_FUNCTIONAL_LEVEL: INDEPENDENT
IADLS,_PREVIOUS_FUNCTIONAL_LEVEL: PARTIAL ASSISTANCE
ADLS_ACUITY_SCORE: 30
ADLS_ACUITY_SCORE: 35

## 2023-06-26 NOTE — PROGRESS NOTES
ANTICOAGULATION  MANAGEMENT: Discharge Review    Damion Quinones chart reviewed for anticoagulation continuity of care    Hospital Admission on 6/5/23 - 6/25/23 for Liver transplant, kidney transplant, +.    Discharge disposition: TCU    Results:    Recent labs: (last 7 days)     06/19/23  1846 06/20/23  0344 06/21/23  0410 06/21/23  1241 06/21/23  1554 06/21/23  2331 06/22/23  0503 06/22/23  0813 06/22/23  1623 06/22/23  2228 06/23/23  0449 06/23/23  1002 06/23/23  1619 06/24/23  0731 06/24/23  1437 06/24/23  2136 06/25/23  0516 06/25/23  1130 06/25/23  1620 06/26/23  0205   INR  --  1.08  --   --   --   --   --  1.04  --   --  1.03  --   --  1.12  --   --  1.09  --   --  1.10   AAUFH 0.25 <0.10 0.17 0.16 0.13 0.27 0.25 0.21 <0.10 0.22 0.26 0.23 0.32 0.39 0.22 0.41 0.27 0.29 0.23 0.54     Anticoagulation inpatient management:     held warfarin due to surgery  and resumed on 6/22/23    Anticoagulation discharge instructions:     Warfarin dosing: Less warfarin given   Bridging: No   INR goal change: Yes: 1.5-2.0 due to increased risk of bleeding      Medication changes affecting anticoagulation: Yes: ASA increased to 3.25 mg daily, Prednisone taper, Spironolactone discontinued, Bumex increased, Omeprazole stopped    Additional factors affecting anticoagulation: Yes: Small bowel feeding tube placed 6/7/23 due to malnutrition     PLAN     No adjustment to anticoagulation plan needed at this time due to TCU admit.    ACC will resume monitoring upon discharge from TCU    Anticoagulation Calendar updated    Alejandra Naylor RN

## 2023-06-26 NOTE — PHARMACY-MEDICATION REGIMEN REVIEW
Pharmacy Medication Regimen Review  Damion Quinones is a 67 year old male who is currently in the Transitional Care Unit.    Assessment: Upon review of the medications and patient chart the following irregularities were found:     Significant Drug Interactions: warfarin--->Bactrim, heparin, pantoprazole, prednisone all interact with warfarin--->will watch INR & adjust warfarin accordingly if medications are stopped or started that interact.    Plan:   Continue current medications/plan.    Attending provider will be sent this note for review.  If there are any emergent issues noted above, pharmacist will contact provider directly by phone.      Pharmacy will periodically review the resident's medication regimen for any PRN medications not administered in > 72 hours and discontinue them. The pharmacist will discuss gradual dose reductions of psychopharmacologic medications with interdisciplinary team on a regular basis.    Please contact pharmacy if the above does not answer specific medication questions/concerns.  Marisela Roth PharmD, BCPS    Background:  A pharmacist has reviewed all medications and pertinent medical history today.  Medications were reviewed for appropriate use and any irregularities found are listed with recommendations.      Current Facility-Administered Medications:      [START ON 6/28/2023] - Skin Test Reading -, , Does not apply, Q21 Days, Toni Sheth MD     acetaminophen (TYLENOL) Suppository 650 mg, 650 mg, Rectal, Q4H PRN, Toni Sheth MD     acetaminophen (TYLENOL) tablet 650 mg, 650 mg, Oral, Q4H PRN, Toni Sheth MD     albuterol (PROVENTIL HFA/VENTOLIN HFA) inhaler, 2 puff, Inhalation, Q6H PRN, Toni Sheth MD     allopurinol (ZYLOPRIM) tablet 100 mg, 100 mg, Oral, Daily, Toni Sheth MD, 100 mg at 06/26/23 0826     aspirin (ASA) tablet 325 mg, 325 mg, Oral or Feeding Tube, Daily, Toni Sheth MD, 325 mg at 06/26/23 0826     atorvastatin (LIPITOR) tablet 20 mg, 20 mg,  Oral or Feeding Tube, QPM, Toni Sheth MD, 20 mg at 06/25/23 2023     bisacodyl (DULCOLAX) suppository 10 mg, 10 mg, Rectal, Daily PRN, Toni Sheth MD     bumetanide (BUMEX) tablet 2 mg, 2 mg, Oral, BID, Toni Sheth MD, 2 mg at 06/26/23 0825     glucose gel 15-30 g, 15-30 g, Oral, Q15 Min PRN **OR** dextrose 50 % injection 25-50 mL, 25-50 mL, Intravenous, Q15 Min PRN **OR** glucagon injection 1 mg, 1 mg, Subcutaneous, Q15 Min PRN, Toni Sheth MD     diclofenac (VOLTAREN) 1 % topical gel 4 g, 4 g, Topical, TID, Toni Sheth MD, 4 g at 06/26/23 0825     heparin infusion 25,000 units in D5W 250 mL ANTICOAGULANT, 0-5,000 Units/hr, Intravenous, Continuous, Toni Sheth MD, Last Rate: 16.5 mL/hr at 06/26/23 1042, 1,650 Units/hr at 06/26/23 1042     insulin aspart (NovoLOG) injection (RAPID ACTING), , Subcutaneous, Daily with breakfast, Fabian Yancey MD, 4 Units at 06/26/23 0927     insulin aspart (NovoLOG) injection (RAPID ACTING), , Subcutaneous, Daily with lunch, Fabian Yancey MD     insulin aspart (NovoLOG) injection (RAPID ACTING), , Subcutaneous, Daily with supper, Fabian Yancey MD, 2 Units at 06/25/23 1713     insulin aspart (NovoLOG) injection (RAPID ACTING), 1-10 Units, Subcutaneous, TID AC, Fabian Yancey MD, 6 Units at 06/25/23 1707     insulin aspart (NovoLOG) injection (RAPID ACTING), 1-7 Units, Subcutaneous, At Bedtime, Fabian Yancey MD, 4 Units at 06/25/23 2157     insulin glargine (LANTUS PEN) injection 10 Units, 10 Units, Subcutaneous, At Bedtime, Toni Sheth MD, 10 Units at 06/25/23 2157     magnesium oxide (MAG-OX) tablet 400 mg, 400 mg, Oral, BID, Toni Sheth MD     methocarbamol (ROBAXIN) tablet 500 mg, 500 mg, Oral or Feeding Tube, 4x Daily PRN, Toni Sheth MD     metoprolol tartrate (LOPRESSOR) half-tab 12.5 mg, 12.5 mg, Oral or Feeding Tube, Q8H, Toni Sheth MD, 12.5 mg at 06/26/23 0605     multivitamin RENAL (RENAVITE RX/NEPHROVITE) tablet 1 tablet, 1 tablet,  Oral or Feeding Tube, Daily, Toni Sheth MD, 1 tablet at 06/26/23 1219     mycophenolate (GENERIC EQUIVALENT) capsule 750 mg, 750 mg, Oral, BID, Toni Sheth MD, 750 mg at 06/26/23 0826     naloxone (NARCAN) injection 0.2 mg, 0.2 mg, Intravenous, Q2 Min PRN **OR** naloxone (NARCAN) injection 0.4 mg, 0.4 mg, Intravenous, Q2 Min PRN **OR** naloxone (NARCAN) injection 0.2 mg, 0.2 mg, Intramuscular, Q2 Min PRN **OR** naloxone (NARCAN) injection 0.4 mg, 0.4 mg, Intramuscular, Q2 Min PRN, Toni Sheth MD     Nurse may request from Pharmacy a change of form of medication (e.g. Liquid to tablet)., , Does not apply, Continuous PRN, Toni Sheth MD     nystatin (MYCOSTATIN) suspension 500,000 Units, 500,000 Units, Oral, 4x Daily, Toni Sheth MD, 500,000 Units at 06/26/23 1219     ondansetron (ZOFRAN ODT) ODT tab 4 mg, 4 mg, Oral, Q6H PRN **OR** ondansetron (ZOFRAN) injection 4 mg, 4 mg, Intravenous, Q6H PRN, Toni Sheth MD     oxyCODONE IR (ROXICODONE) half-tab 2.5 mg, 2.5 mg, Oral or Feeding Tube, Q4H PRN, Toni Sheth MD     pantoprazole (PROTONIX) EC tablet 40 mg, 40 mg, Oral, QAM AC, Toni Sheth MD, 40 mg at 06/26/23 0604     Patient is already receiving anticoagulation with heparin, enoxaparin (LOVENOX), warfarin (COUMADIN)  or other anticoagulant medication, , Does not apply, Continuous PRN, Toni Sheth MD     polyethylene glycol (MIRALAX) Packet 17 g, 17 g, Oral, Daily, Toni Sheth MD     [START ON 6/30/2023] predniSONE (DELTASONE) tablet 10 mg, 10 mg, Oral, Daily, Toni Sheth MD     [START ON 6/27/2023] predniSONE (DELTASONE) tablet 20 mg, 20 mg, Oral, Daily, Toni Sheth MD     simethicone (MYLICON) chewable tablet 80 mg, 80 mg, Oral or Feeding Tube, Q6H PRN, Toni Sheth MD     sulfamethoxazole-trimethoprim (BACTRIM) 400-80 MG per tablet 1 tablet, 1 tablet, Oral or Feeding Tube, Once per day on Mon Wed Fri, Toni Sheth MD, 1 tablet at 06/26/23 0825     tacrolimus  (GENERIC EQUIVALENT) capsule 2 mg, 2 mg, Oral, BID, Toni Sheth MD, 2 mg at 06/26/23 0826     tuberculin injection 5 Units, 5 Units, Intradermal, Q21 Days, Toni Sheth MD, 5 Units at 06/26/23 0831     ursodiol (ACTIGALL) tablet 250 mg, 250 mg, Oral, BID, Toni Sheth MD, 250 mg at 06/26/23 0826     valGANciclovir (VALCYTE) tablet 450 mg, 450 mg, Oral, Every Other Day, Toni Sheth MD, 450 mg at 06/25/23 2023     warfarin ANTICOAGULANT (COUMADIN) half-tab 3 mg, 3 mg, Oral, ONCE at 18:00, Toni Sheth MD     Warfarin Dose Required Daily - Pharmacist Managed, 1 each, Oral, See Admin Instructions, Toni Sheth MD  No current outpatient prescriptions on file.

## 2023-06-26 NOTE — PHARMACY-TCU REVIEW OF H&P
I have reviewed this patient's TCU admission History & Physical for medication related changes/recommendations identified by the admitting provider.  I am confirming that there are no recommendations requiring changes to medication orders are indicated at this time based on the provider recommendations in the H&P.    Marisela Roth, PharmD, BCPS

## 2023-06-26 NOTE — PLAN OF CARE
Pt is alert and oriented x4. Able to make needs known. Denies pain, cp or SOB. Abdominal incision w/ staples, CDI and EDUARDO. R foot w/ wound cleansed, mepilex applied. Reported its gout flare. On regular diet w/ good appetite. W/ Heparin drip currently running at 16.5ml/hr. Hep 10A level 0.36, no changes made, next recheck scheduled at 1642, oncoming RN updated. Spouse is at bedside, attentive to pt cares. Pt has an appointment tomorrow for liver transplant follow up, pt and spouse aware, reported he is comfortable using wheelchair, per PT, pt can tolerate sitting up in chair. Call light is in reach, continue w/ POC.

## 2023-06-26 NOTE — PROGRESS NOTES
Social Work: Initial Assessment with Discharge Plan    Patient Name: Damion Quinones  : 1955  Age: 67 year old  MRN: [id number]  Completed assessment with: Chart review and pt interview.  Admitted to TCU: 2023    Presenting Information   Date of SW assessment: 2023  Health Care Directive: Provided education  Primary Health Care Agent: Self  Secondary Health Care Agent: NOK, spouse Apoorva  Living Situation: Pt and his wife Apoorva live together in a house in [place], [place]. Ramp to enter. Multi level home, will need to use stairs to get to either bathroom. They have lived at this residence for the past thirty years.  Previous Functional Status: Pt reports he is mostly independent - pt receives assistance with his medications from his wife.   DME available: A walker as needed  Patient and family understanding of hospitalization: Appropriate and pleasant.  Cultural/Language/Spiritual Considerations: 67 year old  male, English speaking, , and [employer] kaushal.  Abuse concerns: None reported.  -------------------------------------------------------------------------------------------------------------  TRANSPORTATION:    Has lack of transportation kept you from medical appointments, meetings, work, or from getting things needed for daily living?  A. Yes, it has kept me from medical appointments or from getting my medications  B. Yes, it has kept me from non-medical meetings, appointments, work or from getting things that I need  C. No  X. Patient Unable to respond  Y. Patient declines to respond  -------------------------------------------------------------------------------------------------------------  Health Literacy:   How Often do you need to have someone help you when you read instructions, pamphlets, or other written material from your doctor or pharmacy?  0.       Never  1.       Rarely  2.       Sometimes  3.       Often  4.       Always  5.       Patient declines to  "respond  6.       Patient unable to respond  ------------------------------------------------------------------------------------------------------------   BIMS:  See flow sheet   Tell the pt : \"I am going to say three words for you to remember.  Please repeat the words after I have said all three.  The words are:Sock, Blue and Bed. Now tell me the three words.\"     Number of words repeated after the first attempt.  0: None   1: One  2: Two  3: Three    Ask the pt: \"Please tell me what year it is right now?\"  0: Missed by 5 >5 years or no answer   1: Missed by 2-5 years  2: Missed by a 1 year  3: Correct      Ask the pt: \"What month are we in right now?\"  0: Missed by > 1 month or no answer.  1: Missed by 6 days to 1 month  2: Accurate within 5 days.     Ask pt: \"what day of the week is today?\"  0: Incorrect day of the week.  1: Correct.     Ask pt:\" Let's go back to an earlier question.  What were those three words that I asked you to repeat?\" If unable to remember a word, give a cue (something to wear, a color, a piece of furniture) for that word.   Able to recall Sock.  0: No, could not recall.  1: Yes, after cueing (something to wear)  2: Yes, no cueing required.     Able to recall Blue.  0: No, could not recall.  1:Yes, after cueing (a color)  2:Yes, no cueing required.     Able to recall Bed.  0: No,  1: Yes, after cueing (a piece of furniture)  2: Yes, no cueing required.   -----------------------------------------------------------------------------------------------------------  CAM:  1.) Acute Onset/Fluctuating Course:  Is there evidence of an acute change in mental status from the patient's baseline?  0. No  1. Yes  2.) Inattention:  Does the patient have difficulty focusing attention, for example, being easily distractable, or having difficulty keeping track of what was being said?  0. No - Behavior not present  1. Yes - Behavior present  3.) Disorganized Thinking:  Is the patient's speech disorganized " or incoherent, such as rambling or irrelevant conversation, unclear or illogical flow of ideas, or unpredictable switching from subject to subject?  0. No - Behavior not present  1. Yes - Behavior present  4.) Altered level of consciousness:  Did the patient have altered level of consciousness as indicated by any of the following criteria?  0. No - Alert  1. Yes - Vigilant (hyperalert), lethargic (drowsy) , stupor (difficult to arouse) or comatose (unable to be aroused)  -------------------------------------------------------------------------------------------------------------  PHQ-9:   Over the last 2 weeks, have you been bothered by any of the following problems?     If symptom is present, then ask the patient:  About how often have you been bothered by this?   Symptom Presence                                              Symptom Frequency  0. No                                                                     0. Never or 1 day  1. Yes                                                                   1. 2-6 days (several days)  9. No Response                                                    2. 7-11 days (half or more of the days)                                                                                3. 12-14 days (nearly every day)       Present Frequency   A.       Little interest of pleasure doing things  0     B.       Feeling down, depressed, or hopeless  0     C.       Trouble falling or staying asleep, or sleeping too much  0     D.       Feeling tired or having little energy  0     E.       Poor appetite or overeating  0     F.        Feeling bad about yourself - or that you are a failure, or have let yourself or your family down  0     G.      Trouble concentrating on things, such as reading the newspaper or watching television  0     H.      Moving or speaking so slowly that other people could have noticed. Or the opposite - being so fidgety or restless that you have been moving around a  lot more than usual  0     I.         Thoughts that you would be better off dead, or of hurting yourself in some way  0       -------------------------------------------------------------------------------------------------------------  SOCIAL ISOLATION  How often do you feel lonely or isolated form those around you?  0.       Never  1.       Rarely  2.       Sometimes  3.       Often  4.       Always  5.       Patient declines to respond  6.       Patient unable to respond  -------------------------------------------------------------------------------------------------------------    BIMS: Pt scored 14 on BIMS indicating Cognitively intact  PHQ-9: Pt scored 0 on PHQ-9 indicating no depression indicated  PAS: confirmation number- [id number]  Has there been a level II screen?  No  Were there any recommendations in the screen? N/A  If yes, will the recommendations we incorporated into the Plan of Care?  N/A  Physical Health  Reason for admission: Damion Quinones is a 67 year old male admitted on 6/25/2023 for ongoing rehabilitation after hospitalization at [facility].  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  He underwent  simultaneous liver kidney transplant on 6/6/2023. RTOR on 6/6/203 with concern for low flow in the renal graft - ex-lap, washout, closure with healthy appearing graft with intact arterial and venous flow.  He is hospital course was also complicated by renal failure requiring CRRT and intermittent hemodialysis, RIJ clot, atrial fibrillation, hypertension, fluid overload, hypoxemia, anemia, leukocytosis, malnutrition, steroids/tube feed induced hyperglycemia, gout, deconditioning.    Provider Information   Primary Care Physician:Gary Serrano @ [facility]  : MOUNIKA Sawant,  [facility]  Liver Transplant  ([phone])    Mental Health:   Diagnosis: Pt denies any hx or current concerns with anxiety or depression.  Current Support/Services: Pt denies.  Previous Services: None reported.  Services Needed/Recommended: Pt was offered [place] and Health Psychology services while at [facility].    Substance Use:  Diagnosis: Pt's last use of alcohol was New Year's Marcela 2020, one glass of champagne. He was previously sober since 2016. Pt reports he was drinking twelve beers per day prior to discontinuing all consumption in 2016. This was his pattern of drinking for a couple of years. Prior to this reports consuming an average of eighteen beers per week.  Current Support/Services: Pt denies.  Previous Services: None reported.  Services Needed/Recommended: Pt was offered [place] and Health Psychology services while at [facility].    Support System  Marital Status: , wife Apoorva  Family support: [place] [facility] [place] [facility] [place] [place] and daughter, Denice, lives in [place], [place]  Other support available: Pt reports he has no other family in the area. Wife is able to take time off of work.  Gaps in support system: None identified.    Personalized Care and Trauma  What other information would help us give you more personalized care or how can we help you with any past trauma? None reported.    Community Resources  Current in home services: Pt denies.  Previous services: None reported.    Financial/Employment/Education  Employment Status: Retired  Income Source: Pt receives Social Security intermediate benefits. His wife works full-time for [employer].  Education: Not discussed  Financial Concerns:  Pt denies.  Insurance: Medicare / BCBS Out of State    Discharge Plan   Patient and family discharge goal: Home  Provided Education on discharge plan: YES  Patient agreeable to discharge plan:  YES  A list of Medicare Certified Facilities was provided to the patient and/or family to encourage  patient choice. Based on location and rating, patient would like referrals made to: YES  General information regarding anticipated insurance coverage and possible out of pocket cost was discussed. Patient and patient's family are aware patient may incur the cost of transportation to the facility, pending insurance payment: YES  Barriers to discharge: TBD    Discharge Recommendations   Disposition: Home   Transportation Needs: Wife will provide  Name of Transportation Company and Phone: N/A    Additional comments   Was attending OP dialysis with [facility] in [facility]. Dialysis is not currently recommended.    Pt's wife was provided hours for the shuttle between [facility] and [facility].    JOSELIN Dior  Post Acute Float   [facility]    Phone: [phone]  Fax: [phone]

## 2023-06-26 NOTE — TELEPHONE ENCOUNTER
[facility]   Discharged to rehab today, please schedule for nephro f/up this week   Bumex dose reduced, line still in, Cr downtrending    Shannon Delacruz, Cindy Diaz, LORIN; Angelita Torres, RN  Plan for patient to taper prednisone down to 5 mg daily. Currently on short taper pred for acute gout right 5th toe. Discharged to rehab on Sunday. Tx GEORGE (liver) will follow tac level and graft function. Labs MWF. Thanks       Follow up appointments for post kidney transplant  Placed and scheduled   Updated lab orders to monitor for kidney transplant

## 2023-06-26 NOTE — PLAN OF CARE
Goal Outcome Evaluation:      Plan of Care Reviewed With: patient    Overall Patient Progress: no change    New admit yesterday. Alert and oriented during encounters. Denies any pain or discomfort except c/o bed as uncomfortable. Repositioned for comfort but refused to stay on his either side- unable to tolerate per pt. Heels kept off the mattress with pillows. Transverse abdominal incision and RLQ incision with staples EDUARDO. Small dressing above RLQ incision is CDI. Using urinal independently- voiding spontaneously with clear yellow/ straw colored urine- staff empties.Pt has no c/o SOB and no s/s of respiratory issue noted with O2 2LPM via nc- on/off during the night but pt unaware- spot check is 95% at RA but pt prefers to keep them on at night.  Appear to be sleeping/resting between cares/ meds. Continue with plan of care.    Received on Heparin drip at 1850 unit(s)/hr  via R upper arm PIV with Anti Xa level check (blood drawn via PICC) at 0200= 0.54. Heparin drip paused x 60 minutes and rate decreased by 200 unit(s)/hr per Heparin Infusion Adult Dose Adjustment by RN Protocol. Current rate is 1650 unit(s)/hr or 16.5 ml/hr as of 0330 with next Anti Xa level check at 0930. No s/s of bleeding noted. Will continue to monitor. Will endorsed to AM RN to watch for Anti Xa level result.       Patient's most recent vital signs are:     Vital signs:  BP: 117/68  Temp: 96.7  HR: 98  RR: 16  SpO2: 97 %     Patient does not have new respiratory symptoms.  Patient does not have new sore throat.  Patient does not have a fever greater than 99.5.

## 2023-06-26 NOTE — PROGRESS NOTES
CLINICAL NUTRITION SERVICES - ASSESSMENT NOTE     Nutrition Prescription    RECOMMENDATIONS FOR MDs/PROVIDERS TO ORDER:  Encourage oral intakes, offer/provide snacks/supplements PRN between meals    Malnutrition Status:    Moderate malnutrition in the context of acute on chronic illness    Recommendations already ordered by Registered Dietitian (RD):  PRN snacks/supplements  Encouraged adequate intakes, use of small frequent meals/snacks as needed    Future/Additional Recommendations:  Monitor labs, intakes, and weight trends.     REASON FOR ASSESSMENT  Damion Quinones is a/an 67 year old male assessed by the dietitian for Provider Order - Monitor and assess ongoing malnutrition issue    NUTRITION/MEDICAL HISTORY  History of decompensated alcohol + hemochromatosis (homozygous H63D) cirrhosis complicated by variceal bleeding s/p TIPS (10/1/2022) and hepatic encephalopathy + CKD (IgA nephropathy + ANCA vasculitis).  He underwent  simultaneous liver kidney transplant on 6/6/2023. RTOR on 6/6/203 with concern for low flow in the renal graft - ex-lap, washout, closure with healthy appearing graft with intact arterial and venous flow.  He is hospital course was also complicated by renal failure requiring CRRT and intermittent hemodialysis, RIJ clot, atrial fibrillation, hypertension, fluid overload, hypoxemia, anemia, leukocytosis, malnutrition, steroids/tube feed induced hyperglycemia, gout, deconditioning.    FINDINGS  RD met with pt and spouse in room. Pt with improving appetite and intakes. Normally eats food from facility but has occasionally had outside foods. Does report some menu fatigue. Encouraged pt to include outside foods as needed and oral nutrition supplements PRN.    CURRENT NUTRITION ORDERS  Diet: Regular    Intake/Tolerance: 75% of documented meals.   6/17       Total Kcals: 487       Total Protein: 18g   6/18       Total Kcals: 1025     Total Protein: 44g   6/19       Total Kcals: 418       Total  "Protein: 31g   6/20       Total Kcals: 188       Total Protein: 3g (+ 25% subway -was NPO for bx)   6/21       Total Kcals: 1606     Total Protein: 48g     LABS   06/24/23 07:31 06/25/23 05:16 06/27/23 06:51   Sodium 132 (L) 134 (L) 132 (L)   Potassium 3.9 3.9 3.6   Urea Nitrogen 83.6 (H) 89.2 (H) 83.4 (H)   Creatinine 3.07 (H) 2.98 (H) 2.82 (H)   Magnesium 1.5 (L) 1.5 (L) 1.3 (L)   Phosphorus 4.0 3.8 4.7 (H)   Albumin 2.7 (L) 3.0 (L) 2.9(L)   Alkaline Phosphatase 393 (H) 429 (H) 362 (H)    (H) 118 (H) 84 (H)   AST 61 (H) 58 (H) 33   Glucose 122 (H) 131 (H) 146 (H)     MEDICATIONS  Medications reviewed: bumex, insulin (novolog, lantus), mag ox, renal multivitamin, mycophenolate, protonix, prednisone, bactrim, tacrolimus, valgancyclovir, warfarin    ANTHROPOMETRICS  Height: 170.2 cm (5' 7\")  as of 6/5/2023   Most Recent Weight: 98.5 kg (217 lb 2.5 oz)  IBW: 67.3 kg (146% IBW)  BMI: Obesity Grade I BMI 30-34.9  Weight History: Pt with weight fluctuations during admission to [facility] likely fluid related, at similar weight to 2 months ago. Weight seems stable with some fluctuation over last 6 months.   Wt Readings from Last Encounters:   06/25/23 98.5 kg (217 lb 2.5 oz)   06/22/23 101.7 kg (224 lb 3.3 oz)   06/20/23 102.6 kg (226 lb 3.1 oz)   06/17/23 107.6 kg (237 lb 3.4 oz)   06/13/23 108.5 kg (239 lb 3.2 oz)   06/10/23 113.8 kg (250 lb 14.1 oz)   06/07/23 117.6 kg (259 lb 4.2 oz)   06/05/23 102.3 kg (225 lb 8 oz)   05/19/23 103.4 kg (228 lb)   05/17/23 104.3 kg (230 lb)   05/11/23 99.8 kg (220 lb)   05/10/23 106.9 kg (235 lb 9.6 oz)   05/01/23 102 kg (224 lb 12.8 oz)   04/27/23 98.9 kg (218 lb)   04/19/23 100.8 kg (222 lb 5 oz)   04/10/23 99 kg (218 lb 3.2 oz)   03/21/23 101.1 kg (222 lb 14.4 oz)   03/09/23 98.4 kg (217 lb)   03/03/23 99.7 kg (219 lb 11.2 oz)   03/01/23 99.8 kg (220 lb)   02/17/23 99.8 kg (220 lb)   02/09/23 96.5 kg (212 lb 11.9 oz)   02/09/23 96.2 kg (212 lb)   02/06/23 94.8 kg (209 lb 1 " oz)   02/01/23 96.6 kg (213 lb)   01/27/23 96.5 kg (212 lb 12.8 oz)   01/15/23 97.5 kg (215 lb)   12/27/22 97.7 kg (215 lb 6.4 oz)   11/22/22 103 kg (227 lb 1.2 oz)     Dosing Weight: 75 kg - adjusted body weight using most recent weight and IBW 67.3 kg    ASSESSED NUTRITION NEEDS  Estimated Energy Needs: 3230-4698 kcals/day (25 - 30 kcals/kg)  Justification: Maintenance  Estimated Protein Needs:  grams protein/day (1.2 - 1.5 grams of pro/kg)  Justification: Increased needs  Estimated Fluid Needs: 1 ml/kcal or per provider pending fluid status    MALNUTRITION  % Intake: < 75% for > 7 days (moderate)  % Weight Loss: None noted  Subcutaneous Fat Loss: None observed  Muscle Loss: Posterior calf:  Mild  Fluid Accumulation/Edema: Mild  Malnutrition Diagnosis: Moderate malnutrition in the context of acute on chronic illness    NUTRITION DIAGNOSIS  Inadequate oral intake related to nausea, decline in appetite as evidenced by previously documented intakes, pt previously requiring EN to meet needs.    INTERVENTIONS  Implementation  Nutrition education for nutrition relationship to health/disease   Medical food supplement therapy     Goals  Patient to consume % of nutritionally adequate meal trays TID, or the equivalent with supplements/snacks.     Monitoring/Evaluation  Progress toward goals will be monitored and evaluated per protocol.    Shaunna Piper MS, RDN, LDN  RD pager: [phone]  WB Weekend/Holiday Pager: [phone]

## 2023-06-26 NOTE — PROGRESS NOTES
"   06/26/23 0800   Appointment Info   Signing Clinician's Name / Credentials (PT) Diana Fall DPT   Rehab Comments (PT) PT: Eval complete, Txt initiated   Quick Adds   Quick Adds Certification      Language English   Living Environment   People in Home spouse   Current Living Arrangements house   Home Accessibility stairs within home   Number of Stairs, Within Home, Primary eight;other (see comments)   Stair Railings, Within Home, Primary railing on right side (ascending)   Transportation Anticipated family or friend will provide   Living Environment Comments PT: Pt lives in house in [place], [place] with wife. Ramp to enter with access to main level. Main level has kitchen and living items but no bathroom, has commode and is comfortable using at this level. Beds are current either 8 stairs up or 8 stairs down, but wife states \"We could move a bed down to the main level if we absolutely needed to.\"   Self-Care   Usual Activity Tolerance moderate   Current Activity Tolerance poor   Regular Exercise No   Equipment Currently Used at Home grab bar, tub/shower   Activity/Exercise/Self-Care Comment PT: Pt reports being sick for \"about a year.\" Wife reports that he could physically walk around the house but did have access to a seated wheeled walker in case of feelings of instability. Wife reports that \"toward the end\" she was assisting with pericares in the bathroom to prevent against any risk of infection or skin tears. Activity level is described as waxing and waning, some days could \"do very little.\" But household ambulation was consistent. When pt went to dialysis, pt's wife reports that he did not use wheelchair, would walk to and from car but used 4WW as a security if he needed to sit for a moment   General Information   Onset of Illness/Injury or Date of Surgery 06/05/23   Referring Physician Dr. Toni Sheth MD   Patient/Family Therapy Goals Statement (PT) To go home   Pertinent History of Current " "Problem (include personal factors and/or comorbidities that impact the POC) Taken Per Chart review: \"Damion Quinones is a 67 year old male with PMHx of decompensated alcohol + hemochromatosis (homozygous H63D) cirrhosis complicated by variceal bleeding s/p TIPS (10/1/2022) and hepatic encephalopathy + CKD (IgA nephropathy + ANCA vasculitis) s/p simultaneous liver kidney transplant on 6/6/2023. RTOR on 6/6/203 with concern for low flow in the renal graft - ex-lap, washout, closure with healthy appearing graft with intact arterial and venous flow.\"   Existing Precautions/Restrictions fall   General Observations PT: Pt is lying supine in bed with wife seated at bedside. Pt falls asleep half way through subjective conversation, wife is able to help fill in details   Cognition   Affect/Mental Status (Cognition) WFL   Orientation Status (Cognition) oriented x 4   Follows Commands (Cognition) WFL   Pain Assessment   Patient Currently in Pain Yes, see Vital Sign flowsheet   Integumentary/Edema   Integumentary/Edema   (Pt has ongoing gout in feet following surgery and reports some R low back pain, possibly kidney related)   Posture    Posture Forward head position   Range of Motion (ROM)   Range of Motion ROM is WFL   Strength (Manual Muscle Testing)   Strength Comments PT: Pt is grossly 3-/5 through B LE   Balance   Balance Comments PT: With increased time to adjust for orthostatic symptoms, pt is SBA for sitting EOB. Pt cannot stand without Mod A x 1, Max A x 1   Sensory Examination   Sensory Perception Comments PT: Intact B LE fine touch, burning, tingling symptoms reported in LE   Coordination   Coordination Comments PT: Unable to complete B heel/shin test due to weakness   Muscle Tone   Muscle Tone no deficits were identified   Clinical Impression   Criteria for Skilled Therapeutic Intervention Yes, treatment indicated   PT Diagnosis (PT) PT: Decreased endurance, strength, and poor stability with all activity "   Influenced by the following impairments PT: Medical Diagnosis/ Post transplant status   Functional limitations due to impairments Ambulation, transfers, out of bed activity   Clinical Presentation (PT Evaluation Complexity) Evolving/Changing   Clinical Presentation Rationale Age, PMH, Post transplant status   Clinical Decision Making (Complexity) moderate complexity   Planned Therapy Interventions (PT) balance training;bed mobility training;cryotherapy;E-stim;gait training;groups;home exercise program;joint mobilization;lumbar stabilization;manual therapy techniques;motor coordination training;neuromuscular re-education;orthotic fitting/training;patient/family education;postural re-education;prosthetic fitting/training;ROM (range of motion);stair training;strengthening;stretching;swiss ball techniques;TENS;thermotherapy;transfer training;progressive activity/exercise;risk factor education;wheelchair management/propulsion training;home program guidelines   Risk & Benefits of therapy have been explained evaluation/treatment results reviewed;care plan/treatment goals reviewed;risks/benefits reviewed;current/potential barriers reviewed;participants voiced agreement with care plan;participants included;patient;spouse/significant other   Clinical Impression Comments PT: Pt is a 68 y/o male who presents for PT following hospitalization for liver and R kidney transplant. PT evaluation revealed decreased endurance, strength, and stability significantly from reported baseline values requiring assist with all mobility. Pt will benift from skilled therapy in order address deficits, aide in safe discharge, and reduce pt's likelihood of rehospitalization and subsequent medical complications.   PT Total Evaluation Time   PT Rani Moderate Complexity Minutes (11626) 20   Therapy Certification   Start of care date 06/26/23   Certification date from 06/26/23   Certification date to 07/25/23   Medical Diagnosis S/P Liver and R  kidney transplant   Physical Therapy Goals   PT Frequency 6x/week   PT Predicted Duration/Target Date for Goal Attainment 07/17/23   PT Goals Transfers;Bed Mobility;Gait;Stairs;Aerobic Activity;PT Goal 1;PT Goal 2   PT: Bed Mobility Modified independent   PT: Transfers Modified independent;Sit to/from stand;Bed to/from chair;Assistive device   PT: Gait Supervision/stand-by assist;50 feet;Standard walker   PT: Stairs 8 stairs;Minimal assist   PT: Perform aerobic activity with stable cardiovascular response 10 minutes;NuStep   PT: Goal 1 PT; Pt will complete car transfer with CGA in order to safely discharge under care of wife and access home.   Interventions   Interventions Quick Adds Therapeutic Activity;Therapeutic Procedure   Therapeutic Procedure/Exercise   Ther. Procedure: strength, endurance, ROM, flexibillity Minutes (23674) 8   Treatment Detail/Skilled Intervention PT: Pt performed floor bike for 5 min with occ assist needed for support, once pt is esetablished with continuous rhythem, able to maintain. Time included for skilled set up and take down and therapeutic rest break   Therapeutic Activity   Therapeutic Activities: dynamic activities to improve functional performance Minutes (92102) 32   Treatment Detail/Skilled Intervention PT: Pt and wife oriented to unit and therapy schedule. Large amount of session was spent in room with functional mobility, see GG for furer   PT Discharge Planning   PT Plan PT: Pt is very deconditioned and weak. Will need A x 2 for stand pivots into wc, if unsure use lift. Standing frame vs // bars with extra assist. LE ex, foot bike vs NuStep   PT Discharge Recommendation (DC Rec) home with assist   PT Rationale for DC Rec PT: Pt lives in [place] with wife at baseline. Plan to return. Ramp to access main level, but has only commode and no bed. 8 stairs up to full bathroom and bed   PT Brief overview of current status PT: Lift with nursing. Max A x 2 for stand pivots into  "wc. No functional amb yet   Total Session Time   Timed Code Treatment Minutes 40   Total Session Time (sum of timed and untimed services) 60   Post Acute Settings Only   What unit is patient on? TCU   Bed Mobility: Turning side to side/Roll Left and Right   Patient Performance Partial/moderate assist \"includes weight bearing assist of trunk or limbs\"   Staff Performance One person assist (one person physical assist)   Describe Performance PT: Min A for complete rolling use of bed rail   Bed Mobility: Lying to sitting on the side of bed   Patient Performance Partial/moderate assist \"includes weight bearing assist of trunk or limbs\"   Staff Performance One person assist (one person physical assist)   Describe Performance PT: Mod A with increased time, use of bed controls. LE and trunk support and coordination   Transfers: Sit to Stand   Patient Performance Total dependence (helper does ALL of the effort)   Staff Performance Two +person assist (two plus persons physical assist)   Equipment Used Rolling walker   Describe Performance PT: max A x 1, Mod A x 1 for simple standing from elevated bed with FWW, pt struggles to appropriately coordinate task   Transfers: Chair/Bed transfers   Patient Performance Total dependence (helper does ALL of the effort)   Staff Performance Two +person assist (two plus persons physical assist)   Equipment Used Rolling walker   Describe Performance PT: Max A x 1, Mod A x 1 for simple standing from elevated bed with FWW. Pt is unable to safely coordinate task and hold on to walker. Removed the walker and was able to coordinate partial stand pivot with same assist levels. Premature sit and repositioning needed   Ambulation   Walks in room: Reason Not Done Medical condition   Walks in pierre:  Reason Not Done Medical condition   Walk 10 Feet   Reason Not Done Medical condition   Walk 10 Feet on uneven surfaces   Reason Not Done Medical condition   Walk 50 Feet with Two Turns   Reason Not Done " Medical condition   Walk 150 Feet   Reason Not Done Medical condition   Locomotion   Move on unit: Patient Performance Total dependence (helper does ALL of the effort)   Move on unit: Staff Performance One person assist (one person physical assist)   Move off unit: Patient Performance Total dependence (helper does ALL of the effort)   Move off unit: Staff Performance One person assist (one person physical assist)   Mobility device used manual wheelchair   Wheel 50 Feet   Patient Performance Total dependence (helper does ALL of the effort)   Mobility Device Used Manual   Wheel 150 Feet   Patient Performance Total dependence (helper does ALL of the effort)   Mobility Device Used Manual   1 Step (curb)   Reason Not Done Safety concerns   4 Steps   Reason Not Done Medical condition   12 Steps   Reason Not Done Medical condition   Car Transfer   Reason Not Done Medical condition   Picking up Object   Reason Not Done Safety concerns

## 2023-06-26 NOTE — PROGRESS NOTES
[facility]  OUTPATIENT OCCUPATIONAL THERAPY  EVALUATION  PLAN OF TREATMENT FOR OUTPATIENT REHABILITATION  (COMPLETE FOR INITIAL CLAIMS ONLY)  Patient's Last Name, First Name, M.I.  YOB: 1955  QuinonesDamion                          Provider's Name  [facility] Medical Record No.  [id number]                             Onset Date:  06/06/23   Start of Care Date:  06/26/23   Type:     ___PT   _X_OT   ___SLP Medical Diagnosis:  S/P Liver and R kidney transplant                    OT Diagnosis:  decreased safety/engagement in ADLs/IADLs Visits from SOC:  1     See note for plan of treatment, functional goals and certification details    I CERTIFY THE NEED FOR THESE SERVICES FURNISHED UNDER        THIS PLAN OF TREATMENT AND WHILE UNDER MY CARE     (Physician co-signature of this document indicates review and certification of the therapy plan).                       06/26/23 1314   Appointment Info   Signing Clinician's Name / Credentials (OT) Delma Rodriguez OTR/L   Quick Adds   Quick Adds Certification   Living Environment   People in Home spouse   Current Living Arrangements house   Home Accessibility stairs within home   Number of Stairs, Within Home, Primary eight;other (see comments)   Stair Railings, Within Home, Primary railing on right side (ascending)   Transportation Anticipated family or friend will provide   Living Environment Comments quad level house main level has kitchen, dining room, upstairs to get to bathroom with tub, down stairs to get to walk in shower, moveable grab bars. Has a 4WW.    Self-Care   Usual Activity Tolerance moderate   Current Activity Tolerance poor   Regular Exercise Yes   Activity/Exercise Type biking  (riding recumbent bike)   Exercise Amount/Frequency 3-5 times/wk   Equipment Currently Used at Home grab bar, tub/shower;walker, rolling   Fall history within last six months no    Activity/Exercise/Self-Care Comment Pt and wife report pt IND with ADLs at baseline. Wife occasionally assisting pending level of fatigue/medical status.   Instrumental Activities of Daily Living (IADL)   Previous Responsibilities meal prep;housekeeping;medication management;laundry;yardwork   IADL Comments Pt and wife report fluctuting levels of assist with medical status. Wife reports recently she has been completing most IADLs. Pt uses pillbox at baseline.   Post-Acute Assessment Only   Post-Acute Functional Assessment See below   Previous Level of Function/Home Environm   Bathing/Grooming, Premorbid Functional Level independent   Dressing, Premorbid Functional Level independent   Eating/Feeding, Premorbid Functional Level independent   Toileting, Premorbid Functional Level independent   BADLs, Premorbid Functional Level independent   IADLs, Premorbid Functional Level partial assistance   Bed Mobility, Premorbid Functional Level independent   Transfers, Premorbid Functional Level independent   Household Ambulation, Premorbid Functional Level independent   General Information   Onset of Illness/Injury or Date of Surgery 06/06/23   Referring Physician oTni Sheth MD   Patient/Family Therapy Goal Statement (OT) To get back to being IND   Additional Occupational Profile Info/Pertinent History of Current Problem Per H&P: 67 year old male admitted on 6/25/2023 for ongoing rehabilitation after hospitalization at [facility].  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  He underwent  simultaneous liver kidney transplant on 6/6/2023. RTOR on 6/6/203 with concern for low flow in the renal graft - ex-lap, washout, closure with healthy appearing graft with intact arterial and venous flow.  He is hospital course was also complicated  by renal failure requiring CRRT and intermittent hemodialysis, RIJ clot, atrial fibrillation, hypertension, fluid overload, hypoxemia, anemia, leukocytosis, malnutrition, steroids/tube feed induced hyperglycemia, gout, deconditioning.  For details of hospitalization please refer to the discharge summary note on 6/25/2023.   Existing Precautions/Restrictions immunosuppressed;lifting;fall   Limitations/Impairments safety/cognitive   Left Upper Extremity (Weight-bearing Status) partial weight-bearing (PWB)  (<10#s)   Right Upper Extremity (Weight-bearing Status) partial weight-bearing (PWB)  (<10#s)   Left Lower Extremity (Weight-bearing Status) full weight-bearing (FWB)   Right Lower Extremity (Weight-bearing Status) full weight-bearing (FWB)   General Observations and Info Pt falling asleep throughout subjective questions, wife assisting with PLOF information.   Cognitive Status Examination   Orientation Status orientation to person, place and time   Affect/Mental Status (Cognitive) anxious   Follows Commands follows two-step commands;75-90% accuracy   Safety Deficit insight into deficits/self-awareness;minimal deficit;problem-solving   Cognitive Status Comments Pt with difficulty remaining alert throughout subjective section of eval. Intermittant wordfinding difficulty noted. Will continue to assess.   Visual Perception   Visual Impairment/Limitations corrective lenses for reading   Sensory   Sensory Comments tingling in feet, pt reporting 2/2 onset of gout   Pain Assessment   Patient Currently in Pain Yes, see Vital Sign flowsheet  (pt reports gout pain in feet, hands, elbows, not rated)   Posture   Posture forward head position;protracted shoulders   Range of Motion Comprehensive   General Range of Motion bilateral upper extremity ROM WFL   Strength Comprehensive (MMT)   Comment, General Manual Muscle Testing (MMT) Assessment Not formally assessed however demonstrating significant generalized weakness    Coordination   Fine Motor Coordination Gout limiting FMC and finger/thumb opposition.   Balance   Balance Comments Ax2   Activities of Daily Living   BADL Assessment/Intervention   (See GG)   Clinical Impression   Criteria for Skilled Therapeutic Interventions Met (OT) Yes, treatment indicated   OT Diagnosis decreased safety/engagement in ADLs/IADLs   Influenced by the following impairments pain, decreased activity tolerance, decreased strength, decreased endurance, decreased mobility, impaired cognition   OT Problem List-Impairments impacting ADL problems related to;activity tolerance impaired;balance;cognition;mobility;range of motion (ROM);strength;pain;post-surgical precautions;postural control;sensation   Assessment of Occupational Performance 5 or more Performance Deficits   Identified Performance Deficits dressing, toileting, home mgmt, bathing, yardwork, med mgmt   Planned Therapy Interventions (OT) ADL retraining;IADL retraining;balance training;cognition;ROM;strengthening;transfer training;progressive activity/exercise;home program guidelines   Clinical Decision Making Complexity (OT) moderate complexity   Anticipated Equipment Needs Upon Discharge (OT)   (TBD)   Risk & Benefits of therapy have been explained evaluation/treatment results reviewed;care plan/treatment goals reviewed;risks/benefits reviewed;current/potential barriers reviewed;participants voiced agreement with care plan;participants included;patient;spouse/significant other   Clinical Impression Comments Pt will benefit from continued skilled OT serivces to progress IND and safety with ADLs/IADLs.   OT Total Evaluation Time   OT Eval, Moderate Complexity Minutes (84849) 20   Therapy Certification   Medical Diagnosis S/P Liver and R kidney transplant   Start of Care Date 06/26/23   Certification date from 06/26/23   Certification date to 07/25/23   OT Goals   Therapy Frequency (OT) 6 times/wk   OT Predicted Duration/Target Date for Goal  Attainment 07/17/23   OT Goals Hygiene/Grooming;Upper Body Dressing;Lower Body Dressing;Upper Body Bathing;Lower Body Bathing;Toilet Transfer/Toileting;Meal Preparation;Home Management;Cognition   OT: Hygiene/Grooming modified independent   OT: Upper Body Dressing Modified independent   OT: Lower Body Dressing Modified independent   OT: Upper Body Bathing Supervision/stand-by assist   OT: Lower Body Bathing Supervision/stand-by assist   OT: Toilet Transfer/Toileting Modified independent   OT: Meal Preparation Modified independent;with simple meal preparation   OT: Home Management Modified independent;with light demand household tasks   OT: Cognitive Patient/caregiver will verbalize understanding of cognitive assessment results/recommendations as needed for safe discharge planning   Interventions   Interventions Quick Adds Self-Care/Home Management   Self-Care/Home Management   Self-Care/Home Mgmt/ADL, Compensatory, Meal Prep Minutes (20614) 40   Symptoms Noted During/After Treatment (Meal Preparation/Planning Training) increased pain;dizziness;fatigue   Treatment Detail/Skilled Intervention Provided educated on role of OT, TCU, POC. Facilitated toileting seated on commode. Ax2 for boaz steady transfer with Max Ax2 for STS to commode. Unable to clear off commode so utilized OH lift to return to bed. Pt reqiuring increased time between activities 2/2 fatigue. Wife assisting with urinal placement while completing toileting. See GG.   OT Discharge Planning   OT Plan OT: abd precautions, fall, monitor cognition, Tx: sponge bathe seated EOB to progress activity tolerance, progress STS with boaz steady, monitor cognition   OT Discharge Recommendation (DC Rec) home with assist;home with home care occupational therapy   OT Rationale for DC Rec Pt is significantly deconditioned, has good support at home from wife. Will need to go up 8 stairs to get to bathroom.   OT Brief overview of current status OH lift with nursing    Total Session Time   Timed Code Treatment Minutes 40   Total Session Time (sum of timed and untimed services) 60   Post Acute Settings Only   What unit is patient on? TCU   Bed Mobility: Turning side to side/Roll Left and Right   Describe Performance OT: Min A rolling R/L   Bed Mobility: Lying to sitting on the side of bed   Describe Performance OT: Mod A with VC for sequencing, bedrail from flat   Transfers: Sit to Stand   Patient Performance Total dependence (helper does ALL of the effort)   Staff Performance Two +person assist (two plus persons physical assist)   Describe Performance OT: Max Ax2 with boaz steady from elevated bed height   Lower Body Dressing (Pants/Undergarments)   Patient Performance Total dependence (helper does ALL of the effort)   Describe Performance DEP Ax2 for donning, donning pants   Lower Body Dressing (Putting On/Taking-Off Footwear)   Patient Performance Total dependence (helper does ALL of the effort)   Staff Performance One person assist (one person physical assist)   Describe Performance DEP donning of socks   Toileting Hygiene   Patient Performance Total dependence (helper does ALL of the effort)   Staff Performance One person assist (one person physical assist)   Describe Performance DEP allison care   Transfers: Toilet transfers   Patient Performance Total dependence (helper does ALL of the effort)   Staff Performance Two +person assist (two plus persons physical assist)   Describe Performance Max Ax2 with Boaz steady to commode, OH lift back to bed   Hygiene/Grooming   Patient Performance Steadying assist   Oral Hygiene   Patient Performance Steadying assist   Describe Performance CGA seated EOB

## 2023-06-26 NOTE — PLAN OF CARE
Heparin drip currently running at 16.5 mL/hr. Heparin Anti-Xa (10a) level 0.40 , no changes made, next recheck scheduled at tomorrow(06/27/23) AM. BG's at 17:19 is 296, sliding scale insulin given as per orders. Pt refused to eat dinner, carb cover insulin not given. New heparin bag hanged at 1940. Pt is alert and oriented x 4. Able to make needs known. Denied chest pain, SOB, N/V. Denied pain and discomfort. On regular diet .Takes med's whole with thin liquids. ABD Incision intact with stables and EDUARDO. PIV on right upper arm , patent and dressing CDI. L PICC double lumen intact and dressing CDI. Uses urinal at bed side. Call light with in reach.Continue with plan of care.       Patient's most recent vital signs are:     Vital signs:  BP: 100/58  Temp: 98.2  HR: 95  RR: 17  SpO2: 95 %     Patient does not have new respiratory symptoms.  Patient does not have new sore throat.  Patient does not have a fever greater than 99.5.

## 2023-06-27 ENCOUNTER — APPOINTMENT (OUTPATIENT)
Dept: OCCUPATIONAL THERAPY | Facility: SKILLED NURSING FACILITY | Age: 68
End: 2023-06-27
Attending: INTERNAL MEDICINE
Payer: COMMERCIAL

## 2023-06-27 ENCOUNTER — TELEPHONE (OUTPATIENT)
Dept: TRANSPLANT | Facility: CLINIC | Age: 68
End: 2023-06-27
Payer: COMMERCIAL

## 2023-06-27 ENCOUNTER — APPOINTMENT (OUTPATIENT)
Dept: PHYSICAL THERAPY | Facility: SKILLED NURSING FACILITY | Age: 68
End: 2023-06-27
Attending: INTERNAL MEDICINE
Payer: COMMERCIAL

## 2023-06-27 DIAGNOSIS — Z94.0 KIDNEY REPLACED BY TRANSPLANT: Primary | ICD-10-CM

## 2023-06-27 DIAGNOSIS — Z48.298 AFTERCARE FOLLOWING ORGAN TRANSPLANT: ICD-10-CM

## 2023-06-27 DIAGNOSIS — Z79.899 ENCOUNTER FOR LONG-TERM CURRENT USE OF MEDICATION: ICD-10-CM

## 2023-06-27 LAB
ALBUMIN SERPL BCG-MCNC: 2.9 G/DL (ref 3.5–5.2)
ALP SERPL-CCNC: 362 U/L (ref 40–129)
ALT SERPL W P-5'-P-CCNC: 84 U/L (ref 0–70)
ANION GAP SERPL CALCULATED.3IONS-SCNC: 15 MMOL/L (ref 7–15)
AST SERPL W P-5'-P-CCNC: 33 U/L (ref 0–45)
BASOPHILS # BLD MANUAL: 0.1 10E3/UL (ref 0–0.2)
BASOPHILS NFR BLD MANUAL: 1 %
BILIRUB SERPL-MCNC: 0.9 MG/DL
BUN SERPL-MCNC: 83.4 MG/DL (ref 8–23)
CALCIUM SERPL-MCNC: 8.9 MG/DL (ref 8.8–10.2)
CHLORIDE SERPL-SCNC: 86 MMOL/L (ref 98–107)
CREAT SERPL-MCNC: 2.82 MG/DL (ref 0.67–1.17)
DEPRECATED HCO3 PLAS-SCNC: 31 MMOL/L (ref 22–29)
EOSINOPHIL # BLD MANUAL: 0 10E3/UL (ref 0–0.7)
EOSINOPHIL NFR BLD MANUAL: 0 %
ERYTHROCYTE [DISTWIDTH] IN BLOOD BY AUTOMATED COUNT: 18.5 % (ref 10–15)
GFR SERPL CREATININE-BSD FRML MDRD: 24 ML/MIN/1.73M2
GLUCOSE BLDC GLUCOMTR-MCNC: 138 MG/DL (ref 70–99)
GLUCOSE BLDC GLUCOMTR-MCNC: 245 MG/DL (ref 70–99)
GLUCOSE BLDC GLUCOMTR-MCNC: 249 MG/DL (ref 70–99)
GLUCOSE BLDC GLUCOMTR-MCNC: 286 MG/DL (ref 70–99)
GLUCOSE SERPL-MCNC: 146 MG/DL (ref 70–99)
HCT VFR BLD AUTO: 25 % (ref 40–53)
HGB BLD-MCNC: 7.8 G/DL (ref 13.3–17.7)
INR PPP: 1.22 (ref 0.85–1.15)
LYMPHOCYTES # BLD MANUAL: 0.1 10E3/UL (ref 0.8–5.3)
LYMPHOCYTES NFR BLD MANUAL: 1 %
MAGNESIUM SERPL-MCNC: 1.3 MG/DL (ref 1.7–2.3)
MAGNESIUM SERPL-MCNC: 1.8 MG/DL (ref 1.7–2.3)
MCH RBC QN AUTO: 29.8 PG (ref 26.5–33)
MCHC RBC AUTO-ENTMCNC: 31.2 G/DL (ref 31.5–36.5)
MCV RBC AUTO: 95 FL (ref 78–100)
METAMYELOCYTES # BLD MANUAL: 0.1 10E3/UL
METAMYELOCYTES NFR BLD MANUAL: 1 %
MONOCYTES # BLD MANUAL: 0.6 10E3/UL (ref 0–1.3)
MONOCYTES NFR BLD MANUAL: 5 %
MYELOCYTES # BLD MANUAL: 0.4 10E3/UL
MYELOCYTES NFR BLD MANUAL: 3 %
NEUTROPHILS # BLD MANUAL: 10.3 10E3/UL (ref 1.6–8.3)
NEUTROPHILS NFR BLD MANUAL: 86 %
PHOSPHATE SERPL-MCNC: 4.7 MG/DL (ref 2.5–4.5)
PLAT MORPH BLD: ABNORMAL
PLATELET # BLD AUTO: 446 10E3/UL (ref 150–450)
POTASSIUM SERPL-SCNC: 3.6 MMOL/L (ref 3.4–5.3)
PROMYELOCYTES # BLD MANUAL: 0.4 10E3/UL
PROMYELOCYTES NFR BLD MANUAL: 3 %
PROT SERPL-MCNC: 5.9 G/DL (ref 6.4–8.3)
RBC # BLD AUTO: 2.62 10E6/UL (ref 4.4–5.9)
RBC MORPH BLD: ABNORMAL
SODIUM SERPL-SCNC: 132 MMOL/L (ref 136–145)
TACROLIMUS BLD-MCNC: 5.7 UG/L (ref 5–15)
TME LAST DOSE: NORMAL H
TME LAST DOSE: NORMAL H
UFH PPP CHRO-ACNC: 0.17 IU/ML
UFH PPP CHRO-ACNC: 0.33 IU/ML
UFH PPP CHRO-ACNC: 0.55 IU/ML
WBC # BLD AUTO: 12 10E3/UL (ref 4–11)

## 2023-06-27 PROCEDURE — 85610 PROTHROMBIN TIME: CPT | Performed by: INTERNAL MEDICINE

## 2023-06-27 PROCEDURE — 80197 ASSAY OF TACROLIMUS: CPT | Performed by: INTERNAL MEDICINE

## 2023-06-27 PROCEDURE — 97530 THERAPEUTIC ACTIVITIES: CPT | Mod: GO

## 2023-06-27 PROCEDURE — 97110 THERAPEUTIC EXERCISES: CPT | Mod: GP

## 2023-06-27 PROCEDURE — 85027 COMPLETE CBC AUTOMATED: CPT | Performed by: INTERNAL MEDICINE

## 2023-06-27 PROCEDURE — 250N000012 HC RX MED GY IP 250 OP 636 PS 637: Performed by: INTERNAL MEDICINE

## 2023-06-27 PROCEDURE — 84100 ASSAY OF PHOSPHORUS: CPT | Performed by: INTERNAL MEDICINE

## 2023-06-27 PROCEDURE — 80053 COMPREHEN METABOLIC PANEL: CPT | Performed by: INTERNAL MEDICINE

## 2023-06-27 PROCEDURE — 83735 ASSAY OF MAGNESIUM: CPT | Performed by: INTERNAL MEDICINE

## 2023-06-27 PROCEDURE — 250N000013 HC RX MED GY IP 250 OP 250 PS 637: Performed by: INTERNAL MEDICINE

## 2023-06-27 PROCEDURE — 36592 COLLECT BLOOD FROM PICC: CPT | Performed by: INTERNAL MEDICINE

## 2023-06-27 PROCEDURE — 85520 HEPARIN ASSAY: CPT | Performed by: INTERNAL MEDICINE

## 2023-06-27 PROCEDURE — 120N000009 HC R&B SNF

## 2023-06-27 PROCEDURE — 85007 BL SMEAR W/DIFF WBC COUNT: CPT | Performed by: INTERNAL MEDICINE

## 2023-06-27 PROCEDURE — 97530 THERAPEUTIC ACTIVITIES: CPT | Mod: GP

## 2023-06-27 PROCEDURE — 97535 SELF CARE MNGMENT TRAINING: CPT | Mod: GO

## 2023-06-27 PROCEDURE — 250N000011 HC RX IP 250 OP 636: Mod: JZ | Performed by: INTERNAL MEDICINE

## 2023-06-27 RX ORDER — MAGNESIUM SULFATE HEPTAHYDRATE 40 MG/ML
2 INJECTION, SOLUTION INTRAVENOUS ONCE
Status: COMPLETED | OUTPATIENT
Start: 2023-06-27 | End: 2023-06-27

## 2023-06-27 RX ADMIN — WARFARIN SODIUM 3 MG: 2.5 TABLET ORAL at 17:48

## 2023-06-27 RX ADMIN — VALGANCICLOVIR 450 MG: 450 TABLET, FILM COATED ORAL at 21:06

## 2023-06-27 RX ADMIN — Medication 2.5 MG: at 21:12

## 2023-06-27 RX ADMIN — HEPARIN SODIUM 1450 UNITS/HR: 10000 INJECTION, SOLUTION INTRAVENOUS at 13:18

## 2023-06-27 RX ADMIN — NYSTATIN 500000 UNITS: 500000 SUSPENSION ORAL at 13:17

## 2023-06-27 RX ADMIN — URSODIOL 250 MG: 250 TABLET ORAL at 09:24

## 2023-06-27 RX ADMIN — PREDNISONE 20 MG: 20 TABLET ORAL at 08:01

## 2023-06-27 RX ADMIN — NYSTATIN 500000 UNITS: 500000 SUSPENSION ORAL at 21:06

## 2023-06-27 RX ADMIN — NYSTATIN 500000 UNITS: 500000 SUSPENSION ORAL at 17:48

## 2023-06-27 RX ADMIN — URSODIOL 250 MG: 250 TABLET ORAL at 21:06

## 2023-06-27 RX ADMIN — INSULIN ASPART 6 UNITS: 100 INJECTION, SOLUTION INTRAVENOUS; SUBCUTANEOUS at 17:47

## 2023-06-27 RX ADMIN — MAGNESIUM OXIDE TAB 400 MG (241.3 MG ELEMENTAL MG) 400 MG: 400 (241.3 MG) TAB at 11:58

## 2023-06-27 RX ADMIN — INSULIN ASPART 4 UNITS: 100 INJECTION, SOLUTION INTRAVENOUS; SUBCUTANEOUS at 12:24

## 2023-06-27 RX ADMIN — B-COMPLEX W/ C & FOLIC ACID TAB 1 MG 1 TABLET: 1 TAB at 11:57

## 2023-06-27 RX ADMIN — PANTOPRAZOLE SODIUM 40 MG: 40 TABLET, DELAYED RELEASE ORAL at 06:14

## 2023-06-27 RX ADMIN — ALLOPURINOL 100 MG: 100 TABLET ORAL at 08:03

## 2023-06-27 RX ADMIN — INSULIN ASPART 2 UNITS: 100 INJECTION, SOLUTION INTRAVENOUS; SUBCUTANEOUS at 21:07

## 2023-06-27 RX ADMIN — INSULIN ASPART 4 UNITS: 100 INJECTION, SOLUTION INTRAVENOUS; SUBCUTANEOUS at 19:18

## 2023-06-27 RX ADMIN — ATORVASTATIN CALCIUM 20 MG: 10 TABLET, FILM COATED ORAL at 21:06

## 2023-06-27 RX ADMIN — MYCOPHENOLATE MOFETIL 750 MG: 250 CAPSULE ORAL at 17:48

## 2023-06-27 RX ADMIN — BUMETANIDE 2 MG: 1 TABLET ORAL at 09:25

## 2023-06-27 RX ADMIN — MYCOPHENOLATE MOFETIL 750 MG: 250 CAPSULE ORAL at 08:04

## 2023-06-27 RX ADMIN — BUMETANIDE 2 MG: 1 TABLET ORAL at 21:06

## 2023-06-27 RX ADMIN — MAGNESIUM SULFATE HEPTAHYDRATE 2 G: 40 INJECTION, SOLUTION INTRAVENOUS at 10:56

## 2023-06-27 RX ADMIN — Medication 12.5 MG: at 21:06

## 2023-06-27 RX ADMIN — NYSTATIN 500000 UNITS: 500000 SUSPENSION ORAL at 09:25

## 2023-06-27 RX ADMIN — Medication 12.5 MG: at 06:14

## 2023-06-27 RX ADMIN — MAGNESIUM OXIDE TAB 400 MG (241.3 MG ELEMENTAL MG) 400 MG: 400 (241.3 MG) TAB at 21:06

## 2023-06-27 RX ADMIN — TACROLIMUS 2 MG: 1 CAPSULE ORAL at 17:48

## 2023-06-27 RX ADMIN — ASPIRIN 325 MG: 325 TABLET, FILM COATED ORAL at 08:04

## 2023-06-27 RX ADMIN — TACROLIMUS 2 MG: 1 CAPSULE ORAL at 08:03

## 2023-06-27 RX ADMIN — INSULIN ASPART 1 UNITS: 100 INJECTION, SOLUTION INTRAVENOUS; SUBCUTANEOUS at 08:13

## 2023-06-27 RX ADMIN — INSULIN ASPART 5 UNITS: 100 INJECTION, SOLUTION INTRAVENOUS; SUBCUTANEOUS at 12:23

## 2023-06-27 RX ADMIN — INSULIN ASPART 7 UNITS: 100 INJECTION, SOLUTION INTRAVENOUS; SUBCUTANEOUS at 08:13

## 2023-06-27 RX ADMIN — INSULIN GLARGINE 10 UNITS: 100 INJECTION, SOLUTION SUBCUTANEOUS at 21:09

## 2023-06-27 ASSESSMENT — ACTIVITIES OF DAILY LIVING (ADL)
ADLS_ACUITY_SCORE: 35

## 2023-06-27 NOTE — PROGRESS NOTES
"   06/27/23 1000   Name of Certified Therapeutic Rec Specialist   Name of Certified Therapeutic Rec Specialist RAY Montoya   Appointment Type   Type of Therapeutic Rec Session Therapeutic Rec Assessment   General Information   Patient Profile Review See Profile for full history and prior level of function   Daily Contact with Relatives or Friends Phone call;Visit   Community Involvement   Community Involvement Disabled;Retired   Spiritual Practice [facility] ?   (will request if needed)   Outings Dinner;Travel;Concerts   Hobbies/Interests   Cards Poker;Cribbage;Other (see comments)  (rummy, \"all kinds\")   Music   Music Preferences Rock   Media   Computer Has own at home;Will use tablet/phone   TV / Movies TV;Movie list   Sports / Physical Activities   Outdoor Activities Hunting;Fishing   Sports/Exercise Walks   Sports Fan Baseball;Football;Golf   Impression   Open to Socializing with Others Independent   Barriers to Leisure Mobility;Endurance   Patient, family and / or staff in agreement with Plan of Care Yes   Treatment Plan   Interested in Unit [place]? No   Type of Intervention Independent with activity   Equipment and Supplies While on Unit Radio   Assessment Assessment completed, pt's wife also present. Pt was oriented to role of rec therapy and provided with leisure resource list. Pt requested and was given radio. No other needs at this time. will monitor pt while on unit.       "

## 2023-06-27 NOTE — PROGRESS NOTES
SW was asked late yesterday to have pt resign End stage renal disease form.  SW took it this am and met with pt and wife/Apoorva.  He is doing well.  Pt signed form and SW sent it back to hospital SW.     JOSELIN Cornejo   [facility], Transitional Care Unit   Social Work   [facility] [address] [place] [address]  ) [phone]

## 2023-06-27 NOTE — PLAN OF CARE
TCU/ ARU care coordination assessment:    Admitting Dx:  SP liver and kidney transplant     Reason for consult: Transplant needs/ discharge planning.     Assessment completed with: Patient and Spouse, apoorva     Verified PCP:   Gary Serrano [phone] [address] [place] [id number]       Caregiver after discharge (yes or no):  Yes.      Name/ Relationship: Spouse, Apoorva.       Are they trainable for cares?  Yes.     Previous Home Services and DME:      Has not used HC in past. Per wife, she states they have tried to obtain HC for patient before and it has been difficult due to location.      Vascular Access device (yes or no): Yes, currently has dialysis line (internal jugular catheter) and PICC. Likely both will be removed prior to discharge. Provider to determine closer to discharge.     Anticipated Discharge Planning:      Anticipated discharge date: 7/18/23    Discharge Location: Home with spouse     Home Services: TBD: Likely need PT/OT/RN      DME/ supplies: will need transplant bag and Glucometer. Patient has been using oxygen PRN at [facility]. Will need to determine if this is due desaturations or for comfort. Patient states its for comfort at this time.     Education needs: Diabetic education, transplant pharmacy edu.     Other notes:      Patient has been on warfarin PTA. Does not need any education related to coumadin. Diabetic monitoring is new for patient and spouse they do not have any equipment or knowledge about monitoring glucose or adminstering insulin. Consult will need to be placed closer to discharge. Writer also reviewed process for home care with patient and spouse. Spouse expressed that there has been difficulties obtaining hc in patient's location in the past. Spouse would like to be a part of all education that is scheduled. Writer will include spouse contact information in consults. Introduced the names of both patient's post transplant coordinators. Writer will be available for ongoing  care coordination needs.

## 2023-06-27 NOTE — PLAN OF CARE
Goal Outcome Evaluation:    FOCUS/GOAL    Bowel management, Bladder management, Medication management, Wound care management, Mobility, Skin integrity and Safety management    ASSESSMENT, INTERVENTIONS AND CONTINUING PLAN FOR GOAL:    Pt is A&OX4, calm, & cooperative with care. Denied CP, SOB, & n/v. On 2L O2 via NC at night. A of 2 with Liko lift; was not OOB this shift. Continent for both B&B. Uses urinal for bladder & BSC for BM. Takes meds whole with thin liquid. R upper arm PIV patent & intact, IV heparin infusing without difficulty. L brachial vein double lumen PICC patent, NS locked, & dressing CDI. Heparin anti Xa level is 0.40 per 06/26/23 lab result & lab redraw is scheduled for 0600 am this morning. Pt slept well throughout the night & no acute issue. Able to make needs known & call light within reach. Will continue with plan of care.    Patient's most recent vital signs are:     Vital signs:  BP: 100/58  Temp: 98.2  HR: 95  RR: 17  SpO2: 95 %     Patient does not have new respiratory symptoms.  Patient does not have new sore throat.  Patient does not have a fever greater than 99.5.

## 2023-06-27 NOTE — PLAN OF CARE
Goal Outcome Evaluation:      Plan of Care Reviewed With: patient, spouse    Overall Patient Progress: improving    Outcome Evaluation: Pt with improving appetite and intakes. No longer on TF, encouraged continued adequate intakes and use of snacks/supplements as needed

## 2023-06-27 NOTE — TELEPHONE ENCOUNTER
Call to Kleber to clarify that appointment with Lorna Naik was not needed as Casey is still being followed by inpatient transplant surgery team while in rehab.

## 2023-06-27 NOTE — PLAN OF CARE
Goal Outcome Evaluation:        A&O x4. Sating 96% on RA. VSS. 2LPM applied via NC at night for NIKOS. Denies CP, SOB, N/V or pain this shift. Upper abdomen incision with staples intact. Right lower abdomen surgical incision intact except for small (about an inch) site w/ redness and small amount of bleeding- site cleaned, gauze and meplex placed. Meplex placed on elbows for protection. Meplex and vasaline gauze placed on L lower forearm skin tear. R arm PIV DCI. Continuous IV heparin running without difficulty, running at 16units/hr - RN managed with Low heparin intensity protocol. Recheck for next anti-Xa scheduled at 2100.     Right external jugular CVC x2 lumen DCI. LUE PICC x2 lumen dressing DCI and patent- flushed with NS. Transfers assist x2 with liko lift. Regular diet, ate with good appetite.  and 245. Sliding scale and carb coverage given. Mag recheck 1.8. Continent of B&B, uses urinal and BSC. LBM 6/26.  Results for Anti-Xa 0.33,  level in goal range. No bolus or change in rate required. Next anti-Xa level scheduled for 0300. Can make needs known. Continue POC.         Patient's most recent vital signs are:     Vital signs:  BP: 105/62  Temp: 97.9  HR: 93  RR: 18  SpO2: 96 %     Patient does not have new respiratory symptoms.  Patient does not have new sore throat.  Patient does not have a fever greater than 99.5.

## 2023-06-27 NOTE — CARE PLAN
RN: Metoprolol held 1400, BP 89/52, , asym, provider updated via electronic sticky note. Denies pain.  Wife here and very involved with cares. Surgical incisions EDUARDO. Continues on heparin drip, goal INR above 1.5, INR today 1.22. next unfractionated heparin anti 10 A level 1420,  Running at 14.5 ml/hour. Ceiling lift transfer.  Appointment cancelled today, reset for July 3.   New order magnesium rn managed, Mg  Level 1.3, 2 grams given IV via   PICC  Line and  Next mg level Check at  1445.   BG checks with sliding scale coverage and carb coverage for meals.       Patient's most recent vital signs are:     Vital signs:  BP: 89/52  Temp: 97.6  HR: 103  RR: 18  SpO2: 98 %     Patient does not have new respiratory symptoms.  Patient does not have new sore throat.  Patient does not have a fever greater than 99.5.

## 2023-06-27 NOTE — PROGRESS NOTES
Transplant Surgery Immunosuppression Management Note:  Damion Quinones is a 67 year old male with history of cirrhosis (alcohol + hemochromatosis), ESKD (IgA nephropathy + ANCA vasculitis), PAF, obesity, HTN, pre-diabetes, rheumatoid and psoriatic arthritis, and CAD. S/p simultaneous liver and kidney transplant on 6/6/2023 complicated by DGF, AF RVR, shock, gout flare, right internal jugular clot, and malnutrition.      POD # 21    Liver transplant w/ complex reconstruction of accessory right hepatic artery: LFTs trending down. . No biliary stent.    Kidney transplant w/ stent, delayed graft function:   Cr 3->2.8, last HD 6/19. UOP 1500. 6/20 biopsy: ATN, no rejection.     Immunosuppression:  Induction: Steroid taper and Thymoglobulin 400 mg (4 mg/kg).   Maintenance:   -  mg BID  - Tacrolimus goal level 5-8. Tac level 5.7, no change.  - Prednisone for rheumatoid and psoriatic arthritis and gout flare. Taper down to chronic dose of 10mg daily.  Infection prophylaxis: PJP (bactrim), viral (Valcyte x 12 weeks)    Transplant coordinator: Cindy Clay (Liver), Angelita Torres (Kidney) [phone]  Donor type: DBD  DSA at time of transplant:  Yes CW9 6/5/23  Ureteral stent: Yes  Biliary stent: No    Kim Poe [phone]

## 2023-06-27 NOTE — CARE PLAN
Rounded with patient and Diana (patient care supervisor) and spouse.  Discussed tentative discharge date of 7/18/23.  Plans to discharge home with spouse to [place].

## 2023-06-27 NOTE — CARE PLAN
Care Plan Created. Bedside RN to assess on progression updated and edit as needed.       Problem: Liver Transplant  Goal: Effective Organ Function  Description: Acknowledge unpredictable nature of the disease; assist with maintaining realistic hope.    Assess and monitor patient and caregiver perspective on quality of life and personal wellbeing; consider palliative care consult to enhance symptom relief, quality of life and advance care planning.    Assist with life transitions associated with liver failure (e.g., adherence to medical regimen, change in family dynamics or roles).    Support coping by recognizing current coping strategies; provide aid in developing new strategies.    Assess and monitor for signs and symptoms of anxiety and depression.    Screen for presence of alcohol misuse or addiction; provide brief intervention and referral for treatment.         Problem: Kidney Transplant  Goal: Optimal Coping with Organ Transplant  Description: Acknowledge, normalize, validate intensity and complexity of patient/support system response to surgery and posttransplant recovery.    Provide opportunity for expression of thoughts, feelings and concerns (e.g., guilt related to donor, fear of organ rejection).    Involve patient and support system in decision-making and care to enhance sense of control.    Acknowledge and validate significance of life-long lifestyle changes and expectations (e.g., adherence to medical regimens, health habits, financial impact).    Recognize current coping strategies and assist in developing new strategies (e.g., gratitude, optimism).    Assess and monitor for signs and symptoms of psychological distress, anxiety and depression.    Encourage support network development and posttransplant support group participation.           Problem: Malnutrition  Goal: Improved Nutritional Intake  Description: Perform a nutrition assessment; include a nutrition-focused physical exam.    Determine  calorie, protein, vitamin, mineral and fluid requirements.    Assess for micronutrient deficiencies; supplement if depleted.    Assess need and assist with meal set-up and feeding.    Adjust diet or meal schedule based on preferences and tolerance.    Minimize unnecessary dietary restrictions to increase oral intake.    Outcome: Progressing     Problem: Hyperglycemia  Goal: Blood Glucose Level Within Targeted Range  Description: Establish target blood glucose levels based on patient-specific factors, such as illness severity and comorbidity.    Document blood glucose levels and monitor trend; advocate for treatment if not within targeted range.    Provide pharmacologic therapy to maintain blood glucose levels within targeted range.    Advocate for insulin therapy if blood glucose level remains elevated.    Check blood glucose level if there is a change in mental or cognitive status.         Problem: Fall Injury Risk  Goal: Absence of Fall and Fall-Related Injury  Description: Provide a safe, barrier-free environment that encourages independent activity.    Keep care area uncluttered and well-lighted.    Determine need for increased observation or monitoring.    Avoid use of devices that minimize mobility, such as restraints or indwelling urinary catheter.           Problem: Infection  Goal: Absence of Infection Signs and Symptoms  Description: Implement transmission-based precautions and isolation, as indicated, to prevent spread of infection.    Obtain cultures prior to initiating antimicrobial therapy, when possible. Do not delay treatment for laboratory results in the presence of high suspicion or clinical indicators.    Administer ordered antimicrobial therapy promptly; reassess need regularly.    Monitor laboratory value, diagnostic test and clinical status trends for signs of infection progression.    Identify early signs of sepsis, such as increased heart rate and decreased blood pressure, as well as changes  in mental state, respiratory pattern or peripheral perfusion.           Problem: Pain Chronic (Persistent)  Goal: Optimal Pain Control and Function  Description: Evaluate pain level, effect of treatment and patient response at regular intervals.    Minimize pain stimuli; coordinate care and adjust environment (e.g., light, noise, unnecessary movement); promote sleep/rest.    Match pharmacologic analgesia to severity and type of pain mechanism (e.g., neuropathic, muscle, inflammatory); consider multimodal approach (e.g., nonopioid, opioid, adjuvant).    Provide medication at regular intervals; titrate to patient response.    Manage breakthrough pain with additional doses; consider rotation or switching medication.         Problem: BADL (Basic Activities of Daily Living) Impairment  Goal: Optimal Safe BADL Performance  Description: Assess BADL (basic activity of daily living) abilities; encourage participation at maximally safe independent level.    Provide assistance and supervision needed to maintain safety; involve caregiver in BADL (basic activity of daily living) training.    Ensure effective use of equipment or devices, such as a long-handled reacher, shower seat or orthosis.    Ensure proper body mechanics and positioning for optimal task performance.    Provide set-up of items if patient is unable to retrieve; store personal care items in accessible location.    Schedule BADL (basic activity of daily living) activities when pain and fatigue are at a minimum; pace activity to conserve energy.

## 2023-06-27 NOTE — DISCHARGE INSTRUCTIONS
"Primary Care Provider  You are scheduled to see Dr. Serrano on 8/11/2023 at 2:30pm.    Address [address] [facility] [address]     Phone   [phone]    Transplant Coordinators:   Cindy Clay- Liver Coordinator  Angelita Torres- Kidney Coordinator    Contact the transplant team at [phone]. Ask for RN or nurse covering.   If after hours (before 8 AM or after 5 PM), weekends or holidays call [phone] and ask for the on-call liver/kidney Coordinator.      (Please send 2 Aquacel dressings home with pt)  Right abdomen wound(s): Daily   Cleanse with wound cleanser, pat dry.   Cut Aquacel Ag to slightly wider than wound bed.  Moisten Aquacel and fold into wound bed.  Cover with mepilex. (Home: ok to use large band aid or dry gauze and tape)  Intact scabs: leave in place. OK to shower. May cleanse with wound cleanser and pat dry, leave open to air. If loosen, ok to trim if needed but do not force or \"pick\" at scab. If scabs come off and wound is open, use above wound care until healed.    "

## 2023-06-27 NOTE — PLAN OF CARE
Goal Outcome Evaluation:      Plan of Care Reviewed With: patient    Overall Patient Progress: improvingOverall Patient Progress: improving        A+O x4, pleasant and cooperative with care. Denies chest pain, SOB and n/v, afebrile, VSS on room air. Continent for both B&B. Good appetite. Spent time in chair today, went outside in wheelchair. No pain reported. IV heparin infusing by R upper arm PIV, with anti Xa level 0.55 per morning lab draw. L PICC patent, NS locked, and dressing CDI. IV magnesium replacement ordered & administered on this shift for mag level of 1.3. Continue with plan of care.     Vitals:   BP (!) 89/52 (BP Location: Left arm)   Pulse 103   Temp 97.6  F (36.4  C) (Oral)   Resp 18   Wt 96 kg (211 lb 10.3 oz)   SpO2 98%   BMI 33.15 kg/m

## 2023-06-27 NOTE — TELEPHONE ENCOUNTER
[facility] Call Center    Phone Message    May a detailed message be left on voicemail: no     Reason for Call: Other: Kleber from [facility] calling about cancelled/rescheduled appointments for patient.  She asked why appointment was cancelled for today and if it needs to be rescheduled - they would need to arrange transportation for patient.  Kleber is requesting a call back from [facility] to make sure appointments are coordinated correctly.  Call back # [phone] - ask for Kleebr or Angelita Tinajero.  Thank you!     Action Taken: Message routed to:  [facility] ([facility] FRED WILDE    Travel Screening: Not Applicable

## 2023-06-28 ENCOUNTER — APPOINTMENT (OUTPATIENT)
Dept: PHYSICAL THERAPY | Facility: SKILLED NURSING FACILITY | Age: 68
End: 2023-06-28
Attending: INTERNAL MEDICINE
Payer: COMMERCIAL

## 2023-06-28 ENCOUNTER — APPOINTMENT (OUTPATIENT)
Dept: OCCUPATIONAL THERAPY | Facility: SKILLED NURSING FACILITY | Age: 68
End: 2023-06-28
Attending: INTERNAL MEDICINE
Payer: COMMERCIAL

## 2023-06-28 LAB
ALBUMIN SERPL BCG-MCNC: 2.8 G/DL (ref 3.5–5.2)
ALP SERPL-CCNC: 338 U/L (ref 40–129)
ALT SERPL W P-5'-P-CCNC: 73 U/L (ref 0–70)
ANION GAP SERPL CALCULATED.3IONS-SCNC: 15 MMOL/L (ref 7–15)
AST SERPL W P-5'-P-CCNC: 29 U/L (ref 0–45)
BASO STIPL BLD QL SMEAR: PRESENT
BASOPHILS # BLD MANUAL: 0.1 10E3/UL (ref 0–0.2)
BASOPHILS NFR BLD MANUAL: 1 %
BILIRUB SERPL-MCNC: 0.7 MG/DL
BUN SERPL-MCNC: 81.9 MG/DL (ref 8–23)
CALCIUM SERPL-MCNC: 9.1 MG/DL (ref 8.8–10.2)
CHLORIDE SERPL-SCNC: 90 MMOL/L (ref 98–107)
CREAT SERPL-MCNC: 2.56 MG/DL (ref 0.67–1.17)
CRP SERPL-MCNC: 83.22 MG/L
DEPRECATED HCO3 PLAS-SCNC: 28 MMOL/L (ref 22–29)
EOSINOPHIL # BLD MANUAL: 0.1 10E3/UL (ref 0–0.7)
EOSINOPHIL NFR BLD MANUAL: 1 %
ERYTHROCYTE [DISTWIDTH] IN BLOOD BY AUTOMATED COUNT: 18.3 % (ref 10–15)
FRAGMENTS BLD QL SMEAR: SLIGHT
GFR SERPL CREATININE-BSD FRML MDRD: 27 ML/MIN/1.73M2
GLUCOSE BLDC GLUCOMTR-MCNC: 140 MG/DL (ref 70–99)
GLUCOSE BLDC GLUCOMTR-MCNC: 161 MG/DL (ref 70–99)
GLUCOSE BLDC GLUCOMTR-MCNC: 241 MG/DL (ref 70–99)
GLUCOSE BLDC GLUCOMTR-MCNC: 348 MG/DL (ref 70–99)
GLUCOSE SERPL-MCNC: 130 MG/DL (ref 70–99)
HCT VFR BLD AUTO: 23.4 % (ref 40–53)
HGB BLD-MCNC: 7.5 G/DL (ref 13.3–17.7)
INR PPP: 1.36 (ref 0.85–1.15)
LYMPHOCYTES # BLD MANUAL: 0.5 10E3/UL (ref 0.8–5.3)
LYMPHOCYTES NFR BLD MANUAL: 4 %
MAGNESIUM SERPL-MCNC: 1.7 MG/DL (ref 1.7–2.3)
MCH RBC QN AUTO: 30.7 PG (ref 26.5–33)
MCHC RBC AUTO-ENTMCNC: 32.1 G/DL (ref 31.5–36.5)
MCV RBC AUTO: 96 FL (ref 78–100)
METAMYELOCYTES # BLD MANUAL: 0.4 10E3/UL
METAMYELOCYTES NFR BLD MANUAL: 3 %
MONOCYTES # BLD MANUAL: 0.9 10E3/UL (ref 0–1.3)
MONOCYTES NFR BLD MANUAL: 7 %
MYELOCYTES # BLD MANUAL: 0.6 10E3/UL
MYELOCYTES NFR BLD MANUAL: 5 %
NEUTROPHILS # BLD MANUAL: 9.3 10E3/UL (ref 1.6–8.3)
NEUTROPHILS NFR BLD MANUAL: 76 %
NRBC # BLD AUTO: 0.2 10E3/UL
NRBC BLD MANUAL-RTO: 2 %
PHOSPHATE SERPL-MCNC: 3.8 MG/DL (ref 2.5–4.5)
PLAT MORPH BLD: ABNORMAL
PLATELET # BLD AUTO: 436 10E3/UL (ref 150–450)
POLYCHROMASIA BLD QL SMEAR: SLIGHT
POTASSIUM SERPL-SCNC: 3.6 MMOL/L (ref 3.4–5.3)
PROMYELOCYTES # BLD MANUAL: 0.4 10E3/UL
PROMYELOCYTES NFR BLD MANUAL: 3 %
PROT SERPL-MCNC: 5.6 G/DL (ref 6.4–8.3)
RBC # BLD AUTO: 2.44 10E6/UL (ref 4.4–5.9)
RBC MORPH BLD: ABNORMAL
SODIUM SERPL-SCNC: 133 MMOL/L (ref 136–145)
TACROLIMUS BLD-MCNC: 5.4 UG/L (ref 5–15)
TME LAST DOSE: NORMAL H
TME LAST DOSE: NORMAL H
UFH PPP CHRO-ACNC: 0.26 IU/ML
UFH PPP CHRO-ACNC: 0.35 IU/ML
UFH PPP CHRO-ACNC: 0.38 IU/ML
UFH PPP CHRO-ACNC: 0.44 IU/ML
WBC # BLD AUTO: 12.2 10E3/UL (ref 4–11)

## 2023-06-28 PROCEDURE — 250N000013 HC RX MED GY IP 250 OP 250 PS 637: Performed by: INTERNAL MEDICINE

## 2023-06-28 PROCEDURE — 250N000012 HC RX MED GY IP 250 OP 636 PS 637: Performed by: INTERNAL MEDICINE

## 2023-06-28 PROCEDURE — 120N000009 HC R&B SNF

## 2023-06-28 PROCEDURE — 85027 COMPLETE CBC AUTOMATED: CPT | Performed by: INTERNAL MEDICINE

## 2023-06-28 PROCEDURE — 36592 COLLECT BLOOD FROM PICC: CPT | Performed by: INTERNAL MEDICINE

## 2023-06-28 PROCEDURE — 85610 PROTHROMBIN TIME: CPT | Performed by: INTERNAL MEDICINE

## 2023-06-28 PROCEDURE — 85007 BL SMEAR W/DIFF WBC COUNT: CPT | Performed by: INTERNAL MEDICINE

## 2023-06-28 PROCEDURE — 36415 COLL VENOUS BLD VENIPUNCTURE: CPT | Performed by: INTERNAL MEDICINE

## 2023-06-28 PROCEDURE — 97110 THERAPEUTIC EXERCISES: CPT | Mod: GP | Performed by: PHYSICAL THERAPIST

## 2023-06-28 PROCEDURE — 97530 THERAPEUTIC ACTIVITIES: CPT | Mod: GP | Performed by: PHYSICAL THERAPIST

## 2023-06-28 PROCEDURE — 84100 ASSAY OF PHOSPHORUS: CPT | Performed by: INTERNAL MEDICINE

## 2023-06-28 PROCEDURE — 97530 THERAPEUTIC ACTIVITIES: CPT | Mod: GO

## 2023-06-28 PROCEDURE — 86140 C-REACTIVE PROTEIN: CPT | Performed by: INTERNAL MEDICINE

## 2023-06-28 PROCEDURE — 85520 HEPARIN ASSAY: CPT | Performed by: INTERNAL MEDICINE

## 2023-06-28 PROCEDURE — 250N000011 HC RX IP 250 OP 636: Mod: JZ | Performed by: INTERNAL MEDICINE

## 2023-06-28 PROCEDURE — 250N000011 HC RX IP 250 OP 636: Performed by: INTERNAL MEDICINE

## 2023-06-28 PROCEDURE — 80197 ASSAY OF TACROLIMUS: CPT | Performed by: INTERNAL MEDICINE

## 2023-06-28 PROCEDURE — 80053 COMPREHEN METABOLIC PANEL: CPT | Performed by: INTERNAL MEDICINE

## 2023-06-28 PROCEDURE — 83735 ASSAY OF MAGNESIUM: CPT | Performed by: INTERNAL MEDICINE

## 2023-06-28 RX ORDER — GABAPENTIN 100 MG/1
100 CAPSULE ORAL 3 TIMES DAILY
Status: DISCONTINUED | OUTPATIENT
Start: 2023-06-28 | End: 2023-07-03

## 2023-06-28 RX ORDER — HEPARIN SODIUM,PORCINE 10 UNIT/ML
5-20 VIAL (ML) INTRAVENOUS
Status: DISCONTINUED | OUTPATIENT
Start: 2023-06-28 | End: 2023-07-08

## 2023-06-28 RX ORDER — ACETAMINOPHEN 650 MG/1
650 SUPPOSITORY RECTAL EVERY 6 HOURS PRN
Status: DISCONTINUED | OUTPATIENT
Start: 2023-06-28 | End: 2023-07-12

## 2023-06-28 RX ORDER — HEPARIN SODIUM,PORCINE 10 UNIT/ML
5-20 VIAL (ML) INTRAVENOUS EVERY 24 HOURS
Status: DISCONTINUED | OUTPATIENT
Start: 2023-06-28 | End: 2023-07-08

## 2023-06-28 RX ADMIN — HEPARIN, PORCINE (PF) 10 UNIT/ML INTRAVENOUS SYRINGE 5 ML: at 09:59

## 2023-06-28 RX ADMIN — INSULIN ASPART 2 UNITS: 100 INJECTION, SOLUTION INTRAVENOUS; SUBCUTANEOUS at 18:03

## 2023-06-28 RX ADMIN — GABAPENTIN 100 MG: 100 CAPSULE ORAL at 21:59

## 2023-06-28 RX ADMIN — INSULIN ASPART 5 UNITS: 100 INJECTION, SOLUTION INTRAVENOUS; SUBCUTANEOUS at 09:17

## 2023-06-28 RX ADMIN — MAGNESIUM OXIDE TAB 400 MG (241.3 MG ELEMENTAL MG) 400 MG: 400 (241.3 MG) TAB at 21:58

## 2023-06-28 RX ADMIN — NYSTATIN 500000 UNITS: 500000 SUSPENSION ORAL at 12:29

## 2023-06-28 RX ADMIN — Medication 12.5 MG: at 22:05

## 2023-06-28 RX ADMIN — ASPIRIN 325 MG: 325 TABLET, FILM COATED ORAL at 07:54

## 2023-06-28 RX ADMIN — TACROLIMUS 2 MG: 1 CAPSULE ORAL at 07:54

## 2023-06-28 RX ADMIN — INSULIN ASPART 2 UNITS: 100 INJECTION, SOLUTION INTRAVENOUS; SUBCUTANEOUS at 22:09

## 2023-06-28 RX ADMIN — WARFARIN SODIUM 3 MG: 2.5 TABLET ORAL at 18:05

## 2023-06-28 RX ADMIN — NYSTATIN 500000 UNITS: 500000 SUSPENSION ORAL at 09:54

## 2023-06-28 RX ADMIN — ATORVASTATIN CALCIUM 20 MG: 10 TABLET, FILM COATED ORAL at 21:59

## 2023-06-28 RX ADMIN — HEPARIN, PORCINE (PF) 10 UNIT/ML INTRAVENOUS SYRINGE 5 ML: at 10:26

## 2023-06-28 RX ADMIN — MYCOPHENOLATE MOFETIL 750 MG: 250 CAPSULE ORAL at 18:06

## 2023-06-28 RX ADMIN — INSULIN ASPART 1 UNITS: 100 INJECTION, SOLUTION INTRAVENOUS; SUBCUTANEOUS at 11:59

## 2023-06-28 RX ADMIN — TACROLIMUS 2 MG: 1 CAPSULE ORAL at 18:06

## 2023-06-28 RX ADMIN — NYSTATIN 500000 UNITS: 500000 SUSPENSION ORAL at 18:05

## 2023-06-28 RX ADMIN — INSULIN ASPART 1 UNITS: 100 INJECTION, SOLUTION INTRAVENOUS; SUBCUTANEOUS at 12:25

## 2023-06-28 RX ADMIN — Medication 12.5 MG: at 06:12

## 2023-06-28 RX ADMIN — INSULIN ASPART 1 UNITS: 100 INJECTION, SOLUTION INTRAVENOUS; SUBCUTANEOUS at 07:57

## 2023-06-28 RX ADMIN — MYCOPHENOLATE MOFETIL 750 MG: 250 CAPSULE ORAL at 07:54

## 2023-06-28 RX ADMIN — MAGNESIUM OXIDE TAB 400 MG (241.3 MG ELEMENTAL MG) 400 MG: 400 (241.3 MG) TAB at 11:59

## 2023-06-28 RX ADMIN — ALLOPURINOL 100 MG: 100 TABLET ORAL at 07:54

## 2023-06-28 RX ADMIN — BUMETANIDE 2 MG: 1 TABLET ORAL at 09:53

## 2023-06-28 RX ADMIN — URSODIOL 250 MG: 250 TABLET ORAL at 22:00

## 2023-06-28 RX ADMIN — PANTOPRAZOLE SODIUM 40 MG: 40 TABLET, DELAYED RELEASE ORAL at 06:13

## 2023-06-28 RX ADMIN — BUMETANIDE 2 MG: 1 TABLET ORAL at 21:59

## 2023-06-28 RX ADMIN — INSULIN GLARGINE 10 UNITS: 100 INJECTION, SOLUTION SUBCUTANEOUS at 22:11

## 2023-06-28 RX ADMIN — GABAPENTIN 100 MG: 100 CAPSULE ORAL at 14:36

## 2023-06-28 RX ADMIN — HEPARIN SODIUM 1600 UNITS/HR: 10000 INJECTION, SOLUTION INTRAVENOUS at 03:29

## 2023-06-28 RX ADMIN — HEPARIN SODIUM 1600 UNITS/HR: 10000 INJECTION, SOLUTION INTRAVENOUS at 11:16

## 2023-06-28 RX ADMIN — INSULIN ASPART 9 UNITS: 100 INJECTION, SOLUTION INTRAVENOUS; SUBCUTANEOUS at 17:58

## 2023-06-28 RX ADMIN — SULFAMETHOXAZOLE AND TRIMETHOPRIM 1 TABLET: 400; 80 TABLET ORAL at 09:53

## 2023-06-28 RX ADMIN — B-COMPLEX W/ C & FOLIC ACID TAB 1 MG 1 TABLET: 1 TAB at 11:59

## 2023-06-28 RX ADMIN — NYSTATIN 500000 UNITS: 500000 SUSPENSION ORAL at 22:14

## 2023-06-28 RX ADMIN — PREDNISONE 20 MG: 20 TABLET ORAL at 07:55

## 2023-06-28 RX ADMIN — URSODIOL 250 MG: 250 TABLET ORAL at 09:52

## 2023-06-28 ASSESSMENT — ACTIVITIES OF DAILY LIVING (ADL)
ADLS_ACUITY_SCORE: 35

## 2023-06-28 NOTE — PLAN OF CARE
Goal Outcome Evaluation:     Pt has 0300 Anti-Xa level draw per Heparin Infusion Adult Adjustment by RN Protocol. Received a call from lab asking if the other AM lab order can be drawn at 0300 which RN said YES except for Tacrolimus which needs to be drawn 12H from the last dose which was 5:50 pm. Lab personnel  acknowledged.

## 2023-06-28 NOTE — CARE PLAN
RN: Lab mahendra unfractionated Hep 10 A level early, no changes to heparin drip until 1000 lab result in.  No change in rate for heparin drip today, next draw 1815 today. No Mg replacement needed today. Denies need for pain meds. Neurontin started today for pt complaint of peripheral neuropathy. States numbness and tingling and some pain in both feet. Pt wife here to visit and involved in plan of care.     Patient's most recent vital signs are:     Vital signs:  BP: 99/59  Temp: 98.2  HR: 116  RR: 18  SpO2: 97 %     Patient does not have new respiratory symptoms.  Patient does not have new sore throat.  Patient does not have a fever greater than 99.5.

## 2023-06-28 NOTE — PROGRESS NOTES
Patient seen and evaluated for BL feet pain- ongoing. Patient already on prednisone taper for gout flare. Noted mild erythema (not new per patient), L foot dorsum. Edema ble L>R. Diffuse burning both feet and mild ttp. Ongoing for ~1 and 1/2 week.     dw Transplant team.   ? Neuropathy   Will try gabapentin 100 mg tid, renal dosing.   Ct to monitor.   Add crp to lab- follow-up Q 48h  Pt updated.     Vitals:    06/28/23 0611 06/28/23 0612 06/28/23 0741 06/28/23 1433   BP: 110/68  109/71 99/59   BP Location: Left arm  Left arm Left arm   Patient Position:   Sitting Sitting   Cuff Size:    Adult Regular   Pulse:  100 91 116   Resp:       Temp:   98.2  F (36.8  C)    TempSrc:   Oral    SpO2:   97%    Weight:             CMPRecent Labs   Lab 06/28/23  1159 06/28/23  0740 06/28/23  0644 06/27/23  2106 06/27/23  1733 06/27/23  1416 06/27/23  0729 06/27/23  0651 06/25/23  0816 06/25/23  0516 06/24/23  0833 06/24/23  0731   NA  --   --  133*  --   --   --   --  132*  --  134*  --  132*   POTASSIUM  --   --  3.6  --   --   --   --  3.6  --  3.9  --  3.9   CHLORIDE  --   --  90*  --   --   --   --  86*  --  88*  --  88*   CO2  --   --  28  --   --   --   --  31*  --  29  --  27   ANIONGAP  --   --  15  --   --   --   --  15  --  17*  --  17*   * 140* 130* 245*   < >  --    < > 146*   < > 131*   < > 122*   BUN  --   --  81.9*  --   --   --   --  83.4*  --  89.2*  --  83.6*   CR  --   --  2.56*  --   --   --   --  2.82*  --  2.98*  --  3.07*   GFRESTIMATED  --   --  27*  --   --   --   --  24*  --  23*  --  22*   NAZARIO  --   --  9.1  --   --   --   --  8.9  --  9.2  --  9.0   MAG  --   --  1.7  --   --  1.8  --  1.3*  --  1.5*  --  1.5*   PHOS  --   --  3.8  --   --   --   --  4.7*  --  3.8  --  4.0   PROTTOTAL  --   --  5.6*  --   --   --   --  5.9*  --  6.1*  --  5.9*   ALBUMIN  --   --  2.8*  --   --   --   --  2.9*  --  3.0*  --  2.7*   BILITOTAL  --   --  0.7  --   --   --   --  0.9  --  0.9  --  1.0   ALKPHOS  --   --   338*  --   --   --   --  362*  --  429*  --  393*   AST  --   --  29  --   --   --   --  33  --  58*  --  61*   ALT  --   --  73*  --   --   --   --  84*  --  118*  --  100*    < > = values in this interval not displayed.     CBC  Recent Labs   Lab 06/28/23  0644 06/27/23  0651 06/25/23  1620 06/25/23  0516   WBC 12.2* 12.0* 11.6* 10.6   RBC 2.44* 2.62* 2.48* 2.53*   HGB 7.5* 7.8* 7.5* 7.6*   HCT 23.4* 25.0* 23.5* 24.6*   MCV 96 95 95 97   MCH 30.7 29.8 30.2 30.0   MCHC 32.1 31.2* 31.9 30.9*   RDW 18.3* 18.5* 18.7* 18.9*    446 374 363     INR  Recent Labs   Lab 06/28/23  0644 06/27/23  0651 06/26/23  0205 06/25/23  0516   INR 1.36* 1.22* 1.10 1.09     Arterial Blood GasNo lab results found in last 7 days.  Kishan Mcneill MD       [facility]  Contact information available via [facility] Paging/Directory

## 2023-06-29 ENCOUNTER — APPOINTMENT (OUTPATIENT)
Dept: OCCUPATIONAL THERAPY | Facility: SKILLED NURSING FACILITY | Age: 68
End: 2023-06-29
Attending: INTERNAL MEDICINE
Payer: COMMERCIAL

## 2023-06-29 ENCOUNTER — APPOINTMENT (OUTPATIENT)
Dept: PHYSICAL THERAPY | Facility: SKILLED NURSING FACILITY | Age: 68
End: 2023-06-29
Attending: INTERNAL MEDICINE
Payer: COMMERCIAL

## 2023-06-29 LAB
BASOPHILS # BLD MANUAL: 0 10E3/UL (ref 0–0.2)
BASOPHILS NFR BLD MANUAL: 0 %
EOSINOPHIL # BLD MANUAL: 0.5 10E3/UL (ref 0–0.7)
EOSINOPHIL NFR BLD MANUAL: 3 %
ERYTHROCYTE [DISTWIDTH] IN BLOOD BY AUTOMATED COUNT: 18.4 % (ref 10–15)
GLUCOSE BLDC GLUCOMTR-MCNC: 132 MG/DL (ref 70–99)
GLUCOSE BLDC GLUCOMTR-MCNC: 175 MG/DL (ref 70–99)
GLUCOSE BLDC GLUCOMTR-MCNC: 231 MG/DL (ref 70–99)
GLUCOSE BLDC GLUCOMTR-MCNC: 326 MG/DL (ref 70–99)
GLUCOSE BLDC GLUCOMTR-MCNC: 365 MG/DL (ref 70–99)
GLUCOSE BLDC GLUCOMTR-MCNC: 385 MG/DL (ref 70–99)
HCT VFR BLD AUTO: 24.8 % (ref 40–53)
HGB BLD-MCNC: 7.9 G/DL (ref 13.3–17.7)
HOLD SPECIMEN: NORMAL
INR PPP: 1.47 (ref 0.85–1.15)
LYMPHOCYTES # BLD MANUAL: 0.5 10E3/UL (ref 0.8–5.3)
LYMPHOCYTES NFR BLD MANUAL: 3 %
MAGNESIUM SERPL-MCNC: 1.5 MG/DL (ref 1.7–2.3)
MAGNESIUM SERPL-MCNC: 3 MG/DL (ref 1.7–2.3)
MCH RBC QN AUTO: 31.1 PG (ref 26.5–33)
MCHC RBC AUTO-ENTMCNC: 31.9 G/DL (ref 31.5–36.5)
MCV RBC AUTO: 98 FL (ref 78–100)
METAMYELOCYTES # BLD MANUAL: 0.7 10E3/UL
METAMYELOCYTES NFR BLD MANUAL: 4 %
MONOCYTES # BLD MANUAL: 0 10E3/UL (ref 0–1.3)
MONOCYTES NFR BLD MANUAL: 0 %
MYELOCYTES # BLD MANUAL: 0.5 10E3/UL
MYELOCYTES NFR BLD MANUAL: 3 %
NEUTROPHILS # BLD MANUAL: 14.4 10E3/UL (ref 1.6–8.3)
NEUTROPHILS NFR BLD MANUAL: 84 %
PLAT MORPH BLD: ABNORMAL
PLATELET # BLD AUTO: 521 10E3/UL (ref 150–450)
PROMYELOCYTES # BLD MANUAL: 0.5 10E3/UL
PROMYELOCYTES NFR BLD MANUAL: 3 %
RBC # BLD AUTO: 2.54 10E6/UL (ref 4.4–5.9)
RBC MORPH BLD: ABNORMAL
UFH PPP CHRO-ACNC: 0.27 IU/ML
UFH PPP CHRO-ACNC: 0.56 IU/ML
WBC # BLD AUTO: 17.1 10E3/UL (ref 4–11)

## 2023-06-29 PROCEDURE — 97530 THERAPEUTIC ACTIVITIES: CPT | Mod: GP | Performed by: REHABILITATION PRACTITIONER

## 2023-06-29 PROCEDURE — 250N000011 HC RX IP 250 OP 636: Mod: JZ | Performed by: INTERNAL MEDICINE

## 2023-06-29 PROCEDURE — 85520 HEPARIN ASSAY: CPT | Performed by: INTERNAL MEDICINE

## 2023-06-29 PROCEDURE — 97110 THERAPEUTIC EXERCISES: CPT | Mod: GO

## 2023-06-29 PROCEDURE — 97110 THERAPEUTIC EXERCISES: CPT | Mod: GP | Performed by: REHABILITATION PRACTITIONER

## 2023-06-29 PROCEDURE — 36415 COLL VENOUS BLD VENIPUNCTURE: CPT | Performed by: INTERNAL MEDICINE

## 2023-06-29 PROCEDURE — 85610 PROTHROMBIN TIME: CPT | Performed by: INTERNAL MEDICINE

## 2023-06-29 PROCEDURE — 250N000013 HC RX MED GY IP 250 OP 250 PS 637: Performed by: INTERNAL MEDICINE

## 2023-06-29 PROCEDURE — 83735 ASSAY OF MAGNESIUM: CPT | Performed by: INTERNAL MEDICINE

## 2023-06-29 PROCEDURE — 85027 COMPLETE CBC AUTOMATED: CPT | Performed by: INTERNAL MEDICINE

## 2023-06-29 PROCEDURE — 85007 BL SMEAR W/DIFF WBC COUNT: CPT | Performed by: INTERNAL MEDICINE

## 2023-06-29 PROCEDURE — 36592 COLLECT BLOOD FROM PICC: CPT | Performed by: INTERNAL MEDICINE

## 2023-06-29 PROCEDURE — 250N000011 HC RX IP 250 OP 636: Performed by: INTERNAL MEDICINE

## 2023-06-29 PROCEDURE — 250N000012 HC RX MED GY IP 250 OP 636 PS 637: Performed by: INTERNAL MEDICINE

## 2023-06-29 PROCEDURE — 120N000009 HC R&B SNF

## 2023-06-29 RX ORDER — MAGNESIUM SULFATE HEPTAHYDRATE 40 MG/ML
4 INJECTION, SOLUTION INTRAVENOUS ONCE
Status: COMPLETED | OUTPATIENT
Start: 2023-06-29 | End: 2023-06-29

## 2023-06-29 RX ADMIN — HEPARIN SODIUM 1600 UNITS/HR: 10000 INJECTION, SOLUTION INTRAVENOUS at 02:10

## 2023-06-29 RX ADMIN — ASPIRIN 325 MG: 325 TABLET, FILM COATED ORAL at 09:23

## 2023-06-29 RX ADMIN — MAGNESIUM SULFATE HEPTAHYDRATE 4 G: 40 INJECTION, SOLUTION INTRAVENOUS at 11:29

## 2023-06-29 RX ADMIN — PREDNISONE 20 MG: 20 TABLET ORAL at 09:23

## 2023-06-29 RX ADMIN — MAGNESIUM OXIDE TAB 400 MG (241.3 MG ELEMENTAL MG) 400 MG: 400 (241.3 MG) TAB at 12:54

## 2023-06-29 RX ADMIN — B-COMPLEX W/ C & FOLIC ACID TAB 1 MG 1 TABLET: 1 TAB at 12:54

## 2023-06-29 RX ADMIN — URSODIOL 250 MG: 250 TABLET ORAL at 09:23

## 2023-06-29 RX ADMIN — MYCOPHENOLATE MOFETIL 750 MG: 250 CAPSULE ORAL at 17:58

## 2023-06-29 RX ADMIN — URSODIOL 250 MG: 250 TABLET ORAL at 21:12

## 2023-06-29 RX ADMIN — Medication 12.5 MG: at 06:26

## 2023-06-29 RX ADMIN — GABAPENTIN 100 MG: 100 CAPSULE ORAL at 21:12

## 2023-06-29 RX ADMIN — INSULIN ASPART 2 UNITS: 100 INJECTION, SOLUTION INTRAVENOUS; SUBCUTANEOUS at 12:58

## 2023-06-29 RX ADMIN — PANTOPRAZOLE SODIUM 40 MG: 40 TABLET, DELAYED RELEASE ORAL at 06:26

## 2023-06-29 RX ADMIN — ATORVASTATIN CALCIUM 20 MG: 10 TABLET, FILM COATED ORAL at 21:12

## 2023-06-29 RX ADMIN — VALGANCICLOVIR 450 MG: 450 TABLET, FILM COATED ORAL at 21:12

## 2023-06-29 RX ADMIN — MYCOPHENOLATE MOFETIL 750 MG: 250 CAPSULE ORAL at 09:23

## 2023-06-29 RX ADMIN — GABAPENTIN 100 MG: 100 CAPSULE ORAL at 09:24

## 2023-06-29 RX ADMIN — BUMETANIDE 2 MG: 1 TABLET ORAL at 09:23

## 2023-06-29 RX ADMIN — HEPARIN, PORCINE (PF) 10 UNIT/ML INTRAVENOUS SYRINGE 10 ML: at 09:34

## 2023-06-29 RX ADMIN — NYSTATIN 500000 UNITS: 500000 SUSPENSION ORAL at 12:54

## 2023-06-29 RX ADMIN — GABAPENTIN 100 MG: 100 CAPSULE ORAL at 13:35

## 2023-06-29 RX ADMIN — HEPARIN SODIUM 1400 UNITS/HR: 10000 INJECTION, SOLUTION INTRAVENOUS at 19:15

## 2023-06-29 RX ADMIN — INSULIN ASPART 2 UNITS: 100 INJECTION, SOLUTION INTRAVENOUS; SUBCUTANEOUS at 22:04

## 2023-06-29 RX ADMIN — INSULIN ASPART 2 UNITS: 100 INJECTION, SOLUTION INTRAVENOUS; SUBCUTANEOUS at 12:54

## 2023-06-29 RX ADMIN — MAGNESIUM OXIDE TAB 400 MG (241.3 MG ELEMENTAL MG) 400 MG: 400 (241.3 MG) TAB at 21:12

## 2023-06-29 RX ADMIN — INSULIN ASPART 10 UNITS: 100 INJECTION, SOLUTION INTRAVENOUS; SUBCUTANEOUS at 15:11

## 2023-06-29 RX ADMIN — INSULIN ASPART: 100 INJECTION, SOLUTION INTRAVENOUS; SUBCUTANEOUS at 10:03

## 2023-06-29 RX ADMIN — WARFARIN SODIUM 3 MG: 2.5 TABLET ORAL at 17:58

## 2023-06-29 RX ADMIN — TACROLIMUS 2 MG: 1 CAPSULE ORAL at 17:58

## 2023-06-29 RX ADMIN — INSULIN ASPART 3 UNITS: 100 INJECTION, SOLUTION INTRAVENOUS; SUBCUTANEOUS at 15:12

## 2023-06-29 RX ADMIN — BUMETANIDE 2 MG: 1 TABLET ORAL at 21:12

## 2023-06-29 RX ADMIN — TACROLIMUS 2 MG: 1 CAPSULE ORAL at 09:22

## 2023-06-29 RX ADMIN — NYSTATIN 500000 UNITS: 500000 SUSPENSION ORAL at 17:58

## 2023-06-29 RX ADMIN — HEPARIN, PORCINE (PF) 10 UNIT/ML INTRAVENOUS SYRINGE 5 ML: at 08:31

## 2023-06-29 RX ADMIN — NYSTATIN 500000 UNITS: 500000 SUSPENSION ORAL at 09:24

## 2023-06-29 RX ADMIN — NYSTATIN 500000 UNITS: 500000 SUSPENSION ORAL at 21:12

## 2023-06-29 RX ADMIN — ALLOPURINOL 100 MG: 100 TABLET ORAL at 09:25

## 2023-06-29 RX ADMIN — Medication 12.5 MG: at 21:12

## 2023-06-29 ASSESSMENT — ACTIVITIES OF DAILY LIVING (ADL)
ADLS_ACUITY_SCORE: 35

## 2023-06-29 NOTE — PROGRESS NOTES
EMR reviewed. Seen briefly. Says doing okay. BL feet pain- slightly better today. No new concern.   Leukocytosis ?steroid related.   Mg 1.5. on replacement protocol.   Heparin low intensity until inr 1.5. inr 1.47 today.   Continue to monitor.     Kishan Mcneill MD  [facility]  Contact information available via [facility] Paging/Directory    Vitals:    06/28/23 2203 06/29/23 0614 06/29/23 0754 06/29/23 0919   BP: 105/61 118/72 105/63    BP Location: Right arm Right arm Left arm    Patient Position:       Cuff Size:       Pulse: 113 100 93    Resp:   17    Temp:   97.8  F (36.6  C)    TempSrc:   Oral    SpO2: 100%  98%    Weight:    95.4 kg (210 lb 5.1 oz)       CMPRecent Labs   Lab 06/29/23  1134 06/29/23  0832 06/29/23  0801 06/28/23  2117 06/28/23  1707 06/28/23  0740 06/28/23  0644 06/27/23  1733 06/27/23  1416 06/27/23  0729 06/27/23  0651 06/25/23  0816 06/25/23  0516 06/24/23  0833 06/24/23  0731   NA  --   --   --   --   --   --  133*  --   --   --  132*  --  134*  --  132*   POTASSIUM  --   --   --   --   --   --  3.6  --   --   --  3.6  --  3.9  --  3.9   CHLORIDE  --   --   --   --   --   --  90*  --   --   --  86*  --  88*  --  88*   CO2  --   --   --   --   --   --  28  --   --   --  31*  --  29  --  27   ANIONGAP  --   --   --   --   --   --  15  --   --   --  15  --  17*  --  17*   *  --  132* 241* 348*   < > 130*   < >  --    < > 146*   < > 131*   < > 122*   BUN  --   --   --   --   --   --  81.9*  --   --   --  83.4*  --  89.2*  --  83.6*   CR  --   --   --   --   --   --  2.56*  --   --   --  2.82*  --  2.98*  --  3.07*   GFRESTIMATED  --   --   --   --   --   --  27*  --   --   --  24*  --  23*  --  22*   NAZARIO  --   --   --   --   --   --  9.1  --   --   --  8.9  --  9.2  --  9.0   MAG  --  1.5*  --   --   --   --  1.7  --  1.8  --  1.3*  --  1.5*  --  1.5*   PHOS  --   --   --   --   --   --  3.8  --   --   --  4.7*  --  3.8  --  4.0   PROTTOTAL  --   --   --   --   --    --  5.6*  --   --   --  5.9*  --  6.1*  --  5.9*   ALBUMIN  --   --   --   --   --   --  2.8*  --   --   --  2.9*  --  3.0*  --  2.7*   BILITOTAL  --   --   --   --   --   --  0.7  --   --   --  0.9  --  0.9  --  1.0   ALKPHOS  --   --   --   --   --   --  338*  --   --   --  362*  --  429*  --  393*   AST  --   --   --   --   --   --  29  --   --   --  33  --  58*  --  61*   ALT  --   --   --   --   --   --  73*  --   --   --  84*  --  118*  --  100*    < > = values in this interval not displayed.     CBC  Recent Labs   Lab 06/29/23  0854 06/28/23  0644 06/27/23  0651 06/25/23  1620   WBC 17.1* 12.2* 12.0* 11.6*   RBC 2.54* 2.44* 2.62* 2.48*   HGB 7.9* 7.5* 7.8* 7.5*   HCT 24.8* 23.4* 25.0* 23.5*   MCV 98 96 95 95   MCH 31.1 30.7 29.8 30.2   MCHC 31.9 32.1 31.2* 31.9   RDW 18.4* 18.3* 18.5* 18.7*   * 436 446 374     INR  Recent Labs   Lab 06/29/23  0832 06/28/23  0644 06/27/23  0651 06/26/23  0205   INR 1.47* 1.36* 1.22* 1.10     Arterial Blood GasNo lab results found in last 7 days.

## 2023-06-29 NOTE — PROGRESS NOTES
Status at Admission - 6/26/23 Current Status - 6/29/23   Bed Mobility   Min A for rolling with use of bed rail, mod A for supine to sit use of bed rails, increased time, LE and trunk support and coordination   unchanged   Transfer   Sit to stand: max A x 1, Mod A x 1 for simple standing from elevated bed with FWW, pt struggles to appropriately coordinate task    Max A x 1, Mod A x 1 for simple standing from elevated bed with FWW. Pt is unable to safely coordinate task and hold on to walker. Removed the walker and was able to coordinate partial stand pivot with same assist levels. Premature sit and repositioning needed   Mod A x 2 for sit to stand and bed to chair transfer using Debbie Steady, sit to  parallel bars with Max A x 1, pt unable to obtain fully upright posture but is improved from admission   Gait   unable   unable   Stairs   unable   unable   Wheelchair Propulsion   dependent   dependent         Assessment of Progress Since Last Update: Patient has only been on unit a few day and is severely deconditioned. He has been an active participant in his therapies. He has improved with participation in transfers, working towards assist of 1.   Barriers to Progress: Recovery from recent surgery is painful and impacts tolerance for activity. Patient has pain in both feet that hurts when touched and when standing.    Proposed Solutions to Improve Barriers: Anticipate patient will improved with continued therapy and patient will continue to feel better as he recovers from surgery. Medical team is addressing foot pain.   Justification for Continued Therapy Services: Patient has siginifcant deconditioning the was present leading up to his transplants. He will require physical therapy to improve activity tolerance, functional mobility and strengthening to return to high level of function, reduce caregiver burden and to reduce risk of rehospitilization.   Additional Considerations for Safe and Efficient  Discharge: Patient may need wheelchair depending on progress, will continue to assess.

## 2023-06-29 NOTE — PROGRESS NOTES
06/29/23 1430   Appointment Info   Signing Clinician's Name / Credentials (OT) Potrer Verduzco, OTR/L   OT Goals   Therapy Frequency (OT) 6 times/wk   OT Predicted Duration/Target Date for Goal Attainment 07/17/23   OT: Hygiene/Grooming modified independent   OT: Upper Body Dressing Modified independent   OT: Lower Body Dressing Modified independent   OT: Upper Body Bathing Supervision/stand-by assist   OT: Lower Body Bathing Supervision/stand-by assist   OT: Toilet Transfer/Toileting Modified independent   OT: Meal Preparation Modified independent   OT: Home Management Modified independent   OT: Cognitive Patient/caregiver will verbalize understanding of cognitive assessment results/recommendations as needed for safe discharge planning   Therapeutic Procedures/Exercise   Therapeutic Procedure: strength, endurance, ROM, flexibillity minutes (31020) 20   Symptoms Noted During/After Treatment shortness of breath;fatigue   Treatment Detail/Skilled Intervention OT: Tx focus UB/CV endurance required to promote activity tolerance to progress functional transfers and ADL independence. Pt seated edge of recliner, performed UB calisthenic exercises, 2 sets x 3 ~45 second bouts. Pt requiring rest breaks between reps, noted improvement as required less recovery time than previous session. A for technique and verbal cues for PLB throughout.   OT Discharge Planning   OT Plan OT: abd precautions, fall, monitor cognition, Tx: EOB/EOM for core/trunk strength, progress STS with boaz steady, monitor cognition, ADLs from w/c/recliner/EOB, trial bed-based bridging for LBD   OT Discharge Recommendation (DC Rec) home with assist;home with home care occupational therapy   OT Brief overview of current status OT: Pt progressing well w/ activity tolerance and working towards improved ease/ind w/ transfers. Pt biggest barriers to performanec are worsening gout, decreased activity tolerance, abdominal precautions, and overall weakness. Continue  skilled inpatient OT to increase ease/ind/safety w/ ADL performance and mobility.   Total Session Time   Timed Code Treatment Minutes 20   Total Session Time (sum of timed and untimed services) 20   Post Acute Settings Only   What unit is patient on? TCU

## 2023-06-29 NOTE — PROGRESS NOTES
SPIRITUAL HEALTH SERVICES  [facility] WB UNIT TCU    REFERRAL SOURCE: Length of stay    Introductory visit to assess emotional and spiritual needs in light of length TCU stay. Casey in good spirits today, which he attributed to a good PT session today and a good night of rest. Sources of support include wife and daughter, who live in the area, as well as son and grandchildren, who live in [place]. Also regularly speaks with several friends over the phone.    Explored patterns of good vs. Challenging PT sessions. Stated that good night of rest is key for him. Brainstormed additional strategies for preparing for positive PT session and goal setting; said he does sometimes do visualization exercises at night, and that they energize and motivate him. Encouraged Casey to consider trying this before beginning a PT or OT session to see if it impacts how a session goes; Casey indicated he would try this.     PLAN: I will continue to follow, per confirmation from Casey that he welcomes follow up visits. I oriented to  availability and how to request support during hospital stay.      MARISLEA Bautista.   Associate

## 2023-06-29 NOTE — PLAN OF CARE
[id number]  Patient is alert and oriented x4. Able to make needs known to staff. Patient is continent of both bowel and bladder. Transfers liko lift. Patient denied Chest pain, SOB, new onset cough and N&V. Diet: Regular diet with good appetite. PIV to RUE is intact, dressing dry, and  Continuous heparin drip infusing. 2L PICC to LUE is intact and heparin locked. Heparin drip rate changed to 1400 units/hr per order parameters at 1043.    [id number]  Magnesium replaced and recheck was 3.0. BG was 385 dose administered, recheck was 365 then provider notified and one time order for 4 units of additional insulin administered. Rechecked was 326. Bedtime blood sugar was 231. Heparin drip still running at 1400 units/hr. Lab draw scheduled for 0003. Call-light within reach. Continue with POC.    Vital signs: T: 97.8,  B/P: 92/58,  P: 77,  R: 17,  SpO2: 98 %     Patient does not have new respiratory symptoms.  Patient does not have new sore throat.  Patient does not have a fever greater than 99.5.

## 2023-06-29 NOTE — PROGRESS NOTES
MDS Pain Assessment    The following is the pain interview as conducted by the TCU RN caring for the patient on June / 30 / 2023. This assessment is required by the [facility] for all patients in [place] SNF (Skilled Nursing Facilities).       1. Have you had pain or hurting at any time in the last 5 days? No    2. How much of the time have you experienced pain or hurting over the last 5 days? rarely    3. Over the past 5 days, has pain made it hard for you to sleep at night? No    4. Over the past 5 days, have you limited your day-to-day activities because of pain? No    5. Pain intensity (Numeric scale used first-if patient unable to answer, verbal scale to be used.)    Numeric Scale: Please rate your worst pain over the last 5 days on a zero to ten scale, with zero being no pain and ten being the worst pain you can imagine.     4    Verbal Scale: USED ONLY if unable to give numeric, otherwise N/A  Please rate the intensity of your worst pain over the last 5 days.     not applicable

## 2023-06-29 NOTE — PLAN OF CARE
Pt is alert and oriented X 4 , Pt has heparin continuous iv running at 16 ml/hr new bag hanged , Magnesium and heparin is RN managed , labs ordered for 0600. Pt was comfortable in bed during safety checks , denies SOB or CP . Pt is on regular diet with BG checks , continent of B&B and has a urinal by bed side emptied by staff, no Bm on this shift  . Blood pressure with WNL metoprolol administered, Call light within reach will continue with plan if care.        Patient's most recent vital signs are:     Vital signs:  BP: 118/72  Temp: 98.2  HR: 100  RR: 18  SpO2: 100 %     Patient does not have new respiratory symptoms.  Patient does not have new sore throat.  Patient does not have a fever greater than 99.5.

## 2023-06-30 ENCOUNTER — APPOINTMENT (OUTPATIENT)
Dept: OCCUPATIONAL THERAPY | Facility: SKILLED NURSING FACILITY | Age: 68
End: 2023-06-30
Attending: INTERNAL MEDICINE
Payer: COMMERCIAL

## 2023-06-30 ENCOUNTER — APPOINTMENT (OUTPATIENT)
Dept: PHYSICAL THERAPY | Facility: SKILLED NURSING FACILITY | Age: 68
End: 2023-06-30
Attending: INTERNAL MEDICINE
Payer: COMMERCIAL

## 2023-06-30 LAB
ALBUMIN SERPL BCG-MCNC: 2.9 G/DL (ref 3.5–5.2)
ALP SERPL-CCNC: 334 U/L (ref 40–129)
ALT SERPL W P-5'-P-CCNC: 68 U/L (ref 0–70)
ANION GAP SERPL CALCULATED.3IONS-SCNC: 14 MMOL/L (ref 7–15)
AST SERPL W P-5'-P-CCNC: 36 U/L (ref 0–45)
BILIRUB SERPL-MCNC: 0.6 MG/DL
BUN SERPL-MCNC: 69.2 MG/DL (ref 8–23)
CALCIUM SERPL-MCNC: 8.6 MG/DL (ref 8.8–10.2)
CHLORIDE SERPL-SCNC: 88 MMOL/L (ref 98–107)
CREAT SERPL-MCNC: 2.04 MG/DL (ref 0.67–1.17)
DEPRECATED HCO3 PLAS-SCNC: 27 MMOL/L (ref 22–29)
GFR SERPL CREATININE-BSD FRML MDRD: 36 ML/MIN/1.73M2
GLUCOSE BLDC GLUCOMTR-MCNC: 163 MG/DL (ref 70–99)
GLUCOSE BLDC GLUCOMTR-MCNC: 219 MG/DL (ref 70–99)
GLUCOSE BLDC GLUCOMTR-MCNC: 222 MG/DL (ref 70–99)
GLUCOSE BLDC GLUCOMTR-MCNC: 233 MG/DL (ref 70–99)
GLUCOSE SERPL-MCNC: 182 MG/DL (ref 70–99)
INR PPP: 1.75 (ref 0.85–1.15)
MAGNESIUM SERPL-MCNC: 2.1 MG/DL (ref 1.7–2.3)
PHOSPHATE SERPL-MCNC: 2.1 MG/DL (ref 2.5–4.5)
POTASSIUM SERPL-SCNC: 3.7 MMOL/L (ref 3.4–5.3)
PROT SERPL-MCNC: 5.5 G/DL (ref 6.4–8.3)
SODIUM SERPL-SCNC: 129 MMOL/L (ref 136–145)
TACROLIMUS BLD-MCNC: 6.3 UG/L (ref 5–15)
TME LAST DOSE: NORMAL H
TME LAST DOSE: NORMAL H
UFH PPP CHRO-ACNC: 0.33 IU/ML
UFH PPP CHRO-ACNC: 0.33 IU/ML

## 2023-06-30 PROCEDURE — 250N000012 HC RX MED GY IP 250 OP 636 PS 637: Performed by: INTERNAL MEDICINE

## 2023-06-30 PROCEDURE — 97530 THERAPEUTIC ACTIVITIES: CPT | Mod: GO

## 2023-06-30 PROCEDURE — 85520 HEPARIN ASSAY: CPT | Performed by: INTERNAL MEDICINE

## 2023-06-30 PROCEDURE — 97530 THERAPEUTIC ACTIVITIES: CPT | Mod: GP | Performed by: PHYSICAL THERAPIST

## 2023-06-30 PROCEDURE — 84100 ASSAY OF PHOSPHORUS: CPT | Performed by: INTERNAL MEDICINE

## 2023-06-30 PROCEDURE — 250N000013 HC RX MED GY IP 250 OP 250 PS 637: Performed by: INTERNAL MEDICINE

## 2023-06-30 PROCEDURE — 80197 ASSAY OF TACROLIMUS: CPT | Performed by: INTERNAL MEDICINE

## 2023-06-30 PROCEDURE — 97535 SELF CARE MNGMENT TRAINING: CPT | Mod: GO

## 2023-06-30 PROCEDURE — 80053 COMPREHEN METABOLIC PANEL: CPT | Performed by: INTERNAL MEDICINE

## 2023-06-30 PROCEDURE — 85610 PROTHROMBIN TIME: CPT | Performed by: INTERNAL MEDICINE

## 2023-06-30 PROCEDURE — 36592 COLLECT BLOOD FROM PICC: CPT | Performed by: INTERNAL MEDICINE

## 2023-06-30 PROCEDURE — 83735 ASSAY OF MAGNESIUM: CPT | Performed by: INTERNAL MEDICINE

## 2023-06-30 PROCEDURE — 97110 THERAPEUTIC EXERCISES: CPT | Mod: GO

## 2023-06-30 PROCEDURE — 120N000009 HC R&B SNF

## 2023-06-30 PROCEDURE — 97110 THERAPEUTIC EXERCISES: CPT | Mod: GP | Performed by: PHYSICAL THERAPIST

## 2023-06-30 PROCEDURE — 250N000011 HC RX IP 250 OP 636: Performed by: INTERNAL MEDICINE

## 2023-06-30 RX ORDER — SULFAMETHOXAZOLE AND TRIMETHOPRIM 400; 80 MG/1; MG/1
1 TABLET ORAL DAILY
Status: DISCONTINUED | OUTPATIENT
Start: 2023-07-01 | End: 2023-07-27 | Stop reason: HOSPADM

## 2023-06-30 RX ORDER — WARFARIN SODIUM 2.5 MG/1
2.5 TABLET ORAL
Status: COMPLETED | OUTPATIENT
Start: 2023-06-30 | End: 2023-06-30

## 2023-06-30 RX ADMIN — ATORVASTATIN CALCIUM 20 MG: 10 TABLET, FILM COATED ORAL at 21:26

## 2023-06-30 RX ADMIN — INSULIN ASPART 1 UNITS: 100 INJECTION, SOLUTION INTRAVENOUS; SUBCUTANEOUS at 08:18

## 2023-06-30 RX ADMIN — GABAPENTIN 100 MG: 100 CAPSULE ORAL at 08:17

## 2023-06-30 RX ADMIN — TACROLIMUS 2 MG: 1 CAPSULE ORAL at 08:17

## 2023-06-30 RX ADMIN — HEPARIN, PORCINE (PF) 10 UNIT/ML INTRAVENOUS SYRINGE 10 ML: at 12:17

## 2023-06-30 RX ADMIN — SULFAMETHOXAZOLE AND TRIMETHOPRIM 1 TABLET: 400; 80 TABLET ORAL at 08:17

## 2023-06-30 RX ADMIN — INSULIN ASPART 2 UNITS: 100 INJECTION, SOLUTION INTRAVENOUS; SUBCUTANEOUS at 21:32

## 2023-06-30 RX ADMIN — B-COMPLEX W/ C & FOLIC ACID TAB 1 MG 1 TABLET: 1 TAB at 12:17

## 2023-06-30 RX ADMIN — HEPARIN, PORCINE (PF) 10 UNIT/ML INTRAVENOUS SYRINGE 5 ML: at 07:06

## 2023-06-30 RX ADMIN — MYCOPHENOLATE MOFETIL 750 MG: 250 CAPSULE ORAL at 08:17

## 2023-06-30 RX ADMIN — NYSTATIN 500000 UNITS: 500000 SUSPENSION ORAL at 12:17

## 2023-06-30 RX ADMIN — Medication 12.5 MG: at 06:26

## 2023-06-30 RX ADMIN — INSULIN ASPART 4 UNITS: 100 INJECTION, SOLUTION INTRAVENOUS; SUBCUTANEOUS at 08:23

## 2023-06-30 RX ADMIN — URSODIOL 250 MG: 250 TABLET ORAL at 21:25

## 2023-06-30 RX ADMIN — MAGNESIUM OXIDE TAB 400 MG (241.3 MG ELEMENTAL MG) 400 MG: 400 (241.3 MG) TAB at 12:17

## 2023-06-30 RX ADMIN — POLYETHYLENE GLYCOL 3350 17 G: 17 POWDER, FOR SOLUTION ORAL at 08:21

## 2023-06-30 RX ADMIN — PREDNISONE 10 MG: 5 TABLET ORAL at 08:17

## 2023-06-30 RX ADMIN — INSULIN ASPART 4 UNITS: 100 INJECTION, SOLUTION INTRAVENOUS; SUBCUTANEOUS at 18:43

## 2023-06-30 RX ADMIN — GABAPENTIN 100 MG: 100 CAPSULE ORAL at 13:48

## 2023-06-30 RX ADMIN — BUMETANIDE 2 MG: 1 TABLET ORAL at 08:17

## 2023-06-30 RX ADMIN — ASPIRIN 325 MG: 325 TABLET, FILM COATED ORAL at 08:17

## 2023-06-30 RX ADMIN — INSULIN ASPART 4 UNITS: 100 INJECTION, SOLUTION INTRAVENOUS; SUBCUTANEOUS at 12:18

## 2023-06-30 RX ADMIN — TACROLIMUS 2 MG: 1 CAPSULE ORAL at 18:43

## 2023-06-30 RX ADMIN — INSULIN ASPART 4 UNITS: 100 INJECTION, SOLUTION INTRAVENOUS; SUBCUTANEOUS at 12:19

## 2023-06-30 RX ADMIN — MAGNESIUM OXIDE TAB 400 MG (241.3 MG ELEMENTAL MG) 400 MG: 400 (241.3 MG) TAB at 21:26

## 2023-06-30 RX ADMIN — ALLOPURINOL 100 MG: 100 TABLET ORAL at 08:17

## 2023-06-30 RX ADMIN — GABAPENTIN 100 MG: 100 CAPSULE ORAL at 21:26

## 2023-06-30 RX ADMIN — PANTOPRAZOLE SODIUM 40 MG: 40 TABLET, DELAYED RELEASE ORAL at 06:24

## 2023-06-30 RX ADMIN — INSULIN ASPART 3 UNITS: 100 INJECTION, SOLUTION INTRAVENOUS; SUBCUTANEOUS at 18:45

## 2023-06-30 RX ADMIN — WARFARIN SODIUM 2.5 MG: 2.5 TABLET ORAL at 18:43

## 2023-06-30 RX ADMIN — URSODIOL 250 MG: 250 TABLET ORAL at 08:17

## 2023-06-30 RX ADMIN — NYSTATIN 500000 UNITS: 500000 SUSPENSION ORAL at 21:26

## 2023-06-30 RX ADMIN — MYCOPHENOLATE MOFETIL 750 MG: 250 CAPSULE ORAL at 18:43

## 2023-06-30 RX ADMIN — HEPARIN, PORCINE (PF) 10 UNIT/ML INTRAVENOUS SYRINGE 5 ML: at 00:17

## 2023-06-30 ASSESSMENT — ACTIVITIES OF DAILY LIVING (ADL)
ADLS_ACUITY_SCORE: 35

## 2023-06-30 NOTE — PLAN OF CARE
Goal Outcome Evaluation:         Pt alert and oriented x4, able to make needs known. No c/o chest pain, SOB, N/V. Heparin unfractionated anti Xa level 0.33 at 0016H. No changes with dosing. Next lab draw scheduled at 0730H. Pt was observed sleeping in between cares. Heparin drip running at 1,400 units/hr. Will continue with POC.

## 2023-06-30 NOTE — PROGRESS NOTES
Transplant Surgery Immunosuppression Management Note:  Damion Quinones is a 67 year old male with history of cirrhosis (alcohol + hemochromatosis), ESKD (IgA nephropathy + ANCA vasculitis), PAF, obesity, HTN, pre-diabetes, rheumatoid and psoriatic arthritis, and CAD. S/p simultaneous liver and kidney transplant on 6/6/2023 complicated by DGF, AF RVR, shock, gout flare, right internal jugular clot, and malnutrition.      POD # 24    Liver transplant w/ complex reconstruction of accessory right hepatic artery: LFTs WNL. AP remains elevated at 334. No biliary stent.    Kidney transplant w/ stent, delayed graft function:   Cr 3->2.8->2.0, last HD 6/19. UOP 1500. 6/20 biopsy: ATN, no rejection.     Immunosuppression:  Induction: Steroid taper and Thymoglobulin 400 mg (4 mg/kg).   Maintenance:   -  mg BID  - Tacrolimus goal level 5-8. Tac level 6.3, no change.  - Prednisone for rheumatoid and psoriatic arthritis and gout flare. Taper down to chronic dose of 10mg daily.  Infection prophylaxis: PJP (bactrim), viral (Valcyte x 12 weeks)    Transplant coordinator: Cindy Clay (Liver), Angelita Torres (Kidney) [phone]  Donor type: DBD  DSA at time of transplant:  Yes CW9 6/5/23  Ureteral stent: Yes  Biliary stent: No    Rekha Hernandez PA-C [phone]

## 2023-06-30 NOTE — PLAN OF CARE
Patient is alert and oriented x4. Able to make needs known to staff. Patient is continent of both bowel and bladder. Transfers liko lift. Patient denied Chest pain, SOB, new onset cough and N&V. Diet: Regular diet with good appetite. PIV to RUE removed per patient's request after heparin drip was discontinued. LUE 2L PICC is intact and heparin locked. Spouse at bedside for most of shift. Denied pain. BGs were 163 and 222 w/ sliding scale and carb coverage. Continue with POC.     Vital signs: T: 96.4,  B/P: 109/57,  P: 97,  R: 16,  SpO2: 98 %     Patient does not have new respiratory symptoms.  Patient does not have new sore throat.  Patient does not have a fever greater than 99.5.

## 2023-07-01 LAB
ANION GAP SERPL CALCULATED.3IONS-SCNC: 15 MMOL/L (ref 7–15)
BUN SERPL-MCNC: 70.1 MG/DL (ref 8–23)
CALCIUM SERPL-MCNC: 9.1 MG/DL (ref 8.8–10.2)
CHLORIDE SERPL-SCNC: 90 MMOL/L (ref 98–107)
CREAT SERPL-MCNC: 2.24 MG/DL (ref 0.67–1.17)
DEPRECATED HCO3 PLAS-SCNC: 26 MMOL/L (ref 22–29)
GFR SERPL CREATININE-BSD FRML MDRD: 32 ML/MIN/1.73M2
GLUCOSE BLDC GLUCOMTR-MCNC: 114 MG/DL (ref 70–99)
GLUCOSE BLDC GLUCOMTR-MCNC: 114 MG/DL (ref 70–99)
GLUCOSE BLDC GLUCOMTR-MCNC: 191 MG/DL (ref 70–99)
GLUCOSE BLDC GLUCOMTR-MCNC: 196 MG/DL (ref 70–99)
GLUCOSE BLDC GLUCOMTR-MCNC: 264 MG/DL (ref 70–99)
GLUCOSE SERPL-MCNC: 157 MG/DL (ref 70–99)
HOLD SPECIMEN: NORMAL
INR PPP: 1.83 (ref 0.85–1.15)
MAGNESIUM SERPL-MCNC: 1.9 MG/DL (ref 1.7–2.3)
POTASSIUM SERPL-SCNC: 3.8 MMOL/L (ref 3.4–5.3)
SODIUM SERPL-SCNC: 131 MMOL/L (ref 136–145)

## 2023-07-01 PROCEDURE — 250N000013 HC RX MED GY IP 250 OP 250 PS 637

## 2023-07-01 PROCEDURE — 83735 ASSAY OF MAGNESIUM: CPT | Performed by: INTERNAL MEDICINE

## 2023-07-01 PROCEDURE — 250N000013 HC RX MED GY IP 250 OP 250 PS 637: Performed by: INTERNAL MEDICINE

## 2023-07-01 PROCEDURE — 80048 BASIC METABOLIC PNL TOTAL CA: CPT | Performed by: INTERNAL MEDICINE

## 2023-07-01 PROCEDURE — 250N000012 HC RX MED GY IP 250 OP 636 PS 637: Performed by: INTERNAL MEDICINE

## 2023-07-01 PROCEDURE — 36415 COLL VENOUS BLD VENIPUNCTURE: CPT | Performed by: INTERNAL MEDICINE

## 2023-07-01 PROCEDURE — 120N000009 HC R&B SNF

## 2023-07-01 PROCEDURE — 85610 PROTHROMBIN TIME: CPT | Performed by: INTERNAL MEDICINE

## 2023-07-01 PROCEDURE — 250N000011 HC RX IP 250 OP 636: Performed by: INTERNAL MEDICINE

## 2023-07-01 RX ORDER — WARFARIN SODIUM 2.5 MG/1
2.5 TABLET ORAL
Status: COMPLETED | OUTPATIENT
Start: 2023-07-01 | End: 2023-07-01

## 2023-07-01 RX ORDER — BUMETANIDE 1 MG/1
2 TABLET ORAL 2 TIMES DAILY
Status: DISCONTINUED | OUTPATIENT
Start: 2023-07-01 | End: 2023-07-25

## 2023-07-01 RX ADMIN — INSULIN ASPART 6 UNITS: 100 INJECTION, SOLUTION INTRAVENOUS; SUBCUTANEOUS at 08:36

## 2023-07-01 RX ADMIN — Medication 12.5 MG: at 06:34

## 2023-07-01 RX ADMIN — MAGNESIUM OXIDE TAB 400 MG (241.3 MG ELEMENTAL MG) 400 MG: 400 (241.3 MG) TAB at 20:34

## 2023-07-01 RX ADMIN — NYSTATIN 500000 UNITS: 500000 SUSPENSION ORAL at 12:30

## 2023-07-01 RX ADMIN — BUMETANIDE 2 MG: 1 TABLET ORAL at 08:35

## 2023-07-01 RX ADMIN — ASPIRIN 325 MG: 325 TABLET, FILM COATED ORAL at 08:35

## 2023-07-01 RX ADMIN — B-COMPLEX W/ C & FOLIC ACID TAB 1 MG 1 TABLET: 1 TAB at 12:30

## 2023-07-01 RX ADMIN — NYSTATIN 500000 UNITS: 500000 SUSPENSION ORAL at 08:36

## 2023-07-01 RX ADMIN — URSODIOL 250 MG: 250 TABLET ORAL at 20:33

## 2023-07-01 RX ADMIN — ATORVASTATIN CALCIUM 20 MG: 10 TABLET, FILM COATED ORAL at 20:33

## 2023-07-01 RX ADMIN — GABAPENTIN 100 MG: 100 CAPSULE ORAL at 13:26

## 2023-07-01 RX ADMIN — ALLOPURINOL 100 MG: 100 TABLET ORAL at 08:35

## 2023-07-01 RX ADMIN — POTASSIUM & SODIUM PHOSPHATES POWDER PACK 280-160-250 MG 1 PACKET: 280-160-250 PACK at 16:12

## 2023-07-01 RX ADMIN — MYCOPHENOLATE MOFETIL 750 MG: 250 CAPSULE ORAL at 08:35

## 2023-07-01 RX ADMIN — INSULIN ASPART 3 UNITS: 100 INJECTION, SOLUTION INTRAVENOUS; SUBCUTANEOUS at 12:30

## 2023-07-01 RX ADMIN — INSULIN ASPART 5 UNITS: 100 INJECTION, SOLUTION INTRAVENOUS; SUBCUTANEOUS at 17:45

## 2023-07-01 RX ADMIN — SULFAMETHOXAZOLE AND TRIMETHOPRIM 1 TABLET: 400; 80 TABLET ORAL at 08:35

## 2023-07-01 RX ADMIN — WARFARIN SODIUM 2.5 MG: 2.5 TABLET ORAL at 17:45

## 2023-07-01 RX ADMIN — GABAPENTIN 100 MG: 100 CAPSULE ORAL at 08:35

## 2023-07-01 RX ADMIN — VALGANCICLOVIR 450 MG: 450 TABLET, FILM COATED ORAL at 20:34

## 2023-07-01 RX ADMIN — NYSTATIN 500000 UNITS: 500000 SUSPENSION ORAL at 16:12

## 2023-07-01 RX ADMIN — NYSTATIN 500000 UNITS: 500000 SUSPENSION ORAL at 20:33

## 2023-07-01 RX ADMIN — MAGNESIUM OXIDE TAB 400 MG (241.3 MG ELEMENTAL MG) 400 MG: 400 (241.3 MG) TAB at 12:30

## 2023-07-01 RX ADMIN — HEPARIN, PORCINE (PF) 10 UNIT/ML INTRAVENOUS SYRINGE 5 ML: at 08:27

## 2023-07-01 RX ADMIN — Medication 12.5 MG: at 21:28

## 2023-07-01 RX ADMIN — GABAPENTIN 100 MG: 100 CAPSULE ORAL at 20:33

## 2023-07-01 RX ADMIN — TACROLIMUS 2 MG: 1 CAPSULE ORAL at 08:35

## 2023-07-01 RX ADMIN — MYCOPHENOLATE MOFETIL 750 MG: 250 CAPSULE ORAL at 17:45

## 2023-07-01 RX ADMIN — URSODIOL 250 MG: 250 TABLET ORAL at 08:35

## 2023-07-01 RX ADMIN — Medication 12.5 MG: at 13:27

## 2023-07-01 RX ADMIN — INSULIN ASPART 3 UNITS: 100 INJECTION, SOLUTION INTRAVENOUS; SUBCUTANEOUS at 17:47

## 2023-07-01 RX ADMIN — PREDNISONE 10 MG: 5 TABLET ORAL at 08:35

## 2023-07-01 RX ADMIN — PANTOPRAZOLE SODIUM 40 MG: 40 TABLET, DELAYED RELEASE ORAL at 06:34

## 2023-07-01 RX ADMIN — INSULIN ASPART 1 UNITS: 100 INJECTION, SOLUTION INTRAVENOUS; SUBCUTANEOUS at 12:33

## 2023-07-01 RX ADMIN — POTASSIUM & SODIUM PHOSPHATES POWDER PACK 280-160-250 MG 1 PACKET: 280-160-250 PACK at 12:30

## 2023-07-01 RX ADMIN — TACROLIMUS 2 MG: 1 CAPSULE ORAL at 17:45

## 2023-07-01 ASSESSMENT — ACTIVITIES OF DAILY LIVING (ADL)
ADLS_ACUITY_SCORE: 35
ADLS_ACUITY_SCORE: 36
ADLS_ACUITY_SCORE: 35
ADLS_ACUITY_SCORE: 36
ADLS_ACUITY_SCORE: 35
ADLS_ACUITY_SCORE: 35
ADLS_ACUITY_SCORE: 38
ADLS_ACUITY_SCORE: 38
ADLS_ACUITY_SCORE: 36
ADLS_ACUITY_SCORE: 38
ADLS_ACUITY_SCORE: 36
ADLS_ACUITY_SCORE: 35

## 2023-07-01 NOTE — PLAN OF CARE
Patient is alert and oriented x4. Able to make needs known to staff. Patient is continent of both bowel and bladder. Voids spontaneously without difficulty. Transfers assist -2 with liko lift. Patient denied Chest pain, SOB, new onset cough and N&V. Diet: Regular diet with good appetite. Two lumen PICC to LUE is intact, dressing dry, and heparinized. Mag level at 1.9. No need for replacement today. Phosphorus replaced this shift. BGs 114 and 196. Call-light within reach. Continue with POC.    Vital signs: T: 96.8,  B/P: 121/78,  P: 95,  R: 18,  SpO2: 100 %     Patient does not have new respiratory symptoms.  Patient does not have new sore throat.  Patient does not have a fever greater than 99.5.

## 2023-07-01 NOTE — PLAN OF CARE
Goal Outcome Evaluation:       Patient is on his bed at the start of the shift and up on his recliner at around 1700. Daughter came brought  food from outside. Ate dinner with good appetite. Had BM during the shift. Patient was transferred via Liko lift with 2 person assist. With sliding scale insulin and carbohydrate counting.RN manage magnesium protocol Mag 2.1 no replacement done.Metroprolol and Bumex not given d/t BP 98/54.patient denies light headed, dizzy,feeling nauseated and having emesis. Put a sticky note for Bumex to have BP parameters. Can make needs known. Call light with in reach.     and 233  Labs tomorrow: INR & BMP    ...Blood pressure 98/54, pulse 100, temperature (!) 96.4  F (35.8  C), temperature source Oral, resp. rate 18, weight 95.4 kg (210 lb 5.1 oz), SpO2 96 %.

## 2023-07-01 NOTE — PROGRESS NOTES
EMR reviewed.   IS dosing per Transplant team. See note 6/30. Prednisone dose lowered to 10 mg daily. Monitor BG.   Recent Labs   Lab 07/01/23  0832 07/01/23  0742 07/01/23  0208 06/30/23  2131 06/30/23  1706 06/30/23  1140   * 114* 114* 233* 219* 222*     K 2.1, replacement ordered.   Cr 2.2, continue to monitor.   No other concern reported.     Vitals:    06/30/23 1544 06/30/23 2123 07/01/23 0629 07/01/23 0851   BP: 99/67 98/54 118/70 121/78   BP Location: Left arm Left arm Right arm Right arm   Patient Position:  Sitting     Pulse: 98 100 99 95   Resp: 18  18 18   Temp:   (!) 96.2  F (35.7  C) 96.8  F (36  C)   TempSrc:   Oral Oral   SpO2: 96%  98% 100%   Weight:           CMPRecent Labs   Lab 07/01/23  0832 07/01/23  0742 07/01/23  0208 06/30/23  2131 06/30/23  1140 06/30/23  0716 06/29/23  1734 06/29/23  1710 06/29/23  1134 06/29/23  0832 06/28/23  0740 06/28/23  0644 06/27/23  0729 06/27/23  0651 06/25/23  0816 06/25/23  0516   *  --   --   --   --  129*  --   --   --   --   --  133*  --  132*  --  134*   POTASSIUM 3.8  --   --   --   --  3.7  --   --   --   --   --  3.6  --  3.6  --  3.9   CHLORIDE 90*  --   --   --   --  88*  --   --   --   --   --  90*  --  86*  --  88*   CO2 26  --   --   --   --  27  --   --   --   --   --  28  --  31*  --  29   ANIONGAP 15  --   --   --   --  14  --   --   --   --   --  15  --  15  --  17*   * 114* 114* 233*   < > 182*   < >  --    < >  --    < > 130*   < > 146*   < > 131*   BUN 70.1*  --   --   --   --  69.2*  --   --   --   --   --  81.9*  --  83.4*  --  89.2*   CR 2.24*  --   --   --   --  2.04*  --   --   --   --   --  2.56*  --  2.82*  --  2.98*   GFRESTIMATED 32*  --   --   --   --  36*  --   --   --   --   --  27*  --  24*  --  23*   NAZARIO 9.1  --   --   --   --  8.6*  --   --   --   --   --  9.1  --  8.9  --  9.2   MAG 1.9  --   --   --   --  2.1  --  3.0*  --  1.5*  --  1.7   < > 1.3*  --  1.5*   PHOS  --   --   --   --   --  2.1*  --   --   --    --   --  3.8  --  4.7*  --  3.8   PROTTOTAL  --   --   --   --   --  5.5*  --   --   --   --   --  5.6*  --  5.9*  --  6.1*   ALBUMIN  --   --   --   --   --  2.9*  --   --   --   --   --  2.8*  --  2.9*  --  3.0*   BILITOTAL  --   --   --   --   --  0.6  --   --   --   --   --  0.7  --  0.9  --  0.9   ALKPHOS  --   --   --   --   --  334*  --   --   --   --   --  338*  --  362*  --  429*   AST  --   --   --   --   --  36  --   --   --   --   --  29  --  33  --  58*   ALT  --   --   --   --   --  68  --   --   --   --   --  73*  --  84*  --  118*    < > = values in this interval not displayed.     CBC  Recent Labs   Lab 06/29/23  0854 06/28/23  0644 06/27/23  0651 06/25/23  1620   WBC 17.1* 12.2* 12.0* 11.6*   RBC 2.54* 2.44* 2.62* 2.48*   HGB 7.9* 7.5* 7.8* 7.5*   HCT 24.8* 23.4* 25.0* 23.5*   MCV 98 96 95 95   MCH 31.1 30.7 29.8 30.2   MCHC 31.9 32.1 31.2* 31.9   RDW 18.4* 18.3* 18.5* 18.7*   * 436 446 374     INR  Recent Labs   Lab 07/01/23  0832 06/30/23  0716 06/29/23  0832 06/28/23  0644   INR 1.83* 1.75* 1.47* 1.36*     Arterial Blood GasNo lab results found in last 7 days.

## 2023-07-01 NOTE — CARE PLAN
Goal Outcome Evaluation:    AOx4, pleasant and cooperative. Denies N/T, CP, SOB and cough ORA, Sats >90% and Stable.  NOOB this shift. Pt. Observed sleeping on bed overnight. Pt. Assist of 2 with Liko lift. Continent of bowels and bladder uses bedside urinal. Regular diet. Denies N/V. BG checks; sliding scale. BG at  , BG overnight 114. Denies pain overnight. Multiple bruises scattered  on patient. Double lumen PICC and  Lt dialysis port. Contact precautions maintained. Call light within reach, pt able to make needs known. RN replacement  - Magnesium Standard Replacement protocol. No acute change over night. Continue with POC.

## 2023-07-02 ENCOUNTER — APPOINTMENT (OUTPATIENT)
Dept: PHYSICAL THERAPY | Facility: SKILLED NURSING FACILITY | Age: 68
End: 2023-07-02
Attending: INTERNAL MEDICINE
Payer: COMMERCIAL

## 2023-07-02 ENCOUNTER — APPOINTMENT (OUTPATIENT)
Dept: OCCUPATIONAL THERAPY | Facility: SKILLED NURSING FACILITY | Age: 68
End: 2023-07-02
Attending: INTERNAL MEDICINE
Payer: COMMERCIAL

## 2023-07-02 LAB
GLUCOSE BLDC GLUCOMTR-MCNC: 108 MG/DL (ref 70–99)
GLUCOSE BLDC GLUCOMTR-MCNC: 190 MG/DL (ref 70–99)
GLUCOSE BLDC GLUCOMTR-MCNC: 271 MG/DL (ref 70–99)
GLUCOSE BLDC GLUCOMTR-MCNC: 282 MG/DL (ref 70–99)
INR PPP: 2.24 (ref 0.85–1.15)
MAGNESIUM SERPL-MCNC: 1.7 MG/DL (ref 1.7–2.3)

## 2023-07-02 PROCEDURE — 250N000012 HC RX MED GY IP 250 OP 636 PS 637: Performed by: INTERNAL MEDICINE

## 2023-07-02 PROCEDURE — 97530 THERAPEUTIC ACTIVITIES: CPT | Mod: GP | Performed by: PHYSICAL THERAPIST

## 2023-07-02 PROCEDURE — 85610 PROTHROMBIN TIME: CPT | Performed by: INTERNAL MEDICINE

## 2023-07-02 PROCEDURE — 97530 THERAPEUTIC ACTIVITIES: CPT | Mod: GO

## 2023-07-02 PROCEDURE — 250N000013 HC RX MED GY IP 250 OP 250 PS 637

## 2023-07-02 PROCEDURE — 250N000011 HC RX IP 250 OP 636: Performed by: INTERNAL MEDICINE

## 2023-07-02 PROCEDURE — 250N000013 HC RX MED GY IP 250 OP 250 PS 637: Performed by: INTERNAL MEDICINE

## 2023-07-02 PROCEDURE — 83735 ASSAY OF MAGNESIUM: CPT | Performed by: INTERNAL MEDICINE

## 2023-07-02 PROCEDURE — 120N000009 HC R&B SNF

## 2023-07-02 PROCEDURE — 022N000001 HC SNF RUG CODE OPNP

## 2023-07-02 RX ADMIN — ALLOPURINOL 100 MG: 100 TABLET ORAL at 10:05

## 2023-07-02 RX ADMIN — SULFAMETHOXAZOLE AND TRIMETHOPRIM 1 TABLET: 400; 80 TABLET ORAL at 10:05

## 2023-07-02 RX ADMIN — BUMETANIDE 2 MG: 1 TABLET ORAL at 10:06

## 2023-07-02 RX ADMIN — PREDNISONE 10 MG: 5 TABLET ORAL at 10:05

## 2023-07-02 RX ADMIN — GABAPENTIN 100 MG: 100 CAPSULE ORAL at 10:05

## 2023-07-02 RX ADMIN — B-COMPLEX W/ C & FOLIC ACID TAB 1 MG 1 TABLET: 1 TAB at 13:13

## 2023-07-02 RX ADMIN — NYSTATIN 500000 UNITS: 500000 SUSPENSION ORAL at 10:05

## 2023-07-02 RX ADMIN — MAGNESIUM OXIDE TAB 400 MG (241.3 MG ELEMENTAL MG) 400 MG: 400 (241.3 MG) TAB at 13:13

## 2023-07-02 RX ADMIN — NYSTATIN 500000 UNITS: 500000 SUSPENSION ORAL at 17:21

## 2023-07-02 RX ADMIN — URSODIOL 250 MG: 250 TABLET ORAL at 10:06

## 2023-07-02 RX ADMIN — INSULIN ASPART 7 UNITS: 100 INJECTION, SOLUTION INTRAVENOUS; SUBCUTANEOUS at 10:07

## 2023-07-02 RX ADMIN — WARFARIN SODIUM 1.25 MG: 2.5 TABLET ORAL at 17:35

## 2023-07-02 RX ADMIN — PANTOPRAZOLE SODIUM 40 MG: 40 TABLET, DELAYED RELEASE ORAL at 06:03

## 2023-07-02 RX ADMIN — TACROLIMUS 2 MG: 1 CAPSULE ORAL at 10:05

## 2023-07-02 RX ADMIN — INSULIN ASPART 3 UNITS: 100 INJECTION, SOLUTION INTRAVENOUS; SUBCUTANEOUS at 22:07

## 2023-07-02 RX ADMIN — ATORVASTATIN CALCIUM 20 MG: 10 TABLET, FILM COATED ORAL at 20:51

## 2023-07-02 RX ADMIN — NYSTATIN 500000 UNITS: 500000 SUSPENSION ORAL at 13:13

## 2023-07-02 RX ADMIN — NYSTATIN 500000 UNITS: 500000 SUSPENSION ORAL at 20:53

## 2023-07-02 RX ADMIN — ASPIRIN 325 MG: 325 TABLET, FILM COATED ORAL at 10:05

## 2023-07-02 RX ADMIN — TACROLIMUS 2 MG: 1 CAPSULE ORAL at 17:22

## 2023-07-02 RX ADMIN — URSODIOL 250 MG: 250 TABLET ORAL at 20:51

## 2023-07-02 RX ADMIN — BUMETANIDE 2 MG: 1 TABLET ORAL at 17:22

## 2023-07-02 RX ADMIN — MYCOPHENOLATE MOFETIL 750 MG: 250 CAPSULE ORAL at 10:06

## 2023-07-02 RX ADMIN — GABAPENTIN 100 MG: 100 CAPSULE ORAL at 20:52

## 2023-07-02 RX ADMIN — INSULIN ASPART 6 UNITS: 100 INJECTION, SOLUTION INTRAVENOUS; SUBCUTANEOUS at 17:23

## 2023-07-02 RX ADMIN — GABAPENTIN 100 MG: 100 CAPSULE ORAL at 13:13

## 2023-07-02 RX ADMIN — MAGNESIUM OXIDE TAB 400 MG (241.3 MG ELEMENTAL MG) 400 MG: 400 (241.3 MG) TAB at 20:52

## 2023-07-02 RX ADMIN — HEPARIN, PORCINE (PF) 10 UNIT/ML INTRAVENOUS SYRINGE 10 ML: at 10:06

## 2023-07-02 RX ADMIN — Medication 12.5 MG: at 06:03

## 2023-07-02 RX ADMIN — INSULIN ASPART 4 UNITS: 100 INJECTION, SOLUTION INTRAVENOUS; SUBCUTANEOUS at 17:24

## 2023-07-02 RX ADMIN — MYCOPHENOLATE MOFETIL 750 MG: 250 CAPSULE ORAL at 17:22

## 2023-07-02 RX ADMIN — INSULIN ASPART 3 UNITS: 100 INJECTION, SOLUTION INTRAVENOUS; SUBCUTANEOUS at 13:19

## 2023-07-02 ASSESSMENT — ACTIVITIES OF DAILY LIVING (ADL)
ADLS_ACUITY_SCORE: 38
ADLS_ACUITY_SCORE: 36
ADLS_ACUITY_SCORE: 38
ADLS_ACUITY_SCORE: 38
ADLS_ACUITY_SCORE: 36
ADLS_ACUITY_SCORE: 38
ADLS_ACUITY_SCORE: 38

## 2023-07-02 NOTE — PLAN OF CARE
Patient alert and oriented X4 slept through this shift. Denied any pain or discomfort. No SOB or chest pain voiced. X2 Assist with the Liko Lift. Utilizing the urinal without any concern. PICC line patent.  Continent of bowel and bladder. Call light within reach. Resting in bed with no acute distress noted. Continue per plan or care.

## 2023-07-02 NOTE — PLAN OF CARE
Patient is alert and oriented x4. Able to make needs known to staff. Patient is continent of both bowel and bladder. Voids spontaneously without difficulty. Transfers assist-2 with liko lift. Patient denied Chest pain, SOB, new onset cough and N&V. Diet: Regular diet with good appetite.  **LUE 2L PICC is intact, dressing changed, and heparinized.    Call-light within reach. Continue with POC.    Vital signs: T: 96.7,  B/P: 96/60,  P: 95,  R: 18,  SpO2: 97 %     Patient does not have new respiratory symptoms.  Patient does not have new sore throat.  Patient does not have a fever greater than 99.5.

## 2023-07-02 NOTE — PLAN OF CARE
Goal Outcome Evaluation:  Patient is on his recliner during the shift. On RN Manage Magnesium, no replacement today, Magnesium is 1.9. Phosphorus replaced  lab rechecked will be on Monday 7/3.On blood sugar checked with sliding scale insulin and carbohydrates counting.Ate dinner with good appetite.Doing arm strengthening exercises with rubber band.Liko lift with transfer. Continent of bowel and bladder.Call light with in reach. Continue with plan of care.        Patient's most recent vital signs are:     Vital signs:  BP: 100/66  Temp: 96.8  HR: 99  RR: 18  SpO2: 97 %     Patient does not have new respiratory symptoms.  Patient does not have new sore throat.  Patient does not have a fever greater than 99.5.

## 2023-07-03 ENCOUNTER — OFFICE VISIT (OUTPATIENT)
Dept: TRANSPLANT | Facility: CLINIC | Age: 68
End: 2023-07-03
Attending: TRANSPLANT SURGERY
Payer: COMMERCIAL

## 2023-07-03 ENCOUNTER — APPOINTMENT (OUTPATIENT)
Dept: PHYSICAL THERAPY | Facility: SKILLED NURSING FACILITY | Age: 68
End: 2023-07-03
Attending: INTERNAL MEDICINE
Payer: COMMERCIAL

## 2023-07-03 VITALS — SYSTOLIC BLOOD PRESSURE: 108 MMHG | HEART RATE: 86 BPM | OXYGEN SATURATION: 97 % | DIASTOLIC BLOOD PRESSURE: 72 MMHG

## 2023-07-03 DIAGNOSIS — D84.9 IMMUNOSUPPRESSED STATUS (H): Primary | ICD-10-CM

## 2023-07-03 LAB
ALBUMIN SERPL BCG-MCNC: 2.9 G/DL (ref 3.5–5.2)
ALP SERPL-CCNC: 270 U/L (ref 40–129)
ALT SERPL W P-5'-P-CCNC: 57 U/L (ref 0–70)
ANION GAP SERPL CALCULATED.3IONS-SCNC: 15 MMOL/L (ref 7–15)
AST SERPL W P-5'-P-CCNC: 32 U/L (ref 0–45)
BASOPHILS # BLD MANUAL: 0 10E3/UL (ref 0–0.2)
BASOPHILS NFR BLD MANUAL: 0 %
BILIRUB SERPL-MCNC: 0.6 MG/DL
BUN SERPL-MCNC: 58.5 MG/DL (ref 8–23)
CALCIUM SERPL-MCNC: 9.2 MG/DL (ref 8.8–10.2)
CHLORIDE SERPL-SCNC: 96 MMOL/L (ref 98–107)
CREAT SERPL-MCNC: 1.99 MG/DL (ref 0.67–1.17)
DEPRECATED HCO3 PLAS-SCNC: 24 MMOL/L (ref 22–29)
EOSINOPHIL # BLD MANUAL: 0.4 10E3/UL (ref 0–0.7)
EOSINOPHIL NFR BLD MANUAL: 2 %
ERYTHROCYTE [DISTWIDTH] IN BLOOD BY AUTOMATED COUNT: 18.5 % (ref 10–15)
GFR SERPL CREATININE-BSD FRML MDRD: 37 ML/MIN/1.73M2
GLUCOSE BLDC GLUCOMTR-MCNC: 100 MG/DL (ref 70–99)
GLUCOSE BLDC GLUCOMTR-MCNC: 199 MG/DL (ref 70–99)
GLUCOSE BLDC GLUCOMTR-MCNC: 212 MG/DL (ref 70–99)
GLUCOSE BLDC GLUCOMTR-MCNC: 258 MG/DL (ref 70–99)
GLUCOSE SERPL-MCNC: 112 MG/DL (ref 70–99)
HCT VFR BLD AUTO: 24.6 % (ref 40–53)
HGB BLD-MCNC: 7.7 G/DL (ref 13.3–17.7)
INR PPP: 2.36 (ref 0.85–1.15)
LYMPHOCYTES # BLD MANUAL: 0.4 10E3/UL (ref 0.8–5.3)
LYMPHOCYTES NFR BLD MANUAL: 2 %
MAGNESIUM SERPL-MCNC: 2.2 MG/DL (ref 1.7–2.3)
MCH RBC QN AUTO: 30.9 PG (ref 26.5–33)
MCHC RBC AUTO-ENTMCNC: 31.3 G/DL (ref 31.5–36.5)
MCV RBC AUTO: 99 FL (ref 78–100)
METAMYELOCYTES # BLD MANUAL: 0.6 10E3/UL
METAMYELOCYTES NFR BLD MANUAL: 3 %
MONOCYTES # BLD MANUAL: 1.5 10E3/UL (ref 0–1.3)
MONOCYTES NFR BLD MANUAL: 8 %
MYELOCYTES # BLD MANUAL: 1 10E3/UL
MYELOCYTES NFR BLD MANUAL: 5 %
NEUTROPHILS # BLD MANUAL: 14.4 10E3/UL (ref 1.6–8.3)
NEUTROPHILS NFR BLD MANUAL: 76 %
NEUTS HYPERSEG BLD QL SMEAR: PRESENT
NRBC # BLD AUTO: 0.4 10E3/UL
NRBC BLD MANUAL-RTO: 2 %
PHOSPHATE SERPL-MCNC: 2.7 MG/DL (ref 2.5–4.5)
PLAT MORPH BLD: ABNORMAL
PLATELET # BLD AUTO: 475 10E3/UL (ref 150–450)
POTASSIUM SERPL-SCNC: 3.8 MMOL/L (ref 3.4–5.3)
PROMYELOCYTES # BLD MANUAL: 0.8 10E3/UL
PROMYELOCYTES NFR BLD MANUAL: 4 %
PROT SERPL-MCNC: 5.7 G/DL (ref 6.4–8.3)
RBC # BLD AUTO: 2.49 10E6/UL (ref 4.4–5.9)
RBC MORPH BLD: ABNORMAL
SODIUM SERPL-SCNC: 135 MMOL/L (ref 136–145)
TACROLIMUS BLD-MCNC: 7.3 UG/L (ref 5–15)
TME LAST DOSE: NORMAL H
TME LAST DOSE: NORMAL H
WBC # BLD AUTO: 19 10E3/UL (ref 4–11)

## 2023-07-03 PROCEDURE — 250N000013 HC RX MED GY IP 250 OP 250 PS 637: Performed by: INTERNAL MEDICINE

## 2023-07-03 PROCEDURE — 99213 OFFICE O/P EST LOW 20 MIN: CPT | Performed by: TRANSPLANT SURGERY

## 2023-07-03 PROCEDURE — 250N000011 HC RX IP 250 OP 636: Performed by: INTERNAL MEDICINE

## 2023-07-03 PROCEDURE — 99214 OFFICE O/P EST MOD 30 MIN: CPT | Mod: 24 | Performed by: TRANSPLANT SURGERY

## 2023-07-03 PROCEDURE — 36415 COLL VENOUS BLD VENIPUNCTURE: CPT | Performed by: INTERNAL MEDICINE

## 2023-07-03 PROCEDURE — 85027 COMPLETE CBC AUTOMATED: CPT | Performed by: INTERNAL MEDICINE

## 2023-07-03 PROCEDURE — 80053 COMPREHEN METABOLIC PANEL: CPT | Performed by: INTERNAL MEDICINE

## 2023-07-03 PROCEDURE — 84100 ASSAY OF PHOSPHORUS: CPT | Performed by: INTERNAL MEDICINE

## 2023-07-03 PROCEDURE — 02PY33Z REMOVAL OF INFUSION DEVICE FROM GREAT VESSEL, PERCUTANEOUS APPROACH: ICD-10-PCS | Performed by: PHYSICIAN ASSISTANT

## 2023-07-03 PROCEDURE — 250N000012 HC RX MED GY IP 250 OP 636 PS 637: Performed by: INTERNAL MEDICINE

## 2023-07-03 PROCEDURE — 97530 THERAPEUTIC ACTIVITIES: CPT | Mod: GP

## 2023-07-03 PROCEDURE — 99310 SBSQ NF CARE HIGH MDM 45: CPT | Performed by: INTERNAL MEDICINE

## 2023-07-03 PROCEDURE — 80197 ASSAY OF TACROLIMUS: CPT | Performed by: INTERNAL MEDICINE

## 2023-07-03 PROCEDURE — 99207 PR CDG-CUT & PASTE-POTENTIAL IMPACT ON LEVEL: CPT | Performed by: INTERNAL MEDICINE

## 2023-07-03 PROCEDURE — 85007 BL SMEAR W/DIFF WBC COUNT: CPT | Performed by: INTERNAL MEDICINE

## 2023-07-03 PROCEDURE — 85610 PROTHROMBIN TIME: CPT | Performed by: INTERNAL MEDICINE

## 2023-07-03 PROCEDURE — 83735 ASSAY OF MAGNESIUM: CPT | Performed by: INTERNAL MEDICINE

## 2023-07-03 PROCEDURE — 120N000009 HC R&B SNF

## 2023-07-03 RX ORDER — LIDOCAINE 40 MG/G
CREAM TOPICAL
Status: DISCONTINUED | OUTPATIENT
Start: 2023-07-03 | End: 2023-07-04

## 2023-07-03 RX ORDER — METOLAZONE 5 MG/1
5 TABLET ORAL ONCE
Status: COMPLETED | OUTPATIENT
Start: 2023-07-03 | End: 2023-07-03

## 2023-07-03 RX ORDER — VALGANCICLOVIR 450 MG/1
450 TABLET, FILM COATED ORAL DAILY
Status: DISCONTINUED | OUTPATIENT
Start: 2023-07-03 | End: 2023-07-27 | Stop reason: HOSPADM

## 2023-07-03 RX ORDER — PREDNISONE 5 MG/1
5 TABLET ORAL DAILY
Status: DISCONTINUED | OUTPATIENT
Start: 2023-07-04 | End: 2023-07-05

## 2023-07-03 RX ORDER — PREGABALIN 25 MG/1
25 CAPSULE ORAL 3 TIMES DAILY
Status: DISCONTINUED | OUTPATIENT
Start: 2023-07-03 | End: 2023-07-11

## 2023-07-03 RX ADMIN — ALLOPURINOL 100 MG: 100 TABLET ORAL at 08:39

## 2023-07-03 RX ADMIN — INSULIN ASPART 4 UNITS: 100 INJECTION, SOLUTION INTRAVENOUS; SUBCUTANEOUS at 08:44

## 2023-07-03 RX ADMIN — MYCOPHENOLATE MOFETIL 750 MG: 250 CAPSULE ORAL at 08:39

## 2023-07-03 RX ADMIN — INSULIN ASPART 3 UNITS: 100 INJECTION, SOLUTION INTRAVENOUS; SUBCUTANEOUS at 18:06

## 2023-07-03 RX ADMIN — MYCOPHENOLATE MOFETIL 750 MG: 250 CAPSULE ORAL at 18:06

## 2023-07-03 RX ADMIN — NYSTATIN 500000 UNITS: 500000 SUSPENSION ORAL at 18:06

## 2023-07-03 RX ADMIN — VALGANCICLOVIR HYDROCHLORIDE 450 MG: 450 TABLET ORAL at 21:52

## 2023-07-03 RX ADMIN — BUMETANIDE 2 MG: 1 TABLET ORAL at 08:40

## 2023-07-03 RX ADMIN — WARFARIN SODIUM 1.25 MG: 2.5 TABLET ORAL at 18:06

## 2023-07-03 RX ADMIN — Medication 12.5 MG: at 14:27

## 2023-07-03 RX ADMIN — PREGABALIN 25 MG: 25 CAPSULE ORAL at 14:27

## 2023-07-03 RX ADMIN — TACROLIMUS 2 MG: 1 CAPSULE ORAL at 18:06

## 2023-07-03 RX ADMIN — BUMETANIDE 2 MG: 1 TABLET ORAL at 16:16

## 2023-07-03 RX ADMIN — Medication 12.5 MG: at 05:19

## 2023-07-03 RX ADMIN — URSODIOL 250 MG: 250 TABLET ORAL at 08:40

## 2023-07-03 RX ADMIN — NYSTATIN 500000 UNITS: 500000 SUSPENSION ORAL at 08:39

## 2023-07-03 RX ADMIN — INSULIN ASPART 5 UNITS: 100 INJECTION, SOLUTION INTRAVENOUS; SUBCUTANEOUS at 18:08

## 2023-07-03 RX ADMIN — ATORVASTATIN CALCIUM 20 MG: 10 TABLET, FILM COATED ORAL at 21:52

## 2023-07-03 RX ADMIN — NYSTATIN 500000 UNITS: 500000 SUSPENSION ORAL at 12:33

## 2023-07-03 RX ADMIN — MAGNESIUM OXIDE TAB 400 MG (241.3 MG ELEMENTAL MG) 400 MG: 400 (241.3 MG) TAB at 21:52

## 2023-07-03 RX ADMIN — ASPIRIN 325 MG: 325 TABLET, FILM COATED ORAL at 08:39

## 2023-07-03 RX ADMIN — TACROLIMUS 2 MG: 1 CAPSULE ORAL at 08:39

## 2023-07-03 RX ADMIN — PREDNISONE 10 MG: 5 TABLET ORAL at 08:39

## 2023-07-03 RX ADMIN — B-COMPLEX W/ C & FOLIC ACID TAB 1 MG 1 TABLET: 1 TAB at 11:42

## 2023-07-03 RX ADMIN — PANTOPRAZOLE SODIUM 40 MG: 40 TABLET, DELAYED RELEASE ORAL at 05:18

## 2023-07-03 RX ADMIN — METOLAZONE 5 MG: 5 TABLET ORAL at 14:51

## 2023-07-03 RX ADMIN — GABAPENTIN 100 MG: 100 CAPSULE ORAL at 08:39

## 2023-07-03 RX ADMIN — URSODIOL 250 MG: 250 TABLET ORAL at 21:52

## 2023-07-03 RX ADMIN — NYSTATIN 500000 UNITS: 500000 SUSPENSION ORAL at 21:52

## 2023-07-03 RX ADMIN — INSULIN ASPART 3 UNITS: 100 INJECTION, SOLUTION INTRAVENOUS; SUBCUTANEOUS at 21:52

## 2023-07-03 RX ADMIN — HEPARIN, PORCINE (PF) 10 UNIT/ML INTRAVENOUS SYRINGE 10 ML: at 11:42

## 2023-07-03 RX ADMIN — INSULIN ASPART 3 UNITS: 100 INJECTION, SOLUTION INTRAVENOUS; SUBCUTANEOUS at 12:32

## 2023-07-03 RX ADMIN — SULFAMETHOXAZOLE AND TRIMETHOPRIM 1 TABLET: 400; 80 TABLET ORAL at 08:39

## 2023-07-03 RX ADMIN — MAGNESIUM OXIDE TAB 400 MG (241.3 MG ELEMENTAL MG) 400 MG: 400 (241.3 MG) TAB at 11:42

## 2023-07-03 RX ADMIN — PREGABALIN 25 MG: 25 CAPSULE ORAL at 21:52

## 2023-07-03 ASSESSMENT — ACTIVITIES OF DAILY LIVING (ADL)
ADLS_ACUITY_SCORE: 36

## 2023-07-03 NOTE — PROGRESS NOTES
Transplant Surgery Immunosuppression Management Note:  Damion Quinones is a 67 year old male with history of cirrhosis (alcohol + hemochromatosis), ESKD (IgA nephropathy + ANCA vasculitis), PAF, obesity, HTN, pre-diabetes, rheumatoid and psoriatic arthritis, and CAD. S/p simultaneous liver and kidney transplant on 6/6/2023 complicated by DGF, AF RVR, shock, gout flare, right internal jugular clot, and malnutrition.      POD # 27    Liver transplant w/ complex reconstruction of accessory right hepatic artery: LFTs WNL. AP remains elevated 334->270. No biliary stent.    Kidney transplant w/ stent, delayed graft function:   Cr 2.2->2.0. 6/20 biopsy: ATN, no rejection.     Immunosuppression:  Induction: Steroid taper and Thymoglobulin 400 mg (4 mg/kg).   Maintenance:   -  mg BID  - Tacrolimus goal level 5-6 per Dr. Clifton today.  - Prednisone 5mg daily per Dr. Clifton (reduced dose).  Infection prophylaxis: PJP (bactrim), viral (Valcyte x 12 weeks)    Transplant coordinator: Cindy Clay (Liver), Angelita Torres (Kidney) [phone]  Donor type: DBD  DSA at time of transplant:  Yes CW9 6/5/23  Ureteral stent: Yes  Biliary stent: No    Kim Poe NP  [phone]

## 2023-07-03 NOTE — PLAN OF CARE
Goal Outcome Evaluation:         VS: /62   Pulse 114   Temp 98.1  F (36.7  C) (Oral)   Resp 16   Wt 95.8 kg (211 lb 3.2 oz)   SpO2 97%   BMI 33.08 kg/m     O2: RA   Output: Voids spontaneously to handheld urinal   Activity: Liko lift   Skin: Jimmie and thin   Pain: Denies   CMS: Intact, AOx4. Denies numbness and tingling.   Dressing: Open to air, staples in place   Diet: Regular diet. Pills whole   LDA: 2 lumen PICC, Right cvc   Equipment: IV pole, personal belongings, liko lift and green sheet, wheelchair, recliner   Plan: Continue POC   Additional Info:

## 2023-07-03 NOTE — LETTER
7/3/2023         RE: Damion Quinones  [address] [facility] [address]        Dear Colleague,    Thank you for referring your patient, Damion Quinones, to the [facility]. Please see a copy of my visit note below.    Transplant Surgery -OUTPATIENT IMMUNOSUPPRESSION PROGRESS NOTE    Date of Visit: 07/03/2023    Transplants:  6/6/2023 (Liver), 6/6/2023 (Kidney); Postoperative day:  27 (Liver), 27 (Kidney)  ASSESMENT AND PLAN:  1.Graft Function: Liver allograft: no rejection or technical problems.  Mild increase in alkaline phosphatase, will continue to monitor,   Kidney allograft: no rejection or technical problems;   Creatinine is improving  2.Immunosuppression Management: keep tacrolimus levels 5-6 ng/dL, continue cellcept  And keep prednisone at 5 mg po daily  3.Hypertension: ok  4.Renal Function: improving  5.Lab frequency: twice weekly  6.Other:  1. Remove staples, dialysis line and replace PICC line  2. Neurology consult for neuropathy and consider lyrica  3. Add metalazone 5 mg po daily  4. Continue nutritional support and physcial therapy    Date: July 3, 2023    Transplant:  [x]                             Liver [x]                              Kidney []                             Pancreas []                              Other:             Chief Complaint:  Weak unable to walk due to neuropathy and has ascites      History of Present Illness:  Patient Active Problem List   Diagnosis     Alcoholic cirrhosis of liver with ascites (H)     Psoriatic arthritis (H)     PAF (paroxysmal atrial fibrillation) (H)     End-stage liver disease (H)     Leukocytosis, unspecified type     ESRD (end stage renal disease) on dialysis (H)     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)     Esophageal varices in cirrhosis (H)     Essential hypertension     Family history of colon cancer     Family history of prostate cancer     GAVE (gastric  antral vascular ectasia)     Hereditary hemochromatosis (H)     Other hyperlipidemia     Psoriasis     S/P TIPS (transjugular intrahepatic portosystemic shunt)     Tophaceous gout     Deep vein thrombosis (DVT) of non-extremity vein, unspecified chronicity     Moderate protein-calorie malnutrition (H)     Alcohol use, unspecified with unspecified alcohol-induced disorder (H)     Acute deep vein thrombosis (DVT) of right lower extremity, unspecified vein (H)     Encephalopathy     Pre-operative cardiovascular examination     CKD (chronic kidney disease) stage 5, GFR less than 15 ml/min (H)     Status post coronary angiogram     Recurrent Clostridioides difficile diarrhea     Chronic atrial fibrillation (H)     Physical deconditioning     Liver transplant recipient (H)     Bacteremia due to Enterococcus     Administration of long-term prophylactic antibiotics     Immunosuppressed status (H)     Ileus, postoperative (H)     Atrial fibrillation with rapid ventricular response (H)     Delayed graft function of kidney     Hypotension     Kidney transplant recipient     Acute post-operative pain     CAD (coronary artery disease)     Severe malnutrition (H)     Steroid-induced hyperglycemia     Muscular deconditioning     SOCIAL /FAMILY HISTORY: [x]                  No recent change    Past Medical History:   Diagnosis Date     Alcoholic cirrhosis of liver with ascites (H) 10/11/2019     ANCA-associated vasculitis (H) 2022     C. difficile colitis      Coronary artery disease     [facility] 4/2023 - complete occlusion of RCA     ESRD (end stage renal disease) on dialysis (H)      Gout      History of hemochromatosis 10/11/2019     Hypertension      IgA nephropathy      Obesity      PAF (paroxysmal atrial fibrillation) (H)      Pre-diabetes 2023     Psoriatic arthritis (H)      Psoriatic arthritis (H)      RA (rheumatoid arthritis) (H)      Status post kidney transplant 06/06/2023    Induction with thymoglobulin 4mg/kg, + DSA CW9      Status post liver transplantation (H) 2023     Past Surgical History:   Procedure Laterality Date     APPENDECTOMY      Removed at 18 Years Old      BENCH KIDNEY  2023    Procedure: Bench kidney;  Surgeon: Chad Clifton MD;  Location:  OR     BENCH LIVER  2023    Procedure: Bench liver;  Surgeon: Chad Clifton MD;  Location:  OR     COLONOSCOPY      2014 at [facility].      CV CORONARY ANGIOGRAM N/A 2023    Procedure: Coronary Angiogram;  Surgeon: Pablo Araujo MD;  Location:  HEART CARDIAC CATH LAB     EYE SURGERY Bilateral     Cataract     H STATISTIC PICC LINE INSERTION >5YR, FAILED Left 2023    Unable to advance catheter over the axillary area     HERNIA REPAIR      History of bilateral inguinal hernia repair: 10/28/2014. Open hernia repair: 10/2017. Abdominal wound exploration and debridement 2017     INSERT SHUNT PORTAL TRANSJUGULAR INTRAHEPTIC  2022     IR CVC TUNNEL PLACEMENT > 5 YRS OF AGE  2023     IR PICC PLACEMENT > 5 YRS OF AGE  2023     IR RENAL BIOPSY RIGHT  2023     RETURN LIVER TRANSPLANT N/A 2023    Procedure: Return liver transplant. Intra-operative ultrasound;  Surgeon: Chad Clifton MD;  Location: [facility]     TRANSPLANT KIDNEY RECIPIENT  DONOR N/A 2023    Procedure: Transplant kidney recipient  donor, ureteral stent placement;  Surgeon: Chad Clifton MD;  Location:  OR     TRANSPLANT LIVER RECIPIENT  DONOR N/A 2023    Procedure: Transplant liver recipient  donor;  Surgeon: Chad Clifton MD;  Location:  OR     Social History     Socioeconomic History     Marital status:      Spouse name: Not on file     Number of children: Not on file     Years of education: Not on file     Highest education level: Not on file   Occupational History     Not on file   Tobacco Use     Smoking status: Never     Passive exposure:  Never     Smokeless tobacco: Never   Vaping Use     Vaping Use: Never used   Substance and Sexual Activity     Alcohol use: Not Currently     Comment: Last ETOH use was 2021     Drug use: Not Currently     Sexual activity: Not on file   Other Topics Concern     Parent/sibling w/ CABG, MI or angioplasty before 65F 55M? Not Asked   Social History Narrative     Not on file     Social Determinants of Health     Financial Resource Strain: Not on file   Food Insecurity: Not on file   Transportation Needs: Not on file   Physical Activity: Not on file   Stress: Not on file   Social Connections: Not on file   Intimate Partner Violence: Not on file   Housing Stability: Not on file     Prescription Medications as of 7/3/2023       Rx Number Disp Refills Start End Last Dispensed Date Next Fill Date Owning Pharmacy    albuterol (PROAIR HFA/PROVENTIL HFA/VENTOLIN HFA) 108 (90 Base) MCG/ACT inhaler  18 g  2023        Sig: Inhale 2 puffs into the lungs every 6 hours as needed for wheezing    Class: Transitional    Notes to Pharmacy: Pharmacy may dispense brand covered by insurance (Proair, or proventil or ventolin or generic albuterol inhaler)    Route: Inhalation    allopurinol (ZYLOPRIM) 100 MG tablet    2023        Sig: Take 1 tablet (100 mg) by mouth daily    Class: Transitional    Route: Oral    aspirin (ASA) 325 MG tablet    2023        Si tablet (325 mg) by Oral or Feeding Tube route daily    Class: Transitional    Route: Oral or Feeding Tube    atorvastatin (LIPITOR) 20 MG tablet    2023        Si tablet (20 mg) by Oral or Feeding Tube route every evening    Class: Transitional    Route: Oral or Feeding Tube    bisacodyl (DULCOLAX) 10 MG suppository    2023        Sig: Place 1 suppository (10 mg) rectally daily as needed for constipation    Class: Transitional    Route: Rectal    bumetanide (BUMEX) 2 MG tablet    2023        Sig: Take 1 tablet (2 mg) by mouth 2 times daily     Class: Transitional    Route: Oral    diclofenac (VOLTAREN) 1 % topical gel    2023        Sig: Apply 4 g topically 3 times daily    Class: Transitional    Route: Topical    insulin aspart (NOVOLOG PEN) 100 UNIT/ML pen  15 mL  2023        Sig: Inject 1-12 Units Subcutaneous every 4 hours    Class: Transitional    Route: Subcutaneous    insulin aspart (NOVOLOG PEN) 100 UNIT/ML pen  15 mL  2023        Sig: Inject 1-7 Units Subcutaneous 3 times daily (with meals) DOSE:  1 units per 15 grams of carbohydrate.  Only chart total amount of units given.  Do not give if pre-prandial glucose is less than 60 mg/dL.  If given at mealtime, administer within 30 minutes of start of meal.    Class: Transitional    Route: Subcutaneous    insulin aspart (NOVOLOG PEN) 100 UNIT/ML pen  15 mL  2023        Sig: Inject 1-7 Units Subcutaneous Take with snacks or supplements for high blood sugar    Class: Transitional    Route: Subcutaneous    insulin glargine (LANTUS PEN) 100 UNIT/ML pen  15 mL  2023        Sig: Inject 10 Units Subcutaneous At Bedtime    Class: Transitional    Notes to Pharmacy: If Lantus is not covered by insurance, may substitute Basaglar or Semglee or other insulin glargine product per insurance preference at same dose and frequency.      Route: Subcutaneous    magnesium oxide (MAG-OX) 400 MG tablet    2023        Sig: Take 1 tablet (400 mg) by mouth daily    Class: Transitional    Route: Oral    methocarbamol (ROBAXIN) 500 MG tablet    2023        Si tablet (500 mg) by Oral or Feeding Tube route 4 times daily as needed for muscle spasms    Class: Transitional    Route: Oral or Feeding Tube    metoprolol tartrate (LOPRESSOR) 25 MG tablet    2023        Si.5 tablets (12.5 mg) by Oral or Feeding Tube route every 8 hours    Class: Transitional    Route: Oral or Feeding Tube    multivitamin RENAL (RENAVITE RX/NEPHROVITE) 1 tablet tablet    2023        Si tablet by Oral  or Feeding Tube route daily    Class: Transitional    Route: Oral or Feeding Tube    mycophenolate (GENERIC EQUIVALENT) 250 MG capsule    2023        Sig: Take 3 capsules (750 mg) by mouth 2 times daily    Class: Transitional    Route: Oral    nystatin (MYCOSTATIN) 255092 UNIT/ML suspension    2023        Sig: Take 5 mLs (500,000 Units) by mouth 4 times daily    Class: Transitional    Route: Oral    ondansetron (ZOFRAN ODT) 4 MG ODT tab    2023        Sig: Take 1 tablet (4 mg) by mouth every 6 hours as needed for nausea or vomiting    Class: Transitional    Route: Oral    oxyCODONE (ROXICODONE) 5 MG tablet   0 2023        Si.5 tablets (2.5 mg) by Oral or Feeding Tube route every 4 hours as needed for moderate pain    Class: Transitional    Earliest Fill Date: 2023    Route: Oral or Feeding Tube    pantoprazole (PROTONIX) 40 MG EC tablet    2023        Sig: Take 1 tablet (40 mg) by mouth every morning (before breakfast)    Class: Transitional    Route: Oral    polyethylene glycol (MIRALAX) 17 GM/Dose powder  510 g  2023        Sig: Take 17 g by mouth daily    Class: Transitional    Route: Oral    predniSONE (DELTASONE) 10 MG tablet    2023        Sig: Take 3 tablets (30 mg) by mouth daily    Class: Transitional    Route: Oral    predniSONE (DELTASONE) 10 MG tablet    2023        Sig: Take 3 tabs by mouth on , then 2 tabs daily x 3 days (-).    Class: Transitional    predniSONE (DELTASONE) 10 MG tablet    2023        Si tablet (10 mg) by Per Feeding Tube route daily    Class: Transitional    Route: Per Feeding Tube    simethicone (MYLICON) 80 MG chewable tablet    2023        Si tablet (80 mg) by Oral or Feeding Tube route every 6 hours as needed for cramping    Class: Transitional    Route: Oral or Feeding Tube    sulfamethoxazole-trimethoprim (BACTRIM) 400-80 MG tablet    2023        Si tablet by Oral or Feeding Tube route three  times a week    Class: Transitional    Route: Oral or Feeding Tube    tacrolimus (GENERIC EQUIVALENT) 1 MG capsule    6/25/2023        Sig: Take 2 capsules (2 mg) by mouth 2 times daily    Class: Transitional    Route: Oral    ursodiol (ACTIGALL) 250 MG tablet    6/25/2023        Sig: Take 1 tablet (250 mg) by mouth 2 times daily    Class: Transitional    Route: Oral    valGANciclovir (VALCYTE) 450 MG tablet    6/26/2023        Sig: Take 1 tablet (450 mg) by mouth every other day    Class: Transitional    Notes to Pharmacy: Titrate to 900 mg daily dose. CMV PPX (SLK).    Route: Oral      Hospital Medications as of 7/3/2023       Dose Frequency Start End    - Skin Test Reading -  EVERY 21 DAYS 6/28/2023 8/9/2023    Admin Instructions: For tuberculosis diagnostic:  Adjust reading time if needed based on time of administration.  Reading must occur between 48-72 hours after administration.    Class: E-Prescribe    Route: Does not apply    acetaminophen (TYLENOL) Suppository 650 mg 650 mg EVERY 6 HOURS PRN 6/28/2023     Admin Instructions: Maximum acetaminophen dose from all sources = 75 mg/kg/day not to exceed 4 grams/day.    Class: E-Prescribe    Route: Rectal    acetaminophen (TYLENOL) tablet 650 mg 650 mg EVERY 4 HOURS PRN 6/25/2023     Admin Instructions: Maximum acetaminophen dose from all sources = 75 mg/kg/day not to exceed 4 grams/day.    Class: E-Prescribe    Route: Oral    albuterol (PROVENTIL HFA/VENTOLIN HFA) inhaler 2 puff EVERY 6 HOURS PRN 6/25/2023     Admin Instructions: Check the dose counter on the inhaler to ensure there are doses remaining before administering. Prime by spraying into the air 4 times prior to first use and if not used within 2 weeks.    Class: E-Prescribe    Notes to Pharmacy: PTA Sig:Inhale 2 puffs into the lungs every 6 hours as needed for wheezing      Route: Inhalation    allopurinol (ZYLOPRIM) tablet 100 mg 100 mg DAILY 6/26/2023     Admin Instructions: Indication: gout    Class:  E-Prescribe    Notes to Pharmacy: PTA Sig:Take 1 tablet (100 mg) by mouth daily      Route: Oral    alteplase (CATHFLO ACTIVASE) injection 2 mg (Completed) 2 mg EVERY 2 HOURS 2023    Admin Instructions: For Central Venous Catheter  Use 10 mL syringe to draw up 2 ML and instill into clotted catheter & allow to dwell for 30 mins, then DRAW BACK and discard contents to assess catheter function.  If still occluded, allow mixture to dwell for an additional 90 mins, then DRAW BACK and discard contents to reassess catheter function.  May repeat dose once if occlusion persists.  Contact provider if 2nd dose is unsuccessful.  Only use a 10 ml syringe to administer the solution into each lumen. For catheters with lumen volumes greater than the volume dispensed, request additional syringe(s) from pharmacy.  To prevent inadvertent embolization of thrombus from the catheter, ALWAYS WITHDRAW alteplase (ACTIVASE) at the end of the dwell time before administering any solution through the catheter.    Class: E-Prescribe    Route: Intravenous    aspirin (ASA) tablet 325 mg 325 mg DAILY 2023     Admin Instructions: Indication: CAD    Class: E-Prescribe    Notes to Pharmacy: PTA Si tablet (325 mg) by Oral or Feeding Tube route daily      Route: Oral or Feeding Tube    atorvastatin (LIPITOR) tablet 20 mg 20 mg EVERY EVENING 2023     Admin Instructions: Indication: CAD    Class: E-Prescribe    Notes to Pharmacy: PTA Si tablet (20 mg) by Oral or Feeding Tube route every evening      Route: Oral or Feeding Tube    bisacodyl (DULCOLAX) suppository 10 mg 10 mg DAILY PRN 2023     Admin Instructions: Hold for loose stools.    Class: E-Prescribe    Notes to Pharmacy: PTA Sig:Place 1 suppository (10 mg) rectally daily as needed for constipation      Route: Rectal    bumetanide (BUMEX) tablet 2 mg 2 mg 2 TIMES DAILY 2023     Admin Instructions: Indication: diuresis  Schedule 0800 and 1600    Hold for sbp<100     Class: E-Prescribe    Notes to Pharmacy: PTA Sig:Take 1 tablet (2 mg) by mouth 2 times daily      Route: Oral    dextrose 50 % injection 25-50 mL 25-50 mL EVERY 15 MIN PRN 6/25/2023     Admin Instructions: Use if have IV access, BG less than 70 mg/dL and meet dose criteria below:  Dose if conscious and alert (or disorientated) and NPO = 25 mL  Dose if unconscious / not alert = 50 mL    Give first dose for initial blood glucose less than 70  mg/dL.  If blood glucose at 15 minute recheck is less than or equal to 100 mg/dL continue to administer carbohydrate treatment every 15 minutes, as needed, based on blood glucose and assessment parameters until blood glucose level is above 100 mg/dL.  Vesicant.    Class: E-Prescribe    Route: Intravenous    Linked Group 1: See Hyperspace for full Linked Orders Report.        diclofenac (VOLTAREN) 1 % topical gel 4 g 4 g 3 TIMES DAILY 6/25/2023     Admin Instructions: Indication: pain    Apply to affected area.   Send dosing card with product.    Class: E-Prescribe    Notes to Pharmacy: PTA Sig:Apply 4 g topically 3 times daily      Route: Topical    gabapentin (NEURONTIN) capsule 100 mg 100 mg 3 TIMES DAILY 6/28/2023     Class: E-Prescribe    Route: Oral    glucagon injection 1 mg 1 mg EVERY 15 MIN PRN 6/25/2023     Admin Instructions: May give SQ or IM. ONLY use glucagon IF patient has NO IV access AND is UNABLE to swallow AND blood glucose is LESS than or EQUAL to 50 mg/dL.    Class: E-Prescribe    Route: Subcutaneous    Linked Group 1: See Hyperspace for full Linked Orders Report.        glucose gel 15-30 g 15-30 g EVERY 15 MIN PRN 6/25/2023     Admin Instructions: Give first dose for initial blood glucose less than 70 mg/dL per the dosing instructions below.   If blood glucose at 15 minute rechecks is still less than or equal to 100 mg/dL, continue to administer doses per blood glucose parameters every 15 minutes, as needed, until blood glucose level is above 100  mg/dL.    Dosing Instructions:  ~If patient is conscious and able to swallow and NO enteral tube  For initial BG 51-69mg/dL OR 15 minute recheck BG 51- 100 mg/dL - give 15 g  For BG less than or equal to 50 mg/dL - give 30 g  ~ If Enteral tube  For initial BG 51-69mg/dL OR 15 minute recheck BG 51- 100 mg/dL - give apple juice 120 mL (4 oz or 15 g of CHO) via enteral tube  For BG less than or equal to 50 mg/dL - Give apple juice 240 mL (8 oz or 30 g of CHO) via enteral tube  ~Oral gel is preferable for conscious and able to swallow patient.   ~IF gel unavailable or patient refuses may provide apple juice per Enteral tube dosing instructions.  Document juice on I and O flowsheet.    Class: E-Prescribe    Route: Oral    Linked Group 1: See Hyperspace for full Linked Orders Report.        heparin lock flush 10 UNIT/ML injection 5-20 mL 5-20 mL EVERY 24 HOURS 6/28/2023     Admin Instructions: To lock each CVC Open Ended (Tunneled and Non-Tunneled) dormant lumen.   MAX dose: 5 mL for each lumen  Check PRN heparin flush order to see when the last dose of the PRN heparin was given before administering this heparin dose.   To lock the lumens: Flush with 10 mL sodium chloride 0.9% for each lumen to ensure patency and then flush each dormant lumen with 5 mL of heparin to lock the dormant line.    Class: E-Prescribe    Route: Intracatheter    heparin lock flush 10 UNIT/ML injection 5-20 mL 5-20 mL EVERY 1 HOUR PRN 6/28/2023     Admin Instructions: MAX dose: 5 mL for each lumen  Check PRN heparin flush order to see when the last dose of the PRN heparin was given before administering this heparin dose.   To lock the lumens: Flush with 10 mL sodium chloride 0.9% for each lumen to ensure patency and then flush each dormant lumen with 5 mL of heparin to lock the dormant line.    Class: E-Prescribe    Route: Intracatheter    insulin aspart (NovoLOG) injection (RAPID ACTING)  DAILY WITH BREAKFAST 6/26/2023     Admin Instructions:  DOSE:  1 units per 15 grams of carbohydrate.  Only chart total amount of units given.  Do not give if pre-prandial glucose is less than 60 mg/dL.    Indication: DM2  If given at mealtime, administer within 30 minutes of start of meal.    Class: E-Prescribe    Route: Subcutaneous    insulin aspart (NovoLOG) injection (RAPID ACTING)  DAILY WITH LUNCH 6/26/2023     Admin Instructions: DOSE:  1 units per 15 grams of carbohydrate. Only chart total amount of units given.  Do not give if pre-prandial glucose is less than 60 mg/dL.  Indication: DM2  If given at mealtime, administer within 30 minutes of start of meal.    Class: E-Prescribe    Route: Subcutaneous    insulin aspart (NovoLOG) injection (RAPID ACTING)  DAILY WITH SUPPER 6/25/2023     Admin Instructions: DOSE:  1 units per 15 grams of carbohydrate. Only chart total amount of units given.  Do not give if pre-prandial glucose is less than 60 mg/dL.  Indication: DM2  If given at mealtime, administer within 30 minutes of start of meal.    Class: E-Prescribe    Route: Subcutaneous    insulin aspart (NovoLOG) injection (RAPID ACTING) 1-10 Units 3 TIMES DAILY BEFORE MEALS 6/25/2023     Admin Instructions: Correction Scale - HIGH INSULIN RESISTANCE DOSING     Do Not give Correction Insulin if Pre-Meal BG less than 140.   For Pre-Meal  - 164 give 1 unit.   For Pre-Meal  - 189 give 2 units.   For Pre-Meal  - 214 give 3 units.   For Pre-Meal  - 239 give 4 units.   For Pre-Meal  - 264 give 5 units.   For Pre-Meal  - 289 give 6 units.   For Pre-Meal  - 314 give 7 units.   For Pre-Meal  - 339 give 8 units.   For Pre-Meal  - 364 give 9 units.   For Pre-Meal BG greater than or equal to 365 give 10 units  To be given with prandial insulin, and based on pre-meal blood glucose.   Notify provider if glucose greater than or equal to 350 mg/dL after administration of correction dose.  Indication: DM2  If given at mealtime,  administer within 30 minutes of start of meal.    Class: E-Prescribe    Route: Subcutaneous    insulin aspart (NovoLOG) injection (RAPID ACTING) 1-7 Units AT BEDTIME 2023     Admin Instructions: HIGH INSULIN RESISTANCE DOSING    Do Not give Bedtime Correction Insulin if BG less than 200.   For  - 224 give 1 units.   For  - 249 give 2 units.   For  - 274 give 3 units.   For  - 299 give 4 units.   For  - 324 give 5 units.   For  - 349 give 6 units.   For BG greater than or equal to 350 give 7 units.   Notify provider if glucose greater than or equal to 350 mg/dL after administration of correction dose.  Indication: DM2  If given at mealtime, administer within 30 minutes of start of meal.    Class: E-Prescribe    Route: Subcutaneous    insulin glargine (LANTUS PEN) injection 15 Units 15 Units AT BEDTIME 2023     Admin Instructions: Indication: DM2    Class: E-Prescribe    Notes to Pharmacy: PTA Sig:Inject 10 Units Subcutaneous At Bedtime      Route: Subcutaneous    magnesium oxide (MAG-OX) tablet 400 mg 400 mg 2 TIMES DAILY 2023     Class: E-Prescribe    Notes to Pharmacy: PTA Sig:Take 1 tablet (400 mg) by mouth daily      Route: Oral    methocarbamol (ROBAXIN) tablet 500 mg 500 mg 4 TIMES DAILY PRN 2023     Class: E-Prescribe    Notes to Pharmacy: PTA Si tablet (500 mg) by Oral or Feeding Tube route 4 times daily as needed for muscle spasms      Route: Oral or Feeding Tube    metoprolol tartrate (LOPRESSOR) half-tab 12.5 mg 12.5 mg EVERY 8 HOURS 2023     Admin Instructions: Hold for SBP < 105 mm hg or HR < 60/min    Class: E-Prescribe    Notes to Pharmacy: PTA Si.5 tablets (12.5 mg) by Oral or Feeding Tube route every 8 hours      Route: Oral or Feeding Tube    multivitamin RENAL (RENAVITE RX/NEPHROVITE) tablet 1 tablet 1 tablet DAILY 2023     Class: E-Prescribe    Notes to Pharmacy: PTA Si tablet by Oral or Feeding Tube route daily      Route:  Oral or Feeding Tube    mycophenolate (GENERIC EQUIVALENT) capsule 750 mg 750 mg 2 TIMES DAILY 6/25/2023     Admin Instructions: Indication: immunosuppression    Do not open capsule for use down feeding tube. Check if DRUG LEVEL is needed BEFORE administering dose.  This order specifically allows the use of GENERIC mycophenolate as an equivalent of CELLCEPT capsules.    When possible, take 1 to 2 hours apart from any product containing magnesium or aluminum.    Class: E-Prescribe    Notes to Pharmacy: PTA Sig:Take 3 capsules (750 mg) by mouth 2 times daily      Route: Oral    naloxone (NARCAN) injection 0.2 mg 0.2 mg EVERY 2 MIN PRN 6/25/2023     Admin Instructions: Administer intravenous route when available and notify provider when administered.  For unintended sedation or respiratory depression if all of the below criteria are met:  ~ respiratory rate LESS than or EQUAL to 8.   ~SaO2 less than 92% and or/end-tidal CO2 is greater than 50.  ~ the patient is receiving an opioid, has unintended sedations assessed as RASS (-3), and is currently not on mechanical ventilation.  RASS scale moderate (-3) is movement or eye opening to voice but no eye contact.    Patient Monitoring  Once the patient has demonstrated a response to the naloxone, continue to monitor respiratory rate, depth, oxygen saturation and end-tidal CO2 (if available) every 15 minutes x 2, then every 30 minutes x 2, then every 1 hour x 1 after each naloxone dose.  Consider transfer to ICU if patient respiratory parameters have not improved after 4 naloxone doses.    Class: E-Prescribe    Route: Intravenous    Linked Group 2: See [facility] for full Linked Orders Report.        naloxone (NARCAN) injection 0.2 mg 0.2 mg EVERY 2 MIN PRN 6/25/2023     Admin Instructions: Administer intramuscular if an intravenous route is not available and notify provider when administered.  For unintended sedation or respiratory depression if all of the below criteria are  met:  ~ respiratory rate LESS than or EQUAL to 8.   ~SaO2 less than 92% and or/end-tidal CO2 is greater than 50.  ~ the patient is receiving an opioid, has unintended sedations assessed as RASS (-3), and is currently not on mechanical ventilation.  RASS scale moderate (-3) is movement or eye opening to voice but no eye contact.    Patient Monitoring  Once the patient has demonstrated a response to the naloxone, continue to monitor respiratory rate, depth, oxygen saturation and end-tidal CO2 (if available) every 15 minutes x 2, then every 30 minutes x 2, then every 1 hour x 1 after each naloxone dose.  Consider transfer to ICU if patient respiratory parameters have not improved after 4 naloxone doses.    Class: E-Prescribe    Route: Intramuscular    Linked Group 2: See [facility] for full Linked Orders Report.        naloxone (NARCAN) injection 0.4 mg 0.4 mg EVERY 2 MIN PRN 6/25/2023     Admin Instructions: Administer intravenous route when available and notify provider when administered.  For unintended sedation or respiratory depression if all of the below criteria are met:  ~ respiratory rate LESS than or EQUAL to 8.  ~ SaO2 less than 92% and or/end-tidal CO2 is greater than 50.  ~ the patient is receiving an opioid, has unintended sedation assessed as RASS (-4) or (-5) and patient is currently not on mechanical ventilation.  RASS scale (-4) is deep sedation with no response to voice but movement or eye opening to physical stimulation.  RASS scale (-5) is unarousable.      Patient Monitoring  Once the patient has demonstrated a response to the naloxone, continue to monitor respiratory rate, depth, oxygen saturation and end-tidal CO2 (if available) every 15 minutes x 2, then every 30 minutes x 2, then every 1 hour x 1 after each naloxone dose.  Consider transfer to ICU if patient respiratory parameters have not improved after 4 naloxone doses.    Class: E-Prescribe    Route: Intravenous    Linked Group 2: See  [facility] for full Linked Orders Report.        naloxone (NARCAN) injection 0.4 mg 0.4 mg EVERY 2 MIN PRN 6/25/2023     Admin Instructions: Administer intramuscular if an intravenous route is not available and notify provider when administered.  For unintended sedation or respiratory depression if all of the below criteria are met:  ~ respiratory rate LESS than or EQUAL to 8.  ~ SaO2 less than 92% and or/end-tidal CO2 is greater than 50.  ~ the patient is receiving an opioid, has unintended sedation assessed as RASS (-4) or (-5) and patient is currently not on mechanical ventilation.  RASS scale (-4) is deep sedation with no response to voice but movement or eye opening to physical stimulation.  RASS scale (-5) is unarousable.      Patient Monitoring  Once the patient has demonstrated a response to the naloxone, continue to monitor respiratory rate, depth, oxygen saturation and end-tidal CO2 (if available) every 15 minutes x 2, then every 30 minutes x 2, then every 1 hour x 1 after each naloxone dose.  Consider transfer to ICU if patient respiratory parameters have not improved after 4 naloxone doses.    Class: E-Prescribe    Route: Intramuscular    Linked Group 2: See [facility] for full Linked Orders Report.        Nurse may request from Pharmacy a change of form of medication (e.g. Liquid to tablet).  CONTINUOUS PRN 6/25/2023     Admin Instructions: Nurse may request from Pharmacy a change of form of medication (e.g. Liquid to tablet).    Class: E-Prescribe    Route: Does not apply    nystatin (MYCOSTATIN) suspension 500,000 Units 500,000 Units 4 TIMES DAILY 6/25/2023     Admin Instructions: Shake well    Class: E-Prescribe    Notes to Pharmacy: PTA Sig:Take 5 mLs (500,000 Units) by mouth 4 times daily      Route: Oral    ondansetron (ZOFRAN ODT) ODT tab 4 mg 4 mg EVERY 6 HOURS PRN 6/25/2023     Admin Instructions: This is Step 1 of nausea and vomiting management.  If nausea not resolved in 15 minutes, go to  Step 2 prochlorperazine (COMPAZINE).  With dry hands, peel back foil backing and gently remove tablet. Do not push oral disintegrating tablet through foil backing. Administer immediately on tongue and oral disintegrating tablet dissolves in seconds, then swallow with saliva. Liquid not required.    Class: E-Prescribe    Route: Oral    Linked Group 3: See [facility] for full Linked Orders Report.        ondansetron (ZOFRAN) injection 4 mg 4 mg EVERY 6 HOURS PRN 2023     Admin Instructions: Give IF patient unable to tolerate oral medication.  This is Step 1 of nausea and vomiting management. If nausea not resolved in 15 minutes, go to Step 2 prochlorperazine (COMPAZINE).  Irritant.    Class: E-Prescribe    Route: Intravenous    Linked Group 3: See [facility] for full Linked Orders Report.        oxyCODONE IR (ROXICODONE) half-tab 2.5 mg 2.5 mg EVERY 4 HOURS PRN 2023     Notes to Pharmacy: PTA Si.5 tablets (2.5 mg) by Oral or Feeding Tube route every 4 hours as needed for moderate pain      Route: Oral or Feeding Tube    pantoprazole (PROTONIX) EC tablet 40 mg 40 mg EVERY MORNING BEFORE BREAKFAST 2023     Admin Instructions: Indication: GI prophylaxis    DO NOT CRUSH.    Class: E-Prescribe    Notes to Pharmacy: PTA Sig:Take 1 tablet (40 mg) by mouth every morning (before breakfast)      Route: Oral    Patient is already receiving anticoagulation with heparin, enoxaparin (LOVENOX), warfarin (COUMADIN)  or other anticoagulant medication  CONTINUOUS PRN 2023     Class: E-Prescribe    Route: Does not apply    polyethylene glycol (MIRALAX) Packet 17 g 17 g DAILY 2023     Admin Instructions: Indication: constipation  1 Packet = 17 grams. Mix each gram with at least 1/2 ounce (15 mL) of water -   8 ounces for 17 g dose, 4 ounces for 8.5 g dose, 2 ounces for 4 g dose.  Follow with the same volume of water.  Hold for loose stools unless being administered as part of a bowel prep regimen or bowel  clean out.    Class: E-Prescribe    Notes to Pharmacy: PTA Sig:Take 17 g by mouth daily      Route: Oral    predniSONE (DELTASONE) tablet 10 mg 10 mg DAILY 2023     Class: E-Prescribe    Route: Oral    simethicone (MYLICON) chewable tablet 80 mg 80 mg EVERY 6 HOURS PRN 2023     Class: E-Prescribe    Notes to Pharmacy: PTA Si tablet (80 mg) by Oral or Feeding Tube route every 6 hours as needed for cramping      Route: Oral or Feeding Tube    sodium chloride (PF) 0.9% PF flush 10-20 mL 10-20 mL EVERY 1 MIN PRN 2023     Admin Instructions: Sodium chloride 0.9% 10 mL flush pre and post IV medications    Sodium chloride 0.9% 20 mL flush pre and post blood draws or blood administration    Class: E-Prescribe    Route: Intracatheter    sodium chloride (PF) 0.9% PF flush 10-40 mL 10-40 mL EVERY 8 HOURS 2023     Admin Instructions: Max dose: 10 mL for each lumen    Administration instructions IF NOT DONE MORE FREQUENTLY    Class: E-Prescribe    Route: Intracatheter    sulfamethoxazole-trimethoprim (BACTRIM) 400-80 MG per tablet 1 tablet 1 tablet DAILY 2023     Admin Instructions: Dose adjusted per renal dosing policy.  Estimated CrCl = >30mL/min.       Class: E-Prescribe    Route: Oral    tacrolimus (GENERIC EQUIVALENT) capsule 2 mg 2 mg 2 TIMES DAILY 2023     Admin Instructions: Check if DRUG LEVEL is needed BEFORE administering dose.  This order specifically allows the use of a generic equivalent of tacrolimus (PROGRAF) capsules.    Class: E-Prescribe    Notes to Pharmacy: PTA Sig:Take 2 capsules (2 mg) by mouth 2 times daily      Route: Oral    tuberculin injection 5 Units 5 Units EVERY 21 DAYS 2023    Admin Instructions: For tuberculosis diagnostic without controls.  Read skin test in 48 to 72 hours.  Adjust reading time if needed based on time of administration.   Repeat Mantoux test 3 weeks after the initial test if result is negative.  If test is positive, RN charting the  positive result should discontinue this order.    Class: E-Prescribe    Route: Intradermal    ursodiol (ACTIGALL) tablet 250 mg 250 mg 2 TIMES DAILY 6/25/2023     Class: E-Prescribe    Notes to Pharmacy: PTA Sig:Take 1 tablet (250 mg) by mouth 2 times daily      Route: Oral    valGANciclovir (VALCYTE) tablet 450 mg 450 mg EVERY OTHER DAY 6/25/2023     Admin Instructions: Do Not Crush    Class: E-Prescribe    Notes to Pharmacy: PTA Sig:Take 1 tablet (450 mg) by mouth every other day      Route: Oral    warfarin ANTICOAGULANT (COUMADIN) half-tab 1.25 mg (Completed) 1.25 mg ONCE AT 6PM 7/2/2023 7/2/2023    Class: E-Prescribe    Route: Oral    Warfarin Dose Required Daily - Pharmacist Managed 1 each SEE ADMIN INSTRUCTIONS 6/25/2023     Admin Instructions: *Note to reorder warfarin daily*  Nurse to contact pharmacist if dose not ordered by 6 PM.  Pharmacy Warfarin Dosing Service  Patient is on Warfarin Therapy - check for daily order    Class: E-Prescribe    Route: Oral        Patient has no known allergies.   REVIEW OF SYSTEMS (check box if normal)  [x]               GENERAL  [x]                 PULMONARY [x]                GENITOURINARY  [x]                CNS                 [x]                 CARDIAC  [x]                 ENDOCRINE  [x]                EARS,NOSE,THROAT [x]                 GASTROINTESTINAL [x]                 NEUROLOGIC    [x]                MUSCLOSKELTAL  [x]                  HEMATOLOGY      PHYSICAL EXAM (check box if normal)There were no vitals taken for this visit.        [x]            GENERAL:    [x]       EYES:  ICTERIC   []        YES  []                    NO  [x]           EXTREMITIES: Clubbing []       Y     [x]           N    [x]           EARS, NOSE, THROAT: Membranes Moist    YES   [x]                   NO []                  [x]           LUNGS:  CLEAR    YES       [x]                  NO    []                                [x]           SKIN: Jaundice           YES       []                   NO    [x]                   Rash: YES       []                  NO    [x]                                     [x]             HEART: Regular Rate          YES       [x]                  NO    []                   Incision Clean:  YES       [x]                  NO    []                                [x]                    ABDOMEN: Wound healthy Organomegaly YES       []                  NO    [x]                       [x]                    NEUROLOGICAL:  Nonfocal  YES       [x]                  NO    []                       [x]                    Hernia YES       []                  NO    [x]                   PSYCHIATRIC:  Appropriate  YES       [x]                  NO    []                       OTHER:                                                                                                   PAIN SCALE:: 3      Again, thank you for allowing me to participate in the care of your patient.        Sincerely,        Chad Clifton MD

## 2023-07-03 NOTE — PROGRESS NOTES
Transplant Surgery -OUTPATIENT IMMUNOSUPPRESSION PROGRESS NOTE    Date of Visit: 07/03/2023    Transplants:  6/6/2023 (Liver), 6/6/2023 (Kidney); Postoperative day:  27 (Liver), 27 (Kidney)  ASSESMENT AND PLAN:  1.Graft Function: Liver allograft: no rejection or technical problems.  Mild increase in alkaline phosphatase, will continue to monitor,   Kidney allograft: no rejection or technical problems;   Creatinine is improving  2.Immunosuppression Management: keep tacrolimus levels 5-6 ng/dL, continue cellcept  And keep prednisone at 5 mg po daily  3.Hypertension: ok  4.Renal Function: improving  5.Lab frequency: twice weekly  6.Other:  1. Remove staples, dialysis line and replace PICC line  2. Neurology consult for neuropathy and consider lyrica  3. Add metalazone 5 mg po daily  4. Continue nutritional support and physcial therapy    Date: July 3, 2023    Transplant:  [x]                             Liver [x]                              Kidney []                             Pancreas []                              Other:             Chief Complaint:  Weak unable to walk due to neuropathy and has ascites      History of Present Illness:  Patient Active Problem List   Diagnosis     Alcoholic cirrhosis of liver with ascites (H)     Psoriatic arthritis (H)     PAF (paroxysmal atrial fibrillation) (H)     End-stage liver disease (H)     Leukocytosis, unspecified type     ESRD (end stage renal disease) on dialysis (H)     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)     Esophageal varices in cirrhosis (H)     Essential hypertension     Family history of colon cancer     Family history of prostate cancer     GAVE (gastric antral vascular ectasia)     Hereditary hemochromatosis (H)     Other hyperlipidemia     Psoriasis     S/P TIPS (transjugular intrahepatic portosystemic shunt)     Tophaceous gout     Deep vein thrombosis (DVT) of non-extremity vein, unspecified chronicity      Moderate protein-calorie malnutrition (H)     Alcohol use, unspecified with unspecified alcohol-induced disorder (H)     Acute deep vein thrombosis (DVT) of right lower extremity, unspecified vein (H)     Encephalopathy     Pre-operative cardiovascular examination     CKD (chronic kidney disease) stage 5, GFR less than 15 ml/min (H)     Status post coronary angiogram     Recurrent Clostridioides difficile diarrhea     Chronic atrial fibrillation (H)     Physical deconditioning     Liver transplant recipient (H)     Bacteremia due to Enterococcus     Administration of long-term prophylactic antibiotics     Immunosuppressed status (H)     Ileus, postoperative (H)     Atrial fibrillation with rapid ventricular response (H)     Delayed graft function of kidney     Hypotension     Kidney transplant recipient     Acute post-operative pain     CAD (coronary artery disease)     Severe malnutrition (H)     Steroid-induced hyperglycemia     Muscular deconditioning     SOCIAL /FAMILY HISTORY: [x]                  No recent change    Past Medical History:   Diagnosis Date     Alcoholic cirrhosis of liver with ascites (H) 10/11/2019     ANCA-associated vasculitis (H) 2022     C. difficile colitis      Coronary artery disease     [facility] 4/2023 - complete occlusion of RCA     ESRD (end stage renal disease) on dialysis (H)      Gout      History of hemochromatosis 10/11/2019     Hypertension      IgA nephropathy      Obesity      PAF (paroxysmal atrial fibrillation) (H)      Pre-diabetes 2023     Psoriatic arthritis (H)      Psoriatic arthritis (H)      RA (rheumatoid arthritis) (H)      Status post kidney transplant 06/06/2023    Induction with thymoglobulin 4mg/kg, + DSA CW9     Status post liver transplantation (H) 06/06/2023     Past Surgical History:   Procedure Laterality Date     APPENDECTOMY      Removed at 18 Years Old      BENCH KIDNEY  06/06/2023    Procedure: Bench kidney;  Surgeon: Chad Clifton MD;  Location:  [facility] OR     BENCH LIVER  2023    Procedure: Bench liver;  Surgeon: Chad Clifton MD;  Location:  OR     COLONOSCOPY       at [facility].      CV CORONARY ANGIOGRAM N/A 2023    Procedure: Coronary Angiogram;  Surgeon: Pablo Araujo MD;  Location:  [facility]     EYE SURGERY Bilateral     Cataract     H STATISTIC PICC LINE INSERTION >5YR, FAILED Left 2023    Unable to advance catheter over the axillary area     HERNIA REPAIR      History of bilateral inguinal hernia repair: 10/28/2014. Open hernia repair: 10/2017. Abdominal wound exploration and debridement 2017     INSERT SHUNT PORTAL TRANSJUGULAR INTRAHEPTIC  2022     IR CVC TUNNEL PLACEMENT > 5 YRS OF AGE  2023     IR PICC PLACEMENT > 5 YRS OF AGE  2023     IR RENAL BIOPSY RIGHT  2023     RETURN LIVER TRANSPLANT N/A 2023    Procedure: Return liver transplant. Intra-operative ultrasound;  Surgeon: Chad Clifton MD;  Location: [facility] OR     TRANSPLANT KIDNEY RECIPIENT  DONOR N/A 2023    Procedure: Transplant kidney recipient  donor, ureteral stent placement;  Surgeon: Chad Clifton MD;  Location: [facility] OR     TRANSPLANT LIVER RECIPIENT  DONOR N/A 2023    Procedure: Transplant liver recipient  donor;  Surgeon: Chad Clifton MD;  Location:  OR     Social History     Socioeconomic History     Marital status:      Spouse name: Not on file     Number of children: Not on file     Years of education: Not on file     Highest education level: Not on file   Occupational History     Not on file   Tobacco Use     Smoking status: Never     Passive exposure: Never     Smokeless tobacco: Never   Vaping Use     Vaping Use: Never used   Substance and Sexual Activity     Alcohol use: Not Currently     Comment: Last ETOH use was 2021     Drug use: Not Currently     Sexual activity: Not on file   Other Topics  Concern     Parent/sibling w/ CABG, MI or angioplasty before 65F 55M? Not Asked   Social History Narrative     Not on file     Social Determinants of Health     Financial Resource Strain: Not on file   Food Insecurity: Not on file   Transportation Needs: Not on file   Physical Activity: Not on file   Stress: Not on file   Social Connections: Not on file   Intimate Partner Violence: Not on file   Housing Stability: Not on file     Prescription Medications as of 7/3/2023       Rx Number Disp Refills Start End Last Dispensed Date Next Fill Date Owning Pharmacy    albuterol (PROAIR HFA/PROVENTIL HFA/VENTOLIN HFA) 108 (90 Base) MCG/ACT inhaler  18 g  2023        Sig: Inhale 2 puffs into the lungs every 6 hours as needed for wheezing    Class: Transitional    Notes to Pharmacy: Pharmacy may dispense brand covered by insurance (Proair, or proventil or ventolin or generic albuterol inhaler)    Route: Inhalation    allopurinol (ZYLOPRIM) 100 MG tablet    2023        Sig: Take 1 tablet (100 mg) by mouth daily    Class: Transitional    Route: Oral    aspirin (ASA) 325 MG tablet    2023        Si tablet (325 mg) by Oral or Feeding Tube route daily    Class: Transitional    Route: Oral or Feeding Tube    atorvastatin (LIPITOR) 20 MG tablet    2023        Si tablet (20 mg) by Oral or Feeding Tube route every evening    Class: Transitional    Route: Oral or Feeding Tube    bisacodyl (DULCOLAX) 10 MG suppository    2023        Sig: Place 1 suppository (10 mg) rectally daily as needed for constipation    Class: Transitional    Route: Rectal    bumetanide (BUMEX) 2 MG tablet    2023        Sig: Take 1 tablet (2 mg) by mouth 2 times daily    Class: Transitional    Route: Oral    diclofenac (VOLTAREN) 1 % topical gel    2023        Sig: Apply 4 g topically 3 times daily    Class: Transitional    Route: Topical    insulin aspart (NOVOLOG PEN) 100 UNIT/ML pen  15 mL  2023        Sig: Inject  1-12 Units Subcutaneous every 4 hours    Class: Transitional    Route: Subcutaneous    insulin aspart (NOVOLOG PEN) 100 UNIT/ML pen  15 mL  2023        Sig: Inject 1-7 Units Subcutaneous 3 times daily (with meals) DOSE:  1 units per 15 grams of carbohydrate.  Only chart total amount of units given.  Do not give if pre-prandial glucose is less than 60 mg/dL.  If given at mealtime, administer within 30 minutes of start of meal.    Class: Transitional    Route: Subcutaneous    insulin aspart (NOVOLOG PEN) 100 UNIT/ML pen  15 mL  2023        Sig: Inject 1-7 Units Subcutaneous Take with snacks or supplements for high blood sugar    Class: Transitional    Route: Subcutaneous    insulin glargine (LANTUS PEN) 100 UNIT/ML pen  15 mL  2023        Sig: Inject 10 Units Subcutaneous At Bedtime    Class: Transitional    Notes to Pharmacy: If Lantus is not covered by insurance, may substitute Basaglar or Semglee or other insulin glargine product per insurance preference at same dose and frequency.      Route: Subcutaneous    magnesium oxide (MAG-OX) 400 MG tablet    2023        Sig: Take 1 tablet (400 mg) by mouth daily    Class: Transitional    Route: Oral    methocarbamol (ROBAXIN) 500 MG tablet    2023        Si tablet (500 mg) by Oral or Feeding Tube route 4 times daily as needed for muscle spasms    Class: Transitional    Route: Oral or Feeding Tube    metoprolol tartrate (LOPRESSOR) 25 MG tablet    2023        Si.5 tablets (12.5 mg) by Oral or Feeding Tube route every 8 hours    Class: Transitional    Route: Oral or Feeding Tube    multivitamin RENAL (RENAVITE RX/NEPHROVITE) 1 tablet tablet    2023        Si tablet by Oral or Feeding Tube route daily    Class: Transitional    Route: Oral or Feeding Tube    mycophenolate (GENERIC EQUIVALENT) 250 MG capsule    2023        Sig: Take 3 capsules (750 mg) by mouth 2 times daily    Class: Transitional    Route: Oral    nystatin  (MYCOSTATIN) 729963 UNIT/ML suspension    2023        Sig: Take 5 mLs (500,000 Units) by mouth 4 times daily    Class: Transitional    Route: Oral    ondansetron (ZOFRAN ODT) 4 MG ODT tab    2023        Sig: Take 1 tablet (4 mg) by mouth every 6 hours as needed for nausea or vomiting    Class: Transitional    Route: Oral    oxyCODONE (ROXICODONE) 5 MG tablet   0 2023        Si.5 tablets (2.5 mg) by Oral or Feeding Tube route every 4 hours as needed for moderate pain    Class: Transitional    Earliest Fill Date: 2023    Route: Oral or Feeding Tube    pantoprazole (PROTONIX) 40 MG EC tablet    2023        Sig: Take 1 tablet (40 mg) by mouth every morning (before breakfast)    Class: Transitional    Route: Oral    polyethylene glycol (MIRALAX) 17 GM/Dose powder  510 g  2023        Sig: Take 17 g by mouth daily    Class: Transitional    Route: Oral    predniSONE (DELTASONE) 10 MG tablet    2023        Sig: Take 3 tablets (30 mg) by mouth daily    Class: Transitional    Route: Oral    predniSONE (DELTASONE) 10 MG tablet    2023        Sig: Take 3 tabs by mouth on , then 2 tabs daily x 3 days (-).    Class: Transitional    predniSONE (DELTASONE) 10 MG tablet    2023        Si tablet (10 mg) by Per Feeding Tube route daily    Class: Transitional    Route: Per Feeding Tube    simethicone (MYLICON) 80 MG chewable tablet    2023        Si tablet (80 mg) by Oral or Feeding Tube route every 6 hours as needed for cramping    Class: Transitional    Route: Oral or Feeding Tube    sulfamethoxazole-trimethoprim (BACTRIM) 400-80 MG tablet    2023        Si tablet by Oral or Feeding Tube route three times a week    Class: Transitional    Route: Oral or Feeding Tube    tacrolimus (GENERIC EQUIVALENT) 1 MG capsule    2023        Sig: Take 2 capsules (2 mg) by mouth 2 times daily    Class: Transitional    Route: Oral    ursodiol (ACTIGALL) 250 MG  tablet    6/25/2023        Sig: Take 1 tablet (250 mg) by mouth 2 times daily    Class: Transitional    Route: Oral    valGANciclovir (VALCYTE) 450 MG tablet    6/26/2023        Sig: Take 1 tablet (450 mg) by mouth every other day    Class: Transitional    Notes to Pharmacy: Titrate to 900 mg daily dose. CMV PPX (SLK).    Route: Oral      Hospital Medications as of 7/3/2023       Dose Frequency Start End    - Skin Test Reading -  EVERY 21 DAYS 6/28/2023 8/9/2023    Admin Instructions: For tuberculosis diagnostic:  Adjust reading time if needed based on time of administration.  Reading must occur between 48-72 hours after administration.    Class: E-Prescribe    Route: Does not apply    acetaminophen (TYLENOL) Suppository 650 mg 650 mg EVERY 6 HOURS PRN 6/28/2023     Admin Instructions: Maximum acetaminophen dose from all sources = 75 mg/kg/day not to exceed 4 grams/day.    Class: E-Prescribe    Route: Rectal    acetaminophen (TYLENOL) tablet 650 mg 650 mg EVERY 4 HOURS PRN 6/25/2023     Admin Instructions: Maximum acetaminophen dose from all sources = 75 mg/kg/day not to exceed 4 grams/day.    Class: E-Prescribe    Route: Oral    albuterol (PROVENTIL HFA/VENTOLIN HFA) inhaler 2 puff EVERY 6 HOURS PRN 6/25/2023     Admin Instructions: Check the dose counter on the inhaler to ensure there are doses remaining before administering. Prime by spraying into the air 4 times prior to first use and if not used within 2 weeks.    Class: E-Prescribe    Notes to Pharmacy: PTA Sig:Inhale 2 puffs into the lungs every 6 hours as needed for wheezing      Route: Inhalation    allopurinol (ZYLOPRIM) tablet 100 mg 100 mg DAILY 6/26/2023     Admin Instructions: Indication: gout    Class: E-Prescribe    Notes to Pharmacy: PTA Sig:Take 1 tablet (100 mg) by mouth daily      Route: Oral    alteplase (CATHFLO ACTIVASE) injection 2 mg (Completed) 2 mg EVERY 2 HOURS 7/2/2023 7/2/2023    Admin Instructions: For Central Venous Catheter  Use 10  mL syringe to draw up 2 ML and instill into clotted catheter & allow to dwell for 30 mins, then DRAW BACK and discard contents to assess catheter function.  If still occluded, allow mixture to dwell for an additional 90 mins, then DRAW BACK and discard contents to reassess catheter function.  May repeat dose once if occlusion persists.  Contact provider if 2nd dose is unsuccessful.  Only use a 10 ml syringe to administer the solution into each lumen. For catheters with lumen volumes greater than the volume dispensed, request additional syringe(s) from pharmacy.  To prevent inadvertent embolization of thrombus from the catheter, ALWAYS WITHDRAW alteplase (ACTIVASE) at the end of the dwell time before administering any solution through the catheter.    Class: E-Prescribe    Route: Intravenous    aspirin (ASA) tablet 325 mg 325 mg DAILY 2023     Admin Instructions: Indication: CAD    Class: E-Prescribe    Notes to Pharmacy: PTA Si tablet (325 mg) by Oral or Feeding Tube route daily      Route: Oral or Feeding Tube    atorvastatin (LIPITOR) tablet 20 mg 20 mg EVERY EVENING 2023     Admin Instructions: Indication: CAD    Class: E-Prescribe    Notes to Pharmacy: PTA Si tablet (20 mg) by Oral or Feeding Tube route every evening      Route: Oral or Feeding Tube    bisacodyl (DULCOLAX) suppository 10 mg 10 mg DAILY PRN 2023     Admin Instructions: Hold for loose stools.    Class: E-Prescribe    Notes to Pharmacy: PTA Sig:Place 1 suppository (10 mg) rectally daily as needed for constipation      Route: Rectal    bumetanide (BUMEX) tablet 2 mg 2 mg 2 TIMES DAILY 2023     Admin Instructions: Indication: diuresis  Schedule 0800 and 1600    Hold for sbp<100    Class: E-Prescribe    Notes to Pharmacy: PTA Sig:Take 1 tablet (2 mg) by mouth 2 times daily      Route: Oral    dextrose 50 % injection 25-50 mL 25-50 mL EVERY 15 MIN PRN 2023     Admin Instructions: Use if have IV access, BG less than 70  mg/dL and meet dose criteria below:  Dose if conscious and alert (or disorientated) and NPO = 25 mL  Dose if unconscious / not alert = 50 mL    Give first dose for initial blood glucose less than 70  mg/dL.  If blood glucose at 15 minute recheck is less than or equal to 100 mg/dL continue to administer carbohydrate treatment every 15 minutes, as needed, based on blood glucose and assessment parameters until blood glucose level is above 100 mg/dL.  Vesicant.    Class: E-Prescribe    Route: Intravenous    Linked Group 1: See [employer] for full Linked Orders Report.        diclofenac (VOLTAREN) 1 % topical gel 4 g 4 g 3 TIMES DAILY 6/25/2023     Admin Instructions: Indication: pain    Apply to affected area.   Send dosing card with product.    Class: E-Prescribe    Notes to Pharmacy: PTA Sig:Apply 4 g topically 3 times daily      Route: Topical    gabapentin (NEURONTIN) capsule 100 mg 100 mg 3 TIMES DAILY 6/28/2023     Class: E-Prescribe    Route: Oral    glucagon injection 1 mg 1 mg EVERY 15 MIN PRN 6/25/2023     Admin Instructions: May give SQ or IM. ONLY use glucagon IF patient has NO IV access AND is UNABLE to swallow AND blood glucose is LESS than or EQUAL to 50 mg/dL.    Class: E-Prescribe    Route: Subcutaneous    Linked Group 1: See [employer] for full Linked Orders Report.        glucose gel 15-30 g 15-30 g EVERY 15 MIN PRN 6/25/2023     Admin Instructions: Give first dose for initial blood glucose less than 70 mg/dL per the dosing instructions below.   If blood glucose at 15 minute rechecks is still less than or equal to 100 mg/dL, continue to administer doses per blood glucose parameters every 15 minutes, as needed, until blood glucose level is above 100 mg/dL.    Dosing Instructions:  ~If patient is conscious and able to swallow and NO enteral tube  For initial BG 51-69mg/dL OR 15 minute recheck BG 51- 100 mg/dL - give 15 g  For BG less than or equal to 50 mg/dL - give 30 g  ~ If Enteral tube  For initial  BG 51-69mg/dL OR 15 minute recheck BG 51- 100 mg/dL - give apple juice 120 mL (4 oz or 15 g of CHO) via enteral tube  For BG less than or equal to 50 mg/dL - Give apple juice 240 mL (8 oz or 30 g of CHO) via enteral tube  ~Oral gel is preferable for conscious and able to swallow patient.   ~IF gel unavailable or patient refuses may provide apple juice per Enteral tube dosing instructions.  Document juice on I and O flowsheet.    Class: E-Prescribe    Route: Oral    Linked Group 1: See [facility] for full Linked Orders Report.        heparin lock flush 10 UNIT/ML injection 5-20 mL 5-20 mL EVERY 24 HOURS 6/28/2023     Admin Instructions: To lock each CVC Open Ended (Tunneled and Non-Tunneled) dormant lumen.   MAX dose: 5 mL for each lumen  Check PRN heparin flush order to see when the last dose of the PRN heparin was given before administering this heparin dose.   To lock the lumens: Flush with 10 mL sodium chloride 0.9% for each lumen to ensure patency and then flush each dormant lumen with 5 mL of heparin to lock the dormant line.    Class: E-Prescribe    Route: Intracatheter    heparin lock flush 10 UNIT/ML injection 5-20 mL 5-20 mL EVERY 1 HOUR PRN 6/28/2023     Admin Instructions: MAX dose: 5 mL for each lumen  Check PRN heparin flush order to see when the last dose of the PRN heparin was given before administering this heparin dose.   To lock the lumens: Flush with 10 mL sodium chloride 0.9% for each lumen to ensure patency and then flush each dormant lumen with 5 mL of heparin to lock the dormant line.    Class: E-Prescribe    Route: Intracatheter    insulin aspart (NovoLOG) injection (RAPID ACTING)  DAILY WITH BREAKFAST 6/26/2023     Admin Instructions: DOSE:  1 units per 15 grams of carbohydrate.  Only chart total amount of units given.  Do not give if pre-prandial glucose is less than 60 mg/dL.    Indication: DM2  If given at mealtime, administer within 30 minutes of start of meal.    Class: E-Prescribe     Route: Subcutaneous    insulin aspart (NovoLOG) injection (RAPID ACTING)  DAILY WITH LUNCH 6/26/2023     Admin Instructions: DOSE:  1 units per 15 grams of carbohydrate. Only chart total amount of units given.  Do not give if pre-prandial glucose is less than 60 mg/dL.  Indication: DM2  If given at mealtime, administer within 30 minutes of start of meal.    Class: E-Prescribe    Route: Subcutaneous    insulin aspart (NovoLOG) injection (RAPID ACTING)  DAILY WITH SUPPER 6/25/2023     Admin Instructions: DOSE:  1 units per 15 grams of carbohydrate. Only chart total amount of units given.  Do not give if pre-prandial glucose is less than 60 mg/dL.  Indication: DM2  If given at mealtime, administer within 30 minutes of start of meal.    Class: E-Prescribe    Route: Subcutaneous    insulin aspart (NovoLOG) injection (RAPID ACTING) 1-10 Units 3 TIMES DAILY BEFORE MEALS 6/25/2023     Admin Instructions: Correction Scale - HIGH INSULIN RESISTANCE DOSING     Do Not give Correction Insulin if Pre-Meal BG less than 140.   For Pre-Meal  - 164 give 1 unit.   For Pre-Meal  - 189 give 2 units.   For Pre-Meal  - 214 give 3 units.   For Pre-Meal  - 239 give 4 units.   For Pre-Meal  - 264 give 5 units.   For Pre-Meal  - 289 give 6 units.   For Pre-Meal  - 314 give 7 units.   For Pre-Meal  - 339 give 8 units.   For Pre-Meal  - 364 give 9 units.   For Pre-Meal BG greater than or equal to 365 give 10 units  To be given with prandial insulin, and based on pre-meal blood glucose.   Notify provider if glucose greater than or equal to 350 mg/dL after administration of correction dose.  Indication: DM2  If given at mealtime, administer within 30 minutes of start of meal.    Class: E-Prescribe    Route: Subcutaneous    insulin aspart (NovoLOG) injection (RAPID ACTING) 1-7 Units AT BEDTIME 6/25/2023     Admin Instructions: HIGH INSULIN RESISTANCE DOSING    Do Not give Bedtime Correction  Insulin if BG less than 200.   For  - 224 give 1 units.   For  - 249 give 2 units.   For  - 274 give 3 units.   For  - 299 give 4 units.   For  - 324 give 5 units.   For  - 349 give 6 units.   For BG greater than or equal to 350 give 7 units.   Notify provider if glucose greater than or equal to 350 mg/dL after administration of correction dose.  Indication: DM2  If given at mealtime, administer within 30 minutes of start of meal.    Class: E-Prescribe    Route: Subcutaneous    insulin glargine (LANTUS PEN) injection 15 Units 15 Units AT BEDTIME 2023     Admin Instructions: Indication: DM2    Class: E-Prescribe    Notes to Pharmacy: PTA Sig:Inject 10 Units Subcutaneous At Bedtime      Route: Subcutaneous    magnesium oxide (MAG-OX) tablet 400 mg 400 mg 2 TIMES DAILY 2023     Class: E-Prescribe    Notes to Pharmacy: PTA Sig:Take 1 tablet (400 mg) by mouth daily      Route: Oral    methocarbamol (ROBAXIN) tablet 500 mg 500 mg 4 TIMES DAILY PRN 2023     Class: E-Prescribe    Notes to Pharmacy: PTA Si tablet (500 mg) by Oral or Feeding Tube route 4 times daily as needed for muscle spasms      Route: Oral or Feeding Tube    metoprolol tartrate (LOPRESSOR) half-tab 12.5 mg 12.5 mg EVERY 8 HOURS 2023     Admin Instructions: Hold for SBP < 105 mm hg or HR < 60/min    Class: E-Prescribe    Notes to Pharmacy: PTA Si.5 tablets (12.5 mg) by Oral or Feeding Tube route every 8 hours      Route: Oral or Feeding Tube    multivitamin RENAL (RENAVITE RX/NEPHROVITE) tablet 1 tablet 1 tablet DAILY 2023     Class: E-Prescribe    Notes to Pharmacy: PTA Si tablet by Oral or Feeding Tube route daily      Route: Oral or Feeding Tube    mycophenolate (GENERIC EQUIVALENT) capsule 750 mg 750 mg 2 TIMES DAILY 2023     Admin Instructions: Indication: immunosuppression    Do not open capsule for use down feeding tube. Check if DRUG LEVEL is needed BEFORE administering  dose.  This order specifically allows the use of GENERIC mycophenolate as an equivalent of CELLCEPT capsules.    When possible, take 1 to 2 hours apart from any product containing magnesium or aluminum.    Class: E-Prescribe    Notes to Pharmacy: PTA Sig:Take 3 capsules (750 mg) by mouth 2 times daily      Route: Oral    naloxone (NARCAN) injection 0.2 mg 0.2 mg EVERY 2 MIN PRN 6/25/2023     Admin Instructions: Administer intravenous route when available and notify provider when administered.  For unintended sedation or respiratory depression if all of the below criteria are met:  ~ respiratory rate LESS than or EQUAL to 8.   ~SaO2 less than 92% and or/end-tidal CO2 is greater than 50.  ~ the patient is receiving an opioid, has unintended sedations assessed as RASS (-3), and is currently not on mechanical ventilation.  RASS scale moderate (-3) is movement or eye opening to voice but no eye contact.    Patient Monitoring  Once the patient has demonstrated a response to the naloxone, continue to monitor respiratory rate, depth, oxygen saturation and end-tidal CO2 (if available) every 15 minutes x 2, then every 30 minutes x 2, then every 1 hour x 1 after each naloxone dose.  Consider transfer to ICU if patient respiratory parameters have not improved after 4 naloxone doses.    Class: E-Prescribe    Route: Intravenous    Linked Group 2: See Hyperspace for full Linked Orders Report.        naloxone (NARCAN) injection 0.2 mg 0.2 mg EVERY 2 MIN PRN 6/25/2023     Admin Instructions: Administer intramuscular if an intravenous route is not available and notify provider when administered.  For unintended sedation or respiratory depression if all of the below criteria are met:  ~ respiratory rate LESS than or EQUAL to 8.   ~SaO2 less than 92% and or/end-tidal CO2 is greater than 50.  ~ the patient is receiving an opioid, has unintended sedations assessed as RASS (-3), and is currently not on mechanical ventilation.  RASS scale  moderate (-3) is movement or eye opening to voice but no eye contact.    Patient Monitoring  Once the patient has demonstrated a response to the naloxone, continue to monitor respiratory rate, depth, oxygen saturation and end-tidal CO2 (if available) every 15 minutes x 2, then every 30 minutes x 2, then every 1 hour x 1 after each naloxone dose.  Consider transfer to ICU if patient respiratory parameters have not improved after 4 naloxone doses.    Class: E-Prescribe    Route: Intramuscular    Linked Group 2: See Hyperspace for full Linked Orders Report.        naloxone (NARCAN) injection 0.4 mg 0.4 mg EVERY 2 MIN PRN 6/25/2023     Admin Instructions: Administer intravenous route when available and notify provider when administered.  For unintended sedation or respiratory depression if all of the below criteria are met:  ~ respiratory rate LESS than or EQUAL to 8.  ~ SaO2 less than 92% and or/end-tidal CO2 is greater than 50.  ~ the patient is receiving an opioid, has unintended sedation assessed as RASS (-4) or (-5) and patient is currently not on mechanical ventilation.  RASS scale (-4) is deep sedation with no response to voice but movement or eye opening to physical stimulation.  RASS scale (-5) is unarousable.      Patient Monitoring  Once the patient has demonstrated a response to the naloxone, continue to monitor respiratory rate, depth, oxygen saturation and end-tidal CO2 (if available) every 15 minutes x 2, then every 30 minutes x 2, then every 1 hour x 1 after each naloxone dose.  Consider transfer to ICU if patient respiratory parameters have not improved after 4 naloxone doses.    Class: E-Prescribe    Route: Intravenous    Linked Group 2: See Hyperspace for full Linked Orders Report.        naloxone (NARCAN) injection 0.4 mg 0.4 mg EVERY 2 MIN PRN 6/25/2023     Admin Instructions: Administer intramuscular if an intravenous route is not available and notify provider when administered.  For unintended  sedation or respiratory depression if all of the below criteria are met:  ~ respiratory rate LESS than or EQUAL to 8.  ~ SaO2 less than 92% and or/end-tidal CO2 is greater than 50.  ~ the patient is receiving an opioid, has unintended sedation assessed as RASS (-4) or (-5) and patient is currently not on mechanical ventilation.  RASS scale (-4) is deep sedation with no response to voice but movement or eye opening to physical stimulation.  RASS scale (-5) is unarousable.      Patient Monitoring  Once the patient has demonstrated a response to the naloxone, continue to monitor respiratory rate, depth, oxygen saturation and end-tidal CO2 (if available) every 15 minutes x 2, then every 30 minutes x 2, then every 1 hour x 1 after each naloxone dose.  Consider transfer to ICU if patient respiratory parameters have not improved after 4 naloxone doses.    Class: E-Prescribe    Route: Intramuscular    Linked Group 2: See [facility] for full Linked Orders Report.        Nurse may request from Pharmacy a change of form of medication (e.g. Liquid to tablet).  CONTINUOUS PRN 6/25/2023     Admin Instructions: Nurse may request from Pharmacy a change of form of medication (e.g. Liquid to tablet).    Class: E-Prescribe    Route: Does not apply    nystatin (MYCOSTATIN) suspension 500,000 Units 500,000 Units 4 TIMES DAILY 6/25/2023     Admin Instructions: Shake well    Class: E-Prescribe    Notes to Pharmacy: PTA Sig:Take 5 mLs (500,000 Units) by mouth 4 times daily      Route: Oral    ondansetron (ZOFRAN ODT) ODT tab 4 mg 4 mg EVERY 6 HOURS PRN 6/25/2023     Admin Instructions: This is Step 1 of nausea and vomiting management.  If nausea not resolved in 15 minutes, go to Step 2 prochlorperazine (COMPAZINE).  With dry hands, peel back foil backing and gently remove tablet. Do not push oral disintegrating tablet through foil backing. Administer immediately on tongue and oral disintegrating tablet dissolves in seconds, then swallow  with saliva. Liquid not required.    Class: E-Prescribe    Route: Oral    Linked Group 3: See Hyperspace for full Linked Orders Report.        ondansetron (ZOFRAN) injection 4 mg 4 mg EVERY 6 HOURS PRN 2023     Admin Instructions: Give IF patient unable to tolerate oral medication.  This is Step 1 of nausea and vomiting management. If nausea not resolved in 15 minutes, go to Step 2 prochlorperazine (COMPAZINE).  Irritant.    Class: E-Prescribe    Route: Intravenous    Linked Group 3: See Hyperspace for full Linked Orders Report.        oxyCODONE IR (ROXICODONE) half-tab 2.5 mg 2.5 mg EVERY 4 HOURS PRN 2023     Notes to Pharmacy: PTA Si.5 tablets (2.5 mg) by Oral or Feeding Tube route every 4 hours as needed for moderate pain      Route: Oral or Feeding Tube    pantoprazole (PROTONIX) EC tablet 40 mg 40 mg EVERY MORNING BEFORE BREAKFAST 2023     Admin Instructions: Indication: GI prophylaxis    DO NOT CRUSH.    Class: E-Prescribe    Notes to Pharmacy: PTA Sig:Take 1 tablet (40 mg) by mouth every morning (before breakfast)      Route: Oral    Patient is already receiving anticoagulation with heparin, enoxaparin (LOVENOX), warfarin (COUMADIN)  or other anticoagulant medication  CONTINUOUS PRN 2023     Class: E-Prescribe    Route: Does not apply    polyethylene glycol (MIRALAX) Packet 17 g 17 g DAILY 2023     Admin Instructions: Indication: constipation  1 Packet = 17 grams. Mix each gram with at least 1/2 ounce (15 mL) of water -   8 ounces for 17 g dose, 4 ounces for 8.5 g dose, 2 ounces for 4 g dose.  Follow with the same volume of water.  Hold for loose stools unless being administered as part of a bowel prep regimen or bowel clean out.    Class: E-Prescribe    Notes to Pharmacy: PTA Sig:Take 17 g by mouth daily      Route: Oral    predniSONE (DELTASONE) tablet 10 mg 10 mg DAILY 2023     Class: E-Prescribe    Route: Oral    simethicone (MYLICON) chewable tablet 80 mg 80 mg EVERY 6  HOURS PRN 2023     Class: E-Prescribe    Notes to Pharmacy: PTA Si tablet (80 mg) by Oral or Feeding Tube route every 6 hours as needed for cramping      Route: Oral or Feeding Tube    sodium chloride (PF) 0.9% PF flush 10-20 mL 10-20 mL EVERY 1 MIN PRN 2023     Admin Instructions: Sodium chloride 0.9% 10 mL flush pre and post IV medications    Sodium chloride 0.9% 20 mL flush pre and post blood draws or blood administration    Class: E-Prescribe    Route: Intracatheter    sodium chloride (PF) 0.9% PF flush 10-40 mL 10-40 mL EVERY 8 HOURS 2023     Admin Instructions: Max dose: 10 mL for each lumen    Administration instructions IF NOT DONE MORE FREQUENTLY    Class: E-Prescribe    Route: Intracatheter    sulfamethoxazole-trimethoprim (BACTRIM) 400-80 MG per tablet 1 tablet 1 tablet DAILY 2023     Admin Instructions: Dose adjusted per renal dosing policy.  Estimated CrCl = >30mL/min.       Class: E-Prescribe    Route: Oral    tacrolimus (GENERIC EQUIVALENT) capsule 2 mg 2 mg 2 TIMES DAILY 2023     Admin Instructions: Check if DRUG LEVEL is needed BEFORE administering dose.  This order specifically allows the use of a generic equivalent of tacrolimus (PROGRAF) capsules.    Class: E-Prescribe    Notes to Pharmacy: PTA Sig:Take 2 capsules (2 mg) by mouth 2 times daily      Route: Oral    tuberculin injection 5 Units 5 Units EVERY 21 DAYS 2023    Admin Instructions: For tuberculosis diagnostic without controls.  Read skin test in 48 to 72 hours.  Adjust reading time if needed based on time of administration.   Repeat Mantoux test 3 weeks after the initial test if result is negative.  If test is positive, RN charting the positive result should discontinue this order.    Class: E-Prescribe    Route: Intradermal    ursodiol (ACTIGALL) tablet 250 mg 250 mg 2 TIMES DAILY 2023     Class: E-Prescribe    Notes to Pharmacy: PTA Sig:Take 1 tablet (250 mg) by mouth 2 times daily       Route: Oral    valGANciclovir (VALCYTE) tablet 450 mg 450 mg EVERY OTHER DAY 6/25/2023     Admin Instructions: Do Not Crush    Class: E-Prescribe    Notes to Pharmacy: JACINTA Sig:Take 1 tablet (450 mg) by mouth every other day      Route: Oral    warfarin ANTICOAGULANT (COUMADIN) half-tab 1.25 mg (Completed) 1.25 mg ONCE AT 6PM 7/2/2023 7/2/2023    Class: E-Prescribe    Route: Oral    Warfarin Dose Required Daily - Pharmacist Managed 1 each SEE ADMIN INSTRUCTIONS 6/25/2023     Admin Instructions: *Note to reorder warfarin daily*  Nurse to contact pharmacist if dose not ordered by 6 PM.  Pharmacy Warfarin Dosing Service  Patient is on Warfarin Therapy - check for daily order    Class: E-Prescribe    Route: Oral        Patient has no known allergies.   REVIEW OF SYSTEMS (check box if normal)  [x]               GENERAL  [x]                 PULMONARY [x]                GENITOURINARY  [x]                CNS                 [x]                 CARDIAC  [x]                 ENDOCRINE  [x]                EARS,NOSE,THROAT [x]                 GASTROINTESTINAL [x]                 NEUROLOGIC    [x]                MUSCLOSKELTAL  [x]                  HEMATOLOGY      PHYSICAL EXAM (check box if normal)There were no vitals taken for this visit.        [x]            GENERAL:    [x]       EYES:  ICTERIC   []        YES  []                    NO  [x]           EXTREMITIES: Clubbing []       Y     [x]           N    [x]           EARS, NOSE, THROAT: Membranes Moist    YES   [x]                   NO []                  [x]           LUNGS:  CLEAR    YES       [x]                  NO    []                                [x]           SKIN: Jaundice           YES       []                  NO    [x]                   Rash: YES       []                  NO    [x]                                     [x]             HEART: Regular Rate          YES       [x]                  NO    []                   Incision Clean:  YES       [x]                   NO    []                                [x]                    ABDOMEN: Wound healthy Organomegaly YES       []                  NO    [x]                       [x]                    NEUROLOGICAL:  Nonfocal  YES       [x]                  NO    []                       [x]                    Hernia YES       []                  NO    [x]                   PSYCHIATRIC:  Appropriate  YES       [x]                  NO    []                       OTHER:                                                                                                   PAIN SCALE:: 3

## 2023-07-03 NOTE — PROGRESS NOTES
CLINICAL NUTRITION SERVICES - REASSESSMENT NOTE     Nutrition Prescription    RECOMMENDATIONS FOR MDs/PROVIDERS TO ORDER:  Please obtain new zeroed bed weight as able.     Malnutrition Status:    Patient does not meet two of the established criteria necessary for diagnosing malnutrition but is at risk for malnutrition    Recommendations already ordered by Registered Dietitian (RD):  Encouraged continued adequate intakes.     Future/Additional Recommendations:  Monitor labs, intakes, and weight trends.     EVALUATION OF THE PROGRESS TOWARD GOALS   Diet: Regular  Intake/[place]: 75 - 100% of documented meals, one 25% over last week  Snacks/supplements: PRN    Pt ordering 2-3 meals daily totaling (on average) 1547 kcal and 68 g protein per day per [facility]. With documented intakes and pt reported food from home, pt is likely meeting at least 75% minimum energy and protein needs.     NEW FINDINGS/REVIEW OF SYSTEMS      Nutrition/GI: RD met with pt at bedside.     Weights: Pt with large weight fluctuations prior to and during admission to [place]. Was at similar weight to 2 months prior at the end of June.  Assume scale differences in weights 6/25-6/26.   07/01/23 95.8 kg (211 lb 3.2 oz)   06/29/23 95.4 kg (210 lb 5.1 oz)   06/26/23 96 kg (211 lb 10.3 oz)   06/25/23 98.5 kg (217 lb 2.5 oz)   06/22/23 101.7 kg (224 lb 3.3 oz)   06/20/23 102.6 kg (226 lb 3.1 oz)   06/17/23 107.6 kg (237 lb 3.4 oz)   06/13/23 108.5 kg (239 lb 3.2 oz)   06/10/23 113.8 kg (250 lb 14.1 oz)   06/07/23 117.6 kg (259 lb 4.2 oz)   06/05/23 102.3 kg (225 lb 8 oz)   05/19/23 103.4 kg (228 lb)   05/17/23 104.3 kg (230 lb)   05/11/23 99.8 kg (220 lb)   05/10/23 106.9 kg (235 lb 9.6 oz)   05/01/23 102 kg (224 lb 12.8 oz)   04/27/23 98.9 kg (218 lb)   04/19/23 100.8 kg (222 lb 5 oz)   04/10/23 99 kg (218 lb 3.2 oz)   03/21/23 101.1 kg (222 lb 14.4 oz)   03/09/23 98.4 kg (217 lb)   03/03/23 99.7 kg (219 lb 11.2 oz)   03/01/23 99.8 kg (220 lb)    02/17/23 99.8 kg (220 lb)   02/09/23 96.5 kg (212 lb 11.9 oz)   02/09/23 96.2 kg (212 lb)   02/06/23 94.8 kg (209 lb 1 oz)   02/01/23 96.6 kg (213 lb)   01/27/23 96.5 kg (212 lb 12.8 oz)   01/15/23 97.5 kg (215 lb)   12/27/22 97.7 kg (215 lb 6.4 oz)   11/22/22 103 kg (227 lb 1.2 oz)     Skin: surgical incisions     Labs reviewed   06/28/23 06:44 06/29/23 08:32 06/29/23 17:10 06/30/23 07:16 07/01/23 08:32 07/02/23 13:10 07/03/23 07:08   Sodium 133 (L)   129 (L) 131 (L)  135 (L)   Potassium 3.6   3.7 3.8  3.8   Urea Nitrogen 81.9 (H)   69.2 (H) 70.1 (H)  58.5 (H)   Creatinine 2.56 (H)   2.04 (H) 2.24 (H)  1.99 (H)   Magnesium 1.7 1.5 (L) 3.0 (H) 2.1 1.9 1.7 2.2   Phosphorus 3.8   2.1 (L)   2.7   Albumin 2.8 (L)   2.9 (L)   2.9 (L)   Alkaline Phosphatase 338 (H)   334 (H)   270 (H)   CRP Inflammation 83.22 (H)         Glucose 130 (H)   182 (H) 157 (H)  112 (H)     Medications reviewed: bumex, insulin (novolog, lantus), mag ox, renal multivitamin, mycophenolate, protonix, miralax last given 6/30, prednisone, bactrim, tacrolimus, warfarin    MALNUTRITION  % Intake: Decreased intake does not meet criteria   % Weight Loss: Difficult to assess, assume scale differences during current admission  Subcutaneous Fat Loss: None observed  Muscle Loss: Temporal:  Mild  Fluid Accumulation/Edema: Mild  Malnutrition Diagnosis: Patient does not meet two of the established criteria necessary for diagnosing malnutrition but is at risk for malnutrition    Previous Goals   Patient to consume % of nutritionally adequate meal trays TID, or the equivalent with supplements/snacks.  Evaluation: Likely met    Previous Nutrition Diagnosis  Inadequate oral intake related to nausea, decline in appetite as evidenced by previously documented intakes, pt previously requiring EN to meet needs.  Evaluation: Improving    CURRENT NUTRITION DIAGNOSIS  Predicted inadequate protein-energy intake related to variable appetite as evidenced by pt reliant  on PO intakes to meet 100% of nutritional needs with potential for variation    INTERVENTIONS  Implementation  Nutrition education for nutrition relationship to health/disease     Goals  Patient to consume % of nutritionally adequate meal trays TID, or the equivalent with supplements/snacks.    Monitoring/Evaluation  Progress toward goals will be monitored and evaluated per protocol.    Shaunna Piper MS, RDN, LDN  RD pager: [phone]  [facility] Weekend/Holiday Pager: [phone]

## 2023-07-03 NOTE — PROGRESS NOTES
Received consult for PICC placement.  Workup showed that pt had PICC and reason for consult was due to withdrawal occlusion.  Writer recommended chest xray to confirm placement.  Line CDI and flushed well.  Xray shows line at subclavian vein.  Insertion note from 6/16/23 notes IR unable to pass line for central placement due to central occlusion on L and PICC retracted and left as midline.    If patient does not need access at this time writer would recommend pulling line.  If access if needed in the future consult VAT. If central access is needed would recommend imaging of vasculature to assess for patency of central vessels and consult to IR.

## 2023-07-03 NOTE — CONSULTS
S/p liver and kidney transplant  Had right EJ temporary dialysis catheter placed 6/14  No longer needed for dialysis  Transplant surgery has consulted IR for removal  Will remove today

## 2023-07-03 NOTE — PLAN OF CARE
" RN noted small amount of bleeding on PT 's R leg shin, cleaned and applied mepliax. Pt stated that \" I accidentally hit my right leg shin to bedside table.\" Pt had Large BM. Pt is out of facility for post-op appointment. Transportation service picked up by wheel chair. Pt wife accompanied with him for appointment. Transfers with liko lift with A of 2 person. Continent of B&B.Uses urinal at bed side and commode for BM. At 1130 pt returned cak to facilty form appointment. Abdominal incision staples removed at doctors office. Incision intact with starestrips. Left PICC double lumen intact and dressing CDI. Both lumens flushing well but no blood return noted. Provider aware of it .Pt was seen by peds vascular for R PICC. Call light with in reach. Continue with POC.      Patient's most recent vital signs are:     Vital signs:  BP: 119/77  Temp: 98.6  HR: 98  RR: 18  SpO2: 97 %     Patient does not have new respiratory symptoms.  Patient does not have new sore throat.  Patient does not have a fever greater than 99.5.           "

## 2023-07-03 NOTE — PROCEDURES
Right EJ non tunneled removed intact and without difficulty at bedside in 4th floor rehab.    Chest x-ray shows left upper extremity PICC retracted into left subclavian vein.

## 2023-07-03 NOTE — PROGRESS NOTES
[facility]    Medicine Progress Note - Hospitalist Service    Date of Admission:  6/25/2023    Assessment & Plan     Damion Quinones is a 67 year old male admitted on 6/25/2023 for ongoing rehabilitation after hospitalization at [place].  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  He underwent  simultaneous liver kidney transplant on 6/6/2023. RTOR on 6/6/203 with concern for low flow in the renal graft - ex-lap, washout, closure with healthy appearing graft with intact arterial and venous flow.  He is hospital course was also complicated by renal failure requiring CRRT and intermittent hemodialysis, RIJ clot, atrial fibrillation, hypertension, fluid overload, hypoxemia, anemia, leukocytosis, malnutrition, steroids/tube feed induced hyperglycemia, gout, deconditioning.  For details of hospitalization please refer to the discharge summary note on 6/25/2023.    Today's changes: 7/3/2023  Overall doing okay.   Ongoing both feet neuropathic pain.   Reviewed follow-up clinic appointment recs from Dr Clifton. ([facility])      1.Graft Function: Liver allograft: no rejection or technical problems.  Mild increase in alkaline phosphatase, will continue to monitor,   Kidney allograft: no rejection or technical problems;   Creatinine is improving  2.Immunosuppression Management: keep tacrolimus levels 5-6 ng/dL, continue cellcept  And keep prednisone at 5 mg po daily  3.Hypertension: ok  4.Renal Function: improving  5.Lab frequency: twice weekly  6.Other:  1. Remove staples, dialysis line and replace PICC line  2. Neurology consult for neuropathy and consider lyrica  3. Add metalazone 5 mg po daily  4. Continue nutritional support and physcial therapy     -- IR consult placed to remove dialysis catheter.   --  PICC RN to troubleshoot picc line, replace if needed.   -- Ordered 1 dose of 5 mg metolazone, pt and significant others insisted on trying today despite soft bp. Monitor bp closely. Bmp in am  -- switch gabapentin to lyrica 25 mg tid.   -- Reduce pred to 5 mg ; reduce lantus to 12 unit(s) hs.   -- Neurology outpatient follow-up request placed in epic.   -- Remove staples when can. Patient care order placed.       # Physical deconditioning:      Plan:  - Consulted PT OT, therapy as tolerated.      # S/p DBD simultaneous liver kidney transplant on 2023 with complex reconstruction of accessory right hepatic artery:  # H/o CKD related IgA nephropathy and ANCA vasculitis s/p  donor renal transplant on 2023 with ureteral/J stent placement, delayed graft function:   * RTOR on  with concern for low flow in the renal graft - ex-lap, washout, closure with healthy appearing graft with intact arterial and venous flow.   * 23 Liver ultrasound with doppler: low RI right and left HA again seen, no significant change. 2 new fluid collections, possible hematoma.   *Stent: No biliary stent in place. Ursodiol 250 mg BID.  * Vascular ppx:  mg daily  * Liver studies elevated-- stable. Monitor MWF  * Last renal US with doppler  with faster than expected iliac vein velocity above the anastomosis may represent edema and elevated superior arcuate artery resistive index. Renal biopsy 2023: ATN, no evidence of rejection  Stop renal replacement therapy: CRRT -. Intermittent HD on .   HD line in-place.   * No plan for renal replacement therapy today  *  Diuretics: Bumex 2 mg PO BID  * Immunosuppression:  Induction: Steroid taper and Thymoglobulin 400 mg (4 mg/kg) complete.   Maintenance:   -  mg BID  - Tacrolimus 2 mg BID. Goal 5-8 given slow renal recovery.  - Resumed PTA prednisone for rheumatoid arthritis (see below). Goal to wean down to 5 mg prednisone for chronic use upon  discharge (will start wean outpatient).  * Infection prophylaxis: PCP (bactrim), viral (Valcyte x 12 weeks)     Transplant coordinator: Cindy Clay (Liver), Angelita Torres (Kidney) [phone]  Donor type: DBD  DSA at time of transplant:  Yes CW9 6/5/23  Ureteral stent: Yes  Biliary stent: Yes  CMV:  Donor - / Recipient +  EBV:  Donor + / Recipient +  Induction: Thymo 3.9 mg/kg (400 mg) and steroid taper.     * Acute post operative pain controlled with low dose oxycodone PRN.    Plan:  - Continue tacrolimus 2 mg twice daily.  Goal 5-6  - Continue  mg twice daily  - Continue prednisone 5 mg daily. (reduced dose)  - Continue Bactrim, Valcyte  - CBC, CMP, mag, Phos, tacro level every Monday Wednesday Friday   * further IS adjustment per Tx team.      # Acute on chronic anemia:     * Hgb stable 7-8 without any s/sx of bleeding.  * Last transfused 6/17.  * Hemoglobin level 7.7 7/3/2023     Plan:  - CBC every Monday Wednesday Friday.  - Transfuse if hemoglobin less than 7 g/dL.        # RIJ clot:   * US 6/15 with partially occlusive inferior R internal jugular thrombus (previously partially occlusive thrombus in right axillary resolved).   * Patient is currently on heparin infusion at low intensity and warfarin.  * INR goal is 1.5-2.   Plan:  - Continue heparin low intensity gtt until INR > 1.5.  inr 2.36. off heparin drip.   - Warfarin dosing per pharmacist     ## Cardiology:  # Coronary artery disease, paroxysmal atrial fibrillation  * Patient was seen by cardiology during inpatient hospital stay.  * Patient is currently on aspirin, atorvastatin, metoprolol and warfarin.  Due to risks of bleeding and INR goal is lowered at 1.5-2     Plan:  - Continue aspirin, metoprolol, atorvastatin  - Continue warfarin.  Pharmacy to dose.     # Severe malnutrition in the context of acute illness:  * Nutrition consulted. NJ removed 6/22 with adequate PO intake.      Plan  -ID consult  -Continue Regular diet with  supplements.     # Steroid/TF induced hyperglycemia:  * HgA1c 6.1% (6/6/2023).  * On insulin glargine 15 units at bedtime, insulin aspart 1 unit per 15 g of carbohydrate, and insulin aspart high intensity correction.     Plan:  - Reduce lantus to 12 units HS as reducing prednisone dose.   - Consider reducing insulin with steroid taper.  Monitor closely  - Consider endocrine consult and diabetic education if patient blood sugar not well controlled     Recent Labs   Lab 07/03/23  1137 07/03/23  0730 07/03/23  0708 07/02/23  2111 07/02/23  1721 07/02/23  1315   * 100* 112* 271* 282* 190*        ## Rheumatology:  # Psoriatic arthritis: Prior to admission was on prednisone 10 mg daily.  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     Plan:  - Continue prednisone as recommended by rheumatology (short taper then prednisone 5 mg daily)   -Continue allopurinol.      # Hepatitis B s Ag +: Noted to be positive pre-transplant on 6/5/23, but not previously positive (1/26/2023). HBV DNA negative on 6/5/23. No clear at risk behavior. Repeat Hep B s Ag was negative and HBV DNA not detected; suspect 6/5/2023 HBsAg positivity was a false positive.      # Hypotension: 6/10 Echo: EF 55-60%. Intermittently required Vasopressors - now off (s/p 6/6-6/12, 6/15-6/17). Midodrine 20 mg Q8H discontinued 6/22.      # Hypoxia: Likely related to fluid overload and deconditioning. Resolved with diuresis and renal replacement therapy.  - IS.      # Encephalopathy: Altered mental status was first noted 6/11/2023. Neurology consulted. Head CT without evidence of acute intra-cranial pathology. MRI/MRA - small area of diffusion restriction on DWI without correlation on ADC. EEG consistent with severe encephalopathy, but no seizures. Etiology of encephalopathy likely multifactorial: worsening uremia and recent bacteremia (although clearance of bacteremia has not resulted to improvement in mental status). No sedating  "medications; no recent opioids. Encephalopathy has improved and patient's mental status is back to baseline in the setting of improving uremia as of 6/16/2023 in the setting of uremia improvement.  currently patient is alert awake and oriented and appropriate.   - Continue to monitor.     # Enterococcus Faecium bacteremia: Treatment completed 6/19.   *6/9 blood cultures positive for enterococcus faecium. No vegetations noted on TTE. Urine culture 6/10/23 without any growth. ID consulted.   * Blood cultures from 6/10, 6/11, 6/12, 6/13, and 6/16 with no growth to date  * Antibiotics: Vancomycin 6/12-6/20, linezolid 6/10-6/12, Zosyn 6/10-6/12.      # Leukocytosis: Afebrile. CT chest 6/16 with bibasilar atelectasis + small bilateral pleural effusion. Patchy densities in the left upper lobe anterolaterally. Infectious workup negative to date. Breathing comfortably on ambient air.      # Alcohol use disorder: Continue sober supports post transplant. Continue complete sobriety.  Plan:  -Social work consult            Pneumococcal vaccination: Patient recently had solid organ transplant.  Will defer vaccination to primary transplant team        Diet: Regular Diet Adult  Snacks/Supplements Adult: Other; Please allow pt/RN to order snacks/supplements PRN; Between Meals    DVT Prophylaxis: Warfarin  Lux Catheter: Not present  Lines:   PICC 06/16/23 Double Lumen Left Brachial vein medial-Site Assessment: WDL  CVC Double Lumen Right External jugular-Site Assessment: WDL      Cardiac Monitoring: None  Code Status: Full Code      Clinically Significant Risk Factors              # Hypoalbuminemia: Lowest albumin = 2.8 g/dL at 6/28/2023  6:44 AM, will monitor as appropriate     # Hypertension: Noted on problem list        # Obesity: Estimated body mass index is 33.08 kg/m  as calculated from the following:    Height as of 6/5/23: 1.702 m (5' 7\").    Weight as of this encounter: 95.8 kg (211 lb 3.2 oz).   # Moderate Malnutrition: " based on nutrition assessment         Disposition Plan     Expected Discharge Date: 07/18/2023                  Kishan Mcneill MD  Hospitalist Service  [facility]  Securely message with [employer] (more info)  Text page via [email or web address] Paging/Directory   ______________________________________________________________________    Interval History   See above.   Interval events reviewed.   States overall doing well  Still with numbness, pins and needles pain both feet.   BLE edema. 2+  Denies fever or chills.   No cough or cp or sob.   No LH or dizziness.   No NV or pain abdomen.   No new sensory or motor complaint.       No other new or acute medical concern      Physical Exam   Vital Signs: Temp: 98.6  F (37  C) Temp src: Oral BP: 93/66 Pulse: 97   Resp: 18 SpO2: 97 % O2 Device: None (Room air)    Weight: 211 lbs 3.21 oz    General Appearance: Awake, interactive, NAD  HEENT: AT/NC, Anicteric, Moist MM  Respiratory: Normal work of breathing. RA  Cardiovascular: RRR  GI/Abd: Soft. Non tender.   Extremities: Distally wwp. BLE edema.++  Neuro: AO x 4, Grossly non focal.   Psychiatry: Stable mood.  R Upper chest HD catheter  L arm picc    Medical Decision Making       45 MINUTES SPENT BY ME on the date of service doing chart review, history, exam, documentation & further activities per the note.      Data     I have personally reviewed the following data over the past 24 hrs:    19.0 (H)  \   7.7 (L)   / 475 (H)     135 (L) 96 (L) 58.5 (H) /  212 (H)   3.8 24 1.99 (H) \       ALT: 57 AST: 32 AP: 270 (H) TBILI: 0.6   ALB: 2.9 (L) TOT PROTEIN: 5.7 (L) LIPASE: N/A       INR:  2.36 (H) PTT:  N/A   D-dimer:  N/A Fibrinogen:  N/A       Imaging results reviewed over the past 24 hrs:   No results found for this or any previous visit (from the past 24 hour(s)).

## 2023-07-03 NOTE — PLAN OF CARE
Patient alert and oriented X4 slept through this shift. Denied any pain or discomfort. Transfer assist X2 via Liko lift. Voiding adequately without concern. PICC line patent. Call light within reach. No acute distress noted. Continue per plan of care.

## 2023-07-04 ENCOUNTER — APPOINTMENT (OUTPATIENT)
Dept: ULTRASOUND IMAGING | Facility: CLINIC | Age: 68
End: 2023-07-04
Attending: INTERNAL MEDICINE
Payer: COMMERCIAL

## 2023-07-04 LAB
ANION GAP SERPL CALCULATED.3IONS-SCNC: 16 MMOL/L (ref 7–15)
BUN SERPL-MCNC: 59.7 MG/DL (ref 8–23)
CALCIUM SERPL-MCNC: 9.1 MG/DL (ref 8.8–10.2)
CHLORIDE SERPL-SCNC: 92 MMOL/L (ref 98–107)
CREAT SERPL-MCNC: 1.96 MG/DL (ref 0.67–1.17)
DEPRECATED HCO3 PLAS-SCNC: 23 MMOL/L (ref 22–29)
GFR SERPL CREATININE-BSD FRML MDRD: 37 ML/MIN/1.73M2
GLUCOSE BLDC GLUCOMTR-MCNC: 108 MG/DL (ref 70–99)
GLUCOSE BLDC GLUCOMTR-MCNC: 155 MG/DL (ref 70–99)
GLUCOSE BLDC GLUCOMTR-MCNC: 206 MG/DL (ref 70–99)
GLUCOSE BLDC GLUCOMTR-MCNC: 227 MG/DL (ref 70–99)
GLUCOSE BLDC GLUCOMTR-MCNC: 264 MG/DL (ref 70–99)
GLUCOSE SERPL-MCNC: 102 MG/DL (ref 70–99)
HOLD SPECIMEN: NORMAL
INR PPP: 2.47 (ref 0.85–1.15)
MAGNESIUM SERPL-MCNC: 1.4 MG/DL (ref 1.7–2.3)
MAGNESIUM SERPL-MCNC: 1.4 MG/DL (ref 1.7–2.3)
MAGNESIUM SERPL-MCNC: 2.3 MG/DL (ref 1.7–2.3)
POTASSIUM SERPL-SCNC: 3.2 MMOL/L (ref 3.4–5.3)
POTASSIUM SERPL-SCNC: 3.6 MMOL/L (ref 3.4–5.3)
SODIUM SERPL-SCNC: 131 MMOL/L (ref 136–145)

## 2023-07-04 PROCEDURE — 250N000013 HC RX MED GY IP 250 OP 250 PS 637: Performed by: INTERNAL MEDICINE

## 2023-07-04 PROCEDURE — 85610 PROTHROMBIN TIME: CPT | Performed by: INTERNAL MEDICINE

## 2023-07-04 PROCEDURE — 84132 ASSAY OF SERUM POTASSIUM: CPT | Performed by: INTERNAL MEDICINE

## 2023-07-04 PROCEDURE — 250N000011 HC RX IP 250 OP 636: Performed by: INTERNAL MEDICINE

## 2023-07-04 PROCEDURE — 120N000009 HC R&B SNF

## 2023-07-04 PROCEDURE — 76882 US LMTD JT/FCL EVL NVASC XTR: CPT | Mod: LT

## 2023-07-04 PROCEDURE — 83735 ASSAY OF MAGNESIUM: CPT | Performed by: INTERNAL MEDICINE

## 2023-07-04 PROCEDURE — 250N000012 HC RX MED GY IP 250 OP 636 PS 637: Performed by: INTERNAL MEDICINE

## 2023-07-04 PROCEDURE — 36415 COLL VENOUS BLD VENIPUNCTURE: CPT | Performed by: INTERNAL MEDICINE

## 2023-07-04 PROCEDURE — 93971 EXTREMITY STUDY: CPT | Mod: 26 | Performed by: RADIOLOGY

## 2023-07-04 PROCEDURE — 93971 EXTREMITY STUDY: CPT | Mod: LT

## 2023-07-04 PROCEDURE — 82435 ASSAY OF BLOOD CHLORIDE: CPT | Performed by: INTERNAL MEDICINE

## 2023-07-04 RX ORDER — MAGNESIUM SULFATE HEPTAHYDRATE 40 MG/ML
4 INJECTION, SOLUTION INTRAVENOUS ONCE
Status: COMPLETED | OUTPATIENT
Start: 2023-07-04 | End: 2023-07-04

## 2023-07-04 RX ORDER — LIDOCAINE 40 MG/G
CREAM TOPICAL
Status: DISCONTINUED | OUTPATIENT
Start: 2023-07-04 | End: 2023-07-08

## 2023-07-04 RX ORDER — POTASSIUM CHLORIDE 750 MG/1
40 TABLET, EXTENDED RELEASE ORAL ONCE
Status: COMPLETED | OUTPATIENT
Start: 2023-07-04 | End: 2023-07-04

## 2023-07-04 RX ORDER — WARFARIN SODIUM 1 MG/1
1 TABLET ORAL
Status: COMPLETED | OUTPATIENT
Start: 2023-07-04 | End: 2023-07-04

## 2023-07-04 RX ORDER — MAGNESIUM SULFATE HEPTAHYDRATE 40 MG/ML
4 INJECTION, SOLUTION INTRAVENOUS ONCE
Status: DISCONTINUED | OUTPATIENT
Start: 2023-07-04 | End: 2023-07-04

## 2023-07-04 RX ADMIN — NYSTATIN 500000 UNITS: 500000 SUSPENSION ORAL at 13:56

## 2023-07-04 RX ADMIN — INSULIN ASPART 1 UNITS: 100 INJECTION, SOLUTION INTRAVENOUS; SUBCUTANEOUS at 14:01

## 2023-07-04 RX ADMIN — NYSTATIN 500000 UNITS: 500000 SUSPENSION ORAL at 20:19

## 2023-07-04 RX ADMIN — TACROLIMUS 2 MG: 1 CAPSULE ORAL at 17:15

## 2023-07-04 RX ADMIN — WARFARIN SODIUM 1 MG: 1 TABLET ORAL at 19:25

## 2023-07-04 RX ADMIN — INSULIN ASPART 2 UNITS: 100 INJECTION, SOLUTION INTRAVENOUS; SUBCUTANEOUS at 21:41

## 2023-07-04 RX ADMIN — MYCOPHENOLATE MOFETIL 750 MG: 250 CAPSULE ORAL at 08:49

## 2023-07-04 RX ADMIN — SULFAMETHOXAZOLE AND TRIMETHOPRIM 1 TABLET: 400; 80 TABLET ORAL at 08:48

## 2023-07-04 RX ADMIN — PANTOPRAZOLE SODIUM 40 MG: 40 TABLET, DELAYED RELEASE ORAL at 06:08

## 2023-07-04 RX ADMIN — MAGNESIUM SULFATE HEPTAHYDRATE 4 G: 40 INJECTION, SOLUTION INTRAVENOUS at 15:03

## 2023-07-04 RX ADMIN — INSULIN ASPART 3 UNITS: 100 INJECTION, SOLUTION INTRAVENOUS; SUBCUTANEOUS at 19:14

## 2023-07-04 RX ADMIN — MYCOPHENOLATE MOFETIL 750 MG: 250 CAPSULE ORAL at 17:15

## 2023-07-04 RX ADMIN — DICLOFENAC SODIUM 4 G: 10 GEL TOPICAL at 20:19

## 2023-07-04 RX ADMIN — POTASSIUM CHLORIDE 40 MEQ: 750 TABLET, EXTENDED RELEASE ORAL at 10:51

## 2023-07-04 RX ADMIN — ACETAMINOPHEN 650 MG: 325 TABLET, FILM COATED ORAL at 13:56

## 2023-07-04 RX ADMIN — MAGNESIUM OXIDE TAB 400 MG (241.3 MG ELEMENTAL MG) 400 MG: 400 (241.3 MG) TAB at 20:19

## 2023-07-04 RX ADMIN — INSULIN ASPART 5 UNITS: 100 INJECTION, SOLUTION INTRAVENOUS; SUBCUTANEOUS at 17:21

## 2023-07-04 RX ADMIN — VALGANCICLOVIR HYDROCHLORIDE 450 MG: 450 TABLET ORAL at 20:18

## 2023-07-04 RX ADMIN — ALLOPURINOL 100 MG: 100 TABLET ORAL at 08:48

## 2023-07-04 RX ADMIN — NYSTATIN 500000 UNITS: 500000 SUSPENSION ORAL at 08:49

## 2023-07-04 RX ADMIN — BUMETANIDE 2 MG: 1 TABLET ORAL at 08:49

## 2023-07-04 RX ADMIN — TACROLIMUS 2 MG: 1 CAPSULE ORAL at 08:48

## 2023-07-04 RX ADMIN — BUMETANIDE 2 MG: 1 TABLET ORAL at 17:15

## 2023-07-04 RX ADMIN — Medication 12.5 MG: at 06:08

## 2023-07-04 RX ADMIN — ATORVASTATIN CALCIUM 20 MG: 10 TABLET, FILM COATED ORAL at 20:18

## 2023-07-04 RX ADMIN — PREDNISONE 5 MG: 5 TABLET ORAL at 08:48

## 2023-07-04 RX ADMIN — Medication 12.5 MG: at 13:56

## 2023-07-04 RX ADMIN — MAGNESIUM OXIDE TAB 400 MG (241.3 MG ELEMENTAL MG) 400 MG: 400 (241.3 MG) TAB at 11:28

## 2023-07-04 RX ADMIN — PREGABALIN 25 MG: 25 CAPSULE ORAL at 20:19

## 2023-07-04 RX ADMIN — NYSTATIN 500000 UNITS: 500000 SUSPENSION ORAL at 17:15

## 2023-07-04 RX ADMIN — PREGABALIN 25 MG: 25 CAPSULE ORAL at 13:56

## 2023-07-04 RX ADMIN — URSODIOL 250 MG: 250 TABLET ORAL at 20:19

## 2023-07-04 RX ADMIN — URSODIOL 250 MG: 250 TABLET ORAL at 08:48

## 2023-07-04 RX ADMIN — HEPARIN, PORCINE (PF) 10 UNIT/ML INTRAVENOUS SYRINGE 10 ML: at 06:26

## 2023-07-04 RX ADMIN — ASPIRIN 325 MG: 325 TABLET, FILM COATED ORAL at 08:48

## 2023-07-04 RX ADMIN — B-COMPLEX W/ C & FOLIC ACID TAB 1 MG 1 TABLET: 1 TAB at 13:56

## 2023-07-04 RX ADMIN — PREGABALIN 25 MG: 25 CAPSULE ORAL at 08:48

## 2023-07-04 RX ADMIN — INSULIN ASPART 6 UNITS: 100 INJECTION, SOLUTION INTRAVENOUS; SUBCUTANEOUS at 08:54

## 2023-07-04 ASSESSMENT — ACTIVITIES OF DAILY LIVING (ADL)
ADLS_ACUITY_SCORE: 36

## 2023-07-04 NOTE — PROGRESS NOTES
Progress note     Patient having persistent pain in his left arm around midline. I discussed the case with nursing staff, midline doesn't have blood return. We will ask vascular team to reassess line and let us know if that is working.   Check left arm US to r/o DVT.   Does patient need midline ?. I will check with transplant surgery if they want the patient to have midline or PICC line.

## 2023-07-04 NOTE — PLAN OF CARE
Potassium lab results today is 3.2, magnesium lab results is 1.4. Notified Marvin Weiner MD about abnormal potassium and magnesium.New orders for RN managed potassium. Replaced potassium 40 meq oral once. Recheck potassium lab draw at 1522 pm. RN went  pt room to administer IV magnesium, pt c/o of pain at L arm midline catheter site radieating down to his hand and fingers .Assessed L arm and L arm midline catheter site no redness, swelling and warm to touch. All neuro signs intact.No heparin and saline flushed. RN updated Marvin Weiner MD. Pt wife and daughter present in room and involved in cares.Pt is on scheduled oral magnisum 400 mg given.At 1200 pt wife and daughter took pt outside for walk. Pt returned back to facility at 1255. Pt refused PIV insertion initially, pt changed his mind agreed for PIV insertion. RN notified provider. At 1300 pt left with his family for lunch RN unable to count carb count, per family patient ate some bread, cashew nuts , meat , no package available to based carb counting/ family unsure also of the quantity patient ate, unable to accurately count carb intake due to this/ carb coverage not given and family was upset even though explained this reason to them .Provider ordered US to r/o DVT results still pending at this time. Flyer RN applied PIV on R fore arm.Replaced magnisum via PIV.Recheck magnesium lab draw at 2100. Call light with in reach. Continue with POC.    [id number]: Recheck potassium is 3.6. at 1740 pt went for US , transportation picked up,pt daughter,and wife accompanied with pt to US.Still waiting for US results at this time. BG's are 264,227, sliding scale and carb coverage insulin given.recheck magnesium lab results is 2.3. Pt c/o of dry skin itching on upper chest, neck and shoulder, applied lotion, pt stated itching relived. Pt is A&O x4. Denied chest pain, SOB, N/V. Pt is up in recliner during this shift. Pt wife and daughter present in room until  2100. PIV on R fore arm intact and dressing CDI.  L arm Mid line cath intact and dressing CDI. No redness, swelling and warm to touch.Call light with in reach. Continue with POC.  Patient's most recent vital signs are:     Vital signs:  BP: 101/62  Temp: 98.2  HR: 105  RR: 16  SpO2: 97 %     Patient does not have new respiratory symptoms.  Patient does not have new sore throat.  Patient does not have a fever greater than 99.5.

## 2023-07-04 NOTE — PLAN OF CARE
Goal Outcome Evaluation:  No acute issues overnight. Pleasant and uses call light appropriately. Has no c/o's throughout shift. Continent using urinal indep.- calls for staff to empty. R EJ catheter removed yesterday - dsg C/D/I. Has DL midline LUE - flushes easily but no blood return / heparin locked. On standard Mg.replacement - lab ordered this AM per protocol. Liko transfer to [facility].        Patient's most recent vital signs are:     Vital signs:  BP: 104/63  Temp: 98.4  HR: 101  RR: 18  SpO2: 99 %     Patient does not have new respiratory symptoms.  Patient does not have new sore throat.  Patient does not have a fever greater than 99.5.

## 2023-07-04 NOTE — PLAN OF CARE
Patient is alert and oriented x4. Able to make needs known to staff. Patient is continent of both bowel and bladder. Voids spontaneously without difficulty. Transfers assist-2 with liko lift. Patient denied Chest pain, SOB, new onset cough and N&V. Diet: Regular diet with good appetite. LUE 2L PICC is intact but not currently in use. See note from vascular team. Dialysis port reported by IR this shift. Dressing to site is CDI. Call-light within reach.    Patient's most recent vital signs are:     Vital signs:  BP: 104/63  Temp: 98.4  HR: 101  RR: 18  SpO2: 99 %     Patient does not have new respiratory symptoms.  Patient does not have new sore throat.  Patient does not have a fever greater than 99.5.

## 2023-07-05 ENCOUNTER — APPOINTMENT (OUTPATIENT)
Dept: PHYSICAL THERAPY | Facility: SKILLED NURSING FACILITY | Age: 68
End: 2023-07-05
Attending: INTERNAL MEDICINE
Payer: COMMERCIAL

## 2023-07-05 ENCOUNTER — APPOINTMENT (OUTPATIENT)
Dept: ULTRASOUND IMAGING | Facility: CLINIC | Age: 68
End: 2023-07-05
Attending: INTERNAL MEDICINE
Payer: COMMERCIAL

## 2023-07-05 ENCOUNTER — APPOINTMENT (OUTPATIENT)
Dept: OCCUPATIONAL THERAPY | Facility: SKILLED NURSING FACILITY | Age: 68
End: 2023-07-05
Attending: INTERNAL MEDICINE
Payer: COMMERCIAL

## 2023-07-05 LAB
ALBUMIN SERPL BCG-MCNC: 2.7 G/DL (ref 3.5–5.2)
ALP SERPL-CCNC: 366 U/L (ref 40–129)
ALT SERPL W P-5'-P-CCNC: 83 U/L (ref 0–70)
ANION GAP SERPL CALCULATED.3IONS-SCNC: 15 MMOL/L (ref 7–15)
AST SERPL W P-5'-P-CCNC: 59 U/L (ref 0–45)
BASOPHILS # BLD MANUAL: 0 10E3/UL (ref 0–0.2)
BASOPHILS NFR BLD MANUAL: 0 %
BILIRUB SERPL-MCNC: 0.7 MG/DL
BUN SERPL-MCNC: 57.9 MG/DL (ref 8–23)
CALCIUM SERPL-MCNC: 8.7 MG/DL (ref 8.8–10.2)
CHLORIDE SERPL-SCNC: 87 MMOL/L (ref 98–107)
CREAT SERPL-MCNC: 1.99 MG/DL (ref 0.67–1.17)
DEPRECATED HCO3 PLAS-SCNC: 27 MMOL/L (ref 22–29)
EOSINOPHIL # BLD MANUAL: 0.2 10E3/UL (ref 0–0.7)
EOSINOPHIL NFR BLD MANUAL: 1 %
ERYTHROCYTE [DISTWIDTH] IN BLOOD BY AUTOMATED COUNT: 18.4 % (ref 10–15)
GFR SERPL CREATININE-BSD FRML MDRD: 37 ML/MIN/1.73M2
GLUCOSE BLDC GLUCOMTR-MCNC: 177 MG/DL (ref 70–99)
GLUCOSE BLDC GLUCOMTR-MCNC: 207 MG/DL (ref 70–99)
GLUCOSE BLDC GLUCOMTR-MCNC: 263 MG/DL (ref 70–99)
GLUCOSE BLDC GLUCOMTR-MCNC: 351 MG/DL (ref 70–99)
GLUCOSE SERPL-MCNC: 157 MG/DL (ref 70–99)
HCT VFR BLD AUTO: 23.9 % (ref 40–53)
HGB BLD-MCNC: 7.5 G/DL (ref 13.3–17.7)
INR PPP: 2.49 (ref 0.85–1.15)
LYMPHOCYTES # BLD MANUAL: 0.2 10E3/UL (ref 0.8–5.3)
LYMPHOCYTES NFR BLD MANUAL: 1 %
MAGNESIUM SERPL-MCNC: 1.9 MG/DL (ref 1.7–2.3)
MCH RBC QN AUTO: 30.4 PG (ref 26.5–33)
MCHC RBC AUTO-ENTMCNC: 31.4 G/DL (ref 31.5–36.5)
MCV RBC AUTO: 97 FL (ref 78–100)
METAMYELOCYTES # BLD MANUAL: 0.2 10E3/UL
METAMYELOCYTES NFR BLD MANUAL: 1 %
MONOCYTES # BLD MANUAL: 1.1 10E3/UL (ref 0–1.3)
MONOCYTES NFR BLD MANUAL: 6 %
MYELOCYTES # BLD MANUAL: 0.2 10E3/UL
MYELOCYTES NFR BLD MANUAL: 1 %
NEUTROPHILS # BLD MANUAL: 15.8 10E3/UL (ref 1.6–8.3)
NEUTROPHILS NFR BLD MANUAL: 88 %
PHOSPHATE SERPL-MCNC: 2.4 MG/DL (ref 2.5–4.5)
PLAT MORPH BLD: ABNORMAL
PLATELET # BLD AUTO: 386 10E3/UL (ref 150–450)
POTASSIUM SERPL-SCNC: 3 MMOL/L (ref 3.4–5.3)
POTASSIUM SERPL-SCNC: 3.9 MMOL/L (ref 3.4–5.3)
PROMYELOCYTES # BLD MANUAL: 0.4 10E3/UL
PROMYELOCYTES NFR BLD MANUAL: 2 %
PROT SERPL-MCNC: 5.7 G/DL (ref 6.4–8.3)
RBC # BLD AUTO: 2.47 10E6/UL (ref 4.4–5.9)
RBC MORPH BLD: ABNORMAL
SODIUM SERPL-SCNC: 129 MMOL/L (ref 136–145)
TACROLIMUS BLD-MCNC: 7.4 UG/L (ref 5–15)
TME LAST DOSE: NORMAL H
TME LAST DOSE: NORMAL H
WBC # BLD AUTO: 18 10E3/UL (ref 4–11)

## 2023-07-05 PROCEDURE — 84100 ASSAY OF PHOSPHORUS: CPT | Performed by: INTERNAL MEDICINE

## 2023-07-05 PROCEDURE — 250N000013 HC RX MED GY IP 250 OP 250 PS 637: Performed by: INTERNAL MEDICINE

## 2023-07-05 PROCEDURE — 250N000012 HC RX MED GY IP 250 OP 636 PS 637: Performed by: NURSE PRACTITIONER

## 2023-07-05 PROCEDURE — 85027 COMPLETE CBC AUTOMATED: CPT | Performed by: INTERNAL MEDICINE

## 2023-07-05 PROCEDURE — 97110 THERAPEUTIC EXERCISES: CPT | Mod: GO

## 2023-07-05 PROCEDURE — 250N000012 HC RX MED GY IP 250 OP 636 PS 637: Performed by: INTERNAL MEDICINE

## 2023-07-05 PROCEDURE — 93975 VASCULAR STUDY: CPT

## 2023-07-05 PROCEDURE — 36415 COLL VENOUS BLD VENIPUNCTURE: CPT | Performed by: INTERNAL MEDICINE

## 2023-07-05 PROCEDURE — 80053 COMPREHEN METABOLIC PANEL: CPT | Performed by: INTERNAL MEDICINE

## 2023-07-05 PROCEDURE — 84132 ASSAY OF SERUM POTASSIUM: CPT | Performed by: INTERNAL MEDICINE

## 2023-07-05 PROCEDURE — 999N000040 HC STATISTIC CONSULT NO CHARGE VASC ACCESS

## 2023-07-05 PROCEDURE — 99310 SBSQ NF CARE HIGH MDM 45: CPT | Performed by: INTERNAL MEDICINE

## 2023-07-05 PROCEDURE — 83735 ASSAY OF MAGNESIUM: CPT | Performed by: INTERNAL MEDICINE

## 2023-07-05 PROCEDURE — 85610 PROTHROMBIN TIME: CPT | Performed by: INTERNAL MEDICINE

## 2023-07-05 PROCEDURE — 80197 ASSAY OF TACROLIMUS: CPT | Performed by: INTERNAL MEDICINE

## 2023-07-05 PROCEDURE — 120N000009 HC R&B SNF

## 2023-07-05 PROCEDURE — 85007 BL SMEAR W/DIFF WBC COUNT: CPT | Performed by: INTERNAL MEDICINE

## 2023-07-05 PROCEDURE — 999N000007 HC SITE CHECK

## 2023-07-05 PROCEDURE — 97535 SELF CARE MNGMENT TRAINING: CPT | Mod: GO

## 2023-07-05 PROCEDURE — 93975 VASCULAR STUDY: CPT | Mod: 26 | Performed by: RADIOLOGY

## 2023-07-05 PROCEDURE — 97530 THERAPEUTIC ACTIVITIES: CPT | Mod: GP | Performed by: PHYSICAL THERAPIST

## 2023-07-05 PROCEDURE — 76705 ECHO EXAM OF ABDOMEN: CPT | Mod: 26 | Performed by: RADIOLOGY

## 2023-07-05 RX ORDER — PREDNISONE 20 MG/1
20 TABLET ORAL DAILY
Status: COMPLETED | OUTPATIENT
Start: 2023-07-06 | End: 2023-07-07

## 2023-07-05 RX ORDER — POTASSIUM CHLORIDE 750 MG/1
40 TABLET, EXTENDED RELEASE ORAL ONCE
Status: COMPLETED | OUTPATIENT
Start: 2023-07-05 | End: 2023-07-05

## 2023-07-05 RX ORDER — POTASSIUM CHLORIDE 750 MG/1
20 TABLET, EXTENDED RELEASE ORAL ONCE
Status: COMPLETED | OUTPATIENT
Start: 2023-07-05 | End: 2023-07-05

## 2023-07-05 RX ORDER — PREDNISONE 5 MG/1
15 TABLET ORAL ONCE
Status: COMPLETED | OUTPATIENT
Start: 2023-07-05 | End: 2023-07-05

## 2023-07-05 RX ADMIN — POTASSIUM CHLORIDE 20 MEQ: 750 TABLET, EXTENDED RELEASE ORAL at 13:19

## 2023-07-05 RX ADMIN — NYSTATIN 500000 UNITS: 500000 SUSPENSION ORAL at 08:25

## 2023-07-05 RX ADMIN — SULFAMETHOXAZOLE AND TRIMETHOPRIM 1 TABLET: 400; 80 TABLET ORAL at 08:29

## 2023-07-05 RX ADMIN — ALLOPURINOL 100 MG: 100 TABLET ORAL at 08:28

## 2023-07-05 RX ADMIN — PREDNISONE 5 MG: 5 TABLET ORAL at 08:33

## 2023-07-05 RX ADMIN — PANTOPRAZOLE SODIUM 40 MG: 40 TABLET, DELAYED RELEASE ORAL at 05:41

## 2023-07-05 RX ADMIN — PREGABALIN 25 MG: 25 CAPSULE ORAL at 13:19

## 2023-07-05 RX ADMIN — MAGNESIUM OXIDE TAB 400 MG (241.3 MG ELEMENTAL MG) 400 MG: 400 (241.3 MG) TAB at 11:31

## 2023-07-05 RX ADMIN — ATORVASTATIN CALCIUM 20 MG: 10 TABLET, FILM COATED ORAL at 22:38

## 2023-07-05 RX ADMIN — URSODIOL 250 MG: 250 TABLET ORAL at 08:36

## 2023-07-05 RX ADMIN — MAGNESIUM OXIDE TAB 400 MG (241.3 MG ELEMENTAL MG) 400 MG: 400 (241.3 MG) TAB at 22:38

## 2023-07-05 RX ADMIN — VALGANCICLOVIR HYDROCHLORIDE 450 MG: 450 TABLET ORAL at 19:48

## 2023-07-05 RX ADMIN — Medication 12.5 MG: at 22:38

## 2023-07-05 RX ADMIN — Medication 2.5 MG: at 11:31

## 2023-07-05 RX ADMIN — INSULIN ASPART 6 UNITS: 100 INJECTION, SOLUTION INTRAVENOUS; SUBCUTANEOUS at 08:39

## 2023-07-05 RX ADMIN — PREGABALIN 25 MG: 25 CAPSULE ORAL at 19:48

## 2023-07-05 RX ADMIN — TACROLIMUS 2 MG: 1 CAPSULE ORAL at 08:30

## 2023-07-05 RX ADMIN — URSODIOL 250 MG: 250 TABLET ORAL at 22:38

## 2023-07-05 RX ADMIN — INSULIN ASPART 5 UNITS: 100 INJECTION, SOLUTION INTRAVENOUS; SUBCUTANEOUS at 12:07

## 2023-07-05 RX ADMIN — WARFARIN SODIUM 0.5 MG: 1 TABLET ORAL at 19:48

## 2023-07-05 RX ADMIN — TACROLIMUS 1.5 MG: 1 CAPSULE ORAL at 19:48

## 2023-07-05 RX ADMIN — INSULIN ASPART 5 UNITS: 100 INJECTION, SOLUTION INTRAVENOUS; SUBCUTANEOUS at 19:53

## 2023-07-05 RX ADMIN — BUMETANIDE 2 MG: 1 TABLET ORAL at 19:48

## 2023-07-05 RX ADMIN — MYCOPHENOLATE MOFETIL 750 MG: 250 CAPSULE ORAL at 08:28

## 2023-07-05 RX ADMIN — MYCOPHENOLATE MOFETIL 750 MG: 250 CAPSULE ORAL at 19:48

## 2023-07-05 RX ADMIN — PREDNISONE 15 MG: 5 TABLET ORAL at 14:51

## 2023-07-05 RX ADMIN — NYSTATIN 500000 UNITS: 500000 SUSPENSION ORAL at 19:48

## 2023-07-05 RX ADMIN — INSULIN ASPART 7 UNITS: 100 INJECTION, SOLUTION INTRAVENOUS; SUBCUTANEOUS at 22:44

## 2023-07-05 RX ADMIN — BUMETANIDE 2 MG: 1 TABLET ORAL at 08:29

## 2023-07-05 RX ADMIN — ASPIRIN 325 MG: 325 TABLET, FILM COATED ORAL at 08:29

## 2023-07-05 RX ADMIN — PREGABALIN 25 MG: 25 CAPSULE ORAL at 08:31

## 2023-07-05 RX ADMIN — B-COMPLEX W/ C & FOLIC ACID TAB 1 MG 1 TABLET: 1 TAB at 11:31

## 2023-07-05 RX ADMIN — Medication 12.5 MG: at 13:19

## 2023-07-05 RX ADMIN — NYSTATIN 500000 UNITS: 500000 SUSPENSION ORAL at 13:19

## 2023-07-05 RX ADMIN — INSULIN ASPART 2 UNITS: 100 INJECTION, SOLUTION INTRAVENOUS; SUBCUTANEOUS at 08:27

## 2023-07-05 RX ADMIN — POTASSIUM CHLORIDE 40 MEQ: 750 TABLET, EXTENDED RELEASE ORAL at 11:29

## 2023-07-05 RX ADMIN — ACETAMINOPHEN 650 MG: 325 TABLET, FILM COATED ORAL at 06:02

## 2023-07-05 ASSESSMENT — ACTIVITIES OF DAILY LIVING (ADL)
ADLS_ACUITY_SCORE: 36

## 2023-07-05 NOTE — PROGRESS NOTES
[facility]    Medicine Progress Note - Hospitalist Service    Date of Admission:  6/25/2023    Assessment & Plan     Damion Quinones is a 67 year old male admitted on 6/25/2023 for ongoing rehabilitation after hospitalization at [facility].  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  He underwent  simultaneous liver kidney transplant on 6/6/2023. RTOR on 6/6/203 with concern for low flow in the renal graft - ex-lap, washout, closure with healthy appearing graft with intact arterial and venous flow.  He is hospital course was also complicated by renal failure requiring CRRT and intermittent hemodialysis, RIJ clot, atrial fibrillation, hypertension, fluid overload, hypoxemia, anemia, leukocytosis, malnutrition, steroids/tube feed induced hyperglycemia, gout and deconditioning.  For details of hospitalization please refer to the discharge summary note on 6/25/2023.    Today's changes: 7/5/2023    Midline is not working. Vascular team removed it. I discussed the case with Transplant surgery team and they were okay removing the line, no need for a new PICC line / midline at this time. Patient got a peripheral IV line.     Patient complaining of gout flare on his hands with pain and swelling on his knuckles. Transplant team okay with Prednisone 20 mg for 3 days, then return to Prednisone 5 mg.     Transplant team requested Liver US.        1.Graft Function: Liver allograft: no rejection or technical problems.  Mild increase in alkaline phosphatase, will continue to monitor,   Kidney allograft: no rejection or technical problems;   Creatinine is improving  2.Immunosuppression Management: keep tacrolimus levels 5-6 ng/dL, continue cellcept  And keep prednisone at 5 mg po daily  3.Hypertension: ok  4.Renal  Function: improving  5.Lab frequency: twice weekly  6.Other:  1. Remove staples, dialysis line and replace PICC line  2. Neurology consult for neuropathy and consider lyrica  3. Add metalazone 5 mg po daily  4. Continue nutritional support and physcial therapy       # Physical deconditioning:   - Continue working with PT and OT.      # S/p DBD simultaneous liver kidney transplant on 2023 with complex reconstruction of accessory right hepatic artery:  # H/o CKD related IgA nephropathy and ANCA vasculitis s/p  donor renal transplant on 2023 with ureteral/J stent placement, delayed graft function:   * RTOR on  with concern for low flow in the renal graft - ex-lap, washout, closure with healthy appearing graft with intact arterial and venous flow.   * 23 Liver ultrasound with doppler: low RI right and left HA again seen, no significant change. 2 new fluid collections, possible hematoma.   *Stent: No biliary stent in place. Ursodiol 250 mg BID.  * Vascular ppx:  mg daily  * Liver studies elevated-- stable. Monitor MWF  * Last renal US with doppler  with faster than expected iliac vein velocity above the anastomosis may represent edema and elevated superior arcuate artery resistive index. Renal biopsy 2023: ATN, no evidence of rejection  Stop renal replacement therapy: CRRT -. Intermittent HD on .   HD line in-place.   * No plan for renal replacement therapy today  *  Diuretics: Bumex 2 mg PO BID  * Immunosuppression:  Induction: Steroid taper and Thymoglobulin 400 mg (4 mg/kg) complete.   Maintenance:   -  mg BID  - Tacrolimus 2 mg BID. Goal 5-8 given slow renal recovery.  - Resumed PTA prednisone for rheumatoid arthritis (see below). Goal to wean down to 5 mg prednisone for chronic use upon discharge (will start wean outpatient).  * Infection prophylaxis: PCP (bactrim), viral (Valcyte x 12 weeks)     Transplant coordinator: Cindy Clay (Liver), Angelita Torres  (Kidney) [phone]  Donor type: DBD  DSA at time of transplant:  Yes CW9 6/5/23  Ureteral stent: Yes  Biliary stent: Yes  CMV:  Donor - / Recipient +  EBV:  Donor + / Recipient +  Induction: Thymo 3.9 mg/kg (400 mg) and steroid taper.     * Acute post operative pain controlled with low dose oxycodone PRN.    Immunosuppression:  Induction: Steroid taper and Thymoglobulin 400 mg (4 mg/kg).   Maintenance:   -  mg BID  - Tacrolimus goal level 5-6 per Dr. Clifton on 7/3. Level 7.4 (14h), reduce dose.  - Prednisone 5mg daily per Dr. Clifton (reduced dose on 7/3). OK to use short prednisone pulse for gout flare.  Infection prophylaxis: PJP (bactrim), viral (Valcyte x 12 weeks)       # Acute on chronic anemia:     * Hgb stable 7-8 without any s/sx of bleeding.  * Last transfused 6/17.  * Hemoglobin level 7.5 7/5/2023     Plan:  - CBC every Monday Wednesday Friday.  - Transfuse if hemoglobin less than 7 g/dL.    # Leucocytosis   -- Steroid related ?. No fever. Continue trending.      # RIJ clot:   * US 6/15 with partially occlusive inferior R internal jugular thrombus (previously partially occlusive thrombus in right axillary resolved).   * Patient is currently on heparin infusion at low intensity and warfarin.  * INR goal is 1.5-2.   Plan:  - Continue heparin low intensity gtt until INR > 1.5.  inr 2.36. off heparin drip.   - Warfarin dosing per pharmacist     # Cardiology:  # Coronary artery disease, paroxysmal atrial fibrillation  * Patient was seen by cardiology during inpatient hospital stay.  * Patient is currently on aspirin, atorvastatin, metoprolol and warfarin.  Due to risks of bleeding and INR goal is lowered at 1.5-2     Plan:  - Continue aspirin, metoprolol, atorvastatin  - Continue warfarin.  Pharmacy to dose.     # Severe malnutrition in the context of acute illness:  * Nutrition consulted. NJ removed 6/22 with adequate PO intake.      Plan  -ID consult  -Continue Regular diet with  supplements.     # Steroid/TF induced hyperglycemia:  * HgA1c 6.1% (6/6/2023).  * On insulin glargine 15 units at bedtime, insulin aspart 1 unit per 12 g of carbohydrate, and insulin aspart high intensity correction.     Plan:  - Continue on Lantus 12 U  - Consider reducing insulin with steroid taper.  Monitor closely  - Consider endocrine consult and diabetic education if patient blood sugar not well controlled     Recent Labs   Lab 07/06/23  1226 07/06/23  1139 07/06/23  0736 07/05/23  2355 07/05/23  2229 07/05/23  1726   * 318* 139* 320* 351* 207*        ## Rheumatology:  # Psoriatic arthritis: Prior to admission was on prednisone 10 mg daily.  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     Plan:  - Continue prednisone as recommended by rheumatology (short taper then prednisone 5 mg daily)   -Continue allopurinol.      # Hepatitis B s Ag +: Noted to be positive pre-transplant on 6/5/23, but not previously positive (1/26/2023). HBV DNA negative on 6/5/23. No clear at risk behavior. Repeat Hep B s Ag was negative and HBV DNA not detected; suspect 6/5/2023 HBsAg positivity was a false positive.      # Hypotension: 6/10 Echo: EF 55-60%. Intermittently required Vasopressors - now off (s/p 6/6-6/12, 6/15-6/17). Midodrine 20 mg Q8H discontinued 6/22.      # Hypoxia: Likely related to fluid overload and deconditioning. Resolved with diuresis and renal replacement therapy.  - IS.      # Encephalopathy: Altered mental status was first noted 6/11/2023. Neurology consulted. Head CT without evidence of acute intra-cranial pathology. MRI/MRA - small area of diffusion restriction on DWI without correlation on ADC. EEG consistent with severe encephalopathy, but no seizures. Etiology of encephalopathy likely multifactorial: worsening uremia and recent bacteremia (although clearance of bacteremia has not resulted to improvement in mental status). No sedating medications; no recent  opioids. Encephalopathy has improved and patient's mental status is back to baseline in the setting of improving uremia as of 6/16/2023 in the setting of uremia improvement.  currently patient is alert awake and oriented and appropriate.   - Continue to monitor.     # Enterococcus Faecium bacteremia: Treatment completed 6/19.   *6/9 blood cultures positive for enterococcus faecium. No vegetations noted on TTE. Urine culture 6/10/23 without any growth. ID consulted.   * Blood cultures from 6/10, 6/11, 6/12, 6/13, and 6/16 with no growth to date  * Antibiotics: Vancomycin 6/12-6/20, linezolid 6/10-6/12, Zosyn 6/10-6/12.      # Leukocytosis: Afebrile. CT chest 6/16 with bibasilar atelectasis + small bilateral pleural effusion. Patchy densities in the left upper lobe anterolaterally. Infectious workup negative to date. Breathing comfortably on ambient air.      # Alcohol use disorder: Continue sober supports post transplant. Continue complete sobriety.  Plan:  -Social work consult      Pneumococcal vaccination: Patient recently had solid organ transplant.  Will defer vaccination to primary transplant team        Diet: Regular Diet Adult  Snacks/Supplements Adult: Other; Please allow pt/RN to order snacks/supplements PRN; Between Meals    DVT Prophylaxis: Warfarin  Lux Catheter: Not present  Lines:      Cardiac Monitoring: None  Code Status: Full Code      Clinically Significant Risk Factors        # Hypokalemia: Lowest K = 3 mmol/L in last 2 days, will replace as needed  # Hyponatremia: Lowest Na = 129 mmol/L in last 2 days, will monitor as appropriate   # Hypercalcemia: corrected calcium is >10.1, will monitor as appropriate  # Hypomagnesemia: Lowest Mg = 1.4 mg/dL in last 2 days, will replace as needed   # Hypoalbuminemia: Lowest albumin = 2.7 g/dL at 7/5/2023  7:07 AM, will monitor as appropriate     # Hypertension: Noted on problem list        # Obesity: Estimated body mass index is 33.08 kg/m  as calculated from  "the following:    Height as of 6/5/23: 1.702 m (5' 7\").    Weight as of this encounter: 95.8 kg (211 lb 3.2 oz).   # Moderate Malnutrition: based on nutrition assessment         Disposition Plan     Expected Discharge Date: 07/18/2023                  Marvin Weiner MD  Hospitalist Service  [facility]  Securely message with [employer] (more info)  Text page via [employer] Paging/Directory   ______________________________________________________________________    Interval History     Acute events as above.   No chest pain, palpitations, shortness of breath, fever, chills or diaphoresis.       Physical Exam   Vital Signs: Temp: 97.9  F (36.6  C) Temp src: Oral BP: 108/75 Pulse: 97   Resp: 16 SpO2: 100 % O2 Device: None (Room air)    Weight: 211 lbs 3.21 oz    General Appearance: Alert, interactive, NAD  Respiratory: Normal work of breathing. RA  Cardiovascular: RRR  GI/Abd: Soft. Non tender.   Extremities: Distally wwp. BLE edema.++. + tenderness to palpation on both hands.   Neuro: AO x 4, Grossly non focal.   Psychiatry: Stable mood.      Medical Decision Making       45 MINUTES SPENT BY ME on the date of service doing chart review, history, exam, documentation & further activities per the note.      Data     I have personally reviewed the following data over the past 24 hrs:    18.0 (H)  \   7.5 (L)   / 386     129 (L) 87 (L) 57.9 (H) /  177 (H)   3.0 (L) 27 1.99 (H) \       ALT: 83 (H) AST: 59 (H) AP: 366 (H) TBILI: 0.7   ALB: 2.7 (L) TOT PROTEIN: 5.7 (L) LIPASE: N/A       INR:  2.49 (H) PTT:  N/A   D-dimer:  N/A Fibrinogen:  N/A       Imaging results reviewed over the past 24 hrs:   Recent Results (from the past 24 hour(s))   US Upper Extremity Venous Duplex Left    Narrative    Exam: Duplex Doppler ultrasound assessment of the upper extremities  veins bilaterally 7/4/2023 6:18 PM    Clinical information: Persistent pain in left upper abdomen line. Rule  out DVT    Ordering provider: Marvin" MD Husam    Comparison:     Technique: B-mode (grayscale) and duplex Doppler ultrasound of the  left upper extremity veins, including compressibility when feasible.  Velocity measurements obtained with angle correct at or less than 60  degrees. Right internal jugular vein was evaluated for symmetry.    Findings:     Right upper extremity:     Internal jugular vein: Partially compressible with chronic appearing  nonocclusive thrombus.    Right upper extremity veins demonstrate normal compressibility,  phasicity, and pulsatility.    Left upper extremity:    Internal jugular vein: Thrombus: No, Phasic: Yes    Innominate vein: Thrombus: No, Phasic: Yes    Subclavian vein proximal: Thrombus: No, Phasic: Yes  Subclavian vein mid: Thrombus: No, Phasic: Yes  Subclavian vein lateral: Thrombus: No, Phasic: Yes    Axillary vein: Thrombus: No, Phasic: Yes    Brachial vein: Thrombus: No    Cephalic vein: Thrombus: No  Basilic vein: Thrombus: No    Left upper extremity veins demonstrate normal compressibility,  phasicity, and pulsatility.    Targeted grayscale imaging of the posterior arm near the PICC line  demonstrates nonspecific subcutaneous edema.      Impression    Impression:    1. Right upper extremity: Chronic nonocclusive thrombus in the right  IJV.    2. Left upper extremity: No DVT in the left upper extremity.    3. Nonspecific subcutaneous edema in the posterior left upper  extremity near the PICC site.    I have personally reviewed the examination and initial interpretation  and I agree with the findings.    LEONORA BARBER MD         SYSTEM ID:  [id number]

## 2023-07-05 NOTE — PROVIDER NOTIFICATION
Dr. Marvin Merrill informed regarding as follows:  - wife's concerned of prednisone dose to 5 mg ( Per wife pt is at 10 mg as his minimum dose per his rheumatology) .  - pt would like to remove his midline because it is causing pain- per Dr. Wong he's going to f/u with the txp team if line is still needed and to inform VAD to reassess line. Writer called the VAT- per team she will come to reassess the line.

## 2023-07-05 NOTE — PLAN OF CARE
Goal Outcome Evaluation:  VSS. Alert and orientedx4. Wife  at the bedside the whole shift. Takes pills whole. Bruises all over including arms, chest, abdomen legs. On warfarin- pls see INR result. Blood draw today- potassium 3.0- potassium replaced po total of 60 meq. Recheck 1700.   VAT paged to reassess midline to L arm- team came and removed midline. Pt and wife was instructed to do warm compress to L arm. Noted pt to have gout flare up(swelling and redness to both knuckles more swollen to R 2nd knuckle). Dr. Wong seen and examined pt. Ordered for 1 time dose of 15 mg prednisone.  BG monitored and sliding scale insulin given.  For liver US- per US pt needs to be NPO except meds for 8hrs- due at 6pm. Writer called US that pt last oral intake was around 9-10am. Dr. Wong made aware of US will be done later      Vital signs:  Temp: 97.9  F (36.6  C) Temp src: Oral BP: 108/75 Pulse: 97   Resp: 16 SpO2: 100 % O2 Device: None (Room air)

## 2023-07-05 NOTE — PLAN OF CARE
Goal Outcome Evaluation:  No acute issues overnight. Appears to be sleeping well as noted during rounds. Uses call light appropriately. Continent using urinal indep.- staff empties. On standard Mg / K+ replacement - labs this AM. Has PIV R forearm. DL midline LUE - had US completed yesterday for pt.c/o pain @ site. Has no c/o's as of yet tonight.    0600 - Pt.c/o continued (L) arm pain and requested to see MD ASAP this AM for removal of midline catheter. Will inform day RN for f/u when MD arrives. Pt.aware. Medicated with Tyl.650mg po - declined Oxycodone.        Patient's most recent vital signs are:     Vital signs:  BP: 100/63  Temp: 98.6  HR: 98  RR: 16  SpO2: 99 %     Patient does not have new respiratory symptoms.  Patient does not have new sore throat.  Patient does not have a fever greater than 99.5.

## 2023-07-05 NOTE — PROGRESS NOTES
Transplant Surgery Immunosuppression Management Note:  Damion Quinones is a 67 year old male with history of cirrhosis (alcohol + hemochromatosis), ESKD (IgA nephropathy + ANCA vasculitis), PAF, obesity, HTN, pre-diabetes, rheumatoid and psoriatic arthritis, and CAD. S/p simultaneous liver and kidney transplant on 6/6/2023 complicated by DGF, AF RVR, shock, gout flare, right internal jugular clot, and malnutrition.    Recommendations:  -US Liver transplant  -OK to use prednisone 20mg for a few days for gout. If further issues, call Transplant Nephrology for advice.     POD # 29    Liver transplant w/ complex reconstruction of accessory right hepatic artery: Transaminases and AP up slightly. No biliary stent. Check US liver (discussed with TCU staff and Dr. White).    Kidney transplant w/ stent, delayed graft function:   Cr 2, stable. 6/20 biopsy: ATN, no rejection.     Immunosuppression:  Induction: Steroid taper and Thymoglobulin 400 mg (4 mg/kg).   Maintenance:   -  mg BID  - Tacrolimus goal level 5-6 per Dr. Clifton on 7/3. Level 7.4 (14h), reduce dose.  - Prednisone 5mg daily per Dr. Clifton (reduced dose on 7/3). OK to use short prednisone pulse for gout flare.  Infection prophylaxis: PJP (bactrim), viral (Valcyte x 12 weeks)    Transplant coordinator: Cindy Clay (Liver), Angelita Torres (Kidney) [phone]  Donor type: DBD  DSA at time of transplant:  Yes CW9 6/5/23  Ureteral stent: Yes  Biliary stent: No    Kim Poe NP  [phone]

## 2023-07-06 ENCOUNTER — APPOINTMENT (OUTPATIENT)
Dept: PHYSICAL THERAPY | Facility: SKILLED NURSING FACILITY | Age: 68
End: 2023-07-06
Attending: INTERNAL MEDICINE
Payer: COMMERCIAL

## 2023-07-06 ENCOUNTER — APPOINTMENT (OUTPATIENT)
Dept: OCCUPATIONAL THERAPY | Facility: SKILLED NURSING FACILITY | Age: 68
End: 2023-07-06
Attending: INTERNAL MEDICINE
Payer: COMMERCIAL

## 2023-07-06 LAB
GLUCOSE BLDC GLUCOMTR-MCNC: 139 MG/DL (ref 70–99)
GLUCOSE BLDC GLUCOMTR-MCNC: 169 MG/DL (ref 70–99)
GLUCOSE BLDC GLUCOMTR-MCNC: 299 MG/DL (ref 70–99)
GLUCOSE BLDC GLUCOMTR-MCNC: 318 MG/DL (ref 70–99)
GLUCOSE BLDC GLUCOMTR-MCNC: 320 MG/DL (ref 70–99)
GLUCOSE BLDC GLUCOMTR-MCNC: 322 MG/DL (ref 70–99)
HOLD SPECIMEN: NORMAL
INR PPP: 2.39 (ref 0.85–1.15)
MAGNESIUM SERPL-MCNC: 1.8 MG/DL (ref 1.7–2.3)

## 2023-07-06 PROCEDURE — 250N000012 HC RX MED GY IP 250 OP 636 PS 637: Performed by: INTERNAL MEDICINE

## 2023-07-06 PROCEDURE — 85610 PROTHROMBIN TIME: CPT | Performed by: INTERNAL MEDICINE

## 2023-07-06 PROCEDURE — 250N000012 HC RX MED GY IP 250 OP 636 PS 637: Performed by: NURSE PRACTITIONER

## 2023-07-06 PROCEDURE — 120N000009 HC R&B SNF

## 2023-07-06 PROCEDURE — 97535 SELF CARE MNGMENT TRAINING: CPT | Mod: GO

## 2023-07-06 PROCEDURE — 250N000013 HC RX MED GY IP 250 OP 250 PS 637: Performed by: INTERNAL MEDICINE

## 2023-07-06 PROCEDURE — 83735 ASSAY OF MAGNESIUM: CPT | Performed by: INTERNAL MEDICINE

## 2023-07-06 PROCEDURE — 97110 THERAPEUTIC EXERCISES: CPT | Mod: GO

## 2023-07-06 PROCEDURE — 36415 COLL VENOUS BLD VENIPUNCTURE: CPT | Performed by: INTERNAL MEDICINE

## 2023-07-06 PROCEDURE — 97530 THERAPEUTIC ACTIVITIES: CPT | Mod: GP | Performed by: PHYSICAL THERAPIST

## 2023-07-06 RX ADMIN — Medication 12.5 MG: at 21:47

## 2023-07-06 RX ADMIN — INSULIN ASPART 7 UNITS: 100 INJECTION, SOLUTION INTRAVENOUS; SUBCUTANEOUS at 19:07

## 2023-07-06 RX ADMIN — B-COMPLEX W/ C & FOLIC ACID TAB 1 MG 1 TABLET: 1 TAB at 12:44

## 2023-07-06 RX ADMIN — MAGNESIUM OXIDE TAB 400 MG (241.3 MG ELEMENTAL MG) 400 MG: 400 (241.3 MG) TAB at 12:44

## 2023-07-06 RX ADMIN — NYSTATIN 500000 UNITS: 500000 SUSPENSION ORAL at 09:13

## 2023-07-06 RX ADMIN — MYCOPHENOLATE MOFETIL 750 MG: 250 CAPSULE ORAL at 09:09

## 2023-07-06 RX ADMIN — PANTOPRAZOLE SODIUM 40 MG: 40 TABLET, DELAYED RELEASE ORAL at 05:20

## 2023-07-06 RX ADMIN — VALGANCICLOVIR HYDROCHLORIDE 450 MG: 450 TABLET ORAL at 21:46

## 2023-07-06 RX ADMIN — BUMETANIDE 2 MG: 1 TABLET ORAL at 09:12

## 2023-07-06 RX ADMIN — MAGNESIUM OXIDE TAB 400 MG (241.3 MG ELEMENTAL MG) 400 MG: 400 (241.3 MG) TAB at 21:45

## 2023-07-06 RX ADMIN — WARFARIN SODIUM 0.5 MG: 1 TABLET ORAL at 19:10

## 2023-07-06 RX ADMIN — SULFAMETHOXAZOLE AND TRIMETHOPRIM 1 TABLET: 400; 80 TABLET ORAL at 09:10

## 2023-07-06 RX ADMIN — URSODIOL 250 MG: 250 TABLET ORAL at 21:46

## 2023-07-06 RX ADMIN — INSULIN ASPART 2 UNITS: 100 INJECTION, SOLUTION INTRAVENOUS; SUBCUTANEOUS at 19:08

## 2023-07-06 RX ADMIN — ATORVASTATIN CALCIUM 20 MG: 10 TABLET, FILM COATED ORAL at 21:46

## 2023-07-06 RX ADMIN — Medication 12.5 MG: at 05:20

## 2023-07-06 RX ADMIN — PREDNISONE 20 MG: 20 TABLET ORAL at 09:12

## 2023-07-06 RX ADMIN — NYSTATIN 500000 UNITS: 500000 SUSPENSION ORAL at 21:47

## 2023-07-06 RX ADMIN — INSULIN ASPART 4 UNITS: 100 INJECTION, SOLUTION INTRAVENOUS; SUBCUTANEOUS at 09:08

## 2023-07-06 RX ADMIN — INSULIN ASPART 7 UNITS: 100 INJECTION, SOLUTION INTRAVENOUS; SUBCUTANEOUS at 12:45

## 2023-07-06 RX ADMIN — PREGABALIN 25 MG: 25 CAPSULE ORAL at 14:00

## 2023-07-06 RX ADMIN — TACROLIMUS 1.5 MG: 1 CAPSULE ORAL at 09:09

## 2023-07-06 RX ADMIN — TACROLIMUS 1.5 MG: 1 CAPSULE ORAL at 19:09

## 2023-07-06 RX ADMIN — ALLOPURINOL 100 MG: 100 TABLET ORAL at 09:12

## 2023-07-06 RX ADMIN — PREGABALIN 25 MG: 25 CAPSULE ORAL at 21:46

## 2023-07-06 RX ADMIN — URSODIOL 250 MG: 250 TABLET ORAL at 09:10

## 2023-07-06 RX ADMIN — NYSTATIN 500000 UNITS: 500000 SUSPENSION ORAL at 12:44

## 2023-07-06 RX ADMIN — MYCOPHENOLATE MOFETIL 750 MG: 250 CAPSULE ORAL at 19:10

## 2023-07-06 RX ADMIN — NYSTATIN 500000 UNITS: 500000 SUSPENSION ORAL at 17:33

## 2023-07-06 RX ADMIN — ASPIRIN 325 MG: 325 TABLET, FILM COATED ORAL at 09:12

## 2023-07-06 RX ADMIN — PREGABALIN 25 MG: 25 CAPSULE ORAL at 09:12

## 2023-07-06 RX ADMIN — INSULIN ASPART 5 UNITS: 100 INJECTION, SOLUTION INTRAVENOUS; SUBCUTANEOUS at 21:47

## 2023-07-06 ASSESSMENT — ACTIVITIES OF DAILY LIVING (ADL)
ADLS_ACUITY_SCORE: 36

## 2023-07-06 NOTE — CARE PLAN
RN: Denies need for prn pain meds. States prednisone is decreasing pain and swelling in marcos hands/knuckles.   Pt wife here and very supportive to pt, active in plan of care.   Metoprolol held, pt asymp, provider updated via electronic sticky note. 88/56, . Assist of 2 ceiling lift transfer.  Abd incision EDUARDO with steri strips. Right shin abrasion cleansed and tegaderm applied. No electrolyte replacements needed this shift, labs check tomorrow morning.     Patient's most recent vital signs are:     Vital signs:  BP: 81/56  Temp: 96.2  HR: 110  RR: 18  SpO2: 98 %     Patient does not have new respiratory symptoms.  Patient does not have new sore throat.  Patient does not have a fever greater than 99.5.

## 2023-07-06 NOTE — PLAN OF CARE
Patient alert and oriented. Slept through this shift. Denied any pain or discomfort. Utilizing the urinal this shift. Voiding adequately without concern. BG-320 this shift. Rt. PIV intact and flushes well. No chest pain or SOB voiced. Resting in bed with no acute distress noted. Call light within reach. Continue per plan or care.

## 2023-07-06 NOTE — PROGRESS NOTES
Weekly Update     07/06/23 0688   Appointment Info   Signing Clinician's Name / Credentials (OT) KRIS Duarte   OT Goals   OT Goals   (Goals reviewed and modified.  LOS extended due to pain noted on 7/5.  Less pain 7/6 and pt is progressing.  Pt demo good cognition past couple of days and decrease cognition does not appear to be a barrier to discharge.)   OT: Hygiene/Grooming modified independent;within precautions   OT: Upper Body Dressing Modified independent;within precautions   OT: Lower Body Dressing Modified independent;within precautions   OT: Upper Body Bathing Supervision/stand-by assist;within precautions   OT: Lower Body Bathing Modified independent;with precautions   OT: Toilet Transfer/Toileting Modified independent;within precautions   OT: Meal Preparation Modified independent;within precautions   OT: Home Management Modified independent;within precautions   OT: Cognitive Completed   Self-Care/Home Management   Self-Care/Home Mgmt/ADL, Compensatory, Meal Prep Minutes (18228) 20   Treatment Detail/Skilled Intervention OT: Th provided boaz stedy and min A of 2 STS to go from recliner to and from w/c.  Pt worked on STS at counter height table with min A of 2. Pt able to stand for less than a minute x3.   Therapeutic Procedures/Exercise   Therapeutic Procedure: strength, endurance, ROM, flexibillity minutes (60119) 25   Treatment Detail/Skilled Intervention OT:  Pt participated in reaching task while seated edge of w/c unsupported, he reached with 2# resistance and multiple rests and only a couple rest breaks.  When at the counter top height table, pt stood for less than a minute x3 but overall big improvement to get into the standing position.   OT Discharge Planning   OT Plan OT: abd precautions, fall, monitor cognition, Tx: EOB/EOM for core/trunk strength, progress STS with boaz steady, monitor cognition, ADLs from w/c/recliner/EOB, trial bed-based bridging for LBD, bring red theraband for ex,  G/H seated in boaz stedy.  STS at counter height table with min A of 2 to work on standing tolerance (use dycem near corners of table for pt to  and pull self up to standing).   OT Brief overview of current status Good effort with OT today, pt feeling better and reports he is on prednisone and gout medication so less pain.  Progress with STS, consistently min A of 2 at boaz stedy and counter height table (th put dycem near corners of table for pt to have some  pulling self up into standing position.  Needs to progress toward SPT.  POC remains appropriate.   Total Session Time   Timed Code Treatment Minutes 45   Total Session Time (sum of timed and untimed services) 45   Post Acute Settings Only   What unit is patient on? TCU     Progress noted with STS, consistently min A of 2 vs mod A of 2, during OT today.  Pt medicated for gout and LUE picc removed with less c/o pain.  Th will try assist of one at the counter height table tomorrow and/or on the weekend.  Plan to continue monitoring pt's progress and LOS for readiness to return home with family support and outside services, at this time he is on track for last OT session on 7/26.

## 2023-07-06 NOTE — PROGRESS NOTES
Status at Admission - 6/26/23 Last update - 6/29/23 Current Status 7/6/23   Bed Mobility   Min A for rolling with use of bed rail, mod A for supine to sit use of bed rails, increased time, LE and trunk support and coordination   unchanged   unchanged   Transfer   Sit to stand: max A x 1, Mod A x 1 for simple standing from elevated bed with FWW, pt struggles to appropriately coordinate task    Max A x 1, Mod A x 1 for simple standing from elevated bed with FWW. Pt is unable to safely coordinate task and hold on to walker. Removed the walker and was able to coordinate partial stand pivot with same assist levels. Premature sit and repositioning needed   Mod A x 2 for sit to stand and bed to chair transfer using Boaz Steady, sit to  parallel bars with Max A x 1, pt unable to obtain fully upright posture but is improved from admission   Pt has been performing standing frame for up to 5 min at a time, tolerating well. Pt continually shifting weight side to side and forward backward, able to unweight himself from sling.  Mod A x 2 for transfers using obaz steady   Gait   unable   unable   unable   Stairs   unable   unable   unable   Wheelchair Propulsion   dependent   dependent   dependent         Assessment of Progress Since Last Update: Patient has demonstrated improved performance during therapy as his medical conditions continue to be addressed including ongoing pain in feet and arms. Pt has been working through pain for improved standing tolerance with goal of transitioning to ambulating with lift pants next week.   Barriers to Progress: Recovery from recent surgery is painful and impacts tolerance for activity. Patient has pain in both feet that hurts when touched and when standing.    Proposed Solutions to Improve Barriers: Anticipate patient will improved with continued therapy and patient will continue to feel better as he recovers from surgery. Medical team is addressing foot pain.   Justification for  Continued Therapy Services: Patient has siginifcant deconditioning that was present leading up to his transplants. He will require physical therapy to improve activity tolerance, functional mobility and strengthening to return to high level of function, reduce caregiver burden and to reduce risk of rehospitilization.   Additional Considerations for Safe and Efficient Discharge: Patient may need wheelchair depending on progress, will continue to assess.

## 2023-07-06 NOTE — PLAN OF CARE
Goal Outcome Evaluation:  Patient and wife went down to get some fresh air and aware that patient will be having CONSUELO at 1800, still aware that he is NPO. Blood sugar took at 1726 but wife still do not want Insulin to be covered wanted it to be later after CONSUELO. When patient came back from CONSUELO patient talk to writer to gave all medications later after patient ate. Sliding scale insulin coverage was not given d/t it is already 3 hours after taking blood sugar just gave the carb counting. Uses urinal at bedside.Blood sugar at  is 351,gave the 7 units and endorsed to the [facility] nurse to rechecked blood sugar at 2330.Potassium was replaced at AM lab rechecked, K 3.9. Call light with in reach. Continue with current plan of care.    Blood pressure 96/64, pulse 102, temperature (!) 96.4  F (35.8  C), temperature source Oral, resp. rate 17, weight 95.8 kg (211 lb 3.2 oz), SpO2 96 %.

## 2023-07-07 ENCOUNTER — APPOINTMENT (OUTPATIENT)
Dept: PHYSICAL THERAPY | Facility: SKILLED NURSING FACILITY | Age: 68
End: 2023-07-07
Attending: INTERNAL MEDICINE
Payer: COMMERCIAL

## 2023-07-07 ENCOUNTER — APPOINTMENT (OUTPATIENT)
Dept: OCCUPATIONAL THERAPY | Facility: SKILLED NURSING FACILITY | Age: 68
End: 2023-07-07
Attending: INTERNAL MEDICINE
Payer: COMMERCIAL

## 2023-07-07 LAB
ALBUMIN SERPL BCG-MCNC: 2.6 G/DL (ref 3.5–5.2)
ALP SERPL-CCNC: 352 U/L (ref 40–129)
ALT SERPL W P-5'-P-CCNC: 68 U/L (ref 0–70)
ANION GAP SERPL CALCULATED.3IONS-SCNC: 17 MMOL/L (ref 7–15)
AST SERPL W P-5'-P-CCNC: 45 U/L (ref 0–45)
BASOPHILS # BLD MANUAL: 0 10E3/UL (ref 0–0.2)
BASOPHILS NFR BLD MANUAL: 0 %
BILIRUB SERPL-MCNC: 0.6 MG/DL
BUN SERPL-MCNC: 67.9 MG/DL (ref 8–23)
CALCIUM SERPL-MCNC: 9.1 MG/DL (ref 8.8–10.2)
CHLORIDE SERPL-SCNC: 90 MMOL/L (ref 98–107)
CREAT SERPL-MCNC: 1.78 MG/DL (ref 0.67–1.17)
DACRYOCYTES BLD QL SMEAR: SLIGHT
DEPRECATED HCO3 PLAS-SCNC: 22 MMOL/L (ref 22–29)
EOSINOPHIL # BLD MANUAL: 0 10E3/UL (ref 0–0.7)
EOSINOPHIL NFR BLD MANUAL: 0 %
ERYTHROCYTE [DISTWIDTH] IN BLOOD BY AUTOMATED COUNT: 18.1 % (ref 10–15)
GFR SERPL CREATININE-BSD FRML MDRD: 42 ML/MIN/1.73M2
GLUCOSE BLDC GLUCOMTR-MCNC: 119 MG/DL (ref 70–99)
GLUCOSE BLDC GLUCOMTR-MCNC: 211 MG/DL (ref 70–99)
GLUCOSE BLDC GLUCOMTR-MCNC: 260 MG/DL (ref 70–99)
GLUCOSE BLDC GLUCOMTR-MCNC: 292 MG/DL (ref 70–99)
GLUCOSE SERPL-MCNC: 218 MG/DL (ref 70–99)
HCT VFR BLD AUTO: 22.9 % (ref 40–53)
HGB BLD-MCNC: 7.1 G/DL (ref 13.3–17.7)
INR PPP: 1.91 (ref 0.85–1.15)
LYMPHOCYTES # BLD MANUAL: 0.1 10E3/UL (ref 0.8–5.3)
LYMPHOCYTES NFR BLD MANUAL: 1 %
MAGNESIUM SERPL-MCNC: 1.7 MG/DL (ref 1.7–2.3)
MCH RBC QN AUTO: 30.1 PG (ref 26.5–33)
MCHC RBC AUTO-ENTMCNC: 31 G/DL (ref 31.5–36.5)
MCV RBC AUTO: 97 FL (ref 78–100)
METAMYELOCYTES # BLD MANUAL: 0.1 10E3/UL
METAMYELOCYTES NFR BLD MANUAL: 1 %
MONOCYTES # BLD MANUAL: 0.6 10E3/UL (ref 0–1.3)
MONOCYTES NFR BLD MANUAL: 4 %
NEUTROPHILS # BLD MANUAL: 13.7 10E3/UL (ref 1.6–8.3)
NEUTROPHILS NFR BLD MANUAL: 94 %
PHOSPHATE SERPL-MCNC: 2.5 MG/DL (ref 2.5–4.5)
PLAT MORPH BLD: ABNORMAL
PLATELET # BLD AUTO: 342 10E3/UL (ref 150–450)
POTASSIUM SERPL-SCNC: 3 MMOL/L (ref 3.4–5.3)
POTASSIUM SERPL-SCNC: 4.2 MMOL/L (ref 3.4–5.3)
PROT SERPL-MCNC: 5.9 G/DL (ref 6.4–8.3)
RBC # BLD AUTO: 2.36 10E6/UL (ref 4.4–5.9)
RBC MORPH BLD: ABNORMAL
SODIUM SERPL-SCNC: 129 MMOL/L (ref 136–145)
TACROLIMUS BLD-MCNC: 5.3 UG/L (ref 5–15)
TME LAST DOSE: NORMAL H
TME LAST DOSE: NORMAL H
WBC # BLD AUTO: 14.6 10E3/UL (ref 4–11)

## 2023-07-07 PROCEDURE — 83735 ASSAY OF MAGNESIUM: CPT | Performed by: INTERNAL MEDICINE

## 2023-07-07 PROCEDURE — 250N000013 HC RX MED GY IP 250 OP 250 PS 637: Performed by: INTERNAL MEDICINE

## 2023-07-07 PROCEDURE — 84100 ASSAY OF PHOSPHORUS: CPT | Performed by: INTERNAL MEDICINE

## 2023-07-07 PROCEDURE — 97110 THERAPEUTIC EXERCISES: CPT | Mod: GP | Performed by: PHYSICAL THERAPIST

## 2023-07-07 PROCEDURE — 36415 COLL VENOUS BLD VENIPUNCTURE: CPT | Performed by: INTERNAL MEDICINE

## 2023-07-07 PROCEDURE — 97530 THERAPEUTIC ACTIVITIES: CPT | Mod: GP | Performed by: PHYSICAL THERAPIST

## 2023-07-07 PROCEDURE — 97535 SELF CARE MNGMENT TRAINING: CPT | Mod: GO

## 2023-07-07 PROCEDURE — 84132 ASSAY OF SERUM POTASSIUM: CPT | Performed by: INTERNAL MEDICINE

## 2023-07-07 PROCEDURE — 80053 COMPREHEN METABOLIC PANEL: CPT | Performed by: INTERNAL MEDICINE

## 2023-07-07 PROCEDURE — 250N000012 HC RX MED GY IP 250 OP 636 PS 637: Performed by: INTERNAL MEDICINE

## 2023-07-07 PROCEDURE — 85007 BL SMEAR W/DIFF WBC COUNT: CPT | Performed by: INTERNAL MEDICINE

## 2023-07-07 PROCEDURE — 85610 PROTHROMBIN TIME: CPT | Performed by: INTERNAL MEDICINE

## 2023-07-07 PROCEDURE — 80197 ASSAY OF TACROLIMUS: CPT | Performed by: INTERNAL MEDICINE

## 2023-07-07 PROCEDURE — 85027 COMPLETE CBC AUTOMATED: CPT | Performed by: INTERNAL MEDICINE

## 2023-07-07 PROCEDURE — 250N000012 HC RX MED GY IP 250 OP 636 PS 637: Performed by: NURSE PRACTITIONER

## 2023-07-07 PROCEDURE — 120N000009 HC R&B SNF

## 2023-07-07 PROCEDURE — 97110 THERAPEUTIC EXERCISES: CPT | Mod: GO

## 2023-07-07 RX ORDER — POLYETHYLENE GLYCOL 3350 17 G/17G
17 POWDER, FOR SOLUTION ORAL ONCE
Status: COMPLETED | OUTPATIENT
Start: 2023-07-07 | End: 2023-07-07

## 2023-07-07 RX ORDER — POTASSIUM CHLORIDE 750 MG/1
40 TABLET, EXTENDED RELEASE ORAL ONCE
Status: COMPLETED | OUTPATIENT
Start: 2023-07-07 | End: 2023-07-07

## 2023-07-07 RX ORDER — POTASSIUM CHLORIDE 750 MG/1
20 TABLET, EXTENDED RELEASE ORAL ONCE
Status: COMPLETED | OUTPATIENT
Start: 2023-07-07 | End: 2023-07-07

## 2023-07-07 RX ADMIN — POTASSIUM CHLORIDE 40 MEQ: 750 TABLET, EXTENDED RELEASE ORAL at 13:19

## 2023-07-07 RX ADMIN — INSULIN ASPART 5 UNITS: 100 INJECTION, SOLUTION INTRAVENOUS; SUBCUTANEOUS at 18:57

## 2023-07-07 RX ADMIN — MYCOPHENOLATE MOFETIL 750 MG: 250 CAPSULE ORAL at 17:44

## 2023-07-07 RX ADMIN — PANTOPRAZOLE SODIUM 40 MG: 40 TABLET, DELAYED RELEASE ORAL at 06:14

## 2023-07-07 RX ADMIN — INSULIN ASPART 5 UNITS: 100 INJECTION, SOLUTION INTRAVENOUS; SUBCUTANEOUS at 17:46

## 2023-07-07 RX ADMIN — NYSTATIN 500000 UNITS: 500000 SUSPENSION ORAL at 17:45

## 2023-07-07 RX ADMIN — POTASSIUM CHLORIDE 20 MEQ: 750 TABLET, EXTENDED RELEASE ORAL at 15:07

## 2023-07-07 RX ADMIN — SULFAMETHOXAZOLE AND TRIMETHOPRIM 1 TABLET: 400; 80 TABLET ORAL at 08:38

## 2023-07-07 RX ADMIN — PREGABALIN 25 MG: 25 CAPSULE ORAL at 21:04

## 2023-07-07 RX ADMIN — MYCOPHENOLATE MOFETIL 750 MG: 250 CAPSULE ORAL at 08:34

## 2023-07-07 RX ADMIN — INSULIN ASPART 4 UNITS: 100 INJECTION, SOLUTION INTRAVENOUS; SUBCUTANEOUS at 22:06

## 2023-07-07 RX ADMIN — VALGANCICLOVIR HYDROCHLORIDE 450 MG: 450 TABLET ORAL at 21:04

## 2023-07-07 RX ADMIN — INSULIN ASPART 10 UNITS: 100 INJECTION, SOLUTION INTRAVENOUS; SUBCUTANEOUS at 08:40

## 2023-07-07 RX ADMIN — POLYETHYLENE GLYCOL 3350 17 G: 17 POWDER, FOR SOLUTION ORAL at 15:07

## 2023-07-07 RX ADMIN — PREGABALIN 25 MG: 25 CAPSULE ORAL at 08:38

## 2023-07-07 RX ADMIN — PREDNISONE 20 MG: 20 TABLET ORAL at 08:38

## 2023-07-07 RX ADMIN — NYSTATIN 500000 UNITS: 500000 SUSPENSION ORAL at 08:46

## 2023-07-07 RX ADMIN — INSULIN ASPART 4 UNITS: 100 INJECTION, SOLUTION INTRAVENOUS; SUBCUTANEOUS at 13:17

## 2023-07-07 RX ADMIN — INSULIN ASPART 3 UNITS: 100 INJECTION, SOLUTION INTRAVENOUS; SUBCUTANEOUS at 13:16

## 2023-07-07 RX ADMIN — ATORVASTATIN CALCIUM 20 MG: 10 TABLET, FILM COATED ORAL at 21:02

## 2023-07-07 RX ADMIN — MAGNESIUM OXIDE TAB 400 MG (241.3 MG ELEMENTAL MG) 400 MG: 400 (241.3 MG) TAB at 13:19

## 2023-07-07 RX ADMIN — TACROLIMUS 1.5 MG: 1 CAPSULE ORAL at 10:20

## 2023-07-07 RX ADMIN — TACROLIMUS 1.5 MG: 1 CAPSULE ORAL at 17:44

## 2023-07-07 RX ADMIN — PREGABALIN 25 MG: 25 CAPSULE ORAL at 13:19

## 2023-07-07 RX ADMIN — Medication 0.5 MG: at 17:44

## 2023-07-07 RX ADMIN — NYSTATIN 500000 UNITS: 500000 SUSPENSION ORAL at 21:09

## 2023-07-07 RX ADMIN — URSODIOL 250 MG: 250 TABLET ORAL at 21:03

## 2023-07-07 RX ADMIN — Medication 12.5 MG: at 06:14

## 2023-07-07 RX ADMIN — Medication 12.5 MG: at 21:04

## 2023-07-07 RX ADMIN — URSODIOL 250 MG: 250 TABLET ORAL at 08:37

## 2023-07-07 RX ADMIN — ASPIRIN 325 MG: 325 TABLET, FILM COATED ORAL at 08:34

## 2023-07-07 RX ADMIN — BUMETANIDE 2 MG: 1 TABLET ORAL at 17:44

## 2023-07-07 RX ADMIN — MAGNESIUM OXIDE TAB 400 MG (241.3 MG ELEMENTAL MG) 400 MG: 400 (241.3 MG) TAB at 21:04

## 2023-07-07 RX ADMIN — ALLOPURINOL 100 MG: 100 TABLET ORAL at 08:38

## 2023-07-07 RX ADMIN — NYSTATIN 500000 UNITS: 500000 SUSPENSION ORAL at 13:19

## 2023-07-07 RX ADMIN — B-COMPLEX W/ C & FOLIC ACID TAB 1 MG 1 TABLET: 1 TAB at 13:19

## 2023-07-07 RX ADMIN — DICLOFENAC SODIUM 4 G: 10 GEL TOPICAL at 15:09

## 2023-07-07 ASSESSMENT — ACTIVITIES OF DAILY LIVING (ADL)
ADLS_ACUITY_SCORE: 36

## 2023-07-07 NOTE — PLAN OF CARE
Goal Outcome Evaluation:       Patient is on his chair during the shift.Alert and oriented x 4. Uses urinal. On blood sugar check with sliding scale insulin. BLE is still edematous, continuously elevating it. Both knuckles is swollen patient stated it felt much better.Ate with good appetite.Had a bed bath today. Liko lift.Can make needs known. Call light with in reach. Continue with current plan of care.     and 322.    Blood pressure 117/70, pulse 114, temperature 96.8  F (36  C), temperature source Oral, resp. rate 18, weight 95.8 kg (211 lb 3.2 oz), SpO2 97 %.

## 2023-07-07 NOTE — PROGRESS NOTES
/65   Pulse 104   Temp 98  F (36.7  C) (Oral)   Resp 16   Wt 96.5 kg (212 lb 11.9 oz)   SpO2 100%   BMI 33.32 kg/m       A&O x 4. Pt called approprielty and cooperated with cares. Continued pain and swlling in extremeteis especailly hands due to gout. Pt reports slow improvement with prednisone. NA weighed pt in AM. Tacrolimus missing in AM. Messaged pharmacy who sent it up later in the morning and we gave it when it arrived. Bumex held per BP perameters. Refused Miralax in AM but took it later in the afternoon. Last BM yesterday. Pt out of bed and up into chair in AM. Lab result for Mag ok, potasium low so replacement given and lab recheck ordered per protocol. Afternoon metoprolol held due to BP perameters. Pt had visit from spouse and had food from home for lunch.  / 211.

## 2023-07-07 NOTE — PROGRESS NOTES
Transplant Surgery Immunosuppression Management Note:  Damion Quinones is a 67 year old male with history of cirrhosis (alcohol + hemochromatosis), ESKD (IgA nephropathy + ANCA vasculitis), PAF, obesity, HTN, pre-diabetes, rheumatoid and psoriatic arthritis, and CAD. S/p simultaneous liver and kidney transplant on 6/6/2023 complicated by DGF, AF RVR, shock, gout flare, right internal jugular clot, and malnutrition.      POD # 31    Liver transplant w/ complex reconstruction of accessory right hepatic artery: No biliary stent. US liver on 7/5 due to increased transaminases. No acute findings on US, normal doppler. AST/ALT/Alk phos decreased today.    Kidney transplant w/ stent, delayed graft function:   Cr 1.8, stable. 6/20 biopsy: ATN, no rejection.     Immunosuppression:  Induction: Steroid taper and Thymoglobulin 400 mg (4 mg/kg).   Maintenance:   -  mg BID  - Tacrolimus goal level 5-6 per Dr. Clifton on 7/3. Level in process. Will review and document if change made to dose.   - Prednisone 5mg daily per Dr. Clifton (reduced dose on 7/3).   -Prednisone 20 mg x 3 days (7/5-7/7) for gout flare.  Infection prophylaxis: PJP (bactrim), viral (Valcyte x 12 weeks)    Transplant coordinator: Cindy Clay (Liver), Angelita Torres (Kidney) [phone]  Donor type: DBD  DSA at time of transplant:  Yes CW9 6/5/23  Ureteral stent: Yes  Biliary stent: No    Shannon Delacruz, PAC  [phone]

## 2023-07-08 ENCOUNTER — APPOINTMENT (OUTPATIENT)
Dept: OCCUPATIONAL THERAPY | Facility: SKILLED NURSING FACILITY | Age: 68
End: 2023-07-08
Attending: INTERNAL MEDICINE
Payer: COMMERCIAL

## 2023-07-08 LAB
ERYTHROCYTE [DISTWIDTH] IN BLOOD BY AUTOMATED COUNT: 17.8 % (ref 10–15)
GLUCOSE BLDC GLUCOMTR-MCNC: 127 MG/DL (ref 70–99)
GLUCOSE BLDC GLUCOMTR-MCNC: 155 MG/DL (ref 70–99)
GLUCOSE BLDC GLUCOMTR-MCNC: 226 MG/DL (ref 70–99)
GLUCOSE BLDC GLUCOMTR-MCNC: 319 MG/DL (ref 70–99)
HCT VFR BLD AUTO: 23.2 % (ref 40–53)
HGB BLD-MCNC: 7.3 G/DL (ref 13.3–17.7)
INR PPP: 1.75 (ref 0.85–1.15)
MAGNESIUM SERPL-MCNC: 2.3 MG/DL (ref 1.7–2.3)
MCH RBC QN AUTO: 29.9 PG (ref 26.5–33)
MCHC RBC AUTO-ENTMCNC: 31.5 G/DL (ref 31.5–36.5)
MCV RBC AUTO: 95 FL (ref 78–100)
PLATELET # BLD AUTO: 309 10E3/UL (ref 150–450)
POTASSIUM SERPL-SCNC: 3.4 MMOL/L (ref 3.4–5.3)
RBC # BLD AUTO: 2.44 10E6/UL (ref 4.4–5.9)
WBC # BLD AUTO: 12.4 10E3/UL (ref 4–11)

## 2023-07-08 PROCEDURE — 250N000013 HC RX MED GY IP 250 OP 250 PS 637: Performed by: INTERNAL MEDICINE

## 2023-07-08 PROCEDURE — 250N000012 HC RX MED GY IP 250 OP 636 PS 637: Performed by: NURSE PRACTITIONER

## 2023-07-08 PROCEDURE — 120N000009 HC R&B SNF

## 2023-07-08 PROCEDURE — 250N000012 HC RX MED GY IP 250 OP 636 PS 637: Performed by: INTERNAL MEDICINE

## 2023-07-08 PROCEDURE — 85610 PROTHROMBIN TIME: CPT | Performed by: INTERNAL MEDICINE

## 2023-07-08 PROCEDURE — 99310 SBSQ NF CARE HIGH MDM 45: CPT | Performed by: INTERNAL MEDICINE

## 2023-07-08 PROCEDURE — 97535 SELF CARE MNGMENT TRAINING: CPT | Mod: GO

## 2023-07-08 PROCEDURE — 84132 ASSAY OF SERUM POTASSIUM: CPT | Performed by: INTERNAL MEDICINE

## 2023-07-08 PROCEDURE — 85027 COMPLETE CBC AUTOMATED: CPT | Performed by: INTERNAL MEDICINE

## 2023-07-08 PROCEDURE — 83735 ASSAY OF MAGNESIUM: CPT | Performed by: INTERNAL MEDICINE

## 2023-07-08 PROCEDURE — 36415 COLL VENOUS BLD VENIPUNCTURE: CPT | Performed by: INTERNAL MEDICINE

## 2023-07-08 PROCEDURE — 97110 THERAPEUTIC EXERCISES: CPT | Mod: GO

## 2023-07-08 RX ORDER — PREDNISONE 20 MG/1
20 TABLET ORAL DAILY
Status: DISCONTINUED | OUTPATIENT
Start: 2023-07-09 | End: 2023-07-08

## 2023-07-08 RX ORDER — BENZOCAINE/MENTHOL 6 MG-10 MG
LOZENGE MUCOUS MEMBRANE 2 TIMES DAILY
Status: DISCONTINUED | OUTPATIENT
Start: 2023-07-08 | End: 2023-07-27 | Stop reason: HOSPADM

## 2023-07-08 RX ORDER — PREDNISONE 20 MG/1
20 TABLET ORAL DAILY
Status: COMPLETED | OUTPATIENT
Start: 2023-07-08 | End: 2023-07-09

## 2023-07-08 RX ORDER — PREDNISONE 5 MG/1
5 TABLET ORAL DAILY
Status: DISCONTINUED | OUTPATIENT
Start: 2023-07-08 | End: 2023-07-08

## 2023-07-08 RX ORDER — PREDNISONE 5 MG/1
5 TABLET ORAL DAILY
Status: DISCONTINUED | OUTPATIENT
Start: 2023-07-10 | End: 2023-07-11

## 2023-07-08 RX ADMIN — B-COMPLEX W/ C & FOLIC ACID TAB 1 MG 1 TABLET: 1 TAB at 11:30

## 2023-07-08 RX ADMIN — VALGANCICLOVIR HYDROCHLORIDE 450 MG: 450 TABLET ORAL at 20:30

## 2023-07-08 RX ADMIN — INSULIN GLARGINE 15 UNITS: 100 INJECTION, SOLUTION SUBCUTANEOUS at 22:07

## 2023-07-08 RX ADMIN — POLYETHYLENE GLYCOL 3350 17 G: 17 POWDER, FOR SOLUTION ORAL at 09:28

## 2023-07-08 RX ADMIN — Medication 1.5 MG: at 18:55

## 2023-07-08 RX ADMIN — PREGABALIN 25 MG: 25 CAPSULE ORAL at 20:30

## 2023-07-08 RX ADMIN — INSULIN ASPART 8 UNITS: 100 INJECTION, SOLUTION INTRAVENOUS; SUBCUTANEOUS at 18:58

## 2023-07-08 RX ADMIN — MYCOPHENOLATE MOFETIL 750 MG: 250 CAPSULE ORAL at 09:07

## 2023-07-08 RX ADMIN — BUMETANIDE 2 MG: 1 TABLET ORAL at 15:53

## 2023-07-08 RX ADMIN — BUMETANIDE 2 MG: 1 TABLET ORAL at 09:09

## 2023-07-08 RX ADMIN — PREGABALIN 25 MG: 25 CAPSULE ORAL at 09:05

## 2023-07-08 RX ADMIN — NYSTATIN 500000 UNITS: 500000 SUSPENSION ORAL at 18:55

## 2023-07-08 RX ADMIN — INSULIN ASPART 7 UNITS: 100 INJECTION, SOLUTION INTRAVENOUS; SUBCUTANEOUS at 09:14

## 2023-07-08 RX ADMIN — PREDNISONE 20 MG: 20 TABLET ORAL at 11:30

## 2023-07-08 RX ADMIN — MAGNESIUM OXIDE TAB 400 MG (241.3 MG ELEMENTAL MG) 400 MG: 400 (241.3 MG) TAB at 20:30

## 2023-07-08 RX ADMIN — ALLOPURINOL 100 MG: 100 TABLET ORAL at 09:07

## 2023-07-08 RX ADMIN — Medication 12.5 MG: at 22:06

## 2023-07-08 RX ADMIN — ATORVASTATIN CALCIUM 20 MG: 10 TABLET, FILM COATED ORAL at 20:30

## 2023-07-08 RX ADMIN — URSODIOL 250 MG: 250 TABLET ORAL at 20:30

## 2023-07-08 RX ADMIN — PREGABALIN 25 MG: 25 CAPSULE ORAL at 13:50

## 2023-07-08 RX ADMIN — ASPIRIN 325 MG: 325 TABLET, FILM COATED ORAL at 09:07

## 2023-07-08 RX ADMIN — INSULIN ASPART 2 UNITS: 100 INJECTION, SOLUTION INTRAVENOUS; SUBCUTANEOUS at 22:07

## 2023-07-08 RX ADMIN — SULFAMETHOXAZOLE AND TRIMETHOPRIM 1 TABLET: 400; 80 TABLET ORAL at 09:07

## 2023-07-08 RX ADMIN — INSULIN ASPART: 100 INJECTION, SOLUTION INTRAVENOUS; SUBCUTANEOUS at 09:10

## 2023-07-08 RX ADMIN — PANTOPRAZOLE SODIUM 40 MG: 40 TABLET, DELAYED RELEASE ORAL at 05:56

## 2023-07-08 RX ADMIN — INSULIN ASPART 7 UNITS: 100 INJECTION, SOLUTION INTRAVENOUS; SUBCUTANEOUS at 13:49

## 2023-07-08 RX ADMIN — MYCOPHENOLATE MOFETIL 750 MG: 250 CAPSULE ORAL at 18:55

## 2023-07-08 RX ADMIN — TACROLIMUS 1.5 MG: 1 CAPSULE ORAL at 09:05

## 2023-07-08 RX ADMIN — URSODIOL 250 MG: 250 TABLET ORAL at 09:07

## 2023-07-08 RX ADMIN — DICLOFENAC SODIUM 4 G: 10 GEL TOPICAL at 09:08

## 2023-07-08 RX ADMIN — NYSTATIN 500000 UNITS: 500000 SUSPENSION ORAL at 13:50

## 2023-07-08 RX ADMIN — TACROLIMUS 1.5 MG: 1 CAPSULE ORAL at 18:55

## 2023-07-08 RX ADMIN — INSULIN ASPART 5 UNITS: 100 INJECTION, SOLUTION INTRAVENOUS; SUBCUTANEOUS at 18:59

## 2023-07-08 RX ADMIN — NYSTATIN 500000 UNITS: 500000 SUSPENSION ORAL at 09:07

## 2023-07-08 RX ADMIN — NYSTATIN 500000 UNITS: 500000 SUSPENSION ORAL at 20:30

## 2023-07-08 RX ADMIN — MAGNESIUM OXIDE TAB 400 MG (241.3 MG ELEMENTAL MG) 400 MG: 400 (241.3 MG) TAB at 11:30

## 2023-07-08 RX ADMIN — SIMETHICONE 80 MG: 80 TABLET, CHEWABLE ORAL at 15:52

## 2023-07-08 RX ADMIN — INSULIN ASPART 1 UNITS: 100 INJECTION, SOLUTION INTRAVENOUS; SUBCUTANEOUS at 13:49

## 2023-07-08 ASSESSMENT — ACTIVITIES OF DAILY LIVING (ADL)
ADLS_ACUITY_SCORE: 36

## 2023-07-08 NOTE — PROGRESS NOTES
[facility]itional Care    Medicine Progress Note - Hospitalist Service    Date of Admission:  2023    Assessment & Plan     Damion Quinones is a 67 year old male admitted on 2023 for ongoing rehabilitation after hospitalization at [facility].    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   He underwent simultaneous liver kidney transplant on 2023. RTOR on  with concern for low flow in the renal graft - ex-lap, washout, closure with healthy appearing graft with intact arterial and venous flow. He is hospital course was also complicated by renal failure requiring CRRT and intermittent hemodialysis, RIJ clot, atrial fibrillation, hypertension, fluid overload, hypoxemia, anemia, leukocytosis, malnutrition, steroids/tube feed induced hyperglycemia, gout and deconditioning.  For details of hospitalization please refer to the discharge summary note on 2023.    Patient was admitted to TCU to complete rehabilitation program. Expected discharge on .     Today's changes: 2023    Gout flare is improving, patient would like to continue on same dose of prednisone for more days. I discussed the case with Transplant Fellow on call whom agreed to continue Prednisone 20 mg daily over the weekend. Patient needs to return to Prednisone 5 mg daily on Monday.     No other issues. Generalized weakness and peripheral edema is improving.       # Physical deconditioning:   - Continue working with PT and OT.      # S/p DBD simultaneous liver kidney transplant on 2023 with complex reconstruction of accessory right hepatic artery:  # H/o CKD related IgA nephropathy and ANCA vasculitis s/p  donor renal transplant on 2023 with ureteral/J stent placement, delayed graft function:     - Transplant  team following the patient.   - Transplant team recs:    POD # 32     Liver transplant w/ complex reconstruction of accessory right hepatic artery: No biliary stent. US liver on 7/5 due to increased transaminases. No acute findings on US, normal doppler. AST/ALT/Alk phos decreased today.     Kidney transplant w/ stent, delayed graft function:   Cr 1.8, stable. 6/20 biopsy: ATN, no rejection.     Immunosuppression:  Induction: Steroid taper and Thymoglobulin 400 mg (4 mg/kg).   Maintenance:   -  mg BID  - Tacrolimus goal level 5-6 per Dr. Clifton on 7/3. Level in process. Will review and document if change made to dose.   - Prednisone 5mg daily per Dr. Clifton (reduced dose on 7/3).   - Discussed with Transplant team today, continue Prednisone 20 mg x 2 days. Return to Prednisone 5 mg 7/10.     Infection prophylaxis: PJP (bactrim), viral (Valcyte x 12 weeks)     Transplant coordinator: Cindy Clay (Liver), Angelita Torres (Kidney) [phone]  Donor type: DBD  DSA at time of transplant:  Yes CW9 6/5/23  Ureteral stent: Yes  Biliary stent: No       # Acute on chronic anemia:    - Hgb stable 7-8 without any s/sx of bleeding.  - Last transfused 6/17.  - Hemoglobin level 7.3 7/8/2023     Plan:  - CBC every Monday Wednesday Friday.  - Transfuse if hemoglobin less than 7 g/dL.    # Leucocytosis   -- Most likely steroid related. No fever. Trending down.     # LE edema   -- Continue on Bumex BID. LE edema improving.      # RIJ clot:   * US 6/15 with partially occlusive inferior R internal jugular thrombus (previously partially occlusive thrombus in right axillary resolved).   * Patient is currently on heparin infusion at low intensity and warfarin.  * INR goal is 1.5-2.   Plan:  - Case discussed with pharmacy. INR trending down. Coumadin per pharmacy.      # Cardiology:  # Coronary artery disease, paroxysmal atrial fibrillation  * Patient was seen by cardiology during inpatient hospital stay.  * Patient is  currently on aspirin, atorvastatin, metoprolol and warfarin.  Due to risks of bleeding and INR goal is lowered at 1.5-2     Plan:  - Continue aspirin, metoprolol, atorvastatin  - Continue warfarin.  Pharmacy to dose.     # Severe malnutrition in the context of acute illness:  - PO intake is improving. Nutrition following the patient.      # Steroid/TF induced hyperglycemia:  * HgA1c 6.1% (6/6/2023).  Plan:  - Mild hyperglycemia as below. Increase lantus to 15 U. Continue monitoring BG.      Recent Labs   Lab 07/08/23  0719 07/07/23  2138 07/07/23  1741 07/07/23  1209 07/07/23  1010 07/07/23  0742   * 292* 260* 211* 218* 119*        ## Rheumatology:  # Psoriatic arthritis: Prior to admission was on prednisone 10 mg daily.  # Gout: Having an active gout flare. Improving.      Plan:  - Continue on Prednisone as above.   -Continue allopurinol.      # Hepatitis B s Ag +: Noted to be positive pre-transplant on 6/5/23, but not previously positive (1/26/2023). HBV DNA negative on 6/5/23. No clear at risk behavior. Repeat Hep B s Ag was negative and HBV DNA not detected; suspect 6/5/2023 HBsAg positivity was a false positive.      Pneumococcal vaccination: Patient recently had solid organ transplant.  Will defer vaccination to primary transplant team        Diet: Regular Diet Adult  Snacks/Supplements Adult: Other; Please allow pt/RN to order snacks/supplements PRN; Between Meals    DVT Prophylaxis: Warfarin  Lux Catheter: Not present  Lines:      Cardiac Monitoring: None  Code Status: Full Code      Clinically Significant Risk Factors        # Hypokalemia: Lowest K = 3 mmol/L in last 2 days, will replace as needed  # Hyponatremia: Lowest Na = 129 mmol/L in last 2 days, will monitor as appropriate   # Hypercalcemia: corrected calcium is >10.1, will monitor as appropriate    # Hypoalbuminemia: Lowest albumin = 2.6 g/dL at 7/7/2023 10:10 AM, will monitor as appropriate     # Hypertension: Noted on problem list          # Moderate Malnutrition: based on nutrition assessment         Disposition Plan     Expected Discharge Date: 07/18/2023                  Marvin Weiner MD  Hospitalist Service  [facility] [place]  Securely message with Ernie (more info)  Text page via [employer] Paging/Directory   ______________________________________________________________________    Interval History     Acute events as above.   No chest pain, palpitations, shortness of breath, fever, chills or diaphoresis.       Physical Exam   Vital Signs: Temp: 97.8  F (36.6  C) Temp src: Oral BP: 117/77 Pulse: 93   Resp: 18 SpO2: 98 % O2 Device: None (Room air)    Weight: 212 lbs 11.9 oz    General Appearance: Alert, interactive, NAD  Respiratory: Normal work of breathing. RA  Cardiovascular: RRR  GI/Abd: Soft. Non tender.   Extremities: Distally wwp. BLE edema +.   + tenderness to palpation on both hands (improving). Erythema resolved.   Neuro: AO x 4, Grossly non focal.   Psychiatry: Stable mood.      Medical Decision Making       45 MINUTES SPENT BY ME on the date of service doing chart review, history, exam, documentation & further activities per the note.      Data     I have personally reviewed the following data over the past 24 hrs:    12.4 (H)  \   7.3 (L)   / 309     129 (L) 90 (L) 67.9 (H) /  127 (H)   3.4 22 1.78 (H) \       ALT: 68 AST: 45 AP: 352 (H) TBILI: 0.6   ALB: 2.6 (L) TOT PROTEIN: 5.9 (L) LIPASE: N/A       INR:  1.75 (H) PTT:  N/A   D-dimer:  N/A Fibrinogen:  N/A       Imaging results reviewed over the past 24 hrs:   No results found for this or any previous visit (from the past 24 hour(s)).

## 2023-07-08 NOTE — PROVIDER NOTIFICATION
Paged provider for  One time order for Miralax . Pt is c/o of constipation.  Received new order for one time miralax . First give PRN  Bisacodyl 10 mg suppository rectal.

## 2023-07-08 NOTE — PLAN OF CARE
Recheck potassium lab results is 4.2.On  RN managed potassium and magnesium. Pt denied chest pain, SOB, chest pain, N/V.  Able to make needs known. Pt denied pain or discomfort. Pt uses urinal at bed side. continent of  B&B. Pt uses bed side commode for BM. Pt c/o urgency , unable to have BM. Offered prune juice. Paged on call provider for requesting PRN miralax once. Pt refused to take  Bisacodyl suppository and miralax. Per pt wife he had BM. BG 's are 260, and 292. Sliding scale and carb coverage insulin given. PIV on R fore arm patent,dressing LIDYA and saline flushed. Call light with in reach continue with POC.     Patient's most recent vital signs are:     Vital signs:  BP: 109/68  Temp: 97  HR: 97  RR: 20  SpO2: 98 %     Patient does not have new respiratory symptoms.  Patient does not have new sore throat.  Patient does not have a fever greater than 99.5.

## 2023-07-08 NOTE — PLAN OF CARE
Goal Outcome Evaluation:  Pt.A&Ox4. Uses call light appropriately. Continent using urinal indep.- staff emptied. LBM yesterday. On standard Mg/K replacement - labs ordered this AM per protocol. Has no c/o's pain,discomfort, CP/SOB. Sleeps well between cares.    0610 - Pt.requested and transferred to [facility] with assist of 2 using liko lift.        Patient's most recent vital signs are:     Vital signs:  BP: 115/78  Temp: 97  HR: 92  RR: 20  SpO2: 98 %     Patient does not have new respiratory symptoms.  Patient does not have new sore throat.  Patient does not have a fever greater than 99.5.

## 2023-07-09 ENCOUNTER — APPOINTMENT (OUTPATIENT)
Dept: PHYSICAL THERAPY | Facility: SKILLED NURSING FACILITY | Age: 68
End: 2023-07-09
Attending: INTERNAL MEDICINE
Payer: COMMERCIAL

## 2023-07-09 LAB
GLUCOSE BLDC GLUCOMTR-MCNC: 133 MG/DL (ref 70–99)
GLUCOSE BLDC GLUCOMTR-MCNC: 195 MG/DL (ref 70–99)
GLUCOSE BLDC GLUCOMTR-MCNC: 214 MG/DL (ref 70–99)
GLUCOSE BLDC GLUCOMTR-MCNC: 238 MG/DL (ref 70–99)
INR PPP: 1.57 (ref 0.85–1.15)
MAGNESIUM SERPL-MCNC: 1.8 MG/DL (ref 1.7–2.3)
POTASSIUM SERPL-SCNC: 3 MMOL/L (ref 3.4–5.3)

## 2023-07-09 PROCEDURE — 83735 ASSAY OF MAGNESIUM: CPT | Performed by: INTERNAL MEDICINE

## 2023-07-09 PROCEDURE — 120N000009 HC R&B SNF

## 2023-07-09 PROCEDURE — 84132 ASSAY OF SERUM POTASSIUM: CPT | Performed by: INTERNAL MEDICINE

## 2023-07-09 PROCEDURE — 97110 THERAPEUTIC EXERCISES: CPT | Mod: GP | Performed by: PHYSICAL THERAPIST

## 2023-07-09 PROCEDURE — 36415 COLL VENOUS BLD VENIPUNCTURE: CPT | Performed by: INTERNAL MEDICINE

## 2023-07-09 PROCEDURE — 250N000012 HC RX MED GY IP 250 OP 636 PS 637: Performed by: INTERNAL MEDICINE

## 2023-07-09 PROCEDURE — 250N000013 HC RX MED GY IP 250 OP 250 PS 637: Performed by: INTERNAL MEDICINE

## 2023-07-09 PROCEDURE — 250N000012 HC RX MED GY IP 250 OP 636 PS 637: Performed by: NURSE PRACTITIONER

## 2023-07-09 PROCEDURE — 97530 THERAPEUTIC ACTIVITIES: CPT | Mod: GP | Performed by: PHYSICAL THERAPIST

## 2023-07-09 PROCEDURE — 85610 PROTHROMBIN TIME: CPT | Performed by: INTERNAL MEDICINE

## 2023-07-09 RX ORDER — POTASSIUM CHLORIDE 750 MG/1
40 TABLET, EXTENDED RELEASE ORAL ONCE
Status: COMPLETED | OUTPATIENT
Start: 2023-07-09 | End: 2023-07-09

## 2023-07-09 RX ORDER — POTASSIUM CHLORIDE 750 MG/1
20 TABLET, EXTENDED RELEASE ORAL ONCE
Status: COMPLETED | OUTPATIENT
Start: 2023-07-09 | End: 2023-07-09

## 2023-07-09 RX ADMIN — INSULIN GLARGINE 15 UNITS: 100 INJECTION, SOLUTION SUBCUTANEOUS at 22:23

## 2023-07-09 RX ADMIN — SULFAMETHOXAZOLE AND TRIMETHOPRIM 1 TABLET: 400; 80 TABLET ORAL at 08:06

## 2023-07-09 RX ADMIN — PANTOPRAZOLE SODIUM 40 MG: 40 TABLET, DELAYED RELEASE ORAL at 05:49

## 2023-07-09 RX ADMIN — MYCOPHENOLATE MOFETIL 750 MG: 250 CAPSULE ORAL at 08:05

## 2023-07-09 RX ADMIN — POLYETHYLENE GLYCOL 3350 17 G: 17 POWDER, FOR SOLUTION ORAL at 08:06

## 2023-07-09 RX ADMIN — ASPIRIN 325 MG: 325 TABLET, FILM COATED ORAL at 08:05

## 2023-07-09 RX ADMIN — PREGABALIN 25 MG: 25 CAPSULE ORAL at 08:05

## 2023-07-09 RX ADMIN — POTASSIUM CHLORIDE 40 MEQ: 750 TABLET, EXTENDED RELEASE ORAL at 17:00

## 2023-07-09 RX ADMIN — Medication 1.5 MG: at 18:19

## 2023-07-09 RX ADMIN — MYCOPHENOLATE MOFETIL 750 MG: 250 CAPSULE ORAL at 18:19

## 2023-07-09 RX ADMIN — INSULIN ASPART 6 UNITS: 100 INJECTION, SOLUTION INTRAVENOUS; SUBCUTANEOUS at 13:14

## 2023-07-09 RX ADMIN — NYSTATIN 500000 UNITS: 500000 SUSPENSION ORAL at 13:08

## 2023-07-09 RX ADMIN — ATORVASTATIN CALCIUM 20 MG: 10 TABLET, FILM COATED ORAL at 22:23

## 2023-07-09 RX ADMIN — Medication 12.5 MG: at 22:23

## 2023-07-09 RX ADMIN — TACROLIMUS 1.5 MG: 1 CAPSULE ORAL at 18:19

## 2023-07-09 RX ADMIN — VALGANCICLOVIR HYDROCHLORIDE 450 MG: 450 TABLET ORAL at 19:22

## 2023-07-09 RX ADMIN — MAGNESIUM OXIDE TAB 400 MG (241.3 MG ELEMENTAL MG) 400 MG: 400 (241.3 MG) TAB at 22:23

## 2023-07-09 RX ADMIN — PREDNISONE 20 MG: 20 TABLET ORAL at 08:06

## 2023-07-09 RX ADMIN — INSULIN ASPART 3 UNITS: 100 INJECTION, SOLUTION INTRAVENOUS; SUBCUTANEOUS at 13:08

## 2023-07-09 RX ADMIN — DICLOFENAC SODIUM 4 G: 10 GEL TOPICAL at 13:09

## 2023-07-09 RX ADMIN — INSULIN ASPART 13 UNITS: 100 INJECTION, SOLUTION INTRAVENOUS; SUBCUTANEOUS at 08:15

## 2023-07-09 RX ADMIN — NYSTATIN 500000 UNITS: 500000 SUSPENSION ORAL at 08:05

## 2023-07-09 RX ADMIN — URSODIOL 250 MG: 250 TABLET ORAL at 08:05

## 2023-07-09 RX ADMIN — INSULIN ASPART 4 UNITS: 100 INJECTION, SOLUTION INTRAVENOUS; SUBCUTANEOUS at 18:24

## 2023-07-09 RX ADMIN — ALLOPURINOL 100 MG: 100 TABLET ORAL at 08:05

## 2023-07-09 RX ADMIN — B-COMPLEX W/ C & FOLIC ACID TAB 1 MG 1 TABLET: 1 TAB at 13:08

## 2023-07-09 RX ADMIN — NYSTATIN 500000 UNITS: 500000 SUSPENSION ORAL at 16:34

## 2023-07-09 RX ADMIN — SIMETHICONE 80 MG: 80 TABLET, CHEWABLE ORAL at 13:07

## 2023-07-09 RX ADMIN — URSODIOL 250 MG: 250 TABLET ORAL at 22:23

## 2023-07-09 RX ADMIN — PREGABALIN 25 MG: 25 CAPSULE ORAL at 19:22

## 2023-07-09 RX ADMIN — POTASSIUM CHLORIDE 20 MEQ: 750 TABLET, EXTENDED RELEASE ORAL at 19:22

## 2023-07-09 RX ADMIN — BUMETANIDE 2 MG: 1 TABLET ORAL at 16:34

## 2023-07-09 RX ADMIN — MAGNESIUM OXIDE TAB 400 MG (241.3 MG ELEMENTAL MG) 400 MG: 400 (241.3 MG) TAB at 13:08

## 2023-07-09 RX ADMIN — PREGABALIN 25 MG: 25 CAPSULE ORAL at 13:08

## 2023-07-09 RX ADMIN — TACROLIMUS 1.5 MG: 1 CAPSULE ORAL at 08:05

## 2023-07-09 RX ADMIN — HYDROCORTISONE: 1 CREAM TOPICAL at 08:14

## 2023-07-09 RX ADMIN — NYSTATIN 500000 UNITS: 500000 SUSPENSION ORAL at 22:23

## 2023-07-09 ASSESSMENT — ACTIVITIES OF DAILY LIVING (ADL)
ADLS_ACUITY_SCORE: 36

## 2023-07-09 NOTE — PLAN OF CARE
Goal Outcome Evaluation:  Patient is on his recliner BLE is elevated during the shift. On blood sugar check with carb counting. Patient wanted to give all medications after dinner even educated with the importance of transplant medication to be on time.Uses urinal at bedside. Liko lift during transfer.Both hand knuckles still swollen but felt much better per patient. No bleeding noted. Bruised is scattered all over body. PIV on L lower arm is intact.Can make needs known. Call light with in reach. Continue with current plan of care.    Blood pressure 113/70, pulse 108, temperature 98.2  F (36.8  C), temperature source Oral, resp. rate 18, weight 96.3 kg (212 lb 4.9 oz), SpO2 99 %.

## 2023-07-09 NOTE — PLAN OF CARE
Goal Outcome Evaluation:  Pt.A&Ox4. Sleeping in recliner tonight. Continent using urinal indep.- staff empties. Pt.requested for BSC - usually liko lift but tonight pt.able to SPT w/assist of 2 / gaitbelt. Continent lrg.soft>formed BM. Able to stand for a few minutes with assist of 2 while writer provided allison cares. Per pt.request, applied tucks pad and criticaid paste. Pt.pleased with his accomplishment w/writer affirming. Has no c/o's pain,discomfort, CP/SOB.         Patient's most recent vital signs are:     Vital signs:  BP: 113/70  Temp: 98.2  HR: 108  RR: 18  SpO2: 99 %     Patient does not have new respiratory symptoms.  Patient does not have new sore throat.  Patient does not have a fever greater than 99.5.

## 2023-07-10 ENCOUNTER — MYC REFILL (OUTPATIENT)
Dept: TRANSPLANT | Facility: CLINIC | Age: 68
End: 2023-07-10
Payer: COMMERCIAL

## 2023-07-10 ENCOUNTER — APPOINTMENT (OUTPATIENT)
Dept: OCCUPATIONAL THERAPY | Facility: SKILLED NURSING FACILITY | Age: 68
End: 2023-07-10
Attending: INTERNAL MEDICINE
Payer: COMMERCIAL

## 2023-07-10 ENCOUNTER — OFFICE VISIT (OUTPATIENT)
Dept: TRANSPLANT | Facility: CLINIC | Age: 68
End: 2023-07-10
Attending: TRANSPLANT SURGERY
Payer: COMMERCIAL

## 2023-07-10 ENCOUNTER — APPOINTMENT (OUTPATIENT)
Dept: PHYSICAL THERAPY | Facility: SKILLED NURSING FACILITY | Age: 68
End: 2023-07-10
Attending: INTERNAL MEDICINE
Payer: COMMERCIAL

## 2023-07-10 VITALS
SYSTOLIC BLOOD PRESSURE: 121 MMHG | WEIGHT: 213.3 LBS | OXYGEN SATURATION: 100 % | DIASTOLIC BLOOD PRESSURE: 81 MMHG | HEART RATE: 109 BPM | BODY MASS INDEX: 33.41 KG/M2

## 2023-07-10 DIAGNOSIS — M10.9 ACUTE GOUTY ARTHRITIS: ICD-10-CM

## 2023-07-10 DIAGNOSIS — Z94.4 LIVER REPLACED BY TRANSPLANT (H): Primary | ICD-10-CM

## 2023-07-10 DIAGNOSIS — Z94.0 KIDNEY REPLACED BY TRANSPLANT: ICD-10-CM

## 2023-07-10 LAB
ALBUMIN SERPL BCG-MCNC: 2.9 G/DL (ref 3.5–5.2)
ALP SERPL-CCNC: 433 U/L (ref 40–129)
ALT SERPL W P-5'-P-CCNC: 106 U/L (ref 0–70)
ANION GAP SERPL CALCULATED.3IONS-SCNC: 14 MMOL/L (ref 7–15)
AST SERPL W P-5'-P-CCNC: 55 U/L (ref 0–45)
BASOPHILS # BLD MANUAL: 0.1 10E3/UL (ref 0–0.2)
BASOPHILS NFR BLD MANUAL: 1 %
BILIRUB SERPL-MCNC: 0.5 MG/DL
BUN SERPL-MCNC: 54.2 MG/DL (ref 8–23)
CALCIUM SERPL-MCNC: 9.3 MG/DL (ref 8.8–10.2)
CHLORIDE SERPL-SCNC: 95 MMOL/L (ref 98–107)
CREAT SERPL-MCNC: 1.41 MG/DL (ref 0.67–1.17)
DEPRECATED HCO3 PLAS-SCNC: 27 MMOL/L (ref 22–29)
EOSINOPHIL # BLD MANUAL: 0 10E3/UL (ref 0–0.7)
EOSINOPHIL NFR BLD MANUAL: 0 %
ERYTHROCYTE [DISTWIDTH] IN BLOOD BY AUTOMATED COUNT: 18.3 % (ref 10–15)
GFR SERPL CREATININE-BSD FRML MDRD: 55 ML/MIN/1.73M2
GLUCOSE BLDC GLUCOMTR-MCNC: 108 MG/DL (ref 70–99)
GLUCOSE BLDC GLUCOMTR-MCNC: 165 MG/DL (ref 70–99)
GLUCOSE BLDC GLUCOMTR-MCNC: 185 MG/DL (ref 70–99)
GLUCOSE BLDC GLUCOMTR-MCNC: 198 MG/DL (ref 70–99)
GLUCOSE BLDC GLUCOMTR-MCNC: 207 MG/DL (ref 70–99)
GLUCOSE SERPL-MCNC: 105 MG/DL (ref 70–99)
HCT VFR BLD AUTO: 22.6 % (ref 40–53)
HGB BLD-MCNC: 7 G/DL (ref 13.3–17.7)
INR PPP: 1.69 (ref 0.85–1.15)
LYMPHOCYTES # BLD MANUAL: 0.5 10E3/UL (ref 0.8–5.3)
LYMPHOCYTES NFR BLD MANUAL: 4 %
MAGNESIUM SERPL-MCNC: 1.6 MG/DL (ref 1.7–2.3)
MCH RBC QN AUTO: 29.8 PG (ref 26.5–33)
MCHC RBC AUTO-ENTMCNC: 31 G/DL (ref 31.5–36.5)
MCV RBC AUTO: 96 FL (ref 78–100)
METAMYELOCYTES # BLD MANUAL: 0.3 10E3/UL
METAMYELOCYTES NFR BLD MANUAL: 3 %
MONOCYTES # BLD MANUAL: 0.5 10E3/UL (ref 0–1.3)
MONOCYTES NFR BLD MANUAL: 4 %
MYELOCYTES # BLD MANUAL: 0.5 10E3/UL
MYELOCYTES NFR BLD MANUAL: 4 %
NEUTROPHILS # BLD MANUAL: 9.5 10E3/UL (ref 1.6–8.3)
NEUTROPHILS NFR BLD MANUAL: 82 %
PHOSPHATE SERPL-MCNC: 2.2 MG/DL (ref 2.5–4.5)
PLAT MORPH BLD: ABNORMAL
PLATELET # BLD AUTO: 237 10E3/UL (ref 150–450)
POLYCHROMASIA BLD QL SMEAR: SLIGHT
POTASSIUM SERPL-SCNC: 3.4 MMOL/L (ref 3.4–5.3)
POTASSIUM SERPL-SCNC: 3.6 MMOL/L (ref 3.4–5.3)
POTASSIUM SERPL-SCNC: 3.8 MMOL/L (ref 3.4–5.3)
PROMYELOCYTES # BLD MANUAL: 0.2 10E3/UL
PROMYELOCYTES NFR BLD MANUAL: 2 %
PROT SERPL-MCNC: 5.8 G/DL (ref 6.4–8.3)
RBC # BLD AUTO: 2.35 10E6/UL (ref 4.4–5.9)
RBC MORPH BLD: ABNORMAL
SODIUM SERPL-SCNC: 136 MMOL/L (ref 136–145)
TACROLIMUS BLD-MCNC: 3.7 UG/L (ref 5–15)
TME LAST DOSE: ABNORMAL H
TME LAST DOSE: ABNORMAL H
WBC # BLD AUTO: 11.6 10E3/UL (ref 4–11)

## 2023-07-10 PROCEDURE — 250N000012 HC RX MED GY IP 250 OP 636 PS 637: Performed by: INTERNAL MEDICINE

## 2023-07-10 PROCEDURE — 97535 SELF CARE MNGMENT TRAINING: CPT | Mod: GO

## 2023-07-10 PROCEDURE — 85610 PROTHROMBIN TIME: CPT | Performed by: INTERNAL MEDICINE

## 2023-07-10 PROCEDURE — 84100 ASSAY OF PHOSPHORUS: CPT | Performed by: INTERNAL MEDICINE

## 2023-07-10 PROCEDURE — 250N000013 HC RX MED GY IP 250 OP 250 PS 637: Performed by: INTERNAL MEDICINE

## 2023-07-10 PROCEDURE — 80197 ASSAY OF TACROLIMUS: CPT | Performed by: INTERNAL MEDICINE

## 2023-07-10 PROCEDURE — 97110 THERAPEUTIC EXERCISES: CPT | Mod: GP | Performed by: PHYSICAL THERAPIST

## 2023-07-10 PROCEDURE — 83735 ASSAY OF MAGNESIUM: CPT | Performed by: INTERNAL MEDICINE

## 2023-07-10 PROCEDURE — 250N000012 HC RX MED GY IP 250 OP 636 PS 637: Performed by: NURSE PRACTITIONER

## 2023-07-10 PROCEDURE — 99213 OFFICE O/P EST LOW 20 MIN: CPT | Performed by: SURGERY

## 2023-07-10 PROCEDURE — 36415 COLL VENOUS BLD VENIPUNCTURE: CPT | Performed by: INTERNAL MEDICINE

## 2023-07-10 PROCEDURE — 80053 COMPREHEN METABOLIC PANEL: CPT | Performed by: INTERNAL MEDICINE

## 2023-07-10 PROCEDURE — 120N000009 HC R&B SNF

## 2023-07-10 PROCEDURE — 80180 DRUG SCRN QUAN MYCOPHENOLATE: CPT | Performed by: SURGERY

## 2023-07-10 PROCEDURE — 97530 THERAPEUTIC ACTIVITIES: CPT | Mod: GP | Performed by: PHYSICAL THERAPIST

## 2023-07-10 PROCEDURE — 99213 OFFICE O/P EST LOW 20 MIN: CPT | Mod: 24 | Performed by: SURGERY

## 2023-07-10 PROCEDURE — 85007 BL SMEAR W/DIFF WBC COUNT: CPT | Performed by: INTERNAL MEDICINE

## 2023-07-10 PROCEDURE — 84132 ASSAY OF SERUM POTASSIUM: CPT | Performed by: INTERNAL MEDICINE

## 2023-07-10 PROCEDURE — 85027 COMPLETE CBC AUTOMATED: CPT | Performed by: INTERNAL MEDICINE

## 2023-07-10 RX ORDER — PREDNISONE 10 MG/1
10 TABLET ORAL DAILY
Qty: 30 TABLET | Refills: 0 | Status: SHIPPED | OUTPATIENT
Start: 2023-07-10 | End: 2023-07-25

## 2023-07-10 RX ORDER — PREDNISONE 10 MG/1
TABLET ORAL
Qty: 28 TABLET | Refills: 0 | Status: SHIPPED | OUTPATIENT
Start: 2023-07-10 | End: 2023-07-25

## 2023-07-10 RX ORDER — MYCOPHENOLIC ACID 360 MG/1
TABLET, DELAYED RELEASE ORAL 2 TIMES DAILY
Status: CANCELLED | OUTPATIENT
Start: 2023-07-10

## 2023-07-10 RX ORDER — PREDNISONE 10 MG/1
10 TABLET ORAL DAILY
Qty: 30 TABLET | Refills: 1
Start: 2023-07-21 | End: 2023-07-25

## 2023-07-10 RX ORDER — MYCOPHENOLIC ACID 360 MG/1
360 TABLET, DELAYED RELEASE ORAL 2 TIMES DAILY
Qty: 60 TABLET | Refills: 0 | Status: SHIPPED | OUTPATIENT
Start: 2023-07-10 | End: 2023-07-25

## 2023-07-10 RX ORDER — MYCOPHENOLIC ACID 180 MG/1
180 TABLET, DELAYED RELEASE ORAL 2 TIMES DAILY
Qty: 60 TABLET | Refills: 3
Start: 2023-07-10 | End: 2023-07-25

## 2023-07-10 RX ORDER — PREDNISONE 10 MG/1
TABLET ORAL
Qty: 28 TABLET | Refills: 0
Start: 2023-07-10 | End: 2023-07-25

## 2023-07-10 RX ORDER — PREDNISONE 20 MG/1
20 TABLET ORAL ONCE
Status: COMPLETED | OUTPATIENT
Start: 2023-07-10 | End: 2023-07-10

## 2023-07-10 RX ORDER — PREGABALIN 25 MG/1
25 CAPSULE ORAL DAILY
Qty: 30 CAPSULE | Refills: 0 | Status: SHIPPED | OUTPATIENT
Start: 2023-07-10 | End: 2023-07-25

## 2023-07-10 RX ORDER — MYCOPHENOLIC ACID 180 MG/1
540 TABLET, DELAYED RELEASE ORAL 2 TIMES DAILY
Qty: 84 TABLET | Refills: 0 | Status: SHIPPED | OUTPATIENT
Start: 2023-07-10 | End: 2023-07-25

## 2023-07-10 RX ORDER — TACROLIMUS 1 MG/1
2 CAPSULE ORAL 2 TIMES DAILY
Status: DISCONTINUED | OUTPATIENT
Start: 2023-07-10 | End: 2023-07-14

## 2023-07-10 RX ORDER — PREGABALIN 25 MG/1
25 CAPSULE ORAL ONCE
Qty: 30 CAPSULE | Refills: 0
Start: 2023-07-10 | End: 2023-07-25

## 2023-07-10 RX ORDER — POTASSIUM CHLORIDE 750 MG/1
40 TABLET, EXTENDED RELEASE ORAL ONCE
Status: COMPLETED | OUTPATIENT
Start: 2023-07-10 | End: 2023-07-10

## 2023-07-10 RX ORDER — MYCOPHENOLIC ACID 360 MG/1
360 TABLET, DELAYED RELEASE ORAL 2 TIMES DAILY
Qty: 60 TABLET | Refills: 3
Start: 2023-07-11 | End: 2023-07-25

## 2023-07-10 RX ORDER — PREGABALIN 25 MG/1
25 CAPSULE ORAL 2 TIMES DAILY
Status: CANCELLED | OUTPATIENT
Start: 2023-07-10

## 2023-07-10 RX ADMIN — TACROLIMUS 2 MG: 1 CAPSULE ORAL at 18:04

## 2023-07-10 RX ADMIN — B-COMPLEX W/ C & FOLIC ACID TAB 1 MG 1 TABLET: 1 TAB at 12:33

## 2023-07-10 RX ADMIN — INSULIN ASPART 5 UNITS: 100 INJECTION, SOLUTION INTRAVENOUS; SUBCUTANEOUS at 19:26

## 2023-07-10 RX ADMIN — NYSTATIN 500000 UNITS: 500000 SUSPENSION ORAL at 12:34

## 2023-07-10 RX ADMIN — MAGNESIUM OXIDE TAB 400 MG (241.3 MG ELEMENTAL MG) 400 MG: 400 (241.3 MG) TAB at 12:33

## 2023-07-10 RX ADMIN — MYCOPHENOLATE MOFETIL 750 MG: 250 CAPSULE ORAL at 08:47

## 2023-07-10 RX ADMIN — URSODIOL 250 MG: 250 TABLET ORAL at 08:46

## 2023-07-10 RX ADMIN — PANTOPRAZOLE SODIUM 40 MG: 40 TABLET, DELAYED RELEASE ORAL at 05:52

## 2023-07-10 RX ADMIN — SIMETHICONE 80 MG: 80 TABLET, CHEWABLE ORAL at 03:23

## 2023-07-10 RX ADMIN — ALLOPURINOL 100 MG: 100 TABLET ORAL at 08:47

## 2023-07-10 RX ADMIN — NYSTATIN 500000 UNITS: 500000 SUSPENSION ORAL at 08:49

## 2023-07-10 RX ADMIN — URSODIOL 250 MG: 250 TABLET ORAL at 21:58

## 2023-07-10 RX ADMIN — PREDNISONE 20 MG: 20 TABLET ORAL at 19:51

## 2023-07-10 RX ADMIN — TACROLIMUS 1.5 MG: 1 CAPSULE ORAL at 08:48

## 2023-07-10 RX ADMIN — HYDROCORTISONE: 1 CREAM TOPICAL at 22:05

## 2023-07-10 RX ADMIN — MYCOPHENOLATE MOFETIL 750 MG: 250 CAPSULE ORAL at 18:05

## 2023-07-10 RX ADMIN — DICLOFENAC SODIUM 4 G: 10 GEL TOPICAL at 19:54

## 2023-07-10 RX ADMIN — BUMETANIDE 2 MG: 1 TABLET ORAL at 16:13

## 2023-07-10 RX ADMIN — Medication 12.5 MG: at 05:52

## 2023-07-10 RX ADMIN — SULFAMETHOXAZOLE AND TRIMETHOPRIM 1 TABLET: 400; 80 TABLET ORAL at 08:46

## 2023-07-10 RX ADMIN — INSULIN GLARGINE 15 UNITS: 100 INJECTION, SOLUTION SUBCUTANEOUS at 22:03

## 2023-07-10 RX ADMIN — BUMETANIDE 2 MG: 1 TABLET ORAL at 08:45

## 2023-07-10 RX ADMIN — VALGANCICLOVIR HYDROCHLORIDE 450 MG: 450 TABLET ORAL at 19:51

## 2023-07-10 RX ADMIN — ATORVASTATIN CALCIUM 20 MG: 10 TABLET, FILM COATED ORAL at 21:58

## 2023-07-10 RX ADMIN — PREGABALIN 25 MG: 25 CAPSULE ORAL at 19:50

## 2023-07-10 RX ADMIN — ASPIRIN 325 MG: 325 TABLET, FILM COATED ORAL at 08:46

## 2023-07-10 RX ADMIN — Medication 12.5 MG: at 21:59

## 2023-07-10 RX ADMIN — SIMETHICONE 80 MG: 80 TABLET, CHEWABLE ORAL at 19:50

## 2023-07-10 RX ADMIN — PREDNISONE 5 MG: 5 TABLET ORAL at 08:47

## 2023-07-10 RX ADMIN — POLYETHYLENE GLYCOL 3350 17 G: 17 POWDER, FOR SOLUTION ORAL at 08:44

## 2023-07-10 RX ADMIN — PREGABALIN 25 MG: 25 CAPSULE ORAL at 08:46

## 2023-07-10 RX ADMIN — NYSTATIN 500000 UNITS: 500000 SUSPENSION ORAL at 16:13

## 2023-07-10 RX ADMIN — NYSTATIN 500000 UNITS: 500000 SUSPENSION ORAL at 21:58

## 2023-07-10 RX ADMIN — WARFARIN SODIUM 1.25 MG: 2.5 TABLET ORAL at 18:09

## 2023-07-10 RX ADMIN — MAGNESIUM OXIDE TAB 400 MG (241.3 MG ELEMENTAL MG) 400 MG: 400 (241.3 MG) TAB at 21:58

## 2023-07-10 RX ADMIN — INSULIN ASPART 3 UNITS: 100 INJECTION, SOLUTION INTRAVENOUS; SUBCUTANEOUS at 18:26

## 2023-07-10 RX ADMIN — POTASSIUM CHLORIDE 40 MEQ: 750 TABLET, EXTENDED RELEASE ORAL at 12:33

## 2023-07-10 ASSESSMENT — ACTIVITIES OF DAILY LIVING (ADL)
ADLS_ACUITY_SCORE: 36

## 2023-07-10 NOTE — LETTER
7/10/2023         RE: Damion Quinones   [address] [facility] [address]        Dear Colleague,    Thank you for referring your patient, Damion Quinones, to the [facility]. Please see a copy of my visit note below.    Transplant Surgery -OUTPATIENT IMMUNOSUPPRESSION PROGRESS NOTE    Date of Visit: 07/10/2023    Transplants:  6/6/2023 (Liver), 6/6/2023 (Kidney); Postoperative day:  34 (Liver), 34 (Kidney)  ASSESMENT AND PLAN:    1.Graft Function:   Liver allograft: no rejection or technical problems    Mild increase in ALT and AST, will continue to monitor.  Kidney allograft: no rejection or technical problems    Cr is improving  2.Immunosuppression Management: Tacro levels between 5-6, being switched to Myfortic 540mg at current visit due to complaints of abdominal discomfort. Can be transitioned to 360mg after 2 weeks if symptoms persist. Ordering MPA level for tomorrow AM. See note below for prednisone dosing.   3.Hypertension: Controlled, normotensive.   4.Renal Function: Cr 1.41 (down from 1.99) Improving  5.Lab frequency: twice weekly  6.Other:    1. Gout: Pt has had a flare of gout in the past 2 weeks which is hindering his ability to be active and participate in PT. Before the transplant, the patient had been taking 10 mg of prednisone, up to 40 mg with taper for flares. We will attempt to control this flare with a 30 mg dose down to 20 mg and then 10mg tapered over 4 days at each dose. Patient to continue on 10mg prednisone thereafter.    2. Neuropathic pain: modify lyrica to 25mg from BID to once daily in the evening   3. Continue nutrition and physical therapy recs   4. Follow up in 2 weeks with Dr. 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)    Esophageal varices in cirrhosis (H)    Essential hypertension    Family history of colon cancer    Family history of prostate cancer    GAVE (gastric antral vascular ectasia)    Hereditary hemochromatosis (H)    Other hyperlipidemia    Psoriasis    S/P TIPS (transjugular intrahepatic portosystemic shunt)    Tophaceous gout    Deep vein thrombosis (DVT) of non-extremity vein, unspecified chronicity    Moderate protein-calorie malnutrition (H)    Alcohol use, unspecified with unspecified alcohol-induced disorder (H)    Acute deep vein thrombosis (DVT) of right lower extremity, unspecified vein (H)    Encephalopathy    Pre-operative cardiovascular examination    CKD (chronic kidney disease) stage 5, GFR less than 15 ml/min (H)    Status post coronary angiogram    Recurrent Clostridioides difficile diarrhea    Chronic atrial fibrillation (H)    Physical deconditioning    Liver transplant recipient (H)    Bacteremia due to Enterococcus    Administration of long-term prophylactic antibiotics    Immunosuppressed status (H)    Ileus, postoperative (H)    Atrial fibrillation with rapid ventricular response (H)    Delayed graft function of kidney    Hypotension    Kidney transplant recipient    Acute post-operative pain    CAD (coronary artery disease)    Severe malnutrition (H)    Steroid-induced hyperglycemia    Muscular deconditioning     SOCIAL /FAMILY HISTORY: [x]                  No recent change    Past Medical History:   Diagnosis Date    Alcoholic cirrhosis of liver with ascites (H) 10/11/2019     ANCA-associated vasculitis (H)     C. difficile colitis     Coronary artery disease     [facility] 2023 - complete occlusion of RCA    ESRD (end stage renal disease) on dialysis (H)     Gout     History of hemochromatosis 10/11/2019    Hypertension     IgA nephropathy     Obesity     PAF (paroxysmal atrial fibrillation) (H)     Pre-diabetes     Psoriatic arthritis (H)     Psoriatic arthritis (H)     RA (rheumatoid arthritis) (H)     Status post kidney transplant 2023    Induction with thymoglobulin 4mg/kg, + DSA CW9    Status post liver transplantation (H) 2023     Past Surgical History:   Procedure Laterality Date    APPENDECTOMY      Removed at 18 Years Old     BENCH KIDNEY  2023    Procedure: [facility] kidney;  Surgeon: Chad Clifton MD;  Location:  OR    [facility] LIVER  2023    Procedure: [facility] liver;  Surgeon: Chad Clifton MD;  Location:  OR    COLONOSCOPY      2014 at [facility].     CV CORONARY ANGIOGRAM N/A 2023    Procedure: Coronary Angiogram;  Surgeon: Pablo Araujo MD;  Location:  HEART CARDIAC CATH LAB    EYE SURGERY Bilateral     Cataract    H STATISTIC PICC LINE INSERTION >5YR, FAILED Left 2023    Unable to advance catheter over the axillary area    HERNIA REPAIR      History of bilateral inguinal hernia repair: 10/28/2014. Open hernia repair: 10/2017. Abdominal wound exploration and debridement 2017    INSERT SHUNT PORTAL TRANSJUGULAR INTRAHEPTIC  2022    IR CVC NON TUNNEL PLACEMENT > 5 YRS  2023    IR CVC TUNNEL PLACEMENT > 5 YRS OF AGE  2023    IR PICC PLACEMENT > 5 YRS OF AGE  2023    IR RENAL BIOPSY RIGHT  2023    RETURN LIVER TRANSPLANT N/A 2023    Procedure: Return liver transplant. Intra-operative ultrasound;  Surgeon: Chad Clifton MD;  Location:  OR    TRANSPLANT KIDNEY RECIPIENT  DONOR N/A 2023    Procedure: Transplant kidney recipient  donor,  ureteral stent placement;  Surgeon: Chad Clifton MD;  Location: [facility] OR    TRANSPLANT LIVER RECIPIENT  DONOR N/A 2023    Procedure: Transplant liver recipient  donor;  Surgeon: Chad Clifton MD;  Location:  OR     Social History     Socioeconomic History    Marital status:      Spouse name: Not on file    Number of children: Not on file    Years of education: Not on file    Highest education level: Not on file   Occupational History    Not on file   Tobacco Use    Smoking status: Never     Passive exposure: Never    Smokeless tobacco: Never   Vaping Use    Vaping Use: Never used   Substance and Sexual Activity    Alcohol use: Not Currently     Comment: Last ETOH use was 2021    Drug use: Not Currently    Sexual activity: Not on file   Other Topics Concern    Parent/sibling w/ CABG, MI or angioplasty before 65F 55M? Not Asked   Social History Narrative    Not on file     Social Determinants of Health     Financial Resource Strain: Not on file   Food Insecurity: Not on file   Transportation Needs: Not on file   Physical Activity: Not on file   Stress: Not on file   Social Connections: Not on file   Intimate Partner Violence: Not on file   Housing Stability: Not on file     Prescription Medications as of 7/10/2023         Rx Number Disp Refills Start End Last Dispensed Date Next Fill Date Owning Pharmacy    albuterol (PROAIR HFA/PROVENTIL HFA/VENTOLIN HFA) 108 (90 Base) MCG/ACT inhaler  18 g  2023        Sig: Inhale 2 puffs into the lungs every 6 hours as needed for wheezing    Class: Transitional    Notes to Pharmacy: Pharmacy may dispense brand covered by insurance (Proair, or proventil or ventolin or generic albuterol inhaler)    Route: Inhalation    allopurinol (ZYLOPRIM) 100 MG tablet    2023        Sig: Take 1 tablet (100 mg) by mouth daily    Class: Transitional    Route: Oral    aspirin (ASA) 325 MG tablet    2023        Si tablet (325 mg)  by Oral or Feeding Tube route daily    Class: Transitional    Route: Oral or Feeding Tube    atorvastatin (LIPITOR) 20 MG tablet    2023        Si tablet (20 mg) by Oral or Feeding Tube route every evening    Class: Transitional    Route: Oral or Feeding Tube    bisacodyl (DULCOLAX) 10 MG suppository    2023        Sig: Place 1 suppository (10 mg) rectally daily as needed for constipation    Class: Transitional    Route: Rectal    bumetanide (BUMEX) 2 MG tablet    2023        Sig: Take 1 tablet (2 mg) by mouth 2 times daily    Class: Transitional    Route: Oral    diclofenac (VOLTAREN) 1 % topical gel    2023        Sig: Apply 4 g topically 3 times daily    Class: Transitional    Route: Topical    insulin aspart (NOVOLOG PEN) 100 UNIT/ML pen  15 mL  2023        Sig: Inject 1-12 Units Subcutaneous every 4 hours    Class: Transitional    Route: Subcutaneous    insulin aspart (NOVOLOG PEN) 100 UNIT/ML pen  15 mL  2023        Sig: Inject 1-7 Units Subcutaneous 3 times daily (with meals) DOSE:  1 units per 15 grams of carbohydrate.  Only chart total amount of units given.  Do not give if pre-prandial glucose is less than 60 mg/dL.  If given at mealtime, administer within 30 minutes of start of meal.    Class: Transitional    Route: Subcutaneous    insulin aspart (NOVOLOG PEN) 100 UNIT/ML pen  15 mL  2023        Sig: Inject 1-7 Units Subcutaneous Take with snacks or supplements for high blood sugar    Class: Transitional    Route: Subcutaneous    insulin glargine (LANTUS PEN) 100 UNIT/ML pen  15 mL  2023        Sig: Inject 10 Units Subcutaneous At Bedtime    Class: Transitional    Notes to Pharmacy: If Lantus is not covered by insurance, may substitute Basaglar or Semglee or other insulin glargine product per insurance preference at same dose and frequency.      Route: Subcutaneous    magnesium oxide (MAG-OX) 400 MG tablet    2023        Sig: Take 1 tablet (400 mg) by mouth  daily    Class: Transitional    Route: Oral    methocarbamol (ROBAXIN) 500 MG tablet    2023        Si tablet (500 mg) by Oral or Feeding Tube route 4 times daily as needed for muscle spasms    Class: Transitional    Route: Oral or Feeding Tube    metoprolol tartrate (LOPRESSOR) 25 MG tablet    2023        Si.5 tablets (12.5 mg) by Oral or Feeding Tube route every 8 hours    Class: Transitional    Route: Oral or Feeding Tube    multivitamin RENAL (RENAVITE RX/NEPHROVITE) 1 tablet tablet    2023        Si tablet by Oral or Feeding Tube route daily    Class: Transitional    Route: Oral or Feeding Tube    mycophenolate (GENERIC EQUIVALENT) 250 MG capsule    2023        Sig: Take 3 capsules (750 mg) by mouth 2 times daily    Class: Transitional    Route: Oral    nystatin (MYCOSTATIN) 993279 UNIT/ML suspension    2023        Sig: Take 5 mLs (500,000 Units) by mouth 4 times daily    Class: Transitional    Route: Oral    ondansetron (ZOFRAN ODT) 4 MG ODT tab    2023        Sig: Take 1 tablet (4 mg) by mouth every 6 hours as needed for nausea or vomiting    Class: Transitional    Route: Oral    oxyCODONE (ROXICODONE) 5 MG tablet   0 2023        Si.5 tablets (2.5 mg) by Oral or Feeding Tube route every 4 hours as needed for moderate pain    Class: Transitional    Earliest Fill Date: 2023    Route: Oral or Feeding Tube    pantoprazole (PROTONIX) 40 MG EC tablet    2023        Sig: Take 1 tablet (40 mg) by mouth every morning (before breakfast)    Class: Transitional    Route: Oral    polyethylene glycol (MIRALAX) 17 GM/Dose powder  510 g  2023        Sig: Take 17 g by mouth daily    Class: Transitional    Route: Oral    predniSONE (DELTASONE) 10 MG tablet    2023        Sig: Take 3 tablets (30 mg) by mouth daily    Class: Transitional    Route: Oral    predniSONE (DELTASONE) 10 MG tablet    2023        Sig: Take 3 tabs by mouth on , then 2 tabs  daily x 3 days (-).    Class: Transitional    predniSONE (DELTASONE) 10 MG tablet    2023        Si tablet (10 mg) by Per Feeding Tube route daily    Class: Transitional    Route: Per Feeding Tube    simethicone (MYLICON) 80 MG chewable tablet    2023        Si tablet (80 mg) by Oral or Feeding Tube route every 6 hours as needed for cramping    Class: Transitional    Route: Oral or Feeding Tube    sulfamethoxazole-trimethoprim (BACTRIM) 400-80 MG tablet    2023        Si tablet by Oral or Feeding Tube route three times a week    Class: Transitional    Route: Oral or Feeding Tube    tacrolimus (GENERIC EQUIVALENT) 1 MG capsule    2023        Sig: Take 2 capsules (2 mg) by mouth 2 times daily    Class: Transitional    Route: Oral    ursodiol (ACTIGALL) 250 MG tablet    2023        Sig: Take 1 tablet (250 mg) by mouth 2 times daily    Class: Transitional    Route: Oral    valGANciclovir (VALCYTE) 450 MG tablet    2023        Sig: Take 1 tablet (450 mg) by mouth every other day    Class: Transitional    Notes to Pharmacy: Titrate to 900 mg daily dose. CMV PPX (SLK).    Route: Oral          Hospital Medications as of 7/10/2023         Dose Frequency Start End    - Skin Test Reading -  EVERY 21 DAYS 2023    Admin Instructions: For tuberculosis diagnostic:  Adjust reading time if needed based on time of administration.  Reading must occur between 48-72 hours after administration.    Class: E-Prescribe    Route: Does not apply    acetaminophen (TYLENOL) Suppository 650 mg 650 mg EVERY 6 HOURS PRN 2023     Admin Instructions: Maximum acetaminophen dose from all sources = 75 mg/kg/day not to exceed 4 grams/day.    Class: E-Prescribe    Route: Rectal    acetaminophen (TYLENOL) tablet 650 mg 650 mg EVERY 4 HOURS PRN 2023     Admin Instructions: Maximum acetaminophen dose from all sources = 75 mg/kg/day not to exceed 4 grams/day.    Class: E-Prescribe     Route: Oral    albuterol (PROVENTIL HFA/VENTOLIN HFA) inhaler 2 puff EVERY 6 HOURS PRN 2023     Admin Instructions: Check the dose counter on the inhaler to ensure there are doses remaining before administering. Prime by spraying into the air 4 times prior to first use and if not used within 2 weeks.    Class: E-Prescribe    Notes to Pharmacy: PTA Sig:Inhale 2 puffs into the lungs every 6 hours as needed for wheezing      Route: Inhalation    allopurinol (ZYLOPRIM) tablet 100 mg 100 mg DAILY 2023     Admin Instructions: Indication: gout    Class: E-Prescribe    Notes to Pharmacy: PTA Sig:Take 1 tablet (100 mg) by mouth daily      Route: Oral    aspirin (ASA) tablet 325 mg 325 mg DAILY 2023     Admin Instructions: Indication: CAD    Class: E-Prescribe    Notes to Pharmacy: PTA Si tablet (325 mg) by Oral or Feeding Tube route daily      Route: Oral or Feeding Tube    atorvastatin (LIPITOR) tablet 20 mg 20 mg EVERY EVENING 2023     Admin Instructions: Indication: CAD    Class: E-Prescribe    Notes to Pharmacy: PTA Si tablet (20 mg) by Oral or Feeding Tube route every evening      Route: Oral or Feeding Tube    bisacodyl (DULCOLAX) suppository 10 mg 10 mg DAILY PRN 2023     Admin Instructions: Hold for loose stools.    Class: E-Prescribe    Notes to Pharmacy: PTA Sig:Place 1 suppository (10 mg) rectally daily as needed for constipation      Route: Rectal    bumetanide (BUMEX) tablet 2 mg 2 mg 2 TIMES DAILY 2023     Admin Instructions: Indication: diuresis  Schedule 0800 and 1600    Hold for sbp<100    Class: E-Prescribe    Notes to Pharmacy: PTA Sig:Take 1 tablet (2 mg) by mouth 2 times daily      Route: Oral    dextrose 50 % injection 25-50 mL 25-50 mL EVERY 15 MIN PRN 2023     Admin Instructions: Use if have IV access, BG less than 70 mg/dL and meet dose criteria below:  Dose if conscious and alert (or disorientated) and NPO = 25 mL  Dose if unconscious / not alert = 50  mL    Give first dose for initial blood glucose less than 70  mg/dL.  If blood glucose at 15 minute recheck is less than or equal to 100 mg/dL continue to administer carbohydrate treatment every 15 minutes, as needed, based on blood glucose and assessment parameters until blood glucose level is above 100 mg/dL.  Vesicant.    Class: E-Prescribe    Route: Intravenous    Linked Group 1: See [employer]ers[employer] for full Linked Orders Report.        diclofenac (VOLTAREN) 1 % topical gel 4 g 4 g 3 TIMES DAILY 6/25/2023     Admin Instructions: Indication: pain    Apply to affected area.   Send dosing card with product.    Class: E-Prescribe    Notes to Pharmacy: PTA Sig:Apply 4 g topically 3 times daily      Route: Topical    glucagon injection 1 mg 1 mg EVERY 15 MIN PRN 6/25/2023     Admin Instructions: May give SQ or IM. ONLY use glucagon IF patient has NO IV access AND is UNABLE to swallow AND blood glucose is LESS than or EQUAL to 50 mg/dL.    Class: E-Prescribe    Route: Subcutaneous    Linked Group 1: See [employer] for full Linked Orders Report.        glucose gel 15-30 g 15-30 g EVERY 15 MIN PRN 6/25/2023     Admin Instructions: Give first dose for initial blood glucose less than 70 mg/dL per the dosing instructions below.   If blood glucose at 15 minute rechecks is still less than or equal to 100 mg/dL, continue to administer doses per blood glucose parameters every 15 minutes, as needed, until blood glucose level is above 100 mg/dL.    Dosing Instructions:  ~If patient is conscious and able to swallow and NO enteral tube  For initial BG 51-69mg/dL OR 15 minute recheck BG 51- 100 mg/dL - give 15 g  For BG less than or equal to 50 mg/dL - give 30 g  ~ If Enteral tube  For initial BG 51-69mg/dL OR 15 minute recheck BG 51- 100 mg/dL - give apple juice 120 mL (4 oz or 15 g of CHO) via enteral tube  For BG less than or equal to 50 mg/dL - Give apple juice 240 mL (8 oz or 30 g of CHO) via enteral tube  ~Oral gel is preferable  for conscious and able to swallow patient.   ~IF gel unavailable or patient refuses may provide apple juice per Enteral tube dosing instructions.  Document juice on I and O flowsheet.    Class: E-Prescribe    Route: Oral    Linked Group 1: [facility] for [facility].        hydrocortisone (CORTAID) 1 % cream  2 TIMES DAILY 7/8/2023     Admin Instructions: Apply to hemorrhoids     Class: E-Prescribe    Route: Topical    insulin aspart (NovoLOG) injection (RAPID ACTING)  DAILY WITH BREAKFAST 6/26/2023     Admin Instructions: DOSE:  1 units per 12 grams of carbohydrate.  Only chart total amount of units given.  Do not give if pre-prandial glucose is less than 60 mg/dL.    Indication: DM2  If given at mealtime, administer within 30 minutes of start of meal.    Class: E-Prescribe    Route: Subcutaneous    insulin aspart (NovoLOG) injection (RAPID ACTING)  DAILY WITH LUNCH 6/26/2023     Admin Instructions: DOSE:  1 units per 12 grams of carbohydrate. Only chart total amount of units given.  Do not give if pre-prandial glucose is less than 60 mg/dL.  Indication: DM2  If given at mealtime, administer within 30 minutes of start of meal.    Class: E-Prescribe    Route: Subcutaneous    insulin aspart (NovoLOG) injection (RAPID ACTING)  DAILY WITH SUPPER 6/25/2023     Admin Instructions: DOSE:  1 units per 12 grams of carbohydrate. Only chart total amount of units given.  Do not give if pre-prandial glucose is less than 60 mg/dL.  Indication: DM2  If given at mealtime, administer within 30 minutes of start of meal.    Class: E-Prescribe    Route: Subcutaneous    insulin aspart (NovoLOG) injection (RAPID ACTING) 1-10 Units 3 TIMES DAILY BEFORE MEALS 6/25/2023     Admin Instructions: Correction Scale - HIGH INSULIN RESISTANCE DOSING     Do Not give Correction Insulin if Pre-Meal BG less than 140.   For Pre-Meal  - 164 give 1 unit.   For Pre-Meal  - 189 give 2 units.   For Pre-Meal  - 214 give 3  units.   For Pre-Meal  - 239 give 4 units.   For Pre-Meal  - 264 give 5 units.   For Pre-Meal  - 289 give 6 units.   For Pre-Meal  - 314 give 7 units.   For Pre-Meal  - 339 give 8 units.   For Pre-Meal  - 364 give 9 units.   For Pre-Meal BG greater than or equal to 365 give 10 units  To be given with prandial insulin, and based on pre-meal blood glucose.   Notify provider if glucose greater than or equal to 350 mg/dL after administration of correction dose.  Indication: DM2  If given at mealtime, administer within 30 minutes of start of meal.    Class: E-Prescribe    Route: Subcutaneous    insulin aspart (NovoLOG) injection (RAPID ACTING) 1-7 Units AT BEDTIME 2023     Admin Instructions: HIGH INSULIN RESISTANCE DOSING    Do Not give Bedtime Correction Insulin if BG less than 200.   For  - 224 give 1 units.   For  - 249 give 2 units.   For  - 274 give 3 units.   For  - 299 give 4 units.   For  - 324 give 5 units.   For  - 349 give 6 units.   For BG greater than or equal to 350 give 7 units.   Notify provider if glucose greater than or equal to 350 mg/dL after administration of correction dose.  Indication: DM2  If given at mealtime, administer within 30 minutes of start of meal.    Class: E-Prescribe    Route: Subcutaneous    insulin glargine (LANTUS PEN) injection 15 Units 15 Units AT BEDTIME 2023     Admin Instructions: Indication: DM2    Class: E-Prescribe    Notes to Pharmacy: PTA Sig:Inject 10 Units Subcutaneous At Bedtime      Route: Subcutaneous    magnesium oxide (MAG-OX) tablet 400 mg 400 mg 2 TIMES DAILY 2023     Class: E-Prescribe    Notes to Pharmacy: PTA Sig:Take 1 tablet (400 mg) by mouth daily      Route: Oral    methocarbamol (ROBAXIN) tablet 500 mg 500 mg 4 TIMES DAILY PRN 2023     Class: E-Prescribe    Notes to Pharmacy: PTA Si tablet (500 mg) by Oral or Feeding Tube route 4 times daily as needed for muscle  spasms      Route: Oral or Feeding Tube    metoprolol tartrate (LOPRESSOR) half-tab 12.5 mg 12.5 mg EVERY 8 HOURS 2023     Admin Instructions: Hold for SBP < 105 mm hg or HR < 60/min    Class: E-Prescribe    Notes to Pharmacy: PTA Si.5 tablets (12.5 mg) by Oral or Feeding Tube route every 8 hours      Route: Oral or Feeding Tube    multivitamin RENAL (RENAVITE RX/NEPHROVITE) tablet 1 tablet 1 tablet DAILY 2023     Class: E-Prescribe    Notes to Pharmacy: PTA Si tablet by Oral or Feeding Tube route daily      Route: Oral or Feeding Tube    mycophenolate (GENERIC EQUIVALENT) capsule 750 mg 750 mg 2 TIMES DAILY 2023     Admin Instructions: Indication: immunosuppression    Do not open capsule for use down feeding tube. Check if DRUG LEVEL is needed BEFORE administering dose.  This order specifically allows the use of GENERIC mycophenolate as an equivalent of CELLCEPT capsules.    When possible, take 1 to 2 hours apart from any product containing magnesium or aluminum.    Class: E-Prescribe    Notes to Pharmacy: PTA Sig:Take 3 capsules (750 mg) by mouth 2 times daily      Route: Oral    naloxone (NARCAN) injection 0.2 mg 0.2 mg EVERY 2 MIN PRN 2023     Admin Instructions: Administer intravenous route when available and notify provider when administered.  For unintended sedation or respiratory depression if all of the below criteria are met:  ~ respiratory rate LESS than or EQUAL to 8.   ~SaO2 less than 92% and or/end-tidal CO2 is greater than 50.  ~ the patient is receiving an opioid, has unintended sedations assessed as RASS (-3), and is currently not on mechanical ventilation.  RASS scale moderate (-3) is movement or eye opening to voice but no eye contact.    Patient Monitoring  Once the patient has demonstrated a response to the naloxone, continue to monitor respiratory rate, depth, oxygen saturation and end-tidal CO2 (if available) every 15 minutes x 2, then every 30 minutes x 2, then every  1 hour x 1 after each naloxone dose.  Consider transfer to ICU if patient respiratory parameters have not improved after 4 naloxone doses.    Class: E-Prescribe    Route: Intravenous    Linked Group 2: See Hyperspace for full Linked Orders Report.        naloxone (NARCAN) injection 0.2 mg 0.2 mg EVERY 2 MIN PRN 6/25/2023     Admin Instructions: Administer intramuscular if an intravenous route is not available and notify provider when administered.  For unintended sedation or respiratory depression if all of the below criteria are met:  ~ respiratory rate LESS than or EQUAL to 8.   ~SaO2 less than 92% and or/end-tidal CO2 is greater than 50.  ~ the patient is receiving an opioid, has unintended sedations assessed as RASS (-3), and is currently not on mechanical ventilation.  RASS scale moderate (-3) is movement or eye opening to voice but no eye contact.    Patient Monitoring  Once the patient has demonstrated a response to the naloxone, continue to monitor respiratory rate, depth, oxygen saturation and end-tidal CO2 (if available) every 15 minutes x 2, then every 30 minutes x 2, then every 1 hour x 1 after each naloxone dose.  Consider transfer to ICU if patient respiratory parameters have not improved after 4 naloxone doses.    Class: E-Prescribe    Route: Intramuscular    Linked Group 2: See Hyperspace for full Linked Orders Report.        naloxone (NARCAN) injection 0.4 mg 0.4 mg EVERY 2 MIN PRN 6/25/2023     Admin Instructions: Administer intravenous route when available and notify provider when administered.  For unintended sedation or respiratory depression if all of the below criteria are met:  ~ respiratory rate LESS than or EQUAL to 8.  ~ SaO2 less than 92% and or/end-tidal CO2 is greater than 50.  ~ the patient is receiving an opioid, has unintended sedation assessed as RASS (-4) or (-5) and patient is currently not on mechanical ventilation.  RASS scale (-4) is deep sedation with no response to voice but  movement or eye opening to physical stimulation.  RASS scale (-5) is unarousable.      Patient Monitoring  Once the patient has demonstrated a response to the naloxone, continue to monitor respiratory rate, depth, oxygen saturation and end-tidal CO2 (if available) every 15 minutes x 2, then every 30 minutes x 2, then every 1 hour x 1 after each naloxone dose.  Consider transfer to ICU if patient respiratory parameters have not improved after 4 naloxone doses.    Class: E-Prescribe    Route: Intravenous    Linked Group 2: See Hyperspace for full Linked Orders Report.        naloxone (NARCAN) injection 0.4 mg 0.4 mg EVERY 2 MIN PRN 6/25/2023     Admin Instructions: Administer intramuscular if an intravenous route is not available and notify provider when administered.  For unintended sedation or respiratory depression if all of the below criteria are met:  ~ respiratory rate LESS than or EQUAL to 8.  ~ SaO2 less than 92% and or/end-tidal CO2 is greater than 50.  ~ the patient is receiving an opioid, has unintended sedation assessed as RASS (-4) or (-5) and patient is currently not on mechanical ventilation.  RASS scale (-4) is deep sedation with no response to voice but movement or eye opening to physical stimulation.  RASS scale (-5) is unarousable.      Patient Monitoring  Once the patient has demonstrated a response to the naloxone, continue to monitor respiratory rate, depth, oxygen saturation and end-tidal CO2 (if available) every 15 minutes x 2, then every 30 minutes x 2, then every 1 hour x 1 after each naloxone dose.  Consider transfer to ICU if patient respiratory parameters have not improved after 4 naloxone doses.    Class: E-Prescribe    Route: Intramuscular    Linked Group 2: See Hyperspace for full Linked Orders Report.        Nurse may request from Pharmacy a change of form of medication (e.g. Liquid to tablet).  CONTINUOUS PRN 6/25/2023     Admin Instructions: Nurse may request from Pharmacy a change  of form of medication (e.g. Liquid to tablet).    Class: E-Prescribe    Route: Does not apply    nystatin (MYCOSTATIN) suspension 500,000 Units 500,000 Units 4 TIMES DAILY 2023     Admin Instructions: Shake well    Class: E-Prescribe    Notes to Pharmacy: PTA Sig:Take 5 mLs (500,000 Units) by mouth 4 times daily      Route: Oral    ondansetron (ZOFRAN ODT) ODT tab 4 mg 4 mg EVERY 6 HOURS PRN 2023     Admin Instructions: This is Step 1 of nausea and vomiting management.  If nausea not resolved in 15 minutes, go to Step 2 prochlorperazine (COMPAZINE).  With dry hands, peel back foil backing and gently remove tablet. Do not push oral disintegrating tablet through foil backing. Administer immediately on tongue and oral disintegrating tablet dissolves in seconds, then swallow with saliva. Liquid not required.    Class: E-Prescribe    Route: Oral    Linked Group 3: See Hyperspace for full Linked Orders Report.        ondansetron (ZOFRAN) injection 4 mg 4 mg EVERY 6 HOURS PRN 2023     Admin Instructions: Give IF patient unable to tolerate oral medication.  This is Step 1 of nausea and vomiting management. If nausea not resolved in 15 minutes, go to Step 2 prochlorperazine (COMPAZINE).  Irritant.    Class: E-Prescribe    Route: Intravenous    Linked Group 3: See Hyperspace for full Linked Orders Report.        oxyCODONE IR (ROXICODONE) half-tab 2.5 mg 2.5 mg EVERY 4 HOURS PRN 2023     Notes to Pharmacy: PTA Si.5 tablets (2.5 mg) by Oral or Feeding Tube route every 4 hours as needed for moderate pain      Route: Oral or Feeding Tube    pantoprazole (PROTONIX) EC tablet 40 mg 40 mg EVERY MORNING BEFORE BREAKFAST 2023     Admin Instructions: Indication: GI prophylaxis    DO NOT CRUSH.    Class: E-Prescribe    Notes to Pharmacy: PTA Sig:Take 1 tablet (40 mg) by mouth every morning (before breakfast)      Route: Oral    Patient is already receiving anticoagulation with heparin, enoxaparin (LOVENOX),  warfarin (COUMADIN)  or other anticoagulant medication  CONTINUOUS PRN 2023     Class: E-Prescribe    Route: Does not apply    polyethylene glycol (MIRALAX) Packet 17 g 17 g DAILY 2023     Admin Instructions: Indication: constipation  1 Packet = 17 grams. Mix each gram with at least 1/2 ounce (15 mL) of water -   8 ounces for 17 g dose, 4 ounces for 8.5 g dose, 2 ounces for 4 g dose.  Follow with the same volume of water.  Hold for loose stools unless being administered as part of a bowel prep regimen or bowel clean out.    Class: E-Prescribe    Notes to Pharmacy: PTA Sig:Take 17 g by mouth daily      Route: Oral    potassium chloride ER (KLOR-CON M) CR tablet 20 mEq (Completed) 20 mEq ONCE 2023    Admin Instructions: Potassium level 2.7 - 3 mmol/L  Ordered from the Potassium replacement order set.  DO NOT CRUSH.    Class: E-Prescribe    Route: Oral    potassium chloride ER (KLOR-CON M) CR tablet 40 mEq (Completed) 40 mEq ONCE 7/10/2023 7/10/2023    Admin Instructions: Potassium level 3.1 - 3.4 mmol/L  Ordered from the Potassium replacement order set.  DO NOT CRUSH.    Class: E-Prescribe    Route: Oral    potassium chloride ER (KLOR-CON M) CR tablet 40 mEq (Completed) 40 mEq ONCE 2023    Admin Instructions: Potassium level 2.7 - 3 mmol/L  Ordered from the Potassium replacement order set.  DO NOT CRUSH.    Class: E-Prescribe    Route: Oral    predniSONE (DELTASONE) tablet 5 mg 5 mg DAILY 7/10/2023     Class: E-Prescribe    Route: Oral    pregabalin (LYRICA) capsule 25 mg 25 mg 3 TIMES DAILY 7/3/2023     Class: E-Prescribe    Route: Oral    simethicone (MYLICON) chewable tablet 80 mg 80 mg EVERY 6 HOURS PRN 2023     Class: E-Prescribe    Notes to Pharmacy: PTA Si tablet (80 mg) by Oral or Feeding Tube route every 6 hours as needed for cramping      Route: Oral or Feeding Tube    sodium chloride (PF) 0.9% PF flush 3 mL 3 mL EVERY 8 HOURS 2023     Admin Instructions: to  lock peripheral IV dormant line    Class: E-Prescribe    Route: Intracatheter    sodium chloride (PF) 0.9% PF flush 3 mL 3 mL EVERY 1 MIN PRN 7/4/2023     Class: E-Prescribe    Route: Intracatheter    sulfamethoxazole-trimethoprim (BACTRIM) 400-80 MG per tablet 1 tablet 1 tablet DAILY 7/1/2023     Admin Instructions: Dose adjusted per renal dosing policy.  Estimated CrCl = >30mL/min.       Class: E-Prescribe    Route: Oral    tacrolimus (GENERIC EQUIVALENT) capsule 1.5 mg 1.5 mg 2 TIMES DAILY 7/5/2023     Admin Instructions: Check if DRUG LEVEL is needed BEFORE administering dose.  This order specifically allows the use of a generic equivalent of tacrolimus (PROGRAF) capsules.    Class: E-Prescribe    Notes to Pharmacy: PTA Sig:Take 2 capsules (2 mg) by mouth 2 times daily      Route: Oral    tuberculin injection 5 Units 5 Units EVERY 21 DAYS 6/26/2023 8/7/2023    Admin Instructions: For tuberculosis diagnostic without controls.  Read skin test in 48 to 72 hours.  Adjust reading time if needed based on time of administration.   Repeat Mantoux test 3 weeks after the initial test if result is negative.  If test is positive, RN charting the positive result should discontinue this order.    Class: E-Prescribe    Route: Intradermal    ursodiol (ACTIGALL) tablet 250 mg 250 mg 2 TIMES DAILY 6/25/2023     Class: E-Prescribe    Notes to Pharmacy: PTA Sig:Take 1 tablet (250 mg) by mouth 2 times daily      Route: Oral    valGANciclovir (VALCYTE) tablet 450 mg 450 mg DAILY 7/3/2023     Admin Instructions: Dose adjusted per renal dosing policy.  Estimated CrCl = 40-59 mL/min.  Do Not Crush    Class: E-Prescribe    Notes to Pharmacy: PTA Sig:Take 1 tablet (450 mg) by mouth every other day      Route: Oral    warfarin ANTICOAGULANT (COUMADIN) half-tab 1.25 mg 1.25 mg ONCE AT 6PM 7/10/2023     Class: E-Prescribe    Route: Oral    warfarin ANTICOAGULANT (COUMADIN) half-tab 1.5 mg (Completed) 1.5 mg ONCE AT 6PM 7/9/2023 7/9/2023     Class: E-Prescribe    Route: Oral    Warfarin Dose Required Daily - Pharmacist Managed 1 each SEE ADMIN INSTRUCTIONS 6/25/2023     Admin Instructions: *Note to reorder warfarin daily*  Nurse to contact pharmacist if dose not ordered by 6 PM.  Pharmacy Warfarin Dosing Service  Patient is on Warfarin Therapy - check for daily order    Class: E-Prescribe    Route: Oral          Patient has no known allergies.   REVIEW OF SYSTEMS (check box if normal)  [x]               GENERAL  [x]                 PULMONARY [x]                GENITOURINARY  [x]                CNS                 [x]                 CARDIAC  [x]                 ENDOCRINE  [x]                EARS,NOSE,THROAT [x]                 GASTROINTESTINAL []                 NEUROLOGIC    [x]                MUSCLOSKELTAL  [x]                  HEMATOLOGY  Patient reports abdominal discomfort and urgency as well as joint pain in bilateral hands and feet due to gout.    PHYSICAL EXAM (check box if normal)/81   Pulse 109   Wt 96.8 kg (213 lb 4.8 oz)   SpO2 100%   BMI 33.41 kg/m          [x]            GENERAL:    [x]       EYES:  ICTERIC   []        YES  []                    NO  [x]           EXTREMITIES: Clubbing []       Y     [x]           N    [x]           EARS, NOSE, THROAT: Membranes Moist    YES   [x]                   NO []                  [x]           LUNGS:  CLEAR    YES       [x]                  NO    []                                [x]           SKIN: Jaundice           YES       []                  NO    [x]                   Rash: YES       []                  NO    [x]                                     [x]             HEART: Regular Rate          YES       [x]                  NO    []                   Incision Clean:  YES       [x]                  NO    []                                [x]                    ABDOMEN: wound clean, dry, intact Organomegaly YES       []                  NO    [x]                       [x]                     NEUROLOGICAL:  Nonfocal  YES       [x]                  NO    []                       [x]                    Hernia YES       [x]                  NO    [x]                   PSYCHIATRIC:  Appropriate  YES       [x]                  NO    []                       OTHER: Lower abdominal wall hernia, present before transplant                                                                                                   PAIN SCALE::1     Bao Elena MD PGY1 Resident  Transplant Surgery Team    I have reviewed history, examined patient and discussed plan with the fellow/resident/GEORGE.  I concur with the findings in this note.       Immunosuppressive regimen, management and long term risks discussed in detail. Changes, when applicable made as per orders.    Total time: 20 min  Counseling time: 10 min

## 2023-07-10 NOTE — PLAN OF CARE
Goal Outcome Evaluation:  No acute issues overnight. Sleeping between cares. Continent B&B, uses urinal indep.- staff empties and got up to BSC, SPT w/assist of 2/gaitbelt, continent small formed BM. Did have 2 lrg.BMs yesterday. Requested and rec'd Simethicone x1. On standard Mg./K replacement - K+ replaced yesterday and recheck @ 2400 = 3.6. Labs this AM. Pt.has Hepatology appt.this afternoon - pickup ~ 1300.        Patient's most recent vital signs are:     Vital signs:  BP: 118/75  Temp: 96.5  HR: 94  RR: 18  SpO2: 96 %     Patient does not have new respiratory symptoms.  Patient does not have new sore throat.  Patient does not have a fever greater than 99.5.

## 2023-07-10 NOTE — PROGRESS NOTES
Transplant Surgery Immunosuppression Management Note:  Damion Quinones is a 67 year old male with history of cirrhosis (alcohol + hemochromatosis), ESKD (IgA nephropathy + ANCA vasculitis), PAF, obesity, HTN, pre-diabetes, rheumatoid and psoriatic arthritis, and CAD. S/p simultaneous liver and kidney transplant on 6/6/2023 complicated by DGF, AF RVR, shock, gout flare, right internal jugular clot, and malnutrition.      POD # 34    Liver transplant w/ complex reconstruction of accessory right hepatic artery: No biliary stent. US liver on 7/5 due to increased transaminases. No acute findings, normal doppler. AST/ALT/Alk phos increased today. Discussed with Dr. Dc, monitor for now.    Kidney transplant w/ stent, delayed graft function:   Cr 1.8->1.4. 6/20 biopsy: ATN, no rejection.     Immunosuppression:  Induction: Steroid taper and Thymoglobulin 400 mg (4 mg/kg).   Maintenance:   -  mg BID  - Tacrolimus goal level 5-6 per Dr. Clifton on 7/3. Level 3.7, dose increased. Levels M/Th.  - Prednisone 5mg daily per Dr. Clifton (reduced dose on 7/3).   - Prednisone 20 mg  pulse for gout flare.  Infection prophylaxis: PJP (bactrim), viral (Valcyte x 12 weeks)    Transplant coordinator: Cindy Clay (Liver), Angelita Torres (Kidney) [phone]  Donor type: DBD  DSA at time of transplant:  Yes CW9 6/5/23  Ureteral stent: Yes  Biliary stent: No    Kim Poe NP   [phone]

## 2023-07-10 NOTE — PROGRESS NOTES
Alert and oriented times four. Wife at bedside. Very involved with cares.  and . Waiting on pharmacy to refill insulin pen. Last BM 7/9. Uses bedside urinal. Picked up for hepatology appointment at 1300. Potassium replaced. PIV right lower arm intact.     Patient's most recent vital signs are:     Vital signs:  BP: 108/67  Temp: 98  HR: 103  RR: 18  SpO2: 100 %     Patient does not have new respiratory symptoms.  Patient does not have new sore throat.  Patient does not have a fever greater than 99.5.

## 2023-07-10 NOTE — PROGRESS NOTES
CLINICAL NUTRITION SERVICES - REASSESSMENT NOTE     Nutrition Prescription    RECOMMENDATIONS FOR MDs/PROVIDERS TO ORDER:  None    Malnutrition Status:    Patient does not meet two of the established criteria necessary for diagnosing malnutrition but is at risk for malnutrition    Recommendations already ordered by Registered Dietitian (RD):  None today    Future/Additional Recommendations:  Monitor labs, intakes, and weight trends.     EVALUATION OF THE PROGRESS TOWARD GOALS   Diet: Regular  Intake/[place]: mostly 100% of documented meals. Poorly documented over last couple days  Snacks/supplements: PRN      NEW FINDINGS/REVIEW OF SYSTEMS    Nutrition/GI: RD met with pt and wife at bedside. Pt reports good appetite/intakes. No questions or concerns at this time.     Weights: Pt with large weight fluctuations during admission to [place], likely fluid related, however now at similar weight to 5 months ago. Pt with 5 lb (2%) weight loss in 2 weeks since admission however possible scale differences. Seems weight stable from 6/26.   07/10/23 96.8 kg (213 lb 4.8 oz)    07/08/23 96.3 kg (212 lb 4.9 oz)   07/07/23 96.5 kg (212 lb 11.9 oz)   07/01/23 95.8 kg (211 lb 3.2 oz)   06/29/23 95.4 kg (210 lb 5.1 oz)   06/26/23 96 kg (211 lb 10.3 oz)   06/25/23 98.5 kg (217 lb 2.5 oz)   06/22/23 101.7 kg (224 lb 3.3 oz)   06/20/23 102.6 kg (226 lb 3.1 oz)   06/17/23 107.6 kg (237 lb 3.4 oz)   06/13/23 108.5 kg (239 lb 3.2 oz)   06/10/23 113.8 kg (250 lb 14.1 oz)   06/07/23 117.6 kg (259 lb 4.2 oz)   06/05/23 102.3 kg (225 lb 8 oz)     05/19/23 103.4 kg (228 lb)   05/17/23 104.3 kg (230 lb)   05/11/23 99.8 kg (220 lb)   05/10/23 106.9 kg (235 lb 9.6 oz)   05/01/23 102 kg (224 lb 12.8 oz)   04/27/23 98.9 kg (218 lb)   04/19/23 100.8 kg (222 lb 5 oz)   04/10/23 99 kg (218 lb 3.2 oz)   03/21/23 101.1 kg (222 lb 14.4 oz)   03/09/23 98.4 kg (217 lb)   03/03/23 99.7 kg (219 lb 11.2 oz)   03/01/23 99.8 kg (220 lb)   02/17/23 99.8 kg  (220 lb)   02/09/23 96.5 kg (212 lb 11.9 oz)   02/09/23 96.2 kg (212 lb)   02/06/23 94.8 kg (209 lb 1 oz)   02/01/23 96.6 kg (213 lb)   01/27/23 96.5 kg (212 lb 12.8 oz)     Skin: surgical incisions     Labs reviewed   07/07/23 10:10 07/07/23 20:12 07/08/23 07:46 07/09/23 09:19 07/09/23 23:53 07/10/23 06:13   Sodium 129 (L)     136   Potassium 3.0 (L) 4.2 3.4 3.0 (L) 3.6 3.4   Urea Nitrogen 67.9 (H)     54.2 (H)   Creatinine 1.78 (H)     1.41 (H)   Magnesium 1.7  2.3 1.8  1.6 (L)   Phosphorus 2.5     2.2 (L)   Albumin 2.6 (L)     2.9 (L)   Alkaline Phosphatase 352 (H)     433 (H)   ALT 68     106 (H)   AST 45     55 (H)   Glucose 218 (H)     105 (H)       07/09/23 06:48 07/09/23 12:26 07/09/23 17:26 07/09/23 20:29 07/10/23 07:44   Glucose 133 (H) 195 (H) 214 (H) 238 (H) 108 (H)     Medications reviewed: bumex, insulin (novolog, lantus), mag ox, renal multivitamin, mycophenolate, protonix, miralax last given 6/30, prednisone, bactrim, tacrolimus, warfarin    MALNUTRITION  % Intake: No decreased intake noted  % Weight Loss: Difficult to assess.   Subcutaneous Fat Loss: None observed  Muscle Loss: Mild  Fluid Accumulation/Edema: Moderate hand and ankle/foot edema  Malnutrition Diagnosis: Patient does not meet two of the established criteria necessary for diagnosing malnutrition but is at risk for malnutrition    Previous Goals   Patient to consume % of nutritionally adequate meal trays TID, or the equivalent with supplements/snacks.  Evaluation: Met    Previous Nutrition Diagnosis  Predicted inadequate protein-energy intake related to variable appetite as evidenced by pt reliant on PO intakes to meet 100% of nutritional needs with potential for variation  Evaluation: No change    CURRENT NUTRITION DIAGNOSIS  Predicted inadequate protein-energy intake related to variable appetite as evidenced by pt reliant on PO intakes to meet 100% of nutritional needs with potential for  variation    INTERVENTIONS  Implementation  None today    Goals  Patient to consume % of nutritionally adequate meal trays TID, or the equivalent with supplements/snacks.    Monitoring/Evaluation  Progress toward goals will be monitored and evaluated per protocol.    Shaunna Piper MS, RDN, LD  RD pager: [phone]  [facility] Weekend/Holiday Pager: [phone]

## 2023-07-10 NOTE — PLAN OF CARE
The patient is alert and oriented x 3. VSS. Able to make needs known. Appetite is good. Simethicone x 1 for bloat. Mepilex to sacrum for comfort. Up in the chair for meals. Steri stips to abdomen are intact. Potassium 3.0 today. Potasium  60 jose PO for replacement. Recheck potassium level around midnight. Continue to monitor for comfort.    Patient's most recent vital signs are:     Vital signs:  BP: 109/71  Temp: 96.5  HR: 111  RR: 18  SpO2: 96 %     Patient does not have new respiratory symptoms.  Patient does not have new sore throat.  Patient does not have a fever greater than 99.5.

## 2023-07-10 NOTE — PROGRESS NOTES
Progress note    No acute events over the weekend.  Gout flare including.  Patient has an appointment with transplant team this afternoon, he was to discuss current dose of steroids with transplant team.  He received prednisone p.o. 20 mg daily for the last 5 days for gout flare and dose returned to 5 mg daily today.  Hemoglobin 7.0 today, no signs of bleeding.  Continue monitoring hemoglobin tomorrow and transfuse if less than 7.

## 2023-07-10 NOTE — PROVIDER NOTIFICATION
Informed Dr. Wong regarding pts Hgb level of 7.0. Recheck tomorrow per Dr. Wong.    POC:  Potassium replacement ordered and recheck ordered  Mg recheck edi.

## 2023-07-11 ENCOUNTER — APPOINTMENT (OUTPATIENT)
Dept: OCCUPATIONAL THERAPY | Facility: SKILLED NURSING FACILITY | Age: 68
End: 2023-07-11
Attending: INTERNAL MEDICINE
Payer: COMMERCIAL

## 2023-07-11 ENCOUNTER — APPOINTMENT (OUTPATIENT)
Dept: PHYSICAL THERAPY | Facility: SKILLED NURSING FACILITY | Age: 68
End: 2023-07-11
Attending: INTERNAL MEDICINE
Payer: COMMERCIAL

## 2023-07-11 LAB
GLUCOSE BLDC GLUCOMTR-MCNC: 133 MG/DL (ref 70–99)
GLUCOSE BLDC GLUCOMTR-MCNC: 144 MG/DL (ref 70–99)
GLUCOSE BLDC GLUCOMTR-MCNC: 154 MG/DL (ref 70–99)
GLUCOSE BLDC GLUCOMTR-MCNC: 276 MG/DL (ref 70–99)
HOLD SPECIMEN: NORMAL
INR PPP: 1.63 (ref 0.85–1.15)
MAGNESIUM SERPL-MCNC: 1.5 MG/DL (ref 1.7–2.3)
MAGNESIUM SERPL-MCNC: 2.6 MG/DL (ref 1.7–2.3)
POTASSIUM SERPL-SCNC: 3.8 MMOL/L (ref 3.4–5.3)

## 2023-07-11 PROCEDURE — 250N000011 HC RX IP 250 OP 636: Mod: JZ | Performed by: INTERNAL MEDICINE

## 2023-07-11 PROCEDURE — 99310 SBSQ NF CARE HIGH MDM 45: CPT | Performed by: INTERNAL MEDICINE

## 2023-07-11 PROCEDURE — 120N000009 HC R&B SNF

## 2023-07-11 PROCEDURE — 250N000013 HC RX MED GY IP 250 OP 250 PS 637: Performed by: INTERNAL MEDICINE

## 2023-07-11 PROCEDURE — 83735 ASSAY OF MAGNESIUM: CPT | Performed by: INTERNAL MEDICINE

## 2023-07-11 PROCEDURE — 85610 PROTHROMBIN TIME: CPT | Performed by: INTERNAL MEDICINE

## 2023-07-11 PROCEDURE — 250N000012 HC RX MED GY IP 250 OP 636 PS 637: Performed by: INTERNAL MEDICINE

## 2023-07-11 PROCEDURE — 250N000012 HC RX MED GY IP 250 OP 636 PS 637: Performed by: NURSE PRACTITIONER

## 2023-07-11 PROCEDURE — 97530 THERAPEUTIC ACTIVITIES: CPT | Mod: GO

## 2023-07-11 PROCEDURE — 97110 THERAPEUTIC EXERCISES: CPT | Mod: GP

## 2023-07-11 PROCEDURE — 97530 THERAPEUTIC ACTIVITIES: CPT | Mod: GP

## 2023-07-11 PROCEDURE — 84132 ASSAY OF SERUM POTASSIUM: CPT | Performed by: INTERNAL MEDICINE

## 2023-07-11 PROCEDURE — 36415 COLL VENOUS BLD VENIPUNCTURE: CPT | Performed by: INTERNAL MEDICINE

## 2023-07-11 RX ORDER — MAGNESIUM SULFATE HEPTAHYDRATE 40 MG/ML
4 INJECTION, SOLUTION INTRAVENOUS ONCE
Status: COMPLETED | OUTPATIENT
Start: 2023-07-11 | End: 2023-07-11

## 2023-07-11 RX ORDER — PREGABALIN 25 MG/1
25 CAPSULE ORAL 2 TIMES DAILY
Status: DISCONTINUED | OUTPATIENT
Start: 2023-07-11 | End: 2023-07-14

## 2023-07-11 RX ADMIN — PREDNISONE 5 MG: 5 TABLET ORAL at 09:04

## 2023-07-11 RX ADMIN — MAGNESIUM OXIDE TAB 400 MG (241.3 MG ELEMENTAL MG) 400 MG: 400 (241.3 MG) TAB at 21:28

## 2023-07-11 RX ADMIN — SULFAMETHOXAZOLE AND TRIMETHOPRIM 1 TABLET: 400; 80 TABLET ORAL at 09:04

## 2023-07-11 RX ADMIN — INSULIN ASPART 1 UNITS: 100 INJECTION, SOLUTION INTRAVENOUS; SUBCUTANEOUS at 18:27

## 2023-07-11 RX ADMIN — DICLOFENAC SODIUM 4 G: 10 GEL TOPICAL at 09:05

## 2023-07-11 RX ADMIN — INSULIN ASPART 4 UNITS: 100 INJECTION, SOLUTION INTRAVENOUS; SUBCUTANEOUS at 21:28

## 2023-07-11 RX ADMIN — BUMETANIDE 2 MG: 1 TABLET ORAL at 09:05

## 2023-07-11 RX ADMIN — TACROLIMUS 2 MG: 1 CAPSULE ORAL at 09:05

## 2023-07-11 RX ADMIN — BUMETANIDE 2 MG: 1 TABLET ORAL at 16:30

## 2023-07-11 RX ADMIN — MYCOPHENOLATE MOFETIL 750 MG: 250 CAPSULE ORAL at 09:05

## 2023-07-11 RX ADMIN — URSODIOL 250 MG: 250 TABLET ORAL at 21:28

## 2023-07-11 RX ADMIN — SIMETHICONE 80 MG: 80 TABLET, CHEWABLE ORAL at 08:52

## 2023-07-11 RX ADMIN — INSULIN ASPART 5 UNITS: 100 INJECTION, SOLUTION INTRAVENOUS; SUBCUTANEOUS at 18:26

## 2023-07-11 RX ADMIN — ATORVASTATIN CALCIUM 20 MG: 10 TABLET, FILM COATED ORAL at 21:27

## 2023-07-11 RX ADMIN — VALGANCICLOVIR HYDROCHLORIDE 450 MG: 450 TABLET ORAL at 21:28

## 2023-07-11 RX ADMIN — MYCOPHENOLATE MOFETIL 750 MG: 250 CAPSULE ORAL at 18:29

## 2023-07-11 RX ADMIN — PREDNISONE 25 MG: 5 TABLET ORAL at 13:11

## 2023-07-11 RX ADMIN — INSULIN GLARGINE 15 UNITS: 100 INJECTION, SOLUTION SUBCUTANEOUS at 21:28

## 2023-07-11 RX ADMIN — TACROLIMUS 2 MG: 1 CAPSULE ORAL at 18:29

## 2023-07-11 RX ADMIN — INSULIN ASPART 1 UNITS: 100 INJECTION, SOLUTION INTRAVENOUS; SUBCUTANEOUS at 08:58

## 2023-07-11 RX ADMIN — PREGABALIN 25 MG: 25 CAPSULE ORAL at 09:04

## 2023-07-11 RX ADMIN — MAGNESIUM SULFATE HEPTAHYDRATE 4 G: 40 INJECTION, SOLUTION INTRAVENOUS at 11:08

## 2023-07-11 RX ADMIN — NYSTATIN 500000 UNITS: 500000 SUSPENSION ORAL at 21:28

## 2023-07-11 RX ADMIN — NYSTATIN 500000 UNITS: 500000 SUSPENSION ORAL at 13:12

## 2023-07-11 RX ADMIN — Medication 12.5 MG: at 06:33

## 2023-07-11 RX ADMIN — INSULIN ASPART 12 UNITS: 100 INJECTION, SOLUTION INTRAVENOUS; SUBCUTANEOUS at 08:59

## 2023-07-11 RX ADMIN — NYSTATIN 500000 UNITS: 500000 SUSPENSION ORAL at 09:04

## 2023-07-11 RX ADMIN — PANTOPRAZOLE SODIUM 40 MG: 40 TABLET, DELAYED RELEASE ORAL at 06:33

## 2023-07-11 RX ADMIN — URSODIOL 250 MG: 250 TABLET ORAL at 09:04

## 2023-07-11 RX ADMIN — ALLOPURINOL 100 MG: 100 TABLET ORAL at 09:04

## 2023-07-11 RX ADMIN — B-COMPLEX W/ C & FOLIC ACID TAB 1 MG 1 TABLET: 1 TAB at 13:12

## 2023-07-11 RX ADMIN — POLYETHYLENE GLYCOL 3350 17 G: 17 POWDER, FOR SOLUTION ORAL at 09:15

## 2023-07-11 RX ADMIN — WARFARIN SODIUM 1.25 MG: 2.5 TABLET ORAL at 18:29

## 2023-07-11 RX ADMIN — ASPIRIN 325 MG: 325 TABLET, FILM COATED ORAL at 09:05

## 2023-07-11 RX ADMIN — MAGNESIUM OXIDE TAB 400 MG (241.3 MG ELEMENTAL MG) 400 MG: 400 (241.3 MG) TAB at 13:12

## 2023-07-11 RX ADMIN — PREGABALIN 25 MG: 25 CAPSULE ORAL at 21:28

## 2023-07-11 RX ADMIN — NYSTATIN 500000 UNITS: 500000 SUSPENSION ORAL at 18:29

## 2023-07-11 RX ADMIN — INSULIN ASPART: 100 INJECTION, SOLUTION INTRAVENOUS; SUBCUTANEOUS at 13:06

## 2023-07-11 ASSESSMENT — ACTIVITIES OF DAILY LIVING (ADL)
ADLS_ACUITY_SCORE: 36

## 2023-07-11 NOTE — PROGRESS NOTES
"   07/11/23 1015   Appointment Info   Signing Clinician's Name / Credentials (OT) Jayshree Reese, OTR/L,CLAUDETTE   OT Goals   Therapy Frequency (OT) 6 times/wk   OT Predicted Duration/Target Date for Goal Attainment 07/26/23   OT: Hygiene/Grooming modified independent;within precautions   OT: Upper Body Dressing Modified independent;within precautions   OT: Lower Body Dressing Modified independent;within precautions   OT: Upper Body Bathing Supervision/stand-by assist;within precautions   OT: Lower Body Bathing with precautions;Minimal assist;using adaptive equipment   OT: Toilet Transfer/Toileting Modified independent;within precautions   OT: Meal Preparation   (NA, not barrier to d/c, wife to complete Iadls PRN)   OT: Home Management   (NA, not barrier to d/c, wife to complete Iadls PRN)   OT: Cognitive Completed   Self-Care/Home Management   Treatment Detail/Skilled Intervention OT: pt dressed on th arrival in street clothes and socks/shoes, reported min A to serafin shorts , setup for shirt and max A for socks/shoes   Therapeutic Activities   Therapeutic Activity Minutes (21101) 45   Treatment Detail/Skilled Intervention OT: repeated STS from recliner and w/c w/ cga to Jimmy,SPT w/ cga to min A, jade dynamic standing task at tabletop alternating use of L and Rue for reaching while using opposite ue to stablize on table and hips against table . jade standing 1'30\", 1'40\", 1'35\" w/ cga each standing and rest between attempts, th provided marcos ue scap mobilization to focus on decreasing mm tightness and increasing ROM for scap rotation and glide and increase comfort and assist w/ improving posture, th provided tactile and verbal cues for improving trunk ext and neck ext w/ improved upright posture, resulted in improved marcos sh flex (full ROM w/ AAROM gentle stretch end range).   OT Discharge Planning   OT Plan OT: abd precautions, fall, monitor cognition, Tx: EOB/EOM for core/trunk strength, functional stand/squat pivot " transfers, ADLs from w/c/recliner/EOB or EOS, trial bed-based bridging for LBD, STS at counter height table with min to CGA of 1 to work on standing tolerance (use dycem near corners of table for pt to  and pull self up to standing). Has 3# dowel for ex in room outside of therapy.   OT Discharge Recommendation (DC Rec) home with assist;home with home care occupational therapy   OT Brief overview of current status OT: pt making significant improvements w/ functional mobility and activity jade needed for improvng indep w/ adls and decreasing burden of care.   Total Session Time   Timed Code Treatment Minutes 45   Total Session Time (sum of timed and untimed services) 45   Post Acute Settings Only   What unit is patient on? TCU

## 2023-07-11 NOTE — PLAN OF CARE
Goal Outcome Evaluation:    FOCUS/GOAL    Bowel management, Bladder management, Medication management, Wound care management, Mobility, Skin integrity and Safety management    ASSESSMENT, INTERVENTIONS AND CONTINUING PLAN FOR GOAL:    Pt is A&OX4, calm, & cooperative with care. Denied CP, SOB, & n/v. VSS & on RA. On 2L O2 via NC at the bedtime. A of 2 with Liko lift for transfer. Spent the whole shift on a recliner & requested to spent the night shift on a recliner. Continent for both B&B; uses BSC. Urinal at the bedside. Takes meds whole with thin liquid. Notified the provider that patient is requesting for 20 mg of prednisone in addition to 5 mg that was given on am shift. Provider ordered 20 mg of prednisone per transplant provider's note & given. Potassium level is 3.8 after replacement on am shift. AM lab redraw scheduled for 07/11. Pt's spouse spent the first 2 hrs of the shift @ the bedside. Pt ate dinner with good appetite & no acute issue. Able to make needs known & call light within reach. Will continue with plan of care.    Patient's most recent vital signs are:     Vital signs:  BP: 129/78  Temp: 97.2  HR: 104  RR: 18  SpO2: 97 %     Patient does not have new respiratory symptoms.  Patient does not have new sore throat.  Patient does not have a fever greater than 99.5.

## 2023-07-11 NOTE — CARE PLAN
Care plan updated. Bedside RN to assess on progression and update as needed.     Problem: Liver Transplant  Goal: Effective Organ Function  Description: Acknowledge unpredictable nature of the disease; assist with maintaining realistic hope.    Assess and monitor patient and caregiver perspective on quality of life and personal wellbeing; consider palliative care consult to enhance symptom relief, quality of life and advance care planning.    Assist with life transitions associated with liver failure (e.g., adherence to medical regimen, change in family dynamics or roles).    Support coping by recognizing current coping strategies; provide aid in developing new strategies.    Assess and monitor for signs and symptoms of anxiety and depression.    Screen for presence of alcohol misuse or addiction; provide brief intervention and referral for treatment.       Problem: Kidney Transplant  Goal: Optimal Coping with Organ Transplant  Description: Acknowledge, normalize, validate intensity and complexity of patient/support system response to surgery and posttransplant recovery.    Provide opportunity for expression of thoughts, feelings and concerns (e.g., guilt related to donor, fear of organ rejection).    Involve patient and support system in decision-making and care to enhance sense of control.    Acknowledge and validate significance of life-long lifestyle changes and expectations (e.g., adherence to medical regimens, health habits, financial impact).    Recognize current coping strategies and assist in developing new strategies (e.g., gratitude, optimism).    Assess and monitor for signs and symptoms of psychological distress, anxiety and depression.    Encourage support network development and posttransplant support group participation.           Problem: Malnutrition  Goal: Improved Nutritional Intake  Description: Perform a nutrition assessment; include a nutrition-focused physical exam.    Determine calorie,  protein, vitamin, mineral and fluid requirements.    Assess for micronutrient deficiencies; supplement if depleted.    Assess need and assist with meal set-up and feeding.    Adjust diet or meal schedule based on preferences and tolerance.    Minimize unnecessary dietary restrictions to increase oral intake.      Problem: Hyperglycemia  Goal: Blood Glucose Level Within Targeted Range  Description: Establish target blood glucose levels based on patient-specific factors, such as illness severity and comorbidity.    Document blood glucose levels and monitor trend; advocate for treatment if not within targeted range.    Provide pharmacologic therapy to maintain blood glucose levels within targeted range.    Advocate for insulin therapy if blood glucose level remains elevated.    Check blood glucose level if there is a change in mental or cognitive status.         Problem: Fall Injury Risk  Goal: Absence of Fall and Fall-Related Injury  Description: Provide a safe, barrier-free environment that encourages independent activity.    Keep care area uncluttered and well-lighted.    Determine need for increased observation or monitoring.    Avoid use of devices that minimize mobility, such as restraints or indwelling urinary catheter.           Problem: Infection  Goal: Absence of Infection Signs and Symptoms  Description: Implement transmission-based precautions and isolation, as indicated, to prevent spread of infection.    Obtain cultures prior to initiating antimicrobial therapy, when possible. Do not delay treatment for laboratory results in the presence of high suspicion or clinical indicators.    Administer ordered antimicrobial therapy promptly; reassess need regularly.    Monitor laboratory value, diagnostic test and clinical status trends for signs of infection progression.    Identify early signs of sepsis, such as increased heart rate and decreased blood pressure, as well as changes in mental state, respiratory  pattern or peripheral perfusion.           Problem: Pain Chronic (Persistent)  Goal: Optimal Pain Control and Function  Description: Evaluate pain level, effect of treatment and patient response at regular intervals.    Minimize pain stimuli; coordinate care and adjust environment (e.g., light, noise, unnecessary movement); promote sleep/rest.    Match pharmacologic analgesia to severity and type of pain mechanism (e.g., neuropathic, muscle, inflammatory); consider multimodal approach (e.g., nonopioid, opioid, adjuvant).    Provide medication at regular intervals; titrate to patient response.    Manage breakthrough pain with additional doses; consider rotation or switching medication.         Problem: BADL (Basic Activities of Daily Living) Impairment  Goal: Optimal Safe BADL Performance  Description: Assess BADL (basic activity of daily living) abilities; encourage participation at maximally safe independent level.    Provide assistance and supervision needed to maintain safety; involve caregiver in BADL (basic activity of daily living) training.    Ensure effective use of equipment or devices, such as a long-handled reacher, shower seat or orthosis.    Ensure proper body mechanics and positioning for optimal task performance.    Provide set-up of items if patient is unable to retrieve; store personal care items in accessible location.    Schedule BADL (basic activity of daily living) activities when pain and fatigue are at a minimum; pace activity to conserve energy.           Problem: Skin Injury Risk Increased  Goal: Skin Health and Integrity  Description: Perform a full pressure injury risk assessment, as indicated by screening, upon admission to care unit.    Reassess skin (full inspection and injury risk, including skin temperature, consistency and color) frequently (e.g., scheduled interval, with change in condition) to provide optimal early detection and prevention.    Maintain adequate tissue perfusion  (e.g., encourage fluid balance; avoid crossing legs, constrictive clothing or devices) to promote tissue oxygenation.    Maintain head of bed at lowest degree of elevation tolerated, considering medical condition and other restrictions. Use positioning supports to prevent sliding and friction. Consider low friction textiles.    Avoid positioning onto an area that remains reddened or on bony prominences.

## 2023-07-11 NOTE — PLAN OF CARE
Goal Outcome Evaluation:    FOCUS/GOAL    Bowel management, Bladder management, Medication management, Wound care management, Mobility, Skin integrity and Safety management    ASSESSMENT, INTERVENTIONS AND CONTINUING PLAN FOR GOAL:    Pt is A&OX4, calm, & cooperative with care. Denied CP, SOB, & n/v. On 2L O2 via NC at night. A of 2 with Liko lift for transfer & spent the night on a recliner. Continent for both B&B; uses BSC. Urinal at the bedside. R lower forearm PIV patent, NS locked, & dressing CDI. Takes meds whole with thin liquid. Potassium level is 3.8 & magnesium level is 1.6 per 07/10/23 lab result. AM lab redraw scheduled for 07/11. Pt slept well for most of the night & no acute issue. Able to make needs known & call light within reach. Will continue with POC.    Patient's most recent vital signs are:     Vital signs:  BP: 129/78  Temp: 97.2  HR: 104  RR: 18  SpO2: 97 %     Patient does not have new respiratory symptoms.  Patient does not have new sore throat.  Patient does not have a fever greater than 99.5.

## 2023-07-11 NOTE — TELEPHONE ENCOUNTER
Medications from visit today did not go through to pharmacy. Verified with pharmacy. Meds resent by this provider.

## 2023-07-11 NOTE — PLAN OF CARE
Goal Outcome Evaluation:    VS: BP 95/63 (BP Location: Right arm)   Pulse 92   Temp 98.1  F (36.7  C) (Oral)   Resp 18   Wt 96.3 kg (212 lb 4.9 oz)   SpO2 100%   BMI 33.25 kg/m       O2: Room air   Output: Voiding without problem   Last BM: 7/1: large soft.    Activity: Participating in therapy and visit from spouse   Skin: Preventative sacral meplex changed.    Pain: Pain from gas in AM relieved with PRN simethicone. Pt reports ABD discomfort and feeling need to have a BM all the time last few days. Relieved after BM in AM.    Neuro: A&Ox4   Dressing:    Diet: Regular ate 100%.   LDA:    Equipment:    Plan: Con't POC.    Additional Info:  IV mag replacement in AM. Time pushed back in AM to accomidate therapy. Pt's prednisone and insulin orders were changed today to manage gout. Pt discussed changes with provider. Pt had shower in afternoon and then went off floor with spouse. Afternoon metoprolol was missed due to pt being off floor.

## 2023-07-11 NOTE — PROVIDER NOTIFICATION
Notified the provider that patient is requesting for 20 mg of prednisone in addition to 5 mg that was given on am shift. Provider ordered 20 mg of prednisone per transplant's provider note.

## 2023-07-11 NOTE — PROGRESS NOTES
[facility]    Medicine Progress Note - Hospitalist Service    Date of Admission:  2023    Assessment & Plan   Damion Quinones is a 67 year old male admitted on 2023 for ongoing rehabilitation after hospitalization at [facility].    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   He underwent simultaneous liver kidney transplant on 2023. RTOR on  with concern for low flow in the renal graft - ex-lap, washout, closure with healthy appearing graft with intact arterial and venous flow. He is hospital course was also complicated by renal failure requiring CRRT and intermittent hemodialysis, RIJ clot, atrial fibrillation, hypertension, fluid overload, hypoxemia, anemia, leukocytosis, malnutrition, steroids/tube feed induced hyperglycemia, gout and deconditioning.  For details of hospitalization please refer to the discharge summary note on 2023.    Patient was admitted to TCU to complete rehabilitation program.     Expected discharge on .     Today's changes:     Dicussed with wife   Paged TXP   Reviewed clinic note 7/10     # increase pred to 30 mg and reduce to 10 mg every 4 days and leave at 10 mg   # cover increased steroid dose with NPH 6 units today followed by 6 till he is on 30 mg pred  # reduce lyrica to 25 mg BID ( has been on TID   # reduce dose of mycophenolate . after txp input      # Physical deconditioning:   - Continue working with PT and OT.      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 US liver on 7/5 due to increased transaminases. No acute findings on US, normal doppler.      Kidney transplant w/ stent, delayed graft function:   Cr 1.8, stable. 6/20 biopsy: ATN, no rejection.     Immunosuppression:  Induction: Steroid taper and Thymoglobulin 400 mg (4 mg/kg).   Maintenance:   -  mg BID  - Tacrolimus goal level 5-6 per Dr. Clifton on 7/3.         Infection prophylaxis: PJP (bactrim), viral (Valcyte x 12 weeks)     Transplant coordinator: Cindy Clay (Liver), Angelita Torres (Kidney) [phone]  Donor type: DBD  DSA at time of transplant:  Yes CW9 6/5/23  Ureteral stent: Yes  Biliary stent: No       # Acute on chronic anemia:    - Hgb stable 7-8 without any s/sx of bleeding.  - Last transfused 6/17.  - Hemoglobin level 7.3 7/8/2023     Plan:  - CBC every Monday Wednesday Friday.  - Transfuse if hemoglobin less than 7 g/dL.    # Leucocytosis   -- Most likely steroid related. No fever. Trending down.     # LE edema   -- Continue on Bumex BID. LE edema improving.      # RIJ clot:   * US 6/15 with partially occlusive inferior R internal jugular thrombus (previously partially occlusive thrombus in right axillary resolved).   * Patient is currently on heparin infusion at low intensity and warfarin.  * INR goal is 1.5-2.   Plan:  - Case discussed with pharmacy. INR trending down. Coumadin per pharmacy.      # Cardiology:  # Coronary artery disease, paroxysmal atrial fibrillation  * Patient was seen by cardiology during inpatient hospital stay.  * Patient is currently on aspirin, atorvastatin, metoprolol and warfarin.  Due to risks of bleeding and INR goal is lowered at 1.5-2     Plan:  - Continue aspirin, metoprolol, atorvastatin  - Continue warfarin.  Pharmacy dosing       # Steroid/TF induced hyperglycemia:  * HgA1c 6.1% (6/6/2023).  Plan:  - Mild hyperglycemia as below.  lantus to 15 U. Continue monitoring BG.         ## Rheumatology:  # Psoriatic arthritis: Prior to admission was on prednisone 10 mg daily.  # Gout:  see pred above      Plan:  - Continue on Prednisone as above.   -Continue allopurinol.      # Hepatitis B s Ag +: Noted to be positive pre-transplant on 6/5/23, but not previously positive (1/26/2023). HBV DNA negative on 6/5/23. No clear at risk behavior. Repeat Hep B s Ag was negative and HBV DNA not detected; suspect 6/5/2023 HBsAg positivity was a false positive.      Pneumococcal vaccination: Patient recently had solid organ transplant.  Will defer vaccination to primary transplant team          Malnutrition Diagnosis: Patient does not meet two of the established criteria necessary for diagnosing malnutrition but is at risk for malnutrition         Diet: Regular Diet Adult  Snacks/Supplements Adult: Other; Please allow pt/RN to order snacks/supplements PRN; Between Meals    DVT Prophylaxis: Warfarin  Lux Catheter: Not present  Lines: None     Cardiac Monitoring: None  Code Status: Full Code      Clinically Significant Risk Factors           # Hypercalcemia: corrected calcium is >10.1, will monitor as appropriate  # Hypomagnesemia: Lowest Mg = 1.5 mg/dL in last 2 days, will replace as needed   # Hypoalbuminemia: Lowest albumin = 2.6 g/dL at 7/7/2023 10:10 AM, will monitor as appropriate     # Hypertension: Noted on problem list         # Moderate Malnutrition: based on nutrition assessment         Disposition Plan     Expected Discharge Date: 07/27/2023                  Lashay Rachel MD  Hospitalist Service  [facility]  Securely message with [employer] (more info)  Text page via [facility] Paging/Directory   ______________________________________________________________________    Interval History   Hand off taken from Dr Weiner.  Today was the first enounter with this patient .  Seen in PT   Was standing with PT and working   Wife in room   No cp   No sob   Gout flare is in  hands and feet    Physical Exam   Vital Signs: Temp: 98.1  F (36.7  C) Temp src: Oral BP: 95/63 (92/60) Pulse: 92   Resp: 18 SpO2: 100 % O2 Device: None (Room air)    Weight: 212 lbs 4.85 oz    Alert and oriented   Cushion goid features  Dry thin skin   Chest ; clear BL  CVS ; s1 ands 2 , no murmur appreciated   Ext ; trace edema       Medical Decision Making       52 MINUTES SPENT BY ME on the date of service doing chart review, history, exam, documentation & further activities per the note.      Data     I have personally reviewed the following data over the past 24 hrs:    N/A  \   N/A   / N/A     N/A N/A N/A /  144 (H)   3.8 N/A N/A \       INR:  1.63 (H) PTT:  N/A   D-dimer:  N/A Fibrinogen:  N/A

## 2023-07-12 ENCOUNTER — APPOINTMENT (OUTPATIENT)
Dept: OCCUPATIONAL THERAPY | Facility: SKILLED NURSING FACILITY | Age: 68
End: 2023-07-12
Attending: INTERNAL MEDICINE
Payer: COMMERCIAL

## 2023-07-12 ENCOUNTER — APPOINTMENT (OUTPATIENT)
Dept: PHYSICAL THERAPY | Facility: SKILLED NURSING FACILITY | Age: 68
End: 2023-07-12
Attending: INTERNAL MEDICINE
Payer: COMMERCIAL

## 2023-07-12 LAB
ALBUMIN SERPL BCG-MCNC: 2.9 G/DL (ref 3.5–5.2)
ALP SERPL-CCNC: 414 U/L (ref 40–129)
ALT SERPL W P-5'-P-CCNC: 89 U/L (ref 0–70)
ANION GAP SERPL CALCULATED.3IONS-SCNC: 13 MMOL/L (ref 7–15)
AST SERPL W P-5'-P-CCNC: 33 U/L (ref 0–45)
BASOPHILS # BLD MANUAL: 0 10E3/UL (ref 0–0.2)
BASOPHILS NFR BLD MANUAL: 0 %
BILIRUB SERPL-MCNC: 0.5 MG/DL
BUN SERPL-MCNC: 48.3 MG/DL (ref 8–23)
CALCIUM SERPL-MCNC: 9.2 MG/DL (ref 8.8–10.2)
CHLORIDE SERPL-SCNC: 95 MMOL/L (ref 98–107)
CREAT SERPL-MCNC: 1.18 MG/DL (ref 0.67–1.17)
DACRYOCYTES BLD QL SMEAR: SLIGHT
DEPRECATED HCO3 PLAS-SCNC: 27 MMOL/L (ref 22–29)
EOSINOPHIL # BLD MANUAL: 0 10E3/UL (ref 0–0.7)
EOSINOPHIL NFR BLD MANUAL: 0 %
ERYTHROCYTE [DISTWIDTH] IN BLOOD BY AUTOMATED COUNT: 18.3 % (ref 10–15)
GFR SERPL CREATININE-BSD FRML MDRD: 68 ML/MIN/1.73M2
GLUCOSE BLDC GLUCOMTR-MCNC: 112 MG/DL (ref 70–99)
GLUCOSE BLDC GLUCOMTR-MCNC: 164 MG/DL (ref 70–99)
GLUCOSE BLDC GLUCOMTR-MCNC: 181 MG/DL (ref 70–99)
GLUCOSE BLDC GLUCOMTR-MCNC: 359 MG/DL (ref 70–99)
GLUCOSE SERPL-MCNC: 126 MG/DL (ref 70–99)
HCT VFR BLD AUTO: 24.9 % (ref 40–53)
HGB BLD-MCNC: 7.6 G/DL (ref 13.3–17.7)
INR PPP: 1.59 (ref 0.85–1.15)
LYMPHOCYTES # BLD MANUAL: 0.3 10E3/UL (ref 0.8–5.3)
LYMPHOCYTES NFR BLD MANUAL: 3 %
MAGNESIUM SERPL-MCNC: 2.2 MG/DL (ref 1.7–2.3)
MCH RBC QN AUTO: 29.6 PG (ref 26.5–33)
MCHC RBC AUTO-ENTMCNC: 30.5 G/DL (ref 31.5–36.5)
MCV RBC AUTO: 97 FL (ref 78–100)
METAMYELOCYTES # BLD MANUAL: 0.3 10E3/UL
METAMYELOCYTES NFR BLD MANUAL: 3 %
MONOCYTES # BLD MANUAL: 0.4 10E3/UL (ref 0–1.3)
MONOCYTES NFR BLD MANUAL: 4 %
MYCOPHENOLATE SERPL LC/MS/MS-MCNC: 0.73 MG/L (ref 1–3.5)
MYCOPHENOLATE-G SERPL LC/MS/MS-MCNC: 89.6 MG/L (ref 30–95)
MYELOCYTES # BLD MANUAL: 0.2 10E3/UL
MYELOCYTES NFR BLD MANUAL: 2 %
NEUTROPHILS # BLD MANUAL: 8.3 10E3/UL (ref 1.6–8.3)
NEUTROPHILS NFR BLD MANUAL: 88 %
PHOSPHATE SERPL-MCNC: 2.3 MG/DL (ref 2.5–4.5)
PLAT MORPH BLD: ABNORMAL
PLATELET # BLD AUTO: 195 10E3/UL (ref 150–450)
POLYCHROMASIA BLD QL SMEAR: SLIGHT
POTASSIUM SERPL-SCNC: 3.3 MMOL/L (ref 3.4–5.3)
POTASSIUM SERPL-SCNC: 3.6 MMOL/L (ref 3.4–5.3)
PROT SERPL-MCNC: 5.8 G/DL (ref 6.4–8.3)
RBC # BLD AUTO: 2.57 10E6/UL (ref 4.4–5.9)
RBC MORPH BLD: ABNORMAL
SODIUM SERPL-SCNC: 135 MMOL/L (ref 136–145)
TACROLIMUS BLD-MCNC: 5.3 UG/L (ref 5–15)
TME LAST DOSE: ABNORMAL H
TME LAST DOSE: ABNORMAL H
TME LAST DOSE: NORMAL H
TME LAST DOSE: NORMAL H
WBC # BLD AUTO: 9.4 10E3/UL (ref 4–11)

## 2023-07-12 PROCEDURE — 85007 BL SMEAR W/DIFF WBC COUNT: CPT | Performed by: INTERNAL MEDICINE

## 2023-07-12 PROCEDURE — 84132 ASSAY OF SERUM POTASSIUM: CPT | Performed by: INTERNAL MEDICINE

## 2023-07-12 PROCEDURE — 250N000013 HC RX MED GY IP 250 OP 250 PS 637: Performed by: INTERNAL MEDICINE

## 2023-07-12 PROCEDURE — 97530 THERAPEUTIC ACTIVITIES: CPT | Mod: GO

## 2023-07-12 PROCEDURE — 120N000009 HC R&B SNF

## 2023-07-12 PROCEDURE — 250N000012 HC RX MED GY IP 250 OP 636 PS 637: Performed by: INTERNAL MEDICINE

## 2023-07-12 PROCEDURE — 80053 COMPREHEN METABOLIC PANEL: CPT | Performed by: INTERNAL MEDICINE

## 2023-07-12 PROCEDURE — 97110 THERAPEUTIC EXERCISES: CPT | Mod: GP | Performed by: PHYSICAL THERAPIST

## 2023-07-12 PROCEDURE — 84100 ASSAY OF PHOSPHORUS: CPT | Performed by: INTERNAL MEDICINE

## 2023-07-12 PROCEDURE — 97530 THERAPEUTIC ACTIVITIES: CPT | Mod: GP | Performed by: PHYSICAL THERAPIST

## 2023-07-12 PROCEDURE — 36415 COLL VENOUS BLD VENIPUNCTURE: CPT | Performed by: INTERNAL MEDICINE

## 2023-07-12 PROCEDURE — 83735 ASSAY OF MAGNESIUM: CPT | Performed by: INTERNAL MEDICINE

## 2023-07-12 PROCEDURE — 250N000012 HC RX MED GY IP 250 OP 636 PS 637: Performed by: NURSE PRACTITIONER

## 2023-07-12 PROCEDURE — 85610 PROTHROMBIN TIME: CPT | Performed by: INTERNAL MEDICINE

## 2023-07-12 PROCEDURE — 80197 ASSAY OF TACROLIMUS: CPT | Performed by: INTERNAL MEDICINE

## 2023-07-12 PROCEDURE — 85027 COMPLETE CBC AUTOMATED: CPT | Performed by: INTERNAL MEDICINE

## 2023-07-12 RX ORDER — POTASSIUM CHLORIDE 750 MG/1
40 TABLET, EXTENDED RELEASE ORAL ONCE
Status: COMPLETED | OUTPATIENT
Start: 2023-07-12 | End: 2023-07-12

## 2023-07-12 RX ADMIN — ASPIRIN 325 MG: 325 TABLET, FILM COATED ORAL at 08:01

## 2023-07-12 RX ADMIN — POTASSIUM & SODIUM PHOSPHATES POWDER PACK 280-160-250 MG 1 PACKET: 280-160-250 PACK at 18:40

## 2023-07-12 RX ADMIN — NYSTATIN 500000 UNITS: 500000 SUSPENSION ORAL at 08:00

## 2023-07-12 RX ADMIN — INSULIN ASPART 8 UNITS: 100 INJECTION, SOLUTION INTRAVENOUS; SUBCUTANEOUS at 08:12

## 2023-07-12 RX ADMIN — ATORVASTATIN CALCIUM 20 MG: 10 TABLET, FILM COATED ORAL at 20:12

## 2023-07-12 RX ADMIN — MYCOPHENOLATE MOFETIL 750 MG: 250 CAPSULE ORAL at 17:10

## 2023-07-12 RX ADMIN — Medication 12.5 MG: at 07:36

## 2023-07-12 RX ADMIN — INSULIN GLARGINE 15 UNITS: 100 INJECTION, SOLUTION SUBCUTANEOUS at 22:50

## 2023-07-12 RX ADMIN — URSODIOL 250 MG: 250 TABLET ORAL at 20:12

## 2023-07-12 RX ADMIN — INSULIN ASPART 10 UNITS: 100 INJECTION, SOLUTION INTRAVENOUS; SUBCUTANEOUS at 18:39

## 2023-07-12 RX ADMIN — BUMETANIDE 2 MG: 1 TABLET ORAL at 17:10

## 2023-07-12 RX ADMIN — MAGNESIUM OXIDE TAB 400 MG (241.3 MG ELEMENTAL MG) 400 MG: 400 (241.3 MG) TAB at 12:47

## 2023-07-12 RX ADMIN — TACROLIMUS 2 MG: 1 CAPSULE ORAL at 08:01

## 2023-07-12 RX ADMIN — NYSTATIN 500000 UNITS: 500000 SUSPENSION ORAL at 20:12

## 2023-07-12 RX ADMIN — MAGNESIUM OXIDE TAB 400 MG (241.3 MG ELEMENTAL MG) 400 MG: 400 (241.3 MG) TAB at 20:12

## 2023-07-12 RX ADMIN — NYSTATIN 500000 UNITS: 500000 SUSPENSION ORAL at 17:10

## 2023-07-12 RX ADMIN — PREGABALIN 25 MG: 25 CAPSULE ORAL at 08:00

## 2023-07-12 RX ADMIN — NYSTATIN 500000 UNITS: 500000 SUSPENSION ORAL at 12:47

## 2023-07-12 RX ADMIN — INSULIN ASPART 2 UNITS: 100 INJECTION, SOLUTION INTRAVENOUS; SUBCUTANEOUS at 12:47

## 2023-07-12 RX ADMIN — ALLOPURINOL 100 MG: 100 TABLET ORAL at 08:00

## 2023-07-12 RX ADMIN — POTASSIUM & SODIUM PHOSPHATES POWDER PACK 280-160-250 MG 1 PACKET: 280-160-250 PACK at 13:51

## 2023-07-12 RX ADMIN — DICLOFENAC SODIUM 4 G: 10 GEL TOPICAL at 08:02

## 2023-07-12 RX ADMIN — POTASSIUM CHLORIDE 40 MEQ: 750 TABLET, EXTENDED RELEASE ORAL at 07:03

## 2023-07-12 RX ADMIN — INSULIN ASPART 9 UNITS: 100 INJECTION, SOLUTION INTRAVENOUS; SUBCUTANEOUS at 17:17

## 2023-07-12 RX ADMIN — VALGANCICLOVIR HYDROCHLORIDE 450 MG: 450 TABLET ORAL at 20:12

## 2023-07-12 RX ADMIN — URSODIOL 250 MG: 250 TABLET ORAL at 08:01

## 2023-07-12 RX ADMIN — TACROLIMUS 2 MG: 1 CAPSULE ORAL at 17:10

## 2023-07-12 RX ADMIN — Medication 12.5 MG: at 22:14

## 2023-07-12 RX ADMIN — INSULIN ASPART 9 UNITS: 100 INJECTION, SOLUTION INTRAVENOUS; SUBCUTANEOUS at 12:48

## 2023-07-12 RX ADMIN — POTASSIUM & SODIUM PHOSPHATES POWDER PACK 280-160-250 MG 1 PACKET: 280-160-250 PACK at 10:34

## 2023-07-12 RX ADMIN — HYDROCORTISONE: 1 CREAM TOPICAL at 20:16

## 2023-07-12 RX ADMIN — SULFAMETHOXAZOLE AND TRIMETHOPRIM 1 TABLET: 400; 80 TABLET ORAL at 08:01

## 2023-07-12 RX ADMIN — BUMETANIDE 2 MG: 1 TABLET ORAL at 08:01

## 2023-07-12 RX ADMIN — MYCOPHENOLATE MOFETIL 750 MG: 250 CAPSULE ORAL at 08:00

## 2023-07-12 RX ADMIN — PREGABALIN 25 MG: 25 CAPSULE ORAL at 20:12

## 2023-07-12 RX ADMIN — B-COMPLEX W/ C & FOLIC ACID TAB 1 MG 1 TABLET: 1 TAB at 12:47

## 2023-07-12 RX ADMIN — PANTOPRAZOLE SODIUM 40 MG: 40 TABLET, DELAYED RELEASE ORAL at 07:36

## 2023-07-12 RX ADMIN — WARFARIN SODIUM 1.5 MG: 3 TABLET ORAL at 18:41

## 2023-07-12 RX ADMIN — PREDNISONE 30 MG: 5 TABLET ORAL at 08:00

## 2023-07-12 ASSESSMENT — ACTIVITIES OF DAILY LIVING (ADL)
ADLS_ACUITY_SCORE: 36

## 2023-07-12 NOTE — PLAN OF CARE
Phosphorus lab results today is 2.3.  Pt is on RN managed phosphorus.Replaced oral phosphorous. Recheck phosphorus tomorrow morning. Potassium recheck is at 1200. Recheck potassium lab results is 3.6.  Pt is alert and oriented x 4. Able to make needs known to staff. Denied SOB, chest pain, N/V. A1-2 with walker and gait belt. Uses urinal at bed side. PIV on right fore arm patent and saline flushed. Pt is on carb cover insulin and sliding scale insulin. Call light with in reach continue with POC.    4991-3296: No new acute issues in this shift. Pt daughter visited in this shift . Pt went outside with his daughter. Pt eat dinner with good appetite. Applied mepelix at coccyx area for protection.Call light with in reach. Continue with POC.    Patient's most recent vital signs are:     Vital signs:  BP: 115/68  Temp: 98.1  HR: 100  RR: 18  SpO2: 99 %     Patient does not have new respiratory symptoms.  Patient does not have new sore throat.  Patient does not have a fever greater than 99.5.

## 2023-07-12 NOTE — PLAN OF CARE
Patient is alert and oriented X 4 , regular diet with carb count, pt is assist of 2 with stand by with a gait belt. Pt uses Urinal and BSC, pt had a large BM on this shift and urinal emptied and recorded.. Pt has Mg and K RN managed lab draw scheduled in the morning ,lab results back Mg at 2.2 , K 3.3. Potassium replaced and new lab draw ordered. New orders on medication, IV on lower R arm CDI. Pt is pleasant with cares, denies SOB, CP and no n/v. Can communicate needs, call light within reach, will continue with plan of care.    Noted Phosphorus is at 2.3 report given to incoming nurse to follow up sticky note created for provider     Patient's most recent vital signs are:     Vital signs:  BP: 99/71  Temp: 98.1  HR: 97  RR: 18  SpO2: 100 %     Patient does not  have new respiratory symptoms.  Patient does not  have new sore throat.  Patient does not  have a fever greater than 99.5.

## 2023-07-12 NOTE — PROGRESS NOTES
07/12/23 09   Appointment Info   Signing Clinician's Name / Credentials (OT) DERREK Nguyen/L,CBIS   Rehab Comments (OT) oT: weekly progress note, goal review/revised, doc of therapy session   OT Goals   Therapy Frequency (OT) 6 times/wk   OT: Hygiene/Grooming supervision/stand-by assist   OT: Upper Body Dressing Supervision/stand-by assist   OT: Lower Body Dressing Supervision/stand-by assist;using adaptive equipment;within precautions   OT: Upper Body Bathing Supervision/stand-by assist;within precautions;using adaptive equipment   OT: Lower Body Bathing Moderate assist;with precautions;using adaptive equipment   OT: Toilet Transfer/Toileting Minimal assist;within precautions;using adaptive equipment   OT: Meal Preparation   (discontinue goal, low priority, wife assist PRN w/ IaDLs)   OT: Home Management   (discontinue goal, low priority, wife assist PRN w/ IaDLs)   Therapeutic Activities   Therapeutic Activity Minutes (13140) 49   Treatment Detail/Skilled Intervention OT:repeated STS and SPT w/ cga to min A and no device,repeated dynamic standing activites involving unilateral UE alternating L and Rue use and marcos UE use w/ reaching while stabilizing against table w/ hips for safety and ease,stands ranged from 50 sec to 2 min each,  improvement from yesterday evidenced by longer stands, more stands and progression to marcos UE activites vs previously needing unilat ue for support on table.   OT Discharge Planning   OT Plan OT: abdom precautions,Rx: LB drg, dynamic standing, assess for AE needs at home   OT Discharge Recommendation (DC Rec) home with assist;home with home care occupational therapy   OT Brief overview of current status oT: pt making improvements in all areas of OT, pt has been dressed prior to OT session but reports completing ub drg setup, LB drg min A, th witnessed max A marcos socks/shoes, therapy focus has primarily been on functional movement needed to progress w/ adls and to decrease  burden of care to allow pt to return to home w/ wife and supports.   Total Session Time   Timed Code Treatment Minutes 49   Total Session Time (sum of timed and untimed services) 49   Post Acute Settings Only   What unit is patient on? TCU

## 2023-07-13 ENCOUNTER — APPOINTMENT (OUTPATIENT)
Dept: PHYSICAL THERAPY | Facility: SKILLED NURSING FACILITY | Age: 68
End: 2023-07-13
Attending: INTERNAL MEDICINE
Payer: COMMERCIAL

## 2023-07-13 ENCOUNTER — APPOINTMENT (OUTPATIENT)
Dept: OCCUPATIONAL THERAPY | Facility: SKILLED NURSING FACILITY | Age: 68
End: 2023-07-13
Attending: INTERNAL MEDICINE
Payer: COMMERCIAL

## 2023-07-13 LAB
GLUCOSE BLDC GLUCOMTR-MCNC: 224 MG/DL (ref 70–99)
GLUCOSE BLDC GLUCOMTR-MCNC: 243 MG/DL (ref 70–99)
GLUCOSE BLDC GLUCOMTR-MCNC: 245 MG/DL (ref 70–99)
GLUCOSE BLDC GLUCOMTR-MCNC: 95 MG/DL (ref 70–99)
HOLD SPECIMEN: NORMAL
INR PPP: 1.56 (ref 0.85–1.15)
MAGNESIUM SERPL-MCNC: 1.7 MG/DL (ref 1.7–2.3)
PHOSPHATE SERPL-MCNC: 1.8 MG/DL (ref 2.5–4.5)
PHOSPHATE SERPL-MCNC: 1.8 MG/DL (ref 2.5–4.5)
POTASSIUM SERPL-SCNC: 3.3 MMOL/L (ref 3.4–5.3)
POTASSIUM SERPL-SCNC: 3.8 MMOL/L (ref 3.4–5.3)

## 2023-07-13 PROCEDURE — 97535 SELF CARE MNGMENT TRAINING: CPT | Mod: GO

## 2023-07-13 PROCEDURE — 250N000012 HC RX MED GY IP 250 OP 636 PS 637: Performed by: NURSE PRACTITIONER

## 2023-07-13 PROCEDURE — 84100 ASSAY OF PHOSPHORUS: CPT | Performed by: INTERNAL MEDICINE

## 2023-07-13 PROCEDURE — 250N000013 HC RX MED GY IP 250 OP 250 PS 637: Performed by: INTERNAL MEDICINE

## 2023-07-13 PROCEDURE — 97530 THERAPEUTIC ACTIVITIES: CPT | Mod: GO

## 2023-07-13 PROCEDURE — 99207 PR NO BILLABLE SERVICE THIS VISIT: CPT | Performed by: INTERNAL MEDICINE

## 2023-07-13 PROCEDURE — 250N000012 HC RX MED GY IP 250 OP 636 PS 637: Performed by: INTERNAL MEDICINE

## 2023-07-13 PROCEDURE — 85610 PROTHROMBIN TIME: CPT | Performed by: INTERNAL MEDICINE

## 2023-07-13 PROCEDURE — 36415 COLL VENOUS BLD VENIPUNCTURE: CPT | Performed by: INTERNAL MEDICINE

## 2023-07-13 PROCEDURE — 84132 ASSAY OF SERUM POTASSIUM: CPT | Performed by: INTERNAL MEDICINE

## 2023-07-13 PROCEDURE — 97530 THERAPEUTIC ACTIVITIES: CPT | Mod: GP | Performed by: PHYSICAL THERAPIST

## 2023-07-13 PROCEDURE — 120N000009 HC R&B SNF

## 2023-07-13 PROCEDURE — 83735 ASSAY OF MAGNESIUM: CPT | Performed by: INTERNAL MEDICINE

## 2023-07-13 PROCEDURE — 97110 THERAPEUTIC EXERCISES: CPT | Mod: GP | Performed by: PHYSICAL THERAPIST

## 2023-07-13 RX ORDER — PREDNISONE 20 MG/1
20 TABLET ORAL DAILY
Status: COMPLETED | OUTPATIENT
Start: 2023-07-15 | End: 2023-07-18

## 2023-07-13 RX ORDER — POTASSIUM CHLORIDE 750 MG/1
40 TABLET, EXTENDED RELEASE ORAL ONCE
Status: COMPLETED | OUTPATIENT
Start: 2023-07-13 | End: 2023-07-13

## 2023-07-13 RX ADMIN — NYSTATIN 500000 UNITS: 500000 SUSPENSION ORAL at 08:26

## 2023-07-13 RX ADMIN — INSULIN ASPART 5 UNITS: 100 INJECTION, SOLUTION INTRAVENOUS; SUBCUTANEOUS at 13:04

## 2023-07-13 RX ADMIN — INSULIN ASPART 1 UNITS: 100 INJECTION, SOLUTION INTRAVENOUS; SUBCUTANEOUS at 21:23

## 2023-07-13 RX ADMIN — PREDNISONE 30 MG: 5 TABLET ORAL at 08:24

## 2023-07-13 RX ADMIN — WARFARIN SODIUM 1.5 MG: 3 TABLET ORAL at 21:22

## 2023-07-13 RX ADMIN — PANTOPRAZOLE SODIUM 40 MG: 40 TABLET, DELAYED RELEASE ORAL at 06:43

## 2023-07-13 RX ADMIN — MYCOPHENOLATE MOFETIL 750 MG: 250 CAPSULE ORAL at 19:07

## 2023-07-13 RX ADMIN — B-COMPLEX W/ C & FOLIC ACID TAB 1 MG 1 TABLET: 1 TAB at 12:32

## 2023-07-13 RX ADMIN — DICLOFENAC SODIUM 4 G: 10 GEL TOPICAL at 08:35

## 2023-07-13 RX ADMIN — Medication 12.5 MG: at 06:43

## 2023-07-13 RX ADMIN — POTASSIUM & SODIUM PHOSPHATES POWDER PACK 280-160-250 MG 1 PACKET: 280-160-250 PACK at 17:22

## 2023-07-13 RX ADMIN — POTASSIUM & SODIUM PHOSPHATES POWDER PACK 280-160-250 MG 2 PACKET: 280-160-250 PACK at 08:24

## 2023-07-13 RX ADMIN — DICLOFENAC SODIUM 4 G: 10 GEL TOPICAL at 14:33

## 2023-07-13 RX ADMIN — MAGNESIUM OXIDE TAB 400 MG (241.3 MG ELEMENTAL MG) 400 MG: 400 (241.3 MG) TAB at 21:22

## 2023-07-13 RX ADMIN — SULFAMETHOXAZOLE AND TRIMETHOPRIM 1 TABLET: 400; 80 TABLET ORAL at 08:25

## 2023-07-13 RX ADMIN — TACROLIMUS 2 MG: 1 CAPSULE ORAL at 19:07

## 2023-07-13 RX ADMIN — TACROLIMUS 2 MG: 1 CAPSULE ORAL at 08:25

## 2023-07-13 RX ADMIN — ALLOPURINOL 100 MG: 100 TABLET ORAL at 08:26

## 2023-07-13 RX ADMIN — POTASSIUM CHLORIDE 40 MEQ: 750 TABLET, EXTENDED RELEASE ORAL at 08:24

## 2023-07-13 RX ADMIN — BUMETANIDE 2 MG: 1 TABLET ORAL at 17:21

## 2023-07-13 RX ADMIN — PREGABALIN 25 MG: 25 CAPSULE ORAL at 08:25

## 2023-07-13 RX ADMIN — INSULIN ASPART 5 UNITS: 100 INJECTION, SOLUTION INTRAVENOUS; SUBCUTANEOUS at 19:02

## 2023-07-13 RX ADMIN — URSODIOL 250 MG: 250 TABLET ORAL at 21:22

## 2023-07-13 RX ADMIN — NYSTATIN 500000 UNITS: 500000 SUSPENSION ORAL at 12:32

## 2023-07-13 RX ADMIN — VALGANCICLOVIR HYDROCHLORIDE 450 MG: 450 TABLET ORAL at 21:22

## 2023-07-13 RX ADMIN — ATORVASTATIN CALCIUM 20 MG: 10 TABLET, FILM COATED ORAL at 21:22

## 2023-07-13 RX ADMIN — Medication 12.5 MG: at 14:33

## 2023-07-13 RX ADMIN — BUMETANIDE 2 MG: 1 TABLET ORAL at 08:25

## 2023-07-13 RX ADMIN — INSULIN ASPART 6 UNITS: 100 INJECTION, SOLUTION INTRAVENOUS; SUBCUTANEOUS at 19:04

## 2023-07-13 RX ADMIN — ASPIRIN 325 MG: 325 TABLET, FILM COATED ORAL at 08:25

## 2023-07-13 RX ADMIN — INSULIN GLARGINE 15 UNITS: 100 INJECTION, SOLUTION SUBCUTANEOUS at 21:23

## 2023-07-13 RX ADMIN — Medication 12.5 MG: at 21:23

## 2023-07-13 RX ADMIN — MAGNESIUM OXIDE TAB 400 MG (241.3 MG ELEMENTAL MG) 400 MG: 400 (241.3 MG) TAB at 12:32

## 2023-07-13 RX ADMIN — POTASSIUM & SODIUM PHOSPHATES POWDER PACK 280-160-250 MG 2 PACKET: 280-160-250 PACK at 12:32

## 2023-07-13 RX ADMIN — NYSTATIN 500000 UNITS: 500000 SUSPENSION ORAL at 21:22

## 2023-07-13 RX ADMIN — NYSTATIN 500000 UNITS: 500000 SUSPENSION ORAL at 19:07

## 2023-07-13 RX ADMIN — INSULIN ASPART 6 UNITS: 100 INJECTION, SOLUTION INTRAVENOUS; SUBCUTANEOUS at 08:31

## 2023-07-13 RX ADMIN — PREGABALIN 25 MG: 25 CAPSULE ORAL at 21:23

## 2023-07-13 RX ADMIN — URSODIOL 250 MG: 250 TABLET ORAL at 08:24

## 2023-07-13 RX ADMIN — MYCOPHENOLATE MOFETIL 750 MG: 250 CAPSULE ORAL at 08:25

## 2023-07-13 ASSESSMENT — ACTIVITIES OF DAILY LIVING (ADL)
ADLS_ACUITY_SCORE: 36

## 2023-07-13 NOTE — PLAN OF CARE
Goal Outcome Evaluation:    FOCUS/GOAL    Bowel management, Bladder management, Medication management, Wound care management, Mobility, Skin integrity and Safety management    ASSESSMENT, INTERVENTIONS AND CONTINUING PLAN FOR GOAL:    Pt is A&OX4, calm, & cooperative with care. Denied CP, SOB, & n/v. On 2L O2 via NC at night. A of 1-2 SPT with walker & GB for transfer. Continent for both B&B; uses BSC. LBM this shift. Urinal at the bedside. R lower forearm PIV patent, NS locked, & dressing CDI. Takes meds whole with thin liquid. Potassium level is 3.3, magnesium level is 1.7, & phosphorus level is 1.8 per 07/13/23 lab result. Relayed information to the incoming RN. Pt slept well for most of the night & no acute issue. Able to make needs known & call light within reach. Will continue with plan of care.    Patient's most recent vital signs are:     Vital signs:  BP: 115/68  Temp: 98.1  HR: 100  RR: 18  SpO2: 99 %     Patient does not have new respiratory symptoms.  Patient does not have new sore throat.  Patient does not have a fever greater than 99.5.

## 2023-07-13 NOTE — PROGRESS NOTES
Vital stable .  Last night notes reviewed .  No nursing concerns brought forward  Labs ; potassium low, to be replaced by RN  Reduce prednisone to 20 mg on sat and nph to 5 units   Spoke with transplant PA on 7/12 about IS dosing , they will put in new dosing and monitoring as needed.

## 2023-07-13 NOTE — PLAN OF CARE
"Potassium lab results form today is 3.3, phosphate results is 1.8. pt is on RN managed potassium, phosphate, and magnesium.Oral potassium  40 mEq given. Recheck potassium lab draw at 1230,results is 3.8 . First dose of Phosphate replacement given this morning. Redraw phosphate lab is tomorrow morning. Noted skin tear on L arm . Pt stated \" when my daughter took me out yesterday evening I bumped my arm against to wheel chair it bleeded little bit my daughter put banded \". Black and blue bruises on both arms. Carb coverage insulin not administered, Pt eat lunch out side facility unable to count carobs. PIV on R fore arm patent and saline flushed. Pt wife present in room and involved in cares.Call light with in reach. Continue with POC.  Patient's most recent vital signs are:     Vital signs:  BP: 118/68  Temp: 98.1  HR: 102  RR: 18  SpO2: 100 %     Patient does not have new respiratory symptoms.  Patient does not have new sore throat.  Patient does not have a fever greater than 99.5.             "

## 2023-07-13 NOTE — PROGRESS NOTES
" Status at Admission - 6/26/23 Last update - 7/6/23 Current Status 7/13/23   Bed Mobility   Min A for rolling with use of bed rail, mod A for supine to sit use of bed rails, increased time, LE and trunk support and coordination   unchanged      Transfer   Sit to stand: max A x 1, Mod A x 1 for simple standing from elevated bed with FWW, pt struggles to appropriately coordinate task    Max A x 1, Mod A x 1 for simple standing from elevated bed with FWW. Pt is unable to safely coordinate task and hold on to walker. Removed the walker and was able to coordinate partial stand pivot with same assist levels. Premature sit and repositioning needed   Pt has been performing standing frame for up to 5 min at a time, tolerating well. Pt continually shifting weight side to side and forward backward, able to unweight himself from sling.  Mod A x 2 for transfers using boaz steady   Pt has made significant progress and is able to squat pivot transfer between surfaces with SBA - min A depending on surface height and fatigue   Gait   unable   unable   Pt has progressed from ambulating in // bars with assist x 2 to walking several bouts of 30 - 50' with A x 2 and wc brought behind using front WW in hallway   Stairs   unable   unable   Pt has initiated toe tapping on 4\" step with goal of progressing to step ups   Wheelchair Propulsion   dependent   dependent   dependent         Assessment of Progress Since Last Update: Patient has demonstrated significant gains since last update, most notably the initiation of gait and ability to transfer without devices. Pt still requires assist x 2 for walking but anticipate he will be able to ambulate with A x 1 within a week.   Barriers to Progress: . Patient has pain in both feet that hurts when touched and when standing.    Proposed Solutions to Improve Barriers:  Medical team is addressing foot pain.   Justification for Continued Therapy Services: Patient has siginifcant deconditioning that was " present leading up to his transplants. . Pt is demonstrating good tolerance and excellent progression with therapy and will benefit from continued therapy to return to PLOF, reduce fall risk and reduce risk of rehospitalization.   Additional Considerations for Safe and Efficient Discharge:  will continue to assess.

## 2023-07-14 ENCOUNTER — APPOINTMENT (OUTPATIENT)
Dept: OCCUPATIONAL THERAPY | Facility: SKILLED NURSING FACILITY | Age: 68
End: 2023-07-14
Attending: INTERNAL MEDICINE
Payer: COMMERCIAL

## 2023-07-14 ENCOUNTER — APPOINTMENT (OUTPATIENT)
Dept: PHYSICAL THERAPY | Facility: SKILLED NURSING FACILITY | Age: 68
End: 2023-07-14
Attending: INTERNAL MEDICINE
Payer: COMMERCIAL

## 2023-07-14 LAB
ALBUMIN SERPL BCG-MCNC: 3.2 G/DL (ref 3.5–5.2)
ALP SERPL-CCNC: 376 U/L (ref 40–129)
ALT SERPL W P-5'-P-CCNC: 72 U/L (ref 0–70)
ANION GAP SERPL CALCULATED.3IONS-SCNC: 13 MMOL/L (ref 7–15)
AST SERPL W P-5'-P-CCNC: 32 U/L (ref 0–45)
BASOPHILS # BLD MANUAL: 0.1 10E3/UL (ref 0–0.2)
BASOPHILS NFR BLD MANUAL: 1 %
BILIRUB SERPL-MCNC: 0.4 MG/DL
BUN SERPL-MCNC: 41.3 MG/DL (ref 8–23)
CALCIUM SERPL-MCNC: 8.7 MG/DL (ref 8.8–10.2)
CHLORIDE SERPL-SCNC: 95 MMOL/L (ref 98–107)
CREAT SERPL-MCNC: 1.13 MG/DL (ref 0.67–1.17)
DEPRECATED HCO3 PLAS-SCNC: 29 MMOL/L (ref 22–29)
EOSINOPHIL # BLD MANUAL: 0.1 10E3/UL (ref 0–0.7)
EOSINOPHIL NFR BLD MANUAL: 1 %
ERYTHROCYTE [DISTWIDTH] IN BLOOD BY AUTOMATED COUNT: 18.7 % (ref 10–15)
GFR SERPL CREATININE-BSD FRML MDRD: 72 ML/MIN/1.73M2
GLUCOSE BLDC GLUCOMTR-MCNC: 166 MG/DL (ref 70–99)
GLUCOSE BLDC GLUCOMTR-MCNC: 194 MG/DL (ref 70–99)
GLUCOSE BLDC GLUCOMTR-MCNC: 259 MG/DL (ref 70–99)
GLUCOSE BLDC GLUCOMTR-MCNC: 85 MG/DL (ref 70–99)
GLUCOSE SERPL-MCNC: 90 MG/DL (ref 70–99)
HCT VFR BLD AUTO: 26.1 % (ref 40–53)
HGB BLD-MCNC: 7.9 G/DL (ref 13.3–17.7)
INR PPP: 1.43 (ref 0.85–1.15)
LYMPHOCYTES # BLD MANUAL: 0.5 10E3/UL (ref 0.8–5.3)
LYMPHOCYTES NFR BLD MANUAL: 4 %
MAGNESIUM SERPL-MCNC: 1.4 MG/DL (ref 1.7–2.3)
MAGNESIUM SERPL-MCNC: 2.2 MG/DL (ref 1.7–2.3)
MCH RBC QN AUTO: 29.8 PG (ref 26.5–33)
MCHC RBC AUTO-ENTMCNC: 30.3 G/DL (ref 31.5–36.5)
MCV RBC AUTO: 99 FL (ref 78–100)
METAMYELOCYTES # BLD MANUAL: 0.1 10E3/UL
METAMYELOCYTES NFR BLD MANUAL: 1 %
MONOCYTES # BLD MANUAL: 0.5 10E3/UL (ref 0–1.3)
MONOCYTES NFR BLD MANUAL: 4 %
MYELOCYTES # BLD MANUAL: 0.5 10E3/UL
MYELOCYTES NFR BLD MANUAL: 4 %
NEUTROPHILS # BLD MANUAL: 10.2 10E3/UL (ref 1.6–8.3)
NEUTROPHILS NFR BLD MANUAL: 85 %
PHOSPHATE SERPL-MCNC: 2.8 MG/DL (ref 2.5–4.5)
PLAT MORPH BLD: ABNORMAL
PLATELET # BLD AUTO: 199 10E3/UL (ref 150–450)
POLYCHROMASIA BLD QL SMEAR: ABNORMAL
POTASSIUM SERPL-SCNC: 3.5 MMOL/L (ref 3.4–5.3)
PROT SERPL-MCNC: 5.8 G/DL (ref 6.4–8.3)
RBC # BLD AUTO: 2.65 10E6/UL (ref 4.4–5.9)
RBC MORPH BLD: ABNORMAL
SODIUM SERPL-SCNC: 137 MMOL/L (ref 136–145)
TACROLIMUS BLD-MCNC: 4.4 UG/L (ref 5–15)
TME LAST DOSE: ABNORMAL H
TME LAST DOSE: ABNORMAL H
WBC # BLD AUTO: 12 10E3/UL (ref 4–11)

## 2023-07-14 PROCEDURE — 250N000012 HC RX MED GY IP 250 OP 636 PS 637: Performed by: NURSE PRACTITIONER

## 2023-07-14 PROCEDURE — 97110 THERAPEUTIC EXERCISES: CPT | Mod: GP | Performed by: PHYSICAL THERAPIST

## 2023-07-14 PROCEDURE — 97535 SELF CARE MNGMENT TRAINING: CPT | Mod: GO | Performed by: OCCUPATIONAL THERAPIST

## 2023-07-14 PROCEDURE — 250N000012 HC RX MED GY IP 250 OP 636 PS 637: Performed by: INTERNAL MEDICINE

## 2023-07-14 PROCEDURE — 250N000013 HC RX MED GY IP 250 OP 250 PS 637: Performed by: INTERNAL MEDICINE

## 2023-07-14 PROCEDURE — 97530 THERAPEUTIC ACTIVITIES: CPT | Mod: GO | Performed by: OCCUPATIONAL THERAPIST

## 2023-07-14 PROCEDURE — 36415 COLL VENOUS BLD VENIPUNCTURE: CPT | Performed by: INTERNAL MEDICINE

## 2023-07-14 PROCEDURE — 85610 PROTHROMBIN TIME: CPT | Performed by: INTERNAL MEDICINE

## 2023-07-14 PROCEDURE — 85027 COMPLETE CBC AUTOMATED: CPT | Performed by: INTERNAL MEDICINE

## 2023-07-14 PROCEDURE — 97530 THERAPEUTIC ACTIVITIES: CPT | Mod: GP | Performed by: PHYSICAL THERAPIST

## 2023-07-14 PROCEDURE — 80197 ASSAY OF TACROLIMUS: CPT | Performed by: INTERNAL MEDICINE

## 2023-07-14 PROCEDURE — 85007 BL SMEAR W/DIFF WBC COUNT: CPT | Performed by: INTERNAL MEDICINE

## 2023-07-14 PROCEDURE — 80053 COMPREHEN METABOLIC PANEL: CPT | Performed by: INTERNAL MEDICINE

## 2023-07-14 PROCEDURE — 83735 ASSAY OF MAGNESIUM: CPT | Performed by: INTERNAL MEDICINE

## 2023-07-14 PROCEDURE — 250N000011 HC RX IP 250 OP 636: Mod: JZ | Performed by: INTERNAL MEDICINE

## 2023-07-14 PROCEDURE — 120N000009 HC R&B SNF

## 2023-07-14 PROCEDURE — 84100 ASSAY OF PHOSPHORUS: CPT | Performed by: INTERNAL MEDICINE

## 2023-07-14 RX ORDER — WARFARIN SODIUM 2 MG/1
2 TABLET ORAL
Status: COMPLETED | OUTPATIENT
Start: 2023-07-14 | End: 2023-07-14

## 2023-07-14 RX ORDER — TACROLIMUS 1 MG/1
3 CAPSULE ORAL 2 TIMES DAILY
Status: DISCONTINUED | OUTPATIENT
Start: 2023-07-14 | End: 2023-07-27 | Stop reason: HOSPADM

## 2023-07-14 RX ORDER — PREGABALIN 25 MG/1
25 CAPSULE ORAL DAILY
Status: DISCONTINUED | OUTPATIENT
Start: 2023-07-15 | End: 2023-07-23

## 2023-07-14 RX ORDER — MYCOPHENOLIC ACID 180 MG/1
540 TABLET, DELAYED RELEASE ORAL
Status: DISCONTINUED | OUTPATIENT
Start: 2023-07-14 | End: 2023-07-27 | Stop reason: HOSPADM

## 2023-07-14 RX ORDER — MAGNESIUM SULFATE HEPTAHYDRATE 40 MG/ML
4 INJECTION, SOLUTION INTRAVENOUS ONCE
Status: COMPLETED | OUTPATIENT
Start: 2023-07-14 | End: 2023-07-14

## 2023-07-14 RX ADMIN — NYSTATIN 500000 UNITS: 500000 SUSPENSION ORAL at 20:26

## 2023-07-14 RX ADMIN — ATORVASTATIN CALCIUM 20 MG: 10 TABLET, FILM COATED ORAL at 20:26

## 2023-07-14 RX ADMIN — WARFARIN SODIUM 2 MG: 2 TABLET ORAL at 17:17

## 2023-07-14 RX ADMIN — PREDNISONE 30 MG: 5 TABLET ORAL at 08:21

## 2023-07-14 RX ADMIN — TACROLIMUS 2 MG: 1 CAPSULE ORAL at 08:21

## 2023-07-14 RX ADMIN — Medication 12.5 MG: at 21:54

## 2023-07-14 RX ADMIN — PREGABALIN 25 MG: 25 CAPSULE ORAL at 08:21

## 2023-07-14 RX ADMIN — INSULIN ASPART 4 UNITS: 100 INJECTION, SOLUTION INTRAVENOUS; SUBCUTANEOUS at 12:44

## 2023-07-14 RX ADMIN — INSULIN ASPART 4 UNITS: 100 INJECTION, SOLUTION INTRAVENOUS; SUBCUTANEOUS at 17:19

## 2023-07-14 RX ADMIN — B-COMPLEX W/ C & FOLIC ACID TAB 1 MG 1 TABLET: 1 TAB at 12:43

## 2023-07-14 RX ADMIN — BUMETANIDE 2 MG: 1 TABLET ORAL at 17:16

## 2023-07-14 RX ADMIN — MAGNESIUM OXIDE TAB 400 MG (241.3 MG ELEMENTAL MG) 400 MG: 400 (241.3 MG) TAB at 12:44

## 2023-07-14 RX ADMIN — DICLOFENAC SODIUM 4 G: 10 GEL TOPICAL at 08:26

## 2023-07-14 RX ADMIN — VALGANCICLOVIR HYDROCHLORIDE 450 MG: 450 TABLET ORAL at 20:26

## 2023-07-14 RX ADMIN — HYDROCORTISONE: 1 CREAM TOPICAL at 20:27

## 2023-07-14 RX ADMIN — NYSTATIN 500000 UNITS: 500000 SUSPENSION ORAL at 12:44

## 2023-07-14 RX ADMIN — SULFAMETHOXAZOLE AND TRIMETHOPRIM 1 TABLET: 400; 80 TABLET ORAL at 08:21

## 2023-07-14 RX ADMIN — MAGNESIUM SULFATE HEPTAHYDRATE 4 G: 40 INJECTION, SOLUTION INTRAVENOUS at 10:49

## 2023-07-14 RX ADMIN — URSODIOL 250 MG: 250 TABLET ORAL at 08:21

## 2023-07-14 RX ADMIN — DICLOFENAC SODIUM 4 G: 10 GEL TOPICAL at 20:27

## 2023-07-14 RX ADMIN — ALLOPURINOL 100 MG: 100 TABLET ORAL at 08:21

## 2023-07-14 RX ADMIN — INSULIN ASPART 5 UNITS: 100 INJECTION, SOLUTION INTRAVENOUS; SUBCUTANEOUS at 17:17

## 2023-07-14 RX ADMIN — PANTOPRAZOLE SODIUM 40 MG: 40 TABLET, DELAYED RELEASE ORAL at 05:26

## 2023-07-14 RX ADMIN — MYCOPHENOLIC ACID 540 MG: 180 TABLET, DELAYED RELEASE ORAL at 17:17

## 2023-07-14 RX ADMIN — MAGNESIUM OXIDE TAB 400 MG (241.3 MG ELEMENTAL MG) 400 MG: 400 (241.3 MG) TAB at 20:26

## 2023-07-14 RX ADMIN — POLYETHYLENE GLYCOL 3350 17 G: 17 POWDER, FOR SOLUTION ORAL at 08:21

## 2023-07-14 RX ADMIN — INSULIN ASPART 3 UNITS: 100 INJECTION, SOLUTION INTRAVENOUS; SUBCUTANEOUS at 12:44

## 2023-07-14 RX ADMIN — MYCOPHENOLATE MOFETIL 750 MG: 250 CAPSULE ORAL at 08:21

## 2023-07-14 RX ADMIN — INSULIN ASPART 8 UNITS: 100 INJECTION, SOLUTION INTRAVENOUS; SUBCUTANEOUS at 08:28

## 2023-07-14 RX ADMIN — ASPIRIN 325 MG: 325 TABLET, FILM COATED ORAL at 08:21

## 2023-07-14 RX ADMIN — TACROLIMUS 3 MG: 1 CAPSULE ORAL at 17:17

## 2023-07-14 RX ADMIN — BUMETANIDE 2 MG: 1 TABLET ORAL at 08:22

## 2023-07-14 RX ADMIN — NYSTATIN 500000 UNITS: 500000 SUSPENSION ORAL at 17:16

## 2023-07-14 RX ADMIN — NYSTATIN 500000 UNITS: 500000 SUSPENSION ORAL at 08:22

## 2023-07-14 RX ADMIN — URSODIOL 250 MG: 250 TABLET ORAL at 20:26

## 2023-07-14 ASSESSMENT — ACTIVITIES OF DAILY LIVING (ADL)
ADLS_ACUITY_SCORE: 36

## 2023-07-14 NOTE — PLAN OF CARE
Shift [id number]      VS: Vital signs:  Temp: 97.4  F (36.3  C) Temp src: Oral BP: 104/66 Pulse: 99   Resp: 16 SpO2: 96 % O2 Device: None (Room air)        O2: 96 RA. Denies sob/cp    Output: Voids via urinal    Last BM: 7/14/2023.   Activity: A-1 walker and gaitbelt    Up for meals? Yes    Skin: Bruising on upper forearm    Pain: Denied pain    Neuro / CMS: Numbness/tingling present    Diet: Regular diet    LDA: R PIV saline locked    Plan: Continue with plan of care    Additional Info:

## 2023-07-14 NOTE — PLAN OF CARE
/75 (BP Location: Right arm, Cuff Size: Adult Regular)   Pulse 101   Temp 98.1  F (36.7  C) (Oral)   Resp 18   Wt 96.3 kg (212 lb 4.9 oz)   SpO2 98%   BMI 33.25 kg/m    Oxygen @ 2L during sleep  Pt Is A & O X 4  Denies pain  Metoprolol held due to order parameters not met  Pt appeared to have slept through the night   A1 with cares   LBM 7/14  Pt is able to utilize the call button effectively, call button placed within reach. Will continue with POC

## 2023-07-14 NOTE — PROGRESS NOTES
Transplant Surgery Immunosuppression Management Note:  Damion Quinones is a 67 year old male with history of cirrhosis (alcohol + hemochromatosis), ESKD (IgA nephropathy + ANCA vasculitis), PAF, obesity, HTN, pre-diabetes, rheumatoid and psoriatic arthritis, and CAD. S/p simultaneous liver and kidney transplant on 6/6/2023 complicated by DGF, AF RVR, shock, gout flare, right internal jugular clot, and malnutrition.      POD # 38    Liver transplant w/ complex reconstruction of accessory right hepatic artery: No biliary stent. Last liver US 7/5 with normal doppler. LFTs trending down.     Kidney transplant w/ stent, delayed graft function: 6/20 biopsy: ATN, no rejection. Cr 1.8->1.4->1.2->1.1, improving.      Immunosuppression:  Induction: Steroid taper and Thymoglobulin 400 mg (4 mg/kg).   Maintenance:    -  mg BID -> Myfortic 540 mg BID d/t abdominal discomfort; can be decreased to 360 mg BID if symptoms persist.    - Tacrolimus 3 mg BID. Goal level 5-6 per Dr. Clifton on 7/3.    - Prednisone pulse d/t gout flare with taper to 10 mg daily.   Infection prophylaxis: PJP (Bactrim), viral (Valcyte x 12 weeks)    Transplant coordinator: Cindy Clay (Liver), Angelita Torres (Kidney) [phone]  Donor type: DBD  DSA at time of transplant:  Yes CW9 6/5/23  Ureteral stent: Yes  Biliary stent: No    Lizbeth Greer NP   Liver Transplant Surgery [facility] [phone]

## 2023-07-14 NOTE — PLAN OF CARE
Vital stable .  Last night notes reviewed .  No nursing concerns brought forward  Labs ; none reported thus far today.  Continue prior treatment plan.    IS per txt team  Reduce lantus to 13 units ( from 15 ) . Am BG on lowers side.  Reduce lyrica to 25 mg from tomorrow ( as discussed with wife and patient on 7/11 )

## 2023-07-14 NOTE — PLAN OF CARE
Magnesium lab results form today is 1.4. Pt is on RN managed potassium, phosphorus and magnesium.Replaced IV magnesium and redraw magnesium lab at 1600. Pt is alert and oriented x 4. Able to make needs known to staff. A /1 with walker and gait belt. Uses urinal at bed side. PIV on right fore arm patent and dressing CDI. Pt eat lunch and breakfast with good appetite. BG's was 85,and 194. Carb coverage and sliding scale insuline given. Pt wife at bed side and involved in cares. Call light with in reach.Continue with POC.     Patient's most recent vital signs are:     Vital signs:  BP: 98/66  Temp: 97.6  HR: 89  RR: 17  SpO2: 99 %     Patient does not have new respiratory symptoms.  Patient does not have new sore throat.  Patient does not have a fever greater than 99.5.

## 2023-07-15 ENCOUNTER — APPOINTMENT (OUTPATIENT)
Dept: OCCUPATIONAL THERAPY | Facility: SKILLED NURSING FACILITY | Age: 68
End: 2023-07-15
Attending: INTERNAL MEDICINE
Payer: COMMERCIAL

## 2023-07-15 LAB
GLUCOSE BLDC GLUCOMTR-MCNC: 105 MG/DL (ref 70–99)
GLUCOSE BLDC GLUCOMTR-MCNC: 149 MG/DL (ref 70–99)
GLUCOSE BLDC GLUCOMTR-MCNC: 251 MG/DL (ref 70–99)
GLUCOSE BLDC GLUCOMTR-MCNC: 80 MG/DL (ref 70–99)
INR PPP: 1.42 (ref 0.85–1.15)
MAGNESIUM SERPL-MCNC: 1.8 MG/DL (ref 1.7–2.3)
PHOSPHATE SERPL-MCNC: 1.9 MG/DL (ref 2.5–4.5)
PHOSPHATE SERPL-MCNC: 1.9 MG/DL (ref 2.5–4.5)
POTASSIUM SERPL-SCNC: 3.2 MMOL/L (ref 3.4–5.3)
POTASSIUM SERPL-SCNC: 4.3 MMOL/L (ref 3.4–5.3)

## 2023-07-15 PROCEDURE — 84100 ASSAY OF PHOSPHORUS: CPT | Performed by: INTERNAL MEDICINE

## 2023-07-15 PROCEDURE — 84132 ASSAY OF SERUM POTASSIUM: CPT | Performed by: INTERNAL MEDICINE

## 2023-07-15 PROCEDURE — 250N000013 HC RX MED GY IP 250 OP 250 PS 637: Performed by: INTERNAL MEDICINE

## 2023-07-15 PROCEDURE — 97535 SELF CARE MNGMENT TRAINING: CPT | Mod: GO

## 2023-07-15 PROCEDURE — 250N000012 HC RX MED GY IP 250 OP 636 PS 637: Performed by: NURSE PRACTITIONER

## 2023-07-15 PROCEDURE — 83735 ASSAY OF MAGNESIUM: CPT | Performed by: INTERNAL MEDICINE

## 2023-07-15 PROCEDURE — 36415 COLL VENOUS BLD VENIPUNCTURE: CPT | Performed by: INTERNAL MEDICINE

## 2023-07-15 PROCEDURE — 120N000009 HC R&B SNF

## 2023-07-15 PROCEDURE — 99207 PR NO BILLABLE SERVICE THIS VISIT: CPT | Performed by: INTERNAL MEDICINE

## 2023-07-15 PROCEDURE — 85610 PROTHROMBIN TIME: CPT | Performed by: INTERNAL MEDICINE

## 2023-07-15 PROCEDURE — 250N000012 HC RX MED GY IP 250 OP 636 PS 637: Performed by: INTERNAL MEDICINE

## 2023-07-15 RX ORDER — WARFARIN SODIUM 2 MG/1
2 TABLET ORAL
Status: COMPLETED | OUTPATIENT
Start: 2023-07-15 | End: 2023-07-15

## 2023-07-15 RX ORDER — POTASSIUM CHLORIDE 750 MG/1
40 TABLET, EXTENDED RELEASE ORAL ONCE
Status: COMPLETED | OUTPATIENT
Start: 2023-07-15 | End: 2023-07-15

## 2023-07-15 RX ADMIN — Medication 12.5 MG: at 06:14

## 2023-07-15 RX ADMIN — POTASSIUM & SODIUM PHOSPHATES POWDER PACK 280-160-250 MG 2 PACKET: 280-160-250 PACK at 21:58

## 2023-07-15 RX ADMIN — MAGNESIUM OXIDE TAB 400 MG (241.3 MG ELEMENTAL MG) 400 MG: 400 (241.3 MG) TAB at 12:24

## 2023-07-15 RX ADMIN — INSULIN ASPART 5 UNITS: 100 INJECTION, SOLUTION INTRAVENOUS; SUBCUTANEOUS at 18:17

## 2023-07-15 RX ADMIN — INSULIN ASPART 6 UNITS: 100 INJECTION, SOLUTION INTRAVENOUS; SUBCUTANEOUS at 08:46

## 2023-07-15 RX ADMIN — ALLOPURINOL 100 MG: 100 TABLET ORAL at 08:45

## 2023-07-15 RX ADMIN — MAGNESIUM OXIDE TAB 400 MG (241.3 MG ELEMENTAL MG) 400 MG: 400 (241.3 MG) TAB at 21:57

## 2023-07-15 RX ADMIN — INSULIN HUMAN 5 UNITS: 100 INJECTION, SUSPENSION SUBCUTANEOUS at 08:46

## 2023-07-15 RX ADMIN — NYSTATIN 500000 UNITS: 500000 SUSPENSION ORAL at 08:45

## 2023-07-15 RX ADMIN — VALGANCICLOVIR HYDROCHLORIDE 450 MG: 450 TABLET ORAL at 21:58

## 2023-07-15 RX ADMIN — SULFAMETHOXAZOLE AND TRIMETHOPRIM 1 TABLET: 400; 80 TABLET ORAL at 08:45

## 2023-07-15 RX ADMIN — TACROLIMUS 3 MG: 1 CAPSULE ORAL at 18:20

## 2023-07-15 RX ADMIN — TACROLIMUS 3 MG: 1 CAPSULE ORAL at 08:45

## 2023-07-15 RX ADMIN — PREGABALIN 25 MG: 25 CAPSULE ORAL at 08:45

## 2023-07-15 RX ADMIN — Medication 10 MG: at 21:57

## 2023-07-15 RX ADMIN — POTASSIUM & SODIUM PHOSPHATES POWDER PACK 280-160-250 MG 2 PACKET: 280-160-250 PACK at 16:50

## 2023-07-15 RX ADMIN — B-COMPLEX W/ C & FOLIC ACID TAB 1 MG 1 TABLET: 1 TAB at 12:24

## 2023-07-15 RX ADMIN — MYCOPHENOLIC ACID 540 MG: 180 TABLET, DELAYED RELEASE ORAL at 18:27

## 2023-07-15 RX ADMIN — URSODIOL 250 MG: 250 TABLET ORAL at 21:58

## 2023-07-15 RX ADMIN — NYSTATIN 500000 UNITS: 500000 SUSPENSION ORAL at 21:58

## 2023-07-15 RX ADMIN — Medication 12.5 MG: at 21:57

## 2023-07-15 RX ADMIN — URSODIOL 250 MG: 250 TABLET ORAL at 08:46

## 2023-07-15 RX ADMIN — POTASSIUM & SODIUM PHOSPHATES POWDER PACK 280-160-250 MG 2 PACKET: 280-160-250 PACK at 12:24

## 2023-07-15 RX ADMIN — PANTOPRAZOLE SODIUM 40 MG: 40 TABLET, DELAYED RELEASE ORAL at 06:12

## 2023-07-15 RX ADMIN — MYCOPHENOLIC ACID 540 MG: 180 TABLET, DELAYED RELEASE ORAL at 08:45

## 2023-07-15 RX ADMIN — POTASSIUM CHLORIDE 40 MEQ: 750 TABLET, EXTENDED RELEASE ORAL at 08:44

## 2023-07-15 RX ADMIN — ATORVASTATIN CALCIUM 20 MG: 10 TABLET, FILM COATED ORAL at 21:57

## 2023-07-15 RX ADMIN — PREDNISONE 20 MG: 20 TABLET ORAL at 08:45

## 2023-07-15 RX ADMIN — ASPIRIN 325 MG: 325 TABLET, FILM COATED ORAL at 08:45

## 2023-07-15 RX ADMIN — POTASSIUM & SODIUM PHOSPHATES POWDER PACK 280-160-250 MG 2 PACKET: 280-160-250 PACK at 08:44

## 2023-07-15 RX ADMIN — NYSTATIN 500000 UNITS: 500000 SUSPENSION ORAL at 12:24

## 2023-07-15 RX ADMIN — INSULIN ASPART 6 UNITS: 100 INJECTION, SOLUTION INTRAVENOUS; SUBCUTANEOUS at 12:24

## 2023-07-15 RX ADMIN — NYSTATIN 500000 UNITS: 500000 SUSPENSION ORAL at 16:45

## 2023-07-15 RX ADMIN — BUMETANIDE 2 MG: 1 TABLET ORAL at 16:44

## 2023-07-15 RX ADMIN — WARFARIN SODIUM 2 MG: 2 TABLET ORAL at 18:20

## 2023-07-15 RX ADMIN — INSULIN ASPART 5 UNITS: 100 INJECTION, SOLUTION INTRAVENOUS; SUBCUTANEOUS at 19:04

## 2023-07-15 ASSESSMENT — ACTIVITIES OF DAILY LIVING (ADL)
ADLS_ACUITY_SCORE: 36

## 2023-07-15 NOTE — PLAN OF CARE
Patient is alert and oriented x4. Able to make needs known to staff. Patient is continent of both bowel and bladder. Voids spontaneously without difficulty. Transfers with stand-by assist walker and gait belt. Patient denied Chest pain, SOB, new onset cough and N&V. Diet: Regular diet with good appetite. PIV to RUE is intact, dressing dry, and saline locked. Protective mepilex to right elbow and sacral area are CDI. Potassium and phosphorus replaced this shift. Awaiting recheck lab results. BGs were 80 and 105 w/ carb coverage and NPH. Call-light within reach. Continue with POC.    Vital signs: T: 97.8,  B/P: 98/58,  P: 89,  R: 17,  SpO2: 98 %     Patient does not have new respiratory symptoms.  Patient does not have new sore throat.  Patient does not have a fever greater than 99.5.

## 2023-07-15 NOTE — PLAN OF CARE
Goal Outcome Evaluation:    FOCUS/GOAL    Bowel management, Bladder management, Medication management, Wound care management, Mobility, Skin integrity and Safety management    ASSESSMENT, INTERVENTIONS AND CONTINUING PLAN FOR GOAL:    Pt is A&OX4, calm, & cooperative with care. Denied CP, SOB, & n/v. On 2L O2 via NC at night. A of 1 with walker & GB for transfer. Continent for both B&B; uses BSC. LBM this shift. Urinal at the bedside. R lower forearm PIV patent, NS locked, & dressing CDI. Takes meds whole with thin liquid. Potassium level is 3.5, magnesium level is 2.2, & phosphorus level is 2.8 per 07/14/23 lab result. AM lab draw schedule for all RN managed replacements. Pt slept well for most of the night & no acute issue. Able to make needs known & call light within reach. Will continue with plan of care.    Patient's most recent vital signs are:     Vital signs:  BP: 116/67  Temp: 97.1  HR: 104  RR: 18  SpO2: 98 %     Patient does not have new respiratory symptoms.  Patient does not have new sore throat.  Patient does not have a fever greater than 99.5.

## 2023-07-15 NOTE — PROGRESS NOTES
Vital stable .  Last night notes reviewed .  No nursing concerns brought forward  Labs ; potassium and phosphorus noted . To be replaced by RN  Reduce lantus as am insulin lower side  Continue rehab

## 2023-07-15 NOTE — PLAN OF CARE
/81 (BP Location: Right arm, Patient Position: Sitting, Cuff Size: Adult Regular)   Pulse 91   Temp 97.1  F (36.2  C) (Oral)   Resp 18   Wt 96.3 kg (212 lb 3.2 oz)   SpO2 98%   BMI 33.24 kg/m      Patient alert and oriented per baseline. Denied pain or discomfort this shift. X1 assist with walker and gait belt for transfer. No chest pain or SOB voiced. Tolerated all scheduled medication this shift. No acute distress noted this shift. Call light within reach. Continue with plan of care.

## 2023-07-16 ENCOUNTER — APPOINTMENT (OUTPATIENT)
Dept: PHYSICAL THERAPY | Facility: SKILLED NURSING FACILITY | Age: 68
End: 2023-07-16
Attending: INTERNAL MEDICINE
Payer: COMMERCIAL

## 2023-07-16 LAB
GLUCOSE BLDC GLUCOMTR-MCNC: 114 MG/DL (ref 70–99)
GLUCOSE BLDC GLUCOMTR-MCNC: 171 MG/DL (ref 70–99)
GLUCOSE BLDC GLUCOMTR-MCNC: 89 MG/DL (ref 70–99)
HOLD SPECIMEN: NORMAL
INR PPP: 1.41 (ref 0.85–1.15)
MAGNESIUM SERPL-MCNC: 2 MG/DL (ref 1.7–2.3)
PHOSPHATE SERPL-MCNC: 2.8 MG/DL (ref 2.5–4.5)
POTASSIUM SERPL-SCNC: 3.4 MMOL/L (ref 3.4–5.3)
POTASSIUM SERPL-SCNC: 4.6 MMOL/L (ref 3.4–5.3)

## 2023-07-16 PROCEDURE — 97110 THERAPEUTIC EXERCISES: CPT | Mod: GP | Performed by: PHYSICAL THERAPIST

## 2023-07-16 PROCEDURE — 250N000013 HC RX MED GY IP 250 OP 250 PS 637: Performed by: INTERNAL MEDICINE

## 2023-07-16 PROCEDURE — 84100 ASSAY OF PHOSPHORUS: CPT | Performed by: INTERNAL MEDICINE

## 2023-07-16 PROCEDURE — 36415 COLL VENOUS BLD VENIPUNCTURE: CPT | Performed by: INTERNAL MEDICINE

## 2023-07-16 PROCEDURE — 250N000012 HC RX MED GY IP 250 OP 636 PS 637: Performed by: NURSE PRACTITIONER

## 2023-07-16 PROCEDURE — 84132 ASSAY OF SERUM POTASSIUM: CPT | Performed by: INTERNAL MEDICINE

## 2023-07-16 PROCEDURE — 97530 THERAPEUTIC ACTIVITIES: CPT | Mod: GP | Performed by: PHYSICAL THERAPIST

## 2023-07-16 PROCEDURE — 120N000009 HC R&B SNF

## 2023-07-16 PROCEDURE — 83735 ASSAY OF MAGNESIUM: CPT | Performed by: INTERNAL MEDICINE

## 2023-07-16 PROCEDURE — 85610 PROTHROMBIN TIME: CPT | Performed by: INTERNAL MEDICINE

## 2023-07-16 PROCEDURE — 250N000012 HC RX MED GY IP 250 OP 636 PS 637: Performed by: INTERNAL MEDICINE

## 2023-07-16 RX ORDER — POTASSIUM CHLORIDE 750 MG/1
40 TABLET, EXTENDED RELEASE ORAL ONCE
Status: COMPLETED | OUTPATIENT
Start: 2023-07-16 | End: 2023-07-16

## 2023-07-16 RX ORDER — WARFARIN SODIUM 2 MG/1
2 TABLET ORAL
Status: COMPLETED | OUTPATIENT
Start: 2023-07-16 | End: 2023-07-16

## 2023-07-16 RX ORDER — PREDNISONE 5 MG/1
10 TABLET ORAL DAILY
Status: DISCONTINUED | OUTPATIENT
Start: 2023-07-19 | End: 2023-07-27 | Stop reason: HOSPADM

## 2023-07-16 RX ADMIN — POTASSIUM CHLORIDE 40 MEQ: 750 TABLET, EXTENDED RELEASE ORAL at 12:46

## 2023-07-16 RX ADMIN — URSODIOL 250 MG: 250 TABLET ORAL at 08:46

## 2023-07-16 RX ADMIN — TACROLIMUS 3 MG: 1 CAPSULE ORAL at 08:46

## 2023-07-16 RX ADMIN — ALLOPURINOL 100 MG: 100 TABLET ORAL at 08:45

## 2023-07-16 RX ADMIN — DICLOFENAC SODIUM 4 G: 10 GEL TOPICAL at 21:35

## 2023-07-16 RX ADMIN — VALGANCICLOVIR HYDROCHLORIDE 450 MG: 450 TABLET ORAL at 21:34

## 2023-07-16 RX ADMIN — POTASSIUM & SODIUM PHOSPHATES POWDER PACK 280-160-250 MG 2 PACKET: 280-160-250 PACK at 05:56

## 2023-07-16 RX ADMIN — WARFARIN SODIUM 2 MG: 2 TABLET ORAL at 16:43

## 2023-07-16 RX ADMIN — NYSTATIN 500000 UNITS: 500000 SUSPENSION ORAL at 21:35

## 2023-07-16 RX ADMIN — B-COMPLEX W/ C & FOLIC ACID TAB 1 MG 1 TABLET: 1 TAB at 12:46

## 2023-07-16 RX ADMIN — NYSTATIN 500000 UNITS: 500000 SUSPENSION ORAL at 08:45

## 2023-07-16 RX ADMIN — NYSTATIN 500000 UNITS: 500000 SUSPENSION ORAL at 16:47

## 2023-07-16 RX ADMIN — TACROLIMUS 3 MG: 1 CAPSULE ORAL at 16:44

## 2023-07-16 RX ADMIN — NYSTATIN 500000 UNITS: 500000 SUSPENSION ORAL at 12:46

## 2023-07-16 RX ADMIN — Medication 12.5 MG: at 05:57

## 2023-07-16 RX ADMIN — POTASSIUM & SODIUM PHOSPHATES POWDER PACK 280-160-250 MG 2 PACKET: 280-160-250 PACK at 01:52

## 2023-07-16 RX ADMIN — MAGNESIUM OXIDE TAB 400 MG (241.3 MG ELEMENTAL MG) 400 MG: 400 (241.3 MG) TAB at 21:34

## 2023-07-16 RX ADMIN — SULFAMETHOXAZOLE AND TRIMETHOPRIM 1 TABLET: 400; 80 TABLET ORAL at 08:45

## 2023-07-16 RX ADMIN — PANTOPRAZOLE SODIUM 40 MG: 40 TABLET, DELAYED RELEASE ORAL at 05:56

## 2023-07-16 RX ADMIN — PREDNISONE 20 MG: 20 TABLET ORAL at 08:45

## 2023-07-16 RX ADMIN — MYCOPHENOLIC ACID 540 MG: 180 TABLET, DELAYED RELEASE ORAL at 16:44

## 2023-07-16 RX ADMIN — ATORVASTATIN CALCIUM 20 MG: 10 TABLET, FILM COATED ORAL at 21:34

## 2023-07-16 RX ADMIN — MYCOPHENOLIC ACID 540 MG: 180 TABLET, DELAYED RELEASE ORAL at 08:45

## 2023-07-16 RX ADMIN — BUMETANIDE 2 MG: 1 TABLET ORAL at 16:43

## 2023-07-16 RX ADMIN — URSODIOL 250 MG: 250 TABLET ORAL at 21:35

## 2023-07-16 RX ADMIN — ASPIRIN 325 MG: 325 TABLET, FILM COATED ORAL at 08:45

## 2023-07-16 RX ADMIN — Medication 12.5 MG: at 21:34

## 2023-07-16 RX ADMIN — INSULIN HUMAN 5 UNITS: 100 INJECTION, SUSPENSION SUBCUTANEOUS at 08:46

## 2023-07-16 RX ADMIN — INSULIN ASPART 10 UNITS: 100 INJECTION, SOLUTION INTRAVENOUS; SUBCUTANEOUS at 08:46

## 2023-07-16 RX ADMIN — INSULIN ASPART 9 UNITS: 100 INJECTION, SOLUTION INTRAVENOUS; SUBCUTANEOUS at 16:55

## 2023-07-16 RX ADMIN — MAGNESIUM OXIDE TAB 400 MG (241.3 MG ELEMENTAL MG) 400 MG: 400 (241.3 MG) TAB at 12:46

## 2023-07-16 RX ADMIN — PREGABALIN 25 MG: 25 CAPSULE ORAL at 08:46

## 2023-07-16 ASSESSMENT — ACTIVITIES OF DAILY LIVING (ADL)
ADLS_ACUITY_SCORE: 33
ADLS_ACUITY_SCORE: 36
ADLS_ACUITY_SCORE: 33
ADLS_ACUITY_SCORE: 36
ADLS_ACUITY_SCORE: 33
ADLS_ACUITY_SCORE: 33
ADLS_ACUITY_SCORE: 36
ADLS_ACUITY_SCORE: 33
ADLS_ACUITY_SCORE: 36
ADLS_ACUITY_SCORE: 37

## 2023-07-16 NOTE — PROGRESS NOTES
Vital stable .  Last night notes reviewed .  No nursing concerns brought forward  Labs ; none reported thus far today.  Continue prior treatment plan.    BG noted   Reduce Lantus to 8 units   Start prednisone 10 mg from 7/19 ( no NPH ordered currently )

## 2023-07-16 NOTE — PLAN OF CARE
Patient is alert and oriented x4. Able to make needs known to staff. Patient is continent of both bowel and bladder. Voids spontaneously without difficulty. Transfers with assist-2 w/ walker and gait belt. Patient denied Chest pain, SOB, new onset cough and N&V. Diet: Regular diet with good appetite. PIV to RUE is intact, dressing dry, and saline locked. Protective mepilex to right elbow is CDI. Phosphorus, and magnesium are all WNL. Potassium replaced this shift. Recheck later this evening. Rechecks to be done with CMPs tomorrow. BGs were 89 and 114 w/ carb coverage and NPH. Call-light within reach. Continue with POC.    Vital signs: T: 98.1,  B/P: 93/55,  P: 87,  R: 17,  SpO2: 98 %     Patient does not have new respiratory symptoms.  Patient does not have new sore throat.  Patient does not have a fever greater than 99.5.

## 2023-07-16 NOTE — PLAN OF CARE
Goal Outcome Evaluation:       Pt. Is alert and oriented x4. Able to make needs known. Patient is continent of both bowel and bladder. Assist of x1 with gait belt. Reg diet and good appetite.R lower forearm PIV patent and NS locked.  Takes meds whole with thin liquid. Pt. Requested melatonin prn for insomnia and provider paged and order put in.  Call light within reach and will continue with plan of care.            Patient's most recent vital signs are:     Vital signs:  BP: 116/71  Temp: 97.8  HR: 111  RR: 18  SpO2: 99 %     Patient does not have new respiratory symptoms.  Patient does not have new sore throat.  Patient does not have a fever greater than 99.5.

## 2023-07-16 NOTE — PLAN OF CARE
Goal Outcome Evaluation:               Patient alert and oriented, able to make needs known  Slept fairly overnight, awaken in between urinal used and medication administration  No complained of pain, headache, chest pain, N&V, no SOB  Continent of Bladder and Bowel, utilized urinal independently , voiding well, had medium BM this shift  Safety rounding checked completed, 3 side rails UP, bedside table/urinal/call light within reach  Continue with POC

## 2023-07-16 NOTE — PLAN OF CARE
Goal Outcome Evaluation:       Phosphorus replaced this shift. Lab back @ 1.9. New order for replacement added and first dose administered. Provider paged for melatonin order. Medication administered PRN. No acute issue at this time. Will continue with POC.       Patient's most recent vital signs are:     Vital signs:  BP: 116/71  Temp: 97.8  HR: 111  RR: 18  SpO2: 99 %     Patient does not have new respiratory symptoms.  Patient does not have new sore throat.  Patient does not have a fever greater than 99.5.

## 2023-07-16 NOTE — PLAN OF CARE
Goal Outcome Evaluation:      Plan of Care Reviewed With: patient    Overall Patient Progress: improving     Pt A&Ox4, denies SOB, CP, nausea, vomiting, constipation, diarrhea, and pain. Pt Ax1 walker and GB, but usually stand pivot transfer. RN managed K+, magnesium, phosphorus redraw tomorrow 0600. R PIV forearm SL. Pt has many scabs, old bruises, and small wounds. Mepelex on sacrum, R elbow. Pt wife in room, removed bandages before pt took shower. Pt showered this shift. Pt sliding scale insulin missed, pt ate early dinner out. Carb coverage based on pt/family report of CHO consumed. Continue to monitor.

## 2023-07-17 ENCOUNTER — APPOINTMENT (OUTPATIENT)
Dept: PHYSICAL THERAPY | Facility: SKILLED NURSING FACILITY | Age: 68
End: 2023-07-17
Attending: INTERNAL MEDICINE
Payer: COMMERCIAL

## 2023-07-17 ENCOUNTER — APPOINTMENT (OUTPATIENT)
Dept: OCCUPATIONAL THERAPY | Facility: SKILLED NURSING FACILITY | Age: 68
End: 2023-07-17
Attending: INTERNAL MEDICINE
Payer: COMMERCIAL

## 2023-07-17 LAB
ALBUMIN SERPL BCG-MCNC: 3 G/DL (ref 3.5–5.2)
ALP SERPL-CCNC: 269 U/L (ref 40–129)
ALT SERPL W P-5'-P-CCNC: 48 U/L (ref 0–70)
ANION GAP SERPL CALCULATED.3IONS-SCNC: 12 MMOL/L (ref 7–15)
AST SERPL W P-5'-P-CCNC: 19 U/L (ref 0–45)
BASOPHILS # BLD MANUAL: 0 10E3/UL (ref 0–0.2)
BASOPHILS NFR BLD MANUAL: 0 %
BILIRUB SERPL-MCNC: 0.4 MG/DL
BUN SERPL-MCNC: 30.1 MG/DL (ref 8–23)
CALCIUM SERPL-MCNC: 8.6 MG/DL (ref 8.8–10.2)
CHLORIDE SERPL-SCNC: 98 MMOL/L (ref 98–107)
CREAT SERPL-MCNC: 1.06 MG/DL (ref 0.67–1.17)
DEPRECATED HCO3 PLAS-SCNC: 30 MMOL/L (ref 22–29)
EOSINOPHIL # BLD MANUAL: 0.4 10E3/UL (ref 0–0.7)
EOSINOPHIL NFR BLD MANUAL: 5 %
ERYTHROCYTE [DISTWIDTH] IN BLOOD BY AUTOMATED COUNT: 18.8 % (ref 10–15)
GFR SERPL CREATININE-BSD FRML MDRD: 78 ML/MIN/1.73M2
GLUCOSE BLDC GLUCOMTR-MCNC: 134 MG/DL (ref 70–99)
GLUCOSE BLDC GLUCOMTR-MCNC: 198 MG/DL (ref 70–99)
GLUCOSE BLDC GLUCOMTR-MCNC: 217 MG/DL (ref 70–99)
GLUCOSE BLDC GLUCOMTR-MCNC: 98 MG/DL (ref 70–99)
GLUCOSE SERPL-MCNC: 98 MG/DL (ref 70–99)
HCT VFR BLD AUTO: 24.6 % (ref 40–53)
HGB BLD-MCNC: 7.4 G/DL (ref 13.3–17.7)
INR PPP: 1.47 (ref 0.85–1.15)
LYMPHOCYTES # BLD MANUAL: 0.3 10E3/UL (ref 0.8–5.3)
LYMPHOCYTES NFR BLD MANUAL: 4 %
MAGNESIUM SERPL-MCNC: 1.5 MG/DL (ref 1.7–2.3)
MAGNESIUM SERPL-MCNC: 2.4 MG/DL (ref 1.7–2.3)
MCH RBC QN AUTO: 29.2 PG (ref 26.5–33)
MCHC RBC AUTO-ENTMCNC: 30.1 G/DL (ref 31.5–36.5)
MCV RBC AUTO: 97 FL (ref 78–100)
METAMYELOCYTES # BLD MANUAL: 0.2 10E3/UL
METAMYELOCYTES NFR BLD MANUAL: 2 %
MONOCYTES # BLD MANUAL: 0.3 10E3/UL (ref 0–1.3)
MONOCYTES NFR BLD MANUAL: 4 %
MYELOCYTES # BLD MANUAL: 0.2 10E3/UL
MYELOCYTES NFR BLD MANUAL: 2 %
NEUTROPHILS # BLD MANUAL: 6.8 10E3/UL (ref 1.6–8.3)
NEUTROPHILS NFR BLD MANUAL: 83 %
PHOSPHATE SERPL-MCNC: 2.8 MG/DL (ref 2.5–4.5)
PLAT MORPH BLD: ABNORMAL
PLATELET # BLD AUTO: 172 10E3/UL (ref 150–450)
POLYCHROMASIA BLD QL SMEAR: ABNORMAL
POTASSIUM SERPL-SCNC: 3.2 MMOL/L (ref 3.4–5.3)
POTASSIUM SERPL-SCNC: 3.2 MMOL/L (ref 3.4–5.3)
POTASSIUM SERPL-SCNC: 4 MMOL/L (ref 3.4–5.3)
PROT SERPL-MCNC: 5.2 G/DL (ref 6.4–8.3)
RBC # BLD AUTO: 2.53 10E6/UL (ref 4.4–5.9)
RBC MORPH BLD: ABNORMAL
SODIUM SERPL-SCNC: 140 MMOL/L (ref 136–145)
TACROLIMUS BLD-MCNC: 5.6 UG/L (ref 5–15)
TME LAST DOSE: NORMAL H
TME LAST DOSE: NORMAL H
WBC # BLD AUTO: 8.2 10E3/UL (ref 4–11)

## 2023-07-17 PROCEDURE — 250N000013 HC RX MED GY IP 250 OP 250 PS 637

## 2023-07-17 PROCEDURE — 250N000013 HC RX MED GY IP 250 OP 250 PS 637: Performed by: INTERNAL MEDICINE

## 2023-07-17 PROCEDURE — 85007 BL SMEAR W/DIFF WBC COUNT: CPT | Performed by: INTERNAL MEDICINE

## 2023-07-17 PROCEDURE — 84132 ASSAY OF SERUM POTASSIUM: CPT | Performed by: INTERNAL MEDICINE

## 2023-07-17 PROCEDURE — 250N000012 HC RX MED GY IP 250 OP 636 PS 637: Performed by: INTERNAL MEDICINE

## 2023-07-17 PROCEDURE — 99310 SBSQ NF CARE HIGH MDM 45: CPT | Performed by: INTERNAL MEDICINE

## 2023-07-17 PROCEDURE — 250N000011 HC RX IP 250 OP 636: Mod: JZ | Performed by: INTERNAL MEDICINE

## 2023-07-17 PROCEDURE — 83735 ASSAY OF MAGNESIUM: CPT | Performed by: INTERNAL MEDICINE

## 2023-07-17 PROCEDURE — 36415 COLL VENOUS BLD VENIPUNCTURE: CPT | Performed by: INTERNAL MEDICINE

## 2023-07-17 PROCEDURE — 97530 THERAPEUTIC ACTIVITIES: CPT | Mod: GP | Performed by: PHYSICAL THERAPIST

## 2023-07-17 PROCEDURE — 84100 ASSAY OF PHOSPHORUS: CPT | Performed by: INTERNAL MEDICINE

## 2023-07-17 PROCEDURE — 120N000009 HC R&B SNF

## 2023-07-17 PROCEDURE — 97530 THERAPEUTIC ACTIVITIES: CPT | Mod: GO

## 2023-07-17 PROCEDURE — 250N000012 HC RX MED GY IP 250 OP 636 PS 637: Performed by: NURSE PRACTITIONER

## 2023-07-17 PROCEDURE — 85610 PROTHROMBIN TIME: CPT | Performed by: INTERNAL MEDICINE

## 2023-07-17 PROCEDURE — 85027 COMPLETE CBC AUTOMATED: CPT | Performed by: INTERNAL MEDICINE

## 2023-07-17 PROCEDURE — 80053 COMPREHEN METABOLIC PANEL: CPT | Performed by: INTERNAL MEDICINE

## 2023-07-17 PROCEDURE — 97110 THERAPEUTIC EXERCISES: CPT | Mod: GP | Performed by: PHYSICAL THERAPIST

## 2023-07-17 PROCEDURE — 80197 ASSAY OF TACROLIMUS: CPT | Performed by: INTERNAL MEDICINE

## 2023-07-17 RX ORDER — MAGNESIUM SULFATE HEPTAHYDRATE 40 MG/ML
4 INJECTION, SOLUTION INTRAVENOUS ONCE
Status: COMPLETED | OUTPATIENT
Start: 2023-07-17 | End: 2023-07-17

## 2023-07-17 RX ORDER — WARFARIN SODIUM 2.5 MG/1
2.5 TABLET ORAL
Status: COMPLETED | OUTPATIENT
Start: 2023-07-17 | End: 2023-07-17

## 2023-07-17 RX ORDER — POTASSIUM CHLORIDE 1.5 G/1.58G
40 POWDER, FOR SOLUTION ORAL ONCE
Status: COMPLETED | OUTPATIENT
Start: 2023-07-17 | End: 2023-07-17

## 2023-07-17 RX ADMIN — B-COMPLEX W/ C & FOLIC ACID TAB 1 MG 1 TABLET: 1 TAB at 13:33

## 2023-07-17 RX ADMIN — NYSTATIN 500000 UNITS: 500000 SUSPENSION ORAL at 13:33

## 2023-07-17 RX ADMIN — INSULIN ASPART 6 UNITS: 100 INJECTION, SOLUTION INTRAVENOUS; SUBCUTANEOUS at 18:37

## 2023-07-17 RX ADMIN — URSODIOL 250 MG: 250 TABLET ORAL at 21:27

## 2023-07-17 RX ADMIN — VALGANCICLOVIR HYDROCHLORIDE 450 MG: 450 TABLET ORAL at 21:27

## 2023-07-17 RX ADMIN — BUMETANIDE 2 MG: 1 TABLET ORAL at 16:33

## 2023-07-17 RX ADMIN — NYSTATIN 500000 UNITS: 500000 SUSPENSION ORAL at 21:28

## 2023-07-17 RX ADMIN — ATORVASTATIN CALCIUM 20 MG: 10 TABLET, FILM COATED ORAL at 21:27

## 2023-07-17 RX ADMIN — INSULIN ASPART 5 UNITS: 100 INJECTION, SOLUTION INTRAVENOUS; SUBCUTANEOUS at 10:00

## 2023-07-17 RX ADMIN — Medication 10 MG: at 21:40

## 2023-07-17 RX ADMIN — TACROLIMUS 3 MG: 1 CAPSULE ORAL at 09:56

## 2023-07-17 RX ADMIN — PREDNISONE 20 MG: 20 TABLET ORAL at 09:32

## 2023-07-17 RX ADMIN — PANTOPRAZOLE SODIUM 40 MG: 40 TABLET, DELAYED RELEASE ORAL at 06:11

## 2023-07-17 RX ADMIN — PREGABALIN 25 MG: 25 CAPSULE ORAL at 09:32

## 2023-07-17 RX ADMIN — INSULIN HUMAN 5 UNITS: 100 INJECTION, SUSPENSION SUBCUTANEOUS at 10:00

## 2023-07-17 RX ADMIN — MYCOPHENOLIC ACID 540 MG: 180 TABLET, DELAYED RELEASE ORAL at 09:31

## 2023-07-17 RX ADMIN — NYSTATIN 500000 UNITS: 500000 SUSPENSION ORAL at 09:30

## 2023-07-17 RX ADMIN — MAGNESIUM SULFATE HEPTAHYDRATE 4 G: 40 INJECTION, SOLUTION INTRAVENOUS at 11:39

## 2023-07-17 RX ADMIN — WARFARIN SODIUM 2.5 MG: 2.5 TABLET ORAL at 18:40

## 2023-07-17 RX ADMIN — POTASSIUM CHLORIDE 40 MEQ: 1.5 POWDER, FOR SOLUTION ORAL at 09:31

## 2023-07-17 RX ADMIN — TACROLIMUS 3 MG: 1 CAPSULE ORAL at 18:39

## 2023-07-17 RX ADMIN — NYSTATIN 500000 UNITS: 500000 SUSPENSION ORAL at 16:33

## 2023-07-17 RX ADMIN — POLYETHYLENE GLYCOL 3350 17 G: 17 POWDER, FOR SOLUTION ORAL at 09:31

## 2023-07-17 RX ADMIN — BUMETANIDE 2 MG: 1 TABLET ORAL at 09:31

## 2023-07-17 RX ADMIN — MAGNESIUM OXIDE TAB 400 MG (241.3 MG ELEMENTAL MG) 400 MG: 400 (241.3 MG) TAB at 13:33

## 2023-07-17 RX ADMIN — Medication 12.5 MG: at 21:26

## 2023-07-17 RX ADMIN — ASPIRIN 325 MG: 325 TABLET, FILM COATED ORAL at 09:31

## 2023-07-17 RX ADMIN — INSULIN ASPART 4 UNITS: 100 INJECTION, SOLUTION INTRAVENOUS; SUBCUTANEOUS at 18:37

## 2023-07-17 RX ADMIN — SULFAMETHOXAZOLE AND TRIMETHOPRIM 1 TABLET: 400; 80 TABLET ORAL at 09:31

## 2023-07-17 RX ADMIN — DICLOFENAC SODIUM 4 G: 10 GEL TOPICAL at 21:29

## 2023-07-17 RX ADMIN — MYCOPHENOLIC ACID 540 MG: 180 TABLET, DELAYED RELEASE ORAL at 18:39

## 2023-07-17 RX ADMIN — URSODIOL 250 MG: 250 TABLET ORAL at 09:32

## 2023-07-17 RX ADMIN — ALLOPURINOL 100 MG: 100 TABLET ORAL at 09:31

## 2023-07-17 RX ADMIN — MAGNESIUM OXIDE TAB 400 MG (241.3 MG ELEMENTAL MG) 400 MG: 400 (241.3 MG) TAB at 21:27

## 2023-07-17 RX ADMIN — INSULIN ASPART 2 UNITS: 100 INJECTION, SOLUTION INTRAVENOUS; SUBCUTANEOUS at 13:37

## 2023-07-17 ASSESSMENT — ACTIVITIES OF DAILY LIVING (ADL)
ADLS_ACUITY_SCORE: 33

## 2023-07-17 NOTE — PROGRESS NOTES
Left message for patient to contact the office.     [facility]    Medicine Progress Note - Hospitalist Service    Date of Admission:  2023    Assessment & Plan   Damion Quinones is a 67 year old male admitted on 2023 for ongoing rehabilitation after hospitalization at [place].    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   He underwent simultaneous liver kidney transplant on 2023. RTOR on  with concern for low flow in the renal graft - ex-lap, washout, closure with healthy appearing graft with intact arterial and venous flow. He is hospital course was also complicated by renal failure requiring CRRT and intermittent hemodialysis, RIJ clot, atrial fibrillation, hypertension, fluid overload, hypoxemia, anemia, leukocytosis, malnutrition, steroids/tube feed induced hyperglycemia, gout and deconditioning.  For details of hospitalization please refer to the discharge summary note on 2023.    Patient was admitted to TCU to complete rehabilitation program.     Expected discharge on     Today's changes:     Replace potassium   Replace magnesium   Reduce lantus to 6 units   Follow Tacro level   IS managed by txp team   Warfarin adjusted by pharmacy   Stay on 10 mg prednisone till seen by transplant . Will start   lyrica could stop  ( had been on 25 mg TID , being tapered )      # Physical deconditioning:   - Continue working with PT and OT.      # S/p DBD simultaneous liver kidney transplant on 2023 with complex reconstruction of accessory right hepatic artery:  # H/o CKD related IgA nephropathy and ANCA vasculitis s/p  donor renal transplant on 2023 with ureteral/J stent placement, delayed graft function:     Per txp         Liver transplant w/ complex reconstruction of accessory right hepatic artery:  No biliary stent. US liver on 7/5 due to increased transaminases. No acute findings on US, normal doppler.      Kidney transplant w/ stent, delayed graft function:   Cr 1.8, stable. 6/20 biopsy: ATN, no rejection.     Immunosuppression:  Induction: Steroid taper and Thymoglobulin 400 mg (4 mg/kg).   Maintenance:   - Myco phenolic 540 mg BID   - Tacrolimus goal level 5-6 per Dr. Clifton on 7/3.   currently on 3 mg BID   Valcyte 450 mg daily           Infection prophylaxis: PJP (bactrim), viral (Valcyte x 12 weeks)     Transplant coordinator: Cindy Clay (Liver), Angelita Torres (Kidney) [phone]  Donor type: DBD  DSA at time of transplant:  Yes CW9 6/5/23  Ureteral stent: Yes  Biliary stent: No       # Acute on chronic anemia:    - Hgb stable 7-8 without any s/sx of bleeding.  - Hemoglobin level 7.4        Plan:  - CBC every Monday Wednesday Friday.  - Transfuse if hemoglobin less than 7 g/dL.    # Leucocytosis ; resolved 7/17  --    # LE edema   -- Continue on Bumex BID. LE edema improving.      # RIJ clot:   * US 6/15 with partially occlusive inferior R internal jugular thrombus (previously partially occlusive thrombus in right axillary resolved).   * Patient is currently on heparin infusion at low intensity and warfarin.  * INR goal is 1.5-2.   Plan:   Coumadin per pharmacy.      # Cardiology:  # Coronary artery disease, paroxysmal atrial fibrillation  * Patient was seen by cardiology during inpatient hospital stay.  * Patient is currently on aspirin, atorvastatin, metoprolol and warfarin.  Due to risks of bleeding and INR goal is lowered at 1.5-2     Plan:  - Continue aspirin, metoprolol, atorvastatin  - Continue warfarin.  Pharmacy dosing       # Steroid/TF induced hyperglycemia:  * HgA1c 6.1% (6/6/2023).  .        ## Rheumatology:  # Psoriatic arthritis: Prior to admission was on prednisone 10 mg daily.  # Gout:  see pred above           # Hepatitis B s Ag +: Noted to be positive pre-transplant on 6/5/23,  but not previously positive (1/26/2023). HBV DNA negative on 6/5/23. No clear at risk behavior. Repeat Hep B s Ag was negative and HBV DNA not detected; suspect 6/5/2023 HBsAg positivity was a false positive.                           Diet: Regular Diet Adult  Snacks/Supplements Adult: Other; Please allow pt/RN to order snacks/supplements PRN; Between Meals    DVT Prophylaxis: Warfarin  Lux Catheter: Not present  Lines: None     Cardiac Monitoring: None  Code Status: Full Code      Clinically Significant Risk Factors              Disposition Plan     Expected Discharge Date: 07/27/2023                  Lashay Rachel MD  Hospitalist Service  [facility]  Securely message with [employer] (more info)  Text page via [email or web address] Paging/Directory   ______________________________________________________________________    Interval History   No new symptoms reported per nursing staff .  Last night notes reviewed .  No chest pain or Shortness of breath reported.  No vomiting   No difficulty with voiding   Passing gas .    4 system ROS reviewed .    Physical Exam   Vital Signs: Temp: 98.5  F (36.9  C) Temp src: Oral BP: 107/63 Pulse: 87   Resp: 16 SpO2: 97 % O2 Device: None (Room air)    Weight: 212 lbs 3.2 oz    General Appearance: Alert and oriented . Very pleasant and interactive  Respiratory: clear BL  Cardiovascular: S1 and s2  GI: soft non tender , BS positive  Skin: no jaundice  Other: interactive    Medical Decision Making       45 MINUTES SPENT BY ME on the date of service doing chart review, history, exam, documentation & further activities per the note.      Data     I have personally reviewed the following data over the past 24 hrs:    8.2  \   7.4 (L)   / 172     140 98 30.1 (H) /  98   3.2 (L); 3.2 (L) 30 (H) 1.06 \       ALT: 48 AST: 19 AP: 269 (H) TBILI: 0.4   ALB: 3.0 (L) TOT PROTEIN: 5.2 (L) LIPASE: N/A       INR:  1.47 (H) PTT:  N/A   D-dimer:  N/A Fibrinogen:  N/A

## 2023-07-17 NOTE — PLAN OF CARE
Goal Outcome Evaluation:       1900 to 2330:   Patient is alert and oriented x4.  On room air.  Patient sitting in chair.  Regular diet, takes pills whole.  Urinal at bedside, staff empties.   Protective meplex on right elbow.  BG was 171.  Patient on carb count and sliding scale.

## 2023-07-17 NOTE — PLAN OF CARE
Goal Outcome Evaluation:    FOCUS/GOAL    Bowel management, Bladder management, Medication management, Wound care management, Mobility, Skin integrity and Safety management    ASSESSMENT, INTERVENTIONS AND CONTINUING PLAN FOR GOAL:    Pt is A&OX4, calm, & cooperative with care. Denied CP, SOB, & n/v. A of 1 with walker & GB for transfer. Spent the night on a recliner. Continent for both B&B; uses BSC. Urinal at the bedside. R lower forearm PIV patent, NS locked, & dressing CDI. Takes meds whole with thin liquid. Potassium level is 4.6, magnesium level is 2.0, & phosphorus level is 2.8 per 07/16/23 lab result; AM lab draw scheduled. Pt slept well for most of the night & no acute issue. Able to make needs known & call light within reach. Will continue with POC.    Patient's most recent vital signs are:     Vital signs:  BP: 110/62  Temp: 98  HR: 100  RR: 16  SpO2: 100 %     Patient does not have new respiratory symptoms.  Patient does not have new sore throat.  Patient does not have a fever greater than 99.5.

## 2023-07-17 NOTE — PLAN OF CARE
Goal Outcome Evaluation:       Pt A&O. Can make needs known. Denies pain, SOB, N/V or chest pain. Pt's wife at bedside assisting in cares. RL forearm PIV DCI and patient. Given IV Mag replacement this morning- lab draw at 1600. Given K replacement- lab draw scheduled for 1330- potassium now 4.0. BS 9 and 134, sliding scale not needed. Carb coverage given. Regular diet, ate 100% of meals. Continent of B&B. Uses urinal at bedside and BSC. LBM 7/17. No acute issues this shift. Continue POC.          Patient's most recent vital signs are:     Vital signs:  BP: 107/63  Temp: 98.5  HR: 87  RR: 16  SpO2: 97 %     Patient does not have new respiratory symptoms.  Patient does not have new sore throat.  Patient does not have a fever greater than 99.5.

## 2023-07-17 NOTE — PROGRESS NOTES
Transplant Surgery  Immunosuppression Note    Damion Quinones is a 67 year old male with history of cirrhosis (alcohol + hemochromatosis), ESKD (IgA nephropathy + ANCA vasculitis), PAF, obesity, HTN, pre-diabetes, rheumatoid and psoriatic arthritis, and CAD. S/p simultaneous liver and kidney transplant on 6/6/2023 complicated by DGF, AF RVR, shock, gout flare, right internal jugular clot, and malnutrition. Transferred to TCU for ongoing rehabilitation on 6/25/23.     POD # 41    Liver transplant w/ complex reconstruction of accessory right hepatic artery: No biliary stent. Last liver US 7/5 with normal doppler. LFTs trending down.     Kidney transplant w/ stent, delayed graft function: 6/20 biopsy: ATN, no rejection. Cr 1.8->1.4->1.2->1.1, improving.      Immunosuppression:  Induction: Steroid taper and Thymoglobulin 400 mg (4 mg/kg).   Maintenance:    -  mg BID -> Myfortic 540 mg BID d/t abdominal discomfort; can be decreased to 360 mg BID if symptoms persist.    - Tacrolimus 3 mg BID. Goal level 5-6 per Dr. Clifton.   - Prednisone pulse d/t gout flare with taper to 10 mg daily.   Infection prophylaxis: PJP (Bactrim), viral (Valcyte x 12 weeks)    Transplant coordinator: Cindy Clay (Liver), Angelita Torres (Kidney) [phone]  Donor type: DBD  DSA at time of transplant:  Yes CW9 6/5/23  Ureteral stent: Yes  Biliary stent: No    Plan:   - Level 5.6 (13hr trough), no change in tacrolimus dosing      Lizbeth Greer NP   Liver Transplant Surgery [facility] [phone]

## 2023-07-18 ENCOUNTER — APPOINTMENT (OUTPATIENT)
Dept: PHYSICAL THERAPY | Facility: SKILLED NURSING FACILITY | Age: 68
End: 2023-07-18
Attending: INTERNAL MEDICINE
Payer: COMMERCIAL

## 2023-07-18 ENCOUNTER — APPOINTMENT (OUTPATIENT)
Dept: OCCUPATIONAL THERAPY | Facility: SKILLED NURSING FACILITY | Age: 68
End: 2023-07-18
Attending: INTERNAL MEDICINE
Payer: COMMERCIAL

## 2023-07-18 LAB
GLUCOSE BLDC GLUCOMTR-MCNC: 128 MG/DL (ref 70–99)
GLUCOSE BLDC GLUCOMTR-MCNC: 148 MG/DL (ref 70–99)
GLUCOSE BLDC GLUCOMTR-MCNC: 204 MG/DL (ref 70–99)
GLUCOSE BLDC GLUCOMTR-MCNC: 88 MG/DL (ref 70–99)
INR PPP: 1.55 (ref 0.85–1.15)
MAGNESIUM SERPL-MCNC: 1.9 MG/DL (ref 1.7–2.3)
PHOSPHATE SERPL-MCNC: 2.3 MG/DL (ref 2.5–4.5)
POTASSIUM SERPL-SCNC: 3.4 MMOL/L (ref 3.4–5.3)
POTASSIUM SERPL-SCNC: 4.3 MMOL/L (ref 3.4–5.3)

## 2023-07-18 PROCEDURE — 250N000013 HC RX MED GY IP 250 OP 250 PS 637

## 2023-07-18 PROCEDURE — 250N000013 HC RX MED GY IP 250 OP 250 PS 637: Performed by: INTERNAL MEDICINE

## 2023-07-18 PROCEDURE — 120N000009 HC R&B SNF

## 2023-07-18 PROCEDURE — 84100 ASSAY OF PHOSPHORUS: CPT | Performed by: INTERNAL MEDICINE

## 2023-07-18 PROCEDURE — 250N000012 HC RX MED GY IP 250 OP 636 PS 637: Performed by: NURSE PRACTITIONER

## 2023-07-18 PROCEDURE — 83735 ASSAY OF MAGNESIUM: CPT | Performed by: INTERNAL MEDICINE

## 2023-07-18 PROCEDURE — 85610 PROTHROMBIN TIME: CPT | Performed by: INTERNAL MEDICINE

## 2023-07-18 PROCEDURE — 97530 THERAPEUTIC ACTIVITIES: CPT | Mod: GO | Performed by: OCCUPATIONAL THERAPIST

## 2023-07-18 PROCEDURE — 97530 THERAPEUTIC ACTIVITIES: CPT | Mod: GP

## 2023-07-18 PROCEDURE — 250N000012 HC RX MED GY IP 250 OP 636 PS 637: Performed by: INTERNAL MEDICINE

## 2023-07-18 PROCEDURE — 36415 COLL VENOUS BLD VENIPUNCTURE: CPT | Performed by: INTERNAL MEDICINE

## 2023-07-18 PROCEDURE — 84132 ASSAY OF SERUM POTASSIUM: CPT | Performed by: INTERNAL MEDICINE

## 2023-07-18 RX ORDER — WARFARIN SODIUM 2 MG/1
2 TABLET ORAL
Status: COMPLETED | OUTPATIENT
Start: 2023-07-18 | End: 2023-07-18

## 2023-07-18 RX ORDER — POTASSIUM CHLORIDE 750 MG/1
40 TABLET, EXTENDED RELEASE ORAL ONCE
Status: COMPLETED | OUTPATIENT
Start: 2023-07-18 | End: 2023-07-18

## 2023-07-18 RX ADMIN — ATORVASTATIN CALCIUM 20 MG: 10 TABLET, FILM COATED ORAL at 21:24

## 2023-07-18 RX ADMIN — BUMETANIDE 2 MG: 1 TABLET ORAL at 16:23

## 2023-07-18 RX ADMIN — URSODIOL 250 MG: 250 TABLET ORAL at 21:23

## 2023-07-18 RX ADMIN — ALLOPURINOL 100 MG: 100 TABLET ORAL at 08:14

## 2023-07-18 RX ADMIN — INSULIN ASPART 3 UNITS: 100 INJECTION, SOLUTION INTRAVENOUS; SUBCUTANEOUS at 18:20

## 2023-07-18 RX ADMIN — ASPIRIN 325 MG: 325 TABLET, FILM COATED ORAL at 08:16

## 2023-07-18 RX ADMIN — NYSTATIN 500000 UNITS: 500000 SUSPENSION ORAL at 21:23

## 2023-07-18 RX ADMIN — POTASSIUM & SODIUM PHOSPHATES POWDER PACK 280-160-250 MG 1 PACKET: 280-160-250 PACK at 18:24

## 2023-07-18 RX ADMIN — NYSTATIN 500000 UNITS: 500000 SUSPENSION ORAL at 08:14

## 2023-07-18 RX ADMIN — Medication 12.5 MG: at 06:12

## 2023-07-18 RX ADMIN — INSULIN HUMAN 5 UNITS: 100 INJECTION, SUSPENSION SUBCUTANEOUS at 08:12

## 2023-07-18 RX ADMIN — PANTOPRAZOLE SODIUM 40 MG: 40 TABLET, DELAYED RELEASE ORAL at 06:12

## 2023-07-18 RX ADMIN — POTASSIUM & SODIUM PHOSPHATES POWDER PACK 280-160-250 MG 1 PACKET: 280-160-250 PACK at 11:35

## 2023-07-18 RX ADMIN — NYSTATIN 500000 UNITS: 500000 SUSPENSION ORAL at 16:23

## 2023-07-18 RX ADMIN — Medication 12.5 MG: at 21:23

## 2023-07-18 RX ADMIN — TACROLIMUS 3 MG: 1 CAPSULE ORAL at 18:23

## 2023-07-18 RX ADMIN — VALGANCICLOVIR HYDROCHLORIDE 450 MG: 450 TABLET ORAL at 21:22

## 2023-07-18 RX ADMIN — DICLOFENAC SODIUM 4 G: 10 GEL TOPICAL at 08:22

## 2023-07-18 RX ADMIN — B-COMPLEX W/ C & FOLIC ACID TAB 1 MG 1 TABLET: 1 TAB at 11:35

## 2023-07-18 RX ADMIN — BUMETANIDE 2 MG: 1 TABLET ORAL at 08:16

## 2023-07-18 RX ADMIN — URSODIOL 250 MG: 250 TABLET ORAL at 08:16

## 2023-07-18 RX ADMIN — WARFARIN SODIUM 2 MG: 2 TABLET ORAL at 18:24

## 2023-07-18 RX ADMIN — MYCOPHENOLIC ACID 540 MG: 180 TABLET, DELAYED RELEASE ORAL at 08:15

## 2023-07-18 RX ADMIN — INSULIN ASPART 9 UNITS: 100 INJECTION, SOLUTION INTRAVENOUS; SUBCUTANEOUS at 18:21

## 2023-07-18 RX ADMIN — POTASSIUM CHLORIDE 40 MEQ: 750 TABLET, EXTENDED RELEASE ORAL at 16:22

## 2023-07-18 RX ADMIN — POLYETHYLENE GLYCOL 3350 17 G: 17 POWDER, FOR SOLUTION ORAL at 08:14

## 2023-07-18 RX ADMIN — PREGABALIN 25 MG: 25 CAPSULE ORAL at 08:15

## 2023-07-18 RX ADMIN — POTASSIUM & SODIUM PHOSPHATES POWDER PACK 280-160-250 MG 1 PACKET: 280-160-250 PACK at 15:03

## 2023-07-18 RX ADMIN — MYCOPHENOLIC ACID 540 MG: 180 TABLET, DELAYED RELEASE ORAL at 18:23

## 2023-07-18 RX ADMIN — PREDNISONE 20 MG: 20 TABLET ORAL at 08:21

## 2023-07-18 RX ADMIN — INSULIN ASPART 10 UNITS: 100 INJECTION, SOLUTION INTRAVENOUS; SUBCUTANEOUS at 08:09

## 2023-07-18 RX ADMIN — NYSTATIN 500000 UNITS: 500000 SUSPENSION ORAL at 13:30

## 2023-07-18 RX ADMIN — MAGNESIUM OXIDE TAB 400 MG (241.3 MG ELEMENTAL MG) 400 MG: 400 (241.3 MG) TAB at 11:35

## 2023-07-18 RX ADMIN — SULFAMETHOXAZOLE AND TRIMETHOPRIM 1 TABLET: 400; 80 TABLET ORAL at 08:16

## 2023-07-18 RX ADMIN — DICLOFENAC SODIUM 4 G: 10 GEL TOPICAL at 21:21

## 2023-07-18 RX ADMIN — TACROLIMUS 3 MG: 1 CAPSULE ORAL at 08:15

## 2023-07-18 RX ADMIN — INSULIN ASPART 4 UNITS: 100 INJECTION, SOLUTION INTRAVENOUS; SUBCUTANEOUS at 13:28

## 2023-07-18 RX ADMIN — MAGNESIUM OXIDE TAB 400 MG (241.3 MG ELEMENTAL MG) 400 MG: 400 (241.3 MG) TAB at 21:23

## 2023-07-18 ASSESSMENT — ACTIVITIES OF DAILY LIVING (ADL)
ADLS_ACUITY_SCORE: 33

## 2023-07-18 NOTE — PROGRESS NOTES
SPIRITUAL HEALTH SERVICES  [facility]    REFERRAL SOURCE: Follow up per plan of care    Brief check in with Casey to assess interest in visit today, politely declined and said he anticipates taking a nap soon. Open to follow up later this week.    PLAN: I will re-attempt later this week. [facility] Services remains available for patient, family, and staff support.       MARISELA Bautista.   Associate

## 2023-07-18 NOTE — PROGRESS NOTES
07/18/23 0900   Appointment Info   Signing Clinician's Name / Credentials (PT) Rhianna Barney PTA   PT Assistant Visit Number 2   Therapeutic Activity   Therapeutic Activities: dynamic activities to improve functional performance Minutes (47292) 45   Symptoms Noted During/After Treatment Fatigue   Treatment Detail/Skilled Intervention PT: focus on functional activities. see gg.....education on diaphragmatic breathing for decreased SOB and performing activities in slow controlled manner for joint protection and safety. Pt able to perform and verbalize understanding. Increased cueing w/fatigue.   PT Discharge Planning   PT Plan PT: steps in gym - perform multiple steps?, standing LE strengthening, NuStep w/LE only, amb w/FWW and w/c follow.   PT Discharge Recommendation (DC Rec) home with assist   PT Rationale for DC Rec PT: Pt lives in [place] with wife at baseline. Plan to return. Ramp to access main level, but has only commode and no bed. 8 stairs up to full bathroom and bed   PT Brief overview of current status PT:  Amb up to 100' bouts with CGA x 1 and second person for wc follow, squat pivot and stand pivot no AD, CGA - min A x 1   Total Session Time   Timed Code Treatment Minutes 45   Total Session Time (sum of timed and untimed services) 45   Post Acute Settings Only   What unit is patient on? TCU   Transfers: Sit to Stand   Patient Performance Supervision or verbal cues   Staff Performance Set up help only   Equipment Used Rolling walker   Describe Performance PT: STS, FWW from w/c or chair, close SBA with cues for reaching back with arm to arm rest and controlling of descent for improved safety. Pt able to verbalize understanding and comply with cues ~80% of time.   Ambulation   Walks in room:Patient Performance Steadying assist   Walks in room: Staff Performance One person assist (one person physical assist)   Walk in pierre: Patient Performance Steadying assist   Walks in pierre: Staff Performance Two  "+person assist ( two plus persons physical assist)   Mobility device used rolling walker   Describe Performance PT: amb ~65 - 80ft x 8, FWW, CGA with close w/c follow by second person. Pt required seated rest breaks between bouts d/t fatigue. Pt able to perform turns, navigate around objects and across different surfaces. Pt displayed improved pacing and safety awareness today requiring decreased cues for safety and gait speed.   Walk 10 Feet   Describe Performance PT: see amb   Walk 10 Feet on uneven surfaces   Describe Performance PT: see amb   Walk 50 Feet with Two Turns   Describe Performance PT: see amb   Walk 150 Feet   Describe Performance PT: see amb   Locomotion   Describe Performance PT: see amb   4 Steps   Describe Performance PT: ascend/descend 3 - 6\" stairs x 2 bouts then progressed to 5 - 6\" stairs x 1 bout, B HR, min A/CGA with cues for step to pattern. Noted Pt able to step up w/RLE and down w/LLE today but unable to step up leading w/LLE. Pt performed step up/step down on 1st step of stair today. Educated Pt on use of FWW sideways on stair as option for stairs in home after viewing pictures of home stairs provided by spouse.       "

## 2023-07-18 NOTE — PLAN OF CARE
A Ox4, denies CP, SOB, and NV. Eats regular diet, 2 assist. Take med. whole with thin liquid. Continent to B&B. Ate 60% of dinner. Mag. Level 2.4 after replacement on am shift.              Patient's most recent vital signs are:     Vital signs:  BP: 108/68  Temp: 98.1  HR: 110  RR: 17  SpO2: 98 %     Patient does not have new respiratory symptoms.  Patient does not have new sore throat.  Patient does not have a fever greater than 99.5.

## 2023-07-18 NOTE — PLAN OF CARE
Potassium lab results is 3.4, Magnisum  Is 1.9, phosphorus is 2.3. Replaced phosphate x 2 in this shift. Recheck phosphate  tomorrow morning . notified on coming nurse for replacment of potassium. Pt eat break fast with good appetite. Pt denied chest pain, SOB, N/V. A1 with walker and gait belt. Pt wife visited during the shift ,involved in cares wheeled pt in unit and outside. PIV on R fore arm intact and dressing CDI. On sliding scale and carb coverage insuline. Pt is up in recliner all shift. Call light with in reach. Continue with POC.       Patient's most recent vital signs are:     Vital signs:  BP: 90/61  Temp: 98  HR: 101  RR: 18  SpO2: 100 %     Patient does not have new respiratory symptoms.  Patient does not have new sore throat.  Patient does not have a fever greater than 99.5.

## 2023-07-18 NOTE — PLAN OF CARE
Goal Outcome Evaluation:  No acute issues overnight. Appears to be sleeping well between cares. Has no c/o's pain, discomfort, CP/SOB during shift. Continent using urinal indep.- staff empties. Assist of 1 - GB/walker to BSC - continent small BM. On standard Mg./K+ and Phos.replacement - labs ordered this AM per protocol.         Patient's most recent vital signs are:     Vital signs:  BP: 108/68  Temp: 98.1  HR: 110  RR: 17  SpO2: 98 %     Patient does not have new respiratory symptoms.  Patient does not have new sore throat.  Patient does not have a fever greater than 99.5.

## 2023-07-19 ENCOUNTER — APPOINTMENT (OUTPATIENT)
Dept: OCCUPATIONAL THERAPY | Facility: SKILLED NURSING FACILITY | Age: 68
End: 2023-07-19
Attending: INTERNAL MEDICINE
Payer: COMMERCIAL

## 2023-07-19 ENCOUNTER — APPOINTMENT (OUTPATIENT)
Dept: PHYSICAL THERAPY | Facility: SKILLED NURSING FACILITY | Age: 68
End: 2023-07-19
Attending: INTERNAL MEDICINE
Payer: COMMERCIAL

## 2023-07-19 LAB
ALBUMIN SERPL BCG-MCNC: 2.9 G/DL (ref 3.5–5.2)
ALP SERPL-CCNC: 236 U/L (ref 40–129)
ALT SERPL W P-5'-P-CCNC: 38 U/L (ref 0–70)
ANION GAP SERPL CALCULATED.3IONS-SCNC: 11 MMOL/L (ref 7–15)
AST SERPL W P-5'-P-CCNC: 21 U/L (ref 0–45)
BASOPHILS # BLD MANUAL: 0 10E3/UL (ref 0–0.2)
BASOPHILS NFR BLD MANUAL: 0 %
BILIRUB SERPL-MCNC: 0.4 MG/DL
BUN SERPL-MCNC: 32.1 MG/DL (ref 8–23)
CALCIUM SERPL-MCNC: 8.6 MG/DL (ref 8.8–10.2)
CHLORIDE SERPL-SCNC: 99 MMOL/L (ref 98–107)
CREAT SERPL-MCNC: 1.15 MG/DL (ref 0.67–1.17)
DACRYOCYTES BLD QL SMEAR: SLIGHT
DEPRECATED HCO3 PLAS-SCNC: 28 MMOL/L (ref 22–29)
EOSINOPHIL # BLD MANUAL: 0.1 10E3/UL (ref 0–0.7)
EOSINOPHIL NFR BLD MANUAL: 2 %
ERYTHROCYTE [DISTWIDTH] IN BLOOD BY AUTOMATED COUNT: 18.8 % (ref 10–15)
GFR SERPL CREATININE-BSD FRML MDRD: 71 ML/MIN/1.73M2
GLUCOSE BLDC GLUCOMTR-MCNC: 128 MG/DL (ref 70–99)
GLUCOSE BLDC GLUCOMTR-MCNC: 140 MG/DL (ref 70–99)
GLUCOSE BLDC GLUCOMTR-MCNC: 145 MG/DL (ref 70–99)
GLUCOSE BLDC GLUCOMTR-MCNC: 99 MG/DL (ref 70–99)
GLUCOSE SERPL-MCNC: 95 MG/DL (ref 70–99)
HCT VFR BLD AUTO: 24.6 % (ref 40–53)
HGB BLD-MCNC: 7.4 G/DL (ref 13.3–17.7)
INR PPP: 1.6 (ref 0.85–1.15)
LYMPHOCYTES # BLD MANUAL: 0.1 10E3/UL (ref 0.8–5.3)
LYMPHOCYTES NFR BLD MANUAL: 2 %
MAGNESIUM SERPL-MCNC: 1.5 MG/DL (ref 1.7–2.3)
MAGNESIUM SERPL-MCNC: 2.2 MG/DL (ref 1.7–2.3)
MCH RBC QN AUTO: 29.5 PG (ref 26.5–33)
MCHC RBC AUTO-ENTMCNC: 30.1 G/DL (ref 31.5–36.5)
MCV RBC AUTO: 98 FL (ref 78–100)
METAMYELOCYTES # BLD MANUAL: 0.1 10E3/UL
METAMYELOCYTES NFR BLD MANUAL: 1 %
MONOCYTES # BLD MANUAL: 0.4 10E3/UL (ref 0–1.3)
MONOCYTES NFR BLD MANUAL: 6 %
MYELOCYTES # BLD MANUAL: 0.1 10E3/UL
MYELOCYTES NFR BLD MANUAL: 2 %
NEUTROPHILS # BLD MANUAL: 6.4 10E3/UL (ref 1.6–8.3)
NEUTROPHILS NFR BLD MANUAL: 87 %
PHOSPHATE SERPL-MCNC: 2.4 MG/DL (ref 2.5–4.5)
PLAT MORPH BLD: ABNORMAL
PLATELET # BLD AUTO: 204 10E3/UL (ref 150–450)
POLYCHROMASIA BLD QL SMEAR: SLIGHT
POTASSIUM SERPL-SCNC: 3.7 MMOL/L (ref 3.4–5.3)
PROT SERPL-MCNC: 5.2 G/DL (ref 6.4–8.3)
RBC # BLD AUTO: 2.51 10E6/UL (ref 4.4–5.9)
RBC MORPH BLD: ABNORMAL
SODIUM SERPL-SCNC: 138 MMOL/L (ref 136–145)
TACROLIMUS BLD-MCNC: 5.5 UG/L (ref 5–15)
TME LAST DOSE: NORMAL H
TME LAST DOSE: NORMAL H
WBC # BLD AUTO: 7.4 10E3/UL (ref 4–11)

## 2023-07-19 PROCEDURE — 97530 THERAPEUTIC ACTIVITIES: CPT | Mod: GO

## 2023-07-19 PROCEDURE — 250N000013 HC RX MED GY IP 250 OP 250 PS 637: Performed by: INTERNAL MEDICINE

## 2023-07-19 PROCEDURE — 120N000009 HC R&B SNF

## 2023-07-19 PROCEDURE — 80053 COMPREHEN METABOLIC PANEL: CPT | Performed by: INTERNAL MEDICINE

## 2023-07-19 PROCEDURE — 85027 COMPLETE CBC AUTOMATED: CPT | Performed by: INTERNAL MEDICINE

## 2023-07-19 PROCEDURE — 250N000013 HC RX MED GY IP 250 OP 250 PS 637

## 2023-07-19 PROCEDURE — 250N000011 HC RX IP 250 OP 636: Mod: JZ | Performed by: INTERNAL MEDICINE

## 2023-07-19 PROCEDURE — 97530 THERAPEUTIC ACTIVITIES: CPT | Mod: GP | Performed by: PHYSICAL THERAPIST

## 2023-07-19 PROCEDURE — 83735 ASSAY OF MAGNESIUM: CPT | Performed by: INTERNAL MEDICINE

## 2023-07-19 PROCEDURE — 84100 ASSAY OF PHOSPHORUS: CPT | Performed by: INTERNAL MEDICINE

## 2023-07-19 PROCEDURE — 250N000012 HC RX MED GY IP 250 OP 636 PS 637: Performed by: NURSE PRACTITIONER

## 2023-07-19 PROCEDURE — 36415 COLL VENOUS BLD VENIPUNCTURE: CPT | Performed by: INTERNAL MEDICINE

## 2023-07-19 PROCEDURE — 250N000012 HC RX MED GY IP 250 OP 636 PS 637: Performed by: INTERNAL MEDICINE

## 2023-07-19 PROCEDURE — 85610 PROTHROMBIN TIME: CPT | Performed by: INTERNAL MEDICINE

## 2023-07-19 PROCEDURE — 80197 ASSAY OF TACROLIMUS: CPT | Performed by: INTERNAL MEDICINE

## 2023-07-19 PROCEDURE — 97110 THERAPEUTIC EXERCISES: CPT | Mod: GP | Performed by: PHYSICAL THERAPIST

## 2023-07-19 PROCEDURE — 85007 BL SMEAR W/DIFF WBC COUNT: CPT | Performed by: INTERNAL MEDICINE

## 2023-07-19 RX ORDER — WARFARIN SODIUM 2 MG/1
2 TABLET ORAL
Status: COMPLETED | OUTPATIENT
Start: 2023-07-19 | End: 2023-07-19

## 2023-07-19 RX ORDER — MAGNESIUM SULFATE HEPTAHYDRATE 40 MG/ML
4 INJECTION, SOLUTION INTRAVENOUS ONCE
Status: COMPLETED | OUTPATIENT
Start: 2023-07-19 | End: 2023-07-19

## 2023-07-19 RX ADMIN — ASPIRIN 325 MG: 325 TABLET, FILM COATED ORAL at 07:58

## 2023-07-19 RX ADMIN — POTASSIUM & SODIUM PHOSPHATES POWDER PACK 280-160-250 MG 1 PACKET: 280-160-250 PACK at 08:13

## 2023-07-19 RX ADMIN — ALLOPURINOL 100 MG: 100 TABLET ORAL at 07:59

## 2023-07-19 RX ADMIN — Medication 12.5 MG: at 13:16

## 2023-07-19 RX ADMIN — DICLOFENAC SODIUM 4 G: 10 GEL TOPICAL at 08:06

## 2023-07-19 RX ADMIN — PANTOPRAZOLE SODIUM 40 MG: 40 TABLET, DELAYED RELEASE ORAL at 06:09

## 2023-07-19 RX ADMIN — URSODIOL 250 MG: 250 TABLET ORAL at 20:55

## 2023-07-19 RX ADMIN — Medication 12.5 MG: at 21:04

## 2023-07-19 RX ADMIN — POLYETHYLENE GLYCOL 3350 17 G: 17 POWDER, FOR SOLUTION ORAL at 08:06

## 2023-07-19 RX ADMIN — INSULIN ASPART 8 UNITS: 100 INJECTION, SOLUTION INTRAVENOUS; SUBCUTANEOUS at 08:01

## 2023-07-19 RX ADMIN — TACROLIMUS 3 MG: 1 CAPSULE ORAL at 07:54

## 2023-07-19 RX ADMIN — NYSTATIN 500000 UNITS: 500000 SUSPENSION ORAL at 12:45

## 2023-07-19 RX ADMIN — TACROLIMUS 3 MG: 1 CAPSULE ORAL at 17:58

## 2023-07-19 RX ADMIN — MAGNESIUM OXIDE TAB 400 MG (241.3 MG ELEMENTAL MG) 400 MG: 400 (241.3 MG) TAB at 20:56

## 2023-07-19 RX ADMIN — VALGANCICLOVIR HYDROCHLORIDE 450 MG: 450 TABLET ORAL at 20:54

## 2023-07-19 RX ADMIN — SULFAMETHOXAZOLE AND TRIMETHOPRIM 1 TABLET: 400; 80 TABLET ORAL at 07:59

## 2023-07-19 RX ADMIN — MYCOPHENOLIC ACID 540 MG: 180 TABLET, DELAYED RELEASE ORAL at 17:59

## 2023-07-19 RX ADMIN — POTASSIUM & SODIUM PHOSPHATES POWDER PACK 280-160-250 MG 1 PACKET: 280-160-250 PACK at 12:45

## 2023-07-19 RX ADMIN — WARFARIN SODIUM 2 MG: 2 TABLET ORAL at 17:58

## 2023-07-19 RX ADMIN — MAGNESIUM SULFATE HEPTAHYDRATE 4 G: 40 INJECTION, SOLUTION INTRAVENOUS at 11:01

## 2023-07-19 RX ADMIN — ATORVASTATIN CALCIUM 20 MG: 10 TABLET, FILM COATED ORAL at 20:53

## 2023-07-19 RX ADMIN — INSULIN ASPART 6 UNITS: 100 INJECTION, SOLUTION INTRAVENOUS; SUBCUTANEOUS at 12:47

## 2023-07-19 RX ADMIN — URSODIOL 250 MG: 250 TABLET ORAL at 08:13

## 2023-07-19 RX ADMIN — NYSTATIN 500000 UNITS: 500000 SUSPENSION ORAL at 08:12

## 2023-07-19 RX ADMIN — NYSTATIN 500000 UNITS: 500000 SUSPENSION ORAL at 16:07

## 2023-07-19 RX ADMIN — INSULIN ASPART 6 UNITS: 100 INJECTION, SOLUTION INTRAVENOUS; SUBCUTANEOUS at 18:06

## 2023-07-19 RX ADMIN — POTASSIUM & SODIUM PHOSPHATES POWDER PACK 280-160-250 MG 1 PACKET: 280-160-250 PACK at 16:06

## 2023-07-19 RX ADMIN — INSULIN ASPART 1 UNITS: 100 INJECTION, SOLUTION INTRAVENOUS; SUBCUTANEOUS at 18:04

## 2023-07-19 RX ADMIN — MYCOPHENOLIC ACID 540 MG: 180 TABLET, DELAYED RELEASE ORAL at 07:57

## 2023-07-19 RX ADMIN — INSULIN ASPART 1 UNITS: 100 INJECTION, SOLUTION INTRAVENOUS; SUBCUTANEOUS at 12:45

## 2023-07-19 RX ADMIN — DICLOFENAC SODIUM 4 G: 10 GEL TOPICAL at 21:13

## 2023-07-19 RX ADMIN — BUMETANIDE 2 MG: 1 TABLET ORAL at 07:56

## 2023-07-19 RX ADMIN — PREDNISONE 10 MG: 5 TABLET ORAL at 07:56

## 2023-07-19 RX ADMIN — PREGABALIN 25 MG: 25 CAPSULE ORAL at 08:08

## 2023-07-19 RX ADMIN — BUMETANIDE 2 MG: 1 TABLET ORAL at 16:06

## 2023-07-19 RX ADMIN — B-COMPLEX W/ C & FOLIC ACID TAB 1 MG 1 TABLET: 1 TAB at 12:45

## 2023-07-19 RX ADMIN — NYSTATIN 500000 UNITS: 500000 SUSPENSION ORAL at 20:53

## 2023-07-19 RX ADMIN — MAGNESIUM OXIDE TAB 400 MG (241.3 MG ELEMENTAL MG) 400 MG: 400 (241.3 MG) TAB at 12:45

## 2023-07-19 ASSESSMENT — ACTIVITIES OF DAILY LIVING (ADL)
ADLS_ACUITY_SCORE: 33

## 2023-07-19 NOTE — PLAN OF CARE
Goal Outcome Evaluation:    FOCUS/GOAL    Bowel management, Bladder management, Medication management, Wound care management, Mobility, Skin integrity and Safety management    ASSESSMENT, INTERVENTIONS AND CONTINUING PLAN FOR GOAL:    Pt is A&OX4, calm, & cooperative with care. Denied CP, SOB, & n/v. VSS & on RA during the day. On 2L O2 @ HS. A of 1 with walker & GB for transfer. Spent the shift on a recliner. Continent for both B&B; uses BSC. LBM this shift. Urinal at the bedside. R lower forearm PIV patent, NS locked, & dressing CDI. Takes meds whole with thin liquid. Potassium level is 4.3, magnesium level is 1.9, & phosphorus level is 2.3 per 07/18/23 lab result; AM lab draw scheduled. Pt at dinner with good appetite & no acute issue. Able to make needs known & call light within reach. Will continue with plan of care.    Patient's most recent vital signs are:     Vital signs:  BP: 109/75  Temp: 98.4  HR: 98  RR: 16  SpO2: 97 %     Patient does not have new respiratory symptoms.  Patient does not have new sore throat.  Patient does not have a fever greater than 99.5.

## 2023-07-19 NOTE — PLAN OF CARE
Magnesium lab results from today is 1.5, phosphorus is 2.4. Replaced IV  Magnesium. Redraw magnesium is at 1630 ,and phosphate is tomorrow morning at 0600 AM(07/20/23).Pt is alert and oriented x 4. Denied chest pain, SOB, N/V. A/1 with walker and gait belt. R lower fore arm PIV patent, NS locked, and dressing CDI. Pt eat lunch and break fast with good appetite. BG's 99, and 140. Sliding scale carb coverage insulin given as per orders. Pt wife present in room. Able to make needs known. Continue with POC.    Patient's most recent vital signs are:     Vital signs:  BP: 106/73  Temp: 98.4  HR: 94  RR: 18  SpO2: 97 %     Patient does not have new respiratory symptoms.  Patient does not have new sore throat.  Patient does not have a fever greater than 99.5.

## 2023-07-19 NOTE — PLAN OF CARE
A & O x4; denies SOB; CH, NV. Continent to B& B.  1 A , eats regular diet; takes med.; Whole with thin liquid. R lower forearm PIV patent, NS locked, Insulin adm. Per sliding scale. No acute issue. Able to make needs known & call light within reach. Will continue POC              Patient's most recent vital signs are:     Vital signs:  BP: 102/66  Temp: 98.2  HR: 101  RR: 18  SpO2: 96 %     Patient does not have new respiratory symptoms.  Patient does not have new sore throat.  Patient does not have a fever greater than 99.5.

## 2023-07-19 NOTE — PLAN OF CARE
Goal Outcome Evaluation:  No acute issues overnight. Sleeping well / using O2 @ 2L via NC. Indep.bed mobility. Continent using urinal indep.- staff empties. Has no c/o's or requests as of yet. On standard Mg./K+ and Phos.replacement - monitor labs this AM.        Patient's most recent vital signs are:     Vital signs:  BP: 109/75  Temp: 98.4  HR: 98  RR: 16  SpO2: 97 %     Patient does not have new respiratory symptoms.  Patient does not have new sore throat.  Patient does not have a fever greater than 99.5.

## 2023-07-20 ENCOUNTER — APPOINTMENT (OUTPATIENT)
Dept: OCCUPATIONAL THERAPY | Facility: SKILLED NURSING FACILITY | Age: 68
End: 2023-07-20
Attending: INTERNAL MEDICINE
Payer: COMMERCIAL

## 2023-07-20 ENCOUNTER — APPOINTMENT (OUTPATIENT)
Dept: PHYSICAL THERAPY | Facility: SKILLED NURSING FACILITY | Age: 68
End: 2023-07-20
Attending: INTERNAL MEDICINE
Payer: COMMERCIAL

## 2023-07-20 LAB
BASOPHILS # BLD AUTO: 0 10E3/UL (ref 0–0.2)
BASOPHILS NFR BLD AUTO: 0 %
EOSINOPHIL # BLD AUTO: 0 10E3/UL (ref 0–0.7)
EOSINOPHIL NFR BLD AUTO: 1 %
ERYTHROCYTE [DISTWIDTH] IN BLOOD BY AUTOMATED COUNT: 18.3 % (ref 10–15)
GLUCOSE BLDC GLUCOMTR-MCNC: 102 MG/DL (ref 70–99)
GLUCOSE BLDC GLUCOMTR-MCNC: 147 MG/DL (ref 70–99)
GLUCOSE BLDC GLUCOMTR-MCNC: 264 MG/DL (ref 70–99)
GLUCOSE BLDC GLUCOMTR-MCNC: 96 MG/DL (ref 70–99)
HCT VFR BLD AUTO: 23.6 % (ref 40–53)
HGB BLD-MCNC: 7.3 G/DL (ref 13.3–17.7)
HOLD SPECIMEN: NORMAL
IMM GRANULOCYTES # BLD: 0.2 10E3/UL
IMM GRANULOCYTES NFR BLD: 3 %
INR PPP: 1.82 (ref 0.85–1.15)
LYMPHOCYTES # BLD AUTO: 0.2 10E3/UL (ref 0.8–5.3)
LYMPHOCYTES NFR BLD AUTO: 3 %
MAGNESIUM SERPL-MCNC: 1.9 MG/DL (ref 1.7–2.3)
MCH RBC QN AUTO: 29.8 PG (ref 26.5–33)
MCHC RBC AUTO-ENTMCNC: 30.9 G/DL (ref 31.5–36.5)
MCV RBC AUTO: 96 FL (ref 78–100)
MONOCYTES # BLD AUTO: 0.5 10E3/UL (ref 0–1.3)
MONOCYTES NFR BLD AUTO: 6 %
NEUTROPHILS # BLD AUTO: 7.7 10E3/UL (ref 1.6–8.3)
NEUTROPHILS NFR BLD AUTO: 87 %
NRBC # BLD AUTO: 0 10E3/UL
NRBC BLD AUTO-RTO: 0 /100
PHOSPHATE SERPL-MCNC: 2.8 MG/DL (ref 2.5–4.5)
PLATELET # BLD AUTO: 206 10E3/UL (ref 150–450)
POTASSIUM SERPL-SCNC: 3.1 MMOL/L (ref 3.4–5.3)
POTASSIUM SERPL-SCNC: 3.7 MMOL/L (ref 3.4–5.3)
RBC # BLD AUTO: 2.45 10E6/UL (ref 4.4–5.9)
WBC # BLD AUTO: 8.7 10E3/UL (ref 4–11)

## 2023-07-20 PROCEDURE — 250N000012 HC RX MED GY IP 250 OP 636 PS 637: Performed by: NURSE PRACTITIONER

## 2023-07-20 PROCEDURE — 250N000013 HC RX MED GY IP 250 OP 250 PS 637

## 2023-07-20 PROCEDURE — 250N000013 HC RX MED GY IP 250 OP 250 PS 637: Performed by: INTERNAL MEDICINE

## 2023-07-20 PROCEDURE — 97110 THERAPEUTIC EXERCISES: CPT | Mod: GO

## 2023-07-20 PROCEDURE — 84100 ASSAY OF PHOSPHORUS: CPT | Performed by: INTERNAL MEDICINE

## 2023-07-20 PROCEDURE — 250N000012 HC RX MED GY IP 250 OP 636 PS 637: Performed by: INTERNAL MEDICINE

## 2023-07-20 PROCEDURE — 97530 THERAPEUTIC ACTIVITIES: CPT | Mod: GP | Performed by: PHYSICAL THERAPIST

## 2023-07-20 PROCEDURE — 99310 SBSQ NF CARE HIGH MDM 45: CPT | Performed by: INTERNAL MEDICINE

## 2023-07-20 PROCEDURE — 83735 ASSAY OF MAGNESIUM: CPT | Performed by: INTERNAL MEDICINE

## 2023-07-20 PROCEDURE — 36415 COLL VENOUS BLD VENIPUNCTURE: CPT | Performed by: INTERNAL MEDICINE

## 2023-07-20 PROCEDURE — 85025 COMPLETE CBC W/AUTO DIFF WBC: CPT | Performed by: INTERNAL MEDICINE

## 2023-07-20 PROCEDURE — 85610 PROTHROMBIN TIME: CPT | Performed by: INTERNAL MEDICINE

## 2023-07-20 PROCEDURE — 97535 SELF CARE MNGMENT TRAINING: CPT | Mod: GO

## 2023-07-20 PROCEDURE — 84132 ASSAY OF SERUM POTASSIUM: CPT | Performed by: INTERNAL MEDICINE

## 2023-07-20 PROCEDURE — 120N000009 HC R&B SNF

## 2023-07-20 PROCEDURE — 97110 THERAPEUTIC EXERCISES: CPT | Mod: GP | Performed by: PHYSICAL THERAPIST

## 2023-07-20 RX ORDER — POTASSIUM CHLORIDE 750 MG/1
40 TABLET, EXTENDED RELEASE ORAL ONCE
Status: COMPLETED | OUTPATIENT
Start: 2023-07-20 | End: 2023-07-20

## 2023-07-20 RX ADMIN — PREDNISONE 10 MG: 5 TABLET ORAL at 09:36

## 2023-07-20 RX ADMIN — B-COMPLEX W/ C & FOLIC ACID TAB 1 MG 1 TABLET: 1 TAB at 13:15

## 2023-07-20 RX ADMIN — ALLOPURINOL 100 MG: 100 TABLET ORAL at 09:34

## 2023-07-20 RX ADMIN — BUMETANIDE 2 MG: 1 TABLET ORAL at 09:36

## 2023-07-20 RX ADMIN — PANTOPRAZOLE SODIUM 40 MG: 40 TABLET, DELAYED RELEASE ORAL at 06:38

## 2023-07-20 RX ADMIN — SIMETHICONE 80 MG: 80 TABLET, CHEWABLE ORAL at 09:54

## 2023-07-20 RX ADMIN — DICLOFENAC SODIUM 4 G: 10 GEL TOPICAL at 09:41

## 2023-07-20 RX ADMIN — NYSTATIN 500000 UNITS: 500000 SUSPENSION ORAL at 13:14

## 2023-07-20 RX ADMIN — BUMETANIDE 2 MG: 1 TABLET ORAL at 16:38

## 2023-07-20 RX ADMIN — MAGNESIUM OXIDE TAB 400 MG (241.3 MG ELEMENTAL MG) 400 MG: 400 (241.3 MG) TAB at 20:52

## 2023-07-20 RX ADMIN — Medication 12.5 MG: at 06:38

## 2023-07-20 RX ADMIN — POLYETHYLENE GLYCOL 3350 17 G: 17 POWDER, FOR SOLUTION ORAL at 09:37

## 2023-07-20 RX ADMIN — MAGNESIUM OXIDE TAB 400 MG (241.3 MG ELEMENTAL MG) 400 MG: 400 (241.3 MG) TAB at 13:15

## 2023-07-20 RX ADMIN — POTASSIUM CHLORIDE 40 MEQ: 750 TABLET, EXTENDED RELEASE ORAL at 09:37

## 2023-07-20 RX ADMIN — Medication 1.5 MG: at 18:21

## 2023-07-20 RX ADMIN — INSULIN ASPART 4 UNITS: 100 INJECTION, SOLUTION INTRAVENOUS; SUBCUTANEOUS at 18:20

## 2023-07-20 RX ADMIN — TACROLIMUS 3 MG: 1 CAPSULE ORAL at 09:35

## 2023-07-20 RX ADMIN — TACROLIMUS 3 MG: 1 CAPSULE ORAL at 18:21

## 2023-07-20 RX ADMIN — VALGANCICLOVIR HYDROCHLORIDE 450 MG: 450 TABLET ORAL at 20:52

## 2023-07-20 RX ADMIN — MYCOPHENOLIC ACID 540 MG: 180 TABLET, DELAYED RELEASE ORAL at 18:20

## 2023-07-20 RX ADMIN — INSULIN ASPART 1 UNITS: 100 INJECTION, SOLUTION INTRAVENOUS; SUBCUTANEOUS at 13:17

## 2023-07-20 RX ADMIN — PREGABALIN 25 MG: 25 CAPSULE ORAL at 09:36

## 2023-07-20 RX ADMIN — INSULIN ASPART 1 UNITS: 100 INJECTION, SOLUTION INTRAVENOUS; SUBCUTANEOUS at 13:18

## 2023-07-20 RX ADMIN — Medication 12.5 MG: at 13:15

## 2023-07-20 RX ADMIN — MYCOPHENOLIC ACID 540 MG: 180 TABLET, DELAYED RELEASE ORAL at 09:35

## 2023-07-20 RX ADMIN — NYSTATIN 500000 UNITS: 500000 SUSPENSION ORAL at 09:37

## 2023-07-20 RX ADMIN — ASPIRIN 325 MG: 325 TABLET, FILM COATED ORAL at 09:34

## 2023-07-20 RX ADMIN — ATORVASTATIN CALCIUM 20 MG: 10 TABLET, FILM COATED ORAL at 20:51

## 2023-07-20 RX ADMIN — NYSTATIN 500000 UNITS: 500000 SUSPENSION ORAL at 16:38

## 2023-07-20 RX ADMIN — INSULIN ASPART 7 UNITS: 100 INJECTION, SOLUTION INTRAVENOUS; SUBCUTANEOUS at 09:44

## 2023-07-20 RX ADMIN — INSULIN ASPART 5 UNITS: 100 INJECTION, SOLUTION INTRAVENOUS; SUBCUTANEOUS at 18:20

## 2023-07-20 RX ADMIN — NYSTATIN 500000 UNITS: 500000 SUSPENSION ORAL at 20:51

## 2023-07-20 RX ADMIN — Medication 12.5 MG: at 21:01

## 2023-07-20 RX ADMIN — URSODIOL 250 MG: 250 TABLET ORAL at 09:36

## 2023-07-20 RX ADMIN — SULFAMETHOXAZOLE AND TRIMETHOPRIM 1 TABLET: 400; 80 TABLET ORAL at 09:36

## 2023-07-20 RX ADMIN — URSODIOL 250 MG: 250 TABLET ORAL at 20:51

## 2023-07-20 ASSESSMENT — ACTIVITIES OF DAILY LIVING (ADL)
ADLS_ACUITY_SCORE: 33

## 2023-07-20 NOTE — PROGRESS NOTES
" Status at Admission - 6/26/23 Last update - 7/13/23 Current Status 7/20/23   Bed Mobility Min A for rolling with use of bed rail, mod A for supine to sit use of bed rails, increased time, LE and trunk support and coordination unchanged SBA   Transfer Sit to stand: max A x 1, Mod A x 1 for simple standing from elevated bed with FWW, pt struggles to appropriately coordinate task  Max A x 1, Mod A x 1 for simple standing from elevated bed with FWW. Pt is unable to safely coordinate task and hold on to walker. Removed the walker and was able to coordinate partial stand pivot with same assist levels. Premature sit and repositioning needed Pt has made significant progress and is able to squat pivot transfer between surfaces with SBA - min A depending on surface height and fatigue Pt has improved to stand pivot transfer with SBA.   Gait unable Pt has progressed from ambulating in // bars with assist x 2 to walking several bouts of 30 - 50' with A x 2 and wc brought behind using front WW in hallway Pt able to ambulate up to 100' with multiple bouts of this within session, CGA, front WW. Pt has varying stability so for safety second person follows with wc.    Stairs unable Pt has initiated toe tapping on 4\" step with goal of progressing to step ups Pt is able to ascend/descend 3 - 6\" steps with marcos HR and CGA - min A. Pt has initiated step ups to 8\" step with two hands on one handrail requiring mod - max A for weakness leading to knee buckle. Pt needs to perform this for safety at home.   Wheelchair Propulsion dependent dependent dependent         Assessment of Progress Since Last Update: Patient has demonstrated significant gains since last update, most notably the initiation of stairs and requiring less assistance for ambulation. Pt demonstrates improvements day by day with tolerance for stepping up to higher steps with less need for assistance.    Barriers to Progress: Patient has occasional days with nausea but he still " participates fully in therapy   Proposed Solutions to Improve Barriers:  Medical team is addressing nausea.   Justification for Continued Therapy Services: Patient has siginifcant deconditioning that was present leading up to his transplants. . Pt is demonstrating good tolerance and excellent progression with therapy, anticipate within one week patient will demonstrate safety with stairs and be able to ambulate with assist of one only in order to go home with reduced fall risk and reduced risk of rehospitalization.   Additional Considerations for Safe and Efficient Discharge:  will continue to assess.

## 2023-07-20 NOTE — PLAN OF CARE
Goal Outcome Evaluation:            FOCUS/GOAL     Bowel management, Bladder management, Medication management, Wound care management, Mobility, Skin integrity and Safety management     ASSESSMENT, INTERVENTIONS AND CONTINUING PLAN FOR GOAL:     Pt is A&OX4, calm, & cooperative with care. Denied CP, SOB, & n/v.   A of 1 with walker & GB for transfer. R lower forearm PIV patent, NS locked, & dressing CDI. Takes meds whole with thin liquid.   Potassium level is 3.1, magnesium level is 1.9, & phosphorus level is 2.8 per 07/20/23 6:22 AM lab result. All are RN managed, Potassium was replaced  & lab to recheck at 1600. Pt was out to therapy . Able to make needs known & call light within reach. Will continue with POC.     Patient's most recent vital signs are:     BP: 111/70    Resp: 18   T: 98.1   SpO2: 99 on RA

## 2023-07-20 NOTE — PLAN OF CARE
Goal Outcome Evaluation:    FOCUS/GOAL    Bowel management, Bladder management, Medication management, Wound care management, Mobility, Skin integrity and Safety management    ASSESSMENT, INTERVENTIONS AND CONTINUING PLAN FOR GOAL:    Pt is A&OX4, calm, & cooperative with care. Denied CP, SOB, & n/v. On 2L O2 @ HS. A of 1 with walker & GB for transfer. Continent for both B&B; uses BSC. Urinal at the bedside. R lower forearm PIV patent, NS locked, & dressing CDI. Takes meds whole with thin liquid. Potassium level is 3.7, magnesium level is 2.2, & phosphorus level is 2.4 per 07/19/23 lab result. Phosphorus replaced yesterday (07/19/23) & AM lab draws are scheduled for all RN managed replacements. Pt slept well throughout the night & no acute issue. Able to make needs known & call light within reach. Will continue with POC.    Patient's most recent vital signs are:     Vital signs:  BP: 116/69  Temp: 98.2  HR: 99  RR: 18  SpO2: 96 %     Patient does not have new respiratory symptoms.  Patient does not have new sore throat.  Patient does not have a fever greater than 99.5.

## 2023-07-20 NOTE — PROGRESS NOTES
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 Patient also had end-stage renal disease secondary to IgA nephropathy and ANCA vasculitis.     Patient had been hospitalized from 05-Jun-2023 to 25-Jun-2023 for end-stage liver disease and end-stage renal disease. On 06-Jun-2023, patient underwent simultaneous liver and kidney transplant. On 06-Jun-2023, patient returned to the OR with concern for low flow in the renal graft - patient underwent ex-lap, washout, closure with healthy appearing graft with intact arterial and venous flow. Hospital course of was also compliucated by acute kidney injury requiring CRRT and intermittent hemodialysis, right internal jugular clot, atrial fibrillation, hypertension, fluid overload, hypoxemia, anemia, leucocytosis, malnutrition, gout, deconditioning and hyperglycemia secondary to steroids and/or tube feeds. Patient was discharged to TCU on 25-Jun-2023.    On 15-Chris-2023, patient underwent ultrasound of bilateral upper extremities that showed persistent nonocclusive thrombus in the lower right internal jugular vein similar to comparison ultrasound on 03-Mar-2023 and resolved right axillary thrombus.     During hospital stay, patient had been positive for hepatitis B surface antigen on 05-Jun-2023 but was not previously positive on 26-Jan-2023. Hepatitis B virus DNA was negative on 05-Jun-2023 and patient had no clear at risk behaviour. Repeat hepatitis B surface antigen and HBV was not detected. The positive hepatitis B surface antigen on 05-Jun-2023 is suspected to be a false positive.    P:  1.) Liver cirrhosis secondary to alcohol and  hemachromatosis s/p liver transplant; end-stage renal disease secondary to IgA nephropathy and ANCA vasculitis:  - Patient on mycophenolic acid, tacrolimus and prednisone.  - Patient on prophylactic bactrim and valganciclovir.     2.) Acute on chronic anemia:  - Monitoring blood counts.  - Transfusion if Hb is below 7.    3.) Leucocytosis, resolved:  - Monitoring for evidence of infection.    4.) Lower extremity edema:  - Patient on bumex. Monitoring response.    5.) Right internal jugular thrombus:  - Patient on coumadin for anticoagulation with goal INR 1.5-2.0 due to risk of bleeding.  - INR at goal and patient no longer on heparin.    6.) Coronary artery disease:  - Patient on aspirin, atorvastatin and metoprolol.    7.) Paroxysmal atrial fibrillation:  - Patient on metoprolol. Patient already on coumdin for anticoagulation with goal INR 1.5-2.0    8.) Hyperglycemia secondary to steroids, tube feeds or both:  - Patient on lantus 6 units nightly. Patient on novolog 1 unit per 12 gm of carbohydrates with meals. Patient on insulin sliding scale.    9.) Psoriatic arthritis:  - Patient was on prednisone 10 mg daily prior to hospitalization and remains on prednisone 10 mg daily.    10.) Gout:   - Patient on allopurinol.  - Patient to f/u with Rheumatology as scheduled.    11.) Hypokalemia:  - Supplementing.      12.) Alcohol dependence, in remission:  - F/u as outpatient.       Diet: Regular Diet Adult  Snacks/Supplements Adult: Other; Please allow pt/RN to order snacks/supplements PRN; Between Meals    Lux Catheter: Not present  Lines: None     Cardiac Monitoring: None  Code Status: Full Code      Clinically Significant Risk Factors        # Hypokalemia: Lowest K = 3.1 mmol/L in last 2 days, will replace as needed     # Hypomagnesemia: Lowest Mg = 1.5 mg/dL in last 2 days, will replace as needed   # Hypoalbuminemia: Lowest albumin = 2.6 g/dL at 7/7/2023 10:10 AM, will monitor as appropriate     # Hypertension:  Noted on problem list         # Moderate Malnutrition: based on nutrition assessment         Disposition Plan     Expected Discharge Date: 07/27/2023                  Fabain Yancey MD  Hospitalist Service  [facility]  Securely message with [employer] (more info)  Text page via [email or web address] Paging/Directory   ______________________________________________________________________    Interval History     Patient noted feeling well. Patient noted wound on foot healing. Patient noted no new problems.    Physical Exam   Vital Signs: Temp: 98.1  F (36.7  C) Temp src: Oral BP: 111/70 Pulse: 98   Resp: 18 SpO2: 99 % O2 Device: None (Room air)    Weight: 212 lbs 3.2 oz    General: Patient comfortable, NAD.  Abdomen: Incision sites clean, dry, intact.  Extremities: Very small wound just proximal to base of digit 5 of right foot with no erythema or drainage.    Medical Decision Making             Data

## 2023-07-20 NOTE — PROGRESS NOTES
CLINICAL NUTRITION SERVICES - REASSESSMENT NOTE     Nutrition Prescription    RECOMMENDATIONS FOR MDs/PROVIDERS TO ORDER:  None    Malnutrition Status:    Patient does not meet two of the established criteria necessary for diagnosing malnutrition    Recommendations already ordered by Registered Dietitian (RD):  Nutrition education - magnesium and phosphorus foods.    Future/Additional Recommendations:  Monitor labs, intakes, and weight trends.     EVALUATION OF THE PROGRESS TOWARD GOALS   Diet: Regular  Intake/[place]: 100% of documented meals.  Snacks/supplements: PRN    Pt ordering (on average) 2195 kcal and 86 g protein per day per [employer]. With documented intakes, pt is likely meeting minimum energy and >75% protein needs.     NEW FINDINGS/REVIEW OF SYSTEMS    Nutrition/GI: RD met with pt and wife at bedside. Pt with good appetite/intakes. Pt and wife wondering about high magnesium and phosphorus foods given pt's labs have been low. Provided with handouts on high magnesium and phosphorus foods and discussed these options. Encouraged oral intakes, discussed ability for pt to use oral nutrition supplements or electrolyte beverages to help maintain electrolyte levels.     Weights: Pt with limited weight history prior to admission,with  10 lb (4%) weight loss over 3 months, at similar weight to 5 months ago.    Wt Readings from Last Encounters:   07/14/23 96.3 kg (212 lb 3.2 oz)   07/10/23 96.8 kg (213 lb 4.8 oz)   07/08/23 96.3 kg (212 lb 4.9 oz)   07/01/23 95.8 kg (211 lb 3.2 oz)   06/25/23 98.5 kg (217 lb 2.5 oz)   05/19/23 103.4 kg (228 lb)   05/17/23 104.3 kg (230 lb)   05/11/23 99.8 kg (220 lb)   05/10/23 106.9 kg (235 lb 9.6 oz)   05/01/23 102 kg (224 lb 12.8 oz)   04/27/23 98.9 kg (218 lb)   04/19/23 100.8 kg (222 lb 5 oz)   04/10/23 99 kg (218 lb 3.2 oz)   03/21/23 101.1 kg (222 lb 14.4 oz)   03/09/23 98.4 kg (217 lb)   03/03/23 99.7 kg (219 lb 11.2 oz)   03/01/23 99.8 kg (220 lb)   02/17/23 99.8 kg  (220 lb)   02/09/23 96.5 kg (212 lb 11.9 oz)   02/09/23 96.2 kg (212 lb)   02/06/23 94.8 kg (209 lb 1 oz)   02/01/23 96.6 kg (213 lb)     Skin: surgical incisions     Labs reviewed   07/17/23 16:05 07/18/23 06:23 07/18/23 20:54 07/19/23 05:55 07/19/23 16:34 07/20/23 06:22   Sodium    138     Potassium  3.4 4.3 3.7  3.1 (L)   Urea Nitrogen    32.1 (H)     Creatinine    1.15     Magnesium 2.4 (H) 1.9  1.5 (L) 2.2 1.9   Phosphorus  2.3 (L)  2.4 (L)  2.8   Albumin    2.9 (L)     Protein Total    5.2 (L)     Alkaline Phosphatase    236 (H)     Glucose    95       Medications reviewed: bumex, insulin (novolog, lantus), mag ox, mag sulfate, renal multivitamin, mycophenolate, protonix, miralax, neutra-phos, potassium chloride,  prednisone, bactrim, tacrolimus, valgancyclovir, warfarin, simethicone PRN    MALNUTRITION  % Intake: No decreased intake noted  % Weight Loss: Weight loss does not meet criteria for malnutrition   Subcutaneous Fat Loss: None observed   Muscle Loss: Lower arm  (forearm):  moderate and Posterior calf:  moderate  Fluid Accumulation/Edema: Moderate  Malnutrition Diagnosis: Patient does not meet two of the established criteria necessary for diagnosing malnutrition but is at risk for malnutrition    Previous Goals   Patient to consume % of nutritionally adequate meal trays TID, or the equivalent with supplements/snacks.  Evaluation: Met    Previous Nutrition Diagnosis  Predicted inadequate protein-energy intake related to variable appetite as evidenced by pt reliant on PO intakes to meet 100% of nutritional needs with potential for variation  Evaluation: No change    CURRENT NUTRITION DIAGNOSIS  Predicted inadequate protein-energy intake related to variable appetite as evidenced by pt reliant on PO intakes to meet 100% of nutritional needs with potential for variation    INTERVENTIONS  Implementation  Nutrition education for nutrition relationship to health/disease     Goals  Patient to consume  % of nutritionally adequate meal trays TID, or the equivalent with supplements/snacks.    Monitoring/Evaluation  Progress toward goals will be monitored and evaluated per protocol.    Shaunna Piper MS, LOLA, LD  RD pager: [phone]  [facility] Weekend/Holiday Pager: [phone]

## 2023-07-20 NOTE — PROGRESS NOTES
Weekly Update     07/20/23 2133   Appointment Info   Signing Clinician's Name / Credentials (OT) KRIS Duarte   Self-Care/Home Management   Self-Care/Home Mgmt/ADL, Compensatory, Meal Prep Minutes (92040) 20   Treatment Detail/Skilled Intervention OT: Tub/shower transfer simulating home set-up with extended tub bench (pt/wife will purchase) and grab bars(pt has suction cup grab bars). Pt required CGA and followed instruction for sit and swivel method (stepping in/out is min A due to LLE weakness).  Wfie present to observe/assist and she showed th pictures of home bathroom.   OT Discharge Planning   OT Plan OT: Activity tolerance in standing, transfers, try some light kitchen mobility.   OT Brief overview of current status Pt reports stomach upset today, however, ood progress evidenced by tub transfers with as little as CGA.  Wife can give pt CGA for extended tub bench transfer after discharge.  Pt and wife are purchasing needed AE for home, eg ETB. Con't tx per updated POC.   Total Session Time   Timed Code Treatment Minutes 20   Total Session Time (sum of timed and untimed services) 20   Post Acute Settings Only   What unit is patient on? TCU     Pt is making steady progress and tasks are graded with more difficulty as pt improves.  He con't to be below his baseline and is assist of one for cares and mobility.  Th provides set-up and up to min A for ADL and mobility with cues to slow down and increase safety.  Wife and pt are working on his AE needs for home (eg Extended Tub Bench) and wife is frequently present to observe or educated for assisting pt with hands on cares. Recommend ongoing OT as pt is progressing and shows potential to gain IND with goals of mod I for ADL/mobility.

## 2023-07-21 ENCOUNTER — TELEPHONE (OUTPATIENT)
Dept: TRANSPLANT | Facility: CLINIC | Age: 68
End: 2023-07-21
Payer: COMMERCIAL

## 2023-07-21 ENCOUNTER — APPOINTMENT (OUTPATIENT)
Dept: PHYSICAL THERAPY | Facility: SKILLED NURSING FACILITY | Age: 68
End: 2023-07-21
Attending: INTERNAL MEDICINE
Payer: COMMERCIAL

## 2023-07-21 ENCOUNTER — APPOINTMENT (OUTPATIENT)
Dept: OCCUPATIONAL THERAPY | Facility: SKILLED NURSING FACILITY | Age: 68
End: 2023-07-21
Attending: INTERNAL MEDICINE
Payer: COMMERCIAL

## 2023-07-21 DIAGNOSIS — Z94.0 KIDNEY REPLACED BY TRANSPLANT: ICD-10-CM

## 2023-07-21 DIAGNOSIS — Z94.4 LIVER REPLACED BY TRANSPLANT (H): Primary | ICD-10-CM

## 2023-07-21 LAB
ALBUMIN SERPL BCG-MCNC: 3 G/DL (ref 3.5–5.2)
ALP SERPL-CCNC: 210 U/L (ref 40–129)
ALT SERPL W P-5'-P-CCNC: 34 U/L (ref 0–70)
ANION GAP SERPL CALCULATED.3IONS-SCNC: 11 MMOL/L (ref 7–15)
ANION GAP SERPL CALCULATED.3IONS-SCNC: 11 MMOL/L (ref 7–15)
AST SERPL W P-5'-P-CCNC: 21 U/L (ref 0–45)
BILIRUB SERPL-MCNC: 0.5 MG/DL
BUN SERPL-MCNC: 25.9 MG/DL (ref 8–23)
BUN SERPL-MCNC: 27.5 MG/DL (ref 8–23)
CALCIUM SERPL-MCNC: 8.6 MG/DL (ref 8.8–10.2)
CALCIUM SERPL-MCNC: 8.7 MG/DL (ref 8.8–10.2)
CHLORIDE SERPL-SCNC: 94 MMOL/L (ref 98–107)
CHLORIDE SERPL-SCNC: 94 MMOL/L (ref 98–107)
CREAT SERPL-MCNC: 1.05 MG/DL (ref 0.67–1.17)
CREAT SERPL-MCNC: 1.12 MG/DL (ref 0.67–1.17)
DEPRECATED HCO3 PLAS-SCNC: 28 MMOL/L (ref 22–29)
DEPRECATED HCO3 PLAS-SCNC: 30 MMOL/L (ref 22–29)
GFR SERPL CREATININE-BSD FRML MDRD: 73 ML/MIN/1.73M2
GFR SERPL CREATININE-BSD FRML MDRD: 79 ML/MIN/1.73M2
GLUCOSE BLDC GLUCOMTR-MCNC: 107 MG/DL (ref 70–99)
GLUCOSE BLDC GLUCOMTR-MCNC: 128 MG/DL (ref 70–99)
GLUCOSE BLDC GLUCOMTR-MCNC: 207 MG/DL (ref 70–99)
GLUCOSE SERPL-MCNC: 103 MG/DL (ref 70–99)
GLUCOSE SERPL-MCNC: 209 MG/DL (ref 70–99)
HOLD SPECIMEN: NORMAL
INR PPP: 1.92 (ref 0.85–1.15)
MAGNESIUM SERPL-MCNC: 1.5 MG/DL (ref 1.7–2.3)
MAGNESIUM SERPL-MCNC: 2.8 MG/DL (ref 1.7–2.3)
PHOSPHATE SERPL-MCNC: 2.6 MG/DL (ref 2.5–4.5)
POTASSIUM SERPL-SCNC: 2.9 MMOL/L (ref 3.4–5.3)
POTASSIUM SERPL-SCNC: 4.3 MMOL/L (ref 3.4–5.3)
POTASSIUM SERPL-SCNC: 4.3 MMOL/L (ref 3.4–5.3)
PROT SERPL-MCNC: 5.4 G/DL (ref 6.4–8.3)
SODIUM SERPL-SCNC: 133 MMOL/L (ref 136–145)
SODIUM SERPL-SCNC: 135 MMOL/L (ref 136–145)
TACROLIMUS BLD-MCNC: 5.3 UG/L (ref 5–15)
TME LAST DOSE: NORMAL H
TME LAST DOSE: NORMAL H

## 2023-07-21 PROCEDURE — 84132 ASSAY OF SERUM POTASSIUM: CPT | Performed by: INTERNAL MEDICINE

## 2023-07-21 PROCEDURE — 120N000009 HC R&B SNF

## 2023-07-21 PROCEDURE — 36415 COLL VENOUS BLD VENIPUNCTURE: CPT | Performed by: INTERNAL MEDICINE

## 2023-07-21 PROCEDURE — 250N000012 HC RX MED GY IP 250 OP 636 PS 637: Performed by: NURSE PRACTITIONER

## 2023-07-21 PROCEDURE — 250N000013 HC RX MED GY IP 250 OP 250 PS 637: Performed by: INTERNAL MEDICINE

## 2023-07-21 PROCEDURE — 97110 THERAPEUTIC EXERCISES: CPT | Mod: GP | Performed by: PHYSICAL THERAPIST

## 2023-07-21 PROCEDURE — 250N000011 HC RX IP 250 OP 636: Mod: JZ | Performed by: INTERNAL MEDICINE

## 2023-07-21 PROCEDURE — 80053 COMPREHEN METABOLIC PANEL: CPT | Performed by: INTERNAL MEDICINE

## 2023-07-21 PROCEDURE — 82947 ASSAY GLUCOSE BLOOD QUANT: CPT | Performed by: INTERNAL MEDICINE

## 2023-07-21 PROCEDURE — 250N000013 HC RX MED GY IP 250 OP 250 PS 637

## 2023-07-21 PROCEDURE — 85610 PROTHROMBIN TIME: CPT | Performed by: INTERNAL MEDICINE

## 2023-07-21 PROCEDURE — 250N000012 HC RX MED GY IP 250 OP 636 PS 637: Performed by: INTERNAL MEDICINE

## 2023-07-21 PROCEDURE — 84100 ASSAY OF PHOSPHORUS: CPT | Performed by: INTERNAL MEDICINE

## 2023-07-21 PROCEDURE — 97535 SELF CARE MNGMENT TRAINING: CPT | Mod: GO

## 2023-07-21 PROCEDURE — 80197 ASSAY OF TACROLIMUS: CPT | Performed by: INTERNAL MEDICINE

## 2023-07-21 PROCEDURE — 97530 THERAPEUTIC ACTIVITIES: CPT | Mod: GO

## 2023-07-21 PROCEDURE — 83735 ASSAY OF MAGNESIUM: CPT | Performed by: INTERNAL MEDICINE

## 2023-07-21 RX ORDER — POTASSIUM CHLORIDE 750 MG/1
20 TABLET, EXTENDED RELEASE ORAL ONCE
Status: COMPLETED | OUTPATIENT
Start: 2023-07-21 | End: 2023-07-21

## 2023-07-21 RX ORDER — POTASSIUM CHLORIDE 750 MG/1
40 TABLET, EXTENDED RELEASE ORAL ONCE
Status: COMPLETED | OUTPATIENT
Start: 2023-07-21 | End: 2023-07-21

## 2023-07-21 RX ORDER — MAGNESIUM SULFATE HEPTAHYDRATE 40 MG/ML
4 INJECTION, SOLUTION INTRAVENOUS ONCE
Status: COMPLETED | OUTPATIENT
Start: 2023-07-21 | End: 2023-07-21

## 2023-07-21 RX ORDER — POTASSIUM CHLORIDE 750 MG/1
40 TABLET, EXTENDED RELEASE ORAL 2 TIMES DAILY
Status: DISCONTINUED | OUTPATIENT
Start: 2023-07-21 | End: 2023-07-27 | Stop reason: HOSPADM

## 2023-07-21 RX ADMIN — TACROLIMUS 3 MG: 1 CAPSULE ORAL at 08:24

## 2023-07-21 RX ADMIN — Medication 1.5 MG: at 18:57

## 2023-07-21 RX ADMIN — INSULIN ASPART 1 UNITS: 100 INJECTION, SOLUTION INTRAVENOUS; SUBCUTANEOUS at 22:19

## 2023-07-21 RX ADMIN — INSULIN ASPART 7 UNITS: 100 INJECTION, SOLUTION INTRAVENOUS; SUBCUTANEOUS at 13:17

## 2023-07-21 RX ADMIN — MAGNESIUM SULFATE HEPTAHYDRATE 4 G: 40 INJECTION, SOLUTION INTRAVENOUS at 11:32

## 2023-07-21 RX ADMIN — INSULIN ASPART 11 UNITS: 100 INJECTION, SOLUTION INTRAVENOUS; SUBCUTANEOUS at 18:59

## 2023-07-21 RX ADMIN — MAGNESIUM OXIDE TAB 400 MG (241.3 MG ELEMENTAL MG) 400 MG: 400 (241.3 MG) TAB at 13:17

## 2023-07-21 RX ADMIN — NYSTATIN 500000 UNITS: 500000 SUSPENSION ORAL at 18:57

## 2023-07-21 RX ADMIN — POLYETHYLENE GLYCOL 3350 17 G: 17 POWDER, FOR SOLUTION ORAL at 08:22

## 2023-07-21 RX ADMIN — ATORVASTATIN CALCIUM 20 MG: 10 TABLET, FILM COATED ORAL at 20:18

## 2023-07-21 RX ADMIN — VALGANCICLOVIR HYDROCHLORIDE 450 MG: 450 TABLET ORAL at 20:18

## 2023-07-21 RX ADMIN — PREDNISONE 10 MG: 5 TABLET ORAL at 08:24

## 2023-07-21 RX ADMIN — PANTOPRAZOLE SODIUM 40 MG: 40 TABLET, DELAYED RELEASE ORAL at 06:21

## 2023-07-21 RX ADMIN — INSULIN ASPART 12 UNITS: 100 INJECTION, SOLUTION INTRAVENOUS; SUBCUTANEOUS at 08:21

## 2023-07-21 RX ADMIN — SIMETHICONE 80 MG: 80 TABLET, CHEWABLE ORAL at 08:24

## 2023-07-21 RX ADMIN — B-COMPLEX W/ C & FOLIC ACID TAB 1 MG 1 TABLET: 1 TAB at 13:17

## 2023-07-21 RX ADMIN — TACROLIMUS 3 MG: 1 CAPSULE ORAL at 20:18

## 2023-07-21 RX ADMIN — DICLOFENAC SODIUM 4 G: 10 GEL TOPICAL at 20:19

## 2023-07-21 RX ADMIN — POTASSIUM CHLORIDE 40 MEQ: 750 TABLET, EXTENDED RELEASE ORAL at 10:10

## 2023-07-21 RX ADMIN — POTASSIUM CHLORIDE 40 MEQ: 750 TABLET, EXTENDED RELEASE ORAL at 20:18

## 2023-07-21 RX ADMIN — ASPIRIN 325 MG: 325 TABLET, FILM COATED ORAL at 08:24

## 2023-07-21 RX ADMIN — MYCOPHENOLIC ACID 540 MG: 180 TABLET, DELAYED RELEASE ORAL at 18:57

## 2023-07-21 RX ADMIN — MYCOPHENOLIC ACID 540 MG: 180 TABLET, DELAYED RELEASE ORAL at 08:23

## 2023-07-21 RX ADMIN — NYSTATIN 500000 UNITS: 500000 SUSPENSION ORAL at 08:23

## 2023-07-21 RX ADMIN — MAGNESIUM OXIDE TAB 400 MG (241.3 MG ELEMENTAL MG) 400 MG: 400 (241.3 MG) TAB at 20:18

## 2023-07-21 RX ADMIN — PREGABALIN 25 MG: 25 CAPSULE ORAL at 08:24

## 2023-07-21 RX ADMIN — ALLOPURINOL 100 MG: 100 TABLET ORAL at 08:24

## 2023-07-21 RX ADMIN — HYDROCORTISONE: 1 CREAM TOPICAL at 20:19

## 2023-07-21 RX ADMIN — INSULIN ASPART 3 UNITS: 100 INJECTION, SOLUTION INTRAVENOUS; SUBCUTANEOUS at 19:02

## 2023-07-21 RX ADMIN — BUMETANIDE 2 MG: 1 TABLET ORAL at 18:57

## 2023-07-21 RX ADMIN — NYSTATIN 500000 UNITS: 500000 SUSPENSION ORAL at 20:18

## 2023-07-21 RX ADMIN — POTASSIUM CHLORIDE 20 MEQ: 750 TABLET, EXTENDED RELEASE ORAL at 12:08

## 2023-07-21 RX ADMIN — SULFAMETHOXAZOLE AND TRIMETHOPRIM 1 TABLET: 400; 80 TABLET ORAL at 08:24

## 2023-07-21 RX ADMIN — URSODIOL 250 MG: 250 TABLET ORAL at 08:24

## 2023-07-21 RX ADMIN — NYSTATIN 500000 UNITS: 500000 SUSPENSION ORAL at 13:17

## 2023-07-21 RX ADMIN — Medication 12.5 MG: at 06:20

## 2023-07-21 RX ADMIN — URSODIOL 250 MG: 250 TABLET ORAL at 20:18

## 2023-07-21 ASSESSMENT — ACTIVITIES OF DAILY LIVING (ADL)
ADLS_ACUITY_SCORE: 32
ADLS_ACUITY_SCORE: 33
ADLS_ACUITY_SCORE: 32
ADLS_ACUITY_SCORE: 33
ADLS_ACUITY_SCORE: 32
ADLS_ACUITY_SCORE: 33

## 2023-07-21 NOTE — PLAN OF CARE
Care Coordination:     Writer sent referrals to all the home care agencies available on medicare.gov website for patient's living area. There were only 8 available agencies listed. See below.     [facility]- Referral sent.   [facility]: Referral sent.   [facility]: Referral Sent.   [facility] home care- Do not service [facility]: Do not have therapies available in patient's living area.   [facility]: Not in network with [facility] insurance.   [facility]: Do not service patient's living area in [place].   [facility]: Do no service patients living area.     [facility], [facility], [facility] referrals are pending. It is likely that patient will have to utilize OP services for PT/OT due to limited HC agencies in patients region.     Addendum:     [facility]: Due to insurance plan. They require a prior authorization before starting home care services. Requesting Face to Face and Orders for home care services. Writer will obtain and send Monday to HC agency. Authorization and acceptance pending.   [facility]: unable to accept- do not service [place].     Lizbeth Vasquez   Patient Care Management Coordinator  Acute Rehabilitation Unit/ Transitional Care Unit.   Ph: [phone]

## 2023-07-21 NOTE — TELEPHONE ENCOUNTER
Apoorva called to give Casey's correct number to whoever had called her. Informed her it was probably scheduling, will pass number to them.    Set up time while on the phone with Apoorva to go over discharge teaching next Wednesday 7/26 at 1 pm.

## 2023-07-21 NOTE — PLAN OF CARE
Goal Outcome Evaluation:    Pt is alert and oriented x 4. Able to make his needs known. Pt denied SOB, chest pain, and N/V. Pt is assist of 1 walker and gait belt. Continent of both bowel and bladder. Use urinal at bedside and within reach.PIV on right forearm and intact. Takes med whole with thin liquid. Potassium level was 3.1 with redraw of 3.7. No further action with electrolytes until tomorrow morning. Pt is blood glucose check and carb count with bedtime blood sugar of 96. Insulin given as ordered and sliding scale. Continue to monitor and evaluate per plan of care.    Patient's most recent vital signs are:     Vital signs:  BP: 100/59  Temp: 98.6  HR: 104  RR: 18  SpO2: 97 %     Patient does not have new respiratory symptoms.  Patient does not have new sore throat.  Patient does not have a fever greater than 99.5.

## 2023-07-21 NOTE — TELEPHONE ENCOUNTER
[facility] Call Center    Phone Message    May a detailed message be left on voicemail: yes     Reason for Call: Other: patient calling in stating he was told to call us to schedule a follow up with Etienne. Per chart review there is no active orders for patient to be scheduled currently. Please review and advise. Patient stated he will be here on Aug 1st and would appreciate it if we are able to coordinate the appointment on that day for him.    Action Taken: Other: RNCC    Travel Screening: Not Applicable

## 2023-07-21 NOTE — PLAN OF CARE
Goal Outcome Evaluation:    VS: BP 97/63   Pulse 109   Temp 98.1  F (36.7  C) (Oral)   Resp 18   Wt 98.7 kg (217 lb 8 oz)   SpO2 97%   BMI 34.07 kg/m     O2: RA   Output: Voids in toilet   Last BM: 7/21 in toilet   Activity: Therapy; up in recliner, tv, radio   Skin: X;  R PIV  Abdominal incisions  R foot gout   Pain: Denies    CMS:  Neuro: Some N/T to BLE  A&O, makes needs known   Dressing: R PIV CDI   Diet: Reg, CHO ct, good appetite   and 128   LDA: R PIV CDI, infused Mg+ w/ no complications   Equipment: WW, GB, IV pole, pump   Plan: Con't POC.    Additional Info: Mg+ 1.5 - IV replacement given. Lab redraw set for 1630.  K+ 2.9 - PO replacement given. Lab redraw set for 1630.     Patient's most recent vital signs are:     Vital signs:  BP: 97/63  Temp: 98.1  HR: 109  RR: 18  SpO2: 97 %     Patient does not have new respiratory symptoms.  Patient does not have new sore throat.  Patient does not have a fever greater than 99.5.

## 2023-07-21 NOTE — PLAN OF CARE
Goal Outcome Evaluation:    FOCUS/GOAL    Bowel management, Bladder management, Medication management, Wound care management, Mobility, Skin integrity and Safety management    ASSESSMENT, INTERVENTIONS AND CONTINUING PLAN FOR GOAL:    Pt is A&OX4, calm, & cooperative with care. Denied CP, SOB, & n/v. On 2L O2 at night. A of 1 with walker & GB for transfer. Pt spent the whole shift on a recliner. Continent for both B&B; uses BSC. Urinal at the bedside. R lower forearm PIV patent, NS locked, & dressing CDI. Takes meds whole with thin liquid. Potassium level is 3.7, magnesium level is 1.9, & phosphorus level is 2.8 per 07/20/23 lab result; AM lab draw scheduled. Pt slept well throughout the night & no acute issue. Able to make needs known & call light within reach. Will continue with plan of care.    Patient's most recent vital signs are:     Vital signs:  BP: 114/75  Temp: 98.6  HR: 98  RR: 18  SpO2: 97 %     Patient does not have new respiratory symptoms.  Patient does not have new sore throat.  Patient does not have a fever greater than 99.5.

## 2023-07-22 ENCOUNTER — APPOINTMENT (OUTPATIENT)
Dept: OCCUPATIONAL THERAPY | Facility: SKILLED NURSING FACILITY | Age: 68
End: 2023-07-22
Attending: INTERNAL MEDICINE
Payer: COMMERCIAL

## 2023-07-22 LAB
GLUCOSE BLDC GLUCOMTR-MCNC: 102 MG/DL (ref 70–99)
GLUCOSE BLDC GLUCOMTR-MCNC: 149 MG/DL (ref 70–99)
GLUCOSE BLDC GLUCOMTR-MCNC: 149 MG/DL (ref 70–99)
GLUCOSE BLDC GLUCOMTR-MCNC: 233 MG/DL (ref 70–99)
INR PPP: 2.03 (ref 0.85–1.15)
MAGNESIUM SERPL-MCNC: 1.9 MG/DL (ref 1.7–2.3)
PHOSPHATE SERPL-MCNC: 1.9 MG/DL (ref 2.5–4.5)
PHOSPHATE SERPL-MCNC: 3.3 MG/DL (ref 2.5–4.5)
POTASSIUM SERPL-SCNC: 3.7 MMOL/L (ref 3.4–5.3)

## 2023-07-22 PROCEDURE — 120N000009 HC R&B SNF

## 2023-07-22 PROCEDURE — 99207 PR NO BILLABLE SERVICE THIS VISIT: CPT | Performed by: INTERNAL MEDICINE

## 2023-07-22 PROCEDURE — 84100 ASSAY OF PHOSPHORUS: CPT | Performed by: INTERNAL MEDICINE

## 2023-07-22 PROCEDURE — 250N000013 HC RX MED GY IP 250 OP 250 PS 637: Performed by: INTERNAL MEDICINE

## 2023-07-22 PROCEDURE — 250N000012 HC RX MED GY IP 250 OP 636 PS 637: Performed by: INTERNAL MEDICINE

## 2023-07-22 PROCEDURE — 250N000013 HC RX MED GY IP 250 OP 250 PS 637

## 2023-07-22 PROCEDURE — 250N000012 HC RX MED GY IP 250 OP 636 PS 637: Performed by: NURSE PRACTITIONER

## 2023-07-22 PROCEDURE — 85610 PROTHROMBIN TIME: CPT | Performed by: INTERNAL MEDICINE

## 2023-07-22 PROCEDURE — 258N000003 HC RX IP 258 OP 636: Performed by: INTERNAL MEDICINE

## 2023-07-22 PROCEDURE — 250N000009 HC RX 250: Performed by: INTERNAL MEDICINE

## 2023-07-22 PROCEDURE — 36415 COLL VENOUS BLD VENIPUNCTURE: CPT | Performed by: INTERNAL MEDICINE

## 2023-07-22 PROCEDURE — 97110 THERAPEUTIC EXERCISES: CPT | Mod: GO

## 2023-07-22 PROCEDURE — 84132 ASSAY OF SERUM POTASSIUM: CPT | Performed by: INTERNAL MEDICINE

## 2023-07-22 PROCEDURE — 83735 ASSAY OF MAGNESIUM: CPT | Performed by: INTERNAL MEDICINE

## 2023-07-22 RX ADMIN — MYCOPHENOLIC ACID 540 MG: 180 TABLET, DELAYED RELEASE ORAL at 09:48

## 2023-07-22 RX ADMIN — ASPIRIN 325 MG: 325 TABLET, FILM COATED ORAL at 09:48

## 2023-07-22 RX ADMIN — ATORVASTATIN CALCIUM 20 MG: 10 TABLET, FILM COATED ORAL at 21:29

## 2023-07-22 RX ADMIN — NYSTATIN 500000 UNITS: 500000 SUSPENSION ORAL at 17:29

## 2023-07-22 RX ADMIN — MYCOPHENOLIC ACID 540 MG: 180 TABLET, DELAYED RELEASE ORAL at 17:29

## 2023-07-22 RX ADMIN — DICLOFENAC SODIUM 4 G: 10 GEL TOPICAL at 21:44

## 2023-07-22 RX ADMIN — MAGNESIUM OXIDE TAB 400 MG (241.3 MG ELEMENTAL MG) 400 MG: 400 (241.3 MG) TAB at 12:39

## 2023-07-22 RX ADMIN — POLYETHYLENE GLYCOL 3350 17 G: 17 POWDER, FOR SOLUTION ORAL at 09:47

## 2023-07-22 RX ADMIN — PANTOPRAZOLE SODIUM 40 MG: 40 TABLET, DELAYED RELEASE ORAL at 05:54

## 2023-07-22 RX ADMIN — TACROLIMUS 3 MG: 1 CAPSULE ORAL at 09:48

## 2023-07-22 RX ADMIN — Medication 1.5 MG: at 17:29

## 2023-07-22 RX ADMIN — NYSTATIN 500000 UNITS: 500000 SUSPENSION ORAL at 21:32

## 2023-07-22 RX ADMIN — URSODIOL 250 MG: 250 TABLET ORAL at 09:48

## 2023-07-22 RX ADMIN — Medication 12.5 MG: at 05:54

## 2023-07-22 RX ADMIN — VALGANCICLOVIR HYDROCHLORIDE 450 MG: 450 TABLET ORAL at 21:30

## 2023-07-22 RX ADMIN — INSULIN ASPART 4 UNITS: 100 INJECTION, SOLUTION INTRAVENOUS; SUBCUTANEOUS at 18:46

## 2023-07-22 RX ADMIN — NYSTATIN 500000 UNITS: 500000 SUSPENSION ORAL at 14:20

## 2023-07-22 RX ADMIN — INSULIN ASPART 5 UNITS: 100 INJECTION, SOLUTION INTRAVENOUS; SUBCUTANEOUS at 14:19

## 2023-07-22 RX ADMIN — B-COMPLEX W/ C & FOLIC ACID TAB 1 MG 1 TABLET: 1 TAB at 12:39

## 2023-07-22 RX ADMIN — NYSTATIN 500000 UNITS: 500000 SUSPENSION ORAL at 09:49

## 2023-07-22 RX ADMIN — TACROLIMUS 3 MG: 1 CAPSULE ORAL at 17:28

## 2023-07-22 RX ADMIN — POTASSIUM CHLORIDE 40 MEQ: 750 TABLET, EXTENDED RELEASE ORAL at 21:31

## 2023-07-22 RX ADMIN — PREGABALIN 25 MG: 25 CAPSULE ORAL at 09:49

## 2023-07-22 RX ADMIN — INSULIN ASPART 6 UNITS: 100 INJECTION, SOLUTION INTRAVENOUS; SUBCUTANEOUS at 18:45

## 2023-07-22 RX ADMIN — BUMETANIDE 2 MG: 1 TABLET ORAL at 17:27

## 2023-07-22 RX ADMIN — SULFAMETHOXAZOLE AND TRIMETHOPRIM 1 TABLET: 400; 80 TABLET ORAL at 09:48

## 2023-07-22 RX ADMIN — Medication 12.5 MG: at 21:31

## 2023-07-22 RX ADMIN — POTASSIUM CHLORIDE 40 MEQ: 750 TABLET, EXTENDED RELEASE ORAL at 09:48

## 2023-07-22 RX ADMIN — SODIUM PHOSPHATE, MONOBASIC, MONOHYDRATE AND SODIUM PHOSPHATE, DIBASIC, ANHYDROUS 15 MMOL: 276; 142 INJECTION, SOLUTION INTRAVENOUS at 11:27

## 2023-07-22 RX ADMIN — PREDNISONE 10 MG: 5 TABLET ORAL at 09:48

## 2023-07-22 RX ADMIN — INSULIN ASPART 1 UNITS: 100 INJECTION, SOLUTION INTRAVENOUS; SUBCUTANEOUS at 14:19

## 2023-07-22 RX ADMIN — BUMETANIDE 2 MG: 1 TABLET ORAL at 09:48

## 2023-07-22 RX ADMIN — URSODIOL 250 MG: 250 TABLET ORAL at 21:30

## 2023-07-22 RX ADMIN — INSULIN ASPART 10 UNITS: 100 INJECTION, SOLUTION INTRAVENOUS; SUBCUTANEOUS at 09:45

## 2023-07-22 RX ADMIN — HYDROCORTISONE: 1 CREAM TOPICAL at 21:43

## 2023-07-22 RX ADMIN — MAGNESIUM OXIDE TAB 400 MG (241.3 MG ELEMENTAL MG) 400 MG: 400 (241.3 MG) TAB at 21:29

## 2023-07-22 RX ADMIN — SODIUM PHOSPHATE, MONOBASIC, MONOHYDRATE AND SODIUM PHOSPHATE, DIBASIC, ANHYDROUS 15 MMOL: 276; 142 INJECTION, SOLUTION INTRAVENOUS at 10:34

## 2023-07-22 RX ADMIN — ALLOPURINOL 100 MG: 100 TABLET ORAL at 09:49

## 2023-07-22 ASSESSMENT — ACTIVITIES OF DAILY LIVING (ADL)
ADLS_ACUITY_SCORE: 35
ADLS_ACUITY_SCORE: 34
ADLS_ACUITY_SCORE: 34
ADLS_ACUITY_SCORE: 32
ADLS_ACUITY_SCORE: 34
ADLS_ACUITY_SCORE: 35
ADLS_ACUITY_SCORE: 34
ADLS_ACUITY_SCORE: 35
ADLS_ACUITY_SCORE: 35
ADLS_ACUITY_SCORE: 34
ADLS_ACUITY_SCORE: 35
ADLS_ACUITY_SCORE: 34

## 2023-07-22 NOTE — PLAN OF CARE
Goal Outcome Evaluation:  No acute issues overnight. Appears sleeping well in recliner tonight. Has no c/o's pain, discomfort, CP/SOB or any other c/o's as of yet. Continent using urinal indep.- staff emptied. On standard K+ / Mg.and Phos.replacement - labs ordered this AM per protocol.         Patient's most recent vital signs are:     Vital signs:  BP: 97/63  Temp: 98.1  HR: 103  RR: 18  SpO2: 97 %     Patient does not have new respiratory symptoms.  Patient does not have new sore throat.  Patient does not have a fever greater than 99.5.

## 2023-07-22 NOTE — PLAN OF CARE
Goal Outcome Evaluation:      Plan of Care Reviewed With: patient, spouse    Overall Patient Progress: improvingOverall Patient Progress: improving      VS: /64 (BP Location: Right arm)   Pulse 103   Temp 98.3  F (36.8  C) (Oral)   Resp 18   Wt 98.7 kg (217 lb 8 oz)   SpO2 98%   BMI 34.07 kg/m      O2: RA   Output: Voids in toilet   Last BM: 7/21 in toilet   Activity: Therapy; up in recliner, tv, radio  Spouse visiting; shaved pt's face w/ electric shaver.    Skin: X;  R PIV  Abdominal incisions  R foot gout  R elbow    Pain: Denies    CMS:  Neuro: Some N/T to BLE  A&O, makes needs known   Dressing: R PIV CDI  Abdominal incisions EDUARDO; X RLQ incision w/ Mepilex  R foot w/ gout  R elbow    Diet: Reg, CHO ct, good appetite   and 149   LDA: R PIV CDI, infused Ph+ w/ no complications   Equipment: WW, GB, IV pole, pump   Plan: Con't POC.    Additional Info: K+ 3.7 & Mg+ 1.9 - no replacement needed.   Ph+ 1.9 IV replacement given; 2 bags.   Lab redraw 2-4 H post infusion.      Patient's most recent vital signs are:     Vital signs:  BP: 109/64  Temp: 98.3  HR: 103  RR: 18  SpO2: 98 %     Patient does not have new respiratory symptoms.  Patient does not have new sore throat.  Patient does not have a fever greater than 99.5

## 2023-07-22 NOTE — PLAN OF CARE
Goal Outcome Evaluation:         Tacrolimus was not available in the pyxis, when medication was due, called pharmacy and sent a message, still waiting to be sent in the unit as of this time

## 2023-07-22 NOTE — PLAN OF CARE
Goal Outcome Evaluation:         Pt alert and oriented x4, able to make needs known. No c/o chest pain, SOB, N/V. Daughter was at bedside attentive to pt. Assist of 1 with GB and walker. Magnesium and potassium replaced in AM. Rechecked at 1700H, result Mg-2.8, Pot-4.3. , and 207. Will continue with POC.          Patient's most recent vital signs are:     Vital signs:  BP: 97/63  Temp: 98.1  HR: 103  RR: 18  SpO2: 97 %     Patient does not have new respiratory symptoms.  Patient does not have new sore throat.  Patient does not have a fever greater than 99.5.

## 2023-07-22 NOTE — PROGRESS NOTES
Vital stable .  Last night notes reviewed .  No nursing concerns brought forward  Labs ; potassium and phosphorus noticed , is on RN driven protocol  Continue prior treatment plan.    Home orders put in for PT and OT and RN for discharge

## 2023-07-23 ENCOUNTER — APPOINTMENT (OUTPATIENT)
Dept: PHYSICAL THERAPY | Facility: SKILLED NURSING FACILITY | Age: 68
End: 2023-07-23
Attending: INTERNAL MEDICINE
Payer: COMMERCIAL

## 2023-07-23 LAB
GLUCOSE BLDC GLUCOMTR-MCNC: 119 MG/DL (ref 70–99)
GLUCOSE BLDC GLUCOMTR-MCNC: 191 MG/DL (ref 70–99)
GLUCOSE BLDC GLUCOMTR-MCNC: 195 MG/DL (ref 70–99)
GLUCOSE BLDC GLUCOMTR-MCNC: 84 MG/DL (ref 70–99)
GLUCOSE BLDC GLUCOMTR-MCNC: 95 MG/DL (ref 70–99)
HOLD SPECIMEN: NORMAL
INR PPP: 2.09 (ref 0.85–1.15)
MAGNESIUM SERPL-MCNC: 1.5 MG/DL (ref 1.7–2.3)
MAGNESIUM SERPL-MCNC: 2.6 MG/DL (ref 1.7–2.3)
PHOSPHATE SERPL-MCNC: 2.6 MG/DL (ref 2.5–4.5)
POTASSIUM SERPL-SCNC: 3.9 MMOL/L (ref 3.4–5.3)

## 2023-07-23 PROCEDURE — 36415 COLL VENOUS BLD VENIPUNCTURE: CPT | Performed by: INTERNAL MEDICINE

## 2023-07-23 PROCEDURE — 84100 ASSAY OF PHOSPHORUS: CPT | Performed by: INTERNAL MEDICINE

## 2023-07-23 PROCEDURE — 250N000013 HC RX MED GY IP 250 OP 250 PS 637

## 2023-07-23 PROCEDURE — 250N000013 HC RX MED GY IP 250 OP 250 PS 637: Performed by: INTERNAL MEDICINE

## 2023-07-23 PROCEDURE — 84132 ASSAY OF SERUM POTASSIUM: CPT | Performed by: INTERNAL MEDICINE

## 2023-07-23 PROCEDURE — 250N000012 HC RX MED GY IP 250 OP 636 PS 637: Performed by: NURSE PRACTITIONER

## 2023-07-23 PROCEDURE — 250N000011 HC RX IP 250 OP 636: Mod: JZ | Performed by: INTERNAL MEDICINE

## 2023-07-23 PROCEDURE — 250N000012 HC RX MED GY IP 250 OP 636 PS 637: Performed by: INTERNAL MEDICINE

## 2023-07-23 PROCEDURE — 120N000009 HC R&B SNF

## 2023-07-23 PROCEDURE — 83735 ASSAY OF MAGNESIUM: CPT | Performed by: INTERNAL MEDICINE

## 2023-07-23 PROCEDURE — 97110 THERAPEUTIC EXERCISES: CPT | Mod: GP

## 2023-07-23 PROCEDURE — 85610 PROTHROMBIN TIME: CPT | Performed by: INTERNAL MEDICINE

## 2023-07-23 PROCEDURE — 97530 THERAPEUTIC ACTIVITIES: CPT | Mod: GP

## 2023-07-23 PROCEDURE — 99309 SBSQ NF CARE MODERATE MDM 30: CPT | Performed by: INTERNAL MEDICINE

## 2023-07-23 RX ORDER — MAGNESIUM SULFATE HEPTAHYDRATE 40 MG/ML
4 INJECTION, SOLUTION INTRAVENOUS ONCE
Status: COMPLETED | OUTPATIENT
Start: 2023-07-23 | End: 2023-07-23

## 2023-07-23 RX ADMIN — NYSTATIN 500000 UNITS: 500000 SUSPENSION ORAL at 13:23

## 2023-07-23 RX ADMIN — PREDNISONE 10 MG: 5 TABLET ORAL at 09:42

## 2023-07-23 RX ADMIN — Medication 12.5 MG: at 13:23

## 2023-07-23 RX ADMIN — NYSTATIN 500000 UNITS: 500000 SUSPENSION ORAL at 09:43

## 2023-07-23 RX ADMIN — POLYETHYLENE GLYCOL 3350 17 G: 17 POWDER, FOR SOLUTION ORAL at 09:39

## 2023-07-23 RX ADMIN — NYSTATIN 500000 UNITS: 500000 SUSPENSION ORAL at 17:35

## 2023-07-23 RX ADMIN — Medication 12.5 MG: at 21:12

## 2023-07-23 RX ADMIN — TACROLIMUS 3 MG: 1 CAPSULE ORAL at 17:35

## 2023-07-23 RX ADMIN — MYCOPHENOLIC ACID 540 MG: 180 TABLET, DELAYED RELEASE ORAL at 09:42

## 2023-07-23 RX ADMIN — MAGNESIUM OXIDE TAB 400 MG (241.3 MG ELEMENTAL MG) 400 MG: 400 (241.3 MG) TAB at 13:20

## 2023-07-23 RX ADMIN — MYCOPHENOLIC ACID 540 MG: 180 TABLET, DELAYED RELEASE ORAL at 17:34

## 2023-07-23 RX ADMIN — WARFARIN SODIUM 1.25 MG: 2.5 TABLET ORAL at 17:34

## 2023-07-23 RX ADMIN — INSULIN GLARGINE 5 UNITS: 100 INJECTION, SOLUTION SUBCUTANEOUS at 21:59

## 2023-07-23 RX ADMIN — POTASSIUM CHLORIDE 40 MEQ: 750 TABLET, EXTENDED RELEASE ORAL at 09:42

## 2023-07-23 RX ADMIN — B-COMPLEX W/ C & FOLIC ACID TAB 1 MG 1 TABLET: 1 TAB at 13:20

## 2023-07-23 RX ADMIN — URSODIOL 250 MG: 250 TABLET ORAL at 21:00

## 2023-07-23 RX ADMIN — ASPIRIN 325 MG: 325 TABLET, FILM COATED ORAL at 09:42

## 2023-07-23 RX ADMIN — SULFAMETHOXAZOLE AND TRIMETHOPRIM 1 TABLET: 400; 80 TABLET ORAL at 09:42

## 2023-07-23 RX ADMIN — POTASSIUM CHLORIDE 40 MEQ: 750 TABLET, EXTENDED RELEASE ORAL at 21:00

## 2023-07-23 RX ADMIN — NYSTATIN 500000 UNITS: 500000 SUSPENSION ORAL at 21:02

## 2023-07-23 RX ADMIN — URSODIOL 250 MG: 250 TABLET ORAL at 09:42

## 2023-07-23 RX ADMIN — PREGABALIN 25 MG: 25 CAPSULE ORAL at 09:42

## 2023-07-23 RX ADMIN — MAGNESIUM SULFATE HEPTAHYDRATE 4 G: 40 INJECTION, SOLUTION INTRAVENOUS at 11:05

## 2023-07-23 RX ADMIN — INSULIN ASPART 4 UNITS: 100 INJECTION, SOLUTION INTRAVENOUS; SUBCUTANEOUS at 17:42

## 2023-07-23 RX ADMIN — BUMETANIDE 2 MG: 1 TABLET ORAL at 17:34

## 2023-07-23 RX ADMIN — MAGNESIUM OXIDE TAB 400 MG (241.3 MG ELEMENTAL MG) 400 MG: 400 (241.3 MG) TAB at 21:01

## 2023-07-23 RX ADMIN — VALGANCICLOVIR HYDROCHLORIDE 450 MG: 450 TABLET ORAL at 21:02

## 2023-07-23 RX ADMIN — Medication 12.5 MG: at 05:02

## 2023-07-23 RX ADMIN — INSULIN ASPART 3 UNITS: 100 INJECTION, SOLUTION INTRAVENOUS; SUBCUTANEOUS at 17:42

## 2023-07-23 RX ADMIN — TACROLIMUS 3 MG: 1 CAPSULE ORAL at 09:42

## 2023-07-23 RX ADMIN — PANTOPRAZOLE SODIUM 40 MG: 40 TABLET, DELAYED RELEASE ORAL at 05:02

## 2023-07-23 RX ADMIN — ATORVASTATIN CALCIUM 20 MG: 10 TABLET, FILM COATED ORAL at 21:01

## 2023-07-23 RX ADMIN — ALLOPURINOL 100 MG: 100 TABLET ORAL at 09:42

## 2023-07-23 RX ADMIN — INSULIN ASPART 12 UNITS: 100 INJECTION, SOLUTION INTRAVENOUS; SUBCUTANEOUS at 09:38

## 2023-07-23 ASSESSMENT — ACTIVITIES OF DAILY LIVING (ADL)
ADLS_ACUITY_SCORE: 37
ADLS_ACUITY_SCORE: 35
ADLS_ACUITY_SCORE: 35
ADLS_ACUITY_SCORE: 37
ADLS_ACUITY_SCORE: 35
ADLS_ACUITY_SCORE: 37
ADLS_ACUITY_SCORE: 35
ADLS_ACUITY_SCORE: 37

## 2023-07-23 NOTE — PROGRESS NOTES
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 Patient also had end-stage renal disease secondary to IgA nephropathy and ANCA vasculitis.      Patient had been hospitalized from 05-Jun-2023 to 25-Jun-2023 for end-stage liver disease and end-stage renal disease. On 06-Jun-2023, patient underwent simultaneous liver and kidney transplant. On 06-Jun-2023, patient returned to the OR with concern for low flow in the renal graft - patient underwent ex-lap, washout, closure with healthy appearing graft with intact arterial and venous flow. Hospital course of was also compliucated by acute kidney injury requiring CRRT and intermittent hemodialysis, right internal jugular clot, atrial fibrillation, hypertension, fluid overload, hypoxemia, anemia, leucocytosis, malnutrition, gout, deconditioning and hyperglycemia secondary to steroids and/or tube feeds. Patient was discharged to TCU on 25-Jun-2023.     On 15-Chris-2023, patient underwent ultrasound of bilateral upper extremities that showed persistent nonocclusive thrombus in the lower right internal jugular vein similar to comparison ultrasound on 03-Mar-2023 and resolved right axillary thrombus.      During hospital stay, patient had been positive for hepatitis B surface antigen on 05-Jun-2023 but was not previously positive on 26-Jan-2023. Hepatitis B virus DNA was negative on 05-Jun-2023 and patient had no clear at risk behaviour. Repeat hepatitis B surface antigen and HBV was not detected. The positive hepatitis B surface antigen on 05-Jun-2023 is suspected to be a false positive.    7/23  Stop lyrica   Stop oxycodone ( not using )   Reduce  lantus to 5 units   Labs tomorrow   Bumex held this am due to parameters     P:  1.) Liver cirrhosis secondary to alcohol and hemachromatosis s/p liver transplant; end-stage renal disease secondary to IgA nephropathy and ANCA vasculitis:  - Patient on mycophenolic acid, tacrolimus and prednisone.  - Patient on prophylactic bactrim and valganciclovir.      2.) Acute on chronic anemia:  - Monitoring blood counts.  - Transfusion if Hb is below 7.     3.) Leucocytosis, resolved:  - Monitoring for evidence of infection.     4.) Lower extremity edema:  - Patient on bumex. Monitoring response.     5.) Right internal jugular thrombus:  - Patient on coumadin for anticoagulation with goal INR 1.5-2.0 due to risk of bleeding.  - INR at goal and patient no longer on heparin.     6.) Coronary artery disease:  - Patient on aspirin, atorvastatin and metoprolol.     7.) Paroxysmal atrial fibrillation:  - Patient on metoprolol. Patient already on coumdin for anticoagulation with goal INR 1.5-2.0     8.) Hyperglycemia secondary to steroids, tube feeds or both:  - Patient on lantus 6 units nightly. Patient on novolog 1 unit per 12 gm of carbohydrates with meals. Patient on insulin sliding scale.     9.) Psoriatic arthritis:  - Patient was on prednisone 10 mg daily prior to hospitalization and remains on prednisone 10 mg daily.     10.) Gout:   - Patient on allopurinol.  - Patient to f/u with Rheumatology as scheduled.     11.) Hypokalemia:  - Supplementing.        # Hypomagnesemia: Lowest Mg = 1.5 mg/dL in last 2 days, will replace as needed   # Hypoalbuminemia: Lowest albumin = 2.6 g/dL at 7/7/2023 10:10 AM, will monitor as appropriate        # Moderate Malnutrition: based on nutrition assessment         Diet: Regular Diet Adult  Snacks/Supplements Adult: Other; Please allow pt/RN to order snacks/supplements PRN; Between Meals    DVT Prophylaxis: Warfarin  Lux Catheter: Not present  Lines: None     Cardiac Monitoring: None  Code  Status: Full Code      Clinically Significant Risk Factors           Disposition Plan     Expected Discharge Date: 07/27/2023                  Lashay Rachel MD  Hospitalist Service  [facility]  Securely message with SMS Assist (more info)  Text page via [employer] Paging/Directory   ______________________________________________________________________    Interval History   Feels well   Anticipating discharge to home this week   No new symptoms reported per nursing staff .  Last night notes reviewed .  No chest pain or Shortness of breath reported.  No vomiting   No difficulty with voiding   Passing gas .    4 system ROS reviewed .    Physical Exam   Vital Signs: Temp: 97.9  F (36.6  C) Temp src: Oral BP: 94/66 Pulse: 109   Resp: 18 SpO2: 99 % O2 Device: None (Room air)    Weight: 217 lbs 8 oz    Alert and oriented   Sitting in wheel chair   Joyous mood   Neck ; supple  Chest ; clear BL   CVS ; s1 ands 2   GI ; soft abdomen, BS positive   Ext ; edema      Medical Decision Making       45 MINUTES SPENT BY ME on the date of service doing chart review, history, exam, documentation & further activities per the note.      Data     I have personally reviewed the following data over the past 24 hrs:    N/A  \   N/A   / N/A     N/A N/A N/A /  84   3.9 N/A N/A \       INR:  2.09 (H) PTT:  N/A   D-dimer:  N/A Fibrinogen:  N/A

## 2023-07-23 NOTE — PLAN OF CARE
Goal Outcome Evaluation:  A & O x 4; Denies SOB, CP, AP, Pain, N/V; Continent to B&B;  makes needs known; A1 with walker and GB. PIV port-a-cath. flushed and patent. Spouse took him for a walk and later helped him with bath; eats regular diet; takes med. whole thin liquid.  so correction dose NG. Continue to monitor POC.                        Patient's most recent vital signs are:     Vital signs:  BP: 100/63  Temp: 98.3  HR: 91  RR: 18  SpO2: 98 %     Patient does not have new respiratory symptoms.  Patient does not have new sore throat.  Patient does not have a fever greater than 99.5.

## 2023-07-23 NOTE — PROGRESS NOTES
"   07/23/23 2840   Appointment Info   Signing Clinician's Name / Credentials (PT) Rhianna Barney PTA   PT Assistant Visit Number 3   Therapeutic Procedure/Exercise   Ther. Procedure: strength, endurance, ROM, flexibillity Minutes (35940) 15   Symptoms Noted During/After Treatment fatigue   Treatment Detail/Skilled Intervention PT: focus on BLE strengthening with NuStep and gait activity. NuStep with high intensity intervals of 30 sec at L9 then 30 sec rest break for a total of 5 bouts. use of LE only. Gait activity for BLE strength and endurance: amb 115ft x 1, 50ft x 1, FWW, CGA with w/c follow by second person while performing turns, navigating around objects and crossing different surfaces. noted increased unsteadiness today d/t fatigue.   Therapeutic Activity   Therapeutic Activities: dynamic activities to improve functional performance Minutes (19129) 25   Symptoms Noted During/After Treatment Fatigue   Treatment Detail/Skilled Intervention PT: focus on stairs. see gg...   PT Discharge Planning   PT Plan PT: strengthening of glute med (sidelying clamshells, clamshells w/theraband, sidelying bicycle, sidelying straight leg raise), side step up in // w/5\" step then progressing to 7\" step, bridging on mat   PT Discharge Recommendation (DC Rec) home with assist   PT Rationale for DC Rec PT: Pt lives in [place] with wife at baseline. Plan to return. Ramp to access main level, but has only commode and no bed. 8 stairs up to full bathroom and bed   PT Brief overview of current status PT:  Amb up to 110' bouts with CGA x 1, if spouse is present she will provide wc follow but it is not required, therapist can bring wc, squat pivot and stand pivot no AD, SBA   Total Session Time   Timed Code Treatment Minutes 40   Total Session Time (sum of timed and untimed services) 40   Post Acute Settings Only   What unit is patient on? TCU   Ambulation   Walk in pierre: Patient Performance Total dependence (helper does ALL of the " "effort)   Walks in pierre: Staff Performance Two +person assist ( two plus persons physical assist)   Mobility device used rolling walker   Describe Performance PT: Gait activity for BLE strength and endurance: amb 115ft x 1, 50ft x 1, FWW, CGA with w/c follow by second person while performing turns, navigating around objects and crossing different   1 Step (curb)   Patient Performance Steadying Assist   Describe Performance PT: ascend/descend 3 - 6\" steps forward w/step to pattern, B HR, CGA. Pt reports having only single rail on stairs. Attempted side step at 6\" step and Pt unable. In //, Pt performed side stepping for 1 bout x 20ft, then progressed to side stepping onto 2\" step for 2 bouts x 5 reps. Progressed to sidestepping onto 3' step for 2 bouts x 5 reps. Progressed to sidestepping onto 5\" step for 2 bout x 5 reps then 1 bout x 7 reps. Pt able to perform CGA. With 5\" step, Pt relied more heavily on BUE d/t BLE weakness.       "

## 2023-07-23 NOTE — PLAN OF CARE
Goal Outcome Evaluation:    VS: /67   Pulse 99   Temp 97.9  F (36.6  C) (Oral)   Resp 18   Wt 98.7 kg (217 lb 8 oz)   SpO2 99%   BMI 34.07 kg/m       O2: RA   Output: Voids in toilet   Last BM: 7/22 in toilet   Activity: Therapy; up in recliner, tv, radio  Spouse and kids visiting; went to [employer] for late lunch/early dinner.    Skin: X;  R PIV  Abdominal incisions  R foot gout  R elbow & upper arm   Pain: Denies    CMS:  Neuro: Some N/T to BLE  A&O, makes needs known   Dressing: R PIV CDI  Abdominal incisions EDUARDO  R foot w/ gout  R elbow    Diet: Reg, CHO ct, good appetite  BG 84 and 119   LDA: R PIV CDI, infused Mg+ w/ no complications   Equipment: WW, GB, IV pole, pump   Plan: Con't POC.    Additional Info: K+ 3.9 & Ph+ 2.6 - no replacement needed.   Mg+ 1.5 IV replacement given.  Lab redraw 2-4 H post infusion at 1600.      Patient's most recent vital signs are:     Vital signs:  BP: 107/67  Temp: 97.9  HR: 99  RR: 18  SpO2: 99 %     Patient does not have new respiratory symptoms.  Patient does not have new sore throat.  Patient does not have a fever greater than 99.5.

## 2023-07-23 NOTE — PLAN OF CARE
Goal Outcome Evaluation:  No acute issues overnight. Appears sleeping well in bed tonight. Continent using urinal indep.- staff emptied. Has no c/o's as of yet. On electrolyte replacements - labs ordered this AM per protocol.        Patient's most recent vital signs are:     Vital signs:  BP: 115/61  Temp: 97.8  HR: 98  RR: 18  SpO2: 98 %     Patient does not have new respiratory symptoms.  Patient does not have new sore throat.  Patient does not have a fever greater than 99.5.

## 2023-07-24 ENCOUNTER — APPOINTMENT (OUTPATIENT)
Dept: PHYSICAL THERAPY | Facility: SKILLED NURSING FACILITY | Age: 68
End: 2023-07-24
Attending: INTERNAL MEDICINE
Payer: COMMERCIAL

## 2023-07-24 ENCOUNTER — APPOINTMENT (OUTPATIENT)
Dept: OCCUPATIONAL THERAPY | Facility: SKILLED NURSING FACILITY | Age: 68
End: 2023-07-24
Attending: INTERNAL MEDICINE
Payer: COMMERCIAL

## 2023-07-24 LAB
ALBUMIN SERPL BCG-MCNC: 3 G/DL (ref 3.5–5.2)
ALP SERPL-CCNC: 221 U/L (ref 40–129)
ALT SERPL W P-5'-P-CCNC: 52 U/L (ref 0–70)
ANION GAP SERPL CALCULATED.3IONS-SCNC: 11 MMOL/L (ref 7–15)
AST SERPL W P-5'-P-CCNC: 39 U/L (ref 0–45)
BILIRUB SERPL-MCNC: 0.5 MG/DL
BUN SERPL-MCNC: 20.1 MG/DL (ref 8–23)
CALCIUM SERPL-MCNC: 8.8 MG/DL (ref 8.8–10.2)
CHLORIDE SERPL-SCNC: 100 MMOL/L (ref 98–107)
CREAT SERPL-MCNC: 1.01 MG/DL (ref 0.67–1.17)
DEPRECATED HCO3 PLAS-SCNC: 26 MMOL/L (ref 22–29)
GFR SERPL CREATININE-BSD FRML MDRD: 83 ML/MIN/1.73M2
GLUCOSE BLDC GLUCOMTR-MCNC: 127 MG/DL (ref 70–99)
GLUCOSE BLDC GLUCOMTR-MCNC: 174 MG/DL (ref 70–99)
GLUCOSE BLDC GLUCOMTR-MCNC: 175 MG/DL (ref 70–99)
GLUCOSE BLDC GLUCOMTR-MCNC: 87 MG/DL (ref 70–99)
GLUCOSE SERPL-MCNC: 120 MG/DL (ref 70–99)
INR PPP: 2.28 (ref 0.85–1.15)
MAGNESIUM SERPL-MCNC: 1.9 MG/DL (ref 1.7–2.3)
PHOSPHATE SERPL-MCNC: 2.2 MG/DL (ref 2.5–4.5)
POTASSIUM SERPL-SCNC: 3.9 MMOL/L (ref 3.4–5.3)
PROT SERPL-MCNC: 5.6 G/DL (ref 6.4–8.3)
SODIUM SERPL-SCNC: 137 MMOL/L (ref 136–145)
TACROLIMUS BLD-MCNC: 6.6 UG/L (ref 5–15)
TME LAST DOSE: NORMAL H
TME LAST DOSE: NORMAL H

## 2023-07-24 PROCEDURE — 250N000013 HC RX MED GY IP 250 OP 250 PS 637: Performed by: INTERNAL MEDICINE

## 2023-07-24 PROCEDURE — 99207 PR NO BILLABLE SERVICE THIS VISIT: CPT | Performed by: INTERNAL MEDICINE

## 2023-07-24 PROCEDURE — 80197 ASSAY OF TACROLIMUS: CPT | Performed by: INTERNAL MEDICINE

## 2023-07-24 PROCEDURE — 97530 THERAPEUTIC ACTIVITIES: CPT | Mod: GP | Performed by: PHYSICAL THERAPIST

## 2023-07-24 PROCEDURE — 84100 ASSAY OF PHOSPHORUS: CPT | Performed by: INTERNAL MEDICINE

## 2023-07-24 PROCEDURE — 258N000003 HC RX IP 258 OP 636: Performed by: INTERNAL MEDICINE

## 2023-07-24 PROCEDURE — 36415 COLL VENOUS BLD VENIPUNCTURE: CPT | Performed by: INTERNAL MEDICINE

## 2023-07-24 PROCEDURE — 250N000009 HC RX 250: Performed by: INTERNAL MEDICINE

## 2023-07-24 PROCEDURE — G0463 HOSPITAL OUTPT CLINIC VISIT: HCPCS

## 2023-07-24 PROCEDURE — 83735 ASSAY OF MAGNESIUM: CPT | Performed by: INTERNAL MEDICINE

## 2023-07-24 PROCEDURE — 80053 COMPREHEN METABOLIC PANEL: CPT | Performed by: INTERNAL MEDICINE

## 2023-07-24 PROCEDURE — 120N000009 HC R&B SNF

## 2023-07-24 PROCEDURE — 97110 THERAPEUTIC EXERCISES: CPT | Mod: GP | Performed by: PHYSICAL THERAPIST

## 2023-07-24 PROCEDURE — 250N000012 HC RX MED GY IP 250 OP 636 PS 637: Performed by: INTERNAL MEDICINE

## 2023-07-24 PROCEDURE — 85610 PROTHROMBIN TIME: CPT | Performed by: INTERNAL MEDICINE

## 2023-07-24 PROCEDURE — 97535 SELF CARE MNGMENT TRAINING: CPT | Mod: GO

## 2023-07-24 PROCEDURE — 97530 THERAPEUTIC ACTIVITIES: CPT | Mod: GO

## 2023-07-24 PROCEDURE — 250N000012 HC RX MED GY IP 250 OP 636 PS 637: Performed by: NURSE PRACTITIONER

## 2023-07-24 RX ORDER — MULTIPLE VITAMINS W/ MINERALS TAB 9MG-400MCG
1 TAB ORAL DAILY
Status: DISCONTINUED | OUTPATIENT
Start: 2023-07-24 | End: 2023-07-24

## 2023-07-24 RX ORDER — MULTIPLE VITAMINS W/ MINERALS TAB 9MG-400MCG
1 TAB ORAL DAILY
Status: DISCONTINUED | OUTPATIENT
Start: 2023-07-25 | End: 2023-07-27 | Stop reason: HOSPADM

## 2023-07-24 RX ORDER — MAGNESIUM OXIDE 400 MG/1
800 TABLET ORAL 2 TIMES DAILY
Status: DISCONTINUED | OUTPATIENT
Start: 2023-07-24 | End: 2023-07-27 | Stop reason: HOSPADM

## 2023-07-24 RX ADMIN — Medication 12.5 MG: at 13:07

## 2023-07-24 RX ADMIN — ATORVASTATIN CALCIUM 20 MG: 10 TABLET, FILM COATED ORAL at 21:32

## 2023-07-24 RX ADMIN — TACROLIMUS 3 MG: 1 CAPSULE ORAL at 08:18

## 2023-07-24 RX ADMIN — POLYETHYLENE GLYCOL 3350 17 G: 17 POWDER, FOR SOLUTION ORAL at 08:18

## 2023-07-24 RX ADMIN — PREDNISONE 10 MG: 5 TABLET ORAL at 08:18

## 2023-07-24 RX ADMIN — TACROLIMUS 3 MG: 1 CAPSULE ORAL at 17:56

## 2023-07-24 RX ADMIN — POTASSIUM CHLORIDE 40 MEQ: 750 TABLET, EXTENDED RELEASE ORAL at 08:18

## 2023-07-24 RX ADMIN — Medication 1 MG: at 17:56

## 2023-07-24 RX ADMIN — SULFAMETHOXAZOLE AND TRIMETHOPRIM 1 TABLET: 400; 80 TABLET ORAL at 08:18

## 2023-07-24 RX ADMIN — MAGNESIUM OXIDE TAB 400 MG (241.3 MG ELEMENTAL MG) 800 MG: 400 (241.3 MG) TAB at 21:33

## 2023-07-24 RX ADMIN — NYSTATIN 500000 UNITS: 500000 SUSPENSION ORAL at 13:07

## 2023-07-24 RX ADMIN — INSULIN ASPART 2 UNITS: 100 INJECTION, SOLUTION INTRAVENOUS; SUBCUTANEOUS at 19:06

## 2023-07-24 RX ADMIN — NYSTATIN 500000 UNITS: 500000 SUSPENSION ORAL at 16:17

## 2023-07-24 RX ADMIN — INSULIN GLARGINE 5 UNITS: 100 INJECTION, SOLUTION SUBCUTANEOUS at 21:44

## 2023-07-24 RX ADMIN — VALGANCICLOVIR HYDROCHLORIDE 450 MG: 450 TABLET ORAL at 19:12

## 2023-07-24 RX ADMIN — NYSTATIN 500000 UNITS: 500000 SUSPENSION ORAL at 08:17

## 2023-07-24 RX ADMIN — NYSTATIN 500000 UNITS: 500000 SUSPENSION ORAL at 21:32

## 2023-07-24 RX ADMIN — SODIUM PHOSPHATE, MONOBASIC, MONOHYDRATE AND SODIUM PHOSPHATE, DIBASIC, ANHYDROUS 15 MMOL: 276; 142 INJECTION, SOLUTION INTRAVENOUS at 12:37

## 2023-07-24 RX ADMIN — URSODIOL 250 MG: 250 TABLET ORAL at 08:18

## 2023-07-24 RX ADMIN — POTASSIUM CHLORIDE 40 MEQ: 750 TABLET, EXTENDED RELEASE ORAL at 21:32

## 2023-07-24 RX ADMIN — INSULIN ASPART 2 UNITS: 100 INJECTION, SOLUTION INTRAVENOUS; SUBCUTANEOUS at 13:35

## 2023-07-24 RX ADMIN — BUMETANIDE 2 MG: 1 TABLET ORAL at 08:18

## 2023-07-24 RX ADMIN — PANTOPRAZOLE SODIUM 40 MG: 40 TABLET, DELAYED RELEASE ORAL at 06:03

## 2023-07-24 RX ADMIN — MAGNESIUM OXIDE TAB 400 MG (241.3 MG ELEMENTAL MG) 800 MG: 400 (241.3 MG) TAB at 13:07

## 2023-07-24 RX ADMIN — SIMETHICONE 80 MG: 80 TABLET, CHEWABLE ORAL at 13:07

## 2023-07-24 RX ADMIN — Medication 12.5 MG: at 06:03

## 2023-07-24 RX ADMIN — ASPIRIN 325 MG: 325 TABLET, FILM COATED ORAL at 08:18

## 2023-07-24 RX ADMIN — MYCOPHENOLIC ACID 540 MG: 180 TABLET, DELAYED RELEASE ORAL at 17:56

## 2023-07-24 RX ADMIN — ALLOPURINOL 100 MG: 100 TABLET ORAL at 08:18

## 2023-07-24 RX ADMIN — INSULIN ASPART 5 UNITS: 100 INJECTION, SOLUTION INTRAVENOUS; SUBCUTANEOUS at 19:08

## 2023-07-24 RX ADMIN — URSODIOL 250 MG: 250 TABLET ORAL at 21:33

## 2023-07-24 RX ADMIN — INSULIN ASPART 8 UNITS: 100 INJECTION, SOLUTION INTRAVENOUS; SUBCUTANEOUS at 08:22

## 2023-07-24 RX ADMIN — Medication 12.5 MG: at 21:33

## 2023-07-24 RX ADMIN — MYCOPHENOLIC ACID 540 MG: 180 TABLET, DELAYED RELEASE ORAL at 08:18

## 2023-07-24 RX ADMIN — BUMETANIDE 2 MG: 1 TABLET ORAL at 16:17

## 2023-07-24 RX ADMIN — DICLOFENAC SODIUM 4 G: 10 GEL TOPICAL at 08:25

## 2023-07-24 RX ADMIN — INSULIN ASPART 5 UNITS: 100 INJECTION, SOLUTION INTRAVENOUS; SUBCUTANEOUS at 13:35

## 2023-07-24 ASSESSMENT — ACTIVITIES OF DAILY LIVING (ADL)
ADLS_ACUITY_SCORE: 37
ADLS_ACUITY_SCORE: 39
ADLS_ACUITY_SCORE: 37
ADLS_ACUITY_SCORE: 37
ADLS_ACUITY_SCORE: 39
ADLS_ACUITY_SCORE: 37
ADLS_ACUITY_SCORE: 39
ADLS_ACUITY_SCORE: 37
ADLS_ACUITY_SCORE: 37
ADLS_ACUITY_SCORE: 39

## 2023-07-24 NOTE — PLAN OF CARE
Care Coordination:     Writer met with patient and spouse. Discussed home care options- [facility] was the only one considering and would need a prior auth (which has not been approved yet). Per PT/OT- they felt it would be more appropriate that patient go to OP therapy. Discussed HC vs OP with patient and family. They are okay to persure OP therapies instead.     Lizbeth Vasquez   Patient Care Management Coordinator  Acute Rehabilitation Unit/ Transitional Care Unit.   Ph: [phone]

## 2023-07-24 NOTE — PROGRESS NOTES
RECERT   07/24/23 1328   Appointment Info   Signing Clinician's Name / Credentials (OT) Jayshree Reese, OTR/L CBYULY   Rehab Comments (OT) recert   Living Environment   People in Home spouse   Current Living Arrangements house   Home Accessibility stairs within home   Number of Stairs, Within Home, Primary eight;other (see comments)   Stair Railings, Within Home, Primary railing on right side (ascending)   Transportation Anticipated family or friend will provide   Living Environment Comments quad level house main level has kitchen, dining room, upstairs to get to bathroom with tub, down stairs to get to walk in shower, moveable grab bars. Has a 4WW   Self-Care   Usual Activity Tolerance moderate   Current Activity Tolerance moderate   Regular Exercise Yes   Equipment Currently Used at Home grab bar, tub/shower;walker, rolling   Fall history within last six months no   Activity/Exercise/Self-Care Comment indep at baseline   Instrumental Activities of Daily Living (IADL)   Previous Responsibilities meal prep;housekeeping;medication management;laundry;yardwork   IADL Comments Pt and wife report fluctuting levels of assist with medical status. Wife reports recently she has been completing most IADLs. Pt uses pillbox at baseline.   General Information   Onset of Illness/Injury or Date of Surgery 06/06/23   Referring Physician Toni Sheth MD   Patient/Family Therapy Goal Statement (OT) to be indep   Additional Occupational Profile Info/Pertinent History of Current Problem Per H&P: 67 year old male admitted on 6/25/2023 for ongoing rehabilitation after hospitalization at [facility].  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  He underwent  simultaneous liver kidney transplant on 6/6/2023. RTOR on 6/6/203 with concern for low flow  in the renal graft - ex-lap, washout, closure with healthy appearing graft with intact arterial and venous flow.  He is hospital course was also complicated by renal failure requiring CRRT and intermittent hemodialysis, RIJ clot, atrial fibrillation, hypertension, fluid overload, hypoxemia, anemia, leukocytosis, malnutrition, steroids/tube feed induced hyperglycemia, gout, deconditioning.  For details of hospitalization please refer to the discharge summary note on 6/25/2023.   Existing Precautions/Restrictions immunosuppressed;lifting;fall   Left Upper Extremity (Weight-bearing Status) partial weight-bearing (PWB)  (<10#s)   Right Upper Extremity (Weight-bearing Status) partial weight-bearing (PWB)  (<10#s)   Left Lower Extremity (Weight-bearing Status) full weight-bearing (FWB)   Right Lower Extremity (Weight-bearing Status) full weight-bearing (FWB)   Cognitive Status Examination   Orientation Status orientation to person, place and time   Cognitive Status Comments basic cog appears WFL   Visual Perception   Visual Impairment/Limitations corrective lenses for reading   Sensory   Sensory Comments tingling in feet, pt reporting 2/2 onset of gout   Pain Assessment   Patient Currently in Pain   (slight shoulder pain, painful during gout flareups)   Posture   Posture forward head position;protracted shoulders   Range of Motion Comprehensive   Comment, General Range of Motion WFL marcos UE   Strength Comprehensive (MMT)   Comment, General Manual Muscle Testing (MMT) Assessment generalized weakness, deconditioned   Coordination   Fine Motor Coordination Gout limiting FMC and finger/thumb opposition when flared.   Bed Mobility   Comment (Bed Mobility) see gg codes   Transfers   Transfer Comments see gg codes   Activities of Daily Living   BADL Assessment/Intervention   (See GG)   Clinical Impression   Criteria for Skilled Therapeutic Interventions Met (OT) Yes, treatment indicated   OT Diagnosis decreased safety/indep w/  adls and mob/iadls   OT Problem List-Impairments impacting ADL problems related to;activity tolerance impaired;balance;mobility;range of motion (ROM);strength;pain;post-surgical precautions;postural control;sensation   Assessment of Occupational Performance 5 or more Performance Deficits   Identified Performance Deficits dressing, toileting, home mgmt, bathing, yardwork, med mgmt   Planned Therapy Interventions (OT) ADL retraining;IADL retraining;balance training;ROM;strengthening;transfer training;progressive activity/exercise;home program guidelines   Clinical Decision Making Complexity (OT) moderate complexity   Anticipated Equipment Needs Upon Discharge (OT)   (ext tub bench, pt plans to get on own prior to d/c)   Risk & Benefits of therapy have been explained evaluation/treatment results reviewed;care plan/treatment goals reviewed;risks/benefits reviewed;current/potential barriers reviewed;participants voiced agreement with care plan;participants included;patient;spouse/significant other   Clinical Impression Comments OT: pt has made significant progress during TCU stay, initially depend w/ transfers/mobility and currently sba to modified indep w/ AE/Assist device and short distance ambulation w/ w/c follow due to variable jade for distance,pt requires assist for feet level of adls and assist w/ Iadls, recommend cont OT to increase safety and indep w/ adls/iadls/mobiity to improve ease and quality of life and decrease burden of care . recommend establish HEP.   Therapy Certification   Medical Diagnosis S/P Liver and R kidney transplant   Start of Care Date 07/24/23   Certification date from 07/24/23   Certification date to 08/22/23   OT Goals   Therapy Frequency (OT) 6 times/wk   OT Predicted Duration/Target Date for Goal Attainment 07/26/23   OT: Hygiene/Grooming modified independent   OT: Upper Body Dressing Modified independent   OT: Lower Body Dressing Modified independent   OT: Upper Body Bathing  Supervision/stand-by assist;Modified independent   OT: Lower Body Bathing Supervision/stand-by assist;using adaptive equipment;with precautions   OT: Toilet Transfer/Toileting Modified independent   OT: Meal Preparation with simple meal preparation;Supervision/stand-by assist;within precautions;using adaptive equipment;ambulatory level;from wheelchair   Interventions   Interventions Quick Adds Self-Care/Home Management   OT Discharge Planning   OT Plan OT: abd precautions, fall, Rx:activity jade in sitting and standing, functional mob, LB drg at feet level, HEP to be setup   OT Discharge Recommendation (DC Rec) home with assist  (outpt PT only)   OT Rationale for DC Rec pt has commode at home, plans to order ext tub bench. declining sock aide/reacher/long handled shoe horn at this time   OT Brief overview of current status see clinical impressions   Post Acute Settings Only   What unit is patient on? TCU   Bed Mobility: Turning side to side/Roll Left and Right   Describe Performance OT: indep   Bed Mobility: Sit to lying   Describe Performance OT: modified indep   Bed Mobility: Lying to sitting on the side of bed   Describe Performance OT: modified indep   Transfers: Sit to Stand   Describe Performance OT: modified indep, FWW   Transfers: Chair/Bed transfers   Describe Performance OT: modified indep   Ambulation   Describe Performance OT: modified indep FWW in room/bathroom   Picking up Object   Reason Not Done Safety concerns   Upper Body Dressing   Describe Performance OT: modified indep   Lower Body Dressing (Pants/Undergarments)   Describe Performance OT: sba /setup   Lower Body Dressing (Putting On/Taking-Off Footwear)   Describe Performance OT: mod to max A   Toileting Hygiene   Describe Performance OT: modified indep   Transfers: Toilet transfers   Describe Performance OT: modfied indep w/ grabbar/fww   Hygiene/Grooming   Describe Performance OT: modified indep sitting in w/c at sink   Oral Hygiene   Describe  Performance OT: modified indep sitting in w/c at sink

## 2023-07-24 NOTE — CONSULTS
[facility] Transitional Care  [facility] Nurse Inpatient Assessment     Consulted for: abdomen    Patient History (according to provider note(s):      Damion Quinones is a 67 year old male admitted on 6/25/2023 for ongoing rehabilitation after hospitalization at [facility].    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   He underwent simultaneous liver kidney transplant on 6/6/2023. RTOR on 6/6/203 with concern for low flow in the renal graft - ex-lap, washout, closure with healthy appearing graft with intact arterial and venous flow. He is hospital course was also complicated by renal failure requiring CRRT and intermittent hemodialysis, RIJ clot, atrial fibrillation, hypertension, fluid overload, hypoxemia, anemia, leukocytosis, malnutrition, steroids/tube feed induced hyperglycemia, gout and deconditioning.  For details of hospitalization please refer to the discharge summary note on 6/25/2023.       Assessment:      Areas visualized during today's visit: Abdomen    Wound location: abdomen    Last photo: 7/24  Wound due to: Surgical Wound  Wound history/plan of care: nonhealing surgical incision. Several areas of dried drainage that can be left open to air.   Spouse at bedside and instructed on wound care  Wound base: 100 % fibrin,      Palpation of the wound bed: normal      Drainage: small     Description of drainage: serous     Measurements (length x width x depth, in cm): 1.2  x 2.3  x  0.3 cm      Tunneling: N/A     Undermining: N/A  Periwound skin: Intact      Color: normal and consistent with surrounding tissue      Temperature: normal   Odor: none  Pain: denies , none  Pain interventions prior to dressing change: no significant pain present   Treatment goal: Heal , Infection control/prevention, and Increase  "granulation  STATUS: initial assessment  Supplies ordered: discussed with RN and discussed with patient       Treatment Plan:     Right abdomen wound(s): Daily   Cleanse with wound cleanser, pat dry.   Cut Aquacel Ag to slightly wider than wound bed.  Fold Aquacel Ag into wound bed.  Cover with mepilex.   Intact scabs: leave in place. OK to shower. May cleanse with wound cleanser and pat dry, leave open to air. If loosen, ok to trim if needed but do not force or \"pick\" at scab. If scabs come off and wound is open, use above wound care until healed.     Orders: Written    RECOMMEND PRIMARY TEAM ORDER: None, at this time  Education provided: plan of care, wound progress, and Infection prevention   Discussed plan of care with: Patient, Family, and Nurse  [facility] nurse follow-up plan: weekly  Notify WOC if wound(s) deteriorate.  Nursing to notify the Provider(s) and re-consult the WO Nurse if new skin concern.    DATA:     Current support surface: Standard  Standard gel/foam mattress (IsoFlex, Atmos air, etc)  Containment of urine/stool: Continent of bladder and Continent of bowel  BMI: Body mass index is 34.07 kg/m .   Active diet order: Orders Placed This Encounter      Regular Diet Adult     Output: I/O last 3 completed shifts:  In: -   Out: 1875 [Urine:1875]     Labs:   Recent Labs   Lab 07/24/23  0801 07/21/23  0706 07/20/23 2034   ALBUMIN 3.0*   < >  --    HGB  --   --  7.3*   INR 2.28*   < >  --    WBC  --   --  8.7    < > = values in this interval not displayed.     Pressure injury risk assessment:   Sensory Perception: 3-->slightly limited  Moisture: 4-->rarely moist  Activity: 3-->walks occasionally  Mobility: 3-->slightly limited  Nutrition: 3-->adequate  Friction and Shear: 2-->potential problem  Jamil Score: 18    Sera Huston RN CWOCN  Pager no longer is use, please contact through [employer] [facility] group: [facility] Nurse [facility]  Dept. Office Number: *[phone]    "

## 2023-07-24 NOTE — PLAN OF CARE
Goal Outcome Evaluation:    A&O X4; denies CP, SOB, and N&V. Eats regular diet. Takes med whole with thin liquid. A 1 with W&GB. Insulin aspart (NovoLOG) not given this evening, BG parameters not met. Spouse took him out and he ate subway. PIV flushed and patent. Has ability to make needs known.  Continue to monitor POC.                        Patient's most recent vital signs are:     Vital signs:  BP: 97/65  Temp: 97.5  HR: 77  RR: 17  SpO2: 97 %     Patient does not have new respiratory symptoms.  Patient does not have new sore throat.  Patient does not have a fever greater than 99.5.

## 2023-07-24 NOTE — CONSULTS
Consulted by Ashtyn Dey to run a test claim for Blood Glucose Meter/Supplies, Glargine Insulin, & Rapid Analog Insulin.    Patient has pharmacy benefits through [employer] Per insurance, the following are covered and preferred under the patient's plans:       Freestyle meter/supplies - $0    OneTouch meter/supplies - $0    Semglee pens/vials - $0    Basaglar pens - $0    Humalog pens/vials - $0    Lyumjev pens/vials - $0     The following are not covered:    Accu-chek meter/supplies    Contour meter/supplies    Lantus    Novolog    Insulin Aspart    Fiasp    Insulin Lispro    Admelog      Please feel free to contact me with any other test claims, prior authorizations, or insurance questions regarding outpatient medications.     Thanks!      Norma No CPhT  [facility] Liaison  Pronouns: She/Her/Hers    [facility]  [address] [place] [address] [address] [address]   [email or web address]  [email or web address]   Phone: [phone]  Pager: [phone]  Fax: [phone]

## 2023-07-24 NOTE — PLAN OF CARE
"Goal Outcome Evaluation:  Pt.A&Ox4. Uses call light appropriately. Requested to have O2 tonight, \"trouble getting to sleep\" applied @ 2L via NC. Subsequent rounds, pt.appears sleeping well. Has no c/o's pain, discomfort, CP/SOB. Continent using urinal indep.- staff empties. On Mg / K+ and Phos.replacement - labs this AM.     0605 - Writer went to administer AM meds, pt.sitting up EOB and stated he just returned from [place]. Writer reinforced staff assist and told pt.to check w/therapy today to determine if safe for indep.to BR. Pt.verbalized understanding. Reported lrg,formed BM.        Patient's most recent vital signs are:     Vital signs:  BP: 110/72  Temp: 97.5  HR: 95  RR: 17  SpO2: 97 %     Patient does not have new respiratory symptoms.  Patient does not have new sore throat.  Patient does not have a fever greater than 99.5.                               "

## 2023-07-24 NOTE — PLAN OF CARE
Care Coordination:    Writer faxed over face to face, and home care orders so they can request prior authorization approval for home care. Writer asked for expected timeline of when this will be approved or denied- insurance company can take up to 14 days to respond.     Writer will approach IDT to see if OP is more appropriate at this time.     Lizbeth Vasquez   Patient Care Management Coordinator  Acute Rehabilitation Unit/ Transitional Care Unit.   Ph: [phone]

## 2023-07-24 NOTE — PLAN OF CARE
Goal Outcome Evaluation:     VSS. Alert and orientedx4. Able to communicate needs. Denies chest pain, N/V, dizziness, headache. Phos level low- replaced IV via piv- site patent, no redness, no pain. Mod I in room starting today. Multiple bruising present to BUE. BG monitored with sliding scale insulin and carb coverage.   Recheck labs edi.  Will continue plan of care.  Vital signs:  Temp: 98.1  F (36.7  C) Temp src: Oral BP: 112/60 Pulse: 98   Resp: 18 SpO2: 98 % O2 Device: None (Room air) Oxygen Delivery: 2 LPM

## 2023-07-24 NOTE — PROGRESS NOTES
TCU Notice of Medicare Non-Coverage Note:     Met with patient to Introduce self and role.     Discussed Notice of Medicare Non-Coverage and last day of coverage as required for all patients with Medicare coverage during Skilled Nursing Facility (SNF) stays.Informed patient/family that Medicare covers stay until midnight of day before discharge. TCU does not bill for discharge date.    Last coverage day is 7/26/23. Discharge date expected 7/27/23.    Opportunity provided for questions and education provided regarding potential financial impact to patient.     Patient verbalize(s) understanding of discussion.     Pt didn't have any needs for SW.     JOSELIN Cornejo   [facility], Transitional Care Unit   Social Work   [facility] [place] [address]  (PH) [phone]

## 2023-07-24 NOTE — PROGRESS NOTES
Vital stable .  Last night notes reviewed .  No nursing concerns brought forward  Labs ; reviewed   Continue prior treatment plan.     Discussed with wife     Change multivitamin to regular   Increase mad oxide to 800 mg BID

## 2023-07-24 NOTE — PROGRESS NOTES
"   07/24/23 1201   Appointment Info   Signing Clinician's Name / Credentials (PT) Alba Hurst DPT   Rehab Comments (PT) PT: recertification   Quick Adds   Quick Adds Certification      Language English   Living Environment   People in Home spouse   Current Living Arrangements house   Home Accessibility stairs within home   Number of Stairs, Within Home, Primary eight;other (see comments)   Stair Railings, Within Home, Primary railing on right side (ascending)   Transportation Anticipated family or friend will provide   Self-Care   Usual Activity Tolerance moderate   Current Activity Tolerance fair   Equipment Currently Used at Home grab bar, tub/shower   General Information   Onset of Illness/Injury or Date of Surgery 06/05/23   Referring Physician Dr. Toni Sheth   Patient/Family Therapy Goals Statement (PT) To go home   Pertinent History of Current Problem (include personal factors and/or comorbidities that impact the POC) Taken Per Chart review: \"Damion Quinones is a 67 year old male with PMHx of decompensated alcohol + hemochromatosis (homozygous H63D) cirrhosis complicated by variceal bleeding s/p TIPS (10/1/2022) and hepatic encephalopathy + CKD (IgA nephropathy + ANCA vasculitis) s/p simultaneous liver kidney transplant on 6/6/2023. RTOR on 6/6/203 with concern for low flow in the renal graft - ex-lap, washout, closure with healthy appearing graft with intact arterial and venous flow.\"   Existing Precautions/Restrictions fall   Cognition   Affect/Mental Status (Cognition) WFL   Orientation Status (Cognition) oriented x 4   Follows Commands (Cognition) WFL   Pain Assessment   Patient Currently in Pain No   Integumentary/Edema   Integumentary/Edema   (Pt has ongoing gout in feet following surgery and reports some R low back pain, possibly kidney related)   Posture    Posture Forward head position   Range of Motion (ROM)   Range of Motion ROM is WFL   Strength (Manual Muscle Testing)   Strength " Comments PT: Pt is grossly 4/5 through B LE   Transfers   Comment, (Transfers) ind with front WW   Sit-Stand Transfer   Comment, (Sit-Stand Transfer) ind with front WW   Muscle Tone   Muscle Tone no deficits were identified   Clinical Impression   Criteria for Skilled Therapeutic Intervention Yes, treatment indicated   PT Diagnosis (PT) decreased endurance, strength, and tolerance for functional activity secondary to recent transplant   Influenced by the following impairments medical status   Planned Therapy Interventions (PT) balance training;bed mobility training;cryotherapy;E-stim;gait training;groups;home exercise program;joint mobilization;lumbar stabilization;manual therapy techniques;motor coordination training;neuromuscular re-education;orthotic fitting/training;patient/family education;postural re-education;prosthetic fitting/training;ROM (range of motion);stair training;strengthening;stretching;swiss ball techniques;TENS;thermotherapy;transfer training;progressive activity/exercise;risk factor education;wheelchair management/propulsion training;home program guidelines   Clinical Impression Comments PT: Pt is a 68 y/o male who presents for PT recertification after 4 weeks of being on TCU. Pt has demonstrated great improvement with functional mobility including being independent in his room with front WW, able to amb distances up to 150' and is progressing on stairs. He is nearing his goals and should be ready for discharge by the end of this week.   Therapy Certification   Start of care date 06/26/23   Certification date from 07/24/23   Certification date to 08/22/23   Medical Diagnosis S/P Liver and R kidney transplant   Physical Therapy Goals   PT Frequency 6x/week   PT Predicted Duration/Target Date for Goal Attainment 07/26/23   PT Goals Transfers;Bed Mobility;Gait;Stairs;Aerobic Activity;PT Goal 1;PT Goal 2   PT: Bed Mobility Goal Met   PT: Transfers Goal Met   PT: Gait Goal Met   PT: Stairs 8  "stairs;Minimal assist   PT: Perform aerobic activity with stable cardiovascular response 10 minutes;NuStep   PT: Goal 1 PT; Pt will complete car transfer with CGA in order to safely discharge under care of wife and access home.   PT: Goal 2 Pt will be mod I with front WW for 100'   Interventions   Interventions Quick Adds Therapeutic Activity;Therapeutic Procedure   PT Discharge Planning   PT Plan PT: strengthening of glute med (sidelying clamshells, clamshells w/theraband, sidelying bicycle, sidelying straight leg raise), stairs in gym with side step up on one HR, progressing to steps on 5th floor as able. If stairs too difficutly then side step up in // w/5\" step then progressing to 7\" step, bridging on mat   PT Discharge Recommendation (DC Rec) home with assist   PT Rationale for DC Rec PT: Pt lives in [place] with wife at baseline. Plan to return. Ramp to access main level, but has only commode and no bed. 8 stairs up to full bathroom and bed   PT Brief overview of current status PT:  Amb up to 110' bouts with CGA x 1, if spouse is present she will provide wc follow but it is not required, therapist can bring wc, squat pivot and stand pivot no AD, SBA, pt is mod I in room   Post Acute Settings Only   What unit is patient on? TCU       "

## 2023-07-24 NOTE — PROGRESS NOTES
Transplant Surgery  Immunosuppression Note    Damion Quinones is a 67 year old male with history of cirrhosis (alcohol + hemochromatosis), ESKD (IgA nephropathy + ANCA vasculitis), PAF, obesity, HTN, pre-diabetes, rheumatoid and psoriatic arthritis, and CAD. S/p simultaneous liver and kidney transplant on 6/6/2023 complicated by DGF, AF RVR, shock, gout flare, right internal jugular clot, and malnutrition. Transferred to TCU for ongoing rehabilitation on 6/25/23.     POD # 48    Liver transplant w/ complex reconstruction of accessory right hepatic artery: No biliary stent. Last liver US 7/5 with normal doppler. AST and ALT mildly increased today, still within normal limits.     Kidney transplant w/ stent, delayed graft function: 6/20 biopsy: ATN, no rejection. Cr stable at 1, improved.      Immunosuppression:  Induction: Steroid taper and Thymoglobulin 400 mg (4 mg/kg).   Maintenance:    -  mg BID -> Myfortic 540 mg BID d/t abdominal discomfort; can be decreased to 360 mg BID if symptoms persist.    - Tacrolimus 3 mg BID. Will increase goal level to ~8 per Dr. Clifton. 7/24 level 14.5 hr trough so will not change dose  - Prednisone pulse d/t gout flare with taper to 10 mg daily.   Infection prophylaxis: PJP (Bactrim), viral (Valcyte x 12 weeks)    Transplant coordinator: Cindy Clay (Liver), Angelita Torres (Kidney) [phone]  Donor type: DBD  DSA at time of transplant:  Yes CW9 6/5/23  Ureteral stent: Yes  Biliary stent: No    Plan:   - 7/24 level 6.6, 14.5 hr trough, no change in tacrolimus dosing    Katarina Alvarado PA-C  Liver Transplant Surgery [facility] [phone]

## 2023-07-25 ENCOUNTER — APPOINTMENT (OUTPATIENT)
Dept: PHYSICAL THERAPY | Facility: SKILLED NURSING FACILITY | Age: 68
End: 2023-07-25
Attending: INTERNAL MEDICINE
Payer: COMMERCIAL

## 2023-07-25 ENCOUNTER — APPOINTMENT (OUTPATIENT)
Dept: OCCUPATIONAL THERAPY | Facility: SKILLED NURSING FACILITY | Age: 68
End: 2023-07-25
Attending: INTERNAL MEDICINE
Payer: COMMERCIAL

## 2023-07-25 ENCOUNTER — MYC MEDICAL ADVICE (OUTPATIENT)
Dept: ANTICOAGULATION | Facility: CLINIC | Age: 68
End: 2023-07-25
Payer: COMMERCIAL

## 2023-07-25 ENCOUNTER — APPOINTMENT (OUTPATIENT)
Dept: EDUCATION SERVICES | Facility: CLINIC | Age: 68
End: 2023-07-25
Payer: COMMERCIAL

## 2023-07-25 LAB
GLUCOSE BLDC GLUCOMTR-MCNC: 131 MG/DL (ref 70–99)
GLUCOSE BLDC GLUCOMTR-MCNC: 131 MG/DL (ref 70–99)
GLUCOSE BLDC GLUCOMTR-MCNC: 244 MG/DL (ref 70–99)
GLUCOSE BLDC GLUCOMTR-MCNC: 88 MG/DL (ref 70–99)
HOLD SPECIMEN: NORMAL
INR PPP: 2.3 (ref 0.85–1.15)
MAGNESIUM SERPL-MCNC: 1.5 MG/DL (ref 1.7–2.3)
MAGNESIUM SERPL-MCNC: 2.3 MG/DL (ref 1.7–2.3)
PHOSPHATE SERPL-MCNC: 2.8 MG/DL (ref 2.5–4.5)
POTASSIUM SERPL-SCNC: 3.9 MMOL/L (ref 3.4–5.3)

## 2023-07-25 PROCEDURE — 250N000013 HC RX MED GY IP 250 OP 250 PS 637: Performed by: INTERNAL MEDICINE

## 2023-07-25 PROCEDURE — 84100 ASSAY OF PHOSPHORUS: CPT | Performed by: INTERNAL MEDICINE

## 2023-07-25 PROCEDURE — 250N000012 HC RX MED GY IP 250 OP 636 PS 637: Performed by: INTERNAL MEDICINE

## 2023-07-25 PROCEDURE — 250N000011 HC RX IP 250 OP 636: Mod: JZ | Performed by: INTERNAL MEDICINE

## 2023-07-25 PROCEDURE — 84132 ASSAY OF SERUM POTASSIUM: CPT | Performed by: INTERNAL MEDICINE

## 2023-07-25 PROCEDURE — 36415 COLL VENOUS BLD VENIPUNCTURE: CPT | Performed by: INTERNAL MEDICINE

## 2023-07-25 PROCEDURE — 120N000009 HC R&B SNF

## 2023-07-25 PROCEDURE — 97110 THERAPEUTIC EXERCISES: CPT | Mod: GP

## 2023-07-25 PROCEDURE — 83735 ASSAY OF MAGNESIUM: CPT | Performed by: INTERNAL MEDICINE

## 2023-07-25 PROCEDURE — 85610 PROTHROMBIN TIME: CPT | Performed by: INTERNAL MEDICINE

## 2023-07-25 PROCEDURE — 250N000012 HC RX MED GY IP 250 OP 636 PS 637: Performed by: NURSE PRACTITIONER

## 2023-07-25 PROCEDURE — 99207 PR NO BILLABLE SERVICE THIS VISIT: CPT | Performed by: INTERNAL MEDICINE

## 2023-07-25 PROCEDURE — G0463 HOSPITAL OUTPT CLINIC VISIT: HCPCS

## 2023-07-25 PROCEDURE — 97110 THERAPEUTIC EXERCISES: CPT | Mod: GO

## 2023-07-25 RX ORDER — MYCOPHENOLIC ACID 180 MG/1
540 TABLET, DELAYED RELEASE ORAL 2 TIMES DAILY
Qty: 60 TABLET | Refills: 0 | Status: CANCELLED | OUTPATIENT
Start: 2023-07-25

## 2023-07-25 RX ORDER — PANTOPRAZOLE SODIUM 40 MG/1
40 TABLET, DELAYED RELEASE ORAL
Qty: 30 TABLET | Refills: 0 | Status: SHIPPED | OUTPATIENT
Start: 2023-07-26 | End: 2023-08-16

## 2023-07-25 RX ORDER — ATORVASTATIN CALCIUM 20 MG/1
20 TABLET, FILM COATED ORAL EVERY EVENING
Qty: 30 TABLET | Refills: 0 | Status: SHIPPED | OUTPATIENT
Start: 2023-07-25 | End: 2023-08-16

## 2023-07-25 RX ORDER — BLOOD PRESSURE TEST KIT
KIT MISCELLANEOUS
Qty: 100 EACH | Refills: 0 | Status: SHIPPED | OUTPATIENT
Start: 2023-07-25

## 2023-07-25 RX ORDER — WARFARIN SODIUM 1 MG/1
1 TABLET ORAL DAILY
Qty: 30 TABLET | Refills: 0 | Status: CANCELLED | OUTPATIENT
Start: 2023-07-25

## 2023-07-25 RX ORDER — MAGNESIUM OXIDE 400 MG/1
800 TABLET ORAL 2 TIMES DAILY
Qty: 60 TABLET | Refills: 0 | Status: CANCELLED | OUTPATIENT
Start: 2023-07-25

## 2023-07-25 RX ORDER — MULTIPLE VITAMINS W/ MINERALS TAB 9MG-400MCG
1 TAB ORAL DAILY
Qty: 30 TABLET | Refills: 0 | Status: CANCELLED | OUTPATIENT
Start: 2023-07-26

## 2023-07-25 RX ORDER — ONDANSETRON 4 MG/1
4 TABLET, ORALLY DISINTEGRATING ORAL EVERY 6 HOURS PRN
Qty: 15 TABLET | Refills: 0 | Status: CANCELLED | OUTPATIENT
Start: 2023-07-25

## 2023-07-25 RX ORDER — NYSTATIN 100000/ML
500000 SUSPENSION, ORAL (FINAL DOSE FORM) ORAL 4 TIMES DAILY
Qty: 280 ML | Refills: 0 | Status: SHIPPED | OUTPATIENT
Start: 2023-07-25 | End: 2023-08-01

## 2023-07-25 RX ORDER — VALGANCICLOVIR 450 MG/1
450 TABLET, FILM COATED ORAL DAILY
Qty: 30 TABLET | Refills: 0 | Status: SHIPPED | OUTPATIENT
Start: 2023-07-25 | End: 2023-08-01

## 2023-07-25 RX ORDER — METHOCARBAMOL 500 MG/1
500 TABLET, FILM COATED ORAL 4 TIMES DAILY PRN
Qty: 30 TABLET | Refills: 0 | Status: SHIPPED | OUTPATIENT
Start: 2023-07-25 | End: 2023-08-01

## 2023-07-25 RX ORDER — POLYETHYLENE GLYCOL 3350 17 G/17G
17 POWDER, FOR SOLUTION ORAL DAILY
Qty: 510 G | Refills: 0 | Status: SHIPPED | OUTPATIENT
Start: 2023-07-25 | End: 2023-08-01

## 2023-07-25 RX ORDER — METHOCARBAMOL 500 MG/1
500 TABLET, FILM COATED ORAL 4 TIMES DAILY PRN
Qty: 30 TABLET | Refills: 0 | Status: CANCELLED | OUTPATIENT
Start: 2023-07-25

## 2023-07-25 RX ORDER — WARFARIN SODIUM 1 MG/1
1 TABLET ORAL
Status: COMPLETED | OUTPATIENT
Start: 2023-07-25 | End: 2023-07-25

## 2023-07-25 RX ORDER — MYCOPHENOLIC ACID 180 MG/1
540 TABLET, DELAYED RELEASE ORAL 2 TIMES DAILY
Qty: 60 TABLET | Refills: 0 | Status: SHIPPED | OUTPATIENT
Start: 2023-07-25 | End: 2023-08-02

## 2023-07-25 RX ORDER — PREDNISONE 10 MG/1
10 TABLET ORAL DAILY
Qty: 30 TABLET | Refills: 0 | Status: CANCELLED | OUTPATIENT
Start: 2023-07-26

## 2023-07-25 RX ORDER — SULFAMETHOXAZOLE AND TRIMETHOPRIM 400; 80 MG/1; MG/1
1 TABLET ORAL DAILY
Qty: 30 TABLET | Refills: 0 | Status: CANCELLED | OUTPATIENT
Start: 2023-07-26

## 2023-07-25 RX ORDER — ATORVASTATIN CALCIUM 20 MG/1
20 TABLET, FILM COATED ORAL EVERY EVENING
Qty: 30 TABLET | Refills: 0 | Status: CANCELLED | OUTPATIENT
Start: 2023-07-25

## 2023-07-25 RX ORDER — BUMETANIDE 1 MG/1
1 TABLET ORAL 2 TIMES DAILY
Status: DISCONTINUED | OUTPATIENT
Start: 2023-07-25 | End: 2023-07-27 | Stop reason: HOSPADM

## 2023-07-25 RX ORDER — PREDNISONE 10 MG/1
10 TABLET ORAL DAILY
Qty: 30 TABLET | Refills: 0 | Status: SHIPPED | OUTPATIENT
Start: 2023-07-26 | End: 2023-08-16

## 2023-07-25 RX ORDER — METOPROLOL TARTRATE 25 MG/1
12.5 TABLET, FILM COATED ORAL EVERY 8 HOURS
Qty: 60 TABLET | Refills: 0 | Status: SHIPPED | OUTPATIENT
Start: 2023-07-25 | End: 2023-08-01

## 2023-07-25 RX ORDER — TACROLIMUS 1 MG/1
3 CAPSULE ORAL 2 TIMES DAILY
Qty: 60 CAPSULE | Refills: 0 | Status: SHIPPED | OUTPATIENT
Start: 2023-07-25 | End: 2023-08-01

## 2023-07-25 RX ORDER — BENZOCAINE/MENTHOL 6 MG-10 MG
LOZENGE MUCOUS MEMBRANE 2 TIMES DAILY
Qty: 30 G | Refills: 0 | Status: CANCELLED | OUTPATIENT
Start: 2023-07-25

## 2023-07-25 RX ORDER — BENZOCAINE/MENTHOL 6 MG-10 MG
LOZENGE MUCOUS MEMBRANE 2 TIMES DAILY
Qty: 20 G | Refills: 0 | Status: SHIPPED | OUTPATIENT
Start: 2023-07-25 | End: 2023-08-29

## 2023-07-25 RX ORDER — ASPIRIN 325 MG
325 TABLET ORAL DAILY
Qty: 30 TABLET | Refills: 0 | Status: SHIPPED | OUTPATIENT
Start: 2023-07-26 | End: 2023-08-16

## 2023-07-25 RX ORDER — VALGANCICLOVIR 450 MG/1
450 TABLET, FILM COATED ORAL DAILY
Qty: 30 TABLET | Refills: 0 | Status: CANCELLED | OUTPATIENT
Start: 2023-07-25

## 2023-07-25 RX ORDER — TACROLIMUS 1 MG/1
3 CAPSULE ORAL 2 TIMES DAILY
Qty: 60 CAPSULE | Refills: 0 | Status: CANCELLED | OUTPATIENT
Start: 2023-07-25

## 2023-07-25 RX ORDER — ALLOPURINOL 100 MG/1
100 TABLET ORAL DAILY
Qty: 30 TABLET | Refills: 0 | Status: SHIPPED | OUTPATIENT
Start: 2023-07-26 | End: 2023-08-11

## 2023-07-25 RX ORDER — SULFAMETHOXAZOLE AND TRIMETHOPRIM 400; 80 MG/1; MG/1
1 TABLET ORAL DAILY
Qty: 30 TABLET | Refills: 0 | Status: SHIPPED | OUTPATIENT
Start: 2023-07-26 | End: 2023-08-16

## 2023-07-25 RX ORDER — BUMETANIDE 1 MG/1
1 TABLET ORAL 2 TIMES DAILY
Qty: 60 TABLET | Refills: 0 | Status: CANCELLED | OUTPATIENT
Start: 2023-07-25

## 2023-07-25 RX ORDER — MULTIPLE VITAMINS W/ MINERALS TAB 9MG-400MCG
1 TAB ORAL DAILY
Qty: 30 TABLET | Refills: 0 | Status: SHIPPED | OUTPATIENT
Start: 2023-07-26 | End: 2023-08-16

## 2023-07-25 RX ORDER — METOPROLOL TARTRATE 25 MG/1
12.5 TABLET, FILM COATED ORAL EVERY 8 HOURS
Qty: 60 TABLET | Refills: 0 | Status: CANCELLED | OUTPATIENT
Start: 2023-07-25

## 2023-07-25 RX ORDER — MAGNESIUM OXIDE 400 MG/1
800 TABLET ORAL 2 TIMES DAILY
Qty: 60 TABLET | Refills: 0 | Status: SHIPPED | OUTPATIENT
Start: 2023-07-25 | End: 2023-08-04

## 2023-07-25 RX ORDER — ALLOPURINOL 100 MG/1
100 TABLET ORAL DAILY
Qty: 30 TABLET | Refills: 0 | Status: CANCELLED | OUTPATIENT
Start: 2023-07-26

## 2023-07-25 RX ORDER — POTASSIUM CHLORIDE 1500 MG/1
40 TABLET, EXTENDED RELEASE ORAL 2 TIMES DAILY
Qty: 60 TABLET | Refills: 0 | Status: SHIPPED | OUTPATIENT
Start: 2023-07-25 | End: 2023-08-01

## 2023-07-25 RX ORDER — BUMETANIDE 1 MG/1
1 TABLET ORAL 2 TIMES DAILY
Qty: 60 TABLET | Refills: 0 | Status: SHIPPED | OUTPATIENT
Start: 2023-07-25 | End: 2023-08-01

## 2023-07-25 RX ORDER — POLYETHYLENE GLYCOL 3350 17 G/17G
17 POWDER, FOR SOLUTION ORAL DAILY
Qty: 510 G | Refills: 0 | Status: CANCELLED | OUTPATIENT
Start: 2023-07-25

## 2023-07-25 RX ORDER — CONTAINER,EMPTY
EACH MISCELLANEOUS
Qty: 1 EACH | Refills: 0 | Status: SHIPPED | OUTPATIENT
Start: 2023-07-25

## 2023-07-25 RX ORDER — WARFARIN SODIUM 1 MG/1
1 TABLET ORAL DAILY
Qty: 30 TABLET | Refills: 0 | Status: SHIPPED | OUTPATIENT
Start: 2023-07-25 | End: 2023-08-16

## 2023-07-25 RX ORDER — ASPIRIN 325 MG
325 TABLET ORAL DAILY
Qty: 30 TABLET | Refills: 0 | Status: CANCELLED | OUTPATIENT
Start: 2023-07-26

## 2023-07-25 RX ORDER — URSODIOL 250 MG/1
250 TABLET, FILM COATED ORAL 2 TIMES DAILY
Qty: 60 TABLET | Refills: 0 | Status: SHIPPED | OUTPATIENT
Start: 2023-07-25 | End: 2023-08-16

## 2023-07-25 RX ORDER — NYSTATIN 100000/ML
500000 SUSPENSION, ORAL (FINAL DOSE FORM) ORAL 4 TIMES DAILY
Qty: 280 ML | Refills: 0 | Status: CANCELLED | OUTPATIENT
Start: 2023-07-25 | End: 2023-08-08

## 2023-07-25 RX ORDER — MAGNESIUM SULFATE HEPTAHYDRATE 40 MG/ML
4 INJECTION, SOLUTION INTRAVENOUS ONCE
Status: COMPLETED | OUTPATIENT
Start: 2023-07-25 | End: 2023-07-25

## 2023-07-25 RX ORDER — PANTOPRAZOLE SODIUM 40 MG/1
40 TABLET, DELAYED RELEASE ORAL
Qty: 30 TABLET | Refills: 0 | Status: CANCELLED | OUTPATIENT
Start: 2023-07-26

## 2023-07-25 RX ADMIN — PREDNISONE 10 MG: 5 TABLET ORAL at 08:17

## 2023-07-25 RX ADMIN — MULTIPLE VITAMINS W/ MINERALS TAB 1 TABLET: TAB at 08:17

## 2023-07-25 RX ADMIN — Medication 12.5 MG: at 21:24

## 2023-07-25 RX ADMIN — NYSTATIN 500000 UNITS: 500000 SUSPENSION ORAL at 13:22

## 2023-07-25 RX ADMIN — BUMETANIDE 2 MG: 1 TABLET ORAL at 08:17

## 2023-07-25 RX ADMIN — VALGANCICLOVIR HYDROCHLORIDE 450 MG: 450 TABLET ORAL at 20:26

## 2023-07-25 RX ADMIN — INSULIN ASPART 9 UNITS: 100 INJECTION, SOLUTION INTRAVENOUS; SUBCUTANEOUS at 09:08

## 2023-07-25 RX ADMIN — INSULIN ASPART 3 UNITS: 100 INJECTION, SOLUTION INTRAVENOUS; SUBCUTANEOUS at 14:04

## 2023-07-25 RX ADMIN — MAGNESIUM OXIDE TAB 400 MG (241.3 MG ELEMENTAL MG) 800 MG: 400 (241.3 MG) TAB at 13:22

## 2023-07-25 RX ADMIN — POTASSIUM CHLORIDE 40 MEQ: 750 TABLET, EXTENDED RELEASE ORAL at 20:27

## 2023-07-25 RX ADMIN — ASPIRIN 325 MG: 325 TABLET, FILM COATED ORAL at 08:17

## 2023-07-25 RX ADMIN — URSODIOL 250 MG: 250 TABLET ORAL at 20:26

## 2023-07-25 RX ADMIN — NYSTATIN 500000 UNITS: 500000 SUSPENSION ORAL at 20:27

## 2023-07-25 RX ADMIN — INSULIN GLARGINE 5 UNITS: 100 INJECTION, SOLUTION SUBCUTANEOUS at 21:23

## 2023-07-25 RX ADMIN — MAGNESIUM SULFATE HEPTAHYDRATE 4 G: 40 INJECTION, SOLUTION INTRAVENOUS at 11:01

## 2023-07-25 RX ADMIN — POTASSIUM CHLORIDE 40 MEQ: 750 TABLET, EXTENDED RELEASE ORAL at 08:17

## 2023-07-25 RX ADMIN — MYCOPHENOLIC ACID 540 MG: 180 TABLET, DELAYED RELEASE ORAL at 08:17

## 2023-07-25 RX ADMIN — NYSTATIN 500000 UNITS: 500000 SUSPENSION ORAL at 08:16

## 2023-07-25 RX ADMIN — SULFAMETHOXAZOLE AND TRIMETHOPRIM 1 TABLET: 400; 80 TABLET ORAL at 08:17

## 2023-07-25 RX ADMIN — TACROLIMUS 3 MG: 1 CAPSULE ORAL at 18:13

## 2023-07-25 RX ADMIN — INSULIN ASPART 5 UNITS: 100 INJECTION, SOLUTION INTRAVENOUS; SUBCUTANEOUS at 18:17

## 2023-07-25 RX ADMIN — SIMETHICONE 80 MG: 80 TABLET, CHEWABLE ORAL at 19:08

## 2023-07-25 RX ADMIN — PANTOPRAZOLE SODIUM 40 MG: 40 TABLET, DELAYED RELEASE ORAL at 06:11

## 2023-07-25 RX ADMIN — ALLOPURINOL 100 MG: 100 TABLET ORAL at 08:17

## 2023-07-25 RX ADMIN — URSODIOL 250 MG: 250 TABLET ORAL at 08:17

## 2023-07-25 RX ADMIN — MAGNESIUM OXIDE TAB 400 MG (241.3 MG ELEMENTAL MG) 800 MG: 400 (241.3 MG) TAB at 20:26

## 2023-07-25 RX ADMIN — TACROLIMUS 3 MG: 1 CAPSULE ORAL at 08:17

## 2023-07-25 RX ADMIN — ATORVASTATIN CALCIUM 20 MG: 10 TABLET, FILM COATED ORAL at 20:27

## 2023-07-25 RX ADMIN — NYSTATIN 500000 UNITS: 500000 SUSPENSION ORAL at 16:09

## 2023-07-25 RX ADMIN — Medication 12.5 MG: at 06:11

## 2023-07-25 RX ADMIN — MYCOPHENOLIC ACID 540 MG: 180 TABLET, DELAYED RELEASE ORAL at 18:13

## 2023-07-25 RX ADMIN — BUMETANIDE 1 MG: 1 TABLET ORAL at 16:09

## 2023-07-25 RX ADMIN — INSULIN ASPART 5 UNITS: 100 INJECTION, SOLUTION INTRAVENOUS; SUBCUTANEOUS at 18:16

## 2023-07-25 RX ADMIN — WARFARIN SODIUM 1 MG: 1 TABLET ORAL at 18:13

## 2023-07-25 ASSESSMENT — ACTIVITIES OF DAILY LIVING (ADL)
ADLS_ACUITY_SCORE: 37
ADLS_ACUITY_SCORE: 39
ADLS_ACUITY_SCORE: 37
ADLS_ACUITY_SCORE: 39

## 2023-07-25 NOTE — PROGRESS NOTES
Vital stable .  Last night notes reviewed .  No nursing concerns brought forward  Labs ; noted   Will get mag replacement  Continue prior treatment plan.     Bumex being held for low BP intermittently  Will reduce dose to 1 mg BID ( from 2 ) as patient seems fluid status has improved   Discharge planning for Thursday 7/27

## 2023-07-25 NOTE — PLAN OF CARE
Pt is A&OX4, uses BSC with AX2, continent of both bowel and bladder, denied CP, SOB and N&V, eat regular diet, take meds whole with thin liquid,PIV line was flushed on a carbs count. Pt is in chair relaxing. Will continue plan of care.            Patient's most recent vital signs are:     Vital signs:  BP: 109/67  Temp: 98.1  HR: 77  RR: 18  SpO2: 99 %     Patient does not have new respiratory symptoms.  Patient does not have new sore throat.  Patient does not have a fever greater than 99.5.

## 2023-07-25 NOTE — CARE PLAN
Patient's most recent vital signs are:     Vital signs:  BP: 111/69  Temp: 98.1  HR: 101  RR: 18  SpO2: 94 %     Patient does not have new respiratory symptoms.  Patient does not have new sore throat.  Patient does not have a fever greater than 99.5.     RN: Pt makes needs known.  Mod I in room without problems. Declined bowel meds. Denies pain. Magnesium 1.5, will receive IV replacement at 1100 per pt request between therapies. Dressng right abd clean dry and intact.   Bumex dose decreased from 2 mg to 1 mg BID.  WOC consult made for left foot heel, very tender to touch, no discoloration, pin sized hardened area palpated in center. Heels elevated on pillow with heels suspended.    Mg check at 1600.  Metoprolol held this afternoon, order parameters not met, .  Pt asymp.

## 2023-07-25 NOTE — CONSULTS
Met with Casey and his wife Apoorva for glucose monitoring and insulin administration education. Demonstrated on a model checking his blood glucose using a demo One Touch Verio meter as well as how to give his insulin using an insulin pen. We also discussed when to check his blood sugar, the difference between long and short acting insulin, signs and symptoms of high and low blood sugar, how to treat low blood sugar, diet, healthy injection sites, proper sharps disposal, and when to call with concerns. Both Casey and Apoorva were able to return demonstrate checking a blood sugar on the demo meter as well as calculating the correct insulin dose of rapid acting insulin using a sample sliding scale and administering the insulin using the demo insulin pen. They were engaged in education and were able to return demonstrate all skills without difficulty. Both Casey and Apoorva verbalized understanding of the information given.     Literature Given: Understanding Diabetes, Long-Acting Insulin, Rapid-Acting Insulin, Sharps Disposal, and Healthy Injections Sites

## 2023-07-25 NOTE — PROGRESS NOTES
MDS Pain Assessment    The following is the pain interview as conducted by the TCU RN caring for the patient on July / 26 / 2023. This assessment is required by the [facility] for all patients in [place] SNF (Skilled Nursing Facilities).       1. Have you had pain or hurting at any time in the last 5 days? Yes    2. How much of the time have you experienced pain or hurting over the last 5 days? almost constantly    3. Over the past 5 days, has pain made it hard for you to sleep at night? No    4. Over the past 5 days, have you limited your day-to-day activities because of pain? No    5. Pain intensity (Numeric scale used first-if patient unable to answer, verbal scale to be used.)    Numeric Scale: Please rate your worst pain over the last 5 days on a zero to ten scale, with zero being no pain and ten being the worst pain you can imagine.     3    Verbal Scale: USED ONLY if unable to give numeric, otherwise N/A  Please rate the intensity of your worst pain over the last 5 days.     mild

## 2023-07-25 NOTE — PLAN OF CARE
FOCUS/GOAL  Bowel management, Bladder management, Nutrition/Feeding/Swallowing precautions, Medication management, Medical management, Mobility and Cognition/Memory/Judgment/Problem solving    ASSESSMENT, INTERVENTIONS AND CONTINUING PLAN FOR GOAL:    Goal Outcome Evaluation:     Overall Pt had no acute issue this shift. Pt is alert and oriented x 4. Able to make needs known to staffs. Pt denied having chest pain, SOB, N/V, fever and chills. Pt is MOD I in room this shift. PIV flushed and patent. Appears comfortable at this time. Will continue with POC.       Patient's most recent vital signs are:     Vital signs:  BP: 107/64  Temp: 98.1  HR: 107  RR: 18  SpO2: 99 %     Patient does not have new respiratory symptoms.  Patient does not have new sore throat.  Patient does not have a fever greater than 99.5.

## 2023-07-25 NOTE — CONSULTS
[facility] Transitional Care  [facility] Nurse Inpatient Assessment     Consulted for: abdomen  7/25: new consult for left heel    Patient History (according to provider note(s):      Damion Quinones is a 67 year old male admitted on 6/25/2023 for ongoing rehabilitation after hospitalization at [facility].    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   He underwent simultaneous liver kidney transplant on 6/6/2023. RTOR on 6/6/203 with concern for low flow in the renal graft - ex-lap, washout, closure with healthy appearing graft with intact arterial and venous flow. He is hospital course was also complicated by renal failure requiring CRRT and intermittent hemodialysis, RIJ clot, atrial fibrillation, hypertension, fluid overload, hypoxemia, anemia, leukocytosis, malnutrition, steroids/tube feed induced hyperglycemia, gout and deconditioning.  For details of hospitalization please refer to the discharge summary note on 6/25/2023.       Assessment:      Areas visualized during today's visit: Abdomen    Wound location: abdomen not assessed 7/25, next assessment early next week    Last photo: 7/24  Wound due to: Surgical Wound  Wound history/plan of care: nonhealing surgical incision. Several areas of dried drainage that can be left open to air.   Spouse at bedside and instructed on wound care  Wound base: 100 % fibrin,      Palpation of the wound bed: normal      Drainage: small     Description of drainage: serous     Measurements (length x width x depth, in cm): 1.2  x 2.3  x  0.3 cm      Tunneling: N/A     Undermining: N/A  Periwound skin: Intact      Color: normal and consistent with surrounding tissue      Temperature: normal   Odor: none  Pain: denies , none  Pain interventions prior to dressing change: no significant pain  "present   Treatment goal: Heal , Infection control/prevention, and Increase granulation  STATUS: initial assessment  Supplies ordered: discussed with RN and discussed with patient     Wound location: left heel    7/25  Last photo: 7/25/23  Wound due to: Unknown Etiology, suspect gout  Wound history/plan of care: Area not consistent with pressure injury at this time. Healed blister noted. Pt has history of gout, most recently after transplant. Pt noticed after recent prednisone taper increase in heel pain. Right hand also has swelling and pain. Whitish pinpoint areas may be consistent with gout.   Wound base: 100 % epidermis,  with possible tophi under the skin     Palpation of the wound bed: normal      Drainage: none     Description of drainage: none     Measurements (length x width x depth, in cm): see photo  Periwound skin: Intact      Color: pink      Temperature: normal   Odor: none  Pain: severe, sharp and tender  Pain interventions prior to dressing change: slow and gentle cares   Treatment goal: Protection  STATUS: initial assessment  Supplies ordered: discussed with RN and discussed with patient       Treatment Plan:     Right abdomen wound(s): Daily   Cleanse with wound cleanser, pat dry.   Cut Aquacel Ag to slightly wider than wound bed.  Fold Aquacel Ag into wound bed.  Cover with mepilex.   Intact scabs: leave in place. OK to shower. May cleanse with wound cleanser and pat dry, leave open to air. If loosen, ok to trim if needed but do not force or \"pick\" at scab. If scabs come off and wound is open, use above wound care until healed.     Left heel: no topical dressing needed    Orders: Written    RECOMMEND PRIMARY TEAM ORDER: None, at this time  Education provided: plan of care, wound progress, and Infection prevention   Discussed plan of care with: Patient, Family, and Nurse  WOC nurse follow-up plan: weekly  Notify WOC if wound(s) deteriorate.  Nursing to notify the Provider(s) and re-consult the WOC " Nurse if new skin concern.    DATA:     Current support surface: Standard  Standard gel/foam mattress (IsoFlex, Atmos air, etc)  Containment of urine/stool: Continent of bladder and Continent of bowel  BMI: Body mass index is 34.07 kg/m .   Active diet order: Orders Placed This Encounter      Regular Diet Adult     Output: I/O last 3 completed shifts:  In: 1080 [P.O.:1080]  Out: 1400 [Urine:1400]     Labs:   Recent Labs   Lab 07/25/23  0634 07/24/23  0801 07/21/23  0706 07/20/23 2034   ALBUMIN  --  3.0*   < >  --    HGB  --   --   --  7.3*   INR 2.30* 2.28*   < >  --    WBC  --   --   --  8.7    < > = values in this interval not displayed.       Pressure injury risk assessment:   Sensory Perception: 3-->slightly limited  Moisture: 4-->rarely moist  Activity: 3-->walks occasionally  Mobility: 3-->slightly limited  Nutrition: 3-->adequate  Friction and Shear: 3-->no apparent problem  Jamil Score: 19    Sera Huston RN CWOCN  Pager no longer is use, please contact through [address] group: [facility]  Dept. Office Number: [phone]

## 2023-07-25 NOTE — DISCHARGE SUMMARY
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 INR goal is 1.5-2.0 due to risk of bleed  coronary artery disease  paroxysmal atrial fibrillation  hyperglycemia secondary to steroids and tube feedings  psoriasis arthritis  gout. Arthritis  Hypokalemia  hypo magnesium anemia  hypoalbuminemia  moderate malnutrition    Clinically Significant Risk Factors     # Moderate Malnutrition: based on nutrition assessment      Follow-ups Needed After Discharge           Discharge Disposition   Discharged to home  Condition at discharge: Stable    Hospital Course   Patient is a 66 y/o man who has a past medical history significant for decompensated liver cirrhosis secondary to alcohol and hemachromatosis (homozygous H63D) complicated by variceal bleed requiring TIPS procedure and hepatic encephalopathy. Patient also had end-stage renal disease secondary to IgA nephropathy and ANCA vasculitis.      Patient had been hospitalized from 05-Jun-2023 to 25-Jun-2023 for end-stage liver disease and end-stage renal disease. On 06-Jun-2023, patient underwent simultaneous liver and kidney transplant. On 06-Jun-2023, patient returned to the OR with concern for low flow in the renal graft - patient underwent ex-lap, washout, closure with healthy appearing graft with intact arterial and venous flow. Hospital course of was also compliucated by acute kidney injury requiring CRRT and intermittent hemodialysis, right internal jugular clot, atrial fibrillation, hypertension, fluid overload, hypoxemia, anemia, leucocytosis, malnutrition, gout,  deconditioning and hyperglycemia secondary to steroids and/or tube feeds. Patient was discharged to TCU on 25-Jun-2023.     On 15-Chris-2023, patient underwent ultrasound of bilateral upper extremities that showed persistent nonocclusive thrombus in the lower right internal jugular vein similar to comparison ultrasound on 03-Mar-2023 and resolved right axillary thrombus.      During hospital stay, patient had been positive for hepatitis B surface antigen on 05-Jun-2023 but was not previously positive on 26-Jan-2023. Hepatitis B virus DNA was negative on 05-Jun-2023 and patient had no clear at risk behaviour. Repeat hepatitis B surface antigen and HBV was not detected. The positive hepatitis B surface antigen on 05-Jun-2023 is suspected to be a false positive.          P:  1.) Liver cirrhosis secondary to alcohol and hemachromatosis s/p liver transplant; end-stage renal disease secondary to IgA nephropathy and ANCA vasculitis:  - Patient on mycophenolic acid, tacrolimus and prednisone.  - Patient on prophylactic bactrim and valganciclovir.      2.) Acute on chronic anemia:  - Monitoring blood counts.  - Transfusion if Hb is below 7.     3.) Leucocytosis, resolved:  - Monitoring for evidence of infection.     4.) Lower extremity edema:  - Patient on bumex. Monitoring response.  Reduced to 1 mg BID     5.) Right internal jugular thrombus:  - Patient on coumadin for anticoagulation with goal INR 1.5-2.0 due to risk of bleeding.  - INR at goal and patient no longer on heparin.     6.) Coronary artery disease:  - Patient on aspirin, atorvastatin and metoprolol.     7.) Paroxysmal atrial fibrillation:  - Patient on metoprolol. Patient already on coumdin for anticoagulation with goal INR 1.5-2.0     8.) Hyperglycemia secondary to steroids, tube feeds or both:  - Patient on lantus 5 units nightly. Patient on novolog 1 unit per 12 gm of carbohydrates with meals. Patient on insulin sliding scale.     9.) Psoriatic arthritis:  -  Patient was on prednisone 10 mg daily prior to hospitalization and remains on prednisone 10 mg daily.     10.) Gout:   - Patient on allopurinol.  - Patient to f/u with Rheumatology as scheduled.     11.) Hypokalemia:  - Supplement.                 Consultations This Hospital Stay   PHARMACY IP CONSULT  PHARMACY TO DOSE WARFARIN  PHYSICAL THERAPY ADULT IP CONSULT  OCCUPATIONAL THERAPY ADULT IP CONSULT  NUTRITION SERVICES ADULT IP CONSULT  INTERVENTIONAL RADIOLOGY ADULT/PEDS IP CONSULT  VASCULAR ACCESS ADULT IP CONSULT  WOUND OSTOMY CONTINENCE NURSE  IP CONSULT  DIABETES EDUCATION IP CONSULT  PHARMACY LIAISON FOR MEDICATION COVERAGE CONSULT  WOUND OSTOMY CONTINENCE NURSE  IP CONSULT    Code Status   Full Code    Time Spent on this Encounter   I, Lashay Rachel MD, personally saw the patient today and spent greater than 30 minutes discharging this patient.       Lashay Rachel MD  [facility] TRANSITIONAL CARE UNIT [address] [address]  Phone: [phone]  ______________________________________________________________________    Physical Exam   Vital Signs: Temp: 98.1  F (36.7  C) Temp src: Oral BP: 111/69 Pulse: 101   Resp: 18 SpO2: 94 % O2 Device: None (Room air)    Weight: 217 lbs 8 oz  General Appearance: Alert and interactive   Respiratory: clear BL  Cardiovascular: s1 and s2  GI: soft , non tender , bs positive   Skin:no jaundice   Other: Ap mood and affect         Primary Care Physician   Gary Serrano    Discharge Orders      Home Care Referral      Physical Therapy Referral      ANTICOAGULATION CLINIC REFERRAL      Follow Up ([facility])    Follow up with Neurology for BLE neuropathic pain. Sp LK transplant. 1-2 weeks.     Appointments on [place] and/or [facility] (with [facility] or [facility] provider or service). Call [phone] if you haven't heard regarding these appointments within 7 days of discharge.     Reason for your hospital stay    You were  admitted for rehab to TCU     Activity    Your activity upon discharge: activity as tolerated     Adult [facility] Follow-up and recommended labs and tests    Follow up with primary care provider, Gary Serrano, within 7 days for hospital follow- up.    Follow up with transplant as directed     Appointments on [place] and/or [facility] (with [facility] or [facility] provider or service). Call [phone] if you haven't heard regarding these appointments within 7 days of discharge.     Follow Up and recommended labs and tests    INR on 7/ 29   Coumadin clinic to dose warfarin     Diet    Follow this diet upon discharge: Orders Placed This Encounter      Snacks/Supplements Adult: Other; Please allow pt/RN to order snacks/supplements PRN; Between Meals      Regular Diet Adult       Significant Results and Procedures   Most Recent 3 CBC's:  Recent Labs   Lab Test 07/20/23  2034 07/19/23  0555 07/17/23  0540   WBC 8.7 7.4 8.2   HGB 7.3* 7.4* 7.4*   MCV 96 98 97    204 172     Most Recent 3 BMP's:  Recent Labs   Lab Test 07/26/23  0737 07/26/23  0556 07/25/23  2105 07/25/23  0733 07/25/23  0634 07/24/23  1304 07/24/23  0801 07/21/23  2110 07/21/23  1707   NA  --  138  --   --   --   --  137  --  133*   POTASSIUM  --  4.2  --   --  3.9  --  3.9   < > 4.3  4.3   CHLORIDE  --  100  --   --   --   --  100  --  94*   CO2  --  24  --   --   --   --  26  --  28   BUN  --  23.1*  --   --   --   --  20.1  --  27.5*   CR  --  1.08  --   --   --   --  1.01  --  1.05   ANIONGAP  --  14  --   --   --   --  11  --  11   NAZARIO  --  9.0  --   --   --   --  8.8  --  8.7*   GLC 97 99 131*   < >  --    < > 120*   < > 209*    < > = values in this interval not displayed.     Most Recent 2 LFT's:  Recent Labs   Lab Test 07/26/23  0556 07/24/23  0801   AST 24 39   ALT 45 52   ALKPHOS 219* 221*   BILITOTAL 0.6 0.5     Most Recent 3 INR's:  Recent Labs   Lab Test 07/26/23  0556 07/25/23  0634 07/24/23  0801   INR 2.23* 2.30* 2.28*        Discharge Medications   Current Discharge Medication List        START taking these medications    Details   Alcohol Swabs PADS Use to swab the area of the injection or quyen as directed Per insurance coverage  Qty: 100 each, Refills: 0    Associated Diagnoses: Liver transplant recipient (H)      blood glucose (NO BRAND SPECIFIED) lancets standard To use to test glucose level in the blood Use to test blood sugar  4  times daily as directed. To accompany glucose monitor brands per insurance coverage.  Qty: 100 each, Refills: 0    Associated Diagnoses: Liver transplant recipient (H)      blood glucose (NO BRAND SPECIFIED) test strip To use to test glucose level in the blood Use to test blood sugar  4 times daily as directed. To accompany glucose monitor brands per insurance coverage.  Qty: 4 strip, Refills: 1    Associated Diagnoses: Liver transplant recipient (H)      blood glucose monitoring (NO BRAND SPECIFIED) meter device kit Use as directed Per insurance coverage  Qty: 1 kit, Refills: 0    Associated Diagnoses: Liver transplant recipient (H)      hydrocortisone (CORTAID) 1 % external cream Apply topically 2 times daily  Qty: 20 g, Refills: 0    Associated Diagnoses: Liver transplant recipient (H)      insulin pen needle (32G X 4 MM) 32G X 4 MM miscellaneous Use as directed by provider Per insurance coverage  Qty: 100 each, Refills: 0    Associated Diagnoses: Liver transplant recipient (H)      multivitamin w/minerals (THERA-VIT-M) tablet Take 1 tablet by mouth daily  Qty: 30 tablet, Refills: 0    Associated Diagnoses: Liver transplant recipient (H)      MYFORTIC (BRAND) 180 MG EC tablet Take 3 tablets (540 mg) by mouth 2 times daily  Qty: 60 tablet, Refills: 0    Associated Diagnoses: Liver transplant recipient (H)      potassium chloride ER (KLOR-CON M) 20 MEQ CR tablet Take 2 tablets (40 mEq) by mouth 2 times daily  Qty: 60 tablet, Refills: 0    Associated Diagnoses: Liver transplant recipient (H)       predniSONE (DELTASONE) 10 MG tablet Take 1 tablet (10 mg) by mouth daily  Qty: 30 tablet, Refills: 0    Associated Diagnoses: Liver transplant recipient (H)      Sharps Container MISC Use as directed to dispose of needles, lancets and other sharps  Qty: 1 each, Refills: 0    Associated Diagnoses: Liver transplant recipient (H)           CONTINUE these medications which have CHANGED    Details   allopurinol (ZYLOPRIM) 100 MG tablet Take 1 tablet (100 mg) by mouth daily  Qty: 30 tablet, Refills: 0    Associated Diagnoses: Liver transplant recipient (H)      aspirin (ASA) 325 MG tablet 1 tablet (325 mg) by Oral or Feeding Tube route daily  Qty: 30 tablet, Refills: 0    Associated Diagnoses: Liver transplant recipient (H)      atorvastatin (LIPITOR) 20 MG tablet 1 tablet (20 mg) by Oral or Feeding Tube route every evening  Qty: 30 tablet, Refills: 0    Associated Diagnoses: Liver transplant recipient (H)      bumetanide (BUMEX) 1 MG tablet Take 1 tablet (1 mg) by mouth 2 times daily  Qty: 60 tablet, Refills: 0    Associated Diagnoses: Liver transplant recipient (H)      diclofenac (VOLTAREN) 1 % topical gel Apply 4 g topically 3 times daily  Qty: 50 g, Refills: 0    Associated Diagnoses: Liver transplant recipient (H)      insulin aspart (NOVOLOG PEN) 100 UNIT/ML pen Inject 1 Units Subcutaneous 3 times daily (with meals) 1 unit of insulin with 12 grams of carb with breakfast / lunch and dinner .  Qty: 15 mL, Refills: 0    Comments: 1 unit with 12 grams of carbohydrate  Associated Diagnoses: Liver transplant recipient (H)      insulin glargine (LANTUS PEN) 100 UNIT/ML pen Inject 5 Units Subcutaneous At Bedtime  Qty: 15 mL, Refills: 0    Comments: If Lantus is not covered by insurance, may substitute Basaglar or Semglee or other insulin glargine product per insurance preference at same dose and frequency.    Associated Diagnoses: Liver transplant recipient (H)      magnesium oxide (MAG-OX) 400 MG tablet Take 2 tablets  (800 mg) by mouth 2 times daily  Qty: 60 tablet, Refills: 0    Associated Diagnoses: Liver transplant recipient (H)      methocarbamol (ROBAXIN) 500 MG tablet 1 tablet (500 mg) by Oral or Feeding Tube route 4 times daily as needed for muscle spasms  Qty: 30 tablet, Refills: 0    Associated Diagnoses: Liver transplant recipient (H)      metoprolol tartrate (LOPRESSOR) 25 MG tablet 0.5 tablets (12.5 mg) by Oral or Feeding Tube route every 8 hours  Qty: 60 tablet, Refills: 0    Associated Diagnoses: Liver transplant recipient (H)      nystatin (MYCOSTATIN) 497857 UNIT/ML suspension Take 5 mLs (500,000 Units) by mouth 4 times daily for 14 days  Qty: 280 mL, Refills: 0    Associated Diagnoses: Liver transplant recipient (H)      pantoprazole (PROTONIX) 40 MG EC tablet Take 1 tablet (40 mg) by mouth every morning (before breakfast)  Qty: 30 tablet, Refills: 0    Associated Diagnoses: Liver transplant recipient (H)      polyethylene glycol (MIRALAX) 17 GM/Dose powder Take 17 g by mouth daily  Qty: 510 g, Refills: 0    Associated Diagnoses: Liver transplant recipient (H)      sulfamethoxazole-trimethoprim (BACTRIM) 400-80 MG tablet Take 1 tablet by mouth daily  Qty: 30 tablet, Refills: 0    Associated Diagnoses: Liver transplant recipient (H)      tacrolimus (GENERIC EQUIVALENT) 1 MG capsule Take 3 capsules (3 mg) by mouth 2 times daily  Qty: 60 capsule, Refills: 0    Associated Diagnoses: Liver transplant recipient (H)      ursodiol (ACTIGALL) 250 MG tablet Take 1 tablet (250 mg) by mouth 2 times daily  Qty: 60 tablet, Refills: 0    Associated Diagnoses: Liver transplant recipient (H)      valGANciclovir (VALCYTE) 450 MG tablet Take 1 tablet (450 mg) by mouth daily  Qty: 30 tablet, Refills: 0    Associated Diagnoses: Liver transplant recipient (H)      warfarin ANTICOAGULANT (COUMADIN) 1 MG tablet Take 1 tablet (1 mg) by mouth daily  Qty: 30 tablet, Refills: 0    Associated Diagnoses: Liver transplant recipient (H)            STOP taking these medications       albuterol (PROAIR HFA/PROVENTIL HFA/VENTOLIN HFA) 108 (90 Base) MCG/ACT inhaler Comments:   Reason for Stopping:         bisacodyl (DULCOLAX) 10 MG suppository Comments:   Reason for Stopping:         multivitamin RENAL (RENAVITE RX/NEPHROVITE) 1 tablet tablet Comments:   Reason for Stopping:         ondansetron (ZOFRAN ODT) 4 MG ODT tab Comments:   Reason for Stopping:         oxyCODONE (ROXICODONE) 5 MG tablet Comments:   Reason for Stopping:         simethicone (MYLICON) 80 MG chewable tablet Comments:   Reason for Stopping:             Allergies   No Known Allergies

## 2023-07-26 ENCOUNTER — TELEPHONE (OUTPATIENT)
Dept: PHARMACY | Facility: CLINIC | Age: 68
End: 2023-07-26
Payer: COMMERCIAL

## 2023-07-26 ENCOUNTER — TELEPHONE (OUTPATIENT)
Dept: TRANSPLANT | Facility: CLINIC | Age: 68
End: 2023-07-26
Payer: COMMERCIAL

## 2023-07-26 ENCOUNTER — APPOINTMENT (OUTPATIENT)
Dept: OCCUPATIONAL THERAPY | Facility: SKILLED NURSING FACILITY | Age: 68
End: 2023-07-26
Attending: INTERNAL MEDICINE
Payer: COMMERCIAL

## 2023-07-26 ENCOUNTER — APPOINTMENT (OUTPATIENT)
Dept: PHYSICAL THERAPY | Facility: SKILLED NURSING FACILITY | Age: 68
End: 2023-07-26
Attending: INTERNAL MEDICINE
Payer: COMMERCIAL

## 2023-07-26 LAB
ALBUMIN SERPL BCG-MCNC: 3 G/DL (ref 3.5–5.2)
ALP SERPL-CCNC: 219 U/L (ref 40–129)
ALT SERPL W P-5'-P-CCNC: 45 U/L (ref 0–70)
ANION GAP SERPL CALCULATED.3IONS-SCNC: 14 MMOL/L (ref 7–15)
AST SERPL W P-5'-P-CCNC: 24 U/L (ref 0–45)
BILIRUB SERPL-MCNC: 0.6 MG/DL
BUN SERPL-MCNC: 23.1 MG/DL (ref 8–23)
CALCIUM SERPL-MCNC: 9 MG/DL (ref 8.8–10.2)
CHLORIDE SERPL-SCNC: 100 MMOL/L (ref 98–107)
CREAT SERPL-MCNC: 1.08 MG/DL (ref 0.67–1.17)
DEPRECATED HCO3 PLAS-SCNC: 24 MMOL/L (ref 22–29)
GFR SERPL CREATININE-BSD FRML MDRD: 76 ML/MIN/1.73M2
GLUCOSE BLDC GLUCOMTR-MCNC: 120 MG/DL (ref 70–99)
GLUCOSE BLDC GLUCOMTR-MCNC: 189 MG/DL (ref 70–99)
GLUCOSE BLDC GLUCOMTR-MCNC: 219 MG/DL (ref 70–99)
GLUCOSE BLDC GLUCOMTR-MCNC: 97 MG/DL (ref 70–99)
GLUCOSE SERPL-MCNC: 99 MG/DL (ref 70–99)
INR PPP: 2.23 (ref 0.85–1.15)
MAGNESIUM SERPL-MCNC: 1.9 MG/DL (ref 1.7–2.3)
PHOSPHATE SERPL-MCNC: 2.6 MG/DL (ref 2.5–4.5)
POTASSIUM SERPL-SCNC: 4.2 MMOL/L (ref 3.4–5.3)
PROT SERPL-MCNC: 5.6 G/DL (ref 6.4–8.3)
SODIUM SERPL-SCNC: 138 MMOL/L (ref 136–145)
TACROLIMUS BLD-MCNC: 7.4 UG/L (ref 5–15)
TME LAST DOSE: NORMAL H
TME LAST DOSE: NORMAL H

## 2023-07-26 PROCEDURE — 250N000013 HC RX MED GY IP 250 OP 250 PS 637: Performed by: INTERNAL MEDICINE

## 2023-07-26 PROCEDURE — 36415 COLL VENOUS BLD VENIPUNCTURE: CPT | Performed by: INTERNAL MEDICINE

## 2023-07-26 PROCEDURE — 84100 ASSAY OF PHOSPHORUS: CPT | Performed by: INTERNAL MEDICINE

## 2023-07-26 PROCEDURE — 97530 THERAPEUTIC ACTIVITIES: CPT | Mod: GP | Performed by: PHYSICAL THERAPIST

## 2023-07-26 PROCEDURE — 250N000012 HC RX MED GY IP 250 OP 636 PS 637: Performed by: NURSE PRACTITIONER

## 2023-07-26 PROCEDURE — 99316 NF DSCHRG MGMT 30 MIN+: CPT | Performed by: INTERNAL MEDICINE

## 2023-07-26 PROCEDURE — 97110 THERAPEUTIC EXERCISES: CPT | Mod: GP | Performed by: PHYSICAL THERAPIST

## 2023-07-26 PROCEDURE — 83735 ASSAY OF MAGNESIUM: CPT | Performed by: INTERNAL MEDICINE

## 2023-07-26 PROCEDURE — 120N000009 HC R&B SNF

## 2023-07-26 PROCEDURE — 80197 ASSAY OF TACROLIMUS: CPT | Performed by: INTERNAL MEDICINE

## 2023-07-26 PROCEDURE — 97535 SELF CARE MNGMENT TRAINING: CPT | Mod: GO

## 2023-07-26 PROCEDURE — 250N000012 HC RX MED GY IP 250 OP 636 PS 637: Performed by: INTERNAL MEDICINE

## 2023-07-26 PROCEDURE — 80053 COMPREHEN METABOLIC PANEL: CPT | Performed by: INTERNAL MEDICINE

## 2023-07-26 PROCEDURE — 85610 PROTHROMBIN TIME: CPT | Performed by: INTERNAL MEDICINE

## 2023-07-26 RX ADMIN — POTASSIUM CHLORIDE 40 MEQ: 750 TABLET, EXTENDED RELEASE ORAL at 20:43

## 2023-07-26 RX ADMIN — MAGNESIUM OXIDE TAB 400 MG (241.3 MG ELEMENTAL MG) 800 MG: 400 (241.3 MG) TAB at 20:43

## 2023-07-26 RX ADMIN — NYSTATIN 500000 UNITS: 500000 SUSPENSION ORAL at 08:16

## 2023-07-26 RX ADMIN — INSULIN ASPART 2 UNITS: 100 INJECTION, SOLUTION INTRAVENOUS; SUBCUTANEOUS at 12:18

## 2023-07-26 RX ADMIN — URSODIOL 250 MG: 250 TABLET ORAL at 08:14

## 2023-07-26 RX ADMIN — NYSTATIN 500000 UNITS: 500000 SUSPENSION ORAL at 16:06

## 2023-07-26 RX ADMIN — SIMETHICONE 80 MG: 80 TABLET, CHEWABLE ORAL at 08:40

## 2023-07-26 RX ADMIN — Medication 12.5 MG: at 22:10

## 2023-07-26 RX ADMIN — INSULIN GLARGINE 5 UNITS: 100 INJECTION, SOLUTION SUBCUTANEOUS at 22:16

## 2023-07-26 RX ADMIN — Medication 1 MG: at 18:02

## 2023-07-26 RX ADMIN — MYCOPHENOLIC ACID 540 MG: 180 TABLET, DELAYED RELEASE ORAL at 08:15

## 2023-07-26 RX ADMIN — INSULIN ASPART 4 UNITS: 100 INJECTION, SOLUTION INTRAVENOUS; SUBCUTANEOUS at 18:11

## 2023-07-26 RX ADMIN — SULFAMETHOXAZOLE AND TRIMETHOPRIM 1 TABLET: 400; 80 TABLET ORAL at 08:11

## 2023-07-26 RX ADMIN — POLYETHYLENE GLYCOL 3350 17 G: 17 POWDER, FOR SOLUTION ORAL at 08:16

## 2023-07-26 RX ADMIN — INSULIN ASPART 3 UNITS: 100 INJECTION, SOLUTION INTRAVENOUS; SUBCUTANEOUS at 18:11

## 2023-07-26 RX ADMIN — Medication 12.5 MG: at 06:03

## 2023-07-26 RX ADMIN — NYSTATIN 500000 UNITS: 500000 SUSPENSION ORAL at 20:43

## 2023-07-26 RX ADMIN — BUMETANIDE 1 MG: 1 TABLET ORAL at 08:13

## 2023-07-26 RX ADMIN — URSODIOL 250 MG: 250 TABLET ORAL at 20:43

## 2023-07-26 RX ADMIN — MAGNESIUM OXIDE TAB 400 MG (241.3 MG ELEMENTAL MG) 800 MG: 400 (241.3 MG) TAB at 12:13

## 2023-07-26 RX ADMIN — VALGANCICLOVIR HYDROCHLORIDE 450 MG: 450 TABLET ORAL at 20:44

## 2023-07-26 RX ADMIN — POTASSIUM CHLORIDE 40 MEQ: 750 TABLET, EXTENDED RELEASE ORAL at 08:12

## 2023-07-26 RX ADMIN — INSULIN ASPART 9 UNITS: 100 INJECTION, SOLUTION INTRAVENOUS; SUBCUTANEOUS at 08:16

## 2023-07-26 RX ADMIN — SIMETHICONE 80 MG: 80 TABLET, CHEWABLE ORAL at 22:10

## 2023-07-26 RX ADMIN — ASPIRIN 325 MG: 325 TABLET, FILM COATED ORAL at 08:10

## 2023-07-26 RX ADMIN — PANTOPRAZOLE SODIUM 40 MG: 40 TABLET, DELAYED RELEASE ORAL at 06:08

## 2023-07-26 RX ADMIN — PREDNISONE 10 MG: 5 TABLET ORAL at 08:11

## 2023-07-26 RX ADMIN — ATORVASTATIN CALCIUM 20 MG: 10 TABLET, FILM COATED ORAL at 20:43

## 2023-07-26 RX ADMIN — ALLOPURINOL 100 MG: 100 TABLET ORAL at 08:11

## 2023-07-26 RX ADMIN — INSULIN ASPART 2 UNITS: 100 INJECTION, SOLUTION INTRAVENOUS; SUBCUTANEOUS at 12:16

## 2023-07-26 RX ADMIN — BUMETANIDE 1 MG: 1 TABLET ORAL at 16:06

## 2023-07-26 RX ADMIN — NYSTATIN 500000 UNITS: 500000 SUSPENSION ORAL at 12:14

## 2023-07-26 RX ADMIN — MULTIPLE VITAMINS W/ MINERALS TAB 1 TABLET: TAB at 08:13

## 2023-07-26 RX ADMIN — TACROLIMUS 3 MG: 1 CAPSULE ORAL at 18:03

## 2023-07-26 RX ADMIN — MYCOPHENOLIC ACID 540 MG: 180 TABLET, DELAYED RELEASE ORAL at 18:02

## 2023-07-26 RX ADMIN — TACROLIMUS 3 MG: 1 CAPSULE ORAL at 08:14

## 2023-07-26 ASSESSMENT — ACTIVITIES OF DAILY LIVING (ADL)
ADLS_ACUITY_SCORE: 39
ADLS_ACUITY_SCORE: 37
ADLS_ACUITY_SCORE: 37
ADLS_ACUITY_SCORE: 39
ADLS_ACUITY_SCORE: 39
ADLS_ACUITY_SCORE: 37
ADLS_ACUITY_SCORE: 39
ADLS_ACUITY_SCORE: 39
ADLS_ACUITY_SCORE: 37
ADLS_ACUITY_SCORE: 39

## 2023-07-26 NOTE — TELEPHONE ENCOUNTER
A pharmacist spent 40 minutes providing medication teaching with Damion Quinones and wife, Apoorva, for discharge with a focus on new medications/dose changes.  The discharge medication list was reviewed with the patient/family and the following points were discussed, as applicable: description, purpose, dose/strength, duration of medications, special storage requirements, common side effects, food/medications to avoid, action to be taken if dose is missed, when to call MD, safe disposal of unused medications, and how to obtain refills.  The spouse (Apoorva) will be responsible for managing medications. Additionally, the following transplant related education was covered: Purpose of medication card, Medication videos, Timing of medications and day of lab draw considerations , Prescription Insurance , and Discharge process for receiving meds   Patient will  transplant supplies including 7 day pill organizer, thermometer, and BP monitor at the discharge pharmacy along with medications.  Patient chooses to receive medications from [facility].   Clinical Pharmacy Consult:                                                      Transplant Specific:   Date of Transplant: 06/06/2023  Type of Transplant: kidney and liver  First Transplant: yes  History of rejection: no    Immunosuppression Regimen   TAC 3 mg qAM & 3 mg qPM, Prednisone 10 mg daily, and Myforitic 540 mg qAM & 540 mg qPM  Patient specific goal: 5-6  Most recent level: 6.6, date 7/24/2023  Immunosuppressant Levels:  Supratherapeutic  Pt adherent to lab draws: yes  Scr:   Lab Results   Component Value Date    CR 1.08 07/26/2023    CR 1.11 08/27/2019     Side effects: Tremors    Prophylactic Medications  Antibacterial:  Bactrim 400-800 daily  Scheduled Discontinue Date: Lifelong    Antifungal: Nystatin  Scheduled Discontinue Date:  MD to evaluate    Antiviral: Max dose in Liver transplant is Valcyte 450mg once daily   Scheduled Discontinue Date: 3  months    Acid Reducer: Protonix (pantoprazole)  Scheduled Reviewed Date:  MD to evaluate    Thrombosis Prevention: Aspirin 325 mg PO daily  Scheduled Discontinue Date:  MD to evaluate    Blood Pressure Management  Frequency of home Blood Pressure checks:    Most recent home BP:  108/60  Patient Blood pressure goal: <140/90  Patient blood pressure at goal:  yes  Hospitalizations/ER visits since last assessment:        Med rec/DUR performed: yes  Med Rec Discrepancies: no    Reminders:    Bring to first clinic appt: med box, med card, bp monitor, all medications being taken, and lab book.  2.   MTM pharmacist visit on first clinic appt and if ok, again in 3 to 4 months during follow up appt.  3.   Avoid Grapefruit and Grapefruit juice.   4.   Avoid herbal supplements. If wish to take other medications or supplements, call your coordinator.   5.   Keep lab appts.   6.   Can use apps on phone like [employer] to help manage medication lists and reminders.   7.   Make sure you are protecting your skin by wearing long sleeves and applying sunscreen to exposed skin, for any significant time in the sun.     Transplant Coordinator is Mirella Holcomb (kidney), Charito Nixon (liver)    Asif Cui, PharmD, BCACP  [facility]  [address] [place] [id number]  Specialty - [phone]  Transplant - [phone]  Mail - [phone]

## 2023-07-26 NOTE — TELEPHONE ENCOUNTER
Call to Apoorva and Casey to review discharge teaching related to liver transplant.   Casey will be discharging to home tomorrow from TCU, both Casey and Apoorva are very excited to be going home.   Reviewed lab frequency, 2x/week for first 4 weeks, then weekly x4, then spacing out labs per guideline. Also reviewed reasoning for close lab monitoring, only way to see how new liver is functioning. Apoorva asked if labs can be done in [place] at [facility], informed her that labs are entered and can be done at any [facility] lab.   Reviewed need for follow up with PCP, which he already has scheduled. Reviewed that transplant coordinator and team will deal with transplant medications, but PCP will be responsible for all other medications and diagnoses.   Reviewed follow up appointments, will see Dr. Clifton 8/7, probably one more time before hepatology appt with Dr. Bermudez on 9/5. Briefly reviewed tacrolimus timing, every 12 hours and to make sure that evening dose is taken 12 hours before labs scheduled on lab days. Advised to not take tacrolimus dose until after labs drawn on lab days.   Reviewed phone number for transplant office and that there is a nurse available at all times for questions and concerns.   Reviewed med card and using pencil due to sometimes frequent dose changes.   Casey and Apoorva understood teaching and will call with any questions in the future.

## 2023-07-26 NOTE — PLAN OF CARE
Goal Outcome Evaluation:       A & O X 4. Denies SOB, CP, & N&V. A 1 with walker and GB. Uses BSC. Takes med. Whole with thin liquid. Ate half of dinner. PIV line flush on right lower arm and patent.  Spouse was present this afternoon and took pt. On a walk. He spend most of the evening in the recliner. Will continue to monitor P.O.C                Patient's most recent vital signs are:     Vital signs:  BP: 116/73  Temp: 98.7  HR: 102  RR: 20  SpO2: 95 %     Patient does not have new respiratory symptoms.  Patient does not have new sore throat.  Patient does not have a fever greater than 99.5.

## 2023-07-26 NOTE — CONSULTS
Discussed diabetes education with PLC. Completed 7/25/23 with spouse. See Darlene Hussein RN note 7/25/23 4:15 pm.

## 2023-07-26 NOTE — PLAN OF CARE
Physical Therapy Discharge Summary    Reason for therapy discharge:    Discharged to home with outpatient therapy.    Progress towards therapy goal(s). See goals on Care Plan in [facility] electronic health record for goal details.  Patient met all goals except for ascending/descending 8 steps. Pt has made progress towards this goal but still needs improved strengthening to achieve it.    Therapy recommendation(s):    Continued therapy is recommended.  Rationale/Recommendations:  Patient will benefit from physical therapy to address stability with functional mobility, to improve strength in order to perform stairs safely and to improve cardiovascular endurance..

## 2023-07-26 NOTE — PLAN OF CARE
Goal Outcome Evaluation:  FOCUS/GOAL  Bowel management, Bladder management, Medication management, Pain management, Medical management, Mobility, Skin integrity, and Equipment/Assistive devices    ASSESSMENT, INTERVENTIONS AND CONTINUING PLAN FOR GOAL:  Overall Pt had no acute issue this shift. Alert and oriented x 4. Able to make needs known to staffs. Pt is mod I in room. Denied having chest pain, SOB, N/V, fever and chills. Appeared sleeping during safety rounds. Pt is on RN managed electrolytes. Labs to be drawn this morning. Appears comfortable at the time of this report. Will continue with POC.       Patient's most recent vital signs are:     Vital signs:  BP: 116/73  Temp: 98.7  HR: 102  RR: 20  SpO2: 95 %     Patient does not have new respiratory symptoms.  Patient does not have new sore throat.  Patient does not have a fever greater than 99.5.

## 2023-07-26 NOTE — CARE PLAN
RN: Discharging to home with wife tomorrow,  Denies need for pain meds. Had shower with OT and pt wife changed mepilex dressings on right abd and left heel. Mod I in room.  No electrolyte replacement needed toda    Patient's most recent vital signs are:     Vital signs:  BP: 108/60  Temp: 97.9  HR: 92  RR: 18  SpO2: 97 %     Patient does not have new respiratory symptoms.  Patient does not have new sore throat.  Patient does not have a fever greater than 99.5.     y.

## 2023-07-26 NOTE — PROGRESS NOTES
Discharge Plan     Discharge Date: 7/27/23  Discharge Disposition:  Home .     Discharge Services:  Out PT PT/OT    Discharge Supplies: All DME supplied by PT/OT    Discharge Transportation:  Wife    Alexandra Denney, JOSELIN   [facility], Transitional Care Unit   Social Work   [facility] [address] [place] [address]  () [phone]

## 2023-07-26 NOTE — PLAN OF CARE
Goal Outcome Evaluation:    Patient is Alert and oriented x4, able to make needs known. Assist of 1 with walker and gait belt. Patient uses bed side commode and continent for bowel and bladder. Takes med whole with thin liquids. I V site dry and patent, flushed with NaCl. All scheduled meds administered. No acute issue on this shift. Nursing will continue poc. Expected discharge date: 7/27.     Overall Patient Progress: no change      Patient's most recent vital signs are:     Vital signs:  BP: 116/73  Temp: 98.7  HR: 102  RR: 20  SpO2: 95 %     Patient does not have new respiratory symptoms.  Patient does not have new sore throat.  Patient does not have a fever greater than 99.5.

## 2023-07-26 NOTE — PLAN OF CARE
Occupational Therapy Discharge Summary    Reason for therapy discharge:    All goals and outcomes met, no further needs identified.    Progress towards therapy goal(s). See goals on Care Plan in [facility] electronic health record for goal details.  Goals met    Therapy recommendation(s):    No further therapy is recommended. At this time, pt modified indep w/ basic adls except feet level and declined AE for donning footwear. Pt will be discharge to home w/ wife to assist PRN. Pt plans to receive outpt PT but at this time no further OT recommended. Wife /pt purchased recommended AE  from outside source. Pt modified indep w/ HEP for marcos ue strengthening.

## 2023-07-27 ENCOUNTER — TELEPHONE (OUTPATIENT)
Dept: PHARMACY | Facility: CLINIC | Age: 68
End: 2023-07-27
Payer: COMMERCIAL

## 2023-07-27 ENCOUNTER — TELEPHONE (OUTPATIENT)
Dept: TRANSPLANT | Facility: CLINIC | Age: 68
End: 2023-07-27
Payer: COMMERCIAL

## 2023-07-27 ENCOUNTER — TELEPHONE (OUTPATIENT)
Dept: ANTICOAGULATION | Facility: CLINIC | Age: 68
End: 2023-07-27
Payer: COMMERCIAL

## 2023-07-27 VITALS
DIASTOLIC BLOOD PRESSURE: 68 MMHG | OXYGEN SATURATION: 100 % | WEIGHT: 219.9 LBS | BODY MASS INDEX: 34.44 KG/M2 | RESPIRATION RATE: 18 BRPM | HEART RATE: 100 BPM | TEMPERATURE: 98.3 F | SYSTOLIC BLOOD PRESSURE: 107 MMHG

## 2023-07-27 DIAGNOSIS — I48.20 CHRONIC ATRIAL FIBRILLATION (H): Primary | ICD-10-CM

## 2023-07-27 DIAGNOSIS — Z94.4 LIVER TRANSPLANT RECIPIENT (H): ICD-10-CM

## 2023-07-27 LAB
GLUCOSE BLDC GLUCOMTR-MCNC: 117 MG/DL (ref 70–99)
GLUCOSE BLDC GLUCOMTR-MCNC: 90 MG/DL (ref 70–99)
INR PPP: 2.12 (ref 0.85–1.15)
MAGNESIUM SERPL-MCNC: 1.6 MG/DL (ref 1.7–2.3)
PHOSPHATE SERPL-MCNC: 2.6 MG/DL (ref 2.5–4.5)
POTASSIUM SERPL-SCNC: 4 MMOL/L (ref 3.4–5.3)

## 2023-07-27 PROCEDURE — 84132 ASSAY OF SERUM POTASSIUM: CPT | Performed by: INTERNAL MEDICINE

## 2023-07-27 PROCEDURE — 250N000012 HC RX MED GY IP 250 OP 636 PS 637: Performed by: NURSE PRACTITIONER

## 2023-07-27 PROCEDURE — 84100 ASSAY OF PHOSPHORUS: CPT | Performed by: INTERNAL MEDICINE

## 2023-07-27 PROCEDURE — 250N000013 HC RX MED GY IP 250 OP 250 PS 637: Performed by: INTERNAL MEDICINE

## 2023-07-27 PROCEDURE — 85610 PROTHROMBIN TIME: CPT | Performed by: INTERNAL MEDICINE

## 2023-07-27 PROCEDURE — 250N000012 HC RX MED GY IP 250 OP 636 PS 637: Performed by: INTERNAL MEDICINE

## 2023-07-27 PROCEDURE — 83735 ASSAY OF MAGNESIUM: CPT | Performed by: INTERNAL MEDICINE

## 2023-07-27 PROCEDURE — 36415 COLL VENOUS BLD VENIPUNCTURE: CPT | Performed by: INTERNAL MEDICINE

## 2023-07-27 RX ORDER — WARFARIN SODIUM 1 MG/1
1 TABLET ORAL
Status: DISCONTINUED | OUTPATIENT
Start: 2023-07-27 | End: 2023-07-27 | Stop reason: HOSPADM

## 2023-07-27 RX ADMIN — ALLOPURINOL 100 MG: 100 TABLET ORAL at 07:56

## 2023-07-27 RX ADMIN — MULTIPLE VITAMINS W/ MINERALS TAB 1 TABLET: TAB at 07:58

## 2023-07-27 RX ADMIN — SULFAMETHOXAZOLE AND TRIMETHOPRIM 1 TABLET: 400; 80 TABLET ORAL at 07:58

## 2023-07-27 RX ADMIN — ACETAMINOPHEN 650 MG: 325 TABLET, FILM COATED ORAL at 07:54

## 2023-07-27 RX ADMIN — ASPIRIN 325 MG: 325 TABLET, FILM COATED ORAL at 07:57

## 2023-07-27 RX ADMIN — MYCOPHENOLIC ACID 540 MG: 180 TABLET, DELAYED RELEASE ORAL at 07:58

## 2023-07-27 RX ADMIN — POTASSIUM CHLORIDE 40 MEQ: 750 TABLET, EXTENDED RELEASE ORAL at 07:54

## 2023-07-27 RX ADMIN — URSODIOL 250 MG: 250 TABLET ORAL at 07:55

## 2023-07-27 RX ADMIN — PREDNISONE 10 MG: 5 TABLET ORAL at 07:55

## 2023-07-27 RX ADMIN — POLYETHYLENE GLYCOL 3350 17 G: 17 POWDER, FOR SOLUTION ORAL at 07:55

## 2023-07-27 RX ADMIN — PANTOPRAZOLE SODIUM 40 MG: 40 TABLET, DELAYED RELEASE ORAL at 06:34

## 2023-07-27 RX ADMIN — BUMETANIDE 1 MG: 1 TABLET ORAL at 07:57

## 2023-07-27 RX ADMIN — NYSTATIN 500000 UNITS: 500000 SUSPENSION ORAL at 07:55

## 2023-07-27 RX ADMIN — Medication 12.5 MG: at 06:34

## 2023-07-27 RX ADMIN — TACROLIMUS 3 MG: 1 CAPSULE ORAL at 07:57

## 2023-07-27 RX ADMIN — INSULIN ASPART 8 UNITS: 100 INJECTION, SOLUTION INTRAVENOUS; SUBCUTANEOUS at 09:55

## 2023-07-27 ASSESSMENT — ACTIVITIES OF DAILY LIVING (ADL)
ADLS_ACUITY_SCORE: 37

## 2023-07-27 NOTE — PLAN OF CARE
Care Coordination:     Patient discharged today. Patient received transplant bag w/ bp cuff, pill box, and thermometer. Medication Card up to date. Writer gave hand off to Transplant coordinators. Patient has received education for DM management, and transplant pharmacy education. Will continue with OP pt/ot- therapies were to arrange OP referral.     Lizbeth Vasquez   Patient Care Management Coordinator  Acute Rehabilitation Unit/ Transitional Care Unit.   Ph: [phone]

## 2023-07-27 NOTE — PLAN OF CARE
FOCUS/GOAL       ASSESSMENT, INTERVENTIONS AND CONTINUING PLAN FOR GOAL:     Orientation: A&O x4, makes needs known   Pain: Pt denies pain, cp, sob, n/v, n/t  Transfers: MOD I with walker  Bowel:  Continent last BM 7/24/23  Bladder: Continent  Diet/ Liquids: Regular diet  Other: Possibly discharge on 7/27/23, pt is sleeping on the chair, reclined.   call light within reach. Continue with POC.

## 2023-07-27 NOTE — TELEPHONE ENCOUNTER
Casey Quinones is a post-liver/kidney transplant patient who discharged on 7/26/23. Patient chooses to receive medications from [facility]. The spouse (Apoorva) will be responsible for managing medications.    PRIMARY HEALTH BENEFIT: COMMERCIAL  ID# [id number] GRP# [id number] (EFFECTIVE (1/1/2020) )  SECONDARY MEDICARE HEALTH BENEFIT: (MEDICARE)  ID# [id number] (PART A EFFECTIVE (9/1/2022) , PART B EFFECTIVE (10/01/2025) )   PROCESSING INFO: (MDCR SECONDARY) ID# [id number] GRP#OTHER BIN#[id number]  PT WILL PAY $0 AT TIME OF SERVICE      PATIENT DOES NOT HAVE PART D COVERAGE BUT DOES HAVE PHAMACY BENEFITS THROUGH [employer]     PHARMACY BENEFIT: (ARGUS)  PROCESSING INFO: ID# [id number] PCN# [id number] BIN#[id number] (EFFECTIVE (1/1/2020) ).   DEDUCTIBLE (NONE) & MAX OUT-OF-POCKET (1750.00) - MET  COPAY STRUCTURE:  TIER 1: $15  TIER 2: $15  TIER 3: $50  TIER 4: $75  (PA NEEDED) FOR NON-FORMULARY MEDICATIONS     TEST CLAIM SPECIALTY #28   MYCOPHENOLATE 250MG (#240/30DS) - $0 AT TIME OF SERVICE  PROGRAF 1MG (#180/30DS) - $0 AT TIME OF SERVICE  TACROLIMUS 1MG (#60/30DS) - $0 AT TIME OF SERVICE  CYCLOSPORINE 100MG (#60/30DS) - $0 AT TIME OF SERVICE  VALGANCICLOVIR 450MG (#60/30DS)- $0      TEST CLAIM DISCHARGE #27  MYCOPHENOLATE 250MG (#240/30DS)- - $0 AT TIME OF SERVICE  PROGRAF 1MG (#180/30DS) - $0 AT TIME OF SERVICE  TACROLIMUS 1MG (#60/30DS)-- - $0 AT TIME OF SERVICE  CYCLOSPORINE 100MG (#60/30DS) - $0 AT TIME OF SERVICE  VALGANCICLOVIR 450MG (#60/30DS)- $0    **CAN PATIENT FILL AT [facility] FOR MEDICATIONS LISTED? YES    Deena Fields, [facility]

## 2023-07-27 NOTE — TELEPHONE ENCOUNTER
ANTICOAGULATION  MANAGEMENT: Discharge Review    Damion Quinones chart reviewed for anticoagulation continuity of care    Hospital Admission on  to  then TCU  to , liver and kidney transplant on     Discharge disposition: Home    Results:    Recent labs: (last 7 days)     23  0706 23  0654 23  0631 23  0801 23  0634 23  0556 23  0552   INR 1.92* 2.03* 2.09* 2.28* 2.30* 2.23* 2.12*     Anticoagulation inpatient management:     less warfarin administered than maintenance regimen    Anticoagulation discharge instructions:     Warfarin dosinmg daily   Bridging: No   INR goal change: Yes: changed to 1.5 to 2.0      Medication changes affecting anticoagulation: Yes: started multivitamin, Myfortic, potassium chloride, prednisone    Additional factors affecting anticoagulation: Yes: organ transplant, deconditioned      PLAN     Recommend to check INR on     Spoke with wife    Anticoagulation Calendar updated    Adeline Jo RN

## 2023-07-27 NOTE — TELEPHONE ENCOUNTER
Apoorva and Casey called to review a couple things after discharge today. Apoorva had questions about carb counting and insulin timing. We reviewed order of 1 unit short acting insulin for every 12 gm of carbs and that the insulin should be given after he eats. Reviewed how to figure out how many grams of carbs Casey is eating at a meal. Reminded her to contact specialty pharmacist with med questions.   Apoorva has scheduled Casey's labs for the next few weeks and questioned whether the lab would know all of the labs he needed drawn. Reviewed that lab orders were entered so that all [facility] labs can see them and to remind the lab staff that he needs all of his post transplant labs drawn each time he is scheduled.   Apoorva is a little overwhelmed with everything, let her know she can call with questions any time.

## 2023-07-27 NOTE — PLAN OF CARE
Discharge Notes:   A/O x 4, afebrile, denied SOB, with light headache per patient, Tylenol PRN given per request, effective. All AM meds given including sliding scale insulin and carb coverage. Wife came in AVS copy reviewed with both patient and wife, appointments, med list, discharge instructions provided, verbalized understanding of all information provided. Medications both in  bag and from the fridge, transplant bag all given to wife. Rt. Abdominal dressing intact per wife and doesn't need to be change not until tomorrow. Rt. Arm PIV removed, patient not on any IV meds. Discharged to home with wife at 1035H.    Patient's most recent vital signs are:     Vital signs:  BP: 107/68  Temp: 98.3  HR: 100  RR: 18  SpO2: 100 %     Patient does not have new respiratory symptoms.  Patient does not have new sore throat.  Patient does not have a fever greater than 99.5.

## 2023-07-27 NOTE — PLAN OF CARE
Goal Outcome Evaluation:    A &O X 4, Denies SOB, CP, N&V, fever and headache. Continent to B&B. Appeared sleepy most of the evening. Mod I with walker. Eats regular diet. Ate 100% of dinner. Novolog insulin dose was not given this evening because parameters were not met (). Pt is on RN managed, so electrolytes labs (Mag., Phosphorus, Potassium) drawn for tomorrow morning. Pt requested one (1) Simethicone this evening. Able to make needs known. Will continue to monitor POC.           Patient's most recent vital signs are:     Vital signs:  BP: 119/64  Temp: 98.3  HR: 91  RR: 18  SpO2: 96 %     Patient does not have new respiratory symptoms.  Patient does not have new sore throat.  Patient does not have a fever greater than 99.5.

## 2023-07-27 NOTE — TELEPHONE ENCOUNTER
Received new INR referral with updated goal range of 1.5-2.0.  Called and spoke to patient's wife and patient wasn't discharged from TCU yesterday but should be going home today.  Scheduled lab appointment in [place] for Monday.  Patient will need transplant labs 2x weekly for awhile.  They will make sure to get Warfarin dosing instructions at discharge.  INR standing order placed since there wasn't one in the orders tab.  Manuel PADGETT

## 2023-07-28 ENCOUNTER — THERAPY VISIT (OUTPATIENT)
Dept: PHYSICAL THERAPY | Facility: REHABILITATION | Age: 68
End: 2023-07-28
Payer: COMMERCIAL

## 2023-07-28 DIAGNOSIS — Z94.0 KIDNEY TRANSPLANT RECIPIENT: ICD-10-CM

## 2023-07-28 DIAGNOSIS — Z94.4 LIVER TRANSPLANT RECIPIENT (H): ICD-10-CM

## 2023-07-28 PROCEDURE — 97162 PT EVAL MOD COMPLEX 30 MIN: CPT | Mod: GP

## 2023-07-28 PROCEDURE — 97110 THERAPEUTIC EXERCISES: CPT | Mod: GP

## 2023-07-28 NOTE — PROGRESS NOTES
"PHYSICAL THERAPY EVALUATION  Type of Visit: Evaluation    See electronic medical record for Abuse and Falls Screening details.    Subjective       Casey returns to PT (with wife, Nabila) after liver and kidney transplant on 6/6/23 with subsequent ICU and rehab stays. He was discharged yesterday and presents to PT today to work on his gait, balance, mobility, and endurance. He is currently at home where there are 8 stairs up and 8 stairs down (7.5 inches each), but he has a bed and bathroom on the main level, so he doesn't need to be able to any stairs right now. He is currently using a 4WW for gait and he reports feeling safe/stable, but he gets winded really quickly. He also reports recent episode of gout in both feet and right knee which limited his PT participation while in the transitional care unit, but it is slowly improving. He reports that his walking is currently limited by both his endurance deficit and gout-related pain. They report gradual improvements and increased strength and endurance since the transplant. He denies pain other than his current gout episode. 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 Patient also had end-stage renal disease secondary to IgA nephropathy and ANCA vasculitis.      Patient had been hospitalized from 05-Jun-2023 to 25-Jun-2023 for end-stage liver disease and end-stage renal disease. On 06-Jun-2023, patient underwent simultaneous liver and kidney transplant. On 06-Jun-2023, patient returned to the OR with concern for low flow in the renal graft - patient underwent ex-lap, washout, closure with healthy appearing graft with intact arterial and venous flow. Hospital course of was also compliucated by acute kidney injury requiring CRRT and intermittent " "hemodialysis, right internal jugular clot, atrial fibrillation, hypertension, fluid overload, hypoxemia, anemia, leucocytosis, malnutrition, gout, deconditioning and hyperglycemia secondary to steroids and/or tube feeds. Patient was discharged to TCU on 25-Jun-2023.     On 15-Chris-2023, patient underwent ultrasound of bilateral upper extremities that showed persistent nonocclusive thrombus in the lower right internal jugular vein similar to comparison ultrasound on 03-Mar-2023 and resolved right axillary thrombus.      During hospital stay, patient had been positive for hepatitis B surface antigen on 05-Jun-2023 but was not previously positive on 26-Jan-2023. Hepatitis B virus DNA was negative on 05-Jun-2023 and patient had no clear at risk behaviour. Repeat hepatitis B surface antigen and HBV was not detected. The positive hepatitis B surface antigen on 05-Jun-2023 is suspected to be a false positive.\"    Presenting condition or subjective complaint: Recovery from liver and kidney transplant  Date of onset: 06/06/23 (Date of surgery)    Relevant medical history: Anemia; Arthritis; Cold or hot arm or leg; Diabetes; DVT (blood clot); Implanted device; Kidney disease; Rheumatoid arthritis   Dates & types of surgery: Knee replacement [id number]. Transplant 2023.    Prior diagnostic imaging/testing results:       Prior therapy history for the same diagnosis, illness or injury: Yes Pre-transplant for strength    Prior Level of Function  Transfers: Assistive equipment  Ambulation: Assistive equipment  ADL: Independent, Assistive equipment and person, depends on task  IADL:     Living Environment  Social support: With a significant other or spouse   Type of home: House; Multi-level   Stairs to enter the home: Yes 3 Is there a railing: Yes   Ramp: Yes   Stairs inside the home: Yes 8 (to each level) Is there a railing: Yes   Help at home: None  Equipment owned: Walker with wheels; Standard wheelchair; Commode; Grab bars; Bath " bench     Employment: No    Hobbies/Interests: Fishing, hunting    Patient goals for therapy: Walk independently. Be totally independent    Pain assessment:  see subjective report     Objective   Cognitive Status Examination  Orientation: Oriented to person, place and time   Level of Consciousness: Alert  Follows Commands and Answers Questions: 100% of the time, Follows multi step instructions  Personal Safety and Judgement: Intact, Impulsive  Memory: Intact    OBSERVATION:   INTEGUMENTARY: Impaired, notable swelling and redness at right knee and right wrist and fifth finger from gout  POSTURE:   PALPATION:   RANGE OF MOTION:   STRENGTH: Hip, knee, shoulder, and elbow strength all min-mod limited    BED MOBILITY:     TRANSFERS: SBA, Verbal Cues, modified independent    WHEELCHAIR MOBILITY: independent    GAIT:   Level of [place]:  modified independent  Assistive Device(s): Walker (standard)  Gait Deviations: Antalgic  Base of support increased  Gait Distance: 108.7 feet  Stairs: not tested    BALANCE: Sitting Balance (static):Good  Sit to Stand Balance:Fair  Standing Balance (static):Fair    SPECIAL TESTS  Functional Gait Assessment (FGA)      10 Meter Walk Test (Comfortable)     10 Meter Walk Test (Fast)     6 Minute Walk Test (6MWT)           Zazueta Balance Scale (BBS)     5 Times Sit-to-Stand (5TSTS)       Dynamic Gait Index (DGI)     Timed Up and Go (TUG) - sec 17.44 seconds   Single Leg Stance Right (sec)    Single Leg Stance Left (sec)    Modified CTSIB Conditions (sec) Cond 1:   Cond 2:   Cond 4:   Cond 5 :    Romberg  (sec)    Sharpened Romberg (sec)    30 Second Sit to Stand (reps/height) 9 reps with bilateral upper extremity assist (notably winded afterward)           SENSATION:     REFLEXES:   COORDINATION:   MUSCLE TONE:         Assessment & Plan   CLINICAL IMPRESSIONS  Medical Diagnosis: Kidney transplant recipient  Liver transplant recipient (H)    Treatment Diagnosis: Impaired functional mobility,  gait, balance, and endurance   Impression/Assessment:     Clinical Decision Making (Complexity):  Clinical Presentation: Stable/Uncomplicated  Clinical Presentation Rationale: based on medical and personal factors listed in PT evaluation  Clinical Decision Making (Complexity): Moderate complexity    PLAN OF CARE  Treatment Interventions:  Interventions: Gait Training, Manual Therapy, Neuromuscular Re-education, Therapeutic Activity, Therapeutic Exercise, Self-Care/Home Management, Wheelchair Management/Training    Long Term Goals     PT Goal 1  Goal Identifier: LEFS - Short Term  Goal Description: Casey will demonstrate significantly improved function as evidenced by an improved LEFS score of at least 17/80.  Goal Progress: 8/80  Target Date: 08/25/23  PT Goal 2  Goal Identifier: LEFS - Long Term  Goal Description: Casey will demonstrate significantly improved function as evidenced by an improved LEFS score of at least 50/80.  Goal Progress: 8/80  Target Date: 10/06/23  PT Goal 3  Goal Identifier: Walking  Goal Description: Casey will demonstrate improved tolerance to and safety and independence with functional mobility as evidenced by his ability to walk for up to 20 minutes without need for assistive device.  Goal Progress: 108.7 feet (approximately 60-90 seconds) with standard walker  Target Date: 10/06/23  PT Goal 4  Goal Identifier: TUG  Goal Description: Casey will demonstrate improved functional mobility and a decreased fall risk as evidenced by an improved (decreased) TUG time of less than 13 seconds.  Goal Progress: 17.44 seconds with standard walker  Target Date: 10/06/23  PT Goal 5  Goal Identifier: 30 Second Sit-to-stand  Goal Description: Casey will demonstrate improved lower extremity strength and increased tolerance to functional transfers as evidenced by an improved 30 second sit-to-stand score of at least 10 reps without upper extremity assist.  Goal Progress: 9 reps with bilateral upper extremity  assist  Target Date: 10/06/23  PT Goal 6  Goal Identifier: 6 Minute Walk Test  Goal Description: Will assess next visit  Goal Progress: Will assess next visit  Target Date: 10/06/23  PT Goal 7  Goal Identifier: FGA  Goal Description: Will assess next visit  Goal Progress: Will assess next visit  Target Date: 10/06/23      Frequency of Treatment: 1-2 times/week  Duration of Treatment: 10 weeks    Recommended Referrals to Other Professionals:  none  Education Assessment:   Learner/Method: Patient;Significant Other;Listening;Demonstration;Pictures/Video;No Barriers to Learning    Risks and benefits of evaluation/treatment have been explained.   Patient/Family/caregiver agrees with Plan of Care.     Evaluation Time:     PT Eval, Moderate Complexity Minutes (14484): 28       Signing Clinician: Syd Grove PT      [facility]                                                                                   OUTPATIENT PHYSICAL THERAPY      PLAN OF TREATMENT FOR OUTPATIENT REHABILITATION   Patient's Last Name, First Name, Damion Saenz YOB: 1955   Provider's Name   [facility]   Medical Record No.  [id number]     Onset Date: 06/06/23 (Date of surgery)  Start of Care Date: 07/28/23     Medical Diagnosis:  Kidney transplant recipient  Liver transplant recipient (H)      PT Treatment Diagnosis:  Impaired functional mobility, gait, balance, and endurance Plan of Treatment  Frequency/Duration: 1-2 times/week/ 10 weeks    Certification date from 07/28/23 to 10/06/23         See note for plan of treatment details and functional goals     Syd Grove, PT                         I CERTIFY THE NEED FOR THESE SERVICES FURNISHED UNDER        THIS PLAN OF TREATMENT AND WHILE UNDER MY CARE     (Physician attestation of this document indicates review and certification of the therapy plan).                Referring Provider:  Lashay  Wilson      Initial Assessment  See Epic Evaluation- Start of Care Date: 07/28/23

## 2023-07-31 ENCOUNTER — TELEPHONE (OUTPATIENT)
Dept: TRANSPLANT | Facility: CLINIC | Age: 68
End: 2023-07-31

## 2023-07-31 ENCOUNTER — LAB (OUTPATIENT)
Dept: LAB | Facility: CLINIC | Age: 68
End: 2023-07-31
Payer: MEDICARE

## 2023-07-31 ENCOUNTER — ANTICOAGULATION THERAPY VISIT (OUTPATIENT)
Dept: ANTICOAGULATION | Facility: CLINIC | Age: 68
End: 2023-07-31

## 2023-07-31 DIAGNOSIS — B99.8 OTHER INFECTIOUS DISEASE: ICD-10-CM

## 2023-07-31 DIAGNOSIS — I48.20 CHRONIC ATRIAL FIBRILLATION (H): ICD-10-CM

## 2023-07-31 DIAGNOSIS — Z79.899 ENCOUNTER FOR LONG-TERM CURRENT USE OF MEDICATION: ICD-10-CM

## 2023-07-31 DIAGNOSIS — I48.20 CHRONIC ATRIAL FIBRILLATION (H): Primary | ICD-10-CM

## 2023-07-31 DIAGNOSIS — K70.31 ALCOHOLIC CIRRHOSIS OF LIVER WITH ASCITES (H): ICD-10-CM

## 2023-07-31 DIAGNOSIS — N18.30 CHRONIC KIDNEY DISEASE, STAGE 3 UNSPECIFIED (H): ICD-10-CM

## 2023-07-31 DIAGNOSIS — Z94.0 KIDNEY REPLACED BY TRANSPLANT: ICD-10-CM

## 2023-07-31 DIAGNOSIS — Z48.298 AFTERCARE FOLLOWING ORGAN TRANSPLANT: ICD-10-CM

## 2023-07-31 DIAGNOSIS — N18.5 CKD (CHRONIC KIDNEY DISEASE) STAGE 5, GFR LESS THAN 15 ML/MIN (H): Primary | ICD-10-CM

## 2023-07-31 DIAGNOSIS — Z94.4 LIVER REPLACED BY TRANSPLANT (H): Primary | ICD-10-CM

## 2023-07-31 DIAGNOSIS — Z94.4 LIVER TRANSPLANT RECIPIENT (H): ICD-10-CM

## 2023-07-31 DIAGNOSIS — Z94.0 KIDNEY REPLACED BY TRANSPLANT: Primary | ICD-10-CM

## 2023-07-31 DIAGNOSIS — Z94.4 LIVER REPLACED BY TRANSPLANT (H): ICD-10-CM

## 2023-07-31 DIAGNOSIS — F10.99 ALCOHOL USE, UNSPECIFIED WITH UNSPECIFIED ALCOHOL-INDUCED DISORDER (H): ICD-10-CM

## 2023-07-31 LAB
ALBUMIN SERPL BCG-MCNC: 3.5 G/DL (ref 3.5–5.2)
ALP SERPL-CCNC: 260 U/L (ref 40–129)
ALT SERPL W P-5'-P-CCNC: 79 U/L (ref 0–70)
ANION GAP SERPL CALCULATED.3IONS-SCNC: 15 MMOL/L (ref 7–15)
AST SERPL W P-5'-P-CCNC: 46 U/L (ref 0–45)
BILIRUB DIRECT SERPL-MCNC: <0.2 MG/DL (ref 0–0.3)
BILIRUB SERPL-MCNC: 0.4 MG/DL
BUN SERPL-MCNC: 23.5 MG/DL (ref 8–23)
CALCIUM SERPL-MCNC: 9.4 MG/DL (ref 8.8–10.2)
CHLORIDE SERPL-SCNC: 98 MMOL/L (ref 98–107)
CREAT SERPL-MCNC: 1.24 MG/DL (ref 0.67–1.17)
CREAT UR-MCNC: 81.9 MG/DL
DEPRECATED HCO3 PLAS-SCNC: 23 MMOL/L (ref 22–29)
ERYTHROCYTE [DISTWIDTH] IN BLOOD BY AUTOMATED COUNT: 17.4 % (ref 10–15)
GFR SERPL CREATININE-BSD FRML MDRD: 65 ML/MIN/1.73M2
GGT SERPL-CCNC: 318 U/L (ref 8–61)
GLUCOSE SERPL-MCNC: 102 MG/DL (ref 70–99)
HCT VFR BLD AUTO: 26.4 % (ref 40–53)
HGB BLD-MCNC: 7.8 G/DL (ref 13.3–17.7)
INR PPP: 1.59 (ref 0.85–1.15)
MAGNESIUM SERPL-MCNC: 1.5 MG/DL (ref 1.7–2.3)
MCH RBC QN AUTO: 28.4 PG (ref 26.5–33)
MCHC RBC AUTO-ENTMCNC: 29.5 G/DL (ref 31.5–36.5)
MCV RBC AUTO: 96 FL (ref 78–100)
MICROALBUMIN UR-MCNC: 20.1 MG/L
MICROALBUMIN/CREAT UR: 24.54 MG/G CR (ref 0–17)
PHOSPHATE SERPL-MCNC: 2.5 MG/DL (ref 2.5–4.5)
PLATELET # BLD AUTO: 315 10E3/UL (ref 150–450)
POTASSIUM SERPL-SCNC: 3.9 MMOL/L (ref 3.4–5.3)
PROT SERPL-MCNC: 6.3 G/DL (ref 6.4–8.3)
RBC # BLD AUTO: 2.75 10E6/UL (ref 4.4–5.9)
SODIUM SERPL-SCNC: 136 MMOL/L (ref 136–145)
TACROLIMUS BLD-MCNC: 6.1 UG/L (ref 5–15)
TME LAST DOSE: NORMAL H
TME LAST DOSE: NORMAL H
WBC # BLD AUTO: 6.7 10E3/UL (ref 4–11)

## 2023-07-31 PROCEDURE — 83735 ASSAY OF MAGNESIUM: CPT

## 2023-07-31 PROCEDURE — 82977 ASSAY OF GGT: CPT

## 2023-07-31 PROCEDURE — 85610 PROTHROMBIN TIME: CPT | Mod: QW

## 2023-07-31 PROCEDURE — 82570 ASSAY OF URINE CREATININE: CPT

## 2023-07-31 PROCEDURE — 36415 COLL VENOUS BLD VENIPUNCTURE: CPT

## 2023-07-31 PROCEDURE — 86833 HLA CLASS II HIGH DEFIN QUAL: CPT

## 2023-07-31 PROCEDURE — 82043 UR ALBUMIN QUANTITATIVE: CPT

## 2023-07-31 PROCEDURE — 80053 COMPREHEN METABOLIC PANEL: CPT

## 2023-07-31 PROCEDURE — 99000 SPECIMEN HANDLING OFFICE-LAB: CPT

## 2023-07-31 PROCEDURE — 85027 COMPLETE CBC AUTOMATED: CPT

## 2023-07-31 PROCEDURE — 82248 BILIRUBIN DIRECT: CPT

## 2023-07-31 PROCEDURE — 86828 HLA CLASS I&II ANTIBODY QUAL: CPT

## 2023-07-31 PROCEDURE — 84156 ASSAY OF PROTEIN URINE: CPT

## 2023-07-31 PROCEDURE — 84100 ASSAY OF PHOSPHORUS: CPT

## 2023-07-31 PROCEDURE — 87799 DETECT AGENT NOS DNA QUANT: CPT

## 2023-07-31 PROCEDURE — 87516 HEPATITIS B DNA AMP PROBE: CPT | Mod: 90

## 2023-07-31 PROCEDURE — 80321 ALCOHOLS BIOMARKERS 1OR 2: CPT | Mod: 90

## 2023-07-31 PROCEDURE — 86832 HLA CLASS I HIGH DEFIN QUAL: CPT

## 2023-07-31 PROCEDURE — 87521 HEPATITIS C PROBE&RVRS TRNSC: CPT | Mod: 90

## 2023-07-31 PROCEDURE — 80197 ASSAY OF TACROLIMUS: CPT

## 2023-07-31 PROCEDURE — 87535 HIV-1 PROBE&REVERSE TRNSCRPJ: CPT | Mod: 90

## 2023-07-31 NOTE — PROGRESS NOTES
ANTICOAGULATION MANAGEMENT     Damion Quinones 67 year old male is on warfarin with therapeutic INR result. (Goal INR 1.5-2.0)    Recent labs: (last 7 days)     07/31/23  0823   INR 1.59*       ASSESSMENT     Warfarin Lab Questionnaire    Warfarin Doses Last 7 Days      7/31/2023     7:59 AM   Dose in Tablet or Mg   TAB or MG? milligram (mg)     Pt Rptd Dose SUNDAY MONDAY TUESDAY WED THURS FRIDAY SATURDAY 7/31/2023   7:59 AM 1 1 1 1 1 1 1         7/31/2023   Warfarin Lab Questionnaire   Missed doses within past 14 days? No   Changes in diet or alcohol within past 14 days? No   Medication changes since last result? No   Injuries or illness since last result? No   New shortness of breath, severe headaches or sudden changes in vision since last result? No   Abnormal bleeding since last result? No   Upcoming surgery, procedure? No   Best number to call with results? [phone]     Previous result: Supratherapeutic  Additional findings: None       PLAN     Recommended plan for ongoing change(s) affecting INR     Dosing Instructions: Continue your current warfarin dose with next INR in 4 days       Summary  As of 7/31/2023      Full warfarin instructions:  1 mg every day   Next INR check:  8/4/2023               Telephone call with Casey and wife Apoorva who verbalizes understanding and agrees to plan and who agrees to plan and repeated back plan correctly    Lab visit scheduled    Education provided:   Goal range and lab monitoring: goal range and significance of current result  Contact [phone] with any changes, questions or concerns.     Plan made per [facility] anticoagulation protocol    Alejandra Naylor RN  Anticoagulation Clinic  7/31/2023    _______________________________________________________________________     Anticoagulation Episode Summary       Current INR goal:  1.5-2.0   TTR:  29.7 % (3.4 wk)   Target end date:  5/3/2024   Send INR reminders to:  [facility] [place]    Indications    Chronic atrial fibrillation  (H) [I48.20]  Liver transplant recipient (H) [Z94.4]             Comments:               Anticoagulation Care Providers       Provider Role Specialty Phone number    Gary Serrano MD Referring Family Medicine [phone]    Lashay Rachel MD Referring Internal Medicine [phone]

## 2023-07-31 NOTE — TELEPHONE ENCOUNTER
DATE:  7/31/2023     TIME OF RECEIPT FROM LAB:  10:42    ORDERING PROVIDER: Chad Clifton    LAB TEST: hgb     LAB VALUE:  7.8    RESULTS GIVEN WITH READ-BACK TO (PROVIDER):  Cindy Clay    TIME LAB VALUE REPORTED TO PROVIDER: 10:50

## 2023-07-31 NOTE — PROGRESS NOTES
LFTs elevated, Dr. Clifton notified and requested a GGT be added to today's labs. Add on GGT ordered.

## 2023-08-01 ENCOUNTER — TELEPHONE (OUTPATIENT)
Dept: TRANSPLANT | Facility: CLINIC | Age: 68
End: 2023-08-01

## 2023-08-01 ENCOUNTER — OFFICE VISIT (OUTPATIENT)
Dept: TRANSPLANT | Facility: CLINIC | Age: 68
End: 2023-08-01
Attending: INTERNAL MEDICINE
Payer: COMMERCIAL

## 2023-08-01 ENCOUNTER — VIRTUAL VISIT (OUTPATIENT)
Dept: PHARMACY | Facility: CLINIC | Age: 68
End: 2023-08-01
Payer: COMMERCIAL

## 2023-08-01 ENCOUNTER — ANCILLARY PROCEDURE (OUTPATIENT)
Dept: GENERAL RADIOLOGY | Facility: CLINIC | Age: 68
End: 2023-08-01
Attending: INTERNAL MEDICINE
Payer: COMMERCIAL

## 2023-08-01 ENCOUNTER — LAB (OUTPATIENT)
Dept: LAB | Facility: CLINIC | Age: 68
End: 2023-08-01
Attending: INTERNAL MEDICINE
Payer: COMMERCIAL

## 2023-08-01 VITALS
DIASTOLIC BLOOD PRESSURE: 77 MMHG | BODY MASS INDEX: 33.77 KG/M2 | HEART RATE: 100 BPM | SYSTOLIC BLOOD PRESSURE: 110 MMHG | OXYGEN SATURATION: 99 % | WEIGHT: 215.6 LBS | TEMPERATURE: 98.4 F

## 2023-08-01 DIAGNOSIS — Z46.6 ENCOUNTER FOR REMOVAL OF URETERAL STENT: Primary | ICD-10-CM

## 2023-08-01 DIAGNOSIS — D64.9 ANEMIA: ICD-10-CM

## 2023-08-01 DIAGNOSIS — Z94.4 LIVER TRANSPLANT RECIPIENT (H): ICD-10-CM

## 2023-08-01 DIAGNOSIS — D63.1 ANEMIA IN STAGE 2 CHRONIC KIDNEY DISEASE: ICD-10-CM

## 2023-08-01 DIAGNOSIS — Z94.0 KIDNEY REPLACED BY TRANSPLANT: ICD-10-CM

## 2023-08-01 DIAGNOSIS — I48.20 CHRONIC ATRIAL FIBRILLATION (H): ICD-10-CM

## 2023-08-01 DIAGNOSIS — Z48.298 AFTERCARE FOLLOWING ORGAN TRANSPLANT: ICD-10-CM

## 2023-08-01 DIAGNOSIS — N18.30 CHRONIC KIDNEY DISEASE, STAGE 3 UNSPECIFIED (H): ICD-10-CM

## 2023-08-01 DIAGNOSIS — N18.2 ANEMIA IN STAGE 2 CHRONIC KIDNEY DISEASE: ICD-10-CM

## 2023-08-01 DIAGNOSIS — Z94.0 KIDNEY REPLACED BY TRANSPLANT: Primary | ICD-10-CM

## 2023-08-01 DIAGNOSIS — R11.0 NAUSEA: ICD-10-CM

## 2023-08-01 DIAGNOSIS — Z94.0 KIDNEY TRANSPLANT RECIPIENT: Primary | ICD-10-CM

## 2023-08-01 DIAGNOSIS — E78.49 OTHER HYPERLIPIDEMIA: ICD-10-CM

## 2023-08-01 DIAGNOSIS — K21.9 GASTROESOPHAGEAL REFLUX DISEASE, UNSPECIFIED WHETHER ESOPHAGITIS PRESENT: ICD-10-CM

## 2023-08-01 DIAGNOSIS — I82.90 DEEP VEIN THROMBOSIS (DVT) OF NON-EXTREMITY VEIN, UNSPECIFIED CHRONICITY: ICD-10-CM

## 2023-08-01 DIAGNOSIS — Z94.0 HTN, KIDNEY TRANSPLANT RELATED: ICD-10-CM

## 2023-08-01 DIAGNOSIS — I48.0 PAF (PAROXYSMAL ATRIAL FIBRILLATION) (H): ICD-10-CM

## 2023-08-01 DIAGNOSIS — M1A.9XX1 TOPHACEOUS GOUT: ICD-10-CM

## 2023-08-01 DIAGNOSIS — Z79.899 ENCOUNTER FOR LONG-TERM CURRENT USE OF MEDICATION: Primary | ICD-10-CM

## 2023-08-01 DIAGNOSIS — D84.9 IMMUNOSUPPRESSED STATUS (H): ICD-10-CM

## 2023-08-01 DIAGNOSIS — Z94.4 LIVER REPLACED BY TRANSPLANT (H): ICD-10-CM

## 2023-08-01 DIAGNOSIS — I15.1 HTN, KIDNEY TRANSPLANT RELATED: ICD-10-CM

## 2023-08-01 PROBLEM — E44.0 MODERATE PROTEIN-CALORIE MALNUTRITION (H): Status: RESOLVED | Noted: 2023-03-01 | Resolved: 2023-08-01

## 2023-08-01 PROBLEM — K72.10 END-STAGE LIVER DISEASE (H): Status: RESOLVED | Noted: 2023-01-15 | Resolved: 2023-08-01

## 2023-08-01 PROBLEM — Z99.2 ESRD (END STAGE RENAL DISEASE) ON DIALYSIS (H): Status: RESOLVED | Noted: 2023-01-27 | Resolved: 2023-08-01

## 2023-08-01 PROBLEM — I82.401 ACUTE DEEP VEIN THROMBOSIS (DVT) OF RIGHT LOWER EXTREMITY, UNSPECIFIED VEIN (H): Status: RESOLVED | Noted: 2023-03-09 | Resolved: 2023-08-01

## 2023-08-01 PROBLEM — N18.6 ESRD (END STAGE RENAL DISEASE) ON DIALYSIS (H): Status: RESOLVED | Noted: 2023-01-27 | Resolved: 2023-08-01

## 2023-08-01 PROBLEM — G89.18 ACUTE POST-OPERATIVE PAIN: Status: RESOLVED | Noted: 2023-06-23 | Resolved: 2023-08-01

## 2023-08-01 PROBLEM — Z01.810 PRE-OPERATIVE CARDIOVASCULAR EXAMINATION: Status: RESOLVED | Noted: 2023-04-27 | Resolved: 2023-08-01

## 2023-08-01 PROBLEM — G93.40 ENCEPHALOPATHY: Status: RESOLVED | Noted: 2023-04-07 | Resolved: 2023-08-01

## 2023-08-01 PROBLEM — K74.60 ESOPHAGEAL VARICES IN CIRRHOSIS (H): Status: RESOLVED | Noted: 2022-09-24 | Resolved: 2023-08-01

## 2023-08-01 PROBLEM — I48.91 ATRIAL FIBRILLATION WITH RAPID VENTRICULAR RESPONSE (H): Status: RESOLVED | Noted: 2023-06-20 | Resolved: 2023-08-01

## 2023-08-01 PROBLEM — K31.819 GAVE (GASTRIC ANTRAL VASCULAR ECTASIA): Status: RESOLVED | Noted: 2022-12-30 | Resolved: 2023-08-01

## 2023-08-01 PROBLEM — A04.71 RECURRENT CLOSTRIDIOIDES DIFFICILE DIARRHEA: Status: RESOLVED | Noted: 2023-02-15 | Resolved: 2023-08-01

## 2023-08-01 PROBLEM — I85.10 ESOPHAGEAL VARICES IN CIRRHOSIS (H): Status: RESOLVED | Noted: 2022-09-24 | Resolved: 2023-08-01

## 2023-08-01 PROBLEM — D72.829 LEUKOCYTOSIS, UNSPECIFIED TYPE: Status: RESOLVED | Noted: 2023-01-15 | Resolved: 2023-08-01

## 2023-08-01 PROBLEM — Z98.890 STATUS POST CORONARY ANGIOGRAM: Status: RESOLVED | Noted: 2023-04-27 | Resolved: 2023-08-01

## 2023-08-01 PROBLEM — T86.19 DELAYED GRAFT FUNCTION OF KIDNEY: Status: RESOLVED | Noted: 2023-06-20 | Resolved: 2023-08-01

## 2023-08-01 PROBLEM — Z95.828 S/P TIPS (TRANSJUGULAR INTRAHEPATIC PORTOSYSTEMIC SHUNT): Status: RESOLVED | Noted: 2022-10-01 | Resolved: 2023-08-01

## 2023-08-01 PROBLEM — Z79.2 ADMINISTRATION OF LONG-TERM PROPHYLACTIC ANTIBIOTICS: Status: RESOLVED | Noted: 2023-06-10 | Resolved: 2023-08-01

## 2023-08-01 PROBLEM — K56.7 ILEUS, POSTOPERATIVE (H): Status: RESOLVED | Noted: 2023-06-13 | Resolved: 2023-08-01

## 2023-08-01 PROBLEM — R78.81 BACTEREMIA DUE TO ENTEROCOCCUS: Status: RESOLVED | Noted: 2023-06-09 | Resolved: 2023-08-01

## 2023-08-01 PROBLEM — E43 SEVERE MALNUTRITION (H): Status: RESOLVED | Noted: 2023-06-23 | Resolved: 2023-08-01

## 2023-08-01 PROBLEM — K91.89 ILEUS, POSTOPERATIVE (H): Status: RESOLVED | Noted: 2023-06-13 | Resolved: 2023-08-01

## 2023-08-01 PROBLEM — I95.9 HYPOTENSION: Status: RESOLVED | Noted: 2023-06-20 | Resolved: 2023-08-01

## 2023-08-01 PROBLEM — B95.2 BACTEREMIA DUE TO ENTEROCOCCUS: Status: RESOLVED | Noted: 2023-06-09 | Resolved: 2023-08-01

## 2023-08-01 PROBLEM — K70.31 ALCOHOLIC CIRRHOSIS OF LIVER WITH ASCITES (H): Status: RESOLVED | Noted: 2019-10-11 | Resolved: 2023-08-01

## 2023-08-01 PROBLEM — F10.99 ALCOHOL USE, UNSPECIFIED WITH UNSPECIFIED ALCOHOL-INDUCED DISORDER (H): Status: RESOLVED | Noted: 2023-03-09 | Resolved: 2023-08-01

## 2023-08-01 PROBLEM — R53.81 PHYSICAL DECONDITIONING: Status: RESOLVED | Noted: 2023-05-11 | Resolved: 2023-08-01

## 2023-08-01 LAB
ALBUMIN MFR UR ELPH: 24.1 MG/DL
ALBUMIN SERPL BCG-MCNC: 3.4 G/DL (ref 3.5–5.2)
ALP SERPL-CCNC: 251 U/L (ref 40–129)
ALT SERPL W P-5'-P-CCNC: 69 U/L (ref 0–70)
ANION GAP SERPL CALCULATED.3IONS-SCNC: 13 MMOL/L (ref 7–15)
AST SERPL W P-5'-P-CCNC: 39 U/L (ref 0–45)
BILIRUB DIRECT SERPL-MCNC: 0.27 MG/DL (ref 0–0.3)
BILIRUB SERPL-MCNC: 0.5 MG/DL
BKV DNA # SPEC NAA+PROBE: NOT DETECTED COPIES/ML
BUN SERPL-MCNC: 23.5 MG/DL (ref 8–23)
CALCIUM SERPL-MCNC: 9.5 MG/DL (ref 8.8–10.2)
CHLORIDE SERPL-SCNC: 98 MMOL/L (ref 98–107)
CREAT SERPL-MCNC: 1.31 MG/DL (ref 0.67–1.17)
CREAT UR-MCNC: 81.4 MG/DL
DEPRECATED CALCIDIOL+CALCIFEROL SERPL-MC: 42 UG/L (ref 20–75)
DEPRECATED HCO3 PLAS-SCNC: 25 MMOL/L (ref 22–29)
FERRITIN SERPL-MCNC: 506 NG/ML (ref 31–409)
GFR SERPL CREATININE-BSD FRML MDRD: 60 ML/MIN/1.73M2
GLUCOSE SERPL-MCNC: 165 MG/DL (ref 70–99)
IRON BINDING CAPACITY (ROCHE): 231 UG/DL (ref 240–430)
IRON SATN MFR SERPL: 10 % (ref 15–46)
IRON SERPL-MCNC: 23 UG/DL (ref 61–157)
MAGNESIUM SERPL-MCNC: 1.4 MG/DL (ref 1.7–2.3)
PHOSPHATE SERPL-MCNC: 2.3 MG/DL (ref 2.5–4.5)
POTASSIUM SERPL-SCNC: 4.3 MMOL/L (ref 3.4–5.3)
PROT SERPL-MCNC: 6.1 G/DL (ref 6.4–8.3)
PROT/CREAT 24H UR: 0.3 MG/MG CR (ref 0–0.2)
PTH-INTACT SERPL-MCNC: 45 PG/ML (ref 15–65)
SODIUM SERPL-SCNC: 136 MMOL/L (ref 136–145)

## 2023-08-01 PROCEDURE — 250N000013 HC RX MED GY IP 250 OP 250 PS 637: Performed by: NURSE PRACTITIONER

## 2023-08-01 PROCEDURE — 86833 HLA CLASS II HIGH DEFIN QUAL: CPT | Performed by: INTERNAL MEDICINE

## 2023-08-01 PROCEDURE — 87799 DETECT AGENT NOS DNA QUANT: CPT | Performed by: INTERNAL MEDICINE

## 2023-08-01 PROCEDURE — 82248 BILIRUBIN DIRECT: CPT | Performed by: PATHOLOGY

## 2023-08-01 PROCEDURE — 83970 ASSAY OF PARATHORMONE: CPT | Performed by: PATHOLOGY

## 2023-08-01 PROCEDURE — 83540 ASSAY OF IRON: CPT | Performed by: PATHOLOGY

## 2023-08-01 PROCEDURE — 80053 COMPREHEN METABOLIC PANEL: CPT | Performed by: PATHOLOGY

## 2023-08-01 PROCEDURE — G0463 HOSPITAL OUTPT CLINIC VISIT: HCPCS | Performed by: INTERNAL MEDICINE

## 2023-08-01 PROCEDURE — 86828 HLA CLASS I&II ANTIBODY QUAL: CPT | Mod: XU | Performed by: INTERNAL MEDICINE

## 2023-08-01 PROCEDURE — 83550 IRON BINDING TEST: CPT | Performed by: PATHOLOGY

## 2023-08-01 PROCEDURE — 36415 COLL VENOUS BLD VENIPUNCTURE: CPT | Performed by: PATHOLOGY

## 2023-08-01 PROCEDURE — 86832 HLA CLASS I HIGH DEFIN QUAL: CPT | Performed by: INTERNAL MEDICINE

## 2023-08-01 PROCEDURE — 250N000009 HC RX 250: Performed by: NURSE PRACTITIONER

## 2023-08-01 PROCEDURE — 99207 PR NO CHARGE LOS: CPT | Performed by: PHARMACIST

## 2023-08-01 PROCEDURE — 82728 ASSAY OF FERRITIN: CPT | Performed by: PATHOLOGY

## 2023-08-01 PROCEDURE — 83735 ASSAY OF MAGNESIUM: CPT | Performed by: PATHOLOGY

## 2023-08-01 PROCEDURE — 84100 ASSAY OF PHOSPHORUS: CPT | Performed by: PATHOLOGY

## 2023-08-01 PROCEDURE — 74018 RADEX ABDOMEN 1 VIEW: CPT | Mod: GC | Performed by: STUDENT IN AN ORGANIZED HEALTH CARE EDUCATION/TRAINING PROGRAM

## 2023-08-01 PROCEDURE — 52310 CYSTOSCOPY AND TREATMENT: CPT | Performed by: NURSE PRACTITIONER

## 2023-08-01 PROCEDURE — 82306 VITAMIN D 25 HYDROXY: CPT | Performed by: INTERNAL MEDICINE

## 2023-08-01 PROCEDURE — 99000 SPECIMEN HANDLING OFFICE-LAB: CPT | Performed by: PATHOLOGY

## 2023-08-01 PROCEDURE — 80180 DRUG SCRN QUAN MYCOPHENOLATE: CPT | Performed by: INTERNAL MEDICINE

## 2023-08-01 PROCEDURE — 99213 OFFICE O/P EST LOW 20 MIN: CPT | Performed by: INTERNAL MEDICINE

## 2023-08-01 PROCEDURE — 99215 OFFICE O/P EST HI 40 MIN: CPT | Mod: 24 | Performed by: INTERNAL MEDICINE

## 2023-08-01 RX ORDER — VALGANCICLOVIR 450 MG/1
900 TABLET, FILM COATED ORAL DAILY
Qty: 180 TABLET | Refills: 0 | Status: SHIPPED | OUTPATIENT
Start: 2023-08-01 | End: 2023-08-03

## 2023-08-01 RX ORDER — POTASSIUM CHLORIDE 1500 MG/1
20 TABLET, EXTENDED RELEASE ORAL 2 TIMES DAILY
Qty: 60 TABLET | Refills: 0 | COMMUNITY
Start: 2023-08-01 | End: 2023-08-11

## 2023-08-01 RX ORDER — SIMETHICONE 125 MG
250 TABLET,CHEWABLE ORAL 2 TIMES DAILY
COMMUNITY
End: 2023-08-29

## 2023-08-01 RX ORDER — LEVOFLOXACIN 250 MG/1
500 TABLET, FILM COATED ORAL ONCE
Status: COMPLETED | OUTPATIENT
Start: 2023-08-01 | End: 2023-08-01

## 2023-08-01 RX ORDER — BUMETANIDE 1 MG/1
1 TABLET ORAL DAILY
Qty: 60 TABLET | Refills: 0 | COMMUNITY
Start: 2023-08-01 | End: 2023-08-11

## 2023-08-01 RX ORDER — LIDOCAINE HYDROCHLORIDE 20 MG/ML
JELLY TOPICAL ONCE
Status: COMPLETED | OUTPATIENT
Start: 2023-08-01 | End: 2023-08-01

## 2023-08-01 RX ORDER — ONDANSETRON 4 MG/1
4 TABLET, FILM COATED ORAL EVERY 8 HOURS PRN
Qty: 60 TABLET | Refills: 1 | Status: SHIPPED | OUTPATIENT
Start: 2023-08-01 | End: 2023-08-29

## 2023-08-01 RX ORDER — METOPROLOL TARTRATE 25 MG/1
12.5 TABLET, FILM COATED ORAL 2 TIMES DAILY
Qty: 60 TABLET | Refills: 0 | COMMUNITY
Start: 2023-08-01 | End: 2023-08-22

## 2023-08-01 RX ORDER — TACROLIMUS 1 MG/1
CAPSULE ORAL
Qty: 630 CAPSULE | Refills: 3 | Status: SHIPPED | OUTPATIENT
Start: 2023-08-01 | End: 2023-08-08

## 2023-08-01 RX ADMIN — LEVOFLOXACIN 500 MG: 250 TABLET, FILM COATED ORAL at 11:23

## 2023-08-01 RX ADMIN — LIDOCAINE HYDROCHLORIDE: 20 JELLY TOPICAL at 11:23

## 2023-08-01 ASSESSMENT — PAIN SCALES - GENERAL: PAINLEVEL: MODERATE PAIN (5)

## 2023-08-01 NOTE — PROGRESS NOTES
TRANSPLANT NEPHROLOGY EARLY POST TRANSPLANT VISIT    Assessment & Plan   # DDKT (K): Trend up   - Baseline Creatinine: ~ 1-1.2   - Proteinuria: Not checked post transplant, check now    - Date DSA Last Checked: Jun/2023      Latest DSA: No DSA at time of transplant, recheck pending from today   - BK Viremia:  pending from 7/31/23   - Kidney Tx Biopsy: Jun 20, 2023; Result: negative for rejection. Focal ATN   - Transplant Ureteral Stent: Yes; Scheduled removal date Aug 01, 2023    -Tunneled line removed 7/3/23     # Liver Tx (K): LFTs slightly elevated on 7/31/23 labs with trend up from 7/26.     # Immunosuppression: Tacrolimus immediate release (goal 8-10), Mycophenolic acid (dose 540 mg every 12 hours), and Prednisone (dose 10 mg daily)   - Induction with Recent Transplant:   rATG total 4mg/kg, stopped 2/2 sepsis   - Continue with intensive monitoring of immunosuppression for efficacy and toxicity.   - Changes: Not at this time because still with gout flare. Plan to wean prednisone to 5mg daily but gout will need to be under control. Seeing rheumatology tmrw. Repeat MPA level pending    # Infection Prophylaxis:   - PJP: Sulfa/TMP (Bactrim)  - CMV: Valganciclovir (Valcyte), increase to 900mg daily x3m   -Thrush: nystatin, stop     # Hypertension: Controlled, but low at times;  Goal BP: < 130/80   - Volume status: Euvolemic  EDW: 215lbs   - Changes: Yes - decrease bumex to 1mg daily  and decrease metoprolol to 12.5mg bid.    # Diabetes: Controlled (HbA1c <7%) Last HbA1c: 5.1%   - Management as per primary care.      # Anemia in Chronic Renal Disease: Hgb: Stable, low      FEDERICO: No   - Iron studies: Replete, refer to anemia services    # Mineral Bone Disorder:   - Secondary renal hyperparathyroidism; PTH level: Minimally elevated ( pg/ml)        On treatment: None  - Vitamin D; level: Normal        On supplement: No  - Calcium; level: Normal        On supplement: No  - Phosphorus; level: Normal        On  supplement: No    # Electrolytes:   - Potassium; level: Normal        On supplement: Yes, Kcl 40meq bid, decrease to 20meq bid   - Magnesium; level: Stable low        On supplement: Yes, mag ox 800mg bid  - Bicarbonate; level: Normal        On supplement: No    # Paroxysmal A-Fib:    -Stable on metoprolol and anticoagulated on warfarin. INR followed by coumadin clinic    # E. Faecium Bacteremia: Diagnosed on 6/9 BlCx. Treated with IV vancomycin      # ANCA Vasculitis: S/p Rituximab x2              - acute GN protocol    -Obtain U/A in 2 weeks after stent removal     # Gout: On prednisone 10 mg PO daily PTA. Currently on prednisone 10 with ongoing gout flare   -Uric acid 6.2              -Continue allopurinol 100mg daily. This should be increased but concern with ongoing gout flare. He will need to be on treatment prior to increasing. Additionally, would avoid colchicine 2/2 diarrhea and tacrolimus use              -Sees rheumatology tomorrow at [facility], Dr. Rucker. The patient was given my pager number to give to Dr. Rucker to call me to discuss.     # Umbilical hernia:   -discuss with Dr. 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    Return visit: Return in 4 weeks (on 8/29/2023) for previously  scheduled visit.    Subhash Dutta MD    Chief Complaint   Mr. Quinones is a 67 year old here for kidney transplant, immunosuppression management and hospital follow up after kidney transplant.     History of Present Illness    Casey was discharged from TCU on 7/27/23. He states that he is nauseated early in the morning. This is causing him to have decreased appetite. He denies vomiting. He denies diarrhea. He has not been having fevers but no chills. He denies SOB, chest pain, LE edema. He is eating more as the day goes on. The patient's wife notes that he is more short of breath and tired. He is actually sleeping now. He continues to have gout symptoms in his hands and feet.     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)     Esophageal varices in cirrhosis (H)     Essential hypertension     Family history of colon cancer     Family history of prostate cancer     GAVE (gastric antral vascular ectasia)     Hereditary hemochromatosis (H)     Other hyperlipidemia     Psoriasis     S/P TIPS (transjugular intrahepatic portosystemic shunt)     Tophaceous gout     Deep vein thrombosis (DVT) of non-extremity vein, unspecified chronicity     Moderate protein-calorie malnutrition (H)     Alcohol use, unspecified with unspecified alcohol-induced disorder (H)     Acute deep vein thrombosis (DVT) of right lower extremity, unspecified vein (H)     Encephalopathy     Pre-operative cardiovascular examination     CKD (chronic kidney disease) stage 5, GFR less than 15 ml/min (H)     Status post coronary angiogram     Recurrent Clostridioides difficile diarrhea      Chronic atrial fibrillation (H)     Physical deconditioning     Liver transplant recipient (H)     Bacteremia due to Enterococcus     Administration of long-term prophylactic antibiotics     Immunosuppressed status (H)     Ileus, postoperative (H)     Atrial fibrillation with rapid ventricular response (H)     Delayed graft function of kidney     Hypotension     Kidney transplant recipient     Acute post-operative pain     CAD (coronary artery disease)     Severe malnutrition (H)     Steroid-induced hyperglycemia     Muscular deconditioning       Allergies   No Known Allergies    Medications   Current Outpatient Medications   Medication Sig     Alcohol Swabs PADS Use to swab the area of the injection or quyen as directed Per insurance coverage     allopurinol (ZYLOPRIM) 100 MG tablet Take 1 tablet (100 mg) by mouth daily     aspirin (ASA) 325 MG tablet 1 tablet (325 mg) by Oral or Feeding Tube route daily     atorvastatin (LIPITOR) 20 MG tablet 1 tablet (20 mg) by Oral or Feeding Tube route every evening     blood glucose (NO BRAND SPECIFIED) lancets standard To use to test glucose level in the blood Use to test blood sugar  4  times daily as directed. To accompany glucose monitor brands per insurance coverage.     blood glucose (NO BRAND SPECIFIED) test strip To use to test glucose level in the blood Use to test blood sugar  4 times daily as directed. To accompany glucose monitor brands per insurance coverage.     blood glucose monitoring (NO BRAND SPECIFIED) meter device kit Use as directed Per insurance coverage     bumetanide (BUMEX) 1 MG tablet Take 1 tablet (1 mg) by mouth 2 times daily     diclofenac (VOLTAREN) 1 % topical gel Apply 4 g topically 3 times daily     hydrocortisone (CORTAID) 1 % external cream Apply topically 2 times daily     insulin aspart (NOVOLOG PEN) 100 UNIT/ML pen Inject 1 Units Subcutaneous 3 times daily (with meals) 1 unit of insulin with 12 grams of carb with breakfast / lunch and  dinner .     insulin glargine (LANTUS PEN) 100 UNIT/ML pen Inject 5 Units Subcutaneous At Bedtime     insulin pen needle (32G X 4 MM) 32G X 4 MM miscellaneous Use as directed by provider Per insurance coverage     magnesium oxide (MAG-OX) 400 MG tablet Take 2 tablets (800 mg) by mouth 2 times daily     methocarbamol (ROBAXIN) 500 MG tablet 1 tablet (500 mg) by Oral or Feeding Tube route 4 times daily as needed for muscle spasms     metoprolol tartrate (LOPRESSOR) 25 MG tablet 0.5 tablets (12.5 mg) by Oral or Feeding Tube route every 8 hours     multivitamin w/minerals (THERA-VIT-M) tablet Take 1 tablet by mouth daily     MYFORTIC (BRAND) 180 MG EC tablet Take 3 tablets (540 mg) by mouth 2 times daily     nystatin (MYCOSTATIN) 391308 UNIT/ML suspension Take 5 mLs (500,000 Units) by mouth 4 times daily for 14 days     pantoprazole (PROTONIX) 40 MG EC tablet Take 1 tablet (40 mg) by mouth every morning (before breakfast)     polyethylene glycol (MIRALAX) 17 GM/Dose powder Take 17 g by mouth daily     potassium chloride ER (KLOR-CON M) 20 MEQ CR tablet Take 2 tablets (40 mEq) by mouth 2 times daily     predniSONE (DELTASONE) 10 MG tablet Take 1 tablet (10 mg) by mouth daily     Sharps Container MISC Use as directed to dispose of needles, lancets and other sharps     sulfamethoxazole-trimethoprim (BACTRIM) 400-80 MG tablet Take 1 tablet by mouth daily     tacrolimus (GENERIC EQUIVALENT) 1 MG capsule Take 3 capsules (3 mg) by mouth 2 times daily     ursodiol (ACTIGALL) 250 MG tablet Take 1 tablet (250 mg) by mouth 2 times daily     valGANciclovir (VALCYTE) 450 MG tablet Take 1 tablet (450 mg) by mouth daily     warfarin ANTICOAGULANT (COUMADIN) 1 MG tablet Take 1 tablet (1 mg) by mouth daily     No current facility-administered medications for this visit.     Medications Discontinued During This Encounter   Medication Reason     nystatin (MYCOSTATIN) 348852 UNIT/ML suspension      bumetanide (BUMEX) 1 MG tablet Reorder  (No AVS)     metoprolol tartrate (LOPRESSOR) 25 MG tablet Reorder (No AVS)     potassium chloride ER (KLOR-CON M) 20 MEQ CR tablet Reorder (No AVS)     polyethylene glycol (MIRALAX) 17 GM/Dose powder      diclofenac (VOLTAREN) 1 % topical gel      methocarbamol (ROBAXIN) 500 MG tablet      valGANciclovir (VALCYTE) 450 MG tablet Reorder (No AVS)       Physical Exam   Vital Signs: /77   Pulse 100   Temp 98.4  F (36.9  C)   Wt 97.8 kg (215 lb 9.6 oz)   SpO2 99%   BMI 33.77 kg/m      GENERAL APPEARANCE: alert and no distress  HENT: mouth without ulcers or lesions  LYMPHATICS: no cervical or supraclavicular nodes  RESP: lungs clear to auscultation - no rales, rhonchi or wheezes  CV: regular rhythm, normal rate, no rub, no murmur  EDEMA: no LE edema bilaterally  ABDOMEN: soft, nondistended, nontender, bowel sounds normal  MS: extremities normal - no gross deformities noted, no evidence of inflammation in joints, no muscle tenderness  SKIN: no rash  NEURO: normal strength and tone, sensory exam grossly normal, mentation intact and speech normal  PSYCH: mentation appears normal and affect normal/bright  TX KIDNEY: normal  DIALYSIS ACCESS: none    Data         Latest Ref Rng & Units 7/31/2023     8:23 AM 7/27/2023     8:55 AM 7/27/2023     5:52 AM   Renal   Sodium 136 - 145 mmol/L 136      K 3.4 - 5.3 mmol/L 3.9   4.0    Cl 98 - 107 mmol/L 98      Cl (external) 98 - 107 mmol/L 98      CO2 22 - 29 mmol/L 23      Urea Nitrogen 8.0 - 23.0 mg/dL 23.5      Creatinine 0.67 - 1.17 mg/dL 1.24      Glucose 70 - 99 mg/dL 102  117     Calcium 8.8 - 10.2 mg/dL 9.4      Magnesium 1.7 - 2.3 mg/dL 1.5   1.6          Latest Ref Rng & Units 7/31/2023     8:23 AM 7/27/2023     5:52 AM 7/26/2023     5:56 AM   Bone Health   Phosphorus 2.5 - 4.5 mg/dL 2.5  2.6  2.6          Latest Ref Rng & Units 7/31/2023     8:23 AM 7/20/2023     8:34 PM 7/19/2023     5:55 AM   Heme   WBC 4.0 - 11.0 10e3/uL 6.7  8.7  7.4    Hgb 13.3 - 17.7 g/dL 7.8   7.3  7.4    Plt 150 - 450 10e3/uL 315  206  204    ABSOLUTE NEUTROPHIL 1.6 - 8.3 10e3/uL   6.4    ABSOLUTE LYMPHOCYTES 0.8 - 5.3 10e3/uL   0.1    ABSOLUTE MONOCYTES 0.0 - 1.3 10e3/uL   0.4    ABSOLUTE EOSINOPHILS 0.0 - 0.7 10e3/uL   0.1          Latest Ref Rng & Units 7/31/2023     8:23 AM 7/26/2023     5:56 AM 7/24/2023     8:01 AM   Liver   AP 40 - 129 U/L 260  219  221    TBili <=1.2 mg/dL 0.4  0.6  0.5    Bilirubin Direct 0.00 - 0.30 mg/dL <0.20      ALT 0 - 70 U/L 79  45  52    AST 0 - 45 U/L 46  24  39    Tot Protein 6.4 - 8.3 g/dL 6.3  5.6  5.6    Albumin 3.5 - 5.2 g/dL 3.5  3.0  3.0          Latest Ref Rng & Units 6/7/2023     3:30 AM 6/6/2023     9:08 PM 6/5/2023     4:05 PM   Pancreas   A1C <5.7 %  5.1     Amylase 28 - 100 U/L 90   153    Lipase (Roche) 13 - 60 U/L 59            Latest Ref Rng & Units 11/22/2022     8:48 AM   Iron studies   Iron 61 - 157 ug/dL 68    Iron Sat Index 15 - 46 % 31    Ferritin 31 - 409 ng/mL 429          Latest Ref Rng & Units 6/5/2023     4:05 PM 11/22/2022     8:48 AM   [facility] Virology   EBV CAPSID ANTIBODY IGG No detectable antibody. Positive  Positive         Recent Labs   Lab Test 06/19/23  0354 06/20/23  0604 07/03/23  0708 07/05/23  0707 07/24/23  0801 07/26/23  0556 07/31/23  0823   DOSTAC 6/16/2023  --  7/2/2023  --   --   --  7/30/2023   TACROL 3.9*   < > 7.3   < > 6.6 7.4 6.1    < > = values in this interval not displayed.     Recent Labs   Lab Test 07/10/23  0613   MPACID 0.73*   MPAG 89.6

## 2023-08-01 NOTE — LETTER
8/1/2023         RE: Damion Quinones  [address] [facility] [address]        Dear Colleague,    Thank you for referring your patient, Damion Quinones, to the [facility]. Please see a copy of my visit note below.    See procedure note       Again, thank you for allowing me to participate in the care of your patient.        Sincerely,        Lorna Naik NP

## 2023-08-01 NOTE — NURSING NOTE
Chief Complaint   Patient presents with    RECHECK     Return visit.     Blood pressure 110/77, pulse 100, temperature 98.4  F (36.9  C), weight 97.8 kg (215 lb 9.6 oz), SpO2 99 %.  RANDOLPH HAAS

## 2023-08-01 NOTE — LETTER
PHYSICIAN ORDERS      DATE & TIME ISSUED: 2023 8:34 AM  PATIENT NAME: Damion Quinones   : 1955     [employer] MR# [if applicable]: [id number]     DIAGNOSIS:  Kidney Transplant  ICD-10 CODE: Z94.0     Please complete the following lab at the next routine transplant lab draw:  Random urine protein/creatinine ratio    Any questions please call: [phone]      Subhash Dutta MD

## 2023-08-01 NOTE — LETTER
8/1/2023         RE: Damion Quinones  [address] [facility] [address]        Dear Colleague,    Thank you for referring your patient, Damion Quinones, to the [facility] TRANSPLANT [facility]INIC. Please see a copy of my visit note below.    TRANSPLANT NEPHROLOGY EARLY POST TRANSPLANT VISIT    Assessment & Plan   # DDKT (K): Trend up   - Baseline Creatinine: ~ 1-1.2   - Proteinuria: Not checked post transplant, check now    - Date DSA Last Checked: Jun/2023      Latest DSA: No DSA at time of transplant, recheck pending from today   - BK Viremia:  pending from 7/31/23   - Kidney Tx Biopsy: Jun 20, 2023; Result: negative for rejection. Focal ATN   - Transplant Ureteral Stent: Yes; Scheduled removal date Aug 01, 2023    -Tunneled line removed 7/3/23     # Liver Tx (SLK): LFTs slightly elevated on 7/31/23 labs with trend up from 7/26.     # Immunosuppression: Tacrolimus immediate release (goal 8-10), Mycophenolic acid (dose 540 mg every 12 hours), and Prednisone (dose 10 mg daily)   - Induction with Recent Transplant:   rATG total 4mg/kg, stopped 2/2 sepsis   - Continue with intensive monitoring of immunosuppression for efficacy and toxicity.   - Changes: Not at this time because still with gout flare. Plan to wean prednisone to 5mg daily but gout will need to be under control. Seeing rheumatology tmrw. Repeat MPA level pending    # Infection Prophylaxis:   - PJP: Sulfa/TMP (Bactrim)  - CMV: Valganciclovir (Valcyte), increase to 900mg daily x3m   -Thrush: nystatin, stop     # Hypertension: Controlled, but low at times;  Goal BP: < 130/80   - Volume status: Euvolemic  EDW: 215lbs   - Changes: Yes - decrease bumex to 1mg daily  and decrease metoprolol to 12.5mg bid.    # Diabetes: Controlled (HbA1c <7%) Last HbA1c: 5.1%   - Management as per primary care.      # Anemia in Chronic Renal Disease: Hgb: Stable, low      FEDERICO: No   - Iron studies: Replete, refer to anemia services    # Mineral Bone Disorder:   -  Secondary renal hyperparathyroidism; PTH level: Minimally elevated ( pg/ml)        On treatment: None  - Vitamin D; level: Normal        On supplement: No  - Calcium; level: Normal        On supplement: No  - Phosphorus; level: Normal        On supplement: No    # Electrolytes:   - Potassium; level: Normal        On supplement: Yes, Kcl 40meq bid, decrease to 20meq bid   - Magnesium; level: Stable low        On supplement: Yes, mag ox 800mg bid  - Bicarbonate; level: Normal        On supplement: No    # Paroxysmal A-Fib:    -Stable on metoprolol and anticoagulated on warfarin. INR followed by coumadin clinic    # E. Faecium Bacteremia: Diagnosed on 6/9 BlCx. Treated with IV vancomycin      # ANCA Vasculitis: S/p Rituximab x2              - acute GN protocol    -Obtain U/A in 2 weeks after stent removal     # Gout: On prednisone 10 mg PO daily PTA. Currently on prednisone 10 with ongoing gout flare   -Uric acid 6.2              -Continue allopurinol 100mg daily. This should be increased but concern with ongoing gout flare. He will need to be on treatment prior to increasing. Additionally, would avoid colchicine 2/2 diarrhea and tacrolimus use              -Sees rheumatology tomorrow at [facility], Dr. Rucker. The patient was given my pager number to give to Dr. Rucker to call me to discuss.     # Umbilical hernia:   -discuss with Dr. 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  Tx: DDKT (K) and Liver Tx ([facility])  Transplant: 6/6/2023 (Liver), 6/6/2023 (Kidney)  Donor Type: Donation after Brain Death Donor Class: Standard Criteria Donor  Crossmatch at time of Tx: negative  DSA at time of Tx: Yes, to Cw9 w/   Significant changes in immunosuppression: None  CMV IgG Ab High Risk Discordance (D+/R-): No  EBV IgG Ab High Risk Discordance (D+/R-): No  Significant  transplant-related complications: [facility]    Transplant Office Phone Number: [phone]    Assessment and plan was discussed with the patient and he voiced his understanding and agreement.    Return visit: Return in 4 weeks (on 8/29/2023) for previously scheduled visit.    Subhash Dutta MD    Chief Complaint   Mr. Quinones is a 67 year old here for kidney transplant, immunosuppression management and hospital follow up after kidney transplant.     History of Present Illness    Casey was discharged from TCU on 7/27/23. He states that he is nauseated early in the morning. This is causing him to have decreased appetite. He denies vomiting. He denies diarrhea. He has not been having fevers but no chills. He denies SOB, chest pain, LE edema. He is eating more as the day goes on. The patient's wife notes that he is more short of breath and tired. He is actually sleeping now. He continues to have gout symptoms in his hands and feet.     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)     Esophageal varices in cirrhosis (H)     Essential hypertension     Family history of colon cancer     Family history of prostate cancer     GAVE (gastric antral vascular ectasia)     Hereditary hemochromatosis (H)     Other hyperlipidemia     Psoriasis     S/P TIPS (transjugular intrahepatic portosystemic shunt)     Tophaceous gout     Deep vein thrombosis (DVT) of non-extremity vein, unspecified chronicity     Moderate protein-calorie malnutrition (H)     Alcohol use, unspecified with unspecified alcohol-induced disorder (H)     Acute deep vein thrombosis (DVT) of right lower  extremity, unspecified vein (H)     Encephalopathy     Pre-operative cardiovascular examination     CKD (chronic kidney disease) stage 5, GFR less than 15 ml/min (H)     Status post coronary angiogram     Recurrent Clostridioides difficile diarrhea     Chronic atrial fibrillation (H)     Physical deconditioning     Liver transplant recipient (H)     Bacteremia due to Enterococcus     Administration of long-term prophylactic antibiotics     Immunosuppressed status (H)     Ileus, postoperative (H)     Atrial fibrillation with rapid ventricular response (H)     Delayed graft function of kidney     Hypotension     Kidney transplant recipient     Acute post-operative pain     CAD (coronary artery disease)     Severe malnutrition (H)     Steroid-induced hyperglycemia     Muscular deconditioning       Allergies   No Known Allergies    Medications   Current Outpatient Medications   Medication Sig     Alcohol Swabs PADS Use to swab the area of the injection or quyen as directed Per insurance coverage     allopurinol (ZYLOPRIM) 100 MG tablet Take 1 tablet (100 mg) by mouth daily     aspirin (ASA) 325 MG tablet 1 tablet (325 mg) by Oral or Feeding Tube route daily     atorvastatin (LIPITOR) 20 MG tablet 1 tablet (20 mg) by Oral or Feeding Tube route every evening     blood glucose (NO BRAND SPECIFIED) lancets standard To use to test glucose level in the blood Use to test blood sugar  4  times daily as directed. To accompany glucose monitor brands per insurance coverage.     blood glucose (NO BRAND SPECIFIED) test strip To use to test glucose level in the blood Use to test blood sugar  4 times daily as directed. To accompany glucose monitor brands per insurance coverage.     blood glucose monitoring (NO BRAND SPECIFIED) meter device kit Use as directed Per insurance coverage     bumetanide (BUMEX) 1 MG tablet Take 1 tablet (1 mg) by mouth 2 times daily     diclofenac (VOLTAREN) 1 % topical gel Apply 4 g topically 3 times daily      hydrocortisone (CORTAID) 1 % external cream Apply topically 2 times daily     insulin aspart (NOVOLOG PEN) 100 UNIT/ML pen Inject 1 Units Subcutaneous 3 times daily (with meals) 1 unit of insulin with 12 grams of carb with breakfast / lunch and dinner .     insulin glargine (LANTUS PEN) 100 UNIT/ML pen Inject 5 Units Subcutaneous At Bedtime     insulin pen needle (32G X 4 MM) 32G X 4 MM miscellaneous Use as directed by provider Per insurance coverage     magnesium oxide (MAG-OX) 400 MG tablet Take 2 tablets (800 mg) by mouth 2 times daily     methocarbamol (ROBAXIN) 500 MG tablet 1 tablet (500 mg) by Oral or Feeding Tube route 4 times daily as needed for muscle spasms     metoprolol tartrate (LOPRESSOR) 25 MG tablet 0.5 tablets (12.5 mg) by Oral or Feeding Tube route every 8 hours     multivitamin w/minerals (THERA-VIT-M) tablet Take 1 tablet by mouth daily     MYFORTIC (BRAND) 180 MG EC tablet Take 3 tablets (540 mg) by mouth 2 times daily     nystatin (MYCOSTATIN) 993102 UNIT/ML suspension Take 5 mLs (500,000 Units) by mouth 4 times daily for 14 days     pantoprazole (PROTONIX) 40 MG EC tablet Take 1 tablet (40 mg) by mouth every morning (before breakfast)     polyethylene glycol (MIRALAX) 17 GM/Dose powder Take 17 g by mouth daily     potassium chloride ER (KLOR-CON M) 20 MEQ CR tablet Take 2 tablets (40 mEq) by mouth 2 times daily     predniSONE (DELTASONE) 10 MG tablet Take 1 tablet (10 mg) by mouth daily     Sharps Container MISC Use as directed to dispose of needles, lancets and other sharps     sulfamethoxazole-trimethoprim (BACTRIM) 400-80 MG tablet Take 1 tablet by mouth daily     tacrolimus (GENERIC EQUIVALENT) 1 MG capsule Take 3 capsules (3 mg) by mouth 2 times daily     ursodiol (ACTIGALL) 250 MG tablet Take 1 tablet (250 mg) by mouth 2 times daily     valGANciclovir (VALCYTE) 450 MG tablet Take 1 tablet (450 mg) by mouth daily     warfarin ANTICOAGULANT (COUMADIN) 1 MG tablet Take 1 tablet (1 mg)  by mouth daily     No current facility-administered medications for this visit.     Medications Discontinued During This Encounter   Medication Reason     nystatin (MYCOSTATIN) 530732 UNIT/ML suspension      bumetanide (BUMEX) 1 MG tablet Reorder (No AVS)     metoprolol tartrate (LOPRESSOR) 25 MG tablet Reorder (No AVS)     potassium chloride ER (KLOR-CON M) 20 MEQ CR tablet Reorder (No AVS)     polyethylene glycol (MIRALAX) 17 GM/Dose powder      diclofenac (VOLTAREN) 1 % topical gel      methocarbamol (ROBAXIN) 500 MG tablet      valGANciclovir (VALCYTE) 450 MG tablet Reorder (No AVS)       Physical Exam   Vital Signs: /77   Pulse 100   Temp 98.4  F (36.9  C)   Wt 97.8 kg (215 lb 9.6 oz)   SpO2 99%   BMI 33.77 kg/m      GENERAL APPEARANCE: alert and no distress  HENT: mouth without ulcers or lesions  LYMPHATICS: no cervical or supraclavicular nodes  RESP: lungs clear to auscultation - no rales, rhonchi or wheezes  CV: regular rhythm, normal rate, no rub, no murmur  EDEMA: no LE edema bilaterally  ABDOMEN: soft, nondistended, nontender, bowel sounds normal  MS: extremities normal - no gross deformities noted, no evidence of inflammation in joints, no muscle tenderness  SKIN: no rash  NEURO: normal strength and tone, sensory exam grossly normal, mentation intact and speech normal  PSYCH: mentation appears normal and affect normal/bright  TX KIDNEY: normal  DIALYSIS ACCESS: none    Data         Latest Ref Rng & Units 7/31/2023     8:23 AM 7/27/2023     8:55 AM 7/27/2023     5:52 AM   Renal   Sodium 136 - 145 mmol/L 136      K 3.4 - 5.3 mmol/L 3.9   4.0    Cl 98 - 107 mmol/L 98      Cl (external) 98 - 107 mmol/L 98      CO2 22 - 29 mmol/L 23      Urea Nitrogen 8.0 - 23.0 mg/dL 23.5      Creatinine 0.67 - 1.17 mg/dL 1.24      Glucose 70 - 99 mg/dL 102  117     Calcium 8.8 - 10.2 mg/dL 9.4      Magnesium 1.7 - 2.3 mg/dL 1.5   1.6          Latest Ref Rng & Units 7/31/2023     8:23 AM 7/27/2023     5:52 AM  7/26/2023     5:56 AM   Bone Health   Phosphorus 2.5 - 4.5 mg/dL 2.5  2.6  2.6          Latest Ref Rng & Units 7/31/2023     8:23 AM 7/20/2023     8:34 PM 7/19/2023     5:55 AM   Heme   WBC 4.0 - 11.0 10e3/uL 6.7  8.7  7.4    Hgb 13.3 - 17.7 g/dL 7.8  7.3  7.4    Plt 150 - 450 10e3/uL 315  206  204    ABSOLUTE NEUTROPHIL 1.6 - 8.3 10e3/uL   6.4    ABSOLUTE LYMPHOCYTES 0.8 - 5.3 10e3/uL   0.1    ABSOLUTE MONOCYTES 0.0 - 1.3 10e3/uL   0.4    ABSOLUTE EOSINOPHILS 0.0 - 0.7 10e3/uL   0.1          Latest Ref Rng & Units 7/31/2023     8:23 AM 7/26/2023     5:56 AM 7/24/2023     8:01 AM   Liver   AP 40 - 129 U/L 260  219  221    TBili <=1.2 mg/dL 0.4  0.6  0.5    Bilirubin Direct 0.00 - 0.30 mg/dL <0.20      ALT 0 - 70 U/L 79  45  52    AST 0 - 45 U/L 46  24  39    Tot Protein 6.4 - 8.3 g/dL 6.3  5.6  5.6    Albumin 3.5 - 5.2 g/dL 3.5  3.0  3.0          Latest Ref Rng & Units 6/7/2023     3:30 AM 6/6/2023     9:08 PM 6/5/2023     4:05 PM   Pancreas   A1C <5.7 %  5.1     Amylase 28 - 100 U/L 90   153    Lipase (Roche) 13 - 60 U/L 59            Latest Ref Rng & Units 11/22/2022     8:48 AM   Iron studies   Iron 61 - 157 ug/dL 68    Iron Sat Index 15 - 46 % 31    Ferritin 31 - 409 ng/mL 429          Latest Ref Rng & Units 6/5/2023     4:05 PM 11/22/2022     8:48 AM   [facility] Txp Virology   EBV CAPSID ANTIBODY IGG No detectable antibody. Positive  Positive         Recent Labs   Lab Test 06/19/23  0354 06/20/23  0604 07/03/23  0708 07/05/23  0707 07/24/23  0801 07/26/23  0556 07/31/23  0823   DOSTAC 6/16/2023  --  7/2/2023  --   --   --  7/30/2023   TACROL 3.9*   < > 7.3   < > 6.6 7.4 6.1    < > = values in this interval not displayed.     Recent Labs   Lab Test 07/10/23  0613   MPACID 0.73*   MPAG 89.6       Again, thank you for allowing me to participate in the care of your patient.        Sincerely,        Subhash Dutta MD

## 2023-08-01 NOTE — PATIENT INSTRUCTIONS
"Recommendations from today's MTM visit:                                                       Move Lantus 5 units to the morning. Let us know if blood sugars drop below 80 in the morning or during the day.  [facility] mail order will call you three weeks post discharge, but if your dose changes, call the number on the back of the pill box to talk to them earlier, [phone]. If your doctor sends over a new prescription to the mail order pharmacy you will have to call them to set up the order. They have an Parth called \"[facility]\" as well.     Follow-up: 2 months    It was great speaking with you today.  I value your experience and would be very thankful for your time in providing feedback in our clinic survey. In the next few days, you may receive an email or text message from [employer] with a link to a survey related to your  clinical pharmacist.\"     To schedule another MTM appointment, please call the clinic directly or you may call the MTM scheduling line at [phone] or toll-free at [phone].     My Clinical Pharmacist's contact information:                                                      Please feel free to contact me with any questions or concerns you have.      Gallo Sharma, PharmD  MTM Pharmacist    Phone: [phone]     "

## 2023-08-01 NOTE — PROCEDURES
Transplant Surgery  Operative Note    Preop dx: Status post  Donor kidney transplant.  Post op dx: same   Procedure: Flexible cystoscopy and ureteral stent removal   Surgeon: orlando beach CNP  Assistant: iris patino RN    Anesthesia: local  EBL: 0   Specimens: none.  Findings: none abnormal. Stent inspected and noted to be intact.   Indication: The patient is status post kidney transplant and the ureteral stent is no longer needed.    Procedure: The patient was positioned supine on the table.  The groin was sterilely prepped and draped in the usual fashion. Time out was done. Urojet was applied to the urethra. A flexible cystoscope was inserted and advanced thru the urethra into the bladder. The stent was visualized and grasped. The cystoscope, grasper and stent were removed en-mass. The stent was visualized to be intact.  The bladder mucosa was normal in appearance. Antibiotics were administered. The patient tolerated the procedure well and was asked to void before leaving the clinic.

## 2023-08-01 NOTE — PROGRESS NOTES
"Medication Therapy Management (MTM) Encounter    ASSESSMENT:                            Medication Adherence/Access: No issues identified    Liver and kidney Transplant:  Stable.    Steroid induced diabetes:  Blood sugars in the morning at goal , during the day running mostly around 150-160. Recommended he dose Lantus in the morning rather than the evening to maximize levels during the day when blood sugars are the highest.     GERD: Stable.     Right Jugular Thrombus: Stable.    Afib/Hypertension/Edema:Stable.     Gout/ Arthritis: Stable. Patient is following with rheum to discuss alternatives to prednisone treatment considering diabetes and other long term negative effects from use.    Hyperlipidemia: Stable.     Nausea: Stable.     PLAN:                            Move Lantus 5 units to the morning. Let us know if blood sugars drop below 80 in the morning or during the day.  [facility] mail order will call you three weeks post discharge, but if your dose changes, call the number on the back of the pill box to talk to them earlier, [phone]. If your doctor sends over a new prescription to the mail order pharmacy you will have to call them to set up the order. They have an Parth called \"[facility]\" as well.     Follow-up: 2 months post transplant    SUBJECTIVE/OBJECTIVE:                          Casey Quinones is a 67 year old male called for a transitions of care visit. He was discharged from [facility] on [facility]/27 for liver and kidney transplant. Patient was accompanied by Apoorva.     Reason for visit: initial post transplant med review.    Allergies/ADRs: Reviewed in chart  Past Medical History: Reviewed in chart  Tobacco: He reports that he has never smoked. He has never been exposed to tobacco smoke. He has never used smokeless tobacco.  Alcohol: not currently using  THC/CBD:    Medication Adherence/Access: Patient uses pill box(es), uses reminders/alarms, and has assistance with medication administration: family " member, Apoorva.  Patient takes medications 4 time(s) per day. 8am, 12pm, 4pm 8pm  Per patient, misses medication 0 times per week.   The patient fills medications at [facility]: YES.    Liver and kidney Transplant:  Current immunosuppressants include Tacrolimus 3mg twice daily, Prednisone 10mg every morning (for gout, RA, Arthritis), and Myfortic 540mg twice daily.  Pt reports tremors  Transplant date: 6/6/23  Estimated Creatinine Clearance: 62.7 mL/min (A) (based on SCr of 1.31 mg/dL (H)).  CMV prophylaxis: CrCl >/=60 mL/minute: Valcyte 900mg daily. Treat 3 months post tx   PJP prophylaxis: Bactrim S S daily   Antifungal Prophylaxis: Dr. Dutta stopped his Nystatin today.    Other meds: Ursodiol 250mg twice daily.   Vascular prophylaxis: Aspirin 325mg daily. Denies gastrointestinal bleed sx.   Supplements: Mag Oxide 800mg twice daily  ( 2 hours from MMF), Multivitamin daily  Tx Coordinator: Liver-Cindy Clay, kidney- Kathleen Otero MD: Liver- Dr. Clifton, Dr. Poole, Kidney- Dr. Dutta, Using Med Card: Yes  Immunizations: annual flu shot 2022; Culymdzmo37:  2016; Prevnar 13: 20023; TDaP:  2021; Shingrix: unknown, HBV: no immunity, COVID: unknown  Lab Results   Component Value Date    MAG 1.4 (L) 08/01/2023    MAG 1.5 (L) 07/31/2023    MAG 1.6 (L) 07/27/2023       Steroid induced diabetes:    Lantus 5 units at bedtime.  Novolog 1 unit per 12 g of carbs before meals  Patient is not experiencing side effects.  Blood sugar monitoring: 3-4 time(s) daily; Ranges:    Current diabetes symptoms: none  Urine Albumin:   Lab Results   Component Value Date    UMALCR 24.54 (H) 07/31/2023      Lab Results   Component Value Date    A1C 5.1 06/06/2023     Date FBG/ 2hours post Lunch/2hours post Dinner /2hours post Bedtime   8/1 97 195     7/31 89 163 145    7/30 115 149 153 145   7/29 87 201 178 99   7/28 97 141 232 147     GERD:   Pantoprazole 40 mg once daily , Simethicone 250mg daily prn, helps with his gas.   Patient  reports belching and eructation.  Patient feels that current regimen is effective.    Right Jugular Thrombus:   Warfarin 1mg every evening.  INR 1.5-2 per discharge summary.    Afib/Hypertension/Edema:  warfarin 1mg daily for stroke prevention- although only takign due to DVT per patient. INR 1.5-2.   Metoprolol 12.5mg twice daily  Bumex 1mg every morning  Potassium 20mEq twice daily.   Weights 216 lbs today, down from 219 lbs.   Patient reports no current medication side effects.   Patient does have a history of GI bleed, Varices history. Circa 2016.   BP Readings from Last 3 Encounters:   08/01/23 110/77   07/27/23 107/68   07/10/23 121/81     Pulse Readings from Last 3 Encounters:   08/01/23 100   07/27/23 100   07/10/23 109     Lab Results   Component Value Date    POTASSIUM 4.3 08/01/2023    POTASSIUM 3.9 07/31/2023     Gout/ Arthritis:   Prednisone 10mg daily  Allopurinol 100mg daily  Patient reports no current pain concerns. Patient is experiencing the following medication side effects: none.   Uric Acid   Date Value Ref Range Status   06/23/2023 6.2 3.4 - 7.0 mg/dL Final     Hyperlipidemia:   Atorvastatin 20mg daily  Patient reports no significant myalgias or other side effects. Does have general aches since he is still so weak.   The 10-year ASCVD risk score (Tre JOHANSEN, et al., 2019) is: 20.6%    Values used to calculate the score:      Age: 67 years      Sex: Male      Is Non- : No      Diabetic: Yes      Tobacco smoker: No      Systolic Blood Pressure: 110 mmHg      Is BP treated: Yes      HDL Cholesterol: 79 mg/dL      Total Cholesterol: 285 mg/dL  Recent Labs   Lab Test 11/22/22  0848   CHOL 285*   HDL 79   *   TRIG 96     Nausea:   Pt started Ondansetron prn nausea.  No recent vomiting, feeling nauseous after getting up before taking medications.     Today's Vitals: There were no vitals taken for this visit.  ----------------    I spent 35 minutes with this patient today.   A copy of the visit note was provided to the patient's provider(s).    A summary of these recommendations was sent via [employer].    Shahana ShaferD  [facility] Pharmacist    Phone: [phone]     Post Discharge Medication Reconciliation Status: discharge medications reconciled and changed, per note/orders.    Telemedicine Visit Details  Type of service:  Telephone visit  Start Time: 2 PM  End Time:  2:35 PM     Medication Therapy Recommendations  Steroid-induced hyperglycemia    Current Medication: insulin glargine (LANTUS PEN) 100 UNIT/ML pen   Rationale: Incorrect administration - Dosage too low - Effectiveness   Recommendation: Change Administration Time   Status: Patient Agreed - Adherence/Education

## 2023-08-01 NOTE — NURSING NOTE
Cystoscopy - urethral stent removal    Prepped patient for procedure with no complications.   2% lidocaine gel injected into urethra via urojet.   Assisted Lorna Naik CNP with stent removal.  Administered 500mg Levaquin PO after procedure.  Patient was discharged in stable condition.       No Known Allergies      BIANCA DAVIS RN on 8/1/2023 at 11:22 AM

## 2023-08-01 NOTE — PATIENT INSTRUCTIONS
Patient Recommendations:  - Stop nystatin  -Decrease bumex to 1mg daily in AM  -Decrease metoprolol to 12.5mg twice daily   -Decrease potassium to 20meq twice daily   -Increase valganciclovir to 900mg daily   -Start ondansetron (zofran) as needed for nausea    Transplant Patient Information  Your Post Transplant Coordinator is: Angelita Torres  For non urgent items, we encourage you to contact your coordinator/care team online via [employer]  You and your care team can also contact your transplant coordinator Monday - Friday, 8am - 5pm at [phone] (Option 2 to reach the coordinator or Option 4 to schedule an appointment).  After hours for urgent matters, please call [facility] at [phone].

## 2023-08-02 ENCOUNTER — TELEPHONE (OUTPATIENT)
Dept: TRANSPLANT | Facility: CLINIC | Age: 68
End: 2023-08-02
Payer: COMMERCIAL

## 2023-08-02 ENCOUNTER — TELEPHONE (OUTPATIENT)
Dept: NEPHROLOGY | Facility: CLINIC | Age: 68
End: 2023-08-02
Payer: COMMERCIAL

## 2023-08-02 ENCOUNTER — PATIENT OUTREACH (OUTPATIENT)
Dept: CARE COORDINATION | Facility: CLINIC | Age: 68
End: 2023-08-02
Payer: COMMERCIAL

## 2023-08-02 DIAGNOSIS — M1A.9XX1 TOPHACEOUS GOUT: Primary | ICD-10-CM

## 2023-08-02 DIAGNOSIS — N18.2 ANEMIA IN STAGE 2 CHRONIC KIDNEY DISEASE: ICD-10-CM

## 2023-08-02 DIAGNOSIS — D63.1 ANEMIA IN STAGE 2 CHRONIC KIDNEY DISEASE: ICD-10-CM

## 2023-08-02 DIAGNOSIS — Z94.0 KIDNEY REPLACED BY TRANSPLANT: Primary | ICD-10-CM

## 2023-08-02 DIAGNOSIS — Z94.4 LIVER TRANSPLANT RECIPIENT (H): Primary | ICD-10-CM

## 2023-08-02 DIAGNOSIS — D63.8 ANEMIA IN OTHER CHRONIC DISEASES CLASSIFIED ELSEWHERE: ICD-10-CM

## 2023-08-02 LAB
BKV DNA # SPEC NAA+PROBE: NOT DETECTED COPIES/ML
LABORATORY REPORT: NORMAL
MYCOPHENOLATE SERPL LC/MS/MS-MCNC: 0.82 MG/L (ref 1–3.5)
MYCOPHENOLATE-G SERPL LC/MS/MS-MCNC: 65.7 MG/L (ref 30–95)
PLPETH BLD-MCNC: <10 NG/ML
POPETH BLD-MCNC: <10 NG/ML
TME LAST DOSE: ABNORMAL H
TME LAST DOSE: ABNORMAL H

## 2023-08-02 RX ORDER — ALBUTEROL SULFATE 0.83 MG/ML
2.5 SOLUTION RESPIRATORY (INHALATION)
Status: CANCELLED | OUTPATIENT
Start: 2023-08-02

## 2023-08-02 RX ORDER — EPINEPHRINE 1 MG/ML
0.3 INJECTION, SOLUTION, CONCENTRATE INTRAVENOUS EVERY 5 MIN PRN
Status: CANCELLED | OUTPATIENT
Start: 2023-08-02

## 2023-08-02 RX ORDER — METHYLPREDNISOLONE SODIUM SUCCINATE 125 MG/2ML
125 INJECTION, POWDER, LYOPHILIZED, FOR SOLUTION INTRAMUSCULAR; INTRAVENOUS
Status: CANCELLED
Start: 2023-08-02

## 2023-08-02 RX ORDER — DIPHENHYDRAMINE HYDROCHLORIDE 50 MG/ML
50 INJECTION INTRAMUSCULAR; INTRAVENOUS
Status: CANCELLED
Start: 2023-08-02

## 2023-08-02 RX ORDER — MYCOPHENOLIC ACID 180 MG/1
720 TABLET, DELAYED RELEASE ORAL 2 TIMES DAILY
Qty: 240 TABLET | Refills: 3 | Status: SHIPPED | OUTPATIENT
Start: 2023-08-02 | End: 2023-08-29

## 2023-08-02 RX ORDER — ALBUTEROL SULFATE 90 UG/1
1-2 AEROSOL, METERED RESPIRATORY (INHALATION)
Status: CANCELLED
Start: 2023-08-02

## 2023-08-02 RX ORDER — MEPERIDINE HYDROCHLORIDE 25 MG/ML
25 INJECTION INTRAMUSCULAR; INTRAVENOUS; SUBCUTANEOUS EVERY 30 MIN PRN
Status: CANCELLED | OUTPATIENT
Start: 2023-08-02

## 2023-08-02 NOTE — TELEPHONE ENCOUNTER
Subhash Dutta MD Huepfel, Mary K, RN  Increase MPA to 720mg bid        Called wife Apoorva and Casey increase Myfortic mycophenolic acid  740 mg twice per day   They verbalized understanding  - Sent to preferred pharmacy

## 2023-08-02 NOTE — PROGRESS NOTES
Anemia Management Note - Enrollment  SUBJECTIVE/OBJECTIVE:    Referred by Dr. Subhash Dutta on 2023  Primary Diagnosis: Anemia of Other Chronic Illness (D63.8)     Secondary Diagnosis:  Organ or tissue replaced by transplant, kidney (Z94.0)  Kidney Tx: 2023  Hgb goal range:  9-10  Epo/Darbo: Aranesp  40 mcg  every two weeks for Hgb <10.0. In clinic  *If Aranesp is ordered dose at 0.45mcg/kg  Iron regimen:  NA  Labs : 2024  Recent FEDERICO use, transfusion, IV iron: Mircera   RX/TX plans : 2024    *[facility]; [phone]     Hx of DVT (2023), Afib, anticoagulate.     Consent to Communicate:  OK to speak with Nabila Quinones (wife) per consent to communicate dated 10/27/2022. No Boxes Checked on Consent to Communicate.         Latest Ref Rng & Units 2023     6:06 AM 2023     6:20 AM 2023     5:40 AM 2023     5:55 AM 2023     8:34 PM 2023     8:23 AM 2023     9:24 AM   Anemia   Hemoglobin 13.3 - 17.7 g/dL 7.6  7.9  7.4  7.4  7.3  7.8     Ferritin 31 - 409 ng/mL       506        BP Readings from Last 3 Encounters:   23 110/77   23 107/68   07/10/23 121/81     Wt Readings from Last 2 Encounters:   23 97.8 kg (215 lb 9.6 oz)   23 99.7 kg (219 lb 14.4 oz)     Current Outpatient Medications   Medication Sig Dispense Refill    Alcohol Swabs PADS Use to swab the area of the injection or quyen as directed Per insurance coverage 100 each 0    allopurinol (ZYLOPRIM) 100 MG tablet Take 1 tablet (100 mg) by mouth daily 30 tablet 0    aspirin (ASA) 325 MG tablet 1 tablet (325 mg) by Oral or Feeding Tube route daily 30 tablet 0    atorvastatin (LIPITOR) 20 MG tablet 1 tablet (20 mg) by Oral or Feeding Tube route every evening 30 tablet 0    blood glucose (NO BRAND SPECIFIED) lancets standard To use to test glucose level in the blood Use to test blood sugar  4  times daily as directed. To accompany glucose monitor brands per insurance  coverage. 100 each 0    blood glucose (NO BRAND SPECIFIED) test strip To use to test glucose level in the blood Use to test blood sugar  4 times daily as directed. To accompany glucose monitor brands per insurance coverage. 4 strip 1    blood glucose monitoring (NO BRAND SPECIFIED) meter device kit Use as directed Per insurance coverage 1 kit 0    bumetanide (BUMEX) 1 MG tablet Take 1 tablet (1 mg) by mouth daily 60 tablet 0    hydrocortisone (CORTAID) 1 % external cream Apply topically 2 times daily 20 g 0    insulin aspart (NOVOLOG PEN) 100 UNIT/ML pen Inject 1 Units Subcutaneous 3 times daily (with meals) 1 unit of insulin with 12 grams of carb with breakfast / lunch and dinner . 15 mL 0    insulin glargine (LANTUS PEN) 100 UNIT/ML pen Inject 5 Units Subcutaneous At Bedtime 15 mL 0    insulin pen needle (32G X 4 MM) 32G X 4 MM miscellaneous Use as directed by provider Per insurance coverage 100 each 0    magnesium oxide (MAG-OX) 400 MG tablet Take 2 tablets (800 mg) by mouth 2 times daily 60 tablet 0    metoprolol tartrate (LOPRESSOR) 25 MG tablet Take 0.5 tablets (12.5 mg) by mouth 2 times daily 60 tablet 0    multivitamin w/minerals (THERA-VIT-M) tablet Take 1 tablet by mouth daily 30 tablet 0    MYFORTIC (BRAND) 180 MG EC tablet Take 3 tablets (540 mg) by mouth 2 times daily 60 tablet 0    ondansetron (ZOFRAN) 4 MG tablet Take 1 tablet (4 mg) by mouth every 8 hours as needed for nausea 60 tablet 1    pantoprazole (PROTONIX) 40 MG EC tablet Take 1 tablet (40 mg) by mouth every morning (before breakfast) 30 tablet 0    potassium chloride ER (KLOR-CON M) 20 MEQ CR tablet Take 1 tablet (20 mEq) by mouth 2 times daily 60 tablet 0    predniSONE (DELTASONE) 10 MG tablet Take 1 tablet (10 mg) by mouth daily 30 tablet 0    Sharps Container MISC Use as directed to dispose of needles, lancets and other sharps 1 each 0    simethicone (MYLICON) 125 MG chewable tablet Take 250 mg by mouth 2 times daily       sulfamethoxazole-trimethoprim (BACTRIM) 400-80 MG tablet Take 1 tablet by mouth daily 30 tablet 0    tacrolimus (GENERIC EQUIVALENT) 1 MG capsule Take 3 capsules (3 mg) by mouth every morning AND 4 capsules (4 mg) every evening. 630 capsule 3    ursodiol (ACTIGALL) 250 MG tablet Take 1 tablet (250 mg) by mouth 2 times daily 60 tablet 0    valGANciclovir (VALCYTE) 450 MG tablet Take 2 tablets (900 mg) by mouth daily for 90 days 180 tablet 0    warfarin ANTICOAGULANT (COUMADIN) 1 MG tablet Take 1 tablet (1 mg) by mouth daily 30 tablet 0     ASSESSMENT:  Hgb Not at goal/Initiation of therapy   Ferritin: At goal (>100ng/mL)  TSat: not at goal (>30%) but ferritin >500ng/mL.  IV iron not indicated at this time per anemia protocol.  Iron regimen recommended: [facility]  Recommended FEDERICO regimen: Aranesp 40mcg every 14 days   Blood Pressure: Stable    PLAN:  1. Spoke with Apoorva today for enrollment in Anemia Management Service.  2. Discussed:  anemia overview, monitoring service, and goal hemoglobin range and rationale and risks of FEDERICO blood clots, stroke, and increase in blood pressure  3. Dose location: in clinic [facility]  4. Labs: Vera   5. Pharmacy: [facility]    Spoke with Apoorva.   Prefers to go to [facility] for aranesp vs dosing Retacrit at home.  Orders placed for Aranesp 40mcg every 14 days.   Apoorva was in an appt and needs to call me back for the rest of the info.     Update: Apoorva returned call.  She will call the [facility] to get Casey scheduled for Aranesp    Next call date:  8/8/23    Molly Fenton RN   Anemia Services  [facility]  [email or web address]   Office : [phone]  Fax: [phone]

## 2023-08-02 NOTE — TELEPHONE ENCOUNTER
plan  Received: Today  Subhash Dutta MD Huepfel, Angelita GRAVES RN  With next labs please get UPCR, refer to anemia services. He sees rheum tmrw at [facility]. I gave the patient's wife my pager number to call me to discuss given that he is still on pred 10, continues to have gout symptoms. I want to decrease his pred down to 5mg daily in the coming weeks if possible.

## 2023-08-03 ENCOUNTER — THERAPY VISIT (OUTPATIENT)
Dept: PHYSICAL THERAPY | Facility: REHABILITATION | Age: 68
End: 2023-08-03
Attending: INTERNAL MEDICINE
Payer: COMMERCIAL

## 2023-08-03 ENCOUNTER — MYC REFILL (OUTPATIENT)
Dept: TRANSPLANT | Facility: CLINIC | Age: 68
End: 2023-08-03

## 2023-08-03 DIAGNOSIS — Z74.09 IMPAIRED FUNCTIONAL MOBILITY, BALANCE, GAIT, AND ENDURANCE: ICD-10-CM

## 2023-08-03 DIAGNOSIS — Z94.0 KIDNEY TRANSPLANT RECIPIENT: ICD-10-CM

## 2023-08-03 DIAGNOSIS — Z94.0 KIDNEY TRANSPLANTED: Primary | ICD-10-CM

## 2023-08-03 DIAGNOSIS — Z94.4 LIVER TRANSPLANT RECIPIENT (H): Primary | ICD-10-CM

## 2023-08-03 LAB
DONOR IDENTIFICATION: NORMAL
DONOR IDENTIFICATION: NORMAL
DSA COMMENTS: NORMAL
DSA COMMENTS: NORMAL
DSA PRESENT: NO
DSA PRESENT: NO
DSA TEST METHOD: NORMAL
DSA TEST METHOD: NORMAL
HBV DNA SERPL QL NAA+PROBE: NORMAL
HCV RNA SERPL QL NAA+PROBE: NORMAL
HIV1+2 RNA SERPL QL NAA+PROBE: NORMAL
INT SUB RESULT: NORMAL
INT SUB RESULT: NORMAL
INTERF SUBSTANCE: NORMAL
INTERF SUBSTANCE: NORMAL
INTSUB TEST METHOD: NORMAL
INTSUB TEST METHOD: NORMAL
ORGAN: NORMAL
ORGAN: NORMAL
SA 1 CELL: NORMAL
SA 1 CELL: NORMAL
SA 1 TEST METHOD: NORMAL
SA 1 TEST METHOD: NORMAL
SA 2 CELL: NORMAL
SA 2 CELL: NORMAL
SA 2 TEST METHOD: NORMAL
SA 2 TEST METHOD: NORMAL
SA1 HI RISK ABY: NORMAL
SA1 HI RISK ABY: NORMAL
SA1 MOD RISK ABY: NORMAL
SA1 MOD RISK ABY: NORMAL
SA2 HI RISK ABY: NORMAL
SA2 HI RISK ABY: NORMAL
SA2 MOD RISK ABY: NORMAL
SA2 MOD RISK ABY: NORMAL
UNACCEPTABLE ANTIGENS: NORMAL
UNACCEPTABLE ANTIGENS: NORMAL
ZZZINT SUB COMMENTS: NORMAL
ZZZINT SUB COMMENTS: NORMAL
ZZZSA 1  COMMENTS: NORMAL
ZZZSA 1  COMMENTS: NORMAL
ZZZSA 2 COMMENTS: NORMAL
ZZZSA 2 COMMENTS: NORMAL

## 2023-08-03 PROCEDURE — 97116 GAIT TRAINING THERAPY: CPT | Mod: GP

## 2023-08-03 PROCEDURE — 97750 PHYSICAL PERFORMANCE TEST: CPT | Mod: GP

## 2023-08-03 RX ORDER — VALGANCICLOVIR 450 MG/1
900 TABLET, FILM COATED ORAL DAILY
Qty: 180 TABLET | Refills: 0 | Status: SHIPPED | OUTPATIENT
Start: 2023-08-03 | End: 2023-08-17

## 2023-08-04 ENCOUNTER — TELEPHONE (OUTPATIENT)
Dept: TRANSPLANT | Facility: CLINIC | Age: 68
End: 2023-08-04

## 2023-08-04 ENCOUNTER — LAB (OUTPATIENT)
Dept: LAB | Facility: CLINIC | Age: 68
End: 2023-08-04
Payer: COMMERCIAL

## 2023-08-04 ENCOUNTER — ANTICOAGULATION THERAPY VISIT (OUTPATIENT)
Dept: ANTICOAGULATION | Facility: CLINIC | Age: 68
End: 2023-08-04

## 2023-08-04 DIAGNOSIS — I48.20 CHRONIC ATRIAL FIBRILLATION (H): Primary | ICD-10-CM

## 2023-08-04 DIAGNOSIS — Z94.4 LIVER REPLACED BY TRANSPLANT (H): ICD-10-CM

## 2023-08-04 DIAGNOSIS — M1A.9XX1 TOPHACEOUS GOUT: ICD-10-CM

## 2023-08-04 DIAGNOSIS — Z94.4 LIVER TRANSPLANT RECIPIENT (H): ICD-10-CM

## 2023-08-04 DIAGNOSIS — I48.20 CHRONIC ATRIAL FIBRILLATION (H): ICD-10-CM

## 2023-08-04 DIAGNOSIS — N18.30 CHRONIC KIDNEY DISEASE, STAGE 3 UNSPECIFIED (H): ICD-10-CM

## 2023-08-04 DIAGNOSIS — Z94.0 KIDNEY REPLACED BY TRANSPLANT: ICD-10-CM

## 2023-08-04 LAB
ALBUMIN MFR UR ELPH: 18.8 MG/DL
ALBUMIN SERPL BCG-MCNC: 3.3 G/DL (ref 3.5–5.2)
ALBUMIN UR-MCNC: NEGATIVE MG/DL
ALP SERPL-CCNC: 216 U/L (ref 40–129)
ALT SERPL W P-5'-P-CCNC: 50 U/L (ref 0–70)
ANION GAP SERPL CALCULATED.3IONS-SCNC: 13 MMOL/L (ref 7–15)
APPEARANCE UR: CLEAR
AST SERPL W P-5'-P-CCNC: 42 U/L (ref 0–45)
BACTERIA #/AREA URNS HPF: ABNORMAL /HPF
BILIRUB DIRECT SERPL-MCNC: 0.2 MG/DL (ref 0–0.3)
BILIRUB SERPL-MCNC: 0.4 MG/DL
BILIRUB UR QL STRIP: NEGATIVE
BUN SERPL-MCNC: 23.1 MG/DL (ref 8–23)
CALCIUM SERPL-MCNC: 9.1 MG/DL (ref 8.8–10.2)
CHLORIDE SERPL-SCNC: 100 MMOL/L (ref 98–107)
COLOR UR AUTO: YELLOW
CREAT SERPL-MCNC: 1.25 MG/DL (ref 0.67–1.17)
CREAT UR-MCNC: 96.7 MG/DL
DEPRECATED HCO3 PLAS-SCNC: 23 MMOL/L (ref 22–29)
ERYTHROCYTE [DISTWIDTH] IN BLOOD BY AUTOMATED COUNT: 17.4 % (ref 10–15)
GFR SERPL CREATININE-BSD FRML MDRD: 64 ML/MIN/1.73M2
GGT SERPL-CCNC: 236 U/L (ref 8–61)
GLUCOSE SERPL-MCNC: 103 MG/DL (ref 70–99)
GLUCOSE UR STRIP-MCNC: NEGATIVE MG/DL
HCT VFR BLD AUTO: 25.1 % (ref 40–53)
HGB BLD-MCNC: 7.5 G/DL (ref 13.3–17.7)
HGB UR QL STRIP: NEGATIVE
INR PPP: 1.47 (ref 0.85–1.15)
KETONES UR STRIP-MCNC: NEGATIVE MG/DL
LEUKOCYTE ESTERASE UR QL STRIP: ABNORMAL
MAGNESIUM SERPL-MCNC: 1.5 MG/DL (ref 1.7–2.3)
MCH RBC QN AUTO: 28.5 PG (ref 26.5–33)
MCHC RBC AUTO-ENTMCNC: 29.9 G/DL (ref 31.5–36.5)
MCV RBC AUTO: 95 FL (ref 78–100)
NITRATE UR QL: NEGATIVE
PH UR STRIP: 6.5 [PH] (ref 5–7)
PHOSPHATE SERPL-MCNC: 2.5 MG/DL (ref 2.5–4.5)
PLATELET # BLD AUTO: 327 10E3/UL (ref 150–450)
POTASSIUM SERPL-SCNC: 4.1 MMOL/L (ref 3.4–5.3)
PROT SERPL-MCNC: 5.9 G/DL (ref 6.4–8.3)
PROT/CREAT 24H UR: 0.19 MG/MG CR (ref 0–0.2)
RBC # BLD AUTO: 2.63 10E6/UL (ref 4.4–5.9)
RBC #/AREA URNS AUTO: ABNORMAL /HPF
SODIUM SERPL-SCNC: 136 MMOL/L (ref 136–145)
SP GR UR STRIP: 1.01 (ref 1–1.03)
SQUAMOUS #/AREA URNS AUTO: ABNORMAL /LPF
TACROLIMUS BLD-MCNC: 11.7 UG/L (ref 5–15)
TME LAST DOSE: NORMAL H
TME LAST DOSE: NORMAL H
TRANS CELLS #/AREA URNS HPF: ABNORMAL /HPF
URATE SERPL-MCNC: 8 MG/DL (ref 3.4–7)
UROBILINOGEN UR STRIP-ACNC: 0.2 E.U./DL
WBC # BLD AUTO: 5.1 10E3/UL (ref 4–11)
WBC #/AREA URNS AUTO: ABNORMAL /HPF

## 2023-08-04 PROCEDURE — 81001 URINALYSIS AUTO W/SCOPE: CPT

## 2023-08-04 PROCEDURE — 83516 IMMUNOASSAY NONANTIBODY: CPT

## 2023-08-04 PROCEDURE — 82977 ASSAY OF GGT: CPT

## 2023-08-04 PROCEDURE — 83735 ASSAY OF MAGNESIUM: CPT

## 2023-08-04 PROCEDURE — 85027 COMPLETE CBC AUTOMATED: CPT

## 2023-08-04 PROCEDURE — 84100 ASSAY OF PHOSPHORUS: CPT

## 2023-08-04 PROCEDURE — 86037 ANCA TITER EACH ANTIBODY: CPT

## 2023-08-04 PROCEDURE — 36415 COLL VENOUS BLD VENIPUNCTURE: CPT

## 2023-08-04 PROCEDURE — 80053 COMPREHEN METABOLIC PANEL: CPT

## 2023-08-04 PROCEDURE — 84550 ASSAY OF BLOOD/URIC ACID: CPT

## 2023-08-04 PROCEDURE — 80197 ASSAY OF TACROLIMUS: CPT

## 2023-08-04 PROCEDURE — 86036 ANCA SCREEN EACH ANTIBODY: CPT

## 2023-08-04 PROCEDURE — 82248 BILIRUBIN DIRECT: CPT

## 2023-08-04 PROCEDURE — 85610 PROTHROMBIN TIME: CPT | Mod: QW

## 2023-08-04 PROCEDURE — 84156 ASSAY OF PROTEIN URINE: CPT

## 2023-08-04 PROCEDURE — 83876 ASSAY MYELOPEROXIDASE: CPT

## 2023-08-04 RX ORDER — MAGNESIUM OXIDE 400 MG/1
800 TABLET ORAL 2 TIMES DAILY
Qty: 60 TABLET | Refills: 0 | Status: SHIPPED | OUTPATIENT
Start: 2023-08-04 | End: 2023-08-16

## 2023-08-04 NOTE — PROGRESS NOTES
ANTICOAGULATION MANAGEMENT     Damion Quinones 67 year old male is on warfarin with subtherapeutic INR result. (Goal INR 1.5-2.0)    Recent labs: (last 7 days)     08/04/23  0712   INR 1.47*       ASSESSMENT     Source(s): Chart Review and Patient/Caregiver Call     Warfarin doses taken: Warfarin taken as instructed  Diet: No new diet changes identified  Medication/supplement changes:  Stop Nystatin, decrease Bumex, decrease Metoprolol, decrease Potassium, increase Valganciclovir, Start zofran prn  New illness, injury, or hospitalization: No, continues recover from transplant  Signs or symptoms of bleeding or clotting: No. Hgb down to 7.5 today; provider notified  Previous result: Therapeutic last visit; previously outside of goal range  Additional findings: None       PLAN     Recommended plan for ongoing change(s) affecting INR     Dosing Instructions: Continue your current warfarin dose with next INR in 3-5 days       Summary  As of 8/4/2023      Full warfarin instructions:  1 mg every day   Next INR check:  8/7/2023               Telephone call with wife, Apoorva who verbalizes understanding and agrees to plan    Lab visit scheduled    Education provided:   Goal range and lab monitoring: goal range and significance of current result  Symptom monitoring: monitoring for bleeding signs and symptoms and monitoring for clotting signs and symptoms  Contact [phone] with any changes, questions or concerns.     Plan made with [facility] Pharmacist Jalyn Naylor, LORIN  Anticoagulation Clinic  8/4/2023    _______________________________________________________________________     Anticoagulation Episode Summary       Current INR goal:  1.5-2.0   TTR:  36.1 % (4 wk)   Target end date:  5/3/2024   Send INR reminders to:  [facility] [place]    Indications    Chronic atrial fibrillation (H) [I48.20]  Liver replaced by transplant (H) [Z94.4]             Comments:               Anticoagulation Care Providers        Provider Role Specialty Phone number    Gary Serrano MD Referring Family Medicine [phone]    Lashay Rachel MD Referring Internal Medicine [phone]

## 2023-08-04 NOTE — TELEPHONE ENCOUNTER
DATE:  8/4/2023     TIME OF RECEIPT FROM LAB:  8:00    ORDERING PROVIDER: Chad Clifton    LAB TEST:  hgb    LAB VALUE:  7.5    RESULTS GIVEN WITH READ-BACK TO (PROVIDER):  Dorcas Lamb    TIME LAB VALUE REPORTED TO PROVIDER:   8:23

## 2023-08-07 ENCOUNTER — ANTICOAGULATION THERAPY VISIT (OUTPATIENT)
Dept: ANTICOAGULATION | Facility: CLINIC | Age: 68
End: 2023-08-07

## 2023-08-07 ENCOUNTER — OFFICE VISIT (OUTPATIENT)
Dept: TRANSPLANT | Facility: CLINIC | Age: 68
End: 2023-08-07
Attending: TRANSPLANT SURGERY
Payer: COMMERCIAL

## 2023-08-07 ENCOUNTER — TELEPHONE (OUTPATIENT)
Dept: TRANSPLANT | Facility: CLINIC | Age: 68
End: 2023-08-07

## 2023-08-07 ENCOUNTER — LAB (OUTPATIENT)
Dept: LAB | Facility: CLINIC | Age: 68
End: 2023-08-07
Payer: COMMERCIAL

## 2023-08-07 ENCOUNTER — INFUSION THERAPY VISIT (OUTPATIENT)
Dept: INFUSION THERAPY | Facility: CLINIC | Age: 68
End: 2023-08-07
Attending: TRANSPLANT SURGERY
Payer: COMMERCIAL

## 2023-08-07 VITALS
HEART RATE: 111 BPM | SYSTOLIC BLOOD PRESSURE: 94 MMHG | DIASTOLIC BLOOD PRESSURE: 66 MMHG | OXYGEN SATURATION: 99 % | TEMPERATURE: 98.5 F

## 2023-08-07 VITALS
HEART RATE: 106 BPM | OXYGEN SATURATION: 98 % | SYSTOLIC BLOOD PRESSURE: 107 MMHG | RESPIRATION RATE: 22 BRPM | TEMPERATURE: 98.3 F | DIASTOLIC BLOOD PRESSURE: 72 MMHG

## 2023-08-07 DIAGNOSIS — Z94.4 LIVER REPLACED BY TRANSPLANT (H): Primary | ICD-10-CM

## 2023-08-07 DIAGNOSIS — Z94.0 KIDNEY REPLACED BY TRANSPLANT: ICD-10-CM

## 2023-08-07 DIAGNOSIS — N18.2 ANEMIA IN STAGE 2 CHRONIC KIDNEY DISEASE: ICD-10-CM

## 2023-08-07 DIAGNOSIS — I48.20 CHRONIC ATRIAL FIBRILLATION (H): Primary | ICD-10-CM

## 2023-08-07 DIAGNOSIS — D63.1 ANEMIA IN STAGE 2 CHRONIC KIDNEY DISEASE: ICD-10-CM

## 2023-08-07 DIAGNOSIS — Z94.4 LIVER REPLACED BY TRANSPLANT (H): ICD-10-CM

## 2023-08-07 DIAGNOSIS — Z94.4 LIVER TRANSPLANT RECIPIENT (H): ICD-10-CM

## 2023-08-07 DIAGNOSIS — I48.20 CHRONIC ATRIAL FIBRILLATION (H): ICD-10-CM

## 2023-08-07 LAB
ALBUMIN SERPL BCG-MCNC: 3.4 G/DL (ref 3.5–5.2)
ALP SERPL-CCNC: 200 U/L (ref 40–129)
ALT SERPL W P-5'-P-CCNC: 45 U/L (ref 0–70)
ANCA AB PATTERN SER IF-IMP: NORMAL
ANION GAP SERPL CALCULATED.3IONS-SCNC: 13 MMOL/L (ref 7–15)
AST SERPL W P-5'-P-CCNC: 32 U/L (ref 0–45)
BILIRUB DIRECT SERPL-MCNC: 0.25 MG/DL (ref 0–0.3)
BILIRUB SERPL-MCNC: 0.5 MG/DL
BUN SERPL-MCNC: 24.2 MG/DL (ref 8–23)
C-ANCA TITR SER IF: NORMAL {TITER}
CALCIUM SERPL-MCNC: 9.1 MG/DL (ref 8.8–10.2)
CHLORIDE SERPL-SCNC: 100 MMOL/L (ref 98–107)
CREAT SERPL-MCNC: 1.21 MG/DL (ref 0.67–1.17)
DEPRECATED HCO3 PLAS-SCNC: 23 MMOL/L (ref 22–29)
ERYTHROCYTE [DISTWIDTH] IN BLOOD BY AUTOMATED COUNT: 17.5 % (ref 10–15)
FERRITIN SERPL-MCNC: 440 NG/ML (ref 31–409)
GFR SERPL CREATININE-BSD FRML MDRD: 66 ML/MIN/1.73M2
GLUCOSE SERPL-MCNC: 117 MG/DL (ref 70–99)
HCT VFR BLD AUTO: 25.3 % (ref 40–53)
HGB BLD-MCNC: 7.4 G/DL (ref 13.3–17.7)
INR PPP: 1.56 (ref 0.85–1.15)
IRON BINDING CAPACITY (ROCHE): 244 UG/DL (ref 240–430)
IRON SATN MFR SERPL: 11 % (ref 15–46)
IRON SERPL-MCNC: 28 UG/DL (ref 61–157)
MAGNESIUM SERPL-MCNC: 1.4 MG/DL (ref 1.7–2.3)
MCH RBC QN AUTO: 27.7 PG (ref 26.5–33)
MCHC RBC AUTO-ENTMCNC: 29.2 G/DL (ref 31.5–36.5)
MCV RBC AUTO: 95 FL (ref 78–100)
PHOSPHATE SERPL-MCNC: 2.5 MG/DL (ref 2.5–4.5)
PLATELET # BLD AUTO: 359 10E3/UL (ref 150–450)
POTASSIUM SERPL-SCNC: 4.3 MMOL/L (ref 3.4–5.3)
PROT SERPL-MCNC: 5.9 G/DL (ref 6.4–8.3)
RBC # BLD AUTO: 2.67 10E6/UL (ref 4.4–5.9)
SODIUM SERPL-SCNC: 136 MMOL/L (ref 136–145)
TACROLIMUS BLD-MCNC: 11.9 UG/L (ref 5–15)
TME LAST DOSE: NORMAL H
TME LAST DOSE: NORMAL H
WBC # BLD AUTO: 5.3 10E3/UL (ref 4–11)

## 2023-08-07 PROCEDURE — 84100 ASSAY OF PHOSPHORUS: CPT

## 2023-08-07 PROCEDURE — 83540 ASSAY OF IRON: CPT

## 2023-08-07 PROCEDURE — 99213 OFFICE O/P EST LOW 20 MIN: CPT | Mod: 25 | Performed by: TRANSPLANT SURGERY

## 2023-08-07 PROCEDURE — 82248 BILIRUBIN DIRECT: CPT

## 2023-08-07 PROCEDURE — 80053 COMPREHEN METABOLIC PANEL: CPT

## 2023-08-07 PROCEDURE — 83735 ASSAY OF MAGNESIUM: CPT

## 2023-08-07 PROCEDURE — 96365 THER/PROPH/DIAG IV INF INIT: CPT

## 2023-08-07 PROCEDURE — 80197 ASSAY OF TACROLIMUS: CPT

## 2023-08-07 PROCEDURE — 85610 PROTHROMBIN TIME: CPT | Mod: QW

## 2023-08-07 PROCEDURE — 85027 COMPLETE CBC AUTOMATED: CPT

## 2023-08-07 PROCEDURE — 82728 ASSAY OF FERRITIN: CPT

## 2023-08-07 PROCEDURE — 83550 IRON BINDING TEST: CPT

## 2023-08-07 PROCEDURE — G0463 HOSPITAL OUTPT CLINIC VISIT: HCPCS | Mod: 25 | Performed by: TRANSPLANT SURGERY

## 2023-08-07 PROCEDURE — 250N000011 HC RX IP 250 OP 636: Mod: JW | Performed by: TRANSPLANT SURGERY

## 2023-08-07 PROCEDURE — 999N000128 HC STATISTIC PERIPHERAL IV START W/O US GUIDANCE

## 2023-08-07 PROCEDURE — 80180 DRUG SCRN QUAN MYCOPHENOLATE: CPT

## 2023-08-07 PROCEDURE — 99213 OFFICE O/P EST LOW 20 MIN: CPT | Mod: 24 | Performed by: TRANSPLANT SURGERY

## 2023-08-07 PROCEDURE — 36415 COLL VENOUS BLD VENIPUNCTURE: CPT

## 2023-08-07 RX ORDER — DEXTROSE MONOHYDRATE, SODIUM CHLORIDE, AND POTASSIUM CHLORIDE 50; .745; 4.5 G/1000ML; G/1000ML; G/1000ML
INJECTION, SOLUTION INTRAVENOUS CONTINUOUS
Status: DISCONTINUED | OUTPATIENT
Start: 2023-08-07 | End: 2023-08-07

## 2023-08-07 RX ADMIN — POTASSIUM CHLORIDE: 2 INJECTION, SOLUTION, CONCENTRATE INTRAVENOUS at 11:17

## 2023-08-07 ASSESSMENT — PAIN SCALES - GENERAL: PAINLEVEL: NO PAIN (0)

## 2023-08-07 NOTE — PROGRESS NOTES
HPI      ROS      Physical Exam    Transplant Surgery -OUTPATIENT IMMUNOSUPPRESSION PROGRESS NOTE    Date of Visit: 08/07/2023    Transplants:  6/6/2023 (Liver), 6/6/2023 (Kidney); Postoperative day:  62 (Liver), 62 (Kidney)  ASSESMENT AND PLAN:  1.Graft Function:Liver allograft: no rejection or technical problems.  Kidney allograft: no rejection or technical problems;     2.Immunosuppression Management:keep tacrolimus levels around 8 ng/dL  3.Hypertension: ok  4.Renal Function:dehydrated  5.Lab frequency:weekly  6.Other:  I liter NS     Date: August 7, 2023    Transplant:  [x]                             Liver []                              Kidney []                             Pancreas []                              Other:             Chief Complaint:  Feels weak and dehydrated    History of Present Illness:  Patient Active Problem List   Diagnosis    Psoriatic arthritis (H)    PAF (paroxysmal atrial fibrillation) (H)    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)    HTN, kidney transplant related    Hereditary hemochromatosis (H)    Other hyperlipidemia    Tophaceous gout    Deep vein thrombosis (DVT) of non-extremity vein, unspecified chronicity    CKD (chronic kidney disease), stage II    Chronic atrial fibrillation (H)    Liver replaced by transplant (H)    Immunosuppressed status (H)    Kidney replaced by transplant    CAD (coronary artery disease)    Steroid-induced hyperglycemia    Muscular deconditioning    Aftercare following organ transplant    Anemia in other chronic diseases classified elsewhere     SOCIAL /FAMILY HISTORY: [x]                  No recent change    Past Medical History:   Diagnosis Date    Alcoholic cirrhosis of liver with ascites (H) 10/11/2019    ANCA-associated vasculitis (H) 2022    C. difficile colitis     Coronary artery disease     [facility] 4/2023 - complete occlusion of RCA    ESRD (end stage renal disease) on dialysis (H)     Gout      History of hemochromatosis 10/11/2019    Hypertension     IgA nephropathy     Obesity     PAF (paroxysmal atrial fibrillation) (H)     Pre-diabetes     Psoriatic arthritis (H)     Psoriatic arthritis (H)     RA (rheumatoid arthritis) (H)     Status post kidney transplant 2023    Induction with thymoglobulin 4mg/kg, + DSA CW9    Status post liver transplantation (H) 2023     Past Surgical History:   Procedure Laterality Date    APPENDECTOMY      Removed at 18 Years Old     BENCH KIDNEY  2023    Procedure: Bench kidney;  Surgeon: Chad Clifton MD;  Location:  OR    BENCH LIVER  2023    Procedure: Bench liver;  Surgeon: Chad Clifton MD;  Location:  OR    COLONOSCOPY       at [facility]     CV CORONARY ANGIOGRAM N/A 2023    Procedure: Coronary Angiogram;  Surgeon: Pablo Araujo MD;  Location:  HEART CARDIAC CATH LAB    EYE SURGERY Bilateral     Cataract    H STATISTIC PICC LINE INSERTION >5YR, FAILED Left 2023    Unable to advance catheter over the axillary area    HERNIA REPAIR      History of bilateral inguinal hernia repair: 10/28/2014. Open hernia repair: 10/2017. Abdominal wound exploration and debridement 2017    INSERT SHUNT PORTAL TRANSJUGULAR INTRAHEPTIC  2022    IR CVC NON TUNNEL LINE REMOVAL  7/3/2023    IR CVC NON TUNNEL PLACEMENT > 5 YRS  2023    IR CVC TUNNEL PLACEMENT > 5 YRS OF AGE  2023    IR PICC PLACEMENT > 5 YRS OF AGE  2023    IR RENAL BIOPSY RIGHT  2023    RETURN LIVER TRANSPLANT N/A 2023    Procedure: Return liver transplant. Intra-operative ultrasound;  Surgeon: Chad Clifton MD;  Location: [facility] OR    TRANSPLANT KIDNEY RECIPIENT  DONOR N/A 2023    Procedure: Transplant kidney recipient  donor, ureteral stent placement;  Surgeon: Chad Clifton MD;  Location: [facility] OR    TRANSPLANT LIVER RECIPIENT  DONOR N/A 2023     Procedure: Transplant liver recipient  donor;  Surgeon: Chad Clifton MD;  Location:  OR     Social History     Socioeconomic History    Marital status:      Spouse name: Not on file    Number of children: Not on file    Years of education: Not on file    Highest education level: Not on file   Occupational History    Not on file   Tobacco Use    Smoking status: Never     Passive exposure: Never    Smokeless tobacco: Never   Vaping Use    Vaping Use: Never used   Substance and Sexual Activity    Alcohol use: Not Currently     Comment: Last ETOH use was 2021    Drug use: Not Currently    Sexual activity: Not on file   Other Topics Concern    Parent/sibling w/ CABG, MI or angioplasty before 65F 55M? Not Asked   Social History Narrative    Not on file     Social Determinants of Health     Financial Resource Strain: Not on file   Food Insecurity: Not on file   Transportation Needs: Not on file   Physical Activity: Not on file   Stress: Not on file   Social Connections: Not on file   Intimate Partner Violence: Not on file   Housing Stability: Not on file     Prescription Medications as of 2023         Rx Number Disp Refills Start End Last Dispensed Date Next Fill Date Owning Pharmacy    Alcohol Swabs PADS  100 each 0 2023    [place] [address]    Sig: Use to swab the area of the injection or quyen as directed Per insurance coverage    Class: E-Prescribe    allopurinol (ZYLOPRIM) 100 MG tablet  30 tablet 0 2023    [place] [address]    Sig: Take 1 tablet (100 mg) by mouth daily    Class: E-Prescribe    Route: Oral    aspirin (ASA) 325 MG tablet  30 tablet 0 2023    [place] [address]    Si tablet (325 mg) by Oral or Feeding Tube route daily    Class: E-Prescribe    Route: Oral or Feeding Tube    atorvastatin (LIPITOR) 20 MG tablet  30  tablet 0 2023    [place] [address] [address]    Si tablet (20 mg) by Oral or Feeding Tube route every evening    Class: E-Prescribe    Route: Oral or Feeding Tube    blood glucose (NO BRAND SPECIFIED) lancets standard  100 each 0 2023    [place] [address] [address]    Sig: To use to test glucose level in the blood Use to test blood sugar  4  times daily as directed. To accompany glucose monitor brands per insurance coverage.    Class: E-Prescribe    blood glucose (NO BRAND SPECIFIED) test strip  4 strip 1 2023    [id number] - [place] [address]    Sig: To use to test glucose level in the blood Use to test blood sugar  4 times daily as directed. To accompany glucose monitor brands per insurance coverage.    Class: E-Prescribe    blood glucose monitoring (NO BRAND SPECIFIED) meter device kit  1 kit 0 2023    [facility] [address]    Sig: Use as directed Per insurance coverage    Class: E-Prescribe    bumetanide (BUMEX) 1 MG tablet  60 tablet 0 2023        Sig: Take 1 tablet (1 mg) by mouth daily    Class: Historical    Route: Oral    hydrocortisone (CORTAID) 1 % external cream  20 g 0 2023    [place] [address]    Sig: Apply topically 2 times daily    Class: E-Prescribe    Route: Topical    insulin aspart (NOVOLOG PEN) 100 UNIT/ML pen  15 mL 0 2023    [facility] [address]    Sig: Inject 1 Units Subcutaneous 3 times daily (with meals) 1 unit of insulin with 12 grams of carb with breakfast / lunch and dinner .    Class: E-Prescribe    Notes to Pharmacy: 1 unit with 12 grams of carbohydrate    Route: Subcutaneous    Renewals       Renewal requests to authorizing provider (Lashay Rachel MD) <b>prohibited</b>            insulin glargine  (LANTUS PEN) 100 UNIT/ML pen  15 mL 0 7/25/2023    [place], [place] - [address]    Sig: Inject 5 Units Subcutaneous At Bedtime    Class: E-Prescribe    Notes to Pharmacy: If Lantus is not covered by insurance, may substitute Basaglar or Semglee or other insulin glargine product per insurance preference at same dose and frequency.      Route: Subcutaneous    insulin pen needle (32G X 4 MM) 32G X 4 MM miscellaneous  100 each 0 7/25/2023    [place], [place] - [address]    Sig: Use as directed by provider Per insurance coverage    Class: E-Prescribe    magnesium oxide (MAG-OX) 400 MG tablet  60 tablet 0 8/4/2023    [id number] [place], [place] - [address] [facility] [address]    Sig: Take 2 tablets (800 mg) by mouth 2 times daily    Class: E-Prescribe    Route: Oral    metoprolol tartrate (LOPRESSOR) 25 MG tablet  60 tablet 0 8/1/2023        Sig: Take 0.5 tablets (12.5 mg) by mouth 2 times daily    Class: Historical    Route: Oral    multivitamin w/minerals (THERA-VIT-M) tablet  30 tablet 0 7/26/2023    [place], [place] - [address]    Sig: Take 1 tablet by mouth daily    Class: E-Prescribe    Route: Oral    MYFORTIC (BRAND) 180 MG EC tablet  240 tablet 3 8/2/2023    [facility] - [place], [place] - [address]    Sig: Take 4 tablets (720 mg) by mouth 2 times daily    Class: E-Prescribe    Notes to Pharmacy: TXP DT 6/6/2023 (Liver), 6/6/2023 (Kidney) TXP Dischg DT 6/25/2023 DX Kidney replaced by transplant Z94.0 [facility] ([place], [place]) [facility]    Route: Oral    ondansetron (ZOFRAN) 4 MG tablet  60 tablet 1 8/1/2023 8/31/2023   [place], [place] - [address] [place] [address] [address]    Sig: Take 1 tablet (4 mg) by mouth every 8 hours as needed for nausea    Class: E-Prescribe     Route: Oral    pantoprazole (PROTONIX) 40 MG EC tablet  30 tablet 0 7/26/2023    [facility] [address]    Sig: Take 1 tablet (40 mg) by mouth every morning (before breakfast)    Class: E-Prescribe    Route: Oral    potassium chloride ER (KLOR-CON M) 20 MEQ CR tablet  60 tablet 0 8/1/2023        Sig: Take 1 tablet (20 mEq) by mouth 2 times daily    Class: Historical    Route: Oral    predniSONE (DELTASONE) 10 MG tablet  30 tablet 0 7/26/2023    [facility] [address]    Sig: Take 1 tablet (10 mg) by mouth daily    Class: E-Prescribe    Route: Oral    Sharps Container MISC  1 each 0 7/25/2023    [facility] [address]    Sig: Use as directed to dispose of needles, lancets and other sharps    Class: E-Prescribe    simethicone (MYLICON) 125 MG chewable tablet            Sig: Take 250 mg by mouth 2 times daily    Class: Historical    Route: Oral    sulfamethoxazole-trimethoprim (BACTRIM) 400-80 MG tablet  30 tablet 0 7/26/2023    [facility] [address]    Sig: Take 1 tablet by mouth daily    Class: E-Prescribe    Route: Oral    tacrolimus (GENERIC EQUIVALENT) 1 MG capsule  630 capsule 3 8/1/2023    [facility] [place], [place] - [address]    Sig: Take 3 capsules (3 mg) by mouth every morning AND 4 capsules (4 mg) every evening.    Class: E-Prescribe    Notes to Pharmacy: TXP DT 6/6/2023 (Liver), 6/6/2023 (Kidney) TXP Dischg DT 6/25/2023 DX Kidney replaced by transplant Z94.0 TX Center [facility] ([place], [place])    Route: Oral    ursodiol (ACTIGALL) 250 MG tablet  60 tablet 0 7/25/2023    [facility] [address]    Sig: Take 1 tablet (250 mg) by mouth 2 times daily    Class: E-Prescribe    Route: Oral    valGANciclovir (VALCYTE) 450 MG tablet  180 tablet 0  8/3/2023    [facility] - [place], [place] - [address]    Sig: Take 2 tablets (900 mg) by mouth daily    Class: E-Prescribe    Route: Oral    warfarin ANTICOAGULANT (COUMADIN) 1 MG tablet  30 tablet 0 7/25/2023    [facility] [place], [place] - [address]    Sig: Take 1 tablet (1 mg) by mouth daily    Class: E-Prescribe    Route: Oral          Patient has no known allergies.   REVIEW OF SYSTEMS (check box if normal)  [x]               GENERAL  [x]                 PULMONARY [x]                GENITOURINARY  [x]                CNS                 [x]                 CARDIAC  [x]                 ENDOCRINE  [x]                EARS,NOSE,THROAT [x]                 GASTROINTESTINAL [x]                 NEUROLOGIC    [x]                MUSCLOSKELTAL  [x]                  HEMATOLOGY      PHYSICAL EXAM (check box if normal)BP 94/66   Pulse 111   Temp 98.5  F (36.9  C) (Oral)   SpO2 99%         [x]            GENERAL:    [x]       EYES:  ICTERIC   []        YES  []                    NO  [x]           EXTREMITIES: Clubbing []       Y     [x]           N    [x]           EARS, NOSE, THROAT: Membranes Moist    YES   [x]                   NO []                  [x]           LUNGS:  CLEAR    YES       [x]                  NO    []                                [x]           SKIN: Jaundice           YES       []                  NO    [x]                   Rash: YES       []                  NO    [x]                                     [x]             HEART: Regular Rate          YES       [x]                  NO    []                   Incision Clean:  YES       [x]                  NO    []                                [x]                    ABDOMEN: Organomegaly YES       []                  NO    [x]                       [x]                    NEUROLOGICAL:  Nonfocal  YES       [x]                  NO    []                       [x]                    Hernia YES       []                   NO    [x]                   PSYCHIATRIC:  Appropriate  YES       [x]                  NO    []                       OTHER:                                                                                                   PAIN SCALE:: 3

## 2023-08-07 NOTE — PROGRESS NOTES
Infusion Nursing Note:  Damion Quinones presents today for IVF.    Patient seen by provider today: Yes: Dr. Clifton   present during visit today: Not Applicable.    Note:   D5 1/2NS with Kcl infused over 1 hour.    Intravenous Access:  Peripheral IV [facility] by [facility].    Treatment Conditions:  Not Applicable.    Administrations This Visit       dextrose 5% and 0.45% NaCl 1,000 mL with potassium chloride 10 mEq *See DOSE to administer in MAR details (order question)       Admin Date  08/07/2023 Action  $New Bag Dose   Rate  1,055 mL/hr Route  Intravenous Administered By  Shanique Wilkerson, LORIN                  /72 (BP Location: Right arm, Patient Position: Sitting, Cuff Size: Adult Large)   Pulse 106   Temp 98.3  F (36.8  C) (Oral)   Resp 22   SpO2 98%     Post Infusion Assessment:  Patient tolerated infusion without incident.  Blood return noted pre and post infusion.  Site patent and intact, free from redness, edema or discomfort.  No evidence of extravasations.  Access discontinued per protocol.       Discharge Plan:   Discharge instructions reviewed with: Patient.  Patient and/or family verbalized understanding of discharge instructions and all questions answered.  AVS to patient via [employer]   Patient discharged in stable condition accompanied by: wife.  Departure Mode: Wheelchair.      Shanique Wilkerson RN

## 2023-08-07 NOTE — TELEPHONE ENCOUNTER
[facility] Call Center    Phone Message    May a detailed message be left on voicemail: yes     Reason for Call: Appointment Intake    Patient's wife calling in to schedule 2 week follow-up with Dr. Clifton - please put in request for appt with labs prior.  Thank you!        Action Taken: Message routed to:  [facility] FRED WILDE    Travel Screening: Not Applicable

## 2023-08-07 NOTE — PROGRESS NOTES
Danielle is scheduled for 8/17/23.     Molly Fenton RN   Anemia Services  [facility]  [email or web address]   Office : [phone]  Fax: [phone]

## 2023-08-07 NOTE — PROGRESS NOTES
ANTICOAGULATION MANAGEMENT     Damion Quinones 67 year old male is on warfarin with therapeutic INR result. (Goal INR 1.5-2.0)    Recent labs: (last 7 days)     08/07/23  0726   INR 1.56*       ASSESSMENT     Warfarin Lab Questionnaire    Warfarin Doses Last 7 Days      8/7/2023     7:10 AM   Dose in Tablet or Mg   TAB or MG? milligram (mg)     Pt Rptd Dose SUNDAY MONDAY TUESDAY WED THURS FRIDAY SATURDAY 8/7/2023   7:10 AM 1 1 1 1 1 1 1         8/7/2023   Warfarin Lab Questionnaire   Missed doses within past 14 days? No   Changes in diet or alcohol within past 14 days? No   Medication changes since last result? No   Injuries or illness since last result? No   New shortness of breath, severe headaches or sudden changes in vision since last result? No   Abnormal bleeding since last result? No   Upcoming surgery, procedure? No   Best number to call with results? [phone]     Previous result: Subtherapeutic  Additional findings: None       PLAN     Recommended plan for no diet, medication or health factor changes affecting INR     Dosing Instructions: Continue your current warfarin dose with next INR in 1 week       Summary  As of 8/7/2023      Full warfarin instructions:  1 mg every day   Next INR check:  8/14/2023               Telephone call with wife, Apoorva who verbalizes understanding and agrees to plan    Lab visit scheduled    Education provided:   Goal range and lab monitoring: goal range and significance of current result  Contact [phone] with any changes, questions or concerns.     Plan made per [facility] anticoagulation protocol    Alejandra Naylor RN  Anticoagulation Clinic  8/7/2023    _______________________________________________________________________     Anticoagulation Episode Summary       Current INR goal:  1.5-2.0   TTR:  39.0 % (1 mo)   Target end date:  5/3/2024   Send INR reminders to:  [facility] [place]    Indications    Chronic atrial fibrillation (H) [I48.20]  Liver replaced by transplant  (H) [Z94.4]             Comments:               Anticoagulation Care Providers       Provider Role Specialty Phone number    Gary Serrano MD Referring Family Medicine [phone]    Lashay Rachel MD Referring Internal Medicine [phone]

## 2023-08-07 NOTE — TELEPHONE ENCOUNTER
DATE:  8/7/2023     TIME OF RECEIPT FROM LAB:  8:02    ORDERING PROVIDER: karli    LAB TEST:  hgb    LAB VALUE:  7.4    RESULTS GIVEN WITH READ-BACK TO (PROVIDER):  Cindy Clay    TIME LAB VALUE REPORTED TO PROVIDER:   8:15

## 2023-08-07 NOTE — TELEPHONE ENCOUNTER
Dr. Clifton and Dr. Dutta are aware of ongoing low hemoglobins.  At this time, will be watching hemoglobin. [facility] will be following up with anemia services.

## 2023-08-07 NOTE — LETTER
8/7/2023         RE: Damion Quinones  [address] [facility] [address]        Dear Colleague,    Thank you for referring your patient, Damion Quinones, to the [facility] TRANSPLANT [facility]INIC. Please see a copy of my visit note below.      Transplant Surgery -OUTPATIENT IMMUNOSUPPRESSION PROGRESS NOTE    Date of Visit: 08/07/2023    Transplants:  6/6/2023 (Liver), 6/6/2023 (Kidney); Postoperative day:  62 (Liver), 62 (Kidney)  ASSESMENT AND PLAN:  1.Graft Function:Liver allograft: no rejection or technical problems.  Kidney allograft: no rejection or technical problems;     2.Immunosuppression Management:keep tacrolimus levels around 8 ng/dL  3.Hypertension: ok  4.Renal Function:dehydrated  5.Lab frequency:weekly  6.Other:  I liter NS     Date: August 7, 2023    Transplant:  [x]                             Liver []                              Kidney []                             Pancreas []                              Other:             Chief Complaint:  Feels weak and dehydrated    History of Present Illness:  Patient Active Problem List   Diagnosis    Psoriatic arthritis (H)    PAF (paroxysmal atrial fibrillation) (H)    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)    HTN, kidney transplant related    Hereditary hemochromatosis (H)    Other hyperlipidemia    Tophaceous gout    Deep vein thrombosis (DVT) of non-extremity vein, unspecified chronicity    CKD (chronic kidney disease), stage II    Chronic atrial fibrillation (H)    Liver replaced by transplant (H)    Immunosuppressed status (H)    Kidney replaced by transplant    CAD (coronary artery disease)    Steroid-induced hyperglycemia    Muscular deconditioning    Aftercare following organ transplant    Anemia in other chronic diseases classified elsewhere     SOCIAL /FAMILY HISTORY: [x]                  No recent change    Past Medical History:   Diagnosis Date    Alcoholic cirrhosis of liver with  ascites (H) 10/11/2019    ANCA-associated vasculitis (H)     C. difficile colitis     Coronary artery disease     [facility] 2023 - complete occlusion of RCA    ESRD (end stage renal disease) on dialysis (H)     Gout     History of hemochromatosis 10/11/2019    Hypertension     IgA nephropathy     Obesity     PAF (paroxysmal atrial fibrillation) (H)     Pre-diabetes     Psoriatic arthritis (H)     Psoriatic arthritis (H)     RA (rheumatoid arthritis) (H)     Status post kidney transplant 2023    Induction with thymoglobulin 4mg/kg, + DSA CW9    Status post liver transplantation (H) 2023     Past Surgical History:   Procedure Laterality Date    APPENDECTOMY      Removed at 18 Years Old     [facility] KIDNEY  2023    Procedure: [facility] kidney;  Surgeon: Chad Clifton MD;  Location:  OR    [facility] LIVER  2023    Procedure: [facility] liver;  Surgeon: Chad Clifton MD;  Location:  OR    COLONOSCOPY      2014 at [facility]     CV CORONARY ANGIOGRAM N/A 2023    Procedure: Coronary Angiogram;  Surgeon: Pablo Araujo MD;  Location:  HEART CARDIAC CATH LAB    EYE SURGERY Bilateral     Cataract    H STATISTIC PICC LINE INSERTION >5YR, FAILED Left 2023    Unable to advance catheter over the axillary area    HERNIA REPAIR      History of bilateral inguinal hernia repair: 10/28/2014. Open hernia repair: 10/2017. Abdominal wound exploration and debridement 2017    INSERT SHUNT PORTAL TRANSJUGULAR INTRAHEPTIC  2022    IR CVC NON TUNNEL LINE REMOVAL  7/3/2023    IR CVC NON TUNNEL PLACEMENT > 5 YRS  2023    IR CVC TUNNEL PLACEMENT > 5 YRS OF AGE  2023    IR PICC PLACEMENT > 5 YRS OF AGE  2023    IR RENAL BIOPSY RIGHT  2023    RETURN LIVER TRANSPLANT N/A 2023    Procedure: Return liver transplant. Intra-operative ultrasound;  Surgeon: Chad Clifton MD;  Location:  OR    TRANSPLANT KIDNEY RECIPIENT  DONOR N/A  2023    Procedure: Transplant kidney recipient  donor, ureteral stent placement;  Surgeon: Chad Clifton MD;  Location: [facility] OR    TRANSPLANT LIVER RECIPIENT  DONOR N/A 2023    Procedure: Transplant liver recipient  donor;  Surgeon: Chad Clifton MD;  Location: [facility] OR     Social History     Socioeconomic History    Marital status:      Spouse name: Not on file    Number of children: Not on file    Years of education: Not on file    Highest education level: Not on file   Occupational History    Not on file   Tobacco Use    Smoking status: Never     Passive exposure: Never    Smokeless tobacco: Never   Vaping Use    Vaping Use: Never used   Substance and Sexual Activity    Alcohol use: Not Currently     Comment: Last ETOH use was 2021    Drug use: Not Currently    Sexual activity: Not on file   Other Topics Concern    Parent/sibling w/ CABG, MI or angioplasty before 65F 55M? Not Asked   Social History Narrative    Not on file     Social Determinants of Health     Financial Resource Strain: Not on file   Food Insecurity: Not on file   Transportation Needs: Not on file   Physical Activity: Not on file   Stress: Not on file   Social Connections: Not on file   Intimate Partner Violence: Not on file   Housing Stability: Not on file     Prescription Medications as of 2023         Rx Number Disp Refills Start End Last Dispensed Date Next Fill Date Owning Pharmacy    Alcohol Swabs PADS  100 each 0 2023    [facility] [address]    Sig: Use to swab the area of the injection or quyen as directed Per insurance coverage    Class: E-Prescribe    allopurinol (ZYLOPRIM) 100 MG tablet  30 tablet 0 2023    [facility] [address]    Sig: Take 1 tablet (100 mg) by mouth daily    Class: E-Prescribe    Route: Oral    aspirin (ASA) 325 MG tablet  30 tablet 0 2023    [facility] [place] [id number]    Si tablet (325 mg) by Oral or Feeding Tube route daily    Class: E-Prescribe    Route: Oral or Feeding Tube    atorvastatin (LIPITOR) 20 MG tablet  30 tablet 0 2023    [place] [address]    Si tablet (20 mg) by Oral or Feeding Tube route every evening    Class: E-Prescribe    Route: Oral or Feeding Tube    blood glucose (NO BRAND SPECIFIED) lancets standard  100 each 0 2023    [place] [address]    Sig: To use to test glucose level in the blood Use to test blood sugar  4  times daily as directed. To accompany glucose monitor brands per insurance coverage.    Class: E-Prescribe    blood glucose (NO BRAND SPECIFIED) test strip  4 strip 1 2023    [id number] - [facility] [address]    Sig: To use to test glucose level in the blood Use to test blood sugar  4 times daily as directed. To accompany glucose monitor brands per insurance coverage.    Class: E-Prescribe    blood glucose monitoring (NO BRAND SPECIFIED) meter device kit  1 kit 0 2023    [place] [address]    Sig: Use as directed Per insurance coverage    Class: E-Prescribe    bumetanide (BUMEX) 1 MG tablet  60 tablet 0 2023        Sig: Take 1 tablet (1 mg) by mouth daily    Class: Historical    Route: Oral    hydrocortisone (CORTAID) 1 % external cream  20 g 0 2023    [place] [address]  [address]    Sig: Apply topically 2 times daily    Class: E-Prescribe    Route: Topical    insulin aspart (NOVOLOG PEN) 100 UNIT/ML pen  15 mL 0 2023    [place] [address]  [address]    Sig: Inject 1 Units Subcutaneous 3 times daily (with meals) 1 unit of insulin with 12 grams of carb with breakfast / lunch and dinner .    Class:  E-Prescribe    Notes to Pharmacy: 1 unit with 12 grams of carbohydrate    Route: Subcutaneous    Renewals       Renewal requests to authorizing provider (Lashay Rachel MD) <b>prohibited</b>            insulin glargine (LANTUS PEN) 100 UNIT/ML pen  15 mL 0 7/25/2023    [facility] [address]    Sig: Inject 5 Units Subcutaneous At Bedtime    Class: E-Prescribe    Notes to Pharmacy: If Lantus is not covered by insurance, may substitute Basaglar or Semglee or other insulin glargine product per insurance preference at same dose and frequency.      Route: Subcutaneous    insulin pen needle (32G X 4 MM) 32G X 4 MM miscellaneous  100 each 0 7/25/2023    [facility] [address]    Sig: Use as directed by provider Per insurance coverage    Class: E-Prescribe    magnesium oxide (MAG-OX) 400 MG tablet  60 tablet 0 8/4/2023    [id number] [place] [address] AT [facility] [address]    Sig: Take 2 tablets (800 mg) by mouth 2 times daily    Class: E-Prescribe    Route: Oral    metoprolol tartrate (LOPRESSOR) 25 MG tablet  60 tablet 0 8/1/2023        Sig: Take 0.5 tablets (12.5 mg) by mouth 2 times daily    Class: Historical    Route: Oral    multivitamin w/minerals (THERA-VIT-M) tablet  30 tablet 0 7/26/2023    [facility] [address]    Sig: Take 1 tablet by mouth daily    Class: E-Prescribe    Route: Oral    MYFORTIC (BRAND) 180 MG EC tablet  240 tablet 3 8/2/2023    [facility] - [place], [place] - [address]    Sig: Take 4 tablets (720 mg) by mouth 2 times daily    Class: E-Prescribe    Notes to Pharmacy: TXP DT 6/6/2023 (Liver), 6/6/2023 (Kidney) TXP Dischg DT 6/25/2023 DX Kidney replaced by transplant Z94.0 [facility] Center [facility] ([place], [place]) [facility]    Route: Oral    ondansetron (ZOFRAN) 4 MG  tablet  60 tablet 1 8/1/2023 8/31/2023   [place], [place] - [address] [facility] [phone]    Sig: Take 1 tablet (4 mg) by mouth every 8 hours as needed for nausea    Class: E-Prescribe    Route: Oral    pantoprazole (PROTONIX) 40 MG EC tablet  30 tablet 0 7/26/2023    [facility] [address]    Sig: Take 1 tablet (40 mg) by mouth every morning (before breakfast)    Class: E-Prescribe    Route: Oral    potassium chloride ER (KLOR-CON M) 20 MEQ CR tablet  60 tablet 0 8/1/2023        Sig: Take 1 tablet (20 mEq) by mouth 2 times daily    Class: Historical    Route: Oral    predniSONE (DELTASONE) 10 MG tablet  30 tablet 0 7/26/2023    [facility] [address]    Sig: Take 1 tablet (10 mg) by mouth daily    Class: E-Prescribe    Route: Oral    Sharps Container MISC  1 each 0 7/25/2023    [facility] [address]    Sig: Use as directed to dispose of needles, lancets and other sharps    Class: E-Prescribe    simethicone (MYLICON) 125 MG chewable tablet            Sig: Take 250 mg by mouth 2 times daily    Class: Historical    Route: Oral    sulfamethoxazole-trimethoprim (BACTRIM) 400-80 MG tablet  30 tablet 0 7/26/2023    [facility] [address]    Sig: Take 1 tablet by mouth daily    Class: E-Prescribe    Route: Oral    tacrolimus (GENERIC EQUIVALENT) 1 MG capsule  630 capsule 3 8/1/2023    [facility] [place], [place] - [address]    Sig: Take 3 capsules (3 mg) by mouth every morning AND 4 capsules (4 mg) every evening.    Class: E-Prescribe    Notes to Pharmacy: TXP DT 6/6/2023 (Liver), 6/6/2023 (Kidney) TXP Dischg DT 6/25/2023 DX Kidney replaced by transplant Z94.0 TX Center [facility] ([place], [place])    Route: Oral    ursodiol (ACTIGALL) 250 MG tablet  60 tablet 0  7/25/2023    [place], [place] - [address]    Sig: Take 1 tablet (250 mg) by mouth 2 times daily    Class: E-Prescribe    Route: Oral    valGANciclovir (VALCYTE) 450 MG tablet  180 tablet 0 8/3/2023    [facility] [place], [place] - [address]    Sig: Take 2 tablets (900 mg) by mouth daily    Class: E-Prescribe    Route: Oral    warfarin ANTICOAGULANT (COUMADIN) 1 MG tablet  30 tablet 0 7/25/2023    [place], [place] - [address]    Sig: Take 1 tablet (1 mg) by mouth daily    Class: E-Prescribe    Route: Oral          Patient has no known allergies.   REVIEW OF SYSTEMS (check box if normal)  [x]               GENERAL  [x]                 PULMONARY [x]                GENITOURINARY  [x]                CNS                 [x]                 CARDIAC  [x]                 ENDOCRINE  [x]                EARS,NOSE,THROAT [x]                 GASTROINTESTINAL [x]                 NEUROLOGIC    [x]                MUSCLOSKELTAL  [x]                  HEMATOLOGY      PHYSICAL EXAM (check box if normal)BP 94/66   Pulse 111   Temp 98.5  F (36.9  C) (Oral)   SpO2 99%         [x]            GENERAL:    [x]       EYES:  ICTERIC   []        YES  []                    NO  [x]           EXTREMITIES: Clubbing []       Y     [x]           N    [x]           EARS, NOSE, THROAT: Membranes Moist    YES   [x]                   NO []                  [x]           LUNGS:  CLEAR    YES       [x]                  NO    []                                [x]           SKIN: Jaundice           YES       []                  NO    [x]                   Rash: YES       []                  NO    [x]                                     [x]             HEART: Regular Rate          YES       [x]                  NO    []                   Incision Clean:  YES       [x]                  NO    []                                [x]                     ABDOMEN: Organomegaly YES       []                  NO    [x]                       [x]                    NEUROLOGICAL:  Nonfocal  YES       [x]                  NO    []                       [x]                    Hernia YES       []                  NO    [x]                   PSYCHIATRIC:  Appropriate  YES       [x]                  NO    []                       OTHER:                                                                                                   PAIN SCALE:: 3       Again, thank you for allowing me to participate in the care of your patient.        Sincerely,        Chad Clifton MD

## 2023-08-08 ENCOUNTER — TELEPHONE (OUTPATIENT)
Dept: TRANSPLANT | Facility: CLINIC | Age: 68
End: 2023-08-08
Payer: COMMERCIAL

## 2023-08-08 DIAGNOSIS — Z94.4 LIVER TRANSPLANT RECIPIENT (H): ICD-10-CM

## 2023-08-08 DIAGNOSIS — Z94.0 KIDNEY REPLACED BY TRANSPLANT: ICD-10-CM

## 2023-08-08 LAB
MYCOPHENOLATE SERPL LC/MS/MS-MCNC: 1.32 MG/L (ref 1–3.5)
MYCOPHENOLATE-G SERPL LC/MS/MS-MCNC: 91.7 MG/L (ref 30–95)
MYELOPEROXIDASE AB SER IA-ACNC: <0.3 U/ML
MYELOPEROXIDASE AB SER IA-ACNC: NEGATIVE
PROTEINASE3 AB SER IA-ACNC: <1 U/ML
PROTEINASE3 AB SER IA-ACNC: NEGATIVE
TME LAST DOSE: NORMAL H
TME LAST DOSE: NORMAL H

## 2023-08-08 RX ORDER — TACROLIMUS 1 MG/1
3 CAPSULE ORAL 2 TIMES DAILY
Qty: 540 CAPSULE | Refills: 3 | Status: SHIPPED | OUTPATIENT
Start: 2023-08-08 | End: 2023-08-11

## 2023-08-08 NOTE — TELEPHONE ENCOUNTER
ISSUE:   Tacrolimus IR level 11.9 on 8/7, goal 8, dose 3 mg AM, 4 mg PM.    PLAN:   Please call patient and confirm this was an accurate 12-hour trough. Verify Tacrolimus IR dose 3 mg AM, 4 mg PM. Confirm no new medications or illness. Confirm no missed doses. If accurate trough and accurate dose, decrease Tacrolimus IR dose to 3 mg BID and repeat labs as scheduled.    Left message with Apoorva regarding dose change and to call back to verify new dose. Apoorva called back and confirmed change.    OUTCOME:   Spoke with patient and wife, they confirm accurate trough level and current dose 3 mg AM, 4 mg PM. Patient confirmed dose change to 3 mg BID and to repeat labs as scheduled. Orders sent to preferred pharmacy for dose change and lab for repeat labs. Patient voiced understanding of plan.

## 2023-08-10 ENCOUNTER — THERAPY VISIT (OUTPATIENT)
Dept: PHYSICAL THERAPY | Facility: REHABILITATION | Age: 68
End: 2023-08-10
Attending: INTERNAL MEDICINE
Payer: COMMERCIAL

## 2023-08-10 ENCOUNTER — LAB (OUTPATIENT)
Dept: LAB | Facility: CLINIC | Age: 68
End: 2023-08-10
Payer: COMMERCIAL

## 2023-08-10 ENCOUNTER — ANTICOAGULATION THERAPY VISIT (OUTPATIENT)
Dept: ANTICOAGULATION | Facility: CLINIC | Age: 68
End: 2023-08-10

## 2023-08-10 ENCOUNTER — DOCUMENTATION ONLY (OUTPATIENT)
Dept: TRANSPLANT | Facility: CLINIC | Age: 68
End: 2023-08-10

## 2023-08-10 DIAGNOSIS — Z74.09 IMPAIRED FUNCTIONAL MOBILITY, BALANCE, GAIT, AND ENDURANCE: ICD-10-CM

## 2023-08-10 DIAGNOSIS — Z94.4 LIVER REPLACED BY TRANSPLANT (H): ICD-10-CM

## 2023-08-10 DIAGNOSIS — I48.20 CHRONIC ATRIAL FIBRILLATION (H): Primary | ICD-10-CM

## 2023-08-10 DIAGNOSIS — I48.20 CHRONIC ATRIAL FIBRILLATION (H): ICD-10-CM

## 2023-08-10 DIAGNOSIS — Z94.4 LIVER TRANSPLANT RECIPIENT (H): Primary | ICD-10-CM

## 2023-08-10 DIAGNOSIS — Z94.0 KIDNEY TRANSPLANT RECIPIENT: ICD-10-CM

## 2023-08-10 LAB
ALBUMIN SERPL BCG-MCNC: 3.2 G/DL (ref 3.5–5.2)
ALP SERPL-CCNC: 199 U/L (ref 40–129)
ALT SERPL W P-5'-P-CCNC: 48 U/L (ref 0–70)
ANION GAP SERPL CALCULATED.3IONS-SCNC: 14 MMOL/L (ref 7–15)
AST SERPL W P-5'-P-CCNC: 32 U/L (ref 0–45)
BILIRUB DIRECT SERPL-MCNC: <0.2 MG/DL (ref 0–0.3)
BILIRUB SERPL-MCNC: 0.3 MG/DL
BUN SERPL-MCNC: 18.3 MG/DL (ref 8–23)
CALCIUM SERPL-MCNC: 9.1 MG/DL (ref 8.8–10.2)
CHLORIDE SERPL-SCNC: 103 MMOL/L (ref 98–107)
CREAT SERPL-MCNC: 1.05 MG/DL (ref 0.67–1.17)
DEPRECATED HCO3 PLAS-SCNC: 22 MMOL/L (ref 22–29)
ERYTHROCYTE [DISTWIDTH] IN BLOOD BY AUTOMATED COUNT: 17.4 % (ref 10–15)
GFR SERPL CREATININE-BSD FRML MDRD: 79 ML/MIN/1.73M2
GLUCOSE SERPL-MCNC: 112 MG/DL (ref 70–99)
HCT VFR BLD AUTO: 25.6 % (ref 40–53)
HGB BLD-MCNC: 7.4 G/DL (ref 13.3–17.7)
INR PPP: 1.44 (ref 0.85–1.15)
MAGNESIUM SERPL-MCNC: 1.6 MG/DL (ref 1.7–2.3)
MCH RBC QN AUTO: 27.4 PG (ref 26.5–33)
MCHC RBC AUTO-ENTMCNC: 28.9 G/DL (ref 31.5–36.5)
MCV RBC AUTO: 95 FL (ref 78–100)
PLATELET # BLD AUTO: 372 10E3/UL (ref 150–450)
POTASSIUM SERPL-SCNC: 4.3 MMOL/L (ref 3.4–5.3)
PROT SERPL-MCNC: 5.6 G/DL (ref 6.4–8.3)
RBC # BLD AUTO: 2.7 10E6/UL (ref 4.4–5.9)
SODIUM SERPL-SCNC: 139 MMOL/L (ref 136–145)
TACROLIMUS BLD-MCNC: 9.6 UG/L (ref 5–15)
TME LAST DOSE: NORMAL H
TME LAST DOSE: NORMAL H
WBC # BLD AUTO: 3.8 10E3/UL (ref 4–11)

## 2023-08-10 PROCEDURE — 83735 ASSAY OF MAGNESIUM: CPT

## 2023-08-10 PROCEDURE — 97110 THERAPEUTIC EXERCISES: CPT | Mod: GP

## 2023-08-10 PROCEDURE — 85027 COMPLETE CBC AUTOMATED: CPT

## 2023-08-10 PROCEDURE — 36415 COLL VENOUS BLD VENIPUNCTURE: CPT

## 2023-08-10 PROCEDURE — 85610 PROTHROMBIN TIME: CPT | Mod: QW

## 2023-08-10 PROCEDURE — 97112 NEUROMUSCULAR REEDUCATION: CPT | Mod: GP

## 2023-08-10 PROCEDURE — 80197 ASSAY OF TACROLIMUS: CPT

## 2023-08-10 PROCEDURE — 82248 BILIRUBIN DIRECT: CPT

## 2023-08-10 PROCEDURE — 80053 COMPREHEN METABOLIC PANEL: CPT

## 2023-08-10 NOTE — PROGRESS NOTES
DATE:  8/10/2023      TIME OF RECEIPT FROM LAB:  8:34     ORDERING PROVIDER:            LAB TEST:  HgB     LAB VALUE:  7.4     RESULTS GIVEN WITH READ-BACK TO (PROVIDER):  Cindy Clay     TIME LAB VALUE REPORTED TO PROVIDER:   8:37    Critical value received from Tracee Agrawal LPN.     Hemoglobin consistent with previous results. Anemia services involved in care. [facility]'s providers also aware of anemia.

## 2023-08-10 NOTE — PROGRESS NOTES
ANTICOAGULATION MANAGEMENT     Damion Quinones 67 year old male is on warfarin with subtherapeutic INR result. (Goal INR 1.5-2.0)    Recent labs: (last 7 days)     08/10/23  0725   INR 1.44*       ASSESSMENT     Warfarin Lab Questionnaire    Warfarin Doses Last 7 Days      8/7/2023     7:10 AM   Dose in Tablet or Mg   TAB or MG? milligram (mg)     Pt Rptd Dose SUNDAY MONDAY TUESDAY WED THURS FRIDAY SATURDAY 8/7/2023   7:10 AM 1 1 1 1 1 1 1         8/7/2023   Warfarin Lab Questionnaire   Missed doses within past 14 days? No   Changes in diet or alcohol within past 14 days? No   Medication changes since last result? No   Injuries or illness since last result? No   New shortness of breath, severe headaches or sudden changes in vision since last result? No   Abnormal bleeding since last result? No   Upcoming surgery, procedure? No   Best number to call with results? [phone]     Previous result: Therapeutic last visit; previously outside of goal range  Additional findings:  allopurinol dose doubled on tues which could cause increase to inr .  Decreased tacrolimus and metroprolol. Dcd bumex and K+     PLAN     Recommended plan for ongoing change(s) affecting INR     Dosing Instructions: Continue your current warfarin dose with next INR in 5 days       Summary  As of 8/10/2023      Full warfarin instructions:  8/10: 1.5 mg; Otherwise 1 mg every day   Next INR check:  8/14/2023               Telephone call with Apoorva who verbalizes understanding and agrees to plan    Lab visit scheduled 8/14    Education provided:   Please call back if any changes to your diet, medications or how you've been taking warfarin    Plan made per [facility] anticoagulation protocol    Jared Duque RN  Anticoagulation Clinic  8/10/2023    _______________________________________________________________________     Anticoagulation Episode Summary       Current INR goal:  1.5-2.0   TTR:  40.0 % (1.1 mo)   Target end date:  5/3/2024   Send INR reminders  to:  [facility] [place]    Indications    Chronic atrial fibrillation (H) [I48.20]  Liver replaced by transplant (H) [Z94.4]             Comments:               Anticoagulation Care Providers       Provider Role Specialty Phone number    Gary Serrano MD Referring Family Medicine [phone]    Lashay Rachel MD Referring Internal Medicine [phone]

## 2023-08-11 ENCOUNTER — TELEPHONE (OUTPATIENT)
Dept: TRANSPLANT | Facility: CLINIC | Age: 68
End: 2023-08-11
Payer: COMMERCIAL

## 2023-08-11 ENCOUNTER — OFFICE VISIT (OUTPATIENT)
Dept: FAMILY MEDICINE | Facility: CLINIC | Age: 68
End: 2023-08-11
Payer: COMMERCIAL

## 2023-08-11 VITALS
WEIGHT: 223.5 LBS | BODY MASS INDEX: 35.01 KG/M2 | SYSTOLIC BLOOD PRESSURE: 126 MMHG | DIASTOLIC BLOOD PRESSURE: 74 MMHG | OXYGEN SATURATION: 99 % | TEMPERATURE: 98.4 F | RESPIRATION RATE: 14 BRPM | HEART RATE: 74 BPM

## 2023-08-11 DIAGNOSIS — R73.9 STEROID-INDUCED HYPERGLYCEMIA: Primary | ICD-10-CM

## 2023-08-11 DIAGNOSIS — Z94.0 KIDNEY REPLACED BY TRANSPLANT: ICD-10-CM

## 2023-08-11 DIAGNOSIS — T81.31XA DEHISCENCE OF OPERATIVE WOUND, INITIAL ENCOUNTER: ICD-10-CM

## 2023-08-11 DIAGNOSIS — D84.9 IMMUNOSUPPRESSED STATUS (H): ICD-10-CM

## 2023-08-11 DIAGNOSIS — Z94.4 LIVER TRANSPLANT RECIPIENT (H): ICD-10-CM

## 2023-08-11 DIAGNOSIS — T38.0X5A STEROID-INDUCED HYPERGLYCEMIA: Primary | ICD-10-CM

## 2023-08-11 DIAGNOSIS — I48.0 PAF (PAROXYSMAL ATRIAL FIBRILLATION) (H): ICD-10-CM

## 2023-08-11 PROCEDURE — 99214 OFFICE O/P EST MOD 30 MIN: CPT | Mod: 57 | Performed by: FAMILY MEDICINE

## 2023-08-11 RX ORDER — ALLOPURINOL 100 MG/1
300 TABLET ORAL DAILY
Qty: 180 TABLET | Refills: 3 | COMMUNITY
Start: 2023-08-11 | End: 2023-12-05

## 2023-08-11 RX ORDER — TACROLIMUS 1 MG/1
CAPSULE ORAL
Qty: 450 CAPSULE | Refills: 3 | Status: SHIPPED | OUTPATIENT
Start: 2023-08-11 | End: 2023-08-30

## 2023-08-11 NOTE — PROGRESS NOTES
"  Assessment & Plan     Steroid-induced hyperglycemia  Patient with elevated blood sugars on prednisone, last A1c 5.1, blood sugars have been running low, not using short acting insulin, will discontinue long acting insulin and continue to monitor blood sugars. If elevated consistently above 140, will restart lower dose of long acting insulin. If rare episodes of elevated blood sugars, can use sliding scale of short acting insulin    Liver transplant recipient (H)  Kidney replaced by transplant  Immunosuppressed status (H)  Patient s/p liver and kidney transplant, immunosuppressed, under care of transplant team, tacrolimus dose being adjusted     PAF (paroxysmal atrial fibrillation) (H)  Will continue coumadin at this time, plan cardiology follow up in 3-6 months for reevaluation past transplant, patient is asymptomatic    Dehiscence of operative wound  Patient with open wound right side of abdomen, no infection, packing is appropriate, continue current regimen           MED REC REQUIRED  Post Medication Reconciliation Status: discharge medications reconciled and changed, per note/orders  BMI:   Estimated body mass index is 35.01 kg/m  as calculated from the following:    Height as of 6/5/23: 1.702 m (5' 7\").    Weight as of this encounter: 101.4 kg (223 lb 8 oz).   Weight management plan: patient continuing to watch diet and exercise        Gary Serrano MD  [facility]    Lopez Peña is a 67 year old, presenting for the following health issues:  Trasplant Follow-Up (Patient states he is doing great. )        8/11/2023     2:19 PM   Additional Questions   Roomed by Nuzhat HELLER CMA   Accompanied by Wife         Hospital Follow-up Visit:    Hospital/Nursing Home/IP Rehab Facility: [facility]  Date of Admission: 06/06/2023  Date of Discharge:   Reason(s) for Admission: Transplant    Was your hospitalization related to COVID-19? No "   Problems taking medications regularly:  None  Medication changes since discharge: None  Problems adhering to non-medication therapy:  None    Summary of hospitalization:  [facility] discharge summary reviewed  Diagnostic Tests/Treatments reviewed.  Follow up needed: none  Other Healthcare Providers Involved in Patient s Care:          transplant team, rheumatology, cardiology  Update since discharge: improved.         Plan of care communicated with patient and family           Patient and wife with several issues to discuss    Diabetes follow up, patient has been taking 5 units of Lantus every day. Supposed to be using novolog but blood sugars drop low and patient feels ill. Blood sugars have all been less than 140 and sometimes down to 90, except for one spike after an infusion of d5 1/2NS.   Wound care discussed, has open wound on right abdomen, just started packing after visit with surgery, would like to exam wound  Ongoing refills reviewed. Patient off bumex. Continues on coumadin                    Review of Systems         Objective    /74   Pulse 74   Temp 98.4  F (36.9  C)   Resp 14   Wt 101.4 kg (223 lb 8 oz)   SpO2 99%   BMI 35.01 kg/m    Body mass index is 35.01 kg/m .  Physical Exam   GENERAL: healthy, alert and no distress  RESP: lungs clear to auscultation - no rales, rhonchi or wheezes  CV: irregularly irregular rhythm and no murmur, click or rub  ABDOMEN: soft, nontender, without hepatosplenomegaly or masses, bowel sounds normal, and right lateral abdomen with 3cm open wound, base is clean    Labs and notes reviewed

## 2023-08-11 NOTE — TELEPHONE ENCOUNTER
ISSUE:   Tacrolimus IR level 9.6 on 8/10, goal 8, dose 3 mg BID.    PLAN:   Please call patient and confirm this was an accurate 12-hour trough. Verify Tacrolimus IR dose 3 mg BID. Confirm no new medications or illness. Confirm no missed doses. If accurate trough and accurate dose, decrease Tacrolimus IR dose to 3 mg AM, 2 mg PM and repeat labs on Monday.     OUTCOME:   Spoke with patient, they confirm accurate trough level and current dose 3 mg BID. Patient confirmed dose change to 3 mg AM, 2 mg PM and to repeat labs on Monday. Orders sent to preferred pharmacy for dose change and lab for repeat labs. Patient voiced understanding of plan.

## 2023-08-14 ENCOUNTER — TELEPHONE (OUTPATIENT)
Dept: TRANSPLANT | Facility: CLINIC | Age: 68
End: 2023-08-14

## 2023-08-14 ENCOUNTER — ANTICOAGULATION THERAPY VISIT (OUTPATIENT)
Dept: ANTICOAGULATION | Facility: CLINIC | Age: 68
End: 2023-08-14

## 2023-08-14 ENCOUNTER — LAB (OUTPATIENT)
Dept: LAB | Facility: CLINIC | Age: 68
End: 2023-08-14
Payer: COMMERCIAL

## 2023-08-14 DIAGNOSIS — D63.1 ANEMIA IN STAGE 2 CHRONIC KIDNEY DISEASE: ICD-10-CM

## 2023-08-14 DIAGNOSIS — Z94.4 LIVER REPLACED BY TRANSPLANT (H): ICD-10-CM

## 2023-08-14 DIAGNOSIS — Z94.0 KIDNEY REPLACED BY TRANSPLANT: ICD-10-CM

## 2023-08-14 DIAGNOSIS — Z94.4 LIVER TRANSPLANT RECIPIENT (H): ICD-10-CM

## 2023-08-14 DIAGNOSIS — I48.20 CHRONIC ATRIAL FIBRILLATION (H): ICD-10-CM

## 2023-08-14 DIAGNOSIS — N18.2 ANEMIA IN STAGE 2 CHRONIC KIDNEY DISEASE: ICD-10-CM

## 2023-08-14 DIAGNOSIS — I48.20 CHRONIC ATRIAL FIBRILLATION (H): Primary | ICD-10-CM

## 2023-08-14 LAB
ALBUMIN SERPL BCG-MCNC: 3.3 G/DL (ref 3.5–5.2)
ALP SERPL-CCNC: 190 U/L (ref 40–129)
ALT SERPL W P-5'-P-CCNC: 34 U/L (ref 0–70)
ANION GAP SERPL CALCULATED.3IONS-SCNC: 15 MMOL/L (ref 7–15)
AST SERPL W P-5'-P-CCNC: 32 U/L (ref 0–45)
BILIRUB DIRECT SERPL-MCNC: <0.2 MG/DL (ref 0–0.3)
BILIRUB SERPL-MCNC: 0.4 MG/DL
BUN SERPL-MCNC: 18.1 MG/DL (ref 8–23)
CALCIUM SERPL-MCNC: 9.3 MG/DL (ref 8.8–10.2)
CHLORIDE SERPL-SCNC: 105 MMOL/L (ref 98–107)
CREAT SERPL-MCNC: 0.94 MG/DL (ref 0.67–1.17)
DEPRECATED HCO3 PLAS-SCNC: 21 MMOL/L (ref 22–29)
ERYTHROCYTE [DISTWIDTH] IN BLOOD BY AUTOMATED COUNT: 17.3 % (ref 10–15)
GFR SERPL CREATININE-BSD FRML MDRD: 90 ML/MIN/1.73M2
GLUCOSE SERPL-MCNC: 106 MG/DL (ref 70–99)
HCT VFR BLD AUTO: 27.1 % (ref 40–53)
HGB BLD-MCNC: 7.8 G/DL (ref 13.3–17.7)
INR PPP: 1.25 (ref 0.85–1.15)
MAGNESIUM SERPL-MCNC: 1.6 MG/DL (ref 1.7–2.3)
MCH RBC QN AUTO: 27.1 PG (ref 26.5–33)
MCHC RBC AUTO-ENTMCNC: 28.8 G/DL (ref 31.5–36.5)
MCV RBC AUTO: 94 FL (ref 78–100)
PHOSPHATE SERPL-MCNC: 2.8 MG/DL (ref 2.5–4.5)
PLATELET # BLD AUTO: 324 10E3/UL (ref 150–450)
POTASSIUM SERPL-SCNC: 4.1 MMOL/L (ref 3.4–5.3)
PROT SERPL-MCNC: 6.1 G/DL (ref 6.4–8.3)
RBC # BLD AUTO: 2.88 10E6/UL (ref 4.4–5.9)
SODIUM SERPL-SCNC: 141 MMOL/L (ref 136–145)
TACROLIMUS BLD-MCNC: 7.8 UG/L (ref 5–15)
TME LAST DOSE: NORMAL H
TME LAST DOSE: NORMAL H
WBC # BLD AUTO: 3 10E3/UL (ref 4–11)

## 2023-08-14 PROCEDURE — 80197 ASSAY OF TACROLIMUS: CPT

## 2023-08-14 PROCEDURE — 80180 DRUG SCRN QUAN MYCOPHENOLATE: CPT

## 2023-08-14 PROCEDURE — 85610 PROTHROMBIN TIME: CPT | Mod: QW

## 2023-08-14 PROCEDURE — 84100 ASSAY OF PHOSPHORUS: CPT

## 2023-08-14 PROCEDURE — 83735 ASSAY OF MAGNESIUM: CPT

## 2023-08-14 PROCEDURE — 82248 BILIRUBIN DIRECT: CPT

## 2023-08-14 PROCEDURE — 36415 COLL VENOUS BLD VENIPUNCTURE: CPT

## 2023-08-14 PROCEDURE — 85027 COMPLETE CBC AUTOMATED: CPT

## 2023-08-14 PROCEDURE — 80053 COMPREHEN METABOLIC PANEL: CPT

## 2023-08-14 NOTE — TELEPHONE ENCOUNTER
DATE:  8/14/2023     TIME OF RECEIPT FROM LAB:  8:44    ORDERING PROVIDER: john    LAB TEST:  hgb    LAB VALUE:  7.8    RESULTS GIVEN WITH READ-BACK TO (PROVIDER):  Debbie Clay    TIME LAB VALUE REPORTED TO PROVIDER:   8:44

## 2023-08-14 NOTE — PROGRESS NOTES
ANTICOAGULATION MANAGEMENT     Damion Quinones 67 year old male is on warfarin with subtherapeutic INR result. (Goal INR 1.5-2.0)    Recent labs: (last 7 days)     08/14/23  0724   INR 1.25*       ASSESSMENT     Source(s): Chart Review and Patient/Caregiver Call     Warfarin doses taken: Warfarin taken as instructed  Diet: No new diet changes identified  Medication/supplement changes:  previous medication changes.  New illness, injury, or hospitalization: No  Signs or symptoms of bleeding or clotting: No  Previous result: Subtherapeutic  Additional findings: None and post liver transplant at the end of June.       PLAN     Recommended plan for ongoing change(s) affecting INR     Dosing Instructions: Increase your warfarin dose (14.3% change) with next INR in 3 days       Summary  As of 8/14/2023      Full warfarin instructions:  2 mg every Mon; 1 mg all other days   Next INR check:  8/17/2023               Telephone call with Allan who verbalizes understanding and agrees to plan and who agrees to plan and repeated back plan correctly    Lab visit scheduled    Education provided:   Please call back if any changes to your diet, medications or how you've been taking warfarin  Goal range and lab monitoring: goal range and significance of current result and Importance of therapeutic range    Plan made with [facility] Pharmacist Darlene Luna, RN  Anticoagulation Clinic  8/14/2023    _______________________________________________________________________     Anticoagulation Episode Summary       Current INR goal:  1.5-2.0   TTR:  35.8 % (1.3 mo)   Target end date:  5/3/2024   Send INR reminders to:  [employer] [facility]    Indications    Chronic atrial fibrillation (H) [I48.20]  Liver replaced by transplant (H) [Z94.4]             Comments:               Anticoagulation Care Providers       Provider Role Specialty Phone number    Gary Serrano MD Referring Family Medicine [phone]    Wilson  MD [facility] Internal Medicine [phone]

## 2023-08-14 NOTE — TELEPHONE ENCOUNTER
Critical value consistent with hemoglobin baseline. Casey is followed by anemia services and surgeon and nephrologist are aware of low hgb.

## 2023-08-15 ENCOUNTER — TELEPHONE (OUTPATIENT)
Dept: TRANSPLANT | Facility: CLINIC | Age: 68
End: 2023-08-15
Payer: COMMERCIAL

## 2023-08-15 NOTE — TELEPHONE ENCOUNTER
Apoorva and Casey called with questions regarding labs, appointments, and an upcoming vacation. Apoorva had questions about whether all lab appointments that are scheduled are needed. Will send message to kidney coordinator to find out kidney lab frequency, ok for weekly from liver standpoint.   Apoorva and Casey are planning a trip out west in October for 3-4 weeks. Casey has a video appointment with Dr. Dutta during that time and they are wondering what to do with that. Informed them to talk to Dr. Dutta about it at Casey's next appointment which they will. By that point, Casey will be close to monthly for labs so getting labs before they leave and when they return should be ok, at least from the liver side.   Let Apoorva know that I would get back to her regarding lab frequency.

## 2023-08-16 DIAGNOSIS — Z94.4 LIVER TRANSPLANT RECIPIENT (H): ICD-10-CM

## 2023-08-16 LAB
MYCOPHENOLATE SERPL LC/MS/MS-MCNC: 1.51 MG/L (ref 1–3.5)
MYCOPHENOLATE-G SERPL LC/MS/MS-MCNC: 79.2 MG/L (ref 30–95)
TME LAST DOSE: NORMAL H
TME LAST DOSE: NORMAL H

## 2023-08-16 RX ORDER — MULTIPLE VITAMINS W/ MINERALS TAB 9MG-400MCG
1 TAB ORAL DAILY
Qty: 30 TABLET | Refills: 0 | Status: SHIPPED | OUTPATIENT
Start: 2023-08-16

## 2023-08-16 RX ORDER — PANTOPRAZOLE SODIUM 40 MG/1
40 TABLET, DELAYED RELEASE ORAL
Qty: 30 TABLET | Refills: 0 | Status: SHIPPED | OUTPATIENT
Start: 2023-08-16 | End: 2023-09-14

## 2023-08-16 RX ORDER — MAGNESIUM OXIDE 400 MG/1
800 TABLET ORAL 2 TIMES DAILY
Qty: 60 TABLET | Refills: 0 | Status: SHIPPED | OUTPATIENT
Start: 2023-08-16

## 2023-08-16 RX ORDER — ATORVASTATIN CALCIUM 20 MG/1
20 TABLET, FILM COATED ORAL EVERY EVENING
Qty: 30 TABLET | Refills: 0 | Status: SHIPPED | OUTPATIENT
Start: 2023-08-16 | End: 2023-09-14

## 2023-08-16 RX ORDER — ASPIRIN 325 MG
325 TABLET ORAL DAILY
Qty: 30 TABLET | Refills: 0 | Status: SHIPPED | OUTPATIENT
Start: 2023-08-16 | End: 2023-08-22 | Stop reason: ALTCHOICE

## 2023-08-16 RX ORDER — WARFARIN SODIUM 1 MG/1
1 TABLET ORAL DAILY
Qty: 30 TABLET | Refills: 0 | Status: SHIPPED | OUTPATIENT
Start: 2023-08-16 | End: 2023-08-22 | Stop reason: ALTCHOICE

## 2023-08-17 ENCOUNTER — LAB (OUTPATIENT)
Dept: LAB | Facility: CLINIC | Age: 68
End: 2023-08-17
Payer: COMMERCIAL

## 2023-08-17 ENCOUNTER — PATIENT OUTREACH (OUTPATIENT)
Dept: CARE COORDINATION | Facility: CLINIC | Age: 68
End: 2023-08-17

## 2023-08-17 ENCOUNTER — ANTICOAGULATION THERAPY VISIT (OUTPATIENT)
Dept: ANTICOAGULATION | Facility: CLINIC | Age: 68
End: 2023-08-17

## 2023-08-17 ENCOUNTER — TELEPHONE (OUTPATIENT)
Dept: TRANSPLANT | Facility: CLINIC | Age: 68
End: 2023-08-17

## 2023-08-17 ENCOUNTER — INFUSION THERAPY VISIT (OUTPATIENT)
Dept: INFUSION THERAPY | Facility: CLINIC | Age: 68
End: 2023-08-17
Attending: INTERNAL MEDICINE
Payer: COMMERCIAL

## 2023-08-17 VITALS
SYSTOLIC BLOOD PRESSURE: 144 MMHG | TEMPERATURE: 97.7 F | OXYGEN SATURATION: 100 % | HEART RATE: 108 BPM | DIASTOLIC BLOOD PRESSURE: 94 MMHG | RESPIRATION RATE: 18 BRPM

## 2023-08-17 DIAGNOSIS — Z79.899 ENCOUNTER FOR LONG-TERM CURRENT USE OF MEDICATION: ICD-10-CM

## 2023-08-17 DIAGNOSIS — Z94.4 LIVER TRANSPLANT RECIPIENT (H): ICD-10-CM

## 2023-08-17 DIAGNOSIS — Z94.4 LIVER REPLACED BY TRANSPLANT (H): ICD-10-CM

## 2023-08-17 DIAGNOSIS — I48.20 CHRONIC ATRIAL FIBRILLATION (H): ICD-10-CM

## 2023-08-17 DIAGNOSIS — Z94.0 KIDNEY REPLACED BY TRANSPLANT: ICD-10-CM

## 2023-08-17 DIAGNOSIS — I48.20 CHRONIC ATRIAL FIBRILLATION (H): Primary | ICD-10-CM

## 2023-08-17 DIAGNOSIS — Z48.298 AFTERCARE FOLLOWING ORGAN TRANSPLANT: ICD-10-CM

## 2023-08-17 DIAGNOSIS — Z94.0 KIDNEY REPLACED BY TRANSPLANT: Primary | ICD-10-CM

## 2023-08-17 DIAGNOSIS — D63.8 ANEMIA IN OTHER CHRONIC DISEASES CLASSIFIED ELSEWHERE: Primary | ICD-10-CM

## 2023-08-17 DIAGNOSIS — Z94.4 LIVER REPLACED BY TRANSPLANT (H): Primary | ICD-10-CM

## 2023-08-17 DIAGNOSIS — N18.2 CKD (CHRONIC KIDNEY DISEASE), STAGE II: ICD-10-CM

## 2023-08-17 DIAGNOSIS — Z94.0 KIDNEY TRANSPLANTED: ICD-10-CM

## 2023-08-17 LAB
ALBUMIN SERPL BCG-MCNC: 3.4 G/DL (ref 3.5–5.2)
ALP SERPL-CCNC: 188 U/L (ref 40–129)
ALT SERPL W P-5'-P-CCNC: 38 U/L (ref 0–70)
ANION GAP SERPL CALCULATED.3IONS-SCNC: 13 MMOL/L (ref 7–15)
AST SERPL W P-5'-P-CCNC: 32 U/L (ref 0–45)
BASOPHILS # BLD MANUAL: 0 10E3/UL (ref 0–0.2)
BASOPHILS NFR BLD MANUAL: 2 %
BILIRUB DIRECT SERPL-MCNC: <0.2 MG/DL (ref 0–0.3)
BILIRUB SERPL-MCNC: 0.3 MG/DL
BUN SERPL-MCNC: 14.9 MG/DL (ref 8–23)
CALCIUM SERPL-MCNC: 9.1 MG/DL (ref 8.8–10.2)
CHLORIDE SERPL-SCNC: 104 MMOL/L (ref 98–107)
CREAT SERPL-MCNC: 1.01 MG/DL (ref 0.67–1.17)
DEPRECATED HCO3 PLAS-SCNC: 22 MMOL/L (ref 22–29)
EOSINOPHIL # BLD MANUAL: 0.2 10E3/UL (ref 0–0.7)
EOSINOPHIL NFR BLD MANUAL: 8 %
ERYTHROCYTE [DISTWIDTH] IN BLOOD BY AUTOMATED COUNT: 17.5 % (ref 10–15)
GFR SERPL CREATININE-BSD FRML MDRD: 83 ML/MIN/1.73M2
GLUCOSE SERPL-MCNC: 98 MG/DL (ref 70–99)
HCT VFR BLD AUTO: 26.3 % (ref 40–53)
HGB BLD-MCNC: 7.5 G/DL (ref 13.3–17.7)
INR PPP: 1.22 (ref 0.85–1.15)
LYMPHOCYTES # BLD MANUAL: 0.3 10E3/UL (ref 0.8–5.3)
LYMPHOCYTES NFR BLD MANUAL: 14 %
MAGNESIUM SERPL-MCNC: 1.6 MG/DL (ref 1.7–2.3)
MCH RBC QN AUTO: 26.9 PG (ref 26.5–33)
MCHC RBC AUTO-ENTMCNC: 28.5 G/DL (ref 31.5–36.5)
MCV RBC AUTO: 94 FL (ref 78–100)
METAMYELOCYTES # BLD MANUAL: 0.1 10E3/UL
METAMYELOCYTES NFR BLD MANUAL: 4 %
MONOCYTES # BLD MANUAL: 0 10E3/UL (ref 0–1.3)
MONOCYTES NFR BLD MANUAL: 2 %
MYELOCYTES # BLD MANUAL: 0 10E3/UL
MYELOCYTES NFR BLD MANUAL: 1 %
NEUTROPHILS # BLD MANUAL: 1.7 10E3/UL (ref 1.6–8.3)
NEUTROPHILS NFR BLD MANUAL: 69 %
NRBC # BLD AUTO: 0 10E3/UL
NRBC BLD MANUAL-RTO: 2 %
PHOSPHATE SERPL-MCNC: 3.1 MG/DL (ref 2.5–4.5)
PLAT MORPH BLD: ABNORMAL
PLATELET # BLD AUTO: 293 10E3/UL (ref 150–450)
POLYCHROMASIA BLD QL SMEAR: SLIGHT
POTASSIUM SERPL-SCNC: 4.1 MMOL/L (ref 3.4–5.3)
PROT SERPL-MCNC: 5.8 G/DL (ref 6.4–8.3)
RBC # BLD AUTO: 2.79 10E6/UL (ref 4.4–5.9)
RBC MORPH BLD: ABNORMAL
SODIUM SERPL-SCNC: 139 MMOL/L (ref 136–145)
TACROLIMUS BLD-MCNC: 7.5 UG/L (ref 5–15)
TARGETS BLD QL SMEAR: SLIGHT
TME LAST DOSE: NORMAL H
TME LAST DOSE: NORMAL H
WBC # BLD AUTO: 2.3 10E3/UL (ref 4–11)
WBC # BLD AUTO: 2.4 10E3/UL (ref 4–11)

## 2023-08-17 PROCEDURE — 84100 ASSAY OF PHOSPHORUS: CPT

## 2023-08-17 PROCEDURE — 80053 COMPREHEN METABOLIC PANEL: CPT

## 2023-08-17 PROCEDURE — 36415 COLL VENOUS BLD VENIPUNCTURE: CPT

## 2023-08-17 PROCEDURE — 85027 COMPLETE CBC AUTOMATED: CPT

## 2023-08-17 PROCEDURE — 83735 ASSAY OF MAGNESIUM: CPT

## 2023-08-17 PROCEDURE — 82248 BILIRUBIN DIRECT: CPT

## 2023-08-17 PROCEDURE — 85610 PROTHROMBIN TIME: CPT | Mod: QW

## 2023-08-17 PROCEDURE — 87799 DETECT AGENT NOS DNA QUANT: CPT

## 2023-08-17 PROCEDURE — 80197 ASSAY OF TACROLIMUS: CPT

## 2023-08-17 PROCEDURE — 85007 BL SMEAR W/DIFF WBC COUNT: CPT | Mod: QW

## 2023-08-17 PROCEDURE — 250N000011 HC RX IP 250 OP 636: Mod: JZ,EC | Performed by: INTERNAL MEDICINE

## 2023-08-17 PROCEDURE — 96372 THER/PROPH/DIAG INJ SC/IM: CPT | Performed by: INTERNAL MEDICINE

## 2023-08-17 RX ORDER — HEPARIN SODIUM,PORCINE 10 UNIT/ML
5-20 VIAL (ML) INTRAVENOUS DAILY PRN
Status: CANCELLED | OUTPATIENT
Start: 2023-08-17

## 2023-08-17 RX ORDER — ALBUTEROL SULFATE 0.83 MG/ML
2.5 SOLUTION RESPIRATORY (INHALATION)
Status: CANCELLED | OUTPATIENT
Start: 2023-08-17

## 2023-08-17 RX ORDER — MEPERIDINE HYDROCHLORIDE 50 MG/ML
25 INJECTION INTRAMUSCULAR; INTRAVENOUS; SUBCUTANEOUS EVERY 30 MIN PRN
Status: CANCELLED | OUTPATIENT
Start: 2023-08-17

## 2023-08-17 RX ORDER — DIPHENHYDRAMINE HYDROCHLORIDE 50 MG/ML
50 INJECTION INTRAMUSCULAR; INTRAVENOUS
Status: DISCONTINUED | OUTPATIENT
Start: 2023-08-17 | End: 2023-08-17 | Stop reason: HOSPADM

## 2023-08-17 RX ORDER — METHYLPREDNISOLONE SODIUM SUCCINATE 125 MG/2ML
125 INJECTION, POWDER, LYOPHILIZED, FOR SOLUTION INTRAMUSCULAR; INTRAVENOUS
Status: CANCELLED
Start: 2023-08-17

## 2023-08-17 RX ORDER — EPINEPHRINE 1 MG/ML
0.3 INJECTION, SOLUTION INTRAMUSCULAR; SUBCUTANEOUS EVERY 5 MIN PRN
Status: DISCONTINUED | OUTPATIENT
Start: 2023-08-17 | End: 2023-08-17 | Stop reason: HOSPADM

## 2023-08-17 RX ORDER — PREDNISONE 10 MG/1
10 TABLET ORAL DAILY
Qty: 90 TABLET | Refills: 3 | Status: SHIPPED | OUTPATIENT
Start: 2023-08-17 | End: 2023-08-29

## 2023-08-17 RX ORDER — MEPERIDINE HYDROCHLORIDE 50 MG/ML
25 INJECTION INTRAMUSCULAR; INTRAVENOUS; SUBCUTANEOUS EVERY 30 MIN PRN
Status: DISCONTINUED | OUTPATIENT
Start: 2023-08-17 | End: 2023-08-17 | Stop reason: HOSPADM

## 2023-08-17 RX ORDER — METHYLPREDNISOLONE SODIUM SUCCINATE 125 MG/2ML
125 INJECTION, POWDER, LYOPHILIZED, FOR SOLUTION INTRAMUSCULAR; INTRAVENOUS
Status: DISCONTINUED | OUTPATIENT
Start: 2023-08-17 | End: 2023-08-17 | Stop reason: HOSPADM

## 2023-08-17 RX ORDER — EPINEPHRINE 1 MG/ML
0.3 INJECTION, SOLUTION INTRAMUSCULAR; SUBCUTANEOUS EVERY 5 MIN PRN
Status: CANCELLED | OUTPATIENT
Start: 2023-08-17

## 2023-08-17 RX ORDER — ALBUTEROL SULFATE 90 UG/1
1-2 AEROSOL, METERED RESPIRATORY (INHALATION)
Status: DISCONTINUED | OUTPATIENT
Start: 2023-08-17 | End: 2023-08-17 | Stop reason: HOSPADM

## 2023-08-17 RX ORDER — DIPHENHYDRAMINE HYDROCHLORIDE 50 MG/ML
50 INJECTION INTRAMUSCULAR; INTRAVENOUS
Status: CANCELLED
Start: 2023-08-17

## 2023-08-17 RX ORDER — EPINEPHRINE 1 MG/ML
0.3 INJECTION, SOLUTION, CONCENTRATE INTRAVENOUS EVERY 5 MIN PRN
Status: CANCELLED | OUTPATIENT
Start: 2023-08-17

## 2023-08-17 RX ORDER — URSODIOL 250 MG/1
250 TABLET, FILM COATED ORAL 2 TIMES DAILY
Qty: 180 TABLET | Refills: 3 | Status: SHIPPED | OUTPATIENT
Start: 2023-08-17 | End: 2023-12-28

## 2023-08-17 RX ORDER — ALBUTEROL SULFATE 90 UG/1
1-2 AEROSOL, METERED RESPIRATORY (INHALATION)
Status: CANCELLED
Start: 2023-08-17

## 2023-08-17 RX ORDER — MEPERIDINE HYDROCHLORIDE 25 MG/ML
25 INJECTION INTRAMUSCULAR; INTRAVENOUS; SUBCUTANEOUS EVERY 30 MIN PRN
Status: CANCELLED | OUTPATIENT
Start: 2023-08-17

## 2023-08-17 RX ORDER — VALGANCICLOVIR 450 MG/1
450 TABLET, FILM COATED ORAL DAILY
Qty: 180 TABLET | Refills: 0 | Status: ON HOLD | OUTPATIENT
Start: 2023-08-17 | End: 2023-09-28

## 2023-08-17 RX ORDER — HEPARIN SODIUM (PORCINE) LOCK FLUSH IV SOLN 100 UNIT/ML 100 UNIT/ML
5 SOLUTION INTRAVENOUS
Status: CANCELLED | OUTPATIENT
Start: 2023-08-17

## 2023-08-17 RX ORDER — ALBUTEROL SULFATE 0.83 MG/ML
2.5 SOLUTION RESPIRATORY (INHALATION)
Status: DISCONTINUED | OUTPATIENT
Start: 2023-08-17 | End: 2023-08-17 | Stop reason: HOSPADM

## 2023-08-17 RX ORDER — SULFAMETHOXAZOLE AND TRIMETHOPRIM 400; 80 MG/1; MG/1
1 TABLET ORAL DAILY
Qty: 90 TABLET | Refills: 3 | Status: SHIPPED | OUTPATIENT
Start: 2023-08-17 | End: 2024-08-14

## 2023-08-17 RX ADMIN — DARBEPOETIN ALFA 40 MCG: 40 INJECTION, SOLUTION INTRAVENOUS; SUBCUTANEOUS at 09:29

## 2023-08-17 NOTE — TELEPHONE ENCOUNTER
DATE:  8/17/2023     TIME OF RECEIPT FROM LAB:  8:02 AM    ORDERING PROVIDER:     LAB TEST:  Hgb    LAB VALUE:  7.5    RESULTS GIVEN WITH READ-BACK TO (PROVIDER):  Cindy Clay RN    TIME LAB VALUE REPORTED TO PROVIDER:   8:25 AM

## 2023-08-17 NOTE — PROGRESS NOTES
ANTICOAGULATION MANAGEMENT     Damion Quinones 67 year old male is on warfarin with subtherapeutic INR result. (Goal INR 1.5-2.0)    Recent labs: (last 7 days)     08/17/23  0720   INR 1.22*       ASSESSMENT     Source(s): Chart Review and Patient/Caregiver Call     Warfarin doses taken: Warfarin taken as instructed  Diet: No new diet changes identified  Medication/supplement changes: None noted  New illness, injury, or hospitalization: No  Signs or symptoms of bleeding or clotting: No  Previous result: Subtherapeutic  Additional findings:  Anemia, iron infusions to start soon. Recovering from transplant but doing ok.       PLAN     Recommended plan for ongoing change(s) affecting INR     Dosing Instructions: booster dose then Increase your warfarin dose (18.8% change) with next INR in 4 days       Summary  As of 8/17/2023      Full warfarin instructions:  8/17: 2 mg; Otherwise 1 mg every Sun, Wed; 1.5 mg all other days   Next INR check:  8/21/2023               Telephone call with wife, Apoorva who verbalizes understanding and agrees to plan and who agrees to plan and repeated back plan correctly  Sent [employer] message with dosing and follow up instructions    Lab visit scheduled    Education provided:   Goal range and lab monitoring: goal range and significance of current result  Written instructions provided  Contact [phone] with any changes, questions or concerns.     Plan made with [facility] Pharmacist Darlene Naylor, RN  Anticoagulation Clinic  8/17/2023    _______________________________________________________________________     Anticoagulation Episode Summary       Current INR goal:  1.5-2.0   TTR:  33.2 % (1.4 mo)   Target end date:  5/3/2024   Send INR reminders to:  [facility]    Indications    Chronic atrial fibrillation (H) [I48.20]  Liver replaced by transplant (H) [Z94.4]             Comments:               Anticoagulation Care Providers       Provider Role Specialty Phone number     Gary Serrano MD Referring Family Medicine [phone]    Lashay Rachel MD Referring Internal Medicine [phone]

## 2023-08-17 NOTE — PROGRESS NOTES
Anemia Management Note  SUBJECTIVE/OBJECTIVE:  Referred by Dr. Subhash Dutta on 2023  Primary Diagnosis: Anemia of Other Chronic Illness (D63.8)     Secondary Diagnosis:  Organ or tissue replaced by transplant, kidney (Z94.0)  Kidney Tx: 2023  Hgb goal range:  9-10  Epo/Darbo: Aranesp  40 mcg  every two weeks for Hgb <10.0. In clinic  *If Aranesp is ordered dose at 0.45mcg/kg  Iron regimen:  NA  Labs : 2024  Recent FEDERICO use, transfusion, IV iron: Mircera   RX/TX plans : 2024     *[facility]; [phone]      Hx of DVT (2023), Afib, anticoagulate.      Consent to Communicate:  OK to speak with Nabila Quinones (wife) per consent to communicate dated 10/27/2022. No Boxes Checked on Consent to Communicate.            Latest Ref Rng & Units 2023     9:24 AM 2023     7:12 AM 2023     7:26 AM 8/10/2023     7:25 AM 2023     7:24 AM 2023     7:20 AM 2023    10:00 AM   Anemia   FEDERICO Dose        40mcg   Hemoglobin 13.3 - 17.7 g/dL  7.5  7.4  7.4  7.8  7.5     Ferritin 31 - 409 ng/mL 506   440          BP Readings from Last 3 Encounters:   23 (!) 144/94   23 126/74   23 107/72     Wt Readings from Last 2 Encounters:   23 101.4 kg (223 lb 8 oz)   23 97.8 kg (215 lb 9.6 oz)           ASSESSMENT:  Hgb:Not at goal/Initiation of therapy  TSat: not at goal of >30% Ferritin: At goal (>100ng/mL)    PLAN:  Dose with aranesp and RTC for hgb then aranesp if needed in 2 week(s). Dr. Dutta would like for Casey to get Iron Infusions.  [employer] message sent to Allan.     Orders needed to be renewed (for next follow-up date) in EPIC: None    Iron labs due:  Needs IV Iron per Dr. Dutta    Plan discussed with:  Sara melgoza       NEXT FOLLOW-UP DATE:  23    Molly Fenton RN   Anemia Services  [facility]  [email or web address]   Office : [phone]  Fax: [phone]

## 2023-08-17 NOTE — TELEPHONE ENCOUNTER
WBC 2.3 this morning. Message sent to Dr. Clifton requesting to decrease or stop valcyte. Currently taking valcyte 900 mg daily, Dr. Clifton ok with decreasing dose to 450 mg daily.     Call to Apoorva and Casey to decrease valcyte dose and to let them know that lab frequency can be decreased to weekly.

## 2023-08-17 NOTE — TELEPHONE ENCOUNTER
ANTICOAGULATION  MANAGEMENT     Interacting Medication Review    Interacting medication(s):  Prednisone, Bactrim  with warfarin.    Duration: Long-term    Indication:  Transplant    New medication?: No, long term medication. No affect on INR anticipated at this time.        PLAN     No adjustment to anticoagulation plan needed; no further interaction anticipated with stable long term medication         Patient was not contacted    No adjustment to anticoagulation calendar was required    Plan made per [facility] anticoagulation protocol    Alejandra Naylor RN  Anticoagulation Clinic

## 2023-08-17 NOTE — PROGRESS NOTES
Infusion Nursing Note:  Damion Quinones presents today for aranesp injection.    Patient seen by provider today: No   present during visit today: Not Applicable.    Note: BP (!) 144/94 (Patient Position: Sitting)   Pulse 108   Temp 97.7  F (36.5  C)   Resp 18   SpO2 100% . Aranesp given to LLQ.      Intravenous Access:  No Intravenous access/labs at this visit.    Treatment Conditions:  Lab Results   Component Value Date    HGB 7.5 (LL) 08/17/2023    WBC 2.3 (L) 08/17/2023    ANEU 6.4 07/19/2023    ANEUTAUTO 7.7 07/20/2023     08/17/2023        Results reviewed, labs MET treatment parameters, ok to proceed with treatment.  Hemoglobin 7.5, therefore aranesp ordered.    Post Infusion Assessment:  Patient tolerated injection without incident.       Discharge Plan:   Patient and/or family verbalized understanding of discharge instructions and all questions answered.  Patient discharged in stable condition accompanied by: self.  Departure Mode: Ambulatory.      Timmy Delacruz RN

## 2023-08-18 LAB — BKV DNA # SPEC NAA+PROBE: NOT DETECTED COPIES/ML

## 2023-08-20 ENCOUNTER — TELEPHONE (OUTPATIENT)
Dept: TRANSPLANT | Facility: CLINIC | Age: 68
End: 2023-08-20
Payer: COMMERCIAL

## 2023-08-20 DIAGNOSIS — Z94.0 KIDNEY TRANSPLANTED: Primary | ICD-10-CM

## 2023-08-20 NOTE — TELEPHONE ENCOUNTER
With next labs please get UPCR, refer to anemia services. He sees rheum tmrw at [facility]. I gave the patient's wife my pager number to call me to discuss given that he is still on pred 10, continues to have gout symptoms. I want to decrease his pred down to 5mg daily in the coming weeks if possible.

## 2023-08-21 ENCOUNTER — LAB (OUTPATIENT)
Dept: LAB | Facility: CLINIC | Age: 68
End: 2023-08-21
Attending: TRANSPLANT SURGERY
Payer: COMMERCIAL

## 2023-08-21 ENCOUNTER — OFFICE VISIT (OUTPATIENT)
Dept: TRANSPLANT | Facility: CLINIC | Age: 68
End: 2023-08-21
Attending: TRANSPLANT SURGERY
Payer: COMMERCIAL

## 2023-08-21 VITALS
WEIGHT: 223.6 LBS | HEART RATE: 111 BPM | OXYGEN SATURATION: 100 % | BODY MASS INDEX: 35.02 KG/M2 | SYSTOLIC BLOOD PRESSURE: 105 MMHG | DIASTOLIC BLOOD PRESSURE: 70 MMHG

## 2023-08-21 DIAGNOSIS — Z94.0 KIDNEY REPLACED BY TRANSPLANT: ICD-10-CM

## 2023-08-21 DIAGNOSIS — Z48.298 AFTERCARE FOLLOWING ORGAN TRANSPLANT: ICD-10-CM

## 2023-08-21 DIAGNOSIS — D63.1 ANEMIA IN STAGE 2 CHRONIC KIDNEY DISEASE: ICD-10-CM

## 2023-08-21 DIAGNOSIS — Z79.899 ENCOUNTER FOR LONG-TERM CURRENT USE OF MEDICATION: ICD-10-CM

## 2023-08-21 DIAGNOSIS — Z94.4 LIVER REPLACED BY TRANSPLANT (H): ICD-10-CM

## 2023-08-21 DIAGNOSIS — N18.2 ANEMIA IN STAGE 2 CHRONIC KIDNEY DISEASE: ICD-10-CM

## 2023-08-21 DIAGNOSIS — Z94.0 KIDNEY TRANSPLANTED: ICD-10-CM

## 2023-08-21 DIAGNOSIS — Z94.4 LIVER TRANSPLANT RECIPIENT (H): ICD-10-CM

## 2023-08-21 LAB
ALBUMIN MFR UR ELPH: 22.5 MG/DL
ALBUMIN SERPL BCG-MCNC: 3.6 G/DL (ref 3.5–5.2)
ALP SERPL-CCNC: 177 U/L (ref 40–129)
ALT SERPL W P-5'-P-CCNC: 27 U/L (ref 0–70)
ANION GAP SERPL CALCULATED.3IONS-SCNC: 12 MMOL/L (ref 7–15)
AST SERPL W P-5'-P-CCNC: 18 U/L (ref 0–45)
BASOPHILS # BLD MANUAL: 0.1 10E3/UL (ref 0–0.2)
BASOPHILS NFR BLD MANUAL: 2 %
BILIRUB DIRECT SERPL-MCNC: <0.2 MG/DL (ref 0–0.3)
BILIRUB SERPL-MCNC: 0.4 MG/DL
BUN SERPL-MCNC: 15.3 MG/DL (ref 8–23)
CALCIUM SERPL-MCNC: 9.3 MG/DL (ref 8.8–10.2)
CHLORIDE SERPL-SCNC: 104 MMOL/L (ref 98–107)
CREAT SERPL-MCNC: 1 MG/DL (ref 0.67–1.17)
CREAT UR-MCNC: 139 MG/DL
DEPRECATED HCO3 PLAS-SCNC: 24 MMOL/L (ref 22–29)
EOSINOPHIL # BLD MANUAL: 0.1 10E3/UL (ref 0–0.7)
EOSINOPHIL NFR BLD MANUAL: 5 %
ERYTHROCYTE [DISTWIDTH] IN BLOOD BY AUTOMATED COUNT: 17.6 % (ref 10–15)
GFR SERPL CREATININE-BSD FRML MDRD: 84 ML/MIN/1.73M2
GLUCOSE SERPL-MCNC: 104 MG/DL (ref 70–99)
HCT VFR BLD AUTO: 28.5 % (ref 40–53)
HGB BLD-MCNC: 8.2 G/DL (ref 13.3–17.7)
LYMPHOCYTES # BLD MANUAL: 0.3 10E3/UL (ref 0.8–5.3)
LYMPHOCYTES NFR BLD MANUAL: 10 %
MAGNESIUM SERPL-MCNC: 1.5 MG/DL (ref 1.7–2.3)
MCH RBC QN AUTO: 26.9 PG (ref 26.5–33)
MCHC RBC AUTO-ENTMCNC: 28.8 G/DL (ref 31.5–36.5)
MCV RBC AUTO: 93 FL (ref 78–100)
METAMYELOCYTES # BLD MANUAL: 0.1 10E3/UL
METAMYELOCYTES NFR BLD MANUAL: 2 %
MONOCYTES # BLD MANUAL: 0.3 10E3/UL (ref 0–1.3)
MONOCYTES NFR BLD MANUAL: 10 %
MYELOCYTES # BLD MANUAL: 0.3 10E3/UL
MYELOCYTES NFR BLD MANUAL: 10 %
NEUTROPHILS # BLD MANUAL: 1.6 10E3/UL (ref 1.6–8.3)
NEUTROPHILS NFR BLD MANUAL: 61 %
PHOSPHATE SERPL-MCNC: 2.7 MG/DL (ref 2.5–4.5)
PLAT MORPH BLD: ABNORMAL
PLATELET # BLD AUTO: 262 10E3/UL (ref 150–450)
POLYCHROMASIA BLD QL SMEAR: SLIGHT
POTASSIUM SERPL-SCNC: 4.1 MMOL/L (ref 3.4–5.3)
PROT SERPL-MCNC: 5.9 G/DL (ref 6.4–8.3)
PROT/CREAT 24H UR: 0.16 MG/MG CR (ref 0–0.2)
RBC # BLD AUTO: 3.05 10E6/UL (ref 4.4–5.9)
RBC MORPH BLD: ABNORMAL
SODIUM SERPL-SCNC: 140 MMOL/L (ref 136–145)
TACROLIMUS BLD-MCNC: 8.1 UG/L (ref 5–15)
TME LAST DOSE: NORMAL H
TME LAST DOSE: NORMAL H
WBC # BLD AUTO: 2.6 10E3/UL (ref 4–11)
WBC # BLD AUTO: 2.6 10E3/UL (ref 4–11)

## 2023-08-21 PROCEDURE — 80180 DRUG SCRN QUAN MYCOPHENOLATE: CPT | Performed by: INTERNAL MEDICINE

## 2023-08-21 PROCEDURE — 87799 DETECT AGENT NOS DNA QUANT: CPT | Performed by: INTERNAL MEDICINE

## 2023-08-21 PROCEDURE — 36415 COLL VENOUS BLD VENIPUNCTURE: CPT | Performed by: PATHOLOGY

## 2023-08-21 PROCEDURE — 82248 BILIRUBIN DIRECT: CPT | Performed by: PATHOLOGY

## 2023-08-21 PROCEDURE — 80053 COMPREHEN METABOLIC PANEL: CPT | Performed by: PATHOLOGY

## 2023-08-21 PROCEDURE — 85027 COMPLETE CBC AUTOMATED: CPT | Performed by: PATHOLOGY

## 2023-08-21 PROCEDURE — 84156 ASSAY OF PROTEIN URINE: CPT | Performed by: PATHOLOGY

## 2023-08-21 PROCEDURE — 83735 ASSAY OF MAGNESIUM: CPT | Performed by: PATHOLOGY

## 2023-08-21 PROCEDURE — 85007 BL SMEAR W/DIFF WBC COUNT: CPT | Performed by: PATHOLOGY

## 2023-08-21 PROCEDURE — G0463 HOSPITAL OUTPT CLINIC VISIT: HCPCS | Performed by: TRANSPLANT SURGERY

## 2023-08-21 PROCEDURE — 84100 ASSAY OF PHOSPHORUS: CPT | Performed by: PATHOLOGY

## 2023-08-21 PROCEDURE — 80197 ASSAY OF TACROLIMUS: CPT | Performed by: TRANSPLANT SURGERY

## 2023-08-21 PROCEDURE — 99213 OFFICE O/P EST LOW 20 MIN: CPT | Mod: 24 | Performed by: TRANSPLANT SURGERY

## 2023-08-21 PROCEDURE — 99000 SPECIMEN HANDLING OFFICE-LAB: CPT | Performed by: PATHOLOGY

## 2023-08-21 NOTE — LETTER
8/21/2023         RE: Damion Quinones   [address] [facility] [address]        Dear Colleague,    Thank you for referring your patient, Damion Quinones, to the [facility]. Please see a copy of my visit note below.    Transplant Surgery -OUTPATIENT IMMUNOSUPPRESSION PROGRESS NOTE    Date of Visit: 08/21/2023    Transplants:  6/6/2023 (Liver), 6/6/2023 (Kidney); Postoperative day:  76 (Liver), 76 (Kidney)  ASSESMENT AND PLAN:  1.Graft Function:Liver allograft: no rejection or technical problems.  Kidney allograft: no rejection or technical problems;     2.Immunosuppression Management:Keep tacrolimus levels at 8-10 ng/dL  3.Hypertension:ok  4.Renal Function:better  5.Lab frequency:weekly  6.Other:  Needs to see a cardiologist  Needs to see anemia clinic  Continue physical therapy  Wound healthy    Date: August 21, 2023    Transplant:  [x]                             Liver [x]                              Kidney []                             Pancreas []                              Other:             Chief Complaint:Post-op Visit (LIVER /KIDNEY  )    Doing well   History of Present Illness:  Patient Active Problem List   Diagnosis     Psoriatic arthritis (H)     PAF (paroxysmal atrial fibrillation) (H)     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)     HTN, kidney transplant related     Hereditary hemochromatosis (H)     Other hyperlipidemia     Tophaceous gout     Deep vein thrombosis (DVT) of non-extremity vein, unspecified chronicity     CKD (chronic kidney disease), stage II     Chronic atrial fibrillation (H)     Liver replaced by transplant (H)     Immunosuppressed status (H)     Kidney replaced by transplant     CAD (coronary artery disease)     Steroid-induced hyperglycemia     Muscular deconditioning     Aftercare following organ transplant     Anemia in other chronic diseases classified elsewhere     SOCIAL /FAMILY HISTORY: [x]                   No recent change    Past Medical History:   Diagnosis Date     Alcoholic cirrhosis of liver with ascites (H) 10/11/2019     ANCA-associated vasculitis (H) 2022     C. difficile colitis      Coronary artery disease     [facility] 4/2023 - complete occlusion of RCA     ESRD (end stage renal disease) on dialysis (H)      Gout      History of hemochromatosis 10/11/2019     Hypertension      IgA nephropathy      Obesity      PAF (paroxysmal atrial fibrillation) (H)      Pre-diabetes 2023     Psoriatic arthritis (H)      Psoriatic arthritis (H)      RA (rheumatoid arthritis) (H)      Status post kidney transplant 06/06/2023    Induction with thymoglobulin 4mg/kg, + DSA CW9     Status post liver transplantation (H) 06/06/2023     Past Surgical History:   Procedure Laterality Date     APPENDECTOMY      Removed at 18 Years Old      BENCH KIDNEY  06/06/2023    Procedure: Bench kidney;  Surgeon: Chad Clifton MD;  Location:  OR     [facility] LIVER  06/06/2023    Procedure: Bench liver;  Surgeon: Chad Clifton MD;  Location:  OR     COLONOSCOPY      2014 at [facility]      CV CORONARY ANGIOGRAM N/A 04/27/2023    Procedure: Coronary Angiogram;  Surgeon: Pablo Araujo MD;  Location:  HEART CARDIAC CATH LAB     EYE SURGERY Bilateral     Cataract     H STATISTIC PICC LINE INSERTION >5YR, FAILED Left 06/16/2023    Unable to advance catheter over the axillary area     HERNIA REPAIR      History of bilateral inguinal hernia repair: 10/28/2014. Open hernia repair: 10/2017. Abdominal wound exploration and debridement 12/27/2017     INSERT SHUNT PORTAL TRANSJUGULAR INTRAHEPTIC  09/26/2022     IR CVC NON TUNNEL LINE REMOVAL  7/3/2023     IR CVC NON TUNNEL PLACEMENT > 5 YRS  6/14/2023     IR CVC TUNNEL PLACEMENT > 5 YRS OF AGE  01/26/2023     IR PICC PLACEMENT > 5 YRS OF AGE  6/16/2023     IR RENAL BIOPSY RIGHT  6/20/2023     RETURN LIVER TRANSPLANT N/A 06/06/2023    Procedure: Return liver  transplant. Intra-operative ultrasound;  Surgeon: Chad Clifton MD;  Location: [facility] OR     TRANSPLANT KIDNEY RECIPIENT  DONOR N/A 2023    Procedure: Transplant kidney recipient  donor, ureteral stent placement;  Surgeon: Chad Clifton MD;  Location: [facility] OR     TRANSPLANT LIVER RECIPIENT  DONOR N/A 2023    Procedure: Transplant liver recipient  donor;  Surgeon: Chad Clifton MD;  Location: [facility] OR     Social History     Socioeconomic History     Marital status:      Spouse name: Not on file     Number of children: Not on file     Years of education: Not on file     Highest education level: Not on file   Occupational History     Not on file   Tobacco Use     Smoking status: Never     Passive exposure: Never     Smokeless tobacco: Never   Vaping Use     Vaping Use: Never used   Substance and Sexual Activity     Alcohol use: Not Currently     Comment: Last ETOH use was 2021     Drug use: Not Currently     Sexual activity: Not on file   Other Topics Concern     Parent/sibling w/ CABG, MI or angioplasty before 65F 55M? Not Asked   Social History Narrative     Not on file     Social Determinants of Health     Financial Resource Strain: Not on file   Food Insecurity: Not on file   Transportation Needs: Not on file   Physical Activity: Not on file   Stress: Not on file   Social Connections: Not on file   Intimate Partner Violence: Not on file   Housing Stability: Not on file     Prescription Medications as of 2023         Rx Number Disp Refills Start End Last Dispensed Date Next Fill Date Owning Pharmacy    Alcohol Swabs PADS  100 each 0 2023    [facility] [place], [place] - [address]    Sig: Use to swab the area of the injection or quyen as directed Per insurance coverage    Class: E-Prescribe    allopurinol (ZYLOPRIM) 100 MG tablet  180 tablet 3 2023        Sig: Take 2 tablets (200 mg) by mouth daily    Class:  Historical    Route: Oral    aspirin (ASA) 325 MG tablet  30 tablet 0 2023    [facility] [place], [place] - [address]    Si tablet (325 mg) by Oral or Feeding Tube route daily    Class: E-Prescribe    Route: Oral or Feeding Tube    atorvastatin (LIPITOR) 20 MG tablet  30 tablet 0 2023    [facility] [place], [place] - [address]    Sig: Take 1 tablet (20 mg) by mouth every evening    Class: E-Prescribe    Route: Oral    blood glucose (NO BRAND SPECIFIED) lancets standard  100 each 0 2023    [place], [place] - [address]    Sig: To use to test glucose level in the blood Use to test blood sugar  4  times daily as directed. To accompany glucose monitor brands per insurance coverage.    Class: E-Prescribe    blood glucose (NO BRAND SPECIFIED) test strip  30 strip 3 2023    [place], [place] - [phone]    Sig: Use to test blood sugar 3 times daily or as directed.    Class: E-Prescribe    blood glucose (NO BRAND SPECIFIED) test strip  4 strip 1 2023    [id number] - [place], [place] - [address]    Sig: To use to test glucose level in the blood Use to test blood sugar  4 times daily as directed. To accompany glucose monitor brands per insurance coverage.    Class: E-Prescribe    blood glucose monitoring (NO BRAND SPECIFIED) meter device kit  1 kit 0 2023    [place], [place] - [address]    Sig: Use as directed Per insurance coverage    Class: E-Prescribe    hydrocortisone (CORTAID) 1 % external cream  20 g 0 2023    [place], [place] - [address]    Sig: Apply topically 2 times daily    Class: E-Prescribe    Route: Topical    insulin aspart (NOVOLOG PEN) 100 UNIT/ML pen  15 mL 0 2023    [place], [place]  - [address]    Sig: Inject 1 Units Subcutaneous 3 times daily (with meals) 1 unit of insulin with 12 grams of carb with breakfast / lunch and dinner .    Class: E-Prescribe    Notes to Pharmacy: 1 unit with 12 grams of carbohydrate    Route: Subcutaneous    Renewals       Renewal requests to authorizing provider (Lashay Rachel MD) <b>prohibited</b>            insulin pen needle (32G X 4 MM) 32G X 4 MM miscellaneous  100 each 0 7/25/2023    [facility] [place], [place] - [address]    Sig: Use as directed by provider Per insurance coverage    Class: E-Prescribe    magnesium oxide (MAG-OX) 400 MG tablet  60 tablet 0 8/16/2023    [facility] [address]    Sig: Take 2 tablets (800 mg) by mouth 2 times daily    Class: E-Prescribe    Route: Oral    metoprolol tartrate (LOPRESSOR) 25 MG tablet  60 tablet 0 8/1/2023        Sig: Take 12.5 mg by mouth daily    Class: Historical    Route: Oral    multivitamin w/minerals (THERA-VIT-M) tablet  30 tablet 0 8/16/2023    [facility] [address]    Sig: Take 1 tablet by mouth daily    Class: E-Prescribe    Route: Oral    MYFORTIC (BRAND) 180 MG EC tablet  240 tablet 3 8/2/2023    [facility] [address]    Sig: Take 4 tablets (720 mg) by mouth 2 times daily    Class: E-Prescribe    Notes to Pharmacy: TXP DT 6/6/2023 (Liver), 6/6/2023 (Kidney) TXP Dischg DT 6/25/2023 DX Kidney replaced by transplant Z94.0 [facility] ([place], [place]) [facility]    Route: Oral    ondansetron (ZOFRAN) 4 MG tablet  60 tablet 1 8/1/2023 8/31/2023   [facility] [place], [place] - [phone]    Sig: Take 1 tablet (4 mg) by mouth every 8 hours as needed for nausea    Class: E-Prescribe    Route: Oral    pantoprazole (PROTONIX) 40 MG EC tablet   30 tablet 0 8/16/2023    [facility] [address]    Sig: Take 1 tablet (40 mg) by mouth every morning (before breakfast)    Class: E-Prescribe    Route: Oral    predniSONE (DELTASONE) 10 MG tablet  90 tablet 3 8/17/2023    [facility] [address]    Sig: Take 1 tablet (10 mg) by mouth daily    Class: E-Prescribe    Route: Oral    Sharps Container MISC  1 each 0 7/25/2023    [facility] [address]    Sig: Use as directed to dispose of needles, lancets and other sharps    Class: E-Prescribe    simethicone (MYLICON) 125 MG chewable tablet            Sig: Take 250 mg by mouth 2 times daily    Class: Historical    Route: Oral    sulfamethoxazole-trimethoprim (BACTRIM) 400-80 MG tablet  90 tablet 3 8/17/2023    [facility] [address]    Sig: Take 1 tablet by mouth daily    Class: E-Prescribe    Route: Oral    tacrolimus (GENERIC EQUIVALENT) 1 MG capsule  450 capsule 3 8/11/2023    [facility] [address]    Sig: Take 3 capsules (3 mg) by mouth every morning AND 2 capsules (2 mg) every evening.    Class: E-Prescribe    Notes to Pharmacy: Profile Rx: patient will contact pharmacy when needed    Route: Oral    ursodiol (ACTIGALL) 250 MG tablet  180 tablet 3 8/17/2023    [facility] [address]    Sig: Take 1 tablet (250 mg) by mouth 2 times daily    Class: E-Prescribe    Route: Oral    valGANciclovir (VALCYTE) 450 MG tablet  180 tablet 0 8/17/2023    [facility] [address]    Sig: Take 1 tablet (450 mg) by mouth daily    Class: E-Prescribe    Notes to Pharmacy: Profile Rx: patient will contact pharmacy when needed    Route: Oral    warfarin ANTICOAGULANT (COUMADIN) 1 MG tablet  30 tablet 0 8/16/2023     [facility] - [place], [place] - [address]    Sig: Take 1 tablet (1 mg) by mouth daily    Class: E-Prescribe    Route: Oral          Patient has no known allergies.   REVIEW OF SYSTEMS (check box if normal)  [x]               GENERAL  [x]                 PULMONARY [x]                GENITOURINARY  [x]                CNS                 [x]                 CARDIAC  [x]                 ENDOCRINE  [x]                EARS,NOSE,THROAT [x]                 GASTROINTESTINAL [x]                 NEUROLOGIC    [x]                MUSCLOSKELTAL  [x]                  HEMATOLOGY      PHYSICAL EXAM (check box if normal)/70   Pulse 111   Wt 101.4 kg (223 lb 9.6 oz)   SpO2 100%   BMI 35.02 kg/m          [x]            GENERAL:    [x]       EYES:  ICTERIC   []        YES  []                    NO  [x]           EXTREMITIES: Clubbing []       Y     [x]           N    [x]           EARS, NOSE, THROAT: Membranes Moist    YES   [x]                   NO []                  [x]           LUNGS:  CLEAR    YES       [x]                  NO    []                                [x]           SKIN: Jaundice           YES       []                  NO    [x]                   Rash: YES       []                  NO    [x]                                     [x]             HEART: Regular Rate          YES       [x]                  NO    []                   Incision Clean:  YES       [x]                  NO    []                                [x]                    ABDOMEN: Wound size 3 x 4 cm Organomegaly YES       []                  NO    [x]                       [x]                    NEUROLOGICAL:  Nonfocal  YES       [x]                  NO    []                       [x]                    Hernia YES       []                  NO    [x]                   PSYCHIATRIC:  Appropriate  YES       [x]                  NO    []                       OTHER:                                                                                                    PAIN SCALE:: 4       Again, thank you for allowing me to participate in the care of your patient.        Sincerely,        Chad Clifton MD

## 2023-08-21 NOTE — PROGRESS NOTES
Transplant Surgery -OUTPATIENT IMMUNOSUPPRESSION PROGRESS NOTE    Date of Visit: 08/21/2023    Transplants:  6/6/2023 (Liver), 6/6/2023 (Kidney); Postoperative day:  76 (Liver), 76 (Kidney)  ASSESMENT AND PLAN:  1.Graft Function:Liver allograft: no rejection or technical problems.  Kidney allograft: no rejection or technical problems;     2.Immunosuppression Management:Keep tacrolimus levels at 8-10 ng/dL  3.Hypertension:ok  4.Renal Function:better  5.Lab frequency:weekly  6.Other:  Needs to see a cardiologist  Needs to see anemia clinic  Continue physical therapy  Wound healthy    Date: August 21, 2023    Transplant:  [x]                             Liver [x]                              Kidney []                             Pancreas []                              Other:             Chief Complaint:Post-op Visit (LIVER /KIDNEY  )    Doing well   History of Present Illness:  Patient Active Problem List   Diagnosis    Psoriatic arthritis (H)    PAF (paroxysmal atrial fibrillation) (H)    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)    HTN, kidney transplant related    Hereditary hemochromatosis (H)    Other hyperlipidemia    Tophaceous gout    Deep vein thrombosis (DVT) of non-extremity vein, unspecified chronicity    CKD (chronic kidney disease), stage II    Chronic atrial fibrillation (H)    Liver replaced by transplant (H)    Immunosuppressed status (H)    Kidney replaced by transplant    CAD (coronary artery disease)    Steroid-induced hyperglycemia    Muscular deconditioning    Aftercare following organ transplant    Anemia in other chronic diseases classified elsewhere     SOCIAL /FAMILY HISTORY: [x]                  No recent change    Past Medical History:   Diagnosis Date    Alcoholic cirrhosis of liver with ascites (H) 10/11/2019    ANCA-associated vasculitis (H) 2022    C. difficile colitis     Coronary artery disease     [facility] 4/2023 - complete occlusion of  RCA    ESRD (end stage renal disease) on dialysis (H)     Gout     History of hemochromatosis 10/11/2019    Hypertension     IgA nephropathy     Obesity     PAF (paroxysmal atrial fibrillation) (H)     Pre-diabetes     Psoriatic arthritis (H)     Psoriatic arthritis (H)     RA (rheumatoid arthritis) (H)     Status post kidney transplant 2023    Induction with thymoglobulin 4mg/kg, + DSA CW9    Status post liver transplantation (H) 2023     Past Surgical History:   Procedure Laterality Date    APPENDECTOMY      Removed at 18 Years Old     BENCH KIDNEY  2023    Procedure: Bench kidney;  Surgeon: Chad Clifton MD;  Location:  OR    BENCH LIVER  2023    Procedure: Bench liver;  Surgeon: Chad Clifton MD;  Location:  OR    COLONOSCOPY      2014 at [facility]     CV CORONARY ANGIOGRAM N/A 2023    Procedure: Coronary Angiogram;  Surgeon: Pablo Araujo MD;  Location:  HEART CARDIAC CATH LAB    EYE SURGERY Bilateral     Cataract    H STATISTIC PICC LINE INSERTION >5YR, FAILED Left 2023    Unable to advance catheter over the axillary area    HERNIA REPAIR      History of bilateral inguinal hernia repair: 10/28/2014. Open hernia repair: 10/2017. Abdominal wound exploration and debridement 2017    INSERT SHUNT PORTAL TRANSJUGULAR INTRAHEPTIC  2022    IR CVC NON TUNNEL LINE REMOVAL  7/3/2023    IR CVC NON TUNNEL PLACEMENT > 5 YRS  2023    IR CVC TUNNEL PLACEMENT > 5 YRS OF AGE  2023    IR PICC PLACEMENT > 5 YRS OF AGE  2023    IR RENAL BIOPSY RIGHT  2023    RETURN LIVER TRANSPLANT N/A 2023    Procedure: Return liver transplant. Intra-operative ultrasound;  Surgeon: Chad Clifton MD;  Location: [facility] OR    TRANSPLANT KIDNEY RECIPIENT  DONOR N/A 2023    Procedure: Transplant kidney recipient  donor, ureteral stent placement;  Surgeon: Chad Clifton MD;  Location:  OR     TRANSPLANT LIVER RECIPIENT  DONOR N/A 2023    Procedure: Transplant liver recipient  donor;  Surgeon: Chad Clifton MD;  Location:  OR     Social History     Socioeconomic History    Marital status:      Spouse name: Not on file    Number of children: Not on file    Years of education: Not on file    Highest education level: Not on file   Occupational History    Not on file   Tobacco Use    Smoking status: Never     Passive exposure: Never    Smokeless tobacco: Never   Vaping Use    Vaping Use: Never used   Substance and Sexual Activity    Alcohol use: Not Currently     Comment: Last ETOH use was 2021    Drug use: Not Currently    Sexual activity: Not on file   Other Topics Concern    Parent/sibling w/ CABG, MI or angioplasty before 65F 55M? Not Asked   Social History Narrative    Not on file     Social Determinants of Health     Financial Resource Strain: Not on file   Food Insecurity: Not on file   Transportation Needs: Not on file   Physical Activity: Not on file   Stress: Not on file   Social Connections: Not on file   Intimate Partner Violence: Not on file   Housing Stability: Not on file     Prescription Medications as of 2023         Rx Number Disp Refills Start End Last Dispensed Date Next Fill Date Owning Pharmacy    Alcohol Swabs PADS  100 each 0 2023    [facility] [place], [place] - [address]    Sig: Use to swab the area of the injection or quyen as directed Per insurance coverage    Class: E-Prescribe    allopurinol (ZYLOPRIM) 100 MG tablet  180 tablet 3 2023        Sig: Take 2 tablets (200 mg) by mouth daily    Class: Historical    Route: Oral    aspirin (ASA) 325 MG tablet  30 tablet 0 2023    [facility] [place], [place] - [address]    Si tablet (325 mg) by Oral or Feeding Tube route daily    Class: E-Prescribe    Route: Oral or Feeding Tube    atorvastatin (LIPITOR) 20 MG tablet   30 tablet 0 8/16/2023    [facility] [place], [place] - [address]    Sig: Take 1 tablet (20 mg) by mouth every evening    Class: E-Prescribe    Route: Oral    blood glucose (NO BRAND SPECIFIED) lancets standard  100 each 0 7/25/2023    [facility] [address]    Sig: To use to test glucose level in the blood Use to test blood sugar  4  times daily as directed. To accompany glucose monitor brands per insurance coverage.    Class: E-Prescribe    blood glucose (NO BRAND SPECIFIED) test strip  30 strip 3 8/7/2023    [place], [place] - [phone]    Sig: Use to test blood sugar 3 times daily or as directed.    Class: E-Prescribe    blood glucose (NO BRAND SPECIFIED) test strip  4 strip 1 8/4/2023    [id number] [place] [address]    Sig: To use to test glucose level in the blood Use to test blood sugar  4 times daily as directed. To accompany glucose monitor brands per insurance coverage.    Class: E-Prescribe    blood glucose monitoring (NO BRAND SPECIFIED) meter device kit  1 kit 0 7/25/2023    [facility] [address]    Sig: Use as directed Per insurance coverage    Class: E-Prescribe    hydrocortisone (CORTAID) 1 % external cream  20 g 0 7/25/2023    [facility] [address]    Sig: Apply topically 2 times daily    Class: E-Prescribe    Route: Topical    insulin aspart (NOVOLOG PEN) 100 UNIT/ML pen  15 mL 0 7/25/2023    [facility] [address]    Sig: Inject 1 Units Subcutaneous 3 times daily (with meals) 1 unit of insulin with 12 grams of carb with breakfast / lunch and dinner .    Class: E-Prescribe    Notes to Pharmacy: 1 unit with 12 grams of carbohydrate    Route: Subcutaneous    Renewals       Renewal requests to  authorizing provider (Lashay Rachel MD) <b>prohibited</b>            insulin pen needle (32G X 4 MM) 32G X 4 MM miscellaneous  100 each 0 7/25/2023    [facility] [address]    Sig: Use as directed by provider Per insurance coverage    Class: E-Prescribe    magnesium oxide (MAG-OX) 400 MG tablet  60 tablet 0 8/16/2023    [facility]/[place] [address]    Sig: Take 2 tablets (800 mg) by mouth 2 times daily    Class: E-Prescribe    Route: Oral    metoprolol tartrate (LOPRESSOR) 25 MG tablet  60 tablet 0 8/1/2023        Sig: Take 12.5 mg by mouth daily    Class: Historical    Route: Oral    multivitamin w/minerals (THERA-VIT-M) tablet  30 tablet 0 8/16/2023    [facility] [address]    Sig: Take 1 tablet by mouth daily    Class: E-Prescribe    Route: Oral    MYFORTIC (BRAND) 180 MG EC tablet  240 tablet 3 8/2/2023    [facility] [address]    Sig: Take 4 tablets (720 mg) by mouth 2 times daily    Class: E-Prescribe    Notes to Pharmacy: TXP DT 6/6/2023 (Liver), 6/6/2023 (Kidney) TXP Dischg DT 6/25/2023 DX Kidney replaced by transplant Z94.0 [facility] Center [facility] ([place], [place]) [facility]    Route: Oral    ondansetron (ZOFRAN) 4 MG tablet  60 tablet 1 8/1/2023 8/31/2023   [facility] [place] [phone]    Sig: Take 1 tablet (4 mg) by mouth every 8 hours as needed for nausea    Class: E-Prescribe    Route: Oral    pantoprazole (PROTONIX) 40 MG EC tablet  30 tablet 0 8/16/2023    [facility] [address]    Sig: Take 1 tablet (40 mg) by mouth every morning (before breakfast)    Class: E-Prescribe    Route: Oral    predniSONE (DELTASONE) 10 MG tablet  90 tablet 3 8/17/2023    [facility] [place] [address]    Sig: Take 1 tablet (10 mg) by mouth daily    Class: E-Prescribe    Route: Oral    Sharps Container MISC  1 each 0 7/25/2023    [facility] [address]    Sig: Use as directed to dispose of needles, lancets and other sharps    Class: E-Prescribe    simethicone (MYLICON) 125 MG chewable tablet            Sig: Take 250 mg by mouth 2 times daily    Class: Historical    Route: Oral    sulfamethoxazole-trimethoprim (BACTRIM) 400-80 MG tablet  90 tablet 3 8/17/2023    [facility] [place] [address] [address]    Sig: Take 1 tablet by mouth daily    Class: E-Prescribe    Route: Oral    tacrolimus (GENERIC EQUIVALENT) 1 MG capsule  450 capsule 3 8/11/2023    [facility] [place] [address] [address]    Sig: Take 3 capsules (3 mg) by mouth every morning AND 2 capsules (2 mg) every evening.    Class: E-Prescribe    Notes to Pharmacy: Profile Rx: patient will contact pharmacy when needed    Route: Oral    ursodiol (ACTIGALL) 250 MG tablet  180 tablet 3 8/17/2023    [facility] [place] [address] [address]    Sig: Take 1 tablet (250 mg) by mouth 2 times daily    Class: E-Prescribe    Route: Oral    valGANciclovir (VALCYTE) 450 MG tablet  180 tablet 0 8/17/2023    [facility] [place] [address]    Sig: Take 1 tablet (450 mg) by mouth daily    Class: E-Prescribe    Notes to Pharmacy: Profile Rx: patient will contact pharmacy when needed    Route: Oral    warfarin ANTICOAGULANT (COUMADIN) 1 MG tablet  30 tablet 0 8/16/2023    [facility] [place] [address] [address]    Sig: Take 1 tablet (1 mg) by mouth daily    Class: E-Prescribe    Route: Oral          Patient has no known allergies.   REVIEW OF SYSTEMS (check box if normal)  [x]               GENERAL  [x]                 PULMONARY [x]                 GENITOURINARY  [x]                CNS                 [x]                 CARDIAC  [x]                 ENDOCRINE  [x]                EARS,NOSE,THROAT [x]                 GASTROINTESTINAL [x]                 NEUROLOGIC    [x]                MUSCLOSKELTAL  [x]                  HEMATOLOGY      PHYSICAL EXAM (check box if normal)/70   Pulse 111   Wt 101.4 kg (223 lb 9.6 oz)   SpO2 100%   BMI 35.02 kg/m          [x]            GENERAL:    [x]       EYES:  ICTERIC   []        YES  []                    NO  [x]           EXTREMITIES: Clubbing []       Y     [x]           N    [x]           EARS, NOSE, THROAT: Membranes Moist    YES   [x]                   NO []                  [x]           LUNGS:  CLEAR    YES       [x]                  NO    []                                [x]           SKIN: Jaundice           YES       []                  NO    [x]                   Rash: YES       []                  NO    [x]                                     [x]             HEART: Regular Rate          YES       [x]                  NO    []                   Incision Clean:  YES       [x]                  NO    []                                [x]                    ABDOMEN: Wound size 3 x 4 cm Organomegaly YES       []                  NO    [x]                       [x]                    NEUROLOGICAL:  Nonfocal  YES       [x]                  NO    []                       [x]                    Hernia YES       []                  NO    [x]                   PSYCHIATRIC:  Appropriate  YES       [x]                  NO    []                       OTHER:                                                                                                   PAIN SCALE:: 4

## 2023-08-22 ENCOUNTER — OFFICE VISIT (OUTPATIENT)
Dept: CARDIOLOGY | Facility: CLINIC | Age: 68
End: 2023-08-22
Payer: COMMERCIAL

## 2023-08-22 ENCOUNTER — DOCUMENTATION ONLY (OUTPATIENT)
Dept: ANTICOAGULATION | Facility: CLINIC | Age: 68
End: 2023-08-22

## 2023-08-22 ENCOUNTER — TELEPHONE (OUTPATIENT)
Dept: TRANSPLANT | Facility: CLINIC | Age: 68
End: 2023-08-22

## 2023-08-22 VITALS
DIASTOLIC BLOOD PRESSURE: 80 MMHG | WEIGHT: 222.8 LBS | HEART RATE: 100 BPM | RESPIRATION RATE: 16 BRPM | SYSTOLIC BLOOD PRESSURE: 124 MMHG | BODY MASS INDEX: 34.97 KG/M2 | HEIGHT: 67 IN

## 2023-08-22 DIAGNOSIS — I48.21 PERMANENT ATRIAL FIBRILLATION WITH RAPID VENTRICULAR RESPONSE (H): Primary | ICD-10-CM

## 2023-08-22 DIAGNOSIS — I25.118 CORONARY ARTERY DISEASE OF NATIVE ARTERY OF NATIVE HEART WITH STABLE ANGINA PECTORIS (H): ICD-10-CM

## 2023-08-22 DIAGNOSIS — K70.31 ALCOHOLIC CIRRHOSIS OF LIVER WITH ASCITES (H): ICD-10-CM

## 2023-08-22 DIAGNOSIS — Z94.4 LIVER REPLACED BY TRANSPLANT (H): ICD-10-CM

## 2023-08-22 DIAGNOSIS — I48.20 CHRONIC ATRIAL FIBRILLATION (H): Primary | ICD-10-CM

## 2023-08-22 DIAGNOSIS — Z94.0 S/P KIDNEY TRANSPLANT: ICD-10-CM

## 2023-08-22 LAB
BKV DNA # SPEC NAA+PROBE: NOT DETECTED COPIES/ML
MYCOPHENOLATE SERPL LC/MS/MS-MCNC: 3.04 MG/L (ref 1–3.5)
MYCOPHENOLATE-G SERPL LC/MS/MS-MCNC: 80.4 MG/L (ref 30–95)
TME LAST DOSE: NORMAL H
TME LAST DOSE: NORMAL H

## 2023-08-22 PROCEDURE — 99204 OFFICE O/P NEW MOD 45 MIN: CPT | Performed by: INTERNAL MEDICINE

## 2023-08-22 RX ORDER — METOPROLOL TARTRATE 25 MG/1
12.5 TABLET, FILM COATED ORAL 2 TIMES DAILY
Qty: 180 TABLET | Refills: 3 | Status: SHIPPED | OUTPATIENT
Start: 2023-08-22 | End: 2023-09-15

## 2023-08-22 NOTE — PROGRESS NOTES
[facility]   [address]  [place] [address]   [email or web address]   OFFICE: [phone]     CARDIOLOGY CLINIC NOTE     Thank you, Gary Do, for asking the [facility] team to see Mr. Damion Quinones to  Consult (CAD, afib)        Assessment/Recommendations   Assessment:    Coronary artery disease with  of RCA and preserved LVEF - class I-II angina. Stable.  Permanent atrial fibrillation - currently with uncontrolled HR. Has recently had metoprolol decreased due to lower blood pressure. On warfarin for stroke prevention but INR has been difficult to keep in range.  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  H/o DVT, likely provoked.    Plan:  Stop warfarin  Eliquis 5 mg BID  Recommend stopping aspirin, but instructed Casey to check with transplant team before discontinuing.  Holter monitor in 2 weeks to check HR control.  Follow up with me in 3 months.  Consider [facility] referral.         History of Present Illness   Mr. Damion Quinones is a 67 year old male with a significant past history of coronary artery disease, atrial fibrillation, alcoholic cirrhosis c/b hepatic encephalopathy, ESRD on HD status post combined liver/kidney transplant who presents to establish cardiac care.     was identified to have severe single vessel CAD with  of his RCA in April, 2023 as part of his pre-transplant evaluation. He also has atrial fibrillation which has been present at least since 2016 and has been persistent for the past several years. He is currently on warfarin for stroke prevention as well as to treat chronic DVTs. He is now s/p combined liver/kidney transplant on 6/6/23.     Currently, Casey continues to improve from a symptom standpoint. He feels much better than he has for years prior to his transplant. His HR has been running high, and his metoprolol dosing has been decreasing due  to low blood pressures since his transplant. He does not feel his tachycardia.     Other than noted above, Mr. Quinones denies any chest pain/pressure/tightness, shortness of breath at rest or with exertion, light headedness/dizziness, pre-syncope, syncope, lower extremity swelling, palpitations, paroxysmal nocturnal dyspnea (PND), or orthopnea.     Cardiac Problems and Cardiac Diagnostics     Most Recent Cardiac testing:  ECG dated 4/3/23 (personaly reviewed and interpreted): atrial fibrillation, HR 78 bpm.    ECHO (report reviewed):   TTE 6/5/23  Interpretation Summary  Technically difficult study. The patient's rhythm is atrial fibrillation.  Global and regional left ventricular function is normal with an EF of 55-60%.  Global right ventricular function is normal.  No significant valvular abnormalities present.  IVC diameter <2.1 cm collapsing >50% with sniff suggests a normal RA pressure  of 3 mmHg.  No pericardial effusion is present.  No significant changes noted.    Cardiac cath: from 4/27/23 demonstrated   Left Main   The vessel is moderate in size and is angiographically normal.      Left Anterior Descending   The vessel is moderate in size.   Prox LAD lesion is 30% stenosed.      First Diagonal Branch   The vessel is moderate in size.   1st Diag lesion is 50% stenosed.      First Septal Branch   The vessel is small.      Second Diagonal Branch   The vessel is small.   2nd Diag lesion is 50% stenosed.      Second Septal Branch   The vessel is small.      Third Diagonal Branch   The vessel is small and is angiographically normal.      Left Circumflex   The vessel is moderate in size and is angiographically normal.      First Obtuse Marginal Branch   The vessel is large and is angiographically normal.      Lateral First Obtuse Marginal Branch   The vessel is moderate in size.      Second Obtuse Marginal Branch   The vessel is small and is angiographically normal.      Right Coronary Artery   The vessel is  moderate in size.   Ost RCA to Mid RCA lesion is 50% stenosed.   Mid RCA lesion is 100% stenosed.   Mid RCA to Dist RCA lesion is 50% stenosed.      Right Posterior Descending Artery   The vessel is small.   Collaterals   RPDA filled by collaterals from Dist LAD.         Right Posterior Atrioventricular Artery   The vessel is small and is angiographically normal.   Collaterals   RPAV filled by collaterals from Dist Cx.         First Right Posterolateral Branch   The vessel is small and is angiographically normal.      Second Right Posterolateral Branch   The vessel is small and is angiographically normal.                  Medications  Allergies   Current Outpatient Medications   Medication Sig Dispense Refill     Alcohol Swabs PADS Use to swab the area of the injection or quyen as directed Per insurance coverage 100 each 0     allopurinol (ZYLOPRIM) 100 MG tablet Take 2 tablets (200 mg) by mouth daily 180 tablet 3     apixaban ANTICOAGULANT (ELIQUIS ANTICOAGULANT) 5 MG tablet Take 1 tablet (5 mg) by mouth 2 times daily 180 tablet 3     atorvastatin (LIPITOR) 20 MG tablet Take 1 tablet (20 mg) by mouth every evening 30 tablet 0     blood glucose (NO BRAND SPECIFIED) lancets standard To use to test glucose level in the blood Use to test blood sugar  4  times daily as directed. To accompany glucose monitor brands per insurance coverage. 100 each 0     blood glucose (NO BRAND SPECIFIED) test strip Use to test blood sugar 3 times daily or as directed. 30 strip 3     blood glucose (NO BRAND SPECIFIED) test strip To use to test glucose level in the blood Use to test blood sugar  4 times daily as directed. To accompany glucose monitor brands per insurance coverage. 4 strip 1     blood glucose monitoring (NO BRAND SPECIFIED) meter device kit Use as directed Per insurance coverage 1 kit 0     hydrocortisone (CORTAID) 1 % external cream Apply topically 2 times daily 20 g 0     insulin pen needle (32G X 4 MM) 32G X 4 MM  "miscellaneous Use as directed by provider Per insurance coverage 100 each 0     magnesium oxide (MAG-OX) 400 MG tablet Take 2 tablets (800 mg) by mouth 2 times daily 60 tablet 0     metoprolol tartrate (LOPRESSOR) 25 MG tablet Take 0.5 tablets (12.5 mg) by mouth 2 times daily 180 tablet 3     multivitamin w/minerals (THERA-VIT-M) tablet Take 1 tablet by mouth daily 30 tablet 0     MYFORTIC (BRAND) 180 MG EC tablet Take 4 tablets (720 mg) by mouth 2 times daily 240 tablet 3     ondansetron (ZOFRAN) 4 MG tablet Take 1 tablet (4 mg) by mouth every 8 hours as needed for nausea 60 tablet 1     pantoprazole (PROTONIX) 40 MG EC tablet Take 1 tablet (40 mg) by mouth every morning (before breakfast) 30 tablet 0     predniSONE (DELTASONE) 10 MG tablet Take 1 tablet (10 mg) by mouth daily 90 tablet 3     Sharps Container MISC Use as directed to dispose of needles, lancets and other sharps 1 each 0     simethicone (MYLICON) 125 MG chewable tablet Take 250 mg by mouth 2 times daily       sulfamethoxazole-trimethoprim (BACTRIM) 400-80 MG tablet Take 1 tablet by mouth daily 90 tablet 3     tacrolimus (GENERIC EQUIVALENT) 1 MG capsule Take 3 capsules (3 mg) by mouth every morning AND 2 capsules (2 mg) every evening. 450 capsule 3     ursodiol (ACTIGALL) 250 MG tablet Take 1 tablet (250 mg) by mouth 2 times daily 180 tablet 3     valGANciclovir (VALCYTE) 450 MG tablet Take 1 tablet (450 mg) by mouth daily 180 tablet 0     insulin aspart (NOVOLOG PEN) 100 UNIT/ML pen Inject 1 Units Subcutaneous 3 times daily (with meals) 1 unit of insulin with 12 grams of carb with breakfast / lunch and dinner . (Patient not taking: Reported on 8/22/2023) 15 mL 0      No Known Allergies     Physical Examination Review of Systems   Vitals: /80 (BP Location: Right arm, Patient Position: Sitting, Cuff Size: Adult Large)   Pulse 100   Resp 16   Ht 1.702 m (5' 7\")   Wt 101.1 kg (222 lb 12.8 oz)   BMI 34.90 kg/m    BMI= Body mass index is 34.9 " kg/m .  Wt Readings from Last 3 Encounters:   08/22/23 101.1 kg (222 lb 12.8 oz)   08/21/23 101.4 kg (223 lb 9.6 oz)   08/11/23 101.4 kg (223 lb 8 oz)       General: pleasant male. No acute distress.   HENT: external ears normal. Nares patent. Mucous membranes moist.  Eyes: perrla, extraocular muscles intact. No scleral icterus.   Neck: No JVD  Lungs: clear to auscultation  COR: mildly fast rate, irregularly irregular rhythm, No murmurs, rubs, or gallops  Abd: nondistended, BS present  Extrem: No edema        Please refer above for cardiac ROS details.       Past History   Past Medical History:   Past Medical History:   Diagnosis Date     Alcoholic cirrhosis of liver with ascites (H) 10/11/2019     ANCA-associated vasculitis (H) 2022     C. difficile colitis      Coronary artery disease     [facility] 4/2023 - complete occlusion of RCA     ESRD (end stage renal disease) on dialysis (H)      Gout      History of hemochromatosis 10/11/2019     Hypertension      IgA nephropathy      Obesity      PAF (paroxysmal atrial fibrillation) (H)      Pre-diabetes 2023     Psoriatic arthritis (H)      Psoriatic arthritis (H)      RA (rheumatoid arthritis) (H)      Status post kidney transplant 06/06/2023    Induction with thymoglobulin 4mg/kg, + DSA CW9     Status post liver transplantation (H) 06/06/2023        Past Surgical History:   Past Surgical History:   Procedure Laterality Date     APPENDECTOMY      Removed at 18 Years Old      BENCH KIDNEY  06/06/2023    Procedure: Bench kidney;  Surgeon: Chad Clifton MD;  Location:  OR     BENCH LIVER  06/06/2023    Procedure: Bench liver;  Surgeon: Chad Clifton MD;  Location:  OR     COLONOSCOPY      2014 at [facility]      CV CORONARY ANGIOGRAM N/A 04/27/2023    Procedure: Coronary Angiogram;  Surgeon: Pablo Araujo MD;  Location:  HEART CARDIAC CATH LAB     EYE SURGERY Bilateral     Cataract     H STATISTIC PICC LINE INSERTION >5YR, FAILED Left  2023    Unable to advance catheter over the axillary area     HERNIA REPAIR      History of bilateral inguinal hernia repair: 10/28/2014. Open hernia repair: 10/2017. Abdominal wound exploration and debridement 2017     INSERT SHUNT PORTAL TRANSJUGULAR INTRAHEPTIC  2022     IR CVC NON TUNNEL LINE REMOVAL  7/3/2023     IR CVC NON TUNNEL PLACEMENT > 5 YRS  2023     IR CVC TUNNEL PLACEMENT > 5 YRS OF AGE  2023     IR PICC PLACEMENT > 5 YRS OF AGE  2023     IR RENAL BIOPSY RIGHT  2023     RETURN LIVER TRANSPLANT N/A 2023    Procedure: Return liver transplant. Intra-operative ultrasound;  Surgeon: Chad Clifton MD;  Location: [facility] OR     TRANSPLANT KIDNEY RECIPIENT  DONOR N/A 2023    Procedure: Transplant kidney recipient  donor, ureteral stent placement;  Surgeon: Chad Clifton MD;  Location: [facility] OR     TRANSPLANT LIVER RECIPIENT  DONOR N/A 2023    Procedure: Transplant liver recipient  donor;  Surgeon: Chad Clifton MD;  Location: [facility] OR        Family History:   Family History   Problem Relation Age of Onset     Lung Cancer Mother      Colon Cancer Father      Lung Cancer Brother      Heart Failure Brother      Kidney Disease Brother         Social History:   Social History     Socioeconomic History     Marital status:      Spouse name: Not on file     Number of children: Not on file     Years of education: Not on file     Highest education level: Not on file   Occupational History     Not on file   Tobacco Use     Smoking status: Never     Passive exposure: Never     Smokeless tobacco: Never   Vaping Use     Vaping Use: Never used   Substance and Sexual Activity     Alcohol use: Not Currently     Comment: Last ETOH use was 2021     Drug use: Not Currently     Sexual activity: Not on file   Other Topics Concern     Parent/sibling w/ CABG, MI or angioplasty before 65F 55M? Not Asked   Social History  Narrative     Not on file     Social Determinants of Health     Financial Resource Strain: Not on file   Food Insecurity: Not on file   Transportation Needs: Not on file   Physical Activity: Not on file   Stress: Not on file   Social Connections: Not on file   Intimate Partner Violence: Not on file   Housing Stability: Not on file            Lab Results    Chemistry/lipid CBC Cardiac Enzymes/BNP/TSH/INR   Lab Results   Component Value Date    CHOL 285 (H) 11/22/2022    HDL 79 11/22/2022    TRIG 96 11/22/2022    BUN 15.3 08/21/2023     08/21/2023    CO2 24 08/21/2023    Lab Results   Component Value Date    WBC 2.6 (L) 08/21/2023    WBC 2.6 (L) 08/21/2023    HGB 8.2 (L) 08/21/2023    HCT 28.5 (L) 08/21/2023    MCV 93 08/21/2023     08/21/2023    Lab Results   Component Value Date    TSH 3.37 04/07/2023    INR 1.22 (H) 08/17/2023

## 2023-08-22 NOTE — LETTER
8/22/2023    Gary Serrano MD  [address] [facility] [address]    RE: Damion Quinones       Dear Colleague,     I had the pleasure of seeing Damion Quinones in the [facility].    [facility]   [address]  [place] [address]   [email or web address]   OFFICE: [phone]     CARDIOLOGY CLINIC NOTE     Thank you, Dr. Serrano, Gary BROWN, for asking the [facility] team to see Mr. Damion Quinones to  Consult (CAD, afib)        Assessment/Recommendations   Assessment:    Coronary artery disease with  of RCA and preserved LVEF - class I-II angina. Stable.  Permanent atrial fibrillation - currently with uncontrolled HR. Has recently had metoprolol decreased due to lower blood pressure. On warfarin for stroke prevention but INR has been difficult to keep in range.  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  H/o DVT, likely provoked.    Plan:  Stop warfarin  Eliquis 5 mg BID  Recommend stopping aspirin, but instructed Casey to check with transplant team before discontinuing.  Holter monitor in 2 weeks to check HR control.  Follow up with me in 3 months.  Consider [facility] referral.         History of Present Illness   Mr. Damion Quinones is a 67 year old male with a significant past history of coronary artery disease, atrial fibrillation, alcoholic cirrhosis c/b hepatic encephalopathy, ESRD on HD status post combined liver/kidney transplant who presents to establish cardiac care.     was identified to have severe single vessel CAD with  of his RCA in April, 2023 as part of his pre-transplant evaluation. He also has atrial fibrillation which has been present at least since 2016 and has been persistent for the past several years. He is currently on warfarin for stroke prevention as well as to treat chronic DVTs. He is now s/p combined liver/kidney transplant on 6/6/23.      Currently, Casey continues to improve from a symptom standpoint. He feels much better than he has for years prior to his transplant. His HR has been running high, and his metoprolol dosing has been decreasing due to low blood pressures since his transplant. He does not feel his tachycardia.     Other than noted above, Mr. Quinones denies any chest pain/pressure/tightness, shortness of breath at rest or with exertion, light headedness/dizziness, pre-syncope, syncope, lower extremity swelling, palpitations, paroxysmal nocturnal dyspnea (PND), or orthopnea.     Cardiac Problems and Cardiac Diagnostics     Most Recent Cardiac testing:  ECG dated 4/3/23 (personaly reviewed and interpreted): atrial fibrillation, HR 78 bpm.    ECHO (report reviewed):   TTE 6/5/23  Interpretation Summary  Technically difficult study. The patient's rhythm is atrial fibrillation.  Global and regional left ventricular function is normal with an EF of 55-60%.  Global right ventricular function is normal.  No significant valvular abnormalities present.  IVC diameter <2.1 cm collapsing >50% with sniff suggests a normal RA pressure  of 3 mmHg.  No pericardial effusion is present.  No significant changes noted.    Cardiac cath: from 4/27/23 demonstrated   Left Main   The vessel is moderate in size and is angiographically normal.      Left Anterior Descending   The vessel is moderate in size.   Prox LAD lesion is 30% stenosed.      First Diagonal Branch   The vessel is moderate in size.   1st Diag lesion is 50% stenosed.      First Septal Branch   The vessel is small.      Second Diagonal Branch   The vessel is small.   2nd Diag lesion is 50% stenosed.      Second Septal Branch   The vessel is small.      Third Diagonal Branch   The vessel is small and is angiographically normal.      Left Circumflex   The vessel is moderate in size and is angiographically normal.      First Obtuse Marginal Branch   The vessel is large and is angiographically normal.       Lateral First Obtuse Marginal Branch   The vessel is moderate in size.      Second Obtuse Marginal Branch   The vessel is small and is angiographically normal.      Right Coronary Artery   The vessel is moderate in size.   Ost RCA to Mid RCA lesion is 50% stenosed.   Mid RCA lesion is 100% stenosed.   Mid RCA to Dist RCA lesion is 50% stenosed.      Right Posterior Descending Artery   The vessel is small.   Collaterals   RPDA filled by collaterals from Dist LAD.         Right Posterior Atrioventricular Artery   The vessel is small and is angiographically normal.   Collaterals   RPAV filled by collaterals from Dist Cx.         First Right Posterolateral Branch   The vessel is small and is angiographically normal.      Second Right Posterolateral Branch   The vessel is small and is angiographically normal.                  Medications  Allergies   Current Outpatient Medications   Medication Sig Dispense Refill    Alcohol Swabs PADS Use to swab the area of the injection or quyen as directed Per insurance coverage 100 each 0    allopurinol (ZYLOPRIM) 100 MG tablet Take 2 tablets (200 mg) by mouth daily 180 tablet 3    apixaban ANTICOAGULANT (ELIQUIS ANTICOAGULANT) 5 MG tablet Take 1 tablet (5 mg) by mouth 2 times daily 180 tablet 3    atorvastatin (LIPITOR) 20 MG tablet Take 1 tablet (20 mg) by mouth every evening 30 tablet 0    blood glucose (NO BRAND SPECIFIED) lancets standard To use to test glucose level in the blood Use to test blood sugar  4  times daily as directed. To accompany glucose monitor brands per insurance coverage. 100 each 0    blood glucose (NO BRAND SPECIFIED) test strip Use to test blood sugar 3 times daily or as directed. 30 strip 3    blood glucose (NO BRAND SPECIFIED) test strip To use to test glucose level in the blood Use to test blood sugar  4 times daily as directed. To accompany glucose monitor brands per insurance coverage. 4 strip 1    blood glucose monitoring (NO BRAND SPECIFIED)  meter device kit Use as directed Per insurance coverage 1 kit 0    hydrocortisone (CORTAID) 1 % external cream Apply topically 2 times daily 20 g 0    insulin pen needle (32G X 4 MM) 32G X 4 MM miscellaneous Use as directed by provider Per insurance coverage 100 each 0    magnesium oxide (MAG-OX) 400 MG tablet Take 2 tablets (800 mg) by mouth 2 times daily 60 tablet 0    metoprolol tartrate (LOPRESSOR) 25 MG tablet Take 0.5 tablets (12.5 mg) by mouth 2 times daily 180 tablet 3    multivitamin w/minerals (THERA-VIT-M) tablet Take 1 tablet by mouth daily 30 tablet 0    MYFORTIC (BRAND) 180 MG EC tablet Take 4 tablets (720 mg) by mouth 2 times daily 240 tablet 3    ondansetron (ZOFRAN) 4 MG tablet Take 1 tablet (4 mg) by mouth every 8 hours as needed for nausea 60 tablet 1    pantoprazole (PROTONIX) 40 MG EC tablet Take 1 tablet (40 mg) by mouth every morning (before breakfast) 30 tablet 0    predniSONE (DELTASONE) 10 MG tablet Take 1 tablet (10 mg) by mouth daily 90 tablet 3    Sharps Container MISC Use as directed to dispose of needles, lancets and other sharps 1 each 0    simethicone (MYLICON) 125 MG chewable tablet Take 250 mg by mouth 2 times daily      sulfamethoxazole-trimethoprim (BACTRIM) 400-80 MG tablet Take 1 tablet by mouth daily 90 tablet 3    tacrolimus (GENERIC EQUIVALENT) 1 MG capsule Take 3 capsules (3 mg) by mouth every morning AND 2 capsules (2 mg) every evening. 450 capsule 3    ursodiol (ACTIGALL) 250 MG tablet Take 1 tablet (250 mg) by mouth 2 times daily 180 tablet 3    valGANciclovir (VALCYTE) 450 MG tablet Take 1 tablet (450 mg) by mouth daily 180 tablet 0    insulin aspart (NOVOLOG PEN) 100 UNIT/ML pen Inject 1 Units Subcutaneous 3 times daily (with meals) 1 unit of insulin with 12 grams of carb with breakfast / lunch and dinner . (Patient not taking: Reported on 8/22/2023) 15 mL 0      No Known Allergies     Physical Examination Review of Systems   Vitals: /80 (BP Location: Right arm,  "Patient Position: Sitting, Cuff Size: Adult Large)   Pulse 100   Resp 16   Ht 1.702 m (5' 7\")   Wt 101.1 kg (222 lb 12.8 oz)   BMI 34.90 kg/m    BMI= Body mass index is 34.9 kg/m .  Wt Readings from Last 3 Encounters:   08/22/23 101.1 kg (222 lb 12.8 oz)   08/21/23 101.4 kg (223 lb 9.6 oz)   08/11/23 101.4 kg (223 lb 8 oz)       General: pleasant male. No acute distress.   HENT: external ears normal. Nares patent. Mucous membranes moist.  Eyes: perrla, extraocular muscles intact. No scleral icterus.   Neck: No JVD  Lungs: clear to auscultation  COR: mildly fast rate, irregularly irregular rhythm, No murmurs, rubs, or gallops  Abd: nondistended, BS present  Extrem: No edema        Please refer above for cardiac ROS details.       Past History   Past Medical History:   Past Medical History:   Diagnosis Date    Alcoholic cirrhosis of liver with ascites (H) 10/11/2019    ANCA-associated vasculitis (H) 2022    C. difficile colitis     Coronary artery disease     [facility] 4/2023 - complete occlusion of RCA    ESRD (end stage renal disease) on dialysis (H)     Gout     History of hemochromatosis 10/11/2019    Hypertension     IgA nephropathy     Obesity     PAF (paroxysmal atrial fibrillation) (H)     Pre-diabetes 2023    Psoriatic arthritis (H)     Psoriatic arthritis (H)     RA (rheumatoid arthritis) (H)     Status post kidney transplant 06/06/2023    Induction with thymoglobulin 4mg/kg, + DSA CW9    Status post liver transplantation (H) 06/06/2023        Past Surgical History:   Past Surgical History:   Procedure Laterality Date    APPENDECTOMY      Removed at 18 Years Old     BENCH KIDNEY  06/06/2023    Procedure: Bench kidney;  Surgeon: Chad Clifton MD;  Location:  OR    BENCH LIVER  06/06/2023    Procedure: Bench liver;  Surgeon: Chad Clifton MD;  Location:  OR    COLONOSCOPY      2014 at [facility]     CV CORONARY ANGIOGRAM N/A 04/27/2023    Procedure: Coronary Angiogram;  Surgeon: " Pablo Araujo MD;  [facility]    EYE SURGERY Bilateral     Cataract    H STATISTIC PICC LINE INSERTION >5YR, FAILED Left 2023    Unable to advance catheter over the axillary area    HERNIA REPAIR      History of bilateral inguinal hernia repair: 10/28/2014. Open hernia repair: 10/2017. Abdominal wound exploration and debridement 2017    INSERT SHUNT PORTAL TRANSJUGULAR INTRAHEPTIC  2022    IR CVC NON TUNNEL LINE REMOVAL  7/3/2023    IR CVC NON TUNNEL PLACEMENT > 5 YRS  2023    IR CVC TUNNEL PLACEMENT > 5 YRS OF AGE  2023    IR PICC PLACEMENT > 5 YRS OF AGE  2023    IR RENAL BIOPSY RIGHT  2023    RETURN LIVER TRANSPLANT N/A 2023    Procedure: Return liver transplant. Intra-operative ultrasound;  Surgeon: Chad Clifton MD;  Location: [facility] OR    TRANSPLANT KIDNEY RECIPIENT  DONOR N/A 2023    Procedure: Transplant kidney recipient  donor, ureteral stent placement;  Surgeon: Chad Clifton MD;  Location: [facility] OR    TRANSPLANT LIVER RECIPIENT  DONOR N/A 2023    Procedure: Transplant liver recipient  donor;  Surgeon: Chad Clifton MD;  Location:  OR        Family History:   Family History   Problem Relation Age of Onset    Lung Cancer Mother     Colon Cancer Father     Lung Cancer Brother     Heart Failure Brother     Kidney Disease Brother         Social History:   Social History     Socioeconomic History    Marital status:      Spouse name: Not on file    Number of children: Not on file    Years of education: Not on file    Highest education level: Not on file   Occupational History    Not on file   Tobacco Use    Smoking status: Never     Passive exposure: Never    Smokeless tobacco: Never   Vaping Use    Vaping Use: Never used   Substance and Sexual Activity    Alcohol use: Not Currently     Comment: Last ETOH use was 2021    Drug use: Not Currently     Sexual activity: Not on file   Other Topics Concern    Parent/sibling w/ CABG, MI or angioplasty before 65F 55M? Not Asked   Social History Narrative    Not on file     Social Determinants of Health     Financial Resource Strain: Not on file   Food Insecurity: Not on file   Transportation Needs: Not on file   Physical Activity: Not on file   Stress: Not on file   Social Connections: Not on file   Intimate Partner Violence: Not on file   Housing Stability: Not on file            Lab Results    Chemistry/lipid CBC Cardiac Enzymes/BNP/TSH/INR   Lab Results   Component Value Date    CHOL 285 (H) 11/22/2022    HDL 79 11/22/2022    TRIG 96 11/22/2022    BUN 15.3 08/21/2023     08/21/2023    CO2 24 08/21/2023    Lab Results   Component Value Date    WBC 2.6 (L) 08/21/2023    WBC 2.6 (L) 08/21/2023    HGB 8.2 (L) 08/21/2023    HCT 28.5 (L) 08/21/2023    MCV 93 08/21/2023     08/21/2023    Lab Results   Component Value Date    TSH 3.37 04/07/2023    INR 1.22 (H) 08/17/2023                Thank you for allowing me to participate in the care of your patient.      Sincerely,     Phillip Marrero MD     [facility]  cc:   Phillip Marrero MD  [address], [address]  [place] [address]

## 2023-08-22 NOTE — PROGRESS NOTES
ANTICOAGULATION  MANAGEMENT    Damion Quinones is being discharged from the [facility] ([facility]).    Reason for discharge: warfarin replaced by alternate therapy, Eliquis    Anticoagulation episode resolved, ACC referral closed, and Standing order discontinued    If patient needs warfarin management in the future, please send a new referral    Alejandra Naylor RN

## 2023-08-22 NOTE — PATIENT INSTRUCTIONS
It was a pleasure to meet with you today.      Below is a summary of your visit.   Stop warfarin and start taking eliquis 5 mg twice daily  Increase your metoprolol to 12.5 mg twice daily  Ask your transplant coordinator whether you need to be on aspirin. From my perspective, with the eliquis, you do not need aspirin.  Schedule a holter monitor to be worn in about 2 weeks to assess your heart rate control with atrial fibrillation.  Follow up with me in about 3 months    We will call you to inform you of your test or procedure results within 3 business days of the test being performed.  If you do not hear from our office with the test results within 1 week please do not hesitate to call asking for these results.     Please do not hesitate to call the [facility] with any questions or concerns at [phone].     Sincerely,

## 2023-08-22 NOTE — TELEPHONE ENCOUNTER
Casey saw cardiology today. Cardiologist increased his metoprolol dose and changed his anticoagulant from warfarin to eliquis. Cardiologist thinks it should be ok to stop aspirin but wanted the official ok from transplant team. Message sent to Dr. Clifton to double check.   Will update Apoorva regarding aspirin decision from [facility].    ADDENDUM:  Dr. Clifton ok with stopping aspirin while on eliquis. Weele message sent to let Casey and Apoorva know.

## 2023-08-23 ENCOUNTER — TELEPHONE (OUTPATIENT)
Dept: TRANSPLANT | Facility: CLINIC | Age: 68
End: 2023-08-23
Payer: COMMERCIAL

## 2023-08-23 NOTE — TELEPHONE ENCOUNTER
Call to Casey to check in.   Casey says he is feeling great. He is pushing himself to do more. He is pleased with his progress and says he feels better than he has in a couple years.   Discussed vacation with Casey and Apoorva again. Reiterated that our discussion last week was from a liver standpoint only, they both said they knew that already. Casey said that they had talked about it with Dr. Clifton at his last appt and he is fine with it. Casey and Apoorva know that things could change between now and October when they plan on going to [place] to see their kids and grandkids. They will discuss plans with Dr. Dutta on 8/29.

## 2023-08-24 ENCOUNTER — TELEPHONE (OUTPATIENT)
Dept: PHARMACY | Facility: CLINIC | Age: 68
End: 2023-08-24

## 2023-08-24 NOTE — TELEPHONE ENCOUNTER
Clinical Pharmacy Consult:                                                      Transplant Specific: 1 month post transplant pharmacy review  Date of Transplant: 06/06/2023  Type of Transplant: kidney and liver  First Transplant: yes  History of rejection: no    Immunosuppression Regimen   TAC 3 mg qAM & 2 mg qPM, Prednisone 10 mg daily, and Myforitic 720 mg qAM & 720 mg qPM  Patient specific goal: 8-10  Most recent level: 8.1 on 8/21/2023  Immunosuppressant Levels:  Therapeutic  Pt adherent to lab draws: yes  Scr:   Lab Results   Component Value Date    CR 1.08 07/26/2023    CR 1.11 08/27/2019     Side effects: none of late    Prophylactic Medications  Antibacterial:  Bactrim 400-800 daily  Scheduled Discontinue Date: Lifelong    Antifungal: Nystatin  Scheduled Discontinue Date: Therapy Complete    Antiviral: Max dose in Liver transplant is Valcyte 450mg once daily   Scheduled Discontinue Date: 3 months    Acid Reducer: Protonix (pantoprazole)  Scheduled Reviewed Date:  MD to evaluate    Thrombosis Prevention: Apixiban 5 mg twice daily  Scheduled Discontinue Date:  MD to evaluate    Blood Pressure Management  Frequency of home Blood Pressure checks:  Infrequent at home, they are getting frequent checks at office visits  Most recent home BP:  124/80  Patient Blood pressure goal: <140/90  Patient blood pressure at goal:  yes  Hospitalizations/ER visits since last assessment:  0    Med rec/DUR performed: yes  Med Rec Discrepancies: no    Spoke with Casey and wife Apoorva via phone today. Casey is doing well at home lately. Appears to be tolerating regimen well. Tacrolimus at goal. No recent side effects, especially since they have been able to hold insulin injections. No GI, tremors, paraesthesias. Confirmed  magnesium from immunosuppressants.     They are using the medication card and keeping it up today. Continue to use pill organizer, Apoorva arranges the pillbox for Casey. Apoorva would like for Casey to get more  independent on filling it in the future. Apoorva denies missed doses. Pharmacy refill records not extensive enough to calculate Percent Days Covered, but estimate close to 1.00 noting excellent adherence. No concerns in filling medications, occasionally use local [facility] though if needed.     Blood pressure at goal. Average blood sugars are 88 AM, 150 noon, and 90 evening. He has not needed insulin injections lately. No outside hospitalizations or urgent care visits reported. No questions for care team today.     Next follow-up 1 month with pharmacy.    Time Spent: 15 minutes    Asif Cui, PharmD, BCACP  [facility]  [address] [place] [address]  Specialty - [phone]  Transplant - [phone]  Mail - [phone]

## 2023-08-25 ENCOUNTER — NURSE TRIAGE (OUTPATIENT)
Dept: NURSING | Facility: CLINIC | Age: 68
End: 2023-08-25

## 2023-08-25 NOTE — TELEPHONE ENCOUNTER
"Triage Call:     Pt and patient's wife calling to report that patient has a 3x3 skin tear on his sHin from an exercise bike 1-2 days ago  The area was not cleaned off, but it was bandaged right away  Pt states that the area is \"very painful\" with trying to re-bandage the area, but is rating the pain a 3/10 when not touching it. Still bleeding; total raw wound. Where the skin is open it is r\"aw\"    On eliquis  Mepilex dressing is on the area  Tetanus shots are up to date  Weak immune system  Transplant patient     No signs of infection, but patient's wife wants to make sure she is doing the right treatment to the area.     Disposition: ED. Pt and caller were given care adivce. She states that patient has an appt at [facility] today and they might go there to have it looked at.       Reason for Disposition   Skin is split open or gaping (or length > 1/2 inch or 12 mm on the skin, 1/4 inch or 6 mm on the face)    Additional Information   Negative: Major bleeding (e.g., actively dripping or spurting) and can't be stopped   Negative: Amputation   Negative: Shock suspected (e.g., cold/pale/clammy skin, too weak to stand, low BP, rapid pulse)   Negative: Knife wound (or other possibly deep cut) and to chest, abdomen, back, neck, or head   Negative: Self-injury (e.g., cutting, self-harm) and suicidal or out-of-control   Negative: Sounds like a life-threatening emergency to the triager   Negative: Animal bite and broken skin   Negative: Human bite and broken skin   Negative: Puncture wound   Negative: Bleeding and won't stop after 10 minutes of direct pressure (using correct technique)   Negative: Deep cut and can see bone or tendons   Negative: Cut over knuckle (MCP joint)   Negative: Sensation of something in the wound (i.e., retained object in wound)   Negative: Dirt in the wound and not removed with 15 minutes of scrubbing   Negative: Wound causes numbness (i.e., loss of sensation)   Negative: Wound causes weakness (i.e., " decreased ability to move hand, finger, toe)   Negative: No prior tetanus shots (or is not fully vaccinated)   Negative: HIV positive or severe immunodeficiency (severely weak immune system) and DIRTY cut   Negative: Looks infected and large red area (> 2 inches or 5 cm) or streak   Negative: SEVERE pain and not improved 2 hours after pain medicine   Negative: Fever and spreading red area or streak   Negative: Suspicious history for the injury   Negative: Looks infected (spreading redness, pus) and no fever   Negative: Patient wants to be seen    Protocols used: Cuts and [id number]  Deana Vargas RN  [facility] Nurse Advisor 8:26 AM 8/25/2023

## 2023-08-29 ENCOUNTER — OFFICE VISIT (OUTPATIENT)
Dept: TRANSPLANT | Facility: CLINIC | Age: 68
End: 2023-08-29
Attending: INTERNAL MEDICINE
Payer: COMMERCIAL

## 2023-08-29 ENCOUNTER — LAB (OUTPATIENT)
Dept: LAB | Facility: CLINIC | Age: 68
End: 2023-08-29
Attending: INTERNAL MEDICINE
Payer: COMMERCIAL

## 2023-08-29 VITALS
SYSTOLIC BLOOD PRESSURE: 143 MMHG | DIASTOLIC BLOOD PRESSURE: 81 MMHG | WEIGHT: 222 LBS | OXYGEN SATURATION: 98 % | BODY MASS INDEX: 34.77 KG/M2 | HEART RATE: 100 BPM

## 2023-08-29 DIAGNOSIS — I15.1 HTN, KIDNEY TRANSPLANT RELATED: ICD-10-CM

## 2023-08-29 DIAGNOSIS — Z94.4 LIVER REPLACED BY TRANSPLANT (H): ICD-10-CM

## 2023-08-29 DIAGNOSIS — M1A.9XX1 TOPHACEOUS GOUT: ICD-10-CM

## 2023-08-29 DIAGNOSIS — Z94.0 HTN, KIDNEY TRANSPLANT RELATED: ICD-10-CM

## 2023-08-29 DIAGNOSIS — Z79.899 ENCOUNTER FOR LONG-TERM CURRENT USE OF MEDICATION: ICD-10-CM

## 2023-08-29 DIAGNOSIS — D84.9 IMMUNOSUPPRESSED STATUS (H): ICD-10-CM

## 2023-08-29 DIAGNOSIS — Z48.298 AFTERCARE FOLLOWING ORGAN TRANSPLANT: ICD-10-CM

## 2023-08-29 DIAGNOSIS — Z94.0 KIDNEY REPLACED BY TRANSPLANT: Primary | ICD-10-CM

## 2023-08-29 DIAGNOSIS — Z94.0 KIDNEY REPLACED BY TRANSPLANT: ICD-10-CM

## 2023-08-29 DIAGNOSIS — Z94.4 LIVER TRANSPLANT RECIPIENT (H): ICD-10-CM

## 2023-08-29 PROBLEM — D63.1 ANEMIA IN STAGE 2 CHRONIC KIDNEY DISEASE: Status: ACTIVE | Noted: 2023-08-02

## 2023-08-29 PROBLEM — N18.2 ANEMIA IN STAGE 2 CHRONIC KIDNEY DISEASE: Status: ACTIVE | Noted: 2023-08-02

## 2023-08-29 LAB
ALBUMIN MFR UR ELPH: 19.2 MG/DL
ALBUMIN SERPL BCG-MCNC: 3.8 G/DL (ref 3.5–5.2)
ALBUMIN UR-MCNC: NEGATIVE MG/DL
ALP SERPL-CCNC: 172 U/L (ref 40–129)
ALT SERPL W P-5'-P-CCNC: 51 U/L (ref 0–70)
ANION GAP SERPL CALCULATED.3IONS-SCNC: 14 MMOL/L (ref 7–15)
APPEARANCE UR: CLEAR
AST SERPL W P-5'-P-CCNC: 35 U/L (ref 0–45)
BILIRUB DIRECT SERPL-MCNC: 0.22 MG/DL (ref 0–0.3)
BILIRUB SERPL-MCNC: 0.5 MG/DL
BILIRUB UR QL STRIP: NEGATIVE
BUN SERPL-MCNC: 16.8 MG/DL (ref 8–23)
CALCIUM SERPL-MCNC: 9.5 MG/DL (ref 8.8–10.2)
CHLORIDE SERPL-SCNC: 102 MMOL/L (ref 98–107)
COLOR UR AUTO: YELLOW
CREAT SERPL-MCNC: 0.94 MG/DL (ref 0.67–1.17)
CREAT UR-MCNC: 101 MG/DL
DEPRECATED HCO3 PLAS-SCNC: 24 MMOL/L (ref 22–29)
ERYTHROCYTE [DISTWIDTH] IN BLOOD BY AUTOMATED COUNT: 17.6 % (ref 10–15)
GFR SERPL CREATININE-BSD FRML MDRD: 90 ML/MIN/1.73M2
GLUCOSE SERPL-MCNC: 118 MG/DL (ref 70–99)
GLUCOSE UR STRIP-MCNC: NEGATIVE MG/DL
HCT VFR BLD AUTO: 29.9 % (ref 40–53)
HGB BLD-MCNC: 8.6 G/DL (ref 13.3–17.7)
HGB UR QL STRIP: NEGATIVE
KETONES UR STRIP-MCNC: NEGATIVE MG/DL
LEUKOCYTE ESTERASE UR QL STRIP: NEGATIVE
MAGNESIUM SERPL-MCNC: 1.6 MG/DL (ref 1.7–2.3)
MCH RBC QN AUTO: 26.3 PG (ref 26.5–33)
MCHC RBC AUTO-ENTMCNC: 28.8 G/DL (ref 31.5–36.5)
MCV RBC AUTO: 91 FL (ref 78–100)
NITRATE UR QL: NEGATIVE
PH UR STRIP: 7 [PH] (ref 5–7)
PHOSPHATE SERPL-MCNC: 2.7 MG/DL (ref 2.5–4.5)
PLATELET # BLD AUTO: 288 10E3/UL (ref 150–450)
POTASSIUM SERPL-SCNC: 4.1 MMOL/L (ref 3.4–5.3)
PROT SERPL-MCNC: 6.2 G/DL (ref 6.4–8.3)
PROT/CREAT 24H UR: 0.19 MG/MG CR (ref 0–0.2)
RBC # BLD AUTO: 3.27 10E6/UL (ref 4.4–5.9)
SODIUM SERPL-SCNC: 140 MMOL/L (ref 136–145)
SP GR UR STRIP: 1.02 (ref 1–1.03)
TACROLIMUS BLD-MCNC: 6.2 UG/L (ref 5–15)
TME LAST DOSE: NORMAL H
TME LAST DOSE: NORMAL H
UROBILINOGEN UR STRIP-MCNC: NORMAL MG/DL
WBC # BLD AUTO: 4 10E3/UL (ref 4–11)

## 2023-08-29 PROCEDURE — 80180 DRUG SCRN QUAN MYCOPHENOLATE: CPT | Performed by: INTERNAL MEDICINE

## 2023-08-29 PROCEDURE — G0463 HOSPITAL OUTPT CLINIC VISIT: HCPCS | Performed by: INTERNAL MEDICINE

## 2023-08-29 PROCEDURE — 84100 ASSAY OF PHOSPHORUS: CPT | Performed by: PATHOLOGY

## 2023-08-29 PROCEDURE — 83735 ASSAY OF MAGNESIUM: CPT | Performed by: PATHOLOGY

## 2023-08-29 PROCEDURE — 87799 DETECT AGENT NOS DNA QUANT: CPT | Performed by: INTERNAL MEDICINE

## 2023-08-29 PROCEDURE — 84156 ASSAY OF PROTEIN URINE: CPT | Performed by: PATHOLOGY

## 2023-08-29 PROCEDURE — 80197 ASSAY OF TACROLIMUS: CPT | Performed by: TRANSPLANT SURGERY

## 2023-08-29 PROCEDURE — 85027 COMPLETE CBC AUTOMATED: CPT | Performed by: PATHOLOGY

## 2023-08-29 PROCEDURE — 99417 PROLNG OP E/M EACH 15 MIN: CPT | Performed by: INTERNAL MEDICINE

## 2023-08-29 PROCEDURE — 82248 BILIRUBIN DIRECT: CPT | Performed by: PATHOLOGY

## 2023-08-29 PROCEDURE — 36415 COLL VENOUS BLD VENIPUNCTURE: CPT | Performed by: PATHOLOGY

## 2023-08-29 PROCEDURE — 86833 HLA CLASS II HIGH DEFIN QUAL: CPT | Performed by: INTERNAL MEDICINE

## 2023-08-29 PROCEDURE — 84550 ASSAY OF BLOOD/URIC ACID: CPT | Performed by: PATHOLOGY

## 2023-08-29 PROCEDURE — 81003 URINALYSIS AUTO W/O SCOPE: CPT | Performed by: PATHOLOGY

## 2023-08-29 PROCEDURE — 99215 OFFICE O/P EST HI 40 MIN: CPT | Mod: 24 | Performed by: INTERNAL MEDICINE

## 2023-08-29 PROCEDURE — 80053 COMPREHEN METABOLIC PANEL: CPT | Performed by: PATHOLOGY

## 2023-08-29 PROCEDURE — 99000 SPECIMEN HANDLING OFFICE-LAB: CPT | Performed by: PATHOLOGY

## 2023-08-29 PROCEDURE — 86832 HLA CLASS I HIGH DEFIN QUAL: CPT | Performed by: INTERNAL MEDICINE

## 2023-08-29 RX ORDER — MYCOPHENOLIC ACID 180 MG/1
540 TABLET, DELAYED RELEASE ORAL 2 TIMES DAILY
Qty: 240 TABLET | Refills: 3 | COMMUNITY
Start: 2023-08-29 | End: 2023-09-13

## 2023-08-29 RX ORDER — PREDNISONE 5 MG/1
7.5 TABLET ORAL DAILY
Qty: 135 TABLET | Refills: 3 | Status: SHIPPED | OUTPATIENT
Start: 2023-08-29 | End: 2023-09-07

## 2023-08-29 ASSESSMENT — PAIN SCALES - GENERAL: PAINLEVEL: NO PAIN (0)

## 2023-08-29 NOTE — PROGRESS NOTES
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 PMHx also includes CAD, alcohol overuse, obesity, psoriatic arthritis, paroxysmal atrial fibrillation, history of DVT and HTN.    #. 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 HCC noted on explant: moderately differentiated, 2.8cm. RETREAT SCORE: 1 (2.8cm tumor, AFP < 20, no vascular invasion)  #. Iron deficiency anemia  #. Protein calorie malnutrition, mild     Patient is doing well post transplant with good liver and kidney graft function while on triple therapy- MMF, tacrolimus and prednisone. Ongoing mild alkaline phosphatase elevation. Patient with duct-to-duct anastomosis and no bile duct stent.     His energy level and strength are slowly improving; suspect he will have ongoing improvement in the setting of ongoing PT.    No s/sx of bleeding, but iron studies suggest iron deficiency. S/p aranesp, last 8/31/2023. Plan for IV iron infusions.    Given HCC noted on explant, plan for HCC surveillance q 6 months with CT chest, abdomen and pelvis + AFP. RETREAT SCORE: 1    Plan  -- IV iron infusions, scheduled   -- Tacrolimus immediate release (goal 8-10), Mycophenolic acid 540mg BID and prednisone 7.5 mg daily; monitoring of immunosuppression for efficacy and toxicity per protocol   * Agree with nephrology: decrease prednisone from 7.5mg to 5mg daily as able   -- Continue statin given CAD; off aspirin given on apixiban per cardiology  -- HCC surveillance given HCC on explant: CT chest + CT abdomen/pelvis and AFP every 6 months (due 12/2023)   -- Continue ursodiol 250mg BID; stop at 6 months  -- Prophylaxis   * PJP:  Sulfa/TMP (Bactrim)   * CMV: Valganciclovir (Valcyte), 450mg daily x3m (stop 9/6/23)  -- Continue complete alcohol abstinence  -- Patient to follow up with Dr. Clifton      Health Maintenance:  -- CRC Surveillance: Repeat due in 2023 given 5 polyps removed in 2020; path: tubular adenomas; ordered   -- Skin Checks: due 6/2024  -- Lipids: due 6/2023    RTC: 3 months    Lolis Bermudez MD (Lizzie)   of Medicine  Advanced & Transplant Hepatology  [facility]    I spent 45 minutes on this encounter performing the following: reviewing the patient's medical record (clinic visits, hospital records, lab results, imaging and procedural documentation), history taking, physical exam and documentation on the date of the encounter. I also spent part of the time in coordination of care and counseling.     HPI:  DBD LT (and kidney) 6/6/2023 for ETOH + hemochromatosis (homozygous H63D) cirrhosis  - operation: Caval replacement. Portal vein end-to-end anastomosis. Hepatic artery: there were 2 hepatic arteries the right and left; the right is connected to the splenic artery stump. Bile duct end-to-end anastomosis  - explant: 2.8cm HCC (moderately differentiated with no vascular or extra-hepatic extension) in the setting of MASLD cirrhosis  - post-op course   * RTOR on 6/6/203 with concern for low flow in the renal graft - ex-lap, washout, closure with healthy appearing graft with intact arterial and venous flow.   - immunosuppression: Tacrolimus + MMF + prednisone    He presents with his wife Apoorva.    He has been at home for a month and reports improvement in his energy level and strength.  He reports that he still has lethargy at times and needs to sit down more often.  He has been trying to work out on his exercise bike every couple of days for about 3 miles at a time which takes him about 15 minutes.  He did get an injury on the bike last week and has not been on the bike since.  He  notices that he is worn out more quickly than he was prior to getting sick with liver disease. He engages in PT once per week    He reports some shortness of breath with exertion but not at rest. Denies fevers, chills or cough. Denies chest pain with exertion.     He has mild stomach upset/cramping when he wakes up in the morning but this resolves with bowel movements. 1-2 BM per day without blood. Mild nausea, intermittent, no clear trigger. Currently on magnesium 800mg BID.    Currently on prednisone 7.5mg per day; plan to see rheumatology tomorrow.     No epistaxis, gum bleeding, hematuria, melena or hematochezia. Remains on apixiban.     Planning to go to [employer] this weekend to speak at a conference. Planning to visit grandchildren in [place] in the fall.     8/1/2023: S/P Flexible cystoscopy and ureteral stent removal     Medical hx Surgical hx   Past Medical History:   Diagnosis Date    Alcoholic cirrhosis of liver with ascites (H) 10/11/2019    ANCA-associated vasculitis (H) 2022    C. difficile colitis     Coronary artery disease     [facility] 4/2023 - complete occlusion of RCA    ESRD (end stage renal disease) on dialysis (H)     Gout     History of hemochromatosis 10/11/2019    Hypertension     IgA nephropathy     Obesity     PAF (paroxysmal atrial fibrillation) (H)     Pre-diabetes 2023    Psoriatic arthritis (H)     RA (rheumatoid arthritis) (H)     Status post kidney transplant 06/06/2023    Induction with thymoglobulin 4mg/kg, + DSA CW9    Status post liver transplantation (H) 06/06/2023      Past Surgical History:   Procedure Laterality Date    APPENDECTOMY      Removed at 18 Years Old     BENCH KIDNEY  06/06/2023    Procedure: Bench kidney;  Surgeon: Chad Clifton MD;  Location:  OR    BENCH LIVER  06/06/2023    Procedure: Bench liver;  Surgeon: Chad Clifton MD;  Location:  OR    COLONOSCOPY      2014 at [facility]     CV CORONARY ANGIOGRAM N/A 04/27/2023    Procedure:  Coronary Angiogram;  Surgeon: Pablo Araujo MD;  Location:  HEART CARDIAC CATH LAB    EYE SURGERY Bilateral     Cataract    H STATISTIC PICC LINE INSERTION >5YR, FAILED Left 2023    Unable to advance catheter over the axillary area    HERNIA REPAIR      History of bilateral inguinal hernia repair: 10/28/2014. Open hernia repair: 10/2017. Abdominal wound exploration and debridement 2017    INSERT SHUNT PORTAL TRANSJUGULAR INTRAHEPTIC  2022    IR CVC NON TUNNEL LINE REMOVAL  7/3/2023    IR CVC NON TUNNEL PLACEMENT > 5 YRS  2023    IR CVC TUNNEL PLACEMENT > 5 YRS OF AGE  2023    IR PICC PLACEMENT > 5 YRS OF AGE  2023    IR RENAL BIOPSY RIGHT  2023    RETURN LIVER TRANSPLANT N/A 2023    Procedure: Return liver transplant. Intra-operative ultrasound;  Surgeon: Chad Clifton MD;  Location: [facility] OR    TRANSPLANT KIDNEY RECIPIENT  DONOR N/A 2023    Procedure: Transplant kidney recipient  donor, ureteral stent placement;  Surgeon: Chad Clifton MD;  Location: [facility] OR    TRANSPLANT LIVER RECIPIENT  DONOR N/A 2023    Procedure: Transplant liver recipient  donor;  Surgeon: Chad Clifton MD;  Location: [facility] OR   + knee replacements x 2       Medications  Current Outpatient Medications   Medication Sig Dispense Refill    Alcohol Swabs PADS Use to swab the area of the injection or quyen as directed Per insurance coverage 100 each 0    allopurinol (ZYLOPRIM) 100 MG tablet Take 300 mg by mouth daily 180 tablet 3    apixaban ANTICOAGULANT (ELIQUIS ANTICOAGULANT) 5 MG tablet Take 1 tablet (5 mg) by mouth 2 times daily 180 tablet 3    atorvastatin (LIPITOR) 20 MG tablet Take 1 tablet (20 mg) by mouth every evening 30 tablet 0    blood glucose (NO BRAND SPECIFIED) lancets standard To use to test glucose level in the blood Use to test blood sugar  4  times daily as directed. To accompany glucose monitor brands per  insurance coverage. 100 each 0    blood glucose (NO BRAND SPECIFIED) test strip Use to test blood sugar 3 times daily or as directed. 30 strip 3    blood glucose (NO BRAND SPECIFIED) test strip To use to test glucose level in the blood Use to test blood sugar  4 times daily as directed. To accompany glucose monitor brands per insurance coverage. 4 strip 1    blood glucose monitoring (NO BRAND SPECIFIED) meter device kit Use as directed Per insurance coverage 1 kit 0    magnesium oxide (MAG-OX) 400 MG tablet Take 2 tablets (800 mg) by mouth 2 times daily 60 tablet 0    metoprolol tartrate (LOPRESSOR) 25 MG tablet Take 0.5 tablets (12.5 mg) by mouth 2 times daily 180 tablet 3    multivitamin w/minerals (THERA-VIT-M) tablet Take 1 tablet by mouth daily 30 tablet 0    MYFORTIC (BRAND) 180 MG EC tablet Take 3 tablets (540 mg) by mouth 2 times daily 240 tablet 3    pantoprazole (PROTONIX) 40 MG EC tablet Take 1 tablet (40 mg) by mouth every morning (before breakfast) 30 tablet 0    predniSONE (DELTASONE) 5 MG tablet Take 1.5 tablets (7.5 mg) by mouth daily for 90 days 135 tablet 3    Sharps Container MISC Use as directed to dispose of needles, lancets and other sharps 1 each 0    sulfamethoxazole-trimethoprim (BACTRIM) 400-80 MG tablet Take 1 tablet by mouth daily 90 tablet 3    tacrolimus (GENERIC EQUIVALENT) 1 MG capsule Take 3 capsules (3 mg) by mouth 2 times daily 540 capsule 3    ursodiol (ACTIGALL) 250 MG tablet Take 1 tablet (250 mg) by mouth 2 times daily 180 tablet 3    valGANciclovir (VALCYTE) 450 MG tablet Take 1 tablet (450 mg) by mouth daily 180 tablet 0       Allergies  No Known Allergies    Family hx Social hx   Family History   Problem Relation Age of Onset    Lung Cancer Mother     Colon Cancer Father     Lung Cancer Brother     Heart Failure Brother     Kidney Disease Brother    - Brother: lung cancer in 50s  - Brother: heart disease (unclear what), renal disease (unclear what)  - Brother: colon issues  (unclear what)   - Alcohol: None since prior to transplant   - Tobacco: Denies  - Drugs: Denies  -  (Apoorva), has two adult children   - Retired, previously in IT. Hasn't worked sine 2019.  - Stays active.  He has been using up recumbent bike every other day and then walking on days where he does not bike.     Review of systems  A 10-point review of systems was negative.    Examination  /89 (BP Location: Right arm, Patient Position: Sitting, Cuff Size: Adult Regular)   Pulse 109   Wt 99.8 kg (220 lb)   SpO2 99%   BMI 34.46 kg/m    Body mass index is 34.46 kg/m .    Gen-NAD  Eye- No conjunctival icterus  ENT- MMM, normal oropharynx  CVS- RRR, no murmurs  RS- CTA bilaterally  Abd- Distended. Non-tender throughout. Umbilical hernia - reducible.   Extr- 2+ radial pulses bilaterally, trace lower extremity edema bilaterally  MS- hands without clubbing  Neuro- A+Ox3  Skin- no rash. No jaundice. Duptyrens contractures bilaterally.  Ecchymoses on his arms bilaterally. Gout in right hand.   Psych- normal mood    Laboratory  BMP  Recent Labs   Lab Test 09/05/23  0702 08/29/23  1014 08/21/23  0915 08/17/23  0720    140 140 139   POTASSIUM 3.7 4.1 4.1 4.1   CHLORIDE 101 102 104 104   NAZARIO 9.2 9.5 9.3 9.1   CO2 25 24 24 22   BUN 19.6 16.8 15.3 14.9   CR 1.12 0.94 1.00 1.01   * 118* 104* 98     CBC  Recent Labs   Lab Test 09/05/23  0702 08/29/23  1014 08/21/23  0915 08/17/23  0720   WBC 4.7 4.0 2.6*  2.6* 2.4*  2.3*   RBC 3.13* 3.27* 3.05* 2.79*   HGB 8.0* 8.6* 8.2* 7.5*   HCT 27.7* 29.9* 28.5* 26.3*   MCV 89 91 93 94   MCH 25.6* 26.3* 26.9 26.9   MCHC 28.9* 28.8* 28.8* 28.5*   RDW 17.4* 17.6* 17.6* 17.5*    288 262 293     Liver Enzymes   Recent Labs   Lab Test 09/05/23  0702   PROTTOTAL 6.0*   ALBUMIN 3.8   BILITOTAL 0.5   ALKPHOS 169*   AST 29   ALT 42      INR   INR   Date Value Ref Range Status   08/17/2023 1.22 (H) 0.85 - 1.15 Final         Iron sat 41%; ferritin 896  MIGUEL A positive  Hep A  IgG negative  Hep B s Ag negative  Hep B s Ab < 2.0  Hep B c Ab negative  Hep C ab negative    Hemochromatosis: negative C282Y, homozygous H63D, negative S65C    Kidney Biopsy 6/2023  Focal acute tubular necrosis  Mild peritubular capillaritis, associated with no other significant signs of active antibody-mediated rejection (g0 ptc1 v0 c4d0)   No significant signs of acute cell-mediated rejection (i0 t0 v0)   Chronic changes of the parenchyma, including:   - no global glomerulosclerosis   - focal tubular atrophy and interstitial fibrosis (<5 of the cortex)   - mild arterial and arteriolar sclerosis       (ct0 ci0 cv1 ah0 mm0 cg0)    Radiology  Abdominal US 6/2023  1.  The Doppler waveforms now appear more normal. Arterial waveforms are no longer poststenotic.  2.  Limited evaluation of the perihepatic fluid collections suggests no interval change.    Endoscopy  EGD 9/2022: Small EV, IGV1 oozing, portal HTN gastropathy, IGV2, no gastric ulcers, normal duodenum    Colonoscopy 7/31/2020:  - Four 2 to 3 mm polyps in the descending colon, in  the transverse colon and in the ascending colon, removed with a jumbo cold forceps. Resected and retrieved.  - One 8 mm polyp in the descending colon, removed with a hot snare. Resected and retrieved. Clip (MR conditional) was placed.   Pathology: Tubular adenomas

## 2023-08-29 NOTE — LETTER
8/29/2023         RE: Damion Quinones   [address] [facility] [address]        Dear Colleague,    Thank you for referring your patient, Damion Quinones, to the [facility]. Please see a copy of my visit note below.    TRANSPLANT NEPHROLOGY EARLY POST TRANSPLANT VISIT    Assessment & Plan   # DDKT (SLK): Stable   - Baseline Creatinine: ~ 1-1.2   - Proteinuria: Normal (<0.2 grams)   - Date DSA Last Checked: Aug/2023      Latest DSA: No but had DSA to Cq9 w/  at txp   - BK Viremia: No   - Kidney Tx Biopsy: Jun 20, 2023; Result: negative for rejection. Focal ATN   - Transplant Ureteral Stent: Removed      # Liver Tx (SLK): Stable liver graft function. Followed by transplant surgery.     # Immunosuppression: Tacrolimus immediate release (goal 8-10), Mycophenolic acid (dose 720 mg every 12 hours) and Prednisone (dose 10 mg daily)   - Induction with Recent Transplant:   rATG total 2mg/kg, stopped 2/2 sepsis   - Continue with intensive monitoring of immunosuppression for efficacy and toxicity.   - Changes: Yes - decrease prednisone to 7.5mg daily for 3-4 weeks. If no gout flares decrease to 5mg daily indefinitely due to amount of time he was on prednisone pre txp . Decrease mycophenolate to 540mg twice daily and recheck MPA level     # Infection Prophylaxis:   - PJP: Sulfa/TMP (Bactrim)  - CMV: Valganciclovir (Valcyte), 450mg daily x3m (stop 9/6/23)    # Hypertension: Controlled;  Goal BP: < 130/80   - Volume status: Euvolemic  EDW: 215lbs   - Changes: Not at this time. Continue metoprolol 12.5mg bid.    # Steroid induced hyperglycemia: Controlled (HbA1c <7%) Last HbA1c: 5.1%   - Management as per primary care.      # Anemia in Chronic Renal Disease: Hgb: Trend up      FEDERICO: Yes, next dose aranesp on 8/31    - Iron studies: Low iron saturation, needs IV iron    # Mineral Bone Disorder:   - Secondary renal hyperparathyroidism; PTH level: Normal (15-65 pg/ml)        On treatment: None  -  Vitamin D; level: Normal        On supplement: No  - Calcium; level: Normal        On supplement: No  - Phosphorus; level: Normal        On supplement: No    # Electrolytes:   - Potassium; level: Normal        On supplement: No  - Magnesium; level: Stable low        On supplement: Yes, mag ox 800mg bid  - Bicarbonate; level: Normal        On supplement: No    # Paroxysmal A-Fib:    -Stable on metoprolol and anticoagulated on apixaban    # E. Faecium Bacteremia: Diagnosed on 6/9 BlCx. Treated with IV vancomycin      # ANCA Vasculitis: S/p Rituximab x2              - U/A negative on 8/4. Repeat U/A monthly      # Gout: On prednisone 10 mg PO daily PTA. Currently on prednisone 10 but not currently with gout symptoms  -Uric acid 8              -Continue allopurinol 200mg daily.    -Decrease prednisone to 7.5mg daily for 3-4 weeks and if still asymptomatic decrease to 5mg daily indefinitely               -Follows with rheumatology at [facility], Dr. Rucker.     # Skin tear on shin:   -Occurred 8/23. Improving. 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  Tx: DDKT (K) and Liver Tx ([facility])  Transplant: 6/6/2023 (Liver), 6/6/2023 (Kidney)  Donor Type: Donation after Brain Death Donor Class: Standard Criteria Donor  Crossmatch at time of Tx: negative  DSA at time of Tx: Yes, to Cw9 w/   Significant changes in immunosuppression: None  CMV IgG Ab High Risk Discordance (D+/R-): No  EBV IgG Ab High Risk Discordance (D+/R-): No  Significant transplant-related complications: DGF    Transplant Office Phone Number: [phone]    Assessment and plan was discussed with the patient and he voiced his understanding and agreement.    Return visit: Return in about 2 months (around 11/6/2023) for reschedule 10/31/23 appointment as AKT with any transplant nephrologist.    Subhash  MD Tra    I spent 69 minutes on the date of the encounter doing chart review, review of outside records, review of test results, interpretation of tests, patient visit, documentation and discussion with family    Chief Complaint   Mr. Quinones is a 67 year old here for kidney transplant, immunosuppression management and hospital follow up after kidney transplant.     History of Present Illness   The patient overall feels well. He denies any recent hospitalizations. He denies nausea, vomiting, diarrhea but does have loose stools, fever, chills, shortness of breath, chest pain, LE edema, unintentional weight loss, nights sweats, dysuria, hematuria.     He is not currently having a gout flare. He still has some tophi on his hands but no pain. Hasn't used anakinra.     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)     HTN, kidney transplant related     Hereditary hemochromatosis (H)     Other hyperlipidemia     Tophaceous gout     Deep vein thrombosis (DVT) of non-extremity vein, unspecified chronicity     CKD (chronic kidney disease), stage II     Chronic atrial fibrillation (H)     Liver replaced by transplant (H)     Immunosuppressed status (H)     Kidney replaced by transplant     CAD (coronary artery disease)     Steroid-induced hyperglycemia     Muscular deconditioning     Aftercare following organ transplant     Anemia in other chronic diseases classified elsewhere       Allergies   No Known Allergies    Medications   Current Outpatient Medications   Medication Sig     MYFORTIC (BRAND) 180 MG EC tablet Take 3 tablets (540 mg) by mouth 2 times daily     predniSONE (DELTASONE) 5 MG tablet Take 1.5 tablets (7.5 mg) by mouth daily for 90 days     Alcohol Swabs PADS Use to swab the area of the injection or quyen  as directed Per insurance coverage     allopurinol (ZYLOPRIM) 100 MG tablet Take 2 tablets (200 mg) by mouth daily     apixaban ANTICOAGULANT (ELIQUIS ANTICOAGULANT) 5 MG tablet Take 1 tablet (5 mg) by mouth 2 times daily     atorvastatin (LIPITOR) 20 MG tablet Take 1 tablet (20 mg) by mouth every evening     blood glucose (NO BRAND SPECIFIED) lancets standard To use to test glucose level in the blood Use to test blood sugar  4  times daily as directed. To accompany glucose monitor brands per insurance coverage.     blood glucose (NO BRAND SPECIFIED) test strip Use to test blood sugar 3 times daily or as directed.     blood glucose (NO BRAND SPECIFIED) test strip To use to test glucose level in the blood Use to test blood sugar  4 times daily as directed. To accompany glucose monitor brands per insurance coverage.     blood glucose monitoring (NO BRAND SPECIFIED) meter device kit Use as directed Per insurance coverage     magnesium oxide (MAG-OX) 400 MG tablet Take 2 tablets (800 mg) by mouth 2 times daily     metoprolol tartrate (LOPRESSOR) 25 MG tablet Take 0.5 tablets (12.5 mg) by mouth 2 times daily     multivitamin w/minerals (THERA-VIT-M) tablet Take 1 tablet by mouth daily     pantoprazole (PROTONIX) 40 MG EC tablet Take 1 tablet (40 mg) by mouth every morning (before breakfast)     Sharps Container MISC Use as directed to dispose of needles, lancets and other sharps     sulfamethoxazole-trimethoprim (BACTRIM) 400-80 MG tablet Take 1 tablet by mouth daily     tacrolimus (GENERIC EQUIVALENT) 1 MG capsule Take 3 capsules (3 mg) by mouth every morning AND 2 capsules (2 mg) every evening.     ursodiol (ACTIGALL) 250 MG tablet Take 1 tablet (250 mg) by mouth 2 times daily     valGANciclovir (VALCYTE) 450 MG tablet Take 1 tablet (450 mg) by mouth daily     No current facility-administered medications for this visit.     Medications Discontinued During This Encounter   Medication Reason     predniSONE (DELTASONE) 10  MG tablet      hydrocortisone (CORTAID) 1 % external cream      insulin aspart (NOVOLOG PEN) 100 UNIT/ML pen      insulin pen needle (32G X 4 MM) 32G X 4 MM miscellaneous      MYFORTIC (BRAND) 180 MG EC tablet Reorder (No AVS)     ondansetron (ZOFRAN) 4 MG tablet      simethicone (MYLICON) 125 MG chewable tablet        Physical Exam   Vital Signs: BP (!) 143/81 (BP Location: Left arm, Patient Position: Sitting, Cuff Size: Adult Large)   Pulse 100   Wt 100.7 kg (222 lb)   SpO2 98%   BMI 34.77 kg/m      GENERAL APPEARANCE: alert and no distress  HENT: mouth without ulcers or lesions  LYMPHATICS: no cervical or supraclavicular nodes  RESP: lungs clear to auscultation - no rales, rhonchi or wheezes  CV: regular rhythm, normal rate, no rub, no murmur  EDEMA: no LE edema bilaterally  ABDOMEN: soft, nondistended, nontender, bowel sounds normal  MS: extremities normal - no gross deformities noted, no evidence of inflammation in joints, no muscle tenderness  SKIN: no rash  NEURO: normal strength and tone, sensory exam grossly normal, mentation intact and speech normal  PSYCH: mentation appears normal and affect normal/bright  TX KIDNEY: normal  DIALYSIS ACCESS: none    Data         Latest Ref Rng & Units 8/21/2023     9:15 AM 8/17/2023     7:20 AM 8/14/2023     7:24 AM   Renal   Sodium 136 - 145 mmol/L 140  139  141    K 3.4 - 5.3 mmol/L 4.1  4.1  4.1    Cl 98 - 107 mmol/L 104  104  105    Cl (external) 98 - 107 mmol/L 104  104  105    CO2 22 - 29 mmol/L 24  22  21    Urea Nitrogen 8.0 - 23.0 mg/dL 15.3  14.9  18.1    Creatinine 0.67 - 1.17 mg/dL 1.00  1.01  0.94    Glucose 70 - 99 mg/dL 104  98  106    Calcium 8.8 - 10.2 mg/dL 9.3  9.1  9.3    Magnesium 1.7 - 2.3 mg/dL 1.5  1.6  1.6          Latest Ref Rng & Units 8/21/2023     9:15 AM 8/17/2023     7:20 AM 8/14/2023     7:24 AM   Bone Health   Phosphorus 2.5 - 4.5 mg/dL 2.7  3.1  2.8          Latest Ref Rng & Units 8/21/2023     9:15 AM 8/17/2023     7:20 AM 8/14/2023      7:24 AM   Heme   WBC 4.0 - 11.0 10e3/uL  4.0 - 11.0 10e3/uL 2.6     2.6  2.3     2.4  3.0    Hgb 13.3 - 17.7 g/dL 8.2  7.5  7.8    Plt 150 - 450 10e3/uL 262  293  324    ABSOLUTE NEUTROPHIL 1.6 - 8.3 10e3/uL 1.6  1.7     ABSOLUTE LYMPHOCYTES 0.8 - 5.3 10e3/uL 0.3  0.3     ABSOLUTE MONOCYTES 0.0 - 1.3 10e3/uL 0.3  0.0     ABSOLUTE EOSINOPHILS 0.0 - 0.7 10e3/uL 0.1  0.2           Latest Ref Rng & Units 8/21/2023     9:15 AM 8/17/2023     7:20 AM 8/14/2023     7:24 AM   Liver   AP 40 - 129 U/L 177  188  190    TBili <=1.2 mg/dL 0.4  0.3  0.4    Bilirubin Direct 0.00 - 0.30 mg/dL <0.20  <0.20  <0.20    ALT 0 - 70 U/L 27  38  34    AST 0 - 45 U/L 18  32  32    Tot Protein 6.4 - 8.3 g/dL 5.9  5.8  6.1    Albumin 3.5 - 5.2 g/dL 3.6  3.4  3.3          Latest Ref Rng & Units 6/7/2023     3:30 AM 6/6/2023     9:08 PM 6/5/2023     4:05 PM   Pancreas   A1C <5.7 %  5.1     Amylase 28 - 100 U/L 90   153    Lipase (Roche) 13 - 60 U/L 59            Latest Ref Rng & Units 8/7/2023     7:26 AM 8/1/2023     9:24 AM 11/22/2022     8:48 AM   Iron studies   Iron 61 - 157 ug/dL 28  23  68    Iron Sat Index 15 - 46 % 11  10  31    Ferritin 31 - 409 ng/mL 440  506  429          Latest Ref Rng & Units 6/5/2023     4:05 PM 11/22/2022     8:48 AM   [facility] Txp Virology   EBV CAPSID ANTIBODY IGG No detectable antibody. Positive  Positive         Recent Labs   Lab Test 08/14/23  0724 08/17/23  0720 08/21/23  0915   DOSTAC 8/13/2023 8/16/2023 8/20/2023   TACROL 7.8 7.5 8.1     Recent Labs   Lab Test 08/07/23  0726 08/14/23  0724 08/21/23  0915   DOSMPA 8/6/2023   8:00 PM 8/13/2023   8:00 PM 8/20/2023   9:00 PM   MPACID 1.32 1.51 3.04   MPAG 91.7 79.2 80.4       Again, thank you for allowing me to participate in the care of your patient.        Sincerely,        Subhash Dutta MD

## 2023-08-29 NOTE — PROGRESS NOTES
TRANSPLANT NEPHROLOGY EARLY POST TRANSPLANT VISIT    Assessment & Plan   # DDKT (K): Stable   - Baseline Creatinine: ~ 1-1.2   - Proteinuria: Normal (<0.2 grams)   - Date DSA Last Checked: Aug/2023      Latest DSA: No but had DSA to Cq9 w/  at txp   - BK Viremia: No   - Kidney Tx Biopsy: Jun 20, 2023; Result: negative for rejection. Focal ATN   - Transplant Ureteral Stent: Removed      # Liver Tx (K): Stable liver graft function. Followed by transplant surgery.     # Immunosuppression: Tacrolimus immediate release (goal 8-10), Mycophenolic acid (dose 720 mg every 12 hours) and Prednisone (dose 10 mg daily)   - Induction with Recent Transplant:   rATG total 2mg/kg, stopped 2/2 sepsis   - Continue with intensive monitoring of immunosuppression for efficacy and toxicity.   - Changes: Yes - decrease prednisone to 7.5mg daily for 3-4 weeks. If no gout flares decrease to 5mg daily indefinitely due to amount of time he was on prednisone pre txp . Decrease mycophenolate to 540mg twice daily and recheck MPA level     # Infection Prophylaxis:   - PJP: Sulfa/TMP (Bactrim)  - CMV: Valganciclovir (Valcyte), 450mg daily x3m (stop 9/6/23)    # Hypertension: Controlled;  Goal BP: < 130/80   - Volume status: Euvolemic  EDW: 215lbs   - Changes: Not at this time. Continue metoprolol 12.5mg bid.    # Steroid induced hyperglycemia: Controlled (HbA1c <7%) Last HbA1c: 5.1%   - Management as per primary care.      # Anemia in Chronic Renal Disease: Hgb: Trend up      FEDERICO: Yes, next dose aranesp on 8/31    - Iron studies: Low iron saturation, needs IV iron    # Mineral Bone Disorder:   - Secondary renal hyperparathyroidism; PTH level: Normal (15-65 pg/ml)        On treatment: None  - Vitamin D; level: Normal        On supplement: No  - Calcium; level: Normal        On supplement: No  - Phosphorus; level: Normal        On supplement: No    # Electrolytes:   - Potassium; level: Normal        On supplement: No  - Magnesium; level:  Stable low        On supplement: Yes, mag ox 800mg bid  - Bicarbonate; level: Normal        On supplement: No    # Paroxysmal A-Fib:    -Stable on metoprolol and anticoagulated on apixaban    # E. Faecium Bacteremia: Diagnosed on 6/9 BlCx. Treated with IV vancomycin      # ANCA Vasculitis: S/p Rituximab x2              - U/A negative on 8/4. Repeat U/A monthly      # Gout: On prednisone 10 mg PO daily PTA. Currently on prednisone 10 but not currently with gout symptoms  -Uric acid 8              -Continue allopurinol 200mg daily.    -Decrease prednisone to 7.5mg daily for 3-4 weeks and if still asymptomatic decrease to 5mg daily indefinitely               -Follows with rheumatology at [facility], Dr. Rucker.     # Skin tear on shin:   -Occurred 8/23. Improving. 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    Return visit: Return in about 2 months (around 11/6/2023) for reschedule 10/31/23 appointment as AKT with any transplant nephrologist.    Subhash Dutta MD    I spent 69 minutes on the date of the encounter doing chart review, review of outside records, review of test results, interpretation of tests, patient visit, documentation and discussion with family    Chief Complaint   Mr. Quinones is a 67  year old here for kidney transplant, immunosuppression management and hospital follow up after kidney transplant.     History of Present Illness   The patient overall feels well. He denies any recent hospitalizations. He denies nausea, vomiting, diarrhea but does have loose stools, fever, chills, shortness of breath, chest pain, LE edema, unintentional weight loss, nights sweats, dysuria, hematuria.     He is not currently having a gout flare. He still has some tophi on his hands but no pain. Hasn't used anakinra.     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)     HTN, kidney transplant related     Hereditary hemochromatosis (H)     Other hyperlipidemia     Tophaceous gout     Deep vein thrombosis (DVT) of non-extremity vein, unspecified chronicity     CKD (chronic kidney disease), stage II     Chronic atrial fibrillation (H)     Liver replaced by transplant (H)     Immunosuppressed status (H)     Kidney replaced by transplant     CAD (coronary artery disease)     Steroid-induced hyperglycemia     Muscular deconditioning     Aftercare following organ transplant     Anemia in other chronic diseases classified elsewhere       Allergies   No Known Allergies    Medications   Current Outpatient Medications   Medication Sig     MYFORTIC (BRAND) 180 MG EC tablet Take 3 tablets (540 mg) by mouth 2 times daily     predniSONE (DELTASONE) 5 MG tablet Take 1.5 tablets (7.5 mg) by mouth daily for 90 days     Alcohol Swabs PADS Use to swab the area of the injection or quyen as directed Per insurance coverage     allopurinol (ZYLOPRIM) 100 MG tablet Take 2 tablets (200 mg) by mouth daily     apixaban ANTICOAGULANT (ELIQUIS ANTICOAGULANT) 5 MG tablet Take 1 tablet (5 mg) by mouth 2 times daily     atorvastatin (LIPITOR) 20  MG tablet Take 1 tablet (20 mg) by mouth every evening     blood glucose (NO BRAND SPECIFIED) lancets standard To use to test glucose level in the blood Use to test blood sugar  4  times daily as directed. To accompany glucose monitor brands per insurance coverage.     blood glucose (NO BRAND SPECIFIED) test strip Use to test blood sugar 3 times daily or as directed.     blood glucose (NO BRAND SPECIFIED) test strip To use to test glucose level in the blood Use to test blood sugar  4 times daily as directed. To accompany glucose monitor brands per insurance coverage.     blood glucose monitoring (NO BRAND SPECIFIED) meter device kit Use as directed Per insurance coverage     magnesium oxide (MAG-OX) 400 MG tablet Take 2 tablets (800 mg) by mouth 2 times daily     metoprolol tartrate (LOPRESSOR) 25 MG tablet Take 0.5 tablets (12.5 mg) by mouth 2 times daily     multivitamin w/minerals (THERA-VIT-M) tablet Take 1 tablet by mouth daily     pantoprazole (PROTONIX) 40 MG EC tablet Take 1 tablet (40 mg) by mouth every morning (before breakfast)     Sharps Container MISC Use as directed to dispose of needles, lancets and other sharps     sulfamethoxazole-trimethoprim (BACTRIM) 400-80 MG tablet Take 1 tablet by mouth daily     tacrolimus (GENERIC EQUIVALENT) 1 MG capsule Take 3 capsules (3 mg) by mouth every morning AND 2 capsules (2 mg) every evening.     ursodiol (ACTIGALL) 250 MG tablet Take 1 tablet (250 mg) by mouth 2 times daily     valGANciclovir (VALCYTE) 450 MG tablet Take 1 tablet (450 mg) by mouth daily     No current facility-administered medications for this visit.     Medications Discontinued During This Encounter   Medication Reason     predniSONE (DELTASONE) 10 MG tablet      hydrocortisone (CORTAID) 1 % external cream      insulin aspart (NOVOLOG PEN) 100 UNIT/ML pen      insulin pen needle (32G X 4 MM) 32G X 4 MM miscellaneous      MYFORTIC (BRAND) 180 MG EC tablet Reorder (No AVS)     ondansetron (ZOFRAN)  4 MG tablet      simethicone (MYLICON) 125 MG chewable tablet        Physical Exam   Vital Signs: BP (!) 143/81 (BP Location: Left arm, Patient Position: Sitting, Cuff Size: Adult Large)   Pulse 100   Wt 100.7 kg (222 lb)   SpO2 98%   BMI 34.77 kg/m      GENERAL APPEARANCE: alert and no distress  HENT: mouth without ulcers or lesions  LYMPHATICS: no cervical or supraclavicular nodes  RESP: lungs clear to auscultation - no rales, rhonchi or wheezes  CV: regular rhythm, normal rate, no rub, no murmur  EDEMA: no LE edema bilaterally  ABDOMEN: soft, nondistended, nontender, bowel sounds normal  MS: extremities normal - no gross deformities noted, no evidence of inflammation in joints, no muscle tenderness  SKIN: no rash  NEURO: normal strength and tone, sensory exam grossly normal, mentation intact and speech normal  PSYCH: mentation appears normal and affect normal/bright  TX KIDNEY: normal  DIALYSIS ACCESS: none    Data         Latest Ref Rng & Units 8/21/2023     9:15 AM 8/17/2023     7:20 AM 8/14/2023     7:24 AM   Renal   Sodium 136 - 145 mmol/L 140  139  141    K 3.4 - 5.3 mmol/L 4.1  4.1  4.1    Cl 98 - 107 mmol/L 104  104  105    Cl (external) 98 - 107 mmol/L 104  104  105    CO2 22 - 29 mmol/L 24  22  21    Urea Nitrogen 8.0 - 23.0 mg/dL 15.3  14.9  18.1    Creatinine 0.67 - 1.17 mg/dL 1.00  1.01  0.94    Glucose 70 - 99 mg/dL 104  98  106    Calcium 8.8 - 10.2 mg/dL 9.3  9.1  9.3    Magnesium 1.7 - 2.3 mg/dL 1.5  1.6  1.6          Latest Ref Rng & Units 8/21/2023     9:15 AM 8/17/2023     7:20 AM 8/14/2023     7:24 AM   Bone Health   Phosphorus 2.5 - 4.5 mg/dL 2.7  3.1  2.8          Latest Ref Rng & Units 8/21/2023     9:15 AM 8/17/2023     7:20 AM 8/14/2023     7:24 AM   Heme   WBC 4.0 - 11.0 10e3/uL  4.0 - 11.0 10e3/uL 2.6     2.6  2.3     2.4  3.0    Hgb 13.3 - 17.7 g/dL 8.2  7.5  7.8    Plt 150 - 450 10e3/uL 262  293  324    ABSOLUTE NEUTROPHIL 1.6 - 8.3 10e3/uL 1.6  1.7     ABSOLUTE LYMPHOCYTES 0.8 -  5.3 10e3/uL 0.3  0.3     ABSOLUTE MONOCYTES 0.0 - 1.3 10e3/uL 0.3  0.0     ABSOLUTE EOSINOPHILS 0.0 - 0.7 10e3/uL 0.1  0.2           Latest Ref Rng & Units 8/21/2023     9:15 AM 8/17/2023     7:20 AM 8/14/2023     7:24 AM   Liver   AP 40 - 129 U/L 177  188  190    TBili <=1.2 mg/dL 0.4  0.3  0.4    Bilirubin Direct 0.00 - 0.30 mg/dL <0.20  <0.20  <0.20    ALT 0 - 70 U/L 27  38  34    AST 0 - 45 U/L 18  32  32    Tot Protein 6.4 - 8.3 g/dL 5.9  5.8  6.1    Albumin 3.5 - 5.2 g/dL 3.6  3.4  3.3          Latest Ref Rng & Units 6/7/2023     3:30 AM 6/6/2023     9:08 PM 6/5/2023     4:05 PM   Pancreas   A1C <5.7 %  5.1     Amylase 28 - 100 U/L 90   153    Lipase (Roche) 13 - 60 U/L 59            Latest Ref Rng & Units 8/7/2023     7:26 AM 8/1/2023     9:24 AM 11/22/2022     8:48 AM   Iron studies   Iron 61 - 157 ug/dL 28  23  68    Iron Sat Index 15 - 46 % 11  10  31    Ferritin 31 - 409 ng/mL 440  506  429          Latest Ref Rng & Units 6/5/2023     4:05 PM 11/22/2022     8:48 AM   [facility] Txp Virology   EBV CAPSID ANTIBODY IGG No detectable antibody. Positive  Positive         Recent Labs   Lab Test 08/14/23  0724 08/17/23  0720 08/21/23  0915   DOSTAC 8/13/2023 8/16/2023 8/20/2023   TACROL 7.8 7.5 8.1     Recent Labs   Lab Test 08/07/23  0726 08/14/23  0724 08/21/23  0915   DOSMPA 8/6/2023   8:00 PM 8/13/2023   8:00 PM 8/20/2023   9:00 PM   MPACID 1.32 1.51 3.04   MPAG 91.7 79.2 80.4

## 2023-08-29 NOTE — PATIENT INSTRUCTIONS
Patient Recommendations:  - Decrease prednisone to 7.5mg daily for 3-4 weeks. If no gout flares in that time, decrease prednisone to 5mg daily indefinitely   -Decrease mycophenolate to 540mg twice daily   -Stop valcyte after September 6th  -Labs every 2 weeks for kidney transplant  -Reschedule 10/31/23 appointment sometime in early November with transplant nephrology    Transplant Patient Information  Your Post Transplant Coordinator is: Angelita Torres  For non urgent items, we encourage you to contact your coordinator/care team online via [employer]  You and your care team can also contact your transplant coordinator Monday - Friday, 8am - 5pm at [phone] (Option 2 to reach the coordinator or Option 4 to schedule an appointment).  After hours for urgent matters, please call [facility] at [phone].

## 2023-08-29 NOTE — NURSING NOTE
Chief Complaint   Patient presents with    RECHECK     AKT, post kidney transplant     BP (!) 143/81 (BP Location: Left arm, Patient Position: Sitting, Cuff Size: Adult Large)   Pulse 100   Wt 100.7 kg (222 lb)   SpO2 98%   BMI 34.77 kg/m    Lolis CASSIDY

## 2023-08-30 ENCOUNTER — TELEPHONE (OUTPATIENT)
Dept: TRANSPLANT | Facility: CLINIC | Age: 68
End: 2023-08-30
Payer: COMMERCIAL

## 2023-08-30 DIAGNOSIS — Z94.4 LIVER REPLACED BY TRANSPLANT (H): Primary | ICD-10-CM

## 2023-08-30 DIAGNOSIS — K70.30 ALCOHOLIC CIRRHOSIS (H): ICD-10-CM

## 2023-08-30 DIAGNOSIS — M1A.9XX1 TOPHACEOUS GOUT: Primary | ICD-10-CM

## 2023-08-30 DIAGNOSIS — Z94.0 KIDNEY REPLACED BY TRANSPLANT: ICD-10-CM

## 2023-08-30 DIAGNOSIS — Z94.4 LIVER TRANSPLANT RECIPIENT (H): ICD-10-CM

## 2023-08-30 DIAGNOSIS — Z94.0 KIDNEY REPLACED BY TRANSPLANT: Primary | ICD-10-CM

## 2023-08-30 DIAGNOSIS — Z13.220 LIPID SCREENING: ICD-10-CM

## 2023-08-30 LAB
BKV DNA # SPEC NAA+PROBE: NOT DETECTED COPIES/ML
MYCOPHENOLATE SERPL LC/MS/MS-MCNC: 1.38 MG/L (ref 1–3.5)
MYCOPHENOLATE-G SERPL LC/MS/MS-MCNC: 73.6 MG/L (ref 30–95)
TME LAST DOSE: NORMAL H
TME LAST DOSE: NORMAL H
URATE SERPL-MCNC: 5.2 MG/DL (ref 3.4–7)

## 2023-08-30 RX ORDER — TACROLIMUS 1 MG/1
3 CAPSULE ORAL 2 TIMES DAILY
Qty: 540 CAPSULE | Refills: 3 | Status: SHIPPED | OUTPATIENT
Start: 2023-08-30 | End: 2023-09-26

## 2023-08-30 NOTE — TELEPHONE ENCOUNTER
ISSUE:   Tacrolimus IR level 6.2 on 8/29, goal 8, dose 3 mg AM, 2 mg PM.    PLAN:   Please call patient and confirm this was an accurate 12-hour trough. Verify Tacrolimus IR dose 3 mg AM, 2 mg PM. Confirm no new medications or illness. Confirm no missed doses. If accurate trough and accurate dose, increase Tacrolimus IR dose to 3 mg BID and repeat labs in 1 weeks    OUTCOME:   Spoke with patient, they confirm accurate trough level and current dose 3 mg AM, 2 mg PM. Patient confirmed dose change to 3 mg BID and to repeat labs in 1 weeks. Orders sent to preferred pharmacy for dose change and lab for repeat labs. Patient voiced understanding of plan.     Apoorva and Casey are working on where Casey will get labs done while they are out of town in October. They will let us know where to send order to in the next couple weeks. Casey said he is feeling well, no complaints.

## 2023-08-30 NOTE — TELEPHONE ENCOUNTER
Subhash Dutta MD    I decreased MPA to 540mg bid due to high level and loose stools/low WBC.    Please recheck MPA level in 1-2 weeks.    Please obtain monthly U/A and UPCR to check for ANCA recurrence.    I decreased pred to 7.5mg daily x3-4 weeks, then would decrease to 5mg daily if no gout symptoms. Would keep on 5mg daily indefinitely given how long he has been on pred.    I asked to reschedule txp neph appt to early November from 10/31 with me.    He is going out of town x3 weeks in October, couldn't really convince them otherwise. I asked that we get labs once midway through trip at [place] or [place]. Please help arrange.

## 2023-08-31 ENCOUNTER — INFUSION THERAPY VISIT (OUTPATIENT)
Dept: INFUSION THERAPY | Facility: CLINIC | Age: 68
End: 2023-08-31
Attending: INTERNAL MEDICINE
Payer: COMMERCIAL

## 2023-08-31 ENCOUNTER — THERAPY VISIT (OUTPATIENT)
Dept: PHYSICAL THERAPY | Facility: REHABILITATION | Age: 68
End: 2023-08-31
Payer: COMMERCIAL

## 2023-08-31 ENCOUNTER — PATIENT OUTREACH (OUTPATIENT)
Dept: CARE COORDINATION | Facility: CLINIC | Age: 68
End: 2023-08-31

## 2023-08-31 VITALS
SYSTOLIC BLOOD PRESSURE: 149 MMHG | DIASTOLIC BLOOD PRESSURE: 88 MMHG | HEART RATE: 111 BPM | TEMPERATURE: 98.2 F | OXYGEN SATURATION: 100 % | RESPIRATION RATE: 18 BRPM

## 2023-08-31 DIAGNOSIS — N18.2 CKD (CHRONIC KIDNEY DISEASE), STAGE II: ICD-10-CM

## 2023-08-31 DIAGNOSIS — Z94.4 LIVER TRANSPLANT RECIPIENT (H): ICD-10-CM

## 2023-08-31 DIAGNOSIS — N18.2 ANEMIA IN STAGE 2 CHRONIC KIDNEY DISEASE: Primary | ICD-10-CM

## 2023-08-31 DIAGNOSIS — Z74.09 IMPAIRED FUNCTIONAL MOBILITY, BALANCE, GAIT, AND ENDURANCE: Primary | ICD-10-CM

## 2023-08-31 DIAGNOSIS — Z94.0 KIDNEY TRANSPLANT RECIPIENT: ICD-10-CM

## 2023-08-31 DIAGNOSIS — D63.1 ANEMIA IN STAGE 2 CHRONIC KIDNEY DISEASE: Primary | ICD-10-CM

## 2023-08-31 DIAGNOSIS — Z94.0 KIDNEY REPLACED BY TRANSPLANT: ICD-10-CM

## 2023-08-31 PROCEDURE — 97110 THERAPEUTIC EXERCISES: CPT | Mod: GP

## 2023-08-31 PROCEDURE — 250N000011 HC RX IP 250 OP 636: Mod: JZ,EC | Performed by: INTERNAL MEDICINE

## 2023-08-31 PROCEDURE — 96372 THER/PROPH/DIAG INJ SC/IM: CPT | Performed by: INTERNAL MEDICINE

## 2023-08-31 PROCEDURE — 97750 PHYSICAL PERFORMANCE TEST: CPT | Mod: GP

## 2023-08-31 RX ORDER — ALBUTEROL SULFATE 0.83 MG/ML
2.5 SOLUTION RESPIRATORY (INHALATION)
Status: DISCONTINUED | OUTPATIENT
Start: 2023-08-31 | End: 2023-08-31 | Stop reason: HOSPADM

## 2023-08-31 RX ORDER — ALBUTEROL SULFATE 90 UG/1
1-2 AEROSOL, METERED RESPIRATORY (INHALATION)
Status: DISCONTINUED | OUTPATIENT
Start: 2023-08-31 | End: 2023-08-31 | Stop reason: HOSPADM

## 2023-08-31 RX ORDER — METHYLPREDNISOLONE SODIUM SUCCINATE 125 MG/2ML
125 INJECTION, POWDER, LYOPHILIZED, FOR SOLUTION INTRAMUSCULAR; INTRAVENOUS
Start: 2023-08-31

## 2023-08-31 RX ORDER — MEPERIDINE HYDROCHLORIDE 50 MG/ML
25 INJECTION INTRAMUSCULAR; INTRAVENOUS; SUBCUTANEOUS EVERY 30 MIN PRN
OUTPATIENT
Start: 2023-08-31

## 2023-08-31 RX ORDER — DIPHENHYDRAMINE HYDROCHLORIDE 50 MG/ML
50 INJECTION INTRAMUSCULAR; INTRAVENOUS
Start: 2023-08-31

## 2023-08-31 RX ORDER — EPINEPHRINE 1 MG/ML
0.3 INJECTION, SOLUTION INTRAMUSCULAR; SUBCUTANEOUS EVERY 5 MIN PRN
OUTPATIENT
Start: 2023-08-31

## 2023-08-31 RX ORDER — DIPHENHYDRAMINE HYDROCHLORIDE 50 MG/ML
50 INJECTION INTRAMUSCULAR; INTRAVENOUS
Status: DISCONTINUED | OUTPATIENT
Start: 2023-08-31 | End: 2023-08-31 | Stop reason: HOSPADM

## 2023-08-31 RX ORDER — ALBUTEROL SULFATE 90 UG/1
1-2 AEROSOL, METERED RESPIRATORY (INHALATION)
Start: 2023-08-31

## 2023-08-31 RX ORDER — ALBUTEROL SULFATE 0.83 MG/ML
2.5 SOLUTION RESPIRATORY (INHALATION)
OUTPATIENT
Start: 2023-08-31

## 2023-08-31 RX ORDER — EPINEPHRINE 1 MG/ML
0.3 INJECTION, SOLUTION INTRAMUSCULAR; SUBCUTANEOUS EVERY 5 MIN PRN
Status: DISCONTINUED | OUTPATIENT
Start: 2023-08-31 | End: 2023-08-31 | Stop reason: HOSPADM

## 2023-08-31 RX ORDER — METHYLPREDNISOLONE SODIUM SUCCINATE 125 MG/2ML
125 INJECTION, POWDER, LYOPHILIZED, FOR SOLUTION INTRAMUSCULAR; INTRAVENOUS
Status: DISCONTINUED | OUTPATIENT
Start: 2023-08-31 | End: 2023-08-31 | Stop reason: HOSPADM

## 2023-08-31 RX ORDER — MEPERIDINE HYDROCHLORIDE 50 MG/ML
25 INJECTION INTRAMUSCULAR; INTRAVENOUS; SUBCUTANEOUS EVERY 30 MIN PRN
Status: DISCONTINUED | OUTPATIENT
Start: 2023-08-31 | End: 2023-08-31 | Stop reason: HOSPADM

## 2023-08-31 RX ADMIN — DARBEPOETIN ALFA 40 MCG: 40 INJECTION, SOLUTION INTRAVENOUS; SUBCUTANEOUS at 12:06

## 2023-08-31 NOTE — PROGRESS NOTES
Anemia Management Note  SUBJECTIVE/OBJECTIVE:  Referred by Dr. Subhash Dutta on 2023  Primary Diagnosis: Anemia of Other Chronic Illness (D63.8)     Secondary Diagnosis:  Organ or tissue replaced by transplant, kidney (Z94.0)  Kidney Tx: 2023  Hgb goal range:  9-10  Epo/Darbo: Aranesp  40 mcg  every two weeks for Hgb <10.0. In clinic  *If Aranesp is ordered dose at 0.45mcg/kg  Iron regimen:  NA  Labs : 2024  Recent FEDERICO use, transfusion, IV iron: Mircera   RX/TX plans : 2024     *[facility]; [phone]      Hx of DVT (2023), Afib, anticoagulate.      Consent to Communicate:  OK to speak with Nabila Quinones (wife) per consent to communicate dated 10/27/2022. No Boxes Checked on Consent to Communicate.            Latest Ref Rng & Units 8/10/2023     7:25 AM 2023     7:24 AM 2023     7:20 AM 2023    10:00 AM 2023     9:15 AM 2023    10:14 AM 2023    12:00 PM   Anemia   FEDERICO Dose     40mcg   40mcg   Hemoglobin 13.3 - 17.7 g/dL 7.4  7.8  7.5   8.2  8.6       BP Readings from Last 3 Encounters:   23 (!) 149/88   23 (!) 143/81   23 124/80     Wt Readings from Last 2 Encounters:   23 100.7 kg (222 lb)   23 101.1 kg (222 lb 12.8 oz)           ASSESSMENT:  Hgb:Not at goal but improving - recommend dose and continue current regimen  TSat: Due for iron studies 4 weeks after last IV iron infusion Ferritin: Due for iron studies 4 weeks after last IV iron infusion    PLAN:  Dose with aranesp and RTC for hgb then aranesp if needed in 2 week(s). First Injectafer scheduled for 23.    Orders needed to be renewed (for next follow-up date) in EPIC: None    Iron labs due:  Scheduled for Injectafer on 23    Plan discussed with:  No call, chart review       NEXT FOLLOW-UP DATE:  23    Molly Fenton RN   Anemia Services  [facility]  [email or web address]   Office : [phone]  Fax: [phone]

## 2023-08-31 NOTE — PROGRESS NOTES
PLAN  Continue therapy per current plan of care.    Beginning/End Dates of Progress Note Reporting Period:  07/28/2023 to 08/31/2023    Referring Provider:  Lashay Rachel           08/31/23 0500   Appointment Info   Signing clinician's name / credentials Syd Grove, YAMILA   Visits Used 4   Medical Diagnosis Kidney transplant recipient  Liver transplant recipient (H)   PT Tx Diagnosis Impaired functional mobility, gait, balance, and endurance   Progress Note/Certification   Start of Care Date 07/28/23   Onset of illness/injury or Date of Surgery 06/06/23  (Date of surgery)   Therapy Frequency 1-2 times/week   Predicted Duration 10 weeks   Certification date from 07/28/23   Certification date to 10/06/23   Progress Note Due Date 10/06/23   Progress Note Completed Date 08/31/23   PT Goal 1   Goal Identifier LEFS - Short Term   Goal Description Casey will demonstrate significantly improved function as evidenced by an improved LEFS score of at least 17/80.   Goal Progress 39/80   Target Date 08/25/23   Date Met 08/31/23   PT Goal 2   Goal Identifier LEFS - Long Term   Goal Description Casey will demonstrate significantly improved function as evidenced by an improved LEFS score of at least 50/80.   Goal Progress 39/80   Target Date 10/06/23   PT Goal 3   Goal Identifier Walking   Goal Description Casey will demonstrate improved tolerance to and safety and independence with functional mobility as evidenced by his ability to walk for up to 20 minutes without need for assistive device.   Goal Progress 3 minutes with 4WW   Target Date 10/06/23   PT Goal 4   Goal Identifier TUG   Goal Description Casey will demonstrate improved functional mobility and a decreased fall risk as evidenced by an improved (decreased) TUG time of less than 13 seconds.   Goal Progress 9.82 seconds without assistive device   Target Date 10/06/23   Date Met 08/31/23   PT Goal 5   Goal Identifier 30 Second Sit-to-stand   Goal Description Casey will demonstrate  improved lower extremity strength and increased tolerance to functional transfers as evidenced by an improved 30 second sit-to-stand score of at least 10 reps without upper extremity assist.   Goal Progress 10 reps with no UE assist   Target Date 10/06/23   PT Goal 6   Goal Identifier 6 Minute Walk Test   Goal Description Casey will demonstrate improved functional capacity as evidenced by an improved 6 MWT distance of at least 1100 feet.   Goal Progress 278.4 feet (stopped at 1:48; 4WW)   Target Date 10/06/23   PT Goal 7   Goal Identifier FGA   Goal Description Casey will demonstrate improved safety and independence with functional mobility and a decreased fall risk as evidenced by an improved FGA score of at least 18/30.   Goal Progress 17/30 (no assistive device)   Target Date 10/06/23   Subjective Report   Subjective Report Casey reports that he is able to ascend the stairs at home without taking breaks and general activities are getting easier.  He has been walking without his walker around his home  and reports no falls.   Objective Measures   Objective Measures Objective Measure 1;Objective Measure 2;Objective Measure 3;Objective Measure 4;Objective Measure 5;Objective Measure 6   Objective Measure 1   Objective Measure TUG   Details 9.82 no walker   Objective Measure 2   Objective Measure 30 Second Sit-to-stand   Details 10 reps with no UE assist   Objective Measure 3   Objective Measure Gait   Details Initial evaluation: 108.7 feet with standard walker (notably winded afterward)   Objective Measure 4   Objective Measure FGA   Details 17/30 (no assistive device)   Objective Measure 5   Objective Measure 6 Minute Walk Test   Details 278.4 feet (stopped at 1:48; 4WW)   Objective Measure 6   Objective Measure Stairs   Details 8/3/23: Up to 8 stairs with break prison; CGA; gait belt used; significantly winded; facing railing with bilateral upper extremity assist.   Therapeutic Procedure/Exercise   Therapeutic  Procedures: strength, endurance, ROM, flexibillity minutes (15531) 17   Ther Proc 1 Recumbent bike   Ther Proc 1 - Details 2 minutes   Ther Proc 2 Standing hip extension and abduction   Ther Proc 2 - Details 2x10 B each direction   Ther Proc 3 Standing marching   Ther Proc 3 - Details 2 x 10 bilateral   Skilled Intervention Exercises to improve general strength and endurance   Patient Response/Progress Casey continues to demonstrate quick fatigue and need for rest breaks due to impaired functional capacity, but otherwise he tolerated all exercises and progressions well.   Physical Performance Test/measures   Physical Performance Test/Measurement, Minutes (16416) 23   Physical Performance Test/Measurement Details FGA, 6 MWT, TUG,and 30s STS   Patient Response/Progress See objective measures   Education   Learner/Method Patient;Significant Other;Listening;Demonstration;Pictures/Video;No Barriers to Learning   Plan   Home program See PTRx.   Plan for next session Continue to progress general stength and endurance exercises. Continue balance and gait training as appropriate.   Comments   Comments Assessment: Casey returns for third follow-up of outpatient physical therapy following a liver and kidney transplant procedure on 6/6/23. He reports improved activity tolerance, and he was able to ascend his stairs at home without breaks. He reports walking at home without a walker with no falls. He improved his TUG and FGA scores to 9.82s and 17/30 respectively, which demonstrates reduced, though still remaining, fall risk and improved dynamic balance and mobility. He also improved his 30 second sit to stand and 6 minute walk test with scores of 10 and 278.4 feet respectively, which demonstrates improved lower extremity strength and endurance. His daily functioning has significantly improved with a LEFS score of 39/80. Today's session included standing hip strength exercises to improve his functional mobility and lower extremity  strength. He will continue to benefit from physical therapy to improve his safety and independence with functional activities and mobility.   Total Session Time   Timed Code Treatment Minutes 40   Total Treatment Time (sum of timed and untimed services) 40

## 2023-08-31 NOTE — PROGRESS NOTES
Infusion Nursing Note:  Damion Quinones presents today for aranesp injection.    Patient seen by provider today: No   present during visit today: Not Applicable.    Note: BP (!) 149/88 (Patient Position: Sitting)   Pulse 111   Temp 98.2  F (36.8  C)   Resp 18   SpO2 100% .  Aranesp given.    Intravenous Access:  No Intravenous access/labs at this visit.    Treatment Conditions:  Lab Results   Component Value Date    HGB 8.6 (L) 08/29/2023    WBC 4.0 08/29/2023    ANEU 1.6 08/21/2023    ANEUTAUTO 7.7 07/20/2023     08/29/2023        Hemoglobin 8.6, therefore aranesp ordered.      Post Infusion Assessment:  Patient tolerated injection without incident.       Discharge Plan:   Discharge instructions reviewed with: Patient.  Patient and/or family verbalized understanding of discharge instructions and all questions answered.  Patient discharged in stable condition accompanied by: self.  Departure Mode: Ambulatory.      Timmy Delacruz RN

## 2023-09-05 ENCOUNTER — LAB (OUTPATIENT)
Dept: LAB | Facility: CLINIC | Age: 68
End: 2023-09-05
Payer: COMMERCIAL

## 2023-09-05 ENCOUNTER — ANCILLARY PROCEDURE (OUTPATIENT)
Dept: CARDIOLOGY | Facility: CLINIC | Age: 68
End: 2023-09-05
Attending: INTERNAL MEDICINE
Payer: COMMERCIAL

## 2023-09-05 ENCOUNTER — OFFICE VISIT (OUTPATIENT)
Dept: GASTROENTEROLOGY | Facility: CLINIC | Age: 68
End: 2023-09-05
Attending: INTERNAL MEDICINE
Payer: COMMERCIAL

## 2023-09-05 ENCOUNTER — TELEPHONE (OUTPATIENT)
Dept: TRANSPLANT | Facility: CLINIC | Age: 68
End: 2023-09-05

## 2023-09-05 VITALS
SYSTOLIC BLOOD PRESSURE: 134 MMHG | OXYGEN SATURATION: 99 % | WEIGHT: 220 LBS | HEART RATE: 109 BPM | BODY MASS INDEX: 34.46 KG/M2 | DIASTOLIC BLOOD PRESSURE: 89 MMHG

## 2023-09-05 DIAGNOSIS — C22.0 HCC (HEPATOCELLULAR CARCINOMA) (H): ICD-10-CM

## 2023-09-05 DIAGNOSIS — Z94.4 LIVER REPLACED BY TRANSPLANT (H): ICD-10-CM

## 2023-09-05 DIAGNOSIS — R53.83 LETHARGY: Primary | ICD-10-CM

## 2023-09-05 DIAGNOSIS — N18.2 ANEMIA IN STAGE 2 CHRONIC KIDNEY DISEASE: ICD-10-CM

## 2023-09-05 DIAGNOSIS — R53.83 LETHARGY: ICD-10-CM

## 2023-09-05 DIAGNOSIS — K75.81 NASH (NONALCOHOLIC STEATOHEPATITIS): ICD-10-CM

## 2023-09-05 DIAGNOSIS — E03.9 HYPOTHYROIDISM, UNSPECIFIED TYPE: Primary | ICD-10-CM

## 2023-09-05 DIAGNOSIS — Z79.899 ENCOUNTER FOR LONG-TERM CURRENT USE OF MEDICATION: ICD-10-CM

## 2023-09-05 DIAGNOSIS — Z94.0 KIDNEY REPLACED BY TRANSPLANT: ICD-10-CM

## 2023-09-05 DIAGNOSIS — I48.21 PERMANENT ATRIAL FIBRILLATION WITH RAPID VENTRICULAR RESPONSE (H): ICD-10-CM

## 2023-09-05 DIAGNOSIS — Z12.11 COLON CANCER SCREENING: ICD-10-CM

## 2023-09-05 DIAGNOSIS — D63.1 ANEMIA IN STAGE 2 CHRONIC KIDNEY DISEASE: ICD-10-CM

## 2023-09-05 DIAGNOSIS — Z48.298 AFTERCARE FOLLOWING ORGAN TRANSPLANT: ICD-10-CM

## 2023-09-05 LAB
ALBUMIN SERPL BCG-MCNC: 3.8 G/DL (ref 3.5–5.2)
ALP SERPL-CCNC: 169 U/L (ref 40–129)
ALT SERPL W P-5'-P-CCNC: 42 U/L (ref 0–70)
ANION GAP SERPL CALCULATED.3IONS-SCNC: 13 MMOL/L (ref 7–15)
AST SERPL W P-5'-P-CCNC: 29 U/L (ref 0–45)
BILIRUB DIRECT SERPL-MCNC: 0.22 MG/DL (ref 0–0.3)
BILIRUB SERPL-MCNC: 0.5 MG/DL
BUN SERPL-MCNC: 19.6 MG/DL (ref 8–23)
CALCIUM SERPL-MCNC: 9.2 MG/DL (ref 8.8–10.2)
CHLORIDE SERPL-SCNC: 101 MMOL/L (ref 98–107)
CREAT SERPL-MCNC: 1.12 MG/DL (ref 0.67–1.17)
DEPRECATED HCO3 PLAS-SCNC: 25 MMOL/L (ref 22–29)
DONOR IDENTIFICATION: NORMAL
DSA COMMENTS: NORMAL
DSA PRESENT: NO
DSA TEST METHOD: NORMAL
ERYTHROCYTE [DISTWIDTH] IN BLOOD BY AUTOMATED COUNT: 17.4 % (ref 10–15)
FERRITIN SERPL-MCNC: 485 NG/ML (ref 31–409)
GFR SERPL CREATININE-BSD FRML MDRD: 73 ML/MIN/1.73M2
GLUCOSE SERPL-MCNC: 118 MG/DL (ref 70–99)
HCT VFR BLD AUTO: 27.7 % (ref 40–53)
HGB BLD-MCNC: 8 G/DL (ref 13.3–17.7)
IRON BINDING CAPACITY (ROCHE): 258 UG/DL (ref 240–430)
IRON SATN MFR SERPL: 12 % (ref 15–46)
IRON SERPL-MCNC: 32 UG/DL (ref 61–157)
MAGNESIUM SERPL-MCNC: 1.5 MG/DL (ref 1.7–2.3)
MCH RBC QN AUTO: 25.6 PG (ref 26.5–33)
MCHC RBC AUTO-ENTMCNC: 28.9 G/DL (ref 31.5–36.5)
MCV RBC AUTO: 89 FL (ref 78–100)
ORGAN: NORMAL
PHOSPHATE SERPL-MCNC: 3 MG/DL (ref 2.5–4.5)
PLATELET # BLD AUTO: 326 10E3/UL (ref 150–450)
POTASSIUM SERPL-SCNC: 3.7 MMOL/L (ref 3.4–5.3)
PROT SERPL-MCNC: 6 G/DL (ref 6.4–8.3)
RBC # BLD AUTO: 3.13 10E6/UL (ref 4.4–5.9)
SA 1 CELL: NORMAL
SA 1 TEST METHOD: NORMAL
SA 2 CELL: NORMAL
SA 2 TEST METHOD: NORMAL
SA1 HI RISK ABY: NORMAL
SA1 MOD RISK ABY: NORMAL
SA2 HI RISK ABY: NORMAL
SA2 MOD RISK ABY: NORMAL
SODIUM SERPL-SCNC: 139 MMOL/L (ref 136–145)
T4 FREE SERPL-MCNC: 0.76 NG/DL (ref 0.9–1.7)
TACROLIMUS BLD-MCNC: 6.8 UG/L (ref 5–15)
TME LAST DOSE: NORMAL H
TME LAST DOSE: NORMAL H
TSH SERPL DL<=0.005 MIU/L-ACNC: 7.03 UIU/ML (ref 0.3–4.2)
UNACCEPTABLE ANTIGENS: NORMAL
WBC # BLD AUTO: 4.7 10E3/UL (ref 4–11)
ZZZSA 1  COMMENTS: NORMAL
ZZZSA 2 COMMENTS: NORMAL

## 2023-09-05 PROCEDURE — 85027 COMPLETE CBC AUTOMATED: CPT | Performed by: PATHOLOGY

## 2023-09-05 PROCEDURE — 84439 ASSAY OF FREE THYROXINE: CPT | Performed by: PATHOLOGY

## 2023-09-05 PROCEDURE — 84443 ASSAY THYROID STIM HORMONE: CPT | Performed by: PATHOLOGY

## 2023-09-05 PROCEDURE — 83735 ASSAY OF MAGNESIUM: CPT | Performed by: PATHOLOGY

## 2023-09-05 PROCEDURE — 80053 COMPREHEN METABOLIC PANEL: CPT | Performed by: PATHOLOGY

## 2023-09-05 PROCEDURE — 87799 DETECT AGENT NOS DNA QUANT: CPT | Performed by: INTERNAL MEDICINE

## 2023-09-05 PROCEDURE — 36415 COLL VENOUS BLD VENIPUNCTURE: CPT | Performed by: PATHOLOGY

## 2023-09-05 PROCEDURE — 99000 SPECIMEN HANDLING OFFICE-LAB: CPT | Performed by: PATHOLOGY

## 2023-09-05 PROCEDURE — G0463 HOSPITAL OUTPT CLINIC VISIT: HCPCS | Performed by: INTERNAL MEDICINE

## 2023-09-05 PROCEDURE — 83550 IRON BINDING TEST: CPT | Performed by: PATHOLOGY

## 2023-09-05 PROCEDURE — 84100 ASSAY OF PHOSPHORUS: CPT | Performed by: PATHOLOGY

## 2023-09-05 PROCEDURE — 83540 ASSAY OF IRON: CPT | Performed by: PATHOLOGY

## 2023-09-05 PROCEDURE — 82248 BILIRUBIN DIRECT: CPT | Performed by: PATHOLOGY

## 2023-09-05 PROCEDURE — 99215 OFFICE O/P EST HI 40 MIN: CPT | Performed by: INTERNAL MEDICINE

## 2023-09-05 PROCEDURE — 82728 ASSAY OF FERRITIN: CPT | Performed by: PATHOLOGY

## 2023-09-05 PROCEDURE — 80197 ASSAY OF TACROLIMUS: CPT | Performed by: INTERNAL MEDICINE

## 2023-09-05 RX ORDER — LEVOTHYROXINE SODIUM 88 UG/1
88 TABLET ORAL DAILY
Qty: 30 TABLET | Refills: 3 | Status: SHIPPED | OUTPATIENT
Start: 2023-09-05 | End: 2023-12-28

## 2023-09-05 ASSESSMENT — PAIN SCALES - GENERAL: PAINLEVEL: NO PAIN (0)

## 2023-09-05 NOTE — TELEPHONE ENCOUNTER
ISSUE:   Tacrolimus IR level 6.8 on 9/5, goal 8, dose 3 mg BID.    PLAN:   Please call patient and confirm this was an accurate 12-hour trough. Verify Tacrolimus IR dose 3 mg BID, he was still taking 3 mg AM, 2 mg PM. Confirm no new medications or illness. Confirm no missed doses. If accurate trough and accurate dose, increase Tacrolimus IR dose to 3 mg BID and repeat labs in 1 week.    OUTCOME:   Spoke with patient, they confirm accurate trough level and current dose 3 mg AM, 2 mg PM. Patient confirmed dose change to 3 mg BID and to repeat labs in 1 weeks. Orders sent to preferred pharmacy for dose change and lab for repeat labs. Patient voiced understanding of plan.     Apoorva had not increased dose from 3 mg AM, 2 mg PM to 3 mg BID last week. Will increase tac dose to 3 mg BID now. Labs again in 2 weeks.

## 2023-09-05 NOTE — NURSING NOTE
Chief Complaint   Patient presents with    RECHECK     3 month follow up from liver transplant // tx surgery on 6.6.23     /89 (BP Location: Right arm, Patient Position: Sitting, Cuff Size: Adult Regular)   Pulse 109   Wt 99.8 kg (220 lb)   SpO2 99%   BMI 34.46 kg/m    Lolis CASSIDY

## 2023-09-05 NOTE — LETTER
9/5/2023         RE: Damion Quinones   [address] [facility] [address]        Dear Colleague,    Thank you for referring your patient, Damion Quinones, to the [facility] [place]. Please see a copy of my visit note below.    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 PMHx also includes CAD, alcohol overuse, obesity, psoriatic arthritis, paroxysmal atrial fibrillation, history of DVT and HTN.    #. 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 HCC noted on explant: moderately differentiated, 2.8cm. RETREAT SCORE: 1 (2.8cm tumor, AFP < 20, no vascular invasion)  #. Iron deficiency anemia  #. Protein calorie malnutrition, mild     Patient is doing well post transplant with good liver and kidney graft function while on triple therapy- MMF, tacrolimus and prednisone. Ongoing mild alkaline phosphatase elevation. Patient with duct-to-duct anastomosis and no bile duct stent.     His energy level and strength are slowly improving; suspect he will have ongoing improvement in the setting of ongoing PT.    No s/sx of bleeding, but iron studies suggest iron deficiency. S/p aranesp, last 8/31/2023. Plan for IV iron infusions.    Given HCC noted on explant, plan for HCC surveillance q 6 months with CT chest, abdomen and pelvis + AFP. RETREAT SCORE: 1    Plan  -- IV iron infusions, scheduled   -- Tacrolimus immediate release (goal 8-10), Mycophenolic acid 540mg BID and prednisone 7.5 mg daily; monitoring of immunosuppression for efficacy and toxicity per protocol   * Agree with nephrology: decrease prednisone from 7.5mg to 5mg daily as able   --  Continue statin given CAD; off aspirin given on apixiban per cardiology  -- HCC surveillance given HCC on explant: CT chest + CT abdomen/pelvis and AFP every 6 months (due 12/2023)   -- Continue ursodiol 250mg BID; stop at 6 months  -- Prophylaxis   * PJP: Sulfa/TMP (Bactrim)   * CMV: Valganciclovir (Valcyte), 450mg daily x3m (stop 9/6/23)  -- Continue complete alcohol abstinence  -- Patient to follow up with Dr. Clifton      Health Maintenance:  -- CRC Surveillance: Repeat due in 2023 given 5 polyps removed in 2020; path: tubular adenomas; ordered   -- Skin Checks: due 6/2024  -- Lipids: due 6/2023    RTC: 3 months    Lolis Bermudez MD (Lizzie)   of Medicine  Advanced & Transplant Hepatology  [facility]    I spent 45 minutes on this encounter performing the following: reviewing the patient's medical record (clinic visits, hospital records, lab results, imaging and procedural documentation), history taking, physical exam and documentation. I also spent part of the time in coordination of care and counseling.     HPI:  DBD LT (and kidney) 6/6/2023 for ETOH + hemochromatosis (homozygous H63D) cirrhosis  - operation: Caval replacement. Portal vein end-to-end anastomosis. Hepatic artery: there were 2 hepatic arteries the right and left; the right is connected to the splenic artery stump. Bile duct end-to-end anastomosis  - explant: 2.8cm HCC (moderately differentiated with no vascular or extra-hepatic extension) in the setting of MASLD cirrhosis  - post-op course   * RTOR on 6/6/203 with concern for low flow in the renal graft - ex-lap, washout, closure with healthy appearing graft with intact arterial and venous flow.   - immunosuppression: Tacrolimus + MMF + prednisone    He presents with his wife Apoorva.    He has been at home for a month and reports improvement in his energy level and strength.  He reports that he still has lethargy at times and needs to sit down more  often.  He has been trying to work out on his exercise bike every couple of days for about 3 miles at a time which takes him about 15 minutes.  He did get an injury on the bike last week and has not been on the bike since.  He notices that he is worn out more quickly than he was prior to getting sick with liver disease. He engages in PT once per week    He reports some shortness of breath with exertion but not at rest. Denies fevers, chills or cough. Denies chest pain with exertion.     He has mild stomach upset/cramping when he wakes up in the morning but this resolves with bowel movements. 1-2 BM per day without blood. Mild nausea, intermittent, no clear trigger. Currently on magnesium 800mg BID.    Currently on prednisone 7.5mg per day; plan to see rheumatology tomorrow.     No epistaxis, gum bleeding, hematuria, melena or hematochezia. Remains on apixiban.     Planning to go to [place] this weekend to speak at a conference. Planning to visit grandchildren in [place] in the fall.     8/1/2023: S/P Flexible cystoscopy and ureteral stent removal     Medical hx Surgical hx   Past Medical History:   Diagnosis Date    Alcoholic cirrhosis of liver with ascites (H) 10/11/2019    ANCA-associated vasculitis (H) 2022    C. difficile colitis     Coronary artery disease     [facility] 4/2023 - complete occlusion of RCA    ESRD (end stage renal disease) on dialysis (H)     Gout     History of hemochromatosis 10/11/2019    Hypertension     IgA nephropathy     Obesity     PAF (paroxysmal atrial fibrillation) (H)     Pre-diabetes 2023    Psoriatic arthritis (H)     RA (rheumatoid arthritis) (H)     Status post kidney transplant 06/06/2023    Induction with thymoglobulin 4mg/kg, + DSA CW9    Status post liver transplantation (H) 06/06/2023      Past Surgical History:   Procedure Laterality Date    APPENDECTOMY      Removed at 18 Years Old     BENCH KIDNEY  06/06/2023    Procedure: Bench kidney;  Surgeon: Chad Cliftno  MD;  Location: [facility] OR    BENCH LIVER  2023    Procedure: Bench liver;  Surgeon: Chad Clifton MD;  Location: [facility] OR    COLONOSCOPY       at [facility].     CV CORONARY ANGIOGRAM N/A 2023    Procedure: Coronary Angiogram;  Surgeon: Pablo Araujo MD;  Location:  HEART CARDIAC CATH LAB    EYE SURGERY Bilateral     Cataract    H STATISTIC PICC LINE INSERTION >5YR, FAILED Left 2023    Unable to advance catheter over the axillary area    HERNIA REPAIR      History of bilateral inguinal hernia repair: 10/28/2014. Open hernia repair: 10/2017. Abdominal wound exploration and debridement 2017    INSERT SHUNT PORTAL TRANSJUGULAR INTRAHEPTIC  2022    IR CVC NON TUNNEL LINE REMOVAL  7/3/2023    IR CVC NON TUNNEL PLACEMENT > 5 YRS  2023    IR CVC TUNNEL PLACEMENT > 5 YRS OF AGE  2023    IR PICC PLACEMENT > 5 YRS OF AGE  2023    IR RENAL BIOPSY RIGHT  2023    RETURN LIVER TRANSPLANT N/A 2023    Procedure: Return liver transplant. Intra-operative ultrasound;  Surgeon: Chad Clifton MD;  Location: [facility] OR    TRANSPLANT KIDNEY RECIPIENT  DONOR N/A 2023    Procedure: Transplant kidney recipient  donor, ureteral stent placement;  Surgeon: Chad Clifton MD;  Location: [facility] OR    TRANSPLANT LIVER RECIPIENT  DONOR N/A 2023    Procedure: Transplant liver recipient  donor;  Surgeon: Chad Clifton MD;  Location: [facility] OR   + knee replacements x 2       Medications  Current Outpatient Medications   Medication Sig Dispense Refill    Alcohol Swabs PADS Use to swab the area of the injection or quyen as directed Per insurance coverage 100 each 0    allopurinol (ZYLOPRIM) 100 MG tablet Take 300 mg by mouth daily 180 tablet 3    apixaban ANTICOAGULANT (ELIQUIS ANTICOAGULANT) 5 MG tablet Take 1 tablet (5 mg) by mouth 2 times daily 180 tablet 3    atorvastatin (LIPITOR) 20 MG tablet Take 1 tablet (20  mg) by mouth every evening 30 tablet 0    blood glucose (NO BRAND SPECIFIED) lancets standard To use to test glucose level in the blood Use to test blood sugar  4  times daily as directed. To accompany glucose monitor brands per insurance coverage. 100 each 0    blood glucose (NO BRAND SPECIFIED) test strip Use to test blood sugar 3 times daily or as directed. 30 strip 3    blood glucose (NO BRAND SPECIFIED) test strip To use to test glucose level in the blood Use to test blood sugar  4 times daily as directed. To accompany glucose monitor brands per insurance coverage. 4 strip 1    blood glucose monitoring (NO BRAND SPECIFIED) meter device kit Use as directed Per insurance coverage 1 kit 0    magnesium oxide (MAG-OX) 400 MG tablet Take 2 tablets (800 mg) by mouth 2 times daily 60 tablet 0    metoprolol tartrate (LOPRESSOR) 25 MG tablet Take 0.5 tablets (12.5 mg) by mouth 2 times daily 180 tablet 3    multivitamin w/minerals (THERA-VIT-M) tablet Take 1 tablet by mouth daily 30 tablet 0    MYFORTIC (BRAND) 180 MG EC tablet Take 3 tablets (540 mg) by mouth 2 times daily 240 tablet 3    pantoprazole (PROTONIX) 40 MG EC tablet Take 1 tablet (40 mg) by mouth every morning (before breakfast) 30 tablet 0    predniSONE (DELTASONE) 5 MG tablet Take 1.5 tablets (7.5 mg) by mouth daily for 90 days 135 tablet 3    Sharps Container MISC Use as directed to dispose of needles, lancets and other sharps 1 each 0    sulfamethoxazole-trimethoprim (BACTRIM) 400-80 MG tablet Take 1 tablet by mouth daily 90 tablet 3    tacrolimus (GENERIC EQUIVALENT) 1 MG capsule Take 3 capsules (3 mg) by mouth 2 times daily 540 capsule 3    ursodiol (ACTIGALL) 250 MG tablet Take 1 tablet (250 mg) by mouth 2 times daily 180 tablet 3    valGANciclovir (VALCYTE) 450 MG tablet Take 1 tablet (450 mg) by mouth daily 180 tablet 0       Allergies  No Known Allergies    Family hx Social hx   Family History   Problem Relation Age of Onset    Lung Cancer Mother      Colon Cancer Father     Lung Cancer Brother     Heart Failure Brother     Kidney Disease Brother    - Brother: lung cancer in 50s  - Brother: heart disease (unclear what), renal disease (unclear what)  - Brother: colon issues (unclear what)   - Alcohol: None since prior to transplant   - Tobacco: Denies  - Drugs: Denies  -  (Apoorva), has two adult children   - Retired, previously in IT. Hasn't worked sine 2019.  - Stays active.  He has been using up recumbent bike every other day and then walking on days where he does not bike.     Review of systems  A 10-point review of systems was negative.    Examination  /89 (BP Location: Right arm, Patient Position: Sitting, Cuff Size: Adult Regular)   Pulse 109   Wt 99.8 kg (220 lb)   SpO2 99%   BMI 34.46 kg/m    Body mass index is 34.46 kg/m .    Gen-NAD  Eye- No conjunctival icterus  ENT- MMM, normal oropharynx  CVS- RRR, no murmurs  RS- CTA bilaterally  Abd- Distended. Non-tender throughout. Umbilical hernia - reducible.   Extr- 2+ radial pulses bilaterally, trace lower extremity edema bilaterally  MS- hands without clubbing  Neuro- A+Ox3  Skin- no rash. No jaundice. Duptyrens contractures bilaterally.  Ecchymoses on his arms bilaterally. Gout in right hand.   Psych- normal mood    Laboratory  BMP  Recent Labs   Lab Test 09/05/23  0702 08/29/23  1014 08/21/23  0915 08/17/23  0720    140 140 139   POTASSIUM 3.7 4.1 4.1 4.1   CHLORIDE 101 102 104 104   NAZARIO 9.2 9.5 9.3 9.1   CO2 25 24 24 22   BUN 19.6 16.8 15.3 14.9   CR 1.12 0.94 1.00 1.01   * 118* 104* 98     CBC  Recent Labs   Lab Test 09/05/23  0702 08/29/23  1014 08/21/23  0915 08/17/23  0720   WBC 4.7 4.0 2.6*  2.6* 2.4*  2.3*   RBC 3.13* 3.27* 3.05* 2.79*   HGB 8.0* 8.6* 8.2* 7.5*   HCT 27.7* 29.9* 28.5* 26.3*   MCV 89 91 93 94   MCH 25.6* 26.3* 26.9 26.9   MCHC 28.9* 28.8* 28.8* 28.5*   RDW 17.4* 17.6* 17.6* 17.5*    288 262 293     Liver Enzymes   Recent Labs   Lab Test  09/05/23  0702   PROTTOTAL 6.0*   ALBUMIN 3.8   BILITOTAL 0.5   ALKPHOS 169*   AST 29   ALT 42      INR   INR   Date Value Ref Range Status   08/17/2023 1.22 (H) 0.85 - 1.15 Final         Iron sat 41%; ferritin 896  MIGUEL A positive  Hep A IgG negative  Hep B s Ag negative  Hep B s Ab < 2.0  Hep B c Ab negative  Hep C ab negative    Hemochromatosis: negative C282Y, homozygous H63D, negative S65C    Kidney Biopsy 6/2023  Focal acute tubular necrosis  Mild peritubular capillaritis, associated with no other significant signs of active antibody-mediated rejection (g0 ptc1 v0 c4d0)   No significant signs of acute cell-mediated rejection (i0 t0 v0)   Chronic changes of the parenchyma, including:   - no global glomerulosclerosis   - focal tubular atrophy and interstitial fibrosis (<5 of the cortex)   - mild arterial and arteriolar sclerosis       (ct0 ci0 cv1 ah0 mm0 cg0)    Radiology  Abdominal US 6/2023  1.  The Doppler waveforms now appear more normal. Arterial waveforms are no longer poststenotic.  2.  Limited evaluation of the perihepatic fluid collections suggests no interval change.    Endoscopy  EGD 9/2022: Small EV, IGV1 oozing, portal HTN gastropathy, IGV2, no gastric ulcers, normal duodenum    Colonoscopy 7/31/2020:  - Four 2 to 3 mm polyps in the descending colon, in  the transverse colon and in the ascending colon, removed with a jumbo cold forceps. Resected and retrieved.  - One 8 mm polyp in the descending colon, removed with a hot snare. Resected and retrieved. Clip (MR conditional) was placed.   Pathology: Tubular adenomas       Again, thank you for allowing me to participate in the care of your patient.        Sincerely,        Lolis Bermudez MD

## 2023-09-06 ENCOUNTER — INFUSION THERAPY VISIT (OUTPATIENT)
Dept: INFUSION THERAPY | Facility: CLINIC | Age: 68
End: 2023-09-06
Attending: INTERNAL MEDICINE
Payer: COMMERCIAL

## 2023-09-06 ENCOUNTER — PATIENT OUTREACH (OUTPATIENT)
Dept: CARE COORDINATION | Facility: CLINIC | Age: 68
End: 2023-09-06

## 2023-09-06 VITALS
SYSTOLIC BLOOD PRESSURE: 144 MMHG | OXYGEN SATURATION: 98 % | RESPIRATION RATE: 16 BRPM | HEART RATE: 101 BPM | DIASTOLIC BLOOD PRESSURE: 83 MMHG | TEMPERATURE: 98.4 F

## 2023-09-06 DIAGNOSIS — N18.2 ANEMIA IN STAGE 2 CHRONIC KIDNEY DISEASE: Primary | ICD-10-CM

## 2023-09-06 DIAGNOSIS — Z94.4 LIVER REPLACED BY TRANSPLANT (H): ICD-10-CM

## 2023-09-06 DIAGNOSIS — Z94.0 KIDNEY REPLACED BY TRANSPLANT: ICD-10-CM

## 2023-09-06 DIAGNOSIS — D63.1 ANEMIA IN STAGE 2 CHRONIC KIDNEY DISEASE: Primary | ICD-10-CM

## 2023-09-06 LAB — BKV DNA # SPEC NAA+PROBE: NOT DETECTED COPIES/ML

## 2023-09-06 PROCEDURE — 258N000003 HC RX IP 258 OP 636: Performed by: INTERNAL MEDICINE

## 2023-09-06 PROCEDURE — 250N000011 HC RX IP 250 OP 636: Mod: JZ | Performed by: INTERNAL MEDICINE

## 2023-09-06 PROCEDURE — 999N000248 HC STATISTIC IV INSERT WITH US BY RN

## 2023-09-06 PROCEDURE — 96365 THER/PROPH/DIAG IV INF INIT: CPT

## 2023-09-06 RX ORDER — ALBUTEROL SULFATE 90 UG/1
1-2 AEROSOL, METERED RESPIRATORY (INHALATION)
Status: CANCELLED
Start: 2023-09-13

## 2023-09-06 RX ORDER — MEPERIDINE HYDROCHLORIDE 50 MG/ML
25 INJECTION INTRAMUSCULAR; INTRAVENOUS; SUBCUTANEOUS EVERY 30 MIN PRN
Status: CANCELLED | OUTPATIENT
Start: 2023-09-13

## 2023-09-06 RX ORDER — ALBUTEROL SULFATE 0.83 MG/ML
2.5 SOLUTION RESPIRATORY (INHALATION)
Status: DISCONTINUED | OUTPATIENT
Start: 2023-09-06 | End: 2023-09-06 | Stop reason: HOSPADM

## 2023-09-06 RX ORDER — DIPHENHYDRAMINE HYDROCHLORIDE 50 MG/ML
50 INJECTION INTRAMUSCULAR; INTRAVENOUS
Status: DISCONTINUED | OUTPATIENT
Start: 2023-09-06 | End: 2023-09-06 | Stop reason: HOSPADM

## 2023-09-06 RX ORDER — ALBUTEROL SULFATE 90 UG/1
1-2 AEROSOL, METERED RESPIRATORY (INHALATION)
Status: DISCONTINUED | OUTPATIENT
Start: 2023-09-06 | End: 2023-09-06 | Stop reason: HOSPADM

## 2023-09-06 RX ORDER — HEPARIN SODIUM,PORCINE 10 UNIT/ML
5-20 VIAL (ML) INTRAVENOUS DAILY PRN
Status: CANCELLED | OUTPATIENT
Start: 2023-09-13

## 2023-09-06 RX ORDER — METHYLPREDNISOLONE SODIUM SUCCINATE 125 MG/2ML
125 INJECTION, POWDER, LYOPHILIZED, FOR SOLUTION INTRAMUSCULAR; INTRAVENOUS
Status: CANCELLED
Start: 2023-09-13

## 2023-09-06 RX ORDER — EPINEPHRINE 1 MG/ML
0.3 INJECTION, SOLUTION INTRAMUSCULAR; SUBCUTANEOUS EVERY 5 MIN PRN
Status: DISCONTINUED | OUTPATIENT
Start: 2023-09-06 | End: 2023-09-06 | Stop reason: HOSPADM

## 2023-09-06 RX ORDER — MEPERIDINE HYDROCHLORIDE 50 MG/ML
25 INJECTION INTRAMUSCULAR; INTRAVENOUS; SUBCUTANEOUS EVERY 30 MIN PRN
Status: DISCONTINUED | OUTPATIENT
Start: 2023-09-06 | End: 2023-09-06 | Stop reason: HOSPADM

## 2023-09-06 RX ORDER — DIPHENHYDRAMINE HYDROCHLORIDE 50 MG/ML
50 INJECTION INTRAMUSCULAR; INTRAVENOUS
Status: CANCELLED
Start: 2023-09-13

## 2023-09-06 RX ORDER — EPINEPHRINE 1 MG/ML
0.3 INJECTION, SOLUTION INTRAMUSCULAR; SUBCUTANEOUS EVERY 5 MIN PRN
Status: CANCELLED | OUTPATIENT
Start: 2023-09-13

## 2023-09-06 RX ORDER — HEPARIN SODIUM (PORCINE) LOCK FLUSH IV SOLN 100 UNIT/ML 100 UNIT/ML
5 SOLUTION INTRAVENOUS
Status: CANCELLED | OUTPATIENT
Start: 2023-09-13

## 2023-09-06 RX ORDER — ALBUTEROL SULFATE 0.83 MG/ML
2.5 SOLUTION RESPIRATORY (INHALATION)
Status: CANCELLED | OUTPATIENT
Start: 2023-09-13

## 2023-09-06 RX ORDER — METHYLPREDNISOLONE SODIUM SUCCINATE 125 MG/2ML
125 INJECTION, POWDER, LYOPHILIZED, FOR SOLUTION INTRAMUSCULAR; INTRAVENOUS
Status: DISCONTINUED | OUTPATIENT
Start: 2023-09-06 | End: 2023-09-06 | Stop reason: HOSPADM

## 2023-09-06 RX ADMIN — SODIUM CHLORIDE 250 ML: 9 INJECTION, SOLUTION INTRAVENOUS at 13:12

## 2023-09-06 RX ADMIN — FERRIC CARBOXYMALTOSE INJECTION 750 MG: 50 INJECTION, SOLUTION INTRAVENOUS at 13:12

## 2023-09-06 NOTE — PROGRESS NOTES
Infusion Nursing Note:  Damion Quinones presents today for Injectafer dose 1 of 2.    Patient seen by provider today: No   present during visit today: Not Applicable.    Note: BP (!) 144/83 (Patient Position: Sitting)   Pulse 101   Temp 98.4  F (36.9  C) (Oral)   Resp 16   SpO2 98% .  Injectafer infused.    Intravenous Access:  Peripheral IV placed via PICC team.    Treatment Conditions:  Not Applicable.      Post Infusion Assessment:  Patient tolerated infusion without incident.  Monitored post infusion for 30 minutes. VSS prior to discharge.  Blood return noted pre and post infusion.  Site patent and intact, free from redness, edema or discomfort.  No evidence of extravasations.  Access discontinued per protocol.       Discharge Plan:   Discharge instructions reviewed with: Patient.  Patient and/or family verbalized understanding of discharge instructions and all questions answered.  Patient discharged in stable condition accompanied by: self.  Departure Mode: Ambulatory w/4WW.      Timmy Delacruz RN

## 2023-09-06 NOTE — PROGRESS NOTES
Anemia Management Note  SUBJECTIVE/OBJECTIVE:  Referred by Dr. Subhash Dutta on 2023  Primary Diagnosis: Anemia of Other Chronic Illness (D63.8)     Secondary Diagnosis:  Organ or tissue replaced by transplant, kidney (Z94.0)  Kidney Tx: 2023  Hgb goal range:  9-10  Epo/Darbo: Aranesp  40 mcg  every two weeks for Hgb <10.0. In clinic  *If Aranesp is ordered dose at 0.45mcg/kg  Iron regimen:  NA  Labs : 2024  Recent FEDERICO use, transfusion, IV iron: Mircera   RX/TX plans : 2024     *[facility]; [phone]      Hx of DVT (2023), Afib, anticoagulate.      Consent to Communicate:  OK to speak with Nabila Quinones (wife) per consent to communicate dated 10/27/2022. No Boxes Checked on Consent to Communicate.         Latest Ref Rng & Units 2023     7:20 AM 2023    10:00 AM 2023     9:15 AM 2023    10:14 AM 2023    12:00 PM 2023     7:02 AM 2023     1:00 PM   Anemia   FEDERICO Dose   40mcg   40mcg     Hemoglobin 13.3 - 17.7 g/dL 7.5   8.2  8.6   8.0     IV Iron Dose        750mg   Ferritin 31 - 409 ng/mL      485       BP Readings from Last 3 Encounters:   23 (!) 144/83   23 134/89   23 (!) 149/88     Wt Readings from Last 2 Encounters:   23 99.8 kg (220 lb)   23 100.7 kg (222 lb)           ASSESSMENT:  Hgb:Not at goal/Known  TSat: Due for iron studies 4 weeks after last IV iron infusion Ferritin: Due for iron studies 4 weeks after last IV iron infusion    PLAN:  2nd Injctafer scheduled for 23.  Aranesp is due approx 23.     Orders needed to be renewed (for next follow-up date) in EPIC: None    Iron labs due:  4 weeks after last IV iron infusion    Plan discussed with:  No call, chart review      NEXT FOLLOW-UP DATE:  23    Molly Fenton RN   Anemia Services  [facility]  [email or web address]   Office : [phone]  Fax: [phone]

## 2023-09-07 ENCOUNTER — TELEPHONE (OUTPATIENT)
Dept: TRANSPLANT | Facility: CLINIC | Age: 68
End: 2023-09-07

## 2023-09-07 ENCOUNTER — THERAPY VISIT (OUTPATIENT)
Dept: PHYSICAL THERAPY | Facility: REHABILITATION | Age: 68
End: 2023-09-07
Payer: COMMERCIAL

## 2023-09-07 DIAGNOSIS — L40.50 PSORIATIC ARTHRITIS (H): ICD-10-CM

## 2023-09-07 DIAGNOSIS — Z94.0 KIDNEY REPLACED BY TRANSPLANT: ICD-10-CM

## 2023-09-07 DIAGNOSIS — Z74.09 IMPAIRED FUNCTIONAL MOBILITY, BALANCE, GAIT, AND ENDURANCE: Primary | ICD-10-CM

## 2023-09-07 DIAGNOSIS — Z94.4 HISTORY OF LIVER TRANSPLANT (H): ICD-10-CM

## 2023-09-07 DIAGNOSIS — Z94.0 KIDNEY TRANSPLANT RECIPIENT: ICD-10-CM

## 2023-09-07 DIAGNOSIS — I77.82 ANCA-ASSOCIATED VASCULITIS (H): Primary | ICD-10-CM

## 2023-09-07 DIAGNOSIS — Z94.0 HISTORY OF KIDNEY TRANSPLANT: ICD-10-CM

## 2023-09-07 DIAGNOSIS — M1A.9XX1 TOPHACEOUS GOUT: ICD-10-CM

## 2023-09-07 DIAGNOSIS — Z94.4 LIVER TRANSPLANT RECIPIENT (H): ICD-10-CM

## 2023-09-07 PROCEDURE — 97110 THERAPEUTIC EXERCISES: CPT | Mod: GP

## 2023-09-07 PROCEDURE — 97112 NEUROMUSCULAR REEDUCATION: CPT | Mod: GP

## 2023-09-07 RX ORDER — PREDNISONE 5 MG/1
10 TABLET ORAL DAILY
Qty: 135 TABLET | Refills: 3 | Status: SHIPPED | OUTPATIENT
Start: 2023-09-07 | End: 2023-11-07

## 2023-09-07 NOTE — TELEPHONE ENCOUNTER
Returned Pilar's call:    Left a voicemail with a callback number. Informed her that her message was received, patient's med list was updated and I would update coordinator Cindy Clay as well as his hepatologist on the prednisone increase and duration.

## 2023-09-07 NOTE — TELEPHONE ENCOUNTER
Patient Call: General  Route to LPN    Reason for call: Pilar from [facility] calling to inform Coordinator, that she changed the dose for patient's prednisone to 10 mg due to a flare up, and will keep like that until his follow up in November.     Call back needed? Yes    Return Call Needed  Same as documented in contacts section  When to return call?: Same day: [facility]

## 2023-09-08 ENCOUNTER — TELEPHONE (OUTPATIENT)
Dept: CARDIOLOGY | Facility: CLINIC | Age: 68
End: 2023-09-08
Payer: COMMERCIAL

## 2023-09-08 NOTE — TELEPHONE ENCOUNTER
Health Call Center    Phone Message    May a detailed message be left on voicemail: yes     Reason for Call: Medication Question or concern regarding medication   Prescription Clarification  Name of Medication: apixaban ANTICOAGULANT (ELIQUIS ANTICOAGULANT) 5 MG tablet   Prescribing Provider: Dr. Marrero   Pharmacy:    What on the order needs clarification?  Myla is requesting a 2day hold prior to colonoscopy on 10/3/23.      Action Taken: Other: Cardiology    Travel Screening: Not Applicable    Thank you!  [facility]

## 2023-09-11 ENCOUNTER — TELEPHONE (OUTPATIENT)
Dept: TRANSPLANT | Facility: CLINIC | Age: 68
End: 2023-09-11
Payer: MEDICARE

## 2023-09-11 NOTE — TELEPHONE ENCOUNTER
Called spoke to     He is going out of town x3 weeks in October, couldn't really convince them otherwise. I asked that we get labs once midway through trip at [place] or [place]. Please help arrange.

## 2023-09-11 NOTE — TELEPHONE ENCOUNTER
Call to Apoorva and Casey to find out if the 9/18 appt with Dr. Clifton was related to his hernia. Apoorva said that Dr. Clifton had told them at his last appt that he wanted to see Casey in 2 weeks because his incision is not quite fully healed and they have been working on that together.   Message sent to schedulers and Shaunna HEAD to get appt rescheduled. Will update Apoorva and Casey when scheduled.

## 2023-09-11 NOTE — TELEPHONE ENCOUNTER
Patient Call: General    Reason for call: Apoorva called regarding the 9/18/23 Appt. With Dr. HOPE which is now canceled. Would like to know why is it canceled and if labs are still needed could they do them locally?    Call back needed? Yes    Return Call Needed  Same as documented in contacts section  When to return call?: Same day: Route [facility]

## 2023-09-13 ENCOUNTER — PATIENT OUTREACH (OUTPATIENT)
Dept: CARE COORDINATION | Facility: CLINIC | Age: 68
End: 2023-09-13

## 2023-09-13 ENCOUNTER — INFUSION THERAPY VISIT (OUTPATIENT)
Dept: INFUSION THERAPY | Facility: CLINIC | Age: 68
End: 2023-09-13
Attending: INTERNAL MEDICINE
Payer: COMMERCIAL

## 2023-09-13 VITALS
DIASTOLIC BLOOD PRESSURE: 88 MMHG | TEMPERATURE: 97.8 F | HEART RATE: 105 BPM | SYSTOLIC BLOOD PRESSURE: 148 MMHG | OXYGEN SATURATION: 96 % | RESPIRATION RATE: 16 BRPM

## 2023-09-13 DIAGNOSIS — D63.1 ANEMIA IN STAGE 2 CHRONIC KIDNEY DISEASE: Primary | ICD-10-CM

## 2023-09-13 DIAGNOSIS — Z94.4 LIVER TRANSPLANT RECIPIENT (H): ICD-10-CM

## 2023-09-13 DIAGNOSIS — Z94.4 LIVER REPLACED BY TRANSPLANT (H): ICD-10-CM

## 2023-09-13 DIAGNOSIS — N18.2 ANEMIA IN STAGE 2 CHRONIC KIDNEY DISEASE: Primary | ICD-10-CM

## 2023-09-13 DIAGNOSIS — Z94.0 KIDNEY REPLACED BY TRANSPLANT: ICD-10-CM

## 2023-09-13 PROCEDURE — 96365 THER/PROPH/DIAG IV INF INIT: CPT

## 2023-09-13 PROCEDURE — 258N000003 HC RX IP 258 OP 636: Performed by: INTERNAL MEDICINE

## 2023-09-13 PROCEDURE — 250N000011 HC RX IP 250 OP 636: Mod: JZ | Performed by: INTERNAL MEDICINE

## 2023-09-13 RX ORDER — HEPARIN SODIUM (PORCINE) LOCK FLUSH IV SOLN 100 UNIT/ML 100 UNIT/ML
5 SOLUTION INTRAVENOUS
Status: CANCELLED | OUTPATIENT
Start: 2023-09-13

## 2023-09-13 RX ORDER — EPINEPHRINE 1 MG/ML
0.3 INJECTION, SOLUTION INTRAMUSCULAR; SUBCUTANEOUS EVERY 5 MIN PRN
Status: CANCELLED | OUTPATIENT
Start: 2023-09-13

## 2023-09-13 RX ORDER — MEPERIDINE HYDROCHLORIDE 50 MG/ML
25 INJECTION INTRAMUSCULAR; INTRAVENOUS; SUBCUTANEOUS EVERY 30 MIN PRN
Status: CANCELLED | OUTPATIENT
Start: 2023-09-13

## 2023-09-13 RX ORDER — MYCOPHENOLIC ACID 180 MG/1
540 TABLET, DELAYED RELEASE ORAL 2 TIMES DAILY
Qty: 540 TABLET | Refills: 3 | Status: SHIPPED | OUTPATIENT
Start: 2023-09-13 | End: 2024-01-03

## 2023-09-13 RX ORDER — DIPHENHYDRAMINE HYDROCHLORIDE 50 MG/ML
50 INJECTION INTRAMUSCULAR; INTRAVENOUS
Status: CANCELLED
Start: 2023-09-13

## 2023-09-13 RX ORDER — MYCOPHENOLIC ACID 180 MG/1
540 TABLET, DELAYED RELEASE ORAL 2 TIMES DAILY
Qty: 180 TABLET | Refills: 3 | Status: SHIPPED | OUTPATIENT
Start: 2023-08-29 | End: 2023-09-13

## 2023-09-13 RX ORDER — ALBUTEROL SULFATE 0.83 MG/ML
2.5 SOLUTION RESPIRATORY (INHALATION)
Status: CANCELLED | OUTPATIENT
Start: 2023-09-13

## 2023-09-13 RX ORDER — ALBUTEROL SULFATE 90 UG/1
1-2 AEROSOL, METERED RESPIRATORY (INHALATION)
Status: CANCELLED
Start: 2023-09-13

## 2023-09-13 RX ORDER — HEPARIN SODIUM,PORCINE 10 UNIT/ML
5-20 VIAL (ML) INTRAVENOUS DAILY PRN
Status: CANCELLED | OUTPATIENT
Start: 2023-09-13

## 2023-09-13 RX ORDER — METHYLPREDNISOLONE SODIUM SUCCINATE 125 MG/2ML
125 INJECTION, POWDER, LYOPHILIZED, FOR SOLUTION INTRAMUSCULAR; INTRAVENOUS
Status: CANCELLED
Start: 2023-09-13

## 2023-09-13 RX ADMIN — SODIUM CHLORIDE 250 ML: 9 INJECTION, SOLUTION INTRAVENOUS at 09:42

## 2023-09-13 RX ADMIN — FERRIC CARBOXYMALTOSE INJECTION 750 MG: 50 INJECTION, SOLUTION INTRAVENOUS at 09:42

## 2023-09-13 NOTE — TELEPHONE ENCOUNTER
Medication/Refill approved per CPA:    [facility] COLLABORATIVE AGREEMENT FOR  IMMUNOSUPPRESSENT PRESCRIPTION MODIFICATION.      Routing encounter to Transplant as an FYI.    Thanks,  Yuliya Young, Pharm D  [facility]  Specialty - [phone]  Transplant - [phone]

## 2023-09-13 NOTE — PROGRESS NOTES
Infusion Nursing Note:  Damion Quinones presents today for injectafer infusion.    Patient seen by provider today: No   present during visit today: Not Applicable.    Note: BP (!) 148/88 (Patient Position: Sitting)   Pulse 105   Temp 97.8  F (36.6  C)   Resp 16   SpO2 96%   .      Intravenous Access:  Peripheral IV placed.    Treatment Conditions:  Not Applicable.      Post Infusion Assessment:  Patient tolerated infusion without incident.       Discharge Plan:   Patient discharged in stable condition accompanied by: self.  Departure Mode: Ambulatory.      Shaunna Vigil RN

## 2023-09-13 NOTE — PROGRESS NOTES
Anemia Management Note  SUBJECTIVE/OBJECTIVE:  Referred by Dr. Subhash Dutta on 2023  Primary Diagnosis: Anemia of Other Chronic Illness (D63.8)     Secondary Diagnosis:  Organ or tissue replaced by transplant, kidney (Z94.0)  Kidney Tx: 2023  Hgb goal range:  9-10  Epo/Darbo: Aranesp  40 mcg  every two weeks for Hgb <10.0. In clinic  *If Aranesp is ordered dose at 0.45mcg/kg  Iron regimen:  NA  Labs : 2024  Recent FEDERICO use, transfusion, IV iron: Mircera   RX/TX plans : 2024     *[facility]; [phone]      Hx of DVT (2023), Afib, anticoagulate.      Consent to Communicate:  OK to speak with Nabila Quinones (wife) per consent to communicate dated 10/27/2022. No Boxes Checked on Consent to Communicate.         Latest Ref Rng & Units 2023    10:00 AM 2023     9:15 AM 2023    10:14 AM 2023    12:00 PM 2023     7:02 AM 2023     1:00 PM 2023    12:00 PM   Anemia   FEDERICO Dose  40mcg   40mcg      Hemoglobin 13.3 - 17.7 g/dL  8.2  8.6   8.0      IV Iron Dose       750mg 750mg   Ferritin 31 - 409 ng/mL     485        BP Readings from Last 3 Encounters:   23 (!) 148/88   23 (!) 144/83   23 134/89     Wt Readings from Last 2 Encounters:   23 99.8 kg (220 lb)   23 100.7 kg (222 lb)           ASSESSMENT:  Hgb:Not at goal/Known  TSat: Due for iron studies 4 weeks after last IV iron infusion Ferritin: Due for iron studies 4 weeks after last IV iron infusion    PLAN:  Aranesp is due in the next week.     Orders needed to be renewed (for next follow-up date) in EPIC: None    Iron labs due:  4 weeks after last IV iron infusion    Plan discussed with:  No call, chart review       NEXT FOLLOW-UP DATE:  23.     Molly Fenton RN   Anemia Services  [facility]  [email or web address]   Office : [phone]  Fax: [phone]

## 2023-09-14 DIAGNOSIS — Z94.4 LIVER TRANSPLANT RECIPIENT (H): ICD-10-CM

## 2023-09-14 RX ORDER — ATORVASTATIN CALCIUM 20 MG/1
20 TABLET, FILM COATED ORAL EVERY EVENING
Qty: 30 TABLET | Refills: 0 | Status: SHIPPED | OUTPATIENT
Start: 2023-09-14 | End: 2023-10-05

## 2023-09-14 RX ORDER — PANTOPRAZOLE SODIUM 40 MG/1
40 TABLET, DELAYED RELEASE ORAL
Qty: 30 TABLET | Refills: 0 | Status: SHIPPED | OUTPATIENT
Start: 2023-09-14 | End: 2023-10-05

## 2023-09-15 DIAGNOSIS — I48.21 PERMANENT ATRIAL FIBRILLATION WITH RAPID VENTRICULAR RESPONSE (H): ICD-10-CM

## 2023-09-15 RX ORDER — METOPROLOL TARTRATE 25 MG/1
25 TABLET, FILM COATED ORAL 2 TIMES DAILY
Qty: 180 TABLET | Refills: 3
Start: 2023-09-15 | End: 2023-10-10

## 2023-09-18 ENCOUNTER — OFFICE VISIT (OUTPATIENT)
Dept: TRANSPLANT | Facility: CLINIC | Age: 68
End: 2023-09-18
Attending: TRANSPLANT SURGERY
Payer: COMMERCIAL

## 2023-09-18 ENCOUNTER — TELEPHONE (OUTPATIENT)
Dept: TRANSPLANT | Facility: CLINIC | Age: 68
End: 2023-09-18

## 2023-09-18 ENCOUNTER — LAB (OUTPATIENT)
Dept: LAB | Facility: CLINIC | Age: 68
End: 2023-09-18
Payer: COMMERCIAL

## 2023-09-18 ENCOUNTER — PATIENT OUTREACH (OUTPATIENT)
Dept: CARE COORDINATION | Facility: CLINIC | Age: 68
End: 2023-09-18

## 2023-09-18 VITALS
BODY MASS INDEX: 34.5 KG/M2 | DIASTOLIC BLOOD PRESSURE: 70 MMHG | HEART RATE: 89 BPM | SYSTOLIC BLOOD PRESSURE: 107 MMHG | OXYGEN SATURATION: 98 % | WEIGHT: 220.3 LBS

## 2023-09-18 DIAGNOSIS — Z79.899 ENCOUNTER FOR LONG-TERM CURRENT USE OF MEDICATION: ICD-10-CM

## 2023-09-18 DIAGNOSIS — Z94.0 KIDNEY REPLACED BY TRANSPLANT: ICD-10-CM

## 2023-09-18 DIAGNOSIS — E83.39 HYPOPHOSPHATEMIA: Primary | ICD-10-CM

## 2023-09-18 DIAGNOSIS — Z94.4 LIVER TRANSPLANT RECIPIENT (H): ICD-10-CM

## 2023-09-18 DIAGNOSIS — N18.2 ANEMIA IN STAGE 2 CHRONIC KIDNEY DISEASE: ICD-10-CM

## 2023-09-18 DIAGNOSIS — Z94.4 LIVER REPLACED BY TRANSPLANT (H): ICD-10-CM

## 2023-09-18 DIAGNOSIS — E83.39 HYPOPHOSPHATEMIA: ICD-10-CM

## 2023-09-18 DIAGNOSIS — D84.9 IMMUNOSUPPRESSED STATUS (H): Primary | ICD-10-CM

## 2023-09-18 DIAGNOSIS — Z48.298 AFTERCARE FOLLOWING ORGAN TRANSPLANT: ICD-10-CM

## 2023-09-18 DIAGNOSIS — D63.1 ANEMIA IN STAGE 2 CHRONIC KIDNEY DISEASE: ICD-10-CM

## 2023-09-18 LAB
ALBUMIN SERPL BCG-MCNC: 3.8 G/DL (ref 3.5–5.2)
ALP SERPL-CCNC: 163 U/L (ref 40–129)
ALT SERPL W P-5'-P-CCNC: 20 U/L (ref 0–70)
ANION GAP SERPL CALCULATED.3IONS-SCNC: 10 MMOL/L (ref 7–15)
AST SERPL W P-5'-P-CCNC: 20 U/L (ref 0–45)
BASOPHILS # BLD MANUAL: 0.1 10E3/UL (ref 0–0.2)
BASOPHILS NFR BLD MANUAL: 2 %
BILIRUB DIRECT SERPL-MCNC: <0.2 MG/DL (ref 0–0.3)
BILIRUB SERPL-MCNC: 0.4 MG/DL
BUN SERPL-MCNC: 19.3 MG/DL (ref 8–23)
CALCIUM SERPL-MCNC: 9.2 MG/DL (ref 8.8–10.2)
CHLORIDE SERPL-SCNC: 103 MMOL/L (ref 98–107)
CREAT SERPL-MCNC: 1.12 MG/DL (ref 0.67–1.17)
DACRYOCYTES BLD QL SMEAR: SLIGHT
DEPRECATED HCO3 PLAS-SCNC: 26 MMOL/L (ref 22–29)
EGFRCR SERPLBLD CKD-EPI 2021: 72 ML/MIN/1.73M2
EOSINOPHIL # BLD MANUAL: 0.3 10E3/UL (ref 0–0.7)
EOSINOPHIL NFR BLD MANUAL: 5 %
ERYTHROCYTE [DISTWIDTH] IN BLOOD BY AUTOMATED COUNT: 22.6 % (ref 10–15)
GLUCOSE SERPL-MCNC: 102 MG/DL (ref 70–99)
HCT VFR BLD AUTO: 35.8 % (ref 40–53)
HGB BLD-MCNC: 10.3 G/DL (ref 13.3–17.7)
LYMPHOCYTES # BLD MANUAL: 0.3 10E3/UL (ref 0.8–5.3)
LYMPHOCYTES NFR BLD MANUAL: 5 %
MAGNESIUM SERPL-MCNC: 1.6 MG/DL (ref 1.7–2.3)
MCH RBC QN AUTO: 27.3 PG (ref 26.5–33)
MCHC RBC AUTO-ENTMCNC: 28.8 G/DL (ref 31.5–36.5)
MCV RBC AUTO: 95 FL (ref 78–100)
METAMYELOCYTES # BLD MANUAL: 0.5 10E3/UL
METAMYELOCYTES NFR BLD MANUAL: 8 %
MONOCYTES # BLD MANUAL: 0.8 10E3/UL (ref 0–1.3)
MONOCYTES NFR BLD MANUAL: 13 %
MYELOCYTES # BLD MANUAL: 0.2 10E3/UL
MYELOCYTES NFR BLD MANUAL: 3 %
NEUTROPHILS # BLD MANUAL: 3.7 10E3/UL (ref 1.6–8.3)
NEUTROPHILS NFR BLD MANUAL: 64 %
PHOSPHATE SERPL-MCNC: 1.3 MG/DL (ref 2.5–4.5)
PLAT MORPH BLD: ABNORMAL
PLATELET # BLD AUTO: 218 10E3/UL (ref 150–450)
POTASSIUM SERPL-SCNC: 4.2 MMOL/L (ref 3.4–5.3)
PROT SERPL-MCNC: 6 G/DL (ref 6.4–8.3)
RBC # BLD AUTO: 3.77 10E6/UL (ref 4.4–5.9)
RBC MORPH BLD: ABNORMAL
SODIUM SERPL-SCNC: 139 MMOL/L (ref 136–145)
TACROLIMUS BLD-MCNC: 8.1 UG/L (ref 5–15)
TME LAST DOSE: NORMAL H
TME LAST DOSE: NORMAL H
WBC # BLD AUTO: 5.8 10E3/UL (ref 4–11)
WBC # BLD AUTO: 5.8 10E3/UL (ref 4–11)

## 2023-09-18 PROCEDURE — 80197 ASSAY OF TACROLIMUS: CPT | Performed by: INTERNAL MEDICINE

## 2023-09-18 PROCEDURE — 85007 BL SMEAR W/DIFF WBC COUNT: CPT | Performed by: PATHOLOGY

## 2023-09-18 PROCEDURE — 83735 ASSAY OF MAGNESIUM: CPT | Performed by: PATHOLOGY

## 2023-09-18 PROCEDURE — 36415 COLL VENOUS BLD VENIPUNCTURE: CPT | Performed by: PATHOLOGY

## 2023-09-18 PROCEDURE — 82248 BILIRUBIN DIRECT: CPT | Performed by: PATHOLOGY

## 2023-09-18 PROCEDURE — 80180 DRUG SCRN QUAN MYCOPHENOLATE: CPT | Performed by: INTERNAL MEDICINE

## 2023-09-18 PROCEDURE — 82306 VITAMIN D 25 HYDROXY: CPT | Performed by: INTERNAL MEDICINE

## 2023-09-18 PROCEDURE — 99000 SPECIMEN HANDLING OFFICE-LAB: CPT | Performed by: PATHOLOGY

## 2023-09-18 PROCEDURE — 84100 ASSAY OF PHOSPHORUS: CPT | Performed by: PATHOLOGY

## 2023-09-18 PROCEDURE — G0463 HOSPITAL OUTPT CLINIC VISIT: HCPCS | Performed by: TRANSPLANT SURGERY

## 2023-09-18 PROCEDURE — 99213 OFFICE O/P EST LOW 20 MIN: CPT | Performed by: TRANSPLANT SURGERY

## 2023-09-18 PROCEDURE — 80053 COMPREHEN METABOLIC PANEL: CPT | Performed by: PATHOLOGY

## 2023-09-18 PROCEDURE — 85027 COMPLETE CBC AUTOMATED: CPT | Performed by: PATHOLOGY

## 2023-09-18 NOTE — PROGRESS NOTES
Anemia Management Note  SUBJECTIVE/OBJECTIVE:  Referred by Dr. Subhash Dutta on 2023  Primary Diagnosis: Anemia of Other Chronic Illness (D63.8)     Secondary Diagnosis:  Organ or tissue replaced by transplant, kidney (Z94.0)  Kidney Tx: 2023  Hgb goal range:  9-10  Epo/Darbo: Aranesp  40 mcg  every two weeks for Hgb <10.0. In clinic  *If Aranesp is ordered dose at 0.45mcg/kg  Iron regimen:  NA  Labs : 2024  Recent FEDERICO use, transfusion, IV iron: Mircera   RX/TX plans : 2024     *[facility]; [phone]      Hx of DVT (2023), Afib, anticoagulate.      Consent to Communicate:  OK to speak with Nabila Quinones (wife) per consent to communicate dated 10/27/2022. No Boxes Checked on Consent to Communicate.         Latest Ref Rng & Units 2023     9:15 AM 2023    10:14 AM 2023    12:00 PM 2023     7:02 AM 2023     1:00 PM 2023    12:00 PM 2023     8:06 AM   Anemia   FEDERICO Dose    40mcg       Hemoglobin 13.3 - 17.7 g/dL 8.2  8.6   8.0    10.3    IV Iron Dose      750mg 750mg    Ferritin 31 - 409 ng/mL    485         BP Readings from Last 3 Encounters:   23 107/70   23 (!) 148/88   23 (!) 144/83     Wt Readings from Last 2 Encounters:   23 99.9 kg (220 lb 4.8 oz)   23 99.8 kg (220 lb)           ASSESSMENT:  Hgb:Above goal - recommend hold dose  TSat: not at goal of >30% Ferritin: At goal (>100ng/mL)    PLAN:  Hold Aranesp and RTC for hgb then aranesp if needed in 2 week(s). Labs are scheduled for 10/10/23.     Orders needed to be renewed (for next follow-up date) in EPIC: None    Iron labs due:  Early Oct 2023    Plan discussed with:  No call, chart review       NEXT FOLLOW-UP DATE:  10/10/23    Molly Fenton RN   Anemia Services  [facility]  [email or web address]   Office : [phone]  Fax: [phone]

## 2023-09-18 NOTE — LETTER
9/18/2023         RE: Damion Quinones   [address] [facility] [address]        Dear Colleague,    Thank you for referring your patient, Damion Quinones, to the [facility]. Please see a copy of my visit note below.    Transplant Surgery -OUTPATIENT IMMUNOSUPPRESSION PROGRESS NOTE    Date of Visit: 09/18/2023    Transplants:  6/6/2023 (Liver), 6/6/2023 (Kidney); Postoperative day:  104 (Liver), 104 (Kidney)  ASSESMENT AND PLAN:  1.Graft Function:Liver allograft: no rejection or technical problems.  Kidney allograft: no rejection or technical problems;     2.Immunosuppression Management:keep tacrolimus levels 8-10 ng/dL  3.Hypertension:ok  4.Renal Function:excellent  5.Lab frequency:bi weekl  6.Other:    Umbilical hernia: will repair once the prednisone is 5 mg or lower    Date: September 18, 2023    Transplant:  [x]                             Liver [x]                              Kidney []                             Pancreas []                              Other:             Chief Complaint:  Doing well has a painful umbilical hernia    History of Present Illness:  Patient Active Problem List   Diagnosis     Psoriatic arthritis (H)     PAF (paroxysmal atrial fibrillation) (H)     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)     HTN, kidney transplant related     Hereditary hemochromatosis (H)     Other hyperlipidemia     Tophaceous gout     Deep vein thrombosis (DVT) of non-extremity vein, unspecified chronicity     CKD (chronic kidney disease), stage II     Chronic atrial fibrillation (H)     Liver replaced by transplant (H)     Immunosuppressed status (H)     Kidney replaced by transplant     CAD (coronary artery disease)     Steroid-induced hyperglycemia     Muscular deconditioning     Aftercare following organ transplant     Anemia in stage 2 chronic kidney disease     HCC (hepatocellular carcinoma) (H)     SOCIAL /FAMILY HISTORY:  [x]                  No recent change    Past Medical History:   Diagnosis Date     Alcoholic cirrhosis of liver with ascites (H) 10/11/2019     ANCA-associated vasculitis (H) 2022     C. difficile colitis      Coronary artery disease     [facility] 4/2023 - complete occlusion of RCA     ESRD (end stage renal disease) on dialysis (H)      Gout      HCC (hepatocellular carcinoma) (H) 9/5/2023     History of hemochromatosis 10/11/2019     Hypertension      IgA nephropathy      Obesity      PAF (paroxysmal atrial fibrillation) (H)      Pre-diabetes 2023     Psoriatic arthritis (H)      RA (rheumatoid arthritis) (H)      Status post kidney transplant 06/06/2023    Induction with thymoglobulin 4mg/kg, + DSA CW9     Status post liver transplantation (H) 06/06/2023     Past Surgical History:   Procedure Laterality Date     APPENDECTOMY      Removed at 18 Years Old      BENCH KIDNEY  06/06/2023    Procedure: Bench kidney;  Surgeon: Chad Clifton MD;  Location:  OR     [facility] LIVER  06/06/2023    Procedure: Bench liver;  Surgeon: Chad Clifton MD;  Location:  OR     COLONOSCOPY      2014 at [facility]      CV CORONARY ANGIOGRAM N/A 04/27/2023    Procedure: Coronary Angiogram;  Surgeon: Pablo Araujo MD;  Location:  HEART CARDIAC CATH LAB     EYE SURGERY Bilateral     Cataract     H STATISTIC PICC LINE INSERTION >5YR, FAILED Left 06/16/2023    Unable to advance catheter over the axillary area     HERNIA REPAIR      History of bilateral inguinal hernia repair: 10/28/2014. Open hernia repair: 10/2017. Abdominal wound exploration and debridement 12/27/2017     INSERT SHUNT PORTAL TRANSJUGULAR INTRAHEPTIC  09/26/2022     IR CVC NON TUNNEL LINE REMOVAL  7/3/2023     IR CVC NON TUNNEL PLACEMENT > 5 YRS  6/14/2023     IR CVC TUNNEL PLACEMENT > 5 YRS OF AGE  01/26/2023     IR PICC PLACEMENT > 5 YRS OF AGE  6/16/2023     IR RENAL BIOPSY RIGHT  6/20/2023     RETURN LIVER TRANSPLANT N/A 06/06/2023     Procedure: Return liver transplant. Intra-operative ultrasound;  Surgeon: Chad Clifton MD;  Location: [facility] OR     TRANSPLANT KIDNEY RECIPIENT  DONOR N/A 2023    Procedure: Transplant kidney recipient  donor, ureteral stent placement;  Surgeon: Chad Clifton MD;  Location: [facility] OR     TRANSPLANT LIVER RECIPIENT  DONOR N/A 2023    Procedure: Transplant liver recipient  donor;  Surgeon: Chad Clifton MD;  Location: [facility] OR     Social History     Socioeconomic History     Marital status:      Spouse name: Not on file     Number of children: Not on file     Years of education: Not on file     Highest education level: Not on file   Occupational History     Not on file   Tobacco Use     Smoking status: Never     Passive exposure: Never     Smokeless tobacco: Never   Vaping Use     Vaping Use: Never used   Substance and Sexual Activity     Alcohol use: Not Currently     Comment: Last ETOH use was 2021     Drug use: Not Currently     Sexual activity: Not on file   Other Topics Concern     Parent/sibling w/ CABG, MI or angioplasty before 65F 55M? Not Asked   Social History Narrative     Not on file     Social Determinants of Health     Financial Resource Strain: Not on file   Food Insecurity: Not on file   Transportation Needs: Not on file   Physical Activity: Not on file   Stress: Not on file   Social Connections: Not on file   Intimate Partner Violence: Not on file   Housing Stability: Not on file     Prescription Medications as of 2023         Rx Number Disp Refills Start End Last Dispensed Date Next Fill Date Owning Pharmacy    Alcohol Swabs PADS  100 each 0 2023    [place], [place] - [address]    Sig: Use to swab the area of the injection or quyen as directed Per insurance coverage    Class: E-Prescribe    allopurinol (ZYLOPRIM) 100 MG tablet  180 tablet 3 2023        Sig: Take 300 mg by mouth  daily    Class: Historical    Route: Oral    apixaban ANTICOAGULANT (ELIQUIS ANTICOAGULANT) 5 MG tablet  180 tablet 3 8/22/2023    [id number] [address]  AT [facility]    Sig: Take 1 tablet (5 mg) by mouth 2 times daily    Class: E-Prescribe    Route: Oral    atorvastatin (LIPITOR) 20 MG tablet  30 tablet 0 9/14/2023    [facility] [place], [place] - [address]    Sig: Take 1 tablet (20 mg) by mouth every evening    Class: E-Prescribe    Route: Oral    blood glucose (NO BRAND SPECIFIED) lancets standard  100 each 0 7/25/2023    [facility] [address]    Sig: To use to test glucose level in the blood Use to test blood sugar  4  times daily as directed. To accompany glucose monitor brands per insurance coverage.    Class: E-Prescribe    blood glucose (NO BRAND SPECIFIED) test strip  30 strip 3 8/7/2023    [place], [place] - [address] [place] [address] [address]    Sig: Use to test blood sugar 3 times daily or as directed.    Class: E-Prescribe    blood glucose (NO BRAND SPECIFIED) test strip  4 strip 1 8/4/2023    [id number] [address]  AT [facility]    Sig: To use to test glucose level in the blood Use to test blood sugar  4 times daily as directed. To accompany glucose monitor brands per insurance coverage.    Class: E-Prescribe    blood glucose monitoring (NO BRAND SPECIFIED) meter device kit  1 kit 0 7/25/2023    [facility] [address]    Sig: Use as directed Per insurance coverage    Class: E-Prescribe    levothyroxine (SYNTHROID/LEVOTHROID) 88 MCG tablet  30 tablet 3 9/5/2023    [facility] #[id number] [facility] [address]  AT [facility]    Sig: Take 1 tablet (88 mcg) by mouth daily    Class: E-Prescribe    Route: Oral    magnesium oxide (MAG-OX) 400 MG  tablet  60 tablet 0 8/16/2023    [facility] [place] [address] [address]    Sig: Take 2 tablets (800 mg) by mouth 2 times daily    Class: E-Prescribe    Route: Oral    metoprolol tartrate (LOPRESSOR) 25 MG tablet  180 tablet 3 9/15/2023        Sig: Take 1 tablet (25 mg) by mouth 2 times daily    Class: No Print Out    Route: Oral    multivitamin w/minerals (THERA-VIT-M) tablet  30 tablet 0 8/16/2023    [facility] [address]    Sig: Take 1 tablet by mouth daily    Class: E-Prescribe    Route: Oral    MYFORTIC (BRAND) 180 MG EC tablet  540 tablet 3 9/13/2023    [facility] [address] [address]    Sig: Take 3 tablets (540 mg) by mouth 2 times daily    Class: E-Prescribe    Notes to Pharmacy: TXP DT 6/6/2023 (Liver), 6/6/2023 (Kidney) TXP Dischg DT 6/25/2023 DX Kidney replaced by transplant Z94.0 TX [facility] ([place], [place])    Route: Oral    pantoprazole (PROTONIX) 40 MG EC tablet  30 tablet 0 9/14/2023    [facility] [address] [address]    Sig: Take 1 tablet (40 mg) by mouth every morning (before breakfast)    Class: E-Prescribe    Route: Oral    predniSONE (DELTASONE) 5 MG tablet  135 tablet 3 9/7/2023    [facility] #[id number] - [place], [place] - [address] AT [address]    Sig: Take 2 tablets (10 mg) by mouth daily    Class: E-Prescribe    Route: Oral    Sharps Container MISC  1 each 0 7/25/2023    [facility] [place], [place] - [address]    Sig: Use as directed to dispose of needles, lancets and other sharps    Class: E-Prescribe    sulfamethoxazole-trimethoprim (BACTRIM) 400-80 MG tablet  90 tablet 3 8/17/2023    [facility] [address] [address]    Sig: Take 1 tablet by mouth daily    Class: E-Prescribe    Route: Oral    tacrolimus  (GENERIC EQUIVALENT) 1 MG capsule  540 capsule 3 8/30/2023    [facility] - [place], [place] - [address]    Sig: Take 3 capsules (3 mg) by mouth 2 times daily    Class: E-Prescribe    Notes to Pharmacy: TXP DT 6/6/2023 (Liver), 6/6/2023 (Kidney) TXP Dischg DT 6/25/2023 DX Kidney replaced by transplant Z94.0 TX [facility] ([place] [place])    Route: Oral    ursodiol (ACTIGALL) 250 MG tablet  180 tablet 3 8/17/2023    [facility] - [place], [place] - [address]    Sig: Take 1 tablet (250 mg) by mouth 2 times daily    Class: E-Prescribe    Route: Oral    valGANciclovir (VALCYTE) 450 MG tablet  180 tablet 0 8/17/2023    [facility] - [place], [place] - [address]    Sig: Take 1 tablet (450 mg) by mouth daily    Class: E-Prescribe    Notes to Pharmacy: Profile Rx: patient will contact pharmacy when needed    Route: Oral          Patient has no known allergies.   REVIEW OF SYSTEMS (check box if normal)  [x]               GENERAL  [x]                 PULMONARY [x]                GENITOURINARY  [x]                CNS                 [x]                 CARDIAC  [x]                 ENDOCRINE  [x]                EARS,NOSE,THROAT [x]                 GASTROINTESTINAL [x]                 NEUROLOGIC    [x]                MUSCLOSKELTAL  [x]                  HEMATOLOGY      PHYSICAL EXAM (check box if normal)/70   Pulse 89   Wt 99.9 kg (220 lb 4.8 oz)   SpO2 98%   BMI 34.50 kg/m          [x]            GENERAL:    [x]       EYES:  ICTERIC   []        YES  []                    NO  [x]           EXTREMITIES: Clubbing []       Y     [x]           N    [x]           EARS, NOSE, THROAT: Membranes Moist    YES   [x]                   NO []                  [x]           LUNGS:  CLEAR    YES       [x]                  NO    []                                [x]           SKIN: Jaundice           YES        []                  NO    [x]                   Rash: YES       []                  NO    [x]                                     [x]             HEART: Regular Rate          YES       [x]                  NO    []                   Incision Clean:  YES       [x]                  NO    []                                [x]                    ABDOMEN: Organomegaly YES       []                  NO    [x]                       [x]                    NEUROLOGICAL:  Nonfocal  YES       [x]                  NO    []                       [x]                    Hernia YES       []                  NO    [x]                   PSYCHIATRIC:  Appropriate  YES       [x]                  NO    []                       OTHER:                                                                                                   PAIN SCALE:: 3       Again, thank you for allowing me to participate in the care of your patient.        Sincerely,        Chad Clifton MD

## 2023-09-18 NOTE — LETTER
Damion Quinones   [address] [facility] [address]                September 18, 2023    To whom it may concern,    Casey Quinones is medically able to receive a massage. He should not have a deep tissue massage as this can lead to bruising while on a blood thinner. Light to medium massage is ok though.     Thank you,      Chad Clifton MD, JUSTIN  Professor of Surgery  [employer]  Surgical Director of Liver Transplant Program  Executive Medical Director of Pediatric Transplantation

## 2023-09-18 NOTE — PROGRESS NOTES
Transplant Surgery -OUTPATIENT IMMUNOSUPPRESSION PROGRESS NOTE    Date of Visit: 09/18/2023    Transplants:  6/6/2023 (Liver), 6/6/2023 (Kidney); Postoperative day:  104 (Liver), 104 (Kidney)  ASSESMENT AND PLAN:  1.Graft Function:Liver allograft: no rejection or technical problems.  Kidney allograft: no rejection or technical problems;     2.Immunosuppression Management:keep tacrolimus levels 8-10 ng/dL  3.Hypertension:ok  4.Renal Function:excellent  5.Lab frequency:bi weekl  6.Other:    Umbilical hernia: will repair once the prednisone is 5 mg or lower    Date: September 18, 2023    Transplant:  [x]                             Liver [x]                              Kidney []                             Pancreas []                              Other:             Chief Complaint:  Doing well has a painful umbilical hernia    History of Present Illness:  Patient Active Problem List   Diagnosis    Psoriatic arthritis (H)    PAF (paroxysmal atrial fibrillation) (H)    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)    HTN, kidney transplant related    Hereditary hemochromatosis (H)    Other hyperlipidemia    Tophaceous gout    Deep vein thrombosis (DVT) of non-extremity vein, unspecified chronicity    CKD (chronic kidney disease), stage II    Chronic atrial fibrillation (H)    Liver replaced by transplant (H)    Immunosuppressed status (H)    Kidney replaced by transplant    CAD (coronary artery disease)    Steroid-induced hyperglycemia    Muscular deconditioning    Aftercare following organ transplant    Anemia in stage 2 chronic kidney disease    HCC (hepatocellular carcinoma) (H)     SOCIAL /FAMILY HISTORY: [x]                  No recent change    Past Medical History:   Diagnosis Date    Alcoholic cirrhosis of liver with ascites (H) 10/11/2019    ANCA-associated vasculitis (H) 2022    C. difficile colitis     Coronary artery disease     [facility] 4/2023 - complete occlusion of  RCA    ESRD (end stage renal disease) on dialysis (H)     Gout     HCC (hepatocellular carcinoma) (H) 2023    History of hemochromatosis 10/11/2019    Hypertension     IgA nephropathy     Obesity     PAF (paroxysmal atrial fibrillation) (H)     Pre-diabetes     Psoriatic arthritis (H)     RA (rheumatoid arthritis) (H)     Status post kidney transplant 2023    Induction with thymoglobulin 4mg/kg, + DSA CW9    Status post liver transplantation (H) 2023     Past Surgical History:   Procedure Laterality Date    APPENDECTOMY      Removed at 18 Years Old     BENCH KIDNEY  2023    Procedure: Bench kidney;  Surgeon: Chad Clifton MD;  Location:  OR    BENCH LIVER  2023    Procedure: Bench liver;  Surgeon: Chad Clifton MD;  Location:  OR    COLONOSCOPY      2014 at [facility]     CV CORONARY ANGIOGRAM N/A 2023    Procedure: Coronary Angiogram;  Surgeon: Pablo Araujo MD;  Location:  HEART CARDIAC CATH LAB    EYE SURGERY Bilateral     Cataract    H STATISTIC PICC LINE INSERTION >5YR, FAILED Left 2023    Unable to advance catheter over the axillary area    HERNIA REPAIR      History of bilateral inguinal hernia repair: 10/28/2014. Open hernia repair: 10/2017. Abdominal wound exploration and debridement 2017    INSERT SHUNT PORTAL TRANSJUGULAR INTRAHEPTIC  2022    IR CVC NON TUNNEL LINE REMOVAL  7/3/2023    IR CVC NON TUNNEL PLACEMENT > 5 YRS  2023    IR CVC TUNNEL PLACEMENT > 5 YRS OF AGE  2023    IR PICC PLACEMENT > 5 YRS OF AGE  2023    IR RENAL BIOPSY RIGHT  2023    RETURN LIVER TRANSPLANT N/A 2023    Procedure: Return liver transplant. Intra-operative ultrasound;  Surgeon: Chad Clifton MD;  Location: [facility] OR    TRANSPLANT KIDNEY RECIPIENT  DONOR N/A 2023    Procedure: Transplant kidney recipient  donor, ureteral stent placement;  Surgeon: Chad Clifton MD;   Location: [facility] OR    TRANSPLANT LIVER RECIPIENT  DONOR N/A 2023    Procedure: Transplant liver recipient  donor;  Surgeon: Chad Clifton MD;  Location[facility] OR     Social History     Socioeconomic History    Marital status:      Spouse name: Not on file    Number of children: Not on file    Years of education: Not on file    Highest education level: Not on file   Occupational History    Not on file   Tobacco Use    Smoking status: Never     Passive exposure: Never    Smokeless tobacco: Never   Vaping Use    Vaping Use: Never used   Substance and Sexual Activity    Alcohol use: Not Currently     Comment: Last ETOH use was 2021    Drug use: Not Currently    Sexual activity: Not on file   Other Topics Concern    Parent/sibling w/ CABG, MI or angioplasty before 65F 55M? Not Asked   Social History Narrative    Not on file     Social Determinants of Health     Financial Resource Strain: Not on file   Food Insecurity: Not on file   Transportation Needs: Not on file   Physical Activity: Not on file   Stress: Not on file   Social Connections: Not on file   Intimate Partner Violence: Not on file   Housing Stability: Not on file     Prescription Medications as of 2023         Rx Number Disp Refills Start End Last Dispensed Date Next Fill Date Owning Pharmacy    Alcohol Swabs PADS  100 each 0 2023    [facility] [place], [place] - [address]    Sig: Use to swab the area of the injection or quyen as directed Per insurance coverage    Class: E-Prescribe    allopurinol (ZYLOPRIM) 100 MG tablet  180 tablet 3 2023        Sig: Take 300 mg by mouth daily    Class: Historical    Route: Oral    apixaban ANTICOAGULANT (ELIQUIS ANTICOAGULANT) 5 MG tablet  180 tablet 3 2023    [id number] - [place] [address] AT [facility] [address]    Sig: Take 1 tablet (5 mg) by mouth 2 times daily    Class: E-Prescribe    Route: Oral     atorvastatin (LIPITOR) 20 MG tablet  30 tablet 0 9/14/2023    [facility] [place] [address]    Sig: Take 1 tablet (20 mg) by mouth every evening    Class: E-Prescribe    Route: Oral    blood glucose (NO BRAND SPECIFIED) lancets standard  100 each 0 7/25/2023    [facility] [address]    Sig: To use to test glucose level in the blood Use to test blood sugar  4  times daily as directed. To accompany glucose monitor brands per insurance coverage.    Class: E-Prescribe    blood glucose (NO BRAND SPECIFIED) test strip  30 strip 3 8/7/2023    [facility] [address] [place] [address] [address]    Sig: Use to test blood sugar 3 times daily or as directed.    Class: E-Prescribe    blood glucose (NO BRAND SPECIFIED) test strip  4 strip 1 8/4/2023    [id number] [place] [id number] [facility] AT [facility]    Sig: To use to test glucose level in the blood Use to test blood sugar  4 times daily as directed. To accompany glucose monitor brands per insurance coverage.    Class: E-Prescribe    blood glucose monitoring (NO BRAND SPECIFIED) meter device kit  1 kit 0 7/25/2023    [facility] [address]    Sig: Use as directed Per insurance coverage    Class: E-Prescribe    levothyroxine (SYNTHROID/LEVOTHROID) 88 MCG tablet  30 tablet 3 9/5/2023    [id number] [place] [id number]  AT [facility]    Sig: Take 1 tablet (88 mcg) by mouth daily    Class: E-Prescribe    Route: Oral    magnesium oxide (MAG-OX) 400 MG tablet  60 tablet 0 8/16/2023    [facility]/[place] [address]    Sig: Take 2 tablets (800 mg) by mouth 2 times daily    Class: E-Prescribe    Route: Oral    metoprolol tartrate (LOPRESSOR) 25 MG tablet  180 tablet 3 9/15/2023        Sig: Take 1 tablet (25 mg) by mouth 2  times daily    Class: No Print Out    Route: Oral    multivitamin w/minerals (THERA-VIT-M) tablet  30 tablet 0 8/16/2023    [facility] [place] [address] [address]    Sig: Take 1 tablet by mouth daily    Class: E-Prescribe    Route: Oral    MYFORTIC (BRAND) 180 MG EC tablet  540 tablet 3 9/13/2023    [facility]/[place] [address] [address]    Sig: Take 3 tablets (540 mg) by mouth 2 times daily    Class: E-Prescribe    Notes to Pharmacy: TXP DT 6/6/2023 (Liver), 6/6/2023 (Kidney) TXP Dischg DT 6/25/2023 DX Kidney replaced by transplant Z94.0 [facility] ([place], [place])    Route: Oral    pantoprazole (PROTONIX) 40 MG EC tablet  30 tablet 0 9/14/2023    [facility]/[place] [address] [address]    Sig: Take 1 tablet (40 mg) by mouth every morning (before breakfast)    Class: E-Prescribe    Route: Oral    predniSONE (DELTASONE) 5 MG tablet  135 tablet 3 9/7/2023    [id number] - [place], [place] - [address] AT [facility] OF [address]    Sig: Take 2 tablets (10 mg) by mouth daily    Class: E-Prescribe    Route: Oral    Sharps Container MISC  1 each 0 7/25/2023    [facility] [place], [place] - [address]    Sig: Use as directed to dispose of needles, lancets and other sharps    Class: E-Prescribe    sulfamethoxazole-trimethoprim (BACTRIM) 400-80 MG tablet  90 tablet 3 8/17/2023    [facility]/[place] [address] [address]    Sig: Take 1 tablet by mouth daily    Class: E-Prescribe    Route: Oral    tacrolimus (GENERIC EQUIVALENT) 1 MG capsule  540 capsule 3 8/30/2023    [facility]/[place] [address] [address]    Sig: Take 3 capsules (3 mg) by mouth 2 times daily    Class: E-Prescribe    Notes to Pharmacy: TXP DT 6/6/2023 (Liver), 6/6/2023 (Kidney) TXP Dischg DT 6/25/2023 DX Kidney replaced  by transplant Z94.0 TX [facility] ([place], [place])    Route: Oral    ursodiol (ACTIGALL) 250 MG tablet  180 tablet 3 8/17/2023    [facility] - [place], [place] - [address]    Sig: Take 1 tablet (250 mg) by mouth 2 times daily    Class: E-Prescribe    Route: Oral    valGANciclovir (VALCYTE) 450 MG tablet  180 tablet 0 8/17/2023    [facility] - [place], [place] - [address]    Sig: Take 1 tablet (450 mg) by mouth daily    Class: E-Prescribe    Notes to Pharmacy: Profile Rx: patient will contact pharmacy when needed    Route: Oral          Patient has no known allergies.   REVIEW OF SYSTEMS (check box if normal)  [x]               GENERAL  [x]                 PULMONARY [x]                GENITOURINARY  [x]                CNS                 [x]                 CARDIAC  [x]                 ENDOCRINE  [x]                EARS,NOSE,THROAT [x]                 GASTROINTESTINAL [x]                 NEUROLOGIC    [x]                MUSCLOSKELTAL  [x]                  HEMATOLOGY      PHYSICAL EXAM (check box if normal)/70   Pulse 89   Wt 99.9 kg (220 lb 4.8 oz)   SpO2 98%   BMI 34.50 kg/m          [x]            GENERAL:    [x]       EYES:  ICTERIC   []        YES  []                    NO  [x]           EXTREMITIES: Clubbing []       Y     [x]           N    [x]           EARS, NOSE, THROAT: Membranes Moist    YES   [x]                   NO []                  [x]           LUNGS:  CLEAR    YES       [x]                  NO    []                                [x]           SKIN: Jaundice           YES       []                  NO    [x]                   Rash: YES       []                  NO    [x]                                     [x]             HEART: Regular Rate          YES       [x]                  NO    []                   Incision Clean:  YES       [x]                  NO    []                                 [x]                    ABDOMEN: Organomegaly YES       []                  NO    [x]                       [x]                    NEUROLOGICAL:  Nonfocal  YES       [x]                  NO    []                       [x]                    Hernia YES       []                  NO    [x]                   PSYCHIATRIC:  Appropriate  YES       [x]                  NO    []                       OTHER:                                                                                                   PAIN SCALE:: 3

## 2023-09-18 NOTE — TELEPHONE ENCOUNTER
"Phosphorus low. Message from Dr. Bermudez:    \"For his low phos, can we   - Ask him about diarrhea and food intake   - Check a vitamin D and PTH level please   - And then recheck his phos level in 2-3 days?\"    No diarrhea, eating well. Feels great. Vitamin D level added to this morning's labs, unable to add PTH. Will get phosphorus and PTH drawn on Thursday morning as he already has a lab appt for that day.     Needs release form for Casey to have a massage because of eliquis. Letter in MyChart.    Lab told Casey that labs are supposed to be weekly. From liver standpoint, he is fine for every other week labs. Will reach out to kidney coordinator to be sure that is ok with kidney team also.     "

## 2023-09-19 LAB
MYCOPHENOLATE SERPL LC/MS/MS-MCNC: 2.23 MG/L (ref 1–3.5)
MYCOPHENOLATE-G SERPL LC/MS/MS-MCNC: 57.8 MG/L (ref 30–95)
TME LAST DOSE: NORMAL H
TME LAST DOSE: NORMAL H

## 2023-09-20 ENCOUNTER — DOCUMENTATION ONLY (OUTPATIENT)
Dept: FAMILY MEDICINE | Facility: CLINIC | Age: 68
End: 2023-09-20
Payer: MEDICARE

## 2023-09-20 ENCOUNTER — LAB (OUTPATIENT)
Dept: LAB | Facility: CLINIC | Age: 68
End: 2023-09-20
Attending: FAMILY MEDICINE
Payer: COMMERCIAL

## 2023-09-20 ENCOUNTER — TELEPHONE (OUTPATIENT)
Dept: TRANSPLANT | Facility: CLINIC | Age: 68
End: 2023-09-20

## 2023-09-20 DIAGNOSIS — Z94.4 LIVER TRANSPLANT RECIPIENT (H): ICD-10-CM

## 2023-09-20 DIAGNOSIS — T38.0X5A STEROID-INDUCED HYPERGLYCEMIA: ICD-10-CM

## 2023-09-20 DIAGNOSIS — M1A.9XX1 TOPHACEOUS GOUT: ICD-10-CM

## 2023-09-20 DIAGNOSIS — E83.39 HYPOPHOSPHATEMIA: Primary | ICD-10-CM

## 2023-09-20 DIAGNOSIS — Z94.0 HISTORY OF KIDNEY TRANSPLANT: ICD-10-CM

## 2023-09-20 DIAGNOSIS — I77.82 ANCA-ASSOCIATED VASCULITIS (H): ICD-10-CM

## 2023-09-20 DIAGNOSIS — R73.9 STEROID-INDUCED HYPERGLYCEMIA: Primary | ICD-10-CM

## 2023-09-20 DIAGNOSIS — L40.50 PSORIATIC ARTHRITIS (H): ICD-10-CM

## 2023-09-20 DIAGNOSIS — E83.39 HYPOPHOSPHATEMIA: ICD-10-CM

## 2023-09-20 DIAGNOSIS — T38.0X5A STEROID-INDUCED HYPERGLYCEMIA: Primary | ICD-10-CM

## 2023-09-20 DIAGNOSIS — Z94.4 HISTORY OF LIVER TRANSPLANT (H): ICD-10-CM

## 2023-09-20 DIAGNOSIS — R73.9 STEROID-INDUCED HYPERGLYCEMIA: ICD-10-CM

## 2023-09-20 DIAGNOSIS — E03.9 HYPOTHYROIDISM, UNSPECIFIED TYPE: ICD-10-CM

## 2023-09-20 DIAGNOSIS — Z94.0 KIDNEY REPLACED BY TRANSPLANT: ICD-10-CM

## 2023-09-20 LAB
ALBUMIN MFR UR ELPH: 11.6 MG/DL
ALBUMIN UR-MCNC: NEGATIVE MG/DL
APPEARANCE UR: CLEAR
BILIRUB UR QL STRIP: NEGATIVE
COLOR UR AUTO: YELLOW
CREAT UR-MCNC: 59.2 MG/DL
DEPRECATED CALCIDIOL+CALCIFEROL SERPL-MC: 37 UG/L (ref 20–75)
GLUCOSE UR STRIP-MCNC: 100 MG/DL
HBA1C MFR BLD: 5.2 % (ref 0–5.6)
HGB UR QL STRIP: NEGATIVE
KETONES UR STRIP-MCNC: NEGATIVE MG/DL
LEUKOCYTE ESTERASE UR QL STRIP: NEGATIVE
NITRATE UR QL: NEGATIVE
PH UR STRIP: 7 [PH] (ref 5–7)
PHOSPHATE SERPL-MCNC: 1.4 MG/DL (ref 2.5–4.5)
PROT/CREAT 24H UR: 0.2 MG/MG CR (ref 0–0.2)
PTH-INTACT SERPL-MCNC: 24 PG/ML (ref 15–65)
SP GR UR STRIP: 1.01 (ref 1–1.03)
TSH SERPL DL<=0.005 MIU/L-ACNC: 0.73 UIU/ML (ref 0.3–4.2)
URATE SERPL-MCNC: 5.3 MG/DL (ref 3.4–7)
UROBILINOGEN UR STRIP-ACNC: 0.2 E.U./DL

## 2023-09-20 PROCEDURE — 83036 HEMOGLOBIN GLYCOSYLATED A1C: CPT

## 2023-09-20 PROCEDURE — 84156 ASSAY OF PROTEIN URINE: CPT

## 2023-09-20 PROCEDURE — 81003 URINALYSIS AUTO W/O SCOPE: CPT | Mod: QW

## 2023-09-20 PROCEDURE — 84100 ASSAY OF PHOSPHORUS: CPT

## 2023-09-20 PROCEDURE — 84443 ASSAY THYROID STIM HORMONE: CPT

## 2023-09-20 PROCEDURE — 36415 COLL VENOUS BLD VENIPUNCTURE: CPT

## 2023-09-20 PROCEDURE — 83516 IMMUNOASSAY NONANTIBODY: CPT

## 2023-09-20 PROCEDURE — 83970 ASSAY OF PARATHORMONE: CPT

## 2023-09-20 PROCEDURE — 83876 ASSAY MYELOPEROXIDASE: CPT

## 2023-09-20 PROCEDURE — 86800 THYROGLOBULIN ANTIBODY: CPT

## 2023-09-20 PROCEDURE — 84550 ASSAY OF BLOOD/URIC ACID: CPT

## 2023-09-20 PROCEDURE — 86376 MICROSOMAL ANTIBODY EACH: CPT

## 2023-09-20 NOTE — PROGRESS NOTES
Patient said there should be an order for a A1C from you. I am not seeing that order. Could you please place that order for his lab only appointment tomorrow? Thank you.

## 2023-09-20 NOTE — TELEPHONE ENCOUNTER
Patient Call: General  Route to LPN    Reason for call: Casey Quinones wife says that Casey is experiencing symptoms from low phos level and would lik e return call asap.    Call back needed? Yes    Return Call Needed  Same as documented in contacts section  When to return call?: Same day: Route High Priority

## 2023-09-20 NOTE — TELEPHONE ENCOUNTER
"Call to Apoorva. She said yesterday Casey started having increased fatigue, muscle and joint aches, and just generally not feeling great. They thought it could be related to his low phosphorus. Requested that tomorrow morning's labs be done this afternoon. Call to [place] lab to see if they can scheduled an appt for 1630, Apoorva unable to make call at this time. Labs scheduled for 1615 at [place].   Message sent to Dr. Bermudez to see if she would like to start at phos supplement. Dr. Bermudez's response:    \"Feel free to give him: 1.3 mmol/kg of elemental phosphorus (up to a maximum of 100 mmol) - this can be given in three to four divided doses over a 24-hour period.     Then we should recheck in 2-3 days after initiation.\"    Got recommendation for phosphorus supplement of \"phospha 250 neutra\" from kidney and liver coordinator supervisor. But was warned that sometimes insurance doesn't cover it. She also recommended a high phosphorus diet.     Called to Apoorva and Casey. Asked if they would want to try the supplement, they said go ahead and send it. Prescription sent to preferred pharmacy. Discussed high phos diet.     ADDENDUM:  Casey having more side effects, diarrhea and severe joint pain, this morning 9/21. Phospha 250 on back order at his pharmacy. Wife asking if he can get an IV infusion. Message to Dr. Bermudez and Dr. Dutta. Dr. Dutta would prefer an oral supplement over IV infusion, suggested neutra phos. Discontinued phospha 250 and ordered neutra phos packets. [employer] message sent to Casey and Apoorva.       "

## 2023-09-21 ENCOUNTER — TELEPHONE (OUTPATIENT)
Dept: ONCOLOGY | Facility: CLINIC | Age: 68
End: 2023-09-21

## 2023-09-21 LAB — THYROPEROXIDASE AB SERPL-ACNC: <10 IU/ML

## 2023-09-21 NOTE — TELEPHONE ENCOUNTER
Apoorva called to say that neutra phos ordered this morning was not covered by insurance as the drug had been discontinued by  in July 2023.     Reordered phospha 250 neutral to be filled by  specialty pharmacy.     Call to Apoorva to let her know. She said that Casey's insurance needs a preauth called in. Message sent to Dalila Rios.

## 2023-09-21 NOTE — TELEPHONE ENCOUNTER
PA Initiation    Medication: PHOSPHOROUS [phone] MG PO TABS  Insurance Company: [employer] - Phone [phone] Fax [phone]  Pharmacy Filling the Rx: [facility] - [place], [place] - [address]  Filling Pharmacy Phone: [phone]  Filling Pharmacy Fax: [phone]  Start Date: 9/21/2023  PA Done via by phone  PA# [id number]

## 2023-09-22 ENCOUNTER — TELEPHONE (OUTPATIENT)
Dept: GASTROENTEROLOGY | Facility: CLINIC | Age: 68
End: 2023-09-22

## 2023-09-22 ENCOUNTER — TELEPHONE (OUTPATIENT)
Dept: TRANSPLANT | Facility: CLINIC | Age: 68
End: 2023-09-22
Payer: MEDICARE

## 2023-09-22 DIAGNOSIS — E83.39 HYPOPHOSPHATEMIA: ICD-10-CM

## 2023-09-22 DIAGNOSIS — E83.39 HYPOPHOSPHATEMIA: Primary | ICD-10-CM

## 2023-09-22 LAB
MYELOPEROXIDASE AB SER IA-ACNC: <0.3 U/ML
MYELOPEROXIDASE AB SER IA-ACNC: NEGATIVE
PROTEINASE3 AB SER IA-ACNC: <1 U/ML
PROTEINASE3 AB SER IA-ACNC: NEGATIVE
THYROGLOB AB SERPL IA-ACNC: <20 IU/ML

## 2023-09-22 NOTE — TELEPHONE ENCOUNTER
Called clearscript to check on status on PA was informed that the PA is pending at this time can take 24-72 hours  to hear back on an outcome

## 2023-09-22 NOTE — TELEPHONE ENCOUNTER
Patient Call: General  Route to LPN    Reason for call: Apoorva patient's wife is calling regarding script that was sent over to [facility] for medication potassium phosphate monobasic 500 mg tablet then it was canceled due to needing a prior Auth, they contacted their insurance company and was told that they could get K-Phos -2 and they would like it sent to their local pharmacy [facility] [address] [facility] phone [phone] and fax number [phone]  Wife is requesting a call when order is faxed over.    Call back needed? Yes    Return Call Needed  Same as documented in contacts section  When to return call?: Same day: Route High Priority

## 2023-09-22 NOTE — TELEPHONE ENCOUNTER
Spoke with Apoorva:    Checked with ratna Vora to order KPhos 500mg daily.    Sent to local pharmacy.

## 2023-09-22 NOTE — TELEPHONE ENCOUNTER
PRIOR AUTHORIZATION DENIED    Medication: PHOSPHA 250 NEUTRAL [phone] MG PO TABS  Insurance Company: [employer] - Phone [phone] Fax [phone]  Denial Date: 9/22/2023  Denial Rational:   Appeal Information:   Patient Notified: Yes     Per ins a previous PA was already attempted and denied. We did not submit a PA request prior to this one and have not received a denial fax.

## 2023-09-25 ENCOUNTER — TELEPHONE (OUTPATIENT)
Dept: TRANSPLANT | Facility: CLINIC | Age: 68
End: 2023-09-25

## 2023-09-25 ENCOUNTER — LAB (OUTPATIENT)
Dept: LAB | Facility: CLINIC | Age: 68
End: 2023-09-25
Payer: COMMERCIAL

## 2023-09-25 ENCOUNTER — INFUSION THERAPY VISIT (OUTPATIENT)
Dept: INFUSION THERAPY | Facility: CLINIC | Age: 68
End: 2023-09-25
Attending: INTERNAL MEDICINE
Payer: COMMERCIAL

## 2023-09-25 VITALS
RESPIRATION RATE: 18 BRPM | SYSTOLIC BLOOD PRESSURE: 146 MMHG | DIASTOLIC BLOOD PRESSURE: 92 MMHG | OXYGEN SATURATION: 98 % | TEMPERATURE: 98.1 F | HEART RATE: 95 BPM

## 2023-09-25 DIAGNOSIS — Z94.0 KIDNEY REPLACED BY TRANSPLANT: ICD-10-CM

## 2023-09-25 DIAGNOSIS — E83.39 HYPOPHOSPHATEMIA: ICD-10-CM

## 2023-09-25 DIAGNOSIS — R19.7 DIARRHEA: ICD-10-CM

## 2023-09-25 DIAGNOSIS — Z48.298 AFTERCARE FOLLOWING ORGAN TRANSPLANT: ICD-10-CM

## 2023-09-25 DIAGNOSIS — R79.89 LOW SERUM PHOSPHATE: ICD-10-CM

## 2023-09-25 DIAGNOSIS — E83.39 HYPOPHOSPHATEMIA: Primary | ICD-10-CM

## 2023-09-25 DIAGNOSIS — Z94.4 LIVER REPLACED BY TRANSPLANT (H): ICD-10-CM

## 2023-09-25 DIAGNOSIS — R94.5 ABNORMAL RESULTS OF LIVER FUNCTION STUDIES: Primary | ICD-10-CM

## 2023-09-25 DIAGNOSIS — R79.89 LOW SERUM PHOSPHATE: Primary | ICD-10-CM

## 2023-09-25 DIAGNOSIS — Z79.899 ENCOUNTER FOR LONG-TERM CURRENT USE OF MEDICATION: ICD-10-CM

## 2023-09-25 DIAGNOSIS — Z94.4 TRANSPLANTED LIVER (H): ICD-10-CM

## 2023-09-25 DIAGNOSIS — N18.2 ANEMIA IN STAGE 2 CHRONIC KIDNEY DISEASE: ICD-10-CM

## 2023-09-25 DIAGNOSIS — Z94.0 KIDNEY REPLACED BY TRANSPLANT: Primary | ICD-10-CM

## 2023-09-25 DIAGNOSIS — D63.1 ANEMIA IN STAGE 2 CHRONIC KIDNEY DISEASE: ICD-10-CM

## 2023-09-25 DIAGNOSIS — Z94.4 LIVER TRANSPLANT RECIPIENT (H): ICD-10-CM

## 2023-09-25 LAB
ALBUMIN MFR UR ELPH: 57.4 MG/DL
ALBUMIN SERPL BCG-MCNC: 3.9 G/DL (ref 3.5–5.2)
ALBUMIN UR-MCNC: 30 MG/DL
ALP SERPL-CCNC: 267 U/L (ref 40–129)
ALT SERPL W P-5'-P-CCNC: 158 U/L (ref 0–70)
ANION GAP SERPL CALCULATED.3IONS-SCNC: 13 MMOL/L (ref 7–15)
APPEARANCE UR: CLEAR
AST SERPL W P-5'-P-CCNC: 59 U/L (ref 0–45)
BACTERIA #/AREA URNS HPF: ABNORMAL /HPF
BASOPHILS # BLD MANUAL: 0.2 10E3/UL (ref 0–0.2)
BASOPHILS NFR BLD MANUAL: 2 %
BILIRUB DIRECT SERPL-MCNC: <0.2 MG/DL (ref 0–0.3)
BILIRUB SERPL-MCNC: 0.5 MG/DL
BILIRUB UR QL STRIP: ABNORMAL
BUN SERPL-MCNC: 18.8 MG/DL (ref 8–23)
CALCIUM SERPL-MCNC: 9.5 MG/DL (ref 8.8–10.2)
CHLORIDE SERPL-SCNC: 104 MMOL/L (ref 98–107)
COLOR UR AUTO: YELLOW
CREAT SERPL-MCNC: 0.97 MG/DL (ref 0.67–1.17)
CREAT UR-MCNC: 202 MG/DL
DEPRECATED HCO3 PLAS-SCNC: 22 MMOL/L (ref 22–29)
EGFRCR SERPLBLD CKD-EPI 2021: 86 ML/MIN/1.73M2
EOSINOPHIL # BLD MANUAL: 0.2 10E3/UL (ref 0–0.7)
EOSINOPHIL NFR BLD MANUAL: 2 %
ERYTHROCYTE [DISTWIDTH] IN BLOOD BY AUTOMATED COUNT: 21.7 % (ref 10–15)
GLUCOSE SERPL-MCNC: 108 MG/DL (ref 70–99)
GLUCOSE UR STRIP-MCNC: NEGATIVE MG/DL
HCT VFR BLD AUTO: 40.7 % (ref 40–53)
HGB BLD-MCNC: 11.8 G/DL (ref 13.3–17.7)
HGB UR QL STRIP: NEGATIVE
KETONES UR STRIP-MCNC: NEGATIVE MG/DL
LEUKOCYTE ESTERASE UR QL STRIP: NEGATIVE
LYMPHOCYTES # BLD MANUAL: 0.5 10E3/UL (ref 0.8–5.3)
LYMPHOCYTES NFR BLD MANUAL: 6 %
MAGNESIUM SERPL-MCNC: 1.4 MG/DL (ref 1.7–2.3)
MCH RBC QN AUTO: 27.5 PG (ref 26.5–33)
MCHC RBC AUTO-ENTMCNC: 29 G/DL (ref 31.5–36.5)
MCV RBC AUTO: 95 FL (ref 78–100)
METAMYELOCYTES # BLD MANUAL: 0.1 10E3/UL
METAMYELOCYTES NFR BLD MANUAL: 1 %
MONOCYTES # BLD MANUAL: 1.1 10E3/UL (ref 0–1.3)
MONOCYTES NFR BLD MANUAL: 13 %
MYELOCYTES # BLD MANUAL: 1.4 10E3/UL
MYELOCYTES NFR BLD MANUAL: 16 %
NEUTROPHILS # BLD MANUAL: 5 10E3/UL (ref 1.6–8.3)
NEUTROPHILS NFR BLD MANUAL: 58 %
NITRATE UR QL: NEGATIVE
PATH REV: ABNORMAL
PH UR STRIP: 7 [PH] (ref 5–7)
PHOSPHATE SERPL-MCNC: 1.8 MG/DL (ref 2.5–4.5)
PLAT MORPH BLD: ABNORMAL
PLATELET # BLD AUTO: 257 10E3/UL (ref 150–450)
POTASSIUM SERPL-SCNC: 4 MMOL/L (ref 3.4–5.3)
PROMYELOCYTES # BLD MANUAL: 0.2 10E3/UL
PROMYELOCYTES NFR BLD MANUAL: 2 %
PROT SERPL-MCNC: 6.5 G/DL (ref 6.4–8.3)
PROT/CREAT 24H UR: 0.28 MG/MG CR (ref 0–0.2)
RBC # BLD AUTO: 4.29 10E6/UL (ref 4.4–5.9)
RBC #/AREA URNS AUTO: ABNORMAL /HPF
RBC MORPH BLD: ABNORMAL
SODIUM SERPL-SCNC: 139 MMOL/L (ref 136–145)
SP GR UR STRIP: 1.02 (ref 1–1.03)
TACROLIMUS BLD-MCNC: 11.3 UG/L (ref 5–15)
TME LAST DOSE: NORMAL H
TME LAST DOSE: NORMAL H
UROBILINOGEN UR STRIP-ACNC: 0.2 E.U./DL
WBC # BLD AUTO: 8.6 10E3/UL (ref 4–11)
WBC # BLD AUTO: 8.6 10E3/UL (ref 4–11)
WBC #/AREA URNS AUTO: ABNORMAL /HPF

## 2023-09-25 PROCEDURE — 96365 THER/PROPH/DIAG IV INF INIT: CPT

## 2023-09-25 PROCEDURE — 36415 COLL VENOUS BLD VENIPUNCTURE: CPT

## 2023-09-25 PROCEDURE — 96366 THER/PROPH/DIAG IV INF ADDON: CPT

## 2023-09-25 PROCEDURE — 85027 COMPLETE CBC AUTOMATED: CPT

## 2023-09-25 PROCEDURE — 258N000003 HC RX IP 258 OP 636: Performed by: INTERNAL MEDICINE

## 2023-09-25 PROCEDURE — 80197 ASSAY OF TACROLIMUS: CPT

## 2023-09-25 PROCEDURE — 250N000009 HC RX 250: Performed by: INTERNAL MEDICINE

## 2023-09-25 PROCEDURE — 83735 ASSAY OF MAGNESIUM: CPT

## 2023-09-25 PROCEDURE — 85007 BL SMEAR W/DIFF WBC COUNT: CPT | Mod: QW

## 2023-09-25 PROCEDURE — 81001 URINALYSIS AUTO W/SCOPE: CPT

## 2023-09-25 PROCEDURE — 84100 ASSAY OF PHOSPHORUS: CPT

## 2023-09-25 PROCEDURE — 87493 C DIFF AMPLIFIED PROBE: CPT

## 2023-09-25 PROCEDURE — 80053 COMPREHEN METABOLIC PANEL: CPT

## 2023-09-25 PROCEDURE — 82248 BILIRUBIN DIRECT: CPT

## 2023-09-25 PROCEDURE — 87324 CLOSTRIDIUM AG IA: CPT

## 2023-09-25 PROCEDURE — 84156 ASSAY OF PROTEIN URINE: CPT

## 2023-09-25 RX ORDER — METHYLPREDNISOLONE SODIUM SUCCINATE 125 MG/2ML
125 INJECTION, POWDER, LYOPHILIZED, FOR SOLUTION INTRAMUSCULAR; INTRAVENOUS
Status: CANCELLED
Start: 2023-09-25

## 2023-09-25 RX ORDER — ALBUTEROL SULFATE 90 UG/1
1-2 AEROSOL, METERED RESPIRATORY (INHALATION)
Status: CANCELLED
Start: 2023-09-25

## 2023-09-25 RX ORDER — HEPARIN SODIUM (PORCINE) LOCK FLUSH IV SOLN 100 UNIT/ML 100 UNIT/ML
5 SOLUTION INTRAVENOUS
Status: CANCELLED | OUTPATIENT
Start: 2023-09-25

## 2023-09-25 RX ORDER — ALBUTEROL SULFATE 0.83 MG/ML
2.5 SOLUTION RESPIRATORY (INHALATION)
Status: CANCELLED | OUTPATIENT
Start: 2023-09-25

## 2023-09-25 RX ORDER — DIPHENHYDRAMINE HYDROCHLORIDE 50 MG/ML
50 INJECTION INTRAMUSCULAR; INTRAVENOUS
Status: CANCELLED
Start: 2023-09-25

## 2023-09-25 RX ORDER — HEPARIN SODIUM,PORCINE 10 UNIT/ML
5-20 VIAL (ML) INTRAVENOUS DAILY PRN
Status: CANCELLED | OUTPATIENT
Start: 2023-09-25

## 2023-09-25 RX ORDER — MEPERIDINE HYDROCHLORIDE 25 MG/ML
25 INJECTION INTRAMUSCULAR; INTRAVENOUS; SUBCUTANEOUS EVERY 30 MIN PRN
Status: CANCELLED | OUTPATIENT
Start: 2023-09-25

## 2023-09-25 RX ORDER — EPINEPHRINE 1 MG/ML
0.3 INJECTION, SOLUTION, CONCENTRATE INTRAVENOUS EVERY 5 MIN PRN
Status: CANCELLED | OUTPATIENT
Start: 2023-09-25

## 2023-09-25 RX ORDER — EPINEPHRINE 1 MG/ML
0.3 INJECTION, SOLUTION INTRAMUSCULAR; SUBCUTANEOUS EVERY 5 MIN PRN
Status: CANCELLED | OUTPATIENT
Start: 2023-09-25

## 2023-09-25 RX ADMIN — POTASSIUM PHOSPHATE, MONOBASIC AND POTASSIUM PHOSPHATE, DIBASIC 7.5 MMOL: 224; 236 INJECTION, SOLUTION, CONCENTRATE INTRAVENOUS at 14:19

## 2023-09-25 NOTE — TELEPHONE ENCOUNTER
Apoorva, paged, that having trouble with bone and muscle pain from low phosphorus.  Has Diarrhea that is very urgent when he has to go. He is struggling with fatigue.     Kphos on back order at local [facility] order. Apoorva has tried calling around to other pharmacy locations and they were told on  back order.    They have been drinking diet coke, coke zero since has more phosphorus than diet coke, hawaiian punch, milk.     Casey is feeling that he feels worse.     He is on his way to local lab for blood draw now.

## 2023-09-25 NOTE — PROGRESS NOTES
Chief Complaint   Patient presents with    Infusion     Potassium phos     Infusion Nursing Note:  Damion Quinones presents today for infusion of potassium phosphate 7.5 mmol.    Patient seen by provider today: No   present during visit today: Not Applicable.    Intravenous Access:  Peripheral IV placed.      Post Infusion Assessment:  Patient tolerated infusion without incident.  Site patent and intact, free from redness, edema or discomfort.  Access discontinued per protocol.       Discharge Plan:   Patient and/or family verbalized understanding of discharge instructions and all questions answered.  Patient discharged in stable condition accompanied by: self and wife.    Administrations This Visit       potassium phosphate 7.5 mmol in sodium chloride 0.9 % 278 mL intermittent infusion       Admin Date  09/25/2023 Action  $New Bag Dose  7.5 mmol Rate  69.5 mL/hr Route  Intravenous Administered By  Nuzhat Field RN                  /87 (BP Location: Left arm, Patient Position: Sitting, Cuff Size: Adult Regular)   Pulse 87   Temp 98.1  F (36.7  C) (Oral)   Resp 18   SpO2 98%     Chaya Alegre

## 2023-09-25 NOTE — TELEPHONE ENCOUNTER
Called  wife Apoorva     Confirmed picking up oral phos in [facility]   Entered orders for stool culture for diarrhea

## 2023-09-25 NOTE — PATIENT INSTRUCTIONS
Dear Damion Quinones    Thank you for choosing [facility] ([facility]) for your infusion.  The following information is a summary of our appointment as well as important reminders.      If you are a transplant patient and require transplant education, please click on this link: [email or web address].    We look forward in seeing you on your next appointment here at [facility] ([facility]).  Please don t hesitate to call us at [phone] to reschedule any of your appointments or to speak with one of the [facility] registered nurses.  It was a pleasure taking care of you today.    Sincerely,    [facility]  [address] [place] [address]  [address]  Phone:  [phone]

## 2023-09-25 NOTE — TELEPHONE ENCOUNTER
Patient Call: General  Route to LPN    Reason for call: Apoorva Patient's wife calling to inform Coordinator, they are on their way down to [address] patient has a transfusion at 130 pm and if Coordinator is needing anything pre transfusion she will be at [address] and can call her cell number    Call back needed? Yes    Return Call Needed  Same as documented in contacts section  When to return call?: Same day: Route High Priority

## 2023-09-26 ENCOUNTER — TELEPHONE (OUTPATIENT)
Dept: TRANSPLANT | Facility: CLINIC | Age: 68
End: 2023-09-26

## 2023-09-26 ENCOUNTER — TELEPHONE (OUTPATIENT)
Dept: PHARMACY | Facility: CLINIC | Age: 68
End: 2023-09-26

## 2023-09-26 ENCOUNTER — LAB (OUTPATIENT)
Dept: LAB | Facility: CLINIC | Age: 68
End: 2023-09-26
Payer: COMMERCIAL

## 2023-09-26 ENCOUNTER — TELEPHONE (OUTPATIENT)
Dept: TRANSPLANT | Facility: CLINIC | Age: 68
End: 2023-09-26
Payer: MEDICARE

## 2023-09-26 DIAGNOSIS — Z94.0 KIDNEY REPLACED BY TRANSPLANT: ICD-10-CM

## 2023-09-26 DIAGNOSIS — Z94.4 LIVER TRANSPLANT RECIPIENT (H): Primary | ICD-10-CM

## 2023-09-26 DIAGNOSIS — E83.39 HYPOPHOSPHATEMIA: ICD-10-CM

## 2023-09-26 DIAGNOSIS — A49.8 CLOSTRIDIUM DIFFICILE INFECTION: ICD-10-CM

## 2023-09-26 DIAGNOSIS — Z94.4 LIVER TRANSPLANT RECIPIENT (H): ICD-10-CM

## 2023-09-26 LAB
ADV 40+41 DNA STL QL NAA+NON-PROBE: NEGATIVE
ASTRO TYP 1-8 RNA STL QL NAA+NON-PROBE: NEGATIVE
C CAYETANENSIS DNA STL QL NAA+NON-PROBE: NEGATIVE
C DIFF GDH STL QL IA: POSITIVE
C DIFF TOX A+B STL QL IA: POSITIVE
C DIFF TOX B STL QL: POSITIVE
CAMPYLOBACTER DNA SPEC NAA+PROBE: NEGATIVE
CRYPTOSP DNA STL QL NAA+NON-PROBE: NEGATIVE
E COLI O157 DNA STL QL NAA+NON-PROBE: NORMAL
E HISTOLYT DNA STL QL NAA+NON-PROBE: NEGATIVE
EAEC ASTA GENE ISLT QL NAA+PROBE: NEGATIVE
EC STX1+STX2 GENES STL QL NAA+NON-PROBE: NEGATIVE
EPEC EAE GENE STL QL NAA+NON-PROBE: NEGATIVE
ETEC LTA+ST1A+ST1B TOX ST NAA+NON-PROBE: NEGATIVE
G LAMBLIA DNA STL QL NAA+NON-PROBE: NEGATIVE
NOROVIRUS GI+II RNA STL QL NAA+NON-PROBE: NEGATIVE
P SHIGELLOIDES DNA STL QL NAA+NON-PROBE: NEGATIVE
RVA RNA STL QL NAA+NON-PROBE: NEGATIVE
SALMONELLA SP RPOD STL QL NAA+PROBE: NEGATIVE
SAPO I+II+IV+V RNA STL QL NAA+NON-PROBE: NEGATIVE
SHIGELLA SP+EIEC IPAH ST NAA+NON-PROBE: NEGATIVE
V CHOLERAE DNA SPEC QL NAA+PROBE: NEGATIVE
VIBRIO DNA SPEC NAA+PROBE: NEGATIVE
Y ENTEROCOL DNA STL QL NAA+PROBE: NEGATIVE

## 2023-09-26 PROCEDURE — 87507 IADNA-DNA/RNA PROBE TQ 12-25: CPT

## 2023-09-26 RX ORDER — TACROLIMUS 1 MG/1
CAPSULE ORAL
Qty: 450 CAPSULE | Refills: 3 | Status: ON HOLD | OUTPATIENT
Start: 2023-09-26 | End: 2023-09-30

## 2023-09-26 RX ORDER — VANCOMYCIN HYDROCHLORIDE 125 MG/1
CAPSULE ORAL
Qty: 112 CAPSULE | Refills: 0 | Status: SHIPPED | OUTPATIENT
Start: 2023-09-26 | End: 2023-11-07

## 2023-09-26 NOTE — TELEPHONE ENCOUNTER
PRIOR AUTHORIZATION DENIED    Medication: PHOSPHOROUS [id number] MG PO TABS  Insurance Company: [employer] - Phone [phone] Fax [phone]  Denial Date: 9/25/2023  Denial Rational:   Denied due to being excluded from patients coverage   Alternatives are: k phos 500mg tablet, k phos 700-305mg tablet  Appeal Information: n/a  Patient Notified: yes

## 2023-09-26 NOTE — PROGRESS NOTES
LFTs increased. US scheduled for tomorrow morning. Lab appt made for after US for hepatic panel and phos.     Received message from  specialty pharmacy pharmacist requesting a profile prescription for k-phos 500 mg as this is what his insurance will cover. Just needs to be on his profile for the future if he needs it.

## 2023-09-26 NOTE — TELEPHONE ENCOUNTER
C. Diff antigen and toxins all positive. Dr. Dutta sent message to start Vancomycin for treatment of active c diff.     Call to Casey and Apoorva with details of treatment. Both verbalized understanding as this is not Casey's first bout of c diff. Currently has a 10 day supply of vanco from last treatment earlier this year.     Vancomycin prescription sent to  specialty pharmacy.   
55

## 2023-09-26 NOTE — TELEPHONE ENCOUNTER
Clinical Pharmacy Consult:                                                      Transplant Specific: 2 month post transplant pharmacy review  Date of Transplant: 06/06/2023  Type of Transplant: kidney and liver  First Transplant: yes  History of rejection: no    Immunosuppression Regimen   TAC 3 mg qAM & 2 mg qPM, Prednisone 10 mg daily, and Myforitic 720 mg qAM & 720 mg qPM  Patient specific goal: 8-10  Most recent level: 11.3 on 9/25/2023  Immunosuppressant Levels:  supraTherapeutic  Pt adherent to lab draws: yes  Scr:   Lab Results   Component Value Date    CR 1.08 07/26/2023    CR 1.11 08/27/2019     Side effects: soft stool often, some tremors    Prophylactic Medications  Antibacterial:  Bactrim 400-800 daily  Scheduled Discontinue Date: Lifelong    Antifungal: Nystatin  Scheduled Discontinue Date: Therapy Complete    Antiviral: Max dose in Liver transplant is Valcyte 450mg once daily   Scheduled Discontinue Date: 3 months    Acid Reducer: Protonix (pantoprazole)  Scheduled Reviewed Date:  MD to evaluate    Thrombosis Prevention: Apixiban 5 mg twice daily  Scheduled Discontinue Date:  MD to evaluate    Blood Pressure Management  Frequency of home Blood Pressure checks:  Infrequent at home, they are getting frequent checks at office visits  Most recent home BP:  127/87  Patient Blood pressure goal: <140/90  Patient blood pressure at goal:  yes  Hospitalizations/ER visits since last assessment:  0    Med rec/DUR performed: yes  Med Rec Discrepancies: no    Spoke with Apoorva today. Casey is not having a good week. His liver enzyme is way up and they can't figure out why. He hs having a lot of pain in his joint. His phosphorous dropped quite a bit and they were having so much trouble getting a phosphorous that is covered by his insurance. We have found one that is cover and will have the rx on his file for future use since she paid out of pocket for some already. He is also experiencing a lot fo stomach cramps along  with soft barely formed stools. It is not quite diarrhea but he has to go quite often.  Apoorva confirmed dose decrease on Tac to 3AM/2PM. She denied any missed dose, said he has some tremors. But she is unsure about other side effects since everything could be related to his liver functions.    BP: not checking at home often - in clinic too much. BP is getting checked in clinic and have been mostly at goal.    No other questions or concerns. Follow up in 1 month.    Flakito Roman, Pharm D  [facility]

## 2023-09-26 NOTE — TELEPHONE ENCOUNTER
ISSUE:   Tacrolimus IR level 11.3 on 9/25, goal 8-10, dose 3 mg BID.    PLAN:   Please call patient and confirm this was an accurate 12-hour trough. Verify Tacrolimus IR dose 3 mg BID. Confirm no new medications or illness. Confirm no missed doses. If accurate trough and accurate dose, decrease Tacrolimus IR dose to 3 mg AM, 2 mg PM and repeat labs in 1 week.    Cindy Clay, RN      OUTCOME:   Spoke with Apoorva,  they confirm accurate trough level and current dose 3 mg BID. Patient confirmed dose change to 3 mg am, 2 mg pm and to repeat labs in 1 weeks. Orders sent to [facility] pharmacy for dose change and lab for repeat labs. Apoorva voiced understanding of plan.     Charito Nixon LPN

## 2023-09-27 ENCOUNTER — HOSPITAL ENCOUNTER (OUTPATIENT)
Facility: CLINIC | Age: 68
Setting detail: OBSERVATION
Discharge: HOME | End: 2023-09-30
Attending: EMERGENCY MEDICINE | Admitting: SURGERY
Payer: COMMERCIAL

## 2023-09-27 ENCOUNTER — APPOINTMENT (OUTPATIENT)
Dept: GENERAL RADIOLOGY | Facility: CLINIC | Age: 68
End: 2023-09-27
Attending: EMERGENCY MEDICINE
Payer: COMMERCIAL

## 2023-09-27 ENCOUNTER — LAB (OUTPATIENT)
Dept: LAB | Facility: CLINIC | Age: 68
End: 2023-09-27
Payer: COMMERCIAL

## 2023-09-27 ENCOUNTER — TELEPHONE (OUTPATIENT)
Dept: TRANSPLANT | Facility: CLINIC | Age: 68
End: 2023-09-27

## 2023-09-27 ENCOUNTER — APPOINTMENT (OUTPATIENT)
Dept: CT IMAGING | Facility: CLINIC | Age: 68
End: 2023-09-27
Attending: EMERGENCY MEDICINE
Payer: COMMERCIAL

## 2023-09-27 ENCOUNTER — HOSPITAL ENCOUNTER (OUTPATIENT)
Dept: ULTRASOUND IMAGING | Facility: CLINIC | Age: 68
Discharge: HOME | End: 2023-09-27
Attending: INTERNAL MEDICINE | Admitting: INTERNAL MEDICINE
Payer: COMMERCIAL

## 2023-09-27 DIAGNOSIS — Z94.0 KIDNEY REPLACED BY TRANSPLANT: ICD-10-CM

## 2023-09-27 DIAGNOSIS — Z94.4 LIVER TRANSPLANT RECIPIENT (H): ICD-10-CM

## 2023-09-27 DIAGNOSIS — Z94.4 STATUS POST LIVER TRANSPLANTATION (H): ICD-10-CM

## 2023-09-27 DIAGNOSIS — R94.5 ABNORMAL RESULTS OF LIVER FUNCTION STUDIES: ICD-10-CM

## 2023-09-27 DIAGNOSIS — Z94.4 TRANSPLANTED LIVER (H): ICD-10-CM

## 2023-09-27 DIAGNOSIS — M1A.9XX1 TOPHACEOUS GOUT: ICD-10-CM

## 2023-09-27 DIAGNOSIS — M79.89 SWELLING OF LIMB: ICD-10-CM

## 2023-09-27 DIAGNOSIS — M06.4 INFLAMMATORY POLYARTHROPATHY (H): Primary | ICD-10-CM

## 2023-09-27 LAB
ALBUMIN SERPL BCG-MCNC: 3.5 G/DL (ref 3.5–5.2)
ALBUMIN SERPL BCG-MCNC: 3.6 G/DL (ref 3.5–5.2)
ALBUMIN UR-MCNC: 70 MG/DL
ALP SERPL-CCNC: 317 U/L (ref 40–129)
ALP SERPL-CCNC: 318 U/L (ref 40–129)
ALT SERPL W P-5'-P-CCNC: 121 U/L (ref 0–70)
ALT SERPL W P-5'-P-CCNC: 123 U/L (ref 0–70)
ANION GAP SERPL CALCULATED.3IONS-SCNC: 14 MMOL/L (ref 7–15)
APPEARANCE UR: CLEAR
AST SERPL W P-5'-P-CCNC: 53 U/L (ref 0–45)
AST SERPL W P-5'-P-CCNC: 56 U/L (ref 0–45)
BACTERIA #/AREA URNS HPF: ABNORMAL /HPF
BASOPHILS # BLD MANUAL: 0.1 10E3/UL (ref 0–0.2)
BASOPHILS NFR BLD MANUAL: 1 %
BILIRUB DIRECT SERPL-MCNC: 0.7 MG/DL (ref 0–0.3)
BILIRUB SERPL-MCNC: 1.1 MG/DL
BILIRUB SERPL-MCNC: 1.1 MG/DL
BILIRUB UR QL STRIP: ABNORMAL
BUN SERPL-MCNC: 18.8 MG/DL (ref 8–23)
CALCIUM SERPL-MCNC: 9.3 MG/DL (ref 8.8–10.2)
CHLORIDE SERPL-SCNC: 99 MMOL/L (ref 98–107)
COLOR UR AUTO: ABNORMAL
CREAT SERPL-MCNC: 0.89 MG/DL (ref 0.67–1.17)
DEPRECATED HCO3 PLAS-SCNC: 21 MMOL/L (ref 22–29)
EGFRCR SERPLBLD CKD-EPI 2021: >90 ML/MIN/1.73M2
EOSINOPHIL # BLD MANUAL: 0.1 10E3/UL (ref 0–0.7)
EOSINOPHIL NFR BLD MANUAL: 1 %
ERYTHROCYTE [DISTWIDTH] IN BLOOD BY AUTOMATED COUNT: 21.2 % (ref 10–15)
GLUCOSE SERPL-MCNC: 175 MG/DL (ref 70–99)
GLUCOSE UR STRIP-MCNC: 50 MG/DL
HCT VFR BLD AUTO: 38.7 % (ref 40–53)
HGB BLD-MCNC: 11.4 G/DL (ref 13.3–17.7)
HGB UR QL STRIP: NEGATIVE
HOLD SPECIMEN: NORMAL
KETONES UR STRIP-MCNC: NEGATIVE MG/DL
LACTATE SERPL-SCNC: 1.6 MMOL/L (ref 0.7–2)
LEUKOCYTE ESTERASE UR QL STRIP: NEGATIVE
LYMPHOCYTES # BLD MANUAL: 0.4 10E3/UL (ref 0.8–5.3)
LYMPHOCYTES NFR BLD MANUAL: 4 %
MAGNESIUM SERPL-MCNC: 1.6 MG/DL (ref 1.7–2.3)
MCH RBC QN AUTO: 27.4 PG (ref 26.5–33)
MCHC RBC AUTO-ENTMCNC: 29.5 G/DL (ref 31.5–36.5)
MCV RBC AUTO: 93 FL (ref 78–100)
METAMYELOCYTES # BLD MANUAL: 2.2 10E3/UL
METAMYELOCYTES NFR BLD MANUAL: 20 %
MONOCYTES # BLD MANUAL: 1.1 10E3/UL (ref 0–1.3)
MONOCYTES NFR BLD MANUAL: 10 %
MUCOUS THREADS #/AREA URNS LPF: PRESENT /LPF
MYELOCYTES # BLD MANUAL: 0.6 10E3/UL
MYELOCYTES NFR BLD MANUAL: 5 %
NEUTROPHILS # BLD MANUAL: 6.6 10E3/UL (ref 1.6–8.3)
NEUTROPHILS NFR BLD MANUAL: 59 %
NITRATE UR QL: NEGATIVE
NT-PROBNP SERPL-MCNC: 994 PG/ML (ref 0–900)
PH UR STRIP: 6 [PH] (ref 5–7)
PHOSPHATE SERPL-MCNC: 2.3 MG/DL (ref 2.5–4.5)
PLAT MORPH BLD: ABNORMAL
PLATELET # BLD AUTO: 231 10E3/UL (ref 150–450)
POTASSIUM SERPL-SCNC: 4.6 MMOL/L (ref 3.4–5.3)
PROT SERPL-MCNC: 6.5 G/DL (ref 6.4–8.3)
PROT SERPL-MCNC: 6.5 G/DL (ref 6.4–8.3)
RBC # BLD AUTO: 4.16 10E6/UL (ref 4.4–5.9)
RBC MORPH BLD: ABNORMAL
RBC URINE: 1 /HPF
SODIUM SERPL-SCNC: 134 MMOL/L (ref 135–145)
SP GR UR STRIP: 1.03 (ref 1–1.03)
SQUAMOUS EPITHELIAL: <1 /HPF
URATE SERPL-MCNC: 5 MG/DL (ref 3.4–7)
UROBILINOGEN UR STRIP-MCNC: 2 MG/DL
WBC # BLD AUTO: 11.2 10E3/UL (ref 4–11)
WBC URINE: 7 /HPF

## 2023-09-27 PROCEDURE — 99221 1ST HOSP IP/OBS SF/LOW 40: CPT | Mod: FS | Performed by: NURSE PRACTITIONER

## 2023-09-27 PROCEDURE — 80076 HEPATIC FUNCTION PANEL: CPT | Performed by: PATHOLOGY

## 2023-09-27 PROCEDURE — 83735 ASSAY OF MAGNESIUM: CPT | Performed by: EMERGENCY MEDICINE

## 2023-09-27 PROCEDURE — 74176 CT ABD & PELVIS W/O CONTRAST: CPT | Mod: 26 | Performed by: RADIOLOGY

## 2023-09-27 PROCEDURE — 93975 VASCULAR STUDY: CPT

## 2023-09-27 PROCEDURE — 87086 URINE CULTURE/COLONY COUNT: CPT | Performed by: EMERGENCY MEDICINE

## 2023-09-27 PROCEDURE — 36415 COLL VENOUS BLD VENIPUNCTURE: CPT | Performed by: PATHOLOGY

## 2023-09-27 PROCEDURE — 36415 COLL VENOUS BLD VENIPUNCTURE: CPT | Performed by: SURGERY

## 2023-09-27 PROCEDURE — 83880 ASSAY OF NATRIURETIC PEPTIDE: CPT | Performed by: EMERGENCY MEDICINE

## 2023-09-27 PROCEDURE — 71046 X-RAY EXAM CHEST 2 VIEWS: CPT | Mod: 26 | Performed by: RADIOLOGY

## 2023-09-27 PROCEDURE — 99285 EMERGENCY DEPT VISIT HI MDM: CPT | Performed by: EMERGENCY MEDICINE

## 2023-09-27 PROCEDURE — 250N000012 HC RX MED GY IP 250 OP 636 PS 637: Performed by: NURSE PRACTITIONER

## 2023-09-27 PROCEDURE — 87040 BLOOD CULTURE FOR BACTERIA: CPT | Performed by: EMERGENCY MEDICINE

## 2023-09-27 PROCEDURE — 258N000003 HC RX IP 258 OP 636: Performed by: NURSE PRACTITIONER

## 2023-09-27 PROCEDURE — G1010 CDSM STANSON: HCPCS | Mod: GC | Performed by: RADIOLOGY

## 2023-09-27 PROCEDURE — 96365 THER/PROPH/DIAG IV INF INIT: CPT

## 2023-09-27 PROCEDURE — 81001 URINALYSIS AUTO W/SCOPE: CPT | Performed by: EMERGENCY MEDICINE

## 2023-09-27 PROCEDURE — 74176 CT ABD & PELVIS W/O CONTRAST: CPT | Mod: MG

## 2023-09-27 PROCEDURE — 93975 VASCULAR STUDY: CPT | Mod: 26 | Performed by: RADIOLOGY

## 2023-09-27 PROCEDURE — 84550 ASSAY OF BLOOD/URIC ACID: CPT | Performed by: EMERGENCY MEDICINE

## 2023-09-27 PROCEDURE — 36415 COLL VENOUS BLD VENIPUNCTURE: CPT | Performed by: NURSE PRACTITIONER

## 2023-09-27 PROCEDURE — 250N000011 HC RX IP 250 OP 636: Performed by: NURSE PRACTITIONER

## 2023-09-27 PROCEDURE — 85027 COMPLETE CBC AUTOMATED: CPT | Performed by: EMERGENCY MEDICINE

## 2023-09-27 PROCEDURE — 36415 COLL VENOUS BLD VENIPUNCTURE: CPT | Performed by: EMERGENCY MEDICINE

## 2023-09-27 PROCEDURE — 85007 BL SMEAR W/DIFF WBC COUNT: CPT | Performed by: EMERGENCY MEDICINE

## 2023-09-27 PROCEDURE — 250N000013 HC RX MED GY IP 250 OP 250 PS 637: Performed by: NURSE PRACTITIONER

## 2023-09-27 PROCEDURE — 80053 COMPREHEN METABOLIC PANEL: CPT | Performed by: EMERGENCY MEDICINE

## 2023-09-27 PROCEDURE — 84100 ASSAY OF PHOSPHORUS: CPT | Performed by: PATHOLOGY

## 2023-09-27 PROCEDURE — 99223 1ST HOSP IP/OBS HIGH 75: CPT | Mod: FS

## 2023-09-27 PROCEDURE — 71046 X-RAY EXAM CHEST 2 VIEWS: CPT

## 2023-09-27 PROCEDURE — 120N000011 HC R&B TRANSPLANT UMMC

## 2023-09-27 PROCEDURE — 99285 EMERGENCY DEPT VISIT HI MDM: CPT | Mod: 25 | Performed by: EMERGENCY MEDICINE

## 2023-09-27 PROCEDURE — 87799 DETECT AGENT NOS DNA QUANT: CPT | Performed by: INTERNAL MEDICINE

## 2023-09-27 PROCEDURE — 83605 ASSAY OF LACTIC ACID: CPT | Performed by: SURGERY

## 2023-09-27 RX ORDER — PANTOPRAZOLE SODIUM 40 MG/1
40 TABLET, DELAYED RELEASE ORAL
Status: DISCONTINUED | OUTPATIENT
Start: 2023-09-28 | End: 2023-09-30 | Stop reason: HOSPADM

## 2023-09-27 RX ORDER — URSODIOL 250 MG/1
250 TABLET, FILM COATED ORAL 2 TIMES DAILY
Status: DISCONTINUED | OUTPATIENT
Start: 2023-09-27 | End: 2023-09-30 | Stop reason: HOSPADM

## 2023-09-27 RX ORDER — ALLOPURINOL 100 MG/1
300 TABLET ORAL DAILY
Status: DISCONTINUED | OUTPATIENT
Start: 2023-09-28 | End: 2023-09-30 | Stop reason: HOSPADM

## 2023-09-27 RX ORDER — TACROLIMUS 1 MG/1
3 CAPSULE ORAL
Status: DISCONTINUED | OUTPATIENT
Start: 2023-09-28 | End: 2023-09-28

## 2023-09-27 RX ORDER — TACROLIMUS 1 MG/1
2 CAPSULE ORAL
Status: DISCONTINUED | OUTPATIENT
Start: 2023-09-27 | End: 2023-09-28

## 2023-09-27 RX ORDER — VALGANCICLOVIR 450 MG/1
450 TABLET, FILM COATED ORAL DAILY
Status: DISCONTINUED | OUTPATIENT
Start: 2023-09-28 | End: 2023-09-29

## 2023-09-27 RX ORDER — METOPROLOL TARTRATE 25 MG/1
25 TABLET, FILM COATED ORAL 2 TIMES DAILY
Status: DISCONTINUED | OUTPATIENT
Start: 2023-09-27 | End: 2023-09-30 | Stop reason: HOSPADM

## 2023-09-27 RX ORDER — SULFAMETHOXAZOLE AND TRIMETHOPRIM 400; 80 MG/1; MG/1
1 TABLET ORAL DAILY
Status: DISCONTINUED | OUTPATIENT
Start: 2023-09-28 | End: 2023-09-30 | Stop reason: HOSPADM

## 2023-09-27 RX ORDER — MAGNESIUM SULFATE HEPTAHYDRATE 40 MG/ML
2 INJECTION, SOLUTION INTRAVENOUS ONCE
Status: COMPLETED | OUTPATIENT
Start: 2023-09-27 | End: 2023-09-27

## 2023-09-27 RX ORDER — LIDOCAINE 40 MG/G
CREAM TOPICAL
Status: DISCONTINUED | OUTPATIENT
Start: 2023-09-27 | End: 2023-09-30 | Stop reason: HOSPADM

## 2023-09-27 RX ORDER — PREDNISONE 10 MG/1
10 TABLET ORAL DAILY
Status: DISCONTINUED | OUTPATIENT
Start: 2023-09-28 | End: 2023-09-30 | Stop reason: HOSPADM

## 2023-09-27 RX ORDER — ONDANSETRON 2 MG/ML
4 INJECTION INTRAMUSCULAR; INTRAVENOUS EVERY 6 HOURS PRN
Status: DISCONTINUED | OUTPATIENT
Start: 2023-09-27 | End: 2023-09-30 | Stop reason: HOSPADM

## 2023-09-27 RX ORDER — PROCHLORPERAZINE MALEATE 5 MG
5 TABLET ORAL EVERY 6 HOURS PRN
Status: DISCONTINUED | OUTPATIENT
Start: 2023-09-27 | End: 2023-09-30 | Stop reason: HOSPADM

## 2023-09-27 RX ORDER — ACETAMINOPHEN 325 MG/1
325 TABLET ORAL EVERY 6 HOURS PRN
Status: DISCONTINUED | OUTPATIENT
Start: 2023-09-27 | End: 2023-09-30 | Stop reason: HOSPADM

## 2023-09-27 RX ORDER — ONDANSETRON 4 MG/1
4 TABLET, ORALLY DISINTEGRATING ORAL EVERY 6 HOURS PRN
Status: DISCONTINUED | OUTPATIENT
Start: 2023-09-27 | End: 2023-09-30 | Stop reason: HOSPADM

## 2023-09-27 RX ORDER — PROCHLORPERAZINE 25 MG
12.5 SUPPOSITORY, RECTAL RECTAL EVERY 12 HOURS PRN
Status: DISCONTINUED | OUTPATIENT
Start: 2023-09-27 | End: 2023-09-30 | Stop reason: HOSPADM

## 2023-09-27 RX ORDER — LEVOTHYROXINE SODIUM 88 UG/1
88 TABLET ORAL DAILY
Status: DISCONTINUED | OUTPATIENT
Start: 2023-09-28 | End: 2023-09-30 | Stop reason: HOSPADM

## 2023-09-27 RX ORDER — MULTIPLE VITAMINS W/ MINERALS TAB 9MG-400MCG
1 TAB ORAL DAILY
Status: DISCONTINUED | OUTPATIENT
Start: 2023-09-28 | End: 2023-09-30 | Stop reason: HOSPADM

## 2023-09-27 RX ORDER — VANCOMYCIN HYDROCHLORIDE 125 MG/1
125 CAPSULE ORAL 4 TIMES DAILY
Status: DISCONTINUED | OUTPATIENT
Start: 2023-09-27 | End: 2023-09-30 | Stop reason: HOSPADM

## 2023-09-27 RX ADMIN — SODIUM PHOSPHATE, DIBASIC, ANHYDROUS, POTASSIUM PHOSPHATE, MONOBASIC, AND SODIUM PHOSPHATE, MONOBASIC, MONOHYDRATE 500 MG: 852; 155; 130 TABLET, COATED ORAL at 19:49

## 2023-09-27 RX ADMIN — MAGNESIUM SULFATE IN WATER 2 G: 40 INJECTION, SOLUTION INTRAVENOUS at 15:53

## 2023-09-27 RX ADMIN — SODIUM CHLORIDE 1000 ML: 9 INJECTION, SOLUTION INTRAVENOUS at 15:53

## 2023-09-27 RX ADMIN — TACROLIMUS 2 MG: 1 CAPSULE ORAL at 18:02

## 2023-09-27 RX ADMIN — VANCOMYCIN HYDROCHLORIDE 125 MG: 125 CAPSULE ORAL at 19:49

## 2023-09-27 RX ADMIN — MYCOPHENOLIC ACID 540 MG: 360 TABLET, DELAYED RELEASE ORAL at 18:02

## 2023-09-27 RX ADMIN — APIXABAN 5 MG: 5 TABLET, FILM COATED ORAL at 19:49

## 2023-09-27 RX ADMIN — METOPROLOL TARTRATE 25 MG: 25 TABLET, FILM COATED ORAL at 19:49

## 2023-09-27 RX ADMIN — ACETAMINOPHEN 325 MG: 325 TABLET, FILM COATED ORAL at 19:49

## 2023-09-27 RX ADMIN — URSODIOL 250 MG: 250 TABLET, FILM COATED ORAL at 19:49

## 2023-09-27 RX ADMIN — VANCOMYCIN HYDROCHLORIDE 125 MG: 125 CAPSULE ORAL at 18:01

## 2023-09-27 ASSESSMENT — ACTIVITIES OF DAILY LIVING (ADL)
CHANGE_IN_FUNCTIONAL_STATUS_SINCE_ONSET_OF_CURRENT_ILLNESS/INJURY: YES
WALKING_OR_CLIMBING_STAIRS_DIFFICULTY: NO
ADLS_ACUITY_SCORE: 35
DOING_ERRANDS_INDEPENDENTLY_DIFFICULTY: NO
TOILETING_ISSUES: NO
VISION_MANAGEMENT: READERS
DIFFICULTY_EATING/SWALLOWING: NO
DRESSING/BATHING_DIFFICULTY: NO
ADLS_ACUITY_SCORE: 20
CONCENTRATING,_REMEMBERING_OR_MAKING_DECISIONS_DIFFICULTY: NO
WEAR_GLASSES_OR_BLIND: YES
EQUIPMENT_CURRENTLY_USED_AT_HOME: GRAB BAR, TUB/SHOWER
FALL_HISTORY_WITHIN_LAST_SIX_MONTHS: NO
ADLS_ACUITY_SCORE: 33
ADLS_ACUITY_SCORE: 35
ADLS_ACUITY_SCORE: 35
ADLS_ACUITY_SCORE: 20
ADLS_ACUITY_SCORE: 20

## 2023-09-27 NOTE — H&P
[facility]    History and Physical  Solid Organ Transplant     Date of Admission:  9/27/2023    Assessment & Plan   Damion Quinones is a 68 year old male with a past medical history of cirrhosis (alcohol + hemochromatosis), ESKD (IgA nephropathy + ANCA vasculitis), PAF, obesity, HTN, pre-diabetes, rheumatoid and psoriatic arthritis, and CAD. He is now s/p SLK 6/6/2023 complicated by DGF, AF RVR, shock, gout flare, right internal jugular clot, and malnutrition. He presented to the ED with complaints of abdominal pain and generalized swelling.     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 Alk phos, transaminases elevated. Liver US 9/27 with patent doppler.   - Continue Ursodiol     Kidney transplant: Cr 0.9, below baseline Cr ~1-1.2. Ureteral stent removed 8/1/23.     - Renal US pending    Immunosuppressed status secondary to medications:   Maintenance:    - Myfortic 540 mg BID   - Tacrolimus 3 mg QAM / 2 mg QPM. Goal level 8-10.    - Prednisone 10 mg daily d/t gout flare.     Hematology:   Iron deficiency anemia: Follows with anemia clinic outpatient. Hgb 11-12, stable.     Cardiorespiratory:   CAD: PTA statin, beta blocker.   PAF: Continue PTA metoprolol, apixaban.   Bilateral upper and lower extremity edema: Pro . Reports weight gain but weight appears stable. May be r/t gout. Plan:    - BLE US   - BUE US   - TTE    GI/Nutrition: Regular diet  Umbilical hernia: Plan for repair once prednisone dose is 5 mg daily or lower.     Endocrine:   Hypothyroidism: PTA levothyroxine.     Fluid/Electrolytes:   Hypomagnesemia: Continue PTA Mag-Ox.     : No issues.     Rheum:   Gout: Follow with outside rheumatologist and is currently on prednisone 10 mg daily, allopurinol. Has been working on getting a prescription for anakinra outpatient.    - Consult Rheumatology    Infectious disease:   C diff diarrhea: C  "diff positive 9/25. Continue PO Vanco.      Infectious workup:    - 9/26 Enteric panel negative   - 9/25 C diff positive   - 9/27 BCx pending   - 9/27 UCx pending   - 9/27 CMV pending    - 9/27 EBV pending   - 9/27 CT A/P with no acute findings   - 9/27 CXR clear    Prophylaxis: DVT (Apixaban), fall, GI (PPI), PJP (Bactrim), viral (Valcyte x 12 weeks post-transplant)    Disposition: Admit to      GEORGE/Fellow/Resident Provider: Lizbeth Greer, NP [phone]    Faculty: Radha Sykes MD       Diet:  Regular  DVT Prophylaxis: DOAC  Lux Catheter: Not present  Lines: None     Cardiac Monitoring: None    Clinically Significant Risk Factors Present on Admission            # Hypomagnesemia: Lowest Mg = 1.6 mg/dL in last 2 days, will replace as needed    # Drug Induced Coagulation Defect: home medication list includes an anticoagulant medication    # Hypertension: Noted on problem list      # Obesity: Estimated body mass index is 34.43 kg/m  as calculated from the following:    Height as of this encounter: 1.702 m (5' 7\").    Weight as of this encounter: 99.7 kg (219 lb 12.8 oz).              Disposition Plan   Plan for discharge home in 1-2 days.   ______________________________________________________________________    Chief Complaint   Generalized body aches, abdominal pain    History is obtained from the patient; spouse    History of Present Illness   Damion Quinones is a 68 year old male with a past medical history of cirrhosis (alcohol + hemochromatosis), ESKD (IgA nephropathy + ANCA vasculitis), PAF, obesity, HTN, pre-diabetes, rheumatoid and psoriatic arthritis, and CAD. He is now s/p SLK 6/6/2023 complicated by DGF, AF RVR, shock, gout flare, right internal jugular clot, and malnutrition. He presented to the ED with complaints of abdominal pain and generalized swelling.     He reports pain and swelling in bilateral upper extremities that he attributes to gout. He has been following with an outside Rheumatologist " and has not been able to receive PA for anakinra. Recently diagnosed with C diff for which he was started on PO Vanco. Reports abdominal pain, cramping, and ongoing diarrhea.       Review of Systems    The 10 point Review of Systems is negative other than noted in the HPI.    Past Medical History    I have reviewed this patient's medical history and updated it with pertinent information if needed.   Past Medical History:   Diagnosis Date    Alcoholic cirrhosis of liver with ascites (H) 10/11/2019    ANCA-associated vasculitis (H) 2022    C. difficile colitis     Coronary artery disease     [facility] 4/2023 - complete occlusion of RCA    ESRD (end stage renal disease) on dialysis (H)     Gout     HCC (hepatocellular carcinoma) (H) 9/5/2023    History of hemochromatosis 10/11/2019    Hypertension     IgA nephropathy     Obesity     PAF (paroxysmal atrial fibrillation) (H)     Pre-diabetes 2023    Psoriatic arthritis (H)     RA (rheumatoid arthritis) (H)     Status post kidney transplant 06/06/2023    Induction with thymoglobulin 4mg/kg, + DSA CW9    Status post liver transplantation (H) 06/06/2023       Past Surgical History   Past Surgical History:   Procedure Laterality Date    APPENDECTOMY      Removed at 18 Years Old     BENCH KIDNEY  06/06/2023    Procedure: Bench kidney;  Surgeon: Chad Clifton MD;  Location:  OR    [facility] LIVER  06/06/2023    Procedure: Bench liver;  Surgeon: Chad Clifton MD;  Location:  OR    COLONOSCOPY      2014 at [facility]     CV CORONARY ANGIOGRAM N/A 04/27/2023    Procedure: Coronary Angiogram;  Surgeon: Pablo Araujo MD;  Location:  HEART CARDIAC CATH LAB    EYE SURGERY Bilateral     Cataract    H STATISTIC PICC LINE INSERTION >5YR, FAILED Left 06/16/2023    Unable to advance catheter over the axillary area    HERNIA REPAIR      History of bilateral inguinal hernia repair: 10/28/2014. Open hernia repair: 10/2017. Abdominal wound exploration and  debridement 2017    INSERT SHUNT PORTAL TRANSJUGULAR INTRAHEPTIC  2022    IR CVC NON TUNNEL LINE REMOVAL  7/3/2023    IR CVC NON TUNNEL PLACEMENT > 5 YRS  2023    IR CVC TUNNEL PLACEMENT > 5 YRS OF AGE  2023    IR PICC PLACEMENT > 5 YRS OF AGE  2023    IR RENAL BIOPSY RIGHT  2023    RETURN LIVER TRANSPLANT N/A 2023    Procedure: Return liver transplant. Intra-operative ultrasound;  Surgeon: Chad Clifton MD;  Location: [facility] OR    TRANSPLANT KIDNEY RECIPIENT  DONOR N/A 2023    Procedure: Transplant kidney recipient  donor, ureteral stent placement;  Surgeon: Chad Clifton MD;  Location: [facility] OR    TRANSPLANT LIVER RECIPIENT  DONOR N/A 2023    Procedure: Transplant liver recipient  donor;  Surgeon: Chad Clifton MD;  Location: [facility] OR       Prior to Admission Medications   Prior to Admission Medications   Prescriptions Last Dose Informant Patient Reported? Taking?   Alcohol Swabs PADS   No No   Sig: Use to swab the area of the injection or quyen as directed Per insurance coverage   MYFORTIC (BRAND) 180 MG EC tablet   No No   Sig: Take 3 tablets (540 mg) by mouth 2 times daily   Sharps Container MISC   No No   Sig: Use as directed to dispose of needles, lancets and other sharps   allopurinol (ZYLOPRIM) 100 MG tablet   Yes No   Sig: Take 300 mg by mouth daily   apixaban ANTICOAGULANT (ELIQUIS ANTICOAGULANT) 5 MG tablet   No No   Sig: Take 1 tablet (5 mg) by mouth 2 times daily   atorvastatin (LIPITOR) 20 MG tablet   No No   Sig: Take 1 tablet (20 mg) by mouth every evening   blood glucose (NO BRAND SPECIFIED) lancets standard   No No   Sig: To use to test glucose level in the blood Use to test blood sugar  4  times daily as directed. To accompany glucose monitor brands per insurance coverage.   blood glucose (NO BRAND SPECIFIED) test strip   No No   Sig: To use to test glucose level in the blood Use to test blood sugar   4 times daily as directed. To accompany glucose monitor brands per insurance coverage.   blood glucose (NO BRAND SPECIFIED) test strip   No No   Sig: Use to test blood sugar 3 times daily or as directed.   blood glucose monitoring (NO BRAND SPECIFIED) meter device kit   No No   Sig: Use as directed Per insurance coverage   levothyroxine (SYNTHROID/LEVOTHROID) 88 MCG tablet   No No   Sig: Take 1 tablet (88 mcg) by mouth daily   magnesium oxide (MAG-OX) 400 MG tablet   No No   Sig: Take 2 tablets (800 mg) by mouth 2 times daily   metoprolol tartrate (LOPRESSOR) 25 MG tablet   No No   Sig: Take 1 tablet (25 mg) by mouth 2 times daily   multivitamin w/minerals (THERA-VIT-M) tablet   No No   Sig: Take 1 tablet by mouth daily   pantoprazole (PROTONIX) 40 MG EC tablet   No No   Sig: Take 1 tablet (40 mg) by mouth every morning (before breakfast)   phosphorus tablet 250 mg (PHOSPHA 250 NEUTRAL) 250 MG per tablet   No No   Sig: Take 2 tablets (500 mg) by mouth 2 times daily   potassium phosphate, monobasic, (K-PHOS) 500 MG tablet   No No   Sig: Take 1 tablet (500 mg) by mouth 2 times daily   potassium phosphate, monobasic, (K-PHOS) 500 MG tablet   No No   Sig: Take 1 tablet (500 mg) by mouth 2 times daily   predniSONE (DELTASONE) 5 MG tablet   No No   Sig: Take 2 tablets (10 mg) by mouth daily   sulfamethoxazole-trimethoprim (BACTRIM) 400-80 MG tablet   No No   Sig: Take 1 tablet by mouth daily   tacrolimus (GENERIC EQUIVALENT) 1 MG capsule   No No   Sig: Take 3 capsules (3 mg) by mouth every morning AND 2 capsules (2 mg) every evening.   ursodiol (ACTIGALL) 250 MG tablet   No No   Sig: Take 1 tablet (250 mg) by mouth 2 times daily   valGANciclovir (VALCYTE) 450 MG tablet   No No   Sig: Take 1 tablet (450 mg) by mouth daily   vancomycin (VANCOCIN) 125 MG capsule   No No   Sig: Take 1 capsule (125 mg) by mouth every 6 hours for 14 days, THEN 1 capsule (125 mg) every 8 hours for 7 days, THEN 1 capsule (125 mg) every 12 hours  for 14 days, THEN 1 capsule (125 mg) daily for 7 days.      Facility-Administered Medications: None        Physical Exam   Vital Signs: Temp: 98.5  F (36.9  C) Temp src: Oral BP: 106/74 Pulse: 107   Resp: 16 SpO2: 98 % O2 Device: None (Room air)    Weight: 219 lbs 12.8 oz    General Appearance: Appears fatigued  Respiratory: unlabored on RA  Cardiovascular: perfused  GI: umbilical hernia soft  Skin: warm, dry  Other: Generalized edema; BUE warm/erythema noted     Medical Decision Making         Data     I have personally reviewed the following data over the past 24 hrs:    11.2 (H)  \   11.4 (L)   / 231     134 (L) 99 18.8 /  175 (H)   4.6 21 (L) 0.89 \     ALT: 121 (H) AST: 56 (H) AP: 317 (H) TBILI: 1.1   ALB: 3.5 TOT PROTEIN: 6.5 LIPASE: N/A     Trop: N/A BNP: 994 (H)       Imaging results reviewed over the past 24 hrs:   Recent Results (from the past 24 hour(s))   US Liver Transplant Follow Up    Narrative    EXAMINATION: US LIVER TRANSPLANT FOLLOW UP, 9/27/2023 8:26 AM     COMPARISON: 6/20/2023 ultrasound.    HISTORY: Post liver transplant. New AST and ALT elevation    TECHNIQUE:  Gray-scale, color Doppler and spectral flow analysis.    FINDINGS:   There is no ascites.    Liver:   The liver demonstrates normal homogeneous echotexture. No  evidence of a focal hepatic mass.     Bile Ducts: Both the intra- and extrahepatic biliary system are of  normal caliber.  The common bile duct is not seen.    Gallbladder: The gallbladder is surgically absent.    Right kidney:  The right kidney demonstrates atrophic parenchyma with  increased echogenicity. No evidence of a shadowing stone, focal mass  or hydronephrosis.   9.1 cm in long axis dimension.    Pancreas: Visualized portions of the pancreas are normal in  appearance.      Visualized portions of the aorta are unremarkable.    LIVER DOPPLER:  Splenic vein:  Patent continuous normal antegrade direction flow  towards the liver, 46 cm/sec.  Extrahepatic portal vein:   Patent continuous antegrade flow, 48  cm/sec.  Portal vein at anastomosis: Patent continuous antegrade flow, 51  cm/sec.  Intrahepatic portal vein:  Patent continuous antegrade flow, 33  cm/sec.  Right portal vein flow is antegrade, measuring 41 cm/sec.  Left portal vein flow is antegrade, measuring 28 cm/sec.    Inferior vena cava: patent with flow toward the heart throughout..  IVC at anastomosis:  62 cm/sec.  Intrahepatic IVC:  44 cm/sec.  Extrahepatic IVC:  23 cm/sec.    Right, mid, left hepatic veins: Patent with flow towards the inferior  vena cava.    Extrahepatic hepatic artery: Low resistance waveform with flow towards  the liver. 75 cm/sec with resistive index 0.57.  Left hepatic artery: 154 cm/sec with resistive index 0.55.  Right hepatic artery: 33 cm/sec with resistive index 0.5.      Impression    Impression:   1.  Unremarkable appearance of the transplant liver. Increased left  hepatic artery velocity with normal waveform is nonspecific and of  unclear etiology. Otherwise normal Doppler evaluation of the hepatic  vasculature.    ADRIANA BARROS MD         SYSTEM ID:  [id number]   XR Chest 2 Views    Narrative    Exam: XR CHEST 2 VIEWS, 9/27/2023 12:28 PM    Indication: fluid overload    Comparison: X-ray chest 7/3/2023. CT chest 6/16/2023.    Findings:   Single frontal radiograph of the chest. The trachea is midline.  Unchanged widened cardiomediastinal silhouette compared to prior image  7/3/2023. Interval removal left PICC. No focal pulmonary opacities. No  pleural effusion, no pneumothorax. Embolization coils project over the  left upper quadrant, surgical clips over the right upper quadrant. The  bones are unremarkable.      Impression    Impression:   1. No acute appearing focal pulmonary disease.  2. Unchanged widened cardiomediastinal silhouette compared to prior  radiograph 7/3/2023.    I have personally reviewed the examination and initial interpretation  and I agree with the findings.    KEV GOLDBERG  MD APRIL         SYSTEM ID:  [id number]   CT Abdomen Pelvis w/o Contrast    Narrative    EXAMINATION: CT ABDOMEN PELVIS W/O CONTRAST, 9/27/2023 1:54 PM    TECHNIQUE:  Helical CT images from the lung bases through the  symphysis pubis were obtained without IV contrast.     COMPARISON: CT dated 6/10/2023    HISTORY: abdominal pain    FINDINGS:    Abdomen and pelvis: Surgical changes of prior liver transplantation.  Gallbladder is surgically absent. Spleen is within normal limits.  Sequela of prior splenic artery embolization. Surgical clips seen in  the stomach. Pancreas is within normal limits. Adrenal glands are  unremarkable. Atrophic appearing native kidneys without evidence of  hydronephrosis. Mild perinephric fat stranding similar to prior. Right  lower quadrant transplant kidney without evidence of hydronephrosis.  Urinary bladder is unremarkable. Normal caliber small and large bowel  without evidence of obstruction. Moderately-sized bowel containing  umbilical hernia is similar in size compared to prior. No free fluid  or air in the abdomen or pelvis.    Mild diffuse atherosclerotic calcifications. No abdominal aortic  aneurysm. No abnormally enlarged lymph nodes in the abdomen or pelvis.    Lung bases: No focal consolidation.    Bones and soft tissues: Flowing osteophytes along the anterior margin  of the visualized vertebral bodies can be seen with diffuse idiopathic  skeletal hyperostosis. Sclerotic appearance of the femoral heads,  unchanged. No acute or suspicious osseous lesions.      Impression    IMPRESSION:   1. No acute findings in the abdomen or pelvis.   2. Moderately sized bowel containing umbilical hernia is again seen.  No obstruction.    I have personally reviewed the examination and initial interpretation  and I agree with the findings.    MARK ALVAREZ MD         SYSTEM ID:  [id number]

## 2023-09-27 NOTE — CONSULTS
[facility]  Transplant Nephrology Consult  Date of Admission:  9/27/2023  Today's Date: 09/27/2023  Requesting physician: Jalyn Burnett MD    Recommendations:  - Give 1 L NS bolus.  - Check CMV and EBV.  - Follow-up imaging to rule out DVT  - Agree with magnesium sulfate 2 g IV once.      Assessment & Plan   # DDKT (SLK): Stable   - Baseline Creatinine: ~ 1-1.2   - Proteinuria: Minimal (0.2-0.5 grams)   - Date DSA Last Checked: Aug/2023      Latest DSA: No but had DSA to Cq9 w/  at txp   - BK Viremia: No   - Kidney Tx Biopsy: Jun 20, 2023; Result: negative for rejection. Focal ATN   - Transplant Ureteral Stent: Removed      # Liver Tx (SLK):  Increased transaminases this month. Total bilirubin normal. Followed by transplant surgery.     # Immunosuppression: Tacrolimus immediate release (goal 8-10), Mycophenolic acid (dose 540 mg every 12 hours), and Prednisone (dose 10 mg daily)   - Induction with Recent Transplant:   rATG total 2mg/kg, stopped 2/2 sepsis   - Continue with intensive monitoring of immunosuppression for efficacy and toxicity.   - Changes: Not at this time.     # Infection Prophylaxis:   - PJP: Sulfa/TMP (Bactrim)  - CMV: None, prophylaxis completed    # Hypertension: Controlled;  Goal BP: < 140/90   - Volume status: Euvolemic  EDW: 215lbs   - Changes: Not at this time. Continue metoprolol 25 mg PO BID.    # Anemia in Chronic Renal Disease: Hgb: Stable      FEDERICO: Yes, Aranesp for Hgb < 10    - Iron studies: Low iron saturation    # Mineral Bone Disorder:   - Secondary renal hyperparathyroidism; PTH level: Normal (15-65 pg/ml)        On treatment: None  - Vitamin D; level: Normal          On supplement: No  - Calcium; level: Normal           On supplement: No  - Phosphorus; level: slightly Low         On supplement: Yes, phos tablet 500 mg PO BID     # Electrolytes:   - Potassium; level: Normal         On supplement: No  - Magnesium; level: Low           On supplement: No, agree with magnesium sulfate 2 g IV once.  - Bicarbonate; level: slightly Low        On supplement: No    #Clostridium difficile  # ID Workup: c/o abdominal cramping and ongoing diarrhea.  Liver enzymes elevated.  Rule out CMV and EBV viremia.  Continue vancomycin for C. Diff.     9/27/2023   Urine Culture In process    9/27/2023 CMV PCR Needs to be collected    9/27/2023  EBV DNA PCR Quantitative Whole Blood In process    9/27/2023  Blood Culture Peripheral Blood In process    9/27/2023  Blood Culture Peripheral Blood In process    9/26/2023   Enteric Bacteria and Virus Panel negative    9/25/2023  C. Diff antigen and toxin A/B Positive       # Gout: presents 09/27/23 with swelling and pain in his right hand, right arm, and right foot/leg and are similar to gout flares he has had in the past.     - Follow-up imaging to rule out DVT              -Continue allopurinol 300 mg daily.               -Follows with rheumatology at [facility], Dr. Rucker.     # Paroxysmal A-Fib:    -Stable on metoprolol and anticoagulated on apixaban     # ANCA Vasculitis: S/p Rituximab x2              - U/A negative on 8/4. Repeat U/A monthly     # Transplant History:  Etiology of Kidney Failure: IgA Nephropathy and ANCA vasculitis  Tx: DDKT (K) and Liver Tx (K)  Transplant: 6/6/2023 (Liver), 6/6/2023 (Kidney)  Donor Type: Donation after Brain Death Donor Class: Standard Criteria Donor  Crossmatch at time of Tx: negative  DSA at time of Tx: Yes, to Cw9 w/   Significant changes in immunosuppression: None  CMV IgG Ab High Risk Discordance (D+/R-): No  EBV IgG Ab High Risk Discordance (D+/R-): No  Significant transplant-related complications: DGF    Recommendations were communicated to the primary team verbally.    Seen and discussed with Dr. Marrero.    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   He presents today with swelling and pain in his right hand, right arm, and right foot/leg which he reports started late last week and are similar to gout flares he has had in the past.  He also reports ongoing diarrhea and abdominal cramping.  C. Diff positive.  His creatinine today is normal at 0.89.      Review of Systems    The 10 point Review of Systems is negative other than noted in the HPI or here.     Past Medical History    I have reviewed this patient's medical history and updated it with pertinent information if needed.   Past Medical History:   Diagnosis Date    Alcoholic cirrhosis of liver with ascites (H) 10/11/2019    ANCA-associated vasculitis (H) 2022    C. difficile colitis     Coronary artery disease     [facility] 4/2023 - complete occlusion of RCA    ESRD (end stage renal disease) on dialysis (H)     Gout     HCC (hepatocellular carcinoma) (H) 9/5/2023    History of hemochromatosis 10/11/2019    Hypertension     IgA nephropathy     Obesity     PAF (paroxysmal atrial fibrillation) (H)     Pre-diabetes 2023    Psoriatic arthritis (H)     RA (rheumatoid arthritis) (H)     Status post kidney transplant 06/06/2023    Induction with thymoglobulin 4mg/kg, + DSA CW9    Status post liver transplantation (H) 06/06/2023       Past Surgical History   I have reviewed this patient's surgical history and updated it with pertinent information if needed.  Past Surgical History:   Procedure Laterality Date    APPENDECTOMY      Removed at 18 Years Old     BENCH KIDNEY  06/06/2023    Procedure: Bench kidney;  Surgeon: Chad Clifton MD;  Location:  OR    BENCH LIVER  06/06/2023    Procedure: Bench liver;  Surgeon: Chad Clifton MD;  Location:  OR    COLONOSCOPY      2014 at [facility]  CORONARY ANGIOGRAM N/A 2023    Procedure: Coronary Angiogram;  Surgeon: Pablo Araujo MD;  Location:  HEART CARDIAC CATH LAB    EYE SURGERY Bilateral     Cataract    H STATISTIC PICC LINE INSERTION >5YR, FAILED Left 2023    Unable to advance catheter over the axillary area    HERNIA REPAIR      History of bilateral inguinal hernia repair: 10/28/2014. Open hernia repair: 10/2017. Abdominal wound exploration and debridement 2017    INSERT SHUNT PORTAL TRANSJUGULAR INTRAHEPTIC  2022    IR CVC NON TUNNEL LINE REMOVAL  7/3/2023    IR CVC NON TUNNEL PLACEMENT > 5 YRS  2023    IR CVC TUNNEL PLACEMENT > 5 YRS OF AGE  2023    IR PICC PLACEMENT > 5 YRS OF AGE  2023    IR RENAL BIOPSY RIGHT  2023    RETURN LIVER TRANSPLANT N/A 2023    Procedure: Return liver transplant. Intra-operative ultrasound;  Surgeon: Chad Clifton MD;  Location: [facility] OR    TRANSPLANT KIDNEY RECIPIENT  DONOR N/A 2023    Procedure: Transplant kidney recipient  donor, ureteral stent placement;  Surgeon: Chad Clifton MD;  Location: [facility] OR    TRANSPLANT LIVER RECIPIENT  DONOR N/A 2023    Procedure: Transplant liver recipient  donor;  Surgeon: Chad Clifton MD;  [facility]       Family History   I have reviewed this patient's family history and updated it with pertinent information if needed.   Family History   Problem Relation Age of Onset    Lung Cancer Mother     Colon Cancer Father     Lung Cancer Brother     Heart Failure Brother     Kidney Disease Brother        Social History   I have reviewed this patient's social history and updated it with pertinent information if needed. Damion Quinones  reports that he has never smoked. He has never been exposed to tobacco smoke. He has never used smokeless tobacco. He reports that he does not currently use alcohol. He reports that he does not currently use drugs.    Allergies  "  No Known Allergies  Prior to Admission Medications         Physical Exam   Temp  Av.5  F (36.9  C)  Min: 98.5  F (36.9  C)  Max: 98.5  F (36.9  C)      Pulse  Av  Min: 107  Max: 107 Resp  Av  Min: 16  Max: 16  SpO2  Av %  Min: 98 %  Max: 98 %     /74   Pulse 107   Temp 98.5  F (36.9  C) (Oral)   Resp 16   Ht 1.702 m (5' 7\")   Wt 99.7 kg (219 lb 12.8 oz)   SpO2 98%   BMI 34.43 kg/m      Admit Weight: 99.8 kg (220 lb)     GENERAL APPEARANCE: alert and no distress  HENT: mouth without ulcers or lesions  RESP: lungs clear to auscultation - no rales, rhonchi or wheezes  CV: regular rhythm, normal rate, no rub, no murmur  EDEMA: moderate edema RUE and RLE, Mild edema LLE  ABDOMEN: soft, nondistended, nontender, bowel sounds normal  MS: right hand and right  to palpation  SKIN: no rash  PSYCH: mentation appears normal and affect normal/bright    Data   CMP  Recent Labs   Lab 23  1152 23  0924 23  0759 23  1611   *  --  139  --    POTASSIUM 4.6  --  4.0  --    CHLORIDE 99  --  104  --    CO2 21*  --  22  --    ANIONGAP 14  --  13  --    *  --  108*  --    BUN 18.8  --  18.8  --    CR 0.89  --  0.97  --    GFRESTIMATED >90  --  86  --    NAZARIO 9.3  --  9.5  --    MAG 1.6*  --  1.4*  --    PHOS  --  2.3* 1.8* 1.4*   PROTTOTAL 6.5 6.5 6.5  --    ALBUMIN 3.5 3.6 3.9  --    BILITOTAL 1.1 1.1 0.5  --    ALKPHOS 317* 318* 267*  --    AST 56* 53* 59*  --    * 123* 158*  --      CBC  Recent Labs   Lab 23  1152 23  0759   HGB 11.4* 11.8*   WBC 11.2* 8.6  8.6   RBC 4.16* 4.29*   HCT 38.7* 40.7   MCV 93 95   MCH 27.4 27.5   MCHC 29.5* 29.0*   RDW 21.2* 21.7*    257     INRNo lab results found in last 7 days.  ABGNo lab results found in last 7 days.   Urine Studies  Recent Labs   Lab Test 23  1157 23  0759 23  1645 23  1023 23  0715 23  1220   COLOR Dark Yellow* Yellow Yellow Yellow Yellow " Yellow   APPEARANCE Clear Clear Clear Clear Clear Cloudy*   URINEGLC 50* Negative 100* Negative Negative 150*   URINEBILI Small* Small* Negative Negative Negative Negative   URINEKETONE Negative Negative Negative Negative Negative Negative   SG 1.029 1.020 1.015 1.016 1.010 1.014   UBLD Negative Negative Negative Negative Negative Large*   URINEPH 6.0 7.0 7.0 7.0 6.5 5.5   PROTEIN 70* 30* Negative Negative Negative 70*   UROBILINOGEN  --  0.2 0.2  --  0.2  --    NITRITE Negative Negative Negative Negative Negative Negative   LEUKEST Negative Negative Negative Negative Small* Large*   RBCU 1 None Seen  --   --  0-2 >182*   WBCU 7* None Seen  --   --  0-5 56*     No lab results found.  PTH  Recent Labs   Lab Test 09/20/23  1611 08/01/23  0924 05/19/23  0956   PTHI 24 45 67*     Iron Studies  Recent Labs   Lab Test 09/05/23  0702 08/07/23  0726 08/01/23  0924 11/22/22  0848   IRON 32* 28* 23* 68    244 231* 219*   IRONSAT 12* 11* 10* 31   HOLA 485* 440* 506* 429*       IMAGING:  All imaging studies reviewed by me.

## 2023-09-27 NOTE — ED PROVIDER NOTES
History     Chief Complaint   Patient presents with    Generalized Body Aches    Abdominal Pain     HPI  Damion Quinones is a 68 year old male with a past medical history of liver transplant (6/2023), paroxysmal atrial fibrillation, end-stage kidney disease on dialysis, cirrhosis complicated by varices s/p TIPS, recurrent C. Diff diarrhea, hereditary hemochromatosis    who presents to the emergency department with a chief complaint of generalized body aches and swelling.  Swelling is worse in his right arm/hand, particularly in his wrist and he feels it may be consistent with previous gout flares.  However, swelling is also present in his bilateral lower extremities and his left arm.  He also complains of some abdominal pain which he states is consistent with the pain he has had with C. difficile infections in the past.  He states he is currently being treated for C. difficile infection.  His symptoms have been present since last Thursday.  The patient presents to the emergency department today accompanied by his wife who notes that the patient has had difficulty moving around/getting around the house and performing his ADLs.  The patient does have a history of DVT in the past.  He is currently anticoagulated on Eliquis.    The patient had a liver transplant ultrasound earlier today which was unremarkable aside from increased left hepatic artery velocity with normal waveform.  The patient did have labs which showed elevated AST at 53 and elevated ALT at 123 (2 days ago these values were 59 and 158 respectively), bilirubin is 0.7.  2 days ago this was normal.  Patient's phosphorus has improved to 2.3, 2 days ago this was down to 1.8.  The patient does note he has had difficulty finding supplementation for this due to drug shortages.    I have reviewed the Medications, Allergies, Past Medical and Surgical History, and Social History in the Epic system.    EXAMINATION:  LIVER TRANSPLANT FOLLOW UP, 9/27/2023 8:26 AM       COMPARISON: 6/20/2023 ultrasound.     HISTORY: Post liver transplant. New AST and ALT elevation     TECHNIQUE:  Gray-scale, color Doppler and spectral flow analysis.     FINDINGS:   There is no ascites.     Liver:   The liver demonstrates normal homogeneous echotexture. No  evidence of a focal hepatic mass.      Bile Ducts: Both the intra- and extrahepatic biliary system are of  normal caliber.  The common bile duct is not seen.     Gallbladder: The gallbladder is surgically absent.     Right kidney:  The right kidney demonstrates atrophic parenchyma with  increased echogenicity. No evidence of a shadowing stone, focal mass  or hydronephrosis.   9.1 cm in long axis dimension.     Pancreas: Visualized portions of the pancreas are normal in  appearance.       Visualized portions of the aorta are unremarkable.     LIVER DOPPLER:  Splenic vein:  Patent continuous normal antegrade direction flow  towards the liver, 46 cm/sec.  Extrahepatic portal vein:  Patent continuous antegrade flow, 48  cm/sec.  Portal vein at anastomosis: Patent continuous antegrade flow, 51  cm/sec.  Intrahepatic portal vein:  Patent continuous antegrade flow, 33  cm/sec.  Right portal vein flow is antegrade, measuring 41 cm/sec.  Left portal vein flow is antegrade, measuring 28 cm/sec.     Inferior vena cava: patent with flow toward the heart throughout..  IVC at anastomosis:  62 cm/sec.  Intrahepatic IVC:  44 cm/sec.  Extrahepatic IVC:  23 cm/sec.     Right, mid, left hepatic veins: Patent with flow towards the inferior  vena cava.     Extrahepatic hepatic artery: Low resistance waveform with flow towards  the liver. 75 cm/sec with resistive index 0.57.  Left hepatic artery: 154 cm/sec with resistive index 0.55.  Right hepatic artery: 33 cm/sec with resistive index 0.5.                                                                      Impression:   1.  Unremarkable appearance of the transplant liver. Increased left  hepatic artery  velocity with normal waveform is nonspecific and of  unclear etiology. Otherwise normal Doppler evaluation of the hepatic  vasculature.     ADRIANA BARROS MD     Past Medical History:   Diagnosis Date    Alcoholic cirrhosis of liver with ascites (H) 10/11/2019    ANCA-associated vasculitis (H) 2022    C. difficile colitis     Coronary artery disease     [facility] 4/2023 - complete occlusion of RCA    ESRD (end stage renal disease) on dialysis (H)     Gout     HCC (hepatocellular carcinoma) (H) 9/5/2023    History of hemochromatosis 10/11/2019    Hypertension     IgA nephropathy     Obesity     PAF (paroxysmal atrial fibrillation) (H)     Pre-diabetes 2023    Psoriatic arthritis (H)     RA (rheumatoid arthritis) (H)     Status post kidney transplant 06/06/2023    Induction with thymoglobulin 4mg/kg, + DSA CW9    Status post liver transplantation (H) 06/06/2023     Past Surgical History:   Procedure Laterality Date    APPENDECTOMY      Removed at 18 Years Old     BENCH KIDNEY  06/06/2023    Procedure: Bench kidney;  Surgeon: Chad Clifton MD;  Location:  OR    BENCH LIVER  06/06/2023    Procedure: Bench liver;  Surgeon: Chad Clifton MD;  Location:  OR    COLONOSCOPY      2014 at [facility]     CV CORONARY ANGIOGRAM N/A 04/27/2023    Procedure: Coronary Angiogram;  Surgeon: Pablo Araujo MD;  Location:  HEART CARDIAC CATH LAB    EYE SURGERY Bilateral     Cataract    H STATISTIC PICC LINE INSERTION >5YR, FAILED Left 06/16/2023    Unable to advance catheter over the axillary area    HERNIA REPAIR      History of bilateral inguinal hernia repair: 10/28/2014. Open hernia repair: 10/2017. Abdominal wound exploration and debridement 12/27/2017    INSERT SHUNT PORTAL TRANSJUGULAR INTRAHEPTIC  09/26/2022    IR CVC NON TUNNEL LINE REMOVAL  7/3/2023    IR CVC NON TUNNEL PLACEMENT > 5 YRS  6/14/2023    IR CVC TUNNEL PLACEMENT > 5 YRS OF AGE  01/26/2023    IR PICC PLACEMENT > 5 YRS OF AGE   2023    IR RENAL BIOPSY RIGHT  2023    RETURN LIVER TRANSPLANT N/A 2023    Procedure: Return liver transplant. Intra-operative ultrasound;  Surgeon: Chad Clifton MD;  Location: [facility] OR    TRANSPLANT KIDNEY RECIPIENT  DONOR N/A 2023    Procedure: Transplant kidney recipient  donor, ureteral stent placement;  Surgeon: Chad Clifton MD;  Location: [facility] OR    TRANSPLANT LIVER RECIPIENT  DONOR N/A 2023    Procedure: Transplant liver recipient  donor;  Surgeon: Chad Clifton MD;  Location: [facility] OR     No current facility-administered medications for this encounter.     Current Outpatient Medications   Medication    Alcohol Swabs PADS    allopurinol (ZYLOPRIM) 100 MG tablet    apixaban ANTICOAGULANT (ELIQUIS ANTICOAGULANT) 5 MG tablet    atorvastatin (LIPITOR) 20 MG tablet    blood glucose (NO BRAND SPECIFIED) lancets standard    blood glucose (NO BRAND SPECIFIED) test strip    blood glucose (NO BRAND SPECIFIED) test strip    blood glucose monitoring (NO BRAND SPECIFIED) meter device kit    levothyroxine (SYNTHROID/LEVOTHROID) 88 MCG tablet    magnesium oxide (MAG-OX) 400 MG tablet    metoprolol tartrate (LOPRESSOR) 25 MG tablet    multivitamin w/minerals (THERA-VIT-M) tablet    MYFORTIC (BRAND) 180 MG EC tablet    pantoprazole (PROTONIX) 40 MG EC tablet    phosphorus tablet 250 mg (PHOSPHA 250 NEUTRAL) 250 MG per tablet    potassium phosphate, monobasic, (K-PHOS) 500 MG tablet    potassium phosphate, monobasic, (K-PHOS) 500 MG tablet    predniSONE (DELTASONE) 5 MG tablet    [employer]ar[employer] Container MISC    sulfamethoxazole-trimethoprim (BACTRIM) 400-80 MG tablet    tacrolimus (GENERIC EQUIVALENT) 1 MG capsule    ursodiol (ACTIGALL) 250 MG tablet    valGANciclovir (VALCYTE) 450 MG tablet    vancomycin (VANCOCIN) 125 MG capsule     Facility-Administered Medications Ordered in Other Encounters   Medication    sodium chloride (PF) 0.9% PF flush 3-20 mL  "    No Known Allergies  Past medical history, past surgical history, medications, and allergies were reviewed with the patient. Additional pertinent items: None    Social History     Socioeconomic History    Marital status:      Spouse name: Not on file    Number of children: Not on file    Years of education: Not on file    Highest education level: Not on file   Occupational History    Not on file   Tobacco Use    Smoking status: Never     Passive exposure: Never    Smokeless tobacco: Never   Vaping Use    Vaping Use: Never used   Substance and Sexual Activity    Alcohol use: Not Currently     Comment: Last ETOH use was 12/31/2021    Drug use: Not Currently    Sexual activity: Not on file   Other Topics Concern    Parent/sibling w/ CABG, MI or angioplasty before 65F 55M? Not Asked   Social History Narrative    Not on file     Social Determinants of Health     Financial Resource Strain: Not on file   Food Insecurity: Not on file   Transportation Needs: Not on file   Physical Activity: Not on file   Stress: Not on file   Social Connections: Not on file   Interpersonal Safety: Not on file   Housing Stability: Not on file     Social history was reviewed with the patient. Additional pertinent items: None    Review of Systems  A medically appropriate review of systems was performed with pertinent positives and negatives noted in the HPI, and all other systems negative.    Physical Exam   BP: 106/74  Pulse: 107  Temp: 98.5  F (36.9  C)  Resp: 16  Height: 170.2 cm (5' 7\")  Weight: 99.8 kg (220 lb)  SpO2: 98 %      General: Well nourished, well developed, NAD  HEENT: EOMI, anicteric. NCAT, MMM  Neck: no jugular venous distension, supple, nl ROM  Cardiac: Tachycardic rate, regular rhythm. No murmurs, rubs, or gallops. Normal S1, S2.  Intact peripheral pulses  Pulm: CTAB, no stridor, wheezes, rales, rhonchi  Abd: Soft, nontender, nondistended.  No masses palpated.    Skin: Warm and dry to the touch.  Generalized " mottling of the skin  Extremities: Positive for bilateral upper and LE edema, no cyanosis, w/w/p  Neuro: A&Ox3, no gross focal deficits    ED Course        Procedures                     Labs Ordered and Resulted from Time of ED Arrival to Time of ED Departure   COMPREHENSIVE METABOLIC PANEL - Abnormal       Result Value    Sodium 134 (*)     Potassium 4.6      Carbon Dioxide (CO2) 21 (*)     Anion Gap 14      Urea Nitrogen 18.8      Creatinine 0.89      GFR Estimate >90      Calcium 9.3      Chloride 99      Glucose 175 (*)     Alkaline Phosphatase 317 (*)     AST 56 (*)      (*)     Protein Total 6.5      Albumin 3.5      Bilirubin Total 1.1     ROUTINE UA WITH MICROSCOPIC REFLEX TO CULTURE - Abnormal    Color Urine Dark Yellow (*)     Appearance Urine Clear      Glucose Urine 50 (*)     Bilirubin Urine Small (*)     Ketones Urine Negative      Specific Gravity Urine 1.029      Blood Urine Negative      pH Urine 6.0      Protein Albumin Urine 70 (*)     Urobilinogen Urine 2.0      Nitrite Urine Negative      Leukocyte Esterase Urine Negative      Bacteria Urine Few (*)     Mucus Urine Present (*)     RBC Urine 1      WBC Urine 7 (*)     Squamous Epithelials Urine <1     NT PROBNP INPATIENT - Abnormal    N terminal Pro BNP Inpatient 994 (*)    MAGNESIUM - Abnormal    Magnesium 1.6 (*)    CBC WITH PLATELETS AND DIFFERENTIAL - Abnormal    WBC Count 11.2 (*)     RBC Count 4.16 (*)     Hemoglobin 11.4 (*)     Hematocrit 38.7 (*)     MCV 93      MCH 27.4      MCHC 29.5 (*)     RDW 21.2 (*)     Platelet Count 231     DIFFERENTIAL - Abnormal    % Neutrophils 59      % Lymphocytes 4      % Monocytes 10      % Eosinophils 1      % Basophils 1      % Metamyelocytes 20      % Myelocytes 5      Absolute Neutrophils 6.6      Absolute Lymphocytes 0.4 (*)     Absolute Monocytes 1.1      Absolute Eosinophils 0.1      Absolute Basophils 0.1      Absolute Metamyelocytes 2.2 (*)     Absolute Myelocytes 0.6 (*)     RBC  Morphology Confirmed RBC Indices      Platelet Assessment        Value: Automated Count Confirmed. Platelet morphology is normal.   URIC ACID - Normal    Uric Acid 5.0     EBV DNA PCR QUANTITATIVE WHOLE BLOOD   PROTEIN RANDOM URINE   BLOOD CULTURE   BLOOD CULTURE   URINE CULTURE   CMV QUANTITATIVE, PCR            Results for orders placed or performed during the hospital encounter of 09/27/23 (from the past 24 hour(s))   CBC with platelets differential    Narrative    The following orders were created for panel order CBC with platelets differential.  Procedure                               Abnormality         Status                     ---------                               -----------         ------                     CBC with platelets and d...[[id number]]  Abnormal            Final result               Manual Differential[[id number]]          Abnormal            Final result                 Please view results for these tests on the individual orders.   Comprehensive metabolic panel   Result Value Ref Range    Sodium 134 (L) 135 - 145 mmol/L    Potassium 4.6 3.4 - 5.3 mmol/L    Carbon Dioxide (CO2) 21 (L) 22 - 29 mmol/L    Anion Gap 14 7 - 15 mmol/L    Urea Nitrogen 18.8 8.0 - 23.0 mg/dL    Creatinine 0.89 0.67 - 1.17 mg/dL    GFR Estimate >90 >60 mL/min/1.73m2    Calcium 9.3 8.8 - 10.2 mg/dL    Chloride 99 98 - 107 mmol/L    Glucose 175 (H) 70 - 99 mg/dL    Alkaline Phosphatase 317 (H) 40 - 129 U/L    AST 56 (H) 0 - 45 U/L     (H) 0 - 70 U/L    Protein Total 6.5 6.4 - 8.3 g/dL    Albumin 3.5 3.5 - 5.2 g/dL    Bilirubin Total 1.1 <=1.2 mg/dL   Nt probnp inpatient (BNP)   Result Value Ref Range    N terminal Pro BNP Inpatient 994 (H) 0 - 900 pg/mL   Magnesium   Result Value Ref Range    Magnesium 1.6 (L) 1.7 - 2.3 mg/dL   [place] Draw    Narrative    The following orders were created for panel order [place] Draw.  Procedure                               Abnormality         Status                      ---------                               -----------         ------                     Extra Blue Top Tube[[id number]]                              Final result                 Please view results for these tests on the individual orders.   CBC with platelets and differential   Result Value Ref Range    WBC Count 11.2 (H) 4.0 - 11.0 10e3/uL    RBC Count 4.16 (L) 4.40 - 5.90 10e6/uL    Hemoglobin 11.4 (L) 13.3 - 17.7 g/dL    Hematocrit 38.7 (L) 40.0 - 53.0 %    MCV 93 78 - 100 fL    MCH 27.4 26.5 - 33.0 pg    MCHC 29.5 (L) 31.5 - 36.5 g/dL    RDW 21.2 (H) 10.0 - 15.0 %    Platelet Count 231 150 - 450 10e3/uL   Extra Blue Top Tube   Result Value Ref Range    Hold Specimen JI    Uric acid   Result Value Ref Range    Uric Acid 5.0 3.4 - 7.0 mg/dL   Manual Differential   Result Value Ref Range    % Neutrophils 59 %    % Lymphocytes 4 %    % Monocytes 10 %    % Eosinophils 1 %    % Basophils 1 %    % Metamyelocytes 20 %    % Myelocytes 5 %    Absolute Neutrophils 6.6 1.6 - 8.3 10e3/uL    Absolute Lymphocytes 0.4 (L) 0.8 - 5.3 10e3/uL    Absolute Monocytes 1.1 0.0 - 1.3 10e3/uL    Absolute Eosinophils 0.1 0.0 - 0.7 10e3/uL    Absolute Basophils 0.1 0.0 - 0.2 10e3/uL    Absolute Metamyelocytes 2.2 (H) <=0.0 10e3/uL    Absolute Myelocytes 0.6 (H) <=0.0 10e3/uL    RBC Morphology Confirmed RBC Indices     Platelet Assessment  Automated Count Confirmed. Platelet morphology is normal.     Automated Count Confirmed. Platelet morphology is normal.   UA with Microscopic reflex to Culture    Specimen: Urine, Midstream   Result Value Ref Range    Color Urine Dark Yellow (A) Colorless, Straw, Light Yellow, Yellow    Appearance Urine Clear Clear    Glucose Urine 50 (A) Negative mg/dL    Bilirubin Urine Small (A) Negative    Ketones Urine Negative Negative mg/dL    Specific Gravity Urine 1.029 1.003 - 1.035    Blood Urine Negative Negative    pH Urine 6.0 5.0 - 7.0    Protein Albumin Urine 70 (A) Negative mg/dL    Urobilinogen Urine  2.0 Normal, 2.0 mg/dL    Nitrite Urine Negative Negative    Leukocyte Esterase Urine Negative Negative    Bacteria Urine Few (A) None Seen /HPF    Mucus Urine Present (A) None Seen /LPF    RBC Urine 1 <=2 /HPF    WBC Urine 7 (H) <=5 /HPF    Squamous Epithelials Urine <1 <=1 /HPF    Narrative    Urine Culture not indicated   XR Chest 2 Views    Narrative    Exam: XR CHEST 2 VIEWS, 9/27/2023 12:28 PM    Indication: fluid overload    Comparison: X-ray chest 7/3/2023. CT chest 6/16/2023.    Findings:   Single frontal radiograph of the chest. The trachea is midline.  Unchanged widened cardiomediastinal silhouette compared to prior image  7/3/2023. Interval removal left PICC. No focal pulmonary opacities. No  pleural effusion, no pneumothorax. Embolization coils project over the  left upper quadrant, surgical clips over the right upper quadrant. The  bones are unremarkable.      Impression    Impression:   1. No acute appearing focal pulmonary disease.  2. Unchanged widened cardiomediastinal silhouette compared to prior  radiograph 7/3/2023.    I have personally reviewed the examination and initial interpretation  and I agree with the findings.    KEV CHEN MD         SYSTEM ID:  [id number]   CT Abdomen Pelvis w/o Contrast    Narrative    EXAMINATION: CT ABDOMEN PELVIS W/O CONTRAST, 9/27/2023 1:54 PM    TECHNIQUE:  Helical CT images from the lung bases through the  symphysis pubis were obtained without IV contrast.     COMPARISON: CT dated 6/10/2023    HISTORY: abdominal pain    FINDINGS:    Abdomen and pelvis: Surgical changes of prior liver transplantation.  Gallbladder is surgically absent. Spleen is within normal limits.  Sequela of prior splenic artery embolization. Surgical clips seen in  the stomach. Pancreas is within normal limits. Adrenal glands are  unremarkable. Atrophic appearing native kidneys without evidence of  hydronephrosis. Mild perinephric fat stranding similar to prior. Right  lower quadrant  transplant kidney without evidence of hydronephrosis.  Urinary bladder is unremarkable. Normal caliber small and large bowel  without evidence of obstruction. Moderately-sized bowel containing  umbilical hernia is similar in size compared to prior. No free fluid  or air in the abdomen or pelvis.    Mild diffuse atherosclerotic calcifications. No abdominal aortic  aneurysm. No abnormally enlarged lymph nodes in the abdomen or pelvis.    Lung bases: No focal consolidation.    Bones and soft tissues: Flowing osteophytes along the anterior margin  of the visualized vertebral bodies can be seen with diffuse idiopathic  skeletal hyperostosis. Sclerotic appearance of the femoral heads,  unchanged. No acute or suspicious osseous lesions.      Impression    IMPRESSION:   1. No acute findings in the abdomen or pelvis.   2. Moderately sized bowel containing umbilical hernia is again seen.  No obstruction.    I have personally reviewed the examination and initial interpretation  and I agree with the findings.    MARK ALVAREZ MD         SYSTEM ID:  [id number]       Labs, vital signs, and imaging studies were reviewed by me.    Medications   lidocaine 1 % 1 mL (has no administration in time range)   lidocaine (LMX4) cream (has no administration in time range)   sodium chloride (PF) 0.9% PF flush 3 mL (has no administration in time range)   sodium chloride (PF) 0.9% PF flush 3 mL (3 mLs Intracatheter Not Given 9/27/23 1509)   ondansetron (ZOFRAN ODT) ODT tab 4 mg (has no administration in time range)     Or   ondansetron (ZOFRAN) injection 4 mg (has no administration in time range)   prochlorperazine (COMPAZINE) injection 5 mg (has no administration in time range)     Or   prochlorperazine (COMPAZINE) tablet 5 mg (has no administration in time range)     Or   prochlorperazine (COMPAZINE) suppository 12.5 mg (has no administration in time range)   acetaminophen (TYLENOL) tablet 325 mg (has no administration in time range)    allopurinol (ZYLOPRIM) tablet 300 mg (has no administration in time range)   apixaban ANTICOAGULANT (ELIQUIS) tablet 5 mg (has no administration in time range)   levothyroxine (SYNTHROID/LEVOTHROID) tablet 88 mcg (has no administration in time range)   metoprolol tartrate (LOPRESSOR) tablet 25 mg (has no administration in time range)   multivitamin w/minerals (THERA-VIT-M) tablet 1 tablet (has no administration in time range)   mycophenolic acid (MYFORTIC BRAND) EC tablet 540 mg (has no administration in time range)   pantoprazole (PROTONIX) EC tablet 40 mg (has no administration in time range)   phosphorus tablet 250 mg (PHOSPHA 250 NEUTRAL) per tablet 500 mg (has no administration in time range)   predniSONE (DELTASONE) tablet 10 mg (has no administration in time range)   sulfamethoxazole-trimethoprim (BACTRIM) 400-80 MG per tablet 1 tablet (has no administration in time range)   ursodiol (ACTIGALL) tablet 250 mg (has no administration in time range)   valGANciclovir (VALCYTE) tablet 450 mg (has no administration in time range)   vancomycin (VANCOCIN) capsule 125 mg (has no administration in time range)   tacrolimus (GENERIC EQUIVALENT) capsule 3 mg (has no administration in time range)   tacrolimus (GENERIC EQUIVALENT) capsule 2 mg (has no administration in time range)   sodium chloride 0.9% BOLUS 1,000 mL (1,000 mLs Intravenous $New Bag 9/27/23 1550)   magnesium sulfate 2 g in 50 mL sterile water intermittent infusion (2 g Intravenous $New Bag 9/27/23 5489)       Assessments & Plan (with Medical Decision Making)   Damion Quinones is a 68 year old male who presents to the emergency department with swelling, abdominal pain, generalized weakness.  Differential diagnosis includes DVT, cellulitis, gout, fluid overload secondary to CHF, liver disease, renal disease, venous insufficiency, infectious process (UTI, pneumonia, etc.).  Labs, chest x-ray, CT of the abdomen pelvis, ultrasound of the bilateral upper and  lower extremities, and renal transplant ultrasound were ordered to further evaluate the patient.  The patient did have liver transplant ultrasound earlier today.    Laboratory work-up is remarkable for white blood cell count elevated at 11.2.    CT of the abdomen pelvis shows no acute disease process.  Moderately sized bowel containing umbilical hernia without obstruction is redemonstrated.    Critical care was not performed.     Medical Decision Making  The patient's presentation was of high complexity (a chronic illness severe exacerbation, progression, or side effect of treatment).    The patient's evaluation involved:  an assessment requiring an independent historian (patient's wife)  review of 3+ test result(s) ordered prior to this encounter (see separate area of note for details)  ordering and/or review of 3+ test(s) in this encounter (see separate area of note for details)  independent interpretation of testing performed by another health professional (CT)  discussion of management or test interpretation with another health professional (transplant)    The patient's management necessitated moderate risk (prescription drug management including medications given in the ED) and high risk (a decision regarding hospitalization).    CT, x-ray images were personally reviewed by me, I agree with the radiology reads.    I have reviewed the nursing notes.    I have reviewed the findings, diagnosis, plan and need for follow up with the patient.    Patient and their further management were discussed with transplant surgery, to be admitted to their service. Plan was discussed with patient who understands and agrees with plan.    New Prescriptions    No medications on file       Final diagnoses:   Swelling of limb       JYOTHI BURNETT MD  9/27/2023   [facility] EMERGENCY DEPARTMENT       Jyothi Burnett MD  09/27/23 5425

## 2023-09-27 NOTE — TELEPHONE ENCOUNTER
Right arm very swollen, starting to weep, right foot swollen. Weak and can barely walk or stand. Apoorva very concerned. Recommended taking Casey to ED. They are currently at [facility] waiting for lab draw, had his US already, and will head over to [place] ED after.     Message to Dr. Bermudez and Dr. Dutta to see if they have any other suggestions.

## 2023-09-27 NOTE — TELEPHONE ENCOUNTER
Per wife Apoorva;  Casey is not feeling well, Swelling,  arm is weeping and weight gain.  [facility] are [facility], Casey just had a US.

## 2023-09-27 NOTE — PLAN OF CARE
"BP (!) 145/92 (BP Location: Right arm)   Pulse 95   Temp 98.6  F (37  C) (Oral)   Resp 16   Ht 1.702 m (5' 7\")   Wt 99.7 kg (219 lb 12.8 oz)   SpO2 98%   BMI 34.43 kg/m       [id number]  Patient admitted to 7A from ED this evening; wife by bedside- very involved and supportive. BP slightly elevated; all other VSS on RA. Alert and oriented x4; calls appropriately. Denies pain and nausea. BM's have started to slow today; had 2 BM's prior to admission (early this AM per patient report). Adequate urine output; urine remains pavel in appearance. Strict I/O's - patient VERY GOOD about saving output; urinal by bedside and hat in bathroom. Regular diet; good appetite and PO intake. Commode by bedside- per patient request for urgency issues related to c.diff. Right PIV saline locked. Patient in good spirits and hopeful this \"will be a short visit\"; wife very involved in care plan and provides a great support system for patient. Continue plan of care, please notify MD with any changes.              "

## 2023-09-27 NOTE — ED TRIAGE NOTES
Pt arrives to triage c/o body aches, abdominal pain, and right arm/hand edema. Ongoing since last Thursday. Hx liver and kidney transplant 6/6/23     Triage Assessment       Row Name 09/27/23 0905       Triage Assessment (Adult)    Airway WDL WDL       Respiratory WDL    Respiratory WDL WDL       Skin Circulation/Temperature WDL    Skin Circulation/Temperature WDL WDL       Cardiac WDL    Cardiac WDL WDL       Peripheral/Neurovascular WDL    Peripheral Neurovascular WDL WDL       Cognitive/Neuro/Behavioral WDL    Cognitive/Neuro/Behavioral WDL WDL

## 2023-09-28 ENCOUNTER — APPOINTMENT (OUTPATIENT)
Dept: ULTRASOUND IMAGING | Facility: CLINIC | Age: 68
End: 2023-09-28
Attending: EMERGENCY MEDICINE
Payer: COMMERCIAL

## 2023-09-28 ENCOUNTER — APPOINTMENT (OUTPATIENT)
Dept: CARDIOLOGY | Facility: CLINIC | Age: 68
End: 2023-09-28
Attending: NURSE PRACTITIONER
Payer: COMMERCIAL

## 2023-09-28 PROBLEM — Z94.4 STATUS POST LIVER TRANSPLANTATION (H): Status: ACTIVE | Noted: 2023-09-28

## 2023-09-28 LAB
ALBUMIN MFR UR ELPH: 96.6 MG/DL
ALBUMIN SERPL BCG-MCNC: 3.1 G/DL (ref 3.5–5.2)
ALP SERPL-CCNC: 262 U/L (ref 40–129)
ALT SERPL W P-5'-P-CCNC: 86 U/L (ref 0–70)
ANION GAP SERPL CALCULATED.3IONS-SCNC: 13 MMOL/L (ref 7–15)
AST SERPL W P-5'-P-CCNC: 37 U/L (ref 0–45)
BILIRUB DIRECT SERPL-MCNC: 0.54 MG/DL (ref 0–0.3)
BILIRUB SERPL-MCNC: 0.8 MG/DL
BUN SERPL-MCNC: 18.7 MG/DL (ref 8–23)
CALCIUM SERPL-MCNC: 9.1 MG/DL (ref 8.8–10.2)
CHLORIDE SERPL-SCNC: 101 MMOL/L (ref 98–107)
CMV DNA SPEC NAA+PROBE-ACNC: NOT DETECTED IU/ML
CREAT SERPL-MCNC: 0.9 MG/DL (ref 0.67–1.17)
CREAT UR-MCNC: 231 MG/DL
EBV DNA # SPEC NAA+PROBE: NOT DETECTED COPIES/ML
EGFRCR SERPLBLD CKD-EPI 2021: >90 ML/MIN/1.73M2
ERYTHROCYTE [DISTWIDTH] IN BLOOD BY AUTOMATED COUNT: 20.9 % (ref 10–15)
GLUCOSE SERPL-MCNC: 128 MG/DL (ref 70–99)
HCO3 SERPL-SCNC: 21 MMOL/L (ref 22–29)
HCT VFR BLD AUTO: 33.9 % (ref 40–53)
HGB BLD-MCNC: 10.1 G/DL (ref 13.3–17.7)
LACTATE SERPL-SCNC: 1.7 MMOL/L (ref 0.7–2)
LACTATE SERPL-SCNC: 2.3 MMOL/L (ref 0.7–2)
LVEF ECHO: NORMAL
MAGNESIUM SERPL-MCNC: 1.6 MG/DL (ref 1.7–2.3)
MCH RBC QN AUTO: 27.4 PG (ref 26.5–33)
MCHC RBC AUTO-ENTMCNC: 29.8 G/DL (ref 31.5–36.5)
MCV RBC AUTO: 92 FL (ref 78–100)
PHOSPHATE SERPL-MCNC: 2.4 MG/DL (ref 2.5–4.5)
PLATELET # BLD AUTO: 191 10E3/UL (ref 150–450)
POTASSIUM SERPL-SCNC: 3.8 MMOL/L (ref 3.4–5.3)
PROT SERPL-MCNC: 5.8 G/DL (ref 6.4–8.3)
PROT/CREAT 24H UR: 0.42 MG/MG CR (ref 0–0.2)
RBC # BLD AUTO: 3.69 10E6/UL (ref 4.4–5.9)
SODIUM SERPL-SCNC: 135 MMOL/L (ref 135–145)
TACROLIMUS BLD-MCNC: 14.7 UG/L (ref 5–15)
TME LAST DOSE: NORMAL H
TME LAST DOSE: NORMAL H
WBC # BLD AUTO: 8.8 10E3/UL (ref 4–11)

## 2023-09-28 PROCEDURE — 93970 EXTREMITY STUDY: CPT

## 2023-09-28 PROCEDURE — 255N000002 HC RX 255 OP 636: Performed by: STUDENT IN AN ORGANIZED HEALTH CARE EDUCATION/TRAINING PROGRAM

## 2023-09-28 PROCEDURE — 80053 COMPREHEN METABOLIC PANEL: CPT | Performed by: NURSE PRACTITIONER

## 2023-09-28 PROCEDURE — 36415 COLL VENOUS BLD VENIPUNCTURE: CPT | Performed by: SURGERY

## 2023-09-28 PROCEDURE — 99207 PR NO BILLABLE SERVICE THIS VISIT: CPT | Performed by: INTERNAL MEDICINE

## 2023-09-28 PROCEDURE — 93970 EXTREMITY STUDY: CPT | Mod: 76

## 2023-09-28 PROCEDURE — 258N000003 HC RX IP 258 OP 636: Performed by: NURSE PRACTITIONER

## 2023-09-28 PROCEDURE — 93970 EXTREMITY STUDY: CPT | Mod: 26 | Performed by: RADIOLOGY

## 2023-09-28 PROCEDURE — 250N000009 HC RX 250: Performed by: NURSE PRACTITIONER

## 2023-09-28 PROCEDURE — 36415 COLL VENOUS BLD VENIPUNCTURE: CPT | Performed by: NURSE PRACTITIONER

## 2023-09-28 PROCEDURE — 250N000012 HC RX MED GY IP 250 OP 636 PS 637: Performed by: NURSE PRACTITIONER

## 2023-09-28 PROCEDURE — 86200 CCP ANTIBODY: CPT | Performed by: INTERNAL MEDICINE

## 2023-09-28 PROCEDURE — 93970 EXTREMITY STUDY: CPT | Mod: 26 | Performed by: STUDENT IN AN ORGANIZED HEALTH CARE EDUCATION/TRAINING PROGRAM

## 2023-09-28 PROCEDURE — 83605 ASSAY OF LACTIC ACID: CPT | Performed by: SURGERY

## 2023-09-28 PROCEDURE — 99233 SBSQ HOSP IP/OBS HIGH 50: CPT | Mod: FS

## 2023-09-28 PROCEDURE — 80197 ASSAY OF TACROLIMUS: CPT | Performed by: NURSE PRACTITIONER

## 2023-09-28 PROCEDURE — 83735 ASSAY OF MAGNESIUM: CPT | Performed by: NURSE PRACTITIONER

## 2023-09-28 PROCEDURE — 93321 DOPPLER ECHO F-UP/LMTD STD: CPT | Mod: 26 | Performed by: INTERNAL MEDICINE

## 2023-09-28 PROCEDURE — 76776 US EXAM K TRANSPL W/DOPPLER: CPT | Mod: 26 | Performed by: STUDENT IN AN ORGANIZED HEALTH CARE EDUCATION/TRAINING PROGRAM

## 2023-09-28 PROCEDURE — 85027 COMPLETE CBC AUTOMATED: CPT | Performed by: NURSE PRACTITIONER

## 2023-09-28 PROCEDURE — 96376 TX/PRO/DX INJ SAME DRUG ADON: CPT

## 2023-09-28 PROCEDURE — 84100 ASSAY OF PHOSPHORUS: CPT | Performed by: NURSE PRACTITIONER

## 2023-09-28 PROCEDURE — 999N000208 ECHOCARDIOGRAM LIMITED

## 2023-09-28 PROCEDURE — 99255 IP/OBS CONSLTJ NEW/EST HI 80: CPT | Mod: GC | Performed by: INTERNAL MEDICINE

## 2023-09-28 PROCEDURE — 250N000011 HC RX IP 250 OP 636: Performed by: NURSE PRACTITIONER

## 2023-09-28 PROCEDURE — G0378 HOSPITAL OBSERVATION PER HR: HCPCS

## 2023-09-28 PROCEDURE — 93308 TTE F-UP OR LMTD: CPT | Mod: 26 | Performed by: INTERNAL MEDICINE

## 2023-09-28 PROCEDURE — 76776 US EXAM K TRANSPL W/DOPPLER: CPT

## 2023-09-28 PROCEDURE — 250N000013 HC RX MED GY IP 250 OP 250 PS 637: Performed by: NURSE PRACTITIONER

## 2023-09-28 PROCEDURE — 84156 ASSAY OF PROTEIN URINE: CPT | Performed by: NURSE PRACTITIONER

## 2023-09-28 PROCEDURE — 93325 DOPPLER ECHO COLOR FLOW MAPG: CPT | Mod: 26 | Performed by: INTERNAL MEDICINE

## 2023-09-28 RX ORDER — NALOXONE HYDROCHLORIDE 0.4 MG/ML
0.2 INJECTION, SOLUTION INTRAMUSCULAR; INTRAVENOUS; SUBCUTANEOUS
Status: DISCONTINUED | OUTPATIENT
Start: 2023-09-28 | End: 2023-09-30 | Stop reason: HOSPADM

## 2023-09-28 RX ORDER — NALOXONE HYDROCHLORIDE 0.4 MG/ML
0.4 INJECTION, SOLUTION INTRAMUSCULAR; INTRAVENOUS; SUBCUTANEOUS
Status: DISCONTINUED | OUTPATIENT
Start: 2023-09-28 | End: 2023-09-30 | Stop reason: HOSPADM

## 2023-09-28 RX ORDER — SIMETHICONE 125 MG
125 TABLET,CHEWABLE ORAL 4 TIMES DAILY PRN
COMMUNITY

## 2023-09-28 RX ORDER — MAGNESIUM SULFATE HEPTAHYDRATE 40 MG/ML
2 INJECTION, SOLUTION INTRAVENOUS ONCE
Status: COMPLETED | OUTPATIENT
Start: 2023-09-28 | End: 2023-09-28

## 2023-09-28 RX ORDER — SIMETHICONE 80 MG
80 TABLET,CHEWABLE ORAL 4 TIMES DAILY PRN
Status: DISCONTINUED | OUTPATIENT
Start: 2023-09-28 | End: 2023-09-30 | Stop reason: HOSPADM

## 2023-09-28 RX ADMIN — METOPROLOL TARTRATE 25 MG: 25 TABLET, FILM COATED ORAL at 08:46

## 2023-09-28 RX ADMIN — HUMAN ALBUMIN MICROSPHERES AND PERFLUTREN 6 ML: 10; .22 INJECTION, SOLUTION INTRAVENOUS at 09:21

## 2023-09-28 RX ADMIN — PREDNISONE 10 MG: 10 TABLET ORAL at 08:46

## 2023-09-28 RX ADMIN — VANCOMYCIN HYDROCHLORIDE 125 MG: 125 CAPSULE ORAL at 19:44

## 2023-09-28 RX ADMIN — ANAKINRA 100 MG: 100 INJECTION, SOLUTION SUBCUTANEOUS at 17:10

## 2023-09-28 RX ADMIN — SODIUM PHOSPHATE, DIBASIC, ANHYDROUS, POTASSIUM PHOSPHATE, MONOBASIC, AND SODIUM PHOSPHATE, MONOBASIC, MONOHYDRATE 500 MG: 852; 155; 130 TABLET, COATED ORAL at 08:46

## 2023-09-28 RX ADMIN — PANTOPRAZOLE SODIUM 40 MG: 40 TABLET, DELAYED RELEASE ORAL at 08:46

## 2023-09-28 RX ADMIN — APIXABAN 5 MG: 5 TABLET, FILM COATED ORAL at 19:43

## 2023-09-28 RX ADMIN — ACETAMINOPHEN 325 MG: 325 TABLET, FILM COATED ORAL at 03:59

## 2023-09-28 RX ADMIN — Medication 1 TABLET: at 12:25

## 2023-09-28 RX ADMIN — OXYCODONE HYDROCHLORIDE 2.5 MG: 5 TABLET ORAL at 16:06

## 2023-09-28 RX ADMIN — VANCOMYCIN HYDROCHLORIDE 125 MG: 125 CAPSULE ORAL at 16:05

## 2023-09-28 RX ADMIN — MYCOPHENOLIC ACID 540 MG: 360 TABLET, DELAYED RELEASE ORAL at 18:33

## 2023-09-28 RX ADMIN — SODIUM CHLORIDE, POTASSIUM CHLORIDE, SODIUM LACTATE AND CALCIUM CHLORIDE 1000 ML: 600; 310; 30; 20 INJECTION, SOLUTION INTRAVENOUS at 12:59

## 2023-09-28 RX ADMIN — APIXABAN 5 MG: 5 TABLET, FILM COATED ORAL at 08:46

## 2023-09-28 RX ADMIN — OXYCODONE HYDROCHLORIDE 2.5 MG: 5 TABLET ORAL at 09:31

## 2023-09-28 RX ADMIN — METOPROLOL TARTRATE 25 MG: 25 TABLET, FILM COATED ORAL at 19:44

## 2023-09-28 RX ADMIN — MYCOPHENOLIC ACID 540 MG: 360 TABLET, DELAYED RELEASE ORAL at 08:46

## 2023-09-28 RX ADMIN — URSODIOL 250 MG: 250 TABLET, FILM COATED ORAL at 19:43

## 2023-09-28 RX ADMIN — VALGANCICLOVIR 450 MG: 450 TABLET, FILM COATED ORAL at 08:46

## 2023-09-28 RX ADMIN — VANCOMYCIN HYDROCHLORIDE 125 MG: 125 CAPSULE ORAL at 08:46

## 2023-09-28 RX ADMIN — SODIUM PHOSPHATE, DIBASIC, ANHYDROUS, POTASSIUM PHOSPHATE, MONOBASIC, AND SODIUM PHOSPHATE, MONOBASIC, MONOHYDRATE 500 MG: 852; 155; 130 TABLET, COATED ORAL at 19:44

## 2023-09-28 RX ADMIN — SULFAMETHOXAZOLE AND TRIMETHOPRIM 1 TABLET: 400; 80 TABLET ORAL at 08:46

## 2023-09-28 RX ADMIN — LEVOTHYROXINE SODIUM 88 MCG: 88 TABLET ORAL at 08:45

## 2023-09-28 RX ADMIN — ALLOPURINOL 300 MG: 100 TABLET ORAL at 08:46

## 2023-09-28 RX ADMIN — URSODIOL 250 MG: 250 TABLET, FILM COATED ORAL at 08:46

## 2023-09-28 RX ADMIN — VANCOMYCIN HYDROCHLORIDE 125 MG: 125 CAPSULE ORAL at 12:25

## 2023-09-28 RX ADMIN — MAGNESIUM SULFATE IN WATER 2 G: 40 INJECTION, SOLUTION INTRAVENOUS at 09:32

## 2023-09-28 RX ADMIN — TACROLIMUS 3 MG: 1 CAPSULE ORAL at 08:46

## 2023-09-28 ASSESSMENT — ACTIVITIES OF DAILY LIVING (ADL)
ADLS_ACUITY_SCORE: 22
ADLS_ACUITY_SCORE: 20
ADLS_ACUITY_SCORE: 22
ADLS_ACUITY_SCORE: 20

## 2023-09-28 NOTE — PROGRESS NOTES
Transplant Surgery  Inpatient Daily Progress Note  09/28/2023    Assessment & Plan: Damion Quinones is a 68 year old male with a past medical history of cirrhosis (alcohol + hemochromatosis), ESKD (IgA nephropathy + ANCA vasculitis), PAF, obesity, HTN, pre-diabetes, rheumatoid and psoriatic arthritis, and CAD. He is now s/p SLK 6/6/2023 complicated by DGF, AF RVR, shock, gout flare, right internal jugular clot, and malnutrition. He presented to the ED with complaints of abdominal pain and generalized swelling.     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 Alk phos, transaminases elevated. Liver US 9/27 with patent doppler.   - Continue Ursodiol     Kidney transplant: Cr 0.9, below baseline Cr ~1-1.2. Ureteral stent removed 8/1/23.     - Renal US pending    - Repeat 1L bolus     Immunosuppressed status secondary to medications:   Maintenance:    - Myfortic 540 mg BID   - PTA Tacrolimus dose 3 mg QAM / 2 mg QPM. Goal level 8-10. Level 14.7, likely r/t diarrhea. Hold dose tonight, start 1.5 mg BID tomorrow morning, and repeat level.    - Prednisone 10 mg daily d/t gout flare.      Hematology:   Iron deficiency anemia: Follows with anemia clinic outpatient. Will need iron supplementation IV once infection is resolved. Hgb 11-12, stable.      Cardiorespiratory:   CAD; PAF: PTA statin, beta blocker, apixaban.      GI/Nutrition: Regular diet  Umbilical hernia: Plan for repair once prednisone dose is 5 mg daily or lower.      Endocrine:   Hypothyroidism: PTA levothyroxine.      Fluid/Electrolytes:   Hypomagnesemia: Hold PTA Mag-Ox with diarrhea. Replace 2 grams IV today.   Bilateral upper and lower extremity edema: Reports weight gain but weight appears stable. Pro . TTE WNL. Bilateral upper and lower extremity US negative for DVT. May be r/t gout. Plan:    - Rheum consulted as below     : No issues.      Rheum:   Gout: Follow with outside  rheumatologist and is currently on prednisone 10 mg daily, allopurinol. Has been working on getting a prescription for anakinra outpatient.    - PRN Tylenol and oxycodone for pain.    - Rheumatology consulted, appreciate recs.      Infectious disease:   C diff diarrhea: C diff positive 9/25. Continue PO Vanco.       Infectious workup:    - 9/26 Enteric panel negative   - 9/25 C diff positive   - 9/27 BCx NGTD   - 9/27 UCx in process   - 9/27 CMV not detected   - 9/27 EBV not detected   - 9/27 CT A/P with no acute findings   - 9/27 CXR clear     Prophylaxis: DVT (Apixaban), fall, GI (PPI), PJP (Bactrim), viral (Valcyte x 12 weeks post-transplant)     Disposition: 7A     GEORGE/Fellow/Resident Provider: Lizbeth Greer NP #[id number]    Faculty: Radha Sykes MD    __________________________________________________________________  Transplant History:    6/6/2023 (Liver), 6/6/2023 (Kidney), Postoperative day: 114 (Liver), 114 (Kidney)     Interval History: History is obtained from the patient, spouse  Overnight events: No acute events overnight. Continues to report bilateral upper extremity wrist pain. Diarrhea continues.     ROS:   A 10-point review of systems was negative except as noted above.    Curent Meds:   allopurinol  300 mg Oral Daily    apixaban ANTICOAGULANT  5 mg Oral BID    levothyroxine  88 mcg Oral Daily    metoprolol tartrate  25 mg Oral BID    multivitamin w/minerals  1 tablet Oral Daily    mycophenolic acid  540 mg Oral BID IS    pantoprazole  40 mg Oral QAM AC    phosphorus tablet 250 mg  500 mg Oral BID    predniSONE  10 mg Oral Daily    sodium chloride (PF)  3 mL Intracatheter Q8H    sulfamethoxazole-trimethoprim  1 tablet Oral Daily    tacrolimus  2 mg Oral QPM    tacrolimus  3 mg Oral QAM    ursodiol  250 mg Oral BID    valGANciclovir  450 mg Oral Daily    vancomycin  125 mg Oral 4x Daily       Physical Exam:     Admit Weight: 99.8 kg (220 lb)    Current Vitals:   BP (!) 168/113   Pulse 102    "Temp 98.8  F (37.1  C)   Resp 16   Ht 1.702 m (5' 7\")   Wt 99.7 kg (219 lb 12.8 oz)   SpO2 96%   BMI 34.43 kg/m      Vital sign ranges:    Temp:  [98.2  F (36.8  C)-99.5  F (37.5  C)] 98.8  F (37.1  C)  Pulse:  [] 102  Resp:  [16-18] 16  BP: (107-168)/() 168/113  SpO2:  [92 %-98 %] 96 %    General Appearance: in no apparent distress.   Skin: normal, warm, dry, No rashes, induration, or jaundice  Heart: perfused  Lungs: unlabored on RA  Abdomen: soft, non-distended.  : no Lux present  Extremities: edema: BLE +2 edema. BUE scabbing noted on exam.   Neurologic: A&Ox4. Tremor absent.     Frailty Scores          12/27/2022 11/22/2022   Frailty Scores   Final Score Frail Frail   Final Score Number 3 4       Data:   CMP  Recent Labs   Lab 09/28/23  0605 09/27/23  1152 09/27/23  0924    134*  --    POTASSIUM 3.8 4.6  --    CHLORIDE 101 99  --    CO2 21* 21*  --    * 175*  --    BUN 18.7 18.8  --    CR 0.90 0.89  --    GFRESTIMATED >90 >90  --    NAZARIO 9.1 9.3  --    MAG 1.6* 1.6*  --    PHOS 2.4*  --  2.3*   ALBUMIN 3.1* 3.5 3.6   BILITOTAL 0.8 1.1 1.1   ALKPHOS 262* 317* 318*   AST 37 56* 53*   ALT 86* 121* 123*     CBC  Recent Labs   Lab 09/28/23  0605 09/27/23  1152   HGB 10.1* 11.4*   WBC 8.8 11.2*    231       "

## 2023-09-28 NOTE — PHARMACY-CONSULT NOTE
Pharmacist Admission Medication History    Admission medication history is complete. The information provided in this note is only as accurate as the sources available at the time of the update.    Medication reconciliation/reorder completed by provider prior to medication history? Yes    Information Source(s): Patient, Family member, and CareEverywhere/SureScripts via in-person    Pertinent Information:   - Pt stated that wife manages medications. Was able to complete med hx in person with wife.  - Pt had taken most medications pta yesterday (9/27/23)  - Pt is not currently using valganciclovir since completing 3 months of therapy (stopped 9/6/23)  - Pt not using asa, urban-prasanna, BUMEX, folate, or any insulin per wife  - Pt is not using warfarin and is using ELIQUIS  - Pt takes MMF at 1200 and 1600 to avoid DDI outpt  - Pt takes metoprolol 25 mg bid despite conflicting SureScripts information    Changes made to PTA medication list:  Added:   Simethicone 125 mg tab  Deleted:   Potassium phosphate 500 mg tab       Allergies reviewed with patient and updates made in EHR: yes    Medication History Completed By: Parag Shannon [facility] 9/28/2023 11:20 AM    Prior to Admission medications    Medication Sig Last Dose Taking? Auth Provider Long Term End Date   allopurinol (ZYLOPRIM) 100 MG tablet Take 300 mg by mouth daily 9/27/2023 at AM Yes Gary Serrano MD No    apixaban ANTICOAGULANT (ELIQUIS ANTICOAGULANT) 5 MG tablet Take 1 tablet (5 mg) by mouth 2 times daily 9/27/2023 at AM Yes Phillip Marrero MD     atorvastatin (LIPITOR) 20 MG tablet Take 1 tablet (20 mg) by mouth every evening 9/26/2023 at PM Yes Lolis Bermudez MD Yes    levothyroxine (SYNTHROID/LEVOTHROID) 88 MCG tablet Take 1 tablet (88 mcg) by mouth daily 9/27/2023 at AM Yes Lolis Bermudez MD Yes    magnesium oxide (MAG-OX) 400 MG tablet Take 2 tablets (800 mg) by mouth 2 times daily Past Week Yes Chad Clifton MD No     metoprolol tartrate (LOPRESSOR) 25 MG tablet Take 1 tablet (25 mg) by mouth 2 times daily 9/27/2023 at AM Yes Phillip Marrero MD Yes    multivitamin w/minerals (THERA-VIT-M) tablet Take 1 tablet by mouth daily Past Week Yes Chad Clifton MD     MYFORTIC (BRAND) 180 MG EC tablet Take 3 tablets (540 mg) by mouth 2 times daily 9/27/2023 at AM Yes Subhash Dutta MD No    pantoprazole (PROTONIX) 40 MG EC tablet Take 1 tablet (40 mg) by mouth every morning (before breakfast) 9/27/2023 at AM Yes Lolis Bermudez MD No    phosphorus tablet 250 mg (PHOSPHA 250 NEUTRAL) 250 MG per tablet Take 2 tablets (500 mg) by mouth 2 times daily 9/27/2023 at AM Yes Subhash Dutta MD No    predniSONE (DELTASONE) 5 MG tablet Take 2 tablets (10 mg) by mouth daily 9/27/2023 at AM Yes Subhash Dutta MD No    simethicone (MYLICON) 125 MG chewable tablet Take 125 mg by mouth 4 times daily as needed for intestinal gas Past Month at LAN Yes Unknown, Entered By History No    sulfamethoxazole-trimethoprim (BACTRIM) 400-80 MG tablet Take 1 tablet by mouth daily 9/27/2023 at AM Yes Chad Clifton MD No    tacrolimus (GENERIC EQUIVALENT) 1 MG capsule Take 3 capsules (3 mg) by mouth every morning AND 2 capsules (2 mg) every evening. 9/27/2023 at AM Yes Lolis Bermudez MD No    ursodiol (ACTIGALL) 250 MG tablet Take 1 tablet (250 mg) by mouth 2 times daily 9/27/2023 at AM Yes Chad Clifton MD     vancomycin (VANCOCIN) 125 MG capsule Take 1 capsule (125 mg) by mouth every 6 hours for 14 days, THEN 1 capsule (125 mg) every 8 hours for 7 days, THEN 1 capsule (125 mg) every 12 hours for 14 days, THEN 1 capsule (125 mg) daily for 7 days. 9/27/2023 at 1400 Yes Subhash Dutta MD No 11/7/23   Alcohol Swabs PADS Use to swab the area of the injection or quyen as directed Per insurance coverage   Lashay Rachel MD     blood glucose (NO BRAND SPECIFIED) lancets standard To use to test glucose level in the blood Use  to test blood sugar  4  times daily as directed. To accompany glucose monitor brands per insurance coverage.   Lashay Rachel MD     blood glucose (NO BRAND SPECIFIED) test strip Use to test blood sugar 3 times daily or as directed.   Chad Clifton MD     blood glucose (NO BRAND SPECIFIED) test strip To use to test glucose level in the blood Use to test blood sugar  4 times daily as directed. To accompany glucose monitor brands per insurance coverage.   Chad Clifton MD     blood glucose monitoring (NO BRAND SPECIFIED) meter device kit Use as directed Per insurance coverage   Lashay Rachel MD     Sharps Container MISC Use as directed to dispose of needles, lancets and other sharps   Lashay Rachel MD

## 2023-09-28 NOTE — CONSULTS
Rheumatology Consult Note    Damion Quinones MRN# [id number]   Age: 68 year old YOB: 1955     Date of Admission: 9/27/2023    Reason for consult: Gout    Assessment & Plan:     ASSESSMENT:  Damion Quinones is a 68 year old male with a past medical history of cirrhosis (alcohol + hemochromatosis), ESKD (IgA nephropathy + ANCA vasculitis), Paroxysmal AF, obesity, HTN, pre-diabetes, tophaceous gout, psoriatic arthritis, and CAD. He recently had simultaneous liver-kidney transplant 6/6/2023. He presented to the ED on 9/27/23 with complaints of abdominal pain and multiple joints swelling and pain.     DIAGNOSIS:  Acute polyarthritis (bilateral wrist, R MCP, PIP, DIP, bilateral ankles and feet) - likely gout flare  Chronic tophaceous gout   C. Difficile diarrhea  Psoriasis arthritis - in remission  Post liver and kidney transplant    A 68-year-old male with history of longstanding gout and psoriasis arthritis presented with acute polyarthritis on hands, wrists, ankles, and feet. He feels like his joint pain this time is similar to previous gout flare and occurs in the same joint locations. Although I don't see his previous joint fluid analysis, which would be a definitive diagnosis for gout. He does have a few remarkable tophaceous gout in a setting of high uric acid and episodic flare in the past, consistent with gout. This episode of polyarticular peripheral joint pain is most likely also gout, considered its sudden flare, marked tenderness and swelling on exam. Other less-likely causes of acute symmetric polyarthritis that may consider includes reactive arthritis (post C.difficile) and palindromic rheumatoid arthritis. I cannot find his RF or anti-CCP. I will send RA serology and inflammatory markers to fully evaluate his arthritis.     We will treat him with Anakinra (IL-1 inhibitor) 100 mg subcutaneous q 24 hr x 3 doses, if needed can extend to 5 days. We avoid other antiinflammatory options (NSAIDs  and colchicine- post renal transplant, steroid - risk of infection). Regarding his active C. Difficile diarrhea, IL-1 inhibitor can also increase risk of infection (2-3% of serious infection), but the risk is smaller than high-dose steroid. He will continue his Prednisone 10 mg daily.     He has multiple gout flare this year, at least 4th episode this time. The cause of flare is indeterminate but his steroid 10 mg daily as a prophylaxis is clearly not working. He might got dehydration from C. Difficile diarrhea, which triggers gout. He had allopurinol adjusted 1 month ago, which fluctuated uric acid level. That was probably too long to be responsible for this flare. His uric acid on 9/27 (during the this flare) was 5.0. Uric level during the flare is usually lower than his baseline due to increased uric acid secretion. We will continue allopurinol with a goal of uric acid <6 until his tophi resolve or longer. Now he is on Allopurinol 300 mg daily, we will likely increase it to 400 mg daily after his flare subsides. For a flare prophylaxis, he will discuss with his primary rheumatologist, Dr. Rucker at [facility]. We discussed some options other than steroid, e.g. Anakinra daily. He is working on getting an insurance approval for Anakinra outpatient.    He doesn't have other features of spondyloarthritis (no axial pain, no enthesitis, no dactylitis) and his psoriasis has been under good control since he stopped Humira in 8/2022. Noted that Humira doesn't help prevent gout flare, but might help with RA flare, if he had one.     PLAN:  - Anakinra 100 mg subcutanoeous q 24 hr x 3 doses (may extend to 5 days)  - Continue Prednisone 10 mg daily  - RF, anti-CCP, CRP, ESR (ordered)    I discussed the findings and recommendations with the patient.  I communicated the assessment and plan to the consulting team.    Case seen and discussed with Dr. Horne, who agrees with above assessment and plan.     Thank  you Radha Sykes MD for for this interesting consult. Please contact us if there are any questions. We will continue to follow.    Martin Ramirez MD  Internal Medicine, PGY-1  P: [phone]  Rheumatology Service, [facility]    Staff addendum  I performed the history and physical examination of the patient and discussed the management with the resident. I reviewed the available lab and imaging studies. I reviewed the resident s note and agree with the documented findings and plan of care.    Charles Horne MD  Rheumatology      HPI     Damion Quinones is a 68 year old male with a past medical history of cirrhosis (alcohol + hemochromatosis), ESKD (IgA nephropathy + ANCA vasculitis), Paroxysmal AF, obesity, HTN, pre-diabetes, rheumatoid and psoriatic arthritis, and CAD. He recently had simultaneous liver-kidney transplant 6/6/2023. He presented to the ED on 9/27/23 with complaints of abdominal pain and generalized swelling. Rheumatology is consulted for management of joint pain tanya setting of chronic tophaceous gout.     Patient had a long history of gout over decades with infrequent flare up until last year. On 2018, exam didn't show any tophi. However, in a setting of CKD with vasculitis, he had tophi on R 5th digit PIP, Lt olecranon and bilateral 1st MTPs documented on 2022. I don't see prior joint fluid profile. Humira for his psoriasis was discontinued on 8/2022 and he has been on Prednisone daily since with the lowest dose of 10 mg daily. He started having recurrent flare in 1/2023, when his prednisolone was tapered for his vasculitis. He also had subsequent flare in 2/2023 and 6/2023, when he got Anakinra.  He had one mild flare on his right wrist after his last hospitalization and it was managed by increasing steroid. His usual gout flare location are both hands (MCP, PIP, DIP), both wrists, both ankles.  He tried lower steroid down to 7.5 mg daily but pain emerged.    He has persistent  "hyperuricemia, as high as 10.7 on 9/2021. He however preferred not to start ULT. He started HD in 1/2023, which also help clear up his uric acid. During the 6/2023 gout flare during a hospitalization, he was discharged with Allopurinol 100 mg and prednisone for gout prophylaxis. Allopurinol is later uptitrated with current dose of 300 mg daily since 9/1/2023.    He reports swelling and pain that began 6 days ago, affecting multiple digits, right wrist, and bilateral feet. He notes that these symptoms are similar to the gout flares he has had in the past. The pain started on his right ankle, then progressed to bilateral feet swelling and pain around the entire foot. He describes it as appearing like a \"clubfoot\" due to the severe swelling that appear overnight. Bilateral wrists and hands also became swelling with R>L. He increased his prednisone from 10 to 20 mg for a few days but it didn't help much.   He rates his pain as a 7/10. For the past few days, he is unable to walk around his home or perform activities of daily living due to his pain.  Today, he cannot make a handgrip on both hands (R more severe than L).    In regards to his other symptoms, he has abdominal pain ranging from his navel to the anus which he attributes to c-diff. The diarrhea preceded his joint pain. He had 10+ loose stool per day. His wife also reports some small wounds on the right arm that began after his transplant. He has no new psoriasis rash. Patient denies any fevers, chills, weight loss.     14 point review of systems collected and negative if not documented above.    I have reviewed the patients past medical history, past surgical history, current medications, current allergies, family history and social history.     Objective     PHYSICAL EXAM  BP (!) 146/96 (BP Location: Right arm)   Pulse 104   Temp 98.4  F (36.9  C) (Oral)   Resp 16   Ht 1.702 m (5' 7\")   Wt 99.7 kg (219 lb 12.8 oz)   SpO2 96%   BMI 34.43 kg/m    Wt " Readings from Last 4 Encounters:   09/27/23 99.7 kg (219 lb 12.8 oz)   09/18/23 99.9 kg (220 lb 4.8 oz)   09/05/23 99.8 kg (220 lb)   08/29/23 100.7 kg (222 lb)     Exam:  General: Well nourished, well developed, NAD, alert and cooperative  HEENT: anicteric, no jaundice  Cardiac: Regular rhythm. No murmurs, mild tachycardia  Pulm: no respiratory retraction, clear both lungs  Abd: Soft, mild distension, no tenderness, no guarding, small soft hernia around umbilicus    Extremities:   tenderness to palpation and limit ROM of multiple MCP, PIPs, DIP, wrist joints R > L  Flexion deformity on bilateral DIPs  bilateral feet swelling ranging from the digits to the calcaneous, exquisitely tenderness to palpation of both feet  limited dorsiflexion, plantarflexion, and ankle mobility bilaterally.   affected joints are warm, erythematous with evidence of edema  White tophi seen on R 5th DIP area, Lt olecranon    Other labs  HepBcore Ab: NR  HepBsurfaceAg:  NR  HepC: NR  HIV: NR  Tb quant gold: neg     No Known Allergies    Recent Labs  TSH   Date Value Ref Range Status   09/20/2023 0.73 0.30 - 4.20 uIU/mL Final     Lab Results   Component Value Date    URIC 5.0 09/27/2023    URIC 5.3 09/20/2023    URIC 5.2 08/29/2023     Inflammatory markers  No results found for: CRP  Lab Results   Component Value Date    SED 11 01/15/2023        Lab Test 09/28/23  0605 09/27/23  1152 09/25/23  0759   WBC 8.8 11.2* 8.6  8.6   RBC 3.69* 4.16* 4.29*   HGB 10.1* 11.4* 11.8*   HCT 33.9* 38.7* 40.7   MCV 92 93 95   MCH 27.4 27.4 27.5   MCHC 29.8* 29.5* 29.0*   RDW 20.9* 21.2* 21.7*    231 257     BMP:  Recent Labs   Lab Test 09/28/23  0605 09/27/23  1152 09/25/23  0759    134* 139   POTASSIUM 3.8 4.6 4.0   CHLORIDE 101 99 104   CO2 21* 21* 22   ANIONGAP 13 14 13   * 175* 108*   BUN 18.7 18.8 18.8   CR 0.90 0.89 0.97   GFRESTIMATED >90 >90 86   NAZARIO 9.1 9.3 9.5     LFT:  Recent Labs   Lab Test 09/28/23  0605 09/27/23  1152  09/27/23  0924   PROTTOTAL 5.8* 6.5 6.5   ALBUMIN 3.1* 3.5 3.6   BILITOTAL 0.8 1.1 1.1   ALKPHOS 262* 317* 318*   AST 37 56* 53*   ALT 86* 121* 123*       Autoimmunity labs:     No results found for: RHF  No results found for: CCPIGG  No results found for: ANCA  No results found for: K3UFVXP  No results found for: M5NXTTA  No results found for: YANA  No results found for: DNA  No results found for: RNPIGG, SMIGG, SSAIGG, SSBIGG, SCLIGG    IMAGING: no joint imaging found

## 2023-09-28 NOTE — PLAN OF CARE
"Shift: 8452-2750  BP (!) 146/96 (BP Location: Right arm)   Pulse 104   Temp 98.4  F (36.9  C) (Oral)   Resp 16   Ht 1.702 m (5' 7\")   Wt 99.7 kg (219 lb 12.8 oz)   SpO2 96%   BMI 34.43 kg/m       VS- Stable on RA  BG- none  Labs-  AM labs pending   Pain/Nausea/PRN'S- PRN tylenol given for pain. Denies nausea.  Diet- regular   LDA- PIV x1 SL  Gtt/IVF- none   GI- 2 BM's overnight, diarrhea. On PO vanco for C. Diff infection   - voiding, urine Tara   Skin-swollen and painful right arm and hand, edema in lower legs   Activity- Stand by, using bedside commode and urinal   Plan- Rheumatology consult pending , continue with plan of care.  "

## 2023-09-28 NOTE — UTILIZATION REVIEW
"    Admission Status; Secondary Review Determination         Under the authority of the Utilization Management Committee, the utilization review process indicated a secondary review on the above patient.  The review outcome is based on review of the medical records, discussions with staff, and applying clinical experience noted on the date of the review.        ()      Inpatient Status Appropriate - This patient's medical care is consistent with medical management for inpatient care and reasonable inpatient medical practice.      (X) Observation Status Appropriate - This patient does not meet hospital inpatient criteria and is placed in observation status. If this patient's primary payer is Medicare and was admitted as an inpatient, Condition Code 44 should be used and patient status changed to \"observation\".   () Admission Status NOT Appropriate - This patient's medical care is not consistent with medical management for Inpatient or Observation Status.          RATIONALE FOR DETERMINATION     \"Damion Quinones is a 68 year old male with a past medical history of cirrhosis (alcohol + hemochromatosis), ESKD (IgA nephropathy + ANCA vasculitis), PAF, obesity, HTN, pre-diabetes, rheumatoid and psoriatic arthritis, and CAD. He is now s/p SLK 6/6/2023 complicated by DGF, AF RVR, shock, gout flare, right internal jugular clot, and malnutrition. He presented to the ED with complaints of abdominal pain and generalized swelling. \"    The patient is on several immunosuppressive medications.  He was noted to have C. difficile positive status on 25 September and has been on oral vancomycin.  Rheumatology has been consulted for his generalized swelling due to concern for possible gout.    I discussed his case with Lizbeth Greer NP.  The patient is hemodynamically stable awaiting rheumatology consultation and is likely to discharge home soon, observation status is appropriate.    The severity of illness, intensity of service " provided, expected LOS make it  appropriate for hospital observation.        The information on this document is developed by the utilization review team in order for the business office to ensure compliance.  This only denotes the appropriateness of proper admission status and does not reflect the quality of care rendered.         The definitions of Inpatient Status and Observation Status used in making the determination above are those provided in the CMS Coverage Manual, Chapter 1 and Chapter 6, section 70.4.      Sincerely,     BIANKA MONTAGUE MD    Physician Advisor  Utilization Review/ Case Management  [facility].

## 2023-09-28 NOTE — PROGRESS NOTES
[facility]   Transplant Nephrology Progress Note  Date of Admission:  9/27/2023  Today's Date: 09/28/2023    Recommendations:  - Give 1 L LR .  - Agree with magnesium sulfate 2 g IV once.  - follow-up imaging to rule out DVT.  - Will need iron supplementation after infection resolved.    Assessment & Plan   # DDKT (K): Stable   - Baseline Creatinine: ~ 1-1.2   - Proteinuria: Minimal (0.2-0.5 grams)   - Date DSA Last Checked: Aug/2023      Latest DSA: No but had DSA to Cq9 w/  at txp   - BK Viremia: No   - Kidney Tx Biopsy: Jun 20, 2023; Result: negative for rejection. Focal ATN   - Transplant Ureteral Stent: Removed      # Liver Tx (K):  Transaminases Trend down. Total bilirubin normal. Followed by transplant surgery.     # Immunosuppression: Tacrolimus immediate release (goal 8-10), Mycophenolic acid (dose 540 mg every 12 hours), and Prednisone (dose 10 mg daily)   - Induction with Recent Transplant:   rATG total 2mg/kg, stopped 2/2 sepsis   - Continue with intensive monitoring of immunosuppression for efficacy and toxicity.   - Changes: Not at this time.     # Infection Prophylaxis:   - PJP: Sulfa/TMP (Bactrim)  - CMV: None, prophylaxis completed    # Hypertension: Borderline control;  Goal BP: < 140/90   - Volume status: Euvolemic  EDW: 215lbs   - Changes: Not at this time. Continue metoprolol 25 mg PO BID.    # Anemia in Chronic Renal Disease: Hgb: Trend down      FEDERICO: Yes, Aranesp for Hgb < 10    - Iron studies: Low iron saturation, start iron supplementation when infection resolved.    # Mineral Bone Disorder:   - Secondary renal hyperparathyroidism; PTH level: Normal (15-65 pg/ml)        On treatment: None  - Vitamin D; level: Normal          On supplement: No  - Calcium; level: Normal           On supplement: No  - Phosphorus; level: slightly Low         On supplement: Yes, phos tablet 500 mg PO BID     # Electrolytes:   - Potassium; level: Normal          On supplement: No  - Magnesium; level: Low          On supplement: No, agree with magnesium sulfate 2 g IV once.  - Bicarbonate; level: slightly Low        On supplement: No    #Clostridium difficile  # ID Workup: c/o abdominal cramping and ongoing diarrhea.  Liver enzymes elevated.  Rule out CMV and EBV viremia.  On vancomycin 125 mg four times daily for C. Diff.   - 9/27/23 CT a/p: No acute findings in the abdomen or pelvis.      9/27/2023   Urine Culture In process    9/27/2023 CMV PCR Not detected    9/27/2023  EBV DNA PCR Quantitative Whole Blood In process    9/27/2023  Blood Culture Peripheral Blood no growth to date    9/27/2023  Blood Culture Peripheral Blood no growth to date    9/26/2023   Enteric Bacteria and Virus Panel negative    9/25/2023  C. Diff antigen and toxin A/B Positive       # Gout: presented 09/27/23 with swelling and pain in his right hand, right arm, and right foot/leg and are similar to gout flares he has had in the past.     - Follow-up imaging to rule out DVT              -Continue allopurinol 300 mg daily.               -Follows with rheumatology at [facility], Dr. Rucker.     # Paroxysmal A-Fib:    -Stable on metoprolol and anticoagulated on apixaban     # ANCA Vasculitis: S/p Rituximab x2              - U/A negative on 8/4. Repeat U/A monthly     # Transplant History:  Etiology of Kidney Failure: IgA Nephropathy and ANCA vasculitis  Tx: DDKT (K) and Liver Tx (K)  Transplant: 6/6/2023 (Liver), 6/6/2023 (Kidney)  Donor Type: Donation after Brain Death Donor Class: Standard Criteria Donor  Crossmatch at time of Tx: negative  DSA at time of Tx: Yes, to Cw9 w/   Significant changes in immunosuppression: None  CMV IgG Ab High Risk Discordance (D+/R-): No  EBV IgG Ab High Risk Discordance (D+/R-): No  Significant transplant-related complications: DGF      Recommendations were communicated to the primary team verbally.    Seen and discussed with Dr. Marrero.    Landen Laureano,  "JADE CNP   Pager: [phone]    Interval History   Mr. Quinones reports of ongoing joint pain and swelling.  He reports it is in his left wrist as well now.  Lactic acid slightly elevated at 2.3.  Pt reports he was drinking less yesterday d/t NPO status.    His creatinine is 0.90 ( 0605); Stable    I/O last 3 completed shifts:  In: 510 [P.O.:510]  Out: 900 [Urine:900]   Other significant labs/tests/vitals: SBP 130s - 150s overnight. Tmax 99.5.   - 23 CT a/p: No acute findings in the abdomen or pelvis.  No acute events overnight.  No chest pain or shortness of breath.  Moderate leg swelling (R>L)  No N/V/D.      Review of Systems   4 point ROS was obtained and negative except as noted in the Interval History.    MEDICATIONS:   allopurinol  300 mg Oral Daily    apixaban ANTICOAGULANT  5 mg Oral BID    levothyroxine  88 mcg Oral Daily    metoprolol tartrate  25 mg Oral BID    multivitamin w/minerals  1 tablet Oral Daily    mycophenolic acid  540 mg Oral BID IS    pantoprazole  40 mg Oral QAM AC    phosphorus tablet 250 mg  500 mg Oral BID    predniSONE  10 mg Oral Daily    sodium chloride (PF)  3 mL Intracatheter Q8H    sulfamethoxazole-trimethoprim  1 tablet Oral Daily    tacrolimus  2 mg Oral QPM    tacrolimus  3 mg Oral QAM    ursodiol  250 mg Oral BID    valGANciclovir  450 mg Oral Daily    vancomycin  125 mg Oral 4x Daily         Physical Exam   Temp  Av.8  F (37.1  C)  Min: 98.4  F (36.9  C)  Max: 99.5  F (37.5  C)      Pulse  Av.4  Min: 95  Max: 107 Resp  Av.3  Min: 16  Max: 18  SpO2  Av.5 %  Min: 92 %  Max: 98 %     BP (!) 146/96 (BP Location: Right arm)   Pulse 104   Temp 98.4  F (36.9  C) (Oral)   Resp 16   Ht 1.702 m (5' 7\")   Wt 99.7 kg (219 lb 12.8 oz)   SpO2 96%   BMI 34.43 kg/m      Admit Weight: 99.8 kg (220 lb)     GENERAL APPEARANCE: alert and no distress  HENT: mouth without ulcers or lesions  RESP: lungs clear to auscultation - no rales, rhonchi or wheezes  CV: " regular rhythm, normal rate, no rub, no murmur  EDEMA: moderate edema RUE and RLE, Mild edema LLE  ABDOMEN: soft, nondistended, nontender, bowel sounds normal  MS: right hand/wrist, left wrist, and right  to palpation  SKIN: no rash  PSYCH: mentation appears normal and affect normal/bright    Data   All labs reviewed by me.  CMP  Recent Labs   Lab 09/28/23  0605 09/27/23  1152 09/27/23  0924 09/25/23  0759    134*  --  139   POTASSIUM 3.8 4.6  --  4.0   CHLORIDE 101 99  --  104   CO2 21* 21*  --  22   ANIONGAP 13 14  --  13   * 175*  --  108*   BUN 18.7 18.8  --  18.8   CR 0.90 0.89  --  0.97   GFRESTIMATED >90 >90  --  86   NAZARIO 9.1 9.3  --  9.5   MAG 1.6* 1.6*  --  1.4*   PHOS 2.4*  --  2.3* 1.8*   PROTTOTAL 5.8* 6.5 6.5 6.5   ALBUMIN 3.1* 3.5 3.6 3.9   BILITOTAL 0.8 1.1 1.1 0.5   ALKPHOS 262* 317* 318* 267*   AST 37 56* 53* 59*   ALT 86* 121* 123* 158*     CBC  Recent Labs   Lab 09/28/23  0605 09/27/23  1152 09/25/23  0759   HGB 10.1* 11.4* 11.8*   WBC 8.8 11.2* 8.6  8.6   RBC 3.69* 4.16* 4.29*   HCT 33.9* 38.7* 40.7   MCV 92 93 95   MCH 27.4 27.4 27.5   MCHC 29.8* 29.5* 29.0*   RDW 20.9* 21.2* 21.7*    231 257     INRNo lab results found in last 7 days.  ABGNo lab results found in last 7 days.   Urine Studies  Recent Labs   Lab Test 09/27/23  1157 09/25/23  0759 09/20/23  1645 08/29/23  1023 08/04/23  0715 06/16/23  1220   COLOR Dark Yellow* Yellow Yellow Yellow Yellow Yellow   APPEARANCE Clear Clear Clear Clear Clear Cloudy*   URINEGLC 50* Negative 100* Negative Negative 150*   URINEBILI Small* Small* Negative Negative Negative Negative   URINEKETONE Negative Negative Negative Negative Negative Negative   SG 1.029 1.020 1.015 1.016 1.010 1.014   UBLD Negative Negative Negative Negative Negative Large*   URINEPH 6.0 7.0 7.0 7.0 6.5 5.5   PROTEIN 70* 30* Negative Negative Negative 70*   UROBILINOGEN  --  0.2 0.2  --  0.2  --    NITRITE Negative Negative Negative Negative Negative  Negative   LEUKEST Negative Negative Negative Negative Small* Large*   RBCU 1 None Seen  --   --  0-2 >182*   WBCU 7* None Seen  --   --  0-5 56*     No lab results found.  PTH  Recent Labs   Lab Test 09/20/23  1611 08/01/23  0924 05/19/23  0956   PTHI 24 45 67*     Iron Studies  Recent Labs   Lab Test 09/05/23  0702 08/07/23  0726 08/01/23  0924 11/22/22  0848   IRON 32* 28* 23* 68    244 231* 219*   IRONSAT 12* 11* 10* 31   HOLA 485* 440* 506* 429*       IMAGING:  All imaging studies reviewed by me.

## 2023-09-28 NOTE — PLAN OF CARE
"BP (!) 147/105   Pulse 95   Temp 98.8  F (37.1  C)   Resp 16   Ht 1.702 m (5' 7\")   Wt 99.7 kg (219 lb 12.8 oz)   SpO2 98%   BMI 34.43 kg/m      Assumed cares [id number]  Neuro: A/Ox4  Pain/Nausea: generalized extremity/joint pain - given PO Oxycodone and Tylenol x1; denies nausea  Cardiac: rate and rhythm regular  Resp: lung sounds clear, equal bilaterally  GI/: voiding small amounts of pavel/icteric urine; no BM this shift  Diet/Appetite: Regular diet, ate a piece of toast for breakfast and maybe 25% of lunch  Skin: extremities - karli, swollen, painful; generalized dryness, scab to left shin  Access: PIV - saline locked  Activity: Ax1 d/t pain  Procedures: Echo done today, US for DVT done today; Sepsis triggered, Lactic 2.3 - 1L LR bolus given, recheck Lactic at 3pm  Plan:   Will continue with plan of care and notify team of any changes.?    VIRI FERNANDES RN on 9/28/2023 at 2:40 PM      "

## 2023-09-29 ENCOUNTER — DOCUMENTATION ONLY (OUTPATIENT)
Dept: MEDSURG UNIT | Facility: CLINIC | Age: 68
End: 2023-09-29
Payer: MEDICARE

## 2023-09-29 PROBLEM — M06.4 INFLAMMATORY POLYARTHROPATHY (H): Status: ACTIVE | Noted: 2023-09-29

## 2023-09-29 LAB
ALBUMIN SERPL BCG-MCNC: 2.9 G/DL (ref 3.5–5.2)
ALP SERPL-CCNC: 278 U/L (ref 40–129)
ALT SERPL W P-5'-P-CCNC: 81 U/L (ref 0–70)
ANION GAP SERPL CALCULATED.3IONS-SCNC: 10 MMOL/L (ref 7–15)
AST SERPL W P-5'-P-CCNC: 39 U/L (ref 0–45)
BACTERIA UR CULT: NORMAL
BILIRUB DIRECT SERPL-MCNC: 0.36 MG/DL (ref 0–0.3)
BILIRUB SERPL-MCNC: 0.6 MG/DL
BUN SERPL-MCNC: 18.3 MG/DL (ref 8–23)
CALCIUM SERPL-MCNC: 9.2 MG/DL (ref 8.8–10.2)
CHLORIDE SERPL-SCNC: 104 MMOL/L (ref 98–107)
CREAT SERPL-MCNC: 0.87 MG/DL (ref 0.67–1.17)
CRP SERPL-MCNC: 252 MG/L
DEPRECATED HCO3 PLAS-SCNC: 22 MMOL/L (ref 22–29)
EGFRCR SERPLBLD CKD-EPI 2021: >90 ML/MIN/1.73M2
ERYTHROCYTE [SEDIMENTATION RATE] IN BLOOD BY WESTERGREN METHOD: 66 MM/HR (ref 0–20)
GLUCOSE SERPL-MCNC: 118 MG/DL (ref 70–99)
MAGNESIUM SERPL-MCNC: 1.7 MG/DL (ref 1.7–2.3)
PHOSPHATE SERPL-MCNC: 3.1 MG/DL (ref 2.5–4.5)
POTASSIUM SERPL-SCNC: 4.1 MMOL/L (ref 3.4–5.3)
PROT SERPL-MCNC: 5.9 G/DL (ref 6.4–8.3)
RHEUMATOID FACT SER NEPH-ACNC: 11 IU/ML
SODIUM SERPL-SCNC: 136 MMOL/L (ref 135–145)
TACROLIMUS BLD-MCNC: 13.8 UG/L (ref 5–15)
TME LAST DOSE: NORMAL H
TME LAST DOSE: NORMAL H

## 2023-09-29 PROCEDURE — 96366 THER/PROPH/DIAG IV INF ADDON: CPT

## 2023-09-29 PROCEDURE — 250N000011 HC RX IP 250 OP 636: Performed by: NURSE PRACTITIONER

## 2023-09-29 PROCEDURE — 86431 RHEUMATOID FACTOR QUANT: CPT | Performed by: INTERNAL MEDICINE

## 2023-09-29 PROCEDURE — G0378 HOSPITAL OBSERVATION PER HR: HCPCS

## 2023-09-29 PROCEDURE — 250N000012 HC RX MED GY IP 250 OP 636 PS 637: Performed by: NURSE PRACTITIONER

## 2023-09-29 PROCEDURE — 86140 C-REACTIVE PROTEIN: CPT | Performed by: INTERNAL MEDICINE

## 2023-09-29 PROCEDURE — 36415 COLL VENOUS BLD VENIPUNCTURE: CPT | Performed by: INTERNAL MEDICINE

## 2023-09-29 PROCEDURE — 84100 ASSAY OF PHOSPHORUS: CPT | Performed by: NURSE PRACTITIONER

## 2023-09-29 PROCEDURE — 80048 BASIC METABOLIC PNL TOTAL CA: CPT | Performed by: NURSE PRACTITIONER

## 2023-09-29 PROCEDURE — 99232 SBSQ HOSP IP/OBS MODERATE 35: CPT | Mod: GC | Performed by: INTERNAL MEDICINE

## 2023-09-29 PROCEDURE — 80197 ASSAY OF TACROLIMUS: CPT | Performed by: NURSE PRACTITIONER

## 2023-09-29 PROCEDURE — 250N000009 HC RX 250: Performed by: NURSE PRACTITIONER

## 2023-09-29 PROCEDURE — 83735 ASSAY OF MAGNESIUM: CPT | Performed by: NURSE PRACTITIONER

## 2023-09-29 PROCEDURE — 250N000013 HC RX MED GY IP 250 OP 250 PS 637: Performed by: NURSE PRACTITIONER

## 2023-09-29 PROCEDURE — 85652 RBC SED RATE AUTOMATED: CPT | Performed by: INTERNAL MEDICINE

## 2023-09-29 PROCEDURE — 99233 SBSQ HOSP IP/OBS HIGH 50: CPT

## 2023-09-29 PROCEDURE — 99238 HOSP IP/OBS DSCHRG MGMT 30/<: CPT | Mod: FS | Performed by: NURSE PRACTITIONER

## 2023-09-29 PROCEDURE — 82248 BILIRUBIN DIRECT: CPT | Performed by: NURSE PRACTITIONER

## 2023-09-29 RX ORDER — MAGNESIUM SULFATE HEPTAHYDRATE 40 MG/ML
2 INJECTION, SOLUTION INTRAVENOUS ONCE
Status: COMPLETED | OUTPATIENT
Start: 2023-09-29 | End: 2023-09-29

## 2023-09-29 RX ADMIN — URSODIOL 250 MG: 250 TABLET, FILM COATED ORAL at 08:23

## 2023-09-29 RX ADMIN — METOPROLOL TARTRATE 25 MG: 25 TABLET, FILM COATED ORAL at 08:23

## 2023-09-29 RX ADMIN — OXYCODONE HYDROCHLORIDE 2.5 MG: 5 TABLET ORAL at 00:35

## 2023-09-29 RX ADMIN — PREDNISONE 10 MG: 10 TABLET ORAL at 08:24

## 2023-09-29 RX ADMIN — VANCOMYCIN HYDROCHLORIDE 125 MG: 125 CAPSULE ORAL at 12:06

## 2023-09-29 RX ADMIN — SULFAMETHOXAZOLE AND TRIMETHOPRIM 1 TABLET: 400; 80 TABLET ORAL at 08:24

## 2023-09-29 RX ADMIN — VALGANCICLOVIR 450 MG: 450 TABLET, FILM COATED ORAL at 08:29

## 2023-09-29 RX ADMIN — VANCOMYCIN HYDROCHLORIDE 125 MG: 125 CAPSULE ORAL at 20:19

## 2023-09-29 RX ADMIN — MYCOPHENOLIC ACID 540 MG: 360 TABLET, DELAYED RELEASE ORAL at 08:29

## 2023-09-29 RX ADMIN — ACETAMINOPHEN 325 MG: 325 TABLET, FILM COATED ORAL at 00:35

## 2023-09-29 RX ADMIN — APIXABAN 5 MG: 5 TABLET, FILM COATED ORAL at 20:19

## 2023-09-29 RX ADMIN — SODIUM PHOSPHATE, DIBASIC, ANHYDROUS, POTASSIUM PHOSPHATE, MONOBASIC, AND SODIUM PHOSPHATE, MONOBASIC, MONOHYDRATE 500 MG: 852; 155; 130 TABLET, COATED ORAL at 20:18

## 2023-09-29 RX ADMIN — ANAKINRA 100 MG: 100 INJECTION, SOLUTION SUBCUTANEOUS at 14:36

## 2023-09-29 RX ADMIN — VANCOMYCIN HYDROCHLORIDE 125 MG: 125 CAPSULE ORAL at 08:24

## 2023-09-29 RX ADMIN — Medication 1 TABLET: at 12:06

## 2023-09-29 RX ADMIN — PANTOPRAZOLE SODIUM 40 MG: 40 TABLET, DELAYED RELEASE ORAL at 08:24

## 2023-09-29 RX ADMIN — TACROLIMUS 1.5 MG: 1 CAPSULE ORAL at 08:29

## 2023-09-29 RX ADMIN — ALLOPURINOL 300 MG: 100 TABLET ORAL at 08:23

## 2023-09-29 RX ADMIN — TACROLIMUS 1.5 MG: 1 CAPSULE ORAL at 18:09

## 2023-09-29 RX ADMIN — MYCOPHENOLIC ACID 540 MG: 360 TABLET, DELAYED RELEASE ORAL at 18:09

## 2023-09-29 RX ADMIN — MAGNESIUM SULFATE IN WATER 2 G: 40 INJECTION, SOLUTION INTRAVENOUS at 08:32

## 2023-09-29 RX ADMIN — APIXABAN 5 MG: 5 TABLET, FILM COATED ORAL at 08:24

## 2023-09-29 RX ADMIN — METOPROLOL TARTRATE 25 MG: 25 TABLET, FILM COATED ORAL at 20:18

## 2023-09-29 RX ADMIN — VANCOMYCIN HYDROCHLORIDE 125 MG: 125 CAPSULE ORAL at 15:40

## 2023-09-29 RX ADMIN — SODIUM PHOSPHATE, DIBASIC, ANHYDROUS, POTASSIUM PHOSPHATE, MONOBASIC, AND SODIUM PHOSPHATE, MONOBASIC, MONOHYDRATE 500 MG: 852; 155; 130 TABLET, COATED ORAL at 08:24

## 2023-09-29 RX ADMIN — LEVOTHYROXINE SODIUM 88 MCG: 88 TABLET ORAL at 08:23

## 2023-09-29 RX ADMIN — URSODIOL 250 MG: 250 TABLET, FILM COATED ORAL at 20:18

## 2023-09-29 ASSESSMENT — ACTIVITIES OF DAILY LIVING (ADL)
ADLS_ACUITY_SCORE: 22
ADLS_ACUITY_SCORE: 22
DEPENDENT_IADLS:: INDEPENDENT
ADLS_ACUITY_SCORE: 22

## 2023-09-29 NOTE — PROGRESS NOTES
Transplant Surgery  Inpatient Daily Progress Note  09/29/2023    Assessment & Plan: Damion Quinones is a 68 year old male with a past medical history of cirrhosis (alcohol + hemochromatosis), ESKD (IgA nephropathy + ANCA vasculitis), PAF, obesity, HTN, pre-diabetes, rheumatoid and psoriatic arthritis, and CAD. He is now s/p SLK 6/6/2023 complicated by DGF, AF RVR, shock, gout flare, right internal jugular clot, and malnutrition. He presented to the ED 9/27/2023 with complaints of abdominal pain and generalized swelling.     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 Alk phos, transaminases elevated. Liver US 9/27 with patent doppler.   - Continue Ursodiol     Kidney transplant: Cr 0.9, below baseline Cr ~1-1.2. Ureteral stent removed 8/1/23.     - Renal US: Normal kidney, patent vasculature         Immunosuppressed status secondary to medications:   Maintenance:    - Myfortic 540 mg BID   - PTA Tacrolimus dose 3 mg QAM / 2 mg QPM. Goal level 8-10. Level supratherapeutic, 13.8, likely r/t diarrhea. Held dose 9/28, restarted this am 1.5 mg BID, and repeat level daily.    - Prednisone 10 mg daily d/t gout flare.      Hematology:   Iron deficiency anemia: Follows with anemia clinic outpatient. Will need iron supplementation IV once infection is resolved. Hgb 10.     Cardiorespiratory:   CAD; PAF: PTA statin, beta blocker, apixaban.      GI/Nutrition: Regular diet  Umbilical hernia: Plan for repair once prednisone dose is 5 mg daily or lower.      Endocrine:   Hypothyroidism: PTA levothyroxine.      Fluid/Electrolytes:   Hypomagnesemia: Hold PTA Mag-Ox with diarrhea. Replace 2 grams IV today.   Bilateral upper and lower extremity edema: Reports weight gain but weight appears stable. Pro . TTE WNL. Bilateral upper and lower extremity US negative for DVT. May be r/t gout. :    - Rheum consulted as below     : No issues.      Rheum:   Gout: Follow  with outside rheumatologist and is currently on prednisone 10 mg daily, allopurinol.     - PRN Tylenol and oxycodone for pain.    - Rheumatology consulted, recs:.     Anakinra 100 mg subcutanoeous q 24 hr x 3 doses (may extend to 5 days)  - Continue Prednisone 10 mg daily  - RF  anti-CCP pending   CRP and ESR elevated, 252 and 66 respectively    Infectious disease:   C diff diarrhea: C diff positive 9/25. Continue PO Vanco.       Infectious workup:    - 9/26 Enteric panel negative   - 9/25 C diff positive   - 9/27 BCx NGTD   - 9/27 UCx in process   - 9/27 CMV not detected   - 9/27 EBV not detected   - 9/27 CT A/P with no acute findings   - 9/27 CXR clear     Prophylaxis: DVT (Apixaban), fall, GI (PPI), PJP (Bactrim), viral (Valcyte x 12 weeks post-transplant)     Disposition: 7A     GEORGE/Fellow/Resident Provider: Mayra Espinosa, NP  #[id number]    Faculty: Radha Sykes MD    __________________________________________________________________  Transplant History:    6/6/2023 (Liver), 6/6/2023 (Kidney), Postoperative day: 115 (Liver), 115 (Kidney)     Interval History: History is obtained from the patient, spouse  Overnight events: No acute events overnight. Continues to report bilateral upper extremity wrist/hand pain. Diarrhea resolved, but continued loose stool. Up within room.     ROS:   A 10-point review of systems was negative except as noted above.    Curent Meds:   allopurinol  300 mg Oral Daily    anakinra  100 mg Subcutaneous Daily    apixaban ANTICOAGULANT  5 mg Oral BID    levothyroxine  88 mcg Oral Daily    metoprolol tartrate  25 mg Oral BID    multivitamin w/minerals  1 tablet Oral Daily    mycophenolic acid  540 mg Oral BID IS    pantoprazole  40 mg Oral QAM AC    phosphorus tablet 250 mg  500 mg Oral BID    predniSONE  10 mg Oral Daily    sodium chloride (PF)  3 mL Intracatheter Q8H    sulfamethoxazole-trimethoprim  1 tablet Oral Daily    tacrolimus  1.5 mg Oral BID IS    ursodiol  250 mg Oral BID  "   valGANciclovir  450 mg Oral Daily    vancomycin  125 mg Oral 4x Daily       Physical Exam:     Admit Weight: 99.8 kg (220 lb)    Current Vitals:   /77 (BP Location: Right arm)   Pulse 85   Temp 98.3  F (36.8  C) (Oral)   Resp 16   Ht 1.702 m (5' 7\")   Wt 99.7 kg (219 lb 12.8 oz)   SpO2 97%   BMI 34.43 kg/m      Vital sign ranges:    Temp:  [98.2  F (36.8  C)-98.9  F (37.2  C)] 98.3  F (36.8  C)  Pulse:  [] 85  Resp:  [16] 16  BP: (107-168)/() 135/77  SpO2:  [95 %-98 %] 97 %    General Appearance: in no apparent distress.   Skin: normal, warm, dry, No rashes, induration, or jaundice  Heart: perfused  Lungs: unlabored on RA  Abdomen: soft, non-distended.  :voiding  Extremities: edema: BLE +2 edema. R>L with decreased ROM to hands due to pain and edema. BUE scabbing noted on exam.   Neurologic: A&Ox4. Tremor absent.     Frailty Scores          12/27/2022 11/22/2022   Frailty Scores   Final Score Frail Frail   Final Score Number 3 4       Data:   CMP  Recent Labs   Lab 09/29/23  0520 09/28/23  0605 09/27/23  1152    135 134*   POTASSIUM 4.1 3.8 4.6   CHLORIDE 104 101 99   CO2 22 21* 21*   * 128* 175*   BUN 18.3 18.7 18.8   CR 0.87 0.90 0.89   GFRESTIMATED >90 >90 >90   NAZARIO 9.2 9.1 9.3   MAG 1.7 1.6* 1.6*   PHOS 3.1 2.4*  --    ALBUMIN  --  3.1* 3.5   BILITOTAL  --  0.8 1.1   ALKPHOS  --  262* 317*   AST  --  37 56*   ALT  --  86* 121*     CBC  Recent Labs   Lab 09/28/23  0605 09/27/23  1152   HGB 10.1* 11.4*   WBC 8.8 11.2*    231     "

## 2023-09-29 NOTE — PROGRESS NOTES
Prior Authorization Approval    Medication: KINERET 100 MG/0.67ML SC SOSY  PA Initiated: 9/29/2023  PA Type: Clinical  Approved Qty: 5 syringes, one fill    Insurance: [employer] - Phone [phone] Fax [phone]  [facility] Key / Reference #: [id number] / [id number]   Authorization Effective Dates: 9/29/2023 - 10/29/2023    Expected CoPay: $ 0.00  CoPay Card Eligible: Yes    CoPay Card Info  Member ID: [id number]  BIN: [id number]  PCN: [id number]  Group: [id number]  Copay card Annual Amount: $ 13,000    Filling Pharmacy: [facility] - [place], [place] - [facility] [address]  Comment: This can only be filled at [facility] per patient's plan.      Cindy Salter  [facility] Liaison  Ph: [phone] Pager: [phone]   Securely message with the Vocera Web Console (learn more here)

## 2023-09-29 NOTE — DISCHARGE SUMMARY
[facility]    Discharge Summary  Solid Organ Transplant    Date of Admission:  9/27/2023  Date of Discharge:  9/30/2023  Discharging Provider: Katarina Alvarado PA-C/ Dr. Sykes    Discharge Diagnoses   Principal Problem:    Swelling of limb  Active Problems:    Status post liver transplantation (H)    Inflammatory polyarthropathy (H)    Immunosuppressed status    Unresulted Labs Ordered in the Past 30 Days of this Admission       Date and Time Order Name Status Description    9/29/2023 11:30 PM Tacrolimus by Tandem Mass Spectrometry In process     9/27/2023 11:28 AM Blood Culture Peripheral Blood Preliminary     9/27/2023 11:28 AM Blood Culture Peripheral Blood Preliminary         These results will be followed up by transplant team    Discharge Disposition   Discharged to home  Condition at discharge: Improving    Hospital Course   Damion Quinones was admitted on 9/27/2023.  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 Alk phos, transaminases elevated. Liver US 9/27 with patent doppler.   - Continue Ursodiol     Kidney transplant: Cr 0.9, below baseline Cr ~1-1.2. Ureteral stent removed 8/1/23.     - Renal US: Normal kidney, patent vasculature     Immunosuppressed status secondary to medications:   Maintenance:    - Myfortic 540 mg BID   - PTA Tacrolimus dose 3 mg QAM / 2 mg QPM. Goal level 8-10.   Level supratherapeutic, likely r/t diarrhea. Will discharge on 1.5 mg BID.   - Prednisone 10 mg daily d/t gout flare.      Hematology:   Iron deficiency anemia: Follows with anemia clinic outpatient. Will need iron supplementation IV once infection is resolved. Hgb 10.     Cardiorespiratory:   CAD; PAF: PTA statin, beta blocker, apixaban.    HTN: -150s. No changes per Tx neph. Metoprolol 25 mg PO BID    GI/Nutrition: Regular  diet  Umbilical hernia: Plan for repair once prednisone dose is 5 mg daily or lower.      Endocrine:   Hypothyroidism: PTA levothyroxine.      Fluid/Electrolytes:   Hypomagnesemia: Hold PTA Mag-Ox with diarrhea.    Bilateral upper and lower extremity edema: Reports weight gain but weight appears stable. Pro . TTE WNL. Bilateral upper and lower extremity US negative for DVT. May be r/t gout. :    - Rheum consulted as below     Rheum:   Gout: Follow with outside rheumatologist and is currently on prednisone 10 mg daily, allopurinol.     - PRN Tylenol and oxycodone for pain.    - Rheumatology consulted, recs:.     Anakinra 100 mg subcutaneous q 24 hr x 3 doses (may extend to 5 days)  - Continue Prednisone 10 mg daily  - RF 11 (normal <12), anti-CCP negative, 0.5.   CRP and ESR elevated, 252 and 66 respectively     Infectious disease:   C diff diarrhea: C diff positive 9/25. Continue PO Vanco     Infectious workup:    - 9/26 Enteric panel negative   - 9/25 C diff positive   - 9/27 BCx NGTD   - 9/27 UCx in process   - 9/27 CMV not detected   - 9/27 EBV not detected   - 9/27 CT A/P with no acute findings   - 9/27 CXR clear    Consultations This Hospital Stay   RHEUMATOLOGY IP CONSULT  NEPHROLOGY KIDNEY/PANCREAS TRANSPLANT ADULT IP CONSULT  CARE MANAGEMENT / SOCIAL WORK IP CONSULT    Code Status   Full Code    Time Spent on this Encounter   IKatarina PA-C, personally saw the patient today and spent less than or equal to 30 minutes discharging this patient.       ______________________________________________________________________    Physical Exam   Temp: 98.4  F (36.9  C) Temp src: Oral BP: 132/86 Pulse: 95   Resp: 18 SpO2: 99 % O2 Device: None (Room air)    Vitals:    09/27/23 0955 09/27/23 1219 09/30/23 0943   Weight: 99.8 kg (220 lb) 99.7 kg (219 lb 12.8 oz) 100 kg (220 lb 8 oz)     Vital Signs with Ranges  Temp:  [98.1  F (36.7  C)-99  F (37.2  C)] 98.4  F (36.9  C)  Pulse:  [] 95  Resp:   [16-18] 18  BP: (132-163)/() 132/86  SpO2:  [93 %-99 %] 99 %  I/O last 3 completed shifts:  In: 960 [P.O.:960]  Out: 1850 [Urine:1850]    Constitutional: Awake, alert, cooperative, no apparent distress, and appears stated age.  Eyes: Lids and lashes normal, pupils equal, round, extra ocular muscles intact, sclera clear, conjunctiva normal.  ENT: Normocephalic, without obvious abnormality, atraumatic  Respiratory: Nonlabored resps on RA  Cardiovascular: Perfused  GI: Non-tender  Genitourinary:  Voiding  Skin: No bruising or bleeding, normal skin color, texture  Musculoskeletal: Joint swelling improving.  Neurologic: Awake, alert, oriented to name, place and time.   Neuropsychiatric: Calm, normal eye contact, alert, normal affect, oriented to self, place, time and situation, memory for past and recent events intact and thought process normal.    Primary Care Physician   Gary Serrano    Discharge Orders      Reason for your hospital stay    Patient was admitted due to a gout flare, symptoms were improving with anakinra.     Activity    Your activity upon discharge: activity as tolerated     Adult [facility] Follow-up and recommended labs and tests    Resume transplant lab monitoring: CBC, BMP, mag, phos, hepatic panel, and tacrolimus level.    Follow up with home rheumatologist at next available.    Resume all other follow up as arranged.    Appointments on [place] and/or [facility] (with [facility] or [facility] provider or service). Call [phone] if you haven't heard regarding these appointments within 7 days of discharge.     When to contact your care team    Call your transplant coordinator at [phone] if you have any of the following: sustained temperature greater than 100.4 or isolated fever spike to 101.5, increased pain over graft  or difficulty obtaining or taking your transplant medications.    If it is outside of office hours, please call the hospital switchboard at [phone] and ask to  have the transplant surgery fellow paged for urgent medical questions.     Diet    Follow this diet upon discharge: Regular Diet       Discharge Medications   Current Discharge Medication List        START taking these medications    Details   anakinra (KINERET) 100 MG/0.67ML SOSY injection Inject 0.67 mLs (100 mg) Subcutaneous daily for 5 days  Qty: 3.35 mL, Refills: 0    Associated Diagnoses: Tophaceous gout; Inflammatory polyarthropathy (H)      oxyCODONE (ROXICODONE) 5 MG tablet Take 0.5-1 tablets (2.5-5 mg) by mouth every 6 hours as needed for severe pain or moderate pain  Qty: 5 tablet, Refills: 0    Associated Diagnoses: Inflammatory polyarthropathy (H)      tacrolimus (GENERIC EQUIVALENT) 0.5 MG capsule Take 1 capsule (0.5 mg) by mouth 2 times daily  Qty: 60 capsule, Refills: 11    Associated Diagnoses: Kidney replaced by transplant; Liver transplant recipient (H)           CONTINUE these medications which have CHANGED    Details   tacrolimus (GENERIC EQUIVALENT) 1 MG capsule Take 1.5 mg by mouth twice daily  Qty: 450 capsule, Refills: 3    Comments: Profile  Associated Diagnoses: Kidney replaced by transplant; Liver transplant recipient (H)           CONTINUE these medications which have NOT CHANGED    Details   allopurinol (ZYLOPRIM) 100 MG tablet Take 300 mg by mouth daily  Qty: 180 tablet, Refills: 3    Associated Diagnoses: Liver transplant recipient (H)      apixaban ANTICOAGULANT (ELIQUIS ANTICOAGULANT) 5 MG tablet Take 1 tablet (5 mg) by mouth 2 times daily  Qty: 180 tablet, Refills: 3    Associated Diagnoses: Permanent atrial fibrillation with rapid ventricular response (H)      atorvastatin (LIPITOR) 20 MG tablet Take 1 tablet (20 mg) by mouth every evening  Qty: 30 tablet, Refills: 0    Associated Diagnoses: Liver transplant recipient (H)      levothyroxine (SYNTHROID/LEVOTHROID) 88 MCG tablet Take 1 tablet (88 mcg) by mouth daily  Qty: 30 tablet, Refills: 3    Associated Diagnoses:  Hypothyroidism, unspecified type      magnesium oxide (MAG-OX) 400 MG tablet Take 2 tablets (800 mg) by mouth 2 times daily  Qty: 60 tablet, Refills: 0    Associated Diagnoses: Liver transplant recipient (H)      metoprolol tartrate (LOPRESSOR) 25 MG tablet Take 1 tablet (25 mg) by mouth 2 times daily  Qty: 180 tablet, Refills: 3    Associated Diagnoses: Permanent atrial fibrillation with rapid ventricular response (H)      multivitamin w/minerals (THERA-VIT-M) tablet Take 1 tablet by mouth daily  Qty: 30 tablet, Refills: 0    Associated Diagnoses: Liver transplant recipient (H)      MYFORTIC (BRAND) 180 MG EC tablet Take 3 tablets (540 mg) by mouth 2 times daily  Qty: 540 tablet, Refills: 3    Comments: TXP DT 6/6/2023 (Liver), 6/6/2023 (Kidney) TXP Dischg DT 6/25/2023 DX Kidney replaced by transplant Z94.0 TX Center [facility] ([place], [place])  Associated Diagnoses: Liver transplant recipient (H)      pantoprazole (PROTONIX) 40 MG EC tablet Take 1 tablet (40 mg) by mouth every morning (before breakfast)  Qty: 30 tablet, Refills: 0    Associated Diagnoses: Liver transplant recipient (H)      phosphorus tablet 250 mg (PHOSPHA 250 NEUTRAL) 250 MG per tablet Take 2 tablets (500 mg) by mouth 2 times daily  Qty: 120 tablet, Refills: 1    Associated Diagnoses: Hypophosphatemia      predniSONE (DELTASONE) 5 MG tablet Take 2 tablets (10 mg) by mouth daily  Qty: 135 tablet, Refills: 3    Associated Diagnoses: Kidney replaced by transplant      simethicone (MYLICON) 125 MG chewable tablet Take 125 mg by mouth 4 times daily as needed for intestinal gas      sulfamethoxazole-trimethoprim (BACTRIM) 400-80 MG tablet Take 1 tablet by mouth daily  Qty: 90 tablet, Refills: 3    Associated Diagnoses: Liver transplant recipient (H)      ursodiol (ACTIGALL) 250 MG tablet Take 1 tablet (250 mg) by mouth 2 times daily  Qty: 180 tablet, Refills: 3    Associated Diagnoses: Liver transplant recipient  (H)      vancomycin (VANCOCIN) 125 MG capsule Take 1 capsule (125 mg) by mouth every 6 hours for 14 days, THEN 1 capsule (125 mg) every 8 hours for 7 days, THEN 1 capsule (125 mg) every 12 hours for 14 days, THEN 1 capsule (125 mg) daily for 7 days.  Qty: 112 capsule, Refills: 0    Associated Diagnoses: Liver transplant recipient (H); Kidney replaced by transplant; Clostridium difficile infection      Alcohol Swabs PADS Use to swab the area of the injection or quyen as directed Per insurance coverage  Qty: 100 each, Refills: 0    Associated Diagnoses: Liver transplant recipient (H)      blood glucose (NO BRAND SPECIFIED) lancets standard To use to test glucose level in the blood Use to test blood sugar  4  times daily as directed. To accompany glucose monitor brands per insurance coverage.  Qty: 100 each, Refills: 0    Associated Diagnoses: Liver transplant recipient (H)      !! blood glucose (NO BRAND SPECIFIED) test strip Use to test blood sugar 3 times daily or as directed.  Qty: 30 strip, Refills: 3    Associated Diagnoses: Liver replaced by transplant (H); Kidney replaced by transplant      !! blood glucose (NO BRAND SPECIFIED) test strip To use to test glucose level in the blood Use to test blood sugar  4 times daily as directed. To accompany glucose monitor brands per insurance coverage.  Qty: 4 strip, Refills: 1    Associated Diagnoses: Liver transplant recipient (H)      blood glucose monitoring (NO BRAND SPECIFIED) meter device kit Use as directed Per insurance coverage  Qty: 1 kit, Refills: 0    Associated Diagnoses: Liver transplant recipient (H)      Sharps Container MISC Use as directed to dispose of needles, lancets and other sharps  Qty: 1 each, Refills: 0    Associated Diagnoses: Liver transplant recipient (H)       !! - Potential duplicate medications found. Please discuss with provider.        Allergies   No Known Allergies

## 2023-09-29 NOTE — CONSULTS
Care Management Initial Consult    General Information  Assessment completed with: Spouse or significant other, Patient,  (Casey and Apoorva)  Type of CM/SW Visit: Initial Assessment    Primary Care Provider verified and updated as needed: Yes   Readmission within the last 30 days: no previous admission in last 30 days      Reason for Consult: discharge planning  Advance Care Planning:            Communication Assessment  Patient's communication style: spoken language (English or Bilingual)    Hearing Difficulty or Deaf: no   Wear Glasses or Blind: yes    Cognitive  Cognitive/Neuro/Behavioral: WDL                      Living Environment:   People in home: spouse     Current living Arrangements: house      Able to return to prior arrangements:         Family/Social Support:  Care provided by: self, spouse/significant other  Provides care for: no one  Marital Status:   Wife   (Apoorva)       Description of Support System: Supportive, Involved    Support Assessment: Adequate family and caregiver support, Adequate social supports    Current Resources:   Patient receiving home care services: No     Community Resources: None  Equipment currently used at home: grab bar, tub/shower  Supplies currently used at home:      Employment/Financial:  Employment Status: retired        Financial Concerns: No concerns identified   Referral to Financial Worker: No       Does the patient's insurance plan have a 3 day qualifying hospital stay waiver?  No    Lifestyle & Psychosocial Needs:  Social Determinants of Health     Food Insecurity: Not on file   Depression: Not at risk (7/10/2023)    PHQ-2     PHQ-2 Score: 0   Housing Stability: Not on file   Tobacco Use: Low Risk  (9/18/2023)    Patient History     Smoking Tobacco Use: Never     Smokeless Tobacco Use: Never     Passive Exposure: Never   Financial Resource Strain: Not on file   Alcohol Use: Not on file   Transportation Needs: Not on file   Physical Activity: Not on file    Interpersonal Safety: Not on file   Stress: Not on file   Social Connections: Not on file       Functional Status:  Prior to admission patient needed assistance:   Dependent ADLs:: Independent  Dependent IADLs:: Independent  Assesssment of Functional Status: At functional baseline    Mental Health Status:  Mental Health Status: No Current Concerns       Chemical Dependency Status:  Chemical Dependency Status: Past Concern     Casey's last use of alcohol was New Year's Marcela 2020, one glass of champagne.  He was previously sober since 2016. Casey reports he was drinking twelve beers per day prior to discontinuing all consumption in 2016.  This was his pattern of drinking for a couple of years.  Prior to this reports consuming an average of eighteen beers per week.          Values/Beliefs:  Spiritual, Cultural Beliefs, Caodaism Practices, Values that affect care:               Additional Information:  I met with Casey and his spouse, Apoorva at bedside. They reported that things were going well since discharge form the TCU in late July. They felt like he's made good progress until recently. They reported that they've been having trouble with the prior auth for Anakinra. This writer contacted Cindy Gaetano to inquire if this is something we are able to assist with during their hospitalization. Cindy indicated she will submit PA. Casey and Apoorva did not voiced any other questions or concerns.     No RNCC/SW needs identified at this time.     MOUNIKA Lyles, [facility]  Liver Transplant   [facility]  Phone: [phone]  Pager: [phone]

## 2023-09-29 NOTE — PROGRESS NOTES
Observation Goals:     1. Patient will be able to hydrate adequately (at least 2L per day PO). - Progressing, encouraging oral intake, pt had 100% of breakfast and 960mL water so far today.  2. Patient will be seen by Rheumatology with appropriate treatment plan initiated. - Met, anakinra scheduled, pt being treated with 3 doses before discharge per rheumatology note (can extend to 5 days), this afternoon will be dose 2/3.

## 2023-09-29 NOTE — PROGRESS NOTES
Observation Goals:     1. Patient will be able to hydrate adequately (at least 2L per day PO). - Progressing, encouraging oral intake  2. Patient will be seen by Rheumatology with appropriate treatment plan initiated. - Met

## 2023-09-29 NOTE — PLAN OF CARE
"BP (!) 151/94   Pulse 100   Temp 98.3  F (36.8  C) (Oral)   Resp 16   Ht 1.702 m (5' 7\")   Wt 99.7 kg (219 lb 12.8 oz)   SpO2 99%   BMI 34.43 kg/m      Shift: 5915-0290  Isolation Status: enteric  and contact   VS: systolic 's on RA, afebrile  Neuro: Aox4  Behaviors: none  BG: none  Labs: pending AM  Respiratory: RA  Cardiac: WNL  Pain/Nausea: some discomfort to the right hand denied wanting any intervention and denied nausea   PRN: none  Diet: Regular  IV Access: PIV SL  Infusion(s): none  Lines/Drains: none  GI/: LBM today and has been voiding   Skin: intact has swelling +3 right hand and +1 edema to the left hand has been elevating his legs as needed.   Mobility: UAL with walker in the room  Plan: will receive last dose Anakinra tomorrow and possible discharge afterwards.     Obervation Goals   1. Improvement in joint pain/mobility.  Met  2. Patient will be evaluated by Rheumatology with appropriate treatment plan initiated. Met           "

## 2023-09-29 NOTE — PROGRESS NOTES
Observation Goals:     1. Patient will be able to hydrate adequately (at least 2L per day PO). - Not met.  Encouraging oral intake.  2. Patient will be seen by Rheumatology with appropriate treatment plan initiated. - Met.

## 2023-09-29 NOTE — PROGRESS NOTES
[facility]   Transplant Nephrology Progress Note  Date of Admission:  9/27/2023  Today's Date: 09/29/2023    Recommendations:  - No new recommendations at this time.    Assessment & Plan   # DDKT (SLK): Stable   - Baseline Creatinine: ~ 1-1.2   - Proteinuria: Minimal (0.2-0.5 grams)   - Date DSA Last Checked: Aug/2023      Latest DSA: No but had DSA to Cq9 w/  at txp   - BK Viremia: No   - Kidney Tx Biopsy: Jun 20, 2023; Result: negative for rejection. Focal ATN   - Transplant Ureteral Stent: Removed      # Liver Tx (SLK):  Transaminases Trend down. Total bilirubin normal. Followed by transplant surgery.     # Immunosuppression: Tacrolimus immediate release (goal 8-10), Mycophenolic acid (dose 540 mg every 12 hours), and Prednisone (dose 10 mg daily)   - Induction with Recent Transplant:   rATG total 2mg/kg, stopped 2/2 sepsis   - Continue with intensive monitoring of immunosuppression for efficacy and toxicity.   - 09/29/23  Tacrolimus level: 13.8 (~ 21 hour trough)   - Changes: Not at this time. Primary team held a dose of tacrolimus yesterday and reduced the dose.    # Infection Prophylaxis:   - PJP: Sulfa/TMP (Bactrim)  - CMV: None, prophylaxis completed    # Hypertension: Borderline control;  Goal BP: < 140/90   - Volume status: Euvolemic  EDW: 215lbs   - Changes: Not at this time. Continue metoprolol 25 mg PO BID.    # Anemia in Chronic Renal Disease: Hgb: Trend down      FEDERICO: Yes, Aranesp for Hgb < 10    - Iron studies: Low iron saturation, start iron supplementation when infection resolved.    # Mineral Bone Disorder:   - Secondary renal hyperparathyroidism; PTH level: Normal (15-65 pg/ml)        On treatment: None  - Vitamin D; level: Normal          On supplement: No  - Calcium; level: Normal           On supplement: No  - Phosphorus; level: slightly Normal         On supplement: Yes, phos tablet 500 mg PO BID     # Electrolytes:   - Potassium; level:  Normal         On supplement: No  - Magnesium; level: Normal          On supplement: No  - Bicarbonate; level:  Normal        On supplement: No    #Clostridium difficile  # ID Workup: c/o abdominal cramping and ongoing diarrhea.  Liver enzymes elevated.  Rule out CMV and EBV viremia.  On vancomycin 125 mg four times daily for C. Diff.   - 9/27/23 CT a/p: No acute findings in the abdomen or pelvis.    - 9/28/23 Transplant Renal Ultrasound: Normal grayscale evaluation of the transplant kidney      9/27/2023   Urine Culture 50,000-100,000 CFU/mL Mixture of urogenital yuri     9/27/2023 CMV PCR Not detected    9/27/2023  EBV DNA PCR Quantitative Whole Blood Not detected    9/27/2023  Blood Culture Peripheral Blood no growth to date    9/27/2023  Blood Culture Peripheral Blood no growth to date    9/26/2023   Enteric Bacteria and Virus Panel negative    9/25/2023  C. Diff antigen and toxin A/B Positive       # Gout: presented 09/27/23 with swelling and pain in his right hand, right arm, and right foot/leg and are similar to gout flares he has had in the past.     -Continue allopurinol 300 mg daily.               -Follows with rheumatology at [facility], Dr. Rucker.    - 9/28/23 No DVT in upper or lower extremities on ultrasound.   - Anakinra 100 mg subcutanoeous 9/28/23 - present    # Paroxysmal A-Fib:    -Stable on metoprolol and anticoagulated on apixaban     # ANCA Vasculitis: S/p Rituximab x2              - U/A negative on 8/4. 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    Discussed with Dr. Marrero.    JADE Tiwari CNP   Pager: [phone]    Interval History   Anakinra ordered yesterday for 3-5 doses.  Mr. Miranda creatinine is 0.87 ( 0520); Stable    I/O last 3 completed shifts:  In: 963 [P.O.:960; I.V.:3]  Out: 1750 [Urine:1750]   Other significant labs/tests/vitals: SBP 130s - 140s overnight. Afebrile.  Tacrolimus level 14.7 yesterday.  Primary team decreasing dose to reach goal.  EBV and CMV negative. Urine culture grew 50,000-100,000 CFU/mL Mixture of urogenital yuri.   - 23 Transplant Renal Ultrasound: Normal grayscale evaluation of the transplant kidney    - 23 No DVT in upper or lower extremities on ultrasound.  No acute events overnight.  No chest pain or shortness of breath.  Trace leg swelling.  Swelling primarily in hands and feet.  No N/V/D.      Review of Systems   4 point ROS was obtained and negative except as noted in the Interval History.    MEDICATIONS:   allopurinol  300 mg Oral Daily    anakinra  100 mg Subcutaneous Daily    apixaban ANTICOAGULANT  5 mg Oral BID    levothyroxine  88 mcg Oral Daily    magnesium sulfate  2 g Intravenous Once    metoprolol tartrate  25 mg Oral BID    multivitamin w/minerals  1 tablet Oral Daily    mycophenolic acid  540 mg Oral BID IS    pantoprazole  40 mg Oral QAM AC    phosphorus tablet 250 mg  500 mg Oral BID    predniSONE  10 mg Oral Daily    sodium chloride (PF)  3 mL Intracatheter Q8H    sulfamethoxazole-trimethoprim  1 tablet Oral Daily    tacrolimus  1.5 mg Oral BID IS    ursodiol  250 mg Oral BID    valGANciclovir  450 mg Oral Daily    vancomycin  125 mg Oral 4x Daily         Physical Exam   Temp  Av.8  F (37.1  C)  Min: 98.4  F (36.9  C)  Max: 99.5  F (37.5  C)      Pulse  Av.4  Min: 95  Max: 107 Resp  Av.3  Min: 16  Max: 18  SpO2  Av.5 %  Min: 92 %  Max: 98 %     BP  "(!) 133/94   Pulse 95   Temp 98.3  F (36.8  C) (Oral)   Resp 16   Ht 1.702 m (5' 7\")   Wt 99.7 kg (219 lb 12.8 oz)   SpO2 97%   BMI 34.43 kg/m      Admit Weight: 99.8 kg (220 lb)     GENERAL APPEARANCE: alert and no distress  HENT: mouth without ulcers or lesions  RESP: lungs clear to auscultation - no rales, rhonchi or wheezes  CV: regular rhythm, normal rate, no rub, no murmur  EDEMA: moderate edema RUE and RLE, Mild edema LLE  ABDOMEN: soft, nondistended, nontender, bowel sounds normal  MS: hands and feet tender to palpation  SKIN: no rash  PSYCH: mentation appears normal and affect normal/bright    Data   All labs reviewed by me.  CMP  Recent Labs   Lab 09/29/23  0520 09/28/23  0605 09/27/23  1152 09/27/23  0924 09/25/23  0759    135 134*  --  139   POTASSIUM 4.1 3.8 4.6  --  4.0   CHLORIDE 104 101 99  --  104   CO2 22 21* 21*  --  22   ANIONGAP 10 13 14  --  13   * 128* 175*  --  108*   BUN 18.3 18.7 18.8  --  18.8   CR 0.87 0.90 0.89  --  0.97   GFRESTIMATED >90 >90 >90  --  86   NAZARIO 9.2 9.1 9.3  --  9.5   MAG 1.7 1.6* 1.6*  --  1.4*   PHOS 3.1 2.4*  --  2.3* 1.8*   PROTTOTAL 5.9* 5.8* 6.5 6.5 6.5   ALBUMIN 2.9* 3.1* 3.5 3.6 3.9   BILITOTAL 0.6 0.8 1.1 1.1 0.5   ALKPHOS 278* 262* 317* 318* 267*   AST 39 37 56* 53* 59*   ALT 81* 86* 121* 123* 158*     CBC  Recent Labs   Lab 09/28/23  0605 09/27/23  1152 09/25/23  0759   HGB 10.1* 11.4* 11.8*   WBC 8.8 11.2* 8.6  8.6   RBC 3.69* 4.16* 4.29*   HCT 33.9* 38.7* 40.7   MCV 92 93 95   MCH 27.4 27.4 27.5   MCHC 29.8* 29.5* 29.0*   RDW 20.9* 21.2* 21.7*    231 257     INRNo lab results found in last 7 days.  ABGNo lab results found in last 7 days.   Urine Studies  Recent Labs   Lab Test 09/27/23  1157 09/25/23  0759 09/20/23  1645 08/29/23  1023 08/04/23  0715 06/16/23  1220   COLOR Dark Yellow* Yellow Yellow Yellow Yellow Yellow   APPEARANCE Clear Clear Clear Clear Clear Cloudy*   URINEGLC 50* Negative 100* Negative Negative 150* "   URINEBILI Small* Small* Negative Negative Negative Negative   URINEKETONE Negative Negative Negative Negative Negative Negative   SG 1.029 1.020 1.015 1.016 1.010 1.014   UBLD Negative Negative Negative Negative Negative Large*   URINEPH 6.0 7.0 7.0 7.0 6.5 5.5   PROTEIN 70* 30* Negative Negative Negative 70*   UROBILINOGEN  --  0.2 0.2  --  0.2  --    NITRITE Negative Negative Negative Negative Negative Negative   LEUKEST Negative Negative Negative Negative Small* Large*   RBCU 1 None Seen  --   --  0-2 >182*   WBCU 7* None Seen  --   --  0-5 56*     No lab results found.  PTH  Recent Labs   Lab Test 09/20/23  1611 08/01/23  0924 05/19/23  0956   PTHI 24 45 67*     Iron Studies  Recent Labs   Lab Test 09/05/23  0702 08/07/23  0726 08/01/23  0924 11/22/22  0848   IRON 32* 28* 23* 68    244 231* 219*   IRONSAT 12* 11* 10* 31   HOLA 485* 440* 506* 429*       IMAGING:  All imaging studies reviewed by me.

## 2023-09-29 NOTE — PROGRESS NOTES
Observation Goals:    1. Patient will be able to hydrate adequately (at least 2L per day PO). - Not met.  Encouraging oral intake  2. Patient will be seen by Rheumatology with appropriate treatment plan initiated. - Met.

## 2023-09-29 NOTE — PLAN OF CARE
"[id number]  BP (!) 146/107 (BP Location: Right arm)   Pulse 100   Temp 98.3  F (36.8  C) (Oral)   Resp 16   Ht 1.702 m (5' 7\")   Wt 99.7 kg (219 lb 12.8 oz)   SpO2 99%   BMI 34.43 kg/m      A&Ox4, calls appropriately. Hypertensive, provider notified, recheck /94 and provider notified again. OVSS on room air. Up independently in room. Ambulated in pierre x1 today with walker, wife, and RN. Regular diet, fair appetite, ate breakfast late and ordering lunch now. Denies nausea. Pain 6/10 in bilateral hands/feet, declines medical intervention. Voids spontaneously w/out difficulty, 1 BM on shift- soft. R PIV saline locked.     Observation Goals:  1. Improvement in joint pain/mobility: ongoing, progressing, pt reports pain is improving today however still edematous and decreased mobility in hands  2. Patient will be evaluated by Rheumatology with appropriate treatment plan initiated: Met, anakinra scheduled, pt being treated with 3 doses before discharge per rheumatology note (can extend to 5 days), this afternoon will be dose 2/3    Goal Outcome Evaluation:      Plan of Care Reviewed With: patient, spouse    Overall Patient Progress: improvingOverall Patient Progress: improving    Outcome Evaluation: Pt reports feeling better today, still edemetous however pt reports tenderness is improving, no PRN pain medications given.      "

## 2023-09-29 NOTE — PROGRESS NOTES
Rheumatology Progress Note    S :   He received the first dose of Anakinra yesterday 5pm.   This morning, he feels much better with much less pain in general. The swelling has improved a bit.   He can make firmer hand  and can use cell phone on his L hand (could not do that yesterday).  He can walk around the floor with some pain on his feet with some tingling.  He has 1 firm stool since this morning. Can eat well    14 point review of systems collected and negative if not documented above.    I have reviewed the patients past medical history, past surgical history, current medications, current allergies, family history and social history.     O : vitals stable     Extremities:   Sitting up in chair unassisted NAD  Breathing comfortably on room air, no wheeze, no cough, no use of accessory muscles  No rash  Less pain with palpation on his both hands/feet  Less limit ROM of wrists, hand , ankles  Marked swelling of Rt hand/both feet (but less than yesterday)    , ESR 66  RF - neg, anti-CCP pending    A : Acute gout flare     An excellent response to Anakinra supports gout diagnosis. With RF normal, this is less likely RA.  Dr. Horne contacted , his primary rheumatologist. He is working on insurance approval for getting outpatient Anakinra to be used during acute flares and not for ongoing prophylactic use.     Plan :   - Agree with a plan to discharge tomorrow after the 3rd dose of Anakinra  - Patient will discharge with 5 doses of Anakinra. If the symptoms are still there, they can do 4th-5th dose (10/1-10/2) to complete 5 doses. Otherwise, keep Anakinra for future flares. Advised to start Anakinra as soon as he feels gout symptoms. Early use of Anakinra can abort the flare faster. Risks and benefits of anakinra reviewed.    Patient was seen and discussed with Dr. Horne.    Martin Ramirez MD  Internal Medicine, PGY-1  [phone]  September 29, 2023 3:12 PM    Staff  addendum  I performed the history and physical examination of the patient and discussed the management with the resident. I reviewed the available lab and imaging studies. I reviewed the resident s note and agree with the documented findings and plan of care.    Charles Horne MD  Rheumatology

## 2023-09-29 NOTE — PHARMACY-ADMISSION MEDICATION HISTORY
Pharmacist Medication History obtained/completed by Parag Shannon, [facility] resident on 9/28/23. See his note for details.    Dami Fortune, ShahanaD

## 2023-09-29 NOTE — PLAN OF CARE
Goal Outcome Evaluation:  [id number] Nursing Shift Report:  Casey appeared to sleep soundly between cares.  VSS, using the urinal voiding without difficulty. Did medicate him x1 for hand and joint pain. Had problems with holding his cup, but was able to hold his urinal. Will continue to monitor and report any significant changes.

## 2023-09-29 NOTE — PLAN OF CARE
Goal Outcome Evaluation:      Plan of Care Reviewed With: patient, spouse          Outcome Evaluation: Patient will return home when med ready

## 2023-09-30 VITALS
HEIGHT: 67 IN | BODY MASS INDEX: 34.61 KG/M2 | SYSTOLIC BLOOD PRESSURE: 132 MMHG | RESPIRATION RATE: 18 BRPM | DIASTOLIC BLOOD PRESSURE: 86 MMHG | WEIGHT: 220.5 LBS | TEMPERATURE: 98.4 F | OXYGEN SATURATION: 99 % | HEART RATE: 95 BPM

## 2023-09-30 LAB
ALBUMIN SERPL BCG-MCNC: 3 G/DL (ref 3.5–5.2)
ALP SERPL-CCNC: 292 U/L (ref 40–129)
ALT SERPL W P-5'-P-CCNC: 91 U/L (ref 0–70)
ANION GAP SERPL CALCULATED.3IONS-SCNC: 14 MMOL/L (ref 7–15)
AST SERPL W P-5'-P-CCNC: 58 U/L (ref 0–45)
BILIRUB DIRECT SERPL-MCNC: 0.23 MG/DL (ref 0–0.3)
BILIRUB SERPL-MCNC: 0.4 MG/DL
BUN SERPL-MCNC: 18.7 MG/DL (ref 8–23)
CALCIUM SERPL-MCNC: 9.3 MG/DL (ref 8.8–10.2)
CCP AB SER IA-ACNC: 0.5 U/ML
CHLORIDE SERPL-SCNC: 104 MMOL/L (ref 98–107)
CREAT SERPL-MCNC: 0.86 MG/DL (ref 0.67–1.17)
DEPRECATED HCO3 PLAS-SCNC: 20 MMOL/L (ref 22–29)
EGFRCR SERPLBLD CKD-EPI 2021: >90 ML/MIN/1.73M2
GLUCOSE SERPL-MCNC: 116 MG/DL (ref 70–99)
MAGNESIUM SERPL-MCNC: 1.7 MG/DL (ref 1.7–2.3)
PHOSPHATE SERPL-MCNC: 3 MG/DL (ref 2.5–4.5)
POTASSIUM SERPL-SCNC: 3.9 MMOL/L (ref 3.4–5.3)
PROT SERPL-MCNC: 5.9 G/DL (ref 6.4–8.3)
SODIUM SERPL-SCNC: 138 MMOL/L (ref 135–145)
TACROLIMUS BLD-MCNC: 12.6 UG/L (ref 5–15)
TME LAST DOSE: NORMAL H
TME LAST DOSE: NORMAL H

## 2023-09-30 PROCEDURE — 250N000013 HC RX MED GY IP 250 OP 250 PS 637: Performed by: NURSE PRACTITIONER

## 2023-09-30 PROCEDURE — 250N000009 HC RX 250: Performed by: NURSE PRACTITIONER

## 2023-09-30 PROCEDURE — 80053 COMPREHEN METABOLIC PANEL: CPT | Performed by: NURSE PRACTITIONER

## 2023-09-30 PROCEDURE — 250N000012 HC RX MED GY IP 250 OP 636 PS 637: Performed by: NURSE PRACTITIONER

## 2023-09-30 PROCEDURE — 83735 ASSAY OF MAGNESIUM: CPT | Performed by: NURSE PRACTITIONER

## 2023-09-30 PROCEDURE — 36415 COLL VENOUS BLD VENIPUNCTURE: CPT | Performed by: NURSE PRACTITIONER

## 2023-09-30 PROCEDURE — 82248 BILIRUBIN DIRECT: CPT | Performed by: NURSE PRACTITIONER

## 2023-09-30 PROCEDURE — 80197 ASSAY OF TACROLIMUS: CPT | Performed by: NURSE PRACTITIONER

## 2023-09-30 PROCEDURE — 99233 SBSQ HOSP IP/OBS HIGH 50: CPT | Mod: FS | Performed by: NURSE PRACTITIONER

## 2023-09-30 PROCEDURE — G0378 HOSPITAL OBSERVATION PER HR: HCPCS

## 2023-09-30 PROCEDURE — 84100 ASSAY OF PHOSPHORUS: CPT | Performed by: NURSE PRACTITIONER

## 2023-09-30 RX ORDER — OXYCODONE HYDROCHLORIDE 5 MG/1
2.5-5 TABLET ORAL EVERY 6 HOURS PRN
Qty: 5 TABLET | Refills: 0 | Status: SHIPPED | OUTPATIENT
Start: 2023-09-30 | End: 2023-11-07

## 2023-09-30 RX ORDER — TACROLIMUS 0.5 MG/1
0.5 CAPSULE ORAL 2 TIMES DAILY
Qty: 60 CAPSULE | Refills: 11 | Status: SHIPPED | OUTPATIENT
Start: 2023-09-30 | End: 2023-10-06

## 2023-09-30 RX ORDER — TACROLIMUS 1 MG/1
CAPSULE ORAL
Qty: 450 CAPSULE | Refills: 3 | Status: SHIPPED | OUTPATIENT
Start: 2023-09-30 | End: 2023-10-06 | Stop reason: DRUGHIGH

## 2023-09-30 RX ADMIN — SODIUM PHOSPHATE, DIBASIC, ANHYDROUS, POTASSIUM PHOSPHATE, MONOBASIC, AND SODIUM PHOSPHATE, MONOBASIC, MONOHYDRATE 500 MG: 852; 155; 130 TABLET, COATED ORAL at 09:00

## 2023-09-30 RX ADMIN — VANCOMYCIN HYDROCHLORIDE 125 MG: 125 CAPSULE ORAL at 13:15

## 2023-09-30 RX ADMIN — Medication 1 TABLET: at 13:15

## 2023-09-30 RX ADMIN — LEVOTHYROXINE SODIUM 88 MCG: 88 TABLET ORAL at 09:00

## 2023-09-30 RX ADMIN — ANAKINRA 100 MG: 100 INJECTION, SOLUTION SUBCUTANEOUS at 13:15

## 2023-09-30 RX ADMIN — MYCOPHENOLIC ACID 540 MG: 360 TABLET, DELAYED RELEASE ORAL at 09:01

## 2023-09-30 RX ADMIN — METOPROLOL TARTRATE 25 MG: 25 TABLET, FILM COATED ORAL at 07:04

## 2023-09-30 RX ADMIN — VANCOMYCIN HYDROCHLORIDE 125 MG: 125 CAPSULE ORAL at 09:00

## 2023-09-30 RX ADMIN — PREDNISONE 10 MG: 10 TABLET ORAL at 09:00

## 2023-09-30 RX ADMIN — ALLOPURINOL 300 MG: 100 TABLET ORAL at 09:00

## 2023-09-30 RX ADMIN — PANTOPRAZOLE SODIUM 40 MG: 40 TABLET, DELAYED RELEASE ORAL at 08:59

## 2023-09-30 RX ADMIN — APIXABAN 5 MG: 5 TABLET, FILM COATED ORAL at 09:00

## 2023-09-30 RX ADMIN — SULFAMETHOXAZOLE AND TRIMETHOPRIM 1 TABLET: 400; 80 TABLET ORAL at 09:00

## 2023-09-30 RX ADMIN — OXYCODONE HYDROCHLORIDE 2.5 MG: 5 TABLET ORAL at 01:39

## 2023-09-30 RX ADMIN — URSODIOL 250 MG: 250 TABLET, FILM COATED ORAL at 09:01

## 2023-09-30 RX ADMIN — TACROLIMUS 1.5 MG: 1 CAPSULE ORAL at 09:01

## 2023-09-30 ASSESSMENT — ACTIVITIES OF DAILY LIVING (ADL)
ADLS_ACUITY_SCORE: 22
ADLS_ACUITY_SCORE: 22
ADLS_ACUITY_SCORE: 25
ADLS_ACUITY_SCORE: 22
ADLS_ACUITY_SCORE: 25
ADLS_ACUITY_SCORE: 25
ADLS_ACUITY_SCORE: 22
ADLS_ACUITY_SCORE: 22

## 2023-09-30 NOTE — PLAN OF CARE
"BP (!) 142/98 (BP Location: Right arm)   Pulse 96   Temp 98.1  F (36.7  C) (Oral)   Resp 18   Ht 1.702 m (5' 7\")   Wt 99.7 kg (219 lb 12.8 oz)   SpO2 97%   BMI 34.43 kg/m      Shift: 1767-7510  Isolation Status: enteric and contact   VS: VVS on RA, afebrile  Neuro: Aox4  Behaviors: calm and cooperative   BG: none   Labs: am labs pending   Respiratory: WDL  Cardiac: WDL  Pain/Nausea: pain managed with  oxycodone    PRN: 1x oxycodone  Diet: regular   IV Access: PIV SL  Infusion(s): none   Lines/Drains: none   GI/:  voiding good output . No BM for the shift   Skin: swelling +3 right hand and +1 edema to the left hand has been elevating his legs as needed.    Mobility: SBA  Events/Education: n/a  Plan: last dose Anakinra tomorrow and possible discharge afterwards.         "

## 2023-09-30 NOTE — PROGRESS NOTES
[facility]   Transplant Nephrology Progress Note  Date of Admission:  9/27/2023  Today's Date: 09/30/2023    Recommendations:  - No new recommendations at this time.  - Labs early this week.     Assessment & Plan   # DDKT (K): Stable   - Baseline Creatinine: ~ 1-1.2   - Proteinuria: Minimal (0.2-0.5 grams)   - Date DSA Last Checked: Aug/2023      Latest DSA: No but had DSA to Cq9 w/  at txp   - BK Viremia: No   - Kidney Tx Biopsy: Jun 20, 2023; Result: negative for rejection. Focal ATN   - Transplant Ureteral Stent: Removed      # Liver Tx (K):  Transaminases Trend down. Total bilirubin normal. Followed by transplant surgery.     # Immunosuppression: Tacrolimus immediate release (goal 8-10), Mycophenolic acid (dose 540 mg every 12 hours), and Prednisone (dose 10 mg daily)   - Induction with Recent Transplant:   rATG total 2mg/kg, stopped 2/2 sepsis   - Continue with intensive monitoring of immunosuppression for efficacy and toxicity.   - 09/29/23  Tacrolimus level: 13.8 (~ 21 hour trough)   - Changes: Not at this time. Primary team held a dose of tacrolimus yesterday and reduced the dose.    # Infection Prophylaxis:   - PJP: Sulfa/TMP (Bactrim)  - CMV: None, prophylaxis completed    # Hypertension: Borderline control;  Goal BP: < 140/90   - Volume status: Euvolemic  EDW: 215lbs   - Changes: Not at this time. Continue metoprolol 25 mg PO BID.    # Anemia in Chronic Renal Disease: Hgb: Trend down      FEDERICO: Yes, Aranesp for Hgb < 10    - Iron studies: Low iron saturation, start iron supplementation when infection resolved.    # Mineral Bone Disorder:   - Secondary renal hyperparathyroidism; PTH level: Normal (15-65 pg/ml)        On treatment: None  - Vitamin D; level: Normal          On supplement: No  - Calcium; level: Normal           On supplement: No  - Phosphorus; level: low normal     On supplement: Yes, phos tablet 500 mg PO BID     # Electrolytes:   -  Potassium; level: Normal         On supplement: No  - Magnesium; level: Normal          On supplement: No  - Bicarbonate; level:  Low        On supplement: No    #Clostridium difficile  # ID Workup: c/o abdominal cramping and ongoing diarrhea.  Liver enzymes elevated.  Rule out CMV and EBV viremia.  On vancomycin 125 mg four times daily for C. Diff.   - 9/27/23 CT a/p: No acute findings in the abdomen or pelvis.    - 9/28/23 Transplant Renal Ultrasound: Normal grayscale evaluation of the transplant kidney      9/27/2023   Urine Culture 50,000-100,000 CFU/mL Mixture of urogenital yuri     9/27/2023 CMV PCR Not detected    9/27/2023  EBV DNA PCR Quantitative Whole Blood Not detected    9/27/2023  Blood Culture Peripheral Blood no growth to date    9/27/2023  Blood Culture Peripheral Blood no growth to date    9/26/2023   Enteric Bacteria and Virus Panel negative    9/25/2023  C. Diff antigen and toxin A/B Positive       # Gout: presented 09/27/23 with swelling and pain in his right hand, right arm, and right foot/leg and are similar to gout flares he has had in the past.     -Continue allopurinol 300 mg daily.               -Follows with [facility]ology at [facility], Dr. Rucker.    - 9/28/23 No DVT in upper or lower extremities on ultrasound.   - Anakinra 100 mg subcutanoeous 9/28/23 - present    # Paroxysmal A-Fib:    -Stable on metoprolol and anticoagulated on apixaban     # ANCA Vasculitis: S/p Rituximab x2              - U/A negative on 8/4. 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    Seen and discussed with Dr. Scott.    Lorna Naik NP   Pager: [phone]    Physician Attestation     I saw and evaluated Damion Quinones as part of a shared APRN/PA visit.     I personally reviewed the vital signs, medications, and labs.    I personally performed the substantive portion of the medical decision making for this visit - please see the GEORGE's documentation for full details.    Key management decisions made by me and carried out under my direction: No acute indications for dialysis.  Would continue on present immunosuppression.  Recommend outpatient labs early next week.    Jeovany Scott MD  Date of Service (when I saw the patient): 09/30/23    Interval History     Mr. Quinones's creatinine is 0.86 (09/30 0548); Stable.  Good urine output.  Other significant labs/tests/vitals: VSS  no events overnight.  no chest pain or shortness of breath.  mild leg swelling. Patient states it is improving  no nausea and vomiting.  Bowel movements are improving.  no fever, sweats or chills.   Anakinra ordered 9/28/2023 for 3-5 doses.   I/O last 3 completed shifts:  In: 960 [P.O.:960]  Out: 1850 [Urine:1850]    - 9/28/23 Transplant Renal Ultrasound: Normal grayscale evaluation of the transplant kidney    - 9/28/23 No DVT in upper or lower extremities on ultrasound.    Review of Systems   4 point ROS was obtained and negative except as noted in the Interval History.    MEDICATIONS:   allopurinol  300 mg Oral Daily    anakinra  100 mg Subcutaneous Daily    apixaban ANTICOAGULANT  5 mg Oral BID    levothyroxine  88 mcg Oral Daily    metoprolol tartrate  25 mg Oral BID    multivitamin w/minerals  1 tablet Oral Daily    mycophenolic acid  540 mg Oral BID IS    pantoprazole  40 mg Oral QAM AC    phosphorus tablet 250 mg  500 mg Oral BID    predniSONE  10 mg Oral Daily    sodium chloride (PF)  3  "mL Intracatheter Q8H    sulfamethoxazole-trimethoprim  1 tablet Oral Daily    tacrolimus  1.5 mg Oral BID IS    ursodiol  250 mg Oral BID    vancomycin  125 mg Oral 4x Daily         Physical Exam   Temp  Av.8  F (37.1  C)  Min: 98.4  F (36.9  C)  Max: 99.5  F (37.5  C)      Pulse  Av.4  Min: 95  Max: 107 Resp  Av.3  Min: 16  Max: 18  SpO2  Av.5 %  Min: 92 %  Max: 98 %     BP (!) 163/97 (BP Location: Right arm)   Pulse 89   Temp 99  F (37.2  C) (Oral)   Resp 18   Ht 1.702 m (5' 7\")   Wt 100 kg (220 lb 8 oz)   SpO2 95%   BMI 34.54 kg/m      Admit Weight: 99.8 kg (220 lb)     GENERAL APPEARANCE: alert and no distress  HENT: mouth without ulcers or lesions  RESP: lungs clear to auscultation - no rales, rhonchi or wheezes  CV: regular rhythm, normal rate, no rub, no murmur  EDEMA: moderate edema RUE and RLE, Mild edema LLE  ABDOMEN: soft, nondistended, nontender, bowel sounds normal  MS: hands and feet tender to palpation  SKIN: no rash  PSYCH: mentation appears normal and affect normal/bright    Data   All labs reviewed by me.  CMP  Recent Labs   Lab 23  0548 23  0520 23  0605 23  1152 23  0924    136 135 134*  --    POTASSIUM 3.9 4.1 3.8 4.6  --    CHLORIDE 104 104 101 99  --    CO2 20* 22 21* 21*  --    ANIONGAP 14 10 13 14  --    * 118* 128* 175*  --    BUN 18.7 18.3 18.7 18.8  --    CR 0.86 0.87 0.90 0.89  --    GFRESTIMATED >90 >90 >90 >90  --    NAZARIO 9.3 9.2 9.1 9.3  --    MAG 1.7 1.7 1.6* 1.6*  --    PHOS 3.0 3.1 2.4*  --  2.3*   PROTTOTAL 5.9* 5.9* 5.8* 6.5 6.5   ALBUMIN 3.0* 2.9* 3.1* 3.5 3.6   BILITOTAL 0.4 0.6 0.8 1.1 1.1   ALKPHOS 292* 278* 262* 317* 318*   AST 58* 39 37 56* 53*   ALT 91* 81* 86* 121* 123*     CBC  Recent Labs   Lab 23  0605 23  1152 23  0759   HGB 10.1* 11.4* 11.8*   WBC 8.8 11.2* 8.6  8.6   RBC 3.69* 4.16* 4.29*   HCT 33.9* 38.7* 40.7   MCV 92 93 95   MCH 27.4 27.4 27.5   MCHC 29.8* 29.5* 29.0*   RDW " 20.9* 21.2* 21.7*    231 257     INRNo lab results found in last 7 days.  ABGNo lab results found in last 7 days.   Urine Studies  Recent Labs   Lab Test 09/27/23  1157 09/25/23  0759 09/20/23  1645 08/29/23  1023 08/04/23  0715 06/16/23  1220   COLOR Dark Yellow* Yellow Yellow Yellow Yellow Yellow   APPEARANCE Clear Clear Clear Clear Clear Cloudy*   URINEGLC 50* Negative 100* Negative Negative 150*   URINEBILI Small* Small* Negative Negative Negative Negative   URINEKETONE Negative Negative Negative Negative Negative Negative   SG 1.029 1.020 1.015 1.016 1.010 1.014   UBLD Negative Negative Negative Negative Negative Large*   URINEPH 6.0 7.0 7.0 7.0 6.5 5.5   PROTEIN 70* 30* Negative Negative Negative 70*   UROBILINOGEN  --  0.2 0.2  --  0.2  --    NITRITE Negative Negative Negative Negative Negative Negative   LEUKEST Negative Negative Negative Negative Small* Large*   RBCU 1 None Seen  --   --  0-2 >182*   WBCU 7* None Seen  --   --  0-5 56*     No lab results found.  PTH  Recent Labs   Lab Test 09/20/23  1611 08/01/23  0924 05/19/23  0956   PTHI 24 45 67*     Iron Studies  Recent Labs   Lab Test 09/05/23  0702 08/07/23  0726 08/01/23  0924 11/22/22  0848   IRON 32* 28* 23* 68    244 231* 219*   IRONSAT 12* 11* 10* 31   HOLA 485* 440* 506* 429*       IMAGING:  All imaging studies reviewed by me.

## 2023-10-02 LAB
BACTERIA BLD CULT: NO GROWTH
BACTERIA BLD CULT: NO GROWTH

## 2023-10-03 ENCOUNTER — TELEPHONE (OUTPATIENT)
Dept: TRANSPLANT | Facility: CLINIC | Age: 68
End: 2023-10-03
Payer: MEDICARE

## 2023-10-03 NOTE — TELEPHONE ENCOUNTER
Call to Casey and Nabila to check in after discharge from hospital over the weekend.     Casey doing better after getting Anakinra in the hospital. Will get labs either tomorrow morning or Thursday morning.

## 2023-10-04 ENCOUNTER — MYC MEDICAL ADVICE (OUTPATIENT)
Dept: TRANSPLANT | Facility: CLINIC | Age: 68
End: 2023-10-04
Payer: MEDICARE

## 2023-10-05 ENCOUNTER — THERAPY VISIT (OUTPATIENT)
Dept: PHYSICAL THERAPY | Facility: REHABILITATION | Age: 68
End: 2023-10-05
Payer: COMMERCIAL

## 2023-10-05 ENCOUNTER — LAB (OUTPATIENT)
Dept: LAB | Facility: CLINIC | Age: 68
End: 2023-10-05
Payer: COMMERCIAL

## 2023-10-05 ENCOUNTER — TELEPHONE (OUTPATIENT)
Dept: NEPHROLOGY | Facility: CLINIC | Age: 68
End: 2023-10-05

## 2023-10-05 ENCOUNTER — MYC REFILL (OUTPATIENT)
Dept: INFUSION THERAPY | Facility: CLINIC | Age: 68
End: 2023-10-05

## 2023-10-05 DIAGNOSIS — Z94.0 KIDNEY TRANSPLANT RECIPIENT: ICD-10-CM

## 2023-10-05 DIAGNOSIS — Z94.4 LIVER TRANSPLANT RECIPIENT (H): ICD-10-CM

## 2023-10-05 DIAGNOSIS — Z48.298 AFTERCARE FOLLOWING ORGAN TRANSPLANT: ICD-10-CM

## 2023-10-05 DIAGNOSIS — Z74.09 IMPAIRED FUNCTIONAL MOBILITY, BALANCE, GAIT, AND ENDURANCE: Primary | ICD-10-CM

## 2023-10-05 DIAGNOSIS — Z94.4 LIVER REPLACED BY TRANSPLANT (H): ICD-10-CM

## 2023-10-05 DIAGNOSIS — K70.30 ALCOHOLIC CIRRHOSIS (H): ICD-10-CM

## 2023-10-05 DIAGNOSIS — D63.1 ANEMIA IN STAGE 2 CHRONIC KIDNEY DISEASE: ICD-10-CM

## 2023-10-05 DIAGNOSIS — N18.2 ANEMIA IN STAGE 2 CHRONIC KIDNEY DISEASE: ICD-10-CM

## 2023-10-05 DIAGNOSIS — Z79.899 ENCOUNTER FOR LONG-TERM CURRENT USE OF MEDICATION: ICD-10-CM

## 2023-10-05 DIAGNOSIS — Z94.0 KIDNEY REPLACED BY TRANSPLANT: ICD-10-CM

## 2023-10-05 LAB
ALBUMIN SERPL BCG-MCNC: 3.7 G/DL (ref 3.5–5.2)
ALP SERPL-CCNC: 240 U/L (ref 40–129)
ALT SERPL W P-5'-P-CCNC: 52 U/L (ref 0–70)
ANION GAP SERPL CALCULATED.3IONS-SCNC: 14 MMOL/L (ref 7–15)
AST SERPL W P-5'-P-CCNC: 31 U/L (ref 0–45)
BASO+EOS+MONOS # BLD AUTO: NORMAL 10*3/UL
BASO+EOS+MONOS NFR BLD AUTO: NORMAL %
BASOPHILS # BLD AUTO: NORMAL 10*3/UL
BASOPHILS # BLD MANUAL: 0.1 10E3/UL (ref 0–0.2)
BASOPHILS NFR BLD AUTO: NORMAL %
BASOPHILS NFR BLD MANUAL: 2 %
BILIRUB DIRECT SERPL-MCNC: <0.2 MG/DL (ref 0–0.3)
BILIRUB SERPL-MCNC: 0.4 MG/DL
BUN SERPL-MCNC: 19.2 MG/DL (ref 8–23)
CALCIUM SERPL-MCNC: 9.5 MG/DL (ref 8.8–10.2)
CHLORIDE SERPL-SCNC: 102 MMOL/L (ref 98–107)
CREAT SERPL-MCNC: 0.94 MG/DL (ref 0.67–1.17)
DEPRECATED HCO3 PLAS-SCNC: 24 MMOL/L (ref 22–29)
EGFRCR SERPLBLD CKD-EPI 2021: 89 ML/MIN/1.73M2
EOSINOPHIL # BLD AUTO: NORMAL 10*3/UL
EOSINOPHIL # BLD MANUAL: 0.2 10E3/UL (ref 0–0.7)
EOSINOPHIL NFR BLD AUTO: NORMAL %
EOSINOPHIL NFR BLD MANUAL: 4 %
ERYTHROCYTE [DISTWIDTH] IN BLOOD BY AUTOMATED COUNT: 20.6 % (ref 10–15)
FERRITIN SERPL-MCNC: 919 NG/ML (ref 31–409)
GLUCOSE SERPL-MCNC: 106 MG/DL (ref 70–99)
HCT VFR BLD AUTO: 39.8 % (ref 40–53)
HGB BLD-MCNC: 11.9 G/DL (ref 13.3–17.7)
IMM GRANULOCYTES # BLD: NORMAL 10*3/UL
IMM GRANULOCYTES NFR BLD: NORMAL %
IRON BINDING CAPACITY (ROCHE): 241 UG/DL (ref 240–430)
IRON SATN MFR SERPL: 34 % (ref 15–46)
IRON SERPL-MCNC: 83 UG/DL (ref 61–157)
LYMPHOCYTES # BLD AUTO: NORMAL 10*3/UL
LYMPHOCYTES # BLD MANUAL: 0.5 10E3/UL (ref 0.8–5.3)
LYMPHOCYTES NFR BLD AUTO: NORMAL %
LYMPHOCYTES NFR BLD MANUAL: 9 %
MAGNESIUM SERPL-MCNC: 1.7 MG/DL (ref 1.7–2.3)
MCH RBC QN AUTO: 27.5 PG (ref 26.5–33)
MCHC RBC AUTO-ENTMCNC: 29.9 G/DL (ref 31.5–36.5)
MCV RBC AUTO: 92 FL (ref 78–100)
MONOCYTES # BLD AUTO: NORMAL 10*3/UL
MONOCYTES # BLD MANUAL: 0.3 10E3/UL (ref 0–1.3)
MONOCYTES NFR BLD AUTO: NORMAL %
MONOCYTES NFR BLD MANUAL: 5 %
NEUTROPHILS # BLD AUTO: NORMAL 10*3/UL
NEUTROPHILS # BLD MANUAL: 4.1 10E3/UL (ref 1.6–8.3)
NEUTROPHILS NFR BLD AUTO: NORMAL %
NEUTROPHILS NFR BLD MANUAL: 80 %
NRBC # BLD AUTO: 0 10E3/UL
NRBC # BLD AUTO: 0.1 10E3/UL
NRBC BLD AUTO-RTO: 0 /100
NRBC BLD MANUAL-RTO: 1 %
PHOSPHATE SERPL-MCNC: 3.6 MG/DL (ref 2.5–4.5)
PLAT MORPH BLD: ABNORMAL
PLATELET # BLD AUTO: 247 10E3/UL (ref 150–450)
POLYCHROMASIA BLD QL SMEAR: SLIGHT
POTASSIUM SERPL-SCNC: 4.1 MMOL/L (ref 3.4–5.3)
PROT SERPL-MCNC: 6.1 G/DL (ref 6.4–8.3)
RBC # BLD AUTO: 4.33 10E6/UL (ref 4.4–5.9)
RBC MORPH BLD: ABNORMAL
SODIUM SERPL-SCNC: 140 MMOL/L (ref 135–145)
TACROLIMUS BLD-MCNC: 6 UG/L (ref 5–15)
TME LAST DOSE: NORMAL H
TME LAST DOSE: NORMAL H
WBC # BLD AUTO: 4.8 10E3/UL (ref 4–11)
WBC # BLD AUTO: 5.1 10E3/UL (ref 4–11)

## 2023-10-05 PROCEDURE — 99000 SPECIMEN HANDLING OFFICE-LAB: CPT

## 2023-10-05 PROCEDURE — 80197 ASSAY OF TACROLIMUS: CPT

## 2023-10-05 PROCEDURE — 80180 DRUG SCRN QUAN MYCOPHENOLATE: CPT

## 2023-10-05 PROCEDURE — 83550 IRON BINDING TEST: CPT

## 2023-10-05 PROCEDURE — 36415 COLL VENOUS BLD VENIPUNCTURE: CPT

## 2023-10-05 PROCEDURE — 80053 COMPREHEN METABOLIC PANEL: CPT

## 2023-10-05 PROCEDURE — 85027 COMPLETE CBC AUTOMATED: CPT

## 2023-10-05 PROCEDURE — G0480 DRUG TEST DEF 1-7 CLASSES: HCPCS | Mod: 90

## 2023-10-05 PROCEDURE — 83735 ASSAY OF MAGNESIUM: CPT

## 2023-10-05 PROCEDURE — 82248 BILIRUBIN DIRECT: CPT

## 2023-10-05 PROCEDURE — 84100 ASSAY OF PHOSPHORUS: CPT

## 2023-10-05 PROCEDURE — 97750 PHYSICAL PERFORMANCE TEST: CPT | Mod: GP

## 2023-10-05 PROCEDURE — 82728 ASSAY OF FERRITIN: CPT

## 2023-10-05 PROCEDURE — 83540 ASSAY OF IRON: CPT

## 2023-10-05 PROCEDURE — 85007 BL SMEAR W/DIFF WBC COUNT: CPT | Mod: QW

## 2023-10-05 PROCEDURE — 87799 DETECT AGENT NOS DNA QUANT: CPT

## 2023-10-05 RX ORDER — PANTOPRAZOLE SODIUM 40 MG/1
40 TABLET, DELAYED RELEASE ORAL
Qty: 90 TABLET | Refills: 3 | Status: SHIPPED | OUTPATIENT
Start: 2023-10-05 | End: 2023-12-28

## 2023-10-05 RX ORDER — ATORVASTATIN CALCIUM 20 MG/1
20 TABLET, FILM COATED ORAL EVERY EVENING
Qty: 30 TABLET | Refills: 1 | Status: SHIPPED | OUTPATIENT
Start: 2023-10-05 | End: 2023-11-28

## 2023-10-05 NOTE — PROGRESS NOTES
[facility]                                                                                   OUTPATIENT PHYSICAL THERAPY    PLAN OF TREATMENT FOR OUTPATIENT REHABILITATION   Patient's Last Name, First Name, Damion Saenz YOB: 1955   Provider's Name   [facility]   Medical Record No.  [id number]     Onset Date: 06/06/23 (Date of surgery)  Start of Care Date: 07/28/23     Medical Diagnosis:  Kidney transplant recipient  Liver transplant recipient (H)      PT Treatment Diagnosis:  Impaired functional mobility, gait, balance, and endurance Plan of Treatment  Frequency/Duration: 1 timer per week to 1 time every other week/ 8 weeks    Certification date from 10/05/23 to 11/30/23         See note for plan of treatment details and functional goals     Syd Grove, PT                         I CERTIFY THE NEED FOR THESE SERVICES FURNISHED UNDER        THIS PLAN OF TREATMENT AND WHILE UNDER MY CARE     (Physician attestation of this document indicates review and certification of the therapy plan).                Referring Provider:  Lashay Rachel      Initial Assessment  See Epic Evaluation- Start of Care Date: 07/28/23                 10/05/23 0500   Appointment Info   Signing clinician's name / credentials Syd Grove PT and WOO Martin   Visits Used 6   Medical Diagnosis Kidney transplant recipient  Liver transplant recipient (H)   PT Tx Diagnosis Impaired functional mobility, gait, balance, and endurance   Progress Note/Certification   Start of Care Date 07/28/23   Onset of illness/injury or Date of Surgery 06/06/23  (Date of surgery)   Therapy Frequency 1 timer per week to 1 time every other week   Predicted Duration 8 weeks   Certification date from 10/05/23   Certification date to 11/30/23   Progress Note Due Date 11/02/23   Progress Note Completed Date 10/05/23   Supervision   Student Supervision Direct supervision  provided;Direct Patient Contact Provided;Therapy services provided with the co-signing licensed therapist guiding and directing the services, and providing the skilled judgement and assessment throughout the session   PT Goal 1   Goal Identifier LEFS - Short Term   Goal Description Casey will demonstrate significantly improved function as evidenced by an improved LEFS score of at least 17/80.   Goal Progress 39/80   Target Date 08/25/23   Date Met 08/31/23   PT Goal 2   Goal Identifier LEFS - Long Term   Goal Description Casey will demonstrate significantly improved function as evidenced by an improved LEFS score of at least 50/80.   Goal Progress 39/80  (10/5/23: 24/80)   Target Date 11/30/23   PT Goal 3   Goal Identifier Walking   Goal Description Casey will demonstrate improved tolerance to and safety and independence with functional mobility as evidenced by his ability to walk for up to 20 minutes without need for assistive device.   Goal Progress 5 minutes with no AD   Target Date 11/30/23   PT Goal 4   Goal Identifier TUG   Goal Description Casey will demonstrate improved functional mobility and a decreased fall risk as evidenced by an improved (decreased) TUG time of less than 13 seconds.   Goal Progress 8.47 no AD   Target Date 10/06/23   Date Met 08/31/23   PT Goal 5   Goal Identifier 30 Second Sit-to-stand   Goal Description Casey will demonstrate improved lower extremity strength and increased tolerance to functional transfers as evidenced by an improved 30 second sit-to-stand score of at least 10 reps without upper extremity assist.   Goal Progress 10 reps with no UE assist (28s)   Target Date 10/06/23   Date Met 08/31/23   PT Goal 6   Goal Identifier 6 Minute Walk Test   Goal Description Casey will demonstrate improved functional capacity as evidenced by an improved 6 MWT distance of at least 1100 feet.   Goal Progress 557.3 feet (stopped at 2:41)   Target Date 11/30/23   PT Goal 7   Goal Identifier FGA   Goal  Description Casey will demonstrate improved safety and independence with functional mobility and a decreased fall risk as evidenced by an improved FGA score of at least 18/30.   Goal Progress 23/30 (no assistive device)   Target Date 10/06/23   Date Met 10/05/23   Subjective Report   Subjective Report Casey reports a recent gout flair and C-diff infection that is being managed with antibiotics. He has been feeling better and stronger since his ED visit. He reports symptom improvement since his last visit and he is not using his walker in his visit today. Casey mentioned wanting to discuss further treatment options with neurology.   Objective Measures   Objective Measures Objective Measure 1;Objective Measure 2;Objective Measure 3;Objective Measure 4;Objective Measure 5;Objective Measure 6   Objective Measure 1   Objective Measure TUG   Details 8.47 no AD   Objective Measure 2   Objective Measure 8/31/23: 30 Second Sit-to-stand   Details 10 reps with no UE assist (28 seconds)   Objective Measure 3   Objective Measure Gait   Details Initial evaluation: 108.7 feet with standard walker (notably winded afterward)   Objective Measure 4   Objective Measure FGA   Details 23/30 (no assistive device)   Objective Measure 5   Objective Measure 6 Minute Walk Test   Details 557.3 feet (stopped at 2:41)   Objective Measure 6   Objective Measure Stairs   Details Up to 16 stairs ascending and descending  SBA; gait belt used;  with bilateral upper extremity assist. modified independent with some   Therapeutic Procedure/Exercise   Therapeutic Procedures: strength, endurance, ROM, flexibillity minutes (58360) 2   Ther Proc 2 sit to stands   Ther Proc 2 - Details 1x10   Skilled Intervention Exercises to improve general strength and endurance   Patient Response/Progress Casey tolerated all exercises and well with no  increase in symptoms.   Physical Performance Test/measures   Physical Performance Test/Measurement, Minutes (05389) 39   Patient  "Response/Progress See objective measures.   Skilled Intervention FGA. 30 second sit-to-stand. TUG. 6 minute walk test. Stair assessment.   Education   Learner/Method Patient;Significant Other;Listening;Demonstration;Pictures/Video;No Barriers to Learning   Plan   Home program See PTRx.   Plan for next session Continue to progress general stength and endurance exercises. Continue balance and gait training as appropriate.   Comments   Comments Assessment: Casey reported a recent flair of gout and C-diff infection that is being medically managed. He has been feeling better with improved symptoms since his last visit and since his hospitalization. He demonstrates improved TUG, FGA, and 6MWT scores of 8.47s, 23/30, and 557.3 feet respectively. This is evidence of improved functional mobility, balance, and safety, and a decreased fall risk. He was also able to climb significantly more stairs today (16 stairs) than previously (8 steps) with railings on each side, demonstrating modified independence initiall, though with SBA for last few steps due to fatigue. This demonstrates improved leg strength and endurance and safety and independence with functional mobility. He did not use any assistive devices today. Casey mentioned wanting to discuss further treatment options with neurology regarding possible \"neuropathy\" in feet/lower legs. Patient will be traveling for the next month, but he will continue to benefit from physical therapy to progress toward his remaining goals and improve his function when he returns. His goals and plan of care have been updated accordingly.   Total Session Time   Timed Code Treatment Minutes 41   Total Treatment Time (sum of timed and untimed services) 41     "

## 2023-10-05 NOTE — TELEPHONE ENCOUNTER
[facility] Call Center    Phone Message    May a detailed message be left on voicemail: yes     Reason for Call: Other:       Sylvia is wanting to know the status of a kineret patient assistance application that was sent over. Please call her back with any questions, she states that she will be faxing it over again today. Writer did confirm fax number.    Action Taken: Message routed to:  [facility] neph    Travel Screening: Not Applicable

## 2023-10-06 ENCOUNTER — TELEPHONE (OUTPATIENT)
Dept: TRANSPLANT | Facility: CLINIC | Age: 68
End: 2023-10-06
Payer: MEDICARE

## 2023-10-06 DIAGNOSIS — Z94.0 KIDNEY REPLACED BY TRANSPLANT: ICD-10-CM

## 2023-10-06 DIAGNOSIS — Z94.4 LIVER TRANSPLANT RECIPIENT (H): ICD-10-CM

## 2023-10-06 LAB
BKV DNA # SPEC NAA+PROBE: NOT DETECTED COPIES/ML
MYCOPHENOLATE SERPL LC/MS/MS-MCNC: 0.91 MG/L (ref 1–3.5)
MYCOPHENOLATE-G SERPL LC/MS/MS-MCNC: 36.2 MG/L (ref 30–95)
TME LAST DOSE: ABNORMAL H
TME LAST DOSE: ABNORMAL H

## 2023-10-06 RX ORDER — TACROLIMUS 1 MG/1
2 CAPSULE ORAL 2 TIMES DAILY
Qty: 360 CAPSULE | Refills: 3 | Status: SHIPPED | OUTPATIENT
Start: 2023-10-06 | End: 2023-10-30

## 2023-10-06 RX ORDER — TACROLIMUS 0.5 MG/1
0.5 CAPSULE ORAL 2 TIMES DAILY
Qty: 180 CAPSULE | Refills: 3 | Status: SHIPPED | OUTPATIENT
Start: 2023-10-06 | End: 2023-10-30

## 2023-10-07 ENCOUNTER — MYC MEDICAL ADVICE (OUTPATIENT)
Dept: FAMILY MEDICINE | Facility: CLINIC | Age: 68
End: 2023-10-07

## 2023-10-07 ENCOUNTER — IMMUNIZATION (OUTPATIENT)
Dept: FAMILY MEDICINE | Facility: CLINIC | Age: 68
End: 2023-10-07
Payer: COMMERCIAL

## 2023-10-07 DIAGNOSIS — Z23 NEED FOR PROPHYLACTIC VACCINATION AND INOCULATION AGAINST INFLUENZA: Primary | ICD-10-CM

## 2023-10-07 PROCEDURE — 90662 IIV NO PRSV INCREASED AG IM: CPT

## 2023-10-07 PROCEDURE — 90471 IMMUNIZATION ADMIN: CPT

## 2023-10-08 LAB
LABORATORY REPORT: NORMAL
PLPETH BLD-MCNC: <10 NG/ML
POPETH BLD-MCNC: <10 NG/ML

## 2023-10-09 PROCEDURE — 93224 XTRNL ECG REC UP TO 48 HRS: CPT | Performed by: EMERGENCY MEDICINE

## 2023-10-09 NOTE — TELEPHONE ENCOUNTER
[facility] Call Center    Phone Message    May a detailed message be left on voicemail: yes     Reason for Call: Other: Please call Sylvia and update on below asap thank you     Action Taken: Message routed to:  [facility] neph    Travel Screening: Not Applicable                                                                    28-Sep-2020

## 2023-10-10 ENCOUNTER — PATIENT OUTREACH (OUTPATIENT)
Dept: CARE COORDINATION | Facility: CLINIC | Age: 68
End: 2023-10-10

## 2023-10-10 ENCOUNTER — LAB (OUTPATIENT)
Dept: LAB | Facility: CLINIC | Age: 68
End: 2023-10-10
Payer: COMMERCIAL

## 2023-10-10 DIAGNOSIS — I48.21 PERMANENT ATRIAL FIBRILLATION WITH RAPID VENTRICULAR RESPONSE (H): ICD-10-CM

## 2023-10-10 DIAGNOSIS — N18.2 ANEMIA IN STAGE 2 CHRONIC KIDNEY DISEASE: ICD-10-CM

## 2023-10-10 DIAGNOSIS — Z94.0 KIDNEY REPLACED BY TRANSPLANT: ICD-10-CM

## 2023-10-10 DIAGNOSIS — Z94.4 LIVER REPLACED BY TRANSPLANT (H): ICD-10-CM

## 2023-10-10 DIAGNOSIS — Z94.4 LIVER TRANSPLANT RECIPIENT (H): ICD-10-CM

## 2023-10-10 DIAGNOSIS — Z79.899 ENCOUNTER FOR LONG-TERM CURRENT USE OF MEDICATION: ICD-10-CM

## 2023-10-10 DIAGNOSIS — Z48.298 AFTERCARE FOLLOWING ORGAN TRANSPLANT: ICD-10-CM

## 2023-10-10 DIAGNOSIS — D63.1 ANEMIA IN STAGE 2 CHRONIC KIDNEY DISEASE: ICD-10-CM

## 2023-10-10 LAB
ALBUMIN SERPL BCG-MCNC: 3.6 G/DL (ref 3.5–5.2)
ALP SERPL-CCNC: 193 U/L (ref 40–129)
ALT SERPL W P-5'-P-CCNC: 30 U/L (ref 0–70)
ANION GAP SERPL CALCULATED.3IONS-SCNC: 12 MMOL/L (ref 7–15)
AST SERPL W P-5'-P-CCNC: 19 U/L (ref 0–45)
BASO+EOS+MONOS # BLD AUTO: NORMAL 10*3/UL
BASO+EOS+MONOS NFR BLD AUTO: NORMAL %
BASOPHILS # BLD AUTO: NORMAL 10*3/UL
BASOPHILS NFR BLD AUTO: 2 %
BILIRUB DIRECT SERPL-MCNC: <0.2 MG/DL (ref 0–0.3)
BILIRUB SERPL-MCNC: 0.4 MG/DL
BUN SERPL-MCNC: 16.5 MG/DL (ref 8–23)
CALCIUM SERPL-MCNC: 9.5 MG/DL (ref 8.8–10.2)
CHLORIDE SERPL-SCNC: 103 MMOL/L (ref 98–107)
CREAT SERPL-MCNC: 0.97 MG/DL (ref 0.67–1.17)
DEPRECATED HCO3 PLAS-SCNC: 25 MMOL/L (ref 22–29)
EGFRCR SERPLBLD CKD-EPI 2021: 86 ML/MIN/1.73M2
EOSINOPHIL # BLD AUTO: NORMAL 10*3/UL
EOSINOPHIL NFR BLD AUTO: 4 %
ERYTHROCYTE [DISTWIDTH] IN BLOOD BY AUTOMATED COUNT: 21.1 % (ref 10–15)
GLUCOSE SERPL-MCNC: 100 MG/DL (ref 70–99)
HCT VFR BLD AUTO: 39.4 % (ref 40–53)
HGB BLD-MCNC: 11.8 G/DL (ref 13.3–17.7)
IMM GRANULOCYTES # BLD: NORMAL 10*3/UL
IMM GRANULOCYTES NFR BLD: 4 %
LYMPHOCYTES # BLD AUTO: NORMAL 10*3/UL
LYMPHOCYTES NFR BLD AUTO: 11 %
MAGNESIUM SERPL-MCNC: 1.6 MG/DL (ref 1.7–2.3)
MCH RBC QN AUTO: 27.5 PG (ref 26.5–33)
MCHC RBC AUTO-ENTMCNC: 29.9 G/DL (ref 31.5–36.5)
MCV RBC AUTO: 92 FL (ref 78–100)
MONOCYTES # BLD AUTO: NORMAL 10*3/UL
MONOCYTES NFR BLD AUTO: 4 %
NEUTROPHILS # BLD AUTO: NORMAL 10*3/UL
NEUTROPHILS NFR BLD AUTO: 75 %
PHOSPHATE SERPL-MCNC: 3.6 MG/DL (ref 2.5–4.5)
PLATELET # BLD AUTO: 207 10E3/UL (ref 150–450)
POTASSIUM SERPL-SCNC: 4.1 MMOL/L (ref 3.4–5.3)
PROT SERPL-MCNC: 5.8 G/DL (ref 6.4–8.3)
RBC # BLD AUTO: 4.29 10E6/UL (ref 4.4–5.9)
SODIUM SERPL-SCNC: 140 MMOL/L (ref 135–145)
TACROLIMUS BLD-MCNC: 10.6 UG/L (ref 5–15)
TME LAST DOSE: NORMAL H
TME LAST DOSE: NORMAL H
WBC # BLD AUTO: 5.5 10E3/UL (ref 4–11)
WBC # BLD AUTO: 5.5 10E3/UL (ref 4–11)

## 2023-10-10 PROCEDURE — 83735 ASSAY OF MAGNESIUM: CPT

## 2023-10-10 PROCEDURE — 85025 COMPLETE CBC W/AUTO DIFF WBC: CPT | Mod: QW

## 2023-10-10 PROCEDURE — 36415 COLL VENOUS BLD VENIPUNCTURE: CPT

## 2023-10-10 PROCEDURE — 82248 BILIRUBIN DIRECT: CPT

## 2023-10-10 PROCEDURE — 80197 ASSAY OF TACROLIMUS: CPT

## 2023-10-10 PROCEDURE — 80053 COMPREHEN METABOLIC PANEL: CPT

## 2023-10-10 PROCEDURE — 84100 ASSAY OF PHOSPHORUS: CPT

## 2023-10-10 RX ORDER — METOPROLOL TARTRATE 25 MG/1
25 TABLET, FILM COATED ORAL 2 TIMES DAILY
Qty: 180 TABLET | Refills: 3 | Status: SHIPPED | OUTPATIENT
Start: 2023-10-10 | End: 2023-11-07

## 2023-10-10 NOTE — PROGRESS NOTES
Anemia Management Note  SUBJECTIVE/OBJECTIVE:  Referred by Dr. Subhash Dutta on 2023  Primary Diagnosis: Anemia of Other Chronic Illness (D63.8)     Secondary Diagnosis:  Organ or tissue replaced by transplant, kidney (Z94.0)  Kidney Tx: 2023  Hgb goal range:  9-10  Epo/Darbo: Aranesp  40 mcg  every two weeks for Hgb <10.0. In clinic  *If Aranesp is ordered dose at 0.45mcg/kg  Iron regimen:  NA  Labs : 2024  Recent FEDERICO use, transfusion, IV iron: Mircera   RX/TX plans : 2024     *[facility]; [phone]      Hx of DVT (2023), Afib, anticoagulate.      Consent to Communicate:  OK to speak with Nabila Quinones (wife) per consent to communicate dated 10/27/2022. No Boxes Checked on Consent to Communicate.         Latest Ref Rng & Units 2023    12:00 PM 2023     8:06 AM 2023     7:59 AM 2023    11:52 AM 2023     6:05 AM 10/5/2023     7:52 AM 10/10/2023     7:57 AM   Anemia   Hemoglobin 13.3 - 17.7 g/dL  10.3  11.8  11.4  10.1  11.9  11.8    IV Iron Dose  750mg         Ferritin 31 - 409 ng/mL      919       BP Readings from Last 3 Encounters:   23 132/86   23 (!) 146/92   23 107/70     Wt Readings from Last 2 Encounters:   23 100 kg (220 lb 8 oz)   23 99.9 kg (220 lb 4.8 oz)           ASSESSMENT:  Hgb:Above goal - recommend hold dose  TSat: Due for labs Ferritin: Due for labs    PLAN:  Hold Aranesp and RTC for hgb then aranesp if needed in 2-4 week(s)    Orders needed to be renewed (for next follow-up date) in EPIC: None    Iron labs due:  Due in Oct 2023    Plan discussed with:  No call, chart review       NEXT FOLLOW-UP DATE:  23 9330 appt    Molly Fenton RN   Anemia Services  [facility]  [email or web address]   Office : [phone]  Fax: [phone]

## 2023-10-11 DIAGNOSIS — I77.82 ANCA-ASSOCIATED VASCULITIS (H): Primary | ICD-10-CM

## 2023-10-11 DIAGNOSIS — M1A.9XX1 TOPHACEOUS GOUT: ICD-10-CM

## 2023-10-11 NOTE — TELEPHONE ENCOUNTER
[facility] Call Center    Phone Message    May a detailed message be left on voicemail: no     Reason for Call: Other: Sylvia is calling back just looking for an update on if we got the fax for anakinra (KINERET) 100 MG/0.67ML SOSY injection. They stated that that they need the name of the medication on the application along with the Doctor's portion filled ou. Please call if there are any question.     Action Taken: Message routed to:  [facility] Nephrology    Travel Screening: Not Applicable

## 2023-10-12 ENCOUNTER — PREP FOR PROCEDURE (OUTPATIENT)
Dept: TRANSPLANT | Facility: CLINIC | Age: 68
End: 2023-10-12

## 2023-10-12 ENCOUNTER — TELEPHONE (OUTPATIENT)
Dept: TRANSPLANT | Facility: CLINIC | Age: 68
End: 2023-10-12

## 2023-10-12 DIAGNOSIS — K42.9 HERNIA, UMBILICAL: Primary | ICD-10-CM

## 2023-10-16 NOTE — TELEPHONE ENCOUNTER
Spoke to Sylvia last week. Let her know that coordinator would speak to Casey about copay card and whether or not this application would be needed and would follow up with Sylvia in a couple weeks.

## 2023-10-17 ENCOUNTER — TELEPHONE (OUTPATIENT)
Dept: TRANSPLANT | Facility: CLINIC | Age: 68
End: 2023-10-17
Payer: MEDICARE

## 2023-10-17 NOTE — TELEPHONE ENCOUNTER
Current standing lab orders faxed to [place] [facility] and sent copy to patient via [employer].

## 2023-10-17 NOTE — TELEPHONE ENCOUNTER
Wife, Apoorva called ;  Casey and Apoorva are in [place], [place] vacationing.   They are asking for lab orders to be faxed to [facility]  Fax# [phone]    PH# [phone]    [address] [place] [address]    Casey plans to have labs drawn the week of 10/23/2023 (Mon)    Per request of patient;  please update/clean up lab order.

## 2023-10-17 NOTE — TELEPHONE ENCOUNTER
[facility] Call Center    Phone Message    May a detailed message be left on voicemail: yes     Reason for Call: Other: Sylvia is calling back for below, she states she thought she was supposed to be given a call yesterday      Action Taken: Message routed to:  Other: [facility]    Travel Screening: Not Applicable

## 2023-10-20 ENCOUNTER — MYC MEDICAL ADVICE (OUTPATIENT)
Dept: TRANSPLANT | Facility: CLINIC | Age: 68
End: 2023-10-20
Payer: MEDICARE

## 2023-10-24 ENCOUNTER — TELEPHONE (OUTPATIENT)
Dept: PHARMACY | Facility: CLINIC | Age: 68
End: 2023-10-24
Payer: MEDICARE

## 2023-10-24 NOTE — TELEPHONE ENCOUNTER
Clinical Pharmacy Consult:                                                      Transplant Specific: 3 month post transplant pharmacy review  Date of Transplant: 06/06/2023  Type of Transplant: kidney and liver  First Transplant: yes  History of rejection: no    Immunosuppression Regimen   TAC 2.5 mg qAM & 2.5 mg qPM, Prednisone 10 mg daily, and Myforitic 540 mg qAM & 540 mg qPM  Patient specific goal: 8-12  Most recent level: 10.6 on 10/10/23  Immunosuppressant Levels:  Therapeutic  Pt adherent to lab draws: yes  Scr:   Lab Results   Component Value Date    CR 1.08 07/26/2023    CR 1.11 08/27/2019     Side effects: none    Prophylactic Medications  Antibacterial:  Bactrim 400-800 daily  Scheduled Discontinue Date: Lifelong    Antifungal: Nystatin  Scheduled Discontinue Date: Therapy Complete    Antiviral: Max dose in Liver transplant is Valcyte 450mg once daily   Scheduled Discontinue Date: 3 months    Acid Reducer: Protonix (pantoprazole)  Scheduled Reviewed Date:  MD to evaluate    Thrombosis Prevention: Apixiban 5 mg twice daily  Scheduled Discontinue Date:  MD to evaluate    Blood Pressure Management  Frequency of home Blood Pressure checks:  Infrequent at home, they are getting frequent checks at office visits  Most recent home BP:  132/86  Patient Blood pressure goal: <140/90  Patient blood pressure at goal:  yes  Hospitalizations/ER visits since last assessment:  1, pain    Med rec/DUR performed: yes  Med Rec Discrepancies: no    Spoke with Apoorva and Casey today. They are traveling through [place] currently. Casey is doing much better this month. All his previous problems seems to have resolved. He is currently using Kineret to keep his gout under controlled. They are still setting up meds together and Casey have not missed any doses. They are aware of the changes to Casey's dosing and just had lab today. They will adjust when they get results. They do not need any medication filled at the moment. If there are changes,  they till fill at local pharmacy during their travel. Otherwise they will contact [facility] pharmacy to delivery to their location.    BP: not checking at home often - in clinic too much. BP is getting checked in clinic and have been mostly at goal.    No other questions or concerns. Follow up in 3 months.    Flakito Roman, Pharm D  [facility]

## 2023-10-25 ENCOUNTER — TELEPHONE (OUTPATIENT)
Dept: TRANSPLANT | Facility: CLINIC | Age: 68
End: 2023-10-25
Payer: MEDICARE

## 2023-10-25 NOTE — TELEPHONE ENCOUNTER
Nabila called regarding lab results from 10/24/23. Wife called to see if the results were faxed and in the chart patient had them drawn in [place].

## 2023-10-27 ENCOUNTER — TRANSFERRED RECORDS (OUTPATIENT)
Dept: HEALTH INFORMATION MANAGEMENT | Facility: CLINIC | Age: 68
End: 2023-10-27
Payer: MEDICARE

## 2023-10-30 ENCOUNTER — TELEPHONE (OUTPATIENT)
Dept: TRANSPLANT | Facility: CLINIC | Age: 68
End: 2023-10-30
Payer: MEDICARE

## 2023-10-30 DIAGNOSIS — Z94.0 KIDNEY REPLACED BY TRANSPLANT: ICD-10-CM

## 2023-10-30 DIAGNOSIS — Z94.4 LIVER TRANSPLANT RECIPIENT (H): ICD-10-CM

## 2023-10-30 RX ORDER — TACROLIMUS 0.5 MG/1
0.5 CAPSULE ORAL PRN
Qty: 180 CAPSULE | Refills: 3 | Status: SHIPPED | OUTPATIENT
Start: 2023-10-30 | End: 2023-11-22

## 2023-10-30 RX ORDER — TACROLIMUS 1 MG/1
3 CAPSULE ORAL 2 TIMES DAILY
Qty: 540 CAPSULE | Refills: 3 | Status: SHIPPED | OUTPATIENT
Start: 2023-10-30 | End: 2023-11-08

## 2023-10-30 NOTE — TELEPHONE ENCOUNTER
ISSUE:   Tacrolimus IR level 7.4 on 10/27, goal 8-10, dose 2.5 mg BID.    PLAN:   Please call patient and confirm this was an accurate 12-hour trough. Verify Tacrolimus IR dose 2.5 mg BID. Confirm no new medications or illness. Confirm no missed doses. If accurate trough and accurate dose, increase Tacrolimus IR dose to 3 mg BID and repeat labs in 1 weeks.    OUTCOME:   Spoke with patient, they confirm accurate trough level and current dose 2.5 mg BID. Patient confirmed dose change to 3 mg BID and to repeat labs in 1 weeks. Orders sent to preferred pharmacy for dose change and lab for repeat labs. Patient voiced understanding of plan.     Order for tacrolimus level entered.

## 2023-11-06 ASSESSMENT — PAIN SCALES - GENERAL: PAINLEVEL: NO PAIN (0)

## 2023-11-07 ENCOUNTER — LAB (OUTPATIENT)
Dept: LAB | Facility: CLINIC | Age: 68
End: 2023-11-07
Payer: COMMERCIAL

## 2023-11-07 ENCOUNTER — VIRTUAL VISIT (OUTPATIENT)
Dept: TRANSPLANT | Facility: CLINIC | Age: 68
End: 2023-11-07
Attending: INTERNAL MEDICINE
Payer: COMMERCIAL

## 2023-11-07 VITALS
WEIGHT: 225 LBS | DIASTOLIC BLOOD PRESSURE: 83 MMHG | SYSTOLIC BLOOD PRESSURE: 127 MMHG | BODY MASS INDEX: 35.31 KG/M2 | HEIGHT: 67 IN

## 2023-11-07 DIAGNOSIS — Z94.4 LIVER REPLACED BY TRANSPLANT (H): ICD-10-CM

## 2023-11-07 DIAGNOSIS — Z94.0 HISTORY OF KIDNEY TRANSPLANT: ICD-10-CM

## 2023-11-07 DIAGNOSIS — K70.30 ALCOHOLIC CIRRHOSIS (H): ICD-10-CM

## 2023-11-07 DIAGNOSIS — D63.8 ANEMIA IN OTHER CHRONIC DISEASES CLASSIFIED ELSEWHERE: ICD-10-CM

## 2023-11-07 DIAGNOSIS — L40.50 PSORIATIC ARTHRITIS (H): ICD-10-CM

## 2023-11-07 DIAGNOSIS — Z94.4 HISTORY OF LIVER TRANSPLANT (H): ICD-10-CM

## 2023-11-07 DIAGNOSIS — Z94.0 HTN, KIDNEY TRANSPLANT RELATED: ICD-10-CM

## 2023-11-07 DIAGNOSIS — Z94.4 LIVER TRANSPLANT RECIPIENT (H): ICD-10-CM

## 2023-11-07 DIAGNOSIS — Z48.298 AFTERCARE FOLLOWING ORGAN TRANSPLANT: ICD-10-CM

## 2023-11-07 DIAGNOSIS — Z79.899 ENCOUNTER FOR LONG-TERM CURRENT USE OF MEDICATION: ICD-10-CM

## 2023-11-07 DIAGNOSIS — D84.9 IMMUNOSUPPRESSED STATUS (H): ICD-10-CM

## 2023-11-07 DIAGNOSIS — I15.1 HTN, KIDNEY TRANSPLANT RELATED: ICD-10-CM

## 2023-11-07 DIAGNOSIS — Z94.0 KIDNEY REPLACED BY TRANSPLANT: ICD-10-CM

## 2023-11-07 DIAGNOSIS — I77.82 ANCA-ASSOCIATED VASCULITIS (H): ICD-10-CM

## 2023-11-07 DIAGNOSIS — G62.9 NEUROPATHY: ICD-10-CM

## 2023-11-07 DIAGNOSIS — Z94.0 KIDNEY REPLACED BY TRANSPLANT: Primary | ICD-10-CM

## 2023-11-07 DIAGNOSIS — I48.21 PERMANENT ATRIAL FIBRILLATION WITH RAPID VENTRICULAR RESPONSE (H): ICD-10-CM

## 2023-11-07 DIAGNOSIS — M1A.9XX1 TOPHACEOUS GOUT: ICD-10-CM

## 2023-11-07 PROBLEM — M79.89 SWELLING OF LIMB: Status: RESOLVED | Noted: 2023-09-27 | Resolved: 2023-11-07

## 2023-11-07 PROBLEM — I48.0 PAF (PAROXYSMAL ATRIAL FIBRILLATION) (H): Status: RESOLVED | Noted: 2022-12-07 | Resolved: 2023-11-07

## 2023-11-07 PROBLEM — M06.4 INFLAMMATORY POLYARTHROPATHY (H): Status: RESOLVED | Noted: 2023-09-29 | Resolved: 2023-11-07

## 2023-11-07 PROBLEM — R29.898 MUSCULAR DECONDITIONING: Status: RESOLVED | Noted: 2023-06-25 | Resolved: 2023-11-07

## 2023-11-07 LAB
ALBUMIN MFR UR ELPH: 26.8 MG/DL
ALBUMIN SERPL BCG-MCNC: 3.4 G/DL (ref 3.5–5.2)
ALBUMIN UR-MCNC: ABNORMAL MG/DL
ALP SERPL-CCNC: 154 U/L (ref 40–129)
ALT SERPL W P-5'-P-CCNC: 37 U/L (ref 0–70)
ANION GAP SERPL CALCULATED.3IONS-SCNC: 13 MMOL/L (ref 7–15)
APPEARANCE UR: CLEAR
AST SERPL W P-5'-P-CCNC: 28 U/L (ref 0–45)
BACTERIA #/AREA URNS HPF: ABNORMAL /HPF
BASOPHILS # BLD AUTO: 0 10E3/UL (ref 0–0.2)
BASOPHILS NFR BLD AUTO: 0 %
BILIRUB DIRECT SERPL-MCNC: <0.2 MG/DL (ref 0–0.3)
BILIRUB SERPL-MCNC: 0.4 MG/DL
BILIRUB UR QL STRIP: ABNORMAL
BUN SERPL-MCNC: 17.1 MG/DL (ref 8–23)
CALCIUM SERPL-MCNC: 9.3 MG/DL (ref 8.8–10.2)
CHLORIDE SERPL-SCNC: 104 MMOL/L (ref 98–107)
COLOR UR AUTO: YELLOW
CREAT SERPL-MCNC: 1.06 MG/DL (ref 0.67–1.17)
CREAT UR-MCNC: 170 MG/DL
DEPRECATED HCO3 PLAS-SCNC: 23 MMOL/L (ref 22–29)
EGFRCR SERPLBLD CKD-EPI 2021: 77 ML/MIN/1.73M2
EOSINOPHIL # BLD AUTO: 0.1 10E3/UL (ref 0–0.7)
EOSINOPHIL NFR BLD AUTO: 2 %
ERYTHROCYTE [DISTWIDTH] IN BLOOD BY AUTOMATED COUNT: 17.6 % (ref 10–15)
GLUCOSE SERPL-MCNC: 103 MG/DL (ref 70–99)
GLUCOSE UR STRIP-MCNC: NEGATIVE MG/DL
HCT VFR BLD AUTO: 39.8 % (ref 40–53)
HGB BLD-MCNC: 12.2 G/DL (ref 13.3–17.7)
HGB UR QL STRIP: NEGATIVE
IMM GRANULOCYTES # BLD: 0.1 10E3/UL
IMM GRANULOCYTES NFR BLD: 3 %
KETONES UR STRIP-MCNC: NEGATIVE MG/DL
LEUKOCYTE ESTERASE UR QL STRIP: ABNORMAL
LYMPHOCYTES # BLD AUTO: 0.5 10E3/UL (ref 0.8–5.3)
LYMPHOCYTES NFR BLD AUTO: 19 %
MAGNESIUM SERPL-MCNC: 1.4 MG/DL (ref 1.7–2.3)
MCH RBC QN AUTO: 28.6 PG (ref 26.5–33)
MCHC RBC AUTO-ENTMCNC: 30.7 G/DL (ref 31.5–36.5)
MCV RBC AUTO: 93 FL (ref 78–100)
MONOCYTES # BLD AUTO: 0.3 10E3/UL (ref 0–1.3)
MONOCYTES NFR BLD AUTO: 10 %
MUCOUS THREADS #/AREA URNS LPF: PRESENT /LPF
MYCOPHENOLATE SERPL LC/MS/MS-MCNC: 0.55 MG/L (ref 1–3.5)
MYCOPHENOLATE-G SERPL LC/MS/MS-MCNC: 48.2 MG/L (ref 30–95)
NEUTROPHILS # BLD AUTO: 1.8 10E3/UL (ref 1.6–8.3)
NEUTROPHILS NFR BLD AUTO: 65 %
NITRATE UR QL: NEGATIVE
PH UR STRIP: 7 [PH] (ref 5–7)
PHOSPHATE SERPL-MCNC: 3.7 MG/DL (ref 2.5–4.5)
PLATELET # BLD AUTO: 185 10E3/UL (ref 150–450)
POTASSIUM SERPL-SCNC: 4.3 MMOL/L (ref 3.4–5.3)
PROT SERPL-MCNC: 5.6 G/DL (ref 6.4–8.3)
PROT/CREAT 24H UR: 0.16 MG/MG CR (ref 0–0.2)
RBC # BLD AUTO: 4.27 10E6/UL (ref 4.4–5.9)
RBC #/AREA URNS AUTO: ABNORMAL /HPF
SODIUM SERPL-SCNC: 140 MMOL/L (ref 135–145)
SP GR UR STRIP: 1.01 (ref 1–1.03)
TACROLIMUS BLD-MCNC: 13.1 UG/L (ref 5–15)
TME LAST DOSE: ABNORMAL H
TME LAST DOSE: ABNORMAL H
TME LAST DOSE: NORMAL H
TME LAST DOSE: NORMAL H
URATE SERPL-MCNC: 5.2 MG/DL (ref 3.4–7)
UROBILINOGEN UR STRIP-ACNC: 0.2 E.U./DL
WBC # BLD AUTO: 2.8 10E3/UL (ref 4–11)
WBC # BLD AUTO: 2.8 10E3/UL (ref 4–11)
WBC #/AREA URNS AUTO: ABNORMAL /HPF

## 2023-11-07 PROCEDURE — G0480 DRUG TEST DEF 1-7 CLASSES: HCPCS | Mod: 90

## 2023-11-07 PROCEDURE — 84156 ASSAY OF PROTEIN URINE: CPT | Mod: 59

## 2023-11-07 PROCEDURE — 80197 ASSAY OF TACROLIMUS: CPT

## 2023-11-07 PROCEDURE — 81001 URINALYSIS AUTO W/SCOPE: CPT | Mod: 59

## 2023-11-07 PROCEDURE — 82248 BILIRUBIN DIRECT: CPT

## 2023-11-07 PROCEDURE — 84550 ASSAY OF BLOOD/URIC ACID: CPT

## 2023-11-07 PROCEDURE — 87086 URINE CULTURE/COLONY COUNT: CPT

## 2023-11-07 PROCEDURE — 99215 OFFICE O/P EST HI 40 MIN: CPT | Mod: VID | Performed by: INTERNAL MEDICINE

## 2023-11-07 PROCEDURE — 80180 DRUG SCRN QUAN MYCOPHENOLATE: CPT

## 2023-11-07 PROCEDURE — 36415 COLL VENOUS BLD VENIPUNCTURE: CPT

## 2023-11-07 PROCEDURE — 80053 COMPREHEN METABOLIC PANEL: CPT

## 2023-11-07 PROCEDURE — 84100 ASSAY OF PHOSPHORUS: CPT

## 2023-11-07 PROCEDURE — 85025 COMPLETE CBC W/AUTO DIFF WBC: CPT | Mod: QW

## 2023-11-07 PROCEDURE — 83540 ASSAY OF IRON: CPT

## 2023-11-07 PROCEDURE — 2894A MYELOPEROXIDASE AND PROTEINASE 3 PANEL: CPT | Mod: 59

## 2023-11-07 PROCEDURE — 83735 ASSAY OF MAGNESIUM: CPT

## 2023-11-07 PROCEDURE — 83876 ASSAY MYELOPEROXIDASE: CPT

## 2023-11-07 PROCEDURE — 83550 IRON BINDING TEST: CPT

## 2023-11-07 PROCEDURE — 83516 IMMUNOASSAY NONANTIBODY: CPT | Mod: 59

## 2023-11-07 PROCEDURE — 82728 ASSAY OF FERRITIN: CPT

## 2023-11-07 RX ORDER — METOPROLOL TARTRATE 50 MG
50 TABLET ORAL 2 TIMES DAILY
Qty: 180 TABLET | Refills: 3 | Status: SHIPPED | OUTPATIENT
Start: 2023-11-07 | End: 2024-08-20

## 2023-11-07 RX ORDER — PREDNISONE 5 MG/1
5 TABLET ORAL DAILY
Qty: 135 TABLET | Refills: 3 | COMMUNITY
Start: 2023-11-07 | End: 2024-01-03

## 2023-11-07 RX ORDER — GABAPENTIN 100 MG/1
100 CAPSULE ORAL 3 TIMES DAILY
Qty: 270 CAPSULE | Refills: 3 | Status: SHIPPED | OUTPATIENT
Start: 2023-11-07 | End: 2024-03-25

## 2023-11-07 NOTE — PATIENT INSTRUCTIONS
Patient Recommendations:  - Stop phosphorous supplement  -Let us know when you decrease prednisone to 5mg daily. I would like for you to remain on this dose indefinitely   -Start gabapentin at 100mg three times daily and if it makes you tired decrease it to 100mg at bedtime.   -I will refer you to neurology  -Increase metoprolol to 50mg twice daily      Transplant Patient Information  Your Post Transplant Coordinator is: Angelita Torres  For non urgent items, we encourage you to contact your coordinator/care team online via [employer]  You and your care team can also contact your transplant coordinator Monday - Friday, 8am - 5pm at [phone] (Option 2 to reach the coordinator or Option 4 to schedule an appointment).  After hours for urgent matters, please call [facility] at [phone].

## 2023-11-07 NOTE — LETTER
11/7/2023         RE: Damion Quinones   [address] [facility] [address]        Dear Colleague,    Thank you for referring your patient, Damion Quinones, to the [facility]. Please see a copy of my visit note below.    TRANSPLANT NEPHROLOGY EARLY POST TRANSPLANT VISIT    Assessment & Plan   # DDKT (SLK): Stable   - Baseline Creatinine: ~ 0.8-1   - Proteinuria: Minimal (0.2-0.5 grams)   - Date DSA Last Checked: Aug/2023      Latest DSA: No but had DSA to Cq9 w/  at txp   - BK Viremia: No   - Kidney Tx Biopsy: Jun 20, 2023; Result: negative for rejection. Focal ATN   - Transplant Ureteral Stent: Removed      # Liver Tx (SLK): Stable liver graft function. Followed by transplant surgery.     # Immunosuppression: Tacrolimus immediate release (goal 8-10), Mycophenolic acid (dose 540 mg every 12 hours) and Prednisone (dose 7.5 mg daily)   - Induction with Recent Transplant:   rATG total 2mg/kg, stopped 2/2 sepsis   - Continue with intensive monitoring of immunosuppression for efficacy and toxicity.   - Changes: Not at this time. Awaiting rheumatologist to decrease prednisone down to 5mg daily     # Infection Prophylaxis:   - PJP: Sulfa/TMP (Bactrim)  - CMV: None, prophylaxis completed    # Hypertension: Controlled but tachycardic;  Goal BP: < 130/80   - Volume status: Euvolemic     - Changes: Yes - Increase metoprolol to 50mg bid.     # Steroid induced hyperglycemia: Controlled (HbA1c <7%) Last HbA1c: 5.2%   - Management as per primary care.    # Anemia in Chronic Renal Disease: Hgb: Trend up      FEDERICO: Yes but holding for Hb>10   - Iron studies: Low iron saturation. Received injectafer on 9/6 and 9/13    # Mineral Bone Disorder:   - Secondary renal hyperparathyroidism; PTH level: Normal (15-65 pg/ml)        On treatment: None  - Vitamin D; level: Normal        On supplement: No  - Calcium; level: Normal        On supplement: No  - Phosphorus; level: Normal        On supplement: Yes,  stop     # Electrolytes:   - Potassium; level: Normal        On supplement: No  - Magnesium; level: Stable low        On supplement: Yes, mag ox 800mg bid  - Bicarbonate; level: Normal        On supplement: No    # Neuropathy:   -Obtain B12, anti treponemal ab, SPEP w/ ifx, UPEP w/ ifx, K/L. HCV neg 7/31/23   -Refer to neurology   -Gabapenin 100mg tid. If that makes him tired decrease to 100mg at bedtime     # Paroxysmal A-Fib:    -Stable on metoprolol and anticoagulated on apixaban     # ANCA Vasculitis: S/p Rituximab x2              - U/A negative on 10/27/23     # Gout: On prednisone 10 mg PO daily PTA. Currently on prednisone 7.5mg daily and titrating down to 5mg daily with rheumatology.   -Uric acid 5              -Continue allopurinol 300mg daily.    -Takes anakinra 100mg with gout symptoms.               -Follows with rheumatology at [facility], Dr. Rucker.     # C.diff:   -tested positive 9/25/23. Treated with PO vanc taper that ended today. 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    Return visit: Return in 7 weeks (on 12/28/2023).    Subhash Dutta MD    I spent 55 minutes on the date of the encounter doing chart review, review of  outside records, review of test results, interpretation of tests, patient visit, documentation and discussion with family      Chief Complaint   Mr. Quinones is a 68 year old here for kidney transplant, immunosuppression management and hospital follow up after kidney transplant.     History of Present Illness   Casey was diagnosed with C.diff on 9/26 and started on PO vancomycin for treatment. He was admitted 9/27-9/30/23 for swelling of upper and lower extremities, diagnosed with gout flare. He was started on anakinra 100mg subcutaneous x3 doses. He has been on prednisone 7.5mg since 10/24. He sees his rheumatologist tomorrow and expects to decrease prednisone to 5mg daily next week. He only takes anakinra PRN and takes it for 3 days at a time.     He continues to have neuropathy of feet and hands that is painful. He also has numbness. He also has itching combined with this.     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)     HTN, kidney transplant related     Hereditary hemochromatosis (H24)     Other hyperlipidemia     Tophaceous gout     Deep vein thrombosis (DVT) of non-extremity vein, unspecified chronicity     CKD (chronic kidney disease), stage II     Chronic atrial fibrillation (H)     Liver replaced by transplant (H)     Immunosuppressed status (H24)     Kidney replaced by transplant     CAD (coronary artery disease)     Steroid-induced hyperglycemia     Muscular deconditioning     Aftercare following organ transplant     Anemia in stage 2 chronic kidney disease     HCC (hepatocellular carcinoma) (H)     Low serum phosphate     Swelling of limb     Status post liver transplantation (H)     Inflammatory polyarthropathy (H)       Allergies   No Known Allergies    Medications   Current Outpatient Medications    Medication Sig     Alcohol Swabs PADS Use to swab the area of the injection or quyen as directed Per insurance coverage     allopurinol (ZYLOPRIM) 100 MG tablet Take 300 mg by mouth daily     anakinra (KINERET) 100 MG/0.67ML SOSY injection Inject 0.67 mLs (100 mg) Subcutaneous daily as needed     apixaban ANTICOAGULANT (ELIQUIS ANTICOAGULANT) 5 MG tablet Take 1 tablet (5 mg) by mouth 2 times daily     atorvastatin (LIPITOR) 20 MG tablet Take 1 tablet (20 mg) by mouth every evening     blood glucose (NO BRAND SPECIFIED) lancets standard To use to test glucose level in the blood Use to test blood sugar  4  times daily as directed. To accompany glucose monitor brands per insurance coverage.     blood glucose (NO BRAND SPECIFIED) test strip Use to test blood sugar 3 times daily or as directed.     blood glucose (NO BRAND SPECIFIED) test strip To use to test glucose level in the blood Use to test blood sugar  4 times daily as directed. To accompany glucose monitor brands per insurance coverage.     blood glucose monitoring (NO BRAND SPECIFIED) meter device kit Use as directed Per insurance coverage     gabapentin (NEURONTIN) 100 MG capsule Take 1 capsule (100 mg) by mouth 3 times daily for 90 days     levothyroxine (SYNTHROID/LEVOTHROID) 88 MCG tablet Take 1 tablet (88 mcg) by mouth daily     magnesium oxide (MAG-OX) 400 MG tablet Take 2 tablets (800 mg) by mouth 2 times daily     metoprolol tartrate (LOPRESSOR) 50 MG tablet Take 1 tablet (50 mg) by mouth 2 times daily for 90 days     multivitamin w/minerals (THERA-VIT-M) tablet Take 1 tablet by mouth daily     MYFORTIC (BRAND) 180 MG EC tablet Take 3 tablets (540 mg) by mouth 2 times daily     pantoprazole (PROTONIX) 40 MG EC tablet Take 1 tablet (40 mg) by mouth every morning (before breakfast)     predniSONE (DELTASONE) 5 MG tablet Take 1.5 tablets (7.5 mg) by mouth daily     Sharps Container MISC Use as directed to dispose of needles, lancets and other sharps      "simethicone (MYLICON) 125 MG chewable tablet Take 125 mg by mouth 4 times daily as needed for intestinal gas     sulfamethoxazole-trimethoprim (BACTRIM) 400-80 MG tablet Take 1 tablet by mouth daily     tacrolimus (GENERIC) 1 MG capsule Take 3 capsules (3 mg) by mouth 2 times daily     ursodiol (ACTIGALL) 250 MG tablet Take 1 tablet (250 mg) by mouth 2 times daily     anakinra (KINERET) 100 MG/0.67ML SOSY injection Inject 0.67 mLs (100 mg) Subcutaneous daily for 5 days     tacrolimus (GENERIC) 0.5 MG capsule Take 1 capsule (0.5 mg) by mouth as needed (For dose changes) For dose changes. (Patient not taking: Reported on 11/7/2023)     No current facility-administered medications for this visit.     Medications Discontinued During This Encounter   Medication Reason     predniSONE (DELTASONE) 5 MG tablet      oxyCODONE (ROXICODONE) 5 MG tablet      phosphorus tablet 250 mg (PHOSPHA 250 NEUTRAL) 250 MG per tablet      vancomycin (VANCOCIN) 125 MG capsule      metoprolol tartrate (LOPRESSOR) 25 MG tablet        Physical Exam   Vital Signs: /83   Ht 1.702 m (5' 7\")   Wt 102.1 kg (225 lb)   BMI 35.24 kg/m      GENERAL APPEARANCE: alert and no distress  HENT: no obvious abnormalities on appearance  RESP: breathing appears unremarkable with normal rate, no audible wheezing or cough and no apparent shortness of breath with conversation  MS: extremities normal - no gross deformities noted  SKIN: no apparent rash and normal skin tone  NEURO: speech is clear with no obvious neurological deficits  PSYCH: mentation appears normal and affect normal    Data         Latest Ref Rng & Units 10/27/2023     9:11 AM 10/10/2023     7:57 AM 10/5/2023     7:52 AM   Renal   Sodium 135 - 145 mmol/L  140  140    Na (external) 136.00 - 145.00 mmol/L 139.00      K 3.4 - 5.3 mmol/L  4.1  4.1    K (external) 3.50 - 5.10 mmol/L 3.90      Cl 98.00 - 110.00 mmol/L 105.00  103  102    Cl (external) 98.00 - 110.00 mmol/L 105.00  103  102    CO2 " 22 - 29 mmol/L  25  24    CO2 (external) 21.00 - 32.00 mmol/L 26.00      Urea Nitrogen 8.0 - 23.0 mg/dL  16.5  19.2    BUN (external) 7.00 - 18.00 mg/dL 14.00      Creatinine 0.67 - 1.17 mg/dL  0.97  0.94    Cr (external) 0.70 - 1.30 mg/dL 0.97      Glucose 70 - 99 mg/dL  100  106    Glucose (external) 7.00 - 99.00 mg/dL 111.00      Calcium 8.8 - 10.2 mg/dL  9.5  9.5    Ca (external) 8.70 - 10.40 mg/dL 9.60      Magnesium 1.7 - 2.3 mg/dL  1.6  1.7    Mg (external) 1.60 - 2.60 mg/dL 1.50            Latest Ref Rng & Units 10/27/2023     9:11 AM 10/27/2023     8:21 AM 10/24/2023     8:06 AM   Bone Health   Phos (external) 2.5 - 4.9 Mg/dL 3.5      PTHi (external) 18.4 - 80.1 pg/mL  45.0  42.3           This result is from an external source.         Latest Ref Rng & Units 10/27/2023     8:21 AM 10/10/2023     7:57 AM 10/5/2023     7:52 AM   Heme   WBC 4.0 - 11.0 10e3/uL  4.0 - 11.0 10e3/uL  5.5     5.5  4.8     5.1    WBC (external) 4.00 - 10.80 K/mcL 2.98      Hgb 13.3 - 17.7 g/dL  11.8  11.9    Hgb (external) 14.0 - 18.0 gm/dL 12.7      Plt 150 - 450 10e3/uL  207  247    Plt (external) 130 - 400 K/mcL 157      ABSOLUTE NEUTROPHIL 1.6 - 8.3 10e3/uL   4.1    ABSOLUTE LYMPHOCYTES 0.8 - 5.3 10e3/uL   0.5    ABSOLUTE MONOCYTES 0.0 - 1.3 10e3/uL   0.3    ABSOLUTE EOSINOPHILS 0.0 - 0.7 10e3/uL   0.2          Latest Ref Rng & Units 10/27/2023     9:11 AM 10/10/2023     7:57 AM 10/5/2023     7:52 AM   Liver   AP 40 - 129 U/L  193  240    AP (external) 45.00 - 117.00 Unit/L 173.00      TBili <=1.2 mg/dL  0.4  0.4    TBili (external) <=1.20 mg/dL 0.82      Bilirubin Direct 0.00 - 0.30 mg/dL  <0.20  <0.20    DBili (external) 0.00 - 0.30 mg/dL 0.40      ALT 0 - 70 U/L  30  52    ALT (external) 6.00 - 61.00 Unit/L 40.00      AST 0 - 45 U/L  19  31    AST (external) 15.00 - 37.00 Unit/L 27.00      Tot Protein 6.4 - 8.3 g/dL  5.8  6.1    Tot Protein (external) 6.40 - 8.20 gm/dL 6.30      Albumin 3.5 - 5.2 g/dL  3.6  3.7    Albumin  (external) 3.40 - 5.00 gm/dL 4.10            Latest Ref Rng & Units 9/20/2023     4:11 PM 6/7/2023     3:30 AM 6/6/2023     9:08 PM   Pancreas   A1C 0.0 - 5.6 % 5.2   5.1    Amylase 28 - 100 U/L  90     Lipase (Roche) 13 - 60 U/L  59           Latest Ref Rng & Units 10/5/2023     7:52 AM 9/5/2023     7:02 AM 8/7/2023     7:26 AM   Iron studies   Iron 61 - 157 ug/dL 83  32  28    Iron Sat Index 15 - 46 % 34  12  11    Ferritin 31 - 409 ng/mL 919  485  440          Latest Ref Rng & Units 10/27/2023     8:21 AM 10/24/2023     8:06 AM 9/27/2023    11:52 AM   [facility] Virology   BK Quant Log Ext log IU/mL Undetected  Undetected     BK Quant Result Ext Undetected IU/mL Undetected  Undetected     BK Quant Spec Ext   Plasma     EBV DNA COPIES/ML Not Detected copies/mL   Not Detected         Recent Labs   Lab Test 09/28/23  0605 09/29/23  0520 09/30/23  0548 10/05/23  0752 10/10/23  0757   DOSTAC 9/27/2023  --   --  10/4/2023 10/9/2023   TACROL 14.7   < > 12.6 6.0 10.6    < > = values in this interval not displayed.     Recent Labs   Lab Test 08/29/23  1014 09/18/23  0806 10/05/23  0752   DOSMPA 8/28/2023   9:00 PM 9/17/2023   8:00 PM  --    MPACID 1.38 2.23 0.91*   MPAG 73.6 57.8 36.2     Virtual Visit Details    Type of service:  Video Visit   Video Start Time: 1:29 PM  Video End Time:2:05 PM    Originating Location (pt. Location): Home  Distant Location (provider location):  On-site  Platform used for Video Visit: Kwaku      Again, thank you for allowing me to participate in the care of your patient.        Sincerely,        Subhash Dutta MD

## 2023-11-07 NOTE — PROGRESS NOTES
Virtual Visit Details    Type of service:  Video Visit   Video Start Time: 1:29 PM  Video End Time:2:05 PM    Originating Location (pt. Location): Home  Distant Location (provider location):  On-site  Platform used for Video Visit: Kwaku

## 2023-11-07 NOTE — PROGRESS NOTES
TRANSPLANT NEPHROLOGY EARLY POST TRANSPLANT VISIT    Assessment & Plan   # DDKT (K): Stable   - Baseline Creatinine: ~ 0.8-1   - Proteinuria: Minimal (0.2-0.5 grams)   - Date DSA Last Checked: Aug/2023      Latest DSA: No but had DSA to Cq9 w/  at txp   - BK Viremia: No   - Kidney Tx Biopsy: Jun 20, 2023; Result: negative for rejection. Focal ATN   - Transplant Ureteral Stent: Removed      # Liver Tx (K): Stable liver graft function. Followed by transplant surgery.     # Immunosuppression: Tacrolimus immediate release (goal 8-10), Mycophenolic acid (dose 540 mg every 12 hours) and Prednisone (dose 7.5 mg daily)   - Induction with Recent Transplant:   rATG total 2mg/kg, stopped 2/2 sepsis   - Continue with intensive monitoring of immunosuppression for efficacy and toxicity.   - Changes: Not at this time. Awaiting rheumatologist to decrease prednisone down to 5mg daily     # Infection Prophylaxis:   - PJP: Sulfa/TMP (Bactrim)  - CMV: None, prophylaxis completed    # Hypertension: Controlled but tachycardic;  Goal BP: < 130/80   - Volume status: Euvolemic     - Changes: Yes - Increase metoprolol to 50mg bid.     # Steroid induced hyperglycemia: Controlled (HbA1c <7%) Last HbA1c: 5.2%   - Management as per primary care.    # Anemia in Chronic Renal Disease: Hgb: Trend up      FEDERICO: Yes but holding for Hb>10   - Iron studies: Low iron saturation. Received injectafer on 9/6 and 9/13    # Mineral Bone Disorder:   - Secondary renal hyperparathyroidism; PTH level: Normal (15-65 pg/ml)        On treatment: None  - Vitamin D; level: Normal        On supplement: No  - Calcium; level: Normal        On supplement: No  - Phosphorus; level: Normal        On supplement: Yes, stop     # Electrolytes:   - Potassium; level: Normal        On supplement: No  - Magnesium; level: Stable low        On supplement: Yes, mag ox 800mg bid  - Bicarbonate; level: Normal        On supplement: No    # Neuropathy:   -Obtain B12, anti  treponemal ab, SPEP w/ ifx, UPEP w/ ifx, K/L. HCV neg 7/31/23   -Refer to neurology   -Gabapenin 100mg tid. If that makes him tired decrease to 100mg at bedtime     # Paroxysmal A-Fib:    -Stable on metoprolol and anticoagulated on apixaban     # ANCA Vasculitis: S/p Rituximab x2              - U/A negative on 10/27/23     # Gout: On prednisone 10 mg PO daily PTA. Currently on prednisone 7.5mg daily and titrating down to 5mg daily with rheumatology.   -Uric acid 5              -Continue allopurinol 300mg daily.    -Takes anakinra 100mg with gout symptoms.               -Follows with [facility]ology at [facility], Dr. Rucker.     # C.diff:   -tested positive 9/25/23. Treated with PO vanc taper that ended today. 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    Return visit: Return in 7 weeks (on 12/28/2023).    Subhash Dutta MD    I spent 55 minutes on the date of the encounter doing chart review, review of outside records, review of test results, interpretation of tests, patient visit, documentation and discussion with family      Chief Complaint   Mr. Quinones is a 68 year old here for kidney transplant, immunosuppression management and hospital follow  up after kidney transplant.     History of Present Illness   Casey was diagnosed with C.diff on 9/26 and started on PO vancomycin for treatment. He was admitted 9/27-9/30/23 for swelling of upper and lower extremities, diagnosed with gout flare. He was started on anakinra 100mg subcutaneous x3 doses. He has been on prednisone 7.5mg since 10/24. He sees his rheumatologist tomorrow and expects to decrease prednisone to 5mg daily next week. He only takes anakinra PRN and takes it for 3 days at a time.     He continues to have neuropathy of feet and hands that is painful. He also has numbness. He also has itching combined with this.     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)     HTN, kidney transplant related     Hereditary hemochromatosis (H24)     Other hyperlipidemia     Tophaceous gout     Deep vein thrombosis (DVT) of non-extremity vein, unspecified chronicity     CKD (chronic kidney disease), stage II     Chronic atrial fibrillation (H)     Liver replaced by transplant (H)     Immunosuppressed status (H24)     Kidney replaced by transplant     CAD (coronary artery disease)     Steroid-induced hyperglycemia     Muscular deconditioning     Aftercare following organ transplant     Anemia in stage 2 chronic kidney disease     HCC (hepatocellular carcinoma) (H)     Low serum phosphate     Swelling of limb     Status post liver transplantation (H)     Inflammatory polyarthropathy (H)       Allergies   No Known Allergies    Medications   Current Outpatient Medications   Medication Sig     Alcohol Swabs PADS Use to swab the area of the injection or quyen as directed Per insurance coverage     allopurinol (ZYLOPRIM) 100 MG tablet Take 300 mg by mouth daily     anakinra (KINERET) 100 MG/0.67ML SOSY injection  Inject 0.67 mLs (100 mg) Subcutaneous daily as needed     apixaban ANTICOAGULANT (ELIQUIS ANTICOAGULANT) 5 MG tablet Take 1 tablet (5 mg) by mouth 2 times daily     atorvastatin (LIPITOR) 20 MG tablet Take 1 tablet (20 mg) by mouth every evening     blood glucose (NO BRAND SPECIFIED) lancets standard To use to test glucose level in the blood Use to test blood sugar  4  times daily as directed. To accompany glucose monitor brands per insurance coverage.     blood glucose (NO BRAND SPECIFIED) test strip Use to test blood sugar 3 times daily or as directed.     blood glucose (NO BRAND SPECIFIED) test strip To use to test glucose level in the blood Use to test blood sugar  4 times daily as directed. To accompany glucose monitor brands per insurance coverage.     blood glucose monitoring (NO BRAND SPECIFIED) meter device kit Use as directed Per insurance coverage     gabapentin (NEURONTIN) 100 MG capsule Take 1 capsule (100 mg) by mouth 3 times daily for 90 days     levothyroxine (SYNTHROID/LEVOTHROID) 88 MCG tablet Take 1 tablet (88 mcg) by mouth daily     magnesium oxide (MAG-OX) 400 MG tablet Take 2 tablets (800 mg) by mouth 2 times daily     metoprolol tartrate (LOPRESSOR) 50 MG tablet Take 1 tablet (50 mg) by mouth 2 times daily for 90 days     multivitamin w/minerals (THERA-VIT-M) tablet Take 1 tablet by mouth daily     MYFORTIC (BRAND) 180 MG EC tablet Take 3 tablets (540 mg) by mouth 2 times daily     pantoprazole (PROTONIX) 40 MG EC tablet Take 1 tablet (40 mg) by mouth every morning (before breakfast)     predniSONE (DELTASONE) 5 MG tablet Take 1.5 tablets (7.5 mg) by mouth daily     Sharps Container MISC Use as directed to dispose of needles, lancets and other sharps     simethicone (MYLICON) 125 MG chewable tablet Take 125 mg by mouth 4 times daily as needed for intestinal gas     sulfamethoxazole-trimethoprim (BACTRIM) 400-80 MG tablet Take 1 tablet by mouth daily     tacrolimus (GENERIC) 1 MG capsule Take 3  "capsules (3 mg) by mouth 2 times daily     ursodiol (ACTIGALL) 250 MG tablet Take 1 tablet (250 mg) by mouth 2 times daily     anakinra (KINERET) 100 MG/0.67ML SOSY injection Inject 0.67 mLs (100 mg) Subcutaneous daily for 5 days     tacrolimus (GENERIC) 0.5 MG capsule Take 1 capsule (0.5 mg) by mouth as needed (For dose changes) For dose changes. (Patient not taking: Reported on 11/7/2023)     No current facility-administered medications for this visit.     Medications Discontinued During This Encounter   Medication Reason     predniSONE (DELTASONE) 5 MG tablet      oxyCODONE (ROXICODONE) 5 MG tablet      phosphorus tablet 250 mg (PHOSPHA 250 NEUTRAL) 250 MG per tablet      vancomycin (VANCOCIN) 125 MG capsule      metoprolol tartrate (LOPRESSOR) 25 MG tablet        Physical Exam   Vital Signs: /83   Ht 1.702 m (5' 7\")   Wt 102.1 kg (225 lb)   BMI 35.24 kg/m      GENERAL APPEARANCE: alert and no distress  HENT: no obvious abnormalities on appearance  RESP: breathing appears unremarkable with normal rate, no audible wheezing or cough and no apparent shortness of breath with conversation  MS: extremities normal - no gross deformities noted  SKIN: no apparent rash and normal skin tone  NEURO: speech is clear with no obvious neurological deficits  PSYCH: mentation appears normal and affect normal    Data         Latest Ref Rng & Units 10/27/2023     9:11 AM 10/10/2023     7:57 AM 10/5/2023     7:52 AM   Renal   Sodium 135 - 145 mmol/L  140  140    Na (external) 136.00 - 145.00 mmol/L 139.00      K 3.4 - 5.3 mmol/L  4.1  4.1    K (external) 3.50 - 5.10 mmol/L 3.90      Cl 98.00 - 110.00 mmol/L 105.00  103  102    Cl (external) 98.00 - 110.00 mmol/L 105.00  103  102    CO2 22 - 29 mmol/L  25  24    CO2 (external) 21.00 - 32.00 mmol/L 26.00      Urea Nitrogen 8.0 - 23.0 mg/dL  16.5  19.2    BUN (external) 7.00 - 18.00 mg/dL 14.00      Creatinine 0.67 - 1.17 mg/dL  0.97  0.94    Cr (external) 0.70 - 1.30 mg/dL " 0.97      Glucose 70 - 99 mg/dL  100  106    Glucose (external) 7.00 - 99.00 mg/dL 111.00      Calcium 8.8 - 10.2 mg/dL  9.5  9.5    Ca (external) 8.70 - 10.40 mg/dL 9.60      Magnesium 1.7 - 2.3 mg/dL  1.6  1.7    Mg (external) 1.60 - 2.60 mg/dL 1.50            Latest Ref Rng & Units 10/27/2023     9:11 AM 10/27/2023     8:21 AM 10/24/2023     8:06 AM   Bone Health   Phos (external) 2.5 - 4.9 Mg/dL 3.5      PTHi (external) 18.4 - 80.1 pg/mL  45.0  42.3           This result is from an external source.         Latest Ref Rng & Units 10/27/2023     8:21 AM 10/10/2023     7:57 AM 10/5/2023     7:52 AM   Heme   WBC 4.0 - 11.0 10e3/uL  4.0 - 11.0 10e3/uL  5.5     5.5  4.8     5.1    WBC (external) 4.00 - 10.80 K/mcL 2.98      Hgb 13.3 - 17.7 g/dL  11.8  11.9    Hgb (external) 14.0 - 18.0 gm/dL 12.7      Plt 150 - 450 10e3/uL  207  247    Plt (external) 130 - 400 K/mcL 157      ABSOLUTE NEUTROPHIL 1.6 - 8.3 10e3/uL   4.1    ABSOLUTE LYMPHOCYTES 0.8 - 5.3 10e3/uL   0.5    ABSOLUTE MONOCYTES 0.0 - 1.3 10e3/uL   0.3    ABSOLUTE EOSINOPHILS 0.0 - 0.7 10e3/uL   0.2          Latest Ref Rng & Units 10/27/2023     9:11 AM 10/10/2023     7:57 AM 10/5/2023     7:52 AM   Liver   AP 40 - 129 U/L  193  240    AP (external) 45.00 - 117.00 Unit/L 173.00      TBili <=1.2 mg/dL  0.4  0.4    TBili (external) <=1.20 mg/dL 0.82      Bilirubin Direct 0.00 - 0.30 mg/dL  <0.20  <0.20    DBili (external) 0.00 - 0.30 mg/dL 0.40      ALT 0 - 70 U/L  30  52    ALT (external) 6.00 - 61.00 Unit/L 40.00      AST 0 - 45 U/L  19  31    AST (external) 15.00 - 37.00 Unit/L 27.00      Tot Protein 6.4 - 8.3 g/dL  5.8  6.1    Tot Protein (external) 6.40 - 8.20 gm/dL 6.30      Albumin 3.5 - 5.2 g/dL  3.6  3.7    Albumin (external) 3.40 - 5.00 gm/dL 4.10            Latest Ref Rng & Units 9/20/2023     4:11 PM 6/7/2023     3:30 AM 6/6/2023     9:08 PM   Pancreas   A1C 0.0 - 5.6 % 5.2   5.1    Amylase 28 - 100 U/L  90     Lipase (Roche) 13 - 60 U/L  59            Latest Ref Rng & Units 10/5/2023     7:52 AM 9/5/2023     7:02 AM 8/7/2023     7:26 AM   Iron studies   Iron 61 - 157 ug/dL 83  32  28    Iron Sat Index 15 - 46 % 34  12  11    Ferritin 31 - 409 ng/mL 919  485  440          Latest Ref Rng & Units 10/27/2023     8:21 AM 10/24/2023     8:06 AM 9/27/2023    11:52 AM   [facility] Virology   BK Quant Log Ext log IU/mL Undetected  Undetected     BK Quant Result Ext Undetected IU/mL Undetected  Undetected     BK Quant Spec Ext   Plasma     EBV DNA COPIES/ML Not Detected copies/mL   Not Detected         Recent Labs   Lab Test 09/28/23  0605 09/29/23  0520 09/30/23  0548 10/05/23  0752 10/10/23  0757   DOSTAC 9/27/2023  --   --  10/4/2023 10/9/2023   TACROL 14.7   < > 12.6 6.0 10.6    < > = values in this interval not displayed.     Recent Labs   Lab Test 08/29/23  1014 09/18/23  0806 10/05/23  0752   DOSMPA 8/28/2023   9:00 PM 9/17/2023   8:00 PM  --    MPACID 1.38 2.23 0.91*   MPAG 73.6 57.8 36.2

## 2023-11-07 NOTE — NURSING NOTE
Is the patient currently in the state of [place]? [place]  [place]  Visit mode:VIDEO    If the visit is dropped, the patient can be reconnected by: VIDEO VISIT: Send to e-mail at: [email or web address]    Will anyone else be joining the visit? YES: How would they like to receive their invitation? Text to cell phone: Wife  (If patient encounters technical issues they should call [phone]    How would you like to obtain your AVS? MyChart    Are changes needed to the allergy or medication list? No    Reason for visit: TRACY JOHNSON

## 2023-11-08 ENCOUNTER — PATIENT OUTREACH (OUTPATIENT)
Dept: CARE COORDINATION | Facility: CLINIC | Age: 68
End: 2023-11-08
Payer: MEDICARE

## 2023-11-08 ENCOUNTER — DOCUMENTATION ONLY (OUTPATIENT)
Dept: NEPHROLOGY | Facility: CLINIC | Age: 68
End: 2023-11-08
Payer: MEDICARE

## 2023-11-08 ENCOUNTER — TELEPHONE (OUTPATIENT)
Dept: TRANSPLANT | Facility: CLINIC | Age: 68
End: 2023-11-08
Payer: MEDICARE

## 2023-11-08 DIAGNOSIS — Z94.4 LIVER TRANSPLANT RECIPIENT (H): ICD-10-CM

## 2023-11-08 DIAGNOSIS — Z94.0 KIDNEY REPLACED BY TRANSPLANT: Primary | ICD-10-CM

## 2023-11-08 DIAGNOSIS — D63.8 ANEMIA IN OTHER CHRONIC DISEASES CLASSIFIED ELSEWHERE: Primary | ICD-10-CM

## 2023-11-08 DIAGNOSIS — Z94.0 KIDNEY REPLACED BY TRANSPLANT: ICD-10-CM

## 2023-11-08 LAB
BACTERIA UR CULT: NORMAL
FERRITIN SERPL-MCNC: 341 NG/ML (ref 31–409)
IRON BINDING CAPACITY (ROCHE): 226 UG/DL (ref 240–430)
IRON SATN MFR SERPL: 23 % (ref 15–46)
IRON SERPL-MCNC: 53 UG/DL (ref 61–157)

## 2023-11-08 RX ORDER — TACROLIMUS 1 MG/1
2 CAPSULE ORAL 2 TIMES DAILY
Qty: 360 CAPSULE | Refills: 3 | Status: SHIPPED | OUTPATIENT
Start: 2023-11-08 | End: 2023-11-22

## 2023-11-08 NOTE — PROGRESS NOTES
Subhash Dutta MD Issue MPA level   Repeat MPA level with next labs. WBC fairly low. Also obtain CMV PCR. I think WBC is low 2/2 anakinra use but not sure.    Updated the above orders per Dr Subhash Dutta           Latest Reference Range & Units 11/07/23 07:53   Mycophenolic Acid by Tandem Mass Spectrometry 1.00 - 3.50 mg/L 0.55 (L)

## 2023-11-08 NOTE — TELEPHONE ENCOUNTER
ISSUE:   Tacrolimus IR level 13.1 on 11/7, goal 8-10, dose 3 mg BID.    PLAN:   Please call patient and confirm this was an accurate 12-hour trough. Verify Tacrolimus IR dose 3 mg BID. Confirm no new medications or illness. Confirm no missed doses. If accurate trough and accurate dose, decrease Tacrolimus IR dose to 2 mg BID and repeat labs in 2 weeks.    OUTCOME:   Spoke with patient, they confirm accurate trough level and current dose 3 mg BID. Patient confirmed dose change to 2 mg BID and to repeat labs in 2 weeks. Orders sent to preferred pharmacy for dose change and lab for repeat labs. Patient voiced understanding of plan.

## 2023-11-08 NOTE — PROGRESS NOTES
Anemia Management Note  SUBJECTIVE/OBJECTIVE:  Referred by Dr. Subhash Dutta on 2023  Primary Diagnosis: Anemia of Other Chronic Illness (D63.8)     Secondary Diagnosis:  Organ or tissue replaced by transplant, kidney (Z94.0)  Kidney Tx: 2023  Hgb goal range:  9-10  Epo/Darbo: Aranesp  40 mcg  every two weeks for Hgb <10.0. In clinic  *If Aranesp is ordered dose at 0.45mcg/kg  Iron regimen:  NA  Labs : 2024  Recent FEDERICO use, transfusion, IV iron: Mircera   RX/TX plans : 2024     *[facility]; [phone]      Hx of DVT (2023), Afib, anticoagulate.      Consent to Communicate:  OK to speak with Nabila Quinones (wife) per consent to communicate dated 10/27/2022. No Boxes Checked on Consent to Communicate.         Latest Ref Rng & Units 2023     8:06 AM 2023     7:59 AM 2023    11:52 AM 2023     6:05 AM 10/5/2023     7:52 AM 10/10/2023     7:57 AM 2023     7:53 AM   Anemia   Hemoglobin 13.3 - 17.7 g/dL 10.3  11.8  11.4  10.1  11.9  11.8  12.2    Ferritin 31 - 409 ng/mL     919        BP Readings from Last 3 Encounters:   23 127/83   23 132/86   23 (!) 146/92     Wt Readings from Last 2 Encounters:   23 102.1 kg (225 lb)   23 100 kg (220 lb 8 oz)         ASSESSMENT:  Hgb:Above goal - recommend hold dose  TSat: Due for labs Ferritin: Due for labs    PLAN:  Hold Aranesp and RTC for hgb then aranesp if needed in 2-4 week(s)    Orders needed to be renewed (for next follow-up date) in EPIC: None    Iron labs due:  DUE, every 4 weeks  Added to existing specimen from blood drawn on 1107    Plan discussed with:  no call, chart reviewed    NEXT FOLLOW-UP DATE:  1110, review iron labs    Carrie Leopold, RN BSN  Anemia Services  [facility]  [email or web address]  Office: [phone]  Fax [phone]  **NOTE: Anemia Management Services staff will be out of the office on  and .   Please  reach out to your nurse care coordinator for any questions or concerns during this time.

## 2023-11-09 ENCOUNTER — OFFICE VISIT (OUTPATIENT)
Dept: TRANSPLANT | Facility: CLINIC | Age: 68
End: 2023-11-09
Attending: NURSE PRACTITIONER
Payer: COMMERCIAL

## 2023-11-09 VITALS
HEART RATE: 90 BPM | OXYGEN SATURATION: 95 % | BODY MASS INDEX: 37.12 KG/M2 | DIASTOLIC BLOOD PRESSURE: 79 MMHG | SYSTOLIC BLOOD PRESSURE: 116 MMHG | WEIGHT: 237 LBS | TEMPERATURE: 98.5 F

## 2023-11-09 DIAGNOSIS — Z01.818 PRE-OP EVALUATION: Primary | ICD-10-CM

## 2023-11-09 PROCEDURE — 99213 OFFICE O/P EST LOW 20 MIN: CPT | Performed by: NURSE PRACTITIONER

## 2023-11-09 PROCEDURE — G0463 HOSPITAL OUTPT CLINIC VISIT: HCPCS | Performed by: NURSE PRACTITIONER

## 2023-11-09 ASSESSMENT — PAIN SCALES - GENERAL: PAINLEVEL: NO PAIN (0)

## 2023-11-09 NOTE — PROGRESS NOTES
Iron studies resulted.  TSAT 23  Ferritin 341    No changes to current plan    Carrie Leopold, RN BSN  Anemia Services  [facility]  [email or web address]  Office: [phone]  Fax [phone]  **NOTE: Anemia Management Services staff will be out of the office on Thanksgiving, 11/23 and Friday, 11/24.   Please reach out to your nurse care coordinator for any questions or concerns during this time.

## 2023-11-09 NOTE — NURSING NOTE
"Chief Complaint   Patient presents with    Follow Up     Hernia Preop     Vital signs:  Temp: 98.5  F (36.9  C) Temp src: Oral BP: 116/79 Pulse: 90     SpO2: 95 %       Weight: 107.5 kg (237 lb)  Estimated body mass index is 37.12 kg/m  as calculated from the following:    Height as of 11/6/23: 1.702 m (5' 7\").    Weight as of this encounter: 107.5 kg (237 lb).      Gurwinder Muñoz RN on 11/9/2023 at 1:19 PM    "

## 2023-11-09 NOTE — PROGRESS NOTES
[facility] TRANSPLANT [facility]INIC  [address] [address]  Phone: [phone]  Fax: [phone]  Primary Provider: Gary Serrano  Pre-op Performing Provider: DOUG NAVARRO      PREOPERATIVE EVALUATION:  Today's date: 11/9/2023    Casey is a 68 year old male who presents for a preoperative evaluation.    Surgical Information:  Surgery/Procedure: Herniorrhaphy, umbilical, open  Surgery Location: [facility]   Surgeon: Dr. Clifton   Surgery Date: 12/07/2023  Time of Surgery: 1300   Where patient plans to recover: At home with family  Fax number for surgical facility: Note does not need to be faxed, will be available electronically in Epic.    Assessment & Plan     The proposed surgical procedure is considered INTERMEDIATE risk.    Pre-op evaluation  - I see no contraindications to surgery pending review of pre op tests (labs, CXR, ECG).          - No identified additional risk factors other than previously addressed    Antiplatelet or Anticoagulation Medication Instructions:  HOLD Eliquis 3 days before surgery.   Discussed with Dr. Clifton.     Additional Medication Instructions:  Patient is to take all scheduled medications on the day of surgery EXCEPT for modifications listed below:  Take half tab of metoprolol the AM of surgery. HOLD Eliquis 3 days before surgery.       RECOMMENDATION:  APPROVAL GIVEN to proceed with proposed procedure pending review of diagnostic evaluation.    JADE Mac     Subjective     HPI related to upcoming procedure: Hx of umbilical hernia for years with previous unsuccessful repairs.   Hx of SLK transplant in 6/2023.   No recent acute health issues.   No F/C.       Status of Chronic Conditions:  See problem list for active medical problems.  Problems all longstanding and stable, except as noted/documented.  See ROS for pertinent symptoms related to these conditions.    Review of Systems  CONSTITUTIONAL: NEGATIVE for fever, chills, change in  weight  INTEGUMENTARY/SKIN: NEGATIVE for worrisome rashes, moles or lesions  EYES: NEGATIVE for vision changes or irritation  ENT/MOUTH: NEGATIVE for ear, mouth and throat problems  RESP: NEGATIVE for significant cough or SOB  CV: NEGATIVE for chest pain, palpitations or peripheral edema  GI: NEGATIVE for nausea, abdominal pain, heartburn, or change in bowel habits  : NEGATIVE for frequency, dysuria, or hematuria  MUSCULOSKELETAL: NEGATIVE for significant arthralgias or myalgia  NEURO: NEGATIVE for weakness, dizziness or paresthesias  ENDOCRINE: NEGATIVE for temperature intolerance, skin/hair changes  HEME: NEGATIVE for bleeding problems  PSYCHIATRIC: NEGATIVE for changes in mood or affect    Patient Active Problem List    Diagnosis Date Noted    Neuropathy 11/07/2023     Priority: Medium    Low serum phosphate 09/25/2023     Priority: Medium    HCC (hepatocellular carcinoma) (H) 09/05/2023     Priority: Medium    Anemia in stage 2 chronic kidney disease 08/02/2023     Priority: Medium    Aftercare following organ transplant 08/01/2023     Priority: Medium    Kidney replaced by transplant 06/23/2023     Priority: Medium    CAD (coronary artery disease) 06/23/2023     Priority: Medium    Steroid-induced hyperglycemia 06/23/2023     Priority: Medium    Immunosuppressed status (H24) 06/20/2023     Priority: Medium    Liver replaced by transplant (H) 06/06/2023     Priority: Medium    Chronic atrial fibrillation (H) 05/03/2023     Priority: Medium    CKD (chronic kidney disease), stage II 04/27/2023     Priority: Medium    Deep vein thrombosis (DVT) of non-extremity vein, unspecified chronicity 02/09/2023     Priority: Medium    Psoriatic arthritis (H) 12/07/2022     Priority: Medium    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 difficile colitis     Coronary artery disease     [facility] 4/2023 - complete occlusion of RCA    ESRD (end stage renal disease) on dialysis (H)     Gout     HCC (hepatocellular carcinoma) (H) 9/5/2023    History of hemochromatosis 10/11/2019    Hypertension     IgA nephropathy     Obesity     PAF (paroxysmal atrial fibrillation) (H)     Pre-diabetes 2023    Psoriatic arthritis (H)     RA (rheumatoid arthritis) (H)     Status post kidney transplant 06/06/2023    Induction with thymoglobulin 4mg/kg, + DSA CW9    Status post liver transplantation (H) 06/06/2023     Past Surgical History:   Procedure Laterality Date    APPENDECTOMY      Removed at 18 Years Old     BENCH KIDNEY  06/06/2023    Procedure: Bench kidney;  Surgeon: Chad Clifton MD;  Location:  OR    BENCH LIVER  06/06/2023    Procedure: Bench liver;  Surgeon: Chad Clifton MD;  Location:  OR    COLONOSCOPY      2014 at [facility]     CV CORONARY ANGIOGRAM N/A 04/27/2023    Procedure: Coronary Angiogram;  Surgeon: Pablo Araujo MD;  Location:  HEART CARDIAC CATH LAB    EYE SURGERY Bilateral     Cataract    H STATISTIC PICC LINE INSERTION >5YR, FAILED Left 06/16/2023    Unable to advance catheter over the axillary area    HERNIA REPAIR      History of bilateral inguinal hernia repair: 10/28/2014. Open hernia repair: 10/2017. Abdominal wound exploration and debridement 12/27/2017    INSERT SHUNT PORTAL TRANSJUGULAR INTRAHEPTIC  09/26/2022    IR CVC NON TUNNEL LINE REMOVAL  7/3/2023    IR CVC NON TUNNEL PLACEMENT > 5 YRS  6/14/2023    IR CVC TUNNEL PLACEMENT > 5 YRS OF AGE  01/26/2023    IR PICC PLACEMENT > 5 YRS OF AGE  6/16/2023    IR RENAL  BIOPSY RIGHT  2023    RETURN LIVER TRANSPLANT N/A 2023    Procedure: Return liver transplant. Intra-operative ultrasound;  Surgeon: Chad Clifton MD;  Location: [facility] OR    TRANSPLANT KIDNEY RECIPIENT  DONOR N/A 2023    Procedure: Transplant kidney recipient  donor, ureteral stent placement;  Surgeon: Chad Clifton MD;  Location: [facility] OR    TRANSPLANT LIVER RECIPIENT  DONOR N/A 2023    Procedure: Transplant liver recipient  donor;  Surgeon: Chad Clifton MD;  Location: [facility] OR     Current Outpatient Medications   Medication Sig Dispense Refill    Alcohol Swabs PADS Use to swab the area of the injection or quyen as directed Per insurance coverage 100 each 0    allopurinol (ZYLOPRIM) 100 MG tablet Take 300 mg by mouth daily 180 tablet 3    anakinra (KINERET) 100 MG/0.67ML SOSY injection Inject 0.67 mLs (100 mg) Subcutaneous daily as needed      anakinra (KINERET) 100 MG/0.67ML SOSY injection Inject 0.67 mLs (100 mg) Subcutaneous daily for 5 days 3.35 mL 0    apixaban ANTICOAGULANT (ELIQUIS ANTICOAGULANT) 5 MG tablet Take 1 tablet (5 mg) by mouth 2 times daily 180 tablet 3    atorvastatin (LIPITOR) 20 MG tablet Take 1 tablet (20 mg) by mouth every evening 30 tablet 1    blood glucose (NO BRAND SPECIFIED) lancets standard To use to test glucose level in the blood Use to test blood sugar  4  times daily as directed. To accompany glucose monitor brands per insurance coverage. 100 each 0    blood glucose (NO BRAND SPECIFIED) test strip Use to test blood sugar 3 times daily or as directed. 30 strip 3    blood glucose (NO BRAND SPECIFIED) test strip To use to test glucose level in the blood Use to test blood sugar  4 times daily as directed. To accompany glucose monitor brands per insurance coverage. 4 strip 1    blood glucose monitoring (NO BRAND SPECIFIED) meter device kit Use as directed Per insurance coverage 1 kit 0    gabapentin (NEURONTIN) 100 MG  capsule Take 1 capsule (100 mg) by mouth 3 times daily for 90 days 270 capsule 3    levothyroxine (SYNTHROID/LEVOTHROID) 88 MCG tablet Take 1 tablet (88 mcg) by mouth daily 30 tablet 3    magnesium oxide (MAG-OX) 400 MG tablet Take 2 tablets (800 mg) by mouth 2 times daily 60 tablet 0    metoprolol tartrate (LOPRESSOR) 50 MG tablet Take 1 tablet (50 mg) by mouth 2 times daily for 90 days 180 tablet 3    multivitamin w/minerals (THERA-VIT-M) tablet Take 1 tablet by mouth daily 30 tablet 0    MYFORTIC (BRAND) 180 MG EC tablet Take 3 tablets (540 mg) by mouth 2 times daily 540 tablet 3    pantoprazole (PROTONIX) 40 MG EC tablet Take 1 tablet (40 mg) by mouth every morning (before breakfast) 90 tablet 3    predniSONE (DELTASONE) 5 MG tablet Take 1.5 tablets (7.5 mg) by mouth daily 135 tablet 3    Sharps Container MISC Use as directed to dispose of needles, lancets and other sharps 1 each 0    simethicone (MYLICON) 125 MG chewable tablet Take 125 mg by mouth 4 times daily as needed for intestinal gas      sulfamethoxazole-trimethoprim (BACTRIM) 400-80 MG tablet Take 1 tablet by mouth daily 90 tablet 3    tacrolimus (GENERIC) 0.5 MG capsule Take 1 capsule (0.5 mg) by mouth as needed (For dose changes) For dose changes. (Patient not taking: Reported on 11/7/2023) 180 capsule 3    tacrolimus (GENERIC) 1 MG capsule Take 2 capsules (2 mg) by mouth 2 times daily 360 capsule 3    ursodiol (ACTIGALL) 250 MG tablet Take 1 tablet (250 mg) by mouth 2 times daily 180 tablet 3       No Known Allergies     Social History     Tobacco Use    Smoking status: Never     Passive exposure: Never    Smokeless tobacco: Never   Substance Use Topics    Alcohol use: Not Currently     Comment: Last ETOH use was 12/31/2021     Family History   Problem Relation Age of Onset    Lung Cancer Mother     Colon Cancer Father     Lung Cancer Brother     Heart Failure Brother     Kidney Disease Brother      History   Drug Use Unknown         Objective      There were no vitals taken for this visit.    Physical Exam    GENERAL APPEARANCE: healthy, alert and no distress     EYES: EOMI,  PERRL     HENT: ear canals and TM's normal and nose and mouth without ulcers or lesions     NECK: no adenopathy, no asymmetry, masses, or scars and thyroid normal to palpation     RESP: lungs clear to auscultation - no rales, rhonchi or wheezes     CV: regular rates and rhythm, normal S1 S2, no S3 or S4 and no murmur, click or rub     ABDOMEN:  soft, nontender, no HSM or masses and bowel sounds normal. Healed surgical sxs consistent with sx hx.      MS: extremities normal- no gross deformities noted, no evidence of inflammation in joints, FROM in all extremities.     SKIN: no suspicious lesions or rashes     NEURO: Normal strength and tone, sensory exam grossly normal, mentation intact and speech normal     PSYCH: mentation appears normal. and affect normal/bright     LYMPHATICS: No cervical adenopathy    Recent Labs   Lab Test 11/07/23  0753 10/10/23  0757 09/25/23  0759 09/20/23  1611 08/21/23  0915 08/17/23  0720 08/14/23  0724 06/06/23  2315 06/06/23  2108   HGB 12.2* 11.8*   < >  --    < > 7.5* 7.8*   < > 9.1*    207   < >  --    < > 293 324   < > 114*   INR  --   --   --   --   --  1.22* 1.25*   < > 2.17*    140   < >  --    < > 139 141   < > 142   POTASSIUM 4.3 4.1   < >  --    < > 4.1 4.1   < > 5.0   CR 1.06 0.97   < >  --    < > 1.01 0.94   < > 2.76*   A1C  --   --   --  5.2  --   --   --   --  5.1    < > = values in this interval not displayed.        Diagnostics:  Will order CBC, CMP, INR , CXR  EKG required for known coronary heart disease and not completed in the last 90 days.     Revised Cardiac Risk Index (RCRI):  The patient has the following serious cardiovascular risks for perioperative complications:   - Coronary Artery Disease (MI, positive stress test, angina, Qs on EKG) = 1 point        Signed Electronically by: JADE Mac CNP  Copy of  this evaluation report is provided to requesting physician.

## 2023-11-09 NOTE — LETTER
11/9/2023         RE: Damion Quinones   [address] [address]        Dear Colleague,    Thank you for referring your patient, Damion Quinones, to the [facility] TRANSPLANT [facility]. Please see a copy of my visit note below.    [facility]  [address] [address]  Phone: [phone]  Fax: [phone]  Primary Provider: Gary Serrano  Pre-op Performing Provider: DOUG NAVARRO      PREOPERATIVE EVALUATION:  Today's date: 11/9/2023    Casey is a 68 year old male who presents for a preoperative evaluation.    Surgical Information:  Surgery/Procedure: Herniorrhaphy, umbilical, open  Surgery Location: [facility]   Surgeon: Dr. Clifton   Surgery Date: 12/07/2023  Time of Surgery: 1300   Where patient plans to recover: At home with family  Fax number for surgical facility: Note does not need to be faxed, will be available electronically in Epic.    Assessment & Plan    The proposed surgical procedure is considered INTERMEDIATE risk.    Pre-op evaluation  - I see no contraindications to surgery pending review of pre op tests (labs, CXR, ECG).          - No identified additional risk factors other than previously addressed    Antiplatelet or Anticoagulation Medication Instructions:  HOLD Eliquis 3 days before surgery.   Discussed with Dr. Clifton.     Additional Medication Instructions:  Patient is to take all scheduled medications on the day of surgery EXCEPT for modifications listed below:  Take half tab of metoprolol the AM of surgery. HOLD Eliquis 3 days before surgery.       RECOMMENDATION:  APPROVAL GIVEN to proceed with proposed procedure pending review of diagnostic evaluation.    JADE Mac     Subjective    HPI related to upcoming procedure: Hx of umbilical hernia for years with previous unsuccessful repairs.   Hx of SLK transplant in 6/2023.   No recent acute health issues.   No F/C.       Status of Chronic Conditions:  See  problem list for active medical problems.  Problems all longstanding and stable, except as noted/documented.  See ROS for pertinent symptoms related to these conditions.    Review of Systems  CONSTITUTIONAL: NEGATIVE for fever, chills, change in weight  INTEGUMENTARY/SKIN: NEGATIVE for worrisome rashes, moles or lesions  EYES: NEGATIVE for vision changes or irritation  ENT/MOUTH: NEGATIVE for ear, mouth and throat problems  RESP: NEGATIVE for significant cough or SOB  CV: NEGATIVE for chest pain, palpitations or peripheral edema  GI: NEGATIVE for nausea, abdominal pain, heartburn, or change in bowel habits  : NEGATIVE for frequency, dysuria, or hematuria  MUSCULOSKELETAL: NEGATIVE for significant arthralgias or myalgia  NEURO: NEGATIVE for weakness, dizziness or paresthesias  ENDOCRINE: NEGATIVE for temperature intolerance, skin/hair changes  HEME: NEGATIVE for bleeding problems  PSYCHIATRIC: NEGATIVE for changes in mood or affect    Patient Active Problem List    Diagnosis Date Noted    Neuropathy 11/07/2023     Priority: Medium    Low serum phosphate 09/25/2023     Priority: Medium    HCC (hepatocellular carcinoma) (H) 09/05/2023     Priority: Medium    Anemia in stage 2 chronic kidney disease 08/02/2023     Priority: Medium    Aftercare following organ transplant 08/01/2023     Priority: Medium    Kidney replaced by transplant 06/23/2023     Priority: Medium    CAD (coronary artery disease) 06/23/2023     Priority: Medium    Steroid-induced hyperglycemia 06/23/2023     Priority: Medium    Immunosuppressed status (H24) 06/20/2023     Priority: Medium    Liver replaced by transplant (H) 06/06/2023     Priority: Medium    Chronic atrial fibrillation (H) 05/03/2023     Priority: Medium    CKD (chronic kidney disease), stage II 04/27/2023     Priority: Medium    Deep vein thrombosis (DVT) of non-extremity vein, unspecified chronicity 02/09/2023     Priority: Medium    Psoriatic arthritis (H) 12/07/2022     Priority:  Medium    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 difficile colitis     Coronary artery disease     [facility] 4/2023 - complete occlusion of RCA    ESRD (end stage renal disease) on dialysis (H)     Gout     HCC (hepatocellular carcinoma) (H) 9/5/2023    History of hemochromatosis 10/11/2019    Hypertension     IgA nephropathy     Obesity     PAF (paroxysmal atrial fibrillation) (H)     Pre-diabetes 2023    Psoriatic arthritis (H)     RA (rheumatoid arthritis) (H)     Status post kidney transplant 06/06/2023    Induction with thymoglobulin 4mg/kg, + DSA CW9    Status post liver transplantation (H) 06/06/2023     Past Surgical History:   Procedure Laterality Date    APPENDECTOMY      Removed at 18 Years Old     BENCH KIDNEY  06/06/2023    Procedure: Bench kidney;  Surgeon: Chad Clifton MD;  Location:  OR    [facility] LIVER  06/06/2023    Procedure: Bench liver;  Surgeon: Chad Clifton MD;  Location:  OR    COLONOSCOPY      2014 at [facility]     CV CORONARY ANGIOGRAM N/A 04/27/2023    Procedure: Coronary Angiogram;  Surgeon: Pablo Araujo MD;  Location:  HEART CARDIAC CATH LAB    EYE SURGERY Bilateral     Cataract    H STATISTIC PICC LINE INSERTION >5YR, FAILED Left 06/16/2023    Unable to advance catheter over the axillary area    HERNIA REPAIR      History of bilateral inguinal hernia repair: 10/28/2014. Open hernia repair: 10/2017. Abdominal wound exploration and debridement 12/27/2017    INSERT SHUNT PORTAL  TRANSJUGULAR INTRAHEPTIC  2022    IR CVC NON TUNNEL LINE REMOVAL  7/3/2023    IR CVC NON TUNNEL PLACEMENT > 5 YRS  2023    IR CVC TUNNEL PLACEMENT > 5 YRS OF AGE  2023    IR PICC PLACEMENT > 5 YRS OF AGE  2023    IR RENAL BIOPSY RIGHT  2023    RETURN LIVER TRANSPLANT N/A 2023    Procedure: Return liver transplant. Intra-operative ultrasound;  Surgeon: Chad Clifton MD;  Location: [facility] OR    TRANSPLANT KIDNEY RECIPIENT  DONOR N/A 2023    Procedure: Transplant kidney recipient  donor, ureteral stent placement;  Surgeon: Chad Clifton MD;  Location: [facility] OR    TRANSPLANT LIVER RECIPIENT  DONOR N/A 2023    Procedure: Transplant liver recipient  donor;  Surgeon: Chad Clifton MD;  Location: [facility] OR     Current Outpatient Medications   Medication Sig Dispense Refill    Alcohol Swabs PADS Use to swab the area of the injection or quyen as directed Per insurance coverage 100 each 0    allopurinol (ZYLOPRIM) 100 MG tablet Take 300 mg by mouth daily 180 tablet 3    anakinra (KINERET) 100 MG/0.67ML SOSY injection Inject 0.67 mLs (100 mg) Subcutaneous daily as needed      anakinra (KINERET) 100 MG/0.67ML SOSY injection Inject 0.67 mLs (100 mg) Subcutaneous daily for 5 days 3.35 mL 0    apixaban ANTICOAGULANT (ELIQUIS ANTICOAGULANT) 5 MG tablet Take 1 tablet (5 mg) by mouth 2 times daily 180 tablet 3    atorvastatin (LIPITOR) 20 MG tablet Take 1 tablet (20 mg) by mouth every evening 30 tablet 1    blood glucose (NO BRAND SPECIFIED) lancets standard To use to test glucose level in the blood Use to test blood sugar  4  times daily as directed. To accompany glucose monitor brands per insurance coverage. 100 each 0    blood glucose (NO BRAND SPECIFIED) test strip Use to test blood sugar 3 times daily or as directed. 30 strip 3    blood glucose (NO BRAND SPECIFIED) test strip To use to test glucose level in the blood Use to test blood sugar   4 times daily as directed. To accompany glucose monitor brands per insurance coverage. 4 strip 1    blood glucose monitoring (NO BRAND SPECIFIED) meter device kit Use as directed Per insurance coverage 1 kit 0    gabapentin (NEURONTIN) 100 MG capsule Take 1 capsule (100 mg) by mouth 3 times daily for 90 days 270 capsule 3    levothyroxine (SYNTHROID/LEVOTHROID) 88 MCG tablet Take 1 tablet (88 mcg) by mouth daily 30 tablet 3    magnesium oxide (MAG-OX) 400 MG tablet Take 2 tablets (800 mg) by mouth 2 times daily 60 tablet 0    metoprolol tartrate (LOPRESSOR) 50 MG tablet Take 1 tablet (50 mg) by mouth 2 times daily for 90 days 180 tablet 3    multivitamin w/minerals (THERA-VIT-M) tablet Take 1 tablet by mouth daily 30 tablet 0    MYFORTIC (BRAND) 180 MG EC tablet Take 3 tablets (540 mg) by mouth 2 times daily 540 tablet 3    pantoprazole (PROTONIX) 40 MG EC tablet Take 1 tablet (40 mg) by mouth every morning (before breakfast) 90 tablet 3    predniSONE (DELTASONE) 5 MG tablet Take 1.5 tablets (7.5 mg) by mouth daily 135 tablet 3    Sharps Container MISC Use as directed to dispose of needles, lancets and other sharps 1 each 0    simethicone (MYLICON) 125 MG chewable tablet Take 125 mg by mouth 4 times daily as needed for intestinal gas      sulfamethoxazole-trimethoprim (BACTRIM) 400-80 MG tablet Take 1 tablet by mouth daily 90 tablet 3    tacrolimus (GENERIC) 0.5 MG capsule Take 1 capsule (0.5 mg) by mouth as needed (For dose changes) For dose changes. (Patient not taking: Reported on 11/7/2023) 180 capsule 3    tacrolimus (GENERIC) 1 MG capsule Take 2 capsules (2 mg) by mouth 2 times daily 360 capsule 3    ursodiol (ACTIGALL) 250 MG tablet Take 1 tablet (250 mg) by mouth 2 times daily 180 tablet 3       No Known Allergies     Social History     Tobacco Use    Smoking status: Never     Passive exposure: Never    Smokeless tobacco: Never   Substance Use Topics    Alcohol use: Not Currently     Comment: Last ETOH use  was 12/31/2021     Family History   Problem Relation Age of Onset    Lung Cancer Mother     Colon Cancer Father     Lung Cancer Brother     Heart Failure Brother     Kidney Disease Brother      History   Drug Use Unknown         Objective    There were no vitals taken for this visit.    Physical Exam    GENERAL APPEARANCE: healthy, alert and no distress     EYES: EOMI,  PERRL     HENT: ear canals and TM's normal and nose and mouth without ulcers or lesions     NECK: no adenopathy, no asymmetry, masses, or scars and thyroid normal to palpation     RESP: lungs clear to auscultation - no rales, rhonchi or wheezes     CV: regular rates and rhythm, normal S1 S2, no S3 or S4 and no murmur, click or rub     ABDOMEN:  soft, nontender, no HSM or masses and bowel sounds normal. Healed surgical sxs consistent with sx hx.      MS: extremities normal- no gross deformities noted, no evidence of inflammation in joints, FROM in all extremities.     SKIN: no suspicious lesions or rashes     NEURO: Normal strength and tone, sensory exam grossly normal, mentation intact and speech normal     PSYCH: mentation appears normal. and affect normal/bright     LYMPHATICS: No cervical adenopathy    Recent Labs   Lab Test 11/07/23  0753 10/10/23  0757 09/25/23  0759 09/20/23  1611 08/21/23  0915 08/17/23  0720 08/14/23  0724 06/06/23  2315 06/06/23  2108   HGB 12.2* 11.8*   < >  --    < > 7.5* 7.8*   < > 9.1*    207   < >  --    < > 293 324   < > 114*   INR  --   --   --   --   --  1.22* 1.25*   < > 2.17*    140   < >  --    < > 139 141   < > 142   POTASSIUM 4.3 4.1   < >  --    < > 4.1 4.1   < > 5.0   CR 1.06 0.97   < >  --    < > 1.01 0.94   < > 2.76*   A1C  --   --   --  5.2  --   --   --   --  5.1    < > = values in this interval not displayed.        Diagnostics:  Will order CBC, CMP, INR , CXR  EKG required for known coronary heart disease and not completed in the last 90 days.     Revised Cardiac Risk Index (RCRI):  The  patient has the following serious cardiovascular risks for perioperative complications:   - Coronary Artery Disease (MI, positive stress test, angina, Qs on EKG) = 1 point        Signed Electronically by: JADE Mac CNP  Copy of this evaluation report is provided to requesting physician.           Again, thank you for allowing me to participate in the care of your patient.        Sincerely,        JADE Mac CNP

## 2023-11-10 LAB
LABORATORY REPORT: NORMAL
MYELOPEROXIDASE AB SER IA-ACNC: 0.3 U/ML
MYELOPEROXIDASE AB SER IA-ACNC: 0.3 U/ML
MYELOPEROXIDASE AB SER IA-ACNC: NEGATIVE
MYELOPEROXIDASE AB SER IA-ACNC: NEGATIVE
PLPETH BLD-MCNC: <10 NG/ML
POPETH BLD-MCNC: <10 NG/ML
PROTEINASE3 AB SER IA-ACNC: <1 U/ML
PROTEINASE3 AB SER IA-ACNC: <1 U/ML
PROTEINASE3 AB SER IA-ACNC: NEGATIVE
PROTEINASE3 AB SER IA-ACNC: NEGATIVE

## 2023-11-13 ENCOUNTER — TELEPHONE (OUTPATIENT)
Dept: TRANSPLANT | Facility: CLINIC | Age: 68
End: 2023-11-13
Payer: MEDICARE

## 2023-11-13 DIAGNOSIS — Z94.0 KIDNEY REPLACED BY TRANSPLANT: Primary | ICD-10-CM

## 2023-11-13 NOTE — TELEPHONE ENCOUNTER
Subhash Dutta MD [facility]us[facility], Angelita GRAVES RN  Please obtain B12, SPEP w/ ifx, UPEP w/ fix, K/L. I'm doing this for neuropathy. Please refer to neurology for neuropathy.    Placed lab orders in EPIC   Placed referral for Neurology

## 2023-11-14 NOTE — TELEPHONE ENCOUNTER
Action 11/14/23 MV 1.17pm   Action Taken Imaging request faxed to [facility]     Action 11/27/23 MV 10.22am   Action Taken Images resolved in PACS       RECORDS RECEIVED FROM: internal   REASON FOR VISIT: neuropathy    Date of Appt: 11/29/23   NOTES (FOR ALL VISITS) STATUS DETAILS   OFFICE NOTE from referring provider Internal Dr Subhash Dutta @ [facility] Transplant:  11/13/23 telephone encounter  11/7/23   MEDICATION LIST Internal    IMAGING  (FOR ALL VISITS)     MRI (HEAD, NECK, SPINE) PACS [facility]:  MRI Brain 6/12/23  MRA Neck Carotid 6/12/23  MRA Brain COW 6/12/23   CT (HEAD, NECK, SPINE) PACS [facility]:  CT Head 6/12/23  CT Head 4/7/23  CT Head 1/23/23    [facility]:  CT Head 4/3/23    [facility]:  CT Head 11/24/22  CT Head 9/30/22

## 2023-11-16 ENCOUNTER — TRANSFERRED RECORDS (OUTPATIENT)
Dept: HEALTH INFORMATION MANAGEMENT | Facility: CLINIC | Age: 68
End: 2023-11-16
Payer: MEDICARE

## 2023-11-21 ENCOUNTER — LAB (OUTPATIENT)
Dept: LAB | Facility: CLINIC | Age: 68
End: 2023-11-21
Payer: COMMERCIAL

## 2023-11-21 ENCOUNTER — ANCILLARY PROCEDURE (OUTPATIENT)
Dept: GENERAL RADIOLOGY | Facility: CLINIC | Age: 68
End: 2023-11-21
Attending: NURSE PRACTITIONER
Payer: COMMERCIAL

## 2023-11-21 DIAGNOSIS — Z94.4 LIVER TRANSPLANT RECIPIENT (H): ICD-10-CM

## 2023-11-21 DIAGNOSIS — Z01.818 PRE-OP EVALUATION: ICD-10-CM

## 2023-11-21 DIAGNOSIS — D63.1 ANEMIA IN STAGE 2 CHRONIC KIDNEY DISEASE: ICD-10-CM

## 2023-11-21 DIAGNOSIS — Z79.899 ENCOUNTER FOR LONG-TERM CURRENT USE OF MEDICATION: ICD-10-CM

## 2023-11-21 DIAGNOSIS — Z94.4 LIVER REPLACED BY TRANSPLANT (H): ICD-10-CM

## 2023-11-21 DIAGNOSIS — Z48.298 AFTERCARE FOLLOWING ORGAN TRANSPLANT: ICD-10-CM

## 2023-11-21 DIAGNOSIS — N18.2 ANEMIA IN STAGE 2 CHRONIC KIDNEY DISEASE: ICD-10-CM

## 2023-11-21 DIAGNOSIS — Z94.0 KIDNEY REPLACED BY TRANSPLANT: ICD-10-CM

## 2023-11-21 LAB
ALBUMIN SERPL BCG-MCNC: 3.6 G/DL (ref 3.5–5.2)
ALP SERPL-CCNC: 130 U/L (ref 40–150)
ALT SERPL W P-5'-P-CCNC: 14 U/L (ref 0–70)
ANION GAP SERPL CALCULATED.3IONS-SCNC: 11 MMOL/L (ref 7–15)
AST SERPL W P-5'-P-CCNC: 19 U/L (ref 0–45)
BASOPHILS # BLD AUTO: 0 10E3/UL (ref 0–0.2)
BASOPHILS NFR BLD AUTO: 0 %
BILIRUB SERPL-MCNC: 0.3 MG/DL
BUN SERPL-MCNC: 14.5 MG/DL (ref 8–23)
CALCIUM SERPL-MCNC: 9.2 MG/DL (ref 8.8–10.2)
CHLORIDE SERPL-SCNC: 104 MMOL/L (ref 98–107)
CREAT SERPL-MCNC: 1.05 MG/DL (ref 0.67–1.17)
DEPRECATED HCO3 PLAS-SCNC: 25 MMOL/L (ref 22–29)
EGFRCR SERPLBLD CKD-EPI 2021: 78 ML/MIN/1.73M2
EOSINOPHIL # BLD AUTO: 0.1 10E3/UL (ref 0–0.7)
EOSINOPHIL NFR BLD AUTO: 2 %
ERYTHROCYTE [DISTWIDTH] IN BLOOD BY AUTOMATED COUNT: 17.2 % (ref 10–15)
FERRITIN SERPL-MCNC: 204 NG/ML (ref 31–409)
GLUCOSE SERPL-MCNC: 97 MG/DL (ref 70–99)
HCT VFR BLD AUTO: 40.1 % (ref 40–53)
HGB BLD-MCNC: 12.2 G/DL (ref 13.3–17.7)
IMM GRANULOCYTES # BLD: 0.1 10E3/UL
IMM GRANULOCYTES NFR BLD: 2 %
IRON BINDING CAPACITY (ROCHE): 269 UG/DL (ref 240–430)
IRON SATN MFR SERPL: 22 % (ref 15–46)
IRON SERPL-MCNC: 60 UG/DL (ref 61–157)
LYMPHOCYTES # BLD AUTO: 0.7 10E3/UL (ref 0.8–5.3)
LYMPHOCYTES NFR BLD AUTO: 23 %
MAGNESIUM SERPL-MCNC: 1.6 MG/DL (ref 1.7–2.3)
MCH RBC QN AUTO: 28.4 PG (ref 26.5–33)
MCHC RBC AUTO-ENTMCNC: 30.4 G/DL (ref 31.5–36.5)
MCV RBC AUTO: 93 FL (ref 78–100)
MONOCYTES # BLD AUTO: 0.3 10E3/UL (ref 0–1.3)
MONOCYTES NFR BLD AUTO: 8 %
NEUTROPHILS # BLD AUTO: 2 10E3/UL (ref 1.6–8.3)
NEUTROPHILS NFR BLD AUTO: 64 %
PHOSPHATE SERPL-MCNC: 3.3 MG/DL (ref 2.5–4.5)
PLATELET # BLD AUTO: 187 10E3/UL (ref 150–450)
POTASSIUM SERPL-SCNC: 4.2 MMOL/L (ref 3.4–5.3)
PROT SERPL-MCNC: 5.5 G/DL (ref 6.4–8.3)
RBC # BLD AUTO: 4.3 10E6/UL (ref 4.4–5.9)
SODIUM SERPL-SCNC: 140 MMOL/L (ref 135–145)
TACROLIMUS BLD-MCNC: 11.4 UG/L (ref 5–15)
TME LAST DOSE: NORMAL H
TME LAST DOSE: NORMAL H
TOTAL PROTEIN SERUM FOR ELP: 5.2 G/DL (ref 6.4–8.3)
VIT B12 SERPL-MCNC: 924 PG/ML (ref 232–1245)
WBC # BLD AUTO: 3.1 10E3/UL (ref 4–11)

## 2023-11-21 PROCEDURE — 86335 IMMUNFIX E-PHORSIS/URINE/CSF: CPT | Performed by: PATHOLOGY

## 2023-11-21 PROCEDURE — 71046 X-RAY EXAM CHEST 2 VIEWS: CPT | Performed by: RADIOLOGY

## 2023-11-21 PROCEDURE — 80053 COMPREHEN METABOLIC PANEL: CPT

## 2023-11-21 PROCEDURE — 82728 ASSAY OF FERRITIN: CPT

## 2023-11-21 PROCEDURE — 84155 ASSAY OF PROTEIN SERUM: CPT | Mod: 59

## 2023-11-21 PROCEDURE — 80197 ASSAY OF TACROLIMUS: CPT

## 2023-11-21 PROCEDURE — 36415 COLL VENOUS BLD VENIPUNCTURE: CPT

## 2023-11-21 PROCEDURE — 80180 DRUG SCRN QUAN MYCOPHENOLATE: CPT

## 2023-11-21 PROCEDURE — 84100 ASSAY OF PHOSPHORUS: CPT

## 2023-11-21 PROCEDURE — 83540 ASSAY OF IRON: CPT

## 2023-11-21 PROCEDURE — 83735 ASSAY OF MAGNESIUM: CPT

## 2023-11-21 PROCEDURE — 82607 VITAMIN B-12: CPT

## 2023-11-21 PROCEDURE — 85025 COMPLETE CBC W/AUTO DIFF WBC: CPT | Mod: QW

## 2023-11-21 PROCEDURE — 83550 IRON BINDING TEST: CPT

## 2023-11-21 PROCEDURE — 84166 PROTEIN E-PHORESIS/URINE/CSF: CPT | Performed by: PATHOLOGY

## 2023-11-21 PROCEDURE — 93000 ELECTROCARDIOGRAM COMPLETE: CPT | Performed by: INTERNAL MEDICINE

## 2023-11-21 PROCEDURE — 86334 IMMUNOFIX E-PHORESIS SERUM: CPT | Performed by: PATHOLOGY

## 2023-11-21 PROCEDURE — 84165 PROTEIN E-PHORESIS SERUM: CPT | Performed by: PATHOLOGY

## 2023-11-21 PROCEDURE — 83521 IG LIGHT CHAINS FREE EACH: CPT

## 2023-11-21 NOTE — PROGRESS NOTES
MyChart message from Casey received 11/17 stating that Casey's prednisone dose was decreased from 7.5 mg to 5 mg daily. Med list updated.

## 2023-11-22 ENCOUNTER — PATIENT OUTREACH (OUTPATIENT)
Dept: CARE COORDINATION | Facility: CLINIC | Age: 68
End: 2023-11-22

## 2023-11-22 ENCOUNTER — LAB (OUTPATIENT)
Dept: LAB | Facility: CLINIC | Age: 68
End: 2023-11-22
Payer: COMMERCIAL

## 2023-11-22 DIAGNOSIS — Z94.0 KIDNEY TRANSPLANTED: Primary | ICD-10-CM

## 2023-11-22 DIAGNOSIS — Z94.4 LIVER TRANSPLANT RECIPIENT (H): ICD-10-CM

## 2023-11-22 DIAGNOSIS — Z94.4 LIVER REPLACED BY TRANSPLANT (H): ICD-10-CM

## 2023-11-22 DIAGNOSIS — R79.89 LOW SERUM PHOSPHATE: ICD-10-CM

## 2023-11-22 DIAGNOSIS — Z01.818 PRE-OP EVALUATION: ICD-10-CM

## 2023-11-22 DIAGNOSIS — D63.8 ANEMIA IN OTHER CHRONIC DISEASES CLASSIFIED ELSEWHERE: Primary | ICD-10-CM

## 2023-11-22 DIAGNOSIS — Z94.0 KIDNEY REPLACED BY TRANSPLANT: ICD-10-CM

## 2023-11-22 LAB
ALBUMIN SERPL ELPH-MCNC: 3.3 G/DL (ref 3.7–5.1)
ALPHA1 GLOB SERPL ELPH-MCNC: 0.4 G/DL (ref 0.2–0.4)
ALPHA2 GLOB SERPL ELPH-MCNC: 0.6 G/DL (ref 0.5–0.9)
ATRIAL RATE - MUSE: NORMAL BPM
B-GLOBULIN SERPL ELPH-MCNC: 0.7 G/DL (ref 0.6–1)
CMV DNA SPEC NAA+PROBE-ACNC: 53 IU/ML
CMV DNA SPEC NAA+PROBE-LOG#: 1.7 {LOG_COPIES}/ML
DIASTOLIC BLOOD PRESSURE - MUSE: NORMAL MMHG
GAMMA GLOB SERPL ELPH-MCNC: 0.3 G/DL (ref 0.7–1.6)
INR PPP: 1.4 (ref 0.85–1.15)
INTERPRETATION ECG - MUSE: NORMAL
KAPPA LC FREE SER-MCNC: 1.33 MG/DL (ref 0.33–1.94)
KAPPA LC FREE/LAMBDA FREE SER NEPH: 0.93 {RATIO} (ref 0.26–1.65)
LAMBDA LC FREE SERPL-MCNC: 1.43 MG/DL (ref 0.57–2.63)
M PROTEIN SERPL ELPH-MCNC: 0 G/DL
MYCOPHENOLATE SERPL LC/MS/MS-MCNC: 1.08 MG/L (ref 1–3.5)
MYCOPHENOLATE-G SERPL LC/MS/MS-MCNC: 55.1 MG/L (ref 30–95)
P AXIS - MUSE: NORMAL DEGREES
PR INTERVAL - MUSE: NORMAL MS
PROT ELPH PNL UR ELPH: NORMAL
PROT PATTERN SERPL ELPH-IMP: ABNORMAL
PROT PATTERN SERPL IFE-IMP: NORMAL
PROT PATTERN UR ELPH-IMP: NORMAL
QRS DURATION - MUSE: 72 MS
QT - MUSE: 348 MS
QTC - MUSE: 386 MS
R AXIS - MUSE: 49 DEGREES
SYSTOLIC BLOOD PRESSURE - MUSE: NORMAL MMHG
T AXIS - MUSE: 0 DEGREES
TME LAST DOSE: NORMAL H
TME LAST DOSE: NORMAL H
VENTRICULAR RATE- MUSE: 74 BPM

## 2023-11-22 PROCEDURE — 85610 PROTHROMBIN TIME: CPT | Mod: QW

## 2023-11-22 PROCEDURE — 36415 COLL VENOUS BLD VENIPUNCTURE: CPT

## 2023-11-22 RX ORDER — MEPERIDINE HYDROCHLORIDE 25 MG/ML
25 INJECTION INTRAMUSCULAR; INTRAVENOUS; SUBCUTANEOUS EVERY 30 MIN PRN
Status: CANCELLED | OUTPATIENT
Start: 2023-11-22

## 2023-11-22 RX ORDER — HEPARIN SODIUM (PORCINE) LOCK FLUSH IV SOLN 100 UNIT/ML 100 UNIT/ML
5 SOLUTION INTRAVENOUS
Status: CANCELLED | OUTPATIENT
Start: 2023-11-22

## 2023-11-22 RX ORDER — DIPHENHYDRAMINE HYDROCHLORIDE 50 MG/ML
50 INJECTION INTRAMUSCULAR; INTRAVENOUS
Status: CANCELLED
Start: 2023-11-22

## 2023-11-22 RX ORDER — EPINEPHRINE 1 MG/ML
0.3 INJECTION, SOLUTION, CONCENTRATE INTRAVENOUS EVERY 5 MIN PRN
Status: CANCELLED | OUTPATIENT
Start: 2023-11-22

## 2023-11-22 RX ORDER — TACROLIMUS 1 MG/1
1 CAPSULE ORAL EVERY 12 HOURS
Qty: 180 CAPSULE | Refills: 3 | Status: ON HOLD | OUTPATIENT
Start: 2023-11-22 | End: 2023-12-20

## 2023-11-22 RX ORDER — ALBUTEROL SULFATE 90 UG/1
1-2 AEROSOL, METERED RESPIRATORY (INHALATION)
Status: CANCELLED
Start: 2023-11-22

## 2023-11-22 RX ORDER — METHYLPREDNISOLONE SODIUM SUCCINATE 125 MG/2ML
125 INJECTION, POWDER, LYOPHILIZED, FOR SOLUTION INTRAMUSCULAR; INTRAVENOUS
Status: CANCELLED
Start: 2023-11-22

## 2023-11-22 RX ORDER — ALBUTEROL SULFATE 0.83 MG/ML
2.5 SOLUTION RESPIRATORY (INHALATION)
Status: CANCELLED | OUTPATIENT
Start: 2023-11-22

## 2023-11-22 RX ORDER — HEPARIN SODIUM,PORCINE 10 UNIT/ML
5-20 VIAL (ML) INTRAVENOUS DAILY PRN
Status: CANCELLED | OUTPATIENT
Start: 2023-11-22

## 2023-11-22 RX ORDER — TACROLIMUS 0.5 MG/1
0.5 CAPSULE ORAL EVERY 12 HOURS
Qty: 180 CAPSULE | Refills: 3 | Status: ON HOLD | OUTPATIENT
Start: 2023-11-22 | End: 2023-12-20

## 2023-11-22 NOTE — TELEPHONE ENCOUNTER
ISSUE:   Tacrolimus IR level 11.4 on 11/21, goal 8-10, dose 2 mg BID.    PLAN:   Please call patient and confirm this was an accurate 12-hour trough. Verify Tacrolimus IR dose . Confirm no new medications or illness. Confirm no missed doses. If accurate trough and accurate dose, decrease Tacrolimus IR dose to 1.5 mg BID and repeat labs in a month. Apoorva Zaman RN     OUTCOME:   Spoke with patient, they confirm accurate trough level and current dose 2 mg BID. Patient confirmed dose change to 1.5 mg BID and to repeat labs in 1 months. Orders sent to preferred pharmacy for dose change and lab for repeat labs. Patient voiced understanding of plan.      Charito Nixon LPN

## 2023-11-22 NOTE — PROGRESS NOTES
Follow-up with anemia management service:    Per [facility] communication, IV iron is preferred, with infusions done at [facility].'    Injectafer therapy plan entered  [facility] scheduling pool notified.         Latest Ref Rng & Units 9/25/2023     7:59 AM 9/27/2023    11:52 AM 9/28/2023     6:05 AM 10/5/2023     7:52 AM 10/10/2023     7:57 AM 11/7/2023     7:53 AM 11/21/2023     7:42 AM   Anemia   Hemoglobin 13.3 - 17.7 g/dL 11.8  11.4  10.1  11.9  11.8  12.2  12.2    Ferritin 31 - 409 ng/mL    [phone]   341  204        Carrie Leopold, RN BSN  Anemia Services  [facility]  [email or web address]  Office: [phone]  Fax [phone]  **NOTE: Anemia Management Services staff will be out of the office on Thanksgiving, 11/23 and Friday, 11/24.   Please reach out to your nurse care coordinator for any questions or concerns during this time.

## 2023-11-22 NOTE — PROGRESS NOTES
Anemia Management Note  SUBJECTIVE/OBJECTIVE:  Referred by Dr. Subhash Dutta on 2023  Primary Diagnosis: Anemia of Other Chronic Illness (D63.8)     Secondary Diagnosis:  Organ or tissue replaced by transplant, kidney (Z94.0)  Kidney Tx: 2023  Hgb goal range:  9-10  Epo/Darbo: Aranesp  40 mcg  every two weeks for Hgb <10.0. In clinic  *If Aranesp is ordered dose at 0.45mcg/kg  Iron regimen:  NA  Injectafer completed [id number]    Labs : 2024  Recent FEDERICO use, transfusion, IV iron: Mircera   RX/TX plans : 2024     *[facility]; [phone]      Hx of DVT (2023), Afib, anticoagulate.      Consent to Communicate:  OK to speak with Nabila Quinones (wife) per consent to communicate dated 10/27/2022. No Boxes Checked on Consent to Communicate.         Latest Ref Rng & Units 2023     7:59 AM 2023    11:52 AM 2023     6:05 AM 10/5/2023     7:52 AM 10/10/2023     7:57 AM 2023     7:53 AM 2023     7:42 AM   Anemia   Hemoglobin 13.3 - 17.7 g/dL 11.8  11.4  10.1  11.9  11.8  12.2  12.2    Ferritin 31 - 409 ng/mL    919   341  204      BP Readings from Last 3 Encounters:   23 116/79   23 127/83   23 132/86     Wt Readings from Last 2 Encounters:   23 107.5 kg (237 lb)   23 102.1 kg (225 lb)         ASSESSMENT:  Hgb:Above goal - recommend hold dose  TSat: not at goal of >30% Ferritin: At goal (>100ng/mL)    PLAN:  Hold Aranesp and RTC for hgb then aranesp if needed in 2-4 week(s)    Orders needed to be renewed (for next follow-up date) in EPIC: None    Iron labs due:  every 4 weeks, due later Dec 2023    Plan discussed with:  Alex via MyChart, suggesting iron supplement      NEXT FOLLOW-UP DATE:  [id number] did he reply re: iron?    Carrie Leopold, RN BSN  Anemia Services  [facility]  [email or web address]  Office: [phone]  Fax [phone]  **NOTE: Anemia Management Services staff will be out of the office on  Thanksgiving, 11/23 and Friday, 11/24.   Please reach out to your nurse care coordinator for any questions or concerns during this time.

## 2023-11-23 ENCOUNTER — TRANSFERRED RECORDS (OUTPATIENT)
Dept: MULTI SPECIALTY CLINIC | Facility: CLINIC | Age: 68
End: 2023-11-23

## 2023-11-23 LAB — RETINOPATHY: NORMAL

## 2023-11-27 NOTE — PROGRESS NOTES
Injectafer infusions scheduled on 11/30 and 12/11    Carrie Leopold, RN BSN  Anemia Services  [facility]  [email or web address]  Office: [phone]  Fax [phone]

## 2023-11-28 ENCOUNTER — OFFICE VISIT (OUTPATIENT)
Dept: CARDIOLOGY | Facility: CLINIC | Age: 68
End: 2023-11-28
Attending: INTERNAL MEDICINE
Payer: COMMERCIAL

## 2023-11-28 VITALS
HEART RATE: 64 BPM | RESPIRATION RATE: 16 BRPM | HEIGHT: 67 IN | SYSTOLIC BLOOD PRESSURE: 146 MMHG | BODY MASS INDEX: 38 KG/M2 | WEIGHT: 242.1 LBS | DIASTOLIC BLOOD PRESSURE: 84 MMHG

## 2023-11-28 DIAGNOSIS — E66.01 CLASS 2 SEVERE OBESITY DUE TO EXCESS CALORIES WITH SERIOUS COMORBIDITY AND BODY MASS INDEX (BMI) OF 37.0 TO 37.9 IN ADULT (H): ICD-10-CM

## 2023-11-28 DIAGNOSIS — K70.31 ALCOHOLIC CIRRHOSIS OF LIVER WITH ASCITES (H): ICD-10-CM

## 2023-11-28 DIAGNOSIS — E66.812 CLASS 2 SEVERE OBESITY DUE TO EXCESS CALORIES WITH SERIOUS COMORBIDITY AND BODY MASS INDEX (BMI) OF 37.0 TO 37.9 IN ADULT (H): ICD-10-CM

## 2023-11-28 DIAGNOSIS — I25.118 CORONARY ARTERY DISEASE OF NATIVE ARTERY OF NATIVE HEART WITH STABLE ANGINA PECTORIS (H): ICD-10-CM

## 2023-11-28 DIAGNOSIS — Z94.0 S/P KIDNEY TRANSPLANT: ICD-10-CM

## 2023-11-28 DIAGNOSIS — Z94.4 LIVER TRANSPLANT RECIPIENT (H): ICD-10-CM

## 2023-11-28 DIAGNOSIS — Z94.4 LIVER REPLACED BY TRANSPLANT (H): ICD-10-CM

## 2023-11-28 DIAGNOSIS — I48.21 PERMANENT ATRIAL FIBRILLATION (H): Primary | ICD-10-CM

## 2023-11-28 DIAGNOSIS — N52.1 ERECTILE DYSFUNCTION DUE TO DISEASES CLASSIFIED ELSEWHERE: ICD-10-CM

## 2023-11-28 PROCEDURE — 99215 OFFICE O/P EST HI 40 MIN: CPT | Performed by: INTERNAL MEDICINE

## 2023-11-28 RX ORDER — ATORVASTATIN CALCIUM 40 MG/1
40 TABLET, FILM COATED ORAL EVERY EVENING
Qty: 90 TABLET | Refills: 3 | Status: SHIPPED | OUTPATIENT
Start: 2023-11-28 | End: 2024-08-16

## 2023-11-28 RX ORDER — TADALAFIL 5 MG/1
5 TABLET ORAL DAILY PRN
Qty: 20 TABLET | Refills: 5 | Status: SHIPPED | OUTPATIENT
Start: 2023-11-28 | End: 2023-11-28

## 2023-11-28 RX ORDER — TADALAFIL 5 MG/1
5 TABLET ORAL DAILY PRN
Qty: 20 TABLET | Refills: 5 | Status: SHIPPED | OUTPATIENT
Start: 2023-11-28 | End: 2024-09-09

## 2023-11-28 NOTE — PROGRESS NOTES
[facility] HE[facility]   [address]  [place] [address]   [email or web address]   OFFICE: [phone]     CARDIOLOGY CLINIC NOTE     Thank you, Dr. Serrano, Gary BROWN, for asking the [facility] Heart Care team to see Mr. Damion Quinones to  Follow Up        Assessment/Recommendations   Assessment:    Coronary artery disease with  of RCA and preserved LVEF - class I-II angina. Stable.  Permanent atrial fibrillation - rate controlled. Asymptomatic. On Eliquis for stroke prevention.  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  H/o DVT, likely provoked.  Erectile dysfunction - likely multifactorial. From a cardiac standpoint he is likely safe to resume sexual activity.    Plan:  Increase atorvastatin to 40mg daily  Start tadalafil 5-10 mg as needed 30 minutes prior to sexual intercourse.  Continue other medications without changes  Encouraged regular exercise  Okay to proceed with upcoming herniorrhaphy without additional cardiac evaluation.  Follow up in about 6 months.    Total time spent on the day of the encounter doing chart review, patient visit, documentation was 42 min         History of Present Illness   Mr. Damion Quinones is a 68 year old male with a significant past history of coronary artery disease, atrial fibrillation, alcoholic cirrhosis c/b hepatic encephalopathy, ESRD on HD status post combined liver/kidney transplant who presents to establish cardiac care.     was identified to have severe single vessel CAD with  of his RCA in April, 2023 as part of his pre-transplant evaluation. He also has atrial fibrillation which has been present at least since 2016 and has been persistent for the past several years. He is on long-term oral anticoagulation for stroke prevention as well as to treat chronic DVTs. He is also s/p combined liver/kidney transplant on 6/6/23.     Casey presents again with his wife,  Nabila. He continues to feel well, now 6 months after his liver/kidney transplant. He is riding a stationary bicycle 30 minutes every other day without anginal symptoms. He again asks about safety of sexual activity and whether he could take medication for erectile dysfunction.    Other than noted above, Mr. Quinones denies any chest pain/pressure/tightness, shortness of breath at rest or with exertion, light headedness/dizziness, pre-syncope, syncope, lower extremity swelling, palpitations, paroxysmal nocturnal dyspnea (PND), or orthopnea.     Cardiac Problems and Cardiac Diagnostics     Most Recent Cardiac testing:  ECG dated 11/21/23 (personaly reviewed and interpreted): atrial fibrillation, controlled heart rate at 74 bpm.    ECHO (report reviewed):   TTE 6/5/23  Interpretation Summary  Technically difficult study. The patient's rhythm is atrial fibrillation.  Global and regional left ventricular function is normal with an EF of 55-60%.  Global right ventricular function is normal.  No significant valvular abnormalities present.  IVC diameter <2.1 cm collapsing >50% with sniff suggests a normal RA pressure  of 3 mmHg.  No pericardial effusion is present.  No significant changes noted.    Cardiac cath: from 4/27/23 demonstrated   Left Main   The vessel is moderate in size and is angiographically normal.      Left Anterior Descending   The vessel is moderate in size.   Prox LAD lesion is 30% stenosed.      First Diagonal Branch   The vessel is moderate in size.   1st Diag lesion is 50% stenosed.      First Septal Branch   The vessel is small.      Second Diagonal Branch   The vessel is small.   2nd Diag lesion is 50% stenosed.      Second Septal Branch   The vessel is small.      Third Diagonal Branch   The vessel is small and is angiographically normal.      Left Circumflex   The vessel is moderate in size and is angiographically normal.      First Obtuse Marginal Branch   The vessel is large and is angiographically  normal.      Lateral First Obtuse Marginal Branch   The vessel is moderate in size.      Second Obtuse Marginal Branch   The vessel is small and is angiographically normal.      Right Coronary Artery   The vessel is moderate in size.   Ost RCA to Mid RCA lesion is 50% stenosed.   Mid RCA lesion is 100% stenosed.   Mid RCA to Dist RCA lesion is 50% stenosed.      Right Posterior Descending Artery   The vessel is small.   Collaterals   RPDA filled by collaterals from Dist LAD.         Right Posterior Atrioventricular Artery   The vessel is small and is angiographically normal.   Collaterals   RPAV filled by collaterals from Dist Cx.         First Right Posterolateral Branch   The vessel is small and is angiographically normal.      Second Right Posterolateral Branch   The vessel is small and is angiographically normal.                  Medications  Allergies   Current Outpatient Medications   Medication Sig Dispense Refill    Alcohol Swabs PADS Use to swab the area of the injection or quyen as directed Per insurance coverage 100 each 0    allopurinol (ZYLOPRIM) 100 MG tablet Take 300 mg by mouth daily 180 tablet 3    anakinra (KINERET) 100 MG/0.67ML SOSY injection Inject 0.67 mLs (100 mg) Subcutaneous daily as needed      anakinra (KINERET) 100 MG/0.67ML SOSY injection Inject 0.67 mLs (100 mg) Subcutaneous daily for 5 days 3.35 mL 0    apixaban ANTICOAGULANT (ELIQUIS ANTICOAGULANT) 5 MG tablet Take 1 tablet (5 mg) by mouth 2 times daily 180 tablet 3    atorvastatin (LIPITOR) 20 MG tablet Take 1 tablet (20 mg) by mouth every evening 30 tablet 1    gabapentin (NEURONTIN) 100 MG capsule Take 1 capsule (100 mg) by mouth 3 times daily for 90 days 270 capsule 3    levothyroxine (SYNTHROID/LEVOTHROID) 88 MCG tablet Take 1 tablet (88 mcg) by mouth daily 30 tablet 3    magnesium oxide (MAG-OX) 400 MG tablet Take 2 tablets (800 mg) by mouth 2 times daily 60 tablet 0    metoprolol tartrate (LOPRESSOR) 50 MG tablet Take 1 tablet  "(50 mg) by mouth 2 times daily for 90 days 180 tablet 3    multivitamin w/minerals (THERA-VIT-M) tablet Take 1 tablet by mouth daily 30 tablet 0    MYFORTIC (BRAND) 180 MG EC tablet Take 3 tablets (540 mg) by mouth 2 times daily 540 tablet 3    pantoprazole (PROTONIX) 40 MG EC tablet Take 1 tablet (40 mg) by mouth every morning (before breakfast) 90 tablet 3    predniSONE (DELTASONE) 5 MG tablet Take 5 mg by mouth daily 135 tablet 3    Sharps Container MISC Use as directed to dispose of needles, lancets and other sharps 1 each 0    simethicone (MYLICON) 125 MG chewable tablet Take 125 mg by mouth 4 times daily as needed for intestinal gas      sulfamethoxazole-trimethoprim (BACTRIM) 400-80 MG tablet Take 1 tablet by mouth daily 90 tablet 3    tacrolimus (GENERIC) 0.5 MG capsule Take 1 capsule (0.5 mg) by mouth every 12 hours Total dose: 1.5mg  capsule 3    tacrolimus (GENERIC) 1 MG capsule Take 1 capsule (1 mg) by mouth every 12 hours Total dose: 1.5mg  capsule 3    ursodiol (ACTIGALL) 250 MG tablet Take 1 tablet (250 mg) by mouth 2 times daily 180 tablet 3      No Known Allergies     Physical Examination Review of Systems   Vitals: BP (!) 146/84 (BP Location: Right arm, Patient Position: Sitting, Cuff Size: Adult Large)   Pulse 64   Resp 16   Ht 1.702 m (5' 7\")   Wt 109.8 kg (242 lb 1.6 oz)   BMI 37.92 kg/m    BMI= Body mass index is 37.92 kg/m .  Wt Readings from Last 3 Encounters:   11/28/23 109.8 kg (242 lb 1.6 oz)   11/09/23 107.5 kg (237 lb)   11/06/23 102.1 kg (225 lb)       General: pleasant male. No acute distress.   HENT: external ears normal. Nares patent. Mucous membranes moist.  Eyes: perrla, extraocular muscles intact. No scleral icterus.   Neck: No JVD  Lungs: clear to auscultation  COR: mildly fast rate, irregularly irregular rhythm, No murmurs, rubs, or gallops  Abd: nondistended, BS present  Extrem: No edema        Please refer above for cardiac ROS details.       Past History "   Past Medical History:   Past Medical History:   Diagnosis Date    Alcoholic cirrhosis of liver with ascites (H) 10/11/2019    ANCA-associated vasculitis (H) 2022    C. difficile colitis     Coronary artery disease     [facility] 4/2023 - complete occlusion of RCA    ESRD (end stage renal disease) on dialysis (H)     Gout     HCC (hepatocellular carcinoma) (H) 9/5/2023    History of hemochromatosis 10/11/2019    Hypertension     IgA nephropathy     Obesity     PAF (paroxysmal atrial fibrillation) (H)     Pre-diabetes 2023    Psoriatic arthritis (H)     RA (rheumatoid arthritis) (H)     Status post kidney transplant 06/06/2023    Induction with thymoglobulin 4mg/kg, + DSA CW9    Status post liver transplantation (H) 06/06/2023        Past Surgical History:   Past Surgical History:   Procedure Laterality Date    APPENDECTOMY      Removed at 18 Years Old     BENCH KIDNEY  06/06/2023    Procedure: Bench kidney;  Surgeon: Chad Clifton MD;  Location:  OR    [facility] LIVER  06/06/2023    Procedure: Bench liver;  Surgeon: Chad Clifton MD;  Location:  OR    COLONOSCOPY      2014 at [facility]     CV CORONARY ANGIOGRAM N/A 04/27/2023    Procedure: Coronary Angiogram;  Surgeon: Pablo Araujo MD;  Location:  HEART CARDIAC CATH LAB    EYE SURGERY Bilateral     Cataract    H STATISTIC PICC LINE INSERTION >5YR, FAILED Left 06/16/2023    Unable to advance catheter over the axillary area    HERNIA REPAIR      History of bilateral inguinal hernia repair: 10/28/2014. Open hernia repair: 10/2017. Abdominal wound exploration and debridement 12/27/2017    INSERT SHUNT PORTAL TRANSJUGULAR INTRAHEPTIC  09/26/2022    IR CVC NON TUNNEL LINE REMOVAL  7/3/2023    IR CVC NON TUNNEL PLACEMENT > 5 YRS  6/14/2023    IR CVC TUNNEL PLACEMENT > 5 YRS OF AGE  01/26/2023    IR PICC PLACEMENT > 5 YRS OF AGE  6/16/2023    IR RENAL BIOPSY RIGHT  6/20/2023    RETURN LIVER TRANSPLANT N/A 06/06/2023    Procedure: Return  liver transplant. Intra-operative ultrasound;  Surgeon: Chad Clifton MD;  Location: [facility] OR    TRANSPLANT KIDNEY RECIPIENT  DONOR N/A 2023    Procedure: Transplant kidney recipient  donor, ureteral stent placement;  Surgeon: Chad Clifton MD;  Location: [facility] OR    TRANSPLANT LIVER RECIPIENT  DONOR N/A 2023    Procedure: Transplant liver recipient  donor;  Surgeon: Chad Clifton MD;  Location: [facility] OR        Family History:   Family History   Problem Relation Age of Onset    Lung Cancer Mother     Colon Cancer Father     Lung Cancer Brother     Heart Failure Brother     Kidney Disease Brother         Social History:   Social History     Socioeconomic History    Marital status:      Spouse name: Not on file    Number of children: Not on file    Years of education: Not on file    Highest education level: Not on file   Occupational History    Not on file   Tobacco Use    Smoking status: Never     Passive exposure: Never    Smokeless tobacco: Never   Vaping Use    Vaping Use: Never used   Substance and Sexual Activity    Alcohol use: Not Currently     Comment: Last ETOH use was 2021    Drug use: Not Currently    Sexual activity: Not on file   Other Topics Concern    Parent/sibling w/ CABG, MI or angioplasty before 65F 55M? Not Asked   Social History Narrative    Not on file     Social Determinants of Health     Financial Resource Strain: Not on file   Food Insecurity: Not on file   Transportation Needs: Not on file   Physical Activity: Not on file   Stress: Not on file   Social Connections: Not on file   Interpersonal Safety: Not on file   Housing Stability: Not on file            Lab Results    Chemistry/lipid CBC Cardiac Enzymes/BNP/TSH/INR   Lab Results   Component Value Date    CHOL 285 (H) 2022    HDL 79 2022    TRIG 96 2022    BUN 14.5 2023     2023    CO2 25 2023    Lab Results   Component Value  Date    WBC 3.1 (L) 11/21/2023    HGB 12.2 (L) 11/21/2023    HCT 40.1 11/21/2023    MCV 93 11/21/2023     11/21/2023    Lab Results   Component Value Date    TSH 0.73 09/20/2023    INR 1.40 (H) 11/22/2023

## 2023-11-28 NOTE — LETTER
11/28/2023    Gary Serrano MD  [address] [facility] [address]    RE: Damion Quinones       Dear Colleague,     I had the pleasure of seeing Damion Quinones in the [facility].    [facility]   [address]  [place] [address]   [email or web address]   OFFICE: [phone]     CARDIOLOGY CLINIC NOTE     Thank you, Dr. Serrano, Gary BROWN, for asking the [facility] team to see Mr. Damion Quinones to  Follow Up        Assessment/Recommendations   Assessment:    Coronary artery disease with  of RCA and preserved LVEF - class I-II angina. Stable.  Permanent atrial fibrillation - rate controlled. Asymptomatic. On Eliquis for stroke prevention.  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  H/o DVT, likely provoked.  Erectile dysfunction - likely multifactorial. From a cardiac standpoint he is likely safe to resume sexual activity.    Plan:  Increase atorvastatin to 40mg daily  Start tadalafil 5-10 mg as needed 30 minutes prior to sexual intercourse.  Continue other medications without changes  Encouraged regular exercise  Okay to proceed with upcoming herniorrhaphy without additional cardiac evaluation.  Follow up in about 6 months.    Total time spent on the day of the encounter doing chart review, patient visit, documentation was 42 min         History of Present Illness   Mr. Damion Quinones is a 68 year old male with a significant past history of coronary artery disease, atrial fibrillation, alcoholic cirrhosis c/b hepatic encephalopathy, ESRD on HD status post combined liver/kidney transplant who presents to establish cardiac care.     was identified to have severe single vessel CAD with  of his RCA in April, 2023 as part of his pre-transplant evaluation. He also has atrial fibrillation which has been present at least since 2016 and has been persistent for the past  several years. He is on long-term oral anticoagulation for stroke prevention as well as to treat chronic DVTs. He is also s/p combined liver/kidney transplant on 6/6/23.     Casey presents again with his wife, Nabila. He continues to feel well, now 6 months after his liver/kidney transplant. He is riding a stationary bicycle 30 minutes every other day without anginal symptoms. He again asks about safety of sexual activity and whether he could take medication for erectile dysfunction.    Other than noted above, Mr. Quinones denies any chest pain/pressure/tightness, shortness of breath at rest or with exertion, light headedness/dizziness, pre-syncope, syncope, lower extremity swelling, palpitations, paroxysmal nocturnal dyspnea (PND), or orthopnea.     Cardiac Problems and Cardiac Diagnostics     Most Recent Cardiac testing:  ECG dated 11/21/23 (personaly reviewed and interpreted): atrial fibrillation, controlled heart rate at 74 bpm.    ECHO (report reviewed):   TTE 6/5/23  Interpretation Summary  Technically difficult study. The patient's rhythm is atrial fibrillation.  Global and regional left ventricular function is normal with an EF of 55-60%.  Global right ventricular function is normal.  No significant valvular abnormalities present.  IVC diameter <2.1 cm collapsing >50% with sniff suggests a normal RA pressure  of 3 mmHg.  No pericardial effusion is present.  No significant changes noted.    Cardiac cath: from 4/27/23 demonstrated   Left Main   The vessel is moderate in size and is angiographically normal.      Left Anterior Descending   The vessel is moderate in size.   Prox LAD lesion is 30% stenosed.      First Diagonal Branch   The vessel is moderate in size.   1st Diag lesion is 50% stenosed.      First Septal Branch   The vessel is small.      Second Diagonal Branch   The vessel is small.   2nd Diag lesion is 50% stenosed.      Second Septal Branch   The vessel is small.      Third Diagonal Branch   The vessel  is small and is angiographically normal.      Left Circumflex   The vessel is moderate in size and is angiographically normal.      First Obtuse Marginal Branch   The vessel is large and is angiographically normal.      Lateral First Obtuse Marginal Branch   The vessel is moderate in size.      Second Obtuse Marginal Branch   The vessel is small and is angiographically normal.      Right Coronary Artery   The vessel is moderate in size.   Ost RCA to Mid RCA lesion is 50% stenosed.   Mid RCA lesion is 100% stenosed.   Mid RCA to Dist RCA lesion is 50% stenosed.      Right Posterior Descending Artery   The vessel is small.   Collaterals   RPDA filled by collaterals from Dist LAD.         Right Posterior Atrioventricular Artery   The vessel is small and is angiographically normal.   Collaterals   RPAV filled by collaterals from Dist Cx.         First Right Posterolateral Branch   The vessel is small and is angiographically normal.      Second Right Posterolateral Branch   The vessel is small and is angiographically normal.                  Medications  Allergies   Current Outpatient Medications   Medication Sig Dispense Refill    Alcohol Swabs PADS Use to swab the area of the injection or quyen as directed Per insurance coverage 100 each 0    allopurinol (ZYLOPRIM) 100 MG tablet Take 300 mg by mouth daily 180 tablet 3    anakinra (KINERET) 100 MG/0.67ML SOSY injection Inject 0.67 mLs (100 mg) Subcutaneous daily as needed      anakinra (KINERET) 100 MG/0.67ML SOSY injection Inject 0.67 mLs (100 mg) Subcutaneous daily for 5 days 3.35 mL 0    apixaban ANTICOAGULANT (ELIQUIS ANTICOAGULANT) 5 MG tablet Take 1 tablet (5 mg) by mouth 2 times daily 180 tablet 3    atorvastatin (LIPITOR) 20 MG tablet Take 1 tablet (20 mg) by mouth every evening 30 tablet 1    gabapentin (NEURONTIN) 100 MG capsule Take 1 capsule (100 mg) by mouth 3 times daily for 90 days 270 capsule 3    levothyroxine (SYNTHROID/LEVOTHROID) 88 MCG tablet Take 1  "tablet (88 mcg) by mouth daily 30 tablet 3    magnesium oxide (MAG-OX) 400 MG tablet Take 2 tablets (800 mg) by mouth 2 times daily 60 tablet 0    metoprolol tartrate (LOPRESSOR) 50 MG tablet Take 1 tablet (50 mg) by mouth 2 times daily for 90 days 180 tablet 3    multivitamin w/minerals (THERA-VIT-M) tablet Take 1 tablet by mouth daily 30 tablet 0    MYFORTIC (BRAND) 180 MG EC tablet Take 3 tablets (540 mg) by mouth 2 times daily 540 tablet 3    pantoprazole (PROTONIX) 40 MG EC tablet Take 1 tablet (40 mg) by mouth every morning (before breakfast) 90 tablet 3    predniSONE (DELTASONE) 5 MG tablet Take 5 mg by mouth daily 135 tablet 3    Sharps Container MISC Use as directed to dispose of needles, lancets and other sharps 1 each 0    simethicone (MYLICON) 125 MG chewable tablet Take 125 mg by mouth 4 times daily as needed for intestinal gas      sulfamethoxazole-trimethoprim (BACTRIM) 400-80 MG tablet Take 1 tablet by mouth daily 90 tablet 3    tacrolimus (GENERIC) 0.5 MG capsule Take 1 capsule (0.5 mg) by mouth every 12 hours Total dose: 1.5mg  capsule 3    tacrolimus (GENERIC) 1 MG capsule Take 1 capsule (1 mg) by mouth every 12 hours Total dose: 1.5mg  capsule 3    ursodiol (ACTIGALL) 250 MG tablet Take 1 tablet (250 mg) by mouth 2 times daily 180 tablet 3      No Known Allergies     Physical Examination Review of Systems   Vitals: BP (!) 146/84 (BP Location: Right arm, Patient Position: Sitting, Cuff Size: Adult Large)   Pulse 64   Resp 16   Ht 1.702 m (5' 7\")   Wt 109.8 kg (242 lb 1.6 oz)   BMI 37.92 kg/m    BMI= Body mass index is 37.92 kg/m .  Wt Readings from Last 3 Encounters:   11/28/23 109.8 kg (242 lb 1.6 oz)   11/09/23 107.5 kg (237 lb)   11/06/23 102.1 kg (225 lb)       General: pleasant male. No acute distress.   HENT: external ears normal. Nares patent. Mucous membranes moist.  Eyes: perrla, extraocular muscles intact. No scleral icterus.   Neck: No JVD  Lungs: clear to " auscultation  COR: mildly fast rate, irregularly irregular rhythm, No murmurs, rubs, or gallops  Abd: nondistended, BS present  Extrem: No edema        Please refer above for cardiac ROS details.       Past History   Past Medical History:   Past Medical History:   Diagnosis Date    Alcoholic cirrhosis of liver with ascites (H) 10/11/2019    ANCA-associated vasculitis (H) 2022    C. difficile colitis     Coronary artery disease     [facility] 4/2023 - complete occlusion of RCA    ESRD (end stage renal disease) on dialysis (H)     Gout     HCC (hepatocellular carcinoma) (H) 9/5/2023    History of hemochromatosis 10/11/2019    Hypertension     IgA nephropathy     Obesity     PAF (paroxysmal atrial fibrillation) (H)     Pre-diabetes 2023    Psoriatic arthritis (H)     RA (rheumatoid arthritis) (H)     Status post kidney transplant 06/06/2023    Induction with thymoglobulin 4mg/kg, + DSA CW9    Status post liver transplantation (H) 06/06/2023        Past Surgical History:   Past Surgical History:   Procedure Laterality Date    APPENDECTOMY      Removed at 18 Years Old     BENCH KIDNEY  06/06/2023    Procedure: Bench kidney;  Surgeon: Chad Clifton MD;  Location:  OR    BENCH LIVER  06/06/2023    Procedure: Bench liver;  Surgeon: Chad Clifton MD;  Location:  OR    COLONOSCOPY      2014 at [facility]     CV CORONARY ANGIOGRAM N/A 04/27/2023    Procedure: Coronary Angiogram;  Surgeon: Pablo Araujo MD;  Location:  HEART CARDIAC CATH LAB    EYE SURGERY Bilateral     Cataract    H STATISTIC PICC LINE INSERTION >5YR, FAILED Left 06/16/2023    Unable to advance catheter over the axillary area    HERNIA REPAIR      History of bilateral inguinal hernia repair: 10/28/2014. Open hernia repair: 10/2017. Abdominal wound exploration and debridement 12/27/2017    INSERT SHUNT PORTAL TRANSJUGULAR INTRAHEPTIC  09/26/2022    IR CVC NON TUNNEL LINE REMOVAL  7/3/2023    IR CVC NON TUNNEL PLACEMENT > 5  YRS  2023    IR CVC TUNNEL PLACEMENT > 5 YRS OF AGE  2023    IR PICC PLACEMENT > 5 YRS OF AGE  2023    IR RENAL BIOPSY RIGHT  2023    RETURN LIVER TRANSPLANT N/A 2023    Procedure: Return liver transplant. Intra-operative ultrasound;  Surgeon: Chad Clifton MD;  Location: [facility] OR    TRANSPLANT KIDNEY RECIPIENT  DONOR N/A 2023    Procedure: Transplant kidney recipient  donor, ureteral stent placement;  Surgeon: Chad Clifton MD;  Location: [facility] OR    TRANSPLANT LIVER RECIPIENT  DONOR N/A 2023    Procedure: Transplant liver recipient  donor;  Surgeon: Chad Clifton MD;  Location: [facility] OR        Family History:   Family History   Problem Relation Age of Onset    Lung Cancer Mother     Colon Cancer Father     Lung Cancer Brother     Heart Failure Brother     Kidney Disease Brother         Social History:   Social History     Socioeconomic History    Marital status:      Spouse name: Not on file    Number of children: Not on file    Years of education: Not on file    Highest education level: Not on file   Occupational History    Not on file   Tobacco Use    Smoking status: Never     Passive exposure: Never    Smokeless tobacco: Never   Vaping Use    Vaping Use: Never used   Substance and Sexual Activity    Alcohol use: Not Currently     Comment: Last ETOH use was 2021    Drug use: Not Currently    Sexual activity: Not on file   Other Topics Concern    Parent/sibling w/ CABG, MI or angioplasty before 65F 55M? Not Asked   Social History Narrative    Not on file     Social Determinants of Health     Financial Resource Strain: Not on file   Food Insecurity: Not on file   Transportation Needs: Not on file   Physical Activity: Not on file   Stress: Not on file   Social Connections: Not on file   Interpersonal Safety: Not on file   Housing Stability: Not on file            Lab Results    Chemistry/lipid CBC Cardiac  Enzymes/BNP/TSH/INR   Lab Results   Component Value Date    CHOL 285 (H) 11/22/2022    HDL 79 11/22/2022    TRIG 96 11/22/2022    BUN 14.5 11/21/2023     11/21/2023    CO2 25 11/21/2023    Lab Results   Component Value Date    WBC 3.1 (L) 11/21/2023    HGB 12.2 (L) 11/21/2023    HCT 40.1 11/21/2023    MCV 93 11/21/2023     11/21/2023    Lab Results   Component Value Date    TSH 0.73 09/20/2023    INR 1.40 (H) 11/22/2023                Thank you for allowing me to participate in the care of your patient.      Sincerely,     Phillip Marrero MD     [facility]  cc:   Phillip Marrero MD  [address], [address]  [place] [address]

## 2023-11-28 NOTE — PATIENT INSTRUCTIONS
It was a pleasure to meet with you today.      Below is a summary of your visit.   Increase your atorvastatin to 40 mg every evening.  You can try taking tadalafil 5-10 mg about 30 minutes prior to sexual activity.  Continue your other medications without changes  I encourage regular exercise as tolerated. The more you do, the better.  Follow up with me in 6 months or sooner if needed.     Please do not hesitate to call the [facility] [employer] [facility] with any questions or concerns at [phone].     Sincerely,

## 2023-11-29 ENCOUNTER — OFFICE VISIT (OUTPATIENT)
Dept: NEUROLOGY | Facility: CLINIC | Age: 68
End: 2023-11-29
Attending: INTERNAL MEDICINE
Payer: COMMERCIAL

## 2023-11-29 ENCOUNTER — PRE VISIT (OUTPATIENT)
Dept: NEUROLOGY | Facility: CLINIC | Age: 68
End: 2023-11-29
Payer: MEDICARE

## 2023-11-29 VITALS
DIASTOLIC BLOOD PRESSURE: 77 MMHG | HEART RATE: 88 BPM | OXYGEN SATURATION: 100 % | WEIGHT: 236 LBS | SYSTOLIC BLOOD PRESSURE: 129 MMHG | BODY MASS INDEX: 36.96 KG/M2

## 2023-11-29 DIAGNOSIS — M47.27 OSTEOARTHRITIS OF SPINE WITH RADICULOPATHY, LUMBOSACRAL REGION: ICD-10-CM

## 2023-11-29 DIAGNOSIS — G62.9 NEUROPATHY: Primary | ICD-10-CM

## 2023-11-29 DIAGNOSIS — Z94.0 KIDNEY REPLACED BY TRANSPLANT: ICD-10-CM

## 2023-11-29 PROCEDURE — 99205 OFFICE O/P NEW HI 60 MIN: CPT | Mod: GC | Performed by: PSYCHIATRY & NEUROLOGY

## 2023-11-29 RX ORDER — TRIAMCINOLONE ACETONIDE 1 MG/G
OINTMENT TOPICAL 2 TIMES DAILY
COMMUNITY
Start: 2023-11-08 | End: 2024-03-25

## 2023-11-29 RX ORDER — ALLOPURINOL 300 MG/1
300 TABLET ORAL DAILY
COMMUNITY
Start: 2023-11-24

## 2023-11-29 RX ORDER — CAPSAICIN 0.025 %
CREAM (GRAM) TOPICAL
Qty: 50 G | Refills: 3 | Status: SHIPPED | OUTPATIENT
Start: 2023-11-29 | End: 2024-03-25

## 2023-11-29 ASSESSMENT — PAIN SCALES - GENERAL: PAINLEVEL: MODERATE PAIN (4)

## 2023-11-29 NOTE — LETTER
11/29/2023       RE: Damion Quinones   [address] [facility] [address]     Dear Colleague,    Thank you for referring your patient, Damion Quinones, to the [facility] [place] at [facility]. Please see a copy of my visit note below.    Chief Complaint: Numbness and paresthesias of the hands and feet    History of Present Illness:    Damion Quinones is a 68 year old man who we are seeing at the request of Subhash Dutta MD for evaluation of numbness and paresthesias of the hands and feet    In terms of his comorbidities, he has been diagnosed with psoriatic arthritis for several years and was taking adalimumab (Humira) for about 6 to 8 years prior to August 2022.  At that time he started to have complications that led to his diagnosis of renal failure unrelated to adalimumab.  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  He stopped adalimumab and was then switched to prednisone 40 mg daily, and has been weaned down to 5 mg daily in the last year.  During work-up for his renal failure he was also found to have a liver lesion that was identified as hepatocellular carcinoma and cirrhosis, likely related to previous chronic alcohol use.  He was pending the transplant list for about a year, and just recently underwent liver transplant in June 2023.  Since that time he has been on CellCept and tacrolimus, as well as taking prednisone 5 mg daily for his kidneys.    He has had foot and ankle pain that has been complicated by gout and psoriatic arthritis for several years as above.  However, he also  developed new paresthesias and numbness of the bottom of his feet starting in spring 2022.  In fact he saw [facility] orthopedics for a second opinion of the pain of the foot and ankle joints, as well as these paresthesias.  He did not follow through on this work-up due to the complications  of kidneys and liver as above.    This sensory change including numbness and paresthesias of the feet is now worse since June 2023 after his transplant.  Additionally, he now has pain and paresthesias of his hands bilaterally, and new weakness of the right arm.  In fact during recovery he found himself using his left arm more frequently related to this weakness of the right side, as well as joint pain of the right side.  He endorses some subjective weakness of the hands, but relates most of this to pain and discomfort of the joints and numbness of the fingertips.  He otherwise feels coordinated with the hands and feet.  Although, he does think walking is a little bit more difficult due to balance and numbness, but denies falls.  He has more troubles with showering, especially when closing his eyes in the shower.  Denies any bulbar dysfunction such as changes with chewing, swallowing, choking, shortness of breath.  Endorses possible orthopnea but has raise the head of his bed for several years now.    Prior pertinent laboratory work-up:  2022: Myeloperoxidase antibodies (ANCA) 39 (<3.5 normal)  11/21/2023: Normal SPEP, immunofixation, B12   9/20/2023: A1c 5.2    Prior electrophysiologic work-up:  none    Past Medical History:   Past Medical History:   Diagnosis Date    Alcoholic cirrhosis of liver with ascites (H) 10/11/2019    ANCA-associated vasculitis (H) 2022    C. difficile colitis     Coronary artery disease     [facility] 4/2023 - complete occlusion of RCA    ESRD (end stage renal disease) on dialysis (H)     Gout     HCC (hepatocellular carcinoma) (H) 9/5/2023    History of hemochromatosis 10/11/2019    Hypertension     IgA nephropathy     Obesity     PAF (paroxysmal atrial fibrillation) (H)     Pre-diabetes 2023    Psoriatic arthritis (H)     RA (rheumatoid arthritis) (H)     Status post kidney transplant 06/06/2023    Induction with thymoglobulin 4mg/kg, + DSA CW9    Status post liver transplantation (H)  2023      Past Surgical History:  Past Surgical History:   Procedure Laterality Date    APPENDECTOMY      Removed at 18 Years Old     BENCH KIDNEY  2023    Procedure: Bench kidney;  Surgeon: Chad Clifton MD;  Location:  OR    BENCH LIVER  2023    Procedure: Bench liver;  Surgeon: Chad Clifton MD;  Location:  OR    COLONOSCOPY      2014 at [facility].     CV CORONARY ANGIOGRAM N/A 2023    Procedure: Coronary Angiogram;  Surgeon: Pablo Araujo MD;  Location:  HEART CARDIAC CATH LAB    EYE SURGERY Bilateral     Cataract    H STATISTIC PICC LINE INSERTION >5YR, FAILED Left 2023    Unable to advance catheter over the axillary area    HERNIA REPAIR      History of bilateral inguinal hernia repair: 10/28/2014. Open hernia repair: 10/2017. Abdominal wound exploration and debridement 2017    INSERT SHUNT PORTAL TRANSJUGULAR INTRAHEPTIC  2022    IR CVC NON TUNNEL LINE REMOVAL  7/3/2023    IR CVC NON TUNNEL PLACEMENT > 5 YRS  2023    IR CVC TUNNEL PLACEMENT > 5 YRS OF AGE  2023    IR PICC PLACEMENT > 5 YRS OF AGE  2023    IR RENAL BIOPSY RIGHT  2023    RETURN LIVER TRANSPLANT N/A 2023    Procedure: Return liver transplant. Intra-operative ultrasound;  Surgeon: Chad Clifton MD;  Location: [facility] OR    TRANSPLANT KIDNEY RECIPIENT  DONOR N/A 2023    Procedure: Transplant kidney recipient  donor, ureteral stent placement;  Surgeon: Chad Clifton MD;  Location:  OR    TRANSPLANT LIVER RECIPIENT  DONOR N/A 2023    Procedure: Transplant liver recipient  donor;  Surgeon: Chad Clifton MD;  Location:  OR     Family history:    There is no known family history of hereditary neuropathies or other neuromuscular disorders.    Social History:    He denies tobacco, alcohol, or illicit drug use. There is no known exposure to toxins or heavy metals.     Medical  Allergies:  NKDA     Current Medications:   Current Outpatient Medications:     Alcohol Swabs PADS, Use to swab the area of the injection or quyen as directed Per insurance coverage, Disp: 100 each, Rfl: 0    allopurinol (ZYLOPRIM) 100 MG tablet, Take 300 mg by mouth daily, Disp: 180 tablet, Rfl: 3    allopurinol (ZYLOPRIM) 300 MG tablet, Take 1 tablet by mouth daily at 2 pm, Disp: , Rfl:     anakinra (KINERET) 100 MG/0.67ML SOSY injection, Inject 0.67 mLs (100 mg) Subcutaneous daily as needed, Disp: , Rfl:     apixaban ANTICOAGULANT (ELIQUIS ANTICOAGULANT) 5 MG tablet, Take 1 tablet (5 mg) by mouth 2 times daily, Disp: 180 tablet, Rfl: 3    atorvastatin (LIPITOR) 40 MG tablet, Take 1 tablet (40 mg) by mouth every evening, Disp: 90 tablet, Rfl: 3    capsaicin (ZOSTRIX) 0.025 % external cream, Apply to feet three times per day, Disp: 50 g, Rfl: 3    gabapentin (NEURONTIN) 100 MG capsule, Take 1 capsule (100 mg) by mouth 3 times daily for 90 days, Disp: 270 capsule, Rfl: 3    levothyroxine (SYNTHROID/LEVOTHROID) 88 MCG tablet, Take 1 tablet (88 mcg) by mouth daily, Disp: 30 tablet, Rfl: 3    magnesium oxide (MAG-OX) 400 MG tablet, Take 2 tablets (800 mg) by mouth 2 times daily, Disp: 60 tablet, Rfl: 0    metoprolol tartrate (LOPRESSOR) 50 MG tablet, Take 1 tablet (50 mg) by mouth 2 times daily for 90 days, Disp: 180 tablet, Rfl: 3    multivitamin w/minerals (THERA-VIT-M) tablet, Take 1 tablet by mouth daily, Disp: 30 tablet, Rfl: 0    MYFORTIC (BRAND) 180 MG EC tablet, Take 3 tablets (540 mg) by mouth 2 times daily, Disp: 540 tablet, Rfl: 3    pantoprazole (PROTONIX) 40 MG EC tablet, Take 1 tablet (40 mg) by mouth every morning (before breakfast), Disp: 90 tablet, Rfl: 3    predniSONE (DELTASONE) 5 MG tablet, Take 5 mg by mouth daily, Disp: 135 tablet, Rfl: 3    Sharps Container MISC, Use as directed to dispose of needles, lancets and other sharps, Disp: 1 each, Rfl: 0    simethicone (MYLICON) 125 MG chewable tablet,  Take 125 mg by mouth 4 times daily as needed for intestinal gas, Disp: , Rfl:     sulfamethoxazole-trimethoprim (BACTRIM) 400-80 MG tablet, Take 1 tablet by mouth daily, Disp: 90 tablet, Rfl: 3    tacrolimus (GENERIC) 0.5 MG capsule, Take 1 capsule (0.5 mg) by mouth every 12 hours Total dose: 1.5mg BID, Disp: 180 capsule, Rfl: 3    tacrolimus (GENERIC) 1 MG capsule, Take 1 capsule (1 mg) by mouth every 12 hours Total dose: 1.5mg BID, Disp: 180 capsule, Rfl: 3    tadalafil (CIALIS) 5 MG tablet, Take 1 tablet (5 mg) by mouth daily as needed (take 1-2 tablets at least 30 minutes prior to sexual activity.), Disp: 20 tablet, Rfl: 5    triamcinolone (KENALOG) 0.1 % external ointment, Apply topically 2 times daily, Disp: , Rfl:     ursodiol (ACTIGALL) 250 MG tablet, Take 1 tablet (250 mg) by mouth 2 times daily, Disp: 180 tablet, Rfl: 3    anakinra (KINERET) 100 MG/0.67ML SOSY injection, Inject 0.67 mLs (100 mg) Subcutaneous daily for 5 days, Disp: 3.35 mL, Rfl: 0      Review of Systems: A complete review of systems was obtained and was negative except for what was noted above.    Physical examination:    /77 (BP Location: Right arm, Patient Position: Right side, Cuff Size: Adult Regular)   Pulse 88   Wt 107 kg (236 lb)   SpO2 100%   BMI 36.96 kg/m       General Appearance: NAD    Skin: Psoriatic skin changes    Neurologic examination:    Mental status:  Patient is alert, attentive, and oriented x 3.  Language is coherent and fluent without aphasia.  Memory, comprehension and ability to follow commands were intact.       Cranial nerves:  Pupils were round and reacted to light.  Extraocular movements were full. There was no face, jaw, palate or tongue weakness or atrophy. Hearing was grossly intact.  Shoulder shrug was normal.      Motor exam: No atrophy or fasciculations.  Manual muscle testing revealed the following MRC grade muscle power:   Right Left   Neck flexion 5    Neck extension: 5    Shoulder  abduction:  5 5   Elbow extension: 4+ 5   Elbow flexion:  4+ 5   Wrist flexion:  5 5   Wrist extension:  5 5   Finger extension 5 5   FDI 5 5   Hip flexion 5 5   Knee flexion 5 5   Knee extension 5 5   Dorsiflexion 5 5   Plantar flexion 5 5     Complex motor skills: No tremor or ataxia    Sensory:  Vibration: 17s in fingertips; 13s at base of toes  Proprioception: Normal in toes  Pinprick: Normal throughout  Romberg: Sways without fall    Gait: Slightly wide based and antalgic favoring Left hip; otherwise normal stride length and turn.       Deep tendon reflexes:   Right Left   Triceps 2 2   Biceps 2 2   Brachioradialis 2 2   Knee jerk 0* 0*   Ankle jerk 0* 0*   *Complicated by bilateral knee arthoplasties and very swollen ankles from gout/psoriatic arthritis  Plantar responses were flexor bilaterally.       Assessment:    Damion Quinones is a 68 year old man with relatively complex medical history including psoriatic arthritis on several years of adalimumab (Humira), IgA and ANCA vasculitis causing nephropathy and renal failure currently on 5 mg prednisone daily, and alcoholic cirrhosis complicated by hepatocellular carcinoma s/p liver transplant currently taking mycophenolate and tacrolimus.  Neuropathy includes numbness of the hands and feet.  It is likely that underlying risk for neuropathy are contributing which include chronic kidney disease and chronic alcohol use.  However, there are rare and possibly treatable causes of superimposed neuropathy that include effects of tacrolimus, GVHD, and ANCA vasculitis involving the nerves.  There is currently no other systemic concern for GVHD which makes this unlikely.  Additionally, ANCA vasculitis seems to have been controlled very well over the course of years and unlikely to be currently contributing as well.    Plan:      1. Labs: NfL  2. Nerve conduction studies/EMG: Characterize polyneuropathy  3. Symptomatic management of pain and paresthesias: Currently taking  gabapentin 100 mg 3 times daily, can increase this to goal dose of 200 mg 3 times daily with 100 mg intervals every 1 to 2 weeks.  Can also attempt topical capsaicin cream for pain management.  4.  Discussed appropriate foot hygiene recommendations  5.  Follow-up in 6 months. Sooner results pending above.    Patient seen and discussed with Dr. Delgado. His addendum follows.     Randy Patino MD  Neuromuscular Medicine Fellow, PGY-5  [facility]  ---  ADDENDUM:  I personally interviewed and examined the patient. I agree with the history, physical, assessment, and plan as documented above. Changes to the physical examination, assessment and plan have been incorporated into the note by myself, as to make it a single cohesive document. 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 He has two clear factors that put him at risk for polyneuropathy: CKD and excessive alcohol exposure. Both of these are now under control, but both likely have caused some baseline degree of polyneuropathy. What is of greater concern is the worsening of neuropathy since liver transplant. At this point his neurologic examination is relatively reassuring. Suspicion for a progressive immune or toxic (superimposed upon the alcohol and CKD neuropathy) is low, but hard to exclude. Possibilities include tacrolimus toxicity, GVHD associated neuropathy, or ANCA vasculitis neuropathy - the latter two of which are very unlikely considering the absence of other GVHD or active vasculitis findings. Tacrolimus neuropathy is typically a multifocal demyelinating condition. As a next step we will obtain NCS to better characterize the polyneuropathy. We expect to see a distal axonal sensorimotor neuropathy, which can be attributed to remote alcohol toxicity and ESRD. If multifocal demyelinating changes are  also found this would raise concern for  tacrolimus toxicity. We will also check serum NfL levels (a marker of axonal degeneration). If very high then this would raise concern for an aggressive axonal neuropathy (ie., not related to prior alcohol or ESRD). We will comment further when these studies are complete. We also discussed supportive management as above.               Again, thank you for allowing me to participate in the care of your patient.      Sincerely,    Rafael Delgado MD

## 2023-11-29 NOTE — PROGRESS NOTES
Chief Complaint: Numbness and paresthesias of the hands and feet    History of Present Illness:    Damion Quinones is a 68 year old man who we are seeing at the request of Subhash Dutta MD for evaluation of numbness and paresthesias of the hands and feet    In terms of his comorbidities, he has been diagnosed with psoriatic arthritis for several years and was taking adalimumab (Humira) for about 6 to 8 years prior to August 2022.  At that time he started to have complications that led to his diagnosis of renal failure unrelated to adalimumab.  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  He stopped adalimumab and was then switched to prednisone 40 mg daily, and has been weaned down to 5 mg daily in the last year.  During work-up for his renal failure he was also found to have a liver lesion that was identified as hepatocellular carcinoma and cirrhosis, likely related to previous chronic alcohol use.  He was pending the transplant list for about a year, and just recently underwent liver transplant in June 2023.  Since that time he has been on CellCept and tacrolimus, as well as taking prednisone 5 mg daily for his kidneys.    He has had foot and ankle pain that has been complicated by gout and psoriatic arthritis for several years as above.  However, he also  developed new paresthesias and numbness of the bottom of his feet starting in spring 2022.  In fact he saw [facility] orth[facility]ics for a second opinion of the pain of the foot and ankle joints, as well as these paresthesias.  He did not follow through on this work-up due to the complications of kidneys and liver as above.    This sensory change including numbness and paresthesias of the feet is now worse since June 2023 after his transplant.  Additionally, he now has pain and paresthesias of his hands bilaterally, and new weakness of the right arm.  In fact during recovery he found himself using his  left arm more frequently related to this weakness of the right side, as well as joint pain of the right side.  He endorses some subjective weakness of the hands, but relates most of this to pain and discomfort of the joints and numbness of the fingertips.  He otherwise feels coordinated with the hands and feet.  Although, he does think walking is a little bit more difficult due to balance and numbness, but denies falls.  He has more troubles with showering, especially when closing his eyes in the shower.  Denies any bulbar dysfunction such as changes with chewing, swallowing, choking, shortness of breath.  Endorses possible orthopnea but has raise the head of his bed for several years now.    Prior pertinent laboratory work-up:  2022: Myeloperoxidase antibodies (ANCA) 39 (<3.5 normal)  11/21/2023: Normal SPEP, immunofixation, B12   9/20/2023: A1c 5.2    Prior electrophysiologic work-up:  none    Past Medical History:   Past Medical History:   Diagnosis Date    Alcoholic cirrhosis of liver with ascites (H) 10/11/2019    ANCA-associated vasculitis (H) 2022    C. difficile colitis     Coronary artery disease     [facility] 4/2023 - complete occlusion of RCA    ESRD (end stage renal disease) on dialysis (H)     Gout     HCC (hepatocellular carcinoma) (H) 9/5/2023    History of hemochromatosis 10/11/2019    Hypertension     IgA nephropathy     Obesity     PAF (paroxysmal atrial fibrillation) (H)     Pre-diabetes 2023    Psoriatic arthritis (H)     RA (rheumatoid arthritis) (H)     Status post kidney transplant 06/06/2023    Induction with thymoglobulin 4mg/kg, + DSA CW9    Status post liver transplantation (H) 06/06/2023      Past Surgical History:  Past Surgical History:   Procedure Laterality Date    APPENDECTOMY      Removed at 18 Years Old     BENCH KIDNEY  06/06/2023    Procedure: Bench kidney;  Surgeon: Chad Clifton MD;  Location:  OR    BENCH LIVER  06/06/2023    Procedure: Bench liver;  Surgeon: Etienne  MD Chad;  Location:  OR    COLONOSCOPY       at [facility].     CV CORONARY ANGIOGRAM N/A 2023    Procedure: Coronary Angiogram;  Surgeon: Pablo Araujo MD;  Location:  HEART CARDIAC CATH LAB    EYE SURGERY Bilateral     Cataract    H STATISTIC PICC LINE INSERTION >5YR, FAILED Left 2023    Unable to advance catheter over the axillary area    HERNIA REPAIR      History of bilateral inguinal hernia repair: 10/28/2014. Open hernia repair: 10/2017. Abdominal wound exploration and debridement 2017    INSERT SHUNT PORTAL TRANSJUGULAR INTRAHEPTIC  2022    IR CVC NON TUNNEL LINE REMOVAL  7/3/2023    IR CVC NON TUNNEL PLACEMENT > 5 YRS  2023    IR CVC TUNNEL PLACEMENT > 5 YRS OF AGE  2023    IR PICC PLACEMENT > 5 YRS OF AGE  2023    IR RENAL BIOPSY RIGHT  2023    RETURN LIVER TRANSPLANT N/A 2023    Procedure: Return liver transplant. Intra-operative ultrasound;  Surgeon: Chad Clifton MD;  Location: [facility] OR    TRANSPLANT KIDNEY RECIPIENT  DONOR N/A 2023    Procedure: Transplant kidney recipient  donor, ureteral stent placement;  Surgeon: Chad Clifton MD;  Location: [facility] OR    TRANSPLANT LIVER RECIPIENT  DONOR N/A 2023    Procedure: Transplant liver recipient  donor;  Surgeon: Chad Clifton MD;  Location:  OR     Family history:    There is no known family history of hereditary neuropathies or other neuromuscular disorders.    Social History:    He denies tobacco, alcohol, or illicit drug use. There is no known exposure to toxins or heavy metals.     Medical Allergies:  NKDA     Current Medications:   Current Outpatient Medications:     Alcohol Swabs PADS, Use to swab the area of the injection or quyen as directed Per insurance coverage, Disp: 100 each, Rfl: 0    allopurinol (ZYLOPRIM) 100 MG tablet, Take 300 mg by mouth daily, Disp: 180 tablet, Rfl: 3    allopurinol (ZYLOPRIM)  300 MG tablet, Take 1 tablet by mouth daily at 2 pm, Disp: , Rfl:     anakinra (KINERET) 100 MG/0.67ML SOSY injection, Inject 0.67 mLs (100 mg) Subcutaneous daily as needed, Disp: , Rfl:     apixaban ANTICOAGULANT (ELIQUIS ANTICOAGULANT) 5 MG tablet, Take 1 tablet (5 mg) by mouth 2 times daily, Disp: 180 tablet, Rfl: 3    atorvastatin (LIPITOR) 40 MG tablet, Take 1 tablet (40 mg) by mouth every evening, Disp: 90 tablet, Rfl: 3    capsaicin (ZOSTRIX) 0.025 % external cream, Apply to feet three times per day, Disp: 50 g, Rfl: 3    gabapentin (NEURONTIN) 100 MG capsule, Take 1 capsule (100 mg) by mouth 3 times daily for 90 days, Disp: 270 capsule, Rfl: 3    levothyroxine (SYNTHROID/LEVOTHROID) 88 MCG tablet, Take 1 tablet (88 mcg) by mouth daily, Disp: 30 tablet, Rfl: 3    magnesium oxide (MAG-OX) 400 MG tablet, Take 2 tablets (800 mg) by mouth 2 times daily, Disp: 60 tablet, Rfl: 0    metoprolol tartrate (LOPRESSOR) 50 MG tablet, Take 1 tablet (50 mg) by mouth 2 times daily for 90 days, Disp: 180 tablet, Rfl: 3    multivitamin w/minerals (THERA-VIT-M) tablet, Take 1 tablet by mouth daily, Disp: 30 tablet, Rfl: 0    MYFORTIC (BRAND) 180 MG EC tablet, Take 3 tablets (540 mg) by mouth 2 times daily, Disp: 540 tablet, Rfl: 3    pantoprazole (PROTONIX) 40 MG EC tablet, Take 1 tablet (40 mg) by mouth every morning (before breakfast), Disp: 90 tablet, Rfl: 3    predniSONE (DELTASONE) 5 MG tablet, Take 5 mg by mouth daily, Disp: 135 tablet, Rfl: 3    Sharps Container MISC, Use as directed to dispose of needles, lancets and other sharps, Disp: 1 each, Rfl: 0    simethicone (MYLICON) 125 MG chewable tablet, Take 125 mg by mouth 4 times daily as needed for intestinal gas, Disp: , Rfl:     sulfamethoxazole-trimethoprim (BACTRIM) 400-80 MG tablet, Take 1 tablet by mouth daily, Disp: 90 tablet, Rfl: 3    tacrolimus (GENERIC) 0.5 MG capsule, Take 1 capsule (0.5 mg) by mouth every 12 hours Total dose: 1.5mg BID, Disp: 180 capsule,  Rfl: 3    tacrolimus (GENERIC) 1 MG capsule, Take 1 capsule (1 mg) by mouth every 12 hours Total dose: 1.5mg BID, Disp: 180 capsule, Rfl: 3    tadalafil (CIALIS) 5 MG tablet, Take 1 tablet (5 mg) by mouth daily as needed (take 1-2 tablets at least 30 minutes prior to sexual activity.), Disp: 20 tablet, Rfl: 5    triamcinolone (KENALOG) 0.1 % external ointment, Apply topically 2 times daily, Disp: , Rfl:     ursodiol (ACTIGALL) 250 MG tablet, Take 1 tablet (250 mg) by mouth 2 times daily, Disp: 180 tablet, Rfl: 3    anakinra (KINERET) 100 MG/0.67ML SOSY injection, Inject 0.67 mLs (100 mg) Subcutaneous daily for 5 days, Disp: 3.35 mL, Rfl: 0      Review of Systems: A complete review of systems was obtained and was negative except for what was noted above.    Physical examination:    /77 (BP Location: Right arm, Patient Position: Right side, Cuff Size: Adult Regular)   Pulse 88   Wt 107 kg (236 lb)   SpO2 100%   BMI 36.96 kg/m       General Appearance: NAD    Skin: Psoriatic skin changes    Neurologic examination:    Mental status:  Patient is alert, attentive, and oriented x 3.  Language is coherent and fluent without aphasia.  Memory, comprehension and ability to follow commands were intact.       Cranial nerves:  Pupils were round and reacted to light.  Extraocular movements were full. There was no face, jaw, palate or tongue weakness or atrophy. Hearing was grossly intact.  Shoulder shrug was normal.      Motor exam: No atrophy or fasciculations.  Manual muscle testing revealed the following MRC grade muscle power:   Right Left   Neck flexion 5    Neck extension: 5    Shoulder abduction:  5 5   Elbow extension: 4+ 5   Elbow flexion:  4+ 5   Wrist flexion:  5 5   Wrist extension:  5 5   Finger extension 5 5   FDI 5 5   Hip flexion 5 5   Knee flexion 5 5   Knee extension 5 5   Dorsiflexion 5 5   Plantar flexion 5 5     Complex motor skills: No tremor or ataxia    Sensory:  Vibration: 17s in fingertips; 13s  at base of toes  Proprioception: Normal in toes  Pinprick: Normal throughout  Romberg: Sways without fall    Gait: Slightly wide based and antalgic favoring Left hip; otherwise normal stride length and turn.       Deep tendon reflexes:   Right Left   Triceps 2 2   Biceps 2 2   Brachioradialis 2 2   Knee jerk 0* 0*   Ankle jerk 0* 0*   *Complicated by bilateral knee arthoplasties and very swollen ankles from gout/psoriatic arthritis  Plantar responses were flexor bilaterally.       Assessment:    Damion Quinones is a 68 year old man with relatively complex medical history including psoriatic arthritis on several years of adalimumab (Humira), IgA and ANCA vasculitis causing nephropathy and renal failure currently on 5 mg prednisone daily, and alcoholic cirrhosis complicated by hepatocellular carcinoma s/p liver transplant currently taking mycophenolate and tacrolimus.  Neuropathy includes numbness of the hands and feet.  It is likely that underlying risk for neuropathy are contributing which include chronic kidney disease and chronic alcohol use.  However, there are rare and possibly treatable causes of superimposed neuropathy that include effects of tacrolimus, GVHD, and ANCA vasculitis involving the nerves.  There is currently no other systemic concern for GVHD which makes this unlikely.  Additionally, ANCA vasculitis seems to have been controlled very well over the course of years and unlikely to be currently contributing as well.    Plan:      1. Labs: NfL  2. Nerve conduction studies/EMG: Characterize polyneuropathy  3. Symptomatic management of pain and paresthesias: Currently taking gabapentin 100 mg 3 times daily, can increase this to goal dose of 200 mg 3 times daily with 100 mg intervals every 1 to 2 weeks.  Can also attempt topical capsaicin cream for pain management.  4.  Discussed appropriate foot hygiene recommendations  5.  Follow-up in 6 months. Sooner results pending above.    Patient seen and discussed  with Dr. Delgado. His addendum follows.     Randy Patino MD  Neuromuscular Medicine Fellow, PGY-5  [facility]  ---  ADDENDUM:  I personally interviewed and examined the patient. I agree with the history, physical, assessment, and plan as documented above. Changes to the physical examination, assessment and plan have been incorporated into the note by myself, as to make it a single cohesive document. 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 He has two clear factors that put him at risk for polyneuropathy: CKD and excessive alcohol exposure. Both of these are now under control, but both likely have caused some baseline degree of polyneuropathy. What is of greater concern is the worsening of neuropathy since liver transplant. At this point his neurologic examination is relatively reassuring. Suspicion for a progressive immune or toxic (superimposed upon the alcohol and CKD neuropathy) is low, but hard to exclude. Possibilities include tacrolimus toxicity, GVHD associated neuropathy, or ANCA vasculitis neuropathy - the latter two of which are very unlikely considering the absence of other GVHD or active vasculitis findings. Tacrolimus neuropathy is typically a multifocal demyelinating condition. As a next step we will obtain NCS to better characterize the polyneuropathy. We expect to see a distal axonal sensorimotor neuropathy, which can be attributed to remote alcohol toxicity and ESRD. If multifocal demyelinating changes are  also found this would raise concern for tacrolimus toxicity. We will also check serum NfL levels (a marker of axonal degeneration). If very high then this would raise concern for an aggressive axonal neuropathy (ie., not related to prior alcohol or ESRD). We will comment further when these studies are complete. We also discussed supportive management as above.         Rafael  Danny BELLO    12/5/23: NFL normal (21.0). Argues against active/aggressive destructive axonal disorder.     1/31/24: NCS/EMG showed 1) Moderate axonal, length-dependant sensorimotor polyneuropathy; 2) Left-sided active on chronic S1 radiculopathy; 3) Probable right-sided chronic S1 radiculopathy; 4) Right-sided peroneal neuropathy across the knee. The polyneuropathy is what we would expect from CKD and alcohol toxicity. The focal peroneal neuropathy at the knee is most likely from compression. I advised him to avoid leg cross or other positions that can put pressure on that nerve. He does not do that much if at all, but has had both knees replaced. Perhaps the nerve was injured during that surgery. For the radic, he does have low back pain that radiates into his left leg. Will explore this more with LS MRI. If it confirms S1 compression then will get him a referral to our spine team.      2/10/24: MRI LS spine showed no abnormal signal or enhancement within the lumbar spinal cord or cauda equina. Lumbar spondylosis greatest at L5-S1 with moderate right and severe left neural foraminal stenosis. Mild spinal canal stenosis at L2-L3. Periarticular facet edema at L5-S1. Neuro foraminal stenosis, especially at left L5-S1 may explain patient's radiculopathy and pain. Communicated results by [employer]. Offered referral to spine team.

## 2023-11-29 NOTE — NURSING NOTE
Chief Complaint   Patient presents with    RECHECK       Vitals were taken and medications were reconciled.    Shakira Diop, Technician  8:21 AM  November 29, 2023

## 2023-11-30 ENCOUNTER — INFUSION THERAPY VISIT (OUTPATIENT)
Dept: INFUSION THERAPY | Facility: CLINIC | Age: 68
End: 2023-11-30
Attending: INTERNAL MEDICINE
Payer: COMMERCIAL

## 2023-11-30 VITALS
OXYGEN SATURATION: 95 % | RESPIRATION RATE: 18 BRPM | SYSTOLIC BLOOD PRESSURE: 148 MMHG | HEART RATE: 85 BPM | DIASTOLIC BLOOD PRESSURE: 97 MMHG | TEMPERATURE: 98 F

## 2023-11-30 DIAGNOSIS — D63.8 ANEMIA IN OTHER CHRONIC DISEASES CLASSIFIED ELSEWHERE: ICD-10-CM

## 2023-11-30 DIAGNOSIS — Z94.0 KIDNEY REPLACED BY TRANSPLANT: ICD-10-CM

## 2023-11-30 DIAGNOSIS — Z94.4 LIVER REPLACED BY TRANSPLANT (H): ICD-10-CM

## 2023-11-30 DIAGNOSIS — R79.89 LOW SERUM PHOSPHATE: Primary | ICD-10-CM

## 2023-11-30 PROCEDURE — 96365 THER/PROPH/DIAG IV INF INIT: CPT

## 2023-11-30 PROCEDURE — 250N000011 HC RX IP 250 OP 636: Mod: JZ | Performed by: INTERNAL MEDICINE

## 2023-11-30 PROCEDURE — 258N000003 HC RX IP 258 OP 636: Performed by: INTERNAL MEDICINE

## 2023-11-30 RX ORDER — ALBUTEROL SULFATE 0.83 MG/ML
2.5 SOLUTION RESPIRATORY (INHALATION)
Status: CANCELLED | OUTPATIENT
Start: 2023-12-06

## 2023-11-30 RX ORDER — DIPHENHYDRAMINE HYDROCHLORIDE 50 MG/ML
50 INJECTION INTRAMUSCULAR; INTRAVENOUS
Status: DISCONTINUED | OUTPATIENT
Start: 2023-11-30 | End: 2023-11-30 | Stop reason: HOSPADM

## 2023-11-30 RX ORDER — DIPHENHYDRAMINE HYDROCHLORIDE 50 MG/ML
50 INJECTION INTRAMUSCULAR; INTRAVENOUS
Status: CANCELLED
Start: 2023-12-06

## 2023-11-30 RX ORDER — HEPARIN SODIUM (PORCINE) LOCK FLUSH IV SOLN 100 UNIT/ML 100 UNIT/ML
5 SOLUTION INTRAVENOUS
Status: CANCELLED | OUTPATIENT
Start: 2023-12-06

## 2023-11-30 RX ORDER — HEPARIN SODIUM,PORCINE 10 UNIT/ML
5-20 VIAL (ML) INTRAVENOUS DAILY PRN
Status: CANCELLED | OUTPATIENT
Start: 2023-12-06

## 2023-11-30 RX ORDER — MEPERIDINE HYDROCHLORIDE 50 MG/ML
25 INJECTION INTRAMUSCULAR; INTRAVENOUS; SUBCUTANEOUS EVERY 30 MIN PRN
Status: CANCELLED | OUTPATIENT
Start: 2023-12-06

## 2023-11-30 RX ORDER — METHYLPREDNISOLONE SODIUM SUCCINATE 125 MG/2ML
125 INJECTION, POWDER, LYOPHILIZED, FOR SOLUTION INTRAMUSCULAR; INTRAVENOUS
Status: CANCELLED
Start: 2023-12-06

## 2023-11-30 RX ORDER — ALBUTEROL SULFATE 90 UG/1
1-2 AEROSOL, METERED RESPIRATORY (INHALATION)
Status: CANCELLED
Start: 2023-12-06

## 2023-11-30 RX ORDER — ALBUTEROL SULFATE 90 UG/1
1-2 AEROSOL, METERED RESPIRATORY (INHALATION)
Status: DISCONTINUED | OUTPATIENT
Start: 2023-11-30 | End: 2023-11-30 | Stop reason: HOSPADM

## 2023-11-30 RX ORDER — ALBUTEROL SULFATE 0.83 MG/ML
2.5 SOLUTION RESPIRATORY (INHALATION)
Status: DISCONTINUED | OUTPATIENT
Start: 2023-11-30 | End: 2023-11-30 | Stop reason: HOSPADM

## 2023-11-30 RX ORDER — EPINEPHRINE 1 MG/ML
0.3 INJECTION, SOLUTION INTRAMUSCULAR; SUBCUTANEOUS EVERY 5 MIN PRN
Status: CANCELLED | OUTPATIENT
Start: 2023-12-06

## 2023-11-30 RX ORDER — MEPERIDINE HYDROCHLORIDE 50 MG/ML
25 INJECTION INTRAMUSCULAR; INTRAVENOUS; SUBCUTANEOUS EVERY 30 MIN PRN
Status: DISCONTINUED | OUTPATIENT
Start: 2023-11-30 | End: 2023-11-30 | Stop reason: HOSPADM

## 2023-11-30 RX ORDER — EPINEPHRINE 1 MG/ML
0.3 INJECTION, SOLUTION INTRAMUSCULAR; SUBCUTANEOUS EVERY 5 MIN PRN
Status: DISCONTINUED | OUTPATIENT
Start: 2023-11-30 | End: 2023-11-30 | Stop reason: HOSPADM

## 2023-11-30 RX ORDER — METHYLPREDNISOLONE SODIUM SUCCINATE 125 MG/2ML
125 INJECTION, POWDER, LYOPHILIZED, FOR SOLUTION INTRAMUSCULAR; INTRAVENOUS
Status: DISCONTINUED | OUTPATIENT
Start: 2023-11-30 | End: 2023-11-30 | Stop reason: HOSPADM

## 2023-11-30 RX ADMIN — FERRIC CARBOXYMALTOSE INJECTION 750 MG: 50 INJECTION, SOLUTION INTRAVENOUS at 13:47

## 2023-11-30 NOTE — PROGRESS NOTES
Infusion Nursing Note:  Damion Quinones presents today for Injectafer.    Patient seen by provider today: No   present during visit today: Not Applicable.    Note: Patient states that he has been tolerating his iron infusions well. Denies any new concerns.      Intravenous Access:  Peripheral IV placed.    Treatment Conditions:  Not Applicable.      Post Infusion Assessment:  Patient tolerated infusion without incident.  Patient observed for 30 minutes post Injectafer infusion per protocol.       Discharge Plan:   Patient and/or family verbalized understanding of discharge instructions and all questions answered.      Judit Martin RN

## 2023-12-05 ENCOUNTER — ANESTHESIA EVENT (OUTPATIENT)
Dept: SURGERY | Facility: CLINIC | Age: 68
End: 2023-12-05
Payer: COMMERCIAL

## 2023-12-05 ENCOUNTER — LAB (OUTPATIENT)
Dept: LAB | Facility: CLINIC | Age: 68
End: 2023-12-05
Payer: COMMERCIAL

## 2023-12-05 DIAGNOSIS — G62.9 NEUROPATHY: ICD-10-CM

## 2023-12-05 DIAGNOSIS — Z94.0 KIDNEY REPLACED BY TRANSPLANT: ICD-10-CM

## 2023-12-05 DIAGNOSIS — N18.2 ANEMIA IN STAGE 2 CHRONIC KIDNEY DISEASE: ICD-10-CM

## 2023-12-05 DIAGNOSIS — D63.1 ANEMIA IN STAGE 2 CHRONIC KIDNEY DISEASE: ICD-10-CM

## 2023-12-05 DIAGNOSIS — Z48.298 AFTERCARE FOLLOWING ORGAN TRANSPLANT: ICD-10-CM

## 2023-12-05 DIAGNOSIS — Z94.4 LIVER TRANSPLANT RECIPIENT (H): ICD-10-CM

## 2023-12-05 DIAGNOSIS — Z94.0 KIDNEY TRANSPLANTED: ICD-10-CM

## 2023-12-05 DIAGNOSIS — C22.0 HCC (HEPATOCELLULAR CARCINOMA) (H): ICD-10-CM

## 2023-12-05 DIAGNOSIS — Z79.899 ENCOUNTER FOR LONG-TERM CURRENT USE OF MEDICATION: ICD-10-CM

## 2023-12-05 LAB
AFP SERPL-MCNC: 6.5 NG/ML
BASOPHILS # BLD AUTO: 0 10E3/UL (ref 0–0.2)
BASOPHILS NFR BLD AUTO: 0 %
EOSINOPHIL # BLD AUTO: 0.1 10E3/UL (ref 0–0.7)
EOSINOPHIL NFR BLD AUTO: 2 %
HCT VFR BLD AUTO: 41.9 % (ref 40–53)
HGB BLD-MCNC: 12.7 G/DL (ref 13.3–17.7)
IMM GRANULOCYTES # BLD: 0.1 10E3/UL
IMM GRANULOCYTES NFR BLD: 2 %
LYMPHOCYTES # BLD AUTO: 1 10E3/UL (ref 0.8–5.3)
LYMPHOCYTES NFR BLD AUTO: 31 %
Lab: NORMAL
MAGNESIUM SERPL-MCNC: 1.7 MG/DL (ref 1.7–2.3)
MONOCYTES # BLD AUTO: 0.3 10E3/UL (ref 0–1.3)
MONOCYTES NFR BLD AUTO: 9 %
NEUTROPHILS # BLD AUTO: 1.8 10E3/UL (ref 1.6–8.3)
NEUTROPHILS NFR BLD AUTO: 56 %
PERFORMING LABORATORY: NORMAL
PHOSPHATE SERPL-MCNC: 2 MG/DL (ref 2.5–4.5)
SPECIMEN STATUS: NORMAL
TEST NAME: NORMAL
WBC # BLD AUTO: 3.2 10E3/UL (ref 4–11)

## 2023-12-05 PROCEDURE — 85048 AUTOMATED LEUKOCYTE COUNT: CPT

## 2023-12-05 PROCEDURE — 36415 COLL VENOUS BLD VENIPUNCTURE: CPT

## 2023-12-05 PROCEDURE — 85018 HEMOGLOBIN: CPT | Mod: QW

## 2023-12-05 PROCEDURE — 83735 ASSAY OF MAGNESIUM: CPT

## 2023-12-05 PROCEDURE — 82105 ALPHA-FETOPROTEIN SERUM: CPT

## 2023-12-05 PROCEDURE — 85014 HEMATOCRIT: CPT

## 2023-12-05 PROCEDURE — 83520 IMMUNOASSAY QUANT NOS NONAB: CPT

## 2023-12-05 PROCEDURE — 85004 AUTOMATED DIFF WBC COUNT: CPT

## 2023-12-05 PROCEDURE — 84100 ASSAY OF PHOSPHORUS: CPT

## 2023-12-06 LAB
CMV DNA SPEC NAA+PROBE-ACNC: NOT DETECTED IU/ML
MAYO MISC RESULT: NORMAL

## 2023-12-06 ASSESSMENT — ENCOUNTER SYMPTOMS: DYSRHYTHMIAS: 1

## 2023-12-06 NOTE — ANESTHESIA PREPROCEDURE EVALUATION
Anesthesia Pre-Procedure Evaluation    Patient: Damion Quinones   MRN: [id number] : 1955        Procedure : Procedure(s):  HERNIORRHAPHY, UMBILICAL, OPEN          Past Medical History:   Diagnosis Date    Alcoholic cirrhosis of liver with ascites (H) 10/11/2019    ANCA-associated vasculitis (H)     Antiplatelet or antithrombotic long-term use     C. difficile colitis     Coronary artery disease     [facility] 2023 - complete occlusion of RCA    ESRD (end stage renal disease) on dialysis (H)     Gout     HCC (hepatocellular carcinoma) (H) 2023    History of hemochromatosis 10/11/2019    Hypertension     IgA nephropathy     Obesity     PAF (paroxysmal atrial fibrillation) (H)     Pre-diabetes     Psoriatic arthritis (H)     RA (rheumatoid arthritis) (H)     Status post kidney transplant 2023    Induction with thymoglobulin 4mg/kg, + DSA CW9    Status post liver transplantation (H) 2023      Past Surgical History:   Procedure Laterality Date    APPENDECTOMY      Removed at 18 Years Old     BENCH KIDNEY  2023    Procedure: Bench kidney;  Surgeon: Chad Clifton MD;  Location:  OR    BENCH LIVER  2023    Procedure: Bench liver;  Surgeon: Chad Clifton MD;  Location:  OR    COLONOSCOPY      2014 at [facility]     CV CORONARY ANGIOGRAM N/A 2023    Procedure: Coronary Angiogram;  Surgeon: Pablo Araujo MD;  Location:  HEART CARDIAC CATH LAB    EYE SURGERY Bilateral     Cataract    H STATISTIC PICC LINE INSERTION >5YR, FAILED Left 2023    Unable to advance catheter over the axillary area    HERNIA REPAIR      History of bilateral inguinal hernia repair: 10/28/2014. Open hernia repair: 10/2017. Abdominal wound exploration and debridement 2017    INSERT SHUNT PORTAL TRANSJUGULAR INTRAHEPTIC  2022    IR CVC NON TUNNEL LINE REMOVAL  7/3/2023    IR CVC NON TUNNEL PLACEMENT > 5 YRS  2023    IR CVC TUNNEL PLACEMENT > 5 YRS  OF AGE  2023    IR PICC PLACEMENT > 5 YRS OF AGE  2023    IR RENAL BIOPSY RIGHT  2023    RETURN LIVER TRANSPLANT N/A 2023    Procedure: Return liver transplant. Intra-operative ultrasound;  Surgeon: Chad Clifton MD;  Location: [facility] OR    TRANSPLANT KIDNEY RECIPIENT  DONOR N/A 2023    Procedure: Transplant kidney recipient  donor, ureteral stent placement;  Surgeon: Chad Clifton MD;  Location: [facility] OR    TRANSPLANT LIVER RECIPIENT  DONOR N/A 2023    Procedure: Transplant liver recipient  donor;  Surgeon: Chad Clifton MD;  Location: [facility] OR      No Known Allergies   Social History     Tobacco Use    Smoking status: Never     Passive exposure: Never    Smokeless tobacco: Never   Substance Use Topics    Alcohol use: Not Currently     Comment: Last ETOH use was 2021      Wt Readings from Last 1 Encounters:   23 107 kg (236 lb)        Anesthesia Evaluation            ROS/MED HX  ENT/Pulmonary:       Neurologic:       Cardiovascular: Comment: Apixaban for hx of A-fib    (+)  hypertension- -  CAD -  - -   Taking blood thinners                     dysrhythmias, a-fib,             METS/Exercise Tolerance:     Hematologic:       Musculoskeletal:       GI/Hepatic: Comment: - Cirrhosis 2/2 EtOH, hemochromatosis  - Hx of Variceal bleed s/p TIPS (10/1/2022)  - Hx Hepatic encephalopathy   - Hx recurrent C. Diff  - Gastric antral vascular ectasia, Hx upper GI bleed  - MELD-Na 30 (2023)    (+)             liver disease,       Renal/Genitourinary:     (+) renal disease,    Pt has history of transplant, date: Renal, Liver (2023),        Endo:     (+)               Obesity,       Psychiatric/Substance Use: Comment: alcohol abuse (quit in 2016)    (+)   alcohol abuse      Infectious Disease:       Malignancy:       Other:            Physical Exam    Airway        Mallampati: II   TM distance: > 3 FB   Neck ROM: full     Respiratory  "Devices and Support         Dental       (+) Removable bridges or other hardware      Cardiovascular          Rhythm and rate: irregular     Pulmonary           breath sounds clear to auscultation           OUTSIDE LABS:  CBC:   Lab Results   Component Value Date    WBC 3.2 (L) 12/05/2023    WBC 3.1 (L) 11/21/2023    HGB 12.7 (L) 12/05/2023    HGB 12.2 (L) 11/21/2023    HCT 41.9 12/05/2023    HCT 40.1 11/21/2023     11/21/2023     11/07/2023     BMP:   Lab Results   Component Value Date     11/21/2023     11/07/2023    POTASSIUM 4.2 11/21/2023    POTASSIUM 4.3 11/07/2023    CHLORIDE 104 11/21/2023    CHLORIDE 104 11/07/2023    CO2 25 11/21/2023    CO2 23 11/07/2023    BUN 14.5 11/21/2023    BUN 17.1 11/07/2023    CR 1.05 11/21/2023    CR 1.06 11/07/2023    GLC 97 11/21/2023     (H) 11/07/2023     COAGS:   Lab Results   Component Value Date    PTT 46 (H) 06/18/2023    INR 1.40 (H) 11/22/2023    FIBR 926 (H) 06/20/2023     POC: No results found for: \"BGM\", \"HCG\", \"HCGS\"  HEPATIC:   Lab Results   Component Value Date    ALBUMIN 3.6 11/21/2023    PROTTOTAL 5.5 (L) 11/21/2023    ALT 14 11/21/2023    AST 19 11/21/2023     (H) 08/04/2023    ALKPHOS 130 11/21/2023    BILITOTAL 0.3 11/21/2023    DEBRA 18 06/12/2023     OTHER:   Lab Results   Component Value Date    PH 7.44 06/15/2023    LACT 1.7 09/28/2023    A1C 5.2 09/20/2023    NAZARIO 9.2 11/21/2023    PHOS 2.0 (L) 12/05/2023    MAG 1.7 12/05/2023    LIPASE 59 06/07/2023    AMYLASE 90 06/07/2023    TSH 0.73 09/20/2023    T4 0.76 (L) 09/05/2023    SED 66 (H) 09/29/2023       Anesthesia Plan    ASA Status:  3       Anesthesia Type: General.     - Airway: ETT   Induction: Intravenous, Propofol.   Maintenance: Balanced.        Consents    Anesthesia Plan(s) and associated risks, benefits, and realistic alternatives discussed. Questions answered and patient/representative(s) expressed understanding.     - Discussed: Risks, Benefits and " "Alternatives for BOTH SEDATION and the PROCEDURE were discussed     - Discussed with:  Patient, Spouse            Postoperative Care    Pain management: Multi-modal analgesia, Peripheral nerve block (Single Shot).   PONV prophylaxis: Ondansetron (or other 5HT-3), Dexamethasone or Solumedrol     Comments:               Bud Galicia MD    I have reviewed the pertinent notes and labs in the chart from the past 30 days and (re)examined the patient.  Any updates or changes from those notes are reflected in this note.        # Hypomagnesemia: Lowest Mg = 1.4 mg/dL in last 30 days, will replace as needed   # Hypoalbuminemia: Lowest albumin = 3.4 g/dL in the past 30 days , will monitor as appropriate   # Coagulation Defect: INR = 1.40 (Ref range: 0.85 - 1.15) and/or PTT = N/A, will monitor for bleeding   # Obesity: Estimated body mass index is 36.96 kg/m  as calculated from the following:    Height as of 11/28/23: 1.702 m (5' 7\").    Weight as of 11/29/23: 107 kg (236 lb).      "

## 2023-12-07 ENCOUNTER — HOSPITAL ENCOUNTER (OUTPATIENT)
Facility: CLINIC | Age: 68
Discharge: HOME | End: 2023-12-07
Attending: TRANSPLANT SURGERY | Admitting: TRANSPLANT SURGERY
Payer: COMMERCIAL

## 2023-12-07 ENCOUNTER — ANESTHESIA (OUTPATIENT)
Dept: SURGERY | Facility: CLINIC | Age: 68
End: 2023-12-07
Payer: COMMERCIAL

## 2023-12-07 VITALS
OXYGEN SATURATION: 96 % | DIASTOLIC BLOOD PRESSURE: 101 MMHG | SYSTOLIC BLOOD PRESSURE: 139 MMHG | RESPIRATION RATE: 18 BRPM | HEART RATE: 73 BPM | TEMPERATURE: 97.8 F | BODY MASS INDEX: 36.96 KG/M2 | WEIGHT: 235.45 LBS | HEIGHT: 67 IN

## 2023-12-07 DIAGNOSIS — Z98.890 H/O HERNIA REPAIR: Primary | ICD-10-CM

## 2023-12-07 DIAGNOSIS — Z94.0 KIDNEY REPLACED BY TRANSPLANT: ICD-10-CM

## 2023-12-07 DIAGNOSIS — Z98.890 S/P HERNIORRHAPHY: Primary | ICD-10-CM

## 2023-12-07 DIAGNOSIS — Z94.4 LIVER REPLACED BY TRANSPLANT (H): ICD-10-CM

## 2023-12-07 DIAGNOSIS — Z87.19 S/P HERNIORRHAPHY: Primary | ICD-10-CM

## 2023-12-07 DIAGNOSIS — Z87.19 H/O HERNIA REPAIR: Primary | ICD-10-CM

## 2023-12-07 PROCEDURE — 49615 RPR AA HRN RCR 3-10 RDC: CPT | Mod: GC | Performed by: TRANSPLANT SURGERY

## 2023-12-07 PROCEDURE — 250N000011 HC RX IP 250 OP 636: Performed by: STUDENT IN AN ORGANIZED HEALTH CARE EDUCATION/TRAINING PROGRAM

## 2023-12-07 PROCEDURE — 250N000009 HC RX 250

## 2023-12-07 PROCEDURE — 250N000011 HC RX IP 250 OP 636: Mod: JZ | Performed by: STUDENT IN AN ORGANIZED HEALTH CARE EDUCATION/TRAINING PROGRAM

## 2023-12-07 PROCEDURE — 0WUF0JZ SUPPLEMENT ABDOMINAL WALL WITH SYNTHETIC SUBSTITUTE, OPEN APPROACH: ICD-10-PCS | Performed by: TRANSPLANT SURGERY

## 2023-12-07 PROCEDURE — 250N000011 HC RX IP 250 OP 636: Performed by: NURSE PRACTITIONER

## 2023-12-07 PROCEDURE — 250N000025 HC SEVOFLURANE, PER MIN: Performed by: TRANSPLANT SURGERY

## 2023-12-07 PROCEDURE — 250N000011 HC RX IP 250 OP 636

## 2023-12-07 PROCEDURE — 710N000012 HC RECOVERY PHASE 2, PER MINUTE: Performed by: TRANSPLANT SURGERY

## 2023-12-07 PROCEDURE — C9290 INJ, BUPIVACAINE LIPOSOME: HCPCS | Performed by: STUDENT IN AN ORGANIZED HEALTH CARE EDUCATION/TRAINING PROGRAM

## 2023-12-07 PROCEDURE — 360N000075 HC SURGERY LEVEL 2, PER MIN: Performed by: TRANSPLANT SURGERY

## 2023-12-07 PROCEDURE — 370N000017 HC ANESTHESIA TECHNICAL FEE, PER MIN: Performed by: TRANSPLANT SURGERY

## 2023-12-07 PROCEDURE — 999N000141 HC STATISTIC PRE-PROCEDURE NURSING ASSESSMENT: Performed by: TRANSPLANT SURGERY

## 2023-12-07 PROCEDURE — 710N000010 HC RECOVERY PHASE 1, LEVEL 2, PER MIN: Performed by: TRANSPLANT SURGERY

## 2023-12-07 PROCEDURE — 272N000001 HC OR GENERAL SUPPLY STERILE: Performed by: TRANSPLANT SURGERY

## 2023-12-07 PROCEDURE — 258N000003 HC RX IP 258 OP 636

## 2023-12-07 PROCEDURE — C1781 MESH (IMPLANTABLE): HCPCS | Performed by: TRANSPLANT SURGERY

## 2023-12-07 DEVICE — PHASIX ST MESH, 15 CM X 20 CM (6" X 8"), RECTANGLE
Type: IMPLANTABLE DEVICE | Site: ABDOMEN | Status: FUNCTIONAL
Brand: PHASIX

## 2023-12-07 RX ORDER — SODIUM CHLORIDE, SODIUM LACTATE, POTASSIUM CHLORIDE, CALCIUM CHLORIDE 600; 310; 30; 20 MG/100ML; MG/100ML; MG/100ML; MG/100ML
INJECTION, SOLUTION INTRAVENOUS CONTINUOUS PRN
Status: DISCONTINUED | OUTPATIENT
Start: 2023-12-07 | End: 2023-12-07

## 2023-12-07 RX ORDER — ONDANSETRON 4 MG/1
4 TABLET, ORALLY DISINTEGRATING ORAL EVERY 30 MIN PRN
Status: CANCELLED | OUTPATIENT
Start: 2023-12-07

## 2023-12-07 RX ORDER — FENTANYL CITRATE 50 UG/ML
INJECTION, SOLUTION INTRAMUSCULAR; INTRAVENOUS PRN
Status: DISCONTINUED | OUTPATIENT
Start: 2023-12-07 | End: 2023-12-07

## 2023-12-07 RX ORDER — SODIUM CHLORIDE, SODIUM LACTATE, POTASSIUM CHLORIDE, CALCIUM CHLORIDE 600; 310; 30; 20 MG/100ML; MG/100ML; MG/100ML; MG/100ML
INJECTION, SOLUTION INTRAVENOUS CONTINUOUS
Status: DISCONTINUED | OUTPATIENT
Start: 2023-12-07 | End: 2023-12-07 | Stop reason: HOSPADM

## 2023-12-07 RX ORDER — OXYCODONE HYDROCHLORIDE 5 MG/1
5 TABLET ORAL
Status: CANCELLED | OUTPATIENT
Start: 2023-12-07

## 2023-12-07 RX ORDER — FLUMAZENIL 0.1 MG/ML
0.2 INJECTION, SOLUTION INTRAVENOUS
Status: DISCONTINUED | OUTPATIENT
Start: 2023-12-07 | End: 2023-12-07 | Stop reason: HOSPADM

## 2023-12-07 RX ORDER — VANCOMYCIN HYDROCHLORIDE 1 G/200ML
1000 INJECTION, SOLUTION INTRAVENOUS
Status: COMPLETED | OUTPATIENT
Start: 2023-12-07 | End: 2023-12-07

## 2023-12-07 RX ORDER — OXYCODONE HYDROCHLORIDE 5 MG/1
5 TABLET ORAL EVERY 6 HOURS PRN
Qty: 12 TABLET | Refills: 0 | Status: ON HOLD | OUTPATIENT
Start: 2023-12-07 | End: 2023-12-20

## 2023-12-07 RX ORDER — PIPERACILLIN SODIUM, TAZOBACTAM SODIUM 2; .25 G/10ML; G/10ML
2.25 INJECTION, POWDER, LYOPHILIZED, FOR SOLUTION INTRAVENOUS ONCE
Status: COMPLETED | OUTPATIENT
Start: 2023-12-07 | End: 2023-12-07

## 2023-12-07 RX ORDER — IBUPROFEN 600 MG/1
600 TABLET, FILM COATED ORAL EVERY 6 HOURS PRN
Qty: 100 TABLET | Refills: 0 | Status: SHIPPED | OUTPATIENT
Start: 2023-12-07 | End: 2023-12-07

## 2023-12-07 RX ORDER — CIPROFLOXACIN 500 MG/1
500 TABLET, FILM COATED ORAL 2 TIMES DAILY
Qty: 10 TABLET | Refills: 0 | Status: ON HOLD | OUTPATIENT
Start: 2023-12-07 | End: 2023-12-20

## 2023-12-07 RX ORDER — ACETAMINOPHEN 500 MG
500 TABLET ORAL EVERY 6 HOURS PRN
Qty: 100 TABLET | Refills: 0 | Status: SHIPPED | OUTPATIENT
Start: 2023-12-07 | End: 2023-12-28

## 2023-12-07 RX ORDER — HYDROMORPHONE HYDROCHLORIDE 1 MG/ML
0.2 INJECTION, SOLUTION INTRAMUSCULAR; INTRAVENOUS; SUBCUTANEOUS EVERY 5 MIN PRN
Status: DISCONTINUED | OUTPATIENT
Start: 2023-12-07 | End: 2023-12-07 | Stop reason: HOSPADM

## 2023-12-07 RX ORDER — FENTANYL CITRATE 50 UG/ML
25 INJECTION, SOLUTION INTRAMUSCULAR; INTRAVENOUS EVERY 5 MIN PRN
Status: DISCONTINUED | OUTPATIENT
Start: 2023-12-07 | End: 2023-12-07 | Stop reason: HOSPADM

## 2023-12-07 RX ORDER — ONDANSETRON 2 MG/ML
4 INJECTION INTRAMUSCULAR; INTRAVENOUS EVERY 30 MIN PRN
Status: CANCELLED | OUTPATIENT
Start: 2023-12-07

## 2023-12-07 RX ORDER — DEXAMETHASONE SODIUM PHOSPHATE 4 MG/ML
INJECTION, SOLUTION INTRA-ARTICULAR; INTRALESIONAL; INTRAMUSCULAR; INTRAVENOUS; SOFT TISSUE PRN
Status: DISCONTINUED | OUTPATIENT
Start: 2023-12-07 | End: 2023-12-07

## 2023-12-07 RX ORDER — PROPOFOL 10 MG/ML
INJECTION, EMULSION INTRAVENOUS PRN
Status: DISCONTINUED | OUTPATIENT
Start: 2023-12-07 | End: 2023-12-07

## 2023-12-07 RX ORDER — CEFAZOLIN SODIUM/WATER 2 G/20 ML
2 SYRINGE (ML) INTRAVENOUS
Status: DISCONTINUED | OUTPATIENT
Start: 2023-12-07 | End: 2023-12-07

## 2023-12-07 RX ORDER — FENTANYL CITRATE 50 UG/ML
25-50 INJECTION, SOLUTION INTRAMUSCULAR; INTRAVENOUS
Status: DISCONTINUED | OUTPATIENT
Start: 2023-12-07 | End: 2023-12-07 | Stop reason: HOSPADM

## 2023-12-07 RX ORDER — FENTANYL CITRATE 50 UG/ML
50 INJECTION, SOLUTION INTRAMUSCULAR; INTRAVENOUS EVERY 5 MIN PRN
Status: DISCONTINUED | OUTPATIENT
Start: 2023-12-07 | End: 2023-12-07 | Stop reason: HOSPADM

## 2023-12-07 RX ORDER — NALOXONE HYDROCHLORIDE 0.4 MG/ML
0.2 INJECTION, SOLUTION INTRAMUSCULAR; INTRAVENOUS; SUBCUTANEOUS
Status: DISCONTINUED | OUTPATIENT
Start: 2023-12-07 | End: 2023-12-07 | Stop reason: HOSPADM

## 2023-12-07 RX ORDER — ONDANSETRON 4 MG/1
4 TABLET, ORALLY DISINTEGRATING ORAL EVERY 30 MIN PRN
Status: DISCONTINUED | OUTPATIENT
Start: 2023-12-07 | End: 2023-12-07 | Stop reason: HOSPADM

## 2023-12-07 RX ORDER — LIDOCAINE HYDROCHLORIDE 20 MG/ML
INJECTION, SOLUTION INFILTRATION; PERINEURAL PRN
Status: DISCONTINUED | OUTPATIENT
Start: 2023-12-07 | End: 2023-12-07

## 2023-12-07 RX ORDER — NALOXONE HYDROCHLORIDE 0.4 MG/ML
0.4 INJECTION, SOLUTION INTRAMUSCULAR; INTRAVENOUS; SUBCUTANEOUS
Status: DISCONTINUED | OUTPATIENT
Start: 2023-12-07 | End: 2023-12-07 | Stop reason: HOSPADM

## 2023-12-07 RX ORDER — OXYCODONE HYDROCHLORIDE 10 MG/1
10 TABLET ORAL
Status: CANCELLED | OUTPATIENT
Start: 2023-12-07

## 2023-12-07 RX ORDER — ONDANSETRON 2 MG/ML
4 INJECTION INTRAMUSCULAR; INTRAVENOUS EVERY 30 MIN PRN
Status: DISCONTINUED | OUTPATIENT
Start: 2023-12-07 | End: 2023-12-07 | Stop reason: HOSPADM

## 2023-12-07 RX ORDER — BUPIVACAINE HYDROCHLORIDE 2.5 MG/ML
INJECTION, SOLUTION EPIDURAL; INFILTRATION; INTRACAUDAL
Status: COMPLETED | OUTPATIENT
Start: 2023-12-07 | End: 2023-12-07

## 2023-12-07 RX ORDER — SENNA AND DOCUSATE SODIUM 50; 8.6 MG/1; MG/1
1 TABLET, FILM COATED ORAL AT BEDTIME
Qty: 10 TABLET | Refills: 0 | Status: ON HOLD | OUTPATIENT
Start: 2023-12-07 | End: 2023-12-20

## 2023-12-07 RX ORDER — HYDROMORPHONE HYDROCHLORIDE 1 MG/ML
0.4 INJECTION, SOLUTION INTRAMUSCULAR; INTRAVENOUS; SUBCUTANEOUS EVERY 5 MIN PRN
Status: DISCONTINUED | OUTPATIENT
Start: 2023-12-07 | End: 2023-12-07 | Stop reason: HOSPADM

## 2023-12-07 RX ADMIN — BUPIVACAINE HYDROCHLORIDE 20 ML: 2.5 INJECTION, SOLUTION EPIDURAL; INFILTRATION; INTRACAUDAL; PERINEURAL at 08:56

## 2023-12-07 RX ADMIN — PROPOFOL 150 MG: 10 INJECTION, EMULSION INTRAVENOUS at 10:30

## 2023-12-07 RX ADMIN — SUGAMMADEX 200 MG: 100 INJECTION, SOLUTION INTRAVENOUS at 11:50

## 2023-12-07 RX ADMIN — FENTANYL CITRATE 50 MCG: 50 INJECTION, SOLUTION INTRAMUSCULAR; INTRAVENOUS at 12:52

## 2023-12-07 RX ADMIN — FENTANYL CITRATE 50 MCG: 50 INJECTION INTRAMUSCULAR; INTRAVENOUS at 10:58

## 2023-12-07 RX ADMIN — LIDOCAINE HYDROCHLORIDE 100 MG: 20 INJECTION, SOLUTION INFILTRATION; PERINEURAL at 10:30

## 2023-12-07 RX ADMIN — DEXAMETHASONE SODIUM PHOSPHATE 8 MG: 4 INJECTION, SOLUTION INTRA-ARTICULAR; INTRALESIONAL; INTRAMUSCULAR; INTRAVENOUS; SOFT TISSUE at 10:41

## 2023-12-07 RX ADMIN — Medication 30 MG: at 10:59

## 2023-12-07 RX ADMIN — MIDAZOLAM 2 MG: 1 INJECTION INTRAMUSCULAR; INTRAVENOUS at 10:09

## 2023-12-07 RX ADMIN — DEXMEDETOMIDINE HYDROCHLORIDE 8 MCG: 100 INJECTION, SOLUTION INTRAVENOUS at 11:11

## 2023-12-07 RX ADMIN — Medication 20 MG: at 11:34

## 2023-12-07 RX ADMIN — FENTANYL CITRATE 50 MCG: 50 INJECTION INTRAMUSCULAR; INTRAVENOUS at 10:29

## 2023-12-07 RX ADMIN — Medication 50 MG: at 10:31

## 2023-12-07 RX ADMIN — SODIUM CHLORIDE, POTASSIUM CHLORIDE, SODIUM LACTATE AND CALCIUM CHLORIDE: 600; 310; 30; 20 INJECTION, SOLUTION INTRAVENOUS at 10:20

## 2023-12-07 RX ADMIN — BUPIVACAINE 20 ML: 13.3 INJECTION, SUSPENSION, LIPOSOMAL INFILTRATION at 08:56

## 2023-12-07 RX ADMIN — PIPERACILLIN SODIUM, TAZOBACTAM SODIUM 2.25 G: 2; .25 INJECTION, POWDER, LYOPHILIZED, FOR SOLUTION INTRAVENOUS at 10:19

## 2023-12-07 RX ADMIN — HYDROMORPHONE HYDROCHLORIDE 0.5 MG: 1 INJECTION, SOLUTION INTRAMUSCULAR; INTRAVENOUS; SUBCUTANEOUS at 11:37

## 2023-12-07 RX ADMIN — FENTANYL CITRATE 50 MCG: 50 INJECTION, SOLUTION INTRAMUSCULAR; INTRAVENOUS at 08:46

## 2023-12-07 RX ADMIN — VANCOMYCIN HYDROCHLORIDE 1000 MG: 1 INJECTION, SOLUTION INTRAVENOUS at 10:09

## 2023-12-07 ASSESSMENT — ACTIVITIES OF DAILY LIVING (ADL)
ADLS_ACUITY_SCORE: 35

## 2023-12-07 NOTE — ANESTHESIA CARE TRANSFER NOTE
Patient: Damion Quinones    Procedure: Procedure(s):  HERNIORRHAPHY, UMBILICAL, OPEN, WITH MESH       Diagnosis: Hernia, umbilical [K42.9]  Diagnosis Additional Information: No value filed.    Anesthesia Type:   General     Note:    Oropharynx: oropharynx clear of all foreign objects and spontaneously breathing  Level of Consciousness: awake  Oxygen Supplementation: nasal cannula  Level of Supplemental Oxygen (L/min / FiO2): 4  Independent Airway: airway patency satisfactory and stable  Dentition: dentition unchanged  Vital Signs Stable: post-procedure vital signs reviewed and stable  Report to RN Given: handoff report given  Patient transferred to: PACU    Handoff Report: Identifed the Patient, Identified the Reponsible Provider, Reviewed the pertinent medical history, Discussed the surgical course, Reviewed Intra-OP anesthesia mangement and issues during anesthesia, Set expectations for post-procedure period and Allowed opportunity for questions and acknowledgement of understanding      Vitals:  Vitals Value Taken Time   /102 12/07/23 1207   Temp     Pulse 81 12/07/23 1209   Resp 13 12/07/23 1209   SpO2 83 % 12/07/23 1209   Vitals shown include unfiled device data.    Electronically Signed By: JADE Bello CRNA  December 7, 2023  12:10 PM

## 2023-12-07 NOTE — ANESTHESIA POSTPROCEDURE EVALUATION
Patient: Damion Quinones    Procedure: Procedure(s):  HERNIORRHAPHY, UMBILICAL, OPEN, WITH MESH       Anesthesia Type:  General    Note:  Disposition: Outpatient   Postop Pain Control: Uneventful            Sign Out: Well controlled pain   PONV: No   Neuro/Psych: Uneventful            Sign Out: Acceptable/Baseline neuro status   Airway/Respiratory: Uneventful            Sign Out: Acceptable/Baseline resp. status   CV/Hemodynamics: Uneventful            Sign Out: Acceptable CV status; No obvious hypovolemia; No obvious fluid overload   Other NRE: NONE   DID A NON-ROUTINE EVENT OCCUR?            Last vitals:  Vitals Value Taken Time   /97 12/07/23 1215   Temp 36.6  C (97.9  F) 12/07/23 1208   Pulse 80 12/07/23 1225   Resp 10 12/07/23 1225   SpO2 94 % 12/07/23 1225   Vitals shown include unfiled device data.    Electronically Signed By: Abdias Watson MD  December 7, 2023  12:25 PM

## 2023-12-07 NOTE — PROGRESS NOTES
Received message from Dr. Sandro Ratliff asking to get an appt with Dr. Clifton scheduled in about 1 week to follow up hernia surgery. Appt request placed.

## 2023-12-07 NOTE — ANESTHESIA PROCEDURE NOTES
Airway       Patient location during procedure: OR       Procedure Start/Stop Times: 12/7/2023 10:34 AM  Staff -        Other Anesthesia Staff: Arun Crum       Performed By: SRNAIndications and Patient Condition       Indications for airway management: allison-procedural       Induction type:intravenous       Mask difficulty assessment: 2 - vent by mask + OA or adjuvant +/- NMBA    Final Airway Details       Final airway type: endotracheal airway       Successful airway: ETT - single  Endotracheal Airway Details        ETT size (mm): 8.0       Cuffed: yes       Successful intubation technique: video laryngoscopy       VL Blade Size: Glidescope 4       Grade View of Cords: 1       Adjucts: stylet       Position: Right       Measured from: gums/teeth       Secured at (cm): 24       Bite block used: None    Post intubation assessment        Placement verified by: capnometry, equal breath sounds and chest rise        Number of attempts at approach: 1       Number of other approaches attempted: 0       Secured with: commercial tube issa and tape       Ease of procedure: easy       Dentition: Intact and Unchanged    Medication(s) Administered   Medication Administration Time: 12/7/2023 10:34 AM

## 2023-12-07 NOTE — DISCHARGE INSTRUCTIONS
FOLLOW UP:  - you will have follow up next week likely on Monday -  we will reach out to your transplant coordinator to help with scheduling   - If you have not heard from us regarding this appointment or have questions, please call the transplant surgery clinic    WOUND CARE:  - you have absorbable sutures in your wound which do not need to come out and will be absorbed by your body over time.   - over your incision you have steri-strips which will peel off by itself in about 2 weeks time    ACTIVITY:  - If it hurts, don't do it!  - Do not lift more than 20 pounds for 8 weeks after surgery.   - You may shower 48 hours after surgery but do not scrub incisions; simply let soapy water run over them. If you have steri-strips or glue over the incisions, these items will fall off on their own and do not need to be replaced.  - Do not soak in a bath tub or pool for 6 weeks after surgery.  - No driving while on narcotic pain medications and until you can safely twist/turn and press a gas pedal without pain.     WHEN TO CALL OR SEEK CARE:  - Please call or seek medical attention if you experience increasing abdominal pain, nausea, vomiting, increasing drainage from or spreading redness around your wounds, chills, or fever >101.5.   - During normal business hours, please contact the transplant surgery clinic  - If you are having troubles in the evenings, at night, or on weekends, call [phone] and ask to speak with surgery resident on call.    MEDICATIONS AND PAIN CONTROL:  -Hold Blood thinner for 3 days  - Please take a stool softener while taking narcotics to prevent constipation.  - We recommend you take Tylenol (acetaminophen) around the clock for baseline pain control and then oxycodone as needed for pain throughout the day. If you have been prescribed Percocet or Vicodin, be aware that they contains Tylenol along with a narcotic pain medication such as oxycodone or hydrocodone. Do not take a total of more than 3500  mg of Tylenol per 24 hours to avoid liver damage.    Contacting your Doctor -   To contact a doctor, call Dr Clifton's at [phone] ([facility]) or:  [phone] and ask for the resident on call for Surgery (answered 24 hours a day)   Emergency Department:  [facility]: [phone] 911 if you are in need of immediate or emergent help

## 2023-12-07 NOTE — OR NURSING
TAP Block performed without complications. 50mcg fentanyl given. VSS on 2L NC.  Pt tolerated well.  Will continue to monitor. Molly Senior RN on 12/7/2023 at 8:54 AM

## 2023-12-07 NOTE — ANESTHESIA PROCEDURE NOTES
"Rectus Sheath Procedure Note    Pre-Procedure   Staff -        Anesthesiologist:  Deny Avila MD       Resident/Fellow: Manny Johnson MD       Performed By: resident       Location: pre-op       Pre-Anesthestic Checklist: patient identified, IV checked, site marked, risks and benefits discussed, informed consent, monitors and equipment checked, pre-op evaluation, at physician/surgeon's request and post-op pain management  Timeout:       Correct Patient: Yes        Correct Procedure: Yes        Correct Site: Yes        Correct Position: Yes        Correct Laterality: Yes        Site Marked: Yes  Procedure Documentation  Procedure: Rectus Sheath       Diagnosis: POST OP PAIN CONTROL       Laterality: bilateral       Patient Position: supine       Skin prep: Chloraprep       Needle Type: insulated       Needle Gauge: 21.        Needle Length (Inches): 3.13        Ultrasound guided       1. Ultrasound was used to identify targeted nerve, plexus, vascular marker, or fascial plane and place a needle adjacent to it in real-time.       2. Ultrasound was used to visualize the spread of anesthetic in close proximity to the above referenced structure.       3. A permanent image is entered into the patient's record.       4. The visualized anatomic structures appeared normal.       5. There were no apparent abnormal pathologic findings.    Assessment/Narrative         The placement was negative for: blood aspirated, painful injection and site bleeding       Paresthesias: No.       Insertion/Infusion Method: Single Shot       Complications: none    Medication(s) Administered   Bupivacaine 0.25% PF (Infiltration) - Infiltration   20 mL - 12/7/2023 8:56:00 AM  Bupivacaine liposome (Exparel) 1.3% LA inj susp (Infiltration) - Infiltration   20 mL - 12/7/2023 8:56:00 AM    FOR [facility] ([facility] [place] ONLY:   Pain Team Contact information: please page the Pain Team Via [employer]. Search \"Pain\". During daytime hours, please page the " attending first. At night please page the resident first.

## 2023-12-07 NOTE — BRIEF OP NOTE
"[facility]    Brief Operative Note    Pre-operative diagnosis: Hernia, umbilical [K42.9]  Post-operative diagnosis Same as pre-operative diagnosis    Procedure: HERNIORRHAPHY, UMBILICAL, OPEN, WITH MESH, N/A - Abdomen    Surgeon: Surgeon(s) and Role:     * Chad Clifton MD - Primary     * Sandro Ratliff MD - Resident - Assisting  Anesthesia: General with Block   Estimated Blood Loss: Less than 10 ml    Drains: None  Specimens: * No specimens in log *  Findings:   Umbilical hernia. See op note for full details  Complications: None.  Implants:   Implant Name Type Inv. Item Serial No.  Lot No. LRB No. Used Action   MESH PHASIX RECONSTRUCTIVE RESORB 6X8\" [id number] - [id number] Mesh MESH PHASIX RECONSTRUCTIVE RESORB 6X8\" [id number]  CR [employer] [facility]-DAV [id number] N/A 1 Implanted       Sandro Ratliff MD PGY3  General Surgery Resident    "

## 2023-12-08 ENCOUNTER — TELEPHONE (OUTPATIENT)
Dept: TRANSPLANT | Facility: CLINIC | Age: 68
End: 2023-12-08
Payer: MEDICARE

## 2023-12-08 ENCOUNTER — APPOINTMENT (OUTPATIENT)
Dept: GENERAL RADIOLOGY | Facility: CLINIC | Age: 68
End: 2023-12-08
Attending: EMERGENCY MEDICINE
Payer: COMMERCIAL

## 2023-12-08 ENCOUNTER — APPOINTMENT (OUTPATIENT)
Dept: GENERAL RADIOLOGY | Facility: CLINIC | Age: 68
End: 2023-12-08
Payer: COMMERCIAL

## 2023-12-08 ENCOUNTER — HOSPITAL ENCOUNTER (INPATIENT)
Facility: CLINIC | Age: 68
LOS: 12 days | Discharge: HOME | End: 2023-12-20
Attending: EMERGENCY MEDICINE | Admitting: TRANSPLANT SURGERY
Payer: COMMERCIAL

## 2023-12-08 ENCOUNTER — APPOINTMENT (OUTPATIENT)
Dept: CT IMAGING | Facility: CLINIC | Age: 68
End: 2023-12-08
Attending: NURSE PRACTITIONER
Payer: COMMERCIAL

## 2023-12-08 DIAGNOSIS — Z87.19 H/O HERNIA REPAIR: ICD-10-CM

## 2023-12-08 DIAGNOSIS — B99.9 INTRA-ABDOMINAL INFECTION: ICD-10-CM

## 2023-12-08 DIAGNOSIS — Z98.890 S/P HERNIA REPAIR: ICD-10-CM

## 2023-12-08 DIAGNOSIS — Z94.4 LIVER REPLACED BY TRANSPLANT (H): ICD-10-CM

## 2023-12-08 DIAGNOSIS — Z94.0 KIDNEY REPLACED BY TRANSPLANT: Primary | ICD-10-CM

## 2023-12-08 DIAGNOSIS — Z98.890 H/O HERNIA REPAIR: ICD-10-CM

## 2023-12-08 DIAGNOSIS — G89.18 ACUTE POST-OPERATIVE PAIN: ICD-10-CM

## 2023-12-08 DIAGNOSIS — Z87.19 S/P HERNIA REPAIR: ICD-10-CM

## 2023-12-08 DIAGNOSIS — Z94.4 LIVER TRANSPLANT RECIPIENT (H): ICD-10-CM

## 2023-12-08 DIAGNOSIS — A04.72 C. DIFFICILE COLITIS: ICD-10-CM

## 2023-12-08 DIAGNOSIS — Z94.0 STATUS POST KIDNEY TRANSPLANT: ICD-10-CM

## 2023-12-08 PROBLEM — R11.2 N&V (NAUSEA AND VOMITING): Status: ACTIVE | Noted: 2023-12-08

## 2023-12-08 LAB
ACANTHOCYTES BLD QL SMEAR: ABNORMAL
ALBUMIN SERPL BCG-MCNC: 3.8 G/DL (ref 3.5–5.2)
ALP SERPL-CCNC: 133 U/L (ref 40–150)
ALT SERPL W P-5'-P-CCNC: 10 U/L (ref 0–70)
ANION GAP SERPL CALCULATED.3IONS-SCNC: 12 MMOL/L (ref 7–15)
ANION GAP SERPL CALCULATED.3IONS-SCNC: 12 MMOL/L (ref 7–15)
AST SERPL W P-5'-P-CCNC: 19 U/L (ref 0–45)
AUER BODIES BLD QL SMEAR: ABNORMAL
BASO STIPL BLD QL SMEAR: ABNORMAL
BASOPHILS # BLD AUTO: 0 10E3/UL (ref 0–0.2)
BASOPHILS NFR BLD AUTO: 0 %
BILIRUB SERPL-MCNC: 0.4 MG/DL
BITE CELLS BLD QL SMEAR: ABNORMAL
BLISTER CELLS BLD QL SMEAR: ABNORMAL
BUN SERPL-MCNC: 16.8 MG/DL (ref 8–23)
BUN SERPL-MCNC: 18.9 MG/DL (ref 8–23)
BURR CELLS BLD QL SMEAR: ABNORMAL
CA-I BLD-MCNC: 4.9 MG/DL (ref 4.4–5.2)
CALCIUM SERPL-MCNC: 8.5 MG/DL (ref 8.8–10.2)
CALCIUM SERPL-MCNC: 8.9 MG/DL (ref 8.8–10.2)
CHLORIDE SERPL-SCNC: 103 MMOL/L (ref 98–107)
CHLORIDE SERPL-SCNC: 105 MMOL/L (ref 98–107)
CPB POCT: NO
CREAT BLD-MCNC: 1.1 MG/DL (ref 0.7–1.3)
CREAT SERPL-MCNC: 0.96 MG/DL (ref 0.67–1.17)
CREAT SERPL-MCNC: 1 MG/DL (ref 0.67–1.17)
CRP SERPL-MCNC: 9.18 MG/L
DACRYOCYTES BLD QL SMEAR: ABNORMAL
DEPRECATED HCO3 PLAS-SCNC: 22 MMOL/L (ref 22–29)
DEPRECATED HCO3 PLAS-SCNC: 25 MMOL/L (ref 22–29)
EGFRCR SERPLBLD CKD-EPI 2021: 83 ML/MIN/1.73M2
EGFRCR SERPLBLD CKD-EPI 2021: 87 ML/MIN/1.73M2
EGFRCR SERPLBLD CKD-EPI 2021: >60 ML/MIN/1.73M2
ELLIPTOCYTES BLD QL SMEAR: ABNORMAL
EOSINOPHIL # BLD AUTO: 0 10E3/UL (ref 0–0.7)
EOSINOPHIL NFR BLD AUTO: 0 %
ERYTHROCYTE [DISTWIDTH] IN BLOOD BY AUTOMATED COUNT: 16.9 % (ref 10–15)
ERYTHROCYTE [DISTWIDTH] IN BLOOD BY AUTOMATED COUNT: 18.3 % (ref 10–15)
FRAGMENTS BLD QL SMEAR: ABNORMAL
GLUCOSE BLD-MCNC: 165 MG/DL (ref 70–99)
GLUCOSE SERPL-MCNC: 169 MG/DL (ref 70–99)
GLUCOSE SERPL-MCNC: 186 MG/DL (ref 70–99)
HCO3 BLDV-SCNC: 26 MMOL/L (ref 21–28)
HCO3 BLDV-SCNC: 26 MMOL/L (ref 21–28)
HCT VFR BLD AUTO: 46.8 % (ref 40–53)
HCT VFR BLD AUTO: 54.5 % (ref 40–53)
HCT VFR BLD CALC: 54 % (ref 40–53)
HGB BLD-MCNC: 14.5 G/DL (ref 13.3–17.7)
HGB BLD-MCNC: 17.1 G/DL (ref 13.3–17.7)
HGB BLD-MCNC: 18.4 G/DL (ref 13.3–17.7)
HGB C CRYSTALS: ABNORMAL
HOLD SPECIMEN: NORMAL
HOWELL-JOLLY BOD BLD QL SMEAR: ABNORMAL
IMM GRANULOCYTES # BLD: 0.1 10E3/UL
IMM GRANULOCYTES NFR BLD: 1 %
LACTATE BLD-SCNC: 3.5 MMOL/L
LACTATE SERPL-SCNC: 2.4 MMOL/L (ref 0.7–2)
LACTATE SERPL-SCNC: 2.6 MMOL/L (ref 0.7–2)
LACTATE SERPL-SCNC: 2.9 MMOL/L (ref 0.7–2)
LYMPHOCYTES # BLD AUTO: 0.3 10E3/UL (ref 0.8–5.3)
LYMPHOCYTES NFR BLD AUTO: 4 %
MAGNESIUM SERPL-MCNC: 1.4 MG/DL (ref 1.7–2.3)
MCH RBC QN AUTO: 29.1 PG (ref 26.5–33)
MCH RBC QN AUTO: 29.2 PG (ref 26.5–33)
MCHC RBC AUTO-ENTMCNC: 31 G/DL (ref 31.5–36.5)
MCHC RBC AUTO-ENTMCNC: 31.4 G/DL (ref 31.5–36.5)
MCV RBC AUTO: 93 FL (ref 78–100)
MCV RBC AUTO: 94 FL (ref 78–100)
MONOCYTES # BLD AUTO: 0.6 10E3/UL (ref 0–1.3)
MONOCYTES NFR BLD AUTO: 8 %
NEUTROPHILS # BLD AUTO: 6.2 10E3/UL (ref 1.6–8.3)
NEUTROPHILS NFR BLD AUTO: 87 %
NEUTS HYPERSEG BLD QL SMEAR: ABNORMAL
NRBC # BLD AUTO: 0 10E3/UL
NRBC BLD AUTO-RTO: 0 /100
PCO2 BLDV: 56 MM HG (ref 40–50)
PCO2 BLDV: 57 MM HG (ref 40–50)
PH BLDV: 7.27 [PH] (ref 7.32–7.43)
PH BLDV: 7.27 [PH] (ref 7.32–7.43)
PHOSPHATE SERPL-MCNC: 2.2 MG/DL (ref 2.5–4.5)
PLAT MORPH BLD: ABNORMAL
PLATELET # BLD AUTO: 197 10E3/UL (ref 150–450)
PLATELET # BLD AUTO: 287 10E3/UL (ref 150–450)
PO2 BLDV: 20 MM HG (ref 25–47)
PO2 BLDV: 22 MM HG (ref 25–47)
POLYCHROMASIA BLD QL SMEAR: SLIGHT
POTASSIUM BLD-SCNC: 4.2 MMOL/L (ref 3.4–5.3)
POTASSIUM SERPL-SCNC: 3.7 MMOL/L (ref 3.4–5.3)
POTASSIUM SERPL-SCNC: 4.3 MMOL/L (ref 3.4–5.3)
PROT SERPL-MCNC: 5.6 G/DL (ref 6.4–8.3)
RBC # BLD AUTO: 4.96 10E6/UL (ref 4.4–5.9)
RBC # BLD AUTO: 5.87 10E6/UL (ref 4.4–5.9)
RBC AGGLUT BLD QL: ABNORMAL
RBC MORPH BLD: ABNORMAL
ROULEAUX BLD QL SMEAR: ABNORMAL
SAO2 % BLDV: 24 % (ref 94–100)
SAO2 % BLDV: 29 % (ref 94–100)
SICKLE CELLS BLD QL SMEAR: ABNORMAL
SMUDGE CELLS BLD QL SMEAR: ABNORMAL
SODIUM BLD-SCNC: 139 MMOL/L (ref 133–144)
SODIUM SERPL-SCNC: 139 MMOL/L (ref 135–145)
SODIUM SERPL-SCNC: 140 MMOL/L (ref 135–145)
SPHEROCYTES BLD QL SMEAR: ABNORMAL
STOMATOCYTES BLD QL SMEAR: ABNORMAL
TARGETS BLD QL SMEAR: ABNORMAL
TOXIC GRANULES BLD QL SMEAR: ABNORMAL
TROPONIN T SERPL HS-MCNC: 85 NG/L
VARIANT LYMPHS BLD QL SMEAR: ABNORMAL
WBC # BLD AUTO: 5.9 10E3/UL (ref 4–11)
WBC # BLD AUTO: 7 10E3/UL (ref 4–11)

## 2023-12-08 PROCEDURE — 82330 ASSAY OF CALCIUM: CPT

## 2023-12-08 PROCEDURE — 99221 1ST HOSP IP/OBS SF/LOW 40: CPT | Mod: 24 | Performed by: TRANSPLANT SURGERY

## 2023-12-08 PROCEDURE — 85027 COMPLETE CBC AUTOMATED: CPT

## 2023-12-08 PROCEDURE — 250N000012 HC RX MED GY IP 250 OP 636 PS 637: Performed by: NURSE PRACTITIONER

## 2023-12-08 PROCEDURE — 250N000011 HC RX IP 250 OP 636: Mod: JZ | Performed by: NURSE PRACTITIONER

## 2023-12-08 PROCEDURE — 258N000003 HC RX IP 258 OP 636

## 2023-12-08 PROCEDURE — 96375 TX/PRO/DX INJ NEW DRUG ADDON: CPT | Performed by: EMERGENCY MEDICINE

## 2023-12-08 PROCEDURE — 250N000011 HC RX IP 250 OP 636: Performed by: EMERGENCY MEDICINE

## 2023-12-08 PROCEDURE — 74019 RADEX ABDOMEN 2 VIEWS: CPT | Mod: 26 | Performed by: RADIOLOGY

## 2023-12-08 PROCEDURE — 258N000003 HC RX IP 258 OP 636: Performed by: EMERGENCY MEDICINE

## 2023-12-08 PROCEDURE — 250N000009 HC RX 250

## 2023-12-08 PROCEDURE — 36415 COLL VENOUS BLD VENIPUNCTURE: CPT | Performed by: EMERGENCY MEDICINE

## 2023-12-08 PROCEDURE — 82803 BLOOD GASES ANY COMBINATION: CPT

## 2023-12-08 PROCEDURE — 250N000011 HC RX IP 250 OP 636

## 2023-12-08 PROCEDURE — 99285 EMERGENCY DEPT VISIT HI MDM: CPT | Performed by: EMERGENCY MEDICINE

## 2023-12-08 PROCEDURE — 80053 COMPREHEN METABOLIC PANEL: CPT | Performed by: EMERGENCY MEDICINE

## 2023-12-08 PROCEDURE — 250N000013 HC RX MED GY IP 250 OP 250 PS 637: Performed by: NURSE PRACTITIONER

## 2023-12-08 PROCEDURE — 84484 ASSAY OF TROPONIN QUANT: CPT

## 2023-12-08 PROCEDURE — 74018 RADEX ABDOMEN 1 VIEW: CPT | Mod: 26 | Performed by: RADIOLOGY

## 2023-12-08 PROCEDURE — 86140 C-REACTIVE PROTEIN: CPT | Performed by: EMERGENCY MEDICINE

## 2023-12-08 PROCEDURE — 36415 COLL VENOUS BLD VENIPUNCTURE: CPT | Performed by: SURGERY

## 2023-12-08 PROCEDURE — 83605 ASSAY OF LACTIC ACID: CPT | Performed by: NURSE PRACTITIONER

## 2023-12-08 PROCEDURE — 96374 THER/PROPH/DIAG INJ IV PUSH: CPT | Performed by: EMERGENCY MEDICINE

## 2023-12-08 PROCEDURE — G1010 CDSM STANSON: HCPCS | Mod: GC | Performed by: RADIOLOGY

## 2023-12-08 PROCEDURE — 74019 RADEX ABDOMEN 2 VIEWS: CPT

## 2023-12-08 PROCEDURE — 3E043XZ INTRODUCTION OF VASOPRESSOR INTO CENTRAL VEIN, PERCUTANEOUS APPROACH: ICD-10-PCS | Performed by: TRANSPLANT SURGERY

## 2023-12-08 PROCEDURE — 258N000003 HC RX IP 258 OP 636: Performed by: NURSE PRACTITIONER

## 2023-12-08 PROCEDURE — 120N000003 HC R&B IMCU UMMC

## 2023-12-08 PROCEDURE — 71045 X-RAY EXAM CHEST 1 VIEW: CPT

## 2023-12-08 PROCEDURE — 96361 HYDRATE IV INFUSION ADD-ON: CPT | Performed by: EMERGENCY MEDICINE

## 2023-12-08 PROCEDURE — 84100 ASSAY OF PHOSPHORUS: CPT

## 2023-12-08 PROCEDURE — 71045 X-RAY EXAM CHEST 1 VIEW: CPT | Mod: 26 | Performed by: RADIOLOGY

## 2023-12-08 PROCEDURE — 85004 AUTOMATED DIFF WBC COUNT: CPT | Performed by: EMERGENCY MEDICINE

## 2023-12-08 PROCEDURE — 83735 ASSAY OF MAGNESIUM: CPT

## 2023-12-08 PROCEDURE — 74176 CT ABD & PELVIS W/O CONTRAST: CPT | Mod: 26 | Performed by: RADIOLOGY

## 2023-12-08 PROCEDURE — 74176 CT ABD & PELVIS W/O CONTRAST: CPT | Mod: MG

## 2023-12-08 PROCEDURE — 83605 ASSAY OF LACTIC ACID: CPT | Performed by: EMERGENCY MEDICINE

## 2023-12-08 PROCEDURE — 99285 EMERGENCY DEPT VISIT HI MDM: CPT | Mod: 25 | Performed by: EMERGENCY MEDICINE

## 2023-12-08 PROCEDURE — 82947 ASSAY GLUCOSE BLOOD QUANT: CPT

## 2023-12-08 PROCEDURE — 36415 COLL VENOUS BLD VENIPUNCTURE: CPT | Performed by: NURSE PRACTITIONER

## 2023-12-08 PROCEDURE — 82565 ASSAY OF CREATININE: CPT

## 2023-12-08 PROCEDURE — 999N000065 XR ABDOMEN PORT 1 VIEW

## 2023-12-08 RX ORDER — MAGNESIUM OXIDE 400 MG/1
800 TABLET ORAL 2 TIMES DAILY
Status: DISCONTINUED | OUTPATIENT
Start: 2023-12-08 | End: 2023-12-10

## 2023-12-08 RX ORDER — GABAPENTIN 100 MG/1
100 CAPSULE ORAL 2 TIMES DAILY
Status: DISCONTINUED | OUTPATIENT
Start: 2023-12-08 | End: 2023-12-10

## 2023-12-08 RX ORDER — AMOXICILLIN 250 MG
1 CAPSULE ORAL AT BEDTIME
Status: DISCONTINUED | OUTPATIENT
Start: 2023-12-08 | End: 2023-12-10

## 2023-12-08 RX ORDER — POTASSIUM CHLORIDE 7.45 MG/ML
10 INJECTION INTRAVENOUS ONCE
Status: DISCONTINUED | OUTPATIENT
Start: 2023-12-08 | End: 2023-12-08

## 2023-12-08 RX ORDER — HYDROMORPHONE HCL IN WATER/PF 6 MG/30 ML
0.2 PATIENT CONTROLLED ANALGESIA SYRINGE INTRAVENOUS
Status: DISCONTINUED | OUTPATIENT
Start: 2023-12-08 | End: 2023-12-08

## 2023-12-08 RX ORDER — METOPROLOL TARTRATE 50 MG
50 TABLET ORAL 2 TIMES DAILY
Status: DISCONTINUED | OUTPATIENT
Start: 2023-12-08 | End: 2023-12-20 | Stop reason: HOSPADM

## 2023-12-08 RX ORDER — SODIUM CHLORIDE, SODIUM LACTATE, POTASSIUM CHLORIDE, CALCIUM CHLORIDE 600; 310; 30; 20 MG/100ML; MG/100ML; MG/100ML; MG/100ML
INJECTION, SOLUTION INTRAVENOUS CONTINUOUS
Status: DISCONTINUED | OUTPATIENT
Start: 2023-12-08 | End: 2023-12-10

## 2023-12-08 RX ORDER — ONDANSETRON 2 MG/ML
4 INJECTION INTRAMUSCULAR; INTRAVENOUS ONCE
Status: COMPLETED | OUTPATIENT
Start: 2023-12-08 | End: 2023-12-08

## 2023-12-08 RX ORDER — ALLOPURINOL 300 MG/1
300 TABLET ORAL DAILY
Status: DISCONTINUED | OUTPATIENT
Start: 2023-12-08 | End: 2023-12-10

## 2023-12-08 RX ORDER — SULFAMETHOXAZOLE AND TRIMETHOPRIM 400; 80 MG/1; MG/1
1 TABLET ORAL DAILY
Status: DISCONTINUED | OUTPATIENT
Start: 2023-12-09 | End: 2023-12-10

## 2023-12-08 RX ORDER — LEVOTHYROXINE SODIUM 88 UG/1
88 TABLET ORAL DAILY
Status: DISCONTINUED | OUTPATIENT
Start: 2023-12-09 | End: 2023-12-10

## 2023-12-08 RX ORDER — PROCHLORPERAZINE 25 MG
12.5 SUPPOSITORY, RECTAL RECTAL EVERY 12 HOURS PRN
Status: DISCONTINUED | OUTPATIENT
Start: 2023-12-08 | End: 2023-12-20 | Stop reason: HOSPADM

## 2023-12-08 RX ORDER — ATORVASTATIN CALCIUM 40 MG/1
40 TABLET, FILM COATED ORAL EVERY EVENING
Status: DISCONTINUED | OUTPATIENT
Start: 2023-12-08 | End: 2023-12-10

## 2023-12-08 RX ORDER — HYDROMORPHONE HCL IN WATER/PF 6 MG/30 ML
0.1 PATIENT CONTROLLED ANALGESIA SYRINGE INTRAVENOUS EVERY 4 HOURS PRN
Status: DISCONTINUED | OUTPATIENT
Start: 2023-12-08 | End: 2023-12-09

## 2023-12-08 RX ORDER — POTASSIUM PHOS IN 0.9 % NACL 15MMOL/250
15 PLASTIC BAG, INJECTION (ML) INTRAVENOUS ONCE
Status: COMPLETED | OUTPATIENT
Start: 2023-12-08 | End: 2023-12-09

## 2023-12-08 RX ORDER — CIPROFLOXACIN 500 MG/1
500 TABLET, FILM COATED ORAL 2 TIMES DAILY
Status: DISCONTINUED | OUTPATIENT
Start: 2023-12-08 | End: 2023-12-10

## 2023-12-08 RX ORDER — LIDOCAINE 40 MG/G
CREAM TOPICAL
Status: DISCONTINUED | OUTPATIENT
Start: 2023-12-08 | End: 2023-12-10

## 2023-12-08 RX ORDER — BISACODYL 10 MG
10 SUPPOSITORY, RECTAL RECTAL ONCE
Status: COMPLETED | OUTPATIENT
Start: 2023-12-08 | End: 2023-12-09

## 2023-12-08 RX ORDER — ACETAMINOPHEN 325 MG/1
650 TABLET ORAL EVERY 4 HOURS PRN
Status: DISCONTINUED | OUTPATIENT
Start: 2023-12-08 | End: 2023-12-10

## 2023-12-08 RX ORDER — ONDANSETRON 2 MG/ML
4 INJECTION INTRAMUSCULAR; INTRAVENOUS EVERY 6 HOURS PRN
Status: DISCONTINUED | OUTPATIENT
Start: 2023-12-08 | End: 2023-12-20 | Stop reason: HOSPADM

## 2023-12-08 RX ORDER — PANTOPRAZOLE SODIUM 40 MG/1
40 TABLET, DELAYED RELEASE ORAL
Status: DISCONTINUED | OUTPATIENT
Start: 2023-12-09 | End: 2023-12-10

## 2023-12-08 RX ORDER — PROCHLORPERAZINE MALEATE 5 MG
5 TABLET ORAL EVERY 6 HOURS PRN
Status: DISCONTINUED | OUTPATIENT
Start: 2023-12-08 | End: 2023-12-20 | Stop reason: HOSPADM

## 2023-12-08 RX ORDER — MAGNESIUM SULFATE HEPTAHYDRATE 40 MG/ML
2 INJECTION, SOLUTION INTRAVENOUS ONCE
Status: COMPLETED | OUTPATIENT
Start: 2023-12-08 | End: 2023-12-09

## 2023-12-08 RX ORDER — GABAPENTIN 100 MG/1
200 CAPSULE ORAL DAILY
Status: DISCONTINUED | OUTPATIENT
Start: 2023-12-08 | End: 2023-12-10

## 2023-12-08 RX ORDER — OXYCODONE HYDROCHLORIDE 5 MG/1
5 TABLET ORAL EVERY 6 HOURS PRN
Status: DISCONTINUED | OUTPATIENT
Start: 2023-12-08 | End: 2023-12-10

## 2023-12-08 RX ORDER — ONDANSETRON 4 MG/1
4 TABLET, ORALLY DISINTEGRATING ORAL EVERY 6 HOURS PRN
Status: DISCONTINUED | OUTPATIENT
Start: 2023-12-08 | End: 2023-12-20 | Stop reason: HOSPADM

## 2023-12-08 RX ORDER — PREDNISONE 5 MG/1
5 TABLET ORAL DAILY
Status: DISCONTINUED | OUTPATIENT
Start: 2023-12-08 | End: 2023-12-10

## 2023-12-08 RX ORDER — HYDROMORPHONE HYDROCHLORIDE 1 MG/ML
0.5 INJECTION, SOLUTION INTRAMUSCULAR; INTRAVENOUS; SUBCUTANEOUS ONCE
Status: COMPLETED | OUTPATIENT
Start: 2023-12-08 | End: 2023-12-08

## 2023-12-08 RX ADMIN — GABAPENTIN 200 MG: 100 CAPSULE ORAL at 15:12

## 2023-12-08 RX ADMIN — SODIUM CHLORIDE 1000 ML: 9 INJECTION, SOLUTION INTRAVENOUS at 11:20

## 2023-12-08 RX ADMIN — PREDNISONE 5 MG: 5 TABLET ORAL at 16:43

## 2023-12-08 RX ADMIN — MYCOPHENOLIC ACID 540 MG: 360 TABLET, DELAYED RELEASE ORAL at 18:22

## 2023-12-08 RX ADMIN — METOPROLOL TARTRATE 50 MG: 50 TABLET, FILM COATED ORAL at 20:02

## 2023-12-08 RX ADMIN — HYDROMORPHONE HYDROCHLORIDE 0.2 MG: 0.2 INJECTION, SOLUTION INTRAMUSCULAR; INTRAVENOUS; SUBCUTANEOUS at 18:22

## 2023-12-08 RX ADMIN — SODIUM CHLORIDE, POTASSIUM CHLORIDE, SODIUM LACTATE AND CALCIUM CHLORIDE 500 ML: 600; 310; 30; 20 INJECTION, SOLUTION INTRAVENOUS at 16:00

## 2023-12-08 RX ADMIN — SODIUM CHLORIDE, POTASSIUM CHLORIDE, SODIUM LACTATE AND CALCIUM CHLORIDE: 600; 310; 30; 20 INJECTION, SOLUTION INTRAVENOUS at 14:22

## 2023-12-08 RX ADMIN — ONDANSETRON 4 MG: 2 INJECTION INTRAMUSCULAR; INTRAVENOUS at 11:20

## 2023-12-08 RX ADMIN — HYDROMORPHONE HYDROCHLORIDE 0.2 MG: 0.2 INJECTION, SOLUTION INTRAMUSCULAR; INTRAVENOUS; SUBCUTANEOUS at 15:12

## 2023-12-08 RX ADMIN — POTASSIUM PHOSPHATE, MONOBASIC POTASSIUM PHOSPHATE, DIBASIC 15 MMOL: 224; 236 INJECTION, SOLUTION, CONCENTRATE INTRAVENOUS at 23:27

## 2023-12-08 RX ADMIN — MAGNESIUM OXIDE TAB 400 MG (241.3 MG ELEMENTAL MG) 800 MG: 400 (241.3 MG) TAB at 20:02

## 2023-12-08 RX ADMIN — HYDROMORPHONE HYDROCHLORIDE 0.1 MG: 0.2 INJECTION, SOLUTION INTRAMUSCULAR; INTRAVENOUS; SUBCUTANEOUS at 23:01

## 2023-12-08 RX ADMIN — GABAPENTIN 100 MG: 100 CAPSULE ORAL at 20:03

## 2023-12-08 RX ADMIN — MAGNESIUM SULFATE HEPTAHYDRATE 2 G: 40 INJECTION, SOLUTION INTRAVENOUS at 22:21

## 2023-12-08 RX ADMIN — ATORVASTATIN CALCIUM 40 MG: 40 TABLET, FILM COATED ORAL at 20:02

## 2023-12-08 RX ADMIN — ALLOPURINOL 300 MG: 300 TABLET ORAL at 16:43

## 2023-12-08 RX ADMIN — ONDANSETRON 4 MG: 2 INJECTION INTRAMUSCULAR; INTRAVENOUS at 20:56

## 2023-12-08 RX ADMIN — TACROLIMUS 1.5 MG: 1 CAPSULE ORAL at 18:22

## 2023-12-08 RX ADMIN — HYDROMORPHONE HYDROCHLORIDE 0.5 MG: 1 INJECTION, SOLUTION INTRAMUSCULAR; INTRAVENOUS; SUBCUTANEOUS at 11:09

## 2023-12-08 ASSESSMENT — ACTIVITIES OF DAILY LIVING (ADL)
ADLS_ACUITY_SCORE: 35

## 2023-12-08 NOTE — OP NOTE
Transplant Surgery  Operative Note    Preop Dx: Recurrent umbilical hernia, post liver/kidney transplant  Postop Dx: same  Procedure: Redo mesh repair of Umbilical hernia, and minimal adhesional lysis of the small bowel.  Surgeon: Chad Clifton M.D.  ASSISTANT:  Dr Sandro Ratliff resident.  resident. There was no qualified general surgery resident available to assist during this procedure.   Anesthesia: General  EBL: Less than 50 mL ml  Fluids: Please see anesthesia chart  UO: Please see anesthesia chart  Drains: none  Specimen: None.  Complications: None  Findings:    #1.  10 x 6 cm hernial defect with the previous mesh and stitches inside  #2.  Contents small bowel loops.  There were some small bowel loops with adhesions    Complications: None.    Indication: The patient has recurrent umbilical hernia status post liver kidney transplant after discussing the risks and benefits of surgery and potential complications, the patient provided informed consent.     DETAILS OF PROCEDURE:  The patient was brought to the operating room, placed in a supine position.  Perioperative prophylactic IV antibiotics were given.  Anesthesia was adminisitered. The parts was prepped and draped in the usual sterile fashion.  Time out was performed.    We made a transverse incision below the umbilicus of about 8 cm.  Skin was incised with knife.  The subcutaneous tissues were incised with cautery.  In the subcutaneous tissue we noticed a hernial sac.  We opened the hernia sac.  We noticed loops of small bowel stuck to each other and the hernial sac.  We  the sac from the small bowel.  We did minimal adhesional lysis to straighten the loops of small bowel.  The small bowel loops were reduced into the abdomen.  We brought in a 12 x 8 cm phasic's mesh.  We fixed the phasic's mesh using 0 Prolene with a 2 cm margin to the hernial defect.  The fascia was then closed with continuous #1 Prolene.  The subcutaneous tissue was closed  with 3-0 Vicryl and skin was approximated with 4-0 Monocryl.    All needle, sponge, and instrument counts were accurate.  The patient tolerated the procedure well without apparent complications and was trasfered to the PACU in good condition.  Faculty was present for critical portions of the procedure.

## 2023-12-08 NOTE — TELEPHONE ENCOUNTER
"Nabila called regarding Casey. He had his umbilical hernia repaired yesterday afternoon and he is having 7-10/10 pain. Pain not incisional, but lower in his pelvis, groin, and rectum. Casey could be heard moaning in the background. Nabila stated that he was feeling pretty well last night and went to bed, woke up around 12am and had a small bowel movement. He vomited x2 starting around 4am and has had a few phlegmy emesis since then and has been nauseated. The pain started around the time of his first emesis.     Message to Dr. Clifton and he said \"Please send him to ER\".    Call back to Nabila. Casey is not able to get into the car. She will be calling for an ambulance. She is concerned that they will just take him to their local ER, she has apparently had issues with this before. Advised her to tell them that he needs to go to [place] [facility], she will try.     Nabila called back to let [facility] know that EMS was on the way. They arrived while on the phone and are taking him to [place] [facility].     "

## 2023-12-08 NOTE — ED PROVIDER NOTES
"    [place] EMERGENCY DEPARTMENT ([facility])    12/08/23       ED PROVIDER NOTE      History     Chief Complaint   Patient presents with    Post-op Problem     The history is provided by the patient and medical records.     Damion Quinones is a 68 year old male , s/p Mesh repair of Umbilical hernia (12/07/2023 yesterday).with PMH of IgA Nephropathy and ANCA vasculitis, Alcoholic cirrhosis, s/p Kidney and Liver transplant (06/06/2023) on Immunosuppression, HTN, Steroid-induced DM, Paroxymal Afib currently on Eliquis, Gout, Mineral bone disorder  who presents to the Emergency Department today due to severe lower abdominal /groin pain \"wrapping around to the left side of the abdomen.\" Patient notes that the pain suddenly started at 2 am this morning. He had some Tylenol without relief. He has had 4 episodes  of vomiting and some phlegmy emesis since 4 am this morning. He also feels nausea. He denies fever. Patient also reports extreme pain in rectum.     Of note, patient underwent umbilical hernia repair surgery and was discharged yesterday. He went home and ate well. His last bowel movement was at 12:30 pm this morning.  He was feeling okay until approximately 2 AM today  Social: Here with his wife  Past Medical History  Past Medical History:   Diagnosis Date    Alcoholic cirrhosis of liver with ascites (H) 10/11/2019    ANCA-associated vasculitis (H) 2022    Antiplatelet or antithrombotic long-term use     C. difficile colitis     Coronary artery disease     [facility] 4/2023 - complete occlusion of RCA    ESRD (end stage renal disease) on dialysis (H)     Gout     HCC (hepatocellular carcinoma) (H) 09/05/2023    History of hemochromatosis 10/11/2019    Hypertension     IgA nephropathy     Obesity     PAF (paroxysmal atrial fibrillation) (H)     Pre-diabetes 2023    Psoriatic arthritis (H)     RA (rheumatoid arthritis) (H)     Status post kidney transplant 06/06/2023    Induction with thymoglobulin 4mg/kg, + DSA " CW9    Status post liver transplantation (H) 2023     Past Surgical History:   Procedure Laterality Date    APPENDECTOMY      Removed at 18 Years Old     BENCH KIDNEY  2023    Procedure: Bench kidney;  Surgeon: Chad Clifton MD;  Location:  OR    BENCH LIVER  2023    Procedure: Bench liver;  Surgeon: Chad Clifton MD;  Location:  OR    COLONOSCOPY      2014 at [facility]     CV CORONARY ANGIOGRAM N/A 2023    Procedure: Coronary Angiogram;  Surgeon: Pablo Araujo MD;  Location:  HEART CARDIAC CATH LAB    EYE SURGERY Bilateral     Cataract    H STATISTIC PICC LINE INSERTION >5YR, FAILED Left 2023    Unable to advance catheter over the axillary area    HERNIA REPAIR      History of bilateral inguinal hernia repair: 10/28/2014. Open hernia repair: 10/2017. Abdominal wound exploration and debridement 2017    INSERT SHUNT PORTAL TRANSJUGULAR INTRAHEPTIC  2022    IR CVC NON TUNNEL LINE REMOVAL  7/3/2023    IR CVC NON TUNNEL PLACEMENT > 5 YRS  2023    IR CVC TUNNEL PLACEMENT > 5 YRS OF AGE  2023    IR PICC PLACEMENT > 5 YRS OF AGE  2023    IR RENAL BIOPSY RIGHT  2023    RETURN LIVER TRANSPLANT N/A 2023    Procedure: Return liver transplant. Intra-operative ultrasound;  Surgeon: Chad Clifton MD;  Location: [facility] OR    TRANSPLANT KIDNEY RECIPIENT  DONOR N/A 2023    Procedure: Transplant kidney recipient  donor, ureteral stent placement;  Surgeon: Chad Clifton MD;  Location: [facility] OR    TRANSPLANT LIVER RECIPIENT  DONOR N/A 2023    Procedure: Transplant liver recipient  donor;  Surgeon: Chad Clifton MD;  Location: [facility] OR     acetaminophen (TYLENOL) 500 MG tablet  Alcohol Swabs PADS  allopurinol (ZYLOPRIM) 300 MG tablet  anakinra (KINERET) 100 MG/0.67ML SOSY injection  apixaban ANTICOAGULANT (ELIQUIS ANTICOAGULANT) 5 MG tablet  atorvastatin (LIPITOR) 40  MG tablet  capsaicin (ZOSTRIX) 0.025 % external cream  ciprofloxacin (CIPRO) 500 MG tablet  gabapentin (NEURONTIN) 100 MG capsule  levothyroxine (SYNTHROID/LEVOTHROID) 88 MCG tablet  magnesium oxide (MAG-OX) 400 MG tablet  metoprolol tartrate (LOPRESSOR) 50 MG tablet  multivitamin w/minerals (THERA-VIT-M) tablet  MYFORTIC (BRAND) 180 MG EC tablet  oxyCODONE (ROXICODONE) 5 MG tablet  pantoprazole (PROTONIX) 40 MG EC tablet  predniSONE (DELTASONE) 5 MG tablet  SENNA-docusate sodium (SENNA S) 8.6-50 MG tablet  [employer] MISC  simethicone (MYLICON) 125 MG chewable tablet  sulfamethoxazole-trimethoprim (BACTRIM) 400-80 MG tablet  tacrolimus (GENERIC) 0.5 MG capsule  tacrolimus (GENERIC) 1 MG capsule  tadalafil (CIALIS) 5 MG tablet  triamcinolone (KENALOG) 0.1 % external ointment  ursodiol (ACTIGALL) 250 MG tablet      No Known Allergies  Family History  Family History   Problem Relation Age of Onset    Lung Cancer Mother     Colon Cancer Father     Lung Cancer Brother     Heart Failure Brother     Kidney Disease Brother      Social History   Social History     Tobacco Use    Smoking status: Never     Passive exposure: Never    Smokeless tobacco: Never   Vaping Use    Vaping Use: Never used   Substance Use Topics    Alcohol use: Not Currently     Comment: Last ETOH use was 12/31/2021    Drug use: Not Currently         A medically appropriate review of systems was performed with pertinent positives and negatives noted in the HPI, and all other systems negative.    Physical Exam   BP: (!) 163/89  Pulse: 99  Temp: 97.6  F (36.4  C)  Resp: 20  SpO2: 96 %  Physical Exam  Physical Exam   Constitutional:   well nourished, well developed, appears markedly uncomfortable  HENT:   Head: Normocephalic and atraumatic.   Eyes: Conjunctivae are normal. Pupils are equal, round, and reactive to light.   pharynx has no erythema or exudate, mucous membranes are dry  Neck:   no adenopathy, no bony tenderness  Cardiovascular: regular  rate and rhythm without murmurs or gallops  Pulmonary/Chest: Clear to auscultation bilaterally, with no wheezes or retractions. No respiratory distress.  GI: Patient with surgical scars to the umbilicus, markedly distended abdomen with surgical scars  Abdomen feels hot to touch, tenderness to the left lower quadrant and suprapubic area  Tenderness without swelling to left groin  Back:  No bony or CVA tenderness   Musculoskeletal:  no edema  Skin: Skin is warm and dry. No rash noted.   Neurological: alert and oriented to person, place, and time. Nonfocal exam  Psychiatric:  normal mood and affect.      ED Course, Procedures, & Data      Procedures                  Results for orders placed or performed during the hospital encounter of 12/08/23   XR Abdomen 2 Views     Status: None    Narrative    EXAM: Abdominal radiograph 12/8/2023 11:32 AM    HISTORY: 68 years Male abdl pain s/p umbilical hernia surgery  yesterday.    COMPARISON: CT abdomen/pelvis 9/27/2023. Abdominal radiograph 8/1/2023    TECHNIQUE: Single frontal supine view(s) of the abdomen.    FINDINGS:  Postsurgical changes of umbilical hernia repair which mesh placement,  kidney and liver transplant. There surgical clips overlying the pelvis  and upper abdomen. Coiling material within the left upper quadrant.  Nonobstructive bowel gas pattern. No pneumatosis or portal venous gas.  No free intra-abdominal air. No suspicious abdominal calcifications.  The bony structures appear intact. Vascular calcifications. Soft  tissues are unremarkable. Lung bases are relatively clear...      Impression    IMPRESSION: No obstructive bowel gas pattern.    I have personally reviewed the examination and initial interpretation  and I agree with the findings.    MARK ALVAREZ MD         SYSTEM ID:  [id number]   Comprehensive metabolic panel     Status: Abnormal   Result Value Ref Range    Sodium 139 135 - 145 mmol/L    Potassium 3.7 3.4 - 5.3 mmol/L    Carbon Dioxide (CO2)  22 22 - 29 mmol/L    Anion Gap 12 7 - 15 mmol/L    Urea Nitrogen 18.9 8.0 - 23.0 mg/dL    Creatinine 0.96 0.67 - 1.17 mg/dL    GFR Estimate 87 >60 mL/min/1.73m2    Calcium 8.5 (L) 8.8 - 10.2 mg/dL    Chloride 105 98 - 107 mmol/L    Glucose 186 (H) 70 - 99 mg/dL    Alkaline Phosphatase 133 40 - 150 U/L    AST 19 0 - 45 U/L    ALT 10 0 - 70 U/L    Protein Total 5.6 (L) 6.4 - 8.3 g/dL    Albumin 3.8 3.5 - 5.2 g/dL    Bilirubin Total 0.4 <=1.2 mg/dL   CRP inflammation     Status: Abnormal   Result Value Ref Range    CRP Inflammation 9.18 (H) <5.00 mg/L   Lactic acid whole blood     Status: Abnormal   Result Value Ref Range    Lactic Acid 2.6 (H) 0.7 - 2.0 mmol/L   CBC with platelets and differential     Status: Abnormal   Result Value Ref Range    WBC Count 7.0 4.0 - 11.0 10e3/uL    RBC Count 4.96 4.40 - 5.90 10e6/uL    Hemoglobin 14.5 13.3 - 17.7 g/dL    Hematocrit 46.8 40.0 - 53.0 %    MCV 94 78 - 100 fL    MCH 29.2 26.5 - 33.0 pg    MCHC 31.0 (L) 31.5 - 36.5 g/dL    RDW 16.9 (H) 10.0 - 15.0 %    Platelet Count 197 150 - 450 10e3/uL    % Neutrophils 87 %    % Lymphocytes 4 %    % Monocytes 8 %    % Eosinophils 0 %    % Basophils 0 %    % Immature Granulocytes 1 %    NRBCs per 100 WBC 0 <1 /100    Absolute Neutrophils 6.2 1.6 - 8.3 10e3/uL    Absolute Lymphocytes 0.3 (L) 0.8 - 5.3 10e3/uL    Absolute Monocytes 0.6 0.0 - 1.3 10e3/uL    Absolute Eosinophils 0.0 0.0 - 0.7 10e3/uL    Absolute Basophils 0.0 0.0 - 0.2 10e3/uL    Absolute Immature Granulocytes 0.1 <=0.4 10e3/uL    Absolute NRBCs 0.0 10e3/uL   Creatinine POCT     Status: Normal   Result Value Ref Range    Creatinine POCT 1.1 0.7 - 1.3 mg/dL    GFR, ESTIMATED POCT >60 >60 mL/min/1.73m2   CBC with platelets differential     Status: Abnormal    Narrative    The following orders were created for panel order CBC with platelets differential.  Procedure                               Abnormality         Status                     ---------                                -----------         ------                     CBC with platelets and d...[[id number]]  Abnormal            Final result                 Please view results for these tests on the individual orders.     Medications   HYDROmorphone (PF) (DILAUDID) injection 0.5 mg (0.5 mg Intravenous $Given 12/8/23 1109)   sodium chloride 0.9% BOLUS 1,000 mL (1,000 mLs Intravenous $New Bag 12/8/23 1120)   ondansetron (ZOFRAN) injection 4 mg (4 mg Intravenous $Given 12/8/23 1120)   iohexol (OMNIPAQUE) 140 MG/ML solution for oral use 25 mL (25 mLs Oral $Given 12/8/23 1225)     Labs Ordered and Resulted from Time of ED Arrival to Time of ED Departure   COMPREHENSIVE METABOLIC PANEL - Abnormal       Result Value    Sodium 139      Potassium 3.7      Carbon Dioxide (CO2) 22      Anion Gap 12      Urea Nitrogen 18.9      Creatinine 0.96      GFR Estimate 87      Calcium 8.5 (*)     Chloride 105      Glucose 186 (*)     Alkaline Phosphatase 133      AST 19      ALT 10      Protein Total 5.6 (*)     Albumin 3.8      Bilirubin Total 0.4     CRP INFLAMMATION - Abnormal    CRP Inflammation 9.18 (*)    LACTIC ACID WHOLE BLOOD - Abnormal    Lactic Acid 2.6 (*)    CBC WITH PLATELETS AND DIFFERENTIAL - Abnormal    WBC Count 7.0      RBC Count 4.96      Hemoglobin 14.5      Hematocrit 46.8      MCV 94      MCH 29.2      MCHC 31.0 (*)     RDW 16.9 (*)     Platelet Count 197      % Neutrophils 87      % Lymphocytes 4      % Monocytes 8      % Eosinophils 0      % Basophils 0      % Immature Granulocytes 1      NRBCs per 100 WBC 0      Absolute Neutrophils 6.2      Absolute Lymphocytes 0.3 (*)     Absolute Monocytes 0.6      Absolute Eosinophils 0.0      Absolute Basophils 0.0      Absolute Immature Granulocytes 0.1      Absolute NRBCs 0.0     ISTAT CREATININE POCT - Normal    Creatinine POCT 1.1      GFR, ESTIMATED POCT >60     ISTAT CREATININE POCT     XR Abdomen 2 Views   Final Result   IMPRESSION: No obstructive bowel gas pattern.      I have  personally reviewed the examination and initial interpretation   and I agree with the findings.      MARK ALVAREZ MD            SYSTEM ID:  [id number]      CT Abdomen Pelvis w/o Contrast    (Results Pending)          Critical care was not performed.     Medical Decision Making  The patient's presentation was of high complexity (an acute health issue posing potential threat to life or bodily function).    The patient's evaluation involved:  an assessment requiring an independent historian (wife)  ordering and/or review of 3+ test(s) in this encounter (see separate area of note for details)  independent interpretation of testing performed by another health professional (I independently reviewed the imaging study)  discussion of management or test interpretation with another health professional (transplant surgery)    The patient's management necessitated high risk (a parenteral controlled substance) and high risk (a decision regarding hospitalization).    Assessment & Plan        I have reviewed the nursing notes.   Emergency Department course:  The patient was seen and examined at 1047 am in Critical access hospital.  An IV was established and I treated the patient with a normal saline bolus IV, Zofran IV, and Dilaudid IV for pain.  Patient had also received Dilaudid from EMS in route .    I consulted  transplant surgery who evaluated the patient in the ED and recommended obtaining a CT of the abdomen pelvis with p.o. contrast    Laboratory studies show an elevated lactate of 2.6.  WBC is 7.0.  CRP is elevated at 9.18.  Comprehensive metabolic panel shows hyperglycemia, with a glucose of 186 and a low total protein of 5.6    The patient was seen by Dr. Clifton and the transplant team in the ED.  Damion Quinones is a 68 year old male with a history of liver and kidney transplant and umbilical hernia surgery yesterday who presents with abdominal pain and vomiting.    He will be admitted to the transplant surgery service under  the care of Dr. White.    CT of the abdomen pelvis with p.o. contrast shows :  IMPRESSION:   1.  Postoperative changes of umbilical hernia repair and mesh  placement. There is a small volume of postoperative pneumocephalus.  Mild-to-moderate volume of abdominal ascites which could be  postsurgical in nature. Attention on follow-up imaging to assess for  resolution.  2.  There is no evidence of bowel perforation or obstruction.  3.  Postsurgical changes of the liver and kidney transplantation. No  evidence of focal complication.  4.  Fatty atrophy of the pancreatic parenchyma without pancreatic mass  or inflammatory change.  5.  Lung bases are clear.         I have reviewed the findings, diagnosis, plan and need for follow up with the patient.    New Prescriptions    No medications on file       Final diagnoses:   Acute post-operative pain   Liver replaced by transplant (H)   Status post kidney transplant     This note was created in part by the use of Dragon voice recognition dictation system. Inadvertent grammatical errors and typographical errors may still exist.  MD Melany Paulson  [facility] EMERGENCY DEPARTMENT  12/8/2023     Melany Lockett MD  12/08/23 1517

## 2023-12-08 NOTE — H&P
Physician Attestation   I saw this patient with the resident and agree with the resident/fellow's findings and plan of care as documented in the note.      Key findings:   Sudden onset abdominal pain at 2 am,   Exam abdomen tender, no rigidity  CT reviewed with radiologist: no evidence of perforation or obstruction    Recommend  Admit  Hold anticoagulation and NPO for now      MANAGEMENT DISCUSSED with the following over the past 24 hours: review of scand     I have personally reviewed the following data over the past 24 hrs:    7.0  \   14.5   / 197     139 105 18.9 /  186 (H)   3.7 22 0.96; 1.1 \     ALT: 10 AST: 19 AP: 133 TBILI: 0.4   ALB: 3.8 TOT PROTEIN: 5.6 (L) LIPASE: N/A     Procal: N/A CRP: 9.18 (H) Lactic Acid: 2.4 (H)           Chad Clifton MD  Date of Service (when I saw the patient): 12/08/23  [facility]    History and Physical  Solid Organ Transplant     Date of Admission:  12/8/2023    Assessment & Plan   Damion Quinones is a 68 year old male with a history of PAF, obesity, HTN, pre-diabetes, rheumatoid and psoriatic arthritis, CAD, and cirrhosis (alcohol + hemochromatosis) and 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 He underwent a redo mesh repair of umbilical hernia and minimal DHEERAJ of small bowel 12/7/23 with Dr. Clifton. He now presents from home with severe LLQ abdominal pain and N/V.     s/p Redo mesh repair of umbilical hernia and minimal DHEERAJ of small bowel: POD#1. He ate dinner yesterday and went to bed. Had BM around midnight. Developed severe LLQ abdominal pain radiating to rectum around 02:00 and began vomiting at 04:00.    - AXR: No e/o obstruction   - Start IVF   - PRN analgesics/antiemetics   - NPO status   - STAT CT abdomen/pelvis with PO contrast    Hx K 6/6/23:  Liver: LFTs WNL.   Kidney: Cr at baseline ~0.8-1.     Immunosuppressed status secondary to medications:   Maintenance:    - Myfortic 540 mg BID    "- Tacrolimus 1.5 mg BID. Goal level 8-10.    - Prednisone 5 mg daily    Neuro:  Peripheral neuropathy: Follows with Neurology. PTA gabapentin, capsaicin cream.   Acute post-op pain: LLQ abdominal pain.    - PRN IV Dilaudid while NPO    Hematology: No issues.     Cardiorespiratory:   Hx CAD: PTA atorvastatin.   Hx PAF; HTN: Continue PTA metoprolol 50 mg BID. PTA apixaban on HOLD for now.    GI/Nutrition: NPO due to N/V as above.     Endocrine:   Hypothyroidism: PTA levothyroxine.    Fluid/Electrolytes: MIVF:  mL/hr  Hypomagnesemia: PTA Mag-Ox 800 mg BID.    : No issues.     Infectious disease: No issues    Rheum:  Hx Gout: Follows with Rheumatology. PTA allopurinol, prednisone, PRN anakinra.     Prophylaxis: DVT (mechanical while apixaban on hold), fall, GI (PPI), pneumocystis (Bactrim), allison-op (Cipro)    Disposition: Admit to         Diet: NPO for Medical/Clinical Reasons Except for: Meds  DVT Prophylaxis: Pneumatic Compression Devices, hold apixaban for now  Lux Catheter: Not present  Lines: None     Cardiac Monitoring: None    Clinically Significant Risk Factors Present on Admission   # Drug Induced Coagulation Defect: home medication list includes an anticoagulant medication    # Hypertension: Noted on problem list      # Obesity: Estimated body mass index is 36.88 kg/m  as calculated from the following:    Height as of 12/7/23: 1.702 m (5' 7\").    Weight as of 12/7/23: 106.8 kg (235 lb 7.2 oz).    Disposition Plan      Expected Discharge Date: 12/10/2023                The patient's care was discussed with the Attending Physician, Dr. Clifton .      Lizbeth Greer, NP    ______________________________________________________________________    Chief Complaint   LLQ pain, N/V    History is obtained from the patient, EMR    History of Present Illness   Damion Quinones is a 68 year old male with a history of PAF, obesity, HTN, pre-diabetes, rheumatoid and psoriatic arthritis, CAD, and cirrhosis " (alcohol + hemochromatosis) and 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 He underwent a redo mesh repair of umbilical hernia and minimal DHEERAJ of small bowel 12/7/23 with Dr. Clifton.     He ate dinner yesterday and went to bed. Had BM around midnight. Developed severe LLQ abdominal pain radiating to rectum around 02:00 and began vomiting at 04:00. He rated his pain 10/10 upon arrival to the ED and received IV fluids, Zofran, and IV Dilaudid. He is now feeling more comfortable and rates his pain 5/10. Per his wife, he is not a complainer and took minimal pain meds even after transplant.     Review of Systems    The 10 point Review of Systems is negative other than noted in the HPI.     Past Medical History    I have reviewed this patient's medical history and updated it with pertinent information if needed.   Past Medical History:   Diagnosis Date    Alcoholic cirrhosis of liver with ascites (H) 10/11/2019    ANCA-associated vasculitis (H) 2022    Antiplatelet or antithrombotic long-term use     C. difficile colitis     Coronary artery disease     [facility] 4/2023 - complete occlusion of RCA    ESRD (end stage renal disease) on dialysis (H)     Gout     HCC (hepatocellular carcinoma) (H) 09/05/2023    History of hemochromatosis 10/11/2019    Hypertension     IgA nephropathy     Obesity     PAF (paroxysmal atrial fibrillation) (H)     Pre-diabetes 2023    Psoriatic arthritis (H)     RA (rheumatoid arthritis) (H)     Status post kidney transplant 06/06/2023    Induction with thymoglobulin 4mg/kg, + DSA CW9    Status post liver transplantation (H) 06/06/2023       Past Surgical History   Past Surgical History:   Procedure Laterality Date    APPENDECTOMY      Removed at 18 Years Old     BENCH KIDNEY  06/06/2023    Procedure: Bench kidney;  Surgeon: Chad Clifton MD;  Location:  OR    BENCH LIVER  06/06/2023    Procedure: Bench liver;  Surgeon: Chad Clifton MD;  Location:  OR     COLONOSCOPY       at [facility].     CV CORONARY ANGIOGRAM N/A 2023    Procedure: Coronary Angiogram;  Surgeon: Pablo Araujo MD;  Location: [facility]    EYE SURGERY Bilateral     Cataract    H STATISTIC PICC LINE INSERTION >5YR, FAILED Left 2023    Unable to advance catheter over the axillary area    HERNIA REPAIR      History of bilateral inguinal hernia repair: 10/28/2014. Open hernia repair: 10/2017. Abdominal wound exploration and debridement 2017    INSERT SHUNT PORTAL TRANSJUGULAR INTRAHEPTIC  2022    IR CVC NON TUNNEL LINE REMOVAL  7/3/2023    IR CVC NON TUNNEL PLACEMENT > 5 YRS  2023    IR CVC TUNNEL PLACEMENT > 5 YRS OF AGE  2023    IR PICC PLACEMENT > 5 YRS OF AGE  2023    IR RENAL BIOPSY RIGHT  2023    RETURN LIVER TRANSPLANT N/A 2023    Procedure: Return liver transplant. Intra-operative ultrasound;  Surgeon: Chad Clifton MD;  Location: [facility] OR    TRANSPLANT KIDNEY RECIPIENT  DONOR N/A 2023    Procedure: Transplant kidney recipient  donor, ureteral stent placement;  Surgeon: Chad Clifton MD;  Location: [facility] OR    TRANSPLANT LIVER RECIPIENT  DONOR N/A 2023    Procedure: Transplant liver recipient  donor;  Surgeon: Chad Clifton MD;  Location: [facility] OR       Prior to Admission Medications   Prior to Admission Medications   Prescriptions Last Dose Informant Patient Reported? Taking?   Alcohol Swabs PADS  Spouse/Significant Other No No   Sig: Use to swab the area of the injection or quyen as directed Per insurance coverage   MYFORTIC (BRAND) 180 MG EC tablet  Spouse/Significant Other No No   Sig: Take 3 tablets (540 mg) by mouth 2 times daily   SENNA-docusate sodium (SENNA S) 8.6-50 MG tablet   No No   Sig: Take 1 tablet by mouth at bedtime Take while taking narcotics   Sharps Container MISC  Spouse/Significant Other No No   Sig: Use as directed to dispose  of needles, lancets and other sharps   acetaminophen (TYLENOL) 500 MG tablet   No No   Sig: Take 1 tablet (500 mg) by mouth every 6 hours as needed for mild pain   allopurinol (ZYLOPRIM) 300 MG tablet   Yes No   Sig: Take 1 tablet by mouth daily   anakinra (KINERET) 100 MG/0.67ML SOSY injection   Yes No   Sig: Inject 0.67 mLs (100 mg) Subcutaneous daily as needed   apixaban ANTICOAGULANT (ELIQUIS ANTICOAGULANT) 5 MG tablet  Spouse/Significant Other No No   Sig: Take 1 tablet (5 mg) by mouth 2 times daily   atorvastatin (LIPITOR) 40 MG tablet   No No   Sig: Take 1 tablet (40 mg) by mouth every evening   capsaicin (ZOSTRIX) 0.025 % external cream   No No   Sig: Apply to feet three times per day   ciprofloxacin (CIPRO) 500 MG tablet   No No   Sig: Take 1 tablet (500 mg) by mouth 2 times daily   gabapentin (NEURONTIN) 100 MG capsule   No No   Sig: Take 1 capsule (100 mg) by mouth 3 times daily for 90 days   Patient taking differently: Take 100 mg by mouth 3 times daily 100 mg in am, 200 mg in the afternoon and 100 mg at bedtime by mouth daily   levothyroxine (SYNTHROID/LEVOTHROID) 88 MCG tablet  Spouse/Significant Other No No   Sig: Take 1 tablet (88 mcg) by mouth daily   Patient taking differently: Take 88 mcg by mouth daily Takes in the middle of the night daily   magnesium oxide (MAG-OX) 400 MG tablet  Spouse/Significant Other No No   Sig: Take 2 tablets (800 mg) by mouth 2 times daily   metoprolol tartrate (LOPRESSOR) 50 MG tablet   No No   Sig: Take 1 tablet (50 mg) by mouth 2 times daily for 90 days   multivitamin w/minerals (THERA-VIT-M) tablet  Spouse/Significant Other No No   Sig: Take 1 tablet by mouth daily   oxyCODONE (ROXICODONE) 5 MG tablet   No No   Sig: Take 1 tablet (5 mg) by mouth every 6 hours as needed for pain   pantoprazole (PROTONIX) 40 MG EC tablet   No No   Sig: Take 1 tablet (40 mg) by mouth every morning (before breakfast)   predniSONE (DELTASONE) 5 MG tablet   Yes No   Sig: Take 5 mg by mouth  daily   simethicone (MYLICON) 125 MG chewable tablet  Spouse/Significant Other Yes No   Sig: Take 125 mg by mouth 4 times daily as needed for intestinal gas   sulfamethoxazole-trimethoprim (BACTRIM) 400-80 MG tablet  Spouse/Significant Other No No   Sig: Take 1 tablet by mouth daily   tacrolimus (GENERIC) 0.5 MG capsule   No No   Sig: Take 1 capsule (0.5 mg) by mouth every 12 hours Total dose: 1.5mg BID   tacrolimus (GENERIC) 1 MG capsule   No No   Sig: Take 1 capsule (1 mg) by mouth every 12 hours Total dose: 1.5mg BID   tadalafil (CIALIS) 5 MG tablet   No No   Sig: Take 1 tablet (5 mg) by mouth daily as needed (take 1-2 tablets at least 30 minutes prior to sexual activity.)   triamcinolone (KENALOG) 0.1 % external ointment   Yes No   Sig: Apply topically 2 times daily   ursodiol (ACTIGALL) 250 MG tablet  Spouse/Significant Other No No   Sig: Take 1 tablet (250 mg) by mouth 2 times daily      Facility-Administered Medications: None        Physical Exam   Vital Signs: Temp: 98.1  F (36.7  C) Temp src: Oral BP: (!) 148/101 Pulse: 103   Resp: 20 SpO2: 94 % O2 Device: None (Room air)    Weight: 0 lbs 0 oz    General Appearance: resting comfortably in bed  Eyes: EOMI  Respiratory: unlabored on RA  Cardiovascular: perfused  GI: abdomen soft, diffusely tender to palpation. Umbilical hernia repair incision C/D/I, open to air.   Genitourinary: no Lux present  Skin: warm, dry  Musculoskeletal: no edema noted  Neurologic: A&OX4  Psychiatric: calm, behavior appropriate to situation    Data     I have personally reviewed the following data over the past 24 hrs:    7.0  \   14.5   / 197     139 105 18.9 /  186 (H)   3.7 22 0.96; 1.1 \     ALT: 10 AST: 19 AP: 133 TBILI: 0.4   ALB: 3.8 TOT PROTEIN: 5.6 (L) LIPASE: N/A     Procal: N/A CRP: 9.18 (H) Lactic Acid: 2.6 (H)         Imaging results reviewed over the past 24 hrs:   Recent Results (from the past 24 hour(s))   XR Abdomen 2 Views    Narrative    EXAM: Abdominal radiograph  12/8/2023 11:32 AM    HISTORY: 68 years Male abdl pain s/p umbilical hernia surgery  yesterday.    COMPARISON: CT abdomen/pelvis 9/27/2023. Abdominal radiograph 8/1/2023    TECHNIQUE: Single frontal supine view(s) of the abdomen.    FINDINGS:  Postsurgical changes of umbilical hernia repair which mesh placement,  kidney and liver transplant. There surgical clips overlying the pelvis  and upper abdomen. Coiling material within the left upper quadrant.  Nonobstructive bowel gas pattern. No pneumatosis or portal venous gas.  No free intra-abdominal air. No suspicious abdominal calcifications.  The bony structures appear intact. Vascular calcifications. Soft  tissues are unremarkable. Lung bases are relatively clear...      Impression    IMPRESSION: No obstructive bowel gas pattern.    I have personally reviewed the examination and initial interpretation  and I agree with the findings.    MARK ALVAREZ MD         SYSTEM ID:  [id number]

## 2023-12-08 NOTE — ED TRIAGE NOTES
Patient BIBA from home in [place] for evaluation of post op pain. Patient had surgery on hernia yesterday and is now having severe groin pain. Patient has hx of kidney and liver tx in the past, transplant team wanted him seen here. Patient received 0.5mg of dilaudid from EMS. VSS     Triage Assessment (Adult)       Row Name 12/08/23 1044          Triage Assessment    Airway WDL WDL        Respiratory WDL    Respiratory WDL WDL        Skin Circulation/Temperature WDL    Skin Circulation/Temperature WDL WDL        Cardiac WDL    Cardiac WDL WDL        Peripheral/Neurovascular WDL    Peripheral Neurovascular WDL WDL        Cognitive/Neuro/Behavioral WDL    Cognitive/Neuro/Behavioral WDL WDL

## 2023-12-08 NOTE — DISCHARGE SUMMARY
[facility]    Discharge Summary  Transplant Surgery    Date of Admission:  12/8/2023  Date of Discharge:  12/20/2023  Discharging Provider: Lizbeth Greer NP / Damion Gibbons MD    Discharge Diagnoses   Active Problems:    HTN, kidney transplant related    Tophaceous gout    Deep vein thrombosis (DVT) of non-extremity vein, unspecified chronicity    Chronic atrial fibrillation (H)    Liver replaced by transplant (H)    Immunosuppressed status (H24)    Kidney replaced by transplant    CAD (coronary artery disease)    Anemia in stage 2 chronic kidney disease    Neuropathy    Acute post-operative pain    S/P hernia repair    N&V (nausea and vomiting)    C. difficile colitis    Intra-abdominal infection    Hypothyroidism    Hypomagnesemia    History of Present Illness   Damion Quinones is a 68 year old male with a history of PAF, obesity, HTN, pre-diabetes, rheumatoid and psoriatic arthritis, CAD, and cirrhosis (alcohol + hemochromatosis) and 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 He underwent a redo mesh repair of umbilical hernia and minimal DHEERAJ of small bowel 12/7/23 with Dr. Clifton. He presented from home with severe LLQ abdominal pain and N/V. Now s/p RTOR on 12/10 with repaired enterotomy and abdominal wash out.     Hospital Course   Damion Quinones was admitted on 12/8/2023.  The following problems were addressed during his hospitalization:    s/p 12/7/23 Redo mesh repair of umbilical hernia and minimal DHEERAJ of small bowel; RTOR 12/10/23 Ex lap, repair of SB perforation, abdominal wash out: Patient presented POD#1 with severe LLQ abdominal pain with accompanying N/V. Ultimately returned to OR 12/10 for repair of SB perforation and abdominal washout. Was kept strict NPO with NGT to LIS and started on broad spectrum antibimicrobials and TPN. Abdominal cultures +Saccharomyces cerevisiae. Repeat CT scan 12/15 negative for leak. Diet advanced to  regular and TPN discontinued. Plan:    - Continue fluconazole x 4 weeks (EOT 1/6/24).    - Follow up with Dr. Clifton in clinic on 12/26/23.      Hx SLK 6/6/23:  Liver: LFTs WNL.   Kidney; PATI secondary to sepsis/SIRS: Cr now back to baseline ~0.8-1. Good UOP.     - Discharging on Lasix 40 mg daily. If weight decreases more than four lbs, decrease Lasix to 20 mg daily and notify transplant coordinator.      Immunosuppressed status secondary to medications:   Maintenance:    - Myfortic 540 mg BID   - Tacrolimus 0.5 mg BID. Goal level 8-10.    - Prednisone 5 mg daily  Infection prophylaxis: PJP (Bactrim indefinitely)    Transplant coordinator: Liver (Cindy Clay) / Kidney (Angelita Brian) [phone]    Neuro:  Peripheral neuropathy: Follows with Neurology. PTA gabapentin, capsaicin cream.   Acute post-op pain: LLQ abdominal pain improving. Continue PRN Tylenol, oxycodone (already prescribed prior to admission).     Hematology:   Acute blood loss/Iron deficiency anemia: Hgb 11-12 post-operatively (previously WNL). Monitor.   - Consider 2-3 doses of iron ferrlecit 200 mg IV daily for anemia with mild iron deficiency when out of infection risk.   Chronic anticoagulation for Afib and DVT/PE ppx; B/l internal jugular thrombus noted in OR: PTA apixaban held starting 12/3 prior to procedure. 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 On heparin gtt while inpatient. Apixaban restarted 12/20.    - Continue apixaban.      Cardiorespiratory:   Hx CAD: PTA atorvastatin, metoprolol.  Hx PAF; HTN: Cardiology consulted earlier this admission due to uncontrolled afib. Received digoxin x2 then amiodorone gtt. IV metoprolol initiated and amiodarone off. Now transitioned back to PO medications.   - Continue PTA metoprolol 50 mg BID and apixaban.     Endocrine:   Hypothyroidism: PTA levothyroxine.     Fluid/Electrolytes:   Hypomagnesemia: PTA Mag-Ox 800 mg  BID.    Rheum:  Hx Gout: Follows with Rheumatology. Received Anakinra 100 mg x2 while inpatient per Rheumatology.    - PTA allopurinol, prednisone, PRN anakinra.     Infectious disease:   Intradbominal infection/Sepsis: Improved since RTOR. Lactate improved. Cultures +yeast (Saccharomyces cerevisiae). Afebrile, no leukocytosis. Initially on Zosyn (now stopped)/Fluconazole.    - Continue fluconazole x 4 weeks (EOT 1/6/24)  Cdiff: PCR/Ag +, toxin Neg. Due to symptoms, PO Vanco and IV flagyl were started per Dr. Clifton. Flagyl now stopped. Plan:    - Oral vancomycin 125 mg qid x 14 days (12/14-12/27), then 125 mg bid x 14 days (12/28-1/10) and reassess.         Resolved Problems:   Hypotension; Tachycardia: LA normalized. Likely 2/2 abdominal sepsis. Resolved.  Respiratory insufficiency: Secondary to pulmonary edema. Extubated 12/11 without difficulty. Now breathing comfortably on RA. Resolved.   Steroid/stress induced hyperglycemia: Managed with sliding scale insulin. Resolved.   At risk for malnutrition: Nutrition consulted with prolonged NPO status and patient was started on TPN. CT with PO contrast on 12/15 without e/o leak. NGT pulled. Diet advanced to regular. Robbie counts demonstrating good PO intake. TPN discontinued. Resolved.     Discharge Disposition   Discharged to home   Condition at discharge: Stable    Pending Results   These results will be followed up by Transplant team  Unresulted Labs Ordered in the Past 30 Days of this Admission       Date and Time Order Name Status Description    12/10/2023 10:02 AM Fungal or Yeast Culture Routine Preliminary     12/10/2023 10:02 AM Fungal or Yeast Culture Routine Preliminary     12/10/2023 10:02 AM Fungal or Yeast Culture Routine Preliminary           Final pathology results: No pathology submitted  Primary Care Physician   Gary Serrano    Physical Exam   Temp: 98.4  F (36.9  C) Temp src: Oral BP: 120/73 Pulse: 89   Resp: 18 SpO2: 97 % O2 Device: None  (Room air)    Vitals:    12/17/23 0843 12/18/23 1008 12/19/23 0833   Weight: 104.6 kg (230 lb 11.2 oz) 108.4 kg (239 lb) 104.5 kg (230 lb 6.4 oz)     Vital Signs with Ranges  Temp:  [97.5  F (36.4  C)-98.6  F (37  C)] 98.4  F (36.9  C)  Pulse:  [73-94] 89  Resp:  [16-18] 18  BP: (101-139)/(73-88) 120/73  SpO2:  [93 %-97 %] 97 %  I/O last 3 completed shifts:  In: 40 [I.V.:40]  Out: 3230 [Urine:3050; Drains:180]    Constitutional: resting comfortably in chair  Eyes: EOMI  Respiratory: unlabored on RA  Cardiovascular: perfused  GI: abdomen soft. Incision closed with sutures; packed in lower aspect with Nu gauze - drainage serosanguinous.   Genitourinary: no Lux in place  Skin: warm, dry  Musculoskeletal: generalized edema  Neurologic: A&Ox4  Neuropsychiatric: calm, cooperative    Consultations This Hospital Stay   CARDIOLOGY GENERAL ADULT IP CONSULT  PHARMACY TO DOSE VANCO  NURSING TO CONSULT FOR VASCULAR ACCESS CARE IP CONSULT  PHARMACY IP CONSULT  NURSING TO CONSULT FOR VASCULAR ACCESS CARE IP CONSULT  NURSING TO CONSULT FOR VASCULAR ACCESS CARE IP CONSULT  PHARMACY/NUTRITION TO START AND MANAGE TPN  VASCULAR ACCESS FOR PICC PLACEMENT ADULT IP CONSULT  PHARMACY/NUTRITION TO START AND MANAGE TPN  INTERVENTIONAL RADIOLOGY ADULT/PEDS IP CONSULT  PHYSICAL THERAPY ADULT IP CONSULT  OCCUPATIONAL THERAPY ADULT IP CONSULT  INTERVENTIONAL RADIOLOGY ADULT/PEDS IP CONSULT  PHARMACY IP CONSULT  NURSING TO CONSULT FOR VASCULAR ACCESS CARE IP CONSULT  RHEUMATOLOGY IP CONSULT  CARE MANAGEMENT / SOCIAL WORK IP CONSULT  NURSING TO CONSULT FOR VASCULAR ACCESS CARE IP CONSULT  PHARMACY IP CONSULT  NURSING TO CONSULT FOR VASCULAR ACCESS CARE IP CONSULT  CARE MANAGEMENT / SOCIAL WORK IP CONSULT  NURSING TO CONSULT FOR VASCULAR ACCESS CARE IP CONSULT    Time Spent on this Encounter   I have spent greater than 30 minutes on this discharge.    Discharge Orders   Discharge Medications   Current Discharge Medication List        START taking  these medications    Details   fluconazole (DIFLUCAN) 200 MG tablet Take 2 tablets (400 mg) by mouth daily for 16 days  Qty: 32 tablet, Refills: 0    Associated Diagnoses: Intra-abdominal infection      vancomycin (VANCOCIN) 125 MG capsule Take 1 capsule (125 mg) by mouth 4 times daily for 8 days, THEN 1 capsule (125 mg) 2 times daily for 14 days.  Qty: 60 capsule, Refills: 0    Associated Diagnoses: C. difficile colitis           CONTINUE these medications which have CHANGED    Details   !! tacrolimus (GENERIC) 0.5 MG capsule Take 1 capsule (0.5 mg) by mouth every 12 hours Total dose: 0.5 mg BID    Associated Diagnoses: Kidney replaced by transplant; Liver transplant recipient (H)      !! tacrolimus (GENERIC) 1 MG capsule Take 1 capsule (1 mg) by mouth every 12 hours To have available for dose adjustments per transplant team. Discharge dose = 0.5 mg BID.    Associated Diagnoses: Kidney replaced by transplant; Liver transplant recipient (H)       !! - Potential duplicate medications found. Please discuss with provider.        CONTINUE these medications which have NOT CHANGED    Details   acetaminophen (TYLENOL) 500 MG tablet Take 1 tablet (500 mg) by mouth every 6 hours as needed for mild pain  Qty: 100 tablet, Refills: 0    Associated Diagnoses: H/O hernia repair      Alcohol Swabs PADS Use to swab the area of the injection or quyen as directed Per insurance coverage  Qty: 100 each, Refills: 0    Associated Diagnoses: Liver transplant recipient (H)      allopurinol (ZYLOPRIM) 300 MG tablet Take 1 tablet by mouth daily      anakinra (KINERET) 100 MG/0.67ML SOSY injection Inject 0.67 mLs (100 mg) Subcutaneous daily as needed      apixaban ANTICOAGULANT (ELIQUIS ANTICOAGULANT) 5 MG tablet Take 1 tablet (5 mg) by mouth 2 times daily  Qty: 180 tablet, Refills: 3    Associated Diagnoses: Permanent atrial fibrillation with rapid ventricular response (H)      atorvastatin (LIPITOR) 40 MG tablet Take 1 tablet (40 mg) by  mouth every evening  Qty: 90 tablet, Refills: 3    Associated Diagnoses: Coronary artery disease of native artery of native heart with stable angina pectoris (H24)      capsaicin (ZOSTRIX) 0.025 % external cream Apply to feet three times per day  Qty: 50 g, Refills: 3    Associated Diagnoses: Kidney replaced by transplant      gabapentin (NEURONTIN) 100 MG capsule Take 1 capsule (100 mg) by mouth 3 times daily for 90 days  Qty: 270 capsule, Refills: 3    Associated Diagnoses: Neuropathy      levothyroxine (SYNTHROID/LEVOTHROID) 88 MCG tablet Take 1 tablet (88 mcg) by mouth daily  Qty: 30 tablet, Refills: 3    Associated Diagnoses: Hypothyroidism, unspecified type      magnesium oxide (MAG-OX) 400 MG tablet Take 2 tablets (800 mg) by mouth 2 times daily  Qty: 60 tablet, Refills: 0    Associated Diagnoses: Liver transplant recipient (H)      metoprolol tartrate (LOPRESSOR) 50 MG tablet Take 1 tablet (50 mg) by mouth 2 times daily for 90 days  Qty: 180 tablet, Refills: 3    Associated Diagnoses: Permanent atrial fibrillation with rapid ventricular response (H)      multivitamin w/minerals (THERA-VIT-M) tablet Take 1 tablet by mouth daily  Qty: 30 tablet, Refills: 0    Associated Diagnoses: Liver transplant recipient (H)      MYFORTIC (BRAND) 180 MG EC tablet Take 3 tablets (540 mg) by mouth 2 times daily  Qty: 540 tablet, Refills: 3    Comments: TXP DT 6/6/2023 (Liver), 6/6/2023 (Kidney) TXP Dischg DT 6/25/2023 DX Kidney replaced by transplant Z94.0 [facility] ([place], [place])  Associated Diagnoses: Liver transplant recipient (H)      pantoprazole (PROTONIX) 40 MG EC tablet Take 1 tablet (40 mg) by mouth every morning (before breakfast)  Qty: 90 tablet, Refills: 3    Associated Diagnoses: Liver transplant recipient (H)      predniSONE (DELTASONE) 5 MG tablet Take 5 mg by mouth daily  Qty: 135 tablet, Refills: 3    Associated Diagnoses: Kidney replaced by transplant       Sharps Container MISC Use as directed to dispose of needles, lancets and other sharps  Qty: 1 each, Refills: 0    Associated Diagnoses: Liver transplant recipient (H)      simethicone (MYLICON) 125 MG chewable tablet Take 125 mg by mouth 4 times daily as needed for intestinal gas      sulfamethoxazole-trimethoprim (BACTRIM) 400-80 MG tablet Take 1 tablet by mouth daily  Qty: 90 tablet, Refills: 3    Associated Diagnoses: Liver transplant recipient (H)      tadalafil (CIALIS) 5 MG tablet Take 1 tablet (5 mg) by mouth daily as needed (take 1-2 tablets at least 30 minutes prior to sexual activity.)  Qty: 20 tablet, Refills: 5    Associated Diagnoses: Erectile dysfunction due to diseases classified elsewhere      triamcinolone (KENALOG) 0.1 % external ointment Apply topically 2 times daily      ursodiol (ACTIGALL) 250 MG tablet Take 1 tablet (250 mg) by mouth 2 times daily  Qty: 180 tablet, Refills: 3    Associated Diagnoses: Liver transplant recipient (H)           STOP taking these medications       ciprofloxacin (CIPRO) 500 MG tablet Comments:   Reason for Stopping:         oxyCODONE (ROXICODONE) 5 MG tablet Comments:   Reason for Stopping:         SENNA-docusate sodium (SENNA S) 8.6-50 MG tablet Comments:   Reason for Stopping:                  Home Care Referral      Reason for your hospital stay    You were admitted for severe abdominal pain after hernia repair and returned to OR 12/10 for washout, bowel repair, and mesh removal. You were treated with antibiotics. You are discharging home in stable condition with close follow up.     Activity    Your activity upon discharge: Walk at least four times a day, lift no greater than 10 pounds for 6-8 weeks from the time of surgery.  No driving while taking opioids or 3 weeks after surgery.     Adult [facility] Follow-up and recommended labs and tests    [facility] FOLLOW UP:     1. Follow up with Dr. Clifton on 12/26/23 in clinic.     Resume all other  appointments as scheduled.     Call your Transplant Coordinator ([phone]) with questions about Transplant Center appointment scheduling.     LABS:     CBC, BMP, magnesium, phosphorus, hepatic panel, tacrolimus level every Monday and Thursday by home health care nurse if arranged, or at an outpatient lab.     Monitor and record    Monitor blood pressure and weight daily.     When to contact your care team    WHEN TO CONTACT YOUR  COORDINATOR:     Transplant Coordinator [phone]     Notify your coordinator if you have pain over your liver, increased redness or drainage from your incision, fever greater than 100F, yellowing of skin or eyes, or decreased urine output or new or increased amount of blood in urine.     Notify your coordinator immediately if you are ever unable to take your immunosuppressive medications for any reason.     If you have URGENT concerns after office hours, please call the hospital switchboard at [phone] and ask to have the organ transplant nurse on-call paged. If you have a life-threatening emergency, go to the nearest emergency room.     Wound care and dressings    Instructions to care for your wound at home: Your sutures will be removed in 3 weeks after operation. Wash incision daily with soap and water. Do not soak or scrub.    Pack lower aspect of incision with Nu gauze and cover. Change dressing twice daily until follow up appointment, then have Dr. Clifton reassess.     Diet    Diet recommendations post-transplant: Heart healthy dietary habits long term (low saturated/trans fat, low sodium). High protein diet x 8 weeks. Practice food safety precautions.         Data   Most Recent 3 CBC's:  Recent Labs   Lab Test 12/20/23  0615 12/19/23  0541 12/18/23  0543   WBC 11.4* 11.9* 9.6   HGB 11.1* 11.2* 11.4*   MCV 95 95 96    198 174      Most Recent 3 BMP's:  Recent Labs   Lab Test 12/20/23  0615 12/19/23  1156 12/19/23  0942 12/19/23  0744 12/19/23  0541  12/18/23  0742 12/18/23  0543   *  --   --   --  133*  --  137   POTASSIUM 5.2  --   --   --  4.7  --  4.8   CHLORIDE 102  --   --   --  102  --  106   CO2 21*  --   --   --  22  --  21*   BUN 30.4*  --   --   --  32.2*  --  31.0*   CR 0.98  --   --   --  0.91  --  1.00   ANIONGAP 9  --   --   --  9  --  10   NAZARIO 8.6*  --   --   --  8.4*  --  8.5*   * 168* 163*   < > 150*   < > 131*    < > = values in this interval not displayed.     Most Recent 2 LFT's:  Recent Labs   Lab Test 12/20/23  0615 12/19/23  0541   AST 19 19   ALT 20 19   ALKPHOS 99 95   BILITOTAL 0.3 0.3     Most Recent INR's and Anticoagulation Dosing History:  Anticoagulation Dose History  More data exists         Latest Ref Rng & Units 8/7/2023 8/10/2023 8/14/2023 8/17/2023 11/22/2023 12/10/2023 12/11/2023   Recent Dosing and Labs   INR 0.85 - 1.15 1.56  1.44  1.25  1.22  1.40  2.29  1.92      Most Recent 3 Troponin's:No lab results found.  Most Recent Cholesterol Panel:  Recent Labs   Lab Test 12/18/23  0543 12/11/23  0327 11/22/22  0848   CHOL  --   --  285*   LDL  --   --  187*   HDL  --   --  79   TRIG 135   < > 96    < > = values in this interval not displayed.     Most Recent 6 Bacteria Isolates From Any Culture (See EPIC Reports for Culture Details):No lab results found.  Most Recent TSH, T4 and A1c Labs:  Recent Labs   Lab Test 09/20/23  1611 09/05/23  0702   TSH 0.73 7.03*   T4  --  0.76*   A1C 5.2  --      Results for orders placed or performed during the hospital encounter of 12/08/23   XR Abdomen 2 Views    Narrative    EXAM: Abdominal radiograph 12/8/2023 11:32 AM    HISTORY: 68 years Male abdl pain s/p umbilical hernia surgery  yesterday.    COMPARISON: CT abdomen/pelvis 9/27/2023. Abdominal radiograph 8/1/2023    TECHNIQUE: Single frontal supine view(s) of the abdomen.    FINDINGS:  Postsurgical changes of umbilical hernia repair which mesh placement,  kidney and liver transplant. There surgical clips overlying the  pelvis  and upper abdomen. Coiling material within the left upper quadrant.  Nonobstructive bowel gas pattern. No pneumatosis or portal venous gas.  No free intra-abdominal air. No suspicious abdominal calcifications.  The bony structures appear intact. Vascular calcifications. Soft  tissues are unremarkable. Lung bases are relatively clear...      Impression    IMPRESSION: No obstructive bowel gas pattern.    I have personally reviewed the examination and initial interpretation  and I agree with the findings.    MARK ALVAREZ MD         SYSTEM ID:  [id number]   CT Abdomen Pelvis w/o Contrast    Narrative    EXAM: Abdomen/pelvis CT without contrast 12/8/2023 1:33 PM    HISTORY: 68 years Male With PO contrast, no IV. s/p Umbilical hernia  repair yesterday now with severe LLQ pain, emesis x4, and rectal  pain..    COMPARISON: None.    TECHNIQUE: Axial CT images from the lung bases through the symphysis  pubis were obtained without intravenous contrast. Oral contrast was  administered. Coronal and sagittal reformats provided.    FINDINGS:  Limited evaluation secondary to lack of intravenous contrast.     image(s) are noncontributory.    LINES/TUBES: None.    HEPATIC: Postoperative changes of liver transplantation. No suspicious  hepatic mass. No intrahepatic biliary ductal dilation..    BILIARY: The gallbladder is surgically absent.. No extrahepatic  biliary ductal dilation..    SPLEEN: Normal size. No focal lesions.    PANCREAS: Fatty atrophy of the pancreatic parenchyma. No pancreatic  mass, peripancreatic fluid collection or acute inflammatory changes..    ADRENALS: Unremarkable.    RENAL: A trophic appearance of the bilateral native kidneys with  perinephric fat stranding. Normal parenchymal appearance of the right  lower quadrant transplant kidney. No hydronephrosis, mass, or  obstructive nephrolithiasis.    URINARY BLADDER: Moderately dilated. No focal wall defect or mass.    REPRODUCTIVE: Hydrocele of  the left testicle..    GASTROINTESTINAL: Surgical clips near the esophageal hiatus. The  distal esophagus is unremarkable. The stomach is moderately dilated  with intraluminal contrast. Contrast opacifies duodenum and portions  of the jejunum. There are dilated or decompressed segments of small or  large bowel to suggest obstruction. There is moderate stool within the  colon. No evidence of appendicitis no extraluminal extension of  contrast to suggest bowel perforation or fistulous tract.    MESENTERY/PERITONEUM: There is a small-to-moderate ascites within the  abdomen, collecting somewhat symmetrically about the lower abdominal  quadrants. There is a small volume ascites along the inferior hepatic  border. Small volume of pneumoperitoneum is noted;  likely  postsurgical given recent umbilical hernia repair. There is diffuse  nonspecific abdominal fat stranding, which is expected given HISTORY  of multiple intra-abdominal procedures. No organized fluid collection  in abdomen or pelvis. Embolization coils within the left upper  quadrant adjacent to the inferior border of the stomach are stable.    LYMPH NODES: No definite intra-abdominal or pelvic lymphadenopathy..     VASCULAR: Moderate atherosclerotic vascular calcifications.    LUNG BASES: No focal pulmonary consolidation. Bibasilar atelectasis.  No suspicious pulmonary nodule or mass. Trace bilateral pleural  effusions. The cardiac silhouette size is enlarged. Multivessel  coronary artery calcifications..    MUSCULOSKELETAL: Degenerative changes in keeping with the patient's  age. No acute osseous abnormality. Left femoral head and avascular  necrosis which is stable from previous exam. No evidence of collapse.  Slight stable sclerosis of the right femoral head may also represent  minimally avascular necrosis change.    SOFT TISSUES: Postsurgical changes of umbilical hernia repair and mesh  placement. Small volume of subcutaneous emphysema about the  umbilicus  with adjacent fat stranding of the soft tissues. There is fat  stranding along the right lateral abdominal wall, likely representing  prior surgical tract.      Impression    IMPRESSION:   1.  Postoperative changes of umbilical hernia repair and mesh  placement. There is a small volume of expected postoperative  pneumoperitoneum. Mild-to-moderate volume of abdominal ascites which  could be postsurgical in nature.  2.  There is no evidence of bowel perforation or obstruction.  3.  Postsurgical changes of the liver and kidney transplantation. No  evidence of focal complication.  4.  Lung bases are clear.  5.  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    I have personally reviewed the examination and initial interpretation  and I agree with the findings.    MARK ALVAREZ MD         SYSTEM ID:  [id number]   XR Abdomen Port 1 View    Narrative    EXAMINATION:  XR ABDOMEN PORT 1 VIEW 12/9/2023     COMPARISON: 12/8/2023 CT.    HISTORY: Admit with N/V s/p hernia repair. Received PO contrast for CT  scan at 13:30. Please follow contrast to see if it makes it to colon..    TECHNIQUE: Frontal supine view of the abdomen.    FINDINGS: No appreciable oral contrast opacifying the bowel. No  abnormally dilated loops of bowel, free air, or pneumatosis in the  visualized abdomen.. No portal venous gas.  Embolization coils and  surgical clips project over the upper abdomen.      Impression    IMPRESSION:   1. No appreciable oral contrast opacifying the bowel, likely due to  low concentration required for CT imaging and dilution since  administration.  2. Nonobstructive bowel gas pattern.    I have personally reviewed the examination and initial interpretation  and I agree with the findings.    RIKI OSPINA DO         SYSTEM ID:  [id number]   XR Chest Port 1 View    Narrative    Exam: XR CHEST PORT 1 VIEW  12/8/2023 7:52 PM     History:  dyspnea       Comparison:  11/21/2023    Findings: Single view of the  chest. Stable enlarged cardiac  silhouette. No significant pleural effusion or pneumothorax. Diffuse  interstitial prominence with hazy bibasilar opacities. No acute  osseous or upper abdominal abnormality.      Impression    Impression:   Cardiomegaly with interstitial and bibasilar opacities favored to  represent pulmonary edema and/or atelectasis.    I have personally reviewed the examination and initial interpretation  and I agree with the findings.    DELONTE CANTU MD         SYSTEM ID:  [id number]   XR Abdomen Port 1 View    Narrative    EXAM: XR ABDOMEN PORT 1 VIEW  LOCATION: [facility]  DATE: 12/8/2023    INDICATION: s p NGT placement  COMPARISON: CT 12/8/2023      Impression    IMPRESSION: Enteric decompression tube and side hole in the proximal stomach. Nonobstructive bowel gas pattern.   XR Chest Port 1 View    Narrative    EXAM: XR CHEST PORT 1 VIEW  12/9/2023 8:30 AM      HISTORY: worsening hypoxia    COMPARISON: 12/20/2023    FINDINGS: Single view of the chest. Enteric tube courses inferiorly  below the diaphragm with tip out of the field of view. Trachea is  midline. Stable enlarged cardiac silhouette. Low lung volumes. Streaky  perihilar and bibasilar pulmonary opacities. No definite pleural  effusion, though the left costophrenic angle is collimated out of the  field-of-view. No pneumothorax.      Impression    IMPRESSION:   Cardiomegaly with streaky perihilar and bibasilar pulmonary opacities,  likely representing pulmonary edema and atelectasis in the setting of  low lung volumes.    I have personally reviewed the examination and initial interpretation  and I agree with the findings.    RIKI OSPINA DO         SYSTEM ID:  [id number]   CT Abdomen Pelvis w/o Contrast    Narrative    EXAMINATION: CT ABDOMEN PELVIS W/O CONTRAST, 12/9/2023 10:02 AM    TECHNIQUE:  Helical CT images from the lung bases through the pubic  symphysis were obtained without IV  contrast.     COMPARISON: 12/8/2023    HISTORY: look for any free air    FINDINGS:    Abdomen and pelvis: Stable surgical changes of hepatic  transplantation. No appreciable focal hepatic lesion on these  noncontrast images. Surgically absent gallbladder. No intra or  extrahepatic biliary dilation. Mildly atrophic pancreas. Nonenlarged  spleen. Stable macroscopic fat containing right adrenal myelolipoma  measuring 9 mm. Stable microscopic fat-containing left adrenal adenoma  measuring 1.3 cm. Atrophic native kidneys with unchanged fluid  attenuation cysts arising from the lower pole of the left kidney.  Right lower quadrant renal transplant cortical lesion. No  hydronephrosis, hydroureter, or urinary tract stone. Excreted contrast  within the calyces of the right lower quadrant renal transplant and  within the bladder lumen. Dystrophic calcifications in the mildly  enlarged prostate. Symmetric seminal vesicles. Increased small to  moderate volume ascites with loculated components along the anterior  aspect of the stomach, within the greater omentum, and within the  central mesentery. Intermediate to high attenuation material  collecting within the ascites in the pelvis anterior to the rectum.  Scattered tiny foci of free air throughout the anterior  intraperitoneal space. Gastric tube tip and sidehole in the stomach.  No dilated small or large bowel. Mild to moderate colonic stool  burden. Scattered air-fluid levels throughout the small bowel. Minimal  dilute oral contrast opacifies a few loops of small bowel in the  central abdomen. No appreciable extraluminal leakage of contrast.  Moderate aortoiliac atherosclerotic calcification without aneurysmal  dilation. No abdominal or pelvic lymphadenopathy.    Lung bases:  Small left and trace right pleural effusions with  adjacent atelectasis greatest in the left lower lobe. Cardiomegaly  with moderate coronary artery calcifications.    Bones and soft tissues: Umbilical  hernia repair with mesh with  continued loculated fluid and foci of air within the periumbilical  subcutaneous fat. Additional foci of postoperative subcutaneous  emphysema within the supraumbilical abdominal wall subcutaneous fat.  Upper abdominal and right lower quadrant abdominal wall incisional  scar is related to hepatic and renal transplantations. Abdominal wall  and upper thigh subcutaneous edema. Bilateral gynecomastia. Multilevel  degenerative change in the spine. No acute or aggressive osseous  abnormality. Avascular necrosis of the left femoral head without  evidence of subchondral collapse.      Impression    IMPRESSION:   1. Increased small to moderate volume loculated ascites with continued  small volume postoperative pneumoperitoneum status post umbilical  hernia repair with mesh. No evidence of hollow viscus perforation.  Small amount of intermediate to high attenuation material collecting  within the ascites in the pelvis may represent postoperative blood  products, however, no evidence of acute hemorrhage.  2. Stable surgical changes of hepatic and right lower quadrant renal  transplantation.  3. Small left and trace right pleural effusions with adjacent  atelectasis.  4. Other chronic and incidental findings as detailed in the body of  the report.    RIKI OSPINA DO         SYSTEM ID:  [id number]   US Lower Extremity Venous Duplex Bilateral    Narrative    EXAMINATION: DOPPLER VENOUS ULTRASOUND OF BILATERAL LOWER EXTREMITIES,  12/9/2023 3:36 PM     COMPARISON: 9/20/2023.    HISTORY: Evaluate for DVT due to tachycardia. Concern for PE and  unable to give IV contrast.    TECHNIQUE:  Gray-scale evaluation with compression, spectral flow and  color Doppler assessment of the deep venous system of both legs from  groin to knee, and then at the ankles.    FINDINGS:  In both lower extremities, the common femoral, femoral, popliteal and  posterior tibial veins demonstrate normal compressibility and  blood  flow. The peroneal veins are not visualized.      Impression    IMPRESSION: Peroneal veins are not visualized, otherwise no DVT  demonstrated in either lower extremity    I have personally reviewed the examination and initial interpretation  and I agree with the findings.    KURTIS JENSEN MD         SYSTEM ID:  [id number]   XR Chest Port 1 View    Narrative    EXAM: Chest radiograph 12/9/2023 9:34 PM    HISTORY: increasing O2 demands.     COMPARISON: Same day chest radiograph.     TECHNIQUE: Portable AP view of the chest.    FINDINGS: Enteric tube courses below the field-of-view. The  cardiomediastinal contours are stable. Persistent retrocardiac  silhouetting and blunting of the left costophrenic angle. Minimal  blunting of the right costophrenic angle. Bandlike atelectasis in the  lingula and left midlung zone. Continued streaky right perihilar and  right basilar opacities. There is no pneumothorax. Embolization coils  project over the upper abdomen.      Impression    IMPRESSION: Continued streaky perihilar and left greater than right  basilar opacities, slightly increased in the left lung base, likely  atelectasis associated with a small left greater than right pleural  effusions.    I have personally reviewed the examination and initial interpretation  and I agree with the findings.    RIKI OSPINA DO         SYSTEM ID:  [id number]   US Liver Transplant    Narrative    EXAMINATION: US LIVER TRANSPLANT, 12/10/2023 8:30 AM     COMPARISON: Ultrasound 9/27/2023 abdominal CT 12/9/2023    HISTORY: s/p liver transplant in 6/2023, shock    TECHNIQUE:  Gray-scale, color Doppler and spectral flow analysis.    FINDINGS:   There is small volume complex/loculated ascites in the lower quadrants  and left upper quadrant.    Liver:   The liver demonstrates normal homogeneous echotexture. No  evidence of a focal hepatic mass.     Bile Ducts: Both the intra- and extrahepatic biliary system are of  normal caliber.  The  common bile duct measures 4 mm in diameter.    Gallbladder: The gallbladder is surgically absent.    Kidneys:   The native kidneys are not well visualized.     Pancreas: Not well visualized.    Spleen:  The spleen is mildly enlarged, measuring 14.6 cm.    Visualized portions of the aorta are unremarkable.    LIVER DOPPLER:  Splenic vein:  Patent continuous normal antegrade direction flow  towards the liver, 33 cm/sec.  Extrahepatic portal vein: Not well visualized due to overlying bowel  gas.  Portal vein at anastomosis: Patent continuous antegrade flow, 50  cm/sec.  Intrahepatic portal vein:  Patent continuous antegrade flow, 37  cm/sec.  Right portal vein flow is antegrade, measuring 54 cm/sec.  Left portal vein flow is antegrade, measuring 17 cm/sec.    Inferior vena cava: patent with flow toward the heart throughout..  IVC above anastomosis: Not well-visualized.  IVC at anastomosis:  105 cm/sec.  Intrahepatic IVC:  54 cm/sec.  Extrahepatic IVC: not well visualized.    Right, mid, left hepatic veins: Patent with flow towards the inferior  vena cava.    Extrahepatic hepatic artery: Low resistance waveform with flow towards  the liver. 98 cm/sec with resistive index 0.64.  Right hepatic artery: Not well visualized.  Left hepatic artery: 79 cm/sec with resistive index 0.59.      Impression    Impression:   1. Examination is partially limited due to overlying bowel gas.  2. The visualized hepatic vasculature is patent with antegrade flow.  3. Normal grayscale appearance of the transplant liver.  4. Mild splenomegaly.  5. Small volume ascites.    I have personally reviewed the examination and initial interpretation  and I agree with the findings.    RIKI OSPINA DO         SYSTEM ID:  [id number]   US Renal Transplant with Doppler    Narrative    EXAMINATION: US RENAL TRANSPLANT,  12/10/2023 8:30 AM     COMPARISON: Ultrasound 9/20/2023, CT 12/9/2023    HISTORY: s/p kidney transplant, shock    TECHNIQUE:  Grey-scale,  color Doppler and spectral flow analysis.    FINDINGS:  The transplant kidney is located in the right lower quadrant, and  measures 11.8 cm. Parenchyma is of normal thickness and echogenicity.  No focal lesions. No hydronephrosis. No perinephric fluid collection.    Renal artery flow:   72 cm/sec peak systolic at hilum.  Not well visualized at the anastomosis due to overlying bowel gas.   Arcuate artery resistive indices (upper to lower): 0.73, 0.63, 0.58    Renal Vein Flow:  12 cm/s at hilum.   Not well visualized at the anastomosis due to overlying bowel gas.     Iliac artery flow:  Not well visualized above the anastomosis due to overlying bowel gas.   71 cm/s peak systolic below anastomosis.    Iliac vein flow:  Not well visualized above the anastomosis due to overlying bowel gas.   Patent below the anastomosis.    Other: Partially visualized small volume complex/loculated ascites in  the right lower quadrant.      Impression    IMPRESSION:   1. Normal grayscale evaluation of the right lower quadrant transplant  kidney.  2. Partially limited Doppler evaluation due to overlying bowel gas.  The visualized renal transplant vasculature is patent.  3. Complex ascites.    I have personally reviewed the examination and initial interpretation  and I agree with the findings.    RIKI OSPINA DO         SYSTEM ID:  [id number]   XR Chest Port 1 View    Narrative    EXAM: XR CHEST PORT 1 VIEW 12/10/2023 1:48 PM    DEMOGRAPHICS: Male of 68 years    INDICATION: pneumo eval    COMPARISON: 12/9/2023, 12/8/2023.    TECHNIQUE: Single portable AP view of the chest.    FINDINGS:   New endotracheal tube tip projects at the mid thoracic trachea.  Gastric tube side port projects at the stomach. Low lung volumes.  Improved aeration of the left lung base. Stable appearance of the  mediastinum and cardiac silhouette. Silhouetting of the left  hemidiaphragm and dense retrocardiac opacity. Small right pleural  effusion. No pneumothorax.  Upper abdomen is unremarkable with stable  position of surgical material.      Impression    IMPRESSION:   1.  The endotracheal tube tip projects at the mid thoracic trachea.  2.  Low lung volumes with elements of atelectasis. No pneumothorax.  3.  Continued left lower lobe atelectasis and unchanged small right  pleural effusion.    I have personally reviewed the examination and initial interpretation  and I agree with the findings.    HEBERT MAYERS MD         SYSTEM ID:  [id number]   XR Abdomen Port 1 View    Narrative    EXAMINATION:  XR ABDOMEN PORT 1 VIEW 12/10/2023     COMPARISON: CT abdomen/pelvis 12/9/2023    HISTORY: Verify NG    TECHNIQUE: Frontal supine view of the abdomen.    FINDINGS: Gastric tube tip and side-port project over the region of  the stomach. New partially visualized intraperitoneal drain over the  right lower quadrant. No abnormally dilated loops of bowel, free air,  or pneumatosis in the visualized abdomen. No portal venous gas.       Impression    IMPRESSION:   1. Gastric tube tip and side-port project over the stomach.  2. New right lower quadrant intraperitoneal drain is partially  visualized.  3. Nonobstructive bowel gas pattern.    I have personally reviewed the examination and initial interpretation  and I agree with the findings.    HEBERT MAYERS MD         SYSTEM ID:  [id number]   US Upper Extremity Venous Duplex Right    Narrative    EXAMINATION: DOPPLER VENOUS ULTRASOUND OF THE RIGHT UPPER EXTREMITY,  12/11/2023 4:05 PM     COMPARISON: Ultrasound 3/21/2023.    HISTORY: Known prior right IJ chronic venous thrombosis    TECHNIQUE:  Gray-scale evaluation with compression, spectral flow and  color Doppler assessment of the deep venous system of the right upper  extremity.    FINDINGS:  Partial compressibility of the right internal jugular vein with  nonocclusive echogenic thrombus in the lumen. Normal blood flow and  waveforms are demonstrated in the innominate, subclavian, and  axillary  veins. There is normal compressibility of the brachial, basilic and  cephalic veins. Mild subcutaneous edema in the antecubital fossa.      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    I have personally reviewed the examination and initial interpretation  and I agree with the findings.    AYOCHIQUIS TAVERAS DO BHAVESH         SYSTEM ID:  [id number]   IR CVC Tunnel Placement > 5 Yrs of Age    Narrative    Procedure 12/12/2023: Image guided tunneled central venous catheter  placement.    History: Patient is 68 year old?male?with hx of cirrhosis (EtOH and  hemochromatosis) s/p liver transplant 6/6/23, PAF (on apixaban PTA),  anemia, pre-diabetes, CAD who underwent a redo mesh repair of  umbilical hernia complicated small bowel perforation and abdominal  wash out. Due to recent surgical intervention, plan is NPO with PN for  at least 7 days, then upper GI. Attempted PICC line placement with  vascular access team was unsuccessful. 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 Patient is present for right EJ tunneled catheter  placement .     Comparison: None.    Staff: Dr. Truong Chavarria M.D.     Fellow: Ashkan Behzadi M.D.     Monitoring/Medications: Patient received moderate sedation and was  monitored by the nursing staff under the supervision of the higher  attending. Vital signs remained stable throughout the procedure.     50 mcg Fentanyl  1% lidocaine used for local analgesia.    Continuous face-to-face sedation time: 25 minutes    Fluoro time: 0.9 minutes.    Findings/procedure:    The patient understood the limitations, alternatives, and risks of the  procedure and requested the procedure be performed. Both written and  oral consent were obtained.    The right neck and upper chest were prepped and draped in the usual  sterile fashion. 1% lidocaine without epinephrine was used for  local  anesthesia.    Preprocedure right neck ultrasound redemonstrated occluded right  internal jugular artery.    Under ultrasound guidance, external  jugular venotomy was made with a  micropuncture needle. Ultrasound image documenting jugular patency and  needle venotomy was saved in the patient's record.    Micropuncture needle exchanged over guidewire for the micropuncture  sheath under fluoroscopic guidance. Catheter length measured with the  0.018 guidewire. Micropuncture sheath saline locked. 6 Danish Bard  double lumen non-cuffed double catheter was subcutaneously tunneled  from the right anterior chest to the right external  jugular venotomy  site. Catheter cut on the 27 cm marker. Micropuncture sheath exchanged  over guidewire for the peel-away sheath. Guidewire removed. Under  fluoroscopic guidance, the catheter was placed through the peel-away  sheath and positioned with its tip in the superior cavoatrial  junction. Lumen flushed and aspirated adequately. Each lumen heparin  locked with 100:1 heparin solution. 2-0 nylon catheter retaining  suture and sterile dressing applied. Right external jugular venotomy  site closed with surgical adhesive. No immediate complication.    Fluoroscopic image documenting catheter placement and final position  was saved in the patient's record.      Impression    IMPRESSION:     1. Ultrasound guided right jugular venotomy.    2. 6 Danish noncuffed tunneled central venous catheter placed with the  tip in the superior cavoatrial junction. Both lumens heparin locked  and ready for use.    Attestation  Signer name: SHREYAS DEAN MD  I attest that I supervised the procedure and was immediately  available. I reviewed the stored images and agree with the report as  written.    I have personally reviewed the examination and initial interpretation  and I agree with the findings.    SHREYAS DEAN MD         SYSTEM ID:  [id number]   XR Abdomen Port 1 View    Narrative     EXAMINATION:  XR ABDOMEN PORT 1 VIEW 12/12/2023     COMPARISON: Abdominal x-ray 12/10/2023    HISTORY: worsening distention.    TECHNIQUE: One frontal supine view of the abdomen.    FINDINGS: Enteric tube is not visualized. Multiple drains projecting  over the lower abdomen and pelvis. No abnormally dilated air-filled  loops of bowel.       Impression    IMPRESSION:     1. No abnormally dilated air-filled loops of bowel. Paucity of bowel  gas.   2. Previously seen enteric tube is not visualized. It not removed  consider radiograph of chest and upper abdomen to evaluate for  possibility of retracted enteric tube    I have personally reviewed the examination and initial interpretation  and I agree with the findings.    KURTIS JENSEN MD         SYSTEM ID:  [id number]   XR Chest Port 1 View    Narrative    EXAM: XR CHEST PORT 1 VIEW  12/12/2023 3:36 PM     HISTORY:  picc       COMPARISON:  Chest radiograph 12/10/2023    FINDINGS:     Portable upright view of the chest. Presumed interval removal of the  endotracheal tube. New right IJ central venous catheter tip projects  over the cavoatrial junction. Enteric tube tip and sidehole projected  over the stomach. Stable cardiac mediastinal silhouette. Retrocardiac  and left basilar opacities. Small left pleural effusion. No  pneumothorax.    No acute osseous abnormality. Surgical clips and embolization coils in  upper abdomen Visualized upper abdomen is unremarkable.        Impression    IMPRESSION:   1. New right IJ central venous catheter tip at the cavoatrial  junction. Presumed interval extubation.  2. Small left pleural effusions with retrocardiac and left basilar  opacity likely atelectasis and/or edema.    I have personally reviewed the examination and initial interpretation  and I agree with the findings.    GIBRAN FRASER MD         SYSTEM ID:  [id number]   XR Abdomen 1 View    Narrative    EXAMINATION:  XR ABDOMEN 1 VIEW 12/15/2023     COMPARISON: Normal x-ray  12/12/2023    HISTORY: Hx of hernia repair c/b bowel leak s/p RTOR 12/10 for  washout, bowel repair..    TECHNIQUE: Two frontal supine views of the abdomen.    FINDINGS: Bilateral lower quadrant drains. Gastric tube tip and  sidehole projects over the stomach. Postsurgical changes of liver  transplant with surgical clips visualized. Metallic coils visualized  in the left upper quadrant. No abnormally dilated air-filled loops of  bowel. No pneumatosis or portal venous gas.      Impression    IMPRESSION:   Gastric tube tip and sidehole projected over the stomach with  non-obstructive bowel gas pattern.    I have personally reviewed the examination and initial interpretation  and I agree with the findings.    ADRIANA BARROS MD         SYSTEM ID:  [id number]   CT Abdomen Pelvis w Contrast    Narrative    EXAMINATION: CT ABDOMEN PELVIS W CONTRAST, 12/15/2023 4:41 PM    INDICATION: a/p repair of sm perforation, r/o leak    COMPARISON STUDY: 12/9/2023, 1/15/2023    TECHNIQUE: CT scan of the abdomen and pelvis was performed on  multidetector CT scanner using volumetric acquisition technique and  images were reconstructed in multiple planes with variable thickness  and reviewed on dedicated workstations.     CONTRAST: 135 mL Isovue 370 injected IV without oral contrast    CT scan radiation dose is optimized to minimum requisite dose using  automated dose modulation techniques.    FINDINGS:    Support devices: Enteric tube is in the stomach. Surgical drain is  present with tip in the right lower quadrant.    Lower thorax: Moderate layering left pleural effusion and adjacent  atelectasis.    Liver: Hepatic transplant. No mass. No intrahepatic biliary ductal  dilation.    Biliary System: Cholecystectomy. No extrahepatic biliary ductal  dilation.    Pancreas: No mass or pancreatic ductal dilation. Diffuse pancreatic  atrophy.    Adrenal glands: Stable macroscopic fat containing right adrenal  myelolipoma measuring 0.9 cm. Stable  left adrenal adenoma measuring  1.3 cm.    Spleen: Enlarged, measures 13.7 cm.    Kidneys: No suspicious mass, obstructing calculus or hydronephrosis.   There is atrophic native kidneys. Right lower quadrant renal  transplant.    Gastrointestinal tract :Normal appendix. Normal caliber small bowel.    Mesentery/peritoneum/retroperitoneum: Small amount of free fluid is  present adjacent to the stomach and spleen. There is fluid in the left  paracolic gutter with mild adjacent peritoneal thickening. There is a  small amount of fluid and small foci of air present within the  umbilicus (series 4 image 326.)    Lymph nodes: No significant lymphadenopathy.    Vasculature: Patent major abdominal vasculature.  Embolization changes  in the left upper quadrant.    Pelvis: Urinary bladder is normal.    Osseous structures: No aggressive or acute osseous lesion.  Extensive  degenerative changes of the lumbar spine with multilevel bridging  osteophytes. Within the left femoral head there is sclerosis. No  subchondral collapse.      Soft tissues: Within normal limits.  Bilateral symmetric gynecomastia.   Diffuse subcutaneous anasarca.  Postsurgical laparotomy changes in  the anterior abdominal wall midline.        Impression    IMPRESSION:   1. Small amount of residual free fluid is present within the abdomen  without any free air to suggest viscus perforation.  2. Stable postsurgical changes of liver and right lower quadrant renal  transplants.  3. Postsurgical changes of umbilical hernia repair with small amount  of air and fluid in the umbilicus, presumably postsurgical.  4. Increased mild anasarca and moderate left pleural effusion.  5. Splenomegaly.  6. Unchanged avascular necrosis of the left femoral head without  subchondral collapse.    I have personally reviewed the examination and initial interpretation  and I agree with the findings.    MARK ALVAREZ MD         SYSTEM ID:  [id number]   Echo Complete     Value    LVEF   55-60%    [facility]    [id number]  [id number]  [id number]  [id number]^CONTRERAS^ELIZA^THAI     [facility],[place]  Echocardiography Laboratory  [address] [place] [address]     Name: EMILIANA SCHREIBER  MRN: [id number]  : 1955  Study Date: 2023 11:06 AM  Age: 68 yrs  Gender: Male  [place] [facility] [place]: Heart [place]  Ordering Physician: ELIZA CONTRERAS  Performed By: Virginia Sharif RDCS     BSA: 2.2 m2  Height: 67 in  Weight: 235 lb  ______________________________________________________________________________  Procedure  Complete Portable Echo Adult. Contrast Optison. Technically difficult  study.Extremely difficult acoustic windows despite the use of contrast for  endcardial border definition.  ______________________________________________________________________________  Interpretation Summary  Technically difficult study.Extremely difficult acoustic windows despite the  use of contrast for endcardial border definition.  Global and regional left ventricular function is normal with an EF of 55-60%.  Right ventricular function cannot be assessed due to poor image quality.  No pericardial effusion is present.  ______________________________________________________________________________  Left Ventricle  Global and regional left ventricular function is normal with an EF of 55-60%.     Right Ventricle  Right ventricular function cannot be assessed due to poor image quality.     Mitral Valve  Mild mitral annular calcification is present. Trace mitral insufficiency is  present.     Aortic Valve  Aortic valve sclerosis is present. On Doppler interrogation, there is no  significant stenosis or regurgitation.     Vessels  The inferior vena cava cannot be assessed.     Pericardium  No pericardial effusion is present.     Compared to Previous Study  The left ventricular function has remained the same.      ______________________________________________________________________________  Doppler Measurements & Calculations  TR max xander: 189.6 cm/sec  TR max P.4 mmHg     ______________________________________________________________________________  Report approved by: MD Shaggy Castle 2023 11:51 AM           Attestation:    The patient has been seen with team and evaluated by me. <30 min in discharge  Vital signs, labs, medications and orders were reviewed.   When obtained, diagnostic images were reviewed by me and interpreted as above.    The care plan was discussed with the multidisciplinary team and I agree with the findings and plan in this note, with any differences recorded in blue.    Immunosuppressive medication management was reviewed and adjusted as reflected in the note and orders.       Damion Gibbons MD  Professor of Surgery

## 2023-12-08 NOTE — LETTER
Transition Communication Hand-off for Care Transitions to Next Level of Care Provider    Name: Damion Quinones  : 1955  MRN #: [id number]  Primary Care Provider: Gary Serrano     Primary Clinic: [address] [place] [id number]     Reason for Hospitalization:  Liver replaced by transplant (H) [Z94.4]  Acute post-operative pain [G89.18]  Status post kidney transplant [Z94.0]  Admit Date/Time: 2023 10:42 AM  Discharge Date: 23  Payor Source: Payor: BCBS / Plan: BCBS OUT OF STATE / Product Type: Indemnity /            Reason for Communication Hand-off Referral: Other Care transition - Inpatient to outpatient    Discharge Plan: Home care  Discharge Plan:      Flowsheet Row Most Recent Value   Concerns Comments current PT/OT reccomends TCU               Discharge Needs Assessment:  Needs      Flowsheet Row Most Recent Value   Equipment Currently Used at Home grab bar, toilet, grab bar, tub/shower, shower chair, walker, rolling, wheelchair, manual   # of Referrals Placed by  Community Resources  [facility]            Follow-up plan:    Future Appointments   Date Time Provider Department Center   2023  2:45 PM [facility] PT WAITLIST [facility]   2023 10:30 AM Gallo Sharma, [facility]   2023 11:00 AM [facility]   2023 11:30 AM Jeovany Scott MD [facility]   2023 12:00 PM [address]   2023 12:20 PM [address]   2023  1:30 PM Lolis Bermudez MD [facility]   2024  2:15 PM Rafael Delgado MD [facility]   2024  7:00 AM Tito Tran MD [facility]   3/25/2024  1:30 PM [facility]   3/25/2024  2:05 PM Subhash Dutta MD [facility]   2024  9:30 AM Rafael Delgado MD [facility]   2024  1:30 PM  [facility]   2024  2:05 PM Darrel Galo MD [facility]       Any outstanding tests or procedures:        Referrals        Future Labs/Procedures    Home Care Referral     Comments:    Your provider has ordered home health services. If you have not been contacted within 2 days of your discharge please call the selected Home Care agency listed on your Discharge document.  If a Home Care agency is NOT listed, please call [phone].    OK for start of care date per earliest home care can provide.              Key Recommendations:  See NADEEM Ponce RN    AVS/Discharge Summary is the source of truth; this is a helpful guide for improved communication of patient story

## 2023-12-09 ENCOUNTER — APPOINTMENT (OUTPATIENT)
Dept: ULTRASOUND IMAGING | Facility: CLINIC | Age: 68
End: 2023-12-09
Attending: PHYSICIAN ASSISTANT
Payer: COMMERCIAL

## 2023-12-09 ENCOUNTER — APPOINTMENT (OUTPATIENT)
Dept: GENERAL RADIOLOGY | Facility: CLINIC | Age: 68
End: 2023-12-09
Payer: COMMERCIAL

## 2023-12-09 ENCOUNTER — APPOINTMENT (OUTPATIENT)
Dept: CT IMAGING | Facility: CLINIC | Age: 68
End: 2023-12-09
Attending: TRANSPLANT SURGERY
Payer: COMMERCIAL

## 2023-12-09 ENCOUNTER — APPOINTMENT (OUTPATIENT)
Dept: CARDIOLOGY | Facility: CLINIC | Age: 68
End: 2023-12-09
Attending: PHYSICIAN ASSISTANT
Payer: COMMERCIAL

## 2023-12-09 ENCOUNTER — APPOINTMENT (OUTPATIENT)
Dept: GENERAL RADIOLOGY | Facility: CLINIC | Age: 68
End: 2023-12-09
Attending: PHYSICIAN ASSISTANT
Payer: COMMERCIAL

## 2023-12-09 ENCOUNTER — APPOINTMENT (OUTPATIENT)
Dept: GENERAL RADIOLOGY | Facility: CLINIC | Age: 68
End: 2023-12-09
Attending: NURSE PRACTITIONER
Payer: COMMERCIAL

## 2023-12-09 LAB
ABO/RH(D): NORMAL
ALBUMIN SERPL BCG-MCNC: 2.6 G/DL (ref 3.5–5.2)
ALBUMIN SERPL BCG-MCNC: 2.8 G/DL (ref 3.5–5.2)
ALBUMIN UR-MCNC: 10 MG/DL
ALLEN'S TEST: YES
ALP SERPL-CCNC: 91 U/L (ref 40–150)
ALP SERPL-CCNC: 92 U/L (ref 40–150)
ALT SERPL W P-5'-P-CCNC: 10 U/L (ref 0–70)
ALT SERPL W P-5'-P-CCNC: 9 U/L (ref 0–70)
ANION GAP SERPL CALCULATED.3IONS-SCNC: 12 MMOL/L (ref 7–15)
ANION GAP SERPL CALCULATED.3IONS-SCNC: 12 MMOL/L (ref 7–15)
ANION GAP SERPL CALCULATED.3IONS-SCNC: 13 MMOL/L (ref 7–15)
ANTIBODY SCREEN: NEGATIVE
APPEARANCE UR: CLEAR
AST SERPL W P-5'-P-CCNC: 24 U/L (ref 0–45)
AST SERPL W P-5'-P-CCNC: 31 U/L (ref 0–45)
ATRIAL RATE - MUSE: 122 BPM
BASE EXCESS BLDA CALC-SCNC: -2.9 MMOL/L (ref -9–1.8)
BASE EXCESS BLDV CALC-SCNC: -2.8 MMOL/L (ref -7.7–1.9)
BASE EXCESS BLDV CALC-SCNC: -3.6 MMOL/L (ref -7.7–1.9)
BILIRUB DIRECT SERPL-MCNC: 0.47 MG/DL (ref 0–0.3)
BILIRUB SERPL-MCNC: 0.8 MG/DL
BILIRUB SERPL-MCNC: 0.9 MG/DL
BILIRUB UR QL STRIP: NEGATIVE
BUN SERPL-MCNC: 20.3 MG/DL (ref 8–23)
BUN SERPL-MCNC: 25.7 MG/DL (ref 8–23)
BUN SERPL-MCNC: 27.9 MG/DL (ref 8–23)
CALCIUM SERPL-MCNC: 8.4 MG/DL (ref 8.8–10.2)
CALCIUM SERPL-MCNC: 8.5 MG/DL (ref 8.8–10.2)
CALCIUM SERPL-MCNC: 8.6 MG/DL (ref 8.8–10.2)
CHLORIDE SERPL-SCNC: 104 MMOL/L (ref 98–107)
CHLORIDE SERPL-SCNC: 104 MMOL/L (ref 98–107)
CHLORIDE SERPL-SCNC: 105 MMOL/L (ref 98–107)
COLOR UR AUTO: YELLOW
CREAT SERPL-MCNC: 1.08 MG/DL (ref 0.67–1.17)
CREAT SERPL-MCNC: 1.62 MG/DL (ref 0.67–1.17)
CREAT SERPL-MCNC: 1.76 MG/DL (ref 0.67–1.17)
DEPRECATED HCO3 PLAS-SCNC: 21 MMOL/L (ref 22–29)
DIASTOLIC BLOOD PRESSURE - MUSE: NORMAL MMHG
EGFRCR SERPLBLD CKD-EPI 2021: 42 ML/MIN/1.73M2
EGFRCR SERPLBLD CKD-EPI 2021: 47 ML/MIN/1.73M2
EGFRCR SERPLBLD CKD-EPI 2021: 76 ML/MIN/1.73M2
ERYTHROCYTE [DISTWIDTH] IN BLOOD BY AUTOMATED COUNT: 17.6 % (ref 10–15)
ERYTHROCYTE [DISTWIDTH] IN BLOOD BY AUTOMATED COUNT: 18.2 % (ref 10–15)
GLUCOSE BLDC GLUCOMTR-MCNC: 96 MG/DL (ref 70–99)
GLUCOSE SERPL-MCNC: 109 MG/DL (ref 70–99)
GLUCOSE SERPL-MCNC: 122 MG/DL (ref 70–99)
GLUCOSE SERPL-MCNC: 147 MG/DL (ref 70–99)
GLUCOSE UR STRIP-MCNC: NEGATIVE MG/DL
HCO3 BLD-SCNC: 22 MMOL/L (ref 21–28)
HCO3 BLDV-SCNC: 24 MMOL/L (ref 21–28)
HCO3 BLDV-SCNC: 25 MMOL/L (ref 21–28)
HCT VFR BLD AUTO: 50.9 % (ref 40–53)
HCT VFR BLD AUTO: 56.7 % (ref 40–53)
HGB BLD-MCNC: 15.4 G/DL (ref 13.3–17.7)
HGB BLD-MCNC: 17.7 G/DL (ref 13.3–17.7)
HGB UR QL STRIP: NEGATIVE
INTERPRETATION ECG - MUSE: NORMAL
KETONES UR STRIP-MCNC: NEGATIVE MG/DL
LACTATE SERPL-SCNC: 2.3 MMOL/L (ref 0.7–2)
LACTATE SERPL-SCNC: 2.8 MMOL/L (ref 0.7–2)
LACTATE SERPL-SCNC: 3.3 MMOL/L (ref 0.7–2)
LACTATE SERPL-SCNC: 3.6 MMOL/L (ref 0.7–2)
LACTATE SERPL-SCNC: 3.8 MMOL/L (ref 0.7–2)
LACTATE SERPL-SCNC: 4 MMOL/L (ref 0.7–2)
LACTATE SERPL-SCNC: 4.3 MMOL/L (ref 0.7–2)
LACTATE SERPL-SCNC: 5.6 MMOL/L (ref 0.7–2)
LEUKOCYTE ESTERASE UR QL STRIP: NEGATIVE
LVEF ECHO: NORMAL
MAGNESIUM SERPL-MCNC: 1.8 MG/DL (ref 1.7–2.3)
MAGNESIUM SERPL-MCNC: 1.9 MG/DL (ref 1.7–2.3)
MAGNESIUM SERPL-MCNC: 2 MG/DL (ref 1.7–2.3)
MCH RBC QN AUTO: 28.9 PG (ref 26.5–33)
MCH RBC QN AUTO: 29.5 PG (ref 26.5–33)
MCHC RBC AUTO-ENTMCNC: 30.3 G/DL (ref 31.5–36.5)
MCHC RBC AUTO-ENTMCNC: 31.2 G/DL (ref 31.5–36.5)
MCV RBC AUTO: 94 FL (ref 78–100)
MCV RBC AUTO: 96 FL (ref 78–100)
MRSA DNA SPEC QL NAA+PROBE: NEGATIVE
MUCOUS THREADS #/AREA URNS LPF: PRESENT /LPF
NITRATE UR QL: NEGATIVE
NT-PROBNP SERPL-MCNC: 732 PG/ML (ref 0–900)
O2/TOTAL GAS SETTING VFR VENT: 5 %
O2/TOTAL GAS SETTING VFR VENT: 5 %
O2/TOTAL GAS SETTING VFR VENT: 6 %
OXYHGB MFR BLDV: 33 % (ref 70–75)
P AXIS - MUSE: NORMAL DEGREES
PCO2 BLD: 37 MM HG (ref 35–45)
PCO2 BLDV: 52 MM HG (ref 40–50)
PCO2 BLDV: 55 MM HG (ref 40–50)
PH BLD: 7.38 [PH] (ref 7.35–7.45)
PH BLDV: 7.26 [PH] (ref 7.32–7.43)
PH BLDV: 7.29 [PH] (ref 7.32–7.43)
PH UR STRIP: 5 [PH] (ref 5–7)
PHOSPHATE SERPL-MCNC: 3 MG/DL (ref 2.5–4.5)
PHOSPHATE SERPL-MCNC: 3.1 MG/DL (ref 2.5–4.5)
PHOSPHATE SERPL-MCNC: 3.5 MG/DL (ref 2.5–4.5)
PLATELET # BLD AUTO: 181 10E3/UL (ref 150–450)
PLATELET # BLD AUTO: 257 10E3/UL (ref 150–450)
PO2 BLD: 74 MM HG (ref 80–105)
PO2 BLDV: 24 MM HG (ref 25–47)
PO2 BLDV: 32 MM HG (ref 25–47)
POTASSIUM SERPL-SCNC: 4.8 MMOL/L (ref 3.4–5.3)
POTASSIUM SERPL-SCNC: 4.9 MMOL/L (ref 3.4–5.3)
POTASSIUM SERPL-SCNC: 5.1 MMOL/L (ref 3.4–5.3)
PR INTERVAL - MUSE: NORMAL MS
PROCALCITONIN SERPL IA-MCNC: 32.9 NG/ML
PROT SERPL-MCNC: 4.7 G/DL (ref 6.4–8.3)
PROT SERPL-MCNC: 4.9 G/DL (ref 6.4–8.3)
QRS DURATION - MUSE: 82 MS
QT - MUSE: 288 MS
QTC - MUSE: 438 MS
R AXIS - MUSE: 37 DEGREES
RBC # BLD AUTO: 5.33 10E6/UL (ref 4.4–5.9)
RBC # BLD AUTO: 6.01 10E6/UL (ref 4.4–5.9)
RBC URINE: 2 /HPF
SA TARGET DNA: NEGATIVE
SODIUM SERPL-SCNC: 137 MMOL/L (ref 135–145)
SODIUM SERPL-SCNC: 137 MMOL/L (ref 135–145)
SODIUM SERPL-SCNC: 139 MMOL/L (ref 135–145)
SP GR UR STRIP: 1.02 (ref 1–1.03)
SPECIMEN EXPIRATION DATE: NORMAL
SYSTOLIC BLOOD PRESSURE - MUSE: NORMAL MMHG
T AXIS - MUSE: -38 DEGREES
TROPONIN T SERPL HS-MCNC: 111 NG/L
TROPONIN T SERPL HS-MCNC: 169 NG/L
TROPONIN T SERPL HS-MCNC: 99 NG/L
UFH PPP CHRO-ACNC: <0.1 IU/ML
UFH PPP CHRO-ACNC: <0.1 IU/ML
UROBILINOGEN UR STRIP-MCNC: NORMAL MG/DL
VENTRICULAR RATE- MUSE: 139 BPM
WBC # BLD AUTO: 5 10E3/UL (ref 4–11)
WBC # BLD AUTO: 5.2 10E3/UL (ref 4–11)
WBC URINE: 6 /HPF

## 2023-12-09 PROCEDURE — C8929 TTE W OR WO FOL WCON,DOPPLER: HCPCS

## 2023-12-09 PROCEDURE — 82803 BLOOD GASES ANY COMBINATION: CPT | Performed by: PHYSICIAN ASSISTANT

## 2023-12-09 PROCEDURE — 71045 X-RAY EXAM CHEST 1 VIEW: CPT | Mod: 26 | Performed by: RADIOLOGY

## 2023-12-09 PROCEDURE — 86900 BLOOD TYPING SEROLOGIC ABO: CPT | Performed by: PHYSICIAN ASSISTANT

## 2023-12-09 PROCEDURE — 84100 ASSAY OF PHOSPHORUS: CPT | Performed by: NURSE PRACTITIONER

## 2023-12-09 PROCEDURE — 250N000009 HC RX 250: Performed by: PHYSICIAN ASSISTANT

## 2023-12-09 PROCEDURE — 85027 COMPLETE CBC AUTOMATED: CPT | Performed by: PHYSICIAN ASSISTANT

## 2023-12-09 PROCEDURE — 999N000248 HC STATISTIC IV INSERT WITH US BY RN

## 2023-12-09 PROCEDURE — 80048 BASIC METABOLIC PNL TOTAL CA: CPT

## 2023-12-09 PROCEDURE — 83735 ASSAY OF MAGNESIUM: CPT

## 2023-12-09 PROCEDURE — 36415 COLL VENOUS BLD VENIPUNCTURE: CPT | Performed by: NURSE PRACTITIONER

## 2023-12-09 PROCEDURE — 84484 ASSAY OF TROPONIN QUANT: CPT

## 2023-12-09 PROCEDURE — 85007 BL SMEAR W/DIFF WBC COUNT: CPT | Performed by: PHYSICIAN ASSISTANT

## 2023-12-09 PROCEDURE — 83605 ASSAY OF LACTIC ACID: CPT | Performed by: SURGERY

## 2023-12-09 PROCEDURE — 74018 RADEX ABDOMEN 1 VIEW: CPT | Mod: 26 | Performed by: RADIOLOGY

## 2023-12-09 PROCEDURE — 258N000003 HC RX IP 258 OP 636

## 2023-12-09 PROCEDURE — 250N000011 HC RX IP 250 OP 636: Mod: JZ | Performed by: TRANSPLANT SURGERY

## 2023-12-09 PROCEDURE — 82805 BLOOD GASES W/O2 SATURATION: CPT | Performed by: PHYSICIAN ASSISTANT

## 2023-12-09 PROCEDURE — 87040 BLOOD CULTURE FOR BACTERIA: CPT | Performed by: PHYSICIAN ASSISTANT

## 2023-12-09 PROCEDURE — 93970 EXTREMITY STUDY: CPT | Mod: 26 | Performed by: STUDENT IN AN ORGANIZED HEALTH CARE EDUCATION/TRAINING PROGRAM

## 2023-12-09 PROCEDURE — 258N000003 HC RX IP 258 OP 636: Performed by: PHYSICIAN ASSISTANT

## 2023-12-09 PROCEDURE — 36415 COLL VENOUS BLD VENIPUNCTURE: CPT | Performed by: PHYSICIAN ASSISTANT

## 2023-12-09 PROCEDURE — 80048 BASIC METABOLIC PNL TOTAL CA: CPT | Performed by: NURSE PRACTITIONER

## 2023-12-09 PROCEDURE — 36415 COLL VENOUS BLD VENIPUNCTURE: CPT

## 2023-12-09 PROCEDURE — 250N000011 HC RX IP 250 OP 636: Performed by: STUDENT IN AN ORGANIZED HEALTH CARE EDUCATION/TRAINING PROGRAM

## 2023-12-09 PROCEDURE — 93306 TTE W/DOPPLER COMPLETE: CPT | Mod: 26 | Performed by: STUDENT IN AN ORGANIZED HEALTH CARE EDUCATION/TRAINING PROGRAM

## 2023-12-09 PROCEDURE — 83605 ASSAY OF LACTIC ACID: CPT

## 2023-12-09 PROCEDURE — 250N000012 HC RX MED GY IP 250 OP 636 PS 637: Performed by: NURSE PRACTITIONER

## 2023-12-09 PROCEDURE — 99291 CRITICAL CARE FIRST HOUR: CPT | Mod: GC | Performed by: INTERNAL MEDICINE

## 2023-12-09 PROCEDURE — 99207 PR NO CHARGE LOS: CPT | Performed by: TRANSPLANT SURGERY

## 2023-12-09 PROCEDURE — 250N000011 HC RX IP 250 OP 636

## 2023-12-09 PROCEDURE — 83605 ASSAY OF LACTIC ACID: CPT | Performed by: PHYSICIAN ASSISTANT

## 2023-12-09 PROCEDURE — 83735 ASSAY OF MAGNESIUM: CPT | Performed by: NURSE PRACTITIONER

## 2023-12-09 PROCEDURE — 81001 URINALYSIS AUTO W/SCOPE: CPT | Performed by: PHYSICIAN ASSISTANT

## 2023-12-09 PROCEDURE — P9047 ALBUMIN (HUMAN), 25%, 50ML: HCPCS | Performed by: PHYSICIAN ASSISTANT

## 2023-12-09 PROCEDURE — 250N000013 HC RX MED GY IP 250 OP 250 PS 637: Performed by: NURSE PRACTITIONER

## 2023-12-09 PROCEDURE — 99223 1ST HOSP IP/OBS HIGH 75: CPT | Mod: FS

## 2023-12-09 PROCEDURE — 85520 HEPARIN ASSAY: CPT | Performed by: SURGERY

## 2023-12-09 PROCEDURE — 71045 X-RAY EXAM CHEST 1 VIEW: CPT

## 2023-12-09 PROCEDURE — 83880 ASSAY OF NATRIURETIC PEPTIDE: CPT | Performed by: PHYSICIAN ASSISTANT

## 2023-12-09 PROCEDURE — 84100 ASSAY OF PHOSPHORUS: CPT

## 2023-12-09 PROCEDURE — 93970 EXTREMITY STUDY: CPT

## 2023-12-09 PROCEDURE — 255N000002 HC RX 255 OP 636: Performed by: STUDENT IN AN ORGANIZED HEALTH CARE EDUCATION/TRAINING PROGRAM

## 2023-12-09 PROCEDURE — 71045 X-RAY EXAM CHEST 1 VIEW: CPT | Mod: 77

## 2023-12-09 PROCEDURE — 250N000012 HC RX MED GY IP 250 OP 636 PS 637: Performed by: PHYSICIAN ASSISTANT

## 2023-12-09 PROCEDURE — 74176 CT ABD & PELVIS W/O CONTRAST: CPT | Mod: 26 | Performed by: RADIOLOGY

## 2023-12-09 PROCEDURE — 84145 PROCALCITONIN (PCT): CPT

## 2023-12-09 PROCEDURE — 200N000002 HC R&B ICU UMMC

## 2023-12-09 PROCEDURE — 36600 WITHDRAWAL OF ARTERIAL BLOOD: CPT

## 2023-12-09 PROCEDURE — 36415 COLL VENOUS BLD VENIPUNCTURE: CPT | Performed by: SURGERY

## 2023-12-09 PROCEDURE — 85027 COMPLETE CBC AUTOMATED: CPT | Performed by: NURSE PRACTITIONER

## 2023-12-09 PROCEDURE — 87640 STAPH A DNA AMP PROBE: CPT

## 2023-12-09 PROCEDURE — 36591 DRAW BLOOD OFF VENOUS DEVICE: CPT

## 2023-12-09 PROCEDURE — 74176 CT ABD & PELVIS W/O CONTRAST: CPT | Mod: MG

## 2023-12-09 PROCEDURE — 99222 1ST HOSP IP/OBS MODERATE 55: CPT | Performed by: PHYSICIAN ASSISTANT

## 2023-12-09 PROCEDURE — 74018 RADEX ABDOMEN 1 VIEW: CPT

## 2023-12-09 PROCEDURE — 250N000011 HC RX IP 250 OP 636: Mod: JZ | Performed by: PHYSICIAN ASSISTANT

## 2023-12-09 PROCEDURE — 250N000011 HC RX IP 250 OP 636: Performed by: PHYSICIAN ASSISTANT

## 2023-12-09 PROCEDURE — 82248 BILIRUBIN DIRECT: CPT | Performed by: NURSE PRACTITIONER

## 2023-12-09 PROCEDURE — 99223 1ST HOSP IP/OBS HIGH 75: CPT | Mod: 25 | Performed by: STUDENT IN AN ORGANIZED HEALTH CARE EDUCATION/TRAINING PROGRAM

## 2023-12-09 PROCEDURE — 250N000011 HC RX IP 250 OP 636: Mod: JZ

## 2023-12-09 PROCEDURE — G1010 CDSM STANSON: HCPCS | Performed by: RADIOLOGY

## 2023-12-09 RX ORDER — ALBUMIN (HUMAN) 12.5 G/50ML
50 SOLUTION INTRAVENOUS ONCE
Qty: 200 ML | Refills: 0 | Status: COMPLETED | OUTPATIENT
Start: 2023-12-09 | End: 2023-12-09

## 2023-12-09 RX ORDER — DIGOXIN 0.25 MG/ML
500 INJECTION INTRAMUSCULAR; INTRAVENOUS ONCE
Status: DISCONTINUED | OUTPATIENT
Start: 2023-12-09 | End: 2023-12-09

## 2023-12-09 RX ORDER — FENTANYL CITRATE 50 UG/ML
50-100 INJECTION, SOLUTION INTRAMUSCULAR; INTRAVENOUS ONCE
Status: COMPLETED | OUTPATIENT
Start: 2023-12-10 | End: 2023-12-10

## 2023-12-09 RX ORDER — TACROLIMUS 0.5 MG/1
0.5 CAPSULE ORAL
Status: DISCONTINUED | OUTPATIENT
Start: 2023-12-09 | End: 2023-12-12

## 2023-12-09 RX ORDER — PIPERACILLIN SODIUM, TAZOBACTAM SODIUM 3; .375 G/15ML; G/15ML
3.38 INJECTION, POWDER, LYOPHILIZED, FOR SOLUTION INTRAVENOUS EVERY 6 HOURS
Status: DISCONTINUED | OUTPATIENT
Start: 2023-12-09 | End: 2023-12-18

## 2023-12-09 RX ORDER — MYCOPHENOLATE MOFETIL 200 MG/ML
750 POWDER, FOR SUSPENSION ORAL
Status: DISCONTINUED | OUTPATIENT
Start: 2023-12-09 | End: 2023-12-16

## 2023-12-09 RX ORDER — LIDOCAINE HYDROCHLORIDE 20 MG/ML
JELLY TOPICAL ONCE
Status: COMPLETED | OUTPATIENT
Start: 2023-12-09 | End: 2023-12-09

## 2023-12-09 RX ORDER — FUROSEMIDE 10 MG/ML
40 INJECTION INTRAMUSCULAR; INTRAVENOUS ONCE
Status: COMPLETED | OUTPATIENT
Start: 2023-12-09 | End: 2023-12-09

## 2023-12-09 RX ORDER — FENTANYL CITRATE 50 UG/ML
INJECTION, SOLUTION INTRAMUSCULAR; INTRAVENOUS
Status: COMPLETED
Start: 2023-12-09 | End: 2023-12-10

## 2023-12-09 RX ORDER — FLUCONAZOLE 2 MG/ML
400 INJECTION, SOLUTION INTRAVENOUS EVERY 24 HOURS
Status: DISCONTINUED | OUTPATIENT
Start: 2023-12-09 | End: 2023-12-10

## 2023-12-09 RX ORDER — HEPARIN SODIUM 10000 [USP'U]/100ML
0-5000 INJECTION, SOLUTION INTRAVENOUS CONTINUOUS
Status: DISCONTINUED | OUTPATIENT
Start: 2023-12-09 | End: 2023-12-10

## 2023-12-09 RX ORDER — DIGOXIN 0.25 MG/ML
500 INJECTION INTRAMUSCULAR; INTRAVENOUS ONCE
Status: COMPLETED | OUTPATIENT
Start: 2023-12-09 | End: 2023-12-09

## 2023-12-09 RX ORDER — HYDROMORPHONE HCL IN WATER/PF 6 MG/30 ML
0.2 PATIENT CONTROLLED ANALGESIA SYRINGE INTRAVENOUS EVERY 4 HOURS PRN
Status: DISCONTINUED | OUTPATIENT
Start: 2023-12-09 | End: 2023-12-10

## 2023-12-09 RX ORDER — MAGNESIUM SULFATE 1 G/100ML
1 INJECTION INTRAVENOUS ONCE
Status: COMPLETED | OUTPATIENT
Start: 2023-12-09 | End: 2023-12-09

## 2023-12-09 RX ADMIN — MYCOPHENOLATE MOFETIL 750 MG: 200 POWDER, FOR SUSPENSION ORAL at 17:38

## 2023-12-09 RX ADMIN — PREDNISONE 5 MG: 5 TABLET ORAL at 07:42

## 2023-12-09 RX ADMIN — TACROLIMUS 0.5 MG: 0.5 CAPSULE ORAL at 17:38

## 2023-12-09 RX ADMIN — DIGOXIN 500 MCG: 0.25 INJECTION INTRAMUSCULAR; INTRAVENOUS at 08:24

## 2023-12-09 RX ADMIN — GABAPENTIN 100 MG: 100 CAPSULE ORAL at 07:41

## 2023-12-09 RX ADMIN — LIDOCAINE HYDROCHLORIDE: 20 JELLY TOPICAL at 13:18

## 2023-12-09 RX ADMIN — PIPERACILLIN AND TAZOBACTAM 3.38 G: 3; .375 INJECTION, POWDER, LYOPHILIZED, FOR SOLUTION INTRAVENOUS at 09:10

## 2023-12-09 RX ADMIN — MAGNESIUM OXIDE TAB 400 MG (241.3 MG ELEMENTAL MG) 800 MG: 400 (241.3 MG) TAB at 13:26

## 2023-12-09 RX ADMIN — SODIUM CHLORIDE, POTASSIUM CHLORIDE, SODIUM LACTATE AND CALCIUM CHLORIDE 1000 ML: 600; 310; 30; 20 INJECTION, SOLUTION INTRAVENOUS at 11:49

## 2023-12-09 RX ADMIN — SODIUM CHLORIDE 1000 ML: 9 INJECTION, SOLUTION INTRAVENOUS at 14:50

## 2023-12-09 RX ADMIN — PIPERACILLIN AND TAZOBACTAM 3.38 G: 3; .375 INJECTION, POWDER, LYOPHILIZED, FOR SOLUTION INTRAVENOUS at 15:02

## 2023-12-09 RX ADMIN — HYDROMORPHONE HYDROCHLORIDE 0.1 MG: 0.2 INJECTION, SOLUTION INTRAMUSCULAR; INTRAVENOUS; SUBCUTANEOUS at 03:36

## 2023-12-09 RX ADMIN — MAGNESIUM SULFATE IN DEXTROSE 1 G: 10 INJECTION, SOLUTION INTRAVENOUS at 09:10

## 2023-12-09 RX ADMIN — TACROLIMUS 1.5 MG: 1 CAPSULE ORAL at 07:41

## 2023-12-09 RX ADMIN — SODIUM CHLORIDE 1000 ML: 9 INJECTION, SOLUTION INTRAVENOUS at 10:25

## 2023-12-09 RX ADMIN — SULFAMETHOXAZOLE AND TRIMETHOPRIM 1 TABLET: 400; 80 TABLET ORAL at 07:42

## 2023-12-09 RX ADMIN — FLUCONAZOLE IN SODIUM CHLORIDE 400 MG: 2 INJECTION, SOLUTION INTRAVENOUS at 11:49

## 2023-12-09 RX ADMIN — MAGNESIUM OXIDE TAB 400 MG (241.3 MG ELEMENTAL MG) 800 MG: 400 (241.3 MG) TAB at 20:08

## 2023-12-09 RX ADMIN — PIPERACILLIN AND TAZOBACTAM 3.38 G: 3; .375 INJECTION, POWDER, LYOPHILIZED, FOR SOLUTION INTRAVENOUS at 20:08

## 2023-12-09 RX ADMIN — MIDAZOLAM 2 MG: 1 INJECTION INTRAMUSCULAR; INTRAVENOUS at 23:54

## 2023-12-09 RX ADMIN — FUROSEMIDE 40 MG: 10 INJECTION, SOLUTION INTRAVENOUS at 13:29

## 2023-12-09 RX ADMIN — VANCOMYCIN HYDROCHLORIDE 1500 MG: 10 INJECTION, POWDER, LYOPHILIZED, FOR SOLUTION INTRAVENOUS at 11:48

## 2023-12-09 RX ADMIN — GABAPENTIN 200 MG: 100 CAPSULE ORAL at 13:26

## 2023-12-09 RX ADMIN — ALBUMIN HUMAN 50 G: 0.25 SOLUTION INTRAVENOUS at 22:36

## 2023-12-09 RX ADMIN — NOREPINEPHRINE BITARTRATE 0.05 MCG/KG/MIN: 1 INJECTION, SOLUTION, CONCENTRATE INTRAVENOUS at 22:47

## 2023-12-09 RX ADMIN — HEPARIN SODIUM AND DEXTROSE 1200 UNITS/HR: 10000; 5 INJECTION INTRAVENOUS at 14:00

## 2023-12-09 RX ADMIN — SODIUM CHLORIDE, POTASSIUM CHLORIDE, SODIUM LACTATE AND CALCIUM CHLORIDE: 600; 310; 30; 20 INJECTION, SOLUTION INTRAVENOUS at 14:49

## 2023-12-09 RX ADMIN — GABAPENTIN 100 MG: 100 CAPSULE ORAL at 20:09

## 2023-12-09 RX ADMIN — SODIUM CHLORIDE, POTASSIUM CHLORIDE, SODIUM LACTATE AND CALCIUM CHLORIDE: 600; 310; 30; 20 INJECTION, SOLUTION INTRAVENOUS at 03:37

## 2023-12-09 RX ADMIN — LEVOTHYROXINE SODIUM 88 MCG: 88 TABLET ORAL at 05:51

## 2023-12-09 RX ADMIN — PANTOPRAZOLE SODIUM 40 MG: 40 TABLET, DELAYED RELEASE ORAL at 07:41

## 2023-12-09 RX ADMIN — AMIODARONE HYDROCHLORIDE 1 MG/MIN: 50 INJECTION, SOLUTION INTRAVENOUS at 21:40

## 2023-12-09 RX ADMIN — CIPROFLOXACIN 500 MG: 500 TABLET, FILM COATED ORAL at 00:01

## 2023-12-09 RX ADMIN — SENNOSIDES AND DOCUSATE SODIUM 1 TABLET: 8.6; 5 TABLET ORAL at 00:01

## 2023-12-09 RX ADMIN — MYCOPHENOLIC ACID 540 MG: 360 TABLET, DELAYED RELEASE ORAL at 07:41

## 2023-12-09 RX ADMIN — HUMAN ALBUMIN MICROSPHERES AND PERFLUTREN 6 ML: 10; .22 INJECTION, SOLUTION INTRAVENOUS at 11:26

## 2023-12-09 RX ADMIN — SENNOSIDES AND DOCUSATE SODIUM 1 TABLET: 8.6; 5 TABLET ORAL at 20:08

## 2023-12-09 RX ADMIN — BISACODYL 10 MG: 10 SUPPOSITORY RECTAL at 14:43

## 2023-12-09 RX ADMIN — MIDAZOLAM 2 MG: 1 INJECTION INTRAMUSCULAR; INTRAVENOUS at 23:28

## 2023-12-09 RX ADMIN — ATORVASTATIN CALCIUM 40 MG: 40 TABLET, FILM COATED ORAL at 20:09

## 2023-12-09 RX ADMIN — ALLOPURINOL 300 MG: 300 TABLET ORAL at 07:42

## 2023-12-09 ASSESSMENT — ACTIVITIES OF DAILY LIVING (ADL)
DIFFICULTY_COMMUNICATING: NO
DRESSING/BATHING_DIFFICULTY: NO
ADLS_ACUITY_SCORE: 25
DIFFICULTY_EATING/SWALLOWING: NO
FALL_HISTORY_WITHIN_LAST_SIX_MONTHS: NO
ADLS_ACUITY_SCORE: 35
ADLS_ACUITY_SCORE: 25
TOILETING_ISSUES: NO
CHANGE_IN_FUNCTIONAL_STATUS_SINCE_ONSET_OF_CURRENT_ILLNESS/INJURY: NO
ADLS_ACUITY_SCORE: 25
ADLS_ACUITY_SCORE: 35
ADLS_ACUITY_SCORE: 25
ADLS_ACUITY_SCORE: 37
ADLS_ACUITY_SCORE: 35
ADLS_ACUITY_SCORE: 37
ADLS_ACUITY_SCORE: 35
CONCENTRATING,_REMEMBERING_OR_MAKING_DECISIONS_DIFFICULTY: NO
ADLS_ACUITY_SCORE: 35
ADLS_ACUITY_SCORE: 22
WALKING_OR_CLIMBING_STAIRS_DIFFICULTY: NO
DOING_ERRANDS_INDEPENDENTLY_DIFFICULTY: NO
WEAR_GLASSES_OR_BLIND: YES

## 2023-12-09 NOTE — PROGRESS NOTES
Cross-cover note: 1:25 AM 12/9/2023 12/09/2023    General Surgery Cross Cover Note    Patient assessment following unresponsive episode    Per patient, he is still having left sided abdominal pain although overall he is feeling better after NG placement. Overall, feels tired. Patient denies dizziness or lightheadedness, although visitor in room says he gets lightheaded with movement which likely contributed to unresponsive episode. Denies chest pain or shortness of breath. Some nausea, no vomiting. Also noted some pain with urination and dark colored urine.     B/P: 98/66, T: 97.7, P: 109, R: 24    PE:  A&O x3, NAD  Breathing non-labored on 3L NC  Abd distended but mostly compressible, mild TTP    Plan:  Patient is a 68-year old with PMHx significant for s/p simultaneous liver/kidney transplant 6/2023 secondary to cirrhosis (alcohol and hemochromatosis) and ESKD (IGA-nephropathy and ANCA vasculitis), s/p umbilical hernia with mesh repair and DHEERAJ 12/8/23. Other pertinent PMHx significant for pAfib on apixaban (currently held due to recent surgery), HTN, CAD, rheumatoid/psoriatic arthritis. Discharged from PACU on 12/8 following umbilical hernia repair but returned early AM on 12/9 due to severe sudden abdominal pain. Admitted to transplant service for monitoring. In ED, patient had episode of unresponsiveness- possible seizure vs apnea. Extensive workup with evidence of A fib w RVR, s/p PO dose of metoprolol.    - Patient stable  - Continue to follow with serial exams given episode of unresponsiveness   - Continue to monitor tele   - Electrolyte replacement   - Troponin 85>99, continue to trend. Suspect demand ischemia in setting of Afib w RVR    Please page resident on call,     Flores Steve, MS3    I evaluated the patient and agree with the plan above.    Dalila Cisneros,   General Surgery Resident, PGY-1

## 2023-12-09 NOTE — PHARMACY-VANCOMYCIN DOSING SERVICE
"Pharmacy Vancomycin Initial Note  Date of Service 2023  Patient's  1955  68 year old, male    Indication: Intra-abdominal infection    Current estimated CrCl = Estimated Creatinine Clearance: 78.4 mL/min (based on SCr of 1.08 mg/dL).    Creatinine for last 3 days  2023: 11:09 AM Creatinine 0.96 mg/dL;  7:48 PM Creatinine 1.00 mg/dL; 11:09 AM Creatinine POCT 1.1 mg/dL  2023:  1:59 AM Creatinine 1.08 mg/dL    Recent Vancomycin Level(s) for last 3 days  No results found for requested labs within last 3 days.      Vancomycin IV Administrations (past 72 hours)                     vancomycin (VANCOCIN) 1,000 mg in 200 mL dextrose intermittent infusion (mg) 1,000 mg Given 23 1009                    Nephrotoxins and other renal medications (From now, onward)      Start     Dose/Rate Route Frequency Ordered Stop    23 0930  vancomycin (VANCOCIN) 1,500 mg in 0.9% NaCl 250 mL intermittent infusion         1,500 mg  over 90 Minutes Intravenous EVERY 18 HOURS 23 0904      23 0825  piperacillin-tazobactam (ZOSYN) 3.375 g vial to attach to  mL bag        Note to Pharmacy: For [facility], [facility] and [facility]: For Zosyn-naive patients, use the \"Zosyn initial dose + extended infusion\" order panel.    3.375 g  over 30 Minutes Intravenous EVERY 6 HOURS 23 0823      23 1800  tacrolimus (GENERIC) capsule 1.5 mg         1.5 mg Oral 2 TIMES DAILY. 23 1313              Contrast Orders - past 72 hours (72h ago, onward)      Start     Dose/Rate Route Frequency Stop    23 1155  iohexol (OMNIPAQUE) 140 MG/ML solution for oral use 25 mL         25 mL Oral EVERY 30 MIN 23 1225            ChalkflyRAOBiome Prediction of Planned Initial Vancomycin Regimen  Regimen: 1500 mg IV every 18 hours.  Start time: 09:03 on 2023  Exposure target: AUC24 (range)400-600 mg/L.hr   AUC24,ss: 477 mg/L.hr  Probability of AUC24 > 400: 70 %  Ctrough,ss: 14.5 mg/L  Probability of Ctrough,ss > 20: " 21 %  Probability of nephrotoxicity (Lodise LIDYA 2009): 10 %          Plan:  Start vancomycin  1500 mg IV q18h.   Vancomycin monitoring method: AUC  Vancomycin therapeutic monitoring goal: 400-600 mg*h/L  Pharmacy will check vancomycin levels as appropriate in 1-3 Days.    Serum creatinine levels will be ordered daily for the first week of therapy and at least twice weekly for subsequent weeks.      Virginia Rendon, [facility]

## 2023-12-09 NOTE — PROGRESS NOTES
"Patient doesn't want to be \"poked\" anymore for an IV, is interested a PICC line instead. Rn made aware of the conversation and will reach out to the team for guidance.  "

## 2023-12-09 NOTE — PROGRESS NOTES
Patient seen and examined.  Overnight events and early morning events reviewed    Postumbilical hernia repair.  Presented yesterday with acute onset lower abdominal pain.  Not passing flatus or stool.  /68   Pulse 110   Temp 97.4  F (36.3  C) (Oral)   Resp 25   SpO2 97%   Examination of the abdomen: The abdomen is tender diffusely no guarding or rigidity    Reviewed the CT scans with the radiologist: A repeat CT scan done today does not show any evidence of perforation or obstruction.    Reviewed the laboratory values.    Clinical impression:    Status postumbilical hernia repair suspected early sepsis  CT scans do not show any evidence of surgical issues so would not consider exploration at this time.  Cardiology opinion for A-fib/RVR  Continue IV fluids resuscitation and Lasix to improve the urine output.    I spoke to the patient's family both wife and daughter and explained the above clinical details all questions answered.

## 2023-12-09 NOTE — PROGRESS NOTES
Transplant Surgery  Inpatient Daily Progress Note  12/09/2023    Assessment & Plan: Damion Quinones is a 68 year old male with a history of PAF, obesity, HTN, pre-diabetes, rheumatoid and psoriatic arthritis, CAD, and cirrhosis (alcohol + hemochromatosis) and 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 He underwent a redo mesh repair of umbilical hernia and minimal DHEERAJ of small bowel 12/7/23 with Dr. Clifton. He now presents from home with severe LLQ abdominal pain and N/V.     s/p Redo mesh repair of umbilical hernia and minimal DHEERAJ of small bowel: POD#2. Nausea with vomiting, abdominal distention, and no return of bowel function yet. NGT placed with improvement in nausea and abdominal pain. Plan for dulcolax supp today.   - AXR: No e/o obstruction   - CT scan 12/8 and 12/9 no obstruction or evidence of perforated bowel   - PRN analgesics/antiemetics   - NPO status with NGT to LIS     Hx SLK 6/6/23:  Liver: LFTs WNL.   Kidney: Cr at baseline ~0.8-1. Low UO. IVF resuscitation. Lux placed to monitor output.      Immunosuppressed status secondary to medications:   Maintenance:    - Myfortic 540 mg BID -> switch to  mg susp   - Tacrolimus 1.5 mg BID. Goal level 6-8. 11/21 level 11.4. Dose reduced to 1.5 mg BID. No repeat level. Diflucan started d/t concern for infection. Start tac SL due to concern for absorption and reduce dose due to diflucan. Tac 0.5 SL BID. Check level   - Prednisone 5 mg daily     Neuro:  Peripheral neuropathy: Follows with Neurology. PTA gabapentin, capsaicin cream.   Acute post-op pain: LLQ abdominal pain.    - PRN IV Dilaudid while NPO     Hematology: No issues.      Cardiorespiratory:   Hx CAD: PTA atorvastatin.   PAF with RVR and hypotension: PTA metoprolol 50 mg BID. PTA apixaban on HOLD for now. Hold metoprolol due to hypotension. Cardiology consulted for recommendations for rate control as unable to give metoprolol. Digoxin x 1 dose for rate control. BNP WNL. ECHO  technically difficult study; EF 55-60%. Replace volume. Restart metoprolol 12.5 mg BID once SBP > 120.   Respiratory insufficiency: Secondary to pulmonary edema: 12/9 CXR pulm edema and atelectasis in the setting of low lung volumes. Stable on NC 5L->3L. Titrate oxygen as tolerated. Monitor closely with fluid resuscitation. Start IS- discussed with patient and family.  Tachycardia: Concern for PE/DVT given surgery and history of provoked DVT/PE. PTA apixaban held. Start heparin gtt (low intensity d/t recent surgery). BLE US negative for DVT. No Chest CT PE d/t not wanting to give contrast at this time.      GI/Nutrition: NPO due to N/V as above. NGT to LIS. Dulcolax supp     Endocrine:   Hypothyroidism: PTA levothyroxine.     Fluid/Electrolytes:   Hypervolemia with intervascular hypovolemia: Fluid resuscitation with 2250ml crystalloid plus MIV on admission/overnight. Received 3L crystalloid today. Continue MIV  ml/hr. Monitor respiratory status  Hypomagnesemia: PTA Mag-Ox 800 mg BID.     : No issues.      Infectious disease: afebrile. No leukocytosis.   R/o sepsis vs SIRS: Elevated lactic acid, up to 3.8. CT scan abd/pelvis with no bowel leak/perforation. BC and UA/UC. Start empiric antibiotics. Monitor lactic acid q hr. Continue IVFs. Last lactic acid 3.3, slight decrease.      Rheum:  Hx Gout: Follows with Rheumatology. PTA allopurinol, prednisone, PRN anakinra.      Prophylaxis: DVT (mechanical while apixaban on hold), fall, GI (PPI), pneumocystis (Bactrim), allison-op (Cipro)     Disposition: Transfer to  (intermediate care)      GEORGE/Fellow/Resident Provider: Shannon Delacruz PA-C     Faculty: Jhon White MD      __________________________________________________________________  Transplant History: Admitted 12/8/2023 for abdominal pain.   6/6/2023 (Liver), 6/6/2023 (Kidney), Postoperative day: 186 (Liver), 186 (Kidney)     Interval History: History is obtained from the patient  Overnight events:  Nausea and abdominal pain improved after NGT placed. Patient rate pain 3-4/10. No BM or flatus.     Denies SOB or CP.    Decreased UO since presenting to ED yesterday.    ROS:   A 10-point review of systems was negative except as noted above.    Curent Meds:   allopurinol  300 mg Oral Daily    atorvastatin  40 mg Oral QPM    bisacodyl  10 mg Rectal Once    ciprofloxacin  500 mg Oral BID    gabapentin  100 mg Oral BID    gabapentin  200 mg Oral Daily    levothyroxine  88 mcg Oral Daily    magnesium oxide  800 mg Oral BID    metoprolol tartrate  50 mg Oral BID    mycophenolic acid  540 mg Oral BID IS    pantoprazole  40 mg Oral QAM AC    predniSONE  5 mg Oral Daily    senna-docusate  1 tablet Oral At Bedtime    sodium chloride (PF)  3 mL Intracatheter Q8H    sulfamethoxazole-trimethoprim  1 tablet Oral Daily    tacrolimus  1.5 mg Oral BID IS       Physical Exam:     Admit      Current Vitals:   /68   Pulse 115   Temp 97.7  F (36.5  C) (Oral)   Resp 14   SpO2 97%          Vital sign ranges:    Temp:  [97.6  F (36.4  C)-98.1  F (36.7  C)] 97.7  F (36.5  C)  Pulse:  [] 115  Resp:  [14-31] 14  BP: ()/() 101/68  SpO2:  [93 %-97 %] 97 %  Patient Vitals for the past 24 hrs:   BP Temp Temp src Pulse Resp SpO2   12/09/23 0655 -- -- -- 115 -- --   12/09/23 0630 101/68 -- -- -- -- --   12/09/23 0500 93/68 -- -- (!) 138 14 97 %   12/09/23 0400 112/75 -- -- (!) 125 27 96 %   12/09/23 0100 98/66 -- -- 109 24 97 %   12/09/23 0030 92/74 -- -- 105 23 95 %   12/09/23 0000 115/82 -- -- 107 24 95 %   12/08/23 2330 121/83 -- -- 108 22 96 %   12/08/23 2315 109/78 -- -- 101 26 95 %   12/08/23 2300 93/70 -- -- 102 21 97 %   12/08/23 2245 94/69 -- -- 104 20 96 %   12/08/23 2045 123/86 -- -- 114 23 --   12/08/23 2030 126/88 -- -- (!) 123 30 94 %   12/08/23 2015 124/89 -- -- (!) 125 25 93 %   12/08/23 2000 (!) 133/96 -- -- (!) 122 (!) 31 94 %   12/08/23 1950 122/87 -- -- 119 28 97 %   12/08/23 1920 124/84 97.7  F  "(36.5  C) Oral 105 16 95 %   12/08/23 1236 (!) 148/101 98.1  F (36.7  C) Oral 103 20 94 %   12/08/23 1043 (!) 163/89 97.6  F (36.4  C) Oral 99 20 96 %       /68   Pulse 115   Temp 97.7  F (36.5  C) (Oral)   Resp 14   SpO2 97%   General Appearance: resting comfortably in bed  Respiratory: BCTA. On 3L NC  Cardiovascular: Afib RVR  GI: abdomen soft, diffusely tender to palpation, abdominal distention. No rebound or guarding Umbilical hernia repair incision C/D/I, open to air.   Genitourinary: no Lux present  Skin: warm, dry  Musculoskeletal: BLE pedal edema  Neurologic: A&OX4. No confusion  Psychiatric: calm, behavior appropriate to situation    Frailty Scores          12/27/2022 11/22/2022   Frailty Scores   Final Score Frail Frail   Final Score Number 3 4       Data:   CMP  Recent Labs   Lab 12/09/23  0159 12/08/23 2002 12/08/23 1948 12/08/23  1109    139 140 139   POTASSIUM 4.9 4.2 4.3 3.7   CHLORIDE 104  --  103 105   CO2 21*  --  25 22   * 165* 169* 186*   BUN 20.3  --  16.8 18.9   CR 1.08  --  1.00 0.96  1.1   GFRESTIMATED 76  --  83 >60  87   NAZARIO 8.6*  --  8.9 8.5*   ICAW  --  4.9  --   --    MAG 1.8  --  1.4*  --    PHOS 3.0  --  2.2*  --    ALBUMIN  --   --   --  3.8   BILITOTAL  --   --   --  0.4   ALKPHOS  --   --   --  133   AST  --   --   --  19   ALT  --   --   --  10     CBC  Recent Labs   Lab 12/09/23  0554 12/08/23 2002 12/08/23 1948   HGB 17.7 18.4* 17.1   WBC 5.0  --  5.9     --  287     COAGSNo lab results found in last 7 days.    Invalid input(s): \"XA\"   Urinalysis  Recent Labs   Lab Test 11/07/23  0753 09/27/23  1157   COLOR Yellow Dark Yellow*   APPEARANCE Clear Clear   URINEGLC Negative 50*   URINEBILI Small* Small*   URINEKETONE Negative Negative   SG 1.015 1.029   UBLD Negative Negative   URINEPH 7.0 6.0   PROTEIN Trace* 70*   NITRITE Negative Negative   LEUKEST Trace* Negative   RBCU 0-2 1   WBCU 10-25* 7*     Virology:  Hepatitis C Antibody   Date Value " Ref Range Status   06/05/2023 Nonreactive Nonreactive Final     BK Virus DNA copies/mL   Date Value Ref Range Status   10/05/2023 Not Detected Not Detected copies/mL Final   09/05/2023 Not Detected Not Detected copies/mL Final   08/29/2023 Not Detected Not Detected copies/mL Final   08/21/2023 Not Detected Not Detected copies/mL Final   08/17/2023 Not Detected Not Detected copies/mL Final   08/01/2023 Not Detected Not Detected copies/mL Final   07/31/2023 Not Detected Not Detected copies/mL Final

## 2023-12-09 NOTE — PROGRESS NOTES
Surgery Cross-Cover Note  12/8/2023     Paged regarding: Concern for unresponsive episode -- possible seizure versus apnea    Subjective: Patient is a 68-year old with PMHx significant for s/p simultaneous liver/kidney transplant 6/2023 secondary to cirrhosis (alcohol and hemochromatosis) and ESKD (IGA-nephropathy and ANCA vasculitis), s/p umbilical hernia with mesh repair and DHEERAJ 12/8/23. Other pertinent PMHx significant for pAfib on apixaban (currently held due to recent surgery), HTN, CAD, rheumatoid/psoriatic arthritis. Discharged from PACU on 12/8 following umbilical hernia repair but returned early AM on 12/9 due to severe sudden abdominal pain. No significant lab or image findings on admission. Admitted to the transplant service for monitoring.     Paged by ED for stat evaluation. Per report, patient was being moved from his hallway bed to an ED room. While ambulating, patient started complaining of some chest discomfort vs palpitations extending into his clavicle/shoulder. Supposedly short of breath and perhaps even had an episode of not breathing, along with concern for possible seizure-like activity. ED physician alerted - iSTAT drawn, EKG/CXR ordered, and ativan given for concern of possible seizure activity though I do not see this charted.     On my arrival, patient is alert and oriented x4. Clearly able to state person, place, time/year, and reason for current admit. Does endorse some ongoing chest pain/palpitation extending into the shoulder region along with ongoing abdominal pain. Is burping and slightly nauseous. Not passing flatus. No bowel movements since presentation. Physical exam as below.     Objective:  /84   Pulse 105   Temp 97.7  F (36.5  C) (Oral)   Resp 16   SpO2 95%     General: AAOx4, Slightly pale and diaphoretic but not in acute distress  Pulm: NLB on minimal O2  CV: Afib RVR on tele  Abd: Distended, Tympanitic, Diffuse tenderness with palpation, Umbilical hernia site with  expected post-operative bruising, No wound concerns  MSK/Skin: WWP, Moving extremities independently    Assessment/Plan:  Damion Quinones is a 68 year old with PMHx significant for the above, most recently s/p umbilical hernia repair with mesh and DHEERAJ 12/8/23. Presented on 12/9 with worsening abdominal pain, no acute lab or image concerns. Developed momentary episode of unresponsiveness however on my assessment is alert and oriented x4, no evidence of gross neurologic abnormalities. Following commands and without speech difficulties.     - No need for additional access at this time, PIVs present and functioning  - Blood glucoses 165, No acute hypoglycemia concerns  - EKG and tele reviewed. Patient has known history of Afib with anticoagulation currently held in setting of recent post-operative state. Appears to be in Afib RVR, however maintaining blood pressures. Will give PO dose of metoprolol early and continue tele monitoring. If ongoing s/s of Afib RVR, will consider escalation. No evidence of hemodynamic instability requiring cardioversion at this time.   - STAT troponin sent, slightly elevated to 85 (has previously been higher in other settings). Given chest pain concern and Afib, will trend q4H. However, no acute MI findings on EKG. Suspect demand ischemia, however will follow.   - STAT BMP sent. Electrolytes low -- K 4.2, Mg 1.4, Phos 2.2. Will plan on aggressive replacement given above cardiac concerns. Cr normal.   - STAT CBC sent. WBC remains WNL. Remains afebrile. No acute indication for antibiotics.   - CXR reviewed. Evidence of fluid overload however no acute pathology. Will plan on judicious volume replacement. Continue LR 125cc/hr and will give 250cc bolus.   - CT-AP reviewed, no acute intra-abdominal process seen to my eyes. Given abdominal distention and ongoing burping, suspect lleus. Will place NGT and set to LIS. Continue NPO and IVF. Continue trending lactate, suspect elevation secondary to  intravascular depletion. Judicious volume repletion as above.   - Given normal mentation and neurologic status on my exam, will not plan on head imaging at this time. However, will follow with serial exams overnight.     Plans to be discussed with transplant fellow    Marley Clark MD  General Surgery, PGY-2

## 2023-12-09 NOTE — ED NOTES
Patient up to transfer and became unresponsive but had a pulse. Staff assist called and ED MD bedside. Patient woke shortly after and A&Ox4. Inpatient team paged to come bedside stat to further assess patient.

## 2023-12-09 NOTE — MEDICATION SCRIBE - ADMISSION MEDICATION HISTORY
Medication Scribe Admission Medication History    Admission medication history is complete. The information provided in this note is only as accurate as the sources available at the time of the update.    Information Source(s): Facility ([facility]/) medication list/MAR, CareEverywhere/SureScripts, and Discharge summary  via in-person    Pertinent Information: Patient underwent surgery on 12/7/2023  and medications were updated then. Writer verified medication list per CareEverywhere and Dispensary report. No new medications added/changed/deleted.     Changes made to PTA medication list:  Added: None  Deleted: None  Changed: None    Medication Affordability:  Not including over the counter (OTC) medications, was there a time in the past 3 months when you did not take your medications as prescribed because of cost?: No    Allergies reviewed with patient and updates made in EHR: no    Medication History Completed By: Soniya Chavez 12/9/2023 9:58 AM    PTA Med List   Medication Sig Last Dose    acetaminophen (TYLENOL) 500 MG tablet Take 1 tablet (500 mg) by mouth every 6 hours as needed for mild pain Unknown    Alcohol Swabs PADS Use to swab the area of the injection or quyen as directed Per insurance coverage     allopurinol (ZYLOPRIM) 300 MG tablet Take 1 tablet by mouth daily Unknown    anakinra (KINERET) 100 MG/0.67ML SOSY injection Inject 0.67 mLs (100 mg) Subcutaneous daily as needed Unknown    apixaban ANTICOAGULANT (ELIQUIS ANTICOAGULANT) 5 MG tablet Take 1 tablet (5 mg) by mouth 2 times daily Unknown    atorvastatin (LIPITOR) 40 MG tablet Take 1 tablet (40 mg) by mouth every evening Unknown    capsaicin (ZOSTRIX) 0.025 % external cream Apply to feet three times per day Unknown    ciprofloxacin (CIPRO) 500 MG tablet Take 1 tablet (500 mg) by mouth 2 times daily Unknown    gabapentin (NEURONTIN) 100 MG capsule Take 1 capsule (100 mg) by mouth 3 times daily for 90 days (Patient taking differently: Take 100 mg by  mouth 3 times daily 100 mg in am, 200 mg in the afternoon and 100 mg at bedtime by mouth daily) Unknown    levothyroxine (SYNTHROID/LEVOTHROID) 88 MCG tablet Take 1 tablet (88 mcg) by mouth daily (Patient taking differently: Take 88 mcg by mouth daily Takes in the middle of the night daily) Unknown    magnesium oxide (MAG-OX) 400 MG tablet Take 2 tablets (800 mg) by mouth 2 times daily Unknown    metoprolol tartrate (LOPRESSOR) 50 MG tablet Take 1 tablet (50 mg) by mouth 2 times daily for 90 days Unknown    multivitamin w/minerals (THERA-VIT-M) tablet Take 1 tablet by mouth daily Unknown    MYFORTIC (BRAND) 180 MG EC tablet Take 3 tablets (540 mg) by mouth 2 times daily Unknown    oxyCODONE (ROXICODONE) 5 MG tablet Take 1 tablet (5 mg) by mouth every 6 hours as needed for pain Unknown    pantoprazole (PROTONIX) 40 MG EC tablet Take 1 tablet (40 mg) by mouth every morning (before breakfast) Unknown    predniSONE (DELTASONE) 5 MG tablet Take 5 mg by mouth daily Unknown    SENNA-docusate sodium (SENNA S) 8.6-50 MG tablet Take 1 tablet by mouth at bedtime Take while taking narcotics Unknown    Sharps Container MISC Use as directed to dispose of needles, lancets and other sharps Unknown    simethicone (MYLICON) 125 MG chewable tablet Take 125 mg by mouth 4 times daily as needed for intestinal gas Unknown    sulfamethoxazole-trimethoprim (BACTRIM) 400-80 MG tablet Take 1 tablet by mouth daily Unknown    tacrolimus (GENERIC) 0.5 MG capsule Take 1 capsule (0.5 mg) by mouth every 12 hours Total dose: 1.5mg BID Unknown    tacrolimus (GENERIC) 1 MG capsule Take 1 capsule (1 mg) by mouth every 12 hours Total dose: 1.5mg BID Unknown    tadalafil (CIALIS) 5 MG tablet Take 1 tablet (5 mg) by mouth daily as needed (take 1-2 tablets at least 30 minutes prior to sexual activity.) Unknown    triamcinolone (KENALOG) 0.1 % external ointment Apply topically 2 times daily Unknown    ursodiol (ACTIGALL) 250 MG tablet Take 1 tablet (250 mg)  by mouth 2 times daily Unknown

## 2023-12-09 NOTE — ED NOTES
Surg Liver Transplant team inpatient resident paged the message below.    ED12 Damion MYERS MRN #[id number]. Just wanted to verify the IV potassium orders. Last lab drawn potassium was 4.2 at 20:02pm. Do you still want me to give Potassium chloride and Potassium Phosphate? Thank you, May PADGETT *[phone]

## 2023-12-09 NOTE — CONSULTS
[facility]    Cardiology Consult Note-Cardiology      Date of Admission:  12/8/2023  Consult Requested by: Dr. Shannon Delacruz  Reason for Consult: Atrial fib with RVR.     Assessment & Plan: HVSL   Damion Quinones is a 68 year old male with past medical history for paroxysmal atrial fibrillation, obesity, hypertension, prediabetes, rheumatoid and psoriatic arthritis. He was referred to us for atrial fibrillation management.    In brief, patient underwent a redo mesh repair of umbilical hernia and minimal DHEERAJ of the small bowel on 2/7. Yesterday he presented to the emergency department for evaluation of severe left lower quadrant abdominal pain associated with nausea and vomiting. In that context, he was noted to be in atrial fibrillation with rapid ventricular response (heart rate in the 130s-150s).  Primary team ordered IV digoxin 500 mcg. Current heart rates in the 120s to 130s.    I spoke to the patient this morning. He denies chest pain or shortness of breath. His main problem appears to be left lower quadrant abdominal pain.  Currently he is being evaluated by surgery team.    Regarding his atrial fibrillation, he is on beta-blockers for rate control and apixaban for stroke prophylaxis at home.  Previously echo to admission with preserved left ventricular function, ejection fraction 60-65%. Atrial fibrillation with rapid ventricular response appears to be driven by pain. Recommend restarting PTA beta-blockers to avoid rebound tachycardia as well as controlling pain better.     1.  Paroxysmal atrial fibrillation (CHADVAS score at least 3)  -restart PTA beta-blockers.  -HR goal between 110-130 while being under acute stress.   -continue PTA apixaban. If there is plan for surgery, then he can be bridged with heparin.  -electrolytes K > 4 and Mag > 2  -Please call us if questions      The patient's care was discussed with the Attending Physician, Dr. Beck  ".      Jorge Reyes Castro, MD  [facility]    Clinically Significant Risk Factors Present on Admission            # Hypomagnesemia: Lowest Mg = 1.4 mg/dL in last 2 days, will replace as needed    # Drug Induced Coagulation Defect: home medication list includes an anticoagulant medication    # Hypertension: Noted on problem list   # Acute Respiratory Failure: based on blood gas results.  Continue supplemental oxygen as needed     # Obesity: Estimated body mass index is 36.88 kg/m  as calculated from the following:    Height as of 12/7/23: 1.702 m (5' 7\").    Weight as of 12/7/23: 106.8 kg (235 lb 7.2 oz).               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 He underwent a redo mesh repair of umbilical hernia and minimal DHEERAJ of small bowel 12/7/23 with Dr. Clifton.     This morning I spoke to the patient in the emergency department.  He was accompanied by family members.  He reported doing slightly better.  His pain had improved from 8-6 in a scale of 10.  Denies chest pain or shortness of breath.  He tells me that prior to umbilical hernia repair he was able to walk without any limitations.  Endorses ongoing compliance with his medications including beta-blocker and apixaban.    Past Medical History    Past Medical History:   Diagnosis Date    Alcoholic cirrhosis of liver with ascites (H) 10/11/2019    ANCA-associated vasculitis (H) 2022    Antiplatelet or antithrombotic long-term use     C. difficile colitis     Coronary artery disease     [facility] 4/2023 - complete occlusion of RCA    ESRD (end stage renal disease) on dialysis (H)     Gout  "    HCC (hepatocellular carcinoma) (H) 2023    History of hemochromatosis 10/11/2019    Hypertension     IgA nephropathy     Obesity     PAF (paroxysmal atrial fibrillation) (H)     Pre-diabetes     Psoriatic arthritis (H)     RA (rheumatoid arthritis) (H)     Status post kidney transplant 2023    Induction with thymoglobulin 4mg/kg, + DSA CW9    Status post liver transplantation (H) 2023       Past Surgical History   Past Surgical History:   Procedure Laterality Date    APPENDECTOMY      Removed at 18 Years Old     BENCH KIDNEY  2023    Procedure: Bench kidney;  Surgeon: Chad Clifton MD;  Location:  OR    BENCH LIVER  2023    Procedure: Bench liver;  Surgeon: Chad Clifton MD;  Location:  OR    COLONOSCOPY      2014 at [facility]     CV CORONARY ANGIOGRAM N/A 2023    Procedure: Coronary Angiogram;  Surgeon: Pablo Araujo MD;  Location:  HEART CARDIAC CATH LAB    EYE SURGERY Bilateral     Cataract    H STATISTIC PICC LINE INSERTION >5YR, FAILED Left 2023    Unable to advance catheter over the axillary area    HERNIA REPAIR      History of bilateral inguinal hernia repair: 10/28/2014. Open hernia repair: 10/2017. Abdominal wound exploration and debridement 2017    INSERT SHUNT PORTAL TRANSJUGULAR INTRAHEPTIC  2022    IR CVC NON TUNNEL LINE REMOVAL  7/3/2023    IR CVC NON TUNNEL PLACEMENT > 5 YRS  2023    IR CVC TUNNEL PLACEMENT > 5 YRS OF AGE  2023    IR PICC PLACEMENT > 5 YRS OF AGE  2023    IR RENAL BIOPSY RIGHT  2023    RETURN LIVER TRANSPLANT N/A 2023    Procedure: Return liver transplant. Intra-operative ultrasound;  Surgeon: Chad Clifton MD;  Location: [facility] OR    TRANSPLANT KIDNEY RECIPIENT  DONOR N/A 2023    Procedure: Transplant kidney recipient  donor, ureteral stent placement;  Surgeon: Chad Clifton MD;  Location: [facility] OR    TRANSPLANT LIVER  RECIPIENT  DONOR N/A 2023    Procedure: Transplant liver recipient  donor;  Surgeon: Chad Clifton MD;  Location: [facility] OR       Medications   (Not in a hospital admission)       Review of Systems    The 10 point Review of Systems is negative other than noted in the HPI or here.     Social History   I have reviewed this patient's social history and updated it with pertinent information if needed.  Social History     Tobacco Use    Smoking status: Never     Passive exposure: Never    Smokeless tobacco: Never   Vaping Use    Vaping Use: Never used   Substance Use Topics    Alcohol use: Not Currently     Comment: Last ETOH use was 2021    Drug use: Not Currently         Family History   I have reviewed this patient's family history and updated it with pertinent information if needed.  Family History   Problem Relation Age of Onset    Lung Cancer Mother     Colon Cancer Father     Lung Cancer Brother     Heart Failure Brother     Kidney Disease Brother         Physical Exam   Vital Signs: Temp: 98.2  F (36.8  C) Temp src: Oral BP: 95/71 Pulse: 117   Resp: 24 SpO2: 99 % O2 Device: Nasal cannula Oxygen Delivery: 3 LPM  Weight: 0 lbs 0 oz    Constitutional: High BMI.  Mild distress.  Eyes: Lids and lashes normal, pupils equal, round and reactive to light, extra ocular muscles intact, sclera clear, conjunctiva normal  ENT: Normocephalic, without obvious abnormality, atraumatic.  Hematologic / Lymphatic: no supraclavicular lymphadenopathy  Respiratory: No increased work of breathing, good air exchange, clear to auscultation bilaterally, No crackles or wheezing  Cardiovascular: Normal apical impulse, regular rate and rhythm. No JVD. Mild leg swelling  GI: No scars, normal bowel sounds, soft, non-distended, non-tender.  Skin: warm to touch. No signs of excoriations in extremities*  Musculoskeletal: There is no redness, warmth, or swelling of the joints.  Full range of motion noted.  Neurologic:  Awake, alert, oriented to name, place and time.  Cranial nerves II-XII are grossly intact.         Medical Decision Making       Please see A&P for additional details of medical decision making.      Data   ------------------------- PAST 24 HR DATA REVIEWED -----------------------------------------------

## 2023-12-10 ENCOUNTER — APPOINTMENT (OUTPATIENT)
Dept: ULTRASOUND IMAGING | Facility: CLINIC | Age: 68
End: 2023-12-10
Payer: COMMERCIAL

## 2023-12-10 ENCOUNTER — APPOINTMENT (OUTPATIENT)
Dept: GENERAL RADIOLOGY | Facility: CLINIC | Age: 68
End: 2023-12-10
Attending: SURGERY
Payer: COMMERCIAL

## 2023-12-10 ENCOUNTER — ANESTHESIA EVENT (OUTPATIENT)
Dept: SURGERY | Facility: CLINIC | Age: 68
End: 2023-12-10
Payer: COMMERCIAL

## 2023-12-10 ENCOUNTER — ANESTHESIA (OUTPATIENT)
Dept: SURGERY | Facility: CLINIC | Age: 68
End: 2023-12-10
Payer: COMMERCIAL

## 2023-12-10 ENCOUNTER — APPOINTMENT (OUTPATIENT)
Dept: GENERAL RADIOLOGY | Facility: CLINIC | Age: 68
End: 2023-12-10
Attending: STUDENT IN AN ORGANIZED HEALTH CARE EDUCATION/TRAINING PROGRAM
Payer: COMMERCIAL

## 2023-12-10 LAB
ALBUMIN SERPL BCG-MCNC: 2.9 G/DL (ref 3.5–5.2)
ALBUMIN SERPL BCG-MCNC: 2.9 G/DL (ref 3.5–5.2)
ALBUMIN SERPL BCG-MCNC: 3.3 G/DL (ref 3.5–5.2)
ALLEN'S TEST: ABNORMAL
ALLEN'S TEST: NORMAL
ALLEN'S TEST: NORMAL
ALP SERPL-CCNC: 59 U/L (ref 40–150)
ALP SERPL-CCNC: 71 U/L (ref 40–150)
ALP SERPL-CCNC: 78 U/L (ref 40–150)
ALT SERPL W P-5'-P-CCNC: 7 U/L (ref 0–70)
ALT SERPL W P-5'-P-CCNC: 9 U/L (ref 0–70)
ALT SERPL W P-5'-P-CCNC: 9 U/L (ref 0–70)
ANION GAP SERPL CALCULATED.3IONS-SCNC: 13 MMOL/L (ref 7–15)
ANION GAP SERPL CALCULATED.3IONS-SCNC: 15 MMOL/L (ref 7–15)
ANION GAP SERPL CALCULATED.3IONS-SCNC: 19 MMOL/L (ref 7–15)
APTT PPP: 40 SECONDS (ref 22–38)
AST SERPL W P-5'-P-CCNC: 23 U/L (ref 0–45)
AST SERPL W P-5'-P-CCNC: 26 U/L (ref 0–45)
AST SERPL W P-5'-P-CCNC: 49 U/L (ref 0–45)
BASE EXCESS BLDA CALC-SCNC: -3.1 MMOL/L (ref -9–1.8)
BASE EXCESS BLDA CALC-SCNC: -4.4 MMOL/L (ref -9–1.8)
BASE EXCESS BLDA CALC-SCNC: -8.9 MMOL/L (ref -9–1.8)
BASOPHILS # BLD AUTO: ABNORMAL 10*3/UL
BASOPHILS # BLD MANUAL: 0 10E3/UL (ref 0–0.2)
BASOPHILS NFR BLD AUTO: ABNORMAL %
BASOPHILS NFR BLD MANUAL: 0 %
BILIRUB DIRECT SERPL-MCNC: 0.31 MG/DL (ref 0–0.3)
BILIRUB DIRECT SERPL-MCNC: 0.64 MG/DL (ref 0–0.3)
BILIRUB SERPL-MCNC: 1.1 MG/DL
BILIRUB SERPL-MCNC: 1.2 MG/DL
BILIRUB SERPL-MCNC: 1.5 MG/DL
BUN SERPL-MCNC: 30.2 MG/DL (ref 8–23)
BUN SERPL-MCNC: 30.7 MG/DL (ref 8–23)
BUN SERPL-MCNC: 31.8 MG/DL (ref 8–23)
BURR CELLS BLD QL SMEAR: SLIGHT
CA-I BLD-MCNC: 4.1 MG/DL (ref 4.4–5.2)
CALCIUM SERPL-MCNC: 7.4 MG/DL (ref 8.8–10.2)
CALCIUM SERPL-MCNC: 8.2 MG/DL (ref 8.8–10.2)
CALCIUM SERPL-MCNC: 8.5 MG/DL (ref 8.8–10.2)
CHLORIDE SERPL-SCNC: 104 MMOL/L (ref 98–107)
CHLORIDE SERPL-SCNC: 105 MMOL/L (ref 98–107)
CHLORIDE SERPL-SCNC: 105 MMOL/L (ref 98–107)
CREAT SERPL-MCNC: 1.71 MG/DL (ref 0.67–1.17)
CREAT SERPL-MCNC: 1.85 MG/DL (ref 0.67–1.17)
CREAT SERPL-MCNC: 1.86 MG/DL (ref 0.67–1.17)
DEPRECATED HCO3 PLAS-SCNC: 15 MMOL/L (ref 22–29)
DEPRECATED HCO3 PLAS-SCNC: 18 MMOL/L (ref 22–29)
DEPRECATED HCO3 PLAS-SCNC: 21 MMOL/L (ref 22–29)
EGFRCR SERPLBLD CKD-EPI 2021: 39 ML/MIN/1.73M2
EGFRCR SERPLBLD CKD-EPI 2021: 40 ML/MIN/1.73M2
EGFRCR SERPLBLD CKD-EPI 2021: 44 ML/MIN/1.73M2
EOSINOPHIL # BLD AUTO: ABNORMAL 10*3/UL
EOSINOPHIL # BLD MANUAL: 0 10E3/UL (ref 0–0.7)
EOSINOPHIL # BLD MANUAL: 0 10E3/UL (ref 0–0.7)
EOSINOPHIL # BLD MANUAL: 0.1 10E3/UL (ref 0–0.7)
EOSINOPHIL NFR BLD AUTO: ABNORMAL %
EOSINOPHIL NFR BLD MANUAL: 0 %
EOSINOPHIL NFR BLD MANUAL: 0 %
EOSINOPHIL NFR BLD MANUAL: 1 %
ERYTHROCYTE [DISTWIDTH] IN BLOOD BY AUTOMATED COUNT: 17.5 % (ref 10–15)
ERYTHROCYTE [DISTWIDTH] IN BLOOD BY AUTOMATED COUNT: 17.6 % (ref 10–15)
ERYTHROCYTE [DISTWIDTH] IN BLOOD BY AUTOMATED COUNT: 17.9 % (ref 10–15)
FIBRINOGEN PPP-MCNC: 551 MG/DL (ref 170–490)
GLUCOSE BLDC GLUCOMTR-MCNC: 131 MG/DL (ref 70–99)
GLUCOSE BLDC GLUCOMTR-MCNC: 168 MG/DL (ref 70–99)
GLUCOSE SERPL-MCNC: 113 MG/DL (ref 70–99)
GLUCOSE SERPL-MCNC: 131 MG/DL (ref 70–99)
GLUCOSE SERPL-MCNC: 94 MG/DL (ref 70–99)
GRAM STAIN RESULT: ABNORMAL
GRAM STAIN RESULT: ABNORMAL
GRAM STAIN RESULT: NORMAL
GRAM STAIN RESULT: NORMAL
HCO3 BLD-SCNC: 21 MMOL/L (ref 21–28)
HCO3 BLD-SCNC: 21 MMOL/L (ref 21–28)
HCO3 BLD-SCNC: 22 MMOL/L (ref 21–28)
HCT VFR BLD AUTO: 39 % (ref 40–53)
HCT VFR BLD AUTO: 43.1 % (ref 40–53)
HCT VFR BLD AUTO: 49.3 % (ref 40–53)
HGB BLD-MCNC: 12.1 G/DL (ref 13.3–17.7)
HGB BLD-MCNC: 13.3 G/DL (ref 13.3–17.7)
HGB BLD-MCNC: 15.4 G/DL (ref 13.3–17.7)
HOLD SPECIMEN: NORMAL
IMM GRANULOCYTES # BLD: ABNORMAL 10*3/UL
IMM GRANULOCYTES NFR BLD: ABNORMAL %
INR PPP: 2.29 (ref 0.85–1.15)
LACTATE SERPL-SCNC: 1 MMOL/L (ref 0.7–2)
LACTATE SERPL-SCNC: 1.5 MMOL/L (ref 0.7–2)
LACTATE SERPL-SCNC: 2.1 MMOL/L (ref 0.7–2)
LACTATE SERPL-SCNC: 2.4 MMOL/L (ref 0.7–2)
LYMPHOCYTES # BLD AUTO: ABNORMAL 10*3/UL
LYMPHOCYTES # BLD MANUAL: 0.2 10E3/UL (ref 0.8–5.3)
LYMPHOCYTES # BLD MANUAL: 0.7 10E3/UL (ref 0.8–5.3)
LYMPHOCYTES # BLD MANUAL: 1.1 10E3/UL (ref 0.8–5.3)
LYMPHOCYTES NFR BLD AUTO: ABNORMAL %
LYMPHOCYTES NFR BLD MANUAL: 13 %
LYMPHOCYTES NFR BLD MANUAL: 17 %
LYMPHOCYTES NFR BLD MANUAL: 3 %
MAGNESIUM SERPL-MCNC: 1.8 MG/DL (ref 1.7–2.3)
MAGNESIUM SERPL-MCNC: 1.8 MG/DL (ref 1.7–2.3)
MAGNESIUM SERPL-MCNC: 1.9 MG/DL (ref 1.7–2.3)
MCH RBC QN AUTO: 28.7 PG (ref 26.5–33)
MCH RBC QN AUTO: 28.9 PG (ref 26.5–33)
MCH RBC QN AUTO: 29 PG (ref 26.5–33)
MCHC RBC AUTO-ENTMCNC: 30.9 G/DL (ref 31.5–36.5)
MCHC RBC AUTO-ENTMCNC: 31 G/DL (ref 31.5–36.5)
MCHC RBC AUTO-ENTMCNC: 31.2 G/DL (ref 31.5–36.5)
MCV RBC AUTO: 93 FL (ref 78–100)
MONOCYTES # BLD AUTO: ABNORMAL 10*3/UL
MONOCYTES # BLD MANUAL: 0.2 10E3/UL (ref 0–1.3)
MONOCYTES # BLD MANUAL: 0.3 10E3/UL (ref 0–1.3)
MONOCYTES # BLD MANUAL: 0.5 10E3/UL (ref 0–1.3)
MONOCYTES NFR BLD AUTO: ABNORMAL %
MONOCYTES NFR BLD MANUAL: 3 %
MONOCYTES NFR BLD MANUAL: 6 %
MONOCYTES NFR BLD MANUAL: 9 %
NEUTROPHILS # BLD AUTO: ABNORMAL 10*3/UL
NEUTROPHILS # BLD MANUAL: 4.6 10E3/UL (ref 1.6–8.3)
NEUTROPHILS # BLD MANUAL: 4.9 10E3/UL (ref 1.6–8.3)
NEUTROPHILS # BLD MANUAL: 5.2 10E3/UL (ref 1.6–8.3)
NEUTROPHILS NFR BLD AUTO: ABNORMAL %
NEUTROPHILS NFR BLD MANUAL: 79 %
NEUTROPHILS NFR BLD MANUAL: 81 %
NEUTROPHILS NFR BLD MANUAL: 88 %
NRBC # BLD AUTO: 0 10E3/UL
NRBC BLD AUTO-RTO: 0 /100
O2/TOTAL GAS SETTING VFR VENT: 40 %
O2/TOTAL GAS SETTING VFR VENT: 50 %
O2/TOTAL GAS SETTING VFR VENT: 50 %
OXYHGB MFR BLD: 92 % (ref 92–100)
OXYHGB MFR BLD: 96 % (ref 92–100)
PCO2 BLD: 38 MM HG (ref 35–45)
PCO2 BLD: 39 MM HG (ref 35–45)
PCO2 BLD: 65 MM HG (ref 35–45)
PH BLD: 7.12 [PH] (ref 7.35–7.45)
PH BLD: 7.35 [PH] (ref 7.35–7.45)
PH BLD: 7.36 [PH] (ref 7.35–7.45)
PHOSPHATE SERPL-MCNC: 3.3 MG/DL (ref 2.5–4.5)
PHOSPHATE SERPL-MCNC: 3.5 MG/DL (ref 2.5–4.5)
PHOSPHATE SERPL-MCNC: 3.9 MG/DL (ref 2.5–4.5)
PLAT MORPH BLD: ABNORMAL
PLAT MORPH BLD: ABNORMAL
PLAT MORPH BLD: NORMAL
PLATELET # BLD AUTO: 128 10E3/UL (ref 150–450)
PLATELET # BLD AUTO: 174 10E3/UL (ref 150–450)
PLATELET # BLD AUTO: 192 10E3/UL (ref 150–450)
PO2 BLD: 87 MM HG (ref 80–105)
PO2 BLD: 88 MM HG (ref 80–105)
PO2 BLD: 90 MM HG (ref 80–105)
POTASSIUM SERPL-SCNC: 4.2 MMOL/L (ref 3.4–5.3)
POTASSIUM SERPL-SCNC: 4.3 MMOL/L (ref 3.4–5.3)
POTASSIUM SERPL-SCNC: 4.9 MMOL/L (ref 3.4–5.3)
PROT SERPL-MCNC: 4.3 G/DL (ref 6.4–8.3)
PROT SERPL-MCNC: 4.9 G/DL (ref 6.4–8.3)
PROT SERPL-MCNC: 5 G/DL (ref 6.4–8.3)
RBC # BLD AUTO: 4.19 10E6/UL (ref 4.4–5.9)
RBC # BLD AUTO: 4.63 10E6/UL (ref 4.4–5.9)
RBC # BLD AUTO: 5.31 10E6/UL (ref 4.4–5.9)
RBC MORPH BLD: ABNORMAL
RBC MORPH BLD: ABNORMAL
RBC MORPH BLD: NORMAL
SODIUM SERPL-SCNC: 138 MMOL/L (ref 135–145)
SODIUM SERPL-SCNC: 138 MMOL/L (ref 135–145)
SODIUM SERPL-SCNC: 139 MMOL/L (ref 135–145)
TACROLIMUS BLD-MCNC: 9.8 UG/L (ref 5–15)
TME LAST DOSE: NORMAL H
TME LAST DOSE: NORMAL H
UFH PPP CHRO-ACNC: <0.1 IU/ML
UFH PPP CHRO-ACNC: <0.1 IU/ML
WBC # BLD AUTO: 5.7 10E3/UL (ref 4–11)
WBC # BLD AUTO: 5.9 10E3/UL (ref 4–11)
WBC # BLD AUTO: 6.2 10E3/UL (ref 4–11)

## 2023-12-10 PROCEDURE — 258N000003 HC RX IP 258 OP 636

## 2023-12-10 PROCEDURE — 250N000009 HC RX 250: Performed by: ANESTHESIOLOGY

## 2023-12-10 PROCEDURE — 87106 FUNGI IDENTIFICATION YEAST: CPT | Performed by: TRANSPLANT SURGERY

## 2023-12-10 PROCEDURE — 36415 COLL VENOUS BLD VENIPUNCTURE: CPT | Performed by: NURSE PRACTITIONER

## 2023-12-10 PROCEDURE — 85730 THROMBOPLASTIN TIME PARTIAL: CPT | Performed by: STUDENT IN AN ORGANIZED HEALTH CARE EDUCATION/TRAINING PROGRAM

## 2023-12-10 PROCEDURE — 272N000001 HC OR GENERAL SUPPLY STERILE: Performed by: TRANSPLANT SURGERY

## 2023-12-10 PROCEDURE — 83735 ASSAY OF MAGNESIUM: CPT | Performed by: STUDENT IN AN ORGANIZED HEALTH CARE EDUCATION/TRAINING PROGRAM

## 2023-12-10 PROCEDURE — 99207 PR NO CHARGE LOS: CPT | Performed by: TRANSPLANT SURGERY

## 2023-12-10 PROCEDURE — 250N000011 HC RX IP 250 OP 636: Performed by: SURGERY

## 2023-12-10 PROCEDURE — 83605 ASSAY OF LACTIC ACID: CPT | Performed by: STUDENT IN AN ORGANIZED HEALTH CARE EDUCATION/TRAINING PROGRAM

## 2023-12-10 PROCEDURE — 80048 BASIC METABOLIC PNL TOTAL CA: CPT | Performed by: NURSE PRACTITIONER

## 2023-12-10 PROCEDURE — 87205 SMEAR GRAM STAIN: CPT | Performed by: TRANSPLANT SURGERY

## 2023-12-10 PROCEDURE — 83735 ASSAY OF MAGNESIUM: CPT | Performed by: NURSE PRACTITIONER

## 2023-12-10 PROCEDURE — 84100 ASSAY OF PHOSPHORUS: CPT | Performed by: SURGERY

## 2023-12-10 PROCEDURE — 85027 COMPLETE CBC AUTOMATED: CPT | Performed by: SURGERY

## 2023-12-10 PROCEDURE — 250N000011 HC RX IP 250 OP 636: Mod: JZ | Performed by: PHYSICIAN ASSISTANT

## 2023-12-10 PROCEDURE — 84100 ASSAY OF PHOSPHORUS: CPT | Performed by: NURSE PRACTITIONER

## 2023-12-10 PROCEDURE — 76776 US EXAM K TRANSPL W/DOPPLER: CPT

## 2023-12-10 PROCEDURE — 99222 1ST HOSP IP/OBS MODERATE 55: CPT | Mod: GC | Performed by: STUDENT IN AN ORGANIZED HEALTH CARE EDUCATION/TRAINING PROGRAM

## 2023-12-10 PROCEDURE — 250N000012 HC RX MED GY IP 250 OP 636 PS 637: Performed by: PHYSICIAN ASSISTANT

## 2023-12-10 PROCEDURE — 99207 US RENAL TRANSPLANT WITH DOPPLER: CPT | Mod: 26 | Performed by: RADIOLOGY

## 2023-12-10 PROCEDURE — 82803 BLOOD GASES ANY COMBINATION: CPT

## 2023-12-10 PROCEDURE — 85520 HEPARIN ASSAY: CPT | Performed by: SURGERY

## 2023-12-10 PROCEDURE — 82805 BLOOD GASES W/O2 SATURATION: CPT | Performed by: STUDENT IN AN ORGANIZED HEALTH CARE EDUCATION/TRAINING PROGRAM

## 2023-12-10 PROCEDURE — 83605 ASSAY OF LACTIC ACID: CPT | Performed by: PHYSICIAN ASSISTANT

## 2023-12-10 PROCEDURE — 250N000011 HC RX IP 250 OP 636: Performed by: STUDENT IN AN ORGANIZED HEALTH CARE EDUCATION/TRAINING PROGRAM

## 2023-12-10 PROCEDURE — 85384 FIBRINOGEN ACTIVITY: CPT | Performed by: STUDENT IN AN ORGANIZED HEALTH CARE EDUCATION/TRAINING PROGRAM

## 2023-12-10 PROCEDURE — 250N000013 HC RX MED GY IP 250 OP 250 PS 637

## 2023-12-10 PROCEDURE — 80197 ASSAY OF TACROLIMUS: CPT | Performed by: PHYSICIAN ASSISTANT

## 2023-12-10 PROCEDURE — 250N000011 HC RX IP 250 OP 636: Mod: JZ

## 2023-12-10 PROCEDURE — 258N000003 HC RX IP 258 OP 636: Performed by: STUDENT IN AN ORGANIZED HEALTH CARE EDUCATION/TRAINING PROGRAM

## 2023-12-10 PROCEDURE — 71045 X-RAY EXAM CHEST 1 VIEW: CPT

## 2023-12-10 PROCEDURE — 250N000009 HC RX 250

## 2023-12-10 PROCEDURE — 370N000017 HC ANESTHESIA TECHNICAL FEE, PER MIN: Performed by: TRANSPLANT SURGERY

## 2023-12-10 PROCEDURE — 84100 ASSAY OF PHOSPHORUS: CPT | Performed by: STUDENT IN AN ORGANIZED HEALTH CARE EDUCATION/TRAINING PROGRAM

## 2023-12-10 PROCEDURE — 999N000157 HC STATISTIC RCP TIME EA 10 MIN

## 2023-12-10 PROCEDURE — 83735 ASSAY OF MAGNESIUM: CPT | Performed by: SURGERY

## 2023-12-10 PROCEDURE — 999N000065 XR ABDOMEN PORT 1 VIEW

## 2023-12-10 PROCEDURE — 250N000012 HC RX MED GY IP 250 OP 636 PS 637: Performed by: NURSE PRACTITIONER

## 2023-12-10 PROCEDURE — 250N000025 HC SEVOFLURANE, PER MIN: Performed by: TRANSPLANT SURGERY

## 2023-12-10 PROCEDURE — 44602 SUTURE SMALL INTESTINE: CPT | Mod: 78 | Performed by: TRANSPLANT SURGERY

## 2023-12-10 PROCEDURE — 94002 VENT MGMT INPAT INIT DAY: CPT

## 2023-12-10 PROCEDURE — 85027 COMPLETE CBC AUTOMATED: CPT | Performed by: STUDENT IN AN ORGANIZED HEALTH CARE EDUCATION/TRAINING PROGRAM

## 2023-12-10 PROCEDURE — 93975 VASCULAR STUDY: CPT

## 2023-12-10 PROCEDURE — 360N000076 HC SURGERY LEVEL 3, PER MIN: Performed by: TRANSPLANT SURGERY

## 2023-12-10 PROCEDURE — 85007 BL SMEAR W/DIFF WBC COUNT: CPT | Performed by: STUDENT IN AN ORGANIZED HEALTH CARE EDUCATION/TRAINING PROGRAM

## 2023-12-10 PROCEDURE — 82248 BILIRUBIN DIRECT: CPT | Performed by: NURSE PRACTITIONER

## 2023-12-10 PROCEDURE — 0DQ80ZZ REPAIR SMALL INTESTINE, OPEN APPROACH: ICD-10-PCS | Performed by: TRANSPLANT SURGERY

## 2023-12-10 PROCEDURE — 85610 PROTHROMBIN TIME: CPT | Performed by: STUDENT IN AN ORGANIZED HEALTH CARE EDUCATION/TRAINING PROGRAM

## 2023-12-10 PROCEDURE — 85007 BL SMEAR W/DIFF WBC COUNT: CPT | Performed by: SURGERY

## 2023-12-10 PROCEDURE — 87075 CULTR BACTERIA EXCEPT BLOOD: CPT | Performed by: TRANSPLANT SURGERY

## 2023-12-10 PROCEDURE — 250N000009 HC RX 250: Performed by: SURGERY

## 2023-12-10 PROCEDURE — 82330 ASSAY OF CALCIUM: CPT | Performed by: STUDENT IN AN ORGANIZED HEALTH CARE EDUCATION/TRAINING PROGRAM

## 2023-12-10 PROCEDURE — 93975 VASCULAR STUDY: CPT | Mod: 26 | Performed by: RADIOLOGY

## 2023-12-10 PROCEDURE — 82805 BLOOD GASES W/O2 SATURATION: CPT

## 2023-12-10 PROCEDURE — 250N000011 HC RX IP 250 OP 636

## 2023-12-10 PROCEDURE — 94660 CPAP INITIATION&MGMT: CPT

## 2023-12-10 PROCEDURE — 82248 BILIRUBIN DIRECT: CPT | Performed by: SURGERY

## 2023-12-10 PROCEDURE — 250N000013 HC RX MED GY IP 250 OP 250 PS 637: Performed by: NURSE PRACTITIONER

## 2023-12-10 PROCEDURE — 99233 SBSQ HOSP IP/OBS HIGH 50: CPT | Mod: 24 | Performed by: INTERNAL MEDICINE

## 2023-12-10 PROCEDURE — 36415 COLL VENOUS BLD VENIPUNCTURE: CPT | Performed by: SURGERY

## 2023-12-10 PROCEDURE — 74018 RADEX ABDOMEN 1 VIEW: CPT | Mod: 26 | Performed by: RADIOLOGY

## 2023-12-10 PROCEDURE — 258N000003 HC RX IP 258 OP 636: Performed by: ANESTHESIOLOGY

## 2023-12-10 PROCEDURE — 71045 X-RAY EXAM CHEST 1 VIEW: CPT | Mod: 26 | Performed by: RADIOLOGY

## 2023-12-10 PROCEDURE — P9045 ALBUMIN (HUMAN), 5%, 250 ML: HCPCS | Mod: JZ | Performed by: ANESTHESIOLOGY

## 2023-12-10 PROCEDURE — 200N000002 HC R&B ICU UMMC

## 2023-12-10 PROCEDURE — 250N000011 HC RX IP 250 OP 636: Mod: JZ | Performed by: ANESTHESIOLOGY

## 2023-12-10 PROCEDURE — 80048 BASIC METABOLIC PNL TOTAL CA: CPT | Performed by: SURGERY

## 2023-12-10 PROCEDURE — 0W9G0ZZ DRAINAGE OF PERITONEAL CAVITY, OPEN APPROACH: ICD-10-PCS | Performed by: TRANSPLANT SURGERY

## 2023-12-10 PROCEDURE — 999N000248 HC STATISTIC IV INSERT WITH US BY RN

## 2023-12-10 RX ORDER — FLUCONAZOLE 2 MG/ML
200 INJECTION, SOLUTION INTRAVENOUS EVERY 24 HOURS
Status: DISCONTINUED | OUTPATIENT
Start: 2023-12-10 | End: 2023-12-12

## 2023-12-10 RX ORDER — FENTANYL CITRATE 50 UG/ML
INJECTION, SOLUTION INTRAMUSCULAR; INTRAVENOUS PRN
Status: DISCONTINUED | OUTPATIENT
Start: 2023-12-10 | End: 2023-12-10

## 2023-12-10 RX ORDER — ALLOPURINOL 300 MG/1
300 TABLET ORAL DAILY
Status: DISCONTINUED | OUTPATIENT
Start: 2023-12-11 | End: 2023-12-20 | Stop reason: HOSPADM

## 2023-12-10 RX ORDER — METHYLPREDNISOLONE SODIUM SUCCINATE 40 MG/ML
4 INJECTION, POWDER, LYOPHILIZED, FOR SOLUTION INTRAMUSCULAR; INTRAVENOUS DAILY
Status: DISCONTINUED | OUTPATIENT
Start: 2023-12-11 | End: 2023-12-16

## 2023-12-10 RX ORDER — PREDNISONE 5 MG/1
5 TABLET ORAL DAILY
Status: DISCONTINUED | OUTPATIENT
Start: 2023-12-11 | End: 2023-12-20 | Stop reason: HOSPADM

## 2023-12-10 RX ORDER — ACETAMINOPHEN 325 MG/1
650 TABLET ORAL EVERY 4 HOURS PRN
Status: DISCONTINUED | OUTPATIENT
Start: 2023-12-10 | End: 2023-12-20 | Stop reason: HOSPADM

## 2023-12-10 RX ORDER — ESMOLOL HYDROCHLORIDE 10 MG/ML
INJECTION INTRAVENOUS PRN
Status: DISCONTINUED | OUTPATIENT
Start: 2023-12-10 | End: 2023-12-10

## 2023-12-10 RX ORDER — DEXTROSE MONOHYDRATE, SODIUM CHLORIDE, AND POTASSIUM CHLORIDE 50; 1.49; 4.5 G/1000ML; G/1000ML; G/1000ML
INJECTION, SOLUTION INTRAVENOUS CONTINUOUS
Status: DISCONTINUED | OUTPATIENT
Start: 2023-12-10 | End: 2023-12-10

## 2023-12-10 RX ORDER — SODIUM CHLORIDE, SODIUM GLUCONATE, SODIUM ACETATE, POTASSIUM CHLORIDE AND MAGNESIUM CHLORIDE 526; 502; 368; 37; 30 MG/100ML; MG/100ML; MG/100ML; MG/100ML; MG/100ML
INJECTION, SOLUTION INTRAVENOUS CONTINUOUS PRN
Status: DISCONTINUED | OUTPATIENT
Start: 2023-12-10 | End: 2023-12-10

## 2023-12-10 RX ORDER — BISACODYL 10 MG
10 SUPPOSITORY, RECTAL RECTAL DAILY
Status: DISCONTINUED | OUTPATIENT
Start: 2023-12-10 | End: 2023-12-10

## 2023-12-10 RX ORDER — BISACODYL 10 MG
10 SUPPOSITORY, RECTAL RECTAL DAILY
Status: DISCONTINUED | OUTPATIENT
Start: 2023-12-11 | End: 2023-12-12

## 2023-12-10 RX ORDER — ONDANSETRON 2 MG/ML
INJECTION INTRAMUSCULAR; INTRAVENOUS PRN
Status: DISCONTINUED | OUTPATIENT
Start: 2023-12-10 | End: 2023-12-10

## 2023-12-10 RX ORDER — HYDROMORPHONE HCL IN WATER/PF 6 MG/30 ML
0.2 PATIENT CONTROLLED ANALGESIA SYRINGE INTRAVENOUS
Status: DISCONTINUED | OUTPATIENT
Start: 2023-12-10 | End: 2023-12-18

## 2023-12-10 RX ORDER — NALOXONE HYDROCHLORIDE 0.4 MG/ML
0.2 INJECTION, SOLUTION INTRAMUSCULAR; INTRAVENOUS; SUBCUTANEOUS
Status: DISCONTINUED | OUTPATIENT
Start: 2023-12-10 | End: 2023-12-20 | Stop reason: HOSPADM

## 2023-12-10 RX ORDER — GABAPENTIN 100 MG/1
100 CAPSULE ORAL 2 TIMES DAILY
Status: DISCONTINUED | OUTPATIENT
Start: 2023-12-10 | End: 2023-12-20 | Stop reason: HOSPADM

## 2023-12-10 RX ORDER — AMOXICILLIN 250 MG
2 CAPSULE ORAL 2 TIMES DAILY
Status: DISCONTINUED | OUTPATIENT
Start: 2023-12-10 | End: 2023-12-16

## 2023-12-10 RX ORDER — NALOXONE HYDROCHLORIDE 0.4 MG/ML
0.4 INJECTION, SOLUTION INTRAMUSCULAR; INTRAVENOUS; SUBCUTANEOUS
Status: DISCONTINUED | OUTPATIENT
Start: 2023-12-10 | End: 2023-12-20 | Stop reason: HOSPADM

## 2023-12-10 RX ORDER — LIDOCAINE HYDROCHLORIDE 20 MG/ML
INJECTION, SOLUTION INFILTRATION; PERINEURAL PRN
Status: DISCONTINUED | OUTPATIENT
Start: 2023-12-10 | End: 2023-12-10

## 2023-12-10 RX ORDER — SODIUM CHLORIDE, SODIUM LACTATE, POTASSIUM CHLORIDE, CALCIUM CHLORIDE 600; 310; 30; 20 MG/100ML; MG/100ML; MG/100ML; MG/100ML
INJECTION, SOLUTION INTRAVENOUS CONTINUOUS
Status: DISCONTINUED | OUTPATIENT
Start: 2023-12-10 | End: 2023-12-12

## 2023-12-10 RX ORDER — NOREPINEPHRINE BITARTRATE 0.02 MG/ML
INJECTION, SOLUTION INTRAVENOUS CONTINUOUS PRN
Status: DISCONTINUED | OUTPATIENT
Start: 2023-12-10 | End: 2023-12-10

## 2023-12-10 RX ORDER — DEXTROSE MONOHYDRATE 100 MG/ML
INJECTION, SOLUTION INTRAVENOUS CONTINUOUS PRN
Status: DISCONTINUED | OUTPATIENT
Start: 2023-12-10 | End: 2023-12-20 | Stop reason: HOSPADM

## 2023-12-10 RX ORDER — SULFAMETHOXAZOLE AND TRIMETHOPRIM 400; 80 MG/1; MG/1
1 TABLET ORAL DAILY
Status: DISCONTINUED | OUTPATIENT
Start: 2023-12-11 | End: 2023-12-20 | Stop reason: HOSPADM

## 2023-12-10 RX ORDER — OXYCODONE HYDROCHLORIDE 5 MG/1
5 TABLET ORAL EVERY 4 HOURS PRN
Status: DISCONTINUED | OUTPATIENT
Start: 2023-12-10 | End: 2023-12-14

## 2023-12-10 RX ORDER — DEXMEDETOMIDINE HYDROCHLORIDE 4 UG/ML
.1-.5 INJECTION, SOLUTION INTRAVENOUS CONTINUOUS
Status: DISCONTINUED | OUTPATIENT
Start: 2023-12-11 | End: 2023-12-11

## 2023-12-10 RX ORDER — LEVOTHYROXINE SODIUM 88 UG/1
88 TABLET ORAL DAILY
Status: DISCONTINUED | OUTPATIENT
Start: 2023-12-11 | End: 2023-12-20 | Stop reason: HOSPADM

## 2023-12-10 RX ORDER — LEVOTHYROXINE SODIUM ANHYDROUS 100 UG/5ML
70 INJECTION, POWDER, LYOPHILIZED, FOR SOLUTION INTRAVENOUS DAILY
Status: DISCONTINUED | OUTPATIENT
Start: 2023-12-11 | End: 2023-12-16

## 2023-12-10 RX ORDER — ATORVASTATIN CALCIUM 40 MG/1
40 TABLET, FILM COATED ORAL EVERY EVENING
Status: DISCONTINUED | OUTPATIENT
Start: 2023-12-10 | End: 2023-12-20 | Stop reason: HOSPADM

## 2023-12-10 RX ORDER — SODIUM CHLORIDE 9 MG/ML
INJECTION, SOLUTION INTRAVENOUS CONTINUOUS
Status: DISCONTINUED | OUTPATIENT
Start: 2023-12-10 | End: 2023-12-10

## 2023-12-10 RX ORDER — PROPOFOL 10 MG/ML
INJECTION, EMULSION INTRAVENOUS PRN
Status: DISCONTINUED | OUTPATIENT
Start: 2023-12-10 | End: 2023-12-10

## 2023-12-10 RX ORDER — HEPARIN SODIUM 10000 [USP'U]/100ML
0-5000 INJECTION, SOLUTION INTRAVENOUS CONTINUOUS
Status: DISCONTINUED | OUTPATIENT
Start: 2023-12-10 | End: 2023-12-15

## 2023-12-10 RX ORDER — DEXAMETHASONE SODIUM PHOSPHATE 4 MG/ML
INJECTION, SOLUTION INTRA-ARTICULAR; INTRALESIONAL; INTRAMUSCULAR; INTRAVENOUS; SOFT TISSUE PRN
Status: DISCONTINUED | OUTPATIENT
Start: 2023-12-10 | End: 2023-12-10

## 2023-12-10 RX ORDER — POTASSIUM CHLORIDE 7.45 MG/ML
10 INJECTION INTRAVENOUS ONCE
Status: COMPLETED | OUTPATIENT
Start: 2023-12-10 | End: 2023-12-10

## 2023-12-10 RX ORDER — GABAPENTIN 100 MG/1
200 CAPSULE ORAL DAILY
Status: DISCONTINUED | OUTPATIENT
Start: 2023-12-10 | End: 2023-12-16

## 2023-12-10 RX ORDER — LIDOCAINE 40 MG/G
CREAM TOPICAL
Status: ACTIVE | OUTPATIENT
Start: 2023-12-10 | End: 2023-12-13

## 2023-12-10 RX ORDER — DIGOXIN 0.25 MG/ML
250 INJECTION INTRAMUSCULAR; INTRAVENOUS ONCE
Status: COMPLETED | OUTPATIENT
Start: 2023-12-10 | End: 2023-12-10

## 2023-12-10 RX ADMIN — PIPERACILLIN AND TAZOBACTAM 3.38 G: 3; .375 INJECTION, POWDER, LYOPHILIZED, FOR SOLUTION INTRAVENOUS at 19:47

## 2023-12-10 RX ADMIN — PIPERACILLIN AND TAZOBACTAM 3.38 G: 3; .375 INJECTION, POWDER, LYOPHILIZED, FOR SOLUTION INTRAVENOUS at 16:08

## 2023-12-10 RX ADMIN — PHENYLEPHRINE HYDROCHLORIDE 200 MCG: 10 INJECTION INTRAVENOUS at 09:26

## 2023-12-10 RX ADMIN — NOREPINEPHRINE BITARTRATE 6.4 MCG: 1 INJECTION, SOLUTION, CONCENTRATE INTRAVENOUS at 09:18

## 2023-12-10 RX ADMIN — Medication 100 MG: at 09:13

## 2023-12-10 RX ADMIN — PROPOFOL 200 MG: 10 INJECTION, EMULSION INTRAVENOUS at 09:18

## 2023-12-10 RX ADMIN — ALBUMIN (HUMAN): 12.5 SOLUTION INTRAVENOUS at 09:54

## 2023-12-10 RX ADMIN — Medication 0.03 MCG/KG/MIN: at 09:22

## 2023-12-10 RX ADMIN — ALLOPURINOL 300 MG: 300 TABLET ORAL at 08:27

## 2023-12-10 RX ADMIN — ONDANSETRON 4 MG: 2 INJECTION INTRAMUSCULAR; INTRAVENOUS at 12:23

## 2023-12-10 RX ADMIN — ESMOLOL HYDROCHLORIDE 100 MG: 10 INJECTION, SOLUTION INTRAVENOUS at 09:13

## 2023-12-10 RX ADMIN — Medication 30 MG: at 10:31

## 2023-12-10 RX ADMIN — PREDNISONE 5 MG: 5 TABLET ORAL at 08:27

## 2023-12-10 RX ADMIN — FENTANYL CITRATE 100 MCG: 50 INJECTION, SOLUTION INTRAMUSCULAR; INTRAVENOUS at 01:01

## 2023-12-10 RX ADMIN — TACROLIMUS 0.5 MG: 0.5 CAPSULE ORAL at 18:39

## 2023-12-10 RX ADMIN — PIPERACILLIN AND TAZOBACTAM 3.38 G: 3; .375 INJECTION, POWDER, LYOPHILIZED, FOR SOLUTION INTRAVENOUS at 08:27

## 2023-12-10 RX ADMIN — SODIUM CHLORIDE, SODIUM GLUCONATE, SODIUM ACETATE, POTASSIUM CHLORIDE AND MAGNESIUM CHLORIDE: 526; 502; 368; 37; 30 INJECTION, SOLUTION INTRAVENOUS at 10:23

## 2023-12-10 RX ADMIN — GABAPENTIN 100 MG: 100 CAPSULE ORAL at 08:27

## 2023-12-10 RX ADMIN — DIGOXIN 250 MCG: 0.25 INJECTION INTRAMUSCULAR; INTRAVENOUS at 01:22

## 2023-12-10 RX ADMIN — PROPOFOL 180 MG: 10 INJECTION, EMULSION INTRAVENOUS at 09:13

## 2023-12-10 RX ADMIN — LIDOCAINE HYDROCHLORIDE 100 MG: 20 INJECTION, SOLUTION INFILTRATION; PERINEURAL at 09:13

## 2023-12-10 RX ADMIN — POTASSIUM CHLORIDE, DEXTROSE MONOHYDRATE AND SODIUM CHLORIDE: 150; 5; 450 INJECTION, SOLUTION INTRAVENOUS at 14:09

## 2023-12-10 RX ADMIN — POLYETHYLENE GLYCOL 400 AND PROPYLENE GLYCOL 1 DROP: 4; 3 SOLUTION/ DROPS OPHTHALMIC at 23:48

## 2023-12-10 RX ADMIN — SODIUM CHLORIDE, SODIUM GLUCONATE, SODIUM ACETATE, POTASSIUM CHLORIDE AND MAGNESIUM CHLORIDE: 526; 502; 368; 37; 30 INJECTION, SOLUTION INTRAVENOUS at 09:13

## 2023-12-10 RX ADMIN — VANCOMYCIN HYDROCHLORIDE 1500 MG: 10 INJECTION, POWDER, LYOPHILIZED, FOR SOLUTION INTRAVENOUS at 05:54

## 2023-12-10 RX ADMIN — MYCOPHENOLATE MOFETIL 750 MG: 200 POWDER, FOR SUSPENSION ORAL at 08:37

## 2023-12-10 RX ADMIN — PIPERACILLIN AND TAZOBACTAM 3.38 G: 3; .375 INJECTION, POWDER, LYOPHILIZED, FOR SOLUTION INTRAVENOUS at 01:12

## 2023-12-10 RX ADMIN — ALBUMIN (HUMAN): 12.5 SOLUTION INTRAVENOUS at 10:30

## 2023-12-10 RX ADMIN — DEXAMETHASONE SODIUM PHOSPHATE 4 MG: 4 INJECTION, SOLUTION INTRA-ARTICULAR; INTRALESIONAL; INTRAMUSCULAR; INTRAVENOUS; SOFT TISSUE at 09:14

## 2023-12-10 RX ADMIN — TACROLIMUS 0.5 MG: 0.5 CAPSULE ORAL at 08:28

## 2023-12-10 RX ADMIN — PANTOPRAZOLE SODIUM 40 MG: 40 TABLET, DELAYED RELEASE ORAL at 08:27

## 2023-12-10 RX ADMIN — MAGNESIUM SULFATE HEPTAHYDRATE: 500 INJECTION, SOLUTION INTRAMUSCULAR; INTRAVENOUS at 19:54

## 2023-12-10 RX ADMIN — SULFAMETHOXAZOLE AND TRIMETHOPRIM 1 TABLET: 400; 80 TABLET ORAL at 08:27

## 2023-12-10 RX ADMIN — SODIUM CHLORIDE, POTASSIUM CHLORIDE, SODIUM LACTATE AND CALCIUM CHLORIDE: 600; 310; 30; 20 INJECTION, SOLUTION INTRAVENOUS at 19:58

## 2023-12-10 RX ADMIN — SODIUM CHLORIDE: 9 INJECTION, SOLUTION INTRAVENOUS at 18:21

## 2023-12-10 RX ADMIN — POTASSIUM CHLORIDE 10 MEQ: 7.46 INJECTION, SOLUTION INTRAVENOUS at 08:27

## 2023-12-10 RX ADMIN — DEXMEDETOMIDINE HYDROCHLORIDE 0.2 MCG/KG/HR: 400 INJECTION INTRAVENOUS at 23:48

## 2023-12-10 RX ADMIN — FENTANYL CITRATE 100 MCG: 50 INJECTION INTRAMUSCULAR; INTRAVENOUS at 09:18

## 2023-12-10 RX ADMIN — SUGAMMADEX 200 MG: 100 INJECTION, SOLUTION INTRAVENOUS at 12:53

## 2023-12-10 RX ADMIN — LEVOTHYROXINE SODIUM 88 MCG: 88 TABLET ORAL at 08:37

## 2023-12-10 RX ADMIN — Medication 100 MG: at 09:18

## 2023-12-10 RX ADMIN — MIDAZOLAM 2 MG: 1 INJECTION INTRAMUSCULAR; INTRAVENOUS at 01:02

## 2023-12-10 RX ADMIN — MYCOPHENOLATE MOFETIL 500 MG: 500 INJECTION, POWDER, LYOPHILIZED, FOR SOLUTION INTRAVENOUS at 21:14

## 2023-12-10 RX ADMIN — FLUCONAZOLE 200 MG: 2 INJECTION, SOLUTION INTRAVENOUS at 16:40

## 2023-12-10 ASSESSMENT — ACTIVITIES OF DAILY LIVING (ADL)
ADLS_ACUITY_SCORE: 25
ADLS_ACUITY_SCORE: 30
ADLS_ACUITY_SCORE: 26
ADLS_ACUITY_SCORE: 30
ADLS_ACUITY_SCORE: 27

## 2023-12-10 NOTE — PROGRESS NOTES
SURGICAL ICU PROGRESS NOTE  December 10, 2023      CO-MORBIDITIES:   Acute post-operative pain  Liver replaced by transplant (H)  Status post kidney transplant    ASSESSMENT: Damion Quinones is a 68 year old male with a history of PAF, obesity, HTN, pre-diabetes, rheumatoid and psoriatic arthritis, CAD, and cirrhosis (alcohol + hemochromatosis) and 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 He underwent a redo mesh repair of umbilical hernia and minimal DHEERAJ of small bowel 12/7/23 with Dr. Clifton. He presented 12/9 from home with severe LLQ abdominal pain and N/V. CT without contrast without evidence of obstruction or perforation. Throughout the day 12/9 in Afib with RVR, progressively hypotensive despite aggressive fluid resuscitation, ultimately requiring pressor support and transfer to SICU.      CHANGES AND UPDATES TODAY:   - RTOR with transplant  - Wean off peripheral NE  - Possible diuresis later today  - Stop vancomycin given negative MRSA swab  - Continue fluconazole and zosyn, Follow sputum culture   - Further plans pending OR    PLAN:  Neurological:  # Acute post-op pain   # Peripheral neuropathy  - Pain control: PRN tylenol, PRN PO oxy, PRN IV dilaudid, PTA gabapentin, capsaicin cream  - Monitor neurological status. Delirium preventions and precautions.      Pulmonary:   # Respiratory insufficiency  # Pulmonary edema, atelectasis  - Careful monitoring of fluid resuscitation with respiratory status, hold off on diuresis for now  - Supplemental oxygen to keep saturation above 92%, Stable on 6L NC, Wean as tolerated     Cardiovascular:    # Hx CAD  # Atrial fibrillation with RVR   # Shock, septic vs mixed  - Most recent echo 12/9              Technically difficult study. Extremely difficult acoustic windows despite the  use of contrast for endcardial border definition.  Global and regional left ventricular function is normal with an EF of 55-60%.  Right ventricular function cannot be  assessed due to poor image quality.  No pericardial effusion is present  - MAP goal > 65, Peripheral norepinephrine started on floor, Wean as tolerated   - Cardiology consulted 12/9, appreciate recs. Discussed with their team at time of worsening hypotension and ongoing Afib with RVR, will consider cardioversion if needed, however not emergent at this time and Afib possibly secondary to other underlying process such as infection  - Hold PTA metoprolol while requiring pressor  - Digoxin 500 mcg given 12/9 AM, repeat dose 250mcg given overnight  - Amiodarone bolus and gtt      Gastroenterology/Nutrition:  # Hx cirrhosis s/p SLK 6/6/23  # Delayed return of bowel function s/p redo umbilical hernia mesh repair, DHEERAJ 12/7  # Lactic acidosis  - Continue NPO, NGT to LIS, 800cc/24H  - CT-scan without obvious acute pathology, however some loculated ascites seen  - RTOR today   - Lactate downtrending. Continue to monitor  - Continue holding nutrition, However will consider need for parenteral nutrition in coming days if necessary       Renal/Fluids/Electrolytes:   # s/p SLK 6/6/23  # Acute kidney injury   - Cr 1.86, baseline ~1.0  - Multiple fluid boluses yesterday, Anticipate volume overload with third-spacing  - Borderline UOP, 775/24h, Continue to closely monitor   - Diurese later today pending OR  - Lux in place for strict I/O     Endocrine:  # Hypothyroidism   # Stress hyperglycemia    - PTA levothyroxine  - Maintaining blood glucoses <180, No need for sliding scale insulin at this time     ID:  # Concern for septic shock, Improving  - Afebrile, no leukocytosis however procalcitonin 32.9 concerning for infection  - Vancomycin, Zosyn, Fluconazole started empirically 12/9   - Discontinue vanc given negative MRSA   - Follow cultures    # immunosuppression   - MMF/tacrolimus per transplant   # immunoprophylaxis  - Bactrim, Valcyte, Fluconazole (held by transplant)     Heme:     # Hx provoked DVT on apixapan  - PTA apixaban  held, low intensity heparin gtt per transplant  - Transfuse if hgb <7.0 or signs/symptoms of hypoperfusion. Monitor and trend.      Musculoskeletal/Rheum:  # Hx gout   # Weakness and deconditioning of critical illness   - PTA allopurinol, prednisone  - Physical and occupational therapy consult      General Cares/Prophylaxis:    DVT Prophylaxis: heparin gtt  GI Prophylaxis: PPI  Restraints: Restraints for medical healing needed: NO     Lines/ tubes/ drains:  - NGT, PIV, Lux     Disposition:  - Surgical ICU    ====================================    SUBJECTIVE:   Awake, alert, oriented. Some dyspnea. Abdomen distended. Endorses some pain.     OBJECTIVE:   1. VITAL SIGNS:   Temp:  [97.4  F (36.3  C)-100.1  F (37.8  C)] 100.1  F (37.8  C)  Pulse:  [] 101  Resp:  [9-36] 21  BP: ()/(35-87) 103/63  SpO2:  [89 %-100 %] 95 %  Resp: 21      2. INTAKE/ OUTPUT:   I/O last 3 completed shifts:  In: 7401.57 [I.V.:7401.57]  Out: 1360 [Urine:800; Emesis/NG output:560]    3. PHYSICAL EXAMINATION:   General: Lethargic  Neuro: A&Ox3, NAD  Resp: Breathing non-labored on 3L nC  CV: Afib, Rate-controlled  Abdomen: Distended, Expected post-operative changes around prior incision site  : Lux catheter in place with dark yellow urine  Extremities: warm and well perfused, 2+ edema    4. INVESTIGATIONS:   Arterial Blood Gases   Recent Labs   Lab 12/09/23  1314   PH 7.38   PCO2 37   PO2 74*   HCO3 22     Complete Blood Count   Recent Labs   Lab 12/10/23  0233 12/09/23  2152 12/09/23  1336 12/09/23  0554   WBC 5.7 6.2 5.2 5.0   HGB 13.3 15.4 15.4 17.7    192 181 257     Basic Metabolic Panel  Recent Labs   Lab 12/10/23  0233 12/09/23  2325 12/09/23  2152 12/09/23  1052 12/09/23  0159     --  139 137 137   POTASSIUM 4.3  --  4.8 5.1 4.9   CHLORIDE 104  --  105 104 104   CO2 21*  --  21* 21* 21*   BUN 30.2*  --  27.9* 25.7* 20.3   CR 1.86*  --  1.76* 1.62* 1.08   * 96 109* 122* 147*     Liver Function  Tests  Recent Labs   Lab 12/10/23  0233 12/09/23  2152 12/09/23  1052 12/08/23  1109   AST 26 31 24 19   ALT 9 10 9 10   ALKPHOS 71 92 91 133   BILITOTAL 1.1 0.9 0.8 0.4   ALBUMIN 3.3* 2.6* 2.8* 3.8     Pancreatic Enzymes  No lab results found in last 7 days.  Coagulation Profile  No lab results found in last 7 days.      5. RADIOLOGY:   Recent Results (from the past 24 hour(s))   XR Chest Port 1 View    Narrative    EXAM: XR CHEST PORT 1 VIEW  12/9/2023 8:30 AM      HISTORY: worsening hypoxia    COMPARISON: 12/20/2023    FINDINGS: Single view of the chest. Enteric tube courses inferiorly  below the diaphragm with tip out of the field of view. Trachea is  midline. Stable enlarged cardiac silhouette. Low lung volumes. Streaky  perihilar and bibasilar pulmonary opacities. No definite pleural  effusion, though the left costophrenic angle is collimated out of the  field-of-view. No pneumothorax.      Impression    IMPRESSION:   Cardiomegaly with streaky perihilar and bibasilar pulmonary opacities,  likely representing pulmonary edema and atelectasis in the setting of  low lung volumes.    I have personally reviewed the examination and initial interpretation  and I agree with the findings.    RIKI OSPINA DO         SYSTEM ID:  [id number]   XR Abdomen Port 1 View    Narrative    EXAMINATION:  XR ABDOMEN PORT 1 VIEW 12/9/2023     COMPARISON: 12/8/2023 CT.    HISTORY: Admit with N/V s/p hernia repair. Received PO contrast for CT  scan at 13:30. Please follow contrast to see if it makes it to colon..    TECHNIQUE: Frontal supine view of the abdomen.    FINDINGS: No appreciable oral contrast opacifying the bowel. No  abnormally dilated loops of bowel, free air, or pneumatosis in the  visualized abdomen.. No portal venous gas.  Embolization coils and  surgical clips project over the upper abdomen.      Impression    IMPRESSION:   1. No appreciable oral contrast opacifying the bowel, likely due to  low concentration required  for CT imaging and dilution since  administration.  2. Nonobstructive bowel gas pattern.    I have personally reviewed the examination and initial interpretation  and I agree with the findings.    RIKI OSPINA DO         SYSTEM ID:  [id number]   CT Abdomen Pelvis w/o Contrast    Narrative    EXAMINATION: CT ABDOMEN PELVIS W/O CONTRAST, 12/9/2023 10:02 AM    TECHNIQUE:  Helical CT images from the lung bases through the pubic  symphysis were obtained without IV contrast.     COMPARISON: 12/8/2023    HISTORY: look for any free air    FINDINGS:    Abdomen and pelvis: Stable surgical changes of hepatic  transplantation. No appreciable focal hepatic lesion on these  noncontrast images. Surgically absent gallbladder. No intra or  extrahepatic biliary dilation. Mildly atrophic pancreas. Nonenlarged  spleen. Stable macroscopic fat containing right adrenal myelolipoma  measuring 9 mm. Stable microscopic fat-containing left adrenal adenoma  measuring 1.3 cm. Atrophic native kidneys with unchanged fluid  attenuation cysts arising from the lower pole of the left kidney.  Right lower quadrant renal transplant cortical lesion. No  hydronephrosis, hydroureter, or urinary tract stone. Excreted contrast  within the calyces of the right lower quadrant renal transplant and  within the bladder lumen. Dystrophic calcifications in the mildly  enlarged prostate. Symmetric seminal vesicles. Increased small to  moderate volume ascites with loculated components along the anterior  aspect of the stomach, within the greater omentum, and within the  central mesentery. Intermediate to high attenuation material  collecting within the ascites in the pelvis anterior to the rectum.  Scattered tiny foci of free air throughout the anterior  intraperitoneal space. Gastric tube tip and sidehole in the stomach.  No dilated small or large bowel. Mild to moderate colonic stool  burden. Scattered air-fluid levels throughout the small bowel. Minimal  dilute  oral contrast opacifies a few loops of small bowel in the  central abdomen. No appreciable extraluminal leakage of contrast.  Moderate aortoiliac atherosclerotic calcification without aneurysmal  dilation. No abdominal or pelvic lymphadenopathy.    Lung bases:  Small left and trace right pleural effusions with  adjacent atelectasis greatest in the left lower lobe. Cardiomegaly  with moderate coronary artery calcifications.    Bones and soft tissues: Umbilical hernia repair with mesh with  continued loculated fluid and foci of air within the periumbilical  subcutaneous fat. Additional foci of postoperative subcutaneous  emphysema within the supraumbilical abdominal wall subcutaneous fat.  Upper abdominal and right lower quadrant abdominal wall incisional  scar is related to hepatic and renal transplantations. Abdominal wall  and upper thigh subcutaneous edema. Bilateral gynecomastia. Multilevel  degenerative change in the spine. No acute or aggressive osseous  abnormality. Avascular necrosis of the left femoral head without  evidence of subchondral collapse.      Impression    IMPRESSION:   1. Increased small to moderate volume loculated ascites with continued  small volume postoperative pneumoperitoneum status post umbilical  hernia repair with mesh. No evidence of hollow viscus perforation.  Small amount of intermediate to high attenuation material collecting  within the ascites in the pelvis may represent postoperative blood  products, however, no evidence of acute hemorrhage.  2. Stable surgical changes of hepatic and right lower quadrant renal  transplantation.  3. Small left and trace right pleural effusions with adjacent  atelectasis.  4. Other chronic and incidental findings as detailed in the body of  the report.    RIKI OSPINA DO         SYSTEM ID:  [id number]   Echo Complete   Result Value    LVEF  55-60%    Narrative    [id number]  [id number]  [id number]  [id number]^DIANE^ELIZA^THAI     [facility]  [facility]  Echocardiography Laboratory  [address] [place] [address]     Name: EMILIANA SCHREIBER  MRN: [id number]  : 1955  Study Date: 2023 11:06 AM  Age: 68 yrs  Gender: Male  [facility]  Reason For Study: Heart Failure  Ordering Physician: ELIZA CONTRERAS  Performed By: Virginia Sharif RDCS     BSA: 2.2 m2  Height: 67 in  Weight: 235 lb  ______________________________________________________________________________  Procedure  Complete Portable Echo Adult. Contrast Optison. Technically difficult  study.Extremely difficult acoustic windows despite the use of contrast for  endcardial border definition.  ______________________________________________________________________________  Interpretation Summary  Technically difficult study.Extremely difficult acoustic windows despite the  use of contrast for endcardial border definition.  Global and regional left ventricular function is normal with an EF of 55-60%.  Right ventricular function cannot be assessed due to poor image quality.  No pericardial effusion is present.  ______________________________________________________________________________  Left Ventricle  Global and regional left ventricular function is normal with an EF of 55-60%.     Right Ventricle  Right ventricular function cannot be assessed due to poor image quality.     Mitral Valve  Mild mitral annular calcification is present. Trace mitral insufficiency is  present.     Aortic Valve  Aortic valve sclerosis is present. On Doppler interrogation, there is no  significant stenosis or regurgitation.     Vessels  The inferior vena cava cannot be assessed.     Pericardium  No pericardial effusion is present.     Compared to Previous Study  The left ventricular function has remained the same.     ______________________________________________________________________________  Doppler Measurements & Calculations  TR max xander: 189.6 cm/sec  TR max P.4 mmHg      ______________________________________________________________________________  Report approved by: MD Shaggy Castle 12/09/2023 11:51 AM         US Lower Extremity Venous Duplex Bilateral    Narrative    EXAMINATION: DOPPLER VENOUS ULTRASOUND OF BILATERAL LOWER EXTREMITIES,  12/9/2023 3:36 PM     COMPARISON: 9/20/2023.    HISTORY: Evaluate for DVT due to tachycardia. Concern for PE and  unable to give IV contrast.    TECHNIQUE:  Gray-scale evaluation with compression, spectral flow and  color Doppler assessment of the deep venous system of both legs from  groin to knee, and then at the ankles.    FINDINGS:  In both lower extremities, the common femoral, femoral, popliteal and  posterior tibial veins demonstrate normal compressibility and blood  flow. The peroneal veins are not visualized.      Impression    IMPRESSION: Peroneal veins are not visualized, otherwise no DVT  demonstrated in either lower extremity    I have personally reviewed the examination and initial interpretation  and I agree with the findings.    KURTIS JENSEN MD         SYSTEM ID:  [id number]   XR Chest Port 1 View    Impression    RESIDENT PRELIMINARY INTERPRETATION  IMPRESSION: New left lower lobe atelectasis/consolidation.       =========================================

## 2023-12-10 NOTE — PROGRESS NOTES
Skin check completed by Nicol GALLO and Casie CERNA. Notable ABD scars from previous Kidney and Liver Tx, ABD umbilical incision from surgery on 12/7/23. BLE are karli and cool, one scar on shin on LLE.

## 2023-12-10 NOTE — CODE/RAPID RESPONSE
"   12/09/23 1800   Call Information   Date of Call 12/09/23   Time of Call 1811   Name of person requesting the team Toyin Yeh   Title of person requesting team RN   RRT Arrival time 1813   Time RRT ended 1845   Reason for call   Type of RRT Adult   Primary reason for call Sepsis suspected   Sepsis Suspected Elevated Lactate level   Was patient transferred from the ED, ICU, or PACU within last 24 hours prior to RRT call? Yes   SBAR   Situation Lactic Acid 4.3   Background Per transplant surgery note: \"Damion Quinones is a 68 year old male with a history of PAF, obesity, HTN, pre-diabetes, rheumatoid and psoriatic arthritis, CAD, and cirrhosis (alcohol + hemochromatosis) and 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 He underwent a redo mesh repair of umbilical hernia and minimal DHEERAJ of small bowel 12/7/23 with Dr. Clifton. He now presents from home with severe LLQ abdominal pain and N/V. \"   Notable History/Conditions Transplant;Hypertension;Diabetes;End-Stage disease;Cardiac   Assessment Patient alert and oriented lying in bed. Afebrile, tachycardic 120-130s with BP 80-90mmHg systolic, MAPs remain >65mmHg. Fluid net positive 5-6L with only 600mL urine output in last 12 hours despite 40mg lasix given. Pitting edema in lower extremities and abdomen. Lung sounds clear to auscultation. IV antibiotics started today.   Interventions Labs   Patient Outcome   Patient Outcome Stabilized on unit   RRT Team   Attending/Primary/Covering Physician Tranplant Surgery   Date Attending Physician notified 12/09/23   Time Attending Physician notified 1815  (At bedside)   Physician(s) Jeri Ch PA-C   Lead RN Eddie Loza RN   RN Toyin Yeh, LORIN   RT NA   Other staff NA   Post RRT Intervention Assessment   Post RRT Assessment Worsened   Date Follow Up Done 12/09/23   Time Follow Up Done 2142   Comments Pt appearance unchanged. Remains in [id number]'s, MAPs barely above 65. Started on amiodarone, " bolus refused d/t low pressures. Lactic increased from 4.3 to 5.6.

## 2023-12-10 NOTE — CODE/RAPID RESPONSE
Rapid Response Team Note    Assessment   In assessment a rapid response was called on Damion Quinones due to lactic acidosis. This presentation is likely due to sepsis.  .     Plan   -  Continue Broad spectrum abx  - Hold off additional IVF at this time  - Monitor BP q15 min for next hour  - CMP   - Monitor UOP  -  The General Surgery primary team was  at bedside .  -  Disposition: The patient will remain on the current unit. We will continue to monitor this patient closely.  -  Reassessment and plan follow-up will be performed by the primary team    ADDENDUM: Patient hypotensive SBP 50-80 w/ MAPS 40-60, -641v afib with rising LA. Amiodarone gtt stopped. LAbd obtained. Peripheral pressors started and patient transferred to ICU. Patient remains w/ pain at site of recent mesh hernia repair, though no sob, dizziness,fever, chills,      Jeri Ch PA-C  [facility] RRT [facility] Job Code Contact #[phone]  [facility] Paging/Directory    Hospital Course   Brief Summary of events leading to rapid response:   RRT called for Lactic Acidosis w/ LA 4.3 in setting of hypotension w/ SBPs 80-90s MAPS 70-80s. Patietn POD#1 from hernia mesh repair. Complains of mild abdominal pain, though no CP, sob, dizziness, HA,. We discussed POC at length with patient, family at bedside, surgical team, and bedside RN.     Admission Diagnosis:   Liver replaced by transplant (H) [Z94.4]  Acute post-operative pain [G89.18]  Status post kidney transplant [Z94.0]    Physical Exam   Temp: 98.5  F (36.9  C) Temp  Min: 97.4  F (36.3  C)  Max: 98.5  F (36.9  C)  Resp: 24 Resp  Min: 9  Max: 33  SpO2: 92 % SpO2  Min: 92 %  Max: 100 %  Pulse: (!) 130 Pulse  Min: 101  Max: 147    No data recorded  BP: 92/66 Systolic (24hrs), Av , Min:80 , Max:133   Diastolic (24hrs), Av, Min:54, Max:96     I/Os: I/O last 3 completed shifts:  In: 5234.25 [I.V.:4984.25; IV Piggyback:250]  Out: 750 [Urine:350; Emesis/NG output:400]     Exam:   General: in no  "acute distress  Mental Status: AAOx4.  Resp: Breathing non labored on 3L via NC  CV: Irregularly Irregular  Skin: Jimmie appearing bilateral LE- cool to touch    Neuro: Moves all extremities  GI: BS+, mild TTP across abdomen. NO drainage noted from site of recent hernia repair     Significant Results and Procedures   Lactic Acid:   Recent Labs   Lab Test 12/09/23  1748 12/09/23  1336 12/09/23  1153   LACT 4.3* 3.3* 3.6*     CBC:   Recent Labs   Lab Test 12/09/23  1336 12/09/23  0554 12/08/23 2002 12/08/23  1948   WBC 5.2 5.0  --  5.9   HGB 15.4 17.7 18.4* 17.1   HCT 50.9 56.7* 54* 54.5*    257  --  287        Sepsis Evaluation   The patient is known to have an infection.  Damion Quinones meets SIRS criteria AND has a lactate >2 or other evidence of acute organ damage.  These vital signs, lab and physical exam findings constitute a diagnosis of SEVERE SEPSIS, based on: Lactate resulted, and the level was > 2.0          Anti-infectives (From now, onward)      Start     Dose/Rate Route Frequency Ordered Stop    12/09/23 1100  [Held by provider]  fluconazole (DIFLUCAN) intermittent infusion 400 mg        (On hold since today at 1752 until manually unheld; held by Shannon Delacruz PA-CHold Reason: OtherHold Comments: Hold  next dose. Will evaluate patient in AM.)    400 mg  100 mL/hr over 120 Minutes Intravenous EVERY 24 HOURS 12/09/23 0855      12/09/23 0930  vancomycin (VANCOCIN) 1,500 mg in 0.9% NaCl 250 mL intermittent infusion         1,500 mg  over 90 Minutes Intravenous EVERY 18 HOURS 12/09/23 0904      12/09/23 0825  piperacillin-tazobactam (ZOSYN) 3.375 g vial to attach to  mL bag        Note to Pharmacy: For [facility], [facility] and [facility]: For Zosyn-naive patients, use the \"Zosyn initial dose + extended infusion\" order panel.    3.375 g  over 30 Minutes Intravenous EVERY 6 HOURS 12/09/23 0823      12/09/23 0800  sulfamethoxazole-trimethoprim (BACTRIM) 400-80 MG per tablet 1 tablet        Note to Pharmacy: PTA " Sig:Take 1 tablet by mouth daily      1 tablet Oral DAILY 12/08/23 1313      12/08/23 2200  [Held by provider]  ciprofloxacin (CIPRO) tablet 500 mg        (On hold since today at 0824 until manually unheld; held by Shannon Delacruz PA-CHold Reason: OtherHold Comments: Broadened antibiotics to zosyn)   Note to Pharmacy: [facility] Sig:Take 1 tablet (500 mg) by mouth 2 times daily      500 mg Oral 2 TIMES DAILY 12/08/23 1315            Current antibiotic coverage is appropriate for source of infection.    3 Hour Severe Sepsis Bundle Completion:  1. Initial Lactic Acid result shown above. Repeat lactic acid ordered by reflex for 3 hours from initial collection.  2. Blood Cultures before Antibiotics: Yes  3. Broad Spectrum Antibiotics Administered: yes  4. Is hypotension present? Yes. (Definition - 2 SBPs <90, MAP <65, or decrease > 40 from baseline due to infection w/in 3 hrs of each other during the time period of 6 hrs before and 3 hrs after time zero)   Full 30 mL/kg bolus given (see amount below).    BMI Readings from Last 1 Encounters:   12/07/23 36.88 kg/m      30 mL/kg fluids based on weight: 3,200 mL  30 mL/kg fluids based on IBW (must be >= 60 inches tall): 1,980 mL

## 2023-12-10 NOTE — PROGRESS NOTES
"CLINICAL NUTRITION SERVICES - ASSESSMENT NOTE     Nutrition Prescription    Malnutrition Status:    Unable to determine due to incomplete NFPE    Recommendations already ordered by Registered Dietitian (RD):  Dosing wt: 77 kg  Volume max concentration per PharmD  Dextrose 125 grams   grams  250 mL Lipids 6 times weekly (infuse @ 21 mL/hr x ~12 hrs/day or until full 250 mL volume infused).  This will provide: 1334 kcals (17 kcals/kg), 1.6 g/kg protein, 33% calories from fat, GIR 1.08  Advance dextrose 50 grams daily per discretion of PharmD/RD to goal dextrose 225 grams. Goal TPN will provide 1674 kcals (22 kcals/kg, 1.6 g/kg protein, GIR 2.03, 26% calories from fat   Future/Additional Recommendations:  -- monitor lytes with dextrose adv     REASON FOR ASSESSMENT  Damion Quinones is a/an 68 year old male assessed by the dietitian for Pharmacy/Nutrition to Start and Manage PN    Per chart review patient with a history of PAF, obesity, HTN, pre-diabetes, rheumatoid and psoriatic arthritis, CAD, and cirrhosis and ESKD - s/p SLK 6/6/23     NUTRITION HISTORY  + NGT to LIS. Intubated and sedated.     CURRENT NUTRITION ORDERS  Diet: NPO    LABS  Labs reviewed  (12/10): BUN 31.8 mg/dL (H), Cr 1.85 mg/dL (H)     MEDICATIONS  Medications reviewed  Lipitor    ANTHROPOMETRICS  Height: 170.2 cm (5' 7\")  Most Recent Weight: 106.8 kg (12/7/23)    IBW: 67.3 kg  BMI: Obesity Grade II BMI 35-39.9  Weight History:   Wt Readings from Last 10 Encounters:   12/07/23 106.8 kg (235 lb 7.2 oz)   11/29/23 107 kg (236 lb)   11/28/23 109.8 kg (242 lb 1.6 oz)   11/09/23 107.5 kg (237 lb)   11/06/23 102.1 kg (225 lb)   09/30/23 100 kg (220 lb 8 oz)   09/18/23 99.9 kg (220 lb 4.8 oz)   09/05/23 99.8 kg (220 lb)   08/29/23 100.7 kg (222 lb)   08/22/23 101.1 kg (222 lb 12.8 oz)   No recent weight loss  Dosing Weight: 77 kg (adjBW based on IBW and most recent weight)    ASSESSED NUTRITION NEEDS  Estimated Energy Needs: 7023-9645 kcals/day (20 - " 25 kcals/kg)  Justification: Obese and Post-op  Estimated Protein Needs: 116-154 grams protein/day (1.5 - 2 grams of pro/kg)  Justification: Increased needs and Post-op  Estimated Fluid Needs: 3496-1911 mL/day (25 - 30 mL/kg)   Justification: Maintenance    PHYSICAL FINDINGS  See malnutrition section below.  Unable to complete NFPE as patient was busy with other disciplines.     MALNUTRITION  % Intake: Unable to assess  % Weight Loss: None noted  Subcutaneous Fat Loss: Unable to assess  Muscle Loss: Unable to assess  Fluid Accumulation/Edema: Unable to assess  Malnutrition Diagnosis: Unable to determine due to incomplete NFPE    NUTRITION DIAGNOSIS  Inadequate oral intake related to s/p surgery as evidenced by NPO status, NGT to LIS and the need for alternative route for nutrition       INTERVENTIONS  Implementation  Nutrition Education: Not appropriate at this time due to patient condition   Collaboration with other providers - discussed TPN initiation with PharmD   Parenteral Nutrition/IV Fluids - Initiate     Goals  Total avg nutritional intake to meet a minimum of 20 kcal/kg and 1.5 g PRO/kg daily (per dosing wt 77 kg).     Monitoring/Evaluation  Progress toward goals will be monitored and evaluated per protocol.  Darlene Hammer MS/RD/LD/CNSC  [address] pager: [phone]  Ascom: [phone]  Weekend/Holiday RD pager: [phone]

## 2023-12-10 NOTE — CODE/RAPID RESPONSE
" 12/09/23 2200   Call Information   Date of Call 12/09/23   Time of Call 2139   Name of person requesting the team Mya Shannon   Title of person requesting team RN   RRT Arrival time 2142   Time RRT ended 2322   Reason for call   Type of RRT Adult   Primary reason for call Sepsis suspected   Sepsis Suspected SPB <90 or MAP 40mmHG;Heart Rate > 100;Elevated Lactate level   SBAR   Situation Lactic Acid 5.6.   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 He underwent a redo mesh repair of umbilical hernia and minimal DHEERAJ of small bowel 12/7/23 with Dr. Clifton. He now presents from home with severe LLQ abdominal pain and N/V. \"   Notable History/Conditions Transplant;Hypertension;End-Stage disease;Diabetes;Cancer   Assessment Pt oriented x4, tired but able to turn/pivot back to bed. MAPs >65, remains in afib 120-130's after starting amiodarone 1mg. Pt abdominal pain and respiratory status unchanged. Remains on 6L NC. Pt w/ low UO in comparison to 6L fluid resusitation during the day.   Interventions Labs   Patient Outcome   Patient Outcome Transferred to  (4C)   RRT Team   Attending/Primary/Covering Physician Transplant Surgery   Date Attending Physician notified 12/09/23   Time Attending Physician notified 2145  (notified earlier, now at bedside at)   Physician(s) Jeri Ch PA-C   Lead RN Saravanan Miles   Post RRT Intervention Assessment   Post RRT Assessment Transferred to ICU   Date Follow Up Done 12/09/23   Time Follow Up Done   (Did not leave pt bedside)   Comments MAPs dropped to 50's, amio turned off, started on peripheral levo at 0.05 and 50g of albumin. Transferred down to 4C     "

## 2023-12-10 NOTE — PROGRESS NOTES
BRIEF TRANSPLANT SURGERY PROGRESS NOTE    Rapid response called on Mr. Quinones for elevated lactic of 4.3 from 3.3. Went and assessed patient. Patient in Afib with RVR (has history of chronic Afib with PTA 50 mg BID Metoprolol as home med) with rates in 130s - 140s with SBP 90 - 110s, received Digoxin earlier in the day given patient's variable systolic blood pressures. Infectious workup already completed prior to shift, received ~5L of crystalloid prior to starting shift as well as broad spectrum antibiotics (Vanc/Zosyn), BLE U/S obtained prior to shift showing no DVT and patient started on empiric heparin gtt earlier. Discussed with rapid team and agreed upon holding off on additional fluid at that time and ordered repeat CMP, Mag, Phos, and repeat CXR ordered given patient's volume status and oxygen requirements. Afterward discussed with on call cardiology fellow who recommend Amio bolus + gtt which was then ordered STAT. Ongoing discussion with transplant fellows Dr. Rodas and Dr. Verma on status of patient. Abdominal exam at that time was soft, moderately distended, no active pus or drainage from surgical incision, some areas of erythema present, tender around surgical site, not peritonitic or rigid. Patient during this time eliciting abdominal pain near surgical site however no increasing work of breathing, shortness of breath, chest pain/pressure/palpitations, headache, dizziness/lightheadedness.     Assessed patient between 1900 - 2000, appeared stable with rates between 120 - 140s with systolics between 90 - 100, MAPs > 65.    Repeat lactic @2111 5.6 up from 4.3 @1748. Additional RRT called. Again went to bedside to evaluate patient as well as alerted SICU resident Dr. Peña about status of patient. Patient still largely asymptomatic, primarily complaining of abdominal pain on palpation, however systolic blood pressures were then 50s - 80s. Patient evaluated by myself and Dr. Peña and after  discussion with transplant fellows and staff, decision made to transfer patient to SICU given hemodynamic instability in the setting of Afib with RVR likely due to underlying sepsis/infectious process. Procalcitonin ordered, resulting at 32.9 @ 2152 further supporting evidence of infectious process. Family updated at bedside by RRT and myself, agreed upon transfer to higher level of cares.

## 2023-12-10 NOTE — CONSULTS
[facility]    Consult Note - Surgical ICU Service  Date of Admission:  12/8/2023  Consult Requested by: Transplant  Reason for Consult: Hemodynamic instability requiring pressor    Assessment & Plan: Surgery   Damion Quinones is a 68 year old male with a history of PAF, obesity, HTN, pre-diabetes, rheumatoid and psoriatic arthritis, CAD, and cirrhosis (alcohol + hemochromatosis) and 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 He underwent a redo mesh repair of umbilical hernia and minimal DHEERAJ of small bowel 12/7/23 with Dr. Clifton. He presented 12/9 from home with severe LLQ abdominal pain and N/V. CT without contrast without evidence of obstruction or perforation. Throughout the day 12/9 in Afib with RVR, progressively hypotensive despite aggressive fluid resuscitation, ultimately requiring pressor support and transfer to SICU.        Neurological:  # Acute post-op pain   # Peripheral neuropathy  - Pain control: IV dilaudid prn   - PTA gabapentin, capsaicin cream  - Monitor neurological status. Delirium preventions and precautions.     Pulmonary:   # Respiratory insufficiency  # Pulmonary edema, atelectasis  - Careful monitoring of fluid resuscitation with respiratory status, hold off on diuresis for now  - Supplemental oxygen to keep saturation above 92 %.      Cardiovascular:    # Hx CAD  # Atrial fibrillation with RVR   # Shock, septic vs mixed  - Most recent echo 12/9   Technically difficult study. Extremely difficult acoustic windows despite the  use of contrast for endcardial border definition.  Global and regional left ventricular function is normal with an EF of 55-60%.  Right ventricular function cannot be assessed due to poor image quality.  No pericardial effusion is present  - MAP goal > 65, peripheral norepinephrine started on floor, continue for now  - Cardiology consulted 12/9, appreciate recs. Discussed with their team at time of  worsening hypotension and ongoing Afib with RVR, will consider cardioversion if needed, however not emergent at this time and Afib possibly secondary to other underlying process such as infection  - Hold PTA metoprolol while requiring pressor  - digoxin 500 mcg given 12/9 AM, repeat dose 250 mcg given in ICU  - amiodarone bolus and gtt      Gastroenterology/Nutrition:  #Hx cirrhosis s/p SLK 6/6/23  # delayed return of bowel function s/p redo umbilical hernia mesh repair, DHEERAJ 12/7  # Lactic acidosis  - Continue NPO, NGT to LIS  - Lactate downtrending. Continue to monitor  - RD consult. Appreciate cares and recommendations.     Renal/Fluids/Electrolytes:   # s/p SLK 6/6/23  # Acute kidney injury   - Cr 1.86, baseline ~1.0  - Borderline UOP, continue to closely monitor   - Lux in place for strict I/O    Endocrine:  # Hypothyroidism   # Stress hyperglycemia    - PTA levothyroxine  - Insulin sliding scale    ID:  # Concern for septic shock  - Afebrile, no leukocytosis however procalcitonin 32.9 concerning for infection  - Vancomycin, Zosyn started empirically 12/9    # immunosuppression   - MMF/tacrolimus per transplant   # immunoprophylaxis  - Bactrim, Valcyte, Fluconazole (held by transplant)    Heme:     # Hx provoked DVT on apixapan  - PTA apixaban held, low intensity heparin gtt per transplant  - Transfuse if hgb <7.0 or signs/symptoms of hypoperfusion. Monitor and trend.     Musculoskeletal/Rheum:  # Hx gout   # Weakness and deconditioning of critical illness   - PTA allopurinol, prednisone  - Physical and occupational therapy consult       General Cares/Prophylaxis:    DVT Prophylaxis: heparin gtt  GI Prophylaxis: PPI  Restraints: Restraints for medical healing needed: NO    Lines/ tubes/ drains:  - NGT, PIV, Lux    Disposition:  -  Surgical ICU    Patient seen, findings and plan discussed with surgical ICU staff, Dr. Hernandez.    Clinically Significant Risk Factors            # Hypomagnesemia: Lowest Mg = 1.4  "mg/dL in last 2 days, will replace as needed   # Hypoalbuminemia: Lowest albumin = 2.6 g/dL at 12/9/2023  9:52 PM, will monitor as appropriate     # Hypertension: Noted on problem list        # Obesity: Estimated body mass index is 36.88 kg/m  as calculated from the following:    Height as of 12/7/23: 1.702 m (5' 7\").    Weight as of 12/7/23: 106.8 kg (235 lb 7.2 oz)., PRESENT ON ADMISSION          The patient's care was discussed with the Attending Physician, Dr. Hernandez .    Lindsay Peña MD  [facility]  Text page via 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 He underwent a redo mesh repair of umbilical hernia and minimal DHEERAJ of small bowel 12/7/23 with Dr. Clifton. He presented 12/9 from home with severe LLQ abdominal pain and N/V. CT without contrast without evidence of obstruction or perforation. Throughout the day 12/9 in Afib with RVR, progressively hypotensive despite aggressive fluid resuscitation, ultimately requiring pressor support and transfer to SICU.   On arrival to SICU he is awake and interactive. No specifics complaints.     Past Medical History    Past Medical History:   Diagnosis Date    Alcoholic cirrhosis of liver with ascites (H) 10/11/2019    ANCA-associated vasculitis (H) 2022    Antiplatelet or antithrombotic long-term use     C. difficile colitis     Coronary artery disease     [facility] 4/2023 - complete occlusion of RCA    ESRD (end stage renal disease) on dialysis (H)     Gout     HCC (hepatocellular carcinoma) (H) 09/05/2023    History " of hemochromatosis 10/11/2019    Hypertension     IgA nephropathy     Obesity     PAF (paroxysmal atrial fibrillation) (H)     Pre-diabetes     Psoriatic arthritis (H)     RA (rheumatoid arthritis) (H)     Status post kidney transplant 2023    Induction with thymoglobulin 4mg/kg, + DSA CW9    Status post liver transplantation (H) 2023       Past Surgical History   Past Surgical History:   Procedure Laterality Date    APPENDECTOMY      Removed at 18 Years Old     BENCH KIDNEY  2023    Procedure: Bench kidney;  Surgeon: Chad Clifton MD;  Location:  OR    BENCH LIVER  2023    Procedure: Bench liver;  Surgeon: Chad Clifton MD;  Location:  OR    COLONOSCOPY      2014 at [facility]     CV CORONARY ANGIOGRAM N/A 2023    Procedure: Coronary Angiogram;  Surgeon: Pablo Araujo MD;  Location:  HEART CARDIAC CATH LAB    EYE SURGERY Bilateral     Cataract    H STATISTIC PICC LINE INSERTION >5YR, FAILED Left 2023    Unable to advance catheter over the axillary area    HERNIA REPAIR      History of bilateral inguinal hernia repair: 10/28/2014. Open hernia repair: 10/2017. Abdominal wound exploration and debridement 2017    INSERT SHUNT PORTAL TRANSJUGULAR INTRAHEPTIC  2022    IR CVC NON TUNNEL LINE REMOVAL  7/3/2023    IR CVC NON TUNNEL PLACEMENT > 5 YRS  2023    IR CVC TUNNEL PLACEMENT > 5 YRS OF AGE  2023    IR PICC PLACEMENT > 5 YRS OF AGE  2023    IR RENAL BIOPSY RIGHT  2023    RETURN LIVER TRANSPLANT N/A 2023    Procedure: Return liver transplant. Intra-operative ultrasound;  Surgeon: Chad Clifton MD;  Location: [facility] OR    TRANSPLANT KIDNEY RECIPIENT  DONOR N/A 2023    Procedure: Transplant kidney recipient  donor, ureteral stent placement;  Surgeon: Chad Clifton MD;  Location: [facility] OR    TRANSPLANT LIVER RECIPIENT  DONOR N/A 2023    Procedure: Transplant  liver recipient  donor;  Surgeon: Chad Clifton MD;  Location: [facility] OR       Prior to Admission Medications   I have reviewed this patient's current medications       Physical Exam   Vital Signs: Temp: 97.5  F (36.4  C) Temp src: Oral BP: 107/75 Pulse: (!) 131   Resp: 22 SpO2: 96 % O2 Device: Nasal cannula Oxygen Delivery: 6 LPM  Weight: 0 lbs 0 oz  Intake/Output Summary (Last 24 hours) at 20230  Last data filed at 2023 2143  Gross per 24 hour   Intake 6759.08 ml   Output 1275 ml   Net 5484.08 ml     General: NAD, awake, interactive  Cardio: tachycardic, irregularly irregular rhythm on monitor  Resp: breathing non-labored on oxymask, equal chest ris  Abd: soft, diffusely tender to palpation worst in the LUQ, no diffuse peritonitis, diffusely reddened and warm to touch. Umbilical incision some dusky edges, no apparent drainage. Diffuse pitting edema of abdominal wall.   Extremities: wwp, B:E edema      Data     I have personally reviewed the following data over the past 24 hrs:    6.2  \   13.3   / 174     138 104 30.2 (H) /  113 (H)   4.3 21 (L) 1.86 (H) \     ALT: 9 AST: 26 AP: 71 TBILI: 1.1   ALB: 3.3 (L) TOT PROTEIN: 4.9 (L) LIPASE: N/A     Trop: 169 (HH) BNP: 732     Procal: 32.90 (HH) CRP: N/A Lactic Acid: 2.4 (H)         Imaging results reviewed over the past 24 hrs:   Recent Results (from the past 24 hour(s))   XR Chest Port 1 View    Narrative    EXAM: XR CHEST PORT 1 VIEW  2023 8:30 AM      HISTORY: worsening hypoxia    COMPARISON: 2023    FINDINGS: Single view of the chest. Enteric tube courses inferiorly  below the diaphragm with tip out of the field of view. Trachea is  midline. Stable enlarged cardiac silhouette. Low lung volumes. Streaky  perihilar and bibasilar pulmonary opacities. No definite pleural  effusion, though the left costophrenic angle is collimated out of the  field-of-view. No pneumothorax.      Impression    IMPRESSION:   Cardiomegaly with streaky  perihilar and bibasilar pulmonary opacities,  likely representing pulmonary edema and atelectasis in the setting of  low lung volumes.    I have personally reviewed the examination and initial interpretation  and I agree with the findings.    RIKI OSPINA DO         SYSTEM ID:  [id number]   XR Abdomen Port 1 View    Narrative    EXAMINATION:  XR ABDOMEN PORT 1 VIEW 12/9/2023     COMPARISON: 12/8/2023 CT.    HISTORY: Admit with N/V s/p hernia repair. Received PO contrast for CT  scan at 13:30. Please follow contrast to see if it makes it to colon..    TECHNIQUE: Frontal supine view of the abdomen.    FINDINGS: No appreciable oral contrast opacifying the bowel. No  abnormally dilated loops of bowel, free air, or pneumatosis in the  visualized abdomen.. No portal venous gas.  Embolization coils and  surgical clips project over the upper abdomen.      Impression    IMPRESSION:   1. No appreciable oral contrast opacifying the bowel, likely due to  low concentration required for CT imaging and dilution since  administration.  2. Nonobstructive bowel gas pattern.    I have personally reviewed the examination and initial interpretation  and I agree with the findings.    RIKI OSPINA DO         SYSTEM ID:  [id number]   CT Abdomen Pelvis w/o Contrast    Narrative    EXAMINATION: CT ABDOMEN PELVIS W/O CONTRAST, 12/9/2023 10:02 AM    TECHNIQUE:  Helical CT images from the lung bases through the pubic  symphysis were obtained without IV contrast.     COMPARISON: 12/8/2023    HISTORY: look for any free air    FINDINGS:    Abdomen and pelvis: Stable surgical changes of hepatic  transplantation. No appreciable focal hepatic lesion on these  noncontrast images. Surgically absent gallbladder. No intra or  extrahepatic biliary dilation. Mildly atrophic pancreas. Nonenlarged  spleen. Stable macroscopic fat containing right adrenal myelolipoma  measuring 9 mm. Stable microscopic fat-containing left adrenal adenoma  measuring 1.3 cm.  Atrophic native kidneys with unchanged fluid  attenuation cysts arising from the lower pole of the left kidney.  Right lower quadrant renal transplant cortical lesion. No  hydronephrosis, hydroureter, or urinary tract stone. Excreted contrast  within the calyces of the right lower quadrant renal transplant and  within the bladder lumen. Dystrophic calcifications in the mildly  enlarged prostate. Symmetric seminal vesicles. Increased small to  moderate volume ascites with loculated components along the anterior  aspect of the stomach, within the greater omentum, and within the  central mesentery. Intermediate to high attenuation material  collecting within the ascites in the pelvis anterior to the rectum.  Scattered tiny foci of free air throughout the anterior  intraperitoneal space. Gastric tube tip and sidehole in the stomach.  No dilated small or large bowel. Mild to moderate colonic stool  burden. Scattered air-fluid levels throughout the small bowel. Minimal  dilute oral contrast opacifies a few loops of small bowel in the  central abdomen. No appreciable extraluminal leakage of contrast.  Moderate aortoiliac atherosclerotic calcification without aneurysmal  dilation. No abdominal or pelvic lymphadenopathy.    Lung bases:  Small left and trace right pleural effusions with  adjacent atelectasis greatest in the left lower lobe. Cardiomegaly  with moderate coronary artery calcifications.    Bones and soft tissues: Umbilical hernia repair with mesh with  continued loculated fluid and foci of air within the periumbilical  subcutaneous fat. Additional foci of postoperative subcutaneous  emphysema within the supraumbilical abdominal wall subcutaneous fat.  Upper abdominal and right lower quadrant abdominal wall incisional  scar is related to hepatic and renal transplantations. Abdominal wall  and upper thigh subcutaneous edema. Bilateral gynecomastia. Multilevel  degenerative change in the spine. No acute or  aggressive osseous  abnormality. Avascular necrosis of the left femoral head without  evidence of subchondral collapse.      Impression    IMPRESSION:   1. Increased small to moderate volume loculated ascites with continued  small volume postoperative pneumoperitoneum status post umbilical  hernia repair with mesh. No evidence of hollow viscus perforation.  Small amount of intermediate to high attenuation material collecting  within the ascites in the pelvis may represent postoperative blood  products, however, no evidence of acute hemorrhage.  2. Stable surgical changes of hepatic and right lower quadrant renal  transplantation.  3. Small left and trace right pleural effusions with adjacent  atelectasis.  4. Other chronic and incidental findings as detailed in the body of  the report.    RIKI OSPINA,          SYSTEM ID:  [id number]   Echo Complete   Result Value    LVEF  55-60%    Narrative    [id number]  [id number]  [id number]  [id number]^DIANE^ELIZA^THAI     [facility],[place]  Echocardiography Laboratory  [address] [place] [id number]     Name: EMILIANA SCHREIBER  MRN: [id number]  : 1955  Study Date: 2023 11:06 AM  Age: 68 yrs  Gender: Male  Patient Location: [facility]  Reason For Study: Heart Failure  Ordering Physician: ELIZA CONTRERAS  Performed By: Virginia Sharif RDCS     BSA: 2.2 m2  Height: 67 in  Weight: 235 lb  ______________________________________________________________________________  Procedure  Complete Portable Echo Adult. Contrast Optison. Technically difficult  study.Extremely difficult acoustic windows despite the use of contrast for  endcardial border definition.  ______________________________________________________________________________  Interpretation Summary  Technically difficult study.Extremely difficult acoustic windows despite the  use of contrast for endcardial border definition.  Global and regional left ventricular function is normal with an  EF of 55-60%.  Right ventricular function cannot be assessed due to poor image quality.  No pericardial effusion is present.  ______________________________________________________________________________  Left Ventricle  Global and regional left ventricular function is normal with an EF of 55-60%.     Right Ventricle  Right ventricular function cannot be assessed due to poor image quality.     Mitral Valve  Mild mitral annular calcification is present. Trace mitral insufficiency is  present.     Aortic Valve  Aortic valve sclerosis is present. On Doppler interrogation, there is no  significant stenosis or regurgitation.     Vessels  The inferior vena cava cannot be assessed.     Pericardium  No pericardial effusion is present.     Compared to Previous Study  The left ventricular function has remained the same.     ______________________________________________________________________________  Doppler Measurements & Calculations  TR max xander: 189.6 cm/sec  TR max P.4 mmHg     ______________________________________________________________________________  Report approved by: MD Shaggy Castle 2023 11:51 AM         US Lower Extremity Venous Duplex Bilateral    Narrative    EXAMINATION: DOPPLER VENOUS ULTRASOUND OF BILATERAL LOWER EXTREMITIES,  2023 3:36 PM     COMPARISON: 2023.    HISTORY: Evaluate for DVT due to tachycardia. Concern for PE and  unable to give IV contrast.    TECHNIQUE:  Gray-scale evaluation with compression, spectral flow and  color Doppler assessment of the deep venous system of both legs from  groin to knee, and then at the ankles.    FINDINGS:  In both lower extremities, the common femoral, femoral, popliteal and  posterior tibial veins demonstrate normal compressibility and blood  flow. The peroneal veins are not visualized.      Impression    IMPRESSION: Peroneal veins are not visualized, otherwise no DVT  demonstrated in either lower extremity    I have  personally reviewed the examination and initial interpretation  and I agree with the findings.    KURTIS JENSEN MD         SYSTEM ID:  [id number]   XR Chest Port 1 View    Impression    RESIDENT PRELIMINARY INTERPRETATION  IMPRESSION: New left lower lobe atelectasis/consolidation.

## 2023-12-10 NOTE — PROGRESS NOTES
[facility]  Transplant Nephrology Follow Up  Date of Admission:  12/8/2023  Today's Date: 12/10/2023  Requesting physician: Jhon White*    Recommendations:  -Continue pressors to keep MAP>65  -Agree with OR takeback to drain ascites and send fluid for analysis to rule out intra abdominal infection. I think his septic shock is 2/2 LLL PNA but reasonable to rule out.   -Continue vanc/zosyn while awaiting cultures.   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    - Baseline Creatinine: ~ 0.8-1   - Proteinuria: Normal (<0.2 grams)   - Date DSA Last Checked: Aug/2023      Latest DSA: No,  but history of DSA to Cq9 w/  at txp   - BK Viremia: No   - Kidney Tx Biopsy: Jun 20, 2023; Result: negative for rejection. Focal ATN   -Recommend continuing pressors to keep MAP>65     # Liver Tx (SLK): Stable liver graft function. Followed by transplant surgery.     # Immunosuppression Prior to Admission: Tacrolimus immediate release (goal 6-8), Mycophenolic acid (dose 540 mg every 12 hours), and Prednisone (dose 5 mg daily)   - Present Immunosuppression: Tacrolimus immediate release (goal 6-8, sublingual), Mycophenolate mofetil (dose 500 mg every 12 hours) and Prednisone (dose 5 mg daily)   - Patient is in an immunosuppressed state and will continue to monitor for efficacy and toxicity of immunosuppression medications.   - Changes: Yes - decrease MMF to 500mg IV BID due to septic shock and tacrolimus 0.5 mg sublingual BID. Sublingual tac 2/2 ileus    # Infection Prophylaxis:   - PJP: Sulfa/TMP (Bactrim)  - CMV: None, prophylaxis completed    # Umbilical Hernia Mesh Repair: Pt underwent a redo mesh repair of umbilical hernia and minimal DHEERAJ of small bowel 12/7/23 with Dr. Clifton. He presented 12/8/23 from home with severe LLQ abdominal pain  and N/V 12/8/23.   - 12/8/23 CT a/p: 1.  There is a small volume of expected postoperative pneumoperitoneum. Mild-to-moderate volume of abdominal ascites which could be postsurgical in nature. No bowel perforation or obstruction.    - ciprofloxacin 12/8/23 - 12/9   - vancomycin 12/09/23 - present   -zosyn 12/9-present   -Fluconazole given 12/9    # Paroxysmal A-Fib, now with RVR:    -patient had episode of unresponsiveness 12/8/23- possible seizure vs apnea. Extensive workup with evidence of A fib w RVR, s/p PO dose of metoprolol.  Received Digoxin 500 mcg given IV once and subsequent dose of 250mcg on 12/9.  On apixaban PTA.   -Afib driven by septic shock. Started on amiodarone gtt on 12/9   -Hold anticoagulation currently      #Blood Pressure: Hypotensive   Goal BP: MAP > 65   - Volume status: Total body volume up, but intravascularly hypovolemic     - Changes: Not at this time. Continue pressors to keep MAP>65    Prior to admission antihypertensives: metoprolol tartrate 50 mg PO BID    # Steroid induced hyperglycemia: Controlled (HbA1c <7%) Last HbA1c: 5.2% (9/2023)   - Management as per primary team.    # Anemia in Chronic Renal Disease: Hgb: Trend down with IV fluids     FEDERICO: Yes but holding for Hb>10   - Iron studies:  22 (11/21/2023) . Recently completed IV iron.     # Mineral Bone Disorder:   - Secondary renal hyperparathyroidism; PTH level: Normal (15-65 pg/ml)        On treatment: None  - Vitamin D; level: Normal        On supplement: No  - Calcium; level: Normal       On supplement: No  - Phosphorus; level: Normal          On supplement: No    # Electrolytes:   - Potassium; level: High normal        On supplement: No  - Magnesium; level: Normal        On supplement: Yes, magnesium oxide 800 mg PO BID.  Agree with magnesium sulfate 2 g IV once.  - Bicarbonate; level: Low possibly 2/2 lactate   On supplement: No    # Lactic acidosis:   -Continue pressors to keep MAP>65. Agree with OR takeback on 12/10 to  sample ascites fluid, remove mesh and rule out intraabdominal infection   -If he remains on pressors today recommend placement of central line    # Hx of Neuropathy: On Gabapenin  (100 mg in am, 200 mg in the afternoon and 100 mg at bedtime by mouth daily)     # ANCA Vasculitis: S/p Rituximab x2     # Hx of Gout: On prednisone 5mg daily.                -Continue allopurinol 300mg daily.    -Takes anakinra 100mg with gout symptoms.               -Follows with rheumatology at [facility], Dr. Rucker.     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    Subhash Dutta MD  Transplant Nephrology  Contact information available via [facility] Paging/Directory      Interval events:  Mr. Quinones's creatinine is 1.85 (12/10 0631); Trend up but stable  Low but improving urine output.  Other significant labs/tests/vitals: lactate decreased to 2.1, bicarb 15  Transferred to ICU overnight and started on pressors peripherally events overnight.  No chest pain or shortness of breath.  1+leg swelling.  No nausea and vomiting.  Bowel movements are not present  No fever, sweats or chills.      Review of Systems    The 4 point Review of Systems is negative other than noted above or here.     Past Medical History    I have reviewed this patient's medical history and updated it with pertinent information if needed.   Past Medical History:   Diagnosis Date     Alcoholic cirrhosis of liver with ascites (H) 10/11/2019      ANCA-associated vasculitis (H) 2022     Antiplatelet or antithrombotic long-term use      C. difficile colitis      Coronary artery disease     [place] 4/2023 - complete occlusion of RCA     ESRD (end stage renal disease) on dialysis (H)      Gout      HCC (hepatocellular carcinoma) (H) 09/05/2023     History of hemochromatosis 10/11/2019     Hypertension      IgA nephropathy      Obesity      PAF (paroxysmal atrial fibrillation) (H)      Pre-diabetes 2023     Psoriatic arthritis (H)      RA (rheumatoid arthritis) (H)      Status post kidney transplant 06/06/2023    Induction with thymoglobulin 4mg/kg, + DSA CW9     Status post liver transplantation (H) 06/06/2023       Past Surgical History   I have reviewed this patient's surgical history and updated it with pertinent information if needed.  Past Surgical History:   Procedure Laterality Date     APPENDECTOMY      Removed at 18 Years Old      BENCH KIDNEY  06/06/2023    Procedure: Bench kidney;  Surgeon: Chad Clifton MD;  Location:  OR     [facility] LIVER  06/06/2023    Procedure: Bench liver;  Surgeon: Chad Clifton MD;  Location:  OR     COLONOSCOPY      2014 at [facility]      CV CORONARY ANGIOGRAM N/A 04/27/2023    Procedure: Coronary Angiogram;  Surgeon: Pablo Araujo MD;  Location:  HEART CARDIAC CATH LAB     EYE SURGERY Bilateral     Cataract     H STATISTIC PICC LINE INSERTION >5YR, FAILED Left 06/16/2023    Unable to advance catheter over the axillary area     HERNIA REPAIR      History of bilateral inguinal hernia repair: 10/28/2014. Open hernia repair: 10/2017. Abdominal wound exploration and debridement 12/27/2017     INSERT SHUNT PORTAL TRANSJUGULAR INTRAHEPTIC  09/26/2022     IR CVC NON TUNNEL LINE REMOVAL  7/3/2023     IR CVC NON TUNNEL PLACEMENT > 5 YRS  6/14/2023     IR CVC TUNNEL PLACEMENT > 5 YRS OF AGE  01/26/2023     IR PICC PLACEMENT > 5 YRS OF AGE  6/16/2023     IR RENAL BIOPSY RIGHT  6/20/2023     RETURN  LIVER TRANSPLANT N/A 2023    Procedure: Return liver transplant. Intra-operative ultrasound;  Surgeon: Chad Clifton MD;  Location: [facility] OR     TRANSPLANT KIDNEY RECIPIENT  DONOR N/A 2023    Procedure: Transplant kidney recipient  donor, ureteral stent placement;  Surgeon: Chad Clifton MD;  Location: [facility] OR     TRANSPLANT LIVER RECIPIENT  DONOR N/A 2023    Procedure: Transplant liver recipient  donor;  Surgeon: Chad Clifton MD;  Location: [facility] OR       Family History   I have reviewed this patient's family history and updated it with pertinent information if needed.   Family History   Problem Relation Age of Onset     Lung Cancer Mother      Colon Cancer Father      Lung Cancer Brother      Heart Failure Brother      Kidney Disease Brother        Social History   I have reviewed this patient's social history and updated it with pertinent information if needed. Damion Quinones  reports that he has never smoked. He has never been exposed to tobacco smoke. He has never used smokeless tobacco. He reports that he does not currently use alcohol. He reports that he does not currently use drugs.    Allergies   No Known Allergies  Prior to Admission Medications     [START ON 2023] allopurinol  300 mg Oral or Feeding Tube Daily     atorvastatin  40 mg Oral or Feeding Tube QPM     bisacodyl  10 mg Rectal Daily     [Auto Hold] fluconazole  200 mg Intravenous Q24H     gabapentin  100 mg Oral or Feeding Tube BID     gabapentin  200 mg Oral or Feeding Tube Daily     [START ON 2023] levothyroxine  88 mcg Oral or Feeding Tube Daily     [Held by provider] metoprolol tartrate  50 mg Oral BID     [Auto Hold] mycophenolate  750 mg Oral or NG Tube BID IS     [START ON 2023] pantoprazole  40 mg Oral QAM AC     [Auto Hold] piperacillin-tazobactam  3.375 g Intravenous Q6H     [START ON 2023] predniSONE  5 mg Oral or Feeding Tube Daily      senna-docusate  2 tablet Oral or Feeding Tube BID     [Auto Hold] sodium chloride (PF)  3 mL Intracatheter Q8H     [START ON 2023] sulfamethoxazole-trimethoprim  1 tablet Oral or Feeding Tube Daily     [Auto Hold] tacrolimus  0.5 mg Sublingual BID IS       amiodarone 0.5 mg/min (12/10/23 0700)     dextrose 5% and 0.45% NaCl + KCl 20 mEq/L       [Held by provider] heparin Stopped (12/10/23 0830)       Physical Exam   Temp  Av.9  F (36.6  C)  Min: 97.6  F (36.4  C)  Max: 98.2  F (36.8  C)      Pulse  Av.8  Min: 70  Max: 138 Resp  Av.2  Min: 12  Max: 31  SpO2  Av %  Min: 85 %  Max: 100 %     BP 93/66 (BP Location: Left arm)   Pulse 118   Temp 98.7  F (37.1  C) (Axillary)   Resp 24   SpO2 94%     Admit       GENERAL APPEARANCE: alert and mild distress  HENT: mouth without ulcers or lesions  RESP: clear, diminished at bases  CV: irregular rhythm  EDEMA: 2+ LE edema bilaterally  ABDOMEN: hard, distended, minimally tender, no bowel sounds  MS: extremities normal - no gross deformities noted, no evidence of inflammation in joints, no muscle tenderness  SKIN: no rash  PSYCH: mentation appears normal and affect normal/bright    Data   CMP  Recent Labs   Lab 12/10/23  0631 12/10/23  0233 23  2325 23  2152 23  1052    138  --  139 137   POTASSIUM 4.9 4.3  --  4.8 5.1   CHLORIDE 105 104  --  105 104   CO2 15* 21*  --  21* 21*   ANIONGAP 19* 13  --  13 12   GLC 94 113* 96 109* 122*   BUN 31.8* 30.2*  --  27.9* 25.7*   CR 1.85* 1.86*  --  1.76* 1.62*   GFRESTIMATED 40* 39*  --  42* 47*   NAZARIO 8.5* 8.2*  --  8.4* 8.5*   MAG 1.9 1.8  --  1.9 2.0   PHOS 3.9 3.3  --  3.5 3.1   PROTTOTAL 5.0* 4.9*  --  4.9* 4.7*   ALBUMIN 2.9* 3.3*  --  2.6* 2.8*   BILITOTAL 1.2 1.1  --  0.9 0.8   ALKPHOS 78 71  --  92 91   AST 49* 26  --  31 24   ALT 9 9  --  10 9     CBC  Recent Labs   Lab 12/10/23  0233 23  2152 23  1336 23  0554   HGB 13.3 15.4 15.4 17.7   WBC 5.7 6.2 5.2 5.0    RBC 4.63 5.31 5.33 6.01*   HCT 43.1 49.3 50.9 56.7*   MCV 93 93 96 94   MCH 28.7 29.0 28.9 29.5   MCHC 30.9* 31.2* 30.3* 31.2*   RDW 17.6* 17.9* 17.6* 18.2*    192 181 257     INRNo lab results found in last 7 days.  ABG  Recent Labs   Lab 12/09/23  2152 12/09/23  1314 12/09/23  1153   PH  --  7.38  --    PCO2  --  37  --    PO2  --  74*  --    HCO3  --  22  --    O2PER 6 5 5      Urine Studies  Recent Labs   Lab Test 12/09/23  1405 11/07/23  0753 09/27/23  1157 09/25/23  0759 09/20/23  1645 08/29/23  1023 08/04/23  0715   COLOR Yellow Yellow Dark Yellow* Yellow Yellow   < > Yellow   APPEARANCE Clear Clear Clear Clear Clear   < > Clear   URINEGLC Negative Negative 50* Negative 100*   < > Negative   URINEBILI Negative Small* Small* Small* Negative   < > Negative   URINEKETONE Negative Negative Negative Negative Negative   < > Negative   SG 1.022 1.015 1.029 1.020 1.015   < > 1.010   UBLD Negative Negative Negative Negative Negative   < > Negative   URINEPH 5.0 7.0 6.0 7.0 7.0   < > 6.5   PROTEIN 10* Trace* 70* 30* Negative   < > Negative   UROBILINOGEN  --  0.2  --  0.2 0.2  --  0.2   NITRITE Negative Negative Negative Negative Negative   < > Negative   LEUKEST Negative Trace* Negative Negative Negative   < > Small*   RBCU 2 0-2 1 None Seen  --   --  0-2   WBCU 6* 10-25* 7* None Seen  --   --  0-5    < > = values in this interval not displayed.     No lab results found.  PTH  Recent Labs   Lab Test 09/20/23  1611 08/01/23  0924 05/19/23  0956   PTHI 24 45 67*     Iron Studies  Recent Labs   Lab Test 11/21/23  0742 11/07/23  0753 10/05/23  0752 09/05/23  0702 08/07/23  0726 08/01/23  0924 11/22/22  0848   IRON 60* 53* 83 32* 28* 23* 68    226* 241 258 244 231* 219*   IRONSAT 22 23 34 12* 11* 10* 31   HOLA 204 341 919* 485* 440* 506* 429*       IMAGING:  All imaging studies reviewed by me.

## 2023-12-10 NOTE — PROGRESS NOTES
Patient seen and examined.  He continues to have abdominal pain.  Not passed stool or flatus.  He has a nasogastric tube.  On examination:  On afebrile blood pressure somewhat improved.  He was on pressors last night.  The abdomen is tender     Reviewed the laboratory values.  Procalcitonin is significantly elevated.    Intra-abdominal sepsis cannot be ruled out.  I explained the above clinical details to the patient and family and told them that we will be going to the operating room open incision, remove the mesh to abdominal washout and examined the bowel as well.    Risks benefits alternatives of the procedure explained.

## 2023-12-10 NOTE — ANESTHESIA PROCEDURE NOTES
Central Line/PA Catheter Placement    Pre-Procedure   Staff -        Anesthesiologist:  Joslyn Alexander MD       Performed By: anesthesiologist       Location: OR       Pre-Anesthestic Checklist: patient identified, IV checked, site marked, risks and benefits discussed, informed consent, monitors and equipment checked, pre-op evaluation and at physician/surgeon's request  Timeout:       Correct Patient: Yes        Correct Procedure: Yes        Correct Site: Yes        Correct Position: Yes        Correct Laterality: Yes   Line Placement:   This line was placed Post Induction starting at 12/10/2023 10:47 PM and ending at 12/10/2023 12:47 PM    Procedure   Procedure: central line       Laterality: left       Insertion Site: femoral.       Patient Position: supine  Sterile Prep        All elements of maximal sterile barrier technique followed       Patient Prep/Sterile Barriers: draped, hand hygiene, gloves , hat , mask , draped, gown, sterile gel and probe cover       Skin prep: Chloraprep  Insertion/Injection        Technique: ultrasound guided        1. Ultrasound was used to evaluate the access site.       2. Vein evaluated via ultrasound for patency/adequacy.       3. Using real-time ultrasound the needle/catheter was observed entering the artery/vein.       Type: Introducer  Narrative         Secured by: suture       Tegaderm dressing used.       Complications: None apparent,        blood aspirated from all lumens,        All lumens flushed: Yes       Verification method: Ultrasound   Comments:  Multiple attempts at placing an internal jugular central line were attempted by myself and Dr. Herzog.   There was an attempt to place left subclavian as well. Every time the needle enter the vein good blood flow was noted. It was not possible to thread the wire. Thrombus was noted in both IJV. It was attempted to go distal the IJV on the right side. We were not able to thread the guide wire from distal IJV approach.      Finally after struggling through the entire surgery to secure IJV access, Dr. Clifton and I attempted a left femoral access and were able to secure CVL.

## 2023-12-10 NOTE — ANESTHESIA CARE TRANSFER NOTE
Patient: Damion Quinones    Procedure: Procedure(s):  Umbilical wound exploration, incision and drainage of abcess, exploratory laparotomy, mesh excision, small bowel primary repair       Diagnosis: Umbilical hernia [K42.9]  Diagnosis Additional Information: No value filed.    Anesthesia Type:   No value filed.     Note:    Oropharynx: endotracheal tube in place  Level of Consciousness: iatrogenic sedation      Independent Airway: airway patency not satisfactory and stable  Dentition: dentition unchanged  Vital Signs Stable: post-procedure vital signs reviewed and stable  Report to RN Given: handoff report given  Patient transferred to: ICU    ICU Handoff: Call for PAUSE to initiate/utilize ICU HANDOFF, Identified Patient, Identified Responsible Provider, Reviewed the Pertinent Medical History, Discussed Surgical Course, Reviewed Intra-OP Anesthesia Management and Issues during Anesthesia, Set Expectations for Post Procedure Period and Allowed Opportunity for Questions and Acknowledgement of Understanding  Vitals:  Vitals Value Taken Time   BP     Temp     Pulse     Resp     SpO2         Electronically Signed By: JADE Bravo CRNA  December 10, 2023  12:58 PM

## 2023-12-10 NOTE — PROGRESS NOTES
Transplant Surgery  Inpatient Daily Progress Note  12/10/2023    Assessment & Plan: Damion Quinones is a 68 year old male with a history of PAF, obesity, HTN, pre-diabetes, rheumatoid and psoriatic arthritis, CAD, and cirrhosis (alcohol + hemochromatosis) and 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 He underwent a redo mesh repair of umbilical hernia and minimal DHEERAJ of small bowel 12/7/23 with Dr. Clifton. He now presents from home with severe LLQ abdominal pain and N/V.     s/p Redo mesh repair of umbilical hernia and minimal DHEERAJ of small bowel: POD#3. Nausea with vomiting, abdominal distention, and no return of bowel function yet. NGT placed with improvement in nausea and abdominal pain. Unclear origin of sepsis.    - AXR: No e/o obstruction   - CT scan 12/8 and 12/9 no obstruction or evidence of perforated bowel   - PRN analgesics/antiemetics   - NPO status with NGT to LIS     Hx SLK 6/6/23:  Liver: LFTs WNL.   Kidney: Cr  baseline ~0.8-1. Low UO on admission. IVF resuscitation. Lux placed to monitor output.   PATI secondary to sepsis/SIRS: Cr now increased 1.9. Monitor UO.      Immunosuppressed status secondary to medications:   Maintenance:    - Myfortic 540 mg BID -> switch to  mg susp   - Tacrolimus 1.5 mg BID. Goal level 6-8. 11/21 level 11.4. Dose reduced to 1.5 mg BID. No repeat level. Diflucan started d/t concern for infection. Start tac SL due to concern for absorption and reduce dose due to diflucan. Tac 0.5 SL BID. Check level today   - Prednisone 5 mg daily     Neuro:  Peripheral neuropathy: Follows with Neurology. PTA gabapentin, capsaicin cream.   Acute post-op pain: LLQ abdominal pain.    - PRN IV Dilaudid while NPO     Hematology:   Hemo concentrated Hgb of 17.1 on 12/8. Hgb 13.3, stable  Chronic anticoagulation for Afib and DVT/PE ppx: PTA apixaban. Held starting 12/3 prior to procedure, not restarted post procedure. Heparin gtt low intensity started 12/9.       Cardiorespiratory:   Hx CAD: PTA atorvastatin.   PAF with RVR and hypotension: PTA metoprolol 50 mg BID. PTA apixaban on HOLD for now. Hold metoprolol due to hypotension. Cardiology consulted for recommendations for rate control as unable to give metoprolol. Digoxin 500 mcg x 1 dose for rate control per cardiology initial recommendation. BNP WNL. ECHO technically difficult study; EF 55-60%. Replaced volume. Received Digoxin 250 mcg x 1 dose. Now on amiodarone gtt.   Hypotension: Refractory to volume resuscitation. Lactic acid up to 5.6. Transferred to SICU for pressor support. On 1 pressor, minimal dose. Lactic acid now decreased after starting pressor. Continue to monitor LA.  Respiratory insufficiency: Secondary to pulmonary edema: 12/9 CXR pulm edema and atelectasis in the setting of low lung volumes. Stable on NC 5L->3L. Titrate oxygen as tolerated. Monitor closely with fluid resuscitation. Start IS- discussed with patient and family. 12/9 PM CXR new LL consolidation/atelectasis.   Tachycardia: Concern for PE/DVT given surgery and history of provoked DVT/PE. PTA apixaban held. Start heparin gtt (low intensity d/t recent surgery). BLE US negative for DVT. No Chest CT PE d/t not wanting to give contrast at this time.      GI/Nutrition: NPO due to N/V as above. NGT to LIS. Dulcolax supp     Endocrine:   Hypothyroidism: PTA levothyroxine.     Fluid/Electrolytes:   PATI: see above  Hypervolemia with intervascular hypovolemia: Fluid resuscitation with 2250ml crystalloid plus MIV on admission/overnight. Received 3L crystalloid 12/9.  MIV LR 50 ml/hr. Monitor respiratory status  Hypomagnesemia: PTA Mag-Ox 800 mg BID. Mg 2g IV on 12/9     : No issues.      Infectious disease: afebrile. No leukocytosis.   Sepsis vs SIRS: Elevated lactic acid. Received antibiotics and IVFs. CT scan abd/pelvis with no bowel leak/perforation (2 scans). BC and UC. Continued empiric antibiotics.        Rheum:  Hx Gout: Follows with  Rheumatology. PTA allopurinol, prednisone, PRN anakinra.      Prophylaxis: DVT (mechanical while apixaban on hold), fall, GI (PPI), pneumocystis (Bactrim), allison-op (Cipro)     Disposition: Transfer to SICU    GEORGE/Fellow/Resident Provider: Shannon Delacruz PA-C     Faculty: Jhon White MD    __________________________________________________________________  Transplant History: Admitted 12/8/2023 for abdominal pain.   6/6/2023 (Liver), 6/6/2023 (Kidney), Postoperative day: 187 (Liver), 187 (Kidney)     Interval History: History is obtained from the patient  Overnight events: Transfer to ICU for hypotension requiring pressors.     ROS:   A 10-point review of systems was negative except as noted above.    Curent Meds:   allopurinol  300 mg Oral Daily    atorvastatin  40 mg Oral QPM    [Held by provider] ciprofloxacin  500 mg Oral BID    fluconazole  400 mg Intravenous Q24H    gabapentin  100 mg Oral BID    gabapentin  200 mg Oral Daily    levothyroxine  88 mcg Oral Daily    magnesium oxide  800 mg Oral BID    [Held by provider] metoprolol tartrate  50 mg Oral BID    mycophenolate  750 mg Oral or NG Tube BID IS    [Held by provider] mycophenolate mofetil  750 mg Intravenous BID IS    [Held by provider] mycophenolic acid  540 mg Oral BID IS    pantoprazole  40 mg Oral QAM AC    piperacillin-tazobactam  3.375 g Intravenous Q6H    predniSONE  5 mg Oral Daily    senna-docusate  1 tablet Oral At Bedtime    sodium chloride (PF)  3 mL Intracatheter Q8H    sulfamethoxazole-trimethoprim  1 tablet Oral Daily    tacrolimus  0.5 mg Sublingual BID IS    vancomycin  1,500 mg Intravenous Q18H       Physical Exam:     Admit      Current Vitals:   /63   Pulse 101   Temp 100.1  F (37.8  C) (Axillary)   Resp 21   SpO2 95%          Vital sign ranges:    Temp:  [97.4  F (36.3  C)-100.1  F (37.8  C)] 100.1  F (37.8  C)  Pulse:  [] 101  Resp:  [9-36] 21  BP: ()/(35-87) 103/63  SpO2:  [89 %-100 %] 95 %  Patient  Vitals for the past 24 hrs:   BP Temp Temp src Pulse Resp SpO2   12/10/23 0645 103/63 -- -- -- -- --   12/10/23 0630 93/67 -- -- -- -- --   12/10/23 0615 99/71 -- -- -- -- --   12/10/23 0600 102/75 -- -- 101 21 95 %   12/10/23 0545 93/67 -- -- -- -- --   12/10/23 0530 (!) 89/75 -- -- -- -- --   12/10/23 0515 99/71 -- -- -- -- 97 %   12/10/23 0500 99/71 -- -- 119 18 --   12/10/23 0450 113/63 -- -- -- -- --   12/10/23 0445 (!) 72/59 -- -- -- -- --   12/10/23 0430 96/69 -- -- -- -- --   12/10/23 0415 95/78 -- -- -- -- --   12/10/23 0400 104/78 100.1  F (37.8  C) Axillary 97 18 100 %   12/10/23 0345 105/74 -- -- -- -- --   12/10/23 0330 107/75 -- -- -- -- --   12/10/23 0315 100/70 -- -- -- -- --   12/10/23 0300 99/71 -- -- 115 16 94 %   12/10/23 0245 103/77 -- -- -- -- --   12/10/23 0230 102/81 -- -- -- -- --   12/10/23 0215 104/74 -- -- -- -- --   12/10/23 0200 104/74 -- -- 109 19 97 %   12/10/23 0145 116/85 -- -- (!) 124 19 96 %   12/10/23 0130 106/67 -- -- (!) 129 19 92 %   12/10/23 0120 111/73 -- -- -- -- --   12/10/23 0115 106/64 -- -- 118 21 92 %   12/10/23 0110 100/66 -- -- 118 18 91 %   12/10/23 0105 111/75 -- -- (!) 124 19 93 %   12/10/23 0100 102/79 -- -- (!) 126 19 93 %   12/10/23 0045 93/76 -- -- 117 21 97 %   12/10/23 0040 102/72 -- -- -- -- --   12/10/23 0030 116/82 -- -- 116 27 97 %   12/10/23 0020 112/71 -- -- -- -- --   12/10/23 0015 107/80 -- -- 110 19 --   12/10/23 0000 111/72 100  F (37.8  C) Oral 111 21 97 %   12/09/23 2355 121/71 -- -- 120 19 98 %   12/09/23 2354 121/71 -- -- 118 (!) 36 98 %   12/09/23 2350 119/76 -- -- 115 22 --   12/09/23 2345 114/82 -- -- 115 18 100 %   12/09/23 2300 (!) 88/61 -- -- (!) 125 24 --   12/09/23 2250 (!) 58/41 -- -- (!) 131 25 --   12/09/23 2245 (!) 73/55 -- -- (!) 127 26 96 %   12/09/23 2240 (!) 83/42 -- -- (!) 123 24 --   12/09/23 2230 (!) 57/35 -- -- (!) 131 22 --   12/09/23 2220 (!) 76/54 -- -- (!) 125 23 96 %   12/09/23 2210 (!) 87/52 -- -- (!) 138 24 --    12/09/23 2200 95/76 98.2  F (36.8  C) Oral (!) 131 29 90 %   12/09/23 2150 97/55 -- -- (!) 133 12 --   12/09/23 2140 (!) 80/57 -- -- (!) 135 21 --   12/09/23 2130 (!) 88/57 -- -- (!) 137 23 --   12/09/23 2120 96/63 -- -- (!) 122 25 --   12/09/23 2110 (!) 82/63 -- -- (!) 124 19 --   12/09/23 2100 (!) 81/65 -- -- (!) 130 20 --   12/09/23 2050 (!) 81/64 -- -- (!) 124 21 --   12/09/23 2040 91/72 -- -- (!) 126 21 --   12/09/23 2030 112/73 -- -- (!) 136 22 93 %   12/09/23 2020 105/87 -- -- (!) 131 (!) 32 (!) 89 %   12/09/23 2010 115/79 -- -- (!) 128 16 --   12/09/23 2000 (!) 81/55 100  F (37.8  C) Oral (!) 124 25 96 %   12/09/23 1950 (!) 78/43 -- -- (!) 126 24 --   12/09/23 1940 110/66 -- -- (!) 129 23 --   12/09/23 1930 99/69 -- -- (!) 131 20 --   12/09/23 1920 96/62 -- -- (!) 131 21 --   12/09/23 1913 -- 97.5  F (36.4  C) Oral (!) 121 -- --   12/09/23 1910 112/73 -- -- 114 21 --   12/09/23 1900 101/65 -- -- (!) 123 23 --   12/09/23 1841 92/66 -- -- (!) 130 24 92 %   12/09/23 1830 99/69 98.5  F (36.9  C) Oral (!) 121 22 94 %   12/09/23 1820 96/80 -- -- (!) 124 21 --   12/09/23 1810 -- -- -- 120 23 96 %   12/09/23 1800 (!) 88/64 -- -- 109 26 96 %   12/09/23 1700 91/55 -- -- 120 27 98 %   12/09/23 1515 102/73 -- -- 111 20 100 %   12/09/23 1500 92/75 -- -- 118 21 98 %   12/09/23 1445 94/78 -- -- 113 26 94 %   12/09/23 1430 103/77 -- -- 108 20 99 %   12/09/23 1415 93/71 -- -- 113 22 96 %   12/09/23 1400 91/70 -- -- 119 24 96 %   12/09/23 1345 92/74 -- -- 107 23 98 %   12/09/23 1330 94/65 -- -- 109 21 98 %   12/09/23 1315 104/70 -- -- 117 25 99 %   12/09/23 1300 105/68 97.4  F (36.3  C) Oral 110 25 97 %   12/09/23 1045 95/74 -- -- 119 22 96 %   12/09/23 1031 (!) 86/62 -- -- (!) 131 19 97 %   12/09/23 1030 (!) 80/70 97.6  F (36.4  C) Oral 118 12 97 %   12/09/23 1020 (!) 80/60 -- -- (!) 134 28 95 %   12/09/23 1015 -- -- -- -- -- 94 %   12/09/23 0930 (!) 83/59 -- -- 116 26 95 %   12/09/23 0915 -- -- -- (!) 129 18 96 %  "  12/09/23 0900 -- -- -- (!) 144 16 97 %   12/09/23 0845 -- -- -- (!) 147 30 96 %   12/09/23 0830 107/68 -- -- (!) 131 24 99 %   12/09/23 0815 -- -- -- (!) 137 12 94 %   12/09/23 0800 (!) 89/64 -- -- (!) 128 (!) 9 97 %   12/09/23 0745 -- -- -- (!) 128 15 97 %   12/09/23 0740 95/71 -- -- 117 24 99 %   12/09/23 0730 (!) 84/54 98.2  F (36.8  C) -- (!) 123 18 100 %   12/09/23 0715 -- -- -- (!) 129 (!) 33 98 %       /63   Pulse 101   Temp 100.1  F (37.8  C) (Axillary)   Resp 21   SpO2 95%   General Appearance: resting comfortably in bed  Respiratory: BCTA. On 3L NC  Cardiovascular: Afib RVR  GI: abdomen soft, diffusely tender to palpation, abdominal distention. No rebound or guarding Umbilical hernia repair incision C/D/I, open to air.   Genitourinary: no Lux present  Skin: warm, dry  Musculoskeletal: BLE pedal edema  Neurologic: A&OX4. No confusion  Psychiatric: calm, behavior appropriate to situation    Frailty Scores          12/27/2022 11/22/2022   Frailty Scores   Final Score Frail Frail   Final Score Number 3 4       Data:   CMP  Recent Labs   Lab 12/10/23  0233 12/09/23  2325 12/09/23  2152 12/09/23  0159 12/08/23 2002     --  139   < > 139   POTASSIUM 4.3  --  4.8   < > 4.2   CHLORIDE 104  --  105   < >  --    CO2 21*  --  21*   < >  --    * 96 109*   < > 165*   BUN 30.2*  --  27.9*   < >  --    CR 1.86*  --  1.76*   < >  --    GFRESTIMATED 39*  --  42*   < >  --    NAZARIO 8.2*  --  8.4*   < >  --    ICAW  --   --   --   --  4.9   MAG 1.8  --  1.9   < >  --    PHOS 3.3  --  3.5   < >  --    ALBUMIN 3.3*  --  2.6*   < >  --    BILITOTAL 1.1  --  0.9   < >  --    ALKPHOS 71  --  92   < >  --    AST 26  --  31   < >  --    ALT 9  --  10   < >  --     < > = values in this interval not displayed.     CBC  Recent Labs   Lab 12/10/23  0233 12/09/23  2152   HGB 13.3 15.4   WBC 5.7 6.2    192     COAGSNo lab results found in last 7 days.    Invalid input(s): \"XA\"   Urinalysis  Recent Labs "   Lab Test 12/09/23  1405 11/07/23  0753   COLOR Yellow Yellow   APPEARANCE Clear Clear   URINEGLC Negative Negative   URINEBILI Negative Small*   URINEKETONE Negative Negative   SG 1.022 1.015   UBLD Negative Negative   URINEPH 5.0 7.0   PROTEIN 10* Trace*   NITRITE Negative Negative   LEUKEST Negative Trace*   RBCU 2 0-2   WBCU 6* 10-25*     Virology:  Hepatitis C Antibody   Date Value Ref Range Status   06/05/2023 Nonreactive Nonreactive Final     BK Virus DNA copies/mL   Date Value Ref Range Status   10/05/2023 Not Detected Not Detected copies/mL Final   09/05/2023 Not Detected Not Detected copies/mL Final   08/29/2023 Not Detected Not Detected copies/mL Final   08/21/2023 Not Detected Not Detected copies/mL Final   08/17/2023 Not Detected Not Detected copies/mL Final   08/01/2023 Not Detected Not Detected copies/mL Final   07/31/2023 Not Detected Not Detected copies/mL Final

## 2023-12-10 NOTE — PROGRESS NOTES
Pt arrived back from the OR intubated, initial vent settings:  CMV 16/500/+7/65%  ETT 25@marga Khoury, RT

## 2023-12-10 NOTE — PLAN OF CARE
End of Shift Summary.  For vital signs and complete assessments, please see documentation flowsheets.     Pertinent Assessments/Changes: Initially hypotensive and tachycardic when arrived to unit. Currently in Afib, RVR. Over course of shift patient received 2 LR boluses and 1 NS bolus with improvement in tachycardia and BP. Trending lactates, most recent 4.3. Weaned from 5 to 3L nasal cannula. Hep gtt at 1200 units/hr. LR at 125cc/hr. Abdomen distended, tender and warm to touch, pt feels that BLE edema and abdominal distension are worse this evening. No flatus, suppository given.  Doppler for BLE pedal pulses. Lux placed for strict I/O. Productive cough. Up with Ax2 when OOB.        Plan (Upcoming Events) Repeat BMP, Xa at 2000. Lactic to be redrawn at 2100. If any hypotension per provider could consider albumin.

## 2023-12-10 NOTE — PROGRESS NOTES
Post op plan: keep patient NPO and TPN for one week, upper GI small bowel study and if that shows no  leak, start po diet

## 2023-12-10 NOTE — OP NOTE
Transplant Surgery  Operative Note    Preop Dx: Suspected peritonitis, post umbilical hernia repair, post combined liver kidney transplant  Postop Dx: Small bowel perforation with generalized peritonitis  Procedure: #1 exploratory laparotomy #2 primary repair of the small bowel perforation at 2 L #3 abdominal washout  Surgeon: Chad Clifton M.D.  ASSISTANT: Dr. Chris Verma assisted the entire procedure . There was no qualified general surgery resident available to assist during this procedure.   Anesthesia: General  EBL: 100 mL ml  Fluids: 2 L  UO: Few mL few mL  Drains: French-Mar drain, 3 drains placed the drains on the right side inferior wound close to the pelvis the superior one close to the splenic bed, the right one close to the right of attic space.  Specimen: Pus for cultures.  Complications: None  Findings:    #1.  Generalized peritonitis with bilious fluid collection in the pelvis left upper quadrant as well as the right subhepatic space.  #2.  Site of perforation was the mid small bowel, 1 x 1 cm perforation appeared to be a serosal tear which had leaked.  The patient had a previous umbilical hernia with mesh repair and this loop of bowel was stuck to the previous mesh.  Complications: None.    Indication: The patient has generalized peritonitis after discussing the risks and benefits of surgery and potential complications, the patient provided informed consent.     DETAILS OF PROCEDURE:  The patient was brought to the operating room, placed in a supine position.  Perioperative prophylactic IV antibiotics were given.  Anesthesia was adminisitered. The parts was prepped and draped in the usual sterile fashion.  Time out was performed.  We opened the abdomen by opening the previous umbilical hernia repair.  In the subcutaneous space there was close to 50 mill of fluid we sent it for culture.  Upon opening the abdomen we found generalized peritonitis with bile-stained fluid.  We sucked out  all the fluid up on examining the small bowel there was 1 into 1 cm perforation on the lateral wall of the bowel.  We trimmed the edges of the bowel perforation.  We closed in 2 layers inner 3-0 PDS continuous and outer interrupted 3-0 silk.  We closed transversely so that there was no compromise to the bowel lumen.  We then spent considerable amount of time sectioning all the peritonitis fluid on the left side inferiorly and right side we irrigated the abdomen with close to 15 L of warm saline.  After the irrigation the returns were pretty clear.  We then placed 3 drains 1 in the pelvis 1 in the splenic bed and 1 in the subhepatic space.  The fascia was approximated with interrupted #1 PDS the skin was approximated with 2-0 nylon interrupted.  We placed a small drain in the subcutaneous space as well    The patient was on 1 pressor 0.5 of norepinephrine.    All needle, sponge, and instrument counts were accurate.  The patient tolerated the procedure well without apparent complications and was trasfered to the SICU in critical but stable condition.  We explained the details of the procedure the family and answered all the questions..  Faculty was present for critical portions of the procedure.

## 2023-12-10 NOTE — PROGRESS NOTES
Brief cardiology progress note    Overnight patient became hypotensive with MAPs in the 50s range associated with increasing lactate. Had to be transferred to the ICU for further care. He continued to have atrial fibrillation with HR in the 120-130. BB were stopped appropriately and amiodarone was started. Since then, his HR have been somewhat better controlled close to the 100s. Even though, wbc and temperature have remained normal his procal is highly elevated suggesting infectious process leading to possibly septic shock. No signs of cardiogenic shock with preserved EF on echo and fairly well controlled HR. I spoke the primary team who are planning to take the patient to the OR this morning.     At this point his atrial fibrillation appear to be well controlled. We will continue monitoring peripherally. Please call us if questions.     Jorge Reyes Castro, MD, MS  Cardiovascular disease fellow

## 2023-12-10 NOTE — ANESTHESIA PROCEDURE NOTES
Arterial Line Procedure Note    Pre-Procedure   Staff -        Anesthesiologist:  Joslyn Alexander MD       Performed By: anesthesiologist       Location: OR       Pre-Anesthestic Checklist: patient identified, IV checked, risks and benefits discussed, informed consent, monitors and equipment checked, pre-op evaluation and at physician/surgeon's request  Timeout:       Correct Patient: Yes        Correct Procedure: Yes        Correct Site: Yes        Correct Position: Yes   Line Placement:   This line was placed Post Induction starting at 12/10/2023 9:25 AM and ending at 12/10/2023 10:25 AM  Procedure   Procedure: arterial line       Laterality: right       Insertion Site: radial.  Sterile Prep        Standard elements of sterile barrier followed       Skin prep: Chloraprep  Insertion/Injection        Technique: ultrasound guided        1. Ultrasound was used to evaluate the access site.       2. Artery evaluated via ultrasound for patency/adequacy.       3. Using real-time ultrasound the needle/catheter was observed entering the artery/vein.       Catheter Type/Size: 20 G, 12 cm  Narrative         Secured by: suture       Tegaderm dressing used.       Complications: None apparent,        Arterial waveform: Yes    Comments:  Multiple attempts at A line cannulation in the left radial and right distal radial. Unable to get wire threaded.

## 2023-12-10 NOTE — ANESTHESIA PROCEDURE NOTES
Airway       Patient location during procedure: OR       Procedure Start/Stop Times: 12/10/2023 9:21 AM  Staff -        CRNA: Rebeca Chaparro APRN CRNA       Performed By: CRNA  Consent for Airway        Urgency: elective  Indications and Patient Condition       Indications for airway management: allison-procedural       Induction type:intravenous       Mask difficulty assessment: 1 - vent by mask    Final Airway Details       Final airway type: endotracheal airway       Successful airway: ETT - single  Endotracheal Airway Details        ETT size (mm): 8.0       Cuffed: yes       Successful intubation technique: video laryngoscopy       VL Blade Size: Glidescope 4       Grade View of Cords: 1       Adjucts: stylet       Position: Right       Measured from: lips       Secured at (cm): 23       Bite block used: None    Post intubation assessment        Placement verified by: capnometry, equal breath sounds and chest rise        Number of attempts at approach: 1       Secured with: pink tape       Ease of procedure: easy       Dentition: Intact and Unchanged    Medication(s) Administered   Medication Administration Time: 12/10/2023 9:21 AM

## 2023-12-10 NOTE — ANESTHESIA POSTPROCEDURE EVALUATION
Patient: Damion Quinones    Procedure: Procedure(s):  Umbilical wound exploration, incision and drainage of abcess, exploratory laparotomy, mesh excision, small bowel primary repair       Anesthesia Type:  General    Note:  Disposition: Inpatient   Postop Pain Control: Uneventful            Sign Out: Well controlled pain   PONV: No   Neuro/Psych: Uneventful            Sign Out: Acceptable/Baseline neuro status   Airway/Respiratory: Uneventful            Sign Out: AIRWAY IN SITU/Resp. Support   CV/Hemodynamics: Uneventful            Sign Out: Acceptable CV status; No obvious hypovolemia; No obvious fluid overload   Other NRE:    DID A NON-ROUTINE EVENT OCCUR?            Last vitals:  Vitals:    12/10/23 1330 12/10/23 1345 12/10/23 1400   BP:      Pulse: 101 97 100   Resp: 16 16 16   Temp:  36  C (96.8  F)    SpO2:  97% 97%       Electronically Signed By: Joslyn Alexander MD  December 10, 2023  2:06 PM

## 2023-12-10 NOTE — ANESTHESIA PREPROCEDURE EVALUATION
Anesthesia Pre-Procedure Evaluation    Patient: Damion Quinones   MRN: [id number] : 1955        Procedure : Procedure(s):  Umbilical wound exploration          Past Medical History:   Diagnosis Date     Alcoholic cirrhosis of liver with ascites (H) 10/11/2019     ANCA-associated vasculitis (H)      Antiplatelet or antithrombotic long-term use      C. difficile colitis      Coronary artery disease     [facility] 2023 - complete occlusion of RCA     ESRD (end stage renal disease) on dialysis (H)      Gout      HCC (hepatocellular carcinoma) (H) 2023     History of hemochromatosis 10/11/2019     Hypertension      IgA nephropathy      Obesity      PAF (paroxysmal atrial fibrillation) (H)      Pre-diabetes      Psoriatic arthritis (H)      RA (rheumatoid arthritis) (H)      Status post kidney transplant 2023    Induction with thymoglobulin 4mg/kg, + DSA CW9     Status post liver transplantation (H) 2023      Past Surgical History:   Procedure Laterality Date     APPENDECTOMY      Removed at 18 Years Old      BENCH KIDNEY  2023    Procedure: Bench kidney;  Surgeon: Chad Clifton MD;  Location:  OR     BENCH LIVER  2023    Procedure: Bench liver;  Surgeon: Chad Clifton MD;  Location:  OR     COLONOSCOPY      2014 at [facility]      CV CORONARY ANGIOGRAM N/A 2023    Procedure: Coronary Angiogram;  Surgeon: Pablo Araujo MD;  Location:  HEART CARDIAC CATH LAB     EYE SURGERY Bilateral     Cataract     H STATISTIC PICC LINE INSERTION >5YR, FAILED Left 2023    Unable to advance catheter over the axillary area     HERNIA REPAIR      History of bilateral inguinal hernia repair: 10/28/2014. Open hernia repair: 10/2017. Abdominal wound exploration and debridement 2017     INSERT SHUNT PORTAL TRANSJUGULAR INTRAHEPTIC  2022     IR CVC NON TUNNEL LINE REMOVAL  7/3/2023     IR CVC NON TUNNEL PLACEMENT > 5 YRS  2023     IR  CVC TUNNEL PLACEMENT > 5 YRS OF AGE  2023     IR PICC PLACEMENT > 5 YRS OF AGE  2023     IR RENAL BIOPSY RIGHT  2023     RETURN LIVER TRANSPLANT N/A 2023    Procedure: Return liver transplant. Intra-operative ultrasound;  Surgeon: Chad Clifton MD;  Location: [facility] OR     TRANSPLANT KIDNEY RECIPIENT  DONOR N/A 2023    Procedure: Transplant kidney recipient  donor, ureteral stent placement;  Surgeon: Chad Clifton MD;  Location: [facility] OR     TRANSPLANT LIVER RECIPIENT  DONOR N/A 2023    Procedure: Transplant liver recipient  donor;  Surgeon: Chad Clifton MD;  Location: [facility] OR      No Known Allergies   Social History     Tobacco Use     Smoking status: Never     Passive exposure: Never     Smokeless tobacco: Never   Substance Use Topics     Alcohol use: Not Currently     Comment: Last ETOH use was 2021      Wt Readings from Last 1 Encounters:   23 106.8 kg (235 lb 7.2 oz)        Anesthesia Evaluation   Pt has had prior anesthetic.         ROS/MED HX  ENT/Pulmonary:       Neurologic:       Cardiovascular:     (+) hypertension--CAD ---Taking blood thinners     METS/Exercise Tolerance:     Hematologic:       Musculoskeletal:       GI/Hepatic:     (+) liver disease,     Renal/Genitourinary:     (+) renal disease,     Endo:     (+) Obesity,     Psychiatric/Substance Use:       Infectious Disease:       Malignancy:       Other:            Physical Exam    Airway        Mallampati: II   TM distance: > 3 FB    Mouth opening: > 3 cm    Respiratory Devices and Support    CPAP:      Dental           Cardiovascular             Pulmonary                   OUTSIDE LABS:  CBC:   Lab Results   Component Value Date    WBC 5.7 12/10/2023    WBC 6.2 2023    HGB 13.3 12/10/2023    HGB 15.4 2023    HCT 43.1 12/10/2023    HCT 49.3 2023     12/10/2023     2023     BMP:   Lab Results   Component Value Date     " 12/10/2023     12/10/2023    POTASSIUM 4.9 12/10/2023    POTASSIUM 4.3 12/10/2023    CHLORIDE 105 12/10/2023    CHLORIDE 104 12/10/2023    CO2 15 (L) 12/10/2023    CO2 21 (L) 12/10/2023    BUN 31.8 (H) 12/10/2023    BUN 30.2 (H) 12/10/2023    CR 1.85 (H) 12/10/2023    CR 1.86 (H) 12/10/2023    GLC 94 12/10/2023     (H) 12/10/2023     COAGS:   Lab Results   Component Value Date    PTT 46 (H) 06/18/2023    INR 1.40 (H) 11/22/2023    FIBR 926 (H) 06/20/2023     POC: No results found for: \"BGM\", \"HCG\", \"HCGS\"  HEPATIC:   Lab Results   Component Value Date    ALBUMIN 2.9 (L) 12/10/2023    PROTTOTAL 5.0 (L) 12/10/2023    ALT 9 12/10/2023    AST 49 (H) 12/10/2023     (H) 08/04/2023    ALKPHOS 78 12/10/2023    BILITOTAL 1.2 12/10/2023    DEBRA 18 06/12/2023     OTHER:   Lab Results   Component Value Date    PH 7.38 12/09/2023    LACT 2.1 (H) 12/10/2023    A1C 5.2 09/20/2023    NAZARIO 8.5 (L) 12/10/2023    PHOS 3.9 12/10/2023    MAG 1.9 12/10/2023    LIPASE 59 06/07/2023    AMYLASE 90 06/07/2023    TSH 0.73 09/20/2023    T4 0.76 (L) 09/05/2023    SED 66 (H) 09/29/2023       Anesthesia Plan    ASA Status:  4, emergent       Anesthesia Type: General.     - Airway: ETT         Techniques and Equipment:     - Lines/Monitors: 2nd IV, Central Line     Consents    Anesthesia Plan(s) and associated risks, benefits, and realistic alternatives discussed. Questions answered and patient/representative(s) expressed understanding.     - Discussed: Risks, Benefits and Alternatives for BOTH SEDATION and the PROCEDURE were discussed     - Discussed with:  Patient, Implied consent/emergency         Postoperative Care    Pain management: IV analgesics.   PONV prophylaxis: Ondansetron (or other 5HT-3)     Comments:               Joslyn Alexander MD    I have reviewed the pertinent notes and labs in the chart from the past 30 days and (re)examined the patient.  Any updates or changes from those notes are reflected in this " note.

## 2023-12-10 NOTE — PLAN OF CARE
Major Shift Events: Pt lethargic but oriented x4, follows commands and BUNN. Denies pain. Map goal >65, currently on 0.01 levo. Afebrile. Cuff pressures stable. A-fib, on amio drip at 0.5. HR in the one teens. On biPAP to maintain optimal blood gases. NG to LIS. Hypoactive bowel sounds. Passing gas, last BM 12/8. Abdomen warm and tender to touch, umbilical bulge blanchable. Heparin running at 1800, low intensity, next check at 1200.     Plan: Continue to monitor BP and goals of care.     For vital signs and complete assessments, please see documentation flowsheets.

## 2023-12-10 NOTE — PROGRESS NOTES
At shift start pts HR was 120s map 60-70. Abdomen distended, warm, and tender to touch. Insufficient UOP, Cool extremities, afebrile. MD to bedside at 2023. Ordered CXR, labs, and amiodarone. Lactic came back at 5.6 and called sepsis code, hospitalist arrived. Amiodarone started at 2140. MAPS became hypotensive (maps 40-60). Amio stopped and albumin started at 2230. Levophed started at 2245. Patient transferred to  at 2300.       Transfer  Transferred to:   Via:bed  Reason for transfer: Pt inappropriate for 6C- worsening patient condition  Family: at bedside  Belongings: Sent with pt  Chart: Sent with pt  Medications: Meds from bin sent with pt  Report given in person to: Charito

## 2023-12-11 ENCOUNTER — PATIENT OUTREACH (OUTPATIENT)
Dept: CARE COORDINATION | Facility: CLINIC | Age: 68
End: 2023-12-11
Payer: MEDICARE

## 2023-12-11 ENCOUNTER — APPOINTMENT (OUTPATIENT)
Dept: ULTRASOUND IMAGING | Facility: CLINIC | Age: 68
End: 2023-12-11
Payer: COMMERCIAL

## 2023-12-11 LAB
ALBUMIN SERPL BCG-MCNC: 2.7 G/DL (ref 3.5–5.2)
ALLEN'S TEST: NO
ALP SERPL-CCNC: 59 U/L (ref 40–150)
ALT SERPL W P-5'-P-CCNC: 9 U/L (ref 0–70)
ANION GAP SERPL CALCULATED.3IONS-SCNC: 10 MMOL/L (ref 7–15)
APTT PPP: 40 SECONDS (ref 22–38)
AST SERPL W P-5'-P-CCNC: 24 U/L (ref 0–45)
BASE EXCESS BLDA CALC-SCNC: -1.1 MMOL/L (ref -9–1.8)
BILIRUB DIRECT SERPL-MCNC: 0.71 MG/DL (ref 0–0.3)
BILIRUB SERPL-MCNC: 1.1 MG/DL
BUN SERPL-MCNC: 35.7 MG/DL (ref 8–23)
CALCIUM SERPL-MCNC: 7.4 MG/DL (ref 8.8–10.2)
CHLORIDE SERPL-SCNC: 106 MMOL/L (ref 98–107)
CREAT SERPL-MCNC: 1.83 MG/DL (ref 0.67–1.17)
DEPRECATED HCO3 PLAS-SCNC: 22 MMOL/L (ref 22–29)
EGFRCR SERPLBLD CKD-EPI 2021: 40 ML/MIN/1.73M2
ERYTHROCYTE [DISTWIDTH] IN BLOOD BY AUTOMATED COUNT: 17.3 % (ref 10–15)
FIBRINOGEN PPP-MCNC: 718 MG/DL (ref 170–490)
GLUCOSE BLDC GLUCOMTR-MCNC: 157 MG/DL (ref 70–99)
GLUCOSE BLDC GLUCOMTR-MCNC: 162 MG/DL (ref 70–99)
GLUCOSE BLDC GLUCOMTR-MCNC: 171 MG/DL (ref 70–99)
GLUCOSE BLDC GLUCOMTR-MCNC: 186 MG/DL (ref 70–99)
GLUCOSE BLDC GLUCOMTR-MCNC: 195 MG/DL (ref 70–99)
GLUCOSE BLDC GLUCOMTR-MCNC: 202 MG/DL (ref 70–99)
GLUCOSE SERPL-MCNC: 214 MG/DL (ref 70–99)
HCO3 BLD-SCNC: 23 MMOL/L (ref 21–28)
HCT VFR BLD AUTO: 33.5 % (ref 40–53)
HGB BLD-MCNC: 10.2 G/DL (ref 13.3–17.7)
INR PPP: 1.92 (ref 0.85–1.15)
LACTATE SERPL-SCNC: 2 MMOL/L (ref 0.7–2)
MAGNESIUM SERPL-MCNC: 2.1 MG/DL (ref 1.7–2.3)
MCH RBC QN AUTO: 29 PG (ref 26.5–33)
MCHC RBC AUTO-ENTMCNC: 30.4 G/DL (ref 31.5–36.5)
MCV RBC AUTO: 95 FL (ref 78–100)
O2/TOTAL GAS SETTING VFR VENT: 40 %
PCO2 BLD: 36 MM HG (ref 35–45)
PH BLD: 7.42 [PH] (ref 7.35–7.45)
PHOSPHATE SERPL-MCNC: 2.4 MG/DL (ref 2.5–4.5)
PLATELET # BLD AUTO: 124 10E3/UL (ref 150–450)
PO2 BLD: 63 MM HG (ref 80–105)
POTASSIUM SERPL-SCNC: 4 MMOL/L (ref 3.4–5.3)
PREALB SERPL-MCNC: 4.9 MG/DL (ref 20–40)
PROT SERPL-MCNC: 4.3 G/DL (ref 6.4–8.3)
RBC # BLD AUTO: 3.52 10E6/UL (ref 4.4–5.9)
SODIUM SERPL-SCNC: 138 MMOL/L (ref 135–145)
TACROLIMUS BLD-MCNC: 11.3 UG/L (ref 5–15)
TME LAST DOSE: NORMAL H
TME LAST DOSE: NORMAL H
TRIGL SERPL-MCNC: 92 MG/DL
UFH PPP CHRO-ACNC: 0.11 IU/ML
UFH PPP CHRO-ACNC: <0.1 IU/ML
WBC # BLD AUTO: 5.5 10E3/UL (ref 4–11)

## 2023-12-11 PROCEDURE — 85520 HEPARIN ASSAY: CPT | Performed by: SURGERY

## 2023-12-11 PROCEDURE — 272N000473 HC KIT, VPS RHYTHM STYLET

## 2023-12-11 PROCEDURE — 80197 ASSAY OF TACROLIMUS: CPT | Performed by: NURSE PRACTITIONER

## 2023-12-11 PROCEDURE — 84134 ASSAY OF PREALBUMIN: CPT | Performed by: SURGERY

## 2023-12-11 PROCEDURE — 82248 BILIRUBIN DIRECT: CPT | Performed by: NURSE PRACTITIONER

## 2023-12-11 PROCEDURE — 250N000011 HC RX IP 250 OP 636: Performed by: STUDENT IN AN ORGANIZED HEALTH CARE EDUCATION/TRAINING PROGRAM

## 2023-12-11 PROCEDURE — 250N000011 HC RX IP 250 OP 636: Mod: JZ | Performed by: PHYSICIAN ASSISTANT

## 2023-12-11 PROCEDURE — 250N000011 HC RX IP 250 OP 636: Mod: JZ | Performed by: STUDENT IN AN ORGANIZED HEALTH CARE EDUCATION/TRAINING PROGRAM

## 2023-12-11 PROCEDURE — 36600 WITHDRAWAL OF ARTERIAL BLOOD: CPT

## 2023-12-11 PROCEDURE — 250N000009 HC RX 250

## 2023-12-11 PROCEDURE — 250N000012 HC RX MED GY IP 250 OP 636 PS 637: Performed by: PHYSICIAN ASSISTANT

## 2023-12-11 PROCEDURE — 250N000011 HC RX IP 250 OP 636

## 2023-12-11 PROCEDURE — 84478 ASSAY OF TRIGLYCERIDES: CPT | Performed by: SURGERY

## 2023-12-11 PROCEDURE — 05JY3ZZ INSPECTION OF UPPER VEIN, PERCUTANEOUS APPROACH: ICD-10-PCS | Performed by: SURGERY

## 2023-12-11 PROCEDURE — 83735 ASSAY OF MAGNESIUM: CPT | Performed by: NURSE PRACTITIONER

## 2023-12-11 PROCEDURE — 80053 COMPREHEN METABOLIC PANEL: CPT | Performed by: SURGERY

## 2023-12-11 PROCEDURE — 93971 EXTREMITY STUDY: CPT | Mod: 26 | Performed by: RADIOLOGY

## 2023-12-11 PROCEDURE — 250N000009 HC RX 250: Performed by: SURGERY

## 2023-12-11 PROCEDURE — 272N000103 HC INTRODUCER MICRO SET

## 2023-12-11 PROCEDURE — 84100 ASSAY OF PHOSPHORUS: CPT | Performed by: NURSE PRACTITIONER

## 2023-12-11 PROCEDURE — 85610 PROTHROMBIN TIME: CPT | Performed by: SURGERY

## 2023-12-11 PROCEDURE — 250N000013 HC RX MED GY IP 250 OP 250 PS 637

## 2023-12-11 PROCEDURE — 250N000011 HC RX IP 250 OP 636: Mod: JZ

## 2023-12-11 PROCEDURE — 999N000259 HC STATISTIC EXTUBATION

## 2023-12-11 PROCEDURE — 258N000003 HC RX IP 258 OP 636

## 2023-12-11 PROCEDURE — 94003 VENT MGMT INPAT SUBQ DAY: CPT

## 2023-12-11 PROCEDURE — 93971 EXTREMITY STUDY: CPT | Mod: RT

## 2023-12-11 PROCEDURE — 999N000157 HC STATISTIC RCP TIME EA 10 MIN

## 2023-12-11 PROCEDURE — 250N000013 HC RX MED GY IP 250 OP 250 PS 637: Performed by: STUDENT IN AN ORGANIZED HEALTH CARE EDUCATION/TRAINING PROGRAM

## 2023-12-11 PROCEDURE — B4185 PARENTERAL SOL 10 GM LIPIDS: HCPCS | Mod: JZ | Performed by: SURGERY

## 2023-12-11 PROCEDURE — 99233 SBSQ HOSP IP/OBS HIGH 50: CPT | Performed by: INTERNAL MEDICINE

## 2023-12-11 PROCEDURE — C9113 INJ PANTOPRAZOLE SODIUM, VIA: HCPCS | Performed by: STUDENT IN AN ORGANIZED HEALTH CARE EDUCATION/TRAINING PROGRAM

## 2023-12-11 PROCEDURE — 85027 COMPLETE CBC AUTOMATED: CPT | Performed by: STUDENT IN AN ORGANIZED HEALTH CARE EDUCATION/TRAINING PROGRAM

## 2023-12-11 PROCEDURE — 258N000003 HC RX IP 258 OP 636: Performed by: STUDENT IN AN ORGANIZED HEALTH CARE EDUCATION/TRAINING PROGRAM

## 2023-12-11 PROCEDURE — 36569 INSJ PICC 5 YR+ W/O IMAGING: CPT | Mod: 52

## 2023-12-11 PROCEDURE — 85730 THROMBOPLASTIN TIME PARTIAL: CPT

## 2023-12-11 PROCEDURE — 272N000452 HC KIT SHRLOCK 5FR POWER PICC TRIPLE LUMEN

## 2023-12-11 PROCEDURE — 82803 BLOOD GASES ANY COMBINATION: CPT

## 2023-12-11 PROCEDURE — 999N000253 HC STATISTIC WEANING TRIALS

## 2023-12-11 PROCEDURE — 3E0436Z INTRODUCTION OF NUTRITIONAL SUBSTANCE INTO CENTRAL VEIN, PERCUTANEOUS APPROACH: ICD-10-PCS | Performed by: SURGERY

## 2023-12-11 PROCEDURE — 200N000002 HC R&B ICU UMMC

## 2023-12-11 PROCEDURE — 93010 ELECTROCARDIOGRAM REPORT: CPT | Performed by: INTERNAL MEDICINE

## 2023-12-11 PROCEDURE — 250N000011 HC RX IP 250 OP 636: Performed by: SURGERY

## 2023-12-11 PROCEDURE — 250N000009 HC RX 250: Performed by: PHYSICIAN ASSISTANT

## 2023-12-11 PROCEDURE — 99291 CRITICAL CARE FIRST HOUR: CPT | Mod: GC | Performed by: SURGERY

## 2023-12-11 PROCEDURE — 85384 FIBRINOGEN ACTIVITY: CPT

## 2023-12-11 PROCEDURE — 250N000012 HC RX MED GY IP 250 OP 636 PS 637

## 2023-12-11 PROCEDURE — 250N000009 HC RX 250: Performed by: STUDENT IN AN ORGANIZED HEALTH CARE EDUCATION/TRAINING PROGRAM

## 2023-12-11 PROCEDURE — 83605 ASSAY OF LACTIC ACID: CPT

## 2023-12-11 PROCEDURE — 93005 ELECTROCARDIOGRAM TRACING: CPT

## 2023-12-11 RX ORDER — DEXTROSE MONOHYDRATE 25 G/50ML
25-50 INJECTION, SOLUTION INTRAVENOUS
Status: DISCONTINUED | OUTPATIENT
Start: 2023-12-11 | End: 2023-12-19

## 2023-12-11 RX ORDER — NICOTINE POLACRILEX 4 MG
15-30 LOZENGE BUCCAL
Status: DISCONTINUED | OUTPATIENT
Start: 2023-12-11 | End: 2023-12-19

## 2023-12-11 RX ORDER — METOPROLOL TARTRATE 1 MG/ML
5 INJECTION, SOLUTION INTRAVENOUS EVERY 6 HOURS
Status: DISCONTINUED | OUTPATIENT
Start: 2023-12-11 | End: 2023-12-16

## 2023-12-11 RX ORDER — NOREPINEPHRINE BITARTRATE 0.06 MG/ML
.01-.6 INJECTION, SOLUTION INTRAVENOUS CONTINUOUS
Status: DISCONTINUED | OUTPATIENT
Start: 2023-12-11 | End: 2023-12-11

## 2023-12-11 RX ORDER — POTASSIUM PHOS IN 0.9 % NACL 15MMOL/250
15 PLASTIC BAG, INJECTION (ML) INTRAVENOUS ONCE
Qty: 250 ML | Refills: 0 | Status: COMPLETED | OUTPATIENT
Start: 2023-12-11 | End: 2023-12-11

## 2023-12-11 RX ORDER — CALCIUM GLUCONATE 20 MG/ML
1 INJECTION, SOLUTION INTRAVENOUS ONCE
Status: COMPLETED | OUTPATIENT
Start: 2023-12-11 | End: 2023-12-11

## 2023-12-11 RX ADMIN — MAGNESIUM SULFATE HEPTAHYDRATE: 500 INJECTION, SOLUTION INTRAMUSCULAR; INTRAVENOUS at 19:42

## 2023-12-11 RX ADMIN — PIPERACILLIN AND TAZOBACTAM 3.38 G: 3; .375 INJECTION, POWDER, LYOPHILIZED, FOR SOLUTION INTRAVENOUS at 13:35

## 2023-12-11 RX ADMIN — SODIUM CHLORIDE 0.5 MG/MIN: 0.9 INJECTION, SOLUTION INTRAVENOUS at 00:43

## 2023-12-11 RX ADMIN — INSULIN ASPART 2 UNITS: 100 INJECTION, SOLUTION INTRAVENOUS; SUBCUTANEOUS at 05:14

## 2023-12-11 RX ADMIN — POLYETHYLENE GLYCOL 400 AND PROPYLENE GLYCOL 1 DROP: 4; 3 SOLUTION/ DROPS OPHTHALMIC at 10:04

## 2023-12-11 RX ADMIN — BISACODYL 10 MG: 10 SUPPOSITORY RECTAL at 08:23

## 2023-12-11 RX ADMIN — CALCIUM GLUCONATE 1 G: 20 INJECTION, SOLUTION INTRAVENOUS at 09:56

## 2023-12-11 RX ADMIN — LEVOTHYROXINE SODIUM ANHYDROUS 70 MCG: 100 INJECTION, POWDER, LYOPHILIZED, FOR SOLUTION INTRAVENOUS at 10:02

## 2023-12-11 RX ADMIN — TACROLIMUS 0.5 MG: 0.5 CAPSULE ORAL at 08:31

## 2023-12-11 RX ADMIN — MYCOPHENOLATE MOFETIL 500 MG: 500 INJECTION, POWDER, LYOPHILIZED, FOR SOLUTION INTRAVENOUS at 20:01

## 2023-12-11 RX ADMIN — TACROLIMUS 0.5 MG: 0.5 CAPSULE ORAL at 18:16

## 2023-12-11 RX ADMIN — POTASSIUM PHOSPHATE, MONOBASIC POTASSIUM PHOSPHATE, DIBASIC 15 MMOL: 224; 236 INJECTION, SOLUTION, CONCENTRATE INTRAVENOUS at 07:55

## 2023-12-11 RX ADMIN — MYCOPHENOLATE MOFETIL 500 MG: 500 INJECTION, POWDER, LYOPHILIZED, FOR SOLUTION INTRAVENOUS at 09:07

## 2023-12-11 RX ADMIN — INSULIN ASPART 2 UNITS: 100 INJECTION, SOLUTION INTRAVENOUS; SUBCUTANEOUS at 01:25

## 2023-12-11 RX ADMIN — PANTOPRAZOLE SODIUM 40 MG: 40 INJECTION, POWDER, FOR SOLUTION INTRAVENOUS at 08:19

## 2023-12-11 RX ADMIN — METOPROLOL TARTRATE 5 MG: 5 INJECTION INTRAVENOUS at 22:23

## 2023-12-11 RX ADMIN — HEPARIN SODIUM 1500 UNITS/HR: 10000 INJECTION, SOLUTION INTRAVENOUS at 21:40

## 2023-12-11 RX ADMIN — FLUCONAZOLE 200 MG: 2 INJECTION, SOLUTION INTRAVENOUS at 12:03

## 2023-12-11 RX ADMIN — OLIVE OIL AND SOYBEAN OIL 250 ML: 16; 4 INJECTION, EMULSION INTRAVENOUS at 19:44

## 2023-12-11 RX ADMIN — PIPERACILLIN AND TAZOBACTAM 3.38 G: 3; .375 INJECTION, POWDER, LYOPHILIZED, FOR SOLUTION INTRAVENOUS at 01:33

## 2023-12-11 RX ADMIN — PIPERACILLIN AND TAZOBACTAM 3.38 G: 3; .375 INJECTION, POWDER, LYOPHILIZED, FOR SOLUTION INTRAVENOUS at 19:43

## 2023-12-11 RX ADMIN — SODIUM CHLORIDE, POTASSIUM CHLORIDE, SODIUM LACTATE AND CALCIUM CHLORIDE: 600; 310; 30; 20 INJECTION, SOLUTION INTRAVENOUS at 22:35

## 2023-12-11 RX ADMIN — TOPICAL ANESTHETIC 0.5 ML: 200 SPRAY DENTAL; PERIODONTAL at 21:53

## 2023-12-11 RX ADMIN — PIPERACILLIN AND TAZOBACTAM 3.38 G: 3; .375 INJECTION, POWDER, LYOPHILIZED, FOR SOLUTION INTRAVENOUS at 08:12

## 2023-12-11 RX ADMIN — METHYLPREDNISOLONE SODIUM SUCCINATE 4 MG: 40 INJECTION INTRAMUSCULAR; INTRAVENOUS at 08:28

## 2023-12-11 RX ADMIN — METOPROLOL TARTRATE 5 MG: 5 INJECTION INTRAVENOUS at 16:32

## 2023-12-11 ASSESSMENT — ACTIVITIES OF DAILY LIVING (ADL)
ADLS_ACUITY_SCORE: 30

## 2023-12-11 NOTE — PROGRESS NOTES
CLINICAL NUTRITION SERVICES - BRIEF NOTE      Reason for RD note: NFPE and nutrition history obtained, Follow-up on nutrition plan of care    New Findings/Chart Review:  Nutrition support:   TPN started 12/10  Dextrose 125 grams    grams   250 mL Lipids 6 times weekly    Pt extubated today  Discussed with wife at bedside, pt was eating well prior to surgery  Per discussion in rounds, pt to remain NPO for ~ one week, will need SBFT contrast study prior to initiating PO diet    Labs: phos 2.4 (L), BG elevated 162 to 202 since start of TPN - changed to high resistance insuline    MALNUTRITION  % Intake: No decreased intake noted  % Weight Loss: None noted  Subcutaneous Fat Loss: None observed  Muscle Loss: None observed  Fluid Accumulation/Edema: Does not meet criteria  Malnutrition Diagnosis: Patient does not meet two of the established criteria necessary for diagnosing malnutrition    Interventions:  Collaboration with providers - SICU rounds, Pharm - no change to Dextrose in TPN d/t low phos and hyperglycemia; plan to check baseline TG level    Future/Additional Recommendations:  Monitor for ability to advance dextrose to goal - increase by 50 gm per day to 225 g    Nutrition will continue to follow per protocol.    Rekha Mcdonough, RD, LD, CNSC  4E SICU RD pager: [phone]  Ascom: [phone]  Weekend/Holiday RD pager [phone]

## 2023-12-11 NOTE — PROGRESS NOTES
PICC failed attempt Right Arm unable to thread the catheter in suggest referring patient to IR for PICC placement.

## 2023-12-11 NOTE — PROGRESS NOTES
Pt extubates without problems.  Pt placed on oxi mask at 6lpm with SaO2 96%.  BBS clear t/o.  Pt has good cough without prod.

## 2023-12-11 NOTE — PROGRESS NOTES
[facility]   Transplant Nephrology Progress Note  Date of Admission:  12/8/2023  Today's Date: 12/11/2023    Recommendations:  - No acute indications for dialysis.    Assessment & Plan   # DDKT (SLK): Stable, elevated serum creatinine.  Good urine output.   - PATI felt secondary to hypotension and sepsis with probable ATN.    - Baseline Creatinine: ~ 0.8-1.0   - Proteinuria: Normal (<0.2 grams)   - Date DSA Last Checked: Aug/2023      Latest DSA: No   - BK Viremia: No   - Kidney Tx Biopsy: Jun 20, 2023; Result: No diagnostic evidence of acute rejection.   Focal acute tubular necrosis.  Mild arterial and arteriolar sclerosis.    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  Transaminases Stable.  Followed by Transplant Surgery.     # Immunosuppression Prior to Admission: Tacrolimus immediate release (goal 6-8), Mycophenolic acid (dose 540 mg every 12 hours), and Prednisone (dose 5 mg daily)   - Present Immunosuppression: Tacrolimus immediate release (goal 6-8), Mycophenolate mofetil (dose 500 mg every 12 hours), and Methylprednisolone (dose 4 mg daily)   - Patient is in an immunosuppressed state and will continue to monitor for efficacy and toxicity of immunosuppression medications.   - Changes: Not at this time     # Infection Prophylaxis:   - PJP: Sulfa/TMP (Bactrim) - On hold  - CMV: None, prophylaxis completed; CMV IgG Ab positive  - Fungal: Fluconazole (Diflucan)    # Hypertension: Controlled, but low at times;  Goal BP: < 140/90 (Hospitalization goal)   - Volume status: Mildly hypervolemic  EDW ~ 225 lbs   - Changes: Not at this time; Antihypertensive medications on hold at this time.    # Elevated Blood Glucose: Glucose generally running ~ 130-210s   - On insulin.   - Management as per primary team.    # Anemia in Chronic Renal Disease: Hgb: Trend down      FEDERICO: No   - Iron studies: Low  iron saturation    # Mineral Bone Disorder:   - Secondary renal hyperparathyroidism; PTH level: Normal (15-65 pg/ml)        On treatment: None  - Vitamin D; level: Normal        On supplement: No  - Calcium; level: Low        On supplement: No  - Phosphorus; level: Low        On supplement: No    # Electrolytes:   - Potassium; level: Normal        On supplement: No  - Magnesium; level: Normal        On supplement: No  - Bicarbonate; level: Normal        On supplement: No    # Possible Pneumonia: Patient with LLL atelectasis and small right pleural effusion on CXR.  He has a productive cough, but negative cultures.  On broad-spectrum antibiotics for abdominal infection.  Oxygenation is okay.    # Umbilical Hernia Mesh Repair: Patient is s/p scheduled redo mesh repair of umbilical hernia and minimal DHEERAJ of small bowel 12/7/23. Patient then represented 12/8/23 from home with severe LLQ abdominal pain and N/V 12/8/23.  Back to OR 12/10 and found to have bowel leak and repaired.  On broad spectrum-antibiotics and antifungal.     # Paroxysmal A-Fib with Episodic RVR: Patient had episode of unresponsiveness 12/8/23- possible seizure vs apnea. Extensive workup with evidence of A fib w RVR, s/p PO dose of metoprolol.  Received Digoxin 500 mcg given IV once and subsequent dose of 250mcg on 12/9.  On apixaban PTA.  A. fib felt to be driven by septic shock. Started on amiodarone gtt on 12/9.  Presently in A. fib, but rate controlled.  Anticoagulation on hold due to recent surgery.    # Peripheral Neuropathy: Had been stable on gabapentin, but presently on hold due to recent hypotension.     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  Previously treated with rituximab x 2.     # Hx of Gout: PTA on prednisone 5mg daily and allopurinol 300mg daily.  Also takes anakinra 100mg with gout symptoms.  Allopurinol presently on hold due to NPO.    # Transplant History:  Etiology of Kidney Failure: IgA nephropathy and ANCA  vasculitis  Tx: DDKT (SLK) and Liver Tx (SLK)  Transplant: 6/6/2023 (Liver), 6/6/2023 (Kidney)  Significant changes in immunosuppression: None  Significant transplant-related complications: CMV Viremia and DGF    Recommendations were communicated to the primary team via this note.    Jeovany Scott MD  Transplant Nephrology  Contact information available via [facility] Paging/Directory    Interval History   Mr. Miranda creatinine is 1.83 (12/11 0327); Stable.  Good urine output.  Stable, normal transaminases.  Other significant labs/tests/vitals: Stable electrolytes.  Decreased hemoglobin.  Stable, low platelet level.  No new events overnight.  No chest pain or shortness of breath, except with coughing attacks.  Some leg swelling.  No nausea and vomiting.  Bowel movements are none and not passing gas yet.  No fever although feels warm.  No sweats or chills.    Review of Systems   4 point ROS was obtained and negative except as noted in the Interval History.    MEDICATIONS:   [Held by provider] allopurinol  300 mg Oral or Feeding Tube Daily    [Held by provider] atorvastatin  40 mg Oral or Feeding Tube QPM    bisacodyl  10 mg Rectal Daily    fluconazole  200 mg Intravenous Q24H    [Held by provider] gabapentin  100 mg Oral or Feeding Tube BID    [Held by provider] gabapentin  200 mg Oral or Feeding Tube Daily    insulin aspart  1-12 Units Subcutaneous Q4H    levothyroxine  70 mcg Intravenous Daily    [Held by provider] levothyroxine  88 mcg Oral or Feeding Tube Daily    lipids plant base  250 mL Intravenous Once per day on Monday Tuesday Wednesday Thursday Friday Saturday    methylPREDNISolone  4 mg Intravenous Daily    [Held by provider] metoprolol tartrate  50 mg Oral BID    [Held by provider] mycophenolate  750 mg Oral or NG Tube BID IS    mycophenolate mofetil  500 mg Intravenous BID    [Held by provider] pantoprazole  40 mg Oral QAM AC    pantoprazole  40 mg Intravenous Daily with breakfast     piperacillin-tazobactam  3.375 g Intravenous Q6H    [Held by provider] predniSONE  5 mg Oral or Feeding Tube Daily    [Held by provider] senna-docusate  2 tablet Oral or Feeding Tube BID    [Held by provider] sodium chloride (PF)  3 mL Intracatheter Q8H    [Held by provider] sulfamethoxazole-trimethoprim  1 tablet Oral or Feeding Tube Daily    tacrolimus  0.5 mg Sublingual BID IS      amiodarone 0.5 mg/min (23 1000)    dextrose      heparin 1,200 Units/hr (23 0917)    lactated ringers 30 mL/hr at 23 0800    parenteral nutrition - ADULT compounded formula      parenteral nutrition - ADULT compounded formula 40 mL/hr at 23 0800       Physical Exam   Temp  Av.1  F (36.7  C)  Min: 96.7  F (35.9  C)  Max: 100.1  F (37.8  C)  Arterial Line BP  Min: 70/55  Max: 158/122  Arterial Line MAP (mmHg)  Av.2 mmHg  Min: 61 mmHg  Max: 207 mmHg      Pulse  Av  Min: 60  Max: 147 Resp  Av  Min: 9  Max: 36  FiO2 (%)  Av.9 %  Min: 40 %  Max: 50 %  SpO2  Av.9 %  Min: 85 %  Max: 100 %     /66   Pulse 87   Temp 97.4  F (36.3  C) (Axillary)   Resp 19   Wt 111.5 kg (245 lb 13 oz)   SpO2 95%   BMI 38.50 kg/m     Date 23 07 - 23 0659   Shift 6675-4754 9242-6215 9415-6018 24 Hour Total   INTAKE   I.V. 545.28   545.28      160   Shift Total(mL/kg) 705.28(6.33)   705.28(6.33)   OUTPUT   Urine 225   225   Shift Total(mL/kg) 225(2.02)   225(2.02)   Weight (kg) 111.5 111.5 111.5 111.5      Admit Weight: 111.5 kg (245 lb 13 oz)     GENERAL APPEARANCE: alert and no distress  HENT: mouth without ulcers or lesions  RESP: lungs clear to auscultation from anterior - no rales, rhonchi or wheezes  CV: irregular rhythm and rate, no rub, no murmur  EDEMA: 1+ LE edema bilaterally  ABDOMEN: soft, nondistended, mild TTP, bowel sounds quiet  MS: extremities normal - no gross deformities noted, no evidence of inflammation in joints, no muscle tenderness  SKIN: no rash  TX  KIDNEY: normal  DIALYSIS ACCESS:  None    Data   All labs reviewed by me.  CMP  Recent Labs   Lab 12/11/23  0750 12/11/23  0512 12/11/23 0327 12/11/23  0018 12/10/23  1415 12/10/23  1408 12/10/23  0631 12/10/23  0233   NA  --   --  138  --   --  138 139 138   POTASSIUM  --   --  4.0  --   --  4.2 4.9 4.3   CHLORIDE  --   --  106  --   --  105 105 104   CO2  --   --  22  --   --  18* 15* 21*   ANIONGAP  --   --  10  --   --  15 19* 13   * 195* 214* 202*   < > 131* 94 113*   BUN  --   --  35.7*  --   --  30.7* 31.8* 30.2*   CR  --   --  1.83*  --   --  1.71* 1.85* 1.86*   GFRESTIMATED  --   --  40*  --   --  44* 40* 39*   NAZARIO  --   --  7.4*  --   --  7.4* 8.5* 8.2*   MAG  --   --  2.1  --   --  1.8 1.9 1.8   PHOS  --   --  2.4*  --   --  3.5 3.9 3.3   PROTTOTAL  --   --  4.3*  --   --  4.3* 5.0* 4.9*   ALBUMIN  --   --  2.7*  --   --  2.9* 2.9* 3.3*   BILITOTAL  --   --  1.1  --   --  1.5* 1.2 1.1   ALKPHOS  --   --  59  --   --  59 78 71   AST  --   --  24  --   --  23 49* 26   ALT  --   --  9  --   --  7 9 9    < > = values in this interval not displayed.     CBC  Recent Labs   Lab 12/11/23  0327 12/10/23  1408 12/10/23  0233 12/09/23  2152   HGB 10.2* 12.1* 13.3 15.4   WBC 5.5 5.9 5.7 6.2   RBC 3.52* 4.19* 4.63 5.31   HCT 33.5* 39.0* 43.1 49.3   MCV 95 93 93 93   MCH 29.0 28.9 28.7 29.0   MCHC 30.4* 31.0* 30.9* 31.2*   RDW 17.3* 17.5* 17.6* 17.9*   * 128* 174 192     INR  Recent Labs   Lab 12/11/23  0327 12/10/23  1408   INR 1.92* 2.29*   PTT 40* 40*     ABG  Recent Labs   Lab 12/11/23  0826 12/10/23  2024 12/10/23  1731 12/10/23  1408   PH 7.42 7.36 7.12* 7.35   PCO2 36 39 65* 38   PO2 63* 90 88 87   HCO3 23 22 21 21   O2PER 40 40 50 50      Urine Studies  Recent Labs   Lab Test 12/09/23  1405 11/07/23  0753 09/27/23  1157 09/25/23  0759 09/20/23  1645 08/29/23  1023 08/04/23  0715   COLOR Yellow Yellow Dark Yellow* Yellow Yellow   < > Yellow   APPEARANCE Clear Clear Clear Clear Clear   < > Clear    URINEGLC Negative Negative 50* Negative 100*   < > Negative   URINEBILI Negative Small* Small* Small* Negative   < > Negative   URINEKETONE Negative Negative Negative Negative Negative   < > Negative   SG 1.022 1.015 1.029 1.020 1.015   < > 1.010   UBLD Negative Negative Negative Negative Negative   < > Negative   URINEPH 5.0 7.0 6.0 7.0 7.0   < > 6.5   PROTEIN 10* Trace* 70* 30* Negative   < > Negative   UROBILINOGEN  --  0.2  --  0.2 0.2  --  0.2   NITRITE Negative Negative Negative Negative Negative   < > Negative   LEUKEST Negative Trace* Negative Negative Negative   < > Small*   RBCU 2 0-2 1 None Seen  --   --  0-2   WBCU 6* 10-25* 7* None Seen  --   --  0-5    < > = values in this interval not displayed.     No lab results found.  PTH  Recent Labs   Lab Test 09/20/23  1611 08/01/23  0924 05/19/23  0956   PTHI 24 45 67*     Iron Studies  Recent Labs   Lab Test 11/21/23  0742 11/07/23  0753 10/05/23  0752 09/05/23  0702 08/07/23  0726 08/01/23  0924 11/22/22  0848   IRON 60* 53* 83 32* 28* 23* 68    226* 241 258 244 231* 219*   IRONSAT 22 23 34 12* 11* 10* 31   HOLA 204 341 919* 485* 440* 506* 429*       IMAGING:  All imaging studies reviewed by me.

## 2023-12-11 NOTE — PLAN OF CARE
Major Shift Events: Opening eyes spontaneously or to voice, moving all extremities to commands, PERRL, 3mm. Lethargic. A fib, tachycardic 100-120s, normotensive via R radial art line and hypothermic upon arrival from OR, bear lougger corrected to normothermia, now off. ETT 25 @ lip, CMV settings 40%, 15, 500, 5- PS 7/5 failed with low TV and poor ABG. Minimal oral secretions and none via ETT. Clear/dim LS. NG to LIS, 50 ml bile output. NPO. Lux with adequate UOP, 50 ml/hr. Umbilical hernia incision covered with primapore, dried drainage throughout. 3x MAE drains with moderate output initially, now slowing down in output. L fem site and R PIV x2, R art line. Denies pain. Amio @ 0.5, NS @ 75 and TKO for abx. Pt down to OR today ~ 0845 for ex lap, returned ~ 1330. Pt family at bedside throughout day.     Plan: Continue to monitor respiratory status and hemodynamics overnight  For vital signs and complete assessments, please see documentation flowsheets.

## 2023-12-11 NOTE — PROGRESS NOTES
Follow-up with anemia management service:    Admitted 12/8/23 with Acute pain post Liver Tx.   Surgery 12/10/23: Small bowel perforation.         Latest Ref Rng & Units 12/8/2023     7:48 PM 12/9/2023     5:54 AM 12/9/2023     1:36 PM 12/9/2023     9:52 PM 12/10/2023     2:33 AM 12/10/2023     2:08 PM 12/11/2023     3:27 AM   Anemia   Hemoglobin 13.3 - 17.7 g/dL 17.1  17.7  15.4  15.4  13.3  12.1  10.2          Follow-up call date: 12/18/23    Molly Fenton RN   Anemia Services  [facility]  [email or web address]   Office : [phone]  Fax: [phone]

## 2023-12-11 NOTE — PLAN OF CARE
Goal Outcome Evaluation:      Plan of Care Reviewed With: patient, spouse    Overall Patient Progress: improving  Major Shift Events:  S/P exp lap for SBO.  Extubated this am.  Now on 4L oxiplus.  Fair cough effort.  ABD distended and firm.  Hypoactive BS. NG to LIS, but not working.  Adjusted and now with increased output.  Denies abd pain, but feels full.  Supp given with no flatus observed. C/O Headach that resolved with repositioning and dimmed lights.   Remains on amiodarone and TPN.  Heparin gtt restarted.  Xa pending.   Wife and daughter at bedside.  Questions answered.  Plan: Continue to monitor.  Increase activity.  Aggressive pulm hygiene.  For vital signs and complete assessments, please see documentation flowsheets.

## 2023-12-11 NOTE — PROGRESS NOTES
Spoke with RN about failed PICC by RN and IR failed to place PICC also last time. So advised to inform MD.

## 2023-12-11 NOTE — PROGRESS NOTES
SURGICAL ICU PROGRESS NOTE  December 10, 2023      CO-MORBIDITIES:   Acute post-operative pain  Liver replaced by transplant (H)  Status post kidney transplant    ASSESSMENT: Damion Quinones is a 68 year old male with a history of PAF, obesity, HTN, pre-diabetes, rheumatoid and psoriatic arthritis, CAD, and cirrhosis (alcohol + hemochromatosis) and 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 He underwent a redo mesh repair of umbilical hernia and minimal DHEERAJ of small bowel 12/7/23 with Dr. Clifton. He presented 12/9 from home with severe LLQ abdominal pain and N/V. CT without contrast without evidence of obstruction or perforation. Throughout the day 12/9 in Afib with RVR, progressively hypotensive despite aggressive fluid resuscitation, ultimately requiring pressor support and transfer to SICU. RTOR and now s/p repair of bowel perforation and primary fascial closure 12/10.     INTERVAL EVENTS  - Kept intubated overnight  - Hypotensive with precedex, discontinued   - A-Line not working, Cuff pressures correlating    CHANGES AND UPDATES TODAY:   - PST this AM with goal for extubation  - Check EKG, Plan to start IV metoprolol later and discontinue amiodarone gtt  - Continue zosyn/fluconazole, follow cultures  - Change from MDSSI to HDSSI  - Electrolyte replacement  - Resume low-intensity heparin gtt  - Place PICC for TPN  - Remove arterial line, Use BP cuff for monitoring  - Remove femoral line once PICC placed  - Will plan to remove stauffer later today versus tomorrow    PLAN:  Neurological:  # Acute post-op pain   # Peripheral neuropathy  - Pain control: PRN IV dilaudid, Capsaicin cream  - Monitor neurological status. Delirium preventions and precautions.      Pulmonary:   # Respiratory insufficiency  # Pulmonary edema, atelectasis  # Post-operative ventilatory support  - Careful monitoring of fluid resuscitation with respiratory status  - Intubated and ventilated  Vent Mode: CMV/AC  (Continuous  Mandatory Ventilation/ Assist Control)  FiO2 (%): 40 %  Resp Rate (Set): 16 breaths/min  Tidal Volume (Set, mL): 500 mL  PEEP (cm H2O): 5 cmH2O  Pressure Support (cm H2O): 5 cmH2O  Resp: 16  - Plan for PST this AM and subsequent extubation     Cardiovascular:    # Hx CAD  # Atrial fibrillation with RVR   # Shock, septic vs mixed  - Most recent echo 12/9              Technically difficult study. Extremely difficult acoustic windows despite the  use of contrast for endcardial border definition.  Global and regional left ventricular function is normal with an EF of 55-60%.  Right ventricular function cannot be assessed due to poor image quality.  No pericardial effusion is present  - Cardiology consulted 12/9, appreciate recs. No current indications for cardioversion  - Digoxin 500mcg given 12/9 AM and PM  - Amiodarone bolus and gtt, Transition to PO amio taper once able to take PO  - MAP goal > 65, Maintaining independently   - Hold PTA metoprolol, Plan to resume as IV metoprolol this PM    - Check EKG this AM     Gastroenterology/Nutrition:  # Hx cirrhosis s/p SLK 6/6/23  # Delayed return of bowel function s/p redo umbilical hernia mesh repair, DHEERAJ 12/7   # Perforated bowel s/p primary repair, 12/10  # Lactic acidosis  - Continue NPO, NGT to LIS, 160cc/24H  - TPN for nutrition, Plan for contrast study in coming days prior to initiation of PO  - Lactate normalized     Renal/Fluids/Electrolytes:   # s/p SLK 6/6/23  # Acute kidney injury   - Cr 1.83, baseline ~1.0  - Borderline UOP, 750/24h, Continue to closely monitor   - Lux in place for strict I/O     Endocrine:  # Hypothyroidism   # Stress hyperglycemia    - PTA levothyroxine  - HDSSI     ID:  # Concern for septic shock, Resolved  - Zosyn, Fluconazole started empirically 12/9   - Discontinued vanc 12/10 given negative MRSA   - Follow cultures  - Positive cultures:   12/10 - Yeast growing from abdomen    # Immunosuppression / prophylaxis  - MMF/tacrolimus, Bactrim,  Valcyte     Heme:     # Hx provoked DVT on apixapan  # Anemia of chronic illness  - Continue holding PTA apixaban held  - Low intensity heparin gtt per transplant  - Transfuse if hgb <7.0 or signs/symptoms of hypoperfusion. Monitor and trend.      Musculoskeletal/Rheum:  # Hx gout   # Weakness and deconditioning of critical illness   - PTA allopurinol, prednisone held while NPO  - Physical and occupational therapy consult      General Cares/Prophylaxis:    DVT Prophylaxis: Heparin gtt  GI Prophylaxis: PPI  Restraints: Restraints for medical healing needed: NO     Lines/ tubes/ drains:  - NGT, PIV, Lux     Disposition:  - Surgical ICU    ====================================    SUBJECTIVE:   NAEON. Off sedation, awake, alert, able to follow commands.     OBJECTIVE:   1. VITAL SIGNS:   Temp:  [96.7  F (35.9  C)-98.8  F (37.1  C)] 98.8  F (37.1  C)  Pulse:  [] 75  Resp:  [15-28] 16  BP: ()/(53-86) 108/76  MAP:  [61 mmHg-207 mmHg] 83 mmHg  Arterial Line BP: ()/() 86/79  FiO2 (%):  [40 %-50 %] 40 %  SpO2:  [90 %-99 %] 97 %  Vent Mode: CMV/AC  (Continuous Mandatory Ventilation/ Assist Control)  FiO2 (%): 40 %  Resp Rate (Set): 16 breaths/min  Tidal Volume (Set, mL): 500 mL  PEEP (cm H2O): 5 cmH2O  Pressure Support (cm H2O): 5 cmH2O  Resp: 16      2. INTAKE/ OUTPUT:   I/O last 3 completed shifts:  In: 4031.2 [I.V.:2857.2; NG/GT:90]  Out: 1619 [Urine:750; Emesis/NG output:510; Drains:309; Blood:50]    3. PHYSICAL EXAMINATION:   General: Awake  Neuro: A&Ox3, NAD  Resp: Breathing non-labored on vent  CV: Afib, Rate-controlled  Abdomen: Distended, Expected post-operative changes around prior incision site, 3 JPs with serosanguinous output  : Lux catheter in place with dark yellow urine  Extremities: Warm and well perfused, 2+ edema, Moving independently    4. INVESTIGATIONS:   Arterial Blood Gases   Recent Labs   Lab 12/10/23  2024 12/10/23  1731 12/10/23  1408 12/09/23  1314   PH 7.36 7.12* 7.35  7.38   PCO2 39 65* 38 37   PO2 90 88 87 74*   HCO3 22 21 21 22     Complete Blood Count   Recent Labs   Lab 12/11/23  0327 12/10/23  1408 12/10/23  0233 12/09/23  2152   WBC 5.5 5.9 5.7 6.2   HGB 10.2* 12.1* 13.3 15.4   * 128* 174 192     Basic Metabolic Panel  Recent Labs   Lab 12/11/23  0512 12/11/23 0327 12/11/23  0018 12/10/23  2017 12/10/23  1415 12/10/23  1408 12/10/23  0631 12/10/23  0233   NA  --  138  --   --   --  138 139 138   POTASSIUM  --  4.0  --   --   --  4.2 4.9 4.3   CHLORIDE  --  106  --   --   --  105 105 104   CO2  --  22  --   --   --  18* 15* 21*   BUN  --  35.7*  --   --   --  30.7* 31.8* 30.2*   CR  --  1.83*  --   --   --  1.71* 1.85* 1.86*   * 214* 202* 168*   < > 131* 94 113*    < > = values in this interval not displayed.     Liver Function Tests  Recent Labs   Lab 12/11/23  0327 12/10/23  1408 12/10/23  0631 12/10/23  0233   AST 24 23 49* 26   ALT 9 7 9 9   ALKPHOS 59 59 78 71   BILITOTAL 1.1 1.5* 1.2 1.1   ALBUMIN 2.7* 2.9* 2.9* 3.3*   INR 1.92* 2.29*  --   --      Pancreatic Enzymes  No lab results found in last 7 days.  Coagulation Profile  Recent Labs   Lab 12/11/23  0327 12/10/23  1408   INR 1.92* 2.29*   PTT 40* 40*         5. RADIOLOGY:   Recent Results (from the past 24 hour(s))   US Liver Transplant    Narrative    EXAMINATION: US LIVER TRANSPLANT, 12/10/2023 8:30 AM     COMPARISON: Ultrasound 9/27/2023 abdominal CT 12/9/2023    HISTORY: s/p liver transplant in 6/2023, shock    TECHNIQUE:  Gray-scale, color Doppler and spectral flow analysis.    FINDINGS:   There is small volume complex/loculated ascites in the lower quadrants  and left upper quadrant.    Liver:   The liver demonstrates normal homogeneous echotexture. No  evidence of a focal hepatic mass.     Bile Ducts: Both the intra- and extrahepatic biliary system are of  normal caliber.  The common bile duct measures 4 mm in diameter.    Gallbladder: The gallbladder is surgically absent.    Kidneys:   The  native kidneys are not well visualized.     Pancreas: Not well visualized.    Spleen:  The spleen is mildly enlarged, measuring 14.6 cm.    Visualized portions of the aorta are unremarkable.    LIVER DOPPLER:  Splenic vein:  Patent continuous normal antegrade direction flow  towards the liver, 33 cm/sec.  Extrahepatic portal vein: Not well visualized due to overlying bowel  gas.  Portal vein at anastomosis: Patent continuous antegrade flow, 50  cm/sec.  Intrahepatic portal vein:  Patent continuous antegrade flow, 37  cm/sec.  Right portal vein flow is antegrade, measuring 54 cm/sec.  Left portal vein flow is antegrade, measuring 17 cm/sec.    Inferior vena cava: patent with flow toward the heart throughout..  IVC above anastomosis: Not well-visualized.  IVC at anastomosis:  105 cm/sec.  Intrahepatic IVC:  54 cm/sec.  Extrahepatic IVC: not well visualized.    Right, mid, left hepatic veins: Patent with flow towards the inferior  vena cava.    Extrahepatic hepatic artery: Low resistance waveform with flow towards  the liver. 98 cm/sec with resistive index 0.64.  Right hepatic artery: Not well visualized.  Left hepatic artery: 79 cm/sec with resistive index 0.59.      Impression    Impression:   1. Examination is partially limited due to overlying bowel gas.  2. The visualized hepatic vasculature is patent with antegrade flow.  3. Normal grayscale appearance of the transplant liver.  4. Mild splenomegaly.  5. Small volume ascites.    I have personally reviewed the examination and initial interpretation  and I agree with the findings.    RIKI OSPINA DO         SYSTEM ID:  [id number]   US Renal Transplant with Doppler    Narrative    EXAMINATION: US RENAL TRANSPLANT,  12/10/2023 8:30 AM     COMPARISON: Ultrasound 9/20/2023, CT 12/9/2023    HISTORY: s/p kidney transplant, shock    TECHNIQUE:  Grey-scale, color Doppler and spectral flow analysis.    FINDINGS:  The transplant kidney is located in the right lower quadrant,  and  measures 11.8 cm. Parenchyma is of normal thickness and echogenicity.  No focal lesions. No hydronephrosis. No perinephric fluid collection.    Renal artery flow:   72 cm/sec peak systolic at hilum.  Not well visualized at the anastomosis due to overlying bowel gas.   Arcuate artery resistive indices (upper to lower): 0.73, 0.63, 0.58    Renal Vein Flow:  12 cm/s at hilum.   Not well visualized at the anastomosis due to overlying bowel gas.     Iliac artery flow:  Not well visualized above the anastomosis due to overlying bowel gas.   71 cm/s peak systolic below anastomosis.    Iliac vein flow:  Not well visualized above the anastomosis due to overlying bowel gas.   Patent below the anastomosis.    Other: Partially visualized small volume complex/loculated ascites in  the right lower quadrant.      Impression    IMPRESSION:   1. Normal grayscale evaluation of the right lower quadrant transplant  kidney.  2. Partially limited Doppler evaluation due to overlying bowel gas.  The visualized renal transplant vasculature is patent.  3. Complex ascites.    I have personally reviewed the examination and initial interpretation  and I agree with the findings.    RIKI OSPINA DO         SYSTEM ID:  [id number]   XR Chest Port 1 View    Narrative    EXAM: XR CHEST PORT 1 VIEW 12/10/2023 1:48 PM    DEMOGRAPHICS: Male of 68 years    INDICATION: pneumo eval    COMPARISON: 12/9/2023, 12/8/2023.    TECHNIQUE: Single portable AP view of the chest.    FINDINGS:   New endotracheal tube tip projects at the mid thoracic trachea.  Gastric tube side port projects at the stomach. Low lung volumes.  Improved aeration of the left lung base. Stable appearance of the  mediastinum and cardiac silhouette. Silhouetting of the left  hemidiaphragm and dense retrocardiac opacity. Small right pleural  effusion. No pneumothorax. Upper abdomen is unremarkable with stable  position of surgical material.      Impression    IMPRESSION:   1.  The  endotracheal tube tip projects at the mid thoracic trachea.  2.  Low lung volumes with elements of atelectasis. No pneumothorax.  3.  Continued left lower lobe atelectasis and unchanged small right  pleural effusion.    I have personally reviewed the examination and initial interpretation  and I agree with the findings.    HEBERT MAYERS MD         SYSTEM ID:  [id number]   XR Abdomen Port 1 View    Narrative    EXAMINATION:  XR ABDOMEN PORT 1 VIEW 12/10/2023     COMPARISON: CT abdomen/pelvis 12/9/2023    HISTORY: Verify NG    TECHNIQUE: Frontal supine view of the abdomen.    FINDINGS: Gastric tube tip and side-port project over the region of  the stomach. New partially visualized intraperitoneal drain over the  right lower quadrant. No abnormally dilated loops of bowel, free air,  or pneumatosis in the visualized abdomen. No portal venous gas.       Impression    IMPRESSION:   1. Gastric tube tip and side-port project over the stomach.  2. New right lower quadrant intraperitoneal drain is partially  visualized.  3. Nonobstructive bowel gas pattern.    I have personally reviewed the examination and initial interpretation  and I agree with the findings.    HEBERT MAYERS MD         SYSTEM ID:  [id number]

## 2023-12-11 NOTE — PROGRESS NOTES
The team will wait for tomorrow 12/11/23 to talk referring PICC placement to IR as per nurse assigned.

## 2023-12-11 NOTE — PLAN OF CARE
Major Shift Events:  Pt denies pain, restless, dex started. Maps subsequently dropped to low sixties, maps improved after dex stopped. Art line not working team aware. Pt lethargic but follows commands and BUNN. CMV settings.Thin inline secretions, fair cough. NG to LIS, pt strict NPO per transplant. Hypoactive bowel sounds, no BM. Lux w/ output 50-60ml/hr. Abdominal incision drainage marked/extended, first dressing to be removed by team. Coccyx intact and blanchable. L trip fem w/ amio running, TPN, and tko w/ abx. PIV running with LR.     Gtts: Amio 0.5, TPN at 40ml/hr, and LR 30ml/hr. Dex stopped @ 0230. Heparin held until morning per transplant MD.     Plan: Possible extubation today, continue goals of care.     For vital signs and complete assessments, please see documentation flowsheets.

## 2023-12-11 NOTE — PROGRESS NOTES
Transplant Surgery  Inpatient Daily Progress Note  12/11/2023    Assessment & Plan: Damion Quinones is a 68 year old male with a history of PAF, obesity, HTN, pre-diabetes, rheumatoid and psoriatic arthritis, CAD, and cirrhosis (alcohol + hemochromatosis) and 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 He underwent a redo mesh repair of umbilical hernia and minimal DHEERAJ of small bowel 12/7/23 with Dr. Clifton. Presented from home on POD1 after LLQ pain. Now s/p RTOR on 12/10 with repaired enterotomy and abdominal wash out.    s/p 12/7/23 Redo mesh repair of umbilical hernia and minimal DHEERAJ of small bowel: POD#3.   s/p 12/10/23 Ex lap, repair of SB perforation, abdominal wash out  - NPO, TPN for 7 days. Upper GI thereafter and start PO if okay per operating surgeon  - zosyn/diflucan  - follow intra-op cultures, + yeast     Hx SLK 6/6/23:  Liver: LFTs WNL.   Kidney: Cr  baseline ~0.8-1. Low UO on admission. IVF resuscitation. Lux placed to monitor output.   PATI secondary to sepsis/SIRS: Cr now 1.8. Monitor UO.      Immunosuppressed status secondary to medications:   Maintenance:    - was on PTA Myfortic 540 mg BID -> now to  mg susp   - Tacrolimus 1.5 mg BID. Goal level 6-8. 11/21 level 11.4. Dose reduced to 1.5 mg BID. No repeat level. Diflucan started d/t concern for infection. Start tac SL due to concern for absorption and reduce dose due to diflucan. Tac 0.5 SL BID.    - PTA Prednisone 5 mg on hold due to NPO. 4 Methylpred     Neuro:  Peripheral neuropathy: Follows with Neurology. PTA gabapentin, capsaicin cream. Holding orals  Acute post-op pain: LLQ abdominal pain.   - PRN IV Dilaudid while NPO     Hematology:   Hemo concentrated Hgb of 17.1 on 12/8. Hgb 10.2 post op, monitor  Chronic anticoagulation for Afib and DVT/PE ppx: PTA apixaban. Held starting 12/3 prior to procedure, not restarted post procedure. Heparin gtt low intensity started 12/9.   B/l internal jugular thormbus noted in  OR: US today to follow up, once hgb stable can start high intensity heparin      Cardiorespiratory:   Hx CAD: PTA atorvastatin.   PAF with RVR and hypotension: PTA metoprolol 50 mg BID. PTA apixaban on HOLD for now. Cardiology consulted earlier this admission due to uncontrolled afib. Received digoxin x2 then amiodorone ggt. Plan per sicu to trial metoprolol today  Hypotension: improved, LA normalized. Likely 2/2 abdominal sepsis  Respiratory insufficiency: Secondary to pulmonary edema. Monitor. Extubated 12/11 without difficulty.   Tachycardia: imrpoved     GI/Nutrition: TPN. NPO, await UGI on POD7. NGT to LIS. Dulcolax supp     Endocrine:   Hypothyroidism: PTA levothyroxine.  Sugars: sliding scale insulin      Fluid/Electrolytes:   PATI: see above  Hypomagnesemia: IV mag replacement while NPO. PTA Mag-Ox 800 mg BID.      : see kidney graft function, keep stauffer     Infectious disease: afebrile. No leukocytosis.   Intradbominal sepsis: improved since OR. Lactate improved.     Rheum:  Hx Gout: Follows with Rheumatology. Orals on hold. PTA allopurinol, prednisone, PRN anakinra.      Prophylaxis: DVT (hep ggt, restart apixiban when able), fall, GI (PPI), pneumocystis (Bactrim),      Disposition: sicu, okay for floor this afternoon if stable    GEORGE/Fellow/Resident Provider:   Sandro Ratliff MD PGY3  General Surgery Resident      Faculty: Dr. Dc    __________________________________________________________________  Transplant History: Admitted 12/8/2023 for abdominal pain.   6/6/2023 (Liver), 6/6/2023 (Kidney), Postoperative day: 188 (Liver), 188 (Kidney)     Interval History: History is obtained from the patient  Overnight events:   Weaned off pressors overnight. Alert this morning. States his pain is better and overall feels better then when he presented    ROS:   A 10-point review of systems was negative except as noted above.    Curent Meds:   [Held by provider] allopurinol  300 mg Oral or Feeding Tube Daily     [Held by provider] atorvastatin  40 mg Oral or Feeding Tube QPM    bisacodyl  10 mg Rectal Daily    fluconazole  200 mg Intravenous Q24H    [Held by provider] gabapentin  100 mg Oral or Feeding Tube BID    [Held by provider] gabapentin  200 mg Oral or Feeding Tube Daily    insulin aspart  1-12 Units Subcutaneous Q4H    levothyroxine  70 mcg Intravenous Daily    [Held by provider] levothyroxine  88 mcg Oral or Feeding Tube Daily    lipids plant base  250 mL Intravenous Once per day on Monday Tuesday Wednesday Thursday Friday Saturday    methylPREDNISolone  4 mg Intravenous Daily    [Held by provider] metoprolol tartrate  50 mg Oral BID    [Held by provider] mycophenolate  750 mg Oral or NG Tube BID IS    mycophenolate mofetil  500 mg Intravenous BID    [Held by provider] pantoprazole  40 mg Oral QAM AC    pantoprazole  40 mg Intravenous Daily with breakfast    piperacillin-tazobactam  3.375 g Intravenous Q6H    [Held by provider] predniSONE  5 mg Oral or Feeding Tube Daily    [Held by provider] senna-docusate  2 tablet Oral or Feeding Tube BID    [Held by provider] sodium chloride (PF)  3 mL Intracatheter Q8H    [Held by provider] sulfamethoxazole-trimethoprim  1 tablet Oral or Feeding Tube Daily    tacrolimus  0.5 mg Sublingual BID IS       Physical Exam:     Admit      Current Vitals:   /66   Pulse 87   Temp 97.4  F (36.3  C) (Axillary)   Resp 19   Wt 111.5 kg (245 lb 13 oz)   SpO2 95%   BMI 38.50 kg/m           Vital sign ranges:    Temp:  [96.7  F (35.9  C)-98.8  F (37.1  C)] 97.4  F (36.3  C)  Pulse:  [] 87  Resp:  [15-28] 19  BP: ()/(49-86) 109/66  MAP:  [61 mmHg-207 mmHg] 175 mmHg  Arterial Line BP: ()/() 93/83  FiO2 (%):  [40 %-50 %] 40 %  SpO2:  [91 %-99 %] 95 %  Patient Vitals for the past 24 hrs:   BP Temp Temp src Pulse Resp SpO2 Weight   12/11/23 1030 -- -- -- 87 19 95 % --   12/11/23 1000 109/66 -- -- 83 19 95 % --   12/11/23 0930 104/61 -- -- 85 20 96 % --   12/11/23  0900 127/49 -- -- 85 19 94 % --   12/11/23 0845 120/72 -- -- 88 21 92 % --   12/11/23 0800 108/65 97.4  F (36.3  C) Axillary 79 19 96 % --   12/11/23 0700 119/76 -- -- 94 25 96 % --   12/11/23 0600 108/76 -- -- 75 16 97 % --   12/11/23 0534 112/70 -- -- 69 -- 97 % --   12/11/23 0500 106/69 -- -- 66 24 95 % --   12/11/23 0430 97/65 -- -- -- -- -- --   12/11/23 0415 91/62 -- -- -- -- -- --   12/11/23 0400 (!) 86/56 98.8  F (37.1  C) Axillary 78 16 99 % 111.5 kg (245 lb 13 oz)   12/11/23 0345 95/69 -- -- -- -- -- --   12/11/23 0330 (!) 88/61 -- -- -- -- -- --   12/11/23 0315 92/59 -- -- -- -- -- --   12/11/23 0300 102/73 -- -- 69 17 99 % --   12/11/23 0230 (!) 80/53 -- -- 60 15 97 % --   12/11/23 0225 (!) 85/59 -- -- 61 16 97 % --   12/11/23 0215 (!) 83/60 -- -- 64 16 97 % --   12/11/23 0200 (!) 86/59 -- -- -- -- -- --   12/11/23 0115 101/63 -- -- -- -- -- --   12/11/23 0100 121/78 -- -- 87 16 95 % --   12/11/23 0010 -- -- -- 93 -- 97 % --   12/11/23 0000 117/80 98.1  F (36.7  C) Axillary 86 28 96 % --   12/10/23 2300 118/72 -- -- 92 16 96 % --   12/10/23 2108 -- -- -- 94 -- 97 % --   12/10/23 2100 132/81 -- -- 101 22 97 % --   12/10/23 2015 (!) 142/82 -- -- 93 16 -- --   12/10/23 2000 (!) 142/86 97.7  F (36.5  C) Axillary 94 20 95 % --   12/10/23 1901 110/73 -- -- 102 16 91 % --   12/10/23 1900 110/73 -- -- 94 16 91 % --   12/10/23 1800 -- -- -- 114 16 93 % --   12/10/23 1700 -- -- -- (!) 123 26 95 % --   12/10/23 1600 -- 98  F (36.7  C) Axillary 98 16 96 % --   12/10/23 1500 -- -- -- 97 16 95 % --   12/10/23 1400 -- -- -- 100 16 97 % --   12/10/23 1345 -- 96.8  F (36  C) Axillary 97 16 97 % --   12/10/23 1330 -- -- -- 101 16 -- --   12/10/23 1315 -- -- -- 93 17 93 % --   12/10/23 1300 -- (!) 96.7  F (35.9  C) Axillary -- -- 98 % --       /66   Pulse 87   Temp 97.4  F (36.3  C) (Axillary)   Resp 19   Wt 111.5 kg (245 lb 13 oz)   SpO2 95%   BMI 38.50 kg/m    General Appearance: alert, vented  Respiratory:  vented  Cardiovascular: afib  GI: abdomen soft, appropriately tender over incision, mildly distended. No rebound or guarding. Incision covered with dry strikethrough. Drains x3 with s/s output  Genitourinary: stauffer  Skin: warm, dry  Musculoskeletal: BLE pedal edema  Neurologic: A&OX4. No confusion  Psychiatric: calm, behavior appropriate to situation    Frailty Scores          12/27/2022 11/22/2022   Frailty Scores   Final Score Frail Frail   Final Score Number 3 4       Data:   CMP  Recent Labs   Lab 12/11/23  0750 12/11/23  0512 12/11/23  0327 12/10/23  1415 12/10/23  1408 12/09/23  0159 12/08/23 2002   NA  --   --  138  --  138   < > 139   POTASSIUM  --   --  4.0  --  4.2   < > 4.2   CHLORIDE  --   --  106  --  105   < >  --    CO2  --   --  22  --  18*   < >  --    * 195* 214*   < > 131*   < > 165*   BUN  --   --  35.7*  --  30.7*   < >  --    CR  --   --  1.83*  --  1.71*   < >  --    GFRESTIMATED  --   --  40*  --  44*   < >  --    NAZARIO  --   --  7.4*  --  7.4*   < >  --    ICAW  --   --   --   --  4.1*  --  4.9   MAG  --   --  2.1  --  1.8   < >  --    PHOS  --   --  2.4*  --  3.5   < >  --    ALBUMIN  --   --  2.7*  --  2.9*   < >  --    BILITOTAL  --   --  1.1  --  1.5*   < >  --    ALKPHOS  --   --  59  --  59   < >  --    AST  --   --  24  --  23   < >  --    ALT  --   --  9  --  7   < >  --     < > = values in this interval not displayed.     CBC  Recent Labs   Lab 12/11/23  0327 12/10/23  1408   HGB 10.2* 12.1*   WBC 5.5 5.9   * 128*     COAGS  Recent Labs   Lab 12/11/23  0327 12/10/23  1408   INR 1.92* 2.29*   PTT 40* 40*      Urinalysis  Recent Labs   Lab Test 12/09/23  1405 11/07/23  0753   COLOR Yellow Yellow   APPEARANCE Clear Clear   URINEGLC Negative Negative   URINEBILI Negative Small*   URINEKETONE Negative Negative   SG 1.022 1.015   UBLD Negative Negative   URINEPH 5.0 7.0   PROTEIN 10* Trace*   NITRITE Negative Negative   LEUKEST Negative Trace*   RBCU 2 0-2   WBCU 6* 10-25*      Virology:  Hepatitis C Antibody   Date Value Ref Range Status   06/05/2023 Nonreactive Nonreactive Final     BK Virus DNA copies/mL   Date Value Ref Range Status   10/05/2023 Not Detected Not Detected copies/mL Final   09/05/2023 Not Detected Not Detected copies/mL Final   08/29/2023 Not Detected Not Detected copies/mL Final   08/21/2023 Not Detected Not Detected copies/mL Final   08/17/2023 Not Detected Not Detected copies/mL Final   08/01/2023 Not Detected Not Detected copies/mL Final   07/31/2023 Not Detected Not Detected copies/mL Final

## 2023-12-12 ENCOUNTER — APPOINTMENT (OUTPATIENT)
Dept: INTERVENTIONAL RADIOLOGY/VASCULAR | Facility: CLINIC | Age: 68
End: 2023-12-12
Attending: PHYSICIAN ASSISTANT
Payer: COMMERCIAL

## 2023-12-12 ENCOUNTER — APPOINTMENT (OUTPATIENT)
Dept: OCCUPATIONAL THERAPY | Facility: CLINIC | Age: 68
End: 2023-12-12
Attending: STUDENT IN AN ORGANIZED HEALTH CARE EDUCATION/TRAINING PROGRAM
Payer: COMMERCIAL

## 2023-12-12 ENCOUNTER — APPOINTMENT (OUTPATIENT)
Dept: GENERAL RADIOLOGY | Facility: CLINIC | Age: 68
End: 2023-12-12
Attending: STUDENT IN AN ORGANIZED HEALTH CARE EDUCATION/TRAINING PROGRAM
Payer: COMMERCIAL

## 2023-12-12 ENCOUNTER — TELEPHONE (OUTPATIENT)
Dept: TRANSPLANT | Facility: CLINIC | Age: 68
End: 2023-12-12
Payer: MEDICARE

## 2023-12-12 LAB
ALBUMIN SERPL BCG-MCNC: 2.8 G/DL (ref 3.5–5.2)
ALP SERPL-CCNC: 70 U/L (ref 40–150)
ALT SERPL W P-5'-P-CCNC: 11 U/L (ref 0–70)
ANION GAP SERPL CALCULATED.3IONS-SCNC: 12 MMOL/L (ref 7–15)
AST SERPL W P-5'-P-CCNC: 24 U/L (ref 0–45)
BACTERIA SPEC CULT: ABNORMAL
BILIRUB DIRECT SERPL-MCNC: 0.41 MG/DL (ref 0–0.3)
BILIRUB SERPL-MCNC: 0.6 MG/DL
BUN SERPL-MCNC: 40.1 MG/DL (ref 8–23)
CALCIUM SERPL-MCNC: 8 MG/DL (ref 8.8–10.2)
CHLORIDE SERPL-SCNC: 108 MMOL/L (ref 98–107)
CREAT SERPL-MCNC: 1.42 MG/DL (ref 0.67–1.17)
DEPRECATED HCO3 PLAS-SCNC: 21 MMOL/L (ref 22–29)
EGFRCR SERPLBLD CKD-EPI 2021: 54 ML/MIN/1.73M2
ERYTHROCYTE [DISTWIDTH] IN BLOOD BY AUTOMATED COUNT: 17.3 % (ref 10–15)
GLUCOSE BLDC GLUCOMTR-MCNC: 160 MG/DL (ref 70–99)
GLUCOSE BLDC GLUCOMTR-MCNC: 161 MG/DL (ref 70–99)
GLUCOSE BLDC GLUCOMTR-MCNC: 171 MG/DL (ref 70–99)
GLUCOSE BLDC GLUCOMTR-MCNC: 181 MG/DL (ref 70–99)
GLUCOSE BLDC GLUCOMTR-MCNC: 209 MG/DL (ref 70–99)
GLUCOSE SERPL-MCNC: 170 MG/DL (ref 70–99)
HCT VFR BLD AUTO: 33.2 % (ref 40–53)
HGB BLD-MCNC: 10.2 G/DL (ref 13.3–17.7)
MAGNESIUM SERPL-MCNC: 2.3 MG/DL (ref 1.7–2.3)
MCH RBC QN AUTO: 28.7 PG (ref 26.5–33)
MCHC RBC AUTO-ENTMCNC: 30.7 G/DL (ref 31.5–36.5)
MCV RBC AUTO: 93 FL (ref 78–100)
PHOSPHATE SERPL-MCNC: 2.8 MG/DL (ref 2.5–4.5)
PLATELET # BLD AUTO: 123 10E3/UL (ref 150–450)
POTASSIUM SERPL-SCNC: 3.8 MMOL/L (ref 3.4–5.3)
PROT SERPL-MCNC: 5.1 G/DL (ref 6.4–8.3)
RBC # BLD AUTO: 3.56 10E6/UL (ref 4.4–5.9)
SODIUM SERPL-SCNC: 141 MMOL/L (ref 135–145)
UFH PPP CHRO-ACNC: 0.17 IU/ML
UFH PPP CHRO-ACNC: <0.1 IU/ML
WBC # BLD AUTO: 9.2 10E3/UL (ref 4–11)

## 2023-12-12 PROCEDURE — C1769 GUIDE WIRE: HCPCS

## 2023-12-12 PROCEDURE — B4185 PARENTERAL SOL 10 GM LIPIDS: HCPCS | Mod: JZ | Performed by: SURGERY

## 2023-12-12 PROCEDURE — 250N000009 HC RX 250: Performed by: STUDENT IN AN ORGANIZED HEALTH CARE EDUCATION/TRAINING PROGRAM

## 2023-12-12 PROCEDURE — 74018 RADEX ABDOMEN 1 VIEW: CPT

## 2023-12-12 PROCEDURE — 99233 SBSQ HOSP IP/OBS HIGH 50: CPT | Performed by: INTERNAL MEDICINE

## 2023-12-12 PROCEDURE — 250N000011 HC RX IP 250 OP 636: Mod: JZ | Performed by: STUDENT IN AN ORGANIZED HEALTH CARE EDUCATION/TRAINING PROGRAM

## 2023-12-12 PROCEDURE — 250N000009 HC RX 250: Performed by: SURGERY

## 2023-12-12 PROCEDURE — 74018 RADEX ABDOMEN 1 VIEW: CPT | Mod: 26 | Performed by: STUDENT IN AN ORGANIZED HEALTH CARE EDUCATION/TRAINING PROGRAM

## 2023-12-12 PROCEDURE — 83735 ASSAY OF MAGNESIUM: CPT | Performed by: NURSE PRACTITIONER

## 2023-12-12 PROCEDURE — 80053 COMPREHEN METABOLIC PANEL: CPT | Performed by: STUDENT IN AN ORGANIZED HEALTH CARE EDUCATION/TRAINING PROGRAM

## 2023-12-12 PROCEDURE — 85014 HEMATOCRIT: CPT | Performed by: STUDENT IN AN ORGANIZED HEALTH CARE EDUCATION/TRAINING PROGRAM

## 2023-12-12 PROCEDURE — 250N000011 HC RX IP 250 OP 636

## 2023-12-12 PROCEDURE — 84100 ASSAY OF PHOSPHORUS: CPT | Performed by: NURSE PRACTITIONER

## 2023-12-12 PROCEDURE — 272N000195 HC ACCESSORY CR4

## 2023-12-12 PROCEDURE — 97165 OT EVAL LOW COMPLEX 30 MIN: CPT | Mod: GO

## 2023-12-12 PROCEDURE — 99233 SBSQ HOSP IP/OBS HIGH 50: CPT | Performed by: STUDENT IN AN ORGANIZED HEALTH CARE EDUCATION/TRAINING PROGRAM

## 2023-12-12 PROCEDURE — 250N000011 HC RX IP 250 OP 636: Performed by: STUDENT IN AN ORGANIZED HEALTH CARE EDUCATION/TRAINING PROGRAM

## 2023-12-12 PROCEDURE — 71045 X-RAY EXAM CHEST 1 VIEW: CPT | Mod: 26 | Performed by: RADIOLOGY

## 2023-12-12 PROCEDURE — 77001 FLUOROGUIDE FOR VEIN DEVICE: CPT | Mod: 26 | Performed by: STUDENT IN AN ORGANIZED HEALTH CARE EDUCATION/TRAINING PROGRAM

## 2023-12-12 PROCEDURE — 250N000009 HC RX 250

## 2023-12-12 PROCEDURE — 258N000003 HC RX IP 258 OP 636: Performed by: STUDENT IN AN ORGANIZED HEALTH CARE EDUCATION/TRAINING PROGRAM

## 2023-12-12 PROCEDURE — 85520 HEPARIN ASSAY: CPT | Performed by: SURGERY

## 2023-12-12 PROCEDURE — 97530 THERAPEUTIC ACTIVITIES: CPT | Mod: GO

## 2023-12-12 PROCEDURE — 250N000011 HC RX IP 250 OP 636: Performed by: PHYSICIAN ASSISTANT

## 2023-12-12 PROCEDURE — 272N000192 HC ACCESSORY CR2

## 2023-12-12 PROCEDURE — 250N000011 HC RX IP 250 OP 636: Mod: JZ | Performed by: NURSE PRACTITIONER

## 2023-12-12 PROCEDURE — C1751 CATH, INF, PER/CENT/MIDLINE: HCPCS

## 2023-12-12 PROCEDURE — 999N000065 XR CHEST PORT 1 VIEW

## 2023-12-12 PROCEDURE — 76937 US GUIDE VASCULAR ACCESS: CPT

## 2023-12-12 PROCEDURE — 200N000002 HC R&B ICU UMMC

## 2023-12-12 PROCEDURE — 250N000011 HC RX IP 250 OP 636: Mod: JZ | Performed by: SURGERY

## 2023-12-12 PROCEDURE — C9113 INJ PANTOPRAZOLE SODIUM, VIA: HCPCS

## 2023-12-12 PROCEDURE — 76937 US GUIDE VASCULAR ACCESS: CPT | Mod: 26 | Performed by: STUDENT IN AN ORGANIZED HEALTH CARE EDUCATION/TRAINING PROGRAM

## 2023-12-12 PROCEDURE — 272N000504 HC NEEDLE CR4

## 2023-12-12 PROCEDURE — 97535 SELF CARE MNGMENT TRAINING: CPT | Mod: GO

## 2023-12-12 PROCEDURE — 99152 MOD SED SAME PHYS/QHP 5/>YRS: CPT | Mod: GC | Performed by: STUDENT IN AN ORGANIZED HEALTH CARE EDUCATION/TRAINING PROGRAM

## 2023-12-12 PROCEDURE — C9113 INJ PANTOPRAZOLE SODIUM, VIA: HCPCS | Performed by: STUDENT IN AN ORGANIZED HEALTH CARE EDUCATION/TRAINING PROGRAM

## 2023-12-12 PROCEDURE — 250N000012 HC RX MED GY IP 250 OP 636 PS 637: Performed by: PHYSICIAN ASSISTANT

## 2023-12-12 PROCEDURE — 36558 INSERT TUNNELED CV CATH: CPT | Mod: GC | Performed by: STUDENT IN AN ORGANIZED HEALTH CARE EDUCATION/TRAINING PROGRAM

## 2023-12-12 PROCEDURE — 85520 HEPARIN ASSAY: CPT | Performed by: TRANSPLANT SURGERY

## 2023-12-12 PROCEDURE — 82248 BILIRUBIN DIRECT: CPT | Performed by: NURSE PRACTITIONER

## 2023-12-12 RX ORDER — FUROSEMIDE 10 MG/ML
40 INJECTION INTRAMUSCULAR; INTRAVENOUS ONCE
Status: COMPLETED | OUTPATIENT
Start: 2023-12-12 | End: 2023-12-12

## 2023-12-12 RX ORDER — BISACODYL 10 MG
10 SUPPOSITORY, RECTAL RECTAL DAILY PRN
Status: DISCONTINUED | OUTPATIENT
Start: 2023-12-12 | End: 2023-12-20 | Stop reason: HOSPADM

## 2023-12-12 RX ORDER — HYDRALAZINE HYDROCHLORIDE 20 MG/ML
10 INJECTION INTRAMUSCULAR; INTRAVENOUS EVERY 6 HOURS PRN
Status: DISCONTINUED | OUTPATIENT
Start: 2023-12-12 | End: 2023-12-19

## 2023-12-12 RX ORDER — ONDANSETRON 2 MG/ML
4 INJECTION INTRAMUSCULAR; INTRAVENOUS
Status: DISCONTINUED | OUTPATIENT
Start: 2023-12-12 | End: 2023-12-12

## 2023-12-12 RX ORDER — FENTANYL CITRATE 50 UG/ML
25-50 INJECTION, SOLUTION INTRAMUSCULAR; INTRAVENOUS EVERY 5 MIN PRN
Status: DISCONTINUED | OUTPATIENT
Start: 2023-12-12 | End: 2023-12-12

## 2023-12-12 RX ORDER — NALOXONE HYDROCHLORIDE 0.4 MG/ML
0.2 INJECTION, SOLUTION INTRAMUSCULAR; INTRAVENOUS; SUBCUTANEOUS
Status: DISCONTINUED | OUTPATIENT
Start: 2023-12-12 | End: 2023-12-12

## 2023-12-12 RX ORDER — NALOXONE HYDROCHLORIDE 0.4 MG/ML
0.4 INJECTION, SOLUTION INTRAMUSCULAR; INTRAVENOUS; SUBCUTANEOUS
Status: DISCONTINUED | OUTPATIENT
Start: 2023-12-12 | End: 2023-12-12

## 2023-12-12 RX ORDER — TACROLIMUS 0.5 MG/1
0.5 CAPSULE ORAL
Status: DISCONTINUED | OUTPATIENT
Start: 2023-12-13 | End: 2023-12-13

## 2023-12-12 RX ORDER — FLUCONAZOLE 2 MG/ML
400 INJECTION, SOLUTION INTRAVENOUS EVERY 24 HOURS
Status: DISCONTINUED | OUTPATIENT
Start: 2023-12-12 | End: 2023-12-19

## 2023-12-12 RX ORDER — HEPARIN SODIUM,PORCINE 10 UNIT/ML
5 VIAL (ML) INTRAVENOUS
Status: COMPLETED | OUTPATIENT
Start: 2023-12-12 | End: 2023-12-12

## 2023-12-12 RX ADMIN — METOPROLOL TARTRATE 5 MG: 5 INJECTION INTRAVENOUS at 05:01

## 2023-12-12 RX ADMIN — SODIUM CHLORIDE, PRESERVATIVE FREE 4 ML: 5 INJECTION INTRAVENOUS at 14:25

## 2023-12-12 RX ADMIN — POTASSIUM PHOSPHATE, MONOBASIC POTASSIUM PHOSPHATE, DIBASIC: 224; 236 INJECTION, SOLUTION, CONCENTRATE INTRAVENOUS at 20:37

## 2023-12-12 RX ADMIN — PANTOPRAZOLE SODIUM 40 MG: 40 INJECTION, POWDER, FOR SOLUTION INTRAVENOUS at 08:32

## 2023-12-12 RX ADMIN — HEPARIN SODIUM 1850 UNITS/HR: 10000 INJECTION, SOLUTION INTRAVENOUS at 10:18

## 2023-12-12 RX ADMIN — PANTOPRAZOLE SODIUM 40 MG: 40 INJECTION, POWDER, FOR SOLUTION INTRAVENOUS at 20:32

## 2023-12-12 RX ADMIN — PIPERACILLIN AND TAZOBACTAM 3.38 G: 3; .375 INJECTION, POWDER, LYOPHILIZED, FOR SOLUTION INTRAVENOUS at 08:32

## 2023-12-12 RX ADMIN — FLUCONAZOLE IN SODIUM CHLORIDE 400 MG: 2 INJECTION, SOLUTION INTRAVENOUS at 12:57

## 2023-12-12 RX ADMIN — METHYLPREDNISOLONE SODIUM SUCCINATE 4 MG: 40 INJECTION INTRAMUSCULAR; INTRAVENOUS at 08:31

## 2023-12-12 RX ADMIN — MYCOPHENOLATE MOFETIL 500 MG: 500 INJECTION, POWDER, LYOPHILIZED, FOR SOLUTION INTRAVENOUS at 21:06

## 2023-12-12 RX ADMIN — FUROSEMIDE 40 MG: 10 INJECTION, SOLUTION INTRAVENOUS at 14:54

## 2023-12-12 RX ADMIN — LEVOTHYROXINE SODIUM ANHYDROUS 70 MCG: 100 INJECTION, POWDER, LYOPHILIZED, FOR SOLUTION INTRAVENOUS at 10:02

## 2023-12-12 RX ADMIN — MYCOPHENOLATE MOFETIL 500 MG: 500 INJECTION, POWDER, LYOPHILIZED, FOR SOLUTION INTRAVENOUS at 08:46

## 2023-12-12 RX ADMIN — METOPROLOL TARTRATE 5 MG: 5 INJECTION INTRAVENOUS at 10:02

## 2023-12-12 RX ADMIN — LIDOCAINE HYDROCHLORIDE 8 ML: 10 INJECTION, SOLUTION EPIDURAL; INFILTRATION; INTRACAUDAL; PERINEURAL at 14:23

## 2023-12-12 RX ADMIN — TACROLIMUS 0.5 MG: 0.5 CAPSULE ORAL at 08:46

## 2023-12-12 RX ADMIN — OLIVE OIL AND SOYBEAN OIL 250 ML: 16; 4 INJECTION, EMULSION INTRAVENOUS at 20:36

## 2023-12-12 RX ADMIN — PIPERACILLIN AND TAZOBACTAM 3.38 G: 3; .375 INJECTION, POWDER, LYOPHILIZED, FOR SOLUTION INTRAVENOUS at 02:26

## 2023-12-12 RX ADMIN — FENTANYL CITRATE 50 MCG: 50 INJECTION, SOLUTION INTRAMUSCULAR; INTRAVENOUS at 14:22

## 2023-12-12 RX ADMIN — HEPARIN SODIUM 2000 UNITS/HR: 10000 INJECTION, SOLUTION INTRAVENOUS at 18:13

## 2023-12-12 RX ADMIN — PIPERACILLIN AND TAZOBACTAM 3.38 G: 3; .375 INJECTION, POWDER, LYOPHILIZED, FOR SOLUTION INTRAVENOUS at 20:28

## 2023-12-12 RX ADMIN — PIPERACILLIN AND TAZOBACTAM 3.38 G: 3; .375 INJECTION, POWDER, LYOPHILIZED, FOR SOLUTION INTRAVENOUS at 14:53

## 2023-12-12 RX ADMIN — HEPARIN SODIUM 1850 UNITS/HR: 10000 INJECTION, SOLUTION INTRAVENOUS at 15:53

## 2023-12-12 ASSESSMENT — ACTIVITIES OF DAILY LIVING (ADL)
ADLS_ACUITY_SCORE: 34
ADLS_ACUITY_SCORE: 31
ADLS_ACUITY_SCORE: 34
ADLS_ACUITY_SCORE: 36
ADLS_ACUITY_SCORE: 36
ADLS_ACUITY_SCORE: 31
ADLS_ACUITY_SCORE: 33

## 2023-12-12 ASSESSMENT — ENCOUNTER SYMPTOMS: NEW SYMPTOMS OF CORONARY ARTERY DISEASE: 0

## 2023-12-12 NOTE — PROGRESS NOTES
Transplant Surgery  Inpatient Daily Progress Note  12/12/2023    Assessment & Plan: Damion Quinones is a 68 year old male with a history of PAF, obesity, HTN, pre-diabetes, rheumatoid and psoriatic arthritis, CAD, and cirrhosis (alcohol + hemochromatosis) and 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 He underwent a redo mesh repair of umbilical hernia and minimal DHEERAJ of small bowel 12/7/23 with Dr. Clifton. Presented from home on POD1 after LLQ pain. Now s/p RTOR on 12/10 with repaired enterotomy and abdominal wash out.    s/p 12/7/23 Redo mesh repair of umbilical hernia and minimal DHEERAJ of small bowel; RTOR 12/10/23 Ex lap, repair of SB perforation, abdominal wash out:   - NPO with TPN for 7 days. Upper GI thereafter and start PO if okay per operating surgeon   - Zosyn/diflucan   - Follow intra-op cultures, + yeast     Hx SLK 6/6/23:  Liver: LFTs WNL.   Kidney; APTI secondary to sepsis/SIRS: Cr 1.4 (from peak 1.9). Baseline ~0.8-1. Good UOP. Lxu placed to monitor output.    - Lasix 40 mg IV x 1 per nephrology.      Immunosuppressed status secondary to medications:   Maintenance:    - PTA on Myfortic 540 mg BID -> now to  mg IV   - Tacrolimus 1.5 mg BID -> SL Tac 0.5 mg BID. Goal level 6-8. Will hold tonight's dose and recheck tomorrow.    - PTA Prednisone 5 mg on hold due to NPO -> 4 mg Methylpred IV daily.      Neuro:  Peripheral neuropathy: Follows with Neurology. PTA gabapentin, capsaicin cream. Holding orals  Acute post-op pain: LLQ abdominal pain.   - PRN IV Dilaudid while NPO     Hematology:   Acute blood loss anemia: Hgb ~10 post-operatively (previously WNL). Monitor.  Chronic anticoagulation for Afib and DVT/PE ppx: PTA apixaban. Held starting 12/3 prior to procedure, not restarted post procedure.    - Heparin gtt low intensity started 12/9.   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     Cardiorespiratory:   Hx CAD: PTA atorvastatin on hold.  PAF with RVR and hypotension: PTA metoprolol 50 mg BID and PTA apixaban on HOLD for now. Cardiology consulted earlier this admission due to uncontrolled afib. Received digoxin x2 then amiodorone gtt. IV metoprolol initiated and amiodarone off.    - Continue IV metoprolol while NPO.   Hypotension; Tachycardia: LA normalized. Likely 2/2 abdominal sepsis. Resolved.  Respiratory insufficiency: Secondary to pulmonary edema. Extubated 12/11 without difficulty. Now breathing comfortably on 2L NC. Plan for light diuresis today.      GI/Nutrition: TPN. NPO, await UGI on POD7. NGT to LIS.      Endocrine:   Hypothyroidism: PTA levothyroxine -> IV while NPO.  Steroid/stress induced hyperglycemia: Continue sliding scale insulin.     Fluid/Electrolytes: Electrolyte replacements per ICU protocol   Hypomagnesemia: PTA Mag-Ox 800 mg BID on hold.     : See kidney graft function, keep stauffer     Infectious disease:   Intradbominal sepsis: Improved since OR. Lactate improved. Cultures +yeast (Saccharomyces cerevisiae). Afebrile. No leukocytosis.    - Continue fluconazole, Zosyn.      Rheum:  Hx Gout: Follows with Rheumatology. PTA allopurinol, prednisone, PRN anakinra. Orals on hold.      Prophylaxis: DVT (hep ggt, restart apixiban when able), fall, GI (PPI), pneumocystis (Bactrim on hold)     Disposition: SICU, plan for transfer to  once central line placed and femoral removed.     GEORGE/Fellow/Resident Provider: Lizbeth Greer NP [id number]    Faculty: Dr. Dc    __________________________________________________________________  Transplant History: Admitted 12/8/2023 for abdominal pain.   6/6/2023 (Liver), 6/6/2023 (Kidney), Postoperative day: 189 (Liver), 189 (Kidney)     Interval History: History is obtained from the patient  Overnight events: No acute events overnight. Off amiodarone gtt. Patient reports diarrhea started overnight. Complains  of poor sleep overnight.     ROS:   A 10-point review of systems was negative except as noted above.    Curent Meds:   [Held by provider] allopurinol  300 mg Oral or Feeding Tube Daily    [Held by provider] atorvastatin  40 mg Oral or Feeding Tube QPM    fluconazole  400 mg Intravenous Q24H    [Held by provider] gabapentin  100 mg Oral or Feeding Tube BID    [Held by provider] gabapentin  200 mg Oral or Feeding Tube Daily    insulin aspart  1-12 Units Subcutaneous Q4H    levothyroxine  70 mcg Intravenous Daily    [Held by provider] levothyroxine  88 mcg Oral or Feeding Tube Daily    lipids plant base  250 mL Intravenous Once per day on Monday Tuesday Wednesday Thursday Friday Saturday    methylPREDNISolone  4 mg Intravenous Daily    metoprolol  5 mg Intravenous Q6H    [Held by provider] metoprolol tartrate  50 mg Oral BID    [Held by provider] mycophenolate  750 mg Oral or NG Tube BID IS    mycophenolate mofetil  500 mg Intravenous BID    pantoprazole  40 mg Intravenous BID    piperacillin-tazobactam  3.375 g Intravenous Q6H    [Held by provider] predniSONE  5 mg Oral or Feeding Tube Daily    [Held by provider] senna-docusate  2 tablet Oral or Feeding Tube BID    [Held by provider] sodium chloride (PF)  3 mL Intracatheter Q8H    [Held by provider] sulfamethoxazole-trimethoprim  1 tablet Oral or Feeding Tube Daily    tacrolimus  0.5 mg Sublingual BID IS       Physical Exam:     Current Vitals:   BP (!) 137/90   Pulse 67   Temp 98.5  F (36.9  C) (Oral)   Resp 18   Wt 115 kg (253 lb 8.5 oz)   SpO2 96%   BMI 39.71 kg/m      Vital sign ranges:    Temp:  [97.6  F (36.4  C)-98.5  F (36.9  C)] 98.5  F (36.9  C)  Pulse:  [59-92] 67  Resp:  [15-27] 18  BP: (115-153)/(73-95) 137/90  SpO2:  [92 %-97 %] 96 %  Patient Vitals for the past 24 hrs:   BP Temp Temp src Pulse Resp SpO2 Weight   12/12/23 1100 (!) 137/90 -- -- 67 18 96 % --   12/12/23 1000 -- -- -- 71 15 96 % --   12/12/23 0900 -- -- -- 70 21 -- --   12/12/23 0800  (!) 150/89 98.5  F (36.9  C) Oral 81 19 96 % --   12/12/23 0700 135/84 -- -- 69 19 97 % --   12/12/23 0600 (!) 142/84 -- -- 66 19 96 % --   12/12/23 0500 132/80 -- -- 74 22 94 % --   12/12/23 0400 (!) 153/81 97.8  F (36.6  C) Oral 68 19 96 % 115 kg (253 lb 8.5 oz)   12/12/23 0300 (!) 140/81 -- -- 79 18 96 % --   12/12/23 0200 115/73 -- -- 79 25 96 % --   12/12/23 0100 123/76 -- -- 71 17 96 % --   12/12/23 0000 116/85 97.6  F (36.4  C) Oral 68 21 97 % --   12/11/23 2300 121/83 -- -- 59 15 97 % --   12/11/23 2200 (!) 139/95 -- -- 84 16 96 % --   12/11/23 2100 120/79 -- -- 74 18 95 % --   12/11/23 2000 122/79 98.5  F (36.9  C) Oral 69 18 95 % --   12/11/23 1900 136/88 -- -- 72 20 97 % --   12/11/23 1700 134/78 -- -- 64 17 96 % --   12/11/23 1600 139/78 98.4  F (36.9  C) Oral 82 19 96 % --   12/11/23 1500 131/83 -- -- 89 27 95 % --   12/11/23 1400 (!) 146/89 -- -- 92 20 97 % --   12/11/23 1300 (!) 140/79 -- -- 92 22 92 % --       BP (!) 137/90   Pulse 67   Temp 98.5  F (36.9  C) (Oral)   Resp 18   Wt 115 kg (253 lb 8.5 oz)   SpO2 96%   BMI 39.71 kg/m    General Appearance: in no apparent distress  Respiratory: unlabored on 2L NC  Cardiovascular: afib  GI: abdomen soft, appropriately tender over incision, mildly distended. No rebound or guarding. Incision covered. Drains x3 with s/s output  Genitourinary: Lux with clear yellow UOP  Skin: warm, dry  Musculoskeletal: BLE pedal edema  Neurologic: A&OX4. No confusion  Psychiatric: calm, behavior appropriate to situation    Frailty Scores          12/27/2022 11/22/2022   Frailty Scores   Final Score Frail Frail   Final Score Number 3 4       Data:   CMP  Recent Labs   Lab 12/12/23  0843 12/12/23  0358 12/12/23  0355 12/11/23  0512 12/11/23  0327 12/10/23  1415 12/10/23  1408 12/09/23  0159 12/08/23 2002   NA  --   --  141  --  138  --  138   < > 139   POTASSIUM  --   --  3.8  --  4.0  --  4.2   < > 4.2   CHLORIDE  --   --  108*  --  106  --  105   < >  --    CO2  --    --  21*  --  22  --  18*   < >  --    * 161* 170*   < > 214*   < > 131*   < > 165*   BUN  --   --  40.1*  --  35.7*  --  30.7*   < >  --    CR  --   --  1.42*  --  1.83*  --  1.71*   < >  --    GFRESTIMATED  --   --  54*  --  40*  --  44*   < >  --    NAZARIO  --   --  8.0*  --  7.4*  --  7.4*   < >  --    ICAW  --   --   --   --   --   --  4.1*  --  4.9   MAG  --   --  2.3  --  2.1  --  1.8   < >  --    PHOS  --   --  2.8  --  2.4*  --  3.5   < >  --    ALBUMIN  --   --  2.8*  --  2.7*  --  2.9*   < >  --    BILITOTAL  --   --  0.6  --  1.1  --  1.5*   < >  --    ALKPHOS  --   --  70  --  59  --  59   < >  --    AST  --   --  24  --  24  --  23   < >  --    ALT  --   --  11  --  9  --  7   < >  --     < > = values in this interval not displayed.     CBC  Recent Labs   Lab 12/12/23  0355 12/11/23  0327   HGB 10.2* 10.2*   WBC 9.2 5.5   * 124*     COAGS  Recent Labs   Lab 12/11/23  0327 12/10/23  1408   INR 1.92* 2.29*   PTT 40* 40*      Urinalysis  Recent Labs   Lab Test 12/09/23  1405 11/07/23  0753   COLOR Yellow Yellow   APPEARANCE Clear Clear   URINEGLC Negative Negative   URINEBILI Negative Small*   URINEKETONE Negative Negative   SG 1.022 1.015   UBLD Negative Negative   URINEPH 5.0 7.0   PROTEIN 10* Trace*   NITRITE Negative Negative   LEUKEST Negative Trace*   RBCU 2 0-2   WBCU 6* 10-25*     Virology:  Hepatitis C Antibody   Date Value Ref Range Status   06/05/2023 Nonreactive Nonreactive Final     BK Virus DNA copies/mL   Date Value Ref Range Status   10/05/2023 Not Detected Not Detected copies/mL Final   09/05/2023 Not Detected Not Detected copies/mL Final   08/29/2023 Not Detected Not Detected copies/mL Final   08/21/2023 Not Detected Not Detected copies/mL Final   08/17/2023 Not Detected Not Detected copies/mL Final   08/01/2023 Not Detected Not Detected copies/mL Final   07/31/2023 Not Detected Not Detected copies/mL Final

## 2023-12-12 NOTE — PROGRESS NOTES
[facility]   Transplant Nephrology Progress Note  Date of Admission:  12/8/2023  Today's Date: 12/12/2023    Recommendations:  - No acute indications for dialysis.  - Recommend furosemide 40 mg IV x once and reassess.  - If needed, could restart low dose IV metoprolol for antihypertensive management until taking oral meds.  - Would consider 2-3 doses of iron ferrlecit 200 mg IV daily for anemia with mild iron deficiency.    Assessment & Plan   # DDKT (SLK): Trend down, mildly elevated serum creatinine.  Good urine output.  No acute indications for dialysis.   - PATI felt secondary to hypotension and sepsis with probable ATN.    - Baseline Creatinine: ~ 0.8-1.0   - Proteinuria: Normal (<0.2 grams)   - Date DSA Last Checked: Aug/2023      Latest DSA: No   - BK Viremia: No   - Kidney Tx Biopsy: Jun 20, 2023; Result: No diagnostic evidence of acute rejection.   Focal acute tubular necrosis.  Mild arterial and arteriolar sclerosis.    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  Transaminases Stable.  Followed by Transplant Surgery.     # Immunosuppression Prior to Admission: Tacrolimus immediate release (goal 6-8), Mycophenolic acid (dose 540 mg every 12 hours), and Prednisone (dose 5 mg daily)   - Present Immunosuppression: Tacrolimus immediate release (goal 6-8), Mycophenolate mofetil (dose 500 mg every 12 hours), and Methylprednisolone (dose 4 mg daily)   - Patient is in an immunosuppressed state and will continue to monitor for efficacy and toxicity of immunosuppression medications.   - Changes: Not at this time     # Infection Prophylaxis:   - PJP: Sulfa/TMP (Bactrim) - On hold with NPO  - CMV: None, prophylaxis completed; CMV IgG Ab positive  - Fungal: Fluconazole (Diflucan)    # Hypertension: Controlled;  Goal BP: < 140/90 (Hospitalization goal)   - Volume status: Mildly hypervolemic  EDW ~  225 lbs   - Changes: Yes - With edema and weight up, recommend dose of furosemide 40 mg IV x once and reassess.  - Antihypertensive medications on hold at this time, but if needed, could use IV metoprolol.    # Elevated Blood Glucose: Glucose generally running ~ 150-180s   - On insulin.   - Management as per primary team.    # Anemia in Chronic Renal Disease: Hgb: Stable      FEDERICO: No   - Iron studies: Low iron saturation    # Mineral Bone Disorder:   - Secondary renal hyperparathyroidism; PTH level: Normal (15-65 pg/ml)        On treatment: None  - Vitamin D; level: Normal        On supplement: No  - Calcium; level: Low normal        On supplement: No  - Phosphorus; level: Normal        On supplement: No    # Electrolytes:   - Potassium; level: Normal        On supplement: No  - Magnesium; level: Normal        On supplement: No  - Bicarbonate; level: Stable low        On supplement: No    # Possible Pneumonia: Patient with LLL atelectasis and small right pleural effusion on CXR.  He has a productive cough, but negative cultures.  On broad-spectrum antibiotics for abdominal infection.  Oxygenation is okay.   - With weight up, would recommend gentle diuresis.    # Umbilical Hernia Mesh Repair: Patient is s/p scheduled redo mesh repair of umbilical hernia and minimal DHEERAJ of small bowel 12/7/23. Patient then represented 12/8/23 from home with severe LLQ abdominal pain and N/V 12/8/23.  Back to OR 12/10 and found to have bowel leak and repaired.  On broad spectrum-antibiotics and antifungal.    # CAD, s/p PCI: Asymptomatic.     # Paroxysmal A-Fib with Episodic RVR: Patient had episode of unresponsiveness 12/8/23- possible seizure vs apnea. Extensive workup with evidence of A fib w RVR, s/p PO dose of metoprolol.  Received Digoxin 500 mcg given IV once and subsequent dose of 250mcg on 12/9.  On apixaban PTA.  A. fib felt to be driven by septic shock. Started on amiodarone gtt on 12/9.  Presently in A. fib, but rate  controlled.  Anticoagulation on hold due to recent surgery.    # Obesity, Class II (BMI = 39.7): Trend up in weight, likely some volume related following surgery.   - Once patient heals and recovers from surgery, would recommend working on weight loss for overall health by increasing exercise and watching caloric intake.     # Peripheral Neuropathy: Had been stable on gabapentin, but presently on hold due to recent hypotension.     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  Previously treated with rituximab x 2.     # Hx of Gout: PTA on prednisone 5mg daily and allopurinol 300mg daily.  Also takes anakinra 100mg with gout symptoms.  Allopurinol presently on hold due to NPO.    # Transplant History:  Etiology of Kidney Failure: IgA nephropathy and ANCA vasculitis  Tx: DDKT (SLK) and Liver Tx (K)  Transplant: 6/6/2023 (Liver), 6/6/2023 (Kidney)  Significant changes in immunosuppression: None  Significant transplant-related complications: CMV Viremia and DGF    Recommendations were communicated to the primary team via this note.    Jeovany Scott MD  Transplant Nephrology  Contact information available via [facility] Paging/Directory    Interval History   Mr. Quinones's creatinine is 1.42 (12/12 0355); Trend down.  Good urine output.  Stable, normal transaminases.  Other significant labs/tests/vitals: Stable electrolytes.  Stable hemoglobin.  No new events overnight.  No chest pain or shortness of breath.  Slight leg swelling.  No nausea and vomiting.  Bowel movements are watery, starting last night.  No fever, sweats or chills.    Review of Systems   4 point ROS was obtained and negative except as noted in the Interval History.    MEDICATIONS:   [Held by provider] allopurinol  300 mg Oral or Feeding Tube Daily    [Held by provider] atorvastatin  40 mg Oral or Feeding Tube QPM    fluconazole  400 mg Intravenous Q24H    [Held by provider] gabapentin  100 mg Oral or Feeding Tube BID    [Held by provider] gabapentin   200 mg Oral or Feeding Tube Daily    insulin aspart  1-12 Units Subcutaneous Q4H    levothyroxine  70 mcg Intravenous Daily    [Held by provider] levothyroxine  88 mcg Oral or Feeding Tube Daily    lipids plant base  250 mL Intravenous Once per day on     methylPREDNISolone  4 mg Intravenous Daily    metoprolol  5 mg Intravenous Q6H    [Held by provider] metoprolol tartrate  50 mg Oral BID    [Held by provider] mycophenolate  750 mg Oral or NG Tube BID IS    mycophenolate mofetil  500 mg Intravenous BID    pantoprazole  40 mg Intravenous BID    piperacillin-tazobactam  3.375 g Intravenous Q6H    [Held by provider] predniSONE  5 mg Oral or Feeding Tube Daily    [Held by provider] senna-docusate  2 tablet Oral or Feeding Tube BID    [Held by provider] sodium chloride (PF)  3 mL Intracatheter Q8H    [Held by provider] sulfamethoxazole-trimethoprim  1 tablet Oral or Feeding Tube Daily    tacrolimus  0.5 mg Sublingual BID IS      dextrose      heparin 1,850 Units/hr (23 1200)    parenteral nutrition - ADULT compounded formula      parenteral nutrition - ADULT compounded formula 40 mL/hr at 23 1200       Physical Exam   Temp  Av.1  F (36.7  C)  Min: 96.7  F (35.9  C)  Max: 100.1  F (37.8  C)  Arterial Line BP  Min: 70/55  Max: 158/122  Arterial Line MAP (mmHg)  Av.2 mmHg  Min: 61 mmHg  Max: 207 mmHg      Pulse  Av  Min: 60  Max: 147 Resp  Av  Min: 9  Max: 36  FiO2 (%)  Av.9 %  Min: 40 %  Max: 50 %  SpO2  Av.9 %  Min: 85 %  Max: 100 %     BP (!) 137/90   Pulse 67   Temp 98.5  F (36.9  C) (Oral)   Resp 18   Wt 115 kg (253 lb 8.5 oz)   SpO2 96%   BMI 39.71 kg/m     Date 23 0700 - 23 0659   Shift 1774-2474 1480-9511 5474-6543 24 Hour Total   INTAKE   I.V. 545.28   545.28      160   Shift Total(mL/kg) 705.28(6.33)   705.28(6.33)   OUTPUT   Urine 225   225   Shift Total(mL/kg) 225(2.02)   225(2.02)   Weight (kg)  111.5 111.5 111.5 111.5      Admit Weight: 111.5 kg (245 lb 13 oz)     GENERAL APPEARANCE: alert and no distress  HENT: mouth without ulcers or lesions  RESP: lungs clear to auscultation from anterior - no rales, rhonchi or wheezes  CV: irregular rhythm and rate, no rub, no murmur  EDEMA: 1+ LE edema bilaterally  ABDOMEN: soft, nondistended, mild TTP, bowel sounds normal  MS: extremities normal - no gross deformities noted, no evidence of inflammation in joints, no muscle tenderness  SKIN: no rash  TX KIDNEY: normal  DIALYSIS ACCESS:  None    Data   All labs reviewed by me.  CMP  Recent Labs   Lab 12/12/23  0843 12/12/23  0358 12/12/23  0355 12/12/23  0015 12/11/23  0512 12/11/23  0327 12/10/23  1415 12/10/23  1408 12/10/23  0631   NA  --   --  141  --   --  138  --  138 139   POTASSIUM  --   --  3.8  --   --  4.0  --  4.2 4.9   CHLORIDE  --   --  108*  --   --  106  --  105 105   CO2  --   --  21*  --   --  22  --  18* 15*   ANIONGAP  --   --  12  --   --  10  --  15 19*   * 161* 170* 160*   < > 214*   < > 131* 94   BUN  --   --  40.1*  --   --  35.7*  --  30.7* 31.8*   CR  --   --  1.42*  --   --  1.83*  --  1.71* 1.85*   GFRESTIMATED  --   --  54*  --   --  40*  --  44* 40*   NAZARIO  --   --  8.0*  --   --  7.4*  --  7.4* 8.5*   MAG  --   --  2.3  --   --  2.1  --  1.8 1.9   PHOS  --   --  2.8  --   --  2.4*  --  3.5 3.9   PROTTOTAL  --   --  5.1*  --   --  4.3*  --  4.3* 5.0*   ALBUMIN  --   --  2.8*  --   --  2.7*  --  2.9* 2.9*   BILITOTAL  --   --  0.6  --   --  1.1  --  1.5* 1.2   ALKPHOS  --   --  70  --   --  59  --  59 78   AST  --   --  24  --   --  24  --  23 49*   ALT  --   --  11  --   --  9  --  7 9    < > = values in this interval not displayed.     CBC  Recent Labs   Lab 12/12/23  0355 12/11/23  0327 12/10/23  1408 12/10/23  0233   HGB 10.2* 10.2* 12.1* 13.3   WBC 9.2 5.5 5.9 5.7   RBC 3.56* 3.52* 4.19* 4.63   HCT 33.2* 33.5* 39.0* 43.1   MCV 93 95 93 93   MCH 28.7 29.0 28.9 28.7   MCHC 30.7*  30.4* 31.0* 30.9*   RDW 17.3* 17.3* 17.5* 17.6*   * 124* 128* 174     INR  Recent Labs   Lab 12/11/23  0327 12/10/23  1408   INR 1.92* 2.29*   PTT 40* 40*     ABG  Recent Labs   Lab 12/11/23  0826 12/10/23  2024 12/10/23  1731 12/10/23  1408   PH 7.42 7.36 7.12* 7.35   PCO2 36 39 65* 38   PO2 63* 90 88 87   HCO3 23 22 21 21   O2PER 40 40 50 50      Urine Studies  Recent Labs   Lab Test 12/09/23  1405 11/07/23  0753 09/27/23  1157 09/25/23  0759 09/20/23  1645 08/29/23  1023 08/04/23  0715   COLOR Yellow Yellow Dark Yellow* Yellow Yellow   < > Yellow   APPEARANCE Clear Clear Clear Clear Clear   < > Clear   URINEGLC Negative Negative 50* Negative 100*   < > Negative   URINEBILI Negative Small* Small* Small* Negative   < > Negative   URINEKETONE Negative Negative Negative Negative Negative   < > Negative   SG 1.022 1.015 1.029 1.020 1.015   < > 1.010   UBLD Negative Negative Negative Negative Negative   < > Negative   URINEPH 5.0 7.0 6.0 7.0 7.0   < > 6.5   PROTEIN 10* Trace* 70* 30* Negative   < > Negative   UROBILINOGEN  --  0.2  --  0.2 0.2  --  0.2   NITRITE Negative Negative Negative Negative Negative   < > Negative   LEUKEST Negative Trace* Negative Negative Negative   < > Small*   RBCU 2 0-2 1 None Seen  --   --  0-2   WBCU 6* 10-25* 7* None Seen  --   --  0-5    < > = values in this interval not displayed.     No lab results found.  PTH  Recent Labs   Lab Test 09/20/23  1611 08/01/23  0924 05/19/23  0956   PTHI 24 45 67*     Iron Studies  Recent Labs   Lab Test 11/21/23  0742 11/07/23  0753 10/05/23  0752 09/05/23  0702 08/07/23  0726 08/01/23  0924 11/22/22  0848   IRON 60* 53* 83 32* 28* 23* 68    226* 241 258 244 231* 219*   IRONSAT 22 23 34 12* 11* 10* 31   HOLA 204 341 919* 485* 440* 506* 429*       IMAGING:  All imaging studies reviewed by me.

## 2023-12-12 NOTE — PROCEDURES
[facility]    Procedure: IR Procedure Note    Date/Time: 12/12/2023 2:38 PM    Performed by: Behzadi, Heshmatzadeh, MD  Authorized by: Truong Chavarria MD  IR Fellow Physician: Ashkan Behzadi      UNIVERSAL PROTOCOL   Site Marked: NA  Prior Images Obtained and Reviewed:  Yes  Required items: Required blood products, implants, devices and special equipment available    Patient identity confirmed:  Verbally with patient, arm band, provided demographic data and hospital-assigned identification number  Patient was reevaluated immediately before administering moderate or deep sedation or anesthesia  Confirmation Checklist:  Patient's identity using two indicators, relevant allergies, procedure was appropriate and matched the consent or emergent situation and correct equipment/implants were available  Time out: Immediately prior to the procedure a time out was called    Universal Protocol: the Joint Commission Universal Protocol was followed    Preparation: Patient was prepped and draped in usual sterile fashion       ANESTHESIA    Anesthesia: Local infiltration  Local Anesthetic:  Lidocaine 1% without epinephrine      SEDATION  Patient Sedated: Yes    Sedation:  Fentanyl and midazolam  Vital signs: Vital signs monitored during sedation    See dictated procedure note for full details.  Findings: -    Specimens: none    Complications: None    Condition: Stable    Plan: Line is ready to be accessed and used.       PROCEDURE  Describe Procedure: Successful placement of tunneled 6F double lumen catheter in the right EJ with distal tip at cavo-atrial junction.   Patient Tolerance:  Patient tolerated the procedure well with no immediate complications  Length of time physician/provider present for 1:1 monitoring during sedation: 30

## 2023-12-12 NOTE — PROGRESS NOTES
Patient Name: Damion Quinones  Medical Record Number: [id number]  Today's Date: 12/12/2023    Procedure: Tunneled PICC line placement.  Proceduralist: MD. Aura, Fellow.   Pathology present: n/a    Procedure Start: 1351  Procedure end: 1425  Sedation medications administered: Fentanyl: 50 mcg     Report given to: 4E, RN  : n/a    Other Notes: Pt arrived to IR room 5 from . Consent reviewed. Pt denies any questions or concerns regarding procedure. Pt positioned supine and monitored per protocol. Pt tolerated procedure without any noted complications. Pt transferred back to .    Flores Ye, RN

## 2023-12-12 NOTE — PROGRESS NOTES
Patient returned to SICU post-IR. Right EJ 6F catheter placed successfully.     Nursing order placed to remove current femoral CVC.         Plan:   - Transfer to floor   - SICU to sign off   - Transplant updated and aware of care plan/s     Leonel Streeter PA-C  12/12/23  2:54 PM

## 2023-12-12 NOTE — PROGRESS NOTES
"   12/12/23 0937   Appointment Info   Signing Clinician's Name / Credentials (OT) Lyla Kang, OTRL   Rehab Comments (OT) abdominal precautions   Living Environment   People in Home spouse   Current Living Arrangements house   Home Accessibility stairs within home   Number of Stairs, Within Home, Primary eight   Stair Railings, Within Home, Primary railing on right side (ascending)   Transportation Anticipated family or friend will provide   Living Environment Comments Pt lives in a multi-story home with his wife, Nabila. Pt reports 8 stairs to access either basement or upstairs. Bedroom and tub shower upstairs, walk-in shower in basement.   Self-Care   Usual Activity Tolerance moderate   Current Activity Tolerance fair   Regular Exercise Yes   Activity/Exercise Type other (see comments);biking  (PT/OT)   Exercise Amount/Frequency 3-5 times/wk   Equipment Currently Used at Home walker, rolling;shower chair;grab bar, tub/shower   Fall history within last six months no   Activity/Exercise/Self-Care Comment Pt reportsn SBA- IND with ADLs, wife A with heavy tasks/IADLs since returning home from [facility]. Pt reports he was using recumbent bike (4-6mi) as exercise. Pt had progressed past using walker, though has access to one as needed.   General Information   Onset of Illness/Injury or Date of Surgery 12/10/23   Referring Physician Leonel Streeter PA-C   Patient/Family Therapy Goal Statement (OT) to go home, \"get moving\"   Additional Occupational Profile Info/Pertinent History of Current Problem per chart Damion Quinones is a 68 year old male with a history of PAF, obesity, HTN, pre-diabetes, rheumatoid and psoriatic arthritis, CAD, and cirrhosis (alcohol + hemochromatosis) and 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 He underwent a redo mesh repair of umbilical hernia and minimal DHEERAJ of small bowel 12/7/23 with Dr. Clifton. Presented from home on POD1 after LLQ pain. Now s/p RTOR on 12/10 with " repaired enterotomy and abdominal wash out.   Existing Precautions/Restrictions fall;abdominal   Cognitive Status Examination   Orientation Status orientation to person, place and time   Cognitive Status Comments Appears intact   Visual Perception   Visual Impairment/Limitations corrective lenses for reading   Sensory   Sensory Quick Adds sensation intact   Posture   Posture forward head position;protracted shoulders   Range of Motion Comprehensive   General Range of Motion bilateral upper extremity ROM WFL   Strength Comprehensive (MMT)   Comment, General Manual Muscle Testing (MMT) Assessment BUE WFL   Coordination   Upper Extremity Coordination No deficits were identified   Bed Mobility   Bed Mobility supine-sit   Comment (Bed Mobility) Anticipate Ax2   Transfers   Transfers sit-stand transfer;toilet transfer;shower transfer   Sit-Stand Transfer   Sit-Stand [place] (Transfers) minimum assist (75% patient effort);2 person assist   Shower Transfer   Type (Shower Transfer) lateral   [place] Level (Shower Transfer) minimum assist (75% patient effort);moderate assist (50% patient effort);2 person assist   Shower Transfer Comments per clinical judgement   Toilet Transfer   Type (Toilet Transfer) sit-stand   [place] Level (Toilet Transfer) minimum assist (75% patient effort);2 person assist   Toilet Transfer Comments per clinical judgement   Balance   Balance Comments good seated balance, standing balance CGA x 2   Activities of Daily Living   BADL Assessment/Intervention bathing;lower body dressing;toileting   Bathing Assessment/Intervention   [place] Level (Bathing) maximum assist (25% patient effort)   Comment, (Bathing) per clinical judgement   Lower Body Dressing Assessment/Training   Comment, (Lower Body Dressing) per clinical judgement   [place] Level (Lower Body Dressing) maximum assist (25% patient effort)   Toileting   Comment, (Toileting) per clinical judgement   [place] Level  (Toileting) maximum assist (25% patient effort)   Clinical Impression   Criteria for Skilled Therapeutic Interventions Met (OT) Yes, treatment indicated   OT Diagnosis pt is below baseline for ADLs   OT Problem List-Impairments impacting ADL problems related to;activity tolerance impaired;mobility;pain;post-surgical precautions;balance   Assessment of Occupational Performance 3-5 Performance Deficits   Identified Performance Deficits dressing, bathing, toileting, functional mobility   Planned Therapy Interventions (OT) ADL retraining;IADL retraining;strengthening;transfer training;home program guidelines;progressive activity/exercise   Clinical Decision Making Complexity (OT) detailed assessment/moderate complexity   Risk & Benefits of therapy have been explained evaluation/treatment results reviewed;care plan/treatment goals reviewed;risks/benefits reviewed;current/potential barriers reviewed;participants voiced agreement with care plan;participants included;patient;spouse/significant other   Clinical Impression Comments Pt presents below baseline, limited by activity tolerance, pain, post-surgical precautions. Pt will benefit from skilled OT to progress toward PLOF   OT Total Evaluation Time   OT Eval, Low Complexity Minutes (22943) 8   OT Goals   Therapy Frequency (OT) 6 times/week   OT Predicted Duration/Target Date for Goal Attainment 12/22/23   OT Goals Lower Body Dressing;Lower Body Bathing;Toilet Transfer/Toileting;OT Goal 1   OT: Lower Body Dressing Modified independent;within precautions;using adaptive equipment   OT: Lower Body Bathing Modified independent;with precautions;using adaptive equipment   OT: Toilet Transfer/Toileting Independent;toilet transfer;cleaning and garment management;within precautions   OT: Goal 1 Pt will complete tub/shower transfer with SBA and use of grab bars PRN by discharge.   Interventions   Interventions Quick Adds Self-Care/Home Management;Therapeutic Activity    Self-Care/Home Management   Self-Care/Home Mgmt/ADL, Compensatory, Meal Prep Minutes (44408) 10   Symptoms Noted During/After Treatment (Meal Preparation/Planning Training) fatigue   Treatment Detail/Skilled Intervention Facilitated seated ADLs to progress pt activity tolerance and IND within precautions. Pt educated on abdominal precautions with pt expressing familiarity from previous surgeries. Pt washing face and brushing teeth with set up A.   Therapeutic Activities   Therapeutic Activity Minutes (93649) 40   Symptoms noted during/after treatment fatigue   Treatment Detail/Skilled Intervention Facilitated functional transfers to progress pt IND and activity tolerance for ADLs. Time spent setting up room/managing lines to increase pt safety. Pt sitting forward in chair with mod A x 2, cues for breathing throughout/avoiding holding breath. Pt STS x 2 from recliner with min A x 2, cues for body mechanics. Pt tolerating standing for 2-3 mins on each trial, progressing from static standing to weight shifting > stepping in place. Pt able to take 3-4 steps forward <> backward with CGA-min A x 2. Prolonged seated break between stands with SPO2 into mid - high 80s on 2-3L.   OT Discharge Planning   OT Plan short distances (bring walker), LB dressing, standing ADLs   OT Discharge Recommendation (DC Rec) Transitional Care Facility;home with assist;home with home care occupational therapy   OT Rationale for DC Rec If pt were d/cing today, likely to require TCU stay as pt mobilizing with Ax2, limited activity tolerance d/t pain. Pending LOS and progress, pt may be able to d/c home with 24/7 A from family and  therapies. Will update recs as pt progresses./   OT Brief overview of current status min A x 2 STS   Total Session Time   Timed Code Treatment Minutes 50   Total Session Time (sum of timed and untimed services) 58

## 2023-12-12 NOTE — PLAN OF CARE
Major Shift Events:  A&Ox4. Up to chair this morning. A fib, amio gtt discontinued. Scheduled IV metoprolol. 3LNC. NPO. TPN @ 40ml/hr with lipids. NG to LIS, ok for irrigation per SICU. Abdomen firm/taut. Stool x1. Lux with good output. MAE x3, large output @ 0400 from drain 2, SICU notified. Heparin gtt @ 1550, PIV infiltrated, will redraw Xa level @ 1200.    Plan: Continue to monitor. IR for PICC placement today.     For vital signs and complete assessments, please see documentation flowsheets.

## 2023-12-12 NOTE — PLAN OF CARE
Neuro: intact, BUNN, PERRLA, lift to chair; denies pain  Cardio: a fib, on amio gtt @ 0.5, starting scheduled metoprolol, normotensive; afebrile  Respiratory: 3L NC, using IS, productive cough  GI/: stauffer with good UO; hypoactive bowel sounds, no flatus yet; NG @ 60, LIS   Skin: incision covered with primapore with dried serosanguinous drainage; 3 JPs to bulb suction with minimal serous output      Handoff given to following RN. For VS and complete assessments, please see documentation flowsheets.

## 2023-12-12 NOTE — CONSULTS
"      [facility] Consult Service Note  Interventional Radiology  12/12/23   7:11 AM    Consult Requested: \"Unsuccessful PICC placement by RN, needs CVC for TPN\"    Recommendations/Plan:    Patient is approved for IR non-cuffed tunneled central venous catheter, double lumen on 12/12/23.    Timing of procedure is TBD based on IR staffing/schedule and triage.  Please contact the IR charge RN at [phone] for estimated time of procedure.   Labs WNL for procedure .  Orders entered for procedure.  Medications to be held include: None  Informed consent will be completed prior to procedure.     Case and imaging discussed with IR attending Dr. Chavarria.   Recommendations were reviewed with primary team at [id number].     History of Present Illness:  Damion Quinones is a 68 year old male with past medical history of IgA nephropathy, ANCA vasculitis, hx of DDKT 6/6/23, hx of cirrhosis (EtOH and hemochromatosis) s/p liver transplant 6/6/23, PAF (on apixaban PTA), anemia, pre-diabetes, CAD who underwent a redo mesh repair of umbilical hernia and minimal DHEERAJ of small bowel on 12/7/23. Patient returned on POD#1 with LLQ. Patient returned to the OR on 12/10/23 with repair of small bowel perforation and abdominal wash out. Patient is currently on zosyn and diflucan. Due to recent surgical intervention, plan is NPO with TPN for at least 7 days, then upper GI. Patient has had his apixaban held since 12/3/23 prior to procedure and he has not restarted post-procedure. He is on a low intensity heparin gtt (since 12/9/23). Patient has a chronic right internal jugular venous thrombus, it appears non-occlusive per recent upper extremity US from 12/11/23. Our vascular access team attempted a PICC at patient's right arm, however, they were unable to thread the catheter, therefore requested IR for PICC placement. Patient previously has had a PICC placed without difficulty by IR on 6/16/23. He also had a tunneled line placed on 6/14/23 " via the right EJ due to partial thrombus. Per report, access was initially attempted in right internal jugular, however, it was partially thrombosed and wire was unable to be advanced. Dr. Chavarria reviewed recent imaging from 12/11/23 and feels that right internal jugular is appropriate for access.     Pertinent Imaging Reviewed:   US Upper extremity 12/11/23  US upper extremity 9/28/23  Expected date of discharge:  12/10/2023    Vitals:   BP (!) 142/84   Pulse 66   Temp 97.8  F (36.6  C) (Oral)   Resp 19   Wt 115 kg (253 lb 8.5 oz)   SpO2 96%   BMI 39.71 kg/m      Pertinent Labs Reviewed:  CBC:  Lab Results   Component Value Date    WBC 9.2 12/12/2023    WBC 5.5 12/11/2023    WBC 5.9 12/10/2023     Lab Results   Component Value Date    HGB 10.2 12/12/2023    HGB 10.2 12/11/2023    HGB 12.1 12/10/2023     Lab Results   Component Value Date     12/12/2023     12/11/2023     12/10/2023    INR:  Lab Results   Component Value Date    INR 1.92 (H) 12/11/2023    PTT 40 (H) 12/11/2023          COVID Results:  COVID-19 Antibody Results, Testing for Immunity           No data to display              COVID-19 PCR Results          12/16/2022    20:25 1/15/2023    15:05 1/23/2023    06:09 2/13/2023    07:43 4/7/2023    16:30 6/5/2023    14:09   COVID-19 PCR Results   SARS CoV2 PCR Negative  Negative  Negative  Negative  Negative  Negative          Salena Mckinney PA-C  Interventional Radiology  Pager: [phone]

## 2023-12-12 NOTE — PROGRESS NOTES
SURGICAL ICU PROGRESS NOTE  December 12, 2023      CO-MORBIDITIES:   Acute post-operative pain  Liver replaced by transplant (H)  Status post kidney transplant    ASSESSMENT: Damion Quinones is a 68 year old male with a history of PAF, obesity, HTN, pre-diabetes, rheumatoid and psoriatic arthritis, CAD, and cirrhosis (alcohol + hemochromatosis) and 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 He underwent a redo mesh repair of umbilical hernia and minimal DHEERAJ of small bowel 12/7/23 with Dr. Clifton. He presented 12/9 from home with severe LLQ abdominal pain and N/V. CT without contrast without evidence of obstruction or perforation. Throughout the day 12/9 in Afib with RVR, progressively hypotensive despite aggressive fluid resuscitation, ultimately requiring pressor support and transfer to SICU. RTOR and now s/p repair of bowel perforation and primary fascial closure 12/10.     INTERVAL EVENTS  - IV metoprolol scheduled resumed, amio drip stopped at 0000   - Hemodynamically stable overnight     CHANGES AND UPDATES TODAY:   - Bowel movement x 2 this AM, make supp daily PRN   - Continue TPN, stop mIVF   - IR to place CVC for TPN, will remove femoral CVC once completed   - Transplant ok to transfer to floor   - Ongoing pain control   - Remove stauffer   - OOB, work with PT today   - Transplant ok for ice chips     PLAN:  Neurological:  # Acute post-op pain   # Peripheral neuropathy  - Pain control: PRN IV dilaudid, Capsaicin cream  - Monitor neurological status. Delirium preventions and precautions.      Pulmonary:   # Respiratory insufficiency  # Pulmonary edema, atelectasis  - Careful monitoring of fluid resuscitation with respiratory status  - S/p extubation 12/11, tolerating well      Cardiovascular:    # Hx CAD  # Atrial fibrillation with RVR   # Shock, septic vs mixed  - Most recent echo 12/9              Technically difficult study. Extremely difficult acoustic windows despite the  use of contrast  for endcardial border definition.  Global and regional left ventricular function is normal with an EF of 55-60%.  Right ventricular function cannot be assessed due to poor image quality.  No pericardial effusion is present  - Cardiology consulted 12/9, appreciate recs. No current indications for cardioversion  - Digoxin 500mcg given 12/9 AM and PM  - Amiodarone bolus and gtt  - Amio stopped at 0000   - Start scheduled IV metoprolol low dose for rate control   - MAP goal > 65, Maintaining independently     Gastroenterology/Nutrition:  # Hx cirrhosis s/p SLK 6/6/23  # Delayed return of bowel function s/p redo umbilical hernia mesh repair, DHEERAJ 12/7   # Perforated bowel s/p primary repair, 12/10  # Lactic acidosis, resolved   - Continue NPO, NGT to LIS   - TPN for nutrition, Plan for contrast study in coming days prior to initiation of PO  - Lactate normalized  - Transplant tentatively planning for bowel study Friday   - suppository daily      Renal/Fluids/Electrolytes:   # s/p SLK 6/6/23  # Acute kidney injury   - Cr 1.83, baseline ~1.0, downtrending   - Lux in place for strict I/O --> Remove   - PATI improving      Endocrine:  # Hypothyroidism   # Stress hyperglycemia    - PTA levothyroxine  - HDSSI     ID:  # Concern for septic shock, Resolved  # Saccharomyces cerevisiae abdominal infection   - Zosyn, Fluconazole started empirically 12/9   - Discontinued vanc 12/10 given negative MRSA   - Follow cultures  - Positive cultures:   12/10 - Yeast growing from abdomen   12/12: Saccharomyces from aerobic culture     # Immunosuppression / prophylaxis  - MMF/tacrolimus, Bactrim, Valcyte     Heme:     # Hx provoked DVT on apixapan  # Anemia of chronic illness  - Continue holding PTA apixaban held  - Low intensity heparin gtt per transplant  - Transfuse if hgb <7.0 or signs/symptoms of hypoperfusion. Monitor and trend.      Musculoskeletal/Rheum:  # Hx gout   # Weakness and deconditioning of critical illness   - PTA  allopurinol, prednisone held while NPO  - Physical and occupational therapy consult      General Cares/Prophylaxis:    DVT Prophylaxis: Heparin gtt  GI Prophylaxis: PPI  Restraints: Restraints for medical healing needed: NO     Lines/ tubes/ drains:  - NGT, PIV, Stauffer, femoral CVC     Plan discussed with SICU attending, Dr Khanna     I spent a total of 40 minutes providing critical care services at the bedside, and on the critical care unit, evaluating the patient, directing care and reviewing laboratory values and radiologic reports for the patient.       Disposition:  - Surgical ICU -- > floor     Leonel Streeter PA-C  12/12/23  10:53 AM      ====================================    SUBJECTIVE:   NAEON. HR stable on IV metop. Start ice chips today. OOB. Remove stauffer. To IR for CVC replacement. Floor.     OBJECTIVE:   1. VITAL SIGNS:   Temp:  [97.6  F (36.4  C)-98.5  F (36.9  C)] 98.5  F (36.9  C)  Pulse:  [59-92] 71  Resp:  [15-27] 15  BP: (115-153)/(73-95) 150/89  SpO2:  [92 %-97 %] 96 %  Vent Mode: CPAP/PS  (Continuous positive airway pressure with Pressure Support)  FiO2 (%): 40 %  Resp Rate (Set): 16 breaths/min  Tidal Volume (Set, mL): 500 mL  PEEP (cm H2O): 5 cmH2O  Pressure Support (cm H2O): 5 cmH2O  Resp: 15      2. INTAKE/ OUTPUT:   I/O last 3 completed shifts:  In: 3367.11 [I.V.:2153.56; NG/GT:40]  Out: 3156 [Urine:2100; Emesis/NG output:750; Drains:306]    3. PHYSICAL EXAMINATION:   General: Awake, no distress   Neuro: A&Ox3, NAD  Resp: Breathing non-labored, lungs slightly diminished at bases.   CV: Afib, Rate-controlled  Abdomen: Distended, compressible. Expected post-operative changes around prior incision site, 3 JPs with serosanguinous output  : Stauffer catheter in place with dark yellow urine --> To be removed   Extremities: Warm and well perfused, 2+ edema     4. INVESTIGATIONS:   Arterial Blood Gases   Recent Labs   Lab 12/11/23  0826 12/10/23  2024 12/10/23  1731 12/10/23  1408   PH 7.42  7.36 7.12* 7.35   PCO2 36 39 65* 38   PO2 63* 90 88 87   HCO3 23 22 21 21     Complete Blood Count   Recent Labs   Lab 12/12/23 0355 12/11/23  0327 12/10/23  1408 12/10/23  0233   WBC 9.2 5.5 5.9 5.7   HGB 10.2* 10.2* 12.1* 13.3   * 124* 128* 174     Basic Metabolic Panel  Recent Labs   Lab 12/12/23  0843 12/12/23 0358 12/12/23 0355 12/12/23  0015 12/11/23  0512 12/11/23  0327 12/10/23  1415 12/10/23  1408 12/10/23  0631   NA  --   --  141  --   --  138  --  138 139   POTASSIUM  --   --  3.8  --   --  4.0  --  4.2 4.9   CHLORIDE  --   --  108*  --   --  106  --  105 105   CO2  --   --  21*  --   --  22  --  18* 15*   BUN  --   --  40.1*  --   --  35.7*  --  30.7* 31.8*   CR  --   --  1.42*  --   --  1.83*  --  1.71* 1.85*   * 161* 170* 160*   < > 214*   < > 131* 94    < > = values in this interval not displayed.     Liver Function Tests  Recent Labs   Lab 12/12/23  0355 12/11/23  0327 12/10/23  1408 12/10/23  0631   AST 24 24 23 49*   ALT 11 9 7 9   ALKPHOS 70 59 59 78   BILITOTAL 0.6 1.1 1.5* 1.2   ALBUMIN 2.8* 2.7* 2.9* 2.9*   INR  --  1.92* 2.29*  --      Pancreatic Enzymes  No lab results found in last 7 days.  Coagulation Profile  Recent Labs   Lab 12/11/23  0327 12/10/23  1408   INR 1.92* 2.29*   PTT 40* 40*         5. RADIOLOGY:   Recent Results (from the past 24 hour(s))   US Upper Extremity Venous Duplex Right    Narrative    EXAMINATION: DOPPLER VENOUS ULTRASOUND OF THE RIGHT UPPER EXTREMITY,  12/11/2023 4:05 PM     COMPARISON: Ultrasound 3/21/2023.    HISTORY: Known prior right IJ chronic venous thrombosis    TECHNIQUE:  Gray-scale evaluation with compression, spectral flow and  color Doppler assessment of the deep venous system of the right upper  extremity.    FINDINGS:  Partial compressibility of the right internal jugular vein with  nonocclusive echogenic thrombus in the lumen. Normal blood flow and  waveforms are demonstrated in the innominate, subclavian, and axillary  veins. There  is normal compressibility of the brachial, basilic and  cephalic veins. Mild subcutaneous edema in the antecubital fossa.      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    I have personally reviewed the examination and initial interpretation  and I agree with the findings.    RIKI OSPINA,          SYSTEM ID:  [id number]

## 2023-12-13 ENCOUNTER — APPOINTMENT (OUTPATIENT)
Dept: PHYSICAL THERAPY | Facility: CLINIC | Age: 68
End: 2023-12-13
Attending: STUDENT IN AN ORGANIZED HEALTH CARE EDUCATION/TRAINING PROGRAM
Payer: COMMERCIAL

## 2023-12-13 ENCOUNTER — APPOINTMENT (OUTPATIENT)
Dept: OCCUPATIONAL THERAPY | Facility: CLINIC | Age: 68
End: 2023-12-13
Payer: COMMERCIAL

## 2023-12-13 LAB
ALBUMIN SERPL BCG-MCNC: 3 G/DL (ref 3.5–5.2)
ALP SERPL-CCNC: 90 U/L (ref 40–150)
ALT SERPL W P-5'-P-CCNC: 19 U/L (ref 0–70)
ANION GAP SERPL CALCULATED.3IONS-SCNC: 10 MMOL/L (ref 7–15)
AST SERPL W P-5'-P-CCNC: 26 U/L (ref 0–45)
BACTERIA WND CULT: ABNORMAL
BILIRUB DIRECT SERPL-MCNC: 0.39 MG/DL (ref 0–0.3)
BILIRUB SERPL-MCNC: 0.7 MG/DL
BUN SERPL-MCNC: 42.3 MG/DL (ref 8–23)
CALCIUM SERPL-MCNC: 8.1 MG/DL (ref 8.8–10.2)
CHLORIDE SERPL-SCNC: 106 MMOL/L (ref 98–107)
CREAT SERPL-MCNC: 1.19 MG/DL (ref 0.67–1.17)
DEPRECATED HCO3 PLAS-SCNC: 26 MMOL/L (ref 22–29)
EGFRCR SERPLBLD CKD-EPI 2021: 67 ML/MIN/1.73M2
ERYTHROCYTE [DISTWIDTH] IN BLOOD BY AUTOMATED COUNT: 17.4 % (ref 10–15)
GLUCOSE BLDC GLUCOMTR-MCNC: 121 MG/DL (ref 70–99)
GLUCOSE BLDC GLUCOMTR-MCNC: 184 MG/DL (ref 70–99)
GLUCOSE BLDC GLUCOMTR-MCNC: 186 MG/DL (ref 70–99)
GLUCOSE BLDC GLUCOMTR-MCNC: 186 MG/DL (ref 70–99)
GLUCOSE BLDC GLUCOMTR-MCNC: 203 MG/DL (ref 70–99)
GLUCOSE BLDC GLUCOMTR-MCNC: 210 MG/DL (ref 70–99)
GLUCOSE SERPL-MCNC: 196 MG/DL (ref 70–99)
HCT VFR BLD AUTO: 33.8 % (ref 40–53)
HGB BLD-MCNC: 10.4 G/DL (ref 13.3–17.7)
MAGNESIUM SERPL-MCNC: 2.1 MG/DL (ref 1.7–2.3)
MCH RBC QN AUTO: 28.7 PG (ref 26.5–33)
MCHC RBC AUTO-ENTMCNC: 30.8 G/DL (ref 31.5–36.5)
MCV RBC AUTO: 93 FL (ref 78–100)
PHOSPHATE SERPL-MCNC: 2.5 MG/DL (ref 2.5–4.5)
PLATELET # BLD AUTO: 110 10E3/UL (ref 150–450)
POTASSIUM SERPL-SCNC: 3.6 MMOL/L (ref 3.4–5.3)
PROT SERPL-MCNC: 5.3 G/DL (ref 6.4–8.3)
RBC # BLD AUTO: 3.63 10E6/UL (ref 4.4–5.9)
SODIUM SERPL-SCNC: 142 MMOL/L (ref 135–145)
TACROLIMUS BLD-MCNC: 9.2 UG/L (ref 5–15)
TME LAST DOSE: NORMAL H
TME LAST DOSE: NORMAL H
UFH PPP CHRO-ACNC: 0.3 IU/ML
UFH PPP CHRO-ACNC: 0.31 IU/ML
WBC # BLD AUTO: 9.1 10E3/UL (ref 4–11)

## 2023-12-13 PROCEDURE — 250N000011 HC RX IP 250 OP 636: Performed by: STUDENT IN AN ORGANIZED HEALTH CARE EDUCATION/TRAINING PROGRAM

## 2023-12-13 PROCEDURE — 120N000011 HC R&B TRANSPLANT UMMC

## 2023-12-13 PROCEDURE — 97535 SELF CARE MNGMENT TRAINING: CPT | Mod: GO

## 2023-12-13 PROCEDURE — 97161 PT EVAL LOW COMPLEX 20 MIN: CPT | Mod: GP

## 2023-12-13 PROCEDURE — 250N000009 HC RX 250: Mod: JZ | Performed by: SURGERY

## 2023-12-13 PROCEDURE — 97116 GAIT TRAINING THERAPY: CPT | Mod: GP

## 2023-12-13 PROCEDURE — 85520 HEPARIN ASSAY: CPT | Performed by: SURGERY

## 2023-12-13 PROCEDURE — 250N000011 HC RX IP 250 OP 636

## 2023-12-13 PROCEDURE — 258N000003 HC RX IP 258 OP 636: Performed by: STUDENT IN AN ORGANIZED HEALTH CARE EDUCATION/TRAINING PROGRAM

## 2023-12-13 PROCEDURE — 250N000011 HC RX IP 250 OP 636: Mod: JZ | Performed by: SURGERY

## 2023-12-13 PROCEDURE — 84100 ASSAY OF PHOSPHORUS: CPT | Performed by: NURSE PRACTITIONER

## 2023-12-13 PROCEDURE — 250N000012 HC RX MED GY IP 250 OP 636 PS 637: Performed by: NURSE PRACTITIONER

## 2023-12-13 PROCEDURE — 999N000127 HC STATISTIC PERIPHERAL IV START W US GUIDANCE

## 2023-12-13 PROCEDURE — C9113 INJ PANTOPRAZOLE SODIUM, VIA: HCPCS

## 2023-12-13 PROCEDURE — 83735 ASSAY OF MAGNESIUM: CPT | Performed by: NURSE PRACTITIONER

## 2023-12-13 PROCEDURE — 250N000011 HC RX IP 250 OP 636: Performed by: NURSE PRACTITIONER

## 2023-12-13 PROCEDURE — 258N000003 HC RX IP 258 OP 636

## 2023-12-13 PROCEDURE — 250N000009 HC RX 250

## 2023-12-13 PROCEDURE — 97530 THERAPEUTIC ACTIVITIES: CPT | Mod: GP

## 2023-12-13 PROCEDURE — 99233 SBSQ HOSP IP/OBS HIGH 50: CPT | Performed by: INTERNAL MEDICINE

## 2023-12-13 PROCEDURE — 85027 COMPLETE CBC AUTOMATED: CPT | Performed by: STUDENT IN AN ORGANIZED HEALTH CARE EDUCATION/TRAINING PROGRAM

## 2023-12-13 PROCEDURE — 258N000003 HC RX IP 258 OP 636: Performed by: SURGERY

## 2023-12-13 PROCEDURE — 80197 ASSAY OF TACROLIMUS: CPT | Performed by: NURSE PRACTITIONER

## 2023-12-13 PROCEDURE — B4185 PARENTERAL SOL 10 GM LIPIDS: HCPCS | Mod: JZ | Performed by: SURGERY

## 2023-12-13 PROCEDURE — 250N000009 HC RX 250: Performed by: STUDENT IN AN ORGANIZED HEALTH CARE EDUCATION/TRAINING PROGRAM

## 2023-12-13 PROCEDURE — 250N000009 HC RX 250: Performed by: NURSE PRACTITIONER

## 2023-12-13 PROCEDURE — 250N000011 HC RX IP 250 OP 636: Performed by: PHYSICIAN ASSISTANT

## 2023-12-13 PROCEDURE — 82040 ASSAY OF SERUM ALBUMIN: CPT | Performed by: STUDENT IN AN ORGANIZED HEALTH CARE EDUCATION/TRAINING PROGRAM

## 2023-12-13 PROCEDURE — 82248 BILIRUBIN DIRECT: CPT | Performed by: NURSE PRACTITIONER

## 2023-12-13 PROCEDURE — 97530 THERAPEUTIC ACTIVITIES: CPT | Mod: GO

## 2023-12-13 RX ORDER — POTASSIUM CHLORIDE 7.45 MG/ML
10 INJECTION INTRAVENOUS
Qty: 200 ML | Refills: 0 | Status: COMPLETED | OUTPATIENT
Start: 2023-12-13 | End: 2023-12-13

## 2023-12-13 RX ORDER — TACROLIMUS 0.5 MG/1
0.5 CAPSULE ORAL
Status: DISCONTINUED | OUTPATIENT
Start: 2023-12-13 | End: 2023-12-20 | Stop reason: HOSPADM

## 2023-12-13 RX ORDER — DEXTROSE MONOHYDRATE 50 MG/ML
INJECTION, SOLUTION INTRAVENOUS
Status: COMPLETED
Start: 2023-12-13 | End: 2023-12-13

## 2023-12-13 RX ORDER — FUROSEMIDE 10 MG/ML
40 INJECTION INTRAMUSCULAR; INTRAVENOUS ONCE
Status: COMPLETED | OUTPATIENT
Start: 2023-12-13 | End: 2023-12-13

## 2023-12-13 RX ADMIN — METHYLPREDNISOLONE SODIUM SUCCINATE 4 MG: 40 INJECTION INTRAMUSCULAR; INTRAVENOUS at 08:43

## 2023-12-13 RX ADMIN — HEPARIN SODIUM 2000 UNITS/HR: 10000 INJECTION, SOLUTION INTRAVENOUS at 01:52

## 2023-12-13 RX ADMIN — ONDANSETRON 4 MG: 2 INJECTION INTRAMUSCULAR; INTRAVENOUS at 22:15

## 2023-12-13 RX ADMIN — FLUCONAZOLE IN SODIUM CHLORIDE 400 MG: 2 INJECTION, SOLUTION INTRAVENOUS at 13:07

## 2023-12-13 RX ADMIN — PIPERACILLIN AND TAZOBACTAM 3.38 G: 3; .375 INJECTION, POWDER, LYOPHILIZED, FOR SOLUTION INTRAVENOUS at 09:41

## 2023-12-13 RX ADMIN — HYDROMORPHONE HYDROCHLORIDE 0.2 MG: 0.2 INJECTION, SOLUTION INTRAMUSCULAR; INTRAVENOUS; SUBCUTANEOUS at 04:41

## 2023-12-13 RX ADMIN — LEVOTHYROXINE SODIUM ANHYDROUS 70 MCG: 100 INJECTION, POWDER, LYOPHILIZED, FOR SOLUTION INTRAVENOUS at 10:46

## 2023-12-13 RX ADMIN — FUROSEMIDE 40 MG: 10 INJECTION, SOLUTION INTRAMUSCULAR; INTRAVENOUS at 12:52

## 2023-12-13 RX ADMIN — METOPROLOL TARTRATE 5 MG: 5 INJECTION INTRAVENOUS at 10:45

## 2023-12-13 RX ADMIN — PIPERACILLIN AND TAZOBACTAM 3.38 G: 3; .375 INJECTION, POWDER, LYOPHILIZED, FOR SOLUTION INTRAVENOUS at 01:52

## 2023-12-13 RX ADMIN — POTASSIUM PHOSPHATE, MONOBASIC POTASSIUM PHOSPHATE, DIBASIC: 224; 236 INJECTION, SOLUTION, CONCENTRATE INTRAVENOUS at 21:30

## 2023-12-13 RX ADMIN — MYCOPHENOLATE MOFETIL 500 MG: 500 INJECTION, POWDER, LYOPHILIZED, FOR SOLUTION INTRAVENOUS at 08:42

## 2023-12-13 RX ADMIN — PIPERACILLIN AND TAZOBACTAM 3.38 G: 3; .375 INJECTION, POWDER, LYOPHILIZED, FOR SOLUTION INTRAVENOUS at 21:16

## 2023-12-13 RX ADMIN — TACROLIMUS 0.5 MG: 0.5 CAPSULE ORAL at 18:25

## 2023-12-13 RX ADMIN — OLIVE OIL AND SOYBEAN OIL 250 ML: 16; 4 INJECTION, EMULSION INTRAVENOUS at 21:31

## 2023-12-13 RX ADMIN — ONDANSETRON 4 MG: 2 INJECTION INTRAMUSCULAR; INTRAVENOUS at 04:47

## 2023-12-13 RX ADMIN — METOPROLOL TARTRATE 5 MG: 5 INJECTION INTRAVENOUS at 16:57

## 2023-12-13 RX ADMIN — HYDROMORPHONE HYDROCHLORIDE 0.2 MG: 0.2 INJECTION, SOLUTION INTRAMUSCULAR; INTRAVENOUS; SUBCUTANEOUS at 11:36

## 2023-12-13 RX ADMIN — PANTOPRAZOLE SODIUM 40 MG: 40 INJECTION, POWDER, FOR SOLUTION INTRAVENOUS at 21:23

## 2023-12-13 RX ADMIN — METOPROLOL TARTRATE 5 MG: 5 INJECTION INTRAVENOUS at 04:30

## 2023-12-13 RX ADMIN — DEXTROSE MONOHYDRATE 1000 ML: 50 INJECTION, SOLUTION INTRAVENOUS at 22:15

## 2023-12-13 RX ADMIN — TACROLIMUS 0.5 MG: 0.5 CAPSULE, GELATIN COATED ORAL at 08:27

## 2023-12-13 RX ADMIN — POTASSIUM PHOSPHATE, MONOBASIC POTASSIUM PHOSPHATE, DIBASIC 9 MMOL: 224; 236 INJECTION, SOLUTION, CONCENTRATE INTRAVENOUS at 05:34

## 2023-12-13 RX ADMIN — MYCOPHENOLATE MOFETIL 500 MG: 500 INJECTION, POWDER, LYOPHILIZED, FOR SOLUTION INTRAVENOUS at 22:14

## 2023-12-13 RX ADMIN — POTASSIUM CHLORIDE 10 MEQ: 7.46 INJECTION, SOLUTION INTRAVENOUS at 06:30

## 2023-12-13 RX ADMIN — HEPARIN SODIUM 2000 UNITS/HR: 10000 INJECTION, SOLUTION INTRAVENOUS at 14:12

## 2023-12-13 RX ADMIN — POTASSIUM CHLORIDE 10 MEQ: 7.46 INJECTION, SOLUTION INTRAVENOUS at 05:34

## 2023-12-13 RX ADMIN — PIPERACILLIN AND TAZOBACTAM 3.38 G: 3; .375 INJECTION, POWDER, LYOPHILIZED, FOR SOLUTION INTRAVENOUS at 15:17

## 2023-12-13 RX ADMIN — METOPROLOL TARTRATE 5 MG: 5 INJECTION INTRAVENOUS at 21:20

## 2023-12-13 RX ADMIN — PANTOPRAZOLE SODIUM 40 MG: 40 INJECTION, POWDER, FOR SOLUTION INTRAVENOUS at 08:45

## 2023-12-13 ASSESSMENT — ACTIVITIES OF DAILY LIVING (ADL)
ADLS_ACUITY_SCORE: 32
ADLS_ACUITY_SCORE: 33
ADLS_ACUITY_SCORE: 32
ADLS_ACUITY_SCORE: 33
ADLS_ACUITY_SCORE: 34
ADLS_ACUITY_SCORE: 32
ADLS_ACUITY_SCORE: 32
ADLS_ACUITY_SCORE: 34
ADLS_ACUITY_SCORE: 32

## 2023-12-13 NOTE — PROGRESS NOTES
Transplant Surgery  Inpatient Daily Progress Note  12/13/2023    Assessment & Plan: Damion Quinones is a 68 year old male with a history of PAF, obesity, HTN, pre-diabetes, rheumatoid and psoriatic arthritis, CAD, and cirrhosis (alcohol + hemochromatosis) and 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 He underwent a redo mesh repair of umbilical hernia and minimal DHEERAJ of small bowel 12/7/23 with Dr. Clifton. Presented from home on POD1 after LLQ pain. Now s/p RTOR on 12/10 with repaired enterotomy and abdominal wash out.    s/p 12/7/23 Redo mesh repair of umbilical hernia and minimal DHEERAJ of small bowel; RTOR 12/10/23 Ex lap, repair of SB perforation, abdominal wash out:   - NPO with TPN    - upper GI 12/15   - Zosyn/diflucan   - Follow intra-op cultures, + yeast (saccharomyces cerevisiae)     Hx SLK 6/6/23:  Liver: LFTs WNL.   Kidney; PATI secondary to sepsis/SIRS: Cr 1.19 (from peak 1.9). Baseline ~0.8-1. Good UOP. Lux placed to monitor output.   - monitoring output  - appreciate transplant nephrology recs     Immunosuppressed status secondary to medications:   Maintenance:    - PTA on Myfortic 540 mg BID -> now to  mg IV   - Tacrolimus 1.5 mg BID -> SL Tac 0.5 mg BID. Goal level 6-8. Restarting oral tac   - PTA Prednisone 5 mg on hold due to NPO -> 4 mg Methylpred IV daily.      Neuro:  Peripheral neuropathy: Follows with Neurology. PTA gabapentin, capsaicin cream. Holding orals  Acute post-op pain: LLQ abdominal pain.   - PRN IV Dilaudid while NPO     Hematology:   Acute blood loss anemia: Hgb ~10 post-operatively (previously WNL). Monitor.  Chronic anticoagulation for Afib and DVT/PE ppx: PTA apixaban. Held starting 12/3 prior to procedure, not restarted post procedure.    - Heparin gtt low intensity started 12/9.   - start therapeutic intensity ggt 12/12  B/l internal jugular thrombus noted in OR: RUE US 12/11 with unchanged appearance of chronic nonocclusive thrombosis in right  internal jugular vein, heparin as above.     Cardiorespiratory:   Hx CAD: PTA atorvastatin on hold.  PAF with RVR and hypotension: PTA metoprolol 50 mg BID and PTA apixaban on HOLD for now. Cardiology consulted earlier this admission due to uncontrolled afib. Received digoxin x2 then amiodorone gtt. IV metoprolol initiated and amiodarone off.    - Continue IV metoprolol while NPO.   Hypotension; Tachycardia: LA normalized. Likely 2/2 abdominal sepsis. Resolved.  Respiratory insufficiency: Secondary to pulmonary edema. Extubated 12/11 without difficulty. Now breathing comfortably on RA - 2L NC.     GI/Nutrition: TPN. NPO, await UGI on 12/15. NGT to LIS.      Endocrine:   Hypothyroidism: PTA levothyroxine -> IV while NPO.  Steroid/stress induced hyperglycemia: Continue sliding scale insulin.     Fluid/Electrolytes: Electrolyte replacements per protocol   Hypomagnesemia: PTA Mag-Ox 800 mg BID on hold.     : See kidney graft function, no need for stauffer     Infectious disease:   Intradbominal sepsis: Improved since OR. Lactate improved. Cultures +yeast (Saccharomyces cerevisiae). Afebrile. No leukocytosis.    - Continue fluconazole, Zosyn.      Rheum:  Hx Gout: Follows with Rheumatology. PTA allopurinol, prednisone, PRN anakinra. Orals on hold.      Prophylaxis: DVT (hep ggt, restart apixiban when able), fall, GI (PPI), pneumocystis (Bactrim on hold)     Disposition: floor    GEORGE/Fellow/Resident Provider:   Sandro Ratliff MD PGY3  General Surgery Resident      Faculty: Dr. Dao BELLO PhD    __________________________________________________________________  Transplant History: Admitted 12/8/2023 for abdominal pain.   6/6/2023 (Liver), 6/6/2023 (Kidney), Postoperative day: 190 (Liver), 190 (Kidney)     Interval History: History is obtained from the patient  Overnight events: no acute events overnight. Having bowel function. No major complaints. Pain is improving.     ROS:   A 10-point review of systems was negative except as  noted above.    Curent Meds:   [Held by provider] allopurinol  300 mg Oral or Feeding Tube Daily    [Held by provider] atorvastatin  40 mg Oral or Feeding Tube QPM    fluconazole  400 mg Intravenous Q24H    [Held by provider] gabapentin  100 mg Oral or Feeding Tube BID    [Held by provider] gabapentin  200 mg Oral or Feeding Tube Daily    insulin aspart  1-12 Units Subcutaneous Q4H    levothyroxine  70 mcg Intravenous Daily    [Held by provider] levothyroxine  88 mcg Oral or Feeding Tube Daily    lipids plant base  250 mL Intravenous Once per day on Monday Tuesday Wednesday Thursday Friday Saturday    methylPREDNISolone  4 mg Intravenous Daily    metoprolol  5 mg Intravenous Q6H    [Held by provider] metoprolol tartrate  50 mg Oral BID    [Held by provider] mycophenolate  750 mg Oral or NG Tube BID IS    mycophenolate mofetil  500 mg Intravenous BID    pantoprazole  40 mg Intravenous BID    piperacillin-tazobactam  3.375 g Intravenous Q6H    [Held by provider] predniSONE  5 mg Oral or Feeding Tube Daily    [Held by provider] senna-docusate  2 tablet Oral or Feeding Tube BID    sodium chloride (PF)  10-40 mL Intracatheter Q8H    [Held by provider] sodium chloride (PF)  3 mL Intracatheter Q8H    [Held by provider] sulfamethoxazole-trimethoprim  1 tablet Oral or Feeding Tube Daily    tacrolimus  0.5 mg Oral BID IS       Physical Exam:     Current Vitals:   BP (!) 144/91 (BP Location: Left arm)   Pulse 63   Temp 97.7  F (36.5  C) (Axillary)   Resp 15   Wt 110.1 kg (242 lb 11.6 oz)   SpO2 96%   BMI 38.02 kg/m      Vital sign ranges:    Temp:  [97.6  F (36.4  C)-98.7  F (37.1  C)] 97.7  F (36.5  C)  Pulse:  [54-82] 63  Resp:  [8-29] 15  BP: (132-165)/() 144/91  SpO2:  [93 %-98 %] 96 %  Patient Vitals for the past 24 hrs:   BP Temp Temp src Pulse Resp SpO2 Weight   12/13/23 0900 (!) 144/91 -- -- 63 15 -- --   12/13/23 0800 -- 97.7  F (36.5  C) Axillary 69 17 96 % --   12/13/23 0600 -- -- -- 69 15 96 % --    12/13/23 0500 -- -- -- 57 12 96 % --   12/13/23 0400 (!) 154/93 98.7  F (37.1  C) Oral 68 12 96 % --   12/13/23 0300 -- -- -- 67 15 96 % --   12/13/23 0200 -- -- -- 66 12 97 % --   12/13/23 0100 -- -- -- 74 11 94 % --   12/13/23 0000 (!) 153/90 98.2  F (36.8  C) Oral 68 18 95 % 110.1 kg (242 lb 11.6 oz)   12/12/23 2300 -- -- -- 63 17 96 % --   12/12/23 2200 -- -- -- 62 16 93 % --   12/12/23 2100 -- -- -- 62 14 93 % --   12/12/23 2000 (!) 154/80 98.7  F (37.1  C) Oral 68 20 94 % --   12/12/23 1900 132/80 -- -- 60 17 94 % --   12/12/23 1800 (!) 145/89 -- -- 54 26 98 % --   12/12/23 1700 (!) 142/102 -- -- 67 10 98 % --   12/12/23 1600 137/80 97.6  F (36.4  C) Axillary 61 29 98 % --   12/12/23 1500 (!) 151/90 -- -- 61 21 98 % --   12/12/23 1425 (!) 153/99 -- -- 77 19 96 % --   12/12/23 1420 (!) 159/91 -- -- 67 18 96 % --   12/12/23 1415 (!) 152/84 -- -- 67 13 96 % --   12/12/23 1410 (!) 137/96 -- -- 65 13 96 % --   12/12/23 1405 (!) 147/93 -- -- 62 16 96 % --   12/12/23 1400 (!) 148/85 -- -- 70 16 96 % --   12/12/23 1355 (!) 142/89 -- -- 66 17 95 % --   12/12/23 1350 (!) 144/88 -- -- 74 16 96 % --   12/12/23 1345 (!) 144/93 -- -- 72 (!) 8 96 % --   12/12/23 1340 (!) 152/91 -- -- 67 23 95 % --   12/12/23 1335 (!) 141/100 -- -- 62 18 95 % --   12/12/23 1330 (!) 148/82 -- -- 73 18 98 % --   12/12/23 1200 (!) 165/104 -- Oral 82 20 97 % --       BP (!) 144/91 (BP Location: Left arm)   Pulse 63   Temp 97.7  F (36.5  C) (Axillary)   Resp 15   Wt 110.1 kg (242 lb 11.6 oz)   SpO2 96%   BMI 38.02 kg/m    General Appearance: in no apparent distress  Respiratory: unlabored on 2L NC  Cardiovascular: afib  GI: abdomen soft, appropriately tender over incision, mildly distended. No rebound or guarding. Incision intact Drains x3 with s/s output  Skin: warm, dry  Musculoskeletal: BLE pedal edema  Neurologic: A&OX4. No confusion  Psychiatric: calm, behavior appropriate to situation    Frailty Scores          12/27/2022 11/22/2022    Frailty Scores   Final Score Frail Frail   Final Score Number 3 4       Data:   CMP  Recent Labs   Lab 12/13/23  0856 12/13/23 0359 12/12/23  0358 12/12/23  0355 12/10/23  1415 12/10/23  1408 12/09/23  0159 12/08/23 2002   NA  --  142  --  141   < > 138   < > 139   POTASSIUM  --  3.6  --  3.8   < > 4.2   < > 4.2   CHLORIDE  --  106  --  108*   < > 105   < >  --    CO2  --  26  --  21*   < > 18*   < >  --    * 196*   < > 170*   < > 131*   < > 165*   BUN  --  42.3*  --  40.1*   < > 30.7*   < >  --    CR  --  1.19*  --  1.42*   < > 1.71*   < >  --    GFRESTIMATED  --  67  --  54*   < > 44*   < >  --    NAZARIO  --  8.1*  --  8.0*   < > 7.4*   < >  --    ICAW  --   --   --   --   --  4.1*  --  4.9   MAG  --  2.1  --  2.3   < > 1.8   < >  --    PHOS  --  2.5  --  2.8   < > 3.5   < >  --    ALBUMIN  --  3.0*  --  2.8*   < > 2.9*   < >  --    BILITOTAL  --  0.7  --  0.6   < > 1.5*   < >  --    ALKPHOS  --  90  --  70   < > 59   < >  --    AST  --  26  --  24   < > 23   < >  --    ALT  --  19  --  11   < > 7   < >  --     < > = values in this interval not displayed.     CBC  Recent Labs   Lab 12/13/23 0359 12/12/23 0355   HGB 10.4* 10.2*   WBC 9.1 9.2   * 123*     COAGS  Recent Labs   Lab 12/11/23  0327 12/10/23  1408   INR 1.92* 2.29*   PTT 40* 40*      Urinalysis  Recent Labs   Lab Test 12/09/23  1405 11/07/23  0753   COLOR Yellow Yellow   APPEARANCE Clear Clear   URINEGLC Negative Negative   URINEBILI Negative Small*   URINEKETONE Negative Negative   SG 1.022 1.015   UBLD Negative Negative   URINEPH 5.0 7.0   PROTEIN 10* Trace*   NITRITE Negative Negative   LEUKEST Negative Trace*   RBCU 2 0-2   WBCU 6* 10-25*     Virology:  Hepatitis C Antibody   Date Value Ref Range Status   06/05/2023 Nonreactive Nonreactive Final     BK Virus DNA copies/mL   Date Value Ref Range Status   10/05/2023 Not Detected Not Detected copies/mL Final   09/05/2023 Not Detected Not Detected copies/mL Final   08/29/2023 Not  Detected Not Detected copies/mL Final   08/21/2023 Not Detected Not Detected copies/mL Final   08/17/2023 Not Detected Not Detected copies/mL Final   08/01/2023 Not Detected Not Detected copies/mL Final   07/31/2023 Not Detected Not Detected copies/mL Final

## 2023-12-13 NOTE — PROGRESS NOTES
Admitted/transferred from: 4E  Time of arrival on unit 1200  2 RN full  skin assessment completed by SANDRA Shannon and SANDRA Ware  Skin assessment finding: issues found Scattered bruising, skin tear x2 RUE, healed clamshell incision, midline incision, MAE x3, redness L elbow    Interventions/actions: skin interventions incision care, Mepilex on skin tears, preventative Mepilex on elbows.      Will continue to monitor.

## 2023-12-13 NOTE — PLAN OF CARE
Neuros: Neurologically intact.  CV: Afebrile. A fib, rate controlled. HR 50s-70s. Heparin gtt infusing.  RR: 2 L NC. Clear LS.  GI/: BM x3. Lux pulled. Adequate UOP. Strict NPO. NGT to LIS. TPN and lipids infusing. Abd JPs x3. MAE #2 painful with stripping, IV dilaudid x1 given with relief. Distended, firm abd. Zofran x1 able to relieve pt's nausea.    Plan: Transfer to floor when bed ready. Continue with cares as ordered.    For vital signs and complete assessments, please see documentation flowsheets.

## 2023-12-13 NOTE — PROGRESS NOTES
12/13/23 1000   Appointment Info   Signing Clinician's Name / Credentials (PT) Mireille Hill, SPT   Student Supervision Direct Patient Contact Provided   Living Environment   People in Home spouse   Current Living Arrangements house   Home Accessibility stairs within home   Number of Stairs, Within Home, Primary eight   Stair Railings, Within Home, Primary railing on right side (ascending)   Transportation Anticipated family or friend will provide   Living Environment Comments Pt reports living in a house with his wife, 8 stairs to upstairs and 8 stairs to downstairs, R railing. Pt reports he has not driven much since his transplant, wife usually provides transportation.   Self-Care   Usual Activity Tolerance good   Current Activity Tolerance moderate   Regular Exercise Yes   Activity/Exercise Type other (see comments)  (recumbent bike)   Exercise Amount/Frequency 3-5 times/wk   Equipment Currently Used at Home grab bar, toilet;grab bar, tub/shower;shower chair;walker, rolling;wheelchair, manual   Fall history within last six months no   Activity/Exercise/Self-Care Comment Pt reports SBA-IND with ADLs, wife providing SBA for showering and A with IADLs. Report having walker and wheelchair at home that he no longer uses.   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 He underwent a redo mesh repair of umbilical hernia and minimal DHEERAJ of small bowel 12/7/23 with Dr. Clifton. Presented from home on POD1 after LLQ pain.  Now s/p RTOR on 12/10 with repaired enterotomy and abdominal wash out.   Existing Precautions/Restrictions abdominal;fall   Heart Disease Risk Factors High blood pressure;Overweight;Medical history;Gender;Age   Cognition   Affect/Mental Status (Cognition) WFL   Orientation Status (Cognition) oriented x 4   Follows Commands (Cognition) follows two-step commands;over 90% accuracy   Pain Assessment   Patient Currently in Pain Yes, see Vital Sign flowsheet   Posture    Posture Forward head position;Protracted shoulders   Range of Motion (ROM)   Range of Motion ROM is WFL   ROM Comment ROM grossly WFL BUE and BLE   Strength (Manual Muscle Testing)   Strength (Manual Muscle Testing) Deficits observed during functional mobility   Strength Comments gross deconditioning, post-surgical weakness   Bed Mobility   Comment, (Bed Mobility) impaired per clinical reasoning   Transfers   Comment, (Transfers) sit > stand Jimmy x2   Gait/Stairs (Locomotion)   Comment, (Gait/Stairs) 10' FWW CGA   Balance   Balance Comments static sitting balance good, static standing balance fair, dynamic standing balance impaired   Clinical Impression   Criteria for Skilled Therapeutic Intervention Yes, treatment indicated   PT Diagnosis (PT) impaired functional mobility   Influenced by the following impairments strength, balance, pain, activity tolerance, abd precautions   Functional limitations due to impairments bed mobility, transfers, gait, stairs, functional endurance   Clinical Presentation (PT Evaluation Complexity) stable   Clinical Presentation Rationale clinical reasoning   Clinical Decision Making (Complexity) low complexity   Planned Therapy Interventions (PT) balance training;bed mobility training;gait training;home exercise program;motor coordination training;neuromuscular re-education;patient/family education;postural re-education;stair training;strengthening;stretching;transfer training;progressive activity/exercise;risk factor  education;home program guidelines   Risk & Benefits of therapy have been explained evaluation/treatment results reviewed;care plan/treatment goals reviewed;risks/benefits reviewed;participants voiced agreement with care plan;participants included;patient;spouse/significant other   PT Total Evaluation Time   PT Eval, Low Complexity Minutes (13842) 8   Physical Therapy Goals   PT Frequency 6x/week   PT Predicted Duration/Target Date for Goal Attainment 12/27/23   PT Goals Bed Mobility;Transfers;Gait;Stairs   PT: Bed Mobility Independent;Supine to/from sit;Within precautions   PT: Transfers Independent;Sit to/from stand;Within precautions   PT: Gait Independent;Greater than 200 feet;Within precautions   PT: Stairs Independent;8 stairs;Rail on right;Within precautions   PT Discharge Planning   PT Plan progresss gait/decrease AD use if possible, stairs when appropriate, dynamic balance   PT Discharge Recommendation (DC Rec) Transitional Care Facility   PT Rationale for DC Rec Pt currently needing Ax2 to stand from standard height chair, likely needing further rehab before safely returning home. Anticipate pt will progress to safe discharge home throughout IP stay, will continue to evaluate.   PT Brief overview of current status Ax2 STS, CGA ambulation with FWW   Total Session Time   Timed Code Treatment Minutes 40   Total Session Time (sum of timed and untimed services) 48

## 2023-12-13 NOTE — PLAN OF CARE
Transferred to: 7A RM 2 at 1150  Belongings: sent with patient/wife  Lux removed? NA  Central line removed? No, To remain in place  Chart and medications sent with patient Yes  Family notified: Yes - wife at bedside      Alert and oriented x4. X1 dose of IV dilaudid given for abdominal pain. Afib 60s-80s, schedule IV metoprolol given. BP stable, no interventions. RA, lungs dim. NG to LIS. NPO. X3 soft small BM. Voiding spontaneously in urinal. Up to the chair x2 assist. Worked with PT and OT, walked around the unit. Wife at bedside, supportive.

## 2023-12-13 NOTE — PROGRESS NOTES
[facility]   Transplant Nephrology Progress Note  Date of Admission:  12/8/2023  Today's Date: 12/13/2023    Recommendations:  - No acute indications for dialysis.  - Recommend  continued furosemide 40 mg IV daily for now.  - Would consider 2-3 doses of iron ferrlecit 200 mg IV daily for anemia with mild iron deficiency.    Assessment & Plan   # DDKT (SLK): Trend down, minimally elevated serum creatinine.  Very good urine output.  No acute indications for dialysis.   - PATI felt secondary to hypotension and sepsis with probable ATN.    - Baseline Creatinine: ~ 0.8-1.0   - Proteinuria: Normal (<0.2 grams)   - Date DSA Last Checked: Aug/2023      Latest DSA: No   - BK Viremia: No   - Kidney Tx Biopsy: Jun 20, 2023; Result: No diagnostic evidence of acute rejection.   Focal acute tubular necrosis.  Mild arterial and arteriolar sclerosis.    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  Transaminases Stable.  Followed by Transplant Surgery.     # Immunosuppression Prior to Admission: Tacrolimus immediate release (goal 6-8), Mycophenolic acid (dose 540 mg every 12 hours), and Prednisone (dose 5 mg daily)   - Present Immunosuppression: Tacrolimus immediate release (goal 6-8), Mycophenolate mofetil (dose 500 mg every 12 hours), and Methylprednisolone (dose 4 mg daily)   - Patient is in an immunosuppressed state and will continue to monitor for efficacy and toxicity of immunosuppression medications.   - Changes: Not at this time     # Infection Prophylaxis:   - PJP: Sulfa/TMP (Bactrim) - On hold with NPO  - CMV: None, prophylaxis completed; CMV IgG Ab positive  - Fungal: Fluconazole (Diflucan)    # Hypertension: Controlled;  Goal BP: < 140/90 (Hospitalization goal)   - Volume status: Mildly hypervolemic  EDW ~ 225 lbs   - Changes: Yes - Would continue with furosemide 40 mg IV daily for now.  -  Antihypertensive medications on hold at this time, but if needed, could use IV metoprolol.    # Elevated Blood Glucose: Glucose generally running ~ 180-200s   - On insulin.   - Management as per primary team.    # Anemia in Chronic Renal Disease: Hgb: Stable      FEDERICO: No   - Iron studies: Low iron saturation    # Mineral Bone Disorder:   - Secondary renal hyperparathyroidism; PTH level: Normal (15-65 pg/ml)        On treatment: None  - Vitamin D; level: Normal        On supplement: No  - Calcium; level: Low normal        On supplement: No  - Phosphorus; level: Normal        On supplement: No    # Electrolytes:   - Potassium; level: Normal        On supplement: No  - Magnesium; level: Normal        On supplement: No  - Bicarbonate; level: Normal        On supplement: No    # Possible Pneumonia: Patient with LLL atelectasis and small right pleural effusion on CXR.  He has a productive cough, but negative cultures.  On broad-spectrum antibiotics for abdominal infection.  Oxygenation is okay.   - With weight up, would continue gentle diuresis.    # Umbilical Hernia Mesh Repair: Patient is s/p scheduled redo mesh repair of umbilical hernia and minimal DHEERAJ of small bowel 12/7/23. Patient then represented 12/8/23 from home with severe LLQ abdominal pain and N/V 12/8/23.  Back to OR 12/10 and found to have bowel leak and repaired.  On broad spectrum-antibiotics and antifungal.    # CAD, s/p PCI: Asymptomatic.     # Paroxysmal A-Fib with Episodic RVR: Patient had episode of unresponsiveness 12/8/23- possible seizure vs apnea. Extensive workup with evidence of A fib w RVR, s/p PO dose of metoprolol.  Received Digoxin 500 mcg given IV once and subsequent dose of 250mcg on 12/9.  On apixaban PTA.  A. fib felt to be driven by septic shock. Started on amiodarone gtt on 12/9.  Presently in A. fib, but rate controlled.  Anticoagulation on hold due to recent surgery.    # Obesity, Class II (BMI = 39.7): Trend up in weight, likely  some volume related following surgery.   - Once patient heals and recovers from surgery, would recommend working on weight loss for overall health by increasing exercise and watching caloric intake.     # Peripheral Neuropathy: Had been stable on gabapentin, but presently on hold due to recent hypotension.     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  Previously treated with rituximab x 2.     # Hx of Gout: PTA on prednisone 5mg daily and allopurinol 300mg daily.  Also takes anakinra 100mg with gout symptoms.  Allopurinol presently on hold due to NPO.    # Transplant History:  Etiology of Kidney Failure: IgA nephropathy and ANCA vasculitis  Tx: DDKT ([facility]) and Liver Tx ([facility])  Transplant: 6/6/2023 (Liver), 6/6/2023 (Kidney)  Significant changes in immunosuppression: None  Significant transplant-related complications: CMV Viremia and DGF    Recommendations were communicated to the primary team via this note.    Jeovany Scott MD  Transplant Nephrology  Contact information available via [facility] Paging/Directory    Interval History   Mr. Josephs creatinine is 1.19 (12/13 0359); Trend down.  Very good urine output.  Stable transaminases.  Other significant labs/tests/vitals: Stable electrolytes.  Stable hemoglobin.  No new events overnight.  No chest pain or shortness of breath.  Ambulating around on floor this morning.  Slight decreased leg swelling.  No nausea and vomiting.  Bowel movements are soft.  No fever, sweats or chills.    Review of Systems   4 point ROS was obtained and negative except as noted in the Interval History.    MEDICATIONS:   [Held by provider] allopurinol  300 mg Oral or Feeding Tube Daily    [Held by provider] atorvastatin  40 mg Oral or Feeding Tube QPM    fluconazole  400 mg Intravenous Q24H    [Held by provider] gabapentin  100 mg Oral or Feeding Tube BID    [Held by provider] gabapentin  200 mg Oral or Feeding Tube Daily    insulin aspart  1-12 Units Subcutaneous Q4H    levothyroxine   70 mcg Intravenous Daily    [Held by provider] levothyroxine  88 mcg Oral or Feeding Tube Daily    lipids plant base  250 mL Intravenous Once per day on     methylPREDNISolone  4 mg Intravenous Daily    metoprolol  5 mg Intravenous Q6H    [Held by provider] metoprolol tartrate  50 mg Oral BID    [Held by provider] mycophenolate  750 mg Oral or NG Tube BID IS    mycophenolate mofetil  500 mg Intravenous BID    pantoprazole  40 mg Intravenous BID    piperacillin-tazobactam  3.375 g Intravenous Q6H    [Held by provider] predniSONE  5 mg Oral or Feeding Tube Daily    [Held by provider] senna-docusate  2 tablet Oral or Feeding Tube BID    sodium chloride (PF)  10-40 mL Intracatheter Q8H    [Held by provider] sodium chloride (PF)  3 mL Intracatheter Q8H    [Held by provider] sulfamethoxazole-trimethoprim  1 tablet Oral or Feeding Tube Daily    tacrolimus  0.5 mg Oral BID IS      dextrose      heparin 2,000 Units/hr (23 1000)    parenteral nutrition - ADULT compounded formula      parenteral nutrition - ADULT compounded formula 40 mL/hr at 23 1000       Physical Exam   Temp  Av.1  F (36.7  C)  Min: 96.7  F (35.9  C)  Max: 100.1  F (37.8  C)  Arterial Line BP  Min: 70/55  Max: 158/122  Arterial Line MAP (mmHg)  Av.2 mmHg  Min: 61 mmHg  Max: 207 mmHg      Pulse  Av  Min: 60  Max: 147 Resp  Av  Min: 9  Max: 36  FiO2 (%)  Av.9 %  Min: 40 %  Max: 50 %  SpO2  Av.9 %  Min: 85 %  Max: 100 %     BP (!) 144/91 (BP Location: Left arm)   Pulse 63   Temp 97.7  F (36.5  C) (Axillary)   Resp 15   Wt 110.1 kg (242 lb 11.6 oz)   SpO2 96%   BMI 38.02 kg/m     Date 23 0700 - 23 0659   Shift 1405-8627 3491-0931 7421-4374 24 Hour Total   INTAKE   I.V. 545.28   545.28      160   Shift Total(mL/kg) 705.28(6.33)   705.28(6.33)   OUTPUT   Urine 225   225   Shift Total(mL/kg) 225(2.02)   225(2.02)   Weight (kg) 111.5 111.5 111.5  111.5      Admit Weight: 111.5 kg (245 lb 13 oz)     GENERAL APPEARANCE: alert and no distress, sitting up in a chair  HENT: mouth without ulcers or lesions  RESP: lungs clear to auscultation from anterior - no rales, rhonchi or wheezes  CV: irregular rhythm and rate, no rub, no murmur  EDEMA: trace to 1+ LE edema bilaterally  ABDOMEN: soft, nondistended, mild TTP, bowel sounds normal  MS: extremities normal - no gross deformities noted, no evidence of inflammation in joints, no muscle tenderness  SKIN: no rash  TX KIDNEY: normal  DIALYSIS ACCESS:  None    Data   All labs reviewed by me.  CMP  Recent Labs   Lab 12/13/23  0856 12/13/23  0359 12/13/23  0358 12/12/23  2356 12/12/23  0358 12/12/23  0355 12/11/23  0512 12/11/23  0327 12/10/23  1415 12/10/23  1408   NA  --  142  --   --   --  141  --  138  --  138   POTASSIUM  --  3.6  --   --   --  3.8  --  4.0  --  4.2   CHLORIDE  --  106  --   --   --  108*  --  106  --  105   CO2  --  26  --   --   --  21*  --  22  --  18*   ANIONGAP  --  10  --   --   --  12  --  10  --  15   * 196* 186* 186*   < > 170*   < > 214*   < > 131*   BUN  --  42.3*  --   --   --  40.1*  --  35.7*  --  30.7*   CR  --  1.19*  --   --   --  1.42*  --  1.83*  --  1.71*   GFRESTIMATED  --  67  --   --   --  54*  --  40*  --  44*   NAZARIO  --  8.1*  --   --   --  8.0*  --  7.4*  --  7.4*   MAG  --  2.1  --   --   --  2.3  --  2.1  --  1.8   PHOS  --  2.5  --   --   --  2.8  --  2.4*  --  3.5   PROTTOTAL  --  5.3*  --   --   --  5.1*  --  4.3*  --  4.3*   ALBUMIN  --  3.0*  --   --   --  2.8*  --  2.7*  --  2.9*   BILITOTAL  --  0.7  --   --   --  0.6  --  1.1  --  1.5*   ALKPHOS  --  90  --   --   --  70  --  59  --  59   AST  --  26  --   --   --  24  --  24  --  23   ALT  --  19  --   --   --  11  --  9  --  7    < > = values in this interval not displayed.     CBC  Recent Labs   Lab 12/13/23  0359 12/12/23  0355 12/11/23  0327 12/10/23  1408   HGB 10.4* 10.2* 10.2* 12.1*   WBC 9.1 9.2 5.5  5.9   RBC 3.63* 3.56* 3.52* 4.19*   HCT 33.8* 33.2* 33.5* 39.0*   MCV 93 93 95 93   MCH 28.7 28.7 29.0 28.9   MCHC 30.8* 30.7* 30.4* 31.0*   RDW 17.4* 17.3* 17.3* 17.5*   * 123* 124* 128*     INR  Recent Labs   Lab 12/11/23  0327 12/10/23  1408   INR 1.92* 2.29*   PTT 40* 40*     ABG  Recent Labs   Lab 12/11/23  0826 12/10/23  2024 12/10/23  1731 12/10/23  1408   PH 7.42 7.36 7.12* 7.35   PCO2 36 39 65* 38   PO2 63* 90 88 87   HCO3 23 22 21 21   O2PER 40 40 50 50      Urine Studies  Recent Labs   Lab Test 12/09/23  1405 11/07/23  0753 09/27/23  1157 09/25/23  0759 09/20/23  1645 08/29/23  1023 08/04/23  0715   COLOR Yellow Yellow Dark Yellow* Yellow Yellow   < > Yellow   APPEARANCE Clear Clear Clear Clear Clear   < > Clear   URINEGLC Negative Negative 50* Negative 100*   < > Negative   URINEBILI Negative Small* Small* Small* Negative   < > Negative   URINEKETONE Negative Negative Negative Negative Negative   < > Negative   SG 1.022 1.015 1.029 1.020 1.015   < > 1.010   UBLD Negative Negative Negative Negative Negative   < > Negative   URINEPH 5.0 7.0 6.0 7.0 7.0   < > 6.5   PROTEIN 10* Trace* 70* 30* Negative   < > Negative   UROBILINOGEN  --  0.2  --  0.2 0.2  --  0.2   NITRITE Negative Negative Negative Negative Negative   < > Negative   LEUKEST Negative Trace* Negative Negative Negative   < > Small*   RBCU 2 0-2 1 None Seen  --   --  0-2   WBCU 6* 10-25* 7* None Seen  --   --  0-5    < > = values in this interval not displayed.     No lab results found.  PTH  Recent Labs   Lab Test 09/20/23  1611 08/01/23  0924 05/19/23  0956   PTHI 24 45 67*     Iron Studies  Recent Labs   Lab Test 11/21/23  0742 11/07/23  0753 10/05/23  0752 09/05/23  0702 08/07/23  0726 08/01/23  0924 11/22/22  0848   IRON 60* 53* 83 32* 28* 23* 68    226* 241 258 244 231* 219*   IRONSAT 22 23 34 12* 11* 10* 31   HOLA 204 341 919* 485* 440* 506* 429*       IMAGING:  All imaging studies reviewed by me.

## 2023-12-13 NOTE — PROGRESS NOTES
Antimicrobial Stewardship Team Note    Antimicrobial Stewardship Program - A joint venture between [facility] and  Physicians to optimize antibiotic management.  NOT a formal consult - Restricted Antimicrobial Review     Patient: Damion Quinones  MRN: [id number]  Allergies: Patient has no known allergies.    Brief Summary: Damion Quinones is a 68 year old male with a history of PAF, obesity, HTN, pre-diabetes, rheumatoid and psoriatic arthritis, CAD, and cirrhosis (alcohol + hemochromatosis) and ESKD (IgA nephropathy + ANCA vasculitis) s/p simultaneous liver and kidney transplant on 6/6/23, recurrent umbilical hernia and underwent planned redo mesh repair of umbilical hernia and minimal DHEERAJ of small bowel on 12/7/23 and sent home. Patient presented on 12/8/2023 with severe LLQ abdominal painradiating to rectum, nausea and vomiting.     History of [place]et Illness: Patient presented with Afib with RVR (-150s), afebrile, and no leukocytosis. Labs were notable for lactic acid 3.5, improved with IV fluids. CT abdomen/pelvis with increased small to moderate volume loculated ascites with continued small volume postoperative pneumoperitoneum and no evidence of hollow viscus perforation. On 12/10 overnight, patient became progressively hypotensive (SBP 50-80s) despite IV fluid resuscitation requiring pressor support, tachycardic (-140s) in the setting of Afib with RVR and tmax 100.1. Labs were notable for elevated lactic acid (peaked at 5.6) and procalcitonin 32.9. WBC within normal limits. Blood cultures were obtained and patient was started on empiric Zosyn and IV vancomycin for sepsis likely secondary to possible post-operative intraabdominal infection. IV vancomycin stopped on 12/10 given negative nares MRSA PCR test and blood cultures remain NGTD x3 days. Patient continued to have persistent sever abdominal pain, taken to the OR and underwent exploratory laparotomy, primary repair of the small  bowel perforation, abdominal washout, removal of mesh, and placement of 3 drains 1 in the pelvis 1 in the splenic bed and 1 in the subhepatic space on 12/10. Empiric fluconazole was added to Zosyn. Patient was extubated post-operatively to 2L oxygen, currently on room air and clinical improvement, transferred to the floor today.  Intraoperative aerobic and fungal cultures from the wound, tissue, and mesh are all growing 1+ Saccharomyces cerevisiae (we have requested susceptibility).          Active Anti-infective Medications   (From admission, onward)                 Start     Stop    12/12/23 1200  fluconazole  400 mg,   Intravenous,   100 mL/hr,   EVERY 24 HOURS        possible bowel perforation       --    12/11/23 0800  [Held by provider]  sulfamethoxazole-trimethoprim  1 tablet,   Oral or Feeding Tube,   DAILY        (On hold since Sun 12/10/2023 at 1420 until manually unheld; held by Avery Walter, Carlo Reason: NPO)   PCP prophylaxis      On hold since Sun 12/10/2023 at 1420 until manually unheld   Hold reason: NPO    --    12/09/23 0825  piperacillin-tazobactam  3.375 g,   Intravenous,   EVERY 6 HOURS        Possible post-op infection       --                  Assessment: Post-operative peritonitis s/p repair of bowel perforation, washout, mesh removal and drain placement   Patient remains afebrile and is now off pressors, and on room air with overall clinical improvement post-operatively. Drains remain in place, with decreasing output. Agree with continuing empiric Zosyn for coverage of aerobic and anaerobic enteric organisms. Intraoperative cultures are all growing Saccharomyces cerevisiae, a colonizer of digestive tract, especially in the setting of ingestion of yeast containing food. Invasive infection (i.e. isolation from blood, peritoneal cavities, and other sterile environments) in immunocompromised host may be associated with prolonged antibiotic use, TPN or probiotic use. Antifungal of choice for  S. cerevisiae has not been established yet. A literature review by Brian et al. reported a favorable response in 60% of 19 patients with invasive S. cerevisiae who received fluconazole alone compared to 77.7% of 31 patients receiving amphotericin B1. There seems to be limited data on the role of echinocandins, however, there are some case reports of eradication of fungemia with caspofungin. Given patient's clinical stability with surgical source control, it is reasonable to continue fluconazole pending susceptibility results. We recommend transplant ID consult for further guidance with selection of definitive antimicrobial selection, duration of therapy, and follow up.     Recommendations:  Continue current therapy with Zosyn and fluconazole  Adjust antifungal therapy based on susceptibly  Transplant ID consult for definitive antimicrobial selection and duration     References:   Keith P, Meera E, Katherin M, et al. Saccharomyces cerevisiae fungemia: an emerging infectious disease. Clin Infect Dis. Clin. Infect. Dis. 2005;40(11):6944-2678.    Pharmacy took the following actions: Electronic note created, Called/paged provider.    Discussed with ID Staff: MD Oma Dhaliwal, PharmD, BCIDP  Pager: [phone]    Vital Signs/Clinical Features:  Vitals         12/11 0700  12/12 0659 12/12 0700  12/13 0659 12/13 0700  12/13 1325   Most Recent      Temp ( F) 97.4 -  98.5    97.6 -  98.7    97.7 -  98.6     98.6 (37) 12/13 1200    Pulse 59 -  94    54 -  82    61 -  69     62 12/13 1200    Resp 15 -  27    8 -  29    15 -  17     16 12/13 1200    /61 -  153/81    132/80 -  165/104    144/91 -  155/82     155/82 12/13 1200    SpO2 (%) 92 -  97    93 -  98    91 -  98     98 12/13 1200            Labs  Estimated Creatinine Clearance: 72.3 mL/min (A) (based on SCr of 1.19 mg/dL (H)).  Recent Labs   Lab Test 12/10/23  0233 12/10/23  0631 12/10/23  1408 12/11/23  0327 12/12/23  0355 12/13/23  0351    CR 1.86* 1.85* 1.71* 1.83* 1.42* 1.19*       Recent Labs   Lab Test 09/25/23  0759 09/27/23  1152 09/28/23  0605 10/05/23  0752 10/10/23  0757 12/09/23  2152 12/10/23  0233 12/10/23  1408 12/11/23 0327 12/12/23 0355 12/13/23  0359   WBC 8.6  8.6 11.2*   < > 5.1  4.8   < > 6.2 5.7 5.9 5.5 9.2 9.1   ANEU 5.0 6.6  --  4.1  --  4.9 4.6 5.2  --   --   --    ALYM 0.5* 0.4*  --  0.5*  --  1.1 0.7* 0.2*  --   --   --    EUSEBIA 1.1 1.1  --  0.3  --  0.2 0.3 0.5  --   --   --    AEOS 0.2 0.1  --  0.2  --  0.1 0.0 0.0  --   --   --    HGB 11.8* 11.4*   < > 11.9*   < > 15.4 13.3 12.1* 10.2* 10.2* 10.4*   HCT 40.7 38.7*   < > 39.8*   < > 49.3 43.1 39.0* 33.5* 33.2* 33.8*   MCV 95 93   < > 92   < > 93 93 93 95 93 93    231   < > 247   < > 192 174 128* 124* 123* 110*    < > = values in this interval not displayed.       Recent Labs   Lab Test 12/10/23  0233 12/10/23  0631 12/10/23  1408 12/11/23 0327 12/12/23 0355 12/13/23  0359   BILITOTAL 1.1 1.2 1.5* 1.1 0.6 0.7   ALKPHOS 71 78 59 59 70 90   ALBUMIN 3.3* 2.9* 2.9* 2.7* 2.8* 3.0*   AST 26 49* 23 24 24 26   ALT 9 9 7 9 11 19       Recent Labs   Lab Test 12/16/22  1652 12/16/22  2004 01/15/23  1107 01/15/23  1140 06/10/23  0341 06/10/23  1012 06/28/23  0644 09/27/23  1817 09/29/23  0520 12/08/23  1109 12/08/23  1430 12/09/23  2152 12/10/23  0204 12/10/23  0511 12/10/23  1408 12/10/23  1731 12/11/23  0327   PCAL  --   --   --   --  8.66*  --   --   --   --   --   --  32.90*  --   --   --   --   --    LACT  --    < >  --    < > 1.3   < >  --    < >  --  2.6*   < > 4.0* 2.4* 2.1* 1.5 1.0 2.0   CRPI 77.70*  --  19.20*  --   --   --  83.22*  --  252.00* 9.18*  --   --   --   --   --   --   --    SED  --   --  11  --   --   --   --   --  66*  --   --   --   --   --   --   --   --     < > = values in this interval not displayed.       Recent Labs   Lab Test 06/19/23  1401 06/20/23  0604 11/21/23  0742 12/10/23  0511 12/13/23  0637   VANCOMYCIN 18.9  --   --   --   --     TACROL  --    < > 11.4   < > 9.2   MPACID  --    < > 1.08  --   --    MPAG  --    < > 55.1  --   --     < > = values in this interval not displayed.       Culture Results:  7-Day Micro Results       Procedure Component Value Units Date/Time    Anaerobic Bacterial Culture Routine [[id number]] Collected: 12/10/23 1001    Order Status: Completed Lab Status: Preliminary result Updated: 12/13/23 1232    Specimen: Foreign Body from Other      Culture No anaerobic organisms isolated after 3 days    Gram Stain [[id number]] Collected: 12/10/23 1001    Order Status: Canceled Lab Status: No result Updated: 12/10/23 1137    Specimen: Foreign Body from Other     Fungal or Yeast Culture Routine [[id number]]  (Abnormal) Collected: 12/10/23 1001    Order Status: Completed Lab Status: Preliminary result Updated: 12/12/23 1259    Specimen: Foreign Body from Other      Culture Yeast    Foreign Body Aerobic Bacterial Culture Routine [[id number]]  (Abnormal) Collected: 12/10/23 1001    Order Status: Completed Lab Status: Final result Updated: 12/12/23 1022    Specimen: Foreign Body from Other      Culture 1+ Saccharomyces cerevisiae     Comment: Susceptibilities not routinely done, refer to antibiogram to view typical susceptibility profiles       Anaerobic Bacterial Culture Routine [[id number]] Collected: 12/10/23 0959    Order Status: Completed Lab Status: Preliminary result Updated: 12/13/23 1216    Specimen: Tissue from Other      Culture No anaerobic organisms isolated after 3 days    Gram Stain [[id number]]  (Abnormal) Collected: 12/10/23 0959    Order Status: Completed Lab Status: Final result Updated: 12/10/23 1317    Specimen: Tissue from Other      Gram Stain Result 1+ Yeast      2+ WBC seen    Fungal or Yeast Culture Routine [[id number]]  (Abnormal) Collected: 12/10/23 0959    Order Status: Completed Lab Status: Preliminary result Updated: 12/12/23 1301    Specimen: Tissue from Other      Culture Yeast     Tissue Aerobic Bacterial Culture Routine [[id number]]  (Abnormal) Collected: 12/10/23 0959    Order Status: Completed Lab Status: Preliminary result Updated: 12/13/23 1033    Specimen: Tissue from Other      Culture 1+ Saccharomyces cerevisiae     Comment: Susceptibilities not routinely done, refer to antibiogram to view typical susceptibility profiles       Narrative:      Susceptibility testing requested by Gregory Méndez, pharmacist [id number]     Anaerobic Bacterial Culture Routine [[id number]] Collected: 12/10/23 0936    Order Status: Completed Lab Status: Preliminary result Updated: 12/13/23 1207    Specimen: Wound from Other      Culture No anaerobic organisms isolated after 3 days    Gram Stain [[id number]] Collected: 12/10/23 0936    Order Status: Completed Lab Status: Final result Updated: 12/10/23 1417    Specimen: Wound from Other      Gram Stain Result No organisms seen      No white blood cells seen    Fungal or Yeast Culture Routine [[id number]] Collected: 12/10/23 0936    Order Status: Completed Lab Status: Preliminary result Updated: 12/13/23 1207    Specimen: Wound from Other      Culture No growth after 3 days    Wound Aerobic Bacterial Culture Routine [[id number]]  (Abnormal) Collected: 12/10/23 0936    Order Status: Completed Lab Status: Final result Updated: 12/13/23 1126    Specimen: Wound from Other      Culture Isolated in broth only Saccharomyces cerevisiae     Comment: On day 2 of incubation  Susceptibilities not routinely done, refer to antibiogram to view typical susceptibility profiles       Narrative:      This specimen was received on a swab. Results may not be optimal. For maximum sensitivity of detection submit tissue, fluid or fine needle aspirate.          Respiratory Aerobic Bacterial Culture with Gram Stain     Order Status: Sent Lab Status: No result     Specimen: Sputum from Expectorate     Blood Culture Arm, Right [[id number]]  (Normal) Collected: 12/09/23 1336     Order Status: Completed Lab Status: Preliminary result Updated: 12/12/23 1404    Specimen: Blood from Arm, Right      Culture No growth after 3 days    Blood Culture Hand, Right [[id number]]  (Normal) Collected: 12/09/23 1207    Order Status: Completed Lab Status: Preliminary result Updated: 12/13/23 1316    Specimen: Blood from Hand, Right      Culture No growth after 4 days    Narrative:      Only an Aerobic Blood Culture Bottle was collected, interpret results with caution.        MRSA MSSA PCR, Nasal Swab [[id number]] Collected: 12/09/23 1148    Order Status: Completed Lab Status: Final result Updated: 12/09/23 1406    Specimen: Swab from Nares, Bilateral      MRSA Target DNA Negative     SA Target DNA Negative    Narrative:      The [employer]  Xpert SA Nasal Complete assay performed in the [employer] System is a qualitative in vitro diagnostic test designed for rapid detection of Staphylococcus aureus (SA) and methicillin-resistant Staphylococcus aureus (MRSA) from nasal swabs in patients at risk for nasal colonization. The test utilizes automated real-time polymerase chain reaction (PCR) to detect MRSA/SA DNA. The Xpert SA Nasal Complete assay is intended to aid in the prevention and control of MRSA/SA infections in healthcare settings. The assay is not intended to diagnose, guide or monitor treatment for MRSA/SA infections, or provide results of susceptibility to methicillin. A negative result does not preclude MRSA/SA nasal colonization.             Recent Labs   Lab Test 08/04/23  0715 08/29/23  1023 09/20/23  1645 09/25/23  0759 09/27/23  1157 11/07/23  0753 12/09/23  1405   URINEPH 6.5   < > 7.0 7.0 6.0 7.0 5.0   NITRITE Negative   < > Negative Negative Negative Negative Negative   LEUKEST Small*   < > Negative Negative Negative Trace* Negative   WBCU 0-5  --   --  None Seen 7* 10-25* 6*    < > = values in this interval not displayed.                   Recent Labs   Lab Test 09/25/23  2100   CDBPCT  Positive*       Imaging: IR CVC Tunnel Placement > 5 Yrs of Age    Result Date: 12/13/2023  Procedure 12/12/2023: Image guided tunneled central venous catheter placement. History: Patient is 68 year old?male?with hx of cirrhosis (EtOH and hemochromatosis) s/p liver transplant 6/6/23, PAF (on apixaban PTA), anemia, pre-diabetes, CAD who underwent a redo mesh repair of umbilical hernia complicated small bowel perforation and abdominal wash out. Due to recent surgical intervention, plan is NPO with PN for at least 7 days, then upper GI. Attempted PICC line placement with vascular access team was unsuccessful. 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 Patient is present for right EJ tunneled catheter placement . Comparison: None. Staff: Dr. Truong Chavarria M.D. Fellow: Ashkan Behzadi M.D. Monitoring/Medications: Patient received moderate sedation and was monitored by the nursing staff under the supervision of the higher attending. Vital signs remained stable throughout the procedure.  50 mcg Fentanyl 1% lidocaine used for local analgesia. Continuous face-to-face sedation time: 25 minutes Fluoro time: 0.9 minutes. Findings/procedure: The patient understood the limitations, alternatives, and risks of the procedure and requested the procedure be performed. Both written and oral consent were obtained. The right neck and upper chest were prepped and draped in the usual sterile fashion. 1% lidocaine without epinephrine was used for local anesthesia. Preprocedure right neck ultrasound redemonstrated occluded right internal jugular artery. Under ultrasound guidance, external  jugular venotomy was made with a micropuncture needle. Ultrasound image documenting jugular patency and needle venotomy was saved in the patient's record. Micropuncture needle exchanged over guidewire for the micropuncture sheath under fluoroscopic guidance. Catheter length measured with the 0.018 guidewire. Micropuncture sheath saline locked. 6  Malaysian Bard double lumen non-cuffed double catheter was subcutaneously tunneled from the right anterior chest to the right external  jugular venotomy site. Catheter cut on the 27 cm marker. Micropuncture sheath exchanged over guidewire for the peel-away sheath. Guidewire removed. Under fluoroscopic guidance, the catheter was placed through the peel-away sheath and positioned with its tip in the superior cavoatrial junction. Lumen flushed and aspirated adequately. Each lumen heparin locked with 100:1 heparin solution. 2-0 nylon catheter retaining suture and sterile dressing applied. Right external jugular venotomy site closed with surgical adhesive. No immediate complication. Fluoroscopic image documenting catheter placement and final position was saved in the patient's record.     IMPRESSION: 1. Ultrasound guided right jugular venotomy. 2. 6 Malaysian noncuffed tunneled central venous catheter placed with the tip in the superior cavoatrial junction. Both lumens heparin locked and ready for use.    XR Abdomen Port 1 View    Result Date: 12/12/2023  EXAMINATION:  XR ABDOMEN PORT 1 VIEW 12/12/2023 COMPARISON: Abdominal x-ray 12/10/2023 HISTORY: worsening distention. TECHNIQUE: One frontal supine view of the abdomen. FINDINGS: Enteric tube is not visualized. Multiple drains projecting over the lower abdomen and pelvis. No abnormally dilated air-filled loops of bowel.     IMPRESSION: 1. No abnormally dilated air-filled loops of bowel. Paucity of bowel gas. 2. Previously seen enteric tube is not visualized. It not removed consider radiograph of chest and upper abdomen to evaluate for possibility of retracted enteric tube I have personally reviewed the examination and initial interpretation and I agree with the findings. KURTIS JENSEN MD   SYSTEM ID:  [id number]    XR Chest Port 1 View    Result Date: 12/12/2023  EXAM: XR CHEST PORT 1 VIEW  12/12/2023 3:36 PM HISTORY:  picc   COMPARISON:  Chest radiograph 12/10/2023  FINDINGS: Portable upright view of the chest. Presumed interval removal of the endotracheal tube. New right IJ central venous catheter tip projects over the cavoatrial junction. Enteric tube tip and sidehole projected over the stomach. Stable cardiac mediastinal silhouette. Retrocardiac and left basilar opacities. Small left pleural effusion. No pneumothorax. No acute osseous abnormality. Surgical clips and embolization coils in upper abdomen Visualized upper abdomen is unremarkable.      IMPRESSION: 1. New right IJ central venous catheter tip at the cavoatrial junction. Presumed interval extubation. 2. Small left pleural effusions with retrocardiac and left basilar opacity likely atelectasis and/or edema. I have personally reviewed the examination and initial interpretation and I agree with the findings. GIBRAN FRASER MD   SYSTEM ID:  [id number]    US Upper Extremity Venous Duplex Right    Result Date: 12/11/2023  EXAMINATION: DOPPLER VENOUS ULTRASOUND OF THE RIGHT UPPER EXTREMITY, 12/11/2023 4:05 PM COMPARISON: Ultrasound 3/21/2023. HISTORY: Known prior right IJ chronic venous thrombosis TECHNIQUE:  Gray-scale evaluation with compression, spectral flow and color Doppler assessment of the deep venous system of the right upper extremity. FINDINGS: Partial compressibility of the right internal jugular vein with nonocclusive echogenic thrombus in the lumen. Normal blood flow and waveforms are demonstrated in the innominate, subclavian, and axillary veins. There is normal compressibility of the brachial, basilic and cephalic veins. Mild subcutaneous edema in the antecubital fossa.     IMPRESSION: Grossly unchanged appearance of chronic nonocclusive thrombosis in the right internal jugular vein when compared to 3/21/2023 ultrasound. I have personally reviewed the examination and initial interpretation and I agree with the findings. RIKI OSPINA DO   SYSTEM ID:  [id number]    XR Abdomen Port 1 View    Result Date:  12/10/2023  EXAMINATION:  XR ABDOMEN PORT 1 VIEW 12/10/2023 COMPARISON: CT abdomen/pelvis 12/9/2023 HISTORY: Verify NG TECHNIQUE: Frontal supine view of the abdomen. FINDINGS: Gastric tube tip and side-port project over the region of the stomach. New partially visualized intraperitoneal drain over the right lower quadrant. No abnormally dilated loops of bowel, free air, or pneumatosis in the visualized abdomen. No portal venous gas.     IMPRESSION: 1. Gastric tube tip and side-port project over the stomach. 2. New right lower quadrant intraperitoneal drain is partially visualized. 3. Nonobstructive bowel gas pattern. I have personally reviewed the examination and initial interpretation and I agree with the findings. HEBERT MAYERS MD   SYSTEM ID:  [id number]    XR Chest Port 1 View    Result Date: 12/10/2023  EXAM: XR CHEST PORT 1 VIEW 12/10/2023 1:48 PM DEMOGRAPHICS: Male of 68 years INDICATION: pneumo eval COMPARISON: 12/9/2023, 12/8/2023. TECHNIQUE: Single portable AP view of the chest. FINDINGS: New endotracheal tube tip projects at the mid thoracic trachea. Gastric tube side port projects at the stomach. Low lung volumes. Improved aeration of the left lung base. Stable appearance of the mediastinum and cardiac silhouette. Silhouetting of the left hemidiaphragm and dense retrocardiac opacity. Small right pleural effusion. No pneumothorax. Upper abdomen is unremarkable with stable position of surgical material.     IMPRESSION: 1.  The endotracheal tube tip projects at the mid thoracic trachea. 2.  Low lung volumes with elements of atelectasis. No pneumothorax. 3.  Continued left lower lobe atelectasis and unchanged small right pleural effusion. I have personally reviewed the examination and initial interpretation and I agree with the findings. HEBERT MAYERS MD   SYSTEM ID:  [id number]    US Renal Transplant with Doppler    Result Date: 12/10/2023  EXAMINATION: US RENAL TRANSPLANT,  12/10/2023 8:30 AM COMPARISON:  Ultrasound 9/20/2023, CT 12/9/2023 HISTORY: s/p kidney transplant, shock TECHNIQUE:  Grey-scale, color Doppler and spectral flow analysis. FINDINGS: The transplant kidney is located in the right lower quadrant, and measures 11.8 cm. Parenchyma is of normal thickness and echogenicity. No focal lesions. No hydronephrosis. No perinephric fluid collection. Renal artery flow: 72 cm/sec peak systolic at hilum. Not well visualized at the anastomosis due to overlying bowel gas. Arcuate artery resistive indices (upper to lower): 0.73, 0.63, 0.58 Renal Vein Flow: 12 cm/s at hilum. Not well visualized at the anastomosis due to overlying bowel gas. Iliac artery flow: Not well visualized above the anastomosis due to overlying bowel gas. 71 cm/s peak systolic below anastomosis. Iliac vein flow: Not well visualized above the anastomosis due to overlying bowel gas. Patent below the anastomosis. Other: Partially visualized small volume complex/loculated ascites in the right lower quadrant.     IMPRESSION: 1. Normal grayscale evaluation of the right lower quadrant transplant kidney. 2. Partially limited Doppler evaluation due to overlying bowel gas. The visualized renal transplant vasculature is patent. 3. Complex ascites. I have personally reviewed the examination and initial interpretation and I agree with the findings. RIKI OSPINA DO   SYSTEM ID:  [id number]    US Liver Transplant    Result Date: 12/10/2023  EXAMINATION: US LIVER TRANSPLANT, 12/10/2023 8:30 AM COMPARISON: Ultrasound 9/27/2023 abdominal CT 12/9/2023 HISTORY: s/p liver transplant in 6/2023, shock TECHNIQUE:  Gray-scale, color Doppler and spectral flow analysis. FINDINGS: There is small volume complex/loculated ascites in the lower quadrants and left upper quadrant. Liver:   The liver demonstrates normal homogeneous echotexture. No evidence of a focal hepatic mass. Bile Ducts: Both the intra- and extrahepatic biliary system are of normal caliber.  The common bile  duct measures 4 mm in diameter. Gallbladder: The gallbladder is surgically absent. Kidneys: The native kidneys are not well visualized. Pancreas: Not well visualized. Spleen:  The spleen is mildly enlarged, measuring 14.6 cm. Visualized portions of the aorta are unremarkable. LIVER DOPPLER: Splenic vein:  Patent continuous normal antegrade direction flow towards the liver, 33 cm/sec. Extrahepatic portal vein: Not well visualized due to overlying bowel gas. Portal vein at anastomosis: Patent continuous antegrade flow, 50 cm/sec. Intrahepatic portal vein:  Patent continuous antegrade flow, 37 cm/sec. Right portal vein flow is antegrade, measuring 54 cm/sec. Left portal vein flow is antegrade, measuring 17 cm/sec. Inferior vena cava: patent with flow toward the heart throughout.. IVC above anastomosis: Not well-visualized. IVC at anastomosis:  105 cm/sec. Intrahepatic IVC:  54 cm/sec. Extrahepatic IVC: not well visualized. Right, mid, left hepatic veins: Patent with flow towards the inferior vena cava. Extrahepatic hepatic artery: Low resistance waveform with flow towards the liver. 98 cm/sec with resistive index 0.64. Right hepatic artery: Not well visualized. Left hepatic artery: 79 cm/sec with resistive index 0.59.     Impression: 1. Examination is partially limited due to overlying bowel gas. 2. The visualized hepatic vasculature is patent with antegrade flow. 3. Normal grayscale appearance of the transplant liver. 4. Mild splenomegaly. 5. Small volume ascites. I have personally reviewed the examination and initial interpretation and I agree with the findings. RIKI OSPINA DO   SYSTEM ID:  [id number]    XR Chest Port 1 View    Result Date: 12/10/2023  EXAM: Chest radiograph 12/9/2023 9:34 PM HISTORY: increasing O2 demands. COMPARISON: Same day chest radiograph. TECHNIQUE: Portable AP view of the chest. FINDINGS: Enteric tube courses below the field-of-view. The cardiomediastinal contours are stable. Persistent  retrocardiac silhouetting and blunting of the left costophrenic angle. Minimal blunting of the right costophrenic angle. Bandlike atelectasis in the lingula and left midlung zone. Continued streaky right perihilar and right basilar opacities. There is no pneumothorax. Embolization coils project over the upper abdomen.     IMPRESSION: Continued streaky perihilar and left greater than right basilar opacities, slightly increased in the left lung base, likely atelectasis associated with a small left greater than right pleural effusions. I have personally reviewed the examination and initial interpretation and I agree with the findings. RIKI OSPINA DO   SYSTEM ID:  [id number]    US Lower Extremity Venous Duplex Bilateral    Result Date: 2023  EXAMINATION: DOPPLER VENOUS ULTRASOUND OF BILATERAL LOWER EXTREMITIES, 2023 3:36 PM COMPARISON: 2023. HISTORY: Evaluate for DVT due to tachycardia. Concern for PE and unable to give IV contrast. TECHNIQUE:  Gray-scale evaluation with compression, spectral flow and color Doppler assessment of the deep venous system of both legs from groin to knee, and then at the ankles. FINDINGS: In both lower extremities, the common femoral, femoral, popliteal and posterior tibial veins demonstrate normal compressibility and blood flow. The peroneal veins are not visualized.     IMPRESSION: Peroneal veins are not visualized, otherwise no DVT demonstrated in either lower extremity I have personally reviewed the examination and initial interpretation and I agree with the findings. KURTIS JENSEN MD   SYSTEM ID:  [id number]    Echo Complete    Result Date: 2023  [id number]^DIANE^ELIZA^THAI  [facility] [facility] [address] [place] [id number]  Name: EMILIANA SCHREIBER MRN: [id number] : 1955 Study Date: 2023 11:06 AM Age: 68 yrs Gender: Male [place] [facility] [place]:  Heart Failure Ordering Physician: ELIZA CONTRERAS Performed By: Virginia Sharif RDCS  BSA: 2.2 m2 Height: 67 in Weight: 235 lb ______________________________________________________________________________ Procedure Complete Portable Echo Adult. Contrast Optison. Technically difficult study.Extremely difficult acoustic windows despite the use of contrast for endcardial border definition. ______________________________________________________________________________ Interpretation Summary Technically difficult study.Extremely difficult acoustic windows despite the use of contrast for endcardial border definition. Global and regional left ventricular function is normal with an EF of 55-60%. Right ventricular function cannot be assessed due to poor image quality. No pericardial effusion is present. ______________________________________________________________________________ Left Ventricle Global and regional left ventricular function is normal with an EF of 55-60%.  Right Ventricle Right ventricular function cannot be assessed due to poor image quality.  Mitral Valve Mild mitral annular calcification is present. Trace mitral insufficiency is present.  Aortic Valve Aortic valve sclerosis is present. On Doppler interrogation, there is no significant stenosis or regurgitation.  Vessels The inferior vena cava cannot be assessed.  Pericardium No pericardial effusion is present.  Compared to Previous Study The left ventricular function has remained the same.  ______________________________________________________________________________ Doppler Measurements & Calculations TR max xander: 189.6 cm/sec TR max P.4 mmHg  ______________________________________________________________________________ Report approved by: MD Shaggy Castle 2023 11:51 AM       CT Abdomen Pelvis w/o Contrast    Result Date: 2023  EXAMINATION: CT ABDOMEN PELVIS W/O CONTRAST, 2023 10:02 AM TECHNIQUE:  Helical CT  images from the lung bases through the pubic symphysis were obtained without IV contrast. COMPARISON: 12/8/2023 HISTORY: look for any free air FINDINGS: Abdomen and pelvis: Stable surgical changes of hepatic transplantation. No appreciable focal hepatic lesion on these noncontrast images. Surgically absent gallbladder. No intra or extrahepatic biliary dilation. Mildly atrophic pancreas. Nonenlarged spleen. Stable macroscopic fat containing right adrenal myelolipoma measuring 9 mm. Stable microscopic fat-containing left adrenal adenoma measuring 1.3 cm. Atrophic native kidneys with unchanged fluid attenuation cysts arising from the lower pole of the left kidney. Right lower quadrant renal transplant cortical lesion. No hydronephrosis, hydroureter, or urinary tract stone. Excreted contrast within the calyces of the right lower quadrant renal transplant and within the bladder lumen. Dystrophic calcifications in the mildly enlarged prostate. Symmetric seminal vesicles. Increased small to moderate volume ascites with loculated components along the anterior aspect of the stomach, within the greater omentum, and within the central mesentery. Intermediate to high attenuation material collecting within the ascites in the pelvis anterior to the rectum. Scattered tiny foci of free air throughout the anterior intraperitoneal space. Gastric tube tip and sidehole in the stomach. No dilated small or large bowel. Mild to moderate colonic stool burden. Scattered air-fluid levels throughout the small bowel. Minimal dilute oral contrast opacifies a few loops of small bowel in the central abdomen. No appreciable extraluminal leakage of contrast. Moderate aortoiliac atherosclerotic calcification without aneurysmal dilation. No abdominal or pelvic lymphadenopathy. Lung bases:  Small left and trace right pleural effusions with adjacent atelectasis greatest in the left lower lobe. Cardiomegaly with moderate coronary artery calcifications.  Bones and soft tissues: Umbilical hernia repair with mesh with continued loculated fluid and foci of air within the periumbilical subcutaneous fat. Additional foci of postoperative subcutaneous emphysema within the supraumbilical abdominal wall subcutaneous fat. Upper abdominal and right lower quadrant abdominal wall incisional scar is related to hepatic and renal transplantations. Abdominal wall and upper thigh subcutaneous edema. Bilateral gynecomastia. Multilevel degenerative change in the spine. No acute or aggressive osseous abnormality. Avascular necrosis of the left femoral head without evidence of subchondral collapse.     IMPRESSION: 1. Increased small to moderate volume loculated ascites with continued small volume postoperative pneumoperitoneum status post umbilical hernia repair with mesh. No evidence of hollow viscus perforation. Small amount of intermediate to high attenuation material collecting within the ascites in the pelvis may represent postoperative blood products, however, no evidence of acute hemorrhage. 2. Stable surgical changes of hepatic and right lower quadrant renal transplantation. 3. Small left and trace right pleural effusions with adjacent atelectasis. 4. Other chronic and incidental findings as detailed in the body of the report. RIKI OSPINA DO   SYSTEM ID:  [id number]    XR Abdomen Port 1 View    Result Date: 12/9/2023  EXAMINATION:  XR ABDOMEN PORT 1 VIEW 12/9/2023 COMPARISON: 12/8/2023 CT. HISTORY: Admit with N/V s/p hernia repair. Received PO contrast for CT scan at 13:30. Please follow contrast to see if it makes it to colon.. TECHNIQUE: Frontal supine view of the abdomen. FINDINGS: No appreciable oral contrast opacifying the bowel. No abnormally dilated loops of bowel, free air, or pneumatosis in the visualized abdomen.. No portal venous gas.  Embolization coils and surgical clips project over the upper abdomen.     IMPRESSION: 1. No appreciable oral contrast opacifying the  bowel, likely due to low concentration required for CT imaging and dilution since administration. 2. Nonobstructive bowel gas pattern. I have personally reviewed the examination and initial interpretation and I agree with the findings. RIKI OSPINA DO   SYSTEM ID:  [id number]    XR Chest Port 1 View    Result Date: 12/9/2023  EXAM: XR CHEST PORT 1 VIEW  12/9/2023 8:30 AM  HISTORY: worsening hypoxia COMPARISON: 12/20/2023 FINDINGS: Single view of the chest. Enteric tube courses inferiorly below the diaphragm with tip out of the field of view. Trachea is midline. Stable enlarged cardiac silhouette. Low lung volumes. Streaky perihilar and bibasilar pulmonary opacities. No definite pleural effusion, though the left costophrenic angle is collimated out of the field-of-view. No pneumothorax.     IMPRESSION: Cardiomegaly with streaky perihilar and bibasilar pulmonary opacities, likely representing pulmonary edema and atelectasis in the setting of low lung volumes. I have personally reviewed the examination and initial interpretation and I agree with the findings. RIKI OSPINA DO   SYSTEM ID:  [id number]    XR Abdomen Port 1 View    Result Date: 12/8/2023  EXAM: XR ABDOMEN PORT 1 VIEW LOCATION: [facility] DATE: 12/8/2023 INDICATION: s p NGT placement COMPARISON: CT 12/8/2023     IMPRESSION: Enteric decompression tube and side hole in the proximal stomach. Nonobstructive bowel gas pattern.    XR Chest Port 1 View    Result Date: 12/8/2023  Exam: XR CHEST PORT 1 VIEW  12/8/2023 7:52 PM History:  dyspnea   Comparison:  11/21/2023 Findings: Single view of the chest. Stable enlarged cardiac silhouette. No significant pleural effusion or pneumothorax. Diffuse interstitial prominence with hazy bibasilar opacities. No acute osseous or upper abdominal abnormality.     Impression: Cardiomegaly with interstitial and bibasilar opacities favored to represent pulmonary edema and/or  atelectasis. I have personally reviewed the examination and initial interpretation and I agree with the findings. DELONTE CANTU MD   SYSTEM ID:  [id number]    CT Abdomen Pelvis w/o Contrast    Result Date: 12/8/2023  EXAM: Abdomen/pelvis CT without contrast 12/8/2023 1:33 PM HISTORY: 68 years Male With PO contrast, no IV. s/p Umbilical hernia repair yesterday now with severe LLQ pain, emesis x4, and rectal pain.. COMPARISON: None. TECHNIQUE: Axial CT images from the lung bases through the symphysis pubis were obtained without intravenous contrast. Oral contrast was administered. Coronal and sagittal reformats provided. FINDINGS: Limited evaluation secondary to lack of intravenous contrast.  image(s) are noncontributory. LINES/TUBES: None. HEPATIC: Postoperative changes of liver transplantation. No suspicious hepatic mass. No intrahepatic biliary ductal dilation.. BILIARY: The gallbladder is surgically absent.. No extrahepatic biliary ductal dilation.. SPLEEN: Normal size. No focal lesions. PANCREAS: Fatty atrophy of the pancreatic parenchyma. No pancreatic mass, peripancreatic fluid collection or acute inflammatory changes.. ADRENALS: Unremarkable. RENAL: A trophic appearance of the bilateral native kidneys with perinephric fat stranding. Normal parenchymal appearance of the right lower quadrant transplant kidney. No hydronephrosis, mass, or obstructive nephrolithiasis. URINARY BLADDER: Moderately dilated. No focal wall defect or mass. REPRODUCTIVE: Hydrocele of the left testicle.. GASTROINTESTINAL: Surgical clips near the esophageal hiatus. The distal esophagus is unremarkable. The stomach is moderately dilated with intraluminal contrast. Contrast opacifies duodenum and portions of the jejunum. There are dilated or decompressed segments of small or large bowel to suggest obstruction. There is moderate stool within the colon. No evidence of appendicitis no extraluminal extension of contrast to suggest bowel  perforation or fistulous tract. MESENTERY/PERITONEUM: There is a small-to-moderate ascites within the abdomen, collecting somewhat symmetrically about the lower abdominal quadrants. There is a small volume ascites along the inferior hepatic border. Small volume of pneumoperitoneum is noted;  likely postsurgical given recent umbilical hernia repair. There is diffuse nonspecific abdominal fat stranding, which is expected given HISTORY of multiple intra-abdominal procedures. No organized fluid collection in abdomen or pelvis. Embolization coils within the left upper quadrant adjacent to the inferior border of the stomach are stable. LYMPH NODES: No definite intra-abdominal or pelvic lymphadenopathy.. VASCULAR: Moderate atherosclerotic vascular calcifications. LUNG BASES: No focal pulmonary consolidation. Bibasilar atelectasis. No suspicious pulmonary nodule or mass. Trace bilateral pleural effusions. The cardiac silhouette size is enlarged. Multivessel coronary artery calcifications.. MUSCULOSKELETAL: Degenerative changes in keeping with the patient's age. No acute osseous abnormality. Left femoral head and avascular necrosis which is stable from previous exam. No evidence of collapse. Slight stable sclerosis of the right femoral head may also represent minimally avascular necrosis change. SOFT TISSUES: Postsurgical changes of umbilical hernia repair and mesh placement. Small volume of subcutaneous emphysema about the umbilicus with adjacent fat stranding of the soft tissues. There is fat stranding along the right lateral abdominal wall, likely representing prior surgical tract.     IMPRESSION: 1.  Postoperative changes of umbilical hernia repair and mesh placement. There is a small volume of expected postoperative pneumoperitoneum. Mild-to-moderate volume of abdominal ascites which could be postsurgical in nature. 2.  There is no evidence of bowel perforation or obstruction. 3.  Postsurgical changes of the liver and  kidney transplantation. No evidence of focal complication. 4.  Lung bases are clear. 5.  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 I have personally reviewed the examination and initial interpretation and I agree with the findings. MARK ALVAERZ MD   SYSTEM ID:  [id number]    XR Abdomen 2 Views    Result Date: 12/8/2023  EXAM: Abdominal radiograph 12/8/2023 11:32 AM HISTORY: 68 years Male abdl pain s/p umbilical hernia surgery yesterday. COMPARISON: CT abdomen/pelvis 9/27/2023. Abdominal radiograph 8/1/2023 TECHNIQUE: Single frontal supine view(s) of the abdomen. FINDINGS: Postsurgical changes of umbilical hernia repair which mesh placement, kidney and liver transplant. There surgical clips overlying the pelvis and upper abdomen. Coiling material within the left upper quadrant. Nonobstructive bowel gas pattern. No pneumatosis or portal venous gas. No free intra-abdominal air. No suspicious abdominal calcifications. The bony structures appear intact. Vascular calcifications. Soft tissues are unremarkable. Lung bases are relatively clear...     IMPRESSION: No obstructive bowel gas pattern. I have personally reviewed the examination and initial interpretation and I agree with the findings. MARK ALVAREZ MD   SYSTEM ID:  [id number]    POC US Guidance Needle Placement    Result Date: 12/7/2023  Bilateral Rectus Sheath Block

## 2023-12-13 NOTE — PLAN OF CARE
Major Shift Events:  A/oX4. Afib CVR. NC 2L. Multiple Bms-between commode and bed majority of day. NG with LIS. Lasix given with good response. Fem CVC removed. IR placed tunneled line. 7A orders.     Plan: Remove stauffer, transfer 7A when bed available.     For vital signs and complete assessments, please see documentation flowsheets.

## 2023-12-14 ENCOUNTER — APPOINTMENT (OUTPATIENT)
Dept: OCCUPATIONAL THERAPY | Facility: CLINIC | Age: 68
End: 2023-12-14
Payer: COMMERCIAL

## 2023-12-14 LAB
ALBUMIN SERPL BCG-MCNC: 2.8 G/DL (ref 3.5–5.2)
ALP SERPL-CCNC: 106 U/L (ref 40–150)
ALT SERPL W P-5'-P-CCNC: 23 U/L (ref 0–70)
ANION GAP SERPL CALCULATED.3IONS-SCNC: 15 MMOL/L (ref 7–15)
AST SERPL W P-5'-P-CCNC: 36 U/L (ref 0–45)
ATRIAL RATE - MUSE: NORMAL BPM
BACTERIA BLD CULT: NO GROWTH
BACTERIA BLD CULT: NO GROWTH
BACTERIA TISS BX CULT: ABNORMAL
BILIRUB DIRECT SERPL-MCNC: 0.33 MG/DL (ref 0–0.3)
BILIRUB SERPL-MCNC: 0.8 MG/DL
BUN SERPL-MCNC: 42.2 MG/DL (ref 8–23)
C DIFF GDH STL QL IA: POSITIVE
C DIFF TOX A+B STL QL IA: NEGATIVE
C DIFF TOX B STL QL: POSITIVE
CALCIUM SERPL-MCNC: 8.4 MG/DL (ref 8.8–10.2)
CHLORIDE SERPL-SCNC: 105 MMOL/L (ref 98–107)
CREAT SERPL-MCNC: 1.02 MG/DL (ref 0.67–1.17)
DEPRECATED HCO3 PLAS-SCNC: 21 MMOL/L (ref 22–29)
DIASTOLIC BLOOD PRESSURE - MUSE: NORMAL MMHG
EGFRCR SERPLBLD CKD-EPI 2021: 81 ML/MIN/1.73M2
ERYTHROCYTE [DISTWIDTH] IN BLOOD BY AUTOMATED COUNT: 17.2 % (ref 10–15)
GLUCOSE BLDC GLUCOMTR-MCNC: 141 MG/DL (ref 70–99)
GLUCOSE BLDC GLUCOMTR-MCNC: 145 MG/DL (ref 70–99)
GLUCOSE BLDC GLUCOMTR-MCNC: 146 MG/DL (ref 70–99)
GLUCOSE BLDC GLUCOMTR-MCNC: 156 MG/DL (ref 70–99)
GLUCOSE BLDC GLUCOMTR-MCNC: 158 MG/DL (ref 70–99)
GLUCOSE BLDC GLUCOMTR-MCNC: 162 MG/DL (ref 70–99)
GLUCOSE SERPL-MCNC: 169 MG/DL (ref 70–99)
HCT VFR BLD AUTO: 39 % (ref 40–53)
HGB BLD-MCNC: 12 G/DL (ref 13.3–17.7)
INTERPRETATION ECG - MUSE: NORMAL
MAGNESIUM SERPL-MCNC: 1.8 MG/DL (ref 1.7–2.3)
MCH RBC QN AUTO: 28.8 PG (ref 26.5–33)
MCHC RBC AUTO-ENTMCNC: 30.8 G/DL (ref 31.5–36.5)
MCV RBC AUTO: 94 FL (ref 78–100)
P AXIS - MUSE: NORMAL DEGREES
PHOSPHATE SERPL-MCNC: 2.5 MG/DL (ref 2.5–4.5)
PLATELET # BLD AUTO: 104 10E3/UL (ref 150–450)
POTASSIUM SERPL-SCNC: 3.7 MMOL/L (ref 3.4–5.3)
PR INTERVAL - MUSE: NORMAL MS
PROT SERPL-MCNC: 5.5 G/DL (ref 6.4–8.3)
QRS DURATION - MUSE: 82 MS
QT - MUSE: 360 MS
QTC - MUSE: 415 MS
R AXIS - MUSE: 39 DEGREES
RBC # BLD AUTO: 4.16 10E6/UL (ref 4.4–5.9)
SODIUM SERPL-SCNC: 141 MMOL/L (ref 135–145)
SYSTOLIC BLOOD PRESSURE - MUSE: NORMAL MMHG
T AXIS - MUSE: -4 DEGREES
TACROLIMUS BLD-MCNC: 9.1 UG/L (ref 5–15)
TME LAST DOSE: NORMAL H
TME LAST DOSE: NORMAL H
UFH PPP CHRO-ACNC: 0.23 IU/ML
UFH PPP CHRO-ACNC: 0.28 IU/ML
UFH PPP CHRO-ACNC: 0.33 IU/ML
VENTRICULAR RATE- MUSE: 80 BPM
WBC # BLD AUTO: 8.2 10E3/UL (ref 4–11)

## 2023-12-14 PROCEDURE — 36415 COLL VENOUS BLD VENIPUNCTURE: CPT | Performed by: SURGERY

## 2023-12-14 PROCEDURE — 120N000011 HC R&B TRANSPLANT UMMC

## 2023-12-14 PROCEDURE — 80197 ASSAY OF TACROLIMUS: CPT | Performed by: NURSE PRACTITIONER

## 2023-12-14 PROCEDURE — 250N000011 HC RX IP 250 OP 636: Mod: JZ | Performed by: STUDENT IN AN ORGANIZED HEALTH CARE EDUCATION/TRAINING PROGRAM

## 2023-12-14 PROCEDURE — 250N000013 HC RX MED GY IP 250 OP 250 PS 637

## 2023-12-14 PROCEDURE — 85027 COMPLETE CBC AUTOMATED: CPT | Performed by: STUDENT IN AN ORGANIZED HEALTH CARE EDUCATION/TRAINING PROGRAM

## 2023-12-14 PROCEDURE — 87493 C DIFF AMPLIFIED PROBE: CPT | Performed by: NURSE PRACTITIONER

## 2023-12-14 PROCEDURE — 85520 HEPARIN ASSAY: CPT | Performed by: SURGERY

## 2023-12-14 PROCEDURE — 250N000011 HC RX IP 250 OP 636

## 2023-12-14 PROCEDURE — 36591 DRAW BLOOD OFF VENOUS DEVICE: CPT | Performed by: SURGERY

## 2023-12-14 PROCEDURE — 258N000003 HC RX IP 258 OP 636

## 2023-12-14 PROCEDURE — 83735 ASSAY OF MAGNESIUM: CPT | Performed by: SURGERY

## 2023-12-14 PROCEDURE — 250N000011 HC RX IP 250 OP 636: Performed by: SURGERY

## 2023-12-14 PROCEDURE — 84100 ASSAY OF PHOSPHORUS: CPT | Performed by: SURGERY

## 2023-12-14 PROCEDURE — 258N000003 HC RX IP 258 OP 636: Performed by: SURGERY

## 2023-12-14 PROCEDURE — 02HV33Z INSERTION OF INFUSION DEVICE INTO SUPERIOR VENA CAVA, PERCUTANEOUS APPROACH: ICD-10-PCS | Performed by: STUDENT IN AN ORGANIZED HEALTH CARE EDUCATION/TRAINING PROGRAM

## 2023-12-14 PROCEDURE — 250N000011 HC RX IP 250 OP 636: Performed by: PHYSICIAN ASSISTANT

## 2023-12-14 PROCEDURE — B4185 PARENTERAL SOL 10 GM LIPIDS: HCPCS | Mod: JZ | Performed by: SURGERY

## 2023-12-14 PROCEDURE — 87324 CLOSTRIDIUM AG IA: CPT | Performed by: NURSE PRACTITIONER

## 2023-12-14 PROCEDURE — 80053 COMPREHEN METABOLIC PANEL: CPT | Performed by: STUDENT IN AN ORGANIZED HEALTH CARE EDUCATION/TRAINING PROGRAM

## 2023-12-14 PROCEDURE — 250N000009 HC RX 250: Mod: JZ | Performed by: SURGERY

## 2023-12-14 PROCEDURE — 250N000012 HC RX MED GY IP 250 OP 636 PS 637: Performed by: NURSE PRACTITIONER

## 2023-12-14 PROCEDURE — 0JH63XZ INSERTION OF TUNNELED VASCULAR ACCESS DEVICE INTO CHEST SUBCUTANEOUS TISSUE AND FASCIA, PERCUTANEOUS APPROACH: ICD-10-PCS | Performed by: STUDENT IN AN ORGANIZED HEALTH CARE EDUCATION/TRAINING PROGRAM

## 2023-12-14 PROCEDURE — 36415 COLL VENOUS BLD VENIPUNCTURE: CPT | Performed by: NURSE PRACTITIONER

## 2023-12-14 PROCEDURE — 250N000011 HC RX IP 250 OP 636: Mod: JZ

## 2023-12-14 PROCEDURE — 97530 THERAPEUTIC ACTIVITIES: CPT | Mod: GO

## 2023-12-14 PROCEDURE — 82248 BILIRUBIN DIRECT: CPT | Performed by: NURSE PRACTITIONER

## 2023-12-14 PROCEDURE — 250N000011 HC RX IP 250 OP 636: Performed by: NURSE PRACTITIONER

## 2023-12-14 PROCEDURE — 250N000009 HC RX 250: Performed by: STUDENT IN AN ORGANIZED HEALTH CARE EDUCATION/TRAINING PROGRAM

## 2023-12-14 PROCEDURE — 99233 SBSQ HOSP IP/OBS HIGH 50: CPT | Mod: 24 | Performed by: INTERNAL MEDICINE

## 2023-12-14 PROCEDURE — 250N000011 HC RX IP 250 OP 636: Performed by: STUDENT IN AN ORGANIZED HEALTH CARE EDUCATION/TRAINING PROGRAM

## 2023-12-14 PROCEDURE — 250N000009 HC RX 250: Performed by: NURSE PRACTITIONER

## 2023-12-14 PROCEDURE — C9113 INJ PANTOPRAZOLE SODIUM, VIA: HCPCS

## 2023-12-14 PROCEDURE — 258N000003 HC RX IP 258 OP 636: Performed by: STUDENT IN AN ORGANIZED HEALTH CARE EDUCATION/TRAINING PROGRAM

## 2023-12-14 RX ORDER — DEXTROSE MONOHYDRATE 50 MG/ML
INJECTION, SOLUTION INTRAVENOUS
Status: COMPLETED
Start: 2023-12-14 | End: 2023-12-14

## 2023-12-14 RX ORDER — METRONIDAZOLE 500 MG/100ML
500 INJECTION, SOLUTION INTRAVENOUS EVERY 12 HOURS
Status: DISCONTINUED | OUTPATIENT
Start: 2023-12-14 | End: 2023-12-19

## 2023-12-14 RX ORDER — MAGNESIUM SULFATE HEPTAHYDRATE 40 MG/ML
2 INJECTION, SOLUTION INTRAVENOUS ONCE
Qty: 50 ML | Refills: 0 | Status: COMPLETED | OUTPATIENT
Start: 2023-12-14 | End: 2023-12-14

## 2023-12-14 RX ORDER — VANCOMYCIN HYDROCHLORIDE 125 MG/1
125 CAPSULE ORAL 4 TIMES DAILY
Status: DISCONTINUED | OUTPATIENT
Start: 2023-12-14 | End: 2023-12-20 | Stop reason: HOSPADM

## 2023-12-14 RX ORDER — POTASSIUM CHLORIDE 29.8 MG/ML
20 INJECTION INTRAVENOUS ONCE
Qty: 50 ML | Refills: 0 | Status: COMPLETED | OUTPATIENT
Start: 2023-12-14 | End: 2023-12-14

## 2023-12-14 RX ADMIN — OLIVE OIL AND SOYBEAN OIL 250 ML: 16; 4 INJECTION, EMULSION INTRAVENOUS at 20:39

## 2023-12-14 RX ADMIN — MYCOPHENOLATE MOFETIL 500 MG: 500 INJECTION, POWDER, LYOPHILIZED, FOR SOLUTION INTRAVENOUS at 22:42

## 2023-12-14 RX ADMIN — PIPERACILLIN AND TAZOBACTAM 3.38 G: 3; .375 INJECTION, POWDER, LYOPHILIZED, FOR SOLUTION INTRAVENOUS at 03:40

## 2023-12-14 RX ADMIN — PANTOPRAZOLE SODIUM 40 MG: 40 INJECTION, POWDER, FOR SOLUTION INTRAVENOUS at 08:41

## 2023-12-14 RX ADMIN — ONDANSETRON 4 MG: 2 INJECTION INTRAMUSCULAR; INTRAVENOUS at 18:06

## 2023-12-14 RX ADMIN — MYCOPHENOLATE MOFETIL 500 MG: 500 INJECTION, POWDER, LYOPHILIZED, FOR SOLUTION INTRAVENOUS at 08:41

## 2023-12-14 RX ADMIN — PIPERACILLIN AND TAZOBACTAM 3.38 G: 3; .375 INJECTION, POWDER, LYOPHILIZED, FOR SOLUTION INTRAVENOUS at 14:39

## 2023-12-14 RX ADMIN — POTASSIUM PHOSPHATE, MONOBASIC POTASSIUM PHOSPHATE, DIBASIC: 224; 236 INJECTION, SOLUTION, CONCENTRATE INTRAVENOUS at 20:37

## 2023-12-14 RX ADMIN — PIPERACILLIN AND TAZOBACTAM 3.38 G: 3; .375 INJECTION, POWDER, LYOPHILIZED, FOR SOLUTION INTRAVENOUS at 08:41

## 2023-12-14 RX ADMIN — METOPROLOL TARTRATE 5 MG: 5 INJECTION INTRAVENOUS at 16:35

## 2023-12-14 RX ADMIN — DEXTROSE MONOHYDRATE 1000 ML: 50 INJECTION, SOLUTION INTRAVENOUS at 22:35

## 2023-12-14 RX ADMIN — MAGNESIUM SULFATE IN WATER 2 G: 40 INJECTION, SOLUTION INTRAVENOUS at 09:15

## 2023-12-14 RX ADMIN — METHYLPREDNISOLONE SODIUM SUCCINATE 4 MG: 40 INJECTION INTRAMUSCULAR; INTRAVENOUS at 08:40

## 2023-12-14 RX ADMIN — POTASSIUM PHOSPHATE, MONOBASIC POTASSIUM PHOSPHATE, DIBASIC 9 MMOL: 224; 236 INJECTION, SOLUTION, CONCENTRATE INTRAVENOUS at 13:20

## 2023-12-14 RX ADMIN — ONDANSETRON 4 MG: 2 INJECTION INTRAMUSCULAR; INTRAVENOUS at 04:34

## 2023-12-14 RX ADMIN — POTASSIUM CHLORIDE 20 MEQ: 29.8 INJECTION, SOLUTION INTRAVENOUS at 10:37

## 2023-12-14 RX ADMIN — PIPERACILLIN AND TAZOBACTAM 3.38 G: 3; .375 INJECTION, POWDER, LYOPHILIZED, FOR SOLUTION INTRAVENOUS at 20:10

## 2023-12-14 RX ADMIN — LEVOTHYROXINE SODIUM ANHYDROUS 70 MCG: 100 INJECTION, POWDER, LYOPHILIZED, FOR SOLUTION INTRAVENOUS at 10:37

## 2023-12-14 RX ADMIN — PANTOPRAZOLE SODIUM 40 MG: 40 INJECTION, POWDER, FOR SOLUTION INTRAVENOUS at 20:03

## 2023-12-14 RX ADMIN — METOPROLOL TARTRATE 5 MG: 5 INJECTION INTRAVENOUS at 22:02

## 2023-12-14 RX ADMIN — HEPARIN SODIUM 2000 UNITS/HR: 10000 INJECTION, SOLUTION INTRAVENOUS at 03:06

## 2023-12-14 RX ADMIN — METRONIDAZOLE 500 MG: 500 INJECTION, SOLUTION INTRAVENOUS at 21:13

## 2023-12-14 RX ADMIN — VANCOMYCIN HYDROCHLORIDE 125 MG: 125 CAPSULE ORAL at 20:01

## 2023-12-14 RX ADMIN — METOPROLOL TARTRATE 5 MG: 5 INJECTION INTRAVENOUS at 04:34

## 2023-12-14 RX ADMIN — PROCHLORPERAZINE EDISYLATE 5 MG: 5 INJECTION INTRAMUSCULAR; INTRAVENOUS at 19:58

## 2023-12-14 RX ADMIN — METOPROLOL TARTRATE 5 MG: 5 INJECTION INTRAVENOUS at 10:36

## 2023-12-14 RX ADMIN — FLUCONAZOLE IN SODIUM CHLORIDE 400 MG: 2 INJECTION, SOLUTION INTRAVENOUS at 12:01

## 2023-12-14 RX ADMIN — TACROLIMUS 0.5 MG: 0.5 CAPSULE ORAL at 18:06

## 2023-12-14 RX ADMIN — TACROLIMUS 0.5 MG: 0.5 CAPSULE ORAL at 08:42

## 2023-12-14 ASSESSMENT — ACTIVITIES OF DAILY LIVING (ADL)
DEPENDENT_IADLS:: CLEANING;COOKING;LAUNDRY;SHOPPING;MEAL PREPARATION;MEDICATION MANAGEMENT;TRANSPORTATION
ADLS_ACUITY_SCORE: 30
ADLS_ACUITY_SCORE: 34
ADLS_ACUITY_SCORE: 30

## 2023-12-14 NOTE — PROGRESS NOTES
Goal Outcome Evaluation:       Plan of Care Reviewed With: patient     Overall Patient Progress: improving  /87 (BP Location: Left arm)   Pulse 84   Temp 98.3  F (36.8  C) (Oral)   Resp 18   Wt 110.1 kg (242 lb 11.6 oz)   SpO2 96%   BMI 38.02 kg/m       Shift: [id number]  Isolation Status: contact precautions maintained  VS: hypertensive 145/99. Stable on RA, afebrile  Neuro: Aox4  Behaviors: Calm and cooperative  BG: Q4, 121, 145, 146  Labs/Imaging: Pending AM lab results  Respiratory: infrequent, productive cough. Diminished breath sounds in bases.  Cardiac: irregular apical pulse   Pain/Nausea: intermittent nausea  PRN: zofran x2  Diet: NPO except ice chips. TPN at 45mL/hr  IV Access: R external jugular, L PIV  Infusion(s): Heparin at 2000 units/hr, cellcept completed x2, zosyn completed x2.  Lines/Drains: MAE drain 1: 30mL, MAE drain 2: 30mL, MAE drain 3: 5  GI/: 1 small BM 12/13, 550 urine output.  Skin: umbilicus surgical site covered with post op dressing, UTV  Mobility: up with gait belt and walker pivot to commode/chair  Plan: continue with plan of care, will notify MD with any changes.

## 2023-12-14 NOTE — PROGRESS NOTES
[facility]   Transplant Nephrology Progress Note  Date of Admission:  12/8/2023  Today's Date: 12/14/2023    Recommendations:  - No acute indications for dialysis.  - Recommend  holding diuresis today with ongoing diarrhea.  - Agree with checking for C. diff.  - If left knee pain worsens and concern for gout, could consider steroid burst equivalent to prednisone 40 mg daily x 5 days.  - Would consider 2-3 doses of iron ferrlecit 200 mg IV daily for anemia with mild iron deficiency when out of infection risk.    Assessment & Plan   # DDKT (SLK): Trend down in creatinine, now back to baseline.  Very good urine output.  No acute indications for dialysis.   - PATI felt secondary to hypotension and sepsis with probable ATN.    - Baseline Creatinine: ~ 0.8-1.0   - Proteinuria: Normal (<0.2 grams)   - Date DSA Last Checked: Aug/2023      Latest DSA: No   - BK Viremia: No   - Kidney Tx Biopsy: Jun 20, 2023; Result: No diagnostic evidence of acute rejection.   Focal acute tubular necrosis.  Mild arterial and arteriolar sclerosis.    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  Transaminases Stable.  Followed by Transplant Surgery.     # Immunosuppression Prior to Admission: Tacrolimus immediate release (goal 6-8), Mycophenolic acid (dose 540 mg every 12 hours), and Prednisone (dose 5 mg daily)   - Present Immunosuppression: Tacrolimus immediate release (goal 6-8), Mycophenolate mofetil (dose 500 mg every 12 hours), and Methylprednisolone (dose 4 mg daily)   - Patient is in an immunosuppressed state and will continue to monitor for efficacy and toxicity of immunosuppression medications.   - Changes: Not at this time     # Infection Prophylaxis:   - PJP: Sulfa/TMP (Bactrim) - On hold with NPO  - CMV: None, prophylaxis completed; CMV IgG Ab positive  - Fungal: Fluconazole (Diflucan)    # Hypertension:  Borderline control;  Goal BP: < 140/90 (Hospitalization goal)   - Volume status: Mildly hypervolemic  EDW ~ 225 lbs   - Changes: Yes - Would hold furosemide for now with ongoing fluid loss from diarrhea.    # Elevated Blood Glucose: Glucose generally running ~ 120-160s   - On insulin.   - Management as per primary team.    # Anemia in Chronic Renal Disease: Hgb: Stable      FEDERICO: No   - Iron studies: Low iron saturation; Would consider course of IV iron when patient is out of infection risk concern.    # Mineral Bone Disorder:   - Secondary renal hyperparathyroidism; PTH level: Normal (15-65 pg/ml)        On treatment: None  - Vitamin D; level: Normal        On supplement: No  - Calcium; level: Low normal        On supplement: No  - Phosphorus; level: Normal        On supplement: No    # Electrolytes:   - Potassium; level: Normal        On supplement: No  - Magnesium; level: Normal        On supplement: No  - Bicarbonate; level: Stable low        On supplement: No    # Possible Pneumonia: Patient with LLL atelectasis and small right pleural effusion on CXR.  He has a productive cough, but negative cultures.  On broad-spectrum antibiotics for abdominal infection.  Oxygenation is okay.    # Umbilical Hernia Mesh Repair: Patient is s/p scheduled redo mesh repair of umbilical hernia and minimal DHEERAJ of small bowel 12/7/23. Patient then represented 12/8/23 from home with severe LLQ abdominal pain and N/V 12/8/23.  Back to OR 12/10 and found to have bowel leak and repaired.  On broad spectrum-antibiotics and antifungal.    # CAD, s/p PCI: Asymptomatic.     # Paroxysmal A-Fib with Episodic RVR: Patient had episode of unresponsiveness 12/8/23- possible seizure vs apnea. Extensive workup with evidence of A fib w RVR, s/p PO dose of metoprolol.  Received Digoxin 500 mcg given IV once and subsequent dose of 250mcg on 12/9.  On apixaban PTA.  A. fib felt to be driven by septic shock. Started on amiodarone gtt on 12/9.  Presently  in A. fib, but rate controlled.  Anticoagulation restarted on heparin gtt.    # Obesity, Class II (BMI = 39.7): Trend up in weight, likely some volume related following surgery.   - Once patient heals and recovers from surgery, would recommend working on weight loss for overall health by increasing exercise and watching caloric intake.     # Peripheral Neuropathy: Had been stable on gabapentin, but presently on hold due to recent hypotension.     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  Previously treated with rituximab x 2.     # Hx of Gout: PTA on prednisone 5mg daily and allopurinol 300mg daily.  Also takes anakinra 100mg with gout symptoms.  Allopurinol presently on hold due to NPO.   - If left knee pain worsens and concern for gout, could consider steroid burst equivalent to prednisone 40 mg daily x 5 days.    # Transplant History:  Etiology of Kidney Failure: IgA nephropathy and ANCA vasculitis  Tx: DDKT (SLK) and Liver Tx (K)  Transplant: 6/6/2023 (Liver), 6/6/2023 (Kidney)  Significant changes in immunosuppression: None  Significant transplant-related complications: CMV Viremia and DGF    Recommendations were communicated to the primary team via this note.    Jeovany Scott MD  Transplant Nephrology  Contact information available via [facility] Paging/Directory    Interval History   Mr. Quinones's creatinine is 1.02 (12/14 0504); Trend down.  Very good urine output.  Normal transaminases.  Other significant labs/tests/vitals: Stable electrolytes.  Trend up in hemoglobin.  No new events overnight.  No chest pain or shortness of breath.  Some leg swelling and puffiness still.  No nausea and vomiting.  Bowel movements are watery to loose and frequent.  No fever, sweats or chills.    Review of Systems   4 point ROS was obtained and negative except as noted in the Interval History.    MEDICATIONS:   [Held by provider] allopurinol  300 mg Oral or Feeding Tube Daily    [Held by provider] atorvastatin  40 mg  Oral or Feeding Tube QPM    fluconazole  400 mg Intravenous Q24H    [Held by provider] gabapentin  100 mg Oral or Feeding Tube BID    [Held by provider] gabapentin  200 mg Oral or Feeding Tube Daily    insulin aspart  1-12 Units Subcutaneous Q4H    levothyroxine  70 mcg Intravenous Daily    [Held by provider] levothyroxine  88 mcg Oral or Feeding Tube Daily    lipids plant base  250 mL Intravenous Once per day on     methylPREDNISolone  4 mg Intravenous Daily    metoprolol  5 mg Intravenous Q6H    [Held by provider] metoprolol tartrate  50 mg Oral BID    [Held by provider] mycophenolate  750 mg Oral or NG Tube BID IS    mycophenolate mofetil  500 mg Intravenous BID    pantoprazole  40 mg Intravenous BID    piperacillin-tazobactam  3.375 g Intravenous Q6H    potassium chloride  20 mEq Intravenous Once    [Held by provider] predniSONE  5 mg Oral or Feeding Tube Daily    [Held by provider] senna-docusate  2 tablet Oral or Feeding Tube BID    sodium chloride (PF)  10-40 mL Intracatheter Q8H    [Held by provider] sodium chloride (PF)  3 mL Intracatheter Q8H    [Held by provider] sulfamethoxazole-trimethoprim  1 tablet Oral or Feeding Tube Daily    tacrolimus  0.5 mg Oral BID IS      dextrose      heparin 2,150 Units/hr (23 0702)    parenteral nutrition - ADULT compounded formula 45 mL/hr at 23       Physical Exam   Temp  Av.1  F (36.7  C)  Min: 96.7  F (35.9  C)  Max: 100.1  F (37.8  C)  Arterial Line BP  Min: 70/55  Max: 158/122  Arterial Line MAP (mmHg)  Av.2 mmHg  Min: 61 mmHg  Max: 207 mmHg      Pulse  Av  Min: 60  Max: 147 Resp  Av  Min: 9  Max: 36  FiO2 (%)  Av.9 %  Min: 40 %  Max: 50 %  SpO2  Av.9 %  Min: 85 %  Max: 100 %     BP (!) 145/91 (BP Location: Left arm)   Pulse 92   Temp 98.2  F (36.8  C) (Oral)   Resp 18   Wt 110.1 kg (242 lb 11.6 oz)   SpO2 96%   BMI 38.02 kg/m     Date 23 07 - 23 0659    Shift 2962-9004 0609-5644 3364-4224 24 Hour Total   INTAKE   I.V. 545.28   545.28      160   Shift Total(mL/kg) 705.28(6.33)   705.28(6.33)   OUTPUT   Urine 225   225   Shift Total(mL/kg) 225(2.02)   225(2.02)   Weight (kg) 111.5 111.5 111.5 111.5      Admit Weight: 111.5 kg (245 lb 13 oz)     GENERAL APPEARANCE: alert and no distress  HENT: mouth without ulcers or lesions, NG in place  RESP: lungs clear to auscultation from anterior - no rales, rhonchi or wheezes  CV: irregular rhythm and rate, no rub, no murmur  EDEMA: 1+ LE edema bilaterally  ABDOMEN: soft, nondistended, mild TTP, bowel sounds normal  MS: extremities normal - no gross deformities noted, no evidence of inflammation in joints, no muscle tenderness  SKIN: no rash  TX KIDNEY: normal  DIALYSIS ACCESS:  None    Data   All labs reviewed by me.  CMP  Recent Labs   Lab 12/14/23  0838 12/14/23  0504 12/14/23  0447 12/14/23  0049 12/13/23  0856 12/13/23  0359 12/12/23  0358 12/12/23  0355 12/11/23  0512 12/11/23  0327   NA  --  141  --   --   --  142  --  141  --  138   POTASSIUM  --  3.7  --   --   --  3.6  --  3.8  --  4.0   CHLORIDE  --  105  --   --   --  106  --  108*  --  106   CO2  --  21*  --   --   --  26  --  21*  --  22   ANIONGAP  --  15  --   --   --  10  --  12  --  10   * 169* 146* 145*   < > 196*   < > 170*   < > 214*   BUN  --  42.2*  --   --   --  42.3*  --  40.1*  --  35.7*   CR  --  1.02  --   --   --  1.19*  --  1.42*  --  1.83*   GFRESTIMATED  --  81  --   --   --  67  --  54*  --  40*   NAZARIO  --  8.4*  --   --   --  8.1*  --  8.0*  --  7.4*   MAG  --  1.8  --   --   --  2.1  --  2.3  --  2.1   PHOS  --  2.5  --   --   --  2.5  --  2.8  --  2.4*   PROTTOTAL  --  5.5*  --   --   --  5.3*  --  5.1*  --  4.3*   ALBUMIN  --  2.8*  --   --   --  3.0*  --  2.8*  --  2.7*   BILITOTAL  --  0.8  --   --   --  0.7  --  0.6  --  1.1   ALKPHOS  --  106  --   --   --  90  --  70  --  59   AST  --  36  --   --   --  26  --  24  --  24    ALT  --  23  --   --   --  19  --  11  --  9    < > = values in this interval not displayed.     CBC  Recent Labs   Lab 12/14/23  0504 12/13/23  0359 12/12/23  0355 12/11/23  0327   HGB 12.0* 10.4* 10.2* 10.2*   WBC 8.2 9.1 9.2 5.5   RBC 4.16* 3.63* 3.56* 3.52*   HCT 39.0* 33.8* 33.2* 33.5*   MCV 94 93 93 95   MCH 28.8 28.7 28.7 29.0   MCHC 30.8* 30.8* 30.7* 30.4*   RDW 17.2* 17.4* 17.3* 17.3*   * 110* 123* 124*     INR  Recent Labs   Lab 12/11/23  0327 12/10/23  1408   INR 1.92* 2.29*   PTT 40* 40*     ABG  Recent Labs   Lab 12/11/23  0826 12/10/23  2024 12/10/23  1731 12/10/23  1408   PH 7.42 7.36 7.12* 7.35   PCO2 36 39 65* 38   PO2 63* 90 88 87   HCO3 23 22 21 21   O2PER 40 40 50 50      Urine Studies  Recent Labs   Lab Test 12/09/23  1405 11/07/23  0753 09/27/23  1157 09/25/23  0759 09/20/23  1645 08/29/23  1023 08/04/23  0715   COLOR Yellow Yellow Dark Yellow* Yellow Yellow   < > Yellow   APPEARANCE Clear Clear Clear Clear Clear   < > Clear   URINEGLC Negative Negative 50* Negative 100*   < > Negative   URINEBILI Negative Small* Small* Small* Negative   < > Negative   URINEKETONE Negative Negative Negative Negative Negative   < > Negative   SG 1.022 1.015 1.029 1.020 1.015   < > 1.010   UBLD Negative Negative Negative Negative Negative   < > Negative   URINEPH 5.0 7.0 6.0 7.0 7.0   < > 6.5   PROTEIN 10* Trace* 70* 30* Negative   < > Negative   UROBILINOGEN  --  0.2  --  0.2 0.2  --  0.2   NITRITE Negative Negative Negative Negative Negative   < > Negative   LEUKEST Negative Trace* Negative Negative Negative   < > Small*   RBCU 2 0-2 1 None Seen  --   --  0-2   WBCU 6* 10-25* 7* None Seen  --   --  0-5    < > = values in this interval not displayed.     No lab results found.  PTH  Recent Labs   Lab Test 09/20/23  1611 08/01/23  0924 05/19/23  0956   PTHI 24 45 67*     Iron Studies  Recent Labs   Lab Test 11/21/23  0742 11/07/23  0753 10/05/23  0752 09/05/23  0702 08/07/23  0726 08/01/23  0924  11/22/22  0848   IRON 60* 53* 83 32* 28* 23* 68    226* 241 258 244 231* 219*   IRONSAT 22 23 34 12* 11* 10* 31   HOLA 204 341 919* 485* 440* 506* 429*       IMAGING:  All imaging studies reviewed by me.

## 2023-12-14 NOTE — PLAN OF CARE
Goal Outcome Evaluation:  /85 (BP Location: Left arm)   Pulse 75   Temp 98.5  F (36.9  C) (Oral)   Resp 20   Wt 110.1 kg (242 lb 11.6 oz)   SpO2 96%   BMI 38.02 kg/m      Shift: Arrival on unit at 1200 to 1930  Isolation Status: Contact, cytotoxic  VS: hypertensive on RA, afebrile  Neuro: Aox4  Behaviors: pleasant, cooperative with cares, able to make his needs known  BG: q4h 203, 210, treated with sliding scale  Respiratory: Clear lung sounds, diminished bases and RMQ  Cardiac: Tele reads a-fib  Pain/Nausea: Denied nausea, pain only on stripping of MAE 2  PRN: NA  Diet: NPO  IV Access: LUE PIV, double lumen R external jugular  Infusion(s): TPN currently at 40 mL/hour, Heparin currently at 2000 units/hour per RN managed protocol, fluconazole daily, lipids M-Sat, Cellcept, Zosyn q6h  Lines/Drains: MAE x3 all with <30 mL serosanguinous output  GI/: one medium soft/loose BM.  Voiding without difficulty  Skin: Scattered bruises, skin tear x2 RUE, healed clamshell incision, midline incision  Mobility: Assist of 1 with walker and gait belt

## 2023-12-14 NOTE — PLAN OF CARE
Goal Outcome Evaluation:      Plan of Care Reviewed With: patient    Overall Patient Progress: improving  /87 (BP Location: Left arm)   Pulse 84   Temp 98.3  F (36.8  C) (Oral)   Resp 18   Wt 110.1 kg (242 lb 11.6 oz)   SpO2 96%   BMI 38.02 kg/m      Shift: 3553-7582  Isolation Status: contact precautions maintained  VS: hypertensive 145/99. Stable on RA, afebrile  Neuro: Aox4  Behaviors: Calm and cooperative  BG: Q4, 121, 145, 146  Labs/Imaging: Pending AM lab results  Respiratory: infrequent, productive cough. Diminished breath sounds in bases.  Cardiac: irregular apical pulse   Pain/Nausea: intermittent nausea  PRN: zofran x2  Diet: NPO except ice chips. TPN at 45mL/hr  IV Access: R external jugular, L PIV  Infusion(s): Heparin at 2000 units/hr, cellcept completed x2, zosyn completed x2.  Lines/Drains: MAE drain 1: 30mL, MAE drain 2: 30mL, MAE drain 3: 5  GI/: 1 small BM 12/13, 550 urine output.  Skin: umbilicus surgical site covered with post op dressing, UTV  Mobility: up with gait belt and walker pivot to commode/chair  Plan: continue with plan of care, will notify MD with any changes.

## 2023-12-14 NOTE — PLAN OF CARE
BP (!) 134/91   Pulse 92   Temp 98.9  F (37.2  C) (Axillary)   Resp 16   Wt 110.1 kg (242 lb 11.6 oz)   SpO2 96%   BMI 38.02 kg/m         Vitals: hypertensive on RA, afebrile  Neuro: A&O x4, able to make his needs known  BG: q4h 158, 156, insulin give per sliding scale  Pain/Nausea: Denied nausea, pain only on stripping of MAE 2, no PRN given  Diet: NPO  IV Access: LUE PIV, double lumen R external jugular  Infusion(s): TPN currently at 45 mL/hour, Heparin currently at 2150 units/hour, Zosyn q6h  Lines/Drains: MAE x3 all with <30 mL serosanguinous output  GI/: one medium soft/loose BM.  Voiding via urinal without difficulty  Skin: Scattered bruises, skin tear x2 RUE, healed clamshell incision, midline incision  Mobility: Assist of 1 with walker and gait belt  Plan: Monitor and cont with POC

## 2023-12-14 NOTE — PROGRESS NOTES
Transplant Surgery  Inpatient Daily Progress Note  12/14/2023    Assessment & Plan: Damion Quinones is a 68 year old male with a history of PAF, obesity, HTN, pre-diabetes, rheumatoid and psoriatic arthritis, CAD, and cirrhosis (alcohol + hemochromatosis) and 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 He underwent a redo mesh repair of umbilical hernia and minimal DHEERAJ of small bowel 12/7/23 with Dr. Clifton. Presented from home on POD1 after LLQ pain. Now s/p RTOR on 12/10 with repaired enterotomy and abdominal wash out.    s/p 12/7/23 Redo mesh repair of umbilical hernia and minimal DHEERAJ of small bowel; RTOR 12/10/23 Ex lap, repair of SB perforation, abdominal wash out:   - NPO with TPN    - Upper GI 12/15   - Zosyn/diflucan   - Follow intra-op cultures, + yeast (saccharomyces cerevisiae)     Hx SLK 6/6/23:  Liver: LFTs WNL.   Kidney; PATI secondary to sepsis/SIRS: Cr now back to baseline ~0.8-1. Good UOP.    - Appreciate Transplant nephrology recs.      Immunosuppressed status secondary to medications:   Maintenance:    - PTA on Myfortic 540 mg BID -> now to  mg IV   - Tacrolimus 1.5 mg BID -> SL Tac 0.5 mg BID, now transitioned back to PO. Goal level 6-8. Monitor daily with changes.    - PTA Prednisone 5 mg on hold due to NPO -> 4 mg Methylpred IV daily.      Neuro:  Peripheral neuropathy: Follows with Neurology. PTA gabapentin, capsaicin cream. Holding orals  Acute post-op pain: LLQ abdominal pain.   - PRN IV Dilaudid while NPO (minimal use).      Hematology:   Acute blood loss anemia: Hgb 10-12 post-operatively (previously WNL). Monitor.  Chronic anticoagulation for Afib and DVT/PE ppx: PTA apixaban. Held starting 12/3 prior to procedure, not restarted post procedure.    - Continue low intensity heparin gtt with plan to transition to high intensity on 12/15.   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     Cardiorespiratory:   Hx CAD: PTA atorvastatin on hold.  PAF with RVR and hypotension: PTA metoprolol 50 mg BID and PTA apixaban on HOLD for now. Cardiology consulted earlier this admission due to uncontrolled afib. Received digoxin x2 then amiodorone gtt. IV metoprolol initiated and amiodarone off.    - Continue IV metoprolol while NPO.   Hypotension; Tachycardia: LA normalized. Likely 2/2 abdominal sepsis. Resolved.  Respiratory insufficiency: Secondary to pulmonary edema. Extubated 12/11 without difficulty. Now breathing comfortably on RA. Resolved.      GI/Nutrition:   At risk for malnutrition: Nutrition consulted. Continue TPN while NPO. Await UGI on 12/15. NGT to LIS.   Diarrhea: BM x 7 overnight. Send C diff.      Endocrine:   Hypothyroidism: PTA levothyroxine -> IV while NPO.  Steroid/stress induced hyperglycemia: Continue sliding scale insulin.     Fluid/Electrolytes:  Hypomagnesemia: PTA Mag-Ox 800 mg BID on hold.     : See kidney graft function.      Infectious disease:   Intradbominal sepsis: Improved since OR. Lactate improved. Cultures +yeast (Saccharomyces cerevisiae). Afebrile. No leukocytosis.    - Continue fluconazole, Zosyn.      Rheum:  Hx Gout: Follows with Rheumatology. PTA allopurinol, prednisone, PRN anakinra on hold.      Prophylaxis: DVT (hep gtt, restart apixiban when able), fall, GI (PPI), pneumocystis (Bactrim on hold)     Disposition: 7A    GEORGE/Fellow/Resident Provider: Lizbeth Greer NP [id number]    Faculty: Dr. Dao BELLO PhD  __________________________________________________________________  Transplant History: Admitted 12/8/2023 for abdominal pain.   6/6/2023 (Liver), 6/6/2023 (Kidney), Postoperative day: 191 (Liver), 191 (Kidney)     Interval History: History is obtained from the patient, spouse  Overnight events: Diarrhea started overnight. Stools frequent, loose-watery. Also feels as though left knee is starting to have a gout flare.      ROS:   A 10-point review of systems was negative except as noted above.    Curent Meds:   [Held by provider] allopurinol  300 mg Oral or Feeding Tube Daily    [Held by provider] atorvastatin  40 mg Oral or Feeding Tube QPM    fluconazole  400 mg Intravenous Q24H    [Held by provider] gabapentin  100 mg Oral or Feeding Tube BID    [Held by provider] gabapentin  200 mg Oral or Feeding Tube Daily    insulin aspart  1-12 Units Subcutaneous Q4H    levothyroxine  70 mcg Intravenous Daily    [Held by provider] levothyroxine  88 mcg Oral or Feeding Tube Daily    lipids plant base  250 mL Intravenous Once per day on Monday Tuesday Wednesday Thursday Friday Saturday    methylPREDNISolone  4 mg Intravenous Daily    metoprolol  5 mg Intravenous Q6H    [Held by provider] metoprolol tartrate  50 mg Oral BID    [Held by provider] mycophenolate  750 mg Oral or NG Tube BID IS    mycophenolate mofetil  500 mg Intravenous BID    pantoprazole  40 mg Intravenous BID    piperacillin-tazobactam  3.375 g Intravenous Q6H    sodium phosphate  9 mmol Intravenous Once    [Held by provider] predniSONE  5 mg Oral or Feeding Tube Daily    [Held by provider] senna-docusate  2 tablet Oral or Feeding Tube BID    sodium chloride (PF)  10-40 mL Intracatheter Q8H    [Held by provider] sodium chloride (PF)  3 mL Intracatheter Q8H    [Held by provider] sulfamethoxazole-trimethoprim  1 tablet Oral or Feeding Tube Daily    tacrolimus  0.5 mg Oral BID IS       Physical Exam:     Current Vitals:   BP (!) 145/91 (BP Location: Left arm)   Pulse 92   Temp 98.2  F (36.8  C) (Oral)   Resp 18   Wt 110.1 kg (242 lb 11.6 oz)   SpO2 96%   BMI 38.02 kg/m      Vital sign ranges:    Temp:  [98.2  F (36.8  C)-98.6  F (37  C)] 98.2  F (36.8  C)  Pulse:  [62-92] 92  Resp:  [16-20] 18  BP: (125-157)/() 145/91  SpO2:  [95 %-98 %] 96 %  Patient Vitals for the past 24 hrs:   BP Temp Temp src Pulse Resp SpO2   12/14/23 1015 (!) 145/91 98.2  F (36.8  C) Oral 92  18 96 %   12/14/23 0540 (!) 130/96 98.4  F (36.9  C) Oral 76 18 97 %   12/14/23 0157 138/87 98.3  F (36.8  C) Oral 84 18 96 %   12/13/23 2229 (!) (P) 145/99 (P) 98.5  F (36.9  C) (P) Oral (P) 68 (P) 18 (P) 95 %   12/13/23 2110 (!) 145/107 -- -- -- -- --   12/13/23 1818 125/85 98.5  F (36.9  C) Oral 75 20 96 %   12/13/23 1552 (!) 145/105 -- -- 73 -- --   12/13/23 1355 (!) 157/89 98.2  F (36.8  C) Oral 75 16 95 %   12/13/23 1200 (!) 155/82 98.6  F (37  C) Oral 62 16 98 %       BP (!) 145/91 (BP Location: Left arm)   Pulse 92   Temp 98.2  F (36.8  C) (Oral)   Resp 18   Wt 110.1 kg (242 lb 11.6 oz)   SpO2 96%   BMI 38.02 kg/m      General Appearance: in no apparent distress  Respiratory: unlabored on RA  Cardiovascular: irregular; AF  GI: abdomen soft, appropriately tender over incision, mildly distended. No rebound or guarding. Incision closed with sutures and inferior aspect packed (scant serosanguinous drainage noted. Drains x3 with s/s output  Skin: warm, dry  Musculoskeletal: BLE pedal edema +1  Neurologic: A&OX4. No confusion  Psychiatric: calm, behavior appropriate to situation    Frailty Scores          12/27/2022 11/22/2022   Frailty Scores   Final Score Frail Frail   Final Score Number 3 4       Data:   CMP  Recent Labs   Lab 12/14/23  0838 12/14/23  0504 12/13/23  0856 12/13/23  0359 12/10/23  1415 12/10/23  1408 12/09/23  0159 12/08/23 2002   NA  --  141  --  142   < > 138   < > 139   POTASSIUM  --  3.7  --  3.6   < > 4.2   < > 4.2   CHLORIDE  --  105  --  106   < > 105   < >  --    CO2  --  21*  --  26   < > 18*   < >  --    * 169*   < > 196*   < > 131*   < > 165*   BUN  --  42.2*  --  42.3*   < > 30.7*   < >  --    CR  --  1.02  --  1.19*   < > 1.71*   < >  --    GFRESTIMATED  --  81  --  67   < > 44*   < >  --    NAZARIO  --  8.4*  --  8.1*   < > 7.4*   < >  --    ICAW  --   --   --   --   --  4.1*  --  4.9   MAG  --  1.8  --  2.1   < > 1.8   < >  --    PHOS  --  2.5  --  2.5   < > 3.5   < >  --     ALBUMIN  --  2.8*  --  3.0*   < > 2.9*   < >  --    BILITOTAL  --  0.8  --  0.7   < > 1.5*   < >  --    ALKPHOS  --  106  --  90   < > 59   < >  --    AST  --  36  --  26   < > 23   < >  --    ALT  --  23  --  19   < > 7   < >  --     < > = values in this interval not displayed.     CBC  Recent Labs   Lab 12/14/23  0504 12/13/23  0359   HGB 12.0* 10.4*   WBC 8.2 9.1   * 110*     COAGS  Recent Labs   Lab 12/11/23  0327 12/10/23  1408   INR 1.92* 2.29*   PTT 40* 40*      Urinalysis  Recent Labs   Lab Test 12/09/23  1405 11/07/23  0753   COLOR Yellow Yellow   APPEARANCE Clear Clear   URINEGLC Negative Negative   URINEBILI Negative Small*   URINEKETONE Negative Negative   SG 1.022 1.015   UBLD Negative Negative   URINEPH 5.0 7.0   PROTEIN 10* Trace*   NITRITE Negative Negative   LEUKEST Negative Trace*   RBCU 2 0-2   WBCU 6* 10-25*     Virology:  Hepatitis C Antibody   Date Value Ref Range Status   06/05/2023 Nonreactive Nonreactive Final     BK Virus DNA copies/mL   Date Value Ref Range Status   10/05/2023 Not Detected Not Detected copies/mL Final   09/05/2023 Not Detected Not Detected copies/mL Final   08/29/2023 Not Detected Not Detected copies/mL Final   08/21/2023 Not Detected Not Detected copies/mL Final   08/17/2023 Not Detected Not Detected copies/mL Final   08/01/2023 Not Detected Not Detected copies/mL Final   07/31/2023 Not Detected Not Detected copies/mL Final

## 2023-12-15 ENCOUNTER — APPOINTMENT (OUTPATIENT)
Dept: CT IMAGING | Facility: CLINIC | Age: 68
End: 2023-12-15
Attending: NURSE PRACTITIONER
Payer: COMMERCIAL

## 2023-12-15 ENCOUNTER — DOCUMENTATION ONLY (OUTPATIENT)
Dept: OTHER | Facility: CLINIC | Age: 68
End: 2023-12-15
Payer: MEDICARE

## 2023-12-15 ENCOUNTER — APPOINTMENT (OUTPATIENT)
Dept: GENERAL RADIOLOGY | Facility: CLINIC | Age: 68
End: 2023-12-15
Attending: NURSE PRACTITIONER
Payer: COMMERCIAL

## 2023-12-15 LAB
ALBUMIN SERPL BCG-MCNC: 2.4 G/DL (ref 3.5–5.2)
ALP SERPL-CCNC: 115 U/L (ref 40–150)
ALT SERPL W P-5'-P-CCNC: 36 U/L (ref 0–70)
ANION GAP SERPL CALCULATED.3IONS-SCNC: 11 MMOL/L (ref 7–15)
AST SERPL W P-5'-P-CCNC: 29 U/L (ref 0–45)
BILIRUB SERPL-MCNC: 1 MG/DL
BUN SERPL-MCNC: 36.8 MG/DL (ref 8–23)
CALCIUM SERPL-MCNC: 8 MG/DL (ref 8.8–10.2)
CHLORIDE SERPL-SCNC: 105 MMOL/L (ref 98–107)
CREAT SERPL-MCNC: 0.95 MG/DL (ref 0.67–1.17)
DEPRECATED HCO3 PLAS-SCNC: 24 MMOL/L (ref 22–29)
EGFRCR SERPLBLD CKD-EPI 2021: 88 ML/MIN/1.73M2
ERYTHROCYTE [DISTWIDTH] IN BLOOD BY AUTOMATED COUNT: 17.2 % (ref 10–15)
ERYTHROCYTE [DISTWIDTH] IN BLOOD BY AUTOMATED COUNT: 17.3 % (ref 10–15)
GLUCOSE BLDC GLUCOMTR-MCNC: 125 MG/DL (ref 70–99)
GLUCOSE BLDC GLUCOMTR-MCNC: 149 MG/DL (ref 70–99)
GLUCOSE BLDC GLUCOMTR-MCNC: 152 MG/DL (ref 70–99)
GLUCOSE BLDC GLUCOMTR-MCNC: 160 MG/DL (ref 70–99)
GLUCOSE BLDC GLUCOMTR-MCNC: 171 MG/DL (ref 70–99)
GLUCOSE BLDC GLUCOMTR-MCNC: 180 MG/DL (ref 70–99)
GLUCOSE BLDC GLUCOMTR-MCNC: 187 MG/DL (ref 70–99)
GLUCOSE BLDC GLUCOMTR-MCNC: 192 MG/DL (ref 70–99)
GLUCOSE SERPL-MCNC: 183 MG/DL (ref 70–99)
HCT VFR BLD AUTO: 35.3 % (ref 40–53)
HCT VFR BLD AUTO: 40 % (ref 40–53)
HGB BLD-MCNC: 10.9 G/DL (ref 13.3–17.7)
HGB BLD-MCNC: 12.1 G/DL (ref 13.3–17.7)
MAGNESIUM SERPL-MCNC: 2 MG/DL (ref 1.7–2.3)
MCH RBC QN AUTO: 28.8 PG (ref 26.5–33)
MCH RBC QN AUTO: 29.1 PG (ref 26.5–33)
MCHC RBC AUTO-ENTMCNC: 30.3 G/DL (ref 31.5–36.5)
MCHC RBC AUTO-ENTMCNC: 30.9 G/DL (ref 31.5–36.5)
MCV RBC AUTO: 93 FL (ref 78–100)
MCV RBC AUTO: 96 FL (ref 78–100)
PHOSPHATE SERPL-MCNC: 2.9 MG/DL (ref 2.5–4.5)
PLATELET # BLD AUTO: 104 10E3/UL (ref 150–450)
PLATELET # BLD AUTO: 109 10E3/UL (ref 150–450)
POTASSIUM SERPL-SCNC: 3.5 MMOL/L (ref 3.4–5.3)
PROT SERPL-MCNC: 4.7 G/DL (ref 6.4–8.3)
RBC # BLD AUTO: 3.79 10E6/UL (ref 4.4–5.9)
RBC # BLD AUTO: 4.16 10E6/UL (ref 4.4–5.9)
SODIUM SERPL-SCNC: 140 MMOL/L (ref 135–145)
TACROLIMUS BLD-MCNC: 9.1 UG/L (ref 5–15)
TME LAST DOSE: NORMAL H
TME LAST DOSE: NORMAL H
UFH PPP CHRO-ACNC: 0.1 IU/ML
UFH PPP CHRO-ACNC: 0.17 IU/ML
UFH PPP CHRO-ACNC: 0.3 IU/ML
UFH PPP CHRO-ACNC: 0.33 IU/ML
WBC # BLD AUTO: 15.4 10E3/UL (ref 4–11)
WBC # BLD AUTO: 16.8 10E3/UL (ref 4–11)

## 2023-12-15 PROCEDURE — 74177 CT ABD & PELVIS W/CONTRAST: CPT | Mod: 26 | Performed by: RADIOLOGY

## 2023-12-15 PROCEDURE — B4185 PARENTERAL SOL 10 GM LIPIDS: HCPCS | Mod: JZ | Performed by: SURGERY

## 2023-12-15 PROCEDURE — 36415 COLL VENOUS BLD VENIPUNCTURE: CPT | Performed by: NURSE PRACTITIONER

## 2023-12-15 PROCEDURE — 250N000011 HC RX IP 250 OP 636: Mod: JZ

## 2023-12-15 PROCEDURE — 250N000012 HC RX MED GY IP 250 OP 636 PS 637: Performed by: NURSE PRACTITIONER

## 2023-12-15 PROCEDURE — 85027 COMPLETE CBC AUTOMATED: CPT | Performed by: STUDENT IN AN ORGANIZED HEALTH CARE EDUCATION/TRAINING PROGRAM

## 2023-12-15 PROCEDURE — 36592 COLLECT BLOOD FROM PICC: CPT | Performed by: SURGERY

## 2023-12-15 PROCEDURE — G1010 CDSM STANSON: HCPCS

## 2023-12-15 PROCEDURE — 36592 COLLECT BLOOD FROM PICC: CPT | Performed by: NURSE PRACTITIONER

## 2023-12-15 PROCEDURE — 36591 DRAW BLOOD OFF VENOUS DEVICE: CPT | Performed by: SURGERY

## 2023-12-15 PROCEDURE — 36591 DRAW BLOOD OFF VENOUS DEVICE: CPT | Performed by: STUDENT IN AN ORGANIZED HEALTH CARE EDUCATION/TRAINING PROGRAM

## 2023-12-15 PROCEDURE — 80053 COMPREHEN METABOLIC PANEL: CPT | Performed by: STUDENT IN AN ORGANIZED HEALTH CARE EDUCATION/TRAINING PROGRAM

## 2023-12-15 PROCEDURE — 120N000011 HC R&B TRANSPLANT UMMC

## 2023-12-15 PROCEDURE — 250N000011 HC RX IP 250 OP 636: Performed by: NURSE PRACTITIONER

## 2023-12-15 PROCEDURE — 84100 ASSAY OF PHOSPHORUS: CPT | Performed by: NURSE PRACTITIONER

## 2023-12-15 PROCEDURE — 250N000009 HC RX 250: Performed by: NURSE PRACTITIONER

## 2023-12-15 PROCEDURE — 80197 ASSAY OF TACROLIMUS: CPT | Performed by: NURSE PRACTITIONER

## 2023-12-15 PROCEDURE — 74018 RADEX ABDOMEN 1 VIEW: CPT | Mod: 26 | Performed by: RADIOLOGY

## 2023-12-15 PROCEDURE — 250N000011 HC RX IP 250 OP 636

## 2023-12-15 PROCEDURE — 99207 PR NO BILLABLE SERVICE THIS VISIT: CPT | Performed by: TRANSPLANT SURGERY

## 2023-12-15 PROCEDURE — 99233 SBSQ HOSP IP/OBS HIGH 50: CPT | Mod: 24 | Performed by: INTERNAL MEDICINE

## 2023-12-15 PROCEDURE — 250N000009 HC RX 250: Performed by: SURGERY

## 2023-12-15 PROCEDURE — 258N000003 HC RX IP 258 OP 636

## 2023-12-15 PROCEDURE — C9113 INJ PANTOPRAZOLE SODIUM, VIA: HCPCS

## 2023-12-15 PROCEDURE — 250N000009 HC RX 250: Performed by: STUDENT IN AN ORGANIZED HEALTH CARE EDUCATION/TRAINING PROGRAM

## 2023-12-15 PROCEDURE — 85520 HEPARIN ASSAY: CPT | Performed by: SURGERY

## 2023-12-15 PROCEDURE — 250N000011 HC RX IP 250 OP 636: Mod: JZ | Performed by: SURGERY

## 2023-12-15 PROCEDURE — 85027 COMPLETE CBC AUTOMATED: CPT | Performed by: NURSE PRACTITIONER

## 2023-12-15 PROCEDURE — 250N000011 HC RX IP 250 OP 636: Performed by: STUDENT IN AN ORGANIZED HEALTH CARE EDUCATION/TRAINING PROGRAM

## 2023-12-15 PROCEDURE — G1010 CDSM STANSON: HCPCS | Mod: GC | Performed by: RADIOLOGY

## 2023-12-15 PROCEDURE — 258N000003 HC RX IP 258 OP 636: Performed by: STUDENT IN AN ORGANIZED HEALTH CARE EDUCATION/TRAINING PROGRAM

## 2023-12-15 PROCEDURE — 74018 RADEX ABDOMEN 1 VIEW: CPT

## 2023-12-15 PROCEDURE — 74177 CT ABD & PELVIS W/CONTRAST: CPT | Mod: MG

## 2023-12-15 PROCEDURE — 83735 ASSAY OF MAGNESIUM: CPT | Performed by: NURSE PRACTITIONER

## 2023-12-15 PROCEDURE — 250N000013 HC RX MED GY IP 250 OP 250 PS 637

## 2023-12-15 PROCEDURE — 85520 HEPARIN ASSAY: CPT | Performed by: NURSE PRACTITIONER

## 2023-12-15 PROCEDURE — 250N000011 HC RX IP 250 OP 636: Performed by: PHYSICIAN ASSISTANT

## 2023-12-15 RX ORDER — DEXTROSE MONOHYDRATE 50 MG/ML
INJECTION, SOLUTION INTRAVENOUS
Status: COMPLETED
Start: 2023-12-15 | End: 2023-12-15

## 2023-12-15 RX ORDER — HEPARIN SODIUM 10000 [USP'U]/100ML
0-5000 INJECTION, SOLUTION INTRAVENOUS CONTINUOUS
Status: DISCONTINUED | OUTPATIENT
Start: 2023-12-15 | End: 2023-12-20

## 2023-12-15 RX ORDER — IOPAMIDOL 755 MG/ML
135 INJECTION, SOLUTION INTRAVASCULAR ONCE
Status: COMPLETED | OUTPATIENT
Start: 2023-12-15 | End: 2023-12-15

## 2023-12-15 RX ADMIN — PANTOPRAZOLE SODIUM 40 MG: 40 INJECTION, POWDER, FOR SOLUTION INTRAVENOUS at 20:43

## 2023-12-15 RX ADMIN — DEXTROSE MONOHYDRATE 1000 ML: 50 INJECTION, SOLUTION INTRAVENOUS at 21:07

## 2023-12-15 RX ADMIN — METOPROLOL TARTRATE 5 MG: 5 INJECTION INTRAVENOUS at 18:20

## 2023-12-15 RX ADMIN — FLUCONAZOLE IN SODIUM CHLORIDE 400 MG: 2 INJECTION, SOLUTION INTRAVENOUS at 13:52

## 2023-12-15 RX ADMIN — PIPERACILLIN AND TAZOBACTAM 3.38 G: 3; .375 INJECTION, POWDER, LYOPHILIZED, FOR SOLUTION INTRAVENOUS at 12:30

## 2023-12-15 RX ADMIN — METRONIDAZOLE 500 MG: 500 INJECTION, SOLUTION INTRAVENOUS at 08:12

## 2023-12-15 RX ADMIN — ANAKINRA 100 MG: 100 INJECTION, SOLUTION SUBCUTANEOUS at 20:52

## 2023-12-15 RX ADMIN — MYCOPHENOLATE MOFETIL 500 MG: 500 INJECTION, POWDER, LYOPHILIZED, FOR SOLUTION INTRAVENOUS at 21:07

## 2023-12-15 RX ADMIN — VANCOMYCIN HYDROCHLORIDE 125 MG: 125 CAPSULE ORAL at 11:23

## 2023-12-15 RX ADMIN — METOPROLOL TARTRATE 5 MG: 5 INJECTION INTRAVENOUS at 04:07

## 2023-12-15 RX ADMIN — ONDANSETRON 4 MG: 2 INJECTION INTRAMUSCULAR; INTRAVENOUS at 20:43

## 2023-12-15 RX ADMIN — VANCOMYCIN HYDROCHLORIDE 125 MG: 125 CAPSULE ORAL at 20:43

## 2023-12-15 RX ADMIN — HEPARIN SODIUM 2300 UNITS/HR: 10000 INJECTION, SOLUTION INTRAVENOUS at 11:21

## 2023-12-15 RX ADMIN — VANCOMYCIN HYDROCHLORIDE 125 MG: 125 CAPSULE ORAL at 08:09

## 2023-12-15 RX ADMIN — TACROLIMUS 0.5 MG: 0.5 CAPSULE ORAL at 18:20

## 2023-12-15 RX ADMIN — METHYLPREDNISOLONE SODIUM SUCCINATE 4 MG: 40 INJECTION INTRAMUSCULAR; INTRAVENOUS at 08:04

## 2023-12-15 RX ADMIN — OLIVE OIL AND SOYBEAN OIL 250 ML: 16; 4 INJECTION, EMULSION INTRAVENOUS at 20:36

## 2023-12-15 RX ADMIN — PIPERACILLIN AND TAZOBACTAM 3.38 G: 3; .375 INJECTION, POWDER, LYOPHILIZED, FOR SOLUTION INTRAVENOUS at 18:20

## 2023-12-15 RX ADMIN — PIPERACILLIN AND TAZOBACTAM 3.38 G: 3; .375 INJECTION, POWDER, LYOPHILIZED, FOR SOLUTION INTRAVENOUS at 02:13

## 2023-12-15 RX ADMIN — METOPROLOL TARTRATE 5 MG: 5 INJECTION INTRAVENOUS at 12:36

## 2023-12-15 RX ADMIN — METRONIDAZOLE 500 MG: 500 INJECTION, SOLUTION INTRAVENOUS at 20:40

## 2023-12-15 RX ADMIN — POTASSIUM PHOSPHATE, MONOBASIC POTASSIUM PHOSPHATE, DIBASIC: 224; 236 INJECTION, SOLUTION, CONCENTRATE INTRAVENOUS at 20:36

## 2023-12-15 RX ADMIN — HEPARIN SODIUM 2150 UNITS/HR: 10000 INJECTION, SOLUTION INTRAVENOUS at 02:06

## 2023-12-15 RX ADMIN — PANTOPRAZOLE SODIUM 40 MG: 40 INJECTION, POWDER, FOR SOLUTION INTRAVENOUS at 08:07

## 2023-12-15 RX ADMIN — PROCHLORPERAZINE EDISYLATE 5 MG: 5 INJECTION INTRAMUSCULAR; INTRAVENOUS at 04:23

## 2023-12-15 RX ADMIN — VANCOMYCIN HYDROCHLORIDE 125 MG: 125 CAPSULE ORAL at 16:57

## 2023-12-15 RX ADMIN — IOPAMIDOL 135 ML: 755 INJECTION, SOLUTION INTRAVENOUS at 16:23

## 2023-12-15 RX ADMIN — ONDANSETRON 4 MG: 2 INJECTION INTRAMUSCULAR; INTRAVENOUS at 00:40

## 2023-12-15 RX ADMIN — MYCOPHENOLATE MOFETIL 500 MG: 500 INJECTION, POWDER, LYOPHILIZED, FOR SOLUTION INTRAVENOUS at 11:20

## 2023-12-15 RX ADMIN — ONDANSETRON 4 MG: 2 INJECTION INTRAMUSCULAR; INTRAVENOUS at 06:32

## 2023-12-15 RX ADMIN — LEVOTHYROXINE SODIUM ANHYDROUS 70 MCG: 100 INJECTION, POWDER, LYOPHILIZED, FOR SOLUTION INTRAVENOUS at 08:09

## 2023-12-15 RX ADMIN — TACROLIMUS 0.5 MG: 0.5 CAPSULE ORAL at 08:10

## 2023-12-15 ASSESSMENT — ACTIVITIES OF DAILY LIVING (ADL)
ADLS_ACUITY_SCORE: 30

## 2023-12-15 NOTE — CONSULTS
[facility]  Consult Note - Rheumatology  Date of Admission:  12/8/2023  Reason for Consult: Hx of gout, steroid treatment in setting of NPO s/p bowel perforation    Assessment & Plan      Damion Quinones is a 68 year old male with a history of PAF, obesity, HTN, pre-diabetes, rheumatoid and psoriatic arthritis, CAD, and cirrhosis (alcohol + hemochromatosis) and 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 He underwent a redo mesh repair of umbilical hernia and minimal DHEERAJ of small bowel 12/7/23 with Dr. Clifton. Presented from home on POD1 after LLQ pain. Now s/p RTOR on 12/10 with repaired enterotomy and abdominal wash out. Rheumatology has been consulted for left knee gout flare x 3 days.    #Gout  #H/o rheumatoid arthritis  #H/o psoriatic arthritis    Previously on prednisone 5mg daily, allopurinol 300mg daily, and anakinra 100mg with sx.     Sx are consistent with polyarticular gout flare. He has multiple risk factors including hospitalization, infection and recent surgery ( d/t fluid shifts). Given his neuropathy, hard to accurately assess whether his feet hurt, but visual presentation seems consistent with a gout attack.   He has previously used anakinra to control his gout flare at onset to prevented from further progression and has tolerated anakinra well.  We have discussed at length the need to control his current symptoms before getting worse, agents that could be used in the setting would be prednisone or anakinra where both have risks and benefits.  Steroids if used would probably need high doses for few days in order to control his symptoms which would complicate his postoperative course as well as current infection therefore we discussed using anakinra 1-2 times in order to kameron the attack knowing that anakinra also has risk of increasing infection, the patient and his wife acknowledged these facts and were in agreement to use  "anakinra.    - repeat CRP, ESR  - recommend  anakinra 100mg daily subq . The duration of anakinra will be dependant on clinical response as well as comorbid conditions     Patient was seen and discussed with the Attending Physician, Dr. Brewer .    Staff addendum    I performed the history and physical examination of the patient and discussed the management with the resident.I have reviewed the patients history as well as the available lab and imaging studies. I reviewed the resident s note and agree with the documented findings and plan of care     Yolie Brewer MD  Rheumatology attending    of Medicine, Division of Rheumatic and Autoimmune diseases              Clinically Significant Risk Factors              # Hypoalbuminemia: Lowest albumin = 2.4 g/dL at 12/15/2023  5:47 AM, will monitor as appropriate   # Thrombocytopenia: Lowest platelets = 104 in last 2 days, will monitor for bleeding   # Hypertension: Noted on problem list        # Obesity: Estimated body mass index is 38.02 kg/m  as calculated from the following:    Height as of 12/7/23: 1.702 m (5' 7\").    Weight as of this encounter: 110.1 kg (242 lb 11.6 oz).      # Financial/Environmental Concerns: none         Vincent Rodrigez MD  Hospitalist Service Internal Medicine resident   Securely message with [employer] (more info)  Text page via [facility] Paging/Directory   ______________________________________________________________________    Chief Complaint       History is obtained from the patient    History of Present Illness   Damion Quinones is a 68 year old male with a history of PAF, obesity, HTN, pre-diabetes, rheumatoid and psoriatic arthritis, CAD, and cirrhosis (alcohol + hemochromatosis) and 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 He underwent a redo mesh repair of umbilical hernia and minimal DHEERAJ of small bowel 12/7/23 with Dr. Clifton. Presented from home on POD1 after LLQ pain. Now s/p RTOR on " 12/10 with repaired enterotomy and abdominal wash out. Rheumatology has been consulted for left knee gout flare x 3 days.      He has history of tophacous gout treated with low-dose prednisone 5 mg, allopurinol as well as anakinra injections as needed with Flares.  His wife shares that his most recent gout flare was in September 2023 where he used anakinra for 3 days.  He nonetheless uses anakinra when he feels gout flare is taking place in order to stop it from becoming full-blown gout flare.  Last time he used anakinra was 2 weeks ago at onset of a suspected gout flare which terminated the attack before becoming full-blown gout flare.    Has been having right knee pain x 3 days. Unable to walk on it and tender to palpation.  More recently, his hands started feeling tender and a bit swollen as well.     Past Medical History    Past Medical History:   Diagnosis Date    Alcoholic cirrhosis of liver with ascites (H) 10/11/2019    ANCA-associated vasculitis (H) 2022    Antiplatelet or antithrombotic long-term use     C. difficile colitis     Coronary artery disease     [facility] 4/2023 - complete occlusion of RCA    ESRD (end stage renal disease) on dialysis (H)     Gout     HCC (hepatocellular carcinoma) (H) 09/05/2023    History of hemochromatosis 10/11/2019    Hypertension     IgA nephropathy     Obesity     PAF (paroxysmal atrial fibrillation) (H)     Pre-diabetes 2023    Psoriatic arthritis (H)     RA (rheumatoid arthritis) (H)     Status post kidney transplant 06/06/2023    Induction with thymoglobulin 4mg/kg, + DSA CW9    Status post liver transplantation (H) 06/06/2023       Past Surgical History   Past Surgical History:   Procedure Laterality Date    APPENDECTOMY      Removed at 18 Years Old     BENCH KIDNEY  06/06/2023    Procedure: Bench kidney;  Surgeon: Chad Clifton MD;  Location:  OR    BENCH LIVER  06/06/2023    Procedure: Bench liver;  Surgeon: Chad Clifton MD;  Location:  OR     COLONOSCOPY       at [facility]     CV CORONARY ANGIOGRAM N/A 2023    Procedure: Coronary Angiogram;  Surgeon: Pablo Araujo MD;  Location: [facility]    EXPLORE GROIN N/A 12/10/2023    Procedure: Umbilical wound exploration, incision and drainage of abcess, exploratory laparotomy, mesh excision, small bowel primary repair;  Surgeon: Chad Clifton MD;  Location: [facility] OR    EYE SURGERY Bilateral     Cataract    H STATISTIC PICC LINE INSERTION >5YR, FAILED Left 2023    Unable to advance catheter over the axillary area    H STATISTIC PICC LINE INSERTION >5YR, FAILED      HERNIA REPAIR      History of bilateral inguinal hernia repair: 10/28/2014. Open hernia repair: 10/2017. Abdominal wound exploration and debridement 2017    HERNIORRHAPHY UMBILICAL N/A 2023    Procedure: HERNIORRHAPHY, UMBILICAL, OPEN, WITH MESH;  Surgeon: Chad Clifton MD;  Location: [facility] OR    INSERT SHUNT PORTAL TRANSJUGULAR INTRAHEPTIC  2022    IR CVC NON TUNNEL LINE REMOVAL  2023    IR CVC NON TUNNEL PLACEMENT > 5 YRS  2023    IR CVC TUNNEL PLACEMENT > 5 YRS OF AGE  2023    IR CVC TUNNEL PLACEMENT > 5 YRS OF AGE  2023    IR PICC PLACEMENT > 5 YRS OF AGE  2023    IR RENAL BIOPSY RIGHT  2023    picc failed attempt Right 2023    PICC failed attempt Right arm unable to thread the catheter in.    RETURN LIVER TRANSPLANT N/A 2023    Procedure: Return liver transplant. Intra-operative ultrasound;  Surgeon: Chad Clifton MD;  Location: [facility] OR    TRANSPLANT KIDNEY RECIPIENT  DONOR N/A 2023    Procedure: Transplant kidney recipient  donor, ureteral stent placement;  Surgeon: Chad Clifton MD;  Location: [facility] OR    TRANSPLANT LIVER RECIPIENT  DONOR N/A 2023    Procedure: Transplant liver recipient  donor;  Surgeon: Chad Clifton MD;  Location: [facility] OR       Medications    I have reviewed this patient's current medications       Physical Exam   Vital Signs: Temp: 98.1  F (36.7  C) Temp src: Oral BP: 101/65 Pulse: 103   Resp: 16 SpO2: 93 % O2 Device: None (Room air)    Weight: 242 lbs 11.62 oz    Constitutional: awake, appears comfortable  HEENT: EOMI, atraumatic, normocephalic  Respiratory: on room air, breathing comfortable, auscultation limited to anterior fields  Cardiovascular: regular rate and rhyhtm  Musculoskeletal: swollen tender right wrist, several  MCPs, tender bilateral knee, slightly erythematous MTP bilaterally    Neurologic: A&O to self, place and time, grossly nonfocal          Medical Decision Making         Data     I have personally reviewed the following data over the past 24 hrs:    16.8 (H)  \   10.9 (L)   / 104 (L)     140 105 36.8 (H) /  152 (H)   3.5 24 0.95 \       ALT: 36 AST: 29 AP: 115 TBILI: 1.0   ALB: 2.4 (L) TOT PROTEIN: 4.7 (L) LIPASE: N/A

## 2023-12-15 NOTE — PROGRESS NOTES
DISCHARGE  Reason for Discharge: Patient has failed to schedule further appointments.    Equipment Issued: NA    Discharge Plan: Patient was traveling for a month and had said he would reach out on return if he wished to continue, but he never contacted physical therapy. Therapist was unable to discuss further discharge planning with the patient.    Referring Provider:  Lashay Rachel           10/05/23 0500   Appointment Info   Signing clinician's name / credentials Syd Grove PT and Cheryl Agrawal SPT   Visits Used 6   Medical Diagnosis Kidney transplant recipient  Liver transplant recipient (H)   PT Tx Diagnosis Impaired functional mobility, gait, balance, and endurance   Progress Note/Certification   Start of Care Date 07/28/23   Onset of illness/injury or Date of Surgery 06/06/23  (Date of surgery)   Therapy Frequency 1 timer per week to 1 time every other week   Predicted Duration 8 weeks   Certification date from 10/05/23   Certification date to 11/30/23   Progress Note Due Date 11/02/23   Progress Note Completed Date 10/05/23   Supervision   Student Supervision Direct supervision provided;Direct Patient Contact Provided;Therapy services provided with the co-signing licensed therapist guiding and directing the services, and providing the skilled judgement and assessment throughout the session   PT Goal 1   Goal Identifier LEFS - Short Term   Goal Description Casey will demonstrate significantly improved function as evidenced by an improved LEFS score of at least 17/80.   Goal Progress 39/80   Target Date 08/25/23   Date Met 08/31/23   PT Goal 2   Goal Identifier LEFS - Long Term   Goal Description Casey will demonstrate significantly improved function as evidenced by an improved LEFS score of at least 50/80.   Goal Progress 39/80  (10/5/23: 24/80)   Target Date 11/30/23   PT Goal 3   Goal Identifier Walking   Goal Description Casey will demonstrate improved tolerance to and safety and independence with  functional mobility as evidenced by his ability to walk for up to 20 minutes without need for assistive device.   Goal Progress 5 minutes with no AD   Target Date 11/30/23   PT Goal 4   Goal Identifier TUG   Goal Description Casey will demonstrate improved functional mobility and a decreased fall risk as evidenced by an improved (decreased) TUG time of less than 13 seconds.   Goal Progress 8.47 no AD   Target Date 10/06/23   Date Met 08/31/23   PT Goal 5   Goal Identifier 30 Second Sit-to-stand   Goal Description Casey will demonstrate improved lower extremity strength and increased tolerance to functional transfers as evidenced by an improved 30 second sit-to-stand score of at least 10 reps without upper extremity assist.   Goal Progress 10 reps with no UE assist (28s)   Target Date 10/06/23   Date Met 08/31/23   PT Goal 6   Goal Identifier 6 Minute Walk Test   Goal Description Casey will demonstrate improved functional capacity as evidenced by an improved 6 MWT distance of at least 1100 feet.   Goal Progress 557.3 feet (stopped at 2:41)   Target Date 11/30/23   PT Goal 7   Goal Identifier FGA   Goal Description Casey will demonstrate improved safety and independence with functional mobility and a decreased fall risk as evidenced by an improved FGA score of at least 18/30.   Goal Progress 23/30 (no assistive device)   Target Date 10/06/23   Date Met 10/05/23   Subjective Report   Subjective Report Casey reports a recent gout flair and C-diff infection that is being managed with antibiotics. He has been feeling better and stronger since his ED visit. He reports symptom improvement since his last visit and he is not using his walker in his visit today. Casey mentioned wanting to discuss further treatment options with neurology.   Objective Measures   Objective Measures Objective Measure 1;Objective Measure 2;Objective Measure 3;Objective Measure 4;Objective Measure 5;Objective Measure 6   Objective Measure 1   Objective Measure  TUG   Details 8.47 no AD   Objective Measure 2   Objective Measure 8/31/23: 30 Second Sit-to-stand   Details 10 reps with no UE assist (28 seconds)   Objective Measure 3   Objective Measure Gait   Details Initial evaluation: 108.7 feet with standard walker (notably winded afterward)   Objective Measure 4   Objective Measure FGA   Details 23/30 (no assistive device)   Objective Measure 5   Objective Measure 6 Minute Walk Test   Details 557.3 feet (stopped at 2:41)   Objective Measure 6   Objective Measure Stairs   Details Up to 16 stairs ascending and descending  SBA; gait belt used;  with bilateral upper extremity assist. modified independent with some   Therapeutic Procedure/Exercise   Therapeutic Procedures: strength, endurance, ROM, flexibillity minutes (88841) 2   Ther Proc 2 sit to stands   Ther Proc 2 - Details 1x10   Skilled Intervention Exercises to improve general strength and endurance   Patient Response/Progress Casey tolerated all exercises and well with no  increase in symptoms.   Physical Performance Test/measures   Physical Performance Test/Measurement, Minutes (59815) 39   Patient Response/Progress See objective measures.   Skilled Intervention FGA. 30 second sit-to-stand. TUG. 6 minute walk test. Stair assessment.   Education   Learner/Method Patient;Significant Other;Listening;Demonstration;Pictures/Video;No Barriers to Learning   Plan   Home program See PTRx.   Plan for next session Continue to progress general stength and endurance exercises. Continue balance and gait training as appropriate.   Comments   Comments Assessment: Casey reported a recent flair of gout and C-diff infection that is being medically managed. He has been feeling better with improved symptoms since his last visit and since his hospitalization. He demonstrates improved TUG, FGA, and 6MWT scores of 8.47s, 23/30, and 557.3 feet respectively. This is evidence of improved functional mobility, balance, and safety, and a decreased  "fall risk. He was also able to climb significantly more stairs today (16 stairs) than previously (8 steps) with railings on each side, demonstrating modified independence initiall, though with SBA for last few steps due to fatigue. This demonstrates improved leg strength and endurance and safety and independence with functional mobility. He did not use any assistive devices today. Casey mentioned wanting to discuss further treatment options with neurology regarding possible \"neuropathy\" in feet/lower legs. Patient will be traveling for the next month, but he will continue to benefit from physical therapy to progress toward his remaining goals and improve his function when he returns. His goals and plan of care have been updated accordingly.   Total Session Time   Timed Code Treatment Minutes 41   Total Treatment Time (sum of timed and untimed services) 41     "

## 2023-12-15 NOTE — PLAN OF CARE
Goal Outcome Evaluation:       /72 (BP Location: Left arm)   Pulse 107   Temp 98.5  F (36.9  C) (Oral)   Resp 16   Wt 110.1 kg (242 lb 11.6 oz)   SpO2 94%   BMI 38.02 kg/m      Pt is A&OX4, on RA denies SOB, VSS except for baseline Afib, nausea was treated with Zofran and compazine, pt is NPO with NJ set to low intermittent suction.Denies pain, voiding without difficulty and had 1 BM this shift. Pt has heparin running at 2150, redraw is schedule for this AM. Pt had no new health concerns at this time. CPC

## 2023-12-15 NOTE — PLAN OF CARE
Vitals: HTN within parameters on RA. On CCM for PAF.   Neuros: AOx4. Denies N/T this shift, has neuropathy at baseline   IV: PIV infusing D5 as tko with cellcept. DL R jugular CVC - with TPN + lipds in one line, and heparin gtt in other lumen.   Labs/Electrolytes: Hep Xa  redraw in AM, last draw @2100 = 0.33, within range.   Resp/trach: LSC and diminished. Denies SOB.   Diet: NPO. Ice chips ok. NG tube on low intermittent suction. IV compazine given for nausea.   Bowel status: LBM 12/14. Cdiff positive in 12/14  : Voiding spontaneously via bedside urinal   Skin: JPx3 in abdomen.   Pain: Denies   Activity: A1 + GB and walker.   Plan: Cont POC.            Overall Patient Progress: improvingOverall Patient Progress: improving    Outcome Evaluation: VSS on RA. Pt has no pain. MAE drains x2.

## 2023-12-15 NOTE — CONSULTS
Care Management Initial Consult    General Information  Assessment completed with: Patient, Spouse or significant other, Nabila Quinones, spouse  Type of CM/SW Visit: Initial Assessment    Primary Care Provider verified and updated as needed: No   Readmission within the last 30 days: other (see comments) (Post-op)   Return Category: Post-op/Post-procedure complication  Reason for Consult: discharge planning  Advance Care Planning: Advance Care Planning Reviewed: concerns discussed          Communication Assessment  Patient's communication style: spoken language (English or Bilingual)    Hearing Difficulty or Deaf: no   Wear Glasses or Blind: yes    Cognitive  Cognitive/Neuro/Behavioral: WDL  Level of Consciousness: alert; somnolent    Living Environment:   People in home: spouse  Nabila and patient  Current living Arrangements: house      Able to return to prior arrangements: yes (dependent on PT/OT reccomendations)       Family/Social Support:  Care provided by: spouse/significant other, self  Provides care for: no one  Marital Status:   Wife  Nabila       Description of Support System: Supportive, Involved    Support Assessment: Adequate family and caregiver support, Adequate social supports    Current Resources:   Patient receiving home care services: No     Community Resources: None  Equipment currently used at home: grab bar, toilet, grab bar, tub/shower, shower chair, walker, rolling, wheelchair, manual  Supplies currently used at home:  (grab bar, tub/shower)    Employment/Financial:  Employment Status: retired        Financial Concerns: none   Referral to Financial Worker: No       Does the patient's insurance plan have a 3 day qualifying hospital stay waiver?  No    Lifestyle & Psychosocial Needs:  Social Determinants of Health     Food Insecurity: Not on file   Depression: Not at risk (11/7/2023)    PHQ-2     PHQ-2 Score: 0   Housing Stability: Not on file   Tobacco Use: Low Risk  (12/14/2023)    Patient  History     Smoking Tobacco Use: Never     Smokeless Tobacco Use: Never     Passive Exposure: Never   Financial Resource Strain: Not on file   Alcohol Use: Not on file   Transportation Needs: Not on file   Physical Activity: Not on file   Interpersonal Safety: Not on file   Stress: Not on file   Social Connections: Not on file       Functional Status:  Prior to admission patient needed assistance:   Dependent ADLs:: Ambulation-walker, Dressing, Bathing, Eating, Transfers, Toileting  Dependent IADLs:: Cleaning, Cooking, Laundry, Shopping, Meal Preparation, Medication Management, Transportation  Assesssment of Functional Status: Not at baseline with ADL Functioning, Not at baseline with mobility, Needs placement in a SNF/TCF for rehabilitation    Mental Health Status:  Mental Health Status: No Current Concerns       Chemical Dependency Status:  Chemical Dependency Status: Past Concern             Values/Beliefs:  Spiritual, Cultural Beliefs, [employer] Practices, Values that affect care:                 Additional Information:  Writer and  preceptor, Jamal Bettencourt met in pt's room with pt present and spoke primarily with Nabila, pt's spouse. Pt was somnolent during assessment, therefore, Nabila provided updates on pt's status. Currently PT is recommending TCU and OT recommending TCU vs. Home with assist and home care. Nabila currently is agreeable to plan for TCU in case that continues to be recommended at discharge. Writer notified [facility] admissions.     Plan:   Pt anticipated discharge date is 12/17/23 and may discharge to TCU vs. Home with assist and home care.     MARTHA Bush

## 2023-12-15 NOTE — PROGRESS NOTES
[facility]   Transplant Nephrology Progress Note  Date of Admission:  12/8/2023  Today's Date: 12/15/2023    Recommendations:  - No acute indications for dialysis.  - Recommend  holding diuresis today with gout flare, but would restart tomorrow.  - Agree with treatment for C. diff with oral vancomycin 125 mg qid x 14 days, then 125 mg bid x 14 days and reassess.  - Would consider 2-3 doses of iron ferrlecit 200 mg IV daily for anemia with mild iron deficiency when out of infection risk.    Assessment & Plan   # DDKT (SLK): Trend down in creatinine, now back to baseline.  Good urine output.  No acute indications for dialysis.   - PATI felt secondary to hypotension and sepsis with probable ATN.    - Baseline Creatinine: ~ 0.8-1.0   - Proteinuria: Normal (<0.2 grams)   - Date DSA Last Checked: Aug/2023      Latest DSA: No   - BK Viremia: No   - Kidney Tx Biopsy: Jun 20, 2023; Result: No diagnostic evidence of acute rejection.   Focal acute tubular necrosis.  Mild arterial and arteriolar sclerosis.    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  Transaminases Stable.  Followed by Transplant Surgery.     # Immunosuppression Prior to Admission: Tacrolimus immediate release (goal 6-8), Mycophenolic acid (dose 540 mg every 12 hours), and Prednisone (dose 5 mg daily)   - Present Immunosuppression: Tacrolimus immediate release (goal 6-8), Mycophenolate mofetil (dose 500 mg every 12 hours), and Methylprednisolone (dose 4 mg daily)   - Patient is in an immunosuppressed state and will continue to monitor for efficacy and toxicity of immunosuppression medications.   - Changes: Not at this time     # Infection Prophylaxis:   - PJP: Sulfa/TMP (Bactrim) - On hold with NPO  - CMV: None, prophylaxis completed; CMV IgG Ab positive  - Fungal: Fluconazole (Diflucan)    # Hypertension: Controlled;  Goal BP: < 140/90  (Hospitalization goal)   - Volume status: Mildly hypervolemic  EDW ~ 225 lbs   - Changes: Yes - Would hold furosemide for now with gout flare, but look to restart tomorrow.    # Elevated Blood Glucose: Glucose generally running ~ 140-190s   - On insulin.   - Management as per primary team.    # Anemia in Chronic Renal Disease: Hgb: Stable      FEDERICO: No   - Iron studies: Low iron saturation; Would consider course of IV iron when patient is out of infection risk concern.    # Mineral Bone Disorder:   - Secondary renal hyperparathyroidism; PTH level: Normal (15-65 pg/ml)        On treatment: None  - Vitamin D; level: Normal        On supplement: No  - Calcium; level: Normal when corrected for low serum albumin        On supplement: No  - Phosphorus; level: Normal        On supplement: No    # Electrolytes:   - Potassium; level: Normal        On supplement: No  - Magnesium; level: Normal        On supplement: No  - Bicarbonate; level: Normal        On supplement: No    # Possible Pneumonia: Patient with LLL atelectasis and small right pleural effusion on CXR.  He has a productive cough, but negative cultures.  On broad-spectrum antibiotics for abdominal infection.  Oxygenation is okay.    # Umbilical Hernia Mesh Repair: Patient is s/p scheduled redo mesh repair of umbilical hernia and minimal DHEERAJ of small bowel 12/7/23. Patient then represented 12/8/23 from home with severe LLQ abdominal pain and N/V 12/8/23.  Back to OR 12/10 and found to have bowel leak and repaired.  On broad spectrum-antibiotics and antifungal.    # CAD, s/p PCI: Asymptomatic.     # Paroxysmal A-Fib with Episodic RVR: Patient had episode of unresponsiveness 12/8/23- possible seizure vs apnea. Extensive workup with evidence of A fib w RVR, s/p PO dose of metoprolol.  Received Digoxin 500 mcg given IV once and subsequent dose of 250mcg on 12/9.  On apixaban PTA.  A. fib felt to be driven by septic shock. Started on amiodarone gtt on 12/9.  Presently in  A. fib, but rate controlled.  Anticoagulation restarted on heparin gtt.    # Obesity, Class II (BMI = 39.7): Trend up in weight, likely some volume related following surgery.   - Once patient heals and recovers from surgery, would recommend working on weight loss for overall health by increasing exercise and watching caloric intake.    # Clostridium difficile Colitis: Patient with h/o C. diff and likely colonized, but has had worsening diarrhea and on broad-spectrum antibiotics making him susceptible.   - Agree with plan to start oral vancomycin and would treat with 125 mg qid x 14 days, then 125 mg bid x 14 days and reassess.    # Peripheral Neuropathy: Had been stable on gabapentin, but presently on hold due to recent hypotension.     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  Previously treated with rituximab x 2.     # Hx of Gout: PTA on prednisone 5mg daily and allopurinol 300mg daily.  Also takes anakinra 100 mg with gout symptoms.  Allopurinol presently on hold due to NPO.  Rheumatology following.   - Agree with plan for restarting anakinra.    # Transplant History:  Etiology of Kidney Failure: IgA nephropathy and ANCA vasculitis  Tx: DDKT ([facility]) and Liver Tx ([facility])  Transplant: 6/6/2023 (Liver), 6/6/2023 (Kidney)  Significant changes in immunosuppression: None  Significant transplant-related complications: CMV Viremia and DGF    Recommendations were communicated to the primary team via this note.    Jeovany Scott MD  Transplant Nephrology  Contact information available via [facility] Paging/Directory    Interval History   Mr. Quinones's creatinine is 0.95 (12/15 0547); Trend up.  Good urine output.  Normal transaminases.  Other significant labs/tests/vitals: Stable electrolytes.  Stable hemoglobin.  Increased WBC.  Positive for C. diff.  No new events overnight.  Still complains of left knee pain, felt secondary to gout.  No chest pain or shortness of breath.  Stable leg swelling.  Occasional nausea, but no  vomiting.  Bowel movements are loose, but less frequent.  No fever, sweats or chills.    Review of Systems   4 point ROS was obtained and negative except as noted in the Interval History.    MEDICATIONS:   [Held by provider] allopurinol  300 mg Oral or Feeding Tube Daily    [Held by provider] atorvastatin  40 mg Oral or Feeding Tube QPM    fluconazole  400 mg Intravenous Q24H    [Held by provider] gabapentin  100 mg Oral or Feeding Tube BID    [Held by provider] gabapentin  200 mg Oral or Feeding Tube Daily    insulin aspart  1-12 Units Subcutaneous Q4H    iohexol  50 mL Oral Once    iohexol  500 mL Oral Once    levothyroxine  70 mcg Intravenous Daily    [Held by provider] levothyroxine  88 mcg Oral or Feeding Tube Daily    lipids plant base  250 mL Intravenous Once per day on     methylPREDNISolone  4 mg Intravenous Daily    metoprolol  5 mg Intravenous Q6H    [Held by provider] metoprolol tartrate  50 mg Oral BID    metroNIDAZOLE  500 mg Intravenous Q12H    [Held by provider] mycophenolate  750 mg Oral or NG Tube BID IS    mycophenolate mofetil  500 mg Intravenous BID    pantoprazole  40 mg Intravenous BID    piperacillin-tazobactam  3.375 g Intravenous Q6H    [Held by provider] predniSONE  5 mg Oral or Feeding Tube Daily    [Held by provider] senna-docusate  2 tablet Oral or Feeding Tube BID    sodium chloride (PF)  10-40 mL Intracatheter Q8H    sodium chloride (PF)  3 mL Intracatheter Q8H    [Held by provider] sulfamethoxazole-trimethoprim  1 tablet Oral or Feeding Tube Daily    tacrolimus  0.5 mg Oral BID IS    vancomycin  125 mg Oral 4x Daily      dextrose      heparin 2,300 Units/hr (12/15/23 1121)    parenteral nutrition - ADULT compounded formula      parenteral nutrition - ADULT compounded formula 45 mL/hr at 23       Physical Exam   Temp  Av.1  F (36.7  C)  Min: 96.7  F (35.9  C)  Max: 100.1  F (37.8  C)  Arterial Line BP  Min: 70/55  Max:  158/122  Arterial Line MAP (mmHg)  Av.2 mmHg  Min: 61 mmHg  Max: 207 mmHg      Pulse  Av  Min: 60  Max: 147 Resp  Av  Min: 9  Max: 36  FiO2 (%)  Av.9 %  Min: 40 %  Max: 50 %  SpO2  Av.9 %  Min: 85 %  Max: 100 %     /80 (BP Location: Left arm)   Pulse 99   Temp 99.8  F (37.7  C) (Oral)   Resp 16   Wt 110.1 kg (242 lb 11.6 oz)   SpO2 95%   BMI 38.02 kg/m     Date 23 0700 - 23 0659   Shift 3170-8984 6042-4884 1518-1016 24 Hour Total   INTAKE   I.V. 545.28   545.28      160   Shift Total(mL/kg) 705.28(6.33)   705.28(6.33)   OUTPUT   Urine 225   225   Shift Total(mL/kg) 225(2.02)   225(2.02)   Weight (kg) 111.5 111.5 111.5 111.5      Admit Weight: 111.5 kg (245 lb 13 oz)     GENERAL APPEARANCE: alert and no distress  HENT: mouth without ulcers or lesions, NG in place  RESP: lungs clear to auscultation from anterior - no rales, rhonchi or wheezes  CV: irregular rhythm and rate, no rub, no murmur  EDEMA: 1+ LE edema bilaterally  ABDOMEN: soft, nondistended, mild TTP, bowel sounds normal  MS: extremities normal - no gross deformities noted, no evidence of inflammation in joints, no muscle tenderness  SKIN: no rash  TX KIDNEY: normal  DIALYSIS ACCESS:  None    Data   All labs reviewed by me.  CMP  Recent Labs   Lab 12/15/23  1132 12/15/23  0802 12/15/23  0547 12/15/23  0412 23  0838 23  0504 23  0856 23  0359 23  0358 23  0355   NA  --   --  140  --   --  141  --  142  --  141   POTASSIUM  --   --  3.5  --   --  3.7  --  3.6  --  3.8   CHLORIDE  --   --  105  --   --  105  --  106  --  108*   CO2  --   --  24  --   --  21*  --  26  --  21*   ANIONGAP  --   --  11  --   --  15  --  10  --  12   * 187* 183* 171*   < > 169*   < > 196*   < > 170*   BUN  --   --  36.8*  --   --  42.2*  --  42.3*  --  40.1*   CR  --   --  0.95  --   --  1.02  --  1.19*  --  1.42*   GFRESTIMATED  --   --  88  --   --  81  --  67  --  54*   NAZARIO  --    --  8.0*  --   --  8.4*  --  8.1*  --  8.0*   MAG  --   --  2.0  --   --  1.8  --  2.1  --  2.3   PHOS  --   --  2.9  --   --  2.5  --  2.5  --  2.8   PROTTOTAL  --   --  4.7*  --   --  5.5*  --  5.3*  --  5.1*   ALBUMIN  --   --  2.4*  --   --  2.8*  --  3.0*  --  2.8*   BILITOTAL  --   --  1.0  --   --  0.8  --  0.7  --  0.6   ALKPHOS  --   --  115  --   --  106  --  90  --  70   AST  --   --  29  --   --  36  --  26  --  24   ALT  --   --  36  --   --  23  --  19  --  11    < > = values in this interval not displayed.     CBC  Recent Labs   Lab 12/15/23  0547 12/14/23  0504 12/13/23  0359 12/12/23  0355   HGB 12.1* 12.0* 10.4* 10.2*   WBC 15.4* 8.2 9.1 9.2   RBC 4.16* 4.16* 3.63* 3.56*   HCT 40.0 39.0* 33.8* 33.2*   MCV 96 94 93 93   MCH 29.1 28.8 28.7 28.7   MCHC 30.3* 30.8* 30.8* 30.7*   RDW 17.2* 17.2* 17.4* 17.3*   * 104* 110* 123*     INR  Recent Labs   Lab 12/11/23  0327 12/10/23  1408   INR 1.92* 2.29*   PTT 40* 40*     ABG  Recent Labs   Lab 12/11/23  0826 12/10/23  2024 12/10/23  1731 12/10/23  1408   PH 7.42 7.36 7.12* 7.35   PCO2 36 39 65* 38   PO2 63* 90 88 87   HCO3 23 22 21 21   O2PER 40 40 50 50      Urine Studies  Recent Labs   Lab Test 12/09/23  1405 11/07/23  0753 09/27/23  1157 09/25/23  0759 09/20/23  1645 08/29/23  1023 08/04/23  0715   COLOR Yellow Yellow Dark Yellow* Yellow Yellow   < > Yellow   APPEARANCE Clear Clear Clear Clear Clear   < > Clear   URINEGLC Negative Negative 50* Negative 100*   < > Negative   URINEBILI Negative Small* Small* Small* Negative   < > Negative   URINEKETONE Negative Negative Negative Negative Negative   < > Negative   SG 1.022 1.015 1.029 1.020 1.015   < > 1.010   UBLD Negative Negative Negative Negative Negative   < > Negative   URINEPH 5.0 7.0 6.0 7.0 7.0   < > 6.5   PROTEIN 10* Trace* 70* 30* Negative   < > Negative   UROBILINOGEN  --  0.2  --  0.2 0.2  --  0.2   NITRITE Negative Negative Negative Negative Negative   < > Negative   LEUKEST Negative  Trace* Negative Negative Negative   < > Small*   RBCU 2 0-2 1 None Seen  --   --  0-2   WBCU 6* 10-25* 7* None Seen  --   --  0-5    < > = values in this interval not displayed.     No lab results found.  PTH  Recent Labs   Lab Test 09/20/23  1611 08/01/23  0924 05/19/23  0956   PTHI 24 45 67*     Iron Studies  Recent Labs   Lab Test 11/21/23  0742 11/07/23  0753 10/05/23  0752 09/05/23  0702 08/07/23  0726 08/01/23  0924 11/22/22  0848   IRON 60* 53* 83 32* 28* 23* 68    226* 241 258 244 231* 219*   IRONSAT 22 23 34 12* 11* 10* 31   HOLA 204 341 919* 485* 440* 506* 429*       IMAGING:  All imaging studies reviewed by me.

## 2023-12-15 NOTE — PROGRESS NOTES
CLINICAL NUTRITION SERVICES - REASSESSMENT NOTE     Nutrition Prescription    RECOMMENDATIONS FOR MDs/PROVIDERS TO ORDER:  Once patient's diet is able to be advanced > FLD recommend calorie counts. Once patient is able to meet a minimum of 1150 kcals and 70 gram protein recommend discontinue TPN     Malnutrition Status:    Patient does not meet two of the established criteria necessary for diagnosing malnutrition but is at risk for malnutrition    Recommendations already ordered by Registered Dietitian (RD):  Continue current TPN     Future/Additional Recommendations:  -- monitor lytes, triglycerides with TPN  --monitor ability to advance diet. Once diet is advanced FLD recommend Magic cup (chocolate) with lunch and dinner.     EVALUATION OF THE PROGRESS TOWARD GOALS   Diet: NPO  Nutrition Support: (1080 ml): Dextrose 225 grams,  grams, 250 mL Lipids 6 times weekly (infuse @ 21 mL/hr x ~12 hrs/day. This provides 1674 kcals (22 kcals/kg, 1.6 g/kg protein, GIR 2.03, 26% calories from fat     Casey was busy with other disciplines when this writer attempted to visit      NEW FINDINGS   Labs (12/15): BUN 36.8 mg/dL (H), Cr 0.95 mg/dL     Meds:   Insulin    GI: + NGT, LBM (12/14) x 3     Weight: weight is slightly decreased since admission weight. Current weight 110.1 kg (12/13)    MALNUTRITION  % Intake: Decreased intake does not meet criteria  % Weight Loss: 1-2% in 1 week (moderate)  Subcutaneous Fat Loss: None observed - per previous RD note 12/11  Muscle Loss: None observed - per previous RD note 12/11  Fluid Accumulation/Edema: None noted  Malnutrition Diagnosis: Patient does not meet two of the established criteria necessary for diagnosing malnutrition but is at risk for malnutrition    Previous Goals   Total avg nutritional intake to meet a minimum of 20 kcal/kg and 1.5 g PRO/kg daily (per dosing wt 77 kg).   Evaluation: Met    Previous Nutrition Diagnosis  Inadequate oral intake   Evaluation: No  change    CURRENT NUTRITION DIAGNOSIS  Inadequate oral intake related to s/p surgery as evidenced by NPO status, NGT to LIS and the need for alternative route for nutrition      INTERVENTIONS  Implementation  Continue TPN     Goals  Total avg nutritional intake to meet a minimum of 20 kcal/kg and 1.5 g PRO/kg daily (per dosing wt 77 kg).    Monitoring/Evaluation  Progress toward goals will be monitored and evaluated per protocol.    Darlene Hammer MS/RD/LD/CNSC  7A/7B (beds 219-229) RD pager: [phone]  Weekend/Holiday RD pager: [phone]   Available on [employer]

## 2023-12-15 NOTE — PLAN OF CARE
Goal Outcome Evaluation:      Plan of Care Reviewed With: spouse, patient    Overall Patient Progress: improvingOverall Patient Progress: improving    Outcome Evaluation: Patient will discharge home versus TCU when medically stable.

## 2023-12-15 NOTE — PROGRESS NOTES
Immunosuppression Management Note:    Damion Quinones is a 68 year old male who is seen today  for immunosuppression management     I, Chad Clifton MD, I have examined the patient with our GEORGE/Fellow as part of a shared visit.   I participated in the rounds,  discussed and agree with the note and findings and  reviewed today's vital signs, medications, labs and imaging as noted in this note.  I  reviewed the  immunosuppression medications.  I personally provided a substantive portion of the care of this patient. I personally performed the immunosuppressive management of this patient, reviewed the overall  immunosuppression including drug levels, allograft function and provided the recommendations to adjust the dose to provide optimal levels to prevent rejection of the allograft and prevent toxicity to the organs. This was complex care due to the fresh allograft.   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 He underwent a redo mesh repair of umbilical hernia and minimal DHEERAJ of small bowel 12/7/23 with Dr. Clifton. Presented from home on POD1 after LLQ pain. Now s/p RTOR on 12/10 with repaired enterotomy and abdominal wash out.    s/p 12/7/23 Redo mesh repair of umbilical hernia and minimal DHEERAJ of small bowel; RTOR 12/10/23 Ex lap, repair of SB perforation, abdominal wash out:   - NPO with TPN    - CT scan 12/15   - Zosyn/diflucan   - Follow intra-op cultures, + yeast (saccharomyces cerevisiae)     Hx SLK 6/6/23:  Liver: LFTs WNL.    Kidney; PATI secondary to sepsis/SIRS: Cr now back to baseline ~0.8-1. Good UOP.    - Appreciate Transplant nephrology recs.      Immunosuppressed status secondary to medications:   Maintenance:    - PTA on Myfortic 540 mg BID -> now to  mg IV   - Tacrolimus 1.5 mg BID -> SL Tac 0.5 mg BID, now transitioned back to PO. Goal level 6-8. Monitor daily with changes.    - PTA Prednisone 5 mg on hold due to NPO -> 4 mg Methylpred IV daily.      Neuro:  Peripheral neuropathy: Follows with Neurology. PTA gabapentin, capsaicin cream. Holding orals  Acute post-op pain: LLQ abdominal pain.   - PRN IV Dilaudid while NPO (minimal use).      Hematology:   Acute blood loss anemia: Hgb 10-12 post-operatively (previously WNL). Monitor.  Chronic anticoagulation for Afib and DVT/PE ppx: PTA apixaban. Held starting 12/3 prior to procedure, not restarted post procedure.    - Continue low intensity heparin gtt with plan to transition to high intensity today.   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     Cardiorespiratory:   Hx CAD: PTA atorvastatin on hold.  PAF with RVR and hypotension: PTA metoprolol 50 mg BID and PTA apixaban on HOLD for now. Cardiology consulted earlier this admission due to uncontrolled afib. Received digoxin x2 then amiodorone gtt. IV metoprolol initiated and amiodarone off.    - Continue IV metoprolol while NPO.   Hypotension; Tachycardia: LA normalized. Likely 2/2 abdominal sepsis. Resolved.  Respiratory insufficiency: Secondary to pulmonary edema. Extubated 12/11 without difficulty. Now breathing comfortably on RA. Resolved.      GI/Nutrition:   At risk for malnutrition: Nutrition consulted. Continue TPN while NPO. CT po contrast on 12/15. NGT to LIS.   Diarrhea: BM x 7 overnight. Send C diff.      Endocrine:   Hypothyroidism: PTA levothyroxine -> IV while NPO.  Steroid/stress  induced hyperglycemia: Continue sliding scale insulin.     Fluid/Electrolytes:  Hypomagnesemia: Hold PTA Mag-Ox 800 mg BID .     : See kidney graft function.      Infectious disease:   Intradbominal sepsis: Improved since OR. Lactate improved. Cultures +yeast (Saccharomyces cerevisiae). Afebrile, leukocytosis.    - Continue fluconazole, Zosyn.      Cdiff: PCR/Ag +, toxin Neg. Due to symptoms, will start po vanco and IV flagyl.    Rheum:  Hx Gout: Follows with Rheumatology. PTA allopurinol, prednisone, PRN anakinra on hold. Consult Rheum today. Plan Anakinra 100mg x3     Prophylaxis: DVT (hep gtt, restart apixiban when able), fall, GI (PPI), pneumocystis (Bactrim on hold)     Disposition: 7A    GEOGRE/Fellow/Resident Provider:  Mayra Espinosa, NP   [id number]    Faculty: Dr. Dao BELLO PhD  __________________________________________________________________  Transplant History: Admitted 12/8/2023 for abdominal pain.   6/6/2023 (Liver), 6/6/2023 (Kidney), Postoperative day: 192 (Liver), 192 (Kidney)     Interval History: History is obtained from the patient, spouse  Overnight events: Diarrhea started overnight. Stools frequent, loose-watery. Also feels as though left knee is starting to have a gout flare.     ROS:   A 10-point review of systems was negative except as noted above.    Curent Meds:    [Held by provider] allopurinol  300 mg Oral or Feeding Tube Daily     anakinra  100 mg Subcutaneous Daily     [Held by provider] atorvastatin  40 mg Oral or Feeding Tube QPM     fluconazole  400 mg Intravenous Q24H     [Held by provider] gabapentin  100 mg Oral or Feeding Tube BID     [Held by provider] gabapentin  200 mg Oral or Feeding Tube Daily     insulin aspart  1-12 Units Subcutaneous Q4H     iohexol  50 mL Oral Once     levothyroxine  70 mcg Intravenous Daily     [Held by provider] levothyroxine  88 mcg Oral or Feeding Tube Daily     lipids plant base  250 mL Intravenous Once per day on Monday Tuesday Wednesday Thursday  Friday Saturday     methylPREDNISolone  4 mg Intravenous Daily     metoprolol  5 mg Intravenous Q6H     [Held by provider] metoprolol tartrate  50 mg Oral BID     metroNIDAZOLE  500 mg Intravenous Q12H     [Held by provider] mycophenolate  750 mg Oral or NG Tube BID IS     mycophenolate mofetil  500 mg Intravenous BID     pantoprazole  40 mg Intravenous BID     piperacillin-tazobactam  3.375 g Intravenous Q6H     [Held by provider] predniSONE  5 mg Oral or Feeding Tube Daily     [Held by provider] senna-docusate  2 tablet Oral or Feeding Tube BID     sodium chloride (PF)  10-40 mL Intracatheter Q8H     sodium chloride (PF)  3 mL Intracatheter Q8H     [Held by provider] sulfamethoxazole-trimethoprim  1 tablet Oral or Feeding Tube Daily     tacrolimus  0.5 mg Oral BID IS     vancomycin  125 mg Oral 4x Daily       Physical Exam:     Current Vitals:   /74 (BP Location: Left arm)   Pulse 79   Temp 99  F (37.2  C) (Oral)   Resp 16   Wt 110.1 kg (242 lb 11.6 oz)   SpO2 95%   BMI 38.02 kg/m      Vital sign ranges:    Temp:  [98.1  F (36.7  C)-99.8  F (37.7  C)] 99  F (37.2  C)  Pulse:  [] 79  Resp:  [14-16] 16  BP: (101-164)/(65-87) 103/74  SpO2:  [93 %-95 %] 95 %  Patient Vitals for the past 24 hrs:   BP Temp Temp src Pulse Resp SpO2   12/15/23 1405 103/74 99  F (37.2  C) Oral 79 16 --   12/15/23 1045 116/80 99.8  F (37.7  C) Oral 99 16 95 %   12/15/23 0557 101/65 98.1  F (36.7  C) Oral 103 -- 93 %   12/15/23 0219 123/72 98.5  F (36.9  C) Oral 107 16 94 %   12/14/23 2130 (!) 164/87 98.6  F (37  C) Oral 82 14 95 %   12/14/23 1842 113/74 98.5  F (36.9  C) Oral 86 16 94 %       /74 (BP Location: Left arm)   Pulse 79   Temp 99  F (37.2  C) (Oral)   Resp 16   Wt 110.1 kg (242 lb 11.6 oz)   SpO2 95%   BMI 38.02 kg/m      General Appearance: in no apparent distress  Respiratory: unlabored on RA  Cardiovascular: irregular; AF  GI: abdomen soft, appropriately tender over incision, mildly distended. No  rebound or guarding. Incision closed with sutures and inferior aspect packed (scant serosanguinous drainage noted. Drains x3 with s/s output  Skin: warm, dry  Musculoskeletal: BLE pedal edema +1, knee warm and erythematous  Neurologic: A&OX4. No confusion  Psychiatric: calm, behavior appropriate to situation    Frailty Scores          12/27/2022 11/22/2022   Frailty Scores   Final Score Frail Frail   Final Score Number 3 4       Data:   CMP  Recent Labs   Lab 12/15/23  1132 12/15/23  0802 12/15/23  0547 12/14/23  0838 12/14/23  0504 12/10/23  1415 12/10/23  1408 12/09/23  0159 12/08/23 2002   NA  --   --  140  --  141   < > 138   < > 139   POTASSIUM  --   --  3.5  --  3.7   < > 4.2   < > 4.2   CHLORIDE  --   --  105  --  105   < > 105   < >  --    CO2  --   --  24  --  21*   < > 18*   < >  --    * 187* 183*   < > 169*   < > 131*   < > 165*   BUN  --   --  36.8*  --  42.2*   < > 30.7*   < >  --    CR  --   --  0.95  --  1.02   < > 1.71*   < >  --    GFRESTIMATED  --   --  88  --  81   < > 44*   < >  --    NAZARIO  --   --  8.0*  --  8.4*   < > 7.4*   < >  --    ICAW  --   --   --   --   --   --  4.1*  --  4.9   MAG  --   --  2.0  --  1.8   < > 1.8   < >  --    PHOS  --   --  2.9  --  2.5   < > 3.5   < >  --    ALBUMIN  --   --  2.4*  --  2.8*   < > 2.9*   < >  --    BILITOTAL  --   --  1.0  --  0.8   < > 1.5*   < >  --    ALKPHOS  --   --  115  --  106   < > 59   < >  --    AST  --   --  29  --  36   < > 23   < >  --    ALT  --   --  36  --  23   < > 7   < >  --     < > = values in this interval not displayed.     CBC  Recent Labs   Lab 12/15/23  0547 12/14/23  0504   HGB 12.1* 12.0*   WBC 15.4* 8.2   * 104*     COAGS  Recent Labs   Lab 12/11/23  0327 12/10/23  1408   INR 1.92* 2.29*   PTT 40* 40*      Urinalysis  Recent Labs   Lab Test 12/09/23  1405 11/07/23  0753   COLOR Yellow Yellow   APPEARANCE Clear Clear   URINEGLC Negative Negative   URINEBILI Negative Small*   URINEKETONE Negative Negative   SG  1.022 1.015   UBLD Negative Negative   URINEPH 5.0 7.0   PROTEIN 10* Trace*   NITRITE Negative Negative   LEUKEST Negative Trace*   RBCU 2 0-2   WBCU 6* 10-25*     Virology:  Hepatitis C Antibody   Date Value Ref Range Status   06/05/2023 Nonreactive Nonreactive Final     BK Virus DNA copies/mL   Date Value Ref Range Status   10/05/2023 Not Detected Not Detected copies/mL Final   09/05/2023 Not Detected Not Detected copies/mL Final   08/29/2023 Not Detected Not Detected copies/mL Final   08/21/2023 Not Detected Not Detected copies/mL Final   08/17/2023 Not Detected Not Detected copies/mL Final   08/01/2023 Not Detected Not Detected copies/mL Final   07/31/2023 Not Detected Not Detected copies/mL Final

## 2023-12-16 ENCOUNTER — APPOINTMENT (OUTPATIENT)
Dept: OCCUPATIONAL THERAPY | Facility: CLINIC | Age: 68
End: 2023-12-16
Payer: COMMERCIAL

## 2023-12-16 ENCOUNTER — APPOINTMENT (OUTPATIENT)
Dept: PHYSICAL THERAPY | Facility: CLINIC | Age: 68
End: 2023-12-16
Payer: COMMERCIAL

## 2023-12-16 LAB
ALBUMIN SERPL BCG-MCNC: 2.5 G/DL (ref 3.5–5.2)
ALP SERPL-CCNC: 100 U/L (ref 40–150)
ALT SERPL W P-5'-P-CCNC: 25 U/L (ref 0–70)
ANION GAP SERPL CALCULATED.3IONS-SCNC: 9 MMOL/L (ref 7–15)
AST SERPL W P-5'-P-CCNC: 17 U/L (ref 0–45)
BILIRUB SERPL-MCNC: 0.5 MG/DL
BUN SERPL-MCNC: 33.8 MG/DL (ref 8–23)
CALCIUM SERPL-MCNC: 8.2 MG/DL (ref 8.8–10.2)
CHLORIDE SERPL-SCNC: 104 MMOL/L (ref 98–107)
CREAT SERPL-MCNC: 0.96 MG/DL (ref 0.67–1.17)
DEPRECATED HCO3 PLAS-SCNC: 25 MMOL/L (ref 22–29)
EGFRCR SERPLBLD CKD-EPI 2021: 87 ML/MIN/1.73M2
ERYTHROCYTE [DISTWIDTH] IN BLOOD BY AUTOMATED COUNT: 17.4 % (ref 10–15)
GLUCOSE BLDC GLUCOMTR-MCNC: 127 MG/DL (ref 70–99)
GLUCOSE BLDC GLUCOMTR-MCNC: 151 MG/DL (ref 70–99)
GLUCOSE BLDC GLUCOMTR-MCNC: 152 MG/DL (ref 70–99)
GLUCOSE BLDC GLUCOMTR-MCNC: 164 MG/DL (ref 70–99)
GLUCOSE BLDC GLUCOMTR-MCNC: 166 MG/DL (ref 70–99)
GLUCOSE BLDC GLUCOMTR-MCNC: 203 MG/DL (ref 70–99)
GLUCOSE SERPL-MCNC: 185 MG/DL (ref 70–99)
HCT VFR BLD AUTO: 34.1 % (ref 40–53)
HGB BLD-MCNC: 10.5 G/DL (ref 13.3–17.7)
MAGNESIUM SERPL-MCNC: 2 MG/DL (ref 1.7–2.3)
MCH RBC QN AUTO: 28.8 PG (ref 26.5–33)
MCHC RBC AUTO-ENTMCNC: 30.8 G/DL (ref 31.5–36.5)
MCV RBC AUTO: 93 FL (ref 78–100)
PHOSPHATE SERPL-MCNC: 3.2 MG/DL (ref 2.5–4.5)
PLATELET # BLD AUTO: 119 10E3/UL (ref 150–450)
POTASSIUM SERPL-SCNC: 3.6 MMOL/L (ref 3.4–5.3)
PROT SERPL-MCNC: 4.8 G/DL (ref 6.4–8.3)
RBC # BLD AUTO: 3.65 10E6/UL (ref 4.4–5.9)
SODIUM SERPL-SCNC: 138 MMOL/L (ref 135–145)
TACROLIMUS BLD-MCNC: 9.1 UG/L (ref 5–15)
TME LAST DOSE: NORMAL H
TME LAST DOSE: NORMAL H
UFH PPP CHRO-ACNC: 0.18 IU/ML
UFH PPP CHRO-ACNC: 0.34 IU/ML
WBC # BLD AUTO: 12.4 10E3/UL (ref 4–11)

## 2023-12-16 PROCEDURE — B4185 PARENTERAL SOL 10 GM LIPIDS: HCPCS | Mod: JZ | Performed by: SURGERY

## 2023-12-16 PROCEDURE — 250N000011 HC RX IP 250 OP 636: Mod: JZ

## 2023-12-16 PROCEDURE — 83735 ASSAY OF MAGNESIUM: CPT | Performed by: NURSE PRACTITIONER

## 2023-12-16 PROCEDURE — 99233 SBSQ HOSP IP/OBS HIGH 50: CPT | Mod: FS | Performed by: NURSE PRACTITIONER

## 2023-12-16 PROCEDURE — 80197 ASSAY OF TACROLIMUS: CPT | Performed by: NURSE PRACTITIONER

## 2023-12-16 PROCEDURE — 80053 COMPREHEN METABOLIC PANEL: CPT | Performed by: STUDENT IN AN ORGANIZED HEALTH CARE EDUCATION/TRAINING PROGRAM

## 2023-12-16 PROCEDURE — 120N000011 HC R&B TRANSPLANT UMMC

## 2023-12-16 PROCEDURE — 36592 COLLECT BLOOD FROM PICC: CPT | Performed by: NURSE PRACTITIONER

## 2023-12-16 PROCEDURE — 250N000013 HC RX MED GY IP 250 OP 250 PS 637

## 2023-12-16 PROCEDURE — 97535 SELF CARE MNGMENT TRAINING: CPT | Mod: GO

## 2023-12-16 PROCEDURE — 84100 ASSAY OF PHOSPHORUS: CPT | Performed by: NURSE PRACTITIONER

## 2023-12-16 PROCEDURE — 250N000012 HC RX MED GY IP 250 OP 636 PS 637: Performed by: NURSE PRACTITIONER

## 2023-12-16 PROCEDURE — 250N000011 HC RX IP 250 OP 636

## 2023-12-16 PROCEDURE — 250N000009 HC RX 250: Performed by: NURSE PRACTITIONER

## 2023-12-16 PROCEDURE — C9113 INJ PANTOPRAZOLE SODIUM, VIA: HCPCS

## 2023-12-16 PROCEDURE — 97530 THERAPEUTIC ACTIVITIES: CPT | Mod: GO

## 2023-12-16 PROCEDURE — 250N000011 HC RX IP 250 OP 636: Performed by: PHYSICIAN ASSISTANT

## 2023-12-16 PROCEDURE — 250N000011 HC RX IP 250 OP 636: Mod: JZ | Performed by: NURSE PRACTITIONER

## 2023-12-16 PROCEDURE — 250N000011 HC RX IP 250 OP 636: Performed by: STUDENT IN AN ORGANIZED HEALTH CARE EDUCATION/TRAINING PROGRAM

## 2023-12-16 PROCEDURE — 250N000011 HC RX IP 250 OP 636: Performed by: SURGERY

## 2023-12-16 PROCEDURE — 85027 COMPLETE CBC AUTOMATED: CPT | Performed by: STUDENT IN AN ORGANIZED HEALTH CARE EDUCATION/TRAINING PROGRAM

## 2023-12-16 PROCEDURE — 250N000009 HC RX 250: Mod: JZ | Performed by: SURGERY

## 2023-12-16 PROCEDURE — 85520 HEPARIN ASSAY: CPT | Performed by: SURGERY

## 2023-12-16 PROCEDURE — 97530 THERAPEUTIC ACTIVITIES: CPT | Mod: GP

## 2023-12-16 PROCEDURE — 258N000003 HC RX IP 258 OP 636

## 2023-12-16 PROCEDURE — 250N000009 HC RX 250: Performed by: STUDENT IN AN ORGANIZED HEALTH CARE EDUCATION/TRAINING PROGRAM

## 2023-12-16 PROCEDURE — 97116 GAIT TRAINING THERAPY: CPT | Mod: GP

## 2023-12-16 PROCEDURE — 999N000127 HC STATISTIC PERIPHERAL IV START W US GUIDANCE

## 2023-12-16 PROCEDURE — 258N000003 HC RX IP 258 OP 636: Performed by: STUDENT IN AN ORGANIZED HEALTH CARE EDUCATION/TRAINING PROGRAM

## 2023-12-16 PROCEDURE — 36415 COLL VENOUS BLD VENIPUNCTURE: CPT | Performed by: SURGERY

## 2023-12-16 PROCEDURE — 250N000012 HC RX MED GY IP 250 OP 636 PS 637

## 2023-12-16 RX ORDER — OXYCODONE HYDROCHLORIDE 5 MG/1
5 TABLET ORAL EVERY 4 HOURS PRN
Status: DISCONTINUED | OUTPATIENT
Start: 2023-12-16 | End: 2023-12-19

## 2023-12-16 RX ORDER — DEXTROSE MONOHYDRATE 50 MG/ML
INJECTION, SOLUTION INTRAVENOUS
Status: COMPLETED
Start: 2023-12-16 | End: 2023-12-16

## 2023-12-16 RX ADMIN — LEVOTHYROXINE SODIUM ANHYDROUS 70 MCG: 100 INJECTION, POWDER, LYOPHILIZED, FOR SOLUTION INTRAVENOUS at 08:55

## 2023-12-16 RX ADMIN — PIPERACILLIN AND TAZOBACTAM 3.38 G: 3; .375 INJECTION, POWDER, LYOPHILIZED, FOR SOLUTION INTRAVENOUS at 00:31

## 2023-12-16 RX ADMIN — ANAKINRA 100 MG: 100 INJECTION, SOLUTION SUBCUTANEOUS at 17:03

## 2023-12-16 RX ADMIN — ACETAMINOPHEN 650 MG: 325 TABLET, FILM COATED ORAL at 20:04

## 2023-12-16 RX ADMIN — METOPROLOL TARTRATE 5 MG: 5 INJECTION INTRAVENOUS at 06:39

## 2023-12-16 RX ADMIN — PIPERACILLIN AND TAZOBACTAM 3.38 G: 3; .375 INJECTION, POWDER, LYOPHILIZED, FOR SOLUTION INTRAVENOUS at 12:24

## 2023-12-16 RX ADMIN — MYCOPHENOLIC ACID 540 MG: 360 TABLET, DELAYED RELEASE ORAL at 18:09

## 2023-12-16 RX ADMIN — TACROLIMUS 0.5 MG: 0.5 CAPSULE ORAL at 08:57

## 2023-12-16 RX ADMIN — METOPROLOL TARTRATE 50 MG: 50 TABLET, FILM COATED ORAL at 19:47

## 2023-12-16 RX ADMIN — HEPARIN SODIUM 2250 UNITS/HR: 10000 INJECTION, SOLUTION INTRAVENOUS at 18:36

## 2023-12-16 RX ADMIN — HEPARIN SODIUM 2100 UNITS/HR: 10000 INJECTION, SOLUTION INTRAVENOUS at 09:58

## 2023-12-16 RX ADMIN — PANTOPRAZOLE SODIUM 40 MG: 40 INJECTION, POWDER, FOR SOLUTION INTRAVENOUS at 08:56

## 2023-12-16 RX ADMIN — PIPERACILLIN AND TAZOBACTAM 3.38 G: 3; .375 INJECTION, POWDER, LYOPHILIZED, FOR SOLUTION INTRAVENOUS at 18:09

## 2023-12-16 RX ADMIN — VANCOMYCIN HYDROCHLORIDE 125 MG: 125 CAPSULE ORAL at 19:47

## 2023-12-16 RX ADMIN — ATORVASTATIN CALCIUM 40 MG: 40 TABLET, FILM COATED ORAL at 19:47

## 2023-12-16 RX ADMIN — METRONIDAZOLE 500 MG: 500 INJECTION, SOLUTION INTRAVENOUS at 19:49

## 2023-12-16 RX ADMIN — VANCOMYCIN HYDROCHLORIDE 125 MG: 125 CAPSULE ORAL at 17:03

## 2023-12-16 RX ADMIN — METHYLPREDNISOLONE SODIUM SUCCINATE 4 MG: 40 INJECTION INTRAMUSCULAR; INTRAVENOUS at 08:56

## 2023-12-16 RX ADMIN — MYCOPHENOLATE MOFETIL 500 MG: 500 INJECTION, POWDER, LYOPHILIZED, FOR SOLUTION INTRAVENOUS at 09:58

## 2023-12-16 RX ADMIN — METOPROLOL TARTRATE 5 MG: 5 INJECTION INTRAVENOUS at 00:30

## 2023-12-16 RX ADMIN — VANCOMYCIN HYDROCHLORIDE 125 MG: 125 CAPSULE ORAL at 12:25

## 2023-12-16 RX ADMIN — OLIVE OIL AND SOYBEAN OIL 250 ML: 16; 4 INJECTION, EMULSION INTRAVENOUS at 20:04

## 2023-12-16 RX ADMIN — METRONIDAZOLE 500 MG: 500 INJECTION, SOLUTION INTRAVENOUS at 08:57

## 2023-12-16 RX ADMIN — TACROLIMUS 0.5 MG: 0.5 CAPSULE ORAL at 18:09

## 2023-12-16 RX ADMIN — FLUCONAZOLE IN SODIUM CHLORIDE 400 MG: 2 INJECTION, SOLUTION INTRAVENOUS at 14:00

## 2023-12-16 RX ADMIN — PIPERACILLIN AND TAZOBACTAM 3.38 G: 3; .375 INJECTION, POWDER, LYOPHILIZED, FOR SOLUTION INTRAVENOUS at 06:39

## 2023-12-16 RX ADMIN — HEPARIN SODIUM 1800 UNITS/HR: 10000 INJECTION, SOLUTION INTRAVENOUS at 00:11

## 2023-12-16 RX ADMIN — POTASSIUM PHOSPHATE, MONOBASIC POTASSIUM PHOSPHATE, DIBASIC: 224; 236 INJECTION, SOLUTION, CONCENTRATE INTRAVENOUS at 20:05

## 2023-12-16 RX ADMIN — VANCOMYCIN HYDROCHLORIDE 125 MG: 125 CAPSULE ORAL at 08:57

## 2023-12-16 RX ADMIN — PANTOPRAZOLE SODIUM 40 MG: 40 INJECTION, POWDER, FOR SOLUTION INTRAVENOUS at 19:48

## 2023-12-16 RX ADMIN — GABAPENTIN 100 MG: 100 CAPSULE ORAL at 19:47

## 2023-12-16 RX ADMIN — DEXTROSE MONOHYDRATE: 50 INJECTION, SOLUTION INTRAVENOUS at 10:02

## 2023-12-16 ASSESSMENT — ACTIVITIES OF DAILY LIVING (ADL)
ADLS_ACUITY_SCORE: 28
ADLS_ACUITY_SCORE: 30
ADLS_ACUITY_SCORE: 28
ADLS_ACUITY_SCORE: 30
ADLS_ACUITY_SCORE: 28
ADLS_ACUITY_SCORE: 28

## 2023-12-16 NOTE — PLAN OF CARE
Goal Outcome Evaluation:  /83 (BP Location: Left arm)   Pulse 64   Temp 98.1  F (36.7  C) (Oral)   Resp 16   Wt 107.7 kg (237 lb 6.4 oz)   SpO2 98%   BMI 37.18 kg/m      Shift: 9438-1023  Isolation Status: Contact  VSS on RA, HR irregular, baseline a-fib afebrile  Neuro: Aox4  Behaviors: cooperative and pleasant  B  Labs: WBC  Respiratory: WNL  Cardiac: Afib  Pain/Nausea: Denies  PRN: NA  Diet: CLD  IV Access: CL, PIV   Infusion(s): Heparin, ABX, TPN  Lines/Drains: JPs  GI/: voiding, LBM   Skin: No new concerned, wound care to abdomin complete  Mobility: Assist of 1  Events/Education:   Plan: POC, update team PRN

## 2023-12-16 NOTE — PLAN OF CARE
Goal Outcome Evaluation:      Plan of Care Reviewed With: patient, spouse    Overall Patient Progress: improvingOverall Patient Progress: improving    Outcome Evaluation: Pt anticipates discharge to TCU vs home with either home care vs OP rehab.

## 2023-12-16 NOTE — PLAN OF CARE
/75 (BP Location: Left arm)   Pulse 86   Temp 98  F (36.7  C) (Oral)   Resp 16   Wt 107.7 kg (237 lb 6.4 oz)   SpO2 95%   BMI 37.18 kg/m      Shift: 4730-9831  Isolation Status: Contact/Enteric Precautions (C. difficile, VRE)  VS: VSS on 2 L O2, afebrile  Neuro: Aox4, able to make needs known  Behaviors: Calm, cooperative, pleasant  BG: q4h - 125, 151, 164  Labs/Imaging: Xa 0.10, other AM labs pending.  Respiratory: Satting >90% on RA. 2 L O2 via NC for comfort overnight per pt request. Continuous SpO2 monitoring in place.  Cardiac: WDL. Telemetry monitoring in place.  Pain/Nausea: Nausea intermittent, managed with decreased stimuli + NGT to LIS. Endorses abdominal discomfort, no interventions requested at this time.  PRN: N/A  Diet: NPO ice chips  IV Access: DL internal jugular infusing, R PIV x 2 infusing  Infusion(s): TPN continuous, lipids, mycophenolate mofetil, Flagyl, Zosyn, heparin (RN-managed)  Lines/Drains: NGT to LIS (400 mL total output, yellow/green in color). MAE x 3, total output = #1 20 mL serosanguinous, #2 152 mL serosanguinous, #3 15 mL serous/serosanguinous.  GI/: Voiding spontaneously without difficulty. BM x 2 overnight.  Skin: Multiple skin tears/scabbing throughout, mepilex in place on R forearm and bilateral elbows. Abdominal incision dressing CDI, UTV. Wound care orders BID.  Mobility: Assist x 1/SBA to stand/pivot to commode  Events/Education: Heparin gtt increased, bolus given. Rest promoted overnight.  Plan: Continue with plan of care and notify team with any changes. Likely discharge Monday.

## 2023-12-16 NOTE — CONSULTS
Care Management Initial Consult    General Information  Assessment completed with: Patient, Care Team Member, Spouse or significant other, Nabila Quinones, spouse  Type of CM/SW Visit: Initial Assessment    Primary Care Provider verified and updated as needed: No   Readmission within the last 30 days: other (see comments) (Post-op)   Return Category: Post-op/Post-procedure complication  Reason for Consult: discharge planning  Advance Care Planning: Advance Care Planning Reviewed: concerns discussed          Communication Assessment  Patient's communication style: spoken language (English or Bilingual)    Hearing Difficulty or Deaf: no   Wear Glasses or Blind: yes    Cognitive  Cognitive/Neuro/Behavioral: WDL  Level of Consciousness: alert  Arousal Level: opens eyes spontaneously  Orientation: oriented x 4  Mood/Behavior: calm, cooperative  Best Language: 0 - No aphasia  Speech: clear, spontaneous, logical    Living Environment:   People in home: spouse  Nabila and patient  Current living Arrangements: house      Able to return to prior arrangements: yes       Family/Social Support:  Care provided by: spouse/significant other  Provides care for: no one  Marital Status:   Wife  Nabila       Description of Support System: Supportive, Involved    Support Assessment: Adequate family and caregiver support    Current Resources:   Patient receiving home care services: No     Community Resources: None  Equipment currently used at home: grab bar, toilet, grab bar, tub/shower, shower chair, walker, rolling, wheelchair, manual  Supplies currently used at home:  (grab bar, tub/shower)    Employment/Financial:  Employment Status: retired        Financial Concerns: none   Referral to Financial Worker: No       Does the patient's insurance plan have a 3 day qualifying hospital stay waiver?  No    Lifestyle & Psychosocial Needs:  Social Determinants of Health     Food Insecurity: Not on file   Depression: Not at risk (11/7/2023)     PHQ-2     PHQ-2 Score: 0   Housing Stability: Not on file   Tobacco Use: Low Risk  (12/14/2023)    Patient History     Smoking Tobacco Use: Never     Smokeless Tobacco Use: Never     Passive Exposure: Never   Financial Resource Strain: Not on file   Alcohol Use: Not on file   Transportation Needs: Not on file   Physical Activity: Not on file   Interpersonal Safety: Not on file   Stress: Not on file   Social Connections: Not on file       Functional Status:  Prior to admission patient needed assistance:   Dependent ADLs:: Ambulation-walker, Dressing, Bathing, Eating, Transfers, Toileting  Dependent IADLs:: Cleaning, Cooking, Laundry, Shopping, Meal Preparation, Medication Management, Transportation  Assesssment of Functional Status: Not at baseline with ADL Functioning, Not at baseline with mobility, Needs placement in a SNF/TCF for rehabilitation    Mental Health Status:  Mental Health Status: No Current Concerns       Chemical Dependency Status:  Chemical Dependency Status: Past Concern             Values/Beliefs:  Spiritual, Cultural Beliefs, [employer] Practices, Values that affect care:                 Additional Information:  Pt with a medical history significant for PAF, obesity, HTN, pre-diabetes, rheumatoid and psoriatic arthritis, CAD, and cirrhosis (alcohol + hemochromatosis) and 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 He underwent a redo mesh repair of umbilical hernia and minimal DHEERAJ of small bowel 12/7/23 with Dr. Clifton. Presented from home on POD1 after LLQ pain. Now s/p RTOR on 12/10 with repaired enterotomy and abdominal wash out.   Pt status reviewed/discussed with [facility].  Pt currently on TPN, diet advanced to clears.  Pt currently requiring IV antibiotics - day 7 out of 10 IV Zosyn, anticipate pt will transition to  oral diflucan at discharge. PT recommending TCU.  OT recommending home with home care.    Met with pt and his spouse.  Introduced RNCC role.  Per  discussion with pt and spouse they are open to short term rehab if recommended but would prefer to return home.  Pt wife notes that they have attempted to secure home care in the pat but d/t where they live - outside of [facility] they have not been successful.  Pt wife can bring pt into [facility] for any labs.  If plan is OP rehab they anticipate that they would go to [facility] as pt has had OP rehab at this facility in the recent past.  Pt and spouse note no concerns or questions at this time.      Transplant SW and RNCC will continue to monitor.    Subha Lujan, RN BSN, PHN, ACM-RN  7A RN Care Coordinator  Phone: [phone]  Pager [phone]    To contact the weekend RNCC  [facility] ([phone]) Saturday and Sunday    Units: 5A,5B,5C [phone]    Units: 6A, [phone]    Units 6B, 6C, 6D [phone]    Units: 7A, 7B, 7C, 7D, [phone]    [facility] ([phone]) Saturday and Sunday  Units: 5 Ortho, 5MS & WB ED Pager: [phone]  Units: 6MS, 8A & 10 ICU  Pager [phone]    12/16/2023 4:09 PM

## 2023-12-16 NOTE — PROGRESS NOTES
[facility]   Transplant Nephrology Progress Note  Date of Admission:  12/8/2023  Today's Date: 12/16/2023    Recommendations:  - No acute indications for dialysis.  - Recommend  holding diuresis today with gout flare, but would restart tomorrow. Looks dry on exam and BP stable   - Agree with treatment for C. diff with oral vancomycin 125 mg qid x 14 days, then 125 mg bid x 14 days and reassess.  - Would consider 2-3 doses of iron ferrlecit 200 mg IV daily for anemia with mild iron deficiency when out of infection risk.    Assessment & Plan   # DDKT (SLK): Stable Good urine output.  No acute indications for dialysis.   - PATI felt secondary to hypotension and sepsis with probable ATN.    - Baseline Creatinine: ~ 0.8-1.0   - Proteinuria: Normal (<0.2 grams)   - Date DSA Last Checked: Aug/2023      Latest DSA: No   - BK Viremia: No   - Kidney Tx Biopsy: Jun 20, 2023; Result: No diagnostic evidence of acute rejection.   Focal acute tubular necrosis.  Mild arterial and arteriolar sclerosis.    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  Transaminases Stable.  Followed by Transplant Surgery.     # Immunosuppression Prior to Admission: Tacrolimus immediate release (goal 6-8), Mycophenolic acid (dose 540 mg every 12 hours), and Prednisone (dose 5 mg daily)   - Present Immunosuppression: Tacrolimus immediate release (goal 6-8), Mycophenolate mofetil (dose 500 mg every 12 hours), and Methylprednisolone (dose 4 mg daily)   - Patient is in an immunosuppressed state and will continue to monitor for efficacy and toxicity of immunosuppression medications.   - Changes: Not at this time     # Infection Prophylaxis:   - PJP: Sulfa/TMP (Bactrim) - On hold with NPO  - CMV: None, prophylaxis completed; CMV IgG Ab positive  - Fungal: Fluconazole (Diflucan)    # Hypertension: Controlled;  Goal BP: < 140/90  (Hospitalization goal)   - Volume status: Mildly hypervolemic  EDW ~ 225 lbs   - Changes: Yes - Would hold furosemide for now with gout flare, but look to restart tomorrow.    # Elevated Blood Glucose: Glucose generally running ~ 140-190s   - On insulin.   - Management as per primary team.    # Anemia in Chronic Renal Disease: Hgb: Stable      FEDERICO: No   - Iron studies: Low iron saturation; Would consider course of IV iron when patient is out of infection risk concern.    # Mineral Bone Disorder:   - Secondary renal hyperparathyroidism; PTH level: Normal (15-65 pg/ml)        On treatment: None  - Vitamin D; level: Normal        On supplement: No  - Calcium; level: Normal when corrected for low serum albumin        On supplement: No  - Phosphorus; level: Normal        On supplement: No    # Electrolytes:   - Potassium; level: Normal        On supplement: No  - Magnesium; level: Normal        On supplement: No  - Bicarbonate; level: Normal        On supplement: No    # Possible Pneumonia: Patient with LLL atelectasis and small right pleural effusion on CXR.  He has a productive cough, but negative cultures.  On broad-spectrum antibiotics for abdominal infection.  Oxygenation is okay.    # Umbilical Hernia Mesh Repair: Patient is s/p scheduled redo mesh repair of umbilical hernia and minimal DHEERAJ of small bowel 12/7/23. Patient then represented 12/8/23 from home with severe LLQ abdominal pain and N/V 12/8/23.  Back to OR 12/10 and found to have bowel leak and repaired.  On broad spectrum-antibiotics and antifungal.    # CAD, s/p PCI: Asymptomatic.     # Paroxysmal A-Fib with Episodic RVR: Patient had episode of unresponsiveness 12/8/23- possible seizure vs apnea. Extensive workup with evidence of A fib w RVR, s/p PO dose of metoprolol.  Received Digoxin 500 mcg given IV once and subsequent dose of 250mcg on 12/9.  On apixaban PTA.  A. fib felt to be driven by septic shock. Started on amiodarone gtt on 12/9.  Presently in  A. fib, but rate controlled.  Anticoagulation restarted on heparin gtt.    # Obesity, Class II (BMI = 39.7): Trend up in weight, likely some volume related following surgery.   - Once patient heals and recovers from surgery, would recommend working on weight loss for overall health by increasing exercise and watching caloric intake.    # Clostridium difficile Colitis: Patient with h/o C. diff and likely colonized, but has had worsening diarrhea and on broad-spectrum antibiotics making him susceptible.   - Agree with plan to start oral vancomycin and would treat with 125 mg qid x 14 days, then 125 mg bid x 14 days and reassess.    # Peripheral Neuropathy: Had been stable on gabapentin, but presently on hold due to recent hypotension.     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  Previously treated with rituximab x 2.     # Hx of Gout: PTA on prednisone 5mg daily and allopurinol 300mg daily.  Also takes anakinra 100 mg with gout symptoms.  Allopurinol presently on hold due to NPO.  Rheumatology following.   - Agree with plan for restarting anakinra.    # Transplant History:  Etiology of Kidney Failure: IgA nephropathy and ANCA vasculitis  Tx: DDKT ([facility]) and Liver Tx ([facility])  Transplant: 6/6/2023 (Liver), 6/6/2023 (Kidney)  Significant changes in immunosuppression: None  Significant transplant-related complications: CMV Viremia and DGF    Recommendations were communicated to the primary team via this note.    Seen and discussed with Dr. Rl Naik, NP  Transplant Nephrology  Contact information available via [facility] Paging/Directory    Interval History   Mr. Miranda creatinine is 0.96 (12/16 0742); Stable.  Good urine output.  Other significant labs/tests/vitals: VSS  No events overnight.  no chest pain or shortness of breath.  mild leg swelling.  no nausea and vomiting.  Bowel movements are soft.  no fever, sweats or chills.     Review of Systems   4 point ROS was obtained and negative except as noted  in the Interval History.    MEDICATIONS:   [Held by provider] allopurinol  300 mg Oral or Feeding Tube Daily    anakinra  100 mg Subcutaneous Daily    [Held by provider] atorvastatin  40 mg Oral or Feeding Tube QPM    fluconazole  400 mg Intravenous Q24H    [Held by provider] gabapentin  100 mg Oral or Feeding Tube BID    [Held by provider] gabapentin  200 mg Oral or Feeding Tube Daily    insulin aspart  1-12 Units Subcutaneous Q4H    iohexol  50 mL Oral Once    levothyroxine  70 mcg Intravenous Daily    [Held by provider] levothyroxine  88 mcg Oral or Feeding Tube Daily    lipids plant base  250 mL Intravenous Once per day on     methylPREDNISolone  4 mg Intravenous Daily    metoprolol  5 mg Intravenous Q6H    [Held by provider] metoprolol tartrate  50 mg Oral BID    metroNIDAZOLE  500 mg Intravenous Q12H    [Held by provider] mycophenolate  750 mg Oral or NG Tube BID IS    mycophenolate mofetil  500 mg Intravenous BID    pantoprazole  40 mg Intravenous BID    piperacillin-tazobactam  3.375 g Intravenous Q6H    [Held by provider] predniSONE  5 mg Oral or Feeding Tube Daily    [Held by provider] senna-docusate  2 tablet Oral or Feeding Tube BID    sodium chloride (PF)  10-40 mL Intracatheter Q8H    sodium chloride (PF)  3 mL Intracatheter Q8H    [Held by provider] sulfamethoxazole-trimethoprim  1 tablet Oral or Feeding Tube Daily    tacrolimus  0.5 mg Oral BID IS    vancomycin  125 mg Oral 4x Daily      dextrose      heparin 2,100 Units/hr (23 0958)    parenteral nutrition - ADULT compounded formula      parenteral nutrition - ADULT compounded formula 45 mL/hr at 12/15/23 2036       Physical Exam   Temp  Av.1  F (36.7  C)  Min: 96.7  F (35.9  C)  Max: 100.1  F (37.8  C)  Arterial Line BP  Min: 70/55  Max: 158/122  Arterial Line MAP (mmHg)  Av.2 mmHg  Min: 61 mmHg  Max: 207 mmHg      Pulse  Av  Min: 60  Max: 147 Resp  Av  Min: 9  Max:  36  FiO2 (%)  Av.9 %  Min: 40 %  Max: 50 %  SpO2  Av.9 %  Min: 85 %  Max: 100 %     /83 (BP Location: Left arm)   Pulse 64   Temp 98.1  F (36.7  C) (Oral)   Resp 16   Wt 107.7 kg (237 lb 6.4 oz)   SpO2 98%   BMI 37.18 kg/m     Date 23 07 - 23 0659   Shift 9049-3622 3298-1760 7577-9030 24 Hour Total   INTAKE   I.V. 545.28   545.28      160   Shift Total(mL/kg) 705.28(6.33)   705.28(6.33)   OUTPUT   Urine 225   225   Shift Total(mL/kg) 225(2.02)   225(2.02)   Weight (kg) 111.5 111.5 111.5 111.5      Admit Weight: 111.5 kg (245 lb 13 oz)     GENERAL APPEARANCE: alert and no distress  HENT: mouth without ulcers or lesions, NG in place  RESP: lungs clear to auscultation from anterior - no rales, rhonchi or wheezes  CV: irregular rhythm and rate, no rub, no murmur  EDEMA: 1+ LE edema bilaterally  ABDOMEN: soft, nondistended, mild TTP, bowel sounds normal  MS: extremities normal - no gross deformities noted, no evidence of inflammation in joints, no muscle tenderness  SKIN: no rash  TX KIDNEY: normal  DIALYSIS ACCESS:  None    Data   All labs reviewed by me.  CMP  Recent Labs   Lab 23  0912 23  0742 23  0416 23  0038 12/15/23  0802 12/15/23  0547 23  0838 23  0504 23  0856 23  0359   NA  --  138  --   --   --  140  --  141  --  142   POTASSIUM  --  3.6  --   --   --  3.5  --  3.7  --  3.6   CHLORIDE  --  104  --   --   --  105  --  105  --  106   CO2  --  25  --   --   --  24  --  21*  --  26   ANIONGAP  --  9  --   --   --  11  --  15  --  10   * 185* 164* 151*   < > 183*   < > 169*   < > 196*   BUN  --  33.8*  --   --   --  36.8*  --  42.2*  --  42.3*   CR  --  0.96  --   --   --  0.95  --  1.02  --  1.19*   GFRESTIMATED  --  87  --   --   --  88  --  81  --  67   NAZARIO  --  8.2*  --   --   --  8.0*  --  8.4*  --  8.1*   MAG  --  2.0  --   --   --  2.0  --  1.8  --  2.1   PHOS  --  3.2  --   --   --  2.9  --  2.5  --  2.5    PROTTOTAL  --  4.8*  --   --   --  4.7*  --  5.5*  --  5.3*   ALBUMIN  --  2.5*  --   --   --  2.4*  --  2.8*  --  3.0*   BILITOTAL  --  0.5  --   --   --  1.0  --  0.8  --  0.7   ALKPHOS  --  100  --   --   --  115  --  106  --  90   AST  --  17  --   --   --  29  --  36  --  26   ALT  --  25  --   --   --  36  --  23  --  19    < > = values in this interval not displayed.     CBC  Recent Labs   Lab 12/16/23  0742 12/15/23  1533 12/15/23  0547 12/14/23  0504   HGB 10.5* 10.9* 12.1* 12.0*   WBC 12.4* 16.8* 15.4* 8.2   RBC 3.65* 3.79* 4.16* 4.16*   HCT 34.1* 35.3* 40.0 39.0*   MCV 93 93 96 94   MCH 28.8 28.8 29.1 28.8   MCHC 30.8* 30.9* 30.3* 30.8*   RDW 17.4* 17.3* 17.2* 17.2*   * 104* 109* 104*     INR  Recent Labs   Lab 12/11/23  0327 12/10/23  1408   INR 1.92* 2.29*   PTT 40* 40*     ABG  Recent Labs   Lab 12/11/23  0826 12/10/23  2024 12/10/23  1731 12/10/23  1408   PH 7.42 7.36 7.12* 7.35   PCO2 36 39 65* 38   PO2 63* 90 88 87   HCO3 23 22 21 21   O2PER 40 40 50 50      Urine Studies  Recent Labs   Lab Test 12/09/23  1405 11/07/23  0753 09/27/23  1157 09/25/23  0759 09/20/23  1645 08/29/23  1023 08/04/23  0715   COLOR Yellow Yellow Dark Yellow* Yellow Yellow   < > Yellow   APPEARANCE Clear Clear Clear Clear Clear   < > Clear   URINEGLC Negative Negative 50* Negative 100*   < > Negative   URINEBILI Negative Small* Small* Small* Negative   < > Negative   URINEKETONE Negative Negative Negative Negative Negative   < > Negative   SG 1.022 1.015 1.029 1.020 1.015   < > 1.010   UBLD Negative Negative Negative Negative Negative   < > Negative   URINEPH 5.0 7.0 6.0 7.0 7.0   < > 6.5   PROTEIN 10* Trace* 70* 30* Negative   < > Negative   UROBILINOGEN  --  0.2  --  0.2 0.2  --  0.2   NITRITE Negative Negative Negative Negative Negative   < > Negative   LEUKEST Negative Trace* Negative Negative Negative   < > Small*   RBCU 2 0-2 1 None Seen  --   --  0-2   WBCU 6* 10-25* 7* None Seen  --   --  0-5    < > = values  in this interval not displayed.     No lab results found.  PTH  Recent Labs   Lab Test 09/20/23  1611 08/01/23  0924 05/19/23  0956   PTHI 24 45 67*     Iron Studies  Recent Labs   Lab Test 11/21/23  0742 11/07/23  0753 10/05/23  0752 09/05/23  0702 08/07/23  0726 08/01/23  0924 11/22/22  0848   IRON 60* 53* 83 32* 28* 23* 68    226* 241 258 244 231* 219*   IRONSAT 22 23 34 12* 11* 10* 31   HOLA 204 341 919* 485* 440* 506* 429*       IMAGING:  All imaging studies reviewed by me.

## 2023-12-16 NOTE — PLAN OF CARE
Goal Outcome Evaluation:       /86 (BP Location: Left arm)   Pulse 87   Temp 97.8  F (36.6  C) (Oral)   Resp 16   Wt 110.1 kg (242 lb 11.6 oz)   SpO2 97%   BMI 38.02 kg/m      Shift: 9671-3396  Isolation Status: contact  VSS on RA, afebrile  Neuro: Aox4  Behaviors: COOPERATIVE  B  Labs: WBC 16.8  Respiratory: WNL  Cardiac: WNL  Pain/Nausea: NA  PRN: NA  Diet: NPO  IV Access: internal jugular, PIV  Infusion(s): Heparin  Lines/Drains: MAE  GI/: LBM 12/15  Skin: No new concerns  Mobility: Assisit  Events/Education: NA  Plan: POC, update team PRN

## 2023-12-16 NOTE — PROGRESS NOTES
Transplant Surgery  Inpatient Daily Progress Note  12/16/2023    Assessment & Plan: Damion Quinones is a 68 year old male with a history of PAF, obesity, HTN, pre-diabetes, rheumatoid and psoriatic arthritis, CAD, and cirrhosis (alcohol + hemochromatosis) and 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 He underwent a redo mesh repair of umbilical hernia and minimal DHEERAJ of small bowel 12/7/23 with Dr. Clifton. Presented from home on POD1 after LLQ pain. Now s/p RTOR on 12/10 with repaired enterotomy and abdominal wash out.    s/p 12/7/23 Redo mesh repair of umbilical hernia and minimal DHEERAJ of small bowel; RTOR 12/10/23 Ex lap, repair of SB perforation, abdominal wash out:  - Pulled NGT  - CLD, consider advancing tomorrow if tolerated  - Continue TPN until able to meet caloric needs   - CT scan 12/15 without leak   - Zosyn/diflucan (zosyn 10 days, diflucan 3 weeks)   - Follow intra-op cultures, + yeast (saccharomyces cerevisiae)     Hx SLK 6/6/23:  Liver: LFTs WNL.   Kidney; PATI secondary to sepsis/SIRS: Cr now back to baseline ~0.8-1. Good UOP.    - Appreciate Transplant nephrology recs.      Immunosuppressed status secondary to medications:   Maintenance:    - PTA on Myfortic 540 mg BID -> now to  mg IV. Transition back to oral myfortic   - Tacrolimus 1.5 mg BID -> SL Tac 0.5 mg BID, now transitioned back to PO. Goal level 6-8. Monitor daily with changes.    - PTA Prednisone 5 mg on hold due to NPO -> 4 mg Methylpred IV daily. -> back to oral 5     Neuro:  Peripheral neuropathy: Follows with Neurology. PTA gabapentin, capsaicin cream. Restart orals  Acute post-op pain: LLQ abdominal pain.   - prn dilaudid, prn oxy, prn tylenol     Hematology:   Acute blood loss anemia: Hgb 10-12 post-operatively (previously WNL). Monitor.  Chronic anticoagulation for Afib and DVT/PE ppx: PTA apixaban. Held starting 12/3 prior to procedure, not restarted post procedure.    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     Cardiorespiratory:   Hx CAD: PTA atorvastatin  PAF with RVR and hypotension: PTA metoprolol 50 mg BID and PTA apixaban on HOLD for now. Cardiology consulted earlier this admission due to uncontrolled afib. Received digoxin x2 then amiodorone gtt. IV metoprolol initiated and amiodarone off.    - start pta metoprolol today, transition off iv  Hypotension; Tachycardia: LA normalized. Likely 2/2 abdominal sepsis. Resolved.  Respiratory insufficiency: Secondary to pulmonary edema. Extubated 12/11 without difficulty. Now breathing comfortably on RA. Resolved.      GI/Nutrition:   At risk for malnutrition: Nutrition consulted. Continue TPN while unable to meet caloric needs. CT po contrast on 12/15. NGT pulled, advanced to CLD.   Diarrhea/Cdiff: C diff positive. BM x 1 overnight, flatus. Nausea. Oral vanc and flagyl.     Endocrine:   Hypothyroidism: PTA levothyroxine -> IV while NPO -> restart oral  Steroid/stress induced hyperglycemia: Continue sliding scale insulin.     Fluid/Electrolytes:  Hypomagnesemia: holding PTA Mag-Ox 800 mg BID .     : See kidney graft function.      Infectious disease:   Intradbominal sepsis: Improved since OR. Lactate improved. Cultures +yeast (Saccharomyces cerevisiae). Afebrile, leukocytosis.    - Continue fluconazole, Zosyn.      Cdiff: PCR/Ag +, toxin Neg. Due to symptoms, po vanco and IV flagyl.    Rheum:  Hx Gout: Follows with Rheumatology. PTA allopurinol, prednisone, PRN anakinra restarted 12/16. Appreciate rheum recs. Plan Anakinra 100mg x3     Prophylaxis: DVT (hep gtt, restart apixiban when able), fall, GI (PPI), pneumocystis (Bactrim on hold)     Disposition: 7A    Medical Student: Joan Panda MS4    GEORGE/Fellow/Resident Provider:   I agree with the documentation provided by the medical student above and was present for the  history and physical exam. I made updates or corrections as appropriate.     Sandro Ratliff MD PGY3  General Surgery       Faculty: Dr. Dao BELLO PhD  __________________________________________________________________  Transplant History: Admitted 12/8/2023 for abdominal pain.   6/6/2023 (Liver), 6/6/2023 (Kidney), Postoperative day: 193 (Liver), 193 (Kidney)     Interval History: History is obtained from the patient.  Overnight events: continues to have some loose stools otherwise feeling well. Abdominal pain is manageable. No fever/chills/sweats. Ready to have his ngt pulled.     ROS:   A 10-point review of systems was negative except as noted above.    Curent Meds:   [Held by provider] allopurinol  300 mg Oral or Feeding Tube Daily    anakinra  100 mg Subcutaneous Daily    [Held by provider] atorvastatin  40 mg Oral or Feeding Tube QPM    fluconazole  400 mg Intravenous Q24H    [Held by provider] gabapentin  100 mg Oral or Feeding Tube BID    [Held by provider] gabapentin  200 mg Oral or Feeding Tube Daily    insulin aspart  1-12 Units Subcutaneous Q4H    iohexol  50 mL Oral Once    levothyroxine  70 mcg Intravenous Daily    [Held by provider] levothyroxine  88 mcg Oral or Feeding Tube Daily    lipids plant base  250 mL Intravenous Once per day on Monday Tuesday Wednesday Thursday Friday Saturday    methylPREDNISolone  4 mg Intravenous Daily    metoprolol  5 mg Intravenous Q6H    [Held by provider] metoprolol tartrate  50 mg Oral BID    metroNIDAZOLE  500 mg Intravenous Q12H    [Held by provider] mycophenolate  750 mg Oral or NG Tube BID IS    mycophenolate mofetil  500 mg Intravenous BID    pantoprazole  40 mg Intravenous BID    piperacillin-tazobactam  3.375 g Intravenous Q6H    [Held by provider] predniSONE  5 mg Oral or Feeding Tube Daily    [Held by provider] senna-docusate  2 tablet Oral or Feeding Tube BID    sodium chloride (PF)  10-40 mL Intracatheter Q8H    sodium chloride (PF)  3 mL Intracatheter Q8H     [Held by provider] sulfamethoxazole-trimethoprim  1 tablet Oral or Feeding Tube Daily    tacrolimus  0.5 mg Oral BID IS    vancomycin  125 mg Oral 4x Daily       Physical Exam:     Current Vitals:   /83 (BP Location: Left arm)   Pulse 64   Temp 98.1  F (36.7  C) (Oral)   Resp 16   Wt 107.7 kg (237 lb 6.4 oz)   SpO2 98%   BMI 37.18 kg/m      Vital sign ranges:    Temp:  [97.8  F (36.6  C)-99.8  F (37.7  C)] 98.1  F (36.7  C)  Pulse:  [64-99] 64  Resp:  [16] 16  BP: (103-135)/(67-86) 110/83  SpO2:  [95 %-98 %] 98 %  Patient Vitals for the past 24 hrs:   BP Temp Temp src Pulse Resp SpO2 Weight   12/16/23 1008 110/83 98.1  F (36.7  C) Oral 64 16 98 % --   12/16/23 0600 135/75 98  F (36.7  C) Oral 86 16 95 % 107.7 kg (237 lb 6.4 oz)   12/16/23 0149 121/75 97.8  F (36.6  C) Oral 79 16 98 % --   12/15/23 2135 111/67 98.3  F (36.8  C) Oral 77 16 97 % --   12/15/23 1735 124/86 97.8  F (36.6  C) Oral 87 16 97 % --   12/15/23 1405 103/74 99  F (37.2  C) Oral 79 16 -- --   12/15/23 1045 116/80 99.8  F (37.7  C) Oral 99 16 95 % --       /83 (BP Location: Left arm)   Pulse 64   Temp 98.1  F (36.7  C) (Oral)   Resp 16   Wt 107.7 kg (237 lb 6.4 oz)   SpO2 98%   BMI 37.18 kg/m      General Appearance: in no apparent distress  Respiratory: unlabored on RA  Cardiovascular: warm, well perfused  GI: abdomen soft, appropriately tender over incision, mildly distended. No rebound or guarding. Incision closed. Drains x3 with s/s output  Skin: warm, dry  Musculoskeletal: BLE pedal edema +1, knee warm and erythematous  Neurologic: A&OX4. No confusion  Psychiatric: calm, behavior appropriate to situation    Frailty Scores          12/27/2022 11/22/2022   Frailty Scores   Final Score Frail Frail   Final Score Number 3 4       Data:   CMP  Recent Labs   Lab 12/16/23  0912 12/16/23  0742 12/15/23  0802 12/15/23  0547 12/10/23  1415 12/10/23  1408   NA  --  138  --  140   < > 138   POTASSIUM  --  3.6  --  3.5   < > 4.2    CHLORIDE  --  104  --  105   < > 105   CO2  --  25  --  24   < > 18*   * 185*   < > 183*   < > 131*   BUN  --  33.8*  --  36.8*   < > 30.7*   CR  --  0.96  --  0.95   < > 1.71*   GFRESTIMATED  --  87  --  88   < > 44*   NAZARIO  --  8.2*  --  8.0*   < > 7.4*   ICAW  --   --   --   --   --  4.1*   MAG  --  2.0  --  2.0   < > 1.8   PHOS  --  3.2  --  2.9   < > 3.5   ALBUMIN  --  2.5*  --  2.4*   < > 2.9*   BILITOTAL  --  0.5  --  1.0   < > 1.5*   ALKPHOS  --  100  --  115   < > 59   AST  --  17  --  29   < > 23   ALT  --  25  --  36   < > 7    < > = values in this interval not displayed.     CBC  Recent Labs   Lab 12/16/23  0742 12/15/23  1533   HGB 10.5* 10.9*   WBC 12.4* 16.8*   * 104*     COAGS  Recent Labs   Lab 12/11/23  0327 12/10/23  1408   INR 1.92* 2.29*   PTT 40* 40*      Urinalysis  Recent Labs   Lab Test 12/09/23  1405 11/07/23  0753   COLOR Yellow Yellow   APPEARANCE Clear Clear   URINEGLC Negative Negative   URINEBILI Negative Small*   URINEKETONE Negative Negative   SG 1.022 1.015   UBLD Negative Negative   URINEPH 5.0 7.0   PROTEIN 10* Trace*   NITRITE Negative Negative   LEUKEST Negative Trace*   RBCU 2 0-2   WBCU 6* 10-25*     Virology:  Hepatitis C Antibody   Date Value Ref Range Status   06/05/2023 Nonreactive Nonreactive Final     BK Virus DNA copies/mL   Date Value Ref Range Status   10/05/2023 Not Detected Not Detected copies/mL Final   09/05/2023 Not Detected Not Detected copies/mL Final   08/29/2023 Not Detected Not Detected copies/mL Final   08/21/2023 Not Detected Not Detected copies/mL Final   08/17/2023 Not Detected Not Detected copies/mL Final   08/01/2023 Not Detected Not Detected copies/mL Final   07/31/2023 Not Detected Not Detected copies/mL Final

## 2023-12-17 ENCOUNTER — APPOINTMENT (OUTPATIENT)
Dept: OCCUPATIONAL THERAPY | Facility: CLINIC | Age: 68
End: 2023-12-17
Payer: COMMERCIAL

## 2023-12-17 LAB
ALBUMIN SERPL BCG-MCNC: 2.6 G/DL (ref 3.5–5.2)
ALP SERPL-CCNC: 92 U/L (ref 40–150)
ALT SERPL W P-5'-P-CCNC: 22 U/L (ref 0–70)
ANION GAP SERPL CALCULATED.3IONS-SCNC: 10 MMOL/L (ref 7–15)
AST SERPL W P-5'-P-CCNC: 16 U/L (ref 0–45)
BACTERIA SPEC CULT: NORMAL
BACTERIA TISS BX CULT: NORMAL
BACTERIA WND CULT: NORMAL
BILIRUB SERPL-MCNC: 0.3 MG/DL
BUN SERPL-MCNC: 32.7 MG/DL (ref 8–23)
CALCIUM SERPL-MCNC: 8.3 MG/DL (ref 8.8–10.2)
CHLORIDE SERPL-SCNC: 104 MMOL/L (ref 98–107)
CREAT SERPL-MCNC: 0.94 MG/DL (ref 0.67–1.17)
DEPRECATED HCO3 PLAS-SCNC: 24 MMOL/L (ref 22–29)
EGFRCR SERPLBLD CKD-EPI 2021: 89 ML/MIN/1.73M2
ERYTHROCYTE [DISTWIDTH] IN BLOOD BY AUTOMATED COUNT: 17.2 % (ref 10–15)
GLUCOSE BLDC GLUCOMTR-MCNC: 105 MG/DL (ref 70–99)
GLUCOSE BLDC GLUCOMTR-MCNC: 126 MG/DL (ref 70–99)
GLUCOSE BLDC GLUCOMTR-MCNC: 139 MG/DL (ref 70–99)
GLUCOSE BLDC GLUCOMTR-MCNC: 140 MG/DL (ref 70–99)
GLUCOSE BLDC GLUCOMTR-MCNC: 153 MG/DL (ref 70–99)
GLUCOSE BLDC GLUCOMTR-MCNC: 154 MG/DL (ref 70–99)
GLUCOSE BLDC GLUCOMTR-MCNC: 191 MG/DL (ref 70–99)
GLUCOSE SERPL-MCNC: 150 MG/DL (ref 70–99)
HCT VFR BLD AUTO: 32.3 % (ref 40–53)
HGB BLD-MCNC: 10.4 G/DL (ref 13.3–17.7)
HGB BLD-MCNC: 9.8 G/DL (ref 13.3–17.7)
MAGNESIUM SERPL-MCNC: 2 MG/DL (ref 1.7–2.3)
MCH RBC QN AUTO: 28.3 PG (ref 26.5–33)
MCHC RBC AUTO-ENTMCNC: 30.3 G/DL (ref 31.5–36.5)
MCV RBC AUTO: 93 FL (ref 78–100)
PHOSPHATE SERPL-MCNC: 2.9 MG/DL (ref 2.5–4.5)
PLATELET # BLD AUTO: 148 10E3/UL (ref 150–450)
POTASSIUM SERPL-SCNC: 3.9 MMOL/L (ref 3.4–5.3)
PROT SERPL-MCNC: 4.9 G/DL (ref 6.4–8.3)
RBC # BLD AUTO: 3.46 10E6/UL (ref 4.4–5.9)
SODIUM SERPL-SCNC: 138 MMOL/L (ref 135–145)
TACROLIMUS BLD-MCNC: 8.8 UG/L (ref 5–15)
TME LAST DOSE: NORMAL H
TME LAST DOSE: NORMAL H
UFH PPP CHRO-ACNC: 0.38 IU/ML
UFH PPP CHRO-ACNC: 0.53 IU/ML
WBC # BLD AUTO: 9.6 10E3/UL (ref 4–11)

## 2023-12-17 PROCEDURE — 97530 THERAPEUTIC ACTIVITIES: CPT | Mod: GO

## 2023-12-17 PROCEDURE — 80053 COMPREHEN METABOLIC PANEL: CPT | Performed by: SURGERY

## 2023-12-17 PROCEDURE — 83735 ASSAY OF MAGNESIUM: CPT | Performed by: NURSE PRACTITIONER

## 2023-12-17 PROCEDURE — 85520 HEPARIN ASSAY: CPT | Performed by: SURGERY

## 2023-12-17 PROCEDURE — 80197 ASSAY OF TACROLIMUS: CPT | Performed by: NURSE PRACTITIONER

## 2023-12-17 PROCEDURE — 99233 SBSQ HOSP IP/OBS HIGH 50: CPT | Mod: FS | Performed by: NURSE PRACTITIONER

## 2023-12-17 PROCEDURE — 250N000011 HC RX IP 250 OP 636: Performed by: NURSE PRACTITIONER

## 2023-12-17 PROCEDURE — 250N000012 HC RX MED GY IP 250 OP 636 PS 637

## 2023-12-17 PROCEDURE — 84100 ASSAY OF PHOSPHORUS: CPT | Performed by: NURSE PRACTITIONER

## 2023-12-17 PROCEDURE — 36592 COLLECT BLOOD FROM PICC: CPT

## 2023-12-17 PROCEDURE — 85027 COMPLETE CBC AUTOMATED: CPT | Performed by: SURGERY

## 2023-12-17 PROCEDURE — 99231 SBSQ HOSP IP/OBS SF/LOW 25: CPT | Performed by: STUDENT IN AN ORGANIZED HEALTH CARE EDUCATION/TRAINING PROGRAM

## 2023-12-17 PROCEDURE — 250N000012 HC RX MED GY IP 250 OP 636 PS 637: Performed by: NURSE PRACTITIONER

## 2023-12-17 PROCEDURE — 250N000013 HC RX MED GY IP 250 OP 250 PS 637

## 2023-12-17 PROCEDURE — 250N000011 HC RX IP 250 OP 636: Mod: JZ

## 2023-12-17 PROCEDURE — 250N000011 HC RX IP 250 OP 636: Performed by: SURGERY

## 2023-12-17 PROCEDURE — 36415 COLL VENOUS BLD VENIPUNCTURE: CPT | Performed by: SURGERY

## 2023-12-17 PROCEDURE — 120N000011 HC R&B TRANSPLANT UMMC

## 2023-12-17 PROCEDURE — 250N000009 HC RX 250: Performed by: SURGERY

## 2023-12-17 PROCEDURE — C9113 INJ PANTOPRAZOLE SODIUM, VIA: HCPCS

## 2023-12-17 PROCEDURE — 97535 SELF CARE MNGMENT TRAINING: CPT | Mod: GO

## 2023-12-17 PROCEDURE — 250N000011 HC RX IP 250 OP 636: Performed by: PHYSICIAN ASSISTANT

## 2023-12-17 PROCEDURE — 85018 HEMOGLOBIN: CPT

## 2023-12-17 PROCEDURE — 36592 COLLECT BLOOD FROM PICC: CPT | Performed by: NURSE PRACTITIONER

## 2023-12-17 PROCEDURE — 250N000011 HC RX IP 250 OP 636

## 2023-12-17 RX ADMIN — PANTOPRAZOLE SODIUM 40 MG: 40 INJECTION, POWDER, FOR SOLUTION INTRAVENOUS at 08:07

## 2023-12-17 RX ADMIN — METOPROLOL TARTRATE 50 MG: 50 TABLET, FILM COATED ORAL at 20:37

## 2023-12-17 RX ADMIN — METRONIDAZOLE 500 MG: 500 INJECTION, SOLUTION INTRAVENOUS at 09:06

## 2023-12-17 RX ADMIN — PANTOPRAZOLE SODIUM 40 MG: 40 INJECTION, POWDER, FOR SOLUTION INTRAVENOUS at 20:38

## 2023-12-17 RX ADMIN — GABAPENTIN 100 MG: 100 CAPSULE ORAL at 08:07

## 2023-12-17 RX ADMIN — LEVOTHYROXINE SODIUM 88 MCG: 88 TABLET ORAL at 08:07

## 2023-12-17 RX ADMIN — METRONIDAZOLE 500 MG: 500 INJECTION, SOLUTION INTRAVENOUS at 20:38

## 2023-12-17 RX ADMIN — MYCOPHENOLIC ACID 540 MG: 360 TABLET, DELAYED RELEASE ORAL at 18:22

## 2023-12-17 RX ADMIN — PIPERACILLIN AND TAZOBACTAM 3.38 G: 3; .375 INJECTION, POWDER, LYOPHILIZED, FOR SOLUTION INTRAVENOUS at 00:42

## 2023-12-17 RX ADMIN — HEPARIN SODIUM 2250 UNITS/HR: 10000 INJECTION, SOLUTION INTRAVENOUS at 04:17

## 2023-12-17 RX ADMIN — VANCOMYCIN HYDROCHLORIDE 125 MG: 125 CAPSULE ORAL at 08:07

## 2023-12-17 RX ADMIN — PIPERACILLIN AND TAZOBACTAM 3.38 G: 3; .375 INJECTION, POWDER, LYOPHILIZED, FOR SOLUTION INTRAVENOUS at 18:22

## 2023-12-17 RX ADMIN — POTASSIUM PHOSPHATE, MONOBASIC POTASSIUM PHOSPHATE, DIBASIC: 224; 236 INJECTION, SOLUTION, CONCENTRATE INTRAVENOUS at 20:39

## 2023-12-17 RX ADMIN — ATORVASTATIN CALCIUM 40 MG: 40 TABLET, FILM COATED ORAL at 20:37

## 2023-12-17 RX ADMIN — TACROLIMUS 0.5 MG: 0.5 CAPSULE ORAL at 18:22

## 2023-12-17 RX ADMIN — ACETAMINOPHEN 650 MG: 325 TABLET, FILM COATED ORAL at 14:49

## 2023-12-17 RX ADMIN — SULFAMETHOXAZOLE AND TRIMETHOPRIM 1 TABLET: 400; 80 TABLET ORAL at 08:07

## 2023-12-17 RX ADMIN — PIPERACILLIN AND TAZOBACTAM 3.38 G: 3; .375 INJECTION, POWDER, LYOPHILIZED, FOR SOLUTION INTRAVENOUS at 05:57

## 2023-12-17 RX ADMIN — GABAPENTIN 100 MG: 100 CAPSULE ORAL at 20:37

## 2023-12-17 RX ADMIN — METOPROLOL TARTRATE 50 MG: 50 TABLET, FILM COATED ORAL at 08:07

## 2023-12-17 RX ADMIN — HEPARIN SODIUM 2250 UNITS/HR: 10000 INJECTION, SOLUTION INTRAVENOUS at 15:42

## 2023-12-17 RX ADMIN — VANCOMYCIN HYDROCHLORIDE 125 MG: 125 CAPSULE ORAL at 15:42

## 2023-12-17 RX ADMIN — ALLOPURINOL 300 MG: 300 TABLET ORAL at 08:07

## 2023-12-17 RX ADMIN — PREDNISONE 5 MG: 5 TABLET ORAL at 08:07

## 2023-12-17 RX ADMIN — FLUCONAZOLE IN SODIUM CHLORIDE 400 MG: 2 INJECTION, SOLUTION INTRAVENOUS at 13:23

## 2023-12-17 RX ADMIN — MYCOPHENOLIC ACID 540 MG: 360 TABLET, DELAYED RELEASE ORAL at 08:07

## 2023-12-17 RX ADMIN — PIPERACILLIN AND TAZOBACTAM 3.38 G: 3; .375 INJECTION, POWDER, LYOPHILIZED, FOR SOLUTION INTRAVENOUS at 11:38

## 2023-12-17 RX ADMIN — TACROLIMUS 0.5 MG: 0.5 CAPSULE ORAL at 08:07

## 2023-12-17 RX ADMIN — VANCOMYCIN HYDROCHLORIDE 125 MG: 125 CAPSULE ORAL at 20:37

## 2023-12-17 RX ADMIN — VANCOMYCIN HYDROCHLORIDE 125 MG: 125 CAPSULE ORAL at 11:38

## 2023-12-17 ASSESSMENT — ACTIVITIES OF DAILY LIVING (ADL)
ADLS_ACUITY_SCORE: 28

## 2023-12-17 NOTE — PROGRESS NOTES
Transplant Surgery  Inpatient Daily Progress Note  12/17/2023    Assessment & Plan: Damion Quinones is a 68 year old male with a history of PAF, obesity, HTN, pre-diabetes, rheumatoid and psoriatic arthritis, CAD, and cirrhosis (alcohol + hemochromatosis) and 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 He underwent a redo mesh repair of umbilical hernia and minimal DHEERAJ of small bowel 12/7/23 with Dr. Clifton. Presented from home on POD1 after LLQ pain. Now s/p RTOR on 12/10 with repaired enterotomy and abdominal wash out.    Brief plans:  - advance diet to fulls  - continue antibiotics  - will revisit starting doac once tolerating a full diet and nearing discharge    s/p 12/7/23 Redo mesh repair of umbilical hernia and minimal DHEERAJ of small bowel; RTOR 12/10/23 Ex lap, repair of SB perforation, abdominal wash out:  - advance to FLD today  - Continue TPN until able to meet caloric needs  - CT scan 12/15 without leak  - Zosyn/diflucan (zosyn 10 days, diflucan 3 weeks)  - Follow intra-op cultures, + yeast (saccharomyces cerevisiae)     Hx SLK 6/6/23:  Liver: LFTs WNL.   Kidney; PATI secondary to sepsis/SIRS: Cr now back to baseline ~0.8-1. Good UOP.    - Appreciate Transplant nephrology recs.      Immunosuppressed status secondary to medications:   Maintenance:    - PTA on Myfortic 540 mg BID -> now to  mg IV. Transition back to oral myfortic   - Tacrolimus 1.5 mg BID -> SL Tac 0.5 mg BID, now transitioned back to PO. Goal level 6-8. Monitor daily with changes.    - PTA Prednisone 5 mg on hold due to NPO -> 4 mg Methylpred IV daily. -> back to oral 5     Neuro:  Peripheral neuropathy: Follows with Neurology. PTA gabapentin, capsaicin cream. Restart orals  Acute post-op pain: LLQ abdominal pain.   - prn dilaudid, prn oxy, prn tylenol     Hematology:   Acute blood loss anemia: Hgb 10-12 post-operatively (previously WNL). Monitor.  Chronic anticoagulation for Afib and DVT/PE ppx: PTA apixaban. Held  starting 12/3 prior to procedure, not restarted post procedure.    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     Cardiorespiratory:   Hx CAD: PTA atorvastatin  PAF with RVR and hypotension: PTA metoprolol 50 mg BID and PTA apixaban on HOLD for now. Cardiology consulted earlier this admission due to uncontrolled afib. Received digoxin x2 then amiodorone gtt. IV metoprolol initiated and amiodarone off.    - start pta metoprolol today, transition off iv  Hypotension; Tachycardia: LA normalized. Likely 2/2 abdominal sepsis. Resolved.  Respiratory insufficiency: Secondary to pulmonary edema. Extubated 12/11 without difficulty. Now breathing comfortably on RA. Resolved.      GI/Nutrition:   At risk for malnutrition: Nutrition consulted. Continue TPN while unable to meet caloric needs. CT po contrast on 12/15. NGT pulled, on FLD  Diarrhea/Cdiff: C diff positive. Oral vanc and flagyl.     Endocrine:   Hypothyroidism: PTA levothyroxine -> IV while NPO -> restart oral  Steroid/stress induced hyperglycemia: Continue sliding scale insulin.     Fluid/Electrolytes:  Hypomagnesemia: holding PTA Mag-Ox 800 mg BID .     : See kidney graft function.      Infectious disease:   Intradbominal sepsis: Improved since OR. Lactate improved. Cultures +yeast (Saccharomyces cerevisiae). Afebrile, leukocytosis.    - Continue fluconazole, Zosyn.     Cdiff: PCR/Ag +, toxin Neg. Due to symptoms, po vanco and IV flagyl.    Rheum:  Hx Gout: Follows with Rheumatology. PTA allopurinol, prednisone, PRN anakinra restarted 12/16. Appreciate rheum recs. Plan Anakinra 100mg x3     Prophylaxis: DVT (hep gtt, restart apixiban when able), fall, GI (PPI), pneumocystis (Bactrim)     Disposition: 7A    GEORGE/Fellow/Resident Provider:   Sandro Ratliff MD PGY3  General  Surgery    Faculty: Dr. Dao BELLO PhD  __________________________________________________________________  Transplant History: Admitted 12/8/2023 for abdominal pain.   6/6/2023 (Liver), 6/6/2023 (Kidney), Postoperative day: 194 (Liver), 194 (Kidney)     Interval History: History is obtained from the patient.  Overnight events: doing okay. Tolerating a cld. Liked having his gatorade. Abdomen feels okay.    ROS:   A 10-point review of systems was negative except as noted above.    Curent Meds:   allopurinol  300 mg Oral or Feeding Tube Daily    atorvastatin  40 mg Oral or Feeding Tube QPM    fluconazole  400 mg Intravenous Q24H    gabapentin  100 mg Oral or Feeding Tube BID    insulin aspart  1-12 Units Subcutaneous Q4H    iohexol  50 mL Oral Once    levothyroxine  88 mcg Oral or Feeding Tube Daily    lipids plant base  250 mL Intravenous Once per day on Monday Tuesday Wednesday Thursday Friday Saturday    metoprolol tartrate  50 mg Oral BID    metroNIDAZOLE  500 mg Intravenous Q12H    mycophenolic acid  540 mg Oral BID IS    pantoprazole  40 mg Intravenous BID    piperacillin-tazobactam  3.375 g Intravenous Q6H    predniSONE  5 mg Oral or Feeding Tube Daily    sodium chloride (PF)  10-40 mL Intracatheter Q8H    sodium chloride (PF)  3 mL Intracatheter Q8H    sulfamethoxazole-trimethoprim  1 tablet Oral or Feeding Tube Daily    tacrolimus  0.5 mg Oral BID IS    vancomycin  125 mg Oral 4x Daily       Physical Exam:     Current Vitals:   /83 (BP Location: Left arm)   Pulse 80   Temp 97.8  F (36.6  C) (Oral)   Resp 16   Wt 104.6 kg (230 lb 11.2 oz)   SpO2 96%   BMI 36.13 kg/m      Vital sign ranges:    Temp:  [97.8  F (36.6  C)-98  F (36.7  C)] 97.8  F (36.6  C)  Pulse:  [56-87] 80  Resp:  [16] 16  BP: (115-141)/(65-94) 138/83  SpO2:  [93 %-97 %] 96 %  Patient Vitals for the past 24 hrs:   BP Temp Temp src Pulse Resp SpO2 Weight   12/17/23 0843 -- -- -- -- -- -- 104.6 kg (230 lb 11.2 oz)   12/17/23 0544 138/83  97.8  F (36.6  C) Oral 80 16 96 % --   12/17/23 0038 117/65 97.9  F (36.6  C) Oral 56 16 97 % --   12/16/23 2154 (!) 141/94 97.8  F (36.6  C) Oral 70 16 93 % --   12/16/23 1819 -- 98  F (36.7  C) Oral -- 16 -- --   12/16/23 1351 115/75 98  F (36.7  C) Oral 87 16 97 % --       /83 (BP Location: Left arm)   Pulse 80   Temp 97.8  F (36.6  C) (Oral)   Resp 16   Wt 104.6 kg (230 lb 11.2 oz)   SpO2 96%   BMI 36.13 kg/m      General Appearance: in no apparent distress  Respiratory: unlabored on RA  Cardiovascular: warm, well perfused  GI: abdomen soft, appropriately tender over incision, mildly distended. No rebound or guarding. Incision closed. Drains x3 with s/s output  Skin: warm, dry  Musculoskeletal: BLE pedal edema +1, knee warm and erythematous  Neurologic: A&OX4. No confusion  Psychiatric: calm, behavior appropriate to situation    Frailty Scores          12/27/2022 11/22/2022   Frailty Scores   Final Score Frail Frail   Final Score Number 3 4       Data:   CMP  Recent Labs   Lab 12/17/23  1140 12/17/23  0805 12/17/23  0541 12/17/23  0537 12/16/23  0912 12/16/23  0742 12/10/23  1415 12/10/23  1408   NA  --   --   --  138  --  138   < > 138   POTASSIUM  --   --   --  3.9  --  3.6   < > 4.2   CHLORIDE  --   --   --  104  --  104   < > 105   CO2  --   --   --  24  --  25   < > 18*   * 153*   < > 150*   < > 185*   < > 131*   BUN  --   --   --  32.7*  --  33.8*   < > 30.7*   CR  --   --   --  0.94  --  0.96   < > 1.71*   GFRESTIMATED  --   --   --  89  --  87   < > 44*   NAZARIO  --   --   --  8.3*  --  8.2*   < > 7.4*   ICAW  --   --   --   --   --   --   --  4.1*   MAG  --   --   --  2.0  --  2.0   < > 1.8   PHOS  --   --   --  2.9  --  3.2   < > 3.5   ALBUMIN  --   --   --  2.6*  --  2.5*   < > 2.9*   BILITOTAL  --   --   --  0.3  --  0.5   < > 1.5*   ALKPHOS  --   --   --  92  --  100   < > 59   AST  --   --   --  16  --  17   < > 23   ALT  --   --   --  22  --  25   < > 7    < > = values in this  interval not displayed.     CBC  Recent Labs   Lab 12/17/23  0537 12/16/23  0742   HGB 9.8* 10.5*   WBC 9.6 12.4*   * 119*     COAGS  Recent Labs   Lab 12/11/23  0327 12/10/23  1408   INR 1.92* 2.29*   PTT 40* 40*      Urinalysis  Recent Labs   Lab Test 12/09/23  1405 11/07/23  0753   COLOR Yellow Yellow   APPEARANCE Clear Clear   URINEGLC Negative Negative   URINEBILI Negative Small*   URINEKETONE Negative Negative   SG 1.022 1.015   UBLD Negative Negative   URINEPH 5.0 7.0   PROTEIN 10* Trace*   NITRITE Negative Negative   LEUKEST Negative Trace*   RBCU 2 0-2   WBCU 6* 10-25*     Virology:  Hepatitis C Antibody   Date Value Ref Range Status   06/05/2023 Nonreactive Nonreactive Final     BK Virus DNA copies/mL   Date Value Ref Range Status   10/05/2023 Not Detected Not Detected copies/mL Final   09/05/2023 Not Detected Not Detected copies/mL Final   08/29/2023 Not Detected Not Detected copies/mL Final   08/21/2023 Not Detected Not Detected copies/mL Final   08/17/2023 Not Detected Not Detected copies/mL Final   08/01/2023 Not Detected Not Detected copies/mL Final   07/31/2023 Not Detected Not Detected copies/mL Final

## 2023-12-17 NOTE — PROGRESS NOTES
[facility]   Transplant Nephrology Progress Note  Date of Admission:  12/8/2023  Today's Date: 12/17/2023    Recommendations:  - No acute indications for dialysis.  - Can restart diuretic lasix 40mg ID PO  - Agree with treatment for C. diff with oral vancomycin 125 mg qid x 14 days, then 125 mg bid x 14 days and reassess.  - Would consider 2-3 doses of iron ferrlecit 200 mg IV daily for anemia with mild iron deficiency when out of infection risk.    Assessment & Plan   # DDKT (SLK): Stable Good urine output.  No acute indications for dialysis.   - PATI felt secondary to hypotension and sepsis with probable ATN.    - Baseline Creatinine: ~ 0.8-1.0   - Proteinuria: Normal (<0.2 grams)   - Date DSA Last Checked: Aug/2023      Latest DSA: No   - BK Viremia: No   - Kidney Tx Biopsy: Jun 20, 2023; Result: No diagnostic evidence of acute rejection.   Focal acute tubular necrosis.  Mild arterial and arteriolar sclerosis.    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  Transaminases Stable.  Followed by Transplant Surgery.     # Immunosuppression Prior to Admission: Tacrolimus immediate release (goal 6-8), Mycophenolic acid (dose 540 mg every 12 hours), and Prednisone (dose 5 mg daily)   - Present Immunosuppression: Tacrolimus immediate release (goal 6-8), Mycophenolate mofetil (dose 500 mg every 12 hours), and Methylprednisolone (dose 4 mg daily)   - Patient is in an immunosuppressed state and will continue to monitor for efficacy and toxicity of immunosuppression medications.   - Changes: Not at this time     # Infection Prophylaxis:   - PJP: Sulfa/TMP (Bactrim) - On hold with NPO  - CMV: None, prophylaxis completed; CMV IgG Ab positive  - Fungal: Fluconazole (Diflucan)    # Hypertension: Controlled;  Goal BP: < 140/90 (Hospitalization goal)   - Volume status: Mildly hypervolemic  EDW ~ 225 lbs   -  Changes: Yes - can consider lasix 40mg PO BID    # Elevated Blood Glucose: Glucose generally running ~ 140-190s   - On insulin.   - Management as per primary team.    # Anemia in Chronic Renal Disease: Hgb: Trend down      FEDERICO: No   - Iron studies: Low iron saturation; Would consider course of IV iron when patient is out of infection risk concern.    # Mineral Bone Disorder:   - Secondary renal hyperparathyroidism; PTH level: Normal (15-65 pg/ml)        On treatment: None  - Vitamin D; level: Normal        On supplement: No  - Calcium; level: Normal when corrected for low serum albumin        On supplement: No  - Phosphorus; level: Normal        On supplement: No    # Electrolytes:   - Potassium; level: Normal        On supplement: No  - Magnesium; level: Normal        On supplement: No  - Bicarbonate; level: Normal        On supplement: No    # Possible Pneumonia: Patient with LLL atelectasis and small right pleural effusion on CXR.  He has a productive cough, but negative cultures.  On broad-spectrum antibiotics for abdominal infection.  Oxygenation is okay.    # Umbilical Hernia Mesh Repair: Patient is s/p scheduled redo mesh repair of umbilical hernia and minimal DHEERAJ of small bowel 12/7/23. Patient then represented 12/8/23 from home with severe LLQ abdominal pain and N/V 12/8/23.  Back to OR 12/10 and found to have bowel leak and repaired.  On broad spectrum-antibiotics and antifungal.    # CAD, s/p PCI: Asymptomatic.     # Paroxysmal A-Fib with Episodic RVR: Patient had episode of unresponsiveness 12/8/23- possible seizure vs apnea. Extensive workup with evidence of A fib w RVR, s/p PO dose of metoprolol.  Received Digoxin 500 mcg given IV once and subsequent dose of 250mcg on 12/9.  On apixaban PTA.  A. fib felt to be driven by septic shock. Started on amiodarone gtt on 12/9.  Presently in A. fib, but rate controlled.  Anticoagulation restarted on heparin gtt.    # Obesity, Class II (BMI = 39.7): Trend up in  weight, likely some volume related following surgery.   - Once patient heals and recovers from surgery, would recommend working on weight loss for overall health by increasing exercise and watching caloric intake.    # Clostridium difficile Colitis: Patient with h/o C. diff and likely colonized, but has had worsening diarrhea and on broad-spectrum antibiotics making him susceptible.   - Agree with plan to start oral vancomycin and would treat with 125 mg qid x 14 days, then 125 mg bid x 14 days and reassess.    # Peripheral Neuropathy: Had been stable on gabapentin, but presently on hold due to recent hypotension.     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  Previously treated with rituximab x 2.     # Hx of Gout: PTA on prednisone 5mg daily and allopurinol 300mg daily.  Also takes anakinra 100 mg with gout symptoms.  Allopurinol presently on hold due to NPO.  Rheumatology following.   - Agree with plan for restarting anakinra.    # Transplant History:  Etiology of Kidney Failure: IgA nephropathy and ANCA vasculitis  Tx: DDKT (K) and Liver Tx (K)  Transplant: 6/6/2023 (Liver), 6/6/2023 (Kidney)  Significant changes in immunosuppression: None  Significant transplant-related complications: CMV Viremia and DGF    Recommendations were communicated to the primary team via this note.    Seen and discussed with Dr. Rl Naik, NP  Transplant Nephrology  Contact information available via [facility] Paging/Directory    Interval History   Mr. Miranda creatinine is 0.94 (12/17 0537); Stable.  good urine output.  Other significant labs/tests/vitals: VSS  no events overnight.  no chest pain or shortness of breath.  moderate leg swelling.  no nausea and vomiting.  Bowel movements are improving.  no fever, sweats or chills.      Review of Systems   4 point ROS was obtained and negative except as noted in the Interval History.    MEDICATIONS:   allopurinol  300 mg Oral or Feeding Tube Daily    atorvastatin  40  mg Oral or Feeding Tube QPM    fluconazole  400 mg Intravenous Q24H    gabapentin  100 mg Oral or Feeding Tube BID    insulin aspart  1-12 Units Subcutaneous Q4H    iohexol  50 mL Oral Once    levothyroxine  88 mcg Oral or Feeding Tube Daily    lipids plant base  250 mL Intravenous Once per day on     metoprolol tartrate  50 mg Oral BID    metroNIDAZOLE  500 mg Intravenous Q12H    mycophenolic acid  540 mg Oral BID IS    pantoprazole  40 mg Intravenous BID    piperacillin-tazobactam  3.375 g Intravenous Q6H    predniSONE  5 mg Oral or Feeding Tube Daily    sodium chloride (PF)  10-40 mL Intracatheter Q8H    sodium chloride (PF)  3 mL Intracatheter Q8H    sulfamethoxazole-trimethoprim  1 tablet Oral or Feeding Tube Daily    tacrolimus  0.5 mg Oral BID IS    vancomycin  125 mg Oral 4x Daily      dextrose      heparin 2,250 Units/hr (23 0417)    parenteral nutrition - ADULT compounded formula      parenteral nutrition - ADULT compounded formula 45 mL/hr at 23       Physical Exam   Temp  Av.1  F (36.7  C)  Min: 96.7  F (35.9  C)  Max: 100.1  F (37.8  C)  Arterial Line BP  Min: 70/55  Max: 158/122  Arterial Line MAP (mmHg)  Av.2 mmHg  Min: 61 mmHg  Max: 207 mmHg      Pulse  Av  Min: 60  Max: 147 Resp  Av  Min: 9  Max: 36  FiO2 (%)  Av.9 %  Min: 40 %  Max: 50 %  SpO2  Av.9 %  Min: 85 %  Max: 100 %     /83 (BP Location: Left arm)   Pulse 80   Temp 97.8  F (36.6  C) (Oral)   Resp 16   Wt 104.6 kg (230 lb 11.2 oz)   SpO2 96%   BMI 36.13 kg/m     Date 23 07 - 23 0659   Shift 6388-7445 9081-0825 5836-8051 24 Hour Total   INTAKE   I.V. 545.28   545.28      160   Shift Total(mL/kg) 705.28(6.33)   705.28(6.33)   OUTPUT   Urine 225   225   Shift Total(mL/kg) 225(2.02)   225(2.02)   Weight (kg) 111.5 111.5 111.5 111.5      Admit Weight: 111.5 kg (245 lb 13 oz)     GENERAL APPEARANCE: alert and no  distress  HENT: mouth without ulcers or lesions, NG in place  RESP: lungs clear to auscultation from anterior - no rales, rhonchi or wheezes  CV: irregular rhythm and rate, no rub, no murmur  EDEMA: 1+ LE edema bilaterally  ABDOMEN: soft, nondistended, mild TTP, bowel sounds normal  MS: extremities normal - no gross deformities noted, no evidence of inflammation in joints, no muscle tenderness  SKIN: no rash  TX KIDNEY: normal  DIALYSIS ACCESS:  None    Data   All labs reviewed by me.  CMP  Recent Labs   Lab 12/17/23  1140 12/17/23  0805 12/17/23  0541 12/17/23  0537 12/16/23  0912 12/16/23  0742 12/15/23  0802 12/15/23  0547 12/14/23  0838 12/14/23  0504   NA  --   --   --  138  --  138  --  140  --  141   POTASSIUM  --   --   --  3.9  --  3.6  --  3.5  --  3.7   CHLORIDE  --   --   --  104  --  104  --  105  --  105   CO2  --   --   --  24  --  25  --  24  --  21*   ANIONGAP  --   --   --  10  --  9  --  11  --  15   * 153* 154* 150*   < > 185*   < > 183*   < > 169*   BUN  --   --   --  32.7*  --  33.8*  --  36.8*  --  42.2*   CR  --   --   --  0.94  --  0.96  --  0.95  --  1.02   GFRESTIMATED  --   --   --  89  --  87  --  88  --  81   NAZARIO  --   --   --  8.3*  --  8.2*  --  8.0*  --  8.4*   MAG  --   --   --  2.0  --  2.0  --  2.0  --  1.8   PHOS  --   --   --  2.9  --  3.2  --  2.9  --  2.5   PROTTOTAL  --   --   --  4.9*  --  4.8*  --  4.7*  --  5.5*   ALBUMIN  --   --   --  2.6*  --  2.5*  --  2.4*  --  2.8*   BILITOTAL  --   --   --  0.3  --  0.5  --  1.0  --  0.8   ALKPHOS  --   --   --  92  --  100  --  115  --  106   AST  --   --   --  16  --  17  --  29  --  36   ALT  --   --   --  22  --  25  --  36  --  23    < > = values in this interval not displayed.     CBC  Recent Labs   Lab 12/17/23  0537 12/16/23  0742 12/15/23  1533 12/15/23  0547   HGB 9.8* 10.5* 10.9* 12.1*   WBC 9.6 12.4* 16.8* 15.4*   RBC 3.46* 3.65* 3.79* 4.16*   HCT 32.3* 34.1* 35.3* 40.0   MCV 93 93 93 96   MCH 28.3 28.8 28.8 29.1    MCHC 30.3* 30.8* 30.9* 30.3*   RDW 17.2* 17.4* 17.3* 17.2*   * 119* 104* 109*     INR  Recent Labs   Lab 12/11/23  0327 12/10/23  1408   INR 1.92* 2.29*   PTT 40* 40*     ABG  Recent Labs   Lab 12/11/23  0826 12/10/23  2024 12/10/23  1731 12/10/23  1408   PH 7.42 7.36 7.12* 7.35   PCO2 36 39 65* 38   PO2 63* 90 88 87   HCO3 23 22 21 21   O2PER 40 40 50 50      Urine Studies  Recent Labs   Lab Test 12/09/23  1405 11/07/23  0753 09/27/23  1157 09/25/23  0759 09/20/23  1645 08/29/23  1023 08/04/23  0715   COLOR Yellow Yellow Dark Yellow* Yellow Yellow   < > Yellow   APPEARANCE Clear Clear Clear Clear Clear   < > Clear   URINEGLC Negative Negative 50* Negative 100*   < > Negative   URINEBILI Negative Small* Small* Small* Negative   < > Negative   URINEKETONE Negative Negative Negative Negative Negative   < > Negative   SG 1.022 1.015 1.029 1.020 1.015   < > 1.010   UBLD Negative Negative Negative Negative Negative   < > Negative   URINEPH 5.0 7.0 6.0 7.0 7.0   < > 6.5   PROTEIN 10* Trace* 70* 30* Negative   < > Negative   UROBILINOGEN  --  0.2  --  0.2 0.2  --  0.2   NITRITE Negative Negative Negative Negative Negative   < > Negative   LEUKEST Negative Trace* Negative Negative Negative   < > Small*   RBCU 2 0-2 1 None Seen  --   --  0-2   WBCU 6* 10-25* 7* None Seen  --   --  0-5    < > = values in this interval not displayed.     No lab results found.  PTH  Recent Labs   Lab Test 09/20/23  1611 08/01/23  0924 05/19/23  0956   PTHI 24 45 67*     Iron Studies  Recent Labs   Lab Test 11/21/23  0742 11/07/23  0753 10/05/23  0752 09/05/23  0702 08/07/23  0726 08/01/23  0924 11/22/22  0848   IRON 60* 53* 83 32* 28* 23* 68    226* 241 258 244 231* 219*   IRONSAT 22 23 34 12* 11* 10* 31   HOLA 204 341 919* 485* 440* 506* 429*       IMAGING:  All imaging studies reviewed by me.

## 2023-12-17 NOTE — PROGRESS NOTES
/65 (BP Location: Left arm)   Pulse 56   Temp 97.9  F (36.6  C) (Oral)   Resp 16   Wt 107.7 kg (237 lb 6.4 oz)   SpO2 97%   BMI 37.18 kg/m      AVSS on RA. Pain 2-4 controlled w/ PRN tylenol. Clear liquid diet, occasional bloating, encouraged to drink slowly and little bits at a time as NG to suction is no longer in place. BG Q4hr (127-140), continuous TPN @45. Voiding in urinal, able to make it to the commode for bowel movements, loose bowel movements.     Heparin Xa 0000 draw was 0.38, 0600 recheck 0.53, second in range, next draw AM labs 12/18. MAE drains put out 135ml from all three overnight.

## 2023-12-17 NOTE — PROVIDER NOTIFICATION
[id number] Casey Quinones   can u put a recheck on hemoglobin please, family at bedside concerns about a downward trend in hgb !Thx  [phone] RN 7A

## 2023-12-17 NOTE — PLAN OF CARE
Vitals: stable room air.   Blood glucose: q4 hrs : 153. 191. 126.   Pain/nausea: tylenol x 1 for generalized pain.   Diet: full liquid.   Lines: PIVL saline locked. Central line chest infusing SR Heparin 22.5 ml.hr and TPN infusing @ 45. Xa therapeutic so redraw is tomorrow morning.   : good UOP. Voids in urinal.   GI: x 2.   Drains: MAE x 3 OP adequate, dressing all changed.    Skin: incision dressing packed and changed.   Mobility: up x 1 x walker.   Walked with PT, sat up in recliner.

## 2023-12-17 NOTE — PROGRESS NOTES
"Subjective    CHIEF COMPLAINT/REASON FOR CONSULT  Reason for consult: Gout flare     Subjective   Mr. Quinones is a 68 year old male admitted for redo mesh repair for umbilical hernia underwent surgery on 12/10, s/p repair for small bowel perforation and abdominal wash out. He had recurrence of joint pain consistent with gout flare that has responded very well to anakinra.    He did very after getting 2 doses of anakinra and he was able to walk today, he feels almost back to his baseline    Objective   VITAL SIGNS  Temp:  [97.8  F (36.6  C)-98  F (36.7  C)] 97.8  F (36.6  C)  Pulse:  [56-87] 80  Resp:  [16] 16  BP: (115-141)/(65-94) 138/83  SpO2:  [93 %-97 %] 96 %  Admission Weight: 111.5 kg (245 lb 13 oz)  Current Weight: 104.6 kg (230 lb 11.2 oz)    PHYSICAL EXAMINATION  Physical Exam  General: Alert, oriented, appropriate affect, no apparent distress.  Joints:  No synovitis in the MCPs bilaterally, elbows, knees.   Lab:     Lab Results   Component Value Date    WBC 9.6 12/17/2023    HGB 9.8 (L) 12/17/2023    HCT 32.3 (L) 12/17/2023    MCV 93 12/17/2023     (L) 12/17/2023   ,No components found for: \"SEDRATE\", No results found for: \"CRP\", No results found for: \"CREATININE\",   Lab Results   Component Value Date    ALT 22 12/17/2023    AST 16 12/17/2023     (H) 08/04/2023    ALKPHOS 92 12/17/2023     No results found for: \"CRP\"  Lab Results   Component Value Date/Time    HGB 9.8 (L) 12/17/2023 05:37 AM    HGB 10.5 (L) 12/16/2023 07:42 AM    HGB 10.9 (L) 12/15/2023 03:33 PM    MCV 93 12/17/2023 05:37 AM    MCV 93 12/16/2023 07:42 AM    MCV 93 12/15/2023 03:33 PM    WBC 9.6 12/17/2023 05:37 AM    WBC 12.4 (H) 12/16/2023 07:42 AM    WBC 16.8 (H) 12/15/2023 03:33 PM     (L) 12/17/2023 05:37 AM     (L) 12/16/2023 07:42 AM     (L) 12/15/2023 03:33 PM       Assessment & Plan     # Acute gout flare, resolved  # s/p Liver and renal tranplant on chronic prednisone 5 mg daily  # chronic " immunosuppression for transplant purposes (tacrolimus and Mycophenolate)  # History of psoriatic arthritis  # History of ANCA vasculitis     Casey is admitted for redo mesh and underwent surgery on 12/10 and he had gout flare that has responded very well to 2 doses of Anakinra and he is almost back to his baseline.  There is no evidence of active arthritis today on examination    We discussed given his co morbidities and recent surgery as well as infection especially after the improved noted then we would hold on further doses of anakinra for now. If he develops new or recurrence of his joint symptoms then please feel free to consult rheumatology and consideration of restarting anakinra can be considered. The patient and his wife are in agreement with this plan    Recommendations   1- Discontinue Anakinra given resolution of gout flare  2- Monitor symptoms and re consult rheumatology if recurrence occurs     Medical Decision Making       25 MINUTES SPENT BY ME on the date of service doing chart review, history, exam, documentation & further activities per the note.        Yolie Brewer MD  Rheumatology attending

## 2023-12-18 ENCOUNTER — APPOINTMENT (OUTPATIENT)
Dept: OCCUPATIONAL THERAPY | Facility: CLINIC | Age: 68
End: 2023-12-18
Payer: COMMERCIAL

## 2023-12-18 ENCOUNTER — APPOINTMENT (OUTPATIENT)
Dept: PHYSICAL THERAPY | Facility: CLINIC | Age: 68
End: 2023-12-18
Payer: COMMERCIAL

## 2023-12-18 LAB
ALBUMIN SERPL BCG-MCNC: 2.7 G/DL (ref 3.5–5.2)
ALBUMIN UR-MCNC: NEGATIVE MG/DL
ALP SERPL-CCNC: 96 U/L (ref 40–150)
ALT SERPL W P-5'-P-CCNC: 21 U/L (ref 0–70)
ANION GAP SERPL CALCULATED.3IONS-SCNC: 10 MMOL/L (ref 7–15)
APPEARANCE UR: CLEAR
AST SERPL W P-5'-P-CCNC: 22 U/L (ref 0–45)
BACTERIA #/AREA URNS HPF: ABNORMAL /HPF
BILIRUB SERPL-MCNC: 0.4 MG/DL
BILIRUB UR QL STRIP: NEGATIVE
BUN SERPL-MCNC: 31 MG/DL (ref 8–23)
CALCIUM SERPL-MCNC: 8.5 MG/DL (ref 8.8–10.2)
CHLORIDE SERPL-SCNC: 106 MMOL/L (ref 98–107)
COLOR UR AUTO: ABNORMAL
CREAT SERPL-MCNC: 1 MG/DL (ref 0.67–1.17)
DEPRECATED HCO3 PLAS-SCNC: 21 MMOL/L (ref 22–29)
EGFRCR SERPLBLD CKD-EPI 2021: 83 ML/MIN/1.73M2
ERYTHROCYTE [DISTWIDTH] IN BLOOD BY AUTOMATED COUNT: 17 % (ref 10–15)
GLUCOSE BLDC GLUCOMTR-MCNC: 128 MG/DL (ref 70–99)
GLUCOSE BLDC GLUCOMTR-MCNC: 128 MG/DL (ref 70–99)
GLUCOSE BLDC GLUCOMTR-MCNC: 142 MG/DL (ref 70–99)
GLUCOSE BLDC GLUCOMTR-MCNC: 153 MG/DL (ref 70–99)
GLUCOSE SERPL-MCNC: 131 MG/DL (ref 70–99)
GLUCOSE UR STRIP-MCNC: NEGATIVE MG/DL
HCT VFR BLD AUTO: 38.1 % (ref 40–53)
HGB BLD-MCNC: 11.4 G/DL (ref 13.3–17.7)
HGB UR QL STRIP: NEGATIVE
KETONES UR STRIP-MCNC: NEGATIVE MG/DL
LEUKOCYTE ESTERASE UR QL STRIP: NEGATIVE
MAGNESIUM SERPL-MCNC: 1.8 MG/DL (ref 1.7–2.3)
MCH RBC QN AUTO: 28.7 PG (ref 26.5–33)
MCHC RBC AUTO-ENTMCNC: 29.9 G/DL (ref 31.5–36.5)
MCV RBC AUTO: 96 FL (ref 78–100)
MUCOUS THREADS #/AREA URNS LPF: PRESENT /LPF
NITRATE UR QL: NEGATIVE
PH UR STRIP: 7 [PH] (ref 5–7)
PHOSPHATE SERPL-MCNC: 3 MG/DL (ref 2.5–4.5)
PLATELET # BLD AUTO: 174 10E3/UL (ref 150–450)
POTASSIUM SERPL-SCNC: 4.8 MMOL/L (ref 3.4–5.3)
PROT SERPL-MCNC: 5.2 G/DL (ref 6.4–8.3)
RBC # BLD AUTO: 3.97 10E6/UL (ref 4.4–5.9)
RBC URINE: 0 /HPF
SODIUM SERPL-SCNC: 137 MMOL/L (ref 135–145)
SP GR UR STRIP: 1 (ref 1–1.03)
TACROLIMUS BLD-MCNC: 9 UG/L (ref 5–15)
TME LAST DOSE: NORMAL H
TME LAST DOSE: NORMAL H
TRIGL SERPL-MCNC: 135 MG/DL
UFH PPP CHRO-ACNC: 0.19 IU/ML
UFH PPP CHRO-ACNC: 0.43 IU/ML
UFH PPP CHRO-ACNC: 0.74 IU/ML
UROBILINOGEN UR STRIP-MCNC: NORMAL MG/DL
WBC # BLD AUTO: 9.6 10E3/UL (ref 4–11)
WBC URINE: 2 /HPF

## 2023-12-18 PROCEDURE — C9113 INJ PANTOPRAZOLE SODIUM, VIA: HCPCS

## 2023-12-18 PROCEDURE — 80053 COMPREHEN METABOLIC PANEL: CPT | Performed by: STUDENT IN AN ORGANIZED HEALTH CARE EDUCATION/TRAINING PROGRAM

## 2023-12-18 PROCEDURE — 250N000013 HC RX MED GY IP 250 OP 250 PS 637

## 2023-12-18 PROCEDURE — 81001 URINALYSIS AUTO W/SCOPE: CPT | Performed by: NURSE PRACTITIONER

## 2023-12-18 PROCEDURE — 250N000012 HC RX MED GY IP 250 OP 636 PS 637

## 2023-12-18 PROCEDURE — 999N000248 HC STATISTIC IV INSERT WITH US BY RN

## 2023-12-18 PROCEDURE — 97530 THERAPEUTIC ACTIVITIES: CPT | Mod: GP

## 2023-12-18 PROCEDURE — 250N000011 HC RX IP 250 OP 636: Performed by: NURSE PRACTITIONER

## 2023-12-18 PROCEDURE — 250N000011 HC RX IP 250 OP 636: Mod: JZ

## 2023-12-18 PROCEDURE — 250N000011 HC RX IP 250 OP 636

## 2023-12-18 PROCEDURE — 250N000009 HC RX 250

## 2023-12-18 PROCEDURE — 250N000009 HC RX 250: Mod: JZ | Performed by: SURGERY

## 2023-12-18 PROCEDURE — 250N000012 HC RX MED GY IP 250 OP 636 PS 637: Performed by: NURSE PRACTITIONER

## 2023-12-18 PROCEDURE — 97116 GAIT TRAINING THERAPY: CPT | Mod: GP

## 2023-12-18 PROCEDURE — 36592 COLLECT BLOOD FROM PICC: CPT | Performed by: SURGERY

## 2023-12-18 PROCEDURE — 250N000011 HC RX IP 250 OP 636: Mod: JZ | Performed by: SURGERY

## 2023-12-18 PROCEDURE — 99233 SBSQ HOSP IP/OBS HIGH 50: CPT | Mod: 24 | Performed by: INTERNAL MEDICINE

## 2023-12-18 PROCEDURE — 36415 COLL VENOUS BLD VENIPUNCTURE: CPT | Performed by: SURGERY

## 2023-12-18 PROCEDURE — 36591 DRAW BLOOD OFF VENOUS DEVICE: CPT | Performed by: SURGERY

## 2023-12-18 PROCEDURE — 80197 ASSAY OF TACROLIMUS: CPT | Performed by: NURSE PRACTITIONER

## 2023-12-18 PROCEDURE — 83735 ASSAY OF MAGNESIUM: CPT | Performed by: NURSE PRACTITIONER

## 2023-12-18 PROCEDURE — 85520 HEPARIN ASSAY: CPT | Performed by: SURGERY

## 2023-12-18 PROCEDURE — B4185 PARENTERAL SOL 10 GM LIPIDS: HCPCS | Mod: JZ | Performed by: SURGERY

## 2023-12-18 PROCEDURE — 84478 ASSAY OF TRIGLYCERIDES: CPT | Performed by: SURGERY

## 2023-12-18 PROCEDURE — 250N000011 HC RX IP 250 OP 636: Performed by: PHYSICIAN ASSISTANT

## 2023-12-18 PROCEDURE — 85027 COMPLETE CBC AUTOMATED: CPT | Performed by: STUDENT IN AN ORGANIZED HEALTH CARE EDUCATION/TRAINING PROGRAM

## 2023-12-18 PROCEDURE — 84100 ASSAY OF PHOSPHORUS: CPT | Performed by: NURSE PRACTITIONER

## 2023-12-18 PROCEDURE — 36415 COLL VENOUS BLD VENIPUNCTURE: CPT | Performed by: NURSE PRACTITIONER

## 2023-12-18 PROCEDURE — 120N000011 HC R&B TRANSPLANT UMMC

## 2023-12-18 PROCEDURE — 250N000011 HC RX IP 250 OP 636: Mod: JZ | Performed by: NURSE PRACTITIONER

## 2023-12-18 PROCEDURE — 97535 SELF CARE MNGMENT TRAINING: CPT | Mod: GO

## 2023-12-18 RX ORDER — METHOCARBAMOL 500 MG/1
500 TABLET, FILM COATED ORAL 4 TIMES DAILY PRN
Status: DISCONTINUED | OUTPATIENT
Start: 2023-12-18 | End: 2023-12-20 | Stop reason: HOSPADM

## 2023-12-18 RX ORDER — FUROSEMIDE 10 MG/ML
40 INJECTION INTRAMUSCULAR; INTRAVENOUS ONCE
Status: COMPLETED | OUTPATIENT
Start: 2023-12-18 | End: 2023-12-18

## 2023-12-18 RX ORDER — RAMELTEON 8 MG/1
8 TABLET ORAL
Status: DISCONTINUED | OUTPATIENT
Start: 2023-12-18 | End: 2023-12-20 | Stop reason: HOSPADM

## 2023-12-18 RX ADMIN — GABAPENTIN 100 MG: 100 CAPSULE ORAL at 07:34

## 2023-12-18 RX ADMIN — OLIVE OIL AND SOYBEAN OIL 250 ML: 16; 4 INJECTION, EMULSION INTRAVENOUS at 20:39

## 2023-12-18 RX ADMIN — ALLOPURINOL 300 MG: 300 TABLET ORAL at 07:34

## 2023-12-18 RX ADMIN — PIPERACILLIN AND TAZOBACTAM 3.38 G: 3; .375 INJECTION, POWDER, LYOPHILIZED, FOR SOLUTION INTRAVENOUS at 06:00

## 2023-12-18 RX ADMIN — TACROLIMUS 0.5 MG: 0.5 CAPSULE ORAL at 18:12

## 2023-12-18 RX ADMIN — MYCOPHENOLIC ACID 540 MG: 360 TABLET, DELAYED RELEASE ORAL at 07:34

## 2023-12-18 RX ADMIN — METOPROLOL TARTRATE 50 MG: 50 TABLET, FILM COATED ORAL at 20:12

## 2023-12-18 RX ADMIN — GABAPENTIN 100 MG: 100 CAPSULE ORAL at 20:12

## 2023-12-18 RX ADMIN — FLUCONAZOLE IN SODIUM CHLORIDE 400 MG: 2 INJECTION, SOLUTION INTRAVENOUS at 11:51

## 2023-12-18 RX ADMIN — PIPERACILLIN AND TAZOBACTAM 3.38 G: 3; .375 INJECTION, POWDER, LYOPHILIZED, FOR SOLUTION INTRAVENOUS at 01:28

## 2023-12-18 RX ADMIN — VANCOMYCIN HYDROCHLORIDE 125 MG: 125 CAPSULE ORAL at 07:34

## 2023-12-18 RX ADMIN — PANTOPRAZOLE SODIUM 40 MG: 40 INJECTION, POWDER, FOR SOLUTION INTRAVENOUS at 20:12

## 2023-12-18 RX ADMIN — METOPROLOL TARTRATE 50 MG: 50 TABLET, FILM COATED ORAL at 07:34

## 2023-12-18 RX ADMIN — SULFAMETHOXAZOLE AND TRIMETHOPRIM 1 TABLET: 400; 80 TABLET ORAL at 07:34

## 2023-12-18 RX ADMIN — ONDANSETRON 4 MG: 2 INJECTION INTRAMUSCULAR; INTRAVENOUS at 06:54

## 2023-12-18 RX ADMIN — TACROLIMUS 0.5 MG: 0.5 CAPSULE ORAL at 07:33

## 2023-12-18 RX ADMIN — PANTOPRAZOLE SODIUM 40 MG: 40 INJECTION, POWDER, FOR SOLUTION INTRAVENOUS at 07:33

## 2023-12-18 RX ADMIN — VANCOMYCIN HYDROCHLORIDE 125 MG: 125 CAPSULE ORAL at 11:51

## 2023-12-18 RX ADMIN — POTASSIUM PHOSPHATE, MONOBASIC POTASSIUM PHOSPHATE, DIBASIC: 224; 236 INJECTION, SOLUTION, CONCENTRATE INTRAVENOUS at 20:39

## 2023-12-18 RX ADMIN — PREDNISONE 5 MG: 5 TABLET ORAL at 07:34

## 2023-12-18 RX ADMIN — MYCOPHENOLIC ACID 540 MG: 360 TABLET, DELAYED RELEASE ORAL at 18:12

## 2023-12-18 RX ADMIN — ATORVASTATIN CALCIUM 40 MG: 40 TABLET, FILM COATED ORAL at 20:12

## 2023-12-18 RX ADMIN — METRONIDAZOLE 500 MG: 500 INJECTION, SOLUTION INTRAVENOUS at 20:12

## 2023-12-18 RX ADMIN — METRONIDAZOLE 500 MG: 500 INJECTION, SOLUTION INTRAVENOUS at 07:33

## 2023-12-18 RX ADMIN — VANCOMYCIN HYDROCHLORIDE 125 MG: 125 CAPSULE ORAL at 20:12

## 2023-12-18 RX ADMIN — LEVOTHYROXINE SODIUM 88 MCG: 88 TABLET ORAL at 07:33

## 2023-12-18 RX ADMIN — HEPARIN SODIUM 2400 UNITS/HR: 10000 INJECTION, SOLUTION INTRAVENOUS at 13:32

## 2023-12-18 RX ADMIN — HEPARIN SODIUM 2250 UNITS/HR: 10000 INJECTION, SOLUTION INTRAVENOUS at 04:12

## 2023-12-18 RX ADMIN — VANCOMYCIN HYDROCHLORIDE 125 MG: 125 CAPSULE ORAL at 15:27

## 2023-12-18 RX ADMIN — FUROSEMIDE 40 MG: 10 INJECTION, SOLUTION INTRAMUSCULAR; INTRAVENOUS at 15:27

## 2023-12-18 ASSESSMENT — ACTIVITIES OF DAILY LIVING (ADL)
ADLS_ACUITY_SCORE: 28

## 2023-12-18 NOTE — PROGRESS NOTES
BP (!) 148/69 (BP Location: Left arm)   Pulse 86   Temp 98.1  F (36.7  C) (Oral)   Resp 18   Wt 104.6 kg (230 lb 11.2 oz)   SpO2 97%   BMI 36.13 kg/m      2300 - 0730    AVSS on RA, no insulin needed overnight BG Q4hr. Full liquid diet. 3 MAE drains, expected to discharge w/ at least 1 MAE. Abd wound being packed and changed BID. Voids in urinal, makes it to commode for BM's. Assist x1. 2 PIVs and R chest port. Heparin @24mL d/t hep Xa AM value 0.18, increased from 22.5mL, hep bolus of 3000 units once verified by pharmacy.     C/o nausea @0655, IV zofran per pt request.

## 2023-12-18 NOTE — PROGRESS NOTES
CLINICAL NUTRITION SERVICES - BRIEF NOTE     Nutrition Prescription    RECOMMENDATIONS FOR MDs/PROVIDERS TO ORDER:  Recommend discontinue TPN once patient tolerating at least 50% of regular meals and/or ~1000 kcal, 60 grams protein per calorie counts.      Recommendations already ordered by Registered Dietitian (RD):  Continue PN for now as ordered    Start calorie counts (12/18-12/20)       EVALUATION OF THE PROGRESS TOWARD GOALS   Diet: Regular (advanced today)  Nutrition Support: TPN providing 1674 kcal (22 kcal/kg), 1.6g/kg protein daily  (TPN started 12/10, adv to goal dex on 12/13)    Intake: Pt reports having some milk, ice cream and coffee yesterday.  Is anxious to try regular foods and hopeful his diet will advance today.       INTERVENTIONS  Discussed diet/TPN plan with primary team, PharmD today.  Plan for diet advancement with TPN until meeting goals.  Start calorie counts today.  Discussed recommendations for softer meals/protein sources with diet advancement.  Pt declined any oral supplements at this time.     Monitoring/Evaluation  Progress toward goals will be monitored and evaluated per protocol.     Lizbeth Powell, MS, RD, LD, CCTD, CNSC  7A and 7B beds [phone], pager [phone]  Weekend pager [phone]

## 2023-12-18 NOTE — PLAN OF CARE
VS: /87 (BP Location: Left arm, Patient Position: Sitting, Cuff Size: Adult Large)   Pulse 67   Temp 98  F (36.7  C) (Oral)   Resp 18   Wt 104.6 kg (230 lb 11.2 oz)   SpO2 97%   BMI 36.13 kg/m      Cares: 1900 - 2300      Neuro: Aox4   VS: VSS   Cardiac: WDL   Respiratory: WDL on RA  GI/: voiding adequately w/out issues, LBM 12-17  Skin: midline abdominal incision - dressing C/D/I, scattered bruising   Diet: full liquid   Labs: pending AM labs   BG: Q4H (105)  LDA: PIV x2, Right chest port - heparin @ 22.5mL/hr    Mobility: Ax1 w/ walker   Pain/Nausea: denies pain or nausea   PRN medications: none given   Plan of Care: continue with current POC and update MD with any changes

## 2023-12-18 NOTE — PROGRESS NOTES
[facility]   Transplant Nephrology Progress Note  Date of Admission:  12/8/2023  Today's Date: 12/18/2023    Recommendations:  - No acute indications for dialysis.  - Would continue with diuresis with furosemide 40 mg IV daily.  - Agree with treatment for C. diff with oral vancomycin 125 mg qid x 14 days, then 125 mg bid x 14 days and reassess.  - Would consider 2-3 doses of iron ferrlecit 200 mg IV daily for anemia with mild iron deficiency when out of infection risk.    Assessment & Plan   # DDKT (SLK): Stable creatinine at baseline.  Good urine output.  No acute indications for dialysis.   - PATI felt secondary to hypotension and sepsis with probable ATN.    - Baseline Creatinine: ~ 0.8-1.0   - Proteinuria: Normal (<0.2 grams)   - Date DSA Last Checked: Aug/2023      Latest DSA: No   - BK Viremia: No   - Kidney Tx Biopsy: Jun 20, 2023; Result: No diagnostic evidence of acute rejection.   Focal acute tubular necrosis.  Mild arterial and arteriolar sclerosis.    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  Transaminases Stable.  Followed by Transplant Surgery.     # Immunosuppression Prior to Admission: Tacrolimus immediate release (goal 6-8), Mycophenolic acid (dose 540 mg every 12 hours), and Prednisone (dose 5 mg daily)   - Present Immunosuppression: Tacrolimus immediate release (goal 6-8), Mycophenolate mofetil (dose 500 mg every 12 hours), and Methylprednisolone (dose 4 mg daily)   - Patient is in an immunosuppressed state and will continue to monitor for efficacy and toxicity of immunosuppression medications.   - Changes: Not at this time     # Infection Prophylaxis:   - PJP: Sulfa/TMP (Bactrim)  - CMV: None, prophylaxis completed; CMV IgG Ab positive  - Fungal: Fluconazole (Diflucan)    # Hypertension: Controlled;  Goal BP: < 140/90 (Hospitalization goal)   - Volume status: Mildly  hypervolemic  EDW ~ 225 lbs   - Changes: Yes - Would continue furosemide 40 mg IV daily.    # Elevated Blood Glucose: Glucose generally running ~ 100-150s   - On insulin.   - Management as per primary team.    # Anemia in Chronic Renal Disease: Hgb: Trend up      FEDERICO: No   - Iron studies: Low iron saturation; Would consider course of IV iron when patient is out of infection risk concern.    # Mineral Bone Disorder:   - Secondary renal hyperparathyroidism; PTH level: Normal (15-65 pg/ml)        On treatment: None  - Vitamin D; level: Normal        On supplement: No  - Calcium; level: Normal when corrected for low serum albumin        On supplement: No  - Phosphorus; level: Normal        On supplement: No    # Electrolytes:   - Potassium; level: Normal        On supplement: No  - Magnesium; level: Normal        On supplement: No  - Bicarbonate; level: Low        On supplement: No    # Umbilical Hernia Mesh Repair: Patient is s/p scheduled redo mesh repair of umbilical hernia and minimal DHEERAJ of small bowel 12/7/23. Patient then represented 12/8/23 from home with severe LLQ abdominal pain and N/V 12/8/23.  Back to OR 12/10 and found to have bowel leak and repaired.  On broad spectrum-antibiotics and antifungal.    # CAD, s/p PCI: Asymptomatic.     # Paroxysmal A-Fib with Episodic RVR: Patient had episode of unresponsiveness 12/8/23- possible seizure vs apnea. Extensive workup with evidence of A fib w RVR, s/p PO dose of metoprolol.  Received Digoxin 500 mcg given IV once and subsequent dose of 250mcg on 12/9.  On apixaban PTA.  A. fib felt to be driven by septic shock. Started on amiodarone gtt on 12/9.  Presently in A. fib, but rate controlled.  Anticoagulation restarted on heparin gtt.    # Obesity, Class II (BMI = 39.7): Trend up in weight, likely some volume related following surgery.   - Once patient heals and recovers from surgery, would recommend working on weight loss for overall health by increasing exercise  and watching caloric intake.    # Clostridium difficile Colitis: Patient with h/o C. diff and likely colonized, but has had worsening diarrhea and on broad-spectrum antibiotics making him susceptible.   - Agree with plan to start oral vancomycin and would treat with 125 mg qid x 14 days, then 125 mg bid x 14 days and reassess.    # Peripheral Neuropathy: Stable on gabapentin.     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  Previously treated with rituximab x 2.     # Hx of Gout: PTA on prednisone 5mg daily and allopurinol 300mg daily.  Also takes anakinra 100 mg with gout symptoms.  Had gout flare, but symptoms improved.  Allopurinol now restarted and received anakinra.  Rheumatology following.    # Transplant History:  Etiology of Kidney Failure: IgA nephropathy and ANCA vasculitis  Tx: DDKT (SLK) and Liver Tx (SLK)  Transplant: 6/6/2023 (Liver), 6/6/2023 (Kidney)  Significant changes in immunosuppression: None  Significant transplant-related complications: CMV Viremia and DGF    Recommendations were communicated to the primary team via this note.    Jeovany Scott MD  Transplant Nephrology  Contact information available via [facility] Paging/Directory    Interval History   Mr. Miranda creatinine is 1.00 (12/18 0543); Stable.  Good urine output.  Normal transaminases.  Other significant labs/tests/vitals: Stable electrolytes.  Trend up in hemoglobin.  Trend up in platelets.  No new events overnight.  Patient reports overall feeling better.  Decreased left knee pain.  A little back pain.  No chest pain or shortness of breath.  Some leg swelling.  No nausea and vomiting.  Bowel movements are watery to loose.  No fever, sweats or chills.    Review of Systems   4 point ROS was obtained and negative except as noted in the Interval History.    MEDICATIONS:   allopurinol  300 mg Oral or Feeding Tube Daily    atorvastatin  40 mg Oral or Feeding Tube QPM    fluconazole  400 mg Intravenous Q24H    gabapentin  100 mg Oral  or Feeding Tube BID    insulin aspart  1-12 Units Subcutaneous Q4H    levothyroxine  88 mcg Oral or Feeding Tube Daily    lipids plant base  250 mL Intravenous Once per day on     metoprolol tartrate  50 mg Oral BID    metroNIDAZOLE  500 mg Intravenous Q12H    mycophenolic acid  540 mg Oral BID IS    pantoprazole  40 mg Intravenous BID    predniSONE  5 mg Oral or Feeding Tube Daily    sodium chloride (PF)  10-40 mL Intracatheter Q8H    sodium chloride (PF)  3 mL Intracatheter Q8H    sulfamethoxazole-trimethoprim  1 tablet Oral or Feeding Tube Daily    tacrolimus  0.5 mg Oral BID IS    vancomycin  125 mg Oral 4x Daily      dextrose      heparin 2,400 Units/hr (23)    parenteral nutrition - ADULT compounded formula 45 mL/hr at 23       Physical Exam   Temp  Av.1  F (36.7  C)  Min: 96.7  F (35.9  C)  Max: 100.1  F (37.8  C)  Arterial Line BP  Min: 70/55  Max: 158/122  Arterial Line MAP (mmHg)  Av.2 mmHg  Min: 61 mmHg  Max: 207 mmHg      Pulse  Av  Min: 60  Max: 147 Resp  Av  Min: 9  Max: 36  FiO2 (%)  Av.9 %  Min: 40 %  Max: 50 %  SpO2  Av.9 %  Min: 85 %  Max: 100 %     BP (!) 148/69 (BP Location: Left arm)   Pulse 86   Temp 98.1  F (36.7  C) (Oral)   Resp 18   Wt 104.6 kg (230 lb 11.2 oz)   SpO2 97%   BMI 36.13 kg/m     Date 23 07 - 23 0659   Shift [id number] 3066-9300 6705-5046 24 Hour Total   INTAKE   I.V. 545.28   545.28      160   Shift Total(mL/kg) 705.28(6.33)   705.28(6.33)   OUTPUT   Urine 225   225   Shift Total(mL/kg) 225(2.02)   225(2.02)   Weight (kg) 111.5 111.5 111.5 111.5      Admit Weight: 111.5 kg (245 lb 13 oz)     GENERAL APPEARANCE: alert and no distress  HENT: mouth without ulcers or lesions, NG in place  RESP: lungs clear to auscultation from anterior - no rales, rhonchi or wheezes  CV: irregular rhythm and rate, no rub, no murmur  EDEMA: 1+ LE edema  bilaterally  ABDOMEN: soft, nondistended, mild TTP, bowel sounds normal  MS: extremities normal - no gross deformities noted, no evidence of inflammation in joints, no muscle tenderness  SKIN: no rash  TX KIDNEY: normal  DIALYSIS ACCESS:  None    Data   All labs reviewed by me.  CMP  Recent Labs   Lab 12/18/23  0742 12/18/23  0543 12/18/23  0422 12/18/23  0127 12/17/23  0541 12/17/23  0537 12/16/23  0912 12/16/23  0742 12/15/23  0802 12/15/23  0547   NA  --  137  --   --   --  138  --  138  --  140   POTASSIUM  --  4.8  --   --   --  3.9  --  3.6  --  3.5   CHLORIDE  --  106  --   --   --  104  --  104  --  105   CO2  --  21*  --   --   --  24  --  25  --  24   ANIONGAP  --  10  --   --   --  10  --  9  --  11   * 131* 128* 128*   < > 150*   < > 185*   < > 183*   BUN  --  31.0*  --   --   --  32.7*  --  33.8*  --  36.8*   CR  --  1.00  --   --   --  0.94  --  0.96  --  0.95   GFRESTIMATED  --  83  --   --   --  89  --  87  --  88   NAZARIO  --  8.5*  --   --   --  8.3*  --  8.2*  --  8.0*   MAG  --  1.8  --   --   --  2.0  --  2.0  --  2.0   PHOS  --  3.0  --   --   --  2.9  --  3.2  --  2.9   PROTTOTAL  --  5.2*  --   --   --  4.9*  --  4.8*  --  4.7*   ALBUMIN  --  2.7*  --   --   --  2.6*  --  2.5*  --  2.4*   BILITOTAL  --  0.4  --   --   --  0.3  --  0.5  --  1.0   ALKPHOS  --  96  --   --   --  92  --  100  --  115   AST  --  22  --   --   --  16  --  17  --  29   ALT  --  21  --   --   --  22  --  25  --  36    < > = values in this interval not displayed.     CBC  Recent Labs   Lab 12/18/23  0543 12/17/23  1724 12/17/23  0537 12/16/23  0742 12/15/23  1533   HGB 11.4* 10.4* 9.8* 10.5* 10.9*   WBC 9.6  --  9.6 12.4* 16.8*   RBC 3.97*  --  3.46* 3.65* 3.79*   HCT 38.1*  --  32.3* 34.1* 35.3*   MCV 96  --  93 93 93   MCH 28.7  --  28.3 28.8 28.8   MCHC 29.9*  --  30.3* 30.8* 30.9*   RDW 17.0*  --  17.2* 17.4* 17.3*     --  148* 119* 104*     INR  No lab results found in last 7 days.    ABG  No lab  results found in last 7 days.     Urine Studies  Recent Labs   Lab Test 12/09/23  1405 11/07/23  0753 09/27/23  1157 09/25/23  0759 09/20/23  1645 08/29/23  1023 08/04/23  0715   COLOR Yellow Yellow Dark Yellow* Yellow Yellow   < > Yellow   APPEARANCE Clear Clear Clear Clear Clear   < > Clear   URINEGLC Negative Negative 50* Negative 100*   < > Negative   URINEBILI Negative Small* Small* Small* Negative   < > Negative   URINEKETONE Negative Negative Negative Negative Negative   < > Negative   SG 1.022 1.015 1.029 1.020 1.015   < > 1.010   UBLD Negative Negative Negative Negative Negative   < > Negative   URINEPH 5.0 7.0 6.0 7.0 7.0   < > 6.5   PROTEIN 10* Trace* 70* 30* Negative   < > Negative   UROBILINOGEN  --  0.2  --  0.2 0.2  --  0.2   NITRITE Negative Negative Negative Negative Negative   < > Negative   LEUKEST Negative Trace* Negative Negative Negative   < > Small*   RBCU 2 0-2 1 None Seen  --   --  0-2   WBCU 6* 10-25* 7* None Seen  --   --  0-5    < > = values in this interval not displayed.     No lab results found.  PTH  Recent Labs   Lab Test 09/20/23  1611 08/01/23  0924 05/19/23  0956   PTHI 24 45 67*     Iron Studies  Recent Labs   Lab Test 11/21/23  0742 11/07/23  0753 10/05/23  0752 09/05/23  0702 08/07/23  0726 08/01/23  0924 11/22/22  0848   IRON 60* 53* 83 32* 28* 23* 68    226* 241 258 244 231* 219*   IRONSAT 22 23 34 12* 11* 10* 31   HOLA 204 341 919* 485* 440* 506* 429*       IMAGING:  All imaging studies reviewed by me.

## 2023-12-18 NOTE — PROGRESS NOTES
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 He underwent a redo mesh repair of umbilical hernia and minimal DHEERAJ of small bowel 12/7/23 with Dr. Clifton. Presented from home on POD1 after LLQ pain. Now s/p RTOR on 12/10 with repaired enterotomy and abdominal wash out.    Brief plans:   - Advance diet to Regular   - Remove one MAE today   - Stop Zosyn, continue Vanco/Flagyl/Diflucan   - Restart DOAC once MAE drains removed    s/p 12/7/23 Redo mesh repair of umbilical hernia and minimal DHEERAJ of small bowel; RTOR 12/10/23 Ex lap, repair of SB perforation, abdominal wash out:   - Advance to Regular today   - Continue TPN until able to meet caloric needs   - CT scan 12/15 without leak   - Stop Zosyn, continue Diflucan x 4 weeks   - Follow intra-op cultures, + yeast (saccharomyces cerevisiae)     Hx SLK 6/6/23:  Liver: LFTs WNL.   Kidney; PATI secondary to sepsis/SIRS: Cr now back to baseline ~0.8-1. Good UOP.    - Appreciate Transplant nephrology recs.      Immunosuppressed status secondary to medications:   Maintenance:    - PTA Myfortic 540 mg BID   - PTA on Tacrolimus 1.5 mg BID -> SL Tac 0.5 mg BID, now transitioned back to PO 0.5 mg BID. Goal level 6-8. Monitor daily with changes.    - PTA Prednisone 5 mg daily     Neuro:  Peripheral neuropathy: Follows with Neurology. PTA gabapentin, capsaicin cream.  Acute post-op pain: LLQ abdominal pain.    - Stop PRN IV dilaudid (not using)   - Continue PRN oxycodone (not using) and PRN Tylenol     Hematology:   Acute blood loss anemia: Hgb 10-12 post-operatively (previously WNL). Monitor.  Chronic anticoagulation for Afib and DVT/PE ppx: PTA apixaban. Held starting 12/3 prior to procedure, not restarted post  procedure.    - Continue high intensity heparin gtt.    - Plan to restart apixiban once drains are out.   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     Cardiorespiratory:   Hx CAD: PTA atorvastatin.  PAF with RVR and hypotension: Cardiology consulted earlier this admission due to uncontrolled afib. Received digoxin x2 then amiodorone gtt, then transitioned to IV metoprolol.    - PTA metoprolol 50 mg BID restarted.   - PTA apixaban on HOLD until MAE drains out per Dr. Clifton. Continue high intensity heparin gtt.   Hypotension; Tachycardia: LA normalized. Likely 2/2 abdominal sepsis. Resolved.  Respiratory insufficiency: Secondary to pulmonary edema. Extubated 12/11 without difficulty. Now breathing comfortably on RA. Resolved.      GI/Nutrition:   At risk for malnutrition: Nutrition consulted. Continue TPN while unable to meet caloric needs. CT po contrast on 12/15 without e/o leak. NGT pulled. Advance diet to regular today.   Diarrhea/Cdiff: C diff positive. Oral vanc and flagyl.     Endocrine:   Hypothyroidism: Continue PTA levothyroxine.   Steroid/stress induced hyperglycemia: Continue sliding scale insulin.     Fluid/Electrolytes:  Hypomagnesemia: Holding PTA Mag-Ox 800 mg BID with diarrhea.      : See kidney graft function.      Infectious disease:   Intradbominal infection/Sepsis: Improved since RTOR. Lactate improved. Cultures +yeast (Saccharomyces cerevisiae). Afebrile, no leukocytosis.    - Continue fluconazole x 4 weeks. Stop Zosyn.     Cdiff: PCR/Ag +, toxin Neg. Due to symptoms, po vanco and IV flagyl per Dr. Clifton. Will discuss duration.     Rheum:  Hx Gout: Follows with Rheumatology. PTA allopurinol, prednisone, PRN anakinra restarted 12/16. Received Anakinra 100 mg x2 per Rheumatology.      Prophylaxis: DVT (hep gtt, restart apixiban when able), fall, GI (PPI),  pneumocystis (Bactrim)     Disposition: 7A, plan for discharge home in 2-3 days.     GEORGE/Fellow/Resident Provider: Lizbeth Greer NP #[id number]    Faculty: Damion Gibbons MD  __________________________________________________________________  Transplant History: Admitted 12/8/2023 for abdominal pain.   6/6/2023 (Liver), 6/6/2023 (Kidney), Postoperative day: 195 (Liver), 195 (Kidney)     Interval History: History is obtained from the patient.  Overnight events: No acute events overnight. Patient has no complaints. Patient's spouse reports urinary urgency/frequency and increased pain in LUQ abdomen.     ROS:   A 10-point review of systems was negative except as noted above.    Curent Meds:   allopurinol  300 mg Oral or Feeding Tube Daily    atorvastatin  40 mg Oral or Feeding Tube QPM    fluconazole  400 mg Intravenous Q24H    gabapentin  100 mg Oral or Feeding Tube BID    insulin aspart  1-12 Units Subcutaneous Q4H    levothyroxine  88 mcg Oral or Feeding Tube Daily    lipids plant base  250 mL Intravenous Once per day on Monday Tuesday Wednesday Thursday Friday Saturday    metoprolol tartrate  50 mg Oral BID    metroNIDAZOLE  500 mg Intravenous Q12H    mycophenolic acid  540 mg Oral BID IS    pantoprazole  40 mg Intravenous BID    predniSONE  5 mg Oral or Feeding Tube Daily    sodium chloride (PF)  10-40 mL Intracatheter Q8H    sodium chloride (PF)  3 mL Intracatheter Q8H    sulfamethoxazole-trimethoprim  1 tablet Oral or Feeding Tube Daily    tacrolimus  0.5 mg Oral BID IS    vancomycin  125 mg Oral 4x Daily       Physical Exam:     Current Vitals:   BP (!) 148/69 (BP Location: Left arm)   Pulse 86   Temp 98.1  F (36.7  C) (Oral)   Resp 18   Wt 104.6 kg (230 lb 11.2 oz)   SpO2 97%   BMI 36.13 kg/m      Vital sign ranges:    Temp:  [97.6  F (36.4  C)-98.1  F (36.7  C)] 98.1  F (36.7  C)  Pulse:  [62-86] 86  Resp:  [16-18] 18  BP: (121-148)/(69-87) 148/69  SpO2:  [95 %-99 %] 97 %  Patient Vitals for the past 24  "hrs:   BP Temp Temp src Pulse Resp SpO2   12/18/23 0517 (!) 148/69 98.1  F (36.7  C) Oral 86 18 97 %   12/18/23 0037 137/74 97.6  F (36.4  C) Oral 75 18 99 %   12/17/23 2040 121/87 98  F (36.7  C) Oral 67 18 97 %   12/17/23 2037 121/87 -- -- 67 -- --   12/17/23 1544 130/75 98.1  F (36.7  C) Oral 62 16 95 %       BP (!) 148/69 (BP Location: Left arm)   Pulse 86   Temp 98.1  F (36.7  C) (Oral)   Resp 18   Wt 104.6 kg (230 lb 11.2 oz)   SpO2 97%   BMI 36.13 kg/m      General Appearance: in no apparent distress  Respiratory: unlabored on RA  Cardiovascular: warm, well perfused  GI: abdomen soft, appropriately tender over incision, mildly distended. No rebound or guarding. Incision closed. Drains x3 with s/s output  Skin: warm, dry  Musculoskeletal: BLE pedal edema +1, knee warm and erythematous  Neurologic: A&OX4. No confusion  Psychiatric: calm, behavior appropriate to situation    Frailty Scores          12/27/2022 11/22/2022   Frailty Scores   Final Score Frail Frail   Final Score Number 3 4       Data:   CMP  Recent Labs   Lab 12/18/23  0742 12/18/23  0543 12/17/23  0541 12/17/23  0537   NA  --  137  --  138   POTASSIUM  --  4.8  --  3.9   CHLORIDE  --  106  --  104   CO2  --  21*  --  24   * 131*   < > 150*   BUN  --  31.0*  --  32.7*   CR  --  1.00  --  0.94   GFRESTIMATED  --  83  --  89   NAZARIO  --  8.5*  --  8.3*   MAG  --  1.8  --  2.0   PHOS  --  3.0  --  2.9   ALBUMIN  --  2.7*  --  2.6*   BILITOTAL  --  0.4  --  0.3   ALKPHOS  --  96  --  92   AST  --  22  --  16   ALT  --  21  --  22    < > = values in this interval not displayed.     CBC  Recent Labs   Lab 12/18/23  0543 12/17/23  1724 12/17/23  0537   HGB 11.4* 10.4* 9.8*   WBC 9.6  --  9.6     --  148*     COAGS  No lab results found in last 7 days.    Invalid input(s): \"XA\"     Urinalysis  Recent Labs   Lab Test 12/09/23  1405 11/07/23  0753   COLOR Yellow Yellow   APPEARANCE Clear Clear   URINEGLC Negative Negative   URINEBILI " Negative Small*   URINEKETONE Negative Negative   SG 1.022 1.015   UBLD Negative Negative   URINEPH 5.0 7.0   PROTEIN 10* Trace*   NITRITE Negative Negative   LEUKEST Negative Trace*   RBCU 2 0-2   WBCU 6* 10-25*     Virology:  Hepatitis C Antibody   Date Value Ref Range Status   06/05/2023 Nonreactive Nonreactive Final     BK Virus DNA copies/mL   Date Value Ref Range Status   10/05/2023 Not Detected Not Detected copies/mL Final   09/05/2023 Not Detected Not Detected copies/mL Final   08/29/2023 Not Detected Not Detected copies/mL Final   08/21/2023 Not Detected Not Detected copies/mL Final   08/17/2023 Not Detected Not Detected copies/mL Final   08/01/2023 Not Detected Not Detected copies/mL Final   07/31/2023 Not Detected Not Detected copies/mL Final        Attestation:    The patient has been seen with team and evaluated by me.   Vital signs, labs, medications and orders were reviewed.   When obtained, diagnostic images were reviewed by me and interpreted as above.    The care plan was discussed with the multidisciplinary team and I agree with the findings and plan in this note, with any differences recorded in blue.    Immunosuppressive medication management was reviewed and adjusted as reflected in the note and orders.       Damion Gibbons MD  Professor of Surgery

## 2023-12-19 ENCOUNTER — APPOINTMENT (OUTPATIENT)
Dept: PHYSICAL THERAPY | Facility: CLINIC | Age: 68
End: 2023-12-19
Payer: COMMERCIAL

## 2023-12-19 ENCOUNTER — APPOINTMENT (OUTPATIENT)
Dept: OCCUPATIONAL THERAPY | Facility: CLINIC | Age: 68
End: 2023-12-19
Payer: COMMERCIAL

## 2023-12-19 PROBLEM — B99.9 INTRA-ABDOMINAL INFECTION: Status: ACTIVE | Noted: 2023-12-19

## 2023-12-19 PROBLEM — A04.72 C. DIFFICILE COLITIS: Status: ACTIVE | Noted: 2023-12-19

## 2023-12-19 PROBLEM — E03.9 HYPOTHYROIDISM: Status: ACTIVE | Noted: 2023-12-19

## 2023-12-19 PROBLEM — E83.42 HYPOMAGNESEMIA: Status: ACTIVE | Noted: 2023-12-19

## 2023-12-19 LAB
ALBUMIN SERPL BCG-MCNC: 2.7 G/DL (ref 3.5–5.2)
ALP SERPL-CCNC: 95 U/L (ref 40–150)
ALT SERPL W P-5'-P-CCNC: 19 U/L (ref 0–70)
ANION GAP SERPL CALCULATED.3IONS-SCNC: 9 MMOL/L (ref 7–15)
AST SERPL W P-5'-P-CCNC: 19 U/L (ref 0–45)
BILIRUB SERPL-MCNC: 0.3 MG/DL
BUN SERPL-MCNC: 32.2 MG/DL (ref 8–23)
CALCIUM SERPL-MCNC: 8.4 MG/DL (ref 8.8–10.2)
CHLORIDE SERPL-SCNC: 102 MMOL/L (ref 98–107)
CREAT SERPL-MCNC: 0.91 MG/DL (ref 0.67–1.17)
DEPRECATED HCO3 PLAS-SCNC: 22 MMOL/L (ref 22–29)
EGFRCR SERPLBLD CKD-EPI 2021: >90 ML/MIN/1.73M2
ERYTHROCYTE [DISTWIDTH] IN BLOOD BY AUTOMATED COUNT: 17.2 % (ref 10–15)
GLUCOSE BLDC GLUCOMTR-MCNC: 145 MG/DL (ref 70–99)
GLUCOSE BLDC GLUCOMTR-MCNC: 163 MG/DL (ref 70–99)
GLUCOSE BLDC GLUCOMTR-MCNC: 168 MG/DL (ref 70–99)
GLUCOSE BLDC GLUCOMTR-MCNC: 183 MG/DL (ref 70–99)
GLUCOSE SERPL-MCNC: 150 MG/DL (ref 70–99)
HCT VFR BLD AUTO: 37.3 % (ref 40–53)
HGB BLD-MCNC: 11.2 G/DL (ref 13.3–17.7)
MAGNESIUM SERPL-MCNC: 1.7 MG/DL (ref 1.7–2.3)
MCH RBC QN AUTO: 28.6 PG (ref 26.5–33)
MCHC RBC AUTO-ENTMCNC: 30 G/DL (ref 31.5–36.5)
MCV RBC AUTO: 95 FL (ref 78–100)
PHOSPHATE SERPL-MCNC: 2.8 MG/DL (ref 2.5–4.5)
PLATELET # BLD AUTO: 198 10E3/UL (ref 150–450)
POTASSIUM SERPL-SCNC: 4.7 MMOL/L (ref 3.4–5.3)
PROT SERPL-MCNC: 5 G/DL (ref 6.4–8.3)
RBC # BLD AUTO: 3.91 10E6/UL (ref 4.4–5.9)
SODIUM SERPL-SCNC: 133 MMOL/L (ref 135–145)
TACROLIMUS BLD-MCNC: 9.2 UG/L (ref 5–15)
TME LAST DOSE: NORMAL H
TME LAST DOSE: NORMAL H
UFH PPP CHRO-ACNC: 0.29 IU/ML
UFH PPP CHRO-ACNC: 0.43 IU/ML
UFH PPP CHRO-ACNC: 0.81 IU/ML
WBC # BLD AUTO: 11.9 10E3/UL (ref 4–11)

## 2023-12-19 PROCEDURE — 36592 COLLECT BLOOD FROM PICC: CPT | Performed by: SURGERY

## 2023-12-19 PROCEDURE — 250N000013 HC RX MED GY IP 250 OP 250 PS 637

## 2023-12-19 PROCEDURE — 83735 ASSAY OF MAGNESIUM: CPT | Performed by: NURSE PRACTITIONER

## 2023-12-19 PROCEDURE — 36415 COLL VENOUS BLD VENIPUNCTURE: CPT | Performed by: SURGERY

## 2023-12-19 PROCEDURE — 250N000012 HC RX MED GY IP 250 OP 636 PS 637: Performed by: NURSE PRACTITIONER

## 2023-12-19 PROCEDURE — 80053 COMPREHEN METABOLIC PANEL: CPT | Performed by: STUDENT IN AN ORGANIZED HEALTH CARE EDUCATION/TRAINING PROGRAM

## 2023-12-19 PROCEDURE — 36592 COLLECT BLOOD FROM PICC: CPT | Performed by: NURSE PRACTITIONER

## 2023-12-19 PROCEDURE — 97116 GAIT TRAINING THERAPY: CPT | Mod: GP

## 2023-12-19 PROCEDURE — 80197 ASSAY OF TACROLIMUS: CPT | Performed by: NURSE PRACTITIONER

## 2023-12-19 PROCEDURE — 84100 ASSAY OF PHOSPHORUS: CPT | Performed by: NURSE PRACTITIONER

## 2023-12-19 PROCEDURE — 97530 THERAPEUTIC ACTIVITIES: CPT | Mod: GO

## 2023-12-19 PROCEDURE — 99233 SBSQ HOSP IP/OBS HIGH 50: CPT | Mod: 24 | Performed by: INTERNAL MEDICINE

## 2023-12-19 PROCEDURE — 85520 HEPARIN ASSAY: CPT | Performed by: SURGERY

## 2023-12-19 PROCEDURE — 250N000012 HC RX MED GY IP 250 OP 636 PS 637

## 2023-12-19 PROCEDURE — C9113 INJ PANTOPRAZOLE SODIUM, VIA: HCPCS

## 2023-12-19 PROCEDURE — 250N000013 HC RX MED GY IP 250 OP 250 PS 637: Performed by: NURSE PRACTITIONER

## 2023-12-19 PROCEDURE — 85027 COMPLETE CBC AUTOMATED: CPT | Performed by: STUDENT IN AN ORGANIZED HEALTH CARE EDUCATION/TRAINING PROGRAM

## 2023-12-19 PROCEDURE — 250N000011 HC RX IP 250 OP 636

## 2023-12-19 PROCEDURE — 250N000011 HC RX IP 250 OP 636: Mod: JZ | Performed by: NURSE PRACTITIONER

## 2023-12-19 PROCEDURE — 120N000011 HC R&B TRANSPLANT UMMC

## 2023-12-19 PROCEDURE — 97535 SELF CARE MNGMENT TRAINING: CPT | Mod: GO

## 2023-12-19 RX ORDER — FUROSEMIDE 10 MG/ML
20 INJECTION INTRAMUSCULAR; INTRAVENOUS ONCE
Status: COMPLETED | OUTPATIENT
Start: 2023-12-19 | End: 2023-12-19

## 2023-12-19 RX ORDER — SIMETHICONE 80 MG
80 TABLET,CHEWABLE ORAL EVERY 6 HOURS PRN
Status: DISCONTINUED | OUTPATIENT
Start: 2023-12-19 | End: 2023-12-20 | Stop reason: HOSPADM

## 2023-12-19 RX ORDER — FUROSEMIDE 10 MG/ML
40 INJECTION INTRAMUSCULAR; INTRAVENOUS DAILY
Status: COMPLETED | OUTPATIENT
Start: 2023-12-20 | End: 2023-12-20

## 2023-12-19 RX ORDER — PANTOPRAZOLE SODIUM 40 MG/1
40 TABLET, DELAYED RELEASE ORAL
Status: DISCONTINUED | OUTPATIENT
Start: 2023-12-20 | End: 2023-12-20 | Stop reason: HOSPADM

## 2023-12-19 RX ORDER — FLUCONAZOLE 200 MG/1
400 TABLET ORAL DAILY
Status: DISCONTINUED | OUTPATIENT
Start: 2023-12-19 | End: 2023-12-20 | Stop reason: HOSPADM

## 2023-12-19 RX ORDER — OXYCODONE HYDROCHLORIDE 5 MG/1
5 TABLET ORAL EVERY 6 HOURS PRN
Status: DISCONTINUED | OUTPATIENT
Start: 2023-12-19 | End: 2023-12-20 | Stop reason: HOSPADM

## 2023-12-19 RX ORDER — GABAPENTIN 100 MG/1
200 CAPSULE ORAL DAILY
Status: DISCONTINUED | OUTPATIENT
Start: 2023-12-19 | End: 2023-12-20 | Stop reason: HOSPADM

## 2023-12-19 RX ADMIN — LEVOTHYROXINE SODIUM 88 MCG: 88 TABLET ORAL at 07:41

## 2023-12-19 RX ADMIN — ATORVASTATIN CALCIUM 40 MG: 40 TABLET, FILM COATED ORAL at 19:59

## 2023-12-19 RX ADMIN — MYCOPHENOLIC ACID 540 MG: 360 TABLET, DELAYED RELEASE ORAL at 07:40

## 2023-12-19 RX ADMIN — GABAPENTIN 100 MG: 100 CAPSULE ORAL at 19:59

## 2023-12-19 RX ADMIN — HEPARIN SODIUM 2350 UNITS/HR: 10000 INJECTION, SOLUTION INTRAVENOUS at 23:50

## 2023-12-19 RX ADMIN — HEPARIN SODIUM 2450 UNITS/HR: 10000 INJECTION, SOLUTION INTRAVENOUS at 11:52

## 2023-12-19 RX ADMIN — FLUCONAZOLE 400 MG: 200 TABLET ORAL at 14:16

## 2023-12-19 RX ADMIN — GABAPENTIN 200 MG: 100 CAPSULE ORAL at 14:16

## 2023-12-19 RX ADMIN — METOPROLOL TARTRATE 50 MG: 50 TABLET, FILM COATED ORAL at 07:41

## 2023-12-19 RX ADMIN — VANCOMYCIN HYDROCHLORIDE 125 MG: 125 CAPSULE ORAL at 07:41

## 2023-12-19 RX ADMIN — VANCOMYCIN HYDROCHLORIDE 125 MG: 125 CAPSULE ORAL at 15:52

## 2023-12-19 RX ADMIN — TACROLIMUS 0.5 MG: 0.5 CAPSULE ORAL at 07:40

## 2023-12-19 RX ADMIN — GABAPENTIN 100 MG: 100 CAPSULE ORAL at 07:41

## 2023-12-19 RX ADMIN — METOPROLOL TARTRATE 50 MG: 50 TABLET, FILM COATED ORAL at 19:59

## 2023-12-19 RX ADMIN — PREDNISONE 5 MG: 5 TABLET ORAL at 07:41

## 2023-12-19 RX ADMIN — VANCOMYCIN HYDROCHLORIDE 125 MG: 125 CAPSULE ORAL at 19:59

## 2023-12-19 RX ADMIN — ALLOPURINOL 300 MG: 300 TABLET ORAL at 07:41

## 2023-12-19 RX ADMIN — TACROLIMUS 0.5 MG: 0.5 CAPSULE ORAL at 18:56

## 2023-12-19 RX ADMIN — HEPARIN SODIUM 2300 UNITS/HR: 10000 INJECTION, SOLUTION INTRAVENOUS at 01:51

## 2023-12-19 RX ADMIN — MYCOPHENOLIC ACID 540 MG: 360 TABLET, DELAYED RELEASE ORAL at 18:55

## 2023-12-19 RX ADMIN — SIMETHICONE 80 MG: 80 TABLET, CHEWABLE ORAL at 20:07

## 2023-12-19 RX ADMIN — VANCOMYCIN HYDROCHLORIDE 125 MG: 125 CAPSULE ORAL at 11:53

## 2023-12-19 RX ADMIN — SULFAMETHOXAZOLE AND TRIMETHOPRIM 1 TABLET: 400; 80 TABLET ORAL at 07:41

## 2023-12-19 RX ADMIN — ACETAMINOPHEN 650 MG: 325 TABLET, FILM COATED ORAL at 07:39

## 2023-12-19 RX ADMIN — PANTOPRAZOLE SODIUM 40 MG: 40 INJECTION, POWDER, FOR SOLUTION INTRAVENOUS at 07:40

## 2023-12-19 RX ADMIN — METHOCARBAMOL 500 MG: 500 TABLET ORAL at 05:26

## 2023-12-19 ASSESSMENT — ACTIVITIES OF DAILY LIVING (ADL)
ADLS_ACUITY_SCORE: 28

## 2023-12-19 NOTE — PROGRESS NOTES
[facility]   Transplant Nephrology Progress Note  Date of Admission:  12/8/2023  Today's Date: 12/19/2023    Recommendations:  - No acute indications for dialysis.  - Would continue with diuresis with furosemide 40 mg IV daily.  - Would consider 2-3 doses of iron ferrlecit 200 mg IV daily for anemia with mild iron deficiency when out of infection risk.    Assessment & Plan   # DDKT (SLK): Stable creatinine at baseline.  Good urine output.  No acute indications for dialysis.   - PATI felt secondary to hypotension and sepsis with probable ATN.    - Baseline Creatinine: ~ 0.8-1.0   - Proteinuria: Normal (<0.2 grams)   - Date DSA Last Checked: Aug/2023      Latest DSA: No   - BK Viremia: No   - Kidney Tx Biopsy: Jun 20, 2023; Result: No diagnostic evidence of acute rejection.   Focal acute tubular necrosis.  Mild arterial and arteriolar sclerosis.    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  Transaminases Stable.  Followed by Transplant Surgery.     # Immunosuppression Prior to Admission: Tacrolimus immediate release (goal 6-8), Mycophenolic acid (dose 540 mg every 12 hours), and Prednisone (dose 5 mg daily)   - Present Immunosuppression: Tacrolimus immediate release (goal 6-8), Mycophenolate mofetil (dose 500 mg every 12 hours), and Methylprednisolone (dose 4 mg daily)   - Patient is in an immunosuppressed state and will continue to monitor for efficacy and toxicity of immunosuppression medications.   - Changes: Not at this time     # Infection Prophylaxis:   - PJP: Sulfa/TMP (Bactrim)  - CMV: None, prophylaxis completed; CMV IgG Ab positive  - Fungal: Fluconazole (Diflucan)    # Hypertension: Controlled;  Goal BP: < 140/90 (Hospitalization goal)   - Volume status: Mildly hypervolemic  EDW ~ 225 lbs   - Changes: Yes - Would continue furosemide 40 mg IV daily.    # Elevated Blood Glucose:  Glucose generally running ~ 100-150s   - On insulin.   - Management as per primary team.  Would consider trying low dose long-acting insulin to help avoid need for short-acting as patient reports possible symptoms.    # Anemia in Chronic Renal Disease: Hgb: Stable      FEDERICO: No   - Iron studies: Low iron saturation; Would consider course of IV iron when patient is out of infection risk concern.    # Mineral Bone Disorder:   - Secondary renal hyperparathyroidism; PTH level: Normal (15-65 pg/ml)        On treatment: None  - Vitamin D; level: Normal        On supplement: No  - Calcium; level: Normal when corrected for low serum albumin        On supplement: No  - Phosphorus; level: Normal        On supplement: No    # Electrolytes:   - Potassium; level: Normal        On supplement: No  - Magnesium; level: Normal        On supplement: No  - Bicarbonate; level: Normal        On supplement: No    # Umbilical Hernia Mesh Repair: Patient is s/p scheduled redo mesh repair of umbilical hernia and minimal DHEERAJ of small bowel 12/7/23. Patient then represented 12/8/23 from home with severe LLQ abdominal pain and N/V 12/8/23.  Back to OR 12/10 and found to have bowel leak and repaired.  On broad spectrum-antibiotics and antifungal.    # CAD, s/p PCI: Asymptomatic.     # Paroxysmal A-Fib with Episodic RVR: Patient had episode of unresponsiveness 12/8/23- possible seizure vs apnea. Extensive workup with evidence of A fib w RVR, s/p PO dose of metoprolol.  Received Digoxin 500 mcg given IV once and subsequent dose of 250mcg on 12/9.  On apixaban PTA.  A. fib felt to be driven by septic shock. Started on amiodarone gtt on 12/9.  Presently in A. fib, but rate controlled.  Anticoagulation restarted on heparin gtt.    # Obesity, Class II (BMI = 39.7): Trend up in weight, likely some volume related following surgery.   - Once patient heals and recovers from surgery, would recommend working on weight loss for overall health by increasing  exercise and watching caloric intake.    # Clostridium difficile Colitis: Patient with h/o C. diff and likely colonized, but has had worsening diarrhea and on broad-spectrum antibiotics making him susceptible.   - Agree with plan to start oral vancomycin and would treat with 125 mg qid x 14 days, then 125 mg bid x 14 days and reassess.    # Peripheral Neuropathy: Stable on gabapentin.     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  Previously treated with rituximab x 2.     # Hx of Gout: PTA on prednisone 5mg daily and allopurinol 300mg daily.  Also takes anakinra 100 mg with gout symptoms.  Had gout flare, but symptoms improved.  Allopurinol now restarted and received anakinra.  Rheumatology following.    # Transplant History:  Etiology of Kidney Failure: IgA nephropathy and ANCA vasculitis  Tx: DDKT (SLK) and Liver Tx (SLK)  Transplant: 6/6/2023 (Liver), 6/6/2023 (Kidney)  Significant changes in immunosuppression: None  Significant transplant-related complications: CMV Viremia and DGF    Recommendations were communicated to the primary team via this note.    Jeovany Soctt MD  Transplant Nephrology  Contact information available via [facility] Paging/Directory    Interval History   Mr. Miranda creatinine is 0.91 (12/19 0541); Stable.  Good urine output.  Normal transaminases.  Other significant labs/tests/vitals: Stable electrolytes.  Stable hemoglobin.  No new events overnight.  No chest pain or shortness of breath.  Stable leg swelling.  Some nausea, which he feels is secondary to insulin, but no vomiting.  Bowel movements are loose.  No fever, sweats or chills.    Review of Systems   4 point ROS was obtained and negative except as noted in the Interval History.    MEDICATIONS:   allopurinol  300 mg Oral or Feeding Tube Daily    atorvastatin  40 mg Oral or Feeding Tube QPM    fluconazole  400 mg Oral Daily    gabapentin  100 mg Oral or Feeding Tube BID    gabapentin  200 mg Oral Daily    insulin aspart  1-12  Units Subcutaneous Q4H    levothyroxine  88 mcg Oral or Feeding Tube Daily    metoprolol tartrate  50 mg Oral BID    mycophenolic acid  540 mg Oral BID IS    [START ON 2023] pantoprazole  40 mg Oral QAM AC    predniSONE  5 mg Oral or Feeding Tube Daily    sodium chloride (PF)  10-40 mL Intracatheter Q8H    sodium chloride (PF)  3 mL Intracatheter Q8H    sulfamethoxazole-trimethoprim  1 tablet Oral or Feeding Tube Daily    tacrolimus  0.5 mg Oral BID IS    vancomycin  125 mg Oral 4x Daily      dextrose      heparin 2,450 Units/hr (23 1332)    parenteral nutrition - ADULT compounded formula 45 mL/hr at 23       Physical Exam   Temp  Av.1  F (36.7  C)  Min: 96.7  F (35.9  C)  Max: 100.1  F (37.8  C)  Arterial Line BP  Min: 70/55  Max: 158/122  Arterial Line MAP (mmHg)  Av.2 mmHg  Min: 61 mmHg  Max: 207 mmHg      Pulse  Av  Min: 60  Max: 147 Resp  Av  Min: 9  Max: 36  FiO2 (%)  Av.9 %  Min: 40 %  Max: 50 %  SpO2  Av.9 %  Min: 85 %  Max: 100 %     /77 (BP Location: Left arm)   Pulse 73   Temp 98  F (36.7  C) (Oral)   Resp 16   Wt 104.5 kg (230 lb 6.4 oz)   SpO2 96%   BMI 36.09 kg/m     Date 23 07 - 23 0659   Shift 8223-8304 3085-5994 0132-8870 24 Hour Total   INTAKE   I.V. 545.28   545.28      160   Shift Total(mL/kg) 705.28(6.33)   705.28(6.33)   OUTPUT   Urine 225   225   Shift Total(mL/kg) 225(2.02)   225(2.02)   Weight (kg) 111.5 111.5 111.5 111.5      Admit Weight: 111.5 kg (245 lb 13 oz)     GENERAL APPEARANCE: alert and no distress  HENT: mouth without ulcers or lesions, NG in place  RESP: lungs clear to auscultation from anterior - no rales, rhonchi or wheezes  CV: irregular rhythm and rate, no rub, no murmur  EDEMA: 1+ LE edema bilaterally  ABDOMEN: soft, nondistended, mild TTP, bowel sounds normal  MS: extremities normal - no gross deformities noted, no evidence of inflammation in joints, no muscle tenderness  SKIN: no  rash  TX KIDNEY: normal  DIALYSIS ACCESS:  None    Data   All labs reviewed by me.  CMP  Recent Labs   Lab 12/19/23  1156 12/19/23  0942 12/19/23  0744 12/19/23  0541 12/18/23  0742 12/18/23  0543 12/17/23  0541 12/17/23  0537 12/16/23  0912 12/16/23  0742   NA  --   --   --  133*  --  137  --  138  --  138   POTASSIUM  --   --   --  4.7  --  4.8  --  3.9  --  3.6   CHLORIDE  --   --   --  102  --  106  --  104  --  104   CO2  --   --   --  22  --  21*  --  24  --  25   ANIONGAP  --   --   --  9  --  10  --  10  --  9   * 163* 183* 150*   < > 131*   < > 150*   < > 185*   BUN  --   --   --  32.2*  --  31.0*  --  32.7*  --  33.8*   CR  --   --   --  0.91  --  1.00  --  0.94  --  0.96   GFRESTIMATED  --   --   --  >90  --  83  --  89  --  87   NAZARIO  --   --   --  8.4*  --  8.5*  --  8.3*  --  8.2*   MAG  --   --   --  1.7  --  1.8  --  2.0  --  2.0   PHOS  --   --   --  2.8  --  3.0  --  2.9  --  3.2   PROTTOTAL  --   --   --  5.0*  --  5.2*  --  4.9*  --  4.8*   ALBUMIN  --   --   --  2.7*  --  2.7*  --  2.6*  --  2.5*   BILITOTAL  --   --   --  0.3  --  0.4  --  0.3  --  0.5   ALKPHOS  --   --   --  95  --  96  --  92  --  100   AST  --   --   --  19  --  22  --  16  --  17   ALT  --   --   --  19  --  21  --  22  --  25    < > = values in this interval not displayed.     CBC  Recent Labs   Lab 12/19/23  0541 12/18/23  0543 12/17/23  1724 12/17/23  0537 12/16/23  0742   HGB 11.2* 11.4* 10.4* 9.8* 10.5*   WBC 11.9* 9.6  --  9.6 12.4*   RBC 3.91* 3.97*  --  3.46* 3.65*   HCT 37.3* 38.1*  --  32.3* 34.1*   MCV 95 96  --  93 93   MCH 28.6 28.7  --  28.3 28.8   MCHC 30.0* 29.9*  --  30.3* 30.8*   RDW 17.2* 17.0*  --  17.2* 17.4*    174  --  148* 119*     INR  No lab results found in last 7 days.    ABG  No lab results found in last 7 days.     Urine Studies  Recent Labs   Lab Test 12/18/23  1456 12/09/23  1405 11/07/23  0753 09/27/23  1157 09/25/23  0759 09/20/23  1645 08/29/23  1023 08/04/23  0715   COLOR  Straw Yellow Yellow Dark Yellow* Yellow Yellow   < > Yellow   APPEARANCE Clear Clear Clear Clear Clear Clear   < > Clear   URINEGLC Negative Negative Negative 50* Negative 100*   < > Negative   URINEBILI Negative Negative Small* Small* Small* Negative   < > Negative   URINEKETONE Negative Negative Negative Negative Negative Negative   < > Negative   SG 1.005 1.022 1.015 1.029 1.020 1.015   < > 1.010   UBLD Negative Negative Negative Negative Negative Negative   < > Negative   URINEPH 7.0 5.0 7.0 6.0 7.0 7.0   < > 6.5   PROTEIN Negative 10* Trace* 70* 30* Negative   < > Negative   UROBILINOGEN  --   --  0.2  --  0.2 0.2  --  0.2   NITRITE Negative Negative Negative Negative Negative Negative   < > Negative   LEUKEST Negative Negative Trace* Negative Negative Negative   < > Small*   RBCU 0 2 0-2 1 None Seen  --   --  0-2   WBCU 2 6* 10-25* 7* None Seen  --   --  0-5    < > = values in this interval not displayed.     No lab results found.  PTH  Recent Labs   Lab Test 09/20/23  1611 08/01/23  0924 05/19/23  0956   PTHI 24 45 67*     Iron Studies  Recent Labs   Lab Test 11/21/23  0742 11/07/23  0753 10/05/23  0752 09/05/23  0702 08/07/23  0726 08/01/23  0924 11/22/22  0848   IRON 60* 53* 83 32* 28* 23* 68    226* 241 258 244 231* 219*   IRONSAT 22 23 34 12* 11* 10* 31   HOLA 204 341 919* 485* 440* 506* 429*       IMAGING:  All imaging studies reviewed by me.

## 2023-12-19 NOTE — PROGRESS NOTES
CLINICAL NUTRITION SERVICES - BRIEF NOTE     Nutrition Prescription    Recommendations already ordered by Registered Dietitian (RD):  Discontinue TPN/lipids    Future/Additional Recommendations:  Continue to encourage protein sources with meals.  Consider oral protein supplements if patient agreeable.      EVALUATION OF THE PROGRESS TOWARD GOALS   Diet: Regular   TPN/lipids started 12/10  Intake: Yesterday ate 100% lower end kcal needs and 69% lower end protein needs.     INTERVENTIONS  Discussed with team on rounds and PharmD, will stop TPN.     Monitoring/Evaluation  Progress toward goals will be monitored and evaluated per protocol.     Lizbeth Powell, MS, RD, LD, CCTD, CNSC  7A and 7B beds [phone], pager [phone]  Weekend pager [phone]

## 2023-12-19 NOTE — PLAN OF CARE
Vitals: stable room air.   Blood glucose: q4 hrs : 150s. Sliding scale covered.   Pain/nausea: tylenol x 1 for generalized pain.   Diet: Regular.   Lines: PIVL saline locked. Central line chest infusing SR Heparin 2300.hr and TPN infusing @ 45. Xa due @ 2346.  : good UOP. Voids in urinal.   GI: x 2.   Drains: MAE R removed. MAE 2 100 ml. MAE 1 10 ml/. OP adequate, dressing all changed.    Skin: incision dressing packed and changed.   Mobility: up x 1 x walker.   Walked with PT & OT.

## 2023-12-19 NOTE — PLAN OF CARE
Vitals: stable room air.   Blood glucose: q4 hrs : 160-180s Sliding scale covered.   Pain/nausea: tylenol x 1 for generalized pain.   Diet: Regular.   Lines: PIVL saline locked. Central line chest infusing SR Heparin @ 2450 units/hr. and TPN infusing @ 45. Xa due @ 1930.   : good UOP. Voids in urinal.   GI: x 2.   Drains: MAE 2 good op . MAE 1 removed. dressing all changed.  wife shown how to dressing change.   Skin: Incision dressing packed and changed. wife taught.   Mobility: up x 1 x walker.   Walked with PT.

## 2023-12-19 NOTE — PLAN OF CARE
Occupational Therapy Discharge Summary    Reason for therapy discharge:    All goals and outcomes met, no further needs identified.    Progress towards therapy goal(s). See goals on Care Plan in [facility] electronic health record for goal details.  Goals met    Therapy recommendation(s):    Continued therapy is recommended.  Rationale/Recommendations:   OT to progress IADL IND in home environment.

## 2023-12-19 NOTE — PROGRESS NOTES
Calorie Count  Intake recorded for: 12/18  Total Kcals: 1619 Total Protein: 80g  Kcals from Hospital Food: 1619  Protein: 80g  Kcals from Outside Food (average):0 Protein: 0g  # Meals Ordered from Kitchen: 3 meals   # Meals Recorded: 2 meals (First - 100% 2 slices toast w/ peanut butter & jelly, chocolate milk, whole milk, less than 25% penne pasta w/ meat sauce)       (Second - 100% side mac & cheese, peanut butter & jelly sandwich, 2 whole milks, 2 skim milks)   # Supplements Recorded: 0

## 2023-12-19 NOTE — PLAN OF CARE
Goal Outcome Evaluation:  /74 (BP Location: Left arm)   Pulse 83   Temp 97.8  F (36.6  C) (Oral)   Resp 18   Wt 108.4 kg (239 lb)   SpO2 96%   BMI 37.43 kg/m      Shift: [id number]  Isolation Status: Contact + enteric precautions   VS: Vitals stable, room air, afebrile  Neuro: Alert and oriented x4   BG: Q4hr sugars 126, 143, 145  Labs: Awaiting AM labs, next xa with morning labs   Pain/Nausea: Denies nausea, prn robaxin x1  for pain   Diet: Regular diet, betsy counts   IV Access: R chest port, PIV x1   Infusion(s): TPN + lipids, heparin at 24.5  Lines/Drains: MAE drain x2   GI/: Voiding adequately, LBM 12/18  Skin: Midline abdominal incision packed  Mobility: Assist x1   Plan: Continue with POC and notify team with any changes

## 2023-12-19 NOTE — PROGRESS NOTES
Antimicrobial Stewardship Team Note    Antimicrobial Stewardship Program - A joint venture between [facility] and  Physicians to optimize antibiotic management.  NOT a formal consult - Restricted Antimicrobial Review     Patient: Damion Quinones  MRN: [id number]  Allergies: Patient has no known allergies.    Brief Summary: Damion Quinones is a 68 year old male with a history of PAF, obesity, HTN, pre-diabetes, rheumatoid and psoriatic arthritis, CAD, and cirrhosis (alcohol + hemochromatosis) and ESKD (IgA nephropathy + ANCA vasculitis) s/p simultaneous liver and kidney transplant on 6/6/23 (on tacrolimus, Myfortic, and prednisone), recurrent umbilical hernia and underwent planned redo mesh repair of umbilical hernia and minimal DHEERAJ of small bowel on 12/7/23 and sent home. Patient presented on 12/8/2023 with severe LLQ abdominal pain radiating to rectum, nausea and vomiting found to have increased small to moderate volume loculated ascites on CT abdomen/pelvis.    On 12/10 overnight, patient became progressively hypotensive (SBP 50-80s) despite IV fluid resuscitation requiring pressor support, tachycardic (-140s) in the setting of Afib with RVR and tmax 100.1. Labs were notable for elevated lactic acid (peaked at 5.6) and procalcitonin 32.9. WBC within normal limits. Initiated on empiric Zosyn and IV vancomycin then taken to the OR due to persistent sever abdominal pain. Patient underwent exploratory laparotomy, primary repair of the small bowel perforation, abdominal washout, removal of mesh, and placement of 3 drains 1 in the pelvis 1 in the splenic bed and 1 in the subhepatic space on 12/10. IV vancomycin stopped and fluconazole was added to Zosyn. Blood cultures are negative and intraoperative aerobic and fungal cultures from the wound, tissue, and mesh all grew 1+ Saccharomyces cerevisiae (fluconazole REBECA 1). Antimicrobial stewardship team rounded on 12/13/2023 and agreed with continuing  empiric Zosyn and fluconazole pending culture finalization and recommended transplant ID consult for selection of definitive antimicrobial therapy, duration, and follow up.     On 12/14, patient was noted to have 7 episodes of loose-watery BM overnight and C diff test was sent. Afebrile with all other VS within normal limits. WBC 8.2. C diff PCR and antigen positive, toxin negative. Patient was started on IV metronidazole and oral vancomycin was started with a plan to complete 14 days of therapy followed by 14 days of prophylaxis at BID dosing. Patient was also noted to have gout flare, rheumatology consulted and recommended anakinra x3 doses. New leukocytosis (WBC 16.8) was noted on 12/15, down trended to 11.9 today. No fevers or changes in VS. CT abdomen/pelvis on 12/15 showed small amount of residual free fluid within the abdomen without any free air to suggest viscus perforation. Increased mild anasarca and moderate left pleural effusion. BM noted to improve, 1 episode on 12/16. Of note, patient's home Myfortic was switched to Cellcept from 12/10-12/16 while NG tube in place (pulled 12/16) then back to home Myfortic on 12/16. Zosyn was stopped on 12/18 to complete 10 days of therapy with a plan to continue fluconazole for 4 weeks. RLQ  drain removed on 12/18 and the other 2 LLQ drains remain in place.            Active Anti-infective Medications   (From admission, onward)                 Start     Stop    12/14/23 2000  vancomycin  125 mg,   Oral,   4 TIMES DAILY        Clostridioides difficile       --    12/14/23 2000  metroNIDAZOLE  500 mg,   Intravenous,   EVERY 12 HOURS        Clostridioides difficile       --    12/12/23 1200  fluconazole  400 mg,   Intravenous,   100 mL/hr,   EVERY 24 HOURS        possible bowel perforation       --    12/11/23 0800  sulfamethoxazole-trimethoprim  1 tablet,   Oral or Feeding Tube,   DAILY        PCP prophylaxis       --                  Assessment: Post-operative  peritonitis s/p repair of bowel perforation, washout, mesh removal and placement of drains   Patient is POD#9 of abdominal procedure for source control of peritonitis secondary to bowel perforation currently on IV fluconazole monotherapy. Zosyn was stopped on 12/18. Intraoperative cultures only grew Saccharomyces cerevisiae. Patient did receive about 24 hours of empiric Zosyn and IV vancomycin as well as PTA ciprofloxacin prior to procedure which could have decreased culture yield for enteric bacteria. We agree with continuing current fluconazole for S. cerevisiae and recommend adding coverage against enteric bacteria give peritonitis was secondary to bowel perforation. May consider oral ciprofloxacin and metronidazole which would provide coverage against enteric aerobic and anaerobic organisms. IV fluconazole could be switched to oral formulation (1:1 conversion) as patient is taking and tolerating other oral medications. Patient has remained clinically stable without fevers and leukocytosis is now down trending. Repeat CT shows decreased free fluid and no evidence of viscus perforation. Drains remain in place with some output which will require prolonged duration of therapy. We recommend transplant ID consult for guidance with duration of therapy and follow up.     C. diff infection vs colonization   Patient was noted to have increased BM with positive C diff PCR and antigen test, but negative toxin suggesting likely colonization over infection. No fevers, leukocytosis, or changes in vital signs at time of testing and no findings of colitis on CT abdomen/pelvis that would suggest C diff infection. Patient did not have hypotension or shock to suggest fulminant C diff infection thus no indication for IV metronidazole. Patient's home Myfortic was switched to Cellcept from 12/10-12/16 which could have increased bowel movement as diarrhea rates are higher (up to 53%) with Cellcept vs (24%) with Myfortic. Bowel  movements have now been noted to improve with 1-2 episodes/day as patient is back on his home medication. Of note patient has prior history of recurrent C diff infection and completed multiple courses of treatment including prolonged taper. Reasonable to continue current oral vancomycin given systemic antibiotic with ciprofloxacin for peritonitis. Defer final duration and need for prophylaxis to transplant ID consult.      Recommendations:  Discontinue IV flagyl   Agree with fluconazole, please switch to oral (1:1 conversion) 400 mg PO daily  Start ciprofloxacin 500 mg PO BID and metronidazole 500 mg PO BID  Transplant ID consult for definitive antimicrobial selection, duration, and follow up     Pharmacy took the following actions: Electronic note created, Called/paged provider.    Discussed with ID Staff: Garret Cardenas MD, M.Med.Sc.  Oma Doherty, PharmD, [facility]  Pager: [phone]      Vital Signs/Clinical Features:  Vitals         12/17 0700  12/18 0659 12/18 0700  12/19 0659 12/19 0700  12/19 1300   Most Recent      Temp ( F) 97.6 -  98.1    97.8 -  98.5      98.4     98.4 (36.9) 12/19 1022    Pulse 62 -  86    75 -  93      84     84 12/19 1022    Resp 16 -  18    16 -  18      17     17 12/19 1022    /87 -  148/69    105/74 -  131/75      100/65     100/65 12/19 1022    SpO2 (%) 95 -  99    94 -  97      96     96 12/19 1022            Labs  Estimated Creatinine Clearance: 92 mL/min (based on SCr of 0.91 mg/dL).  Recent Labs   Lab Test 12/14/23  0504 12/15/23  0547 12/16/23  0742 12/17/23  0537 12/18/23  0543 12/19/23  0541   CR 1.02 0.95 0.96 0.94 1.00 0.91       Recent Labs   Lab Test 09/25/23  0759 09/27/23  1152 09/28/23  0605 10/05/23  0752 10/10/23  0757 12/09/23  2152 12/10/23  0233 12/10/23  1408 12/11/23  0327 12/15/23  0547 12/15/23  1533 12/16/23  0742 12/17/23  0537 12/17/23  1724 12/18/23  0543 12/19/23  0541   WBC 8.6  8.6 11.2*   < > 5.1  4.8   < > 6.2 5.7 5.9   < > 15.4*  16.8* 12.4* 9.6  --  9.6 11.9*   ANEU 5.0 6.6  --  4.1  --  4.9 4.6 5.2  --   --   --   --   --   --   --   --    ALYM 0.5* 0.4*  --  0.5*  --  1.1 0.7* 0.2*  --   --   --   --   --   --   --   --    EUSEBIA 1.1 1.1  --  0.3  --  0.2 0.3 0.5  --   --   --   --   --   --   --   --    AEOS 0.2 0.1  --  0.2  --  0.1 0.0 0.0  --   --   --   --   --   --   --   --    HGB 11.8* 11.4*   < > 11.9*   < > 15.4 13.3 12.1*   < > 12.1* 10.9* 10.5* 9.8* 10.4* 11.4* 11.2*   HCT 40.7 38.7*   < > 39.8*   < > 49.3 43.1 39.0*   < > 40.0 35.3* 34.1* 32.3*  --  38.1* 37.3*   MCV 95 93   < > 92   < > 93 93 93   < > 96 93 93 93  --  96 95    231   < > 247   < > 192 174 128*   < > 109* 104* 119* 148*  --  174 198    < > = values in this interval not displayed.       Recent Labs   Lab Test 12/14/23  0504 12/15/23  0547 12/16/23  0742 12/17/23  0537 12/18/23  0543 12/19/23  0541   BILITOTAL 0.8 1.0 0.5 0.3 0.4 0.3   ALKPHOS 106 115 100 92 96 95   ALBUMIN 2.8* 2.4* 2.5* 2.6* 2.7* 2.7*   AST 36 29 17 16 22 19   ALT 23 36 25 22 21 19       Recent Labs   Lab Test 12/16/22  1652 12/16/22  2004 01/15/23  1107 01/15/23  1140 06/10/23  0341 06/10/23  1012 06/28/23  0644 09/27/23  1817 09/29/23  0520 12/08/23  1109 12/08/23  1430 12/09/23  2152 12/10/23  0204 12/10/23  0511 12/10/23  1408 12/10/23  1731 12/11/23  0327   PCAL  --   --   --   --  8.66*  --   --   --   --   --   --  32.90*  --   --   --   --   --    LACT  --    < >  --    < > 1.3   < >  --    < >  --  2.6*   < > 4.0* 2.4* 2.1* 1.5 1.0 2.0   CRPI 77.70*  --  19.20*  --   --   --  83.22*  --  252.00* 9.18*  --   --   --   --   --   --   --    SED  --   --  11  --   --   --   --   --  66*  --   --   --   --   --   --   --   --     < > = values in this interval not displayed.       Recent Labs   Lab Test 06/19/23  1401 06/20/23  0604 11/21/23  0742 12/10/23  0511 12/19/23  0541   VANCOMYCIN 18.9  --   --   --   --    TACROL  --    < > 11.4   < > 9.2   MPACID  --    < > 1.08  --   --     MPAG  --    < > 55.1  --   --     < > = values in this interval not displayed.       Culture Results:  7-Day Micro Results       Procedure Component Value Units Date/Time    C. difficile Toxin B PCR with reflex to C. difficile Antigen and Toxins A/B EIA [[id number]]  (Abnormal) Collected: 12/14/23 1552    Order Status: Completed Lab Status: Final result Updated: 12/14/23 1729    Specimen: Stool from Per Rectum      C Difficile Toxin B by PCR Positive     Comment: Detection of C. difficile nucleic acid in stools confirms the presence of these organisms in diarrheal patients but may not indicate that C. difficile is the etiologic agent of the diarrhea. Results from the Xpert C. difficile assay should be interpreted in conjunction with other laboratory and clinical data available to the clinician.    Patients with a positive C. difficile PCR result will receive reflex GDH/Toxin Immunoassay testing. Please interpret the PCR test result in conjunction with GDH/Toxin Immunoassay results and the clinical status of patient.       Narrative:      The [employer] Xpert C. difficile Assay, performed on the [employer]ument Systems, is a qualitative in vitro diagnostic test for rapid detection of toxin B gene sequences from unformed (liquid or soft) stool specimens collected from patients suspected of having Clostridioides difficile infection (CDI). The test utilizes automated real-time polymerase chain reaction (PCR) to detect toxin gene sequences associated with toxin producing C. difficile. The Xpert C. difficile Assay is intended as an aid in the diagnosis of CDI.    C. difficile Antigen and Toxins A/B by Enzyme Immunoassay [[id number]]  (Abnormal) Collected: 12/14/23 1552    Order Status: Completed Lab Status: Final result Updated: 12/14/23 1815    Specimen: Stool from Per Rectum      C. difficile GDH Antigen Positive     C. difficile Toxin Negative    Narrative:      C. difficile GDH antigen detected and C.  difficile toxin not detected by enzyme immunoassay. These results indicate the organism is present and the toxin is absent or below the limit of detection. Results must be interpreted based on clinical findings.            Recent Labs   Lab Test 09/25/23  0759 09/27/23  1157 11/07/23  0753 12/09/23  1405 12/18/23  1456   URINEPH 7.0 6.0 7.0 5.0 7.0   NITRITE Negative Negative Negative Negative Negative   LEUKEST Negative Negative Trace* Negative Negative   WBCU None Seen 7* 10-25* 6* 2                   Recent Labs   Lab Test 12/14/23  1552   CDBPCT Positive*       Imaging: CT Abdomen Pelvis w Contrast    Result Date: 12/15/2023  EXAMINATION: CT ABDOMEN PELVIS W CONTRAST, 12/15/2023 4:41 PM INDICATION: a/p repair of sm perforation, r/o leak COMPARISON STUDY: 12/9/2023, 1/15/2023 TECHNIQUE: CT scan of the abdomen and pelvis was performed on multidetector CT scanner using volumetric acquisition technique and images were reconstructed in multiple planes with variable thickness and reviewed on dedicated workstations. CONTRAST: 135 mL Isovue 370 injected IV without oral contrast CT scan radiation dose is optimized to minimum requisite dose using automated dose modulation techniques. FINDINGS: Support devices: Enteric tube is in the stomach. Surgical drain is present with tip in the right lower quadrant. Lower thorax: Moderate layering left pleural effusion and adjacent atelectasis. Liver: Hepatic transplant. No mass. No intrahepatic biliary ductal dilation. Biliary System: Cholecystectomy. No extrahepatic biliary ductal dilation. Pancreas: No mass or pancreatic ductal dilation. Diffuse pancreatic atrophy. Adrenal glands: Stable macroscopic fat containing right adrenal myelolipoma measuring 0.9 cm. Stable left adrenal adenoma measuring 1.3 cm. Spleen: Enlarged, measures 13.7 cm. Kidneys: No suspicious mass, obstructing calculus or hydronephrosis. There is atrophic native kidneys. Right lower quadrant renal transplant.  Gastrointestinal tract :Normal appendix. Normal caliber small bowel. Mesentery/peritoneum/retroperitoneum: Small amount of free fluid is present adjacent to the stomach and spleen. There is fluid in the left paracolic gutter with mild adjacent peritoneal thickening. There is a small amount of fluid and small foci of air present within the umbilicus (series 4 image 326.) Lymph nodes: No significant lymphadenopathy. Vasculature: Patent major abdominal vasculature.  Embolization changes in the left upper quadrant. Pelvis: Urinary bladder is normal. Osseous structures: No aggressive or acute osseous lesion.  Extensive degenerative changes of the lumbar spine with multilevel bridging osteophytes. Within the left femoral head there is sclerosis. No subchondral collapse.   Soft tissues: Within normal limits.  Bilateral symmetric gynecomastia.  Diffuse subcutaneous anasarca.  Postsurgical laparotomy changes in the anterior abdominal wall midline.      IMPRESSION: 1. Small amount of residual free fluid is present within the abdomen without any free air to suggest viscus perforation. 2. Stable postsurgical changes of liver and right lower quadrant renal transplants. 3. Postsurgical changes of umbilical hernia repair with small amount of air and fluid in the umbilicus, presumably postsurgical. 4. Increased mild anasarca and moderate left pleural effusion. 5. Splenomegaly. 6. Unchanged avascular necrosis of the left femoral head without subchondral collapse. I have personally reviewed the examination and initial interpretation and I agree with the findings. MARK ALVAREZ MD   SYSTEM ID:  [id number]    XR Abdomen 1 View    Result Date: 12/15/2023  EXAMINATION:  XR ABDOMEN 1 VIEW 12/15/2023 COMPARISON: Normal x-ray 12/12/2023 HISTORY: Hx of hernia repair c/b bowel leak s/p RTOR 12/10 for washout, bowel repair.. TECHNIQUE: Two frontal supine views of the abdomen. FINDINGS: Bilateral lower quadrant drains. Gastric tube tip and  sidehole projects over the stomach. Postsurgical changes of liver transplant with surgical clips visualized. Metallic coils visualized in the left upper quadrant. No abnormally dilated air-filled loops of bowel. No pneumatosis or portal venous gas.     IMPRESSION: Gastric tube tip and sidehole projected over the stomach with non-obstructive bowel gas pattern. I have personally reviewed the examination and initial interpretation and I agree with the findings. ADRIANA BARROS MD   SYSTEM ID:  [id number]    IR CVC Tunnel Placement > 5 Yrs of Age    Result Date: 12/14/2023  Procedure 12/12/2023: Image guided tunneled central venous catheter placement. History: Patient is 68 year old?male?with hx of cirrhosis (EtOH and hemochromatosis) s/p liver transplant 6/6/23, PAF (on apixaban PTA), anemia, pre-diabetes, CAD who underwent a redo mesh repair of umbilical hernia complicated small bowel perforation and abdominal wash out. Due to recent surgical intervention, plan is NPO with PN for at least 7 days, then upper GI. Attempted PICC line placement with vascular access team was unsuccessful. 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 Patient is present for right EJ tunneled catheter placement . Comparison: None. Staff: Dr. Truong Chavarria M.D. Fellow: Ashkan Behzadi M.D. Monitoring/Medications: Patient received moderate sedation and was monitored by the nursing staff under the supervision of the higher attending. Vital signs remained stable throughout the procedure.  50 mcg Fentanyl 1% lidocaine used for local analgesia. Continuous face-to-face sedation time: 25 minutes Fluoro time: 0.9 minutes. Findings/procedure: The patient understood the limitations, alternatives, and risks of the procedure and requested the procedure be performed. Both written and oral consent were obtained. The right neck and upper chest were prepped and draped in the usual sterile fashion. 1% lidocaine without epinephrine was used for local  anesthesia. Preprocedure right neck ultrasound redemonstrated occluded right internal jugular artery. Under ultrasound guidance, external  jugular venotomy was made with a micropuncture needle. Ultrasound image documenting jugular patency and needle venotomy was saved in the patient's record. Micropuncture needle exchanged over guidewire for the micropuncture sheath under fluoroscopic guidance. Catheter length measured with the 0.018 guidewire. Micropuncture sheath saline locked. 6 Romansh Bard double lumen non-cuffed double catheter was subcutaneously tunneled from the right anterior chest to the right external  jugular venotomy site. Catheter cut on the 27 cm marker. Micropuncture sheath exchanged over guidewire for the peel-away sheath. Guidewire removed. Under fluoroscopic guidance, the catheter was placed through the peel-away sheath and positioned with its tip in the superior cavoatrial junction. Lumen flushed and aspirated adequately. Each lumen heparin locked with 100:1 heparin solution. 2-0 nylon catheter retaining suture and sterile dressing applied. Right external jugular venotomy site closed with surgical adhesive. No immediate complication. Fluoroscopic image documenting catheter placement and final position was saved in the patient's record.     IMPRESSION: 1. Ultrasound guided right jugular venotomy. 2. 6 Romansh noncuffed tunneled central venous catheter placed with the tip in the superior cavoatrial junction. Both lumens heparin locked and ready for use. Attestation Signer name: SHREYAS DEAN MD I attest that I supervised the procedure and was immediately available. I reviewed the stored images and agree with the report as written. I have personally reviewed the examination and initial interpretation and I agree with the findings. SHREYAS DEAN MD   SYSTEM ID:  [id number]    XR Abdomen Port 1 View    Result Date: 12/12/2023  EXAMINATION:  XR ABDOMEN PORT 1 VIEW 12/12/2023 COMPARISON:  Abdominal x-ray 12/10/2023 HISTORY: worsening distention. TECHNIQUE: One frontal supine view of the abdomen. FINDINGS: Enteric tube is not visualized. Multiple drains projecting over the lower abdomen and pelvis. No abnormally dilated air-filled loops of bowel.     IMPRESSION: 1. No abnormally dilated air-filled loops of bowel. Paucity of bowel gas. 2. Previously seen enteric tube is not visualized. It not removed consider radiograph of chest and upper abdomen to evaluate for possibility of retracted enteric tube I have personally reviewed the examination and initial interpretation and I agree with the findings. KURTIS JENSEN MD   SYSTEM ID:  [id number]    XR Chest Port 1 View    Result Date: 12/12/2023  EXAM: XR CHEST PORT 1 VIEW  12/12/2023 3:36 PM HISTORY:  picc   COMPARISON:  Chest radiograph 12/10/2023 FINDINGS: Portable upright view of the chest. Presumed interval removal of the endotracheal tube. New right IJ central venous catheter tip projects over the cavoatrial junction. Enteric tube tip and sidehole projected over the stomach. Stable cardiac mediastinal silhouette. Retrocardiac and left basilar opacities. Small left pleural effusion. No pneumothorax. No acute osseous abnormality. Surgical clips and embolization coils in upper abdomen Visualized upper abdomen is unremarkable.      IMPRESSION: 1. New right IJ central venous catheter tip at the cavoatrial junction. Presumed interval extubation. 2. Small left pleural effusions with retrocardiac and left basilar opacity likely atelectasis and/or edema. I have personally reviewed the examination and initial interpretation and I agree with the findings. GIBRAN FRASER MD   SYSTEM ID:  [id number]

## 2023-12-19 NOTE — PROGRESS NOTES
Care Management Follow Up    Length of Stay (days): 11    Expected Discharge Date: 12/20/2023     Concerns to be Addressed: discharge planning  current PT/OT reccomends TCU  Patient plan of care discussed at interdisciplinary rounds: Yes    Anticipated Discharge Disposition: Home     Anticipated Discharge Services: Home CareNone  Anticipated Discharge DME: None    Patient/family educated on Medicare website which has current facility and service quality ratings: no  Education Provided on the Discharge Plan: Yes  Patient/Family in Agreement with the Plan: yes (spouse in agreement)    Referrals Placed by CM/SW: Home care  Private pay costs discussed: Not applicable    Additional Information:  RNCC following as pt has transitioned from TCU to home care recs. Referrals sent to the only home care agencies that service pt's home area per Medicare.gov website. Writer will follow for accepting agency. Per provider, anticipate pt will be able to discharge home tomorrow.     Pending Home Care Referrals:    [facility]    Services Available   Home Hospice, Home Health Services      Address   [address] [address]             Contact Information    [phone]        [facility]    Services Available   Home Health Services, Home Hospice, End-of-Life Care      Address   [address]  [place] [address]             Contact Information    [phone]          [facility]    Services Available   Home Health Services      Address   [address] [address]             Contact Information    [phone]          [facility]    Services Available   Home Health Services      Address   [address] [address]             Contact Information    [phone]          Gabriela Ponce RN   Nurse Coordinator    Covering for: 7A  Phone: *[phone]    Social Work and Care  Management Department       SEARCHABLE in [facility] - search CARE COORDINATOR       [place] ([phone]) Saturday & Sunday; ([phone]) [facility] Recognized Holidays     Units: 5A, 5B & 5C  Pager: [phone]    Units: 6B, 6C & 6D    Pager: [phone]    Units: 7A, 7B, 7C & 7D    Pager: [phone]    Units: 6A & ICU   Pager: [phone]    Units: 5 Ortho, 5MS & WB ED Pager: [phone]    Units: 6MS, 8A & 10 ICU  Pager [phone]

## 2023-12-19 NOTE — PROGRESS NOTES
Transplant Surgery  Inpatient Daily Progress Note  12/19/2023    Assessment & Plan: Damion Quinones is a 68 year old male with a history of PAF, obesity, HTN, pre-diabetes, rheumatoid and psoriatic arthritis, CAD, and cirrhosis (alcohol + hemochromatosis) and 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 He underwent a redo mesh repair of umbilical hernia and minimal DHEERAJ of small bowel 12/7/23 with Dr. Clifton. Presented from home on POD1 after LLQ pain. Now s/p RTOR on 12/10 with repaired enterotomy and abdominal wash out.    Brief plans:   - Stop TPN   - Remove one MAE today   - Stop Flagyl, continue Vanco   - Change Diflucan to PO   - Restart DOAC once all MAE drains removed    s/p 12/7/23 Redo mesh repair of umbilical hernia and minimal DHEERAJ of small bowel; RTOR 12/10/23 Ex lap, repair of SB perforation, abdominal wash out:   - CT scan 12/15 without leak   - Continue Regular diet   - Stop TPN (ate 1600 kcals yesterday)   - Continue Diflucan x 4 weeks (EOT 1/6/24)   - Follow intra-op cultures, + yeast (saccharomyces cerevisiae)     Hx SLK 6/6/23:  Liver: LFTs WNL.   Kidney; PATI secondary to sepsis/SIRS: Cr now back to baseline ~0.8-1. Good UOP.    - Appreciate Transplant nephrology recs.      Immunosuppressed status secondary to medications:   Maintenance:    - PTA Myfortic 540 mg BID   - PTA on Tacrolimus 1.5 mg BID -> SL Tac 0.5 mg BID, now transitioned back to PO 0.5 mg BID. Goal level 6-8. Monitor daily with changes.    - PTA Prednisone 5 mg daily     Neuro:  Peripheral neuropathy: Follows with Neurology. PTA gabapentin, capsaicin cream.  Acute post-op pain: LLQ abdominal pain improving.    - Continue PRN oxycodone (not using), Tylenol, Robaxin     Hematology:   Acute blood loss/Iron deficiency anemia: Hgb 10-12 post-operatively (previously WNL). Monitor.   - Consider 2-3 doses of iron ferrlecit 200 mg IV daily for anemia with mild iron deficiency when out of infection risk.   Chronic  anticoagulation for Afib and DVT/PE ppx: PTA apixaban. Held starting 12/3 prior to procedure, not restarted post procedure.    - Continue high intensity heparin gtt.    - Plan to restart apixiban once drains are out.   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     Cardiorespiratory:   Hx CAD: PTA atorvastatin.  PAF with RVR and hypotension: Cardiology consulted earlier this admission due to uncontrolled afib. Received digoxin x2 then amiodorone gtt, then transitioned to IV metoprolol.    - PTA metoprolol 50 mg BID.   - PTA apixaban on HOLD until MAE drains out per Dr. Clifton. Continue high intensity heparin gtt.   Hypotension; Tachycardia: LA normalized. Likely 2/2 abdominal sepsis. Resolved.  Respiratory insufficiency: Secondary to pulmonary edema. Extubated 12/11 without difficulty. Now breathing comfortably on RA. Resolved.      GI/Nutrition:   At risk for malnutrition: Nutrition consulted. CT with PO contrast on 12/15 without e/o leak. NGT pulled. Diet advanced to regular. Robbie counts demonstrating good PO intake. Stop TPN today.  Diarrhea/Cdiff: C diff positive. Continue PO Vanc. Stop Flagyl.     Endocrine:   Hypothyroidism: Continue PTA levothyroxine.   Steroid/stress induced hyperglycemia: Continue sliding scale insulin.     Fluid/Electrolytes:  Hypomagnesemia: Holding PTA Mag-Ox 800 mg BID with diarrhea.      : See kidney graft function.      Infectious disease:   Intradbominal infection/Sepsis: Improved since RTOR. Lactate improved. Cultures +yeast (Saccharomyces cerevisiae). Afebrile, no leukocytosis. Initially on Zosyn (now stopped)/Fluconazole.    - Continue fluconazole x 4 weeks (EOT 1/6/24)    Cdiff: PCR/Ag +, toxin Neg. Due to symptoms, po vanco and IV flagyl per Dr. Clifton. Stop IV Flagyl today. Plan:    - Oral vancomycin 125 mg qid x 14 days, then 125 mg bid x 14  days and reassess.     Rheum:  Hx Gout: Follows with Rheumatology. PTA allopurinol, prednisone, PRN anakinra restarted 12/16. Received Anakinra 100 mg x2 per Rheumatology.      Prophylaxis: DVT (hep gtt, restart apixiban likely tomorrow), fall, GI (PPI), pneumocystis (Bactrim)     Disposition: 7A, plan for discharge home likely tomorrow.     GEORGE/Fellow/Resident Provider: Lizbeth Greer, NP #[id number]    Faculty: Damion Gibbons MD  __________________________________________________________________  Transplant History: Admitted 12/8/2023 for abdominal pain.   6/6/2023 (Liver), 6/6/2023 (Kidney), Postoperative day: 196 (Liver), 196 (Kidney)     Interval History: History is obtained from the patient.  Overnight events: No acute events overnight. Patient has no complaints. He would like to discharge home.     ROS:   A 10-point review of systems was negative except as noted above.    Curent Meds:   allopurinol  300 mg Oral or Feeding Tube Daily    atorvastatin  40 mg Oral or Feeding Tube QPM    fluconazole  400 mg Oral Daily    gabapentin  100 mg Oral or Feeding Tube BID    gabapentin  200 mg Oral Daily    insulin aspart  1-12 Units Subcutaneous Q4H    levothyroxine  88 mcg Oral or Feeding Tube Daily    metoprolol tartrate  50 mg Oral BID    mycophenolic acid  540 mg Oral BID IS    [START ON 12/20/2023] pantoprazole  40 mg Oral QAM AC    predniSONE  5 mg Oral or Feeding Tube Daily    sodium chloride (PF)  10-40 mL Intracatheter Q8H    sodium chloride (PF)  3 mL Intracatheter Q8H    sulfamethoxazole-trimethoprim  1 tablet Oral or Feeding Tube Daily    tacrolimus  0.5 mg Oral BID IS    vancomycin  125 mg Oral 4x Daily       Physical Exam:     Current Vitals:   /65 (BP Location: Left arm)   Pulse 84   Temp 98.4  F (36.9  C) (Oral)   Resp 17   Wt 104.5 kg (230 lb 6.4 oz)   SpO2 96%   BMI 36.09 kg/m      Vital sign ranges:    Temp:  [97.8  F (36.6  C)-98.5  F (36.9  C)] 98.4  F (36.9  C)  Pulse:  [75-93] 84  Resp:   [16-18] 17  BP: (100-131)/(65-80) 100/65  SpO2:  [94 %-97 %] 96 %  Patient Vitals for the past 24 hrs:   BP Temp Temp src Pulse Resp SpO2 Weight   12/19/23 1022 100/65 98.4  F (36.9  C) Oral 84 17 96 % --   12/19/23 0833 -- -- -- -- -- -- 104.5 kg (230 lb 6.4 oz)   12/19/23 0527 107/80 98.3  F (36.8  C) Oral 93 16 94 % --   12/19/23 0153 105/74 97.8  F (36.6  C) Oral 83 18 96 % --   12/18/23 2232 131/75 98.5  F (36.9  C) Oral 75 18 96 % --   12/18/23 1741 120/76 98.2  F (36.8  C) Oral 92 18 97 % --   12/18/23 1417 127/76 98.3  F (36.8  C) Oral 78 18 96 % --       /65 (BP Location: Left arm)   Pulse 84   Temp 98.4  F (36.9  C) (Oral)   Resp 17   Wt 104.5 kg (230 lb 6.4 oz)   SpO2 96%   BMI 36.09 kg/m      General Appearance: in no apparent distress  Respiratory: unlabored on RA  Cardiovascular: warm, well perfused  GI: abdomen soft, appropriately tender over incision, mildly distended. No rebound or guarding. Incision closed. Drains x2 with s/s output  Skin: warm, dry  Musculoskeletal: BLE mild edema  Neurologic: A&OX4. No confusion  Psychiatric: calm, behavior appropriate to situation    Frailty Scores          12/27/2022 11/22/2022   Frailty Scores   Final Score Frail Frail   Final Score Number 3 4       Data:   CMP  Recent Labs   Lab 12/19/23  1156 12/19/23  0942 12/19/23  0744 12/19/23  0541 12/18/23  0742 12/18/23  0543   NA  --   --   --  133*  --  137   POTASSIUM  --   --   --  4.7  --  4.8   CHLORIDE  --   --   --  102  --  106   CO2  --   --   --  22  --  21*   * 163*   < > 150*   < > 131*   BUN  --   --   --  32.2*  --  31.0*   CR  --   --   --  0.91  --  1.00   GFRESTIMATED  --   --   --  >90  --  83   NAZARIO  --   --   --  8.4*  --  8.5*   MAG  --   --   --  1.7  --  1.8   PHOS  --   --   --  2.8  --  3.0   ALBUMIN  --   --   --  2.7*  --  2.7*   BILITOTAL  --   --   --  0.3  --  0.4   ALKPHOS  --   --   --  95  --  96   AST  --   --   --  19  --  22   ALT  --   --   --  19  --  21    < >  "= values in this interval not displayed.     CBC  Recent Labs   Lab 12/19/23  0541 12/18/23  0543   HGB 11.2* 11.4*   WBC 11.9* 9.6    174     COAGS  No lab results found in last 7 days.    Invalid input(s): \"XA\"     Urinalysis  Recent Labs   Lab Test 12/18/23  1456 12/09/23  1405   COLOR Straw Yellow   APPEARANCE Clear Clear   URINEGLC Negative Negative   URINEBILI Negative Negative   URINEKETONE Negative Negative   SG 1.005 1.022   UBLD Negative Negative   URINEPH 7.0 5.0   PROTEIN Negative 10*   NITRITE Negative Negative   LEUKEST Negative Negative   RBCU 0 2   WBCU 2 6*     Virology:  Hepatitis C Antibody   Date Value Ref Range Status   06/05/2023 Nonreactive Nonreactive Final     BK Virus DNA copies/mL   Date Value Ref Range Status   10/05/2023 Not Detected Not Detected copies/mL Final   09/05/2023 Not Detected Not Detected copies/mL Final   08/29/2023 Not Detected Not Detected copies/mL Final   08/21/2023 Not Detected Not Detected copies/mL Final   08/17/2023 Not Detected Not Detected copies/mL Final   08/01/2023 Not Detected Not Detected copies/mL Final   07/31/2023 Not Detected Not Detected copies/mL Final      Attestation:    The patient has been seen with team and evaluated by me.   Vital signs, labs, medications and orders were reviewed.   When obtained, diagnostic images were reviewed by me and interpreted as above.    The care plan was discussed with the multidisciplinary team and I agree with the findings and plan in this note, with any differences recorded in blue.    Immunosuppressive medication management was reviewed and adjusted as reflected in the note and orders.       Damion Gibbons MD  Professor of Surgery    "

## 2023-12-19 NOTE — PROGRESS NOTES
Care Management Discharge Note    Discharge Date: 12/20/2023       Discharge Disposition: Home, Home Care    Discharge Services: None    Discharge DME: None    Discharge Transportation: family or friend will provide    Private pay costs discussed: Not applicable    Does the patient's insurance plan have a 3 day qualifying hospital stay waiver?  No    PAS Confirmation Code: [id number] (not currently needed)  Patient/family educated on Medicare website which has current facility and service quality ratings: no    Education Provided on the Discharge Plan: Yes  Persons Notified of Discharge Plans: Nabila Peña (spouse), Gabriela Ponce (RN coordinator), Lizbeth Greer, ISABELL, and Selin Cabrera ([facility] intake).   Patient/Family in Agreement with the Plan: yes (spouse in agreement)    Handoff Referral Completed: Yes    Additional Information:  Writer met with Casey and his spouse, Nabila in patient's room. Casey expresses that he is looking forward to getting home. Nabila reports she will be home with Casey to provide additional care and oversight as needed. Casey and Nabila report having everything they need at home and report no concerns with discharging home at this point. PT and OT currently recommending home care. Patient is interested in home care for PT and OT, however, expresses that it may be difficult to secure due to where they live. Nabila and Casey report that if patient cannot secure home care services, they would plan to attend outpatient PT at [facility] in [facility]) if recommended at time of discharge. Casey and Nabila deny any further questions or concerns related to discharge. Writer informed patient to reach out with any further questions.     MOUNIKA Lehman, [facility]  Liver Transplant   [email or web address]  Phone: [phone]  Fax: [phone]

## 2023-12-20 ENCOUNTER — TELEPHONE (OUTPATIENT)
Dept: TRANSPLANT | Facility: CLINIC | Age: 68
End: 2023-12-20
Payer: MEDICARE

## 2023-12-20 ENCOUNTER — APPOINTMENT (OUTPATIENT)
Dept: PHYSICAL THERAPY | Facility: CLINIC | Age: 68
End: 2023-12-20
Payer: COMMERCIAL

## 2023-12-20 VITALS
OXYGEN SATURATION: 97 % | SYSTOLIC BLOOD PRESSURE: 120 MMHG | DIASTOLIC BLOOD PRESSURE: 73 MMHG | HEART RATE: 89 BPM | TEMPERATURE: 98.4 F | BODY MASS INDEX: 36.09 KG/M2 | WEIGHT: 230.4 LBS | RESPIRATION RATE: 18 BRPM

## 2023-12-20 DIAGNOSIS — Z94.4 LIVER TRANSPLANT RECIPIENT (H): ICD-10-CM

## 2023-12-20 DIAGNOSIS — Z94.4 LIVER REPLACED BY TRANSPLANT (H): Primary | ICD-10-CM

## 2023-12-20 DIAGNOSIS — Z98.890 S/P HERNIORRHAPHY: ICD-10-CM

## 2023-12-20 DIAGNOSIS — Z94.0 KIDNEY REPLACED BY TRANSPLANT: ICD-10-CM

## 2023-12-20 DIAGNOSIS — Z87.19 S/P HERNIORRHAPHY: ICD-10-CM

## 2023-12-20 LAB
ALBUMIN SERPL BCG-MCNC: 2.6 G/DL (ref 3.5–5.2)
ALP SERPL-CCNC: 99 U/L (ref 40–150)
ALT SERPL W P-5'-P-CCNC: 20 U/L (ref 0–70)
ANION GAP SERPL CALCULATED.3IONS-SCNC: 9 MMOL/L (ref 7–15)
AST SERPL W P-5'-P-CCNC: 19 U/L (ref 0–45)
BILIRUB SERPL-MCNC: 0.3 MG/DL
BUN SERPL-MCNC: 30.4 MG/DL (ref 8–23)
CALCIUM SERPL-MCNC: 8.6 MG/DL (ref 8.8–10.2)
CHLORIDE SERPL-SCNC: 102 MMOL/L (ref 98–107)
CREAT SERPL-MCNC: 0.98 MG/DL (ref 0.67–1.17)
DEPRECATED HCO3 PLAS-SCNC: 21 MMOL/L (ref 22–29)
EGFRCR SERPLBLD CKD-EPI 2021: 85 ML/MIN/1.73M2
ERYTHROCYTE [DISTWIDTH] IN BLOOD BY AUTOMATED COUNT: 17.2 % (ref 10–15)
GLUCOSE SERPL-MCNC: 119 MG/DL (ref 70–99)
HCT VFR BLD AUTO: 36.9 % (ref 40–53)
HGB BLD-MCNC: 11.1 G/DL (ref 13.3–17.7)
MAGNESIUM SERPL-MCNC: 1.6 MG/DL (ref 1.7–2.3)
MCH RBC QN AUTO: 28.7 PG (ref 26.5–33)
MCHC RBC AUTO-ENTMCNC: 30.1 G/DL (ref 31.5–36.5)
MCV RBC AUTO: 95 FL (ref 78–100)
PHOSPHATE SERPL-MCNC: 3.2 MG/DL (ref 2.5–4.5)
PLATELET # BLD AUTO: 210 10E3/UL (ref 150–450)
POTASSIUM SERPL-SCNC: 5.2 MMOL/L (ref 3.4–5.3)
PROT SERPL-MCNC: 5.1 G/DL (ref 6.4–8.3)
RBC # BLD AUTO: 3.87 10E6/UL (ref 4.4–5.9)
SODIUM SERPL-SCNC: 132 MMOL/L (ref 135–145)
TACROLIMUS BLD-MCNC: 8.9 UG/L (ref 5–15)
TME LAST DOSE: NORMAL H
TME LAST DOSE: NORMAL H
UFH PPP CHRO-ACNC: 0.37 IU/ML
WBC # BLD AUTO: 11.4 10E3/UL (ref 4–11)

## 2023-12-20 PROCEDURE — 83735 ASSAY OF MAGNESIUM: CPT | Performed by: NURSE PRACTITIONER

## 2023-12-20 PROCEDURE — 85027 COMPLETE CBC AUTOMATED: CPT | Performed by: STUDENT IN AN ORGANIZED HEALTH CARE EDUCATION/TRAINING PROGRAM

## 2023-12-20 PROCEDURE — 84100 ASSAY OF PHOSPHORUS: CPT | Performed by: NURSE PRACTITIONER

## 2023-12-20 PROCEDURE — 97110 THERAPEUTIC EXERCISES: CPT | Mod: GP

## 2023-12-20 PROCEDURE — 36591 DRAW BLOOD OFF VENOUS DEVICE: CPT | Performed by: NURSE PRACTITIONER

## 2023-12-20 PROCEDURE — 250N000012 HC RX MED GY IP 250 OP 636 PS 637: Performed by: NURSE PRACTITIONER

## 2023-12-20 PROCEDURE — 250N000013 HC RX MED GY IP 250 OP 250 PS 637

## 2023-12-20 PROCEDURE — 250N000013 HC RX MED GY IP 250 OP 250 PS 637: Performed by: NURSE PRACTITIONER

## 2023-12-20 PROCEDURE — 250N000011 HC RX IP 250 OP 636: Performed by: NURSE PRACTITIONER

## 2023-12-20 PROCEDURE — 80053 COMPREHEN METABOLIC PANEL: CPT | Performed by: STUDENT IN AN ORGANIZED HEALTH CARE EDUCATION/TRAINING PROGRAM

## 2023-12-20 PROCEDURE — 80197 ASSAY OF TACROLIMUS: CPT | Performed by: NURSE PRACTITIONER

## 2023-12-20 PROCEDURE — 99233 SBSQ HOSP IP/OBS HIGH 50: CPT | Mod: 24 | Performed by: INTERNAL MEDICINE

## 2023-12-20 PROCEDURE — 85520 HEPARIN ASSAY: CPT | Performed by: SURGERY

## 2023-12-20 PROCEDURE — 250N000012 HC RX MED GY IP 250 OP 636 PS 637

## 2023-12-20 RX ORDER — TACROLIMUS 1 MG/1
1 CAPSULE ORAL EVERY 12 HOURS
Start: 2023-12-20 | End: 2024-02-05

## 2023-12-20 RX ORDER — FLUCONAZOLE 200 MG/1
400 TABLET ORAL DAILY
Qty: 32 TABLET | Refills: 0 | Status: SHIPPED | OUTPATIENT
Start: 2023-12-21 | End: 2024-01-06

## 2023-12-20 RX ORDER — TACROLIMUS 0.5 MG/1
0.5 CAPSULE ORAL EVERY 12 HOURS
Qty: 180 CAPSULE | Refills: 3 | Status: SHIPPED | OUTPATIENT
Start: 2023-12-20 | End: 2024-02-05

## 2023-12-20 RX ORDER — TACROLIMUS 0.5 MG/1
0.5 CAPSULE ORAL EVERY 12 HOURS
Start: 2023-12-20 | End: 2023-12-20

## 2023-12-20 RX ORDER — VANCOMYCIN HYDROCHLORIDE 125 MG/1
CAPSULE ORAL
Qty: 60 CAPSULE | Refills: 0 | Status: SHIPPED | OUTPATIENT
Start: 2023-12-20 | End: 2024-01-11

## 2023-12-20 RX ORDER — FUROSEMIDE 20 MG
40 TABLET ORAL DAILY
Qty: 30 TABLET | Refills: 0 | Status: SHIPPED | OUTPATIENT
Start: 2023-12-20 | End: 2024-01-04

## 2023-12-20 RX ADMIN — ACETAMINOPHEN 650 MG: 325 TABLET, FILM COATED ORAL at 02:53

## 2023-12-20 RX ADMIN — VANCOMYCIN HYDROCHLORIDE 125 MG: 125 CAPSULE ORAL at 08:01

## 2023-12-20 RX ADMIN — ALLOPURINOL 300 MG: 300 TABLET ORAL at 08:01

## 2023-12-20 RX ADMIN — FUROSEMIDE 40 MG: 10 INJECTION, SOLUTION INTRAMUSCULAR; INTRAVENOUS at 07:59

## 2023-12-20 RX ADMIN — SULFAMETHOXAZOLE AND TRIMETHOPRIM 1 TABLET: 400; 80 TABLET ORAL at 08:01

## 2023-12-20 RX ADMIN — FLUCONAZOLE 400 MG: 200 TABLET ORAL at 08:01

## 2023-12-20 RX ADMIN — TACROLIMUS 0.5 MG: 0.5 CAPSULE ORAL at 08:01

## 2023-12-20 RX ADMIN — VANCOMYCIN HYDROCHLORIDE 125 MG: 125 CAPSULE ORAL at 11:30

## 2023-12-20 RX ADMIN — LEVOTHYROXINE SODIUM 88 MCG: 88 TABLET ORAL at 08:01

## 2023-12-20 RX ADMIN — MYCOPHENOLIC ACID 540 MG: 360 TABLET, DELAYED RELEASE ORAL at 08:07

## 2023-12-20 RX ADMIN — PANTOPRAZOLE SODIUM 40 MG: 40 TABLET, DELAYED RELEASE ORAL at 08:01

## 2023-12-20 RX ADMIN — PREDNISONE 5 MG: 5 TABLET ORAL at 08:01

## 2023-12-20 RX ADMIN — METOPROLOL TARTRATE 50 MG: 50 TABLET, FILM COATED ORAL at 08:01

## 2023-12-20 RX ADMIN — GABAPENTIN 100 MG: 100 CAPSULE ORAL at 07:58

## 2023-12-20 ASSESSMENT — ACTIVITIES OF DAILY LIVING (ADL)
ADLS_ACUITY_SCORE: 28

## 2023-12-20 NOTE — CARE PLAN
/79 (BP Location: Right arm, Cuff Size: Adult Regular)   Pulse 84   Temp 97.5  F (36.4  C) (Oral)   Resp 18   Wt 104.5 kg (230 lb 6.4 oz)   SpO2 96%   BMI 36.09 kg/m        Shift: [id number]  Isolation Status: Contact VRE  VS: VSS on RA, afebrile  Neuro: Aox4  Behaviors: cooperative  Respiratory: diminished lung sounds at bases  Cardiac: WDL  Pain/Nausea: denies  Diet: Regular  IV Access: RCVC, LPIV  Infusion(s): Heparin @ 2450 units/hr, TPN @ 45 ml/hr  Lines/Drains: LJP drain  GI/: voiding without difficulty, 1 BM today   Skin: upper arm bruising  Mobility: Assist x1 w/ walker  Plan: continue plan of care

## 2023-12-20 NOTE — PROGRESS NOTES
[facility]   Transplant Nephrology Progress Note  Date of Admission:  12/8/2023  Today's Date: 12/20/2023    Recommendations:  - No acute indications for dialysis.  - Would discharge patient on furosemide 40 mg po daily.  - Patient should weigh himself daily and if weight decreases more than 4 lbs, he should decrease furosemide to 20 mg daily and contact transplant coordinator.    Assessment & Plan   # DDKT (SLK): Stable creatinine at baseline.  Good urine output.  No acute indications for dialysis.   - PATI felt secondary to hypotension and sepsis with probable ATN.    - Baseline Creatinine: ~ 0.8-1.0   - Proteinuria: Normal (<0.2 grams)   - Date DSA Last Checked: Aug/2023      Latest DSA: No   - BK Viremia: No   - Kidney Tx Biopsy: Jun 20, 2023; Result: No diagnostic evidence of acute rejection.   Focal acute tubular necrosis.  Mild arterial and arteriolar sclerosis.    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  Transaminases Stable.  Followed by Transplant Surgery.     # Immunosuppression: Tacrolimus immediate release (goal 6-8), Mycophenolic acid (dose 540 mg every 12 hours), and Prednisone (dose 5 mg daily)   - Patient is in an immunosuppressed state and will continue to monitor for efficacy and toxicity of immunosuppression medications.   - Changes: Not at this time     # Infection Prophylaxis:   - PJP: Sulfa/TMP (Bactrim)  - CMV: None, prophylaxis completed; CMV IgG Ab positive  - Fungal: Fluconazole (Diflucan)    # Hypertension: Controlled;  Goal BP: < 140/90 (Hospitalization goal)   - Volume status: Mildly hypervolemic  EDW ~ 225 lbs   - Changes: Yes - Would discharge on furosemide 40 mg po daily.    # Elevated Blood Glucose: Glucose generally running ~ 110s   - Management as per primary team.    # Anemia in Chronic Renal Disease: Hgb: Stable      FEDERICO: No   - Iron studies: Low iron  saturation; Would consider course of IV iron when patient is out of infection risk concern.    # Mineral Bone Disorder:   - Secondary renal hyperparathyroidism; PTH level: Normal (15-65 pg/ml)        On treatment: None  - Vitamin D; level: Normal        On supplement: No  - Calcium; level: Normal when corrected for low serum albumin        On supplement: No  - Phosphorus; level: Normal        On supplement: No    # Electrolytes:   - Potassium; level: High normal        On supplement: No  - Magnesium; level: Stable low        On supplement: No  - Bicarbonate; level: Stable low        On supplement: No    # Umbilical Hernia Mesh Repair: Patient is s/p scheduled redo mesh repair of umbilical hernia and minimal DHEERAJ of small bowel 12/7/23. Patient then represented 12/8/23 from home with severe LLQ abdominal pain and N/V 12/8/23.  Back to OR 12/10 and found to have bowel leak and repaired.  On broad spectrum-antibiotics and antifungal.    # CAD, s/p PCI: Asymptomatic.     # Paroxysmal A-Fib with Episodic RVR: Patient had episode of unresponsiveness 12/8/23- possible seizure vs apnea. Extensive workup with evidence of A fib w RVR, s/p PO dose of metoprolol.  Received Digoxin 500 mcg given IV once and subsequent dose of 250mcg on 12/9.  On apixaban PTA.  A. fib felt to be driven by septic shock. Started on amiodarone gtt on 12/9.  Presently in A. fib, but rate controlled.  Anticoagulation plan is apixaban.    # Obesity, Class II (BMI = 39.7): Trend up in weight, likely some volume related following surgery.   - Once patient heals and recovers from surgery, would recommend working on weight loss for overall health by increasing exercise and watching caloric intake.    # Clostridium difficile Colitis: Patient with h/o C. diff and likely colonized, but has had worsening diarrhea and on broad-spectrum antibiotics making him susceptible.   - Agree with plan to start oral vancomycin and would treat with 125 mg qid x 14 days, then  125 mg bid x 14 days and reassess.    # Peripheral Neuropathy: Stable on gabapentin.     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  Previously treated with rituximab x 2.     # Hx of Gout: PTA on prednisone 5mg daily and allopurinol 300mg daily.  Also takes anakinra 100 mg with gout symptoms.  Had gout flare, but symptoms improved.  Allopurinol now restarted and received anakinra.  Rheumatology following.    # Transplant History:  Etiology of Kidney Failure: IgA nephropathy and ANCA vasculitis  Tx: DDKT (K) and Liver Tx (K)  Transplant: 6/6/2023 (Liver), 6/6/2023 (Kidney)  Significant changes in immunosuppression: None  Significant transplant-related complications: CMV Viremia and DGF    Recommendations were communicated to the primary team via this note.    Jeovany Scott MD  Transplant Nephrology  Contact information available via [facility] Paging/Directory    Interval History   Mr. Miranda creatinine is 0.98 (12/20 0615); Stable.  Good urine output.  Stable, normal transaminases.  Other significant labs/tests/vitals: Stable electrolytes.  Stable hemoglobin.  No new events overnight.  No chest pain or shortness of breath.  Improved leg swelling.  No nausea and vomiting.  Bowel movements are formed.  No fever, sweats or chills.    Review of Systems   4 point ROS was obtained and negative except as noted in the Interval History.    MEDICATIONS:   allopurinol  300 mg Oral or Feeding Tube Daily    apixaban ANTICOAGULANT  5 mg Oral BID    atorvastatin  40 mg Oral or Feeding Tube QPM    fluconazole  400 mg Oral Daily    gabapentin  100 mg Oral or Feeding Tube BID    gabapentin  200 mg Oral Daily    levothyroxine  88 mcg Oral or Feeding Tube Daily    metoprolol tartrate  50 mg Oral BID    mycophenolic acid  540 mg Oral BID IS    pantoprazole  40 mg Oral QAM AC    predniSONE  5 mg Oral or Feeding Tube Daily    sodium chloride (PF)  3 mL Intracatheter Q8H    sulfamethoxazole-trimethoprim  1 tablet Oral or Feeding  Tube Daily    tacrolimus  0.5 mg Oral BID IS    vancomycin  125 mg Oral 4x Daily      dextrose         Physical Exam   Temp  Av.1  F (36.7  C)  Min: 96.7  F (35.9  C)  Max: 100.1  F (37.8  C)  Arterial Line BP  Min: 70/55  Max: 158/122  Arterial Line MAP (mmHg)  Av.2 mmHg  Min: 61 mmHg  Max: 207 mmHg      Pulse  Av  Min: 60  Max: 147 Resp  Av  Min: 9  Max: 36  FiO2 (%)  Av.9 %  Min: 40 %  Max: 50 %  SpO2  Av.9 %  Min: 85 %  Max: 100 %     /73 (BP Location: Right arm)   Pulse 89   Temp 98.4  F (36.9  C) (Oral)   Resp 18   Wt 104.5 kg (230 lb 6.4 oz)   SpO2 97%   BMI 36.09 kg/m     Date 23 0700 - 23 0659   Shift 6191-3548 1573-4982 5233-9174 24 Hour Total   INTAKE   I.V. 545.28   545.28      160   Shift Total(mL/kg) 705.28(6.33)   705.28(6.33)   OUTPUT   Urine 225   225   Shift Total(mL/kg) 225(2.02)   225(2.02)   Weight (kg) 111.5 111.5 111.5 111.5      Admit Weight: 111.5 kg (245 lb 13 oz)     GENERAL APPEARANCE: alert and no distress  HENT: mouth without ulcers or lesions  RESP: lungs clear to auscultation from anterior - no rales, rhonchi or wheezes  CV: irregular rhythm and rate, no rub, no murmur  EDEMA: 1+ LE edema bilaterally  ABDOMEN: soft, nondistended, mild TTP, bowel sounds normal, obese  MS: extremities normal - no gross deformities noted, no evidence of inflammation in joints, no muscle tenderness  SKIN: no rash  TX KIDNEY: normal  DIALYSIS ACCESS:  None    Data   All labs reviewed by me.  CMP  Recent Labs   Lab 23  0615 23  1156 23  0942 23  0744 23  0541 23  0742 23  0543 23  0541 23  0537   *  --   --   --  133*  --  137  --  138   POTASSIUM 5.2  --   --   --  4.7  --  4.8  --  3.9   CHLORIDE 102  --   --   --  102  --  106  --  104   CO2 21*  --   --   --  22  --  21*  --  24   ANIONGAP 9  --   --   --  9  --  10  --  10   * 168* 163* 183* 150*   < > 131*   < > 150*   BUN  30.4*  --   --   --  32.2*  --  31.0*  --  32.7*   CR 0.98  --   --   --  0.91  --  1.00  --  0.94   GFRESTIMATED 85  --   --   --  >90  --  83  --  89   NAZARIO 8.6*  --   --   --  8.4*  --  8.5*  --  8.3*   MAG 1.6*  --   --   --  1.7  --  1.8  --  2.0   PHOS 3.2  --   --   --  2.8  --  3.0  --  2.9   PROTTOTAL 5.1*  --   --   --  5.0*  --  5.2*  --  4.9*   ALBUMIN 2.6*  --   --   --  2.7*  --  2.7*  --  2.6*   BILITOTAL 0.3  --   --   --  0.3  --  0.4  --  0.3   ALKPHOS 99  --   --   --  95  --  96  --  92   AST 19  --   --   --  19  --  22  --  16   ALT 20  --   --   --  19  --  21  --  22    < > = values in this interval not displayed.     CBC  Recent Labs   Lab 12/20/23  0615 12/19/23  0541 12/18/23  0543 12/17/23  1724 12/17/23  0537   HGB 11.1* 11.2* 11.4* 10.4* 9.8*   WBC 11.4* 11.9* 9.6  --  9.6   RBC 3.87* 3.91* 3.97*  --  3.46*   HCT 36.9* 37.3* 38.1*  --  32.3*   MCV 95 95 96  --  93   MCH 28.7 28.6 28.7  --  28.3   MCHC 30.1* 30.0* 29.9*  --  30.3*   RDW 17.2* 17.2* 17.0*  --  17.2*    198 174  --  148*     INR  No lab results found in last 7 days.    ABG  No lab results found in last 7 days.     Urine Studies  Recent Labs   Lab Test 12/18/23  1456 12/09/23  1405 11/07/23  0753 09/27/23  1157 09/25/23  0759 09/20/23  1645 08/29/23  1023 08/04/23  0715   COLOR Straw Yellow Yellow Dark Yellow* Yellow Yellow   < > Yellow   APPEARANCE Clear Clear Clear Clear Clear Clear   < > Clear   URINEGLC Negative Negative Negative 50* Negative 100*   < > Negative   URINEBILI Negative Negative Small* Small* Small* Negative   < > Negative   URINEKETONE Negative Negative Negative Negative Negative Negative   < > Negative   SG 1.005 1.022 1.015 1.029 1.020 1.015   < > 1.010   UBLD Negative Negative Negative Negative Negative Negative   < > Negative   URINEPH 7.0 5.0 7.0 6.0 7.0 7.0   < > 6.5   PROTEIN Negative 10* Trace* 70* 30* Negative   < > Negative   UROBILINOGEN  --   --  0.2  --  0.2 0.2  --  0.2   NITRITE  Negative Negative Negative Negative Negative Negative   < > Negative   LEUKEST Negative Negative Trace* Negative Negative Negative   < > Small*   RBCU 0 2 0-2 1 None Seen  --   --  0-2   WBCU 2 6* 10-25* 7* None Seen  --   --  0-5    < > = values in this interval not displayed.     No lab results found.  PTH  Recent Labs   Lab Test 09/20/23  1611 08/01/23  0924 05/19/23  0956   PTHI 24 45 67*     Iron Studies  Recent Labs   Lab Test 11/21/23  0742 11/07/23  0753 10/05/23  0752 09/05/23  0702 08/07/23  0726 08/01/23  0924 11/22/22  0848   IRON 60* 53* 83 32* 28* 23* 68    226* 241 258 244 231* 219*   IRONSAT 22 23 34 12* 11* 10* 31   HOLA 204 341 919* 485* 440* 506* 429*       IMAGING:  All imaging studies reviewed by me.

## 2023-12-20 NOTE — PLAN OF CARE
Goal Outcome Evaluation:  /73 (BP Location: Left arm)   Pulse 94   Temp 98.6  F (37  C) (Oral)   Resp 17   Wt 104.5 kg (230 lb 6.4 oz)   SpO2 94%   BMI 36.09 kg/m      Shift: 8359-9790  Isolation Status: Contact + enteric precautions   VS: Vitals stable, room air, afebrile  Neuro: Alert and oriented x4  BG: None   Labs: Awaiting AM labs   Pain/Nausea: Denies nausea, pain managed by prn tylenol x1, simethicone x1   Diet: Regular, betsy counts   IV Access: R chest port, PIV x1   Infusion(s): Heparin at 23.5   GI/: Voiding adequately, LBM 12/19   Skin: Midline abdominal incision with dressing CDI, MAE drain x1   Mobility: Assist x1   Plan: Continue with POC and notify team with any changes

## 2023-12-20 NOTE — PROGRESS NOTES
Calorie Count  Intake recorded for: 12/19  Total Kcals: 1733 Total Protein: 94g  Kcals from Hospital Food: 1733  Protein: 94g  Kcals from Outside Food (average):0 Protein: 0g  # Meals Ordered from Kitchen: 2 meals   # Meals Recorded: 2 meals (First - 100% scrambled eggs, 2 slices toast w/ 3 peanut butters, 1 jelly, 3 milks)      (Second - 100% 2 ice creams, 3 milks, 50% penne pasta w/ lidya sauce)   # Supplements Recorded: 0

## 2023-12-20 NOTE — TELEPHONE ENCOUNTER
Alex called to review a couple things on Casey's discharge instructions. Instructed Casey to just take 0.5 mg tacrolimus BID as he is on fluconazole for 4 weeks. Explained that fluconazole increases tac levels therefore a lower dose should be sufficient. Alex verbalized understanding.

## 2023-12-20 NOTE — PROGRESS NOTES
Care Management Discharge Note    Discharge Date: 12/20/2023       Discharge Disposition: Home    Discharge Services: Home Care    Discharge DME: None    Discharge Transportation: family or friend will provide    Private pay costs discussed: Not applicable    Does the patient's insurance plan have a 3 day qualifying hospital stay waiver?  No    PAS Confirmation Code: [id number]  Patient/family educated on Medicare website which has current facility and service quality ratings: no    Education Provided on the Discharge Plan: Yes  Persons Notified of Discharge Plans: Pt, spouse  Patient/Family in Agreement with the Plan: yes (spouse in agreement)    Handoff Referral Completed: Yes    Additional Information:  RNCC notified pt medically ready for discharge today. Current recommendations are for home care. [employer] is accepting company and can pull orders from [email or web address]. Home care order placed.  Pt will need prior authorization from insurance and due to the holiday Monday, start of care will be delayed. Pt and spouse confirmed they are comfortable with this arrangement. Pt's spouse will transport home. No further care management needs at this time.     [facility]    Services Available   Home Health Services, Home Hospice, End-of-Life Care      Address   [address] [place] [address] [address]  [place] [id number]             Contact Information    [phone]          Gabriela Ponce, LORIN   Nurse Coordinator    Covering for: 7A  Phone: *[phone]    Social Work and Care Management Department       SEARCHABLE in [facility] - search CARE COORDINATOR       [employer] [place] ([id number]) Saturday & Sunday; ([phone]) [facility] Recognized Holidays     Units: 5A, 5B & 5C  Pager: [phone]    Units: 6B, 6C & 6D    Pager: [phone]    Units: 7A, 7B, 7C & 7D    Pager: [phone]    Units: 6A & ICU   Pager: [phone]    Units: 5 Ortho, 5MS & WB ED Pager: [phone]    Units: 6MS, 8A & 10 ICU  Pager [phone]

## 2023-12-20 NOTE — PROVIDER NOTIFICATION
"pt Damion Quinones. \"Casey\" [age] is having abdominal cramps. Can you order some meds that can help with this?   "

## 2023-12-20 NOTE — PLAN OF CARE
Physical Therapy Discharge Summary    Reason for therapy discharge:    Discharged to home with home therapy.    Progress towards therapy goal(s). See goals on Care Plan in [facility] electronic health record for goal details.  Goals partially met.  Barriers to achieving goals:   discharge from facility.    Therapy recommendation(s):    Continued therapy is recommended.  Rationale/Recommendations:  Recommend pt continue to improve remaining impairments with HH PT.

## 2023-12-20 NOTE — PROGRESS NOTES
Received a message from inpatient GEORGE that Casey will be discharging home with home care today and that Dr. Clifton wants to see him on 12/26 in clinic. Appointment request placed.

## 2023-12-20 NOTE — DISCHARGE INSTRUCTIONS
[facility]    Services Available   Home Health Services, Home Hospice, End-of-Life Care      Address   [address]  [facility] [place] [place] [address]             Contact Information    [phone]      Start of care pending prior authorization from insurance. Home RN, PT, OT.

## 2023-12-20 NOTE — TELEPHONE ENCOUNTER
[facility] that [facility] need a new prescription for tacrolimus sent. On discharge the prescription was not sent to the pharmacy, likely because Casey did not need it filled. Updated tacrolimus prescription and sent to pharmacy to have on file for future refills.

## 2023-12-21 NOTE — PLAN OF CARE
Physical Therapy Discharge Summary    Reason for therapy discharge:    Discharged to home with outpatient therapy.    Progress towards therapy goal(s). See goals on Care Plan in [facility] electronic health record for goal details.  Goals met    Therapy recommendation(s):    Continued therapy is recommended.  Rationale/Recommendations:  Pt would benefit from OP PT to further progress functional strength and endurance.

## 2023-12-25 NOTE — PROGRESS NOTES
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 PMHx also includes CAD, alcohol overuse, obesity, psoriatic arthritis, paroxysmal atrial fibrillation, history of DVT and HTN.    #. 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 HCC noted on explant: moderately differentiated, 2.8cm. RETREAT SCORE: 1 (2.8cm tumor, AFP < 20, no vascular invasion)  #. Iron deficiency anemia  #. Atrial fibrillation + h/o DVT: on apixiban  Patient is doing well post liver and kidney transplant.     His renal function is elevated at 1.66 in the setting of recent C. difficile infection on vancomycin, fluconazole use and slightly decreased appetite; pending repeat tacrolimus trough - unable to obtain today given hemolyzed specimens. Patient also on lasix 20mg daily for fluid management.     His AST and ALT are also slightly elevated to 30-50s with ALP of 406 in the context of recent infection. Patient underwent a CT abdomen pelvis today; pending read. Will plan for repeat liver studies in 2 days and if worsening, will consider a liver biopsy    Plan  -- Follow up CT abdomen pelvis done today  -- Tacrolimus immediate release (goal 6-8), Mycophenolic acid 540mg BID and prednisone 5 mg daily; monitoring of immunosuppression for efficacy and toxicity per protocol. Immunosuppression driven by nephrology given SLK.   -- Continue statin given CAD; off aspirin given on apixiban per cardiology   -- HCC surveillance given HCC on explant: CT chest + CT abdomen/pelvis and AFP every 6 months (due 6/2023)  -- Prophylaxis   * PJP: Sulfa/TMP (Bactrim) indefinitely    * CMV: Valganciclovir (Valcyte)  -- Okay to  stop ursodiol at this time  -- Okay to reduce pantoprazole to 20mg daily, 30 minutes before meals  -- Continue complete alcohol abstinence  -- Okay to continue IV iron infusions (1 out of 3 done) after vancomycin ended   -- Endocrinology referral for hypothyroidism and avascular necrosis of his femoral head (left)    Health Maintenance:  -- CRC Surveillance: Repeat due in 2026  -- Skin Checks: due 6/2024 (given due yearly)  -- Lipids: due yearly  -- Vaccinations and health screening per PCP     RTC: 6 months    Lolis Bermudez MD (Lizzie)   of Medicine  Advanced & Transplant Hepatology  [facility]    I spent 45 minutes on this encounter performing the following: reviewing the patient's medical record (clinic visits, hospital records, lab results, imaging and procedural documentation), history taking, physical exam and documentation on the date of the encounter. I also spent part of the time in coordination of care and counseling.    HPI:  DBD LT (and kidney) 6/6/2023 for ETOH + hemochromatosis (homozygous H63D) cirrhosis  - operation: Caval replacement. Portal vein end-to-end anastomosis. Hepatic artery: there were 2 hepatic arteries the right and left; the right is connected to the splenic artery stump. Bile duct end-to-end anastomosis  - explant: 2.8cm HCC (moderately differentiated with no vascular or extra-hepatic extension) in the setting of MASLD cirrhosis  - post-op course   * RTOR on 6/6/203 with concern for low flow in the renal graft - ex-lap, washout, closure with healthy appearing graft with intact arterial and venous flow.    * RTOR on 12/7/23 for hernia repair -> 12/10/2023 for small bowel perforation repair (+Saccharomyces cerevisiae - treated with fluconazole)   * 12/2023: C. Diff in the setting of hospitalization   - immunosuppression: Tacrolimus + MMF + prednisone    12/7/2023: repair of umbilical hernia and minimal lysis of adhesions around small  bowel    Admitted from 12/8-12/20/2023: RTOR for 12/10/2023 - repair of small bowel perforation + abdominal washout. Abdominal cultures +Saccharomyces cerevisiae. Fluconazole x 4 weeks with end of treatment: 1/6/2024. Discharged on Lasix 40mg daily. C diff PCR/Ag+ but toxin negative - per Dr. Clifton was started on Oral vancomycin 125 mg qid x 14 days (12/14-12/27), then 125 mg bid x 14 days (12/28-1/10/2024).    Patient reports doing generally well since his recent admission.  He denies any recent falls or balance issues.  He denies any chest pain or shortness of breath with exertion.  He denies any fevers or cough.  He reports that he is eating well and that his weight has been generally stable as he has been trying to be more conscious of his food decisions.    His abdominal pain is being well-controlled with as needed medications.  He denies any diarrhea or constipation.  He is having 2-3 formed bowel movements per day.  He is still on his vancomycin which was recently switched to twice daily today and he will continue this through January 10, 2024.  He denies any melena or hematochezia.  He denies any nausea or vomiting.    He is currently on tacrolimus, CellCept and prednisone 5 mg/day.  He recently had a gout flare and took Kineret.    He is currently on Lasix 40 mg daily.  He reports mild ankle edema.  His neuropathy is currently being managed with gabapentin which she takes 100 mg in the morning, 200 mg midday and 100 mg in the evening.    Medical hx Surgical hx   Past Medical History:   Diagnosis Date    Alcoholic cirrhosis of liver with ascites (H) 10/11/2019    ANCA-associated vasculitis (H) 2022    Antiplatelet or antithrombotic long-term use     Avascular necrosis (H)     Left femoral head    C. difficile diarrhea     Coronary artery disease     [facility] 4/2023 - complete occlusion of RCA    ESRD (end stage renal disease) on dialysis (H)     Gout     HCC (hepatocellular carcinoma) (H) 09/05/2023     History of hemochromatosis 10/11/2019    Hypertension     Hypothyroidism 12/19/2023    IgA nephropathy     Obesity     PAF (paroxysmal atrial fibrillation) (H)     Pre-diabetes 2023    Psoriatic arthritis (H)     RA (rheumatoid arthritis) (H)     Status post kidney transplant 06/06/2023    Induction with thymoglobulin 4mg/kg, + DSA CW9    Status post liver transplantation (H) 06/06/2023      Past Surgical History:   Procedure Laterality Date    APPENDECTOMY      Removed at 18 Years Old     BENCH KIDNEY  06/06/2023    Procedure: Bench kidney;  Surgeon: Chad Clifton MD;  Location: [facility] OR    BENCH LIVER  06/06/2023    Procedure: Bench liver;  Surgeon: Chad Clifton MD;  Location:  OR    COLONOSCOPY      2014 at [facility]     CV CORONARY ANGIOGRAM N/A 04/27/2023    Procedure: Coronary Angiogram;  Surgeon: Pablo Araujo MD;  Location:  HE[facility] CARDIAC CATH LAB    EXPLORE GROIN N/A 12/10/2023    Procedure: Umbilical wound exploration, incision and drainage of abcess, exploratory laparotomy, mesh excision, small bowel primary repair;  Surgeon: Chad Clifton MD;  Location: [facility] OR    EYE SURGERY Bilateral     Cataract    H STATISTIC PICC LINE INSERTION >5YR, FAILED Left 06/16/2023    Unable to advance catheter over the axillary area    H STATISTIC PICC LINE INSERTION >5YR, FAILED      HERNIA REPAIR      History of bilateral inguinal hernia repair: 10/28/2014. Open hernia repair: 10/2017. Abdominal wound exploration and debridement 12/27/2017    HERNIORRHAPHY UMBILICAL N/A 12/07/2023    Procedure: HERNIORRHAPHY, UMBILICAL, OPEN, WITH MESH;  Surgeon: Chad Clifton MD;  [facility]    INSERT SHUNT PORTAL TRANSJUGULAR INTRAHEPTIC  09/26/2022    IR CVC NON TUNNEL LINE REMOVAL  07/03/2023    IR CVC NON TUNNEL PLACEMENT > 5 YRS  06/14/2023    IR CVC TUNNEL PLACEMENT > 5 YRS OF AGE  01/26/2023    IR CVC TUNNEL PLACEMENT > 5 YRS OF AGE  12/12/2023    IR PICC PLACEMENT > 5 YRS  OF AGE  2023    IR RENAL BIOPSY RIGHT  2023    picc failed attempt Right 2023    PICC failed attempt Right arm unable to thread the catheter in.    RETURN LIVER TRANSPLANT N/A 2023    Procedure: Return liver transplant. Intra-operative ultrasound;  Surgeon: Chad Clifton MD;  Location: [facility] OR    TRANSPLANT KIDNEY RECIPIENT  DONOR N/A 2023    Procedure: Transplant kidney recipient  donor, ureteral stent placement;  Surgeon: Chad Clifton MD;  Location: [facility] OR    TRANSPLANT LIVER RECIPIENT  DONOR N/A 2023    Procedure: Transplant liver recipient  donor;  Surgeon: Chad Clifton MD;  Location: [facility] OR   + knee replacements x 2       Medications  Current Outpatient Medications   Medication Sig Dispense Refill    levothyroxine (SYNTHROID/LEVOTHROID) 88 MCG tablet Take 1 tablet (88 mcg) by mouth daily Takes in the middle of the night daily 90 tablet 3    pantoprazole (PROTONIX) 20 MG EC tablet Take 1 tablet (20 mg) by mouth every morning (before breakfast) 90 tablet 3    Alcohol Swabs PADS Use to swab the area of the injection or quyen as directed Per insurance coverage 100 each 0    allopurinol (ZYLOPRIM) 300 MG tablet Take 1 tablet by mouth daily      anakinra (KINERET) 100 MG/0.67ML SOSY injection Inject 0.67 mLs (100 mg) Subcutaneous daily as needed      apixaban ANTICOAGULANT (ELIQUIS ANTICOAGULANT) 5 MG tablet Take 1 tablet (5 mg) by mouth 2 times daily 180 tablet 3    atorvastatin (LIPITOR) 40 MG tablet Take 1 tablet (40 mg) by mouth every evening 90 tablet 3    capsaicin (ZOSTRIX) 0.025 % external cream Apply to feet three times per day 50 g 3    fluconazole (DIFLUCAN) 200 MG tablet Take 2 tablets (400 mg) by mouth daily for 16 days 32 tablet 0    furosemide (LASIX) 20 MG tablet Take 2 tablets (40 mg) by mouth daily 30 tablet 0    gabapentin (NEURONTIN) 100 MG capsule Take 1 capsule (100 mg) by mouth 3 times daily for 90 days  (Patient taking differently: Take 100 mg by mouth 3 times daily 100 mg in am, 200 mg in the afternoon and 100 mg at bedtime by mouth daily) 270 capsule 3    magnesium oxide (MAG-OX) 400 MG tablet Take 2 tablets (800 mg) by mouth 2 times daily 60 tablet 0    metoprolol tartrate (LOPRESSOR) 50 MG tablet Take 1 tablet (50 mg) by mouth 2 times daily for 90 days 180 tablet 3    multivitamin w/minerals (THERA-VIT-M) tablet Take 1 tablet by mouth daily 30 tablet 0    MYFORTIC (BRAND) 180 MG EC tablet Take 3 tablets (540 mg) by mouth 2 times daily 540 tablet 3    predniSONE (DELTASONE) 5 MG tablet Take 5 mg by mouth daily 135 tablet 3    Sharps Container MISC Use as directed to dispose of needles, lancets and other sharps 1 each 0    simethicone (MYLICON) 125 MG chewable tablet Take 125 mg by mouth 4 times daily as needed for intestinal gas      sulfamethoxazole-trimethoprim (BACTRIM) 400-80 MG tablet Take 1 tablet by mouth daily 90 tablet 3    tacrolimus (GENERIC) 0.5 MG capsule Take 1 capsule (0.5 mg) by mouth every 12 hours Total dose: 0.5 mg  capsule 3    tacrolimus (GENERIC) 1 MG capsule Take 1 capsule (1 mg) by mouth every 12 hours To have available for dose adjustments per transplant team. Discharge dose = 0.5 mg BID. (Patient not taking: Reported on 12/26/2023)      tadalafil (CIALIS) 5 MG tablet Take 1 tablet (5 mg) by mouth daily as needed (take 1-2 tablets at least 30 minutes prior to sexual activity.) 20 tablet 5    triamcinolone (KENALOG) 0.1 % external ointment Apply topically 2 times daily      vancomycin (VANCOCIN) 125 MG capsule Take 1 capsule (125 mg) by mouth 4 times daily for 8 days, THEN 1 capsule (125 mg) 2 times daily for 14 days. 60 capsule 0       Allergies  No Known Allergies    Family hx Social hx   Family History   Problem Relation Age of Onset    Lung Cancer Mother     Colon Cancer Father     Lung Cancer Brother     Heart Failure Brother     Kidney Disease Brother    - Brother: lung  "cancer in 50s  - Brother: heart disease (unclear what), renal disease (unclear what)  - Brother: colon issues (unclear what)   - Alcohol: None since prior to transplant   - Tobacco: Denies  - Drugs: Denies  -  (Apoorva), has two adult children   - Retired, previously in IT. Hasn't worked sine 2019.  - Stays active.  He has been using up recumbent bike every other day and then walking on days where he does not bike.     Review of systems  A 10-point review of systems was negative.    Examination  /80   Pulse 90   Temp 98.2  F (36.8  C) (Oral)   Ht 1.676 m (5' 6\")   Wt 100.7 kg (222 lb)   SpO2 99%   BMI 35.83 kg/m    Body mass index is 35.83 kg/m .    Gen-NAD  Eye- No conjunctival icterus  ENT- MMM  CVS- RRR, no murmurs  RS- CTA bilaterally  Abd-overweight, soft, nontender.  Surgical scar with sutures in place.  Several prior drain sites covered with bandages -upon removal they are clean around the edges without erythema.  Extr- 2+ radial pulses bilaterally, trace lower extremity edema bilaterally  MS- hands without clubbing  Neuro- A+Ox3  Skin- No jaundice. Duptyrens contractures bilaterally.  Gout in right hand.   Psych- normal mood    Laboratory  BMP  Recent Labs   Lab Test 12/28/23  1338 12/20/23  0615 12/19/23  1156 12/19/23  0942 12/19/23  0744 12/19/23  0541 12/18/23  0742 12/18/23  0543   * 132*  --   --   --  133*  --  137   POTASSIUM 5.6* 5.2  --   --   --  4.7  --  4.8   CHLORIDE 96* 102  --   --   --  102  --  106   NAZARIO 9.5 8.6*  --   --   --  8.4*  --  8.5*   CO2 23 21*  --   --   --  22  --  21*   BUN 25.9* 30.4*  --   --   --  32.2*  --  31.0*   CR 1.66* 0.98  --   --   --  0.91  --  1.00   * 119* 168* 163*   < > 150*   < > 131*    < > = values in this interval not displayed.     CBC  Recent Labs   Lab Test 12/28/23  1154 12/20/23  0615 12/19/23  0541 12/18/23  0543   WBC 5.6 11.4* 11.9* 9.6   RBC 3.69* 3.87* 3.91* 3.97*   HGB 10.6* 11.1* 11.2* 11.4*   HCT 33.6* 36.9* 37.3* " 38.1*   MCV 91 95 95 96   MCH 28.7 28.7 28.6 28.7   MCHC 31.5 30.1* 30.0* 29.9*   RDW 16.4* 17.2* 17.2* 17.0*    210 198 174     Liver Enzymes   Recent Labs   Lab Test 12/28/23  1338   PROTTOTAL 6.3*   ALBUMIN 3.1*   BILITOTAL 0.5   ALKPHOS 406*   AST 36   ALT 51      INR   INR   Date Value Ref Range Status   12/11/2023 1.92 (H) 0.85 - 1.15 Final       Iron sat 41%; ferritin 896  MIGUEL A positive  Hep A IgG negative  Hep B s Ag negative  Hep B s Ab < 2.0  Hep B c Ab negative  Hep C ab negative    Hemochromatosis: negative C282Y, homozygous H63D, negative S65C    Radiology  Abdominal US 12/8/2023  1. Examination is partially limited due to overlying bowel gas.  2. The visualized hepatic vasculature is patent with antegrade flow.  3. Normal grayscale appearance of the transplant liver.  4. Mild splenomegaly.  5. Small volume ascites.    CT Chest w/o Contrast 12/2023  IMPRESSION: No CT evidence of metastatic disease to the lungs. Small  left pleural effusion with associated atelectasis increased from June.  Resolution of previous small right pleural effusion from June.  Atherosclerosis with aortic valve leaflet calcifications comment  please correlate for valvular stenosis. Mitral annular calcifications  also present, please correlate for mitral valve abnormality.    CT Abdomen Pelvis 12/2023  1. Small amount of residual free fluid is present within the abdomen  without any free air to suggest viscus perforation.  2. Stable postsurgical changes of liver and right lower quadrant renal  transplants.  3. Postsurgical changes of umbilical hernia repair with small amount  of air and fluid in the umbilicus, presumably postsurgical.  4. Increased mild anasarca and moderate left pleural effusion.  5. Splenomegaly.  6. Unchanged avascular necrosis of the left femoral head without  subchondral collapse.    Endoscopy  EGD 9/2022: Small EV, IGV1 oozing, portal HTN gastropathy, IGV2, no gastric ulcers, normal  duodenum    Colonoscopy 11/2023    Path: Tubular adenoma x3

## 2023-12-26 ENCOUNTER — OFFICE VISIT (OUTPATIENT)
Dept: TRANSPLANT | Facility: CLINIC | Age: 68
End: 2023-12-26
Attending: TRANSPLANT SURGERY
Payer: COMMERCIAL

## 2023-12-26 VITALS
BODY MASS INDEX: 34.46 KG/M2 | OXYGEN SATURATION: 99 % | DIASTOLIC BLOOD PRESSURE: 69 MMHG | HEART RATE: 95 BPM | RESPIRATION RATE: 16 BRPM | SYSTOLIC BLOOD PRESSURE: 100 MMHG | WEIGHT: 220 LBS

## 2023-12-26 DIAGNOSIS — Z94.0 KIDNEY REPLACED BY TRANSPLANT: ICD-10-CM

## 2023-12-26 DIAGNOSIS — Z98.890 S/P HERNIORRHAPHY: ICD-10-CM

## 2023-12-26 DIAGNOSIS — Z87.19 S/P HERNIORRHAPHY: ICD-10-CM

## 2023-12-26 DIAGNOSIS — Z94.4 LIVER REPLACED BY TRANSPLANT (H): ICD-10-CM

## 2023-12-26 PROCEDURE — G0463 HOSPITAL OUTPT CLINIC VISIT: HCPCS | Performed by: TRANSPLANT SURGERY

## 2023-12-26 PROCEDURE — 99213 OFFICE O/P EST LOW 20 MIN: CPT | Mod: 24 | Performed by: TRANSPLANT SURGERY

## 2023-12-26 PROCEDURE — 99213 OFFICE O/P EST LOW 20 MIN: CPT | Performed by: TRANSPLANT SURGERY

## 2023-12-26 RX ORDER — TRAMADOL HYDROCHLORIDE 50 MG/1
50 TABLET ORAL EVERY 6 HOURS PRN
Qty: 10 TABLET | Refills: 0 | Status: SHIPPED | OUTPATIENT
Start: 2023-12-26 | End: 2023-12-28

## 2023-12-26 NOTE — PROGRESS NOTES
Transplant Surgery -OUTPATIENT IMMUNOSUPPRESSION PROGRESS NOTE    Date of Visit: 12/26/2023    Transplants:  6/6/2023 (Liver), 6/6/2023 (Kidney); Postoperative day:  203 (Liver), 203 (Kidney)  ASSESMENT AND PLAN:  1.Graft Function: Liver allograft: no rejection or technical problems.  Kidney allograft: no rejection or technical problems;     2.Immunosuppression Management: keep tacrolimus levels at 8-10 ng/dL  3.Hypertension:ok  4.Renal Function:better  5.Lab frequency: weekly  6.Other:  Wound 1 X 2 cm healing well    Date: December 26, 2023    Transplant:  [x]                             Liver [x]                              Kidney [x]                             Pancreas []                              Other:             Chief Complaint:Follow Up (Kidney and liver txp 6/6/2023)  Doing  well    History of Present Illness:  Patient Active Problem List   Diagnosis    Psoriatic arthritis (H)    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)    HTN, kidney transplant related    Hereditary hemochromatosis (H24)    Other hyperlipidemia    Tophaceous gout    Deep vein thrombosis (DVT) of non-extremity vein, unspecified chronicity    CKD (chronic kidney disease), stage II    Chronic atrial fibrillation (H)    Liver replaced by transplant (H)    Immunosuppressed status (H24)    Kidney replaced by transplant    CAD (coronary artery disease)    Steroid-induced hyperglycemia    Aftercare following organ transplant    Anemia in stage 2 chronic kidney disease    HCC (hepatocellular carcinoma) (H)    Low serum phosphate    Neuropathy    Anemia in other chronic diseases classified elsewhere    Class 2 severe obesity due to excess calories with serious comorbidity in adult (H)    Acute post-operative pain    Status post kidney transplant    S/P hernia repair    N&V (nausea and vomiting)    C. difficile colitis    Intra-abdominal infection    Hypothyroidism    Hypomagnesemia     SOCIAL  /FAMILY HISTORY: [x]                  No recent change    Past Medical History:   Diagnosis Date    Alcoholic cirrhosis of liver with ascites (H) 10/11/2019    ANCA-associated vasculitis (H) 2022    Antiplatelet or antithrombotic long-term use     Avascular necrosis (H)     Left femoral head    C. difficile diarrhea     Coronary artery disease     [facility] 4/2023 - complete occlusion of RCA    ESRD (end stage renal disease) on dialysis (H)     Gout     HCC (hepatocellular carcinoma) (H) 09/05/2023    History of hemochromatosis 10/11/2019    Hypertension     Hypothyroidism 12/19/2023    IgA nephropathy     Obesity     PAF (paroxysmal atrial fibrillation) (H)     Pre-diabetes 2023    Psoriatic arthritis (H)     RA (rheumatoid arthritis) (H)     Status post kidney transplant 06/06/2023    Induction with thymoglobulin 4mg/kg, + DSA CW9    Status post liver transplantation (H) 06/06/2023     Past Surgical History:   Procedure Laterality Date    APPENDECTOMY      Removed at 18 Years Old     BENCH KIDNEY  06/06/2023    Procedure: Bench kidney;  Surgeon: Chad Clifton MD;  Location:  OR    BENCH LIVER  06/06/2023    Procedure: Bench liver;  Surgeon: Chad Clifton MD;  Location:  OR    COLONOSCOPY      2014 at [facility] CORONARY ANGIOGRAM N/A 04/27/2023    Procedure: Coronary Angiogram;  Surgeon: Pablo Araujo MD;  Location:  HEART CARDIAC CATH LAB    EXPLORE GROIN N/A 12/10/2023    Procedure: Umbilical wound exploration, incision and drainage of abcess, exploratory laparotomy, mesh excision, small bowel primary repair;  Surgeon: Chad Clifton MD;  Location:  OR    EYE SURGERY Bilateral     Cataract    H STATISTIC PICC LINE INSERTION >5YR, FAILED Left 06/16/2023    Unable to advance catheter over the axillary area    H STATISTIC PICC LINE INSERTION >5YR, FAILED      HERNIA REPAIR      History of bilateral inguinal hernia repair: 10/28/2014. Open hernia repair: 10/2017.  Abdominal wound exploration and debridement 2017    HERNIORRHAPHY UMBILICAL N/A 2023    Procedure: HERNIORRHAPHY, UMBILICAL, OPEN, WITH MESH;  Surgeon: Chad Clifton MD;  Location: [facility] OR    INSERT SHUNT PORTAL TRANSJUGULAR INTRAHEPTIC  2022    IR CVC NON TUNNEL LINE REMOVAL  2023    IR CVC NON TUNNEL PLACEMENT > 5 YRS  2023    IR CVC TUNNEL PLACEMENT > 5 YRS OF AGE  2023    IR CVC TUNNEL PLACEMENT > 5 YRS OF AGE  2023    IR PICC PLACEMENT > 5 YRS OF AGE  2023    IR RENAL BIOPSY RIGHT  2023    picc failed attempt Right 2023    PICC failed attempt Right arm unable to thread the catheter in.    RETURN LIVER TRANSPLANT N/A 2023    Procedure: Return liver transplant. Intra-operative ultrasound;  Surgeon: Chad Clifton MD;  Location: [facility] OR    TRANSPLANT KIDNEY RECIPIENT  DONOR N/A 2023    Procedure: Transplant kidney recipient  donor, ureteral stent placement;  Surgeon: Chad Clifton MD;  Location: [facility] OR    TRANSPLANT LIVER RECIPIENT  DONOR N/A 2023    Procedure: Transplant liver recipient  donor;  Surgeon: Chad Clifton MD;  Location: [facility] OR     Social History     Socioeconomic History    Marital status:      Spouse name: Not on file    Number of children: Not on file    Years of education: Not on file    Highest education level: Not on file   Occupational History    Not on file   Tobacco Use    Smoking status: Never     Passive exposure: Never    Smokeless tobacco: Never   Vaping Use    Vaping Use: Never used   Substance and Sexual Activity    Alcohol use: Not Currently     Comment: Last ETOH use was 2021    Drug use: Not Currently    Sexual activity: Not on file   Other Topics Concern    Parent/sibling w/ CABG, MI or angioplasty before 65F 55M? Not Asked   Social History Narrative    Not on file     Social Determinants of Health     Financial Resource Strain: Not on  file   Food Insecurity: Not on file   Transportation Needs: Not on file   Physical Activity: Not on file   Stress: Not on file   Social Connections: Not on file   Interpersonal Safety: Not on file   Housing Stability: Not on file     Prescription Medications as of 12/26/2023         Rx Number Disp Refills Start End Last Dispensed Date Next Fill Date Owning Pharmacy    acetaminophen (TYLENOL) 500 MG tablet  100 tablet 0 12/7/2023 1/1/2024   [facility] [place], [place] - [address]    Sig: Take 1 tablet (500 mg) by mouth every 6 hours as needed for mild pain    Class: E-Prescribe    Route: Oral    Alcohol Swabs PADS  100 each 0 7/25/2023 --   [facility] [place], [place] - [address]    Sig: Use to swab the area of the injection or quyen as directed Per insurance coverage    Class: E-Prescribe    allopurinol (ZYLOPRIM) 300 MG tablet  -- -- 11/24/2023 --       Sig: Take 1 tablet by mouth daily    Class: Historical    Route: Oral    anakinra (KINERET) 100 MG/0.67ML SOSY injection  -- -- 11/7/2023 --       Sig: Inject 0.67 mLs (100 mg) Subcutaneous daily as needed    Class: Historical    Route: Subcutaneous    apixaban ANTICOAGULANT (ELIQUIS ANTICOAGULANT) 5 MG tablet  180 tablet 3 8/22/2023 --   [id number] [place] [id number] AT [facility] [address]    Sig: Take 1 tablet (5 mg) by mouth 2 times daily    Class: E-Prescribe    Route: Oral    atorvastatin (LIPITOR) 40 MG tablet  90 tablet 3 11/28/2023 --   [id number] [place] [id number] AT [facility] OF [address]    Sig: Take 1 tablet (40 mg) by mouth every evening    Class: E-Prescribe    Route: Oral    capsaicin (ZOSTRIX) 0.025 % external cream  50 g 3 11/29/2023 --   [id number] [place] [id number] AT [facility] OF [address] & [address]    Sig: Apply to feet three times per day    Class: E-Prescribe    furosemide (LASIX) 20 MG tablet  30 tablet 0  12/20/2023 --   [facility] #[id number] [facility] [address] AT [facility]    Sig: Take 2 tablets (40 mg) by mouth daily    Class: E-Prescribe    Route: Oral    gabapentin (NEURONTIN) 100 MG capsule  270 capsule 3 11/7/2023 2/5/2024   [facility] #[id number] [facility] [address] AT [facility]    Sig: Take 1 capsule (100 mg) by mouth 3 times daily for 90 days    Class: E-Prescribe    Route: Oral    levothyroxine (SYNTHROID/LEVOTHROID) 88 MCG tablet  30 tablet 3 9/5/2023 --   [facility] #[id number] [place] [phone] AT [facility]    Sig: Take 1 tablet (88 mcg) by mouth daily    Class: E-Prescribe    Route: Oral    magnesium oxide (MAG-OX) 400 MG tablet  60 tablet 0 8/16/2023 --   [facility] [place] [address]    Sig: Take 2 tablets (800 mg) by mouth 2 times daily    Class: E-Prescribe    Route: Oral    metoprolol tartrate (LOPRESSOR) 50 MG tablet  180 tablet 3 11/7/2023 2/5/2024   [facility] #[id number] [place] [id number] AT [facility]    Sig: Take 1 tablet (50 mg) by mouth 2 times daily for 90 days    Class: E-Prescribe    Route: Oral    multivitamin w/minerals (THERA-VIT-M) tablet  30 tablet 0 8/16/2023 --   [facility] [place] [address]    Sig: Take 1 tablet by mouth daily    Class: E-Prescribe    Route: Oral    MYFORTIC (BRAND) 180 MG EC tablet  540 tablet 3 9/13/2023 --   [facility] [address]    Sig: Take 3 tablets (540 mg) by mouth 2 times daily    Class: E-Prescribe    Notes to Pharmacy: TXP DT 6/6/2023 (Liver), 6/6/2023 (Kidney) TXP Dischg DT 6/25/2023 DX Kidney replaced by transplant Z94.0 [facility] ([place], [place])    Route: Oral    pantoprazole (PROTONIX) 40 MG EC tablet  90 tablet 3 10/5/2023 --   [id number] - [place], [place] - [address] AT [facility] OF [address]    Sig: Take 1 tablet (40 mg) by mouth every morning (before breakfast)    Class: E-Prescribe    Route: Oral    predniSONE (DELTASONE) 5 MG tablet  135 tablet 3 11/7/2023 --       Sig: Take 5 mg by mouth daily    Class: Historical    Route: Oral    Sharps Container MISC  1 each 0 7/25/2023 --   [facility] [place], [place] - [address]    Sig: Use as directed to dispose of needles, lancets and other sharps    Class: E-Prescribe    simethicone (MYLICON) 125 MG chewable tablet  -- --  --       Sig: Take 125 mg by mouth 4 times daily as needed for intestinal gas    Class: Historical    Route: Oral    sulfamethoxazole-trimethoprim (BACTRIM) 400-80 MG tablet  90 tablet 3 8/17/2023 --   [facility] - [place], [place] - [address]    Sig: Take 1 tablet by mouth daily    Class: E-Prescribe    Route: Oral    tacrolimus (GENERIC) 0.5 MG capsule  180 capsule 3 12/20/2023 --   [facility] - [place], [place] - [address]    Sig: Take 1 capsule (0.5 mg) by mouth every 12 hours Total dose: 0.5 mg BID    Class: E-Prescribe    Notes to Pharmacy: TXP DT 6/6/2023 (Liver), 6/6/2023 (Kidney) TXP Dischg DT 6/25/2023 DX Kidney replaced by transplant Z94.0 TX Center [facility] ([place], [place])    Route: Oral    tadalafil (CIALIS) 5 MG tablet  20 tablet 5 11/28/2023 --   [facility] #[id number] - [place], [place] - [address] AT [facility] OF [address]    Sig: Take 1 tablet (5 mg) by mouth daily as needed (take 1-2 tablets at least 30 minutes prior to sexual activity.)    Class: E-Prescribe    Route: Oral    triamcinolone (KENALOG) 0.1 % external ointment  -- -- 11/8/2023 --       Sig: Apply topically 2 times daily    Class: Historical    Route: Topical    ursodiol (ACTIGALL) 250 MG tablet  180 tablet 3 8/17/2023 --   [facility] -  [place], [place] - [address]     Sig: Take 1 tablet (250 mg) by mouth 2 times daily    Class: E-Prescribe    Route: Oral    vancomycin (VANCOCIN) 125 MG capsule  60 capsule 0 12/20/2023 1/11/2024   [facility] #[id number] - [facility] [address] [facility] AT [facility]    Sig: Take 1 capsule (125 mg) by mouth 4 times daily for 8 days, THEN 1 capsule (125 mg) 2 times daily for 14 days.    Class: E-Prescribe    Route: Oral    fluconazole (DIFLUCAN) 200 MG tablet  32 tablet 0 12/21/2023 1/6/2024   [facility] #[id number] - [place], [place] - [address] [facility]    Sig: Take 2 tablets (400 mg) by mouth daily for 16 days    Class: E-Prescribe    Route: Oral    tacrolimus (GENERIC) 1 MG capsule  -- -- 12/20/2023 --       Sig: Take 1 capsule (1 mg) by mouth every 12 hours To have available for dose adjustments per transplant team. Discharge dose = 0.5 mg BID.    Class: No Print Out    Route: Oral          Patient has no known allergies.   REVIEW OF SYSTEMS (check box if normal)  [x]               GENERAL  [x]                 PULMONARY [x]                GENITOURINARY  [x]                CNS                 [x]                 CARDIAC  [x]                 ENDOCRINE  [x]                EARS,NOSE,THROAT [x]                 GASTROINTESTINAL [x]                 NEUROLOGIC    [x]                MUSCLOSKELTAL  [x]                  HEMATOLOGY      PHYSICAL EXAM (check box if normal)/69 (BP Location: Right arm, Patient Position: Sitting, Cuff Size: Adult Large)   Pulse 95   Resp 16   Wt 99.8 kg (220 lb)   SpO2 99%   BMI 34.46 kg/m          [x]            GENERAL:    [x]       EYES:  ICTERIC   []        YES  []                    NO  [x]           EXTREMITIES: Clubbing []       Y     [x]           N    [x]           EARS, NOSE, THROAT: Membranes Moist    YES   [x]                   NO []                  [x]           LUNGS:  CLEAR    YES       [x]                  NO    []                                 [x]           SKIN: Jaundice           YES       []                  NO    [x]                   Rash: YES       []                  NO    [x]                                     [x]             HEART: Regular Rate          YES       [x]                  NO    []                   Incision Clean:  YES       [x]                  NO    []                                [x]                    ABDOMEN: Organomegaly YES       []                  NO    [x]                       [x]                    NEUROLOGICAL:  Nonfocal  YES       [x]                  NO    []                       [x]                    Hernia YES       []                  NO    [x]                   PSYCHIATRIC:  Appropriate  YES       [x]                  NO    []                       OTHER:                                                                                                   PAIN SCALE:: 3

## 2023-12-26 NOTE — LETTER
12/26/2023         RE: Damion Quinones   [address] [facility] [address]        Dear Colleague,    Thank you for referring your patient, Damion Quinones, to the [facility]. Please see a copy of my visit note below.    Transplant Surgery -OUTPATIENT IMMUNOSUPPRESSION PROGRESS NOTE    Date of Visit: 12/26/2023    Transplants:  6/6/2023 (Liver), 6/6/2023 (Kidney); Postoperative day:  203 (Liver), 203 (Kidney)  ASSESMENT AND PLAN:  1.Graft Function: Liver allograft: no rejection or technical problems.  Kidney allograft: no rejection or technical problems;     2.Immunosuppression Management: keep tacrolimus levels at 8-10 ng/dL  3.Hypertension:ok  4.Renal Function:better  5.Lab frequency: weekly  6.Other:  Wound 1 X 2 cm healing well    Date: December 26, 2023    Transplant:  [x]                             Liver [x]                              Kidney [x]                             Pancreas []                              Other:             Chief Complaint:Follow Up (Kidney and liver txp 6/6/2023)  Doing  well    History of Present Illness:  Patient Active Problem List   Diagnosis     Psoriatic arthritis (H)     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)     HTN, kidney transplant related     Hereditary hemochromatosis (H24)     Other hyperlipidemia     Tophaceous gout     Deep vein thrombosis (DVT) of non-extremity vein, unspecified chronicity     CKD (chronic kidney disease), stage II     Chronic atrial fibrillation (H)     Liver replaced by transplant (H)     Immunosuppressed status (H24)     Kidney replaced by transplant     CAD (coronary artery disease)     Steroid-induced hyperglycemia     Aftercare following organ transplant     Anemia in stage 2 chronic kidney disease     HCC (hepatocellular carcinoma) (H)     Low serum phosphate     Neuropathy     Anemia in other chronic diseases classified elsewhere     Class 2 severe obesity  due to excess calories with serious comorbidity in adult (H)     Acute post-operative pain     Status post kidney transplant     S/P hernia repair     N&V (nausea and vomiting)     C. difficile colitis     Intra-abdominal infection     Hypothyroidism     Hypomagnesemia     SOCIAL /FAMILY HISTORY: [x]                  No recent change    Past Medical History:   Diagnosis Date     Alcoholic cirrhosis of liver with ascites (H) 10/11/2019     ANCA-associated vasculitis (H) 2022     Antiplatelet or antithrombotic long-term use      Avascular necrosis (H)     Left femoral head     C. difficile diarrhea      Coronary artery disease     [facility] 4/2023 - complete occlusion of RCA     ESRD (end stage renal disease) on dialysis (H)      Gout      HCC (hepatocellular carcinoma) (H) 09/05/2023     History of hemochromatosis 10/11/2019     Hypertension      Hypothyroidism 12/19/2023     IgA nephropathy      Obesity      PAF (paroxysmal atrial fibrillation) (H)      Pre-diabetes 2023     Psoriatic arthritis (H)      RA (rheumatoid arthritis) (H)      Status post kidney transplant 06/06/2023    Induction with thymoglobulin 4mg/kg, + DSA CW9     Status post liver transplantation (H) 06/06/2023     Past Surgical History:   Procedure Laterality Date     APPENDECTOMY      Removed at 18 Years Old      BENCH KIDNEY  06/06/2023    Procedure: Bench kidney;  Surgeon: Chad Clifton MD;  Location:  OR     BENCH LIVER  06/06/2023    Procedure: Bench liver;  Surgeon: Chad Clifton MD;  Location:  OR     COLONOSCOPY      2014 at [facility]      CV CORONARY ANGIOGRAM N/A 04/27/2023    Procedure: Coronary Angiogram;  Surgeon: Pablo Araujo MD;  Location:  HEART CARDIAC CATH LAB     EXPLORE GROIN N/A 12/10/2023    Procedure: Umbilical wound exploration, incision and drainage of abcess, exploratory laparotomy, mesh excision, small bowel primary repair;  Surgeon: Chad Clifton MD;  Location:  OR      EYE SURGERY Bilateral     Cataract     H STATISTIC PICC LINE INSERTION >5YR, FAILED Left 2023    Unable to advance catheter over the axillary area     H STATISTIC PICC LINE INSERTION >5YR, FAILED       HERNIA REPAIR      History of bilateral inguinal hernia repair: 10/28/2014. Open hernia repair: 10/2017. Abdominal wound exploration and debridement 2017     HERNIORRHAPHY UMBILICAL N/A 2023    Procedure: HERNIORRHAPHY, UMBILICAL, OPEN, WITH MESH;  Surgeon: Chad Clifton MD;  Location: [facility] OR     INSERT SHUNT PORTAL TRANSJUGULAR INTRAHEPTIC  2022     IR CVC NON TUNNEL LINE REMOVAL  2023     IR CVC NON TUNNEL PLACEMENT > 5 YRS  2023     IR CVC TUNNEL PLACEMENT > 5 YRS OF AGE  2023     IR CVC TUNNEL PLACEMENT > 5 YRS OF AGE  2023     IR PICC PLACEMENT > 5 YRS OF AGE  2023     IR RENAL BIOPSY RIGHT  2023     picc failed attempt Right 2023    PICC failed attempt Right arm unable to thread the catheter in.     RETURN LIVER TRANSPLANT N/A 2023    Procedure: Return liver transplant. Intra-operative ultrasound;  Surgeon: Chad Clifton MD;  Location: [facility] OR     TRANSPLANT KIDNEY RECIPIENT  DONOR N/A 2023    Procedure: Transplant kidney recipient  donor, ureteral stent placement;  Surgeon: Chad Clfiton MD;  Location: [facility] OR     TRANSPLANT LIVER RECIPIENT  DONOR N/A 2023    Procedure: Transplant liver recipient  donor;  Surgeon: Chad Clifton MD;  Location: [facility] OR     Social History     Socioeconomic History     Marital status:      Spouse name: Not on file     Number of children: Not on file     Years of education: Not on file     Highest education level: Not on file   Occupational History     Not on file   Tobacco Use     Smoking status: Never     Passive exposure: Never     Smokeless tobacco: Never   Vaping Use     Vaping Use: Never used   Substance and Sexual Activity      Alcohol use: Not Currently     Comment: Last ETOH use was 12/31/2021     Drug use: Not Currently     Sexual activity: Not on file   Other Topics Concern     Parent/sibling w/ CABG, MI or angioplasty before 65F 55M? Not Asked   Social History Narrative     Not on file     Social Determinants of Health     Financial Resource Strain: Not on file   Food Insecurity: Not on file   Transportation Needs: Not on file   Physical Activity: Not on file   Stress: Not on file   Social Connections: Not on file   Interpersonal Safety: Not on file   Housing Stability: Not on file     Prescription Medications as of 12/26/2023         Rx Number Disp Refills Start End Last Dispensed Date Next Fill Date Owning Pharmacy    acetaminophen (TYLENOL) 500 MG tablet  100 tablet 0 12/7/2023 1/1/2024   [facility] [place], [place] - [address]    Sig: Take 1 tablet (500 mg) by mouth every 6 hours as needed for mild pain    Class: E-Prescribe    Route: Oral    Alcohol Swabs PADS  100 each 0 7/25/2023 --   [facility] [place], [place] - [address]    Sig: Use to swab the area of the injection or quyen as directed Per insurance coverage    Class: E-Prescribe    allopurinol (ZYLOPRIM) 300 MG tablet  -- -- 11/24/2023 --       Sig: Take 1 tablet by mouth daily    Class: Historical    Route: Oral    anakinra (KINERET) 100 MG/0.67ML SOSY injection  -- -- 11/7/2023 --       Sig: Inject 0.67 mLs (100 mg) Subcutaneous daily as needed    Class: Historical    Route: Subcutaneous    apixaban ANTICOAGULANT (ELIQUIS ANTICOAGULANT) 5 MG tablet  180 tablet 3 8/22/2023 --   [facility][id number] - [facility] [address] AT [facility] [address]    Sig: Take 1 tablet (5 mg) by mouth 2 times daily    Class: E-Prescribe    Route: Oral    atorvastatin (LIPITOR) 40 MG tablet  90 tablet 3 11/28/2023 --   [facility] #[id number] - [facility] [address] AT [facility] [address]    Sig: Take 1  tablet (40 mg) by mouth every evening    Class: E-Prescribe    Route: Oral    capsaicin (ZOSTRIX) 0.025 % external cream  50 g 3 11/29/2023 --   [facility] [address] [place], [place] [address] [address]  AT [facility]    Sig: Apply to feet three times per day    Class: E-Prescribe    furosemide (LASIX) 20 MG tablet  30 tablet 0 12/20/2023 --   [facility] [address] [place], [place] [address] [address]  AT [facility]    Sig: Take 2 tablets (40 mg) by mouth daily    Class: E-Prescribe    Route: Oral    gabapentin (NEURONTIN) 100 MG capsule  270 capsule 3 11/7/2023 2/5/2024   [facility] [address] [place], [place] [address] [address]  AT [facility]    Sig: Take 1 capsule (100 mg) by mouth 3 times daily for 90 days    Class: E-Prescribe    Route: Oral    levothyroxine (SYNTHROID/LEVOTHROID) 88 MCG tablet  30 tablet 3 9/5/2023 --   [facility] #[id number] [place] [id number]  AT [facility]    Sig: Take 1 tablet (88 mcg) by mouth daily    Class: E-Prescribe    Route: Oral    magnesium oxide (MAG-OX) 400 MG tablet  60 tablet 0 8/16/2023 --   [facility] [place] [address] [address]    Sig: Take 2 tablets (800 mg) by mouth 2 times daily    Class: E-Prescribe    Route: Oral    metoprolol tartrate (LOPRESSOR) 50 MG tablet  180 tablet 3 11/7/2023 2/5/2024   [facility] [address] [place], [place] [address] [address]  AT [facility]    Sig: Take 1 tablet (50 mg) by mouth 2 times daily for 90 days    Class: E-Prescribe    Route: Oral    multivitamin w/minerals (THERA-VIT-M) tablet  30 tablet 0 8/16/2023 --   [facility] [place], [place] - [facility] [address]    Sig: Take 1 tablet by mouth daily    Class: E-Prescribe    Route: Oral    MYFORTIC (BRAND) 180 MG EC tablet  540 tablet 3 9/13/2023 --   [facility] - [place], [place] - [address]    Sig: Take 3 tablets (540 mg) by mouth 2  times daily    Class: E-Prescribe    Notes to Pharmacy: TXP DT 6/6/2023 (Liver), 6/6/2023 (Kidney) TXP Dischg DT 6/25/2023 DX Kidney replaced by transplant Z94.0 TX [facility] ([place], [place])    Route: Oral    pantoprazole (PROTONIX) 40 MG EC tablet  90 tablet 3 10/5/2023 --   [facility] #[id number] [facility] [place] - [address] AT [facility] [address]    Sig: Take 1 tablet (40 mg) by mouth every morning (before breakfast)    Class: E-Prescribe    Route: Oral    predniSONE (DELTASONE) 5 MG tablet  135 tablet 3 11/7/2023 --       Sig: Take 5 mg by mouth daily    Class: Historical    Route: Oral    Sharps Container MISC  1 each 0 7/25/2023 --   [facility] [place], [place] - [address]    Sig: Use as directed to dispose of needles, lancets and other sharps    Class: E-Prescribe    simethicone (MYLICON) 125 MG chewable tablet  -- --  --       Sig: Take 125 mg by mouth 4 times daily as needed for intestinal gas    Class: Historical    Route: Oral    sulfamethoxazole-trimethoprim (BACTRIM) 400-80 MG tablet  90 tablet 3 8/17/2023 --   [facility] - [place] [address] [address]    Sig: Take 1 tablet by mouth daily    Class: E-Prescribe    Route: Oral    tacrolimus (GENERIC) 0.5 MG capsule  180 capsule 3 12/20/2023 --   [facility] - [place] [address] [address]    Sig: Take 1 capsule (0.5 mg) by mouth every 12 hours Total dose: 0.5 mg BID    Class: E-Prescribe    Notes to Pharmacy: TXP DT 6/6/2023 (Liver), 6/6/2023 (Kidney) TXP Dischg DT 6/25/2023 DX Kidney replaced by transplant Z94.0 TX [facility] ([place], [place])    Route: Oral    tadalafil (CIALIS) 5 MG tablet  20 tablet 5 11/28/2023 --   [facility] #[id number] [facility] [place], [place] - [address] AT [facility] [address]    Sig: Take 1 tablet (5 mg) by mouth daily as needed (take 1-2 tablets  at least 30 minutes prior to sexual activity.)    Class: E-Prescribe    Route: Oral    triamcinolone (KENALOG) 0.1 % external ointment  -- -- 11/8/2023 --       Sig: Apply topically 2 times daily    Class: Historical    Route: Topical    ursodiol (ACTIGALL) 250 MG tablet  180 tablet 3 8/17/2023 --   [facility] - [place], [place] - [address]     Sig: Take 1 tablet (250 mg) by mouth 2 times daily    Class: E-Prescribe    Route: Oral    vancomycin (VANCOCIN) 125 MG capsule  60 capsule 0 12/20/2023 1/11/2024   [id number] [address]  AT [facility]    Sig: Take 1 capsule (125 mg) by mouth 4 times daily for 8 days, THEN 1 capsule (125 mg) 2 times daily for 14 days.    Class: E-Prescribe    Route: Oral    fluconazole (DIFLUCAN) 200 MG tablet  32 tablet 0 12/21/2023 1/6/2024   [id number] [address]  AT [facility]    Sig: Take 2 tablets (400 mg) by mouth daily for 16 days    Class: E-Prescribe    Route: Oral    tacrolimus (GENERIC) 1 MG capsule  -- -- 12/20/2023 --       Sig: Take 1 capsule (1 mg) by mouth every 12 hours To have available for dose adjustments per transplant team. Discharge dose = 0.5 mg BID.    Class: No Print Out    Route: Oral          Patient has no known allergies.   REVIEW OF SYSTEMS (check box if normal)  [x]               GENERAL  [x]                 PULMONARY [x]                GENITOURINARY  [x]                CNS                 [x]                 CARDIAC  [x]                 ENDOCRINE  [x]                EARS,NOSE,THROAT [x]                 GASTROINTESTINAL [x]                 NEUROLOGIC    [x]                MUSCLOSKELTAL  [x]                  HEMATOLOGY      PHYSICAL EXAM (check box if normal)/69 (BP Location: Right arm, Patient Position: Sitting, Cuff Size: Adult Large)   Pulse 95   Resp 16   Wt 99.8 kg (220 lb)   SpO2 99%   BMI 34.46 kg/m          [x]             GENERAL:    [x]       EYES:  ICTERIC   []        YES  []                    NO  [x]           EXTREMITIES: Clubbing []       Y     [x]           N    [x]           EARS, NOSE, THROAT: Membranes Moist    YES   [x]                   NO []                  [x]           LUNGS:  CLEAR    YES       [x]                  NO    []                                [x]           SKIN: Jaundice           YES       []                  NO    [x]                   Rash: YES       []                  NO    [x]                                     [x]             HEART: Regular Rate          YES       [x]                  NO    []                   Incision Clean:  YES       [x]                  NO    []                                [x]                    ABDOMEN: Organomegaly YES       []                  NO    [x]                       [x]                    NEUROLOGICAL:  Nonfocal  YES       [x]                  NO    []                       [x]                    Hernia YES       []                  NO    [x]                   PSYCHIATRIC:  Appropriate  YES       [x]                  NO    []                       OTHER:                                                                                                   PAIN SCALE:: 3       Again, thank you for allowing me to participate in the care of your patient.        Sincerely,        Chad Clifton MD

## 2023-12-26 NOTE — NURSING NOTE
Chief Complaint   Patient presents with    Follow Up     Kidney and liver txp 6/6/2023       /69 (BP Location: Right arm, Patient Position: Sitting, Cuff Size: Adult Large)   Pulse 95   Resp 16   Wt 99.8 kg (220 lb)   SpO2 99%   BMI 34.46 kg/m      BIANCA DAVIS, RN on 12/26/2023 at 9:49 AM

## 2023-12-28 ENCOUNTER — OFFICE VISIT (OUTPATIENT)
Dept: TRANSPLANT | Facility: CLINIC | Age: 68
End: 2023-12-28
Attending: INTERNAL MEDICINE
Payer: COMMERCIAL

## 2023-12-28 ENCOUNTER — VIRTUAL VISIT (OUTPATIENT)
Dept: PHARMACY | Facility: CLINIC | Age: 68
End: 2023-12-28
Payer: COMMERCIAL

## 2023-12-28 ENCOUNTER — ANCILLARY PROCEDURE (OUTPATIENT)
Dept: CT IMAGING | Facility: CLINIC | Age: 68
End: 2023-12-28
Attending: INTERNAL MEDICINE
Payer: COMMERCIAL

## 2023-12-28 ENCOUNTER — LAB (OUTPATIENT)
Dept: LAB | Facility: CLINIC | Age: 68
End: 2023-12-28
Payer: COMMERCIAL

## 2023-12-28 ENCOUNTER — OFFICE VISIT (OUTPATIENT)
Dept: GASTROENTEROLOGY | Facility: CLINIC | Age: 68
End: 2023-12-28
Attending: INTERNAL MEDICINE
Payer: COMMERCIAL

## 2023-12-28 ENCOUNTER — LAB (OUTPATIENT)
Dept: LAB | Facility: CLINIC | Age: 68
End: 2023-12-28
Attending: INTERNAL MEDICINE
Payer: COMMERCIAL

## 2023-12-28 VITALS
TEMPERATURE: 98.2 F | SYSTOLIC BLOOD PRESSURE: 133 MMHG | BODY MASS INDEX: 35.68 KG/M2 | OXYGEN SATURATION: 99 % | DIASTOLIC BLOOD PRESSURE: 80 MMHG | WEIGHT: 222 LBS | HEIGHT: 66 IN | HEART RATE: 90 BPM

## 2023-12-28 VITALS
SYSTOLIC BLOOD PRESSURE: 133 MMHG | WEIGHT: 220 LBS | HEART RATE: 90 BPM | OXYGEN SATURATION: 99 % | BODY MASS INDEX: 35.51 KG/M2 | DIASTOLIC BLOOD PRESSURE: 80 MMHG | TEMPERATURE: 98.2 F

## 2023-12-28 DIAGNOSIS — D63.1 ANEMIA IN STAGE 2 CHRONIC KIDNEY DISEASE: ICD-10-CM

## 2023-12-28 DIAGNOSIS — C22.0 HCC (HEPATOCELLULAR CARCINOMA) (H): ICD-10-CM

## 2023-12-28 DIAGNOSIS — Z94.4 HISTORY OF LIVER TRANSPLANT (H): ICD-10-CM

## 2023-12-28 DIAGNOSIS — L40.50 PSORIATIC ARTHRITIS (H): ICD-10-CM

## 2023-12-28 DIAGNOSIS — Z79.899 ENCOUNTER FOR LONG-TERM CURRENT USE OF MEDICATION: ICD-10-CM

## 2023-12-28 DIAGNOSIS — Z94.0 KIDNEY REPLACED BY TRANSPLANT: ICD-10-CM

## 2023-12-28 DIAGNOSIS — I48.0 PAF (PAROXYSMAL ATRIAL FIBRILLATION) (H): ICD-10-CM

## 2023-12-28 DIAGNOSIS — Z94.4 LIVER TRANSPLANT RECIPIENT (H): ICD-10-CM

## 2023-12-28 DIAGNOSIS — N18.2 ANEMIA IN STAGE 2 CHRONIC KIDNEY DISEASE: ICD-10-CM

## 2023-12-28 DIAGNOSIS — E03.9 HYPOTHYROIDISM, UNSPECIFIED TYPE: ICD-10-CM

## 2023-12-28 DIAGNOSIS — M1A.9XX1 TOPHACEOUS GOUT: ICD-10-CM

## 2023-12-28 DIAGNOSIS — D84.9 IMMUNOSUPPRESSION (H): ICD-10-CM

## 2023-12-28 DIAGNOSIS — Z94.4 LIVER REPLACED BY TRANSPLANT (H): ICD-10-CM

## 2023-12-28 DIAGNOSIS — Z23 NEED FOR COVID-19 VACCINE: Primary | ICD-10-CM

## 2023-12-28 DIAGNOSIS — D84.9 IMMUNOSUPPRESSED STATUS (H): ICD-10-CM

## 2023-12-28 DIAGNOSIS — N17.9 AKI (ACUTE KIDNEY INJURY) (H): ICD-10-CM

## 2023-12-28 DIAGNOSIS — Z78.9 TAKES DIETARY SUPPLEMENTS: ICD-10-CM

## 2023-12-28 DIAGNOSIS — Z94.4 LIVER TRANSPLANTED (H): ICD-10-CM

## 2023-12-28 DIAGNOSIS — A04.72 C. DIFFICILE COLITIS: ICD-10-CM

## 2023-12-28 DIAGNOSIS — Z48.298 AFTERCARE FOLLOWING ORGAN TRANSPLANT: ICD-10-CM

## 2023-12-28 DIAGNOSIS — Z94.0 HISTORY OF KIDNEY TRANSPLANT: ICD-10-CM

## 2023-12-28 DIAGNOSIS — Z29.89 NEED FOR PNEUMOCYSTIS PROPHYLAXIS: ICD-10-CM

## 2023-12-28 DIAGNOSIS — Z94.0 HTN, KIDNEY TRANSPLANT RELATED: ICD-10-CM

## 2023-12-28 DIAGNOSIS — E61.1 IRON DEFICIENCY: ICD-10-CM

## 2023-12-28 DIAGNOSIS — R94.5 ABNORMAL RESULTS OF LIVER FUNCTION STUDIES: Primary | ICD-10-CM

## 2023-12-28 DIAGNOSIS — N18.2 CKD (CHRONIC KIDNEY DISEASE) STAGE 2, GFR 60-89 ML/MIN: ICD-10-CM

## 2023-12-28 DIAGNOSIS — I77.82 ANCA-ASSOCIATED VASCULITIS (H): ICD-10-CM

## 2023-12-28 DIAGNOSIS — M87.00 AVASCULAR NECROSIS OF BONE (H): Primary | ICD-10-CM

## 2023-12-28 DIAGNOSIS — B25.9 CYTOMEGALOVIRUS (CMV) VIREMIA (H): ICD-10-CM

## 2023-12-28 DIAGNOSIS — K21.9 GASTROESOPHAGEAL REFLUX DISEASE, UNSPECIFIED WHETHER ESOPHAGITIS PRESENT: ICD-10-CM

## 2023-12-28 DIAGNOSIS — E78.49 OTHER HYPERLIPIDEMIA: ICD-10-CM

## 2023-12-28 DIAGNOSIS — R94.5 ABNORMAL RESULTS OF LIVER FUNCTION STUDIES: ICD-10-CM

## 2023-12-28 DIAGNOSIS — I15.1 HTN, KIDNEY TRANSPLANT RELATED: ICD-10-CM

## 2023-12-28 DIAGNOSIS — E83.42 HYPOMAGNESEMIA: ICD-10-CM

## 2023-12-28 DIAGNOSIS — Z94.0 KIDNEY REPLACED BY TRANSPLANT: Primary | ICD-10-CM

## 2023-12-28 DIAGNOSIS — K70.30 ALCOHOLIC CIRRHOSIS (H): ICD-10-CM

## 2023-12-28 PROBLEM — N18.9 ANEMIA IN CHRONIC RENAL DISEASE: Status: ACTIVE | Noted: 2023-11-22

## 2023-12-28 PROBLEM — R11.2 N&V (NAUSEA AND VOMITING): Status: RESOLVED | Noted: 2023-12-08 | Resolved: 2023-12-28

## 2023-12-28 PROBLEM — G89.18 ACUTE POST-OPERATIVE PAIN: Status: RESOLVED | Noted: 2023-12-08 | Resolved: 2023-12-28

## 2023-12-28 PROBLEM — R79.89: Status: RESOLVED | Noted: 2023-09-25 | Resolved: 2023-12-28

## 2023-12-28 PROBLEM — B99.9 INTRA-ABDOMINAL INFECTION: Status: RESOLVED | Noted: 2023-12-19 | Resolved: 2023-12-28

## 2023-12-28 LAB
ALBUMIN MFR UR ELPH: <6 MG/DL
ALBUMIN SERPL BCG-MCNC: 3.1 G/DL (ref 3.5–5.2)
ALBUMIN UR-MCNC: NEGATIVE MG/DL
ALP SERPL-CCNC: 406 U/L (ref 40–150)
ALT SERPL W P-5'-P-CCNC: 51 U/L (ref 0–70)
ANION GAP SERPL CALCULATED.3IONS-SCNC: 12 MMOL/L (ref 7–15)
APPEARANCE UR: CLEAR
AST SERPL W P-5'-P-CCNC: 36 U/L (ref 0–45)
BILIRUB DIRECT SERPL-MCNC: 0.31 MG/DL (ref 0–0.3)
BILIRUB SERPL-MCNC: 0.5 MG/DL
BILIRUB UR QL STRIP: NEGATIVE
BUN SERPL-MCNC: 25.9 MG/DL (ref 8–23)
CALCIUM SERPL-MCNC: 9.5 MG/DL (ref 8.8–10.2)
CHLORIDE SERPL-SCNC: 96 MMOL/L (ref 98–107)
CHOLEST SERPL-MCNC: 159 MG/DL
CHOLEST SERPL-MCNC: NORMAL MG/DL
COLOR UR AUTO: NORMAL
CREAT SERPL-MCNC: 1.66 MG/DL (ref 0.67–1.17)
CREAT UR-MCNC: 20.9 MG/DL
DEPRECATED HCO3 PLAS-SCNC: 23 MMOL/L (ref 22–29)
EGFRCR SERPLBLD CKD-EPI 2021: 45 ML/MIN/1.73M2
ERYTHROCYTE [DISTWIDTH] IN BLOOD BY AUTOMATED COUNT: 16.4 % (ref 10–15)
FASTING STATUS PATIENT QL REPORTED: NO
FASTING STATUS PATIENT QL REPORTED: YES
FERRITIN SERPL-MCNC: 1832 NG/ML (ref 31–409)
GLUCOSE SERPL-MCNC: 120 MG/DL (ref 70–99)
GLUCOSE UR STRIP-MCNC: NEGATIVE MG/DL
HBA1C MFR BLD: 6.4 %
HCT VFR BLD AUTO: 33.6 % (ref 40–53)
HDLC SERPL-MCNC: 29 MG/DL
HDLC SERPL-MCNC: NORMAL MG/DL
HGB BLD-MCNC: 10.6 G/DL (ref 13.3–17.7)
HGB UR QL STRIP: NEGATIVE
IRON BINDING CAPACITY (ROCHE): 177 UG/DL (ref 240–430)
IRON SATN MFR SERPL: 19 % (ref 15–46)
IRON SERPL-MCNC: 33 UG/DL (ref 61–157)
KETONES UR STRIP-MCNC: NEGATIVE MG/DL
LDLC SERPL CALC-MCNC: 83 MG/DL
LDLC SERPL CALC-MCNC: NORMAL MG/DL
LEUKOCYTE ESTERASE UR QL STRIP: NEGATIVE
MAGNESIUM SERPL-MCNC: 1.9 MG/DL (ref 1.7–2.3)
MAGNESIUM SERPL-MCNC: 2 MG/DL (ref 1.7–2.3)
MCH RBC QN AUTO: 28.7 PG (ref 26.5–33)
MCHC RBC AUTO-ENTMCNC: 31.5 G/DL (ref 31.5–36.5)
MCV RBC AUTO: 91 FL (ref 78–100)
NITRATE UR QL: NEGATIVE
NONHDLC SERPL-MCNC: 130 MG/DL
NONHDLC SERPL-MCNC: NORMAL MG/DL
PH UR STRIP: 6.5 [PH] (ref 5–7)
PHOSPHATE SERPL-MCNC: 3.9 MG/DL (ref 2.5–4.5)
PLATELET # BLD AUTO: 334 10E3/UL (ref 150–450)
POTASSIUM SERPL-SCNC: 5.6 MMOL/L (ref 3.4–5.3)
PROT SERPL-MCNC: 6.3 G/DL (ref 6.4–8.3)
PROT/CREAT 24H UR: NORMAL MG/G{CREAT}
RBC # BLD AUTO: 3.69 10E6/UL (ref 4.4–5.9)
SODIUM SERPL-SCNC: 131 MMOL/L (ref 135–145)
SP GR UR STRIP: 1.02 (ref 1–1.03)
TACROLIMUS BLD-MCNC: 12.8 UG/L (ref 5–15)
TME LAST DOSE: NORMAL H
TME LAST DOSE: NORMAL H
TRIGL SERPL-MCNC: 234 MG/DL
TRIGL SERPL-MCNC: NORMAL MG/DL
TSH SERPL DL<=0.005 MIU/L-ACNC: 2.93 UIU/ML (ref 0.3–4.2)
URATE SERPL-MCNC: 5.7 MG/DL (ref 3.4–7)
UROBILINOGEN UR STRIP-MCNC: NORMAL MG/DL
WBC # BLD AUTO: 5.6 10E3/UL (ref 4–11)

## 2023-12-28 PROCEDURE — 83540 ASSAY OF IRON: CPT | Performed by: PATHOLOGY

## 2023-12-28 PROCEDURE — 99215 OFFICE O/P EST HI 40 MIN: CPT | Performed by: INTERNAL MEDICINE

## 2023-12-28 PROCEDURE — 80053 COMPREHEN METABOLIC PANEL: CPT | Performed by: PATHOLOGY

## 2023-12-28 PROCEDURE — 36415 COLL VENOUS BLD VENIPUNCTURE: CPT

## 2023-12-28 PROCEDURE — G0463 HOSPITAL OUTPT CLINIC VISIT: HCPCS | Mod: 27 | Performed by: INTERNAL MEDICINE

## 2023-12-28 PROCEDURE — 87799 DETECT AGENT NOS DNA QUANT: CPT

## 2023-12-28 PROCEDURE — 83735 ASSAY OF MAGNESIUM: CPT

## 2023-12-28 PROCEDURE — 99213 OFFICE O/P EST LOW 20 MIN: CPT | Mod: 25,27 | Performed by: INTERNAL MEDICINE

## 2023-12-28 PROCEDURE — 82248 BILIRUBIN DIRECT: CPT | Performed by: PATHOLOGY

## 2023-12-28 PROCEDURE — 99207 PR NO CHARGE LOS: CPT | Performed by: PHARMACIST

## 2023-12-28 PROCEDURE — 91320 SARSCV2 VAC 30MCG TRS-SUC IM: CPT | Performed by: INTERNAL MEDICINE

## 2023-12-28 PROCEDURE — 71250 CT THORAX DX C-: CPT | Performed by: RADIOLOGY

## 2023-12-28 PROCEDURE — 85027 COMPLETE CBC AUTOMATED: CPT

## 2023-12-28 PROCEDURE — 99213 OFFICE O/P EST LOW 20 MIN: CPT | Mod: 25 | Performed by: INTERNAL MEDICINE

## 2023-12-28 PROCEDURE — 80180 DRUG SCRN QUAN MYCOPHENOLATE: CPT

## 2023-12-28 PROCEDURE — 99000 SPECIMEN HANDLING OFFICE-LAB: CPT | Performed by: PATHOLOGY

## 2023-12-28 PROCEDURE — 80061 LIPID PANEL: CPT | Performed by: PATHOLOGY

## 2023-12-28 PROCEDURE — 90480 ADMN SARSCOV2 VAC 1/ONLY CMP: CPT | Performed by: INTERNAL MEDICINE

## 2023-12-28 PROCEDURE — 84156 ASSAY OF PROTEIN URINE: CPT

## 2023-12-28 PROCEDURE — 80061 LIPID PANEL: CPT

## 2023-12-28 PROCEDURE — 84100 ASSAY OF PHOSPHORUS: CPT | Performed by: PATHOLOGY

## 2023-12-28 PROCEDURE — G0463 HOSPITAL OUTPT CLINIC VISIT: HCPCS | Mod: 25 | Performed by: INTERNAL MEDICINE

## 2023-12-28 PROCEDURE — 84550 ASSAY OF BLOOD/URIC ACID: CPT

## 2023-12-28 PROCEDURE — 80197 ASSAY OF TACROLIMUS: CPT

## 2023-12-28 PROCEDURE — 83550 IRON BINDING TEST: CPT | Performed by: PATHOLOGY

## 2023-12-28 PROCEDURE — 83036 HEMOGLOBIN GLYCOSYLATED A1C: CPT

## 2023-12-28 PROCEDURE — 36415 COLL VENOUS BLD VENIPUNCTURE: CPT | Performed by: PATHOLOGY

## 2023-12-28 PROCEDURE — 84443 ASSAY THYROID STIM HORMONE: CPT | Performed by: PATHOLOGY

## 2023-12-28 PROCEDURE — 250N000011 HC RX IP 250 OP 636: Performed by: INTERNAL MEDICINE

## 2023-12-28 PROCEDURE — 83516 IMMUNOASSAY NONANTIBODY: CPT

## 2023-12-28 PROCEDURE — 82728 ASSAY OF FERRITIN: CPT | Performed by: PATHOLOGY

## 2023-12-28 PROCEDURE — 84080 ASSAY ALKALINE PHOSPHATASES: CPT | Mod: 90 | Performed by: PATHOLOGY

## 2023-12-28 PROCEDURE — 99215 OFFICE O/P EST HI 40 MIN: CPT | Mod: 24 | Performed by: INTERNAL MEDICINE

## 2023-12-28 PROCEDURE — 83735 ASSAY OF MAGNESIUM: CPT | Performed by: PATHOLOGY

## 2023-12-28 PROCEDURE — 81003 URINALYSIS AUTO W/O SCOPE: CPT

## 2023-12-28 PROCEDURE — 74177 CT ABD & PELVIS W/CONTRAST: CPT | Mod: GC | Performed by: RADIOLOGY

## 2023-12-28 RX ORDER — IOPAMIDOL 755 MG/ML
97 INJECTION, SOLUTION INTRAVASCULAR ONCE
Status: COMPLETED | OUTPATIENT
Start: 2023-12-28 | End: 2023-12-28

## 2023-12-28 RX ORDER — LEVOTHYROXINE SODIUM 88 UG/1
88 TABLET ORAL DAILY
Qty: 90 TABLET | Refills: 3 | Status: SHIPPED | OUTPATIENT
Start: 2023-12-28

## 2023-12-28 RX ORDER — PANTOPRAZOLE SODIUM 20 MG/1
20 TABLET, DELAYED RELEASE ORAL
Qty: 90 TABLET | Refills: 3 | Status: SHIPPED | OUTPATIENT
Start: 2023-12-28 | End: 2024-03-15

## 2023-12-28 RX ADMIN — IOPAMIDOL 97 ML: 755 INJECTION, SOLUTION INTRAVASCULAR at 10:37

## 2023-12-28 RX ADMIN — COVID-19 VACCINE, MRNA 30 MCG: 0.05 INJECTION, SUSPENSION INTRAMUSCULAR at 12:54

## 2023-12-28 ASSESSMENT — PAIN SCALES - GENERAL: PAINLEVEL: NO PAIN (0)

## 2023-12-28 NOTE — LETTER
12/28/2023         RE: Damion Quinones   [address] [facility] [address]        Dear Colleague,    Thank you for referring your patient, Damion Quinones, to the [facility]PL[facility]. Please see a copy of my visit note below.    TRANSPLANT NEPHROLOGY EARLY POST TRANSPLANT VISIT    Assessment & Plan  # DDKT (SLK): Increased creatinine from unclear etiology.  Would like to get drug levels, but will have to wait for future draw as patient took medications today.  Patient feels well and doesn't not appear prerenal.  Acute rejection would also be a possibility with low MPA level, even with taking medications prior to test.  Recommend pushing fluids and will repeat labs and drug levels.  If no improvement in creatinine, would consider a kidney transplant biopsy.   - Baseline Creatinine: ~ 0.8-1.0   - Proteinuria: Normal (<0.2 grams)   - Date DSA Last Checked: Aug/2023      Latest DSA: No, but had DSA to Cw9 w/  at txp   - BK Viremia: No   - Kidney Tx Biopsy: Jun 20, 2023; Result: No diagnostic evidence of acute rejection.  Focal acute tubular injury.  Mild arterial sclerosis.   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  Transaminases Stable.  Followed by Hepatology.     # Immunosuppression: Tacrolimus immediate release (goal 6-8), Mycophenolic acid (dose 540 mg every 12 hours), and Prednisone (dose 5 mg daily)   - Induction with Recent Transplant:   rATG total 2mg/kg, stopped 2/2 sepsis   - Continue with intensive monitoring of immunosuppression for efficacy and toxicity.   - Changes: Not at this time    # Infection Prophylaxis:   - PJP: Sulfa/TMP (Bactrim)  - CMV: None, prophylaxis completed; CMV IgG Ab positive    # Hypertension: Controlled  Goal BP: < 130/80   - Volume status: Mildly hypervolemic     - Changes: No.     # Elevated Blood Glucose: Glucose generally running  ~ 110-150s Last HbA1c: 5.2%   - Management as per primary care.    # Anemia in Chronic Renal Disease: Hgb: Stable      FEDERICO: No   - Iron studies: Low iron saturation; Patient received injectafer the first of a two dose set of IV iron, but had to reschedule the second dose to hospitalization.    # Mineral Bone Disorder:   - Secondary renal hyperparathyroidism; PTH level: Normal (15-65 pg/ml)        On treatment: None  - Vitamin D; level: Normal        On supplement: No  - Calcium; level: Normal        On supplement: No    # Electrolytes:   - Potassium; level: High normal        On supplement: No  - Magnesium; level: Normal        On supplement: Yes  - Bicarbonate; level: Stable low        On supplement: No  - Sodium; level: Trend down, mildly low.  Possibly due to kidney dysfunction.    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  Now off Valcyte.    # CAD, s/p PCI: Asymptomatic.    # Paroxysmal Atrial Fibrillation: Patient had an episode with RVR during recent hospitalization, but not since.  No palpitations on a beta blocker.  Remains on chronic anticoagulation with apixaban.    # H/o ANCA Vasculitis: Patient was previously treated with rituximab x 2 prior to transplant.  No evidence of recurrence.    # Obesity, Class II (BMI = 35.8): Stable weight.   - Recommend weight loss for overall health by increasing exercise and watching caloric intake.    # GERD: Mostly controlled in PPI, but also use of simethicone.    # Gout: Patient reports a significant flare during recent hospitalizations.  He had been off allopurinol at the time.  He was treated with anakinra and now restarted on allopurinol and remains on low dose prednisone.  Stable symptoms.    # Peripheral Neuropathy: Stable symptoms on gabapentin.     # H/o Clostridium difficile Colitis: Patient was positive during recent hospitalization and treated with oral vancomycin.  No diarrhea symptoms at present.    # Umbilical Hernia Repair: Patient is s/p  redo mesh repair of umbilical hernia and minimal DHEERAJ of small bowel 12/7/23. Patient then represented 12/8/23 from home with severe LLQ abdominal pain and N/V 12/8/23.  Back to OR 12/10 and found to have bowel leak and repaired.  On fluconazole for wound culture growing Saccharomyces cerevisiae.    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    Return visit: Return for previously scheduled visit.    Jeovany Scott MD    I spent a total of 40 minutes on the date of the encounter doing chart review, performing a history and physical exam, completing documentation and any further activities as noted above.     Chief Complaint  Mr. Quinones is a 68 year old here for kidney transplant, immunosuppression management and hospital follow up.     History of Present Illness   Mr. Quinones reports feeling good overall with some medical complaints.  Patient was recently admitted for redo of umbilical hernia repair.  He was initially discharged home, but returned with LLQ abdominal pain with associated nausea and vomiting.  Patient ended up back in the OR a couple of days later and found to have a bowel leak, which was repaired and abdominal wash out.  Patient was started on antibiotics and antifungals with nutrition via TPN for  awhile.  Abdominal cultures grew Saccharomyces cerevisiae.  Patient was put on fluconazole with planned treatment for 4 weeks.  His hospital course was also complicated by atrial fibrillation with RVR, but no issues at this time.  He did develop diarrhea inpatient and was positive for Clostridium difficile and was started on oral vancomycin.  In addition, he had an episode of a gout flare while inpatient.  Patient was discharged on 12/20, then saw Transplant Surgery in clinic a couple of days ago.    Since discharge, he has generally been doing better.  His energy level is improving, although not back to normal yet.  He is active and starting to get a little walking done.  Denies any chest pain or shortness of breath with exertion.  Some leg swelling off and on, but overall improved on diuretic.    Appetite is better, but not great yet, although he is starting to feel more hungry.  No nausea, vomiting or diarrhea.  Some heartburn and belching, which he takes pantoprazole and simethicone for.  No fever, sweats or chills.  No night sweats.    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)     HTN, kidney transplant related     Hereditary hemochromatosis (H24)     Dyslipidemia     Tophaceous gout     Deep vein thrombosis (DVT) of non-extremity vein, unspecified chronicity     CKD (chronic kidney disease) stage 2, GFR 60-89 ml/min     Paroxysmal atrial fibrillation (H)     Liver replaced by transplant (H)     Immunosuppressed status (H24)     Kidney replaced by transplant     CAD (coronary artery disease)     Steroid-induced hyperglycemia     Aftercare following organ transplant     HCC (hepatocellular carcinoma) (H)     Peripheral neuropathy     Anemia in chronic renal disease     Class 2 severe obesity due to excess calories with serious comorbidity in adult  (H)     S/P hernia repair     C. difficile colitis     Hypothyroidism     Hypomagnesemia     Need for pneumocystis prophylaxis     Cytomegalovirus (CMV) viremia (H)     GERD (gastroesophageal reflux disease)       Allergies  No Known Allergies    Medications  Current Outpatient Medications   Medication Sig     Alcohol Swabs PADS Use to swab the area of the injection or quyen as directed Per insurance coverage     allopurinol (ZYLOPRIM) 300 MG tablet Take 1 tablet by mouth daily     anakinra (KINERET) 100 MG/0.67ML SOSY injection Inject 0.67 mLs (100 mg) Subcutaneous daily as needed     apixaban ANTICOAGULANT (ELIQUIS ANTICOAGULANT) 5 MG tablet Take 1 tablet (5 mg) by mouth 2 times daily     atorvastatin (LIPITOR) 40 MG tablet Take 1 tablet (40 mg) by mouth every evening     capsaicin (ZOSTRIX) 0.025 % external cream Apply to feet three times per day     fluconazole (DIFLUCAN) 200 MG tablet Take 2 tablets (400 mg) by mouth daily for 16 days     furosemide (LASIX) 20 MG tablet Take 2 tablets (40 mg) by mouth daily     gabapentin (NEURONTIN) 100 MG capsule Take 1 capsule (100 mg) by mouth 3 times daily for 90 days (Patient taking differently: Take 100 mg by mouth 3 times daily 100 mg in am, 200 mg in the afternoon and 100 mg at bedtime by mouth daily)     levothyroxine (SYNTHROID/LEVOTHROID) 88 MCG tablet Take 1 tablet (88 mcg) by mouth daily Takes in the middle of the night daily     magnesium oxide (MAG-OX) 400 MG tablet Take 2 tablets (800 mg) by mouth 2 times daily     metoprolol tartrate (LOPRESSOR) 50 MG tablet Take 1 tablet (50 mg) by mouth 2 times daily for 90 days     multivitamin w/minerals (THERA-VIT-M) tablet Take 1 tablet by mouth daily     MYFORTIC (BRAND) 180 MG EC tablet Take 3 tablets (540 mg) by mouth 2 times daily     pantoprazole (PROTONIX) 20 MG EC tablet Take 1 tablet (20 mg) by mouth every morning (before breakfast)     predniSONE (DELTASONE) 5 MG tablet Take 5 mg by mouth daily     Sharps  Container MISC Use as directed to dispose of needles, lancets and other sharps     simethicone (MYLICON) 125 MG chewable tablet Take 125 mg by mouth 4 times daily as needed for intestinal gas     sulfamethoxazole-trimethoprim (BACTRIM) 400-80 MG tablet Take 1 tablet by mouth daily     tacrolimus (GENERIC) 0.5 MG capsule Take 1 capsule (0.5 mg) by mouth every 12 hours Total dose: 0.5 mg BID     tacrolimus (GENERIC) 1 MG capsule Take 1 capsule (1 mg) by mouth every 12 hours To have available for dose adjustments per transplant team. Discharge dose = 0.5 mg BID. (Patient not taking: Reported on 12/26/2023)     tadalafil (CIALIS) 5 MG tablet Take 1 tablet (5 mg) by mouth daily as needed (take 1-2 tablets at least 30 minutes prior to sexual activity.)     triamcinolone (KENALOG) 0.1 % external ointment Apply topically 2 times daily     vancomycin (VANCOCIN) 125 MG capsule Take 1 capsule (125 mg) by mouth 4 times daily for 8 days, THEN 1 capsule (125 mg) 2 times daily for 14 days.     No current facility-administered medications for this visit.     There are no discontinued medications.      Physical Exam  Vital Signs: /80 (Patient Position: Sitting)   Pulse 90   Temp 98.2  F (36.8  C) (Oral)   Wt 99.8 kg (220 lb)   SpO2 99%   BMI 35.51 kg/m      GENERAL APPEARANCE: alert and no distress  HENT: mouth without ulcers or lesions  RESP: lungs clear to auscultation - no rales, rhonchi or wheezes  CV: regular rhythm, normal rate, no rub, no murmur  EDEMA: trace to 1+ LE edema bilaterally  ABDOMEN: soft, nondistended, nontender, bowel sounds normal, obese; midline incision bandaged.  MS: extremities normal - no gross deformities noted, no evidence of inflammation in joints, no muscle tenderness  SKIN: no rash  TX KIDNEY: normal  DIALYSIS ACCESS:  None    Data        Latest Ref Rng & Units 12/28/2023     1:38 PM 12/28/2023    11:54 AM 12/20/2023     6:15 AM   Renal   Sodium 135 - 145 mmol/L 131   132    K 3.4 - 5.3  mmol/L 5.6   5.2    Cl 98 - 107 mmol/L 96   102    Cl (external) 98 - 107 mmol/L 96   102    CO2 22 - 29 mmol/L 23   21    Urea Nitrogen 8.0 - 23.0 mg/dL 25.9   30.4    Creatinine 0.67 - 1.17 mg/dL 1.66   0.98    Glucose 70 - 99 mg/dL 120   119    Calcium 8.8 - 10.2 mg/dL 9.5   8.6    Magnesium 1.7 - 2.3 mg/dL 1.9  2.0  1.6          Latest Ref Rng & Units 12/28/2023     1:38 PM 12/20/2023     6:15 AM 12/19/2023     5:41 AM   Bone Health   Phosphorus 2.5 - 4.5 mg/dL 3.9  3.2  2.8          Latest Ref Rng & Units 12/28/2023    11:54 AM 12/20/2023     6:15 AM 12/19/2023     5:41 AM   Heme   WBC 4.0 - 11.0 10e3/uL 5.6  11.4  11.9    Hgb 13.3 - 17.7 g/dL 10.6  11.1  11.2    Plt 150 - 450 10e3/uL 334  210  198          Latest Ref Rng & Units 12/28/2023     1:38 PM 12/20/2023     6:15 AM 12/19/2023     5:41 AM   Liver   AP 40 - 150 U/L 406  99  95    TBili <=1.2 mg/dL 0.5  0.3  0.3    Bilirubin Direct 0.00 - 0.30 mg/dL 0.31      ALT 0 - 70 U/L 51  20  19    AST 0 - 45 U/L 36  19  19    Tot Protein 6.4 - 8.3 g/dL 6.3  5.1  5.0    Albumin 3.5 - 5.2 g/dL 3.1  2.6  2.7          Latest Ref Rng & Units 12/28/2023    11:54 AM 9/20/2023     4:11 PM 6/7/2023     3:30 AM   Pancreas   A1C <5.7 % 6.4  5.2     Amylase 28 - 100 U/L   90    Lipase (Roche) 13 - 60 U/L   59          Latest Ref Rng & Units 12/28/2023     1:38 PM 11/21/2023     7:42 AM 11/7/2023     7:53 AM   Iron studies   Iron 61 - 157 ug/dL 33  60  53    Iron Sat Index 15 - 46 % 19  22  23    Ferritin 31 - 409 ng/mL 1,832  204  341          Latest Ref Rng & Units 11/21/2023     7:42 AM 10/27/2023     8:21 AM 10/24/2023     8:06 AM   [facility] Txp Virology   LOG IU/ML OF CMVQNT  1.7      BK Quant Log Ext log IU/mL  Undetected  Undetected    BK Quant Result Ext Undetected IU/mL  Undetected  Undetected    BK Quant Spec Ext    Plasma        Recent Labs   Lab Test 10/10/23  0757 11/07/23  0753 11/21/23  0742 12/10/23  0511 12/18/23  0543 12/19/23  0541 12/20/23  0615   DOSTAC  10/9/2023 11/6/2023 11/20/2023  --   --   --   --    TACROL 10.6 13.1 11.4   < > 9.0 9.2 8.9    < > = values in this interval not displayed.     Recent Labs   Lab Test 09/18/23  0806 10/05/23  0752 11/07/23 0753 11/21/23  0742   DOSMPA 9/17/2023   8:00 PM  --  11/6/2023   7:15 PM  --    MPACID 2.23 0.91* 0.55* 1.08   MPAG 57.8 36.2 48.2 55.1         Again, thank you for allowing me to participate in the care of your patient.        Sincerely,        Jeovany Scott MD

## 2023-12-28 NOTE — DISCHARGE INSTRUCTIONS

## 2023-12-28 NOTE — PROGRESS NOTES
Medication Therapy Management (MTM) Encounter    ASSESSMENT:                            Medication Adherence/Access: No issues identified    Liver and kidney Transplant:    Discussed vaccinations due now that he is 6 months post. See plan for details. Discussed fluconazole Drug interaction with Tacrolimus, patient was instructed to stay in contact with coordinator for dose adjustments when he stops Fluconazole.     Supplements:   Stable.     GERD:   Due to history of CDIF I recommend tapering PPI as it can increase infection risk. He can complete taper when he is off of his current antibiotic regimen.     Hypertension /AFIB  BP near goal <130/80. No changes by MTM.     Hyperlipidemia   Stable.     Gout/ Arthritis:   Stable.     PLAN:                            Due now flu and COVID first, then after these get Shingrix (if you haven't had it before), RSV. After these get a Hepatitis B series.   As soon as you are off fluconazole, be sure to discuss Tacrolimus dose with transplant coordinator.   When off of Vancomycin and fluconazole I would recommend tapering of Pantoprazole as well. Take 40mg every other day x 1 month, then stop. You can use OTC Famotidine 20mg twice daily as needed if you have heartburn.     Follow-up: as needed    SUBJECTIVE/OBJECTIVE:                          Casey Quinones is a 68 year old male called for a transitions of care visit. He was discharged from [facility] on 12/20 for severe abdominal pain post mesh repair/ CDIF infection. Patient was accompanied by Nabila.     Reason for visit: 6 months post txp.    Allergies/ADRs: Reviewed in chart  Past Medical History: Reviewed in chart  Tobacco: He reports that he has never smoked. He has never been exposed to tobacco smoke. He has never used smokeless tobacco.  Alcohol: not currently using    Medication Adherence/Access: no issues reported    Liver and kidney Transplant:    Tacrolimus 0.5mg twice daily,   Prednisone 5mg every morning (for gout, RA,  Arthritis),   Myfortic 540mg twice daily.    Pt reports tremors improved.  Transplant date: 6/6/23  Estimated Creatinine Clearance: 83.5 mL/min (based on SCr of 0.98 mg/dL).  CMV prophylaxis: Completed.  PJP prophylaxis: Bactrim S S daily   Antifungal : Fluconazole 400mg daily.   Other meds: Ursodiol 250mg twice daily.   Infection: CDIF: Vancomycin 125mg 4 TIMES DAILY x 8 days, then 125mg twice daily x 14 days.   Tx Coordinator: Liver-Cindy Clay, kidney- Kathleen Otero MD: Liver- Dr. Clifton, Dr. Poole, Kidney- Dr. Dutta, Using Med Card: Yes  Immunizations: annual flu shot 2022; [id number]:  2016; Prevnar 13: 2023; TDaP:  2021; Shingrix: unknown, HBV: no immunity, COVID: unknown    Supplements: Mag Oxide 800mg twice daily  ( 2 hours from MMF), Multivitamin daily  Lab Results   Component Value Date    MAG 1.6 (L) 12/20/2023    MAG 1.7 12/19/2023     GERD:   Pantoprazole 40 mg once daily  Simethicone 80mg prn once or twice weekly   Only rare heartburn.   Patient feels that current regimen is effective.    Hypertension /AFIB  Eliquis 5mg twice daily  Metoprolol twice daily  Furosemide 40mg daily seeing Dr. Scott today.   Weights: 220 lbs, has been decreasing, was 229 lb post discharge. Mild swelling in feet later in the day.   Patient reports no current medication side effects  Patient self monitors blood pressure.  Home BP monitoring 120-130/70-80s .     BP Readings from Last 3 Encounters:   12/26/23 100/69   12/20/23 120/73   12/07/23 (!) 139/101     Pulse Readings from Last 3 Encounters:   12/26/23 95   12/20/23 89   12/07/23 (P) 77     Hyperlipidemia   Atorvastatin 40mg daily  Patient reports no significant myalgias or other side effects.  The 10-year ASCVD risk score (Tre JOHANSEN, et al., 2019) is: 18.9%    Values used to calculate the score:      Age: 68 years      Sex: Male      Is Non- : No      Diabetic: Yes      Tobacco smoker: No      Systolic Blood Pressure: 100  mmHg      Is BP treated: Yes      HDL Cholesterol: 79 mg/dL      Total Cholesterol: 285 mg/dL   Recent Labs   Lab Test 12/18/23  0543 12/11/23  0327 11/22/22  0848   CHOL  --   --  285*   HDL  --   --  79   LDL  --   --  187*   TRIG 135 92 96     Gout/ Arthritis:   Prednisone 5mg daily  allopurinol 300mg daily  Kineret 100mg daily as needed  Patient reports last gout flare was in the hospital. Patient is experiencing the following medication side effects: none.   Uric Acid   Date Value Ref Range Status   11/07/2023 5.2 3.4 - 7.0 mg/dL Final     Today's Vitals: There were no vitals taken for this visit.  ----------------  Post Discharge Medication Reconciliation Status: discharge medications reconciled and changed, per note/orders.    I spent 20 minutes with this patient today. All changes were made via collaborative practice agreement with Dr. Scott. A copy of the visit note was provided to the patient's provider(s).    A summary of these recommendations was sent via Urgent Group.    Gallo Sharma, PharmD  [facility] Pharmacist    Phone: [phone]     Telemedicine Visit Details  Type of service:  Telephone visit  Start Time: 9:56 AM  End Time: 10:15 AM     Medication Therapy Recommendations  Aftercare following organ transplant    Current Medication: fluconazole (DIFLUCAN) 200 MG tablet   Rationale: Medication interaction - Adverse medication event - Safety   Recommendation: Provide Education   Status: Patient Agreed - Adherence/Education          Rationale: Preventive therapy - Needs additional medication therapy - Indication   Recommendation: Order Vaccine - Shingrix 50 MCG/0.5ML Susr   Status: Accepted - no CPA Needed         Gastroesophageal reflux disease, unspecified whether esophagitis present    Current Medication: pantoprazole (PROTONIX) 40 MG EC tablet   Rationale: No medical indication at this time - Unnecessary medication therapy - Indication   Recommendation: Discontinue Medication   Status: Accepted per CPA

## 2023-12-28 NOTE — LETTER
12/28/2023      RE: Damion Quinones   [address] [facility] [address]       TRANSPLANT NEPHROLOGY EARLY POST TRANSPLANT VISIT    Assessment & Plan  # DDKT (SLK): Increased creatinine from unclear etiology.  Would like to get drug levels, but will have to wait for future draw as patient took medications today.  Patient feels well and doesn't not appear prerenal.  Acute rejection would also be a possibility with low MPA level, even with taking medications prior to test.  Recommend pushing fluids and will repeat labs and drug levels.  If no improvement in creatinine, would consider a kidney transplant biopsy.   - Baseline Creatinine: ~ 0.8-1.0   - Proteinuria: Normal (<0.2 grams)   - Date DSA Last Checked: Aug/2023      Latest DSA: No, but had DSA to Cw9 w/  at txp   - BK Viremia: No   - Kidney Tx Biopsy: Jun 20, 2023; Result: No diagnostic evidence of acute rejection.  Focal acute tubular injury.  Mild arterial sclerosis.   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  Transaminases Stable.  Followed by Hepatology.     # Immunosuppression: Tacrolimus immediate release (goal 6-8), Mycophenolic acid (dose 540 mg every 12 hours), and Prednisone (dose 5 mg daily)   - Induction with Recent Transplant:   rATG total 2mg/kg, stopped 2/2 sepsis   - Continue with intensive monitoring of immunosuppression for efficacy and toxicity.   - Changes: Not at this time    # Infection Prophylaxis:   - PJP: Sulfa/TMP (Bactrim)  - CMV: None, prophylaxis completed; CMV IgG Ab positive    # Hypertension: Controlled  Goal BP: < 130/80   - Volume status: Mildly hypervolemic     - Changes: No.     # Elevated Blood Glucose: Glucose generally running ~ 110-150s Last HbA1c: 5.2%   - Management as per primary care.    # Anemia in Chronic Renal Disease: Hgb: Stable      FEDERICO: No   - Iron studies: Low iron saturation;  Patient received injectafer the first of a two dose set of IV iron, but had to reschedule the second dose to hospitalization.    # Mineral Bone Disorder:   - Secondary renal hyperparathyroidism; PTH level: Normal (15-65 pg/ml)        On treatment: None  - Vitamin D; level: Normal        On supplement: No  - Calcium; level: Normal        On supplement: No    # Electrolytes:   - Potassium; level: High normal        On supplement: No  - Magnesium; level: Normal        On supplement: Yes  - Bicarbonate; level: Stable low        On supplement: No  - Sodium; level: Trend down, mildly low.  Possibly due to kidney dysfunction.    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  Now off Valcyte.    # CAD, s/p PCI: Asymptomatic.    # Paroxysmal Atrial Fibrillation: Patient had an episode with RVR during recent hospitalization, but not since.  No palpitations on a beta blocker.  Remains on chronic anticoagulation with apixaban.    # H/o ANCA Vasculitis: Patient was previously treated with rituximab x 2 prior to transplant.  No evidence of recurrence.    # Obesity, Class II (BMI = 35.8): Stable weight.   - Recommend weight loss for overall health by increasing exercise and watching caloric intake.    # GERD: Mostly controlled in PPI, but also use of simethicone.    # Gout: Patient reports a significant flare during recent hospitalizations.  He had been off allopurinol at the time.  He was treated with anakinra and now restarted on allopurinol and remains on low dose prednisone.  Stable symptoms.    # Peripheral Neuropathy: Stable symptoms on gabapentin.     # H/o Clostridium difficile Colitis: Patient was positive during recent hospitalization and treated with oral vancomycin.  No diarrhea symptoms at present.    # Umbilical Hernia Repair: Patient is s/p redo mesh repair of umbilical hernia and minimal DHEERAJ of small bowel 12/7/23. Patient then represented 12/8/23 from home with severe LLQ abdominal pain and N/V  12/8/23.  Back to OR 12/10 and found to have bowel leak and repaired.  On fluconazole for wound culture growing Saccharomyces cerevisiae.    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    Return visit: Return for previously scheduled visit.    Jeovany Scott MD    I spent a total of 40 minutes on the date of the encounter doing chart review, performing a history and physical exam, completing documentation and any further activities as noted above.     Chief Complaint  Mr. Quinones is a 68 year old here for kidney transplant, immunosuppression management and hospital follow up.     History of Present Illness   Mr. Quinones reports feeling good overall with some medical complaints.  Patient was recently admitted for redo of umbilical hernia repair.  He was initially discharged home, but returned with LLQ abdominal pain with associated nausea and vomiting.  Patient ended up back in the OR a couple of days later and found to have a bowel leak, which was repaired and abdominal wash out.  Patient was started on antibiotics and antifungals with nutrition via TPN for awhile.  Abdominal cultures grew Saccharomyces cerevisiae.  Patient was put on fluconazole with planned treatment for 4 weeks.  His hospital course was also  complicated by atrial fibrillation with RVR, but no issues at this time.  He did develop diarrhea inpatient and was positive for Clostridium difficile and was started on oral vancomycin.  In addition, he had an episode of a gout flare while inpatient.  Patient was discharged on 12/20, then saw Transplant Surgery in clinic a couple of days ago.    Since discharge, he has generally been doing better.  His energy level is improving, although not back to normal yet.  He is active and starting to get a little walking done.  Denies any chest pain or shortness of breath with exertion.  Some leg swelling off and on, but overall improved on diuretic.    Appetite is better, but not great yet, although he is starting to feel more hungry.  No nausea, vomiting or diarrhea.  Some heartburn and belching, which he takes pantoprazole and simethicone for.  No fever, sweats or chills.  No night sweats.    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)     HTN, kidney transplant related     Hereditary hemochromatosis (H24)     Dyslipidemia     Tophaceous gout     Deep vein thrombosis (DVT) of non-extremity vein, unspecified chronicity     CKD (chronic kidney disease) stage 2, GFR 60-89 ml/min     Paroxysmal atrial fibrillation (H)     Liver replaced by transplant (H)     Immunosuppressed status (H24)     Kidney replaced by transplant     CAD (coronary artery disease)     Steroid-induced hyperglycemia     Aftercare following organ transplant     HCC (hepatocellular carcinoma) (H)     Peripheral neuropathy     Anemia in chronic renal disease     Class 2 severe obesity due to excess calories with serious comorbidity in adult (H)     S/P hernia repair     C. difficile colitis     Hypothyroidism     Hypomagnesemia     Need for pneumocystis prophylaxis     Cytomegalovirus (CMV)  viremia (H)     GERD (gastroesophageal reflux disease)       Allergies  No Known Allergies    Medications  Current Outpatient Medications   Medication Sig     Alcohol Swabs PADS Use to swab the area of the injection or quyen as directed Per insurance coverage     allopurinol (ZYLOPRIM) 300 MG tablet Take 1 tablet by mouth daily     anakinra (KINERET) 100 MG/0.67ML SOSY injection Inject 0.67 mLs (100 mg) Subcutaneous daily as needed     apixaban ANTICOAGULANT (ELIQUIS ANTICOAGULANT) 5 MG tablet Take 1 tablet (5 mg) by mouth 2 times daily     atorvastatin (LIPITOR) 40 MG tablet Take 1 tablet (40 mg) by mouth every evening     capsaicin (ZOSTRIX) 0.025 % external cream Apply to feet three times per day     fluconazole (DIFLUCAN) 200 MG tablet Take 2 tablets (400 mg) by mouth daily for 16 days     furosemide (LASIX) 20 MG tablet Take 2 tablets (40 mg) by mouth daily     gabapentin (NEURONTIN) 100 MG capsule Take 1 capsule (100 mg) by mouth 3 times daily for 90 days (Patient taking differently: Take 100 mg by mouth 3 times daily 100 mg in am, 200 mg in the afternoon and 100 mg at bedtime by mouth daily)     levothyroxine (SYNTHROID/LEVOTHROID) 88 MCG tablet Take 1 tablet (88 mcg) by mouth daily Takes in the middle of the night daily     magnesium oxide (MAG-OX) 400 MG tablet Take 2 tablets (800 mg) by mouth 2 times daily     metoprolol tartrate (LOPRESSOR) 50 MG tablet Take 1 tablet (50 mg) by mouth 2 times daily for 90 days     multivitamin w/minerals (THERA-VIT-M) tablet Take 1 tablet by mouth daily     MYFORTIC (BRAND) 180 MG EC tablet Take 3 tablets (540 mg) by mouth 2 times daily     pantoprazole (PROTONIX) 20 MG EC tablet Take 1 tablet (20 mg) by mouth every morning (before breakfast)     predniSONE (DELTASONE) 5 MG tablet Take 5 mg by mouth daily     Sharps Container MISC Use as directed to dispose of needles, lancets and other sharps     simethicone (MYLICON) 125 MG chewable tablet Take 125 mg by mouth 4 times  daily as needed for intestinal gas     sulfamethoxazole-trimethoprim (BACTRIM) 400-80 MG tablet Take 1 tablet by mouth daily     tacrolimus (GENERIC) 0.5 MG capsule Take 1 capsule (0.5 mg) by mouth every 12 hours Total dose: 0.5 mg BID     tacrolimus (GENERIC) 1 MG capsule Take 1 capsule (1 mg) by mouth every 12 hours To have available for dose adjustments per transplant team. Discharge dose = 0.5 mg BID. (Patient not taking: Reported on 12/26/2023)     tadalafil (CIALIS) 5 MG tablet Take 1 tablet (5 mg) by mouth daily as needed (take 1-2 tablets at least 30 minutes prior to sexual activity.)     triamcinolone (KENALOG) 0.1 % external ointment Apply topically 2 times daily     vancomycin (VANCOCIN) 125 MG capsule Take 1 capsule (125 mg) by mouth 4 times daily for 8 days, THEN 1 capsule (125 mg) 2 times daily for 14 days.     No current facility-administered medications for this visit.     There are no discontinued medications.      Physical Exam  Vital Signs: /80 (Patient Position: Sitting)   Pulse 90   Temp 98.2  F (36.8  C) (Oral)   Wt 99.8 kg (220 lb)   SpO2 99%   BMI 35.51 kg/m      GENERAL APPEARANCE: alert and no distress  HENT: mouth without ulcers or lesions  RESP: lungs clear to auscultation - no rales, rhonchi or wheezes  CV: regular rhythm, normal rate, no rub, no murmur  EDEMA: trace to 1+ LE edema bilaterally  ABDOMEN: soft, nondistended, nontender, bowel sounds normal, obese; midline incision bandaged.  MS: extremities normal - no gross deformities noted, no evidence of inflammation in joints, no muscle tenderness  SKIN: no rash  TX KIDNEY: normal  DIALYSIS ACCESS:  None    Data        Latest Ref Rng & Units 12/28/2023     1:38 PM 12/28/2023    11:54 AM 12/20/2023     6:15 AM   Renal   Sodium 135 - 145 mmol/L 131   132    K 3.4 - 5.3 mmol/L 5.6   5.2    Cl 98 - 107 mmol/L 96   102    Cl (external) 98 - 107 mmol/L 96   102    CO2 22 - 29 mmol/L 23   21    Urea Nitrogen 8.0 - 23.0 mg/dL 25.9    30.4    Creatinine 0.67 - 1.17 mg/dL 1.66   0.98    Glucose 70 - 99 mg/dL 120   119    Calcium 8.8 - 10.2 mg/dL 9.5   8.6    Magnesium 1.7 - 2.3 mg/dL 1.9  2.0  1.6          Latest Ref Rng & Units 12/28/2023     1:38 PM 12/20/2023     6:15 AM 12/19/2023     5:41 AM   Bone Health   Phosphorus 2.5 - 4.5 mg/dL 3.9  3.2  2.8          Latest Ref Rng & Units 12/28/2023    11:54 AM 12/20/2023     6:15 AM 12/19/2023     5:41 AM   Heme   WBC 4.0 - 11.0 10e3/uL 5.6  11.4  11.9    Hgb 13.3 - 17.7 g/dL 10.6  11.1  11.2    Plt 150 - 450 10e3/uL 334  210  198          Latest Ref Rng & Units 12/28/2023     1:38 PM 12/20/2023     6:15 AM 12/19/2023     5:41 AM   Liver   AP 40 - 150 U/L 406  99  95    TBili <=1.2 mg/dL 0.5  0.3  0.3    Bilirubin Direct 0.00 - 0.30 mg/dL 0.31      ALT 0 - 70 U/L 51  20  19    AST 0 - 45 U/L 36  19  19    Tot Protein 6.4 - 8.3 g/dL 6.3  5.1  5.0    Albumin 3.5 - 5.2 g/dL 3.1  2.6  2.7          Latest Ref Rng & Units 12/28/2023    11:54 AM 9/20/2023     4:11 PM 6/7/2023     3:30 AM   Pancreas   A1C <5.7 % 6.4  5.2     Amylase 28 - 100 U/L   90    Lipase (Roche) 13 - 60 U/L   59          Latest Ref Rng & Units 12/28/2023     1:38 PM 11/21/2023     7:42 AM 11/7/2023     7:53 AM   Iron studies   Iron 61 - 157 ug/dL 33  60  53    Iron Sat Index 15 - 46 % 19  22  23    Ferritin 31 - 409 ng/mL 1,832  204  341          Latest Ref Rng & Units 11/21/2023     7:42 AM 10/27/2023     8:21 AM 10/24/2023     8:06 AM   [facility] Txp Virology   LOG IU/ML OF CMVQNT  1.7      BK Quant Log Ext log IU/mL  Undetected  Undetected    BK Quant Result Ext Undetected IU/mL  Undetected  Undetected    BK Quant Spec Ext    Plasma        Recent Labs   Lab Test 10/10/23  0757 11/07/23  0753 11/21/23  0742 12/10/23  0511 12/18/23  0543 12/19/23  0541 12/20/23  0615   DOSTAC 10/9/2023 11/6/2023 11/20/2023  --   --   --   --    TACROL 10.6 13.1 11.4   < > 9.0 9.2 8.9    < > = values in this interval not displayed.     Recent Labs   Lab Test  09/18/23  0806 10/05/23  0752 11/07/23  0753 11/21/23  0742   DOSMPA 9/17/2023   8:00 PM  --  11/6/2023   7:15 PM  --    MPACID 2.23 0.91* 0.55* 1.08   MPAG 57.8 36.2 48.2 55.1       Jeovany Scott MD

## 2023-12-28 NOTE — NURSING NOTE
Chief Complaint   Patient presents with    Blood Draw     Labs drawn via PIV by RN.     Labs drawn via previously-placed PIV. Pt tolerated well. Urine collected.     Koki Jackson RN

## 2023-12-28 NOTE — LETTER
12/28/2023         RE: Damion Quinones   [address] [facility] [address]        Dear Colleague,    Thank you for referring your patient, Damion Quinones, to the [facility] [place]. Please see a copy of my visit note below.    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 PMHx also includes CAD, alcohol overuse, obesity, psoriatic arthritis, paroxysmal atrial fibrillation, history of DVT and HTN.    #. 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 HCC noted on explant: moderately differentiated, 2.8cm. RETREAT SCORE: 1 (2.8cm tumor, AFP < 20, no vascular invasion)  #. Iron deficiency anemia  #. Atrial fibrillation + h/o DVT: on apixiban  Patient is doing well post liver and kidney transplant.     His renal function is elevated at 1.66 in the setting of recent C. difficile infection on vancomycin, fluconazole use and slightly decreased appetite; pending repeat tacrolimus trough - unable to obtain today given hemolyzed specimens. Patient also on lasix 20mg daily for fluid management.     His AST and ALT are also slightly elevated to 30-50s with ALP of 406 in the context of recent infection. Patient underwent a CT abdomen pelvis today; pending read. Will plan for repeat liver studies in 2 days and if worsening, will consider a liver biopsy    Plan  -- Follow up CT abdomen pelvis done today  -- Tacrolimus immediate release (goal 6-8), Mycophenolic acid 540mg BID and prednisone 5 mg daily; monitoring of immunosuppression for efficacy and toxicity per protocol. Immunosuppression driven by nephrology given SLK.   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 I also spent part of the time in coordination of care and counseling.    HPI:  DBD LT (and kidney) 6/6/2023 for ETOH + hemochromatosis (homozygous H63D) cirrhosis  - operation: Caval replacement. Portal vein end-to-end anastomosis. Hepatic artery: there were 2 hepatic arteries the right and left; the right is connected to the splenic artery stump. Bile duct end-to-end anastomosis  - explant: 2.8cm HCC (moderately differentiated with no vascular or extra-hepatic extension) in the setting of MASLD cirrhosis  - post-op course   * RTOR on 6/6/203 with concern for low flow in the renal graft - ex-lap, washout, closure with healthy appearing graft with intact arterial and venous flow.    * RTOR on 12/7/23 for hernia repair -> 12/10/2023 for small bowel perforation  repair (+Saccharomyces cerevisiae - treated with fluconazole)   * 12/2023: C. Diff in the setting of hospitalization   - immunosuppression: Tacrolimus + MMF + prednisone    12/7/2023: repair of umbilical hernia and minimal lysis of adhesions around small bowel    Admitted from 12/8-12/20/2023: RTOR for 12/10/2023 - repair of small bowel perforation + abdominal washout. Abdominal cultures +Saccharomyces cerevisiae. Fluconazole x 4 weeks with end of treatment: 1/6/2024. Discharged on Lasix 40mg daily. C diff PCR/Ag+ but toxin negative - per Dr. Clifton was started on Oral vancomycin 125 mg qid x 14 days (12/14-12/27), then 125 mg bid x 14 days (12/28-1/10/2024).    Patient reports doing generally well since his recent admission.  He denies any recent falls or balance issues.  He denies any chest pain or shortness of breath with exertion.  He denies any fevers or cough.  He reports that he is eating well and that his weight has been generally stable as he has been trying to be more conscious of his food decisions.    His abdominal pain is being well-controlled with as needed medications.  He denies any diarrhea or constipation.  He is having 2-3 formed bowel movements per day.  He is still on his vancomycin which was recently switched to twice daily today and he will continue this through January 10, 2024.  He denies any melena or hematochezia.  He denies any nausea or vomiting.    He is currently on tacrolimus, CellCept and prednisone 5 mg/day.  He recently had a gout flare and took Kineret.    He is currently on Lasix 40 mg daily.  He reports mild ankle edema.  His neuropathy is currently being managed with gabapentin which she takes 100 mg in the morning, 200 mg midday and 100 mg in the evening.    Medical hx Surgical hx   Past Medical History:   Diagnosis Date    Alcoholic cirrhosis of liver with ascites (H) 10/11/2019    ANCA-associated vasculitis (H) 2022    Antiplatelet or antithrombotic long-term use      Avascular necrosis (H)     Left femoral head    C. difficile diarrhea     Coronary artery disease     [facility] 4/2023 - complete occlusion of RCA    ESRD (end stage renal disease) on dialysis (H)     Gout     HCC (hepatocellular carcinoma) (H) 09/05/2023    History of hemochromatosis 10/11/2019    Hypertension     Hypothyroidism 12/19/2023    IgA nephropathy     Obesity     PAF (paroxysmal atrial fibrillation) (H)     Pre-diabetes 2023    Psoriatic arthritis (H)     RA (rheumatoid arthritis) (H)     Status post kidney transplant 06/06/2023    Induction with thymoglobulin 4mg/kg, + DSA CW9    Status post liver transplantation (H) 06/06/2023      Past Surgical History:   Procedure Laterality Date    APPENDECTOMY      Removed at 18 Years Old     BENCH KIDNEY  06/06/2023    Procedure: [facility] kidney;  Surgeon: Chad Clifton MD;  Location:  OR    BENCH LIVER  06/06/2023    Procedure: Bench liver;  Surgeon: Chad Clifton MD;  Location:  OR    COLONOSCOPY      2014 at [facility]     CV CORONARY ANGIOGRAM N/A 04/27/2023    Procedure: Coronary Angiogram;  Surgeon: Pablo Araujo MD;  Location:  HEART CARDIAC CATH LAB    EXPLORE GROIN N/A 12/10/2023    Procedure: Umbilical wound exploration, incision and drainage of abcess, exploratory laparotomy, mesh excision, small bowel primary repair;  Surgeon: Chad Clifton MD;  Location:  OR    EYE SURGERY Bilateral     Cataract    H STATISTIC PICC LINE INSERTION >5YR, FAILED Left 06/16/2023    Unable to advance catheter over the axillary area    H STATISTIC PICC LINE INSERTION >5YR, FAILED      HERNIA REPAIR      History of bilateral inguinal hernia repair: 10/28/2014. Open hernia repair: 10/2017. Abdominal wound exploration and debridement 12/27/2017    HERNIORRHAPHY UMBILICAL N/A 12/07/2023    Procedure: HERNIORRHAPHY, UMBILICAL, OPEN, WITH MESH;  Surgeon: Chad Clifton MD;  Location:  OR    INSERT SHUNT PORTAL TRANSJUGULAR  INTRAHEPTIC  2022    IR CVC NON TUNNEL LINE REMOVAL  2023    IR CVC NON TUNNEL PLACEMENT > 5 YRS  2023    IR CVC TUNNEL PLACEMENT > 5 YRS OF AGE  2023    IR CVC TUNNEL PLACEMENT > 5 YRS OF AGE  2023    IR PICC PLACEMENT > 5 YRS OF AGE  2023    IR RENAL BIOPSY RIGHT  2023    picc failed attempt Right 2023    PICC failed attempt Right arm unable to thread the catheter in.    RETURN LIVER TRANSPLANT N/A 2023    Procedure: Return liver transplant. Intra-operative ultrasound;  Surgeon: Chad Clifton MD;  Location: [facility] OR    TRANSPLANT KIDNEY RECIPIENT  DONOR N/A 2023    Procedure: Transplant kidney recipient  donor, ureteral stent placement;  Surgeon: Chad Clifton MD;  Location: [facility] OR    TRANSPLANT LIVER RECIPIENT  DONOR N/A 2023    Procedure: Transplant liver recipient  donor;  Surgeon: Chad Clifton MD;  Location: [facility] OR   + knee replacements x 2       Medications  Current Outpatient Medications   Medication Sig Dispense Refill    levothyroxine (SYNTHROID/LEVOTHROID) 88 MCG tablet Take 1 tablet (88 mcg) by mouth daily Takes in the middle of the night daily 90 tablet 3    pantoprazole (PROTONIX) 20 MG EC tablet Take 1 tablet (20 mg) by mouth every morning (before breakfast) 90 tablet 3    Alcohol Swabs PADS Use to swab the area of the injection or quyen as directed Per insurance coverage 100 each 0    allopurinol (ZYLOPRIM) 300 MG tablet Take 1 tablet by mouth daily      anakinra (KINERET) 100 MG/0.67ML SOSY injection Inject 0.67 mLs (100 mg) Subcutaneous daily as needed      apixaban ANTICOAGULANT (ELIQUIS ANTICOAGULANT) 5 MG tablet Take 1 tablet (5 mg) by mouth 2 times daily 180 tablet 3    atorvastatin (LIPITOR) 40 MG tablet Take 1 tablet (40 mg) by mouth every evening 90 tablet 3    capsaicin (ZOSTRIX) 0.025 % external cream Apply to feet three times per day 50 g 3    fluconazole (DIFLUCAN) 200 MG  tablet Take 2 tablets (400 mg) by mouth daily for 16 days 32 tablet 0    furosemide (LASIX) 20 MG tablet Take 2 tablets (40 mg) by mouth daily 30 tablet 0    gabapentin (NEURONTIN) 100 MG capsule Take 1 capsule (100 mg) by mouth 3 times daily for 90 days (Patient taking differently: Take 100 mg by mouth 3 times daily 100 mg in am, 200 mg in the afternoon and 100 mg at bedtime by mouth daily) 270 capsule 3    magnesium oxide (MAG-OX) 400 MG tablet Take 2 tablets (800 mg) by mouth 2 times daily 60 tablet 0    metoprolol tartrate (LOPRESSOR) 50 MG tablet Take 1 tablet (50 mg) by mouth 2 times daily for 90 days 180 tablet 3    multivitamin w/minerals (THERA-VIT-M) tablet Take 1 tablet by mouth daily 30 tablet 0    MYFORTIC (BRAND) 180 MG EC tablet Take 3 tablets (540 mg) by mouth 2 times daily 540 tablet 3    predniSONE (DELTASONE) 5 MG tablet Take 5 mg by mouth daily 135 tablet 3    Sharps Container MISC Use as directed to dispose of needles, lancets and other sharps 1 each 0    simethicone (MYLICON) 125 MG chewable tablet Take 125 mg by mouth 4 times daily as needed for intestinal gas      sulfamethoxazole-trimethoprim (BACTRIM) 400-80 MG tablet Take 1 tablet by mouth daily 90 tablet 3    tacrolimus (GENERIC) 0.5 MG capsule Take 1 capsule (0.5 mg) by mouth every 12 hours Total dose: 0.5 mg  capsule 3    tacrolimus (GENERIC) 1 MG capsule Take 1 capsule (1 mg) by mouth every 12 hours To have available for dose adjustments per transplant team. Discharge dose = 0.5 mg BID. (Patient not taking: Reported on 12/26/2023)      tadalafil (CIALIS) 5 MG tablet Take 1 tablet (5 mg) by mouth daily as needed (take 1-2 tablets at least 30 minutes prior to sexual activity.) 20 tablet 5    triamcinolone (KENALOG) 0.1 % external ointment Apply topically 2 times daily      vancomycin (VANCOCIN) 125 MG capsule Take 1 capsule (125 mg) by mouth 4 times daily for 8 days, THEN 1 capsule (125 mg) 2 times daily for 14 days. 60 capsule 0  "      Allergies  No Known Allergies    Family hx Social hx   Family History   Problem Relation Age of Onset    Lung Cancer Mother     Colon Cancer Father     Lung Cancer Brother     Heart Failure Brother     Kidney Disease Brother    - Brother: lung cancer in 50s  - Brother: heart disease (unclear what), renal disease (unclear what)  - Brother: colon issues (unclear what)   - Alcohol: None since prior to transplant   - Tobacco: Denies  - Drugs: Denies  -  (Apoorva), has two adult children   - Retired, previously in IT. Hasn't worked sine 2019.  - Stays active.  He has been using up recumbent bike every other day and then walking on days where he does not bike.     Review of systems  A 10-point review of systems was negative.    Examination  /80   Pulse 90   Temp 98.2  F (36.8  C) (Oral)   Ht 1.676 m (5' 6\")   Wt 100.7 kg (222 lb)   SpO2 99%   BMI 35.83 kg/m    Body mass index is 35.83 kg/m .    Gen-NAD  Eye- No conjunctival icterus  ENT- MMM  CVS- RRR, no murmurs  RS- CTA bilaterally  Abd-overweight, soft, nontender.  Surgical scar with sutures in place.  Several prior drain sites covered with bandages -upon removal they are clean around the edges without erythema.  Extr- 2+ radial pulses bilaterally, trace lower extremity edema bilaterally  MS- hands without clubbing  Neuro- A+Ox3  Skin- No jaundice. Duptyrens contractures bilaterally.  Gout in right hand.   Psych- normal mood    Laboratory  BMP  Recent Labs   Lab Test 12/28/23  1338 12/20/23  0615 12/19/23  1156 12/19/23  0942 12/19/23  0744 12/19/23  0541 12/18/23  0742 12/18/23  0543   * 132*  --   --   --  133*  --  137   POTASSIUM 5.6* 5.2  --   --   --  4.7  --  4.8   CHLORIDE 96* 102  --   --   --  102  --  106   NAZARIO 9.5 8.6*  --   --   --  8.4*  --  8.5*   CO2 23 21*  --   --   --  22  --  21*   BUN 25.9* 30.4*  --   --   --  32.2*  --  31.0*   CR 1.66* 0.98  --   --   --  0.91  --  1.00   * 119* 168* 163*   < > 150*   < > 131* "    < > = values in this interval not displayed.     CBC  Recent Labs   Lab Test 12/28/23  1154 12/20/23  0615 12/19/23  0541 12/18/23  0543   WBC 5.6 11.4* 11.9* 9.6   RBC 3.69* 3.87* 3.91* 3.97*   HGB 10.6* 11.1* 11.2* 11.4*   HCT 33.6* 36.9* 37.3* 38.1*   MCV 91 95 95 96   MCH 28.7 28.7 28.6 28.7   MCHC 31.5 30.1* 30.0* 29.9*   RDW 16.4* 17.2* 17.2* 17.0*    210 198 174     Liver Enzymes   Recent Labs   Lab Test 12/28/23  1338   PROTTOTAL 6.3*   ALBUMIN 3.1*   BILITOTAL 0.5   ALKPHOS 406*   AST 36   ALT 51      INR   INR   Date Value Ref Range Status   12/11/2023 1.92 (H) 0.85 - 1.15 Final       Iron sat 41%; ferritin 896  MIGUEL A positive  Hep A IgG negative  Hep B s Ag negative  Hep B s Ab < 2.0  Hep B c Ab negative  Hep C ab negative    Hemochromatosis: negative C282Y, homozygous H63D, negative S65C    Radiology  Abdominal US 12/8/2023  1. Examination is partially limited due to overlying bowel gas.  2. The visualized hepatic vasculature is patent with antegrade flow.  3. Normal grayscale appearance of the transplant liver.  4. Mild splenomegaly.  5. Small volume ascites.    CT Chest w/o Contrast 12/2023  IMPRESSION: No CT evidence of metastatic disease to the lungs. Small  left pleural effusion with associated atelectasis increased from June.  Resolution of previous small right pleural effusion from June.  Atherosclerosis with aortic valve leaflet calcifications comment  please correlate for valvular stenosis. Mitral annular calcifications  also present, please correlate for mitral valve abnormality.    CT Abdomen Pelvis 12/2023  1. Small amount of residual free fluid is present within the abdomen  without any free air to suggest viscus perforation.  2. Stable postsurgical changes of liver and right lower quadrant renal  transplants.  3. Postsurgical changes of umbilical hernia repair with small amount  of air and fluid in the umbilicus, presumably postsurgical.  4. Increased mild anasarca and moderate left  pleural effusion.  5. Splenomegaly.  6. Unchanged avascular necrosis of the left femoral head without  subchondral collapse.    Endoscopy  EGD 9/2022: Small EV, IGV1 oozing, portal HTN gastropathy, IGV2, no gastric ulcers, normal duodenum    Colonoscopy 11/2023    Path: Tubular adenoma x3

## 2023-12-28 NOTE — PROGRESS NOTES
TRANSPLANT NEPHROLOGY EARLY POST TRANSPLANT VISIT    Assessment & Plan   # DDKT (SLK): Increased creatinine from unclear etiology.  Would like to get drug levels, but will have to wait for future draw as patient took medications today.  Patient feels well and doesn't not appear prerenal.  Acute rejection would also be a possibility with low MPA level, even with taking medications prior to test.  Recommend pushing fluids and will repeat labs and drug levels.  If no improvement in creatinine, would consider a kidney transplant biopsy.   - Baseline Creatinine: ~ 0.8-1.0   - Proteinuria: Normal (<0.2 grams)   - Date DSA Last Checked: Aug/2023      Latest DSA: No, but had DSA to Cw9 w/  at txp   - BK Viremia: No   - Kidney Tx Biopsy: Jun 20, 2023; Result: No diagnostic evidence of acute rejection.  Focal acute tubular injury.  Mild arterial sclerosis.   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  Transaminases Stable.  Followed by Hepatology.     # Immunosuppression: Tacrolimus immediate release (goal 6-8), Mycophenolic acid (dose 540 mg every 12 hours), and Prednisone (dose 5 mg daily)   - Induction with Recent Transplant:   rATG total 2mg/kg, stopped 2/2 sepsis   - Continue with intensive monitoring of immunosuppression for efficacy and toxicity.   - Changes: Not at this time    # Infection Prophylaxis:   - PJP: Sulfa/TMP (Bactrim)  - CMV: None, prophylaxis completed; CMV IgG Ab positive    # Hypertension: Controlled  Goal BP: < 130/80   - Volume status: Mildly hypervolemic     - Changes: No.     # Elevated Blood Glucose: Glucose generally running ~ 110-150s Last HbA1c: 5.2%   - Management as per primary care.    # Anemia in Chronic Renal Disease: Hgb: Stable      FEDERICO: No   - Iron studies: Low iron saturation; Patient received injectafer the first of a two dose set of IV iron, but had to  reschedule the second dose to hospitalization.    # Mineral Bone Disorder:   - Secondary renal hyperparathyroidism; PTH level: Normal (15-65 pg/ml)        On treatment: None  - Vitamin D; level: Normal        On supplement: No  - Calcium; level: Normal        On supplement: No    # Electrolytes:   - Potassium; level: High normal        On supplement: No  - Magnesium; level: Normal        On supplement: Yes  - Bicarbonate; level: Stable low        On supplement: No  - Sodium; level: Trend down, mildly low.  Possibly due to kidney dysfunction.    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  Now off Valcyte.    # CAD, s/p PCI: Asymptomatic.    # Paroxysmal Atrial Fibrillation: Patient had an episode with RVR during recent hospitalization, but not since.  No palpitations on a beta blocker.  Remains on chronic anticoagulation with apixaban.    # H/o ANCA Vasculitis: Patient was previously treated with rituximab x 2 prior to transplant.  No evidence of recurrence.    # Obesity, Class II (BMI = 35.8): Stable weight.   - Recommend weight loss for overall health by increasing exercise and watching caloric intake.    # GERD: Mostly controlled in PPI, but also use of simethicone.    # Gout: Patient reports a significant flare during recent hospitalizations.  He had been off allopurinol at the time.  He was treated with anakinra and now restarted on allopurinol and remains on low dose prednisone.  Stable symptoms.    # Peripheral Neuropathy: Stable symptoms on gabapentin.     # H/o Clostridium difficile Colitis: Patient was positive during recent hospitalization and treated with oral vancomycin.  No diarrhea symptoms at present.    # Umbilical Hernia Repair: Patient is s/p redo mesh repair of umbilical hernia and minimal DHEERAJ of small bowel 12/7/23. Patient then represented 12/8/23 from home with severe LLQ abdominal pain and N/V 12/8/23.  Back to OR 12/10 and found to have bowel leak and repaired.  On fluconazole  for wound culture growing Saccharomyces cerevisiae.    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    Return visit: Return for previously scheduled visit.    Jeovany Scott MD    I spent a total of 40 minutes on the date of the encounter doing chart review, performing a history and physical exam, completing documentation and any further activities as noted above.     Chief Complaint   Mr. Quinones is a 68 year old here for kidney transplant, immunosuppression management and hospital follow up.     History of Present Illness    Mr. Quinones reports feeling good overall with some medical complaints.  Patient was recently admitted for redo of umbilical hernia repair.  He was initially discharged home, but returned with LLQ abdominal pain with associated nausea and vomiting.  Patient ended up back in the OR a couple of days later and found to have a bowel leak, which was repaired and abdominal wash out.  Patient was started on antibiotics and antifungals with nutrition via TPN for awhile.  Abdominal cultures grew Saccharomyces cerevisiae.  Patient was put on fluconazole with planned treatment for 4 weeks.  His hospital course was also complicated by atrial fibrillation with RVR, but no issues at this time.  He did develop  diarrhea inpatient and was positive for Clostridium difficile and was started on oral vancomycin.  In addition, he had an episode of a gout flare while inpatient.  Patient was discharged on 12/20, then saw Transplant Surgery in clinic a couple of days ago.    Since discharge, he has generally been doing better.  His energy level is improving, although not back to normal yet.  He is active and starting to get a little walking done.  Denies any chest pain or shortness of breath with exertion.  Some leg swelling off and on, but overall improved on diuretic.    Appetite is better, but not great yet, although he is starting to feel more hungry.  No nausea, vomiting or diarrhea.  Some heartburn and belching, which he takes pantoprazole and simethicone for.  No fever, sweats or chills.  No night sweats.    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)    HTN, kidney transplant related    Hereditary hemochromatosis (H24)    Dyslipidemia    Tophaceous gout    Deep vein thrombosis (DVT) of non-extremity vein, unspecified chronicity    CKD (chronic kidney disease) stage 2, GFR 60-89 ml/min    Paroxysmal atrial fibrillation (H)    Liver replaced by transplant (H)    Immunosuppressed status (H24)    Kidney replaced by transplant    CAD (coronary artery disease)    Steroid-induced hyperglycemia    Aftercare following organ transplant    HCC (hepatocellular carcinoma) (H)    Peripheral neuropathy    Anemia in chronic renal disease    Class 2 severe obesity due to excess calories with serious comorbidity in adult (H)    S/P hernia repair    C. difficile colitis    Hypothyroidism    Hypomagnesemia    Need for pneumocystis prophylaxis    Cytomegalovirus (CMV) viremia (H)    GERD (gastroesophageal reflux disease)       Allergies   No Known Allergies    Medications   Current  Outpatient Medications   Medication Sig    Alcohol Swabs PADS Use to swab the area of the injection or quyen as directed Per insurance coverage    allopurinol (ZYLOPRIM) 300 MG tablet Take 1 tablet by mouth daily    anakinra (KINERET) 100 MG/0.67ML SOSY injection Inject 0.67 mLs (100 mg) Subcutaneous daily as needed    apixaban ANTICOAGULANT (ELIQUIS ANTICOAGULANT) 5 MG tablet Take 1 tablet (5 mg) by mouth 2 times daily    atorvastatin (LIPITOR) 40 MG tablet Take 1 tablet (40 mg) by mouth every evening    capsaicin (ZOSTRIX) 0.025 % external cream Apply to feet three times per day    fluconazole (DIFLUCAN) 200 MG tablet Take 2 tablets (400 mg) by mouth daily for 16 days    furosemide (LASIX) 20 MG tablet Take 2 tablets (40 mg) by mouth daily    gabapentin (NEURONTIN) 100 MG capsule Take 1 capsule (100 mg) by mouth 3 times daily for 90 days (Patient taking differently: Take 100 mg by mouth 3 times daily 100 mg in am, 200 mg in the afternoon and 100 mg at bedtime by mouth daily)    levothyroxine (SYNTHROID/LEVOTHROID) 88 MCG tablet Take 1 tablet (88 mcg) by mouth daily Takes in the middle of the night daily    magnesium oxide (MAG-OX) 400 MG tablet Take 2 tablets (800 mg) by mouth 2 times daily    metoprolol tartrate (LOPRESSOR) 50 MG tablet Take 1 tablet (50 mg) by mouth 2 times daily for 90 days    multivitamin w/minerals (THERA-VIT-M) tablet Take 1 tablet by mouth daily    MYFORTIC (BRAND) 180 MG EC tablet Take 3 tablets (540 mg) by mouth 2 times daily    pantoprazole (PROTONIX) 20 MG EC tablet Take 1 tablet (20 mg) by mouth every morning (before breakfast)    predniSONE (DELTASONE) 5 MG tablet Take 5 mg by mouth daily    Sharps Container MISC Use as directed to dispose of needles, lancets and other sharps    simethicone (MYLICON) 125 MG chewable tablet Take 125 mg by mouth 4 times daily as needed for intestinal gas    sulfamethoxazole-trimethoprim (BACTRIM) 400-80 MG tablet Take 1 tablet by mouth daily     tacrolimus (GENERIC) 0.5 MG capsule Take 1 capsule (0.5 mg) by mouth every 12 hours Total dose: 0.5 mg BID    tacrolimus (GENERIC) 1 MG capsule Take 1 capsule (1 mg) by mouth every 12 hours To have available for dose adjustments per transplant team. Discharge dose = 0.5 mg BID. (Patient not taking: Reported on 12/26/2023)    tadalafil (CIALIS) 5 MG tablet Take 1 tablet (5 mg) by mouth daily as needed (take 1-2 tablets at least 30 minutes prior to sexual activity.)    triamcinolone (KENALOG) 0.1 % external ointment Apply topically 2 times daily    vancomycin (VANCOCIN) 125 MG capsule Take 1 capsule (125 mg) by mouth 4 times daily for 8 days, THEN 1 capsule (125 mg) 2 times daily for 14 days.     No current facility-administered medications for this visit.     There are no discontinued medications.      Physical Exam   Vital Signs: /80 (Patient Position: Sitting)   Pulse 90   Temp 98.2  F (36.8  C) (Oral)   Wt 99.8 kg (220 lb)   SpO2 99%   BMI 35.51 kg/m      GENERAL APPEARANCE: alert and no distress  HENT: mouth without ulcers or lesions  RESP: lungs clear to auscultation - no rales, rhonchi or wheezes  CV: regular rhythm, normal rate, no rub, no murmur  EDEMA: trace to 1+ LE edema bilaterally  ABDOMEN: soft, nondistended, nontender, bowel sounds normal, obese; midline incision bandaged.  MS: extremities normal - no gross deformities noted, no evidence of inflammation in joints, no muscle tenderness  SKIN: no rash  TX KIDNEY: normal  DIALYSIS ACCESS:  None    Data         Latest Ref Rng & Units 12/28/2023     1:38 PM 12/28/2023    11:54 AM 12/20/2023     6:15 AM   Renal   Sodium 135 - 145 mmol/L 131   132    K 3.4 - 5.3 mmol/L 5.6   5.2    Cl 98 - 107 mmol/L 96   102    Cl (external) 98 - 107 mmol/L 96   102    CO2 22 - 29 mmol/L 23   21    Urea Nitrogen 8.0 - 23.0 mg/dL 25.9   30.4    Creatinine 0.67 - 1.17 mg/dL 1.66   0.98    Glucose 70 - 99 mg/dL 120   119    Calcium 8.8 - 10.2 mg/dL 9.5   8.6     Magnesium 1.7 - 2.3 mg/dL 1.9  2.0  1.6          Latest Ref Rng & Units 12/28/2023     1:38 PM 12/20/2023     6:15 AM 12/19/2023     5:41 AM   Bone Health   Phosphorus 2.5 - 4.5 mg/dL 3.9  3.2  2.8          Latest Ref Rng & Units 12/28/2023    11:54 AM 12/20/2023     6:15 AM 12/19/2023     5:41 AM   Heme   WBC 4.0 - 11.0 10e3/uL 5.6  11.4  11.9    Hgb 13.3 - 17.7 g/dL 10.6  11.1  11.2    Plt 150 - 450 10e3/uL 334  210  198          Latest Ref Rng & Units 12/28/2023     1:38 PM 12/20/2023     6:15 AM 12/19/2023     5:41 AM   Liver   AP 40 - 150 U/L 406  99  95    TBili <=1.2 mg/dL 0.5  0.3  0.3    Bilirubin Direct 0.00 - 0.30 mg/dL 0.31      ALT 0 - 70 U/L 51  20  19    AST 0 - 45 U/L 36  19  19    Tot Protein 6.4 - 8.3 g/dL 6.3  5.1  5.0    Albumin 3.5 - 5.2 g/dL 3.1  2.6  2.7          Latest Ref Rng & Units 12/28/2023    11:54 AM 9/20/2023     4:11 PM 6/7/2023     3:30 AM   Pancreas   A1C <5.7 % 6.4  5.2     Amylase 28 - 100 U/L   90    Lipase (Roche) 13 - 60 U/L   59          Latest Ref Rng & Units 12/28/2023     1:38 PM 11/21/2023     7:42 AM 11/7/2023     7:53 AM   Iron studies   Iron 61 - 157 ug/dL 33  60  53    Iron Sat Index 15 - 46 % 19  22  23    Ferritin 31 - 409 ng/mL 1,832  204  341          Latest Ref Rng & Units 11/21/2023     7:42 AM 10/27/2023     8:21 AM 10/24/2023     8:06 AM   [facility] Txp Virology   LOG IU/ML OF CMVQNT  1.7      BK Quant Log Ext log IU/mL  Undetected  Undetected    BK Quant Result Ext Undetected IU/mL  Undetected  Undetected    BK Quant Spec Ext    Plasma        Recent Labs   Lab Test 10/10/23  0757 11/07/23  0753 11/21/23  0742 12/10/23  0511 12/18/23  0543 12/19/23  0541 12/20/23  0615   DOSTAC 10/9/2023 11/6/2023 11/20/2023  --   --   --   --    TACROL 10.6 13.1 11.4   < > 9.0 9.2 8.9    < > = values in this interval not displayed.     Recent Labs   Lab Test 09/18/23  0806 10/05/23  0752 11/07/23  0753 11/21/23  0742   DOSMPA 9/17/2023   8:00 PM  --  11/6/2023   7:15 PM  --     MPACID 2.23 0.91* 0.55* 1.08   MPAG 57.8 36.2 48.2 55.1

## 2023-12-28 NOTE — NURSING NOTE
Frailty Assessment Note: Not frail    Overall Score 1/5    Weight:   Wt Readings from Last 3 Encounters:   12/28/23 100.7 kg (222 lb)   12/26/23 99.8 kg (220 lb)   12/19/23 104.5 kg (230 lb 6.4 oz)     Height: 167.6 cm    Weight lost over 10lbs in last year: No    Reported Exhaustion/Energy Levels: Moderate    Slowness: 4 seconds / 15 feet walking (average of 2 attempts)    Low Activity Level Score: 0      Strength: 27.3 (average of 3 trials on dominant hand)      Gurwinder Muñoz RN on 12/28/2023 at 12:14 PM

## 2023-12-28 NOTE — PATIENT INSTRUCTIONS
"Recommendations from today's MTM visit:                                                      Due now flu and COVID first, then after these get Shingrix (if you haven't had it before), RSV. After these get a Hepatitis B series.   As soon as you are off fluconazole, be sure to discuss Tacrolimus dose with transplant coordinator.   When off of Vancomycin and fluconazole I would recommend tapering of Pantoprazole as well. Take 40mg every other day x 1 month, then stop. You can use OTC Famotidine 20mg twice daily as needed if you have heartburn.     Follow-up: as needed     It was great speaking with you today.  I value your experience and would be very thankful for your time in providing feedback in our clinic survey. In the next few days, you may receive an email or text message from [employer] with a link to a survey related to your  clinical pharmacist.\"     To schedule another MTM appointment, please call the clinic directly or you may call the MTM scheduling line at [phone] or toll-free at [phone].     My Clinical Pharmacist's contact information:                                                      Please feel free to contact me with any questions or concerns you have.      Gallo Sharma, PharmD  MTM Pharmacist    Phone: [phone]     "

## 2023-12-28 NOTE — NURSING NOTE
Chief Complaint   Patient presents with    RECHECK     K/P POST ACUTE TXP NEPH     /80 (Patient Position: Sitting)   Pulse 90   Temp 98.2  F (36.8  C) (Oral)   Wt 99.8 kg (220 lb)   SpO2 99%   BMI 35.51 kg/m    Truong Merida CMA on 12/28/2023 at 12:21 PM

## 2023-12-28 NOTE — PATIENT INSTRUCTIONS
Patient Recommendations:  - No new recommendations at this time.    Transplant Patient Information  Your Post Transplant Coordinator is: Angelita Torres  For non urgent items, we encourage you to contact your coordinator/care team online via [employer] [place]  You and your care team can also contact your transplant coordinator Monday - Friday, 8am - 5pm at [phone] (Option 2 to reach the coordinator or Option 4 to schedule an appointment).  After hours for urgent matters, please call [facility] at [phone].

## 2023-12-29 ENCOUNTER — PATIENT OUTREACH (OUTPATIENT)
Dept: CARE COORDINATION | Facility: CLINIC | Age: 68
End: 2023-12-29
Payer: MEDICARE

## 2023-12-29 DIAGNOSIS — Z94.0 KIDNEY REPLACED BY TRANSPLANT: ICD-10-CM

## 2023-12-29 DIAGNOSIS — Z94.4 LIVER REPLACED BY TRANSPLANT (H): Primary | ICD-10-CM

## 2023-12-29 LAB
BKV DNA # SPEC NAA+PROBE: NOT DETECTED COPIES/ML
MYCOPHENOLATE SERPL LC/MS/MS-MCNC: <0.25 MG/L (ref 1–3.5)
MYCOPHENOLATE-G SERPL LC/MS/MS-MCNC: 60.9 MG/L (ref 30–95)
MYELOPEROXIDASE AB SER IA-ACNC: 0.3 U/ML
MYELOPEROXIDASE AB SER IA-ACNC: NEGATIVE
PROTEINASE3 AB SER IA-ACNC: <1 U/ML
PROTEINASE3 AB SER IA-ACNC: NEGATIVE
TME LAST DOSE: ABNORMAL H
TME LAST DOSE: ABNORMAL H

## 2023-12-29 NOTE — PROGRESS NOTES
Anemia Management Note  SUBJECTIVE/OBJECTIVE:  Referred by Dr. Subhash Dutta on 2023  Primary Diagnosis: Anemia of Other Chronic Illness (D63.8)     Secondary Diagnosis:  Organ or tissue replaced by transplant, kidney (Z94.0)  Kidney Tx: 2023  Hgb goal range:  9-10  Epo/Darbo: Aranesp  40 mcg  every two weeks for Hgb <10.0. In clinic  *If Aranesp is ordered dose at 0.45mcg/kg  Iron regimen:  NA  Injectafer completed [id number]     Labs : 2024  Recent FEDERICO use, transfusion, IV iron: Mircera   RX/TX plans : 2024     *[facility]; [phone]      Hx of DVT (2023), Afib, anticoagulate.      Consent to Communicate:  OK to speak with Nabila Quinones (wife) per consent to communicate dated 10/27/2022. No Boxes Checked on Consent to Communicate.            Latest Ref Rng & Units 2023     5:37 AM 2023     5:24 PM 2023     5:43 AM 2023     5:41 AM 2023     6:15 AM 2023    11:54 AM 2023     1:38 PM   Anemia   Hemoglobin 13.3 - 17.7 g/dL 9.8  10.4  11.4  11.2  11.1  10.6     Ferritin 31 - 409 ng/mL       1,832      BP Readings from Last 3 Encounters:   23 133/80   23 133/80   23 100/69     Wt Readings from Last 2 Encounters:   23 100.7 kg (222 lb)   23 99.8 kg (220 lb)           ASSESSMENT:  Hgb:at goal - continue to monitor  TSat: not at goal (>30%) but ferritin >1000ng/mL.  PO iron not indicated at this time per anemia protocol.   Ferritin: elevated(>1000ng/mL)    PLAN:  Casey received one dose of Injectafer 750mg on 23.  Second dose was not given d/t being admitted.  Per note from Dr. Scott on 23, Casey will reschedule 2nd dose.    Orders needed to be renewed (for next follow-up date) in EPIC: None    Iron labs due:  End of 2024    Plan discussed with:  No call, chart review       NEXT FOLLOW-UP DATE:  24    Molly Fenton RN   Anemia Services  [facility]  [email or web address]   Office :  [phone]  Fax: [phone]

## 2023-12-30 LAB
ALP BONE SERPL-CCNC: 113 U/L
ALP LIVER SERPL-CCNC: 292 U/L
ALP OTHER SERPL-CCNC: 0 U/L
ALP SERPL-CCNC: 405 U/L

## 2024-01-01 LAB
GLUCOSE BLDC GLUCOMTR-MCNC: 126 MG/DL (ref 70–99)
GLUCOSE BLDC GLUCOMTR-MCNC: 143 MG/DL (ref 70–99)
GLUCOSE BLDC GLUCOMTR-MCNC: 145 MG/DL (ref 70–99)

## 2024-01-02 ENCOUNTER — TELEPHONE (OUTPATIENT)
Dept: GASTROENTEROLOGY | Facility: CLINIC | Age: 69
End: 2024-01-02

## 2024-01-02 ENCOUNTER — LAB (OUTPATIENT)
Dept: LAB | Facility: CLINIC | Age: 69
End: 2024-01-02
Payer: COMMERCIAL

## 2024-01-02 ENCOUNTER — TELEPHONE (OUTPATIENT)
Dept: TRANSPLANT | Facility: CLINIC | Age: 69
End: 2024-01-02

## 2024-01-02 DIAGNOSIS — N18.2 ANEMIA IN STAGE 2 CHRONIC KIDNEY DISEASE: ICD-10-CM

## 2024-01-02 DIAGNOSIS — Z94.4 LIVER TRANSPLANT RECIPIENT (H): ICD-10-CM

## 2024-01-02 DIAGNOSIS — Z94.0 KIDNEY REPLACED BY TRANSPLANT: ICD-10-CM

## 2024-01-02 DIAGNOSIS — D63.1 ANEMIA IN STAGE 2 CHRONIC KIDNEY DISEASE: ICD-10-CM

## 2024-01-02 DIAGNOSIS — Z94.4 LIVER REPLACED BY TRANSPLANT (H): ICD-10-CM

## 2024-01-02 LAB
ALBUMIN MFR UR ELPH: 14.9 MG/DL
ALBUMIN SERPL BCG-MCNC: 3.2 G/DL (ref 3.5–5.2)
ALBUMIN UR-MCNC: ABNORMAL MG/DL
ALP SERPL-CCNC: 292 U/L (ref 40–150)
ALT SERPL W P-5'-P-CCNC: 34 U/L (ref 0–70)
ANION GAP SERPL CALCULATED.3IONS-SCNC: 14 MMOL/L (ref 7–15)
APPEARANCE UR: CLEAR
AST SERPL W P-5'-P-CCNC: ABNORMAL U/L
BACTERIA #/AREA URNS HPF: ABNORMAL /HPF
BASOPHILS # BLD AUTO: NORMAL 10*3/UL
BASOPHILS # BLD MANUAL: 0.1 10E3/UL (ref 0–0.2)
BASOPHILS NFR BLD AUTO: NORMAL %
BASOPHILS NFR BLD MANUAL: 2 %
BILIRUB DIRECT SERPL-MCNC: ABNORMAL MG/DL
BILIRUB SERPL-MCNC: 0.4 MG/DL
BILIRUB UR QL STRIP: NEGATIVE
BUN SERPL-MCNC: 25 MG/DL (ref 8–23)
CALCIUM SERPL-MCNC: 9.4 MG/DL (ref 8.8–10.2)
CHLORIDE SERPL-SCNC: 97 MMOL/L (ref 98–107)
COLOR UR AUTO: YELLOW
CREAT SERPL-MCNC: 1.71 MG/DL (ref 0.67–1.17)
CREAT UR-MCNC: 74.3 MG/DL
DEPRECATED HCO3 PLAS-SCNC: 22 MMOL/L (ref 22–29)
EGFRCR SERPLBLD CKD-EPI 2021: 44 ML/MIN/1.73M2
EOSINOPHIL # BLD AUTO: NORMAL 10*3/UL
EOSINOPHIL # BLD MANUAL: 0 10E3/UL (ref 0–0.7)
EOSINOPHIL NFR BLD AUTO: NORMAL %
EOSINOPHIL NFR BLD MANUAL: 0 %
ERYTHROCYTE [DISTWIDTH] IN BLOOD BY AUTOMATED COUNT: 16.4 % (ref 10–15)
GLUCOSE SERPL-MCNC: 96 MG/DL (ref 70–99)
GLUCOSE UR STRIP-MCNC: NEGATIVE MG/DL
HCT VFR BLD AUTO: 33.6 % (ref 40–53)
HGB BLD-MCNC: 10.4 G/DL (ref 13.3–17.7)
HGB UR QL STRIP: NEGATIVE
IMM GRANULOCYTES # BLD: NORMAL 10*3/UL
IMM GRANULOCYTES NFR BLD: NORMAL %
KETONES UR STRIP-MCNC: NEGATIVE MG/DL
LEUKOCYTE ESTERASE UR QL STRIP: ABNORMAL
LYMPHOCYTES # BLD AUTO: NORMAL 10*3/UL
LYMPHOCYTES # BLD MANUAL: 1.9 10E3/UL (ref 0.8–5.3)
LYMPHOCYTES NFR BLD AUTO: NORMAL %
LYMPHOCYTES NFR BLD MANUAL: 28 %
MAGNESIUM SERPL-MCNC: 1.9 MG/DL (ref 1.7–2.3)
MCH RBC QN AUTO: 28.3 PG (ref 26.5–33)
MCHC RBC AUTO-ENTMCNC: 31 G/DL (ref 31.5–36.5)
MCV RBC AUTO: 92 FL (ref 78–100)
METAMYELOCYTES # BLD MANUAL: 0.1 10E3/UL
METAMYELOCYTES NFR BLD MANUAL: 2 %
MONOCYTES # BLD AUTO: NORMAL 10*3/UL
MONOCYTES # BLD MANUAL: 0.3 10E3/UL (ref 0–1.3)
MONOCYTES NFR BLD AUTO: NORMAL %
MONOCYTES NFR BLD MANUAL: 4 %
NEUTROPHILS # BLD AUTO: NORMAL 10*3/UL
NEUTROPHILS # BLD MANUAL: 4.3 10E3/UL (ref 1.6–8.3)
NEUTROPHILS NFR BLD AUTO: NORMAL %
NEUTROPHILS NFR BLD MANUAL: 64 %
NITRATE UR QL: NEGATIVE
NRBC # BLD AUTO: 0 10E3/UL
NRBC # BLD AUTO: 0.1 10E3/UL
NRBC BLD AUTO-RTO: 0 /100
NRBC BLD MANUAL-RTO: 1 %
PH UR STRIP: 7 [PH] (ref 5–7)
PHOSPHATE SERPL-MCNC: 4 MG/DL (ref 2.5–4.5)
PLAT MORPH BLD: ABNORMAL
PLATELET # BLD AUTO: 310 10E3/UL (ref 150–450)
POLYCHROMASIA BLD QL SMEAR: SLIGHT
POTASSIUM SERPL-SCNC: 5.2 MMOL/L (ref 3.4–5.3)
PROT SERPL-MCNC: 6.1 G/DL (ref 6.4–8.3)
PROT/CREAT 24H UR: 0.2 MG/MG CR (ref 0–0.2)
RBC # BLD AUTO: 3.67 10E6/UL (ref 4.4–5.9)
RBC #/AREA URNS AUTO: ABNORMAL /HPF
RBC MORPH BLD: ABNORMAL
SODIUM SERPL-SCNC: 133 MMOL/L (ref 135–145)
SP GR UR STRIP: 1.01 (ref 1–1.03)
SQUAMOUS #/AREA URNS AUTO: ABNORMAL /LPF
TACROLIMUS BLD-MCNC: 16.2 UG/L (ref 5–15)
TME LAST DOSE: ABNORMAL H
TME LAST DOSE: ABNORMAL H
UROBILINOGEN UR STRIP-ACNC: 0.2 E.U./DL
WBC # BLD AUTO: 6.3 10E3/UL (ref 4–11)
WBC # BLD AUTO: 6.7 10E3/UL (ref 4–11)
WBC #/AREA URNS AUTO: ABNORMAL /HPF

## 2024-01-02 PROCEDURE — 82247 BILIRUBIN TOTAL: CPT

## 2024-01-02 PROCEDURE — 36415 COLL VENOUS BLD VENIPUNCTURE: CPT

## 2024-01-02 PROCEDURE — 80069 RENAL FUNCTION PANEL: CPT

## 2024-01-02 PROCEDURE — 84075 ASSAY ALKALINE PHOSPHATASE: CPT

## 2024-01-02 PROCEDURE — 87086 URINE CULTURE/COLONY COUNT: CPT

## 2024-01-02 PROCEDURE — 85027 COMPLETE CBC AUTOMATED: CPT

## 2024-01-02 PROCEDURE — 84156 ASSAY OF PROTEIN URINE: CPT

## 2024-01-02 PROCEDURE — 80197 ASSAY OF TACROLIMUS: CPT

## 2024-01-02 PROCEDURE — 84155 ASSAY OF PROTEIN SERUM: CPT

## 2024-01-02 PROCEDURE — 85007 BL SMEAR W/DIFF WBC COUNT: CPT | Mod: QW

## 2024-01-02 PROCEDURE — 84460 ALANINE AMINO (ALT) (SGPT): CPT

## 2024-01-02 PROCEDURE — 81001 URINALYSIS AUTO W/SCOPE: CPT

## 2024-01-02 PROCEDURE — 83735 ASSAY OF MAGNESIUM: CPT

## 2024-01-02 NOTE — TELEPHONE ENCOUNTER
Subhash Dutta MD  1/2/2024  9:18 AM CST Back to Top      Any symptoms of UTI other than the increase in Scr?       Urine culture negative

## 2024-01-03 ENCOUNTER — TELEPHONE (OUTPATIENT)
Dept: TRANSPLANT | Facility: CLINIC | Age: 69
End: 2024-01-03

## 2024-01-03 ENCOUNTER — MYC REFILL (OUTPATIENT)
Dept: TRANSPLANT | Facility: CLINIC | Age: 69
End: 2024-01-03

## 2024-01-03 ENCOUNTER — TELEPHONE (OUTPATIENT)
Dept: GASTROENTEROLOGY | Facility: CLINIC | Age: 69
End: 2024-01-03

## 2024-01-03 DIAGNOSIS — Z94.4 LIVER TRANSPLANT RECIPIENT (H): ICD-10-CM

## 2024-01-03 DIAGNOSIS — Z94.0 KIDNEY REPLACED BY TRANSPLANT: Primary | ICD-10-CM

## 2024-01-03 DIAGNOSIS — Z79.52 LONG TERM (CURRENT) USE OF SYSTEMIC STEROIDS: ICD-10-CM

## 2024-01-03 DIAGNOSIS — Z94.4 LIVER TRANSPLANT RECIPIENT (H): Primary | ICD-10-CM

## 2024-01-03 DIAGNOSIS — Z94.0 KIDNEY REPLACED BY TRANSPLANT: ICD-10-CM

## 2024-01-03 LAB — BACTERIA UR CULT: NORMAL

## 2024-01-03 RX ORDER — MYCOPHENOLIC ACID 180 MG/1
540 TABLET, DELAYED RELEASE ORAL 2 TIMES DAILY
Qty: 540 TABLET | Refills: 3 | Status: SHIPPED | OUTPATIENT
Start: 2024-01-03 | End: 2024-05-15

## 2024-01-03 NOTE — TELEPHONE ENCOUNTER
Call to Casey and Nabila per her request via [employer] message to discuss a variety of topics.     Tacrolimus level yesterday was high at 16.2, good 12 hour level, likely related to fluconazole. Creatinine also elevated. Message to Mela Bermudez and Tra regarding whether to change tac to suspension form to go below 0.5 mg on dosage, change tac to daily instead of BID or to stop fluconazole a few days early. Dr. Dutta responded to just change dose to daily for duration of fluconazole (ends 1/5 after am dose). Relayed this change to Nabila and Casey, will restart tac 0.5 mg BID on Saturday. Dr. Bermudez wants labs rechecked in 1 week along with a vitamin D and PTH. Lab orders for Monday 1/8 entered and lab appt made before visit with Dr. Clifton on 1/8.     Reviewed vaccines needed, RSV only at this time. Nabila is going to figure out through [employer] when Casey's last Hep B series was and if or when he got the shingrix vaccine.     Nabila stated that Dr. Bermudez had wanted Casey to get 2 more doses of injectafer, but that his insurance only approved 1 more dose, which he will get on 1/10. Message to Dr. Bermudez to inform her that he will get one more and that if any more are needed, it will have to be reordered and approved by insurance.

## 2024-01-03 NOTE — TELEPHONE ENCOUNTER
Post Kidney and Pancreas Transplant Team Conference  Date: 1/3/2024  Transplant Coordinator: Angelita Torres     Attendees:  [x]  Dr. Scott [x] Angelita Torres, RN [x] Mirella Holcomb LPN     [x]  Dr. Patino [x] Nitza Ahmadi, RN [] Hermelinda Koo LPN    [x] Dr. Dutta [x] Danielle Street RN    [] Dr. Marrero [x] Virginia Hunter, RN [] Charito Crowder RN   [] Dr. Gonzalez [] Margaret Steven, RN    [] Dr. Tanner [] Nidhi Hodges RN    []  Dr. White [] Koki White, RN    [] Dr. Garcia [] Osmani Gomes RN    [] Lorna Naik NP [] Debbie Peacock RN    [] Sera Agrawal NP [] Cindy Mc RN        Verbal Plan Read Back:   Recommend kidney ultrasound      Routed to RN Coordinator   Mirella Holcomb LPN

## 2024-01-04 ENCOUNTER — TELEPHONE (OUTPATIENT)
Dept: TRANSPLANT | Facility: CLINIC | Age: 69
End: 2024-01-04
Payer: MEDICARE

## 2024-01-04 DIAGNOSIS — Z94.0 KIDNEY REPLACED BY TRANSPLANT: Primary | ICD-10-CM

## 2024-01-04 RX ORDER — FUROSEMIDE 20 MG
20 TABLET ORAL DAILY
Qty: 30 TABLET | Refills: 0 | Status: SHIPPED | OUTPATIENT
Start: 2024-01-04 | End: 2024-02-26

## 2024-01-04 RX ORDER — PREDNISONE 5 MG/1
5 TABLET ORAL DAILY
Qty: 90 TABLET | Refills: 3 | Status: SHIPPED | OUTPATIENT
Start: 2024-01-04

## 2024-01-04 RX ORDER — FUROSEMIDE 20 MG
20 TABLET ORAL DAILY
Qty: 30 TABLET | Refills: 1 | Status: SHIPPED | OUTPATIENT
Start: 2024-01-04 | End: 2024-01-08

## 2024-01-04 RX ORDER — PREDNISONE 5 MG/1
5 TABLET ORAL DAILY
Qty: 30 TABLET | Refills: 11 | Status: SHIPPED | OUTPATIENT
Start: 2024-01-04 | End: 2024-01-08

## 2024-01-04 NOTE — TELEPHONE ENCOUNTER
Plan to decrease tacrolimus  level and  decrease furosemide lasix   to 20 mg twice per day   Repeat Basic Metabolic Panel in one week  prior to imaging   Reviewed with Dr Scott

## 2024-01-04 NOTE — TELEPHONE ENCOUNTER
Tyler, MD Brian Hicks, Angelita GRAVES RN  Let's hold furosemide for a day, then decrease furosemide down to 20 mg daily.  He doesn't need any further volume off, but don't want him to start gaining weight back, so would like to change dose on diuretics to maintain his weight.  Now if he keeps losing weight on the 20 mg, then we can hold it altogether.    Yes, he should remain on prednisone 5 mg.      Follow up phone call with Nabila wife    Nabila verbalized understanding with plan

## 2024-01-07 LAB
BACTERIA SPEC CULT: ABNORMAL
BACTERIA TISS BX CULT: ABNORMAL
BACTERIA WND CULT: NO GROWTH

## 2024-01-08 ENCOUNTER — OFFICE VISIT (OUTPATIENT)
Dept: TRANSPLANT | Facility: CLINIC | Age: 69
End: 2024-01-08
Attending: TRANSPLANT SURGERY
Payer: COMMERCIAL

## 2024-01-08 ENCOUNTER — TELEPHONE (OUTPATIENT)
Dept: TRANSPLANT | Facility: CLINIC | Age: 69
End: 2024-01-08

## 2024-01-08 ENCOUNTER — LAB (OUTPATIENT)
Dept: LAB | Facility: CLINIC | Age: 69
End: 2024-01-08
Payer: COMMERCIAL

## 2024-01-08 VITALS
WEIGHT: 213.1 LBS | DIASTOLIC BLOOD PRESSURE: 91 MMHG | OXYGEN SATURATION: 97 % | SYSTOLIC BLOOD PRESSURE: 132 MMHG | HEIGHT: 66 IN | HEART RATE: 99 BPM | BODY MASS INDEX: 34.25 KG/M2 | TEMPERATURE: 98.9 F

## 2024-01-08 DIAGNOSIS — Z94.4 LIVER TRANSPLANT RECIPIENT (H): ICD-10-CM

## 2024-01-08 DIAGNOSIS — Z94.4 LIVER TRANSPLANT RECIPIENT (H): Primary | ICD-10-CM

## 2024-01-08 DIAGNOSIS — Z79.52 LONG TERM (CURRENT) USE OF SYSTEMIC STEROIDS: ICD-10-CM

## 2024-01-08 DIAGNOSIS — R94.5 ABNORMAL RESULTS OF LIVER FUNCTION STUDIES: ICD-10-CM

## 2024-01-08 DIAGNOSIS — R94.5 ABNORMAL RESULTS OF LIVER FUNCTION STUDIES: Primary | ICD-10-CM

## 2024-01-08 DIAGNOSIS — Z94.0 KIDNEY REPLACED BY TRANSPLANT: ICD-10-CM

## 2024-01-08 LAB
ALBUMIN SERPL BCG-MCNC: 3.7 G/DL (ref 3.5–5.2)
ALP SERPL-CCNC: 273 U/L (ref 40–150)
ALT SERPL W P-5'-P-CCNC: 44 U/L (ref 0–70)
ANION GAP SERPL CALCULATED.3IONS-SCNC: 11 MMOL/L (ref 7–15)
AST SERPL W P-5'-P-CCNC: 38 U/L (ref 0–45)
BILIRUB DIRECT SERPL-MCNC: <0.2 MG/DL (ref 0–0.3)
BILIRUB SERPL-MCNC: 0.4 MG/DL
BUN SERPL-MCNC: 26.9 MG/DL (ref 8–23)
CALCIUM SERPL-MCNC: 10 MG/DL (ref 8.8–10.2)
CHLORIDE SERPL-SCNC: 101 MMOL/L (ref 98–107)
CREAT SERPL-MCNC: 1.46 MG/DL (ref 0.67–1.17)
DEPRECATED HCO3 PLAS-SCNC: 26 MMOL/L (ref 22–29)
EGFRCR SERPLBLD CKD-EPI 2021: 53 ML/MIN/1.73M2
ERYTHROCYTE [DISTWIDTH] IN BLOOD BY AUTOMATED COUNT: 16.9 % (ref 10–15)
GLUCOSE SERPL-MCNC: 110 MG/DL (ref 70–99)
HCT VFR BLD AUTO: 37 % (ref 40–53)
HGB BLD-MCNC: 11.3 G/DL (ref 13.3–17.7)
MCH RBC QN AUTO: 27.8 PG (ref 26.5–33)
MCHC RBC AUTO-ENTMCNC: 30.5 G/DL (ref 31.5–36.5)
MCV RBC AUTO: 91 FL (ref 78–100)
PLATELET # BLD AUTO: 273 10E3/UL (ref 150–450)
POTASSIUM SERPL-SCNC: 5 MMOL/L (ref 3.4–5.3)
PROT SERPL-MCNC: 6.7 G/DL (ref 6.4–8.3)
PTH-INTACT SERPL-MCNC: 20 PG/ML (ref 15–65)
RBC # BLD AUTO: 4.06 10E6/UL (ref 4.4–5.9)
SODIUM SERPL-SCNC: 138 MMOL/L (ref 135–145)
TACROLIMUS BLD-MCNC: 10 UG/L (ref 5–15)
TME LAST DOSE: NORMAL H
TME LAST DOSE: NORMAL H
VIT D+METAB SERPL-MCNC: 43 NG/ML (ref 20–50)
WBC # BLD AUTO: 6.2 10E3/UL (ref 4–11)

## 2024-01-08 PROCEDURE — 36415 COLL VENOUS BLD VENIPUNCTURE: CPT | Performed by: PATHOLOGY

## 2024-01-08 PROCEDURE — 80197 ASSAY OF TACROLIMUS: CPT | Performed by: INTERNAL MEDICINE

## 2024-01-08 PROCEDURE — 83970 ASSAY OF PARATHORMONE: CPT | Performed by: PATHOLOGY

## 2024-01-08 PROCEDURE — 80053 COMPREHEN METABOLIC PANEL: CPT | Performed by: PATHOLOGY

## 2024-01-08 PROCEDURE — 85027 COMPLETE CBC AUTOMATED: CPT | Performed by: PATHOLOGY

## 2024-01-08 PROCEDURE — G0463 HOSPITAL OUTPT CLINIC VISIT: HCPCS | Performed by: TRANSPLANT SURGERY

## 2024-01-08 PROCEDURE — 84080 ASSAY ALKALINE PHOSPHATASES: CPT | Mod: 90 | Performed by: PATHOLOGY

## 2024-01-08 PROCEDURE — 99000 SPECIMEN HANDLING OFFICE-LAB: CPT | Performed by: PATHOLOGY

## 2024-01-08 PROCEDURE — 82306 VITAMIN D 25 HYDROXY: CPT | Performed by: INTERNAL MEDICINE

## 2024-01-08 PROCEDURE — 82248 BILIRUBIN DIRECT: CPT | Performed by: PATHOLOGY

## 2024-01-08 PROCEDURE — 99213 OFFICE O/P EST LOW 20 MIN: CPT | Mod: 24 | Performed by: TRANSPLANT SURGERY

## 2024-01-08 PROCEDURE — 99213 OFFICE O/P EST LOW 20 MIN: CPT | Performed by: TRANSPLANT SURGERY

## 2024-01-08 RX ORDER — URSODIOL 250 MG/1
250 TABLET, FILM COATED ORAL 2 TIMES DAILY
Qty: 180 TABLET | Refills: 3 | Status: SHIPPED | OUTPATIENT
Start: 2024-01-08

## 2024-01-08 NOTE — PROGRESS NOTES
Transplant Surgery -OUTPATIENT IMMUNOSUPPRESSION PROGRESS NOTE    Date of Visit: 01/08/2024    Transplants:  6/6/2023 (Liver), 6/6/2023 (Kidney); Postoperative day:  216 (Liver), 216 (Kidney)  ASSESMENT AND PLAN:  1.Graft Function:Liver allograft: no rejection or technical problems.  Kidney allograft: no rejection or technical problems;   2.Immunosuppression Management:keep tacrolimus levels at 8 ng/dL  3.Hypertension:ok  4.Renal Function:better  5.Lab frequency: q monthly  6.Other:  Wound healing, dressings changed and sutures removed    Date: January 8, 2024    Transplant:  [x]                             Liver [x]                              Kidney [x]                             Pancreas []                              Other:             Chief Complaint:  Doing well    History of Present Illness:  Patient Active Problem List   Diagnosis     Psoriatic arthritis (H)     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)     HTN, kidney transplant related     Hereditary hemochromatosis (H24)     Dyslipidemia     Tophaceous gout     Deep vein thrombosis (DVT) of non-extremity vein, unspecified chronicity     CKD (chronic kidney disease) stage 2, GFR 60-89 ml/min     Paroxysmal atrial fibrillation (H)     Liver replaced by transplant (H)     Immunosuppressed status (H24)     Kidney replaced by transplant     CAD (coronary artery disease)     Steroid-induced hyperglycemia     Aftercare following organ transplant     HCC (hepatocellular carcinoma) (H)     Peripheral neuropathy     Anemia in chronic renal disease     Class 2 severe obesity due to excess calories with serious comorbidity in adult (H)     S/P hernia repair     C. difficile colitis     Hypothyroidism     Hypomagnesemia     Need for pneumocystis prophylaxis     Cytomegalovirus (CMV) viremia (H)     GERD (gastroesophageal reflux disease)     SOCIAL /FAMILY HISTORY: [x]                  No recent change    Past  Medical History:   Diagnosis Date     Alcoholic cirrhosis of liver with ascites (H) 10/11/2019     ANCA-associated vasculitis (H) 2022     Antiplatelet or antithrombotic long-term use      Avascular necrosis (H)     Left femoral head     C. difficile diarrhea      Coronary artery disease     [facility] 4/2023 - complete occlusion of RCA     ESRD (end stage renal disease) on dialysis (H)      Gout      HCC (hepatocellular carcinoma) (H) 09/05/2023     History of hemochromatosis 10/11/2019     Hypertension      Hypothyroidism 12/19/2023     IgA nephropathy      Obesity      PAF (paroxysmal atrial fibrillation) (H)      Pre-diabetes 2023     Psoriatic arthritis (H)      RA (rheumatoid arthritis) (H)      Status post kidney transplant 06/06/2023    Induction with thymoglobulin 4mg/kg, + DSA CW9     Status post liver transplantation (H) 06/06/2023     Past Surgical History:   Procedure Laterality Date     APPENDECTOMY      Removed at 18 Years Old      BENCH KIDNEY  06/06/2023    Procedure: Bench kidney;  Surgeon: Chad Clifton MD;  Location:  OR     BENCH LIVER  06/06/2023    Procedure: Bench liver;  Surgeon: Chad Clifton MD;  Location:  OR     COLONOSCOPY      2014 at [facility]      CV CORONARY ANGIOGRAM N/A 04/27/2023    Procedure: Coronary Angiogram;  Surgeon: Pablo Araujo MD;  Location:  HEART CARDIAC CATH LAB     EXPLORE GROIN N/A 12/10/2023    Procedure: Umbilical wound exploration, incision and drainage of abcess, exploratory laparotomy, mesh excision, small bowel primary repair;  Surgeon: Chad Clifton MD;  Location:  OR     EYE SURGERY Bilateral     Cataract     H STATISTIC PICC LINE INSERTION >5YR, FAILED Left 06/16/2023    Unable to advance catheter over the axillary area     H STATISTIC PICC LINE INSERTION >5YR, FAILED       HERNIA REPAIR      History of bilateral inguinal hernia repair: 10/28/2014. Open hernia repair: 10/2017. Abdominal wound exploration and  debridement 2017     HERNIORRHAPHY UMBILICAL N/A 2023    Procedure: HERNIORRHAPHY, UMBILICAL, OPEN, WITH MESH;  Surgeon: Chad Clifton MD;  Location: [facility] OR     INSERT SHUNT PORTAL TRANSJUGULAR INTRAHEPTIC  2022     IR CVC NON TUNNEL LINE REMOVAL  2023     IR CVC NON TUNNEL PLACEMENT > 5 YRS  2023     IR CVC TUNNEL PLACEMENT > 5 YRS OF AGE  2023     IR CVC TUNNEL PLACEMENT > 5 YRS OF AGE  2023     IR PICC PLACEMENT > 5 YRS OF AGE  2023     IR RENAL BIOPSY RIGHT  2023     picc failed attempt Right 2023    PICC failed attempt Right arm unable to thread the catheter in.     RETURN LIVER TRANSPLANT N/A 2023    Procedure: Return liver transplant. Intra-operative ultrasound;  Surgeon: Chad Clifton MD;  Location: [facility] OR     TRANSPLANT KIDNEY RECIPIENT  DONOR N/A 2023    Procedure: Transplant kidney recipient  donor, ureteral stent placement;  Surgeon: Chad Clifton MD;  Location: [facility] OR     TRANSPLANT LIVER RECIPIENT  DONOR N/A 2023    Procedure: Transplant liver recipient  donor;  Surgeon: Chad Clifton MD;  Location: [facility] OR     Social History     Socioeconomic History     Marital status:      Spouse name: Not on file     Number of children: Not on file     Years of education: Not on file     Highest education level: Not on file   Occupational History     Not on file   Tobacco Use     Smoking status: Never     Passive exposure: Never     Smokeless tobacco: Never   Vaping Use     Vaping Use: Never used   Substance and Sexual Activity     Alcohol use: Not Currently     Comment: Last ETOH use was 2021     Drug use: Not Currently     Sexual activity: Not on file   Other Topics Concern     Parent/sibling w/ CABG, MI or angioplasty before 65F 55M? Not Asked   Social History Narrative     Not on file     Social Determinants of Health     Financial Resource Strain: Not on file    Food Insecurity: Not on file   Transportation Needs: Not on file   Physical Activity: Not on file   Stress: Not on file   Social Connections: Not on file   Interpersonal Safety: Not on file   Housing Stability: Not on file     Prescription Medications as of 1/8/2024       Rx Number Disp Refills Start End Last Dispensed Date Next Fill Date Owning Pharmacy    Alcohol Swabs PADS  100 each 0 7/25/2023 --   [facility] [place], [place] - [address]    Sig: Use to swab the area of the injection or quyen as directed Per insurance coverage    Class: E-Prescribe    allopurinol (ZYLOPRIM) 300 MG tablet  -- -- 11/24/2023 --       Sig: Take 1 tablet by mouth daily    Class: Historical    Route: Oral    anakinra (KINERET) 100 MG/0.67ML SOSY injection  -- -- 11/7/2023 --       Sig: Inject 0.67 mLs (100 mg) Subcutaneous daily as needed    Class: Historical    Route: Subcutaneous    apixaban ANTICOAGULANT (ELIQUIS ANTICOAGULANT) 5 MG tablet  180 tablet 3 8/22/2023 --   [facility] [place] [address] [address] AT [facility]    Sig: Take 1 tablet (5 mg) by mouth 2 times daily    Class: E-Prescribe    Route: Oral    atorvastatin (LIPITOR) 40 MG tablet  90 tablet 3 11/28/2023 --   [facility] [address] [place], [place] [address] [address] AT [facility]    Sig: Take 1 tablet (40 mg) by mouth every evening    Class: E-Prescribe    Route: Oral    capsaicin (ZOSTRIX) 0.025 % external cream  50 g 3 11/29/2023 --   [facility] [address] [place], [place] [address] [address] AT [facility] &  [facility]    Sig: Apply to feet three times per day    Class: E-Prescribe    furosemide (LASIX) 20 MG tablet  30 tablet 0 1/4/2024 --   [facility] [address] [place], [place] [address] [address] AT [facility]    Sig: Take 1 tablet (20 mg) by mouth daily    Class: E-Prescribe    Route: Oral    gabapentin (NEURONTIN) 100 MG capsule  270 capsule 3 11/7/2023 2/5/2024   [facility] [address] [place], [place] [address] [address]  AT [facility]    Sig: Take 1 capsule (100 mg) by mouth 3 times daily for 90 days    Class: E-Prescribe    Route: Oral    levothyroxine (SYNTHROID/LEVOTHROID) 88 MCG tablet  90 tablet 3 12/28/2023 --   [id number] [place] [id number]  AT [facility]    Sig: Take 1 tablet (88 mcg) by mouth daily Takes in the middle of the night daily    Class: E-Prescribe    Route: Oral    magnesium oxide (MAG-OX) 400 MG tablet  60 tablet 0 8/16/2023 --   [facility] [place] [address] [address]    Sig: Take 2 tablets (800 mg) by mouth 2 times daily    Class: E-Prescribe    Route: Oral    metoprolol tartrate (LOPRESSOR) 50 MG tablet  180 tablet 3 11/7/2023 2/5/2024   [facility] [address] [place], [place] [address] [address]  AT [facility]    Sig: Take 1 tablet (50 mg) by mouth 2 times daily for 90 days    Class: E-Prescribe    Route: Oral    multivitamin w/minerals (THERA-VIT-M) tablet  30 tablet 0 8/16/2023 --   [facility] [place] [address] [address]    Sig: Take 1 tablet by mouth daily    Class: E-Prescribe    Route: Oral    MYFORTIC (BRAND) 180 MG EC tablet  540 tablet 3 1/3/2024 --   [facility] [place] [address] [address]    Sig: Take 3 tablets (540 mg) by mouth 2 times daily    Class: E-Prescribe    Route: Oral    pantoprazole (PROTONIX) 20 MG EC tablet  90 tablet 3 12/28/2023 --   [facility] [place], [place] [address] [address]  AT [facility]    Sig: Take 1 tablet (20 mg) by mouth every morning (before breakfast)    Class: E-Prescribe    Route: Oral    predniSONE (DELTASONE) 5 MG tablet  90 tablet 3 1/4/2024 --   [facility] [address] [place], [place] [address] [address] [address] AT [facility] MA[address] & [address]    Sig: Take 1 tablet (5 mg) by mouth daily    Class: E-Prescribe    Route: Oral    [employer] [facility]   1 each 0 7/25/2023 --   [facility] [place], [place] - [address]    Sig: Use as directed to dispose of needles, lancets and other sharps    Class: E-Prescribe    simethicone (MYLICON) 125 MG chewable tablet  -- --  --       Sig: Take 125 mg by mouth 4 times daily as needed for intestinal gas    Class: Historical    Route: Oral    sulfamethoxazole-trimethoprim (BACTRIM) 400-80 MG tablet  90 tablet 3 8/17/2023 --   [facility] [place], [place] - [address]    Sig: Take 1 tablet by mouth daily    Class: E-Prescribe    Route: Oral    tacrolimus (GENERIC) 0.5 MG capsule  180 capsule 3 12/20/2023 --   [facility] [place], [place] - [address]    Sig: Take 1 capsule (0.5 mg) by mouth every 12 hours Total dose: 0.5 mg BID    Class: E-Prescribe    Notes to Pharmacy: TXP DT 6/6/2023 (Liver), 6/6/2023 (Kidney) TXP Dischg DT 6/25/2023 DX Kidney replaced by transplant Z94.0 [facility] ([place], [place])    Route: Oral    tadalafil (CIALIS) 5 MG tablet  20 tablet 5 11/28/2023 --   [id number] [place], [place] - [address]  AT [facility] &  MM    Sig: Take 1 tablet (5 mg) by mouth daily as needed (take 1-2 tablets at least 30 minutes prior to sexual activity.)    Class: E-Prescribe    Route: Oral    triamcinolone (KENALOG) 0.1 % external ointment  -- -- 11/8/2023 --       Sig: Apply topically 2 times daily    Class: Historical    Route: Topical    vancomycin (VANCOCIN) 125 MG capsule  60 capsule 0 12/20/2023 1/11/2024   [facility] #[id number] [place], [place] - [address]  AT [facility] &  MM    Sig: Take 1 capsule (125 mg) by mouth 4 times daily for 8 days, THEN 1 capsule (125 mg) 2 times daily for 14 days.    Class: E-Prescribe    Route: Oral    tacrolimus (GENERIC) 1 MG capsule  -- -- 12/20/2023 --       Sig: Take 1 capsule (1 mg) by mouth every 12 hours To have  "available for dose adjustments per transplant team. Discharge dose = 0.5 mg BID.    Class: No Print Out    Route: Oral        Patient has no known allergies.   REVIEW OF SYSTEMS (check box if normal)  [x]               GENERAL  [x]                 PULMONARY [x]                GENITOURINARY  [x]                CNS                 [x]                 CARDIAC  [x]                 ENDOCRINE  [x]                EARS,NOSE,THROAT [x]                 GASTROINTESTINAL [x]                 NEUROLOGIC    [x]                MUSCLOSKELTAL  [x]                  HEMATOLOGY      PHYSICAL EXAM (check box if normal)BP (!) 132/91   Pulse 99   Temp 98.9  F (37.2  C) (Oral)   Ht 1.676 m (5' 6\")   Wt 96.7 kg (213 lb 1.6 oz)   SpO2 97%   BMI 34.40 kg/m          [x]            GENERAL:    [x]       EYES:  ICTERIC   []        YES  []                    NO  [x]           EXTREMITIES: Clubbing []       Y     [x]           N    [x]           EARS, NOSE, THROAT: Membranes Moist    YES   [x]                   NO []                  [x]           LUNGS:  CLEAR    YES       [x]                  NO    []                                [x]           SKIN: Jaundice           YES       []                  NO    [x]                   Rash: YES       []                  NO    [x]                                     [x]             HEART: Regular Rate          YES       [x]                  NO    []                   Incision Clean:  YES       [x]                  NO    []                                [x]                    ABDOMEN: Organomegaly YES       []                  NO    [x]                       [x]                    NEUROLOGICAL:  Nonfocal  YES       [x]                  NO    []                       [x]                    Hernia YES       []                  NO    [x]                   PSYCHIATRIC:  Appropriate  YES       [x]                  NO    []                       OTHER:                                                       "                                             PAIN SCALE:: 3

## 2024-01-08 NOTE — TELEPHONE ENCOUNTER
Per Dr. Bermudez- jerri Peña scheduled for liver transplant US that was ordered 12/28 and restart ursodiol. Last ursodiol dose was 250 mg BID. Ursodiol reordered.     Watsi message sent to Casey to inform him to restart ursodiol and to get the US scheduled.

## 2024-01-08 NOTE — LETTER
1/8/2024         RE: Damion Quinones   [address] [facility] [address]        Dear Colleague,    Thank you for referring your patient, Damion Quinones, to the [facility] TRANSPL[facility]. Please see a copy of my visit note below.    Transplant Surgery -OUTPATIENT IMMUNOSUPPRESSION PROGRESS NOTE    Date of Visit: 01/08/2024    Transplants:  6/6/2023 (Liver), 6/6/2023 (Kidney); Postoperative day:  216 (Liver), 216 (Kidney)  ASSESMENT AND PLAN:  1.Graft Function:Liver allograft: no rejection or technical problems.  Kidney allograft: no rejection or technical problems;   2.Immunosuppression Management:keep tacrolimus levels at 8 ng/dL  3.Hypertension:ok  4.Renal Function:better  5.Lab frequency: q monthly  6.Other:  Wound healing, dressings changed and sutures removed    Date: January 8, 2024    Transplant:  [x]                             Liver [x]                              Kidney [x]                             Pancreas []                              Other:             Chief Complaint:  Doing well    History of Present Illness:  Patient Active Problem List   Diagnosis     Psoriatic arthritis (H)     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)     HTN, kidney transplant related     Hereditary hemochromatosis (H24)     Dyslipidemia     Tophaceous gout     Deep vein thrombosis (DVT) of non-extremity vein, unspecified chronicity     CKD (chronic kidney disease) stage 2, GFR 60-89 ml/min     Paroxysmal atrial fibrillation (H)     Liver replaced by transplant (H)     Immunosuppressed status (H24)     Kidney replaced by transplant     CAD (coronary artery disease)     Steroid-induced hyperglycemia     Aftercare following organ transplant     HCC (hepatocellular carcinoma) (H)     Peripheral neuropathy     Anemia in chronic renal disease     Class 2 severe obesity due to excess calories with serious comorbidity in adult (H)     S/P hernia repair     C.  difficile colitis     Hypothyroidism     Hypomagnesemia     Need for pneumocystis prophylaxis     Cytomegalovirus (CMV) viremia (H)     GERD (gastroesophageal reflux disease)     SOCIAL /FAMILY HISTORY: [x]                  No recent change    Past Medical History:   Diagnosis Date     Alcoholic cirrhosis of liver with ascites (H) 10/11/2019     ANCA-associated vasculitis (H) 2022     Antiplatelet or antithrombotic long-term use      Avascular necrosis (H)     Left femoral head     C. difficile diarrhea      Coronary artery disease     [facility] 4/2023 - complete occlusion of RCA     ESRD (end stage renal disease) on dialysis (H)      Gout      HCC (hepatocellular carcinoma) (H) 09/05/2023     History of hemochromatosis 10/11/2019     Hypertension      Hypothyroidism 12/19/2023     IgA nephropathy      Obesity      PAF (paroxysmal atrial fibrillation) (H)      Pre-diabetes 2023     Psoriatic arthritis (H)      RA (rheumatoid arthritis) (H)      Status post kidney transplant 06/06/2023    Induction with thymoglobulin 4mg/kg, + DSA CW9     Status post liver transplantation (H) 06/06/2023     Past Surgical History:   Procedure Laterality Date     APPENDECTOMY      Removed at 18 Years Old      BENCH KIDNEY  06/06/2023    Procedure: Bench kidney;  Surgeon: Chad Clfiton MD;  Location:  OR     BENCH LIVER  06/06/2023    Procedure: Bench liver;  Surgeon: Chad Clifton MD;  Location:  OR     COLONOSCOPY      2014 at [facility]      CV CORONARY ANGIOGRAM N/A 04/27/2023    Procedure: Coronary Angiogram;  Surgeon: Pablo Araujo MD;  Location:  HEART CARDIAC CATH LAB     EXPLORE GROIN N/A 12/10/2023    Procedure: Umbilical wound exploration, incision and drainage of abcess, exploratory laparotomy, mesh excision, small bowel primary repair;  Surgeon: Chad Clifton MD;  Location:  OR     EYE SURGERY Bilateral     Cataract     H STATISTIC PICC LINE INSERTION >5YR, FAILED Left  2023    Unable to advance catheter over the axillary area     H STATISTIC PICC LINE INSERTION >5YR, FAILED       HERNIA REPAIR      History of bilateral inguinal hernia repair: 10/28/2014. Open hernia repair: 10/2017. Abdominal wound exploration and debridement 2017     HERNIORRHAPHY UMBILICAL N/A 2023    Procedure: HERNIORRHAPHY, UMBILICAL, OPEN, WITH MESH;  Surgeon: Chad Clifton MD;  Location: [facility] OR     INSERT SHUNT PORTAL TRANSJUGULAR INTRAHEPTIC  2022     IR CVC NON TUNNEL LINE REMOVAL  2023     IR CVC NON TUNNEL PLACEMENT > 5 YRS  2023     IR CVC TUNNEL PLACEMENT > 5 YRS OF AGE  2023     IR CVC TUNNEL PLACEMENT > 5 YRS OF AGE  2023     IR PICC PLACEMENT > 5 YRS OF AGE  2023     IR RENAL BIOPSY RIGHT  2023     picc failed attempt Right 2023    PICC failed attempt Right arm unable to thread the catheter in.     RETURN LIVER TRANSPLANT N/A 2023    Procedure: Return liver transplant. Intra-operative ultrasound;  Surgeon: Chad Clifton MD;  Location: [facility] OR     TRANSPLANT KIDNEY RECIPIENT  DONOR N/A 2023    Procedure: Transplant kidney recipient  donor, ureteral stent placement;  Surgeon: Chad Clifton MD;  Location: [facility] OR     TRANSPLANT LIVER RECIPIENT  DONOR N/A 2023    Procedure: Transplant liver recipient  donor;  Surgeon: Chad Clifton MD;  Location: [facility] OR     Social History     Socioeconomic History     Marital status:      Spouse name: Not on file     Number of children: Not on file     Years of education: Not on file     Highest education level: Not on file   Occupational History     Not on file   Tobacco Use     Smoking status: Never     Passive exposure: Never     Smokeless tobacco: Never   Vaping Use     Vaping Use: Never used   Substance and Sexual Activity     Alcohol use: Not Currently     Comment: Last ETOH use was 2021     Drug use: Not  Currently     Sexual activity: Not on file   Other Topics Concern     Parent/sibling w/ CABG, MI or angioplasty before 65F 55M? Not Asked   Social History Narrative     Not on file     Social Determinants of Health     Financial Resource Strain: Not on file   Food Insecurity: Not on file   Transportation Needs: Not on file   Physical Activity: Not on file   Stress: Not on file   Social Connections: Not on file   Interpersonal Safety: Not on file   Housing Stability: Not on file     Prescription Medications as of 1/8/2024       Rx Number Disp Refills Start End Last Dispensed Date Next Fill Date Owning Pharmacy    Alcohol Swabs PADS  100 each 0 7/25/2023 --   [facility] [place], [place] - [address]    Sig: Use to swab the area of the injection or quyen as directed Per insurance coverage    Class: E-Prescribe    allopurinol (ZYLOPRIM) 300 MG tablet  -- -- 11/24/2023 --       Sig: Take 1 tablet by mouth daily    Class: Historical    Route: Oral    anakinra (KINERET) 100 MG/0.67ML SOSY injection  -- -- 11/7/2023 --       Sig: Inject 0.67 mLs (100 mg) Subcutaneous daily as needed    Class: Historical    Route: Subcutaneous    apixaban ANTICOAGULANT (ELIQUIS ANTICOAGULANT) 5 MG tablet  180 tablet 3 8/22/2023 --   [facility] [address] [place] [address] [facility]    Sig: Take 1 tablet (5 mg) by mouth 2 times daily    Class: E-Prescribe    Route: Oral    atorvastatin (LIPITOR) 40 MG tablet  90 tablet 3 11/28/2023 --   [facility] [address] [place] [address] [address] [facility]    Sig: Take 1 tablet (40 mg) by mouth every evening    Class: E-Prescribe    Route: Oral    capsaicin (ZOSTRIX) 0.025 % external cream  50 g 3 11/29/2023 --   [facility] STORE [address] [place] [address] [address] [facility]    Sig: Apply to feet three times per day    Class: E-Prescribe    furosemide (LASIX) 20 MG tablet  30 tablet 0 1/4/2024 --    [facility] #[id number], [place] - [address] AT [facility]    Sig: Take 1 tablet (20 mg) by mouth daily    Class: E-Prescribe    Route: Oral    gabapentin (NEURONTIN) 100 MG capsule  270 capsule 3 11/7/2023 2/5/2024   [facility] #[id number] [address] AT [facility]    Sig: Take 1 capsule (100 mg) by mouth 3 times daily for 90 days    Class: E-Prescribe    Route: Oral    levothyroxine (SYNTHROID/LEVOTHROID) 88 MCG tablet  90 tablet 3 12/28/2023 --   [facility] #[id number] [facility] [address] AT [facility]    Sig: Take 1 tablet (88 mcg) by mouth daily Takes in the middle of the night daily    Class: E-Prescribe    Route: Oral    magnesium oxide (MAG-OX) 400 MG tablet  60 tablet 0 8/16/2023 --   [facility] [address] [address]    Sig: Take 2 tablets (800 mg) by mouth 2 times daily    Class: E-Prescribe    Route: Oral    metoprolol tartrate (LOPRESSOR) 50 MG tablet  180 tablet 3 11/7/2023 2/5/2024   [facility] ST[facility] #[id number] [facility] [address]  AT [facility]    Sig: Take 1 tablet (50 mg) by mouth 2 times daily for 90 days    Class: E-Prescribe    Route: Oral    multivitamin w/minerals (THERA-VIT-M) tablet  30 tablet 0 8/16/2023 --   [facility] [address] [address]    Sig: Take 1 tablet by mouth daily    Class: E-Prescribe    Route: Oral    MYFORTIC (BRAND) 180 MG EC tablet  540 tablet 3 1/3/2024 --   [facility] [address] [address]    Sig: Take 3 tablets (540 mg) by mouth 2 times daily    Class: E-Prescribe    Route: Oral    pantoprazole (PROTONIX) 20 MG EC tablet  90 tablet 3 12/28/2023 --   [facility] #[id number] [facility] [address]  AT [facility] [address]    Sig: Take 1 tablet (20 mg) by mouth every morning (before breakfast)    Class: E-Prescribe    Route: Oral     predniSONE (DELTASONE) 5 MG tablet  90 tablet 3 1/4/2024 --   [facility] #[id number] [place], [place] - [address] AT [facility]    Sig: Take 1 tablet (5 mg) by mouth daily    Class: E-Prescribe    Route: Oral    Sharps Container MISC  1 each 0 7/25/2023 --   [facility] [place], [place] - [address]    Sig: Use as directed to dispose of needles, lancets and other sharps    Class: E-Prescribe    simethicone (MYLICON) 125 MG chewable tablet  -- --  --       Sig: Take 125 mg by mouth 4 times daily as needed for intestinal gas    Class: Historical    Route: Oral    sulfamethoxazole-trimethoprim (BACTRIM) 400-80 MG tablet  90 tablet 3 8/17/2023 --   [facility] [place], [place] - [address]    Sig: Take 1 tablet by mouth daily    Class: E-Prescribe    Route: Oral    tacrolimus (GENERIC) 0.5 MG capsule  180 capsule 3 12/20/2023 --   [facility] [place], [place] - [address]    Sig: Take 1 capsule (0.5 mg) by mouth every 12 hours Total dose: 0.5 mg BID    Class: E-Prescribe    Notes to Pharmacy: TXP DT 6/6/2023 (Liver), 6/6/2023 (Kidney) TXP Dischg DT 6/25/2023 DX Kidney replaced by transplant Z94.0 [facility] ([place], [place])    Route: Oral    tadalafil (CIALIS) 5 MG tablet  20 tablet 5 11/28/2023 --   [facility] #[id number] - [place], [place] - [address] AT [facility]    Sig: Take 1 tablet (5 mg) by mouth daily as needed (take 1-2 tablets at least 30 minutes prior to sexual activity.)    Class: E-Prescribe    Route: Oral    triamcinolone (KENALOG) 0.1 % external ointment  -- -- 11/8/2023 --       Sig: Apply topically 2 times daily    Class: Historical    Route: Topical    vancomycin (VANCOCIN) 125 MG capsule  60 capsule 0 12/20/2023 1/11/2024   [facility] #[id number] - [place], [place] - [address]  AT [facility] OF [address] & [address]    Sig: Take 1 capsule (125  "mg) by mouth 4 times daily for 8 days, THEN 1 capsule (125 mg) 2 times daily for 14 days.    Class: E-Prescribe    Route: Oral    tacrolimus (GENERIC) 1 MG capsule  -- -- 12/20/2023 --       Sig: Take 1 capsule (1 mg) by mouth every 12 hours To have available for dose adjustments per transplant team. Discharge dose = 0.5 mg BID.    Class: No Print Out    Route: Oral        Patient has no known allergies.   REVIEW OF SYSTEMS (check box if normal)  [x]               GENERAL  [x]                 PULMONARY [x]                GENITOURINARY  [x]                CNS                 [x]                 CARDIAC  [x]                 ENDOCRINE  [x]                EARS,NOSE,THROAT [x]                 GASTROINTESTINAL [x]                 NEUROLOGIC    [x]                MUSCLOSKELTAL  [x]                  HEMATOLOGY      PHYSICAL EXAM (check box if normal)BP (!) 132/91   Pulse 99   Temp 98.9  F (37.2  C) (Oral)   Ht 1.676 m (5' 6\")   Wt 96.7 kg (213 lb 1.6 oz)   SpO2 97%   BMI 34.40 kg/m          [x]            GENERAL:    [x]       EYES:  ICTERIC   []        YES  []                    NO  [x]           EXTREMITIES: Clubbing []       Y     [x]           N    [x]           EARS, NOSE, THROAT: Membranes Moist    YES   [x]                   NO []                  [x]           LUNGS:  CLEAR    YES       [x]                  NO    []                                [x]           SKIN: Jaundice           YES       []                  NO    [x]                   Rash: YES       []                  NO    [x]                                     [x]             HEART: Regular Rate          YES       [x]                  NO    []                   Incision Clean:  YES       [x]                  NO    []                                [x]                    ABDOMEN: Organomegaly YES       []                  NO    [x]                       [x]                    NEUROLOGICAL:  Nonfocal  YES       [x]                  NO    []           "             [x]                    Hernia YES       []                  NO    [x]                   PSYCHIATRIC:  Appropriate  YES       [x]                  NO    []                       OTHER:                                                                                                   PAIN SCALE:: 3      Again, thank you for allowing me to participate in the care of your patient.        Sincerely,        Chad Clifton MD

## 2024-01-08 NOTE — NURSING NOTE
"Chief Complaint   Patient presents with    Follow Up     Suture removal      Vital signs:  Temp: 98.9  F (37.2  C) Temp src: Oral BP: (!) 132/91 Pulse: 99     SpO2: 97 %     Height: 167.6 cm (5' 6\") Weight: 96.7 kg (213 lb 1.6 oz)  Estimated body mass index is 34.4 kg/m  as calculated from the following:    Height as of this encounter: 1.676 m (5' 6\").    Weight as of this encounter: 96.7 kg (213 lb 1.6 oz).      Gurwinder Muñoz RN on 1/8/2024 at 9:51 AM    "

## 2024-01-10 ENCOUNTER — INFUSION THERAPY VISIT (OUTPATIENT)
Dept: INFUSION THERAPY | Facility: HOSPITAL | Age: 69
End: 2024-01-10
Attending: INTERNAL MEDICINE
Payer: COMMERCIAL

## 2024-01-10 VITALS
OXYGEN SATURATION: 93 % | DIASTOLIC BLOOD PRESSURE: 71 MMHG | SYSTOLIC BLOOD PRESSURE: 117 MMHG | RESPIRATION RATE: 16 BRPM | HEART RATE: 77 BPM | TEMPERATURE: 98.4 F

## 2024-01-10 DIAGNOSIS — Z94.4 LIVER REPLACED BY TRANSPLANT (H): ICD-10-CM

## 2024-01-10 DIAGNOSIS — D63.1 ANEMIA IN CHRONIC KIDNEY DISEASE, UNSPECIFIED CKD STAGE: ICD-10-CM

## 2024-01-10 DIAGNOSIS — Z94.0 KIDNEY REPLACED BY TRANSPLANT: Primary | ICD-10-CM

## 2024-01-10 DIAGNOSIS — N18.9 ANEMIA IN CHRONIC KIDNEY DISEASE, UNSPECIFIED CKD STAGE: ICD-10-CM

## 2024-01-10 PROCEDURE — 96365 THER/PROPH/DIAG IV INF INIT: CPT

## 2024-01-10 PROCEDURE — 258N000003 HC RX IP 258 OP 636: Performed by: INTERNAL MEDICINE

## 2024-01-10 PROCEDURE — 250N000011 HC RX IP 250 OP 636: Mod: JZ | Performed by: INTERNAL MEDICINE

## 2024-01-10 RX ORDER — EPINEPHRINE 1 MG/ML
0.3 INJECTION, SOLUTION INTRAMUSCULAR; SUBCUTANEOUS EVERY 5 MIN PRN
Status: CANCELLED | OUTPATIENT
Start: 2024-01-11

## 2024-01-10 RX ORDER — ALBUTEROL SULFATE 0.83 MG/ML
2.5 SOLUTION RESPIRATORY (INHALATION)
Status: DISCONTINUED | OUTPATIENT
Start: 2024-01-10 | End: 2024-01-10 | Stop reason: HOSPADM

## 2024-01-10 RX ORDER — EPINEPHRINE 1 MG/ML
0.3 INJECTION, SOLUTION INTRAMUSCULAR; SUBCUTANEOUS EVERY 5 MIN PRN
Status: DISCONTINUED | OUTPATIENT
Start: 2024-01-10 | End: 2024-01-10 | Stop reason: HOSPADM

## 2024-01-10 RX ORDER — ALBUTEROL SULFATE 0.83 MG/ML
2.5 SOLUTION RESPIRATORY (INHALATION)
Status: CANCELLED | OUTPATIENT
Start: 2024-01-11

## 2024-01-10 RX ORDER — HEPARIN SODIUM (PORCINE) LOCK FLUSH IV SOLN 100 UNIT/ML 100 UNIT/ML
5 SOLUTION INTRAVENOUS
Status: CANCELLED | OUTPATIENT
Start: 2024-01-11

## 2024-01-10 RX ORDER — HEPARIN SODIUM,PORCINE 10 UNIT/ML
5-20 VIAL (ML) INTRAVENOUS DAILY PRN
Status: CANCELLED | OUTPATIENT
Start: 2024-01-11

## 2024-01-10 RX ORDER — ALBUTEROL SULFATE 90 UG/1
1-2 AEROSOL, METERED RESPIRATORY (INHALATION)
Status: CANCELLED
Start: 2024-01-11

## 2024-01-10 RX ORDER — MEPERIDINE HYDROCHLORIDE 50 MG/ML
25 INJECTION INTRAMUSCULAR; INTRAVENOUS; SUBCUTANEOUS EVERY 30 MIN PRN
Status: CANCELLED | OUTPATIENT
Start: 2024-01-11

## 2024-01-10 RX ORDER — ALBUTEROL SULFATE 90 UG/1
1-2 AEROSOL, METERED RESPIRATORY (INHALATION)
Status: DISCONTINUED | OUTPATIENT
Start: 2024-01-10 | End: 2024-01-10 | Stop reason: HOSPADM

## 2024-01-10 RX ORDER — METHYLPREDNISOLONE SODIUM SUCCINATE 125 MG/2ML
125 INJECTION, POWDER, LYOPHILIZED, FOR SOLUTION INTRAMUSCULAR; INTRAVENOUS
Status: DISCONTINUED | OUTPATIENT
Start: 2024-01-10 | End: 2024-01-10 | Stop reason: HOSPADM

## 2024-01-10 RX ORDER — DIPHENHYDRAMINE HYDROCHLORIDE 50 MG/ML
50 INJECTION INTRAMUSCULAR; INTRAVENOUS
Status: CANCELLED
Start: 2024-01-11

## 2024-01-10 RX ORDER — METHYLPREDNISOLONE SODIUM SUCCINATE 125 MG/2ML
125 INJECTION, POWDER, LYOPHILIZED, FOR SOLUTION INTRAMUSCULAR; INTRAVENOUS
Status: CANCELLED
Start: 2024-01-11

## 2024-01-10 RX ORDER — DIPHENHYDRAMINE HYDROCHLORIDE 50 MG/ML
50 INJECTION INTRAMUSCULAR; INTRAVENOUS
Status: DISCONTINUED | OUTPATIENT
Start: 2024-01-10 | End: 2024-01-10 | Stop reason: HOSPADM

## 2024-01-10 RX ORDER — MEPERIDINE HYDROCHLORIDE 50 MG/ML
25 INJECTION INTRAMUSCULAR; INTRAVENOUS; SUBCUTANEOUS EVERY 30 MIN PRN
Status: DISCONTINUED | OUTPATIENT
Start: 2024-01-10 | End: 2024-01-10 | Stop reason: HOSPADM

## 2024-01-10 RX ADMIN — FERRIC CARBOXYMALTOSE INJECTION 750 MG: 50 INJECTION, SOLUTION INTRAVENOUS at 13:50

## 2024-01-10 RX ADMIN — SODIUM CHLORIDE 250 ML: 9 INJECTION, SOLUTION INTRAVENOUS at 14:07

## 2024-01-10 NOTE — PROGRESS NOTES
Infusion Nursing Note:  Damion Quinones presents today for iron .    Patient seen by provider today: No   present during visit today: Not Applicable.    Note: Unable to start iv. PICC started iv in right forearm..      Intravenous Access:  Peripheral IV placed.    Treatment Conditions:  Not Applicable.      Post Infusion Assessment:  Patient tolerated infusion without incident.       Discharge Plan:   Patient and/or family verbalized understanding of discharge instructions and all questions answered.      Nely Jones RN

## 2024-01-11 ENCOUNTER — HOSPITAL ENCOUNTER (OUTPATIENT)
Dept: ULTRASOUND IMAGING | Facility: CLINIC | Age: 69
Discharge: HOME | End: 2024-01-11
Attending: INTERNAL MEDICINE | Admitting: INTERNAL MEDICINE
Payer: COMMERCIAL

## 2024-01-11 DIAGNOSIS — Z94.4 LIVER TRANSPLANT RECIPIENT (H): Primary | ICD-10-CM

## 2024-01-11 DIAGNOSIS — Z94.4 LIVER TRANSPLANT RECIPIENT (H): ICD-10-CM

## 2024-01-11 DIAGNOSIS — Z94.4 TRANSPLANTED LIVER (H): Primary | ICD-10-CM

## 2024-01-11 LAB
ALP BONE SERPL-CCNC: 64 U/L
ALP LIVER SERPL-CCNC: 216 U/L
ALP OTHER SERPL-CCNC: 0 U/L
ALP SERPL-CCNC: 280 U/L

## 2024-01-11 PROCEDURE — 93975 VASCULAR STUDY: CPT

## 2024-01-11 PROCEDURE — 93975 VASCULAR STUDY: CPT | Mod: 26 | Performed by: STUDENT IN AN ORGANIZED HEALTH CARE EDUCATION/TRAINING PROGRAM

## 2024-01-12 ENCOUNTER — TELEPHONE (OUTPATIENT)
Dept: TRANSPLANT | Facility: CLINIC | Age: 69
End: 2024-01-12
Payer: MEDICARE

## 2024-01-12 NOTE — TELEPHONE ENCOUNTER
Casey and Nabila called regarding US order. Explained that yesterday's US was inconclusive in regard to flows so another is needed to see if a better doppler can be done. They will call to schedule.     Nabila asked about whether or not Casey is diabetic. They got a call that his PCP wanted him to have a diabetic eye exam. He had an eye exam a couple months ago and everything was fine. Informed them that they will need to talk to PCP about this.     They also asked when Casey's gallbladder was removed. Explained that it was taken out when he had his transplant and that the donor gallbladder was not transplanted.

## 2024-01-15 ASSESSMENT — ENCOUNTER SYMPTOMS
NAUSEA: 1
VOMITING: 1

## 2024-01-16 ENCOUNTER — LAB (OUTPATIENT)
Dept: LAB | Facility: CLINIC | Age: 69
End: 2024-01-16
Payer: COMMERCIAL

## 2024-01-16 DIAGNOSIS — Z94.4 LIVER TRANSPLANT RECIPIENT (H): ICD-10-CM

## 2024-01-16 LAB
ALBUMIN SERPL BCG-MCNC: 3.7 G/DL (ref 3.5–5.2)
ALP SERPL-CCNC: 232 U/L (ref 40–150)
ALT SERPL W P-5'-P-CCNC: 32 U/L (ref 0–70)
ANION GAP SERPL CALCULATED.3IONS-SCNC: 12 MMOL/L (ref 7–15)
AST SERPL W P-5'-P-CCNC: 28 U/L (ref 0–45)
BILIRUB DIRECT SERPL-MCNC: <0.2 MG/DL (ref 0–0.3)
BILIRUB SERPL-MCNC: 0.4 MG/DL
BUN SERPL-MCNC: 22.2 MG/DL (ref 8–23)
CALCIUM SERPL-MCNC: 9.6 MG/DL (ref 8.8–10.2)
CHLORIDE SERPL-SCNC: 101 MMOL/L (ref 98–107)
CREAT SERPL-MCNC: 1.25 MG/DL (ref 0.67–1.17)
DEPRECATED HCO3 PLAS-SCNC: 24 MMOL/L (ref 22–29)
EGFRCR SERPLBLD CKD-EPI 2021: 64 ML/MIN/1.73M2
ERYTHROCYTE [DISTWIDTH] IN BLOOD BY AUTOMATED COUNT: 17.3 % (ref 10–15)
GLUCOSE SERPL-MCNC: 106 MG/DL (ref 70–99)
HCT VFR BLD AUTO: 39.1 % (ref 40–53)
HGB BLD-MCNC: 11.9 G/DL (ref 13.3–17.7)
MAGNESIUM SERPL-MCNC: 1.5 MG/DL (ref 1.7–2.3)
MCH RBC QN AUTO: 27.9 PG (ref 26.5–33)
MCHC RBC AUTO-ENTMCNC: 30.4 G/DL (ref 31.5–36.5)
MCV RBC AUTO: 92 FL (ref 78–100)
PHOSPHATE SERPL-MCNC: 3 MG/DL (ref 2.5–4.5)
PLATELET # BLD AUTO: 217 10E3/UL (ref 150–450)
POTASSIUM SERPL-SCNC: 4.2 MMOL/L (ref 3.4–5.3)
PROT SERPL-MCNC: 6.3 G/DL (ref 6.4–8.3)
RBC # BLD AUTO: 4.26 10E6/UL (ref 4.4–5.9)
SODIUM SERPL-SCNC: 137 MMOL/L (ref 135–145)
TACROLIMUS BLD-MCNC: 6.8 UG/L (ref 5–15)
TME LAST DOSE: NORMAL H
TME LAST DOSE: NORMAL H
WBC # BLD AUTO: 5.7 10E3/UL (ref 4–11)

## 2024-01-16 PROCEDURE — 82248 BILIRUBIN DIRECT: CPT

## 2024-01-16 PROCEDURE — 84100 ASSAY OF PHOSPHORUS: CPT

## 2024-01-16 PROCEDURE — 80053 COMPREHEN METABOLIC PANEL: CPT

## 2024-01-16 PROCEDURE — 80197 ASSAY OF TACROLIMUS: CPT

## 2024-01-16 PROCEDURE — 85027 COMPLETE CBC AUTOMATED: CPT

## 2024-01-16 PROCEDURE — 36415 COLL VENOUS BLD VENIPUNCTURE: CPT

## 2024-01-16 PROCEDURE — 83735 ASSAY OF MAGNESIUM: CPT

## 2024-01-18 ENCOUNTER — HOSPITAL ENCOUNTER (OUTPATIENT)
Dept: ULTRASOUND IMAGING | Facility: CLINIC | Age: 69
Discharge: HOME | End: 2024-01-18
Attending: INTERNAL MEDICINE | Admitting: INTERNAL MEDICINE
Payer: COMMERCIAL

## 2024-01-18 DIAGNOSIS — Z94.4 TRANSPLANTED LIVER (H): ICD-10-CM

## 2024-01-18 PROCEDURE — 93975 VASCULAR STUDY: CPT

## 2024-01-18 PROCEDURE — 93975 VASCULAR STUDY: CPT | Mod: 26 | Performed by: STUDENT IN AN ORGANIZED HEALTH CARE EDUCATION/TRAINING PROGRAM

## 2024-01-22 ENCOUNTER — TELEPHONE (OUTPATIENT)
Dept: PHARMACY | Facility: CLINIC | Age: 69
End: 2024-01-22
Payer: MEDICARE

## 2024-01-22 NOTE — TELEPHONE ENCOUNTER
Clinical Pharmacy Consult:                                                      Transplant Specific: 6 month post transplant pharmacy review  Date of Transplant: 06/06/2023  Type of Transplant: kidney and liver  First Transplant: yes  History of rejection: no    Immunosuppression Regimen   TAC 0.5 mg qAM & 0.5 mg qPM, Prednisone 5 mg daily, and Myforitic 540 mg qAM & 540 mg qPM  Patient specific goal: 6-8  Most recent level: 6.8 on 1/16/24  Immunosuppressant Levels:  Therapeutic  Pt adherent to lab draws: yes  Scr:   Lab Results   Component Value Date    CR 1.08 07/26/2023    CR 1.11 08/27/2019     Side effects: none    Prophylactic Medications  Antibacterial:  Bactrim 400-800 daily  Scheduled Discontinue Date: Lifelong    Antifungal: Nystatin  Scheduled Discontinue Date: Therapy Complete    Antiviral: Max dose in Liver transplant is Valcyte 450mg once daily   Scheduled Discontinue Date: 3 months - completed    Acid Reducer: Protonix (pantoprazole)  Scheduled Reviewed Date:  MD to evaluate    Thrombosis Prevention: Apixiban 5 mg twice daily  Scheduled Discontinue Date:  MD to evaluate    Blood Pressure Management  Frequency of home Blood Pressure checks:  few times a week  Most recent home BP:  120's/70's  Patient Blood pressure goal: <140/90  Patient blood pressure at goal:  yes  Hospitalizations/ER visits since last assessment:  1, pain, N/V    Med rec/DUR performed: yes  Med Rec Discrepancies: no    Spoke with Apoorva today via phone. Casey is doing better, recovered from his most recent bout in hospital. She is still managing all his medications and reported no missed dose. Denied any side effects, pain or fever since discharged from hospital. He was started on furosemide. They monitor his weight and adjust furosemide as needed. He was restarted on sulfa and ursodiol.    BP: was a bit high in clinic but expect and still within patient's goal.  Home BP are at goal.    No other questions or concerns. Follow up in  6  months.    Flakito Roman, Pharm D  [facility]

## 2024-01-24 ENCOUNTER — PATIENT OUTREACH (OUTPATIENT)
Dept: CARE COORDINATION | Facility: CLINIC | Age: 69
End: 2024-01-24
Payer: MEDICARE

## 2024-01-24 NOTE — PROGRESS NOTES
Anemia Management Note  SUBJECTIVE/OBJECTIVE:  Referred by Dr. Subhash Dutta on 2023  Primary Diagnosis: Anemia of Other Chronic Illness (D63.8)     Secondary Diagnosis:  Organ or tissue replaced by transplant, kidney (Z94.0)  Kidney Tx: 2023  Hgb goal range:  9-10  Epo/Darbo: Aranesp  40 mcg  every two weeks for Hgb <10.0. In clinic  *If Aranesp is ordered dose at 0.45mcg/kg  Iron regimen:  NA  Injectafer completed [id number]     Labs : 2024  Recent FEDERICO use, transfusion, IV iron: Mircera   RX/TX plans : 2024     *[facility]; [phone]      Hx of DVT (2023), Afib, anticoagulate.      Consent to Communicate:  OK to speak with Nabila Quinones (wife) per consent to communicate dated 10/27/2022. No Boxes Checked on Consent to Communicate.         Latest Ref Rng & Units 2023     6:15 AM 2023    11:54 AM 2023     1:38 PM 2024     7:53 AM 2024     9:06 AM 2024     2:44 PM 2024     8:14 AM   Anemia   Hemoglobin 13.3 - 17.7 g/dL 11.1  10.6   10.4  11.3   11.9    IV Iron Dose       750mg    Ferritin 31 - 409 ng/mL   1,832          BP Readings from Last 3 Encounters:   01/10/24 117/71   24 (!) 132/91   23 133/80     Wt Readings from Last 2 Encounters:   24 96.7 kg (213 lb 1.6 oz)   23 100.7 kg (222 lb)           ASSESSMENT:  Hgb:at goal - continue to monitor  TSat: Due for iron studies 4 weeks after last IV iron infusion Ferritin: Due for iron studies 4 weeks after last IV iron infusion    PLAN:  Check iron studies in Mid 2024    Orders needed to be renewed (for next follow-up date) in EPIC: None    Iron labs due:  Mid 2024    Plan discussed with:  No call, chart review       NEXT FOLLOW-UP DATE:  2/10/24    Molly Fenton RN   Anemia Services  [facility]  [email or web address]   Office : [phone]  Fax: [phone]

## 2024-01-31 ENCOUNTER — OFFICE VISIT (OUTPATIENT)
Dept: NEUROLOGY | Facility: CLINIC | Age: 69
End: 2024-01-31
Attending: PSYCHIATRY & NEUROLOGY
Payer: COMMERCIAL

## 2024-01-31 DIAGNOSIS — G60.3 IDIOPATHIC PROGRESSIVE POLYNEUROPATHY: Primary | ICD-10-CM

## 2024-01-31 DIAGNOSIS — M47.27 OSTEOARTHRITIS OF SPINE WITH RADICULOPATHY, LUMBOSACRAL REGION: ICD-10-CM

## 2024-01-31 DIAGNOSIS — Z94.0 KIDNEY REPLACED BY TRANSPLANT: ICD-10-CM

## 2024-01-31 PROCEDURE — 95911 NRV CNDJ TEST 9-10 STUDIES: CPT | Performed by: PSYCHIATRY & NEUROLOGY

## 2024-01-31 PROCEDURE — 95886 MUSC TEST DONE W/N TEST COMP: CPT | Performed by: PSYCHIATRY & NEUROLOGY

## 2024-01-31 PROCEDURE — 95885 MUSC TST DONE W/NERV TST LIM: CPT | Mod: 59 | Performed by: PSYCHIATRY & NEUROLOGY

## 2024-01-31 NOTE — PROGRESS NOTES
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  This study is being performed to investigate for radiculopathy vs focal neuropathy vs polyneuropathy.     Techniques:  Motor and sensory conduction studies were done with surface recording electrodes. EMG was done with a concentric needle electrode.     Results:  Nerve conduction studies:   1. Bilateral sural and left superficial peroneal sensory responses are normal.   2. Left ulnar-D5 and radial sensory responses are normal.   3. Right peroneal-EDB motor response shows normal DL, severely reduced amplitude and CV slowing across the knee. Peroneal-TA also shows CV slowing across the knee.   4. Left peroneal-EDB and bilateral tibial-AH motor responses show normal DL, reduced amplitudes and normal CV.  5. Left ulnar-ADM motor response is normal.      Needle EM. Fibrillation potentials and positive sharp waves were seen in the bilateral gastrocnemius, left PL and left glut max muscles. CRD were also seen in the left LS paraspinal muscles.   2. Large amplitude and/or long duration motor unit potentials (MUP) were seen in the bilateral gastrocnemius, right TA, left PL and left glut max muscles.  3. Recruitment patterns were normal.      Interpretation:  This is an abnormal study. There is electrophysiologic evidence of:  1) Moderate axonal, length-dependant sensorimotor polyneuropathy  2) Left-sided active on chronic S1 radiculopathy  3) Probable right-sided chronic S1 radiculopathy  4) Right-sided peroneal neuropathy across the knee     Clinical correlation is recommended.     Rafael Delgado MD  Department of Neurology        Nerve Conduction Studies    Motor Sites      Latency Amplitude Neg. Amp Diff Segment Distance Velocity Neg. Dur Neg Area Diff Temperature Comment   Site (ms) Norm (mV) Norm (%)  cm m/s Norm (ms) (%) ( C)    Right Dp Branch Fibular (TA) Motor   Fib Head 2.5  < 6.0 1.96 -      6.9  25    Pop Fossa 4.6  < 5.7 2.7 - 38 Pop Fossa-Fib Head 6 29 - 7.9 53 24.9    Left Fibular (EDB) Motor   Ankle 3.0  < 6.0 1.04  > 2.0  Ankle-EDB 8   6.3  33.1    Bel Fib Head 11.8 - 0.71 - -32 Bel Fib Head-Ankle 33 38  > 38 6.8 -28 33.1    Pop Fossa 14.2 - 0.65 - -8 Pop Fossa-Bel Fib Head 9 38  > 38 6.7 -15 33.1    Right Fibular (EDB) Motor   Ankle 4.1  < 6.0 0.27  > 2.0  Ankle-EDB 8   -      Bel Fib Head 12.0 - 0.07 - -74 Bel Fib Head-Ankle 30 38  > 38 9.2 - 32.9    Pop Fossa 15.1 - 0.02 - -71 Pop Fossa-Bel Fib Head 10 32  > 38 11.6 -63 32.8    Left Tibial (AHB) Motor   Ankle 4.6  < 6.5 2.1  > 5.0  Ankle-AHB 8   5.2  33    Knee 14.1 - 1.46 - -30 Knee-Ankle 36 38  > 38 5.2 -24 33    Right Tibial (AHB) Motor   Ankle 5.6  < 6.5 2.1  > 5.0  Ankle-AHB 8   4.7  25    Left Ulnar (ADM) Motor   Wrist 2.9  < 3.5 8.5  > 5.0  Wrist-ADM 8   4.4  32.9    Bel Elbow 7.4 - 7.9 - -7 Bel Elbow-Wrist 24.5 54  > 48 4.4 -8 33    Abv Elbow 9.1 - 7.9 - 0 Abv Elbow-Bel Elbow 8.5 50  > 48 5.1 1 33      Sensory Sites      Onset Lat Peak Lat Amp (O-P) Amp (P-P) Segment Distance Velocity Temperature Comment   Site ms (ms)  V Norm ( V)  cm m/s Norm ( C)    Left Radial Sensory   Forearm-Wrist 1.95 2.6 15  > 15 17 Forearm-Wrist 10 51 - 32.9    Left Superficial Fibular Sensory    14 cm-Ankle NR NR NR  > 3 NR 14 cm-Ankle 12.5 NR  > 38 31    Left Sural Sensory   Calf-Lat Mall NR NR NR  > 5 NR Calf-Lat Mall 14 NR  > 38 33.2    Right Sural Sensory   Calf-Lat Mall NR NR NR  > 5 NR Calf-Lat Mall 14 NR  > 38 32.6    Left Ulnar Sensory   Wrist-Dig V 2.3 3.2 13  > 8 39 Wrist-Dig V 12.5 54  > 48 33      F Wave Studies     Min-F Max-F Dispersion Persistence Mean-F F-Norm L-R Mean-F L-R Mean-F Norm F/M Ratio F-M Lat (ms)   Left Tibial (Abd Hallucis)  33  C   50.00 60.00 10.00 66.67 55.39 <61  <5.7     Left Ulnar (Abd Dig Min)  33  C   25.00 30.70 5.70 70.00 26.50 <36  <2.5 9.95 22.58       Electromyography     Side Muscle Ins Act Fibs/PSW Fasc HF Amp Dur Poly Recrt Int Pat   Left Vastus Lat Nml None Nml 0 Nml Nml 0 Nml Nml   Left Tib Anterior Nml None Nml 0 Nml Nml 0 Nml Nml   Left Gastroc Incr 2+ Nml 0 2+ Nml 1+ Nml Nml   Left Fib Longus Incr 1+ Nml 0 1+ Nml 1+ Nml Nml   Left Gluteus Max Incr 1+ Nml 0 Nml 1+ 1+ Nml Nml   Left Lumbo Parasp (Lower) Incr None Nml CRD        Right Vastus Lat Nml None Nml 0 Nml Nml 0 Nml Nml   Right Tib Anterior Nml None Nml 0 Nml 2+ 2+ Nml Nml   Right Gastroc Incr 1+ Nml 0 Nml 1+ 1+ Nml Nml         NCS Waveforms:    Motor                           Sensory                         Ultrasound Images:

## 2024-01-31 NOTE — LETTER
2024       RE: Damion Quinones   [address] [facility] [address]     Dear Colleague,    Thank you for referring your patient, Damion Quinones, to the [facility] EMG CLINIC [place] at [facility]. Please see a copy of my visit note below.                            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  This study is being performed to investigate for radiculopathy vs focal neuropathy vs polyneuropathy.     Techniques:  Motor and sensory conduction studies were done with surface recording electrodes. EMG was done with a concentric needle electrode.     Results:  Nerve conduction studies:   1. Bilateral sural and left superficial peroneal sensory responses are normal.   2. Left ulnar-D5 and radial sensory responses are normal.   3. Right peroneal-EDB motor response shows normal DL, severely reduced amplitude and CV slowing across the knee. Peroneal-TA also shows CV slowing across the knee.   4. Left peroneal-EDB and bilateral tibial-AH motor responses show normal DL, reduced amplitudes and normal CV.  5. Left ulnar-ADM motor response is normal.      Needle EM. Fibrillation potentials and positive sharp waves were seen in the bilateral gastrocnemius,  left PL and left glut max muscles. CRD were also seen in the left LS paraspinal muscles.   2. Large amplitude and/or long duration motor unit potentials (MUP) were seen in the bilateral gastrocnemius, right TA, left PL and left glut max muscles.  3. Recruitment patterns were normal.     Interpretation:  This is an abnormal study. There is electrophysiologic evidence of:  1) Moderate axonal, length-dependant sensorimotor polyneuropathy  2) Left-sided active on chronic S1 radiculopathy  3) Probable right-sided chronic S1 radiculopathy  4) Right-sided peroneal neuropathy across the knee     Clinical correlation is recommended.     Rafael Delgado MD  Department of Neurology        Nerve Conduction Studies    Motor Sites      Latency Amplitude Neg. Amp Diff Segment Distance Velocity Neg. Dur Neg Area Diff Temperature Comment   Site (ms) Norm (mV) Norm (%)  cm m/s Norm (ms) (%) ( C)    Right Dp Branch Fibular (TA) Motor   Fib Head 2.5  < 6.0 1.96 -      6.9  25    Pop Fossa 4.6  < 5.7 2.7 - 38 Pop Fossa-Fib Head 6 29 - 7.9 53 24.9    Left Fibular (EDB) Motor   Ankle 3.0  < 6.0 1.04  > 2.0  Ankle-EDB 8   6.3  33.1    Bel Fib Head 11.8 - 0.71 - -32 Bel Fib Head-Ankle 33 38  > 38 6.8 -28 33.1    Pop Fossa 14.2 - 0.65 - -8 Pop Fossa-Bel Fib Head 9 38  > 38 6.7 -15 33.1    Right Fibular (EDB) Motor   Ankle 4.1  < 6.0 0.27  > 2.0  Ankle-EDB 8   -      Bel Fib Head 12.0 - 0.07 - -74 Bel Fib Head-Ankle 30 38  > 38 9.2 - 32.9    Pop Fossa 15.1 - 0.02 - -71 Pop Fossa-Bel Fib Head 10 32  > 38 11.6 -63 32.8    Left Tibial (AHB) Motor   Ankle 4.6  < 6.5 2.1  > 5.0  Ankle-AHB 8   5.2  33    Knee 14.1 - 1.46 - -30 Knee-Ankle 36 38  > 38 5.2 -24 33    Right Tibial (AHB) Motor   Ankle 5.6  < 6.5 2.1  > 5.0  Ankle-AHB 8   4.7  25    Left Ulnar (ADM) Motor   Wrist 2.9  < 3.5 8.5  > 5.0  Wrist-ADM 8   4.4  32.9    Bel Elbow 7.4 - 7.9 - -7 Bel Elbow-Wrist 24.5 54  > 48 4.4 -8 33    Abv Elbow 9.1 - 7.9 - 0 Abv Elbow-Bel Elbow 8.5 50  > 48 5.1 1  33      Sensory Sites      Onset Lat Peak Lat Amp (O-P) Amp (P-P) Segment Distance Velocity Temperature Comment   Site ms (ms)  V Norm ( V)  cm m/s Norm ( C)    Left Radial Sensory   Forearm-Wrist 1.95 2.6 15  > 15 17 Forearm-Wrist 10 51 - 32.9    Left Superficial Fibular Sensory   14 cm-Ankle NR NR NR  > 3 NR 14 cm-Ankle 12.5 NR  > 38 31    Left Sural Sensory   Calf-Lat Mall NR NR NR  > 5 NR Calf-Lat Mall 14 NR  > 38 33.2    Right Sural Sensory   Calf-Lat Mall NR NR NR  > 5 NR Calf-Lat Mall 14 NR  > 38 32.6    Left Ulnar Sensory   Wrist-Dig V 2.3 3.2 13  > 8 39 Wrist-Dig V 12.5 54  > 48 33      F Wave Studies     Min-F Max-F Dispersion Persistence Mean-F F-Norm L-R Mean-F L-R Mean-F Norm F/M Ratio F-M Lat (ms)   Left Tibial (Abd Hallucis)  33  C   50.00 60.00 10.00 66.67 55.39 <61  <5.7     Left Ulnar (Abd Dig Min)  33  C   25.00 30.70 5.70 70.00 26.50 <36  <2.5 9.95 22.58       Electromyography     Side Muscle Ins Act Fibs/PSW Fasc HF Amp Dur Poly Recrt Int Pat   Left Vastus Lat Nml None Nml 0 Nml Nml 0 Nml Nml   Left Tib Anterior Nml None Nml 0 Nml Nml 0 Nml Nml   Left Gastroc Incr 2+ Nml 0 2+ Nml 1+ Nml Nml   Left Fib Longus Incr 1+ Nml 0 1+ Nml 1+ Nml Nml   Left Gluteus Max Incr 1+ Nml 0 Nml 1+ 1+ Nml Nml   Left Lumbo Parasp (Lower) Incr None Nml CRD        Right Vastus Lat Nml None Nml 0 Nml Nml 0 Nml Nml   Right Tib Anterior Nml None Nml 0 Nml 2+ 2+ Nml Nml   Right Gastroc Incr 1+ Nml 0 Nml 1+ 1+ Nml Nml         NCS Waveforms:    Motor                           Sensory                         Ultrasound Images:        Again, thank you for allowing me to participate in the care of your patient.      Sincerely,    Rafael Delgado MD     never

## 2024-02-01 DIAGNOSIS — Z94.4 LIVER TRANSPLANT RECIPIENT (H): Primary | ICD-10-CM

## 2024-02-01 DIAGNOSIS — M1A.9XX1 TOPHACEOUS GOUT: ICD-10-CM

## 2024-02-02 ENCOUNTER — LAB (OUTPATIENT)
Dept: LAB | Facility: CLINIC | Age: 69
End: 2024-02-02
Payer: COMMERCIAL

## 2024-02-02 ENCOUNTER — OFFICE VISIT (OUTPATIENT)
Dept: DERMATOLOGY | Facility: CLINIC | Age: 69
End: 2024-02-02
Payer: COMMERCIAL

## 2024-02-02 DIAGNOSIS — Z79.899 ENCOUNTER FOR LONG-TERM CURRENT USE OF MEDICATION: ICD-10-CM

## 2024-02-02 DIAGNOSIS — L81.4 SOLAR LENTIGO: ICD-10-CM

## 2024-02-02 DIAGNOSIS — L85.3 XEROSIS CUTIS: ICD-10-CM

## 2024-02-02 DIAGNOSIS — Z94.4 LIVER TRANSPLANT RECIPIENT (H): ICD-10-CM

## 2024-02-02 DIAGNOSIS — L73.9 FOLLICULITIS: ICD-10-CM

## 2024-02-02 DIAGNOSIS — M1A.9XX1 TOPHACEOUS GOUT: ICD-10-CM

## 2024-02-02 DIAGNOSIS — Z94.0 KIDNEY REPLACED BY TRANSPLANT: ICD-10-CM

## 2024-02-02 DIAGNOSIS — Z48.298 AFTERCARE FOLLOWING ORGAN TRANSPLANT: ICD-10-CM

## 2024-02-02 DIAGNOSIS — D63.1 ANEMIA IN STAGE 2 CHRONIC KIDNEY DISEASE: ICD-10-CM

## 2024-02-02 DIAGNOSIS — Z80.8 FAMILY HX OF MELANOMA: ICD-10-CM

## 2024-02-02 DIAGNOSIS — Z94.4 LIVER REPLACED BY TRANSPLANT (H): ICD-10-CM

## 2024-02-02 DIAGNOSIS — L82.0 INFLAMED SEBORRHEIC KERATOSIS: ICD-10-CM

## 2024-02-02 DIAGNOSIS — L30.9 DERMATITIS: Primary | ICD-10-CM

## 2024-02-02 DIAGNOSIS — N18.2 ANEMIA IN STAGE 2 CHRONIC KIDNEY DISEASE: ICD-10-CM

## 2024-02-02 DIAGNOSIS — B35.3 TINEA PEDIS OF BOTH FEET: ICD-10-CM

## 2024-02-02 DIAGNOSIS — L57.8 PHOTOAGING OF SKIN: ICD-10-CM

## 2024-02-02 DIAGNOSIS — L82.1 SEBORRHEIC KERATOSES: ICD-10-CM

## 2024-02-02 LAB
ALBUMIN MFR UR ELPH: 17.7 MG/DL
ALBUMIN SERPL BCG-MCNC: 3.8 G/DL (ref 3.5–5.2)
ALBUMIN UR-MCNC: NEGATIVE MG/DL
ALP SERPL-CCNC: 155 U/L (ref 40–150)
ALT SERPL W P-5'-P-CCNC: 14 U/L (ref 0–70)
ANION GAP SERPL CALCULATED.3IONS-SCNC: 9 MMOL/L (ref 7–15)
APPEARANCE UR: CLEAR
AST SERPL W P-5'-P-CCNC: 19 U/L (ref 0–45)
BASOPHILS # BLD AUTO: 0 10E3/UL (ref 0–0.2)
BASOPHILS NFR BLD AUTO: 1 %
BILIRUB DIRECT SERPL-MCNC: 0.22 MG/DL (ref 0–0.3)
BILIRUB SERPL-MCNC: 0.5 MG/DL
BILIRUB UR QL STRIP: NEGATIVE
BK VIRUS SPECIMEN TYPE: NORMAL
BKV DNA # SPEC NAA+PROBE: NOT DETECTED IU/ML
BUN SERPL-MCNC: 11.8 MG/DL (ref 8–23)
CALCIUM SERPL-MCNC: 9.3 MG/DL (ref 8.8–10.2)
CHLORIDE SERPL-SCNC: 102 MMOL/L (ref 98–107)
COLOR UR AUTO: NORMAL
CREAT SERPL-MCNC: 0.98 MG/DL (ref 0.67–1.17)
CREAT UR-MCNC: 103 MG/DL
DEPRECATED HCO3 PLAS-SCNC: 27 MMOL/L (ref 22–29)
EGFRCR SERPLBLD CKD-EPI 2021: 85 ML/MIN/1.73M2
EOSINOPHIL # BLD AUTO: 0.1 10E3/UL (ref 0–0.7)
EOSINOPHIL NFR BLD AUTO: 4 %
ERYTHROCYTE [DISTWIDTH] IN BLOOD BY AUTOMATED COUNT: 18.1 % (ref 10–15)
FERRITIN SERPL-MCNC: 807 NG/ML (ref 31–409)
GLUCOSE SERPL-MCNC: 101 MG/DL (ref 70–99)
GLUCOSE UR STRIP-MCNC: NEGATIVE MG/DL
HCT VFR BLD AUTO: 39.9 % (ref 40–53)
HGB BLD-MCNC: 12.1 G/DL (ref 13.3–17.7)
HGB UR QL STRIP: NEGATIVE
IMM GRANULOCYTES # BLD: 0.1 10E3/UL
IMM GRANULOCYTES NFR BLD: 1 %
IRON BINDING CAPACITY (ROCHE): 211 UG/DL (ref 240–430)
IRON SATN MFR SERPL: 24 % (ref 15–46)
IRON SERPL-MCNC: 50 UG/DL (ref 61–157)
KETONES UR STRIP-MCNC: NEGATIVE MG/DL
LEUKOCYTE ESTERASE UR QL STRIP: NEGATIVE
LYMPHOCYTES # BLD AUTO: 0.7 10E3/UL (ref 0.8–5.3)
LYMPHOCYTES NFR BLD AUTO: 19 %
MAGNESIUM SERPL-MCNC: 1.9 MG/DL (ref 1.7–2.3)
MCH RBC QN AUTO: 28 PG (ref 26.5–33)
MCHC RBC AUTO-ENTMCNC: 30.3 G/DL (ref 31.5–36.5)
MCV RBC AUTO: 92 FL (ref 78–100)
MONOCYTES # BLD AUTO: 0.3 10E3/UL (ref 0–1.3)
MONOCYTES NFR BLD AUTO: 9 %
MYCOPHENOLATE SERPL LC/MS/MS-MCNC: 1.35 MG/L (ref 1–3.5)
MYCOPHENOLATE-G SERPL LC/MS/MS-MCNC: 62.7 MG/L (ref 30–95)
NEUTROPHILS # BLD AUTO: 2.4 10E3/UL (ref 1.6–8.3)
NEUTROPHILS NFR BLD AUTO: 66 %
NITRATE UR QL: NEGATIVE
NRBC # BLD AUTO: 0 10E3/UL
NRBC BLD AUTO-RTO: 0 /100
PH UR STRIP: 7 [PH] (ref 5–7)
PHOSPHATE SERPL-MCNC: 3.7 MG/DL (ref 2.5–4.5)
PLATELET # BLD AUTO: 185 10E3/UL (ref 150–450)
POTASSIUM SERPL-SCNC: 4.2 MMOL/L (ref 3.4–5.3)
PROT SERPL-MCNC: 6.2 G/DL (ref 6.4–8.3)
PROT/CREAT 24H UR: 0.17 MG/MG CR (ref 0–0.2)
RBC # BLD AUTO: 4.32 10E6/UL (ref 4.4–5.9)
RBC URINE: 1 /HPF
SODIUM SERPL-SCNC: 138 MMOL/L (ref 135–145)
SP GR UR STRIP: 1.02 (ref 1–1.03)
TME LAST DOSE: NORMAL H
TME LAST DOSE: NORMAL H
URATE SERPL-MCNC: 5 MG/DL (ref 3.4–7)
UROBILINOGEN UR STRIP-MCNC: NORMAL MG/DL
WBC # BLD AUTO: 3.6 10E3/UL (ref 4–11)
WBC # BLD AUTO: 3.6 10E3/UL (ref 4–11)
WBC URINE: 3 /HPF

## 2024-02-02 PROCEDURE — 99204 OFFICE O/P NEW MOD 45 MIN: CPT | Mod: 25 | Performed by: DERMATOLOGY

## 2024-02-02 PROCEDURE — 86832 HLA CLASS I HIGH DEFIN QUAL: CPT | Performed by: INTERNAL MEDICINE

## 2024-02-02 PROCEDURE — 84100 ASSAY OF PHOSPHORUS: CPT | Performed by: PATHOLOGY

## 2024-02-02 PROCEDURE — 82728 ASSAY OF FERRITIN: CPT | Performed by: PATHOLOGY

## 2024-02-02 PROCEDURE — 83735 ASSAY OF MAGNESIUM: CPT | Performed by: PATHOLOGY

## 2024-02-02 PROCEDURE — 83540 ASSAY OF IRON: CPT | Performed by: PATHOLOGY

## 2024-02-02 PROCEDURE — 82248 BILIRUBIN DIRECT: CPT | Performed by: PATHOLOGY

## 2024-02-02 PROCEDURE — 85025 COMPLETE CBC W/AUTO DIFF WBC: CPT | Performed by: PATHOLOGY

## 2024-02-02 PROCEDURE — 36415 COLL VENOUS BLD VENIPUNCTURE: CPT | Performed by: PATHOLOGY

## 2024-02-02 PROCEDURE — 84156 ASSAY OF PROTEIN URINE: CPT | Performed by: PATHOLOGY

## 2024-02-02 PROCEDURE — 83876 ASSAY MYELOPEROXIDASE: CPT | Performed by: INTERNAL MEDICINE

## 2024-02-02 PROCEDURE — 86833 HLA CLASS II HIGH DEFIN QUAL: CPT | Performed by: INTERNAL MEDICINE

## 2024-02-02 PROCEDURE — 17110 DESTRUCTION B9 LES UP TO 14: CPT | Mod: GC | Performed by: DERMATOLOGY

## 2024-02-02 PROCEDURE — 83516 IMMUNOASSAY NONANTIBODY: CPT | Performed by: INTERNAL MEDICINE

## 2024-02-02 PROCEDURE — 84550 ASSAY OF BLOOD/URIC ACID: CPT | Performed by: PATHOLOGY

## 2024-02-02 PROCEDURE — 83550 IRON BINDING TEST: CPT | Performed by: PATHOLOGY

## 2024-02-02 PROCEDURE — 80197 ASSAY OF TACROLIMUS: CPT | Performed by: INTERNAL MEDICINE

## 2024-02-02 PROCEDURE — 80053 COMPREHEN METABOLIC PANEL: CPT | Performed by: PATHOLOGY

## 2024-02-02 PROCEDURE — 81001 URINALYSIS AUTO W/SCOPE: CPT | Performed by: PATHOLOGY

## 2024-02-02 PROCEDURE — 99000 SPECIMEN HANDLING OFFICE-LAB: CPT | Performed by: PATHOLOGY

## 2024-02-02 PROCEDURE — 87799 DETECT AGENT NOS DNA QUANT: CPT | Performed by: INTERNAL MEDICINE

## 2024-02-02 PROCEDURE — 80180 DRUG SCRN QUAN MYCOPHENOLATE: CPT | Performed by: INTERNAL MEDICINE

## 2024-02-02 RX ORDER — MUPIROCIN 20 MG/G
OINTMENT TOPICAL 2 TIMES DAILY
Qty: 60 G | Refills: 3 | Status: SHIPPED | OUTPATIENT
Start: 2024-02-02

## 2024-02-02 RX ORDER — CLOTRIMAZOLE 1 %
CREAM (GRAM) TOPICAL 2 TIMES DAILY
Qty: 60 G | Refills: 11 | Status: SHIPPED | OUTPATIENT
Start: 2024-02-02

## 2024-02-02 RX ORDER — MOMETASONE FUROATE 1 MG/G
OINTMENT TOPICAL DAILY
Qty: 45 G | Refills: 3 | Status: SHIPPED | OUTPATIENT
Start: 2024-02-02 | End: 2024-03-25

## 2024-02-02 RX ORDER — TRIAMCINOLONE ACETONIDE 1 MG/G
OINTMENT TOPICAL 2 TIMES DAILY
Qty: 454 G | Refills: 3 | Status: SHIPPED | OUTPATIENT
Start: 2024-02-02

## 2024-02-02 ASSESSMENT — PAIN SCALES - GENERAL: PAINLEVEL: NO PAIN (0)

## 2024-02-02 NOTE — NURSING NOTE
Dermatology Rooming Note    Damion Quinones's goals for this visit include:   Chief Complaint   Patient presents with    Skin Check     Casey is here today for a skin check - rash on body,      JUNIE Cheng

## 2024-02-02 NOTE — LETTER
2/2/2024       RE: Damion Quinones   [address] [facility] [address]     Dear Colleague,    Thank you for referring your patient, Damion Quinones, to the [facility] DERMATOLOGY CLINIC [place] at [facility]. Please see a copy of my visit note below.    [facility] Dermatology Note  Encounter Date: Feb 2, 2024  Office Visit     Dermatology Problem List:  FBSE 02/02/24   Liver and renal transplant 6/2023 on MMF, tacrolimus, and prednisone  S[place]ract High Risk   Hx of psoriasis with PsA on anakinra   Prior tx with Humira (discontinued 2022)  Family hx of melanoma (brother)  Eczematous dermatitis on abdomen  Stasis dermatitis  Tinea pedis    Pertinent PMH  Hx of ANCA vasculitis with renal involvement  Hx of alcoholic cirrhosis c/b HCC     ____________________________________________    Assessment & Plan:   1. Liver and renal transplant 6/2023 on MMF, tacrolimus, and prednisone  2. Family hx of melanoma (brother)  - SUNTRAC High Risk  - Discussed increased of NMSC and importance of sun-protection and sunscreen use  - Signs and symptoms of NMSC discussed with patient and importance of self/partner skin exams  - Yearly skin exams advised  - ABCDEs of melanoma handout provided    2. Hx of psoriasis with PsA on anakinra   Longstanding hx diagnosed >20 years ago previously on Humira, has since transitioned to Anakinra given joint involvement. No significant psoriasiform dermatitis noted on exam. Followed closely by Rheumatology - also with hx of gout.  - Topicals as below for now    3. Eczematous dermatitis on abdomen with impetiginization   New onset eczematous plaque on lower abdomen around umbilicus. Query allergic contact dermatitis 2/2 adhesives or surgical prep with some appearance of asteatotic eczema given peripheral appearance. Impetiginized with yellow crusting without signs of cellulitis. Few additional eczematous lesions on trunk and  extremities. Recommended optimization of topical regimen with soak and smear techniques to repair skin barrier function and topical CS to address inflammation. SE of topical CS discussed and provided education on proper use.   - Soak and smear, bleach baths instructions provided  - Mometasone BID until resolved  - Mupirocin BID until resolved  - TMC 0.1% ointment BID PRN for thinner eczematous plaques  - If worsening, consider patch testing    4. Stasis dermatitis  Physical exam consistent with stasis dermatitis and xerosis cutis. Recommended optimization of topical regimen and use of compression socks to treat underlying cause.  - TMC 0.1% ointment BID PRN for thinner eczematous plaques  - Mometasone BID for thicker eczematous plaques  - Compression socks   - Advised urea cream as emollient    5. Tinea pedis  - Start clotrimazole BID until resolved for bilateral feet    6. Inflamed Seborrheic keratosis, right posterior thigh  - LN2 as below    7. Benign skin findings  - Physical exam demonstrating benign skin findings as below.  - Seborrheic keratoses  - Solar lentingos   - Benign nevi  - Reassurance provided.  - Advised patient to return to clinic for any new or concerning lesions.    Procedures Performed:   - Cryotherapy procedure note, location(s): right posterior thigh. After verbal consent and discussion of risks and benefits including, but not limited to, dyspigmentation/scar, blister, and pain, 1 lesion(s) was(were) treated with 1-2 mm freeze border for 1-2 cycles with liquid nitrogen. Post cryotherapy instructions were provided.    Follow-up: 6 months dermatitis follow up, 1 year FBSE, prn for new or changing lesions    Staff and Resident: Marc Bangura MD  PGY-4 Dermatology  Pager: [phone]    Staff Physician Comments:   I saw and evaluated the patient with the resident and I edited the assessment and plan as documented in the note. I was present for the entire minor procedure and  examination.    Tito Tran MD   of Dermatology  Department of Dermatology  [facility] [employer]          ____________________________________________    CC: Skin Check (Casey is here today for a skin check - rash on body, )    HPI:  Mr. Damion Quinones is a(n) 68 year old male who presents today as a new patient for multiple concerns.    - rash on abdomen, itchy  - started after his surgery end of December  - rx'd TMC, uses it only a few times  - showers every 3 days, uses scented soaps and body washes  - does not moisturize regularly   - lesion(s) of concern on posterior right thigh    Personal hx of skin cancer: no  Family history of melanoma: yes, brother 60s  Wears sunscreen consistently: no  Blistering sunburns as a child: yes  History of tanning bed use: no  History of immunosuppression: yes  No lesions that are bleeding, growing, or tender.     SH  Born and raised in [place]  Work as retired IT    - Patient is otherwise feeling well, without additional skin concerns.    Labs Reviewed:  N/A    Physical Exam:  Vitals: There were no vitals taken for this visit.  SKIN: Total skin excluding the undergarment areas was performed. The exam included the head/face, neck, both arms, chest, back, abdomen, both legs, digits and/or nails.   - Lower abdomen with ill-defined eczematous plaque with xerotic dry river bed appearance peripherally and yellow honey colored crusting, no tender or purulent drainage  - Scattered eczematous excoriated papules and plaques on the back and hands, some with mild yellow crusting  - Interdigital white maceration of bilateral feet  - Diffuse xerosis of bilateral lower extremities with scattered lichenfied and eczematous plaques on background of 1+ edema   - Right posterior thigh with flesh colored pink waxy stuck on plaque  - Waxy stuck on tan to brown papules on the trunk and extremities.   - There are fine lines and dyspigmentation on sun  exposed areas of the face and chest.  - Scattered brown macules on sun exposed areas.  - Psoriatic joint changes of bilateral hands and feet with cutaneous tophi noted   - No other lesions of concern on areas examined.     Medications:  Current Outpatient Medications   Medication    Alcohol Swabs PADS    allopurinol (ZYLOPRIM) 300 MG tablet    anakinra (KINERET) 100 MG/0.67ML SOSY injection    apixaban ANTICOAGULANT (ELIQUIS ANTICOAGULANT) 5 MG tablet    atorvastatin (LIPITOR) 40 MG tablet    capsaicin (ZOSTRIX) 0.025 % external cream    furosemide (LASIX) 20 MG tablet    gabapentin (NEURONTIN) 100 MG capsule    levothyroxine (SYNTHROID/LEVOTHROID) 88 MCG tablet    magnesium oxide (MAG-OX) 400 MG tablet    metoprolol tartrate (LOPRESSOR) 50 MG tablet    multivitamin w/minerals (THERA-VIT-M) tablet    MYFORTIC (BRAND) 180 MG EC tablet    pantoprazole (PROTONIX) 20 MG EC tablet    predniSONE (DELTASONE) 5 MG tablet    Sharps Container MISC    simethicone (MYLICON) 125 MG chewable tablet    sulfamethoxazole-trimethoprim (BACTRIM) 400-80 MG tablet    tacrolimus (GENERIC) 0.5 MG capsule    tadalafil (CIALIS) 5 MG tablet    triamcinolone (KENALOG) 0.1 % external ointment    ursodiol (ACTIGALL) 250 MG tablet    tacrolimus (GENERIC) 1 MG capsule     No current facility-administered medications for this visit.      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)    HTN, kidney transplant related    Hereditary hemochromatosis (H24)    Dyslipidemia    Tophaceous gout    Deep vein thrombosis (DVT) of non-extremity vein, unspecified chronicity    CKD (chronic kidney disease) stage 2, GFR 60-89 ml/min    Paroxysmal atrial fibrillation (H)    Liver replaced by transplant (H)    Immunosuppressed status (H24)    Kidney replaced by transplant    CAD (coronary artery disease)    Steroid-induced  hyperglycemia    Aftercare following organ transplant    HCC (hepatocellular carcinoma) (H)    Peripheral neuropathy    Anemia in chronic renal disease    Class 2 severe obesity due to excess calories with serious comorbidity in adult (H)    S/P hernia repair    C. difficile colitis    Hypothyroidism    Hypomagnesemia    Need for pneumocystis prophylaxis    Cytomegalovirus (CMV) viremia (H)    GERD (gastroesophageal reflux disease)     Past Medical History:   Diagnosis Date    Alcoholic cirrhosis of liver with ascites (H) 10/11/2019    ANCA-associated vasculitis (H) 2022    Antiplatelet or antithrombotic long-term use     Avascular necrosis (H)     Left femoral head    C. difficile diarrhea     Coronary artery disease     [facility] 4/2023 - complete occlusion of RCA    ESRD (end stage renal disease) on dialysis (H)     Gout     HCC (hepatocellular carcinoma) (H) 09/05/2023    History of hemochromatosis 10/11/2019    Hypertension     Hypothyroidism 12/19/2023    IgA nephropathy     Obesity     PAF (paroxysmal atrial fibrillation) (H)     Pre-diabetes 2023    Psoriatic arthritis (H)     RA (rheumatoid arthritis) (H)     Status post kidney transplant 06/06/2023    Induction with thymoglobulin 4mg/kg, + DSA CW9    Status post liver transplantation (H) 06/06/2023       CC Referred Self, MD  No address on file on close of this encounter.

## 2024-02-02 NOTE — PROGRESS NOTES
[facility] Dermatology Note  Encounter Date: Feb 2, 2024  Office Visit     Dermatology Problem List:  FBSE 02/02/24   Liver and renal transplant 6/2023 on MMF, tacrolimus, and prednisone  [place] High Risk   Hx of psoriasis with PsA on anakinra   Prior tx with Humira (discontinued 2022)  Family hx of melanoma (brother)  Eczematous dermatitis on abdomen  Stasis dermatitis  Tinea pedis    Pertinent PMH  Hx of ANCA vasculitis with renal involvement  Hx of alcoholic cirrhosis c/b HCC     ____________________________________________    Assessment & Plan:   1. Liver and renal transplant 6/2023 on MMF, tacrolimus, and prednisone  2. Family hx of melanoma (brother)  - SUNTRAC High Risk  - Discussed increased of NMSC and importance of sun-protection and sunscreen use  - Signs and symptoms of NMSC discussed with patient and importance of self/partner skin exams  - Yearly skin exams advised  - ABCDEs of melanoma handout provided    2. Hx of psoriasis with PsA on anakinra   Longstanding hx diagnosed >20 years ago previously on Humira, has since transitioned to Anakinra given joint involvement. No significant psoriasiform dermatitis noted on exam. Followed closely by Rheumatology - also with hx of gout.  - Topicals as below for now    3. Eczematous dermatitis on abdomen with impetiginization   New onset eczematous plaque on lower abdomen around umbilicus. Query allergic contact dermatitis 2/2 adhesives or surgical prep with some appearance of asteatotic eczema given peripheral appearance. Impetiginized with yellow crusting without signs of cellulitis. Few additional eczematous lesions on trunk and extremities. Recommended optimization of topical regimen with soak and smear techniques to repair skin barrier function and topical CS to address inflammation. SE of topical CS discussed and provided education on proper use.   - Soak and smear, bleach baths instructions provided  - Mometasone BID until resolved  -  Mupirocin BID until resolved  - TMC 0.1% ointment BID PRN for thinner eczematous plaques  - If worsening, consider patch testing    4. Stasis dermatitis  Physical exam consistent with stasis dermatitis and xerosis cutis. Recommended optimization of topical regimen and use of compression socks to treat underlying cause.  - TMC 0.1% ointment BID PRN for thinner eczematous plaques  - Mometasone BID for thicker eczematous plaques  - Compression socks   - Advised urea cream as emollient    5. Tinea pedis  - Start clotrimazole BID until resolved for bilateral feet    6. Inflamed Seborrheic keratosis, right posterior thigh  - LN2 as below    7. Benign skin findings  - Physical exam demonstrating benign skin findings as below.  - Seborrheic keratoses  - Solar lentingos   - Benign nevi  - Reassurance provided.  - Advised patient to return to clinic for any new or concerning lesions.    Procedures Performed:   - Cryotherapy procedure note, location(s): right posterior thigh. After verbal consent and discussion of risks and benefits including, but not limited to, dyspigmentation/scar, blister, and pain, 1 lesion(s) was(were) treated with 1-2 mm freeze border for 1-2 cycles with liquid nitrogen. Post cryotherapy instructions were provided.    Follow-up: 6 months dermatitis follow up, 1 year FBSE, prn for new or changing lesions    Staff and Resident: Marc Bangura MD  PGY-4 Dermatology  Pager: [phone]    Staff Physician Comments:   I saw and evaluated the patient with the resident and I edited the assessment and plan as documented in the note. I was present for the entire minor procedure and examination.    Tito Tran MD   of Dermatology  Department of Dermatology  [facility] [employer]          ____________________________________________    CC: Skin Check (Casey is here today for a skin check - rash on body, )    HPI:  Mr. Damion Quinones is a(n) 68 year old male who  presents today as a new patient for multiple concerns.    - rash on abdomen, itchy  - started after his surgery end of December  - rx'd [facility], uses it only a few times  - showers every 3 days, uses scented soaps and body washes  - does not moisturize regularly   - lesion(s) of concern on posterior right thigh    Personal hx of skin cancer: no  Family history of melanoma: yes, brother 60s  Wears sunscreen consistently: no  Blistering sunburns as a child: yes  History of tanning bed use: no  History of immunosuppression: yes  No lesions that are bleeding, growing, or tender.     SH  Born and raised in [place]  Work as retired IT    - Patient is otherwise feeling well, without additional skin concerns.    Labs Reviewed:  N/A    Physical Exam:  Vitals: There were no vitals taken for this visit.  SKIN: Total skin excluding the undergarment areas was performed. The exam included the head/face, neck, both arms, chest, back, abdomen, both legs, digits and/or nails.   - Lower abdomen with ill-defined eczematous plaque with xerotic dry river bed appearance peripherally and yellow honey colored crusting, no tender or purulent drainage  - Scattered eczematous excoriated papules and plaques on the back and hands, some with mild yellow crusting  - Interdigital white maceration of bilateral feet  - Diffuse xerosis of bilateral lower extremities with scattered lichenfied and eczematous plaques on background of 1+ edema   - Right posterior thigh with flesh colored pink waxy stuck on plaque  - Waxy stuck on tan to brown papules on the trunk and extremities.   - There are fine lines and dyspigmentation on sun exposed areas of the face and chest.  - Scattered brown macules on sun exposed areas.  - Psoriatic joint changes of bilateral hands and feet with cutaneous tophi noted   - No other lesions of concern on areas examined.     Medications:  Current Outpatient Medications   Medication    Alcohol Swabs PADS    allopurinol (ZYLOPRIM) 300  MG tablet    anakinra (KINERET) 100 MG/0.67ML SOSY injection    apixaban ANTICOAGULANT (ELIQUIS ANTICOAGULANT) 5 MG tablet    atorvastatin (LIPITOR) 40 MG tablet    capsaicin (ZOSTRIX) 0.025 % external cream    furosemide (LASIX) 20 MG tablet    gabapentin (NEURONTIN) 100 MG capsule    levothyroxine (SYNTHROID/LEVOTHROID) 88 MCG tablet    magnesium oxide (MAG-OX) 400 MG tablet    metoprolol tartrate (LOPRESSOR) 50 MG tablet    multivitamin w/minerals (THERA-VIT-M) tablet    MYFORTIC (BRAND) 180 MG EC tablet    pantoprazole (PROTONIX) 20 MG EC tablet    predniSONE (DELTASONE) 5 MG tablet    Sharps Container MISC    simethicone (MYLICON) 125 MG chewable tablet    sulfamethoxazole-trimethoprim (BACTRIM) 400-80 MG tablet    tacrolimus (GENERIC) 0.5 MG capsule    tadalafil (CIALIS) 5 MG tablet    triamcinolone (KENALOG) 0.1 % external ointment    ursodiol (ACTIGALL) 250 MG tablet    tacrolimus (GENERIC) 1 MG capsule     No current facility-administered medications for this visit.      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)    HTN, kidney transplant related    Hereditary hemochromatosis (H24)    Dyslipidemia    Tophaceous gout    Deep vein thrombosis (DVT) of non-extremity vein, unspecified chronicity    CKD (chronic kidney disease) stage 2, GFR 60-89 ml/min    Paroxysmal atrial fibrillation (H)    Liver replaced by transplant (H)    Immunosuppressed status (H24)    Kidney replaced by transplant    CAD (coronary artery disease)    Steroid-induced hyperglycemia    Aftercare following organ transplant    HCC (hepatocellular carcinoma) (H)    Peripheral neuropathy    Anemia in chronic renal disease    Class 2 severe obesity due to excess calories with serious comorbidity in adult (H)    S/P hernia repair    C. difficile colitis    Hypothyroidism    Hypomagnesemia    Need for  pneumocystis prophylaxis    Cytomegalovirus (CMV) viremia (H)    GERD (gastroesophageal reflux disease)     Past Medical History:   Diagnosis Date    Alcoholic cirrhosis of liver with ascites (H) 10/11/2019    ANCA-associated vasculitis (H) 2022    Antiplatelet or antithrombotic long-term use     Avascular necrosis (H)     Left femoral head    C. difficile diarrhea     Coronary artery disease     [facility] 4/2023 - complete occlusion of RCA    ESRD (end stage renal disease) on dialysis (H)     Gout     HCC (hepatocellular carcinoma) (H) 09/05/2023    History of hemochromatosis 10/11/2019    Hypertension     Hypothyroidism 12/19/2023    IgA nephropathy     Obesity     PAF (paroxysmal atrial fibrillation) (H)     Pre-diabetes 2023    Psoriatic arthritis (H)     RA (rheumatoid arthritis) (H)     Status post kidney transplant 06/06/2023    Induction with thymoglobulin 4mg/kg, + DSA CW9    Status post liver transplantation (H) 06/06/2023       CC Referred Self, MD  No address on file on close of this encounter.

## 2024-02-02 NOTE — PATIENT INSTRUCTIONS
"- Everyday try to shower (no soap is needed unless soiled) or try a bleach bath and MOISTURIZE after!    - For the abdomen   - Apply mometasone twice a day until resolved   - Apply mupirocin ointment twice a day until resolved   - Okay to mix the two ointments together    - For the legs   - Use triamcinolone twice a day until resolved   - Wear compression socks daily   - Can try urea cream    - For thicker areas, can use mometasone    - For the feet   - Start clotrimazole for the athlete foot twice a day until resolved    Dilute Bleach Bath Instructions    What are dilute bleach baths?  Dilute bleach baths are used to help fight bacteria that is commonly found on the skin; this bacteria may be preventing your skin from healing. If is also used to calm inflammation in skin, even if infection is not present. The dilution ratio we recommend is the same concentration that is in a swimming pool. This technique is safe and can help prevent your infant or child from needing oral antibiotics for basic staph bacteria on the skin.      Type of bleach:  - Regular, plain, household bleach used for cleaning clothing. Brand or Generic is okay.   - Make sure this is plain or concentrated bleach. The bleach bottle should not contain any of the following words \"pour safe, with fabric protection, with cloromax technology, splash free, splash less, gentle or color safe.\"   - There should not be any added fragrance to the bleach; such a lavender.    How do I make a dilute bleach bath?  - Pour 1/3 of concentrated bleach or 1/2 cup of plain of bleach into an adult size bath tub of 4-6 inches of lukewarm water.  - For smaller tubs (infant size tubs), add two tablespoons of bleach to the tub water.   - Bleach baths work better if your child is able to submerge most of their skin, so consider placing the infant tub in the larger tub.   - Repeat bleach baths as recommended by your provider.    Other information:  - Do not pour bleach " directly onto the skin.  - If is safe to get the bleach mixture on your face and scalp.  - Do not drink the bleach mixture.  - Keep bleach bottle out of reach of children      Cryotherapy    What is it?  Use of a very cold liquid, such as liquid nitrogen, to freeze and destroy abnormal skin cells that need to be removed    What should I expect?  Tenderness and redness  A small blister that might grow and fill with dark purple blood. There may be crusting.  More than one treatment may be needed if the lesions do not go away.    How do I care for the treated area?  Gently wash the area with your hands when bathing.  Use a thin layer of Vaseline to help with healing. You may use a Band-Aid.   The area should heal within 7-10 days and may leave behind a pink or lighter color.   Do not use an antibiotic or Neosporin ointment.   You may take acetaminophen (Tylenol) for pain.     Call your doctor if you have:  Severe pain  Signs of infection (warmth, redness, cloudy yellow drainage, and or a bad smell)  Questions or concerns    Who should I call with questions?      [facility]: [phone]      [facility]: [phone]      For urgent needs outside of business hours call the [facility] at [phone] and ask for the dermatology resident on call        Checking for Skin Cancer  You can help find cancer early by checking your skin each month. There are 3 main kinds of skin cancer: melanoma, basal cell carcinoma, and squamous cell carcinoma. Doing monthly skin checks is the best way to find new marks, sores, or skin changes. Follow these instructions for checking your skin.   The ABCDEs of checking moles for melanoma   Check your moles or growths for signs of melanoma using ABCDE:   Asymmetry: The sides of the mole or growth don t match.  Border: The edges are ragged, notched, or blurred.  Color: The color within the mole or growth varies. It could be  black, brown, tan, white, or shades of red, gray, or blue.  Diameter: The mole or growth is larger than   inch or 6 mm (size of a pencil eraser).  Evolving: The size, shape, texture, or color of the mole or growth is changing.     ABCDE's of moles on light skin.        ABCDE's of moles on dark skin may be harder to identify.     Checking for other types of skin cancer  Basal cell carcinoma or squamous cell carcinoma cause symptoms like:     A spot or mole that looks different from all other marks on your skin  Changes in how an area feels, such as itching, tenderness, or pain  Changes in the skin's surface, such as oozing, bleeding, or scaliness  A sore that doesn't heal  New swelling, redness, or spread of color beyond the border of a mole    Who s at risk?  Anyone of any skin color can get skin cancer. But you're at greater risk if you have:   Fair skin that freckles easily and burns instead of tanning  Light-colored or red hair  Light-colored eyes  Many moles or abnormal moles on your skin  A long history of unprotected exposure to sunlight or tanning beds  A history of many blistering sunburns as a child or teen  A family history of skin cancer  Been exposed to radiation or chemicals  A weakened immune system  Been exposed to arsenic  If you've had skin cancer in the past, you're at high risk of having it again.   How to check your skin  Do your monthly skin checkups in front of a full-length mirror. Use a room with good lighting so it's easier to see. Use a hand mirror to look at hard-to-see places like your buttocks and back. You can also have a trusted friend or family member help you with these checks. Check every part of your body, including your:   Head (ears, face, neck, and scalp)  Torso (front, back, sides, and under breasts)  Arms (tops, undersides, and armpits)  Hands (palms, backs, and fingers, including under the nails)  Lower back, buttocks, and genitals  Legs (front, back, and sides)  Feet (tops,  soles, toes, including under the nails, and between toes)  Watch for new spots on your skin or a spot that's changing in color, shape, size.   If you have a lot of moles, take digital photos of them each month. Make sure to take photos both up close and from a distance. These can help you see if any moles change over time.   Know your skin  Most skin changes aren't cancer. But if you see any changes in your skin, call your healthcare provider right away. Only they can tell you if a change is a problem. If you have skin cancer, seeing your provider can be the first step to getting the treatment that could save your life.   [email or web address] last reviewed this educational content on 10/1/2021    7518-0368 [employer]. All rights reserved. This information is not intended as a substitute for professional medical care. Always follow your healthcare professional's instructions.

## 2024-02-05 ENCOUNTER — TELEPHONE (OUTPATIENT)
Dept: TRANSPLANT | Facility: CLINIC | Age: 69
End: 2024-02-05
Payer: MEDICARE

## 2024-02-05 DIAGNOSIS — Z94.4 LIVER REPLACED BY TRANSPLANT (H): ICD-10-CM

## 2024-02-05 DIAGNOSIS — Z94.0 KIDNEY REPLACED BY TRANSPLANT: ICD-10-CM

## 2024-02-05 DIAGNOSIS — Z94.0 KIDNEY REPLACED BY TRANSPLANT: Primary | ICD-10-CM

## 2024-02-05 DIAGNOSIS — Z94.4 LIVER TRANSPLANT RECIPIENT (H): Primary | ICD-10-CM

## 2024-02-05 LAB
TACROLIMUS BLD-MCNC: 3.3 UG/L (ref 5–15)
TME LAST DOSE: ABNORMAL H
TME LAST DOSE: ABNORMAL H

## 2024-02-05 RX ORDER — TACROLIMUS 1 MG/1
1 CAPSULE ORAL EVERY 12 HOURS
Qty: 180 CAPSULE | Refills: 3 | Status: SHIPPED | OUTPATIENT
Start: 2024-02-05 | End: 2024-03-25

## 2024-02-05 RX ORDER — TACROLIMUS 0.5 MG/1
0.5 CAPSULE ORAL PRN
Qty: 180 CAPSULE | Refills: 3 | Status: SHIPPED | OUTPATIENT
Start: 2024-02-05 | End: 2024-03-25

## 2024-02-05 NOTE — TELEPHONE ENCOUNTER
General review of kidney transplant  labs     Due for Donor Specific Antibodies  - updated orders in EPIC

## 2024-02-05 NOTE — TELEPHONE ENCOUNTER
ISSUE:   Tacrolimus IR level 3.3 on 2/2, goal 6-8, dose 0.5 mg BID.    PLAN:   Call Patientand confirm this was an accurate 12-hour trough.   Verify Tacrolimus IR dose 0.5 mg BID.   Confirm no new medications or illness.   Confirm no missed doses.   If accurate trough and accurate dose, increase Tacrolimus IR dose to 1 mg BID     Is this more than a 50% increase or decrease in current IS dose: Yes  If YES, justification: low current dose of tacrolimus, low level    Repeat labs in 1 weeks.  *If > 50% change in immunosuppression dose, repeat labs in 1 week.     OUTCOME:   Spoke with Patient, they confirm accurate trough level and current dose 0.5 mg BID.   Patientconfirmed dose change to 1 mg BID.  Patientagreed to repeat labs in 1 weeks.   Orders sent to preferred pharmacy for dose change and lab for repeat labs.   Patientvoiced understanding of plan.

## 2024-02-06 ENCOUNTER — TRANSFERRED RECORDS (OUTPATIENT)
Dept: HEALTH INFORMATION MANAGEMENT | Facility: CLINIC | Age: 69
End: 2024-02-06
Payer: MEDICARE

## 2024-02-06 LAB
MYELOPEROXIDASE AB SER IA-ACNC: 0.4 U/ML
MYELOPEROXIDASE AB SER IA-ACNC: NEGATIVE
PROTEINASE3 AB SER IA-ACNC: <1 U/ML
PROTEINASE3 AB SER IA-ACNC: NEGATIVE

## 2024-02-09 LAB
CW9: 586
DONOR IDENTIFICATION: NORMAL
DSA COMMENTS: NORMAL
DSA PRESENT: YES
DSA TEST METHOD: NORMAL
ORGAN: NORMAL
SA 1 CELL: NORMAL
SA 1 TEST METHOD: NORMAL
SA 2 CELL: NORMAL
SA 2 TEST METHOD: NORMAL
SA1 HI RISK ABY: NORMAL
SA1 MOD RISK ABY: NORMAL
SA2 HI RISK ABY: NORMAL
SA2 MOD RISK ABY: NORMAL
UNACCEPTABLE ANTIGENS: NORMAL
ZZZSA 1  COMMENTS: NORMAL
ZZZSA 2 COMMENTS: NORMAL

## 2024-02-10 ENCOUNTER — HOSPITAL ENCOUNTER (OUTPATIENT)
Dept: MRI IMAGING | Facility: CLINIC | Age: 69
Discharge: HOME | End: 2024-02-10
Attending: PSYCHIATRY & NEUROLOGY | Admitting: PSYCHIATRY & NEUROLOGY
Payer: COMMERCIAL

## 2024-02-10 DIAGNOSIS — M47.27 OSTEOARTHRITIS OF SPINE WITH RADICULOPATHY, LUMBOSACRAL REGION: ICD-10-CM

## 2024-02-10 PROCEDURE — 72158 MRI LUMBAR SPINE W/O & W/DYE: CPT | Mod: MF

## 2024-02-10 PROCEDURE — A9585 GADOBUTROL INJECTION: HCPCS | Performed by: PSYCHIATRY & NEUROLOGY

## 2024-02-10 PROCEDURE — G1010 CDSM STANSON: HCPCS | Performed by: STUDENT IN AN ORGANIZED HEALTH CARE EDUCATION/TRAINING PROGRAM

## 2024-02-10 PROCEDURE — 255N000002 HC RX 255 OP 636: Performed by: PSYCHIATRY & NEUROLOGY

## 2024-02-10 PROCEDURE — 72158 MRI LUMBAR SPINE W/O & W/DYE: CPT | Mod: 26 | Performed by: STUDENT IN AN ORGANIZED HEALTH CARE EDUCATION/TRAINING PROGRAM

## 2024-02-10 PROCEDURE — 999N000127 HC STATISTIC PERIPHERAL IV START W US GUIDANCE

## 2024-02-10 RX ORDER — GADOBUTROL 604.72 MG/ML
0.1 INJECTION INTRAVENOUS ONCE
Status: COMPLETED | OUTPATIENT
Start: 2024-02-10 | End: 2024-02-10

## 2024-02-10 RX ADMIN — GADOBUTROL 9.67 ML: 604.72 INJECTION INTRAVENOUS at 08:26

## 2024-02-12 ENCOUNTER — PATIENT OUTREACH (OUTPATIENT)
Dept: CARE COORDINATION | Facility: CLINIC | Age: 69
End: 2024-02-12

## 2024-02-12 ENCOUNTER — LAB (OUTPATIENT)
Dept: LAB | Facility: CLINIC | Age: 69
End: 2024-02-12
Payer: COMMERCIAL

## 2024-02-12 DIAGNOSIS — Z94.0 KIDNEY REPLACED BY TRANSPLANT: ICD-10-CM

## 2024-02-12 DIAGNOSIS — Z94.4 LIVER TRANSPLANT RECIPIENT (H): ICD-10-CM

## 2024-02-12 LAB
TACROLIMUS BLD-MCNC: 5.9 UG/L (ref 5–15)
TME LAST DOSE: NORMAL H
TME LAST DOSE: NORMAL H

## 2024-02-12 PROCEDURE — 80197 ASSAY OF TACROLIMUS: CPT

## 2024-02-12 PROCEDURE — 36415 COLL VENOUS BLD VENIPUNCTURE: CPT

## 2024-02-12 NOTE — PROGRESS NOTES
Anemia Management Note - Follow Up      SUBJECTIVE/OBJECTIVE:    Referred by Dr. Subhash Dutta on 2023  Primary Diagnosis: Anemia of Other Chronic Illness (D63.8)     Secondary Diagnosis:  Organ or tissue replaced by transplant, kidney (Z94.0)  Kidney Tx: 2023  Hgb goal range:  9-10  Epo/Darbo: Aranesp  40 mcg  every two weeks for Hgb <10.0. In clinic  *If Aranesp is ordered dose at 0.45mcg/kg  Iron regimen:  NA  Injectafer completed [id number]     Labs : 2024  Recent FEDERICO use, transfusion, IV iron: Mircera   RX/TX plans : 2024     *[facility]; [phone]      Hx of DVT (2023), Afib, anticoagulate.      Consent to Communicate:  OK to speak with Nabila Quinones (wife) per consent to communicate dated 10/27/2022. No Boxes Checked on Consent to Communicate.         Latest Ref Rng & Units 2023   Anemia   Hemoglobin 13.3 - 17.7 g/dL 11.1  10.6  10.4  11.3   11.9  12.1    IV Iron Dose      750mg     TSAT 15 - 46 %  19      24    Ferritin 31 - 409 ng/mL  1,832      807      BP Readings from Last 3 Encounters:   01/10/24 117/71   24 (!) 132/91   23 133/80     Wt Readings from Last 2 Encounters:   24 96.7 kg (213 lb 1.6 oz)   23 100.7 kg (222 lb)           ASSESSMENT:    Hgb:Above goal - recommend hold dose  TSat: not at goal (>30%) but ferritin >500ng/mL.  IV iron not indicated at this time per anemia protocol. Ferritin: At goal (>100ng/mL)    PLAN:  RTC for Anemia Labs in 2-4 week(s).    Orders needed to be renewed (for next follow-up date) in EPIC: None    Iron labs due:  2024    Plan discussed with:  No call, chart review       NEXT FOLLOW-UP DATE:  3/4/24    Molly Fenton RN   Anemia Services  [facility]  [email or web address]   Office : [phone]  Fax: [phone]

## 2024-02-13 ENCOUNTER — OFFICE VISIT (OUTPATIENT)
Dept: FAMILY MEDICINE | Facility: CLINIC | Age: 69
End: 2024-02-13
Payer: COMMERCIAL

## 2024-02-13 ENCOUNTER — TELEPHONE (OUTPATIENT)
Dept: TRANSPLANT | Facility: CLINIC | Age: 69
End: 2024-02-13

## 2024-02-13 VITALS
RESPIRATION RATE: 16 BRPM | TEMPERATURE: 97.1 F | BODY MASS INDEX: 35.2 KG/M2 | HEART RATE: 90 BPM | SYSTOLIC BLOOD PRESSURE: 110 MMHG | HEIGHT: 66 IN | DIASTOLIC BLOOD PRESSURE: 78 MMHG | WEIGHT: 219 LBS | OXYGEN SATURATION: 99 %

## 2024-02-13 DIAGNOSIS — R53.83 OTHER FATIGUE: ICD-10-CM

## 2024-02-13 DIAGNOSIS — Z94.0 KIDNEY REPLACED BY TRANSPLANT: Primary | ICD-10-CM

## 2024-02-13 DIAGNOSIS — I48.0 PAROXYSMAL ATRIAL FIBRILLATION (H): ICD-10-CM

## 2024-02-13 DIAGNOSIS — D16.4 OSTEOMA OF EAR CANAL: ICD-10-CM

## 2024-02-13 DIAGNOSIS — J02.9 SORE THROAT: ICD-10-CM

## 2024-02-13 DIAGNOSIS — J01.10 ACUTE NON-RECURRENT FRONTAL SINUSITIS: Primary | ICD-10-CM

## 2024-02-13 LAB
FLUAV RNA SPEC QL NAA+PROBE: NEGATIVE
FLUBV RNA RESP QL NAA+PROBE: NEGATIVE
RSV RNA SPEC NAA+PROBE: POSITIVE
SARS-COV-2 RNA RESP QL NAA+PROBE: NEGATIVE

## 2024-02-13 PROCEDURE — 99213 OFFICE O/P EST LOW 20 MIN: CPT | Mod: 24 | Performed by: NURSE PRACTITIONER

## 2024-02-13 PROCEDURE — 87637 SARSCOV2&INF A&B&RSV AMP PRB: CPT | Performed by: NURSE PRACTITIONER

## 2024-02-13 RX ORDER — RESPIRATORY SYNCYTIAL VIRUS VACCINE 120MCG/0.5
0.5 KIT INTRAMUSCULAR ONCE
Qty: 1 EACH | Refills: 0 | Status: CANCELLED | OUTPATIENT
Start: 2024-02-13 | End: 2024-02-13

## 2024-02-13 RX ORDER — BLOOD-GLUCOSE METER
KIT MISCELLANEOUS
COMMUNITY
Start: 2023-07-25

## 2024-02-13 ASSESSMENT — ENCOUNTER SYMPTOMS
SHORTNESS OF BREATH: 0
SORE THROAT: 1
GASTROINTESTINAL NEGATIVE: 1
LIGHT-HEADEDNESS: 0
FEVER: 0
FACIAL ASYMMETRY: 0
SINUS PRESSURE: 1
FATIGUE: 1
RHINORRHEA: 1
SINUS PAIN: 1
CHILLS: 0
WHEEZING: 0
COUGH: 1
EYES NEGATIVE: 1

## 2024-02-13 NOTE — PROGRESS NOTES
"  Assessment & Plan     Acute non-recurrent frontal sinusitis  Delayed antibiotic prescription provided for sinusitis given transplant status and travel plans.  Advised if symptoms persist after 10 days or he has new or concerning symptoms after improvement, then recommend he start Augmentin as below.  May use saline nasal spray for nasal congestion.  Honey, lozenges for sore throat.  - amoxicillin-clavulanate (AUGMENTIN) 875-125 MG tablet; Take 1 tablet by mouth 2 times daily for 7 days    Sore throat  He is vaccinated for influenza and COVID-19.  We will check nasal swab today. Given transplant status, may be candidate for paxlovid.   - Symptomatic Influenza A/B, RSV, & SARS-CoV2 PCR (COVID-19) Nose    Other fatigue  Recommend checking for influenza, RSV and COVID today.  - Symptomatic Influenza A/B, RSV, & SARS-CoV2 PCR (COVID-19) Nose    Osteoma of ear canal  Bilateral osteoma noted.  Not obstructing ear canal.  He is asymptomatic.  If he does develop any symptoms, we can refer to ENT.    Recommend follow up with PCP in 1 month for 6 month f/up.  Order placed.           BMI  Estimated body mass index is 35.35 kg/m  as calculated from the following:    Height as of this encounter: 1.676 m (5' 6\").    Weight as of this encounter: 99.3 kg (219 lb).             Subjective   Casey is a 68 year old, presenting for the following health issues:  Cough (Cough x 4 days), Pharyngitis (Sore throat x 4 days), and Sinus Problem (Sneezing and itchy ears x 4 days)        2/13/2024     1:50 PM   Additional Questions   Roomed by SRUTHI Lewis   Accompanied by Spouse     Via the Health Maintenance questionnaire, the patient has reported the following services have been completed -Eye Exam, this information has been sent to the abstraction team.  Casey is a very pleasant 68 year old male patient of Dr. Serrano, accompanied by his wife, who presents today for ration of cough onset 4 days ago.  They are planning on traveling to [place] and " "wanted to get this checked out before leaving.  He is status post liver transplant.  He has been vaccinated for flu, COVID and RSV.  Generally been healthy since his liver transplant on June 6, 2023.  Reports bringing up yellow phlegm.  Denies fever, chills but does report feeling very tired and fatigued. Endorses sinus congestion, sinus pain and pressure, sore throat, ears feel plugged.      They relate he is due for hepatitis B and isolation, 3 dose series.  I recommend waiting until he is feeling well to have best immune response.    History of Present Illness       Reason for visit:  Sore throat cough  Symptoms include:  Cough  Symptom intensity:  Moderate  Symptom progression:  Staying the same  Had these symptoms before:  No  What makes it worse:  No  What makes it better:  Cough drops    He eats 4 or more servings of fruits and vegetables daily.He consumes 0 sweetened beverage(s) daily.He exercises with enough effort to increase his heart rate 30 to 60 minutes per day.  He exercises with enough effort to increase his heart rate 4 days per week.   He is taking medications regularly.                 Review of Systems   Constitutional:  Positive for fatigue. Negative for chills and fever.   HENT:  Positive for congestion, ear pain (plugged and itchy), rhinorrhea, sinus pressure, sinus pain and sore throat.    Eyes: Negative.    Respiratory:  Positive for cough. Negative for shortness of breath and wheezing.    Gastrointestinal: Negative.    Genitourinary: Negative.    Musculoskeletal:         Bodyaches   Neurological:  Negative for facial asymmetry and light-headedness.           Objective    /78 (BP Location: Right arm, Patient Position: Sitting, Cuff Size: Adult Regular)   Pulse 90   Temp 97.1  F (36.2  C) (Tympanic)   Resp 16   Ht 1.676 m (5' 6\")   Wt 99.3 kg (219 lb)   SpO2 99%   BMI 35.35 kg/m    Body mass index is 35.35 kg/m .  Physical Exam  Constitutional:       Appearance: Normal " appearance. He is not ill-appearing.   HENT:      Head: Normocephalic and atraumatic.      Right Ear: Tympanic membrane normal. There is no impacted cerumen.      Left Ear: Tympanic membrane normal. There is no impacted cerumen.      Ears:      Comments: White papules in ear canal consistent with osteomas bilaterally. Not obstructing the canal.  Eyes:      Pupils: Pupils are equal, round, and reactive to light.   Cardiovascular:      Rate and Rhythm: Normal rate and regular rhythm.      Pulses: Normal pulses.      Heart sounds: Normal heart sounds.   Pulmonary:      Effort: Pulmonary effort is normal.      Breath sounds: Normal breath sounds. No wheezing, rhonchi or rales.   Lymphadenopathy:      Cervical: No cervical adenopathy.   Skin:     General: Skin is warm and dry.      Capillary Refill: Capillary refill takes less than 2 seconds.   Neurological:      Mental Status: He is alert and oriented to person, place, and time.   Psychiatric:         Mood and Affect: Mood normal.                    Signed Electronically by: JADE Lynn CNP

## 2024-02-13 NOTE — TELEPHONE ENCOUNTER
Spoke with pt's spouse, Nabila, consent to communicate on file. Rescheduled [facility] appt on 6/25/24 with Dr. Braden Vragas to be with Dr. Marrero. I ill send message to nursing staff to confirm if this is OK and then send MyChart message to pt to confirm.

## 2024-02-25 ENCOUNTER — HEALTH MAINTENANCE LETTER (OUTPATIENT)
Age: 69
End: 2024-02-25

## 2024-02-26 DIAGNOSIS — Z94.0 KIDNEY REPLACED BY TRANSPLANT: ICD-10-CM

## 2024-02-26 RX ORDER — FUROSEMIDE 20 MG
20 TABLET ORAL DAILY
Qty: 30 TABLET | Refills: 0 | Status: SHIPPED | OUTPATIENT
Start: 2024-02-26 | End: 2024-03-26

## 2024-02-26 NOTE — PROGRESS NOTES
Casey needed furosemide refill sent to [facility] in [place]. Checked with Casey Goldstein's kidney coordinator, ok to refill x1 month with no refills. Prescription sent to preferred pharmacy.

## 2024-03-04 ENCOUNTER — TELEPHONE (OUTPATIENT)
Dept: TRANSPLANT | Facility: CLINIC | Age: 69
End: 2024-03-04
Payer: MEDICARE

## 2024-03-04 NOTE — LETTER
OUTPATIENT LABORATORY TEST ORDER   [facility] [place], [place]  [phone]    Patient Name: Damion Quinones   YOB: 1955     [facility] MR# [if applicable]: [id number]   Date & Time: March 4, 2024  10:23 AM  Expiration Date: 1 year after date issued      Diagnosis: Kidney Transplant (ICD-10 Z94.0)    Liver Transplant (ICD-10 Z94.4)    Aftercare following organ transplant (ICD-10 Z48.288)    Long term use of medications (ICD-10 Z79.899)    Contact with and (suspected) exposure to other viral communicable   diseases (Z20.828)     We ask your assistance in obtaining the following laboratory tests, which are part of our routine surveillance program for Solid Organ Transplant patients.     *Please fax each result to [phone], same day as resulted/available    Critical lab results page [phone]      Please draw the following labs and fax results to [phone]    CBC with platelets  Basic Metabolic Panel (Sodium, Potassium, Chloride, Creatinine, CO2, Urea Nitrogen, glucose, Calcium)  Hepatic Panel  Tacrolimus/Prograf/ drug level  Magnesium and Phosphorus  BK (Polyoma Virus) PCR Quantitative/Plasma  Urine protein/creatinine      If you have any questions please call the Transplant Center at [phone] option [phone], or [phone]. All lab results should be faxed to [phone].      Lolis Bermudez MD   of Medicine  Division of Gastroenterology, Hepatology, and Nutrition  [facility]

## 2024-03-04 NOTE — PROGRESS NOTES
Labs are scheduled for 3/25/24.     Molly Fenton RN   Anemia Services  [facility]  [email or web address]   Office : [phone]  Fax: [phone]

## 2024-03-04 NOTE — TELEPHONE ENCOUNTER
Nabila called to find out where we would like Casey to get his labs done while in [place]: [facility], [facility], or the hospital out there. Informed her that any of them are fine to just let [facility] know where and a fax number and orders will get faxed. [facility] [place] Lab orders faxed.

## 2024-03-15 DIAGNOSIS — Z94.4 LIVER TRANSPLANT RECIPIENT (H): ICD-10-CM

## 2024-03-15 RX ORDER — PANTOPRAZOLE SODIUM 20 MG/1
20 TABLET, DELAYED RELEASE ORAL
Qty: 90 TABLET | Refills: 3 | Status: SHIPPED | OUTPATIENT
Start: 2024-03-15 | End: 2024-07-29

## 2024-03-23 DIAGNOSIS — Z94.0 KIDNEY REPLACED BY TRANSPLANT: ICD-10-CM

## 2024-03-25 ENCOUNTER — OFFICE VISIT (OUTPATIENT)
Dept: TRANSPLANT | Facility: CLINIC | Age: 69
End: 2024-03-25
Attending: INTERNAL MEDICINE
Payer: COMMERCIAL

## 2024-03-25 ENCOUNTER — LAB (OUTPATIENT)
Dept: LAB | Facility: CLINIC | Age: 69
End: 2024-03-25
Attending: INTERNAL MEDICINE
Payer: COMMERCIAL

## 2024-03-25 ENCOUNTER — PATIENT OUTREACH (OUTPATIENT)
Dept: CARE COORDINATION | Facility: CLINIC | Age: 69
End: 2024-03-25

## 2024-03-25 VITALS
HEART RATE: 73 BPM | OXYGEN SATURATION: 96 % | BODY MASS INDEX: 34.35 KG/M2 | DIASTOLIC BLOOD PRESSURE: 89 MMHG | SYSTOLIC BLOOD PRESSURE: 132 MMHG | WEIGHT: 212.8 LBS

## 2024-03-25 DIAGNOSIS — L40.50 PSORIATIC ARTHRITIS (H): ICD-10-CM

## 2024-03-25 DIAGNOSIS — I15.1 HTN, KIDNEY TRANSPLANT RELATED: ICD-10-CM

## 2024-03-25 DIAGNOSIS — G62.9 NEUROPATHY: ICD-10-CM

## 2024-03-25 DIAGNOSIS — D63.1 ANEMIA IN STAGE 2 CHRONIC KIDNEY DISEASE: ICD-10-CM

## 2024-03-25 DIAGNOSIS — Z94.0 HTN, KIDNEY TRANSPLANT RELATED: ICD-10-CM

## 2024-03-25 DIAGNOSIS — Z94.4 LIVER REPLACED BY TRANSPLANT (H): ICD-10-CM

## 2024-03-25 DIAGNOSIS — Z94.4 HISTORY OF LIVER TRANSPLANT (H): ICD-10-CM

## 2024-03-25 DIAGNOSIS — M1A.9XX1 TOPHACEOUS GOUT: ICD-10-CM

## 2024-03-25 DIAGNOSIS — I77.82 ANCA-ASSOCIATED VASCULITIS (H): ICD-10-CM

## 2024-03-25 DIAGNOSIS — Z94.4 LIVER TRANSPLANT RECIPIENT (H): ICD-10-CM

## 2024-03-25 DIAGNOSIS — Z94.0 KIDNEY REPLACED BY TRANSPLANT: ICD-10-CM

## 2024-03-25 DIAGNOSIS — D84.9 IMMUNOSUPPRESSED STATUS (H): ICD-10-CM

## 2024-03-25 DIAGNOSIS — K70.30 ALCOHOLIC CIRRHOSIS (H): ICD-10-CM

## 2024-03-25 DIAGNOSIS — Z94.0 HISTORY OF KIDNEY TRANSPLANT: ICD-10-CM

## 2024-03-25 DIAGNOSIS — N18.2 ANEMIA IN STAGE 2 CHRONIC KIDNEY DISEASE: ICD-10-CM

## 2024-03-25 DIAGNOSIS — Z94.0 KIDNEY REPLACED BY TRANSPLANT: Primary | ICD-10-CM

## 2024-03-25 DIAGNOSIS — Z48.298 AFTERCARE FOLLOWING ORGAN TRANSPLANT: ICD-10-CM

## 2024-03-25 PROBLEM — Z29.89 NEED FOR PNEUMOCYSTIS PROPHYLAXIS: Status: RESOLVED | Noted: 2023-12-28 | Resolved: 2024-03-25

## 2024-03-25 PROBLEM — Z98.890 S/P HERNIA REPAIR: Status: RESOLVED | Noted: 2023-12-08 | Resolved: 2024-03-25

## 2024-03-25 PROBLEM — T38.0X5A STEROID-INDUCED HYPERGLYCEMIA: Status: RESOLVED | Noted: 2023-06-23 | Resolved: 2024-03-25

## 2024-03-25 PROBLEM — R73.9 STEROID-INDUCED HYPERGLYCEMIA: Status: RESOLVED | Noted: 2023-06-23 | Resolved: 2024-03-25

## 2024-03-25 PROBLEM — Z87.19 S/P HERNIA REPAIR: Status: RESOLVED | Noted: 2023-12-08 | Resolved: 2024-03-25

## 2024-03-25 PROBLEM — E66.09 CLASS 1 OBESITY DUE TO EXCESS CALORIES WITHOUT SERIOUS COMORBIDITY WITH BODY MASS INDEX (BMI) OF 34.0 TO 34.9 IN ADULT: Status: ACTIVE | Noted: 2023-11-28

## 2024-03-25 PROBLEM — E66.811 CLASS 1 OBESITY DUE TO EXCESS CALORIES WITHOUT SERIOUS COMORBIDITY WITH BODY MASS INDEX (BMI) OF 34.0 TO 34.9 IN ADULT: Status: ACTIVE | Noted: 2023-11-28

## 2024-03-25 PROBLEM — B25.9 CYTOMEGALOVIRUS (CMV) VIREMIA (H): Status: RESOLVED | Noted: 2023-12-28 | Resolved: 2024-03-25

## 2024-03-25 LAB
ALBUMIN MFR UR ELPH: 16 MG/DL
ALBUMIN SERPL BCG-MCNC: 4 G/DL (ref 3.5–5.2)
ALBUMIN UR-MCNC: NEGATIVE MG/DL
ALP SERPL-CCNC: 165 U/L (ref 40–150)
ALT SERPL W P-5'-P-CCNC: 27 U/L (ref 0–70)
ANION GAP SERPL CALCULATED.3IONS-SCNC: 11 MMOL/L (ref 7–15)
APPEARANCE UR: CLEAR
AST SERPL W P-5'-P-CCNC: 23 U/L (ref 0–45)
BASOPHILS # BLD AUTO: 0 10E3/UL (ref 0–0.2)
BASOPHILS NFR BLD AUTO: 1 %
BILIRUB DIRECT SERPL-MCNC: <0.2 MG/DL (ref 0–0.3)
BILIRUB SERPL-MCNC: 0.4 MG/DL
BILIRUB UR QL STRIP: NEGATIVE
BUN SERPL-MCNC: 21.9 MG/DL (ref 8–23)
CALCIUM SERPL-MCNC: 9.4 MG/DL (ref 8.8–10.2)
CHLORIDE SERPL-SCNC: 102 MMOL/L (ref 98–107)
COLOR UR AUTO: YELLOW
CREAT SERPL-MCNC: 0.97 MG/DL (ref 0.67–1.17)
CREAT UR-MCNC: 120 MG/DL
DEPRECATED HCO3 PLAS-SCNC: 27 MMOL/L (ref 22–29)
EGFRCR SERPLBLD CKD-EPI 2021: 86 ML/MIN/1.73M2
EOSINOPHIL # BLD AUTO: 0.2 10E3/UL (ref 0–0.7)
EOSINOPHIL NFR BLD AUTO: 3 %
ERYTHROCYTE [DISTWIDTH] IN BLOOD BY AUTOMATED COUNT: 15.6 % (ref 10–15)
FERRITIN SERPL-MCNC: 363 NG/ML (ref 31–409)
GLUCOSE SERPL-MCNC: 94 MG/DL (ref 70–99)
GLUCOSE UR STRIP-MCNC: NEGATIVE MG/DL
HCT VFR BLD AUTO: 46.3 % (ref 40–53)
HGB BLD-MCNC: 14.7 G/DL (ref 13.3–17.7)
HGB UR QL STRIP: NEGATIVE
IMM GRANULOCYTES # BLD: 0.1 10E3/UL
IMM GRANULOCYTES NFR BLD: 1 %
IRON BINDING CAPACITY (ROCHE): 247 UG/DL (ref 240–430)
IRON SATN MFR SERPL: 17 % (ref 15–46)
IRON SERPL-MCNC: 41 UG/DL (ref 61–157)
KETONES UR STRIP-MCNC: NEGATIVE MG/DL
LEUKOCYTE ESTERASE UR QL STRIP: ABNORMAL
LYMPHOCYTES # BLD AUTO: 1.1 10E3/UL (ref 0.8–5.3)
LYMPHOCYTES NFR BLD AUTO: 18 %
MAGNESIUM SERPL-MCNC: 1.7 MG/DL (ref 1.7–2.3)
MCH RBC QN AUTO: 28.9 PG (ref 26.5–33)
MCHC RBC AUTO-ENTMCNC: 31.7 G/DL (ref 31.5–36.5)
MCV RBC AUTO: 91 FL (ref 78–100)
MONOCYTES # BLD AUTO: 0.5 10E3/UL (ref 0–1.3)
MONOCYTES NFR BLD AUTO: 8 %
NEUTROPHILS # BLD AUTO: 4.3 10E3/UL (ref 1.6–8.3)
NEUTROPHILS NFR BLD AUTO: 69 %
NITRATE UR QL: NEGATIVE
NRBC # BLD AUTO: 0 10E3/UL
NRBC BLD AUTO-RTO: 0 /100
PH UR STRIP: 6.5 [PH] (ref 5–7)
PHOSPHATE SERPL-MCNC: 3.6 MG/DL (ref 2.5–4.5)
PLATELET # BLD AUTO: 182 10E3/UL (ref 150–450)
POTASSIUM SERPL-SCNC: 4.2 MMOL/L (ref 3.4–5.3)
PROT SERPL-MCNC: 6.3 G/DL (ref 6.4–8.3)
PROT/CREAT 24H UR: 0.13 MG/MG CR (ref 0–0.2)
RBC # BLD AUTO: 5.08 10E6/UL (ref 4.4–5.9)
RBC URINE: 4 /HPF
SODIUM SERPL-SCNC: 140 MMOL/L (ref 135–145)
SP GR UR STRIP: 1.02 (ref 1–1.03)
SQUAMOUS EPITHELIAL: <1 /HPF
TACROLIMUS BLD-MCNC: 5 UG/L (ref 5–15)
TME LAST DOSE: NORMAL H
TME LAST DOSE: NORMAL H
URATE SERPL-MCNC: 4.4 MG/DL (ref 3.4–7)
UROBILINOGEN UR STRIP-MCNC: NORMAL MG/DL
WBC # BLD AUTO: 6.2 10E3/UL (ref 4–11)
WBC # BLD AUTO: 6.2 10E3/UL (ref 4–11)
WBC URINE: 21 /HPF

## 2024-03-25 PROCEDURE — 81001 URINALYSIS AUTO W/SCOPE: CPT | Mod: XU | Performed by: PATHOLOGY

## 2024-03-25 PROCEDURE — 82248 BILIRUBIN DIRECT: CPT | Performed by: PATHOLOGY

## 2024-03-25 PROCEDURE — 83550 IRON BINDING TEST: CPT | Performed by: PATHOLOGY

## 2024-03-25 PROCEDURE — 84156 ASSAY OF PROTEIN URINE: CPT | Mod: XU | Performed by: PATHOLOGY

## 2024-03-25 PROCEDURE — 36415 COLL VENOUS BLD VENIPUNCTURE: CPT | Performed by: PATHOLOGY

## 2024-03-25 PROCEDURE — G0480 DRUG TEST DEF 1-7 CLASSES: HCPCS | Mod: 90 | Performed by: PATHOLOGY

## 2024-03-25 PROCEDURE — 83540 ASSAY OF IRON: CPT | Performed by: PATHOLOGY

## 2024-03-25 PROCEDURE — 84100 ASSAY OF PHOSPHORUS: CPT | Performed by: PATHOLOGY

## 2024-03-25 PROCEDURE — 80053 COMPREHEN METABOLIC PANEL: CPT | Performed by: PATHOLOGY

## 2024-03-25 PROCEDURE — 85025 COMPLETE CBC W/AUTO DIFF WBC: CPT | Performed by: PATHOLOGY

## 2024-03-25 PROCEDURE — 83735 ASSAY OF MAGNESIUM: CPT | Performed by: PATHOLOGY

## 2024-03-25 PROCEDURE — G2211 COMPLEX E/M VISIT ADD ON: HCPCS | Performed by: INTERNAL MEDICINE

## 2024-03-25 PROCEDURE — 86833 HLA CLASS II HIGH DEFIN QUAL: CPT | Performed by: INTERNAL MEDICINE

## 2024-03-25 PROCEDURE — 83516 IMMUNOASSAY NONANTIBODY: CPT

## 2024-03-25 PROCEDURE — 86832 HLA CLASS I HIGH DEFIN QUAL: CPT | Performed by: INTERNAL MEDICINE

## 2024-03-25 PROCEDURE — 99000 SPECIMEN HANDLING OFFICE-LAB: CPT | Performed by: PATHOLOGY

## 2024-03-25 PROCEDURE — 80180 DRUG SCRN QUAN MYCOPHENOLATE: CPT | Performed by: INTERNAL MEDICINE

## 2024-03-25 PROCEDURE — 99214 OFFICE O/P EST MOD 30 MIN: CPT | Performed by: INTERNAL MEDICINE

## 2024-03-25 PROCEDURE — 82728 ASSAY OF FERRITIN: CPT | Performed by: PATHOLOGY

## 2024-03-25 PROCEDURE — 84550 ASSAY OF BLOOD/URIC ACID: CPT | Performed by: PATHOLOGY

## 2024-03-25 PROCEDURE — 80197 ASSAY OF TACROLIMUS: CPT | Performed by: INTERNAL MEDICINE

## 2024-03-25 PROCEDURE — 99213 OFFICE O/P EST LOW 20 MIN: CPT | Performed by: INTERNAL MEDICINE

## 2024-03-25 RX ORDER — GABAPENTIN 100 MG/1
CAPSULE ORAL
COMMUNITY
Start: 2024-03-25 | End: 2024-04-02

## 2024-03-25 RX ORDER — TACROLIMUS 0.5 MG/1
1 CAPSULE ORAL PRN
Qty: 60 CAPSULE | Refills: 3 | Status: SHIPPED | OUTPATIENT
Start: 2024-03-25 | End: 2024-03-26

## 2024-03-25 RX ORDER — CAPSAICIN 0.025 %
CREAM (GRAM) TOPICAL
COMMUNITY
Start: 2024-03-25 | End: 2024-04-01

## 2024-03-25 RX ORDER — TACROLIMUS 1 MG/1
1 CAPSULE ORAL 2 TIMES DAILY
Qty: 180 CAPSULE | Refills: 3 | Status: SHIPPED | OUTPATIENT
Start: 2024-03-25 | End: 2024-07-31

## 2024-03-25 RX ORDER — TACROLIMUS 0.5 MG/1
1 CAPSULE ORAL 2 TIMES DAILY
COMMUNITY
Start: 2024-03-25 | End: 2024-03-25

## 2024-03-25 RX ORDER — TACROLIMUS 1 MG/1
1 CAPSULE ORAL 2 TIMES DAILY
COMMUNITY
Start: 2024-03-25 | End: 2024-03-25

## 2024-03-25 ASSESSMENT — PAIN SCALES - GENERAL: PAINLEVEL: NO PAIN (0)

## 2024-03-25 NOTE — NURSING NOTE
Chief Complaint   Patient presents with    RECHECK     9 month follow up.      Vitals:    03/25/24 1328 03/25/24 1333 03/25/24 1334 03/25/24 1336   BP: 133/85 (!) 132/90 (!) 131/93 132/89   BP Location: Right arm Right arm Right arm    Patient Position: Sitting Sitting Sitting    Cuff Size: Adult Regular Adult Regular Adult Regular    Pulse: 73      SpO2: 96%      Weight: 96.5 kg (212 lb 12.8 oz)          BP Readings from Last 3 Encounters:   03/25/24 132/89   02/13/24 110/78   01/10/24 117/71       /89   Pulse 73   Wt 96.5 kg (212 lb 12.8 oz)   SpO2 96%   BMI 34.35 kg/m       Grace José

## 2024-03-25 NOTE — PATIENT INSTRUCTIONS
Patient Recommendations:  - I recommend the following vaccines: Shingrix and Hep B vaccination series. After April 28th you can get another COVID booster    Transplant Patient Information  Your Post Transplant Coordinator is: Angelita Torres  For non urgent items, we encourage you to contact your coordinator/care team online via [employer]  You and your care team can also contact your transplant coordinator Monday - Friday, 8am - 5pm at [phone] (Option 2 to reach the coordinator or Option 4 to schedule an appointment).  After hours for urgent matters, please call [facility] at [phone].

## 2024-03-25 NOTE — PROGRESS NOTES
Anemia Management Note - Follow Up      SUBJECTIVE/OBJECTIVE:    Referred by Dr. Subhash Dutta on 2023  Primary Diagnosis: Anemia of Other Chronic Illness (D63.8)     Secondary Diagnosis:  Organ or tissue replaced by transplant, kidney (Z94.0)  Kidney Tx: 2023  Hgb goal range:  9-10  Epo/Darbo: Aranesp  40 mcg  every two weeks for Hgb <10.0. In clinic  *If Aranesp is ordered dose at 0.45mcg/kg  Iron regimen:  NA  Injectafer completed [id number]     Labs : 2024  Recent FEDERICO use, transfusion, IV iron: Mircera   RX/TX plans : 2024     *[facility]; [phone]      Hx of DVT (2023), Afib, anticoagulate.      Consent to Communicate:  OK to speak with Nabila Quinones (wife) per consent to communicate dated 10/27/2022. No Boxes Checked on Consent to Communicate.         Latest Ref Rng & Units 2023 2024 2024 2024 2024 2024 3/25/2024   Anemia   Hemoglobin 13.3 - 17.7 g/dL 10.6  10.4  11.3   11.9  12.1  14.7    IV Iron Dose     750mg      TSAT 15 - 46 % 19      24  17    Ferritin 31 - 409 ng/mL 1,832      807  363      BP Readings from Last 3 Encounters:   24 132/89   24 110/78   01/10/24 117/71     Wt Readings from Last 2 Encounters:   24 96.5 kg (212 lb 12.8 oz)   24 99.3 kg (219 lb)           ASSESSMENT:    Hgb:at goal - continue to monitor  TSat: not at goal of >30% Ferritin: At goal (>100ng/mL)  Hgb >12.0, No IV Iron needed.       PLAN:  RTC for Anemia labs in 4 week(s). If labs are stable ok to close Anemia Services.    Orders needed to be renewed (for next follow-up date) in EPIC: None    Iron labs due:  End of 2024    Plan discussed with:  No Call, chart review       NEXT FOLLOW-UP DATE:  24    Molly Fenton RN   Anemia Services  [facility]  [email or web address]   Office : [phone]  Fax: [phone]

## 2024-03-25 NOTE — LETTER
3/25/2024         RE: Damion Quinones   [address] [facility] [address]        Dear Colleague,    Thank you for referring your patient, Damion Quinones, to the [facility]. Please see a copy of my visit note below.    TRANSPLANT NEPHROLOGY CHRONIC POST TRANSPLANT VISIT    Assessment & Plan    # DDKT (SLK): Stable   - Baseline Creatinine: ~ 0.8-1.0   - Proteinuria: Normal (<0.2 grams)   - Date DSA Last Checked: Feb/2024      Latest DSA: Low level DSA (<1000 mfi) to Cw9 . Present at time of txp   - BK Viremia: No   - Kidney Tx Biopsy: Jun 20, 2023; Result: No diagnostic evidence of acute rejection.  Focal acute tubular injury.  Mild arterial sclerosis.     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  Transaminases Stable.  Followed by Hepatology.     # Immunosuppression: Tacrolimus immediate release (goal 6-8), Mycophenolic acid (dose 540 mg every 12 hours), and Prednisone (dose 5 mg daily)   - Continue with intensive monitoring of immunosuppression for efficacy and toxicity.   - Changes: Not at this time    # Infection Prophylaxis:   - PJP: Sulfa/TMP (Bactrim)    # Hypertension: Controlled  Goal BP: < 130/80    - Changes: No. Contniue lasix 20mg daily and metoprolol 50mg bid    # Elevated Blood Glucose: Glucose generally running ~ 110-150s Last HbA1c: 6.4%   - Management as per primary care.    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  Now off Valcyte.    # CAD, s/p PCI: Asymptomatic.    # Paroxysmal Atrial Fibrillation:   No palpitations on a beta blocker.  Remains on chronic anticoagulation with apixaban.    # Neuropathy:   -Followed by Neurology   -EMG showed moderate axonal, length dependent sensorimotor polyneuropathy.    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  No evidence of recurrence.    # Obesity, Class II (BMI = 34.45kg/m3): Stable weight.   - Recommend weight loss for overall health by increasing exercise and watching caloric intake.    # GERD: Mostly controlled with PPI, but also use of simethicone.    # Gout:    -Improved. He has been on allopurinol 400mg daily for the past 6 weeks and remains on low dose prednisone 5mg daily.  Stable symptoms. Uric acid 4.4 today   -Anakinra PRN. He uses it about every 5 days.     # H/o Clostridium difficile Colitis: Patient was positive during last hospitalization and treated with oral vancomycin.  No diarrhea symptoms at present.     # Umbilical Hernia Repair: Patient is s/p redo mesh repair of umbilical hernia and minimal DHEERAJ of small bowel 12/7/23. Patient then represented 12/8/23 from home with severe LLQ abdominal pain and N/V 12/8/23.  Back to OR 12/10 and found to have bowel leak and repaired.      # Medical Compliance: Yes     # Skin Cancer Risk:    - Discussed sun protection and recommend regular follow up with Dermatology.    # Health Maintenance and Vaccination Review: Recommend:  After 4/28/24 can get another COVID booster, Shingrix.  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    Return visit: Return in 3 months (on 6/25/2024) for previously scheduled visit.    Subhash Dutta MD    The longitudinal plan of care for kidney transplant was addressed during this visit. Due to the added complexity in care, I will continue to support Damion Quinones in the subsequent management of this condition(s) and with the ongoing continuity of care of this condition(s).       Chief Complaint  Mr. Quinones is a 68 year old here for kidney transplant, immunosuppression management and hospital follow up.     History of Present Illness  The patient overall feels well. he denies any recent hospitalizations. he denies nausea, vomiting, diarrhea, fever, chills, shortness of breath, chest pain, LE edema, unintentional weight loss, nights sweats, dysuria, hematuria. He was diagnosed with RSV on 2/13/24. He felt better after about 2 weeks.     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)     HTN, kidney transplant related     Hereditary hemochromatosis (H24)     Dyslipidemia     Tophaceous gout     Deep vein thrombosis (DVT) of non-extremity vein, unspecified chronicity     CKD (chronic kidney disease) stage 2, GFR 60-89 ml/min     Paroxysmal atrial fibrillation (H)     Liver replaced by transplant (H)     Immunosuppressed status (H24)     Kidney replaced by transplant     CAD (coronary artery disease)     Steroid-induced hyperglycemia     Aftercare following organ transplant     HCC (hepatocellular carcinoma) (H)     Peripheral neuropathy     Anemia in chronic renal disease     Class 2 severe obesity due to excess calories with serious comorbidity in adult (H)     S/P hernia repair     C. difficile colitis     Hypothyroidism      Hypomagnesemia     Need for pneumocystis prophylaxis     Cytomegalovirus (CMV) viremia (H)     GERD (gastroesophageal reflux disease)       Allergies  No Known Allergies    Medications  Current Outpatient Medications   Medication Sig     capsaicin (ZOSTRIX) 0.025 % external cream Apply to feet two times per day     gabapentin (NEURONTIN) 100 MG capsule 100 mg in am, 100 mg in the afternoon and 200 mg at bedtime by mouth daily     tacrolimus (GENERIC EQUIVALENT) 0.5 MG capsule Take 2 capsules (1 mg) by mouth 2 times daily Total dose: 1mg BID     tacrolimus (GENERIC EQUIVALENT) 1 MG capsule Take 1 capsule (1 mg) by mouth 2 times daily Total dose: 1mg BID     Alcohol Swabs PADS Use to swab the area of the injection or quyen as directed Per insurance coverage     allopurinol (ZYLOPRIM) 300 MG tablet Take 400 tablets by mouth daily     anakinra (KINERET) 100 MG/0.67ML SOSY injection Inject 0.67 mLs (100 mg) Subcutaneous daily as needed     apixaban ANTICOAGULANT (ELIQUIS ANTICOAGULANT) 5 MG tablet Take 1 tablet (5 mg) by mouth 2 times daily     atorvastatin (LIPITOR) 40 MG tablet Take 1 tablet (40 mg) by mouth every evening     Blood Glucose Monitoring Suppl (ONETOUCH VERIO REFLECT) w/Device KIT      clotrimazole (LOTRIMIN) 1 % external cream Apply topically 2 times daily For feet     furosemide (LASIX) 20 MG tablet Take 1 tablet (20 mg) by mouth daily     levothyroxine (SYNTHROID/LEVOTHROID) 88 MCG tablet Take 1 tablet (88 mcg) by mouth daily Takes in the middle of the night daily     magnesium oxide (MAG-OX) 400 MG tablet Take 2 tablets (800 mg) by mouth 2 times daily     metoprolol tartrate (LOPRESSOR) 50 MG tablet Take 1 tablet (50 mg) by mouth 2 times daily for 90 days     multivitamin w/minerals (THERA-VIT-M) tablet Take 1 tablet by mouth daily     mupirocin (BACTROBAN) 2 % external ointment Apply topically 2 times daily     MYFORTIC (BRAND) 180 MG EC tablet Take 3 tablets (540 mg) by mouth 2 times daily      pantoprazole (PROTONIX) 20 MG EC tablet Take 1 tablet (20 mg) by mouth every morning (before breakfast)     predniSONE (DELTASONE) 5 MG tablet Take 1 tablet (5 mg) by mouth daily     Sharps Container MISC Use as directed to dispose of needles, lancets and other sharps     simethicone (MYLICON) 125 MG chewable tablet Take 125 mg by mouth 4 times daily as needed for intestinal gas     sulfamethoxazole-trimethoprim (BACTRIM) 400-80 MG tablet Take 1 tablet by mouth daily     tadalafil (CIALIS) 5 MG tablet Take 1 tablet (5 mg) by mouth daily as needed (take 1-2 tablets at least 30 minutes prior to sexual activity.)     triamcinolone (KENALOG) 0.1 % external ointment Apply topically 2 times daily     ursodiol (ACTIGALL) 250 MG tablet Take 1 tablet (250 mg) by mouth 2 times daily     No current facility-administered medications for this visit.     Medications Discontinued During This Encounter   Medication Reason     capsaicin (ZOSTRIX) 0.025 % external cream      gabapentin (NEURONTIN) 100 MG capsule      mometasone (ELOCON) 0.1 % external ointment      tacrolimus (GENERIC) 0.5 MG capsule      tacrolimus (GENERIC) 1 MG capsule      triamcinolone (KENALOG) 0.1 % external ointment          Physical Exam  Vital Signs: /89   Pulse 73   Wt 96.5 kg (212 lb 12.8 oz)   SpO2 96%   BMI 34.35 kg/m      GENERAL APPEARANCE: alert and no distress  HENT: mouth without ulcers or lesions  RESP: lungs clear to auscultation - no rales, rhonchi or wheezes  CV: regular rhythm, normal rate, no rub, no murmur  EDEMA: trace to 1+ LE edema bilaterally  ABDOMEN: soft, nondistended, nontender, bowel sounds normal, obese; midline incision bandaged.  MS: extremities normal - no gross deformities noted, no evidence of inflammation in joints, no muscle tenderness  SKIN: no rash  TX KIDNEY: normal  DIALYSIS ACCESS:  None    Data        Latest Ref Rng & Units 3/5/2024     7:47 AM 2/2/2024     8:13 AM 1/16/2024     8:14 AM   Renal   Sodium 135 -  145 mmol/L  138  137    Na (external) 134 - 144 mmol/L 140      K 3.4 - 5.3 mmol/L  4.2  4.2    K (external) 3.5 - 5.2 mmol/L 4.6      Cl 96 - 106 mmol/L 101  102  101    Cl (external) 96 - 106 mmol/L 101  102  101    CO2 22 - 29 mmol/L  27  24    CO2 (external) 20 - 29 mmol/L 26      Urea Nitrogen 8.0 - 23.0 mg/dL  11.8  22.2    BUN (external) 8 - 27 mg/dL 16      Creatinine 0.67 - 1.17 mg/dL  0.98  1.25    Cr (external) 0.76 - 1.27 mg/dL 0.79      Glucose 70 - 99 mg/dL  101  106    Glucose (external) 70 - 99 mg/dL 91      Calcium 8.8 - 10.2 mg/dL  9.3  9.6    Ca (external) 8.6 - 10.2 mg/dL 9.5      Magnesium 1.7 - 2.3 mg/dL  1.9  1.5    Mg (external) 1.6 - 2.3 mg/dL 1.6            Latest Ref Rng & Units 3/5/2024     7:47 AM 2/2/2024     8:13 AM 1/16/2024     8:14 AM   Bone Health   Phosphorus 2.5 - 4.5 mg/dL  3.7  3.0    Phos (external) 2.8 - 4.1 mg/dL 3.9            Latest Ref Rng & Units 3/5/2024     7:47 AM 2/2/2024     8:13 AM 1/16/2024     8:14 AM   Heme   WBC 4.0 - 11.0 10e3/uL  4.0 - 11.0 10e3/uL  3.6     3.6  5.7    WBC (external) 3.4 - 10.8 x10e3/uL 4.0      Hgb 13.3 - 17.7 g/dL  12.1  11.9    Hgb (external) 13.0 - 17.7 g/dL 14.2      Plt 150 - 450 10e3/uL  185  217    Plt (external) 150 - 450 x10e3/uL 225      ABSOLUTE NEUTROPHILS (EXTERNAL) 1.4 - 7.0 x10e3/uL 2.4         ABSOLUTE LYMPHOCYTES (EXTERNAL) 0.7 - 3.1 x10e3/uL 1.1         ABSOLUTE MONOCYTES (EXTERNAL) 0.1 - 0.9 x10e3/uL 0.3         ABSOLUTE EOSINOPHILS (EXTERNAL) 0.0 - 0.4 x10e3/uL 0.1         ABSOLUTE BASOPHILS (EXTERNAL) 0.0 - 0.2 x10e3/uL 0.0             This result is from an external source.    Multiple values from one day are sorted in reverse-chronological order         Latest Ref Rng & Units 3/5/2024     7:47 AM 2/2/2024     8:13 AM 1/16/2024     8:14 AM   Liver   AP 40 - 150 U/L  155  232    AP (external) 44 - 121 IU/L 170      TBili <=1.2 mg/dL  0.5  0.4    TBili (external) 0.0 - 1.2 mg/dL 0.4      Bilirubin Direct 0.00 - 0.30 mg/dL   0.22  <0.20    DBili (external) 0.00 - 0.40 mg/dL 0.16      ALT 0 - 70 U/L  14  32    ALT (external) 0 - 44 IU/L 20      AST 0 - 45 U/L  19  28    AST (external) 0 - 40 IU/L 16      Tot Protein 6.4 - 8.3 g/dL  6.2  6.3    Tot Protein (external) 6.0 - 8.5 g/dL 5.7      Albumin 3.5 - 5.2 g/dL  3.8  3.7    Albumin (external) 3.9 - 4.9 g/dL 3.9            Latest Ref Rng & Units 12/28/2023    11:54 AM 9/20/2023     4:11 PM 6/7/2023     3:30 AM   Pancreas   A1C <5.7 % 6.4  5.2     Amylase 28 - 100 U/L   90    Lipase (Roche) 13 - 60 U/L   59          Latest Ref Rng & Units 2/2/2024     8:13 AM 12/28/2023     1:38 PM 11/21/2023     7:42 AM   Iron studies   Iron 61 - 157 ug/dL 50  33  60    Iron Sat Index 15 - 46 % 24  19  22    Ferritin 31 - 409 ng/mL 807  1,832  204          Latest Ref Rng & Units 11/21/2023     7:42 AM 10/27/2023     8:21 AM 10/24/2023     8:06 AM   [facility] Txp Virology   LOG IU/ML OF CMVQNT  1.7      BK Quant Log Ext log IU/mL  Undetected  Undetected    BK Quant Result Ext Undetected IU/mL  Undetected  Undetected    BK Quant Spec Ext    Plasma         Recent Labs   Lab Test 01/08/24  0906 01/16/24  0814 02/02/24  0813 02/12/24  0754   DOSTAC 1/7/2024 1/15/2024  --  2/11/2024   TACROL 10.0 6.8 3.3* 5.9     Recent Labs   Lab Test 11/21/23  0742 12/28/23  1154 02/02/24  0813   DOSMPA  --  12/27/2023   9:00 PM 2/1/2024   8:00 PM   MPACID 1.08 <0.25* 1.35   MPAG 55.1 60.9 62.7         Again, thank you for allowing me to participate in the care of your patient.        Sincerely,        Subhash Dutta MD

## 2024-03-25 NOTE — PROGRESS NOTES
TRANSPLANT NEPHROLOGY CHRONIC POST TRANSPLANT VISIT    Assessment & Plan     # DDKT (SLK): Stable   - Baseline Creatinine: ~ 0.8-1.0   - Proteinuria: Normal (<0.2 grams)   - Date DSA Last Checked: Feb/2024      Latest DSA: Low level DSA (<1000 mfi) to Cw9 . Present at time of txp   - BK Viremia: No   - Kidney Tx Biopsy: Jun 20, 2023; Result: No diagnostic evidence of acute rejection.  Focal acute tubular injury.  Mild arterial sclerosis.     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  Transaminases Stable.  Followed by Hepatology.     # Immunosuppression: Tacrolimus immediate release (goal 6-8), Mycophenolic acid (dose 540 mg every 12 hours), and Prednisone (dose 5 mg daily)   - Continue with intensive monitoring of immunosuppression for efficacy and toxicity.   - Changes: Not at this time    # Infection Prophylaxis:   - PJP: Sulfa/TMP (Bactrim)    # Hypertension: Controlled  Goal BP: < 130/80    - Changes: No. Contniue lasix 20mg daily and metoprolol 50mg bid    # Elevated Blood Glucose: Glucose generally running ~ 110-150s Last HbA1c: 6.4%   - Management as per primary care.    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  Now off Valcyte.    # CAD, s/p PCI: Asymptomatic.    # Paroxysmal Atrial Fibrillation:   No palpitations on a beta blocker.  Remains on chronic anticoagulation with apixaban.    #  Neuropathy:   -Followed by Neurology   -EMG showed moderate axonal, length dependent sensorimotor polyneuropathy.    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  No evidence of recurrence.    # Obesity, Class II (BMI = 34.45kg/m3): Stable weight.   - Recommend weight loss for overall health by increasing exercise and watching caloric intake.    # GERD: Mostly controlled with PPI, but also use of simethicone.    # Gout:    -Improved. He has been on allopurinol 400mg daily for the past 6 weeks and remains on low dose prednisone 5mg daily.  Stable symptoms. Uric acid 4.4 today   -Anakinra PRN. He uses it about every 5 days.     # H/o Clostridium difficile Colitis: Patient was positive during last hospitalization and treated with oral vancomycin.  No diarrhea symptoms at present.     # Umbilical Hernia Repair: Patient is s/p redo mesh repair of umbilical hernia and minimal DHEERAJ of small bowel 12/7/23. Patient then represented 12/8/23 from home with severe LLQ abdominal pain and N/V 12/8/23.  Back to OR 12/10 and found to have bowel leak and repaired.      # Medical Compliance: Yes     # Skin Cancer Risk:    - Discussed sun protection and recommend regular follow up with Dermatology.    # Health Maintenance and Vaccination Review: Recommend:  After 4/28/24 can get another COVID booster, Shingrix.  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    Return visit: Return in 3 months (on 6/25/2024) for previously scheduled visit.    Subhash Dutta MD    The longitudinal plan of  care for kidney transplant was addressed during this visit. Due to the added complexity in care, I will continue to support Damion Quinones in the subsequent management of this condition(s) and with the ongoing continuity of care of this condition(s).       Chief Complaint   Mr. Quinones is a 68 year old here for kidney transplant, immunosuppression management and hospital follow up.     History of Present Illness   The patient overall feels well. he denies any recent hospitalizations. he denies nausea, vomiting, diarrhea, fever, chills, shortness of breath, chest pain, LE edema, unintentional weight loss, nights sweats, dysuria, hematuria. He was diagnosed with RSV on 2/13/24. He felt better after about 2 weeks.     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)    HTN, kidney transplant related    Hereditary hemochromatosis (H24)    Dyslipidemia    Tophaceous gout    Deep vein thrombosis (DVT) of non-extremity vein, unspecified chronicity    CKD (chronic kidney disease) stage 2, GFR 60-89 ml/min    Paroxysmal atrial fibrillation (H)    Liver replaced by transplant (H)    Immunosuppressed status (H24)    Kidney replaced by transplant    CAD (coronary artery disease)    Steroid-induced hyperglycemia    Aftercare following organ transplant    HCC (hepatocellular carcinoma) (H)    Peripheral neuropathy    Anemia in chronic renal disease    Class 2 severe obesity due to excess calories with serious comorbidity in adult (H)    S/P hernia repair    C. difficile colitis    Hypothyroidism    Hypomagnesemia    Need for pneumocystis prophylaxis    Cytomegalovirus (CMV) viremia (H)    GERD (gastroesophageal reflux disease)       Allergies   No Known Allergies    Medications   Current Outpatient Medications   Medication Sig    capsaicin (ZOSTRIX) 0.025 % external cream Apply  to feet two times per day    gabapentin (NEURONTIN) 100 MG capsule 100 mg in am, 100 mg in the afternoon and 200 mg at bedtime by mouth daily    tacrolimus (GENERIC EQUIVALENT) 0.5 MG capsule Take 2 capsules (1 mg) by mouth 2 times daily Total dose: 1mg BID    tacrolimus (GENERIC EQUIVALENT) 1 MG capsule Take 1 capsule (1 mg) by mouth 2 times daily Total dose: 1mg BID    Alcohol Swabs PADS Use to swab the area of the injection or quyen as directed Per insurance coverage    allopurinol (ZYLOPRIM) 300 MG tablet Take 400 tablets by mouth daily    anakinra (KINERET) 100 MG/0.67ML SOSY injection Inject 0.67 mLs (100 mg) Subcutaneous daily as needed    apixaban ANTICOAGULANT (ELIQUIS ANTICOAGULANT) 5 MG tablet Take 1 tablet (5 mg) by mouth 2 times daily    atorvastatin (LIPITOR) 40 MG tablet Take 1 tablet (40 mg) by mouth every evening    Blood Glucose Monitoring Suppl (ONETOUCH VERIO REFLECT) w/Device KIT     clotrimazole (LOTRIMIN) 1 % external cream Apply topically 2 times daily For feet    furosemide (LASIX) 20 MG tablet Take 1 tablet (20 mg) by mouth daily    levothyroxine (SYNTHROID/LEVOTHROID) 88 MCG tablet Take 1 tablet (88 mcg) by mouth daily Takes in the middle of the night daily    magnesium oxide (MAG-OX) 400 MG tablet Take 2 tablets (800 mg) by mouth 2 times daily    metoprolol tartrate (LOPRESSOR) 50 MG tablet Take 1 tablet (50 mg) by mouth 2 times daily for 90 days    multivitamin w/minerals (THERA-VIT-M) tablet Take 1 tablet by mouth daily    mupirocin (BACTROBAN) 2 % external ointment Apply topically 2 times daily    MYFORTIC (BRAND) 180 MG EC tablet Take 3 tablets (540 mg) by mouth 2 times daily    pantoprazole (PROTONIX) 20 MG EC tablet Take 1 tablet (20 mg) by mouth every morning (before breakfast)    predniSONE (DELTASONE) 5 MG tablet Take 1 tablet (5 mg) by mouth daily    Sharps Container MISC Use as directed to dispose of needles, lancets and other sharps    simethicone (MYLICON) 125 MG chewable  tablet Take 125 mg by mouth 4 times daily as needed for intestinal gas    sulfamethoxazole-trimethoprim (BACTRIM) 400-80 MG tablet Take 1 tablet by mouth daily    tadalafil (CIALIS) 5 MG tablet Take 1 tablet (5 mg) by mouth daily as needed (take 1-2 tablets at least 30 minutes prior to sexual activity.)    triamcinolone (KENALOG) 0.1 % external ointment Apply topically 2 times daily    ursodiol (ACTIGALL) 250 MG tablet Take 1 tablet (250 mg) by mouth 2 times daily     No current facility-administered medications for this visit.     Medications Discontinued During This Encounter   Medication Reason    capsaicin (ZOSTRIX) 0.025 % external cream     gabapentin (NEURONTIN) 100 MG capsule     mometasone (ELOCON) 0.1 % external ointment     tacrolimus (GENERIC) 0.5 MG capsule     tacrolimus (GENERIC) 1 MG capsule     triamcinolone (KENALOG) 0.1 % external ointment          Physical Exam   Vital Signs: /89   Pulse 73   Wt 96.5 kg (212 lb 12.8 oz)   SpO2 96%   BMI 34.35 kg/m      GENERAL APPEARANCE: alert and no distress  HENT: mouth without ulcers or lesions  RESP: lungs clear to auscultation - no rales, rhonchi or wheezes  CV: regular rhythm, normal rate, no rub, no murmur  EDEMA: trace to 1+ LE edema bilaterally  ABDOMEN: soft, nondistended, nontender, bowel sounds normal, obese; midline incision bandaged.  MS: extremities normal - no gross deformities noted, no evidence of inflammation in joints, no muscle tenderness  SKIN: no rash  TX KIDNEY: normal  DIALYSIS ACCESS:  None    Data         Latest Ref Rng & Units 3/5/2024     7:47 AM 2/2/2024     8:13 AM 1/16/2024     8:14 AM   Renal   Sodium 135 - 145 mmol/L  138  137    Na (external) 134 - 144 mmol/L 140      K 3.4 - 5.3 mmol/L  4.2  4.2    K (external) 3.5 - 5.2 mmol/L 4.6      Cl 96 - 106 mmol/L 101  102  101    Cl (external) 96 - 106 mmol/L 101  102  101    CO2 22 - 29 mmol/L  27  24    CO2 (external) 20 - 29 mmol/L 26      Urea Nitrogen 8.0 - 23.0 mg/dL   11.8  22.2    BUN (external) 8 - 27 mg/dL 16      Creatinine 0.67 - 1.17 mg/dL  0.98  1.25    Cr (external) 0.76 - 1.27 mg/dL 0.79      Glucose 70 - 99 mg/dL  101  106    Glucose (external) 70 - 99 mg/dL 91      Calcium 8.8 - 10.2 mg/dL  9.3  9.6    Ca (external) 8.6 - 10.2 mg/dL 9.5      Magnesium 1.7 - 2.3 mg/dL  1.9  1.5    Mg (external) 1.6 - 2.3 mg/dL 1.6            Latest Ref Rng & Units 3/5/2024     7:47 AM 2/2/2024     8:13 AM 1/16/2024     8:14 AM   Bone Health   Phosphorus 2.5 - 4.5 mg/dL  3.7  3.0    Phos (external) 2.8 - 4.1 mg/dL 3.9            Latest Ref Rng & Units 3/5/2024     7:47 AM 2/2/2024     8:13 AM 1/16/2024     8:14 AM   Heme   WBC 4.0 - 11.0 10e3/uL  4.0 - 11.0 10e3/uL  3.6     3.6  5.7    WBC (external) 3.4 - 10.8 x10e3/uL 4.0      Hgb 13.3 - 17.7 g/dL  12.1  11.9    Hgb (external) 13.0 - 17.7 g/dL 14.2      Plt 150 - 450 10e3/uL  185  217    Plt (external) 150 - 450 x10e3/uL 225      ABSOLUTE NEUTROPHILS (EXTERNAL) 1.4 - 7.0 x10e3/uL 2.4         ABSOLUTE LYMPHOCYTES (EXTERNAL) 0.7 - 3.1 x10e3/uL 1.1         ABSOLUTE MONOCYTES (EXTERNAL) 0.1 - 0.9 x10e3/uL 0.3         ABSOLUTE EOSINOPHILS (EXTERNAL) 0.0 - 0.4 x10e3/uL 0.1         ABSOLUTE BASOPHILS (EXTERNAL) 0.0 - 0.2 x10e3/uL 0.0             This result is from an external source.    Multiple values from one day are sorted in reverse-chronological order         Latest Ref Rng & Units 3/5/2024     7:47 AM 2/2/2024     8:13 AM 1/16/2024     8:14 AM   Liver   AP 40 - 150 U/L  155  232    AP (external) 44 - 121 IU/L 170      TBili <=1.2 mg/dL  0.5  0.4    TBili (external) 0.0 - 1.2 mg/dL 0.4      Bilirubin Direct 0.00 - 0.30 mg/dL  0.22  <0.20    DBili (external) 0.00 - 0.40 mg/dL 0.16      ALT 0 - 70 U/L  14  32    ALT (external) 0 - 44 IU/L 20      AST 0 - 45 U/L  19  28    AST (external) 0 - 40 IU/L 16      Tot Protein 6.4 - 8.3 g/dL  6.2  6.3    Tot Protein (external) 6.0 - 8.5 g/dL 5.7      Albumin 3.5 - 5.2 g/dL  3.8  3.7    Albumin  (external) 3.9 - 4.9 g/dL 3.9            Latest Ref Rng & Units 12/28/2023    11:54 AM 9/20/2023     4:11 PM 6/7/2023     3:30 AM   Pancreas   A1C <5.7 % 6.4  5.2     Amylase 28 - 100 U/L   90    Lipase (Roche) 13 - 60 U/L   59          Latest Ref Rng & Units 2/2/2024     8:13 AM 12/28/2023     1:38 PM 11/21/2023     7:42 AM   Iron studies   Iron 61 - 157 ug/dL 50  33  60    Iron Sat Index 15 - 46 % 24  19  22    Ferritin 31 - 409 ng/mL 807  1,832  204          Latest Ref Rng & Units 11/21/2023     7:42 AM 10/27/2023     8:21 AM 10/24/2023     8:06 AM   [facility] Virology   LOG IU/ML OF CMVQNT  1.7      BK Quant Log Ext log IU/mL  Undetected  Undetected    BK Quant Result Ext Undetected IU/mL  Undetected  Undetected    BK Quant Spec Ext    Plasma         Recent Labs   Lab Test 01/08/24  0906 01/16/24  0814 02/02/24  0813 02/12/24  0754   DOSTAC 1/7/2024 1/15/2024  --  2/11/2024   TACROL 10.0 6.8 3.3* 5.9     Recent Labs   Lab Test 11/21/23  0742 12/28/23  1154 02/02/24  0813   DOSMPA  --  12/27/2023   9:00 PM 2/1/2024   8:00 PM   MPACID 1.08 <0.25* 1.35   MPAG 55.1 60.9 62.7

## 2024-03-26 ENCOUNTER — TELEPHONE (OUTPATIENT)
Dept: TRANSPLANT | Facility: CLINIC | Age: 69
End: 2024-03-26
Payer: MEDICARE

## 2024-03-26 DIAGNOSIS — Z94.4 LIVER REPLACED BY TRANSPLANT (H): ICD-10-CM

## 2024-03-26 DIAGNOSIS — Z94.0 KIDNEY REPLACED BY TRANSPLANT: Primary | ICD-10-CM

## 2024-03-26 DIAGNOSIS — Z94.0 KIDNEY REPLACED BY TRANSPLANT: ICD-10-CM

## 2024-03-26 LAB
MYCOPHENOLATE SERPL LC/MS/MS-MCNC: 1.76 MG/L (ref 1–3.5)
MYCOPHENOLATE-G SERPL LC/MS/MS-MCNC: 41.9 MG/L (ref 30–95)
MYELOPEROXIDASE AB SER IA-ACNC: 0.4 U/ML
MYELOPEROXIDASE AB SER IA-ACNC: NEGATIVE
PROTEINASE3 AB SER IA-ACNC: <1 U/ML
PROTEINASE3 AB SER IA-ACNC: NEGATIVE
TME LAST DOSE: NORMAL H
TME LAST DOSE: NORMAL H

## 2024-03-26 RX ORDER — TACROLIMUS 1 MG/1
1 CAPSULE ORAL 2 TIMES DAILY
Qty: 180 CAPSULE | Refills: 3 | OUTPATIENT
Start: 2024-03-26

## 2024-03-26 RX ORDER — TACROLIMUS 0.5 MG/1
0.5 CAPSULE ORAL 2 TIMES DAILY
Qty: 180 CAPSULE | Refills: 3 | Status: SHIPPED | OUTPATIENT
Start: 2024-03-26 | End: 2024-07-23 | Stop reason: DRUGHIGH

## 2024-03-26 RX ORDER — FUROSEMIDE 20 MG
20 TABLET ORAL DAILY
Qty: 90 TABLET | Refills: 3 | Status: SHIPPED | OUTPATIENT
Start: 2024-03-26

## 2024-03-26 NOTE — TELEPHONE ENCOUNTER
ISSUE:   Tacrolimus IR level 5 on 3/25, goal 6-8, dose 1 mg BID.    PLAN:   Call Patientand confirm this was an accurate 12-hour trough.   Verify Tacrolimus IR dose 1 mg BID.   Confirm no new medications or illness.   Confirm no missed doses.   If accurate trough and accurate dose, increase Tacrolimus IR dose to 1.5 mg BID     Is this more than a 50% increase or decrease in current IS dose: No      Repeat labs in 2 weeks.      OUTCOME:   Spoke with Patient, they confirm accurate trough level and current dose 1 mg BID.   Patientconfirmed dose change to 1.5 mg BID.  Patientagreed to repeat labs in 2 weeks.   Orders sent to preferred pharmacy for dose change and lab for repeat labs.   Patientvoiced understanding of plan.

## 2024-03-27 ENCOUNTER — OFFICE VISIT (OUTPATIENT)
Dept: FAMILY MEDICINE | Facility: CLINIC | Age: 69
End: 2024-03-27
Attending: NURSE PRACTITIONER
Payer: COMMERCIAL

## 2024-03-27 VITALS
WEIGHT: 218.8 LBS | HEART RATE: 87 BPM | SYSTOLIC BLOOD PRESSURE: 108 MMHG | OXYGEN SATURATION: 98 % | DIASTOLIC BLOOD PRESSURE: 62 MMHG | BODY MASS INDEX: 35.32 KG/M2 | RESPIRATION RATE: 14 BRPM

## 2024-03-27 DIAGNOSIS — E66.01 CLASS 2 SEVERE OBESITY DUE TO EXCESS CALORIES WITH SERIOUS COMORBIDITY AND BODY MASS INDEX (BMI) OF 37.0 TO 37.9 IN ADULT (H): ICD-10-CM

## 2024-03-27 DIAGNOSIS — E11.22 TYPE 2 DIABETES MELLITUS WITH STAGE 3 CHRONIC KIDNEY DISEASE, WITHOUT LONG-TERM CURRENT USE OF INSULIN, UNSPECIFIED WHETHER STAGE 3A OR 3B CKD (H): ICD-10-CM

## 2024-03-27 DIAGNOSIS — E66.812 CLASS 2 SEVERE OBESITY DUE TO EXCESS CALORIES WITH SERIOUS COMORBIDITY AND BODY MASS INDEX (BMI) OF 37.0 TO 37.9 IN ADULT (H): ICD-10-CM

## 2024-03-27 DIAGNOSIS — N08 GLOMERULONEPHRITIS DUE TO ANTINEUTROPHIL CYTOPLASMIC ANTIBODY (ANCA) POSITIVE VASCULITIS (H): ICD-10-CM

## 2024-03-27 DIAGNOSIS — I77.82 GLOMERULONEPHRITIS DUE TO ANTINEUTROPHIL CYTOPLASMIC ANTIBODY (ANCA) POSITIVE VASCULITIS (H): ICD-10-CM

## 2024-03-27 DIAGNOSIS — I48.0 PAROXYSMAL ATRIAL FIBRILLATION (H): ICD-10-CM

## 2024-03-27 DIAGNOSIS — I25.118 CORONARY ARTERY DISEASE OF NATIVE ARTERY OF NATIVE HEART WITH STABLE ANGINA PECTORIS (H): ICD-10-CM

## 2024-03-27 DIAGNOSIS — Z00.00 ENCOUNTER FOR MEDICARE ANNUAL WELLNESS EXAM: Primary | ICD-10-CM

## 2024-03-27 DIAGNOSIS — Z23 NEED FOR SHINGLES VACCINE: ICD-10-CM

## 2024-03-27 DIAGNOSIS — F10.21 ALCOHOL DEPENDENCE, IN REMISSION (H): ICD-10-CM

## 2024-03-27 DIAGNOSIS — M87.00 AVASCULAR NECROSIS OF BONE (H): ICD-10-CM

## 2024-03-27 DIAGNOSIS — N18.30 TYPE 2 DIABETES MELLITUS WITH STAGE 3 CHRONIC KIDNEY DISEASE, WITHOUT LONG-TERM CURRENT USE OF INSULIN, UNSPECIFIED WHETHER STAGE 3A OR 3B CKD (H): ICD-10-CM

## 2024-03-27 DIAGNOSIS — E83.110 HEREDITARY HEMOCHROMATOSIS (H): ICD-10-CM

## 2024-03-27 PROBLEM — A04.72 C. DIFFICILE COLITIS: Status: RESOLVED | Noted: 2023-12-19 | Resolved: 2024-03-27

## 2024-03-27 PROBLEM — C22.0 HCC (HEPATOCELLULAR CARCINOMA) (H): Status: RESOLVED | Noted: 2023-09-05 | Resolved: 2024-03-27

## 2024-03-27 PROCEDURE — 90750 HZV VACC RECOMBINANT IM: CPT | Performed by: FAMILY MEDICINE

## 2024-03-27 PROCEDURE — 99397 PER PM REEVAL EST PAT 65+ YR: CPT | Mod: 25 | Performed by: FAMILY MEDICINE

## 2024-03-27 PROCEDURE — 90471 IMMUNIZATION ADMIN: CPT | Performed by: FAMILY MEDICINE

## 2024-03-27 PROCEDURE — 90472 IMMUNIZATION ADMIN EACH ADD: CPT | Performed by: FAMILY MEDICINE

## 2024-03-27 PROCEDURE — 90746 HEPB VACCINE 3 DOSE ADULT IM: CPT | Performed by: FAMILY MEDICINE

## 2024-03-27 RX ORDER — ALLOPURINOL 100 MG/1
100 TABLET ORAL DAILY
COMMUNITY

## 2024-03-27 NOTE — PROGRESS NOTES
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    Class 2 severe obesity due to excess calories with serious comorbidity and body mass index (BMI) of 37.0 to 37.9 in adult (H)  Patient with decreased BMI, continue healthy diet and exercise as tolerated.    Type 2 diabetes mellitus with stage 3 chronic kidney disease, without long-term current use of insulin, unspecified whether stage 3a or 3b CKD (H)  Off insulin, noted to have small area of abrasion on top of left foot, will treat with topical antibiotic ointment and monitor    Coronary artery disease of native artery of native heart with stable angina pectoris (H24)  Follows with cardiology, no symptoms, continue current regimen    Alcohol dependence, in remission (H)  Patient is abstinent from alcohol, no change at this time    Need for shingles vaccine  Wife confirmed with insurance that shingles vaccine and hepatitis B vaccine are covered at the clinic.  These vaccine series are started today.            BMI  Estimated body mass index is 35.32 kg/m  as calculated from the following:    Height as of 2/13/24: 1.676 m (5' 6\").    Weight as of this " encounter: 99.2 kg (218 lb 12.8 oz).             Subjective   Casey is a 68 year old, presenting for the following health issues:  Immunization (Shingrix, Hepatitis Vaccines - ) and Annual Visit        3/27/2024     8:54 AM   Additional Questions   Roomed by Nuzhat HELLER CMA   Accompanied by Wife     History of Present Illness       Reason for visit:  Hb shots follow up    He eats 4 or more servings of fruits and vegetables daily.He consumes 0 sweetened beverage(s) daily.He exercises with enough effort to increase his heart rate 30 to 60 minutes per day.  He exercises with enough effort to increase his heart rate 5 days per week.   He is taking medications regularly.       Annual Wellness Visit     Patient is here for annual wellness visit and general follow-up.  We reviewed recent specialty visits.  Overall patient has been feeling very well.  Wife has noted a lesion on the top of his left foot.  He has chronic eczema but this blistered up like a pustule and now is more of a small ulceration.  It is not painful or itchy.  Seems to be slowly healing.  Would like to discuss vaccinations.    Patient has been advised of split billing requirements and indicates understanding: Yes          Health Care Directive  Patient has a Health Care Directive on file  Advance care planning document is on file and is current.  In general, how would you rate your overall physical health? good  Do you have a special diet?  Regular (no restrictions)        3/27/2024   Exercise, Social Connection, Stress   Days per week of moderate/strenous exercise 5 days   Average minutes spent exercising at this level 50 min   Frequency of gathering with friends or relatives Twice   Feel stress (tense, anxious, or unable to sleep) Not at all     Do you see a dentist two times every year?  Yes  Have you been more tired than usual lately?  No  If you drink alcohol do you typically have >3 drinks per day or >7 drinks per week? No  Do you have a current opioid  prescription? No  Do you use any other controlled substances or medications that are not prescribed by a provider? None  Social History     Tobacco Use    Smoking status: Never     Passive exposure: Never    Smokeless tobacco: Never   Vaping Use    Vaping Use: Never used   Substance Use Topics    Alcohol use: Not Currently     Comment: Last ETOH use was 12/31/2021    Drug use: Not Currently       Needs assistance for the following daily activities: no assistance needed  Which of the following safety concerns are present in your home?  none identified   Do you (or your family members) have any concerns about your safety while driving?  No  Do you have any of the following hearing concerns?: (!) IT'S HARD TO FOLLOW A CONVERSATION IN A NOISY RESTAURANT OR CROWDED ROOM  In the past 6 months, have you been bothered by leaking of urine? No           2/13/2024   Fall Risk   Fallen 2 or more times in the past year? No   Trouble with walking or balance? No            Today's PHQ-2 Score:       3/27/2024     9:04 AM   PHQ-2 ( 1999 Pfizer)   Q1: Little interest or pleasure in doing things 0   Q2: Feeling down, depressed or hopeless 0   PHQ-2 Score 0     Last PSA:   Prostate Specific Antigen Screen   Date Value Ref Range Status   11/22/2022 0.69 0.00 - 4.50 ng/mL Final     ASCVD Risk   The 10-year ASCVD risk score (Tre DK, et al., 2019) is: 21.8%    Values used to calculate the score:      Age: 68 years      Sex: Male      Is Non- : No      Diabetic: Yes      Tobacco smoker: No      Systolic Blood Pressure: 108 mmHg      Is BP treated: No      HDL Cholesterol: 29 mg/dL      Total Cholesterol: 159 mg/dL            Reviewed and updated as needed this visit by Provider   Tobacco  Allergies  Meds  Problems  Med Hx  Surg Hx  Fam Hx                Current providers sharing in care for this patient include:  Patient Care Team:  Gary Serrano MD as PCP - General (Family  Medicine)  Gary Serrano MD as Assigned PCP  Naz Cage MD as Assigned Infectious Disease Provider  Bhupinder Yancey PA-C as Assigned Cancer Care Provider  Gallo Sharma [facility] as Pharmacist (Pharmacist)  Molly Fenton, RN as Registered Nurse  Subhash Dutta MD as Assigned Nephrology Provider  Chad Clifton MD as MD (Surgery)  Subhash Dutta MD as MD (Nephrology)  Lolis Bermudez MD as Physician (Gastroenterology)  Jeovany Scott MD as MD (Nephrology)  Darrel Galo MD as MD (Nephrology)  Chad Clifton MD as Assigned Surgical Provider  Gallo Sharma [facility] as Assigned MTM Pharmacist  Phillip Marrero MD as Assigned Heart and Vascular Provider  Tito Tran MD as MD (Dermatology)  Rafael Delgado MD as MD (Neurology)  Rafael Delgado MD as Assigned Neuroscience Provider  Alba Knott APRN CNP as Assigned Pulmonology Provider    The following health maintenance items are reviewed in Epic and correct as of today:  Health Maintenance   Topic Date Due    DIABETIC FOOT EXAM  Never done    ZOSTER IMMUNIZATION (1 of 2) Never done    HEPATITIS B IMMUNIZATION (3 of 5 - Risk Dialysis 4-dose series) 10/27/2021    Pneumococcal Vaccine: 65+ Years (3 of 3 - PPSV23 or PCV20) 05/16/2023    A1C  03/28/2024    MICROALBUMIN  07/31/2024    EYE EXAM  11/23/2024    LIPID  12/28/2024    TSH W/FREE T4 REFLEX  12/28/2024    FALL RISK ASSESSMENT  02/13/2025    BMP  03/25/2025    MEDICARE ANNUAL WELLNESS VISIT  03/27/2025    ANNUAL REVIEW OF HM ORDERS  03/27/2025    COLORECTAL CANCER SCREENING  11/16/2026    ADVANCE CARE PLANNING  03/27/2029    DTAP/TDAP/TD IMMUNIZATION (3 - Td or Tdap) 08/29/2031    HEPATITIS C SCREENING  Completed    PHQ-2 (once per calendar year)  Completed    INFLUENZA VACCINE  Completed    URINALYSIS  Completed    HEPATITIS A IMMUNIZATION  Completed    RSV VACCINE (Pregnancy & 60+)  Completed    COVID-19 Vaccine  Completed    IPV  IMMUNIZATION  Aged Out    HPV IMMUNIZATION  Aged Out    MENINGITIS IMMUNIZATION  Aged Out    RSV MONOCLONAL ANTIBODY  Aged Out       Appropriate preventive services were discussed with this patient, including applicable screening as appropriate for fall prevention, nutrition, physical activity, Tobacco-use cessation, weight loss and cognition.  Checklist reviewing preventive services available has been given to the patient.           3/27/2024   Mini Cog   Clock Draw Score 2 Normal   3 Item Recall 3 objects recalled   Mini Cog Total Score 5         Vision Screen                 Objective    /62   Pulse 87   Resp 14   Wt 99.2 kg (218 lb 12.8 oz)   SpO2 98%   BMI 35.32 kg/m    Body mass index is 35.32 kg/m .  Physical Exam   GENERAL: alert and no distress  EYES: Eyes grossly normal to inspection, PERRL and conjunctivae and sclerae normal  HENT: ear canals and TM's normal, nose and mouth without ulcers or lesions  NECK: no adenopathy, no asymmetry, masses, or scars  RESP: lungs clear to auscultation - no rales, rhonchi or wheezes  CV: regular rate and rhythm, normal S1 S2, no S3 or S4, no murmur, click or rub, no peripheral edema  ABDOMEN: soft, nontender, no hepatosplenomegaly, no masses and bowel sounds normal  MS: no gross musculoskeletal defects noted, no edema  SKIN: no suspicious lesions or rashes  NEURO: Normal strength and tone, mentation intact and speech normal  PSYCH: mentation appears normal, affect normal/bright  Diabetic foot exam: Chronic vascular changes noted, decreased sensation on the bottom of feet, on top of left foot has a dime sized abrasion with no increased erythema            Signed Electronically by: Gary Serrano MD

## 2024-03-27 NOTE — PATIENT INSTRUCTIONS
Preventive Care Advice   This is general advice given by our system to help you stay healthy. However, your care team may have specific advice just for you. Please talk to your care team about your preventive care needs.  Nutrition  Eat 5 or more servings of fruits and vegetables each day.  Try wheat bread, brown rice and whole grain pasta (instead of white bread, rice, and pasta).  Get enough calcium and vitamin D. Check the label on foods and aim for 100% of the RDA (recommended daily allowance).  Lifestyle  Exercise at least 150 minutes each week   (30 minutes a day, 5 days a week).  Do muscle strengthening activities 2 days a week. These help control your weight and prevent disease.  No smoking.  Wear sunscreen to prevent skin cancer.  Have a dental exam and cleaning every 6 months.  Yearly exams  See your health care team every year to talk about:  Any changes in your health.  Any medicines your care team has prescribed.  Preventive care, family planning, and ways to prevent chronic diseases.  Shots (vaccines)   HPV shots (up to age 26), if you've never had them before.  Hepatitis B shots (up to age 59), if you've never had them before.  COVID-19 shot: Get this shot when it's due.  Flu shot: Get a flu shot every year.  Tetanus shot: Get a tetanus shot every 10 years.  Pneumococcal, hepatitis A, and RSV shots: Ask your care team if you need these based on your risk.  Shingles shot (for age 50 and up).  General health tests  Diabetes screening:  Starting at age 35, Get screened for diabetes at least every 3 years.  If you are younger than age 35, ask your care team if you should be screened for diabetes.  Cholesterol test: At age 39, start having a cholesterol test every 5 years, or more often if advised.  Bone density scan (DEXA): At age 50, ask your care team if you should have this scan for osteoporosis (brittle bones).  Hepatitis C: Get tested at least once in your life.  STIs (sexually transmitted  infections)  Before age 24: Ask your care team if you should be screened for STIs.  After age 24: Get screened for STIs if you're at risk. You are at risk for STIs (including HIV) if:  You are sexually active with more than one person.  You don't use condoms every time.  You or a partner was diagnosed with a sexually transmitted infection.  If you are at risk for HIV, ask about PrEP medicine to prevent HIV.  Get tested for HIV at least once in your life, whether you are at risk for HIV or not.  Cancer screening tests  Cervical cancer screening: If you have a cervix, begin getting regular cervical cancer screening tests at age 21. Most people who have regular screenings with normal results can stop after age 65. Talk about this with your provider.  Breast cancer scan (mammogram): If you've ever had breasts, begin having regular mammograms starting at age 40. This is a scan to check for breast cancer.  Colon cancer screening: It is important to start screening for colon cancer at age 45.  Have a colonoscopy test every 10 years (or more often if you're at risk) Or, ask your provider about stool tests like a FIT test every year or Cologuard test every 3 years.  To learn more about your testing options, visit: https://www.[place]land/216603.pdf.  For help making a decision, visit: https://bit.ly/ui70577.  Prostate cancer screening test: If you have a prostate and are age 55 to 69, ask your provider if you would benefit from a yearly prostate cancer screening test.  Lung cancer screening: If you are a current or former smoker age 50 to 80, ask your care team if ongoing lung cancer screenings are right for you.  For informational purposes only. Not to replace the advice of your health care provider. Copyright   2023 [employer]. All rights reserved. Clinically reviewed by the [facility] Transitions Program. [employer] [id number] - REV 01/24.    Hearing Loss: Care Instructions  Overview     Hearing loss is a  sudden or slow decrease in how well you hear. It can range from slight to profound. Permanent hearing loss can occur with aging. It also can happen when you are exposed long-term to loud noise. Examples include listening to loud music, riding motorcycles, or being around other loud machines.  Hearing loss can affect your work and home life. It can make you feel lonely or depressed. You may feel that you have lost your independence. But hearing aids and other devices can help you hear better and feel connected to others.  Follow-up care is a key part of your treatment and safety. Be sure to make and go to all appointments, and call your doctor if you are having problems. It's also a good idea to know your test results and keep a list of the medicines you take.  How can you care for yourself at home?  Avoid loud noises whenever possible. This helps keep your hearing from getting worse.  Always wear hearing protection around loud noises.  Wear a hearing aid as directed.  A professional can help you pick a hearing aid that will work best for you.  You can also get hearing aids over the counter for mild to moderate hearing loss.  Have hearing tests as your doctor suggests. They can show whether your hearing has changed. Your hearing aid may need to be adjusted.  Use other devices as needed. These may include:  Telephone amplifiers and hearing aids that can connect to a television, stereo, radio, or microphone.  Devices that use lights or vibrations. These alert you to the doorbell, a ringing telephone, or a baby monitor.  Television closed-captioning. This shows the words at the bottom of the screen. Most new TVs can do this.  TTY (text telephone). This lets you type messages back and forth on the telephone instead of talking or listening. These devices are also called TDD. When messages are typed on the keyboard, they are sent over the phone line to a receiving TTY. The message is shown on a monitor.  Use text  "messaging, social media, and email if it is hard for you to communicate by telephone.  Try to learn a listening technique called speechreading. It is not lipreading. You pay attention to people's gestures, expressions, posture, and tone of voice. These clues can help you understand what a person is saying. Face the person you are talking to, and have them face you. Make sure the lighting is good. You need to see the other person's face clearly.  Think about counseling if you need help to adjust to your hearing loss.  When should you call for help?  Watch closely for changes in your health, and be sure to contact your doctor if:    You think your hearing is getting worse.     You have new symptoms, such as dizziness or nausea.   Where can you learn more?  Go to https://www.Dropbox.net/patiented  Enter R798 in the search box to learn more about \"Hearing Loss: Care Instructions.\"  Current as of: September 27, 2023               Content Version: 14.0    9110-5093 Bundle It.   Care instructions adapted under license by your healthcare professional. If you have questions about a medical condition or this instruction, always ask your healthcare professional. [employer] disclaims any warranty or liability for your use of this information.      "

## 2024-03-28 LAB
LABORATORY REPORT: NORMAL
PETH INTERPRETATION: NORMAL
PLPETH BLD-MCNC: <10 NG/ML
POPETH BLD-MCNC: <10 NG/ML

## 2024-03-29 LAB
CW9: 634
DONOR IDENTIFICATION: NORMAL
DSA COMMENTS: NORMAL
DSA PRESENT: YES
DSA TEST METHOD: NORMAL
ORGAN: NORMAL
SA 1 CELL: NORMAL
SA 1 TEST METHOD: NORMAL
SA 2 CELL: NORMAL
SA 2 TEST METHOD: NORMAL
SA1 HI RISK ABY: NORMAL
SA1 MOD RISK ABY: NORMAL
SA2 HI RISK ABY: NORMAL
SA2 MOD RISK ABY: NORMAL
UNACCEPTABLE ANTIGENS: NORMAL
ZZZSA 1  COMMENTS: NORMAL
ZZZSA 2 COMMENTS: NORMAL

## 2024-04-01 ENCOUNTER — MYC REFILL (OUTPATIENT)
Dept: FAMILY MEDICINE | Facility: CLINIC | Age: 69
End: 2024-04-01
Payer: MEDICARE

## 2024-04-01 DIAGNOSIS — Z94.0 KIDNEY REPLACED BY TRANSPLANT: ICD-10-CM

## 2024-04-01 NOTE — TELEPHONE ENCOUNTER
Received request to reschedule patient's Liver Eval appointment to 10/22/19. All appointments have been rescheduled to 10/22/19 and a new schedule has been sent to the patient via [employer].   wnl

## 2024-04-01 NOTE — TELEPHONE ENCOUNTER
Last office visit: 3/27/2024     Future Appointments 4/1/2024 - 9/28/2024        Date Visit Type Length Department Provider     4/3/2024  8:40 AM NEW MED SPINE PHYSICIAN 40 min [facility] Abdulaziz Mejia DO    Location Instructions:     We are located at [address] [place] [address] [address]  [place] [address]              4/5/2024  7:45 AM MYCHART LAB VISIT 15 min [facility]    Location Instructions:     [address] [place] [address]              6/18/2024 10:00 AM NEW ENDOCRINE 60 min [facility] ENDO DIABETES Last Baker MD    Location Instructions:     Located in the Clinics and Surgery Center at [address] [place] [address]. For parking options, enter the [facility] /arrival plaza from [facility] and attendants can assist you based on your needs.  parking is available for those with limited mobility M-F from 7 a.m. to 5 p.m. Due to short staffing, we are unable to offer  to all patients/visitors. Visit [email or web address] for more details.  Self-parking:&nbsp;  West Lot: Located across from the main entrance, this is a convenient option for patients. Enter on [facility]. Parking attendants available most hours to assist.&nbsp;     [address] Ramp: Enter at the [facility] SE entrance (one block north of the [facility] main entrance). Do not enter the ramp from [facility] - this entrance is not staffed and is further from the [facility] main entrance.              6/19/2024  9:30 AM RETURN SUBSPECIALTY NEUROPATHY 30 min [facility] NEUROLOGY Rafael Delgado MD    Location Instructions:     Located in the [facility] and Surgery Center at [address] [place] [address]. For parking options, enter the [facility] /arrival plaza from [facility] and attendants can assist you based on your needs.  parking is available for those with limited mobility M-F from 7 a.m. to 5 p.m. Due to short staffing, we are unable to offer  to all patients/visitors. Visit [email or web address]  for more details.  Self-parking:&nbsp;  West Lot: Located across from the main entrance, this is a convenient option for patients. Enter on [address]. Parking attendants available most hours to assist.&nbsp;     [facility] Ramp: Enter at the [facility] entrance (one block north of the [facility] main entrance). Do not enter the ramp from [facility] - this entrance is not staffed and is further from the [facility] main entrance.              6/25/2024  8:00 AM LAB 15 min [facility]  LAB    Location Instructions:     Located in the [facility] at [address] [place] [address]. For parking options, enter the [facility] /arrival plaza from [facility] and attendants can assist you based on your needs.  parking is available for those with limited mobility M-F from 7 a.m. to 5 p.m. Due to short staffing, we are unable to offer  to all patients/visitors. Visit [email or web address] for more details.  Self-parking:&nbsp;  West Lot: Located across from the main entrance, this is a convenient option for patients. Enter on [address]. Parking attendants available most hours to assist.&nbsp;     [facility] Ramp: Enter at the [facility] entrance (one block north of the [facility] main entrance). Do not enter the ramp from [facility] - this entrance is not staffed and is further from the [facility] main entrance.              6/25/2024  9:30 AM LIVER POST RETURN TXP HEPT 30 min  HEPATOLOGY Lolis Bermudez MD    Location Instructions:     Located in the [facility] at [address] [place] [address], [address], [facility] [address]. For parking options, enter the [facility] /arrival plaza from [facility] and attendants can assist you based on your needs.  parking is available for those with limited mobility M-F from 7 a.m. to 5 p.m. Due to short staffing, we are unable to offer  to all patients/visitors. Visit [email or web address] for more details.  Self-parking:&nbsp;   West Lot: Located across from the main entrance, this is a convenient option for patients. Enter on [facility]. Parking attendants available most hours to assist.&nbsp;     [facility] Ramp: Enter at the [facility] entrance (one block north of the [facility] main entrance). Do not enter the ramp from [facility] - this entrance is not staffed and is further from the [facility] main entrance.              6/25/2024 10:00 AM K/P POST RETURN TXP NEPH 30 min [facility] Marbella Estes MD    Location Instructions:     Located in the Clinics and Surgery Center at [address] [place] [address]. For parking options, enter the [facility] /arrival plaza from [facility] and attendants can assist you based on your needs.  parking is available for those with limited mobility M-F from 7 a.m. to 5 p.m. Due to short staffing, we are unable to offer  to all patients/visitors. Visit [email or web address] for more details.  Self-parking:&nbsp;  West Lot: Located across from the main entrance, this is a convenient option for patients. Enter on [facility]. Parking attendants available most hours to assist.&nbsp;     [facility] Ramp: Enter at the [facility] SE entrance (one block north of the [facility] main entrance). Do not enter the ramp from [facility] - this entrance is not staffed and is further from the [facility] main entrance.                     Requested Prescriptions   Pending Prescriptions Disp Refills    capsaicin (ZOSTRIX) 0.025 % external cream       Sig: Apply to feet two times per day       There is no refill protocol information for this order

## 2024-04-02 DIAGNOSIS — G62.9 NEUROPATHY: ICD-10-CM

## 2024-04-02 RX ORDER — CAPSAICIN 0.025 %
CREAM (GRAM) TOPICAL
Qty: 60 G | Refills: 1 | Status: SHIPPED | OUTPATIENT
Start: 2024-04-02 | End: 2024-09-20

## 2024-04-02 RX ORDER — GABAPENTIN 100 MG/1
CAPSULE ORAL
Qty: 360 CAPSULE | Refills: 3 | Status: SHIPPED | OUTPATIENT
Start: 2024-04-02 | End: 2024-06-25

## 2024-04-02 NOTE — PROGRESS NOTES
Gabapentin prescription got changed to historical by Dr. Dutta on 3/25, so it cancelled at [facility]'s pharmacy. Reordered gabapentin and prescription sent to his preferred pharmacy.

## 2024-04-03 ENCOUNTER — OFFICE VISIT (OUTPATIENT)
Dept: PHYSICAL MEDICINE AND REHAB | Facility: CLINIC | Age: 69
End: 2024-04-03
Attending: PSYCHIATRY & NEUROLOGY
Payer: COMMERCIAL

## 2024-04-03 VITALS
DIASTOLIC BLOOD PRESSURE: 76 MMHG | BODY MASS INDEX: 35.83 KG/M2 | SYSTOLIC BLOOD PRESSURE: 120 MMHG | HEART RATE: 83 BPM | WEIGHT: 222 LBS

## 2024-04-03 DIAGNOSIS — M79.18 MYOFASCIAL PAIN: ICD-10-CM

## 2024-04-03 DIAGNOSIS — M47.816 LUMBAR FACET ARTHROPATHY: ICD-10-CM

## 2024-04-03 DIAGNOSIS — M48.061 STENOSIS OF LATERAL RECESS OF LUMBAR SPINE: ICD-10-CM

## 2024-04-03 DIAGNOSIS — M54.16 LUMBAR RADICULAR PAIN: Primary | ICD-10-CM

## 2024-04-03 DIAGNOSIS — M48.061 FORAMINAL STENOSIS OF LUMBAR REGION: ICD-10-CM

## 2024-04-03 PROCEDURE — 99204 OFFICE O/P NEW MOD 45 MIN: CPT | Performed by: PHYSICAL MEDICINE & REHABILITATION

## 2024-04-03 ASSESSMENT — PAIN SCALES - GENERAL: PAINLEVEL: MODERATE PAIN (4)

## 2024-04-03 NOTE — PROGRESS NOTES
Assessment/Plan:      Casey was seen today for back pain.    Diagnoses and all orders for this visit:    Lumbar radicular pain  -     Physical Therapy  Referral; Future    Foraminal stenosis of lumbar region  -     Physical Therapy  Referral; Future    Stenosis of lateral recess of lumbar spine  -     Physical Therapy  Referral; Future    Lumbar facet arthropathy  -     Physical Therapy  Referral; Future    Myofascial pain  -     Physical Therapy  Referral; Future    Other orders  -     Spine  Referral         Assessment: Pleasant 68 year old male with history of coronary artery disease, hypertension, hypothyroid, Hepatocellular carcinoma status post liver and kidney transplant 1 year ago with:    1.  Chronic lumbar spine pain with left lower extremity radicular pain.  He has multilevel degenerative disc disease through the lumbar spine with severe left foraminal stenosis at L5-S1 and lateral recess stenosis with facet arthropathy and facet joint edema.  EMG findings positive for left S1 radiculopathy I also question if the L5 nerve is involved given the MRI findings.  On gabapentin causes drowsiness.    2.  Myofascial pain lumbar spine.      3.  Paresthesias in the feet consistent with peripheral polyneuropathy.  On gabapentin.      Discussion:    1.  I discussed the diagnosis and treatment options.  I discussed options of physical therapy lumbar injections and surgical evaluation medications.  He does not want to change medications at this time.  He does not want injections/procedures or surgeries at this time I would like to start with more conservative management.  He has no weakness on exam other than potential proximal weakness with a slight Trendelenburg gait likely more due to pain than true weakness.  Will monitor for weakness.    2.  Start physical therapy for lumbar core strengthening stabilization nerve glides.    3.  Can consider left L5-S1 and S1-S2  TFESI if symptoms worsen or he is unable to tolerate therapy.    4.  Follow-up 2 months.      It was our pleasure caring for your patient today, if there any questions or concerns please do not hesitate to contact us.      Subjective:   Patient ID: Damion Quinones is a 68 year old male.    History of Present Illness: Patient presents at the request of Dr. Delgado for evaluation of lumbar spine pain and left lower extremity pain with bilateral foot paresthesias.  Patient has had pain for at least a couple of years.  Worsened over the past year following liver and kidney transplant.  His pain is now constant low back down the left leg to the knee occasionally to the calf and the foot.  Paresthesias bilateral feet attributed to his neuropathy.  Pain is worse when getting up from supine position but laying down itself is not painful.  Difficult to sleep in bed as getting up is painful.  Sitting also causes pain walking or moving seems to be the best for him.  Has not had physical therapy.  Has had chiropractor.  Taking gabapentin for neuropathy which does help but causes drowsiness.  No interventions.  No significant weakness in the leg or paresthesias only pain.      Imaging: I personally reviewed MRI lumbar spine along with the reportFrom February 10, 2024.  Multilevel degenerative disc disease broad-based disc bulge osteophyte.  No high-grade central stenosis.  At L5-S1 severe facet arthropathy with facet joint edema moderate right severe left foraminal stenosis.  Moderate bilateral foraminal stenosis L4-5 and right and mild left at L3-4.  There is some lateral recess stenosis on the left at L5-S1 synovial cyst as well.     CT lumbar spine/abdomen and pelvis was personally reviewed as well as evaluate the lumbar spine showing severe left foraminal stenosis bony with the osteophyte protruding midline in the foramen on the left.  Severe facet arthropathy bilaterally.  Mild osteoarthritis of the left hip.    EMG bilateral  lower extremities reviewed indicating peripheral polyneuropathy.  Active left S1 radiculopathy.  On my review of positive muscles on needle EMG, there is some overlap with L5 innervated muscles and could also indicate an active left L5 radiculopathy.         Review of Systems: Complains of ringing in the ears, sexual dysfunction, skin itching.  Denies fever, weight loss, waking, headache, change in vision, chest pain, shortness of breath, abdominal pain, nausea vomiting, bowel or bladder incontinence, other skin issues, balance issues, sleep issues.  Remainder of 12 point review systems negative unless listed above.    Past Medical History:   Diagnosis Date    Alcoholic cirrhosis of liver with ascites (H) 10/11/2019    ANCA-associated vasculitis (H) 2022    Antiplatelet or antithrombotic long-term use     Avascular necrosis (H)     Left femoral head    C. difficile colitis     C. difficile diarrhea     Coronary artery disease     [facility] 4/2023 - complete occlusion of RCA    ESRD (end stage renal disease) on dialysis (H)     Gout     HCC (hepatocellular carcinoma) (H) 09/05/2023    History of hemochromatosis 10/11/2019    Hypertension     Hypothyroidism 12/19/2023    IgA nephropathy     Obesity     PAF (paroxysmal atrial fibrillation) (H)     Pre-diabetes 2023    Psoriatic arthritis (H)     RA (rheumatoid arthritis) (H)     Status post kidney transplant 06/06/2023    Induction with thymoglobulin 4mg/kg, + DSA CW9    Status post liver transplantation (H) 06/06/2023       Family History   Problem Relation Age of Onset    Lung Cancer Mother     Colon Cancer Father     Lung Cancer Brother     Heart Failure Brother     Kidney Disease Brother          Social History     Socioeconomic History    Marital status:      Spouse name: None    Number of children: None    Years of education: None    Highest education level: None   Tobacco Use    Smoking status: Never     Passive exposure: Never    Smokeless tobacco: Never    Vaping Use    Vaping Use: Never used   Substance and Sexual Activity    Alcohol use: Not Currently     Comment: Last ETOH use was 12/31/2021    Drug use: Not Currently    Sexual activity: Yes     Social Determinants of Health     Physical Activity: Sufficiently Active (3/27/2024)    Exercise Vital Sign     Days of Exercise per Week: 5 days     Minutes of Exercise per Session: 50 min   Stress: No Stress Concern Present (3/27/2024)    [employer] [place] [employer] - Occupational Stress Questionnaire     Feeling of Stress : Not at all   Social Connections: Unknown (3/27/2024)    Social Connection and Isolation Panel [NHANES]     Frequency of Social Gatherings with Friends and Family: Twice a week   Social history: Retired.  .  2 grown children.  No tobacco.  No alcohol currently.      The following portions of the patient's history were reviewed and updated as appropriate: allergies, current medications, past family history, past medical history, past social history, past surgical history and problem list.    Oswestry (RAYMUNDO) Questionnaire         No data to display                Neck Disability Index:       No data to display                   PHQ-2 Score:         3/27/2024     9:04 AM 2/13/2024     1:34 PM   PHQ-2 ( 1999 [employer])   Q1: Little interest or pleasure in doing things 0 0   Q2: Feeling down, depressed or hopeless 0 0   PHQ-2 Score 0 0   Q1: Little interest or pleasure in doing things  Not at all   Q2: Feeling down, depressed or hopeless  Not at all   PHQ-2 Score  0                  Objective:   Physical Exam:    /76   Pulse 83   Wt 222 lb (100.7 kg)   BMI 35.83 kg/m    Body mass index is 35.83 kg/m .    General:  Well-appearing male in no acute distress.  Pleasant, cooperative, and interactive throughout the examination and interview.  CV: 1+ bilateral lower extremity pitting edema.  Lymphatics: No cervical lymphadenopathy palpated. Eyes: sclera clear. Skin: No rashes or lesions  seen over the head/neck, hairline, arms,   calves.  Respirations unlabored.  MSK: Gait is mildly antalgic left/mild Trendelenburg left.  Able to heel-toe stand without difficulty.  Negative Romberg.  Spine: normal AP curves of the C, T, and L spine.  Moderately reduced lumbar flexion finger to floor testing.  Palpation: No significant tenderness lumbar spine or gluteal tissues.  Extremities: Full range of motion of the elbows, and wrists with no effusions or tenderness to palpation.   Full range of motion of the hips, knees, and ankles from seated with no effusions or tenderness to palpation. No hypermobility of the upper or lower extremities.  Neurologic exam: Mental status: Patient is alert and oriented with normal affect.  Attention, knowledge, memory, and language are intact.  Normal coordination throughout the examination.  Reflexes are 1+ and symmetric biceps, triceps, brachioradialis, patellar, and 0 Achilles with equivocal toes and Negative Adeel's.  Sensation is intact to light touch throughout the upper and lower extremities bilaterally.  Manual muscle testing reveals 5 out of 5 in the hip flexors, knee flexors/extensors, ankle plantar flexors, ankle  dorsiflexors, and EHL.  Upper extremities: Grossly normal strength . Normal muscle bulk and tone in the arms and legs.    Negative seated  straight leg raise bilaterally.

## 2024-04-03 NOTE — PATIENT INSTRUCTIONS
A physical therapy order was provided for you today.  You will be contacted by physical therapy.  If nobody contacts you within 3 to 5 days, please contact the clinic at [phone].  It will be very important for you to do your physical therapy exercises on a regular basis to decrease your pain and prevent future pain flares.

## 2024-04-03 NOTE — LETTER
4/3/2024         RE: Damion Quionnes   [address] [facility] [address]        Dear Colleague,    Thank you for referring your patient, Damion Quinones, to the [facility] SP[facility]ERY. Please see a copy of my visit note below.    Assessment/Plan:      Casey was seen today for back pain.    Diagnoses and all orders for this visit:    Lumbar radicular pain  -     Physical Therapy  Referral; Future    Foraminal stenosis of lumbar region  -     Physical Therapy  Referral; Future    Stenosis of lateral recess of lumbar spine  -     Physical Therapy  Referral; Future    Lumbar facet arthropathy  -     Physical Therapy  Referral; Future    Myofascial pain  -     Physical Therapy  Referral; Future    Other orders  -     Spine  Referral         Assessment: Pleasant 68 year old male with history of coronary artery disease, hypertension, hypothyroid, Hepatocellular carcinoma status post liver and kidney transplant 1 year ago with:    1.  Chronic lumbar spine pain with left lower extremity radicular pain.  He has multilevel degenerative disc disease through the lumbar spine with severe left foraminal stenosis at L5-S1 and lateral recess stenosis with facet arthropathy and facet joint edema.  EMG findings positive for left S1 radiculopathy I also question if the L5 nerve is involved given the MRI findings.  On gabapentin causes drowsiness.    2.  Myofascial pain lumbar spine.      3.  Paresthesias in the feet consistent with peripheral polyneuropathy.  On gabapentin.      Discussion:    1.  I discussed the diagnosis and treatment options.  I discussed options of physical therapy lumbar injections and surgical evaluation medications.  He does not want to change medications at this time.  He does not want injections/procedures or surgeries at this time I would like to start with more conservative management.  He has no weakness on exam other than potential  proximal weakness with a slight Trendelenburg gait likely more due to pain than true weakness.  Will monitor for weakness.    2.  Start physical therapy for lumbar core strengthening stabilization nerve glides.    3.  Can consider left L5-S1 and S1-S2 TFESI if symptoms worsen or he is unable to tolerate therapy.    4.  Follow-up 2 months.      It was our pleasure caring for your patient today, if there any questions or concerns please do not hesitate to contact us.      Subjective:   Patient ID: Damion Quinones is a 68 year old male.    History of Present Illness: Patient presents at the request of Dr. Delgado for evaluation of lumbar spine pain and left lower extremity pain with bilateral foot paresthesias.  Patient has had pain for at least a couple of years.  Worsened over the past year following liver and kidney transplant.  His pain is now constant low back down the left leg to the knee occasionally to the calf and the foot.  Paresthesias bilateral feet attributed to his neuropathy.  Pain is worse when getting up from supine position but laying down itself is not painful.  Difficult to sleep in bed as getting up is painful.  Sitting also causes pain walking or moving seems to be the best for him.  Has not had physical therapy.  Has had chiropractor.  Taking gabapentin for neuropathy which does help but causes drowsiness.  No interventions.  No significant weakness in the leg or paresthesias only pain.      Imaging: I personally reviewed MRI lumbar spine along with the reportFrom February 10, 2024.  Multilevel degenerative disc disease broad-based disc bulge osteophyte.  No high-grade central stenosis.  At L5-S1 severe facet arthropathy with facet joint edema moderate right severe left foraminal stenosis.  Moderate bilateral foraminal stenosis L4-5 and right and mild left at L3-4.  There is some lateral recess stenosis on the left at L5-S1 synovial cyst as well.     CT lumbar spine/abdomen and pelvis was personally  reviewed as well as evaluate the lumbar spine showing severe left foraminal stenosis bony with the osteophyte protruding midline in the foramen on the left.  Severe facet arthropathy bilaterally.  Mild osteoarthritis of the left hip.    EMG bilateral lower extremities reviewed indicating peripheral polyneuropathy.  Active left S1 radiculopathy.  On my review of positive muscles on needle EMG, there is some overlap with L5 innervated muscles and could also indicate an active left L5 radiculopathy.         Review of Systems: Complains of ringing in the ears, sexual dysfunction, skin itching.  Denies fever, weight loss, waking, headache, change in vision, chest pain, shortness of breath, abdominal pain, nausea vomiting, bowel or bladder incontinence, other skin issues, balance issues, sleep issues.  Remainder of 12 point review systems negative unless listed above.    Past Medical History:   Diagnosis Date     Alcoholic cirrhosis of liver with ascites (H) 10/11/2019     ANCA-associated vasculitis (H) 2022     Antiplatelet or antithrombotic long-term use      Avascular necrosis (H)     Left femoral head     C. difficile colitis      C. difficile diarrhea      Coronary artery disease     [facility] 4/2023 - complete occlusion of RCA     ESRD (end stage renal disease) on dialysis (H)      Gout      HCC (hepatocellular carcinoma) (H) 09/05/2023     History of hemochromatosis 10/11/2019     Hypertension      Hypothyroidism 12/19/2023     IgA nephropathy      Obesity      PAF (paroxysmal atrial fibrillation) (H)      Pre-diabetes 2023     Psoriatic arthritis (H)      RA (rheumatoid arthritis) (H)      Status post kidney transplant 06/06/2023    Induction with thymoglobulin 4mg/kg, + DSA CW9     Status post liver transplantation (H) 06/06/2023       Family History   Problem Relation Age of Onset     Lung Cancer Mother      Colon Cancer Father      Lung Cancer Brother      Heart Failure Brother      Kidney Disease Brother           Social History     Socioeconomic History     Marital status:      Spouse name: None     Number of children: None     Years of education: None     Highest education level: None   Tobacco Use     Smoking status: Never     Passive exposure: Never     Smokeless tobacco: Never   Vaping Use     Vaping Use: Never used   Substance and Sexual Activity     Alcohol use: Not Currently     Comment: Last ETOH use was 12/31/2021     Drug use: Not Currently     Sexual activity: Yes     Social Determinants of Health     Physical Activity: Sufficiently Active (3/27/2024)    Exercise Vital Sign      Days of Exercise per Week: 5 days      Minutes of Exercise per Session: 50 min   Stress: No Stress Concern Present (3/27/2024)    [employer] - Occupational Stress Questionnaire      Feeling of Stress : Not at all   Social Connections: Unknown (3/27/2024)    Social Connection and Isolation Panel [NHANES]      Frequency of Social Gatherings with Friends and Family: Twice a week   Social history: Retired.  .  2 grown children.  No tobacco.  No alcohol currently.      The following portions of the patient's history were reviewed and updated as appropriate: allergies, current medications, past family history, past medical history, past social history, past surgical history and problem list.    Oswestry (RAYMUNDO) Questionnaire         No data to display                Neck Disability Index:       No data to display                   PHQ-2 Score:         3/27/2024     9:04 AM 2/13/2024     1:34 PM   PHQ-2 ( 1999 [employer])   Q1: Little interest or pleasure in doing things 0 0   Q2: Feeling down, depressed or hopeless 0 0   PHQ-2 Score 0 0   Q1: Little interest or pleasure in doing things  Not at all   Q2: Feeling down, depressed or hopeless  Not at all   PHQ-2 Score  0                  Objective:   Physical Exam:    /76   Pulse 83   Wt 222 lb (100.7 kg)   BMI 35.83 kg/m    Body mass index is  35.83 kg/m .    General:  Well-appearing male in no acute distress.  Pleasant, cooperative, and interactive throughout the examination and interview.  CV: 1+ bilateral lower extremity pitting edema.  Lymphatics: No cervical lymphadenopathy palpated. Eyes: sclera clear. Skin: No rashes or lesions seen over the head/neck, hairline, arms,   calves.  Respirations unlabored.  MSK: Gait is mildly antalgic left/mild Trendelenburg left.  Able to heel-toe stand without difficulty.  Negative Romberg.  Spine: normal AP curves of the C, T, and L spine.  Moderately reduced lumbar flexion finger to floor testing.  Palpation: No significant tenderness lumbar spine or gluteal tissues.  Extremities: Full range of motion of the elbows, and wrists with no effusions or tenderness to palpation.   Full range of motion of the hips, knees, and ankles from seated with no effusions or tenderness to palpation. No hypermobility of the upper or lower extremities.  Neurologic exam: Mental status: Patient is alert and oriented with normal affect.  Attention, knowledge, memory, and language are intact.  Normal coordination throughout the examination.  Reflexes are 1+ and symmetric biceps, triceps, brachioradialis, patellar, and 0 Achilles with equivocal toes and Negative Adeel's.  Sensation is intact to light touch throughout the upper and lower extremities bilaterally.  Manual muscle testing reveals 5 out of 5 in the hip flexors, knee flexors/extensors, ankle plantar flexors, ankle  dorsiflexors, and EHL.  Upper extremities: Grossly normal strength . Normal muscle bulk and tone in the arms and legs.    Negative seated  straight leg raise bilaterally.        Again, thank you for allowing me to participate in the care of your patient.        Sincerely,        Abdulaziz Mejia DO

## 2024-04-05 ENCOUNTER — LAB (OUTPATIENT)
Dept: LAB | Facility: CLINIC | Age: 69
End: 2024-04-05
Payer: COMMERCIAL

## 2024-04-05 DIAGNOSIS — Z48.298 AFTERCARE FOLLOWING ORGAN TRANSPLANT: ICD-10-CM

## 2024-04-05 DIAGNOSIS — Z94.0 KIDNEY REPLACED BY TRANSPLANT: ICD-10-CM

## 2024-04-05 DIAGNOSIS — Z94.4 LIVER TRANSPLANT RECIPIENT (H): ICD-10-CM

## 2024-04-05 DIAGNOSIS — D63.1 ANEMIA IN STAGE 2 CHRONIC KIDNEY DISEASE: ICD-10-CM

## 2024-04-05 DIAGNOSIS — N18.2 ANEMIA IN STAGE 2 CHRONIC KIDNEY DISEASE: ICD-10-CM

## 2024-04-05 DIAGNOSIS — Z79.899 ENCOUNTER FOR LONG-TERM CURRENT USE OF MEDICATION: ICD-10-CM

## 2024-04-05 DIAGNOSIS — Z94.4 LIVER REPLACED BY TRANSPLANT (H): ICD-10-CM

## 2024-04-05 LAB
TACROLIMUS BLD-MCNC: 7.5 UG/L (ref 5–15)
TME LAST DOSE: NORMAL H
TME LAST DOSE: NORMAL H

## 2024-04-05 PROCEDURE — 87799 DETECT AGENT NOS DNA QUANT: CPT

## 2024-04-05 PROCEDURE — 36415 COLL VENOUS BLD VENIPUNCTURE: CPT

## 2024-04-05 PROCEDURE — 80197 ASSAY OF TACROLIMUS: CPT

## 2024-04-07 LAB
BK VIRUS SPECIMEN TYPE: NORMAL
BKV DNA # SPEC NAA+PROBE: NOT DETECTED IU/ML

## 2024-04-24 ENCOUNTER — THERAPY VISIT (OUTPATIENT)
Dept: PHYSICAL THERAPY | Facility: REHABILITATION | Age: 69
End: 2024-04-24
Attending: PHYSICAL MEDICINE & REHABILITATION
Payer: COMMERCIAL

## 2024-04-24 DIAGNOSIS — M79.18 MYOFASCIAL PAIN: ICD-10-CM

## 2024-04-24 DIAGNOSIS — M48.061 FORAMINAL STENOSIS OF LUMBAR REGION: ICD-10-CM

## 2024-04-24 DIAGNOSIS — M48.061 STENOSIS OF LATERAL RECESS OF LUMBAR SPINE: ICD-10-CM

## 2024-04-24 DIAGNOSIS — M47.816 LUMBAR FACET ARTHROPATHY: ICD-10-CM

## 2024-04-24 DIAGNOSIS — M54.16 LUMBAR RADICULAR PAIN: ICD-10-CM

## 2024-04-24 PROCEDURE — 97162 PT EVAL MOD COMPLEX 30 MIN: CPT | Mod: GP

## 2024-04-24 PROCEDURE — 97112 NEUROMUSCULAR REEDUCATION: CPT | Mod: GP

## 2024-04-24 NOTE — PROGRESS NOTES
PHYSICAL THERAPY EVALUATION  Type of Visit: Evaluation    See electronic medical record for Abuse and Falls Screening details.    Subjective       Casey reports that his left low back started to bother him a couple of years ago with some lefts-sided radicular pain and no apparent mechanism of injury. Initially it would come and go, but since October of 2023, it has gotten steadily worse. He reports the most aggravating task is sleeping, and he needs to sleep in the recliner which is much better. He also reports that extended sitting (60+ minutes) is aggravating. He reports that sitting lumbar rotation AROM (taught to him by his chiropractor) is helpful, but he hasn't he tried heat/ice or medications. He had an EMG done which showed neuropathy on both lower legs, and then he had an MRI done which showed lateral and spinal canal stenosis. He then met with a spine specialist who referred him to PT for a trial of conservative treatment. Worst pain: 6/10. Best pain: 1/10. Current pain: 3/10. He endorses the radicular pain down the back of his leg to his knee, but he has difficulty differentiating between the neuropathy in the lower leg vs. distal radicular symptoms. He denies any bowel or bladder changes, and he also denies any saddle anesthesia. His primary goal for PT is to be able to sleep in a bed or sit for extended periods without symptom exacerbation.  Presenting condition or subjective complaint: Back issue (left side)  Date of onset: 04/03/24 (Date recorded is date of referral, though patient reports long history of symptoms.)    Relevant medical history: Anemia; Arthritis; High blood pressure; Kidney disease; Overweight; Pain at night or rest; Rheumatoid arthritis   Dates & types of surgery: Liver and kidney transplant 6/6/23. Hernia surgery 12/9/23.    Prior diagnostic imaging/testing results: MRI     Prior therapy history for the same diagnosis, illness or injury: No      Prior Level of Function  Transfers:  Independent  Ambulation: Independent  ADL: Independent  IADL:  independent    Living Environment  Social support: With a significant other or spouse   Type of home: House; Multi-level; Basement   Stairs to enter the home: No       Ramp: Yes   Stairs inside the home: Yes 0 (2 sets) Is there a railing: Yes   Help at home: Self Cares (home health aide/personal care attendant, family, etc); Home and Yard maintenance tasks  Equipment owned: Walker; Walker with wheels; Grab bars; Raised toilet seat     Employment: No    Hobbies/Interests: Hunting/fishing/golfing    Patient goals for therapy: Lay in bed for a complete night    Pain assessment:  see subjective report     Objective   LUMBAR SPINE EVALUATION  PAIN: see subjective report  INTEGUMENTARY (edema, incisions):   POSTURE:   GAIT:   Weightbearing Status:   Assistive Device(s):   Gait Deviations:   BALANCE/PROPRIOCEPTION:   WEIGHTBEARING ALIGNMENT:   NON-WEIGHTBEARING ALIGNMENT:    ROM:   (Degrees) Left AROM Left PROM  Right AROM Right PROM   Hip Flexion       Hip Extension       Hip Abduction       Hip Adduction       Hip Internal Rotation       Hip External Rotation       Knee Flexion       Knee Extension       Lumbar Side glide     Lumbar Flexion WNL (painful)   Lumbar Extension WNL (painful)   Lumbar Rotation Left: 25% (recreates symptoms). Right: 50%.   Lumbar Side-bending Left: distal thigh. Right: distal thigh.   Pain:   End feel:   PELVIC/SI SCREEN:   STRENGTH:   - Hip abduction/adduction and knee flexion: 5/5 bilateral  - Hip extension: notable hip drop with single leg lift from bridge position bilaterally  - Core: unable to perform supine-to-sit without upper extremity assist  MYOTOMES:    Left Right   T12-L3 (Hip Flexion) 5- 5   L2-4 (Quads)  5 5   L4 (Ankle DF) 5 5   L5 (Great Toe Ext) 5 5   S1 (Toe Raise) 4+ 5     DTR S:    Left Right   C5 (Biceps)     C6 (Brachioradialis)     C7 (Triceps)     L4 (Quad) 0 0   S1 (Achilles) 0 0     CORD SIGNS: Durán  negative L, Durán negative R, ankle clonus negative bilateral  DERMATOMES: WNL  NEURAL TENSION:  slump - positive on left, negative on right; SLR - positive on left, negative on right.  FLEXIBILITY: Decreased hamstrings L, Decreased hamstrings R  LUMBAR/HIP Special Tests:    PELVIS/SI SPECIAL TESTS:   FUNCTIONAL TESTS:   PALPATION:   SPINAL SEGMENTAL CONCLUSIONS:       Assessment & Plan   CLINICAL IMPRESSIONS  Medical Diagnosis: Lumbar radicular pain  Foraminal stenosis of lumbar region  Stenosis of lateral recess of lumbar spine  Lumbar facet arthropathy  Myofascial pain    Treatment Diagnosis: Low back pain with left-sided radicular symptoms   Impression/Assessment: Patient is a 68 year old male with low back pain and left-sided radicular symptom complaints.  The following significant findings have been identified: Pain, Decreased ROM/flexibility, Decreased strength, Impaired gait, Impaired muscle performance, and Decreased activity tolerance. These impairments interfere with their ability to perform recreational activities, community mobility, and sleep  as compared to previous level of function.     Clinical Decision Making (Complexity):  Clinical Presentation: Stable/Uncomplicated  Clinical Presentation Rationale: based on medical and personal factors listed in PT evaluation  Clinical Decision Making (Complexity): Moderate complexity    PLAN OF CARE  Treatment Interventions:  Modalities: Cryotherapy, E-stim, Hot Pack, Mechanical Traction  Interventions: Manual Therapy, Neuromuscular Re-education, Therapeutic Activity, Therapeutic Exercise, Self-Care/Home Management    Long Term Goals     PT Goal 1  Goal Identifier: HEP  Goal Description: Time will demonstrate mastery of (pdiszd-sw-lj need for cueing) and compliance with (completion on at least 5/7 days/week) his home exercise program to facilitate increased rate of improvement.  Goal Progress: In progress  Target Date: 05/22/24  PT Goal 2  Goal Identifier:  RAYMUNDO  Goal Description: Casey will demonstrate improved function as evidenced by an improved (decreased) RAYMUNDO score of less than or equal to 5%.  Goal Progress: 24%  Target Date: 06/26/24  PT Goal 3  Goal Identifier: Sleep  Goal Description: Casey will demonstrate improved tolerance to sleeping in a bad as evidenced by his ability to sleep through the night with less than 1 hour of disturbance on at least 5/7 nights per week in his bed.  Goal Progress: Unable  Target Date: 06/26/24  PT Goal 4  Goal Identifier: Walking  Goal Description: Casey will demonstrate improved tolerance to functional mobility as evidenced by his ability to walk up to 20 minutes with self-report back and leg pain no higher than 2 out of 10.  Goal Progress: In progress  Target Date: 06/26/24      Frequency of Treatment: 1 time per week  Duration of Treatment: 8 weeks    Recommended Referrals to Other Professionals:  none  Education Assessment:   Learner/Method: Patient;Listening;Demonstration;Pictures/Video;No Barriers to Learning    Risks and benefits of evaluation/treatment have been explained.   Patient/Family/caregiver agrees with Plan of Care.     Evaluation Time:     PT Eval, Moderate Complexity Minutes (00874): 26       Signing Clinician: Syd Grove, PT      [facility]                                                                                   OUTPATIENT PHYSICAL THERAPY      PLAN OF TREATMENT FOR OUTPATIENT REHABILITATION   Patient's Last Name, First Name, ASHLYNCHRISTINA  Damion Quinones YOB: 1955   Provider's Name   [facility]   Medical Record No.  [id number]     Onset Date: 04/03/24 (Date recorded is date of referral, though patient reports long history of symptoms.)  Start of Care Date: 04/24/24     Medical Diagnosis:  Lumbar radicular pain  Foraminal stenosis of lumbar region  Stenosis of lateral recess of lumbar spine  Lumbar facet arthropathy  Myofascial  pain      PT Treatment Diagnosis:  Low back pain with left-sided radicular symptoms Plan of Treatment  Frequency/Duration: 1 time per week/ 8 weeks    Certification date from 04/24/24 to 06/26/24         See note for plan of treatment details and functional goals     Syd Grove, PT                         I CERTIFY THE NEED FOR THESE SERVICES FURNISHED UNDER        THIS PLAN OF TREATMENT AND WHILE UNDER MY CARE     (Physician attestation of this document indicates review and certification of the therapy plan).              Referring Provider:  Abdulaziz Mejia    Initial Assessment  See Epic Evaluation- Start of Care Date: 04/24/24

## 2024-04-29 ENCOUNTER — LAB (OUTPATIENT)
Dept: LAB | Facility: CLINIC | Age: 69
End: 2024-04-29
Payer: COMMERCIAL

## 2024-04-29 DIAGNOSIS — Z79.899 ENCOUNTER FOR LONG-TERM CURRENT USE OF MEDICATION: ICD-10-CM

## 2024-04-29 DIAGNOSIS — D63.1 ANEMIA IN STAGE 2 CHRONIC KIDNEY DISEASE: ICD-10-CM

## 2024-04-29 DIAGNOSIS — Z48.298 AFTERCARE FOLLOWING ORGAN TRANSPLANT: ICD-10-CM

## 2024-04-29 DIAGNOSIS — Z12.5 SCREENING FOR PROSTATE CANCER: ICD-10-CM

## 2024-04-29 DIAGNOSIS — K70.30 ALCOHOLIC CIRRHOSIS (H): ICD-10-CM

## 2024-04-29 DIAGNOSIS — Z94.4 LIVER REPLACED BY TRANSPLANT (H): ICD-10-CM

## 2024-04-29 DIAGNOSIS — Z94.4 LIVER TRANSPLANT RECIPIENT (H): ICD-10-CM

## 2024-04-29 DIAGNOSIS — N18.2 ANEMIA IN STAGE 2 CHRONIC KIDNEY DISEASE: ICD-10-CM

## 2024-04-29 DIAGNOSIS — Z94.0 KIDNEY REPLACED BY TRANSPLANT: ICD-10-CM

## 2024-04-29 LAB
ALBUMIN MFR UR ELPH: 11.7 MG/DL
ALBUMIN SERPL BCG-MCNC: 4 G/DL (ref 3.5–5.2)
ALBUMIN UR-MCNC: NEGATIVE MG/DL
ALP SERPL-CCNC: 171 U/L (ref 40–150)
ALT SERPL W P-5'-P-CCNC: 22 U/L (ref 0–70)
ANION GAP SERPL CALCULATED.3IONS-SCNC: 11 MMOL/L (ref 7–15)
APPEARANCE UR: CLEAR
AST SERPL W P-5'-P-CCNC: 22 U/L (ref 0–45)
BACTERIA #/AREA URNS HPF: ABNORMAL /HPF
BILIRUB DIRECT SERPL-MCNC: <0.2 MG/DL (ref 0–0.3)
BILIRUB SERPL-MCNC: 0.5 MG/DL
BILIRUB UR QL STRIP: NEGATIVE
BUN SERPL-MCNC: 16.9 MG/DL (ref 8–23)
CALCIUM SERPL-MCNC: 9.4 MG/DL (ref 8.8–10.2)
CHLORIDE SERPL-SCNC: 102 MMOL/L (ref 98–107)
CHOLEST SERPL-MCNC: 119 MG/DL
COLOR UR AUTO: YELLOW
CREAT SERPL-MCNC: 1.04 MG/DL (ref 0.67–1.17)
CREAT UR-MCNC: 83 MG/DL
DEPRECATED HCO3 PLAS-SCNC: 26 MMOL/L (ref 22–29)
EGFRCR SERPLBLD CKD-EPI 2021: 79 ML/MIN/1.73M2
ERYTHROCYTE [DISTWIDTH] IN BLOOD BY AUTOMATED COUNT: 15.1 % (ref 10–15)
FASTING STATUS PATIENT QL REPORTED: YES
FERRITIN SERPL-MCNC: 314 NG/ML (ref 31–409)
GLUCOSE SERPL-MCNC: 94 MG/DL (ref 70–99)
GLUCOSE UR STRIP-MCNC: NEGATIVE MG/DL
HCT VFR BLD AUTO: 47.5 % (ref 40–53)
HDLC SERPL-MCNC: 39 MG/DL
HGB BLD-MCNC: 14.8 G/DL (ref 13.3–17.7)
HGB UR QL STRIP: NEGATIVE
IRON BINDING CAPACITY (ROCHE): 235 UG/DL (ref 240–430)
IRON SATN MFR SERPL: 25 % (ref 15–46)
IRON SERPL-MCNC: 58 UG/DL (ref 61–157)
KETONES UR STRIP-MCNC: NEGATIVE MG/DL
LDLC SERPL CALC-MCNC: 52 MG/DL
LEUKOCYTE ESTERASE UR QL STRIP: ABNORMAL
MAGNESIUM SERPL-MCNC: 1.8 MG/DL (ref 1.7–2.3)
MCH RBC QN AUTO: 28.6 PG (ref 26.5–33)
MCHC RBC AUTO-ENTMCNC: 31.2 G/DL (ref 31.5–36.5)
MCV RBC AUTO: 92 FL (ref 78–100)
NITRATE UR QL: NEGATIVE
NONHDLC SERPL-MCNC: 80 MG/DL
PH UR STRIP: 7 [PH] (ref 5–7)
PHOSPHATE SERPL-MCNC: 3.6 MG/DL (ref 2.5–4.5)
PLATELET # BLD AUTO: 182 10E3/UL (ref 150–450)
POTASSIUM SERPL-SCNC: 4.1 MMOL/L (ref 3.4–5.3)
PROT SERPL-MCNC: 6.2 G/DL (ref 6.4–8.3)
PROT/CREAT 24H UR: 0.14 MG/MG CR (ref 0–0.2)
RBC # BLD AUTO: 5.17 10E6/UL (ref 4.4–5.9)
RBC #/AREA URNS AUTO: ABNORMAL /HPF
SODIUM SERPL-SCNC: 139 MMOL/L (ref 135–145)
SP GR UR STRIP: 1.01 (ref 1–1.03)
SQUAMOUS #/AREA URNS AUTO: ABNORMAL /LPF
TACROLIMUS BLD-MCNC: 6.8 UG/L (ref 5–15)
TME LAST DOSE: NORMAL H
TME LAST DOSE: NORMAL H
TRIGL SERPL-MCNC: 140 MG/DL
UROBILINOGEN UR STRIP-ACNC: 0.2 E.U./DL
WBC # BLD AUTO: 7 10E3/UL (ref 4–11)
WBC #/AREA URNS AUTO: ABNORMAL /HPF

## 2024-04-29 PROCEDURE — 87517 HEPATITIS B DNA QUANT: CPT

## 2024-04-29 PROCEDURE — 84156 ASSAY OF PROTEIN URINE: CPT | Mod: 59

## 2024-04-29 PROCEDURE — 80061 LIPID PANEL: CPT

## 2024-04-29 PROCEDURE — 82728 ASSAY OF FERRITIN: CPT

## 2024-04-29 PROCEDURE — 83550 IRON BINDING TEST: CPT

## 2024-04-29 PROCEDURE — 36415 COLL VENOUS BLD VENIPUNCTURE: CPT

## 2024-04-29 PROCEDURE — 80197 ASSAY OF TACROLIMUS: CPT

## 2024-04-29 PROCEDURE — 80053 COMPREHEN METABOLIC PANEL: CPT

## 2024-04-29 PROCEDURE — 81001 URINALYSIS AUTO W/SCOPE: CPT | Mod: 59

## 2024-04-29 PROCEDURE — G0480 DRUG TEST DEF 1-7 CLASSES: HCPCS | Mod: 90

## 2024-04-29 PROCEDURE — 85027 COMPLETE CBC AUTOMATED: CPT

## 2024-04-29 PROCEDURE — 84100 ASSAY OF PHOSPHORUS: CPT

## 2024-04-29 PROCEDURE — 86832 HLA CLASS I HIGH DEFIN QUAL: CPT

## 2024-04-29 PROCEDURE — 86833 HLA CLASS II HIGH DEFIN QUAL: CPT

## 2024-04-29 PROCEDURE — 82248 BILIRUBIN DIRECT: CPT

## 2024-04-29 PROCEDURE — G0103 PSA SCREENING: HCPCS

## 2024-04-29 PROCEDURE — 83735 ASSAY OF MAGNESIUM: CPT

## 2024-04-29 PROCEDURE — 80180 DRUG SCRN QUAN MYCOPHENOLATE: CPT

## 2024-04-29 PROCEDURE — 83540 ASSAY OF IRON: CPT

## 2024-04-29 PROCEDURE — 87086 URINE CULTURE/COLONY COUNT: CPT

## 2024-04-30 ENCOUNTER — PATIENT OUTREACH (OUTPATIENT)
Dept: CARE COORDINATION | Facility: CLINIC | Age: 69
End: 2024-04-30
Payer: MEDICARE

## 2024-04-30 DIAGNOSIS — Z12.5 SCREENING FOR PROSTATE CANCER: Primary | ICD-10-CM

## 2024-04-30 LAB
BACTERIA UR CULT: NORMAL
MYCOPHENOLATE SERPL LC/MS/MS-MCNC: 1.51 MG/L (ref 1–3.5)
MYCOPHENOLATE-G SERPL LC/MS/MS-MCNC: 51.1 MG/L (ref 30–95)
PSA SERPL DL<=0.01 NG/ML-MCNC: 0.71 NG/ML (ref 0–4.5)
TME LAST DOSE: NORMAL H
TME LAST DOSE: NORMAL H

## 2024-04-30 NOTE — PROGRESS NOTES
Anemia Management Note - Discharge    Referred by Dr. Subhash Dutta on 08/01/2023     Hgb has been stable since 12/17/2023 with no intervention. Las t Iron infusion was given on 1/11/24.     Patient meets criteria for discharge from Anemia Clinic with at least 3 months without FEDERICO Therapy or IV Iron, and/or no Oral Iron therapy for 6 months.        Latest Ref Rng & Units 1/2/2024 1/8/2024 1/11/2024 1/16/2024 2/2/2024 3/25/2024 4/29/2024   Anemia   Hemoglobin 13.3 - 17.7 g/dL 10.4  11.3   11.9  12.1  14.7  14.8    IV Iron Dose    750mg       TSAT 15 - 46 %     24  17  25    Ferritin 31 - 409 ng/mL     807  363  314            Anemia labs discontinued/outside lab/clinic notified (if applicable), Rx discontinued/Therapy Plans cancelled, removed from active patient list, patient and/or caregiver and referring provider notified as appropriate.     Molly Fenton RN   Anemia Services  [facility]  [email or web address]   Office : [phone]  Fax: [phone]

## 2024-05-01 ENCOUNTER — THERAPY VISIT (OUTPATIENT)
Dept: PHYSICAL THERAPY | Facility: REHABILITATION | Age: 69
End: 2024-05-01
Attending: PHYSICAL MEDICINE & REHABILITATION
Payer: COMMERCIAL

## 2024-05-01 DIAGNOSIS — M48.061 FORAMINAL STENOSIS OF LUMBAR REGION: ICD-10-CM

## 2024-05-01 DIAGNOSIS — M47.816 LUMBAR FACET ARTHROPATHY: ICD-10-CM

## 2024-05-01 DIAGNOSIS — M79.18 MYOFASCIAL PAIN: ICD-10-CM

## 2024-05-01 DIAGNOSIS — M54.16 LUMBAR RADICULAR PAIN: Primary | ICD-10-CM

## 2024-05-01 DIAGNOSIS — M48.061 STENOSIS OF LATERAL RECESS OF LUMBAR SPINE: ICD-10-CM

## 2024-05-01 LAB
HBV DNA SERPL NAA+PROBE-ACNC: NOT DETECTED IU/ML
LABORATORY REPORT: NORMAL
PETH INTERPRETATION: NORMAL
PLPETH BLD-MCNC: <10 NG/ML
POPETH BLD-MCNC: <10 NG/ML

## 2024-05-01 PROCEDURE — 97110 THERAPEUTIC EXERCISES: CPT | Mod: GP

## 2024-05-01 PROCEDURE — 97140 MANUAL THERAPY 1/> REGIONS: CPT | Mod: GP

## 2024-05-01 PROCEDURE — 97112 NEUROMUSCULAR REEDUCATION: CPT | Mod: GP

## 2024-05-05 ENCOUNTER — HEALTH MAINTENANCE LETTER (OUTPATIENT)
Age: 69
End: 2024-05-05

## 2024-05-06 ENCOUNTER — TELEPHONE (OUTPATIENT)
Dept: TRANSPLANT | Facility: CLINIC | Age: 69
End: 2024-05-06
Payer: MEDICARE

## 2024-05-06 LAB
DONOR IDENTIFICATION: NORMAL
DSA COMMENTS: NORMAL
DSA PRESENT: NO
DSA TEST METHOD: NORMAL
ORGAN: NORMAL
SA 1  COMMENTS: NORMAL
SA 1 CELL: NORMAL
SA 1 TEST METHOD: NORMAL
SA 2 CELL: NORMAL
SA 2 COMMENTS: NORMAL
SA 2 TEST METHOD: NORMAL
SA1 HI RISK ABY: NORMAL
SA1 MOD RISK ABY: NORMAL
SA2 HI RISK ABY: NORMAL
SA2 MOD RISK ABY: NORMAL
UNACCEPTABLE ANTIGENS: NORMAL

## 2024-05-06 NOTE — TELEPHONE ENCOUNTER
One year post kidney liver transplant  on 6/6/2024     Please update kidney EPIC  lab orders for his annual labs  and his 1 year post labs Dr Marbella Marrero

## 2024-05-06 NOTE — LETTER
OUTPATIENT LABORATORY TEST ORDER     Patient Name: Damion Quinones   YOB: 1955     [facility] MR# [if applicable]: [id number]   Date & Time: May 6, 2024  10:42 AM  Expiration Date: 1 year after date issued      Diagnosis: Kidney Transplant (ICD-10 Z94.0)    Liver Transplant (ICD-10 Z94.4)    Aftercare following organ transplant (ICD-10 Z48.288)    Long term use of medications (ICD-10 Z79.899)    We ask your assistance in obtaining the following laboratory tests, which are part of our routine surveillance program for Solid Organ Transplant patients.     Please fax each result to [phone], same day as resulted/available    Critical lab results page [phone]    Monthly  CBC with platelets  Basic Metabolic Panel (Sodium, Potassium, Chloride, CO2, Creatinine, Urea Nitrogen, glucose, Calcium)  Hepatic Panel  Tacrolimus/Prograf/ drug level     BK Virus by PCR, Quantitative    At 13 & 18 months post-transplant (7/2024 & 12/2024)  PRA/DSA level (mailers provided by the patient)    Every 6 Months    Urine for protein/creatinine    Yearly  Phosphorus  Lipid Panel      If you have any questions, please call [facility]- [phone] or [phone], Fax- [phone].        Subhash Dutta MD

## 2024-05-07 ENCOUNTER — THERAPY VISIT (OUTPATIENT)
Dept: PHYSICAL THERAPY | Facility: REHABILITATION | Age: 69
End: 2024-05-07
Attending: PHYSICAL MEDICINE & REHABILITATION
Payer: COMMERCIAL

## 2024-05-07 DIAGNOSIS — M79.18 MYOFASCIAL PAIN: ICD-10-CM

## 2024-05-07 DIAGNOSIS — M48.061 STENOSIS OF LATERAL RECESS OF LUMBAR SPINE: ICD-10-CM

## 2024-05-07 DIAGNOSIS — M48.061 FORAMINAL STENOSIS OF LUMBAR REGION: ICD-10-CM

## 2024-05-07 DIAGNOSIS — M54.16 LUMBAR RADICULAR PAIN: Primary | ICD-10-CM

## 2024-05-07 DIAGNOSIS — M47.816 LUMBAR FACET ARTHROPATHY: ICD-10-CM

## 2024-05-07 PROCEDURE — 97110 THERAPEUTIC EXERCISES: CPT | Mod: GP

## 2024-05-07 PROCEDURE — 97112 NEUROMUSCULAR REEDUCATION: CPT | Mod: GP

## 2024-05-08 ENCOUNTER — TRANSFERRED RECORDS (OUTPATIENT)
Dept: HEALTH INFORMATION MANAGEMENT | Facility: CLINIC | Age: 69
End: 2024-05-08

## 2024-05-09 ENCOUNTER — MYC MEDICAL ADVICE (OUTPATIENT)
Dept: FAMILY MEDICINE | Facility: CLINIC | Age: 69
End: 2024-05-09
Payer: MEDICARE

## 2024-05-15 ENCOUNTER — THERAPY VISIT (OUTPATIENT)
Dept: PHYSICAL THERAPY | Facility: REHABILITATION | Age: 69
End: 2024-05-15
Attending: PHYSICAL MEDICINE & REHABILITATION
Payer: COMMERCIAL

## 2024-05-15 DIAGNOSIS — Z94.4 LIVER TRANSPLANT RECIPIENT (H): ICD-10-CM

## 2024-05-15 DIAGNOSIS — M47.816 LUMBAR FACET ARTHROPATHY: ICD-10-CM

## 2024-05-15 DIAGNOSIS — M48.061 FORAMINAL STENOSIS OF LUMBAR REGION: ICD-10-CM

## 2024-05-15 DIAGNOSIS — M79.18 MYOFASCIAL PAIN: ICD-10-CM

## 2024-05-15 DIAGNOSIS — M54.16 LUMBAR RADICULAR PAIN: Primary | ICD-10-CM

## 2024-05-15 DIAGNOSIS — M48.061 STENOSIS OF LATERAL RECESS OF LUMBAR SPINE: ICD-10-CM

## 2024-05-15 PROCEDURE — 97112 NEUROMUSCULAR REEDUCATION: CPT | Mod: GP

## 2024-05-15 PROCEDURE — 97110 THERAPEUTIC EXERCISES: CPT | Mod: GP

## 2024-05-15 PROCEDURE — 97140 MANUAL THERAPY 1/> REGIONS: CPT | Mod: GP

## 2024-05-15 RX ORDER — MYCOPHENOLIC ACID 180 MG/1
540 TABLET, DELAYED RELEASE ORAL 2 TIMES DAILY
Qty: 540 TABLET | Refills: 3 | Status: SHIPPED | OUTPATIENT
Start: 2024-05-15

## 2024-05-28 ENCOUNTER — TELEPHONE (OUTPATIENT)
Dept: TRANSPLANT | Facility: CLINIC | Age: 69
End: 2024-05-28
Payer: MEDICARE

## 2024-05-28 ASSESSMENT — ENCOUNTER SYMPTOMS: NEW SYMPTOMS OF CORONARY ARTERY DISEASE: 0

## 2024-05-28 NOTE — TELEPHONE ENCOUNTER
One year post liver kidney transplant         Please update kidney transplant orders in EPIC  and send copy to  patient   Dr Marbella Marrero follow up appt

## 2024-05-28 NOTE — TELEPHONE ENCOUNTER
Current standing lab orders faxed to patients local lab and sent copy of lab letter to patient via [employer].

## 2024-05-28 NOTE — LETTER
OUTPATIENT LABORATORY TEST ORDER   Patient Name: Damion Quinones   YOB: 1955     [facility] MR# [if applicable]: [id number]   Date & Time: May 6, 2024  10:42 AM  Expiration Date: 1 year after date issued    Diagnosis: Kidney Transplant (ICD-10 Z94.0)    Liver Transplant (ICD-10 Z94.4)    Aftercare following organ transplant (ICD-10 Z48.288)    Long term use of medications (ICD-10 Z79.899)    We ask your assistance in obtaining the following laboratory tests, which are part of our routine surveillance program for Solid Organ Transplant patients.     Please fax each result to [phone], same day as resulted/available    Critical lab results page [phone]    Monthly  CBC with platelets  Basic Metabolic Panel (Sodium, Potassium, Chloride, CO2, Creatinine, Urea Nitrogen, glucose, Calcium)  Hepatic Panel  Tacrolimus/Prograf/ drug level     BK Virus by PCR, Quantitative    At 13 & 18 months post-transplant (7/2024 & 12/2024)  PRA/DSA level (mailers provided by the patient)    Every 6 Months    Urine for protein/creatinine    Yearly  Phosphorus  Lipid Panel      If you have any questions, please call [facility]- [phone] or [phone], Fax- [phone].      .  Marbella Marrero MD

## 2024-05-29 ENCOUNTER — THERAPY VISIT (OUTPATIENT)
Dept: PHYSICAL THERAPY | Facility: REHABILITATION | Age: 69
End: 2024-05-29
Attending: PHYSICAL MEDICINE & REHABILITATION
Payer: COMMERCIAL

## 2024-05-29 DIAGNOSIS — M48.061 FORAMINAL STENOSIS OF LUMBAR REGION: ICD-10-CM

## 2024-05-29 DIAGNOSIS — Z23 NEED FOR PROPHYLACTIC VACCINATION AGAINST HEPATITIS B VIRUS: ICD-10-CM

## 2024-05-29 DIAGNOSIS — M79.18 MYOFASCIAL PAIN: ICD-10-CM

## 2024-05-29 DIAGNOSIS — M47.816 LUMBAR FACET ARTHROPATHY: ICD-10-CM

## 2024-05-29 DIAGNOSIS — M54.16 LUMBAR RADICULAR PAIN: Primary | ICD-10-CM

## 2024-05-29 DIAGNOSIS — Z23 NEED FOR SHINGLES VACCINE: ICD-10-CM

## 2024-05-29 DIAGNOSIS — M48.061 STENOSIS OF LATERAL RECESS OF LUMBAR SPINE: ICD-10-CM

## 2024-05-29 PROCEDURE — 97112 NEUROMUSCULAR REEDUCATION: CPT | Mod: GP

## 2024-05-29 PROCEDURE — 97110 THERAPEUTIC EXERCISES: CPT | Mod: GP

## 2024-05-29 NOTE — PROGRESS NOTES
Patient is scheduled for shingrix #2 and Hep B tomorrow. Per note 3/27/24 Hep B series was started.    Is he starting Hep B series over? Looks like he has already had 3 doses.    Orders pended.

## 2024-05-29 NOTE — PROGRESS NOTES
PLAN  Continue therapy per current plan of care.    Beginning/End Dates of Progress Note Reporting Period:  04/24/2024 to 05/29/2024    Referring Provider:  Abdulaziz Mejia           05/29/24 0500   Appointment Info   Signing clinician's name / credentials Syd Grove, PT   Visits Used 5   Medical Diagnosis Lumbar radicular pain  Foraminal stenosis of lumbar region  Stenosis of lateral recess of lumbar spine  Lumbar facet arthropathy  Myofascial pain   PT Tx Diagnosis Low back pain with left-sided radicular symptoms   Other pertinent information Evaluate and Treat: Left lumbar radicula rpain with Lumb foraminal stenosis and lat recess stenosis  Lumbar program and nerve glides    Treatment Goals:  Improve posture  Increase strength  Decrease pain  Increase ROM  Improve function    Up to  1-2 x/week for 8 weeks    Exercise:  As indicated by therapy evaluation  Active/Assistive ROM  General conditioning  Passive ROM  Posture/Body mechanics training  Strengthening/Stabilization  Stretching/Flexibilty  Nerve glides        Modalities: (limited)  Ultrasound      Procedures:  Neuromuscular re-education   Progress Note/Certification   Start of Care Date 04/24/24   Onset of illness/injury or Date of Surgery 04/03/24  (Date recorded is date of referral, though patient reports long history of symptoms.)   Therapy Frequency 1 time per week   Predicted Duration 8 weeks   Certification date from 04/24/24   Certification date to 06/26/24   Progress Note Due Date 06/26/24   Progress Note Completed Date 05/29/24   PT Goal 1   Goal Identifier HEP   Goal Description Time will demonstrate mastery of (rmxahl-kg-sp need for cueing) and compliance with (completion on at least 5/7 days/week) his home exercise program to facilitate increased rate of improvement.   Goal Progress 5 days per week   Target Date 05/22/24   Date Met 05/29/24   PT Goal 2   Goal Identifier RAYMUNDO   Goal Description Casey will demonstrate improved function as  "evidenced by an improved (decreased) RAYMUNDO score of less than or equal to 5%.   Goal Progress 18%   Target Date 06/26/24   PT Goal 3   Goal Identifier Sleep   Goal Description Casey will demonstrate improved tolerance to sleeping in a bad as evidenced by his ability to sleep through the night with less than 1 hour of disturbance on at least 5/7 nights per week in his bed.   Goal Progress Up to 2.5 hours disturbance per night   Target Date 06/26/24   PT Goal 4   Goal Identifier Walking   Goal Description Casey will demonstrate improved tolerance to functional mobility as evidenced by his ability to walk up to 20 minutes with self-report back and leg pain no higher than 2 out of 10.   Goal Progress 60 minutes with pain no more than 2/10   Target Date 06/26/24   Date Met 05/29/24   Subjective Report   Subjective Report Casey continues largely unchanged symptoms since last visit, however, he reports approximately 50% improvement overall since start of physical therapy.   Objective Measures   Objective Measures Objective Measure 1;Objective Measure 2;Objective Measure 3;Objective Measure 4   Objective Measure 1   Objective Measure Lumbar Rotation AROM   Details Left: 75% (\"pull\" in low back without radiation).  Right: 75-99%.   Objective Measure 2   Objective Measure Lower Extremity Strength   Details Hip flexion: 5/5 bilateral.  Hip extension: minimal hip drop with single leg lift from bridge position bilaterally.  Ankle plantarflexion: 5/5 bilateral.  (All other bilateral hip abduction/adduction, knee flexion/extension, ankle dorsiflexion, and great toe extension: 5/5)   Objective Measure 3   Objective Measure Worst Pain   Details Since last visit: 4/10   Objective Measure 4   Objective Measure Neural Tension Testing   Details Slump and SLR: negative bilaterally.   Treatment Interventions (PT)   Interventions Therapeutic Procedure/Exercise;Neuromuscular Re-education   Therapeutic Procedure/Exercise   Therapeutic Procedures: " "strength, endurance, ROM, flexibility minutes (73400) 24   Therapeutic Procedures Ther Proc 2;Ther Proc 3;Ther Proc 4;Ther Proc 5;Ther Proc 6;Ther Proc 7   Ther Proc 1 Nustep + subjective report + discussion regarding goals and progress   Ther Proc 1 - Details 7 minutes (level 5)   Ther Proc 2 Lower trunk rotation   Ther Proc 2 - Details 3 minutes   Ther Proc 3 Standing hip extension, abduction, and flexion   Ther Proc 3 - Details 2 x 12 each bilateral   Ther Proc 4 Objective measures   Ther Proc 4 - Details See objective measures   Skilled Intervention Exercises to increase low back and hip/lower extremity mobility/flexibility and strength   Patient Response/Progress Increased reps. Casey tolerated all exercises and progressions well without increased symptoms and with cueing for proper form.   Neuromuscular Re-education   Neuromuscular re-ed of mvmt, balance, coord, kinesthetic sense, posture, proprioception minutes (98084) 14   Neuromuscular Re-education Neuro Re-ed 2;Neuro Re-ed 3   Neuro Re-ed 1 TA set   Neuro Re-ed 1 - Details With leg extension: 3 x 10 (cueing to control speed, keep breathing, and maintain core brace)   Neuro Re-ed 2 Supine sciatic nerve glides (knee extension + ankle dorsiflexion)   Neuro Re-ed 2 - Details 1 x 20 bilateral   Neuro Re-ed 3 Pelvic rocks   Neuro Re-ed 3 - Details 1 x 20 anterior/posterior (cueing to avoid excessive muscle engagement and to focus more on \"gentle motion\")   Skilled Intervention Nerve glides to improve nerve mobility and decrease radicular symptoms.  Exercises to increase core body awareness and muscle control/activation.   Patient Response/Progress Progressed TA sets to include leg extension. Casey tolerated all exercises well without increased symptoms and with continued cueing for proper form.   Manual Therapy   Manual Therapy 1 Bed mobility training   Manual Therapy 1 - Details Bed mobility training with focus on log-roll technique and trial/practice afterward " "with cueing for proper form   Skilled Intervention Bed mobility training to increase tolerance to and ease with bed mobility   Patient Response/Progress Casey demonstrated increased ease of movement and reported decreased discomfort with bed mobility following training.   Education   Learner/Method Patient;Listening;Demonstration;Pictures/Video;No Barriers to Learning   Plan   Home program See PTRx.   Plan for next session Continue to progress B hip strengthening; trial monster walks and progress nerve glides.  Manual therapy as appropriate.   Comments   Comments Assessment: Casey reports feeling like he has seen approximately 50% improvement overall. He specifically notes feeling like the symptoms of \"sciatica\" in his leg have lessened, and he \"feels it more\" in his back now. This is supported by negative neural tension testing today. He demonstrates notably improved lumbar rotation AROM and lower extremity strength on testing today. He also reports being able to walk for up to 60 minutes with pain no higher than 2/10, however, his sleep is still disturbed up to 2.5 hours per night. He has improved (decreased) his RAYMUNDO score to 18%, indicating marginally improved function. He will continue to benefit from physical therapy to improve his function and progress toward his remaining goals.   Total Session Time   Timed Code Treatment Minutes 38   Total Treatment Time (sum of timed and untimed services) 38     "

## 2024-05-30 ENCOUNTER — LAB (OUTPATIENT)
Dept: LAB | Facility: CLINIC | Age: 69
End: 2024-05-30
Payer: COMMERCIAL

## 2024-05-30 ENCOUNTER — ALLIED HEALTH/NURSE VISIT (OUTPATIENT)
Dept: FAMILY MEDICINE | Facility: CLINIC | Age: 69
End: 2024-05-30
Payer: COMMERCIAL

## 2024-05-30 DIAGNOSIS — Z94.0 KIDNEY REPLACED BY TRANSPLANT: ICD-10-CM

## 2024-05-30 DIAGNOSIS — K70.30 ALCOHOLIC CIRRHOSIS (H): ICD-10-CM

## 2024-05-30 DIAGNOSIS — L40.50 PSORIATIC ARTHRITIS (H): ICD-10-CM

## 2024-05-30 DIAGNOSIS — Z94.4 LIVER TRANSPLANT RECIPIENT (H): Primary | ICD-10-CM

## 2024-05-30 DIAGNOSIS — M1A.9XX1 TOPHACEOUS GOUT: ICD-10-CM

## 2024-05-30 DIAGNOSIS — Z23 NEED FOR VACCINATION: Primary | ICD-10-CM

## 2024-05-30 DIAGNOSIS — Z94.4 HISTORY OF LIVER TRANSPLANT (H): ICD-10-CM

## 2024-05-30 DIAGNOSIS — I77.82 ANCA-ASSOCIATED VASCULITIS (H): ICD-10-CM

## 2024-05-30 DIAGNOSIS — Z94.0 HISTORY OF KIDNEY TRANSPLANT: ICD-10-CM

## 2024-05-30 DIAGNOSIS — Z94.4 LIVER REPLACED BY TRANSPLANT (H): ICD-10-CM

## 2024-05-30 LAB
ALBUMIN UR-MCNC: NEGATIVE MG/DL
APPEARANCE UR: CLEAR
BACTERIA #/AREA URNS HPF: ABNORMAL /HPF
BILIRUB UR QL STRIP: NEGATIVE
COLOR UR AUTO: YELLOW
ERYTHROCYTE [DISTWIDTH] IN BLOOD BY AUTOMATED COUNT: 14.9 % (ref 10–15)
GLUCOSE UR STRIP-MCNC: NEGATIVE MG/DL
HCT VFR BLD AUTO: 48 % (ref 40–53)
HGB BLD-MCNC: 15 G/DL (ref 13.3–17.7)
HGB UR QL STRIP: ABNORMAL
KETONES UR STRIP-MCNC: NEGATIVE MG/DL
LEUKOCYTE ESTERASE UR QL STRIP: ABNORMAL
Lab: NORMAL
MCH RBC QN AUTO: 28.6 PG (ref 26.5–33)
MCHC RBC AUTO-ENTMCNC: 31.3 G/DL (ref 31.5–36.5)
MCV RBC AUTO: 92 FL (ref 78–100)
NITRATE UR QL: NEGATIVE
PERFORMING LABORATORY: NORMAL
PH UR STRIP: 7 [PH] (ref 5–7)
PLATELET # BLD AUTO: 164 10E3/UL (ref 150–450)
RBC # BLD AUTO: 5.24 10E6/UL (ref 4.4–5.9)
RBC #/AREA URNS AUTO: ABNORMAL /HPF
SP GR UR STRIP: 1.01 (ref 1–1.03)
SQUAMOUS #/AREA URNS AUTO: ABNORMAL /LPF
TACROLIMUS BLD-MCNC: 7.8 UG/L (ref 5–15)
TEST NAME: NORMAL
TME LAST DOSE: NORMAL H
TME LAST DOSE: NORMAL H
UROBILINOGEN UR STRIP-ACNC: 0.2 E.U./DL
WBC # BLD AUTO: 6.1 10E3/UL (ref 4–11)
WBC #/AREA URNS AUTO: ABNORMAL /HPF

## 2024-05-30 PROCEDURE — 90746 HEPB VACCINE 3 DOSE ADULT IM: CPT

## 2024-05-30 PROCEDURE — 80180 DRUG SCRN QUAN MYCOPHENOLATE: CPT

## 2024-05-30 PROCEDURE — 36415 COLL VENOUS BLD VENIPUNCTURE: CPT

## 2024-05-30 PROCEDURE — 82248 BILIRUBIN DIRECT: CPT

## 2024-05-30 PROCEDURE — 90750 HZV VACC RECOMBINANT IM: CPT

## 2024-05-30 PROCEDURE — 90471 IMMUNIZATION ADMIN: CPT

## 2024-05-30 PROCEDURE — 80053 COMPREHEN METABOLIC PANEL: CPT

## 2024-05-30 PROCEDURE — 86832 HLA CLASS I HIGH DEFIN QUAL: CPT

## 2024-05-30 PROCEDURE — 83735 ASSAY OF MAGNESIUM: CPT

## 2024-05-30 PROCEDURE — 85027 COMPLETE CBC AUTOMATED: CPT

## 2024-05-30 PROCEDURE — 90472 IMMUNIZATION ADMIN EACH ADD: CPT

## 2024-05-30 PROCEDURE — 84550 ASSAY OF BLOOD/URIC ACID: CPT

## 2024-05-30 PROCEDURE — 83876 ASSAY MYELOPEROXIDASE: CPT

## 2024-05-30 PROCEDURE — G0480 DRUG TEST DEF 1-7 CLASSES: HCPCS

## 2024-05-30 PROCEDURE — 80197 ASSAY OF TACROLIMUS: CPT

## 2024-05-30 PROCEDURE — 86833 HLA CLASS II HIGH DEFIN QUAL: CPT

## 2024-05-30 PROCEDURE — 84100 ASSAY OF PHOSPHORUS: CPT

## 2024-05-30 NOTE — PROGRESS NOTES
Prior to immunization administration, verified patients identity using patient s name and date of birth. Please see Immunization Activity for additional information.     Screening Questionnaire for Adult Immunization    Are you sick today?   No   Do you have allergies to medications, food, a vaccine component or latex?   No   Have you ever had a serious reaction after receiving a vaccination?   No   Do you have a long-term health problem with heart, lung, kidney, or metabolic disease (e.g., diabetes), asthma, a blood disorder, no spleen, complement component deficiency, a cochlear implant, or a spinal fluid leak?  Are you on long-term aspirin therapy?   Transplant. Dr ordered vaccines   Do you have cancer, leukemia, HIV/AIDS, or any other immune system problem?   No   Do you have a parent, brother, or sister with an immune system problem?   No   In the past 3 months, have you taken medications that affect  your immune system, such as prednisone, other steroids, or anticancer drugs; drugs for the treatment of rheumatoid arthritis, Crohn s disease, or psoriasis; or have you had radiation treatments?   No   Have you had a seizure, or a brain or other nervous system problem?   No   During the past year, have you received a transfusion of blood or blood    products, or been given immune (gamma) globulin or antiviral drug?   No   For women: Are you pregnant or is there a chance you could become       pregnant during the next month?   No   Have you received any vaccinations in the past 4 weeks?   No     Immunization questionnaire answers were all negative.    I have reviewed the following standing orders:   This patient is due and qualifies for the Hepatitis B vaccine.    Click here for Hepatitis B Standing Order    I have reviewed the vaccines inclusion and exclusion criteria; No concerns regarding eligibility.         This patient is due and qualifies for the Zoster vaccine.    Click here for Zoster Standing Order    I  have reviewed the vaccines inclusion and exclusion criteria; No concerns regarding eligibility.     Patient instructed to remain in clinic for 15 minutes afterwards, and to report any adverse reactions.     Screening performed by Consuelo Ware MA on 5/30/2024 at 7:54 AM.

## 2024-05-31 LAB
ALBUMIN MFR UR ELPH: 13.8 MG/DL
ALBUMIN SERPL BCG-MCNC: 4.2 G/DL (ref 3.5–5.2)
ALP SERPL-CCNC: 165 U/L (ref 40–150)
ALT SERPL W P-5'-P-CCNC: 26 U/L (ref 0–70)
ANION GAP SERPL CALCULATED.3IONS-SCNC: 11 MMOL/L (ref 7–15)
AST SERPL W P-5'-P-CCNC: 24 U/L (ref 0–45)
BILIRUB DIRECT SERPL-MCNC: <0.2 MG/DL (ref 0–0.3)
BILIRUB SERPL-MCNC: 0.6 MG/DL
BUN SERPL-MCNC: 16.3 MG/DL (ref 8–23)
CALCIUM SERPL-MCNC: 9.2 MG/DL (ref 8.8–10.2)
CHLORIDE SERPL-SCNC: 102 MMOL/L (ref 98–107)
CREAT SERPL-MCNC: 0.95 MG/DL (ref 0.67–1.17)
CREAT UR-MCNC: 95.1 MG/DL
DEPRECATED HCO3 PLAS-SCNC: 26 MMOL/L (ref 22–29)
EGFRCR SERPLBLD CKD-EPI 2021: 88 ML/MIN/1.73M2
GLUCOSE SERPL-MCNC: 112 MG/DL (ref 70–99)
MAGNESIUM SERPL-MCNC: 1.8 MG/DL (ref 1.7–2.3)
MYELOPEROXIDASE AB SER IA-ACNC: 0.3 U/ML
MYELOPEROXIDASE AB SER IA-ACNC: NEGATIVE
PHOSPHATE SERPL-MCNC: 3.5 MG/DL (ref 2.5–4.5)
POTASSIUM SERPL-SCNC: 4.3 MMOL/L (ref 3.4–5.3)
PROT SERPL-MCNC: 6.2 G/DL (ref 6.4–8.3)
PROT/CREAT 24H UR: 0.15 MG/MG CR (ref 0–0.2)
PROTEINASE3 AB SER IA-ACNC: <1 U/ML
PROTEINASE3 AB SER IA-ACNC: NEGATIVE
SODIUM SERPL-SCNC: 139 MMOL/L (ref 135–145)
URATE SERPL-MCNC: 4.3 MG/DL (ref 3.4–7)

## 2024-06-03 LAB — MISCELLANEOUS TEST 1 (ARUP): NORMAL

## 2024-06-04 ENCOUNTER — THERAPY VISIT (OUTPATIENT)
Dept: PHYSICAL THERAPY | Facility: REHABILITATION | Age: 69
End: 2024-06-04
Payer: COMMERCIAL

## 2024-06-04 DIAGNOSIS — M48.061 STENOSIS OF LATERAL RECESS OF LUMBAR SPINE: ICD-10-CM

## 2024-06-04 DIAGNOSIS — M54.16 LUMBAR RADICULAR PAIN: Primary | ICD-10-CM

## 2024-06-04 DIAGNOSIS — M48.061 FORAMINAL STENOSIS OF LUMBAR REGION: ICD-10-CM

## 2024-06-04 DIAGNOSIS — M79.18 MYOFASCIAL PAIN: ICD-10-CM

## 2024-06-04 DIAGNOSIS — M47.816 LUMBAR FACET ARTHROPATHY: ICD-10-CM

## 2024-06-04 PROCEDURE — 97110 THERAPEUTIC EXERCISES: CPT | Mod: GP

## 2024-06-04 PROCEDURE — 97112 NEUROMUSCULAR REEDUCATION: CPT | Mod: GP

## 2024-06-06 NOTE — TELEPHONE ENCOUNTER
RECORDS RECEIVED FROM: [facility] in [place]    Appt Date: 6/18/2024     Hypothyroidism, unspecified type   Avascular necrosis of bone (H)     Action    Action Taken 6/6/2024 10:07AM FEDERICO     I called [facility] in [place] [phone]- I sent a request to Fax: [phone]       NOTES (FOR ALL VISITS) STATUS DETAILS   OFFICE NOTES from referring provider Internal   Lolis Bermudez MD      MEDICATION LIST Internal    IMAGING      XR (Chest) Internal Xray Chest 12/12/2023 more in PACS   CT (HEAD/NECK/CHEST/ABDOMEN) Internal CT Chest 12/28/2023     CT abdomen pelvis 12/28/2023, more in PACS   LABS     DIABETES: HBGA1C, CREATININE, FASTING LIPIDS, MICROALBUMIN URINE, POTASSIUM, TSH, T4    THYROID: TSH, T4, CBC, THYRODLONULIN, TOTAL T3, FREE T4, CALCITONIN, CEA Internal

## 2024-06-10 NOTE — CONFIDENTIAL NOTE
duplicate        RECORDS RECEIVED FROM: [facility] in [place]    Appt Date: 6/18/2024      Hypothyroidism, unspecified type   Avascular necrosis of bone (H)      Action     Action Taken 6/6/2024 10:07AM FEDERICO REYES called [facility] in [place] [phone] I sent a request to Fax: [phone]       NOTES (FOR ALL VISITS) STATUS DETAILS   OFFICE NOTES from referring provider Internal   Lolis Bermudez MD      MEDICATION LIST Internal     IMAGING        XR (Chest) Internal Xray Chest 12/12/2023 more in PACS   CT (HEAD/NECK/CHEST/ABDOMEN) Internal CT Chest 12/28/2023      CT abdomen pelvis 12/28/2023, more in PACS   LABS       DIABETES: HBGA1C, CREATININE, FASTING LIPIDS, MICROALBUMIN URINE, POTASSIUM, TSH, T4     THYROID: TSH, T4, CBC, THYRODLONULIN, TOTAL T3, FREE T4, CALCITONIN, CEA Internal

## 2024-06-17 ENCOUNTER — OFFICE VISIT (OUTPATIENT)
Dept: PHYSICAL MEDICINE AND REHAB | Facility: CLINIC | Age: 69
End: 2024-06-17
Payer: COMMERCIAL

## 2024-06-17 VITALS — OXYGEN SATURATION: 95 % | HEART RATE: 67 BPM | DIASTOLIC BLOOD PRESSURE: 70 MMHG | SYSTOLIC BLOOD PRESSURE: 107 MMHG

## 2024-06-17 DIAGNOSIS — M48.061 FORAMINAL STENOSIS OF LUMBAR REGION: ICD-10-CM

## 2024-06-17 DIAGNOSIS — M48.10 DISH (DIFFUSE IDIOPATHIC SKELETAL HYPEROSTOSIS): ICD-10-CM

## 2024-06-17 DIAGNOSIS — M47.816 LUMBAR FACET ARTHROPATHY: Primary | ICD-10-CM

## 2024-06-17 DIAGNOSIS — M54.16 LUMBAR RADICULAR PAIN: ICD-10-CM

## 2024-06-17 DIAGNOSIS — M48.061 STENOSIS OF LATERAL RECESS OF LUMBAR SPINE: ICD-10-CM

## 2024-06-17 DIAGNOSIS — M79.18 MYOFASCIAL PAIN: ICD-10-CM

## 2024-06-17 PROCEDURE — 99214 OFFICE O/P EST MOD 30 MIN: CPT | Performed by: PHYSICAL MEDICINE & REHABILITATION

## 2024-06-17 ASSESSMENT — PAIN SCALES - GENERAL: PAINLEVEL: MILD PAIN (3)

## 2024-06-17 NOTE — LETTER
6/17/2024      Damion Quinones   [id number] [address]      Dear Colleague,    Thank you for referring your patient, Damion Quinones, to the [facility]. Please see a copy of my visit note below.    Assessment/Plan:      Casey was seen today for follow up.    Diagnoses and all orders for this visit:    Lumbar facet arthropathy  -     PAIN Medial Branch Block Lumbar One Level Bilateral; Future    Lumbar radicular pain    Foraminal stenosis of lumbar region    Myofascial pain    Stenosis of lateral recess of lumbar spine    DISH (diffuse idiopathic skeletal hyperostosis)         Assessment: Pleasant 68 year old male with history of coronary artery disease, hypertension, hypothyroid, Hepatocellular carcinoma status post liver and kidney transplant 1 year ago with:     1.  Persistent chronic lumbar spine pain at the lumbosacral junction worse first thing in the morning better throughout the day and worse at night consistent with facet arthropathy.  Severe facet arthropathy L5-S1 bilaterally.  Positive facet loading on exam today and now only having axial low back pain with some left gluteal pain intermittently.  Gluteal pain likely referred from the facets.  Pain had improved with physical therapy and now worsened again.    2.  Previous left lower extremity radicular pain.  He has multilevel degenerative disc disease through the lumbar spine with severe left foraminal stenosis at L5-S1 and lateral recess stenosis with facet arthropathy and facet joint edema.  EMG findings positive for left S1 radiculopathy I also question if the L5 nerve is involved given the MRI findings.  Radicular pain has dramatically improved/resolved following physical therapy and doing home exercises.     3.  Myofascial pain lumbar spine.       4.diffuse idiopathic skeletal hyperostosis with ankylosis of the L4-5 level as well as much of the upper lumbar spine and lower thoracic spine.      5.  Paresthesias  in the feet consistent with peripheral polyneuropathy.  On gabapentin  Per PCP.      Discussion:    1.  I discussed the diagnosis and treatment options.  At this time his pain is most consistent with facet arthropathy in the lumbar spine given the ankylosis of L4-5 the L5-S1 facet joints with edema is likely the biggest contributor to his pain.  His radicular pain has improved with physical therapy but will monitor.  He will continue to monitor for weakness.    2.  Recommend bilaterally L4, 5 medial branch blocks.    3.  Continue PT home exercises.    4.  No new medications at this time.  He is not interested in any medications.    6.  Can consider lumbar epidural steroid injection if he has persistent gluteal pain which could represent proximal radicular pain but likely represents facet mediated referral.    7.  Follow-up at his earliest convenience for injection.    It was our pleasure caring for your patient today, if there any questions or concerns please do not hesitate to contact us.    Over 30 minutes were spent on the date of the encounter performing chart review, patient visit and documentation in addition to any procedure.    Subjective:   Patient ID: Damion Quinones is a 68 year old male.    History of Present Illness: Patient presents with his wife today for follow-up of lumbar spine pain left gluteal pain as well as foot paresthesias.  Is previously having left lower extremity pain into the thigh as well which is proximal radicular in nature.  I done physical therapy and his pain improved during therapy with the low back as well as left leg which continues to be improved but having worsening low back pain now that stopped therapy doing home PT exercises.  Pain is worse first thing in the morning gets up and get moving its better and worse at the end of the day again.  After sitting he also has significant pain and prolonged walking.  Pain is a 3/10 at worst best and today.  Better with doing PT  exercises.  Tylenol also as needed.  Taking gabapentin per PCP.  He has numbness and tingling in the feet which is not new.      Imaging: I reviewed the MRI lumbar spine images from February 2024.  This reveals significant degenerative disc disease L4-5 L2-3 L1-2 and moderate L3-4.  No high-grade central stenosis.  There is significant facet arthropathy severe L5-S1 left greater than right with synovial cyst on the left resulting in left lateral recess stenosis as well as severe left foraminal stenosis.  Ankylosis L4-5 level.    CT scan abdomen pelvis personally reviewed from December 2023 for medical decision-making purposes.  Ankylosis anterior osteophyte L4-5.  Severe facet arthropathy L5-S1 bilaterally.  DISH throughout the lower thoracic upper lumbar spine.  Hips mild osteoarthritis changes.    Review of Systems: Pertinent positives: Paresthesias weakness.  Pertinent negatives:   No bowel or bladder incontinence.  No urinary retention.  No fevers, unintentional weight loss, balance changes, headaches, frequent falling, difficulty swallowing, or coordination difficulties.  All others reviewed are negative.           Past Medical History:   Diagnosis Date     Alcoholic cirrhosis of liver with ascites (H) 10/11/2019     ANCA-associated vasculitis (H) 2022     Antiplatelet or antithrombotic long-term use      Avascular necrosis (H)     Left femoral head     C. difficile colitis      C. difficile diarrhea      Coronary artery disease     [facility] 4/2023 - complete occlusion of RCA     ESRD (end stage renal disease) on dialysis (H)      Gout      HCC (hepatocellular carcinoma) (H) 09/05/2023     History of hemochromatosis 10/11/2019     Hypertension      Hypothyroidism 12/19/2023     IgA nephropathy      Obesity      PAF (paroxysmal atrial fibrillation) (H)      Pre-diabetes 2023     Psoriatic arthritis (H)      RA (rheumatoid arthritis) (H)      Status post kidney transplant 06/06/2023    Induction with thymoglobulin  4mg/kg, + DSA CW9     Status post liver transplantation (H) 06/06/2023       The following portions of the patient's history were reviewed and updated as appropriate: allergies, current medications, past family history, past medical history, past social history, past surgical history and problem list.           Objective:   Physical Exam:    /70   Pulse 67   SpO2 95%   There is no height or weight on file to calculate BMI.      General: Alert and oriented with normal affect. Attention, knowledge, memory, and language are intact. No acute distress.   Eyes: Sclerae are clear.  Respirations: Unlabored. CV: 1+ lower extremity edema bilaterally skin: No rashes seen.    Gait:  Nonantalgic  Positive facet loading bilaterally with low back pain tenderness over the lumbosacral junction bilaterally hypertonic tissue textures over L5-S1 facets.  Tenderness over the SI joints bilaterally as well.  Sensation is intact to light touch throughout the  lower extremities.  Reflexes are 1+ patellar and 0 Achilles   Minimally reduced hip internal/external rotation from seated no reproduction of pain.    Manual muscle testing reveals:  Right /Left out of 5        5/5 hip flexors  5/5 knee flexors  5/5 knee extensors  5/5 ankle plantar flexors  5/5 ankle dorsiflexors  5/5  EHL       Again, thank you for allowing me to participate in the care of your patient.        Sincerely,        Abdulaziz Mejia, DO

## 2024-06-17 NOTE — PATIENT INSTRUCTIONS
A Lumbar medial branch block has been ordered today. Please schedule this injection at least  2 weeks from now to allow time for insurance prior authorization. On the day of your injection, you cannot be sick or taking antibiotics. If you become sick and are prescribed, please call the clinic so your injection can be rescheduled for once you have completed your antibiotics. You will need to bring a  with you for your injection. If you have any questions or concerns prior to your injection, please do not hesitate to call the nurse navigation line at [phone].   Continue with physical therapy  Medial Branch Block (MBB) TEST    This is a TEST injection to find out what is causing your pain. Specifically, this test is trying to identify which joint in your back is causing pain.  This injection will NOT provide any long term pain relief because it is just a TEST. Steroid is NOT used for this injection. Steroid is what gives long  term pain relief. Since there is no steroid used, there is no long term pain relief.    Because we want to determine what is causing your pain, we need to do a few things on the day of your Medial Branch Block TEST:  Do not take any pain medications on the day of your procedure.  Try to do activities that make your pain increase. We want you to have a high level of pain on the day of the test. This makes it easier to know the results of the test.    Other information:   - you may eat and drink on the day of the test.   - you should take your other medications (just not pain medications) at the times you usually take them.   - you will be asked to fill out a pain diary for 6 hours after the test.    AFTER THE TEST:   1. Please do not take any pain medications for 6 hours after the procedure. After the pain diary is filled out, you may resume taking your pain medications.   2. Please return the pain diary to the [facility] as soon as possible or as you are able.   3. You will be  contacted by a care team member with your plan, after the physician reviews your pain diary.    a. You may be asked to return for a follow-up visit to discuss other treatment options.    b. It may be recommended that you have an injection to help treat your pain.    c. You may have to come in for a second set of Blocks.    Please contact the [facility] at [phone], if you have any questions.

## 2024-06-17 NOTE — PROGRESS NOTES
Assessment/Plan:      Casey was seen today for follow up.    Diagnoses and all orders for this visit:    Lumbar facet arthropathy  -     PAIN Medial Branch Block Lumbar One Level Bilateral; Future    Lumbar radicular pain    Foraminal stenosis of lumbar region    Myofascial pain    Stenosis of lateral recess of lumbar spine    DISH (diffuse idiopathic skeletal hyperostosis)         Assessment: Pleasant 68 year old male with history of coronary artery disease, hypertension, hypothyroid, Hepatocellular carcinoma status post liver and kidney transplant 1 year ago with:     1.  Persistent chronic lumbar spine pain at the lumbosacral junction worse first thing in the morning better throughout the day and worse at night consistent with facet arthropathy.  Severe facet arthropathy L5-S1 bilaterally.  Positive facet loading on exam today and now only having axial low back pain with some left gluteal pain intermittently.  Gluteal pain likely referred from the facets.  Pain had improved with physical therapy and now worsened again.    2.  Previous left lower extremity radicular pain.  He has multilevel degenerative disc disease through the lumbar spine with severe left foraminal stenosis at L5-S1 and lateral recess stenosis with facet arthropathy and facet joint edema.  EMG findings positive for left S1 radiculopathy I also question if the L5 nerve is involved given the MRI findings.  Radicular pain has dramatically improved/resolved following physical therapy and doing home exercises.     3.  Myofascial pain lumbar spine.       4.diffuse idiopathic skeletal hyperostosis with ankylosis of the L4-5 level as well as much of the upper lumbar spine and lower thoracic spine.      5.  Paresthesias in the feet consistent with peripheral polyneuropathy.  On gabapentin  Per PCP.      Discussion:    1.  I discussed the diagnosis and treatment options.  At this time his pain is most consistent with facet arthropathy in the lumbar spine  given the ankylosis of L4-5 the L5-S1 facet joints with edema is likely the biggest contributor to his pain.  His radicular pain has improved with physical therapy but will monitor.  He will continue to monitor for weakness.    2.  Recommend bilaterally L4, 5 medial branch blocks.    3.  Continue PT home exercises.    4.  No new medications at this time.  He is not interested in any medications.    6.  Can consider lumbar epidural steroid injection if he has persistent gluteal pain which could represent proximal radicular pain but likely represents facet mediated referral.    7.  Follow-up at his earliest convenience for injection.    It was our pleasure caring for your patient today, if there any questions or concerns please do not hesitate to contact us.    Over 30 minutes were spent on the date of the encounter performing chart review, patient visit and documentation in addition to any procedure.    Subjective:   Patient ID: Damion Quinones is a 68 year old male.    History of Present Illness: Patient presents with his wife today for follow-up of lumbar spine pain left gluteal pain as well as foot paresthesias.  Is previously having left lower extremity pain into the thigh as well which is proximal radicular in nature.  I done physical therapy and his pain improved during therapy with the low back as well as left leg which continues to be improved but having worsening low back pain now that stopped therapy doing home PT exercises.  Pain is worse first thing in the morning gets up and get moving its better and worse at the end of the day again.  After sitting he also has significant pain and prolonged walking.  Pain is a 3/10 at worst best and today.  Better with doing PT exercises.  Tylenol also as needed.  Taking gabapentin per PCP.  He has numbness and tingling in the feet which is not new.      Imaging: I reviewed the MRI lumbar spine images from February 2024.  This reveals significant degenerative disc  disease L4-5 L2-3 L1-2 and moderate L3-4.  No high-grade central stenosis.  There is significant facet arthropathy severe L5-S1 left greater than right with synovial cyst on the left resulting in left lateral recess stenosis as well as severe left foraminal stenosis.  Ankylosis L4-5 level.    CT scan abdomen pelvis personally reviewed from December 2023 for medical decision-making purposes.  Ankylosis anterior osteophyte L4-5.  Severe facet arthropathy L5-S1 bilaterally.  DISH throughout the lower thoracic upper lumbar spine.  Hips mild osteoarthritis changes.    Review of Systems: Pertinent positives: Paresthesias weakness.  Pertinent negatives:   No bowel or bladder incontinence.  No urinary retention.  No fevers, unintentional weight loss, balance changes, headaches, frequent falling, difficulty swallowing, or coordination difficulties.  All others reviewed are negative.           Past Medical History:   Diagnosis Date    Alcoholic cirrhosis of liver with ascites (H) 10/11/2019    ANCA-associated vasculitis (H) 2022    Antiplatelet or antithrombotic long-term use     Avascular necrosis (H)     Left femoral head    C. difficile colitis     C. difficile diarrhea     Coronary artery disease     [facility] 4/2023 - complete occlusion of RCA    ESRD (end stage renal disease) on dialysis (H)     Gout     HCC (hepatocellular carcinoma) (H) 09/05/2023    History of hemochromatosis 10/11/2019    Hypertension     Hypothyroidism 12/19/2023    IgA nephropathy     Obesity     PAF (paroxysmal atrial fibrillation) (H)     Pre-diabetes 2023    Psoriatic arthritis (H)     RA (rheumatoid arthritis) (H)     Status post kidney transplant 06/06/2023    Induction with thymoglobulin 4mg/kg, + DSA CW9    Status post liver transplantation (H) 06/06/2023       The following portions of the patient's history were reviewed and updated as appropriate: allergies, current medications, past family history, past medical history, past social history,  past surgical history and problem list.           Objective:   Physical Exam:    /70   Pulse 67   SpO2 95%   There is no height or weight on file to calculate BMI.      General: Alert and oriented with normal affect. Attention, knowledge, memory, and language are intact. No acute distress.   Eyes: Sclerae are clear.  Respirations: Unlabored. CV: 1+ lower extremity edema bilaterally skin: No rashes seen.    Gait:  Nonantalgic  Positive facet loading bilaterally with low back pain tenderness over the lumbosacral junction bilaterally hypertonic tissue textures over L5-S1 facets.  Tenderness over the SI joints bilaterally as well.  Sensation is intact to light touch throughout the  lower extremities.  Reflexes are 1+ patellar and 0 Achilles   Minimally reduced hip internal/external rotation from seated no reproduction of pain.    Manual muscle testing reveals:  Right /Left out of 5        5/5 hip flexors  5/5 knee flexors  5/5 knee extensors  5/5 ankle plantar flexors  5/5 ankle dorsiflexors  5/5  EHL

## 2024-06-18 ENCOUNTER — VIRTUAL VISIT (OUTPATIENT)
Dept: ENDOCRINOLOGY | Facility: CLINIC | Age: 69
End: 2024-06-18
Attending: INTERNAL MEDICINE
Payer: COMMERCIAL

## 2024-06-18 ENCOUNTER — PRE VISIT (OUTPATIENT)
Dept: ENDOCRINOLOGY | Facility: CLINIC | Age: 69
End: 2024-06-18

## 2024-06-18 DIAGNOSIS — M87.00 AVASCULAR NECROSIS OF BONE (H): ICD-10-CM

## 2024-06-18 DIAGNOSIS — E03.9 HYPOTHYROIDISM, UNSPECIFIED TYPE: ICD-10-CM

## 2024-06-18 PROCEDURE — 99205 OFFICE O/P NEW HI 60 MIN: CPT | Mod: 95 | Performed by: STUDENT IN AN ORGANIZED HEALTH CARE EDUCATION/TRAINING PROGRAM

## 2024-06-18 ASSESSMENT — PAIN SCALES - GENERAL: PAINLEVEL: NO PAIN (0)

## 2024-06-18 NOTE — LETTER
6/18/2024       RE: Damion Quinones   [address] [facility] [address]     Dear Colleague,    Thank you for referring your patient, Damion Quinones, to the [facility] ENDOCRINOLOG[facility] [place] at [facility]. Please see a copy of my visit note below.    Endocrinology Clinic Visit 6/18/2024      Video-Visit Details    Type of service:  Video Visit    Video Start Time (time video started): 10:11 am    Video End Time (time video stopped): 10:51 am    Originating Location (pt. Location): Home        Distant Location (provider location):  Off-site    Mode of Communication:  [facility]    Physician has received verbal consent for a Video Visit from the patient? Yes    I spent a total of 67 minutes on the date of encounter reviewing medical records, evaluating the patient, coordinating care and documenting in the EHR, as detailed above.      NAME:  Damion Quinones  PCP:  Gary Serrano  MRN:  [id number]  Reason for Consult:  hypothyroidism and avascular necrosis of the hip  Requesting Provider:  Lolis Adrian*    Chief Complaint     Chief Complaint   Patient presents with    Consult       History of Present Illness     Damion Quinones is a 68 year old male who is seen in video visit for hypothyroidism and avascular necrosis of left femur. He was on video with his wife.    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       # Hypothyroidism   Latest Ref Rng 11/22/2022  8:48 AM 12/16/2022  4:52 PM 4/7/2023  11:50 AM 9/5/2023  7:02 AM 9/20/2023  4:11 PM   ENDO THYROID LABS-[facility]         TSH 0.30 - 4.20 uIU/mL 4.91 (H)  2.86  3.37  7.03 (H)  0.73    FREE T4 0.90 - 1.70 ng/dL 1.09    0.76 (L)     Thyroglobulin Antibody <40 IU/mL     <20    Thyroid Peroxidase Antibody <35 IU/mL     <10       Latest Ref  Rng 12/28/2023  1:38 PM   END[facility] THYROID [facility]     TSH 0.30 - 4.20 uIU/mL 2.93    FREE T4 0.90 - 1.70 ng/dL    Thyroglobulin Antibody <40 IU/mL    Thyroid Peroxidase Antibody <35 IU/mL          He was started on lt4 at 88 mcg daily since 9/2023  He denied any FH of hypothyroidism.  Currently takes levothyroxine on empty stomach and waits at least 30 minutes before eating.  Does not take calcium or iron containing multivitamins within 4 hours of taking the levothyroxine tablet.      # avascular necrosis of left femur  He was hospitalized 12/2023 for repair of umbilical hernia. During his hospitalization Ct A/P noted 12/2023  Left femoral head and avascular necrosis. No evidence of collapse. Slight stable sclerosis of the right femoral head may also represent minimally avascular necrosis change.  He follows regular with PMR, he was told he has no issues with his hip. He denied any hip pain. He walks without assistance.    The patient had a DXA scan on 11/2018 and 12/2022 which showed:   The most negative and valid T-score of -1.3 at the level of the left femoral neck corresponds with low bone density according to WHO criteria for postmenopausal females and men age 50 and over.      Results   Lumbar spine L1-L3  T-score 5.3 , BMD 1.846 g/cm2.      Left Femur neck  T-score -1.3 , BMD 0.902 g/cm2.  Left Total hip  T-score 0.0 , BMD 1.099 g/cm2.     Right Femur neck  T-score -0.5, BMD  1.010 g/cm2.  Right Total hip  T-score -0.3 , BMD  1.061 g/cm2.     Interval change  No prior study available for comparison.     Fracture risk   The estimated 10-year risk for a major osteoporotic fracture is 11.8% and for a hip fracture is 1.7%.    Calcium intake:   Dietary: 1 glass of milk every day, cheese 3 times a day, yogurt couple times a week, ice cream twice a week.  Supplements: multivitamin 210 mg daily.    Vitamin D intake:   Supplements: no, multivitamin  Last vitamin D level was 43 on 1/2024.    Osteoporosis Risk factors:    Previous fractures: no  Family history of fragility fracture in parent: no  Current smoking: no  Steroid Use: high doses prior to his transplant for 1 year, currently on 5 mg prednisone daily for life.  Rheumatoid  arthritis: yes  Alcohol (3 or more units per day): remote hx of heavy alcohol intake for years, sober since 2015  Reported loss of height: maybe shorter but not significant.  Tendency for falls: no  GI history: Malabsorption (IBD, Celiac, gastric bypass ): no  History of kidney stones: no  History of thyroid disease: yes as below  Physical activity: biking 12 miles 3-4 times a week.   Weight history: overall stable.  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)    HTN, kidney transplant related    Hereditary hemochromatosis (H24)    Dyslipidemia    Tophaceous gout    Deep vein thrombosis (DVT) of non-extremity vein, unspecified chronicity    CKD (chronic kidney disease) stage 2, GFR 60-89 ml/min    Paroxysmal atrial fibrillation (H)    Liver replaced by transplant (H)    Immunosuppressed status (H24)    Kidney replaced by transplant    CAD (coronary artery disease)    Aftercare following organ transplant    Peripheral neuropathy    Anemia in stage 2 chronic kidney disease    Hypothyroidism    Hypomagnesemia    GERD (gastroesophageal reflux disease)    Avascular necrosis of bone (H)    Alcohol dependence, in remission (H)    Lumbar radicular pain    Foraminal stenosis of lumbar region    Stenosis of lateral recess of lumbar spine    Lumbar facet arthropathy    Myofascial pain        Medications     Current Outpatient Medications   Medication Sig Dispense Refill    Alcohol Swabs PADS Use to swab the area of the injection or quyen as directed Per insurance coverage 100 each 0    allopurinol (ZYLOPRIM)  100 MG tablet Take 100 mg by mouth daily      allopurinol (ZYLOPRIM) 300 MG tablet Take 300 mg by mouth daily      anakinra (KINERET) 100 MG/0.67ML SOSY injection Inject 0.67 mLs (100 mg) Subcutaneous daily as needed      apixaban ANTICOAGULANT (ELIQUIS ANTICOAGULANT) 5 MG tablet Take 1 tablet (5 mg) by mouth 2 times daily 180 tablet 3    atorvastatin (LIPITOR) 40 MG tablet Take 1 tablet (40 mg) by mouth every evening 90 tablet 3    Blood Glucose Monitoring Suppl (ONETOUCH VERIO REFLECT) w/Device KIT       capsaicin (ZOSTRIX) 0.025 % external cream Apply to feet two times per day 60 g 1    clotrimazole (LOTRIMIN) 1 % external cream Apply topically 2 times daily For feet 60 g 11    furosemide (LASIX) 20 MG tablet TAKE 1 TABLET(20 MG) BY MOUTH DAILY 90 tablet 3    gabapentin (NEURONTIN) 100 MG capsule Take 1 capsule (100 mg) by mouth every morning AND 1 capsule (100 mg) daily (with lunch) AND 2 capsules (200 mg) every evening. 100 mg in am, 100 mg in the afternoon and 200 mg at bedtime by mouth daily 360 capsule 3    levothyroxine (SYNTHROID/LEVOTHROID) 88 MCG tablet Take 1 tablet (88 mcg) by mouth daily Takes in the middle of the night daily 90 tablet 3    magnesium oxide (MAG-OX) 400 MG tablet Take 2 tablets (800 mg) by mouth 2 times daily 60 tablet 0    multivitamin w/minerals (THERA-VIT-M) tablet Take 1 tablet by mouth daily 30 tablet 0    mupirocin (BACTROBAN) 2 % external ointment Apply topically 2 times daily 60 g 3    MYFORTIC (BRAND) 180 MG EC tablet Take 3 tablets (540 mg) by mouth 2 times daily 540 tablet 3    pantoprazole (PROTONIX) 20 MG EC tablet Take 1 tablet (20 mg) by mouth every morning (before breakfast) 90 tablet 3    predniSONE (DELTASONE) 5 MG tablet Take 1 tablet (5 mg) by mouth daily 90 tablet 3    Sharps Container MISC Use as directed to dispose of needles, lancets and other sharps 1 each 0    simethicone (MYLICON) 125 MG chewable tablet Take 125 mg by mouth 4 times daily as needed for  intestinal gas      sulfamethoxazole-trimethoprim (BACTRIM) 400-80 MG tablet Take 1 tablet by mouth daily 90 tablet 3    tacrolimus (GENERIC EQUIVALENT) 0.5 MG capsule Take 1 capsule (0.5 mg) by mouth 2 times daily Total dose 1.5 mg  capsule 3    tacrolimus (GENERIC EQUIVALENT) 1 MG capsule Take 1 capsule (1 mg) by mouth 2 times daily Total dose: 1mg  capsule 3    tadalafil (CIALIS) 5 MG tablet Take 1 tablet (5 mg) by mouth daily as needed (take 1-2 tablets at least 30 minutes prior to sexual activity.) 20 tablet 5    triamcinolone (KENALOG) 0.1 % external ointment Apply topically 2 times daily 454 g 3    ursodiol (ACTIGALL) 250 MG tablet Take 1 tablet (250 mg) by mouth 2 times daily 180 tablet 3    metoprolol tartrate (LOPRESSOR) 50 MG tablet Take 1 tablet (50 mg) by mouth 2 times daily for 90 days 180 tablet 3     No current facility-administered medications for this visit.        Allergies     No Known Allergies    Medical / Surgical History     Past Medical History:   Diagnosis Date    Alcoholic cirrhosis of liver with ascites (H) 10/11/2019    ANCA-associated vasculitis (H) 2022    Antiplatelet or antithrombotic long-term use     Avascular necrosis (H)     Left femoral head    C. difficile colitis     C. difficile diarrhea     Coronary artery disease     [facility] 4/2023 - complete occlusion of RCA    ESRD (end stage renal disease) on dialysis (H)     Gout     HCC (hepatocellular carcinoma) (H) 09/05/2023    History of hemochromatosis 10/11/2019    Hypertension     Hypothyroidism 12/19/2023    IgA nephropathy     Obesity     PAF (paroxysmal atrial fibrillation) (H)     Pre-diabetes 2023    Psoriatic arthritis (H)     RA (rheumatoid arthritis) (H)     Status post kidney transplant 06/06/2023    Induction with thymoglobulin 4mg/kg, + DSA CW9    Status post liver transplantation (H) 06/06/2023     Past Surgical History:   Procedure Laterality Date    APPENDECTOMY      Removed at 18 Years Old     BENCH  KIDNEY  2023    Procedure: Bench kidney;  Surgeon: Chad Clifton MD;  Location: [facility] OR    BENCH LIVER  2023    Procedure: Bench liver;  Surgeon: Chad Clifton MD;  Location: [facility] OR    COLONOSCOPY      2014 at [facility]     CV CORONARY ANGIOGRAM N/A 2023    Procedure: Coronary Angiogram;  Surgeon: Pablo Araujo MD;  Location:  HE[facility] CARDIAC CATH LAB    EXPLORE GROIN N/A 12/10/2023    Procedure: Umbilical wound exploration, incision and drainage of abcess, exploratory laparotomy, mesh excision, small bowel primary repair;  Surgeon: Chad Clifton MD;  Location: [facility] OR    EYE SURGERY Bilateral     Cataract    H STATISTIC PICC LINE INSERTION >5YR, FAILED Left 2023    Unable to advance catheter over the axillary area    H STATISTIC PICC LINE INSERTION >5YR, FAILED      HERNIA REPAIR      History of bilateral inguinal hernia repair: 10/28/2014. Open hernia repair: 10/2017. Abdominal wound exploration and debridement 2017    HERNIORRHAPHY UMBILICAL N/A 2023    Procedure: HERNIORRHAPHY, UMBILICAL, OPEN, WITH MESH;  Surgeon: Chad Clifton MD;  Location: [facility] OR    INSERT SHUNT PORTAL TRANSJUGULAR INTRAHEPTIC  2022    IR CVC NON TUNNEL LINE REMOVAL  2023    IR CVC NON TUNNEL PLACEMENT > 5 YRS  2023    IR CVC TUNNEL PLACEMENT > 5 YRS OF AGE  2023    IR CVC TUNNEL PLACEMENT > 5 YRS OF AGE  2023    IR PICC PLACEMENT > 5 YRS OF AGE  2023    IR RENAL BIOPSY RIGHT  2023    picc failed attempt Right 2023    PICC failed attempt Right arm unable to thread the catheter in.    RETURN LIVER TRANSPLANT N/A 2023    Procedure: Return liver transplant. Intra-operative ultrasound;  Surgeon: Chad Clifton MD;  Location: [facility] OR    TRANSPLANT KIDNEY RECIPIENT  DONOR N/A 2023    Procedure: Transplant kidney recipient  donor, ureteral stent placement;  Surgeon: Etienne  MD Chad;  Location: [facility] OR    TRANSPLANT LIVER RECIPIENT  DONOR N/A 2023    Procedure: Transplant liver recipient  donor;  Surgeon: Chad Clifton MD;  Location:  OR       Social History     Social History     Socioeconomic History    Marital status:      Spouse name: Not on file    Number of children: Not on file    Years of education: Not on file    Highest education level: Not on file   Occupational History    Not on file   Tobacco Use    Smoking status: Never     Passive exposure: Never    Smokeless tobacco: Never   Vaping Use    Vaping status: Never Used   Substance and Sexual Activity    Alcohol use: Not Currently     Comment: Last ETOH use was 2021    Drug use: Not Currently    Sexual activity: Yes   Other Topics Concern    Parent/sibling w/ CABG, MI or angioplasty before 65F 55M? Not Asked   Social History Narrative    Not on file     Social Determinants of Health     Financial Resource Strain: Not on file   Food Insecurity: Not on file   Transportation Needs: Not on file   Physical Activity: Sufficiently Active (3/27/2024)    Exercise Vital Sign     Days of Exercise per Week: 5 days     Minutes of Exercise per Session: 50 min   Stress: No Stress Concern Present (3/27/2024)    [employer] - Occupational Stress Questionnaire     Feeling of Stress : Not at all   Social Connections: Unknown (3/27/2024)    Social Connection and Isolation Panel [NHANES]     Frequency of Communication with Friends and Family: Not on file     Frequency of Social Gatherings with Friends and Family: Twice a week     Attends Holiness Services: Not on file     Active Member of Clubs or Organizations: Not on file     Attends Club or Organization Meetings: Not on file     Marital Status: Not on file   Interpersonal Safety: Not on file   Housing Stability: Not on file       Family History     Family History   Problem Relation Age of Onset    Lung Cancer Mother   "   Colon Cancer Father     Lung Cancer Brother     Heart Failure Brother     Kidney Disease Brother        ROS     12 ROS completed, pertinent positive and negative in HPI    Physical Exam   There were no vitals taken for this visit.   GENERAL: alert and no distress  EYES: Eyes grossly normal to inspection.  No discharge or erythema, or obvious scleral/conjunctival abnormalities.  RESP: No audible wheeze, cough, or visible cyanosis.    SKIN: Visible skin clear. No significant rash, abnormal pigmentation or lesions.  NEURO: Cranial nerves grossly intact.  Mentation and speech appropriate for age.  PSYCH: Appropriate affect, tone, and pace of words     Labs/Imaging     Pertinent Labs were reviewed and updated in EPIC and discussed briefly.  Radiology Results were  reviewed and updated in EPIC and discussed briefly.    Summary of recent findings:   Lab Results   Component Value Date    A1C 6.4 12/28/2023    A1C 5.2 09/20/2023    A1C 5.1 06/06/2023    A1C 4.7 05/19/2023    A1C 5.3 11/22/2022       TSH   Date Value Ref Range Status   12/28/2023 2.93 0.30 - 4.20 uIU/mL Final   09/20/2023 0.73 0.30 - 4.20 uIU/mL Final   09/05/2023 7.03 (H) 0.30 - 4.20 uIU/mL Final   04/07/2023 3.37 0.30 - 4.20 uIU/mL Final   12/16/2022 2.86 0.30 - 4.20 uIU/mL Final     Free T4   Date Value Ref Range Status   09/05/2023 0.76 (L) 0.90 - 1.70 ng/dL Final   11/22/2022 1.09 0.90 - 1.70 ng/dL Final       Creatinine   Date Value Ref Range Status   05/30/2024 0.95 0.67 - 1.17 mg/dL Final   08/27/2019 1.11 0.73 - 1.18 mg/dL Final       Recent Labs   Lab Test 04/29/24  0812 12/28/23  1338   CHOL 119 159   HDL 39* 29*   LDL 52 83   TRIG 140 234*       No results found for: \"[id number]\", \"[id number]\", \"[id number]\"    I personally reviewed the patient's outside records from [facility] EMR and Care Everywhere. Summary of pertinent findings in HPI.    Impression / Plan     1. Hypothyroidism  Noted on labs 9/2023, on lt4 with appropriate replacement. Most " recent TSH 2.93. he had some improvement in his fatigue. We reviewed his diagnosis today and the need for lt4 for life. Patient and wife do not believe that challenge him by stopping and retesting his TFT is worth it, which is reasonable.  Checking TFT now then yearly by PCP.    2. Avascular necrosis of the femur ( left), asymptomatic , without femoral collapse.  Likely secondary to previous high dose steroids, post transplant and remote alcoholism. Not related to problem 3. PCP may consider orthopedic referral for ongoing care and follow-up.    3. Osteopenia, mild  Frax adjusted to steroids is not elevated, no indication for OP medication use. Continue adequate ca and vitd. Fall precaution. Follow-up dxa every 2 years.    Test and/or medications prescribed today:  No orders of the defined types were placed in this encounter.        Follow up: CESAR Baker MD  Endocrinology, Diabetes and Metabolism  [facility]

## 2024-06-18 NOTE — NURSING NOTE
Is the patient currently in the state of [place]? YES    Visit mode:VIDEO    If the visit is dropped, the patient can be reconnected by: VIDEO VISIT: Text to cell phone:   Telephone Information:   Mobile [phone]       Will anyone else be joining the visit? NO  (If patient encounters technical issues they should call [phone]    How would you like to obtain your AVS? MyChart    Are changes needed to the allergy or medication list? No    Are refills needed on medications prescribed by this physician? NO    Reason for visit: Consult    Shelby Kocher VVF

## 2024-06-18 NOTE — PROGRESS NOTES
Endocrinology Clinic Visit 6/18/2024      Video-Visit Details    Type of service:  Video Visit    Video Start Time (time video started): 10:11 am    Video End Time (time video stopped): 10:51 am    Originating Location (pt. Location): Home        Distant Location (provider location):  Off-site    Mode of Communication:  Video Conference via doximity    Physician has received verbal consent for a Video Visit from the patient? Yes    I spent a total of 67 minutes on the date of encounter reviewing medical records, evaluating the patient, coordinating care and documenting in the EHR, as detailed above.      NAME:  Damion Quinones  PCP:  Gary Serrano  MRN:  [id number]  Reason for Consult:  hypothyroidism and avascular necrosis of the hip  Requesting Provider:  Lolis Adrian*    Chief Complaint     Chief Complaint   Patient presents with    Consult       History of Present Illness     Damion Quinones is a 68 year old male who is seen in video visit for hypothyroidism and avascular necrosis of left femur. He was on video with his wife.    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       # Hypothyroidism   Latest Ref Rng 11/22/2022  8:48 AM 12/16/2022  4:52 PM 4/7/2023  11:50 AM 9/5/2023  7:02 AM 9/20/2023  4:11 PM   [facility]ID [facility]         TSH 0.30 - 4.20 uIU/mL 4.91 (H)  2.86  3.37  7.03 (H)  0.73    FREE T4 0.90 - 1.70 ng/dL 1.09    0.76 (L)     Thyroglobulin Antibody <40 IU/mL     <20    Thyroid Peroxidase Antibody <35 IU/mL     <10       Latest Ref Rng 12/28/2023  1:38 PM   [facility]     TSH 0.30 - 4.20 uIU/mL 2.93    FREE T4 0.90 - 1.70 ng/dL    Thyroglobulin Antibody <40 IU/mL    Thyroid Peroxidase Antibody <35 IU/mL          He was started on lt4 at 88 mcg daily since 9/2023  He denied any FH of hypothyroidism.  Currently takes levothyroxine  on empty stomach and waits at least 30 minutes before eating.  Does not take calcium or iron containing multivitamins within 4 hours of taking the levothyroxine tablet.      # avascular necrosis of left femur  He was hospitalized 12/2023 for repair of umbilical hernia. During his hospitalization Ct A/P noted 12/2023  Left femoral head and avascular necrosis. No evidence of collapse. Slight stable sclerosis of the right femoral head may also represent minimally avascular necrosis change.  He follows regular with PMR, he was told he has no issues with his hip. He denied any hip pain. He walks without assistance.    The patient had a DXA scan on 11/2018 and 12/2022 which showed:   The most negative and valid T-score of -1.3 at the level of the left femoral neck corresponds with low bone density according to WHO criteria for postmenopausal females and men age 50 and over.      Results   Lumbar spine L1-L3  T-score 5.3 , BMD 1.846 g/cm2.      Left Femur neck  T-score -1.3 , BMD 0.902 g/cm2.  Left Total hip  T-score 0.0 , BMD 1.099 g/cm2.     Right Femur neck  T-score -0.5, BMD  1.010 g/cm2.  Right Total hip  T-score -0.3 , BMD  1.061 g/cm2.     Interval change  No prior study available for comparison.     Fracture risk   The estimated 10-year risk for a major osteoporotic fracture is 11.8% and for a hip fracture is 1.7%.    Calcium intake:   Dietary: 1 glass of milk every day, cheese 3 times a day, yogurt couple times a week, ice cream twice a week.  Supplements: multivitamin 210 mg daily.    Vitamin D intake:   Supplements: no, multivitamin  Last vitamin D level was 43 on 1/2024.    Osteoporosis Risk factors:   Previous fractures: no  Family history of fragility fracture in parent: no  Current smoking: no  Steroid Use: high doses prior to his transplant for 1 year, currently on 5 mg prednisone daily for life.  Rheumatoid  arthritis: yes  Alcohol (3 or more units per day): remote hx of heavy alcohol intake for years,  sober since 2015  Reported loss of height: maybe shorter but not significant.  Tendency for falls: no  GI history: Malabsorption (IBD, Celiac, gastric bypass ): no  History of kidney stones: no  History of thyroid disease: yes as below  Physical activity: biking 12 miles 3-4 times a week.   Weight history: overall stable.  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)    HTN, kidney transplant related    Hereditary hemochromatosis (H24)    Dyslipidemia    Tophaceous gout    Deep vein thrombosis (DVT) of non-extremity vein, unspecified chronicity    CKD (chronic kidney disease) stage 2, GFR 60-89 ml/min    Paroxysmal atrial fibrillation (H)    Liver replaced by transplant (H)    Immunosuppressed status (H24)    Kidney replaced by transplant    CAD (coronary artery disease)    Aftercare following organ transplant    Peripheral neuropathy    Anemia in stage 2 chronic kidney disease    Hypothyroidism    Hypomagnesemia    GERD (gastroesophageal reflux disease)    Avascular necrosis of bone (H)    Alcohol dependence, in remission (H)    Lumbar radicular pain    Foraminal stenosis of lumbar region    Stenosis of lateral recess of lumbar spine    Lumbar facet arthropathy    Myofascial pain        Medications     Current Outpatient Medications   Medication Sig Dispense Refill    Alcohol Swabs PADS Use to swab the area of the injection or quyen as directed Per insurance coverage 100 each 0    allopurinol (ZYLOPRIM) 100 MG tablet Take 100 mg by mouth daily      allopurinol (ZYLOPRIM) 300 MG tablet Take 300 mg by mouth daily      anakinra (KINERET) 100 MG/0.67ML SOSY injection Inject 0.67 mLs (100 mg) Subcutaneous daily as needed      apixaban ANTICOAGULANT (ELIQUIS ANTICOAGULANT) 5 MG tablet Take 1 tablet (5 mg) by mouth 2  times daily 180 tablet 3    atorvastatin (LIPITOR) 40 MG tablet Take 1 tablet (40 mg) by mouth every evening 90 tablet 3    Blood Glucose Monitoring Suppl (ONETOUCH VERIO REFLECT) w/Device KIT       capsaicin (ZOSTRIX) 0.025 % external cream Apply to feet two times per day 60 g 1    clotrimazole (LOTRIMIN) 1 % external cream Apply topically 2 times daily For feet 60 g 11    furosemide (LASIX) 20 MG tablet TAKE 1 TABLET(20 MG) BY MOUTH DAILY 90 tablet 3    gabapentin (NEURONTIN) 100 MG capsule Take 1 capsule (100 mg) by mouth every morning AND 1 capsule (100 mg) daily (with lunch) AND 2 capsules (200 mg) every evening. 100 mg in am, 100 mg in the afternoon and 200 mg at bedtime by mouth daily 360 capsule 3    levothyroxine (SYNTHROID/LEVOTHROID) 88 MCG tablet Take 1 tablet (88 mcg) by mouth daily Takes in the middle of the night daily 90 tablet 3    magnesium oxide (MAG-OX) 400 MG tablet Take 2 tablets (800 mg) by mouth 2 times daily 60 tablet 0    multivitamin w/minerals (THERA-VIT-M) tablet Take 1 tablet by mouth daily 30 tablet 0    mupirocin (BACTROBAN) 2 % external ointment Apply topically 2 times daily 60 g 3    MYFORTIC (BRAND) 180 MG EC tablet Take 3 tablets (540 mg) by mouth 2 times daily 540 tablet 3    pantoprazole (PROTONIX) 20 MG EC tablet Take 1 tablet (20 mg) by mouth every morning (before breakfast) 90 tablet 3    predniSONE (DELTASONE) 5 MG tablet Take 1 tablet (5 mg) by mouth daily 90 tablet 3    Sharps Container MISC Use as directed to dispose of needles, lancets and other sharps 1 each 0    simethicone (MYLICON) 125 MG chewable tablet Take 125 mg by mouth 4 times daily as needed for intestinal gas      sulfamethoxazole-trimethoprim (BACTRIM) 400-80 MG tablet Take 1 tablet by mouth daily 90 tablet 3    tacrolimus (GENERIC EQUIVALENT) 0.5 MG capsule Take 1 capsule (0.5 mg) by mouth 2 times daily Total dose 1.5 mg  capsule 3    tacrolimus (GENERIC EQUIVALENT) 1 MG capsule Take 1 capsule (1 mg)  by mouth 2 times daily Total dose: 1mg  capsule 3    tadalafil (CIALIS) 5 MG tablet Take 1 tablet (5 mg) by mouth daily as needed (take 1-2 tablets at least 30 minutes prior to sexual activity.) 20 tablet 5    triamcinolone (KENALOG) 0.1 % external ointment Apply topically 2 times daily 454 g 3    ursodiol (ACTIGALL) 250 MG tablet Take 1 tablet (250 mg) by mouth 2 times daily 180 tablet 3    metoprolol tartrate (LOPRESSOR) 50 MG tablet Take 1 tablet (50 mg) by mouth 2 times daily for 90 days 180 tablet 3     No current facility-administered medications for this visit.        Allergies     No Known Allergies    Medical / Surgical History     Past Medical History:   Diagnosis Date    Alcoholic cirrhosis of liver with ascites (H) 10/11/2019    ANCA-associated vasculitis (H) 2022    Antiplatelet or antithrombotic long-term use     Avascular necrosis (H)     Left femoral head    C. difficile colitis     C. difficile diarrhea     Coronary artery disease     [facility] 4/2023 - complete occlusion of RCA    ESRD (end stage renal disease) on dialysis (H)     Gout     HCC (hepatocellular carcinoma) (H) 09/05/2023    History of hemochromatosis 10/11/2019    Hypertension     Hypothyroidism 12/19/2023    IgA nephropathy     Obesity     PAF (paroxysmal atrial fibrillation) (H)     Pre-diabetes 2023    Psoriatic arthritis (H)     RA (rheumatoid arthritis) (H)     Status post kidney transplant 06/06/2023    Induction with thymoglobulin 4mg/kg, + DSA CW9    Status post liver transplantation (H) 06/06/2023     Past Surgical History:   Procedure Laterality Date    APPENDECTOMY      Removed at 18 Years Old     BENCH KIDNEY  06/06/2023    Procedure: Bench kidney;  Surgeon: Chad Clifton MD;  Location:  OR    BENCH LIVER  06/06/2023    Procedure: Bench liver;  Surgeon: Chad Clifton MD;  Location:  OR    COLONOSCOPY      2014 at [facility]     CV CORONARY ANGIOGRAM N/A 04/27/2023    Procedure: Coronary Angiogram;   Surgeon: Pablo Araujo MD;  Location: [facility]    EXPLORE GROIN N/A 12/10/2023    Procedure: Umbilical wound exploration, incision and drainage of abcess, exploratory laparotomy, mesh excision, small bowel primary repair;  Surgeon: Chad Clifton MD;  Location: [facility] OR    EYE SURGERY Bilateral     Cataract    H STATISTIC PICC LINE INSERTION >5YR, FAILED Left 2023    Unable to advance catheter over the axillary area    H STATISTIC PICC LINE INSERTION >5YR, FAILED      HERNIA REPAIR      History of bilateral inguinal hernia repair: 10/28/2014. Open hernia repair: 10/2017. Abdominal wound exploration and debridement 2017    HERNIORRHAPHY UMBILICAL N/A 2023    Procedure: HERNIORRHAPHY, UMBILICAL, OPEN, WITH MESH;  Surgeon: Chad Clifton MD;  Location: [facility] OR    INSERT SHUNT PORTAL TRANSJUGULAR INTRAHEPTIC  2022    IR CVC NON TUNNEL LINE REMOVAL  2023    IR CVC NON TUNNEL PLACEMENT > 5 YRS  2023    IR CVC TUNNEL PLACEMENT > 5 YRS OF AGE  2023    IR CVC TUNNEL PLACEMENT > 5 YRS OF AGE  2023    IR PICC PLACEMENT > 5 YRS OF AGE  2023    IR RENAL BIOPSY RIGHT  2023    picc failed attempt Right 2023    PICC failed attempt Right arm unable to thread the catheter in.    RETURN LIVER TRANSPLANT N/A 2023    Procedure: Return liver transplant. Intra-operative ultrasound;  Surgeon: Chad Clifton MD;  Location: [facility] OR    TRANSPLANT KIDNEY RECIPIENT  DONOR N/A 2023    Procedure: Transplant kidney recipient  donor, ureteral stent placement;  Surgeon: Chad Clifton MD;  Location: [facility]    TRANSPLANT LIVER RECIPIENT  DONOR N/A 2023    Procedure: Transplant liver recipient  donor;  Surgeon: Chad Clifton MD;  Location: [facility] OR       Social History     Social History     Socioeconomic History    Marital status:      Spouse name: Not on file    Number  of children: Not on file    Years of education: Not on file    Highest education level: Not on file   Occupational History    Not on file   Tobacco Use    Smoking status: Never     Passive exposure: Never    Smokeless tobacco: Never   Vaping Use    Vaping status: Never Used   Substance and Sexual Activity    Alcohol use: Not Currently     Comment: Last ETOH use was 12/31/2021    Drug use: Not Currently    Sexual activity: Yes   Other Topics Concern    Parent/sibling w/ CABG, MI or angioplasty before 65F 55M? Not Asked   Social History Narrative    Not on file     Social Determinants of Health     Financial Resource Strain: Not on file   Food Insecurity: Not on file   Transportation Needs: Not on file   Physical Activity: Sufficiently Active (3/27/2024)    Exercise Vital Sign     Days of Exercise per Week: 5 days     Minutes of Exercise per Session: 50 min   Stress: No Stress Concern Present (3/27/2024)    Citizen of the [place] [employer] - Occupational Stress Questionnaire     Feeling of Stress : Not at all   Social Connections: Unknown (3/27/2024)    Social Connection and Isolation Panel [NHANES]     Frequency of Communication with Friends and Family: Not on file     Frequency of Social Gatherings with Friends and Family: Twice a week     Attends Bahai Services: Not on file     Active Member of Clubs or Organizations: Not on file     Attends Club or Organization Meetings: Not on file     Marital Status: Not on file   Interpersonal Safety: Not on file   Housing Stability: Not on file       Family History     Family History   Problem Relation Age of Onset    Lung Cancer Mother     Colon Cancer Father     Lung Cancer Brother     Heart Failure Brother     Kidney Disease Brother        ROS     12 ROS completed, pertinent positive and negative in HPI    Physical Exam   There were no vitals taken for this visit.   GENERAL: alert and no distress  EYES: Eyes grossly normal to inspection.  No discharge or  "erythema, or obvious scleral/conjunctival abnormalities.  RESP: No audible wheeze, cough, or visible cyanosis.    SKIN: Visible skin clear. No significant rash, abnormal pigmentation or lesions.  NEURO: Cranial nerves grossly intact.  Mentation and speech appropriate for age.  PSYCH: Appropriate affect, tone, and pace of words     Labs/Imaging     Pertinent Labs were reviewed and updated in EPIC and discussed briefly.  Radiology Results were  reviewed and updated in EPIC and discussed briefly.    Summary of recent findings:   Lab Results   Component Value Date    A1C 6.4 12/28/2023    A1C 5.2 09/20/2023    A1C 5.1 06/06/2023    A1C 4.7 05/19/2023    A1C 5.3 11/22/2022       TSH   Date Value Ref Range Status   12/28/2023 2.93 0.30 - 4.20 uIU/mL Final   09/20/2023 0.73 0.30 - 4.20 uIU/mL Final   09/05/2023 7.03 (H) 0.30 - 4.20 uIU/mL Final   04/07/2023 3.37 0.30 - 4.20 uIU/mL Final   12/16/2022 2.86 0.30 - 4.20 uIU/mL Final     Free T4   Date Value Ref Range Status   09/05/2023 0.76 (L) 0.90 - 1.70 ng/dL Final   11/22/2022 1.09 0.90 - 1.70 ng/dL Final       Creatinine   Date Value Ref Range Status   05/30/2024 0.95 0.67 - 1.17 mg/dL Final   08/27/2019 1.11 0.73 - 1.18 mg/dL Final       Recent Labs   Lab Test 04/29/24  0812 12/28/23  1338   CHOL 119 159   HDL 39* 29*   LDL 52 83   TRIG 140 234*       No results found for: \"[id number]\", \"[id number]\", \"[id number]\"    I personally reviewed the patient's outside records from [facility] EMR and Care Everywhere. Summary of pertinent findings in HPI.    Impression / Plan     1. Hypothyroidism  Noted on labs 9/2023, on lt4 with appropriate replacement. Most recent TSH 2.93. he had some improvement in his fatigue. We reviewed his diagnosis today and the need for lt4 for life. Patient and wife do not believe that challenge him by stopping and retesting his TFT is worth it, which is reasonable.  Checking TFT now then yearly by PCP.    2. Avascular necrosis of the femur ( left), " asymptomatic , without femoral collapse.  Likely secondary to previous high dose steroids, post transplant and remote alcoholism. Not related to problem 3. PCP may consider orthopedic referral for ongoing care and follow-up.    3. Osteopenia, mild  Frax adjusted to steroids is not elevated, no indication for OP medication use. Continue adequate ca and vitd. Fall precaution. Follow-up dxa every 2 years.    Test and/or medications prescribed today:  No orders of the defined types were placed in this encounter.        Follow up: CESAR Baker MD  Endocrinology, Diabetes and Metabolism  [facility]

## 2024-06-19 ENCOUNTER — OFFICE VISIT (OUTPATIENT)
Dept: NEUROLOGY | Facility: CLINIC | Age: 69
End: 2024-06-19
Payer: COMMERCIAL

## 2024-06-19 VITALS
OXYGEN SATURATION: 98 % | DIASTOLIC BLOOD PRESSURE: 80 MMHG | RESPIRATION RATE: 16 BRPM | HEART RATE: 63 BPM | SYSTOLIC BLOOD PRESSURE: 137 MMHG

## 2024-06-19 DIAGNOSIS — G62.2 TOXIC NEUROPATHY (H): Primary | ICD-10-CM

## 2024-06-19 PROCEDURE — 99214 OFFICE O/P EST MOD 30 MIN: CPT | Performed by: PSYCHIATRY & NEUROLOGY

## 2024-06-19 ASSESSMENT — PAIN SCALES - GENERAL: PAINLEVEL: NO PAIN (0)

## 2024-06-19 NOTE — PROGRESS NOTES
History of neuropathy:    Damion Quinones is a 68 year old man with a polyneuropathy most likely related to CKD and alcohol toxicity. He also has lumbar spondylosis areas of severe neural foraminal stenosis. His history os notable for psoriatic arthritis treated with adalimumab (Humira) for about 6 to 8 years prior to August 2022.  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  He stopped adalimumab in 2022 and treated with prednisone, weaned to 5 mg daily in 2023. Around this time he was also found to have hepatocellular carcinoma and cirrhosis, likely related to previous chronic alcohol use.  He underwent liver transplant in June 2023. Since that time he has been on CellCept and tacrolimus, as well as taking prednisone 5 mg daily for his kidneys. Neuropathic symptoms in his feet (paresthesias and numbness) were first noticed in spring 2022. Those were stable even after the liver/kidney transplant, but he developed pain and paresthesias of his hands. He also endorsed right arm weakness. The possibility of PNS vasculitis was explored. Work up as detailed below showed no evidence for PNS vasculitis.    Interval history:   I last saw him 1/31/24. Overall he has done rather well. He feels that his balance has improved. He tries to exercise most days. He uses a recombonent bike, walking, and pool. He also does PT exercises at home. He continues to have parestheisas in his feet, but location and intensity is about the same. He does not have any new areas that were not previously affected. The left foot and left toes continue to be the main location of symptoms. He also continues to have low back pain. Our prior work up since our last visit included EMG and MRI of the LS spine. Those studies, as below, showed polyneuropathy and evidence of LS radiculopathies. He saw Dr. Mejia for this just a couple days ago. He has a plan for medial branch blocks.     Prior pertinent  laboratory work-up:  : Myeloperoxidase antibodies (ANCA) 39 (<3.5 normal)  2023: Normal SPEP, immunofixation, B12   2023: A1c 5.2  23: NFL normal (21.0)    Prior electrophysiologic work-up:  24: NCS/EMG showed 1) Moderate axonal, length-dependant sensorimotor polyneuropathy; 2) Left-sided active on chronic S1 radiculopathy; 3) Probable right-sided chronic S1 radiculopathy; 4) Right-sided peroneal neuropathy across the knee.     Prior pertinent imagin/10/24: MRI LS spine showed no abnormal signal or enhancement within the lumbar spinal cord or cauda equina. Lumbar spondylosis greatest at L5-S1 with moderate right and severe left neural foraminal stenosis. Mild spinal canal stenosis at L2-L3. Periarticular facet edema at L5-S1. Neuro foraminal stenosis, especially at left L5-S1 may explain patient's radiculopathy and pain.     Past Medical History:   Past Medical History:   Diagnosis Date    Alcoholic cirrhosis of liver with ascites (H) 10/11/2019    ANCA-associated vasculitis (H)     Antiplatelet or antithrombotic long-term use     Avascular necrosis (H)     Left femoral head    C. difficile colitis     C. difficile diarrhea     Coronary artery disease     [facility] 2023 - complete occlusion of RCA    ESRD (end stage renal disease) on dialysis (H)     Gout     HCC (hepatocellular carcinoma) (H) 2023    History of hemochromatosis 10/11/2019    Hypertension     Hypothyroidism 2023    IgA nephropathy     Obesity     PAF (paroxysmal atrial fibrillation) (H)     Pre-diabetes     Psoriatic arthritis (H)     RA (rheumatoid arthritis) (H)     Status post kidney transplant 2023    Induction with thymoglobulin 4mg/kg, + DSA CW9    Status post liver transplantation (H) 2023      Past Surgical History:  Past Surgical History:   Procedure Laterality Date    APPENDECTOMY      Removed at 18 Years Old     BENCH KIDNEY  2023    Procedure: Bench kidney;  Surgeon:  Chad Clifton MD;  Location: [facility] OR    BENCH LIVER  2023    Procedure: Bench liver;  Surgeon: Chad Clifton MD;  Location: [facility] OR    COLONOSCOPY      2014 at [facility]     CV CORONARY ANGIOGRAM N/A 2023    Procedure: Coronary Angiogram;  Surgeon: Pablo Araujo MD;  Location:  [facility]    EXPLORE GROIN N/A 12/10/2023    Procedure: Umbilical wound exploration, incision and drainage of abcess, exploratory laparotomy, mesh excision, small bowel primary repair;  Surgeon: Chad Clifton MD;  Location: [facility] OR    EYE SURGERY Bilateral     Cataract    H STATISTIC PICC LINE INSERTION >5YR, FAILED Left 2023    Unable to advance catheter over the axillary area    H STATISTIC PICC LINE INSERTION >5YR, FAILED      HERNIA REPAIR      History of bilateral inguinal hernia repair: 10/28/2014. Open hernia repair: 10/2017. Abdominal wound exploration and debridement 2017    HERNIORRHAPHY UMBILICAL N/A 2023    Procedure: HERNIORRHAPHY, UMBILICAL, OPEN, WITH MESH;  Surgeon: Chad Clifton MD;  Location: [facility] OR    INSERT SHUNT PORTAL TRANSJUGULAR INTRAHEPTIC  2022    IR CVC NON TUNNEL LINE REMOVAL  2023    IR CVC NON TUNNEL PLACEMENT > 5 YRS  2023    IR CVC TUNNEL PLACEMENT > 5 YRS OF AGE  2023    IR CVC TUNNEL PLACEMENT > 5 YRS OF AGE  2023    IR PICC PLACEMENT > 5 YRS OF AGE  2023    IR RENAL BIOPSY RIGHT  2023    picc failed attempt Right 2023    PICC failed attempt Right arm unable to thread the catheter in.    RETURN LIVER TRANSPLANT N/A 2023    Procedure: Return liver transplant. Intra-operative ultrasound;  Surgeon: Chad Clifton MD;  Location: [facility] OR    TRANSPLANT KIDNEY RECIPIENT  DONOR N/A 2023    Procedure: Transplant kidney recipient  donor, ureteral stent placement;  Surgeon: Chad Clifton MD;  Location: [facility] OR    TRANSPLANT LIVER RECIPIENT   DONOR N/A 2023    Procedure: Transplant liver recipient  donor;  Surgeon: Chad Clifton MD;  Location:  OR     Family history:    There is no known family history of hereditary neuropathies or other neuromuscular disorders.    Social History:    He denies tobacco, alcohol, or illicit drug use. There is no known exposure to toxins or heavy metals.     Medical Allergies:  NKDA     Current Medications:   Current Outpatient Medications:     Alcohol Swabs PADS, Use to swab the area of the injection or quyen as directed Per insurance coverage, Disp: 100 each, Rfl: 0    allopurinol (ZYLOPRIM) 100 MG tablet, Take 100 mg by mouth daily, Disp: , Rfl:     allopurinol (ZYLOPRIM) 300 MG tablet, Take 300 mg by mouth daily, Disp: , Rfl:     anakinra (KINERET) 100 MG/0.67ML SOSY injection, Inject 0.67 mLs (100 mg) Subcutaneous daily as needed, Disp: , Rfl:     apixaban ANTICOAGULANT (ELIQUIS ANTICOAGULANT) 5 MG tablet, Take 1 tablet (5 mg) by mouth 2 times daily, Disp: 180 tablet, Rfl: 3    atorvastatin (LIPITOR) 40 MG tablet, Take 1 tablet (40 mg) by mouth every evening, Disp: 90 tablet, Rfl: 3    Blood Glucose Monitoring Suppl (ONETOUCH VERIO REFLECT) w/Device KIT, , Disp: , Rfl:     capsaicin (ZOSTRIX) 0.025 % external cream, Apply to feet two times per day, Disp: 60 g, Rfl: 1    clotrimazole (LOTRIMIN) 1 % external cream, Apply topically 2 times daily For feet, Disp: 60 g, Rfl: 11    furosemide (LASIX) 20 MG tablet, TAKE 1 TABLET(20 MG) BY MOUTH DAILY, Disp: 90 tablet, Rfl: 3    gabapentin (NEURONTIN) 100 MG capsule, Take 1 capsule (100 mg) by mouth every morning AND 1 capsule (100 mg) daily (with lunch) AND 2 capsules (200 mg) every evening. 100 mg in am, 100 mg in the afternoon and 200 mg at bedtime by mouth daily, Disp: 360 capsule, Rfl: 3    levothyroxine (SYNTHROID/LEVOTHROID) 88 MCG tablet, Take 1 tablet (88 mcg) by mouth daily Takes in the middle of the night daily, Disp: 90 tablet, Rfl:  3    magnesium oxide (MAG-OX) 400 MG tablet, Take 2 tablets (800 mg) by mouth 2 times daily, Disp: 60 tablet, Rfl: 0    multivitamin w/minerals (THERA-VIT-M) tablet, Take 1 tablet by mouth daily, Disp: 30 tablet, Rfl: 0    mupirocin (BACTROBAN) 2 % external ointment, Apply topically 2 times daily, Disp: 60 g, Rfl: 3    MYFORTIC (BRAND) 180 MG EC tablet, Take 3 tablets (540 mg) by mouth 2 times daily, Disp: 540 tablet, Rfl: 3    pantoprazole (PROTONIX) 20 MG EC tablet, Take 1 tablet (20 mg) by mouth every morning (before breakfast), Disp: 90 tablet, Rfl: 3    predniSONE (DELTASONE) 5 MG tablet, Take 1 tablet (5 mg) by mouth daily, Disp: 90 tablet, Rfl: 3    Sharps Container MISC, Use as directed to dispose of needles, lancets and other sharps, Disp: 1 each, Rfl: 0    simethicone (MYLICON) 125 MG chewable tablet, Take 125 mg by mouth 4 times daily as needed for intestinal gas, Disp: , Rfl:     sulfamethoxazole-trimethoprim (BACTRIM) 400-80 MG tablet, Take 1 tablet by mouth daily, Disp: 90 tablet, Rfl: 3    tacrolimus (GENERIC EQUIVALENT) 0.5 MG capsule, Take 1 capsule (0.5 mg) by mouth 2 times daily Total dose 1.5 mg BID, Disp: 180 capsule, Rfl: 3    tacrolimus (GENERIC EQUIVALENT) 1 MG capsule, Take 1 capsule (1 mg) by mouth 2 times daily Total dose: 1mg BID, Disp: 180 capsule, Rfl: 3    tadalafil (CIALIS) 5 MG tablet, Take 1 tablet (5 mg) by mouth daily as needed (take 1-2 tablets at least 30 minutes prior to sexual activity.), Disp: 20 tablet, Rfl: 5    triamcinolone (KENALOG) 0.1 % external ointment, Apply topically 2 times daily, Disp: 454 g, Rfl: 3    ursodiol (ACTIGALL) 250 MG tablet, Take 1 tablet (250 mg) by mouth 2 times daily, Disp: 180 tablet, Rfl: 3    metoprolol tartrate (LOPRESSOR) 50 MG tablet, Take 1 tablet (50 mg) by mouth 2 times daily for 90 days, Disp: 180 tablet, Rfl: 3      Review of Systems: A complete review of systems was obtained and was negative except for what was noted above.    Physical  examination:    /80   Pulse 63   Resp 16   SpO2 98%      General Appearance: NAD    Skin: Psoriatic skin changes    Neurologic examination:    Mental status:  Patient is alert, attentive, and oriented x 3.  Language is coherent and fluent without aphasia.  Memory, comprehension and ability to follow commands were intact.       Cranial nerves:  Pupils were round and reacted to light.  Extraocular movements were full. There was no face, jaw, palate or tongue weakness or atrophy. Hearing was grossly intact.  Shoulder shrug was normal.      Motor exam: No atrophy or fasciculations.  Manual muscle testing revealed the following MRC grade muscle power:   Right Left   Neck flexion 5    Neck extension: 5    Shoulder abduction:  5 5   Elbow extension: 4+ 5   Elbow flexion:  4+ 5   Wrist flexion:  5 5   Wrist extension:  5 5   Finger extension 5 5   FDI 5 5   Hip flexion 5 5   Knee flexion 5 5   Knee extension 5 5   Dorsiflexion 5 5   Plantar flexion 5 5     Complex motor skills: No tremor or ataxia    Sensory: Vibration 12 - 13 seconds in toes. Pin intact in hands and feet. Light touch also intact.     Gait: Slightly wide based and antalgic favoring Left hip; otherwise normal stride length and turn.       Deep tendon reflexes:   Right Left   Triceps 2 2   Biceps 2 2   Brachioradialis 2 2   Knee jerk 0* 0*   Ankle jerk 0* 0*   *Complicated by bilateral knee arthoplasties and very swollen ankles from gout/psoriatic arthritis  Plantar responses were flexor bilaterally.       Assessment:    Damion Quinones is a 68 year old man with a large fiber length dependant axonal polyneuropathy most likely due to CKD and alcohol toxicity. He also has chronic low back pain with EMG and imaging evidence of multilevel LS radiculopathies. Although the possibility of PNS vasculitis was considered, fortunately this does not seem to be the case. His neuropathy is stable since our last visit. I agree with the plan as detailed by Dr. Mejia  for treatment of his back pain. I will see him back for neuropathy surveillance, but all considered I do not anticipate any changes provided his kidney status remains well controlled and he continues to abstain from alcohol. Management of that condition remains supportive as below.     Plan:      1. Symptomatic management of pain and paresthesias: He has good results with capsacin cream. Advised to increase from a few times per week to BID or TID applicatiomn. Also contiue gabapentin 100/200/1000.   2.  Supportive foot wear and optimization of foot hygiene   3. Agree with recommendations per Dr. Mejia for radiculopathy management  4.  Follow-up in 6 months.   ---

## 2024-06-19 NOTE — LETTER
6/19/2024       RE: Damion Quinones   [address] [facility] [address]       Dear Colleague,    Thank you for referring your patient, Damion Quinones, to the [facility] [place] [facility]. Please see a copy of my visit note below.    History of neuropathy:    Damion Quinones is a 68 year old man with a polyneuropathy most likely related to CKD and alcohol toxicity. He also has lumbar spondylosis areas of severe neural foraminal stenosis. His history os notable for psoriatic arthritis treated with adalimumab (Humira) for about 6 to 8 years prior to August 2022.  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  He stopped adalimumab in 2022 and treated with prednisone, weaned to 5 mg daily in 2023. Around this time he was also found to have hepatocellular carcinoma and cirrhosis, likely related to previous chronic alcohol use.  He underwent liver transplant in June 2023. Since that time he has been on CellCept and tacrolimus, as well as taking prednisone 5 mg daily for his kidneys. Neuropathic symptoms in his feet (paresthesias and numbness) were first noticed in spring 2022. Those were stable even after the liver/kidney transplant, but he developed pain and paresthesias of his hands. He also endorsed right arm weakness. The possibility of PNS vasculitis was explored. Work up as detailed below showed no evidence for PNS vasculitis.    Interval history:   I last saw him 1/31/24. Overall he has done rather well. He feels that his balance has improved. He tries to exercise most days. He uses a recombonent bike, walking, and pool. He also does PT exercises at home. He continues to have parestheisas in his feet, but location and intensity is about the same. He does not have any new areas that were not previously affected. The left foot and left toes continue to be the main location of symptoms. He also  continues to have low back pain. Our prior work up since our last visit included EMG and MRI of the LS spine. Those studies, as below, showed polyneuropathy and evidence of LS radiculopathies. He saw Dr. Mejia for this just a couple days ago. He has a plan for medial branch blocks.     Prior pertinent laboratory work-up:  : Myeloperoxidase antibodies (ANCA) 39 (<3.5 normal)  2023: Normal SPEP, immunofixation, B12   2023: A1c 5.2  23: NFL normal (21.0)    Prior electrophysiologic work-up:  24: NCS/EMG showed 1) Moderate axonal, length-dependant sensorimotor polyneuropathy; 2) Left-sided active on chronic S1 radiculopathy; 3) Probable right-sided chronic S1 radiculopathy; 4) Right-sided peroneal neuropathy across the knee.     Prior pertinent imagin/10/24: MRI LS spine showed no abnormal signal or enhancement within the lumbar spinal cord or cauda equina. Lumbar spondylosis greatest at L5-S1 with moderate right and severe left neural foraminal stenosis. Mild spinal canal stenosis at L2-L3. Periarticular facet edema at L5-S1. Neuro foraminal stenosis, especially at left L5-S1 may explain patient's radiculopathy and pain.     Past Medical History:   Past Medical History:   Diagnosis Date    Alcoholic cirrhosis of liver with ascites (H) 10/11/2019    ANCA-associated vasculitis (H)     Antiplatelet or antithrombotic long-term use     Avascular necrosis (H)     Left femoral head    C. difficile colitis     C. difficile diarrhea     Coronary artery disease     [facility] 2023 - complete occlusion of RCA    ESRD (end stage renal disease) on dialysis (H)     Gout     HCC (hepatocellular carcinoma) (H) 2023    History of hemochromatosis 10/11/2019    Hypertension     Hypothyroidism 2023    IgA nephropathy     Obesity     PAF (paroxysmal atrial fibrillation) (H)     Pre-diabetes     Psoriatic arthritis (H)     RA (rheumatoid arthritis) (H)     Status post kidney transplant  06/06/2023    Induction with thymoglobulin 4mg/kg, + DSA CW9    Status post liver transplantation (H) 06/06/2023      Past Surgical History:  Past Surgical History:   Procedure Laterality Date    APPENDECTOMY      Removed at 18 Years Old     BENCH KIDNEY  06/06/2023    Procedure: Bench kidney;  Surgeon: Chad Clifton MD;  Location: [facility] OR    BENCH LIVER  06/06/2023    Procedure: Bench liver;  Surgeon: Chad Clifton MD;  Location: [facility] OR    COLONOSCOPY      2014 at [facility]     CV CORONARY ANGIOGRAM N/A 04/27/2023    Procedure: Coronary Angiogram;  Surgeon: Pablo Araujo MD;  Location:  HE[facility] CARDIAC CATH LAB    EXPLORE GROIN N/A 12/10/2023    Procedure: Umbilical wound exploration, incision and drainage of abcess, exploratory laparotomy, mesh excision, small bowel primary repair;  Surgeon: Chad Clifton MD;  Location: [facility] OR    EYE SURGERY Bilateral     Cataract    H STATISTIC PICC LINE INSERTION >5YR, FAILED Left 06/16/2023    Unable to advance catheter over the axillary area    H STATISTIC PICC LINE INSERTION >5YR, FAILED      HERNIA REPAIR      History of bilateral inguinal hernia repair: 10/28/2014. Open hernia repair: 10/2017. Abdominal wound exploration and debridement 12/27/2017    HERNIORRHAPHY UMBILICAL N/A 12/07/2023    Procedure: HERNIORRHAPHY, UMBILICAL, OPEN, WITH MESH;  Surgeon: Chad Clifton MD;  Location:  OR    INSERT SHUNT PORTAL TRANSJUGULAR INTRAHEPTIC  09/26/2022    IR CVC NON TUNNEL LINE REMOVAL  07/03/2023    IR CVC NON TUNNEL PLACEMENT > 5 YRS  06/14/2023    IR CVC TUNNEL PLACEMENT > 5 YRS OF AGE  01/26/2023    IR CVC TUNNEL PLACEMENT > 5 YRS OF AGE  12/12/2023    IR PICC PLACEMENT > 5 YRS OF AGE  06/16/2023    IR RENAL BIOPSY RIGHT  06/20/2023    picc failed attempt Right 12/11/2023    PICC failed attempt Right arm unable to thread the catheter in.    RETURN LIVER TRANSPLANT N/A 06/06/2023    Procedure: Return liver transplant.  Intra-operative ultrasound;  Surgeon: Chad Clifton MD;  Location: [facility] OR    TRANSPLANT KIDNEY RECIPIENT  DONOR N/A 2023    Procedure: Transplant kidney recipient  donor, ureteral stent placement;  Surgeon: Chad Clifton MD;  Location: [facility] OR    TRANSPLANT LIVER RECIPIENT  DONOR N/A 2023    Procedure: Transplant liver recipient  donor;  Surgeon: Chad Clifton MD;  Location: [facility] OR     Family history:    There is no known family history of hereditary neuropathies or other neuromuscular disorders.    Social History:    He denies tobacco, alcohol, or illicit drug use. There is no known exposure to toxins or heavy metals.     Medical Allergies:  NKDA     Current Medications:   Current Outpatient Medications:     Alcohol Swabs PADS, Use to swab the area of the injection or quyen as directed Per insurance coverage, Disp: 100 each, Rfl: 0    allopurinol (ZYLOPRIM) 100 MG tablet, Take 100 mg by mouth daily, Disp: , Rfl:     allopurinol (ZYLOPRIM) 300 MG tablet, Take 300 mg by mouth daily, Disp: , Rfl:     anakinra (KINERET) 100 MG/0.67ML SOSY injection, Inject 0.67 mLs (100 mg) Subcutaneous daily as needed, Disp: , Rfl:     apixaban ANTICOAGULANT (ELIQUIS ANTICOAGULANT) 5 MG tablet, Take 1 tablet (5 mg) by mouth 2 times daily, Disp: 180 tablet, Rfl: 3    atorvastatin (LIPITOR) 40 MG tablet, Take 1 tablet (40 mg) by mouth every evening, Disp: 90 tablet, Rfl: 3    Blood Glucose Monitoring Suppl (ONETOUCH VERIO REFLECT) w/Device KIT, , Disp: , Rfl:     capsaicin (ZOSTRIX) 0.025 % external cream, Apply to feet two times per day, Disp: 60 g, Rfl: 1    clotrimazole (LOTRIMIN) 1 % external cream, Apply topically 2 times daily For feet, Disp: 60 g, Rfl: 11    furosemide (LASIX) 20 MG tablet, TAKE 1 TABLET(20 MG) BY MOUTH DAILY, Disp: 90 tablet, Rfl: 3    gabapentin (NEURONTIN) 100 MG capsule, Take 1 capsule (100 mg) by mouth every morning AND 1 capsule (100 mg) daily  (with lunch) AND 2 capsules (200 mg) every evening. 100 mg in am, 100 mg in the afternoon and 200 mg at bedtime by mouth daily, Disp: 360 capsule, Rfl: 3    levothyroxine (SYNTHROID/LEVOTHROID) 88 MCG tablet, Take 1 tablet (88 mcg) by mouth daily Takes in the middle of the night daily, Disp: 90 tablet, Rfl: 3    magnesium oxide (MAG-OX) 400 MG tablet, Take 2 tablets (800 mg) by mouth 2 times daily, Disp: 60 tablet, Rfl: 0    multivitamin w/minerals (THERA-VIT-M) tablet, Take 1 tablet by mouth daily, Disp: 30 tablet, Rfl: 0    mupirocin (BACTROBAN) 2 % external ointment, Apply topically 2 times daily, Disp: 60 g, Rfl: 3    MYFORTIC (BRAND) 180 MG EC tablet, Take 3 tablets (540 mg) by mouth 2 times daily, Disp: 540 tablet, Rfl: 3    pantoprazole (PROTONIX) 20 MG EC tablet, Take 1 tablet (20 mg) by mouth every morning (before breakfast), Disp: 90 tablet, Rfl: 3    predniSONE (DELTASONE) 5 MG tablet, Take 1 tablet (5 mg) by mouth daily, Disp: 90 tablet, Rfl: 3    Sharps Container MISC, Use as directed to dispose of needles, lancets and other sharps, Disp: 1 each, Rfl: 0    simethicone (MYLICON) 125 MG chewable tablet, Take 125 mg by mouth 4 times daily as needed for intestinal gas, Disp: , Rfl:     sulfamethoxazole-trimethoprim (BACTRIM) 400-80 MG tablet, Take 1 tablet by mouth daily, Disp: 90 tablet, Rfl: 3    tacrolimus (GENERIC EQUIVALENT) 0.5 MG capsule, Take 1 capsule (0.5 mg) by mouth 2 times daily Total dose 1.5 mg BID, Disp: 180 capsule, Rfl: 3    tacrolimus (GENERIC EQUIVALENT) 1 MG capsule, Take 1 capsule (1 mg) by mouth 2 times daily Total dose: 1mg BID, Disp: 180 capsule, Rfl: 3    tadalafil (CIALIS) 5 MG tablet, Take 1 tablet (5 mg) by mouth daily as needed (take 1-2 tablets at least 30 minutes prior to sexual activity.), Disp: 20 tablet, Rfl: 5    triamcinolone (KENALOG) 0.1 % external ointment, Apply topically 2 times daily, Disp: 454 g, Rfl: 3    ursodiol (ACTIGALL) 250 MG tablet, Take 1 tablet (250 mg)  by mouth 2 times daily, Disp: 180 tablet, Rfl: 3    metoprolol tartrate (LOPRESSOR) 50 MG tablet, Take 1 tablet (50 mg) by mouth 2 times daily for 90 days, Disp: 180 tablet, Rfl: 3      Review of Systems: A complete review of systems was obtained and was negative except for what was noted above.    Physical examination:    /80   Pulse 63   Resp 16   SpO2 98%      General Appearance: NAD    Skin: Psoriatic skin changes    Neurologic examination:    Mental status:  Patient is alert, attentive, and oriented x 3.  Language is coherent and fluent without aphasia.  Memory, comprehension and ability to follow commands were intact.       Cranial nerves:  Pupils were round and reacted to light.  Extraocular movements were full. There was no face, jaw, palate or tongue weakness or atrophy. Hearing was grossly intact.  Shoulder shrug was normal.      Motor exam: No atrophy or fasciculations.  Manual muscle testing revealed the following MRC grade muscle power:   Right Left   Neck flexion 5    Neck extension: 5    Shoulder abduction:  5 5   Elbow extension: 4+ 5   Elbow flexion:  4+ 5   Wrist flexion:  5 5   Wrist extension:  5 5   Finger extension 5 5   FDI 5 5   Hip flexion 5 5   Knee flexion 5 5   Knee extension 5 5   Dorsiflexion 5 5   Plantar flexion 5 5     Complex motor skills: No tremor or ataxia    Sensory: Vibration 12 - 13 seconds in toes. Pin intact in hands and feet. Light touch also intact.     Gait: Slightly wide based and antalgic favoring Left hip; otherwise normal stride length and turn.       Deep tendon reflexes:   Right Left   Triceps 2 2   Biceps 2 2   Brachioradialis 2 2   Knee jerk 0* 0*   Ankle jerk 0* 0*   *Complicated by bilateral knee arthoplasties and very swollen ankles from gout/psoriatic arthritis  Plantar responses were flexor bilaterally.       Assessment:    Damion Quinones is a 68 year old man with a large fiber length dependant axonal polyneuropathy most likely due to CKD and alcohol  toxicity. He also has chronic low back pain with EMG and imaging evidence of multilevel LS radiculopathies. Although the possibility of PNS vasculitis was considered, fortunately this does not seem to be the case. His neuropathy is stable since our last visit. I agree with the plan as detailed by Dr. Mejia for treatment of his back pain. I will see him back for neuropathy surveillance, but all considered I do not anticipate any changes provided his kidney status remains well controlled and he continues to abstain from alcohol. Management of that condition remains supportive as below.     Plan:      1. Symptomatic management of pain and paresthesias: He has good results with capsacin cream. Advised to increase from a few times per week to BID or TID applicatiomn. Also contiue gabapentin 100/200/1000.   2.  Supportive foot wear and optimization of foot hygiene   3. Agree with recommendations per Dr. Mejia for radiculopathy management  4.  Follow-up in 6 months.   ---      Again, thank you for allowing me to participate in the care of your patient.      Sincerely,    Rafael Delgado MD

## 2024-06-23 NOTE — PROGRESS NOTES
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 PMHx also includes CAD, alcohol overuse, obesity, psoriatic arthritis, paroxysmal atrial fibrillation, history of DVT and HTN.    #. 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 HCC noted on explant: moderately differentiated, 2.8cm. RETREAT SCORE: 1 (2.8cm tumor, AFP < 20, no vascular invasion)    Patient is doing well post liver and kidney transplant.  The patient has normal AST and ALT and a mildly elevated alkaline phosphatase.  He is adherent to his immunosuppression and ursodiol.  His immunosuppression is driven by nephrology given he underwent a simultaneous liver kidney transplant.    Given the patient was noted to have hepatocellular carcinoma on explant that was moderately differentiated he is due for liver cancer screening every 6 months for at least 2 years; he is due at this time    Plan  -- Tacrolimus immediate release (goal 6-8), Mycophenolic acid 540mg BID and prednisone 5 mg daily; monitoring of immunosuppression for efficacy and toxicity per protocol. Immunosuppression driven by nephrology given K.   -- Continue ursodiol 250mg BID  -- Continue statin given CAD; off aspirin given on apixiban per cardiology   -- HCC surveillance (RETREAT 1; low risk): CT chest + CT abdomen/pelvis and AFP every 6 months x 2 years  -- Prophylaxis   * PJP: Sulfa/TMP (Bactrim) indefinitely    * CMV: Valganciclovir (Valcyte)  -- Pantoprazole: can reduce to every other day 20mg, 30 minutes before meals  -- Continue complete alcohol abstinence  -- Continue calcium + vitamin D supplementation for osteopenia    Health  Maintenance:  -- CRC Surveillance: Repeat due in 2026  -- Skin Checks: due yearly, scheduled 1/2025  -- Lipids: due yearly  -- Vaccinations and health screening per PCP     RTC: 12 months    Lolis Bermudez MD (Lizzie)   of Medicine  Advanced & Transplant Hepatology  [facility]    I spent 40 minutes on this encounter performing the following: reviewing the patient's medical record (clinic visits, hospital records, lab results, imaging and procedural documentation), history taking, physical exam and documentation on the date of the encounter. I also spent part of the time in coordination of care and counseling.    The longitudinal plan of care for the diagnosis(es)/condition(s) as documented were addressed during this visit. Due to the added complexity in care, I will continue to support Casey in the subsequent management and with ongoing continuity of care.     HPI:  DBD LT (and kidney) 6/6/2023 for ETOH + hemochromatosis (homozygous H63D) cirrhosis  - operation: Caval replacement. Portal vein end-to-end anastomosis. Hepatic artery: there were 2 hepatic arteries the right and left; the right is connected to the splenic artery stump. Bile duct end-to-end anastomosis  - explant: 2.8cm HCC (moderately differentiated with no vascular or extra-hepatic extension) in the setting of MASLD cirrhosis  - post-op course   * RTOR on 6/6/203 with concern for low flow in the renal graft - ex-lap, washout, closure with healthy appearing graft with intact arterial and venous flow.    * RTOR on 12/7/23 for hernia repair -> 12/10/2023 for small bowel perforation repair (+Saccharomyces cerevisiae - treated with fluconazole)   * 12/2023: C. Diff in the setting of hospitalization   - immunosuppression: Tacrolimus + MMF + prednisone    Interval Events/Subjective     Patient presents with his wife.    He reports doing well without any acute concerns or complaints.    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 Currently on gabapentin + capsaicin cream.  He also has back pain.  With regards to his back pain it keeps him up at night such that he wakes up in the middle of the night and has difficulty going back to sleep.  Previously they had considered medial branch blocks and steroid injections for his back issues; he has a plan for a test injection in July.  He currently takes gabapentin 100 mg in the morning, 100 mg midday and 200 mg before he goes to bed.  He has no difficulty falling asleep.  He sleeps in a recliner.  He also takes Tylenol several times per week but only 2 tablets of 500 mg/day    He reports his energy level is pretty good.  He denies any issues with nausea, vomiting or abdominal pain.  He is frustrated by his ongoing overweight status.  He bikes for about an hour a day which ends up being about 13 miles per day.  He tries to be mindful of his weight but is interested in more support to help reduce his weight.    He reports adherence to his immunosuppression.    Medical hx Surgical hx   Past Medical History:   Diagnosis Date    ANCA-associated vasculitis (H) 2022    Antiplatelet or antithrombotic long-term use     Avascular necrosis (H)     Left femoral head    C. difficile colitis     Coronary artery disease     [facility] 4/2023 - complete occlusion of RCA    Gout     HCC (hepatocellular carcinoma) (H) 09/05/2023    History of hemochromatosis 10/11/2019    Hypertension     Hypothyroidism 12/19/2023    IgA nephropathy     Obesity     PAF (paroxysmal atrial fibrillation) (H)     Pre-diabetes 2023    Psoriatic arthritis (H)     RA (rheumatoid arthritis) (H)     Status post kidney transplant 06/06/2023    Induction with thymoglobulin 4mg/kg, + DSA CW9    Status post liver transplantation (H) 06/06/2023    for hx of alcohol related cirrhosis      Past Surgical History:   Procedure Laterality Date     APPENDECTOMY      Removed at 18 Years Old     BENCH KIDNEY  2023    Procedure: Bench kidney;  Surgeon: Chad Clifton MD;  Location: [facility] OR    BENCH LIVER  2023    Procedure: Bench liver;  Surgeon: Chad Clifton MD;  Location: [facility] OR    COLONOSCOPY      2014 at [facility]     CV CORONARY ANGIOGRAM N/A 2023    Procedure: Coronary Angiogram;  Surgeon: Pablo Araujo MD;  Location:  [facility]    EXPLORE GROIN N/A 12/10/2023    Procedure: Umbilical wound exploration, incision and drainage of abcess, exploratory laparotomy, mesh excision, small bowel primary repair;  Surgeon: Chad Clifton MD;  Location: [facility] OR    EYE SURGERY Bilateral     Cataract    H STATISTIC PICC LINE INSERTION >5YR, FAILED Left 2023    Unable to advance catheter over the axillary area    H STATISTIC PICC LINE INSERTION >5YR, FAILED      HERNIA REPAIR      History of bilateral inguinal hernia repair: 10/28/2014. Open hernia repair: 10/2017. Abdominal wound exploration and debridement 2017    HERNIORRHAPHY UMBILICAL N/A 2023    Procedure: HERNIORRHAPHY, UMBILICAL, OPEN, WITH MESH;  Surgeon: Chad Clifton MD;  Location: [facility] OR    INSERT SHUNT PORTAL TRANSJUGULAR INTRAHEPTIC  2022    IR CVC NON TUNNEL LINE REMOVAL  2023    IR CVC NON TUNNEL PLACEMENT > 5 YRS  2023    IR CVC TUNNEL PLACEMENT > 5 YRS OF AGE  2023    IR CVC TUNNEL PLACEMENT > 5 YRS OF AGE  2023    IR PICC PLACEMENT > 5 YRS OF AGE  2023    IR RENAL BIOPSY RIGHT  2023    picc failed attempt Right 2023    PICC failed attempt Right arm unable to thread the catheter in.    RETURN LIVER TRANSPLANT N/A 2023    Procedure: Return liver transplant. Intra-operative ultrasound;  Surgeon: Chad Clifton MD;  Location: [facility] OR    TRANSPLANT KIDNEY RECIPIENT  DONOR N/A 2023    Procedure: Transplant kidney recipient  donor,  ureteral stent placement;  Surgeon: Chad Clifton MD;  Location: [facility] OR    TRANSPLANT LIVER RECIPIENT  DONOR N/A 2023    Procedure: Transplant liver recipient  donor;  Surgeon: Chad Clifton MD;  Location: [facility] OR   + knee replacements x 2       Medications  Current Outpatient Medications   Medication Sig Dispense Refill    Alcohol Swabs PADS Use to swab the area of the injection or quyen as directed Per insurance coverage 100 each 0    allopurinol (ZYLOPRIM) 100 MG tablet Take 100 mg by mouth daily      allopurinol (ZYLOPRIM) 300 MG tablet Take 300 mg by mouth daily 300 mg and 100 mg total dose 400 mg daily      anakinra (KINERET) 100 MG/0.67ML SOSY injection Inject 100 mg Subcutaneous twice a week      apixaban ANTICOAGULANT (ELIQUIS ANTICOAGULANT) 5 MG tablet Take 1 tablet (5 mg) by mouth 2 times daily 180 tablet 3    atorvastatin (LIPITOR) 40 MG tablet Take 1 tablet (40 mg) by mouth every evening 90 tablet 3    capsaicin (ZOSTRIX) 0.025 % external cream Apply to feet two times per day 60 g 1    clotrimazole (LOTRIMIN) 1 % external cream Apply topically 2 times daily For feet 60 g 11    furosemide (LASIX) 20 MG tablet TAKE 1 TABLET(20 MG) BY MOUTH DAILY 90 tablet 3    gabapentin (NEURONTIN) 100 MG capsule Take 1 capsule (100 mg) by mouth every morning AND 1 capsule (100 mg) daily (with lunch) AND 3 capsules (300 mg) every evening. 100 mg in am, 100 mg in the afternoon and 200 mg at bedtime by mouth daily 360 capsule 3    levothyroxine (SYNTHROID/LEVOTHROID) 88 MCG tablet Take 1 tablet (88 mcg) by mouth daily Takes in the middle of the night daily 90 tablet 3    magnesium oxide (MAG-OX) 400 MG tablet Take 2 tablets (800 mg) by mouth 2 times daily 60 tablet 0    metoprolol tartrate (LOPRESSOR) 50 MG tablet Take 1 tablet (50 mg) by mouth 2 times daily for 90 days 180 tablet 3    multivitamin w/minerals (THERA-VIT-M) tablet Take 1 tablet by mouth daily 30 tablet 0    mupirocin  (BACTROBAN) 2 % external ointment Apply topically 2 times daily 60 g 3    MYFORTIC (BRAND) 180 MG EC tablet Take 3 tablets (540 mg) by mouth 2 times daily 540 tablet 3    pantoprazole (PROTONIX) 20 MG EC tablet Take 1 tablet (20 mg) by mouth every morning (before breakfast) 90 tablet 3    predniSONE (DELTASONE) 5 MG tablet Take 1 tablet (5 mg) by mouth daily 90 tablet 3    Sharps Container MISC Use as directed to dispose of needles, lancets and other sharps 1 each 0    simethicone (MYLICON) 125 MG chewable tablet Take 125 mg by mouth 4 times daily as needed for intestinal gas      sulfamethoxazole-trimethoprim (BACTRIM) 400-80 MG tablet Take 1 tablet by mouth daily 90 tablet 3    tacrolimus (GENERIC EQUIVALENT) 1 MG capsule Take 1 capsule (1 mg) by mouth 2 times daily Total dose: 1mg  capsule 3    tadalafil (CIALIS) 5 MG tablet Take 1 tablet (5 mg) by mouth daily as needed (take 1-2 tablets at least 30 minutes prior to sexual activity.) 20 tablet 5    triamcinolone (KENALOG) 0.1 % external ointment Apply topically 2 times daily 454 g 3    ursodiol (ACTIGALL) 250 MG tablet Take 1 tablet (250 mg) by mouth 2 times daily 180 tablet 3    Blood Glucose Monitoring Suppl (ONETOUCH VERIO REFLECT) w/Device KIT  (Patient not taking: Reported on 6/25/2024)      tacrolimus (GENERIC EQUIVALENT) 0.5 MG capsule Take 1 capsule (0.5 mg) by mouth 2 times daily Total dose 1.5 mg BID (Patient not taking: Reported on 6/25/2024) 180 capsule 3       Allergies  No Known Allergies    Family hx Social hx   Family History   Problem Relation Age of Onset    Lung Cancer Mother     Colon Cancer Father     Lung Cancer Brother     Heart Failure Brother     Kidney Disease Brother    - Brother: lung cancer in 50s  - Brother: heart disease (unclear what), renal disease (unclear what)  - Brother: colon issues (unclear what)   - Alcohol: None since prior to transplant   - Tobacco: Denies  - Drugs: Denies  -  (Apoorva), has two adult children  "  - Retired, previously in IT. Hasn't worked sine 2019.  - Stays active.  He has been using up recumbent bike every other day and then walking on days where he does not bike.     Review of systems  A 10-point review of systems was negative.    Examination  BP (!) 132/91 (BP Location: Right arm, Patient Position: Sitting, Cuff Size: Adult Large)   Pulse 61   Temp 98.1  F (36.7  C) (Oral)   Resp 18   Ht 1.676 m (5' 6\")   Wt 101.6 kg (224 lb)   SpO2 99%   BMI 36.15 kg/m    Body mass index is 36.15 kg/m .    Gen-NAD  Eye- No conjunctival icterus  ENT- MMM  CVS- RRR, no murmurs  RS- CTA bilaterally  Abd-overweight, soft, nontender.  Surgical scars well-healed   Extr- 2+ radial pulses bilaterally, no lower extremity edema bilaterally  MS- hands without clubbing  Neuro- A+Ox3  Skin- No jaundice. Duptyrens contractures bilaterally.  Gout in right hand.   Psych- normal mood    Laboratory  BMP  Recent Labs   Lab Test 06/25/24  0755 05/30/24  0804 04/29/24  0812 03/25/24  1305    139 139 140   POTASSIUM 4.1 4.3 4.1 4.2   CHLORIDE 101 102 102 102   NAZARIO 10.0 9.2 9.4 9.4   CO2 28 26 26 27   BUN 18.7 16.3 16.9 21.9   CR 1.10 0.95 1.04 0.97   GLC 93 112* 94 94     CBC  Recent Labs   Lab Test 06/25/24  0755 05/30/24  0804 04/29/24  0812 03/25/24  1305   WBC 5.5 6.1 7.0 6.2  6.2   RBC 4.99 5.24 5.17 5.08   HGB 14.8 15.0 14.8 14.7   HCT 46.1 48.0 47.5 46.3   MCV 92 92 92 91   MCH 29.7 28.6 28.6 28.9   MCHC 32.1 31.3* 31.2* 31.7   RDW 15.0 14.9 15.1* 15.6*    164 182 182     Liver Enzymes   Recent Labs   Lab Test 06/25/24  0755   PROTTOTAL 6.6   ALBUMIN 4.3   BILITOTAL 0.6   ALKPHOS 153*   AST 24   ALT 25      INR   INR   Date Value Ref Range Status   12/11/2023 1.92 (H) 0.85 - 1.15 Final       Iron sat 41%; ferritin 896  MIGUEL A positive  Hep A IgG negative  Hep B s Ag negative  Hep B s Ab < 2.0  Hep B c Ab negative  Hep C ab negative    Hemochromatosis: negative C282Y, homozygous H63D, negative " S65C    Radiology  Abdominal US 12/8/2023  1. Examination is partially limited due to overlying bowel gas.  2. The visualized hepatic vasculature is patent with antegrade flow.  3. Normal grayscale appearance of the transplant liver.  4. Mild splenomegaly.  5. Small volume ascites.    CT Chest w/o Contrast 12/2023  IMPRESSION: No CT evidence of metastatic disease to the lungs. Small  left pleural effusion with associated atelectasis increased from June.  Resolution of previous small right pleural effusion from June.  Atherosclerosis with aortic valve leaflet calcifications comment  please correlate for valvular stenosis. Mitral annular calcifications  also present, please correlate for mitral valve abnormality.    CT Abdomen Pelvis 12/2023  1. Small amount of residual free fluid is present within the abdomen  without any free air to suggest viscus perforation.  2. Stable postsurgical changes of liver and right lower quadrant renal  transplants.  3. Postsurgical changes of umbilical hernia repair with small amount  of air and fluid in the umbilicus, presumably postsurgical.  4. Increased mild anasarca and moderate left pleural effusion.  5. Splenomegaly.  6. Unchanged avascular necrosis of the left femoral head without  subchondral collapse.    Endoscopy  EGD 9/2022: Small EV, IGV1 oozing, portal HTN gastropathy, IGV2, no gastric ulcers, normal duodenum    Colonoscopy 11/2023    Path: Tubular adenoma x3

## 2024-06-25 ENCOUNTER — OFFICE VISIT (OUTPATIENT)
Dept: TRANSPLANT | Facility: CLINIC | Age: 69
End: 2024-06-25
Attending: INTERNAL MEDICINE
Payer: COMMERCIAL

## 2024-06-25 ENCOUNTER — OFFICE VISIT (OUTPATIENT)
Dept: GASTROENTEROLOGY | Facility: CLINIC | Age: 69
End: 2024-06-25
Attending: INTERNAL MEDICINE
Payer: COMMERCIAL

## 2024-06-25 ENCOUNTER — LAB (OUTPATIENT)
Dept: LAB | Facility: CLINIC | Age: 69
End: 2024-06-25
Attending: INTERNAL MEDICINE
Payer: COMMERCIAL

## 2024-06-25 VITALS
HEART RATE: 61 BPM | BODY MASS INDEX: 36 KG/M2 | DIASTOLIC BLOOD PRESSURE: 91 MMHG | HEIGHT: 66 IN | SYSTOLIC BLOOD PRESSURE: 132 MMHG | RESPIRATION RATE: 18 BRPM | OXYGEN SATURATION: 99 % | TEMPERATURE: 98.1 F | WEIGHT: 224 LBS

## 2024-06-25 VITALS
TEMPERATURE: 97.9 F | OXYGEN SATURATION: 98 % | BODY MASS INDEX: 36.15 KG/M2 | HEART RATE: 70 BPM | SYSTOLIC BLOOD PRESSURE: 147 MMHG | WEIGHT: 224 LBS | DIASTOLIC BLOOD PRESSURE: 97 MMHG

## 2024-06-25 DIAGNOSIS — Z29.89 NEED FOR PNEUMOCYSTIS PROPHYLAXIS: ICD-10-CM

## 2024-06-25 DIAGNOSIS — E66.812 CLASS 2 SEVERE OBESITY DUE TO EXCESS CALORIES WITH SERIOUS COMORBIDITY AND BODY MASS INDEX (BMI) OF 36.0 TO 36.9 IN ADULT (H): ICD-10-CM

## 2024-06-25 DIAGNOSIS — G62.9 PERIPHERAL POLYNEUROPATHY: ICD-10-CM

## 2024-06-25 DIAGNOSIS — E66.01 CLASS 2 SEVERE OBESITY DUE TO EXCESS CALORIES WITH SERIOUS COMORBIDITY AND BODY MASS INDEX (BMI) OF 36.0 TO 36.9 IN ADULT (H): ICD-10-CM

## 2024-06-25 DIAGNOSIS — D84.9 IMMUNOSUPPRESSED STATUS (H): ICD-10-CM

## 2024-06-25 DIAGNOSIS — F10.21 ALCOHOL DEPENDENCE, IN REMISSION (H): ICD-10-CM

## 2024-06-25 DIAGNOSIS — G62.9 NEUROPATHY: ICD-10-CM

## 2024-06-25 DIAGNOSIS — I15.1 HTN, KIDNEY TRANSPLANT RELATED: ICD-10-CM

## 2024-06-25 DIAGNOSIS — Z94.4 LIVER TRANSPLANT RECIPIENT (H): ICD-10-CM

## 2024-06-25 DIAGNOSIS — Z48.298 AFTERCARE FOLLOWING ORGAN TRANSPLANT: ICD-10-CM

## 2024-06-25 DIAGNOSIS — Z94.4 LIVER REPLACED BY TRANSPLANT (H): ICD-10-CM

## 2024-06-25 DIAGNOSIS — Z94.0 KIDNEY REPLACED BY TRANSPLANT: ICD-10-CM

## 2024-06-25 DIAGNOSIS — K70.30 ALCOHOLIC CIRRHOSIS (H): ICD-10-CM

## 2024-06-25 DIAGNOSIS — E83.42 HYPOMAGNESEMIA: ICD-10-CM

## 2024-06-25 DIAGNOSIS — N08 GLOMERULONEPHRITIS DUE TO ANTINEUTROPHIL CYTOPLASMIC ANTIBODY (ANCA) POSITIVE VASCULITIS (H): ICD-10-CM

## 2024-06-25 DIAGNOSIS — Z94.0 HTN, KIDNEY TRANSPLANT RELATED: ICD-10-CM

## 2024-06-25 DIAGNOSIS — E83.110 HEREDITARY HEMOCHROMATOSIS (H): ICD-10-CM

## 2024-06-25 DIAGNOSIS — Z94.4 LIVER REPLACED BY TRANSPLANT (H): Primary | ICD-10-CM

## 2024-06-25 DIAGNOSIS — K21.9 GASTROESOPHAGEAL REFLUX DISEASE, UNSPECIFIED WHETHER ESOPHAGITIS PRESENT: ICD-10-CM

## 2024-06-25 DIAGNOSIS — L40.50 PSORIATIC ARTHRITIS (H): ICD-10-CM

## 2024-06-25 DIAGNOSIS — N18.2 CKD (CHRONIC KIDNEY DISEASE) STAGE 2, GFR 60-89 ML/MIN: ICD-10-CM

## 2024-06-25 DIAGNOSIS — E03.9 HYPOTHYROIDISM, UNSPECIFIED TYPE: ICD-10-CM

## 2024-06-25 DIAGNOSIS — I48.0 PAROXYSMAL ATRIAL FIBRILLATION (H): Primary | ICD-10-CM

## 2024-06-25 DIAGNOSIS — I77.82 GLOMERULONEPHRITIS DUE TO ANTINEUTROPHIL CYTOPLASMIC ANTIBODY (ANCA) POSITIVE VASCULITIS (H): ICD-10-CM

## 2024-06-25 LAB
ALBUMIN MFR UR ELPH: 9.1 MG/DL
ALBUMIN SERPL BCG-MCNC: 4.3 G/DL (ref 3.5–5.2)
ALBUMIN UR-MCNC: NEGATIVE MG/DL
ALP SERPL-CCNC: 153 U/L (ref 40–150)
ALT SERPL W P-5'-P-CCNC: 25 U/L (ref 0–70)
ANION GAP SERPL CALCULATED.3IONS-SCNC: 12 MMOL/L (ref 7–15)
APPEARANCE UR: CLEAR
AST SERPL W P-5'-P-CCNC: 24 U/L (ref 0–45)
BILIRUB DIRECT SERPL-MCNC: <0.2 MG/DL (ref 0–0.3)
BILIRUB SERPL-MCNC: 0.6 MG/DL
BILIRUB UR QL STRIP: NEGATIVE
BUN SERPL-MCNC: 18.7 MG/DL (ref 8–23)
CALCIUM SERPL-MCNC: 10 MG/DL (ref 8.8–10.2)
CHLORIDE SERPL-SCNC: 101 MMOL/L (ref 98–107)
COLOR UR AUTO: ABNORMAL
CREAT SERPL-MCNC: 1.1 MG/DL (ref 0.67–1.17)
CREAT UR-MCNC: 83.6 MG/DL
DEPRECATED HCO3 PLAS-SCNC: 28 MMOL/L (ref 22–29)
EGFRCR SERPLBLD CKD-EPI 2021: 74 ML/MIN/1.73M2
ERYTHROCYTE [DISTWIDTH] IN BLOOD BY AUTOMATED COUNT: 15 % (ref 10–15)
GLUCOSE SERPL-MCNC: 93 MG/DL (ref 70–99)
GLUCOSE UR STRIP-MCNC: NEGATIVE MG/DL
HCT VFR BLD AUTO: 46.1 % (ref 40–53)
HGB BLD-MCNC: 14.8 G/DL (ref 13.3–17.7)
HGB UR QL STRIP: NEGATIVE
KETONES UR STRIP-MCNC: NEGATIVE MG/DL
LEUKOCYTE ESTERASE UR QL STRIP: ABNORMAL
MAGNESIUM SERPL-MCNC: 1.7 MG/DL (ref 1.7–2.3)
MCH RBC QN AUTO: 29.7 PG (ref 26.5–33)
MCHC RBC AUTO-ENTMCNC: 32.1 G/DL (ref 31.5–36.5)
MCV RBC AUTO: 92 FL (ref 78–100)
NITRATE UR QL: NEGATIVE
PH UR STRIP: 7.5 [PH] (ref 5–7)
PHOSPHATE SERPL-MCNC: 3.7 MG/DL (ref 2.5–4.5)
PLATELET # BLD AUTO: 166 10E3/UL (ref 150–450)
POTASSIUM SERPL-SCNC: 4.1 MMOL/L (ref 3.4–5.3)
PROT SERPL-MCNC: 6.6 G/DL (ref 6.4–8.3)
PROT/CREAT 24H UR: 0.11 MG/MG CR (ref 0–0.2)
RBC # BLD AUTO: 4.99 10E6/UL (ref 4.4–5.9)
RBC URINE: 1 /HPF
SODIUM SERPL-SCNC: 141 MMOL/L (ref 135–145)
SP GR UR STRIP: 1.01 (ref 1–1.03)
TACROLIMUS BLD-MCNC: 6.6 UG/L (ref 5–15)
TME LAST DOSE: NORMAL H
TME LAST DOSE: NORMAL H
TSH SERPL DL<=0.005 MIU/L-ACNC: 2.26 UIU/ML (ref 0.3–4.2)
UROBILINOGEN UR STRIP-MCNC: NORMAL MG/DL
WBC # BLD AUTO: 5.5 10E3/UL (ref 4–11)
WBC URINE: 10 /HPF

## 2024-06-25 PROCEDURE — 81001 URINALYSIS AUTO W/SCOPE: CPT | Mod: XU | Performed by: PATHOLOGY

## 2024-06-25 PROCEDURE — 99215 OFFICE O/P EST HI 40 MIN: CPT | Performed by: INTERNAL MEDICINE

## 2024-06-25 PROCEDURE — 83735 ASSAY OF MAGNESIUM: CPT | Performed by: PATHOLOGY

## 2024-06-25 PROCEDURE — 84156 ASSAY OF PROTEIN URINE: CPT | Mod: XU | Performed by: PATHOLOGY

## 2024-06-25 PROCEDURE — 36415 COLL VENOUS BLD VENIPUNCTURE: CPT | Performed by: PATHOLOGY

## 2024-06-25 PROCEDURE — 99000 SPECIMEN HANDLING OFFICE-LAB: CPT | Performed by: PATHOLOGY

## 2024-06-25 PROCEDURE — G2211 COMPLEX E/M VISIT ADD ON: HCPCS | Performed by: INTERNAL MEDICINE

## 2024-06-25 PROCEDURE — 80197 ASSAY OF TACROLIMUS: CPT | Performed by: INTERNAL MEDICINE

## 2024-06-25 PROCEDURE — 99213 OFFICE O/P EST LOW 20 MIN: CPT | Mod: 27 | Performed by: INTERNAL MEDICINE

## 2024-06-25 PROCEDURE — G0480 DRUG TEST DEF 1-7 CLASSES: HCPCS | Mod: 90 | Performed by: PATHOLOGY

## 2024-06-25 PROCEDURE — 80180 DRUG SCRN QUAN MYCOPHENOLATE: CPT | Performed by: INTERNAL MEDICINE

## 2024-06-25 PROCEDURE — 82248 BILIRUBIN DIRECT: CPT | Performed by: PATHOLOGY

## 2024-06-25 PROCEDURE — 84443 ASSAY THYROID STIM HORMONE: CPT | Performed by: PATHOLOGY

## 2024-06-25 PROCEDURE — 85027 COMPLETE CBC AUTOMATED: CPT | Performed by: PATHOLOGY

## 2024-06-25 PROCEDURE — 84080 ASSAY ALKALINE PHOSPHATASES: CPT | Mod: 90 | Performed by: PATHOLOGY

## 2024-06-25 PROCEDURE — 99213 OFFICE O/P EST LOW 20 MIN: CPT | Performed by: INTERNAL MEDICINE

## 2024-06-25 PROCEDURE — 80053 COMPREHEN METABOLIC PANEL: CPT | Performed by: PATHOLOGY

## 2024-06-25 PROCEDURE — 84100 ASSAY OF PHOSPHORUS: CPT | Performed by: PATHOLOGY

## 2024-06-25 RX ORDER — GABAPENTIN 100 MG/1
CAPSULE ORAL
Qty: 360 CAPSULE | Refills: 3 | Status: SHIPPED | OUTPATIENT
Start: 2024-06-25 | End: 2024-07-28

## 2024-06-25 RX ORDER — GABAPENTIN 100 MG/1
CAPSULE ORAL
Qty: 360 CAPSULE | Refills: 3 | Status: SHIPPED | OUTPATIENT
Start: 2024-06-25 | End: 2024-06-25

## 2024-06-25 RX ORDER — METOPROLOL SUCCINATE 25 MG/1
1 TABLET, EXTENDED RELEASE ORAL
COMMUNITY
Start: 2023-10-10 | End: 2024-06-25

## 2024-06-25 ASSESSMENT — PAIN SCALES - GENERAL
PAINLEVEL: NO PAIN (0)
PAINLEVEL: NO PAIN (0)

## 2024-06-25 NOTE — PATIENT INSTRUCTIONS
- Pantoprazole: try every other day  - Gabapentin: 100mg morning + 100mg mid day + 200mg at night + 100mg mid night (if not sleeping)

## 2024-06-25 NOTE — LETTER
6/25/2024      Damion Quinones   [address] [address]      Dear Colleague,    Thank you for referring your patient, Damion Quinones, to the [facility] [place]. Please see a copy of my visit note below.    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 PMHx also includes CAD, alcohol overuse, obesity, psoriatic arthritis, paroxysmal atrial fibrillation, history of DVT and HTN.    #. 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 HCC noted on explant: moderately differentiated, 2.8cm. RETREAT SCORE: 1 (2.8cm tumor, AFP < 20, no vascular invasion)    Patient is doing well post liver and kidney transplant.  The patient has normal AST and ALT and a mildly elevated alkaline phosphatase.  He is adherent to his immunosuppression and ursodiol.  His immunosuppression is driven by nephrology given he underwent a simultaneous liver kidney transplant.    Given the patient was noted to have hepatocellular carcinoma on explant that was moderately differentiated he is due for liver cancer screening every 6 months for at least 2 years; he is due at this time    Plan  -- Tacrolimus immediate release (goal 6-8), Mycophenolic acid 540mg BID and prednisone 5 mg daily; monitoring of immunosuppression for efficacy and toxicity per protocol. Immunosuppression driven by nephrology given K.   -- Continue ursodiol 250mg BID  -- Continue statin given CAD; off aspirin given on apixiban per cardiology   -- HCC surveillance (RETREAT 1; low risk): CT chest + CT abdomen/pelvis and AFP every 6 months x 2 years  -- Prophylaxis   * PJP: Sulfa/TMP (Bactrim)  indefinitely    * CMV: Valganciclovir (Valcyte)  -- Pantoprazole: can reduce to every other day 20mg, 30 minutes before meals  -- Continue complete alcohol abstinence  -- Continue calcium + vitamin D supplementation for osteopenia    Health Maintenance:  -- CRC Surveillance: Repeat due in 2026  -- Skin Checks: due yearly, scheduled 1/2025  -- Lipids: due yearly  -- Vaccinations and health screening per PCP     RTC: 12 months    Lolis Bermudez MD (Lizzie)   of Medicine  Advanced & Transplant Hepatology  [facility]    I spent 40 minutes on this encounter performing the following: reviewing the patient's medical record (clinic visits, hospital records, lab results, imaging and procedural documentation), history taking, physical exam and documentation on the date of the encounter. I also spent part of the time in coordination of care and counseling.    The longitudinal plan of care for the diagnosis(es)/condition(s) as documented were addressed during this visit. Due to the added complexity in care, I will continue to support Casey in the subsequent management and with ongoing continuity of care.     HPI:  DBD LT (and kidney) 6/6/2023 for ETOH + hemochromatosis (homozygous H63D) cirrhosis  - operation: Caval replacement. Portal vein end-to-end anastomosis. Hepatic artery: there were 2 hepatic arteries the right and left; the right is connected to the splenic artery stump. Bile duct end-to-end anastomosis  - explant: 2.8cm HCC (moderately differentiated with no vascular or extra-hepatic extension) in the setting of MASLD cirrhosis  - post-op course   * RTOR on 6/6/203 with concern for low flow in the renal graft - ex-lap, washout, closure with healthy appearing graft with intact arterial and venous flow.    * RTOR on 12/7/23 for hernia repair -> 12/10/2023 for small bowel perforation repair (+Saccharomyces cerevisiae - treated with fluconazole)   * 12/2023: C. Diff in the  setting of hospitalization   - immunosuppression: Tacrolimus + MMF + prednisone    Interval Events/Subjective     Patient presents with his wife.    He reports doing well without any acute concerns or complaints.    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 Currently on gabapentin + capsaicin cream.  He also has back pain.  With regards to his back pain it keeps him up at night such that he wakes up in the middle of the night and has difficulty going back to sleep.  Previously they had considered medial branch blocks and steroid injections for his back issues; he has a plan for a test injection in July.  He currently takes gabapentin 100 mg in the morning, 100 mg midday and 200 mg before he goes to bed.  He has no difficulty falling asleep.  He sleeps in a recliner.  He also takes Tylenol several times per week but only 2 tablets of 500 mg/day    He reports his energy level is pretty good.  He denies any issues with nausea, vomiting or abdominal pain.  He is frustrated by his ongoing overweight status.  He bikes for about an hour a day which ends up being about 13 miles per day.  He tries to be mindful of his weight but is interested in more support to help reduce his weight.    He reports adherence to his immunosuppression.    Medical hx Surgical hx   Past Medical History:   Diagnosis Date     ANCA-associated vasculitis (H) 2022     Antiplatelet or antithrombotic long-term use      Avascular necrosis (H)     Left femoral head     C. difficile colitis      Coronary artery disease     [facility] 4/2023 - complete occlusion of RCA     Gout      HCC (hepatocellular carcinoma) (H) 09/05/2023     History of hemochromatosis 10/11/2019     Hypertension      Hypothyroidism 12/19/2023     IgA nephropathy      Obesity      PAF (paroxysmal atrial fibrillation) (H)      Pre-diabetes 2023     Psoriatic arthritis (H)      RA (rheumatoid  arthritis) (H)      Status post kidney transplant 06/06/2023    Induction with thymoglobulin 4mg/kg, + DSA CW9     Status post liver transplantation (H) 06/06/2023    for hx of alcohol related cirrhosis      Past Surgical History:   Procedure Laterality Date     APPENDECTOMY      Removed at 18 Years Old      BENCH KIDNEY  06/06/2023    Procedure: Bench kidney;  Surgeon: Chad Clifton MD;  Location:  OR     BENCH LIVER  06/06/2023    Procedure: Bench liver;  Surgeon: Chad Clifton MD;  Location:  OR     COLONOSCOPY      2014 at [facility]      CV CORONARY ANGIOGRAM N/A 04/27/2023    Procedure: Coronary Angiogram;  Surgeon: Pablo Araujo MD;  Location:  HEART CARDIAC CATH LAB     EXPLORE GROIN N/A 12/10/2023    Procedure: Umbilical wound exploration, incision and drainage of abcess, exploratory laparotomy, mesh excision, small bowel primary repair;  Surgeon: Chad Clifton MD;  Location:  OR     EYE SURGERY Bilateral     Cataract     H STATISTIC PICC LINE INSERTION >5YR, FAILED Left 06/16/2023    Unable to advance catheter over the axillary area     H STATISTIC PICC LINE INSERTION >5YR, FAILED       HERNIA REPAIR      History of bilateral inguinal hernia repair: 10/28/2014. Open hernia repair: 10/2017. Abdominal wound exploration and debridement 12/27/2017     HERNIORRHAPHY UMBILICAL N/A 12/07/2023    Procedure: HERNIORRHAPHY, UMBILICAL, OPEN, WITH MESH;  Surgeon: Chad Clifton MD;  Location:  OR     INSERT SHUNT PORTAL TRANSJUGULAR INTRAHEPTIC  09/26/2022     IR CVC NON TUNNEL LINE REMOVAL  07/03/2023     IR CVC NON TUNNEL PLACEMENT > 5 YRS  06/14/2023     IR CVC TUNNEL PLACEMENT > 5 YRS OF AGE  01/26/2023     IR CVC TUNNEL PLACEMENT > 5 YRS OF AGE  12/12/2023     IR PICC PLACEMENT > 5 YRS OF AGE  06/16/2023     IR RENAL BIOPSY RIGHT  06/20/2023     picc failed attempt Right 12/11/2023    PICC failed attempt Right arm unable to thread the catheter in.      RETURN LIVER TRANSPLANT N/A 2023    Procedure: Return liver transplant. Intra-operative ultrasound;  Surgeon: Chad Clifton MD;  Location: [facility] OR     TRANSPLANT KIDNEY RECIPIENT  DONOR N/A 2023    Procedure: Transplant kidney recipient  donor, ureteral stent placement;  Surgeon: Chad Clifton MD;  Location: [facility] OR     TRANSPLANT LIVER RECIPIENT  DONOR N/A 2023    Procedure: Transplant liver recipient  donor;  Surgeon: Chad Clifton MD;  Location: [facility] OR   + knee replacements x 2       Medications  Current Outpatient Medications   Medication Sig Dispense Refill     Alcohol Swabs PADS Use to swab the area of the injection or quyen as directed Per insurance coverage 100 each 0     allopurinol (ZYLOPRIM) 100 MG tablet Take 100 mg by mouth daily       allopurinol (ZYLOPRIM) 300 MG tablet Take 300 mg by mouth daily 300 mg and 100 mg total dose 400 mg daily       anakinra (KINERET) 100 MG/0.67ML SOSY injection Inject 100 mg Subcutaneous twice a week       apixaban ANTICOAGULANT (ELIQUIS ANTICOAGULANT) 5 MG tablet Take 1 tablet (5 mg) by mouth 2 times daily 180 tablet 3     atorvastatin (LIPITOR) 40 MG tablet Take 1 tablet (40 mg) by mouth every evening 90 tablet 3     capsaicin (ZOSTRIX) 0.025 % external cream Apply to feet two times per day 60 g 1     clotrimazole (LOTRIMIN) 1 % external cream Apply topically 2 times daily For feet 60 g 11     furosemide (LASIX) 20 MG tablet TAKE 1 TABLET(20 MG) BY MOUTH DAILY 90 tablet 3     gabapentin (NEURONTIN) 100 MG capsule Take 1 capsule (100 mg) by mouth every morning AND 1 capsule (100 mg) daily (with lunch) AND 3 capsules (300 mg) every evening. 100 mg in am, 100 mg in the afternoon and 200 mg at bedtime by mouth daily 360 capsule 3     levothyroxine (SYNTHROID/LEVOTHROID) 88 MCG tablet Take 1 tablet (88 mcg) by mouth daily Takes in the middle of the night daily 90 tablet 3     magnesium oxide (MAG-OX) 400 MG  tablet Take 2 tablets (800 mg) by mouth 2 times daily 60 tablet 0     metoprolol tartrate (LOPRESSOR) 50 MG tablet Take 1 tablet (50 mg) by mouth 2 times daily for 90 days 180 tablet 3     multivitamin w/minerals (THERA-VIT-M) tablet Take 1 tablet by mouth daily 30 tablet 0     mupirocin (BACTROBAN) 2 % external ointment Apply topically 2 times daily 60 g 3     MYFORTIC (BRAND) 180 MG EC tablet Take 3 tablets (540 mg) by mouth 2 times daily 540 tablet 3     pantoprazole (PROTONIX) 20 MG EC tablet Take 1 tablet (20 mg) by mouth every morning (before breakfast) 90 tablet 3     predniSONE (DELTASONE) 5 MG tablet Take 1 tablet (5 mg) by mouth daily 90 tablet 3     Sharps Container MISC Use as directed to dispose of needles, lancets and other sharps 1 each 0     simethicone (MYLICON) 125 MG chewable tablet Take 125 mg by mouth 4 times daily as needed for intestinal gas       sulfamethoxazole-trimethoprim (BACTRIM) 400-80 MG tablet Take 1 tablet by mouth daily 90 tablet 3     tacrolimus (GENERIC EQUIVALENT) 1 MG capsule Take 1 capsule (1 mg) by mouth 2 times daily Total dose: 1mg  capsule 3     tadalafil (CIALIS) 5 MG tablet Take 1 tablet (5 mg) by mouth daily as needed (take 1-2 tablets at least 30 minutes prior to sexual activity.) 20 tablet 5     triamcinolone (KENALOG) 0.1 % external ointment Apply topically 2 times daily 454 g 3     ursodiol (ACTIGALL) 250 MG tablet Take 1 tablet (250 mg) by mouth 2 times daily 180 tablet 3     Blood Glucose Monitoring Suppl (ONETOUCH VERIO REFLECT) w/Device KIT  (Patient not taking: Reported on 6/25/2024)       tacrolimus (GENERIC EQUIVALENT) 0.5 MG capsule Take 1 capsule (0.5 mg) by mouth 2 times daily Total dose 1.5 mg BID (Patient not taking: Reported on 6/25/2024) 180 capsule 3       Allergies  No Known Allergies    Family hx Social hx   Family History   Problem Relation Age of Onset     Lung Cancer Mother      Colon Cancer Father      Lung Cancer Brother      Heart  "Failure Brother      Kidney Disease Brother    - Brother: lung cancer in 50s  - Brother: heart disease (unclear what), renal disease (unclear what)  - Brother: colon issues (unclear what)   - Alcohol: None since prior to transplant   - Tobacco: Denies  - Drugs: Denies  -  (Apoorva), has two adult children   - Retired, previously in IT. Hasn't worked sine 2019.  - Stays active.  He has been using up recumbent bike every other day and then walking on days where he does not bike.     Review of systems  A 10-point review of systems was negative.    Examination  BP (!) 132/91 (BP Location: Right arm, Patient Position: Sitting, Cuff Size: Adult Large)   Pulse 61   Temp 98.1  F (36.7  C) (Oral)   Resp 18   Ht 1.676 m (5' 6\")   Wt 101.6 kg (224 lb)   SpO2 99%   BMI 36.15 kg/m    Body mass index is 36.15 kg/m .    Gen-NAD  Eye- No conjunctival icterus  ENT- MMM  CVS- RRR, no murmurs  RS- CTA bilaterally  Abd-overweight, soft, nontender.  Surgical scars well-healed   Extr- 2+ radial pulses bilaterally, no lower extremity edema bilaterally  MS- hands without clubbing  Neuro- A+Ox3  Skin- No jaundice. Duptyrens contractures bilaterally.  Gout in right hand.   Psych- normal mood    Laboratory  BMP  Recent Labs   Lab Test 06/25/24 0755 05/30/24  0804 04/29/24  0812 03/25/24  1305    139 139 140   POTASSIUM 4.1 4.3 4.1 4.2   CHLORIDE 101 102 102 102   NAZARIO 10.0 9.2 9.4 9.4   CO2 28 26 26 27   BUN 18.7 16.3 16.9 21.9   CR 1.10 0.95 1.04 0.97   GLC 93 112* 94 94     CBC  Recent Labs   Lab Test 06/25/24 0755 05/30/24  0804 04/29/24  0812 03/25/24  1305   WBC 5.5 6.1 7.0 6.2  6.2   RBC 4.99 5.24 5.17 5.08   HGB 14.8 15.0 14.8 14.7   HCT 46.1 48.0 47.5 46.3   MCV 92 92 92 91   MCH 29.7 28.6 28.6 28.9   MCHC 32.1 31.3* 31.2* 31.7   RDW 15.0 14.9 15.1* 15.6*    164 182 182     Liver Enzymes   Recent Labs   Lab Test 06/25/24  0755   PROTTOTAL 6.6   ALBUMIN 4.3   BILITOTAL 0.6   ALKPHOS 153*   AST 24   ALT 25    "   INR   INR   Date Value Ref Range Status   12/11/2023 1.92 (H) 0.85 - 1.15 Final       Iron sat 41%; ferritin 896  MIGUEL A positive  Hep A IgG negative  Hep B s Ag negative  Hep B s Ab < 2.0  Hep B c Ab negative  Hep C ab negative    Hemochromatosis: negative C282Y, homozygous H63D, negative S65C    Radiology  Abdominal US 12/8/2023  1. Examination is partially limited due to overlying bowel gas.  2. The visualized hepatic vasculature is patent with antegrade flow.  3. Normal grayscale appearance of the transplant liver.  4. Mild splenomegaly.  5. Small volume ascites.    CT Chest w/o Contrast 12/2023  IMPRESSION: No CT evidence of metastatic disease to the lungs. Small  left pleural effusion with associated atelectasis increased from June.  Resolution of previous small right pleural effusion from June.  Atherosclerosis with aortic valve leaflet calcifications comment  please correlate for valvular stenosis. Mitral annular calcifications  also present, please correlate for mitral valve abnormality.    CT Abdomen Pelvis 12/2023  1. Small amount of residual free fluid is present within the abdomen  without any free air to suggest viscus perforation.  2. Stable postsurgical changes of liver and right lower quadrant renal  transplants.  3. Postsurgical changes of umbilical hernia repair with small amount  of air and fluid in the umbilicus, presumably postsurgical.  4. Increased mild anasarca and moderate left pleural effusion.  5. Splenomegaly.  6. Unchanged avascular necrosis of the left femoral head without  subchondral collapse.    Endoscopy  EGD 9/2022: Small EV, IGV1 oozing, portal HTN gastropathy, IGV2, no gastric ulcers, normal duodenum    Colonoscopy 11/2023    Path: Tubular adenoma x3      Again, thank you for allowing me to participate in the care of your patient.        Sincerely,        Lolis Bermudez MD

## 2024-06-25 NOTE — NURSING NOTE
"Chief Complaint   Patient presents with    RECHECK     post liver transplant 6/6/2023     Vital signs:  Temp: 98.1  F (36.7  C) Temp src: Oral BP: (!) 132/91 Pulse: 61   Resp: 18 SpO2: 99 %     Height: 167.6 cm (5' 6\") Weight: 101.6 kg (224 lb)  Estimated body mass index is 36.15 kg/m  as calculated from the following:    Height as of this encounter: 1.676 m (5' 6\").    Weight as of this encounter: 101.6 kg (224 lb).      Etta Geller, [facility]  6/25/2024 9:20 AM    "

## 2024-06-25 NOTE — PROGRESS NOTES
TRANSPLANT NEPHROLOGY CLINIC VISIT     Assessment & Plan   # DDKT (K): CKD Stage 2 - Stable   - Baseline Creatinine: ~ 0.8-1.1   - Proteinuria: Normal (<0.2 grams)   - DSA Hx: No DSA   - Last cPRA: none noted in immunology   - BK Viremia: No   - Kidney Tx Biopsy Hx:  Jun 20, 2023; Result: No diagnostic evidence of acute rejection.  Focal acute tubular injury.  Mild arterial sclerosis. .    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  Transaminases Stable.  Followed by Hepatology.     # Immunosuppression: Tacrolimus immediate release (goal 6-8), Mycophenolic acid (dose 540 mg every 12 hours), and Prednisone (dose 5 mg daily)   - Induction with Recent Transplant:  Per Liver Tx Protocol   - Continue with intensive monitoring of immunosuppression for efficacy and toxicity.   - Historical Changes in Immunosuppression: None   - Changes: Yes - DSA pending, will change goal to 4-6 provided his DSA is negative again ,      # Infection Prevention:      - PJP: Sulfa/TMP (Bactrim)  - CMV IgG Ab High Risk Discordance (D+/R-): No  - EBV IgG Ab High Risk Discordance (D+/R-): No    # Hypertension: Controlled;  Goal BP: < 140/90   - Changes: No    # Diabetes: Controlled (HbA1c <7%) Last HbA1c: 6.4%    # Anemia in Chronic Renal Disease: Hgb: Stable      FEDERICO: No   - Iron studies: Not checked recently    # Mineral Bone Disorder:    - Secondary renal hyperparathyroidism; PTH level: Normal (15-65 pg/ml)        On treatment: None  - Vitamin D; level: Normal        On supplement: No  - Calcium; level: Normal        On supplement: No  - Phosphorus; level: Normal        On supplement: No    # Electrolytes:   - Potassium; level: Normal        On supplement: No  - Magnesium; level: Normal        On supplement: Yes  - Bicarbonate; level: Normal        On supplement: No  - Sodium; level: Normal    # Other Significant PMH:   - 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  Now off Valcyte.     - CAD, s/p PCI: Asymptomatic.      - Paroxysmal Atrial Fibrillation:   No palpitations on a beta blocker.  Remains on chronic anticoagulation with apixaban.     - Neuropathy:EMG showed moderate axonal, length dependent sensorimotor polyneuropathy. 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  No evidence of recurrence.     - Obesity, Class II (BMI = 34.45kg/m3): Stable weight.              - Recommend weight loss for overall health by increasing exercise and watching caloric intake.     - GERD: Mostly controlled with PPI, but also use of simethicone.     - Gout: Improved. He has been on allopurinol 400mg daily for the past 6 weeks and remains on low dose prednisone 5mg daily.  Stable symptoms. Uric acid 4.4 today              -Anakinra twice a week. No recent episodes     - H/o Clostridium difficile Colitis: Patient was positive during last hospitalization and treated with oral vancomycin.  No diarrhea symptoms at present.      - Umbilical Hernia Repair: Patient is s/p redo mesh repair of umbilical hernia and minimal DHEERAJ of small bowel 12/7/23. Patient then represented 12/8/23 from home with severe LLQ abdominal pain and N/V 12/8/23.  Back to OR 12/10 and found to have bowel leak and repaired. No issues since.     # Skin Cancer Risk:    - Discussed sun protection and recommend regular follow up with Dermatology.    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    Return visit: Return in about 6 months (around  12/25/2024).    Marbella Marrero MD    The longitudinal plan of care for the diagnosis(es)/condition(s) as documented were addressed during this visit. Due to the added complexity in care, I will continue to support Casey in the subsequent management and with ongoing continuity of care.      Chief Complaint   Mr. Quinones is a 68 year old here for kidney transplant and immunosuppression management.     History of Present Illness    Mr. Quinones reports feeling stable overall.  Since last clinic visit:   Hospitalizations: No   New Medical Issues: No  Chest pain or shortness of breath: No  Lower extremity swelling: No  Weight change: initially gained weight post transplant,now stabilized and trying to loose weight and made a plan with Dr. Bermudez during hepatology visit.  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)    HTN, kidney transplant related    Hereditary hemochromatosis (H24)    Dyslipidemia    Tophaceous gout    Deep vein thrombosis (DVT) of non-extremity vein, unspecified chronicity    CKD (chronic kidney disease) stage 2, GFR 60-89 ml/min    Paroxysmal atrial fibrillation (H)    Liver replaced by transplant (H)    Immunosuppressed status (H24)    Kidney replaced by transplant    CAD (coronary artery disease)    Aftercare following organ transplant    Peripheral neuropathy    Anemia in stage 2 chronic kidney disease    Hypothyroidism    Hypomagnesemia    GERD (gastroesophageal reflux disease)    Avascular necrosis of bone (H)    Alcohol dependence, in remission (H)    Lumbar radicular pain    Foraminal stenosis of lumbar region    Stenosis of lateral recess of lumbar spine    Lumbar facet arthropathy    Myofascial pain    Class 2 severe  obesity due to excess calories with serious comorbidity in adult (H)       Allergies   No Known Allergies    Medications   Current Outpatient Medications   Medication Sig Dispense Refill    Alcohol Swabs PADS Use to swab the area of the injection or quyen as directed Per insurance coverage 100 each 0    allopurinol (ZYLOPRIM) 100 MG tablet Take 100 mg by mouth daily      allopurinol (ZYLOPRIM) 300 MG tablet Take 300 mg by mouth daily 300 mg and 100 mg total dose 400 mg daily      anakinra (KINERET) 100 MG/0.67ML SOSY injection Inject 100 mg Subcutaneous twice a week      apixaban ANTICOAGULANT (ELIQUIS ANTICOAGULANT) 5 MG tablet Take 1 tablet (5 mg) by mouth 2 times daily 180 tablet 3    atorvastatin (LIPITOR) 40 MG tablet Take 1 tablet (40 mg) by mouth every evening 90 tablet 3    Blood Glucose Monitoring Suppl (ONETOUCH VERIO REFLECT) w/Device KIT  (Patient not taking: Reported on 6/25/2024)      capsaicin (ZOSTRIX) 0.025 % external cream Apply to feet two times per day 60 g 1    clotrimazole (LOTRIMIN) 1 % external cream Apply topically 2 times daily For feet 60 g 11    furosemide (LASIX) 20 MG tablet TAKE 1 TABLET(20 MG) BY MOUTH DAILY 90 tablet 3    gabapentin (NEURONTIN) 100 MG capsule Take 1 capsule (100 mg) by mouth every morning AND 1 capsule (100 mg) daily (with lunch) AND 2 capsules (200 mg) every evening. 100 mg in am, 100 mg in the afternoon and 200 mg at bedtime by mouth daily 360 capsule 3    levothyroxine (SYNTHROID/LEVOTHROID) 88 MCG tablet Take 1 tablet (88 mcg) by mouth daily Takes in the middle of the night daily 90 tablet 3    magnesium oxide (MAG-OX) 400 MG tablet Take 2 tablets (800 mg) by mouth 2 times daily 60 tablet 0    metoprolol tartrate (LOPRESSOR) 50 MG tablet Take 1 tablet (50 mg) by mouth 2 times daily for 90 days 180 tablet 3    multivitamin w/minerals (THERA-VIT-M) tablet Take 1 tablet by mouth daily 30 tablet 0    mupirocin (BACTROBAN) 2 % external ointment Apply topically 2 times  daily 60 g 3    MYFORTIC (BRAND) 180 MG EC tablet Take 3 tablets (540 mg) by mouth 2 times daily 540 tablet 3    pantoprazole (PROTONIX) 20 MG EC tablet Take 1 tablet (20 mg) by mouth every morning (before breakfast) 90 tablet 3    predniSONE (DELTASONE) 5 MG tablet Take 1 tablet (5 mg) by mouth daily 90 tablet 3    Sharps Container MISC Use as directed to dispose of needles, lancets and other sharps 1 each 0    simethicone (MYLICON) 125 MG chewable tablet Take 125 mg by mouth 4 times daily as needed for intestinal gas      sulfamethoxazole-trimethoprim (BACTRIM) 400-80 MG tablet Take 1 tablet by mouth daily 90 tablet 3    tacrolimus (GENERIC EQUIVALENT) 0.5 MG capsule Take 1 capsule (0.5 mg) by mouth 2 times daily Total dose 1.5 mg BID (Patient not taking: Reported on 6/25/2024) 180 capsule 3    tacrolimus (GENERIC EQUIVALENT) 1 MG capsule Take 1 capsule (1 mg) by mouth 2 times daily Total dose: 1mg  capsule 3    tadalafil (CIALIS) 5 MG tablet Take 1 tablet (5 mg) by mouth daily as needed (take 1-2 tablets at least 30 minutes prior to sexual activity.) 20 tablet 5    triamcinolone (KENALOG) 0.1 % external ointment Apply topically 2 times daily 454 g 3    ursodiol (ACTIGALL) 250 MG tablet Take 1 tablet (250 mg) by mouth 2 times daily 180 tablet 3     No current facility-administered medications for this visit.     There are no discontinued medications.    Physical Exam   Vital Signs: BP (!) 147/97 (BP Location: Right arm, Patient Position: Sitting, Cuff Size: Adult Regular)   Pulse 70   Temp 97.9  F (36.6  C) (Oral)   Wt 101.6 kg (224 lb)   SpO2 98%   BMI 36.15 kg/m      GENERAL APPEARANCE: alert and no distress  EYES: eyes grossly normal to inspection  HENT: normal cephalic/atraumatic  RESP: lungs clear to auscultation - no rales, rhonchi or wheezes  CV: regular rhythm, normal rate, no rub, no murmur  EDEMA: no LE edema bilaterally  ABDOMEN: soft, nondistended, nontender, bowel sounds normal  MS:  extremities normal - no gross deformities noted, no evidence of inflammation in joints, no muscle tenderness  SKIN: no rash  NEURO: mentation intact and speech normal  PSYCH: mentation appears normal and affect normal/bright  TX KIDNEY: normal    Data         Latest Ref Rng & Units 6/25/2024     7:55 AM 5/30/2024     8:04 AM 4/29/2024     8:12 AM   Renal   Sodium 135 - 145 mmol/L 141  139  139    K 3.4 - 5.3 mmol/L 4.1  4.3  4.1    Cl 98 - 107 mmol/L 101  102  102    Cl (external) 98 - 107 mmol/L 101  102  102    CO2 22 - 29 mmol/L 28  26  26    Urea Nitrogen 8.0 - 23.0 mg/dL 18.7  16.3  16.9    Creatinine 0.67 - 1.17 mg/dL 1.10  0.95  1.04    Glucose 70 - 99 mg/dL 93  112  94    Calcium 8.8 - 10.2 mg/dL 10.0  9.2  9.4    Magnesium 1.7 - 2.3 mg/dL 1.7  1.8  1.8          Latest Ref Rng & Units 6/25/2024     7:55 AM 5/30/2024     8:04 AM 4/29/2024     8:12 AM   Bone Health   Phosphorus 2.5 - 4.5 mg/dL 3.7  3.5  3.6          Latest Ref Rng & Units 6/25/2024     7:55 AM 5/30/2024     8:04 AM 4/29/2024     8:12 AM   Heme   WBC 4.0 - 11.0 10e3/uL 5.5  6.1  7.0    Hgb 13.3 - 17.7 g/dL 14.8  15.0  14.8    Plt 150 - 450 10e3/uL 166  164  182          Latest Ref Rng & Units 6/25/2024     7:55 AM 5/30/2024     8:04 AM 4/29/2024     8:12 AM   Liver   AP 40 - 150 U/L 153  165  171    TBili <=1.2 mg/dL 0.6  0.6  0.5    Bilirubin Direct 0.00 - 0.30 mg/dL <0.20  <0.20  <0.20    ALT 0 - 70 U/L 25  26  22    AST 0 - 45 U/L 24  24  22    Tot Protein 6.4 - 8.3 g/dL 6.6  6.2  6.2    Albumin 3.5 - 5.2 g/dL 4.3  4.2  4.0          Latest Ref Rng & Units 12/28/2023    11:54 AM 9/20/2023     4:11 PM 6/7/2023     3:30 AM   Pancreas   A1C <5.7 % 6.4  5.2     Amylase 28 - 100 U/L   90    Lipase (Roche) 13 - 60 U/L   59          Latest Ref Rng & Units 4/29/2024     8:12 AM 3/25/2024     1:05 PM 2/2/2024     8:13 AM   Iron studies   Iron 61 - 157 ug/dL 58  41  50    Iron Sat Index 15 - 46 % 25  17  24    Ferritin 31 - 409 ng/mL 410 305 337           Latest Ref Rng & Units 11/21/2023     7:42 AM 10/27/2023     8:21 AM 10/24/2023     8:06 AM   [facility] Virology   LOG IU/ML OF CMVQNT  1.7      BK Quant Log Ext log IU/mL  Undetected  Undetected    BK Quant Result Ext Undetected IU/mL  Undetected  Undetected    BK Quant Spec Ext    Plasma      Failed to redirect to the Timeline version of the REVFS SmartLink.  Recent Labs   Lab Test 04/05/24  0801 04/29/24  0812 05/30/24  0804   DOSTAC 4/4/2024 4/28/2024 5/29/2024   TACROL 7.5 6.8 7.8     Recent Labs   Lab Test 12/28/23  1154 02/02/24  0813 03/25/24  1305 04/29/24  0812   DOSMPA 12/27/2023   9:00 PM 2/1/2024   8:00 PM  --   --    MPACID <0.25* 1.35 1.76 1.51   MPAG 60.9 62.7 41.9 51.1     Prescription drug management  45 minutes spent by me on the date of the encounter doing chart review, history and exam, documentation and further activities per the note

## 2024-06-25 NOTE — LETTER
6/25/2024      Damion Quinones   [address] [facility] [address]      Dear Colleague,    Thank you for referring your patient, Damion Quinones, to the [facility] TRANSPLANT [facility]. Please see a copy of my visit note below.    TRANSPLANT NEPHROLOGY CLINIC VISIT     Assessment & Plan  # DDKT (SLK): CKD Stage 2 - Stable   - Baseline Creatinine: ~ 0.8-1.1   - Proteinuria: Normal (<0.2 grams)   - DSA Hx: No DSA   - Last cPRA: none noted in immunology   - BK Viremia: No   - Kidney Tx Biopsy Hx:  Jun 20, 2023; Result: No diagnostic evidence of acute rejection.  Focal acute tubular injury.  Mild arterial sclerosis. .    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  Transaminases Stable.  Followed by Hepatology.     # Immunosuppression: Tacrolimus immediate release (goal 6-8), Mycophenolic acid (dose 540 mg every 12 hours), and Prednisone (dose 5 mg daily)   - Induction with Recent Transplant:  Per Liver Tx Protocol   - Continue with intensive monitoring of immunosuppression for efficacy and toxicity.   - Historical Changes in Immunosuppression: None   - Changes: Yes - DSA pending, will change goal to 4-6 provided his DSA is negative again ,      # Infection Prevention:      - PJP: Sulfa/TMP (Bactrim)  - CMV IgG Ab High Risk Discordance (D+/R-): No  - EBV IgG Ab High Risk Discordance (D+/R-): No    # Hypertension: Controlled;  Goal BP: < 140/90   - Changes: No    # Diabetes: Controlled (HbA1c <7%) Last HbA1c: 6.4%    # Anemia in Chronic Renal Disease: Hgb: Stable      FEDERICO: No   - Iron studies: Not checked recently    # Mineral Bone Disorder:    - Secondary renal hyperparathyroidism; PTH level: Normal (15-65 pg/ml)        On treatment: None  - Vitamin D; level: Normal        On supplement: No  - Calcium; level: Normal        On supplement: No  - Phosphorus; level: Normal        On supplement: No    # Electrolytes:   - Potassium;  level: Normal        On supplement: No  - Magnesium; level: Normal        On supplement: Yes  - Bicarbonate; level: Normal        On supplement: No  - Sodium; level: Normal    # Other Significant PMH:   - 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  Now off Valcyte.     - CAD, s/p PCI: Asymptomatic.      - Paroxysmal Atrial Fibrillation:   No palpitations on a beta blocker.  Remains on chronic anticoagulation with apixaban.     - Neuropathy:EMG showed moderate axonal, length dependent sensorimotor polyneuropathy. 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  No evidence of recurrence.     - Obesity, Class II (BMI = 34.45kg/m3): Stable weight.              - Recommend weight loss for overall health by increasing exercise and watching caloric intake.     - GERD: Mostly controlled with PPI, but also use of simethicone.     - Gout: Improved. He has been on allopurinol 400mg daily for the past 6 weeks and remains on low dose prednisone 5mg daily.  Stable symptoms. Uric acid 4.4 today              -Anakinra twice a week. No recent episodes     - H/o Clostridium difficile Colitis: Patient was positive during last hospitalization and treated with oral vancomycin.  No diarrhea symptoms at present.      - Umbilical Hernia Repair: Patient is s/p redo mesh repair of umbilical hernia and minimal DHEERAJ of small bowel 12/7/23. Patient then represented 12/8/23 from home with severe LLQ abdominal pain and N/V 12/8/23.  Back to OR 12/10 and found to have bowel leak and repaired. No issues since.     # Skin Cancer Risk:    - Discussed sun protection and recommend regular follow up with Dermatology.    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    Return visit: Return in about 6 months (around 12/25/2024).    Marbella Marrero MD    The longitudinal plan of care for the diagnosis(es)/condition(s) as documented were addressed during this visit. Due to the added complexity in care, I will continue to support Casey in the subsequent management and with ongoing continuity of care.      Chief Complaint  Mr. Quinones is a 68 year old here for kidney transplant and immunosuppression management.     History of Present Illness   Mr. Quinones reports feeling stable overall.  Since last clinic visit:   Hospitalizations: No   New Medical Issues: No  Chest pain or shortness of breath: No  Lower extremity swelling: No  Weight change: initially gained weight post transplant,now stabilized and trying to loose weight and made a plan with Dr. Bermudez during hepatology visit.  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)     HTN, kidney transplant related     Hereditary hemochromatosis (H24)     Dyslipidemia     Tophaceous gout     Deep vein thrombosis (DVT) of non-extremity vein, unspecified chronicity     CKD (chronic kidney disease) stage 2, GFR 60-89 ml/min     Paroxysmal atrial fibrillation (H)     Liver replaced by transplant (H)     Immunosuppressed status (H24)     Kidney replaced by transplant     CAD (coronary artery disease)     Aftercare following organ transplant     Peripheral neuropathy     Anemia in stage 2 chronic kidney disease     Hypothyroidism     Hypomagnesemia     GERD (gastroesophageal reflux disease)      Avascular necrosis of bone (H)     Alcohol dependence, in remission (H)     Lumbar radicular pain     Foraminal stenosis of lumbar region     Stenosis of lateral recess of lumbar spine     Lumbar facet arthropathy     Myofascial pain     Class 2 severe obesity due to excess calories with serious comorbidity in adult (H)       Allergies  No Known Allergies    Medications  Current Outpatient Medications   Medication Sig Dispense Refill     Alcohol Swabs PADS Use to swab the area of the injection or quyen as directed Per insurance coverage 100 each 0     allopurinol (ZYLOPRIM) 100 MG tablet Take 100 mg by mouth daily       allopurinol (ZYLOPRIM) 300 MG tablet Take 300 mg by mouth daily 300 mg and 100 mg total dose 400 mg daily       anakinra (KINERET) 100 MG/0.67ML SOSY injection Inject 100 mg Subcutaneous twice a week       apixaban ANTICOAGULANT (ELIQUIS ANTICOAGULANT) 5 MG tablet Take 1 tablet (5 mg) by mouth 2 times daily 180 tablet 3     atorvastatin (LIPITOR) 40 MG tablet Take 1 tablet (40 mg) by mouth every evening 90 tablet 3     Blood Glucose Monitoring Suppl (ONETOUCH VERIO REFLECT) w/Device KIT  (Patient not taking: Reported on 6/25/2024)       capsaicin (ZOSTRIX) 0.025 % external cream Apply to feet two times per day 60 g 1     clotrimazole (LOTRIMIN) 1 % external cream Apply topically 2 times daily For feet 60 g 11     furosemide (LASIX) 20 MG tablet TAKE 1 TABLET(20 MG) BY MOUTH DAILY 90 tablet 3     gabapentin (NEURONTIN) 100 MG capsule Take 1 capsule (100 mg) by mouth every morning AND 1 capsule (100 mg) daily (with lunch) AND 2 capsules (200 mg) every evening. 100 mg in am, 100 mg in the afternoon and 200 mg at bedtime by mouth daily 360 capsule 3     levothyroxine (SYNTHROID/LEVOTHROID) 88 MCG tablet Take 1 tablet (88 mcg) by mouth daily Takes in the middle of the night daily 90 tablet 3     magnesium oxide (MAG-OX) 400 MG tablet Take 2 tablets (800 mg) by mouth 2 times daily 60 tablet 0      metoprolol tartrate (LOPRESSOR) 50 MG tablet Take 1 tablet (50 mg) by mouth 2 times daily for 90 days 180 tablet 3     multivitamin w/minerals (THERA-VIT-M) tablet Take 1 tablet by mouth daily 30 tablet 0     mupirocin (BACTROBAN) 2 % external ointment Apply topically 2 times daily 60 g 3     MYFORTIC (BRAND) 180 MG EC tablet Take 3 tablets (540 mg) by mouth 2 times daily 540 tablet 3     pantoprazole (PROTONIX) 20 MG EC tablet Take 1 tablet (20 mg) by mouth every morning (before breakfast) 90 tablet 3     predniSONE (DELTASONE) 5 MG tablet Take 1 tablet (5 mg) by mouth daily 90 tablet 3     Sharps Container MISC Use as directed to dispose of needles, lancets and other sharps 1 each 0     simethicone (MYLICON) 125 MG chewable tablet Take 125 mg by mouth 4 times daily as needed for intestinal gas       sulfamethoxazole-trimethoprim (BACTRIM) 400-80 MG tablet Take 1 tablet by mouth daily 90 tablet 3     tacrolimus (GENERIC EQUIVALENT) 0.5 MG capsule Take 1 capsule (0.5 mg) by mouth 2 times daily Total dose 1.5 mg BID (Patient not taking: Reported on 6/25/2024) 180 capsule 3     tacrolimus (GENERIC EQUIVALENT) 1 MG capsule Take 1 capsule (1 mg) by mouth 2 times daily Total dose: 1mg  capsule 3     tadalafil (CIALIS) 5 MG tablet Take 1 tablet (5 mg) by mouth daily as needed (take 1-2 tablets at least 30 minutes prior to sexual activity.) 20 tablet 5     triamcinolone (KENALOG) 0.1 % external ointment Apply topically 2 times daily 454 g 3     ursodiol (ACTIGALL) 250 MG tablet Take 1 tablet (250 mg) by mouth 2 times daily 180 tablet 3     No current facility-administered medications for this visit.     There are no discontinued medications.    Physical Exam  Vital Signs: BP (!) 147/97 (BP Location: Right arm, Patient Position: Sitting, Cuff Size: Adult Regular)   Pulse 70   Temp 97.9  F (36.6  C) (Oral)   Wt 101.6 kg (224 lb)   SpO2 98%   BMI 36.15 kg/m      GENERAL APPEARANCE: alert and no distress  EYES:  eyes grossly normal to inspection  HENT: normal cephalic/atraumatic  RESP: lungs clear to auscultation - no rales, rhonchi or wheezes  CV: regular rhythm, normal rate, no rub, no murmur  EDEMA: no LE edema bilaterally  ABDOMEN: soft, nondistended, nontender, bowel sounds normal  MS: extremities normal - no gross deformities noted, no evidence of inflammation in joints, no muscle tenderness  SKIN: no rash  NEURO: mentation intact and speech normal  PSYCH: mentation appears normal and affect normal/bright  TX KIDNEY: normal    Data        Latest Ref Rng & Units 6/25/2024     7:55 AM 5/30/2024     8:04 AM 4/29/2024     8:12 AM   Renal   Sodium 135 - 145 mmol/L 141  139  139    K 3.4 - 5.3 mmol/L 4.1  4.3  4.1    Cl 98 - 107 mmol/L 101  102  102    Cl (external) 98 - 107 mmol/L 101  102  102    CO2 22 - 29 mmol/L 28  26  26    Urea Nitrogen 8.0 - 23.0 mg/dL 18.7  16.3  16.9    Creatinine 0.67 - 1.17 mg/dL 1.10  0.95  1.04    Glucose 70 - 99 mg/dL 93  112  94    Calcium 8.8 - 10.2 mg/dL 10.0  9.2  9.4    Magnesium 1.7 - 2.3 mg/dL 1.7  1.8  1.8          Latest Ref Rng & Units 6/25/2024     7:55 AM 5/30/2024     8:04 AM 4/29/2024     8:12 AM   Bone Health   Phosphorus 2.5 - 4.5 mg/dL 3.7  3.5  3.6          Latest Ref Rng & Units 6/25/2024     7:55 AM 5/30/2024     8:04 AM 4/29/2024     8:12 AM   Heme   WBC 4.0 - 11.0 10e3/uL 5.5  6.1  7.0    Hgb 13.3 - 17.7 g/dL 14.8  15.0  14.8    Plt 150 - 450 10e3/uL 166  164  182          Latest Ref Rng & Units 6/25/2024     7:55 AM 5/30/2024     8:04 AM 4/29/2024     8:12 AM   Liver   AP 40 - 150 U/L 153  165  171    TBili <=1.2 mg/dL 0.6  0.6  0.5    Bilirubin Direct 0.00 - 0.30 mg/dL <0.20  <0.20  <0.20    ALT 0 - 70 U/L 25  26  22    AST 0 - 45 U/L 24  24  22    Tot Protein 6.4 - 8.3 g/dL 6.6  6.2  6.2    Albumin 3.5 - 5.2 g/dL 4.3  4.2  4.0          Latest Ref Rng & Units 12/28/2023    11:54 AM 9/20/2023     4:11 PM 6/7/2023     3:30 AM   Pancreas   A1C <5.7 % 6.4  5.2     Amylase  28 - 100 U/L   90    Lipase (Roche) 13 - 60 U/L   59          Latest Ref Rng & Units 4/29/2024     8:12 AM 3/25/2024     1:05 PM 2/2/2024     8:13 AM   Iron studies   Iron 61 - 157 ug/dL 58  41  50    Iron Sat Index 15 - 46 % 25  17  24    Ferritin 31 - 409 ng/mL 314  363  807          Latest Ref Rng & Units 11/21/2023     7:42 AM 10/27/2023     8:21 AM 10/24/2023     8:06 AM   [facility] Virology   LOG IU/ML OF CMVQNT  1.7      BK Quant Log Ext log IU/mL  Undetected  Undetected    BK Quant Result Ext Undetected IU/mL  Undetected  Undetected    BK Quant Spec Ext    Plasma      Failed to redirect to the Timeline version of the REVFS SmartLink.  Recent Labs   Lab Test 04/05/24  0801 04/29/24  0812 05/30/24  0804   DOSTAC 4/4/2024 4/28/2024 5/29/2024   TACROL 7.5 6.8 7.8     Recent Labs   Lab Test 12/28/23  1154 02/02/24  0813 03/25/24  1305 04/29/24  0812   DOSMPA 12/27/2023   9:00 PM 2/1/2024   8:00 PM  --   --    MPACID <0.25* 1.35 1.76 1.51   MPAG 60.9 62.7 41.9 51.1     Prescription drug management  45 minutes spent by me on the date of the encounter doing chart review, history and exam, documentation and further activities per the note      Again, thank you for allowing me to participate in the care of your patient.        Sincerely,        Marbella Marrero MD

## 2024-06-25 NOTE — NURSING NOTE
Chief Complaint   Patient presents with    RECHECK     K/P POST RETURN TXP NEPH - 12 month post kidney transplant       Vitals:    06/25/24 1011 06/25/24 1014   BP: (!) 148/90 (!) 147/97   BP Location: Left arm Right arm   Patient Position: Sitting Sitting   Cuff Size: Adult Regular Adult Regular   Pulse: 70    Temp: 97.9  F (36.6  C)    TempSrc: Oral    SpO2: 98%    Weight: 101.6 kg (224 lb)        BP Readings from Last 3 Encounters:   06/25/24 (!) 147/97   06/25/24 (!) 132/91   06/19/24 137/80       BP (!) 147/97 (BP Location: Right arm, Patient Position: Sitting, Cuff Size: Adult Regular)   Pulse 70   Temp 97.9  F (36.6  C) (Oral)   Wt 101.6 kg (224 lb)   SpO2 98%   BMI 36.15 kg/m       Truong Merida, [facility]

## 2024-06-26 ENCOUNTER — TELEPHONE (OUTPATIENT)
Dept: GASTROENTEROLOGY | Facility: CLINIC | Age: 69
End: 2024-06-26
Payer: MEDICARE

## 2024-06-27 LAB
ALP BONE SERPL-CCNC: 72 U/L
ALP LIVER SERPL-CCNC: 88 U/L
ALP OTHER SERPL-CCNC: 0 U/L
ALP SERPL-CCNC: 160 U/L
LABORATORY REPORT: NORMAL
MYCOPHENOLATE SERPL LC/MS/MS-MCNC: 1.31 MG/L (ref 1–3.5)
MYCOPHENOLATE-G SERPL LC/MS/MS-MCNC: 52.8 MG/L (ref 30–95)
PETH INTERPRETATION: NORMAL
PLPETH BLD-MCNC: <10 NG/ML
POPETH BLD-MCNC: <10 NG/ML
TME LAST DOSE: NORMAL H
TME LAST DOSE: NORMAL H

## 2024-07-05 ENCOUNTER — TELEPHONE (OUTPATIENT)
Dept: TRANSPLANT | Facility: CLINIC | Age: 69
End: 2024-07-05
Payer: MEDICARE

## 2024-07-05 NOTE — LETTER
OUTPATIENT LABORATORY TEST ORDER   Patient Name: Damion Quinones   YOB: 1955     [facility] MR# [if applicable]: [id number]   Date & Time: July 5, 2024  10:19 AM  Expiration Date: 1 year after date issued    Diagnosis: Kidney Transplant (ICD-10 Z94.0)    Liver Transplant (ICD-10 Z94.4)    Aftercare following organ transplant (ICD-10 Z48.288)    Long term use of medications (ICD-10 Z79.899)    We ask your assistance in obtaining the following laboratory tests, which are part of our routine surveillance program for Solid Organ Transplant patients.     Please fax each result to [phone], same day as resulted/available    Critical lab results page [phone]    Monthly  CBC with platelets  Basic Metabolic Panel (Sodium, Potassium, Chloride, CO2, Creatinine, Urea Nitrogen, glucose, Calcium)  Hepatic Panel  Tacrolimus/Prograf/ drug level     BK Virus by PCR, Quantitative    At 13 months post-transplant (7/2024)  PRA/DSA level (mailers provided by the patient)    At 15 months post-transplant  (9/2024)  T Cell Subset (CD4)    At 18 months post-transplant  PRA / DSA (mailers provided by the patient)             Every 6 Months    Urine for protein/creatinine                  If you have any questions, please call [facility]- [phone] or [phone], Fax- [phone].      .  Marbella Marrero MD

## 2024-07-05 NOTE — TELEPHONE ENCOUNTER
One year post kidney /liver transplant  lab    Task     Please update kidney lab  letter send to patient  and epic orders

## 2024-07-09 ENCOUNTER — RADIOLOGY INJECTION OFFICE VISIT (OUTPATIENT)
Dept: PHYSICAL MEDICINE AND REHAB | Facility: CLINIC | Age: 69
End: 2024-07-09
Attending: PHYSICAL MEDICINE & REHABILITATION
Payer: COMMERCIAL

## 2024-07-09 VITALS
WEIGHT: 224 LBS | RESPIRATION RATE: 18 BRPM | HEART RATE: 90 BPM | BODY MASS INDEX: 35.16 KG/M2 | DIASTOLIC BLOOD PRESSURE: 68 MMHG | HEIGHT: 67 IN | TEMPERATURE: 98.5 F | SYSTOLIC BLOOD PRESSURE: 116 MMHG | OXYGEN SATURATION: 98 %

## 2024-07-09 DIAGNOSIS — M47.816 LUMBAR FACET ARTHROPATHY: ICD-10-CM

## 2024-07-09 PROCEDURE — 64493 INJ PARAVERT F JNT L/S 1 LEV: CPT | Mod: 50 | Performed by: STUDENT IN AN ORGANIZED HEALTH CARE EDUCATION/TRAINING PROGRAM

## 2024-07-09 RX ORDER — BUPIVACAINE HYDROCHLORIDE 5 MG/ML
INJECTION, SOLUTION EPIDURAL; INTRACAUDAL
Status: COMPLETED | OUTPATIENT
Start: 2024-07-09 | End: 2024-07-09

## 2024-07-09 RX ORDER — LIDOCAINE HYDROCHLORIDE 10 MG/ML
INJECTION, SOLUTION EPIDURAL; INFILTRATION; INTRACAUDAL; PERINEURAL
Status: COMPLETED | OUTPATIENT
Start: 2024-07-09 | End: 2024-07-09

## 2024-07-09 RX ADMIN — LIDOCAINE HYDROCHLORIDE 4 ML: 10 INJECTION, SOLUTION EPIDURAL; INFILTRATION; INTRACAUDAL; PERINEURAL at 08:25

## 2024-07-09 RX ADMIN — BUPIVACAINE HYDROCHLORIDE 2 ML: 5 INJECTION, SOLUTION EPIDURAL; INTRACAUDAL at 08:26

## 2024-07-09 ASSESSMENT — PAIN SCALES - GENERAL
PAINLEVEL: SEVERE PAIN (7)
PAINLEVEL: MODERATE PAIN (4)

## 2024-07-09 NOTE — PATIENT INSTRUCTIONS
DISCHARGE INSTRUCTIONS    During office hours (8:00 a.m.- 4:00 p.m.) questions or concerns may be answered  by calling [facility] Navigation Nurses at  [phone].  Messages received after hours will be returned the following business day.      In the case of an emergency, please dial 911 or seek assistance at the nearest Emergency Room/Urgent Care facility.     All Patients:  You may experience an increase in your symptoms for the first 2 days, once the numbing medication wears off.    You may resume your regular medication, no pain medication until you have completed your diary    You may shower. No swimming, tub bath or hot tub for 24 hours    Continue your activities that can cause you pain to test the blocks.                         You should not drive for the next 1 to 3 hours (have someone drive you)           POSSIBLE PROCEDURE SIDE EFFECTS  -Call Spine Center if concerned-    Increased Pain  Increased numbness/tingling     Nausea/Vomiting  Bruising/bleeding at site (hematoma)             Swelling at site (edema) Headache  Difficulty walking  Infection        Fever greater than 100.5      Please complete your pain diary and return the diary to the Spine Center at your earliest convenience.  The [facility] Center will contact you to schedule your next appointment after your pain diary is reviewed by your doctor.  Thank you.

## 2024-07-17 ENCOUNTER — TELEPHONE (OUTPATIENT)
Dept: PHYSICAL MEDICINE AND REHAB | Facility: CLINIC | Age: 69
End: 2024-07-17
Payer: MEDICARE

## 2024-07-17 DIAGNOSIS — M47.816 SPONDYLOSIS OF LUMBAR REGION WITHOUT MYELOPATHY OR RADICULOPATHY: Primary | ICD-10-CM

## 2024-07-17 NOTE — TELEPHONE ENCOUNTER
Called and informed patient of approval to move forward with 2 level MBBs. Patient agreed with this plan. Informed him that I will place order and  will reach out to schedule. Injection requirements reviewed.    ----- Message from William Cotton sent at 7/15/2024  7:14 AM CDT -----  Regarding: RE: MBB/RFA PROCESS  Tc Martínez,    I checked the patient's medical policy and he can have up to 6 MBB per year.    Thanks!    William Senior    ----- Message -----  From: Breyer, Nathan J, RN  Sent: 7/12/2024   8:10 AM CDT  To: [facility] Procedure Support Pool; #  Subject: FW: MBB/RFA PROCESS                              PA Team-    Is there any way for us to check to see if this patient's insurance plan would limit him to only 2 MBBs in a year?  Dr. Mejia has concerns that if we were to add a level and pursue a B/L L3, L4, L5 RF, that the RF PA request would be denied on account of it being 3 sets of MBBs (1 for the B/L L4, L5 MBBs that were already done and, potentially, 2 for the B/L L3, L4, L5 MBBs as workup for the B/L L3-5 RF).  If there isn't a restriction, then we can move forward with offering the patient the additional level to try to improve on the amount of pain relief with the blocks.    Appreciate your help in exploring this.    Let us know.      Mauricio  ----- Message -----  From: Abdulaziz Mejia DO  Sent: 7/10/2024  11:37 AM CDT  To: Nathan J Breyer, RN; #  Subject: RE: MBB/RFA PROCESS                              I reviewed the patient's pain diary.  He had very good results from the medial branch blocks however they were borderline.  I would like to proceed with bilateral L3, 4, 5 medial branch blocks.  I would like to evaluate for any additional relief when blocking the L4-5 along with the L5-S1 facets.  Please see if we can get this approved through insurance and if the patient is willing to add an additional level to the injections.    ----- Message -----  From: Breyer, Nathan J, LORIN  Sent:  7/10/2024   8:20 AM CDT  To: Abdulaziz Mejia DO; #  Subject: FW: MBB/RFA PROCESS                              Pain diary received and available for review in chart.  Please forward recommendation to the Procedure Support Pool in-basket.  Thanks!    ----- Message -----  From: Shanique Macedo RN  Sent: 7/9/2024   8:23 AM CDT  To: [facility] Procedure Support Pool  Subject: MBB/RFA PROCESS                                  Patient here today, 7/9/2024 for initial bilateral L4, L5 MBBs with Dr. Carlson as ordered by Dr. Mejia with eye to RFA. Awaiting pain diary at this time.

## 2024-07-23 ENCOUNTER — TELEPHONE (OUTPATIENT)
Dept: PHARMACY | Facility: CLINIC | Age: 69
End: 2024-07-23

## 2024-07-23 NOTE — TELEPHONE ENCOUNTER
Clinical Pharmacy Consult:                                                      Transplant Specific: 12 month post transplant pharmacy review  Date of Transplant: 06/06/2023  Type of Transplant: kidney and liver  First Transplant: yes  History of rejection: no    Immunosuppression Regimen   TAC 1 mg qAM & 1 mg qPM, Prednisone 5 mg daily, and Myforitic 540 mg qAM & 540 mg qPM  Patient specific goal: 4-6  Most recent level: 6.6 on 6/25/24  Immunosuppressant Levels:  Therapeutic - near goal, no dose change  Pt adherent to lab draws: yes  Scr:   Lab Results   Component Value Date    CR 1.08 07/26/2023    CR 1.11 08/27/2019     Side effects: none    Prophylactic Medications  Antibacterial:  Bactrim 400-800 daily  Scheduled Discontinue Date: Lifelong    Antifungal: Nystatin  Scheduled Discontinue Date: Therapy Complete    Antiviral: Max dose in Liver transplant is Valcyte 450mg once daily   Scheduled Discontinue Date: 3 months - completed    Acid Reducer: Protonix (pantoprazole)  Scheduled Reviewed Date:  MD to evaluate    Thrombosis Prevention: Apixiban 5 mg twice daily  Scheduled Discontinue Date:  MD to evaluate    Blood Pressure Management  Frequency of home Blood Pressure checks:  three times a week  Most recent home BP:  107/72  Patient Blood pressure goal: <140/90  Patient blood pressure at goal:  yes  Hospitalizations/ER visits since last assessment: 0    Med rec/DUR performed: yes  Med Rec Discrepancies: yes - pt taking pantoprazole every other day    Spoke with Casey and Apoorva today via phone. Casey is doing great. No side effects reported. Apoorva manages all of Casey's medications. She fills a weekly pill box each Sunday. Reported no missed doses and no problems getting medication refills.     Casey checks his blood pressure at home three times a week. His readings at home have been at goal. Last tacro level was near goal and no dose changes were made.     No other questions or concerns. Follow up in 1 year.    Yuliya  Hannah Pharm D  [facility]

## 2024-07-24 ENCOUNTER — HOSPITAL ENCOUNTER (OUTPATIENT)
Dept: CT IMAGING | Facility: CLINIC | Age: 69
Discharge: HOME | End: 2024-07-24
Attending: INTERNAL MEDICINE | Admitting: INTERNAL MEDICINE
Payer: COMMERCIAL

## 2024-07-24 DIAGNOSIS — Z94.4 LIVER TRANSPLANT RECIPIENT (H): ICD-10-CM

## 2024-07-24 DIAGNOSIS — C22.0 HCC (HEPATOCELLULAR CARCINOMA) (H): ICD-10-CM

## 2024-07-24 PROCEDURE — 250N000011 HC RX IP 250 OP 636: Performed by: INTERNAL MEDICINE

## 2024-07-24 PROCEDURE — 71260 CT THORAX DX C+: CPT

## 2024-07-24 RX ORDER — IOPAMIDOL 755 MG/ML
90 INJECTION, SOLUTION INTRAVASCULAR ONCE
Status: COMPLETED | OUTPATIENT
Start: 2024-07-24 | End: 2024-07-24

## 2024-07-24 RX ADMIN — IOPAMIDOL 90 ML: 755 INJECTION, SOLUTION INTRAVENOUS at 15:35

## 2024-07-28 ENCOUNTER — MYC REFILL (OUTPATIENT)
Dept: GASTROENTEROLOGY | Facility: CLINIC | Age: 69
End: 2024-07-28
Payer: MEDICARE

## 2024-07-28 DIAGNOSIS — G62.9 NEUROPATHY: ICD-10-CM

## 2024-07-28 DIAGNOSIS — Z94.4 LIVER TRANSPLANT RECIPIENT (H): ICD-10-CM

## 2024-07-29 RX ORDER — PANTOPRAZOLE SODIUM 20 MG/1
20 TABLET, DELAYED RELEASE ORAL EVERY OTHER DAY
Qty: 45 TABLET | Refills: 3 | Status: SHIPPED | OUTPATIENT
Start: 2024-07-29

## 2024-07-29 RX ORDER — GABAPENTIN 100 MG/1
CAPSULE ORAL
Qty: 150 CAPSULE | Refills: 11 | Status: SHIPPED | OUTPATIENT
Start: 2024-07-29

## 2024-07-30 ENCOUNTER — TELEPHONE (OUTPATIENT)
Dept: TRANSPLANT | Facility: CLINIC | Age: 69
End: 2024-07-30

## 2024-07-30 ENCOUNTER — LAB (OUTPATIENT)
Dept: LAB | Facility: CLINIC | Age: 69
End: 2024-07-30
Payer: COMMERCIAL

## 2024-07-30 DIAGNOSIS — Z94.0 HISTORY OF KIDNEY TRANSPLANT: ICD-10-CM

## 2024-07-30 DIAGNOSIS — Z94.0 KIDNEY REPLACED BY TRANSPLANT: ICD-10-CM

## 2024-07-30 DIAGNOSIS — I77.82 ANCA-ASSOCIATED VASCULITIS (H): ICD-10-CM

## 2024-07-30 DIAGNOSIS — Z79.899 ENCOUNTER FOR LONG-TERM (CURRENT) USE OF MEDICATIONS: ICD-10-CM

## 2024-07-30 DIAGNOSIS — N39.0 URINARY TRACT INFECTION: Primary | ICD-10-CM

## 2024-07-30 DIAGNOSIS — Z48.288 ENCOUNTER FOR AFTERCARE FOLLOWING MULTIPLE ORGAN TRANSPLANT: ICD-10-CM

## 2024-07-30 DIAGNOSIS — M1A.9XX1 TOPHACEOUS GOUT: ICD-10-CM

## 2024-07-30 DIAGNOSIS — N18.2 ANEMIA IN STAGE 2 CHRONIC KIDNEY DISEASE: Primary | ICD-10-CM

## 2024-07-30 DIAGNOSIS — L40.50 PSORIATIC ARTHRITIS (H): ICD-10-CM

## 2024-07-30 DIAGNOSIS — Z94.4 LIVER REPLACED BY TRANSPLANT (H): ICD-10-CM

## 2024-07-30 DIAGNOSIS — D63.1 ANEMIA IN STAGE 2 CHRONIC KIDNEY DISEASE: Primary | ICD-10-CM

## 2024-07-30 DIAGNOSIS — Z94.4 HISTORY OF LIVER TRANSPLANT (H): ICD-10-CM

## 2024-07-30 DIAGNOSIS — Z94.0 KIDNEY REPLACED BY TRANSPLANT: Primary | ICD-10-CM

## 2024-07-30 DIAGNOSIS — Z79.899 ENCOUNTER FOR LONG-TERM CURRENT USE OF MEDICATION: ICD-10-CM

## 2024-07-30 DIAGNOSIS — Z48.298 AFTERCARE FOLLOWING ORGAN TRANSPLANT: ICD-10-CM

## 2024-07-30 DIAGNOSIS — N39.0 URINARY TRACT INFECTION: ICD-10-CM

## 2024-07-30 LAB
ALBUMIN MFR UR ELPH: 19.2 MG/DL
ALBUMIN SERPL BCG-MCNC: 4.1 G/DL (ref 3.5–5.2)
ALBUMIN UR-MCNC: ABNORMAL MG/DL
ALP SERPL-CCNC: 155 U/L (ref 40–150)
ALT SERPL W P-5'-P-CCNC: 21 U/L (ref 0–70)
ANION GAP SERPL CALCULATED.3IONS-SCNC: 11 MMOL/L (ref 7–15)
APPEARANCE UR: CLEAR
AST SERPL W P-5'-P-CCNC: 21 U/L (ref 0–45)
BACTERIA #/AREA URNS HPF: ABNORMAL /HPF
BASOPHILS # BLD AUTO: 0 10E3/UL (ref 0–0.2)
BASOPHILS NFR BLD AUTO: 0 %
BILIRUB DIRECT SERPL-MCNC: 0.24 MG/DL (ref 0–0.3)
BILIRUB SERPL-MCNC: 0.8 MG/DL
BILIRUB UR QL STRIP: NEGATIVE
BUN SERPL-MCNC: 15.2 MG/DL (ref 8–23)
CALCIUM SERPL-MCNC: 9.2 MG/DL (ref 8.8–10.4)
CHLORIDE SERPL-SCNC: 100 MMOL/L (ref 98–107)
COLOR UR AUTO: YELLOW
CREAT SERPL-MCNC: 1.01 MG/DL (ref 0.67–1.17)
CREAT UR-MCNC: 148 MG/DL
CREAT UR-MCNC: 148 MG/DL
EGFRCR SERPLBLD CKD-EPI 2021: 82 ML/MIN/1.73M2
EOSINOPHIL # BLD AUTO: 0.2 10E3/UL (ref 0–0.7)
EOSINOPHIL NFR BLD AUTO: 2 %
ERYTHROCYTE [DISTWIDTH] IN BLOOD BY AUTOMATED COUNT: 14.9 % (ref 10–15)
GLUCOSE SERPL-MCNC: 87 MG/DL (ref 70–99)
GLUCOSE UR STRIP-MCNC: NEGATIVE MG/DL
HBA1C MFR BLD: 5.5 % (ref 0–5.6)
HCO3 SERPL-SCNC: 28 MMOL/L (ref 22–29)
HCT VFR BLD AUTO: 47.5 % (ref 40–53)
HGB BLD-MCNC: 15 G/DL (ref 13.3–17.7)
HGB UR QL STRIP: ABNORMAL
HOLD SPECIMEN: NORMAL
IMM GRANULOCYTES # BLD: 0.1 10E3/UL
IMM GRANULOCYTES NFR BLD: 1 %
KETONES UR STRIP-MCNC: NEGATIVE MG/DL
LEUKOCYTE ESTERASE UR QL STRIP: ABNORMAL
LYMPHOCYTES # BLD AUTO: 1 10E3/UL (ref 0.8–5.3)
LYMPHOCYTES NFR BLD AUTO: 15 %
MCH RBC QN AUTO: 29.6 PG (ref 26.5–33)
MCHC RBC AUTO-ENTMCNC: 31.6 G/DL (ref 31.5–36.5)
MCV RBC AUTO: 94 FL (ref 78–100)
MICROALBUMIN UR-MCNC: 28.5 MG/L
MICROALBUMIN/CREAT UR: 19.26 MG/G CR (ref 0–17)
MONOCYTES # BLD AUTO: 0.6 10E3/UL (ref 0–1.3)
MONOCYTES NFR BLD AUTO: 9 %
NEUTROPHILS # BLD AUTO: 5 10E3/UL (ref 1.6–8.3)
NEUTROPHILS NFR BLD AUTO: 73 %
NITRATE UR QL: NEGATIVE
PH UR STRIP: 7.5 [PH] (ref 5–7)
PLATELET # BLD AUTO: 158 10E3/UL (ref 150–450)
POTASSIUM SERPL-SCNC: 4.1 MMOL/L (ref 3.4–5.3)
PROT SERPL-MCNC: 6.4 G/DL (ref 6.4–8.3)
PROT/CREAT 24H UR: 0.13 MG/MG CR (ref 0–0.2)
RBC # BLD AUTO: 5.06 10E6/UL (ref 4.4–5.9)
RBC #/AREA URNS AUTO: ABNORMAL /HPF
SODIUM SERPL-SCNC: 139 MMOL/L (ref 135–145)
SP GR UR STRIP: 1.02 (ref 1–1.03)
SQUAMOUS #/AREA URNS AUTO: ABNORMAL /LPF
TACROLIMUS BLD-MCNC: 8.4 UG/L (ref 5–15)
TME LAST DOSE: NORMAL H
TME LAST DOSE: NORMAL H
URATE SERPL-MCNC: 4.5 MG/DL (ref 3.4–7)
UROBILINOGEN UR STRIP-ACNC: 0.2 E.U./DL
VIT D+METAB SERPL-MCNC: 39 NG/ML (ref 20–50)
WBC # BLD AUTO: 6.8 10E3/UL (ref 4–11)
WBC #/AREA URNS AUTO: ABNORMAL /HPF

## 2024-07-30 PROCEDURE — 87799 DETECT AGENT NOS DNA QUANT: CPT

## 2024-07-30 PROCEDURE — 84550 ASSAY OF BLOOD/URIC ACID: CPT

## 2024-07-30 PROCEDURE — 87086 URINE CULTURE/COLONY COUNT: CPT

## 2024-07-30 PROCEDURE — 81001 URINALYSIS AUTO W/SCOPE: CPT

## 2024-07-30 PROCEDURE — 82248 BILIRUBIN DIRECT: CPT

## 2024-07-30 PROCEDURE — 83876 ASSAY MYELOPEROXIDASE: CPT

## 2024-07-30 PROCEDURE — 83036 HEMOGLOBIN GLYCOSYLATED A1C: CPT

## 2024-07-30 PROCEDURE — 80053 COMPREHEN METABOLIC PANEL: CPT

## 2024-07-30 PROCEDURE — 36415 COLL VENOUS BLD VENIPUNCTURE: CPT

## 2024-07-30 PROCEDURE — 86833 HLA CLASS II HIGH DEFIN QUAL: CPT

## 2024-07-30 PROCEDURE — 80197 ASSAY OF TACROLIMUS: CPT

## 2024-07-30 PROCEDURE — 82306 VITAMIN D 25 HYDROXY: CPT

## 2024-07-30 PROCEDURE — 85025 COMPLETE CBC W/AUTO DIFF WBC: CPT | Mod: QW

## 2024-07-30 PROCEDURE — 82570 ASSAY OF URINE CREATININE: CPT

## 2024-07-30 PROCEDURE — 86832 HLA CLASS I HIGH DEFIN QUAL: CPT

## 2024-07-30 PROCEDURE — 82043 UR ALBUMIN QUANTITATIVE: CPT

## 2024-07-30 PROCEDURE — 84156 ASSAY OF PROTEIN URINE: CPT

## 2024-07-30 PROCEDURE — 83516 IMMUNOASSAY NONANTIBODY: CPT

## 2024-07-30 NOTE — TELEPHONE ENCOUNTER
Message from Dr. Poole:    Jose Poole MD sent to Apoorva Zaman RN  Check a urine culture.

## 2024-07-30 NOTE — TELEPHONE ENCOUNTER
Spoke to pt and let him know that he orders that are in the chart are current and correct. Suggested that if staff has questions about orders, to have them reach out to us.

## 2024-07-31 ENCOUNTER — TELEPHONE (OUTPATIENT)
Dept: TRANSPLANT | Facility: CLINIC | Age: 69
End: 2024-07-31

## 2024-07-31 DIAGNOSIS — Z94.0 KIDNEY REPLACED BY TRANSPLANT: Primary | ICD-10-CM

## 2024-07-31 DIAGNOSIS — Z94.4 LIVER REPLACED BY TRANSPLANT (H): ICD-10-CM

## 2024-07-31 LAB — BACTERIA UR CULT: NORMAL

## 2024-07-31 RX ORDER — TACROLIMUS 1 MG/1
1 CAPSULE ORAL PRN
Qty: 180 CAPSULE | Refills: 3 | Status: SHIPPED | OUTPATIENT
Start: 2024-07-31 | End: 2024-08-20 | Stop reason: DRUGHIGH

## 2024-07-31 RX ORDER — TACROLIMUS 0.5 MG/1
0.5 CAPSULE ORAL 2 TIMES DAILY
Qty: 180 CAPSULE | Refills: 3 | Status: SHIPPED | OUTPATIENT
Start: 2024-07-31

## 2024-07-31 NOTE — TELEPHONE ENCOUNTER
ISSUE:   Tacrolimus IR level 8.4 on 7/30, goal 4-6, dose 1 mg BID.    PLAN:   Call Patient and confirm this was an accurate 12-hour trough.   Verify Tacrolimus IR dose 1 mg BID.   Confirm no new medications or or missed doses.   Confirm no new illness / infection / diarrhea.   If accurate trough and accurate dose, decrease Tacrolimus IR dose to 0.5 mg BID     Is this more than a 50% increase or decrease in current IS dose: Yes  If YES, justification: level above goal on low dose    Repeat labs in 1 weeks.  *If > 50% change in immunosuppression dose, repeat labs in 1 week.     OUTCOME:   Spoke with Patient, they confirm accurate trough level and current dose 1 mg BID.   Patient confirmed dose change to 0.5 mg BID.  Patient agreed to repeat labs in 1 weeks.   Orders sent to preferred pharmacy for dose change and lab for repeat labs.   Patient voiced understanding of plan.

## 2024-08-01 LAB
BK VIRUS SPECIMEN TYPE: NORMAL
BKV DNA # SPEC NAA+PROBE: NOT DETECTED IU/ML

## 2024-08-02 LAB
DONOR IDENTIFICATION: NORMAL
DSA COMMENTS: NORMAL
DSA PRESENT: NO
DSA TEST METHOD: NORMAL
MYELOPEROXIDASE AB SER IA-ACNC: 0.3 U/ML
MYELOPEROXIDASE AB SER IA-ACNC: NEGATIVE
ORGAN: NORMAL
PROTEINASE3 AB SER IA-ACNC: <1 U/ML
PROTEINASE3 AB SER IA-ACNC: NEGATIVE
SA 1  COMMENTS: NORMAL
SA 1 CELL: NORMAL
SA 1 TEST METHOD: NORMAL
SA 2 CELL: NORMAL
SA 2 COMMENTS: NORMAL
SA 2 TEST METHOD: NORMAL
SA1 HI RISK ABY: NORMAL
SA1 MOD RISK ABY: NORMAL
SA2 HI RISK ABY: NORMAL
SA2 MOD RISK ABY: NORMAL
UNACCEPTABLE ANTIGENS: NORMAL

## 2024-08-09 ENCOUNTER — LAB (OUTPATIENT)
Dept: LAB | Facility: CLINIC | Age: 69
End: 2024-08-09
Payer: COMMERCIAL

## 2024-08-09 DIAGNOSIS — Z94.0 KIDNEY REPLACED BY TRANSPLANT: ICD-10-CM

## 2024-08-09 DIAGNOSIS — Z94.4 LIVER REPLACED BY TRANSPLANT (H): ICD-10-CM

## 2024-08-09 LAB
TACROLIMUS BLD-MCNC: 5.1 UG/L (ref 5–15)
TME LAST DOSE: NORMAL H
TME LAST DOSE: NORMAL H

## 2024-08-09 PROCEDURE — 36415 COLL VENOUS BLD VENIPUNCTURE: CPT

## 2024-08-09 PROCEDURE — 80197 ASSAY OF TACROLIMUS: CPT

## 2024-08-14 DIAGNOSIS — Z94.4 LIVER TRANSPLANT RECIPIENT (H): ICD-10-CM

## 2024-08-14 RX ORDER — SULFAMETHOXAZOLE AND TRIMETHOPRIM 400; 80 MG/1; MG/1
1 TABLET ORAL DAILY
Qty: 90 TABLET | Refills: 3 | Status: SHIPPED | OUTPATIENT
Start: 2024-08-14 | End: 2024-10-03

## 2024-08-16 DIAGNOSIS — I25.118 CORONARY ARTERY DISEASE OF NATIVE ARTERY OF NATIVE HEART WITH STABLE ANGINA PECTORIS (H): ICD-10-CM

## 2024-08-16 RX ORDER — ATORVASTATIN CALCIUM 40 MG/1
40 TABLET, FILM COATED ORAL EVERY EVENING
Qty: 90 TABLET | Refills: 0 | Status: SHIPPED | OUTPATIENT
Start: 2024-08-16 | End: 2024-08-20

## 2024-08-20 ENCOUNTER — VIRTUAL VISIT (OUTPATIENT)
Dept: CARDIOLOGY | Facility: CLINIC | Age: 69
End: 2024-08-20
Payer: COMMERCIAL

## 2024-08-20 ENCOUNTER — RADIOLOGY INJECTION OFFICE VISIT (OUTPATIENT)
Dept: PHYSICAL MEDICINE AND REHAB | Facility: CLINIC | Age: 69
End: 2024-08-20
Attending: PHYSICAL MEDICINE & REHABILITATION
Payer: COMMERCIAL

## 2024-08-20 VITALS
WEIGHT: 224 LBS | HEART RATE: 72 BPM | TEMPERATURE: 97.9 F | RESPIRATION RATE: 16 BRPM | OXYGEN SATURATION: 99 % | SYSTOLIC BLOOD PRESSURE: 124 MMHG | HEIGHT: 68 IN | BODY MASS INDEX: 33.95 KG/M2 | DIASTOLIC BLOOD PRESSURE: 64 MMHG

## 2024-08-20 DIAGNOSIS — K70.31 ALCOHOLIC CIRRHOSIS OF LIVER WITH ASCITES (H): ICD-10-CM

## 2024-08-20 DIAGNOSIS — N52.1 ERECTILE DYSFUNCTION DUE TO DISEASES CLASSIFIED ELSEWHERE: ICD-10-CM

## 2024-08-20 DIAGNOSIS — E66.01 CLASS 2 SEVERE OBESITY DUE TO EXCESS CALORIES WITH SERIOUS COMORBIDITY AND BODY MASS INDEX (BMI) OF 37.0 TO 37.9 IN ADULT (H): ICD-10-CM

## 2024-08-20 DIAGNOSIS — I48.21 PERMANENT ATRIAL FIBRILLATION WITH RAPID VENTRICULAR RESPONSE (H): ICD-10-CM

## 2024-08-20 DIAGNOSIS — Z94.4 LIVER TRANSPLANT RECIPIENT (H): ICD-10-CM

## 2024-08-20 DIAGNOSIS — I48.21 PERMANENT ATRIAL FIBRILLATION (H): ICD-10-CM

## 2024-08-20 DIAGNOSIS — E66.812 CLASS 2 SEVERE OBESITY DUE TO EXCESS CALORIES WITH SERIOUS COMORBIDITY AND BODY MASS INDEX (BMI) OF 37.0 TO 37.9 IN ADULT (H): ICD-10-CM

## 2024-08-20 DIAGNOSIS — I25.118 CORONARY ARTERY DISEASE OF NATIVE ARTERY OF NATIVE HEART WITH STABLE ANGINA PECTORIS (H): ICD-10-CM

## 2024-08-20 DIAGNOSIS — Z94.4 LIVER REPLACED BY TRANSPLANT (H): ICD-10-CM

## 2024-08-20 DIAGNOSIS — Z94.0 S/P KIDNEY TRANSPLANT: ICD-10-CM

## 2024-08-20 DIAGNOSIS — M47.816 SPONDYLOSIS OF LUMBAR REGION WITHOUT MYELOPATHY OR RADICULOPATHY: ICD-10-CM

## 2024-08-20 PROCEDURE — 99214 OFFICE O/P EST MOD 30 MIN: CPT | Mod: 95 | Performed by: INTERNAL MEDICINE

## 2024-08-20 PROCEDURE — 64494 INJ PARAVERT F JNT L/S 2 LEV: CPT | Mod: 50 | Performed by: STUDENT IN AN ORGANIZED HEALTH CARE EDUCATION/TRAINING PROGRAM

## 2024-08-20 PROCEDURE — 64493 INJ PARAVERT F JNT L/S 1 LEV: CPT | Mod: 50 | Performed by: STUDENT IN AN ORGANIZED HEALTH CARE EDUCATION/TRAINING PROGRAM

## 2024-08-20 RX ORDER — METOPROLOL TARTRATE 50 MG
50 TABLET ORAL 2 TIMES DAILY
Qty: 180 TABLET | Refills: 3 | Status: SHIPPED | OUTPATIENT
Start: 2024-08-20

## 2024-08-20 RX ORDER — ATORVASTATIN CALCIUM 40 MG/1
40 TABLET, FILM COATED ORAL EVERY EVENING
Qty: 90 TABLET | Refills: 3 | Status: SHIPPED | OUTPATIENT
Start: 2024-08-20

## 2024-08-20 RX ORDER — BUPIVACAINE HYDROCHLORIDE 5 MG/ML
INJECTION, SOLUTION EPIDURAL; INTRACAUDAL
Status: COMPLETED | OUTPATIENT
Start: 2024-08-20 | End: 2024-08-20

## 2024-08-20 RX ORDER — LIDOCAINE HYDROCHLORIDE 10 MG/ML
INJECTION, SOLUTION EPIDURAL; INFILTRATION; INTRACAUDAL; PERINEURAL
Status: COMPLETED | OUTPATIENT
Start: 2024-08-20 | End: 2024-08-20

## 2024-08-20 RX ADMIN — BUPIVACAINE HYDROCHLORIDE 3 ML: 5 INJECTION, SOLUTION EPIDURAL; INTRACAUDAL at 08:08

## 2024-08-20 RX ADMIN — LIDOCAINE HYDROCHLORIDE 5 ML: 10 INJECTION, SOLUTION EPIDURAL; INFILTRATION; INTRACAUDAL; PERINEURAL at 08:08

## 2024-08-20 ASSESSMENT — PAIN SCALES - GENERAL
PAINLEVEL: MODERATE PAIN (4)
PAINLEVEL: EXTREME PAIN (8)

## 2024-08-20 NOTE — PATIENT INSTRUCTIONS
DISCHARGE INSTRUCTIONS    During office hours (8:00 a.m.- 4:00 p.m.) questions or concerns may be answered  by calling [facility] Navigation Nurses at  [phone].  Messages received after hours will be returned the following business day.      In the case of an emergency, please dial 911 or seek assistance at the nearest Emergency Room/Urgent Care facility.     All Patients:  You may experience an increase in your symptoms for the first 2 days, once the numbing medication wears off.    You may resume your regular medication, no pain medication until you have completed your diary    You may shower. No swimming, tub bath or hot tub for 24 hours    Continue your activities that can cause you pain to test the blocks.                         You should not drive for the next 1 to 3 hours (have someone drive you)           POSSIBLE PROCEDURE SIDE EFFECTS  -Call [facility] if concerned-    Increased Pain  Increased numbness/tingling     Nausea/Vomiting  Bruising/bleeding at site (hematoma)             Swelling at site (edema) Headache  Difficulty walking  Infection        Fever greater than 100.5      Please complete your pain diary and return the diary to the [facility] at your earliest convenience.  The [facility] will contact you once your pain diary has been received and reviewed by your provider.  Thank you.    Medial Branch Block (MBB) TEST    This is a TEST injection to find out which nerves are causing your pain.  These diagnostic injections will NOT provide any long-term pain relief because they are a TEST.  Steroid is NOT used for this injection.  Steroid is used to provide long-lasting pain relief.  Since there is no steroid used, there will not be any long-lasting pain relief.    The day of your medial branch block TEST:    Do not take any pain medication prior to the injections.    Try to do activities that increase your pain.  We want you to have a high level of pain on the day of the TEST as it  makes it easier to know the results.  A pain rating of 5 out of 10 or greater will be required.      Other Information:    - You may eat and drink on the day of the TEST.  - You should take your other medications (just not pain medications) as prescribed.  - You will be asked to fill out a pain diary for the 6 hours following the TEST.      After the TEST    Please do not take any pain medications for 6 hours following the TEST.  After the pain diary is filled out, you may resume taking your pain medications.  Please return the pain diary to the [facility] as soon as you are able.  You will be contacted by a member of the clinical team with the provider recommendations for next steps after the pain diary has been reviewed.   A. You may be asked to come in for a follow-up appointment to discuss other treatment options.   B. It may be recommended that you have an injection to help treat your pain.   C. You may need to come in for a second set of medial branch blocks (TESTS).

## 2024-08-20 NOTE — PROGRESS NOTES
"    The patient has been notified of following:     \"This video visit will be conducted via a call between you and your physician/provider. We have found that certain health care needs can be provided without the need for an in-person physical exam.  This service lets us provide the care you need with a video conversation.  If a prescription is necessary we can send it directly to your pharmacy.  If lab work is needed we can place an order for that and you can then stop by our lab to have the test done at a later time.      Patient has given verbal consent to a Video visit? Yes    HEART CARE VIDEO ENCOUNTER        The patient has chosen to have the visit conducted as a video visit. This video visit is being conducted via a call between the patient and physician/provider. Health care needs are being provided without a physical exam.     Assessment/Recommendations   Assessment  Coronary artery disease with  of RCA and preserved LVEF - class I-II angina.   Permanent atrial fibrillation - rate controlled by reported vital signs. Asymptomatic. On Eliquis for stroke prevention.  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  H/o DVT, likely provoked.  Erectile dysfunction - likely multifactorial. From a cardiac standpoint he is likely safe to resume sexual activity.    Plan:  Continue medications without changes.  Encouraged regular exercise   Renewed cardiac medications  Follow up in a year.      Follow Up Plan:   I have reviewed the note as documented.  This accurately captures the substance of my conversation with the patient.    Total time of video between patient and provider was 12 minutes   Start Time: 10:43   Stop Time: 10:55    Originating Location (pt. Location): Home    Distant Location (provider location):  [facility] HE[facility]     Mode of Communication:  Video Conference via [employer]       History of Present Illness/Subjective    Damion " KEVIN Quinones is a 68 year old male who is being evaluated via a billable video visit and has consented to a video visit. Damion Quinones has a history of atrial fibrillation, CAD, liver transplant, kidney transplant. He is doing well. Exercising 3-4 days per week on a stationary bicycle. Rode 15 miles yesterday. No exertional cardiorespiratory symptoms.  Tolerating medications.      I have reviewed and updated the patient's Past Medical History, Social History, Family History and Medication List.     Physical Examination Review of Systems   Physical exam not performed on video visit.                                           Medical History  Surgical History Family History Social History   Past Medical History:   Diagnosis Date    ANCA-associated vasculitis (H) 2022    Antiplatelet or antithrombotic long-term use     Avascular necrosis (H)     Left femoral head    C. difficile colitis     Coronary artery disease     [facility] 4/2023 - complete occlusion of RCA    Gout     HCC (hepatocellular carcinoma) (H) 09/05/2023    History of hemochromatosis 10/11/2019    Hypertension     Hypothyroidism 12/19/2023    IgA nephropathy     Obesity     PAF (paroxysmal atrial fibrillation) (H)     Pre-diabetes 2023    Psoriatic arthritis (H)     RA (rheumatoid arthritis) (H)     Status post kidney transplant 06/06/2023    Induction with thymoglobulin 4mg/kg, + DSA CW9    Status post liver transplantation (H) 06/06/2023    for hx of alcohol related cirrhosis    Past Surgical History:   Procedure Laterality Date    APPENDECTOMY      Removed at 18 Years Old     BENCH KIDNEY  06/06/2023    Procedure: Bench kidney;  Surgeon: Chad Clifton MD;  Location:  OR    BENCH LIVER  06/06/2023    Procedure: Bench liver;  Surgeon: Chad Clifton MD;  Location:  OR    COLONOSCOPY      2014 at [facility]     CV CORONARY ANGIOGRAM N/A 04/27/2023    Procedure: Coronary Angiogram;  Surgeon: Pablo Araujo MD;  Location:   HEART CARDIAC CATH LAB    EXPLORE GROIN N/A 12/10/2023    Procedure: Umbilical wound exploration, incision and drainage of abcess, exploratory laparotomy, mesh excision, small bowel primary repair;  Surgeon: Chad Clifton MD;  Location: [facility] OR    EYE SURGERY Bilateral     Cataract    H STATISTIC PICC LINE INSERTION >5YR, FAILED Left 2023    Unable to advance catheter over the axillary area    H STATISTIC PICC LINE INSERTION >5YR, FAILED      HERNIA REPAIR      History of bilateral inguinal hernia repair: 10/28/2014. Open hernia repair: 10/2017. Abdominal wound exploration and debridement 2017    HERNIORRHAPHY UMBILICAL N/A 2023    Procedure: HERNIORRHAPHY, UMBILICAL, OPEN, WITH MESH;  Surgeon: Chad Clifton MD;  Location: [facility] OR    INSERT SHUNT PORTAL TRANSJUGULAR INTRAHEPTIC  2022    IR CVC NON TUNNEL LINE REMOVAL  2023    IR CVC NON TUNNEL PLACEMENT > 5 YRS  2023    IR CVC TUNNEL PLACEMENT > 5 YRS OF AGE  2023    IR CVC TUNNEL PLACEMENT > 5 YRS OF AGE  2023    IR PICC PLACEMENT > 5 YRS OF AGE  2023    IR RENAL BIOPSY RIGHT  2023    picc failed attempt Right 2023    PICC failed attempt Right arm unable to thread the catheter in.    RETURN LIVER TRANSPLANT N/A 2023    Procedure: Return liver transplant. Intra-operative ultrasound;  Surgeon: Chad Clifton MD;  Location: [facility] OR    TRANSPLANT KIDNEY RECIPIENT  DONOR N/A 2023    Procedure: Transplant kidney recipient  donor, ureteral stent placement;  Surgeon: Chad Clifton MD;  Location: [facility] OR    TRANSPLANT LIVER RECIPIENT  DONOR N/A 2023    Procedure: Transplant liver recipient  donor;  Surgeon: Chad Clifton MD;  Location: [facility] OR    Family History   Problem Relation Age of Onset    Lung Cancer Mother     Colon Cancer Father     Lung Cancer Brother     Heart Failure Brother     Kidney Disease Brother       Social  History     Socioeconomic History    Marital status:      Spouse name: Not on file    Number of children: Not on file    Years of education: Not on file    Highest education level: Not on file   Occupational History    Not on file   Tobacco Use    Smoking status: Never     Passive exposure: Never    Smokeless tobacco: Never   Vaping Use    Vaping status: Never Used   Substance and Sexual Activity    Alcohol use: Not Currently     Comment: Last ETOH use was 12/31/2021    Drug use: Not Currently    Sexual activity: Yes   Other Topics Concern    Parent/sibling w/ CABG, MI or angioplasty before 65F 55M? Not Asked   Social History Narrative    Not on file     Social Determinants of Health     Financial Resource Strain: Not on file   Food Insecurity: Not on file   Transportation Needs: Not on file   Physical Activity: Sufficiently Active (3/27/2024)    Exercise Vital Sign     Days of Exercise per Week: 5 days     Minutes of Exercise per Session: 50 min   Stress: No Stress Concern Present (3/27/2024)    [employer] - Occupational Stress Questionnaire     Feeling of Stress : Not at all   Social Connections: Unknown (3/27/2024)    Social Connection and Isolation Panel [NHANES]     Frequency of Communication with Friends and Family: Not on file     Frequency of Social Gatherings with Friends and Family: Twice a week     Attends Hindu Services: Not on file     Active Member of Clubs or Organizations: Not on file     Attends Club or Organization Meetings: Not on file     Marital Status: Not on file   Interpersonal Safety: Not on file   Housing Stability: Not on file          Medications  Allergies   Current Outpatient Medications   Medication Sig Dispense Refill    Alcohol Swabs PADS Use to swab the area of the injection or quyen as directed Per insurance coverage 100 each 0    allopurinol (ZYLOPRIM) 100 MG tablet Take 100 mg by mouth daily      allopurinol (ZYLOPRIM) 300 MG tablet Take  300 mg by mouth daily 300 mg and 100 mg total dose 400 mg daily      anakinra (KINERET) 100 MG/0.67ML SOSY injection Inject 100 mg Subcutaneous twice a week      apixaban ANTICOAGULANT (ELIQUIS ANTICOAGULANT) 5 MG tablet Take 1 tablet (5 mg) by mouth 2 times daily 180 tablet 3    atorvastatin (LIPITOR) 40 MG tablet Take 1 tablet (40 mg) by mouth every evening 90 tablet 3    capsaicin (ZOSTRIX) 0.025 % external cream Apply to feet two times per day (Patient taking differently: as needed Apply to feet two times per day) 60 g 1    clotrimazole (LOTRIMIN) 1 % external cream Apply topically 2 times daily For feet (Patient taking differently: Apply topically as needed For feet) 60 g 11    furosemide (LASIX) 20 MG tablet TAKE 1 TABLET(20 MG) BY MOUTH DAILY 90 tablet 3    gabapentin (NEURONTIN) 100 MG capsule Take 1 capsule (100 mg) by mouth every morning AND 1 capsule (100 mg) daily (with lunch) AND 3 capsules (300 mg) every evening. 100 mg in am, 100 mg in the afternoon and 200 mg at bedtime by mouth daily and 100mg at midnight if not sleeping 150 capsule 11    levothyroxine (SYNTHROID/LEVOTHROID) 88 MCG tablet Take 1 tablet (88 mcg) by mouth daily Takes in the middle of the night daily 90 tablet 3    magnesium oxide (MAG-OX) 400 MG tablet Take 2 tablets (800 mg) by mouth 2 times daily 60 tablet 0    metoprolol tartrate (LOPRESSOR) 50 MG tablet Take 1 tablet (50 mg) by mouth 2 times daily 180 tablet 3    multivitamin w/minerals (THERA-VIT-M) tablet Take 1 tablet by mouth daily 30 tablet 0    MYFORTIC (BRAND) 180 MG EC tablet Take 3 tablets (540 mg) by mouth 2 times daily 540 tablet 3    pantoprazole (PROTONIX) 20 MG EC tablet Take 1 tablet (20 mg) by mouth every other day 45 tablet 3    predniSONE (DELTASONE) 5 MG tablet Take 1 tablet (5 mg) by mouth daily 90 tablet 3    Sharps Container MISC Use as directed to dispose of needles, lancets and other sharps 1 each 0    simethicone (MYLICON) 125 MG chewable tablet Take 125  mg by mouth 4 times daily as needed for intestinal gas      sulfamethoxazole-trimethoprim (BACTRIM) 400-80 MG tablet Take 1 tablet by mouth daily 90 tablet 3    tacrolimus (GENERIC EQUIVALENT) 0.5 MG capsule Take 1 capsule (0.5 mg) by mouth 2 times daily 180 capsule 3    tadalafil (CIALIS) 5 MG tablet Take 1 tablet (5 mg) by mouth daily as needed (take 1-2 tablets at least 30 minutes prior to sexual activity.) 20 tablet 5    triamcinolone (KENALOG) 0.1 % external ointment Apply topically 2 times daily (Patient taking differently: Apply topically as needed) 454 g 3    ursodiol (ACTIGALL) 250 MG tablet Take 1 tablet (250 mg) by mouth 2 times daily 180 tablet 3    Blood Glucose Monitoring Suppl (ONETOUCH VERIO REFLECT) w/Device KIT  (Patient not taking: Reported on 8/20/2024)      mupirocin (BACTROBAN) 2 % external ointment Apply topically 2 times daily (Patient taking differently: Apply topically as needed) 60 g 3    No Known Allergies      Lab Results    Chemistry/lipid CBC Cardiac Enzymes/BNP/TSH/INR   Lab Results   Component Value Date    CHOL 119 04/29/2024    HDL 39 (L) 04/29/2024    TRIG 140 04/29/2024    BUN 15.2 07/30/2024     07/30/2024    CO2 28 07/30/2024    Lab Results   Component Value Date    WBC 6.8 07/30/2024    HGB 15.0 07/30/2024    HCT 47.5 07/30/2024    MCV 94 07/30/2024     07/30/2024    Lab Results   Component Value Date    TSH 2.26 06/25/2024    INR 1.92 (H) 12/11/2023

## 2024-08-20 NOTE — LETTER
"8/20/2024    Gary Serrano MD  [address] [facility] [address]    RE: Damion Quinones       Dear Colleague,     I had the pleasure of seeing Damion Quinones in the [facility].      The patient has been notified of following:     \"This video visit will be conducted via a call between you and your physician/provider. We have found that certain health care needs can be provided without the need for an in-person physical exam.  This service lets us provide the care you need with a video conversation.  If a prescription is necessary we can send it directly to your pharmacy.  If lab work is needed we can place an order for that and you can then stop by our lab to have the test done at a later time.      Patient has given verbal consent to a Video visit? Yes    HEART CARE VIDEO ENCOUNTER        The patient has chosen to have the visit conducted as a video visit. This video visit is being conducted via a call between the patient and physician/provider. Health care needs are being provided without a physical exam.     Assessment/Recommendations   Assessment  Coronary artery disease with  of RCA and preserved LVEF - class I-II angina.   Permanent atrial fibrillation - rate controlled by reported vital signs. Asymptomatic. On Eliquis for stroke prevention.  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  H/o DVT, likely provoked.  Erectile dysfunction - likely multifactorial. From a cardiac standpoint he is likely safe to resume sexual activity.    Plan:  Continue medications without changes.  Encouraged regular exercise   Renewed cardiac medications  Follow up in a year.      Follow Up Plan:   I have reviewed the note as documented.  This accurately captures the substance of my conversation with the patient.    Total time of video between patient and provider was 12 minutes   Start Time: 10:43   Stop Time: 10:55    Originating Location (pt. " Location): Home    Distant Location (provider location):  [facility]     Mode of Communication:  Video Conference via [employer]       History of Present Illness/Subjective    Damion Quinones is a 68 year old male who is being evaluated via a billable video visit and has consented to a video visit. Damion Quinones has a history of atrial fibrillation, CAD, liver transplant, kidney transplant. He is doing well. Exercising 3-4 days per week on a stationary bicycle. Rode 15 miles yesterday. No exertional cardiorespiratory symptoms.  Tolerating medications.      I have reviewed and updated the patient's Past Medical History, Social History, Family History and Medication List.     Physical Examination Review of Systems   Physical exam not performed on video visit.                                           Medical History  Surgical History Family History Social History   Past Medical History:   Diagnosis Date     ANCA-associated vasculitis (H) 2022     Antiplatelet or antithrombotic long-term use      Avascular necrosis (H)     Left femoral head     C. difficile colitis      Coronary artery disease     [facility] 4/2023 - complete occlusion of RCA     Gout      HCC (hepatocellular carcinoma) (H) 09/05/2023     History of hemochromatosis 10/11/2019     Hypertension      Hypothyroidism 12/19/2023     IgA nephropathy      Obesity      PAF (paroxysmal atrial fibrillation) (H)      Pre-diabetes 2023     Psoriatic arthritis (H)      RA (rheumatoid arthritis) (H)      Status post kidney transplant 06/06/2023    Induction with thymoglobulin 4mg/kg, + DSA CW9     Status post liver transplantation (H) 06/06/2023    for hx of alcohol related cirrhosis    Past Surgical History:   Procedure Laterality Date     APPENDECTOMY      Removed at 18 Years Old      BENCH KIDNEY  06/06/2023    Procedure: Bench kidney;  Surgeon: Chad Clifton MD;  Location:  OR     BENCH LIVER  06/06/2023    Procedure: Bench liver;   Surgeon: Chad Clifton MD;  Location: [facility] OR     COLONOSCOPY       at [facility].      CV CORONARY ANGIOGRAM N/A 2023    Procedure: Coronary Angiogram;  Surgeon: Pablo Araujo MD;  Location:  HEART CARDIAC CATH LAB     EXPLORE GROIN N/A 12/10/2023    Procedure: Umbilical wound exploration, incision and drainage of abcess, exploratory laparotomy, mesh excision, small bowel primary repair;  Surgeon: Chad Clifton MD;  Location: [facility] OR     EYE SURGERY Bilateral     Cataract     H STATISTIC PICC LINE INSERTION >5YR, FAILED Left 2023    Unable to advance catheter over the axillary area     H STATISTIC PICC LINE INSERTION >5YR, FAILED       HERNIA REPAIR      History of bilateral inguinal hernia repair: 10/28/2014. Open hernia repair: 10/2017. Abdominal wound exploration and debridement 2017     HERNIORRHAPHY UMBILICAL N/A 2023    Procedure: HERNIORRHAPHY, UMBILICAL, OPEN, WITH MESH;  Surgeon: Chad Clifton MD;  Location: [facility] OR     INSERT SHUNT PORTAL TRANSJUGULAR INTRAHEPTIC  2022     IR CVC NON TUNNEL LINE REMOVAL  2023     IR CVC NON TUNNEL PLACEMENT > 5 YRS  2023     IR CVC TUNNEL PLACEMENT > 5 YRS OF AGE  2023     IR CVC TUNNEL PLACEMENT > 5 YRS OF AGE  2023     IR PICC PLACEMENT > 5 YRS OF AGE  2023     IR RENAL BIOPSY RIGHT  2023     picc failed attempt Right 2023    PICC failed attempt Right arm unable to thread the catheter in.     RETURN LIVER TRANSPLANT N/A 2023    Procedure: Return liver transplant. Intra-operative ultrasound;  Surgeon: Chad Clifton MD;  Location: [facility] OR     TRANSPLANT KIDNEY RECIPIENT  DONOR N/A 2023    Procedure: Transplant kidney recipient  donor, ureteral stent placement;  Surgeon: Chad Clifton MD;  Location: [facility] OR     TRANSPLANT LIVER RECIPIENT  DONOR N/A 2023    Procedure: Transplant liver recipient   donor;  Surgeon: Chad Clifton MD;  Location:  OR    Family History   Problem Relation Age of Onset     Lung Cancer Mother      Colon Cancer Father      Lung Cancer Brother      Heart Failure Brother      Kidney Disease Brother       Social History     Socioeconomic History     Marital status:      Spouse name: Not on file     Number of children: Not on file     Years of education: Not on file     Highest education level: Not on file   Occupational History     Not on file   Tobacco Use     Smoking status: Never     Passive exposure: Never     Smokeless tobacco: Never   Vaping Use     Vaping status: Never Used   Substance and Sexual Activity     Alcohol use: Not Currently     Comment: Last ETOH use was 12/31/2021     Drug use: Not Currently     Sexual activity: Yes   Other Topics Concern     Parent/sibling w/ CABG, MI or angioplasty before 65F 55M? Not Asked   Social History Narrative     Not on file     Social Determinants of Health     Financial Resource Strain: Not on file   Food Insecurity: Not on file   Transportation Needs: Not on file   Physical Activity: Sufficiently Active (3/27/2024)    Exercise Vital Sign      Days of Exercise per Week: 5 days      Minutes of Exercise per Session: 50 min   Stress: No Stress Concern Present (3/27/2024)    [employer] - Occupational Stress Questionnaire      Feeling of Stress : Not at all   Social Connections: Unknown (3/27/2024)    Social Connection and Isolation Panel [NHANES]      Frequency of Communication with Friends and Family: Not on file      Frequency of Social Gatherings with Friends and Family: Twice a week      Attends Yarsanism Services: Not on file      Active Member of Clubs or Organizations: Not on file      Attends Club or Organization Meetings: Not on file      Marital Status: Not on file   Interpersonal Safety: Not on file   Housing Stability: Not on file          Medications  Allergies   Current Outpatient  Medications   Medication Sig Dispense Refill     Alcohol Swabs PADS Use to swab the area of the injection or quyen as directed Per insurance coverage 100 each 0     allopurinol (ZYLOPRIM) 100 MG tablet Take 100 mg by mouth daily       allopurinol (ZYLOPRIM) 300 MG tablet Take 300 mg by mouth daily 300 mg and 100 mg total dose 400 mg daily       anakinra (KINERET) 100 MG/0.67ML SOSY injection Inject 100 mg Subcutaneous twice a week       apixaban ANTICOAGULANT (ELIQUIS ANTICOAGULANT) 5 MG tablet Take 1 tablet (5 mg) by mouth 2 times daily 180 tablet 3     atorvastatin (LIPITOR) 40 MG tablet Take 1 tablet (40 mg) by mouth every evening 90 tablet 3     capsaicin (ZOSTRIX) 0.025 % external cream Apply to feet two times per day (Patient taking differently: as needed Apply to feet two times per day) 60 g 1     clotrimazole (LOTRIMIN) 1 % external cream Apply topically 2 times daily For feet (Patient taking differently: Apply topically as needed For feet) 60 g 11     furosemide (LASIX) 20 MG tablet TAKE 1 TABLET(20 MG) BY MOUTH DAILY 90 tablet 3     gabapentin (NEURONTIN) 100 MG capsule Take 1 capsule (100 mg) by mouth every morning AND 1 capsule (100 mg) daily (with lunch) AND 3 capsules (300 mg) every evening. 100 mg in am, 100 mg in the afternoon and 200 mg at bedtime by mouth daily and 100mg at midnight if not sleeping 150 capsule 11     levothyroxine (SYNTHROID/LEVOTHROID) 88 MCG tablet Take 1 tablet (88 mcg) by mouth daily Takes in the middle of the night daily 90 tablet 3     magnesium oxide (MAG-OX) 400 MG tablet Take 2 tablets (800 mg) by mouth 2 times daily 60 tablet 0     metoprolol tartrate (LOPRESSOR) 50 MG tablet Take 1 tablet (50 mg) by mouth 2 times daily 180 tablet 3     multivitamin w/minerals (THERA-VIT-M) tablet Take 1 tablet by mouth daily 30 tablet 0     MYFORTIC (BRAND) 180 MG EC tablet Take 3 tablets (540 mg) by mouth 2 times daily 540 tablet 3     pantoprazole (PROTONIX) 20 MG EC tablet Take 1  tablet (20 mg) by mouth every other day 45 tablet 3     predniSONE (DELTASONE) 5 MG tablet Take 1 tablet (5 mg) by mouth daily 90 tablet 3     Sharps Container MISC Use as directed to dispose of needles, lancets and other sharps 1 each 0     simethicone (MYLICON) 125 MG chewable tablet Take 125 mg by mouth 4 times daily as needed for intestinal gas       sulfamethoxazole-trimethoprim (BACTRIM) 400-80 MG tablet Take 1 tablet by mouth daily 90 tablet 3     tacrolimus (GENERIC EQUIVALENT) 0.5 MG capsule Take 1 capsule (0.5 mg) by mouth 2 times daily 180 capsule 3     tadalafil (CIALIS) 5 MG tablet Take 1 tablet (5 mg) by mouth daily as needed (take 1-2 tablets at least 30 minutes prior to sexual activity.) 20 tablet 5     triamcinolone (KENALOG) 0.1 % external ointment Apply topically 2 times daily (Patient taking differently: Apply topically as needed) 454 g 3     ursodiol (ACTIGALL) 250 MG tablet Take 1 tablet (250 mg) by mouth 2 times daily 180 tablet 3     Blood Glucose Monitoring Suppl (ONETOUCH VERIO REFLECT) w/Device KIT  (Patient not taking: Reported on 8/20/2024)       mupirocin (BACTROBAN) 2 % external ointment Apply topically 2 times daily (Patient taking differently: Apply topically as needed) 60 g 3    No Known Allergies      Lab Results    Chemistry/lipid CBC Cardiac Enzymes/BNP/TSH/INR   Lab Results   Component Value Date    CHOL 119 04/29/2024    HDL 39 (L) 04/29/2024    TRIG 140 04/29/2024    BUN 15.2 07/30/2024     07/30/2024    CO2 28 07/30/2024    Lab Results   Component Value Date    WBC 6.8 07/30/2024    HGB 15.0 07/30/2024    HCT 47.5 07/30/2024    MCV 94 07/30/2024     07/30/2024    Lab Results   Component Value Date    TSH 2.26 06/25/2024    INR 1.92 (H) 12/11/2023              Thank you for allowing me to participate in the care of your patient.      Sincerely,     Phillip Marrero MD     [facility]  cc:   Phillip Marrero,  MD  [address] [facility] [address], [address]  [place] [address]

## 2024-08-21 ENCOUNTER — TELEPHONE (OUTPATIENT)
Dept: PHYSICAL MEDICINE AND REHAB | Facility: CLINIC | Age: 69
End: 2024-08-21
Payer: MEDICARE

## 2024-08-21 DIAGNOSIS — M47.816 SPONDYLOSIS OF LUMBAR REGION WITHOUT MYELOPATHY OR RADICULOPATHY: Primary | ICD-10-CM

## 2024-08-21 NOTE — TELEPHONE ENCOUNTER
Pt called back. Pt states he will be changing insurance on 9/1. He inquires if it would be reasonable to try and get approval with his current insurance and get this done prior to 9/1, or if he needs to wait until his insurance changes and then schedule after this.     Explained will update our insurance team to get their recommendation and then patient will be called back.

## 2024-08-21 NOTE — TELEPHONE ENCOUNTER
----- Message from Abdulaziz Mejia sent at 8/21/2024 10:28 AM CDT -----  Regarding: RE: MBB/RFA PROCESS  I reviewed patient's pain diary.  Had good response from initial bilateral L3, 4, 5 medial branch blocks.  Recommend proceeding with confirmatory blocks.  ----- Message -----  From: Breyer, Nathan J, RN  Sent: 8/21/2024   9:01 AM CDT  To: Abdulaziz Mejia DO; #  Subject: FW: MBB/RFA PROCESS                              Pain diary received and available in chart for review from confirmatory MBBs.  Please forward recommendation to Procedure Support Pool in-basket.  Thanks!  ----- Message -----  From: Rissa Cabral RN  Sent: 8/20/2024   8:03 AM CDT  To: [facility] Procedure Support Pool  Subject: RE: MBB/RFA PROCESS                              Pt was in today, 8-20, for initial L3, L4, L5 MBBs as ordered by Dr. Mejia and performed by Dr. Carlson. Awaiting return of pain diary.  ----- Message -----  From: Breyer, Nathan J, RN  Sent: 8/1/2024   9:09 AM CDT  To: [facility] Procedure Support Pool  Subject: FW: MBB/RFA PROCESS                              Rescheduled for 8/8/24.  ----- Message -----  From: Pieter Wood  Sent: 7/17/2024  12:00 PM CDT  To: Shanique Macedo RN; #  Subject: RE: MBB/RFA PROCESS                              Scheduled for 7/31/24 @ 8am [facility]  ----- Message -----  From: Shanique Macedo RN  Sent: 7/17/2024  11:37 AM CDT  To: [facility] Procedure Support Pool; #  Subject: FW: MBB/RFA PROCESS                              Called patient and informed him of approval to move forward with B/L L3, L4, L5 MBBs as opposed to the B/L L4, L5 MBBs that he had previously. Order for initial B/L L3, L4, L5 MBBs placed and inj requirements reviewed.     Scheduling Team,   Please call patient to schedule and update this thread once scheduled.    Thank you!  ----- Message -----  From: William Cotton  Sent: 7/15/2024   7:18 AM CDT  To: Nathan J Breyer, RN; #  Subject: RE: MBB/RFA PROCESS                               Tc Martínez,    I checked the patient's medical policy and he can have up to 6 MBB per year.    Thanks!    William Senior  ----- Message -----  From: Breyer, Nathan J, RN  Sent: 7/12/2024   8:10 AM CDT  To: [facility] Procedure Support Pool; #  Subject: FW: MBB/RFA PROCESS                              PA Team-    Is there any way for us to check to see if this patient's insurance plan would limit him to only 2 MBBs in a year?  Dr. Mejia has concerns that if we were to add a level and pursue a B/L L3, L4, L5 RF, that the RF PA request would be denied on account of it being 3 sets of MBBs (1 for the B/L L4, L5 MBBs that were already done and, potentially, 2 for the B/L L3, L4, L5 MBBs as workup for the B/L L3-5 RF).  If there isn't a restriction, then we can move forward with offering the patient the additional level to try to improve on the amount of pain relief with the blocks.    Appreciate your help in exploring this.    Let us know.      Mauricio  ----- Message -----  From: Abdulaziz Mejia DO  Sent: 7/10/2024  11:37 AM CDT  To: Nathan J Breyer, RN; #  Subject: RE: MBB/RFA PROCESS                              I reviewed the patient's pain diary.  He had very good results from the medial branch blocks however they were borderline.  I would like to proceed with bilateral L3, 4, 5 medial branch blocks.  I would like to evaluate for any additional relief when blocking the L4-5 along with the L5-S1 facets.  Please see if we can get this approved through insurance and if the patient is willing to add an additional level to the injections.  ----- Message -----  From: Breyer, Nathan J, RN  Sent: 7/10/2024   8:20 AM CDT  To: Abdulaziz Mejia DO; #  Subject: FW: MBB/RFA PROCESS                              Pain diary received and available for review in chart.  Please forward recommendation to the Procedure Support Pool in-basket.  Thanks!  ----- Message -----  From: Shanique Macedo RN  Sent:  7/9/2024   8:23 AM CDT  To: [facility] Procedure Support Pool  Subject: MBB/RFA PROCESS                                  Patient here today, 7/9/2024 for initial bilateral L4, L5 MBBs with Dr. Carlson as ordered by Dr. Mejia with eye to RFA. Awaiting pain diary at this time.

## 2024-08-22 NOTE — TELEPHONE ENCOUNTER
Called and informed pt message from the PA team.     , hold can be removed from 8/27 Dr. Carlson's injection schedule for this patient.     Patient will plan on calling the  with his new insurance info which will be through Medicare.

## 2024-08-30 ENCOUNTER — LAB (OUTPATIENT)
Dept: LAB | Facility: CLINIC | Age: 69
End: 2024-08-30
Payer: COMMERCIAL

## 2024-08-30 DIAGNOSIS — Z94.4 LIVER REPLACED BY TRANSPLANT (H): ICD-10-CM

## 2024-08-30 DIAGNOSIS — Z48.288 ENCOUNTER FOR AFTERCARE FOLLOWING MULTIPLE ORGAN TRANSPLANT: ICD-10-CM

## 2024-08-30 DIAGNOSIS — Z94.0 KIDNEY REPLACED BY TRANSPLANT: ICD-10-CM

## 2024-08-30 DIAGNOSIS — Z79.899 ENCOUNTER FOR LONG-TERM (CURRENT) USE OF MEDICATIONS: ICD-10-CM

## 2024-08-30 LAB
ALBUMIN SERPL BCG-MCNC: 4.1 G/DL (ref 3.5–5.2)
ALP SERPL-CCNC: 140 U/L (ref 40–150)
ALT SERPL W P-5'-P-CCNC: 26 U/L (ref 0–70)
ANION GAP SERPL CALCULATED.3IONS-SCNC: 10 MMOL/L (ref 7–15)
AST SERPL W P-5'-P-CCNC: 28 U/L (ref 0–45)
BASOPHILS # BLD AUTO: 0 10E3/UL (ref 0–0.2)
BASOPHILS NFR BLD AUTO: 0 %
BILIRUB DIRECT SERPL-MCNC: <0.2 MG/DL (ref 0–0.3)
BILIRUB SERPL-MCNC: 0.5 MG/DL
BK VIRUS SPECIMEN TYPE: NORMAL
BKV DNA # SPEC NAA+PROBE: NOT DETECTED IU/ML
BUN SERPL-MCNC: 15.5 MG/DL (ref 8–23)
CALCIUM SERPL-MCNC: 9.2 MG/DL (ref 8.8–10.4)
CHLORIDE SERPL-SCNC: 102 MMOL/L (ref 98–107)
CREAT SERPL-MCNC: 1.04 MG/DL (ref 0.67–1.17)
EGFRCR SERPLBLD CKD-EPI 2021: 79 ML/MIN/1.73M2
EOSINOPHIL # BLD AUTO: 0.2 10E3/UL (ref 0–0.7)
EOSINOPHIL NFR BLD AUTO: 3 %
ERYTHROCYTE [DISTWIDTH] IN BLOOD BY AUTOMATED COUNT: 15.1 % (ref 10–15)
GLUCOSE SERPL-MCNC: 91 MG/DL (ref 70–99)
HCO3 SERPL-SCNC: 27 MMOL/L (ref 22–29)
HCT VFR BLD AUTO: 45.5 % (ref 40–53)
HGB BLD-MCNC: 14.2 G/DL (ref 13.3–17.7)
IMM GRANULOCYTES # BLD: 0.1 10E3/UL
IMM GRANULOCYTES NFR BLD: 1 %
LYMPHOCYTES # BLD AUTO: 1.2 10E3/UL (ref 0.8–5.3)
LYMPHOCYTES NFR BLD AUTO: 19 %
MCH RBC QN AUTO: 30.2 PG (ref 26.5–33)
MCHC RBC AUTO-ENTMCNC: 31.2 G/DL (ref 31.5–36.5)
MCV RBC AUTO: 97 FL (ref 78–100)
MONOCYTES # BLD AUTO: 0.6 10E3/UL (ref 0–1.3)
MONOCYTES NFR BLD AUTO: 9 %
NEUTROPHILS # BLD AUTO: 4.3 10E3/UL (ref 1.6–8.3)
NEUTROPHILS NFR BLD AUTO: 69 %
PLATELET # BLD AUTO: 175 10E3/UL (ref 150–450)
POTASSIUM SERPL-SCNC: 4.2 MMOL/L (ref 3.4–5.3)
PROT SERPL-MCNC: 6.3 G/DL (ref 6.4–8.3)
RBC # BLD AUTO: 4.7 10E6/UL (ref 4.4–5.9)
SODIUM SERPL-SCNC: 139 MMOL/L (ref 135–145)
TACROLIMUS BLD-MCNC: 5.7 UG/L (ref 5–15)
TME LAST DOSE: NORMAL H
TME LAST DOSE: NORMAL H
WBC # BLD AUTO: 6.3 10E3/UL (ref 4–11)

## 2024-08-30 PROCEDURE — 36415 COLL VENOUS BLD VENIPUNCTURE: CPT

## 2024-08-30 PROCEDURE — 82248 BILIRUBIN DIRECT: CPT

## 2024-08-30 PROCEDURE — 85025 COMPLETE CBC W/AUTO DIFF WBC: CPT | Mod: QW

## 2024-08-30 PROCEDURE — 80197 ASSAY OF TACROLIMUS: CPT

## 2024-08-30 PROCEDURE — 80053 COMPREHEN METABOLIC PANEL: CPT

## 2024-08-30 PROCEDURE — 87799 DETECT AGENT NOS DNA QUANT: CPT

## 2024-09-06 ENCOUNTER — TELEPHONE (OUTPATIENT)
Dept: CARDIOLOGY | Facility: CLINIC | Age: 69
End: 2024-09-06
Payer: MEDICARE

## 2024-09-06 DIAGNOSIS — N52.1 ERECTILE DYSFUNCTION DUE TO DISEASES CLASSIFIED ELSEWHERE: Primary | ICD-10-CM

## 2024-09-06 DIAGNOSIS — I48.21 PERMANENT ATRIAL FIBRILLATION (H): ICD-10-CM

## 2024-09-09 RX ORDER — SILDENAFIL 50 MG/1
50 TABLET, FILM COATED ORAL DAILY PRN
Qty: 20 TABLET | Refills: 5 | Status: SHIPPED | OUTPATIENT
Start: 2024-09-09

## 2024-09-09 RX ORDER — SILDENAFIL 50 MG/1
50 TABLET, FILM COATED ORAL DAILY PRN
Qty: 20 TABLET | Refills: 5 | Status: SHIPPED | OUTPATIENT
Start: 2024-09-09 | End: 2024-09-09

## 2024-09-09 NOTE — TELEPHONE ENCOUNTER
From: Phillip Marrero MD  Sent: 9/9/2024  10:09 AM CDT  To: Salena David    Can try sildenafil 50 mg once daily as needed 1 hour before sexual activity.    Can dispense number of doses similar to what is written for with the tadalafil.    JKH  ----- Message -----  From: Salena David  Sent: 9/6/2024   5:04 PM CDT  To: Phillip Marrero MD    ----- Message from Salena David sent at 9/6/2024  5:04 PM CDT -----  Hi Dr. Marrero,    Please advise of any alternatives- thanks

## 2024-09-13 ENCOUNTER — MYC MEDICAL ADVICE (OUTPATIENT)
Dept: OTHER | Age: 69
End: 2024-09-13

## 2024-09-17 ENCOUNTER — RADIOLOGY INJECTION OFFICE VISIT (OUTPATIENT)
Dept: PHYSICAL MEDICINE AND REHAB | Facility: CLINIC | Age: 69
End: 2024-09-17
Attending: PHYSICAL MEDICINE & REHABILITATION
Payer: MEDICARE

## 2024-09-17 VITALS
RESPIRATION RATE: 16 BRPM | HEART RATE: 68 BPM | DIASTOLIC BLOOD PRESSURE: 82 MMHG | SYSTOLIC BLOOD PRESSURE: 136 MMHG | TEMPERATURE: 98.7 F | OXYGEN SATURATION: 98 %

## 2024-09-17 DIAGNOSIS — M47.816 SPONDYLOSIS OF LUMBAR REGION WITHOUT MYELOPATHY OR RADICULOPATHY: ICD-10-CM

## 2024-09-17 PROCEDURE — 64494 INJ PARAVERT F JNT L/S 2 LEV: CPT | Mod: KX | Performed by: STUDENT IN AN ORGANIZED HEALTH CARE EDUCATION/TRAINING PROGRAM

## 2024-09-17 PROCEDURE — 64493 INJ PARAVERT F JNT L/S 1 LEV: CPT | Mod: KX | Performed by: STUDENT IN AN ORGANIZED HEALTH CARE EDUCATION/TRAINING PROGRAM

## 2024-09-17 RX ORDER — LIDOCAINE HYDROCHLORIDE 10 MG/ML
INJECTION, SOLUTION EPIDURAL; INFILTRATION; INTRACAUDAL; PERINEURAL
Status: COMPLETED | OUTPATIENT
Start: 2024-09-17 | End: 2024-09-17

## 2024-09-17 RX ADMIN — LIDOCAINE HYDROCHLORIDE 5 ML: 10 INJECTION, SOLUTION EPIDURAL; INFILTRATION; INTRACAUDAL; PERINEURAL at 10:12

## 2024-09-17 ASSESSMENT — PAIN SCALES - GENERAL
PAINLEVEL: MILD PAIN (2)
PAINLEVEL: EXTREME PAIN (8)

## 2024-09-17 NOTE — PATIENT INSTRUCTIONS
Please complete your pain diary and return the diary to the Spine Center at your earliest convenience.  The Spine Center will contact you to schedule your next appointment after your pain diary is reviewed by your provider.  Thank you.    DISCHARGE INSTRUCTIONS    During office hours (8:00 a.m.- 4:00 p.m.) questions or concerns may be answered  by calling Spine Center Navigation Nurses at  [phone].  Messages received after hours will be returned the following business day.      In the case of an emergency, please dial 911 or seek assistance at the nearest Emergency Room/Urgent Care facility.     All Patients:  You may experience an increase in your symptoms for the first 2 days, once the numbing medication wears off.    You may resume your regular medication, no pain medication until you have completed your diary    You may shower. No swimming, tub bath or hot tub for 24 hours    Continue your activities that can cause you pain to test the blocks.                         You should not drive for the next 1 to 3 hours (have someone drive you)           POSSIBLE PROCEDURE SIDE EFFECTS  -Call Spine Center if concerned-    Increased Pain  Increased numbness/tingling     Nausea/Vomiting  Bruising/bleeding at site (hematoma)             Swelling at site (edema) Headache  Difficulty walking  Infection        Fever greater than 100.5      Please complete your pain diary and return the diary to the Spine Center at your earliest convenience.  The Spine Center will contact you once your pain diary has been received and reviewed by your provider.  Thank you.

## 2024-09-18 ENCOUNTER — TELEPHONE (OUTPATIENT)
Dept: PHYSICAL MEDICINE AND REHAB | Facility: CLINIC | Age: 69
End: 2024-09-18
Payer: MEDICARE

## 2024-09-18 DIAGNOSIS — M47.816 SPONDYLOSIS OF LUMBAR REGION WITHOUT MYELOPATHY OR RADICULOPATHY: Primary | ICD-10-CM

## 2024-09-18 NOTE — TELEPHONE ENCOUNTER
----- Message from Rissa HOPE sent at 9/18/2024  8:32 AM CDT -----  Regarding: FW: MBB/RFA PROCESS  OK to move forward with RFA per Dr. Mejia.  ----- Message -----  From: Breyer, Nathan J, RN  Sent: 9/17/2024  10:03 AM CDT  To: [facility] Procedure Support Pool  Subject: FW: MBB/RFA PROCESS                              Patient had confirmatory B/L L3, L4, L5 MBBs on 9/17/24 by Dr. Carlson as ordered by Dr. Mejia.  Awaiting return of pain diary at this time.    #If progressing to RF, patient would like sedation medication ordered.  Was pre-consented at time of confirmatories#  ----- Message -----  From: Breyer, Nathan J, RN  Sent: 9/13/2024   9:09 AM CDT  To: [facility] Procedure Support Pool  Subject: FW: MBB/RFA PROCESS                              Confirmatory MBBs scheduled for 9/16/24.  ----- Message -----  From: Pippa Sierra  Sent: 8/21/2024   1:47 PM CDT  To: Rissa Cabral RN; #  Subject: RE: MBB/RFA PROCESS                              Waiting to hear back from the PA Team on this one.  Spot held on the 27th just in case.  ----- Message -----  From: Rissa Cabral RN  Sent: 8/21/2024  11:37 AM CDT  To: [facility] Procedure Support Pool; #  Subject: FW: MBB/RFA PROCESS                              Pt was notified of recommendation.    --please call patient to schedule confirmatory bilateral L3, L4, L5 MBBs with Dr. Carlson and update procedure pool.    Thanks!  Rissa  ----- Message -----  From: Abdulaziz Mejia DO  Sent: 8/21/2024  10:28 AM CDT  To: Nathan J Breyer, RN; #  Subject: RE: MBB/RFA PROCESS                              I reviewed patient's pain diary.  Had good response from initial bilateral L3, 4, 5 medial branch blocks.  Recommend proceeding with confirmatory blocks.  ----- Message -----  From: Breyer, Nathan J, RN  Sent: 8/21/2024   9:01 AM CDT  To: Abdulaziz Mejia DO; #  Subject: FW: TITO/BRIGHT PROCESS                              Pain diary received and  available in chart for review from confirmatory MBBs.  Please forward recommendation to Procedure Support Pool in-basket.  Thanks!  ----- Message -----  From: Rissa Cabral RN  Sent: 8/20/2024   8:03 AM CDT  To: [facility] Procedure Support Pool  Subject: RE: MBB/RFA PROCESS                              Pt was in today, 8-20, for initial L3, L4, L5 MBBs as ordered by Dr. Mejia and performed by Dr. Carlson. Awaiting return of pain diary.  ----- Message -----  From: Breyer, Nathan J, RN  Sent: 8/1/2024   9:09 AM CDT  To: [facility] Procedure Support Pool  Subject: FW: MBB/RFA PROCESS                              Rescheduled for 8/8/24.  ----- Message -----  From: Pieter Wood  Sent: 7/17/2024  12:00 PM CDT  To: Shanique Macedo RN; #  Subject: RE: MBB/RFA PROCESS                              Scheduled for 7/31/24 @ 8am [facility]  ----- Message -----  From: Shanique Macedo RN  Sent: 7/17/2024  11:37 AM CDT  To: [facility] Procedure Support Pool; #  Subject: FW: MBB/RFA PROCESS                              Called patient and informed him of approval to move forward with B/L L3, L4, L5 MBBs as opposed to the B/L L4, L5 MBBs that he had previously. Order for initial B/L L3, L4, L5 MBBs placed and inj requirements reviewed.     Scheduling Team,   Please call patient to schedule and update this thread once scheduled.    Thank you!  ----- Message -----  From: William Cotton  Sent: 7/15/2024   7:18 AM CDT  To: Nathan J Breyer, RN; #  Subject: RE: MBB/RFA PROCESS                              Tc Martínez,    AMY checked the patient's medical policy and he can have up to 6 MBB per year.    Thanks!    William Senior  ----- Message -----  From: Breyer, Nathan J, RN  Sent: 7/12/2024   8:10 AM CDT  To: [facility] Procedure Support Pool; #  Subject: FW: MBB/RFA PROCESS                              PA Team-    Is there any way for us to check to see if this patient's insurance plan would limit him to only 2  MBBs in a year?  Dr. Mejia has concerns that if we were to add a level and pursue a B/L L3, L4, L5 RF, that the RF PA request would be denied on account of it being 3 sets of MBBs (1 for the B/L L4, L5 MBBs that were already done and, potentially, 2 for the B/L L3, L4, L5 MBBs as workup for the B/L L3-5 RF).  If there isn't a restriction, then we can move forward with offering the patient the additional level to try to improve on the amount of pain relief with the blocks.    Appreciate your help in exploring this.    Let us know.      Mauricio  ----- Message -----  From: Abdulaziz Mejia DO  Sent: 7/10/2024  11:37 AM CDT  To: Nathan J Breyer, RN; #  Subject: RE: MBB/RFA PROCESS                              I reviewed the patient's pain diary.  He had very good results from the medial branch blocks however they were borderline.  I would like to proceed with bilateral L3, 4, 5 medial branch blocks.  I would like to evaluate for any additional relief when blocking the L4-5 along with the L5-S1 facets.  Please see if we can get this approved through insurance and if the patient is willing to add an additional level to the injections.  ----- Message -----  From: Breyer, Nathan J, RN  Sent: 7/10/2024   8:20 AM CDT  To: Abdulaziz Mejia DO; #  Subject: FW: MBB/RFA PROCESS                              Pain diary received and available for review in chart.  Please forward recommendation to the Procedure Support Pool in-basket.  Thanks!  ----- Message -----  From: Shanique Macedo RN  Sent: 7/9/2024   8:23 AM CDT  To: [facility] Procedure Support Pool  Subject: MBB/RFA PROCESS                                  Patient here today, 7/9/2024 for initial bilateral L4, L5 MBBs with Dr. Carlson as ordered by Dr. Mejia with eye to RFA. Awaiting pain diary at this time.

## 2024-09-19 ENCOUNTER — TELEPHONE (OUTPATIENT)
Dept: PHYSICAL MEDICINE AND REHAB | Facility: CLINIC | Age: 69
End: 2024-09-19
Payer: MEDICARE

## 2024-09-19 DIAGNOSIS — M47.816 LUMBAR FACET ARTHROPATHY: ICD-10-CM

## 2024-09-19 DIAGNOSIS — M47.816 SPONDYLOSIS OF LUMBAR REGION WITHOUT MYELOPATHY OR RADICULOPATHY: Primary | ICD-10-CM

## 2024-09-19 RX ORDER — DIAZEPAM 5 MG
TABLET ORAL
Qty: 2 TABLET | Refills: 0 | Status: SHIPPED | OUTPATIENT
Start: 2024-09-19

## 2024-09-19 NOTE — TELEPHONE ENCOUNTER
Pt has been preconsented for bilateral L3, L4, L4 medial branch RFA and is requesting oral sedation. Request forwarded to PSP.

## 2024-09-19 NOTE — PROGRESS NOTES
DISCHARGE  Reason for Discharge: Patient had met or was progressing toward goals, however he then left on an extended vacation and failed to reschedule.    Equipment Issued: N/A    Discharge Plan: Therapist was unable to discuss discharge planning with the patient as the discharge was unplanned.      Referring Provider:  Abdulaziz Mejia           06/04/24 0500   Appointment Info   Signing clinician's name / credentials Syd Grove, PT   Visits Used 6   Medical Diagnosis Lumbar radicular pain  Foraminal stenosis of lumbar region  Stenosis of lateral recess of lumbar spine  Lumbar facet arthropathy  Myofascial pain   PT Tx Diagnosis Low back pain with left-sided radicular symptoms   Other pertinent information Evaluate and Treat: Left lumbar radicula rpain with Lumb foraminal stenosis and lat recess stenosis  Lumbar program and nerve glides    Treatment Goals:  Improve posture  Increase strength  Decrease pain  Increase ROM  Improve function    Up to  1-2 x/week for 8 weeks    Exercise:  As indicated by therapy evaluation  Active/Assistive ROM  General conditioning  Passive ROM  Posture/Body mechanics training  Strengthening/Stabilization  Stretching/Flexibilty  Nerve glides        Modalities: (limited)  Ultrasound      Procedures:  Neuromuscular re-education   Progress Note/Certification   Start of Care Date 04/24/24   Onset of illness/injury or Date of Surgery 04/03/24  (Date recorded is date of referral, though patient reports long history of symptoms.)   Therapy Frequency 1 time per week   Predicted Duration 8 weeks   Certification date from 04/24/24   Certification date to 06/26/24   Progress Note Due Date 06/26/24   Progress Note Completed Date 05/29/24   PT Goal 1   Goal Identifier HEP   Goal Description Time will demonstrate mastery of (fbbmzf-oh-dh need for cueing) and compliance with (completion on at least 5/7 days/week) his home exercise program to facilitate increased rate of improvement.   Goal  "Progress 5 days per week   Target Date 05/22/24   Date Met 05/29/24   PT Goal 2   Goal Identifier RAYMUNDO   Goal Description Casey will demonstrate improved function as evidenced by an improved (decreased) RAYMUNDO score of less than or equal to 5%.   Goal Progress 18%   Target Date 06/26/24   PT Goal 3   Goal Identifier Sleep   Goal Description Casey will demonstrate improved tolerance to sleeping in a bad as evidenced by his ability to sleep through the night with less than 1 hour of disturbance on at least 5/7 nights per week in his bed.   Goal Progress Up to 2.5 hours disturbance per night   Target Date 06/26/24   PT Goal 4   Goal Identifier Walking   Goal Description Casey will demonstrate improved tolerance to functional mobility as evidenced by his ability to walk up to 20 minutes with self-report back and leg pain no higher than 2 out of 10.   Goal Progress 60 minutes with pain no more than 2/10   Target Date 06/26/24   Date Met 05/29/24   Subjective Report   Subjective Report Casey said that he hasn't done the exercises much over minh past week as he has been busy watching his granddaughter.   Objective Measures   Objective Measures Objective Measure 1;Objective Measure 2;Objective Measure 3;Objective Measure 4   Objective Measure 1   Objective Measure Lumbar Rotation AROM   Details 5/29/24: Left: 75% (\"pull\" in low back without radiation).  Right: 75-99%.   Objective Measure 2   Objective Measure Lower Extremity Strength   Details 5/29/24: Hip flexion: 5/5 bilateral.  Hip extension: minimal hip drop with single leg lift from bridge position bilaterally.  Ankle plantarflexion: 5/5 bilateral.  (All other bilateral hip abduction/adduction, knee flexion/extension, ankle dorsiflexion, and great toe extension: 5/5)   Objective Measure 3   Objective Measure Worst Pain   Details Since last visit: 3-4/10   Objective Measure 4   Objective Measure Neural Tension Testing   Details 5/29/24: Slump and SLR: negative bilaterally. "   Treatment Interventions (PT)   Interventions Therapeutic Procedure/Exercise;Neuromuscular Re-education   Therapeutic Procedure/Exercise   Therapeutic Procedures: strength, endurance, ROM, flexibility minutes (98773) 23   Therapeutic Procedures Ther Proc 2;Ther Proc 3;Ther Proc 4;Ther Proc 5;Ther Proc 6;Ther Proc 7   Ther Proc 1 Nustep + subjective report   Ther Proc 1 - Details 7 minutes (level 5)   Ther Proc 2 Lower trunk rotation   Ther Proc 2 - Details 2 minutes   Ther Proc 3 Bridge/with adduction isometric/with abduction isometric   Ther Proc 3 - Details 1 x 10 with 5 second holds/1 x 10/1 x 10 (green band)   Ther Proc 4 Cat/cow   Ther Proc 4 - Details Next visit   Ther Proc 5 Clam shell   Ther Proc 5 - Details 3 x 10 (green band; cueing for controlled rate and to avoid posterior trunk rotation)   Skilled Intervention Exercises to increase low back and hip/lower extremity mobility/flexibility and strength   Patient Response/Progress Added bridge variations and added resistance to clam shells. Casey tolerated all exercises and progressions well without increased symptoms and with cueing for proper form.   Neuromuscular Re-education   Neuromuscular re-ed of mvmt, balance, coord, kinesthetic sense, posture, proprioception minutes (49742) 15   Neuromuscular Re-education Neuro Re-ed 2;Neuro Re-ed 3   Neuro Re-ed 1 TA set   Neuro Re-ed 1 - Details With leg extension: 3 x 10 (cueing keep breathing and maintain core brace)   Neuro Re-ed 2 Pallof press   Neuro Re-ed 2 - Details 2 x 10 bilateral (red tubing; cueing to maintain core brace and keep breathing)   Neuro Re-ed 3 Pelvic rocks   Neuro Re-ed 3 - Details 1 x 20 anterior/posterior   Neuro Re-ed 4 Quadruped leg extension   Neuro Re-ed 4 - Details Next visit   Skilled Intervention Nerve glides to improve nerve mobility and decrease radicular symptoms.  Exercises to increase core body awareness and muscle control/activation.   Patient Response/Progress Added pallof  press. Casey tolerated all exercises and progressions well without increased symptoms and with continued cueing for proper form.   Education   Learner/Method Patient;Listening;Demonstration;Pictures/Video;No Barriers to Learning   Education Comments Casey reported some left anterior shoulder pain and limited motion that started a couple of weeks ago. Therapist instructed the patient to contact his primary care provider if his symptoms worsen or do not improve.   Plan   Home program See PTRx.   Plan for next session Continue to progress B hip strengthening; trial monster walks and progress nerve glides.  Manual therapy as appropriate.   Comments   Comments Assessment: Casey reports limited compliance with his home exercises over the last week due to being busy watching his granddaughter, however, he continues to report overall improvement. Therapy today consisted of progression of posterolateral hip and core strength/stability exercises to continue to improve his tolerance to functional tasks and household chores. He tolerated all exercises and progressions well, and he remains motivated to participate. He will continue to benefit from physical therapy to progress toward his remaining goals.   Total Session Time   Timed Code Treatment Minutes 38   Total Treatment Time (sum of timed and untimed services) 38

## 2024-09-20 DIAGNOSIS — Z94.0 KIDNEY REPLACED BY TRANSPLANT: ICD-10-CM

## 2024-09-20 RX ORDER — CAPSAICIN 0.025 %
CREAM (GRAM) TOPICAL
Qty: 60 G | Refills: 1 | Status: SHIPPED | OUTPATIENT
Start: 2024-09-20

## 2024-09-30 ENCOUNTER — TELEPHONE (OUTPATIENT)
Dept: FAMILY MEDICINE | Facility: CLINIC | Age: 69
End: 2024-09-30
Payer: MEDICARE

## 2024-10-01 ENCOUNTER — MYC MEDICAL ADVICE (OUTPATIENT)
Dept: OTHER | Age: 69
End: 2024-10-01

## 2024-10-01 ENCOUNTER — LAB (OUTPATIENT)
Dept: LAB | Facility: CLINIC | Age: 69
End: 2024-10-01
Payer: MEDICARE

## 2024-10-01 DIAGNOSIS — Z94.4 LIVER REPLACED BY TRANSPLANT (H): ICD-10-CM

## 2024-10-01 DIAGNOSIS — Z94.0 KIDNEY REPLACED BY TRANSPLANT: ICD-10-CM

## 2024-10-01 DIAGNOSIS — Z94.4 LIVER REPLACED BY TRANSPLANT (H): Primary | ICD-10-CM

## 2024-10-01 DIAGNOSIS — Z48.288 ENCOUNTER FOR AFTERCARE FOLLOWING MULTIPLE ORGAN TRANSPLANT: ICD-10-CM

## 2024-10-01 DIAGNOSIS — Z79.899 ENCOUNTER FOR LONG-TERM (CURRENT) USE OF MEDICATIONS: ICD-10-CM

## 2024-10-01 LAB
ALBUMIN SERPL BCG-MCNC: 4.1 G/DL (ref 3.5–5.2)
ALP SERPL-CCNC: 132 U/L (ref 40–150)
ALT SERPL W P-5'-P-CCNC: 26 U/L (ref 0–70)
ANION GAP SERPL CALCULATED.3IONS-SCNC: 11 MMOL/L (ref 7–15)
AST SERPL W P-5'-P-CCNC: 28 U/L (ref 0–45)
BASOPHILS # BLD AUTO: 0 10E3/UL (ref 0–0.2)
BASOPHILS NFR BLD AUTO: 0 %
BILIRUB DIRECT SERPL-MCNC: 0.23 MG/DL (ref 0–0.3)
BILIRUB SERPL-MCNC: 0.7 MG/DL
BUN SERPL-MCNC: 18.7 MG/DL (ref 8–23)
CALCIUM SERPL-MCNC: 9.1 MG/DL (ref 8.8–10.4)
CD3 CELLS # BLD: 930 CELLS/UL (ref 603–2990)
CD3 CELLS NFR BLD: 75 % (ref 49–84)
CD3+CD4+ CELLS # BLD: 360 CELLS/UL (ref 441–2156)
CD3+CD4+ CELLS NFR BLD: 29 % (ref 28–63)
CD3+CD4+ CELLS/CD3+CD8+ CLL BLD: 0.63 % (ref 1.4–2.6)
CD3+CD8+ CELLS # BLD: 575 CELLS/UL (ref 125–1312)
CD3+CD8+ CELLS NFR BLD: 46 % (ref 10–40)
CHLORIDE SERPL-SCNC: 99 MMOL/L (ref 98–107)
CREAT SERPL-MCNC: 1 MG/DL (ref 0.67–1.17)
EGFRCR SERPLBLD CKD-EPI 2021: 82 ML/MIN/1.73M2
EOSINOPHIL # BLD AUTO: 0.1 10E3/UL (ref 0–0.7)
EOSINOPHIL NFR BLD AUTO: 2 %
ERYTHROCYTE [DISTWIDTH] IN BLOOD BY AUTOMATED COUNT: 15 % (ref 10–15)
GLUCOSE SERPL-MCNC: 97 MG/DL (ref 70–99)
HCO3 SERPL-SCNC: 27 MMOL/L (ref 22–29)
HCT VFR BLD AUTO: 46.7 % (ref 40–53)
HGB BLD-MCNC: 14.8 G/DL (ref 13.3–17.7)
IMM GRANULOCYTES # BLD: 0.1 10E3/UL
IMM GRANULOCYTES NFR BLD: 1 %
LYMPHOCYTES # BLD AUTO: 1.3 10E3/UL (ref 0.8–5.3)
LYMPHOCYTES NFR BLD AUTO: 20 %
MCH RBC QN AUTO: 30.9 PG (ref 26.5–33)
MCHC RBC AUTO-ENTMCNC: 31.7 G/DL (ref 31.5–36.5)
MCV RBC AUTO: 98 FL (ref 78–100)
MONOCYTES # BLD AUTO: 0.5 10E3/UL (ref 0–1.3)
MONOCYTES NFR BLD AUTO: 8 %
NEUTROPHILS # BLD AUTO: 4.7 10E3/UL (ref 1.6–8.3)
NEUTROPHILS NFR BLD AUTO: 70 %
PLATELET # BLD AUTO: 165 10E3/UL (ref 150–450)
POTASSIUM SERPL-SCNC: 4.2 MMOL/L (ref 3.4–5.3)
PROT SERPL-MCNC: 6.3 G/DL (ref 6.4–8.3)
RBC # BLD AUTO: 4.79 10E6/UL (ref 4.4–5.9)
SODIUM SERPL-SCNC: 137 MMOL/L (ref 135–145)
T CELL COMMENT: ABNORMAL
TACROLIMUS BLD-MCNC: 4.4 UG/L (ref 5–15)
TME LAST DOSE: ABNORMAL H
TME LAST DOSE: ABNORMAL H
WBC # BLD AUTO: 6.6 10E3/UL (ref 4–11)

## 2024-10-01 PROCEDURE — 86360 T CELL ABSOLUTE COUNT/RATIO: CPT

## 2024-10-01 PROCEDURE — 82248 BILIRUBIN DIRECT: CPT

## 2024-10-01 PROCEDURE — 86359 T CELLS TOTAL COUNT: CPT

## 2024-10-01 PROCEDURE — 36415 COLL VENOUS BLD VENIPUNCTURE: CPT

## 2024-10-01 PROCEDURE — 80197 ASSAY OF TACROLIMUS: CPT

## 2024-10-01 PROCEDURE — 85025 COMPLETE CBC W/AUTO DIFF WBC: CPT | Mod: QW

## 2024-10-01 PROCEDURE — 80053 COMPREHEN METABOLIC PANEL: CPT

## 2024-10-02 ENCOUNTER — IMMUNIZATION (OUTPATIENT)
Dept: FAMILY MEDICINE | Facility: CLINIC | Age: 69
End: 2024-10-02
Payer: MEDICARE

## 2024-10-02 ENCOUNTER — MYC MEDICAL ADVICE (OUTPATIENT)
Dept: TRANSPLANT | Facility: CLINIC | Age: 69
End: 2024-10-02

## 2024-10-02 DIAGNOSIS — Z23 ENCOUNTER FOR IMMUNIZATION: Primary | ICD-10-CM

## 2024-10-02 LAB
BK VIRUS SPECIMEN TYPE: NORMAL
BKV DNA # SPEC NAA+PROBE: NOT DETECTED IU/ML
MAGNESIUM SERPL-MCNC: 1.9 MG/DL (ref 1.7–2.3)

## 2024-10-02 PROCEDURE — G0008 ADMIN INFLUENZA VIRUS VAC: HCPCS

## 2024-10-02 PROCEDURE — 90480 ADMN SARSCOV2 VAC 1/ONLY CMP: CPT

## 2024-10-02 PROCEDURE — 90662 IIV NO PRSV INCREASED AG IM: CPT

## 2024-10-02 PROCEDURE — 99207 PR NO CHARGE NURSE ONLY: CPT

## 2024-10-02 PROCEDURE — 91320 SARSCV2 VAC 30MCG TRS-SUC IM: CPT

## 2024-10-02 NOTE — PROGRESS NOTES
Prior to immunization administration, verified patients identity using patient s name and date of birth. Please see Immunization Activity for additional information.     Is the patient's temperature normal (100.5 or less)? Yes     Patient MEETS CRITERIA. PROCEED with vaccine administration.          10/2/2024   COVID   Have you had myocarditis or pericarditis (inflammation of or around the heart muscle) after getting a COVID-19 vaccine? No   Have you had a serious reaction to a COVID vaccine or something in a COVID vaccine, like polyethylene glycol (PEG) or polysorbate? No   Have you had multisystem inflammatory syndrome from COVID-19 in the past 90 days? No            Patient MEETS CRITERIA. PROCEED with vaccine administration.        10/2/2024   INFLUENZA   Would you like to receive the flu shot or the nasal flu vaccine today? Flu Shot   Have you had a serious reaction to a flu vaccine or something in a flu vaccine? No   Have you had Guillain-[place] syndrome within 6 weeks of getting a vaccine? No   Have you received a bone marrow transplant within the previous 6 months? No            Patient MEETS CRITERIA. PROCEED with vaccine administration.        Patient instructed to remain in clinic for 15 minutes afterwards, and to report any adverse reactions.      Link to Ancillary Visit Immunization Standing Orders SmartSet     Screening performed by Argelia Patrick LPN on 10/2/2024 at 8:45 AM.

## 2024-10-03 NOTE — TELEPHONE ENCOUNTER
Good day  Received: Yesterday  Casey Quinones, Angelita GRAVES, RN  Phone Number: [phone]     Thanks Angelita... will do!          Good day  (Newest Message First)         Thanks Angelita... will do!       You  Casey Michel9 hours ago (1:08 PM)              Good day         Your T Cell subset profile to check AbsoluteCD4 is greater than  200   You may discontinue   sulfamethoxazole-trimethoprim (BACTRIM) 400-80   at this time   Dr Marbella Marrero transplant  nephrologist ordered this test   which is done after one year post kidney   transplant

## 2024-10-04 ENCOUNTER — TELEPHONE (OUTPATIENT)
Dept: PHYSICAL MEDICINE AND REHAB | Facility: CLINIC | Age: 69
End: 2024-10-04
Payer: MEDICARE

## 2024-10-04 NOTE — TELEPHONE ENCOUNTER
Patient called because his Valium for the RFA was not yet sent. Patient's PSP is Dr. Mejia who is out today. Chart checked and looks like Tracee Robledo sent the Rx on 9/19/24. Patient advised to check with his pharmacy and if it has not been received somehow then we will sent it again.

## 2024-10-07 ENCOUNTER — RADIOLOGY INJECTION OFFICE VISIT (OUTPATIENT)
Dept: PHYSICAL MEDICINE AND REHAB | Facility: CLINIC | Age: 69
End: 2024-10-07
Attending: PHYSICAL MEDICINE & REHABILITATION
Payer: MEDICARE

## 2024-10-07 VITALS
HEIGHT: 68 IN | OXYGEN SATURATION: 98 % | DIASTOLIC BLOOD PRESSURE: 82 MMHG | SYSTOLIC BLOOD PRESSURE: 138 MMHG | BODY MASS INDEX: 34.1 KG/M2 | RESPIRATION RATE: 16 BRPM | HEART RATE: 78 BPM | TEMPERATURE: 97.9 F | WEIGHT: 225 LBS

## 2024-10-07 DIAGNOSIS — M47.816 SPONDYLOSIS OF LUMBAR REGION WITHOUT MYELOPATHY OR RADICULOPATHY: ICD-10-CM

## 2024-10-07 PROCEDURE — 64635 DESTROY LUMB/SAC FACET JNT: CPT | Mod: 50 | Performed by: STUDENT IN AN ORGANIZED HEALTH CARE EDUCATION/TRAINING PROGRAM

## 2024-10-07 PROCEDURE — 64636 DESTROY L/S FACET JNT ADDL: CPT | Mod: 50 | Performed by: STUDENT IN AN ORGANIZED HEALTH CARE EDUCATION/TRAINING PROGRAM

## 2024-10-07 RX ORDER — BUPIVACAINE HYDROCHLORIDE 2.5 MG/ML
INJECTION, SOLUTION EPIDURAL; INFILTRATION; INTRACAUDAL
Status: COMPLETED | OUTPATIENT
Start: 2024-10-07 | End: 2024-10-07

## 2024-10-07 RX ORDER — LIDOCAINE HYDROCHLORIDE 10 MG/ML
INJECTION, SOLUTION EPIDURAL; INFILTRATION; INTRACAUDAL; PERINEURAL
Status: COMPLETED | OUTPATIENT
Start: 2024-10-07 | End: 2024-10-07

## 2024-10-07 RX ADMIN — LIDOCAINE HYDROCHLORIDE 6 ML: 10 INJECTION, SOLUTION EPIDURAL; INFILTRATION; INTRACAUDAL; PERINEURAL at 08:33

## 2024-10-07 RX ADMIN — BUPIVACAINE HYDROCHLORIDE 6 ML: 2.5 INJECTION, SOLUTION EPIDURAL; INFILTRATION; INTRACAUDAL at 08:36

## 2024-10-07 ASSESSMENT — PAIN SCALES - GENERAL
PAINLEVEL: NO PAIN (0)
PAINLEVEL: EXTREME PAIN (8)

## 2024-10-07 NOTE — PATIENT INSTRUCTIONS
Follow-up visit with Dr. Mejia in 6 weeks to discuss procedures outcome and determine care plan going forward.      DISCHARGE INSTRUCTIONS    During office hours (8:00 a.m.-4:00 p.m.) questions or concerns may be answered  by calling [facility] Navigation Nurses at  [phone].  Messages received after hours will be returned the following business day.      In the case of an emergency,please dial 911 or seek assistance at the nearest Emergency Room/Urgent Care facility.     All Patients:  You may experience an increase in your symptoms for the first 5-7 days. Soreness may persist during the first few weeks as it takes 4-6 weeks for the nerves to fully heal.    You may use ice on the injection site,as frequently as 20 minutes each hour if needed. It is recommended NOT to apply heat during the first 24 hours.    You may take your pain medicine.    You may continue taking your regular medication.    You may shower. No swimming, tub bath or hot tub for 48hours. This also includes no lotions, ointments or patches to the needle sites as well. You may remove your bandaid/bandage as soon as you are home.    You mayresume light activities, as tolerated.    Resume your usual diet as tolerated.    It is strongly advised that you do not drive for 1-3 hours post injection.    If you have had oral sedation:  Do not drive for 8 hours post injection.             POSSIBLE PROCEDURE SIDE EFFECTS    -Call the Spine Center if you are concerned    IncreasedPain           Increased numbness/tingling      Nausea/Vomiting          Bruising/bleeding at site      Redness or swelling  Difficulty walking      Weakness          Fever greater than 100.5

## 2024-11-05 ENCOUNTER — LAB (OUTPATIENT)
Dept: LAB | Facility: CLINIC | Age: 69
End: 2024-11-05
Payer: COMMERCIAL

## 2024-11-05 DIAGNOSIS — E66.01 CLASS 2 SEVERE OBESITY DUE TO EXCESS CALORIES WITH SERIOUS COMORBIDITY IN ADULT (H): Primary | ICD-10-CM

## 2024-11-05 DIAGNOSIS — Z94.0 KIDNEY REPLACED BY TRANSPLANT: ICD-10-CM

## 2024-11-05 DIAGNOSIS — E66.812 CLASS 2 SEVERE OBESITY DUE TO EXCESS CALORIES WITH SERIOUS COMORBIDITY IN ADULT (H): Primary | ICD-10-CM

## 2024-11-05 DIAGNOSIS — Z79.899 ENCOUNTER FOR LONG-TERM (CURRENT) USE OF MEDICATIONS: ICD-10-CM

## 2024-11-05 DIAGNOSIS — Z48.288 ENCOUNTER FOR AFTERCARE FOLLOWING MULTIPLE ORGAN TRANSPLANT: ICD-10-CM

## 2024-11-05 DIAGNOSIS — Z94.4 LIVER REPLACED BY TRANSPLANT (H): ICD-10-CM

## 2024-11-05 LAB
ALBUMIN SERPL BCG-MCNC: 3.9 G/DL (ref 3.5–5.2)
ALP SERPL-CCNC: 143 U/L (ref 40–150)
ALT SERPL W P-5'-P-CCNC: 28 U/L (ref 0–70)
ANION GAP SERPL CALCULATED.3IONS-SCNC: 12 MMOL/L (ref 7–15)
AST SERPL W P-5'-P-CCNC: 30 U/L (ref 0–45)
BASOPHILS # BLD AUTO: 0 10E3/UL (ref 0–0.2)
BASOPHILS NFR BLD AUTO: 0 %
BILIRUB DIRECT SERPL-MCNC: <0.2 MG/DL (ref 0–0.3)
BILIRUB SERPL-MCNC: 0.7 MG/DL
BUN SERPL-MCNC: 12.2 MG/DL (ref 8–23)
CALCIUM SERPL-MCNC: 9 MG/DL (ref 8.8–10.4)
CHLORIDE SERPL-SCNC: 104 MMOL/L (ref 98–107)
CREAT SERPL-MCNC: 0.92 MG/DL (ref 0.67–1.17)
EGFRCR SERPLBLD CKD-EPI 2021: >90 ML/MIN/1.73M2
EOSINOPHIL # BLD AUTO: 0.1 10E3/UL (ref 0–0.7)
EOSINOPHIL NFR BLD AUTO: 2 %
ERYTHROCYTE [DISTWIDTH] IN BLOOD BY AUTOMATED COUNT: 14.2 % (ref 10–15)
EST. AVERAGE GLUCOSE BLD GHB EST-MCNC: 108 MG/DL
GLUCOSE SERPL-MCNC: 92 MG/DL (ref 70–99)
HBA1C MFR BLD: 5.4 % (ref 0–5.6)
HCO3 SERPL-SCNC: 26 MMOL/L (ref 22–29)
HCT VFR BLD AUTO: 45.4 % (ref 40–53)
HGB BLD-MCNC: 14.7 G/DL (ref 13.3–17.7)
IMM GRANULOCYTES # BLD: 0.1 10E3/UL
IMM GRANULOCYTES NFR BLD: 1 %
LYMPHOCYTES # BLD AUTO: 1.2 10E3/UL (ref 0.8–5.3)
LYMPHOCYTES NFR BLD AUTO: 18 %
MCH RBC QN AUTO: 31.3 PG (ref 26.5–33)
MCHC RBC AUTO-ENTMCNC: 32.4 G/DL (ref 31.5–36.5)
MCV RBC AUTO: 97 FL (ref 78–100)
MONOCYTES # BLD AUTO: 0.5 10E3/UL (ref 0–1.3)
MONOCYTES NFR BLD AUTO: 8 %
NEUTROPHILS # BLD AUTO: 4.7 10E3/UL (ref 1.6–8.3)
NEUTROPHILS NFR BLD AUTO: 71 %
PLATELET # BLD AUTO: 163 10E3/UL (ref 150–450)
POTASSIUM SERPL-SCNC: 4.2 MMOL/L (ref 3.4–5.3)
PROT SERPL-MCNC: 6.3 G/DL (ref 6.4–8.3)
RBC # BLD AUTO: 4.7 10E6/UL (ref 4.4–5.9)
SODIUM SERPL-SCNC: 142 MMOL/L (ref 135–145)
TACROLIMUS BLD-MCNC: 4 UG/L (ref 5–15)
TME LAST DOSE: ABNORMAL H
TME LAST DOSE: ABNORMAL H
WBC # BLD AUTO: 6.6 10E3/UL (ref 4–11)

## 2024-11-05 PROCEDURE — 80053 COMPREHEN METABOLIC PANEL: CPT

## 2024-11-05 PROCEDURE — 36415 COLL VENOUS BLD VENIPUNCTURE: CPT

## 2024-11-05 PROCEDURE — 80197 ASSAY OF TACROLIMUS: CPT

## 2024-11-05 PROCEDURE — 82248 BILIRUBIN DIRECT: CPT

## 2024-11-05 PROCEDURE — 87799 DETECT AGENT NOS DNA QUANT: CPT

## 2024-11-05 PROCEDURE — 85025 COMPLETE CBC W/AUTO DIFF WBC: CPT | Mod: QW

## 2024-11-05 PROCEDURE — 83036 HEMOGLOBIN GLYCOSYLATED A1C: CPT | Mod: GA

## 2024-11-07 LAB
BK VIRUS SPECIMEN TYPE: NORMAL
BKV DNA # SPEC NAA+PROBE: NOT DETECTED IU/ML

## 2024-11-12 ENCOUNTER — TELEPHONE (OUTPATIENT)
Dept: GASTROENTEROLOGY | Facility: CLINIC | Age: 69
End: 2024-11-12
Payer: MEDICARE

## 2024-11-12 DIAGNOSIS — I15.1 HTN, KIDNEY TRANSPLANT RELATED: ICD-10-CM

## 2024-11-12 DIAGNOSIS — Z94.0 HTN, KIDNEY TRANSPLANT RELATED: ICD-10-CM

## 2024-11-12 NOTE — TELEPHONE ENCOUNTER
[facility] Prior Authorization Team   Phone: [phone]  Fax: [phone]    PA Initiation    Medication: TACROLIMUS (GENERIC EQUIVALENT) 0.5 MG PO CAPS  Insurance Company: [employer] - Phone [phone] Fax [phone]  Pharmacy Filling the Rx: [facility] - [place] [address]  Filling Pharmacy Phone: [phone]  Filling Pharmacy Fax: [phone]  Start Date: 11/12/2024

## 2024-11-12 NOTE — TELEPHONE ENCOUNTER
[facility] Prior Authorization Team   Phone: [phone]  Fax: [phone]    PA Initiation    Medication: MYCOPHENOLIC ACID (GENERIC EQUIV) 180 MG PO TBEC  Insurance Company: [employer] - Phone [phone] Fax [phone]  Pharmacy Filling the Rx: [facility] - [place], [place] - [facility] [address]  Filling Pharmacy Phone: [phone]  Filling Pharmacy Fax: [phone]  Start Date: 11/12/2024

## 2024-11-18 NOTE — PROGRESS NOTES
Assessment/Plan:      Casey was seen today for back pain.    Diagnoses and all orders for this visit:    Spondylosis of lumbar region without myelopathy or radiculopathy    Lumbar facet arthropathy    Sacroiliac joint pain    Myofascial pain    Foraminal stenosis of lumbar region    Stenosis of lateral recess of lumbar spine    DISH (diffuse idiopathic skeletal hyperostosis)         Assessment: Pleasant 69 year old male with history of coronary artery disease, hypertension, hypothyroid, Hepatocellular carcinoma status post liver and kidney transplant 1 year ago with:     1.  Persistent chronic lumbar spine pain at the lumbosacral junction worse first thing in the morning better throughout the day and worse at night consistent with facet arthropathy.  Severe facet arthropathy L5-S1 bilaterally.  Positive facet loading on exam today and now only having axial low back pain with some left gluteal pain intermittently.  Gluteal pain likely referred from the facets.  Pain had improved with physical therapy and returned.  Approximately 90% improvement following bilateral L3, 4, 5 medial branch radiofrequency ablation     2.  Left-sided low back pain at the PSIS and sacroiliac joint consistent with sacroiliac joint pain.  Positive thigh thrust Gaenslen's and Jose Roberto test on the left today.  Relatively mild compared to his previous low back pain    4.  Previous left lower extremity radicular pain.  He has multilevel degenerative disc disease through the lumbar spine with severe left foraminal stenosis at L5-S1 and lateral recess stenosis with facet arthropathy and facet joint edema.  EMG findings positive for left S1 radiculopathy I also question if the L5 nerve is involved given the MRI findings.  Radicular pain has dramatically improved/resolved following physical therapy and doing home exercises.  Doing well.     5.  Myofascial pain lumbar spine.       6.  Diffuse idiopathic skeletal hyperostosis with ankylosis of the L4-5  level as well as much of the upper lumbar spine and lower thoracic spine.    7.  Paresthesias in the feet consistent with peripheral polyneuropathy.  On gabapentin  Per PCP.            Discussion:    1.  Overall he is doing quite well following the lumbar radiofrequency ablation.  Has been relatively less active currently but still feels good in the back with the exception of the left sacroiliac joint pain.  We discussed options of home exercise along with the injections in the sacroiliac joint.  At this point he is going to monitor his lumbar spine pain and return to his physical therapy exercises and being active.    2.  I did provide pelvic isometrics and other gentle SI range of motion exercises today.    3.  Follow-up with me as needed if symptoms do not improve   Or if his lumbar spine pain from facet arthropathy worsens again.      It was our pleasure caring for your patient today, if there any questions or concerns please do not hesitate to contact us.      Subjective:   Patient ID: Damion Quinones is a 69 year old male.    History of Present Illness: Patient presents for follow-up of radiofrequency ablation for lumbar spine.  Overall 90% improvement of the low back pain doing quite well with regards to the back pain in general.  Radicular pain remains resolved.  Still has some back pain over the left PSIS and sacroiliac joint region worse with laying in bed at night.  He is to start at night in the recliner and then after about 4 hours gets up and goes to bed.  Better with moving around.  Takes Tylenol as needed for pain.  Overall quite pleased with his progress from the RF but still having the left-sided pain.  Pain is a 1/10 today.     Imaging: CT chest abdomen pelvis reviewed evaluate the lumbar anatomy.  Show slight retrolisthesis L4 and L5.  Severe facet arthropathy L4-5 L5-S1.  Moderate severe facet arthropathy L4-L5-S1 vacuum disc phenomenon L2-3.  Anterior osteophytes diffusely throughout the lower  thoracic spine upper lumbar spine consistent with DISH.  Likely bridging osteophyte completed at L4-5.    Review of Systems: Pertinent positives: Paresthesias.  Pertinent negatives: No weakness.  No bowel or bladder incontinence.  No urinary retention.  No fevers, unintentional weight loss, balance changes, headaches, frequent falling, difficulty swallowing, or coordination difficulties.  All others reviewed are negative.         Current Outpatient Medications   Medication Sig Dispense Refill    Alcohol Swabs PADS Use to swab the area of the injection or quyen as directed Per insurance coverage 100 each 0    allopurinol (ZYLOPRIM) 100 MG tablet Take 100 mg by mouth daily      allopurinol (ZYLOPRIM) 300 MG tablet Take 300 mg by mouth daily 300 mg and 100 mg total dose 400 mg daily      anakinra (KINERET) 100 MG/0.67ML SOSY injection Inject 100 mg Subcutaneous twice a week      apixaban ANTICOAGULANT (ELIQUIS ANTICOAGULANT) 5 MG tablet Take 1 tablet (5 mg) by mouth 2 times daily. 180 tablet 3    atorvastatin (LIPITOR) 40 MG tablet Take 1 tablet (40 mg) by mouth every evening 90 tablet 3    Blood Glucose Monitoring Suppl (ONETOUCH VERIO REFLECT) w/Device KIT       capsaicin (ZOSTRIX) 0.025 % external cream APPLY TOPICALLY TO FEET TWICE DAILY PRN 60 g 1    clotrimazole (LOTRIMIN) 1 % external cream Apply topically 2 times daily For feet (Patient taking differently: Apply topically as needed. For feet) 60 g 11    diazepam (VALIUM) 5 MG tablet Valium 5 mg p.o., take 1 tablet 2 hours prior to injection, second tablet if needed 60 minutes prior.  Must have . 2 tablet 0    furosemide (LASIX) 20 MG tablet TAKE 1 TABLET(20 MG) BY MOUTH DAILY 90 tablet 3    gabapentin (NEURONTIN) 100 MG capsule Take 1 capsule (100 mg) by mouth every morning AND 1 capsule (100 mg) daily (with lunch) AND 3 capsules (300 mg) every evening. 100 mg in am, 100 mg in the afternoon and 200 mg at bedtime by mouth daily and 100mg at midnight if not  sleeping 150 capsule 11    levothyroxine (SYNTHROID/LEVOTHROID) 88 MCG tablet Take 1 tablet (88 mcg) by mouth daily Takes in the middle of the night daily 90 tablet 3    magnesium oxide (MAG-OX) 400 MG tablet Take 2 tablets (800 mg) by mouth 2 times daily 60 tablet 0    metoprolol tartrate (LOPRESSOR) 50 MG tablet Take 1 tablet (50 mg) by mouth 2 times daily 180 tablet 3    multivitamin w/minerals (THERA-VIT-M) tablet Take 1 tablet by mouth daily 30 tablet 0    mupirocin (BACTROBAN) 2 % external ointment Apply topically 2 times daily (Patient taking differently: Apply topically as needed.) 60 g 3    MYFORTIC (BRAND) 180 MG EC tablet Take 3 tablets (540 mg) by mouth 2 times daily 540 tablet 3    pantoprazole (PROTONIX) 20 MG EC tablet Take 1 tablet (20 mg) by mouth every other day 45 tablet 3    predniSONE (DELTASONE) 5 MG tablet Take 1 tablet (5 mg) by mouth daily 90 tablet 3    Sharps Container MISC Use as directed to dispose of needles, lancets and other sharps 1 each 0    sildenafil (VIAGRA) 50 MG tablet Take 1 tablet (50 mg) by mouth daily as needed (ED). Take 1 tablet (50 mg) by mouth daily as needed (1 hour before sexual activity) 20 tablet 5    simethicone (MYLICON) 125 MG chewable tablet Take 125 mg by mouth 4 times daily as needed for intestinal gas      tacrolimus (GENERIC EQUIVALENT) 0.5 MG capsule Take 1 capsule (0.5 mg) by mouth 2 times daily 180 capsule 3    triamcinolone (KENALOG) 0.1 % external ointment Apply topically 2 times daily (Patient taking differently: Apply topically as needed.) 454 g 3    ursodiol (ACTIGALL) 250 MG tablet Take 1 tablet (250 mg) by mouth 2 times daily 180 tablet 3     No current facility-administered medications for this visit.       Past Medical History:   Diagnosis Date    ANCA-associated vasculitis (H) 2022    Antiplatelet or antithrombotic long-term use     Avascular necrosis (H)     Left femoral head    C. difficile colitis     Coronary artery disease     [facility] 4/2023 -  complete occlusion of RCA    Gout     HCC (hepatocellular carcinoma) (H) 09/05/2023    History of hemochromatosis 10/11/2019    Hypertension     Hypothyroidism 12/19/2023    IgA nephropathy     Obesity     PAF (paroxysmal atrial fibrillation) (H)     Pre-diabetes 2023    Psoriatic arthritis (H)     RA (rheumatoid arthritis) (H)     Status post kidney transplant 06/06/2023    Induction with thymoglobulin 4mg/kg, + DSA CW9    Status post liver transplantation (H) 06/06/2023    for hx of alcohol related cirrhosis       The following portions of the patient's history were reviewed and updated as appropriate: allergies, current medications, past family history, past medical history, past social history, past surgical history and problem list.           Objective:   Physical Exam:    /83   Pulse 81   There is no height or weight on file to calculate BMI.      General: Alert and oriented with normal affect. Attention, knowledge, memory, and language are intact. No acute distress.   Eyes: Sclerae are clear.  Respirations: Unlabored.     Gait:  Nonantalgic  Tenderness over the left PSIS and sacroiliac joint.  Left-sided SI pain with thigh thrust, Jose Roberto test and Gaenslen's on the left.  Sensation is intact to light touch throughout the lower extremities.  Reflexes are 1+ patellar and 0 Achilles      Manual muscle testing reveals:  Right /Left out of 5     5/5 hip flexors  5/5 knee flexors  5/5 knee extensors  5/5 ankle plantar flexors  5/5 ankle dorsiflexors  5/5  EHL

## 2024-11-19 ENCOUNTER — OFFICE VISIT (OUTPATIENT)
Dept: PHYSICAL MEDICINE AND REHAB | Facility: CLINIC | Age: 69
End: 2024-11-19
Payer: MEDICARE

## 2024-11-19 VITALS — SYSTOLIC BLOOD PRESSURE: 131 MMHG | HEART RATE: 81 BPM | DIASTOLIC BLOOD PRESSURE: 83 MMHG

## 2024-11-19 DIAGNOSIS — M79.18 MYOFASCIAL PAIN: ICD-10-CM

## 2024-11-19 DIAGNOSIS — M53.3 SACROILIAC JOINT PAIN: ICD-10-CM

## 2024-11-19 DIAGNOSIS — M48.10 DISH (DIFFUSE IDIOPATHIC SKELETAL HYPEROSTOSIS): ICD-10-CM

## 2024-11-19 DIAGNOSIS — Z94.0 HTN, KIDNEY TRANSPLANT RELATED: ICD-10-CM

## 2024-11-19 DIAGNOSIS — M48.061 FORAMINAL STENOSIS OF LUMBAR REGION: ICD-10-CM

## 2024-11-19 DIAGNOSIS — M47.816 SPONDYLOSIS OF LUMBAR REGION WITHOUT MYELOPATHY OR RADICULOPATHY: Primary | ICD-10-CM

## 2024-11-19 DIAGNOSIS — M47.816 LUMBAR FACET ARTHROPATHY: ICD-10-CM

## 2024-11-19 DIAGNOSIS — M48.061 STENOSIS OF LATERAL RECESS OF LUMBAR SPINE: ICD-10-CM

## 2024-11-19 DIAGNOSIS — I15.1 HTN, KIDNEY TRANSPLANT RELATED: ICD-10-CM

## 2024-11-19 DIAGNOSIS — Z94.4 LIVER TRANSPLANT RECIPIENT (H): ICD-10-CM

## 2024-11-19 RX ORDER — MYCOPHENOLIC ACID 180 MG/1
540 TABLET, DELAYED RELEASE ORAL 2 TIMES DAILY
Qty: 540 TABLET | Refills: 3 | Status: SHIPPED | OUTPATIENT
Start: 2024-11-19

## 2024-11-19 ASSESSMENT — PAIN SCALES - GENERAL: PAINLEVEL_OUTOF10: NO PAIN (1)

## 2024-11-19 NOTE — LETTER
11/19/2024      Damion Quinones   [id number] [place] [facility] [address]      Dear Colleague,    Thank you for referring your patient, Damion Quinones, to the [facility]. Please see a copy of my visit note below.    Assessment/Plan:      Casey was seen today for back pain.    Diagnoses and all orders for this visit:    Spondylosis of lumbar region without myelopathy or radiculopathy    Lumbar facet arthropathy    Sacroiliac joint pain    Myofascial pain    Foraminal stenosis of lumbar region    Stenosis of lateral recess of lumbar spine    DISH (diffuse idiopathic skeletal hyperostosis)         Assessment: Pleasant 69 year old male with history of coronary artery disease, hypertension, hypothyroid, Hepatocellular carcinoma status post liver and kidney transplant 1 year ago with:     1.  Persistent chronic lumbar spine pain at the lumbosacral junction worse first thing in the morning better throughout the day and worse at night consistent with facet arthropathy.  Severe facet arthropathy L5-S1 bilaterally.  Positive facet loading on exam today and now only having axial low back pain with some left gluteal pain intermittently.  Gluteal pain likely referred from the facets.  Pain had improved with physical therapy and returned.  Approximately 90% improvement following bilateral L3, 4, 5 medial branch radiofrequency ablation     2.  Left-sided low back pain at the PSIS and sacroiliac joint consistent with sacroiliac joint pain.  Positive thigh thrust Gaenslen's and Jose Roberto test on the left today.  Relatively mild compared to his previous low back pain    4.  Previous left lower extremity radicular pain.  He has multilevel degenerative disc disease through the lumbar spine with severe left foraminal stenosis at L5-S1 and lateral recess stenosis with facet arthropathy and facet joint edema.  EMG findings positive for left S1 radiculopathy I also question if the L5 nerve is involved given the MRI  findings.  Radicular pain has dramatically improved/resolved following physical therapy and doing home exercises.  Doing well.     5.  Myofascial pain lumbar spine.       6.  Diffuse idiopathic skeletal hyperostosis with ankylosis of the L4-5 level as well as much of the upper lumbar spine and lower thoracic spine.    7.  Paresthesias in the feet consistent with peripheral polyneuropathy.  On gabapentin  Per PCP.            Discussion:    1.  Overall he is doing quite well following the lumbar radiofrequency ablation.  Has been relatively less active currently but still feels good in the back with the exception of the left sacroiliac joint pain.  We discussed options of home exercise along with the injections in the sacroiliac joint.  At this point he is going to monitor his lumbar spine pain and return to his physical therapy exercises and being active.    2.  I did provide pelvic isometrics and other gentle SI range of motion exercises today.    3.  Follow-up with me as needed if symptoms do not improve   Or if his lumbar spine pain from facet arthropathy worsens again.      It was our pleasure caring for your patient today, if there any questions or concerns please do not hesitate to contact us.      Subjective:   Patient ID: Damion Quinones is a 69 year old male.    History of Present Illness: Patient presents for follow-up of radiofrequency ablation for lumbar spine.  Overall 90% improvement of the low back pain doing quite well with regards to the back pain in general.  Radicular pain remains resolved.  Still has some back pain over the left PSIS and sacroiliac joint region worse with laying in bed at night.  He is to start at night in the recliner and then after about 4 hours gets up and goes to bed.  Better with moving around.  Takes Tylenol as needed for pain.  Overall quite pleased with his progress from the RF but still having the left-sided pain.  Pain is a 1/10 today.     Imaging: CT chest abdomen pelvis  reviewed evaluate the lumbar anatomy.  Show slight retrolisthesis L4 and L5.  Severe facet arthropathy L4-5 L5-S1.  Moderate severe facet arthropathy L4-L5-S1 vacuum disc phenomenon L2-3.  Anterior osteophytes diffusely throughout the lower thoracic spine upper lumbar spine consistent with DISH.  Likely bridging osteophyte completed at L4-5.    Review of Systems: Pertinent positives: Paresthesias.  Pertinent negatives: No weakness.  No bowel or bladder incontinence.  No urinary retention.  No fevers, unintentional weight loss, balance changes, headaches, frequent falling, difficulty swallowing, or coordination difficulties.  All others reviewed are negative.         Current Outpatient Medications   Medication Sig Dispense Refill     Alcohol Swabs PADS Use to swab the area of the injection or quyen as directed Per insurance coverage 100 each 0     allopurinol (ZYLOPRIM) 100 MG tablet Take 100 mg by mouth daily       allopurinol (ZYLOPRIM) 300 MG tablet Take 300 mg by mouth daily 300 mg and 100 mg total dose 400 mg daily       anakinra (KINERET) 100 MG/0.67ML SOSY injection Inject 100 mg Subcutaneous twice a week       apixaban ANTICOAGULANT (ELIQUIS ANTICOAGULANT) 5 MG tablet Take 1 tablet (5 mg) by mouth 2 times daily. 180 tablet 3     atorvastatin (LIPITOR) 40 MG tablet Take 1 tablet (40 mg) by mouth every evening 90 tablet 3     Blood Glucose Monitoring Suppl (ONETOUCH VERIO REFLECT) w/Device KIT        capsaicin (ZOSTRIX) 0.025 % external cream APPLY TOPICALLY TO FEET TWICE DAILY PRN 60 g 1     clotrimazole (LOTRIMIN) 1 % external cream Apply topically 2 times daily For feet (Patient taking differently: Apply topically as needed. For feet) 60 g 11     diazepam (VALIUM) 5 MG tablet Valium 5 mg p.o., take 1 tablet 2 hours prior to injection, second tablet if needed 60 minutes prior.  Must have . 2 tablet 0     furosemide (LASIX) 20 MG tablet TAKE 1 TABLET(20 MG) BY MOUTH DAILY 90 tablet 3     gabapentin  (NEURONTIN) 100 MG capsule Take 1 capsule (100 mg) by mouth every morning AND 1 capsule (100 mg) daily (with lunch) AND 3 capsules (300 mg) every evening. 100 mg in am, 100 mg in the afternoon and 200 mg at bedtime by mouth daily and 100mg at midnight if not sleeping 150 capsule 11     levothyroxine (SYNTHROID/LEVOTHROID) 88 MCG tablet Take 1 tablet (88 mcg) by mouth daily Takes in the middle of the night daily 90 tablet 3     magnesium oxide (MAG-OX) 400 MG tablet Take 2 tablets (800 mg) by mouth 2 times daily 60 tablet 0     metoprolol tartrate (LOPRESSOR) 50 MG tablet Take 1 tablet (50 mg) by mouth 2 times daily 180 tablet 3     multivitamin w/minerals (THERA-VIT-M) tablet Take 1 tablet by mouth daily 30 tablet 0     mupirocin (BACTROBAN) 2 % external ointment Apply topically 2 times daily (Patient taking differently: Apply topically as needed.) 60 g 3     MYFORTIC (BRAND) 180 MG EC tablet Take 3 tablets (540 mg) by mouth 2 times daily 540 tablet 3     pantoprazole (PROTONIX) 20 MG EC tablet Take 1 tablet (20 mg) by mouth every other day 45 tablet 3     predniSONE (DELTASONE) 5 MG tablet Take 1 tablet (5 mg) by mouth daily 90 tablet 3     Sharps Container MISC Use as directed to dispose of needles, lancets and other sharps 1 each 0     sildenafil (VIAGRA) 50 MG tablet Take 1 tablet (50 mg) by mouth daily as needed (ED). Take 1 tablet (50 mg) by mouth daily as needed (1 hour before sexual activity) 20 tablet 5     simethicone (MYLICON) 125 MG chewable tablet Take 125 mg by mouth 4 times daily as needed for intestinal gas       tacrolimus (GENERIC EQUIVALENT) 0.5 MG capsule Take 1 capsule (0.5 mg) by mouth 2 times daily 180 capsule 3     triamcinolone (KENALOG) 0.1 % external ointment Apply topically 2 times daily (Patient taking differently: Apply topically as needed.) 454 g 3     ursodiol (ACTIGALL) 250 MG tablet Take 1 tablet (250 mg) by mouth 2 times daily 180 tablet 3     No current facility-administered  medications for this visit.       Past Medical History:   Diagnosis Date     ANCA-associated vasculitis (H) 2022     Antiplatelet or antithrombotic long-term use      Avascular necrosis (H)     Left femoral head     C. difficile colitis      Coronary artery disease     [facility] 4/2023 - complete occlusion of RCA     Gout      HCC (hepatocellular carcinoma) (H) 09/05/2023     History of hemochromatosis 10/11/2019     Hypertension      Hypothyroidism 12/19/2023     IgA nephropathy      Obesity      PAF (paroxysmal atrial fibrillation) (H)      Pre-diabetes 2023     Psoriatic arthritis (H)      RA (rheumatoid arthritis) (H)      Status post kidney transplant 06/06/2023    Induction with thymoglobulin 4mg/kg, + DSA CW9     Status post liver transplantation (H) 06/06/2023    for hx of alcohol related cirrhosis       The following portions of the patient's history were reviewed and updated as appropriate: allergies, current medications, past family history, past medical history, past social history, past surgical history and problem list.           Objective:   Physical Exam:    /83   Pulse 81   There is no height or weight on file to calculate BMI.      General: Alert and oriented with normal affect. Attention, knowledge, memory, and language are intact. No acute distress.   Eyes: Sclerae are clear.  Respirations: Unlabored.     Gait:  Nonantalgic  Tenderness over the left PSIS and sacroiliac joint.  Left-sided SI pain with thigh thrust, Jose Roberto test and Gaenslen's on the left.  Sensation is intact to light touch throughout the lower extremities.  Reflexes are 1+ patellar and 0 Achilles      Manual muscle testing reveals:  Right /Left out of 5     5/5 hip flexors  5/5 knee flexors  5/5 knee extensors  5/5 ankle plantar flexors  5/5 ankle dorsiflexors  5/5  EHL       Again, thank you for allowing me to participate in the care of your patient.        Sincerely,        Abdulaziz Mejia, DO

## 2024-11-19 NOTE — TELEPHONE ENCOUNTER
Medication Refill  Route to [facility]    Pharmacy Name:   [facility] - [place], [place] - [address] Phone: [phone]   Fax: [phone]          Name of Medication:   mycophenolic acid  MYFORTIC (BRAND) 180 MG EC tablet      Quantity / Dose: 540 / 180MG

## 2024-12-02 ENCOUNTER — TELEPHONE (OUTPATIENT)
Dept: CARDIOLOGY | Facility: CLINIC | Age: 69
End: 2024-12-02
Payer: MEDICARE

## 2024-12-03 NOTE — TELEPHONE ENCOUNTER
Spoke with Pt regarding recommendations- Pt will discuss with his insurance carrier and pharmacy prior to switching to sildenafil. He noted he does not wish to be on this medication. He will contact writer with update    From: Phillip Marrero MD  Sent: 12/2/2024   4:55 PM CST  To: Salena David    None of the alternatives listed are acceptable. Pt can cover costs or change to sildenafil.  JKH  ----- Message -----  From: Salena David  Sent: 12/2/2024   1:13 PM CST  To: Phillip Marrero MD    ----- Message from Salena David sent at 12/2/2024  1:13 PM CST -----  Hi Dr. Marrero,    Please see encounter and advise on alternative. Thanks-Salena

## 2024-12-04 ENCOUNTER — LAB (OUTPATIENT)
Dept: LAB | Facility: CLINIC | Age: 69
End: 2024-12-04
Payer: MEDICARE

## 2024-12-04 ENCOUNTER — ALLIED HEALTH/NURSE VISIT (OUTPATIENT)
Dept: FAMILY MEDICINE | Facility: CLINIC | Age: 69
End: 2024-12-04
Payer: MEDICARE

## 2024-12-04 DIAGNOSIS — Z23 NEED FOR PROPHYLACTIC VACCINATION AGAINST HEPATITIS B VIRUS: ICD-10-CM

## 2024-12-04 DIAGNOSIS — Z94.4 LIVER REPLACED BY TRANSPLANT (H): ICD-10-CM

## 2024-12-04 DIAGNOSIS — Z23 NEED FOR VACCINATION: Primary | ICD-10-CM

## 2024-12-04 DIAGNOSIS — Z79.899 ENCOUNTER FOR LONG-TERM (CURRENT) USE OF MEDICATIONS: ICD-10-CM

## 2024-12-04 DIAGNOSIS — Z48.288 ENCOUNTER FOR AFTERCARE FOLLOWING MULTIPLE ORGAN TRANSPLANT: ICD-10-CM

## 2024-12-04 DIAGNOSIS — Z94.0 KIDNEY REPLACED BY TRANSPLANT: ICD-10-CM

## 2024-12-04 LAB
ALBUMIN SERPL BCG-MCNC: 4.1 G/DL (ref 3.5–5.2)
ALP SERPL-CCNC: 153 U/L (ref 40–150)
ALT SERPL W P-5'-P-CCNC: 43 U/L (ref 0–70)
ANION GAP SERPL CALCULATED.3IONS-SCNC: 12 MMOL/L (ref 7–15)
AST SERPL W P-5'-P-CCNC: 44 U/L (ref 0–45)
BASOPHILS # BLD AUTO: 0 10E3/UL (ref 0–0.2)
BASOPHILS NFR BLD AUTO: 1 %
BILIRUB DIRECT SERPL-MCNC: 0.3 MG/DL (ref 0–0.3)
BILIRUB SERPL-MCNC: 1 MG/DL
BUN SERPL-MCNC: 14.3 MG/DL (ref 8–23)
CALCIUM SERPL-MCNC: 9.5 MG/DL (ref 8.8–10.4)
CHLORIDE SERPL-SCNC: 97 MMOL/L (ref 98–107)
CREAT SERPL-MCNC: 1.03 MG/DL (ref 0.67–1.17)
EGFRCR SERPLBLD CKD-EPI 2021: 80 ML/MIN/1.73M2
EOSINOPHIL # BLD AUTO: 0.1 10E3/UL (ref 0–0.7)
EOSINOPHIL NFR BLD AUTO: 2 %
ERYTHROCYTE [DISTWIDTH] IN BLOOD BY AUTOMATED COUNT: 13.9 % (ref 10–15)
GLUCOSE SERPL-MCNC: 99 MG/DL (ref 70–99)
HCO3 SERPL-SCNC: 30 MMOL/L (ref 22–29)
HCT VFR BLD AUTO: 46.7 % (ref 40–53)
HGB BLD-MCNC: 15.5 G/DL (ref 13.3–17.7)
IMM GRANULOCYTES # BLD: 0.1 10E3/UL
IMM GRANULOCYTES NFR BLD: 1 %
LYMPHOCYTES # BLD AUTO: 1.4 10E3/UL (ref 0.8–5.3)
LYMPHOCYTES NFR BLD AUTO: 20 %
MCH RBC QN AUTO: 32.1 PG (ref 26.5–33)
MCHC RBC AUTO-ENTMCNC: 33.2 G/DL (ref 31.5–36.5)
MCV RBC AUTO: 97 FL (ref 78–100)
MONOCYTES # BLD AUTO: 0.6 10E3/UL (ref 0–1.3)
MONOCYTES NFR BLD AUTO: 9 %
NEUTROPHILS # BLD AUTO: 4.6 10E3/UL (ref 1.6–8.3)
NEUTROPHILS NFR BLD AUTO: 68 %
PLATELET # BLD AUTO: 180 10E3/UL (ref 150–450)
POTASSIUM SERPL-SCNC: 3.9 MMOL/L (ref 3.4–5.3)
PROT SERPL-MCNC: 6.7 G/DL (ref 6.4–8.3)
RBC # BLD AUTO: 4.83 10E6/UL (ref 4.4–5.9)
SODIUM SERPL-SCNC: 139 MMOL/L (ref 135–145)
TACROLIMUS BLD-MCNC: 5 UG/L (ref 5–15)
TME LAST DOSE: NORMAL H
TME LAST DOSE: NORMAL H
WBC # BLD AUTO: 6.7 10E3/UL (ref 4–11)

## 2024-12-04 PROCEDURE — 87799 DETECT AGENT NOS DNA QUANT: CPT

## 2024-12-04 PROCEDURE — 80053 COMPREHEN METABOLIC PANEL: CPT

## 2024-12-04 PROCEDURE — 99207 PR NO CHARGE NURSE ONLY: CPT

## 2024-12-04 PROCEDURE — 80197 ASSAY OF TACROLIMUS: CPT

## 2024-12-04 PROCEDURE — 85025 COMPLETE CBC W/AUTO DIFF WBC: CPT | Mod: QW

## 2024-12-04 PROCEDURE — G0010 ADMIN HEPATITIS B VACCINE: HCPCS

## 2024-12-04 PROCEDURE — 82248 BILIRUBIN DIRECT: CPT

## 2024-12-04 PROCEDURE — 90746 HEPB VACCINE 3 DOSE ADULT IM: CPT

## 2024-12-04 PROCEDURE — 36415 COLL VENOUS BLD VENIPUNCTURE: CPT

## 2024-12-05 LAB
BK VIRUS SPECIMEN TYPE: NORMAL
BKV DNA # SPEC NAA+PROBE: NOT DETECTED IU/ML

## 2024-12-18 DIAGNOSIS — Z94.4 LIVER REPLACED BY TRANSPLANT (H): ICD-10-CM

## 2024-12-18 DIAGNOSIS — Z94.0 HTN, KIDNEY TRANSPLANT RELATED: ICD-10-CM

## 2024-12-18 DIAGNOSIS — I15.1 HTN, KIDNEY TRANSPLANT RELATED: ICD-10-CM

## 2024-12-18 DIAGNOSIS — Z94.0 KIDNEY REPLACED BY TRANSPLANT: ICD-10-CM

## 2024-12-18 RX ORDER — TACROLIMUS 0.5 MG/1
0.5 CAPSULE ORAL 2 TIMES DAILY
Qty: 180 CAPSULE | Refills: 3 | Status: SHIPPED | OUTPATIENT
Start: 2024-12-18

## 2024-12-30 ENCOUNTER — MYC REFILL (OUTPATIENT)
Dept: GASTROENTEROLOGY | Facility: CLINIC | Age: 69
End: 2024-12-30
Payer: MEDICARE

## 2024-12-30 DIAGNOSIS — E03.9 HYPOTHYROIDISM, UNSPECIFIED TYPE: ICD-10-CM

## 2024-12-30 DIAGNOSIS — G62.9 NEUROPATHY: ICD-10-CM

## 2024-12-30 DIAGNOSIS — I15.1 HTN, KIDNEY TRANSPLANT RELATED: ICD-10-CM

## 2024-12-30 DIAGNOSIS — Z94.0 HTN, KIDNEY TRANSPLANT RELATED: ICD-10-CM

## 2024-12-30 RX ORDER — GABAPENTIN 100 MG/1
CAPSULE ORAL
Qty: 150 CAPSULE | Refills: 11 | Status: SHIPPED | OUTPATIENT
Start: 2024-12-30

## 2024-12-30 RX ORDER — LEVOTHYROXINE SODIUM 88 UG/1
88 TABLET ORAL DAILY
Qty: 90 TABLET | Refills: 3 | Status: SHIPPED | OUTPATIENT
Start: 2024-12-30

## 2025-01-06 DIAGNOSIS — I77.82 ANCA-ASSOCIATED VASCULITIS (H): Primary | ICD-10-CM

## 2025-01-06 DIAGNOSIS — M1A.9XX1 TOPHACEOUS GOUT: ICD-10-CM

## 2025-01-08 ENCOUNTER — LAB (OUTPATIENT)
Dept: LAB | Facility: CLINIC | Age: 70
End: 2025-01-08
Payer: MEDICARE

## 2025-01-08 ENCOUNTER — MYC MEDICAL ADVICE (OUTPATIENT)
Dept: OTHER | Age: 70
End: 2025-01-08

## 2025-01-08 DIAGNOSIS — Z94.0 KIDNEY REPLACED BY TRANSPLANT: ICD-10-CM

## 2025-01-08 DIAGNOSIS — Z48.288 ENCOUNTER FOR AFTERCARE FOLLOWING MULTIPLE ORGAN TRANSPLANT: ICD-10-CM

## 2025-01-08 DIAGNOSIS — M1A.9XX1 TOPHACEOUS GOUT: ICD-10-CM

## 2025-01-08 DIAGNOSIS — I77.82 ANCA-ASSOCIATED VASCULITIS (H): ICD-10-CM

## 2025-01-08 DIAGNOSIS — Z79.899 ENCOUNTER FOR LONG-TERM (CURRENT) USE OF MEDICATIONS: ICD-10-CM

## 2025-01-08 DIAGNOSIS — Z94.4 LIVER REPLACED BY TRANSPLANT (H): ICD-10-CM

## 2025-01-08 DIAGNOSIS — Z94.4 LIVER REPLACED BY TRANSPLANT (H): Primary | ICD-10-CM

## 2025-01-08 LAB
ALBUMIN MFR UR ELPH: 19.9 MG/DL
ALBUMIN SERPL BCG-MCNC: 4.2 G/DL (ref 3.5–5.2)
ALBUMIN UR-MCNC: ABNORMAL MG/DL
ALP SERPL-CCNC: 156 U/L (ref 40–150)
ALT SERPL W P-5'-P-CCNC: 47 U/L (ref 0–70)
ANION GAP SERPL CALCULATED.3IONS-SCNC: 12 MMOL/L (ref 7–15)
APPEARANCE UR: CLEAR
AST SERPL W P-5'-P-CCNC: 41 U/L (ref 0–45)
BACTERIA #/AREA URNS HPF: ABNORMAL /HPF
BASOPHILS # BLD AUTO: 0 10E3/UL (ref 0–0.2)
BASOPHILS NFR BLD AUTO: 1 %
BILIRUB DIRECT SERPL-MCNC: 0.3 MG/DL (ref 0–0.3)
BILIRUB SERPL-MCNC: 1 MG/DL
BILIRUB UR QL STRIP: NEGATIVE
BUN SERPL-MCNC: 14.8 MG/DL (ref 8–23)
CALCIUM SERPL-MCNC: 9.4 MG/DL (ref 8.8–10.4)
CHLORIDE SERPL-SCNC: 98 MMOL/L (ref 98–107)
COLOR UR AUTO: YELLOW
CREAT SERPL-MCNC: 0.96 MG/DL (ref 0.67–1.17)
CREAT UR-MCNC: 120 MG/DL
EGFRCR SERPLBLD CKD-EPI 2021: 87 ML/MIN/1.73M2
EOSINOPHIL # BLD AUTO: 0.1 10E3/UL (ref 0–0.7)
EOSINOPHIL NFR BLD AUTO: 3 %
ERYTHROCYTE [DISTWIDTH] IN BLOOD BY AUTOMATED COUNT: 14 % (ref 10–15)
GLUCOSE SERPL-MCNC: 108 MG/DL (ref 70–99)
GLUCOSE UR STRIP-MCNC: NEGATIVE MG/DL
HCO3 SERPL-SCNC: 29 MMOL/L (ref 22–29)
HCT VFR BLD AUTO: 46.5 % (ref 40–53)
HGB BLD-MCNC: 15.1 G/DL (ref 13.3–17.7)
HGB UR QL STRIP: ABNORMAL
IMM GRANULOCYTES # BLD: 0.1 10E3/UL
IMM GRANULOCYTES NFR BLD: 1 %
KETONES UR STRIP-MCNC: NEGATIVE MG/DL
LEUKOCYTE ESTERASE UR QL STRIP: ABNORMAL
LYMPHOCYTES # BLD AUTO: 0.9 10E3/UL (ref 0.8–5.3)
LYMPHOCYTES NFR BLD AUTO: 21 %
MCH RBC QN AUTO: 32.1 PG (ref 26.5–33)
MCHC RBC AUTO-ENTMCNC: 32.5 G/DL (ref 31.5–36.5)
MCV RBC AUTO: 99 FL (ref 78–100)
MONOCYTES # BLD AUTO: 0.4 10E3/UL (ref 0–1.3)
MONOCYTES NFR BLD AUTO: 9 %
NEUTROPHILS # BLD AUTO: 2.7 10E3/UL (ref 1.6–8.3)
NEUTROPHILS NFR BLD AUTO: 65 %
NITRATE UR QL: NEGATIVE
PH UR STRIP: 7.5 [PH] (ref 5–7)
PLATELET # BLD AUTO: 191 10E3/UL (ref 150–450)
POTASSIUM SERPL-SCNC: 4.1 MMOL/L (ref 3.4–5.3)
PROT SERPL-MCNC: 6.8 G/DL (ref 6.4–8.3)
PROT/CREAT 24H UR: 0.17 MG/MG CR (ref 0–0.2)
RBC # BLD AUTO: 4.71 10E6/UL (ref 4.4–5.9)
RBC #/AREA URNS AUTO: ABNORMAL /HPF
SODIUM SERPL-SCNC: 139 MMOL/L (ref 135–145)
SP GR UR STRIP: 1.01 (ref 1–1.03)
SQUAMOUS #/AREA URNS AUTO: ABNORMAL /LPF
TACROLIMUS BLD-MCNC: 4.4 UG/L (ref 5–15)
TME LAST DOSE: ABNORMAL H
TME LAST DOSE: ABNORMAL H
URATE SERPL-MCNC: 5.1 MG/DL (ref 3.4–7)
UROBILINOGEN UR STRIP-ACNC: 2 E.U./DL
WBC # BLD AUTO: 4.1 10E3/UL (ref 4–11)
WBC #/AREA URNS AUTO: ABNORMAL /HPF

## 2025-01-08 PROCEDURE — 81001 URINALYSIS AUTO W/SCOPE: CPT

## 2025-01-08 PROCEDURE — 36415 COLL VENOUS BLD VENIPUNCTURE: CPT

## 2025-01-08 PROCEDURE — 85025 COMPLETE CBC W/AUTO DIFF WBC: CPT | Mod: QW

## 2025-01-08 PROCEDURE — 84156 ASSAY OF PROTEIN URINE: CPT

## 2025-01-08 PROCEDURE — 84550 ASSAY OF BLOOD/URIC ACID: CPT

## 2025-01-08 PROCEDURE — 80197 ASSAY OF TACROLIMUS: CPT

## 2025-01-08 PROCEDURE — 86036 ANCA SCREEN EACH ANTIBODY: CPT

## 2025-01-08 PROCEDURE — 82248 BILIRUBIN DIRECT: CPT

## 2025-01-08 PROCEDURE — 80053 COMPREHEN METABOLIC PANEL: CPT

## 2025-01-09 LAB
ANCA AB PATTERN SER IF-IMP: NORMAL
BK VIRUS SPECIMEN TYPE: NORMAL
BKV DNA # SPEC NAA+PROBE: NOT DETECTED IU/ML
C-ANCA TITR SER IF: NORMAL {TITER}

## 2025-01-16 DIAGNOSIS — Z94.4 LIVER TRANSPLANT RECIPIENT (H): Primary | ICD-10-CM

## 2025-01-16 DIAGNOSIS — I15.1 HTN, KIDNEY TRANSPLANT RELATED: ICD-10-CM

## 2025-01-16 DIAGNOSIS — Z94.0 HTN, KIDNEY TRANSPLANT RELATED: ICD-10-CM

## 2025-01-16 DIAGNOSIS — Z94.0 KIDNEY REPLACED BY TRANSPLANT: ICD-10-CM

## 2025-01-16 RX ORDER — PREDNISONE 5 MG/1
5 TABLET ORAL DAILY
Qty: 90 TABLET | Refills: 3 | Status: SHIPPED | OUTPATIENT
Start: 2025-01-16

## 2025-01-23 ENCOUNTER — TELEPHONE (OUTPATIENT)
Dept: GASTROENTEROLOGY | Facility: CLINIC | Age: 70
End: 2025-01-23
Payer: MEDICARE

## 2025-01-23 DIAGNOSIS — I15.1 HTN, KIDNEY TRANSPLANT RELATED: ICD-10-CM

## 2025-01-23 DIAGNOSIS — Z94.0 HTN, KIDNEY TRANSPLANT RELATED: ICD-10-CM

## 2025-01-23 NOTE — TELEPHONE ENCOUNTER
Patient confirmed scheduled appointment:     Date: 5/7/25  Time: 11:00 am  Appointment Type: Return Liver  Provider: Dr. Lolis Bermudez  Location: [place]  Testing/imaging: Lab  Additional Notes:

## 2025-02-04 ENCOUNTER — TELEPHONE (OUTPATIENT)
Dept: TRANSPLANT | Facility: CLINIC | Age: 70
End: 2025-02-04
Payer: MEDICARE

## 2025-02-04 DIAGNOSIS — I15.1 HTN, KIDNEY TRANSPLANT RELATED: ICD-10-CM

## 2025-02-04 DIAGNOSIS — Z94.0 HTN, KIDNEY TRANSPLANT RELATED: ICD-10-CM

## 2025-02-04 NOTE — LETTER
OUTPATIENT LABORATORY TEST ORDER   [facility]  Fax#[phone]    Patient Name: Damion Quinones   YOB: 1955     [facility] MR# [if applicable]: [id number]   Date & Time: February 4, 2025  9:46 AM  Expiration Date: 1 year after date issued      Diagnosis: Kidney Transplant (ICD-10 Z94.0)    Liver Transplant (ICD-10 Z94.4)    Aftercare following organ transplant (ICD-10 Z48.288)    Long term use of medications (ICD-10 Z79.899)    We ask your assistance in obtaining the following laboratory tests, which are part of our routine surveillance program for Solid Organ Transplant patients.     Please fax each result to [phone], same day as resulted/available    Critical lab results page [phone]    Years 1-2 post-transplant (6/6/24-6/6/25):   Monthly  CBC with platelets  Basic Metabolic Panel (Sodium, Potassium, Chloride, CO2, Creatinine, Urea Nitrogen, glucose, Calcium)  Hepatic Panel  Tacrolimus/Prograf/ drug level     BK Virus by PCR, Quantitative    At 13 months post-transplant  PRA/DSA level (mailers provided by the patient)    At 15 months post-transplant  T Cell Subset (CD4)    At 18 months post-transplant:  PRA / DSA (mailers provided by the patient)  T Cell Subset (CD4)      Years 2-5 post-transplant:   Every other month   CBC with platelets  Basic Metabolic Panel (Sodium, Potassium, Chloride, CO2, Creatinine, Urea Nitrogen, glucose, Calcium)  Hepatic Panel  Tacrolimus/Prograf/ drug level                   Every 6 Months    Urine for protein/creatinine      At 2 years post-transplant  PRA/DSA level (mailers provided by patient)  T cell subset (CD4) if on PJP Prophylaxis other than Bactrim  Magnesium (if on HBV antiviral therapy or magnesium supplements)     Phosphorus  Lipid Panel  Urine for protein/creatinine  Urinalysis w/ microscopic reflex to culture  Ferritin (for patients with hemochromatosis)  Hepatitis B if HBsAG+ at time of transplant    At 3 years  post-transplant  PRA/DSA level (mailers provided by patient)  T cell subset (CD4)   Magnesium (if on HBV antiviral therapy or magnesium supplements)     Phosphorus  Lipid Panel  Urine for protein/creatinine  Urinalysis w/ microscopic reflex to culture  Ferritin (for patients with hemochromatosis)  Hepatitis B if HBsAG+ at time of transplant      If you have any questions, please call [facility]- [phone] or [phone], Fax- [phone].      Lolis Bermudez MD   of Medicine  Division of Gastroenterology, Hepatology, and Nutrition  [facility]

## 2025-03-03 ENCOUNTER — HOSPITAL ENCOUNTER (OUTPATIENT)
Dept: CT IMAGING | Facility: CLINIC | Age: 70
Discharge: HOME | End: 2025-03-03
Attending: INTERNAL MEDICINE
Payer: MEDICARE

## 2025-03-03 DIAGNOSIS — C22.0 HCC (HEPATOCELLULAR CARCINOMA) (H): ICD-10-CM

## 2025-03-03 DIAGNOSIS — Z94.4 LIVER TRANSPLANT RECIPIENT (H): ICD-10-CM

## 2025-03-03 LAB
CREAT BLD-MCNC: 1.1 MG/DL (ref 0.7–1.3)
EGFRCR SERPLBLD CKD-EPI 2021: >60 ML/MIN/1.73M2

## 2025-03-03 PROCEDURE — 250N000011 HC RX IP 250 OP 636: Performed by: INTERNAL MEDICINE

## 2025-03-03 PROCEDURE — 82565 ASSAY OF CREATININE: CPT

## 2025-03-03 PROCEDURE — 71260 CT THORAX DX C+: CPT

## 2025-03-03 RX ORDER — IOPAMIDOL 755 MG/ML
90 INJECTION, SOLUTION INTRAVASCULAR ONCE
Status: COMPLETED | OUTPATIENT
Start: 2025-03-03 | End: 2025-03-03

## 2025-03-03 RX ADMIN — IOPAMIDOL 90 ML: 755 INJECTION, SOLUTION INTRAVENOUS at 09:09

## 2025-03-06 DIAGNOSIS — Z94.4 LIVER TRANSPLANT RECIPIENT (H): ICD-10-CM

## 2025-03-06 RX ORDER — MYCOPHENOLIC ACID 180 MG/1
540 TABLET, DELAYED RELEASE ORAL 2 TIMES DAILY
Qty: 540 TABLET | Refills: 3 | Status: SHIPPED | OUTPATIENT
Start: 2025-03-06

## 2025-03-11 DIAGNOSIS — Z94.4 LIVER TRANSPLANT RECIPIENT (H): ICD-10-CM

## 2025-03-11 DIAGNOSIS — I15.1 HTN, KIDNEY TRANSPLANT RELATED: ICD-10-CM

## 2025-03-11 DIAGNOSIS — Z94.0 HTN, KIDNEY TRANSPLANT RELATED: ICD-10-CM

## 2025-03-12 ENCOUNTER — LAB (OUTPATIENT)
Dept: LAB | Facility: CLINIC | Age: 70
End: 2025-03-12
Payer: MEDICARE

## 2025-03-12 DIAGNOSIS — Z48.288 ENCOUNTER FOR AFTERCARE FOLLOWING MULTIPLE ORGAN TRANSPLANT: ICD-10-CM

## 2025-03-12 DIAGNOSIS — Z94.0 KIDNEY REPLACED BY TRANSPLANT: ICD-10-CM

## 2025-03-12 DIAGNOSIS — Z94.4 LIVER REPLACED BY TRANSPLANT (H): ICD-10-CM

## 2025-03-12 DIAGNOSIS — Z79.899 ENCOUNTER FOR LONG-TERM (CURRENT) USE OF MEDICATIONS: ICD-10-CM

## 2025-03-12 DIAGNOSIS — Z94.0 HTN, KIDNEY TRANSPLANT RELATED: ICD-10-CM

## 2025-03-12 DIAGNOSIS — F10.11 ALCOHOL ABUSE, IN REMISSION: ICD-10-CM

## 2025-03-12 DIAGNOSIS — I15.1 HTN, KIDNEY TRANSPLANT RELATED: ICD-10-CM

## 2025-03-12 LAB
ALBUMIN MFR UR ELPH: 28.8 MG/DL
ALBUMIN SERPL BCG-MCNC: 4.2 G/DL (ref 3.5–5.2)
ALBUMIN UR-MCNC: 30 MG/DL
ALP SERPL-CCNC: 132 U/L (ref 40–150)
ALT SERPL W P-5'-P-CCNC: 51 U/L (ref 0–70)
APPEARANCE UR: CLEAR
AST SERPL W P-5'-P-CCNC: 54 U/L (ref 0–45)
BACTERIA #/AREA URNS HPF: ABNORMAL /HPF
BASOPHILS # BLD AUTO: 0 10E3/UL (ref 0–0.2)
BASOPHILS NFR BLD AUTO: 1 %
BILIRUB DIRECT SERPL-MCNC: 0.53 MG/DL (ref 0–0.3)
BILIRUB SERPL-MCNC: 1.1 MG/DL
BILIRUB UR QL STRIP: ABNORMAL
CHOLEST SERPL-MCNC: 201 MG/DL
COLOR UR AUTO: YELLOW
CREAT UR-MCNC: 194 MG/DL
EOSINOPHIL # BLD AUTO: 0.1 10E3/UL (ref 0–0.7)
EOSINOPHIL NFR BLD AUTO: 2 %
ERYTHROCYTE [DISTWIDTH] IN BLOOD BY AUTOMATED COUNT: 14 % (ref 10–15)
FASTING STATUS PATIENT QL REPORTED: ABNORMAL
GLUCOSE UR STRIP-MCNC: NEGATIVE MG/DL
HCT VFR BLD AUTO: 45 % (ref 40–53)
HDLC SERPL-MCNC: 62 MG/DL
HGB BLD-MCNC: 15.3 G/DL (ref 13.3–17.7)
HGB UR QL STRIP: ABNORMAL
IMM GRANULOCYTES # BLD: 0 10E3/UL
IMM GRANULOCYTES NFR BLD: 1 %
KETONES UR STRIP-MCNC: NEGATIVE MG/DL
LDLC SERPL CALC-MCNC: 64 MG/DL
LEUKOCYTE ESTERASE UR QL STRIP: ABNORMAL
LYMPHOCYTES # BLD AUTO: 1.3 10E3/UL (ref 0.8–5.3)
LYMPHOCYTES NFR BLD AUTO: 23 %
MAGNESIUM SERPL-MCNC: 1.2 MG/DL (ref 1.7–2.3)
MCH RBC QN AUTO: 33.1 PG (ref 26.5–33)
MCHC RBC AUTO-ENTMCNC: 34 G/DL (ref 31.5–36.5)
MCV RBC AUTO: 97 FL (ref 78–100)
MONOCYTES # BLD AUTO: 0.4 10E3/UL (ref 0–1.3)
MONOCYTES NFR BLD AUTO: 7 %
NEUTROPHILS # BLD AUTO: 3.9 10E3/UL (ref 1.6–8.3)
NEUTROPHILS NFR BLD AUTO: 67 %
NITRATE UR QL: NEGATIVE
NONHDLC SERPL-MCNC: 139 MG/DL
PH UR STRIP: 6.5 [PH] (ref 5–7)
PHOSPHATE SERPL-MCNC: 3.5 MG/DL (ref 2.5–4.5)
PLATELET # BLD AUTO: 161 10E3/UL (ref 150–450)
PROT SERPL-MCNC: 6.6 G/DL (ref 6.4–8.3)
PROT/CREAT 24H UR: 0.15 MG/MG CR (ref 0–0.2)
RBC # BLD AUTO: 4.62 10E6/UL (ref 4.4–5.9)
RBC #/AREA URNS AUTO: ABNORMAL /HPF
SP GR UR STRIP: 1.01 (ref 1–1.03)
SQUAMOUS #/AREA URNS AUTO: ABNORMAL /LPF
TACROLIMUS BLD-MCNC: 5.3 UG/L (ref 5–15)
TME LAST DOSE: NORMAL H
TME LAST DOSE: NORMAL H
TRIGL SERPL-MCNC: 376 MG/DL
UROBILINOGEN UR STRIP-ACNC: 1 E.U./DL
WBC # BLD AUTO: 5.8 10E3/UL (ref 4–11)
WBC #/AREA URNS AUTO: ABNORMAL /HPF

## 2025-03-13 ENCOUNTER — MYC MEDICAL ADVICE (OUTPATIENT)
Dept: TRANSPLANT | Facility: CLINIC | Age: 70
End: 2025-03-13
Payer: MEDICARE

## 2025-03-13 DIAGNOSIS — Z94.4 LIVER TRANSPLANT RECIPIENT (H): ICD-10-CM

## 2025-03-13 DIAGNOSIS — Z94.0 HTN, KIDNEY TRANSPLANT RELATED: ICD-10-CM

## 2025-03-13 DIAGNOSIS — E83.42 HYPOMAGNESEMIA: Primary | ICD-10-CM

## 2025-03-13 DIAGNOSIS — I15.1 HTN, KIDNEY TRANSPLANT RELATED: ICD-10-CM

## 2025-03-13 LAB
BK VIRUS SPECIMEN TYPE: NORMAL
BKV DNA # SPEC NAA+PROBE: NOT DETECTED IU/ML

## 2025-03-13 RX ORDER — MAGNESIUM OXIDE 400 MG/1
800 TABLET ORAL DAILY
Qty: 60 TABLET | Refills: 11 | Status: SHIPPED | OUTPATIENT
Start: 2025-03-13

## 2025-03-15 LAB
ANION GAP SERPL CALCULATED.3IONS-SCNC: 14 MMOL/L (ref 7–15)
BUN SERPL-MCNC: 12.1 MG/DL (ref 8–23)
CALCIUM SERPL-MCNC: ABNORMAL MG/DL
CHLORIDE SERPL-SCNC: 96 MMOL/L (ref 98–107)
CREAT SERPL-MCNC: 1.04 MG/DL (ref 0.67–1.17)
EGFRCR SERPLBLD CKD-EPI 2021: 79 ML/MIN/1.73M2
FASTING STATUS PATIENT QL REPORTED: ABNORMAL
GLUCOSE SERPL-MCNC: 109 MG/DL (ref 70–99)
HCO3 SERPL-SCNC: 28 MMOL/L (ref 22–29)
Lab: NORMAL
PERFORMING LABORATORY: NORMAL
POTASSIUM SERPL-SCNC: 3.7 MMOL/L (ref 3.4–5.3)
SODIUM SERPL-SCNC: 138 MMOL/L (ref 135–145)
SPECIMEN STATUS: NORMAL
TEST NAME: NORMAL

## 2025-03-17 ENCOUNTER — MYC REFILL (OUTPATIENT)
Dept: TRANSPLANT | Facility: CLINIC | Age: 70
End: 2025-03-17
Payer: MEDICARE

## 2025-03-17 DIAGNOSIS — I15.1 HTN, KIDNEY TRANSPLANT RELATED: ICD-10-CM

## 2025-03-17 DIAGNOSIS — Z94.4 LIVER REPLACED BY TRANSPLANT (H): Primary | ICD-10-CM

## 2025-03-17 DIAGNOSIS — F10.11 ALCOHOL ABUSE, IN REMISSION: ICD-10-CM

## 2025-03-17 DIAGNOSIS — Z94.0 HTN, KIDNEY TRANSPLANT RELATED: ICD-10-CM

## 2025-03-17 DIAGNOSIS — Z94.0 KIDNEY REPLACED BY TRANSPLANT: ICD-10-CM

## 2025-03-17 LAB — MISCELLANEOUS TEST 1 (ARUP): NORMAL

## 2025-03-17 RX ORDER — FUROSEMIDE 20 MG/1
20 TABLET ORAL DAILY
Qty: 90 TABLET | Refills: 3 | Status: SHIPPED | OUTPATIENT
Start: 2025-03-17

## 2025-03-17 NOTE — PROGRESS NOTES
"Message received from Dr. Bermudez:    \"Repeat PETH + urine EtG in 1 month and then again in 3 months\".    Labs ordered.  "

## 2025-03-30 ENCOUNTER — TELEPHONE (OUTPATIENT)
Dept: TRANSPLANT | Facility: CLINIC | Age: 70
End: 2025-03-30
Payer: MEDICARE

## 2025-03-30 DIAGNOSIS — Z94.0 KIDNEY REPLACED BY TRANSPLANT: Primary | ICD-10-CM

## 2025-03-30 DIAGNOSIS — I15.1 HTN, KIDNEY TRANSPLANT RELATED: ICD-10-CM

## 2025-03-30 DIAGNOSIS — Z94.0 HTN, KIDNEY TRANSPLANT RELATED: ICD-10-CM

## 2025-03-30 NOTE — TELEPHONE ENCOUNTER
Due  for transplant  nephrology  appointment  2 years post transplant   - Dr Marbella Marrero     Placed orders for  follow up  appointment  with Dr Marbella Marrero

## 2025-04-08 DIAGNOSIS — Z94.4 LIVER REPLACED BY TRANSPLANT (H): ICD-10-CM

## 2025-04-08 DIAGNOSIS — Z94.0 HTN, KIDNEY TRANSPLANT RELATED: ICD-10-CM

## 2025-04-08 DIAGNOSIS — I15.1 HTN, KIDNEY TRANSPLANT RELATED: ICD-10-CM

## 2025-04-08 DIAGNOSIS — Z94.0 KIDNEY REPLACED BY TRANSPLANT: ICD-10-CM

## 2025-04-08 RX ORDER — TACROLIMUS 0.5 MG/1
0.5 CAPSULE ORAL 2 TIMES DAILY
Qty: 60 CAPSULE | Refills: 11 | Status: SHIPPED | OUTPATIENT
Start: 2025-04-08

## 2025-04-14 NOTE — PROGRESS NOTES
Assessment/Plan:      Casey was seen today for back pain.    Diagnoses and all orders for this visit:    Sacroiliac joint pain  -     PAIN Joint Injection Sacroiliac Joint Left; Future    Ankylosis, sacroiliac joint    Lumbar facet arthropathy    DISH (diffuse idiopathic skeletal hyperostosis)    Foraminal stenosis of lumbar region    Stenosis of lateral recess of lumbar spine    Lumbar radicular pain         Assessment: Pleasant 69 year old male  with history of coronary artery disease, hypertension, hypothyroid, Hepatocellular carcinoma status post liver and kidney transplant 1 year ago with:     1.   Persistent severe left-sided low back pain at the PSIS and sacroiliac joint consistent with sacroiliac joint pain.  Continues to have positive thigh thrust, Gaenslen's, SI distraction and Jose Roberto test on exam on the left..  Has worsened now difficult time laying down at night.  New CT scan shows degenerative changes of the left SI joint as well as likely ankylosis of the right SI joint.    2.  Improved chronic lumbar spine pain at the lumbosacral junction worse first thing in the morning better throughout the day and worse at night consistent with facet arthropathy.  Severe facet arthropathy L5-S1 bilaterally.  Positive facet loading on exam today and now only having axial low back pain with some left gluteal pain intermittently.  Gluteal pain likely referred from the facets.  Pain had improved with physical therapy and returned.  Remains approximately 90% improvement following bilateral L3, 4, 5 medial branch radiofrequency ablation.       3.  Previous left lower extremity radicular pain.  He has multilevel degenerative disc disease through the lumbar spine with severe left foraminal stenosis at L5-S1 and lateral recess stenosis with facet arthropathy and facet joint edema.  EMG findings positive for left S1 radiculopathy I also question if the L5 nerve is involved given the MRI findings.  Radicular pain has  dramatically improved/resolved following physical therapy and doing home exercises.  Continues to do relatively well.     4.  Myofascial pain lumbar spine.       5..  Diffuse idiopathic skeletal hyperostosis with ankylosis of the L4-5 level as well as much of the upper lumbar spine and lower thoracic spine.      6.  Paresthesias in the feet consistent with peripheral polyneuropathy.  On gabapentin  Per PCP.  Not treated today            Discussion:    1.  Patient presents we discussed the diagnosis and treatment options.  Right low back pain continues to do quite well following RF but the left side is persistent and quite aggravating for him limits his activity.  I reviewed the new CT scan.    2.  Recommend left sacroiliac joint injection.  This has been ordered.      3.  Continue to monitor lumbar spine pain related to facet arthropathy.  It has  been 6 months status post radiofrequency ablation.  Continues to do well in the right we will monitor.    4.  Continue to do home exercises from PT.    5.  Follow-up at his earliest convenience for injection and then with me 1 month.    It was our pleasure caring for your patient today, if there any questions or concerns please do not hesitate to contact us.    The longitudinal plan of care for the diagnosis(es)/condition(s) as documented were addressed during this visit. Due to the added complexity in care, I will continue to support Casey in the subsequent management and with ongoing continuity of care.      Subjective:   Patient ID: Damion Quinones is a 69 year old male.    History of Present Illness: Patient presents for follow-up evaluation of low back pain along with left gluteal pain and some lower extremity paresthesias towards the greater trochanter.  Paresthesia been doing fairly well but the left low back pain at the PSIS over the SI joint per his pointing on exam has been worsening.  Worse with any laying down in bed worse in the morning better with changing  position and moving around.  Relatively constant takes Tylenol.  Pain is a 6/10 at worst 5/10 today 4/10 at best.  Also an issue when he takes a bowel movement.  Right-sided low back pain still is doing well after lumbar facet radiofrequency ablation no complaints there.  Has tight tingling in his feet on gabapentin.  For his lumbar spine has had extensive physical therapy in the past continues to do home exercises.  This includes for the SI region.      Imaging: I reviewed CT abdomen pelvis from March 3 2025 to evaluate the lumbar spine.  This reveals right abdominal wall hernia.  Hips show right hip osteoarthritis greater than left at least moderate with subchondral cysts.  Vascular calcifications.  Slight left lumbar scoliotic curve with severe disc height loss L4-5 on the right.  Severe facet arthropathy L4-5 L5-S1.Anterior bridging osteophyte L4-5 severe facet arthropathy L5-S1 with what appears to be significant foraminal stenosis bilaterally L5-S1.  May have ankylosis of the right SI joint DJD of the left.    Review of Systems: Has some numbness and tingling left gluteal region headaches, no bowel or bladder incontinence, swallowing issues fevers or unintentional weight loss.         Current Outpatient Medications   Medication Sig Dispense Refill    Alcohol Swabs PADS Use to swab the area of the injection or quyen as directed Per insurance coverage (Patient not taking: Reported on 1/9/2025) 100 each 0    allopurinol (ZYLOPRIM) 100 MG tablet Take 100 mg by mouth daily      allopurinol (ZYLOPRIM) 300 MG tablet Take 300 mg by mouth daily 300 mg and 100 mg total dose 400 mg daily      anakinra (KINERET) 100 MG/0.67ML SOSY injection Inject 100 mg Subcutaneous twice a week      apixaban ANTICOAGULANT (ELIQUIS ANTICOAGULANT) 5 MG tablet Take 1 tablet (5 mg) by mouth 2 times daily. 180 tablet 3    atorvastatin (LIPITOR) 40 MG tablet Take 1 tablet (40 mg) by mouth every evening 90 tablet 3    Blood Glucose Monitoring  Suppl (ONETOUCH VERIO REFLECT) w/Device KIT       capsaicin (ZOSTRIX) 0.025 % external cream APPLY TOPICALLY TO FEET TWICE DAILY PRN 60 g 1    clotrimazole (LOTRIMIN) 1 % external cream Apply topically 2 times daily. For feet 60 g 11    diazepam (VALIUM) 5 MG tablet Valium 5 mg p.o., take 1 tablet 2 hours prior to injection, second tablet if needed 60 minutes prior.  Must have . 2 tablet 0    furosemide (LASIX) 20 MG tablet Take 1 tablet (20 mg) by mouth daily. 90 tablet 3    gabapentin (NEURONTIN) 100 MG capsule Take 1 capsule (100 mg) by mouth every morning AND 1 capsule (100 mg) daily (with lunch) AND 3 capsules (300 mg) every evening. 100 mg in am, 100 mg in the afternoon and 200 mg at bedtime by mouth daily and 100mg at midnight if not sleeping. 150 capsule 11    levothyroxine (SYNTHROID/LEVOTHROID) 88 MCG tablet Take 1 tablet (88 mcg) by mouth daily. Takes in the middle of the night daily 90 tablet 3    magnesium oxide (MAG-OX) 400 MG tablet Take 2 tablets (800 mg) by mouth daily. 60 tablet 11    metoprolol tartrate (LOPRESSOR) 50 MG tablet Take 1 tablet (50 mg) by mouth 2 times daily 180 tablet 3    multivitamin w/minerals (THERA-VIT-M) tablet Take 1 tablet by mouth daily 30 tablet 0    mupirocin (BACTROBAN) 2 % external ointment Apply topically 2 times daily (Patient taking differently: Apply topically as needed.) 60 g 3    MYFORTIC (BRAND) 180 MG EC tablet Take 3 tablets (540 mg) by mouth 2 times daily. 540 tablet 3    pantoprazole (PROTONIX) 20 MG EC tablet Take 1 tablet (20 mg) by mouth every other day 45 tablet 3    predniSONE (DELTASONE) 5 MG tablet Take 1 tablet (5 mg) by mouth daily. 90 tablet 3    Sharps Container MISC Use as directed to dispose of needles, lancets and other sharps 1 each 0    simethicone (MYLICON) 125 MG chewable tablet Take 125 mg by mouth 4 times daily as needed for intestinal gas      tacrolimus (GENERIC EQUIVALENT) 0.5 MG capsule Take 1 capsule (0.5 mg) by mouth 2 times  daily. 60 capsule 11    tadalafil (CIALIS) 5 MG tablet Take 1 tablet (5 mg) by mouth every 24 hours. 30 tablet 11    triamcinolone (KENALOG) 0.1 % external ointment Apply topically 2 times daily. 454 g 11    ursodiol (ACTIGALL) 250 MG tablet Take 1 tablet (250 mg) by mouth 2 times daily 180 tablet 3     No current facility-administered medications for this visit.       Past Medical History:   Diagnosis Date    ANCA-associated vasculitis (H) 2022    Antiplatelet or antithrombotic long-term use     Avascular necrosis (H)     Left femoral head    C. difficile colitis     Coronary artery disease     [facility] 4/2023 - complete occlusion of RCA    Gout     HCC (hepatocellular carcinoma) (H) 09/05/2023    History of hemochromatosis 10/11/2019    Hypertension     Hypothyroidism 12/19/2023    IgA nephropathy     Obesity     PAF (paroxysmal atrial fibrillation) (H)     Pre-diabetes 2023    Psoriatic arthritis (H)     RA (rheumatoid arthritis) (H)     Status post kidney transplant 06/06/2023    Induction with thymoglobulin 4mg/kg, + DSA CW9    Status post liver transplantation (H) 06/06/2023    for hx of alcohol related cirrhosis       The following portions of the patient's history were reviewed and updated as appropriate: allergies, current medications, past family history, past medical history, past social history, past surgical history and problem list.           Objective:   Physical Exam:    BP (!) 152/83   Pulse 67   There is no height or weight on file to calculate BMI.      General: Alert and oriented with normal affect. Attention, knowledge, memory, and language are intact. No acute distress.   Eyes: Sclerae are clear.  Respirations: Unlabored. CV: No lower extremity edema.     Gait:  Nonantalgic  Left SI pain with thigh thrust, Jose Roberto's test, Gaenslen's, AP distraction.  Sensation is intact to light touch throughout the   lower extremities.  Reflexes are 0 patellar and Achilles      Manual muscle testing reveals:  Right  /Left out of 5     5/5 hip flexors  5/5 knee flexors  5/5 knee extensors  5/5 ankle plantar flexors  5/5 ankle dorsiflexors  5/5    ankle evertors

## 2025-04-15 ENCOUNTER — TELEPHONE (OUTPATIENT)
Dept: PHYSICAL MEDICINE AND REHAB | Facility: CLINIC | Age: 70
End: 2025-04-15

## 2025-04-15 ENCOUNTER — OFFICE VISIT (OUTPATIENT)
Dept: PHYSICAL MEDICINE AND REHAB | Facility: CLINIC | Age: 70
End: 2025-04-15
Payer: MEDICARE

## 2025-04-15 VITALS — HEART RATE: 67 BPM | SYSTOLIC BLOOD PRESSURE: 152 MMHG | DIASTOLIC BLOOD PRESSURE: 83 MMHG

## 2025-04-15 DIAGNOSIS — M47.816 LUMBAR FACET ARTHROPATHY: ICD-10-CM

## 2025-04-15 DIAGNOSIS — M48.061 FORAMINAL STENOSIS OF LUMBAR REGION: ICD-10-CM

## 2025-04-15 DIAGNOSIS — M48.10 DISH (DIFFUSE IDIOPATHIC SKELETAL HYPEROSTOSIS): ICD-10-CM

## 2025-04-15 DIAGNOSIS — M53.3 SACROILIAC JOINT PAIN: Primary | ICD-10-CM

## 2025-04-15 DIAGNOSIS — M43.28 ANKYLOSIS, SACROILIAC JOINT: ICD-10-CM

## 2025-04-15 DIAGNOSIS — M48.061 STENOSIS OF LATERAL RECESS OF LUMBAR SPINE: ICD-10-CM

## 2025-04-15 DIAGNOSIS — M54.16 LUMBAR RADICULAR PAIN: ICD-10-CM

## 2025-04-15 ASSESSMENT — PAIN SCALES - GENERAL: PAINLEVEL_OUTOF10: MODERATE PAIN (5)

## 2025-04-15 NOTE — PATIENT INSTRUCTIONS
A Left sacroiliac joint injection has been ordered today. Please schedule this injection at least 2 weeks from now to allow time for insurance prior authorization. On the day of your injection, you cannot be sick or taking antibiotics. If you become sick and are prescribed, please call the clinic so your injection can be rescheduled for once you have completed your antibiotics.  It is recommended that you do not have a vaccination within 2 weeks of your procedure if it will contain steroids, as this may make the vaccination less effective.  You will need to bring a  with you for your injection. If you have any questions or concerns prior to your injection, please do not hesitate to call the nurse navigation line at [phone].   Continue with home exercises  Monitor the right sided lumbar pain

## 2025-04-15 NOTE — LETTER
4/15/2025      Damion Quinones   [address] [id number]      Dear Colleague,    Thank you for referring your patient, Damion Quinones, to the [facility]. Please see a copy of my visit note below.    Assessment/Plan:      Casey was seen today for back pain.    Diagnoses and all orders for this visit:    Sacroiliac joint pain  -     PAIN Joint Injection Sacroiliac Joint Left; Future    Ankylosis, sacroiliac joint    Lumbar facet arthropathy    DISH (diffuse idiopathic skeletal hyperostosis)    Foraminal stenosis of lumbar region    Stenosis of lateral recess of lumbar spine    Lumbar radicular pain         Assessment: Pleasant 69 year old male  with history of coronary artery disease, hypertension, hypothyroid, Hepatocellular carcinoma status post liver and kidney transplant 1 year ago with:     1.   Persistent severe left-sided low back pain at the PSIS and sacroiliac joint consistent with sacroiliac joint pain.  Continues to have positive thigh thrust, Gaenslen's, SI distraction and Jose Roberto test on exam on the left..  Has worsened now difficult time laying down at night.  New CT scan shows degenerative changes of the left SI joint as well as likely ankylosis of the right SI joint.    2.  Improved chronic lumbar spine pain at the lumbosacral junction worse first thing in the morning better throughout the day and worse at night consistent with facet arthropathy.  Severe facet arthropathy L5-S1 bilaterally.  Positive facet loading on exam today and now only having axial low back pain with some left gluteal pain intermittently.  Gluteal pain likely referred from the facets.  Pain had improved with physical therapy and returned.  Remains approximately 90% improvement following bilateral L3, 4, 5 medial branch radiofrequency ablation.       3.  Previous left lower extremity radicular pain.  He has multilevel degenerative disc disease through the lumbar spine with severe left foraminal  stenosis at L5-S1 and lateral recess stenosis with facet arthropathy and facet joint edema.  EMG findings positive for left S1 radiculopathy I also question if the L5 nerve is involved given the MRI findings.  Radicular pain has dramatically improved/resolved following physical therapy and doing home exercises.  Continues to do relatively well.     4.  Myofascial pain lumbar spine.       5..  Diffuse idiopathic skeletal hyperostosis with ankylosis of the L4-5 level as well as much of the upper lumbar spine and lower thoracic spine.      6.  Paresthesias in the feet consistent with peripheral polyneuropathy.  On gabapentin  Per PCP.  Not treated today            Discussion:    1.  Patient presents we discussed the diagnosis and treatment options.  Right low back pain continues to do quite well following RF but the left side is persistent and quite aggravating for him limits his activity.  I reviewed the new CT scan.    2.  Recommend left sacroiliac joint injection.  This has been ordered.      3.  Continue to monitor lumbar spine pain related to facet arthropathy.  It has  been 6 months status post radiofrequency ablation.  Continues to do well in the right we will monitor.    4.  Continue to do home exercises from PT.    5.  Follow-up at his earliest convenience for injection and then with me 1 month.    It was our pleasure caring for your patient today, if there any questions or concerns please do not hesitate to contact us.    The longitudinal plan of care for the diagnosis(es)/condition(s) as documented were addressed during this visit. Due to the added complexity in care, I will continue to support Casey in the subsequent management and with ongoing continuity of care.      Subjective:   Patient ID: Damion Quinones is a 69 year old male.    History of Present Illness: Patient presents for follow-up evaluation of low back pain along with left gluteal pain and some lower extremity paresthesias towards the greater  trochanter.  Paresthesia been doing fairly well but the left low back pain at the PSIS over the SI joint per his pointing on exam has been worsening.  Worse with any laying down in bed worse in the morning better with changing position and moving around.  Relatively constant takes Tylenol.  Pain is a 6/10 at worst 5/10 today 4/10 at best.  Also an issue when he takes a bowel movement.  Right-sided low back pain still is doing well after lumbar facet radiofrequency ablation no complaints there.  Has tight tingling in his feet on gabapentin.  For his lumbar spine has had extensive physical therapy in the past continues to do home exercises.  This includes for the SI region.      Imaging: I reviewed CT abdomen pelvis from March 3 2025 to evaluate the lumbar spine.  This reveals right abdominal wall hernia.  Hips show right hip osteoarthritis greater than left at least moderate with subchondral cysts.  Vascular calcifications.  Slight left lumbar scoliotic curve with severe disc height loss L4-5 on the right.  Severe facet arthropathy L4-5 L5-S1.Anterior bridging osteophyte L4-5 severe facet arthropathy L5-S1 with what appears to be significant foraminal stenosis bilaterally L5-S1.  May have ankylosis of the right SI joint DJD of the left.    Review of Systems: Has some numbness and tingling left gluteal region headaches, no bowel or bladder incontinence, swallowing issues fevers or unintentional weight loss.         Current Outpatient Medications   Medication Sig Dispense Refill     Alcohol Swabs PADS Use to swab the area of the injection or quyen as directed Per insurance coverage (Patient not taking: Reported on 1/9/2025) 100 each 0     allopurinol (ZYLOPRIM) 100 MG tablet Take 100 mg by mouth daily       allopurinol (ZYLOPRIM) 300 MG tablet Take 300 mg by mouth daily 300 mg and 100 mg total dose 400 mg daily       anakinra (KINERET) 100 MG/0.67ML SOSY injection Inject 100 mg Subcutaneous twice a week       apixaban  ANTICOAGULANT (ELIQUIS ANTICOAGULANT) 5 MG tablet Take 1 tablet (5 mg) by mouth 2 times daily. 180 tablet 3     atorvastatin (LIPITOR) 40 MG tablet Take 1 tablet (40 mg) by mouth every evening 90 tablet 3     Blood Glucose Monitoring Suppl (ONETOUCH VERIO REFLECT) w/Device KIT        capsaicin (ZOSTRIX) 0.025 % external cream APPLY TOPICALLY TO FEET TWICE DAILY PRN 60 g 1     clotrimazole (LOTRIMIN) 1 % external cream Apply topically 2 times daily. For feet 60 g 11     diazepam (VALIUM) 5 MG tablet Valium 5 mg p.o., take 1 tablet 2 hours prior to injection, second tablet if needed 60 minutes prior.  Must have . 2 tablet 0     furosemide (LASIX) 20 MG tablet Take 1 tablet (20 mg) by mouth daily. 90 tablet 3     gabapentin (NEURONTIN) 100 MG capsule Take 1 capsule (100 mg) by mouth every morning AND 1 capsule (100 mg) daily (with lunch) AND 3 capsules (300 mg) every evening. 100 mg in am, 100 mg in the afternoon and 200 mg at bedtime by mouth daily and 100mg at midnight if not sleeping. 150 capsule 11     levothyroxine (SYNTHROID/LEVOTHROID) 88 MCG tablet Take 1 tablet (88 mcg) by mouth daily. Takes in the middle of the night daily 90 tablet 3     magnesium oxide (MAG-OX) 400 MG tablet Take 2 tablets (800 mg) by mouth daily. 60 tablet 11     metoprolol tartrate (LOPRESSOR) 50 MG tablet Take 1 tablet (50 mg) by mouth 2 times daily 180 tablet 3     multivitamin w/minerals (THERA-VIT-M) tablet Take 1 tablet by mouth daily 30 tablet 0     mupirocin (BACTROBAN) 2 % external ointment Apply topically 2 times daily (Patient taking differently: Apply topically as needed.) 60 g 3     MYFORTIC (BRAND) 180 MG EC tablet Take 3 tablets (540 mg) by mouth 2 times daily. 540 tablet 3     pantoprazole (PROTONIX) 20 MG EC tablet Take 1 tablet (20 mg) by mouth every other day 45 tablet 3     predniSONE (DELTASONE) 5 MG tablet Take 1 tablet (5 mg) by mouth daily. 90 tablet 3     Sharps Container MISC Use as directed to dispose of  needles, lancets and other sharps 1 each 0     simethicone (MYLICON) 125 MG chewable tablet Take 125 mg by mouth 4 times daily as needed for intestinal gas       tacrolimus (GENERIC EQUIVALENT) 0.5 MG capsule Take 1 capsule (0.5 mg) by mouth 2 times daily. 60 capsule 11     tadalafil (CIALIS) 5 MG tablet Take 1 tablet (5 mg) by mouth every 24 hours. 30 tablet 11     triamcinolone (KENALOG) 0.1 % external ointment Apply topically 2 times daily. 454 g 11     ursodiol (ACTIGALL) 250 MG tablet Take 1 tablet (250 mg) by mouth 2 times daily 180 tablet 3     No current facility-administered medications for this visit.       Past Medical History:   Diagnosis Date     ANCA-associated vasculitis (H) 2022     Antiplatelet or antithrombotic long-term use      Avascular necrosis (H)     Left femoral head     C. difficile colitis      Coronary artery disease     [facility] 4/2023 - complete occlusion of RCA     Gout      HCC (hepatocellular carcinoma) (H) 09/05/2023     History of hemochromatosis 10/11/2019     Hypertension      Hypothyroidism 12/19/2023     IgA nephropathy      Obesity      PAF (paroxysmal atrial fibrillation) (H)      Pre-diabetes 2023     Psoriatic arthritis (H)      RA (rheumatoid arthritis) (H)      Status post kidney transplant 06/06/2023    Induction with thymoglobulin 4mg/kg, + DSA CW9     Status post liver transplantation (H) 06/06/2023    for hx of alcohol related cirrhosis       The following portions of the patient's history were reviewed and updated as appropriate: allergies, current medications, past family history, past medical history, past social history, past surgical history and problem list.           Objective:   Physical Exam:    BP (!) 152/83   Pulse 67   There is no height or weight on file to calculate BMI.      General: Alert and oriented with normal affect. Attention, knowledge, memory, and language are intact. No acute distress.   Eyes: Sclerae are clear.  Respirations: Unlabored. CV: No  lower extremity edema.     Gait:  Nonantalgic  Left SI pain with thigh thrust, Jose Roberto's test, Gaenslen's, AP distraction.  Sensation is intact to light touch throughout the   lower extremities.  Reflexes are 0 patellar and Achilles      Manual muscle testing reveals:  Right /Left out of 5     5/5 hip flexors  5/5 knee flexors  5/5 knee extensors  5/5 ankle plantar flexors  5/5 ankle dorsiflexors  5/5    ankle evertors      Again, thank you for allowing me to participate in the care of your patient.        Sincerely,        Abdulaziz Mejia, DO    Electronically signed

## 2025-04-15 NOTE — TELEPHONE ENCOUNTER
Procedure ordered? Yes     What insurance are we billing for this procedure?  Medicare   IF SCHEDULING AT [facility] PLEASE SCHEDULE AT LEAST 7-10 BUSINESS DAYS OUT SO A PA CAN BE OBTAINED    Is a  PAIN  order available to link to injection appointment or does one need to be transcribed?  YES:   Left SI joint injection - [facility]      If needed, route to CN to assist in doing so.    Is  needed?: No   If YES, please initiate in-person  request.    Will patient have a ?  Yes    All fluoro procedures require a .  These US procedures also require a : piriformis, pudendal, scalene, & carpal tunnel.    Is patient taking any blood thinners (i.e. Plavix, coumadin, jantoven, warfarin, heparin, Xarelto, Pradaxa, Eliquis, Brilinta, or Effient, etc)? Yes - Eliquis       If YES, schedule out 2 weeks, and route to RN pool to determine if medication hold is needed.    Is patient taking aspirin? No   For CERVICAL or INTERLAMINAR procedures, route message to Care Navigation so they can seek approval from managing provider to hold aspirin for 6 days prior to the procedure.    Does patient have an allergy to contrast dye or iodine?  No  If YES, OK to schedule. Route to RN pool AND add allergy information to appointment notes    Is patient diabetic? No If YES, blood glucose level will be checked on day of procedure and will need to be 300mg/dL or below (for steroid injections).    Does patient have an active infection or treated for one within the past week? No   If YES, do NOT schedule and route to Care Navigation.     Is patient currently taking any antibiotics or have an active infection?  No  For patients on chronic, preventative, or prophylactic antibiotics, procedures may be scheduled.   For patients on antibiotics for active or recent infection: antibiotic course must have been completed and symptom-free of infection to safely proceed with injection.  Send to Care Navigation if  unsure.    Is patient actively being treated for cancer or immunocompromised? No  If YES, do NOT schedule and route to RN pool.     Any chance of pregnancy? Not Applicable   If YES, do NOT schedule and route to RN pool.    Have you had any vaccines in the last 2 weeks? NO  If YES, please route to Care Navigation to discuss before scheduling.     Reminders:  -  If you are started on any steroids or antibiotics between now and your appointment, you must contact us because it may affect our ability to perform your procedure.   reviewed    -  Informed patient that it is OK to take normal medications before the procedure and must hold blood thinners as instructed.  reviewed and not discussed    -  Patients scheduled for MBBs should not take pain meds prior to injection and pain rating needs to be 5/10 or greater the day of the procedure.    -  Informed cervical injection patients that an IV will be placed as a precautionary measure.  reviewed and not discussed    -  Patients should eat a light meal prior to their procedure appointment.  reviewed    -  All radiofrequency ablations are in a 40 minute time slot and not to be scheduled until in-basket message has been sent by the procedure team that the PA has been  received.  reviewed and not discussed     -  Spinal cord stimulator trial scheduling is coordinated by the procedure team.    -  Care Navigation (#[phone]) is available to assist patients with additional questions.  reviewed    -  Cervical TFESIs with Dr. Pryor only.

## 2025-04-16 ENCOUNTER — TELEPHONE (OUTPATIENT)
Dept: TRANSPLANT | Facility: CLINIC | Age: 70
End: 2025-04-16

## 2025-04-16 ENCOUNTER — LAB (OUTPATIENT)
Dept: LAB | Facility: CLINIC | Age: 70
End: 2025-04-16
Payer: MEDICARE

## 2025-04-16 DIAGNOSIS — Z94.0 HTN, KIDNEY TRANSPLANT RELATED: ICD-10-CM

## 2025-04-16 DIAGNOSIS — I15.1 HTN, KIDNEY TRANSPLANT RELATED: ICD-10-CM

## 2025-04-16 DIAGNOSIS — Z94.4 LIVER REPLACED BY TRANSPLANT (H): ICD-10-CM

## 2025-04-16 DIAGNOSIS — Z79.899 ENCOUNTER FOR LONG-TERM (CURRENT) USE OF MEDICATIONS: ICD-10-CM

## 2025-04-16 DIAGNOSIS — I77.82 ANCA-ASSOCIATED VASCULITIS (H): ICD-10-CM

## 2025-04-16 DIAGNOSIS — M1A.9XX1 TOPHACEOUS GOUT: ICD-10-CM

## 2025-04-16 DIAGNOSIS — Z94.0 KIDNEY REPLACED BY TRANSPLANT: ICD-10-CM

## 2025-04-16 DIAGNOSIS — F10.11 ALCOHOL ABUSE, IN REMISSION: ICD-10-CM

## 2025-04-16 DIAGNOSIS — Z48.288 ENCOUNTER FOR AFTERCARE FOLLOWING MULTIPLE ORGAN TRANSPLANT: ICD-10-CM

## 2025-04-16 LAB
ALBUMIN MFR UR ELPH: 21.1 MG/DL
ALBUMIN SERPL BCG-MCNC: 4.5 G/DL (ref 3.5–5.2)
ALBUMIN UR-MCNC: 30 MG/DL
ALP SERPL-CCNC: 133 U/L (ref 40–150)
ALT SERPL W P-5'-P-CCNC: 73 U/L (ref 0–70)
ANION GAP SERPL CALCULATED.3IONS-SCNC: 13 MMOL/L (ref 7–15)
APPEARANCE UR: CLEAR
AST SERPL W P-5'-P-CCNC: 52 U/L (ref 0–45)
BACTERIA #/AREA URNS HPF: ABNORMAL /HPF
BASOPHILS # BLD AUTO: 0 10E3/UL (ref 0–0.2)
BASOPHILS NFR BLD AUTO: 1 %
BILIRUB DIRECT SERPL-MCNC: 0.31 MG/DL (ref 0–0.3)
BILIRUB SERPL-MCNC: 0.7 MG/DL
BILIRUB UR QL STRIP: NEGATIVE
BUN SERPL-MCNC: 16.6 MG/DL (ref 8–23)
CALCIUM SERPL-MCNC: 10.1 MG/DL (ref 8.8–10.4)
CHLORIDE SERPL-SCNC: 98 MMOL/L (ref 98–107)
COLOR UR AUTO: YELLOW
CREAT SERPL-MCNC: 1.08 MG/DL (ref 0.67–1.17)
CREAT UR-MCNC: 51.5 MG/DL
EGFRCR SERPLBLD CKD-EPI 2021: 75 ML/MIN/1.73M2
EOSINOPHIL # BLD AUTO: 0.1 10E3/UL (ref 0–0.7)
EOSINOPHIL NFR BLD AUTO: 2 %
ERYTHROCYTE [DISTWIDTH] IN BLOOD BY AUTOMATED COUNT: 13.4 % (ref 10–15)
GLUCOSE SERPL-MCNC: 102 MG/DL (ref 70–99)
GLUCOSE UR STRIP-MCNC: NEGATIVE MG/DL
HCO3 SERPL-SCNC: 28 MMOL/L (ref 22–29)
HCT VFR BLD AUTO: 47.6 % (ref 40–53)
HGB BLD-MCNC: 15.8 G/DL (ref 13.3–17.7)
HGB UR QL STRIP: ABNORMAL
IMM GRANULOCYTES # BLD: 0.1 10E3/UL
IMM GRANULOCYTES NFR BLD: 1 %
KETONES UR STRIP-MCNC: NEGATIVE MG/DL
LEUKOCYTE ESTERASE UR QL STRIP: ABNORMAL
LYMPHOCYTES # BLD AUTO: 1.2 10E3/UL (ref 0.8–5.3)
LYMPHOCYTES NFR BLD AUTO: 20 %
MCH RBC QN AUTO: 32.8 PG (ref 26.5–33)
MCHC RBC AUTO-ENTMCNC: 33.2 G/DL (ref 31.5–36.5)
MCV RBC AUTO: 99 FL (ref 78–100)
MONOCYTES # BLD AUTO: 0.5 10E3/UL (ref 0–1.3)
MONOCYTES NFR BLD AUTO: 8 %
NEUTROPHILS # BLD AUTO: 4.1 10E3/UL (ref 1.6–8.3)
NEUTROPHILS NFR BLD AUTO: 69 %
NITRATE UR QL: NEGATIVE
PH UR STRIP: 7.5 [PH] (ref 5–7)
PLATELET # BLD AUTO: 179 10E3/UL (ref 150–450)
POTASSIUM SERPL-SCNC: 4 MMOL/L (ref 3.4–5.3)
PROT SERPL-MCNC: 7 G/DL (ref 6.4–8.3)
PROT/CREAT 24H UR: 0.41 MG/MG CR (ref 0–0.2)
RBC # BLD AUTO: 4.82 10E6/UL (ref 4.4–5.9)
RBC #/AREA URNS AUTO: ABNORMAL /HPF
SODIUM SERPL-SCNC: 139 MMOL/L (ref 135–145)
SP GR UR STRIP: 1.01 (ref 1–1.03)
SQUAMOUS #/AREA URNS AUTO: ABNORMAL /LPF
TACROLIMUS BLD-MCNC: 5.5 UG/L (ref 5–15)
TME LAST DOSE: NORMAL H
TME LAST DOSE: NORMAL H
URATE SERPL-MCNC: 5.8 MG/DL (ref 3.4–7)
UROBILINOGEN UR STRIP-ACNC: 0.2 E.U./DL
WBC # BLD AUTO: 5.9 10E3/UL (ref 4–11)
WBC #/AREA URNS AUTO: ABNORMAL /HPF

## 2025-04-16 PROCEDURE — 80197 ASSAY OF TACROLIMUS: CPT | Performed by: INTERNAL MEDICINE

## 2025-04-16 PROCEDURE — 86036 ANCA SCREEN EACH ANTIBODY: CPT | Performed by: INTERNAL MEDICINE

## 2025-04-16 PROCEDURE — 84550 ASSAY OF BLOOD/URIC ACID: CPT | Performed by: INTERNAL MEDICINE

## 2025-04-16 PROCEDURE — 87799 DETECT AGENT NOS DNA QUANT: CPT | Performed by: INTERNAL MEDICINE

## 2025-04-16 PROCEDURE — 82310 ASSAY OF CALCIUM: CPT | Performed by: INTERNAL MEDICINE

## 2025-04-16 PROCEDURE — 82248 BILIRUBIN DIRECT: CPT | Performed by: INTERNAL MEDICINE

## 2025-04-16 PROCEDURE — 83516 IMMUNOASSAY NONANTIBODY: CPT | Performed by: INTERNAL MEDICINE

## 2025-04-16 PROCEDURE — 84156 ASSAY OF PROTEIN URINE: CPT | Performed by: INTERNAL MEDICINE

## 2025-04-17 DIAGNOSIS — I15.1 HTN, KIDNEY TRANSPLANT RELATED: ICD-10-CM

## 2025-04-17 DIAGNOSIS — Z94.4 LIVER REPLACED BY TRANSPLANT (H): ICD-10-CM

## 2025-04-17 DIAGNOSIS — Z94.0 HTN, KIDNEY TRANSPLANT RELATED: ICD-10-CM

## 2025-04-17 DIAGNOSIS — Z94.4 LIVER TRANSPLANT RECIPIENT (H): Primary | ICD-10-CM

## 2025-04-17 LAB
ANCA AB PATTERN SER IF-IMP: NORMAL
BK VIRUS SPECIMEN TYPE: NORMAL
BKV DNA # SPEC NAA+PROBE: NOT DETECTED IU/ML
C-ANCA TITR SER IF: NORMAL {TITER}
ETHYL GLUCURONIDE UR QL SCN: NEGATIVE NG/ML

## 2025-04-17 NOTE — PROGRESS NOTES
AST/ALT slightly elevated. Dr. Bermudez would like liver transplant US and fibroscan. Ok to have fibroscan done when Casey sees Dr. Bermudez on 5/7.

## 2025-04-22 ENCOUNTER — MYC MEDICAL ADVICE (OUTPATIENT)
Dept: OTHER | Age: 70
End: 2025-04-22

## 2025-04-22 DIAGNOSIS — I15.1 HTN, KIDNEY TRANSPLANT RELATED: ICD-10-CM

## 2025-04-22 DIAGNOSIS — Z94.4 LIVER TRANSPLANT RECIPIENT (H): Primary | ICD-10-CM

## 2025-04-22 DIAGNOSIS — E83.42 HYPOMAGNESEMIA: ICD-10-CM

## 2025-04-22 DIAGNOSIS — Z94.0 HTN, KIDNEY TRANSPLANT RELATED: ICD-10-CM

## 2025-04-22 DIAGNOSIS — F10.11 ALCOHOL ABUSE, IN REMISSION: ICD-10-CM

## 2025-04-22 NOTE — PROGRESS NOTES
Magnesium low with last labs. Magnesium not done with last labs, unable to add on. Monthly magnesium order entered. Peth ordered for next month.

## 2025-05-04 DIAGNOSIS — I15.1 HTN, KIDNEY TRANSPLANT RELATED: Primary | ICD-10-CM

## 2025-05-04 DIAGNOSIS — Z94.0 HTN, KIDNEY TRANSPLANT RELATED: Primary | ICD-10-CM

## 2025-05-05 ENCOUNTER — TELEPHONE (OUTPATIENT)
Dept: TRANSPLANT | Facility: CLINIC | Age: 70
End: 2025-05-05
Payer: MEDICARE

## 2025-05-05 DIAGNOSIS — Z94.0 HTN, KIDNEY TRANSPLANT RELATED: Primary | ICD-10-CM

## 2025-05-05 DIAGNOSIS — I15.1 HTN, KIDNEY TRANSPLANT RELATED: Primary | ICD-10-CM

## 2025-05-05 NOTE — TELEPHONE ENCOUNTER
[facility] Call Center    Phone Message    May a detailed message be left on voicemail: yes     Reason for Call: Other: patient stated he needs to be seen after a recent CT shows he has a hernia. He is looking to schedule with Josh. Please call patient to schedule.     Action Taken: Message routed to:  Other: kareen    Travel Screening: Not Applicable

## 2025-05-13 ENCOUNTER — RADIOLOGY INJECTION OFFICE VISIT (OUTPATIENT)
Dept: PHYSICAL MEDICINE AND REHAB | Facility: CLINIC | Age: 70
End: 2025-05-13
Attending: PHYSICAL MEDICINE & REHABILITATION
Payer: MEDICARE

## 2025-05-13 VITALS
BODY MASS INDEX: 34.1 KG/M2 | RESPIRATION RATE: 16 BRPM | DIASTOLIC BLOOD PRESSURE: 86 MMHG | WEIGHT: 225 LBS | HEIGHT: 68 IN | TEMPERATURE: 98.1 F | HEART RATE: 82 BPM | OXYGEN SATURATION: 97 % | SYSTOLIC BLOOD PRESSURE: 132 MMHG

## 2025-05-13 DIAGNOSIS — M53.3 SACROILIAC JOINT PAIN: ICD-10-CM

## 2025-05-13 PROCEDURE — 27096 INJECT SACROILIAC JOINT: CPT | Mod: LT | Performed by: STUDENT IN AN ORGANIZED HEALTH CARE EDUCATION/TRAINING PROGRAM

## 2025-05-13 RX ORDER — TRIAMCINOLONE ACETONIDE 40 MG/ML
INJECTION, SUSPENSION INTRA-ARTICULAR; INTRAMUSCULAR
Status: COMPLETED | OUTPATIENT
Start: 2025-05-13 | End: 2025-05-13

## 2025-05-13 RX ORDER — LIDOCAINE HYDROCHLORIDE 10 MG/ML
INJECTION, SOLUTION EPIDURAL; INFILTRATION; INTRACAUDAL; PERINEURAL
Status: COMPLETED | OUTPATIENT
Start: 2025-05-13 | End: 2025-05-13

## 2025-05-13 RX ORDER — ROPIVACAINE HYDROCHLORIDE 5 MG/ML
INJECTION, SOLUTION EPIDURAL; INFILTRATION; PERINEURAL
Status: COMPLETED | OUTPATIENT
Start: 2025-05-13 | End: 2025-05-13

## 2025-05-13 RX ADMIN — ROPIVACAINE HYDROCHLORIDE 2 ML: 5 INJECTION, SOLUTION EPIDURAL; INFILTRATION; PERINEURAL at 08:15

## 2025-05-13 RX ADMIN — TRIAMCINOLONE ACETONIDE 40 MG: 40 INJECTION, SUSPENSION INTRA-ARTICULAR; INTRAMUSCULAR at 08:16

## 2025-05-13 RX ADMIN — LIDOCAINE HYDROCHLORIDE 2 ML: 10 INJECTION, SOLUTION EPIDURAL; INFILTRATION; INTRACAUDAL; PERINEURAL at 08:15

## 2025-05-13 ASSESSMENT — PAIN SCALES - GENERAL
PAINLEVEL_OUTOF10: SEVERE PAIN (7)
PAINLEVEL_OUTOF10: SEVERE PAIN (7)

## 2025-05-13 NOTE — PATIENT INSTRUCTIONS
DISCHARGE INSTRUCTIONS    During office hours (8:00 a.m.- 4:00 p.m.) questions or concerns may be answered  by calling Spine Center Navigation Nurses at  [phone].  Messages received after hours will be returned the following business day.      In the case of an emergency, please dial 911 or seek assistance at the nearest Emergency Room/Urgent Care facility.     All Patients:    You may experience an increase in your symptoms for the first 2 days (It may take anywhere between 2 days - 2 weeks for the steroid to have maximum effect).    You may use ice on the injection site, as frequently as 20 minutes each hour if needed.    You may take your pain medicine.    You may continue taking your regular medication after your injection. If you have had a medial branch block you may resume pain medication once your pain diary is completed.    You may shower. No swimming, tub bath, or hot tub for 48 hours.  You may remove your bandaid/bandage as soon as you are home.    You may resume light activities, as tolerated.    Resume your usual diet as tolerated.    If you were told to hold any blood thinning medications you may resume taking them 24 hours after your procedure as prescribed.    It is strongly advised that you do not drive for 1-3 hours post injection.    If you have had oral sedation:  Do not drive for 8 hours post injection.        POSSIBLE STEROID SIDE EFFECTS (If steroid/cortisone was used for your procedure    Swelling of the legs              Skin redness (flushing)     Mouth (oral) irritation   Increased blood sugar (glucose) levels            Sweats                    Mood changes  Headache  Sleeplessness  Weakened immune system for up to 14 days, which could increase the risk of devon the COVID-19 virus and/or experiencing more severe symptoms of the disease, if exposed.  Decreased effectiveness of vaccines if given within 2 weeks of the steroid.    -If you experience these symptoms, it should  only last for a short period         POSSIBLE PROCEDURE SIDE EFFECTS    Increased Pain           Increased numbness/tingling      Nausea/Vomiting          Bruising/bleeding at site      Redness or swelling                                              Difficulty walking      Weakness           Fever greater than 100.5    -Call the Spine Center if you are concerned    *In the event of a severe headache after an epidural steroid injection that is relieved by lying down, please call the [facility] to speak with a clinical staff member*

## 2025-05-14 ENCOUNTER — ANCILLARY PROCEDURE (OUTPATIENT)
Dept: ULTRASOUND IMAGING | Facility: CLINIC | Age: 70
End: 2025-05-14
Attending: INTERNAL MEDICINE
Payer: MEDICARE

## 2025-05-14 DIAGNOSIS — Z94.4 LIVER REPLACED BY TRANSPLANT (H): ICD-10-CM

## 2025-05-14 PROCEDURE — 93975 VASCULAR STUDY: CPT | Mod: GC | Performed by: STUDENT IN AN ORGANIZED HEALTH CARE EDUCATION/TRAINING PROGRAM

## 2025-05-15 ENCOUNTER — RESULTS FOLLOW-UP (OUTPATIENT)
Dept: TRANSPLANT | Facility: CLINIC | Age: 70
End: 2025-05-15

## 2025-05-15 DIAGNOSIS — I15.1 HTN, KIDNEY TRANSPLANT RELATED: ICD-10-CM

## 2025-05-15 DIAGNOSIS — Z94.0 HTN, KIDNEY TRANSPLANT RELATED: ICD-10-CM

## 2025-05-16 ENCOUNTER — RESULTS FOLLOW-UP (OUTPATIENT)
Dept: TRANSPLANT | Facility: CLINIC | Age: 70
End: 2025-05-16

## 2025-05-16 DIAGNOSIS — Z94.0 HTN, KIDNEY TRANSPLANT RELATED: ICD-10-CM

## 2025-05-16 DIAGNOSIS — I15.1 HTN, KIDNEY TRANSPLANT RELATED: ICD-10-CM

## 2025-05-16 PROCEDURE — 80197 ASSAY OF TACROLIMUS: CPT | Performed by: INTERNAL MEDICINE

## 2025-05-16 PROCEDURE — 83735 ASSAY OF MAGNESIUM: CPT | Performed by: INTERNAL MEDICINE

## 2025-05-16 PROCEDURE — 82310 ASSAY OF CALCIUM: CPT | Performed by: INTERNAL MEDICINE

## 2025-05-16 PROCEDURE — 84156 ASSAY OF PROTEIN URINE: CPT | Performed by: INTERNAL MEDICINE

## 2025-05-16 PROCEDURE — 87799 DETECT AGENT NOS DNA QUANT: CPT | Performed by: INTERNAL MEDICINE

## 2025-05-17 ENCOUNTER — HEALTH MAINTENANCE LETTER (OUTPATIENT)
Age: 70
End: 2025-05-17

## 2025-05-19 ENCOUNTER — TELEPHONE (OUTPATIENT)
Dept: TRANSPLANT | Facility: CLINIC | Age: 70
End: 2025-05-19

## 2025-05-19 ENCOUNTER — OFFICE VISIT (OUTPATIENT)
Dept: TRANSPLANT | Facility: CLINIC | Age: 70
End: 2025-05-19
Attending: TRANSPLANT SURGERY
Payer: MEDICARE

## 2025-05-19 VITALS
BODY MASS INDEX: 36.77 KG/M2 | DIASTOLIC BLOOD PRESSURE: 83 MMHG | OXYGEN SATURATION: 96 % | WEIGHT: 238.3 LBS | HEART RATE: 68 BPM | SYSTOLIC BLOOD PRESSURE: 154 MMHG

## 2025-05-19 DIAGNOSIS — Z94.0 HTN, KIDNEY TRANSPLANT RELATED: ICD-10-CM

## 2025-05-19 DIAGNOSIS — I15.1 HTN, KIDNEY TRANSPLANT RELATED: ICD-10-CM

## 2025-05-19 PROCEDURE — G0463 HOSPITAL OUTPT CLINIC VISIT: HCPCS | Performed by: TRANSPLANT SURGERY

## 2025-05-19 NOTE — LETTER
5/19/2025      Damion Quinones   [address] [facility] [address]      Dear Colleague,    Thank you for referring your patient, Damion Quinones, to the [facility]. Please see a copy of my visit note below.    Transplant Surgery -OUTPATIENT IMMUNOSUPPRESSION PROGRESS NOTE    Date of Visit: 05/19/2025    Transplants:  6/6/2023 (Liver), 6/6/2023 (Kidney); Postoperative day:  713 (Liver), 713 (Kidney)  ASSESMENT AND PLAN:  1.Graft Function:Liver allograft: no rejection or technical problems.  Mild increase in AST and ALT possible liver biopsy to evaluate that may be helpful.  2.Immunosuppression Management: Tacrolimus and CellCept  3.Hypertension: Okay  4.Renal Function: Good  5.Lab frequency: Monthly  6.Other:  Patient complains of discomfort in the right side of the abdomen.  No history of any obstruction or reducibility.  On physical examination I could not appreciate a hernia.  Patient has an active lifestyle.  He recently went fishing in [email or web address] and caught a 200 pound sawfish a video which look very impressive.  He is also has significant amount of weight and is wanting to use GLP-1 inhibitors.  For the hernia issue.  Would recommend wearing a binder at this time at all times except when he sleeping.    I have recommended he visit with Dr. Martinez before he starts any of the treatment.    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)     HTN, kidney transplant related     Hereditary hemochromatosis     Dyslipidemia     Tophaceous gout     Deep vein thrombosis (DVT) of  non-extremity vein, unspecified chronicity     CKD (chronic kidney disease) stage 2, GFR 60-89 ml/min     Paroxysmal atrial fibrillation (H)     Liver replaced by transplant (H)     Immunosuppressed status     Kidney replaced by transplant     CAD (coronary artery disease)     Aftercare following organ transplant     Peripheral neuropathy     Anemia in stage 2 chronic kidney disease     Hypothyroidism     Hypomagnesemia     Need for pneumocystis prophylaxis     GERD (gastroesophageal reflux disease)     Avascular necrosis of bone (H)     Alcohol dependence, in remission (H)     Lumbar radicular pain     Foraminal stenosis of lumbar region     Stenosis of lateral recess of lumbar spine     Lumbar facet arthropathy     Myofascial pain     Class 2 severe obesity due to excess calories with serious comorbidity in adult (H)     SOCIAL /FAMILY HISTORY: [x]                  No recent change    Past Medical History:   Diagnosis Date     ANCA-associated vasculitis (H) 2022     Antiplatelet or antithrombotic long-term use      Avascular necrosis (H)     Left femoral head     C. difficile colitis 12/19/2023     Coronary artery disease     [facility] 4/2023 - complete occlusion of RCA     Gout      HCC (hepatocellular carcinoma) (H) 09/05/2023     History of hemochromatosis 10/11/2019     Hypertension      Hypothyroidism 12/19/2023     IgA nephropathy      Obesity      PAF (paroxysmal atrial fibrillation) (H)      Pre-diabetes 2023     Psoriatic arthritis (H)      RA (rheumatoid arthritis) (H)      Status post kidney transplant 06/06/2023    Induction with thymoglobulin 4mg/kg, + DSA CW9     Status post liver transplantation (H) 06/06/2023    for hx of alcohol related cirrhosis     Past Surgical History:   Procedure Laterality Date     APPENDECTOMY      Removed at 18 Years Old      BENCH KIDNEY  06/06/2023    Procedure: Bench kidney;  Surgeon: Chad Clifton MD;  Location:  OR     BENCH LIVER  06/06/2023    Procedure: Bench  liver;  Surgeon: Chad Clifton MD;  Location: [facility] OR     COLONOSCOPY      2014 at [facility]      CV CORONARY ANGIOGRAM N/A 2023    Procedure: Coronary Angiogram;  Surgeon: Pablo Araujo MD;  Location:  HEART CARDIAC CATH LAB     EXPLORE GROIN N/A 12/10/2023    Procedure: Umbilical wound exploration, incision and drainage of abcess, exploratory laparotomy, mesh excision, small bowel primary repair;  Surgeon: Chad Clifton MD;  Location: [facility] OR     EYE SURGERY Bilateral     Cataract     H STATISTIC PICC LINE INSERTION >5YR, FAILED Left 2023    Unable to advance catheter over the axillary area     H STATISTIC PICC LINE INSERTION >5YR, FAILED       HERNIA REPAIR      History of bilateral inguinal hernia repair: 10/28/2014. Open hernia repair: 10/2017. Abdominal wound exploration and debridement 2017     HERNIORRHAPHY UMBILICAL N/A 2023    Procedure: HERNIORRHAPHY, UMBILICAL, OPEN, WITH MESH;  Surgeon: Chad Clifton MD;  Location: [facility] OR     INSERT SHUNT PORTAL TRANSJUGULAR INTRAHEPTIC  2022     IR CVC NON TUNNEL LINE REMOVAL  2023     IR CVC NON TUNNEL PLACEMENT > 5 YRS  2023     IR CVC TUNNEL PLACEMENT > 5 YRS OF AGE  2023     IR CVC TUNNEL PLACEMENT > 5 YRS OF AGE  2023     IR PICC PLACEMENT > 5 YRS OF AGE  2023     IR RENAL BIOPSY RIGHT  2023     picc failed attempt Right 2023    PICC failed attempt Right arm unable to thread the catheter in.     RETURN LIVER TRANSPLANT N/A 2023    Procedure: Return liver transplant. Intra-operative ultrasound;  Surgeon: Chad Clifton MD;  Location: [facility] OR     TRANSPLANT KIDNEY RECIPIENT  DONOR N/A 2023    Procedure: Transplant kidney recipient  donor, ureteral stent placement;  Surgeon: Chad Clifton MD;  Location: [facility]     TRANSPLANT LIVER RECIPIENT  DONOR N/A 2023    Procedure: Transplant liver recipient   donor;  Surgeon: Chad Clifton MD;  Location:  OR     Social History     Socioeconomic History     Marital status:      Spouse name: Not on file     Number of children: Not on file     Years of education: Not on file     Highest education level: Not on file   Occupational History     Not on file   Tobacco Use     Smoking status: Never     Passive exposure: Never     Smokeless tobacco: Never   Vaping Use     Vaping status: Never Used   Substance and Sexual Activity     Alcohol use: Not Currently     Comment: Last ETOH use was 2021     Drug use: Not Currently     Sexual activity: Yes   Other Topics Concern     Parent/sibling w/ CABG, MI or angioplasty before 65F 55M? Not Asked   Social History Narrative     Not on file     Social Drivers of Health     Financial Resource Strain: Not on file   Food Insecurity: Not on file   Transportation Needs: Not on file   Physical Activity: Sufficiently Active (3/27/2024)    Exercise Vital Sign      Days of Exercise per Week: 5 days      Minutes of Exercise per Session: 50 min   Stress: No Stress Concern Present (3/27/2024)    [employer] [place] [employer] - Occupational Stress Questionnaire      Feeling of Stress : Not at all   Social Connections: Unknown (3/27/2024)    Social Connection and Isolation Panel [NHANES]      Frequency of Communication with Friends and Family: Not on file      Frequency of Social Gatherings with Friends and Family: Twice a week      Attends Druze Services: Not on file      Active Member of Clubs or Organizations: Not on file      Attends Club or Organization Meetings: Not on file      Marital Status: Not on file   Interpersonal Safety: Not on file   Housing Stability: Not on file     Prescription Medications as of 2025         Rx Number Disp Refills Start End Last Dispensed Date Next Fill Date Owning Pharmacy    Alcohol Swabs PADS  100 each 0 2023 --   [facility] [place], [place]  - [address]    Sig: Use to swab the area of the injection or quyen as directed Per insurance coverage    Class: E-Prescribe    allopurinol (ZYLOPRIM) 100 MG tablet  -- --  --       Sig: Take 100 mg by mouth daily    Class: Historical    Route: Oral    allopurinol (ZYLOPRIM) 300 MG tablet  -- -- 11/24/2023 --       Sig: Take 300 mg by mouth daily 300 mg and 100 mg total dose 400 mg daily    Class: Historical    Route: Oral    anakinra (KINERET) 100 MG/0.67ML SOSY injection  -- -- 11/7/2023 --       Sig: Inject 100 mg Subcutaneous twice a week    Class: Historical    Route: Subcutaneous    apixaban ANTICOAGULANT (ELIQUIS ANTICOAGULANT) 5 MG tablet  180 tablet 3 9/9/2024 --   [facility] - [place], [place] - [address]    Sig: Take 1 tablet (5 mg) by mouth 2 times daily.    Class: E-Prescribe    Route: Oral    atorvastatin (LIPITOR) 40 MG tablet  90 tablet 3 8/20/2024 --   [facility] #[id number] [facility] [address]  AT [facility] &  [address]    Sig: Take 1 tablet (40 mg) by mouth every evening    Class: E-Prescribe    Route: Oral    Blood Glucose Monitoring Suppl (ONETOUCH VERIO REFLECT) w/Device KIT  -- -- 7/25/2023 --       Class: Historical    capsaicin (ZOSTRIX) 0.025 % external cream  60 g 1 9/20/2024 --   [facility] #[id number] [facility] [address]  AT [facility] &  [address]    Sig: APPLY TOPICALLY TO FEET TWICE DAILY PRN    Class: E-Prescribe    clotrimazole (LOTRIMIN) 1 % external cream  60 g 11 1/9/2025 --   [facility] #[id number] [facility] [address]  AT [facility] & [address]    Sig: Apply topically 2 times daily. For feet    Class: E-Prescribe    Route: Topical    diazepam (VALIUM) 5 MG tablet  2 tablet 0 9/19/2024 --   [facility] #[id number] [facility] [address]  AT [facility] OF [address] & [address]    Sig: Valium 5 mg p.o., take 1 tablet 2 hours prior to injection, second tablet if needed 60 minutes prior.  Must have .     Class: E-Prescribe    furosemide (LASIX) 20 MG tablet  90 tablet 3 3/17/2025 --   [id number] - [place], [place] - [address] AT [facility] [address]    Sig: Take 1 tablet (20 mg) by mouth daily.    Class: E-Prescribe    Route: Oral    gabapentin (NEURONTIN) 100 MG capsule  150 capsule 11 12/30/2024 --   [facility] #[id number] [facility] [address] [facility] AT [facility]    Sig: Take 1 capsule (100 mg) by mouth every morning AND 1 capsule (100 mg) daily (with lunch) AND 3 capsules (300 mg) every evening. 100 mg in am, 100 mg in the afternoon and 200 mg at bedtime by mouth daily and 100mg at midnight if not sleeping.    Class: E-Prescribe    Route: Oral    levothyroxine (SYNTHROID/LEVOTHROID) 88 MCG tablet  90 tablet 3 12/30/2024 --   [facility] #[id number] [place] [address] [facility] AT [facility]    Sig: Take 1 tablet (88 mcg) by mouth daily. Takes in the middle of the night daily    Class: E-Prescribe    Route: Oral    magnesium oxide (MAG-OX) 400 MG tablet  60 tablet 11 3/13/2025 --   [id number] - [place], [place] - [address] AT [facility] [address]    Sig: Take 2 tablets (800 mg) by mouth daily.    Class: E-Prescribe    Route: Oral    metoprolol tartrate (LOPRESSOR) 50 MG tablet  180 tablet 3 8/20/2024 --   [id number] [address] [facility] AT [facility]    Sig: Take 1 tablet (50 mg) by mouth 2 times daily    Class: E-Prescribe    Route: Oral    multivitamin w/minerals (THERA-VIT-M) tablet  30 tablet 0 8/16/2023 --   [facility] - [place], [place] - [address]     Sig: Take 1 tablet by mouth daily    Class: E-Prescribe    Route: Oral    mupirocin (BACTROBAN) 2 % external ointment  60 g 3 2/2/2024 --   [id number] - [place], [place] - [address] AT [facility] OF [address]    Sig: Apply topically 2 times daily    Class: E-Prescribe     Route: Topical    MYFORTIC (BRAND) 180 MG EC tablet  540 tablet 3 3/6/2025 --   [facility] - [place], [place] - [address]    Sig: Take 3 tablets (540 mg) by mouth 2 times daily.    Class: E-Prescribe    Route: Oral    pantoprazole (PROTONIX) 20 MG EC tablet  45 tablet 3 7/29/2024 --   [facility] #[id number] [place], [place] - [address]  AT [facility] &  [address]    Sig: Take 1 tablet (20 mg) by mouth every other day    Class: E-Prescribe    Route: Oral    predniSONE (DELTASONE) 5 MG tablet  90 tablet 3 1/16/2025 --   [facility] #[id number] - [place], [place] - [address] [facility] AT [facility]    Sig: Take 1 tablet (5 mg) by mouth daily.    Class: E-Prescribe    Notes to Pharmacy: TXP DT 6/6/2023 (Liver), 6/6/2023 (Kidney) TXP Dischg DT 6/25/2023 DX Kidney replaced by transplant Z94.0 [facility] ([place], [place])    Route: Oral    Sharps Container MISC  1 each 0 7/25/2023 --   [facility] [place], [place] - [address]    Sig: Use as directed to dispose of needles, lancets and other sharps    Class: E-Prescribe    simethicone (MYLICON) 125 MG chewable tablet  -- --  --       Sig: Take 125 mg by mouth 4 times daily as needed for intestinal gas    Class: Historical    Route: Oral    tacrolimus (GENERIC EQUIVALENT) 0.5 MG capsule  60 capsule 11 4/8/2025 --   [facility] - [place], [place] - [address]    Sig: Take 1 capsule (0.5 mg) by mouth 2 times daily.    Class: E-Prescribe    Route: Oral    tadalafil (CIALIS) 5 MG tablet  30 tablet 11 12/2/2024 --   [facility] #[id number] - [place], [place] - [address]  AT [facility] &  [address]    Sig: Take 1 tablet (5 mg) by mouth every 24 hours.    Class: E-Prescribe    Route: Oral    triamcinolone (KENALOG) 0.1 % external ointment  454 g 11 1/9/2025 --   [facility] #[id number] - [place], [place] - [facility] [address] [facility] AT [facility] OF  [address] & [address]    Sig: Apply topically 2 times daily.    Class: E-Prescribe    Route: Topical    ursodiol (ACTIGALL) 250 MG tablet  180 tablet 3 1/8/2024 --   [facility] - [place], [place] - [address]     Sig: Take 1 tablet (250 mg) by mouth 2 times daily    Class: E-Prescribe    Route: Oral          Patient has no known allergies.   REVIEW OF SYSTEMS (check box if normal)  [x]               GENERAL  [x]                 PULMONARY [x]                GENITOURINARY  [x]                CNS                 [x]                 CARDIAC  [x]                 ENDOCRINE  [x]                EARS,NOSE,THROAT [x]                 GASTROINTESTINAL [x]                 NEUROLOGIC    [x]                MUSCLOSKELTAL  [x]                  HEMATOLOGY      PHYSICAL EXAM (check box if normal)BP (!) 154/83   Pulse 68   Wt 108.1 kg (238 lb 4.8 oz)   SpO2 96%   BMI 36.77 kg/m          [x]            GENERAL:    [x]       EYES:  ICTERIC   []        YES  []                    NO  [x]           EXTREMITIES: Clubbing []       Y     [x]           N    [x]           EARS, NOSE, THROAT: Membranes Moist    YES   [x]                   NO []                  [x]           LUNGS:  CLEAR    YES       [x]                  NO    []                                [x]           SKIN: Jaundice           YES       []                  NO    [x]                   Rash: YES       []                  NO    [x]                                     [x]             HEART: Regular Rate          YES       [x]                  NO    []                   Incision Clean:  YES       [x]                  NO    []                                [x]                    ABDOMEN: The scar seems to have healed well with no hernia appreciated organomegaly YES       []                  NO    [x]                       [x]                    NEUROLOGICAL:  Nonfocal  YES       [x]                  NO    []                       [x]                     Hernia YES       []                  NO    [x]                   PSYCHIATRIC:  Appropriate  YES       [x]                  NO    []                       OTHER:                                                                                                   PAIN SCALE:: 3     Again, thank you for allowing me to participate in the care of your patient.        Sincerely,        Chad Clifton MD    Electronically signed

## 2025-05-19 NOTE — TELEPHONE ENCOUNTER
"Casey's peth continues to be positive. Message from Dr. Bermudez:    \"His abnormal liver studies are likely related to alcohol. He needs to stop drinking.     Ok to cancel liver biopsy.\"    Call to Casey. Left message. MyChart message sent.     Biopsy order cancelled.   "

## 2025-06-02 ENCOUNTER — MYC MEDICAL ADVICE (OUTPATIENT)
Dept: FAMILY MEDICINE | Facility: CLINIC | Age: 70
End: 2025-06-02
Payer: MEDICARE

## 2025-06-02 ENCOUNTER — MYC MEDICAL ADVICE (OUTPATIENT)
Dept: OTHER | Age: 70
End: 2025-06-02

## 2025-06-02 DIAGNOSIS — G62.9 NEUROPATHY: ICD-10-CM

## 2025-06-02 DIAGNOSIS — Z94.0 HTN, KIDNEY TRANSPLANT RELATED: ICD-10-CM

## 2025-06-02 DIAGNOSIS — I15.1 HTN, KIDNEY TRANSPLANT RELATED: ICD-10-CM

## 2025-06-02 RX ORDER — GABAPENTIN 100 MG/1
CAPSULE ORAL
Qty: 150 CAPSULE | Refills: 11 | Status: SHIPPED | OUTPATIENT
Start: 2025-06-02

## 2025-06-02 NOTE — PROGRESS NOTES
Casey sent a [facility] message that his pharmacy needs a new prescription for gabapentin and they have been trying to get ahold of the provider since last week. No requests have come through. Unsure why a new prescription is needed as 1 year supply was ordered and sent in December 2024. Prescription sent.

## 2025-06-17 DIAGNOSIS — Z94.0 HTN, KIDNEY TRANSPLANT RELATED: ICD-10-CM

## 2025-06-17 DIAGNOSIS — I15.1 HTN, KIDNEY TRANSPLANT RELATED: ICD-10-CM

## 2025-06-25 DIAGNOSIS — Z94.4 LIVER TRANSPLANT RECIPIENT (H): Primary | ICD-10-CM

## 2025-06-25 RX ORDER — MYCOPHENOLIC ACID 180 MG/1
540 TABLET, DELAYED RELEASE ORAL 2 TIMES DAILY
Qty: 540 TABLET | Refills: 0 | Status: SHIPPED | OUTPATIENT
Start: 2025-06-25

## 2025-07-21 ENCOUNTER — TELEPHONE (OUTPATIENT)
Dept: PHARMACY | Facility: CLINIC | Age: 70
End: 2025-07-21
Payer: MEDICARE

## 2025-07-21 NOTE — TELEPHONE ENCOUNTER
Clinical Pharmacy Consult:                                                      Transplant Specific: 24 month post transplant pharmacy review  Date of Transplant: 06/06/2023  Type of Transplant: kidney and liver  First Transplant: yes  History of rejection: no    Immunosuppression Regimen   TAC 0.5 mg qAM & 0.5 mg qPM, Prednisone 5 mg daily, and Myforitic 540 mg qAM & 540 mg qPM    Most recent level: 5.4 on 5/16/25  Pt adherent to lab draws: yes  Scr: 1.03  Lab Results   Component Value Date    CR 1.08 07/26/2023    CR 1.11 08/27/2019     Side effects: none    Prophylactic Medications  Antibacterial:  Bactrim - stopped      Antifungal: Nystatin - completed    Antiviral:Valcyte - completed    Acid Reducer: Protonix (pantoprazole)    Thrombosis Prevention: Apixiban 5 mg twice daily    Blood Pressure Management  Most recent home BP:  154/83 at [facility] 5/19/25    Hospitalizations/ER visits since last assessment: 0    Med rec/DUR performed: yes  Med Rec Discrepancies: no    Spoke with Casey via phone today. He is is doing very well, reported no issues with medications, no missed dose. Manages his own medications.    Denied any GI symptoms. No N/V/D or constipation.  Denied pain, fever, swelling, fatigue or ascites.    Liver Transplant:  Tac 0.5mg BID  MPA 540mg BID  Prednisone 5mg qDay    Patient will continue using [facility] for IS but will keep other maintenance at local [facility]. No refills needed today.    No other questions or concerns. Follow up in 1 year.    Flakito Roman, Pharm D  [facility]

## 2025-07-28 ENCOUNTER — VIRTUAL VISIT (OUTPATIENT)
Dept: TRANSPLANT | Facility: CLINIC | Age: 70
End: 2025-07-28
Attending: INTERNAL MEDICINE
Payer: MEDICARE

## 2025-07-28 DIAGNOSIS — Z94.0 KIDNEY REPLACED BY TRANSPLANT: ICD-10-CM

## 2025-07-28 DIAGNOSIS — Z48.298 AFTERCARE FOLLOWING ORGAN TRANSPLANT: ICD-10-CM

## 2025-07-28 DIAGNOSIS — I15.1 HTN, KIDNEY TRANSPLANT RELATED: ICD-10-CM

## 2025-07-28 DIAGNOSIS — Z94.0 HTN, KIDNEY TRANSPLANT RELATED: ICD-10-CM

## 2025-07-28 DIAGNOSIS — Z94.4 LIVER REPLACED BY TRANSPLANT (H): Primary | ICD-10-CM

## 2025-07-28 DIAGNOSIS — D84.9 IMMUNOSUPPRESSED STATUS: ICD-10-CM

## 2025-07-28 PROCEDURE — G2211 COMPLEX E/M VISIT ADD ON: HCPCS | Mod: 95 | Performed by: INTERNAL MEDICINE

## 2025-07-28 PROCEDURE — 98006 SYNCH AUDIO-VIDEO EST MOD 30: CPT | Performed by: INTERNAL MEDICINE

## 2025-07-28 PROCEDURE — 1126F AMNT PAIN NOTED NONE PRSNT: CPT | Mod: 95 | Performed by: INTERNAL MEDICINE

## 2025-08-02 PROBLEM — Z29.89 NEED FOR PNEUMOCYSTIS PROPHYLAXIS: Status: RESOLVED | Noted: 2023-12-28 | Resolved: 2025-08-02

## 2025-08-02 PROBLEM — D63.1 ANEMIA IN STAGE 2 CHRONIC KIDNEY DISEASE: Status: RESOLVED | Noted: 2023-11-22 | Resolved: 2025-08-02

## 2025-08-02 PROBLEM — N18.2 ANEMIA IN STAGE 2 CHRONIC KIDNEY DISEASE: Status: RESOLVED | Noted: 2023-11-22 | Resolved: 2025-08-02

## 2025-08-04 DIAGNOSIS — Z94.4 LIVER TRANSPLANT RECIPIENT (H): ICD-10-CM

## 2025-08-05 DIAGNOSIS — I48.21 PERMANENT ATRIAL FIBRILLATION (H): ICD-10-CM

## 2025-08-05 RX ORDER — MYCOPHENOLIC ACID 180 MG/1
540 TABLET, DELAYED RELEASE ORAL 2 TIMES DAILY
Qty: 540 TABLET | Refills: 0 | Status: SHIPPED | OUTPATIENT
Start: 2025-08-05

## 2025-08-05 RX ORDER — METOPROLOL TARTRATE 50 MG
50 TABLET ORAL 2 TIMES DAILY
Qty: 60 TABLET | Refills: 3 | Status: SHIPPED | OUTPATIENT
Start: 2025-08-05

## 2025-08-10 DIAGNOSIS — Z94.0 KIDNEY REPLACED BY TRANSPLANT: Primary | ICD-10-CM

## 2025-08-11 ENCOUNTER — MYC REFILL (OUTPATIENT)
Dept: TRANSPLANT | Facility: CLINIC | Age: 70
End: 2025-08-11
Payer: MEDICARE

## 2025-08-11 DIAGNOSIS — I15.1 HTN, KIDNEY TRANSPLANT RELATED: Primary | ICD-10-CM

## 2025-08-11 DIAGNOSIS — Z94.0 HTN, KIDNEY TRANSPLANT RELATED: Primary | ICD-10-CM

## 2025-08-11 DIAGNOSIS — Z94.0 KIDNEY REPLACED BY TRANSPLANT: ICD-10-CM

## 2025-08-12 RX ORDER — FUROSEMIDE 20 MG/1
20 TABLET ORAL DAILY
Qty: 90 TABLET | Refills: 0 | Status: SHIPPED | OUTPATIENT
Start: 2025-08-12

## 2025-08-16 ENCOUNTER — MYC REFILL (OUTPATIENT)
Dept: TRANSPLANT | Facility: CLINIC | Age: 70
End: 2025-08-16
Payer: MEDICARE

## 2025-08-16 DIAGNOSIS — Z94.0 KIDNEY REPLACED BY TRANSPLANT: ICD-10-CM

## 2025-08-16 DIAGNOSIS — Z94.0 HTN, KIDNEY TRANSPLANT RELATED: Primary | ICD-10-CM

## 2025-08-16 DIAGNOSIS — I15.1 HTN, KIDNEY TRANSPLANT RELATED: Primary | ICD-10-CM

## 2025-08-18 RX ORDER — FUROSEMIDE 20 MG/1
20 TABLET ORAL DAILY
Qty: 30 TABLET | Refills: 0 | Status: SHIPPED | OUTPATIENT
Start: 2025-08-18

## 2025-08-21 DIAGNOSIS — Z94.4 LIVER TRANSPLANT RECIPIENT (H): ICD-10-CM

## 2025-08-21 DIAGNOSIS — I15.1 HTN, KIDNEY TRANSPLANT RELATED: Primary | ICD-10-CM

## 2025-08-21 DIAGNOSIS — Z94.0 HTN, KIDNEY TRANSPLANT RELATED: Primary | ICD-10-CM

## 2025-08-21 RX ORDER — PANTOPRAZOLE SODIUM 20 MG/1
20 TABLET, DELAYED RELEASE ORAL EVERY OTHER DAY
Qty: 45 TABLET | Refills: 3 | Status: SHIPPED | OUTPATIENT
Start: 2025-08-21

## 2025-08-25 DIAGNOSIS — Z94.0 HTN, KIDNEY TRANSPLANT RELATED: Primary | ICD-10-CM

## 2025-08-25 DIAGNOSIS — I15.1 HTN, KIDNEY TRANSPLANT RELATED: Primary | ICD-10-CM

## 2025-08-25 DIAGNOSIS — Z94.4 LIVER TRANSPLANT RECIPIENT (H): ICD-10-CM

## 2025-08-25 RX ORDER — MYCOPHENOLIC ACID 180 MG/1
540 TABLET, DELAYED RELEASE ORAL 2 TIMES DAILY
Qty: 540 TABLET | Refills: 0 | Status: SHIPPED | OUTPATIENT
Start: 2025-08-25

## 2025-08-26 DIAGNOSIS — I15.1 HTN, KIDNEY TRANSPLANT RELATED: Primary | ICD-10-CM

## 2025-08-26 DIAGNOSIS — C22.0 HCC (HEPATOCELLULAR CARCINOMA) (H): ICD-10-CM

## 2025-08-26 DIAGNOSIS — Z94.4 LIVER REPLACED BY TRANSPLANT (H): ICD-10-CM

## 2025-08-26 DIAGNOSIS — Z94.0 HTN, KIDNEY TRANSPLANT RELATED: Primary | ICD-10-CM

## 2025-08-27 ENCOUNTER — TELEPHONE (OUTPATIENT)
Dept: DERMATOLOGY | Facility: CLINIC | Age: 70
End: 2025-08-27
Payer: MEDICARE

## 2025-08-27 DIAGNOSIS — I25.118 CORONARY ARTERY DISEASE OF NATIVE ARTERY OF NATIVE HEART WITH STABLE ANGINA PECTORIS: ICD-10-CM

## 2025-08-27 RX ORDER — ATORVASTATIN CALCIUM 40 MG/1
40 TABLET, FILM COATED ORAL EVERY EVENING
Qty: 90 TABLET | Refills: 3 | Status: SHIPPED | OUTPATIENT
Start: 2025-08-27

## 2025-09-02 ENCOUNTER — LAB (OUTPATIENT)
Dept: LAB | Facility: CLINIC | Age: 70
End: 2025-09-02
Payer: MEDICARE

## 2025-09-02 DIAGNOSIS — Z94.0 KIDNEY REPLACED BY TRANSPLANT: ICD-10-CM

## 2025-09-02 DIAGNOSIS — M1A.9XX1 TOPHACEOUS GOUT: ICD-10-CM

## 2025-09-02 DIAGNOSIS — I15.1 HTN, KIDNEY TRANSPLANT RELATED: ICD-10-CM

## 2025-09-02 DIAGNOSIS — Z94.4 LIVER TRANSPLANT RECIPIENT (H): ICD-10-CM

## 2025-09-02 DIAGNOSIS — F10.11 ALCOHOL ABUSE, IN REMISSION: ICD-10-CM

## 2025-09-02 DIAGNOSIS — E83.42 HYPOMAGNESEMIA: ICD-10-CM

## 2025-09-02 DIAGNOSIS — I77.82 ANCA-ASSOCIATED VASCULITIS (H): ICD-10-CM

## 2025-09-02 DIAGNOSIS — Z94.4 LIVER REPLACED BY TRANSPLANT (H): ICD-10-CM

## 2025-09-02 DIAGNOSIS — Z94.0 HTN, KIDNEY TRANSPLANT RELATED: ICD-10-CM

## 2025-09-02 LAB
ALBUMIN MFR UR ELPH: 11.6 MG/DL
ALBUMIN SERPL BCG-MCNC: 4.3 G/DL (ref 3.5–5.2)
ALBUMIN UR-MCNC: NEGATIVE MG/DL
ALP SERPL-CCNC: 101 U/L (ref 40–150)
ALT SERPL W P-5'-P-CCNC: 47 U/L (ref 0–70)
ANION GAP SERPL CALCULATED.3IONS-SCNC: 11 MMOL/L (ref 7–15)
APPEARANCE UR: CLEAR
AST SERPL W P-5'-P-CCNC: 40 U/L (ref 0–45)
BACTERIA #/AREA URNS HPF: ABNORMAL /HPF
BASOPHILS # BLD AUTO: <0.04 10E3/UL (ref 0–0.2)
BASOPHILS NFR BLD AUTO: 0.6 %
BILIRUB SERPL-MCNC: 0.6 MG/DL
BILIRUB UR QL STRIP: NEGATIVE
BILIRUBIN DIRECT (ROCHE PRO & PURE): 0.29 MG/DL (ref 0–0.45)
BUN SERPL-MCNC: 11.8 MG/DL (ref 8–23)
CALCIUM SERPL-MCNC: 9.8 MG/DL (ref 8.8–10.4)
CHLORIDE SERPL-SCNC: 100 MMOL/L (ref 98–107)
COLOR UR AUTO: YELLOW
CREAT SERPL-MCNC: 1.12 MG/DL (ref 0.67–1.17)
CREAT UR-MCNC: 62 MG/DL
EGFRCR SERPLBLD CKD-EPI 2021: 72 ML/MIN/1.73M2
EOSINOPHIL # BLD AUTO: 0.12 10E3/UL (ref 0–0.7)
EOSINOPHIL NFR BLD AUTO: 2.4 %
ERYTHROCYTE [DISTWIDTH] IN BLOOD BY AUTOMATED COUNT: 13.6 % (ref 10–15)
GLUCOSE SERPL-MCNC: 94 MG/DL (ref 70–99)
GLUCOSE UR STRIP-MCNC: NEGATIVE MG/DL
HCO3 SERPL-SCNC: 28 MMOL/L (ref 22–29)
HCT VFR BLD AUTO: 43.4 % (ref 40–53)
HGB BLD-MCNC: 14.1 G/DL (ref 13.3–17.7)
HGB UR QL STRIP: NEGATIVE
IMM GRANULOCYTES # BLD: <0.04 10E3/UL
IMM GRANULOCYTES NFR BLD: 0.6 %
KETONES UR STRIP-MCNC: NEGATIVE MG/DL
LEUKOCYTE ESTERASE UR QL STRIP: ABNORMAL
LYMPHOCYTES # BLD AUTO: 1.2 10E3/UL (ref 0.8–5.3)
LYMPHOCYTES NFR BLD AUTO: 23.8 %
MAGNESIUM SERPL-MCNC: 2 MG/DL (ref 1.7–2.3)
MCH RBC QN AUTO: 33.6 PG (ref 26.5–33)
MCHC RBC AUTO-ENTMCNC: 32.5 G/DL (ref 31.5–36.5)
MCV RBC AUTO: 103.3 FL (ref 78–100)
MONOCYTES # BLD AUTO: 0.43 10E3/UL (ref 0–1.3)
MONOCYTES NFR BLD AUTO: 8.5 %
NEUTROPHILS # BLD AUTO: 3.23 10E3/UL (ref 1.6–8.3)
NEUTROPHILS NFR BLD AUTO: 64.1 %
NITRATE UR QL: NEGATIVE
PH UR STRIP: 7.5 [PH] (ref 5–7)
PLATELET # BLD AUTO: 155 10E3/UL (ref 150–450)
POTASSIUM SERPL-SCNC: 4.4 MMOL/L (ref 3.4–5.3)
PROT SERPL-MCNC: 6.4 G/DL (ref 6.4–8.3)
PROT/CREAT 24H UR: 0.19 MG/MG CR (ref 0–0.2)
RBC # BLD AUTO: 4.2 10E6/UL (ref 4.4–5.9)
RBC #/AREA URNS AUTO: ABNORMAL /HPF
SODIUM SERPL-SCNC: 139 MMOL/L (ref 135–145)
SP GR UR STRIP: 1.01 (ref 1–1.03)
SQUAMOUS #/AREA URNS AUTO: ABNORMAL /LPF
TACROLIMUS BLD-MCNC: 5.7 UG/L (ref 5–15)
TME LAST DOSE: NORMAL H
TME LAST DOSE: NORMAL H
URATE SERPL-MCNC: 4.9 MG/DL (ref 3.4–7)
UROBILINOGEN UR STRIP-ACNC: 0.2 E.U./DL
WBC # BLD AUTO: 5.04 10E3/UL (ref 4–11)
WBC #/AREA URNS AUTO: ABNORMAL /HPF

## 2025-09-02 PROCEDURE — 82040 ASSAY OF SERUM ALBUMIN: CPT | Performed by: INTERNAL MEDICINE

## 2025-09-02 PROCEDURE — 86036 ANCA SCREEN EACH ANTIBODY: CPT | Performed by: INTERNAL MEDICINE

## 2025-09-02 PROCEDURE — 99000 SPECIMEN HANDLING OFFICE-LAB: CPT

## 2025-09-02 PROCEDURE — 36415 COLL VENOUS BLD VENIPUNCTURE: CPT

## 2025-09-02 PROCEDURE — G0480 DRUG TEST DEF 1-7 CLASSES: HCPCS | Mod: 90

## 2025-09-02 PROCEDURE — 85025 COMPLETE CBC W/AUTO DIFF WBC: CPT | Mod: QW

## 2025-09-02 PROCEDURE — 84550 ASSAY OF BLOOD/URIC ACID: CPT | Performed by: INTERNAL MEDICINE

## 2025-09-02 PROCEDURE — 83735 ASSAY OF MAGNESIUM: CPT | Performed by: INTERNAL MEDICINE

## 2025-09-02 PROCEDURE — 80197 ASSAY OF TACROLIMUS: CPT | Performed by: INTERNAL MEDICINE

## 2025-09-02 PROCEDURE — 82435 ASSAY OF BLOOD CHLORIDE: CPT | Performed by: INTERNAL MEDICINE

## 2025-09-02 PROCEDURE — 81001 URINALYSIS AUTO W/SCOPE: CPT | Mod: 59

## 2025-09-02 PROCEDURE — 84156 ASSAY OF PROTEIN URINE: CPT | Performed by: INTERNAL MEDICINE

## 2025-09-03 ENCOUNTER — OFFICE VISIT (OUTPATIENT)
Dept: GASTROENTEROLOGY | Facility: CLINIC | Age: 70
End: 2025-09-03
Attending: INTERNAL MEDICINE
Payer: MEDICARE

## 2025-09-03 VITALS
OXYGEN SATURATION: 97 % | BODY MASS INDEX: 37.03 KG/M2 | WEIGHT: 240 LBS | HEART RATE: 77 BPM | SYSTOLIC BLOOD PRESSURE: 167 MMHG | DIASTOLIC BLOOD PRESSURE: 95 MMHG

## 2025-09-03 DIAGNOSIS — F10.90 ALCOHOL USE DISORDER: ICD-10-CM

## 2025-09-03 DIAGNOSIS — I15.1 HTN, KIDNEY TRANSPLANT RELATED: ICD-10-CM

## 2025-09-03 DIAGNOSIS — E66.09 CLASS 2 OBESITY DUE TO EXCESS CALORIES WITHOUT SERIOUS COMORBIDITY WITH BODY MASS INDEX (BMI) OF 37.0 TO 37.9 IN ADULT: Primary | ICD-10-CM

## 2025-09-03 DIAGNOSIS — Z94.0 HTN, KIDNEY TRANSPLANT RELATED: ICD-10-CM

## 2025-09-03 DIAGNOSIS — E66.812 CLASS 2 OBESITY DUE TO EXCESS CALORIES WITHOUT SERIOUS COMORBIDITY WITH BODY MASS INDEX (BMI) OF 37.0 TO 37.9 IN ADULT: Primary | ICD-10-CM

## 2025-09-03 LAB
ANCA AB PATTERN SER IF-IMP: NORMAL
C-ANCA TITR SER IF: NORMAL {TITER}

## 2025-09-03 PROCEDURE — G0463 HOSPITAL OUTPT CLINIC VISIT: HCPCS | Performed by: INTERNAL MEDICINE

## 2025-09-03 RX ORDER — NALTREXONE HYDROCHLORIDE 50 MG/1
50 TABLET, FILM COATED ORAL DAILY
Qty: 90 TABLET | Refills: 3 | Status: SHIPPED | OUTPATIENT
Start: 2025-09-03

## 2025-09-03 ASSESSMENT — PAIN SCALES - GENERAL: PAINLEVEL_OUTOF10: NO PAIN (0)

## 2025-09-04 ENCOUNTER — PATIENT OUTREACH (OUTPATIENT)
Dept: CARE COORDINATION | Facility: CLINIC | Age: 70
End: 2025-09-04
Payer: MEDICARE

## 2025-09-04 DIAGNOSIS — I48.21 PERMANENT ATRIAL FIBRILLATION (H): ICD-10-CM

## 2025-09-04 LAB
LABORATORY REPORT: NORMAL
PETH INTERPRETATION: NORMAL
PLPETH BLD-MCNC: 145 NG/ML
POPETH BLD-MCNC: 197 NG/ML

## (undated) DEVICE — NDL COUNTER 40CT  31142311

## (undated) DEVICE — DRAPE ISOLATION BAG 1003

## (undated) DEVICE — TEST TUBE W/SCREW CAP 17361

## (undated) DEVICE — TUBING SUCTION 10'X3/16" N510

## (undated) DEVICE — SU VICRYL 3-0 SH 27" UND J416H

## (undated) DEVICE — SU PROLENE 5-0 RB-1DA 36"  8556H

## (undated) DEVICE — DRAIN JACKSON PRATT RESERVOIR 400ML SU130-1000

## (undated) DEVICE — CLIP APPLIER 11" MED LIGACLIP MCM30

## (undated) DEVICE — SU DERMABOND ADVANCED .7ML DNX12

## (undated) DEVICE — DRAPE IOBAN INCISE 23X17" 6650EZ

## (undated) DEVICE — Device

## (undated) DEVICE — SUCTION MANIFOLD NEPTUNE 2 SYS 4 PORT 0702-020-000

## (undated) DEVICE — DRSG TEGADERM 8X12" 1629

## (undated) DEVICE — DRSG STERI STRIP 1/4X4" R1546

## (undated) DEVICE — STPL LINEAR 30X2.5MM VASC TX30V

## (undated) DEVICE — DRAPE SHEET REV FOLD 3/4 9349

## (undated) DEVICE — RETR VISCERA FISH MED 3204

## (undated) DEVICE — SU PDS II 1 TP-1 54" Z879G

## (undated) DEVICE — PREP SKIN SCRUB TRAY 4461A

## (undated) DEVICE — SUCTION TIP YANKAUER W/O VENT K86

## (undated) DEVICE — SU SILK 2-0 TIE 12X30" A305H

## (undated) DEVICE — DRSG TELFA 3X8" 1238

## (undated) DEVICE — PREP CHLORAPREP 26ML TINTED HI-LITE ORANGE 930815

## (undated) DEVICE — SU PDS II 1 CTX 36" Z371T

## (undated) DEVICE — SU SILK 4-0 TIE 12X30" A303H

## (undated) DEVICE — SU PROLENE 4-0 RB-1DA 36" 8557H

## (undated) DEVICE — PREP POVIDONE IODINE SOLUTION 10% 4OZ BOTTLE 29906-004

## (undated) DEVICE — BLADE KNIFE SURG 11 371111

## (undated) DEVICE — LINEN TOWEL PACK X30 5481

## (undated) DEVICE — INTRO SHEATH AVANTI 4FRX23CM 504604T

## (undated) DEVICE — CLIP APPLIER 09 3/8" SM LIGACLIP MCS20

## (undated) DEVICE — STPL RELOAD LINEAR 30X2.5MM VASC XR30V

## (undated) DEVICE — CLIP ENDO HEMO-LOC GREEN MED/LG 544230

## (undated) DEVICE — DRAPE IOBAN INCISE LARGE 6658

## (undated) DEVICE — CATH ANGIO INFINITI JL5 4FRX100CM 538422

## (undated) DEVICE — SU ETHILON 3-0 PS-1 18" 1663H

## (undated) DEVICE — LINEN TOWEL PACK X5 5464

## (undated) DEVICE — SU PROLENE 1 CTX 30" 8455H

## (undated) DEVICE — CATH ANGIO INFINITI 3DRC 4FRX100CM 538476

## (undated) DEVICE — SU PROLENE 3-0 RB-1DA 36"  8558H

## (undated) DEVICE — SU SILK 3-0 TIE 12X30" A304H

## (undated) DEVICE — DRAPE SLUSH/WARMER 66X44" ORS-320

## (undated) DEVICE — ESU GROUND PAD ADULT W/CORD E7507

## (undated) DEVICE — CLIP APPLIER 13" LG LIGACLIP MCL20

## (undated) DEVICE — DRSG PRIMAPORE 02X3" 7133

## (undated) DEVICE — DRAPE SHEET MED 44X70" 9355

## (undated) DEVICE — SU PDS II 2-0 SH 27" Z317H

## (undated) DEVICE — KIT DRSG PREVENA PLUS NEG PRESSURE W/CANISTER PRE4001US

## (undated) DEVICE — DRSG PRIMAPORE 03 1/8X6" 66000318

## (undated) DEVICE — SPONGE LAP 18X18" X8435

## (undated) DEVICE — CATH FOLEY 3WAY 18FR 30ML SIL 73018SI

## (undated) DEVICE — SU PROLENE 7-0 BV-1DA 4X24" M8702

## (undated) DEVICE — SYR BULB IRRIG DOVER 60 ML LATEX FREE 67000

## (undated) DEVICE — DRAPE MAYO STAND 23X54 8337

## (undated) DEVICE — COVER CAMERA IN-LIGHT DISP LT-C02

## (undated) DEVICE — DRSG TELFA ISLAND 4X14" 7544

## (undated) DEVICE — SUCTION TIP YANKAUER VIAGUARD W/SLEEVE SMP-2006-001SS

## (undated) DEVICE — WIPES FOLEY CARE SURESTEP PROVON DFC100

## (undated) DEVICE — NDL 25GA 2"  8881200441

## (undated) DEVICE — APPLICATOR COTTON TIP 6"X2 STERILE LF 6012

## (undated) DEVICE — SYR 30ML LL W/O NDL 302832

## (undated) DEVICE — SYR 10ML LL W/O NDL 302995

## (undated) DEVICE — SU PROLENE 7-0 BV-1DA 30" 8703H

## (undated) DEVICE — SU PROLENE 0 CT-1 30" 8424H

## (undated) DEVICE — SU PROLENE 4-0 SHDA 36" 8521H

## (undated) DEVICE — TUBING IRR TUR Y TYPE 2C4041

## (undated) DEVICE — TUBING IRRIG CYSTO/BLADDER SET 81" LF 2C4040

## (undated) DEVICE — DRAIN JACKSON PRATT 19FR ROUND SU130-1325

## (undated) DEVICE — SOL NACL 0.9% IRRIG 1000ML BOTTLE 2F7124

## (undated) DEVICE — DRAPE STERI FLUOROSCOPE 35X43"1012 LATEX FREE

## (undated) DEVICE — SU PDS II 4-0 RB-1 27" Z304H

## (undated) DEVICE — MANIFOLD KIT ANGIO AUTOMATED 014613

## (undated) DEVICE — SU SILK 0 TIE 6X30" A306H

## (undated) DEVICE — BLADE CLIPPER SGL USE 9680

## (undated) DEVICE — SURGICEL ABSORBABLE HEMOSTAT SNOW 4"X4" 2083

## (undated) DEVICE — STPL SKIN 35W ROTATING HEAD PRW35

## (undated) DEVICE — NDL COUNTER 20CT 31142493

## (undated) DEVICE — SUTURE BOOTS 051003PBX

## (undated) DEVICE — CLAMP BULLDOG VASCUSTATT II MINI STR YELLOW 1001-572

## (undated) DEVICE — TAPE MICROPORE 2"X1.5YD 1530S-2

## (undated) DEVICE — PAD CHUX UNDERPAD 23X24" 7136

## (undated) DEVICE — SU PDS II 3-0 SH 27" Z316H

## (undated) DEVICE — DRAPE LAP W/ARMBOARD 29410

## (undated) DEVICE — BLADE KNIFE SURG 15 371115

## (undated) DEVICE — SUCTION TIP POOLE K770

## (undated) DEVICE — LINEN TOWEL PACK X6 WHITE 5487

## (undated) DEVICE — SU SILK 3-0 SH CR 8X18" C013D

## (undated) DEVICE — DECANTER VIAL 2006S

## (undated) DEVICE — ESU LIGASURE IMPACT OPEN SEALER/DVDR CVD LG JAW LF4418

## (undated) DEVICE — DECANTER BAG 2002S

## (undated) DEVICE — KIT HAND CONTROL ACIST 014644 AR-P54

## (undated) DEVICE — SU SILK 1 TIE 6X30" A307H

## (undated) DEVICE — SU PDS II 6-0 RB-2DA 30" Z149H

## (undated) DEVICE — ESU PENCIL SMOKE EVAC W/ROCKER SWITCH 0703-047-000

## (undated) DEVICE — SUCTION TIP YANKAUER STR K87

## (undated) DEVICE — SU ETHILON 2-0 FS 18" 664H

## (undated) DEVICE — PREP POVIDONE IODINE SCRUB 7.5% 4OZ APL82212

## (undated) DEVICE — COVER LIGHT HANDLE  HILL-ROM C LT-C02

## (undated) DEVICE — SOL WATER IRRIG 1000ML BOTTLE 2F7114

## (undated) DEVICE — SU PDS II 5-0 RB-1 27" Z303H

## (undated) DEVICE — SYR 01ML 27GA 0.5" NDL TBC 309623

## (undated) DEVICE — DRAPE BACK TABLE  44X90" 8377

## (undated) DEVICE — DRSG MEDIPORE 3 1/2X13 3/4" 3573

## (undated) DEVICE — CATH ANGIO INFINITI JL4 4FRX100CM 538420

## (undated) DEVICE — INSERT FOGARTY 33MM TRACTION HYDRAJAW HYDRA33

## (undated) DEVICE — SU VICRYL 0 CT-1 CR 8X27" UND JJ41G

## (undated) DEVICE — DRAPE FLUID WARMING 52 X 60" ORS-321

## (undated) DEVICE — DRSG PREVENA NEGATIVE PRESSURE WND 13CMX2  LF PRE1121US

## (undated) DEVICE — SU MONOCRYL 4-0 PS-2 18" UND Y496G

## (undated) DEVICE — DEFIB PRO-PADZ LVP LQD GEL ADULT 8900-2105-01

## (undated) DEVICE — SU PROLENE 6-0 RB-2DA 30" 8711H

## (undated) DEVICE — CLIP ENDO HEMO-LOC PURPLE LG 544240

## (undated) DEVICE — LIGHT HANDLE X1 31140133

## (undated) DEVICE — DRAIN JACKSON PRATT ROUND W/TROCAR 19FR JP-HUR195

## (undated) DEVICE — RX VISTASEAL FIBRIN SEALANT W/THROMBIN 10ML VST10

## (undated) DEVICE — PACK HEART LEFT CUSTOM

## (undated) DEVICE — SOL NACL 0.9% INJ 1000ML BAG 2B1324X

## (undated) DEVICE — BASIN SET SINGLE STERILE 13752-624

## (undated) DEVICE — ESU GROUND PAD THERMOGUARD PLUS ABC PEDS 7-382

## (undated) DEVICE — ESU HANDPIECE ABC BEND-A-BEAM 6" 134006

## (undated) RX ORDER — METOPROLOL TARTRATE 1 MG/ML
INJECTION, SOLUTION INTRAVENOUS
Status: DISPENSED
Start: 2023-06-06

## (undated) RX ORDER — HEPARIN SODIUM 1000 [USP'U]/ML
INJECTION, SOLUTION INTRAVENOUS; SUBCUTANEOUS
Status: DISPENSED
Start: 2023-06-06

## (undated) RX ORDER — LIDOCAINE HYDROCHLORIDE 20 MG/ML
SOLUTION OROPHARYNGEAL
Status: DISPENSED
Start: 2023-06-12

## (undated) RX ORDER — HEPARIN SODIUM 1000 [USP'U]/ML
INJECTION, SOLUTION INTRAVENOUS; SUBCUTANEOUS
Status: DISPENSED
Start: 2023-06-14

## (undated) RX ORDER — FENTANYL CITRATE 50 UG/ML
INJECTION, SOLUTION INTRAMUSCULAR; INTRAVENOUS
Status: DISPENSED
Start: 2023-01-26

## (undated) RX ORDER — FENTANYL CITRATE-0.9 % NACL/PF 10 MCG/ML
PLASTIC BAG, INJECTION (ML) INTRAVENOUS
Status: DISPENSED
Start: 2023-12-10

## (undated) RX ORDER — PAPAVERINE HYDROCHLORIDE 30 MG/ML
INJECTION INTRAMUSCULAR; INTRAVENOUS
Status: DISPENSED
Start: 2023-06-06

## (undated) RX ORDER — FENTANYL CITRATE 50 UG/ML
INJECTION, SOLUTION INTRAMUSCULAR; INTRAVENOUS
Status: DISPENSED
Start: 2023-06-06

## (undated) RX ORDER — HYDROMORPHONE HYDROCHLORIDE 1 MG/ML
INJECTION, SOLUTION INTRAMUSCULAR; INTRAVENOUS; SUBCUTANEOUS
Status: DISPENSED
Start: 2023-12-07

## (undated) RX ORDER — ONDANSETRON 2 MG/ML
INJECTION INTRAMUSCULAR; INTRAVENOUS
Status: DISPENSED
Start: 2023-12-10

## (undated) RX ORDER — PROPOFOL 10 MG/ML
INJECTION, EMULSION INTRAVENOUS
Status: DISPENSED
Start: 2023-06-06

## (undated) RX ORDER — VERAPAMIL HYDROCHLORIDE 2.5 MG/ML
INJECTION, SOLUTION INTRAVENOUS
Status: DISPENSED
Start: 2023-06-06

## (undated) RX ORDER — CEFAZOLIN SODIUM/WATER 2 G/20 ML
SYRINGE (ML) INTRAVENOUS
Status: DISPENSED
Start: 2023-12-07

## (undated) RX ORDER — DEXAMETHASONE SODIUM PHOSPHATE 4 MG/ML
INJECTION, SOLUTION INTRA-ARTICULAR; INTRALESIONAL; INTRAMUSCULAR; INTRAVENOUS; SOFT TISSUE
Status: DISPENSED
Start: 2023-12-10

## (undated) RX ORDER — HEPARIN SODIUM,PORCINE 10 UNIT/ML
VIAL (ML) INTRAVENOUS
Status: DISPENSED
Start: 2023-12-12

## (undated) RX ORDER — HEPARIN SODIUM 1000 [USP'U]/ML
INJECTION, SOLUTION INTRAVENOUS; SUBCUTANEOUS
Status: DISPENSED
Start: 2023-01-26

## (undated) RX ORDER — PROPOFOL 10 MG/ML
INJECTION, EMULSION INTRAVENOUS
Status: DISPENSED
Start: 2023-12-10

## (undated) RX ORDER — MANNITOL 20 G/100ML
INJECTION, SOLUTION INTRAVENOUS
Status: DISPENSED
Start: 2023-06-06

## (undated) RX ORDER — LIDOCAINE HYDROCHLORIDE 10 MG/ML
INJECTION, SOLUTION EPIDURAL; INFILTRATION; INTRACAUDAL; PERINEURAL
Status: DISPENSED
Start: 2023-04-27

## (undated) RX ORDER — LIDOCAINE HYDROCHLORIDE 10 MG/ML
INJECTION, SOLUTION EPIDURAL; INFILTRATION; INTRACAUDAL; PERINEURAL
Status: DISPENSED
Start: 2023-12-12

## (undated) RX ORDER — POTASSIUM CHLORIDE 750 MG/1
TABLET, EXTENDED RELEASE ORAL
Status: DISPENSED
Start: 2023-04-27

## (undated) RX ORDER — NICARDIPINE HCL-0.9% SOD CHLOR 1 MG/10 ML
SYRINGE (ML) INTRAVENOUS
Status: DISPENSED
Start: 2023-04-27

## (undated) RX ORDER — FENTANYL CITRATE-0.9 % NACL/PF 10 MCG/ML
PLASTIC BAG, INJECTION (ML) INTRAVENOUS
Status: DISPENSED
Start: 2023-06-06

## (undated) RX ORDER — FENTANYL CITRATE 50 UG/ML
INJECTION, SOLUTION INTRAMUSCULAR; INTRAVENOUS
Status: DISPENSED
Start: 2023-12-12

## (undated) RX ORDER — FENTANYL CITRATE 50 UG/ML
INJECTION, SOLUTION INTRAMUSCULAR; INTRAVENOUS
Status: DISPENSED
Start: 2023-12-07

## (undated) RX ORDER — HEPARIN SODIUM 1000 [USP'U]/ML
INJECTION, SOLUTION INTRAVENOUS; SUBCUTANEOUS
Status: DISPENSED
Start: 2023-04-27

## (undated) RX ORDER — CALCIUM CHLORIDE 100 MG/ML
INJECTION INTRAVENOUS; INTRAVENTRICULAR
Status: DISPENSED
Start: 2023-06-06

## (undated) RX ORDER — REGADENOSON 0.08 MG/ML
INJECTION, SOLUTION INTRAVENOUS
Status: DISPENSED
Start: 2023-01-12

## (undated) RX ORDER — CEFAZOLIN SODIUM 2 G/100ML
INJECTION, SOLUTION INTRAVENOUS
Status: DISPENSED
Start: 2023-01-26

## (undated) RX ORDER — HEPARIN SODIUM,PORCINE 10 UNIT/ML
VIAL (ML) INTRAVENOUS
Status: DISPENSED
Start: 2023-06-16

## (undated) RX ORDER — ESMOLOL HYDROCHLORIDE 10 MG/ML
INJECTION INTRAVENOUS
Status: DISPENSED
Start: 2023-06-06

## (undated) RX ORDER — SODIUM CHLORIDE 9 MG/ML
INJECTION, SOLUTION INTRAVENOUS
Status: DISPENSED
Start: 2023-04-27

## (undated) RX ORDER — FENTANYL CITRATE 50 UG/ML
INJECTION, SOLUTION INTRAMUSCULAR; INTRAVENOUS
Status: DISPENSED
Start: 2023-04-27

## (undated) RX ORDER — DEXAMETHASONE SODIUM PHOSPHATE 4 MG/ML
INJECTION, SOLUTION INTRA-ARTICULAR; INTRALESIONAL; INTRAMUSCULAR; INTRAVENOUS; SOFT TISSUE
Status: DISPENSED
Start: 2023-06-06

## (undated) RX ORDER — LIDOCAINE HYDROCHLORIDE 10 MG/ML
INJECTION, SOLUTION EPIDURAL; INFILTRATION; INTRACAUDAL; PERINEURAL
Status: DISPENSED
Start: 2023-06-14

## (undated) RX ORDER — LIDOCAINE HYDROCHLORIDE 10 MG/ML
INJECTION, SOLUTION EPIDURAL; INFILTRATION; INTRACAUDAL; PERINEURAL
Status: DISPENSED
Start: 2023-01-26

## (undated) RX ORDER — NITROGLYCERIN 5 MG/ML
VIAL (ML) INTRAVENOUS
Status: DISPENSED
Start: 2023-04-27

## (undated) RX ORDER — LIDOCAINE HYDROCHLORIDE 10 MG/ML
INJECTION, SOLUTION EPIDURAL; INFILTRATION; INTRACAUDAL; PERINEURAL
Status: DISPENSED
Start: 2023-06-16

## (undated) RX ORDER — FENTANYL CITRATE 50 UG/ML
INJECTION, SOLUTION INTRAMUSCULAR; INTRAVENOUS
Status: DISPENSED
Start: 2023-12-10